# Patient Record
Sex: FEMALE | Race: WHITE | Employment: OTHER | URBAN - METROPOLITAN AREA
[De-identification: names, ages, dates, MRNs, and addresses within clinical notes are randomized per-mention and may not be internally consistent; named-entity substitution may affect disease eponyms.]

---

## 2017-06-06 ENCOUNTER — HOSPITAL ENCOUNTER (INPATIENT)
Age: 42
LOS: 14 days | Discharge: HOME HEALTH | DRG: 908 | End: 2017-06-21
Attending: INTERNAL MEDICINE | Admitting: SURGERY
Payer: MEDICARE

## 2017-06-06 ENCOUNTER — APPOINTMENT (OUTPATIENT)
Dept: GENERAL RADIOLOGY | Age: 42
DRG: 908 | End: 2017-06-06
Attending: INTERNAL MEDICINE
Payer: MEDICARE

## 2017-06-06 ENCOUNTER — ANESTHESIA EVENT (OUTPATIENT)
Dept: ENDOSCOPY | Age: 42
DRG: 908 | End: 2017-06-06
Payer: MEDICARE

## 2017-06-06 ENCOUNTER — ANESTHESIA (OUTPATIENT)
Dept: ENDOSCOPY | Age: 42
DRG: 908 | End: 2017-06-06
Payer: MEDICARE

## 2017-06-06 ENCOUNTER — APPOINTMENT (OUTPATIENT)
Dept: GENERAL RADIOLOGY | Age: 42
DRG: 908 | End: 2017-06-06
Attending: SURGERY
Payer: MEDICARE

## 2017-06-06 PROBLEM — K63.1 PERFORATED BOWEL (HCC): Status: ACTIVE | Noted: 2017-06-06

## 2017-06-06 LAB — HCG UR QL: NEGATIVE

## 2017-06-06 PROCEDURE — 76040000009: Performed by: INTERNAL MEDICINE

## 2017-06-06 PROCEDURE — 77030035048 HC TRCR ENDOSC OPTCL COVD -B: Performed by: SURGERY

## 2017-06-06 PROCEDURE — 77030010541: Performed by: SURGERY

## 2017-06-06 PROCEDURE — 74011000258 HC RX REV CODE- 258

## 2017-06-06 PROCEDURE — 74011250636 HC RX REV CODE- 250/636

## 2017-06-06 PROCEDURE — 77030020263 HC SOL INJ SOD CL0.9% LFCR 1000ML: Performed by: SURGERY

## 2017-06-06 PROCEDURE — 77030020747 HC TU INSUF ENDOSC TELE -A: Performed by: SURGERY

## 2017-06-06 PROCEDURE — 77030013079 HC BLNKT BAIR HGGR 3M -A: Performed by: ANESTHESIOLOGY

## 2017-06-06 PROCEDURE — 77030011640 HC PAD GRND REM COVD -A: Performed by: SURGERY

## 2017-06-06 PROCEDURE — 77030008756 HC TU IRR SUC STRY -B: Performed by: SURGERY

## 2017-06-06 PROCEDURE — 77030026438 HC STYL ET INTUB CARD -A: Performed by: ANESTHESIOLOGY

## 2017-06-06 PROCEDURE — 77030034849: Performed by: SURGERY

## 2017-06-06 PROCEDURE — 77030002933 HC SUT MCRYL J&J -A: Performed by: SURGERY

## 2017-06-06 PROCEDURE — 77030008684 HC TU ET CUF COVD -B: Performed by: ANESTHESIOLOGY

## 2017-06-06 PROCEDURE — 77030010936 HC CLP LIG BSC -C: Performed by: INTERNAL MEDICINE

## 2017-06-06 PROCEDURE — 77030011264 HC ELECTRD BLD EXT COVD -A: Performed by: SURGERY

## 2017-06-06 PROCEDURE — 77030002966 HC SUT PDS J&J -A: Performed by: SURGERY

## 2017-06-06 PROCEDURE — 74011000250 HC RX REV CODE- 250: Performed by: SURGERY

## 2017-06-06 PROCEDURE — 0DJ08ZZ INSPECTION OF UPPER INTESTINAL TRACT, VIA NATURAL OR ARTIFICIAL OPENING ENDOSCOPIC: ICD-10-PCS | Performed by: INTERNAL MEDICINE

## 2017-06-06 PROCEDURE — 77030035051: Performed by: SURGERY

## 2017-06-06 PROCEDURE — 74000 XR ABD PORT  1 V: CPT

## 2017-06-06 PROCEDURE — C1751 CATH, INF, PER/CENT/MIDLINE: HCPCS

## 2017-06-06 PROCEDURE — 80047 BASIC METABLC PNL IONIZED CA: CPT

## 2017-06-06 PROCEDURE — 77030012407 HC DRN WND BARD -B: Performed by: SURGERY

## 2017-06-06 PROCEDURE — 76060000045 HC ANESTHESIA 7 TO 7.5 HR: Performed by: SURGERY

## 2017-06-06 PROCEDURE — 81025 URINE PREGNANCY TEST: CPT

## 2017-06-06 PROCEDURE — 77030022711 HC TU ENTRPRO BLN DISP OLSI -C: Performed by: INTERNAL MEDICINE

## 2017-06-06 PROCEDURE — 77030018836 HC SOL IRR NACL ICUM -A: Performed by: SURGERY

## 2017-06-06 PROCEDURE — 77030013567 HC DRN WND RESERV BARD -A: Performed by: SURGERY

## 2017-06-06 PROCEDURE — 77030031139 HC SUT VCRL2 J&J -A: Performed by: SURGERY

## 2017-06-06 PROCEDURE — 74011000250 HC RX REV CODE- 250

## 2017-06-06 PROCEDURE — 77030002996 HC SUT SLK J&J -A: Performed by: SURGERY

## 2017-06-06 PROCEDURE — 77030002916 HC SUT ETHLN J&J -A: Performed by: SURGERY

## 2017-06-06 PROCEDURE — 76210000005 HC OR PH I REC 5 TO 5.5 HR: Performed by: SURGERY

## 2017-06-06 PROCEDURE — 77030032490 HC SLV COMPR SCD KNE COVD -B: Performed by: SURGERY

## 2017-06-06 PROCEDURE — 76010000142 HC OR TIME 7 TO 7.5 HR: Performed by: SURGERY

## 2017-06-06 PROCEDURE — 74011250636 HC RX REV CODE- 250/636: Performed by: INTERNAL MEDICINE

## 2017-06-06 PROCEDURE — 76060000034 HC ANESTHESIA 1.5 TO 2 HR: Performed by: INTERNAL MEDICINE

## 2017-06-06 PROCEDURE — 77030010507 HC ADH SKN DERMBND J&J -B: Performed by: SURGERY

## 2017-06-06 RX ORDER — HYDROMORPHONE HYDROCHLORIDE 2 MG/ML
INJECTION, SOLUTION INTRAMUSCULAR; INTRAVENOUS; SUBCUTANEOUS AS NEEDED
Status: DISCONTINUED | OUTPATIENT
Start: 2017-06-06 | End: 2017-06-07 | Stop reason: HOSPADM

## 2017-06-06 RX ORDER — BUPIVACAINE HYDROCHLORIDE 2.5 MG/ML
50 INJECTION, SOLUTION EPIDURAL; INFILTRATION; INTRACAUDAL ONCE
Status: COMPLETED | OUTPATIENT
Start: 2017-06-07 | End: 2017-06-06

## 2017-06-06 RX ORDER — ONDANSETRON 2 MG/ML
INJECTION INTRAMUSCULAR; INTRAVENOUS AS NEEDED
Status: DISCONTINUED | OUTPATIENT
Start: 2017-06-06 | End: 2017-06-07 | Stop reason: HOSPADM

## 2017-06-06 RX ORDER — LIDOCAINE HYDROCHLORIDE 20 MG/ML
INJECTION, SOLUTION EPIDURAL; INFILTRATION; INTRACAUDAL; PERINEURAL AS NEEDED
Status: DISCONTINUED | OUTPATIENT
Start: 2017-06-06 | End: 2017-06-07 | Stop reason: HOSPADM

## 2017-06-06 RX ORDER — MIDAZOLAM HYDROCHLORIDE 1 MG/ML
INJECTION, SOLUTION INTRAMUSCULAR; INTRAVENOUS AS NEEDED
Status: DISCONTINUED | OUTPATIENT
Start: 2017-06-06 | End: 2017-06-07 | Stop reason: HOSPADM

## 2017-06-06 RX ORDER — ONDANSETRON 8 MG/1
8 TABLET, ORALLY DISINTEGRATING ORAL
COMMUNITY
End: 2017-08-24

## 2017-06-06 RX ORDER — SUCCINYLCHOLINE CHLORIDE 20 MG/ML
INJECTION INTRAMUSCULAR; INTRAVENOUS AS NEEDED
Status: DISCONTINUED | OUTPATIENT
Start: 2017-06-06 | End: 2017-06-07 | Stop reason: HOSPADM

## 2017-06-06 RX ORDER — DIPHENHYDRAMINE HYDROCHLORIDE 50 MG/ML
50 INJECTION, SOLUTION INTRAMUSCULAR; INTRAVENOUS ONCE
Status: ACTIVE | OUTPATIENT
Start: 2017-06-06 | End: 2017-06-07

## 2017-06-06 RX ORDER — PREDNISONE 20 MG/1
30 TABLET ORAL DAILY
COMMUNITY
End: 2017-08-24

## 2017-06-06 RX ORDER — ROCURONIUM BROMIDE 10 MG/ML
INJECTION, SOLUTION INTRAVENOUS AS NEEDED
Status: DISCONTINUED | OUTPATIENT
Start: 2017-06-06 | End: 2017-06-07 | Stop reason: HOSPADM

## 2017-06-06 RX ORDER — DEXTROMETHORPHAN/PSEUDOEPHED 2.5-7.5/.8
1.2 DROPS ORAL
Status: DISCONTINUED | OUTPATIENT
Start: 2017-06-06 | End: 2017-06-07

## 2017-06-06 RX ORDER — SODIUM CHLORIDE 0.9 % (FLUSH) 0.9 %
5-10 SYRINGE (ML) INJECTION EVERY 8 HOURS
Status: ACTIVE | OUTPATIENT
Start: 2017-06-06 | End: 2017-06-06

## 2017-06-06 RX ORDER — DEXAMETHASONE SODIUM PHOSPHATE 4 MG/ML
INJECTION, SOLUTION INTRA-ARTICULAR; INTRALESIONAL; INTRAMUSCULAR; INTRAVENOUS; SOFT TISSUE AS NEEDED
Status: DISCONTINUED | OUTPATIENT
Start: 2017-06-06 | End: 2017-06-07 | Stop reason: HOSPADM

## 2017-06-06 RX ORDER — LABETALOL HYDROCHLORIDE 5 MG/ML
INJECTION, SOLUTION INTRAVENOUS AS NEEDED
Status: DISCONTINUED | OUTPATIENT
Start: 2017-06-06 | End: 2017-06-07 | Stop reason: HOSPADM

## 2017-06-06 RX ORDER — FENTANYL CITRATE 50 UG/ML
100 INJECTION, SOLUTION INTRAMUSCULAR; INTRAVENOUS
Status: ACTIVE | OUTPATIENT
Start: 2017-06-06 | End: 2017-06-06

## 2017-06-06 RX ORDER — MIDAZOLAM HYDROCHLORIDE 1 MG/ML
.25-1 INJECTION, SOLUTION INTRAMUSCULAR; INTRAVENOUS
Status: DISPENSED | OUTPATIENT
Start: 2017-06-06 | End: 2017-06-06

## 2017-06-06 RX ORDER — PROPOFOL 10 MG/ML
INJECTION, EMULSION INTRAVENOUS AS NEEDED
Status: DISCONTINUED | OUTPATIENT
Start: 2017-06-06 | End: 2017-06-07 | Stop reason: HOSPADM

## 2017-06-06 RX ORDER — SODIUM CHLORIDE 0.9 % (FLUSH) 0.9 %
5-10 SYRINGE (ML) INJECTION AS NEEDED
Status: ACTIVE | OUTPATIENT
Start: 2017-06-06 | End: 2017-06-06

## 2017-06-06 RX ORDER — SODIUM CHLORIDE 9 MG/ML
100 INJECTION, SOLUTION INTRAVENOUS CONTINUOUS
Status: DISPENSED | OUTPATIENT
Start: 2017-06-06 | End: 2017-06-06

## 2017-06-06 RX ORDER — FLUMAZENIL 0.1 MG/ML
0.2 INJECTION INTRAVENOUS
Status: ACTIVE | OUTPATIENT
Start: 2017-06-06 | End: 2017-06-06

## 2017-06-06 RX ORDER — EPINEPHRINE 0.1 MG/ML
1 INJECTION INTRACARDIAC; INTRAVENOUS
Status: ACTIVE | OUTPATIENT
Start: 2017-06-06 | End: 2017-06-06

## 2017-06-06 RX ORDER — ATROPINE SULFATE 0.1 MG/ML
0.5 INJECTION INTRAVENOUS
Status: ACTIVE | OUTPATIENT
Start: 2017-06-06 | End: 2017-06-06

## 2017-06-06 RX ORDER — NALOXONE HYDROCHLORIDE 0.4 MG/ML
0.4 INJECTION, SOLUTION INTRAMUSCULAR; INTRAVENOUS; SUBCUTANEOUS
Status: ACTIVE | OUTPATIENT
Start: 2017-06-06 | End: 2017-06-06

## 2017-06-06 RX ORDER — SODIUM CHLORIDE, SODIUM LACTATE, POTASSIUM CHLORIDE, CALCIUM CHLORIDE 600; 310; 30; 20 MG/100ML; MG/100ML; MG/100ML; MG/100ML
INJECTION, SOLUTION INTRAVENOUS
Status: DISCONTINUED | OUTPATIENT
Start: 2017-06-06 | End: 2017-06-07 | Stop reason: HOSPADM

## 2017-06-06 RX ADMIN — DEXAMETHASONE SODIUM PHOSPHATE 8 MG: 4 INJECTION, SOLUTION INTRA-ARTICULAR; INTRALESIONAL; INTRAMUSCULAR; INTRAVENOUS; SOFT TISSUE at 16:39

## 2017-06-06 RX ADMIN — HYDROMORPHONE HYDROCHLORIDE 0.5 MG: 2 INJECTION, SOLUTION INTRAMUSCULAR; INTRAVENOUS; SUBCUTANEOUS at 20:08

## 2017-06-06 RX ADMIN — ROCURONIUM BROMIDE 30 MG: 10 INJECTION, SOLUTION INTRAVENOUS at 20:38

## 2017-06-06 RX ADMIN — PROPOFOL 100 MG: 10 INJECTION, EMULSION INTRAVENOUS at 16:45

## 2017-06-06 RX ADMIN — FENTANYL CITRATE 50 MCG: 50 INJECTION, SOLUTION INTRAMUSCULAR; INTRAVENOUS at 19:45

## 2017-06-06 RX ADMIN — LABETALOL HYDROCHLORIDE 5 MG: 5 INJECTION, SOLUTION INTRAVENOUS at 21:46

## 2017-06-06 RX ADMIN — LABETALOL HYDROCHLORIDE 10 MG: 5 INJECTION, SOLUTION INTRAVENOUS at 21:57

## 2017-06-06 RX ADMIN — SODIUM CHLORIDE, SODIUM LACTATE, POTASSIUM CHLORIDE, CALCIUM CHLORIDE: 600; 310; 30; 20 INJECTION, SOLUTION INTRAVENOUS at 21:23

## 2017-06-06 RX ADMIN — PROPOFOL 100 MG: 10 INJECTION, EMULSION INTRAVENOUS at 17:00

## 2017-06-06 RX ADMIN — MIDAZOLAM HYDROCHLORIDE 2 MG: 1 INJECTION, SOLUTION INTRAMUSCULAR; INTRAVENOUS at 18:22

## 2017-06-06 RX ADMIN — SODIUM CHLORIDE, SODIUM LACTATE, POTASSIUM CHLORIDE, CALCIUM CHLORIDE: 600; 310; 30; 20 INJECTION, SOLUTION INTRAVENOUS at 16:00

## 2017-06-06 RX ADMIN — HYDROMORPHONE HYDROCHLORIDE 0.5 MG: 2 INJECTION, SOLUTION INTRAMUSCULAR; INTRAVENOUS; SUBCUTANEOUS at 19:31

## 2017-06-06 RX ADMIN — HYDROMORPHONE HYDROCHLORIDE 0.5 MG: 2 INJECTION, SOLUTION INTRAMUSCULAR; INTRAVENOUS; SUBCUTANEOUS at 20:50

## 2017-06-06 RX ADMIN — SODIUM CHLORIDE, SODIUM LACTATE, POTASSIUM CHLORIDE, CALCIUM CHLORIDE: 600; 310; 30; 20 INJECTION, SOLUTION INTRAVENOUS at 18:15

## 2017-06-06 RX ADMIN — SUCCINYLCHOLINE CHLORIDE 180 MG: 20 INJECTION INTRAMUSCULAR; INTRAVENOUS at 16:31

## 2017-06-06 RX ADMIN — SODIUM CHLORIDE 100 ML/HR: 900 INJECTION, SOLUTION INTRAVENOUS at 16:00

## 2017-06-06 RX ADMIN — ROCURONIUM BROMIDE 20 MG: 10 INJECTION, SOLUTION INTRAVENOUS at 18:58

## 2017-06-06 RX ADMIN — ONDANSETRON 4 MG: 2 INJECTION INTRAMUSCULAR; INTRAVENOUS at 16:31

## 2017-06-06 RX ADMIN — HYDROMORPHONE HYDROCHLORIDE 0.5 MG: 2 INJECTION, SOLUTION INTRAMUSCULAR; INTRAVENOUS; SUBCUTANEOUS at 20:43

## 2017-06-06 RX ADMIN — PROPOFOL 150 MG: 10 INJECTION, EMULSION INTRAVENOUS at 18:22

## 2017-06-06 RX ADMIN — LIDOCAINE HYDROCHLORIDE 80 MG: 20 INJECTION, SOLUTION EPIDURAL; INFILTRATION; INTRACAUDAL; PERINEURAL at 16:31

## 2017-06-06 RX ADMIN — ROCURONIUM BROMIDE 10 MG: 10 INJECTION, SOLUTION INTRAVENOUS at 16:31

## 2017-06-06 RX ADMIN — ROCURONIUM BROMIDE 40 MG: 10 INJECTION, SOLUTION INTRAVENOUS at 18:22

## 2017-06-06 RX ADMIN — FENTANYL CITRATE 50 MCG: 50 INJECTION, SOLUTION INTRAMUSCULAR; INTRAVENOUS at 21:35

## 2017-06-06 RX ADMIN — LABETALOL HYDROCHLORIDE 5 MG: 5 INJECTION, SOLUTION INTRAVENOUS at 21:20

## 2017-06-06 RX ADMIN — PROPOFOL 100 MG: 10 INJECTION, EMULSION INTRAVENOUS at 16:42

## 2017-06-06 RX ADMIN — ROCURONIUM BROMIDE 10 MG: 10 INJECTION, SOLUTION INTRAVENOUS at 19:31

## 2017-06-06 RX ADMIN — SODIUM CHLORIDE, SODIUM LACTATE, POTASSIUM CHLORIDE, CALCIUM CHLORIDE: 600; 310; 30; 20 INJECTION, SOLUTION INTRAVENOUS at 20:38

## 2017-06-06 RX ADMIN — LABETALOL HYDROCHLORIDE 5 MG: 5 INJECTION, SOLUTION INTRAVENOUS at 21:15

## 2017-06-06 RX ADMIN — ROCURONIUM BROMIDE 20 MG: 10 INJECTION, SOLUTION INTRAVENOUS at 20:08

## 2017-06-06 RX ADMIN — FENTANYL CITRATE 50 MCG: 50 INJECTION, SOLUTION INTRAMUSCULAR; INTRAVENOUS at 19:26

## 2017-06-06 RX ADMIN — PROPOFOL 200 MG: 10 INJECTION, EMULSION INTRAVENOUS at 16:31

## 2017-06-06 RX ADMIN — ROCURONIUM BROMIDE 20 MG: 10 INJECTION, SOLUTION INTRAVENOUS at 21:20

## 2017-06-06 RX ADMIN — ROCURONIUM BROMIDE 20 MG: 10 INJECTION, SOLUTION INTRAVENOUS at 19:03

## 2017-06-06 RX ADMIN — FENTANYL CITRATE 50 MCG: 50 INJECTION, SOLUTION INTRAMUSCULAR; INTRAVENOUS at 18:38

## 2017-06-06 RX ADMIN — PROPOFOL 100 MG: 10 INJECTION, EMULSION INTRAVENOUS at 17:34

## 2017-06-06 RX ADMIN — FENTANYL CITRATE 50 MCG: 50 INJECTION, SOLUTION INTRAMUSCULAR; INTRAVENOUS at 18:40

## 2017-06-06 NOTE — H&P
[address], [address] [place] [address]      History and Physical       NAME:  Pedro Pablo Macedo   :   1975   MRN:   [id number]             History of Present Illness:  Patient is a 46 y.o. who is seen for antegrade single balloon enteroscopy for retained video capsule endoscopy. PMH:  Past Medical History:   Diagnosis Date    Crohn's disease ([facility] 8/15/2011    DVT (deep venous thrombosis) ([facility] 8/15/2011    Incarcerated ventral hernia 8/15/2011    Right flank pain 2011    Seizures ([facility]     Stroke [facility])        PSH:  Past Surgical History:   Procedure Laterality Date    HX OTHER SURGICAL      multiple procedures related to her Crohn's disease    ID LAP, INCISIONAL HERNIA REPAIR,INCARCERATED  9-10-08    dr. Ken Vences       Allergies: Allergies   Allergen Reactions    Demerol [Meperidine] Unknown (comments)    Soma [Carisoprodol] Rash and Nausea Only    Sulfa (Sulfonamide Antibiotics) Unknown (comments)    Toradol [Ketorolac Tromethamine] Unknown (comments)       Home Medications:  Prior to Admission Medications   Prescriptions Last Dose Informant Patient Reported? Taking? ALPRAZOLAM (XANAX PO) 2017 at Unknown time  Yes Yes   Sig: Take  by mouth. INFLIXIMAB (REMICADE IV) 2017  Yes No   Sig: by IntraVENous route. Every 12 weeks    LANSOPRAZOLE (PREVACID PO)   Yes No   Sig: Take  by mouth. Oxycodone (ROXYCODONE) 20 mg tab Not Taking at Unknown time  Yes No   Sig: Take  by mouth every four (4) hours as needed. ZOLPIDEM TARTRATE (AMBIEN PO) Not Taking at Unknown time  Yes No   Sig: Take 20 mg by mouth. albuterol (PROVENTIL HFA, VENTOLIN HFA, PROAIR HFA) 90 mcg/actuation inhaler 2017 at Unknown time  Yes Yes   Sig: Take 2 Puffs by inhalation every four (4) hours as needed for Wheezing. buPROPion XL (WELLBUTRIN XL) 300 mg XL tablet Not Taking at Unknown time  Yes No   Sig: Take 300 mg by mouth every morning.    cholecalciferol, vitamin d3, (VITAMIN D) 1,000 unit tablet Not Taking at Unknown time  Yes No   Sig: Take  by mouth daily. cyanocobalamin (VITAMIN B-12) 1,000 mcg/mL injection 2017 at Unknown time  Yes Yes   Si,000 mcg by IntraMUSCular route every fourteen (14) days. fentanyl (DURAGESIC) 100 mcg/hr PATCH Not Taking at Unknown time  Yes No   Si Patch by TransDERmal route every seventy-two (72) hours. magnesium 250 mg tab Not Taking at Unknown time  Yes No   Sig: Take 500 mg by mouth daily. methocarbamol (ROBAXIN-750) 750 mg tablet Not Taking at Unknown time  Yes No   Sig: Take  by mouth four (4) times daily. methylnaltrexone (RELISTOR) 150 mg tab tablet 2017 at Unknown time  Yes Yes   Sig: Take 150 mg by mouth Daily (before breakfast). morphine CR (MS CONTIN) 60 mg CR tablet 2017 at Unknown time  Yes Yes   Sig: Take 60 mg by mouth three (3) times daily. morphine IR (MS IR) 30 mg tablet 2017 at Unknown time  Yes Yes   Sig: Take 30 mg by mouth every four (4) hours as needed for Pain. ondansetron (ZOFRAN ODT) 8 mg disintegrating tablet 2017 at Unknown time  Yes Yes   Sig: Take 8 mg by mouth every eight (8) hours as needed for Nausea. Indications: ACUTE GASTROENTERITIS-RELATED VOMITING IN PEDIATRICS   predniSONE (DELTASONE) 20 mg tablet 2017 at Unknown time  Yes Yes   Sig: Take 30 mg by mouth daily. promethazine (PHENERGAN) 25 mg suppository 2017 at Unknown time  Yes Yes   Sig: Insert 25 mg into rectum every six (6) hours as needed.       Facility-Administered Medications: None       Hospital Medications:  Current Facility-Administered Medications   Medication Dose Route Frequency    0.9% sodium chloride infusion  100 mL/hr IntraVENous CONTINUOUS    sodium chloride (NS) flush 5-10 mL  5-10 mL IntraVENous Q8H    sodium chloride (NS) flush 5-10 mL  5-10 mL IntraVENous PRN    midazolam (VERSED) injection 0.25-10 mg  0.25-10 mg IntraVENous Multiple    fentaNYL citrate (PF) injection 100 mcg 100 mcg IntraVENous Multiple    naloxone (NARCAN) injection 0.4 mg  0.4 mg IntraVENous Multiple    flumazenil (ROMAZICON) 0.1 mg/mL injection 0.2 mg  0.2 mg IntraVENous Multiple    simethicone (MYLICON) 86BV/9.0AU oral drops 80 mg  1.2 mL Oral Multiple    diphenhydrAMINE (BENADRYL) injection 50 mg  50 mg IntraVENous ONCE    atropine injection 0.5 mg  0.5 mg IntraVENous ONCE PRN    EPINEPHrine (ADRENALIN) 0.1 mg/mL syringe 1 mg  1 mg Endoscopically ONCE PRN       Social History:  Social History   Substance Use Topics    Smoking status: Former Smoker    Smokeless tobacco: Not on file    Alcohol use No       Family History:  Family History   Problem Relation Age of Onset    Hypertension Father     Stroke Father     Other Father      arthritis             Review of Systems:      Constitutional: negative fever, negative chills, negative weight loss  Eyes:   negative visual changes  ENT:   negative sore throat, tongue or lip swelling  Respiratory:  negative cough, negative dyspnea  Cards:  negative for chest pain, palpitations, lower extremity edema  GI:   See HPI  :  negative for frequency, dysuria  Integument:  negative for rash and pruritus  Heme:  negative for easy bruising and gum/nose bleeding  Musculoskel: negative for myalgias,  back pain and muscle weakness  Neuro: negative for headaches, dizziness, vertigo  Psych:  negative for feelings of anxiety, depression       Objective:   Patient Vitals for the past 8 hrs:   BP Temp Pulse Resp SpO2 Height Weight   06/06/17 1531 - - - 24 - - -   06/06/17 1522 118/68 99.2 °F (37.3 °C) (!) 117 - 96 % 5' 4\" (1.626 m) 147 kg (324 lb)             EXAM:     NEURO-a&o   HEENT-wnl   LUNGS-clear    COR-regular rate and rhythym     ABD-soft , no tenderness, no rebound, good bs     EXT-no edema     Data Review     No results for input(s): WBC, HGB, HCT, PLT, HGBEXT, HCTEXT, PLTEXT in the last 72 hours.   No results for input(s): NA, K, CL, CO2, BUN, CREA, GLU, PHOS, CA in the last 72 hours. No results for input(s): SGOT, GPT, AP, TBIL, TP, ALB, GLOB, GGT, AML, LPSE in the last 72 hours. No lab exists for component: AMYP, HLPSE  No results for input(s): INR, PTP, APTT in the last 72 hours. No lab exists for component: INREXT       Assessment:   · Retained video capsule endoscopy     Patient Active Problem List   Diagnosis Code    Crohn's disease ([facility] K50.90    DVT (deep venous thrombosis) ([facility] I82.409    Incarcerated ventral hernia K46.0    Right flank pain R10.9     Plan:   · Endoscopic procedure with GA     Signed By: Johnson Damian.  Maco Recio MD     6/6/2017  4:08 PM

## 2017-06-06 NOTE — ANESTHESIA PREPROCEDURE EVALUATION
Anesthetic History   No history of anesthetic complications            Review of Systems / Medical History  Patient summary reviewed, nursing notes reviewed and pertinent labs reviewed    Pulmonary  Within defined limits                 Neuro/Psych   Within defined limits           Cardiovascular  Within defined limits                     GI/Hepatic/Renal  Within defined limits              Endo/Other  Within defined limits           Other Findings              Physical Exam    Airway  Mallampati: II  TM Distance: > 6 cm  Neck ROM: normal range of motion   Mouth opening: Normal     Cardiovascular  Regular rate and rhythm,  S1 and S2 normal,  no murmur, click, rub, or gallop             Dental  No notable dental hx       Pulmonary  Breath sounds clear to auscultation               Abdominal  GI exam deferred       Other Findings            Anesthetic Plan    ASA: 3  Anesthesia type: general          Induction: Intravenous  Anesthetic plan and risks discussed with: Patient

## 2017-06-06 NOTE — IP AVS SNAPSHOT
Summary of Care Report The Summary of Care report has been created to help improve care coordination. Users with access to [employer] or [address] (Web-based application) may access additional patient information including the Discharge Summary. If you are not currently a [address] user and need more information, please call the number listed below in the [place] [address] section and ask to be connected with Medical Records. Facility Information Name Address Phone [address] [id number] [phone] Patient Information Patient Name Sex ALEJO Sherwood ([id number]) Female 1975 Discharge Information Admitting Provider Service Area Unit [facility] / [phone] Discharge Provider Discharge Date/Time Discharge Disposition Destination (none) 2017 10:00 (Pending) AHR (none) Patient Language Language ENGLISH [13] Hospital Problems as of 2017  Reviewed: 2017  9:50 PM by MACHO Andrade Noted - Resolved Last Modified POA Active Problems Perforated bowel ([facility] [address]  2017 - Present 2017 by Luana Rees MD Unknown Entered by Luana Rees MD  
  
Non-Hospital Problems as of 2017  Reviewed: 2017  9:50 PM by MACHO Andrade Noted - Resolved Last Modified Active Problems Crohn's disease ([address]  8/15/2011 - Present 8/15/2011 by Leatha Jeffrey LPN Entered by Leatha Jeffrey LPN  
  DVT (deep venous thrombosis) ([facility] [address]  8/15/2011 - Present 8/15/2011 by Leatha Jeffrey LPN Entered by Leatha Jeffrey LPN Overview Signed 8/15/2011  2:08 PM by Leatha Jeffrey LPN  
   67   Incarcerated ventral hernia  8/15/2011 - Present 8/15/2011 by Leatha Jeffrey LPN  
 Entered by Sandrita Lutz LPN Overview Signed 8/15/2011  2:30 PM by Sandrita Lutz LPN History of 
  
  
  Right flank pain  8/22/2011 - Present 8/22/2011 by Sonido Lo MD  
  Entered by Sonido Lo MD  
  
You are allergic to the following Allergen Reactions Demerol (Meperidine) Unknown (comments) Soma (Carisoprodol) Rash Nausea Only Sulfa (Sulfonamide Antibiotics) Unknown (comments) Toradol (Ketorolac Tromethamine) Unknown (comments) Current Discharge Medication List  
  
START taking these medications Dose & Instructions Dispensing Information Comments  
 clindamycin 300 mg capsule Commonly known as:  CLEOCIN Dose:  300 mg Take 1 Cap by mouth three (3) times daily for 7 days. Quantity:  21 Cap Refills:  0 HYDROmorphone 2 mg tablet Commonly known as:  DILAUDID Dose:  2 mg Take 1 Tab by mouth every four (4) hours as needed. Max Daily Amount: 12 mg. Quantity:  40 Tab Refills:  0 CONTINUE these medications which have NOT CHANGED Dose & Instructions Dispensing Information Comments * albuterol 90 mcg/actuation inhaler Commonly known as:  PROVENTIL HFA, VENTOLIN HFA, PROAIR HFA Dose:  2 Puff Take 2 Puffs by inhalation every four (4) hours as needed for Wheezing. Refills:  0  
   
 * albuterol sulfate SR 4 mg ER tablet Commonly known as:  PROVENTIL SR Dose:  4 mg Take 4 mg by mouth daily as needed for Other (Wheezing). Tries to alternate with albuterol MDI when she doesn't need rapid resolution of symptoms Refills:  0  
   
 BREO ELLIPTA 200-25 mcg/dose inhaler Generic drug:  fluticasone-vilanterol Dose:  1 Puff Take 1 Puff by inhalation daily. Refills:  0  
   
 CRYSELLE (28) 0.3-30 mg-mcg Tab Generic drug:  norgestrel-ethinyl estradiol Dose:  1 Tab Take 1 Tab by mouth daily. Refills:  0  
   
 cyanocobalamin (vitamin B-12) 5,000 mcg Tbdi  
 Dose:  5000 mcg Take 5,000 mcg by mouth Every Friday. Refills:  0  
   
 esomeprazole 20 mg capsule Commonly known as:  Larey Meo Dose:  20 mg Take 20 mg by mouth daily. Refills:  0  
   
 * MS CONTIN 60 mg CR tablet Generic drug:  morphine CR Dose:  60 mg Take 60 mg by mouth three (3) times daily. Refills:  0  
   
 * morphine IR 30 mg tablet Commonly known as:  MS IR Dose:  30 mg Take 30 mg by mouth every four (4) hours as needed for Pain. Refills:  0  
   
 predniSONE 20 mg tablet Commonly known as:  Edgar Potash Dose:  30 mg Take 30 mg by mouth daily. Takes 1.5 of the 20 mg tab Refills:  0  
   
 promethazine 25 mg tablet Commonly known as:  PHENERGAN Dose:  25 mg Take 25 mg by mouth every six (6) hours as needed for Nausea. Tries to alternate with ondansetron Refills:  0  
   
 RELISTOR 150 mg Tab tablet Generic drug:  methylnaltrexone Dose:  150 mg Take 150 mg by mouth Daily (before breakfast). Refills:  0 REMICADE IV  
 by IntraVENous route every six (6) weeks. Refills:  0  
   
 XANAX PO Dose:  1 mg Take 1 mg by mouth three (3) times daily as needed. Refills:  0 ZOFRAN ODT 8 mg disintegrating tablet Generic drug:  ondansetron Dose:  8 mg Take 8 mg by mouth every eight (8) hours as needed for Nausea. Tries to alternate with promethazine Refills:  0  
   
 * Notice: This list has 4 medication(s) that are the same as other medications prescribed for you. Read the directions carefully, and ask your doctor or other care provider to review them with you. Surgery Information ID Date/Time Status Primary Surgeon All Procedures Location [id number] 6/6/2017 3 [facility] James Tran MD SMALL BOWEL ENTEROSCOPY (FLUORO) RESOLUTION CLIP [facility] ENDOSCOPY    
 [id number] 6/6/2017 1900 Posted David Barrett MD Diagnostic Laparoscopy, converted to open Exploratory Laparotomy, Lysis of adhesions for 7 hours/ Intraoperative Upper Endoscopy by Dr. Carolyne Gillis. [facility] MAIN OR Follow-up Information Follow up With Details Comments Contact Info Caro [address] 227 Call in 1 day home health wound care. Please call if you have not heard from [facility] by 11AM the day after discharge. [phone] [phone] 
[phone] Heather Bull MD   [facility] [address] [facility] [address] 
[phone] Discharge Instructions 1. Do not drive while on pain medication. You should take a stool softener while on pain medication to prevent constipation. 2.  Do not lift anything greater than 15 pounds for 3 weeks. 3.  You may resume your home medications. 4.  You may shower but do not swim or soak in tub until your abdominal wound has healed. 5.  You will need to take Clindamycin antibiotic for 7 days. Make sure you eat a cup of yogurt or take a probiotic while on antibiotic to prevent diarrhea. 6.  You will need to call [phone] to schedule a follow-up with Dr. Wiggins Session within 2 weeks. Infection After Surgery: Care Instructions Your Care Instructions After surgery, an infection is always possible. It doesn't mean that the surgery didn't go well. Because an infection can be serious, your doctor has taken steps to manage it. Your doctor checked the infection and cleaned it if necessary. He or she may have made an opening in the area so that the pus can drain out. You may have gauze in the cut so that the area will stay open and keep draining. You may need antibiotics. You will need to follow up with your doctor to make sure the infection has gone away. Follow-up care is a key part of your treatment and safety. Be sure to make and go to all appointments, and call your doctor if you are having problems. It's also a good idea to know your test results and keep a list of the medicines you take. How can you care for yourself at home? · Make sure your surgeon knows that you saw a doctor about the infection. · If your doctor prescribed antibiotics, take them as directed. Do not stop taking them just because you feel better. You need to take the full course of antibiotics. · Ask your doctor if you can take an over-the-counter pain medicine, such as acetaminophen (Tylenol), ibuprofen (Advil, Motrin), or naproxen (Aleve). Be safe with medicines. Read and follow all instructions on the label. · Do not take two or more pain medicines at the same time unless the doctor told you to. Many pain medicines have acetaminophen, which is Tylenol. Too much acetaminophen (Tylenol) can be harmful. · Prop up the area on a pillow anytime you sit or lie down during the next 3 days. Try to keep it above the level of your heart. This will help reduce swelling. · Keep the skin clean and dry. · If you have a bandage, keep it clean and dry. · You may have a dressing over the cut (incision). A dressing helps the incision heal and protects it. Your doctor will tell you how to take care of this. You can expect drainage from the wound. · If your doctor told you how to care for your incision, follow your doctor's instructions. If you did not get instructions, follow this general advice: ¨ Wash around the incision with clean water 2 times a day. Don't use hydrogen peroxide or alcohol, which can slow healing. When should you call for help? Call your doctor now or seek immediate medical care if: 
· You have signs that your infection is getting worse, such as: 
¨ Increased pain, swelling, warmth, or redness in the area. ¨ Red streaks leading from the area. ¨ Pus draining from the wound. ¨ A new or higher fever. Watch closely for changes in your health, and be sure to contact your doctor if you have any problems. Where can you learn more? Go to http://cornell-terrell.info/.  
Enter R990 in the search box to learn more about \"Infection After Surgery: Care Instructions. \" Current as of: March 20, 2017 Content Version: 11.3 © 8744-1965 [employer]. Care instructions adapted under license by [employer] (which disclaims liability or warranty for this information). If you have questions about a medical condition or this instruction, always ask your healthcare professional. [place] [address] any warranty or liability for your use of this information. Chart Review Routing History No Routing History on File

## 2017-06-06 NOTE — PERIOP NOTES
TRANSFER - OUT REPORT:    Verbal report given to Bayron Feldman RN (name) on Pedro Pablo Macedo  being transferred to Operating room (unit) for ordered procedure       Report consisted of patients Situation, Background, Assessment and   Recommendations(SBAR). Information from the following report(s) SBAR, Kardex, Procedure Summary, Intake/Output, MAR and Recent Results was reviewed with the receiving nurse. Lines:   Peripheral IV 06/06/17 Right Hand (Active)   Site Assessment Clean, dry, & intact 6/6/2017  3:54 PM   Phlebitis Assessment 0 6/6/2017  3:54 PM   Infiltration Assessment 0 6/6/2017  3:54 PM   Dressing Status New 6/6/2017  3:54 PM   Hub Color/Line Status Yellow; Infusing 6/6/2017  3:54 PM        Opportunity for questions and clarification was provided.       Patient transported with:  Lluvia Mosquera and intubated

## 2017-06-06 NOTE — ANESTHESIA PROCEDURE NOTES
Central Line Placement    Start time: 6/6/2017 6:50 PM  End time: 6/6/2017 7:06 PM  Performed by: Young Miguel  Authorized by: Velma RONQUILLO     Indications: vascular access  Preanesthetic Checklist: patient identified, risks and benefits discussed, site marked, patient being monitored and timeout performed    Timeout Time: 18:50       Pre-procedure: All elements of maximal sterile barrier technique followed?  Yes    Maximal barrier precautions followed, 2% Chlorhexidine for cutaneous antisepsis and Hand hygiene performed prior to catheter insertion            Procedure:   Prep:  Chlorhexidine  Location:  Internal jugular  Orientation:  Right  Patient position:  Trendelenburg  Catheter type:  Quad lumen  Catheter size:  8.5 Fr  Catheter length:  16 cm  Number of attempts:  1  Successful placement: Yes      Assessment:   Post-procedure:  Catheter secured and sterile dressing applied  Assessment:  Blood return through all ports  Insertion:  Uncomplicated  Patient tolerance:  Patient tolerated the procedure well with no immediate complications

## 2017-06-06 NOTE — H&P
Chief Complaint:  Bowel perforation    HPI:  47 yo woman with hx Crohn's disease s/p colectomy and bowel resection with adhesiolysis who underwent single balloon enteroscopy today to remove camera capsule and had perforation. Pt is intubated and otherwise stable. Family reported colectomy was over 20 years ago and pt had lysis of adhesions and bowel resection by Dr. Darleen Suresh in 2017. Past Medical History:   Diagnosis Date    Cro[facility]'s disease ([facility] 8/15/2011    DVT (deep venous thrombosis) ([facility] 8/15/2011    Incarcerated ventral hernia 8/15/2011    Right flank pain 8/22/2011    Seizures ([facility]     Stroke [facility])        Past Surgical History:   Procedure Laterality Date    HX OTHER SURGICAL      multiple procedures related to her Crohn's disease    NE LAP, INCISIONAL HERNIA REPAIR,INCARCERATED  9-10-08    dr. Christian Díaz       No current facility-administered medications on file prior to encounter. Current Outpatient Prescriptions on File Prior to Encounter   Medication Sig Dispense Refill    morphine CR (MS CONTIN) 60 mg CR tablet Take 60 mg by mouth three (3) times daily.  morphine IR (MS IR) 30 mg tablet Take 30 mg by mouth every four (4) hours as needed for Pain.  albuterol (PROVENTIL HFA, VENTOLIN HFA, PROAIR HFA) 90 mcg/actuation inhaler Take 2 Puffs by inhalation every four (4) hours as needed for Wheezing.  ALPRAZOLAM (XANAX PO) Take  by mouth.  cyanocobalamin (VITAMIN B-12) 1,000 mcg/mL injection 1,000 mcg by IntraMUSCular route every fourteen (14) days.  promethazine (PHENERGAN) 25 mg suppository Insert 25 mg into rectum every six (6) hours as needed.  magnesium 250 mg tab Take 500 mg by mouth daily.  LANSOPRAZOLE (PREVACID PO) Take  by mouth.  buPROPion XL (WELLBUTRIN XL) 300 mg XL tablet Take 300 mg by mouth every morning.  methocarbamol (ROBAXIN-750) 750 mg tablet Take  by mouth four (4) times daily.       ZOLPIDEM TARTRATE (AMBIEN PO) Take 20 mg by mouth.      fentanyl (DURAGESIC) 100 mcg/hr PATCH 1 Patch by TransDERmal route every seventy-two (72) hours.  Oxycodone (ROXYCODONE) 20 mg tab Take  by mouth every four (4) hours as needed.  INFLIXIMAB (REMICADE IV) by IntraVENous route. Every 12 weeks       cholecalciferol, vitamin d3, (VITAMIN D) 1,000 unit tablet Take  by mouth daily. Allergies   Allergen Reactions    Demerol [Meperidine] Unknown (comments)    Soma [Carisoprodol] Rash and Nausea Only    Sulfa (Sulfonamide Antibiotics) Unknown (comments)    Toradol [Ketorolac Tromethamine] Unknown (comments)       Review of Systems - General ROS: negative  Psychological ROS: negative  Respiratory ROS: negative  Cardiovascular ROS: negative  Gastrointestinal ROS: Crohn's    Visit Vitals    /68    Pulse (!) 117    Temp 99.2 °F (37.3 °C)    Resp 24    Ht 5' 4\" (1.626 m)    Wt 324 lb (147 kg)    SpO2 96%    BMI 55.61 kg/m2         Physical Exam:    Gen:  NAD. Intubated and sedated  Pulm:  Unlabored  Abd:  S/ND/obese/Unable to assess tenderness    Recent Results (from the past 24 hour(s))   HCG URINE, QL. - POC    Collection Time: 06/06/17  3:39 PM   Result Value Ref Range    Pregnancy test,urine (POC) NEGATIVE  NEG         AP:  47 yo woman with bowel perforation    - Bowel perforation: To OR for diagnostic laparoscopy, possible exploratory with bowel resection, possible capsule pill removal  - Risks and benefits explained. Consent obtained from patient's mother. Proceed to OR.

## 2017-06-06 NOTE — PROCEDURES
[address] [place] [address], [address] [place] [address]       SINGLE BALLOON ENTEROSCOPY PROCEDURE NOTE      Kendy Whittington  1975  [id number]      Procedure: Single Balloon Enteroscopy    Indication: retained video capsule endoscopy    :  Yeni Caro. Kelele Elizabeth MD    Referring Provider: Toan Samaniego MD      Anesthesia/Sedation: General anesthesia . Procedure Details    After infomed consent was obtained for the procedure, with all risks and benefits of procedure explained the patient was taken to the endoscopy suite and placed in the left lateral decubitus position. Following sequential administration of sedation as per above, a standard EGD was performed. Findings are described below. Next, the enteroscope was inserted into the mouth and advanced under direct vision to proximal to mid-jejunum. A careful inspection was completed, findings and interventions are described below. Findings:    Esophagus:normal     Stomach: normal      Duodenum: normal    Jejunum: The scope was advanced into the proximal to mid-jejunum. There were deep excavating ulcers seen. Upon scope advancement and retraction to inspect the mucosa, there was a perforation visualized and this was concerning for a full thickness perforation endoscopically. Fluoroscopically, there was evidence of free air. Immediate attention was given to the mucosal site and a single Resolution 360 clip was applied in an effort to close the defect. This did not appear to be sufficient. Efforts were made to place an additional clip; however, due to extensive scope looping, two additional attempts at clip advancement through the scope were not successful. Next, the single balloon enteroscope was removed and a pediatric colonoscope was inserted and advanced maximally to a point that was proximal to the perforation. The procedure was aborted.         EBL: Mucosal bleeding was seen throughout the region of ulceration and where the perforation occurred, but this was minimal overall. Therapies: See above      Specimens: See above    Complications: None; patient tolerated the procedure well. Impression:   1. Small bowel perforation - likely full thickness and located in the proximal to mid-jejunum  2. Retained capsule endoscopy that was not reached endoscopically  3. Active Crohn's inflammation and ulceration    Recommendations:  1. Surgery consultation was requested urgently and plans were made to proceed with exploratory laparoscopy/laparotomy with the goal of repair and possible resection to include the retained capsule. 2. Appreciate Surgical team and Anesthesia team's care  3. Ancef 1 g administered  4. Patient was left intubated at the completion of the procedure for urgent transfer to [place] [id number].  Cam Woodson MD    6/6/2017 6:04 PM

## 2017-06-06 NOTE — ROUTINE PROCESS
TRANSFER - IN REPORT:    Verbal report received from MAURI KINSEY (name) on Kendy Whittington  being received from Endo (unit) for routine progression of care      Report consisted of patients Situation, Background, Assessment and   Recommendations(SBAR). Information from the following report(s) SBAR and Kardex was reviewed with the receiving nurse. Opportunity for questions and clarification was provided. Assessment completed upon patients arrival to unit and care assumed.

## 2017-06-06 NOTE — IP AVS SNAPSHOT
[address] 
[phone] Patient: Kendy Whittington MRN: [id number] [facility]:17/87/2869 You are allergic to the following Allergen Reactions Demerol (Meperidine) Unknown (comments) Soma (Carisoprodol) Rash Nausea Only Sulfa (Sulfonamide Antibiotics) Unknown (comments) Toradol (Ketorolac Tromethamine) Unknown (comments) Recent Documentation Height Weight BMI Smoking Status 1.626 m 156 kg 59.03 kg/m2 Former Smoker Emergency Contacts Name Discharge Info Relation Home Work Mobile R Caitlin Becerra 2  Parent [1] About your hospitalization You were admitted on:  June 6, 2017 You last received care in the:  [address] You were discharged on:  June 21, 2017 Unit phone number:  [phone] Why you were hospitalized Your primary diagnosis was:  Not on File Your diagnoses also included:  Perforated Bowel (Hcc) Providers Seen During Your Hospitalizations Provider Role Specialty Primary office phone Sena Ramirez MD Attending Provider General Surgery [phone] Cain Elizabeth MD Attending Provider Gastroenterology [phone] Dillon See MD Attending Provider General Surgery [phone] Your Primary Care Physician (PCP) Primary Care Physician Office Phone Office Fax 53 [address] Feng 41 Follow-up Information Follow up With Details Comments Contact Info Caro Givens 227 Call in 1 day home health wound care. Please call if you have not heard from [facility] by 11AM the day after discharge. [address] [phone] 
[phone] Toan Samaniego MD   \[facility]\"" [address] [facility] [address] [phone] Current Discharge Medication List  
  
START taking these medications Dose & Instructions Dispensing Information Comments Morning Noon Evening Bedtime  
 clindamycin 300 mg capsule Commonly known as:  CLEOCIN Your last dose was: Your next dose is:    
   
   
 Dose:  300 mg Take 1 Cap by mouth three (3) times daily for 7 days. Quantity:  21 Cap Refills:  0 HYDROmorphone 2 mg tablet Commonly known as:  DILAUDID Your last dose was: Your next dose is:    
   
   
 Dose:  2 mg Take 1 Tab by mouth every four (4) hours as needed. Max Daily Amount: 12 mg. Quantity:  40 Tab Refills:  0 CONTINUE these medications which have NOT CHANGED Dose & Instructions Dispensing Information Comments Morning Noon Evening Bedtime * albuterol 90 mcg/actuation inhaler Commonly known as:  PROVENTIL HFA, VENTOLIN HFA, PROAIR HFA Your last dose was: Your next dose is:    
   
   
 Dose:  2 Puff Take 2 Puffs by inhalation every four (4) hours as needed for Wheezing. Refills:  0  
     
   
   
   
  
 * albuterol sulfate SR 4 mg ER tablet Commonly known as:  PROVENTIL SR Your last dose was: Your next dose is:    
   
   
 Dose:  4 mg Take 4 mg by mouth daily as needed for Other (Wheezing). Tries to alternate with albuterol MDI when she doesn't need rapid resolution of symptoms Refills:  0  
     
   
   
   
  
 BREO ELLIPTA 200-25 mcg/dose inhaler Generic drug:  fluticasone-vilanterol Your last dose was: Your next dose is:    
   
   
 Dose:  1 Puff Take 1 Puff by inhalation daily. Refills:  0  
     
   
   
   
  
 CRYSELLE (28) 0.3-30 mg-mcg Tab Generic drug:  norgestrel-ethinyl estradiol Your last dose was: Your next dose is:    
   
   
 Dose:  1 Tab Take 1 Tab by mouth daily. Refills:  0  
     
   
   
   
  
 cyanocobalamin (vitamin B-12) 5,000 mcg Tbdi Your last dose was: Your next dose is: Dose:  5000 mcg Take 5,000 mcg by mouth Every Friday. Refills:  0  
     
   
   
   
  
 esomeprazole 20 mg capsule Commonly known as:  Dorina Livers Your last dose was: Your next dose is:    
   
   
 Dose:  20 mg Take 20 mg by mouth daily. Refills:  0  
     
   
   
   
  
 * MS CONTIN 60 mg CR tablet Generic drug:  morphine CR Your last dose was: Your next dose is:    
   
   
 Dose:  60 mg Take 60 mg by mouth three (3) times daily. Refills:  0  
     
   
   
   
  
 * morphine IR 30 mg tablet Commonly known as:  MS IR Your last dose was: Your next dose is:    
   
   
 Dose:  30 mg Take 30 mg by mouth every four (4) hours as needed for Pain. Refills:  0  
     
   
   
   
  
 predniSONE 20 mg tablet Commonly known as:  Sil Arguelles Your last dose was: Your next dose is:    
   
   
 Dose:  30 mg Take 30 mg by mouth daily. Takes 1.5 of the 20 mg tab Refills:  0  
     
   
   
   
  
 promethazine 25 mg tablet Commonly known as:  PHENERGAN Your last dose was: Your next dose is:    
   
   
 Dose:  25 mg Take 25 mg by mouth every six (6) hours as needed for Nausea. Tries to alternate with ondansetron Refills:  0  
     
   
   
   
  
 RELISTOR 150 mg Tab tablet Generic drug:  methylnaltrexone Your last dose was: Your next dose is:    
   
   
 Dose:  150 mg Take 150 mg by mouth Daily (before breakfast). Refills:  0 REMICADE IV Your last dose was: Your next dose is:    
   
   
 by IntraVENous route every six (6) weeks. Refills:  0  
     
   
   
   
  
 XANAX PO Your last dose was: Your next dose is:    
   
   
 Dose:  1 mg Take 1 mg by mouth three (3) times daily as needed. Refills:  0 ZOFRAN ODT 8 mg disintegrating tablet Generic drug:  ondansetron Your last dose was: Your next dose is:    
   
   
 Dose:  8 mg Take 8 mg by mouth every eight (8) hours as needed for Nausea. Tries to alternate with promethazine Refills:  0  
     
   
   
   
  
 * Notice: This list has 4 medication(s) that are the same as other medications prescribed for you. Read the directions carefully, and ask your doctor or other care provider to review them with you. Where to Get Your Medications Information on where to get these meds will be given to you by the nurse or doctor. ! Ask your nurse or doctor about these medications  
  clindamycin 300 mg capsule HYDROmorphone 2 mg tablet Discharge Instructions 1. Do not drive while on pain medication. You should take a stool softener while on pain medication to prevent constipation. 2.  Do not lift anything greater than 15 pounds for 3 weeks. 3.  You may resume your home medications. 4.  You may shower but do not swim or soak in tub until your abdominal wound has healed. 5.  You will need to take Clindamycin antibiotic for 7 days. Make sure you eat a cup of yogurt or take a probiotic while on antibiotic to prevent diarrhea. 6.  You will need to call [phone] to schedule a follow-up with Dr. Lyndon Painter within 2 weeks. Infection After Surgery: Care Instructions Your Care Instructions After surgery, an infection is always possible. It doesn't mean that the surgery didn't go well. Because an infection can be serious, your doctor has taken steps to manage it. Your doctor checked the infection and cleaned it if necessary. He or she may have made an opening in the area so that the pus can drain out. You may have gauze in the cut so that the area will stay open and keep draining. You may need antibiotics. You will need to follow up with your doctor to make sure the infection has gone away. Follow-up care is a key part of your treatment and safety.  Be sure to make and go to all appointments, and call your doctor if you are having problems. It's also a good idea to know your test results and keep a list of the medicines you take. How can you care for yourself at home? · Make sure your surgeon knows that you saw a doctor about the infection. · If your doctor prescribed antibiotics, take them as directed. Do not stop taking them just because you feel better. You need to take the full course of antibiotics. · Ask your doctor if you can take an over-the-counter pain medicine, such as acetaminophen (Tylenol), ibuprofen (Advil, Motrin), or naproxen (Aleve). Be safe with medicines. Read and follow all instructions on the label. · Do not take two or more pain medicines at the same time unless the doctor told you to. Many pain medicines have acetaminophen, which is Tylenol. Too much acetaminophen (Tylenol) can be harmful. · Prop up the area on a pillow anytime you sit or lie down during the next 3 days. Try to keep it above the level of your heart. This will help reduce swelling. · Keep the skin clean and dry. · If you have a bandage, keep it clean and dry. · You may have a dressing over the cut (incision). A dressing helps the incision heal and protects it. Your doctor will tell you how to take care of this. You can expect drainage from the wound. · If your doctor told you how to care for your incision, follow your doctor's instructions. If you did not get instructions, follow this general advice: ¨ Wash around the incision with clean water 2 times a day. Don't use hydrogen peroxide or alcohol, which can slow healing. When should you call for help? Call your doctor now or seek immediate medical care if: 
· You have signs that your infection is getting worse, such as: 
¨ Increased pain, swelling, warmth, or redness in the area. ¨ Red streaks leading from the area. ¨ Pus draining from the wound. ¨ A new or higher fever. Watch closely for changes in your health, and be sure to contact your doctor if you have any problems. Where can you learn more? Go to http://cornell-terrell.info/. Enter C340 in the search box to learn more about \"Infection After Surgery: Care Instructions. \" Current as of: March 20, 2017 Content Version: 11.3 © 8520-9802 [employer]. Care instructions adapted under license by [employer] (which disclaims liability or warranty for this information). If you have questions about a medical condition or this instruction, always ask your healthcare professional. [address] any warranty or liability for your use of this information. Discharge Orders None Introducing [facility]! [employer] introduces VEASYT patient portal. Now you can access parts of your medical record, email your doctor's office, and request medication refills online. 1. In your internet browser, go to [email or web address] 2. Click on the First Time User? Click Here link in the Sign In box. You will see the New Member Sign Up page. 3. Enter your VEASYT Access Code exactly as it appears below. You will not need to use this code after youve completed the sign-up process. If you do not sign up before the expiration date, you must request a new code. · VEASYT Access Code: [email or web address] Expires: 9/19/2017  8:55 AM 
 
4. Enter the last four digits of your Social Security Number (xxxx) and Date of Birth (mm/dd/yyyy) as indicated and click Submit. You will be taken to the next sign-up page. 5. Create a VEASYT ID. This will be your VEASYT login ID and cannot be changed, so think of one that is secure and easy to remember. 6. Create a VEASYT password. You can change your password at any time. 7. Enter your Password Reset Question and Answer. This can be used at a later time if you forget your password. 8. Enter your e-mail address. You will receive e-mail notification when new information is available in [address] 9. Click Sign Up. You can now view and download portions of your medical record. 10. Click the Download Summary menu link to download a portable copy of your medical information. If you have questions, please visit the Frequently Asked Questions section of the [employer] website. Remember, [employer] is NOT to be used for urgent needs. For medical emergencies, dial 911. Now available from your iPhone and Android! General Information Please provide this summary of care documentation to your next provider. Patient Signature:  ____________________________________________________________ Date:  ____________________________________________________________  
  
Rogelio Chelsea Provider Signature:  ____________________________________________________________ Date:  ____________________________________________________________

## 2017-06-06 NOTE — PERIOP NOTES
Patient transported to Operation Room with Azucena Fang CRNA, ACLS monitor, oxygen, intubated, with ambu bag. Patient's family notified.

## 2017-06-07 ENCOUNTER — APPOINTMENT (OUTPATIENT)
Dept: GENERAL RADIOLOGY | Age: 42
DRG: 908 | End: 2017-06-07
Attending: PHYSICIAN ASSISTANT
Payer: MEDICARE

## 2017-06-07 ENCOUNTER — APPOINTMENT (OUTPATIENT)
Dept: GENERAL RADIOLOGY | Age: 42
DRG: 908 | End: 2017-06-07
Attending: SURGERY
Payer: MEDICARE

## 2017-06-07 LAB
ABO + RH BLD: NORMAL
ALBUMIN SERPL BCP-MCNC: 2.7 G/DL (ref 3.5–5)
ALBUMIN/GLOB SERPL: 0.8 {RATIO} (ref 1.1–2.2)
ALP SERPL-CCNC: 56 U/L (ref 45–117)
ALT SERPL-CCNC: 33 U/L (ref 12–78)
ANION GAP BLD CALC-SCNC: 12 MMOL/L (ref 5–15)
ANION GAP BLD CALC-SCNC: 12 MMOL/L (ref 5–15)
AST SERPL W P-5'-P-CCNC: 27 U/L (ref 15–37)
BILIRUB SERPL-MCNC: 1.5 MG/DL (ref 0.2–1)
BLOOD GROUP ANTIBODIES SERPL: NORMAL
BUN BLD-MCNC: 18 MG/DL (ref 9–20)
BUN SERPL-MCNC: 17 MG/DL (ref 6–20)
BUN/CREAT SERPL: 18 (ref 12–20)
CA-I BLD-MCNC: 1.08 MMOL/L (ref 1.12–1.32)
CALCIUM SERPL-MCNC: 8.5 MG/DL (ref 8.5–10.1)
CHLORIDE BLD-SCNC: 102 MMOL/L (ref 98–107)
CHLORIDE SERPL-SCNC: 104 MMOL/L (ref 97–108)
CO2 BLD-SCNC: 29 MMOL/L (ref 21–32)
CO2 SERPL-SCNC: 24 MMOL/L (ref 21–32)
CREAT BLD-MCNC: 0.8 MG/DL (ref 0.6–1.3)
CREAT SERPL-MCNC: 0.96 MG/DL (ref 0.55–1.02)
ERYTHROCYTE [DISTWIDTH] IN BLOOD BY AUTOMATED COUNT: 14.7 % (ref 11.5–14.5)
GLOBULIN SER CALC-MCNC: 3.2 G/DL (ref 2–4)
GLUCOSE BLD-MCNC: 171 MG/DL (ref 65–100)
GLUCOSE SERPL-MCNC: 151 MG/DL (ref 65–100)
HCT VFR BLD AUTO: 44.7 % (ref 35–47)
HCT VFR BLD CALC: 43 % (ref 35–47)
HGB BLD-MCNC: 14.5 G/DL (ref 11.5–16)
HGB BLD-MCNC: 14.6 GM/DL (ref 11.5–16)
MCH RBC QN AUTO: 31.5 PG (ref 26–34)
MCHC RBC AUTO-ENTMCNC: 32.4 G/DL (ref 30–36.5)
MCV RBC AUTO: 97 FL (ref 80–99)
PLATELET # BLD AUTO: 164 K/UL (ref 150–400)
POTASSIUM BLD-SCNC: 4.9 MMOL/L (ref 3.5–5.1)
POTASSIUM SERPL-SCNC: 4 MMOL/L (ref 3.5–5.1)
PROT SERPL-MCNC: 5.9 G/DL (ref 6.4–8.2)
RBC # BLD AUTO: 4.61 M/UL (ref 3.8–5.2)
SERVICE CMNT-IMP: ABNORMAL
SODIUM BLD-SCNC: 137 MMOL/L (ref 136–145)
SODIUM SERPL-SCNC: 140 MMOL/L (ref 136–145)
SPECIMEN EXP DATE BLD: NORMAL
WBC # BLD AUTO: 23.6 K/UL (ref 3.6–11)

## 2017-06-07 PROCEDURE — 0DQA8ZZ REPAIR JEJUNUM, VIA NATURAL OR ARTIFICIAL OPENING ENDOSCOPIC: ICD-10-PCS | Performed by: SURGERY

## 2017-06-07 PROCEDURE — 74011250636 HC RX REV CODE- 250/636: Performed by: SURGERY

## 2017-06-07 PROCEDURE — 74011000250 HC RX REV CODE- 250: Performed by: PHYSICIAN ASSISTANT

## 2017-06-07 PROCEDURE — 02HV33Z INSERTION OF INFUSION DEVICE INTO SUPERIOR VENA CAVA, PERCUTANEOUS APPROACH: ICD-10-PCS | Performed by: SURGERY

## 2017-06-07 PROCEDURE — 87186 SC STD MICRODIL/AGAR DIL: CPT | Performed by: PHYSICIAN ASSISTANT

## 2017-06-07 PROCEDURE — 74011000258 HC RX REV CODE- 258: Performed by: SURGERY

## 2017-06-07 PROCEDURE — 65660000000 HC RM CCU STEPDOWN

## 2017-06-07 PROCEDURE — 0DN80ZZ RELEASE SMALL INTESTINE, OPEN APPROACH: ICD-10-PCS | Performed by: SURGERY

## 2017-06-07 PROCEDURE — 87070 CULTURE OTHR SPECIMN AEROBIC: CPT | Performed by: PHYSICIAN ASSISTANT

## 2017-06-07 PROCEDURE — 74011250636 HC RX REV CODE- 250/636

## 2017-06-07 PROCEDURE — 71010 XR CHEST PORT: CPT

## 2017-06-07 PROCEDURE — 74011000250 HC RX REV CODE- 250: Performed by: INTERNAL MEDICINE

## 2017-06-07 PROCEDURE — 94640 AIRWAY INHALATION TREATMENT: CPT

## 2017-06-07 PROCEDURE — 85027 COMPLETE CBC AUTOMATED: CPT | Performed by: SURGERY

## 2017-06-07 PROCEDURE — 74011000250 HC RX REV CODE- 250: Performed by: SURGERY

## 2017-06-07 PROCEDURE — 74011250636 HC RX REV CODE- 250/636: Performed by: ANESTHESIOLOGY

## 2017-06-07 PROCEDURE — C9113 INJ PANTOPRAZOLE SODIUM, VIA: HCPCS | Performed by: SURGERY

## 2017-06-07 PROCEDURE — 87147 CULTURE TYPE IMMUNOLOGIC: CPT | Performed by: PHYSICIAN ASSISTANT

## 2017-06-07 PROCEDURE — 36415 COLL VENOUS BLD VENIPUNCTURE: CPT | Performed by: SURGERY

## 2017-06-07 PROCEDURE — 86900 BLOOD TYPING SEROLOGIC ABO: CPT | Performed by: SURGERY

## 2017-06-07 PROCEDURE — 87077 CULTURE AEROBIC IDENTIFY: CPT | Performed by: PHYSICIAN ASSISTANT

## 2017-06-07 PROCEDURE — 74011250636 HC RX REV CODE- 250/636: Performed by: INTERNAL MEDICINE

## 2017-06-07 PROCEDURE — 80053 COMPREHEN METABOLIC PANEL: CPT | Performed by: SURGERY

## 2017-06-07 PROCEDURE — 74011250636 HC RX REV CODE- 250/636: Performed by: PHYSICIAN ASSISTANT

## 2017-06-07 PROCEDURE — 0D9670Z DRAINAGE OF STOMACH WITH DRAINAGE DEVICE, VIA NATURAL OR ARTIFICIAL OPENING: ICD-10-PCS | Performed by: SURGERY

## 2017-06-07 RX ORDER — MIDAZOLAM HYDROCHLORIDE 1 MG/ML
1 INJECTION, SOLUTION INTRAMUSCULAR; INTRAVENOUS
Status: DISCONTINUED | OUTPATIENT
Start: 2017-06-07 | End: 2017-06-07 | Stop reason: HOSPADM

## 2017-06-07 RX ORDER — HYDROMORPHONE HYDROCHLORIDE 1 MG/ML
0.2 INJECTION, SOLUTION INTRAMUSCULAR; INTRAVENOUS; SUBCUTANEOUS
Status: COMPLETED | OUTPATIENT
Start: 2017-06-07 | End: 2017-06-07

## 2017-06-07 RX ORDER — FENTANYL CITRATE 50 UG/ML
50 INJECTION, SOLUTION INTRAMUSCULAR; INTRAVENOUS AS NEEDED
Status: DISCONTINUED | OUTPATIENT
Start: 2017-06-07 | End: 2017-06-07 | Stop reason: HOSPADM

## 2017-06-07 RX ORDER — SODIUM CHLORIDE 0.9 % (FLUSH) 0.9 %
5-10 SYRINGE (ML) INJECTION AS NEEDED
Status: DISCONTINUED | OUTPATIENT
Start: 2017-06-07 | End: 2017-06-07 | Stop reason: HOSPADM

## 2017-06-07 RX ORDER — FENTANYL CITRATE 50 UG/ML
INJECTION, SOLUTION INTRAMUSCULAR; INTRAVENOUS
Status: COMPLETED
Start: 2017-06-07 | End: 2017-06-07

## 2017-06-07 RX ORDER — SODIUM CHLORIDE 0.9 % (FLUSH) 0.9 %
5-10 SYRINGE (ML) INJECTION EVERY 8 HOURS
Status: DISCONTINUED | OUTPATIENT
Start: 2017-06-07 | End: 2017-06-07 | Stop reason: HOSPADM

## 2017-06-07 RX ORDER — BUPROPION HYDROCHLORIDE 150 MG/1
150 TABLET, EXTENDED RELEASE ORAL 2 TIMES DAILY
Status: DISCONTINUED | OUTPATIENT
Start: 2017-06-07 | End: 2017-06-07

## 2017-06-07 RX ORDER — ONDANSETRON 2 MG/ML
INJECTION INTRAMUSCULAR; INTRAVENOUS
Status: COMPLETED
Start: 2017-06-07 | End: 2017-06-07

## 2017-06-07 RX ORDER — ALBUTEROL SULFATE 90 UG/1
2 AEROSOL, METERED RESPIRATORY (INHALATION)
Status: DISCONTINUED | OUTPATIENT
Start: 2017-06-07 | End: 2017-06-07 | Stop reason: CLARIF

## 2017-06-07 RX ORDER — FENTANYL CITRATE 50 UG/ML
25 INJECTION, SOLUTION INTRAMUSCULAR; INTRAVENOUS
Status: COMPLETED | OUTPATIENT
Start: 2017-06-07 | End: 2017-06-07

## 2017-06-07 RX ORDER — PROMETHAZINE HYDROCHLORIDE 25 MG/1
25 TABLET ORAL
COMMUNITY
End: 2017-08-24

## 2017-06-07 RX ORDER — OXYCODONE HYDROCHLORIDE 5 MG/1
5 TABLET ORAL AS NEEDED
Status: DISCONTINUED | OUTPATIENT
Start: 2017-06-07 | End: 2017-06-07 | Stop reason: HOSPADM

## 2017-06-07 RX ORDER — ACETAMINOPHEN 10 MG/ML
1000 INJECTION, SOLUTION INTRAVENOUS EVERY 8 HOURS
Status: COMPLETED | OUTPATIENT
Start: 2017-06-07 | End: 2017-06-08

## 2017-06-07 RX ORDER — HYDROMORPHONE HYDROCHLORIDE 1 MG/ML
INJECTION, SOLUTION INTRAMUSCULAR; INTRAVENOUS; SUBCUTANEOUS AS NEEDED
Status: DISCONTINUED | OUTPATIENT
Start: 2017-06-07 | End: 2017-06-07 | Stop reason: HOSPADM

## 2017-06-07 RX ORDER — SODIUM CHLORIDE 0.9 % (FLUSH) 0.9 %
SYRINGE (ML) INJECTION
Status: COMPLETED
Start: 2017-06-07 | End: 2017-06-07

## 2017-06-07 RX ORDER — SODIUM CHLORIDE, SODIUM LACTATE, POTASSIUM CHLORIDE, CALCIUM CHLORIDE 600; 310; 30; 20 MG/100ML; MG/100ML; MG/100ML; MG/100ML
125 INJECTION, SOLUTION INTRAVENOUS CONTINUOUS
Status: DISCONTINUED | OUTPATIENT
Start: 2017-06-07 | End: 2017-06-07 | Stop reason: HOSPADM

## 2017-06-07 RX ORDER — DIPHENHYDRAMINE HYDROCHLORIDE 50 MG/ML
12.5 INJECTION, SOLUTION INTRAMUSCULAR; INTRAVENOUS AS NEEDED
Status: DISCONTINUED | OUTPATIENT
Start: 2017-06-07 | End: 2017-06-07 | Stop reason: HOSPADM

## 2017-06-07 RX ORDER — ONDANSETRON 2 MG/ML
4 INJECTION INTRAMUSCULAR; INTRAVENOUS AS NEEDED
Status: DISCONTINUED | OUTPATIENT
Start: 2017-06-07 | End: 2017-06-07 | Stop reason: HOSPADM

## 2017-06-07 RX ORDER — HYDROMORPHONE HCL IN 0.9% NACL 15 MG/30ML
PATIENT CONTROLLED ANALGESIA VIAL INTRAVENOUS CONTINUOUS
Status: DISCONTINUED | OUTPATIENT
Start: 2017-06-07 | End: 2017-06-12

## 2017-06-07 RX ORDER — ACETYLCYSTEINE 200 MG/ML
200 SOLUTION ORAL; RESPIRATORY (INHALATION)
Status: COMPLETED | OUTPATIENT
Start: 2017-06-07 | End: 2017-06-07

## 2017-06-07 RX ORDER — FLUTICASONE FUROATE AND VILANTEROL 200; 25 UG/1; UG/1
1 POWDER RESPIRATORY (INHALATION) DAILY
Status: ON HOLD | COMMUNITY
End: 2017-10-17

## 2017-06-07 RX ORDER — BUPROPION HYDROCHLORIDE 300 MG/1
300 TABLET ORAL
Status: DISCONTINUED | OUTPATIENT
Start: 2017-06-07 | End: 2017-06-07 | Stop reason: CLARIF

## 2017-06-07 RX ORDER — SODIUM CHLORIDE 9 MG/ML
100 INJECTION, SOLUTION INTRAVENOUS CONTINUOUS
Status: DISCONTINUED | OUTPATIENT
Start: 2017-06-07 | End: 2017-06-10

## 2017-06-07 RX ORDER — ALBUTEROL SULFATE 0.83 MG/ML
2.5 SOLUTION RESPIRATORY (INHALATION)
Status: DISCONTINUED | OUTPATIENT
Start: 2017-06-07 | End: 2017-06-21 | Stop reason: HOSPADM

## 2017-06-07 RX ORDER — BUPROPION HYDROCHLORIDE 150 MG/1
150 TABLET, EXTENDED RELEASE ORAL 2 TIMES DAILY
Status: DISCONTINUED | OUTPATIENT
Start: 2017-06-07 | End: 2017-06-07 | Stop reason: SDUPTHER

## 2017-06-07 RX ORDER — HYDROGEN PEROXIDE 3 %
20 SOLUTION, NON-ORAL MISCELLANEOUS DAILY
COMMUNITY
End: 2017-08-24

## 2017-06-07 RX ORDER — IPRATROPIUM BROMIDE AND ALBUTEROL SULFATE 2.5; .5 MG/3ML; MG/3ML
3 SOLUTION RESPIRATORY (INHALATION)
Status: DISCONTINUED | OUTPATIENT
Start: 2017-06-07 | End: 2017-06-09

## 2017-06-07 RX ORDER — FENTANYL 100 UG/H
1 PATCH TRANSDERMAL
Status: DISCONTINUED | OUTPATIENT
Start: 2017-06-07 | End: 2017-06-07

## 2017-06-07 RX ORDER — MORPHINE SULFATE 10 MG/ML
2 INJECTION, SOLUTION INTRAMUSCULAR; INTRAVENOUS
Status: DISCONTINUED | OUTPATIENT
Start: 2017-06-07 | End: 2017-06-07 | Stop reason: HOSPADM

## 2017-06-07 RX ORDER — ZOLPIDEM TARTRATE 5 MG/1
20 TABLET ORAL
Status: DISCONTINUED | OUTPATIENT
Start: 2017-06-07 | End: 2017-06-07

## 2017-06-07 RX ORDER — LORAZEPAM 2 MG/ML
2 INJECTION INTRAMUSCULAR
Status: COMPLETED | OUTPATIENT
Start: 2017-06-07 | End: 2017-06-07

## 2017-06-07 RX ORDER — LIDOCAINE HYDROCHLORIDE 10 MG/ML
0.5 INJECTION, SOLUTION EPIDURAL; INFILTRATION; INTRACAUDAL; PERINEURAL AS NEEDED
Status: DISCONTINUED | OUTPATIENT
Start: 2017-06-07 | End: 2017-06-07 | Stop reason: HOSPADM

## 2017-06-07 RX ORDER — MAGNESIUM HYDROXIDE 400 MG/5ML
5000 SUSPENSION, ORAL (FINAL DOSE FORM) ORAL
COMMUNITY
End: 2017-08-24

## 2017-06-07 RX ORDER — METHOCARBAMOL 750 MG/1
750 TABLET, FILM COATED ORAL 4 TIMES DAILY
Status: DISCONTINUED | OUTPATIENT
Start: 2017-06-07 | End: 2017-06-07

## 2017-06-07 RX ORDER — MIDAZOLAM HYDROCHLORIDE 1 MG/ML
1 INJECTION, SOLUTION INTRAMUSCULAR; INTRAVENOUS AS NEEDED
Status: DISCONTINUED | OUTPATIENT
Start: 2017-06-07 | End: 2017-06-07 | Stop reason: HOSPADM

## 2017-06-07 RX ORDER — PANTOPRAZOLE SODIUM 40 MG/10ML
40 INJECTION, POWDER, LYOPHILIZED, FOR SOLUTION INTRAVENOUS EVERY 24 HOURS
Status: DISCONTINUED | OUTPATIENT
Start: 2017-06-07 | End: 2017-06-07 | Stop reason: SDUPTHER

## 2017-06-07 RX ORDER — ACETYLCYSTEINE 100 MG/ML
2 SOLUTION ORAL; RESPIRATORY (INHALATION)
Status: DISCONTINUED | OUTPATIENT
Start: 2017-06-07 | End: 2017-06-07

## 2017-06-07 RX ORDER — ONDANSETRON 2 MG/ML
4 INJECTION INTRAMUSCULAR; INTRAVENOUS
Status: DISCONTINUED | OUTPATIENT
Start: 2017-06-07 | End: 2017-06-21 | Stop reason: HOSPADM

## 2017-06-07 RX ORDER — HYDROMORPHONE HYDROCHLORIDE 1 MG/ML
1 INJECTION, SOLUTION INTRAMUSCULAR; INTRAVENOUS; SUBCUTANEOUS
Status: DISCONTINUED | OUTPATIENT
Start: 2017-06-07 | End: 2017-06-09

## 2017-06-07 RX ORDER — ALBUTEROL SULFATE 4 MG/1
4 TABLET, FILM COATED, EXTENDED RELEASE ORAL
Status: ON HOLD | COMMUNITY
End: 2017-10-17

## 2017-06-07 RX ORDER — DEXMEDETOMIDINE HYDROCHLORIDE 4 UG/ML
INJECTION, SOLUTION INTRAVENOUS AS NEEDED
Status: DISCONTINUED | OUTPATIENT
Start: 2017-06-07 | End: 2017-06-07 | Stop reason: HOSPADM

## 2017-06-07 RX ORDER — DEXTROSE, SODIUM CHLORIDE, SODIUM LACTATE, POTASSIUM CHLORIDE, AND CALCIUM CHLORIDE 5; .6; .31; .03; .02 G/100ML; G/100ML; G/100ML; G/100ML; G/100ML
125 INJECTION, SOLUTION INTRAVENOUS CONTINUOUS
Status: DISCONTINUED | OUTPATIENT
Start: 2017-06-07 | End: 2017-06-07 | Stop reason: HOSPADM

## 2017-06-07 RX ORDER — LORAZEPAM 2 MG/ML
INJECTION INTRAMUSCULAR
Status: COMPLETED
Start: 2017-06-07 | End: 2017-06-07

## 2017-06-07 RX ADMIN — ACETYLCYSTEINE 200 MG: 200 SOLUTION ORAL; RESPIRATORY (INHALATION) at 11:36

## 2017-06-07 RX ADMIN — ACETAMINOPHEN 1000 MG: 10 INJECTION, SOLUTION INTRAVENOUS at 11:15

## 2017-06-07 RX ADMIN — ONDANSETRON 4 MG: 2 INJECTION INTRAMUSCULAR; INTRAVENOUS at 08:09

## 2017-06-07 RX ADMIN — IPRATROPIUM BROMIDE AND ALBUTEROL SULFATE 3 ML: .5; 3 SOLUTION RESPIRATORY (INHALATION) at 11:35

## 2017-06-07 RX ADMIN — SODIUM CHLORIDE 75 ML/HR: 900 INJECTION, SOLUTION INTRAVENOUS at 16:22

## 2017-06-07 RX ADMIN — HYDROMORPHONE HYDROCHLORIDE 1 MG: 1 INJECTION, SOLUTION INTRAMUSCULAR; INTRAVENOUS; SUBCUTANEOUS at 01:43

## 2017-06-07 RX ADMIN — HYDROMORPHONE HYDROCHLORIDE 0.2 MG: 1 INJECTION, SOLUTION INTRAMUSCULAR; INTRAVENOUS; SUBCUTANEOUS at 02:15

## 2017-06-07 RX ADMIN — PIPERACILLIN SODIUM,TAZOBACTAM SODIUM 3.38 G: 3; .375 INJECTION, POWDER, FOR SOLUTION INTRAVENOUS at 14:24

## 2017-06-07 RX ADMIN — Medication: at 03:00

## 2017-06-07 RX ADMIN — HYDROMORPHONE HYDROCHLORIDE 0.2 MG: 1 INJECTION, SOLUTION INTRAMUSCULAR; INTRAVENOUS; SUBCUTANEOUS at 03:00

## 2017-06-07 RX ADMIN — HYDROMORPHONE HYDROCHLORIDE 1 MG: 1 INJECTION, SOLUTION INTRAMUSCULAR; INTRAVENOUS; SUBCUTANEOUS at 16:30

## 2017-06-07 RX ADMIN — SODIUM CHLORIDE 40 MG: 9 INJECTION INTRAMUSCULAR; INTRAVENOUS; SUBCUTANEOUS at 09:01

## 2017-06-07 RX ADMIN — PIPERACILLIN SODIUM,TAZOBACTAM SODIUM 3.38 G: 3; .375 INJECTION, POWDER, FOR SOLUTION INTRAVENOUS at 06:41

## 2017-06-07 RX ADMIN — SODIUM CHLORIDE 75 ML/HR: 900 INJECTION, SOLUTION INTRAVENOUS at 09:18

## 2017-06-07 RX ADMIN — ACETAMINOPHEN 1000 MG: 10 INJECTION, SOLUTION INTRAVENOUS at 18:17

## 2017-06-07 RX ADMIN — HYDROMORPHONE HYDROCHLORIDE 1 MG: 2 INJECTION, SOLUTION INTRAMUSCULAR; INTRAVENOUS; SUBCUTANEOUS at 01:55

## 2017-06-07 RX ADMIN — ONDANSETRON 4 MG: 2 INJECTION INTRAMUSCULAR; INTRAVENOUS at 22:37

## 2017-06-07 RX ADMIN — HYDROMORPHONE HYDROCHLORIDE 1 MG: 1 INJECTION, SOLUTION INTRAMUSCULAR; INTRAVENOUS; SUBCUTANEOUS at 18:18

## 2017-06-07 RX ADMIN — HYDROMORPHONE HYDROCHLORIDE 1 MG: 1 INJECTION, SOLUTION INTRAMUSCULAR; INTRAVENOUS; SUBCUTANEOUS at 22:37

## 2017-06-07 RX ADMIN — SODIUM CHLORIDE 500 ML: 900 INJECTION, SOLUTION INTRAVENOUS at 18:13

## 2017-06-07 RX ADMIN — HYDROMORPHONE HYDROCHLORIDE 1 MG: 1 INJECTION, SOLUTION INTRAMUSCULAR; INTRAVENOUS; SUBCUTANEOUS at 14:23

## 2017-06-07 RX ADMIN — Medication 5 ML: at 03:00

## 2017-06-07 RX ADMIN — CHLORPROMAZINE HYDROCHLORIDE 25 MG: 25 INJECTION INTRAMUSCULAR at 16:21

## 2017-06-07 RX ADMIN — SODIUM CHLORIDE 10 MG: 9 INJECTION INTRAMUSCULAR; INTRAVENOUS; SUBCUTANEOUS at 11:15

## 2017-06-07 RX ADMIN — SODIUM CHLORIDE, SODIUM LACTATE, POTASSIUM CHLORIDE, AND CALCIUM CHLORIDE 125 ML/HR: 600; 310; 30; 20 INJECTION, SOLUTION INTRAVENOUS at 06:32

## 2017-06-07 RX ADMIN — HYDROMORPHONE HYDROCHLORIDE 1 MG: 1 INJECTION, SOLUTION INTRAMUSCULAR; INTRAVENOUS; SUBCUTANEOUS at 09:03

## 2017-06-07 RX ADMIN — ONDANSETRON 4 MG: 2 INJECTION INTRAMUSCULAR; INTRAVENOUS at 03:28

## 2017-06-07 RX ADMIN — DEXMEDETOMIDINE HYDROCHLORIDE 35 MCG: 4 INJECTION, SOLUTION INTRAVENOUS at 00:42

## 2017-06-07 RX ADMIN — ACETYLCYSTEINE 200 MG: 200 SOLUTION ORAL; RESPIRATORY (INHALATION) at 19:11

## 2017-06-07 RX ADMIN — HYDROMORPHONE HYDROCHLORIDE 1 MG: 1 INJECTION, SOLUTION INTRAMUSCULAR; INTRAVENOUS; SUBCUTANEOUS at 20:26

## 2017-06-07 RX ADMIN — ACETYLCYSTEINE 200 MG: 200 SOLUTION ORAL; RESPIRATORY (INHALATION) at 15:11

## 2017-06-07 RX ADMIN — HYDROMORPHONE HYDROCHLORIDE 1 MG: 1 INJECTION, SOLUTION INTRAMUSCULAR; INTRAVENOUS; SUBCUTANEOUS at 03:40

## 2017-06-07 RX ADMIN — MIDAZOLAM HYDROCHLORIDE 1 MG: 1 INJECTION, SOLUTION INTRAMUSCULAR; INTRAVENOUS at 04:15

## 2017-06-07 RX ADMIN — HYDROMORPHONE HYDROCHLORIDE 1 MG: 2 INJECTION, SOLUTION INTRAMUSCULAR; INTRAVENOUS; SUBCUTANEOUS at 01:50

## 2017-06-07 RX ADMIN — PIPERACILLIN SODIUM,TAZOBACTAM SODIUM 3.38 G: 3; .375 INJECTION, POWDER, FOR SOLUTION INTRAVENOUS at 22:33

## 2017-06-07 RX ADMIN — LORAZEPAM 2 MG: 2 INJECTION INTRAMUSCULAR; INTRAVENOUS at 02:30

## 2017-06-07 RX ADMIN — HYDROMORPHONE HYDROCHLORIDE 0.2 MG: 1 INJECTION, SOLUTION INTRAMUSCULAR; INTRAVENOUS; SUBCUTANEOUS at 02:30

## 2017-06-07 RX ADMIN — HYDROMORPHONE HYDROCHLORIDE 1 MG: 1 INJECTION, SOLUTION INTRAMUSCULAR; INTRAVENOUS; SUBCUTANEOUS at 01:39

## 2017-06-07 RX ADMIN — HYDROMORPHONE HYDROCHLORIDE 0.2 MG: 1 INJECTION, SOLUTION INTRAMUSCULAR; INTRAVENOUS; SUBCUTANEOUS at 02:45

## 2017-06-07 RX ADMIN — HYDROMORPHONE HYDROCHLORIDE 1 MG: 1 INJECTION, SOLUTION INTRAMUSCULAR; INTRAVENOUS; SUBCUTANEOUS at 01:47

## 2017-06-07 RX ADMIN — FENTANYL CITRATE 25 MCG: 50 INJECTION, SOLUTION INTRAMUSCULAR; INTRAVENOUS at 02:10

## 2017-06-07 RX ADMIN — FENTANYL CITRATE 25 MCG: 50 INJECTION, SOLUTION INTRAMUSCULAR; INTRAVENOUS at 02:15

## 2017-06-07 RX ADMIN — IPRATROPIUM BROMIDE AND ALBUTEROL SULFATE 3 ML: .5; 3 SOLUTION RESPIRATORY (INHALATION) at 15:11

## 2017-06-07 RX ADMIN — FENTANYL CITRATE 25 MCG: 50 INJECTION, SOLUTION INTRAMUSCULAR; INTRAVENOUS at 02:20

## 2017-06-07 RX ADMIN — ACETAMINOPHEN 1000 MG: 10 INJECTION, SOLUTION INTRAVENOUS at 04:19

## 2017-06-07 RX ADMIN — LORAZEPAM 2 MG: 2 INJECTION INTRAMUSCULAR at 02:30

## 2017-06-07 RX ADMIN — ONDANSETRON 4 MG: 2 INJECTION INTRAMUSCULAR; INTRAVENOUS at 01:28

## 2017-06-07 RX ADMIN — IPRATROPIUM BROMIDE AND ALBUTEROL SULFATE 3 ML: .5; 3 SOLUTION RESPIRATORY (INHALATION) at 19:11

## 2017-06-07 RX ADMIN — HYDROMORPHONE HYDROCHLORIDE 0.2 MG: 1 INJECTION, SOLUTION INTRAMUSCULAR; INTRAVENOUS; SUBCUTANEOUS at 03:15

## 2017-06-07 RX ADMIN — FENTANYL CITRATE 25 MCG: 50 INJECTION, SOLUTION INTRAMUSCULAR; INTRAVENOUS at 02:05

## 2017-06-07 RX ADMIN — Medication: at 16:09

## 2017-06-07 RX ADMIN — MIDAZOLAM HYDROCHLORIDE 1 MG: 1 INJECTION, SOLUTION INTRAMUSCULAR; INTRAVENOUS at 04:00

## 2017-06-07 NOTE — PROCEDURES
[address] [facility] [address], [address] [facility]        ENTEROSCOPY PROCEDURE NOTE      Aziza Yip  1975  [id number]      Procedure: Enteroscopy    Indication: intraoperative enteroscopy to help identify site of suspected perforation    :  Yosef Bearden MD    Referring Provider: Cynthia Campos MD      Anesthesia/Sedation: General anesthesia . Procedure Details  Please see Dr. Maria Guadalupe Saleem notes and consent form for surgery. Intraoperative enteroscopy was requested to help identify site of suspected perforation. The pediatric colonoscope was inserted into the mouth and advanced under direct vision to the distal small bowel. Based on operative findings, the scope was maximally inserted to approximately 20 cm beyond the ligament of Treitz. The small intestine was significantly matted due to adhesions and much of the surgery was lysing the adhesions. Please see Dr. Maria Guadalupe Saleem notes for details. With intraoperative assistance, the site of suspected perforation was identified endoscopically. The previously placed Resolution clip was seen at this site. This did not appear to be full thickness. It is possible that this was a microperforation. Dr. Patricia Thompson oversewed this site and applied Tiseel. There was evidence of small bowel and jejunal ulceration. EBL: none      Therapies: See above      Specimens: See above    Complications: None; patient tolerated the procedure well. Impression:   1. Full thickness perforation was not appreciated intraoperatively. Site oversewn and Tiseel applied intraoperatively for presumptive microperforation. Recommendations:  1. Please see operative note for full details  2. Appreciate Surgery team's involvement and care  3. Will follow      Yosef Martinez.  Maty Bearden MD    6/7/2017 1:14 AM

## 2017-06-07 NOTE — PROGRESS NOTES
Shift Summary:  Patient alert and oriented x4. NSR on the monitor. Strong productive cough on RA. Midline abdominal incision with dry dressing c/d/i, CHRIS draining serosanguinous. Severe abdominal pain along incision reported despite Dilaudid PCA. Gage CHIANG notified that pain not controlled, basal PCA and PRN dilaudid adjusted and administered. Reports some relief. NGT to LWS, minimal output, End ileostomy with minimal output. Voiding lisa cloudy urine. Patients mom at bedside. 1745- Patients HR up to 110s, BP stable, UOP concentrated/decreased. CVP set up and currently 2-3. Dr. Netta Casillas updated on patients status, orders received.

## 2017-06-07 NOTE — PROGRESS NOTES
TRANSFER - OUT REPORT:    Verbal report given to Kaylen girard(name) on Jf Cuello  being transferred to ICU6(unit) for routine progression of care       Report consisted of patients Situation, Background, Assessment and   Recommendations(SBAR). Time Pre op antibiotic given:1945  Anesthesia Stop time: 0205  Reilly Present on Transfer to floor:y  Order for Reilly on Chart:y    Information from the following report(s) SBAR, Kardex, Procedure Summary, Intake/Output and MAR was reviewed with the receiving nurse. Opportunity for questions and clarification was provided. Is the patient on 02? YES       L/Min 2       Other     Is the patient on a monitor? YES    Is the nurse transporting with the patient? YES    Surgical Waiting Area notified of patient's transfer from PACU?  YES      The following personal items collected during your admission accompanied patient upon transfer:   Dental Appliance: Dental Appliances: None  Vision: Visual Aid: Glasses  Hearing Aid:    Jewelry:    Clothing:    Other Valuables:    Valuables sent to safe:

## 2017-06-07 NOTE — PERIOP NOTES
Family was called at [phone] and was given an update of the patients status as per surgeons request.

## 2017-06-07 NOTE — PROGRESS NOTES
[address] [facility] [address], 090 [facility]    GI PROGRESS NOTE    NAME: Mario Ledezma   :  1975   MRN:  [id number]       Subjective:   Complaining of nausea and abdominal pain    Review of Systems    Constitutional: negative fever, negative chills, negative weight loss  Eyes:   negative visual changes  ENT:   negative sore throat, tongue or lip swelling  Respiratory:  negative cough, negative dyspnea  Cards:  negative for chest pain, palpitations, lower extremity edema  GI:   See HPI  :  negative for frequency, dysuria  Integument:  negative for rash and pruritus  Heme:  negative for easy bruising and gum/nose bleeding  Musculoskel: negative for myalgias,  back pain and muscle weakness  Neuro: negative for headaches, dizziness, vertigo  Psych:  negative for feelings of anxiety, depression         Objective:     VITALS:   Last 24hrs VS reviewed since prior progress note. Most recent are:  Visit Vitals    BP (!) 144/91    Pulse 86    Temp 99.6 °F (37.6 °C)    Resp 18    Ht 5' 4\" (1.626 m)    Wt 150.5 kg (331 lb 12.8 oz)    SpO2 98%    BMI 56.95 kg/m2       Intake/Output Summary (Last 24 hours) at 17 1052  Last data filed at 17 0800   Gross per 24 hour   Intake             3450 ml   Output             2055 ml   Net             1395 ml     PHYSICAL EXAM:  General: WD, WN. Alert, cooperative, no acute distress    HEENT: NC, Atraumatic. Anicteric sclerae. Lungs:  Active wheezing bilaterally  Heart:  Regular  rhythm,  No murmur (), No Rubs, No Gallops  Abdomen: Soft, + bowel sounds, TTP   Neurologic:  Alert and oriented X 3. No acute neurological distress   Psych:   Good insight. Not anxious nor agitated.     Lab Data Reviewed:   Recent Labs      17   0234   WBC  23.6*   HGB  14.5   HCT  44.7   PLT  164     Recent Labs      17   0234   NA  140   K  4.0   CL  104   CO2  24   BUN  17   CREA  0.96   GLU  151*   CA  8.5     Recent Labs 06/07/17   0234   SGOT  27   AP  56   TP  5.9*   ALB  2.7*   GLOB  3.2       ________________________________________________________________________       Assessment:   · Crohn's disease with complex surgical history including multiple small bowel resections, colectomy with end ileostomy and rectal stump  · Retained capsule endoscopy (not identified intraoperatively and on abdominal plain film suggesting that this must have passed through ileostomy). · Partial thickness small bowel perforation vs microperforation - oversewn intraoperatively  · Extensive adhesion lysis intraoperatively  · Small bowel mucosal ulceration seen endoscopically consistent with active Crohn's disease. Plan:   · Continue anti-emetics. Okay to use compazine as well  · Continue antibiotics  · Continue supportive post-operative care  · Would defer steroid treatment at this time. Her outpatient dose was 20 mg daily as part of a long taper  · Will hold off on restarting Crohn's medications at this time. This will need to be done as an outpatient following recovery from surgery  · Appreciate Surgery and ICU team's care  · Please call with questions     Signed By: Evangelist Campos.  William Darilng MD     6/7/2017  10:52 AM

## 2017-06-07 NOTE — PERIOP NOTES
Multiple piercings noted to patient's bilateral nipples and earlobes. Unable to remove in OR. Secured with tape per hospital policy. Upper body paul hugger in place above nipple line for safety purposes. Wilbur Mcfadden RN

## 2017-06-07 NOTE — PERIOP NOTES
Tisseel 10ml delivered to sterile field and administered to patient by Dr. Apurva Johnson.     LOT  [id number]    EXP  JUL 31 2018

## 2017-06-07 NOTE — ROUTINE PROCESS
Patient: Nitin Patterson MRN: [id number]  SSN: [id number]   YOB: 1975  Age: 46 y.o. Sex: female     Patient is status post Procedure(s):  Diagnostic Laparoscopy, converted to open Exploratory Laparotomy, Lysis of adhesions for 7 hours/ Intraoperative Upper Endoscopy by Dr. Chayito García. Denny Slater) and Role:     * Yandy Ye MD - Primary     * Cain Christianson MD - Assisting    Local/Dose/Irrigation:              Adron Moynahan 06/06/17 Right Internal jugular (Active)        Peripheral IV 06/06/17 Right Hand (Active)   Site Assessment Clean, dry, & intact 6/6/2017  3:54 PM   Phlebitis Assessment 0 6/6/2017  3:54 PM   Infiltration Assessment 0 6/6/2017  3:54 PM   Dressing Status New 6/6/2017  3:54 PM   Hub Color/Line Status Yellow; Infusing 6/6/2017  3:54 PM          Nasogastric Tube 06/06/17 (Active)       Lily Amy #1 06/07/17 Anterior; Lower;Right Abdomen (Active)   Site Assessment Clean, dry, & intact 6/7/2017 12:33 AM   Dressing Status Clean, dry, & intact 6/7/2017 12:33 AM   Drainage Description Sanguinous 6/7/2017 12:33 AM   Status Patent; Charged 6/7/2017 12:33 AM      Airway - Endotracheal Tube 06/06/17 Oral (Active)                   Dressing/Packing:  Wound Abdomen Anterior-DRESSING TYPE: 4 x 4;Special tape (comment); Topical skin adhesive/glue (dermabond x 1 trocar site) (06/07/17 0100)  Splint/Cast:  ]    Other:  Large bandaid on drain. Colostomy bag.

## 2017-06-07 NOTE — PROGRESS NOTES
Pulmonary, Critical Care, and Sleep Medicine~Progress Note    Name: Carlos Mayo MRN: [id number]   : 1975 Hospital: [address]   Date: 2017 8:54 AM Admission: 2017     IMPRESSION:   · S/p 17: diagnostic lap converted to converted to exploratory lap for abdominal free air with retained foreign body  · hx of crohn's disease   · Acute bronchitis   · Likely has RIGOBERTO. Undx. · Hx of DVT  · Chronic pain       PLAN:   · On zosyn   · SCDs/ Prtonix   · Sputum cxs  · Chest film  · Pain control   · Duonebs, few doses of mucomyst  · Advance diet per surgery/GI  · Discussed with attending and RN  · Can move out to [facility]     Daily Progression:    + pain, + extensive congestion.      OBJECTIVE:     Vital Signs:       Visit Vitals    /88 (BP 1 Location: Left arm, BP Patient Position: At rest)    Pulse 74    Temp 99.6 °F (37.6 °C)    Resp 20    Ht 5' 4\" (1.626 m)    Wt 150.5 kg (331 lb 12.8 oz)    LMP 2017 (Approximate)    SpO2 95%    BMI 56.95 kg/m2      Temp (24hrs), Av °F (37.2 °C), Min:97.6 °F (36.4 °C), Max:99.6 °F (37.6 °C)     Intake/Output:     Last shift: 701 -  1900  In: -   Out: 350 [Urine:160; Drains:60]    Last 3 shifts: 1901 -  0700  In: 3250 [I.V.:3250]  Out: 1705 [Urine:1105; Drains:150]        Intake/Output Summary (Last 24 hours) at 17 0854  Last data filed at 17 0800   Gross per 24 hour   Intake             3250 ml   Output             2055 ml   Net             1195 ml       Physical Exam:                                        Exam Findings Other   General: No resp distress noted, appears stated age    HEENT:  No ulcers, JVD not elevated, no cervical LAD    Chest: No pectus deformity, normal chest rise b/l    HEART:  RRR, no murmurs/rubs/gallops    Lungs:  Coarse     ABD: Abdominal pains as expected     EXT: No cyanosis/clubbing/edema, normal peripheral pulses    Skin: No rashes or ulcers, no mottling    Neuro: A/O x 3        Medications:  Current Facility-Administered Medications   Medication Dose Route Frequency    HYDROmorphone (PF) 15 mg/30 ml (DILAUDID) PCA   IntraVENous CONTINUOUS    HYDROmorphone (PF) (DILAUDID) injection 1 mg  1 mg IntraVENous Q2H PRN    ondansetron (ZOFRAN) injection 4 mg  4 mg IntraVENous Q4H PRN    acetaminophen (OFIRMEV) infusion 1,000 mg  1,000 mg IntraVENous Q8H    piperacillin-tazobactam (ZOSYN) 3.375 g in 0.9% sodium chloride (MBP/ADV) 100 mL  3.375 g IntraVENous Q8H    albuterol (PROVENTIL VENTOLIN) nebulizer solution 2.5 mg  2.5 mg Nebulization Q4H PRN    pantoprazole (PROTONIX) 40 mg in sodium chloride 0.9 % 10 mL injection  40 mg IntraVENous Q24H    albuterol-ipratropium (DUO-NEB) 2.5 MG-0.5 MG/3 ML  3 mL Nebulization QID RT    acetylcysteine (MUCOMYST) 200 mg/mL (20 %) solution 200 mg  200 mg Nebulization TID RT       Labs:  ABG No results for input(s): PHI, PCO2I, PO2I, HCO3I, SO2I, FIO2I in the last 72 hours.      CBC Recent Labs      06/07/17   0234   WBC  23.6*   HGB  14.5   HCT  44.7   PLT  164   MCV  97.0   MCH  12.1        Metabolic  Panel Recent Labs      06/07/17   0234   NA  140   K  4.0   CL  104   CO2  24   GLU  151*   BUN  17   CREA  0.96   CA  8.5   ALB  2.7*   SGOT  27   ALT  33        Pertinent Labs                Hemant Katz [place]  6/7/2017

## 2017-06-07 NOTE — ANESTHESIA POSTPROCEDURE EVALUATION
Post-Anesthesia Evaluation and Assessment    Patient: Jennifer Burdick MRN: [id number]  SSN: [id number]    YOB: 1975  Age: 46 y.o. Sex: female       Cardiovascular Function/Vital Signs  Visit Vitals    BP (!) 161/93    Pulse 80    Temp 36.4 °C (97.6 °F)    Resp 18    Ht 5' 4\" (1.626 m)    Wt 147 kg (324 lb)    SpO2 95%    BMI 55.61 kg/m2       Patient is status post general anesthesia for Procedure(s):  SMALL BOWEL ENTEROSCOPY (FLUORO)  RESOLUTION CLIP. Nausea/Vomiting: None    Postoperative hydration reviewed and adequate. Pain:  Pain Scale 1: Numeric (0 - 10) (06/06/17 1522)  Pain Intensity 1: 3 (06/06/17 1522)   Managed    Neurological Status: At baseline    Mental Status and Level of Consciousness: Arousable    Pulmonary Status:   O2 Device: Nasal cannula (06/07/17 0201)   Adequate oxygenation and airway patent    Complications related to anesthesia: None    Post-anesthesia assessment completed.  No concerns    Signed By: Tyler Hart MD     June 7, 2017

## 2017-06-07 NOTE — PROGRESS NOTES
Admission Medication Reconciliation:    Information obtained from: Patient, her mother & Rx Query report    Significant PMH/Disease States:   Past Medical History:   Diagnosis Date    Crohn's disease ([facility] 8/15/2011    DVT (deep venous thrombosis) ([facility] 8/15/2011    Incarcerated ventral hernia 8/15/2011    Right flank pain 8/22/2011    Seizures ([facility]     Stroke [facility])        Chief Complaint for this Admission:  Bowel perforation    Allergies:  Demerol [meperidine]; Soma [carisoprodol]; Sulfa (sulfonamide antibiotics); and Toradol [ketorolac tromethamine]    Prior to Admission Medications:   Prior to Admission Medications   Prescriptions Last Dose Informant Patient Reported? Taking? ALPRAZOLAM (XANAX PO) 6/5/2017 at Unknown time  Yes Yes   Sig: Take 1 mg by mouth three (3) times daily as needed. INFLIXIMAB (REMICADE IV) 5/25/2017  Yes Yes   Sig: by IntraVENous route every six (6) weeks. albuterol (PROVENTIL HFA, VENTOLIN HFA, PROAIR HFA) 90 mcg/actuation inhaler 6/6/2017 at Unknown time  Yes Yes   Sig: Take 2 Puffs by inhalation every four (4) hours as needed for Wheezing. albuterol sulfate SR (PROVENTIL SR) 4 mg ER tablet   Yes Yes   Sig: Take 4 mg by mouth daily as needed for Other (Wheezing). Tries to alternate with albuterol MDI when she doesn't need rapid resolution of symptoms   cyanocobalamin, vitamin B-12, 5,000 mcg TbDL   Yes Yes   Sig: Take 5,000 mcg by mouth Every Friday. esomeprazole (NEXIUM) 20 mg capsule   Yes Yes   Sig: Take 20 mg by mouth daily. fluticasone-vilanterol (BREO ELLIPTA) 200-25 mcg/dose inhaler Not Taking at Unknown time  Yes No   Sig: Take 1 Puff by inhalation daily. methylnaltrexone (RELISTOR) 150 mg tab tablet 6/6/2017 at Unknown time  Yes Yes   Sig: Take 150 mg by mouth Daily (before breakfast). morphine CR (MS CONTIN) 60 mg CR tablet 6/6/2017 at Unknown time  Yes Yes   Sig: Take 60 mg by mouth three (3) times daily.    morphine IR (MS IR) 30 mg tablet 6/6/2017 at Unknown time  Yes Yes   Sig: Take 30 mg by mouth every four (4) hours as needed for Pain.   norgestrel-ethinyl estradiol (CRYSELLE, 28,) 0.3-30 mg-mcg tab   Yes Yes   Sig: Take 1 Tab by mouth daily. ondansetron (ZOFRAN ODT) 8 mg disintegrating tablet 6/6/2017 at Unknown time  Yes Yes   Sig: Take 8 mg by mouth every eight (8) hours as needed for Nausea. Tries to alternate with promethazine   predniSONE (DELTASONE) 20 mg tablet 6/6/2017 at Unknown time  Yes Yes   Sig: Take 30 mg by mouth daily. Takes 1.5 of the 20 mg tab   promethazine (PHENERGAN) 25 mg tablet   Yes Yes   Sig: Take 25 mg by mouth every six (6) hours as needed for Nausea. Tries to alternate with ondansetron      Facility-Administered Medications: None       Comments/Recommendations: The patient's PTA medication list was reviewed with the patient, her mother and compared to the Rx Query report. The following changes were made:    1. Bupropion, cholecalciferol, cyanocobalamin injection, fentanyl patch, lansoprazole, magnesium, methocarbamol and zolpidem were removed - these are not current medications. 2. Albuterol ER tabs, Cryselle tabs, esomeprazole, cyanocobalamin lozenge and Breo Ellipta were added. Of note, the patient was just [place] [facility] but has not started it yet. 3. Alprazolam dose of 1 mg PO TID as needed was added  4. Infliximab infusion was changed from Q 12 hr weeks to Q 6 weeks - the patient had her last dose on 5/25/17 at [facility] infusion center (unsure of dose)  5. Morphine IR tabs were changed from Q 4 hr to QID as needed for pain  6. Promethazine was changed from a suppository to a tablet    Thank you for allowing me to participate in the care of this patient. The above list now represents the patient's most up-to-date PTA medication list.  Please call the inpatient pharmacy at [phone] with any questions.

## 2017-06-07 NOTE — OP NOTES
[address]   [place], [address]   OP NOTE       Name:  Maxine Fisher   MR#:  [id number]   :  1975   Account #:  [de-identified]    Surgery Date:  2017   Date of Adm:  2017       PREOPERATIVE DIAGNOSIS: Abdominal free air with retained   foreign body. POSTOPERATIVE DIAGNOSIS: Sealed perforated jejunum. No   evidence of foreign body. PROCEDURES PERFORMED: Diagnostic laparoscopy converted to   exploratory laparotomy with lysis of adhesions for 7 hours. Upper   endoscopy performed by Dr. Rosaura Wilcox. PRIMARY SURGEON: Alysa Lau MD    The upper endoscopy portion was done by Dr. Anand Gal: General endotracheal.    ESTIMATED BLOOD LOSS: 500 mL. SPECIMENS REMOVED: None. FINDINGS: The patient had dense adhesions throughout the abdomen   and pelvis. It took us approximately 7 hours to mobilize the small bowel   from the ligament of Treitz to the end ileostomy. Dr. Rosaura Wilcox performed   the endoscopy which shows a perforation in the proximal jejunum,   approximately 20 cm from the ligament of Treitz. It does not appear to   be a full-thickness perforation. This was oversewn with 2-0 silk suture. We performed lysis of adhesions for the entirety of the small bowel and   did not find the retained capsule for the capsule study. COMPLICATIONS: None. DRAINS AND TUBES: A 19-Faroese Haider drain was placed in the   pelvis and externalized to the right lower quadrant. INDICATION FOR OPERATION: The patient is a 24-year-old woman   with a history of Crohn disease who has undergone a total colectomy   as well as a small bowel resection with end ileostomy and previous   lysis of adhesion. The patient had a capsule study performed at an   outside facility and was noted to develop retained capsule.  She   underwent a single balloon enteroscopy by Dr. Rosaura Wilcox and during the   procedure, there was concern she might have developed a small   bowel perforation. Given these findings, the patient was taken   emergently to the operating room for a laparoscopy, possible   exploratory laparotomy. DESCRIPTION OF PROCEDURE: After informed consent was   obtained from the patient's mother, the patient was taken to the   operating room and placed in a supine position on the operating room   table. She was already intubated from her endoscopy procedure. The   abdomen and pelvis were then prepped and draped in the usual sterile   surgical fashion. A Reilly catheter was inserted. The patient's abdomen   was prepped and draped, and a surgical time-out was performed. Preoperative antibiotic was given 30 minutes prior to skin incision. After the time-out was performed, a left upper quadrant incision was   made with an 11 blade scalpel. We then placed a 5 mm trocar with a 0   laparoscope into the peritoneal cavity under direct laparoscopic   visualization. Upon entering the peritoneal cavity, we could not achieve   insufflation due to the dense adhesion. At this point, the decision was   made to convert the operation to an open operation. A midline incision   was then made from the gastric area to the infraumbilical area. The   dissection was then taken down using a combination of blunt   dissection, sharp dissection and electrocautery. The peritoneal cavity   was then entered without any complication. Upon entering the   peritoneal cavity, we noted dense adhesions. With meticulous   dissection, we tried to free up the small bowel. We were unable to   locate the site of perforation, so Dr. Edel Whipple was requested to come into   the operating room to assist with identifying the location of the   perforation and then the scope was then placed. Dr. Edel Whipple performed   the upper endoscopy in the operating room.  At this point, we were not   able to advance the scope, so without being able to identify the   perforation site, we were committed to doing the lysis of adhesion to free up the entire length of the small bowel. This took approximately 7   hours to perform. There were no enterotomies noted or serosal tear   during the procedure; however, the patient did have some mesenteric   vessels that had to be ligated. There was no evidence of bowel   ischemia after the ligation of the mesenteric vessels. Once the small bowel was freed from the ligament of Treitz to the end   ileostomy, we then asked Dr. Kellee Elizabeth to perform the endoscope again. He was able to identify a clip in the proximal jejunum, approximately 20   cm from the ligament of Treitz. There appears to be a perforation at   this site that might have sealed off. A 3-0 silk suture was then used to   close this questionable perforation site. Tiseel was then applied over this. Next, we attempted to locate the camera pill that the patient had taken,   which supposedly was caught in an area of stricture. We ran the small   bowel again from the ligament of Treitz to the end ileostomy. There   were several areas of small bowel that were kinked, but there was no   evidence of stricture. The small bowel was run in its entirety and we   were unable to find the camera capsule that she swallowed. At this   point, the abdomen and pelvis was copiously irrigated. A 19-Slovak   Haider drain was placed in the pelvis and was externalized to the right   lower quadrant. The drain was secured with 2-0 nylon. The fascia was   then closed with #1 looped PDS in a running fashion. Skin staples   were then used to reapproximate the skin. The patient tolerated the procedure well. There were no complications   associated with the operation. Dr. Dillon See, the attending surgeon,   was present during the entirety of the case. All lap, needle, and   instrument counts were correct x2. The patient was successfully   extubated and transported to the PACU in stable condition.         Janae Rollins MD      SS / RADHA   D:  06/07/2017   03:09   T: 06/07/2017   03:45   Job #:  [id number]

## 2017-06-07 NOTE — PROGRESS NOTES
Progress Note    Patient: Aziza Yip MRN: [id number]  SSN: [id number]    YOB: 1975  Age: 46 y.o. Sex: female      Admit Date: 2017    1 Day Post-Op    Procedure:  Procedure(s):  Diagnostic Laparoscopy, converted to open Exploratory Laparotomy, Lysis of adhesions for 7 hours/ Intraoperative Upper Endoscopy by Dr. Malika Barreto. Subjective:     No acute surgical issues. Pt reported significant pain and nausea. Pain is incisional pain. Objective:     Visit Vitals    /89    Pulse 88    Temp 99.1 °F (37.3 °C)    Resp 16    Ht 5' 4\" (1.626 m)    Wt 331 lb 12.8 oz (150.5 kg)    SpO2 92%    BMI 56.95 kg/m2       Temp (24hrs), Av °F (37.2 °C), Min:97.6 °F (36.4 °C), Max:99.6 °F (37.6 °C)        Physical Exam:    Gen:  NAD  Pulm:  Unlabored  Abd:  S/ND/appropriate TTP  Wound:  C/D/I  Ostomy:  Patent and minimally productive    Assessment:     Hospital Problems  Date Reviewed: 2017          Codes Class Noted POA    [facility] ICD-10-CM: K63.1  ICD-9-CM: 569.83  2017 Unknown              Plan/Recommendations/Medical Decision Making:     - Perforated bowel:  Continue antibiotic  - Continue Ng tube to suction.   Clamp and DC NG tube tomorrow if doing well  - Continue PCA for breakthrough  - Local wound care   - May transfer to [facility] when bed is available    Signed By: Clara Smith MD     2017

## 2017-06-07 NOTE — ROUTINE PROCESS
0700: TRANSFER - IN REPORT:    Verbal report received from NURSE RN(name) on Romulo [place]  being received from PACU(unit) for routine post - op      Report consisted of patients Situation, Background, Assessment and   Recommendations(SBAR). Information from the following report(s) SBAR, Kardex, OR Summary, Intake/Output, MAR, Accordion, Recent Results and Cardiac Rhythm NSR was reviewed with the receiving nurse. Opportunity for questions and clarification was provided. Assessment completed upon patients arrival to unit and care assumed.

## 2017-06-07 NOTE — PROGRESS NOTES
1930: Bedside and Verbal shift change report given to Caleb Barnes RN (oncoming nurse) by Fabian Lugo RN (offgoing nurse). Report included the following information SBAR, Kardex, Intake/Output, MAR, Accordion, Recent Results and Cardiac Rhythm NSR-ST.     0730: Bedside and Verbal shift change report given to Fabian Lugo RN (oncoming nurse) by Avtar Santiago (offgoing nurse).  Report included the following information SBAR, Kardex, Intake/Output, MAR, Accordion, Recent Results and Cardiac Rhythm NSR-ST.

## 2017-06-08 LAB
ANION GAP BLD CALC-SCNC: 8 MMOL/L (ref 5–15)
BASOPHILS # BLD AUTO: 0 K/UL (ref 0–0.1)
BASOPHILS # BLD: 0 % (ref 0–1)
BUN SERPL-MCNC: 13 MG/DL (ref 6–20)
BUN/CREAT SERPL: 20 (ref 12–20)
CALCIUM SERPL-MCNC: 7.4 MG/DL (ref 8.5–10.1)
CHLORIDE SERPL-SCNC: 106 MMOL/L (ref 97–108)
CO2 SERPL-SCNC: 25 MMOL/L (ref 21–32)
CREAT SERPL-MCNC: 0.64 MG/DL (ref 0.55–1.02)
DIFFERENTIAL METHOD BLD: ABNORMAL
EOSINOPHIL # BLD: 0 K/UL (ref 0–0.4)
EOSINOPHIL NFR BLD: 0 % (ref 0–7)
ERYTHROCYTE [DISTWIDTH] IN BLOOD BY AUTOMATED COUNT: 14.8 % (ref 11.5–14.5)
GLUCOSE SERPL-MCNC: 77 MG/DL (ref 65–100)
HCT VFR BLD AUTO: 38.8 % (ref 35–47)
HGB BLD-MCNC: 12.3 G/DL (ref 11.5–16)
LYMPHOCYTES # BLD AUTO: 26 % (ref 12–49)
LYMPHOCYTES # BLD: 4.3 K/UL (ref 0.8–3.5)
MCH RBC QN AUTO: 30.9 PG (ref 26–34)
MCHC RBC AUTO-ENTMCNC: 31.7 G/DL (ref 30–36.5)
MCV RBC AUTO: 97.5 FL (ref 80–99)
MONOCYTES # BLD: 0.8 K/UL (ref 0–1)
MONOCYTES NFR BLD AUTO: 5 % (ref 5–13)
MYELOCYTES NFR BLD MANUAL: 1 %
NEUTS BAND NFR BLD MANUAL: 6 % (ref 0–6)
NEUTS SEG # BLD: 11.2 K/UL (ref 1.8–8)
NEUTS SEG NFR BLD AUTO: 62 % (ref 32–75)
PLATELET # BLD AUTO: 141 K/UL (ref 150–400)
PLATELET COMMENTS,PCOM: ABNORMAL
POTASSIUM SERPL-SCNC: 3.2 MMOL/L (ref 3.5–5.1)
RBC # BLD AUTO: 3.98 M/UL (ref 3.8–5.2)
RBC MORPH BLD: ABNORMAL
SODIUM SERPL-SCNC: 139 MMOL/L (ref 136–145)
WBC # BLD AUTO: 16.5 K/UL (ref 3.6–11)
WBC MORPH BLD: ABNORMAL

## 2017-06-08 PROCEDURE — C9113 INJ PANTOPRAZOLE SODIUM, VIA: HCPCS | Performed by: SURGERY

## 2017-06-08 PROCEDURE — 36415 COLL VENOUS BLD VENIPUNCTURE: CPT | Performed by: SURGERY

## 2017-06-08 PROCEDURE — 74011250636 HC RX REV CODE- 250/636: Performed by: NURSE PRACTITIONER

## 2017-06-08 PROCEDURE — 80048 BASIC METABOLIC PNL TOTAL CA: CPT | Performed by: SURGERY

## 2017-06-08 PROCEDURE — 74011250636 HC RX REV CODE- 250/636: Performed by: PHYSICIAN ASSISTANT

## 2017-06-08 PROCEDURE — 74011250636 HC RX REV CODE- 250/636: Performed by: SURGERY

## 2017-06-08 PROCEDURE — 74011000250 HC RX REV CODE- 250: Performed by: PHYSICIAN ASSISTANT

## 2017-06-08 PROCEDURE — 94640 AIRWAY INHALATION TREATMENT: CPT

## 2017-06-08 PROCEDURE — 85025 COMPLETE CBC W/AUTO DIFF WBC: CPT | Performed by: SURGERY

## 2017-06-08 PROCEDURE — 74011000250 HC RX REV CODE- 250: Performed by: SURGERY

## 2017-06-08 PROCEDURE — 74011000250 HC RX REV CODE- 250: Performed by: INTERNAL MEDICINE

## 2017-06-08 PROCEDURE — 74011250636 HC RX REV CODE- 250/636: Performed by: INTERNAL MEDICINE

## 2017-06-08 PROCEDURE — 65660000000 HC RM CCU STEPDOWN

## 2017-06-08 PROCEDURE — 74011000258 HC RX REV CODE- 258: Performed by: SURGERY

## 2017-06-08 RX ORDER — POTASSIUM CHLORIDE 7.45 MG/ML
10 INJECTION INTRAVENOUS
Status: COMPLETED | OUTPATIENT
Start: 2017-06-08 | End: 2017-06-08

## 2017-06-08 RX ORDER — ACETYLCYSTEINE 200 MG/ML
1 SOLUTION ORAL; RESPIRATORY (INHALATION)
Status: COMPLETED | OUTPATIENT
Start: 2017-06-08 | End: 2017-06-08

## 2017-06-08 RX ORDER — LORAZEPAM 2 MG/ML
1 INJECTION INTRAMUSCULAR
Status: DISCONTINUED | OUTPATIENT
Start: 2017-06-08 | End: 2017-06-21 | Stop reason: HOSPADM

## 2017-06-08 RX ORDER — ACETAMINOPHEN 10 MG/ML
1000 INJECTION, SOLUTION INTRAVENOUS ONCE
Status: COMPLETED | OUTPATIENT
Start: 2017-06-08 | End: 2017-06-08

## 2017-06-08 RX ADMIN — IPRATROPIUM BROMIDE AND ALBUTEROL SULFATE 3 ML: .5; 3 SOLUTION RESPIRATORY (INHALATION) at 08:54

## 2017-06-08 RX ADMIN — Medication: at 12:50

## 2017-06-08 RX ADMIN — SODIUM CHLORIDE 150 ML/HR: 900 INJECTION, SOLUTION INTRAVENOUS at 00:00

## 2017-06-08 RX ADMIN — SODIUM CHLORIDE 150 ML/HR: 900 INJECTION, SOLUTION INTRAVENOUS at 06:57

## 2017-06-08 RX ADMIN — HYDROMORPHONE HYDROCHLORIDE 1 MG: 1 INJECTION, SOLUTION INTRAMUSCULAR; INTRAVENOUS; SUBCUTANEOUS at 21:38

## 2017-06-08 RX ADMIN — HYDROMORPHONE HYDROCHLORIDE 1 MG: 1 INJECTION, SOLUTION INTRAMUSCULAR; INTRAVENOUS; SUBCUTANEOUS at 00:42

## 2017-06-08 RX ADMIN — HYDROMORPHONE HYDROCHLORIDE 1 MG: 1 INJECTION, SOLUTION INTRAMUSCULAR; INTRAVENOUS; SUBCUTANEOUS at 02:52

## 2017-06-08 RX ADMIN — ONDANSETRON 4 MG: 2 INJECTION INTRAMUSCULAR; INTRAVENOUS at 07:55

## 2017-06-08 RX ADMIN — IPRATROPIUM BROMIDE AND ALBUTEROL SULFATE 3 ML: .5; 3 SOLUTION RESPIRATORY (INHALATION) at 22:42

## 2017-06-08 RX ADMIN — Medication: at 22:58

## 2017-06-08 RX ADMIN — HYDROMORPHONE HYDROCHLORIDE 1 MG: 1 INJECTION, SOLUTION INTRAMUSCULAR; INTRAVENOUS; SUBCUTANEOUS at 13:08

## 2017-06-08 RX ADMIN — SODIUM CHLORIDE 40 MG: 9 INJECTION INTRAMUSCULAR; INTRAVENOUS; SUBCUTANEOUS at 08:05

## 2017-06-08 RX ADMIN — HYDROMORPHONE HYDROCHLORIDE 1 MG: 1 INJECTION, SOLUTION INTRAMUSCULAR; INTRAVENOUS; SUBCUTANEOUS at 04:50

## 2017-06-08 RX ADMIN — Medication: at 19:02

## 2017-06-08 RX ADMIN — POTASSIUM CHLORIDE 10 MEQ: 10 INJECTION, SOLUTION INTRAVENOUS at 08:08

## 2017-06-08 RX ADMIN — POTASSIUM CHLORIDE 10 MEQ: 10 INJECTION, SOLUTION INTRAVENOUS at 09:09

## 2017-06-08 RX ADMIN — ACETYLCYSTEINE 200 MG: 200 SOLUTION ORAL; RESPIRATORY (INHALATION) at 13:01

## 2017-06-08 RX ADMIN — ACETAMINOPHEN 1000 MG: 10 INJECTION, SOLUTION INTRAVENOUS at 02:00

## 2017-06-08 RX ADMIN — HYDROMORPHONE HYDROCHLORIDE 1 MG: 1 INJECTION, SOLUTION INTRAMUSCULAR; INTRAVENOUS; SUBCUTANEOUS at 19:04

## 2017-06-08 RX ADMIN — IPRATROPIUM BROMIDE AND ALBUTEROL SULFATE 3 ML: .5; 3 SOLUTION RESPIRATORY (INHALATION) at 16:08

## 2017-06-08 RX ADMIN — HYDROMORPHONE HYDROCHLORIDE 1 MG: 1 INJECTION, SOLUTION INTRAMUSCULAR; INTRAVENOUS; SUBCUTANEOUS at 10:55

## 2017-06-08 RX ADMIN — Medication: at 03:55

## 2017-06-08 RX ADMIN — ONDANSETRON 4 MG: 2 INJECTION INTRAMUSCULAR; INTRAVENOUS at 23:23

## 2017-06-08 RX ADMIN — HYDROMORPHONE HYDROCHLORIDE 1 MG: 1 INJECTION, SOLUTION INTRAMUSCULAR; INTRAVENOUS; SUBCUTANEOUS at 09:00

## 2017-06-08 RX ADMIN — PIPERACILLIN SODIUM,TAZOBACTAM SODIUM 3.38 G: 3; .375 INJECTION, POWDER, FOR SOLUTION INTRAVENOUS at 13:12

## 2017-06-08 RX ADMIN — ACETYLCYSTEINE 200 MG: 200 SOLUTION ORAL; RESPIRATORY (INHALATION) at 08:54

## 2017-06-08 RX ADMIN — HYDROMORPHONE HYDROCHLORIDE 1 MG: 1 INJECTION, SOLUTION INTRAMUSCULAR; INTRAVENOUS; SUBCUTANEOUS at 23:23

## 2017-06-08 RX ADMIN — PIPERACILLIN SODIUM,TAZOBACTAM SODIUM 3.38 G: 3; .375 INJECTION, POWDER, FOR SOLUTION INTRAVENOUS at 06:40

## 2017-06-08 RX ADMIN — HYDROMORPHONE HYDROCHLORIDE 1 MG: 1 INJECTION, SOLUTION INTRAMUSCULAR; INTRAVENOUS; SUBCUTANEOUS at 20:21

## 2017-06-08 RX ADMIN — SODIUM CHLORIDE 10 MG: 9 INJECTION INTRAMUSCULAR; INTRAVENOUS; SUBCUTANEOUS at 04:16

## 2017-06-08 RX ADMIN — HYDROMORPHONE HYDROCHLORIDE 1 MG: 1 INJECTION, SOLUTION INTRAMUSCULAR; INTRAVENOUS; SUBCUTANEOUS at 15:11

## 2017-06-08 RX ADMIN — ACETAMINOPHEN 1000 MG: 10 INJECTION, SOLUTION INTRAVENOUS at 13:54

## 2017-06-08 RX ADMIN — POTASSIUM CHLORIDE 10 MEQ: 10 INJECTION, SOLUTION INTRAVENOUS at 10:16

## 2017-06-08 RX ADMIN — SODIUM CHLORIDE 10 MG: 9 INJECTION INTRAMUSCULAR; INTRAVENOUS; SUBCUTANEOUS at 10:17

## 2017-06-08 RX ADMIN — LORAZEPAM 1 MG: 2 INJECTION INTRAMUSCULAR; INTRAVENOUS at 21:19

## 2017-06-08 RX ADMIN — HYDROMORPHONE HYDROCHLORIDE 1 MG: 1 INJECTION, SOLUTION INTRAMUSCULAR; INTRAVENOUS; SUBCUTANEOUS at 17:17

## 2017-06-08 RX ADMIN — SODIUM CHLORIDE 150 ML/HR: 900 INJECTION, SOLUTION INTRAVENOUS at 16:00

## 2017-06-08 RX ADMIN — IPRATROPIUM BROMIDE AND ALBUTEROL SULFATE 3 ML: .5; 3 SOLUTION RESPIRATORY (INHALATION) at 13:01

## 2017-06-08 RX ADMIN — ONDANSETRON 4 MG: 2 INJECTION INTRAMUSCULAR; INTRAVENOUS at 18:59

## 2017-06-08 RX ADMIN — HYDROMORPHONE HYDROCHLORIDE 1 MG: 1 INJECTION, SOLUTION INTRAMUSCULAR; INTRAVENOUS; SUBCUTANEOUS at 06:53

## 2017-06-08 NOTE — PROGRESS NOTES
General Surgery at [facility]    Pt reports pain control better. Has been OOB to chair. PE: /71  Pulse (!) 107  Temp 99.2 °F (37.3 °C)  Resp 20  Ht 5' 4\" (1.626 m)  Wt 340 lb 11.2 oz (154.5 kg)  LMP 05/21/2017 (Approximate)  SpO2 97%  BMI 58.48 kg/m2   Gen NAD  Abd Flat, incisional tenderness, dressing clean and dry, NG drainage is green        Hospital Problems as of 6/8/2017  Date Reviewed: 6/6/2017          Codes Class Noted - Resolved POA    [facility] ICD-10-CM: K63.1  ICD-9-CM: 569.83  6/6/2017 - Present Unknown               2 Days Post-Op s/p Procedure(s):  Diagnostic Laparoscopy, converted to open Exploratory Laparotomy, Lysis of adhesions for 7 hours/ Intraoperative Upper Endoscopy by Dr. Willy Pedro. Ileus. WBC improving. Unclear why still on Zosyn since there was no actual perforation found. Will dc Zosyn. Agree with tx to [facility].     Signed By: Jean Carlos Gloria MD     June 8, 2017

## 2017-06-08 NOTE — PROGRESS NOTES
Pulmonary, Critical Care, and Sleep Medicine~Progress Note    Name: Amelia Pedro MRN: [id number]   : 1975 Hospital: [address]   Date: 2017 8:54 AM Admission: 2017     IMPRESSION:   · S/p 17: diagnostic lap converted to converted to exploratory lap for abdominal free air with retained foreign body  · hx of crohn's disease   · Acute bronchitis, improving    · Likely has RIGOBERTO. Undx. · Hx of DVT  · Chronic pain       PLAN:   · On zosyn   · SCDs/ Protonix   · Sputum cxs pending  · Replete K  · Pain control   · Duonebs, few doses of mucomyst  · Advance diet per surgery/GI  · Discussed with RN  · Has orders to [facility]     Daily Progression:    :  Chest x-ray yesterday was fairly clear. Congestion better. Pain better controled. No dyspnea. :  + pain, + extensive congestion.      OBJECTIVE:     Vital Signs:       Visit Vitals    BP (!) 123/103    Pulse (!) 107    Temp 99.4 °F (37.4 °C)    Resp 20    Ht 5' 4\" (1.626 m)    Wt 154.5 kg (340 lb 11.2 oz)    LMP 2017 (Approximate)    SpO2 94%    BMI 58.48 kg/m2      Temp (24hrs), Av.6 °F (37.6 °C), Min:99.1 °F (37.3 °C), Max:100.3 °F (37.9 °C)     Intake/Output:     Last shift:      Last 3 shifts:  1901 -  0700  In: 6686.3 [I.V.:6686.3]  Out: 5043 [Urine:2315; Drains:420]          Intake/Output Summary (Last 24 hours) at 17 0757  Last data filed at 17 0700   Gross per 24 hour   Intake          3936.25 ml   Output             2035 ml   Net          1901.25 ml       Physical Exam:                                        Exam Findings Other   General: No resp distress noted, appears stated age    HEENT:  No ulcers, JVD not elevated, no cervical LAD    Chest: No pectus deformity, normal chest rise b/l    HEART:  RRR, no murmurs/rubs/gallops    Lungs:  Mostly clear     ABD: Abdominal pains as expected     EXT: No cyanosis/clubbing/edema, normal peripheral pulses    Skin: No rashes or ulcers, no mottling    Neuro: A/O x 3        Medications:  Current Facility-Administered Medications   Medication Dose Route Frequency    HYDROmorphone (PF) 15 mg/30 ml (DILAUDID) PCA   IntraVENous CONTINUOUS    HYDROmorphone (PF) (DILAUDID) injection 1 mg  1 mg IntraVENous Q2H PRN    ondansetron (ZOFRAN) injection 4 mg  4 mg IntraVENous Q4H PRN    piperacillin-tazobactam (ZOSYN) 3.375 g in 0.9% sodium chloride (MBP/ADV) 100 mL  3.375 g IntraVENous Q8H    albuterol (PROVENTIL VENTOLIN) nebulizer solution 2.5 mg  2.5 mg Nebulization Q4H PRN    pantoprazole (PROTONIX) 40 mg in sodium chloride 0.9 % 10 mL injection  40 mg IntraVENous Q24H    albuterol-ipratropium (DUO-NEB) 2.5 MG-0.5 MG/3 ML  3 mL Nebulization QID RT    0.9% sodium chloride infusion  150 mL/hr IntraVENous CONTINUOUS    prochlorperazine (COMPAZINE) with saline injection 10 mg  10 mg IntraVENous Q6H PRN    chlorproMAZINE (THORAZINE) 25 mg in 0.9% sodium chloride 50 mL ivpb  25 mg IntraVENous Q4H PRN       Labs:  ABG No results for input(s): PHI, PCO2I, PO2I, HCO3I, SO2I, FIO2I in the last 72 hours.      CBC Recent Labs      06/08/17   0449  06/07/17   0234   WBC  16.5*  23.6*   HGB  12.3  14.5   HCT  38.8  44.7   PLT  141*  164   MCV  97.5  97.0   MCH  30.9  87.2        Metabolic  Panel Recent Labs      06/08/17   0449  06/07/17   0234   NA  139  140   K  3.2*  4.0   CL  106  104   CO2  25  24   GLU  77  151*   BUN  13  17   CREA  0.64  0.96   CA  7.4*  8.5   ALB   --   2.7*   SGOT   --   27   ALT   --   33        Pertinent Labs                Hemant Gordon, [place]  6/8/2017

## 2017-06-08 NOTE — PROGRESS NOTES
Attended interdisciplinary rounds in ICU. : Rev. Magy Pelaez.  Adalgisa Rodriguez; [facility]; to contact [phone] Dino Jernigan call: [phone]

## 2017-06-08 NOTE — PROGRESS NOTES
[address], [address] [place] [address]    GI PROGRESS NOTE    NAME: Teresa Vale   :  1975   MRN:  [id number]       Subjective:     Complaining of nausea and abdominal pain. Addition of compazine helping. Objective:     VITALS:   Last 24hrs VS reviewed since prior progress note. Most recent are:  Visit Vitals    /62    Pulse (!) 110    Temp 99.1 °F (37.3 °C)    Resp 18    Ht 5' 4\" (1.626 m)    Wt 154.5 kg (340 lb 11.2 oz)    SpO2 100%    BMI 58.48 kg/m2       Intake/Output Summary (Last 24 hours) at 17 1010  Last data filed at 17 0808   Gross per 24 hour   Intake          3933.75 ml   Output             1720 ml   Net          2213.75 ml     PHYSICAL EXAM:  General: WD, WN. Alert, cooperative, no acute distress    HEENT: NC, NGT with bilious output to LWS  Lungs:  Scattered rhonchi throughout, productive cough. Diminished in bases but likely body habitus  Heart:  ST 110s, no murmur  Abdomen: Soft, + bowel sounds, TTP   Neurologic:  Alert and oriented X 3. No acute neurological distress   Psych:   Good insight. Not anxious or agitated. Lab Data Reviewed:   Recent Labs      179  17   0234   WBC  16.5*  23.6*   HGB  12.3  14.5   HCT  38.8  44.7   PLT  141*  164     Recent Labs      17   0449  17   0234   NA  139  140   K  3.2*  4.0   CL  106  104   CO2  25  24   BUN  13  17   CREA  0.64  0.96   GLU  77  151*   CA  7.4*  8.5     Recent Labs      17   0234   SGOT  27   AP  56   TP  5.9*   ALB  2.7*   GLOB  3.2       ________________________________________________________________________       Assessment:   · Crohn's disease with complex surgical history including multiple small bowel resections, colectomy with end ileostomy and rectal stump  · Retained capsule endoscopy (not identified intraoperatively and on abdominal plain film suggesting that this must have passed through ileostomy).   · Partial thickness small bowel perforation vs microperforation - oversewn intraoperatively  · Extensive adhesion lysis intraoperatively  · Small bowel mucosal ulceration seen endoscopically consistent with active Crohn's disease. Plan:   · Continue anti-emetics. · Continue supportive post-operative care  · Would defer steroid treatment at this time. Her outpatient dose was 20 mg daily as part of a long taper  · Will hold off on restarting Crohn's medications at this time. This will need to be done as an outpatient following recovery from surgery  · Appreciate Surgery and ICU team's care  · Please call with questions     Signed By: Brandon Bowling. Belinda Guido NP     6/8/2017  10:52 AM           GI Attending: Patient seen and examined. Agree with above. Appreciate Surgery and ICU team's care. Please call with questions. Cain Hurt MD

## 2017-06-08 NOTE — PROGRESS NOTES
Shift summary:  Patient alert and oriented x4. NSR on the monitor. Productive cough on RA. Needs encouragement with Incentive Spirometer. Midline abdominal incision with dry dressing c/d/i, CHRIS draining minimal serosanguinous. Post op abdominal pain reported despite Dilaudid PCA and Dilaudid PRN, Surgery NP aware orders received. NGT with 250 green output this shift. LLQ Ileostomy- minimal thick brown output, stoma red not passing flatus at this time. Patient OOB with 1-2 assist, tolerated chair for 2hrs today and BSC x3.

## 2017-06-08 NOTE — PROGRESS NOTES
Spiritual Care Assessment/Progress Notes    Kendy Whittington [id number]  [id number]    1975  46 y.o.  female    Patient Telephone Number: [phone] (home)   Roman Catholic Affiliation: Non Mandaen   Language: English   Extended Emergency Contact Information  Primary Emergency Contact: Nicolás Reyes  Address: [address], [address] [place] [address] Phone: [phone]  Relation: Parent   Patient Active Problem List    Diagnosis Date Noted    Perforated bowel ([facility] 06/06/2017    Right flank pain 08/22/2011    Crohn's disease ([facility] 08/15/2011    DVT (deep venous thrombosis) ([facility] 08/15/2011    Incarcerated ventral hernia 08/15/2011        Date: 6/8/2017       Level of Roman Catholic/Spiritual Activity:  [x]         Involved in jalen tradition/spiritual practice    []         Not involved in jalen tradition/spiritual practice  [x]         Spiritually oriented    []         Claims no spiritual orientation    []         seeking spiritual identity  []         Feels alienated from Orthodoxy practice/tradition  []         Feels angry about Orthodoxy practice/tradition  [x]         Spirituality/Orthodoxy tradition IS a resource for coping at this time.   []         Not able to assess due to medical condition    Services Provided Today:  []         crisis intervention    []         reading Scriptures  [x]         spiritual assessment    [x]         prayer  [x]         empathic listening/emotional support  []         rites and rituals (cite in comments)  []         life review     []         Orthodoxy support  []         theological development   []         advocacy  []         ethical dialog     []         blessing  []         bereavement support    []         support to family  []         anticipatory grief support   []         help with AMD  []         spiritual guidance    []         meditation      Spiritual Care Needs  [x]         Emotional Support  [] Spiritual/Adventism Care  []         Loss/Adjustment  []         Advocacy/Referral                /Ethics  []         No needs expressed at               this time  []         Other: (note in               comments)  [address]  []         Follow up visits with               pt/family  []         Provide materials  []         Schedule sacraments  []         Contact Community               Clergy  [x]         Follow up as needed  []         Other: (note in               comments)     Comments: Saw Ms Wade Gallo in [address] [place] [address] for initial spiritual assessment. Ms Wade Gallo was lying quietly in bed and looked somewhat pensive. Provided active listening as she processed her feelings about having had surgery and the recovery needs that lay ahead of her. She shared that she was trying to be very intentional about doing what was needed to help her body recover. When asked about her current needs/concerns she requested that  have prayer for her. Had prayer as requested and assured her of continued prayers on her behalf. Ms Wade Gallo stated that she was a member of [facility]; said that her jalen community was aware of her health issues and that her  had been visiting whenever he could. Informed her of 24-hour  availability for support as need. Plan: Chaplains will continue to be available for patient/family support as needed/able. : . Raffy Montano.  Georges Mendiola; [facility], to contact [phone] [address] call: [phone]

## 2017-06-08 NOTE — ROUTINE PROCESS
TRANSFER - OUT REPORT:    Verbal report given to MAURI Aleman (name) on Fernanda Burr  being transferred to [facility] (unit) for routine progression of care       Report consisted of patients Situation, Background, Assessment and   Recommendations(SBAR). Information from the following report(s) SBAR, Kardex, OR Summary, Intake/Output, MAR and Recent Results was reviewed with the receiving nurse. Lines:   Quad Lumen 06/06/17 Right Internal jugular (Active)   Central Line Being Utilized Yes 6/8/2017  4:00 PM   Criteria for Appropriate Use Limited/no vessel suitable for conventional peripheral access 6/8/2017  4:00 PM   Site Assessment Clean, dry, & intact 6/8/2017  4:00 PM   Infiltration Assessment 0 6/8/2017  4:00 PM   Affected Extremity/Extremities Color distal to insertion site pink (or appropriate for race) 6/8/2017  4:00 PM   Date of Last Dressing Change 06/06/17 6/8/2017  4:00 PM   Dressing Status Clean, dry, & intact 6/8/2017  4:00 PM   Dressing Type Disk with Chlorhexadine gluconate (CHG); Transparent 6/8/2017  4:00 PM   Action Taken Open ports on tubing capped 6/8/2017  4:00 PM   Proximal Hub Color/Line Status White; Infusing 6/8/2017  4:00 PM   Positive Blood Return (Medial Site) Yes 6/8/2017  8:00 AM   Medial 1 Hub Color/Line Status Felipe Arreguin; Infusing 6/8/2017  4:00 PM   Positive Blood Return (Lateral Site) Yes 6/8/2017  8:00 AM   Medial 2 Hub Color/Line Status Blue;Capped 6/8/2017  4:00 PM   Positive Blood Return (Site #3) Yes 6/8/2017  8:00 AM   Distal Hub Color/Line Status Brown;Capped 6/8/2017  4:00 PM   Positive Blood Return (Site #4) Yes 6/8/2017  8:00 AM   Alcohol Cap Used Yes 6/8/2017  4:00 PM       Peripheral IV 06/06/17 Right Hand (Active)   Site Assessment Clean, dry, & intact 6/8/2017  4:00 PM   Phlebitis Assessment 0 6/8/2017  4:00 PM   Infiltration Assessment 0 6/8/2017  4:00 PM   Dressing Status Clean, dry, & intact 6/8/2017  4:00 PM   Dressing Type Transparent;Tape 6/8/2017  4:00 PM   Hub Color/Line Status Pink;Capped 6/8/2017  4:00 PM   Action Taken Open ports on tubing capped 6/8/2017  4:00 PM   Alcohol Cap Used Yes 6/8/2017  4:00 PM        Opportunity for questions and clarification was provided.       Patient transported with:   Monitor  Registered Nurse  Tech

## 2017-06-09 LAB
ANION GAP BLD CALC-SCNC: 9 MMOL/L (ref 5–15)
BASOPHILS # BLD AUTO: 0 K/UL (ref 0–0.1)
BASOPHILS # BLD: 0 % (ref 0–1)
BUN SERPL-MCNC: 6 MG/DL (ref 6–20)
BUN/CREAT SERPL: 12 (ref 12–20)
CALCIUM SERPL-MCNC: 7.8 MG/DL (ref 8.5–10.1)
CHLORIDE SERPL-SCNC: 106 MMOL/L (ref 97–108)
CO2 SERPL-SCNC: 25 MMOL/L (ref 21–32)
CREAT SERPL-MCNC: 0.51 MG/DL (ref 0.55–1.02)
EOSINOPHIL # BLD: 0.1 K/UL (ref 0–0.4)
EOSINOPHIL NFR BLD: 1 % (ref 0–7)
ERYTHROCYTE [DISTWIDTH] IN BLOOD BY AUTOMATED COUNT: 14.4 % (ref 11.5–14.5)
GLUCOSE SERPL-MCNC: 66 MG/DL (ref 65–100)
HCT VFR BLD AUTO: 36.1 % (ref 35–47)
HGB BLD-MCNC: 11.4 G/DL (ref 11.5–16)
LYMPHOCYTES # BLD AUTO: 37 % (ref 12–49)
LYMPHOCYTES # BLD: 6 K/UL (ref 0.8–3.5)
MCH RBC QN AUTO: 30.4 PG (ref 26–34)
MCHC RBC AUTO-ENTMCNC: 31.6 G/DL (ref 30–36.5)
MCV RBC AUTO: 96.3 FL (ref 80–99)
MONOCYTES # BLD: 0.7 K/UL (ref 0–1)
MONOCYTES NFR BLD AUTO: 5 % (ref 5–13)
NEUTS SEG # BLD: 9.4 K/UL (ref 1.8–8)
NEUTS SEG NFR BLD AUTO: 57 % (ref 32–75)
PLATELET # BLD AUTO: 153 K/UL (ref 150–400)
POTASSIUM SERPL-SCNC: 3.2 MMOL/L (ref 3.5–5.1)
RBC # BLD AUTO: 3.75 M/UL (ref 3.8–5.2)
SODIUM SERPL-SCNC: 140 MMOL/L (ref 136–145)
WBC # BLD AUTO: 16.2 K/UL (ref 3.6–11)

## 2017-06-09 PROCEDURE — 36415 COLL VENOUS BLD VENIPUNCTURE: CPT | Performed by: PHYSICIAN ASSISTANT

## 2017-06-09 PROCEDURE — 74011250636 HC RX REV CODE- 250/636: Performed by: SURGERY

## 2017-06-09 PROCEDURE — C9113 INJ PANTOPRAZOLE SODIUM, VIA: HCPCS | Performed by: SURGERY

## 2017-06-09 PROCEDURE — 74011000250 HC RX REV CODE- 250: Performed by: PHYSICIAN ASSISTANT

## 2017-06-09 PROCEDURE — 65660000000 HC RM CCU STEPDOWN

## 2017-06-09 PROCEDURE — 94640 AIRWAY INHALATION TREATMENT: CPT

## 2017-06-09 PROCEDURE — 80048 BASIC METABOLIC PNL TOTAL CA: CPT | Performed by: PHYSICIAN ASSISTANT

## 2017-06-09 PROCEDURE — 85025 COMPLETE CBC W/AUTO DIFF WBC: CPT | Performed by: PHYSICIAN ASSISTANT

## 2017-06-09 PROCEDURE — 74011250636 HC RX REV CODE- 250/636: Performed by: NURSE PRACTITIONER

## 2017-06-09 PROCEDURE — 74011000250 HC RX REV CODE- 250: Performed by: SURGERY

## 2017-06-09 RX ORDER — PANTOPRAZOLE SODIUM 40 MG/1
40 TABLET, DELAYED RELEASE ORAL
Status: DISCONTINUED | OUTPATIENT
Start: 2017-06-10 | End: 2017-06-21 | Stop reason: HOSPADM

## 2017-06-09 RX ORDER — ENOXAPARIN SODIUM 100 MG/ML
40 INJECTION SUBCUTANEOUS DAILY
Status: DISCONTINUED | OUTPATIENT
Start: 2017-06-09 | End: 2017-06-21 | Stop reason: HOSPADM

## 2017-06-09 RX ORDER — IPRATROPIUM BROMIDE AND ALBUTEROL SULFATE 2.5; .5 MG/3ML; MG/3ML
3 SOLUTION RESPIRATORY (INHALATION)
Status: DISCONTINUED | OUTPATIENT
Start: 2017-06-09 | End: 2017-06-10

## 2017-06-09 RX ORDER — SODIUM CHLORIDE 0.9 % (FLUSH) 0.9 %
SYRINGE (ML) INJECTION
Status: COMPLETED
Start: 2017-06-09 | End: 2017-06-09

## 2017-06-09 RX ADMIN — IPRATROPIUM BROMIDE AND ALBUTEROL SULFATE 3 ML: .5; 3 SOLUTION RESPIRATORY (INHALATION) at 13:51

## 2017-06-09 RX ADMIN — SODIUM CHLORIDE 40 MG: 9 INJECTION INTRAMUSCULAR; INTRAVENOUS; SUBCUTANEOUS at 09:08

## 2017-06-09 RX ADMIN — HYDROMORPHONE HYDROCHLORIDE 1 MG: 1 INJECTION, SOLUTION INTRAMUSCULAR; INTRAVENOUS; SUBCUTANEOUS at 07:05

## 2017-06-09 RX ADMIN — Medication: at 18:22

## 2017-06-09 RX ADMIN — LORAZEPAM 1 MG: 2 INJECTION INTRAMUSCULAR; INTRAVENOUS at 12:24

## 2017-06-09 RX ADMIN — HYDROMORPHONE HYDROCHLORIDE 1 MG: 1 INJECTION, SOLUTION INTRAMUSCULAR; INTRAVENOUS; SUBCUTANEOUS at 09:08

## 2017-06-09 RX ADMIN — ONDANSETRON 4 MG: 2 INJECTION INTRAMUSCULAR; INTRAVENOUS at 05:09

## 2017-06-09 RX ADMIN — HYDROMORPHONE HYDROCHLORIDE 1 MG: 1 INJECTION, SOLUTION INTRAMUSCULAR; INTRAVENOUS; SUBCUTANEOUS at 03:09

## 2017-06-09 RX ADMIN — ONDANSETRON 4 MG: 2 INJECTION INTRAMUSCULAR; INTRAVENOUS at 10:07

## 2017-06-09 RX ADMIN — HYDROMORPHONE HYDROCHLORIDE 1 MG: 1 INJECTION, SOLUTION INTRAMUSCULAR; INTRAVENOUS; SUBCUTANEOUS at 05:09

## 2017-06-09 RX ADMIN — ONDANSETRON 4 MG: 2 INJECTION INTRAMUSCULAR; INTRAVENOUS at 14:06

## 2017-06-09 RX ADMIN — HYDROMORPHONE HYDROCHLORIDE 1 MG: 1 INJECTION, SOLUTION INTRAMUSCULAR; INTRAVENOUS; SUBCUTANEOUS at 00:47

## 2017-06-09 RX ADMIN — IPRATROPIUM BROMIDE AND ALBUTEROL SULFATE 3 ML: .5; 3 SOLUTION RESPIRATORY (INHALATION) at 08:42

## 2017-06-09 RX ADMIN — SODIUM CHLORIDE 150 ML/HR: 900 INJECTION, SOLUTION INTRAVENOUS at 09:08

## 2017-06-09 RX ADMIN — Medication: at 19:57

## 2017-06-09 RX ADMIN — IPRATROPIUM BROMIDE AND ALBUTEROL SULFATE 3 ML: .5; 3 SOLUTION RESPIRATORY (INHALATION) at 19:38

## 2017-06-09 RX ADMIN — Medication: at 04:26

## 2017-06-09 RX ADMIN — ENOXAPARIN SODIUM 40 MG: 40 INJECTION SUBCUTANEOUS at 10:14

## 2017-06-09 RX ADMIN — LORAZEPAM 1 MG: 2 INJECTION INTRAMUSCULAR; INTRAVENOUS at 18:23

## 2017-06-09 RX ADMIN — Medication: at 18:30

## 2017-06-09 RX ADMIN — Medication: at 11:38

## 2017-06-09 RX ADMIN — ONDANSETRON 4 MG: 2 INJECTION INTRAMUSCULAR; INTRAVENOUS at 20:47

## 2017-06-09 RX ADMIN — LORAZEPAM 1 MG: 2 INJECTION INTRAMUSCULAR; INTRAVENOUS at 07:19

## 2017-06-09 NOTE — PROGRESS NOTES
[address]  [address], [address]    GI PROGRESS NOTE    NAME: Cristina Gibbs   :  1975   MRN:  [id number]       Subjective:     Feels much better. NG tube out. Tolerated some clear liquids today. Objective:     VITALS:   Last 24hrs VS reviewed since prior progress note. Most recent are:  Visit Vitals    BP (!) 150/91 (BP 1 Location: Left arm, BP Patient Position: Post activity)    Pulse (!) 118    Temp 98.3 °F (36.8 °C)    Resp 20    Ht 5' 4\" (1.626 m)    Wt 151.2 kg (333 lb 5.4 oz)    SpO2 97%    BMI 57.22 kg/m2       Intake/Output Summary (Last 24 hours) at 17 1313  Last data filed at 17 0855   Gross per 24 hour   Intake             2325 ml   Output             2264 ml   Net               61 ml     PHYSICAL EXAM:  General: WD, WN. Alert, cooperative, no acute distress    Abdomen: Soft, + bowel sounds, TTP   Neurologic:  Alert and oriented X 3. No acute neurological distress   Psych:   Good insight. Not anxious or agitated. Lab Data Reviewed:   Recent Labs      17   0240  17   0449   WBC  16.2*  16.5*   HGB  11.4*  12.3   HCT  36.1  38.8   PLT  153  141*     Recent Labs      17   0240  17   0449   NA  140  139   K  3.2*  3.2*   CL  106  106   CO2  25  25   BUN  6  13   CREA  0.51*  0.64   GLU  66  77   CA  7.8*  7.4*     Recent Labs      17   0234   SGOT  27   AP  56   TP  5.9*   ALB  2.7*   GLOB  3.2       ________________________________________________________________________       Assessment:   · Crohn's disease with complex surgical history including multiple small bowel resections, colectomy with end ileostomy and rectal stump  · Retained capsule endoscopy (not identified intraoperatively and on abdominal plain film suggesting that this must have passed through ileostomy).   · Partial thickness small bowel perforation vs microperforation - oversewn intraoperatively  · Extensive adhesion lysis intraoperatively  · Small bowel mucosal ulceration seen endoscopically consistent with active Crohn's disease. · Tolerating clear liquids     Plan:   · Diet per Surgery team  · Continue anti-emetics. · Continue supportive post-operative care  · Would defer steroid treatment at this time. Her outpatient dose was 20 mg daily as part of a long taper  · Will hold off on restarting Crohn's medications at this time. This will need to be done as an outpatient following recovery from surgery  · Appreciate Surgery and ICU team's care  · Please call with questions. Weekend team to follow. Cain Kimbrough MD

## 2017-06-09 NOTE — PROGRESS NOTES
Bedside shift change report given to Andrew Chavez RN (oncoming nurse) by MAURI Aleman (offgoing nurse). Report included the following information SBAR, Kardex, Intake/Output, MAR, Accordion and Cardiac Rhythm Sinus Tach.

## 2017-06-09 NOTE — PROGRESS NOTES
Bedside and Verbal shift change report given to Maria Fernanda Byrd (oncoming nurse) by Mare Mauricio (offgoing nurse). Report included the following information Kardex and Cardiac Rhythm Sinus Tach.

## 2017-06-09 NOTE — PROGRESS NOTES
Ms. Narinder Jose is feeling a little better today. Tm 100.3 Tc 98.3 HR: 106 BP: 129/83 Resp Rate: 16 per minute 97% sat on room air. Intake/Output Summary (Last 24 hours) at 06/09/17 0923  Last data filed at 06/09/17 0855   Gross per 24 hour   Intake             3025 ml   Output             2464 ml   Net              561 ml   Exam: Cor: RRR. Lungs: Bilat BS. Coarse BS bilaterally. Abd: Soft, Tender. No rebound or guarding. Ileostomy working. Midline incision is clean. Serosanguinous drain output. Labs:   Recent Results (from the past 12 hour(s))   CBC WITH AUTOMATED DIFF    Collection Time: 06/09/17  2:40 AM   Result Value Ref Range    WBC 16.2 (H) 3.6 - 11.0 K/uL    RBC 3.75 (L) 3.80 - 5.20 M/uL    HGB 11.4 (L) 11.5 - 16.0 g/dL    HCT 36.1 35.0 - 47.0 %    MCV 96.3 80.0 - 99.0 FL    MCH 30.4 26.0 - 34.0 PG    MCHC 31.6 30.0 - 36.5 g/dL    RDW 14.4 11.5 - 14.5 %    PLATELET 770 705 - 311 K/uL    NEUTROPHILS 57 32 - 75 %    LYMPHOCYTES 37 12 - 49 %    MONOCYTES 5 5 - 13 %    EOSINOPHILS 1 0 - 7 %    BASOPHILS 0 0 - 1 %    ABS. NEUTROPHILS 9.4 (H) 1.8 - 8.0 K/UL    ABS. LYMPHOCYTES 6.0 (H) 0.8 - 3.5 K/UL    ABS. MONOCYTES 0.7 0.0 - 1.0 K/UL    ABS. EOSINOPHILS 0.1 0.0 - 0.4 K/UL    ABS. BASOPHILS 0.0 0.0 - 0.1 K/UL   METABOLIC PANEL, BASIC    Collection Time: 06/09/17  2:40 AM   Result Value Ref Range    Sodium 140 136 - 145 mmol/L    Potassium 3.2 (L) 3.5 - 5.1 mmol/L    Chloride 106 97 - 108 mmol/L    CO2 25 21 - 32 mmol/L    Anion gap 9 5 - 15 mmol/L    Glucose 66 65 - 100 mg/dL    BUN 6 6 - 20 MG/DL    Creatinine 0.51 (L) 0.55 - 1.02 MG/DL    BUN/Creatinine ratio 12 12 - 20      GFR est AA >60 >60 ml/min/1.73m2    GFR est non-AA >60 >60 ml/min/1.73m2    Calcium 7.8 (L) 8.5 - 10.1 MG/DL   Will remove NG tube. Start clear liquid diet. Decrease IVF to 100cc/hour. Dilaudid PCA for now. Anti-emetics as needed. Needs to be OOB.   DVT Prophylaxis - Lovenox 40mg SC daily. GI following.

## 2017-06-09 NOTE — PROGRESS NOTES
Pulmonary, Critical Care, and Sleep Medicine~Progress Note    Name: Genora Lesch MRN: [id number]   : 1975 [facility]   Date: 2017 8:54 AM Admission: 2017     IMPRESSION:   · S/p 17: diagnostic lap converted to converted to exploratory lap for abdominal free air with retained foreign body  · hx of crohn's disease   · Acute bronchitis, improving    · Likely has RIGOBERTO. Undx. · Hx of DVT  · Chronic pain       PLAN:   · SCDs/ Protonix   · Pain control   · Duonebs, few doses of mucomyst  · Advance diet per surgery/GI  · Would benefit from a sleep study once discharged   · We will be available again to see if needed      Daily Progression:    :   Sounds so much better today     :  Chest x-ray yesterday was fairly clear. Congestion better. Pain better controled. No dyspnea. :  + pain, + extensive congestion.      OBJECTIVE:     Vital Signs:       Visit Vitals    /83    Pulse (!) 106    Temp 98.3 °F (36.8 °C)    Resp 16    Ht 5' 4\" (1.626 m)    Wt 151.2 kg (333 lb 5.4 oz)    LMP 2017 (Approximate)    SpO2 97%    BMI 57.22 kg/m2      Temp (24hrs), Av.1 °F (37.3 °C), Min:98.3 °F (36.8 °C), Max:99.5 °F (37.5 °C)     Intake/Output:     Last shift:  07 -  1900  In: 10 [P.O.:10]  Out: 435 [Drains:10]    Last 3 shifts:  190 -  0700  In: 6115 [P.O.:80; I.V.:5975]  Out: 3463 [Urine:2355; Drains:144]          Intake/Output Summary (Last 24 hours) at 17 1012  Last data filed at 17 0855   Gross per 24 hour   Intake             2875 ml   Output             2464 ml   Net              411 ml       Physical Exam:                                        Exam Findings Other   General: No resp distress noted, appears stated age    HEENT:  No ulcers, JVD not elevated, no cervical LAD    Chest: No pectus deformity, normal chest rise b/l    HEART:  RRR, no murmurs/rubs/gallops    Lungs:  Mostly clear     ABD: Abdominal pains as expected     EXT: No cyanosis/clubbing/edema, normal peripheral pulses    Skin: No rashes or ulcers, no mottling    Neuro: A/O x 3        Medications:  Current Facility-Administered Medications   Medication Dose Route Frequency    [START ON 6/10/2017] pantoprazole (PROTONIX) tablet 40 mg  40 mg Oral ACB    enoxaparin (LOVENOX) injection 40 mg  40 mg SubCUTAneous DAILY    albuterol-ipratropium (DUO-NEB) 2.5 MG-0.5 MG/3 ML  3 mL Nebulization TID RT    LORazepam (ATIVAN) injection 1 mg  1 mg IntraVENous Q6H PRN    HYDROmorphone (PF) 15 mg/30 ml (DILAUDID) PCA   IntraVENous CONTINUOUS    ondansetron (ZOFRAN) injection 4 mg  4 mg IntraVENous Q4H PRN    albuterol (PROVENTIL VENTOLIN) nebulizer solution 2.5 mg  2.5 mg Nebulization Q4H PRN    0.9% sodium chloride infusion  100 mL/hr IntraVENous CONTINUOUS    prochlorperazine (COMPAZINE) with saline injection 10 mg  10 mg IntraVENous Q6H PRN    chlorproMAZINE (THORAZINE) 25 mg in 0.9% sodium chloride 50 mL ivpb  25 mg IntraVENous Q4H PRN       Labs:  ABG No results for input(s): PHI, PCO2I, PO2I, HCO3I, SO2I, FIO2I in the last 72 hours.      CBC Recent Labs      06/09/17   0240  06/08/17   0449  06/07/17   0234   WBC  16.2*  16.5*  23.6*   HGB  11.4*  12.3  14.5   HCT  36.1  38.8  44.7   PLT  153  141*  164   MCV  96.3  97.5  97.0   MCH  30.4  30.9  24.3        Metabolic  Panel Recent Labs      06/09/17   0240  06/08/17   0449  06/07/17   0234   NA  140  139  140   K  3.2*  3.2*  4.0   CL  106  106  104   CO2  25  25  24   GLU  66  77  151*   BUN  6  13  17   CREA  0.51*  0.64  0.96   CA  7.8*  7.4*  8.5   ALB   --    --   2.7*   SGOT   --    --   27   ALT   --    --   33        Pertinent Labs                АНДРЕЙ Miller  6/9/2017

## 2017-06-09 NOTE — PROGRESS NOTES
06/09/17 1111   Vitals   Temp 98.3 °F (36.8 °C)   Temp Source Oral   Pulse (Heart Rate) (!) 118   Heart Rate Source Monitor   Resp Rate 20   O2 Sat (%) 97 %   Level of Consciousness Alert   BP (!) 150/91   MAP (Calculated) 111   BP 1 Location Left arm   BP 1 Method Automatic   BP Patient Position Post activity   Cardiac Rhythm Sinus Tach   MEWS Score 3   Patient Observation   Observations pt up to the bathroom     Pt is walking getting out of bed, BP elevated due to pain and movement. Patient has been running tachycardic; will continue to monitor.

## 2017-06-09 NOTE — PROGRESS NOTES
Primary Nurse Obey Vera RN and MAURI Aleman performed a dual skin assessment on this patient Impairment noted- see wound doc flow sheet  Amador score is 16

## 2017-06-09 NOTE — PROGRESS NOTES
ADULT PROTOCOL: JET AEROSOL ASSESSMENT    Patient  Shirley Mcadams     46 y.o.   female     6/9/2017  9:47 AM    Breath Sounds Pre Procedure: Right Breath Sounds: Coarse                               Left Breath Sounds: Coarse    Breath Sounds Post Procedure: Right Breath Sounds: Clear, Diminished                                 Left Breath Sounds: Clear, Diminished    Breathing pattern: Pre procedure Breathing Pattern: Regular          Post procedure Breathing Pattern: Regular    Heart Rate: Pre procedure Pulse: 100           Post procedure Pulse: 104    Resp Rate: Pre procedure Respirations: 18           Post procedure Respirations: 19    Peak Flow: Pre bronchodilator             Post bronchodilator       FVC/FEV1:      Incentive Spirometry:  Actual Volume (ml): 1250 ml          Cough: Pre procedure Cough: Productive, Congested               Post procedure      Suctioned: NO    Sputum: Pre procedure Sputum amount: Scant  Sputum color/odor: Yellow  Sputum consistency: Thick  Sputum method obtained: Cough on request                 Post procedure      Oxygen: O2 Device: Room air   FiO2 (%) . 21     Changed: NO    SpO2: Pre procedure SpO2: 97 %   without oxygen              Post procedure SpO2: 93 %  without oxygen    Nebulizer Therapy: Current medications Aerosolized Medications: DuoNeb      Changed: NO    Smoking History:     Problem List:   Patient Active Problem List   Diagnosis Code    Crohn's disease ([facility] K50.90    DVT (deep venous thrombosis) ([facility] I82.409    Incarcerated ventral hernia K46.0    Right flank pain R10.9    Perforated bowel ([facility] K63.1       Respiratory Therapist: Naldo Thompson RT

## 2017-06-10 LAB
ANION GAP BLD CALC-SCNC: 11 MMOL/L (ref 5–15)
BACTERIA SPEC CULT: ABNORMAL
BUN SERPL-MCNC: 3 MG/DL (ref 6–20)
BUN/CREAT SERPL: 6 (ref 12–20)
CALCIUM SERPL-MCNC: 7.7 MG/DL (ref 8.5–10.1)
CHLORIDE SERPL-SCNC: 104 MMOL/L (ref 97–108)
CO2 SERPL-SCNC: 22 MMOL/L (ref 21–32)
CREAT SERPL-MCNC: 0.52 MG/DL (ref 0.55–1.02)
GLUCOSE SERPL-MCNC: 57 MG/DL (ref 65–100)
GRAM STN SPEC: ABNORMAL
POTASSIUM SERPL-SCNC: 2.9 MMOL/L (ref 3.5–5.1)
SERVICE CMNT-IMP: ABNORMAL
SODIUM SERPL-SCNC: 137 MMOL/L (ref 136–145)

## 2017-06-10 PROCEDURE — 36415 COLL VENOUS BLD VENIPUNCTURE: CPT | Performed by: SURGERY

## 2017-06-10 PROCEDURE — 74011250637 HC RX REV CODE- 250/637: Performed by: SURGERY

## 2017-06-10 PROCEDURE — 74011250636 HC RX REV CODE- 250/636: Performed by: NURSE PRACTITIONER

## 2017-06-10 PROCEDURE — 65660000000 HC RM CCU STEPDOWN

## 2017-06-10 PROCEDURE — 74011250636 HC RX REV CODE- 250/636: Performed by: SURGERY

## 2017-06-10 PROCEDURE — 80048 BASIC METABOLIC PNL TOTAL CA: CPT | Performed by: SURGERY

## 2017-06-10 PROCEDURE — 94640 AIRWAY INHALATION TREATMENT: CPT

## 2017-06-10 PROCEDURE — 74011000250 HC RX REV CODE- 250: Performed by: SURGERY

## 2017-06-10 PROCEDURE — 74011250636 HC RX REV CODE- 250/636: Performed by: INTERNAL MEDICINE

## 2017-06-10 PROCEDURE — 74011000250 HC RX REV CODE- 250: Performed by: INTERNAL MEDICINE

## 2017-06-10 RX ORDER — POTASSIUM CHLORIDE 7.45 MG/ML
10 INJECTION INTRAVENOUS
Status: COMPLETED | OUTPATIENT
Start: 2017-06-10 | End: 2017-06-14

## 2017-06-10 RX ORDER — IPRATROPIUM BROMIDE AND ALBUTEROL SULFATE 2.5; .5 MG/3ML; MG/3ML
3 SOLUTION RESPIRATORY (INHALATION)
Status: DISCONTINUED | OUTPATIENT
Start: 2017-06-10 | End: 2017-06-11 | Stop reason: ALTCHOICE

## 2017-06-10 RX ORDER — SODIUM CHLORIDE AND POTASSIUM CHLORIDE .9; .15 G/100ML; G/100ML
SOLUTION INTRAVENOUS CONTINUOUS
Status: DISCONTINUED | OUTPATIENT
Start: 2017-06-10 | End: 2017-06-12

## 2017-06-10 RX ADMIN — POTASSIUM CHLORIDE 10 MEQ: 10 INJECTION, SOLUTION INTRAVENOUS at 18:05

## 2017-06-10 RX ADMIN — ENOXAPARIN SODIUM 40 MG: 40 INJECTION SUBCUTANEOUS at 09:16

## 2017-06-10 RX ADMIN — Medication: at 19:06

## 2017-06-10 RX ADMIN — LORAZEPAM 1 MG: 2 INJECTION INTRAMUSCULAR; INTRAVENOUS at 21:57

## 2017-06-10 RX ADMIN — POTASSIUM CHLORIDE AND SODIUM CHLORIDE: 900; 150 INJECTION, SOLUTION INTRAVENOUS at 14:10

## 2017-06-10 RX ADMIN — POTASSIUM CHLORIDE 10 MEQ: 10 INJECTION, SOLUTION INTRAVENOUS at 17:00

## 2017-06-10 RX ADMIN — LORAZEPAM 1 MG: 2 INJECTION INTRAMUSCULAR; INTRAVENOUS at 09:16

## 2017-06-10 RX ADMIN — POTASSIUM CHLORIDE 10 MEQ: 10 INJECTION, SOLUTION INTRAVENOUS at 14:18

## 2017-06-10 RX ADMIN — SODIUM CHLORIDE 10 MG: 9 INJECTION INTRAMUSCULAR; INTRAVENOUS; SUBCUTANEOUS at 03:44

## 2017-06-10 RX ADMIN — Medication: at 03:57

## 2017-06-10 RX ADMIN — POTASSIUM CHLORIDE 10 MEQ: 10 INJECTION, SOLUTION INTRAVENOUS at 16:49

## 2017-06-10 RX ADMIN — SODIUM CHLORIDE 10 MG: 9 INJECTION INTRAMUSCULAR; INTRAVENOUS; SUBCUTANEOUS at 18:59

## 2017-06-10 RX ADMIN — IPRATROPIUM BROMIDE AND ALBUTEROL SULFATE 3 ML: .5; 3 SOLUTION RESPIRATORY (INHALATION) at 20:45

## 2017-06-10 RX ADMIN — LORAZEPAM 1 MG: 2 INJECTION INTRAMUSCULAR; INTRAVENOUS at 00:26

## 2017-06-10 RX ADMIN — ONDANSETRON 4 MG: 2 INJECTION INTRAMUSCULAR; INTRAVENOUS at 21:57

## 2017-06-10 RX ADMIN — PANTOPRAZOLE SODIUM 40 MG: 40 TABLET, DELAYED RELEASE ORAL at 09:16

## 2017-06-10 RX ADMIN — ONDANSETRON 4 MG: 2 INJECTION INTRAMUSCULAR; INTRAVENOUS at 15:25

## 2017-06-10 RX ADMIN — ONDANSETRON 4 MG: 2 INJECTION INTRAMUSCULAR; INTRAVENOUS at 00:26

## 2017-06-10 RX ADMIN — IPRATROPIUM BROMIDE AND ALBUTEROL SULFATE 3 ML: .5; 3 SOLUTION RESPIRATORY (INHALATION) at 07:57

## 2017-06-10 RX ADMIN — SODIUM CHLORIDE 10 MG: 9 INJECTION INTRAMUSCULAR; INTRAVENOUS; SUBCUTANEOUS at 12:32

## 2017-06-10 RX ADMIN — SODIUM CHLORIDE 100 ML/HR: 900 INJECTION, SOLUTION INTRAVENOUS at 03:36

## 2017-06-10 RX ADMIN — POTASSIUM CHLORIDE 10 MEQ: 10 INJECTION, SOLUTION INTRAVENOUS at 15:26

## 2017-06-10 NOTE — PROGRESS NOTES
Bedside shift change report given to April (oncoming nurse) by Bobby Lynch (offgoing nurse). Report included the following information SBAR, Intake/Output, MAR, Recent Results and Cardiac Rhythm Sinus Tach.

## 2017-06-10 NOTE — PROGRESS NOTES
General Surgery Daily Progress Note    Admit Date: 2017  Post-Operative Day: 4 Days Post-Op from Procedure(s):  Diagnostic Laparoscopy, converted to open Exploratory Laparotomy, Lysis of adhesions for 7 hours/ Intraoperative Upper Endoscopy by Dr. Sarah Reyes. Subjective:     Last 24 hrs: Pt is having a lot of pain that the PcA isn't controlling. She is eager to get OOB and ambulate and sit in the chair. No n/v, tolerating clears; ileostomy starting to function       Objective:     Blood pressure 149/72, pulse (!) 113, temperature 98.7 °F (37.1 °C), resp. rate 20, height 5' 4\" (1.626 m), weight 332 lb 7.3 oz (150.8 kg), last menstrual period 2017, SpO2 97 %. Temp (24hrs), Av.6 °F (37 °C), Min:98.3 °F (36.8 °C), Max:98.7 °F (37.1 °C)      _____________________  Physical Exam:     Alert and Oriented, x3, in no acute distress. Cardiovascular: RRR, no peripheral edema  Lungs:CTAB anteriorally  Abdomen: obese, soft, +BS, CHRIS w/ ss drainage; drsg dry; incision w/ staples; ileostomy w/ some pasty stool      Assessment:   Active Problems:    Perforated bowel ([address] (2017)            Plan:     Cont clears  OOB/ IS  Replete K  Add K to IVF  Am labs  Gi/dvt proph    Data Review:    Recent Labs      17   0240  179   WBC  16.2*  16.5*   HGB  11.4*  12.3   HCT  36.1  38.8   PLT  153  141*     Recent Labs      06/10/17   0400  17   0240  17   0449   NA  137  140  139   K  2.9*  3.2*  3.2*   CL  104  106  106   CO2  22  25  25   GLU  57*  66  77   BUN  3*  6  13   CREA  0.52*  0.51*  0.64   CA  7.7*  7.8*  7.4*     No results for input(s): AML, LPSE in the last 72 hours.         ______________________  Medications:    Current Facility-Administered Medications   Medication Dose Route Frequency    albuterol-ipratropium (DUO-NEB) 2.5 MG-0.5 MG/3 ML  3 mL Nebulization BID RT    potassium chloride 10 mEq in 100 ml IVPB  10 mEq IntraVENous Q1H    0.9% sodium chloride with KCl 20 mEq/L infusion   IntraVENous CONTINUOUS    pantoprazole (PROTONIX) tablet 40 mg  40 mg Oral ACB    enoxaparin (LOVENOX) injection 40 mg  40 mg SubCUTAneous DAILY    LORazepam (ATIVAN) injection 1 mg  1 mg IntraVENous Q6H PRN    HYDROmorphone (PF) 15 mg/30 ml (DILAUDID) PCA   IntraVENous CONTINUOUS    ondansetron (ZOFRAN) injection 4 mg  4 mg IntraVENous Q4H PRN    albuterol (PROVENTIL VENTOLIN) nebulizer solution 2.5 mg  2.5 mg Nebulization Q4H PRN    prochlorperazine (COMPAZINE) with saline injection 10 mg  10 mg IntraVENous Q6H PRN    chlorproMAZINE (THORAZINE) 25 mg in 0.9% sodium chloride 50 mL ivpb  25 mg IntraVENous Q4H PRN       Marivel Marquez NP  6/10/2017      Pt seen and examined  Complains of nausea with drinking   No air in the appliance. Gen- alert in Mild distress  Lungs - CTA  H- RRR   Abd- soft and tender there is erythema along the abdominal wall. S/p Exlap KATHRINE  Encourage OOB and ambulate   Monitor nausea and limit PO intake  Monitor Abdominal incision - may need abx.

## 2017-06-10 NOTE — PROGRESS NOTES
[address]  [address] [place] [address], [address]    GI PROGRESS NOTE    NAME: Amelia Pedro   :  1975   MRN:  [id number]       Subjective:     FEELS BETTER, no BM, tolerating clear  Review of Systems    Constitutional: negative fever, negative chills, negative weight loss  Eyes:   negative visual changes  ENT:   negative sore throat, tongue or lip swelling  Respiratory:  negative cough, negative dyspnea  Cards:  negative for chest pain, palpitations, lower extremity edema  GI:   See HPI  :  negative for frequency, dysuria  Integument:  negative for rash and pruritus  Heme:  negative for easy bruising and gum/nose bleeding  Musculoskel: negative for myalgias,  back pain and muscle weakness  Neuro: negative for headaches, dizziness, vertigo  Psych:  negative for feelings of anxiety, depression         Objective:     VITALS:   Last 24hrs VS reviewed since prior progress note. Most recent are:  Visit Vitals    /58 (BP 1 Location: Left arm, BP Patient Position: At rest)    Pulse (!) 101    Temp 98.6 °F (37 °C)    Resp 20    Ht 5' 4\" (1.626 m)    Wt 150.8 kg (332 lb 7.3 oz)    SpO2 95%    BMI 57.07 kg/m2       Intake/Output Summary (Last 24 hours) at 06/10/17 1202  Last data filed at 06/10/17 0400   Gross per 24 hour   Intake          1741.66 ml   Output             1710 ml   Net            31.66 ml     PHYSICAL EXAM:  General: WD, WN. Alert, cooperative, no acute distress    HEENT: NC, Atraumatic. PERRLA, EOMI. Anicteric sclerae. Lungs:  CTA Bilaterally. No Wheezing/Rhonchi/Rales. Heart:  Regular  rhythm,  No murmur (), No Rubs, No Gallops  Abdomen: Soft, Non distended, Non tender.  +Bowel sounds, no HSM  Extremities: No c/c/e  Neurologic:  CN 2-12 gi, Alert and oriented X 3. No acute neurological distress   Psych:   Good insight. Not anxious nor agitated.     Lab Data Reviewed:   Recent Labs      17   0240  17   0449   WBC  16.2*  16.5*   HGB 11.4*  12.3   HCT  36.1  38.8   PLT  153  141*     Recent Labs      06/10/17   0400  06/09/17   0240   NA  137  140   K  2.9*  3.2*   CL  104  106   CO2  22  25   BUN  3*  6   CREA  0.52*  0.51*   GLU  57*  66   CA  7.7*  7.8*     No results for input(s): SGOT, GPT, AP, TBIL, TP, ALB, GLOB, GGT, AML, LPSE in the last 72 hours.     No lab exists for component: AMYP, HLPSE    ________________________________________________________________________       Assessment:   · S/p surgery for small bowel perforation  · ileus     Patient Active Problem List   Diagnosis Code    Crohn's disease ([facility] K50.90    DVT (deep venous thrombosis) ([facility]) I82.409    Incarcerated ventral hernia K46.0    Right flank pain R10.9    Perforated bowel ([facility] K63.1     Plan:   · Post op ileus  · May use relistor if agreeable with surgery  · Will follow     Signed By: Hector Estes MD     6/10/2017  12:02 PM

## 2017-06-10 NOTE — PROGRESS NOTES
06/09/17 2047   Vitals   Temp 98.7 °F (37.1 °C)   Temp Source Oral   Pulse (Heart Rate) (!) 117   Heart Rate Source Monitor   Resp Rate 18   O2 Sat (%) 96 %   Level of Consciousness Alert   /82   MAP (Calculated) 101   BP 1 Location Left arm   BP 1 Method Automatic   BP Patient Position At rest   MEWS Score 3   Pain 1   Pain Scale 1 Numeric (0 - 10)   Pain Intensity 1 7   Patient Stated Pain Goal 2   Pain Onset 1 post op   Pain Location 1 Abdomen   Pain Orientation 1 Anterior   Pain Description 1 Sore   Pain Intervention(s) 1 Encouraged PCA   MD aware, will continue to monitor

## 2017-06-10 NOTE — PROGRESS NOTES
Problem: Falls - Risk of  Goal: *Absence of falls  Outcome: Progressing Towards Goal  Pt alert and oriented. Walks with standby assist.  Precilla Michelle accessible. Call perez and belongings within reach. Goal: *Knowledge of fall prevention  Outcome: Progressing Towards Goal  Pt calls out appropriately for assistance. Problem: Pressure Injury - Risk of  Goal: *Prevention of pressure ulcer  Outcome: Progressing Towards Goal  Skin on pressure points cdi with no apparent breakdown. Pt ambulatory. Problem: Surgical Pathway Post-Op Day 2 through Discharge  Goal: *No signs and symptoms of infection or wound complications  Outcome: Progressing Towards Goal  Afebrile with no erythema or swelling. Goal: *Optimal pain control at patients stated goal  Outcome: Progressing Towards Goal  Pt states pain manageable at this time. Goal: *Adequate urinary output (equal to or greater than 30 milliliters/hour)  Outcome: Progressing Towards Goal  Pt producing adequate urinary output. Goal: *Hemodynamically stable  Outcome: Progressing Towards Goal  bp and hr stable. Problem: Infection - Risk of, Central Venous Catheter-Associated Bloodstream Infection  Goal: *Absence of infection signs and symptoms  Outcome: Progressing Towards Goal  Afebrile with no erythema or swelling. Chg dressing changed 6.9.17.

## 2017-06-10 NOTE — PROGRESS NOTES
06/10/17 1158   Vitals   Temp 98.7 °F (37.1 °C)   Temp Source Oral   Pulse (Heart Rate) (!) 113   Heart Rate Source Monitor   Resp Rate 20   O2 Sat (%) 97 %   Level of Consciousness Alert   /72   MAP (Calculated) 98   BP 1 Location Left arm   BP 1 Method Automatic   BP Patient Position At rest   MEWS Score 3   MD aware, patient asymptomatic. Will continue to monitor.

## 2017-06-10 NOTE — PROGRESS NOTES
06/09/17 2334   Vitals   Temp 98.7 °F (37.1 °C)   Temp Source Oral   Pulse (Heart Rate) (!) 116   Heart Rate Source Monitor   Resp Rate 20   O2 Sat (%) 98 %   Level of Consciousness Alert   /66   MAP (Calculated) 90   BP 1 Location Left arm   BP 1 Method Automatic   BP Patient Position At rest   MEWS Score 3   Pain 1   Pain Scale 1 Numeric (0 - 10)   Pain Intensity 1 7   Patient Stated Pain Goal 2   Pain Onset 1 post op   Pain Location 1 Abdomen   Pain Orientation 1 Anterior   Pain Description 1 Sore   Pain Intervention(s) 1 Encouraged PCA   MD aware, will continue to monitor

## 2017-06-10 NOTE — ROUTINE PROCESS
Bedside shift change report given to MAURI Duran (oncoming nurse) by Sherren Le, RN (offgoing nurse). Report included the following information SBAR, OR Summary, Procedure Summary, Recent Results, Med Rec Status and Cardiac Rhythm sinus tachycardia.

## 2017-06-11 LAB
ALBUMIN SERPL BCP-MCNC: 1.9 G/DL (ref 3.5–5)
ALBUMIN/GLOB SERPL: 0.6 {RATIO} (ref 1.1–2.2)
ALP SERPL-CCNC: 44 U/L (ref 45–117)
ALT SERPL-CCNC: 23 U/L (ref 12–78)
ANION GAP BLD CALC-SCNC: 8 MMOL/L (ref 5–15)
AST SERPL W P-5'-P-CCNC: 9 U/L (ref 15–37)
BASOPHILS # BLD AUTO: 0 K/UL (ref 0–0.1)
BASOPHILS # BLD: 0 % (ref 0–1)
BILIRUB SERPL-MCNC: 0.7 MG/DL (ref 0.2–1)
BUN SERPL-MCNC: 2 MG/DL (ref 6–20)
BUN/CREAT SERPL: 4 (ref 12–20)
CALCIUM SERPL-MCNC: 7.8 MG/DL (ref 8.5–10.1)
CHLORIDE SERPL-SCNC: 106 MMOL/L (ref 97–108)
CO2 SERPL-SCNC: 25 MMOL/L (ref 21–32)
CREAT SERPL-MCNC: 0.51 MG/DL (ref 0.55–1.02)
EOSINOPHIL # BLD: 0.1 K/UL (ref 0–0.4)
EOSINOPHIL NFR BLD: 1 % (ref 0–7)
ERYTHROCYTE [DISTWIDTH] IN BLOOD BY AUTOMATED COUNT: 14.6 % (ref 11.5–14.5)
GLOBULIN SER CALC-MCNC: 3.4 G/DL (ref 2–4)
GLUCOSE SERPL-MCNC: 82 MG/DL (ref 65–100)
HCT VFR BLD AUTO: 32.4 % (ref 35–47)
HGB BLD-MCNC: 10.5 G/DL (ref 11.5–16)
LYMPHOCYTES # BLD AUTO: 46 % (ref 12–49)
LYMPHOCYTES # BLD: 5 K/UL (ref 0.8–3.5)
MAGNESIUM SERPL-MCNC: 1.5 MG/DL (ref 1.6–2.4)
MCH RBC QN AUTO: 30.2 PG (ref 26–34)
MCHC RBC AUTO-ENTMCNC: 32.4 G/DL (ref 30–36.5)
MCV RBC AUTO: 93.1 FL (ref 80–99)
MONOCYTES # BLD: 0.7 K/UL (ref 0–1)
MONOCYTES NFR BLD AUTO: 7 % (ref 5–13)
NEUTS SEG # BLD: 5 K/UL (ref 1.8–8)
NEUTS SEG NFR BLD AUTO: 46 % (ref 32–75)
PHOSPHATE SERPL-MCNC: 2.4 MG/DL (ref 2.6–4.7)
PLATELET # BLD AUTO: 171 K/UL (ref 150–400)
POTASSIUM SERPL-SCNC: 3.3 MMOL/L (ref 3.5–5.1)
PROT SERPL-MCNC: 5.3 G/DL (ref 6.4–8.2)
RBC # BLD AUTO: 3.48 M/UL (ref 3.8–5.2)
SODIUM SERPL-SCNC: 139 MMOL/L (ref 136–145)
WBC # BLD AUTO: 10.9 K/UL (ref 3.6–11)

## 2017-06-11 PROCEDURE — 74011250637 HC RX REV CODE- 250/637: Performed by: SURGERY

## 2017-06-11 PROCEDURE — 74011250636 HC RX REV CODE- 250/636: Performed by: SURGERY

## 2017-06-11 PROCEDURE — 36415 COLL VENOUS BLD VENIPUNCTURE: CPT | Performed by: SURGERY

## 2017-06-11 PROCEDURE — 74011000250 HC RX REV CODE- 250: Performed by: INTERNAL MEDICINE

## 2017-06-11 PROCEDURE — 84100 ASSAY OF PHOSPHORUS: CPT | Performed by: SURGERY

## 2017-06-11 PROCEDURE — 65660000000 HC RM CCU STEPDOWN

## 2017-06-11 PROCEDURE — 74011250636 HC RX REV CODE- 250/636: Performed by: NURSE PRACTITIONER

## 2017-06-11 PROCEDURE — 83735 ASSAY OF MAGNESIUM: CPT | Performed by: SURGERY

## 2017-06-11 PROCEDURE — 80053 COMPREHEN METABOLIC PANEL: CPT | Performed by: SURGERY

## 2017-06-11 PROCEDURE — 74011250636 HC RX REV CODE- 250/636: Performed by: INTERNAL MEDICINE

## 2017-06-11 PROCEDURE — 85025 COMPLETE CBC W/AUTO DIFF WBC: CPT | Performed by: SURGERY

## 2017-06-11 RX ADMIN — ONDANSETRON 4 MG: 2 INJECTION INTRAMUSCULAR; INTRAVENOUS at 22:56

## 2017-06-11 RX ADMIN — LORAZEPAM 1 MG: 2 INJECTION INTRAMUSCULAR; INTRAVENOUS at 07:30

## 2017-06-11 RX ADMIN — ONDANSETRON 4 MG: 2 INJECTION INTRAMUSCULAR; INTRAVENOUS at 07:30

## 2017-06-11 RX ADMIN — Medication: at 03:21

## 2017-06-11 RX ADMIN — ENOXAPARIN SODIUM 40 MG: 40 INJECTION SUBCUTANEOUS at 08:49

## 2017-06-11 RX ADMIN — POTASSIUM CHLORIDE AND SODIUM CHLORIDE: 900; 150 INJECTION, SOLUTION INTRAVENOUS at 13:57

## 2017-06-11 RX ADMIN — SODIUM CHLORIDE 10 MG: 9 INJECTION INTRAMUSCULAR; INTRAVENOUS; SUBCUTANEOUS at 03:41

## 2017-06-11 RX ADMIN — PANTOPRAZOLE SODIUM 40 MG: 40 TABLET, DELAYED RELEASE ORAL at 07:15

## 2017-06-11 RX ADMIN — SODIUM CHLORIDE 10 MG: 9 INJECTION INTRAMUSCULAR; INTRAVENOUS; SUBCUTANEOUS at 13:57

## 2017-06-11 RX ADMIN — Medication: at 15:24

## 2017-06-11 RX ADMIN — Medication: at 20:16

## 2017-06-11 RX ADMIN — POTASSIUM CHLORIDE AND SODIUM CHLORIDE: 900; 150 INJECTION, SOLUTION INTRAVENOUS at 03:24

## 2017-06-11 RX ADMIN — LORAZEPAM 1 MG: 2 INJECTION INTRAMUSCULAR; INTRAVENOUS at 16:03

## 2017-06-11 RX ADMIN — ONDANSETRON 4 MG: 2 INJECTION INTRAMUSCULAR; INTRAVENOUS at 19:02

## 2017-06-11 RX ADMIN — LORAZEPAM 1 MG: 2 INJECTION INTRAMUSCULAR; INTRAVENOUS at 22:56

## 2017-06-11 NOTE — PROGRESS NOTES
Bedside shift change report given to Birgit Warner RN (oncoming nurse) by April, RN (offgoing nurse). Report included the following information SBAR, Kardex, Intake/Output, MAR, Accordion and Cardiac Rhythm Sinus Tach.

## 2017-06-11 NOTE — PROGRESS NOTES
Bedside shift change report given to April (oncoming nurse) by Fredi Flynn (offgoing nurse). Report included the following information SBAR, Intake/Output, MAR, Recent Results and Cardiac Rhythm NSR- Sinus Tach.

## 2017-06-11 NOTE — PROGRESS NOTES
Progress Note    Patient: Sierra Montiel MRN: [id number]  SSN: [id number]    YOB: 1975  Age: 46 y.o. Sex: female      Admit Date: 2017    5 Days Post-Op    Procedure:  Procedure(s):  Diagnostic Laparoscopy, converted to open Exploratory Laparotomy, Lysis of adhesions for 7 hours/ Intraoperative Upper Endoscopy by Dr. Danae Munguia. Subjective:     Patient feeling a little better today. Had increased stool output. Less nausea. Has been up and ambulating     Objective:     Visit Vitals    /64 (BP 1 Location: Left arm, BP Patient Position: At rest)    Pulse 95    Temp 98.5 °F (36.9 °C)    Resp 20    Ht 5' 4\" (1.626 m)    Wt 333 lb 8.9 oz (151.3 kg)    SpO2 96%    BMI 57.25 kg/m2       Temp (24hrs), Av.7 °F (37.1 °C), Min:98.3 °F (36.8 °C), Max:99.3 °F (37.4 °C)      Physical Exam:    GENERAL: alert, cooperative, no distress, appears stated age, LUNG: clear to auscultation bilaterally, HEART: regular rate and rhythm, ABDOMEN: soft,  With some incisional tenderness. There is some erythema but this is unchanged. There is no drainage. Data Review: images and reports reviewed    Lab Review:   .lab   Recent Results (from the past 24 hour(s))   METABOLIC PANEL, COMPREHENSIVE    Collection Time: 17  3:30 AM   Result Value Ref Range    Sodium 139 136 - 145 mmol/L    Potassium 3.3 (L) 3.5 - 5.1 mmol/L    Chloride 106 97 - 108 mmol/L    CO2 25 21 - 32 mmol/L    Anion gap 8 5 - 15 mmol/L    Glucose 82 65 - 100 mg/dL    BUN 2 (L) 6 - 20 MG/DL    Creatinine 0.51 (L) 0.55 - 1.02 MG/DL    BUN/Creatinine ratio 4 (L) 12 - 20      GFR est AA >60 >60 ml/min/1.73m2    GFR est non-AA >60 >60 ml/min/1.73m2    Calcium 7.8 (L) 8.5 - 10.1 MG/DL    Bilirubin, total 0.7 0.2 - 1.0 MG/DL    ALT (SGPT) 23 12 - 78 U/L    AST (SGOT) 9 (L) 15 - 37 U/L    Alk.  phosphatase 44 (L) 45 - 117 U/L    Protein, total 5.3 (L) 6.4 - 8.2 g/dL    Albumin 1.9 (L) 3.5 - 5.0 g/dL    Globulin 3.4 2.0 - 4.0 g/dL A-G Ratio 0.6 (L) 1.1 - 2.2     MAGNESIUM    Collection Time: 06/11/17  3:30 AM   Result Value Ref Range    Magnesium 1.5 (L) 1.6 - 2.4 mg/dL   PHOSPHORUS    Collection Time: 06/11/17  3:30 AM   Result Value Ref Range    Phosphorus 2.4 (L) 2.6 - 4.7 MG/DL   CBC WITH AUTOMATED DIFF    Collection Time: 06/11/17  3:30 AM   Result Value Ref Range    WBC 10.9 3.6 - 11.0 K/uL    RBC 3.48 (L) 3.80 - 5.20 M/uL    HGB 10.5 (L) 11.5 - 16.0 g/dL    HCT 32.4 (L) 35.0 - 47.0 %    MCV 93.1 80.0 - 99.0 FL    MCH 30.2 26.0 - 34.0 PG    MCHC 32.4 30.0 - 36.5 g/dL    RDW 14.6 (H) 11.5 - 14.5 %    PLATELET 751 930 - 410 K/uL    NEUTROPHILS 46 32 - 75 %    LYMPHOCYTES 46 12 - 49 %    MONOCYTES 7 5 - 13 %    EOSINOPHILS 1 0 - 7 %    BASOPHILS 0 0 - 1 %    ABS. NEUTROPHILS 5.0 1.8 - 8.0 K/UL    ABS. LYMPHOCYTES 5.0 (H) 0.8 - 3.5 K/UL    ABS. MONOCYTES 0.7 0.0 - 1.0 K/UL    ABS. EOSINOPHILS 0.1 0.0 - 0.4 K/UL    ABS. BASOPHILS 0.0 0.0 - 0.1 K/UL         Assessment:     Hospital Problems  Date Reviewed: 6/6/2017          Codes Class Noted POA    Perforated bowel [facility]) ICD-10-CM: K63.1  ICD-9-CM: 569.83  6/6/2017 Unknown              Plan/Recommendations/Medical Decision Making:     will continue clears liquids for now   Await increased bowel function and resolution of nausea to advance diet. Continue ambulation  Continue to monitor the wound. Will hold on ABx.  As there redness is unchanged and the wbc is improving will hold on ABX>   Signed By: Diony Cardenas MD     June 11, 2017

## 2017-06-11 NOTE — PROGRESS NOTES
- Patient scheduled DuoNeb d/c. Patient pulmonary status has been stable since admission with no recorded adventitious breath sounds requiring medication. PRN medication left on MAR. RN made aware of medication change. 06/11/17 1423   Assessment   Breath Sounds Bilateral Upper;Middle; Lower;Clear   Level of Activity/Mobility 1   O2 Device Room air   Position Ambulating   Resp Rate 18   Pulse (Heart Rate) (!) 130   Chest X-Ray Clear     ADULT PROTOCOL: JET AEROSOL ASSESSMENT    Patient  Amelia Pedro     46 y.o.   female     6/11/2017  2:33 PM    Breath Sounds Pre Procedure: Right Breath Sounds: Diminished                               Left Breath Sounds: Diminished    Breath Sounds Post Procedure: Right Breath Sounds: Clear, Diminished                                 Left Breath Sounds: Clear, Diminished    Breathing pattern: Pre procedure Breathing Pattern: Regular          Post procedure Breathing Pattern: Regular    Heart Rate: Pre procedure Pulse: 100           Post procedure Pulse: 121    Resp Rate: Pre procedure Respirations: 16           Post procedure Respirations: 18    Peak Flow: Pre bronchodilator             Post bronchodilator         Incentive Spirometry:  Actual Volume (ml): 1100 ml          Cough: Pre procedure Cough: Productive, Congested               Post procedure Cough: Non-productive    Suctioned: No    Sputum: Pre procedure Sputum amount: Scant  Sputum color/odor: Yellow  Sputum consistency:  Thick  Sputum method obtained: Cough on request                 Post procedure      Oxygen: O2 Device: Room air        Changed: No    SpO2: Pre procedure SpO2: 97 %  without oxygen              Post procedure SpO2: 97 % without oxygen    Nebulizer Therapy: Current medications Aerosolized Medications: DuoNeb      Changed: Yes    Smoking History: No    Problem List:   Patient Active Problem List   Diagnosis Code    Crohn's disease ([facility] K50.90    DVT (deep venous thrombosis) ([facility]) I82.409    Incarcerated ventral hernia K46.0    Right flank pain R10.9    Perforated bowel ([address] K63.1     - Will continue to monitor and assess respiratory/pulmonary status as scheduled and needed    Respiratory Therapist: Sarah Roberson RRT-NPS

## 2017-06-11 NOTE — PROGRESS NOTES
[address] [facility] [address], [address] [facility]    GI PROGRESS NOTE    NAME: Carlos Mayo   :  1975   MRN:  [id number]       Subjective:     Had BM, still in pain  Review of Systems    Constitutional: negative fever, negative chills, negative weight loss  Eyes:   negative visual changes  ENT:   negative sore throat, tongue or lip swelling  Respiratory:  negative cough, negative dyspnea  Cards:  negative for chest pain, palpitations, lower extremity edema  GI:   See HPI  :  negative for frequency, dysuria  Integument:  negative for rash and pruritus  Heme:  negative for easy bruising and gum/nose bleeding  Musculoskel: negative for myalgias,  back pain and muscle weakness  Neuro: negative for headaches, dizziness, vertigo  Psych:  negative for feelings of anxiety, depression         Objective:     VITALS:   Last 24hrs VS reviewed since prior progress note. Most recent are:  Visit Vitals    /64 (BP 1 Location: Left arm, BP Patient Position: At rest)    Pulse 95    Temp 98.5 °F (36.9 °C)    Resp 20    Ht 5' 4\" (1.626 m)    Wt 151.3 kg (333 lb 8.9 oz)    SpO2 96%    BMI 57.25 kg/m2       Intake/Output Summary (Last 24 hours) at 17 1236  Last data filed at 17 1214   Gross per 24 hour   Intake          2571.66 ml   Output             2260 ml   Net           311.66 ml     PHYSICAL EXAM:  General: WD, WN. Alert, cooperative, no acute distress    HEENT: NC, Atraumatic. PERRLA, EOMI. Anicteric sclerae. Lungs:  CTA Bilaterally. No Wheezing/Rhonchi/Rales. Heart:  Regular  rhythm,  No murmur (), No Rubs, No Gallops  Abdomen: Soft, Non distended, Non tender.  +Bowel sounds, no HSM  Extremities: No c/c/e  Neurologic:  CN 2-12 gi, Alert and oriented X 3. No acute neurological distress   Psych:   Good insight. Not anxious nor agitated.     Lab Data Reviewed:   Recent Labs      17   0330  17   0240   WBC  10.9  16.2*   HGB  10.5*  11.4*   HCT 32.4*  36.1   PLT  171  153     Recent Labs      06/11/17   0330  06/10/17   0400   NA  139  137   K  3.3*  2.9*   CL  106  104   CO2  25  22   BUN  2*  3*   CREA  0.51*  0.52*   GLU  82  57*   PHOS  2.4*   --    CA  7.8*  7.7*     Recent Labs      06/11/17   0330   SGOT  9*   AP  44*   TP  5.3*   ALB  1.9*   GLOB  3.4       ________________________________________________________________________       Assessment:   · S/p surgery for small bowel perforation  · ileus     Patient Active Problem List   Diagnosis Code    [facility]'s disease ([facility] K50.90    DVT (deep venous thrombosis) ([facility]) I82.409    Incarcerated ventral hernia K46.0    Right flank pain R10.9    Perforated bowel ([facility] K63.1     Plan:     · Post op care per  surgery  · Dr. Edel Whipple Will follow     Signed By: David Isaac MD     6/11/2017

## 2017-06-11 NOTE — PROGRESS NOTES
Problem: Falls - Risk of  Goal: *Absence of falls  Outcome: Progressing Towards Goal  Pt alert and oriented. Walks with one assist. BSC accessible. Call bell and belongings within reach  Goal: *Knowledge of fall prevention  Outcome: Progressing Towards Goal  Pt calls out appropriately for assistance. Problem: Pressure Injury - Risk of  Goal: *Prevention of pressure ulcer  Outcome: Progressing Towards Goal  Skin on pressure points cdi with no apparent breakdown    Problem: Infection - Risk of, Central Venous Catheter-Associated Bloodstream Infection  Goal: *Absence of infection signs and symptoms  Outcome: Progressing Towards Goal  Afebrile with no erythema or swelling. Dressing within date.

## 2017-06-12 LAB
ALBUMIN SERPL BCP-MCNC: 2 G/DL (ref 3.5–5)
ALBUMIN/GLOB SERPL: 0.6 {RATIO} (ref 1.1–2.2)
ALP SERPL-CCNC: 48 U/L (ref 45–117)
ALT SERPL-CCNC: 23 U/L (ref 12–78)
ANION GAP BLD CALC-SCNC: 8 MMOL/L (ref 5–15)
AST SERPL W P-5'-P-CCNC: 13 U/L (ref 15–37)
BASOPHILS # BLD AUTO: 0.1 K/UL (ref 0–0.1)
BASOPHILS # BLD: 1 % (ref 0–1)
BILIRUB SERPL-MCNC: 0.6 MG/DL (ref 0.2–1)
BUN SERPL-MCNC: 1 MG/DL (ref 6–20)
BUN/CREAT SERPL: 2 (ref 12–20)
CALCIUM SERPL-MCNC: 8.2 MG/DL (ref 8.5–10.1)
CHLORIDE SERPL-SCNC: 106 MMOL/L (ref 97–108)
CO2 SERPL-SCNC: 26 MMOL/L (ref 21–32)
CREAT SERPL-MCNC: 0.52 MG/DL (ref 0.55–1.02)
DIFFERENTIAL METHOD BLD: ABNORMAL
EOSINOPHIL # BLD: 0 K/UL (ref 0–0.4)
EOSINOPHIL NFR BLD: 0 % (ref 0–7)
ERYTHROCYTE [DISTWIDTH] IN BLOOD BY AUTOMATED COUNT: 14.7 % (ref 11.5–14.5)
GLOBULIN SER CALC-MCNC: 3.6 G/DL (ref 2–4)
GLUCOSE SERPL-MCNC: 73 MG/DL (ref 65–100)
HCT VFR BLD AUTO: 34.5 % (ref 35–47)
HGB BLD-MCNC: 11.3 G/DL (ref 11.5–16)
LYMPHOCYTES # BLD AUTO: 52 % (ref 12–49)
LYMPHOCYTES # BLD: 7.1 K/UL (ref 0.8–3.5)
MAGNESIUM SERPL-MCNC: 1.6 MG/DL (ref 1.6–2.4)
MCH RBC QN AUTO: 30.5 PG (ref 26–34)
MCHC RBC AUTO-ENTMCNC: 32.8 G/DL (ref 30–36.5)
MCV RBC AUTO: 93.2 FL (ref 80–99)
MONOCYTES # BLD: 0.7 K/UL (ref 0–1)
MONOCYTES NFR BLD AUTO: 5 % (ref 5–13)
NEUTS SEG # BLD: 5.8 K/UL (ref 1.8–8)
NEUTS SEG NFR BLD AUTO: 42 % (ref 32–75)
PHOSPHATE SERPL-MCNC: 2.8 MG/DL (ref 2.6–4.7)
PLATELET # BLD AUTO: 206 K/UL (ref 150–400)
POTASSIUM SERPL-SCNC: 3.4 MMOL/L (ref 3.5–5.1)
PROT SERPL-MCNC: 5.6 G/DL (ref 6.4–8.2)
RBC # BLD AUTO: 3.7 M/UL (ref 3.8–5.2)
RBC MORPH BLD: ABNORMAL
RBC MORPH BLD: ABNORMAL
SODIUM SERPL-SCNC: 140 MMOL/L (ref 136–145)
WBC # BLD AUTO: 13.7 K/UL (ref 3.6–11)
WBC MORPH BLD: ABNORMAL

## 2017-06-12 PROCEDURE — 80053 COMPREHEN METABOLIC PANEL: CPT | Performed by: SURGERY

## 2017-06-12 PROCEDURE — 74011250637 HC RX REV CODE- 250/637: Performed by: SURGERY

## 2017-06-12 PROCEDURE — 74011250636 HC RX REV CODE- 250/636: Performed by: SURGERY

## 2017-06-12 PROCEDURE — 36415 COLL VENOUS BLD VENIPUNCTURE: CPT | Performed by: SURGERY

## 2017-06-12 PROCEDURE — 83735 ASSAY OF MAGNESIUM: CPT | Performed by: SURGERY

## 2017-06-12 PROCEDURE — 85025 COMPLETE CBC W/AUTO DIFF WBC: CPT | Performed by: SURGERY

## 2017-06-12 PROCEDURE — 74011250636 HC RX REV CODE- 250/636: Performed by: NURSE PRACTITIONER

## 2017-06-12 PROCEDURE — 65210000002 HC RM PRIVATE GYN

## 2017-06-12 PROCEDURE — 84100 ASSAY OF PHOSPHORUS: CPT | Performed by: SURGERY

## 2017-06-12 RX ORDER — SODIUM CHLORIDE 0.9 % (FLUSH) 0.9 %
10 SYRINGE (ML) INJECTION AS NEEDED
Status: DISCONTINUED | OUTPATIENT
Start: 2017-06-12 | End: 2017-06-21 | Stop reason: HOSPADM

## 2017-06-12 RX ORDER — MORPHINE SULFATE 60 MG/1
60 TABLET, FILM COATED, EXTENDED RELEASE ORAL 3 TIMES DAILY
Status: DISCONTINUED | OUTPATIENT
Start: 2017-06-12 | End: 2017-06-21 | Stop reason: HOSPADM

## 2017-06-12 RX ORDER — SODIUM CHLORIDE 0.9 % (FLUSH) 0.9 %
10 SYRINGE (ML) INJECTION EVERY 8 HOURS
Status: DISCONTINUED | OUTPATIENT
Start: 2017-06-12 | End: 2017-06-21 | Stop reason: HOSPADM

## 2017-06-12 RX ORDER — MORPHINE SULFATE 15 MG/1
30 TABLET ORAL
Status: DISCONTINUED | OUTPATIENT
Start: 2017-06-12 | End: 2017-06-20 | Stop reason: SDUPTHER

## 2017-06-12 RX ORDER — SODIUM CHLORIDE 0.9 % (FLUSH) 0.9 %
10 SYRINGE (ML) INJECTION EVERY 24 HOURS
Status: DISCONTINUED | OUTPATIENT
Start: 2017-06-12 | End: 2017-06-21 | Stop reason: HOSPADM

## 2017-06-12 RX ORDER — BACITRACIN 500 UNIT/G
1 PACKET (EA) TOPICAL AS NEEDED
Status: DISCONTINUED | OUTPATIENT
Start: 2017-06-12 | End: 2017-06-21 | Stop reason: HOSPADM

## 2017-06-12 RX ORDER — SODIUM CHLORIDE 0.9 % (FLUSH) 0.9 %
20 SYRINGE (ML) INJECTION AS NEEDED
Status: DISCONTINUED | OUTPATIENT
Start: 2017-06-12 | End: 2017-06-21 | Stop reason: HOSPADM

## 2017-06-12 RX ORDER — POTASSIUM CHLORIDE 750 MG/1
40 TABLET, FILM COATED, EXTENDED RELEASE ORAL 2 TIMES DAILY
Status: DISCONTINUED | OUTPATIENT
Start: 2017-06-12 | End: 2017-06-17

## 2017-06-12 RX ADMIN — Medication 10 ML: at 19:48

## 2017-06-12 RX ADMIN — Medication: at 07:46

## 2017-06-12 RX ADMIN — Medication 10 ML: at 21:15

## 2017-06-12 RX ADMIN — Medication: at 00:29

## 2017-06-12 RX ADMIN — ONDANSETRON 4 MG: 2 INJECTION INTRAMUSCULAR; INTRAVENOUS at 19:48

## 2017-06-12 RX ADMIN — POTASSIUM CHLORIDE 40 MEQ: 750 TABLET, FILM COATED, EXTENDED RELEASE ORAL at 16:55

## 2017-06-12 RX ADMIN — MORPHINE SULFATE 60 MG: 60 TABLET, FILM COATED, EXTENDED RELEASE ORAL at 09:15

## 2017-06-12 RX ADMIN — MORPHINE SULFATE 30 MG: 15 TABLET ORAL at 23:34

## 2017-06-12 RX ADMIN — MORPHINE SULFATE 30 MG: 15 TABLET ORAL at 19:48

## 2017-06-12 RX ADMIN — MORPHINE SULFATE 60 MG: 60 TABLET, FILM COATED, EXTENDED RELEASE ORAL at 21:15

## 2017-06-12 RX ADMIN — MORPHINE SULFATE 60 MG: 60 TABLET, FILM COATED, EXTENDED RELEASE ORAL at 16:55

## 2017-06-12 RX ADMIN — PANTOPRAZOLE SODIUM 40 MG: 40 TABLET, DELAYED RELEASE ORAL at 06:31

## 2017-06-12 RX ADMIN — POTASSIUM CHLORIDE 40 MEQ: 750 TABLET, FILM COATED, EXTENDED RELEASE ORAL at 09:15

## 2017-06-12 RX ADMIN — ENOXAPARIN SODIUM 40 MG: 40 INJECTION SUBCUTANEOUS at 09:16

## 2017-06-12 RX ADMIN — Medication: at 04:27

## 2017-06-12 RX ADMIN — ONDANSETRON 4 MG: 2 INJECTION INTRAMUSCULAR; INTRAVENOUS at 04:19

## 2017-06-12 RX ADMIN — POTASSIUM CHLORIDE AND SODIUM CHLORIDE: 900; 150 INJECTION, SOLUTION INTRAVENOUS at 00:21

## 2017-06-12 RX ADMIN — MORPHINE SULFATE 30 MG: 15 TABLET ORAL at 12:26

## 2017-06-12 RX ADMIN — Medication 10 ML: at 19:49

## 2017-06-12 NOTE — PROGRESS NOTES
[address]  [address], [address]       GI PROGRESS NOTE  [facility]  [phone]    NAME: Mario Ledezma   :  1975   MRN:  [id number]       Subjective: Incisional pain, ileostomy is functioning. Tolerating diet. Objective:     VITALS:   Last 24hrs VS reviewed since prior progress note. Most recent are:  Visit Vitals    /81 (BP 1 Location: Left arm)    Pulse (!) 108    Temp 98.1 °F (36.7 °C)    Resp 20    Ht 5' 4\" (1.626 m)    Wt 156 kg (343 lb 14.7 oz)    SpO2 95%    BMI 59.03 kg/m2       PHYSICAL EXAM:  General: Cooperative, no acute distress    Neurologic:  Alert and oriented X 3. HEENT: PERRLA, EOMI. Lungs:  Scattered rhonchi  Heart:  s1 s2  Abdomen: Soft, Non distended, tender, incision CDI, CHRIS to right, ileostomy on left  Extremities: No edema  Psych:   Good insight    Lab Data Reviewed:     Recent Results (from the past 24 hour(s))   METABOLIC PANEL, COMPREHENSIVE    Collection Time: 17  4:10 AM   Result Value Ref Range    Sodium 140 136 - 145 mmol/L    Potassium 3.4 (L) 3.5 - 5.1 mmol/L    Chloride 106 97 - 108 mmol/L    CO2 26 21 - 32 mmol/L    Anion gap 8 5 - 15 mmol/L    Glucose 73 65 - 100 mg/dL    BUN 1 (L) 6 - 20 MG/DL    Creatinine 0.52 (L) 0.55 - 1.02 MG/DL    BUN/Creatinine ratio 2 (L) 12 - 20      GFR est AA >60 >60 ml/min/1.73m2    GFR est non-AA >60 >60 ml/min/1.73m2    Calcium 8.2 (L) 8.5 - 10.1 MG/DL    Bilirubin, total 0.6 0.2 - 1.0 MG/DL    ALT (SGPT) 23 12 - 78 U/L    AST (SGOT) 13 (L) 15 - 37 U/L    Alk.  phosphatase 48 45 - 117 U/L    Protein, total 5.6 (L) 6.4 - 8.2 g/dL    Albumin 2.0 (L) 3.5 - 5.0 g/dL    Globulin 3.6 2.0 - 4.0 g/dL    A-G Ratio 0.6 (L) 1.1 - 2.2     MAGNESIUM    Collection Time: 17  4:10 AM   Result Value Ref Range    Magnesium 1.6 1.6 - 2.4 mg/dL   PHOSPHORUS    Collection Time: 17  4:10 AM   Result Value Ref Range    Phosphorus 2.8 2.6 - 4.7 MG/DL   CBC WITH AUTOMATED DIFF    Collection Time: 06/12/17  4:10 AM   Result Value Ref Range    WBC 13.7 (H) 3.6 - 11.0 K/uL    RBC 3.70 (L) 3.80 - 5.20 M/uL    HGB 11.3 (L) 11.5 - 16.0 g/dL    HCT 34.5 (L) 35.0 - 47.0 %    MCV 93.2 80.0 - 99.0 FL    MCH 30.5 26.0 - 34.0 PG    MCHC 32.8 30.0 - 36.5 g/dL    RDW 14.7 (H) 11.5 - 14.5 %    PLATELET 509 027 - 748 K/uL    NEUTROPHILS 42 32 - 75 %    LYMPHOCYTES 52 (H) 12 - 49 %    MONOCYTES 5 5 - 13 %    EOSINOPHILS 0 0 - 7 %    BASOPHILS 1 0 - 1 %    ABS. NEUTROPHILS 5.8 1.8 - 8.0 K/UL    ABS. LYMPHOCYTES 7.1 (H) 0.8 - 3.5 K/UL    ABS. MONOCYTES 0.7 0.0 - 1.0 K/UL    ABS. EOSINOPHILS 0.0 0.0 - 0.4 K/UL    ABS. BASOPHILS 0.1 0.0 - 0.1 K/UL    DF MANUAL      RBC COMMENTS ANISOCYTOSIS  1+        RBC COMMENTS POLYCHROMASIA  1+        WBC COMMENTS TOXIC GRANULATION           ________________________________________________________________________       Assessment:   · S/p surgery for small bowel perforation  · Crohn's disease     Patient Active Problem List   Diagnosis Code    Crohn's disease ([facility] K50.90    DVT (deep venous thrombosis) ([facility] I82.409    Incarcerated ventral hernia K46.0    Right flank pain R10.9    Perforated bowel ([facility] K63.1     Plan:   · Plan per surgery  · Slowly improving     Signed By: Jimmy Cardona NP     6/12/2017  12:18 PM       GI Attending: Patient seen and examined. Appreciate Surgery team's care. No additional plans at this time. Consider restarting patient's Relistor tomorrow. Crohn's medications to be held at this time and restarted at outpatient follow up appointment    Brandie Garsia.  Kathrine Lee MD

## 2017-06-12 NOTE — PROGRESS NOTES
Ms. Chaz Israel feels better today. Tm 98.8 HR: 92 BP: 135/62 Resp Rate: 20 per minute 97% sat on room air. Intake/Output Summary (Last 24 hours) at 06/12/17 0847  Last data filed at 06/11/17 1914   Gross per 24 hour   Intake              885 ml   Output              440 ml   Net              445 ml   Exam: Cor: RRR. Lungs: Bilat BS, Clear to auscultation. Abd: Soft, Non distended. Non tender. No rebound or guarding. Ileostomy working. Incision clean. Serous drainage on dressing. Serosanguinous drain output. Labs:   Recent Results (from the past 12 hour(s))   METABOLIC PANEL, COMPREHENSIVE    Collection Time: 06/12/17  4:10 AM   Result Value Ref Range    Sodium 140 136 - 145 mmol/L    Potassium 3.4 (L) 3.5 - 5.1 mmol/L    Chloride 106 97 - 108 mmol/L    CO2 26 21 - 32 mmol/L    Anion gap 8 5 - 15 mmol/L    Glucose 73 65 - 100 mg/dL    BUN 1 (L) 6 - 20 MG/DL    Creatinine 0.52 (L) 0.55 - 1.02 MG/DL    BUN/Creatinine ratio 2 (L) 12 - 20      GFR est AA >60 >60 ml/min/1.73m2    GFR est non-AA >60 >60 ml/min/1.73m2    Calcium 8.2 (L) 8.5 - 10.1 MG/DL    Bilirubin, total 0.6 0.2 - 1.0 MG/DL    ALT (SGPT) 23 12 - 78 U/L    AST (SGOT) 13 (L) 15 - 37 U/L    Alk.  phosphatase 48 45 - 117 U/L    Protein, total 5.6 (L) 6.4 - 8.2 g/dL    Albumin 2.0 (L) 3.5 - 5.0 g/dL    Globulin 3.6 2.0 - 4.0 g/dL    A-G Ratio 0.6 (L) 1.1 - 2.2     MAGNESIUM    Collection Time: 06/12/17  4:10 AM   Result Value Ref Range    Magnesium 1.6 1.6 - 2.4 mg/dL   PHOSPHORUS    Collection Time: 06/12/17  4:10 AM   Result Value Ref Range    Phosphorus 2.8 2.6 - 4.7 MG/DL   CBC WITH AUTOMATED DIFF    Collection Time: 06/12/17  4:10 AM   Result Value Ref Range    WBC 13.7 (H) 3.6 - 11.0 K/uL    RBC 3.70 (L) 3.80 - 5.20 M/uL    HGB 11.3 (L) 11.5 - 16.0 g/dL    HCT 34.5 (L) 35.0 - 47.0 %    MCV 93.2 80.0 - 99.0 FL    MCH 30.5 26.0 - 34.0 PG    MCHC 32.8 30.0 - 36.5 g/dL    RDW 14.7 (H) 11.5 - 14.5 %    PLATELET 279 597 - 110 K/uL    NEUTROPHILS 42 32 - 75 %    LYMPHOCYTES 52 (H) 12 - 49 %    MONOCYTES 5 5 - 13 %    EOSINOPHILS 0 0 - 7 %    BASOPHILS 1 0 - 1 %    ABS. NEUTROPHILS 5.8 1.8 - 8.0 K/UL    ABS. LYMPHOCYTES 7.1 (H) 0.8 - 3.5 K/UL    ABS. MONOCYTES 0.7 0.0 - 1.0 K/UL    ABS. EOSINOPHILS 0.0 0.0 - 0.4 K/UL    ABS. BASOPHILS 0.1 0.0 - 0.1 K/UL    DF MANUAL      RBC COMMENTS ANISOCYTOSIS  1+        RBC COMMENTS POLYCHROMASIA  1+        WBC COMMENTS TOXIC GRANULATION     Will try GI lite diet. Saline lock IVF. Stop PCA and restart home pain meds. OOB, Ambulate. GI following. Lovenox and Protonix as ordered. Replace K 3.4. Labs in AM.  Will transfer to [facility].

## 2017-06-12 NOTE — PROGRESS NOTES
CM reviewed chart and noted transfer to [employer]. CM will complete assessment tomorrow.   MOUNIKA Ospina, ACM

## 2017-06-12 NOTE — PROGRESS NOTES
TRANSFER - OUT REPORT:    Verbal report given to April RN(name) on Aziza Yip  being transferred to (unit) for routine progression of care       Report consisted of patients Situation, Background, Assessment and   Recommendations(SBAR). Information from the following report(s) SBAR, Procedure Summary, Intake/Output, MAR, Recent Results and Cardiac Rhythm NSR/Sinus Tach was reviewed with the receiving nurse. Lines:   Quad Lumen 06/06/17 Right Internal jugular (Active)   Central Line Being Utilized Yes 6/12/2017  8:59 AM   Criteria for Appropriate Use Limited/no vessel suitable for conventional peripheral access 6/12/2017  8:59 AM   Site Assessment Clean, dry, & intact 6/12/2017  8:59 AM   Infiltration Assessment 0 6/12/2017  8:59 AM   Affected Extremity/Extremities Color distal to insertion site pink (or appropriate for race) 6/12/2017  8:59 AM   Date of Last Dressing Change 06/09/17 6/12/2017  8:59 AM   Dressing Status Clean;Dry;Loose 6/12/2017  8:59 AM   Dressing Type Disk with Chlorhexadine gluconate (CHG); Transparent 6/12/2017  8:59 AM   Action Taken Open ports on tubing capped 6/12/2017  8:59 AM   Proximal Hub Color/Line Status White; Infusing 6/12/2017  8:59 AM   Positive Blood Return (Medial Site) Yes 6/12/2017  8:59 AM   Medial 1 Hub Color/Line Status Araceli Birch; Infusing 6/12/2017  8:59 AM   Positive Blood Return (Lateral Site) Yes 6/12/2017  8:59 AM   Medial 2 Hub Color/Line Status Blue;Capped 6/12/2017  8:59 AM   Positive Blood Return (Site #3) Yes 6/12/2017  8:59 AM   Distal Hub Color/Line Status Brown;Capped 6/12/2017  8:59 AM   Positive Blood Return (Site #4) Yes 6/12/2017  8:59 AM   Alcohol Cap Used Yes 6/12/2017  8:59 AM       Peripheral IV 06/06/17 Right Hand (Active)   Site Assessment Clean, dry, & intact 6/12/2017  8:59 AM   Phlebitis Assessment 0 6/12/2017  8:59 AM   Infiltration Assessment 0 6/12/2017  8:59 AM   Dressing Status Clean, dry, & intact 6/12/2017  8:59 AM   Dressing Type Tape;Transparent 6/12/2017  8:59 AM   Hub Color/Line Status Yellow;Capped 6/12/2017  8:59 AM   Action Taken Open ports on tubing capped 6/11/2017  3:45 PM   Alcohol Cap Used Yes 6/12/2017  8:59 AM        Opportunity for questions and clarification was provided.       Patient transported with:   [employer]

## 2017-06-12 NOTE — PROGRESS NOTES
Bedside shift change report given to Ghazal Sher RN (oncoming nurse) by April, RN (offgoing nurse). Report included the following information SBAR, MAR, Accordion and Cardiac Rhythm NSR/ST.

## 2017-06-13 LAB
ANION GAP BLD CALC-SCNC: 10 MMOL/L (ref 5–15)
BUN SERPL-MCNC: 1 MG/DL (ref 6–20)
BUN/CREAT SERPL: 1 (ref 12–20)
CALCIUM SERPL-MCNC: 8.6 MG/DL (ref 8.5–10.1)
CHLORIDE SERPL-SCNC: 105 MMOL/L (ref 97–108)
CO2 SERPL-SCNC: 25 MMOL/L (ref 21–32)
CREAT SERPL-MCNC: 0.69 MG/DL (ref 0.55–1.02)
GLUCOSE SERPL-MCNC: 85 MG/DL (ref 65–100)
MAGNESIUM SERPL-MCNC: 1.5 MG/DL (ref 1.6–2.4)
PHOSPHATE SERPL-MCNC: 3.8 MG/DL (ref 2.6–4.7)
POTASSIUM SERPL-SCNC: 4 MMOL/L (ref 3.5–5.1)
SODIUM SERPL-SCNC: 140 MMOL/L (ref 136–145)

## 2017-06-13 PROCEDURE — 74011250637 HC RX REV CODE- 250/637: Performed by: SURGERY

## 2017-06-13 PROCEDURE — 74011250636 HC RX REV CODE- 250/636: Performed by: INTERNAL MEDICINE

## 2017-06-13 PROCEDURE — 74011250637 HC RX REV CODE- 250/637: Performed by: NURSE PRACTITIONER

## 2017-06-13 PROCEDURE — 36415 COLL VENOUS BLD VENIPUNCTURE: CPT | Performed by: SURGERY

## 2017-06-13 PROCEDURE — 74011250636 HC RX REV CODE- 250/636: Performed by: SURGERY

## 2017-06-13 PROCEDURE — 84100 ASSAY OF PHOSPHORUS: CPT | Performed by: SURGERY

## 2017-06-13 PROCEDURE — 65210000002 HC RM PRIVATE GYN

## 2017-06-13 PROCEDURE — 83735 ASSAY OF MAGNESIUM: CPT | Performed by: SURGERY

## 2017-06-13 PROCEDURE — 80048 BASIC METABOLIC PNL TOTAL CA: CPT | Performed by: SURGERY

## 2017-06-13 PROCEDURE — 74011000250 HC RX REV CODE- 250: Performed by: INTERNAL MEDICINE

## 2017-06-13 RX ORDER — ALPRAZOLAM 1 MG/1
1 TABLET ORAL
Status: DISCONTINUED | OUTPATIENT
Start: 2017-06-13 | End: 2017-06-21 | Stop reason: HOSPADM

## 2017-06-13 RX ORDER — MAGNESIUM SULFATE 1 G/100ML
1 INJECTION INTRAVENOUS
Status: COMPLETED | OUTPATIENT
Start: 2017-06-13 | End: 2017-06-14

## 2017-06-13 RX ADMIN — MORPHINE SULFATE 60 MG: 60 TABLET, FILM COATED, EXTENDED RELEASE ORAL at 21:57

## 2017-06-13 RX ADMIN — SODIUM CHLORIDE 10 MG: 9 INJECTION INTRAMUSCULAR; INTRAVENOUS; SUBCUTANEOUS at 18:19

## 2017-06-13 RX ADMIN — ONDANSETRON 4 MG: 2 INJECTION INTRAMUSCULAR; INTRAVENOUS at 12:30

## 2017-06-13 RX ADMIN — Medication 10 ML: at 07:05

## 2017-06-13 RX ADMIN — MAGNESIUM SULFATE HEPTAHYDRATE 1 G: 1 INJECTION, SOLUTION INTRAVENOUS at 14:16

## 2017-06-13 RX ADMIN — MORPHINE SULFATE 30 MG: 15 TABLET ORAL at 21:57

## 2017-06-13 RX ADMIN — ONDANSETRON 4 MG: 2 INJECTION INTRAMUSCULAR; INTRAVENOUS at 21:57

## 2017-06-13 RX ADMIN — ALPRAZOLAM 1 MG: 1 TABLET ORAL at 14:15

## 2017-06-13 RX ADMIN — MORPHINE SULFATE 30 MG: 15 TABLET ORAL at 10:59

## 2017-06-13 RX ADMIN — MORPHINE SULFATE 30 MG: 15 TABLET ORAL at 04:16

## 2017-06-13 RX ADMIN — POTASSIUM CHLORIDE 40 MEQ: 750 TABLET, FILM COATED, EXTENDED RELEASE ORAL at 09:32

## 2017-06-13 RX ADMIN — PANTOPRAZOLE SODIUM 40 MG: 40 TABLET, DELAYED RELEASE ORAL at 07:05

## 2017-06-13 RX ADMIN — MORPHINE SULFATE 60 MG: 60 TABLET, FILM COATED, EXTENDED RELEASE ORAL at 09:35

## 2017-06-13 RX ADMIN — ENOXAPARIN SODIUM 40 MG: 40 INJECTION SUBCUTANEOUS at 09:35

## 2017-06-13 RX ADMIN — ONDANSETRON 4 MG: 2 INJECTION INTRAMUSCULAR; INTRAVENOUS at 07:05

## 2017-06-13 RX ADMIN — MAGNESIUM SULFATE HEPTAHYDRATE 1 G: 1 INJECTION, SOLUTION INTRAVENOUS at 12:57

## 2017-06-13 RX ADMIN — MORPHINE SULFATE 60 MG: 60 TABLET, FILM COATED, EXTENDED RELEASE ORAL at 16:27

## 2017-06-13 RX ADMIN — ONDANSETRON 4 MG: 2 INJECTION INTRAMUSCULAR; INTRAVENOUS at 16:51

## 2017-06-13 RX ADMIN — Medication 10 ML: at 12:00

## 2017-06-13 NOTE — PROGRESS NOTES
[address]  [place], [address]       GI PROGRESS NOTE  Lesa Gaxiola [facility]  [phone]    NAME: Carlos Mayo   :  1975   MRN:  [id number]       Subjective:     Feeling very emotional, takes xanax at home. Feels better every day. Still with incisional pain and pain to the CHRIS site. Objective:     VITALS:   Last 24hrs VS reviewed since prior progress note. Most recent are:  Visit Vitals    BP (!) 149/102 (BP 1 Location: Left arm, BP Patient Position: At rest;Sitting)    Pulse (!) 107    Temp 98.6 °F (37 °C)    Resp 20    Ht 5' 4\" (1.626 m)    Wt 156 kg (343 lb 14.7 oz)    SpO2 95%    BMI 59.03 kg/m2       PHYSICAL EXAM:  General: Cooperative, no acute distress    Neurologic:  Alert and oriented X 3. HEENT: PERRLA, EOMI.    Lungs:  + cough  Heart:  s1 s2  Abdomen: Soft, Non distended, incision, BS +  Extremities: No edema  Psych:   Good insight. tearful    Lab Data Reviewed:     Recent Results (from the past 24 hour(s))   METABOLIC PANEL, BASIC    Collection Time: 17  4:20 AM   Result Value Ref Range    Sodium 140 136 - 145 mmol/L    Potassium 4.0 3.5 - 5.1 mmol/L    Chloride 105 97 - 108 mmol/L    CO2 25 21 - 32 mmol/L    Anion gap 10 5 - 15 mmol/L    Glucose 85 65 - 100 mg/dL    BUN 1 (L) 6 - 20 MG/DL    Creatinine 0.69 0.55 - 1.02 MG/DL    BUN/Creatinine ratio 1 (L) 12 - 20      GFR est AA >60 >60 ml/min/1.73m2    GFR est non-AA >60 >60 ml/min/1.73m2    Calcium 8.6 8.5 - 10.1 MG/DL   MAGNESIUM    Collection Time: 17  4:20 AM   Result Value Ref Range    Magnesium 1.5 (L) 1.6 - 2.4 mg/dL   PHOSPHORUS    Collection Time: 17  4:20 AM   Result Value Ref Range    Phosphorus 3.8 2.6 - 4.7 MG/DL         ________________________________________________________________________       Assessment:   · S/p surgery for small bowel perforation  · Crohn's disease     Patient Active Problem List   Diagnosis Code    Crohn's disease ([facility] K50.90  DVT (deep venous thrombosis) ([facility]) I82.409    Incarcerated ventral hernia K46.0    Right flank pain R10.9    Perforated bowel ([address] K63.1     Plan:   · Hopefully home in am  · Will add xanax prn     Signed By: Patricia Yao NP     6/13/2017  10:31 AM         GI Attending: Agree with above. Will follow. Appreciate Surgery team's care    Cain Cantor MD

## 2017-06-13 NOTE — PROGRESS NOTES
No complaints this AM. Tolerating diet. Pain well controlled. Tm 98.8 HR: 107 BP: 149/102 Resp Rate: 20 per minute 95% sat on room air. Intake/Output Summary (Last 24 hours) at 06/13/17 1020  Last data filed at 06/12/17 1733   Gross per 24 hour   Intake                0 ml   Output              150 ml   Net             -150 ml   Exam: Cor: RRR. Lungs: Bilat BS, Clear to auscultation. Abd: Soft, Non distended. Non tender. No associated rebound or guarding. Incision is clean. No further drainage. Serosanguinous drain output. Labs:   Recent Results (from the past 12 hour(s))   METABOLIC PANEL, BASIC    Collection Time: 06/13/17  4:20 AM   Result Value Ref Range    Sodium 140 136 - 145 mmol/L    Potassium 4.0 3.5 - 5.1 mmol/L    Chloride 105 97 - 108 mmol/L    CO2 25 21 - 32 mmol/L    Anion gap 10 5 - 15 mmol/L    Glucose 85 65 - 100 mg/dL    BUN 1 (L) 6 - 20 MG/DL    Creatinine 0.69 0.55 - 1.02 MG/DL    BUN/Creatinine ratio 1 (L) 12 - 20      GFR est AA >60 >60 ml/min/1.73m2    GFR est non-AA >60 >60 ml/min/1.73m2    Calcium 8.6 8.5 - 10.1 MG/DL   MAGNESIUM    Collection Time: 06/13/17  4:20 AM   Result Value Ref Range    Magnesium 1.5 (L) 1.6 - 2.4 mg/dL   PHOSPHORUS    Collection Time: 06/13/17  4:20 AM   Result Value Ref Range    Phosphorus 3.8 2.6 - 4.7 MG/DL   Diet as tolerated. Replace Mg 1.5. Labs in AM.  Continue pain meds as ordered. DVT Prophylaxis - Lovenox. Remove drain. GI following. OOB, Ambulate. Hopefully, home soon.

## 2017-06-13 NOTE — PROGRESS NOTES
Care Management Interventions  Mode of Transport at Discharge: Other (see comment) (private vehicle)  Discharge Durable Medical Equipment: No  Physical Therapy Consult: No  Occupational Therapy Consult: No  Speech Therapy Consult: No  Current Support Network:  (independent with ADLs)  Confirm Follow Up Transport: Family  Freedom of Choice Offered: Yes  Discharge Location  Discharge Placement: Home with home health    CM reviewed chart. Pt is independent with ADLs and IADLs. Pt lives in a private residence and does not use any assistive devices. Pt has supportive family. Pt's PCP is Dr. Devang Baeza and she gets her prescriptions filled at her local [address] Plan is to return home at time of discharge. Pt will likely need home health for new ostomy at time of discharge. CM will continue to follow and will await home health orders.   MOUNIKA Simmons, ACM

## 2017-06-14 LAB
ANION GAP BLD CALC-SCNC: 12 MMOL/L (ref 5–15)
BUN SERPL-MCNC: 3 MG/DL (ref 6–20)
BUN/CREAT SERPL: 5 (ref 12–20)
CALCIUM SERPL-MCNC: 8.6 MG/DL (ref 8.5–10.1)
CHLORIDE SERPL-SCNC: 100 MMOL/L (ref 97–108)
CO2 SERPL-SCNC: 28 MMOL/L (ref 21–32)
CREAT SERPL-MCNC: 0.57 MG/DL (ref 0.55–1.02)
GLUCOSE SERPL-MCNC: 96 MG/DL (ref 65–100)
MAGNESIUM SERPL-MCNC: 1.5 MG/DL (ref 1.6–2.4)
PHOSPHATE SERPL-MCNC: 4.5 MG/DL (ref 2.6–4.7)
POTASSIUM SERPL-SCNC: 3.4 MMOL/L (ref 3.5–5.1)
SODIUM SERPL-SCNC: 140 MMOL/L (ref 136–145)

## 2017-06-14 PROCEDURE — 74011250636 HC RX REV CODE- 250/636: Performed by: INTERNAL MEDICINE

## 2017-06-14 PROCEDURE — 74011250636 HC RX REV CODE- 250/636: Performed by: SURGERY

## 2017-06-14 PROCEDURE — 65210000002 HC RM PRIVATE GYN

## 2017-06-14 PROCEDURE — 80048 BASIC METABOLIC PNL TOTAL CA: CPT | Performed by: SURGERY

## 2017-06-14 PROCEDURE — 74011250637 HC RX REV CODE- 250/637: Performed by: SURGERY

## 2017-06-14 PROCEDURE — 83735 ASSAY OF MAGNESIUM: CPT | Performed by: SURGERY

## 2017-06-14 PROCEDURE — 74011000250 HC RX REV CODE- 250: Performed by: INTERNAL MEDICINE

## 2017-06-14 PROCEDURE — 84100 ASSAY OF PHOSPHORUS: CPT | Performed by: SURGERY

## 2017-06-14 PROCEDURE — 36415 COLL VENOUS BLD VENIPUNCTURE: CPT | Performed by: SURGERY

## 2017-06-14 RX ADMIN — MORPHINE SULFATE 30 MG: 15 TABLET ORAL at 20:26

## 2017-06-14 RX ADMIN — MORPHINE SULFATE 60 MG: 60 TABLET, FILM COATED, EXTENDED RELEASE ORAL at 22:57

## 2017-06-14 RX ADMIN — LORAZEPAM 1 MG: 2 INJECTION INTRAMUSCULAR; INTRAVENOUS at 16:19

## 2017-06-14 RX ADMIN — SODIUM CHLORIDE 5 MG: 9 INJECTION INTRAMUSCULAR; INTRAVENOUS; SUBCUTANEOUS at 14:04

## 2017-06-14 RX ADMIN — MORPHINE SULFATE 30 MG: 15 TABLET ORAL at 07:01

## 2017-06-14 RX ADMIN — PANTOPRAZOLE SODIUM 40 MG: 40 TABLET, DELAYED RELEASE ORAL at 07:01

## 2017-06-14 RX ADMIN — MORPHINE SULFATE 60 MG: 60 TABLET, FILM COATED, EXTENDED RELEASE ORAL at 11:00

## 2017-06-14 RX ADMIN — ONDANSETRON 4 MG: 2 INJECTION INTRAMUSCULAR; INTRAVENOUS at 11:00

## 2017-06-14 RX ADMIN — Medication 10 ML: at 22:00

## 2017-06-14 RX ADMIN — ONDANSETRON 4 MG: 2 INJECTION INTRAMUSCULAR; INTRAVENOUS at 04:09

## 2017-06-14 RX ADMIN — Medication 10 ML: at 02:29

## 2017-06-14 RX ADMIN — Medication 10 ML: at 00:14

## 2017-06-14 RX ADMIN — SODIUM CHLORIDE 10 MG: 9 INJECTION INTRAMUSCULAR; INTRAVENOUS; SUBCUTANEOUS at 07:01

## 2017-06-14 RX ADMIN — POTASSIUM CHLORIDE 40 MEQ: 750 TABLET, FILM COATED, EXTENDED RELEASE ORAL at 18:00

## 2017-06-14 RX ADMIN — POTASSIUM CHLORIDE 40 MEQ: 750 TABLET, FILM COATED, EXTENDED RELEASE ORAL at 10:59

## 2017-06-14 RX ADMIN — Medication 10 ML: at 07:01

## 2017-06-14 RX ADMIN — SODIUM CHLORIDE 10 MG: 9 INJECTION INTRAMUSCULAR; INTRAVENOUS; SUBCUTANEOUS at 02:29

## 2017-06-14 RX ADMIN — MORPHINE SULFATE 60 MG: 60 TABLET, FILM COATED, EXTENDED RELEASE ORAL at 16:17

## 2017-06-14 RX ADMIN — ENOXAPARIN SODIUM 40 MG: 40 INJECTION SUBCUTANEOUS at 10:59

## 2017-06-14 RX ADMIN — SODIUM CHLORIDE 10 MG: 9 INJECTION INTRAMUSCULAR; INTRAVENOUS; SUBCUTANEOUS at 21:04

## 2017-06-14 RX ADMIN — Medication 10 ML: at 14:09

## 2017-06-14 RX ADMIN — LORAZEPAM 1 MG: 2 INJECTION INTRAMUSCULAR; INTRAVENOUS at 00:13

## 2017-06-14 RX ADMIN — Medication 10 ML: at 12:00

## 2017-06-14 NOTE — PROGRESS NOTES
As per NP note. Ms. Miguel Dykes reports nausea this PM.   Tm 100.2 Tc 98.7 HR: 108 BP: 138/82 Resp Rate: 18 per minute 91% sat on room air. Exam: Cor - RRR. Lungs - Bilateral breath sounds. Clear to auscultation. Abdomen - Soft. Tender. No rebound or guarding. Incision is clean. There is output in ileostomy bag. Clear liquid diet as tolerated. Needs to be OOB. Pain medication as ordered. Compazine and Zofran for nausea. GI following. Will hold off on discharge for now.

## 2017-06-14 NOTE — PROGRESS NOTES
General Surgery Daily Progress Note    Admit Date: 2017  Post-Operative Day: 8 Days Post-Op from Procedure(s):  Diagnostic Laparoscopy, converted to open Exploratory Laparotomy, Lysis of adhesions for 7 hours/ Intraoperative Upper Endoscopy by Dr. Heena Hernandez. Subjective:     Last 24 hrs: Pt c/o nausea. Says she hasn't had ileostomy output in 2 days until about 5hrs ago when is started putting out. She gets early satiety as well  Objective:     Blood pressure 138/82, pulse (!) 108, temperature 98.7 °F (37.1 °C), resp. rate 18, height 5' 4\" (1.626 m), weight 343 lb 14.7 oz (156 kg), last menstrual period 2017, SpO2 91 %. Temp (24hrs), Av.2 °F (37.3 °C), Min:98.6 °F (37 °C), Max:100.2 °F (37.9 °C)      _____________________  Physical Exam:     Alert and Oriented, x3, in no acute distress. Cardiovascular: RRR, no peripheral edema  Lungs:CTAB   Abdomen: soft, nl BS, ileostomy w/ soft stool      Assessment:   Active Problems:    Perforated bowel ([address] (2017)            Plan:     Phenergan added by Dr Yuong Miguel  Will monitor and hold on d/c   Cont diet    Data Review:    Recent Labs      17   0410   WBC  13.7*   HGB  11.3*   HCT  34.5*   PLT  206     Recent Labs      17   0416  17   0420  17   0410   NA  140  140  140   K  3.4*  4.0  3.4*   CL  100  105  106   CO2  28  25  26   GLU  96  85  73   BUN  3*  1*  1*   CREA  0.57  0.69  0.52*   CA  8.6  8.6  8.2*   MG  1.5*  1.5*  1.6   PHOS  4.5  3.8  2.8   ALB   --    --   2.0*   SGOT   --    --   13*   ALT   --    --   23     No results for input(s): AML, LPSE in the last 72 hours.         ______________________  Medications:    Current Facility-Administered Medications   Medication Dose Route Frequency    ALPRAZolam (XANAX) tablet 1 mg  1 mg Oral Q8H PRN    potassium chloride SR (KLOR-CON 10) tablet 40 mEq  40 mEq Oral BID    morphine CR (MS CONTIN) tablet 60 mg  60 mg Oral TID    morphine IR (MS IR) tablet 30 mg  30 mg Oral Q4H PRN    sodium chloride (NS) flush 20 mL  20 mL InterCATHeter PRN    sodium chloride (NS) flush 10 mL  10 mL InterCATHeter Q24H    sodium chloride (NS) flush 10 mL  10 mL InterCATHeter PRN    sodium chloride (NS) flush 10 mL  10 mL InterCATHeter Q8H    alteplase (CATHFLO) 1 mg in sterile water (preservative free) 1 mL injection  1 mg InterCATHeter PRN    bacitracin 500 unit/gram packet 1 Packet  1 Packet Topical PRN    pantoprazole (PROTONIX) tablet 40 mg  40 mg Oral ACB    enoxaparin (LOVENOX) injection 40 mg  40 mg SubCUTAneous DAILY    LORazepam (ATIVAN) injection 1 mg  1 mg IntraVENous Q6H PRN    ondansetron (ZOFRAN) injection 4 mg  4 mg IntraVENous Q4H PRN    albuterol (PROVENTIL VENTOLIN) nebulizer solution 2.5 mg  2.5 mg Nebulization Q4H PRN    prochlorperazine (COMPAZINE) with saline injection 10 mg  10 mg IntraVENous Q6H PRN    chlorproMAZINE (THORAZINE) 25 mg in 0.9% sodium chloride 50 mL ivpb  25 mg IntraVENous Q4H PRN       Pastor Davenport NP  2017

## 2017-06-14 NOTE — PROGRESS NOTES
Bedside and Verbal shift change report given to arun rn (oncoming nurse) by Faviola Lee RN (offgoing nurse). Report included the following information SBAR, Intake/Output, MAR, Accordion and Recent Results.

## 2017-06-14 NOTE — PROGRESS NOTES
NUTRITION COMPLETE ASSESSMENT    RECOMMENDATIONS:   1. Diet per surgery (continue ONS as tolerated to optimize intake for post-op healing)  2. Consider resumption of colace (part of home regimen along with relistor)   3. Weekly weight  4. Consider daily MVI     Interventions/Plan:   Food/Nutrient Delivery: Ensure Enlive TID    Assessment:   Reason for Assessment:   [x]LOS     Diet:  Clear Liquids  Nutritionally Significant Medications: [x] Reviewed & Includes: ativan q 6 hours prn; MS contin 3x/day; morphine q 4 hours prn; zofran q4 hours prn; potassium chloride 40 mEq BID; compazine q6 hours prn  Meal Intake: Patient Vitals for the past 100 hrs:   % Diet Eaten   06/13/17 0935 100 %   06/10/17 1200 10 %     Subjective:  Pt tearful about having to wear a diaper for leakage when she walks around the unit. She states she was eating throughout the day at home, including Ensure supplements (2-3 per day). Objective:  Chart reviewed, discussed with RN. Pt admitted with perforated bowel. PMHx: Crohn's, colectomy with ileostomy, others noted. Pt POD 8 from exploratory lap, extensive KATHRINE. NGT discontinued 6/9. She has had a post-op ileus and diet progression has been slow. Per RN who had her yesterday, she ate throughout the day until the evening when she started vomiting. Diet downgraded to clear liquids. Will send Ensure Enlive TID (pt agreeable and states she was told she could try and eat whatever she wanted). This will provide 1050 kcal, 60 g protein. Pt usually drinks 1-3 Ensure at home and eats soft solids at home. She also takes colace and relistor bowel regimen. Ileostomy output placed in toilet for RN eval.    Estimated Nutrition Needs:   Kcals/day: 2220 Kcals/day (2889-2213 kcal/day [facility]. Jeor x 1-1.2))  Protein: 82 g ( g/day (1.5-2 g/kg IBW))  Fluid: 2300 ml (1 mL/kcal)     Based On:  Kristen Rutledge  Weight Used: Actual wt (156 kg)    Pt expected to meet estimated nutrient needs:  [x]   Yes    Nutrition Diagnosis:   1. Inadequate protein-energy intake related to altered GI function as evidenced by liquid diet x 6 of 8 hospital days    Goals:     Pt to consume at least 3 supplements per day over the next 5-7 days     Monitoring & Evaluation:    - Total energy intake, Liquid meal replacement    Previous Nutrition Goals Met:  N/A  Previous Recommendations:      N/A    Education & Discharge Needs:   [] None Identified   [x] Identified and addressed    [x] Participated in care plan, discharge planning, and/or interdisciplinary rounds        Cultural, Orthodox and ethnic food preferences identified:  NONE      Skin Integrity: []Intact  [x]Other: ileostomy  Edema: []None [x]Other: LLE, RLE 1+  Last BM: 6/13/17  Food Allergies: [x]None []Other    Anthropometrics:    Weight Loss Metrics 6/12/2017 6/6/2017 8/22/2011   Today's Wt 343 lb 14.7 oz - 372 lb   BMI - 59.03 kg/m2 63.82 kg/m2      Last 3 Recorded Weights in this Encounter    06/10/17 0051 06/11/17 0045 06/12/17 0626   Weight: 150.8 kg (332 lb 7.3 oz) 151.3 kg (333 lb 8.9 oz) 156 kg (343 lb 14.7 oz)      Weight Source: Standing scale (comment)  Height: 5' 4\" (162.6 cm),    Body mass index is 59.03 kg/(m^2).   IBW : 54.4 kg (120 lb),    Usual Body Weight: 168.7 kg (372 lb) (2011),      Labs:  Lab Results   Component Value Date/Time    Sodium 140 06/14/2017 04:16 AM    Potassium 3.4 06/14/2017 04:16 AM    Chloride 100 06/14/2017 04:16 AM    CO2 28 06/14/2017 04:16 AM    Glucose 96 06/14/2017 04:16 AM    BUN 3 06/14/2017 04:16 AM    Creatinine 0.57 06/14/2017 04:16 AM    Calcium 8.6 06/14/2017 04:16 AM    Magnesium 1.5 06/14/2017 04:16 AM    Phosphorus 4.5 06/14/2017 04:16 AM    Albumin 2.0 06/12/2017 04:10 AM     No results found for: HBA1C, HGBE8, OIT0XFDS, LOW3DRAP  Lab Results   Component Value Date/Time    Glucose 96 06/14/2017 04:16 AM    Glucose (POC) 171 06/06/2017 10:44 PM      Lab Results   Component Value Date/Time    ALT (SGPT) 23 06/12/2017 04:10 AM    AST (SGOT) 13 06/12/2017 04:10 AM    Alk.  phosphatase 48 06/12/2017 04:10 AM    Bilirubin, direct 0.1 07/07/2009 06:38 PM    Bilirubin, total 0.6 06/12/2017 04:10 AM      Katty Valenzuela, [address] [facility]

## 2017-06-14 NOTE — PROGRESS NOTES
[address]  [address], [address]       GI PROGRESS NOTE  Nora Shelter ACNP  [phone]    NAME: Cristina Gibbs   :  1975   MRN:  [id number]       Subjective:     Complaining of nausea    Objective:     VITALS:   Last 24hrs VS reviewed since prior progress note. Most recent are:  Visit Vitals    /82 (BP 1 Location: Left arm, BP Patient Position: At rest)    Pulse (!) 108    Temp 98.7 °F (37.1 °C)    Resp 18    Ht 5' 4\" (1.626 m)    Wt 156 kg (343 lb 14.7 oz)    SpO2 91%    BMI 59.03 kg/m2       PHYSICAL EXAM:  General: Cooperative, no acute distress    Neurologic:  Alert and oriented X 3. HEENT: PERRLA, EOMI.    Lungs:  + cough  Heart:  s1 s2  Abdomen: Soft, Non distended, incision, BS +  Extremities: No edema  Psych:   Good insight. tearful    Lab Data Reviewed:     Recent Results (from the past 24 hour(s))   METABOLIC PANEL, BASIC    Collection Time: 17  4:16 AM   Result Value Ref Range    Sodium 140 136 - 145 mmol/L    Potassium 3.4 (L) 3.5 - 5.1 mmol/L    Chloride 100 97 - 108 mmol/L    CO2 28 21 - 32 mmol/L    Anion gap 12 5 - 15 mmol/L    Glucose 96 65 - 100 mg/dL    BUN 3 (L) 6 - 20 MG/DL    Creatinine 0.57 0.55 - 1.02 MG/DL    BUN/Creatinine ratio 5 (L) 12 - 20      GFR est AA >60 >60 ml/min/1.73m2    GFR est non-AA >60 >60 ml/min/1.73m2    Calcium 8.6 8.5 - 10.1 MG/DL   MAGNESIUM    Collection Time: 17  4:16 AM   Result Value Ref Range    Magnesium 1.5 (L) 1.6 - 2.4 mg/dL   PHOSPHORUS    Collection Time: 17  4:16 AM   Result Value Ref Range    Phosphorus 4.5 2.6 - 4.7 MG/DL         ________________________________________________________________________       Assessment:   · S/p surgery for small bowel perforation  · Crohn's disease  · Nausea      Patient Active Problem List   Diagnosis Code    Crohn's disease ([facility] K50.90    DVT (deep venous thrombosis) ([facility]) I82.409    Incarcerated ventral hernia K46.0    Right flank pain R10.9    Perforated bowel ([facility] K63.1     Plan:   · Add phenergan PRN today  · Would consider deferring discharge if nausea does not improve  · Appreciate Surgery team's care       Cain Hurt MD

## 2017-06-15 ENCOUNTER — APPOINTMENT (OUTPATIENT)
Dept: GENERAL RADIOLOGY | Age: 42
DRG: 908 | End: 2017-06-15
Payer: MEDICARE

## 2017-06-15 PROCEDURE — 74011250636 HC RX REV CODE- 250/636: Performed by: SURGERY

## 2017-06-15 PROCEDURE — 74011250636 HC RX REV CODE- 250/636: Performed by: INTERNAL MEDICINE

## 2017-06-15 PROCEDURE — 74011250637 HC RX REV CODE- 250/637: Performed by: SURGERY

## 2017-06-15 PROCEDURE — 74011250637 HC RX REV CODE- 250/637: Performed by: NURSE PRACTITIONER

## 2017-06-15 PROCEDURE — 74011000250 HC RX REV CODE- 250: Performed by: INTERNAL MEDICINE

## 2017-06-15 PROCEDURE — 74011250636 HC RX REV CODE- 250/636: Performed by: NURSE PRACTITIONER

## 2017-06-15 PROCEDURE — 65210000002 HC RM PRIVATE GYN

## 2017-06-15 PROCEDURE — 74000 XR ABD (KUB): CPT

## 2017-06-15 RX ORDER — DEXTROMETHORPHAN/PSEUDOEPHED 2.5-7.5/.8
20 DROPS ORAL
Status: DISCONTINUED | OUTPATIENT
Start: 2017-06-15 | End: 2017-06-21 | Stop reason: HOSPADM

## 2017-06-15 RX ORDER — SODIUM CHLORIDE, SODIUM LACTATE, POTASSIUM CHLORIDE, CALCIUM CHLORIDE 600; 310; 30; 20 MG/100ML; MG/100ML; MG/100ML; MG/100ML
75 INJECTION, SOLUTION INTRAVENOUS CONTINUOUS
Status: DISCONTINUED | OUTPATIENT
Start: 2017-06-15 | End: 2017-06-21 | Stop reason: HOSPADM

## 2017-06-15 RX ORDER — METOCLOPRAMIDE HYDROCHLORIDE 5 MG/ML
5 INJECTION INTRAMUSCULAR; INTRAVENOUS EVERY 6 HOURS
Status: DISCONTINUED | OUTPATIENT
Start: 2017-06-15 | End: 2017-06-21 | Stop reason: HOSPADM

## 2017-06-15 RX ORDER — POTASSIUM CHLORIDE 7.45 MG/ML
10 INJECTION INTRAVENOUS
Status: DISPENSED | OUTPATIENT
Start: 2017-06-15 | End: 2017-06-15

## 2017-06-15 RX ADMIN — SODIUM CHLORIDE 10 MG: 9 INJECTION INTRAMUSCULAR; INTRAVENOUS; SUBCUTANEOUS at 19:15

## 2017-06-15 RX ADMIN — ONDANSETRON 4 MG: 2 INJECTION INTRAMUSCULAR; INTRAVENOUS at 21:01

## 2017-06-15 RX ADMIN — MORPHINE SULFATE 30 MG: 15 TABLET ORAL at 01:13

## 2017-06-15 RX ADMIN — POTASSIUM CHLORIDE 40 MEQ: 750 TABLET, FILM COATED, EXTENDED RELEASE ORAL at 10:17

## 2017-06-15 RX ADMIN — MORPHINE SULFATE 60 MG: 60 TABLET, FILM COATED, EXTENDED RELEASE ORAL at 10:17

## 2017-06-15 RX ADMIN — ALPRAZOLAM 1 MG: 1 TABLET ORAL at 17:47

## 2017-06-15 RX ADMIN — SODIUM CHLORIDE 10 MG: 9 INJECTION INTRAMUSCULAR; INTRAVENOUS; SUBCUTANEOUS at 06:30

## 2017-06-15 RX ADMIN — METOCLOPRAMIDE 5 MG: 5 INJECTION, SOLUTION INTRAMUSCULAR; INTRAVENOUS at 17:33

## 2017-06-15 RX ADMIN — SODIUM CHLORIDE, SODIUM LACTATE, POTASSIUM CHLORIDE, AND CALCIUM CHLORIDE 125 ML/HR: 600; 310; 30; 20 INJECTION, SOLUTION INTRAVENOUS at 17:48

## 2017-06-15 RX ADMIN — Medication 10 ML: at 22:00

## 2017-06-15 RX ADMIN — Medication 10 ML: at 10:13

## 2017-06-15 RX ADMIN — ENOXAPARIN SODIUM 40 MG: 40 INJECTION SUBCUTANEOUS at 10:18

## 2017-06-15 RX ADMIN — ONDANSETRON 4 MG: 2 INJECTION INTRAMUSCULAR; INTRAVENOUS at 01:17

## 2017-06-15 RX ADMIN — ONDANSETRON 4 MG: 2 INJECTION INTRAMUSCULAR; INTRAVENOUS at 10:29

## 2017-06-15 RX ADMIN — POTASSIUM CHLORIDE 10 MEQ: 10 INJECTION, SOLUTION INTRAVENOUS at 19:15

## 2017-06-15 RX ADMIN — MORPHINE SULFATE 60 MG: 60 TABLET, FILM COATED, EXTENDED RELEASE ORAL at 15:58

## 2017-06-15 RX ADMIN — MORPHINE SULFATE 30 MG: 15 TABLET ORAL at 21:01

## 2017-06-15 RX ADMIN — SODIUM CHLORIDE 10 MG: 9 INJECTION INTRAMUSCULAR; INTRAVENOUS; SUBCUTANEOUS at 13:02

## 2017-06-15 RX ADMIN — Medication 10 ML: at 10:18

## 2017-06-15 RX ADMIN — SODIUM CHLORIDE, SODIUM LACTATE, POTASSIUM CHLORIDE, AND CALCIUM CHLORIDE 500 ML: 600; 310; 30; 20 INJECTION, SOLUTION INTRAVENOUS at 15:58

## 2017-06-15 RX ADMIN — POTASSIUM CHLORIDE 10 MEQ: 10 INJECTION, SOLUTION INTRAVENOUS at 17:32

## 2017-06-15 RX ADMIN — Medication 10 ML: at 13:03

## 2017-06-15 RX ADMIN — Medication 10 ML: at 06:00

## 2017-06-15 RX ADMIN — ONDANSETRON 4 MG: 2 INJECTION INTRAMUSCULAR; INTRAVENOUS at 15:16

## 2017-06-15 RX ADMIN — SODIUM CHLORIDE, SODIUM LACTATE, POTASSIUM CHLORIDE, AND CALCIUM CHLORIDE 125 ML/HR: 600; 310; 30; 20 INJECTION, SOLUTION INTRAVENOUS at 21:00

## 2017-06-15 RX ADMIN — POTASSIUM CHLORIDE 10 MEQ: 10 INJECTION, SOLUTION INTRAVENOUS at 18:00

## 2017-06-15 NOTE — PROGRESS NOTES
Bedside and Verbal shift change report given to MAURI Mares (oncoming nurse) by Rafael Francisco (offgoing nurse). Report included the following information SBAR, Kardex, Intake/Output, MAR, Accordion and Recent Results.

## 2017-06-15 NOTE — PROGRESS NOTES
[address]  [address], [address]       GI PROGRESS NOTE  Vanessa Gonzalez [facility]  [phone]    NAME: Marquis Dumont   :  1975   MRN:  [id number]       Subjective:     Nausea, vomited x 2 overnight    Objective:     VITALS:   Last 24hrs VS reviewed since prior progress note. Most recent are:  Visit Vitals    BP (!) 135/91 (BP 1 Location: Left arm, BP Patient Position: At rest)    Pulse (!) 105    Temp 99.3 °F (37.4 °C)    Resp 17    Ht 5' 4\" (1.626 m)    Wt 156 kg (343 lb 14.7 oz)    SpO2 93%    BMI 59.03 kg/m2       PHYSICAL EXAM:  General: Cooperative  Neurologic:  Alert and oriented X 3. HEENT: PERRLA, EOMI. Lungs:  Cough remains but less  Heart:  s1 s2, tachy  Abdomen: Soft, Non distended, tender  Psych:   Good insight. Lab Data Reviewed:     No results found for this or any previous visit (from the past 24 hour(s)). ________________________________________________________________________       Assessment:   · S/p small bowel perforation and exploratory laparotomy. Nausea and vomiting started yesterday. She is feeling poorly. Pain is controlled but severe at times. Patient Active Problem List   Diagnosis Code    Crohn's disease ([facility] K50.90    DVT (deep venous thrombosis) ([facility] I82.409    Incarcerated ventral hernia K46.0    Right flank pain R10.9    Perforated bowel ([facility] K63.1     Plan:   · Discussed with Dr Abdullahi Asher, labs in am, reglan, hydration  · Will follow up tomorrow     Signed By: Steve Akhtar NP     6/15/2017  2:56 PM         GI Attending: Patient seen and examined. She is complaining of nausea despite anti-emetics. KUB is unremarkable. Symptoms are concerning for ileus. Ileostomy with 700 cc output in last 24 hours. That is increased from days prior. Agree with medical management. Will continue to hold off on restarting Crohn's disease management at this time. I will be out of the hospital tomorrow.  Dr. Collette Grams to cover in my absence. Appreciate Surgery team's care. Cain Vigil MD

## 2017-06-15 NOTE — PROGRESS NOTES
[address] [facility] Surgery    POD #9    Subjective     Not taking PO much today, a lot of nausea, no vomiting, ostomy with a little out    Objective     Patient Vitals for the past 24 hrs:   Temp Pulse Resp BP SpO2   06/15/17 1405 99.3 °F (37.4 °C) (!) 105 17 (!) 135/91 93 %   06/15/17 0829 98.4 °F (36.9 °C) (!) 106 17 134/90 92 %   06/15/17 0124 99 °F (37.2 °C) 100 18 144/79 92 %   06/14/17 2031 99.7 °F (37.6 °C) (!) 101 18 (!) 141/97 93 %   06/14/17 1530 99.4 °F (37.4 °C) 100 17 (!) 150/96 93 %         Date 06/14/17 0700 - 06/15/17 0659 06/15/17 0700 - 06/16/17 0659   Shift 7414-7317 0517-6488 24 Hour Total 6915-7675 3549-2251 24 Hour Total   I  N  T  A  K  E   P.O. 240  240         P. O. 240  240       Shift Total  (mL/kg) 240  (1.5)  240  (1.5)      O  U  T  P  U  T   Urine  (mL/kg/hr) 500  (0.3) 800  (0.4) 1300  (0.3)         Urine Voided        Stool 150 550 700         Output (ml) (Ileostomy 06/07/00 Lower  Abdomen) 150 550 700       Shift Total  (mL/kg) 650  (4.2) 1350  (8.7) 2000  (12.8)      NET -410 1350 -1760      Weight (kg) 156 156 156 156 156 156       PE  Pulm - CTAB  CV - RRR  Abd - soft, ND, BS Present, incision with mild erythema, no drainage    Labs  ,  Lab Results   Component Value Date/Time    Sodium 140 06/14/2017 04:16 AM    Potassium 3.4 06/14/2017 04:16 AM    Chloride 100 06/14/2017 04:16 AM    CO2 28 06/14/2017 04:16 AM    Anion gap 12 06/14/2017 04:16 AM    Glucose 96 06/14/2017 04:16 AM    BUN 3 06/14/2017 04:16 AM    Creatinine 0.57 06/14/2017 04:16 AM    BUN/Creatinine ratio 5 06/14/2017 04:16 AM    GFR est AA >60 06/14/2017 04:16 AM    GFR est non-AA >60 06/14/2017 04:16 AM    Calcium 8.6 06/14/2017 04:16 AM         Assessment     Octavia Gao is a 46 y. o.yr old female s/p Diagnostic Laparoscopy, converted to open Exploratory Laparotomy, Lysis of adhesions for 7 hours/ Intraoperative Upper Endoscopy by  Rene Em     Having a lot of nausea and not taking much PO today  Restart IVF today  Cont on liquids for now, will not advance  Give KCl today, K low yesterday  Start simethicone  Check am labs tomorrow  Likely has some ileus  Follow up on Caro Paulson MD

## 2017-06-16 LAB
ANION GAP BLD CALC-SCNC: 9 MMOL/L (ref 5–15)
BUN SERPL-MCNC: 3 MG/DL (ref 6–20)
BUN/CREAT SERPL: 6 (ref 12–20)
CALCIUM SERPL-MCNC: 8.3 MG/DL (ref 8.5–10.1)
CHLORIDE SERPL-SCNC: 101 MMOL/L (ref 97–108)
CO2 SERPL-SCNC: 28 MMOL/L (ref 21–32)
CREAT SERPL-MCNC: 0.54 MG/DL (ref 0.55–1.02)
ERYTHROCYTE [DISTWIDTH] IN BLOOD BY AUTOMATED COUNT: 14.4 % (ref 11.5–14.5)
GLUCOSE SERPL-MCNC: 72 MG/DL (ref 65–100)
HCT VFR BLD AUTO: 30.1 % (ref 35–47)
HGB BLD-MCNC: 9.7 G/DL (ref 11.5–16)
MAGNESIUM SERPL-MCNC: 1.4 MG/DL (ref 1.6–2.4)
MCH RBC QN AUTO: 30.1 PG (ref 26–34)
MCHC RBC AUTO-ENTMCNC: 32.2 G/DL (ref 30–36.5)
MCV RBC AUTO: 93.5 FL (ref 80–99)
PHOSPHATE SERPL-MCNC: 3 MG/DL (ref 2.6–4.7)
PLATELET # BLD AUTO: 261 K/UL (ref 150–400)
POTASSIUM SERPL-SCNC: 4.4 MMOL/L (ref 3.5–5.1)
RBC # BLD AUTO: 3.22 M/UL (ref 3.8–5.2)
SODIUM SERPL-SCNC: 138 MMOL/L (ref 136–145)
WBC # BLD AUTO: 14.6 K/UL (ref 3.6–11)

## 2017-06-16 PROCEDURE — 74011250636 HC RX REV CODE- 250/636: Performed by: SURGERY

## 2017-06-16 PROCEDURE — 74011250637 HC RX REV CODE- 250/637: Performed by: SURGERY

## 2017-06-16 PROCEDURE — 65210000002 HC RM PRIVATE GYN

## 2017-06-16 PROCEDURE — 36415 COLL VENOUS BLD VENIPUNCTURE: CPT | Performed by: SURGERY

## 2017-06-16 PROCEDURE — 80048 BASIC METABOLIC PNL TOTAL CA: CPT | Performed by: SURGERY

## 2017-06-16 PROCEDURE — 74011250637 HC RX REV CODE- 250/637: Performed by: PHYSICIAN ASSISTANT

## 2017-06-16 PROCEDURE — 83735 ASSAY OF MAGNESIUM: CPT | Performed by: SURGERY

## 2017-06-16 PROCEDURE — 85027 COMPLETE CBC AUTOMATED: CPT | Performed by: SURGERY

## 2017-06-16 PROCEDURE — 74011000250 HC RX REV CODE- 250: Performed by: INTERNAL MEDICINE

## 2017-06-16 PROCEDURE — 74011250636 HC RX REV CODE- 250/636: Performed by: NURSE PRACTITIONER

## 2017-06-16 PROCEDURE — 74011250636 HC RX REV CODE- 250/636: Performed by: INTERNAL MEDICINE

## 2017-06-16 PROCEDURE — 84100 ASSAY OF PHOSPHORUS: CPT | Performed by: SURGERY

## 2017-06-16 RX ORDER — MAGNESIUM SULFATE 1 G/100ML
1 INJECTION INTRAVENOUS ONCE
Status: COMPLETED | OUTPATIENT
Start: 2017-06-16 | End: 2017-06-20

## 2017-06-16 RX ORDER — ONDANSETRON 4 MG/1
4 TABLET, ORALLY DISINTEGRATING ORAL
Status: DISCONTINUED | OUTPATIENT
Start: 2017-06-16 | End: 2017-06-21 | Stop reason: HOSPADM

## 2017-06-16 RX ADMIN — MORPHINE SULFATE 60 MG: 60 TABLET, FILM COATED, EXTENDED RELEASE ORAL at 22:34

## 2017-06-16 RX ADMIN — METOCLOPRAMIDE 5 MG: 5 INJECTION, SOLUTION INTRAMUSCULAR; INTRAVENOUS at 00:30

## 2017-06-16 RX ADMIN — PANTOPRAZOLE SODIUM 40 MG: 40 TABLET, DELAYED RELEASE ORAL at 08:14

## 2017-06-16 RX ADMIN — METOCLOPRAMIDE 5 MG: 5 INJECTION, SOLUTION INTRAMUSCULAR; INTRAVENOUS at 06:05

## 2017-06-16 RX ADMIN — MORPHINE SULFATE 30 MG: 15 TABLET ORAL at 06:05

## 2017-06-16 RX ADMIN — SODIUM CHLORIDE 10 MG: 9 INJECTION INTRAMUSCULAR; INTRAVENOUS; SUBCUTANEOUS at 12:43

## 2017-06-16 RX ADMIN — ONDANSETRON 4 MG: 2 INJECTION INTRAMUSCULAR; INTRAVENOUS at 09:44

## 2017-06-16 RX ADMIN — MAGNESIUM SULFATE HEPTAHYDRATE 1 G: 1 INJECTION, SOLUTION INTRAVENOUS at 10:36

## 2017-06-16 RX ADMIN — SODIUM CHLORIDE, SODIUM LACTATE, POTASSIUM CHLORIDE, AND CALCIUM CHLORIDE 125 ML/HR: 600; 310; 30; 20 INJECTION, SOLUTION INTRAVENOUS at 08:15

## 2017-06-16 RX ADMIN — Medication 10 ML: at 22:34

## 2017-06-16 RX ADMIN — MORPHINE SULFATE 30 MG: 15 TABLET ORAL at 22:34

## 2017-06-16 RX ADMIN — MORPHINE SULFATE 30 MG: 15 TABLET ORAL at 18:33

## 2017-06-16 RX ADMIN — MORPHINE SULFATE 60 MG: 60 TABLET, FILM COATED, EXTENDED RELEASE ORAL at 10:36

## 2017-06-16 RX ADMIN — ONDANSETRON 4 MG: 2 INJECTION INTRAMUSCULAR; INTRAVENOUS at 06:05

## 2017-06-16 RX ADMIN — ONDANSETRON 4 MG: 4 TABLET, ORALLY DISINTEGRATING ORAL at 22:34

## 2017-06-16 RX ADMIN — MORPHINE SULFATE 30 MG: 15 TABLET ORAL at 14:03

## 2017-06-16 RX ADMIN — SODIUM CHLORIDE 10 MG: 9 INJECTION INTRAMUSCULAR; INTRAVENOUS; SUBCUTANEOUS at 19:11

## 2017-06-16 RX ADMIN — ONDANSETRON 4 MG: 4 TABLET, ORALLY DISINTEGRATING ORAL at 14:16

## 2017-06-16 RX ADMIN — METOCLOPRAMIDE 5 MG: 5 INJECTION, SOLUTION INTRAMUSCULAR; INTRAVENOUS at 12:30

## 2017-06-16 RX ADMIN — METOCLOPRAMIDE 5 MG: 5 INJECTION, SOLUTION INTRAMUSCULAR; INTRAVENOUS at 17:02

## 2017-06-16 RX ADMIN — MORPHINE SULFATE 60 MG: 60 TABLET, FILM COATED, EXTENDED RELEASE ORAL at 17:01

## 2017-06-16 RX ADMIN — POTASSIUM CHLORIDE 40 MEQ: 750 TABLET, FILM COATED, EXTENDED RELEASE ORAL at 10:37

## 2017-06-16 RX ADMIN — SODIUM CHLORIDE 10 MG: 9 INJECTION INTRAMUSCULAR; INTRAVENOUS; SUBCUTANEOUS at 00:31

## 2017-06-16 RX ADMIN — ENOXAPARIN SODIUM 40 MG: 40 INJECTION SUBCUTANEOUS at 09:49

## 2017-06-16 NOTE — PROGRESS NOTES
Bedside and Verbal shift change report given to [address] [facility] (oncoming nurse) by Mariama Haywood RN (offgoing nurse). Report included the following information SBAR, Intake/Output, MAR, Accordion and Recent Results.

## 2017-06-16 NOTE — PROGRESS NOTES
[facility] General Surgery    POD #10    Subjective     Feeling better today, much less nausea, no vomiting, ostomy more productive    Objective     Patient Vitals for the past 24 hrs:   Temp Pulse Resp BP SpO2   06/16/17 0827 98.3 °F (36.8 °C) 95 18 118/79 95 %   06/16/17 0048 99 °F (37.2 °C) 99 18 (!) 131/95 95 %   06/15/17 1845 98.5 °F (36.9 °C) 99 20 142/77 96 %   06/15/17 1405 99.3 °F (37.4 °C) (!) 105 17 (!) 135/91 93 %         Date 06/15/17 0700 - 06/16/17 0659 06/16/17 0700 - 06/17/17 0659   Shift 8435-1923 0039-2562 24 Hour Total 2290-2434 4280-6577 24 Hour Total   I  N  T  A  K  E   P. O. 50  50         P. O. 50  50       I.V.  (mL/kg/hr)  893.8  (0.5) 893.8  (0.2)         Volume (lactated Ringers infusion)  893.8 893.8       Shift Total  (mL/kg) 50  (0.3) 893.8  (5.7) 943.8  (6)      O  U  T  P  U  T   Urine  (mL/kg/hr) 450  (0.2) 500  (0.3) 950  (0.3) 500  500      Urine Voided 450 500 950 500  500    Stool  700 700         Stool  250 250         Output (ml) (Ileostomy 06/07/00 Lower  Abdomen)  450 450       Shift Total  (mL/kg) 450  (2.9) 1200  (7.7) 1650  (10.6) 500  (3.2)  500  (3.2)   NET -400 -306.3 -706.3 -500  -500   Weight (kg) 156 156 156 156 156 156       PE  Pulm - CTAB  CV - RRR  Abd - soft, ND, BS present, incision c/d/i, mild erythema, no drainage    Labs  Recent Results (from the past 12 hour(s))   METABOLIC PANEL, BASIC    Collection Time: 06/16/17 12:31 AM   Result Value Ref Range    Sodium 138 136 - 145 mmol/L    Potassium 4.4 3.5 - 5.1 mmol/L    Chloride 101 97 - 108 mmol/L    CO2 28 21 - 32 mmol/L    Anion gap 9 5 - 15 mmol/L    Glucose 72 65 - 100 mg/dL    BUN 3 (L) 6 - 20 MG/DL    Creatinine 0.54 (L) 0.55 - 1.02 MG/DL    BUN/Creatinine ratio 6 (L) 12 - 20      GFR est AA >60 >60 ml/min/1.73m2    GFR est non-AA >60 >60 ml/min/1.73m2    Calcium 8.3 (L) 8.5 - 10.1 MG/DL   MAGNESIUM    Collection Time: 06/16/17 12:31 AM   Result Value Ref Range    Magnesium 1.4 (L) 1.6 - 2.4 mg/dL   PHOSPHORUS    Collection Time: 06/16/17 12:31 AM   Result Value Ref Range    Phosphorus 3.0 2.6 - 4.7 MG/DL   CBC W/O DIFF    Collection Time: 06/16/17 12:31 AM   Result Value Ref Range    WBC 14.6 (H) 3.6 - 11.0 K/uL    RBC 3.22 (L) 3.80 - 5.20 M/uL    HGB 9.7 (L) 11.5 - 16.0 g/dL    HCT 30.1 (L) 35.0 - 47.0 %    MCV 93.5 80.0 - 99.0 FL    MCH 30.1 26.0 - 34.0 PG    MCHC 32.2 30.0 - 36.5 g/dL    RDW 14.4 11.5 - 14.5 %    PLATELET 903 534 - 470 K/uL         Assessment     Haylee Hunter is a 46 y. o.yr old female s/p Diagnostic Laparoscopy, converted to open Exploratory Laparotomy, Lysis of adhesions for 7 hours/ Intraoperative Upper Endoscopy by Dr. Bynum Plenty     Increase diet to full liquids today  Cont IVF today  Replace Mg today  Continue reglan  KUB yesterday nonspecific, likely ileus  WBC up a little but no fever, watching the wound  Am labs tomorrow      Jalen Resendez MD

## 2017-06-16 NOTE — PROGRESS NOTES
After test completion patient arrived to the unit via bed transported by 2 staff, patient stated that she vomited @ test site which included her po scheduled pain meds and wanted to take more pain meds.

## 2017-06-16 NOTE — PROGRESS NOTES
Bedside and Verbal shift change report given to [address] (oncoming nurse) by Renny Redding (offgoing nurse). Report included the following information SBAR, Kardex, Intake/Output, MAR, Accordion and Recent Results.

## 2017-06-16 NOTE — CDMP QUERY
Dr. Jj Roads :  Please clarify if this patient is being treated/managed for:    =>protein calorie imbalance in setting of Crohn Dx, noted by dietitian: Inadequate protein-energy intake related to altered GI function as evidenced by liquid diet x 6 of 8 hospital days, Albumin 1.9-2; Total Protein 5.3-5.6  =>Other Explanation of clinical findings  =>Unable to Determine (no explanation of clinical findings)    The medical record reflects the following clinical findings, treatment, and risk factors:    Risk Factors: crohn dx. sp colectomy and bowel resection w adhesiolysis   Clinical Indicators: albumin 1.9--2.0; t protein 5.3-5.6, noted by dietitian: Inadequate protein-energy intake related to altered GI function as evidenced by liquid diet x 6 of 8 hospital days,   Treatment: registered dietitian Ensure TID    Please clarify and document your clinical opinion in the progress notes and discharge summary including the definitive and/or presumptive diagnosis, (suspected or probable), related to the above clinical findings. Please include clinical findings supporting your diagnosis. Thank Pari Mendoza RN [address] [place] [address]; [id number]

## 2017-06-16 NOTE — PROGRESS NOTES
[address]  [address], [address]       GI PROGRESS NOTE  [facility]  [phone]    NAME: Chang Zuleta   :  1975   MRN:  [id number]       Subjective:     Much better today. Objective:     VITALS:   Last 24hrs VS reviewed since prior progress note. Most recent are:  Visit Vitals    /79 (BP 1 Location: Left arm, BP Patient Position: At rest)    Pulse 95    Temp 98.3 °F (36.8 °C)    Resp 18    Ht 5' 4\" (1.626 m)    Wt 156 kg (343 lb 14.7 oz)    SpO2 95%    BMI 59.03 kg/m2       PHYSICAL EXAM:  General: Cooperative, no acute distress    Neurologic:  Alert and oriented X 3. HEENT: PERRLA, EOMI. Lungs:  CTA Bilaterally.  No Wheezing  Heart:  s1 s2, Regular  rhythm,  No murmur   Abdomen: Soft, Non distended, tender, more active BS  Psych:   Good insight.     Lab Data Reviewed:     Recent Results (from the past 24 hour(s))   METABOLIC PANEL, BASIC    Collection Time: 17 12:31 AM   Result Value Ref Range    Sodium 138 136 - 145 mmol/L    Potassium 4.4 3.5 - 5.1 mmol/L    Chloride 101 97 - 108 mmol/L    CO2 28 21 - 32 mmol/L    Anion gap 9 5 - 15 mmol/L    Glucose 72 65 - 100 mg/dL    BUN 3 (L) 6 - 20 MG/DL    Creatinine 0.54 (L) 0.55 - 1.02 MG/DL    BUN/Creatinine ratio 6 (L) 12 - 20      GFR est AA >60 >60 ml/min/1.73m2    GFR est non-AA >60 >60 ml/min/1.73m2    Calcium 8.3 (L) 8.5 - 10.1 MG/DL   MAGNESIUM    Collection Time: 17 12:31 AM   Result Value Ref Range    Magnesium 1.4 (L) 1.6 - 2.4 mg/dL   PHOSPHORUS    Collection Time: 17 12:31 AM   Result Value Ref Range    Phosphorus 3.0 2.6 - 4.7 MG/DL   CBC W/O DIFF    Collection Time: 17 12:31 AM   Result Value Ref Range    WBC 14.6 (H) 3.6 - 11.0 K/uL    RBC 3.22 (L) 3.80 - 5.20 M/uL    HGB 9.7 (L) 11.5 - 16.0 g/dL    HCT 30.1 (L) 35.0 - 47.0 %    MCV 93.5 80.0 - 99.0 FL    MCH 30.1 26.0 - 34.0 PG    MCHC 32.2 30.0 - 36.5 g/dL    RDW 14.4 11.5 - 14.5 %    PLATELET 261 150 - 400 K/uL         ________________________________________________________________________       Assessment:   · Post-op Ileus - doing much better today with the addition of reglan  · S/p ex lap for small bowel perforation  · Crohn's Disease     Patient Active Problem List   Diagnosis Code    Crohn's disease ([address] K50.90    DVT (deep venous thrombosis) ([address] I82.409    Incarcerated ventral hernia K46.0    Right flank pain R10.9    Perforated bowel ([address] K63.1     Plan:   · Continue reglan  · Agree with full liquid diet   · Hopefully home over the weekend     Signed By: Oni Ortiz NP     6/16/2017  10:30 AM       GI attending note:    I personally examined the patient. Agree with the note of the NP.     Dr. Omar Hickey

## 2017-06-17 LAB
ANION GAP BLD CALC-SCNC: 10 MMOL/L (ref 5–15)
BUN SERPL-MCNC: 1 MG/DL (ref 6–20)
BUN/CREAT SERPL: 2 (ref 12–20)
CALCIUM SERPL-MCNC: 8.5 MG/DL (ref 8.5–10.1)
CHLORIDE SERPL-SCNC: 101 MMOL/L (ref 97–108)
CO2 SERPL-SCNC: 29 MMOL/L (ref 21–32)
CREAT SERPL-MCNC: 0.62 MG/DL (ref 0.55–1.02)
ERYTHROCYTE [DISTWIDTH] IN BLOOD BY AUTOMATED COUNT: 14.6 % (ref 11.5–14.5)
GLUCOSE SERPL-MCNC: 74 MG/DL (ref 65–100)
HCT VFR BLD AUTO: 34 % (ref 35–47)
HGB BLD-MCNC: 10.7 G/DL (ref 11.5–16)
MAGNESIUM SERPL-MCNC: 1.5 MG/DL (ref 1.6–2.4)
MCH RBC QN AUTO: 29.6 PG (ref 26–34)
MCHC RBC AUTO-ENTMCNC: 31.5 G/DL (ref 30–36.5)
MCV RBC AUTO: 94.2 FL (ref 80–99)
PHOSPHATE SERPL-MCNC: 4.5 MG/DL (ref 2.6–4.7)
PLATELET # BLD AUTO: 285 K/UL (ref 150–400)
POTASSIUM SERPL-SCNC: 4 MMOL/L (ref 3.5–5.1)
RBC # BLD AUTO: 3.61 M/UL (ref 3.8–5.2)
SODIUM SERPL-SCNC: 140 MMOL/L (ref 136–145)
WBC # BLD AUTO: 12.5 K/UL (ref 3.6–11)

## 2017-06-17 PROCEDURE — 74011250636 HC RX REV CODE- 250/636: Performed by: SURGERY

## 2017-06-17 PROCEDURE — 74011000250 HC RX REV CODE- 250: Performed by: SURGERY

## 2017-06-17 PROCEDURE — 74011250636 HC RX REV CODE- 250/636: Performed by: INTERNAL MEDICINE

## 2017-06-17 PROCEDURE — 94640 AIRWAY INHALATION TREATMENT: CPT

## 2017-06-17 PROCEDURE — 83735 ASSAY OF MAGNESIUM: CPT | Performed by: SURGERY

## 2017-06-17 PROCEDURE — 85027 COMPLETE CBC AUTOMATED: CPT | Performed by: SURGERY

## 2017-06-17 PROCEDURE — 74011000250 HC RX REV CODE- 250: Performed by: INTERNAL MEDICINE

## 2017-06-17 PROCEDURE — 74011250637 HC RX REV CODE- 250/637: Performed by: SURGERY

## 2017-06-17 PROCEDURE — 84100 ASSAY OF PHOSPHORUS: CPT | Performed by: SURGERY

## 2017-06-17 PROCEDURE — 74011250637 HC RX REV CODE- 250/637: Performed by: PHYSICIAN ASSISTANT

## 2017-06-17 PROCEDURE — 65210000002 HC RM PRIVATE GYN

## 2017-06-17 PROCEDURE — 80048 BASIC METABOLIC PNL TOTAL CA: CPT | Performed by: SURGERY

## 2017-06-17 PROCEDURE — 74011250636 HC RX REV CODE- 250/636: Performed by: NURSE PRACTITIONER

## 2017-06-17 PROCEDURE — 36415 COLL VENOUS BLD VENIPUNCTURE: CPT | Performed by: SURGERY

## 2017-06-17 RX ORDER — MAGNESIUM SULFATE HEPTAHYDRATE 40 MG/ML
2 INJECTION, SOLUTION INTRAVENOUS ONCE
Status: COMPLETED | OUTPATIENT
Start: 2017-06-17 | End: 2017-06-20

## 2017-06-17 RX ORDER — ALPRAZOLAM 1 MG/1
1 TABLET ORAL
Status: DISCONTINUED | OUTPATIENT
Start: 2017-06-17 | End: 2017-06-17 | Stop reason: SDUPTHER

## 2017-06-17 RX ORDER — ALBUTEROL SULFATE 4 MG/1
4 TABLET, FILM COATED, EXTENDED RELEASE ORAL
Status: DISCONTINUED | OUTPATIENT
Start: 2017-06-17 | End: 2017-06-21 | Stop reason: HOSPADM

## 2017-06-17 RX ORDER — FLUTICASONE FUROATE AND VILANTEROL 200; 25 UG/1; UG/1
1 POWDER RESPIRATORY (INHALATION) DAILY
Status: DISCONTINUED | OUTPATIENT
Start: 2017-06-17 | End: 2017-06-17 | Stop reason: CLARIF

## 2017-06-17 RX ORDER — ARFORMOTEROL TARTRATE 15 UG/2ML
15 SOLUTION RESPIRATORY (INHALATION)
Status: DISCONTINUED | OUTPATIENT
Start: 2017-06-17 | End: 2017-06-21 | Stop reason: HOSPADM

## 2017-06-17 RX ORDER — BUDESONIDE 0.5 MG/2ML
500 INHALANT ORAL
Status: DISCONTINUED | OUTPATIENT
Start: 2017-06-17 | End: 2017-06-21 | Stop reason: HOSPADM

## 2017-06-17 RX ORDER — VANCOMYCIN HYDROCHLORIDE
1250 EVERY 12 HOURS
Status: DISCONTINUED | OUTPATIENT
Start: 2017-06-17 | End: 2017-06-17 | Stop reason: DRUGHIGH

## 2017-06-17 RX ORDER — VANCOMYCIN 2 GRAM/500 ML IN 0.9 % SODIUM CHLORIDE INTRAVENOUS
2000 ONCE
Status: COMPLETED | OUTPATIENT
Start: 2017-06-17 | End: 2017-06-20

## 2017-06-17 RX ORDER — VANCOMYCIN 2 GRAM/500 ML IN 0.9 % SODIUM CHLORIDE INTRAVENOUS
2000 EVERY 12 HOURS
Status: DISCONTINUED | OUTPATIENT
Start: 2017-06-18 | End: 2017-06-21 | Stop reason: HOSPADM

## 2017-06-17 RX ADMIN — Medication 10 ML: at 06:10

## 2017-06-17 RX ADMIN — BUDESONIDE 500 MCG: 0.5 INHALANT RESPIRATORY (INHALATION) at 20:17

## 2017-06-17 RX ADMIN — SIMETHICONE 20 MG: 20 SUSPENSION/ DROPS ORAL at 21:27

## 2017-06-17 RX ADMIN — ENOXAPARIN SODIUM 40 MG: 40 INJECTION SUBCUTANEOUS at 09:34

## 2017-06-17 RX ADMIN — SODIUM CHLORIDE, SODIUM LACTATE, POTASSIUM CHLORIDE, AND CALCIUM CHLORIDE 75 ML/HR: 600; 310; 30; 20 INJECTION, SOLUTION INTRAVENOUS at 17:49

## 2017-06-17 RX ADMIN — ARFORMOTEROL TARTRATE 15 MCG: 15 SOLUTION RESPIRATORY (INHALATION) at 08:41

## 2017-06-17 RX ADMIN — MORPHINE SULFATE 60 MG: 60 TABLET, FILM COATED, EXTENDED RELEASE ORAL at 09:33

## 2017-06-17 RX ADMIN — MORPHINE SULFATE 30 MG: 15 TABLET ORAL at 02:14

## 2017-06-17 RX ADMIN — SODIUM CHLORIDE 10 MG: 9 INJECTION INTRAMUSCULAR; INTRAVENOUS; SUBCUTANEOUS at 15:24

## 2017-06-17 RX ADMIN — SIMETHICONE 20 MG: 20 SUSPENSION/ DROPS ORAL at 15:32

## 2017-06-17 RX ADMIN — BUDESONIDE 500 MCG: 0.5 INHALANT RESPIRATORY (INHALATION) at 08:41

## 2017-06-17 RX ADMIN — ONDANSETRON 4 MG: 4 TABLET, ORALLY DISINTEGRATING ORAL at 07:48

## 2017-06-17 RX ADMIN — METOCLOPRAMIDE 5 MG: 5 INJECTION, SOLUTION INTRAMUSCULAR; INTRAVENOUS at 06:09

## 2017-06-17 RX ADMIN — SODIUM CHLORIDE 10 MG: 9 INJECTION INTRAMUSCULAR; INTRAVENOUS; SUBCUTANEOUS at 21:28

## 2017-06-17 RX ADMIN — SODIUM CHLORIDE 10 MG: 9 INJECTION INTRAMUSCULAR; INTRAVENOUS; SUBCUTANEOUS at 02:14

## 2017-06-17 RX ADMIN — ONDANSETRON 4 MG: 4 TABLET, ORALLY DISINTEGRATING ORAL at 19:24

## 2017-06-17 RX ADMIN — MORPHINE SULFATE 30 MG: 15 TABLET ORAL at 11:35

## 2017-06-17 RX ADMIN — METOCLOPRAMIDE 5 MG: 5 INJECTION, SOLUTION INTRAMUSCULAR; INTRAVENOUS at 23:52

## 2017-06-17 RX ADMIN — ONDANSETRON 4 MG: 4 TABLET, ORALLY DISINTEGRATING ORAL at 13:32

## 2017-06-17 RX ADMIN — MORPHINE SULFATE 60 MG: 60 TABLET, FILM COATED, EXTENDED RELEASE ORAL at 15:24

## 2017-06-17 RX ADMIN — Medication 10 ML: at 21:29

## 2017-06-17 RX ADMIN — VANCOMYCIN HYDROCHLORIDE 2000 MG: 10 INJECTION, POWDER, LYOPHILIZED, FOR SOLUTION INTRAVENOUS at 12:32

## 2017-06-17 RX ADMIN — MORPHINE SULFATE 30 MG: 15 TABLET ORAL at 17:24

## 2017-06-17 RX ADMIN — METOCLOPRAMIDE 5 MG: 5 INJECTION, SOLUTION INTRAMUSCULAR; INTRAVENOUS at 01:06

## 2017-06-17 RX ADMIN — METOCLOPRAMIDE 5 MG: 5 INJECTION, SOLUTION INTRAMUSCULAR; INTRAVENOUS at 11:35

## 2017-06-17 RX ADMIN — MORPHINE SULFATE 60 MG: 60 TABLET, FILM COATED, EXTENDED RELEASE ORAL at 21:26

## 2017-06-17 RX ADMIN — SODIUM CHLORIDE 10 MG: 9 INJECTION INTRAMUSCULAR; INTRAVENOUS; SUBCUTANEOUS at 09:34

## 2017-06-17 RX ADMIN — POTASSIUM CHLORIDE 40 MEQ: 750 TABLET, FILM COATED, EXTENDED RELEASE ORAL at 09:32

## 2017-06-17 RX ADMIN — Medication 10 ML: at 13:32

## 2017-06-17 RX ADMIN — ARFORMOTEROL TARTRATE 15 MCG: 15 SOLUTION RESPIRATORY (INHALATION) at 20:17

## 2017-06-17 RX ADMIN — METOCLOPRAMIDE 5 MG: 5 INJECTION, SOLUTION INTRAMUSCULAR; INTRAVENOUS at 17:25

## 2017-06-17 RX ADMIN — MAGNESIUM SULFATE HEPTAHYDRATE 2 G: 40 INJECTION, SOLUTION INTRAVENOUS at 11:35

## 2017-06-17 RX ADMIN — MORPHINE SULFATE 30 MG: 15 TABLET ORAL at 06:13

## 2017-06-17 RX ADMIN — Medication 10 ML: at 11:35

## 2017-06-17 RX ADMIN — VANCOMYCIN HYDROCHLORIDE 2000 MG: 10 INJECTION, POWDER, LYOPHILIZED, FOR SOLUTION INTRAVENOUS at 23:52

## 2017-06-17 RX ADMIN — PANTOPRAZOLE SODIUM 40 MG: 40 TABLET, DELAYED RELEASE ORAL at 07:48

## 2017-06-17 NOTE — PROGRESS NOTES
Progress Note    Patient: Fernanda Burr MRN: [id number]  SSN: [id number]    YOB: 1975  Age: 46 y.o. Sex: female      Admit Date: 2017    11 Days Post-Op    Procedure:  Procedure(s):  Diagnostic Laparoscopy, converted to open Exploratory Laparotomy, Lysis of adhesions for 7 hours/ Intraoperative Upper Endoscopy by Dr. Julissa Cgale. Subjective:     No acute surgical issues. Pt reported some upper abdominal pain. Tolerating full liquids but does report some nausea. No vomiting.   Ostomy is productive    Objective:     Visit Vitals    /74 (BP 1 Location: Left arm, BP Patient Position: At rest)    Pulse 98    Temp 98.4 °F (36.9 °C)    Resp 18    Ht 5' 4\" (1.626 m)    Wt 343 lb 14.7 oz (156 kg)    SpO2 95%    BMI 59.03 kg/m2       Temp (24hrs), Av.6 °F (37 °C), Min:98.3 °F (36.8 °C), Max:98.9 °F (37.2 °C)        Physical Exam:    Gen:  NAD  Pulm:  Unlabored  Abd:  S/ND/appropriate TTP  Wound:  Clean and intact with erythema to upper abdominal incision    Recent Results (from the past 24 hour(s))   METABOLIC PANEL, BASIC    Collection Time: 17  6:05 AM   Result Value Ref Range    Sodium 140 136 - 145 mmol/L    Potassium 4.0 3.5 - 5.1 mmol/L    Chloride 101 97 - 108 mmol/L    CO2 29 21 - 32 mmol/L    Anion gap 10 5 - 15 mmol/L    Glucose 74 65 - 100 mg/dL    BUN 1 (L) 6 - 20 MG/DL    Creatinine 0.62 0.55 - 1.02 MG/DL    BUN/Creatinine ratio 2 (L) 12 - 20      GFR est AA >60 >60 ml/min/1.73m2    GFR est non-AA >60 >60 ml/min/1.73m2    Calcium 8.5 8.5 - 10.1 MG/DL   CBC W/O DIFF    Collection Time: 17  6:05 AM   Result Value Ref Range    WBC 12.5 (H) 3.6 - 11.0 K/uL    RBC 3.61 (L) 3.80 - 5.20 M/uL    HGB 10.7 (L) 11.5 - 16.0 g/dL    HCT 34.0 (L) 35.0 - 47.0 %    MCV 94.2 80.0 - 99.0 FL    MCH 29.6 26.0 - 34.0 PG    MCHC 31.5 30.0 - 36.5 g/dL    RDW 14.6 (H) 11.5 - 14.5 %    PLATELET 579 045 - 120 K/uL   MAGNESIUM    Collection Time: 17  6:05 AM   Result Value Ref Range    Magnesium 1.5 (L) 1.6 - 2.4 mg/dL   PHOSPHORUS    Collection Time: 06/17/17  6:05 AM   Result Value Ref Range    Phosphorus 4.5 2.6 - 4.7 MG/DL             Assessment:     Hospital Problems  Date Reviewed: 6/6/2017          Codes Class Noted POA    Perforated bowel ([facility] ICD-10-CM: K63.1  ICD-9-CM: 569.83  6/6/2017 Unknown              Plan/Recommendations/Medical Decision Making:     - Continue full liquids  - Cellulitis:  Will start Vancomycin given significant erythema and leukocytosis  - Out of bed as tolerated  - Pain control    Signed By: Sebas Hernandez MD     June 17, 2017

## 2017-06-17 NOTE — PROGRESS NOTES
[address]  [address] [facility], [address]       GI PROGRESS NOTE      NAME: Fish Ramos   :  1975   MRN:  [id number]       Subjective:     Much better today. Tolerating liquids    Objective:     VITALS:   Last 24hrs VS reviewed since prior progress note. Most recent are:  Visit Vitals    /82 (BP 1 Location: Left arm, BP Patient Position: At rest)    Pulse 96    Temp 98.6 °F (37 °C)    Resp 18    Ht 5' 4\" (1.626 m)    Wt 156 kg (343 lb 14.7 oz)    SpO2 92%    BMI 59.03 kg/m2       PHYSICAL EXAM:  General: Cooperative, no acute distress    Neurologic:  Alert and oriented X 3. HEENT: PERRLA, EOMI. Lungs:  CTA Bilaterally.  No Wheezing  Heart:  s1 s2, Regular  rhythm,  No murmur   Abdomen: Soft, Non distended, tender, more active BS  Psych:   Good insight.     Lab Data Reviewed:     Recent Results (from the past 24 hour(s))   METABOLIC PANEL, BASIC    Collection Time: 17  6:05 AM   Result Value Ref Range    Sodium 140 136 - 145 mmol/L    Potassium 4.0 3.5 - 5.1 mmol/L    Chloride 101 97 - 108 mmol/L    CO2 29 21 - 32 mmol/L    Anion gap 10 5 - 15 mmol/L    Glucose 74 65 - 100 mg/dL    BUN 1 (L) 6 - 20 MG/DL    Creatinine 0.62 0.55 - 1.02 MG/DL    BUN/Creatinine ratio 2 (L) 12 - 20      GFR est AA >60 >60 ml/min/1.73m2    GFR est non-AA >60 >60 ml/min/1.73m2    Calcium 8.5 8.5 - 10.1 MG/DL   CBC W/O DIFF    Collection Time: 17  6:05 AM   Result Value Ref Range    WBC 12.5 (H) 3.6 - 11.0 K/uL    RBC 3.61 (L) 3.80 - 5.20 M/uL    HGB 10.7 (L) 11.5 - 16.0 g/dL    HCT 34.0 (L) 35.0 - 47.0 %    MCV 94.2 80.0 - 99.0 FL    MCH 29.6 26.0 - 34.0 PG    MCHC 31.5 30.0 - 36.5 g/dL    RDW 14.6 (H) 11.5 - 14.5 %    PLATELET 593 161 - 657 K/uL   MAGNESIUM    Collection Time: 17  6:05 AM   Result Value Ref Range    Magnesium 1.5 (L) 1.6 - 2.4 mg/dL   PHOSPHORUS    Collection Time: 17  6:05 AM   Result Value Ref Range    Phosphorus 4.5 2.6 - 4.7 MG/DL ________________________________________________________________________       Assessment:   · Post-op Ileus - doing much better with the addition of reglan  · S/p ex lap for small bowel perforation  · Crohn's Disease     Patient Active Problem List   Diagnosis Code    Crohn's disease ([facility] K50.90    DVT (deep venous thrombosis) ([facility] I82.409    Incarcerated ventral hernia K46.0    Right flank pain R10.9    Perforated bowel ([facility] K63.1     Plan:   · Continue short run reglan  · Based on how she does, advance to soft mechanical diet per pt preference  · Consider discontinuing oral potassium.  Pt states this causes her GI upset  · Hopefully home over the weekend     Signed By: Courtney Pryor MD     6/17/2017  10:30 AM

## 2017-06-17 NOTE — PROGRESS NOTES
Pharmacist Note - Vancomycin Dosing    Consult provided for this 46 y.o. female for indication of SSTI 2/2 significant erythema and leukocytosis; s/p Dx laparoscopy, converted to open ExLap  Antibiotic regimen(s): Vancomycin     Recent Labs      17   0605  17   0031   WBC  12.5*  14.6*   CREA  0.62  0.54*   BUN  1*  3*     Frequency of BMP: Tomorrow AM  Height: 162.6 cm  Weight: 156 kg  Est CrCl: >100 ml/min; UO: 0.3 ml/kg/hr (one occurrence)  Temp (24hrs), Av.6 °F (37 °C), Min:98.3 °F (36.8 °C), Max:98.9 °F (37.2 °C)    Cultures:   Resp - Few MRSA, Few Yeast    Goal trough = 10 - 15 mcg/mL    Therapy will be initiated with a loading dose of 2000 mg IV x 1 to be followed by a maintenance dose of 2000 mg IV every 12 hours. Pharmacy to follow patient daily and order levels / make dose adjustments as appropriate.

## 2017-06-18 LAB
ANION GAP BLD CALC-SCNC: 10 MMOL/L (ref 5–15)
BUN SERPL-MCNC: 2 MG/DL (ref 6–20)
BUN/CREAT SERPL: 3 (ref 12–20)
CALCIUM SERPL-MCNC: 8.6 MG/DL (ref 8.5–10.1)
CHLORIDE SERPL-SCNC: 99 MMOL/L (ref 97–108)
CO2 SERPL-SCNC: 26 MMOL/L (ref 21–32)
CREAT SERPL-MCNC: 0.72 MG/DL (ref 0.55–1.02)
GLUCOSE SERPL-MCNC: 87 MG/DL (ref 65–100)
POTASSIUM SERPL-SCNC: 4 MMOL/L (ref 3.5–5.1)
SODIUM SERPL-SCNC: 135 MMOL/L (ref 136–145)

## 2017-06-18 PROCEDURE — 74011250636 HC RX REV CODE- 250/636: Performed by: SURGERY

## 2017-06-18 PROCEDURE — 94640 AIRWAY INHALATION TREATMENT: CPT

## 2017-06-18 PROCEDURE — 65210000002 HC RM PRIVATE GYN

## 2017-06-18 PROCEDURE — 74011000250 HC RX REV CODE- 250: Performed by: SURGERY

## 2017-06-18 PROCEDURE — 80048 BASIC METABOLIC PNL TOTAL CA: CPT | Performed by: SURGERY

## 2017-06-18 PROCEDURE — 74011250636 HC RX REV CODE- 250/636: Performed by: INTERNAL MEDICINE

## 2017-06-18 PROCEDURE — 87205 SMEAR GRAM STAIN: CPT | Performed by: SURGERY

## 2017-06-18 PROCEDURE — 74011250637 HC RX REV CODE- 250/637: Performed by: SURGERY

## 2017-06-18 PROCEDURE — 74011250637 HC RX REV CODE- 250/637: Performed by: PHYSICIAN ASSISTANT

## 2017-06-18 PROCEDURE — 87077 CULTURE AEROBIC IDENTIFY: CPT | Performed by: SURGERY

## 2017-06-18 PROCEDURE — 74011250636 HC RX REV CODE- 250/636: Performed by: NURSE PRACTITIONER

## 2017-06-18 PROCEDURE — 36415 COLL VENOUS BLD VENIPUNCTURE: CPT | Performed by: SURGERY

## 2017-06-18 PROCEDURE — 74011000250 HC RX REV CODE- 250: Performed by: INTERNAL MEDICINE

## 2017-06-18 PROCEDURE — 87186 SC STD MICRODIL/AGAR DIL: CPT | Performed by: SURGERY

## 2017-06-18 PROCEDURE — 87147 CULTURE TYPE IMMUNOLOGIC: CPT | Performed by: SURGERY

## 2017-06-18 RX ORDER — HYDROMORPHONE HYDROCHLORIDE 1 MG/ML
1 INJECTION, SOLUTION INTRAMUSCULAR; INTRAVENOUS; SUBCUTANEOUS
Status: COMPLETED | OUTPATIENT
Start: 2017-06-18 | End: 2017-06-18

## 2017-06-18 RX ORDER — HYDROMORPHONE HYDROCHLORIDE 1 MG/ML
1 INJECTION, SOLUTION INTRAMUSCULAR; INTRAVENOUS; SUBCUTANEOUS
Status: DISCONTINUED | OUTPATIENT
Start: 2017-06-18 | End: 2017-06-21 | Stop reason: HOSPADM

## 2017-06-18 RX ADMIN — SODIUM CHLORIDE 10 MG: 9 INJECTION INTRAMUSCULAR; INTRAVENOUS; SUBCUTANEOUS at 16:32

## 2017-06-18 RX ADMIN — MORPHINE SULFATE 30 MG: 15 TABLET ORAL at 07:16

## 2017-06-18 RX ADMIN — METOCLOPRAMIDE 5 MG: 5 INJECTION, SOLUTION INTRAMUSCULAR; INTRAVENOUS at 23:25

## 2017-06-18 RX ADMIN — METOCLOPRAMIDE 5 MG: 5 INJECTION, SOLUTION INTRAMUSCULAR; INTRAVENOUS at 12:34

## 2017-06-18 RX ADMIN — ONDANSETRON 4 MG: 4 TABLET, ORALLY DISINTEGRATING ORAL at 02:08

## 2017-06-18 RX ADMIN — MORPHINE SULFATE 30 MG: 15 TABLET ORAL at 02:08

## 2017-06-18 RX ADMIN — BUDESONIDE 500 MCG: 0.5 INHALANT RESPIRATORY (INHALATION) at 20:03

## 2017-06-18 RX ADMIN — SODIUM CHLORIDE 10 MG: 9 INJECTION INTRAMUSCULAR; INTRAVENOUS; SUBCUTANEOUS at 04:29

## 2017-06-18 RX ADMIN — ENOXAPARIN SODIUM 40 MG: 40 INJECTION SUBCUTANEOUS at 09:03

## 2017-06-18 RX ADMIN — ARFORMOTEROL TARTRATE 15 MCG: 15 SOLUTION RESPIRATORY (INHALATION) at 20:03

## 2017-06-18 RX ADMIN — ONDANSETRON 4 MG: 2 INJECTION INTRAMUSCULAR; INTRAVENOUS at 07:14

## 2017-06-18 RX ADMIN — MORPHINE SULFATE 60 MG: 60 TABLET, FILM COATED, EXTENDED RELEASE ORAL at 16:31

## 2017-06-18 RX ADMIN — Medication 10 ML: at 21:02

## 2017-06-18 RX ADMIN — BUDESONIDE 500 MCG: 0.5 INHALANT RESPIRATORY (INHALATION) at 09:10

## 2017-06-18 RX ADMIN — ARFORMOTEROL TARTRATE 15 MCG: 15 SOLUTION RESPIRATORY (INHALATION) at 09:10

## 2017-06-18 RX ADMIN — METOCLOPRAMIDE 5 MG: 5 INJECTION, SOLUTION INTRAMUSCULAR; INTRAVENOUS at 18:12

## 2017-06-18 RX ADMIN — Medication 10 ML: at 16:32

## 2017-06-18 RX ADMIN — Medication 10 ML: at 12:34

## 2017-06-18 RX ADMIN — SODIUM CHLORIDE, SODIUM LACTATE, POTASSIUM CHLORIDE, AND CALCIUM CHLORIDE 75 ML/HR: 600; 310; 30; 20 INJECTION, SOLUTION INTRAVENOUS at 02:08

## 2017-06-18 RX ADMIN — PANTOPRAZOLE SODIUM 40 MG: 40 TABLET, DELAYED RELEASE ORAL at 07:16

## 2017-06-18 RX ADMIN — ONDANSETRON 4 MG: 2 INJECTION INTRAMUSCULAR; INTRAVENOUS at 21:02

## 2017-06-18 RX ADMIN — METOCLOPRAMIDE 5 MG: 5 INJECTION, SOLUTION INTRAMUSCULAR; INTRAVENOUS at 07:15

## 2017-06-18 RX ADMIN — MORPHINE SULFATE 30 MG: 15 TABLET ORAL at 14:23

## 2017-06-18 RX ADMIN — SIMETHICONE 20 MG: 20 SUSPENSION/ DROPS ORAL at 04:29

## 2017-06-18 RX ADMIN — SIMETHICONE 20 MG: 20 SUSPENSION/ DROPS ORAL at 10:29

## 2017-06-18 RX ADMIN — SODIUM CHLORIDE 10 MG: 9 INJECTION INTRAMUSCULAR; INTRAVENOUS; SUBCUTANEOUS at 10:29

## 2017-06-18 RX ADMIN — Medication 10 ML: at 07:16

## 2017-06-18 RX ADMIN — MORPHINE SULFATE 30 MG: 15 TABLET ORAL at 18:27

## 2017-06-18 RX ADMIN — ONDANSETRON 4 MG: 4 TABLET, ORALLY DISINTEGRATING ORAL at 14:23

## 2017-06-18 RX ADMIN — SIMETHICONE 20 MG: 20 SUSPENSION/ DROPS ORAL at 16:32

## 2017-06-18 RX ADMIN — MORPHINE SULFATE 60 MG: 60 TABLET, FILM COATED, EXTENDED RELEASE ORAL at 09:03

## 2017-06-18 RX ADMIN — MORPHINE SULFATE 60 MG: 60 TABLET, FILM COATED, EXTENDED RELEASE ORAL at 21:02

## 2017-06-18 RX ADMIN — VANCOMYCIN HYDROCHLORIDE 2000 MG: 10 INJECTION, POWDER, LYOPHILIZED, FOR SOLUTION INTRAVENOUS at 12:42

## 2017-06-18 RX ADMIN — HYDROMORPHONE HYDROCHLORIDE 1 MG: 1 INJECTION, SOLUTION INTRAMUSCULAR; INTRAVENOUS; SUBCUTANEOUS at 17:55

## 2017-06-18 NOTE — PROGRESS NOTES
[address]  [address] [facility], [address]       GI PROGRESS NOTE      NAME: Cat Beckwith   :  1975   MRN:  [id number]       Subjective:     Much better today. Tolerating liquids. No vomiting yesterday. Started on Vanco for cellulitis. Objective:     VITALS:   Last 24hrs VS reviewed since prior progress note. Most recent are:  Visit Vitals    /69 (BP 1 Location: Left arm, BP Patient Position: At rest)    Pulse (!) 110    Temp 98.7 °F (37.1 °C)    Resp 18    Ht 5' 4\" (1.626 m)    Wt 156 kg (343 lb 14.7 oz)    SpO2 95%    BMI 59.03 kg/m2       PHYSICAL EXAM:  General: Cooperative, no acute distress    Neurologic:  Alert and oriented X 3. HEENT: PERRLA, EOMI. Lungs:  CTA Bilaterally. No Wheezing  Heart:  s1 s2, Regular  rhythm,  No murmur   Abdomen: Soft, Non distended, tender, more active BS, erythema near staple line.   Psych:   Good insight.     Lab Data Reviewed:     Recent Results (from the past 24 hour(s))   METABOLIC PANEL, BASIC    Collection Time: 17  4:21 AM   Result Value Ref Range    Sodium 135 (L) 136 - 145 mmol/L    Potassium 4.0 3.5 - 5.1 mmol/L    Chloride 99 97 - 108 mmol/L    CO2 26 21 - 32 mmol/L    Anion gap 10 5 - 15 mmol/L    Glucose 87 65 - 100 mg/dL    BUN 2 (L) 6 - 20 MG/DL    Creatinine 0.72 0.55 - 1.02 MG/DL    BUN/Creatinine ratio 3 (L) 12 - 20      GFR est AA >60 >60 ml/min/1.73m2    GFR est non-AA >60 >60 ml/min/1.73m2    Calcium 8.6 8.5 - 10.1 MG/DL         ________________________________________________________________________       Assessment:   · Post-op Ileus - doing much better with the addition of reglan  · S/p ex lap for small bowel perforation  · Crohn's Disease  · Cellulitis on Abx     Patient Active Problem List   Diagnosis Code    Crohn's disease ([facility] K50.90    DVT (deep venous thrombosis) ([facility]) I82.409    Incarcerated ventral hernia K46.0    Right flank pain R10.9    Perforated bowel ([facility] K63.1 Plan:   · Continue short run reglan  · Advance to soft mechanical diet  · Dr. Alvarez Patches to follow tomorrow       Signed By: Harrison Rodriguez MD     6/18/2017  10:30 AM

## 2017-06-18 NOTE — PROGRESS NOTES
Progress Note    Patient: Cat Beckwith MRN: [id number]  SSN: [id number]    YOB: 1975  Age: 46 y.o. Sex: female      Admit Date: 2017    12 Days Post-Op    Procedure:  Procedure(s):  Diagnostic Laparoscopy, converted to open Exploratory Laparotomy, Lysis of adhesions for 7 hours/ Intraoperative Upper Endoscopy by Dr. Joseph Rosa. Subjective:     No acute surgical issues. Pt with worsening cellulitis to midline abdominal wound. Wound was opened at bedside with purulent drainage. Objective:     Visit Vitals    BP (!) 128/91 (BP 1 Location: Left arm, BP Patient Position: Head of bed elevated (Comment degrees))    Pulse (!) 105    Temp 98.7 °F (37.1 °C)    Resp 18    Ht 5' 4\" (1.626 m)    Wt 343 lb 14.7 oz (156 kg)    SpO2 95%    BMI 59.03 kg/m2       Temp (24hrs), Av.8 °F (37.1 °C), Min:98.5 °F (36.9 °C), Max:99.1 °F (37.3 °C)        Physical Exam:    Gen:  NAD  Pulm:  Unlabored  Abd:  S/ND/appropriate TTP  Wound:  Clean and intact with erythema to upper abdominal incision. 50 ml purulent fluid was expressed. Recent Results (from the past 24 hour(s))   METABOLIC PANEL, BASIC    Collection Time: 17  4:21 AM   Result Value Ref Range    Sodium 135 (L) 136 - 145 mmol/L    Potassium 4.0 3.5 - 5.1 mmol/L    Chloride 99 97 - 108 mmol/L    CO2 26 21 - 32 mmol/L    Anion gap 10 5 - 15 mmol/L    Glucose 87 65 - 100 mg/dL    BUN 2 (L) 6 - 20 MG/DL    Creatinine 0.72 0.55 - 1.02 MG/DL    BUN/Creatinine ratio 3 (L) 12 - 20      GFR est AA >60 >60 ml/min/1.73m2    GFR est non-AA >60 >60 ml/min/1.73m2    Calcium 8.6 8.5 - 10.1 MG/DL             Assessment:     Hospital Problems  Date Reviewed: 2017          Codes Class Noted POA    Perforated bowel ([facility] ICD-10-CM: K63.1  ICD-9-CM: 569.83  2017 Unknown              Plan/Recommendations/Medical Decision Making:     - Continue mechanical soft diet  - Cellulitis and abdominal wall abscess:  Continue Vancomycin.   Follow-up culture results  - Out of bed as tolerated  - Pain control    Signed By: Daija Vasquez MD     June 18, 2017

## 2017-06-18 NOTE — PROGRESS NOTES
Bedside and Verbal shift change report given to Nikhil Bocanegra (oncoming nurse) by Abundio Banks (offgoing nurse). Report included the following information SBAR, Kardex, Procedure Summary, Intake/Output and MAR.

## 2017-06-18 NOTE — PROGRESS NOTES
Bedside shift change report given to Mini DOTSON (oncoming nurse) by Paz Herring (offgoing nurse). Report included the following information SBAR.

## 2017-06-18 NOTE — PROGRESS NOTES
Bedside shift change report given to Earl Wilcox RN (oncoming nurse) by Carmen Saez RN (offgoing nurse). Report given with SBAR, Kardex, Intake/Output and MAR.

## 2017-06-19 LAB
DATE LAST DOSE: ABNORMAL
ERYTHROCYTE [DISTWIDTH] IN BLOOD BY AUTOMATED COUNT: 14.6 % (ref 11.5–14.5)
HCT VFR BLD AUTO: 35.9 % (ref 35–47)
HGB BLD-MCNC: 11.6 G/DL (ref 11.5–16)
MCH RBC QN AUTO: 30.3 PG (ref 26–34)
MCHC RBC AUTO-ENTMCNC: 32.3 G/DL (ref 30–36.5)
MCV RBC AUTO: 93.7 FL (ref 80–99)
PLATELET # BLD AUTO: 353 K/UL (ref 150–400)
RBC # BLD AUTO: 3.83 M/UL (ref 3.8–5.2)
REPORTED DOSE,DOSE: ABNORMAL UNITS
REPORTED DOSE/TIME,TMG: ABNORMAL
VANCOMYCIN TROUGH SERPL-MCNC: 17 UG/ML (ref 5–10)
WBC # BLD AUTO: 19.4 K/UL (ref 3.6–11)

## 2017-06-19 PROCEDURE — 74011250636 HC RX REV CODE- 250/636: Performed by: SURGERY

## 2017-06-19 PROCEDURE — 74011250637 HC RX REV CODE- 250/637: Performed by: PHYSICIAN ASSISTANT

## 2017-06-19 PROCEDURE — 74011250637 HC RX REV CODE- 250/637: Performed by: SURGERY

## 2017-06-19 PROCEDURE — 74011000250 HC RX REV CODE- 250: Performed by: SURGERY

## 2017-06-19 PROCEDURE — 94640 AIRWAY INHALATION TREATMENT: CPT

## 2017-06-19 PROCEDURE — 65210000002 HC RM PRIVATE GYN

## 2017-06-19 PROCEDURE — 74011250636 HC RX REV CODE- 250/636: Performed by: NURSE PRACTITIONER

## 2017-06-19 PROCEDURE — 80202 ASSAY OF VANCOMYCIN: CPT | Performed by: SURGERY

## 2017-06-19 PROCEDURE — 36415 COLL VENOUS BLD VENIPUNCTURE: CPT | Performed by: SURGERY

## 2017-06-19 PROCEDURE — 85027 COMPLETE CBC AUTOMATED: CPT | Performed by: SURGERY

## 2017-06-19 PROCEDURE — 74011250636 HC RX REV CODE- 250/636: Performed by: INTERNAL MEDICINE

## 2017-06-19 PROCEDURE — 74011000250 HC RX REV CODE- 250: Performed by: INTERNAL MEDICINE

## 2017-06-19 RX ORDER — DOCUSATE SODIUM 100 MG/1
100 CAPSULE, LIQUID FILLED ORAL 2 TIMES DAILY
Status: DISCONTINUED | OUTPATIENT
Start: 2017-06-19 | End: 2017-06-21 | Stop reason: HOSPADM

## 2017-06-19 RX ADMIN — Medication 10 ML: at 12:02

## 2017-06-19 RX ADMIN — METOCLOPRAMIDE 5 MG: 5 INJECTION, SOLUTION INTRAMUSCULAR; INTRAVENOUS at 12:00

## 2017-06-19 RX ADMIN — ONDANSETRON 4 MG: 4 TABLET, ORALLY DISINTEGRATING ORAL at 04:23

## 2017-06-19 RX ADMIN — Medication 20 ML: at 12:02

## 2017-06-19 RX ADMIN — MORPHINE SULFATE 60 MG: 60 TABLET, FILM COATED, EXTENDED RELEASE ORAL at 16:07

## 2017-06-19 RX ADMIN — VANCOMYCIN HYDROCHLORIDE 2000 MG: 10 INJECTION, POWDER, LYOPHILIZED, FOR SOLUTION INTRAVENOUS at 00:49

## 2017-06-19 RX ADMIN — METOCLOPRAMIDE 5 MG: 5 INJECTION, SOLUTION INTRAMUSCULAR; INTRAVENOUS at 18:48

## 2017-06-19 RX ADMIN — MORPHINE SULFATE 60 MG: 60 TABLET, FILM COATED, EXTENDED RELEASE ORAL at 22:09

## 2017-06-19 RX ADMIN — HYDROMORPHONE HYDROCHLORIDE 1 MG: 1 INJECTION, SOLUTION INTRAMUSCULAR; INTRAVENOUS; SUBCUTANEOUS at 08:41

## 2017-06-19 RX ADMIN — PANTOPRAZOLE SODIUM 40 MG: 40 TABLET, DELAYED RELEASE ORAL at 07:39

## 2017-06-19 RX ADMIN — DOCUSATE SODIUM 100 MG: 100 CAPSULE, LIQUID FILLED ORAL at 18:46

## 2017-06-19 RX ADMIN — VANCOMYCIN HYDROCHLORIDE 2000 MG: 10 INJECTION, POWDER, LYOPHILIZED, FOR SOLUTION INTRAVENOUS at 12:01

## 2017-06-19 RX ADMIN — ENOXAPARIN SODIUM 40 MG: 40 INJECTION SUBCUTANEOUS at 08:53

## 2017-06-19 RX ADMIN — SODIUM CHLORIDE 10 MG: 9 INJECTION INTRAMUSCULAR; INTRAVENOUS; SUBCUTANEOUS at 20:38

## 2017-06-19 RX ADMIN — MORPHINE SULFATE 30 MG: 15 TABLET ORAL at 22:09

## 2017-06-19 RX ADMIN — Medication 10 ML: at 07:39

## 2017-06-19 RX ADMIN — MORPHINE SULFATE 30 MG: 15 TABLET ORAL at 00:48

## 2017-06-19 RX ADMIN — HYDROMORPHONE HYDROCHLORIDE 1 MG: 1 INJECTION, SOLUTION INTRAMUSCULAR; INTRAVENOUS; SUBCUTANEOUS at 23:44

## 2017-06-19 RX ADMIN — SODIUM CHLORIDE 10 MG: 9 INJECTION INTRAMUSCULAR; INTRAVENOUS; SUBCUTANEOUS at 12:00

## 2017-06-19 RX ADMIN — Medication 10 ML: at 22:00

## 2017-06-19 RX ADMIN — Medication 10 ML: at 12:01

## 2017-06-19 RX ADMIN — SODIUM CHLORIDE, SODIUM LACTATE, POTASSIUM CHLORIDE, AND CALCIUM CHLORIDE 75 ML/HR: 600; 310; 30; 20 INJECTION, SOLUTION INTRAVENOUS at 04:23

## 2017-06-19 RX ADMIN — HYDROMORPHONE HYDROCHLORIDE 1 MG: 1 INJECTION, SOLUTION INTRAMUSCULAR; INTRAVENOUS; SUBCUTANEOUS at 16:06

## 2017-06-19 RX ADMIN — ONDANSETRON 4 MG: 2 INJECTION INTRAMUSCULAR; INTRAVENOUS at 16:06

## 2017-06-19 RX ADMIN — METOCLOPRAMIDE 5 MG: 5 INJECTION, SOLUTION INTRAMUSCULAR; INTRAVENOUS at 23:44

## 2017-06-19 RX ADMIN — MORPHINE SULFATE 60 MG: 60 TABLET, FILM COATED, EXTENDED RELEASE ORAL at 08:53

## 2017-06-19 RX ADMIN — ONDANSETRON 4 MG: 2 INJECTION INTRAMUSCULAR; INTRAVENOUS at 09:46

## 2017-06-19 RX ADMIN — SODIUM CHLORIDE 10 MG: 9 INJECTION INTRAMUSCULAR; INTRAVENOUS; SUBCUTANEOUS at 00:49

## 2017-06-19 RX ADMIN — MORPHINE SULFATE 30 MG: 15 TABLET ORAL at 05:52

## 2017-06-19 RX ADMIN — SIMETHICONE 20 MG: 20 SUSPENSION/ DROPS ORAL at 22:09

## 2017-06-19 RX ADMIN — DOCUSATE SODIUM 100 MG: 100 CAPSULE, LIQUID FILLED ORAL at 09:46

## 2017-06-19 RX ADMIN — METOCLOPRAMIDE 5 MG: 5 INJECTION, SOLUTION INTRAMUSCULAR; INTRAVENOUS at 07:39

## 2017-06-19 RX ADMIN — HYDROMORPHONE HYDROCHLORIDE 1 MG: 1 INJECTION, SOLUTION INTRAMUSCULAR; INTRAVENOUS; SUBCUTANEOUS at 12:00

## 2017-06-19 RX ADMIN — SIMETHICONE 20 MG: 20 SUSPENSION/ DROPS ORAL at 09:46

## 2017-06-19 RX ADMIN — ARFORMOTEROL TARTRATE 15 MCG: 15 SOLUTION RESPIRATORY (INHALATION) at 20:17

## 2017-06-19 RX ADMIN — MORPHINE SULFATE 30 MG: 15 TABLET ORAL at 18:47

## 2017-06-19 RX ADMIN — ARFORMOTEROL TARTRATE 15 MCG: 15 SOLUTION RESPIRATORY (INHALATION) at 08:11

## 2017-06-19 RX ADMIN — BUDESONIDE 500 MCG: 0.5 INHALANT RESPIRATORY (INHALATION) at 20:16

## 2017-06-19 RX ADMIN — BUDESONIDE 500 MCG: 0.5 INHALANT RESPIRATORY (INHALATION) at 08:11

## 2017-06-19 RX ADMIN — SODIUM CHLORIDE, SODIUM LACTATE, POTASSIUM CHLORIDE, AND CALCIUM CHLORIDE 75 ML/HR: 600; 310; 30; 20 INJECTION, SOLUTION INTRAVENOUS at 20:44

## 2017-06-19 NOTE — PROGRESS NOTES
Day # 3 of Vancomycin  Indication:  Abdominal wound infection s/p intraabdominal surgery, also s/p exp lap today   Current regimen:  2000 mg Q12H  Abx regimen: Vancomycin monotherapy    Recent Labs      17   0433  17   0421  17   0605   WBC  19.4*   --   12.5*   CREA   --   0.72  0.62   BUN   --   2*  1*     Est CrCl: > 100 ml/min    Temp (24hrs), Av.8 °F (37.7 °C), Min:99.4 °F (37.4 °C), Max:100.1 °F (37.8 °C)    Cultures:    Respiratory - MRSA (genesis=2), final (sensitive to Vancomycin)   Wound (surgical), MRSA by antigen detection, confirmation and sensitivity to follow-preliminary    Goal trough = 15 - 20 mcg/mL   @ 1159 = 17    Plan: Continue current regimen

## 2017-06-19 NOTE — PROGRESS NOTES
[address]  [address], [address]       GI PROGRESS NOTE  Zari Rangel [facility]  [phone]    NAME: Maria Dolores Casas   :  1975   MRN:  [id number]       Subjective:     Doing ok overall. Has been walking around, ostomy working. Incision now being packed. Objective:     VITALS:   Last 24hrs VS reviewed since prior progress note. Most recent are:  Visit Vitals    /69 (BP 1 Location: Left arm, BP Patient Position: At rest)    Pulse 93    Temp 99.8 °F (37.7 °C)    Resp 18    Ht 5' 4\" (1.626 m)    Wt 156 kg (343 lb 14.7 oz)    SpO2 96%    BMI 59.03 kg/m2       PHYSICAL EXAM:  General: Cooperative, no acute distress    Neurologic:  Alert and oriented X 3. HEENT: PERRLA, EOMI. Lungs:  No Wheezing  Heart:  s1 s2, Regular  rhythm,  No murmur   Abdomen: Soft, Non distended, dressing  Extremities: No edema  Psych:   Good insight. Not anxious nor agitated.     Lab Data Reviewed:     Recent Results (from the past 24 hour(s))   CULTURE, WOUND W GRAM STAIN    Collection Time: 17  6:15 PM   Result Value Ref Range    Special Requests: NO SPECIAL REQUESTS      GRAM STAIN OCCASIONAL  WBCS SEEN        GRAM STAIN OCCASIONAL  GRAM POSITIVE COCCI  IN PAIRS        Culture result: PENDING    CBC W/O DIFF    Collection Time: 17  4:33 AM   Result Value Ref Range    WBC 19.4 (H) 3.6 - 11.0 K/uL    RBC 3.83 3.80 - 5.20 M/uL    HGB 11.6 11.5 - 16.0 g/dL    HCT 35.9 35.0 - 47.0 %    MCV 93.7 80.0 - 99.0 FL    MCH 30.3 26.0 - 34.0 PG    MCHC 32.3 30.0 - 36.5 g/dL    RDW 14.6 (H) 11.5 - 14.5 %    PLATELET 343 078 - 651 K/uL         ________________________________________________________________________       Assessment:   · Wound infection - on vanc, wound being packed  · Crohn's disease - s/p open lap for small bowel perforation     Patient Active Problem List   Diagnosis Code    [facility]'s disease ([facility] K50.90    DVT (deep venous thrombosis) ([facility] I82.409    Incarcerated ventral hernia K46.0    Right flank pain R10.9    Perforated bowel ([address] K63.1     Plan:   · Following     Signed By: Javier Yang NP     6/19/2017  10:28 AM           GI Attending: Patient seen and examined. Agree with supportive measures and antibiotics per surgery team. Diet advancement per surgery team. Will continue to follow. Appreciate surgery team's care. Cain Guzman MD

## 2017-06-19 NOTE — PROGRESS NOTES
Progress Note    Patient: Sierra Montiel MRN: [id number]  SSN: [id number]    YOB: 1975  Age: 46 y.o. Sex: female      Admit Date: 2017    13 Days Post-Op    Procedure:  Procedure(s):  Diagnostic Laparoscopy, converted to open Exploratory Laparotomy, Lysis of adhesions for 7 hours/ Intraoperative Upper Endoscopy by Dr. Danae Munguia. Subjective:     No acute surgical issues. WBC trending up a bit. Wound cultures growing GPC. Still with purulent drainage from midline incision      Objective:     Visit Vitals    /69 (BP 1 Location: Left arm, BP Patient Position: At rest)    Pulse 93    Temp 99.8 °F (37.7 °C)    Resp 18    Ht 5' 4\" (1.626 m)    Wt 343 lb 14.7 oz (156 kg)    SpO2 96%    BMI 59.03 kg/m2       Temp (24hrs), Av.3 °F (37.4 °C), Min:98.7 °F (37.1 °C), Max:100.1 °F (37.8 °C)        Physical Exam:    Gen:  NAD  Pulm:  Unlabored  Abd:  S/ND/appropriate TTP  Wound:  Clean and intact with erythema to upper abdominal incision. 50 ml purulent fluid was expressed.     Recent Results (from the past 24 hour(s))   CULTURE, WOUND W GRAM STAIN    Collection Time: 17  6:15 PM   Result Value Ref Range    Special Requests: NO SPECIAL REQUESTS      GRAM STAIN OCCASIONAL  WBCS SEEN        GRAM STAIN OCCASIONAL  GRAM POSITIVE COCCI  IN PAIRS        Culture result: PENDING    CBC W/O DIFF    Collection Time: 17  4:33 AM   Result Value Ref Range    WBC 19.4 (H) 3.6 - 11.0 K/uL    RBC 3.83 3.80 - 5.20 M/uL    HGB 11.6 11.5 - 16.0 g/dL    HCT 35.9 35.0 - 47.0 %    MCV 93.7 80.0 - 99.0 FL    MCH 30.3 26.0 - 34.0 PG    MCHC 32.3 30.0 - 36.5 g/dL    RDW 14.6 (H) 11.5 - 14.5 %    PLATELET 968 834 - 239 K/uL             Assessment:     Hospital Problems  Date Reviewed: 2017          Codes Class Noted POA    Perforated bowel ([address] ICD-10-CM: K63.1  ICD-9-CM: 884.60  2017 Unknown              Plan/Recommendations/Medical Decision Making:     - Continue mechanical soft diet  - Cellulitis and abdominal wall abscess:  Continue Vancomycin. Follow-up culture results  - Out of bed as tolerated  - Pain control    ADDENDUM FOR CODING QUERY:    History of Crohn with inadequate protein-energy intake related to altered GI function from recent laparotomy with Albumin 1.9-2; Total Protein 5.3-5.6.   Will monitor nutritional status and advance to mechanical soft diet    Signed By: Clara Smith MD     June 19, 2017

## 2017-06-20 ENCOUNTER — HOME HEALTH ADMISSION (OUTPATIENT)
Dept: HOME HEALTH SERVICES | Facility: HOME HEALTH | Age: 42
End: 2017-06-20
Payer: MEDICARE

## 2017-06-20 LAB
ANION GAP BLD CALC-SCNC: 7 MMOL/L (ref 5–15)
BACTERIA SPEC CULT: ABNORMAL
BUN SERPL-MCNC: 3 MG/DL (ref 6–20)
BUN/CREAT SERPL: 4 (ref 12–20)
CALCIUM SERPL-MCNC: 8.2 MG/DL (ref 8.5–10.1)
CHLORIDE SERPL-SCNC: 102 MMOL/L (ref 97–108)
CO2 SERPL-SCNC: 31 MMOL/L (ref 21–32)
CREAT SERPL-MCNC: 0.75 MG/DL (ref 0.55–1.02)
ERYTHROCYTE [DISTWIDTH] IN BLOOD BY AUTOMATED COUNT: 14.6 % (ref 11.5–14.5)
GLUCOSE SERPL-MCNC: 76 MG/DL (ref 65–100)
GRAM STN SPEC: ABNORMAL
GRAM STN SPEC: ABNORMAL
HCT VFR BLD AUTO: 32.9 % (ref 35–47)
HGB BLD-MCNC: 10.5 G/DL (ref 11.5–16)
MCH RBC QN AUTO: 29.6 PG (ref 26–34)
MCHC RBC AUTO-ENTMCNC: 31.9 G/DL (ref 30–36.5)
MCV RBC AUTO: 92.7 FL (ref 80–99)
PLATELET # BLD AUTO: 278 K/UL (ref 150–400)
POTASSIUM SERPL-SCNC: 3.3 MMOL/L (ref 3.5–5.1)
RBC # BLD AUTO: 3.55 M/UL (ref 3.8–5.2)
SERVICE CMNT-IMP: ABNORMAL
SODIUM SERPL-SCNC: 140 MMOL/L (ref 136–145)
WBC # BLD AUTO: 11.4 K/UL (ref 3.6–11)

## 2017-06-20 PROCEDURE — 77030012935 HC DRSG AQUACEL BMS -B

## 2017-06-20 PROCEDURE — 36415 COLL VENOUS BLD VENIPUNCTURE: CPT | Performed by: SURGERY

## 2017-06-20 PROCEDURE — 94640 AIRWAY INHALATION TREATMENT: CPT

## 2017-06-20 PROCEDURE — 65210000002 HC RM PRIVATE GYN

## 2017-06-20 PROCEDURE — 74011000250 HC RX REV CODE- 250: Performed by: INTERNAL MEDICINE

## 2017-06-20 PROCEDURE — 85027 COMPLETE CBC AUTOMATED: CPT | Performed by: SURGERY

## 2017-06-20 PROCEDURE — 74011250637 HC RX REV CODE- 250/637: Performed by: SURGERY

## 2017-06-20 PROCEDURE — 74011250636 HC RX REV CODE- 250/636: Performed by: NURSE PRACTITIONER

## 2017-06-20 PROCEDURE — 74011250636 HC RX REV CODE- 250/636: Performed by: INTERNAL MEDICINE

## 2017-06-20 PROCEDURE — 74011000250 HC RX REV CODE- 250: Performed by: SURGERY

## 2017-06-20 PROCEDURE — 80048 BASIC METABOLIC PNL TOTAL CA: CPT | Performed by: SURGERY

## 2017-06-20 PROCEDURE — 74011250636 HC RX REV CODE- 250/636: Performed by: SURGERY

## 2017-06-20 PROCEDURE — 74011250637 HC RX REV CODE- 250/637: Performed by: PHYSICIAN ASSISTANT

## 2017-06-20 PROCEDURE — 74011250637 HC RX REV CODE- 250/637: Performed by: NURSE PRACTITIONER

## 2017-06-20 RX ORDER — POTASSIUM CHLORIDE 750 MG/1
20 TABLET, FILM COATED, EXTENDED RELEASE ORAL
Status: COMPLETED | OUTPATIENT
Start: 2017-06-20 | End: 2017-06-20

## 2017-06-20 RX ORDER — HYDROMORPHONE HYDROCHLORIDE 2 MG/1
2 TABLET ORAL
Status: DISCONTINUED | OUTPATIENT
Start: 2017-06-20 | End: 2017-06-21 | Stop reason: HOSPADM

## 2017-06-20 RX ADMIN — VANCOMYCIN HYDROCHLORIDE 2000 MG: 10 INJECTION, POWDER, LYOPHILIZED, FOR SOLUTION INTRAVENOUS at 11:08

## 2017-06-20 RX ADMIN — ONDANSETRON 4 MG: 2 INJECTION INTRAMUSCULAR; INTRAVENOUS at 18:20

## 2017-06-20 RX ADMIN — HYDROMORPHONE HYDROCHLORIDE 2 MG: 2 TABLET ORAL at 18:26

## 2017-06-20 RX ADMIN — ALTEPLASE 1 MG: 2.2 INJECTION, POWDER, LYOPHILIZED, FOR SOLUTION INTRAVENOUS at 06:04

## 2017-06-20 RX ADMIN — SODIUM CHLORIDE 10 MG: 9 INJECTION INTRAMUSCULAR; INTRAVENOUS; SUBCUTANEOUS at 22:39

## 2017-06-20 RX ADMIN — ONDANSETRON 4 MG: 4 TABLET, ORALLY DISINTEGRATING ORAL at 00:36

## 2017-06-20 RX ADMIN — DOCUSATE SODIUM 100 MG: 100 CAPSULE, LIQUID FILLED ORAL at 08:57

## 2017-06-20 RX ADMIN — MORPHINE SULFATE 60 MG: 60 TABLET, FILM COATED, EXTENDED RELEASE ORAL at 21:41

## 2017-06-20 RX ADMIN — MORPHINE SULFATE 60 MG: 60 TABLET, FILM COATED, EXTENDED RELEASE ORAL at 08:57

## 2017-06-20 RX ADMIN — MORPHINE SULFATE 30 MG: 15 TABLET ORAL at 04:50

## 2017-06-20 RX ADMIN — MORPHINE SULFATE 60 MG: 60 TABLET, FILM COATED, EXTENDED RELEASE ORAL at 15:21

## 2017-06-20 RX ADMIN — SODIUM CHLORIDE 10 MG: 9 INJECTION INTRAMUSCULAR; INTRAVENOUS; SUBCUTANEOUS at 08:57

## 2017-06-20 RX ADMIN — BUDESONIDE 500 MCG: 0.5 INHALANT RESPIRATORY (INHALATION) at 08:39

## 2017-06-20 RX ADMIN — PANTOPRAZOLE SODIUM 40 MG: 40 TABLET, DELAYED RELEASE ORAL at 07:10

## 2017-06-20 RX ADMIN — POTASSIUM CHLORIDE 20 MEQ: 750 TABLET, FILM COATED, EXTENDED RELEASE ORAL at 11:08

## 2017-06-20 RX ADMIN — Medication 20 ML: at 04:56

## 2017-06-20 RX ADMIN — ARFORMOTEROL TARTRATE 15 MCG: 15 SOLUTION RESPIRATORY (INHALATION) at 08:39

## 2017-06-20 RX ADMIN — HYDROMORPHONE HYDROCHLORIDE 1 MG: 1 INJECTION, SOLUTION INTRAMUSCULAR; INTRAVENOUS; SUBCUTANEOUS at 10:26

## 2017-06-20 RX ADMIN — ALTEPLASE 1 MG: 2.2 INJECTION, POWDER, LYOPHILIZED, FOR SOLUTION INTRAVENOUS at 08:29

## 2017-06-20 RX ADMIN — Medication 10 ML: at 22:00

## 2017-06-20 RX ADMIN — Medication 10 ML: at 11:09

## 2017-06-20 RX ADMIN — METOCLOPRAMIDE 5 MG: 5 INJECTION, SOLUTION INTRAMUSCULAR; INTRAVENOUS at 06:00

## 2017-06-20 RX ADMIN — SIMETHICONE 20 MG: 20 SUSPENSION/ DROPS ORAL at 11:11

## 2017-06-20 RX ADMIN — VANCOMYCIN HYDROCHLORIDE 2000 MG: 10 INJECTION, POWDER, LYOPHILIZED, FOR SOLUTION INTRAVENOUS at 04:48

## 2017-06-20 RX ADMIN — METOCLOPRAMIDE 5 MG: 5 INJECTION, SOLUTION INTRAMUSCULAR; INTRAVENOUS at 11:08

## 2017-06-20 RX ADMIN — DOCUSATE SODIUM 100 MG: 100 CAPSULE, LIQUID FILLED ORAL at 18:26

## 2017-06-20 RX ADMIN — Medication 10 ML: at 13:56

## 2017-06-20 RX ADMIN — METOCLOPRAMIDE 5 MG: 5 INJECTION, SOLUTION INTRAMUSCULAR; INTRAVENOUS at 23:54

## 2017-06-20 RX ADMIN — ONDANSETRON 4 MG: 4 TABLET, ORALLY DISINTEGRATING ORAL at 06:01

## 2017-06-20 RX ADMIN — HYDROMORPHONE HYDROCHLORIDE 2 MG: 2 TABLET ORAL at 14:03

## 2017-06-20 RX ADMIN — VANCOMYCIN HYDROCHLORIDE 2000 MG: 10 INJECTION, POWDER, LYOPHILIZED, FOR SOLUTION INTRAVENOUS at 23:54

## 2017-06-20 RX ADMIN — ALPRAZOLAM 1 MG: 1 TABLET ORAL at 20:27

## 2017-06-20 RX ADMIN — ONDANSETRON 4 MG: 2 INJECTION INTRAMUSCULAR; INTRAVENOUS at 13:56

## 2017-06-20 RX ADMIN — SODIUM CHLORIDE 10 MG: 9 INJECTION INTRAMUSCULAR; INTRAVENOUS; SUBCUTANEOUS at 15:27

## 2017-06-20 RX ADMIN — ENOXAPARIN SODIUM 40 MG: 40 INJECTION SUBCUTANEOUS at 08:57

## 2017-06-20 RX ADMIN — BUDESONIDE 500 MCG: 0.5 INHALANT RESPIRATORY (INHALATION) at 21:09

## 2017-06-20 RX ADMIN — ONDANSETRON 4 MG: 4 TABLET, ORALLY DISINTEGRATING ORAL at 21:41

## 2017-06-20 RX ADMIN — ARFORMOTEROL TARTRATE 15 MCG: 15 SOLUTION RESPIRATORY (INHALATION) at 21:09

## 2017-06-20 RX ADMIN — METOCLOPRAMIDE 5 MG: 5 INJECTION, SOLUTION INTRAMUSCULAR; INTRAVENOUS at 18:23

## 2017-06-20 NOTE — PROGRESS NOTES
Progress Note    Patient: Aziza Yip MRN: [id number]  SSN: [id number]    YOB: 1975  Age: 46 y.o. Sex: female      Admit Date: 2017    14 Days Post-Op    Procedure:  Procedure(s):  Diagnostic Laparoscopy, converted to open Exploratory Laparotomy, Lysis of adhesions for 7 hours/ Intraoperative Upper Endoscopy by Dr. Malika Barreto. Subjective:     No acute surgical issues. WBC trending down. Wound with less erythema. Still with some seropurulent drainage. Wound cultures growing MRSA. Objective:     Visit Vitals    /79 (BP 1 Location: Left arm, BP Patient Position: At rest)    Pulse 87    Temp 97.9 °F (36.6 °C)    Resp 18    Ht 5' 4\" (1.626 m)    Wt 343 lb 14.7 oz (156 kg)    SpO2 96%    BMI 59.03 kg/m2       Temp (24hrs), Av.5 °F (36.9 °C), Min:97.9 °F (36.6 °C), Max:99.1 °F (37.3 °C)        Physical Exam:    Gen:  NAD  Pulm:  Unlabored  Abd:  S/ND/appropriate TTP  Wound:  Clean and intact with erythema to upper abdominal incision.      Recent Results (from the past 24 hour(s))   CBC W/O DIFF    Collection Time: 17  7:21 AM   Result Value Ref Range    WBC 11.4 (H) 3.6 - 11.0 K/uL    RBC 3.55 (L) 3.80 - 5.20 M/uL    HGB 10.5 (L) 11.5 - 16.0 g/dL    HCT 32.9 (L) 35.0 - 47.0 %    MCV 92.7 80.0 - 99.0 FL    MCH 29.6 26.0 - 34.0 PG    MCHC 31.9 30.0 - 36.5 g/dL    RDW 14.6 (H) 11.5 - 14.5 %    PLATELET 164 476 - 108 K/uL   METABOLIC PANEL, BASIC    Collection Time: 17  7:21 AM   Result Value Ref Range    Sodium 140 136 - 145 mmol/L    Potassium 3.3 (L) 3.5 - 5.1 mmol/L    Chloride 102 97 - 108 mmol/L    CO2 31 21 - 32 mmol/L    Anion gap 7 5 - 15 mmol/L    Glucose 76 65 - 100 mg/dL    BUN 3 (L) 6 - 20 MG/DL    Creatinine 0.75 0.55 - 1.02 MG/DL    BUN/Creatinine ratio 4 (L) 12 - 20      GFR est AA >60 >60 ml/min/1.73m2    GFR est non-AA >60 >60 ml/min/1.73m2    Calcium 8.2 (L) 8.5 - 10.1 MG/DL             Assessment:     Hospital Problems  Date Reviewed: 6/6/2017          Codes Class Noted POA    Perforated bowel ([facility] [address] ICD-10-CM: K63.1  ICD-9-CM: 569.83  6/6/2017 Unknown              Plan/Recommendations/Medical Decision Making:     - Continue mechanical soft diet  - Cellulitis and abdominal wall abscess:  Continue Vancomycin. Follow-up culture results  - Out of bed as tolerated  - Pain control  - Possible DC home tomorrow with oral antibiotic and local wound care    ADDENDUM FOR CODING QUERY:    History of Crohn with inadequate protein-energy intake related to altered GI function from recent laparotomy with Albumin 1.9-2; Total Protein 5.3-5.6.   Will monitor nutritional status and advance to mechanical soft diet    Signed By: Dori Banks MD     June 20, 2017

## 2017-06-20 NOTE — WOUND CARE
WOCN Note:     New consult placed by RN for abdominal wound. Chart shows:  Admitted for retained foreign body with abdominal free air, lysis of adhesions 6/7/17 ; history of Crohn's, colectomy with ostomy, DVT  WBC = 11.4  On Vencomycin    Assessment:   Patient is A&O x 4, continent and mobile - ambulated from chair to bed for wound care. Patient reports no pain and tolerated dressing change well. 1. Abdominal surgical incision dehiscence = 3 x 1 x 7 cm in upper portion of incision with staples intact distally and well-approximated with slight erythema at staple insertions. Base slopes upward. Visible base is 100% moist pink, small serosanguinous exudate. Periwound with slight flare of blanching erythema. Packed with strip of Aquacel AG, dry topper and secured. Recommendations:    Every other day: please change Aquacel AG packing using one continuous strip, dry cover and secure. Discussed above plan with patient.     Transition of Care: Plan to follow weekly and as needed while admitted to hospital.    SONY Aguila, RN, [facility] [place]  Certified Wound, Ostomy, Continence Nurse  office [phone]  pager [phone] or call  to page

## 2017-06-20 NOTE — PROGRESS NOTES
Consult received for home health wound care. Met with pt at the bedside this PM to introduce role and obtain choice for home health company. With no preference, CM [facility] pt for home wound care services post discharge. Contact information to be added to AVS.  Anticipating discharge home tomorrow. Pt to return home with her mother, home wound care, and oral antibiotics per surgery. No barriers to discharge identified. CM will assist with scheduling follow up appointments in AM. Pt is aware to follow up with Dr. Amaya Solares, Dr. Luis Nicolas, and her pain specialist Dr. Tony Washburn. Will continue to follow.     ROBERT Villar

## 2017-06-20 NOTE — PROGRESS NOTES
Bedside shift change report given to Flakita Camilo (oncoming nurse) by Jax Kearns (offgoing nurse). Report included the following information SBAR and Kardex.

## 2017-06-21 VITALS
BODY MASS INDEX: 50.02 KG/M2 | WEIGHT: 293 LBS | DIASTOLIC BLOOD PRESSURE: 78 MMHG | HEIGHT: 64 IN | OXYGEN SATURATION: 97 % | HEART RATE: 94 BPM | TEMPERATURE: 97.7 F | SYSTOLIC BLOOD PRESSURE: 130 MMHG | RESPIRATION RATE: 17 BRPM

## 2017-06-21 LAB
ALBUMIN SERPL BCP-MCNC: 2 G/DL (ref 3.5–5)
ALBUMIN/GLOB SERPL: 0.5 {RATIO} (ref 1.1–2.2)
ALP SERPL-CCNC: 51 U/L (ref 45–117)
ALT SERPL-CCNC: 19 U/L (ref 12–78)
ANION GAP BLD CALC-SCNC: 9 MMOL/L (ref 5–15)
AST SERPL W P-5'-P-CCNC: 9 U/L (ref 15–37)
BILIRUB SERPL-MCNC: 0.4 MG/DL (ref 0.2–1)
BUN SERPL-MCNC: 3 MG/DL (ref 6–20)
BUN/CREAT SERPL: 4 (ref 12–20)
CALCIUM SERPL-MCNC: 8.8 MG/DL (ref 8.5–10.1)
CHLORIDE SERPL-SCNC: 104 MMOL/L (ref 97–108)
CO2 SERPL-SCNC: 29 MMOL/L (ref 21–32)
CREAT SERPL-MCNC: 0.82 MG/DL (ref 0.55–1.02)
ERYTHROCYTE [DISTWIDTH] IN BLOOD BY AUTOMATED COUNT: 14.7 % (ref 11.5–14.5)
GLOBULIN SER CALC-MCNC: 3.8 G/DL (ref 2–4)
GLUCOSE SERPL-MCNC: 72 MG/DL (ref 65–100)
HCT VFR BLD AUTO: 32.8 % (ref 35–47)
HGB BLD-MCNC: 10.1 G/DL (ref 11.5–16)
MCH RBC QN AUTO: 28.5 PG (ref 26–34)
MCHC RBC AUTO-ENTMCNC: 30.8 G/DL (ref 30–36.5)
MCV RBC AUTO: 92.7 FL (ref 80–99)
PLATELET # BLD AUTO: 311 K/UL (ref 150–400)
POTASSIUM SERPL-SCNC: 3.4 MMOL/L (ref 3.5–5.1)
PROT SERPL-MCNC: 5.8 G/DL (ref 6.4–8.2)
RBC # BLD AUTO: 3.54 M/UL (ref 3.8–5.2)
SODIUM SERPL-SCNC: 142 MMOL/L (ref 136–145)
WBC # BLD AUTO: 11.1 K/UL (ref 3.6–11)

## 2017-06-21 PROCEDURE — 85027 COMPLETE CBC AUTOMATED: CPT | Performed by: SURGERY

## 2017-06-21 PROCEDURE — 36415 COLL VENOUS BLD VENIPUNCTURE: CPT | Performed by: SURGERY

## 2017-06-21 PROCEDURE — 80053 COMPREHEN METABOLIC PANEL: CPT | Performed by: SURGERY

## 2017-06-21 PROCEDURE — 74011250636 HC RX REV CODE- 250/636: Performed by: SURGERY

## 2017-06-21 PROCEDURE — 74011250636 HC RX REV CODE- 250/636: Performed by: NURSE PRACTITIONER

## 2017-06-21 PROCEDURE — 74011250637 HC RX REV CODE- 250/637: Performed by: PHYSICIAN ASSISTANT

## 2017-06-21 PROCEDURE — 94640 AIRWAY INHALATION TREATMENT: CPT

## 2017-06-21 PROCEDURE — 74011000250 HC RX REV CODE- 250: Performed by: SURGERY

## 2017-06-21 PROCEDURE — 74011250637 HC RX REV CODE- 250/637: Performed by: SURGERY

## 2017-06-21 RX ORDER — CLINDAMYCIN HYDROCHLORIDE 300 MG/1
300 CAPSULE ORAL 3 TIMES DAILY
Qty: 21 CAP | Refills: 0 | Status: SHIPPED | OUTPATIENT
Start: 2017-06-21 | End: 2017-08-24

## 2017-06-21 RX ORDER — HYDROMORPHONE HYDROCHLORIDE 2 MG/1
2 TABLET ORAL
Qty: 40 TAB | Refills: 0 | Status: SHIPPED | OUTPATIENT
Start: 2017-06-21 | End: 2017-08-24

## 2017-06-21 RX ADMIN — PANTOPRAZOLE SODIUM 40 MG: 40 TABLET, DELAYED RELEASE ORAL at 07:30

## 2017-06-21 RX ADMIN — HYDROMORPHONE HYDROCHLORIDE 2 MG: 2 TABLET ORAL at 05:00

## 2017-06-21 RX ADMIN — SODIUM CHLORIDE, SODIUM LACTATE, POTASSIUM CHLORIDE, AND CALCIUM CHLORIDE 75 ML/HR: 600; 310; 30; 20 INJECTION, SOLUTION INTRAVENOUS at 04:59

## 2017-06-21 RX ADMIN — ONDANSETRON 4 MG: 4 TABLET, ORALLY DISINTEGRATING ORAL at 05:22

## 2017-06-21 RX ADMIN — ENOXAPARIN SODIUM 40 MG: 40 INJECTION SUBCUTANEOUS at 08:43

## 2017-06-21 RX ADMIN — DOCUSATE SODIUM 100 MG: 100 CAPSULE, LIQUID FILLED ORAL at 08:43

## 2017-06-21 RX ADMIN — HYDROMORPHONE HYDROCHLORIDE 2 MG: 2 TABLET ORAL at 08:42

## 2017-06-21 RX ADMIN — Medication 10 ML: at 05:00

## 2017-06-21 RX ADMIN — ARFORMOTEROL TARTRATE 15 MCG: 15 SOLUTION RESPIRATORY (INHALATION) at 09:00

## 2017-06-21 RX ADMIN — BUDESONIDE 500 MCG: 0.5 INHALANT RESPIRATORY (INHALATION) at 09:00

## 2017-06-21 RX ADMIN — METOCLOPRAMIDE 5 MG: 5 INJECTION, SOLUTION INTRAMUSCULAR; INTRAVENOUS at 05:00

## 2017-06-21 RX ADMIN — ONDANSETRON 4 MG: 4 TABLET, ORALLY DISINTEGRATING ORAL at 09:58

## 2017-06-21 NOTE — PROGRESS NOTES
Progress Note    Patient: Teresa Vale MRN: [id number]  SSN: [id number]    YOB: 1975  Age: 46 y.o. Sex: female      Admit Date: 2017    15 Days Post-Op    Procedure:  Procedure(s):  Diagnostic Laparoscopy, converted to open Exploratory Laparotomy, Lysis of adhesions for 7 hours/ Intraoperative Upper Endoscopy by Dr. Lidya Denny. Subjective:     No acute surgical issues. WBC trending down. Pt feeling well. There was seropurulent fluid in the wound with skin sloughing at upper midline site. Wound cultures growing MRSA. Objective:     Visit Vitals    /78 (BP 1 Location: Left arm, BP Patient Position: At rest)    Pulse 94    Temp 97.7 °F (36.5 °C)    Resp 17    Ht 5' 4\" (1.626 m)    Wt 343 lb 14.7 oz (156 kg)    SpO2 97%    BMI 59.03 kg/m2       Temp (24hrs), Av.2 °F (36.8 °C), Min:97.7 °F (36.5 °C), Max:98.6 °F (37 °C)        Physical Exam:    Gen:  NAD  Pulm:  Unlabored  Abd:  S/ND/appropriate TTP  Wound:  Clean and intact with erythema to upper abdominal incision. Recent Results (from the past 24 hour(s))   METABOLIC PANEL, COMPREHENSIVE    Collection Time: 17  4:59 AM   Result Value Ref Range    Sodium 142 136 - 145 mmol/L    Potassium 3.4 (L) 3.5 - 5.1 mmol/L    Chloride 104 97 - 108 mmol/L    CO2 29 21 - 32 mmol/L    Anion gap 9 5 - 15 mmol/L    Glucose 72 65 - 100 mg/dL    BUN 3 (L) 6 - 20 MG/DL    Creatinine 0.82 0.55 - 1.02 MG/DL    BUN/Creatinine ratio 4 (L) 12 - 20      GFR est AA >60 >60 ml/min/1.73m2    GFR est non-AA >60 >60 ml/min/1.73m2    Calcium 8.8 8.5 - 10.1 MG/DL    Bilirubin, total 0.4 0.2 - 1.0 MG/DL    ALT (SGPT) 19 12 - 78 U/L    AST (SGOT) 9 (L) 15 - 37 U/L    Alk.  phosphatase 51 45 - 117 U/L    Protein, total 5.8 (L) 6.4 - 8.2 g/dL    Albumin 2.0 (L) 3.5 - 5.0 g/dL    Globulin 3.8 2.0 - 4.0 g/dL    A-G Ratio 0.5 (L) 1.1 - 2.2     CBC W/O DIFF    Collection Time: 17  4:59 AM   Result Value Ref Range    WBC 11.1 (H) 3.6 - 11.0 K/uL    RBC 3.54 (L) 3.80 - 5.20 M/uL    HGB 10.1 (L) 11.5 - 16.0 g/dL    HCT 32.8 (L) 35.0 - 47.0 %    MCV 92.7 80.0 - 99.0 FL    MCH 28.5 26.0 - 34.0 PG    MCHC 30.8 30.0 - 36.5 g/dL    RDW 14.7 (H) 11.5 - 14.5 %    PLATELET 620 113 - 188 K/uL             Assessment:     Hospital Problems  Date Reviewed: 6/6/2017          Codes Class Noted POA    Perforated bowel ([facility] [address] ICD-10-CM: K63.1  ICD-9-CM: 569.83  6/6/2017 Unknown              Plan/Recommendations/Medical Decision Making:     - Continue mechanical soft diet  - Cellulitis and abdominal wall abscess:  Continue Vancomycin inpatient and clinda at home. - Out of bed as tolerated  - Pain control  - Plan DC home with local wound care and clindamycin antibiotic today    ADDENDUM FOR CODING QUERY:    History of Crohn with inadequate protein-energy intake related to altered GI function from recent laparotomy with Albumin 1.9-2; Total Protein 5.3-5.6.   Will monitor nutritional status and advance to mechanical soft diet    Signed By: Mario Sierra MD     June 21, 2017

## 2017-06-21 NOTE — PROGRESS NOTES
Bedside and Verbal shift change report given to Perfecto Jain (oncoming nurse) by Renny Redding (offgoing nurse). Report included the following information SBAR, Kardex, Intake/Output, MAR, Accordion and Recent Results.

## 2017-06-21 NOTE — PROGRESS NOTES
I have reviewed discharge instructions with the patient. The patient verbalized understanding. Also gave the patient 2 prescriptions.

## 2017-06-21 NOTE — PROGRESS NOTES
Bedside shift change report given to MAURI Bailey (oncoming nurse) by Gigi Contreras (offgoing nurse). Report included the following information SBAR, Kardex, Procedure Summary, Intake/Output, MAR and Accordion.

## 2017-06-21 NOTE — DISCHARGE INSTRUCTIONS
1.  Do not drive while on pain medication. You should take a stool softener while on pain medication to prevent constipation. 2.  Do not lift anything greater than 15 pounds for 3 weeks. 3.  You may resume your home medications. 4.  You may shower but do not swim or soak in tub until your abdominal wound has healed. 5.  You will need to take Clindamycin antibiotic for 7 days. Make sure you eat a cup of yogurt or take a probiotic while on antibiotic to prevent diarrhea. 6.  You will need to call [phone] to schedule a follow-up with Dr. Unruly Mcdowell within 2 weeks. Infection After Surgery: Care Instructions  Your Care Instructions  After surgery, an infection is always possible. It doesn't mean that the surgery didn't go well. Because an infection can be serious, your doctor has taken steps to manage it. Your doctor checked the infection and cleaned it if necessary. He or she may have made an opening in the area so that the pus can drain out. You may have gauze in the cut so that the area will stay open and keep draining. You may need antibiotics. You will need to follow up with your doctor to make sure the infection has gone away. Follow-up care is a key part of your treatment and safety. Be sure to make and go to all appointments, and call your doctor if you are having problems. It's also a good idea to know your test results and keep a list of the medicines you take. How can you care for yourself at home? · Make sure your surgeon knows that you saw a doctor about the infection. · If your doctor prescribed antibiotics, take them as directed. Do not stop taking them just because you feel better. You need to take the full course of antibiotics. · Ask your doctor if you can take an over-the-counter pain medicine, such as acetaminophen (Tylenol), ibuprofen (Advil, Motrin), or naproxen (Aleve). Be safe with medicines. Read and follow all instructions on the label.   · Do not take two or more pain medicines at the same time unless the doctor told you to. Many pain medicines have acetaminophen, which is Tylenol. Too much acetaminophen (Tylenol) can be harmful. · Prop up the area on a pillow anytime you sit or lie down during the next 3 days. Try to keep it above the level of your heart. This will help reduce swelling. · Keep the skin clean and dry. · If you have a bandage, keep it clean and dry. · You may have a dressing over the cut (incision). A dressing helps the incision heal and protects it. Your doctor will tell you how to take care of this. You can expect drainage from the wound. · If your doctor told you how to care for your incision, follow your doctor's instructions. If you did not get instructions, follow this general advice:  ¨ Wash around the incision with clean water 2 times a day. Don't use hydrogen peroxide or alcohol, which can slow healing. When should you call for help? Call your doctor now or seek immediate medical care if:  · You have signs that your infection is getting worse, such as:  ¨ Increased pain, swelling, warmth, or redness in the area. ¨ Red streaks leading from the area. ¨ Pus draining from the wound. ¨ A new or higher fever. Watch closely for changes in your health, and be sure to contact your doctor if you have any problems. Where can you learn more? Go to http://cornell-terrell.info/. Enter C340 in the search box to learn more about \"Infection After Surgery: Care Instructions. \"  Current as of: March 20, 2017  Content Version: 11.3  © 7564-0128 [employer]. Care instructions adapted under license by [employer] (which disclaims liability or warranty for this information). If you have questions about a medical condition or this instruction, always ask your healthcare professional. [address] any warranty or liability for your use of this information.

## 2017-06-21 NOTE — PROGRESS NOTES
[address]  [address], [address]       GI PROGRESS NOTE  [facility]  [phone]    NAME: Nikhil Rudolph   :  1975   MRN:  [id number]       Subjective:     Doing well, walking around the room and sitting in the chair. Desperately wants to go home, tearful about this. Pain is controlled and eating better though appetite is still poor. Objective:     VITALS:   Last 24hrs VS reviewed since prior progress note. Most recent are:  Visit Vitals    /78 (BP 1 Location: Left arm, BP Patient Position: At rest)    Pulse 94    Temp 97.7 °F (36.5 °C)    Resp 17    Ht 5' 4\" (1.626 m)    Wt 156 kg (343 lb 14.7 oz)    SpO2 97%    BMI 59.03 kg/m2       PHYSICAL EXAM:  General: Cooperative, no acute distress    Neurologic:  Alert and oriented X 3. HEENT: PERRLA, EOMI. Lungs:  No Wheezing  Heart:  s1 s2, Regular  rhythm,  No murmur   Abdomen: Soft, Non distended, dressing to midline  Psych:   Good insight. tearful    Lab Data Reviewed:     Recent Results (from the past 24 hour(s))   METABOLIC PANEL, COMPREHENSIVE    Collection Time: 17  4:59 AM   Result Value Ref Range    Sodium 142 136 - 145 mmol/L    Potassium 3.4 (L) 3.5 - 5.1 mmol/L    Chloride 104 97 - 108 mmol/L    CO2 29 21 - 32 mmol/L    Anion gap 9 5 - 15 mmol/L    Glucose 72 65 - 100 mg/dL    BUN 3 (L) 6 - 20 MG/DL    Creatinine 0.82 0.55 - 1.02 MG/DL    BUN/Creatinine ratio 4 (L) 12 - 20      GFR est AA >60 >60 ml/min/1.73m2    GFR est non-AA >60 >60 ml/min/1.73m2    Calcium 8.8 8.5 - 10.1 MG/DL    Bilirubin, total 0.4 0.2 - 1.0 MG/DL    ALT (SGPT) 19 12 - 78 U/L    AST (SGOT) 9 (L) 15 - 37 U/L    Alk.  phosphatase 51 45 - 117 U/L    Protein, total 5.8 (L) 6.4 - 8.2 g/dL    Albumin 2.0 (L) 3.5 - 5.0 g/dL    Globulin 3.8 2.0 - 4.0 g/dL    A-G Ratio 0.5 (L) 1.1 - 2.2     CBC W/O DIFF    Collection Time: 17  4:59 AM   Result Value Ref Range    WBC 11.1 (H) 3.6 - 11.0 K/uL    RBC 3.54 (L) 3.80 - 5.20 M/uL    HGB 10.1 (L) 11.5 - 16.0 g/dL    HCT 32.8 (L) 35.0 - 47.0 %    MCV 92.7 80.0 - 99.0 FL    MCH 28.5 26.0 - 34.0 PG    MCHC 30.8 30.0 - 36.5 g/dL    RDW 14.7 (H) 11.5 - 14.5 %    PLATELET 847 947 - 835 K/uL         ________________________________________________________________________       Assessment:   · Wound infection - wound care and abx per surgery  · Crohn's disease s/p small bowel perforation with open laparotomy     Patient Active Problem List   Diagnosis Code    Crohn's disease ([facility] [address] K50.90    DVT (deep venous thrombosis) ([facility] [address] I82.409    Incarcerated ventral hernia K46.0    Right flank pain R10.9    Perforated bowel ([facility] [address] K63.1     Plan:   · Hopefully home soon with home health  · We will resume Crohn's medications as an out patient     Signed By: Viktoriya Madrigal NP     6/21/2017  8:57 AM           GI Attending: Agree with above. Appreciate Surgery team's care. Will follow along. Outpatient follow up with Dr. Rufina Huerta to discuss Crohn's management. Cain Vigil MD

## 2017-06-22 ENCOUNTER — HOME CARE VISIT (OUTPATIENT)
Dept: SCHEDULING | Facility: HOME HEALTH | Age: 42
End: 2017-06-22
Payer: MEDICARE

## 2017-06-22 VITALS
HEIGHT: 64 IN | DIASTOLIC BLOOD PRESSURE: 75 MMHG | TEMPERATURE: 98.2 F | WEIGHT: 293 LBS | OXYGEN SATURATION: 97 % | SYSTOLIC BLOOD PRESSURE: 128 MMHG | RESPIRATION RATE: 18 BRPM | BODY MASS INDEX: 50.02 KG/M2 | HEART RATE: 95 BPM

## 2017-06-22 PROCEDURE — 3331090001 HH PPS REVENUE CREDIT

## 2017-06-22 PROCEDURE — 3331090002 HH PPS REVENUE DEBIT

## 2017-06-22 PROCEDURE — 400013 HH SOC

## 2017-06-22 PROCEDURE — G0299 HHS/HOSPICE OF RN EA 15 MIN: HCPCS

## 2017-06-23 ENCOUNTER — HOME CARE VISIT (OUTPATIENT)
Dept: SCHEDULING | Facility: HOME HEALTH | Age: 42
End: 2017-06-23
Payer: MEDICARE

## 2017-06-23 PROCEDURE — 3331090001 HH PPS REVENUE CREDIT

## 2017-06-23 PROCEDURE — A4649 SURGICAL SUPPLIES: HCPCS

## 2017-06-23 PROCEDURE — A5120 SKIN BARRIER, WIPE OR SWAB: HCPCS

## 2017-06-23 PROCEDURE — A4452 WATERPROOF TAPE: HCPCS

## 2017-06-23 PROCEDURE — G0300 HHS/HOSPICE OF LPN EA 15 MIN: HCPCS

## 2017-06-23 PROCEDURE — A4216 STERILE WATER/SALINE, 10 ML: HCPCS

## 2017-06-23 PROCEDURE — 3331090002 HH PPS REVENUE DEBIT

## 2017-06-23 PROCEDURE — A6252 ABSORPT DRG >16 <=48 W/O BDR: HCPCS

## 2017-06-24 ENCOUNTER — HOME CARE VISIT (OUTPATIENT)
Dept: SCHEDULING | Facility: HOME HEALTH | Age: 42
End: 2017-06-24
Payer: MEDICARE

## 2017-06-24 VITALS
SYSTOLIC BLOOD PRESSURE: 108 MMHG | OXYGEN SATURATION: 97 % | DIASTOLIC BLOOD PRESSURE: 70 MMHG | RESPIRATION RATE: 20 BRPM | TEMPERATURE: 97.2 F | HEART RATE: 119 BPM

## 2017-06-24 VITALS
SYSTOLIC BLOOD PRESSURE: 118 MMHG | DIASTOLIC BLOOD PRESSURE: 70 MMHG | HEART RATE: 116 BPM | OXYGEN SATURATION: 97 % | RESPIRATION RATE: 20 BRPM | TEMPERATURE: 97.7 F

## 2017-06-24 PROCEDURE — 3331090002 HH PPS REVENUE DEBIT

## 2017-06-24 PROCEDURE — G0300 HHS/HOSPICE OF LPN EA 15 MIN: HCPCS

## 2017-06-24 PROCEDURE — 3331090001 HH PPS REVENUE CREDIT

## 2017-06-25 ENCOUNTER — HOME CARE VISIT (OUTPATIENT)
Dept: HOME HEALTH SERVICES | Facility: HOME HEALTH | Age: 42
End: 2017-06-25
Payer: MEDICARE

## 2017-06-25 ENCOUNTER — APPOINTMENT (OUTPATIENT)
Dept: CT IMAGING | Age: 42
End: 2017-06-25
Attending: PHYSICIAN ASSISTANT
Payer: MEDICARE

## 2017-06-25 ENCOUNTER — HOSPITAL ENCOUNTER (INPATIENT)
Age: 42
LOS: 60 days | Discharge: NURSING, REHAB OR LONG-TERM CARE | DRG: 335 | End: 2017-08-24
Attending: SURGERY | Admitting: SURGERY
Payer: MEDICARE

## 2017-06-25 ENCOUNTER — HOSPITAL ENCOUNTER (EMERGENCY)
Age: 42
Discharge: OTHER INSTITUTION | End: 2017-06-25
Attending: EMERGENCY MEDICINE
Payer: MEDICARE

## 2017-06-25 ENCOUNTER — HOME CARE VISIT (OUTPATIENT)
Dept: SCHEDULING | Facility: HOME HEALTH | Age: 42
End: 2017-06-25
Payer: MEDICARE

## 2017-06-25 VITALS
SYSTOLIC BLOOD PRESSURE: 98 MMHG | OXYGEN SATURATION: 95 % | HEART RATE: 97 BPM | DIASTOLIC BLOOD PRESSURE: 72 MMHG | RESPIRATION RATE: 20 BRPM | TEMPERATURE: 97.3 F

## 2017-06-25 VITALS
WEIGHT: 293 LBS | OXYGEN SATURATION: 94 % | HEART RATE: 95 BPM | BODY MASS INDEX: 58.88 KG/M2 | SYSTOLIC BLOOD PRESSURE: 154 MMHG | DIASTOLIC BLOOD PRESSURE: 89 MMHG | RESPIRATION RATE: 20 BRPM | TEMPERATURE: 99.3 F

## 2017-06-25 DIAGNOSIS — R11.2 NON-INTRACTABLE VOMITING WITH NAUSEA, UNSPECIFIED VOMITING TYPE: ICD-10-CM

## 2017-06-25 DIAGNOSIS — K50.018 CROHN'S DISEASE OF SMALL INTESTINE WITH OTHER COMPLICATION (HCC): ICD-10-CM

## 2017-06-25 DIAGNOSIS — K59.03 CONSTIPATION DUE TO OPIOID THERAPY: ICD-10-CM

## 2017-06-25 DIAGNOSIS — R53.81 DEBILITY: ICD-10-CM

## 2017-06-25 DIAGNOSIS — K63.2 ENTEROCUTANEOUS FISTULA: ICD-10-CM

## 2017-06-25 DIAGNOSIS — F41.9 ANXIETY: ICD-10-CM

## 2017-06-25 DIAGNOSIS — R10.84 GENERALIZED ABDOMINAL PAIN: ICD-10-CM

## 2017-06-25 DIAGNOSIS — T40.2X5A CONSTIPATION DUE TO OPIOID THERAPY: ICD-10-CM

## 2017-06-25 DIAGNOSIS — F41.8 ANXIETY ABOUT HEALTH: ICD-10-CM

## 2017-06-25 DIAGNOSIS — R11.0 NAUSEA: ICD-10-CM

## 2017-06-25 DIAGNOSIS — R10.32 ABDOMINAL PAIN, LLQ (LEFT LOWER QUADRANT): ICD-10-CM

## 2017-06-25 DIAGNOSIS — R06.02 SHORTNESS OF BREATH: ICD-10-CM

## 2017-06-25 DIAGNOSIS — D72.825 BANDEMIA: Primary | ICD-10-CM

## 2017-06-25 PROBLEM — R10.9 ABDOMINAL PAIN: Status: ACTIVE | Noted: 2017-06-25

## 2017-06-25 LAB
ALBUMIN SERPL BCP-MCNC: 2.5 G/DL (ref 3.5–5)
ALBUMIN/GLOB SERPL: 0.5 {RATIO} (ref 1.1–2.2)
ALP SERPL-CCNC: 70 U/L (ref 45–117)
ALT SERPL-CCNC: 20 U/L (ref 12–78)
ANION GAP BLD CALC-SCNC: 8 MMOL/L (ref 5–15)
AST SERPL W P-5'-P-CCNC: 17 U/L (ref 15–37)
BASOPHILS # BLD AUTO: 0.2 K/UL
BASOPHILS # BLD: 1 %
BILIRUB SERPL-MCNC: 0.4 MG/DL (ref 0.2–1)
BUN SERPL-MCNC: 8 MG/DL (ref 6–20)
BUN/CREAT SERPL: 7 (ref 12–20)
CALCIUM SERPL-MCNC: 9.1 MG/DL (ref 8.5–10.1)
CHLORIDE SERPL-SCNC: 102 MMOL/L (ref 97–108)
CO2 SERPL-SCNC: 28 MMOL/L (ref 21–32)
CREAT SERPL-MCNC: 1.22 MG/DL (ref 0.55–1.02)
DIFFERENTIAL METHOD BLD: ABNORMAL
EOSINOPHIL # BLD: 0 K/UL
EOSINOPHIL NFR BLD: 0 %
ERYTHROCYTE [DISTWIDTH] IN BLOOD BY AUTOMATED COUNT: 14.9 % (ref 11.5–14.5)
GLOBULIN SER CALC-MCNC: 5.4 G/DL (ref 2–4)
GLUCOSE SERPL-MCNC: 114 MG/DL (ref 65–100)
HCT VFR BLD AUTO: 38.1 % (ref 35–47)
HGB BLD-MCNC: 12.9 G/DL (ref 11.5–16)
LIPASE SERPL-CCNC: 67 U/L (ref 73–393)
LYMPHOCYTES # BLD AUTO: 25 %
LYMPHOCYTES # BLD: 4.7 K/UL
MCH RBC QN AUTO: 30.5 PG (ref 26–34)
MCHC RBC AUTO-ENTMCNC: 33.9 G/DL (ref 30–36.5)
MCV RBC AUTO: 90.1 FL (ref 80–99)
METAMYELOCYTES NFR BLD MANUAL: 2 %
MONOCYTES # BLD: 0.9 K/UL
MONOCYTES NFR BLD AUTO: 5 %
MYELOCYTES NFR BLD MANUAL: 1 %
NEUTS BAND NFR BLD MANUAL: 6 %
NEUTS SEG # BLD: 12.4 K/UL
NEUTS SEG NFR BLD AUTO: 60 %
NRBC # BLD: 0 K/UL (ref 0–0.01)
NRBC BLD-RTO: 0 PER 100 WBC
PLATELET # BLD AUTO: 312 K/UL (ref 150–400)
POTASSIUM SERPL-SCNC: 4.7 MMOL/L (ref 3.5–5.1)
PROT SERPL-MCNC: 7.9 G/DL (ref 6.4–8.2)
RBC # BLD AUTO: 4.23 M/UL (ref 3.8–5.2)
RBC MORPH BLD: ABNORMAL
SODIUM SERPL-SCNC: 138 MMOL/L (ref 136–145)
WBC # BLD AUTO: 18.8 K/UL (ref 3.6–11)
WBC MORPH BLD: ABNORMAL

## 2017-06-25 PROCEDURE — 65270000029 HC RM PRIVATE

## 2017-06-25 PROCEDURE — 96375 TX/PRO/DX INJ NEW DRUG ADDON: CPT

## 2017-06-25 PROCEDURE — 96374 THER/PROPH/DIAG INJ IV PUSH: CPT

## 2017-06-25 PROCEDURE — G0300 HHS/HOSPICE OF LPN EA 15 MIN: HCPCS

## 2017-06-25 PROCEDURE — 96361 HYDRATE IV INFUSION ADD-ON: CPT

## 2017-06-25 PROCEDURE — 74011636320 HC RX REV CODE- 636/320: Performed by: RADIOLOGY

## 2017-06-25 PROCEDURE — 85025 COMPLETE CBC W/AUTO DIFF WBC: CPT | Performed by: EMERGENCY MEDICINE

## 2017-06-25 PROCEDURE — 74011250637 HC RX REV CODE- 250/637: Performed by: SURGERY

## 2017-06-25 PROCEDURE — 74011000258 HC RX REV CODE- 258: Performed by: SURGERY

## 2017-06-25 PROCEDURE — 80053 COMPREHEN METABOLIC PANEL: CPT | Performed by: EMERGENCY MEDICINE

## 2017-06-25 PROCEDURE — 96376 TX/PRO/DX INJ SAME DRUG ADON: CPT

## 2017-06-25 PROCEDURE — 74011250636 HC RX REV CODE- 250/636: Performed by: PHYSICIAN ASSISTANT

## 2017-06-25 PROCEDURE — 83690 ASSAY OF LIPASE: CPT | Performed by: PHYSICIAN ASSISTANT

## 2017-06-25 PROCEDURE — 36415 COLL VENOUS BLD VENIPUNCTURE: CPT | Performed by: EMERGENCY MEDICINE

## 2017-06-25 PROCEDURE — 74177 CT ABD & PELVIS W/CONTRAST: CPT

## 2017-06-25 PROCEDURE — 3331090003 HH PPS REVENUE ADJ

## 2017-06-25 PROCEDURE — 74011250636 HC RX REV CODE- 250/636: Performed by: EMERGENCY MEDICINE

## 2017-06-25 PROCEDURE — 99284 EMERGENCY DEPT VISIT MOD MDM: CPT

## 2017-06-25 PROCEDURE — 3331090001 HH PPS REVENUE CREDIT

## 2017-06-25 PROCEDURE — 74011000250 HC RX REV CODE- 250: Performed by: SURGERY

## 2017-06-25 PROCEDURE — 74011250636 HC RX REV CODE- 250/636: Performed by: SURGERY

## 2017-06-25 PROCEDURE — 3331090002 HH PPS REVENUE DEBIT

## 2017-06-25 PROCEDURE — 96372 THER/PROPH/DIAG INJ SC/IM: CPT

## 2017-06-25 RX ORDER — DIPHENHYDRAMINE HYDROCHLORIDE 50 MG/ML
12.5 INJECTION, SOLUTION INTRAMUSCULAR; INTRAVENOUS
Status: DISCONTINUED | OUTPATIENT
Start: 2017-06-25 | End: 2017-08-24 | Stop reason: HOSPADM

## 2017-06-25 RX ORDER — ONDANSETRON 2 MG/ML
4 INJECTION INTRAMUSCULAR; INTRAVENOUS
Status: COMPLETED | OUTPATIENT
Start: 2017-06-25 | End: 2017-06-25

## 2017-06-25 RX ORDER — MORPHINE SULFATE 60 MG/1
60 TABLET, FILM COATED, EXTENDED RELEASE ORAL 3 TIMES DAILY
Status: DISCONTINUED | OUTPATIENT
Start: 2017-06-25 | End: 2017-06-29

## 2017-06-25 RX ORDER — HYDROMORPHONE HYDROCHLORIDE 1 MG/ML
1 INJECTION, SOLUTION INTRAMUSCULAR; INTRAVENOUS; SUBCUTANEOUS
Status: DISCONTINUED | OUTPATIENT
Start: 2017-06-25 | End: 2017-06-25

## 2017-06-25 RX ORDER — PROCHLORPERAZINE EDISYLATE 5 MG/ML
10 INJECTION INTRAMUSCULAR; INTRAVENOUS
Status: COMPLETED | OUTPATIENT
Start: 2017-06-25 | End: 2017-06-25

## 2017-06-25 RX ORDER — HYDROMORPHONE HYDROCHLORIDE 1 MG/ML
1 INJECTION, SOLUTION INTRAMUSCULAR; INTRAVENOUS; SUBCUTANEOUS
Status: COMPLETED | OUTPATIENT
Start: 2017-06-25 | End: 2017-06-25

## 2017-06-25 RX ORDER — MORPHINE SULFATE 4 MG/ML
4 INJECTION, SOLUTION INTRAMUSCULAR; INTRAVENOUS
Status: COMPLETED | OUTPATIENT
Start: 2017-06-25 | End: 2017-06-25

## 2017-06-25 RX ORDER — ALBUTEROL SULFATE 0.83 MG/ML
2.5 SOLUTION RESPIRATORY (INHALATION)
Status: DISCONTINUED | OUTPATIENT
Start: 2017-06-25 | End: 2017-06-30

## 2017-06-25 RX ORDER — MORPHINE SULFATE 30 MG/1
30 TABLET ORAL
Status: DISCONTINUED | OUTPATIENT
Start: 2017-06-25 | End: 2017-06-27

## 2017-06-25 RX ORDER — ONDANSETRON 2 MG/ML
4 INJECTION INTRAMUSCULAR; INTRAVENOUS
Status: DISCONTINUED | OUTPATIENT
Start: 2017-06-25 | End: 2017-06-26

## 2017-06-25 RX ORDER — HYDROMORPHONE HYDROCHLORIDE 1 MG/ML
2 INJECTION, SOLUTION INTRAMUSCULAR; INTRAVENOUS; SUBCUTANEOUS
Status: COMPLETED | OUTPATIENT
Start: 2017-06-25 | End: 2017-06-25

## 2017-06-25 RX ORDER — SODIUM CHLORIDE, SODIUM LACTATE, POTASSIUM CHLORIDE, CALCIUM CHLORIDE 600; 310; 30; 20 MG/100ML; MG/100ML; MG/100ML; MG/100ML
150 INJECTION, SOLUTION INTRAVENOUS CONTINUOUS
Status: DISCONTINUED | OUTPATIENT
Start: 2017-06-25 | End: 2017-06-26

## 2017-06-25 RX ORDER — ALPRAZOLAM 0.5 MG/1
1 TABLET ORAL
Status: DISCONTINUED | OUTPATIENT
Start: 2017-06-25 | End: 2017-06-29

## 2017-06-25 RX ORDER — ALBUTEROL SULFATE 4 MG/1
4 TABLET, FILM COATED, EXTENDED RELEASE ORAL
Status: DISCONTINUED | OUTPATIENT
Start: 2017-06-25 | End: 2017-06-29

## 2017-06-25 RX ORDER — VANCOMYCIN 2 GRAM/500 ML IN 0.9 % SODIUM CHLORIDE INTRAVENOUS
2000 EVERY 12 HOURS
Status: DISCONTINUED | OUTPATIENT
Start: 2017-06-26 | End: 2017-06-28

## 2017-06-25 RX ORDER — ACETAMINOPHEN 325 MG/1
650 TABLET ORAL
Status: DISCONTINUED | OUTPATIENT
Start: 2017-06-25 | End: 2017-06-29

## 2017-06-25 RX ORDER — HYDROMORPHONE HYDROCHLORIDE 1 MG/ML
1-2 INJECTION, SOLUTION INTRAMUSCULAR; INTRAVENOUS; SUBCUTANEOUS
Status: DISCONTINUED | OUTPATIENT
Start: 2017-06-25 | End: 2017-06-27

## 2017-06-25 RX ORDER — SODIUM CHLORIDE 0.9 % (FLUSH) 0.9 %
5-10 SYRINGE (ML) INJECTION AS NEEDED
Status: DISCONTINUED | OUTPATIENT
Start: 2017-06-25 | End: 2017-06-25 | Stop reason: HOSPADM

## 2017-06-25 RX ORDER — HYDROMORPHONE HYDROCHLORIDE 1 MG/ML
1 INJECTION, SOLUTION INTRAMUSCULAR; INTRAVENOUS; SUBCUTANEOUS ONCE
Status: COMPLETED | OUTPATIENT
Start: 2017-06-25 | End: 2017-06-25

## 2017-06-25 RX ORDER — VANCOMYCIN 2 GRAM/500 ML IN 0.9 % SODIUM CHLORIDE INTRAVENOUS
2000 ONCE
Status: COMPLETED | OUTPATIENT
Start: 2017-06-25 | End: 2017-06-29

## 2017-06-25 RX ORDER — MORPHINE SULFATE 4 MG/ML
4 INJECTION, SOLUTION INTRAMUSCULAR; INTRAVENOUS
Status: DISCONTINUED | OUTPATIENT
Start: 2017-06-25 | End: 2017-06-25 | Stop reason: SDUPTHER

## 2017-06-25 RX ORDER — SODIUM CHLORIDE 0.9 % (FLUSH) 0.9 %
5-10 SYRINGE (ML) INJECTION AS NEEDED
Status: DISCONTINUED | OUTPATIENT
Start: 2017-06-25 | End: 2017-07-07 | Stop reason: SDUPTHER

## 2017-06-25 RX ORDER — SODIUM CHLORIDE 0.9 % (FLUSH) 0.9 %
5-10 SYRINGE (ML) INJECTION EVERY 8 HOURS
Status: DISCONTINUED | OUTPATIENT
Start: 2017-06-25 | End: 2017-07-07 | Stop reason: SDUPTHER

## 2017-06-25 RX ORDER — NALOXONE HYDROCHLORIDE 0.4 MG/ML
0.4 INJECTION, SOLUTION INTRAMUSCULAR; INTRAVENOUS; SUBCUTANEOUS AS NEEDED
Status: DISCONTINUED | OUTPATIENT
Start: 2017-06-25 | End: 2017-08-24 | Stop reason: HOSPADM

## 2017-06-25 RX ORDER — ENOXAPARIN SODIUM 100 MG/ML
40 INJECTION SUBCUTANEOUS EVERY 24 HOURS
Status: DISCONTINUED | OUTPATIENT
Start: 2017-06-25 | End: 2017-07-19

## 2017-06-25 RX ADMIN — ONDANSETRON 4 MG: 2 INJECTION INTRAMUSCULAR; INTRAVENOUS at 17:11

## 2017-06-25 RX ADMIN — ONDANSETRON 4 MG: 2 INJECTION INTRAMUSCULAR; INTRAVENOUS at 18:04

## 2017-06-25 RX ADMIN — Medication 10 ML: at 23:00

## 2017-06-25 RX ADMIN — ALPRAZOLAM 1 MG: 1 TABLET ORAL at 23:22

## 2017-06-25 RX ADMIN — PROCHLORPERAZINE EDISYLATE 5 MG: 5 INJECTION INTRAMUSCULAR; INTRAVENOUS at 22:51

## 2017-06-25 RX ADMIN — MORPHINE SULFATE 60 MG: 30 TABLET, EXTENDED RELEASE ORAL at 22:38

## 2017-06-25 RX ADMIN — HYDROMORPHONE HYDROCHLORIDE 2 MG: 1 INJECTION, SOLUTION INTRAMUSCULAR; INTRAVENOUS; SUBCUTANEOUS at 18:56

## 2017-06-25 RX ADMIN — VANCOMYCIN HYDROCHLORIDE 2000 MG: 10 INJECTION, POWDER, LYOPHILIZED, FOR SOLUTION INTRAVENOUS at 23:35

## 2017-06-25 RX ADMIN — IOPAMIDOL 100 ML: 755 INJECTION, SOLUTION INTRAVENOUS at 16:17

## 2017-06-25 RX ADMIN — PROCHLORPERAZINE EDISYLATE 10 MG: 5 INJECTION INTRAMUSCULAR; INTRAVENOUS at 18:55

## 2017-06-25 RX ADMIN — ONDANSETRON 4 MG: 2 INJECTION INTRAMUSCULAR; INTRAVENOUS at 20:42

## 2017-06-25 RX ADMIN — HYDROMORPHONE HYDROCHLORIDE 2 MG: 1 INJECTION, SOLUTION INTRAMUSCULAR; INTRAVENOUS; SUBCUTANEOUS at 23:35

## 2017-06-25 RX ADMIN — ENOXAPARIN SODIUM 40 MG: 40 INJECTION SUBCUTANEOUS at 22:38

## 2017-06-25 RX ADMIN — ONDANSETRON 4 MG: 2 INJECTION INTRAMUSCULAR; INTRAVENOUS at 23:36

## 2017-06-25 RX ADMIN — Medication 4 MG: at 18:05

## 2017-06-25 RX ADMIN — SODIUM CHLORIDE 1000 ML: 900 INJECTION, SOLUTION INTRAVENOUS at 17:11

## 2017-06-25 RX ADMIN — HYDROMORPHONE HYDROCHLORIDE 1 MG: 1 INJECTION, SOLUTION INTRAMUSCULAR; INTRAVENOUS; SUBCUTANEOUS at 16:38

## 2017-06-25 RX ADMIN — MORPHINE SULFATE 30 MG: 30 TABLET ORAL at 22:38

## 2017-06-25 RX ADMIN — PROCHLORPERAZINE EDISYLATE 10 MG: 5 INJECTION INTRAMUSCULAR; INTRAVENOUS at 16:37

## 2017-06-25 RX ADMIN — SODIUM CHLORIDE, SODIUM LACTATE, POTASSIUM CHLORIDE, AND CALCIUM CHLORIDE 150 ML/HR: 600; 310; 30; 20 INJECTION, SOLUTION INTRAVENOUS at 22:48

## 2017-06-25 RX ADMIN — HYDROMORPHONE HYDROCHLORIDE 1 MG: 1 INJECTION, SOLUTION INTRAMUSCULAR; INTRAVENOUS; SUBCUTANEOUS at 17:27

## 2017-06-25 RX ADMIN — PIPERACILLIN SODIUM,TAZOBACTAM SODIUM 3.38 G: 3; .375 INJECTION, POWDER, FOR SOLUTION INTRAVENOUS at 22:50

## 2017-06-25 NOTE — IP AVS SNAPSHOT
[address] [facility] 
[phone] Patient: Florin Gonzales MRN: [id number]:[id number] Current Discharge Medication List  
  
START taking these medications Dose & Instructions Dispensing Information Comments Morning Noon Evening Bedtime  
 aspirin 81 mg chewable tablet Your last dose was: Your next dose is:    
   
   
 Dose:  81 mg Take 1 Tab by mouth daily. Quantity:  30 Tab Refills:  0  
     
   
   
   
  
 clopidogrel 75 mg Tab Commonly known as:  PLAVIX Your last dose was: Your next dose is:    
   
   
 Dose:  75 mg Take 1 Tab by mouth daily. Quantity:  30 Tab Refills:  0 HYDROmorphone (PF) 1 mg/mL injection Commonly known as:  DILAUDID Your last dose was: Your next dose is:    
   
   
 Dose:  1 mg  
1 mL by IntraVENous route every one (1) hour as needed (Breakthrough pain). Max Daily Amount: 24 mg. Quantity:  20 mL Refills:  0  
     
   
   
   
  
 ondansetron 4 mg/2 mL Soln Commonly known as:  Mini Dieter Your last dose was: Your next dose is:    
   
   
 Dose:  8 mg  
4 mL by IntraVENous route every six (6) hours as needed. Quantity:  50 mL Refills:  0  
     
   
   
   
  
 pantoprazole 40 mg tablet Commonly known as:  PROTONIX Your last dose was: Your next dose is:    
   
   
 Dose:  40 mg Take 1 Tab by mouth Daily (before breakfast). Quantity:  30 Tab Refills:  0 CONTINUE these medications which have CHANGED Dose & Instructions Dispensing Information Comments Morning Noon Evening Bedtime  
 morphine CR 15 mg CR tablet Commonly known as:  MS CONTIN What changed:   
- medication strength 
- how much to take - when to take this - Another medication with the same name was removed.  Continue taking this medication, and follow the directions you see here. Your last dose was: Your next dose is:    
   
   
 Dose:  45 mg Take 3 Tabs by mouth every eight (8) hours. Max Daily Amount: 135 mg. Quantity:  90 Tab Refills:  0 CONTINUE these medications which have NOT CHANGED Dose & Instructions Dispensing Information Comments Morning Noon Evening Bedtime * albuterol 90 mcg/actuation inhaler Commonly known as:  PROVENTIL HFA, VENTOLIN HFA, PROAIR HFA Your last dose was: Your next dose is:    
   
   
 Dose:  2 Puff Take 2 Puffs by inhalation every four (4) hours as needed for Wheezing. Refills:  0  
     
   
   
   
  
 * albuterol sulfate SR 4 mg ER tablet Commonly known as:  PROVENTIL SR Your last dose was: Your next dose is:    
   
   
 Dose:  4 mg Take 4 mg by mouth daily as needed for Other (Wheezing). Tries to alternate with albuterol MDI when she doesn't need rapid resolution of symptoms Refills:  0  
     
   
   
   
  
 BREO ELLIPTA 200-25 mcg/dose inhaler Generic drug:  fluticasone-vilanterol Your last dose was: Your next dose is:    
   
   
 Dose:  1 Puff Take 1 Puff by inhalation daily. Refills:  0  
     
   
   
   
  
 XANAX PO Your last dose was: Your next dose is:    
   
   
 Dose:  1 mg Take 1 mg by mouth three (3) times daily as needed. Refills:  0  
     
   
   
   
  
 * Notice: This list has 2 medication(s) that are the same as other medications prescribed for you. Read the directions carefully, and ask your doctor or other care provider to review them with you. STOP taking these medications   
 clindamycin 300 mg capsule Commonly known as:  CLEOCIN  
   
  
 CRYSELLE (28) 0.3-30 mg-mcg Tab Generic drug:  norgestrel-ethinyl estradiol  
   
  
 cyanocobalamin (vitamin B-12) 5,000 mcg Tbdi  
   
  
 docusate sodium 100 mg capsule Commonly known as:  COLACE  
   
  
 esomeprazole 20 mg capsule Commonly known as:  NEXIUM  
   
  
 HYDROmorphone 2 mg tablet Commonly known as:  DILAUDID  
   
  
 predniSONE 20 mg tablet Commonly known as:  DELTASONE  
   
  
 PROBIOTIC 20 billion cell Cap Generic drug:  lactobacillus comb no. 10  
   
  
 promethazine 25 mg tablet Commonly known as:  PHENERGAN  
   
  
 RELISTOR 150 mg Tab tablet Generic drug:  methylnaltrexone REMICADE IV  
   
  
 sucralfate 100 mg/mL suspension Commonly known as:  CARAFATE  
   
  
 ZOFRAN ODT 8 mg disintegrating tablet Generic drug:  ondansetron Where to Get Your Medications Information on where to get these meds will be given to you by the nurse or doctor. ! Ask your nurse or doctor about these medications  
  aspirin 81 mg chewable tablet  
 clopidogrel 75 mg Tab HYDROmorphone (PF) 1 mg/mL injection  
 morphine CR 15 mg CR tablet  
 ondansetron 4 mg/2 mL Soln  
 pantoprazole 40 mg tablet

## 2017-06-25 NOTE — ED NOTES
Spoke with АНДРЕЙ Dwyer, informing her that pt is requesting more pain medication. She stated that she is not able to order more at this time due to the high amount that the pt has already received.

## 2017-06-25 NOTE — ED NOTES
Introduced self to pt as primary RN. Questions answered. Plan of care discussed. Pt lying on stretcher in no acute distress, A&Ox4. Resp even and nonlabored. Skin warm and dry. Abd obese with muliptle wounds noted; ABD to upper abd dry and intact. Dressing covering right nipple dry and intact. Pt c/o lower abd pain 9/10. Asking when she can have more pain medication. RN told her that I would check with provider.

## 2017-06-25 NOTE — ED NOTES
Call from [address] [place] [address] Ave. Pt to go to room 502 at [facility]. Number to call report [phone].   EMS arrival 9-930pm

## 2017-06-25 NOTE — ED PROVIDER NOTES
HPI Comments: Pedro Pablo Macedo is a 46 y.o. female  who presents by private vehicle to ER with c/o Patient presents with:  Abdominal Pain  Vomiting  Patient reports lower abdominal pain that started today. Patient discharged from [facility] on Friday after abdominal surgery for crohns flare. Patient reports vomiting with nausea. Denies fever or chills. Patient with ostomy in place. Patient with wound infection that is being treated by home health, currently on clindamycin at home. She specifically denies any fevers, chills, nausea, vomiting, chest pain, shortness of breath, headache, rash, diarrhea, abdominal pain, urinary/bowel changes, sweating or weight loss. PCP: Jamil Anguaino MD   PMHx significant for: Past Medical History:  8/15/2011: Crohn's disease ([facility] [address]  8/15/2011: DVT (deep venous thrombosis) ([facility] [address]  8/15/2011: Incarcerated ventral hernia  8/22/2011: Right flank pain  No date: Seizures ([facility] [address]  No date: Stroke [facility])   PSHx significant for: Past Surgical History:  No date: HX OTHER SURGICAL      Comment: multiple procedures related to her Crohn's                disease  9-10-08: [place] LAP, INCISIONAL HERNIA REPAIR,INCARCERATED      Comment: dr. Eren Madden Hx: Tobacco use: Smoking status: Former Smoker                                                              Packs/day: 0.00      Years: 0.00         Smokeless status: Not on file                     ; EtOH use: The patient states she drinks 0 per week.; Illicit Drug use: Allergies:   -- Demerol (Meperidine) -- Unknown (comments)   -- Soma (Carisoprodol) -- Rash and Nausea Only   -- Sulfa (Sulfonamide Antibiotics) -- Unknown (comments)   -- Toradol (Ketorolac Tromethamine) -- Unknown (comments)    There are no other complaints, changes or physical findings at this time. Patient is a 46 y.o. female presenting with abdominal pain and vomiting. The history is provided by the patient. Abdominal Pain    This is a new problem.  The current episode started 12 to 24 hours ago. The problem occurs constantly. The problem has been gradually worsening. The pain is located in the LLQ. The quality of the pain is aching. The pain is at a severity of 9/10. The pain is severe. Associated symptoms include nausea and vomiting. Pertinent negatives include no anorexia, no diarrhea, no hematochezia, no constipation, no frequency and no headaches. Nothing worsens the pain. The pain is relieved by nothing. Past workup includes CT scan, surgery. Her past medical history is significant for Crohn's disease. Her past medical history does not include PUD, gallstones, ulcerative colitis, irritable bowel syndrome, cancer, UTI, pancreatitis, ectopic pregnancy, ovarian cysts, diverticulitis, atrial fibrillation, DM, kidney stones or small bowel obstruction. The patient's surgical history includes colectomy. The patient's surgical history non-contributory. Vomiting    Associated symptoms include abdominal pain. Pertinent negatives include no diarrhea, no headaches and no headaches. Her pertinent negatives include no irritable bowel syndrome and no DM. Past Medical History:   Diagnosis Date    Crohn's disease ([facility] [address] 8/15/2011    DVT (deep venous thrombosis) ([facility] [address] 8/15/2011    Incarcerated ventral hernia 8/15/2011    Right flank pain 8/22/2011    Seizures ([facility] [address]     Stroke [facility])        Past Surgical History:   Procedure Laterality Date    HX OTHER SURGICAL      multiple procedures related to her Crohn's disease    [place] LAP, INCISIONAL HERNIA REPAIR,INCARCERATED  9-10-08    dr. Joceline Flores         Family History:   Problem Relation Age of Onset    Hypertension Father     Stroke Father     Other Father      arthritis       Social History     Social History    Marital status: SINGLE     Spouse name: N/A    Number of children: N/A    Years of education: N/A     Occupational History    Not on file.      Social History Main Topics    Smoking status: Former Smoker    Smokeless tobacco: Not on file    Alcohol use No    Drug use: Not on file    Sexual activity: Not on file     Other Topics Concern    Not on file     Social History Narrative         ALLERGIES: Demerol [meperidine]; Soma [carisoprodol]; Sulfa (sulfonamide antibiotics); and Toradol [ketorolac tromethamine]    Review of Systems   Constitutional: Negative. HENT: Negative. Eyes: Negative. Respiratory: Negative. Cardiovascular: Negative. Gastrointestinal: Positive for abdominal pain, nausea and vomiting. Negative for anorexia, constipation, diarrhea and hematochezia. Endocrine: Negative. Genitourinary: Negative. Negative for frequency. Musculoskeletal: Negative. Skin: Negative. Allergic/Immunologic: Negative. Neurological: Negative. Negative for headaches. Hematological: Negative. Psychiatric/Behavioral: Negative. All other systems reviewed and are negative. Vitals:    06/25/17 1548   BP: 96/76   Pulse: 95   Resp: 15   Temp: 98.8 °F (37.1 °C)   SpO2: 97%   Weight: 155.6 kg (343 lb)            Physical Exam   Constitutional: She is oriented to person, place, and time. She appears well-developed and well-nourished. HENT:   Head: Normocephalic and atraumatic. Right Ear: External ear normal.   Left Ear: External ear normal.   Mouth/Throat: Oropharynx is clear and moist. No oropharyngeal exudate. Eyes: Conjunctivae and EOM are normal. Pupils are equal, round, and reactive to light. Right eye exhibits no discharge. Left eye exhibits no discharge. No scleral icterus. Neck: Normal range of motion. No tracheal deviation present. No thyromegaly present. Cardiovascular: Normal rate, regular rhythm and normal heart sounds. No murmur heard. Pulmonary/Chest: Effort normal and breath sounds normal. No respiratory distress. She has no wheezes. She has no rales. She exhibits no tenderness. Abdominal: Soft. Bowel sounds are normal. She exhibits no distension.  There is tenderness in the left lower quadrant. There is no rebound and no guarding. Musculoskeletal: Normal range of motion. She exhibits no edema or tenderness. Lymphadenopathy:     She has no cervical adenopathy. Neurological: She is alert and oriented to person, place, and time. No cranial nerve deficit. Coordination normal.   Skin: Skin is warm. No erythema. Psychiatric: She has a normal mood and affect. Her behavior is normal. Judgment and thought content normal.   Nursing note and vitals reviewed. MDM  Number of Diagnoses or Management Options  Abdominal pain, LLQ (left lower quadrant):   Bandemia:   Diagnosis management comments: Assesment/Plan- 45 year old female with abdominal pain. Differential includes post-op infection, obstruction, colitis, perforation. Labs and imaging reviewed, patient with leukocytosis and bandemia. Ct scan showing mesenteric edema. Discussed with Dr. Mio Gross at [facility]. Will accept for transfer. Amount and/or Complexity of Data Reviewed  Clinical lab tests: ordered and reviewed  Tests in the radiology section of CPT®: ordered and reviewed  Tests in the medicine section of CPT®: ordered and reviewed  Discuss the patient with other providers: yes (Attending- Dr. Thea Dubin)      ED Course       Procedures         CONSULT NOTE:   6:47 PM  Abhinav Bell PA-C spoke with Dr. Mio Gross,   Specialty: Surgery  Discussed pt's hx, disposition, and available diagnostic and imaging results. Reviewed care plans. Consultant agrees with plans as outlined. Will accept patient for transfer to [facility].

## 2017-06-25 NOTE — IP AVS SNAPSHOT
[address] [facility] [address] [place] [address] 
[phone] Patient: Linda Otoole MRN: [id number] You are allergic to the following Allergen Reactions Demerol (Meperidine) Unknown (comments) Soma (Carisoprodol) Rash Nausea Only Sulfa (Sulfonamide Antibiotics) Unknown (comments) Toradol (Ketorolac Tromethamine) Unknown (comments) Recent Documentation Height Weight Breastfeeding? BMI Smoking Status 1.626 m 153.8 kg No 58.2 kg/m2 Former Smoker Emergency Contacts Name Discharge Info Relation Home Work Mobile R Caitlin Becerra 2  Parent [1]  [phone] About your hospitalization You were admitted on:  June 25, 2017 You last received care in the:  [address] You were discharged on:  August 22, 2017 Unit phone number:  [phone] Why you were hospitalized Your primary diagnosis was:  Not on File Your diagnoses also included:  Abdominal Pain, Small Bowel Ischemia (Hcc), Superior Mesenteric Artery Thrombosis (Hcc), Enterocutaneous Fistula Providers Seen During Your Hospitalizations Provider Role Specialty Primary office phone Marcella Mcadams MD Attending Provider General Surgery [phone] Your Primary Care Physician (PCP) Primary Care Physician Office Phone Office Fax 53 [address] Feng 08 Follow-up Information Follow up With Details Comments Contact Info Marcella Mcadams MD In 10 days For wound re-check [address] [facility] [address] [place] [address] 
[phone] [address] [place] [address] 
[phone] Rama Vang MD   [address] [facility] [address] [phone] Current Discharge Medication List  
  
START taking these medications Dose & Instructions Dispensing Information Comments Morning Noon Evening Bedtime  
 aspirin 81 mg chewable tablet Your last dose was: Your next dose is:    
   
   
 Dose:  81 mg Take 1 Tab by mouth daily. Quantity:  30 Tab Refills:  0  
     
   
   
   
  
 clopidogrel 75 mg Tab Commonly known as:  PLAVIX Your last dose was: Your next dose is:    
   
   
 Dose:  75 mg Take 1 Tab by mouth daily. Quantity:  30 Tab Refills:  0 HYDROmorphone (PF) 1 mg/mL injection Commonly known as:  DILAUDID Your last dose was: Your next dose is:    
   
   
 Dose:  1 mg  
1 mL by IntraVENous route every one (1) hour as needed (Breakthrough pain). Max Daily Amount: 24 mg. Quantity:  20 mL Refills:  0  
     
   
   
   
  
 ondansetron 4 mg/2 mL Soln Commonly known as:  Fernandez [place] Your last dose was: Your next dose is:    
   
   
 Dose:  8 mg  
4 mL by IntraVENous route every six (6) hours as needed. Quantity:  50 mL Refills:  0  
     
   
   
   
  
 pantoprazole 40 mg tablet Commonly known as:  PROTONIX Your last dose was: Your next dose is:    
   
   
 Dose:  40 mg Take 1 Tab by mouth Daily (before breakfast). Quantity:  30 Tab Refills:  0 CONTINUE these medications which have CHANGED Dose & Instructions Dispensing Information Comments Morning Noon Evening Bedtime  
 morphine CR 15 mg CR tablet Commonly known as:  MS CONTIN What changed:   
- medication strength 
- how much to take - when to take this - Another medication with the same name was removed. Continue taking this medication, and follow the directions you see here. Your last dose was: Your next dose is:    
   
   
 Dose:  45 mg Take 3 Tabs by mouth every eight (8) hours. Max Daily Amount: 135 mg. Quantity:  90 Tab Refills:  0 CONTINUE these medications which have NOT CHANGED Dose & Instructions Dispensing Information Comments Morning Noon Evening Bedtime * albuterol 90 mcg/actuation inhaler Commonly known as:  PROVENTIL HFA, VENTOLIN HFA, PROAIR HFA Your last dose was: Your next dose is:    
   
   
 Dose:  2 Puff Take 2 Puffs by inhalation every four (4) hours as needed for Wheezing. Refills:  0  
     
   
   
   
  
 * albuterol sulfate SR 4 mg ER tablet Commonly known as:  PROVENTIL SR Your last dose was: Your next dose is:    
   
   
 Dose:  4 mg Take 4 mg by mouth daily as needed for Other (Wheezing). Tries to alternate with albuterol MDI when she doesn't need rapid resolution of symptoms Refills:  0  
     
   
   
   
  
 BREO ELLIPTA 200-25 mcg/dose inhaler Generic drug:  fluticasone-vilanterol Your last dose was: Your next dose is:    
   
   
 Dose:  1 Puff Take 1 Puff by inhalation daily. Refills:  0  
     
   
   
   
  
 XANAX PO Your last dose was: Your next dose is:    
   
   
 Dose:  1 mg Take 1 mg by mouth three (3) times daily as needed. Refills:  0  
     
   
   
   
  
 * Notice: This list has 2 medication(s) that are the same as other medications prescribed for you. Read the directions carefully, and ask your doctor or other care provider to review them with you. STOP taking these medications   
 clindamycin 300 mg capsule Commonly known as:  CLEOCIN  
   
  
 CRYSELLE (28) 0.3-30 mg-mcg Tab Generic drug:  norgestrel-ethinyl estradiol  
   
  
 cyanocobalamin (vitamin B-12) 5,000 mcg Tbdi  
   
  
 docusate sodium 100 mg capsule Commonly known as:  COLACE  
   
  
 esomeprazole 20 mg capsule Commonly known as:  NEXIUM  
   
  
 HYDROmorphone 2 mg tablet Commonly known as:  DILAUDID  
   
  
 predniSONE 20 mg tablet Commonly known as:  DELTASONE  
   
  
 PROBIOTIC 20 billion cell Cap Generic drug:  lactobacillus comb no. 10  
   
  
 promethazine 25 mg tablet Commonly known as:  PHENERGAN  
   
  
 RELISTOR 150 mg Tab tablet Generic drug:  methylnaltrexone REMICADE IV  
   
  
 sucralfate 100 mg/mL suspension Commonly known as:  CARAFATE  
   
  
 ZOFRAN ODT 8 mg disintegrating tablet Generic drug:  ondansetron Where to Get Your Medications Information on where to get these meds will be given to you by the nurse or doctor. ! Ask your nurse or doctor about these medications  
  aspirin 81 mg chewable tablet  
 clopidogrel 75 mg Tab HYDROmorphone (PF) 1 mg/mL injection  
 morphine CR 15 mg CR tablet  
 ondansetron 4 mg/2 mL Soln  
 pantoprazole 40 mg tablet Discharge Instructions Aspirin (By mouth) Aspirin (AS-pir-in) Treats pain, fever, and inflammation. May lower risk of heart attack and stroke. Brand Name(s): Ascriptin Regular Strength, Aspergum, Aspir Low, Aspirin Adult Low Dose, Vani Aspirin Children's, Vani Aspirin Regimen, Vani Extra Strength, Vani Genuine Aspirin, Bufferin, Bufferin Low Dose, Durlaza, Ecotrin, Ecpirin, Enteric Aspirin, Genuine Aspirin There may be other brand names for this medicine. When This Medicine Should Not Be Used: This medicine is not right for everyone. Do not use it if you had an allergic reaction to aspirin or other NSAIDs, or if you have a history of asthma with nasal polyps and rhinitis. How to Use This Medicine:  
Delayed Release Capsule, Long Acting Capsule, Gum, Tablet, Chewable Tablet, Fizzy Tablet, Coated Tablet, Long Acting Tablet, 24 Hour Capsule · Your doctor will tell you how much medicine to use. Do not use more than directed. · It is best to take this medicine with food or milk. · Capsule, tablet, or coated tablet: Swallow whole. Do not crush, break, or chew it. · Chewable tablet: You may chew it completely or swallow it whole. · Gum: Chew completely to make sure you get as much medicine as possible. Drink a full glass (8 ounces) of water after chewing the gum. · Swallow the extended-release capsule whole. Do not crush, break, or chew it. Take the capsule with a full glass of water at the same time each day. · Follow the instructions on the medicine label if you are using this medicine without a prescription. · Missed dose: If you miss a dose of Durlaza, skip the missed dose and go back to your regular dosing schedule. Do not take extra medicine to make up for a missed dose. · Store the medicine in a closed container at room temperature, away from heat, moisture, and direct light. Drugs and Foods to Avoid: Ask your doctor or pharmacist before using any other medicine, including over-the-counter medicines, vitamins, and herbal products. · Some foods and medicines can affect how aspirin works. Tell your doctor if you are using any of the following: ¨ Dipyridamole, methotrexate, probenecid, sulfinpyrazone, ticlopidine ¨ Blood thinner (including clopidogrel, prasugrel, ticagrelor, warfarin) ¨ Blood pressure medicine ¨ Medicine to treat seizures (including phenytoin, valproic acid) ¨ NSAID pain or arthritis medicine (including celecoxib, diclofenac, ibuprofen, naproxen) ¨ Steroid medicine (including dexamethasone, hydrocortisone, methylprednisolone, prednisolone, prednisone) · Do not take Durlaza 2 hours before or 1 hour after you drink alcohol or take medicines that contain alcohol. Warnings While Using This Medicine: · Tell your doctor if you are pregnant or breastfeeding. Do not use this medicine during the later part of a pregnancy unless your doctor tells you to. · Tell your doctor if you have kidney disease, liver disease, high blood pressure, heart disease, or a history of stomach bleeding or ulcers. · This medicine may increase your risk for bleeding, including stomach ulcers. · Do not give aspirin to a child or teenager who has chickenpox or flu symptoms, unless the doctor says it is okay. Aspirin can cause a life-threatening reaction called Reye syndrome. · Tell any doctor or dentist who treats you that you are using this medicine. This medicine may affect certain medical test results. · Keep all medicine out of the reach of children. Never share your medicine with anyone. Possible Side Effects While Using This Medicine:  
Call your doctor right away if you notice any of these side effects: · Allergic reaction: Itching or hives, swelling in your face or hands, swelling or tingling in your mouth or throat, chest tightness, trouble breathing · Bloody or black stools, bloody vomit or vomit that looks like coffee grounds · Chest tightness, wheezing · Ringing in the ears · Severe stomach pain · Unusual bleeding, bruising, or weakness If you notice other side effects that you think are caused by this medicine, tell your doctor. Call your doctor for medical advice about side effects. You may report side effects to FDA at 0-119-FDA-0674 © 2017 [address] Information is for End User's use only and may not be sold, redistributed or otherwise used for commercial purposes. The above information is an  only. It is not intended as medical advice for individual conditions or treatments. Talk to your doctor, nurse or pharmacist before following any medical regimen to see if it is safe and effective for you. Clopidogrel (By mouth) Clopidogrel ([id number]-SBV-nb-grel) Helps prevent stroke, heart attack, and other heart problems. This medicine is a platelet inhibitor. Brand Name(s): Plavix There may be other brand names for this medicine. When This Medicine Should Not Be Used: This medicine is not right for everyone. Do not use it if you had an allergic reaction to clopidogrel. How to Use This Medicine:  
Tablet · Your doctor will tell you how much medicine to use. Do not use more than directed. · This medicine should come with a Medication Guide. Ask your pharmacist for a copy if you do not have one. · Missed dose: Take a dose as soon as you remember. If it is almost time for your next dose, wait until then and take a regular dose. Do not take extra medicine to make up for a missed dose. · Store the medicine in a closed container at room temperature, away from heat, moisture, and direct light. Drugs and Foods to Avoid: Ask your doctor or pharmacist before using any other medicine, including over-the-counter medicines, vitamins, and herbal products. · Some medicines can affect how clopidogrel works. Tell your doctor if you are using any of the following: ¨ Esomeprazole, omeprazole, repaglinide, or warfarin ¨ Medicine to treat depression ¨ NSAID pain or arthritis medicine (including celecoxib, diclofenac, ibuprofen, or naproxen) Warnings While Using This Medicine: · Tell your doctor if you are pregnant or breastfeeding, or if you have bleeding problems, stomach ulcer or bleeding, a recent stroke, or have a history of bleeding problems. · This medicine can cause you to bleed and bruise more easily. Take precautions to avoid injury. Brush and floss your teeth gently, do not play rough sports, and be careful with sharp objects. Severe bleeding can be life-threatening. · This medicine may cause a rare but serious blood clotting condition called thrombotic thrombocytopenic purpura. · Make sure any doctor or dentist who treats you knows that you are using this medicine. Tell your doctor if you plan to have surgery or a dental procedure. · Do not stop using this medicine suddenly. Your doctor will need to slowly decrease your dose before you stop it completely. · Your doctor will check your progress and the effects of this medicine at regular visits. Keep all appointments. · Keep all medicine out of the reach of children. Never share your medicine with anyone. Possible Side Effects While Using This Medicine:  
Call your doctor right away if you notice any of these side effects: · Allergic reaction: Itching or hives, swelling in your face or hands, swelling or tingling in your mouth or throat, chest tightness, trouble breathing · Bloody or black, tarry stools · Nosebleeds · Pinpoint red or purple spots on your skin or in your mouth · Problems with vision, speech, or walking · Red or dark brown urine · Seizures · Severe stomach pain · Trouble breathing, tiredness, fast heartbeat, yellow skin or eyes · Unusual bleeding, bruising, or weakness · Vomiting of blood or vomit that looks like coffee grounds If you notice other side effects that you think are caused by this medicine, tell your doctor. Call your doctor for medical advice about side effects. You may report side effects to FDA at 1-592-FDA-2572 © 2017 [facility] Information is for End User's use only and may not be sold, redistributed or otherwise used for commercial purposes. The above information is an  only. It is not intended as medical advice for individual conditions or treatments. Talk to your doctor, nurse or pharmacist before following any medical regimen to see if it is safe and effective for you. Abdominal Pain: Care Instructions Your Care Instructions Abdominal pain has many possible causes. Some aren't serious and get better on their own in a few days. Others need more testing and treatment. If your pain continues or gets worse, you need to be rechecked and may need more tests to find out what is wrong. You may need surgery to correct the problem. Don't ignore new symptoms, such as fever, nausea and vomiting, urination problems, pain that gets worse, and dizziness. These may be signs of a more serious problem. Your doctor may have recommended a follow-up visit in the next 8 to 12 hours. If you are not getting better, you may need more tests or treatment. The doctor has checked you carefully, but problems can develop later. If you notice any problems or new symptoms, get medical treatment right away. Follow-up care is a key part of your treatment and safety. Be sure to make and go to all appointments, and call your doctor if you are having problems. It's also a good idea to know your test results and keep a list of the medicines you take. How can you care for yourself at home? · Rest until you feel better. · To prevent dehydration, drink plenty of fluids, enough so that your urine is light yellow or clear like water. Choose water and other caffeine-free clear liquids until you feel better. If you have kidney, heart, or liver disease and have to limit fluids, talk with your doctor before you increase the amount of fluids you drink. · If your stomach is upset, eat mild foods, such as rice, dry toast or crackers, bananas, and applesauce. Try eating several small meals instead of two or three large ones. · Wait until 48 hours after all symptoms have gone away before you have spicy foods, alcohol, and drinks that contain caffeine. · Do not eat foods that are high in fat. · Avoid anti-inflammatory medicines such as aspirin, ibuprofen (Advil, Motrin), and naproxen (Aleve). These can cause stomach upset. Talk to your doctor if you take daily aspirin for another health problem. When should you call for help? Call 911 anytime you think you may need emergency care. For example, call if: 
· You passed out (lost consciousness). · You pass maroon or very bloody stools. · You vomit blood or what looks like coffee grounds. · You have new, severe belly pain. Call your doctor now or seek immediate medical care if: 
· Your pain gets worse, especially if it becomes focused in one area of your belly. · You have a new or higher fever. · Your stools are black and look like tar, or they have streaks of blood. · You have unexpected vaginal bleeding. · You have symptoms of a urinary tract infection. These may include: 
¨ Pain when you urinate. ¨ Urinating more often than usual. 
¨ Blood in your urine. · You are dizzy or lightheaded, or you feel like you may faint. Watch closely for changes in your health, and be sure to contact your doctor if: 
· You are not getting better after 1 day (24 hours). Where can you learn more? Go to http://cornell-terrell.info/. Enter T526 in the search box to learn more about \"Abdominal Pain: Care Instructions. \" Current as of: March 20, 2017 Content Version: 11.3 © 3126-7849 [employer]. Care instructions adapted under license by [employer] (which disclaims liability or warranty for this information). If you have questions about a medical condition or this instruction, always ask your healthcare professional. [place] [address] any warranty or liability for your use of this information. Physician Discharge Instructions Patient ID: Nitin Astorga [id number] 
46 y.o. 
1975 Admit Date: 6/25/2017 Discharge Date: 8/21/2017 Admission Diagnoses: N/V intractable post-opt pain Abdominal pain 
poor iv access DEAD BOWEL SMALL BOWEL OBSTRUCTION 
central line placement Abdominal pain Discharge Diagnoses: Active Problems: 
  Abdominal pain (6/25/2017) Small bowel ischemia ([address] (6/28/2017) Overview: CT abd/pelvis 6/28/17 1. The stomach and proximal small bowel loops are distended.  There is a  
    loop of 
    small bowel in the midabdomen which is mildly dilated and does not  
    demonstrate 
    enhancement in the wall and there is suspicion of pneumatosis and this  
    leads to 
    a loop of small bowel which demonstrates thickening of the wall and  
    findings are 
 suspicious for ischemia. 2. There is extensive mesenteric edema and a small amount of ascites in  
    the 
    pelvis. Superior mesenteric artery thrombosis ([address] (6/28/2017) Overview: CT abd/pelvis 6/28/17: 
    3. There is nonocclusive thrombus in the SMA. 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  
Wound care instructions per wound care nursing. TPN for diet. May have sips of clear liquids Red rubber catheter - maintain to gravity drainage Morphine PCA:  Loading dose - 0mg; Pt bolus dose - 5mg; Lockout interval - 10min.; Basal rate - 1mg/hr; 4 hour dose limit - 120mg Signed: Kristofer Chicas NP 
8/21/2017 
1:40 PM 
 
Discharge Orders None [employer] To Get Approved For Announcement We are excited to announce that we are making your provider's discharge notes available to you in [employer] To Get Approved For. You will see these notes when they are completed and signed by the physician that discharged you from your recent hospital stay. If you have any questions or concerns about any information you see in [employer] Get Approved For, please call the Health Information Department where you were seen or reach out to your Primary Care Provider for more information about your plan of care. Introducing [facility]! Yadi Trujillo introduces [employer] To Get Approved For patient portal. Now you can access parts of your medical record, email your doctor's office, and request medication refills online. 1. In your internet browser, go to [email or web address]. Locate Special [email or web address] 2. Click on the First Time User? Click Here link in the Sign In box. You will see the New Member Sign Up page. 3. Enter your [employer] To Get Approved For Access Code exactly as it appears below. You will not need to use this code after youve completed the sign-up process. If you do not sign up before the expiration date, you must request a new code. · [employer] Access Code: [email or web address] Expires: 9/19/2017  8:55 AM 
 
4. Enter the last four digits of your Social Security Number (xxxx) and Date of Birth (mm/dd/yyyy) as indicated and click Submit. You will be taken to the next sign-up page. 5. Create a [employer] ID. This will be your [employer] login ID and cannot be changed, so think of one that is secure and easy to remember. 6. Create a [employer] password. You can change your password at any time. 7. Enter your Password Reset Question and Answer. This can be used at a later time if you forget your password. 8. Enter your e-mail address. You will receive e-mail notification when new information is available in [address] 9. Click Sign Up. You can now view and download portions of your medical record. 10. Click the Download Summary menu link to download a portable copy of your medical information. If you have questions, please visit the Frequently Asked Questions section of the [employer] website. Remember, [employer] is NOT to be used for urgent needs. For medical emergencies, dial 911. Now available from your iPhone and Android! General Information Please provide this summary of care documentation to your next provider. Patient Signature:  ____________________________________________________________ Date:  ____________________________________________________________  
  
Lisa Varela Provider Signature:  ____________________________________________________________ Date:  ____________________________________________________________

## 2017-06-25 NOTE — ED NOTES
RN gave pt her IV pain medication. After administration, pt says, \"That helped. I feel like I need one more then I could get comfortable. When am I getting my next pain medicine? \"

## 2017-06-25 NOTE — ED NOTES
Pt got up to [facility] to urinate. Safely back in bed now. Mother at bedside.

## 2017-06-25 NOTE — ED TRIAGE NOTES
45 y/o female presents to ED complaining of 10/l0 severity lower abdominal pain. Pt had abdominal surgery 2 days ago at [facility]. [address] and they came here.   Pt actively vomiting upon arrival.

## 2017-06-26 ENCOUNTER — HOME CARE VISIT (OUTPATIENT)
Dept: HOME HEALTH SERVICES | Facility: HOME HEALTH | Age: 42
End: 2017-06-26
Payer: MEDICARE

## 2017-06-26 LAB
ANION GAP BLD CALC-SCNC: 12 MMOL/L (ref 5–15)
APPEARANCE UR: CLEAR
BACTERIA URNS QL MICRO: NEGATIVE /HPF
BASOPHILS # BLD AUTO: 0 K/UL (ref 0–0.1)
BASOPHILS # BLD: 0 % (ref 0–1)
BILIRUB UR QL: NEGATIVE
BUN SERPL-MCNC: 9 MG/DL (ref 6–20)
BUN/CREAT SERPL: 10 (ref 12–20)
CALCIUM SERPL-MCNC: 8 MG/DL (ref 8.5–10.1)
CHLORIDE SERPL-SCNC: 100 MMOL/L (ref 97–108)
CO2 SERPL-SCNC: 22 MMOL/L (ref 21–32)
COLOR UR: NORMAL
CREAT SERPL-MCNC: 0.87 MG/DL (ref 0.55–1.02)
DIFFERENTIAL METHOD BLD: ABNORMAL
EOSINOPHIL # BLD: 0 K/UL (ref 0–0.4)
EOSINOPHIL NFR BLD: 0 % (ref 0–7)
EPITH CASTS URNS QL MICRO: NORMAL /LPF
ERYTHROCYTE [DISTWIDTH] IN BLOOD BY AUTOMATED COUNT: 14.9 % (ref 11.5–14.5)
GLUCOSE SERPL-MCNC: 125 MG/DL (ref 65–100)
GLUCOSE UR STRIP.AUTO-MCNC: NEGATIVE MG/DL
HCT VFR BLD AUTO: 37.1 % (ref 35–47)
HGB BLD-MCNC: 12.2 G/DL (ref 11.5–16)
HGB UR QL STRIP: NEGATIVE
HYALINE CASTS URNS QL MICRO: NORMAL /LPF (ref 0–5)
KETONES UR QL STRIP.AUTO: NEGATIVE MG/DL
LEUKOCYTE ESTERASE UR QL STRIP.AUTO: NEGATIVE
LYMPHOCYTES # BLD AUTO: 13 % (ref 12–49)
LYMPHOCYTES # BLD: 3.6 K/UL (ref 0.8–3.5)
MAGNESIUM SERPL-MCNC: 1.3 MG/DL (ref 1.6–2.4)
MCH RBC QN AUTO: 29.9 PG (ref 26–34)
MCHC RBC AUTO-ENTMCNC: 32.9 G/DL (ref 30–36.5)
MCV RBC AUTO: 90.9 FL (ref 80–99)
METAMYELOCYTES NFR BLD MANUAL: 1 %
MONOCYTES # BLD: 1.4 K/UL (ref 0–1)
MONOCYTES NFR BLD AUTO: 5 % (ref 5–13)
NEUTS BAND NFR BLD MANUAL: 8 % (ref 0–6)
NEUTS SEG # BLD: 22.4 K/UL (ref 1.8–8)
NEUTS SEG NFR BLD AUTO: 73 % (ref 32–75)
NITRITE UR QL STRIP.AUTO: NEGATIVE
PH UR STRIP: 7 [PH] (ref 5–8)
PHOSPHATE SERPL-MCNC: 3.7 MG/DL (ref 2.6–4.7)
PLATELET # BLD AUTO: 264 K/UL (ref 150–400)
POTASSIUM SERPL-SCNC: 3.2 MMOL/L (ref 3.5–5.1)
PROT UR STRIP-MCNC: NEGATIVE MG/DL
RBC # BLD AUTO: 4.08 M/UL (ref 3.8–5.2)
RBC #/AREA URNS HPF: NORMAL /HPF (ref 0–5)
RBC MORPH BLD: ABNORMAL
RBC MORPH BLD: ABNORMAL
SODIUM SERPL-SCNC: 134 MMOL/L (ref 136–145)
SP GR UR REFRACTOMETRY: 1.01 (ref 1–1.03)
UA: UC IF INDICATED,UAUC: NORMAL
UROBILINOGEN UR QL STRIP.AUTO: 0.2 EU/DL (ref 0.2–1)
WBC # BLD AUTO: 27.6 K/UL (ref 3.6–11)
WBC URNS QL MICRO: NORMAL /HPF (ref 0–4)

## 2017-06-26 PROCEDURE — 65270000029 HC RM PRIVATE

## 2017-06-26 PROCEDURE — 84100 ASSAY OF PHOSPHORUS: CPT | Performed by: SURGERY

## 2017-06-26 PROCEDURE — 74011250636 HC RX REV CODE- 250/636: Performed by: NURSE PRACTITIONER

## 2017-06-26 PROCEDURE — 74011250636 HC RX REV CODE- 250/636: Performed by: SURGERY

## 2017-06-26 PROCEDURE — 74011250637 HC RX REV CODE- 250/637: Performed by: SURGERY

## 2017-06-26 PROCEDURE — 74011000258 HC RX REV CODE- 258: Performed by: SURGERY

## 2017-06-26 PROCEDURE — 76450000000

## 2017-06-26 PROCEDURE — 3331090001 HH PPS REVENUE CREDIT

## 2017-06-26 PROCEDURE — 74011000250 HC RX REV CODE- 250: Performed by: SURGERY

## 2017-06-26 PROCEDURE — 77030008027

## 2017-06-26 PROCEDURE — 36415 COLL VENOUS BLD VENIPUNCTURE: CPT | Performed by: SURGERY

## 2017-06-26 PROCEDURE — 3331090002 HH PPS REVENUE DEBIT

## 2017-06-26 PROCEDURE — 85025 COMPLETE CBC W/AUTO DIFF WBC: CPT | Performed by: SURGERY

## 2017-06-26 PROCEDURE — 80048 BASIC METABOLIC PNL TOTAL CA: CPT | Performed by: SURGERY

## 2017-06-26 PROCEDURE — 81001 URINALYSIS AUTO W/SCOPE: CPT | Performed by: NURSE PRACTITIONER

## 2017-06-26 PROCEDURE — C9113 INJ PANTOPRAZOLE SODIUM, VIA: HCPCS | Performed by: SURGERY

## 2017-06-26 PROCEDURE — 83735 ASSAY OF MAGNESIUM: CPT | Performed by: SURGERY

## 2017-06-26 RX ORDER — SODIUM CHLORIDE, SODIUM LACTATE, POTASSIUM CHLORIDE, CALCIUM CHLORIDE 600; 310; 30; 20 MG/100ML; MG/100ML; MG/100ML; MG/100ML
100 INJECTION, SOLUTION INTRAVENOUS CONTINUOUS
Status: DISCONTINUED | OUTPATIENT
Start: 2017-06-26 | End: 2017-06-29

## 2017-06-26 RX ORDER — METOCLOPRAMIDE HYDROCHLORIDE 5 MG/5ML
5 SOLUTION ORAL
Status: DISCONTINUED | OUTPATIENT
Start: 2017-06-26 | End: 2017-06-28

## 2017-06-26 RX ORDER — ONDANSETRON 4 MG/1
8 TABLET, ORALLY DISINTEGRATING ORAL
Status: DISCONTINUED | OUTPATIENT
Start: 2017-06-26 | End: 2017-06-28

## 2017-06-26 RX ORDER — PANTOPRAZOLE SODIUM 40 MG/1
40 TABLET, DELAYED RELEASE ORAL
Status: DISCONTINUED | OUTPATIENT
Start: 2017-06-27 | End: 2017-06-27

## 2017-06-26 RX ORDER — POTASSIUM CHLORIDE 7.45 MG/ML
10 INJECTION INTRAVENOUS
Status: DISCONTINUED | OUTPATIENT
Start: 2017-06-26 | End: 2017-06-26 | Stop reason: SDUPTHER

## 2017-06-26 RX ORDER — POTASSIUM CHLORIDE 7.45 MG/ML
10 INJECTION INTRAVENOUS
Status: COMPLETED | OUTPATIENT
Start: 2017-06-26 | End: 2017-07-01

## 2017-06-26 RX ORDER — MAGNESIUM SULFATE HEPTAHYDRATE 40 MG/ML
2 INJECTION, SOLUTION INTRAVENOUS ONCE
Status: COMPLETED | OUTPATIENT
Start: 2017-06-26 | End: 2017-06-29

## 2017-06-26 RX ADMIN — PIPERACILLIN SODIUM,TAZOBACTAM SODIUM 3.38 G: 3; .375 INJECTION, POWDER, FOR SOLUTION INTRAVENOUS at 05:41

## 2017-06-26 RX ADMIN — Medication 10 ML: at 17:17

## 2017-06-26 RX ADMIN — POTASSIUM CHLORIDE 10 MEQ: 10 INJECTION, SOLUTION INTRAVENOUS at 21:00

## 2017-06-26 RX ADMIN — ONDANSETRON 4 MG: 2 INJECTION INTRAMUSCULAR; INTRAVENOUS at 12:46

## 2017-06-26 RX ADMIN — Medication 10 ML: at 19:31

## 2017-06-26 RX ADMIN — PROCHLORPERAZINE EDISYLATE 5 MG: 5 INJECTION INTRAMUSCULAR; INTRAVENOUS at 04:46

## 2017-06-26 RX ADMIN — HYDROMORPHONE HYDROCHLORIDE 2 MG: 1 INJECTION, SOLUTION INTRAMUSCULAR; INTRAVENOUS; SUBCUTANEOUS at 05:41

## 2017-06-26 RX ADMIN — PROCHLORPERAZINE EDISYLATE 5 MG: 5 INJECTION INTRAMUSCULAR; INTRAVENOUS at 19:30

## 2017-06-26 RX ADMIN — ENOXAPARIN SODIUM 40 MG: 40 INJECTION SUBCUTANEOUS at 22:35

## 2017-06-26 RX ADMIN — HYDROMORPHONE HYDROCHLORIDE 2 MG: 1 INJECTION, SOLUTION INTRAMUSCULAR; INTRAVENOUS; SUBCUTANEOUS at 09:22

## 2017-06-26 RX ADMIN — Medication 10 ML: at 10:00

## 2017-06-26 RX ADMIN — ONDANSETRON 8 MG: 4 TABLET, ORALLY DISINTEGRATING ORAL at 21:32

## 2017-06-26 RX ADMIN — ALPRAZOLAM 1 MG: 1 TABLET ORAL at 15:58

## 2017-06-26 RX ADMIN — MORPHINE SULFATE 60 MG: 30 TABLET, EXTENDED RELEASE ORAL at 07:46

## 2017-06-26 RX ADMIN — METOCLOPRAMIDE HYDROCHLORIDE 5 MG: 5 SOLUTION ORAL at 22:33

## 2017-06-26 RX ADMIN — Medication 5 ML: at 06:00

## 2017-06-26 RX ADMIN — HYDROMORPHONE HYDROCHLORIDE 2 MG: 1 INJECTION, SOLUTION INTRAMUSCULAR; INTRAVENOUS; SUBCUTANEOUS at 17:15

## 2017-06-26 RX ADMIN — HYDROMORPHONE HYDROCHLORIDE 2 MG: 1 INJECTION, SOLUTION INTRAMUSCULAR; INTRAVENOUS; SUBCUTANEOUS at 12:46

## 2017-06-26 RX ADMIN — POTASSIUM CHLORIDE 10 MEQ: 10 INJECTION, SOLUTION INTRAVENOUS at 16:06

## 2017-06-26 RX ADMIN — MAGNESIUM SULFATE HEPTAHYDRATE 2 G: 40 INJECTION, SOLUTION INTRAVENOUS at 14:13

## 2017-06-26 RX ADMIN — Medication 10 ML: at 22:35

## 2017-06-26 RX ADMIN — PIPERACILLIN SODIUM,TAZOBACTAM SODIUM 3.38 G: 3; .375 INJECTION, POWDER, FOR SOLUTION INTRAVENOUS at 14:09

## 2017-06-26 RX ADMIN — MORPHINE SULFATE 60 MG: 30 TABLET, EXTENDED RELEASE ORAL at 21:25

## 2017-06-26 RX ADMIN — HYDROMORPHONE HYDROCHLORIDE 2 MG: 1 INJECTION, SOLUTION INTRAMUSCULAR; INTRAVENOUS; SUBCUTANEOUS at 22:34

## 2017-06-26 RX ADMIN — MORPHINE SULFATE 60 MG: 30 TABLET, EXTENDED RELEASE ORAL at 14:05

## 2017-06-26 RX ADMIN — ONDANSETRON 4 MG: 2 INJECTION INTRAMUSCULAR; INTRAVENOUS at 07:46

## 2017-06-26 RX ADMIN — PROCHLORPERAZINE EDISYLATE 5 MG: 5 INJECTION INTRAMUSCULAR; INTRAVENOUS at 11:15

## 2017-06-26 RX ADMIN — MORPHINE SULFATE 30 MG: 30 TABLET ORAL at 11:28

## 2017-06-26 RX ADMIN — SODIUM CHLORIDE 40 MG: 9 INJECTION, SOLUTION INTRAMUSCULAR; INTRAVENOUS; SUBCUTANEOUS at 09:34

## 2017-06-26 RX ADMIN — VANCOMYCIN HYDROCHLORIDE 2000 MG: 10 INJECTION, POWDER, LYOPHILIZED, FOR SOLUTION INTRAVENOUS at 11:44

## 2017-06-26 RX ADMIN — METOCLOPRAMIDE HYDROCHLORIDE 5 MG: 5 SOLUTION ORAL at 05:00

## 2017-06-26 RX ADMIN — MORPHINE SULFATE 30 MG: 30 TABLET ORAL at 04:46

## 2017-06-26 RX ADMIN — HYDROMORPHONE HYDROCHLORIDE 2 MG: 1 INJECTION, SOLUTION INTRAMUSCULAR; INTRAVENOUS; SUBCUTANEOUS at 02:28

## 2017-06-26 RX ADMIN — Medication 10 ML: at 12:47

## 2017-06-26 RX ADMIN — SODIUM CHLORIDE, SODIUM LACTATE, POTASSIUM CHLORIDE, AND CALCIUM CHLORIDE 100 ML/HR: 600; 310; 30; 20 INJECTION, SOLUTION INTRAVENOUS at 17:21

## 2017-06-26 RX ADMIN — POTASSIUM CHLORIDE 10 MEQ: 10 INJECTION, SOLUTION INTRAVENOUS at 19:30

## 2017-06-26 RX ADMIN — PIPERACILLIN SODIUM,TAZOBACTAM SODIUM 3.38 G: 3; .375 INJECTION, POWDER, FOR SOLUTION INTRAVENOUS at 22:33

## 2017-06-26 RX ADMIN — ONDANSETRON 4 MG: 2 INJECTION INTRAMUSCULAR; INTRAVENOUS at 03:50

## 2017-06-26 NOTE — PROGRESS NOTES
Dr. Milady Rendon saw pt and informed RN to give scheduled 60 mg of MS CONTIN and PRN 30 mg of MS CONTIN now. 18    Pt still complaining of 10/10 pain and says she is \"suffering. \" RN administered 2 mg IV Dilaudid PRN for breakthrough pain. 0015    Pt sleeping at this time.

## 2017-06-26 NOTE — HOME CARE
Patient open to [facility] for SN/Wound Care with certification period through 8/20/17. Home health liaison will follow for Updated/ Resumption of SN/wound care orders prior to discharge.  Lucia Pascual RN Liaison

## 2017-06-26 NOTE — ED NOTES
Report to Larkin Snellen, RN. Pt ready for transport via [facility] to [facility].

## 2017-06-26 NOTE — PROGRESS NOTES
General Surgery Daily Progress Note    Admit Date: 2017  Post-Operative Day: * No surgery found * from * No surgery found *     Subjective:     Last 24 hrs: Pt is still having a lot of pain and nausea. Current regimen for pain isn't working. Current wound care noted. Pt mom says she should be getting silver sulfadiazine (HH has been using it)  WBC up to 27K (bands = 8) today, tmax 99.3    Objective:     Blood pressure 139/87, pulse 89, temperature 98.7 °F (37.1 °C), resp. rate 20, last menstrual period 2017, SpO2 93 %, not currently breastfeeding. Temp (24hrs), Av.6 °F (37 °C), Min:97.3 °F (36.3 °C), Max:99.3 °F (37.4 °C)      _____________________  Physical Exam:     Alert and Oriented, x3, in no acute distress.   Cardiovascular: RRR, no peripheral edema  Lungs:CTAB   Abdomen: soft, proximal wound pink w/ some depth; minimal drainage      Assessment:   Active Problems:    Abdominal pain (2017)            Plan:     Wound consult - think wound care can be nu gauze at this point  Palliative consult for overwhelming sx  Obtain UA  D/c staples  Cont abx - zosyn and vanc  Gi/dvt proph  Replete Mag and Potassium  Am labs    Data Review:    Recent Labs      17   0400  17   1614   WBC  27.6*  18.8*   HGB  12.2  12.9   HCT  37.1  38.1   PLT  264  312     Recent Labs      17   0400  17   1614   NA  134*  138   K  3.2*  4.7   CL  100  102   CO2  22  28   GLU  125*  114*   BUN  9  8   CREA  0.87  1.22*   CA  8.0*  9.1   MG  1.3*   --    PHOS  3.7   --    ALB   --   2.5*   SGOT   --   17   ALT   --   20     Recent Labs      17   1614   LPSE  67*           ______________________  Medications:    Current Facility-Administered Medications   Medication Dose Route Frequency    sodium chloride (NS) flush 5-10 mL  5-10 mL IntraVENous Q8H    sodium chloride (NS) flush 5-10 mL  5-10 mL IntraVENous PRN    acetaminophen (TYLENOL) tablet 650 mg  650 mg Oral Q4H PRN    naloxone (NARCAN) injection 0.4 mg  0.4 mg IntraVENous PRN    diphenhydrAMINE (BENADRYL) injection 12.5 mg  12.5 mg IntraVENous Q4H PRN    enoxaparin (LOVENOX) injection 40 mg  40 mg SubCUTAneous Q24H    lactated Ringers infusion  150 mL/hr IntraVENous CONTINUOUS    piperacillin-tazobactam (ZOSYN) 3.375 g in 0.9% sodium chloride (MBP/ADV) 100 mL  3.375 g IntraVENous Q8H    ondansetron (ZOFRAN) injection 4 mg  4 mg IntraVENous Q4H PRN    pantoprazole (PROTONIX) 40 mg in sodium chloride 0.9 % 10 mL injection  40 mg IntraVENous DAILY    morphine CR (MS CONTIN) tablet 60 mg  60 mg Oral TID    morphine IR (MS IR) tablet 30 mg  30 mg Oral Q6H PRN    ALPRAZolam (XANAX) tablet 1 mg  1 mg Oral BID PRN    albuterol sulfate SR (PROVENTIL SR) tablet 4 mg  4 mg Oral BID PRN    albuterol (PROVENTIL VENTOLIN) nebulizer solution 2.5 mg  2.5 mg Nebulization Q4H PRN    methylnaltrexone (RELISTOR) injection 12 mg  12 mg SubCUTAneous DAILY PRN    prochlorperazine (COMPAZINE) with saline injection 5 mg  5 mg IntraVENous Q6H PRN    Vancomycin ---Pharmacy to Dose   Other Rx Dosing/Monitoring    HYDROmorphone (PF) (DILAUDID) injection 1-2 mg  1-2 mg IntraVENous Q3H PRN    vancomycin (VANCOCIN) 2000 mg in  ml infusion  2,000 mg IntraVENous Q12H       Cindy Martin NP  6/26/2017

## 2017-06-26 NOTE — ROUTINE PROCESS
Primary Nurse Anastasiia Sanchez and Marisol Walsh RN performed a dual skin assessment on this patient Impairment noted- see wound doc flow sheet (incisional wound with staples, packing, and dry dressing.     Amador score is 20

## 2017-06-26 NOTE — ROUTINE PROCESS
TRANSFER - OUT REPORT:    Verbal report given to Jim Rebolledo RN (name) on Shirley Mcadams  being transferred to [address] [place] [address] 502(unit) for routine progression of care       Report consisted of patients Situation, Background, Assessment and   Recommendations(SBAR). Information from the following report(s) SBAR, ED Summary, Intake/Output, MAR and Recent Results was reviewed with the receiving nurse. Lines:   Peripheral IV 06/25/17 Right Antecubital (Active)   Site Assessment Clean, dry, & intact 6/25/2017  5:08 PM   Phlebitis Assessment 0 6/25/2017  5:08 PM   Infiltration Assessment 0 6/25/2017  5:08 PM   Dressing Status Clean, dry, & intact 6/25/2017  5:08 PM   Dressing Type Transparent 6/25/2017  5:08 PM   Hub Color/Line Status Pink 6/25/2017  5:08 PM        Opportunity for questions and clarification was provided.       Patient transported with:  ALS transport

## 2017-06-26 NOTE — ROUTINE PROCESS
TRANSFER - IN REPORT:    Verbal report received from [facility] RN(name) on Nighat Adan  being received from [address] [place] [address] ED(unit) for routine progression of care      Report consisted of patients Situation, Background, Assessment and   Recommendations(SBAR). Information from the following report(s) SBAR, Kardex, Intake/Output, MAR and Recent Results was reviewed with the receiving nurse. Opportunity for questions and clarification was provided. Assessment completed upon patients arrival to unit and care assumed.

## 2017-06-26 NOTE — PROGRESS NOTES
Still c/o abdominal pain this PM.  Tm 99 HR: 89 BP: 139/87 Resp Rate: 20 per minute 93% sat on room air. Exam: Cor - RRR. Lungs - Bilat BS. Clear to auscultation. Abdomen - Soft. Non distended. Tender. No rebound or guarding. Incision clean. Open area is clean and granulating. Labs - Reviewed. CT Scan - Reviewed. Will try full liquid diet. Decrease IVF to 100cc/hour. Continue IV abx - Zosyn and Vancomycin. Will add Reglan. Zofran 8mg q 8hours. Pain medication as ordered. Palliative Care to see. Will ask GI to see. Needs to be OOB.

## 2017-06-26 NOTE — H&P
Surgery History and Physical    Subjective: Brook Porras is a 46 y.o. female who is being admitted for abdominal pain. The patient underwent a diagnostic laparotomy converted to exlap with Lysis of adhesions by Dr. Sandra Zuñiga on 6/7/2017. She was discharged from the hospital on 6/21. She states that at that time she was eating ok and having normal BM. She has been having her dressing changes at home. Her wound is positive for MRSA. She is a chronic pain patient. Her normal dosing is MR contin TID and MSIR for break through. She switched to Dilaudid 2 mg for breakthrough pain upon discharge. Today she began noticing significantly more abdominal pain. She continues to have BM, but with the pain is having nausea and vomiting. Patient Active Problem List    Diagnosis Date Noted    Abdominal pain 06/25/2017    Perforated bowel ([address] 06/06/2017    Right flank pain 08/22/2011    Crohn's disease ([address] 08/15/2011    DVT (deep venous thrombosis) ([address] 08/15/2011    Incarcerated ventral hernia 08/15/2011     Past Medical History:   Diagnosis Date    Crohn's disease ([address] 8/15/2011    DVT (deep venous thrombosis) ([address] 8/15/2011    Incarcerated ventral hernia 8/15/2011    Right flank pain 8/22/2011    Seizures ([address]     Stroke [facility])       Past Surgical History:   Procedure Laterality Date    HX OTHER SURGICAL      multiple procedures related to her Crohn's disease    MS LAP, INCISIONAL HERNIA REPAIR,INCARCERATED  9-10-08    dr. Esdras Zhang      Social History   Substance Use Topics    Smoking status: Former Smoker    Smokeless tobacco: Not on file    Alcohol use No      Family History   Problem Relation Age of Onset    Hypertension Father     Stroke Father     Other Father      arthritis      Prior to Admission medications    Medication Sig Start Date End Date Taking? Authorizing Provider   sucralfate (CARAFATE) 100 mg/mL suspension Take 1 g by mouth four (4) times daily.  6/23/17   Evleia Redd MD   docusate sodium (COLACE) 100 mg capsule Take 100 mg by mouth daily. 6/22/17   Nico Soria MD   lactobacillus comb no.10 (PROBIOTIC) 20 billion cell cap Take 1 Cap by mouth two (2) times a day. 6/22/17   Nico Soria MD   HYDROmorphone (DILAUDID) 2 mg tablet Take 1 Tab by mouth every four (4) hours as needed. Max Daily Amount: 12 mg. 6/21/17   Nico Soria MD   clindamycin (CLEOCIN) 300 mg capsule Take 1 Cap by mouth three (3) times daily for 7 days. 6/21/17 6/28/17  Nico Soria MD   cyanocobalamin, vitamin B-12, 5,000 mcg TbDL Take 5,000 mcg by mouth Every Friday. Historical Provider   promethazine (PHENERGAN) 25 mg tablet Take 25 mg by mouth every six (6) hours as needed for Nausea. Tries to alternate with ondansetron    Historical Provider   albuterol sulfate SR (PROVENTIL SR) 4 mg ER tablet Take 4 mg by mouth daily as needed for Other (Wheezing). Tries to alternate with albuterol MDI when she doesn't need rapid resolution of symptoms    Historical Provider   fluticasone-vilanterol (BREO ELLIPTA) 200-25 mcg/dose inhaler Take 1 Puff by inhalation daily. Historical Provider   norgestrel-ethinyl estradiol (CRYSELLE, 28,) 0.3-30 mg-mcg tab Take 1 Tab by mouth daily. Historical Provider   esomeprazole (NEXIUM) 20 mg capsule Take 20 mg by mouth daily. Historical Provider   ondansetron (ZOFRAN ODT) 8 mg disintegrating tablet Take 8 mg by mouth every eight (8) hours as needed for Nausea. Tries to alternate with promethazine    Historical Provider   methylnaltrexone (RELISTOR) 150 mg tab tablet Take 150 mg by mouth Daily (before breakfast). Historical Provider   predniSONE (DELTASONE) 20 mg tablet Take 30 mg by mouth daily. Takes 1.5 of the 20 mg tab    Historical Provider   morphine CR (MS CONTIN) 60 mg CR tablet Take 60 mg by mouth three (3) times daily. Historical Provider   morphine IR (MS IR) 30 mg tablet Take 30 mg by mouth every four (4) hours as needed for Pain.     Historical Provider albuterol (PROVENTIL HFA, VENTOLIN HFA, PROAIR HFA) 90 mcg/actuation inhaler Take 2 Puffs by inhalation every four (4) hours as needed for Wheezing. Historical Provider   ALPRAZOLAM (XANAX PO) Take 1 mg by mouth three (3) times daily as needed. Historical Provider   INFLIXIMAB (REMICADE IV) by IntraVENous route every six (6) weeks. Historical Provider     Allergies   Allergen Reactions    Demerol [Meperidine] Unknown (comments)    Soma [Carisoprodol] Rash and Nausea Only    Sulfa (Sulfonamide Antibiotics) Unknown (comments)    Toradol [Ketorolac Tromethamine] Unknown (comments)        Review of Systems:    Constitutional: negative  Eyes: negative  Ears, Nose, Mouth, Throat, and Face: negative  Respiratory: negative  Cardiovascular: negative  Gastrointestinal: positive for nausea, vomiting and abdominal pain  Genitourinary:negative  Integument/Breast: negative  Hematologic/Lymphatic: negative  Musculoskeletal:negative  Neurological: negative  Behavioral/Psychiatric: negative  Endocrine: negative  Allergic/Immunologic: negative     Objective:     Visit Vitals    BP (!) 166/99 (BP 1 Location: Left arm, BP Patient Position: At rest)    Pulse 87    Temp 98.3 °F (36.8 °C)    Resp 20    LMP 05/21/2017 (Approximate)    SpO2 93%       Physical Exam:    GENERAL: alert, cooperative, mild distress, appears stated age, EYE: negative, THROAT & NECK: normal, LUNG: clear to auscultation bilaterally, HEART: regular rate and rhythm, ABDOMEN: soft with \"diffuse tenderness\" even with light touch no rebound or guarding. Upper midline wound is clean , EXTREMITIES:  no edema, SKIN: Normal., NEUROLOGIC: negative, PSYCH: anxious    Imaging:  images and reports reviewed  CT scan-Mesenteric edema without evidence of small bowel obstruction. Status post  colectomy.  Left lower quadrant hernia with parastomal hernia again demonstrated   Lab Review:    Recent Results (from the past 24 hour(s))   METABOLIC PANEL, COMPREHENSIVE Collection Time: 06/25/17  4:14 PM   Result Value Ref Range    Sodium 138 136 - 145 mmol/L    Potassium 4.7 3.5 - 5.1 mmol/L    Chloride 102 97 - 108 mmol/L    CO2 28 21 - 32 mmol/L    Anion gap 8 5 - 15 mmol/L    Glucose 114 (H) 65 - 100 mg/dL    BUN 8 6 - 20 MG/DL    Creatinine 1.22 (H) 0.55 - 1.02 MG/DL    BUN/Creatinine ratio 7 (L) 12 - 20      GFR est AA 59 (L) >60 ml/min/1.73m2    GFR est non-AA 49 (L) >60 ml/min/1.73m2    Calcium 9.1 8.5 - 10.1 MG/DL    Bilirubin, total 0.4 0.2 - 1.0 MG/DL    ALT (SGPT) 20 12 - 78 U/L    AST (SGOT) 17 15 - 37 U/L    Alk. phosphatase 70 45 - 117 U/L    Protein, total 7.9 6.4 - 8.2 g/dL    Albumin 2.5 (L) 3.5 - 5.0 g/dL    Globulin 5.4 (H) 2.0 - 4.0 g/dL    A-G Ratio 0.5 (L) 1.1 - 2.2     CBC WITH AUTOMATED DIFF    Collection Time: 06/25/17  4:14 PM   Result Value Ref Range    WBC 18.8 (H) 3.6 - 11.0 K/uL    RBC 4.23 3.80 - 5.20 M/uL    HGB 12.9 11.5 - 16.0 g/dL    HCT 38.1 35.0 - 47.0 %    MCV 90.1 80.0 - 99.0 FL    MCH 30.5 26.0 - 34.0 PG    MCHC 33.9 30.0 - 36.5 g/dL    RDW 14.9 (H) 11.5 - 14.5 %    PLATELET 629 531 - 136 K/uL    NEUTROPHILS 60 %    BAND NEUTROPHILS 6 %    LYMPHOCYTES 25 %    MONOCYTES 5 %    EOSINOPHILS 0 %    BASOPHILS 1 %    METAMYELOCYTES 2 %    MYELOCYTES 1 %    ABS. NEUTROPHILS 12.4 K/UL    ABS. LYMPHOCYTES 4.7 K/UL    ABS. MONOCYTES 0.9 K/UL    ABS. EOSINOPHILS 0.0 K/UL    ABS. BASOPHILS 0.2 K/UL    DF MANUAL      RBC COMMENTS POLYCHROMASIA  1+        WBC COMMENTS TOXIC GRANULATION     LIPASE    Collection Time: 06/25/17  4:14 PM   Result Value Ref Range    Lipase 67 (L) 73 - 393 U/L   NUCLEATED RBC    Collection Time: 06/25/17  4:14 PM   Result Value Ref Range    NRBC 0.0 0  WBC    ABSOLUTE NRBC 0.00 0.00 - 0.01 K/uL       Assessment:     Abdominal pain, s/p exlap KATHRINE. I suspect that most of this pain is from under medicating with a change from dilaudid po at lower dose than her base line MS IR.  Added to this she also has some mesenteric edema that may be contributing. Plan:   1. Will place patient back on her baseline medications of MS Contin and MS IR  2. Dilaudid for Break though   3. May need Palliative consult if pain doesn't get better  4. IV abx for mesenteric edema , elevated WBC and wound infection  5. IVF for rise in Creatinine and wbc. 6. Dressing changes Daily   7. Albuterol for asthma  8. dvt prophylaxis   9. Zofran/ phenergan for nausea   10.  Clear liquid diet  Signed By: Reinaldo Torres MD     June 25, 2017

## 2017-06-26 NOTE — PROGRESS NOTES
Chart reviewed. Patient has a Readmission Risk Assessment Score of 12. Patient has been readmitted within 30 days. Patient had a diagnostic laparotomy converted to exlap with Lysis of adhesions by Dr. Patrica Feliciano on 6/7/2017. She was discharged home from the hospital on 6/21/17 with [facility] [place] [facility] for Skilled nursing/wound care. Patient was re-admitted on 6/25/17 with abdominal pain and vomiting. Her wound is positive for MRSA. Wound care and palliative have been consulted. Pt is currently on IV abx. CM attempted to meet with pt this morning x2 for assessment, however pt was with another staff member and then in the restroom. CM will revisit to complete assessment. 2:25 PM: CM met with pt and mother John Paul Mullen ([phone]) at bedside to introduce role of CM and discuss discharge needs. Pt lives with her mother in a private residence in [place]. Pt stated she is mostly independent with ADLs, her mother assists her with bathing. Pt currently receives social security disability, which she reports she has been getting since 2011. Confirmed insurance is Medicare and [employer]. PCP is Dr. Sheree Turcios. CM will schedule follow-up appointment with PCP once it gets closer to discharge. Pt gets medications at [facility] in [place]. Pt is currently open with [facility] for skilled nursing for wound care. Mother will transport pt home via car at discharge. CM will follow and be available if needs arise. Care Management Interventions  PCP Verified by CM: Yes (Dr. Sheree Turcios)  Mode of Transport at Discharge:  Other (see comment) (family)  Transition of Care Consult (CM Consult): Discharge Planning  MyChart Signup: No  Discharge Durable Medical Equipment: No  Physical Therapy Consult: No  Occupational Therapy Consult: No  Speech Therapy Consult: No  Current Support Network: Lives with Caregiver  Confirm Follow Up Transport: Family  Plan discussed with Pt/Family/Caregiver: Yes  Discharge Location  Discharge Placement: Home with home health    YOLANDA HuntW/CRM

## 2017-06-26 NOTE — PROGRESS NOTES
Bedside shift change report given to [address] (oncoming nurse) by Yusuf Sahu (offgoing nurse). Report included the following information SBAR, Kardex, Intake/Output and MAR.

## 2017-06-27 ENCOUNTER — APPOINTMENT (OUTPATIENT)
Dept: GENERAL RADIOLOGY | Age: 42
DRG: 335 | End: 2017-06-27
Attending: ANESTHESIOLOGY
Payer: MEDICARE

## 2017-06-27 LAB
ANION GAP BLD CALC-SCNC: 9 MMOL/L (ref 5–15)
BASOPHILS # BLD AUTO: 0 K/UL (ref 0–0.1)
BASOPHILS # BLD: 0 % (ref 0–1)
BUN SERPL-MCNC: 6 MG/DL (ref 6–20)
BUN/CREAT SERPL: 7 (ref 12–20)
C DIFF TOX GENS STL QL NAA+PROBE: NEGATIVE
CALCIUM SERPL-MCNC: 8.2 MG/DL (ref 8.5–10.1)
CHLORIDE SERPL-SCNC: 101 MMOL/L (ref 97–108)
CO2 SERPL-SCNC: 26 MMOL/L (ref 21–32)
CREAT SERPL-MCNC: 0.89 MG/DL (ref 0.55–1.02)
DIFFERENTIAL METHOD BLD: ABNORMAL
EOSINOPHIL # BLD: 0 K/UL (ref 0–0.4)
EOSINOPHIL NFR BLD: 0 % (ref 0–7)
ERYTHROCYTE [DISTWIDTH] IN BLOOD BY AUTOMATED COUNT: 15.3 % (ref 11.5–14.5)
GLUCOSE SERPL-MCNC: 104 MG/DL (ref 65–100)
HCT VFR BLD AUTO: 37.7 % (ref 35–47)
HGB BLD-MCNC: 12.3 G/DL (ref 11.5–16)
LYMPHOCYTES # BLD AUTO: 21 % (ref 12–49)
LYMPHOCYTES # BLD: 5.4 K/UL (ref 0.8–3.5)
MCH RBC QN AUTO: 29.6 PG (ref 26–34)
MCHC RBC AUTO-ENTMCNC: 32.6 G/DL (ref 30–36.5)
MCV RBC AUTO: 90.6 FL (ref 80–99)
MONOCYTES # BLD: 1.3 K/UL (ref 0–1)
MONOCYTES NFR BLD AUTO: 5 % (ref 5–13)
NEUTS BAND NFR BLD MANUAL: 5 % (ref 0–6)
NEUTS SEG # BLD: 18.9 K/UL (ref 1.8–8)
NEUTS SEG NFR BLD AUTO: 69 % (ref 32–75)
PLATELET # BLD AUTO: 237 K/UL (ref 150–400)
PLATELET COMMENTS,PCOM: ABNORMAL
POTASSIUM SERPL-SCNC: 3.1 MMOL/L (ref 3.5–5.1)
RBC # BLD AUTO: 4.16 M/UL (ref 3.8–5.2)
RBC MORPH BLD: ABNORMAL
RBC MORPH BLD: ABNORMAL
SODIUM SERPL-SCNC: 136 MMOL/L (ref 136–145)
WBC # BLD AUTO: 25.6 K/UL (ref 3.6–11)

## 2017-06-27 PROCEDURE — C1751 CATH, INF, PER/CENT/MIDLINE: HCPCS

## 2017-06-27 PROCEDURE — 76010000093 HC SPECIAL PROCEDURE

## 2017-06-27 PROCEDURE — 74011250636 HC RX REV CODE- 250/636: Performed by: INTERNAL MEDICINE

## 2017-06-27 PROCEDURE — 74011250636 HC RX REV CODE- 250/636: Performed by: SURGERY

## 2017-06-27 PROCEDURE — 36415 COLL VENOUS BLD VENIPUNCTURE: CPT | Performed by: NURSE PRACTITIONER

## 2017-06-27 PROCEDURE — 74011000250 HC RX REV CODE- 250: Performed by: INTERNAL MEDICINE

## 2017-06-27 PROCEDURE — 76937 US GUIDE VASCULAR ACCESS: CPT

## 2017-06-27 PROCEDURE — 3331090001 HH PPS REVENUE CREDIT

## 2017-06-27 PROCEDURE — 02HV33Z INSERTION OF INFUSION DEVICE INTO SUPERIOR VENA CAVA, PERCUTANEOUS APPROACH: ICD-10-PCS | Performed by: SURGERY

## 2017-06-27 PROCEDURE — 80048 BASIC METABOLIC PNL TOTAL CA: CPT | Performed by: NURSE PRACTITIONER

## 2017-06-27 PROCEDURE — 85025 COMPLETE CBC W/AUTO DIFF WBC: CPT | Performed by: NURSE PRACTITIONER

## 2017-06-27 PROCEDURE — 87493 C DIFF AMPLIFIED PROBE: CPT | Performed by: NURSE PRACTITIONER

## 2017-06-27 PROCEDURE — 77030018719 HC DRSG PTCH ANTIMIC J&J -A

## 2017-06-27 PROCEDURE — 77010033678 HC OXYGEN DAILY

## 2017-06-27 PROCEDURE — 87040 BLOOD CULTURE FOR BACTERIA: CPT | Performed by: NURSE PRACTITIONER

## 2017-06-27 PROCEDURE — 77030027138 HC INCENT SPIROMETER -A

## 2017-06-27 PROCEDURE — 71010 XR CHEST PORT: CPT

## 2017-06-27 PROCEDURE — 74011000258 HC RX REV CODE- 258: Performed by: SURGERY

## 2017-06-27 PROCEDURE — 77030020847 HC STATLOK BARD -A

## 2017-06-27 PROCEDURE — 74011250637 HC RX REV CODE- 250/637: Performed by: SURGERY

## 2017-06-27 PROCEDURE — 65270000029 HC RM PRIVATE

## 2017-06-27 PROCEDURE — 74011000250 HC RX REV CODE- 250: Performed by: SURGERY

## 2017-06-27 PROCEDURE — 77030020365 HC SOL INJ SOD CL 0.9% 50ML

## 2017-06-27 PROCEDURE — 3331090002 HH PPS REVENUE DEBIT

## 2017-06-27 PROCEDURE — 36569 INSJ PICC 5 YR+ W/O IMAGING: CPT | Performed by: NURSE PRACTITIONER

## 2017-06-27 RX ORDER — HYDROMORPHONE HYDROCHLORIDE 1 MG/ML
1.5 INJECTION, SOLUTION INTRAMUSCULAR; INTRAVENOUS; SUBCUTANEOUS
Status: DISCONTINUED | OUTPATIENT
Start: 2017-06-27 | End: 2017-06-30 | Stop reason: SDUPTHER

## 2017-06-27 RX ORDER — PANTOPRAZOLE SODIUM 40 MG/1
40 TABLET, DELAYED RELEASE ORAL
Status: DISCONTINUED | OUTPATIENT
Start: 2017-06-28 | End: 2017-06-29

## 2017-06-27 RX ORDER — HYDROMORPHONE HYDROCHLORIDE 1 MG/ML
2 INJECTION, SOLUTION INTRAMUSCULAR; INTRAVENOUS; SUBCUTANEOUS ONCE
Status: COMPLETED | OUTPATIENT
Start: 2017-06-27 | End: 2017-06-27

## 2017-06-27 RX ORDER — HYDROMORPHONE HCL IN 0.9% NACL 15 MG/30ML
PATIENT CONTROLLED ANALGESIA VIAL INTRAVENOUS CONTINUOUS
Status: DISCONTINUED | OUTPATIENT
Start: 2017-06-27 | End: 2017-06-29

## 2017-06-27 RX ORDER — HYDROMORPHONE HYDROCHLORIDE 2 MG/ML
2 INJECTION, SOLUTION INTRAMUSCULAR; INTRAVENOUS; SUBCUTANEOUS ONCE
Status: ACTIVE | OUTPATIENT
Start: 2017-06-27 | End: 2017-06-28

## 2017-06-27 RX ADMIN — Medication 10 ML: at 19:43

## 2017-06-27 RX ADMIN — HYDROMORPHONE HYDROCHLORIDE 2 MG: 1 INJECTION, SOLUTION INTRAMUSCULAR; INTRAVENOUS; SUBCUTANEOUS at 02:12

## 2017-06-27 RX ADMIN — VANCOMYCIN HYDROCHLORIDE 2000 MG: 10 INJECTION, POWDER, LYOPHILIZED, FOR SOLUTION INTRAVENOUS at 00:42

## 2017-06-27 RX ADMIN — PIPERACILLIN SODIUM,TAZOBACTAM SODIUM 3.38 G: 3; .375 INJECTION, POWDER, FOR SOLUTION INTRAVENOUS at 20:07

## 2017-06-27 RX ADMIN — METOCLOPRAMIDE HYDROCHLORIDE 5 MG: 5 SOLUTION ORAL at 12:40

## 2017-06-27 RX ADMIN — HYDROMORPHONE HYDROCHLORIDE 2 MG: 1 INJECTION, SOLUTION INTRAMUSCULAR; INTRAVENOUS; SUBCUTANEOUS at 10:21

## 2017-06-27 RX ADMIN — PROCHLORPERAZINE EDISYLATE 5 MG: 5 INJECTION INTRAMUSCULAR; INTRAVENOUS at 19:42

## 2017-06-27 RX ADMIN — VANCOMYCIN HYDROCHLORIDE 2000 MG: 10 INJECTION, POWDER, LYOPHILIZED, FOR SOLUTION INTRAVENOUS at 20:07

## 2017-06-27 RX ADMIN — ONDANSETRON 8 MG: 4 TABLET, ORALLY DISINTEGRATING ORAL at 05:45

## 2017-06-27 RX ADMIN — MORPHINE SULFATE 60 MG: 30 TABLET, EXTENDED RELEASE ORAL at 19:42

## 2017-06-27 RX ADMIN — ENOXAPARIN SODIUM 40 MG: 40 INJECTION SUBCUTANEOUS at 22:47

## 2017-06-27 RX ADMIN — METOCLOPRAMIDE HYDROCHLORIDE 5 MG: 5 SOLUTION ORAL at 19:43

## 2017-06-27 RX ADMIN — SODIUM CHLORIDE, SODIUM LACTATE, POTASSIUM CHLORIDE, AND CALCIUM CHLORIDE 100 ML/HR: 600; 310; 30; 20 INJECTION, SOLUTION INTRAVENOUS at 19:56

## 2017-06-27 RX ADMIN — Medication: at 11:46

## 2017-06-27 RX ADMIN — PROCHLORPERAZINE EDISYLATE 5 MG: 5 INJECTION INTRAMUSCULAR; INTRAVENOUS at 22:48

## 2017-06-27 RX ADMIN — PIPERACILLIN SODIUM,TAZOBACTAM SODIUM 3.38 G: 3; .375 INJECTION, POWDER, FOR SOLUTION INTRAVENOUS at 05:46

## 2017-06-27 RX ADMIN — HYDROMORPHONE HYDROCHLORIDE 2 MG: 1 INJECTION, SOLUTION INTRAMUSCULAR; INTRAVENOUS; SUBCUTANEOUS at 07:03

## 2017-06-27 RX ADMIN — PANTOPRAZOLE SODIUM 40 MG: 40 TABLET, DELAYED RELEASE ORAL at 07:03

## 2017-06-27 RX ADMIN — METOCLOPRAMIDE HYDROCHLORIDE 5 MG: 5 SOLUTION ORAL at 07:02

## 2017-06-27 RX ADMIN — VANCOMYCIN HYDROCHLORIDE 2000 MG: 10 INJECTION, POWDER, LYOPHILIZED, FOR SOLUTION INTRAVENOUS at 12:45

## 2017-06-27 RX ADMIN — Medication 10 ML: at 05:47

## 2017-06-27 RX ADMIN — ONDANSETRON 8 MG: 4 TABLET, ORALLY DISINTEGRATING ORAL at 16:52

## 2017-06-27 RX ADMIN — PROCHLORPERAZINE EDISYLATE 5 MG: 5 INJECTION INTRAMUSCULAR; INTRAVENOUS at 02:13

## 2017-06-27 RX ADMIN — MORPHINE SULFATE 60 MG: 30 TABLET, EXTENDED RELEASE ORAL at 14:24

## 2017-06-27 RX ADMIN — PROCHLORPERAZINE EDISYLATE 5 MG: 5 INJECTION INTRAMUSCULAR; INTRAVENOUS at 09:38

## 2017-06-27 RX ADMIN — ALPRAZOLAM 1 MG: 1 TABLET ORAL at 19:42

## 2017-06-27 RX ADMIN — MORPHINE SULFATE 60 MG: 30 TABLET, EXTENDED RELEASE ORAL at 09:29

## 2017-06-27 NOTE — PROGRESS NOTES
Bedside shift change report given to Yunior Lopez (oncoming nurse) by Michelle Ball (offgoing nurse). Report included the following information SBAR, Kardex, Intake/Output, MAR and Recent Results.

## 2017-06-27 NOTE — PROCEDURES
PICC Attempt Note. Order received. PICC procedure, risks, benefits and complications reviewed with the patient and her mother. Questions answered to satisfaction. Berny Suarez  signed informed consent for bedside placement of PICC line. Patient's WBC 25.6 recieved order form OZIEL Breaux NP for blood cultures prior to PICC placement. Alexandria Agosto RN to collect them. PRE-PROCEDURE VERIFICATION  Correct Procedure: yes  Correct Site:  yes  Temperature: Temp: 99.2 °F (37.3 °C), Temperature Source: Temp Source: Oral  Recent Labs      06/27/17   1445  06/27/17   0613   BUN  6   --    CREA  0.89   --    PLT   --   237   WBC   --   25.6*   Allergies: Demerol [meperidine]; Soma [carisoprodol]; Sulfa (sulfonamide antibiotics); and Toradol [ketorolac tromethamine]  PROCEDURE DETAIL  A triple lumen PICC line was attempted for vascular access. Poor vascular availability in the right upper arm noted while scanning for vein. One vein found left upper arm in which attempt was made. Was able to access the basilic vein under ultrasound guidance but unable to advance the PICC. Case aborted and MAURI Michael notified. Order was received from [address] [place] [address] for central line placement by anesthesia.   Was xylocaine 1% used intradermally:  yes  Central Line Bundle followed yes  Complication Related to Insertion: inability to advance catheter

## 2017-06-27 NOTE — PERIOP NOTES
Radiology called for portable chest xray for central line placement for room 502B. Patient being transported via transport team.  MAURI Baumann called and updated on patient.

## 2017-06-27 NOTE — PROGRESS NOTES
General Surgery Daily Progress Note    Admit Date: 2017  Post-Operative Day: * No surgery found * from * No surgery found *     Subjective:     Last 24 hrs: Pt cont to have abd pain. Gi seeing. Some nausea, no vomiting; voiding well. Ileostomy w/ output. Difficulty this am getting labs. CBC done, not CMP. Appreciate palliative input -added PCA  Wbc remains elevated - unclear why    Objective:     Blood pressure (!) 141/92, pulse (!) 107, temperature 98.4 °F (36.9 °C), resp. rate 16, weight 316 lb 1.6 oz (143.4 kg), last menstrual period 2017, SpO2 94 %, not currently breastfeeding. Temp (24hrs), Av.4 °F (36.9 °C), Min:98 °F (36.7 °C), Max:98.9 °F (37.2 °C)      _____________________  Physical Exam:     Alert and Oriented, x3, in no acute distress.   Cardiovascular: RRR, no peripheral edema  Abdomen: obese, ileostomy w/ soft stool; wound drsg chg'd this am      Assessment:   Active Problems:    Abdominal pain (2017)            Plan:     Cont LWC   Ck c diff per GI  Dilaudid pca per palliative  PICC line - now has 2 abx, IVF, PCA, am blood draws  Gi/dvt proph  Cont abx  Cont reglan  Am labs    Data Review:    Recent Labs      17   0613  17   0400  17   1614   WBC  25.6*  27.6*  18.8*   HGB  12.3  12.2  12.9   HCT  37.7  37.1  38.1   PLT  237  264  312     Recent Labs      17   0400  17   1614   NA  134*  138   K  3.2*  4.7   CL  100  102   CO2  22  28   GLU  125*  114*   BUN  9  8   CREA  0.87  1.22*   CA  8.0*  9.1   MG  1.3*   --    PHOS  3.7   --    ALB   --   2.5*   SGOT   --   17   ALT   --   20     Recent Labs      17   1614   LPSE  67*           ______________________  Medications:    Current Facility-Administered Medications   Medication Dose Route Frequency    HYDROmorphone (PF) 15 mg/30 ml (DILAUDID) PCA   IntraVENous CONTINUOUS    prochlorperazine (COMPAZINE) with saline injection 5 mg  5 mg IntraVENous Q6H    HYDROmorphone (PF) (DILAUDID) injection 1.5 mg  1.5 mg IntraVENous Q4H PRN    [START ON 6/28/2017] pantoprazole (PROTONIX) tablet 40 mg  40 mg Oral ACB    lactated Ringers infusion  100 mL/hr IntraVENous CONTINUOUS    metoclopramide (REGLAN) 5 mg/5 mL oral syrup 5 mg  5 mg Oral AC&HS    ondansetron (ZOFRAN ODT) tablet 8 mg  8 mg Oral Q8H PRN    sodium chloride (NS) flush 5-10 mL  5-10 mL IntraVENous Q8H    sodium chloride (NS) flush 5-10 mL  5-10 mL IntraVENous PRN    acetaminophen (TYLENOL) tablet 650 mg  650 mg Oral Q4H PRN    naloxone (NARCAN) injection 0.4 mg  0.4 mg IntraVENous PRN    diphenhydrAMINE (BENADRYL) injection 12.5 mg  12.5 mg IntraVENous Q4H PRN    enoxaparin (LOVENOX) injection 40 mg  40 mg SubCUTAneous Q24H    piperacillin-tazobactam (ZOSYN) 3.375 g in 0.9% sodium chloride (MBP/ADV) 100 mL  3.375 g IntraVENous Q8H    morphine CR (MS CONTIN) tablet 60 mg  60 mg Oral TID    ALPRAZolam (XANAX) tablet 1 mg  1 mg Oral BID PRN    albuterol sulfate SR (PROVENTIL SR) tablet 4 mg  4 mg Oral BID PRN    albuterol (PROVENTIL VENTOLIN) nebulizer solution 2.5 mg  2.5 mg Nebulization Q4H PRN    methylnaltrexone (RELISTOR) injection 12 mg  12 mg SubCUTAneous DAILY PRN    Vancomycin ---Pharmacy to Dose   Other Rx Dosing/Monitoring    vancomycin (VANCOCIN) 2000 mg in  ml infusion  2,000 mg IntraVENous Q12H       Nicolas Altman NP  6/27/2017      Pt seen and examined  Still having pain but it is improving  Gen- Alert in NAD  Lungs- CTA  H-RRR  Abd- s/much less tender  +BS    WBC 25k    A/p posoperative abdominal pain  Appreciate Gi and Palliative input  Continue abx   PICC line for access.   Continue dressing changes  Monitor wbc

## 2017-06-27 NOTE — WOUND CARE
WOCN Note:     New consult placed by NP for abdominal wound. Chart shows:  Admitted for nausea, vomiting, and abdominal pain; history of Crohn's, DVT, bowel perforation with surgery 6/7/17. WBC = 25.6  On vancomycin    Assessment:   Patient is A&O x 4, continent and mobile. Bed: [facility] (weighs 316 lbs and is 5'4\")  Patient reports no pain. Midline abdominal incision with proximal dehiscence = 3 x 1 x 6.2 cm visible base is moist pink. No odor or purulence. Track is shorter than on previous admission. Periwound has no erythema. Distal incision well-approximated and with steri-strips. Recommendations:  Abdomen: pack daily with saline-moist gauze with Carrasyn gel added, dry topper and secure. Bariatric bed ordered via [place]. Discussed with patient and RN and NP, No Camacho at bedside.      Will continue to follow weekly while admitted to hospital.    SONY Finney, RN, [facility] [place]  Certified Wound, Ostomy, Continence Nurse  office [phone]  pager [phone] or call  to page

## 2017-06-27 NOTE — PROCEDURES
Central Line Placement    Start time: 6/27/2017 6:21 PM  End time: 6/27/2017 6:31 PM  Performed by: Doretha Leblanc  Authorized by: Sunny RONQUILLO     Indications: vascular access  Preanesthetic Checklist: patient identified, risks and benefits discussed, anesthesia consent, site marked, patient being monitored and timeout performed    Timeout Time: 18:21       Pre-procedure: All elements of maximal sterile barrier technique followed?  Yes    Maximal barrier precautions followed, 2% Chlorhexidine for cutaneous antisepsis and Hand hygiene performed prior to catheter insertion            Procedure:   Prep:  Chlorhexidine  Location:  Internal jugular  Orientation:  Right  Patient position:  Trendelenburg  Catheter type:  Quad lumen  Catheter size:  8 Fr  Catheter length:  16 cm  Number of attempts:  1  Successful placement: Yes      Assessment:   Post-procedure:  Catheter secured, sterile dressing applied and sterile dressing with CHG applied  Assessment:  Blood return through all ports, placement verified by x-ray and guidewire removal verified  Insertion:  Uncomplicated  Patient tolerance:  Patient tolerated the procedure well with no immediate complications

## 2017-06-27 NOTE — CONSULTS
Palliative Medicine Consult  [place]: [phone] ([phone])    Patient Name: Kat Hampton  YOB: 1975    Date of Initial Consult: 6/27/17  Reason for Consult: overwhelming symptoms  Requesting Provider: Cate Milian NP  Primary Care Physician: Samuel Royal MD      SUMMARY:   Kat Hampton is a 46 y.o. with a past history of Crohn's disease,anxiety,  chronic pain, hx DVT, multiple (4) small bowel resections, s/p recent urgent dx lap, with lysis of adhesions, repair of suspected perf 6/7/17 , who was admitted on 6/25/2017 from home with a diagnosis of worsening abdominal pain, elevated WBC and MRSA wound infection. Current medical issues leading to Palliative Medicine involvement include: Crohn's flare (seen on enteroscopy), abdominal pain, MRSA wound infection, post op wound pain, nausea. .    Patient is followed chronically for pain management by Dr. Donna Driscoll at [facility]. Home regimen for chronic abdominal pain is MS Contin 60 Q8 and MS IR 30 Q6 PRN. She also takes Xanax 1mg TID prn anxiety   PALLIATIVE DIAGNOSES:   1. Acute on chronic abdominal pain. Incision (post op) , and also diffuse. 2. Nausea  3. Crohn's disease (currently off meds due to acute infection/surgery)  4. MRSA wound          PLAN:   1. Pain   1. Continue home regimen of MS Contin 60 H1wrawm. 2. D/c MS IR for now  3. Start dilaudid PCA 0.2mg IV Q6min . No basal rate. 4. Dilaudid 1.5mg IV Q4hr PRN pain not controlled by PCA. 5. OK to give Dilaudid 2mg now (30min early) due to uncontrolled pain, while waiting for PCA to be available. 2. Nausea  1. Continue scheduled reglan  2. Change compazine to scheduled Q6 for a few days  3. Continue PRN zofran  3. Encourage increase ambulation and activity. 4. Initial consult note routed to primary continuity provider  5.  Communicated plan of care with: Palliative IDT       GOALS OF CARE / TREATMENT PREFERENCES:   [====Goals of Care====]  GOALS OF CARE:  Patient / health care proxy stated goals: pain control  Recovery from acute issues      TREATMENT PREFERENCES:   Code Status: Full Code    Advance Care Planning:  Advance Care Planning 6/26/2017   Patient's Healthcare Decision Maker is: Legal Next of Camilo Jiménez   Primary Decision Maker Name Abimael Arguello   Primary Decision Maker Phone Number [phone]   Primary Decision Maker Relationship to Patient Parent   Confirm Advance Directive None   Patient Would Like to Complete Advance Directive No       Other:    The palliative care team has discussed with patient / health care proxy about goals of care / treatment preferences for patient.  [====Goals of Care====]         HISTORY:     History obtained from: chart, patient    CHIEF COMPLAINT: admitted with abdominal pain    HPI/SUBJECTIVE:    The patient is:   [] Verbal and participatory  [] Non-participatory due to:   46year old   Recent abdominal surgery 6/7/17   Went home 6/21  Returned 6/25 with acute increase in abdominal pain  Which feels like her crohn's flares. Wound is not really as painful as rest of abdomen. It was 10/10 at home, really unbearable. Also nauseated. Dilaudid helping here, but doesn't last long. Pain at best 5/10 right now 8/10  Also nauseated all the time  Compazine helps the best, zofran doesn't work much but a little    Feeling more thirsty than usual     Has chronic pain from crohns, well managed on current morphine doses at home. Allows her to function. She follows at [facility] with Dr. Desirae Vera.      Clinical Pain Assessment (nonverbal scale for severity on nonverbal patients):   [++++ Clinical Pain Assessment++++]  [++++Pain Severity++++]: Pain: 8  [++++Pain Character++++]: sharp and burning, stabbing  [++++Pain Duration++++]: several days  [++++Pain Effect++++]: hard to walk, feeling anxious  [++++Pain Factors++++]: better after pain medicaion  [++++Pain Frequency++++]: constant  [++++Pain Location++++]: across abdomen both sides in middle  [++++ Clinical Pain Assessment++++]     FUNCTIONAL ASSESSMENT:     Palliative Performance Scale (PPS):  PPS: 50       PSYCHOSOCIAL/SPIRITUAL SCREENING:     Advance Care Planning:  Advance Care Planning 6/26/2017   Patient's Healthcare Decision Maker is: Legal Next of Camilo Jiménez   Primary Decision Maker Name Josiane Lee   Primary Decision Maker Phone Number [phone]   Primary Decision Maker Relationship to Patient Parent   Confirm Advance Directive None   Patient Would Like to Complete Advance Directive No        Any spiritual / Advent concerns:  [] Yes /  [x] No    Caregiver Burnout:  [] Yes /  [x] No /  [] No Caregiver Present      Anticipatory grief assessment:   [x] Normal  / [] Maladaptive       ESAS Anxiety: Anxiety: 5    ESAS Depression: Depression: 2        REVIEW OF SYSTEMS:     Positive and pertinent negative findings in ROS are noted above in HPI. The following systems were [x] reviewed / [] unable to be reviewed as noted in HPI  Other findings are noted below. Systems: constitutional, ears/nose/mouth/throat, respiratory, gastrointestinal, genitourinary, musculoskeletal, integumentary, neurologic, psychiatric, endocrine. Positive findings noted below. Modified ESAS Completed by: provider   Fatigue: 5 Drowsiness: 0   Depression: 2 Pain: 8   Anxiety: 5 Nausea: 8   Anorexia: 5 Dyspnea: 0     Constipation: No              PHYSICAL EXAM:     From RN flowsheet:  Wt Readings from Last 3 Encounters:   06/27/17 316 lb 1.6 oz (143.4 kg)   06/25/17 343 lb (155.6 kg)   06/22/17 343 lb (155.6 kg)     Blood pressure (!) 141/92, pulse (!) 107, temperature 98.4 °F (36.9 °C), resp. rate 16, weight 316 lb 1.6 oz (143.4 kg), last menstrual period 05/21/2017, SpO2 94 %, not currently breastfeeding.     Pain Scale 1: Numeric (0 - 10)  Pain Intensity 1: 7  Pain Onset 1: Chronic  Pain Location 1: Abdomen  Pain Orientation 1: Mid  Pain Description 1:  (feels like a bad bloated feeling)  Pain Intervention(s) 1: Medication (see MAR)  Last bowel movement, if known:     Constitutional: WDWN female NAD  Eyes: pupils equal, anicteric  ENMT: no nasal discharge, moist mucous membranes  Cardiovascular: regular rhythm, distal pulses intact  Respiratory: breathing not labored, symmetric  Gastrointestinal: midline incision healing, no exudate +ostomy w stool  Musculoskeletal: no deformity, no tenderness to palpation  Skin: warm, dry  No edema  Neurologic: following commands, moving all extremities  Able to ambulate  Psychiatric: full affect, anxious/almost tearful at times  Other:       HISTORY:     Active Problems:    Abdominal pain (6/25/2017)      Past Medical History:   Diagnosis Date    Cro[facility]'s disease ([facility] 8/15/2011    [facility] 8/15/2011    Incarcerated ventral hernia 8/15/2011    Right flank pain 8/22/2011    Seizures ([facility]     Stroke [facility])       Past Surgical History:   Procedure Laterality Date    HX OTHER SURGICAL      multiple procedures related to her Crohn's disease    AK LAP, INCISIONAL HERNIA REPAIR,INCARCERATED  9-10-08    dr. Nupur Wetzel      Family History   Problem Relation Age of Onset    Hypertension Father     Stroke Father     Other Father      arthritis      History reviewed, no pertinent family history.   Social History   Substance Use Topics    Smoking status: Former Smoker    Smokeless tobacco: Not on file    Alcohol use No     Allergies   Allergen Reactions    Demerol [Meperidine] Unknown (comments)    Soma [Carisoprodol] Rash and Nausea Only    Sulfa (Sulfonamide Antibiotics) Unknown (comments)    Toradol [Ketorolac Tromethamine] Unknown (comments)      Current Facility-Administered Medications   Medication Dose Route Frequency    HYDROmorphone (PF) 15 mg/30 ml (DILAUDID) PCA   IntraVENous CONTINUOUS    prochlorperazine (COMPAZINE) with saline injection 5 mg  5 mg IntraVENous Q6H    HYDROmorphone (PF) (DILAUDID) injection 1.5 mg  1.5 mg IntraVENous Q4H PRN    pantoprazole (PROTONIX) tablet 40 mg  40 mg Oral ACB    lactated Ringers infusion  100 mL/hr IntraVENous CONTINUOUS    metoclopramide (REGLAN) 5 mg/5 mL oral syrup 5 mg  5 mg Oral AC&HS    ondansetron (ZOFRAN ODT) tablet 8 mg  8 mg Oral Q8H PRN    sodium chloride (NS) flush 5-10 mL  5-10 mL IntraVENous Q8H    sodium chloride (NS) flush 5-10 mL  5-10 mL IntraVENous PRN    acetaminophen (TYLENOL) tablet 650 mg  650 mg Oral Q4H PRN    naloxone (NARCAN) injection 0.4 mg  0.4 mg IntraVENous PRN    diphenhydrAMINE (BENADRYL) injection 12.5 mg  12.5 mg IntraVENous Q4H PRN    enoxaparin (LOVENOX) injection 40 mg  40 mg SubCUTAneous Q24H    piperacillin-tazobactam (ZOSYN) 3.375 g in 0.9% sodium chloride (MBP/ADV) 100 mL  3.375 g IntraVENous Q8H    morphine CR (MS CONTIN) tablet 60 mg  60 mg Oral TID    ALPRAZolam (XANAX) tablet 1 mg  1 mg Oral BID PRN    albuterol sulfate SR (PROVENTIL SR) tablet 4 mg  4 mg Oral BID PRN    albuterol (PROVENTIL VENTOLIN) nebulizer solution 2.5 mg  2.5 mg Nebulization Q4H PRN    methylnaltrexone (RELISTOR) injection 12 mg  12 mg SubCUTAneous DAILY PRN    Vancomycin ---Pharmacy to Dose   Other Rx Dosing/Monitoring    vancomycin (VANCOCIN) 2000 mg in  ml infusion  2,000 mg IntraVENous Q12H          LAB AND IMAGING FINDINGS:     Lab Results   Component Value Date/Time    WBC 25.6 06/27/2017 06:13 AM    HGB 12.3 06/27/2017 06:13 AM    PLATELET 105 94/16/2439 06:13 AM     Lab Results   Component Value Date/Time    Sodium 134 06/26/2017 04:00 AM    Potassium 3.2 06/26/2017 04:00 AM    Chloride 100 06/26/2017 04:00 AM    CO2 22 06/26/2017 04:00 AM    BUN 9 06/26/2017 04:00 AM    Creatinine 0.87 06/26/2017 04:00 AM    Calcium 8.0 06/26/2017 04:00 AM    Magnesium 1.3 06/26/2017 04:00 AM    Phosphorus 3.7 06/26/2017 04:00 AM      Lab Results   Component Value Date/Time    AST (SGOT) 17 06/25/2017 04:14 PM    Alk.  phosphatase 70 06/25/2017 04:14 PM    Protein, total 7.9 06/25/2017 04:14 PM Albumin 2.5 06/25/2017 04:14 PM    Globulin 5.4 06/25/2017 04:14 PM     No results found for: INR, PTMR, PTP, PT1, PT2, APTT   No results found for: IRON, FE, TIBC, IBCT, PSAT, FERR   No results found for: PH, PCO2, PO2  No components found for: GLPOC   No results found for: CPK, CKMB             Total time:   Counseling / coordination time, spent as noted above:   > 50% counseling / coordination?:     Prolonged service was provided for  []30 min   []75 min in face to face time in the presence of the patient, spent as noted above. Time Start:   Time End:   Note: this can only be billed with 57762 (initial) or 19780 (follow up). If multiple start / stop times, list each separately.

## 2017-06-27 NOTE — DISCHARGE SUMMARY
Physician Discharge Summary     Patient ID:  Aiden Del Valle  [id number]  82 y.o.  1975    Admit Date: 6/6/2017    Discharge Date: 6/21/2017    * Admission Diagnoses: RETAINED FOREIGN BODY;small bowel perforation;Perforated mary anne*    * Discharge Diagnoses:    Hospital Problems as of 6/21/2017  Date Reviewed: 6/6/2017          Codes Class Noted - Resolved POA    Perforated bowel ([address] ICD-10-CM: K63.1  ICD-9-CM: 569.83  6/6/2017 - Present Unknown               Admission Condition: Fair    * Discharge Condition: improved    * Procedures: Procedure(s):  Diagnostic Laparoscopy, converted to open Exploratory Laparotomy, Lysis of adhesions for 7 hours/ Intraoperative Upper Endoscopy by Dr. Jeny Earl. [facility] Course:   Ms. Codi Aquino is a 47 yo woman with Crohn's disease who had a retained capsule study who underwent an single balloon enteroscopy. She had an iatrogenic bowel perforation during the enteroscopy so General Surgery was consulted for an ex lap. She was taken emergently for an ex lap and lysis of adhesions. She tolerated the procedure well. Post-operatively she developed an MRSA wound infection and was treated with Vancomycin. Her infection improved so she was discharged home with oral antibiotic and pain control. She was setup for home health wound care. Consults: Gastroenterology    Significant Diagnostic Studies: endoscopy: Enteroscopy    * Disposition: Home    Discharge Medications:   Discharge Medication List as of 6/21/2017  9:49 AM      START taking these medications    Details   HYDROmorphone (DILAUDID) 2 mg tablet Take 1 Tab by mouth every four (4) hours as needed. Max Daily Amount: 12 mg., Print, Disp-40 Tab, R-0      clindamycin (CLEOCIN) 300 mg capsule Take 1 Cap by mouth three (3) times daily for 7 days. , Print, Disp-21 Cap, R-0         CONTINUE these medications which have NOT CHANGED    Details   cyanocobalamin, vitamin B-12, 5,000 mcg TbDL Take 5,000 mcg by mouth Every Friday. , Historical Med      promethazine (PHENERGAN) 25 mg tablet Take 25 mg by mouth every six (6) hours as needed for Nausea. Tries to alternate with ondansetron, Historical Med      albuterol sulfate SR (PROVENTIL SR) 4 mg ER tablet Take 4 mg by mouth daily as needed for Other (Wheezing). Tries to alternate with albuterol MDI when she doesn't need rapid resolution of symptoms, Historical Med      fluticasone-vilanterol (BREO ELLIPTA) 200-25 mcg/dose inhaler Take 1 Puff by inhalation daily. , Historical Med      norgestrel-ethinyl estradiol (CRYSELLE, 28,) 0.3-30 mg-mcg tab Take 1 Tab by mouth daily. , Historical Med      esomeprazole (NEXIUM) 20 mg capsule Take 20 mg by mouth daily. , Historical Med      ondansetron (ZOFRAN ODT) 8 mg disintegrating tablet Take 8 mg by mouth every eight (8) hours as needed for Nausea. Tries to alternate with promethazine, Historical Med      methylnaltrexone (RELISTOR) 150 mg tab tablet Take 150 mg by mouth Daily (before breakfast). , Historical Med      predniSONE (DELTASONE) 20 mg tablet Take 30 mg by mouth daily. Takes 1.5 of the 20 mg tab, Historical Med      morphine CR (MS CONTIN) 60 mg CR tablet Take 60 mg by mouth three (3) times daily. , Historical Med      morphine IR (MS IR) 30 mg tablet Take 30 mg by mouth every four (4) hours as needed for Pain., Historical Med      albuterol (PROVENTIL HFA, VENTOLIN HFA, PROAIR HFA) 90 mcg/actuation inhaler Take 2 Puffs by inhalation every four (4) hours as needed for Wheezing., Historical Med      ALPRAZOLAM (XANAX PO) Take 1 mg by mouth three (3) times daily as needed., Historical Med      INFLIXIMAB (REMICADE IV) by IntraVENous route every six (6) weeks. , Historical Med             * Follow-up Care/Patient Instructions:   Activity: No lifting, Driving, or Strenuous exercise for 2 weeks  Diet: Low fat, Low cholesterol  Wound Care: As directed    Follow-up Information     Follow up With Details Comments [address] Call in 1 day home health wound care. Please call if you have not heard from [facility] by 11AM the day after discharge.   [address], [address] [place] [address]  [phone]          Follow-up tests/labs None    Signed:  Mary Jane Munguia MD  6/27/2017  11:20 AM

## 2017-06-27 NOTE — PROGRESS NOTES
[address]  [place], [address]       GI PROGRESS NOTE  Juwan [facility]burn [facility]  [phone]    NAME: Haja Kohli   :  1975   MRN:  [id number]       Subjective:     Still has pain but it is controlled with pain medication    Objective:     VITALS:   Last 24hrs VS reviewed since prior progress note. Most recent are:  Visit Vitals    BP (!) 141/92    Pulse (!) 107    Temp 98.4 °F (36.9 °C)    Resp 16    Wt 143.4 kg (316 lb 1.6 oz)    SpO2 94%    Breastfeeding No    BMI 54.26 kg/m2       PHYSICAL EXAM:  General: Cooperative  Neurologic:  Alert and oriented X 3. HEENT: PERRLA, EOMI.    Lungs:  No Wheezing  Heart:  s1 s2   Abdomen: Soft, obese, Non distended, tender to incision.  +Bowel sounds  Extremities: No edema  Psych:   tearful    Lab Data Reviewed:     Recent Results (from the past 24 hour(s))   URINALYSIS W/ REFLEX CULTURE    Collection Time: 17  2:35 PM   Result Value Ref Range    Color YELLOW/STRAW      Appearance CLEAR CLEAR      Specific gravity 1.011 1.003 - 1.030      pH (UA) 7.0 5.0 - 8.0      Protein NEGATIVE  NEG mg/dL    Glucose NEGATIVE  NEG mg/dL    Ketone NEGATIVE  NEG mg/dL    Bilirubin NEGATIVE  NEG      Blood NEGATIVE  NEG      Urobilinogen 0.2 0.2 - 1.0 EU/dL    Nitrites NEGATIVE  NEG      Leukocyte Esterase NEGATIVE  NEG      WBC 0-4 0 - 4 /hpf    RBC 0-5 0 - 5 /hpf    Epithelial cells FEW FEW /lpf    Bacteria NEGATIVE  NEG /hpf    UA:UC IF INDICATED CULTURE NOT INDICATED BY UA RESULT CNI      Hyaline cast 0-2 0 - 5 /lpf   CBC WITH AUTOMATED DIFF    Collection Time: 17  6:13 AM   Result Value Ref Range    WBC 25.6 (H) 3.6 - 11.0 K/uL    RBC 4.16 3.80 - 5.20 M/uL    HGB 12.3 11.5 - 16.0 g/dL    HCT 37.7 35.0 - 47.0 %    MCV 90.6 80.0 - 99.0 FL    MCH 29.6 26.0 - 34.0 PG    MCHC 32.6 30.0 - 36.5 g/dL    RDW 15.3 (H) 11.5 - 14.5 %    PLATELET 499 908 - 875 K/uL    NEUTROPHILS 69 32 - 75 %    BAND NEUTROPHILS 5 0 - 6 % LYMPHOCYTES 21 12 - 49 %    MONOCYTES 5 5 - 13 %    EOSINOPHILS 0 0 - 7 %    BASOPHILS 0 0 - 1 %    ABS. NEUTROPHILS 18.9 (H) 1.8 - 8.0 K/UL    ABS. LYMPHOCYTES 5.4 (H) 0.8 - 3.5 K/UL    ABS. MONOCYTES 1.3 (H) 0.0 - 1.0 K/UL    ABS. EOSINOPHILS 0.0 0.0 - 0.4 K/UL    ABS. BASOPHILS 0.0 0.0 - 0.1 K/UL    DF MANUAL      PLATELET COMMENTS LARGE PLATELETS      RBC COMMENTS ANISOCYTOSIS  1+        RBC COMMENTS POLYCHROMASIA  PRESENT             ________________________________________________________________________       Assessment:   · Abdominal pain with leukocytosis - No abscess on CT scan and wound does not appear to be worse. She was sent home on clindamycin      Patient Active Problem List   Diagnosis Code    Crohn's disease ([address] K50.90    DVT (deep venous thrombosis) ([address] I82.409    Incarcerated ventral hernia K46.0    Right flank pain R10.9    Perforated bowel ([address] K63.1    Abdominal pain R10.9     Plan:   · Discussed with surgery. Will order c-diff. Begin entocort for IBD management. · Supportive care     Signed By: Renetta Alfonso NP     6/27/2017  10:49 AM       GI Attending: Lise Hutton to see patient and she was off the floor. Agree with above. Will await c diff results. Appreciate surgery team's care. Cain Dominguez MD

## 2017-06-27 NOTE — PROGRESS NOTES
MEWS 3: 98.0, 111, 16, 99% 2 L, 160/97. Called Wilbur Marcelino NP. Reported adm IV pain med, & placed pt on 2L O2 humidified oxygen. Urine output 1200 ml. Ileostomy output 100 ml. C/O chronic pain issues. Antibiotics Zosyn & Vanc. Tylenol available if needed for fever. No orders given.

## 2017-06-28 ENCOUNTER — APPOINTMENT (OUTPATIENT)
Dept: CT IMAGING | Age: 42
DRG: 335 | End: 2017-06-28
Attending: NURSE PRACTITIONER
Payer: MEDICARE

## 2017-06-28 ENCOUNTER — APPOINTMENT (OUTPATIENT)
Dept: GENERAL RADIOLOGY | Age: 42
DRG: 335 | End: 2017-06-28
Attending: SURGERY
Payer: MEDICARE

## 2017-06-28 ENCOUNTER — ANESTHESIA EVENT (OUTPATIENT)
Dept: SURGERY | Age: 42
DRG: 335 | End: 2017-06-28
Payer: MEDICARE

## 2017-06-28 ENCOUNTER — ANESTHESIA (OUTPATIENT)
Dept: SURGERY | Age: 42
DRG: 335 | End: 2017-06-28
Payer: MEDICARE

## 2017-06-28 LAB
ABO + RH BLD: NORMAL
ALBUMIN SERPL BCP-MCNC: 2 G/DL (ref 3.5–5)
ALBUMIN/GLOB SERPL: 0.4 {RATIO} (ref 1.1–2.2)
ALP SERPL-CCNC: 80 U/L (ref 45–117)
ALT SERPL-CCNC: 28 U/L (ref 12–78)
ANION GAP BLD CALC-SCNC: 10 MMOL/L (ref 5–15)
AST SERPL W P-5'-P-CCNC: 17 U/L (ref 15–37)
BASOPHILS # BLD AUTO: 0.2 K/UL (ref 0–0.1)
BASOPHILS # BLD: 1 % (ref 0–1)
BILIRUB SERPL-MCNC: 0.5 MG/DL (ref 0.2–1)
BLOOD GROUP ANTIBODIES SERPL: NORMAL
BUN SERPL-MCNC: 4 MG/DL (ref 6–20)
BUN/CREAT SERPL: 4 (ref 12–20)
CALCIUM SERPL-MCNC: 8.3 MG/DL (ref 8.5–10.1)
CHLORIDE SERPL-SCNC: 103 MMOL/L (ref 97–108)
CO2 SERPL-SCNC: 29 MMOL/L (ref 21–32)
CREAT SERPL-MCNC: 1.08 MG/DL (ref 0.55–1.02)
DIFFERENTIAL METHOD BLD: ABNORMAL
EOSINOPHIL # BLD: 0.2 K/UL (ref 0–0.4)
EOSINOPHIL NFR BLD: 1 % (ref 0–7)
ERYTHROCYTE [DISTWIDTH] IN BLOOD BY AUTOMATED COUNT: 14.8 % (ref 11.5–14.5)
GLOBULIN SER CALC-MCNC: 4.6 G/DL (ref 2–4)
GLUCOSE SERPL-MCNC: 101 MG/DL (ref 65–100)
HCT VFR BLD AUTO: 35.3 % (ref 35–47)
HGB BLD-MCNC: 11.3 G/DL (ref 11.5–16)
LYMPHOCYTES # BLD AUTO: 12 % (ref 12–49)
LYMPHOCYTES # BLD: 3 K/UL (ref 0.8–3.5)
MAGNESIUM SERPL-MCNC: 1.5 MG/DL (ref 1.6–2.4)
MCH RBC QN AUTO: 29.3 PG (ref 26–34)
MCHC RBC AUTO-ENTMCNC: 32 G/DL (ref 30–36.5)
MCV RBC AUTO: 91.5 FL (ref 80–99)
MONOCYTES # BLD: 2.5 K/UL (ref 0–1)
MONOCYTES NFR BLD AUTO: 10 % (ref 5–13)
NEUTS BAND NFR BLD MANUAL: 1 % (ref 0–6)
NEUTS SEG # BLD: 18.9 K/UL (ref 1.8–8)
NEUTS SEG NFR BLD AUTO: 75 % (ref 32–75)
PHOSPHATE SERPL-MCNC: 2.4 MG/DL (ref 2.6–4.7)
PLATELET # BLD AUTO: 251 K/UL (ref 150–400)
PLATELET COMMENTS,PCOM: ABNORMAL
POTASSIUM SERPL-SCNC: 3 MMOL/L (ref 3.5–5.1)
PROT SERPL-MCNC: 6.6 G/DL (ref 6.4–8.2)
RBC # BLD AUTO: 3.86 M/UL (ref 3.8–5.2)
RBC MORPH BLD: ABNORMAL
RBC MORPH BLD: ABNORMAL
SODIUM SERPL-SCNC: 142 MMOL/L (ref 136–145)
SPECIMEN EXP DATE BLD: NORMAL
VANCOMYCIN SERPL-MCNC: 22.9 UG/ML
WBC # BLD AUTO: 24.8 K/UL (ref 3.6–11)

## 2017-06-28 PROCEDURE — 76010000140 HC OR TIME 6 TO 6.5 HR: Performed by: SURGERY

## 2017-06-28 PROCEDURE — 77030003704 HC NDL VASC ACC ARMD -A: Performed by: SURGERY

## 2017-06-28 PROCEDURE — 74011250637 HC RX REV CODE- 250/637: Performed by: SURGERY

## 2017-06-28 PROCEDURE — 74011250636 HC RX REV CODE- 250/636: Performed by: INTERNAL MEDICINE

## 2017-06-28 PROCEDURE — 87077 CULTURE AEROBIC IDENTIFY: CPT | Performed by: NURSE PRACTITIONER

## 2017-06-28 PROCEDURE — 77030010516 HC APPL HEMA CLP TELE -B: Performed by: SURGERY

## 2017-06-28 PROCEDURE — 77030026438 HC STYL ET INTUB CARD -A: Performed by: ANESTHESIOLOGY

## 2017-06-28 PROCEDURE — 77030032490 HC SLV COMPR SCD KNE COVD -B: Performed by: SURGERY

## 2017-06-28 PROCEDURE — 0D9670Z DRAINAGE OF STOMACH WITH DRAINAGE DEVICE, VIA NATURAL OR ARTIFICIAL OPENING: ICD-10-PCS | Performed by: SURGERY

## 2017-06-28 PROCEDURE — 87186 SC STD MICRODIL/AGAR DIL: CPT | Performed by: NURSE PRACTITIONER

## 2017-06-28 PROCEDURE — C1876 STENT, NON-COA/NON-COV W/DEL: HCPCS | Performed by: SURGERY

## 2017-06-28 PROCEDURE — P9045 ALBUMIN (HUMAN), 5%, 250 ML: HCPCS

## 2017-06-28 PROCEDURE — 74011250636 HC RX REV CODE- 250/636: Performed by: NURSE PRACTITIONER

## 2017-06-28 PROCEDURE — 77030018836 HC SOL IRR NACL ICUM -A: Performed by: SURGERY

## 2017-06-28 PROCEDURE — 36415 COLL VENOUS BLD VENIPUNCTURE: CPT | Performed by: SURGERY

## 2017-06-28 PROCEDURE — 80053 COMPREHEN METABOLIC PANEL: CPT | Performed by: NURSE PRACTITIONER

## 2017-06-28 PROCEDURE — 87147 CULTURE TYPE IMMUNOLOGIC: CPT | Performed by: NURSE PRACTITIONER

## 2017-06-28 PROCEDURE — 77030002966 HC SUT PDS J&J -A: Performed by: SURGERY

## 2017-06-28 PROCEDURE — 86900 BLOOD TYPING SEROLOGIC ABO: CPT | Performed by: ANESTHESIOLOGY

## 2017-06-28 PROCEDURE — 77030010541: Performed by: SURGERY

## 2017-06-28 PROCEDURE — 80202 ASSAY OF VANCOMYCIN: CPT | Performed by: SURGERY

## 2017-06-28 PROCEDURE — 77030008027

## 2017-06-28 PROCEDURE — 76001 XR FLUOROSCOPY OVER 60 MINUTES: CPT

## 2017-06-28 PROCEDURE — 74011250636 HC RX REV CODE- 250/636: Performed by: SURGERY

## 2017-06-28 PROCEDURE — 65270000029 HC RM PRIVATE

## 2017-06-28 PROCEDURE — 84100 ASSAY OF PHOSPHORUS: CPT | Performed by: SURGERY

## 2017-06-28 PROCEDURE — C1769 GUIDE WIRE: HCPCS | Performed by: SURGERY

## 2017-06-28 PROCEDURE — 74177 CT ABD & PELVIS W/CONTRAST: CPT

## 2017-06-28 PROCEDURE — 77030034850: Performed by: SURGERY

## 2017-06-28 PROCEDURE — 77030013060 HC DEV INFL PRSS MRTM -B: Performed by: SURGERY

## 2017-06-28 PROCEDURE — 3331090002 HH PPS REVENUE DEBIT

## 2017-06-28 PROCEDURE — 77030011264 HC ELECTRD BLD EXT COVD -A: Performed by: SURGERY

## 2017-06-28 PROCEDURE — C1757 CATH, THROMBECTOMY/EMBOLECT: HCPCS | Performed by: SURGERY

## 2017-06-28 PROCEDURE — 77030018846 HC SOL IRR STRL H20 ICUM -A: Performed by: SURGERY

## 2017-06-28 PROCEDURE — 74011000258 HC RX REV CODE- 258: Performed by: SURGERY

## 2017-06-28 PROCEDURE — 77030008684 HC TU ET CUF COVD -B: Performed by: ANESTHESIOLOGY

## 2017-06-28 PROCEDURE — C1876 STENT, NON-COA/NON-COV W/DEL: HCPCS

## 2017-06-28 PROCEDURE — 77030016708 HC CATH ANGI DX SUPT2 CARD -B: Performed by: SURGERY

## 2017-06-28 PROCEDURE — 0DNW0ZZ RELEASE PERITONEUM, OPEN APPROACH: ICD-10-PCS | Performed by: SURGERY

## 2017-06-28 PROCEDURE — 74011000250 HC RX REV CODE- 250: Performed by: INTERNAL MEDICINE

## 2017-06-28 PROCEDURE — 77030010507 HC ADH SKN DERMBND J&J -B: Performed by: SURGERY

## 2017-06-28 PROCEDURE — 74011250636 HC RX REV CODE- 250/636

## 2017-06-28 PROCEDURE — 77030011640 HC PAD GRND REM COVD -A: Performed by: SURGERY

## 2017-06-28 PROCEDURE — 76060000043 HC ANESTHESIA 6 TO 6.5 HR: Performed by: SURGERY

## 2017-06-28 PROCEDURE — 77030002924 HC SUT GORTX WLGO -B: Performed by: SURGERY

## 2017-06-28 PROCEDURE — 77030012407 HC DRN WND BARD -B: Performed by: SURGERY

## 2017-06-28 PROCEDURE — 77030008771 HC TU NG SALEM SUMP -A: Performed by: ANESTHESIOLOGY

## 2017-06-28 PROCEDURE — 77010033678 HC OXYGEN DAILY

## 2017-06-28 PROCEDURE — 77030013567 HC DRN WND RESERV BARD -A: Performed by: SURGERY

## 2017-06-28 PROCEDURE — C1887 CATHETER, GUIDING: HCPCS | Performed by: SURGERY

## 2017-06-28 PROCEDURE — 77030010939 HC CLP LIG TELE -B: Performed by: SURGERY

## 2017-06-28 PROCEDURE — 80047 BASIC METABLC PNL IONIZED CA: CPT

## 2017-06-28 PROCEDURE — 74011000250 HC RX REV CODE- 250

## 2017-06-28 PROCEDURE — 77030020256 HC SOL INJ NACL 0.9%  500ML: Performed by: SURGERY

## 2017-06-28 PROCEDURE — 76210000016 HC OR PH I REC 1 TO 1.5 HR: Performed by: SURGERY

## 2017-06-28 PROCEDURE — 77030013533 HC SEAL FBRN TISSL RDYTUSE 10ML BAXT -E: Performed by: SURGERY

## 2017-06-28 PROCEDURE — 83735 ASSAY OF MAGNESIUM: CPT | Performed by: SURGERY

## 2017-06-28 PROCEDURE — 74011000258 HC RX REV CODE- 258

## 2017-06-28 PROCEDURE — C1760 CLOSURE DEV, VASC: HCPCS | Performed by: SURGERY

## 2017-06-28 PROCEDURE — 87205 SMEAR GRAM STAIN: CPT | Performed by: NURSE PRACTITIONER

## 2017-06-28 PROCEDURE — 74011250637 HC RX REV CODE- 250/637: Performed by: NURSE PRACTITIONER

## 2017-06-28 PROCEDURE — C1894 INTRO/SHEATH, NON-LASER: HCPCS | Performed by: SURGERY

## 2017-06-28 PROCEDURE — 77030031139 HC SUT VCRL2 J&J -A: Performed by: SURGERY

## 2017-06-28 PROCEDURE — 77030002933 HC SUT MCRYL J&J -A: Performed by: SURGERY

## 2017-06-28 PROCEDURE — 74011250636 HC RX REV CODE- 250/636: Performed by: PHYSICAL MEDICINE & REHABILITATION

## 2017-06-28 PROCEDURE — 77030002916 HC SUT ETHLN J&J -A: Performed by: SURGERY

## 2017-06-28 PROCEDURE — 3331090001 HH PPS REVENUE CREDIT

## 2017-06-28 PROCEDURE — C1892 INTRO/SHEATH,FIXED,PEEL-AWAY: HCPCS | Performed by: SURGERY

## 2017-06-28 PROCEDURE — 74011636320 HC RX REV CODE- 636/320: Performed by: SURGERY

## 2017-06-28 PROCEDURE — 85025 COMPLETE CBC W/AUTO DIFF WBC: CPT | Performed by: SURGERY

## 2017-06-28 DEVICE — SEALANT FIBRIN 10 CC FRZN PRE FILLED SYR TISSEEL: Type: IMPLANTABLE DEVICE | Site: ARM | Status: FUNCTIONAL

## 2017-06-28 DEVICE — IMPLANTABLE DEVICE: Type: IMPLANTABLE DEVICE | Site: ARM | Status: FUNCTIONAL

## 2017-06-28 RX ORDER — FENTANYL CITRATE 50 UG/ML
25 INJECTION, SOLUTION INTRAMUSCULAR; INTRAVENOUS
Status: CANCELLED | OUTPATIENT
Start: 2017-06-28

## 2017-06-28 RX ORDER — METOCLOPRAMIDE HYDROCHLORIDE 5 MG/5ML
10 SOLUTION ORAL
Status: DISCONTINUED | OUTPATIENT
Start: 2017-06-28 | End: 2017-06-29

## 2017-06-28 RX ORDER — FENTANYL CITRATE 50 UG/ML
INJECTION, SOLUTION INTRAMUSCULAR; INTRAVENOUS AS NEEDED
Status: DISCONTINUED | OUTPATIENT
Start: 2017-06-28 | End: 2017-06-29 | Stop reason: HOSPADM

## 2017-06-28 RX ORDER — MORPHINE SULFATE 10 MG/ML
INJECTION, SOLUTION INTRAMUSCULAR; INTRAVENOUS AS NEEDED
Status: DISCONTINUED | OUTPATIENT
Start: 2017-06-28 | End: 2017-06-29 | Stop reason: HOSPADM

## 2017-06-28 RX ORDER — POTASSIUM CHLORIDE 7.45 MG/ML
10 INJECTION INTRAVENOUS
Status: DISPENSED | OUTPATIENT
Start: 2017-06-28 | End: 2017-06-28

## 2017-06-28 RX ORDER — VANCOMYCIN 2 GRAM/500 ML IN 0.9 % SODIUM CHLORIDE INTRAVENOUS
2000 EVERY 24 HOURS
Status: DISCONTINUED | OUTPATIENT
Start: 2017-06-28 | End: 2017-06-29 | Stop reason: DRUGHIGH

## 2017-06-28 RX ORDER — ONDANSETRON 2 MG/ML
4 INJECTION INTRAMUSCULAR; INTRAVENOUS AS NEEDED
Status: CANCELLED | OUTPATIENT
Start: 2017-06-28

## 2017-06-28 RX ORDER — SODIUM CHLORIDE, SODIUM LACTATE, POTASSIUM CHLORIDE, CALCIUM CHLORIDE 600; 310; 30; 20 MG/100ML; MG/100ML; MG/100ML; MG/100ML
25 INJECTION, SOLUTION INTRAVENOUS CONTINUOUS
Status: DISCONTINUED | OUTPATIENT
Start: 2017-06-28 | End: 2017-06-29

## 2017-06-28 RX ORDER — ACETAMINOPHEN 325 MG/1
650 TABLET ORAL ONCE
Status: DISCONTINUED | OUTPATIENT
Start: 2017-06-28 | End: 2017-06-29 | Stop reason: HOSPADM

## 2017-06-28 RX ORDER — SODIUM CHLORIDE 0.9 % (FLUSH) 0.9 %
5-10 SYRINGE (ML) INJECTION AS NEEDED
Status: CANCELLED | OUTPATIENT
Start: 2017-06-28

## 2017-06-28 RX ORDER — MIDAZOLAM HYDROCHLORIDE 1 MG/ML
1 INJECTION, SOLUTION INTRAMUSCULAR; INTRAVENOUS AS NEEDED
Status: DISCONTINUED | OUTPATIENT
Start: 2017-06-28 | End: 2017-06-29 | Stop reason: HOSPADM

## 2017-06-28 RX ORDER — SODIUM CHLORIDE 0.9 % (FLUSH) 0.9 %
5-10 SYRINGE (ML) INJECTION EVERY 8 HOURS
Status: DISCONTINUED | OUTPATIENT
Start: 2017-06-28 | End: 2017-08-24 | Stop reason: HOSPADM

## 2017-06-28 RX ORDER — FENTANYL CITRATE 50 UG/ML
50 INJECTION, SOLUTION INTRAMUSCULAR; INTRAVENOUS AS NEEDED
Status: DISCONTINUED | OUTPATIENT
Start: 2017-06-28 | End: 2017-06-29 | Stop reason: HOSPADM

## 2017-06-28 RX ORDER — HYDROCORTISONE SODIUM SUCCINATE 100 MG/2ML
INJECTION, POWDER, FOR SOLUTION INTRAMUSCULAR; INTRAVENOUS AS NEEDED
Status: DISCONTINUED | OUTPATIENT
Start: 2017-06-28 | End: 2017-06-29 | Stop reason: HOSPADM

## 2017-06-28 RX ORDER — SUCCINYLCHOLINE CHLORIDE 20 MG/ML
INJECTION INTRAMUSCULAR; INTRAVENOUS AS NEEDED
Status: DISCONTINUED | OUTPATIENT
Start: 2017-06-28 | End: 2017-06-29 | Stop reason: HOSPADM

## 2017-06-28 RX ORDER — SODIUM CHLORIDE 9 MG/ML
INJECTION, SOLUTION INTRAVENOUS
Status: DISCONTINUED | OUTPATIENT
Start: 2017-06-28 | End: 2017-06-29 | Stop reason: HOSPADM

## 2017-06-28 RX ORDER — BUDESONIDE 3 MG/1
9 CAPSULE, COATED PELLETS ORAL
Status: DISCONTINUED | OUTPATIENT
Start: 2017-06-28 | End: 2017-06-29

## 2017-06-28 RX ORDER — VANCOMYCIN 2 GRAM/500 ML IN 0.9 % SODIUM CHLORIDE INTRAVENOUS
AS NEEDED
Status: DISCONTINUED | OUTPATIENT
Start: 2017-06-28 | End: 2017-06-29 | Stop reason: HOSPADM

## 2017-06-28 RX ORDER — HYDROMORPHONE HYDROCHLORIDE 1 MG/ML
INJECTION, SOLUTION INTRAMUSCULAR; INTRAVENOUS; SUBCUTANEOUS AS NEEDED
Status: DISCONTINUED | OUTPATIENT
Start: 2017-06-28 | End: 2017-06-29 | Stop reason: HOSPADM

## 2017-06-28 RX ORDER — HEPARIN SODIUM 1000 [USP'U]/ML
INJECTION, SOLUTION INTRAVENOUS; SUBCUTANEOUS AS NEEDED
Status: DISCONTINUED | OUTPATIENT
Start: 2017-06-28 | End: 2017-06-29 | Stop reason: HOSPADM

## 2017-06-28 RX ORDER — ALBUMIN HUMAN 50 G/1000ML
SOLUTION INTRAVENOUS AS NEEDED
Status: DISCONTINUED | OUTPATIENT
Start: 2017-06-28 | End: 2017-06-29 | Stop reason: HOSPADM

## 2017-06-28 RX ORDER — SODIUM CHLORIDE 0.9 % (FLUSH) 0.9 %
5-10 SYRINGE (ML) INJECTION AS NEEDED
Status: DISCONTINUED | OUTPATIENT
Start: 2017-06-28 | End: 2017-08-24 | Stop reason: HOSPADM

## 2017-06-28 RX ORDER — OXYCODONE HYDROCHLORIDE 5 MG/1
5 TABLET ORAL AS NEEDED
Status: CANCELLED | OUTPATIENT
Start: 2017-06-28

## 2017-06-28 RX ORDER — SODIUM CHLORIDE 9 MG/ML
25 INJECTION, SOLUTION INTRAVENOUS CONTINUOUS
Status: DISCONTINUED | OUTPATIENT
Start: 2017-06-28 | End: 2017-06-29 | Stop reason: HOSPADM

## 2017-06-28 RX ORDER — HYDROMORPHONE HYDROCHLORIDE 1 MG/ML
0.2 INJECTION, SOLUTION INTRAMUSCULAR; INTRAVENOUS; SUBCUTANEOUS
Status: CANCELLED | OUTPATIENT
Start: 2017-06-28

## 2017-06-28 RX ORDER — PROPOFOL 10 MG/ML
5-50 VIAL (ML) INTRAVENOUS
Status: DISCONTINUED | OUTPATIENT
Start: 2017-06-29 | End: 2017-07-01

## 2017-06-28 RX ORDER — DIPHENHYDRAMINE HYDROCHLORIDE 50 MG/ML
12.5 INJECTION, SOLUTION INTRAMUSCULAR; INTRAVENOUS AS NEEDED
Status: CANCELLED | OUTPATIENT
Start: 2017-06-28 | End: 2017-06-28

## 2017-06-28 RX ORDER — PROPOFOL 10 MG/ML
INJECTION, EMULSION INTRAVENOUS AS NEEDED
Status: DISCONTINUED | OUTPATIENT
Start: 2017-06-28 | End: 2017-06-29 | Stop reason: HOSPADM

## 2017-06-28 RX ORDER — LIDOCAINE HYDROCHLORIDE 10 MG/ML
0.1 INJECTION, SOLUTION EPIDURAL; INFILTRATION; INTRACAUDAL; PERINEURAL AS NEEDED
Status: DISCONTINUED | OUTPATIENT
Start: 2017-06-28 | End: 2017-06-29 | Stop reason: HOSPADM

## 2017-06-28 RX ORDER — MAGNESIUM SULFATE HEPTAHYDRATE 40 MG/ML
2 INJECTION, SOLUTION INTRAVENOUS ONCE
Status: COMPLETED | OUTPATIENT
Start: 2017-06-28 | End: 2017-06-29

## 2017-06-28 RX ORDER — MIDAZOLAM HYDROCHLORIDE 1 MG/ML
0.5 INJECTION, SOLUTION INTRAMUSCULAR; INTRAVENOUS
Status: CANCELLED | OUTPATIENT
Start: 2017-06-28

## 2017-06-28 RX ORDER — ONDANSETRON 2 MG/ML
8 INJECTION INTRAMUSCULAR; INTRAVENOUS
Status: DISCONTINUED | OUTPATIENT
Start: 2017-06-28 | End: 2017-08-24 | Stop reason: HOSPADM

## 2017-06-28 RX ORDER — ROCURONIUM BROMIDE 10 MG/ML
INJECTION, SOLUTION INTRAVENOUS AS NEEDED
Status: DISCONTINUED | OUTPATIENT
Start: 2017-06-28 | End: 2017-06-29 | Stop reason: HOSPADM

## 2017-06-28 RX ORDER — MIDAZOLAM HYDROCHLORIDE 1 MG/ML
INJECTION, SOLUTION INTRAMUSCULAR; INTRAVENOUS AS NEEDED
Status: DISCONTINUED | OUTPATIENT
Start: 2017-06-28 | End: 2017-06-29 | Stop reason: HOSPADM

## 2017-06-28 RX ORDER — DEXMEDETOMIDINE HYDROCHLORIDE 4 UG/ML
INJECTION, SOLUTION INTRAVENOUS AS NEEDED
Status: DISCONTINUED | OUTPATIENT
Start: 2017-06-28 | End: 2017-06-29 | Stop reason: HOSPADM

## 2017-06-28 RX ORDER — SODIUM CHLORIDE, SODIUM LACTATE, POTASSIUM CHLORIDE, CALCIUM CHLORIDE 600; 310; 30; 20 MG/100ML; MG/100ML; MG/100ML; MG/100ML
125 INJECTION, SOLUTION INTRAVENOUS CONTINUOUS
Status: CANCELLED | OUTPATIENT
Start: 2017-06-28

## 2017-06-28 RX ORDER — MORPHINE SULFATE 10 MG/ML
2 INJECTION, SOLUTION INTRAMUSCULAR; INTRAVENOUS
Status: CANCELLED | OUTPATIENT
Start: 2017-06-28

## 2017-06-28 RX ORDER — SODIUM CHLORIDE 0.9 % (FLUSH) 0.9 %
10 SYRINGE (ML) INJECTION
Status: COMPLETED | OUTPATIENT
Start: 2017-06-28 | End: 2017-06-28

## 2017-06-28 RX ORDER — SODIUM CHLORIDE, SODIUM LACTATE, POTASSIUM CHLORIDE, CALCIUM CHLORIDE 600; 310; 30; 20 MG/100ML; MG/100ML; MG/100ML; MG/100ML
INJECTION, SOLUTION INTRAVENOUS
Status: DISCONTINUED | OUTPATIENT
Start: 2017-06-28 | End: 2017-06-29 | Stop reason: HOSPADM

## 2017-06-28 RX ADMIN — HYDROCORTISONE SODIUM SUCCINATE 100 MG: 100 INJECTION, POWDER, FOR SOLUTION INTRAMUSCULAR; INTRAVENOUS at 20:06

## 2017-06-28 RX ADMIN — ONDANSETRON 8 MG: 2 INJECTION INTRAMUSCULAR; INTRAVENOUS at 11:08

## 2017-06-28 RX ADMIN — Medication 10 ML: at 14:52

## 2017-06-28 RX ADMIN — SODIUM CHLORIDE: 9 INJECTION, SOLUTION INTRAVENOUS at 18:44

## 2017-06-28 RX ADMIN — HYDROMORPHONE HYDROCHLORIDE 0.5 MG: 1 INJECTION, SOLUTION INTRAMUSCULAR; INTRAVENOUS; SUBCUTANEOUS at 21:01

## 2017-06-28 RX ADMIN — PROCHLORPERAZINE EDISYLATE 5 MG: 5 INJECTION INTRAMUSCULAR; INTRAVENOUS at 05:13

## 2017-06-28 RX ADMIN — POTASSIUM CHLORIDE 10 MEQ: 10 INJECTION, SOLUTION INTRAVENOUS at 13:46

## 2017-06-28 RX ADMIN — SODIUM CHLORIDE 100 ML: 900 INJECTION, SOLUTION INTRAVENOUS at 14:52

## 2017-06-28 RX ADMIN — PIPERACILLIN SODIUM,TAZOBACTAM SODIUM 3.38 G: 3; .375 INJECTION, POWDER, FOR SOLUTION INTRAVENOUS at 05:12

## 2017-06-28 RX ADMIN — ALBUMIN HUMAN 250 ML: 50 SOLUTION INTRAVENOUS at 20:40

## 2017-06-28 RX ADMIN — METOCLOPRAMIDE HYDROCHLORIDE 5 MG: 5 SOLUTION ORAL at 07:07

## 2017-06-28 RX ADMIN — HYDROMORPHONE HYDROCHLORIDE 1.5 MG: 1 INJECTION, SOLUTION INTRAMUSCULAR; INTRAVENOUS; SUBCUTANEOUS at 15:26

## 2017-06-28 RX ADMIN — METOCLOPRAMIDE HYDROCHLORIDE 10 MG: 5 SOLUTION ORAL at 16:40

## 2017-06-28 RX ADMIN — ROCURONIUM BROMIDE 50 MG: 10 INJECTION, SOLUTION INTRAVENOUS at 23:17

## 2017-06-28 RX ADMIN — Medication: at 17:31

## 2017-06-28 RX ADMIN — HYDROMORPHONE HYDROCHLORIDE 0.5 MG: 1 INJECTION, SOLUTION INTRAMUSCULAR; INTRAVENOUS; SUBCUTANEOUS at 20:30

## 2017-06-28 RX ADMIN — ALBUMIN HUMAN 250 ML: 50 SOLUTION INTRAVENOUS at 19:58

## 2017-06-28 RX ADMIN — ALPRAZOLAM 1 MG: 1 TABLET ORAL at 05:13

## 2017-06-28 RX ADMIN — Medication: at 09:13

## 2017-06-28 RX ADMIN — HYDROMORPHONE HYDROCHLORIDE 1.5 MG: 1 INJECTION, SOLUTION INTRAMUSCULAR; INTRAVENOUS; SUBCUTANEOUS at 09:46

## 2017-06-28 RX ADMIN — POTASSIUM CHLORIDE 10 MEQ: 10 INJECTION, SOLUTION INTRAVENOUS at 16:40

## 2017-06-28 RX ADMIN — DEXMEDETOMIDINE HYDROCHLORIDE 10 MCG: 4 INJECTION, SOLUTION INTRAVENOUS at 19:33

## 2017-06-28 RX ADMIN — FENTANYL CITRATE 150 MCG: 50 INJECTION, SOLUTION INTRAMUSCULAR; INTRAVENOUS at 18:50

## 2017-06-28 RX ADMIN — POTASSIUM CHLORIDE 10 MEQ: 10 INJECTION, SOLUTION INTRAVENOUS at 15:17

## 2017-06-28 RX ADMIN — ROCURONIUM BROMIDE 10 MG: 10 INJECTION, SOLUTION INTRAVENOUS at 20:03

## 2017-06-28 RX ADMIN — ONDANSETRON 8 MG: 2 INJECTION INTRAMUSCULAR; INTRAVENOUS at 16:38

## 2017-06-28 RX ADMIN — DEXMEDETOMIDINE HYDROCHLORIDE 10 MCG: 4 INJECTION, SOLUTION INTRAVENOUS at 20:02

## 2017-06-28 RX ADMIN — PROCHLORPERAZINE EDISYLATE 5 MG: 5 INJECTION INTRAMUSCULAR; INTRAVENOUS at 17:20

## 2017-06-28 RX ADMIN — MIDAZOLAM HYDROCHLORIDE 2 MG: 1 INJECTION, SOLUTION INTRAMUSCULAR; INTRAVENOUS at 18:25

## 2017-06-28 RX ADMIN — PIPERACILLIN SODIUM,TAZOBACTAM SODIUM 3.38 G: 3; .375 INJECTION, POWDER, FOR SOLUTION INTRAVENOUS at 13:46

## 2017-06-28 RX ADMIN — IOPAMIDOL 100 ML: 755 INJECTION, SOLUTION INTRAVENOUS at 12:00

## 2017-06-28 RX ADMIN — PANTOPRAZOLE SODIUM 40 MG: 40 TABLET, DELAYED RELEASE ORAL at 07:07

## 2017-06-28 RX ADMIN — ROCURONIUM BROMIDE 10 MG: 10 INJECTION, SOLUTION INTRAVENOUS at 19:27

## 2017-06-28 RX ADMIN — SODIUM CHLORIDE, SODIUM LACTATE, POTASSIUM CHLORIDE, CALCIUM CHLORIDE: 600; 310; 30; 20 INJECTION, SOLUTION INTRAVENOUS at 22:35

## 2017-06-28 RX ADMIN — ROCURONIUM BROMIDE 5 MG: 10 INJECTION, SOLUTION INTRAVENOUS at 18:50

## 2017-06-28 RX ADMIN — HYDROMORPHONE HYDROCHLORIDE 0.5 MG: 1 INJECTION, SOLUTION INTRAMUSCULAR; INTRAVENOUS; SUBCUTANEOUS at 20:50

## 2017-06-28 RX ADMIN — HEPARIN SODIUM 5000 UNITS: 1000 INJECTION, SOLUTION INTRAVENOUS; SUBCUTANEOUS at 22:41

## 2017-06-28 RX ADMIN — PROPOFOL 200 MG: 10 INJECTION, EMULSION INTRAVENOUS at 18:50

## 2017-06-28 RX ADMIN — Medication 10 ML: at 06:00

## 2017-06-28 RX ADMIN — MORPHINE SULFATE 60 MG: 30 TABLET, EXTENDED RELEASE ORAL at 07:07

## 2017-06-28 RX ADMIN — DEXMEDETOMIDINE HYDROCHLORIDE 10 MCG: 4 INJECTION, SOLUTION INTRAVENOUS at 19:45

## 2017-06-28 RX ADMIN — ROCURONIUM BROMIDE 25 MG: 10 INJECTION, SOLUTION INTRAVENOUS at 19:00

## 2017-06-28 RX ADMIN — METOCLOPRAMIDE HYDROCHLORIDE 10 MG: 5 SOLUTION ORAL at 11:08

## 2017-06-28 RX ADMIN — FENTANYL CITRATE 100 MCG: 50 INJECTION, SOLUTION INTRAMUSCULAR; INTRAVENOUS at 18:25

## 2017-06-28 RX ADMIN — ONDANSETRON 8 MG: 4 TABLET, ORALLY DISINTEGRATING ORAL at 03:06

## 2017-06-28 RX ADMIN — SODIUM CHLORIDE, SODIUM LACTATE, POTASSIUM CHLORIDE, CALCIUM CHLORIDE: 600; 310; 30; 20 INJECTION, SOLUTION INTRAVENOUS at 18:15

## 2017-06-28 RX ADMIN — BUDESONIDE 9 MG: 3 CAPSULE ORAL at 11:08

## 2017-06-28 RX ADMIN — SUCCINYLCHOLINE CHLORIDE 180 MG: 20 INJECTION INTRAMUSCULAR; INTRAVENOUS at 18:50

## 2017-06-28 RX ADMIN — HYDROMORPHONE HYDROCHLORIDE 0.5 MG: 1 INJECTION, SOLUTION INTRAMUSCULAR; INTRAVENOUS; SUBCUTANEOUS at 20:27

## 2017-06-28 RX ADMIN — HYDROMORPHONE HYDROCHLORIDE 1 MG: 1 INJECTION, SOLUTION INTRAMUSCULAR; INTRAVENOUS; SUBCUTANEOUS at 22:35

## 2017-06-28 RX ADMIN — IOHEXOL 50 ML: 240 INJECTION, SOLUTION INTRATHECAL; INTRAVASCULAR; INTRAVENOUS; ORAL at 14:52

## 2017-06-28 RX ADMIN — MORPHINE SULFATE 10 MG: 10 INJECTION, SOLUTION INTRAMUSCULAR; INTRAVENOUS at 23:56

## 2017-06-28 RX ADMIN — PROCHLORPERAZINE EDISYLATE 5 MG: 5 INJECTION INTRAMUSCULAR; INTRAVENOUS at 13:41

## 2017-06-28 RX ADMIN — MORPHINE SULFATE 60 MG: 30 TABLET, EXTENDED RELEASE ORAL at 13:44

## 2017-06-28 RX ADMIN — MAGNESIUM SULFATE HEPTAHYDRATE 2 G: 40 INJECTION, SOLUTION INTRAVENOUS at 17:34

## 2017-06-28 RX ADMIN — VANCOMYCIN 2 GRAM/500 ML IN 0.9 % SODIUM CHLORIDE INTRAVENOUS 2 G: at 19:15

## 2017-06-28 RX ADMIN — ALBUMIN HUMAN 250 ML: 50 SOLUTION INTRAVENOUS at 19:38

## 2017-06-28 RX ADMIN — ROCURONIUM BROMIDE 50 MG: 10 INJECTION, SOLUTION INTRAVENOUS at 21:32

## 2017-06-28 RX ADMIN — ALPRAZOLAM 1 MG: 1 TABLET ORAL at 17:32

## 2017-06-28 NOTE — ANESTHESIA PREPROCEDURE EVALUATION
Anesthetic History   No history of anesthetic complications            Review of Systems / Medical History  Patient summary reviewed, nursing notes reviewed and pertinent labs reviewed    Pulmonary  Within defined limits                 Neuro/Psych   Within defined limits           Cardiovascular  Within defined limits                     GI/Hepatic/Renal  Within defined limits              Endo/Other      Hypothyroidism  Morbid obesity     Other Findings   Comments: DVT           Physical Exam    Airway  Mallampati: II  TM Distance: > 6 cm  Neck ROM: normal range of motion   Mouth opening: Normal     Cardiovascular  Regular rate and rhythm,  S1 and S2 normal,  no murmur, click, rub, or gallop             Dental  No notable dental hx       Pulmonary  Breath sounds clear to auscultation               Abdominal  GI exam deferred       Other Findings            Anesthetic Plan    ASA: 3, emergent  Anesthesia type: general          Induction: Intravenous  Anesthetic plan and risks discussed with: Patient

## 2017-06-28 NOTE — CONSULTS
Infectious Disease Consult    Today's Date: 6/28/2017   Admit Date: 6/25/2017    Impression:   · Extensive GI history  · Crohn disease  · Colectomy  · Bowel resection/revisions  · Ex lap 6/7  · Acute abdominal pain DOA  · Leukocytosis     Plan:   · Add antifungal  · Follow up CT scan   · Work up fevers    Anti-infectives:     Inpat Anti-Infectives     Start     Ordered Stop    06/28/17 2000  vancomycin (VANCOCIN) 2000 mg in  ml infusion  2,000 mg,   IntraVENous,   EVERY 24 HOURS      06/28/17 1024 --    06/25/17 2300  piperacillin-tazobactam (ZOSYN) 3.375 g in 0.9% sodium chloride (MBP/ADV) 100 mL  3.375 g,   IntraVENous,   EVERY 8 HOURS      06/25/17 2224 --    06/25/17 2229  Vancomycin ---Pharmacy to Dose  Other,   RX DOSING/MONITORING      06/25/17 2230 --          Subjective:   Date of Consultation:  June 28, 2017  Referring Physician: Dr Katrina Juan    Patient is a 46 y.o. female admitted with abdominal pain and subjective fevers. She has an extensive GI/abdominal surgical history as outlined. She is admitted with acute abdominal pain and elevated WBC count. She is on broad antibiotic coverage and CT scan is pending.      Patient Active Problem List   Diagnosis Code    Crohn's disease ([facility] K50.90    DVT (deep venous thrombosis) ([facility]) I82.409    Incarcerated ventral hernia K46.0    Right flank pain R10.9    Perforated bowel ([address] K63.1    Abdominal pain R10.9     Past Medical History:   Diagnosis Date    Crohn's disease ([address] 8/15/2011    DVT (deep venous thrombosis) ([facility] 8/15/2011    Incarcerated ventral hernia 8/15/2011    Right flank pain 8/22/2011    Seizures ([facility]     Stroke [facility])       Family History   Problem Relation Age of Onset    Hypertension Father     Stroke Father     Other Father      arthritis      Social History   Substance Use Topics    Smoking status: Former Smoker    Smokeless tobacco: Not on file    Alcohol use No     Past Surgical History:   Procedure Laterality Date  HX OTHER SURGICAL      multiple procedures related to her Crohn's disease    [facility] LAP, INCISIONAL HERNIA REPAIR,INCARCERATED  9-10-08    dr. Nupur Wetzel      Prior to Admission medications    Medication Sig Start Date End Date Taking? Authorizing Provider   sucralfate (CARAFATE) 100 mg/mL suspension Take 1 g by mouth four (4) times daily. 6/23/17  Yes Yoselyn Mejia MD   docusate sodium (COLACE) 100 mg capsule Take 100 mg by mouth daily. 6/22/17  Yes Lachelle Kenyon MD   lactobacillus comb no.10 (PROBIOTIC) 20 billion cell cap Take 1 Cap by mouth two (2) times a day. 6/22/17  Yes Lachelle Kenyon MD   HYDROmorphone (DILAUDID) 2 mg tablet Take 1 Tab by mouth every four (4) hours as needed. Max Daily Amount: 12 mg. 6/21/17  Yes Lachelle Kenyon MD   clindamycin (CLEOCIN) 300 mg capsule Take 1 Cap by mouth three (3) times daily for 7 days. 6/21/17 6/28/17 Yes Theodora Urban MD   cyanocobalamin, vitamin B-12, 5,000 mcg TbDL Take 5,000 mcg by mouth Every Friday. Yes Historical Provider   promethazine (PHENERGAN) 25 mg tablet Take 25 mg by mouth every six (6) hours as needed for Nausea. Tries to alternate with ondansetron   Yes Historical Provider   albuterol sulfate SR (PROVENTIL SR) 4 mg ER tablet Take 4 mg by mouth daily as needed for Other (Wheezing). Tries to alternate with albuterol MDI when she doesn't need rapid resolution of symptoms   Yes Historical Provider   fluticasone-vilanterol (BREO ELLIPTA) 200-25 mcg/dose inhaler Take 1 Puff by inhalation daily. Yes Historical Provider   norgestrel-ethinyl estradiol (CRYSELLE, 28,) 0.3-30 mg-mcg tab Take 1 Tab by mouth daily. Yes Historical Provider   esomeprazole (NEXIUM) 20 mg capsule Take 20 mg by mouth daily. Yes Historical Provider   ondansetron (ZOFRAN ODT) 8 mg disintegrating tablet Take 8 mg by mouth every eight (8) hours as needed for Nausea.  Tries to alternate with promethazine   Yes Historical Provider   methylnaltrexone (RELISTOR) 150 mg tab tablet Take 150 mg by mouth Daily (before breakfast). Yes Historical Provider   predniSONE (DELTASONE) 20 mg tablet Take 30 mg by mouth daily. Takes 1.5 of the 20 mg tab   Yes Historical Provider   morphine CR (MS CONTIN) 60 mg CR tablet Take 60 mg by mouth three (3) times daily. Yes Historical Provider   morphine IR (MS IR) 30 mg tablet Take 30 mg by mouth every four (4) hours as needed for Pain. Yes Historical Provider   albuterol (PROVENTIL HFA, VENTOLIN HFA, PROAIR HFA) 90 mcg/actuation inhaler Take 2 Puffs by inhalation every four (4) hours as needed for Wheezing. Yes Historical Provider   ALPRAZOLAM (XANAX PO) Take 1 mg by mouth three (3) times daily as needed. Yes Historical Provider   INFLIXIMAB (REMICADE IV) by IntraVENous route every six (6) weeks. Yes Historical Provider       Allergies   Allergen Reactions    Demerol [Meperidine] Unknown (comments)    Soma [Carisoprodol] Rash and Nausea Only    Sulfa (Sulfonamide Antibiotics) Unknown (comments)    Toradol [Ketorolac Tromethamine] Unknown (comments)        Review of Systems:  Pertinent items are noted in the History of Present Illness.     Objective:     Visit Vitals    BP (!) 140/100 (BP 1 Location: Right arm)    Pulse (!) 115    Temp 99.3 °F (37.4 °C)    Resp 14    Wt 143.4 kg (316 lb 1.6 oz)    SpO2 95%    Breastfeeding No    BMI 54.26 kg/m2     Temp (24hrs), Av.3 °F (37.4 °C), Min:98.7 °F (37.1 °C), Max:99.9 °F (37.7 °C)       Lines:  Central Venous Catheter:            Physical Exam:  General:  alert, cooperative, no distress, appears stated age  Lungs:  clear to auscultation bilaterally  Heart:  regular rate and rhythm  Abdomen:  abnormal findings:  obese, tenderness moderate in the entire abdomen  Skin:  no rash or abnormalities    Data Review:     CBC:  Recent Labs      17   0519  17   0613  17   0400   WBC  24.8*  25.6*  27.6*   GRANS  75  69  73   MONOS  10  5  5   EOS  1  0  0   ANEU  18.9*  18.9*  22.4*   ABL  3.0  5.4*  3.6* HGB  11.3*  12.3  12.2   HCT  35.3  37.7  37.1   PLT  251  237  264       BMP:  Recent Labs      06/28/17   0519  06/27/17   1445  06/26/17   0400   CREA  1.08*  0.89  0.87   BUN  4*  6  9   NA  142  136  134*   K  3.0*  3.1*  3.2*   CL  103  101  100   CO2  29  26  22   AGAP  10  9  12   GLU  101*  104*  125*       LFTS:  Recent Labs      06/28/17 0519   TBILI  0.5   ALT  28   SGOT  17   AP  80   TP  6.6   ALB  2.0*       Microbiology:     All Micro Results     Procedure Component Value Units Date/Time    CULTURE, Ray Mercado STAIN [[id number]] Collected:  06/28/17 1217    Order Status:  Completed Specimen:  Incision Updated:  06/28/17 1330     Special Requests: NO SPECIAL REQUESTS        GRAM STAIN OCCASIONAL  WBCS SEEN         NO ORGANISMS SEEN        Culture result: PENDING    CULTURE, BLOOD, PAIRED [[id number]] Collected:  06/27/17 1435    Order Status:  Completed Specimen:  Blood Updated:  06/28/17 0522     Special Requests: NO SPECIAL REQUESTS        Culture result: NO GROWTH AFTER 13 HOURS       C. DIFFICILE (DNA) [[id number]] Collected:  06/27/17 1435    Order Status:  Completed Specimen:  Stool Updated:  06/27/17 1957     C. difficile (DNA) NEGATIVE          This specimen is negative for toxigenic C difficile by DNA amplification. Repeat testing is not recommended for confirmation, samples received within 7 days of this negative result will be rejected.              Imaging:   Pending    Signed By: Christen Ayers MD     June 28, 2017

## 2017-06-28 NOTE — PROGRESS NOTES
General Surgery Daily Progress Note    Admit Date: 2017  Post-Operative Day: 1 Day Post-Op from Procedure(s): INFUSION CATHETER INSERTION     Subjective:     Last 24 hrs: Pt cont to c/o deep, mid, abd pain that PCA isn't controlling. Also nauseated. Has central line now. Reports ileostomy is working. Objective:     Blood pressure (!) 144/100, pulse (!) 108, temperature 98.7 °F (37.1 °C), resp. rate 16, weight 316 lb 1.6 oz (143.4 kg), last menstrual period 2017, SpO2 96 %, not currently breastfeeding.   Temp (24hrs), Av.3 °F (37.4 °C), Min:98.7 °F (37.1 °C), Max:99.9 °F (37.7 °C)      _____________________  Physical Exam:     Alert and Oriented, x3, appears to be in pain  Cardiovascular: RRR, no peripheral edema  Abdomen: soft, +BS, TT deep palp around umbilicus; wound w/ lg amt purulent drainage - cultured      Assessment:   Active Problems:    Abdominal pain (2017)            Plan:     CT abd/pelvis today  Wound culture  Cont zosyn and vanco  ID consult  Gi/dvt proph  reglan  Gi following  Replete K  Daily labs    Data Review:    Recent Labs      17   0519  17   0613  17   0400   WBC  24.8*  25.6*  27.6*   HGB  11.3*  12.3  12.2   HCT  35.3  37.7  37.1   PLT  251  237  264     Recent Labs      17   0519  17   0400  17   1445  17   0400  17   1614   NA  142   --   136  134*  138   K  3.0*   --   3.1*  3.2*  4.7   CL  103   --   101  100  102   CO2  29   --     28   GLU  101*   --   104*  125*  114*   BUN  4*   --   6  9  8   CREA  1.08*   --   0.89  0.87  1.22*   CA  8.3*   --   8.2*  8.0*  9.1   MG   --   1.5*   --   1.3*   --    PHOS   --   2.4*   --   3.7   --    ALB  2.0*   --    --    --   2.5*   SGOT  17   --    --    --   17   ALT  28   --    --    --   20     Recent Labs      17   1614   LPSE  67*           ______________________  Medications:    Current Facility-Administered Medications   Medication Dose Route Frequency  metoclopramide (REGLAN) 5 mg/5 mL oral syrup 10 mg  10 mg Oral AC&HS    budesonide (ENTOCORT EC) capsule 9 mg  9 mg Oral 7am    OTHER(NON-FORMULARY) 150 mg  150 mg Oral DAILY    ondansetron (ZOFRAN) injection 8 mg  8 mg IntraVENous Q6H PRN    vancomycin (VANCOCIN) 2000 mg in  ml infusion  2,000 mg IntraVENous Q24H    potassium chloride 10 mEq in 100 ml IVPB  10 mEq IntraVENous Q1H    HYDROmorphone (PF) 15 mg/30 ml (DILAUDID) PCA   IntraVENous CONTINUOUS    prochlorperazine (COMPAZINE) with saline injection 5 mg  5 mg IntraVENous Q6H    HYDROmorphone (PF) (DILAUDID) injection 1.5 mg  1.5 mg IntraVENous Q4H PRN    pantoprazole (PROTONIX) tablet 40 mg  40 mg Oral ACB    lactated Ringers infusion  100 mL/hr IntraVENous CONTINUOUS    sodium chloride (NS) flush 5-10 mL  5-10 mL IntraVENous Q8H    sodium chloride (NS) flush 5-10 mL  5-10 mL IntraVENous PRN    acetaminophen (TYLENOL) tablet 650 mg  650 mg Oral Q4H PRN    naloxone (NARCAN) injection 0.4 mg  0.4 mg IntraVENous PRN    diphenhydrAMINE (BENADRYL) injection 12.5 mg  12.5 mg IntraVENous Q4H PRN    enoxaparin (LOVENOX) injection 40 mg  40 mg SubCUTAneous Q24H    piperacillin-tazobactam (ZOSYN) 3.375 g in 0.9% sodium chloride (MBP/ADV) 100 mL  3.375 g IntraVENous Q8H    morphine CR (MS CONTIN) tablet 60 mg  60 mg Oral TID    ALPRAZolam (XANAX) tablet 1 mg  1 mg Oral BID PRN    albuterol sulfate SR (PROVENTIL SR) tablet 4 mg  4 mg Oral BID PRN    albuterol (PROVENTIL VENTOLIN) nebulizer solution 2.5 mg  2.5 mg Nebulization Q4H PRN    methylnaltrexone (RELISTOR) injection 12 mg  12 mg SubCUTAneous DAILY PRN    Vancomycin ---Pharmacy to Dose   Other Rx Dosing/Monitoring       Antonella Finch NP  6/28/2017        Pt seen and examined  Continues to have nausea  NOted to have a large amount of drainage earlier today  Still having pain    Gen- Alert in NAD  Lungs- CTA  H-mildly tachy  Abd- S/mild tenderness- There is thin fluid coming from the wound. Non foul smelling    Recent Results (from the past 24 hour(s))   C. DIFFICILE (DNA)    Collection Time: 06/27/17  2:35 PM   Result Value Ref Range    C. difficile (DNA) NEGATIVE  NEG     CULTURE, BLOOD, PAIRED    Collection Time: 06/27/17  2:35 PM   Result Value Ref Range    Special Requests: NO SPECIAL REQUESTS      Culture result: NO GROWTH AFTER 13 HOURS     METABOLIC PANEL, BASIC    Collection Time: 06/27/17  2:45 PM   Result Value Ref Range    Sodium 136 136 - 145 mmol/L    Potassium 3.1 (L) 3.5 - 5.1 mmol/L    Chloride 101 97 - 108 mmol/L    CO2 26 21 - 32 mmol/L    Anion gap 9 5 - 15 mmol/L    Glucose 104 (H) 65 - 100 mg/dL    BUN 6 6 - 20 MG/DL    Creatinine 0.89 0.55 - 1.02 MG/DL    BUN/Creatinine ratio 7 (L) 12 - 20      GFR est AA >60 >60 ml/min/1.73m2    GFR est non-AA >60 >60 ml/min/1.73m2    Calcium 8.2 (L) 8.5 - 10.1 MG/DL   MAGNESIUM    Collection Time: 06/28/17  4:00 AM   Result Value Ref Range    Magnesium 1.5 (L) 1.6 - 2.4 mg/dL   PHOSPHORUS    Collection Time: 06/28/17  4:00 AM   Result Value Ref Range    Phosphorus 2.4 (L) 2.6 - 4.7 MG/DL   CBC WITH AUTOMATED DIFF    Collection Time: 06/28/17  5:19 AM   Result Value Ref Range    WBC 24.8 (H) 3.6 - 11.0 K/uL    RBC 3.86 3.80 - 5.20 M/uL    HGB 11.3 (L) 11.5 - 16.0 g/dL    HCT 35.3 35.0 - 47.0 %    MCV 91.5 80.0 - 99.0 FL    MCH 29.3 26.0 - 34.0 PG    MCHC 32.0 30.0 - 36.5 g/dL    RDW 14.8 (H) 11.5 - 14.5 %    PLATELET 351 425 - 437 K/uL    NEUTROPHILS 75 32 - 75 %    BAND NEUTROPHILS 1 0 - 6 %    LYMPHOCYTES 12 12 - 49 %    MONOCYTES 10 5 - 13 %    EOSINOPHILS 1 0 - 7 %    BASOPHILS 1 0 - 1 %    ABS. NEUTROPHILS 18.9 (H) 1.8 - 8.0 K/UL    ABS. LYMPHOCYTES 3.0 0.8 - 3.5 K/UL    ABS. MONOCYTES 2.5 (H) 0.0 - 1.0 K/UL    ABS. EOSINOPHILS 0.2 0.0 - 0.4 K/UL    ABS.  BASOPHILS 0.2 (H) 0.0 - 0.1 K/UL    DF MANUAL      PLATELET COMMENTS LARGE PLATELETS      RBC COMMENTS POLYCHROMASIA  1+        RBC COMMENTS ANISOCYTOSIS  1+       METABOLIC PANEL, COMPREHENSIVE    Collection Time: 06/28/17  5:19 AM   Result Value Ref Range    Sodium 142 136 - 145 mmol/L    Potassium 3.0 (L) 3.5 - 5.1 mmol/L    Chloride 103 97 - 108 mmol/L    CO2 29 21 - 32 mmol/L    Anion gap 10 5 - 15 mmol/L    Glucose 101 (H) 65 - 100 mg/dL    BUN 4 (L) 6 - 20 MG/DL    Creatinine 1.08 (H) 0.55 - 1.02 MG/DL    BUN/Creatinine ratio 4 (L) 12 - 20      GFR est AA >60 >60 ml/min/1.73m2    GFR est non-AA 56 (L) >60 ml/min/1.73m2    Calcium 8.3 (L) 8.5 - 10.1 MG/DL    Bilirubin, total 0.5 0.2 - 1.0 MG/DL    ALT (SGPT) 28 12 - 78 U/L    AST (SGOT) 17 15 - 37 U/L    Alk.  phosphatase 80 45 - 117 U/L    Protein, total 6.6 6.4 - 8.2 g/dL    Albumin 2.0 (L) 3.5 - 5.0 g/dL    Globulin 4.6 (H) 2.0 - 4.0 g/dL    A-G Ratio 0.4 (L) 1.1 - 2.2     VANCOMYCIN, RANDOM    Collection Time: 06/28/17  9:21 AM   Result Value Ref Range    Vancomycin, random 22.9 UG/ML   CULTURE, WOUND W GRAM STAIN    Collection Time: 06/28/17 12:17 PM   Result Value Ref Range    Special Requests: NO SPECIAL REQUESTS      GRAM STAIN OCCASIONAL  WBCS SEEN        GRAM STAIN NO ORGANISMS SEEN      Culture result: PENDING      Wbc 24 K    Plan-  ID consult  Repeat CT scan  Continue abx  Continue full liquids,  Pain control - palliative   Replete k and mg

## 2017-06-28 NOTE — PROGRESS NOTES
Pharmacist Note - Vancomycin Dosing  Therapy day 4  Indication: Post surgical site infection/SSTI.    - S/p Exlap w/lysis of adhesions. Now presenting with leukocytosis, S/Sx of infection, and mesenteric edema  Current regimen: 2000 mg Q 12hr    A Random Level resulted at 22.6 mcg/mL which was obtained 9.25 hrs post-dose, however dosing intervals have been off d/t access issues; CVC now in place. Now with SCr bump which predicts trough above goal range. Goal trough: 10 - 15 mcg/mL     Plan:  Dose held this AM while waiting for Random level to result. Change to 2000 mg Q 24hrs . Pharmacy will continue to monitor this patient daily for changes in clinical status and renal function.

## 2017-06-28 NOTE — PROGRESS NOTES
[address]  [address], [address]       GI PROGRESS NOTE  Crystal Moody [facility]  [phone]    NAME: Valdo Hein   :  1975   MRN:  [id number]       Subjective:     Pain is the main issue. Lot of umbilical abdominal pain. C-diff was negative. Nausea has worsened with increase of pain medications    Objective:     VITALS:   Last 24hrs VS reviewed since prior progress note. Most recent are:  Visit Vitals    BP (!) 144/100 (BP 1 Location: Right arm)    Pulse (!) 108    Temp 98.7 °F (37.1 °C)    Resp 16    Wt 143.4 kg (316 lb 1.6 oz)    SpO2 96%    Breastfeeding No    BMI 54.26 kg/m2       PHYSICAL EXAM:  General: Cooperative, obviously uncomfortable   Neurologic:  Alert and oriented X 3. HEENT: PERRLA, EOMI. Lungs:  CTA Bilaterally. No Wheezing  Heart:  s1 s2, tachy  Abdomen: Soft, Non distended, tender, incision healing,BS hypo  Extremities: No edema  Psych:   Depressed and tearful    Lab Data Reviewed:     Recent Results (from the past 24 hour(s))   C.  DIFFICILE (DNA)    Collection Time: 17  2:35 PM   Result Value Ref Range    C. difficile (DNA) NEGATIVE  NEG     CULTURE, BLOOD, PAIRED    Collection Time: 17  2:35 PM   Result Value Ref Range    Special Requests: NO SPECIAL REQUESTS      Culture result: NO GROWTH AFTER 13 HOURS     METABOLIC PANEL, BASIC    Collection Time: 17  2:45 PM   Result Value Ref Range    Sodium 136 136 - 145 mmol/L    Potassium 3.1 (L) 3.5 - 5.1 mmol/L    Chloride 101 97 - 108 mmol/L    CO2 26 21 - 32 mmol/L    Anion gap 9 5 - 15 mmol/L    Glucose 104 (H) 65 - 100 mg/dL    BUN 6 6 - 20 MG/DL    Creatinine 0.89 0.55 - 1.02 MG/DL    BUN/Creatinine ratio 7 (L) 12 - 20      GFR est AA >60 >60 ml/min/1.73m2    GFR est non-AA >60 >60 ml/min/1.73m2    Calcium 8.2 (L) 8.5 - 10.1 MG/DL   MAGNESIUM    Collection Time: 17  4:00 AM   Result Value Ref Range    Magnesium 1.5 (L) 1.6 - 2.4 mg/dL   PHOSPHORUS Collection Time: 06/28/17  4:00 AM   Result Value Ref Range    Phosphorus 2.4 (L) 2.6 - 4.7 MG/DL   CBC WITH AUTOMATED DIFF    Collection Time: 06/28/17  5:19 AM   Result Value Ref Range    WBC 24.8 (H) 3.6 - 11.0 K/uL    RBC 3.86 3.80 - 5.20 M/uL    HGB 11.3 (L) 11.5 - 16.0 g/dL    HCT 35.3 35.0 - 47.0 %    MCV 91.5 80.0 - 99.0 FL    MCH 29.3 26.0 - 34.0 PG    MCHC 32.0 30.0 - 36.5 g/dL    RDW 14.8 (H) 11.5 - 14.5 %    PLATELET 928 320 - 184 K/uL    NEUTROPHILS 75 32 - 75 %    BAND NEUTROPHILS 1 0 - 6 %    LYMPHOCYTES 12 12 - 49 %    MONOCYTES 10 5 - 13 %    EOSINOPHILS 1 0 - 7 %    BASOPHILS 1 0 - 1 %    ABS. NEUTROPHILS 18.9 (H) 1.8 - 8.0 K/UL    ABS. LYMPHOCYTES 3.0 0.8 - 3.5 K/UL    ABS. MONOCYTES 2.5 (H) 0.0 - 1.0 K/UL    ABS. EOSINOPHILS 0.2 0.0 - 0.4 K/UL    ABS. BASOPHILS 0.2 (H) 0.0 - 0.1 K/UL    DF MANUAL      PLATELET COMMENTS LARGE PLATELETS      RBC COMMENTS POLYCHROMASIA  1+        RBC COMMENTS ANISOCYTOSIS  1+       METABOLIC PANEL, COMPREHENSIVE    Collection Time: 06/28/17  5:19 AM   Result Value Ref Range    Sodium 142 136 - 145 mmol/L    Potassium 3.0 (L) 3.5 - 5.1 mmol/L    Chloride 103 97 - 108 mmol/L    CO2 29 21 - 32 mmol/L    Anion gap 10 5 - 15 mmol/L    Glucose 101 (H) 65 - 100 mg/dL    BUN 4 (L) 6 - 20 MG/DL    Creatinine 1.08 (H) 0.55 - 1.02 MG/DL    BUN/Creatinine ratio 4 (L) 12 - 20      GFR est AA >60 >60 ml/min/1.73m2    GFR est non-AA 56 (L) >60 ml/min/1.73m2    Calcium 8.3 (L) 8.5 - 10.1 MG/DL    Bilirubin, total 0.5 0.2 - 1.0 MG/DL    ALT (SGPT) 28 12 - 78 U/L    AST (SGOT) 17 15 - 37 U/L    Alk. phosphatase 80 45 - 117 U/L    Protein, total 6.6 6.4 - 8.2 g/dL    Albumin 2.0 (L) 3.5 - 5.0 g/dL    Globulin 4.6 (H) 2.0 - 4.0 g/dL    A-G Ratio 0.4 (L) 1.1 - 2.2           ________________________________________________________________________       Assessment:   · Crohn's Disease s/p ex lap with worsening abdominal pain and leukocytosis. Still no explanation for the WBC.   C-diff negative. Started entocort for IBD treatment. Hard to determine what is the source of her pain. Patient Active Problem List   Diagnosis Code    Crohn's disease ([address] K50.90    DVT (deep venous thrombosis) ([address] I82.409    Incarcerated ventral hernia K46.0    Right flank pain R10.9    Perforated bowel ([address] K63.1    Abdominal pain R10.9     Plan:   · Stool calprotectin to help determine if we are dealing with an acute flare  · Increased reglan  · Relistor PO- to take her home medication  · Palliative care for pain control     Signed By: Syed uG NP     6/28/2017  10:09 AM         GI Attending: Patient seen and examined. She is complaining of significant abdominal pain. C diff negative. Budesonide 9 mg daily for Crohn's flare. If this is not helping, then we can consider IV steroids with transition to PO prednisone. If WBC does not improve, would consider repeat imaging (CT enterography vs MR enterography). Will follow. Cain Michel MD

## 2017-06-28 NOTE — PROGRESS NOTES
Palliative Medicine Consult  [place]ath: [phone] ([phone])    Patient Name: Leona Pinto  YOB: 1975    Date of Initial Consult: 6/27/17  Reason for Consult: overwhelming symptoms  Requesting Provider: Veronica Ann NP  Primary Care Physician: Luis A Deluca MD      SUMMARY:   Leona Pinto is a 46 y.o. with a past history of Crohn's disease,anxiety,  chronic pain, hx DVT, multiple (4) small bowel resections, s/p recent urgent dx lap, with lysis of adhesions, repair of suspected perf 6/7/17 , who was admitted on 6/25/2017 from home with a diagnosis of worsening abdominal pain, elevated WBC and MRSA wound infection. Current medical issues leading to Palliative Medicine involvement include: Crohn's flare (seen on enteroscopy), abdominal pain, MRSA wound infection, post op wound pain, nausea. .    Patient is followed chronically for pain management by Dr. Santos Linder at [facility]. Home regimen for chronic abdominal pain is MS Contin 60 Q8 and MS IR 30 Q6 PRN. She also takes Xanax 1mg TID prn anxiety   PALLIATIVE DIAGNOSES:   1. Acute on chronic abdominal pain. Incision (post op) , and also diffuse. 2. Nausea  3. Crohn's disease- just restarted on home budesonide   4. Chronic constipation on home relistor   5. MRSA wound        PLAN:   1. Pain - General abdominal pain, Crohn's flare vs postop pain. 1. Continue home regimen of MS Contin 60mg every 8h.  2. Based on usage (see below) adjusting Dilaudid PCA. Adding basal of 0.2mg/h and 0.4mg every 6 min demand. 3. Dilaudid 1.5mg IV every 4h PRN pain not controlled by PCA. 4. Today restarted on Budesonide. 2. Constipation- home baseline constipation, now w/ incr opioids. 1. Appreciate GI adjusting medications incl incr Reglan and allowing home relistor. 3. Encourage increase ambulation and activity. Discussed w/ pt and mom at bedside. 4. Communicated plan of care with: Palliative IDT;  Evy Griggs w/ GI; Jennifer Nguyen RN       GOALS OF CARE / TREATMENT PREFERENCES:   [====Goals of Care====]  GOALS OF CARE:  Patient / health care proxy stated goals: pain control  Recovery from acute issues      TREATMENT PREFERENCES:   Code Status: Full Code    Advance Care Planning:  Advance Care Planning 6/26/2017   Patient's Healthcare Decision Maker is: Legal Next of Camilo Jiménez   Primary Decision Maker Name Josiane Lee   Primary Decision Maker Phone Number [phone]   Primary Decision Maker Relationship to Patient Parent   Confirm Advance Directive None   Patient Would Like to Complete Advance Directive No       Other:    The palliative care team has discussed with patient / health care proxy about goals of care / treatment preferences for patient.  [====Goals of Care====]         HISTORY:         HPI/SUBJECTIVE:    The patient is:   [x] Verbal and participatory  [] Non-participatory due to:     Pt w/ generalized abdominal pain. Initially improved w/ PCA but overnight did not get much sleep, incr pain when she woke up. No confusion, dizziness, sedation - confirmed by mother at bedside. Gets some relief w/ pushing button. No output in ostomy. Pain up to 10/10 but now 8. Current meds for pain:  Dilaudid PCA 0.2 mg every 6 min demand- ~8mg in past 8h (75 attempts!)~24mg in past 24h   Dilaudid 1.5mg IV every 4h prn - 1 dose in past 24h  Home Relistor   Xanax 1mg bid prn- 1 dose in past 24h   Reglan incr to 10mg qid today  ----    Has chronic pain from crohns, well managed on current morphine doses at home. Allows her to function. She follows at [facility] with Dr. Brandon Flores.      Clinical Pain Assessment (nonverbal scale for severity on nonverbal patients):   [++++ Clinical Pain Assessment++++]  [++++Pain Severity++++]: Pain: 8  [++++Pain Character++++]: sharp and burning, stabbing  [++++Pain Duration++++]: several days  [++++Pain Effect++++]: hard to walk, feeling anxious  [++++Pain Factors++++]: better after pain medicaion  [++++Pain Frequency++++]: constant  [++++Pain Location++++]: across abdomen both sides in middle  [++++ Clinical Pain Assessment++++]     FUNCTIONAL ASSESSMENT:     Palliative Performance Scale (PPS):  PPS: 50       PSYCHOSOCIAL/SPIRITUAL SCREENING:     Advance Care Planning:  Advance Care Planning 6/26/2017   Patient's Healthcare Decision Maker is: Legal Next of Camilo Jiménez   Primary Decision Maker Name Haley Rodriguez   Primary Decision Maker Phone Number [phone]   Primary Decision Maker Relationship to Patient Parent   Confirm Advance Directive None   Patient Would Like to Complete Advance Directive No        Any spiritual / Presybeterian concerns:  [] Yes /  [x] No    Caregiver Burnout:  [] Yes /  [x] No /  [] No Caregiver Present      Anticipatory grief assessment:   [x] Normal  / [] Maladaptive       ESAS Anxiety: Anxiety: 3    ESAS Depression: Depression: 0        REVIEW OF SYSTEMS:     Positive and pertinent negative findings in ROS are noted above in HPI. The following systems were [x] reviewed / [] unable to be reviewed as noted in HPI  Other findings are noted below. Systems: constitutional, ears/nose/mouth/throat, respiratory, gastrointestinal, genitourinary, musculoskeletal, integumentary, neurologic, psychiatric, endocrine. Positive findings noted below. Modified ESAS Completed by: provider   Fatigue: 3 Drowsiness: 0   Depression: 0 Pain: 8   Anxiety: 3 Nausea: 7   Anorexia: 5 Dyspnea: 0     Constipation: No              PHYSICAL EXAM:     From RN flowsheet:  Wt Readings from Last 3 Encounters:   06/27/17 316 lb 1.6 oz (143.4 kg)   06/25/17 343 lb (155.6 kg)   06/22/17 343 lb (155.6 kg)     Blood pressure (!) 144/100, pulse (!) 108, temperature 98.7 °F (37.1 °C), resp. rate 16, weight 316 lb 1.6 oz (143.4 kg), last menstrual period 05/21/2017, SpO2 96 %, not currently breastfeeding.     Pain Scale 1: Numeric (0 - 10)  Pain Intensity 1: 8  Pain Onset 1: Chronic  Pain Location 1: Abdomen  Pain Orientation 1: Mid  Pain Description 1: Aching  Pain Intervention(s) 1: Encouraged PCA  Last bowel movement, if known:     Constitutional: awake, alert, no confusion or somnolence  Eyes: pupils equal, anicteric  ENMT: no nasal discharge, moist mucous membranes  Cardiovascular: regular rhythm, distal pulses intact  Respiratory: breathing not labored, symmetric  Gastrointestinal: midline incision healing, no exudate +ostomy w/out stool, hypoactive bowel sounds   Musculoskeletal: no deformity, no tenderness to palpation  Skin: warm, dry  No edema  Neurologic: following commands, moving all extremities  Able to ambulate  Psychiatric: full affect, anxious       HISTORY:     Active Problems:    Abdominal pain (6/25/2017)      Past Medical History:   Diagnosis Date    Crohn's disease ([facility] [address] 8/15/2011    DVT (deep venous thrombosis) ([facility] [address] [facility] 8/15/2011    Incarcerated ventral hernia 8/15/2011    Right flank pain 8/22/2011    Seizures ([facility] [address] [facility]     Stroke [facility])       Past Surgical History:   Procedure Laterality Date    HX OTHER SURGICAL      multiple procedures related to her Crohn's disease    [place] LAP, INCISIONAL HERNIA REPAIR,INCARCERATED  9-10-08    dr. Kati Ro      Family History   Problem Relation Age of Onset    Hypertension Father     Stroke Father     Other Father      arthritis      History reviewed, no pertinent family history.   Social History   Substance Use Topics    Smoking status: Former Smoker    Smokeless tobacco: Not on file    Alcohol use No     Allergies   Allergen Reactions    Demerol [Meperidine] Unknown (comments)    Soma [Carisoprodol] Rash and Nausea Only    Sulfa (Sulfonamide Antibiotics) Unknown (comments)    Toradol [Ketorolac Tromethamine] Unknown (comments)      Current Facility-Administered Medications   Medication Dose Route Frequency    Vancomycin, Random - Please obtain now   Other NOW    metoclopramide (REGLAN) 5 mg/5 mL oral syrup 10 mg  10 mg Oral AC&HS    budesonide (ENTOCORT EC) capsule 9 mg  9 mg Oral 7am    OTHER(NON-FORMULARY) 150 mg  150 mg Oral DAILY    ondansetron (ZOFRAN) injection 8 mg  8 mg IntraVENous Q6H PRN    HYDROmorphone (PF) 15 mg/30 ml (DILAUDID) PCA   IntraVENous CONTINUOUS    prochlorperazine (COMPAZINE) with saline injection 5 mg  5 mg IntraVENous Q6H    HYDROmorphone (PF) (DILAUDID) injection 1.5 mg  1.5 mg IntraVENous Q4H PRN    pantoprazole (PROTONIX) tablet 40 mg  40 mg Oral ACB    lactated Ringers infusion  100 mL/hr IntraVENous CONTINUOUS    sodium chloride (NS) flush 5-10 mL  5-10 mL IntraVENous Q8H    sodium chloride (NS) flush 5-10 mL  5-10 mL IntraVENous PRN    acetaminophen (TYLENOL) tablet 650 mg  650 mg Oral Q4H PRN    naloxone (NARCAN) injection 0.4 mg  0.4 mg IntraVENous PRN    diphenhydrAMINE (BENADRYL) injection 12.5 mg  12.5 mg IntraVENous Q4H PRN    enoxaparin (LOVENOX) injection 40 mg  40 mg SubCUTAneous Q24H    piperacillin-tazobactam (ZOSYN) 3.375 g in 0.9% sodium chloride (MBP/ADV) 100 mL  3.375 g IntraVENous Q8H    morphine CR (MS CONTIN) tablet 60 mg  60 mg Oral TID    ALPRAZolam (XANAX) tablet 1 mg  1 mg Oral BID PRN    albuterol sulfate SR (PROVENTIL SR) tablet 4 mg  4 mg Oral BID PRN    albuterol (PROVENTIL VENTOLIN) nebulizer solution 2.5 mg  2.5 mg Nebulization Q4H PRN    methylnaltrexone (RELISTOR) injection 12 mg  12 mg SubCUTAneous DAILY PRN    Vancomycin ---Pharmacy to Dose   Other Rx Dosing/Monitoring    vancomycin (VANCOCIN) 2000 mg in  ml infusion  2,000 mg IntraVENous Q12H          LAB AND IMAGING FINDINGS:     Lab Results   Component Value Date/Time    WBC 24.8 06/28/2017 05:19 AM    HGB 11.3 06/28/2017 05:19 AM    PLATELET 458 36/25/2523 05:19 AM     Lab Results   Component Value Date/Time    Sodium 142 06/28/2017 05:19 AM    Potassium 3.0 06/28/2017 05:19 AM    Chloride 103 06/28/2017 05:19 AM    CO2 29 06/28/2017 05:19 AM    BUN 4 06/28/2017 05:19 AM    Creatinine 1.08 06/28/2017 05:19 AM    Calcium 8.3 06/28/2017 05:19 AM Magnesium 1.5 06/28/2017 04:00 AM    Phosphorus 2.4 06/28/2017 04:00 AM      Lab Results   Component Value Date/Time    AST (SGOT) 17 06/28/2017 05:19 AM    Alk. phosphatase 80 06/28/2017 05:19 AM    Protein, total 6.6 06/28/2017 05:19 AM    Albumin 2.0 06/28/2017 05:19 AM    Globulin 4.6 06/28/2017 05:19 AM     No results found for: INR, PTMR, PTP, PT1, PT2, APTT   No results found for: IRON, FE, TIBC, IBCT, PSAT, FERR   No results found for: PH, PCO2, PO2  No components found for: GLPOC   No results found for: CPK, CKMB             Total time:   Counseling / coordination time, spent as noted above:   > 50% counseling / coordination?:     Prolonged service was provided for  []30 min   []75 min in face to face time in the presence of the patient, spent as noted above. Time Start:   Time End:   Note: this can only be billed with 19606 (initial) or 37200 (follow up). If multiple start / stop times, list each separately.

## 2017-06-28 NOTE — PROGRESS NOTES
Day #4 of Vancomycin  Indication:  Post surgical site infection/SSTI.    - S/p Exlap w/lysis of adhesions. Now presenting with leukocytosis, S/Sx of infection, and mesenteric edema    Current regimen:  2000 mg IV Q 12hrs  Abx regimen:  Vancomycin and Zosyn  ID Following ?: NO  Concomitant nephrotoxic drugs (requires more frequent monitoring): None  Frequency of BMP?: Daily  Recent Labs      17   0519  17   1445  17   0613  17   0400   WBC  24.8*   --   25.6*  27.6*   CREA  1.08*  0.89   --   0.87   BUN  4*  6   --   9   Est CrCl: 52.5 ml/min (IBW); UO: ~0.9 ml/kg/hr  Temp (24hrs), Av.2 °F (37.3 °C), Min:98.4 °F (36.9 °C), Max:99.9 °F (37.7 °C)    Cultures:   wound with gram stain - Light MRSA (Vanc JOY = 2) - Final    Goal trough = 10 - 15 mcg/mL    Recent trough history (date/time/level/dose/action taken):  None thus far     Plan: Patient has had significant access issues; CVC now in place. SCr bump; vanc trough now predicted outside of range; will draw a random level now to assess for potential accumulation.

## 2017-06-29 ENCOUNTER — APPOINTMENT (OUTPATIENT)
Dept: GENERAL RADIOLOGY | Age: 42
DRG: 335 | End: 2017-06-29
Attending: SURGERY
Payer: MEDICARE

## 2017-06-29 ENCOUNTER — APPOINTMENT (OUTPATIENT)
Dept: GENERAL RADIOLOGY | Age: 42
DRG: 335 | End: 2017-06-29
Attending: INTERNAL MEDICINE
Payer: MEDICARE

## 2017-06-29 LAB
ANION GAP BLD CALC-SCNC: 19 MMOL/L (ref 5–15)
ANION GAP BLD CALC-SCNC: 9 MMOL/L (ref 5–15)
ARTERIAL PATENCY WRIST A: ABNORMAL
BASE EXCESS BLD CALC-SCNC: 1 MMOL/L
BASOPHILS # BLD AUTO: 0 K/UL (ref 0–0.1)
BASOPHILS # BLD: 0 % (ref 0–1)
BDY SITE: ABNORMAL
BUN BLD-MCNC: 4 MG/DL (ref 9–20)
BUN SERPL-MCNC: 5 MG/DL (ref 6–20)
BUN/CREAT SERPL: 3 (ref 12–20)
CA-I BLD-MCNC: 1.08 MMOL/L (ref 1.12–1.32)
CALCIUM SERPL-MCNC: 7.9 MG/DL (ref 8.5–10.1)
CHLORIDE BLD-SCNC: 98 MMOL/L (ref 98–107)
CHLORIDE SERPL-SCNC: 104 MMOL/L (ref 97–108)
CO2 BLD-SCNC: 26 MMOL/L (ref 21–32)
CO2 SERPL-SCNC: 28 MMOL/L (ref 21–32)
CREAT BLD-MCNC: 1.5 MG/DL (ref 0.6–1.3)
CREAT SERPL-MCNC: 1.57 MG/DL (ref 0.55–1.02)
DATE LAST DOSE: ABNORMAL
EOSINOPHIL # BLD: 0 K/UL (ref 0–0.4)
EOSINOPHIL NFR BLD: 0 % (ref 0–7)
ERYTHROCYTE [DISTWIDTH] IN BLOOD BY AUTOMATED COUNT: 14.9 % (ref 11.5–14.5)
GAS FLOW.O2 O2 DELIVERY SYS: ABNORMAL L/MIN
GAS FLOW.O2 SETTING OXYMISER: 10 BPM
GLUCOSE BLD-MCNC: 108 MG/DL (ref 65–100)
GLUCOSE SERPL-MCNC: 134 MG/DL (ref 65–100)
HCO3 BLD-SCNC: 25.8 MMOL/L (ref 22–26)
HCT VFR BLD AUTO: 30.6 % (ref 35–47)
HCT VFR BLD CALC: 31 % (ref 35–47)
HGB BLD-MCNC: 10.5 GM/DL (ref 11.5–16)
HGB BLD-MCNC: 9.6 G/DL (ref 11.5–16)
LYMPHOCYTES # BLD AUTO: 10 % (ref 12–49)
LYMPHOCYTES # BLD: 2.8 K/UL (ref 0.8–3.5)
MAGNESIUM SERPL-MCNC: 1.6 MG/DL (ref 1.6–2.4)
MCH RBC QN AUTO: 28.8 PG (ref 26–34)
MCHC RBC AUTO-ENTMCNC: 31.4 G/DL (ref 30–36.5)
MCV RBC AUTO: 91.9 FL (ref 80–99)
METAMYELOCYTES NFR BLD MANUAL: 1 %
MONOCYTES # BLD: 0.6 K/UL (ref 0–1)
MONOCYTES NFR BLD AUTO: 2 % (ref 5–13)
MYELOCYTES NFR BLD MANUAL: 1 %
NEUTS BAND NFR BLD MANUAL: 3 % (ref 0–6)
NEUTS SEG # BLD: 24.4 K/UL (ref 1.8–8)
NEUTS SEG NFR BLD AUTO: 83 % (ref 32–75)
O2/TOTAL GAS SETTING VFR VENT: 0.4 %
PCO2 BLD: 43.7 MMHG (ref 35–45)
PEEP RESPIRATORY: 5 CMH2O
PH BLD: 7.38 [PH] (ref 7.35–7.45)
PHOSPHATE SERPL-MCNC: 4.6 MG/DL (ref 2.6–4.7)
PLATELET # BLD AUTO: 233 K/UL (ref 150–400)
PO2 BLD: 100 MMHG (ref 80–100)
POTASSIUM BLD-SCNC: 3.3 MMOL/L (ref 3.5–5.1)
POTASSIUM SERPL-SCNC: 3.2 MMOL/L (ref 3.5–5.1)
PRESSURE SUPPORT SETTING VENT: 10 CMH2O
RBC # BLD AUTO: 3.33 M/UL (ref 3.8–5.2)
RBC MORPH BLD: ABNORMAL
REPORTED DOSE,DOSE: ABNORMAL UNITS
REPORTED DOSE/TIME,TMG: 1915
SAO2 % BLD: 98 % (ref 92–97)
SERVICE CMNT-IMP: ABNORMAL
SODIUM BLD-SCNC: 139 MMOL/L (ref 136–145)
SODIUM SERPL-SCNC: 141 MMOL/L (ref 136–145)
SPECIMEN TYPE: ABNORMAL
VANCOMYCIN TROUGH SERPL-MCNC: 21 UG/ML (ref 5–10)
VENTILATION MODE VENT: ABNORMAL
VOLUME CONTROL IVLC: YES
VT SETTING VENT: 550 ML
WBC # BLD AUTO: 28.4 K/UL (ref 3.6–11)

## 2017-06-29 PROCEDURE — 74000 XR ABD PORT  1 V: CPT

## 2017-06-29 PROCEDURE — 74011250637 HC RX REV CODE- 250/637: Performed by: SURGERY

## 2017-06-29 PROCEDURE — 36600 WITHDRAWAL OF ARTERIAL BLOOD: CPT

## 2017-06-29 PROCEDURE — 65610000006 HC RM INTENSIVE CARE

## 2017-06-29 PROCEDURE — 74000 XR ABD (KUB): CPT

## 2017-06-29 PROCEDURE — 36415 COLL VENOUS BLD VENIPUNCTURE: CPT | Performed by: SURGERY

## 2017-06-29 PROCEDURE — 74011000258 HC RX REV CODE- 258: Performed by: SURGERY

## 2017-06-29 PROCEDURE — 80048 BASIC METABOLIC PNL TOTAL CA: CPT | Performed by: NURSE PRACTITIONER

## 2017-06-29 PROCEDURE — 74011000250 HC RX REV CODE- 250: Performed by: SURGERY

## 2017-06-29 PROCEDURE — 04753DZ DILATION OF SUPERIOR MESENTERIC ARTERY WITH INTRALUMINAL DEVICE, PERCUTANEOUS APPROACH: ICD-10-PCS | Performed by: SURGERY

## 2017-06-29 PROCEDURE — C9113 INJ PANTOPRAZOLE SODIUM, VIA: HCPCS | Performed by: SURGERY

## 2017-06-29 PROCEDURE — B4101ZZ FLUOROSCOPY OF ABDOMINAL AORTA USING LOW OSMOLAR CONTRAST: ICD-10-PCS | Performed by: SURGERY

## 2017-06-29 PROCEDURE — 74011250636 HC RX REV CODE- 250/636

## 2017-06-29 PROCEDURE — 3331090002 HH PPS REVENUE DEBIT

## 2017-06-29 PROCEDURE — 74011250636 HC RX REV CODE- 250/636: Performed by: NURSE PRACTITIONER

## 2017-06-29 PROCEDURE — 74011250636 HC RX REV CODE- 250/636: Performed by: INTERNAL MEDICINE

## 2017-06-29 PROCEDURE — 94002 VENT MGMT INPAT INIT DAY: CPT

## 2017-06-29 PROCEDURE — 74011000258 HC RX REV CODE- 258: Performed by: INTERNAL MEDICINE

## 2017-06-29 PROCEDURE — 3331090001 HH PPS REVENUE CREDIT

## 2017-06-29 PROCEDURE — 74011250636 HC RX REV CODE- 250/636: Performed by: SURGERY

## 2017-06-29 PROCEDURE — 71010 XR CHEST PORT: CPT

## 2017-06-29 PROCEDURE — 85025 COMPLETE CBC W/AUTO DIFF WBC: CPT | Performed by: NURSE PRACTITIONER

## 2017-06-29 PROCEDURE — 94003 VENT MGMT INPAT SUBQ DAY: CPT

## 2017-06-29 PROCEDURE — B4141ZZ FLUOROSCOPY OF SUPERIOR MESENTERIC ARTERY USING LOW OSMOLAR CONTRAST: ICD-10-PCS | Performed by: SURGERY

## 2017-06-29 PROCEDURE — 83735 ASSAY OF MAGNESIUM: CPT | Performed by: NURSE PRACTITIONER

## 2017-06-29 PROCEDURE — 36591 DRAW BLOOD OFF VENOUS DEVICE: CPT

## 2017-06-29 PROCEDURE — 74011250637 HC RX REV CODE- 250/637: Performed by: INTERNAL MEDICINE

## 2017-06-29 PROCEDURE — 84100 ASSAY OF PHOSPHORUS: CPT | Performed by: NURSE PRACTITIONER

## 2017-06-29 PROCEDURE — 80202 ASSAY OF VANCOMYCIN: CPT | Performed by: SURGERY

## 2017-06-29 PROCEDURE — 82803 BLOOD GASES ANY COMBINATION: CPT

## 2017-06-29 RX ORDER — HYDROMORPHONE HYDROCHLORIDE 1 MG/ML
2 INJECTION, SOLUTION INTRAMUSCULAR; INTRAVENOUS; SUBCUTANEOUS ONCE
Status: COMPLETED | OUTPATIENT
Start: 2017-06-29 | End: 2017-06-29

## 2017-06-29 RX ORDER — SODIUM CHLORIDE 9 MG/ML
75 INJECTION, SOLUTION INTRAVENOUS CONTINUOUS
Status: DISCONTINUED | OUTPATIENT
Start: 2017-06-29 | End: 2017-07-01

## 2017-06-29 RX ORDER — POTASSIUM CHLORIDE 7.45 MG/ML
10 INJECTION INTRAVENOUS
Status: COMPLETED | OUTPATIENT
Start: 2017-06-29 | End: 2017-06-29

## 2017-06-29 RX ORDER — PROPOFOL 10 MG/ML
INJECTION, EMULSION INTRAVENOUS
Status: DISCONTINUED | OUTPATIENT
Start: 2017-06-29 | End: 2017-06-29 | Stop reason: HOSPADM

## 2017-06-29 RX ORDER — VANCOMYCIN/0.9 % SOD CHLORIDE 1.5G/250ML
1500 PLASTIC BAG, INJECTION (ML) INTRAVENOUS EVERY 24 HOURS
Status: DISCONTINUED | OUTPATIENT
Start: 2017-06-30 | End: 2017-06-30

## 2017-06-29 RX ORDER — IPRATROPIUM BROMIDE AND ALBUTEROL SULFATE 2.5; .5 MG/3ML; MG/3ML
3 SOLUTION RESPIRATORY (INHALATION)
Status: DISCONTINUED | OUTPATIENT
Start: 2017-06-29 | End: 2017-07-18 | Stop reason: ALTCHOICE

## 2017-06-29 RX ORDER — CLOPIDOGREL BISULFATE 75 MG/1
75 TABLET ORAL DAILY
Status: DISCONTINUED | OUTPATIENT
Start: 2017-06-29 | End: 2017-07-05

## 2017-06-29 RX ORDER — CHLORHEXIDINE GLUCONATE 1.2 MG/ML
15 RINSE ORAL 2 TIMES DAILY
Status: DISCONTINUED | OUTPATIENT
Start: 2017-06-29 | End: 2017-06-30

## 2017-06-29 RX ORDER — HYDROMORPHONE HYDROCHLORIDE 1 MG/ML
INJECTION, SOLUTION INTRAMUSCULAR; INTRAVENOUS; SUBCUTANEOUS
Status: DISPENSED
Start: 2017-06-29 | End: 2017-06-30

## 2017-06-29 RX ADMIN — Medication 50 MCG/HR: at 01:04

## 2017-06-29 RX ADMIN — Medication 10 ML: at 15:45

## 2017-06-29 RX ADMIN — PROPOFOL 75 MCG/KG/MIN: 10 INJECTION, EMULSION INTRAVENOUS at 01:00

## 2017-06-29 RX ADMIN — SODIUM CHLORIDE 125 ML/HR: 900 INJECTION, SOLUTION INTRAVENOUS at 01:19

## 2017-06-29 RX ADMIN — PROPOFOL 50 MCG/KG/MIN: 10 INJECTION, EMULSION INTRAVENOUS at 16:13

## 2017-06-29 RX ADMIN — PIPERACILLIN SODIUM,TAZOBACTAM SODIUM 3.38 G: 3; .375 INJECTION, POWDER, FOR SOLUTION INTRAVENOUS at 05:57

## 2017-06-29 RX ADMIN — CHLORHEXIDINE GLUCONATE 15 ML: 1.2 RINSE ORAL at 09:34

## 2017-06-29 RX ADMIN — PROPOFOL 30 MCG/KG/MIN: 10 INJECTION, EMULSION INTRAVENOUS at 06:44

## 2017-06-29 RX ADMIN — SODIUM CHLORIDE 200 MG: 900 INJECTION, SOLUTION INTRAVENOUS at 18:48

## 2017-06-29 RX ADMIN — POTASSIUM CHLORIDE 10 MEQ: 10 INJECTION, SOLUTION INTRAVENOUS at 13:06

## 2017-06-29 RX ADMIN — PROPOFOL 50 MCG/KG/MIN: 10 INJECTION, EMULSION INTRAVENOUS at 18:22

## 2017-06-29 RX ADMIN — Medication 10 ML: at 21:15

## 2017-06-29 RX ADMIN — Medication 10 ML: at 06:45

## 2017-06-29 RX ADMIN — Medication 200 MCG/HR: at 19:13

## 2017-06-29 RX ADMIN — POTASSIUM CHLORIDE 10 MEQ: 10 INJECTION, SOLUTION INTRAVENOUS at 11:49

## 2017-06-29 RX ADMIN — PROPOFOL 50 MCG/KG/MIN: 10 INJECTION, EMULSION INTRAVENOUS at 04:21

## 2017-06-29 RX ADMIN — SODIUM CHLORIDE 40 MG: 9 INJECTION INTRAMUSCULAR; INTRAVENOUS; SUBCUTANEOUS at 08:20

## 2017-06-29 RX ADMIN — CLOPIDOGREL BISULFATE 75 MG: 75 TABLET ORAL at 11:49

## 2017-06-29 RX ADMIN — ENOXAPARIN SODIUM 40 MG: 40 INJECTION SUBCUTANEOUS at 23:16

## 2017-06-29 RX ADMIN — PROPOFOL 50 MCG/KG/MIN: 10 INJECTION, EMULSION INTRAVENOUS at 09:23

## 2017-06-29 RX ADMIN — CALCIUM GLUCONATE: 94 INJECTION, SOLUTION INTRAVENOUS at 18:34

## 2017-06-29 RX ADMIN — POTASSIUM CHLORIDE 10 MEQ: 10 INJECTION, SOLUTION INTRAVENOUS at 10:47

## 2017-06-29 RX ADMIN — DIPHENHYDRAMINE HYDROCHLORIDE 12.5 MG: 50 INJECTION, SOLUTION INTRAMUSCULAR; INTRAVENOUS at 15:19

## 2017-06-29 RX ADMIN — Medication 200 MCG/HR: at 23:50

## 2017-06-29 RX ADMIN — PROPOFOL 75 MCG/KG/MIN: 10 INJECTION, EMULSION INTRAVENOUS at 01:02

## 2017-06-29 RX ADMIN — PROPOFOL 50 MCG/KG/MIN: 10 INJECTION, EMULSION INTRAVENOUS at 23:16

## 2017-06-29 RX ADMIN — PROPOFOL 50 MCG/KG/MIN: 10 INJECTION, EMULSION INTRAVENOUS at 13:59

## 2017-06-29 RX ADMIN — ONDANSETRON 8 MG: 2 INJECTION INTRAMUSCULAR; INTRAVENOUS at 15:44

## 2017-06-29 RX ADMIN — SODIUM CHLORIDE 125 ML/HR: 900 INJECTION, SOLUTION INTRAVENOUS at 09:24

## 2017-06-29 RX ADMIN — PIPERACILLIN SODIUM,TAZOBACTAM SODIUM 3.38 G: 3; .375 INJECTION, POWDER, FOR SOLUTION INTRAVENOUS at 14:01

## 2017-06-29 RX ADMIN — Medication 200 MCG/HR: at 15:27

## 2017-06-29 RX ADMIN — HYDROMORPHONE HYDROCHLORIDE 1.5 MG: 1 INJECTION, SOLUTION INTRAMUSCULAR; INTRAVENOUS; SUBCUTANEOUS at 13:59

## 2017-06-29 RX ADMIN — PIPERACILLIN SODIUM,TAZOBACTAM SODIUM 3.38 G: 3; .375 INJECTION, POWDER, FOR SOLUTION INTRAVENOUS at 21:17

## 2017-06-29 RX ADMIN — PROPOFOL 50 MCG/KG/MIN: 10 INJECTION, EMULSION INTRAVENOUS at 11:49

## 2017-06-29 RX ADMIN — CHLORHEXIDINE GLUCONATE 15 ML: 1.2 RINSE ORAL at 20:13

## 2017-06-29 RX ADMIN — Medication 200 MCG/HR: at 10:47

## 2017-06-29 RX ADMIN — PROPOFOL 50 MCG/KG/MIN: 10 INJECTION, EMULSION INTRAVENOUS at 21:14

## 2017-06-29 RX ADMIN — HYDROMORPHONE HYDROCHLORIDE 2 MG: 1 INJECTION, SOLUTION INTRAMUSCULAR; INTRAVENOUS; SUBCUTANEOUS at 16:00

## 2017-06-29 RX ADMIN — POTASSIUM CHLORIDE 10 MEQ: 10 INJECTION, SOLUTION INTRAVENOUS at 09:34

## 2017-06-29 RX ADMIN — SODIUM CHLORIDE 125 ML/HR: 900 INJECTION, SOLUTION INTRAVENOUS at 17:29

## 2017-06-29 RX ADMIN — Medication 200 MCG/HR: at 07:00

## 2017-06-29 NOTE — PROGRESS NOTES
Spiritual Care Assessment/Progress Notes    Kat Hampton [id number]  [id number]    1975  46 y.o.  female    Patient Telephone Number: [phone] (home)   Pentecostalism Affiliation: Non Scientology   Language: English   Extended Emergency Contact Information  Primary Emergency Contact: Cain Robles  Address: [address] [place] [address] Phone: [phone]  Relation: Parent   Patient Active Problem List    Diagnosis Date Noted    Abdominal pain 06/25/2017    Perforated bowel ([facility] [address] 06/06/2017    Right flank pain 08/22/2011    Crohn's disease ([facility] [address] 08/15/2011    DVT (deep venous thrombosis) ([facility] 08/15/2011    Incarcerated ventral hernia 08/15/2011        Date: 6/29/2017       Level of Pentecostalism/Spiritual Activity:  []         Involved in jalen tradition/spiritual practice    []         Not involved in jalen tradition/spiritual practice  [x]         Spiritually oriented    []         Claims no spiritual orientation    []         seeking spiritual identity  []         Feels alienated from Church practice/tradition  []         Feels angry about Church practice/tradition  [x]         Spirituality/Church tradition IS a resource for coping at this time.   []         Not able to assess due to medical condition    Services Provided Today:  []         crisis intervention    []         reading Scriptures  [x]         spiritual assessment    []         prayer  [x]         empathic listening/emotional support  []         rites and rituals (cite in comments)  []         life review     []         Church support  []         theological development   []         advocacy  []         ethical dialog     []         blessing  []         bereavement support    [x]         support to family  []         anticipatory grief support   []         help with AMD  []         spiritual guidance    []         meditation      Spiritual Care Needs  [x]         Emotional Support  [x]         Spiritual/Yarsanism Care  []         Loss/Adjustment  []         Advocacy/Referral                /Ethics  []         No needs expressed at               this time  []         Other: (note in               comments)  Spiritual Care Plan  []         Follow up visits with               pt/family  []         Provide materials  []         Schedule sacraments  []         Contact Community               Clergy  [x]         Follow up as needed  []         Other: (note in               comments)     Comments: Saw Ms Leydi Boyd for initial spiritual assessment. Ms Leydi Boyd was intubated and sedated; patient's nurse was providing care for her at time of visit. Provided compassionate presence to patient's father who was present. Father became emotional as he shared that his father  approximately a year ago and he was now the sole caregiver for his 80year-old mother in addition to trying to be present for his daughter. Father shared that it was God that was helping him to cope with it all. Ms Kwong's father shared that patient & her mother had been attending [facility] but thought it was undergoing a lot of changes right now. Conversation was interrupted by x-ray techs who arrived to take an x-ray. Assured patient's father of prayers on their behalf and of 24-hour  availability for support. Plan: Chaplains will continue to be available for patient/family support as needed/able. : Rev. Dougie Walters.  Soledad [facility]; [facility], to contact [phone] call: [phone]

## 2017-06-29 NOTE — PERIOP NOTES
Abg's completed, results ( 7.38ph, co2 43.7, 02 100, be 1, 25.8 hco3, sa02 98%) vent settings fi02 40%, tv 550, rate 10, peep 5, pressure support 10) called to pulmonologist, Dr Rosangela Bhatia, no changes needed to vent settings per Dr. Rosangela Bhatia, he was made aware of new consult for vent management    Xray completed for ng tube placement

## 2017-06-29 NOTE — PROGRESS NOTES
Palliative Medicine Consult  [place]: [phone] ([phone])    Patient Name: David Otoole  YOB: 1975    Date of Initial Consult: 6/27/17  Reason for Consult: overwhelming symptoms  Requesting Provider: Genevieve Dobson NP  Primary Care Physician: Anita Leung MD      SUMMARY:   David Otoole is a 46 y.o. with a past history of Crohn's disease,anxiety,  chronic pain, hx DVT, multiple (4) small bowel resections, s/p recent urgent dx lap, with lysis of adhesions, repair of suspected perf 6/7/17 , who was admitted on 6/25/2017 from home with a diagnosis of worsening abdominal pain, elevated WBC and MRSA wound infection. Initially consulted for pain management thought to be possible Crohn's flare. Initial CT on 6/25 w/out acute findings but repeat on 6/28 showing ischemic bowl. S/p ex lap but ischemic bowl unresectable, s/p SMA stent. Pt in ICU, intubated. Prognosis unclear. PALLIATIVE DIAGNOSES:   1. Ischemic bowl- unresectable  2. Abd pain, acute on chronic   3. Resp distress- intubated   4. Nausea  5. Crohn's disease- just restarted on home budesonide   6. Chronic constipation on home relistor   7. MRSA wound        PLAN:   1. Pain management now w/ Fentanyl ggt and also sedated on vent. 2. While my team initially consulted to help w/ PCA management, glad that we were involved now as pt's prognosis unclear. Surgery uncertain that pt can survive.    3. Will cont to follow for support, will touch base w/ surgery as more is known about pt's condition to see how my team can best assist.  4. Communicated plan of care with: Palliative IDT       GOALS OF CARE / TREATMENT PREFERENCES:   [====Goals of Care====]  GOALS OF CARE:  Patient / health care proxy stated goals: pain control  Recovery from acute issues      TREATMENT PREFERENCES:   Code Status: Full Code    Advance Care Planning:  Advance Care Planning 6/26/2017   Patient's Healthcare Decision Maker is: Legal Next of Kin   Primary Decision Maker Name Kylie Castillo   Primary Decision Maker Phone Number [phone]   Primary Decision Maker Relationship to Patient Parent   Confirm Advance Directive None   Patient Would Like to Complete Advance Directive No       Other:    The palliative care team has discussed with patient / health care proxy about goals of care / treatment preferences for patient.  [====Goals of Care====]         HISTORY:         HPI/SUBJECTIVE:    The patient is:   [] Verbal and participatory  [x] Non-participatory due to: Medical condition     Pt w/ emergent surgery yest, currently intubated and sedated. -----  Has chronic pain from crohns, well managed on current morphine doses at home. Allows her to function. She follows at [facility] with Dr. Fish Aviles.      Clinical Pain Assessment (nonverbal scale for severity on nonverbal patients):   [++++ Clinical Pain Assessment++++]  [++++Pain Severity++++]: Pain: 0  [++++Pain Character++++]: sharp and burning, stabbing  [++++Pain Duration++++]: several days  [++++Pain Effect++++]: hard to walk, feeling anxious  [++++Pain Factors++++]: better after pain medicaion  [++++Pain Frequency++++]: constant  [++++Pain Location++++]: across abdomen both sides in middle  [++++ Clinical Pain Assessment++++]     FUNCTIONAL ASSESSMENT:     Palliative Performance Scale (PPS):  PPS: 30       PSYCHOSOCIAL/SPIRITUAL SCREENING:     Advance Care Planning:  Advance Care Planning 6/26/2017   Patient's Healthcare Decision Maker is: Legal Next of Camilo 71   Primary Decision Maker Name Kylie Castillo   Primary Decision Maker Phone Number [phone]   Primary Decision Maker Relationship to Patient Parent   Confirm Advance Directive None   Patient Would Like to Complete Advance Directive No        Any spiritual / Pentecostal concerns:  [] Yes /  [x] No    Caregiver Burnout:  [] Yes /  [x] No /  [] No Caregiver Present      Anticipatory grief assessment:   [x] Normal  / [] Maladaptive       ESAS Anxiety: Anxiety: 3    ESAS Depression: Depression: 0        REVIEW OF SYSTEMS:     Positive and pertinent negative findings in ROS are noted above in HPI. The following systems were [x] reviewed / [] unable to be reviewed as noted in HPI  Other findings are noted below. Systems: constitutional, ears/nose/mouth/throat, respiratory, gastrointestinal, genitourinary, musculoskeletal, integumentary, neurologic, psychiatric, endocrine. Positive findings noted below. Modified ESAS Completed by: provider   Fatigue: 10 Drowsiness: 0   Depression: 0 Pain: 0   Anxiety: 3 Nausea: 0   Anorexia: 0 Dyspnea: 0     Constipation: No              PHYSICAL EXAM:     From RN flowsheet:  Wt Readings from Last 3 Encounters:   06/27/17 316 lb 1.6 oz (143.4 kg)   06/25/17 343 lb (155.6 kg)   06/22/17 343 lb (155.6 kg)     Blood pressure 115/62, pulse (!) 107, temperature 98.3 °F (36.8 °C), resp. rate 19, weight 316 lb 1.6 oz (143.4 kg), last menstrual period 05/21/2017, SpO2 100 %, not currently breastfeeding.     Pain Scale 1: Adult Nonverbal Pain Scale  Pain Intensity 1: 0  Pain Onset 1: Chronic  Pain Location 1: Abdomen  Pain Orientation 1: Mid  Pain Description 1: Aching  Pain Intervention(s) 1: Medication (see MAR)  Last bowel movement, if known:     Constitutional:sedated, on vent   Eyes: pupils equal, anicteric  ENMT: no nasal discharge, moist mucous membranes  Respiratory: breathing not labored, intubated   Gastrointestinal: midline incision w/ dressing in place,  +ostomy w/out stool, hypoactive bowel sounds   Musculoskeletal: no deformity, no tenderness to palpation  Skin: warm, dry  No edema  Neurologic: sedated      HISTORY:     Active Problems:    Abdominal pain (6/25/2017)      Past Medical History:   Diagnosis Date    Crohn's disease ([facility] 8/15/2011    DVT (deep venous thrombosis) ([facility] 8/15/2011    Incarcerated ventral hernia 8/15/2011    Right flank pain 8/22/2011    Seizures ([facility]     Stroke [facility])       Past Surgical History: Procedure Laterality Date    HX OTHER SURGICAL      multiple procedures related to her Crohn's disease    TX LAP, INCISIONAL HERNIA REPAIR,INCARCERATED  9-10-08    dr. Carlos Coon History   Problem Relation Age of Onset    Hypertension Father     Stroke Father     Other Father      arthritis      History reviewed, no pertinent family history.   Social History   Substance Use Topics    Smoking status: Former Smoker    Smokeless tobacco: Not on file    Alcohol use No     Allergies   Allergen Reactions    Demerol [Meperidine] Unknown (comments)    Soma [Carisoprodol] Rash and Nausea Only    Sulfa (Sulfonamide Antibiotics) Unknown (comments)    Toradol [Ketorolac Tromethamine] Unknown (comments)      Current Facility-Administered Medications   Medication Dose Route Frequency    pantoprazole (PROTONIX) 40 mg in sodium chloride 0.9 % 10 mL injection  40 mg IntraVENous DAILY    0.9% sodium chloride infusion  125 mL/hr IntraVENous CONTINUOUS    albuterol-ipratropium (DUO-NEB) 2.5 MG-0.5 MG/3 ML  3 mL Nebulization Q6H PRN    chlorhexidine (PERIDEX) 0.12 % mouthwash 15 mL  15 mL Oral BID    potassium chloride 10 mEq in 100 ml IVPB  10 mEq IntraVENous Q1H    clopidogrel (PLAVIX) tablet 75 mg  75 mg Per NG tube DAILY    prochlorperazine (COMPAZINE) with saline injection 5 mg  5 mg IntraVENous Q6H PRN    ondansetron (ZOFRAN) injection 8 mg  8 mg IntraVENous Q6H PRN    vancomycin (VANCOCIN) 2000 mg in  ml infusion  2,000 mg IntraVENous Q24H    sodium chloride (NS) flush 5-10 mL  5-10 mL IntraVENous Q8H    sodium chloride (NS) flush 5-10 mL  5-10 mL IntraVENous PRN    propofol (DIPRIVAN) infusion  5-50 mcg/kg/min IntraVENous TITRATE    fentaNYL (PF) 900 mcg/30 ml infusion soln  0-200 mcg/hr IntraVENous TITRATE    HYDROmorphone (PF) (DILAUDID) injection 1.5 mg  1.5 mg IntraVENous Q4H PRN    sodium chloride (NS) flush 5-10 mL  5-10 mL IntraVENous Q8H    sodium chloride (NS) flush 5-10 mL 5-10 mL IntraVENous PRN    naloxone (NARCAN) injection 0.4 mg  0.4 mg IntraVENous PRN    diphenhydrAMINE (BENADRYL) injection 12.5 mg  12.5 mg IntraVENous Q4H PRN    enoxaparin (LOVENOX) injection 40 mg  40 mg SubCUTAneous Q24H    piperacillin-tazobactam (ZOSYN) 3.375 g in 0.9% sodium chloride (MBP/ADV) 100 mL  3.375 g IntraVENous Q8H    albuterol (PROVENTIL VENTOLIN) nebulizer solution 2.5 mg  2.5 mg Nebulization Q4H PRN    Vancomycin ---Pharmacy to Dose   Other Rx Dosing/Monitoring          LAB AND IMAGING FINDINGS:     Lab Results   Component Value Date/Time    WBC 28.4 06/29/2017 03:34 AM    HGB 9.6 06/29/2017 03:34 AM    PLATELET 786 26/41/4718 03:34 AM     Lab Results   Component Value Date/Time    Sodium 141 06/29/2017 03:34 AM    Potassium 3.2 06/29/2017 03:34 AM    Chloride 104 06/29/2017 03:34 AM    CO2 28 06/29/2017 03:34 AM    BUN 5 06/29/2017 03:34 AM    Creatinine 1.57 06/29/2017 03:34 AM    Calcium 7.9 06/29/2017 03:34 AM    Magnesium 1.6 06/29/2017 03:34 AM    Phosphorus 4.6 06/29/2017 03:34 AM      Lab Results   Component Value Date/Time    AST (SGOT) 17 06/28/2017 05:19 AM    Alk. phosphatase 80 06/28/2017 05:19 AM    Protein, total 6.6 06/28/2017 05:19 AM    Albumin 2.0 06/28/2017 05:19 AM    Globulin 4.6 06/28/2017 05:19 AM     No results found for: INR, PTMR, PTP, PT1, PT2, APTT   No results found for: IRON, FE, TIBC, IBCT, PSAT, FERR   No results found for: PH, PCO2, PO2  No components found for: GLPOC   No results found for: CPK, CKMB             Total time: 15 min   Counseling / coordination time, spent as noted above: 10 min   > 50% counseling / coordination?: yes    Prolonged service was provided for  []30 min   []75 min in face to face time in the presence of the patient, spent as noted above. Time Start:   Time End:   Note: this can only be billed with 31900 (initial) or 79683 (follow up). If multiple start / stop times, list each separately.

## 2017-06-29 NOTE — PROGRESS NOTES
0800 assumed care of pt  0830 Alexa Fall and Duncan Jaime in to see pt- potassium ordered  0930 Dr Shawnee Serrano in and orders given  Dr Carol Laura in and will start Plavix per NGT  1200 Plavix given down the NGT and clamped- PCXR done  1300 pt on her last 10 meq kcl  1330 Dietician in- family at bedside  1445 pt placed on bariatric bed- with help from 5 nurses to ensure line in place  1510 GI NP in and the ? NGT is curled in the mouth-2 Rn's - the NGT tube repositioned and KUB ordered to check placement  1600 extra dose of diluadid given per order- kub now shows the ngt in the stomach  1800 pt continues to rest -Dr Randell Van in to see pt- new orders- awaiting peridex mouthwash from pharmacy  1900 shift summary: pt in new bed-urine output  35 to 50 an hour ngt in place and draining greenish drainage - CHRIS still in place-pt has been resting since 1630 VSS inproved-

## 2017-06-29 NOTE — OP NOTES
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 POSTOPERATIVE DIAGNOSIS: Superior mesenteric artery   thrombosis. PROCEDURES PERFORMED    1. Left arm brachial artery access. 2. Cannulation of aorta. 3. Aortic angiogram.   4. Mesenteric arteriogram.   5. Selective mesenteric catheterization. 6. Angioplasty stenting of superior mesenteric artery. SURGEON: Jeromy Horne MD    ANESTHESIA: General.    ESTIMATED BLOOD LOSS: 200 mL. SPECIMENS REMOVED: None. COMPLICATIONS: None. INDICATION FOR PROCEDURE: The patient is a 68-year-old female   who has just undergone abdominal exploration for ischemic bowel   related to SMA thrombosis. A \"frozen\" abdomen was encountered   during the procedure, please refer to the general surgery dictation for   details. The inability to access the SMA intra-abdominally due to   extensive adhesions, thus it was felt to be unsafe to proceed this way. It was decided to perform endovascular stenting/embolectomy of this   SMA thrombosis. The emergent nature of this was discussed with the   family prior to commencing with this. DESCRIPTION OF PROCEDURE: The patient was placed supine on   the operating table, the left arm was prepped and draped in standard   surgical fashion. Using a micro-puncture system, the left brachial artery   was accessed retrograde, exchanged for a 6-German sheath. A   combination of Glidewire and Berenstein catheter were used to   advance into the thoracic abdominal aorta, and then into the   descending thoracic aorta. This was advanced to the level of the   diaphragm and exchanged for a pigtail sheath. The patient was   heparinized with 5000 units of heparin.  At this point, significant difficulty with   imaging was encountered due to x-ray constraints, as well as patient's   very large body habitus and positioning. After better imaging was   obtained, a combination of MP1 guiding cath and a Glidewire were   used to access both the celiac artery and superior mesenteric artery. A   mesenteric arteriogram was taken of both of these. What appeared to   be the stenotic area of the SMA was visualized with a little bit of   difficulty. The wire was easily advanced through this. This area was   then stented with a balloon-expandable stent. A 6 x 37 mm bare-metal   EV3 Visi-Pro stent was then placed. The rest of the SMA was   angioplastied gently with the same balloon. After this was removed, the   MP1 guide cath was then placed through this area again and   arteriograms were performed revealing patent SMA and excellent   distal filling. The wires, catheter and sheaths were removed. The left   brachial access was closed with Mynx device. A dry sterile dressing   was applied. There was a good radial Doppler signal following the   procedure.         Ari Urbina MD AM / Catherine Quesada   D:  06/29/2017   00:15   T:  06/29/2017   15:21   Job #:  [id number]

## 2017-06-29 NOTE — PROGRESS NOTES
ID Progress Note  2017    Subjective:     Events of last 24 hours noted--she is now on the vent in the ICU. Inoperable abdomen. Objective:     Antibiotics:  1. Vancomycin  2. Zosyn       Vitals:   Visit Vitals    /56 (BP 1 Location: Right arm, BP Patient Position: At rest)    Pulse (!) 122    Temp 98.7 °F (37.1 °C)    Resp 20    Ht 5' 4\" (1.626 m)    Wt 152.8 kg (336 lb 13.8 oz)    LMP 2017 (Approximate)    SpO2 100%    Breastfeeding No    BMI 57.82 kg/m2        Tmax:  Temp (24hrs), Av.2 °F (36.8 °C), Min:97.4 °F (36.3 °C), Max:98.8 °F (37.1 °C)      Exam:  Lungs:  clear to auscultation bilaterally  Heart:  regularly irregular rhythm  Abdomen:  abnormal findings:  tenderness moderate in the entire abdomen, hypoactive bowel sounds    Labs:      Recent Labs      17   0334  17   0519  17   1445  17   0613   WBC  28.4*  24.8*   --   25.6*   HGB  9.6*  11.3*   --   12.3   PLT  233  251   --   237   BUN  5*  4*  6   --    CREA  1.57*  1.08*  0.89   --    SGOT   --   17   --    --    AP   --   80   --    --    TBILI   --   0.5   --    --        Cultures:     Lab Results   Component Value Date/Time    Specimen Description: URINE 2009 07:45 PM     Lab Results   Component Value Date/Time    Culture result: NO GROWTH AFTER 21 HOURS 2017 12:17 PM    Culture result: NO GROWTH 2 DAYS 2017 02:35 PM    Culture result:  2017 06:15 PM     LIGHT  **METHICILLIN RESISTANT STAPHYLOCOCCUS AUREUS**         Radiology:     Line/Insert Date:           Assessment:     1. Ischemic gut with frozen abdomen  2. Leukocytosis  3. VDRF  4. Grim prognosis    Objective:     1. Continue IV therapy  2.  Follow up cultures and studies    Josef Mccoy MD

## 2017-06-29 NOTE — CONSULTS
PULMONARY/CRITICAL CARE/SLEEP MEDICINE    Initial Physician Consultation Note    Name: Rebecca Ortiz   : 1975   MRN: [id number]   Date: 2017      Subjective:   Consult Note: 2017     This patient has been seen and evaluated as a new patient to the ICU. Medical records and data reviewed. Patient is a 46 y.o. female who has been admitted to the ICU with post op pulmonary insufficiency after laparotomy and attempted resecition of ischemic bowel. Intraop findings showed frozen abd and SMA stent by subclavian approach was placed. It is unclear if endovascular treatment has resulted in reperfusion. She is hemodynamically stable and is chronically on large doses of opiates for pain management followed by Palliative Medicine. Assessment:   Postop pulmonary insufficiency  Ischemic bowel, unresectable, S/P SMA stent  DEAN- non-oliguric  Opiate dependence  Crohn's disease, H/O repeated abd surgeries  Other medical problems per chart      Recommendations:     VACV  Prognosis is unclear at this time and surgery recommends continuing mechanical ventilation  On abx  Vent bundle  TORIBIO and DVT prophylaxis  Post op care per surgery  Pain control per Palliative Medicine  For TPN  CXR for ETT position  Monitor renal function  AM labs including lactate  Critically ill, at risk for further decline. D/W Dr. Milady Rendon, Dr. Fredy Figueroa and RN  CCT 35'.  Mother at bedside had no questions of me      Active Problem List:     Problem List  Date Reviewed: 2017          Codes Class    Abdominal pain ICD-10-CM: R10.9  ICD-9-CM: 789.00         Perforated bowel ([facility] ICD-10-CM: K63.1  ICD-9-CM: 569.83         Right flank pain ICD-10-CM: R10.9  ICD-9-CM: 789.09         Crohn's disease ([facility] ICD-10-CM: K50.90  ICD-9-CM: 555.9         DVT (deep venous thrombosis) ([facility] ICD-10-CM: I82.409  ICD-9-CM: 453.40     Overview Signed 8/15/2011  2:08 PM by Hira So LPN     58             Incarcerated ventral hernia ICD-10-CM: K46.0  ICD-9-CM: 552.20     Overview Signed 8/15/2011  2:30 PM by Socorro Mantilla LPN     History of                   Past Medical History:      has a past medical history of Crohn's disease ([facility] (8/15/2011); [facility] (8/15/2011); Incarcerated ventral hernia (8/15/2011); Right flank pain (8/22/2011); Seizures ([facility] and Stroke ([facility] Past Surgical History:      has a past surgical history that includes other surgical and lap, incisional hernia repair,incarcerated (9-10-08). Home Medications:     Prior to Admission medications    Medication Sig Start Date End Date Taking? Authorizing Provider   sucralfate (CARAFATE) 100 mg/mL suspension Take 1 g by mouth four (4) times daily. 6/23/17  Yes Jalen Carrion MD   docusate sodium (COLACE) 100 mg capsule Take 100 mg by mouth daily. 6/22/17  Yes Minh Nathan MD   lactobacillus comb no.10 (PROBIOTIC) 20 billion cell cap Take 1 Cap by mouth two (2) times a day. 6/22/17  Yes Minh Nathan MD   HYDROmorphone (DILAUDID) 2 mg tablet Take 1 Tab by mouth every four (4) hours as needed. Max Daily Amount: 12 mg. 6/21/17  Yes Minh Nathan MD   clindamycin (CLEOCIN) 300 mg capsule Take 1 Cap by mouth three (3) times daily for 7 days. 6/21/17 6/28/17 Yes Theodora Urban MD   cyanocobalamin, vitamin B-12, 5,000 mcg TbDL Take 5,000 mcg by mouth Every Friday. Yes Historical Provider   promethazine (PHENERGAN) 25 mg tablet Take 25 mg by mouth every six (6) hours as needed for Nausea. Tries to alternate with ondansetron   Yes Historical Provider   albuterol sulfate SR (PROVENTIL SR) 4 mg ER tablet Take 4 mg by mouth daily as needed for Other (Wheezing). Tries to alternate with albuterol MDI when she doesn't need rapid resolution of symptoms   Yes Historical Provider   fluticasone-vilanterol (BREO ELLIPTA) 200-25 mcg/dose inhaler Take 1 Puff by inhalation daily.    Yes Historical Provider   norgestrel-ethinyl estradiol (CRYSELLE, Earl,) 0.3-30 mg-mcg tab Take 1 Tab by mouth daily. Yes Historical Provider   esomeprazole (NEXIUM) 20 mg capsule Take 20 mg by mouth daily. Yes Historical Provider   ondansetron (ZOFRAN ODT) 8 mg disintegrating tablet Take 8 mg by mouth every eight (8) hours as needed for Nausea. Tries to alternate with promethazine   Yes Historical Provider   methylnaltrexone (RELISTOR) 150 mg tab tablet Take 150 mg by mouth Daily (before breakfast). Yes Historical Provider   predniSONE (DELTASONE) 20 mg tablet Take 30 mg by mouth daily. Takes 1.5 of the 20 mg tab   Yes Historical Provider   morphine CR (MS CONTIN) 60 mg CR tablet Take 60 mg by mouth three (3) times daily. Yes Historical Provider   morphine IR (MS IR) 30 mg tablet Take 30 mg by mouth every four (4) hours as needed for Pain. Yes Historical Provider   albuterol (PROVENTIL HFA, VENTOLIN HFA, PROAIR HFA) 90 mcg/actuation inhaler Take 2 Puffs by inhalation every four (4) hours as needed for Wheezing. Yes Historical Provider   ALPRAZOLAM (XANAX PO) Take 1 mg by mouth three (3) times daily as needed. Yes Historical Provider   INFLIXIMAB (REMICADE IV) by IntraVENous route every six (6) weeks. Yes Historical Provider       Allergies/Social/Family History: Allergies   Allergen Reactions    Demerol [Meperidine] Unknown (comments)    Soma [Carisoprodol] Rash and Nausea Only    Sulfa (Sulfonamide Antibiotics) Unknown (comments)    Toradol [Ketorolac Tromethamine] Unknown (comments)      Social History   Substance Use Topics    Smoking status: Former Smoker    Smokeless tobacco: Not on file    Alcohol use No      Family History   Problem Relation Age of Onset    Hypertension Father     Stroke Father     Other Father      arthritis        Review of Systems:     Review of systems not obtained due to patient factors.     Objective:   Vital Signs:  Visit Vitals    /62    Pulse (!) 107    Temp 98.3 °F (36.8 °C)    Resp 19    Wt 143.4 kg (316 lb 1.6 oz)    LMP 05/21/2017 (Approximate)    SpO2 100%    Breastfeeding No    BMI 54.26 kg/m2    O2 Flow Rate (L/min): 1 l/min O2 Device: Endotracheal tube Temp (24hrs), Av.2 °F (36.8 °C), Min:97.4 °F (36.3 °C), Max:98.8 °F (37.1 °C)           Intake/Output:     Intake/Output Summary (Last 24 hours) at 17 1225  Last data filed at 17 0700   Gross per 24 hour   Intake          2859.07 ml   Output             3220 ml   Net          -360.93 ml       Physical Exam:   General:  Sedated, appears stated age. Head:  Normocephalic, without obvious abnormality, atraumatic. Eyes:  Conjunctivae/corneas clear. PERRL, EOMs intact. Neck: Supple, symmetrical, trachea midline, no adenopathy, thyroid: no enlargment/tenderness/nodules, no carotid bruit and no JVD. Lungs:   Clear to auscultation bilaterally. Chest wall:  No tenderness or deformity. Heart:  Regular rate and rhythm, S1, S2 normal, no murmur, click, rub or gallop. Abdomen:   Post op dressings   Extremities: Extremities normal, atraumatic, no cyanosis or edema. Pulses: 2+ and symmetric all extremities. Skin: Skin color, texture, turgor normal. No rashes or lesions   Neurologic: Sedated         LABS AND  DATA: Personally reviewed  Recent Labs      17   03317   WBC  28.4*  24.8*   HGB  9.6*  11.3*   HCT  30.6*  35.3   PLT  233  251     Recent Labs      17   0334  17   0519  17   0400   NA  141  142   --    K  3.2*  3.0*   --    CL  104  103   --    CO2     --    BUN  5*  4*   --    CREA  1.57*  1.08*   --    GLU  134*  101*   --    CA  7.9*  8.3*   --    MG  1.6   --   1.5*   PHOS  4.6   --   2.4*     Recent Labs      17   0519   SGOT  17   AP  80   TP  6.6   ALB  2.0*   GLOB  4.6*     No results for input(s): INR, PTP, APTT in the last 72 hours.     No lab exists for component: INREXT   Recent Labs      17   0138   PHI  7.380   PCO2I  43.7   PO2I  100   FIO2I  0.40     No results for input(s): CPK, CKMB, VARGHESE, BNPP in the last 72 hours. MEDS: Reviewed    Chest X-Ray: personally reviewed and report checked    EKG/ Tele      Thank you for allowing me to participate in this patient's care.     MD Erika Brooks MD, [facility]  [facility]

## 2017-06-29 NOTE — PROGRESS NOTES
Subjective  Pt still intubated   Not on pressors    Temp:  [97.4 °F (36.3 °C)-99.9 °F (37.7 °C)]   Pulse (Heart Rate):  []   BP: (104-185)/()   Resp Rate:  [10-22]   O2 Sat (%):  [91 %-100 %]   Weight:  [316 lb 1.6 oz (143.4 kg)]       06/27 1901 - 06/29 0700  In: 8525.7 [I.V.:8525.7]  Out: 7355 [Urine:2920; Drains:70]      Objective  Gen- Sedated  Lungs - Clear  H- Tachcardia   Abd- Soft   Ostomy productive   Drain serosanginous     Recent Results (from the past 24 hour(s))   CULTURE, WOUND W GRAM STAIN    Collection Time: 06/28/17 12:17 PM   Result Value Ref Range    Special Requests: NO SPECIAL REQUESTS      GRAM STAIN OCCASIONAL  WBCS SEEN        GRAM STAIN NO ORGANISMS SEEN      Culture result: PENDING    TYPE & SCREEN    Collection Time: 06/28/17  7:00 PM   Result Value Ref Range    Crossmatch Expiration 07/01/2017     ABO/Rh(D) B POSITIVE     Antibody screen NEG    POC G3 - PUL    Collection Time: 06/29/17  1:38 AM   Result Value Ref Range    FIO2 (POC) 0.40 %    pH (POC) 7.380 7.35 - 7.45      pCO2 (POC) 43.7 35.0 - 45.0 MMHG    pO2 (POC) 100 80 - 100 MMHG    HCO3 (POC) 25.8 22 - 26 MMOL/L    sO2 (POC) 98 (H) 92 - 97 %    Base excess (POC) 1 mmol/L    Site LEFT RADIAL      Device: VENT      Mode SIMV      Tidal volume 550 ml    Set Rate 10 bpm    PEEP/CPAP (POC) 5 cmH2O    Pressure support 10 cmH2O    Allens test (POC) N/A      Specimen type (POC) ARTERIAL      Volume control YES     METABOLIC PANEL, BASIC    Collection Time: 06/29/17  3:34 AM   Result Value Ref Range    Sodium 141 136 - 145 mmol/L    Potassium 3.2 (L) 3.5 - 5.1 mmol/L    Chloride 104 97 - 108 mmol/L    CO2 28 21 - 32 mmol/L    Anion gap 9 5 - 15 mmol/L    Glucose 134 (H) 65 - 100 mg/dL    BUN 5 (L) 6 - 20 MG/DL    Creatinine 1.57 (H) 0.55 - 1.02 MG/DL    BUN/Creatinine ratio 3 (L) 12 - 20      GFR est AA 44 (L) >60 ml/min/1.73m2    GFR est non-AA 37 (L) >60 ml/min/1.73m2    Calcium 7.9 (L) 8.5 - 10.1 MG/DL   CBC WITH AUTOMATED DIFF    Collection Time: 06/29/17  3:34 AM   Result Value Ref Range    WBC 28.4 (H) 3.6 - 11.0 K/uL    RBC 3.33 (L) 3.80 - 5.20 M/uL    HGB 9.6 (L) 11.5 - 16.0 g/dL    HCT 30.6 (L) 35.0 - 47.0 %    MCV 91.9 80.0 - 99.0 FL    MCH 28.8 26.0 - 34.0 PG    MCHC 31.4 30.0 - 36.5 g/dL    RDW 14.9 (H) 11.5 - 14.5 %    PLATELET 612 810 - 094 K/uL    NEUTROPHILS 83 (H) 32 - 75 %    BAND NEUTROPHILS 3 0 - 6 %    LYMPHOCYTES 10 (L) 12 - 49 %    MONOCYTES 2 (L) 5 - 13 %    EOSINOPHILS 0 0 - 7 %    BASOPHILS 0 0 - 1 %    METAMYELOCYTES 1 (H) 0 %    MYELOCYTES 1 (H) 0 %    ABS. NEUTROPHILS 24.4 (H) 1.8 - 8.0 K/UL    ABS. LYMPHOCYTES 2.8 0.8 - 3.5 K/UL    ABS. MONOCYTES 0.6 0.0 - 1.0 K/UL    ABS. EOSINOPHILS 0.0 0.0 - 0.4 K/UL    ABS. BASOPHILS 0.0 0.0 - 0.1 K/UL    RBC COMMENTS ANISOCYTOSIS  1+       MAGNESIUM    Collection Time: 06/29/17  3:34 AM   Result Value Ref Range    Magnesium 1.6 1.6 - 2.4 mg/dL   PHOSPHORUS    Collection Time: 06/29/17  3:34 AM   Result Value Ref Range    Phosphorus 4.6 2.6 - 4.7 MG/DL         Active Problems:    Abdominal pain (6/25/2017)          Assessment & Plan  S/p Attempted Exlap for dead bowel and SMA stent   As she was not able to be resected I would wait on extubating until it becomes more clear whether or not she has a survivable condition. Suspect elevated Cr.  Due to Contrast load- will follow  Cont abx   Keep NPO will start TPN  D/w PT.s mother     Hypokalemia and hypomagnesemia in the setting of crohn's disease treated with IV KCL and monitoring

## 2017-06-29 NOTE — ANESTHESIA POSTPROCEDURE EVALUATION
Post-Anesthesia Evaluation and Assessment    Patient: Amelia Pedro MRN: [id number]  SSN: [id number]    YOB: 1975  Age: 46 y.o. Sex: female       Cardiovascular Function/Vital Signs  Visit Vitals    /81    Pulse 91    Temp 37.1 °C (98.8 °F)    Resp 13    Wt 143.4 kg (316 lb 1.6 oz)    SpO2 100%    Breastfeeding No    BMI 54.26 kg/m2       Patient is status post general anesthesia for Procedure(s):  LAPAROTOMY EXPLORATORY BOWEL RESECTION, SMA STENT LEFT. Nausea/Vomiting: None    Postoperative hydration reviewed and adequate. Pain:  Pain Scale 1: FLACC (06/29/17 0055)  Pain Intensity 1: 0 (06/29/17 0055)   Managed    Neurological Status:   Neuro (WDL): Exceptions to WDL (06/29/17 0055)  Neuro  Neurologic State: Anesthetized (sedated/vented) (06/29/17 0055)   At baseline    Mental Status and Level of Consciousness: Arousable    Pulmonary Status:   O2 Device: Ventilator (06/29/17 0055)   Adequate oxygenation and airway patent    Complications related to anesthesia: None    Post-anesthesia assessment completed.  No concerns    Signed By: Enid Whipple MD     June 29, 2017

## 2017-06-29 NOTE — PROGRESS NOTES
Vascular Surgery  --assisted intra-operatively with abdominal exploration and discussed with general surgery team  --unable to treat sma thrombus from within the abdomen   --discussed with family re: endovascular attempt at revascularization via left brachial artery approach

## 2017-06-29 NOTE — OP NOTES
[facility]   [address] [facility], [address] [place] [address]   OP NOTE       Name:  Emely Stahl   MR#:  [id number]   :  1975   Account #:  [de-identified]    Surgery Date:  2017   Date of Adm:  2017       PREOPERATIVE DIAGNOSIS: Ischemic bowel. POSTOPERATIVE DIAGNOSIS: Ischemic bowel. PROCEDURES PERFORMED:     1. Exploratory laparotomy with extensive lysis of adhesions greater   than 2 hours, repair of enterotomy. 2. SMA stent on the left. Dr. Carol Laura will dictate the SMA stent on the left. SURGEON: Osman Cabral MD    ASSISTANT   1. Alessandra Vazquez MD   2. Dr. Gloria Cunningham. ESTIMATED BLOOD LOSS: 200    SPECIMENS REMOVED: none    ANESTHESIA:  General.     CLINICAL INDICATIONS: The patient is a 82-year-old female who   had undergone an exploratory approximately 3 weeks ago. This   required 7 hours of lysis of adhesions. The patient came back to the   hospital complaining of abdominal pain. The pain persisted and the   patient underwent a CT scan today that was concerning for dead   bowel. She, therefore, comes to the operating room today for   exploratory laparotomy. On the CT scan itself, it did show a clot in the   SMA and, therefore, consultation was obtained by Dr. Carol Laura. DESCRIPTION OF PROCEDURE: Informed consent was obtained. The   patient was brought to the operating room and placed on the operating   room table in the supine position. General endotracheal anesthesia was   then established. Her ostomy appliance was removed and this was   covered. She was then prepped and draped in the usual sterile fashion. A skin incision was then made through her previous incision. Once   through her previous incision, we began to dissect down onto the   fascia. We tried first from above and then we extended our incision and   started below.  During the course of this, we were bluntly dissecting in   the mid portion of her abdomen when we entered into her bowel. This   was done bluntly. The bowel in this section appeared to be somewhat   ischemic. However, we continued to try to get into the peritoneal cavity   itself. We slowly and methodically began to try to dissect the abdominal   wall off of the peritoneum. However, all we managed to do was to get   the posterior peritoneum that was closely associated with the bowel   down off of the subcutaneous tissues and the fascia. We were able to   slowly work superior and were able to identify the stomach, the   duodenum and gallbladder, however, trying to go inferior to this was a   wall of adhesion without plane. There was no way to get into the   abdomen itself. We tried for the next approximately 2 hours to enter the   peritoneal cavity in order to identify the area of bowel ischemia,   however, at no point, were we able to free the bowel up or enter the   peritoneal cavity enough in order to do this. After much consultation, the decision was made at that time, as there   appeared to be no safe way or any way at all to enter into the   peritoneal cavity, to abandon. The enterotomy was then closed using 3-  0 Vicryl suture. Over this, a flap of tissue was placed over it and this   was secured using 3-0 Vicryl suture. The fascia was then attempted to   be closed using interrupted #1 PDS. The abdomen itself was quite   tight. It could not be completely closed. Prior to closing, a drain was   placed over the area of the enterotomy. Of note, there were other   serosal tears during the procedure, which were closed, but no other   enterotomies. The drain was brought out into the right lower quadrant   and secured using 2-0 nylon suture. The subcutaneous tissues were   then closed to ensure that there was good seal on the drain, then the   skin was closed using staples and 2-0 nylon in a horizontal mattress   fashion. Dressings and a new ostomy appliance was then applied.      The patient then was turned over to the care of Dr. Claire George to   perform SMA angiogram and stent in hopes that the increased blood   flow will help this ischemic piece of bowel.          MD DIMAS Huynh / Felicita   D:  06/29/2017   00:10   T:  06/29/2017   15:18   Job #:  [id number]

## 2017-06-29 NOTE — PROGRESS NOTES
PCCM    Full consult note to follow  Post op pulmonary insufficiency  Ischemic bowel, unresectable, S/P SMA stent, prognosis unclear at present  SBT when cleared by surgery  Supportive care  TPN per surgery    Leona Kendall MD

## 2017-06-29 NOTE — PERIOP NOTES
TRANSFER - OUT REPORT:    Verbal report given to denae on Honorio Matamoros  being transferred to icu 19(unit) for routine post - op       Report consisted of patients Situation, Background, Assessment and   Recommendations(SBAR). Time Pre op antibiotic given:2 grams vanc 1915,3.375 zosyn 1915  Anesthesia Stop time: 0104  Reilly Present on Transfer to floor:y  Order for Reilly on Chart:y    Information from the following report(s) SBAR, OR Summary, Procedure Summary, Intake/Output, MAR, Recent Results and Cardiac Rhythm nsr was reviewed with the receiving nurse. Opportunity for questions and clarification was provided. Is the patient on 02? YES- vent        L/Min        Other   Is the patient on a monitor? YES    Is the nurse transporting with the patient? YES    Surgical Waiting Area notified of patient's transfer from PACU? YES      The following personal items collected during your admission accompanied patient upon transfer:   Dental Appliance: Dental Appliances: None  Vision: Visual Aid: Glasses, With patient  Hearing Aid:    Chin Noriega: Jewelry: Body Piercing, Earrings, Ring, With patient, Other (comment) (Bilat nipple rings, tongue ring, nose ring)  Clothing: Clothing: None  Other Valuables:  Other Valuables: Cell Phone  Valuables sent to safe: Personal Items Sent to Safe: None

## 2017-06-29 NOTE — PROGRESS NOTES
Vascular Surgery  --no new events post-op overnight  --left arm looks good; palpable radial pulse  --if there are no objections, I am ordering plavix to be given per NGT

## 2017-06-29 NOTE — BRIEF OP NOTE
BRIEF OPERATIVE NOTE    Date of Procedure: 6/28/2017   Preoperative Diagnosis: DEAD BOWEL  Postoperative Diagnosis: DEAD BOWEL    Procedure(s):  LAPAROTOMY EXPLORATORY BOWEL RESECTION, SMA STENT LEFT  Surgeon(s) and Role:     * Zenaida Jordan MD - Primary     * Elvia Frank MD - Assisting     * Emma Reyes MD - Assisting         Assistant Staff:       Surgical Staff:  Circ-1: Shalini Persaud RN  Circ-Relief: Meghan Feng RN  Scrub Tech-Relief: Gila Max  Scrub RN-1: Howard Jonas RN  Event Time In   Incision Start 1918   Incision Close 0000     Anesthesia: General   Estimated Blood Loss: 200  Specimens: * No specimens in log *   Findings: frozen abdomen   Complications: enterotomy   Implants:   Implant Name Type Inv.  Item Serial No.  Lot No. LRB No. Used Action   STENT BILI EXP BLLN [id number] -- VISI-PRO - SN/A   STENT BILI EXP BLLN [id number] -- VISI-PRO N/A [employer] COVIDIEN PV3 PV-NV [id number] Left 1 Implanted

## 2017-06-29 NOTE — PROGRESS NOTES
TRANSFER - IN REPORT:    Verbal report received from shakeel(name) on Wayne Points  being received from pacu(unit) for routine progression of care      Report consisted of patients Situation, Background, Assessment and   Recommendations(SBAR). Information from the following report(s) SBAR, Kardex and MAR was reviewed with the receiving nurse. Opportunity for questions and clarification was provided. Assessment completed upon patients arrival to unit and care assumed.

## 2017-06-29 NOTE — PROGRESS NOTES
[address]  [address], [address]       GI PROGRESS NOTE  Gaetano Bell, [facility]  [phone] office  [phone] NP in-hospital cell phone M-F until 4:30  After 5pm or on weekends, please call  for physician on call      NAME: Haja Kohli   :  1975   MRN:  [id number]       Subjective:     Overnight events noted; surgery unsure if survivable condition at this time    Objective:     VITALS:   Last 24hrs VS reviewed since prior progress note. Most recent are:  Visit Vitals    /62    Pulse (!) 107    Temp 98.3 °F (36.8 °C)    Resp 19    Wt 143.4 kg (316 lb 1.6 oz)    SpO2 100%    Breastfeeding No    BMI 54.26 kg/m2       PHYSICAL EXAM:  General: Intubated and sedated   Neurologic:  Sedated, 50 mcg/kg/min propofol and 200 mg/hr fentanyl  HEENT: EOMI. Lungs:  A/c 30% peep 5, RR 17, set rate 10.  Intubated, coarse rhonchi throughout  Heart:  ST, S1 S2, no murmur   Abdomen: Pt grimaces/moves with light palpation, incision covered, CHRIS with serosanguinous drainage, productive ostomy with green output  Extremities: No edema  Psych:   WES    Lab Data Reviewed:     Recent Results (from the past 24 hour(s))   CULTURE, WOUND W GRAM STAIN    Collection Time: 17 12:17 PM   Result Value Ref Range    Special Requests: NO SPECIAL REQUESTS      GRAM STAIN OCCASIONAL  WBCS SEEN        GRAM STAIN NO ORGANISMS SEEN      Culture result: NO GROWTH AFTER 21 HOURS     TYPE & SCREEN    Collection Time: 17  7:00 PM   Result Value Ref Range    Crossmatch Expiration 2017     ABO/Rh(D) B POSITIVE     Antibody screen NEG    POC CHEM8    Collection Time: 17  8:41 PM   Result Value Ref Range    Calcium, ionized (POC) 1.08 (L) 1.12 - 1.32 MMOL/L    Sodium (POC) 139 136 - 145 MMOL/L    Potassium (POC) 3.3 (L) 3.5 - 5.1 MMOL/L    Chloride (POC) 98 98 - 107 MMOL/L    CO2 (POC) 26 21 - 32 MMOL/L    Anion gap (POC) 19 (H) 5 - 15 mmol/L    Glucose (POC) 108 (H) 65 - 100 MG/DL    BUN (POC) 4 (L) 9 - 20 MG/DL    Creatinine (POC) 1.5 (H) 0.6 - 1.3 MG/DL    GFRAA, POC 47 (L) >60 ml/min/1.73m2    GFRNA, POC 39 (L) >60 ml/min/1.73m2    Hemoglobin (POC) 10.5 (L) 11.5 - 16.0 GM/DL    Hematocrit (POC) 31 (L) 35.0 - 47.0 %    Comment Notified RN or MD immediately by      POC G3 - PUL    Collection Time: 06/29/17  1:38 AM   Result Value Ref Range    FIO2 (POC) 0.40 %    pH (POC) 7.380 7.35 - 7.45      pCO2 (POC) 43.7 35.0 - 45.0 MMHG    pO2 (POC) 100 80 - 100 MMHG    HCO3 (POC) 25.8 22 - 26 MMOL/L    sO2 (POC) 98 (H) 92 - 97 %    Base excess (POC) 1 mmol/L    Site LEFT RADIAL      Device: VENT      Mode SIMV      Tidal volume 550 ml    Set Rate 10 bpm    PEEP/CPAP (POC) 5 cmH2O    Pressure support 10 cmH2O    Allens test (POC) N/A      Specimen type (POC) ARTERIAL      Volume control YES     METABOLIC PANEL, BASIC    Collection Time: 06/29/17  3:34 AM   Result Value Ref Range    Sodium 141 136 - 145 mmol/L    Potassium 3.2 (L) 3.5 - 5.1 mmol/L    Chloride 104 97 - 108 mmol/L    CO2 28 21 - 32 mmol/L    Anion gap 9 5 - 15 mmol/L    Glucose 134 (H) 65 - 100 mg/dL    BUN 5 (L) 6 - 20 MG/DL    Creatinine 1.57 (H) 0.55 - 1.02 MG/DL    BUN/Creatinine ratio 3 (L) 12 - 20      GFR est AA 44 (L) >60 ml/min/1.73m2    GFR est non-AA 37 (L) >60 ml/min/1.73m2    Calcium 7.9 (L) 8.5 - 10.1 MG/DL   CBC WITH AUTOMATED DIFF    Collection Time: 06/29/17  3:34 AM   Result Value Ref Range    WBC 28.4 (H) 3.6 - 11.0 K/uL    RBC 3.33 (L) 3.80 - 5.20 M/uL    HGB 9.6 (L) 11.5 - 16.0 g/dL    HCT 30.6 (L) 35.0 - 47.0 %    MCV 91.9 80.0 - 99.0 FL    MCH 28.8 26.0 - 34.0 PG    MCHC 31.4 30.0 - 36.5 g/dL    RDW 14.9 (H) 11.5 - 14.5 %    PLATELET 936 338 - 631 K/uL    NEUTROPHILS 83 (H) 32 - 75 %    BAND NEUTROPHILS 3 0 - 6 %    LYMPHOCYTES 10 (L) 12 - 49 %    MONOCYTES 2 (L) 5 - 13 %    EOSINOPHILS 0 0 - 7 %    BASOPHILS 0 0 - 1 %    METAMYELOCYTES 1 (H) 0 %    MYELOCYTES 1 (H) 0 %    ABS. NEUTROPHILS 24.4 (H) 1.8 - 8.0 K/UL    ABS. LYMPHOCYTES 2.8 0.8 - 3.5 K/UL    ABS. MONOCYTES 0.6 0.0 - 1.0 K/UL    ABS. EOSINOPHILS 0.0 0.0 - 0.4 K/UL    ABS. BASOPHILS 0.0 0.0 - 0.1 K/UL    RBC COMMENTS ANISOCYTOSIS  1+       MAGNESIUM    Collection Time: 06/29/17  3:34 AM   Result Value Ref Range    Magnesium 1.6 1.6 - 2.4 mg/dL   PHOSPHORUS    Collection Time: 06/29/17  3:34 AM   Result Value Ref Range    Phosphorus 4.6 2.6 - 4.7 MG/DL         ________________________________________________________________________       Assessment:   · Ischemic bowel: unresectable, SMA stented  · Crohn's disease     Patient Active Problem List   Diagnosis Code    Crohn's disease ([facility] [address] K50.90    DVT (deep venous thrombosis) ([facility] [address] I82.409    Incarcerated ventral hernia K46.0    Right flank pain R10.9    Perforated bowel ([address] K63.1    Abdominal pain R10.9     Plan:   · Plan per surgery  · Following     Signed By: Nahid Tripp. Jayson Glover NP     6/29/2017  10:37 AM         GI Attending: Patient seen and examined. Discussed guarded condition with family. Will follow. Cain Michel MD

## 2017-06-29 NOTE — ROUTINE PROCESS
Received verbal bedside report from denae girard using sbar - alarms  And vent checked  1030 Interdisciplinary team rounds were held 6/29/2017 with the following team members:Care Management, Nursing, Pharmacy, Physician and Respiratory Therapy and the patient. Plan of care discussed. See clinical pathway and/or care plan for interventions and desired outcomes. Bedside and Verbal shift change report given to denae girard (oncoming nurse) by Moses Nielson (offgoing nurse).  Report included the following information Kardex, MAR and Cardiac Rhythm st.

## 2017-06-29 NOTE — PROGRESS NOTES
NUTRITION COMPLETE ASSESSMENT    RECOMMENDATIONS:   Add 100 mg Thiamine to TPN  Start TPN @ 42 ml/hr and advance gradually to goal 83 ml/hr  Suggested TPN solution: 5% AA D10    *Once pt requiring less Diprivan-increase dextrose concentration in TPN     Interventions/Plan:   Follow    Assessment:   Reason for Assessment:   [x] Provider Consult: TPN recommendations    Diet: NPO  Nutritionally Significant Medications: [x] Reviewed & Includes: Diprivan @ 43 ml/hr, Fentanyl, KCL, NS @ 125 ml/hr,  Meal Intake: Patient Vitals for the past 100 hrs:   % Diet Eaten   06/27/17 1600 0 %   06/27/17 1400 0 %   06/27/17 1000 0 %   06/26/17 1721 10 %   06/26/17 1405 0 %       Subjective:  Pt's father in the room-discussed plan for TPN; stated pt was diagnosed with Crohn's when she was 7years old. Objective:  Ms Clarisa Linares was admitted with Abdominal pain. Noted: 6/28: OR- Attempted ex lap/bowel resection for ischemic bowel; SMA stent (left) placed d/t frozen abdomen; uncertain if survivable condition; post-op pulmonary insufficiency-remains intubated. PMHx: Crohn's-s/p colectomy with end ileostomy, multiple bowel resections, others noted. Potassium replaced today; other lytes WNL. Monitor lytes daily with start of TPN. May be at refeeding risk-add 100 mg Thiamine to TPN. Creatinine elevated and trending up-? DEAN. Diprivan @ current rate will provide 103 gm fat and 1135 calories per day. May wish to consider alternative sedative to reduce total fat provided by Diprivan (will settle into fat stores-pt morbidly obese). Suggested TPN with current TPN rate: 5% AA D10 @ 83 ml/hr to provide 2000 ml, 100 gm protein and 1080 calories per day (2215 total calories including Diprivan). Start @ 42 ml/hr and gradually advance to goal of 83 ml/hr. Increase dextrose concentration prn once pt requiring less Diprivan.     Estimated Nutrition Needs:   Kcals/day: 9849 Kcals/day  Protein: 110 g (2.0g/kg IBW)  Fluid:  (1  ml/kcal)  Based On: [facility] ([id number])  Weight Used: Actual wt (143.4 kg)    Pt expected to meet estimated nutrient needs:  [x]   Yes via TPN    []  No  [] Unable to predict at this time      Nutrition Diagnosis:   1. Inadequate oral food/beverage intake related to unresectable ischemic bowel  as evidenced by NPO with plan for parenteral nutrition support. Goals:     TPN to meet at least 90% estimated needs in next 1-2 days. Monitoring & Evaluation:    - Enteral/parenteral nutrition intake   - Electrolyte and renal profile, Weight/weight change, GI profile     Previous Nutrition Goals Met:  N/A  Previous Recommendations:      N/A    Education & Discharge Needs:   [x] None Identified   [] Identified and addressed    [x] Participated in care plan, discharge planning, and/or interdisciplinary rounds        Cultural, Mandaen and ethnic food preferences identified:   None    Skin Integrity: []Intact  [x] midline abdominal incision with proximal dehiscence  Edema: []None [x] 1+, Trace BLE's  Last BM: 6/27  Food Allergies: [x]None []Other    Anthropometrics:    Weight Loss Metrics 6/29/2017 6/25/2017 6/25/2017 6/25/2017 6/22/2017 6/12/2017 6/6/2017   Today's Wt 316 lb 2.2 oz - 343 lb - 343 lb 343 lb 14.7 oz -   BMI - 54.27 kg/m2 - 58.88 kg/m2 58.88 kg/m2 - 59.03 kg/m2      Last 3 Recorded Weights in this Encounter    06/27/17 0809 06/27/17 1757 06/29/17 1414   Weight: 143.4 kg (316 lb 1.6 oz) 143.4 kg (316 lb 1.6 oz) 143.4 kg (316 lb 2.2 oz)      Weight Source: Standing scale (comment) (weight obtained 6/27)  Height: 5' 4\" (162.6 cm),    Body mass index is 54.27 kg/(m^2).   IBW : 54.4 kg (120 lb), % IBW (Calculated): 263.45 %   ,      Labs:  Lab Results   Component Value Date/Time    Sodium 141 06/29/2017 03:34 AM    Potassium 3.2 06/29/2017 03:34 AM    Chloride 104 06/29/2017 03:34 AM    CO2 28 06/29/2017 03:34 AM    Glucose 134 06/29/2017 03:34 AM    BUN 5 06/29/2017 03:34 AM    Creatinine 1.57 06/29/2017 03:34 AM    Calcium 7.9 06/29/2017 03:34 AM    Magnesium 1.6 06/29/2017 03:34 AM    Phosphorus 4.6 06/29/2017 03:34 AM    Albumin 2.0 06/28/2017 05:19 AM     No results found for: HBA1C, HGBE8, LEP6KCQV, MZA8SSHE  Lab Results   Component Value Date/Time    Glucose (POC) 108 06/28/2017 08:41 PM      Lab Results   Component Value Date/Time    ALT (SGPT) 28 06/28/2017 05:19 AM    AST (SGOT) 17 06/28/2017 05:19 AM    Alk.  phosphatase 80 06/28/2017 05:19 AM    Bilirubin, total 0.5 06/28/2017 05:19 AM        Danyel Coppola RD CNSC

## 2017-06-29 NOTE — CDMP QUERY
There is noted documentation of \"replete mag and potassium\" on the medical record. Could this be further specified as:     =>Hypokalemia and hypomagnesemia in the setting of crohn's disease treated with IV KCL and monitoring  =>Other Explanation of clinical findings  =>Unable to Determine (no explanation of clinical findings)    The medical record reflects the following clinical findings, treatment, and risk factors:    Risk Factors:   Clinical Indicators: K 3.0 Mag 1.3  6/26 SURG PN:   Active Problems:    Abdominal pain (6/25/2017)   \"Replete Mag and Potassium\"    Treatment: IV KCL; monitoring     Please clarify and document your clinical opinion in the progress notes and discharge summary including the definitive and/or presumptive diagnosis, (suspected or probable), related to the above clinical findings. Please include clinical findings supporting your diagnosis.     Thank Blake Duenas [facility]  [phone]

## 2017-06-29 NOTE — BRIEF OP NOTE
BRIEF OPERATIVE NOTE    Date of Procedure: 6/28/2017   Preoperative Diagnosis: DEAD BOWEL  Postoperative Diagnosis: DEAD BOWEL    Procedure(s):  LAPAROTOMY EXPLORATORY BOWEL RESECTION, SMA STENT LEFT  Surgeon(s) and Role:     * Jacob Travis MD - Primary     * Patric Farmer MD - Assisting     * Alie Veras MD - Assisting         Assistant Staff:       Surgical Staff:  Circ-1: Cameron Patel RN  Circ-Relief: Ze Murray RN  Scrub Tech-Relief: Ru Bach  Scrub RN-1: Star Schultz RN  Event Time In   Incision Start 1918   Incision Close 0000     Anesthesia: General   Estimated Blood Loss: 200xx  Specimens: * No specimens in log *   Findings: proximal sma stented with 6 x 37mm balloon expandable stent  Complications: ---  Implants:   Implant Name Type Inv.  Item Serial No.  Lot No. LRB No. Used Action   STENT BILI EXP BLLN [id number] -- VISI-PRO - SN/A   STENT BILI EXP BLLN [id number] -- [employer]IS[employer]PRO N/A [employer] COVIDIEN PV3 PV-NV [id number] Left 1 Implanted

## 2017-06-30 ENCOUNTER — APPOINTMENT (OUTPATIENT)
Dept: GENERAL RADIOLOGY | Age: 42
DRG: 335 | End: 2017-06-30
Attending: SURGERY
Payer: MEDICARE

## 2017-06-30 ENCOUNTER — APPOINTMENT (OUTPATIENT)
Dept: GENERAL RADIOLOGY | Age: 42
DRG: 335 | End: 2017-06-30
Attending: INTERNAL MEDICINE
Payer: MEDICARE

## 2017-06-30 LAB
ANION GAP BLD CALC-SCNC: 8 MMOL/L (ref 5–15)
BASOPHILS # BLD AUTO: 0 K/UL (ref 0–0.1)
BASOPHILS # BLD: 0 % (ref 0–1)
BUN SERPL-MCNC: 10 MG/DL (ref 6–20)
BUN/CREAT SERPL: 6 (ref 12–20)
CALCIUM SERPL-MCNC: 7.5 MG/DL (ref 8.5–10.1)
CHLORIDE SERPL-SCNC: 110 MMOL/L (ref 97–108)
CO2 SERPL-SCNC: 26 MMOL/L (ref 21–32)
CREAT SERPL-MCNC: 1.77 MG/DL (ref 0.55–1.02)
DIFFERENTIAL METHOD BLD: ABNORMAL
EOSINOPHIL # BLD: 0.5 K/UL (ref 0–0.4)
EOSINOPHIL NFR BLD: 2 % (ref 0–7)
ERYTHROCYTE [DISTWIDTH] IN BLOOD BY AUTOMATED COUNT: 15.2 % (ref 11.5–14.5)
GLUCOSE BLD STRIP.AUTO-MCNC: 128 MG/DL (ref 65–100)
GLUCOSE BLD STRIP.AUTO-MCNC: 144 MG/DL (ref 65–100)
GLUCOSE BLD STRIP.AUTO-MCNC: 150 MG/DL (ref 65–100)
GLUCOSE SERPL-MCNC: 139 MG/DL (ref 65–100)
HCT VFR BLD AUTO: 26.8 % (ref 35–47)
HGB BLD-MCNC: 8.4 G/DL (ref 11.5–16)
LACTATE SERPL-SCNC: 1.5 MMOL/L (ref 0.4–2)
LYMPHOCYTES # BLD AUTO: 10 % (ref 12–49)
LYMPHOCYTES # BLD: 2.7 K/UL (ref 0.8–3.5)
MAGNESIUM SERPL-MCNC: 1.7 MG/DL (ref 1.6–2.4)
MCH RBC QN AUTO: 29.1 PG (ref 26–34)
MCHC RBC AUTO-ENTMCNC: 31.3 G/DL (ref 30–36.5)
MCV RBC AUTO: 92.7 FL (ref 80–99)
MONOCYTES # BLD: 2.2 K/UL (ref 0–1)
MONOCYTES NFR BLD AUTO: 8 % (ref 5–13)
NEUTS BAND NFR BLD MANUAL: 3 % (ref 0–6)
NEUTS SEG # BLD: 21.8 K/UL (ref 1.8–8)
NEUTS SEG NFR BLD AUTO: 77 % (ref 32–75)
PHOSPHATE SERPL-MCNC: 2.6 MG/DL (ref 2.6–4.7)
PLATELET # BLD AUTO: 227 K/UL (ref 150–400)
PLATELET COMMENTS,PCOM: ABNORMAL
POTASSIUM SERPL-SCNC: 3 MMOL/L (ref 3.5–5.1)
RBC # BLD AUTO: 2.89 M/UL (ref 3.8–5.2)
RBC MORPH BLD: ABNORMAL
SERVICE CMNT-IMP: ABNORMAL
SODIUM SERPL-SCNC: 144 MMOL/L (ref 136–145)
WBC # BLD AUTO: 27.2 K/UL (ref 3.6–11)

## 2017-06-30 PROCEDURE — 3331090002 HH PPS REVENUE DEBIT

## 2017-06-30 PROCEDURE — 74011250636 HC RX REV CODE- 250/636: Performed by: SURGERY

## 2017-06-30 PROCEDURE — 74011250637 HC RX REV CODE- 250/637: Performed by: SURGERY

## 2017-06-30 PROCEDURE — 94003 VENT MGMT INPAT SUBQ DAY: CPT

## 2017-06-30 PROCEDURE — C9113 INJ PANTOPRAZOLE SODIUM, VIA: HCPCS | Performed by: SURGERY

## 2017-06-30 PROCEDURE — 82962 GLUCOSE BLOOD TEST: CPT

## 2017-06-30 PROCEDURE — 74011000250 HC RX REV CODE- 250: Performed by: NURSE PRACTITIONER

## 2017-06-30 PROCEDURE — 84100 ASSAY OF PHOSPHORUS: CPT | Performed by: NURSE PRACTITIONER

## 2017-06-30 PROCEDURE — 74011250636 HC RX REV CODE- 250/636: Performed by: INTERNAL MEDICINE

## 2017-06-30 PROCEDURE — 3331090001 HH PPS REVENUE CREDIT

## 2017-06-30 PROCEDURE — 74011250636 HC RX REV CODE- 250/636: Performed by: NURSE PRACTITIONER

## 2017-06-30 PROCEDURE — 65610000006 HC RM INTENSIVE CARE

## 2017-06-30 PROCEDURE — 80048 BASIC METABOLIC PNL TOTAL CA: CPT | Performed by: SURGERY

## 2017-06-30 PROCEDURE — 74000 XR ABD PORT  1 V: CPT

## 2017-06-30 PROCEDURE — 85025 COMPLETE CBC W/AUTO DIFF WBC: CPT | Performed by: NURSE PRACTITIONER

## 2017-06-30 PROCEDURE — 74011000258 HC RX REV CODE- 258: Performed by: INTERNAL MEDICINE

## 2017-06-30 PROCEDURE — 83735 ASSAY OF MAGNESIUM: CPT | Performed by: NURSE PRACTITIONER

## 2017-06-30 PROCEDURE — 36591 DRAW BLOOD OFF VENOUS DEVICE: CPT

## 2017-06-30 PROCEDURE — 74011250637 HC RX REV CODE- 250/637: Performed by: INTERNAL MEDICINE

## 2017-06-30 PROCEDURE — 74011000258 HC RX REV CODE- 258: Performed by: SURGERY

## 2017-06-30 PROCEDURE — 3E0436Z INTRODUCTION OF NUTRITIONAL SUBSTANCE INTO CENTRAL VEIN, PERCUTANEOUS APPROACH: ICD-10-PCS | Performed by: SURGERY

## 2017-06-30 PROCEDURE — 71010 XR CHEST PORT: CPT

## 2017-06-30 PROCEDURE — 74011000258 HC RX REV CODE- 258: Performed by: NURSE PRACTITIONER

## 2017-06-30 PROCEDURE — 83605 ASSAY OF LACTIC ACID: CPT | Performed by: INTERNAL MEDICINE

## 2017-06-30 PROCEDURE — 36415 COLL VENOUS BLD VENIPUNCTURE: CPT | Performed by: NURSE PRACTITIONER

## 2017-06-30 PROCEDURE — 74011000250 HC RX REV CODE- 250: Performed by: SURGERY

## 2017-06-30 PROCEDURE — 74011636637 HC RX REV CODE- 636/637: Performed by: SURGERY

## 2017-06-30 RX ORDER — MAGNESIUM SULFATE 100 %
4 CRYSTALS MISCELLANEOUS AS NEEDED
Status: DISCONTINUED | OUTPATIENT
Start: 2017-06-30 | End: 2017-08-24 | Stop reason: HOSPADM

## 2017-06-30 RX ORDER — DEXTROSE 50 % IN WATER (D50W) INTRAVENOUS SYRINGE
12.5-25 AS NEEDED
Status: DISCONTINUED | OUTPATIENT
Start: 2017-06-30 | End: 2017-06-30 | Stop reason: RX

## 2017-06-30 RX ORDER — POTASSIUM CHLORIDE 7.45 MG/ML
10 INJECTION INTRAVENOUS
Status: COMPLETED | OUTPATIENT
Start: 2017-06-30 | End: 2017-07-01

## 2017-06-30 RX ORDER — HYDROMORPHONE HYDROCHLORIDE 2 MG/ML
2 INJECTION, SOLUTION INTRAMUSCULAR; INTRAVENOUS; SUBCUTANEOUS
Status: DISCONTINUED | OUTPATIENT
Start: 2017-06-30 | End: 2017-07-01

## 2017-06-30 RX ORDER — LORAZEPAM 2 MG/ML
1 INJECTION INTRAMUSCULAR EVERY 12 HOURS
Status: DISCONTINUED | OUTPATIENT
Start: 2017-06-30 | End: 2017-07-03

## 2017-06-30 RX ORDER — VANCOMYCIN 1.75 GRAM/500 ML IN 0.9 % SODIUM CHLORIDE INTRAVENOUS
1750
Status: DISCONTINUED | OUTPATIENT
Start: 2017-07-02 | End: 2017-07-02 | Stop reason: DRUGHIGH

## 2017-06-30 RX ADMIN — PIPERACILLIN SODIUM,TAZOBACTAM SODIUM 3.38 G: 3; .375 INJECTION, POWDER, FOR SOLUTION INTRAVENOUS at 14:44

## 2017-06-30 RX ADMIN — CLOPIDOGREL BISULFATE 75 MG: 75 TABLET ORAL at 08:38

## 2017-06-30 RX ADMIN — PROPOFOL 50 MCG/KG/MIN: 10 INJECTION, EMULSION INTRAVENOUS at 03:55

## 2017-06-30 RX ADMIN — POTASSIUM CHLORIDE 10 MEQ: 10 INJECTION, SOLUTION INTRAVENOUS at 10:11

## 2017-06-30 RX ADMIN — VANCOMYCIN HYDROCHLORIDE 1500 MG: 10 INJECTION, POWDER, LYOPHILIZED, FOR SOLUTION INTRAVENOUS at 01:35

## 2017-06-30 RX ADMIN — POTASSIUM CHLORIDE 10 MEQ: 10 INJECTION, SOLUTION INTRAVENOUS at 08:34

## 2017-06-30 RX ADMIN — HYDROMORPHONE HYDROCHLORIDE 2 MG: 2 INJECTION, SOLUTION INTRAMUSCULAR; INTRAVENOUS; SUBCUTANEOUS at 23:27

## 2017-06-30 RX ADMIN — Medication 10 ML: at 15:10

## 2017-06-30 RX ADMIN — Medication 200 MCG/HR: at 09:08

## 2017-06-30 RX ADMIN — SODIUM CHLORIDE 100 MG: 900 INJECTION, SOLUTION INTRAVENOUS at 18:40

## 2017-06-30 RX ADMIN — HUMAN INSULIN 2 UNITS: 100 INJECTION, SOLUTION SUBCUTANEOUS at 19:04

## 2017-06-30 RX ADMIN — POTASSIUM CHLORIDE 10 MEQ: 10 INJECTION, SOLUTION INTRAVENOUS at 12:48

## 2017-06-30 RX ADMIN — HYDROMORPHONE HYDROCHLORIDE 2 MG: 2 INJECTION, SOLUTION INTRAMUSCULAR; INTRAVENOUS; SUBCUTANEOUS at 19:05

## 2017-06-30 RX ADMIN — Medication 200 MCG/HR: at 18:33

## 2017-06-30 RX ADMIN — CALCIUM GLUCONATE: 94 INJECTION, SOLUTION INTRAVENOUS at 18:38

## 2017-06-30 RX ADMIN — PROPOFOL 50 MCG/KG/MIN: 10 INJECTION, EMULSION INTRAVENOUS at 01:35

## 2017-06-30 RX ADMIN — CHLORHEXIDINE GLUCONATE 15 ML: 1.2 RINSE ORAL at 08:37

## 2017-06-30 RX ADMIN — HYDROMORPHONE HYDROCHLORIDE 2 MG: 2 INJECTION, SOLUTION INTRAMUSCULAR; INTRAVENOUS; SUBCUTANEOUS at 15:08

## 2017-06-30 RX ADMIN — DIPHENHYDRAMINE HYDROCHLORIDE 12.5 MG: 50 INJECTION, SOLUTION INTRAMUSCULAR; INTRAVENOUS at 18:30

## 2017-06-30 RX ADMIN — HYDROMORPHONE HYDROCHLORIDE 2 MG: 2 INJECTION, SOLUTION INTRAMUSCULAR; INTRAVENOUS; SUBCUTANEOUS at 12:48

## 2017-06-30 RX ADMIN — LORAZEPAM 1 MG: 2 INJECTION INTRAMUSCULAR; INTRAVENOUS at 12:48

## 2017-06-30 RX ADMIN — Medication 200 MCG/HR: at 04:26

## 2017-06-30 RX ADMIN — Medication 200 MCG/HR: at 13:49

## 2017-06-30 RX ADMIN — ENOXAPARIN SODIUM 40 MG: 40 INJECTION SUBCUTANEOUS at 21:34

## 2017-06-30 RX ADMIN — HYDROMORPHONE HYDROCHLORIDE 2 MG: 2 INJECTION, SOLUTION INTRAMUSCULAR; INTRAVENOUS; SUBCUTANEOUS at 17:09

## 2017-06-30 RX ADMIN — HYDROMORPHONE HYDROCHLORIDE 2 MG: 2 INJECTION, SOLUTION INTRAMUSCULAR; INTRAVENOUS; SUBCUTANEOUS at 21:32

## 2017-06-30 RX ADMIN — Medication 10 ML: at 21:33

## 2017-06-30 RX ADMIN — PIPERACILLIN SODIUM,TAZOBACTAM SODIUM 3.38 G: 3; .375 INJECTION, POWDER, FOR SOLUTION INTRAVENOUS at 21:32

## 2017-06-30 RX ADMIN — SODIUM CHLORIDE 75 ML/HR: 900 INJECTION, SOLUTION INTRAVENOUS at 21:58

## 2017-06-30 RX ADMIN — Medication 10 ML: at 06:06

## 2017-06-30 RX ADMIN — SODIUM CHLORIDE 125 ML/HR: 900 INJECTION, SOLUTION INTRAVENOUS at 00:27

## 2017-06-30 RX ADMIN — PIPERACILLIN SODIUM,TAZOBACTAM SODIUM 3.38 G: 3; .375 INJECTION, POWDER, FOR SOLUTION INTRAVENOUS at 06:06

## 2017-06-30 RX ADMIN — PROPOFOL 50 MCG/KG/MIN: 10 INJECTION, EMULSION INTRAVENOUS at 08:31

## 2017-06-30 RX ADMIN — SODIUM CHLORIDE 125 ML/HR: 900 INJECTION, SOLUTION INTRAVENOUS at 08:31

## 2017-06-30 RX ADMIN — HUMAN INSULIN 2 UNITS: 100 INJECTION, SOLUTION SUBCUTANEOUS at 22:00

## 2017-06-30 RX ADMIN — POTASSIUM CHLORIDE 10 MEQ: 10 INJECTION, SOLUTION INTRAVENOUS at 11:44

## 2017-06-30 RX ADMIN — PROPOFOL 50 MCG/KG/MIN: 10 INJECTION, EMULSION INTRAVENOUS at 06:06

## 2017-06-30 RX ADMIN — Medication 200 MCG/HR: at 23:20

## 2017-06-30 RX ADMIN — Medication 10 ML: at 06:07

## 2017-06-30 RX ADMIN — SODIUM CHLORIDE 40 MG: 9 INJECTION INTRAMUSCULAR; INTRAVENOUS; SUBCUTANEOUS at 08:38

## 2017-06-30 NOTE — PROGRESS NOTES
PULMONARY/CRITICAL CARE/SLEEP MEDICINE    Initial Physician Consultation Note    Name: Niitn Astorga   : 1975   MRN: [id number]   Date: 2017      Subjective:       Seen and examined. No pressors. Lactate 1.5. CXR clear. Creat 1.77, non-oliguric    Consult Note:      This patient has been seen and evaluated as a new patient to the ICU. Medical records and data reviewed. Patient is a 46 y.o. female who has been admitted to the ICU with post op pulmonary insufficiency after laparotomy and attempted resecition of ischemic bowel. Intraop findings showed frozen abd and SMA stent by subclavian approach was placed. It is unclear if endovascular treatment has resulted in reperfusion. She is hemodynamically stable and is chronically on large doses of opiates for pain management followed by Palliative Medicine. Assessment:   Postop pulmonary insufficiency  Ischemic bowel, unresectable, S/P SMA stent  DEAN- non-oliguric  Opiate dependence  Crohn's disease, H/O repeated abd surgeries  Hypokalemia  Other medical problems per chart      Recommendations:     VACV-- SBT when cleared by surgery  Prognosis is unclear at this time and surgery recommends continuing mechanical ventilation  On abx  Replace electrolytes  Vent bundle  TORIBIO and DVT prophylaxis  Post op care per surgery  Pain control per Palliative Medicine  For TPN  CXR for ETT position  Monitor renal function  AM labs including lactate  Critically ill, at risk for further decline.    Mother at bedside had no questions of me      Active Problem List:     Problem List  Date Reviewed: 2017          Codes Class    Abdominal pain ICD-10-CM: R10.9  ICD-9-CM: 789.00         Perforated bowel ([facility] ICD-10-CM: K63.1  ICD-9-CM: 569.83         Right flank pain ICD-10-CM: R10.9  ICD-9-CM: 789.09         Crohn's disease ([facility] ICD-10-CM: K50.90  ICD-9-CM: 555.9         DVT (deep venous thrombosis) ([facility] ICD-10-CM: I82.409  ICD-9-CM: 453.40 Overview Signed 8/15/2011  2:08 PM by David Ortega LPN     76             Incarcerated ventral hernia ICD-10-CM: K46.0  ICD-9-CM: 552.20     Overview Signed 8/15/2011  2:30 PM by David Ortega LPN     History of                   Past Medical History:      has a past medical history of Crohn's disease ([facility] (8/15/2011); DVT (deep venous thrombosis) ([facility] (8/15/2011); Incarcerated ventral hernia (8/15/2011); Right flank pain (8/22/2011); Seizures ([facility] and Stroke ([facility] Past Surgical History:      has a past surgical history that includes other surgical and lap, incisional hernia repair,incarcerated (9-10-08). Home Medications:     Prior to Admission medications    Medication Sig Start Date End Date Taking? Authorizing Provider   sucralfate (CARAFATE) 100 mg/mL suspension Take 1 g by mouth four (4) times daily. 6/23/17  Yes Quentin Vazquez MD   docusate sodium (COLACE) 100 mg capsule Take 100 mg by mouth daily. 6/22/17  Yes Alex Perez MD   lactobacillus comb no.10 (PROBIOTIC) 20 billion cell cap Take 1 Cap by mouth two (2) times a day. 6/22/17  Yes Alex Perez MD   HYDROmorphone (DILAUDID) 2 mg tablet Take 1 Tab by mouth every four (4) hours as needed. Max Daily Amount: 12 mg. 6/21/17  Yes Alex Perez MD   cyanocobalamin, vitamin B-12, 5,000 mcg TbDL Take 5,000 mcg by mouth Every Friday. Yes Historical Provider   promethazine (PHENERGAN) 25 mg tablet Take 25 mg by mouth every six (6) hours as needed for Nausea. Tries to alternate with ondansetron   Yes Historical Provider   albuterol sulfate SR (PROVENTIL SR) 4 mg ER tablet Take 4 mg by mouth daily as needed for Other (Wheezing). Tries to alternate with albuterol MDI when she doesn't need rapid resolution of symptoms   Yes Historical Provider   fluticasone-vilanterol (BREO ELLIPTA) 200-25 mcg/dose inhaler Take 1 Puff by inhalation daily. Yes Historical Provider   norgestrel-ethinyl estradiol (CRYSELLE, 28,) 0.3-30 mg-mcg tab Take 1 Tab by mouth daily. Yes Historical Provider   esomeprazole (NEXIUM) 20 mg capsule Take 20 mg by mouth daily. Yes Historical Provider   ondansetron (ZOFRAN ODT) 8 mg disintegrating tablet Take 8 mg by mouth every eight (8) hours as needed for Nausea. Tries to alternate with promethazine   Yes Historical Provider   methylnaltrexone (RELISTOR) 150 mg tab tablet Take 150 mg by mouth Daily (before breakfast). Yes Historical Provider   predniSONE (DELTASONE) 20 mg tablet Take 30 mg by mouth daily. Takes 1.5 of the 20 mg tab   Yes Historical Provider   morphine CR (MS CONTIN) 60 mg CR tablet Take 60 mg by mouth three (3) times daily. Yes Historical Provider   morphine IR (MS IR) 30 mg tablet Take 30 mg by mouth every four (4) hours as needed for Pain. Yes Historical Provider   albuterol (PROVENTIL HFA, VENTOLIN HFA, PROAIR HFA) 90 mcg/actuation inhaler Take 2 Puffs by inhalation every four (4) hours as needed for Wheezing. Yes Historical Provider   ALPRAZOLAM (XANAX PO) Take 1 mg by mouth three (3) times daily as needed. Yes Historical Provider   INFLIXIMAB (REMICADE IV) by IntraVENous route every six (6) weeks. Yes Historical Provider       Allergies/Social/Family History: Allergies   Allergen Reactions    Demerol [Meperidine] Unknown (comments)    Soma [Carisoprodol] Rash and Nausea Only    Sulfa (Sulfonamide Antibiotics) Unknown (comments)    Toradol [Ketorolac Tromethamine] Unknown (comments)      Social History   Substance Use Topics    Smoking status: Former Smoker    Smokeless tobacco: Not on file    Alcohol use No      Family History   Problem Relation Age of Onset    Hypertension Father     Stroke Father     Other Father      arthritis        Review of Systems:     Review of systems not obtained due to patient factors.     Objective:   Vital Signs:  Visit Vitals    /54    Pulse 95    Temp 99.2 °F (37.3 °C)    Resp 20    Ht 5' 4\" (1.626 m)    Wt 155.2 kg (342 lb 2.5 oz)    LMP 05/21/2017 (Approximate)    SpO2 100%    Breastfeeding No    BMI 58.73 kg/m2    O2 Flow Rate (L/min): 1 l/min O2 Device: Endotracheal tube Temp (24hrs), Av.8 °F (37.1 °C), Min:98.3 °F (36.8 °C), Max:99.2 °F (37.3 °C)           Intake/Output:     Intake/Output Summary (Last 24 hours) at 17 0739  Last data filed at 17 0700   Gross per 24 hour   Intake           6037.1 ml   Output             2090 ml   Net           3947.1 ml       Physical Exam:   General:  Sedated, appears stated age. Head:  Normocephalic, without obvious abnormality, atraumatic. Eyes:  Conjunctivae/corneas clear. PERRL, EOMs intact. Neck: Supple, symmetrical, trachea midline, no adenopathy, thyroid: no enlargment/tenderness/nodules, no carotid bruit and no JVD. Lungs:   Clear to auscultation bilaterally. Chest wall:  No tenderness or deformity. Heart:  Regular rate and rhythm, S1, S2 normal, no murmur, click, rub or gallop. Abdomen:   Post op dressings   Extremities: Extremities normal, atraumatic, no cyanosis or edema. Pulses: 2+ and symmetric all extremities. Skin: Skin color, texture, turgor normal. No rashes or lesions   Neurologic: Sedated         LABS AND  DATA: Personally reviewed  Recent Labs      17   0433  17   0334   WBC  27.2*  28.4*   HGB  8.4*  9.6*   HCT  26.8*  30.6*   PLT  227  233     Recent Labs      17   0433  17   0334   17   0400   NA  144  141   < >   --    K  3.0*  3.2*   < >   --    CL  110*  104   < >   --    CO2  26  28   < >   --    BUN  10  5*   < >   --    CREA  1.77*  1.57*   < >   --    GLU  139*  134*   < >   --    CA  7.5*  7.9*   < >   --    MG   --   1.6   --   1.5*   PHOS   --   4.6   --   2.4*    < > = values in this interval not displayed. Recent Labs      17   0519   SGOT  17   AP  80   TP  6.6   ALB  2.0*   GLOB  4.6*     No results for input(s): INR, PTP, APTT in the last 72 hours.     No lab exists for component: INREXT, INREXT   Recent Labs 06/29/17   0138   PHI  7.380   PCO2I  43.7   PO2I  100   FIO2I  0.40     No results for input(s): CPK, CKMB, TROIQ, BNPP in the last 72 hours. MEDS: Reviewed    Chest X-Ray: personally reviewed and report checked    EKG/ Tele      Thank you for allowing me to participate in this patient's care.     MD Bibi Guillory MD, [facility]  [facility]

## 2017-06-30 NOTE — PROGRESS NOTES
ID Progress Note  2017    Subjective:     Events of last 24 hours noted--she is now on the vent in the ICU. Inoperable abdomen. Objective:     Antibiotics:  1. Vancomycin  2. Zosyn       Vitals:   Visit Vitals    /59 (BP 1 Location: Right arm, BP Patient Position: At rest)    Pulse (!) 104    Temp 97.9 °F (36.6 °C)    Resp 20    Ht 5' 4\" (1.626 m)    Wt 155.2 kg (342 lb 2.5 oz)    LMP 2017 (Approximate)    SpO2 93%    Breastfeeding No    BMI 58.73 kg/m2        Tmax:  Temp (24hrs), Av.9 °F (37.2 °C), Min:97.9 °F (36.6 °C), Max:99.2 °F (37.3 °C)      Exam:  Lungs:  clear to auscultation bilaterally  Heart:  regularly irregular rhythm  Abdomen:  abnormal findings:  tenderness moderate in the entire abdomen, hypoactive bowel sounds    Labs:      Recent Labs      17   0433  17   0334  17   0519   WBC  27.2*  28.4*  24.8*   HGB  8.4*  9.6*  11.3*   PLT  227  233  251   BUN  10  5*  4*   CREA  1.77*  1.57*  1.08*   SGOT   --    --   17   AP   --    --   80   TBILI   --    --   0.5       Cultures:     Lab Results   Component Value Date/Time    Specimen Description: URINE 2009 07:45 PM     Lab Results   Component Value Date/Time    Culture result:  2017 12:17 PM     GRAM POSITIVE COCCI  IN CLUSTERS  SEEN ON GRAM STAIN OF THE THIO BROTH      Culture result: NO GROWTH 2 DAYS ON SOLID MEDIA 2017 12:17 PM    Culture result: NO GROWTH 3 DAYS 2017 02:35 PM       Radiology:     Line/Insert Date:           Assessment:     1. Ischemic gut with frozen abdomen  2. Leukocytosis--slight improvement  3. Off the vent--looks surprisingly good    Objective:     1. Continue IV therapy  2. Follow up cultures and studies  3.  Group will see over the weekend if asked    Taylor Schmitt MD

## 2017-06-30 NOTE — PROGRESS NOTES
Follow up visit with pt in 7119. Mother accompanied pt and reported that pt had just recently been extubated. Pt expressed desire to be able to go home and drink something. Provided empathic listening while leading patient and mother in processing. Pt indicated that prayers were what had gotten patient through this point and requested prayer from  which was provided at bedside. Mother shared that jalen tradition could be considered [facility] but not as strict.  advised of availability for support and encouraged patient and mother to reach out as needed. JUAN FRANCISCO De Anda. Khanh Del Rosario

## 2017-06-30 NOTE — ROUTINE PROCESS
0745 received verbal bedside report from  Damien [facility] Tiffany 1428- alarms checked  1100 Interdisciplinary team rounds were held 6/30/2017 with the following team members:Care Management, Nursing, Pharmacy, Physician and Respiratory Therapy and the patient and mother. Plan of care discussed. See clinical pathway and/or care plan for interventions and desired outcomes. Bedside and Verbal shift change report given to demian girard (oncoming nurse) by Herman Browne (offgoing nurse). Report included the following information SBAR, Kardex, Intake/Output, Recent Results and Cardiac Rhythm sr.

## 2017-06-30 NOTE — PROGRESS NOTES
Vascular Surgery  --findings noted  --have ordered plavix; left arm looks good; radial pulse is palpable  --hopefully she will improve  --will check on as needed; d/w mom and patient

## 2017-06-30 NOTE — PROGRESS NOTES
Day #6 of Vancomycin  Indication:  Post surgical site infection/SSTI   - S/p xlap w/lysis of adhesions. Now presenting with leukocytosis, S/Sx of infection, and mesenteric edema  - Ischemic bowel s/p attempt at resection on , ended up with SMA stent due to significant adhesions  Current regimen:  1.5 gm IV Q 24 hr  Abx regimen:  Eraxis + Zosyn + Vancomycin  ID Following ?: NO  Concomitant nephrotoxic drugs (requires more frequent monitoring): IV contrast agents  Frequency of BMP?: Daily  Recent Labs      17   0433  17   0334  17   0519   WBC  27.2*  28.4*  24.8*   CREA  1.77*  1.57*  1.08*   BUN  10  5*  4*     Est CrCl: ~60-80 ml/min; UO: ~0.3 ml/kg/hr  Temp (24hrs), Av °F (37.2 °C), Min:98.7 °F (37.1 °C), Max:99.2 °F (37.3 °C)    Cultures:   Abscess cx: light MRSA (Vanc JOY = 2), final   Blood: NGTD   Incisional wound: NGTD    Goal trough = 10 - 15 mcg/mL    Recent trough history (date/time/level/dose/action taken):   @ 0921 = 22.9 mcg/ml (drawn ~13.5 post-dose?) - dose adjusted from 2 gm IV Q 12 hr to Q 24 hr   @ 2019 = 21 mcg/ml (drawn ~48 hrs post-dose) - dose adjusted from 1.5 gm IV Q 24 hr to 1.75 gm IV Q 48 hr    Plan:  The level last night was a 48-hr level, not a 24-hr level. Therefore I will further adjust the dose to 1.75 gm IV Q 48 hr. The patient received a dose of 1.5 gm this am at 0135. Depending on renal function, we may recheck a level prior to the next dose I am scheduling for  morning at 0200. Pharmacy will continue to monitor patient daily and will make dosage adjustments based upon changing renal function.

## 2017-06-30 NOTE — ROUTINE PROCESS
0800 assumed care of pt  0840 Dr Giovanny Smith in  [address] Dr Jhoana Londono in  1030 diprivan decreased to 25 mcg  1100 diprivan off  1130 pt extubated and placed on nasal cannula - ngt curled up in throat post extubation - replaced and kub ordered  1300 Dr Sade Anderson in and saw the repeat KUB and said the ngt is in good position-and reconnected to suction- Dr Ryan Tony returned my call regarding PCA med and at present we will leave the fentanyl as is and used the dilaudid q 2 prn as needed  1600 pt vss still draining from ngt  1700 woodruff removed and purewick placed - pericare for scant amount of blood from vagina area  1900 shift summary: fentanyl remains with dilaudid q2 prn-has not voided yet since woodruff removed at 1700-vss- TPN infusing- required coverage once today for elevated blood glucose- ngt drainage still green- CHRIS drainage brownish green-ileos stoma pink in color

## 2017-06-30 NOTE — PROGRESS NOTES
[address]  174 [facility], [address]       GI PROGRESS NOTE  [facility]  [phone]    NAME: Edel Desouza   :  1975   MRN:  [id number]       Subjective:     Has pain, extubated this am.  NGT putting out green liquid, CHRIS with brown output. Ostomy still drainaing    Objective:     VITALS:   Last 24hrs VS reviewed since prior progress note. Most recent are:  Visit Vitals    /58    Pulse (!) 105    Temp 99.2 °F (37.3 °C)    Resp 22    Ht 5' 4\" (1.626 m)    Wt 155.2 kg (342 lb 2.5 oz)    SpO2 97%    Breastfeeding No    BMI 58.73 kg/m2       PHYSICAL EXAM:  General: Cooperative  Neurologic:  Alert and oriented X 3. HEENT: PERRLA, EOMI. Lungs:  Coarse post extubation  Heart:  s1 s2  Abdomen: Tender, distended, BS hypo  Extremities: edema  Psych:   Anxious     Lab Data Reviewed:     Recent Results (from the past 24 hour(s))   VANCOMYCIN, TROUGH    Collection Time: 17  8:19 PM   Result Value Ref Range    Vancomycin,trough 21.0 (HH) 5.0 - 10.0 ug/mL    Reported dose date: 2017      Reported dose time:       Reported dose: 2 GM UNITS   CBC WITH AUTOMATED DIFF    Collection Time: 17  4:33 AM   Result Value Ref Range    WBC 27.2 (H) 3.6 - 11.0 K/uL    RBC 2.89 (L) 3.80 - 5.20 M/uL    HGB 8.4 (L) 11.5 - 16.0 g/dL    HCT 26.8 (L) 35.0 - 47.0 %    MCV 92.7 80.0 - 99.0 FL    MCH 29.1 26.0 - 34.0 PG    MCHC 31.3 30.0 - 36.5 g/dL    RDW 15.2 (H) 11.5 - 14.5 %    PLATELET 753 647 - 718 K/uL    NEUTROPHILS 77 (H) 32 - 75 %    BAND NEUTROPHILS 3 0 - 6 %    LYMPHOCYTES 10 (L) 12 - 49 %    MONOCYTES 8 5 - 13 %    EOSINOPHILS 2 0 - 7 %    BASOPHILS 0 0 - 1 %    ABS. NEUTROPHILS 21.8 (H) 1.8 - 8.0 K/UL    ABS. LYMPHOCYTES 2.7 0.8 - 3.5 K/UL    ABS. MONOCYTES 2.2 (H) 0.0 - 1.0 K/UL    ABS. EOSINOPHILS 0.5 (H) 0.0 - 0.4 K/UL    ABS.  BASOPHILS 0.0 0.0 - 0.1 K/UL    DF MANUAL      PLATELET COMMENTS LARGE PLATELETS      RBC COMMENTS ANISOCYTOSIS  1+        RBC COMMENTS POLYCHROMASIA  1+        RBC COMMENTS OVALOCYTES  PRESENT        RBC COMMENTS TEARDROP CELLS  PRESENT       METABOLIC PANEL, BASIC    Collection Time: 06/30/17  4:33 AM   Result Value Ref Range    Sodium 144 136 - 145 mmol/L    Potassium 3.0 (L) 3.5 - 5.1 mmol/L    Chloride 110 (H) 97 - 108 mmol/L    CO2 26 21 - 32 mmol/L    Anion gap 8 5 - 15 mmol/L    Glucose 139 (H) 65 - 100 mg/dL    BUN 10 6 - 20 MG/DL    Creatinine 1.77 (H) 0.55 - 1.02 MG/DL    BUN/Creatinine ratio 6 (L) 12 - 20      GFR est AA 39 (L) >60 ml/min/1.73m2    GFR est non-AA 32 (L) >60 ml/min/1.73m2    Calcium 7.5 (L) 8.5 - 10.1 MG/DL   LACTIC ACID, PLASMA    Collection Time: 06/30/17  4:33 AM   Result Value Ref Range    Lactic acid 1.5 0.4 - 2.0 MMOL/L   MAGNESIUM    Collection Time: 06/30/17  4:33 AM   Result Value Ref Range    Magnesium 1.7 1.6 - 2.4 mg/dL   PHOSPHORUS    Collection Time: 06/30/17  4:33 AM   Result Value Ref Range    Phosphorus 2.6 2.6 - 4.7 MG/DL         ________________________________________________________________________       Assessment:   · Ischemic bowel s/p SMA stenting  · Crohn's Disease s/p ex lap     Patient Active Problem List   Diagnosis Code    Crohn's disease ([facility] K50.90    DVT (deep venous thrombosis) ([facility]) I82.409    Incarcerated ventral hernia K46.0    Right flank pain R10.9    Perforated bowel ([facility] K63.1    Abdominal pain R10.9     Plan:   · Supportive care  · Following     Signed By: Jeromy Rivera NP     6/30/2017  11:53 AM         GI Attending: Agree with above. Continue supportive measures per Surgery and ICU team. Will request colleagues on call to follow this weekend. Cain Gibbons MD

## 2017-06-30 NOTE — PROGRESS NOTES
Palliative Medicine Consult  [place]: [phone] ([phone])    Patient Name: Sweta Carpio  YOB: 1975    Date of Initial Consult: 6/27/17  Reason for Consult: overwhelming symptoms  Requesting Provider: Marybel Santana NP  Primary Care Physician: Julita Conrad MD      SUMMARY:   Sweta Carpio is a 46 y.o. with a past history of Crohn's disease,anxiety,  chronic pain, hx DVT, multiple (4) small bowel resections, s/p recent urgent dx lap, with lysis of adhesions, repair of suspected perf 6/7/17 , who was admitted on 6/25/2017 from home with a diagnosis of worsening abdominal pain, elevated WBC and MRSA wound infection. Initially consulted for pain management thought to be possible Crohn's flare. Initial CT on 6/25 w/out acute findings but repeat on 6/28 showing ischemic bowl. S/p ex lap but ischemic bowl unresectable, s/p SMA stent. Pt in ICU, intubated. Prognosis unclear. Interval History:  Extubated 6/30   PALLIATIVE DIAGNOSES:   1. Ischemic bowl- unresectable  2. Abd pain, acute on chronic   3. Shortness of breath   4. Nausea  5. Crohn's disease- just restarted on home budesonide   6. Chronic constipation on home relistor   7. MRSA wound      PLAN:   1. Agree with current pain management regimen per primary team  2. Pt responding well to the addition of the dilaudid to fentanyl gtt  3. Discussed with Lorrie Hoover RN    4. While my team initially consulted to help w/ PCA management, glad that we were involved now as pt's prognosis unclear. Surgery uncertain that pt can survive.    5. Will cont to follow for support, will touch base w/ surgery as more is known about pt's condition to see how my team can best assist.  6. Communicated plan of care with: Palliative IDT     GOALS OF CARE / TREATMENT PREFERENCES:   [====Goals of Care====]  GOALS OF CARE:  Patient / health care proxy stated goals: pain control  Recovery from acute issues      TREATMENT PREFERENCES:   Code Status: Full Code    Advance Care Planning:  Advance Care Planning 6/26/2017   Patient's Healthcare Decision Maker is: Legal Next of Camilo Jiménez   Primary Decision Maker Name Romi Loo   Primary Decision Maker Phone Number [phone]   Primary Decision Maker Relationship to Patient Parent   Confirm Advance Directive None   Patient Would Like to Complete Advance Directive No       Other:    The palliative care team has discussed with patient / health care proxy about goals of care / treatment preferences for patient.  [====Goals of Care====]         HISTORY:         HPI/SUBJECTIVE:    The patient is:   [] Verbal and participatory  [x] Non-participatory due to: Medical condition     6/30/17: pt somnolent. Pain control improved with addition of dilaudid 2mg iv q 2prn       Pt w/ emergent surgery yest, currently intubated and sedated. -----  Has chronic pain from crohns, well managed on current morphine doses at home. Allows her to function. She follows at [facility] with Dr. Peggy Velarde.      Clinical Pain Assessment (nonverbal scale for severity on nonverbal patients):   [++++ Clinical Pain Assessment++++]  [++++Pain Severity++++]: Pain: 2  [++++Pain Character++++]: sharp and burning, stabbing  [++++Pain Duration++++]: several days  [++++Pain Effect++++]: hard to walk, feeling anxious  [++++Pain Factors++++]: better after pain medicaion  [++++Pain Frequency++++]: constant  [++++Pain Location++++]: across abdomen both sides in middle  [++++ Clinical Pain Assessment++++]     FUNCTIONAL ASSESSMENT:     Palliative Performance Scale (PPS):  PPS: 30       PSYCHOSOCIAL/SPIRITUAL SCREENING:     Advance Care Planning:  Advance Care Planning 6/26/2017   Patient's Healthcare Decision Maker is: Legal Next of Camilo Jiménez   Primary Decision Maker Name Romi Loo   Primary Decision Maker Phone Number [phone]   Primary Decision Maker Relationship to Patient Parent   Confirm Advance Directive None   Patient Would Like to Complete Advance Directive No        Any spiritual / Pentecostalism concerns:  [] Yes /  [x] No    Caregiver Burnout:  [] Yes /  [x] No /  [] No Caregiver Present      Anticipatory grief assessment:   [x] Normal  / [] Maladaptive       ESAS Anxiety: Anxiety: 0    ESAS Depression: Depression: 0        REVIEW OF SYSTEMS:     Positive and pertinent negative findings in ROS are noted above in HPI. The following systems were [x] reviewed / [] unable to be reviewed as noted in HPI  Other findings are noted below. Systems: constitutional, ears/nose/mouth/throat, respiratory, gastrointestinal, genitourinary, musculoskeletal, integumentary, neurologic, psychiatric, endocrine. Positive findings noted below. Modified ESAS Completed by: provider   Fatigue: 10 Drowsiness: 4   Depression: 0 Pain: 2   Anxiety: 0 Nausea: 0   Anorexia: 0 Dyspnea: 0     Constipation: No              PHYSICAL EXAM:     From RN flowsheet:  Wt Readings from Last 3 Encounters:   06/30/17 342 lb 2.5 oz (155.2 kg)   06/25/17 343 lb (155.6 kg)   06/22/17 343 lb (155.6 kg)     Blood pressure 126/58, pulse 100, temperature 99.2 °F (37.3 °C), resp. rate 17, height 5' 4\" (1.626 m), weight 342 lb 2.5 oz (155.2 kg), last menstrual period 05/21/2017, SpO2 96 %, not currently breastfeeding.     Pain Scale 1: Adult Nonverbal Pain Scale  Pain Intensity 1: 0  Pain Onset 1: Chronic  Pain Location 1: Abdomen  Pain Orientation 1: Mid  Pain Description 1: Aching  Pain Intervention(s) 1: Medication (see MAR)  Last bowel movement, if known:     Constitutional: ill appearing, somnolent   Eyes: pupils equal, anicteric  ENMT: no nasal discharge, moist mucous membranes  Respiratory: breathing not labored,    Gastrointestinal: midline incision w/ dressing in place,  +ostomy w/out stool, hypoactive bowel sounds   Musculoskeletal: no deformity, no tenderness to palpation  Skin: warm, dry  No edema  Neurologic: non focal     HISTORY:     Active Problems:    Abdominal pain (6/25/2017)      Past Medical History: Diagnosis Date    Crohn's disease ([facility] 8/15/2011    DVT (deep venous thrombosis) ([facility] 8/15/2011    Incarcerated ventral hernia 8/15/2011    Right flank pain 8/22/2011    Seizures ([facility]     Stroke [facility])       Past Surgical History:   Procedure Laterality Date    HX OTHER SURGICAL      multiple procedures related to her Crohn's disease    [place] LAP, INCISIONAL HERNIA REPAIR,INCARCERATED  9-10-08    dr. Chilo Dobbins      Family History   Problem Relation Age of Onset    Hypertension Father     Stroke Father     Other Father      arthritis      History reviewed, no pertinent family history.   Social History   Substance Use Topics    Smoking status: Former Smoker    Smokeless tobacco: Not on file    Alcohol use No     Allergies   Allergen Reactions    Demerol [Meperidine] Unknown (comments)    Soma [Carisoprodol] Rash and Nausea Only    Sulfa (Sulfonamide Antibiotics) Unknown (comments)    Toradol [Ketorolac Tromethamine] Unknown (comments)      Current Facility-Administered Medications   Medication Dose Route Frequency    TPN ADULT - CENTRAL   IntraVENous CONTINUOUS    insulin regular (NOVOLIN R, HUMULIN R) injection   SubCUTAneous AC&HS    glucose chewable tablet 16 g  4 Tab Oral PRN    glucagon (GLUCAGEN) injection 1 mg  1 mg IntraMUSCular PRN    dextrose 10 % infusion 125-250 mL  125-250 mL IntraVENous PRN    LORazepam (ATIVAN) injection 1 mg  1 mg IntraVENous Q12H    HYDROmorphone (PF) (DILAUDID) injection 2 mg  2 mg IntraVENous Q2H PRN    [START ON 7/2/2017] vancomycin (VANCOCIN) 1750 mg in  ml infusion  1,750 mg IntraVENous Q48H    pantoprazole (PROTONIX) 40 mg in sodium chloride 0.9 % 10 mL injection  40 mg IntraVENous DAILY    0.9% sodium chloride infusion  75 mL/hr IntraVENous CONTINUOUS    albuterol-ipratropium (DUO-NEB) 2.5 MG-0.5 MG/3 ML  3 mL Nebulization Q6H PRN    chlorhexidine (PERIDEX) 0.12 % mouthwash 15 mL  15 mL Oral BID    clopidogrel (PLAVIX) tablet 75 mg  75 mg Per NG tube DAILY    prochlorperazine (COMPAZINE) with saline injection 5 mg  5 mg IntraVENous Q6H PRN    TPN ADULT - CENTRAL   IntraVENous CONTINUOUS    anidulafungin (ERAXIS) 100 mg in 0.9% sodium chloride 130 mL IVPB  100 mg IntraVENous Q24H    ondansetron (ZOFRAN) injection 8 mg  8 mg IntraVENous Q6H PRN    sodium chloride (NS) flush 5-10 mL  5-10 mL IntraVENous Q8H    sodium chloride (NS) flush 5-10 mL  5-10 mL IntraVENous PRN    propofol (DIPRIVAN) infusion  5-50 mcg/kg/min IntraVENous TITRATE    fentaNYL (PF) 900 mcg/30 ml infusion soln  0-200 mcg/hr IntraVENous TITRATE    sodium chloride (NS) flush 5-10 mL  5-10 mL IntraVENous Q8H    sodium chloride (NS) flush 5-10 mL  5-10 mL IntraVENous PRN    naloxone (NARCAN) injection 0.4 mg  0.4 mg IntraVENous PRN    diphenhydrAMINE (BENADRYL) injection 12.5 mg  12.5 mg IntraVENous Q4H PRN    enoxaparin (LOVENOX) injection 40 mg  40 mg SubCUTAneous Q24H    piperacillin-tazobactam (ZOSYN) 3.375 g in 0.9% sodium chloride (MBP/ADV) 100 mL  3.375 g IntraVENous Q8H    Vancomycin ---Pharmacy to Dose   Other Rx Dosing/Monitoring          LAB AND IMAGING FINDINGS:     Lab Results   Component Value Date/Time    WBC 27.2 06/30/2017 04:33 AM    HGB 8.4 06/30/2017 04:33 AM    PLATELET 317 95/45/1104 04:33 AM     Lab Results   Component Value Date/Time    Sodium 144 06/30/2017 04:33 AM    Potassium 3.0 06/30/2017 04:33 AM    Chloride 110 06/30/2017 04:33 AM    CO2 26 06/30/2017 04:33 AM    BUN 10 06/30/2017 04:33 AM    Creatinine 1.77 06/30/2017 04:33 AM    Calcium 7.5 06/30/2017 04:33 AM    Magnesium 1.7 06/30/2017 04:33 AM    Phosphorus 2.6 06/30/2017 04:33 AM      Lab Results   Component Value Date/Time    AST (SGOT) 17 06/28/2017 05:19 AM    Alk.  phosphatase 80 06/28/2017 05:19 AM    Protein, total 6.6 06/28/2017 05:19 AM    Albumin 2.0 06/28/2017 05:19 AM    Globulin 4.6 06/28/2017 05:19 AM     No results found for: INR, PTMR, PTP, PT1, PT2, APTT   No results found for: IRON, FE, TIBC, IBCT, PSAT, FERR   No results found for: PH, PCO2, PO2  No components found for: GLPOC   No results found for: CPK, CKMB             Total time: 25 min   Counseling / coordination time, spent as noted above: 15 min   > 50% counseling / coordination?: yes    Prolonged service was provided for  []30 min   []75 min in face to face time in the presence of the patient, spent as noted above. Time Start:   Time End:   Note: this can only be billed with 24321 (initial) or 06807 (follow up). If multiple start / stop times, list each separately.

## 2017-06-30 NOTE — PROGRESS NOTES
[address] [facility] Surgery    POD #2    Subjective     Intubated, sedated, no pressors, NPO, TPN, NGT to suction    Objective     Patient Vitals for the past 24 hrs:   Temp Pulse Resp BP SpO2   06/30/17 0826 - 85 20 - 100 %   06/30/17 0700 - 95 20 116/54 100 %   06/30/17 0600 - 91 18 106/46 99 %   06/30/17 0545 - 93 19 - 100 %   06/30/17 0500 - 93 20 116/51 100 %   06/30/17 0400 99.2 °F (37.3 °C) 93 21 115/51 100 %   06/30/17 0300 - 91 18 104/57 100 %   06/30/17 0200 - 96 14 114/54 100 %   06/30/17 0100 - 91 19 107/55 100 %   06/30/17 0045 - 98 18 - 100 %   06/30/17 0000 99.1 °F (37.3 °C) 88 18 108/46 99 %   06/29/17 2300 - 93 18 100/53 99 %   06/29/17 2200 - 100 17 109/50 100 %   06/29/17 2100 - (!) 101 19 116/59 100 %   06/29/17 2038 - (!) 104 26 - (!) 6 %   06/29/17 2000 99 °F (37.2 °C) 96 15 100/54 98 %   06/29/17 1900 - (!) 101 17 109/52 99 %   06/29/17 1800 - (!) 105 17 106/47 98 %   06/29/17 1700 - (!) 112 16 96/51 98 %   06/29/17 1600 98.7 °F (37.1 °C) (!) 124 19 132/50 97 %   06/29/17 1500 - (!) 123 22 122/72 -   06/29/17 1400 - (!) 116 16 110/54 -   06/29/17 1300 - (!) 118 19 113/46 100 %   06/29/17 1200 98.7 °F (37.1 °C) (!) 122 20 114/56 100 %   06/29/17 1130 - (!) 121 19 - 93 %   06/29/17 1100 - (!) 126 23 128/66 -   06/29/17 1030 - (!) 118 18 - 100 %   06/29/17 1000 - (!) 114 18 114/58 100 %         Date 06/29/17 0700 - 06/30/17 0659 06/30/17 0700 - 07/01/17 0659   Shift 0342-9546 4909-9482 24 Hour Total 6650-5439 6035-1690 24 Hour Total   I  N  T  A  K  E   I.V.  (mL/kg/hr) 2372.2  (1.3) 3261  (1.8) 5633.2  (1.5) 541.7  541. 7      Fentanyl Volume 80.4 93.8 174.2 6.7  6.7      Diprivan Volume 491.8 602 1093.8 43  43      Volume (0.9% sodium chloride infusion) 1500 1625 3125 250  250      Volume (potassium chloride 10 mEq in 100 ml IVPB) 300  300         Volume (anidulafungin (ERAXIS) 200 mg in 0.9% sodium chloride 260 mL IVPB)  260 260         Volume (piperacillin-tazobactam (ZOSYN) 3.375 g in 0.9% sodium chloride (MBP/ADV) 100 mL)  200 200 200  200      Volume (TPN ADULT - CENTRAL)  480.2 480.2 42  42    Shift Total  (mL/kg) 2372.2  (15.5) 3261  (21) 5633.2  (36.3) 541.7  (3.5)  541.7  (3.5)   O  U  T  P  U  T   Urine  (mL/kg/hr) 345  (0.2) 820  (0.4) 1165  (0.3) 100  100      Urine Output (mL) (Urinary Catheter 06/28/17 2- way; [place])  100  100    Emesis/NG output 200 600 800         Output (ml) (Nasogastric Tube 06/28/17) 200 600 800       Drains 25 60 85         Output (ml) (Jasbir-Ayala Drain 06/28/17 Abdomen) 25 60 85       Stool  0 0         Output (ml) (Ileostomy 06/07/00 Lower  Abdomen)  0 0       Shift Total  (mL/kg) 570  (3.7) 1480  (9.5) 2050  (13.2) 100  (0.6)  100  (0.6)   NET 1802.2 1781 3583.2 441.7  441. 7   Weight (kg) 152.8 155. 2 155. 2 155. 2 155. 2 155.2       PE  Pulm - CTAB  CV - RRR  Abd - soft, ND, BS present, incision c/d/i, CHRIS dark serous with particulate entericontents    Labs  Recent Results (from the past 12 hour(s))   CBC WITH AUTOMATED DIFF    Collection Time: 06/30/17  4:33 AM   Result Value Ref Range    WBC 27.2 (H) 3.6 - 11.0 K/uL    RBC 2.89 (L) 3.80 - 5.20 M/uL    HGB 8.4 (L) 11.5 - 16.0 g/dL    HCT 26.8 (L) 35.0 - 47.0 %    MCV 92.7 80.0 - 99.0 FL    MCH 29.1 26.0 - 34.0 PG    MCHC 31.3 30.0 - 36.5 g/dL    RDW 15.2 (H) 11.5 - 14.5 %    PLATELET 610 976 - 072 K/uL    NEUTROPHILS 77 (H) 32 - 75 %    BAND NEUTROPHILS 3 0 - 6 %    LYMPHOCYTES 10 (L) 12 - 49 %    MONOCYTES 8 5 - 13 %    EOSINOPHILS 2 0 - 7 %    BASOPHILS 0 0 - 1 %    ABS. NEUTROPHILS 21.8 (H) 1.8 - 8.0 K/UL    ABS. LYMPHOCYTES 2.7 0.8 - 3.5 K/UL    ABS. MONOCYTES 2.2 (H) 0.0 - 1.0 K/UL    ABS. EOSINOPHILS 0.5 (H) 0.0 - 0.4 K/UL    ABS.  BASOPHILS 0.0 0.0 - 0.1 K/UL    DF MANUAL      PLATELET COMMENTS LARGE PLATELETS      RBC COMMENTS ANISOCYTOSIS  1+        RBC COMMENTS POLYCHROMASIA  1+        RBC COMMENTS OVALOCYTES  PRESENT        RBC COMMENTS TEARDROP CELLS  PRESENT       METABOLIC PANEL, BASIC    Collection Time: 06/30/17  4:33 AM   Result Value Ref Range    Sodium 144 136 - 145 mmol/L    Potassium 3.0 (L) 3.5 - 5.1 mmol/L    Chloride 110 (H) 97 - 108 mmol/L    CO2 26 21 - 32 mmol/L    Anion gap 8 5 - 15 mmol/L    Glucose 139 (H) 65 - 100 mg/dL    BUN 10 6 - 20 MG/DL    Creatinine 1.77 (H) 0.55 - 1.02 MG/DL    BUN/Creatinine ratio 6 (L) 12 - 20      GFR est AA 39 (L) >60 ml/min/1.73m2    GFR est non-AA 32 (L) >60 ml/min/1.73m2    Calcium 7.5 (L) 8.5 - 10.1 MG/DL   LACTIC ACID, PLASMA    Collection Time: 06/30/17  4:33 AM   Result Value Ref Range    Lactic acid 1.5 0.4 - 2.0 MMOL/L         Assessment     Patricia You is a 46 y. o.yr old female s/p exploratory laparotomy lysis of adhesion and SMA stenting    Plan     OK for extubation today, doing better from a pulm point of view  CHRIS seems to show signs of a leak, which was somewhat expected with the enterotomy and was closed but was not sure how well the ischemic bowel would heal, CHRIS is draining the area well, no signs of wound infection currently  TPN renewed and increased  Keeping NGT in to suction, NPO  Started sliding scale  KCl replaced  [place] is unfortunately grim with dead bowel currently, but will continue full care and she is doing a little better than expected at this point and will give her every chance to improve      Grace Marie MD

## 2017-07-01 ENCOUNTER — APPOINTMENT (OUTPATIENT)
Dept: GENERAL RADIOLOGY | Age: 42
DRG: 335 | End: 2017-07-01
Attending: INTERNAL MEDICINE
Payer: MEDICARE

## 2017-07-01 LAB
ALBUMIN SERPL BCP-MCNC: 1.6 G/DL (ref 3.5–5)
ALBUMIN/GLOB SERPL: 0.3 {RATIO} (ref 1.1–2.2)
ALP SERPL-CCNC: 88 U/L (ref 45–117)
ALT SERPL-CCNC: 14 U/L (ref 12–78)
ANION GAP BLD CALC-SCNC: 5 MMOL/L (ref 5–15)
AST SERPL W P-5'-P-CCNC: 16 U/L (ref 15–37)
BASOPHILS # BLD AUTO: 0.3 K/UL (ref 0–0.1)
BASOPHILS # BLD: 1 % (ref 0–1)
BILIRUB SERPL-MCNC: 0.4 MG/DL (ref 0.2–1)
BUN SERPL-MCNC: 12 MG/DL (ref 6–20)
BUN/CREAT SERPL: 8 (ref 12–20)
CALCIUM SERPL-MCNC: 8.4 MG/DL (ref 8.5–10.1)
CHLORIDE SERPL-SCNC: 117 MMOL/L (ref 97–108)
CO2 SERPL-SCNC: 28 MMOL/L (ref 21–32)
CREAT SERPL-MCNC: 1.51 MG/DL (ref 0.55–1.02)
DIFFERENTIAL METHOD BLD: ABNORMAL
EOSINOPHIL # BLD: 0.3 K/UL (ref 0–0.4)
EOSINOPHIL NFR BLD: 1 % (ref 0–7)
ERYTHROCYTE [DISTWIDTH] IN BLOOD BY AUTOMATED COUNT: 15 % (ref 11.5–14.5)
GLOBULIN SER CALC-MCNC: 4.6 G/DL (ref 2–4)
GLUCOSE BLD STRIP.AUTO-MCNC: 132 MG/DL (ref 65–100)
GLUCOSE BLD STRIP.AUTO-MCNC: 157 MG/DL (ref 65–100)
GLUCOSE BLD STRIP.AUTO-MCNC: 159 MG/DL (ref 65–100)
GLUCOSE SERPL-MCNC: 128 MG/DL (ref 65–100)
HCT VFR BLD AUTO: 26.7 % (ref 35–47)
HGB BLD-MCNC: 8.4 G/DL (ref 11.5–16)
LACTATE SERPL-SCNC: 1.2 MMOL/L (ref 0.4–2)
LYMPHOCYTES # BLD AUTO: 20 % (ref 12–49)
LYMPHOCYTES # BLD: 5.2 K/UL (ref 0.8–3.5)
MAGNESIUM SERPL-MCNC: 1.7 MG/DL (ref 1.6–2.4)
MCH RBC QN AUTO: 28.9 PG (ref 26–34)
MCHC RBC AUTO-ENTMCNC: 31.5 G/DL (ref 30–36.5)
MCV RBC AUTO: 91.8 FL (ref 80–99)
MONOCYTES # BLD: 2.1 K/UL (ref 0–1)
MONOCYTES NFR BLD AUTO: 8 % (ref 5–13)
MYELOCYTES NFR BLD MANUAL: 2 %
NEUTS BAND NFR BLD MANUAL: 3 % (ref 0–6)
NEUTS SEG # BLD: 17.7 K/UL (ref 1.8–8)
NEUTS SEG NFR BLD AUTO: 65 % (ref 32–75)
PHOSPHATE SERPL-MCNC: 2.3 MG/DL (ref 2.6–4.7)
PLATELET # BLD AUTO: 283 K/UL (ref 150–400)
POTASSIUM SERPL-SCNC: 3.1 MMOL/L (ref 3.5–5.1)
PROT SERPL-MCNC: 6.2 G/DL (ref 6.4–8.2)
RBC # BLD AUTO: 2.91 M/UL (ref 3.8–5.2)
RBC MORPH BLD: ABNORMAL
SERVICE CMNT-IMP: ABNORMAL
SODIUM SERPL-SCNC: 150 MMOL/L (ref 136–145)
WBC # BLD AUTO: 26 K/UL (ref 3.6–11)

## 2017-07-01 PROCEDURE — 80053 COMPREHEN METABOLIC PANEL: CPT | Performed by: INTERNAL MEDICINE

## 2017-07-01 PROCEDURE — 3331090002 HH PPS REVENUE DEBIT

## 2017-07-01 PROCEDURE — 74011000250 HC RX REV CODE- 250: Performed by: SURGERY

## 2017-07-01 PROCEDURE — 77030027138 HC INCENT SPIROMETER -A

## 2017-07-01 PROCEDURE — 74011250636 HC RX REV CODE- 250/636: Performed by: SURGERY

## 2017-07-01 PROCEDURE — 74011636637 HC RX REV CODE- 636/637: Performed by: SURGERY

## 2017-07-01 PROCEDURE — 74011000258 HC RX REV CODE- 258: Performed by: SURGERY

## 2017-07-01 PROCEDURE — 36415 COLL VENOUS BLD VENIPUNCTURE: CPT | Performed by: INTERNAL MEDICINE

## 2017-07-01 PROCEDURE — 74011250636 HC RX REV CODE- 250/636: Performed by: INTERNAL MEDICINE

## 2017-07-01 PROCEDURE — 74011250636 HC RX REV CODE- 250/636: Performed by: NURSE PRACTITIONER

## 2017-07-01 PROCEDURE — 74011000258 HC RX REV CODE- 258: Performed by: INTERNAL MEDICINE

## 2017-07-01 PROCEDURE — 82962 GLUCOSE BLOOD TEST: CPT

## 2017-07-01 PROCEDURE — 3331090001 HH PPS REVENUE CREDIT

## 2017-07-01 PROCEDURE — 85025 COMPLETE CBC W/AUTO DIFF WBC: CPT | Performed by: INTERNAL MEDICINE

## 2017-07-01 PROCEDURE — 83735 ASSAY OF MAGNESIUM: CPT | Performed by: INTERNAL MEDICINE

## 2017-07-01 PROCEDURE — 84100 ASSAY OF PHOSPHORUS: CPT | Performed by: INTERNAL MEDICINE

## 2017-07-01 PROCEDURE — 65610000006 HC RM INTENSIVE CARE

## 2017-07-01 PROCEDURE — 83605 ASSAY OF LACTIC ACID: CPT | Performed by: SURGERY

## 2017-07-01 PROCEDURE — C9113 INJ PANTOPRAZOLE SODIUM, VIA: HCPCS | Performed by: SURGERY

## 2017-07-01 PROCEDURE — 71010 XR CHEST PORT: CPT

## 2017-07-01 PROCEDURE — 74011250637 HC RX REV CODE- 250/637: Performed by: SURGERY

## 2017-07-01 RX ORDER — MORPHINE SULFATE 5 MG/ML
INJECTION, SOLUTION INTRAVENOUS CONTINUOUS
Status: DISCONTINUED | OUTPATIENT
Start: 2017-07-01 | End: 2017-08-24 | Stop reason: HOSPADM

## 2017-07-01 RX ORDER — POTASSIUM CHLORIDE 7.45 MG/ML
10 INJECTION INTRAVENOUS
Status: COMPLETED | OUTPATIENT
Start: 2017-07-01 | End: 2017-07-01

## 2017-07-01 RX ADMIN — HYDROMORPHONE HYDROCHLORIDE 2 MG: 2 INJECTION, SOLUTION INTRAMUSCULAR; INTRAVENOUS; SUBCUTANEOUS at 02:24

## 2017-07-01 RX ADMIN — CALCIUM GLUCONATE: 94 INJECTION, SOLUTION INTRAVENOUS at 18:44

## 2017-07-01 RX ADMIN — POTASSIUM CHLORIDE 10 MEQ: 10 INJECTION, SOLUTION INTRAVENOUS at 10:21

## 2017-07-01 RX ADMIN — LORAZEPAM 1 MG: 2 INJECTION INTRAMUSCULAR; INTRAVENOUS at 13:32

## 2017-07-01 RX ADMIN — Medication 10 ML: at 22:17

## 2017-07-01 RX ADMIN — HYDROMORPHONE HYDROCHLORIDE 2 MG: 2 INJECTION, SOLUTION INTRAMUSCULAR; INTRAVENOUS; SUBCUTANEOUS at 10:24

## 2017-07-01 RX ADMIN — CLOPIDOGREL BISULFATE 75 MG: 75 TABLET ORAL at 08:17

## 2017-07-01 RX ADMIN — HYDROMORPHONE HYDROCHLORIDE 2 MG: 2 INJECTION, SOLUTION INTRAMUSCULAR; INTRAVENOUS; SUBCUTANEOUS at 06:28

## 2017-07-01 RX ADMIN — Medication 10 ML: at 08:18

## 2017-07-01 RX ADMIN — SODIUM CHLORIDE 100 MG: 900 INJECTION, SOLUTION INTRAVENOUS at 17:44

## 2017-07-01 RX ADMIN — DIPHENHYDRAMINE HYDROCHLORIDE 12.5 MG: 50 INJECTION, SOLUTION INTRAMUSCULAR; INTRAVENOUS at 08:12

## 2017-07-01 RX ADMIN — POTASSIUM CHLORIDE 10 MEQ: 10 INJECTION, SOLUTION INTRAVENOUS at 11:26

## 2017-07-01 RX ADMIN — PIPERACILLIN SODIUM,TAZOBACTAM SODIUM 3.38 G: 3; .375 INJECTION, POWDER, FOR SOLUTION INTRAVENOUS at 22:15

## 2017-07-01 RX ADMIN — HUMAN INSULIN 2 UNITS: 100 INJECTION, SOLUTION SUBCUTANEOUS at 14:06

## 2017-07-01 RX ADMIN — Medication: at 11:28

## 2017-07-01 RX ADMIN — Medication 10 ML: at 14:08

## 2017-07-01 RX ADMIN — HYDROMORPHONE HYDROCHLORIDE 2 MG: 2 INJECTION, SOLUTION INTRAMUSCULAR; INTRAVENOUS; SUBCUTANEOUS at 08:12

## 2017-07-01 RX ADMIN — Medication 200 MCG/HR: at 04:10

## 2017-07-01 RX ADMIN — ENOXAPARIN SODIUM 40 MG: 40 INJECTION SUBCUTANEOUS at 22:16

## 2017-07-01 RX ADMIN — LORAZEPAM 1 MG: 2 INJECTION INTRAMUSCULAR; INTRAVENOUS at 00:18

## 2017-07-01 RX ADMIN — PIPERACILLIN SODIUM,TAZOBACTAM SODIUM 3.38 G: 3; .375 INJECTION, POWDER, FOR SOLUTION INTRAVENOUS at 08:12

## 2017-07-01 RX ADMIN — HYDROMORPHONE HYDROCHLORIDE 2 MG: 2 INJECTION, SOLUTION INTRAMUSCULAR; INTRAVENOUS; SUBCUTANEOUS at 04:13

## 2017-07-01 RX ADMIN — HUMAN INSULIN 2 UNITS: 100 INJECTION, SOLUTION SUBCUTANEOUS at 09:28

## 2017-07-01 RX ADMIN — Medication: at 17:41

## 2017-07-01 RX ADMIN — POTASSIUM CHLORIDE 10 MEQ: 10 INJECTION, SOLUTION INTRAVENOUS at 13:32

## 2017-07-01 RX ADMIN — SODIUM CHLORIDE 40 MG: 9 INJECTION INTRAMUSCULAR; INTRAVENOUS; SUBCUTANEOUS at 08:12

## 2017-07-01 RX ADMIN — PIPERACILLIN SODIUM,TAZOBACTAM SODIUM 3.38 G: 3; .375 INJECTION, POWDER, FOR SOLUTION INTRAVENOUS at 14:07

## 2017-07-01 RX ADMIN — POTASSIUM CHLORIDE 10 MEQ: 10 INJECTION, SOLUTION INTRAVENOUS at 14:07

## 2017-07-01 RX ADMIN — Medication 200 MCG/HR: at 08:51

## 2017-07-01 NOTE — ROUTINE PROCESS
1930 Report received from Evan Aguero RN using SBAR format  [id number] given to Elbert Ugalde RN using [employer]

## 2017-07-01 NOTE — PROGRESS NOTES
Provided follow-up support for this pt in [facility] [place] [facility]. Present on this visit were pt's parents. Pt was sitting up in bed. [address] introduced self and greeted those present. Pt indicated no needs at this time and shared that her  had just left from visiting. [address] affirmed ongoing availability of support.     Teena Buitrago, Staff   Please call [phone] to page  if needed

## 2017-07-01 NOTE — PROGRESS NOTES
6903 shift summary--Pt on continuous Fentanyl 200 mcg/ hr and prn Dilaudid 2 mg IV Q 2 hrs-- receiving only 30 min - 1 hr relief from meds-- ? Needs PCA-- Large green- brown output via CHRIS--surgeon aware and apparently expects per my report from previous RN--Good output per janice--VSS--Transfer to stepdown? ?

## 2017-07-01 NOTE — PROGRESS NOTES
PULMONARY/CRITICAL CARE/SLEEP MEDICINE    Initial Physician Consultation Note    Name: Era Zavala   : 1975   MRN: [id number]   Date: 2017      Subjective:         Awake, glad to be allowed ice chips. Lactate still normal      Seen and examined. No pressors. Lactate 1.5. CXR clear. Creat 1.77, non-oliguric    Consult Note:      This patient has been seen and evaluated as a new patient to the ICU. Medical records and data reviewed. Patient is a 46 y.o. female who has been admitted to the ICU with post op pulmonary insufficiency after laparotomy and attempted resecition of ischemic bowel. Intraop findings showed frozen abd and SMA stent by subclavian approach was placed. It is unclear if endovascular treatment has resulted in reperfusion. She is hemodynamically stable and is chronically on large doses of opiates for pain management followed by Palliative Medicine. Assessment:   Postop pulmonary insufficiency  Ischemic bowel, unresectable, S/P SMA stent  DEAN- non-oliguric  Opiate dependence  Crohn's disease, H/O repeated abd surgeries  Hypokalemia  Other medical problems per chart      Recommendations:     Extubated  On abx  Replace electrolytes  TORIBIO and DVT prophylaxis  Post op care per surgery  Pain control per Palliative Medicine  For TPN  Critically ill, at risk for further decline.    Mother at bedside had no questions of me  Transfer plans per surgery    Active Problem List:     Problem List  Date Reviewed: 2017          Codes Class    Abdominal pain ICD-10-CM: R10.9  ICD-9-CM: 789.00         Perforated bowel ([facility] ICD-10-CM: K63.1  ICD-9-CM: 569.83         Right flank pain ICD-10-CM: R10.9  ICD-9-CM: 789.09         Crohn's disease ([facility] [address] ICD-10-CM: K50.90  ICD-9-CM: 555.9         DVT (deep venous thrombosis) ([facility] [address] ICD-10-CM: I82.409  ICD-9-CM: 453.40     Overview Signed 8/15/2011  2:08 PM by Rangel Slaughter LPN     07             Incarcerated ventral hernia ICD-10-CM: K46.0  ICD-9-CM: 552.20     Overview Signed 8/15/2011  2:30 PM by Mervin Hsu LPN     History of                   Past Medical History:      has a past medical history of Crohn's disease ([facility] [address] (8/15/2011); [facility] (8/15/2011); Incarcerated ventral hernia (8/15/2011); Right flank pain (8/22/2011); Seizures ([facility] [address] and Stroke ([facility] Past Surgical History:      has a past surgical history that includes other surgical and lap, incisional hernia repair,incarcerated (9-10-08). Home Medications:     Prior to Admission medications    Medication Sig Start Date End Date Taking? Authorizing Provider   sucralfate (CARAFATE) 100 mg/mL suspension Take 1 g by mouth four (4) times daily. 6/23/17  Yes Parish Ambriz MD   docusate sodium (COLACE) 100 mg capsule Take 100 mg by mouth daily. 6/22/17  Yes Carley Howard MD   lactobacillus comb no.10 (PROBIOTIC) 20 billion cell cap Take 1 Cap by mouth two (2) times a day. 6/22/17  Yes Carley Howard MD   HYDROmorphone (DILAUDID) 2 mg tablet Take 1 Tab by mouth every four (4) hours as needed. Max Daily Amount: 12 mg. 6/21/17  Yes Carley Howard MD   cyanocobalamin, vitamin B-12, 5,000 mcg TbDL Take 5,000 mcg by mouth Every Friday. Yes Historical Provider   promethazine (PHENERGAN) 25 mg tablet Take 25 mg by mouth every six (6) hours as needed for Nausea. Tries to alternate with ondansetron   Yes Historical Provider   albuterol sulfate SR (PROVENTIL SR) 4 mg ER tablet Take 4 mg by mouth daily as needed for Other (Wheezing). Tries to alternate with albuterol MDI when she doesn't need rapid resolution of symptoms   Yes Historical Provider   fluticasone-vilanterol (BREO ELLIPTA) 200-25 mcg/dose inhaler Take 1 Puff by inhalation daily. Yes Historical Provider   norgestrel-ethinyl estradiol (CRYSELLE, Earl,) 0.3-30 mg-mcg tab Take 1 Tab by mouth daily. Yes Historical Provider   esomeprazole (NEXIUM) 20 mg capsule Take 20 mg by mouth daily.    Yes Historical Provider   ondansetron (ZOFRAN ODT) 8 mg disintegrating tablet Take 8 mg by mouth every eight (8) hours as needed for Nausea. Tries to alternate with promethazine   Yes Historical Provider   methylnaltrexone (RELISTOR) 150 mg tab tablet Take 150 mg by mouth Daily (before breakfast). Yes Historical Provider   predniSONE (DELTASONE) 20 mg tablet Take 30 mg by mouth daily. Takes 1.5 of the 20 mg tab   Yes Historical Provider   morphine CR (MS CONTIN) 60 mg CR tablet Take 60 mg by mouth three (3) times daily. Yes Historical Provider   morphine IR (MS IR) 30 mg tablet Take 30 mg by mouth every four (4) hours as needed for Pain. Yes Historical Provider   albuterol (PROVENTIL HFA, VENTOLIN HFA, PROAIR HFA) 90 mcg/actuation inhaler Take 2 Puffs by inhalation every four (4) hours as needed for Wheezing. Yes Historical Provider   ALPRAZOLAM (XANAX PO) Take 1 mg by mouth three (3) times daily as needed. Yes Historical Provider   INFLIXIMAB (REMICADE IV) by IntraVENous route every six (6) weeks. Yes Historical Provider       Allergies/Social/Family History: Allergies   Allergen Reactions    Demerol [Meperidine] Unknown (comments)    Soma [Carisoprodol] Rash and Nausea Only    Sulfa (Sulfonamide Antibiotics) Unknown (comments)    Toradol [Ketorolac Tromethamine] Unknown (comments)      Social History   Substance Use Topics    Smoking status: Former Smoker    Smokeless tobacco: Not on file    Alcohol use No      Family History   Problem Relation Age of Onset    Hypertension Father     Stroke Father     Other Father      arthritis        Review of Systems:     Review of systems not obtained due to patient factors.     Objective:   Vital Signs:  Visit Vitals    /87 (BP 1 Location: Right arm, BP Patient Position: At rest)    Pulse 95    Temp 99.2 °F (37.3 °C)    Resp 16    Ht 5' 4\" (1.626 m)    Wt 154 kg (339 lb 8.1 oz)    LMP 05/21/2017 (Approximate)    SpO2 96%    Breastfeeding No    BMI 58.28 kg/m2    O2 Flow Rate (L/min): 2 l/min O2 Device: Room air Temp (24hrs), Av °F (37.2 °C), Min:98.2 °F (36.8 °C), Max:99.3 °F (37.4 °C)           Intake/Output:     Intake/Output Summary (Last 24 hours) at 17 1651  Last data filed at 17 1200   Gross per 24 hour   Intake          3038.34 ml   Output             2405 ml   Net           633.34 ml       Physical Exam:   General:  Awake, appears stated age. Head:  Normocephalic, without obvious abnormality, atraumatic. Eyes:  Conjunctivae/corneas clear. PERRL, EOMs intact. Neck: Supple, symmetrical, trachea midline, no adenopathy, thyroid: no enlargment/tenderness/nodules, no carotid bruit and no JVD. Lungs:   Clear to auscultation bilaterally. Chest wall:  No tenderness or deformity. Heart:  Regular rate and rhythm, S1, S2 normal, no murmur, click, rub or gallop. Abdomen:   Post op dressings   Extremities: Extremities normal, atraumatic, no cyanosis or edema. Pulses: 2+ and symmetric all extremities. Skin: Skin color, texture, turgor normal. No rashes or lesions   Neurologic: Awake         LABS AND  DATA: Personally reviewed  Recent Labs      17   0433   WBC  26.0*  27.2*   HGB  8.4*  8.4*   HCT  26.7*  26.8*   PLT  283  227     Recent Labs      17   0433   NA  150*  144   K  3.1*  3.0*   CL  117*  110*   CO2  28  26   BUN  12  10   CREA  1.51*  1.77*   GLU  128*  139*   CA  8.4*  7.5*   MG  1.7  1.7   PHOS  2.3*  2.6     Recent Labs      17   SGOT  16   AP  88   TP  6.2*   ALB  1.6*   GLOB  4.6*     No results for input(s): INR, PTP, APTT in the last 72 hours. No lab exists for component: INREXT, INREXT   Recent Labs      17   0138   PHI  7.380   PCO2I  43.7   PO2I  100   FIO2I  0.40     No results for input(s): CPK, CKMB, TROIQ, BNPP in the last 72 hours.     MEDS: Reviewed    Chest X-Ray: personally reviewed and report checked    EKG/ Tele      Thank you for allowing me to participate in this patient's care.     MD Jun Sesay MD, [facility]  [facility]

## 2017-07-01 NOTE — PROGRESS NOTES
Progress Note    Patient: Mar Bumpers MRN: [id number]  SSN: [id number]    YOB: 1975  Age: 46 y.o. Sex: female      Admit Date: 2017    3 Days Post-Op    Procedure:  Procedure(s):  LAPAROTOMY EXPLORATORY BOWEL RESECTION, SMA STENT LEFT    Subjective:     No acute surgical issues. Pt is awake and alert. WBC trending down a little bit but is still elevated. CHRIS drain with semibilious output.   Ostomy is minimally productive    Objective:     Visit Vitals    /73    Pulse 92    Temp 98.2 °F (36.8 °C)    Resp 16    Ht 5' 4\" (1.626 m)    Wt 339 lb 8.1 oz (154 kg)    SpO2 100%    Breastfeeding No    BMI 58.28 kg/m2       Temp (24hrs), Av.7 °F (37.1 °C), Min:97.9 °F (36.6 °C), Max:99.3 °F (37.4 °C)        Physical Exam:    Gen:  NAD  Pulm:  Unlabored  Abd:  S/ND/appropriate TTP  Wound:  Clear with minimal erythema and minimal bilious drainage from midline  CHRIS drain with bilious drainage    Recent Results (from the past 24 hour(s))   GLUCOSE, POC    Collection Time: 17 12:57 PM   Result Value Ref Range    Glucose (POC) 128 (H) 65 - 100 mg/dL    Performed by Kyp , POC    Collection Time: 17  6:51 PM   Result Value Ref Range    Glucose (POC) 144 (H) 65 - 100 mg/dL    Performed by Kyp , POC    Collection Time: 17 10:16 PM   Result Value Ref Range    Glucose (POC) 150 (H) 65 - 100 mg/dL    Performed by [facility]    CBC WITH AUTOMATED DIFF    Collection Time: 17  4:23 AM   Result Value Ref Range    WBC 26.0 (H) 3.6 - 11.0 K/uL    RBC 2.91 (L) 3.80 - 5.20 M/uL    HGB 8.4 (L) 11.5 - 16.0 g/dL    HCT 26.7 (L) 35.0 - 47.0 %    MCV 91.8 80.0 - 99.0 FL    MCH 28.9 26.0 - 34.0 PG    MCHC 31.5 30.0 - 36.5 g/dL    RDW 15.0 (H) 11.5 - 14.5 %    PLATELET 425 116 - 636 K/uL    NEUTROPHILS 65 32 - 75 %    BAND NEUTROPHILS 3 0 - 6 %    LYMPHOCYTES 20 12 - 49 %    MONOCYTES 8 5 - 13 %    EOSINOPHILS 1 0 - 7 %    BASOPHILS 1 0 - 1 %    MYELOCYTES 2 (H) 0 %    ABS. NEUTROPHILS 17.7 (H) 1.8 - 8.0 K/UL    ABS. LYMPHOCYTES 5.2 (H) 0.8 - 3.5 K/UL    ABS. MONOCYTES 2.1 (H) 0.0 - 1.0 K/UL    ABS. EOSINOPHILS 0.3 0.0 - 0.4 K/UL    ABS. BASOPHILS 0.3 (H) 0.0 - 0.1 K/UL    DF MANUAL      RBC COMMENTS ANISOCYTOSIS  1+       METABOLIC PANEL, COMPREHENSIVE    Collection Time: 07/01/17  4:23 AM   Result Value Ref Range    Sodium 150 (H) 136 - 145 mmol/L    Potassium 3.1 (L) 3.5 - 5.1 mmol/L    Chloride 117 (H) 97 - 108 mmol/L    CO2 28 21 - 32 mmol/L    Anion gap 5 5 - 15 mmol/L    Glucose 128 (H) 65 - 100 mg/dL    BUN 12 6 - 20 MG/DL    Creatinine 1.51 (H) 0.55 - 1.02 MG/DL    BUN/Creatinine ratio 8 (L) 12 - 20      GFR est AA 46 (L) >60 ml/min/1.73m2    GFR est non-AA 38 (L) >60 ml/min/1.73m2    Calcium 8.4 (L) 8.5 - 10.1 MG/DL    Bilirubin, total 0.4 0.2 - 1.0 MG/DL    ALT (SGPT) 14 12 - 78 U/L    AST (SGOT) 16 15 - 37 U/L    Alk.  phosphatase 88 45 - 117 U/L    Protein, total 6.2 (L) 6.4 - 8.2 g/dL    Albumin 1.6 (L) 3.5 - 5.0 g/dL    Globulin 4.6 (H) 2.0 - 4.0 g/dL    A-G Ratio 0.3 (L) 1.1 - 2.2     MAGNESIUM    Collection Time: 07/01/17  4:23 AM   Result Value Ref Range    Magnesium 1.7 1.6 - 2.4 mg/dL   PHOSPHORUS    Collection Time: 07/01/17  4:23 AM   Result Value Ref Range    Phosphorus 2.3 (L) 2.6 - 4.7 MG/DL   LACTIC ACID    Collection Time: 07/01/17  4:23 AM   Result Value Ref Range    Lactic acid 1.2 0.4 - 2.0 MMOL/L   GLUCOSE, POC    Collection Time: 07/01/17  8:11 AM   Result Value Ref Range    Glucose (POC) 157 (H) 65 - 100 mg/dL    Performed by Sumanth العراقي          Assessment:     Hospital Problems  Date Reviewed: 6/28/2017          Codes Class Noted POA    Abdominal pain ICD-10-CM: R10.9  ICD-9-CM: 789.00  6/25/2017 Unknown              Plan/Recommendations/Medical Decision Making:     - NPO with ice chips  - Continue NG tube decompression  - Monitor CHRIS output  - Renew TPN  - Labs in am  - Hypokalemia:  Replete potassium  - Pain control    Signed By: Alex Perez MD     July 1, 2017

## 2017-07-02 LAB
ALBUMIN SERPL BCP-MCNC: 1.7 G/DL (ref 3.5–5)
ALBUMIN/GLOB SERPL: 0.3 {RATIO} (ref 1.1–2.2)
ALP SERPL-CCNC: 88 U/L (ref 45–117)
ALT SERPL-CCNC: 15 U/L (ref 12–78)
ANION GAP BLD CALC-SCNC: 8 MMOL/L (ref 5–15)
AST SERPL W P-5'-P-CCNC: 23 U/L (ref 15–37)
BACTERIA SPEC CULT: ABNORMAL
BACTERIA SPEC CULT: ABNORMAL
BACTERIA SPEC CULT: NORMAL
BASOPHILS # BLD AUTO: 0 K/UL (ref 0–0.1)
BASOPHILS # BLD: 0 % (ref 0–1)
BILIRUB SERPL-MCNC: 0.3 MG/DL (ref 0.2–1)
BUN SERPL-MCNC: 16 MG/DL (ref 6–20)
BUN/CREAT SERPL: 12 (ref 12–20)
CALCIUM SERPL-MCNC: 8.4 MG/DL (ref 8.5–10.1)
CHLORIDE SERPL-SCNC: 115 MMOL/L (ref 97–108)
CO2 SERPL-SCNC: 26 MMOL/L (ref 21–32)
CREAT SERPL-MCNC: 1.34 MG/DL (ref 0.55–1.02)
DATE LAST DOSE: NORMAL
DIFFERENTIAL METHOD BLD: ABNORMAL
EOSINOPHIL # BLD: 0 K/UL (ref 0–0.4)
EOSINOPHIL NFR BLD: 0 % (ref 0–7)
ERYTHROCYTE [DISTWIDTH] IN BLOOD BY AUTOMATED COUNT: 15.1 % (ref 11.5–14.5)
GLOBULIN SER CALC-MCNC: 5.2 G/DL (ref 2–4)
GLUCOSE BLD STRIP.AUTO-MCNC: 132 MG/DL (ref 65–100)
GLUCOSE BLD STRIP.AUTO-MCNC: 143 MG/DL (ref 65–100)
GLUCOSE BLD STRIP.AUTO-MCNC: 164 MG/DL (ref 65–100)
GLUCOSE BLD STRIP.AUTO-MCNC: 168 MG/DL (ref 65–100)
GLUCOSE SERPL-MCNC: 130 MG/DL (ref 65–100)
GRAM STN SPEC: ABNORMAL
GRAM STN SPEC: ABNORMAL
HCT VFR BLD AUTO: 28.1 % (ref 35–47)
HGB BLD-MCNC: 8.7 G/DL (ref 11.5–16)
LYMPHOCYTES # BLD AUTO: 27 % (ref 12–49)
LYMPHOCYTES # BLD: 5.8 K/UL (ref 0.8–3.5)
MAGNESIUM SERPL-MCNC: 1.8 MG/DL (ref 1.6–2.4)
MCH RBC QN AUTO: 28.2 PG (ref 26–34)
MCHC RBC AUTO-ENTMCNC: 31 G/DL (ref 30–36.5)
MCV RBC AUTO: 91.2 FL (ref 80–99)
METAMYELOCYTES NFR BLD MANUAL: 1 %
MONOCYTES # BLD: 2.2 K/UL (ref 0–1)
MONOCYTES NFR BLD AUTO: 10 % (ref 5–13)
MYELOCYTES NFR BLD MANUAL: 3 %
NEUTS SEG # BLD: 12.7 K/UL (ref 1.8–8)
NEUTS SEG NFR BLD AUTO: 59 % (ref 32–75)
NRBC # BLD: 0.03 K/UL (ref 0–0.01)
NRBC BLD-RTO: 0.1 PER 100 WBC
PHOSPHATE SERPL-MCNC: 3.6 MG/DL (ref 2.6–4.7)
PLATELET # BLD AUTO: 321 K/UL (ref 150–400)
POTASSIUM SERPL-SCNC: 3.2 MMOL/L (ref 3.5–5.1)
PROT SERPL-MCNC: 6.9 G/DL (ref 6.4–8.2)
RBC # BLD AUTO: 3.08 M/UL (ref 3.8–5.2)
RBC MORPH BLD: ABNORMAL
RBC MORPH BLD: ABNORMAL
REPORTED DOSE,DOSE: NORMAL UNITS
REPORTED DOSE/TIME,TMG: NORMAL
SERVICE CMNT-IMP: ABNORMAL
SERVICE CMNT-IMP: NORMAL
SODIUM SERPL-SCNC: 149 MMOL/L (ref 136–145)
VANCOMYCIN TROUGH SERPL-MCNC: 7.8 UG/ML (ref 5–10)
WBC # BLD AUTO: 21.6 K/UL (ref 3.6–11)
WBC MORPH BLD: ABNORMAL
WBC NRBC COR # BLD: ABNORMAL 10*3/UL

## 2017-07-02 PROCEDURE — 3331090001 HH PPS REVENUE CREDIT

## 2017-07-02 PROCEDURE — 74011250636 HC RX REV CODE- 250/636: Performed by: SURGERY

## 2017-07-02 PROCEDURE — C9113 INJ PANTOPRAZOLE SODIUM, VIA: HCPCS | Performed by: SURGERY

## 2017-07-02 PROCEDURE — 74011000258 HC RX REV CODE- 258: Performed by: SURGERY

## 2017-07-02 PROCEDURE — 3331090002 HH PPS REVENUE DEBIT

## 2017-07-02 PROCEDURE — 84100 ASSAY OF PHOSPHORUS: CPT | Performed by: INTERNAL MEDICINE

## 2017-07-02 PROCEDURE — 80053 COMPREHEN METABOLIC PANEL: CPT | Performed by: INTERNAL MEDICINE

## 2017-07-02 PROCEDURE — 36415 COLL VENOUS BLD VENIPUNCTURE: CPT | Performed by: SURGERY

## 2017-07-02 PROCEDURE — 65660000000 HC RM CCU STEPDOWN

## 2017-07-02 PROCEDURE — 74011250636 HC RX REV CODE- 250/636: Performed by: INTERNAL MEDICINE

## 2017-07-02 PROCEDURE — 74011000250 HC RX REV CODE- 250: Performed by: SURGERY

## 2017-07-02 PROCEDURE — 74011250637 HC RX REV CODE- 250/637: Performed by: SURGERY

## 2017-07-02 PROCEDURE — 85025 COMPLETE CBC W/AUTO DIFF WBC: CPT | Performed by: INTERNAL MEDICINE

## 2017-07-02 PROCEDURE — 82962 GLUCOSE BLOOD TEST: CPT

## 2017-07-02 PROCEDURE — 83735 ASSAY OF MAGNESIUM: CPT | Performed by: INTERNAL MEDICINE

## 2017-07-02 PROCEDURE — 80202 ASSAY OF VANCOMYCIN: CPT | Performed by: SURGERY

## 2017-07-02 PROCEDURE — 74011000258 HC RX REV CODE- 258: Performed by: INTERNAL MEDICINE

## 2017-07-02 PROCEDURE — 74011636637 HC RX REV CODE- 636/637: Performed by: SURGERY

## 2017-07-02 RX ORDER — VANCOMYCIN 1.75 GRAM/500 ML IN 0.9 % SODIUM CHLORIDE INTRAVENOUS
1750 EVERY 24 HOURS
Status: DISCONTINUED | OUTPATIENT
Start: 2017-07-03 | End: 2017-07-09

## 2017-07-02 RX ORDER — VANCOMYCIN 1.75 GRAM/500 ML IN 0.9 % SODIUM CHLORIDE INTRAVENOUS
1750
Status: DISCONTINUED | OUTPATIENT
Start: 2017-07-03 | End: 2017-07-02

## 2017-07-02 RX ORDER — HYDROMORPHONE HYDROCHLORIDE 1 MG/ML
1 INJECTION, SOLUTION INTRAMUSCULAR; INTRAVENOUS; SUBCUTANEOUS
Status: DISCONTINUED | OUTPATIENT
Start: 2017-07-02 | End: 2017-07-05

## 2017-07-02 RX ORDER — POTASSIUM CHLORIDE 7.45 MG/ML
10 INJECTION INTRAVENOUS
Status: COMPLETED | OUTPATIENT
Start: 2017-07-02 | End: 2017-07-02

## 2017-07-02 RX ORDER — OCTREOTIDE ACETATE 50 UG/ML
50 INJECTION, SOLUTION INTRAVENOUS; SUBCUTANEOUS 3 TIMES DAILY
Status: DISCONTINUED | OUTPATIENT
Start: 2017-07-02 | End: 2017-07-15

## 2017-07-02 RX ADMIN — Medication 10 ML: at 06:18

## 2017-07-02 RX ADMIN — Medication 10 ML: at 21:56

## 2017-07-02 RX ADMIN — HUMAN INSULIN 2 UNITS: 100 INJECTION, SOLUTION SUBCUTANEOUS at 12:35

## 2017-07-02 RX ADMIN — PIPERACILLIN SODIUM,TAZOBACTAM SODIUM 3.38 G: 3; .375 INJECTION, POWDER, FOR SOLUTION INTRAVENOUS at 13:46

## 2017-07-02 RX ADMIN — PIPERACILLIN SODIUM,TAZOBACTAM SODIUM 3.38 G: 3; .375 INJECTION, POWDER, FOR SOLUTION INTRAVENOUS at 06:17

## 2017-07-02 RX ADMIN — Medication 10 ML: at 13:46

## 2017-07-02 RX ADMIN — CLOPIDOGREL BISULFATE 75 MG: 75 TABLET ORAL at 08:11

## 2017-07-02 RX ADMIN — LORAZEPAM 1 MG: 2 INJECTION INTRAMUSCULAR; INTRAVENOUS at 00:56

## 2017-07-02 RX ADMIN — CALCIUM GLUCONATE: 94 INJECTION, SOLUTION INTRAVENOUS at 18:32

## 2017-07-02 RX ADMIN — POTASSIUM CHLORIDE 10 MEQ: 10 INJECTION, SOLUTION INTRAVENOUS at 13:09

## 2017-07-02 RX ADMIN — SODIUM CHLORIDE 100 MG: 900 INJECTION, SOLUTION INTRAVENOUS at 19:38

## 2017-07-02 RX ADMIN — DIPHENHYDRAMINE HYDROCHLORIDE 12.5 MG: 50 INJECTION, SOLUTION INTRAMUSCULAR; INTRAVENOUS at 23:21

## 2017-07-02 RX ADMIN — PIPERACILLIN SODIUM,TAZOBACTAM SODIUM 3.38 G: 3; .375 INJECTION, POWDER, FOR SOLUTION INTRAVENOUS at 21:55

## 2017-07-02 RX ADMIN — POTASSIUM CHLORIDE 10 MEQ: 10 INJECTION, SOLUTION INTRAVENOUS at 12:09

## 2017-07-02 RX ADMIN — Medication: at 10:52

## 2017-07-02 RX ADMIN — Medication: at 03:53

## 2017-07-02 RX ADMIN — POTASSIUM CHLORIDE 10 MEQ: 10 INJECTION, SOLUTION INTRAVENOUS at 10:52

## 2017-07-02 RX ADMIN — OCTREOTIDE ACETATE 50 MCG: 50 INJECTION, SOLUTION INTRAVENOUS; SUBCUTANEOUS at 21:56

## 2017-07-02 RX ADMIN — HUMAN INSULIN 2 UNITS: 100 INJECTION, SOLUTION SUBCUTANEOUS at 18:22

## 2017-07-02 RX ADMIN — HUMAN INSULIN 2 UNITS: 100 INJECTION, SOLUTION SUBCUTANEOUS at 00:56

## 2017-07-02 RX ADMIN — DIPHENHYDRAMINE HYDROCHLORIDE 12.5 MG: 50 INJECTION, SOLUTION INTRAMUSCULAR; INTRAVENOUS at 07:25

## 2017-07-02 RX ADMIN — LORAZEPAM 1 MG: 2 INJECTION INTRAMUSCULAR; INTRAVENOUS at 12:10

## 2017-07-02 RX ADMIN — OCTREOTIDE ACETATE 50 MCG: 50 INJECTION, SOLUTION INTRAVENOUS; SUBCUTANEOUS at 15:18

## 2017-07-02 RX ADMIN — SODIUM CHLORIDE 40 MG: 9 INJECTION INTRAMUSCULAR; INTRAVENOUS; SUBCUTANEOUS at 08:09

## 2017-07-02 RX ADMIN — POTASSIUM CHLORIDE 10 MEQ: 10 INJECTION, SOLUTION INTRAVENOUS at 09:43

## 2017-07-02 RX ADMIN — Medication: at 18:24

## 2017-07-02 RX ADMIN — VANCOMYCIN HYDROCHLORIDE 1750 MG: 10 INJECTION, POWDER, LYOPHILIZED, FOR SOLUTION INTRAVENOUS at 02:25

## 2017-07-02 RX ADMIN — ENOXAPARIN SODIUM 40 MG: 40 INJECTION SUBCUTANEOUS at 23:22

## 2017-07-02 NOTE — PROGRESS NOTES
Palliative Medicine    Called by on call nurse. Pain not controlled with Fentanyl 200mcg/hr and Dilaudid 2mg IV every 2 hours prn. Patient on morphine prior to admit with MS Contin 60mg every 8 hours and MSIR 30mg breakthrough. She may respond better to morphine for pain control. Fentanyl 200mcg/hr = Morphine 20mg/hr (10:1)  Dilaudid 2mg IV = Morphine 10mg IV (1:5)    Very conservative conversion, would start with PCA of Morphine 6mg/hr and 6mg PCA dose every 10 min. Will continue to adjust based on needs. Monitor for sedation    Surgery attending to bowels.

## 2017-07-02 NOTE — PROGRESS NOTES
TRANSFER - OUT REPORT:    Verbal report given on Yessenia Antonio  being transferred to [facility] (unit) for routine progression of care       Report consisted of patients Situation, Background, Assessment and   Recommendations(SBAR). Information from the following report(s) SBAR, Kardex, OR Summary, Intake/Output, MAR and Recent Results was reviewed with the receiving nurse. Lines:   Quad Lumen 06/27/17 Right Internal jugular (Active)   Central Line Being Utilized Yes 7/2/2017 12:00 PM   Criteria for Appropriate Use Total parenteral nutrition 7/2/2017 12:00 PM   Site Assessment Clean, dry, & intact 7/2/2017 12:00 PM   Infiltration Assessment 0 7/2/2017 12:00 PM   Affected Extremity/Extremities Color distal to insertion site pink (or appropriate for race) 7/2/2017 12:00 PM   Date of Last Dressing Change 06/28/17 7/2/2017 12:00 PM   Dressing Status Intact 7/2/2017 12:00 PM   Dressing Type Disk with Chlorhexadine gluconate (CHG); Transparent 7/2/2017 12:00 PM   Action Taken Open ports on tubing capped 7/2/2017 12:00 PM   Proximal Hub Color/Line Status White;Capped;Flushed 7/2/2017 12:00 PM   Positive Blood Return (Medial Site) Yes 7/2/2017  4:00 AM   Medial 1 Hub Color/Line Status Melene Bullion; Infusing;Flushed 7/2/2017 12:00 PM   Positive Blood Return (Lateral Site) Yes 7/2/2017  4:00 AM   Medial 2 Hub Color/Line Status Blue; Infusing;Flushed 7/2/2017 12:00 PM   Positive Blood Return (Site #3) Yes 7/2/2017  4:00 AM   Distal Hub Color/Line Status Brown; Infusing;Flushed 7/2/2017 12:00 PM   Positive Blood Return (Site #4) Yes 7/2/2017  4:00 AM   Alcohol Cap Used Yes 7/2/2017 12:00 PM        Opportunity for questions and clarification was provided.       Patient transported with:   Monitor  Patient-specific medications from Pharmacy  Registered Nurse  Tech

## 2017-07-02 NOTE — PROGRESS NOTES
PULMONARY/CRITICAL CARE/SLEEP MEDICINE    Initial Physician Consultation Note    Name: Nighat Adan   : 1975   MRN: [id number]   Date: 2017      Subjective:       Up in the chair. Pain is an issue      Awake, glad to be allowed ice chips. Lactate still normal      Seen and examined. No pressors. Lactate 1.5. CXR clear. Creat 1.77, non-oliguric    Consult Note:      This patient has been seen and evaluated as a new patient to the ICU. Medical records and data reviewed. Patient is a 46 y.o. female who has been admitted to the ICU with post op pulmonary insufficiency after laparotomy and attempted resecition of ischemic bowel. Intraop findings showed frozen abd and SMA stent by subclavian approach was placed. It is unclear if endovascular treatment has resulted in reperfusion. She is hemodynamically stable and is chronically on large doses of opiates for pain management followed by Palliative Medicine.        Assessment:   Postop pulmonary insufficiency  Ischemic bowel, unresectable, S/P SMA stent  DEAN- non-oliguric  Opiate dependence  Crohn's disease, H/O repeated abd surgeries  Hypokalemia  Other medical problems per chart      Recommendations:     Extubated  On abx  Replace electrolytes  TORIBIO and DVT prophylaxis  Post op care per surgery  Pain control per Palliative Medicine  Surgery managing TPN  Mother at bedside had no questions of me  Transfer plans per surgery  We will be available to help again as needed    Active Problem List:     Problem List  Date Reviewed: 2017          Codes Class    Abdominal pain ICD-10-CM: R10.9  ICD-9-CM: 789.00         Perforated bowel ([facility] ICD-10-CM: K63.1  ICD-9-CM: 569.83         Right flank pain ICD-10-CM: R10.9  ICD-9-CM: 789.09         Crohn's disease ([facility] ICD-10-CM: K50.90  ICD-9-CM: 555.9         DVT (deep venous thrombosis) ([facility] ICD-10-CM: I82.409  ICD-9-CM: 453.40     Overview Signed 8/15/2011  2:08 PM by Isabella Moses LPN 03             Incarcerated ventral hernia ICD-10-CM: K46.0  ICD-9-CM: 552.20     Overview Signed 8/15/2011  2:30 PM by Scarlett Gutierrez LPN     History of                   Past Medical History:      has a past medical history of Crohn's disease ([facility] (8/15/2011); DVT (deep venous thrombosis) ([facility] [address] (8/15/2011); Incarcerated ventral hernia (8/15/2011); Right flank pain (8/22/2011); Seizures ([facility] and [facility] Past Surgical History:      has a past surgical history that includes other surgical and lap, incisional hernia repair,incarcerated (9-10-08). Home Medications:     Prior to Admission medications    Medication Sig Start Date End Date Taking? Authorizing Provider   sucralfate (CARAFATE) 100 mg/mL suspension Take 1 g by mouth four (4) times daily. 6/23/17  Yes Jim Grey MD   docusate sodium (COLACE) 100 mg capsule Take 100 mg by mouth daily. 6/22/17  Yes Henna Santamaria MD   lactobacillus comb no.10 (PROBIOTIC) 20 billion cell cap Take 1 Cap by mouth two (2) times a day. 6/22/17  Yes Henna Santamaria MD   HYDROmorphone (DILAUDID) 2 mg tablet Take 1 Tab by mouth every four (4) hours as needed. Max Daily Amount: 12 mg. 6/21/17  Yes Henna Santamaria MD   cyanocobalamin, vitamin B-12, 5,000 mcg TbDL Take 5,000 mcg by mouth Every Friday. Yes Historical Provider   promethazine (PHENERGAN) 25 mg tablet Take 25 mg by mouth every six (6) hours as needed for Nausea. Tries to alternate with ondansetron   Yes Historical Provider   albuterol sulfate SR (PROVENTIL SR) 4 mg ER tablet Take 4 mg by mouth daily as needed for Other (Wheezing). Tries to alternate with albuterol MDI when she doesn't need rapid resolution of symptoms   Yes Historical Provider   fluticasone-vilanterol (BREO ELLIPTA) 200-25 mcg/dose inhaler Take 1 Puff by inhalation daily. Yes Historical Provider   norgestrel-ethinyl estradiol (CRYSELLE, 28,) 0.3-30 mg-mcg tab Take 1 Tab by mouth daily.    Yes Historical Provider   esomeprazole (NEXIUM) 20 mg capsule Take 20 mg by mouth daily. Yes Historical Provider   ondansetron (ZOFRAN ODT) 8 mg disintegrating tablet Take 8 mg by mouth every eight (8) hours as needed for Nausea. Tries to alternate with promethazine   Yes Historical Provider   methylnaltrexone (RELISTOR) 150 mg tab tablet Take 150 mg by mouth Daily (before breakfast). Yes Historical Provider   predniSONE (DELTASONE) 20 mg tablet Take 30 mg by mouth daily. Takes 1.5 of the 20 mg tab   Yes Historical Provider   morphine CR (MS CONTIN) 60 mg CR tablet Take 60 mg by mouth three (3) times daily. Yes Historical Provider   morphine IR (MS IR) 30 mg tablet Take 30 mg by mouth every four (4) hours as needed for Pain. Yes Historical Provider   albuterol (PROVENTIL HFA, VENTOLIN HFA, PROAIR HFA) 90 mcg/actuation inhaler Take 2 Puffs by inhalation every four (4) hours as needed for Wheezing. Yes Historical Provider   ALPRAZOLAM (XANAX PO) Take 1 mg by mouth three (3) times daily as needed. Yes Historical Provider   INFLIXIMAB (REMICADE IV) by IntraVENous route every six (6) weeks. Yes Historical Provider       Allergies/Social/Family History: Allergies   Allergen Reactions    Demerol [Meperidine] Unknown (comments)    Soma [Carisoprodol] Rash and Nausea Only    Sulfa (Sulfonamide Antibiotics) Unknown (comments)    Toradol [Ketorolac Tromethamine] Unknown (comments)      Social History   Substance Use Topics    Smoking status: Former Smoker    Smokeless tobacco: Not on file    Alcohol use No      Family History   Problem Relation Age of Onset    Hypertension Father     Stroke Father     Other Father      arthritis        Review of Systems:     Review of systems not obtained due to patient factors.     Objective:   Vital Signs:  Visit Vitals    /86 (BP 1 Location: Left arm, BP Patient Position: At rest)    Pulse (!) 116    Temp 98.7 °F (37.1 °C)    Resp 13    Ht 5' 4\" (1.626 m)    Wt 153.1 kg (337 lb 8.4 oz)    LMP 05/21/2017 (Approximate)    SpO2 95%    Breastfeeding No    BMI 57.94 kg/m2    O2 Flow Rate (L/min): 2 l/min O2 Device: Room air Temp (24hrs), Av.7 °F (37.1 °C), Min:98.2 °F (36.8 °C), Max:99.2 °F (37.3 °C)           Intake/Output:     Intake/Output Summary (Last 24 hours) at 17 1404  Last data filed at 17 1227   Gross per 24 hour   Intake          3135.13 ml   Output             4630 ml   Net         -1494.87 ml       Physical Exam:   General:  Awake, appears stated age. Head:  Normocephalic, without obvious abnormality, atraumatic. Eyes:  Conjunctivae/corneas clear. PERRL, EOMs intact. Neck: Supple, symmetrical, trachea midline, no adenopathy, thyroid: no enlargment/tenderness/nodules, no carotid bruit and no JVD. Lungs:   Clear to auscultation bilaterally. Chest wall:  No tenderness or deformity. Heart:  Regular rate and rhythm, S1, S2 normal, no murmur, click, rub or gallop. Abdomen:   Post op dressings   Extremities: Extremities normal, atraumatic, no cyanosis or edema. Pulses: 2+ and symmetric all extremities. Skin: Skin color, texture, turgor normal. No rashes or lesions   Neurologic: Awake         LABS AND  DATA: Personally reviewed  Recent Labs      17   WBC  21.6*  26.0*   HGB  8.7*  8.4*   HCT  28.1*  26.7*   PLT  321  283     Recent Labs      17   NA  149*  150*   K  3.2*  3.1*   CL  115*  117*   CO2  26  28   BUN  16  12   CREA  1.34*  1.51*   GLU  130*  128*   CA  8.4*  8.4*   MG  1.8  1.7   PHOS  3.6  2.3*     Recent Labs      17   SGOT  23  16   AP  88  88   TP  6.9  6.2*   ALB  1.7*  1.6*   GLOB  5.2*  4.6*     No results for input(s): INR, PTP, APTT in the last 72 hours. No lab exists for component: INREXT, INREXT   No results for input(s): PHI, PCO2I, PO2I, FIO2I in the last 72 hours. No results for input(s): CPK, CKMB, TROIQ, BNPP in the last 72 hours.     MEDS: Reviewed    Chest X-Ray: personally reviewed and report checked    EKG/ Tele      Thank you for allowing me to participate in this patient's care.     MD Suresh Allan MD, [facility]  [facility]

## 2017-07-02 NOTE — PROGRESS NOTES
GI PROGRESS NOTE    NAME:             Edel Desouza   :              1975   MRN:              [id number]   Admit Date:     2017  Todays Date:  2017      Subjective:          Abdominal pain: meds switched to MS more lethargic  Nausea:no NPO   Vomiting:no   Diarrhea: ostomy drainage    Review of Systems - Respiratory ROS: no cough, shortness of breath, or wheezing  Cardiovascular ROS: no chest pain or dyspnea on exertion  Medications-reviewed     Current Facility-Administered Medications   Medication Dose Route Frequency    TPN ADULT - CENTRAL   IntraVENous CONTINUOUS    potassium chloride 10 mEq in 100 ml IVPB  10 mEq IntraVENous Q1H    [START ON 7/3/2017] vancomycin (VANCOCIN) 1750 mg in  ml infusion  1,750 mg IntraVENous Q24H    TPN ADULT - CENTRAL   IntraVENous CONTINUOUS    insulin regular (NOVOLIN R, HUMULIN R) injection   SubCUTAneous Q6H    morphine (PF)  mg/30 ml   IntraVENous CONTINUOUS    glucose chewable tablet 16 g  4 Tab Oral PRN    glucagon (GLUCAGEN) injection 1 mg  1 mg IntraMUSCular PRN    dextrose 10 % infusion 125-250 mL  125-250 mL IntraVENous PRN    LORazepam (ATIVAN) injection 1 mg  1 mg IntraVENous Q12H    pantoprazole (PROTONIX) 40 mg in sodium chloride 0.9 % 10 mL injection  40 mg IntraVENous DAILY    albuterol-ipratropium (DUO-NEB) 2.5 MG-0.5 MG/3 ML  3 mL Nebulization Q6H PRN    clopidogrel (PLAVIX) tablet 75 mg  75 mg Per NG tube DAILY    prochlorperazine (COMPAZINE) with saline injection 5 mg  5 mg IntraVENous Q6H PRN    anidulafungin (ERAXIS) 100 mg in 0.9% sodium chloride 130 mL IVPB  100 mg IntraVENous Q24H    ondansetron (ZOFRAN) injection 8 mg  8 mg IntraVENous Q6H PRN    sodium chloride (NS) flush 5-10 mL  5-10 mL IntraVENous Q8H    sodium chloride (NS) flush 5-10 mL  5-10 mL IntraVENous PRN    sodium chloride (NS) flush 5-10 mL  5-10 mL IntraVENous Q8H    sodium chloride (NS) flush 5-10 mL  5-10 mL IntraVENous PRN  naloxone (NARCAN) injection 0.4 mg  0.4 mg IntraVENous PRN    diphenhydrAMINE (BENADRYL) injection 12.5 mg  12.5 mg IntraVENous Q4H PRN    enoxaparin (LOVENOX) injection 40 mg  40 mg SubCUTAneous Q24H    piperacillin-tazobactam (ZOSYN) 3.375 g in 0.9% sodium chloride (MBP/ADV) 100 mL  3.375 g IntraVENous Q8H    Vancomycin ---Pharmacy to Dose   Other Rx Dosing/Monitoring        Objective:   Patient Vitals for the past 8 hrs:   BP Temp Pulse Resp SpO2 Weight   07/02/17 0800 (!) 144/96 99.1 °F (37.3 °C) (!) 104 13 97 % 153.1 kg (337 lb 8.4 oz)   07/02/17 0700 147/80 - 100 11 92 % -   07/02/17 0600 (!) 156/98 - (!) 107 11 93 % -   07/02/17 0500 (!) 147/92 - (!) 106 11 94 % -   07/02/17 0406 - - (!) 104 - - -   07/02/17 0400 151/88 98.4 °F (36.9 °C) (!) 118 14 98 % -   07/02/17 0300 142/82 - (!) 103 11 95 % -   07/02/17 0200 (!) 140/96 - (!) 108 12 95 % -     07/02 0701 - 07/02 1900  In: 83 [I.V.:83]  Out: 470 [Urine:250; Drains:70]  06/30 1901 - 07/02 0700  In: 4801.8 [I.V.:4801.8]  Out: 6400 [Urine:3350; Drains:1150]    EXAM:    Visit Vitals    BP (!) 144/96 (BP 1 Location: Right arm, BP Patient Position: At rest)    Pulse (!) 104    Temp 99.1 °F (37.3 °C)    Resp 13    Ht 5' 4\" (1.626 m)    Wt 153.1 kg (337 lb 8.4 oz)    LMP 05/21/2017 (Approximate)    SpO2 97%    Breastfeeding No    BMI 57.94 kg/m2     General:                    Cooperative  Neurologic:                Alert and oriented X 3. HEENT:                     PERRLA, EOMI.    Lungs:                                 Coarse post extubation  Heart:                                  s1 s2  Abdomen:                  Tender, distended, BS hypo  Extremities:               edema  Psych:                       Anxious        Data Review     Recent Labs      07/02/17 0033  07/01/17 0423   WBC  21.6*  26.0*   HGB  8.7*  8.4*   HCT  28.1*  26.7*   PLT  321  283     Recent Labs      07/02/17 0033  07/01/17 0423   NA  149*  150*   K  3.2*  3.1* CL  115*  117*   CO2  26  28   BUN  16  12   CREA  1.34*  1.51*   GLU  130*  128*   PHOS  3.6  2.3*   CA  8.4*  8.4*     Recent Labs      07/02/17   0033  07/01/17   0423   SGOT  23  16   AP  88  88   TP  6.9  6.2*   ALB  1.7*  1.6*   GLOB  5.2*  4.6*                              Assessment:   · Ischemic bowel s/p SMA stenting  · Crohn's Disease s/p ex lap         Active Problems:    Abdominal pain (6/25/2017)        Plan:   · Supportive care  · Following  · Would adding antidepressant help with pain control         Ryley Garcia MD

## 2017-07-02 NOTE — PROGRESS NOTES
Pharmacist Note - Vancomycin Dosing  Therapy day 8  Indication: Post surgical site infection/SSTI   - S/p xlap w/lysis of adhesions. Now presenting with leukocytosis, S/Sx of infection, and mesenteric edema  - Ischemic bowel s/p attempt at resection on 6/28, ended up with SMA stent due to significant adhesions  Current regimen: 1750 mg IV q48h    A Trough Level resulted at 7.8 mcg/mL which was obtained 47 hrs post-dose. The extrapolated \"true\" trough is approximately 7.6 mcg/mL based on the patient's known kinetics. Goal trough: 10 - 15 mcg/mL     Plan: Change to Vancomycin 1750 mg IV q36h . Pharmacy will continue to monitor this patient daily for changes in clinical status and renal function.

## 2017-07-02 NOTE — PROGRESS NOTES
Progress Note    Patient: Linda Otoole MRN: [id number]  SSN: [id number]    YOB: 1975  Age: 46 y.o. Sex: female      Admit Date: 2017    4 Days Post-Op    Procedure:  Procedure(s):  LAPAROTOMY EXPLORATORY BOWEL RESECTION, SMA STENT LEFT    Subjective:     No acute surgical issues. Pt is awake but somnolent. WBC trending down. CHRIS drain with bilious output and midline incision also has bilious output.   Ostomy is minimally productive    Objective:     Visit Vitals    /86 (BP 1 Location: Left arm, BP Patient Position: At rest)    Pulse (!) 116    Temp 98.7 °F (37.1 °C)    Resp 13    Ht 5' 4\" (1.626 m)    Wt 337 lb 8.4 oz (153.1 kg)    SpO2 95%    Breastfeeding No    BMI 57.94 kg/m2       Temp (24hrs), Av.7 °F (37.1 °C), Min:98.2 °F (36.8 °C), Max:99.2 °F (37.3 °C)        Physical Exam:    Gen:  NAD  Pulm:  Unlabored  Abd:  S/ND/appropriate TTP  Wound: Minimal erythema and bilious drainage from midline  CHRIS drain with bilious drainage    Recent Results (from the past 24 hour(s))   GLUCOSE, POC    Collection Time: 17  6:53 PM   Result Value Ref Range    Glucose (POC) 132 (H) 65 - 100 mg/dL    Performed by Rama Doyle, TROUGH    Collection Time: 17 12:33 AM   Result Value Ref Range    Vancomycin,trough 7.8 5.0 - 10.0 ug/mL    Reported dose date: NOT PROVIDED      Reported dose time: NOT PROVIDED      Reported dose: NOT PROVIDED UNITS   CBC WITH AUTOMATED DIFF    Collection Time: 17 12:33 AM   Result Value Ref Range    WBC 21.6 (H) 3.6 - 11.0 K/uL    RBC 3.08 (L) 3.80 - 5.20 M/uL    HGB 8.7 (L) 11.5 - 16.0 g/dL    HCT 28.1 (L) 35.0 - 47.0 %    MCV 91.2 80.0 - 99.0 FL    MCH 28.2 26.0 - 34.0 PG    MCHC 31.0 30.0 - 36.5 g/dL    RDW 15.1 (H) 11.5 - 14.5 %    PLATELET 682 882 - 815 K/uL    NEUTROPHILS 59 32 - 75 %    LYMPHOCYTES 27 12 - 49 %    MONOCYTES 10 5 - 13 %    EOSINOPHILS 0 0 - 7 %    BASOPHILS 0 0 - 1 %    METAMYELOCYTES 1 (H) 0 % MYELOCYTES 3 (H) 0 %    ABS. NEUTROPHILS 12.7 (H) 1.8 - 8.0 K/UL    ABS. LYMPHOCYTES 5.8 (H) 0.8 - 3.5 K/UL    ABS. MONOCYTES 2.2 (H) 0.0 - 1.0 K/UL    ABS. EOSINOPHILS 0.0 0.0 - 0.4 K/UL    ABS. BASOPHILS 0.0 0.0 - 0.1 K/UL    DF MANUAL      RBC COMMENTS ANISOCYTOSIS  1+        RBC COMMENTS POLYCHROMASIA  PRESENT        WBC COMMENTS REACTIVE LYMPHS     METABOLIC PANEL, COMPREHENSIVE    Collection Time: 07/02/17 12:33 AM   Result Value Ref Range    Sodium 149 (H) 136 - 145 mmol/L    Potassium 3.2 (L) 3.5 - 5.1 mmol/L    Chloride 115 (H) 97 - 108 mmol/L    CO2 26 21 - 32 mmol/L    Anion gap 8 5 - 15 mmol/L    Glucose 130 (H) 65 - 100 mg/dL    BUN 16 6 - 20 MG/DL    Creatinine 1.34 (H) 0.55 - 1.02 MG/DL    BUN/Creatinine ratio 12 12 - 20      GFR est AA 53 (L) >60 ml/min/1.73m2    GFR est non-AA 44 (L) >60 ml/min/1.73m2    Calcium 8.4 (L) 8.5 - 10.1 MG/DL    Bilirubin, total 0.3 0.2 - 1.0 MG/DL    ALT (SGPT) 15 12 - 78 U/L    AST (SGOT) 23 15 - 37 U/L    Alk.  phosphatase 88 45 - 117 U/L    Protein, total 6.9 6.4 - 8.2 g/dL    Albumin 1.7 (L) 3.5 - 5.0 g/dL    Globulin 5.2 (H) 2.0 - 4.0 g/dL    A-G Ratio 0.3 (L) 1.1 - 2.2     MAGNESIUM    Collection Time: 07/02/17 12:33 AM   Result Value Ref Range    Magnesium 1.8 1.6 - 2.4 mg/dL   PHOSPHORUS    Collection Time: 07/02/17 12:33 AM   Result Value Ref Range    Phosphorus 3.6 2.6 - 4.7 MG/DL   NUCLEATED RBC    Collection Time: 07/02/17 12:33 AM   Result Value Ref Range    NRBC 0.1 (H) 0  WBC    ABSOLUTE NRBC 0.03 (H) 0.00 - 0.01 K/uL    WBC CORRECTED FOR NR ADJUSTED FOR NUCLEATED RBC'S     GLUCOSE, POC    Collection Time: 07/02/17 12:38 AM   Result Value Ref Range    Glucose (POC) 168 (H) 65 - 100 mg/dL    Performed by Kaskado    GLUCOSE, POC    Collection Time: 07/02/17  6:16 AM   Result Value Ref Range    Glucose (POC) 132 (H) 65 - 100 mg/dL    Performed by Kaskado    GLUCOSE, POC    Collection Time: 07/02/17 12:12 PM   Result Value Ref Range Glucose (POC) 143 (H) 65 - 100 mg/dL    Performed by Mony Camejo          Assessment:     Hospital Problems  Date Reviewed: 6/28/2017          Codes Class Noted POA    Abdominal pain ICD-10-CM: R10.9  ICD-9-CM: 789.00  6/25/2017 Unknown              Plan/Recommendations/Medical Decision Making:     - NPO with ice chips  - Continue NG tube decompression  - Monitor CHRIS output  - Renew TPN  - Labs in am  - Hypokalemia:  Replete potassium  - Pain control  - EC fistula:  Start octreotide  - May transfer to [facility] if bed is needed in ICU but given persistent tachycardia and anxiety, pt may benefit from 1 more day in ICU    Signed By: Joaquim Young MD     July 2, 2017

## 2017-07-02 NOTE — PROGRESS NOTES
1930: Bedside and Verbal shift change report given to Kristina Chaney, RN (oncoming nurse) by Yandy Camejo RN (offgoing nurse). Report included the following information SBAR, Kardex, OR Summary, Intake/Output, MAR, Accordion, Recent Results and Cardiac Rhythm NSR-ST.     1945: Assessment completed. Pt requesting more pain medicine for pain 8/10- states back is very sore from the bed. Encouraged repositioning and turning. Pt declined despite education regarding prevention of skin breakdown. Pt drifting off to sleep while conversing with mother and this RN. Will monitor.

## 2017-07-03 PROBLEM — K55.9 SMALL BOWEL ISCHEMIA (HCC): Status: ACTIVE | Noted: 2017-06-28

## 2017-07-03 PROBLEM — K63.2 ENTEROCUTANEOUS FISTULA: Status: ACTIVE | Noted: 2017-06-30

## 2017-07-03 PROBLEM — K55.069 SUPERIOR MESENTERIC ARTERY THROMBOSIS (HCC): Status: ACTIVE | Noted: 2017-06-28

## 2017-07-03 LAB
ANION GAP BLD CALC-SCNC: 5 MMOL/L (ref 5–15)
BUN SERPL-MCNC: 21 MG/DL (ref 6–20)
BUN/CREAT SERPL: 15 (ref 12–20)
CALCIUM SERPL-MCNC: 8.9 MG/DL (ref 8.5–10.1)
CHLORIDE SERPL-SCNC: 115 MMOL/L (ref 97–108)
CO2 SERPL-SCNC: 27 MMOL/L (ref 21–32)
CREAT SERPL-MCNC: 1.42 MG/DL (ref 0.55–1.02)
ERYTHROCYTE [DISTWIDTH] IN BLOOD BY AUTOMATED COUNT: 15.5 % (ref 11.5–14.5)
GLUCOSE BLD STRIP.AUTO-MCNC: 115 MG/DL (ref 65–100)
GLUCOSE BLD STRIP.AUTO-MCNC: 143 MG/DL (ref 65–100)
GLUCOSE BLD STRIP.AUTO-MCNC: 161 MG/DL (ref 65–100)
GLUCOSE BLD STRIP.AUTO-MCNC: 180 MG/DL (ref 65–100)
GLUCOSE BLD STRIP.AUTO-MCNC: 190 MG/DL (ref 65–100)
GLUCOSE SERPL-MCNC: 133 MG/DL (ref 65–100)
HCT VFR BLD AUTO: 27.1 % (ref 35–47)
HGB BLD-MCNC: 8.4 G/DL (ref 11.5–16)
MAGNESIUM SERPL-MCNC: 1.8 MG/DL (ref 1.6–2.4)
MCH RBC QN AUTO: 28.4 PG (ref 26–34)
MCHC RBC AUTO-ENTMCNC: 31 G/DL (ref 30–36.5)
MCV RBC AUTO: 91.6 FL (ref 80–99)
PHOSPHATE SERPL-MCNC: 4.5 MG/DL (ref 2.6–4.7)
PLATELET # BLD AUTO: 405 K/UL (ref 150–400)
POTASSIUM SERPL-SCNC: 3.6 MMOL/L (ref 3.5–5.1)
RBC # BLD AUTO: 2.96 M/UL (ref 3.8–5.2)
SERVICE CMNT-IMP: ABNORMAL
SODIUM SERPL-SCNC: 147 MMOL/L (ref 136–145)
WBC # BLD AUTO: 17.4 K/UL (ref 3.6–11)

## 2017-07-03 PROCEDURE — 84100 ASSAY OF PHOSPHORUS: CPT | Performed by: SURGERY

## 2017-07-03 PROCEDURE — 36415 COLL VENOUS BLD VENIPUNCTURE: CPT | Performed by: SURGERY

## 2017-07-03 PROCEDURE — C9113 INJ PANTOPRAZOLE SODIUM, VIA: HCPCS | Performed by: SURGERY

## 2017-07-03 PROCEDURE — 74011250636 HC RX REV CODE- 250/636: Performed by: INTERNAL MEDICINE

## 2017-07-03 PROCEDURE — 74011250636 HC RX REV CODE- 250/636: Performed by: PHYSICAL MEDICINE & REHABILITATION

## 2017-07-03 PROCEDURE — 74011636637 HC RX REV CODE- 636/637: Performed by: SURGERY

## 2017-07-03 PROCEDURE — 83735 ASSAY OF MAGNESIUM: CPT | Performed by: SURGERY

## 2017-07-03 PROCEDURE — 74011250636 HC RX REV CODE- 250/636: Performed by: SURGERY

## 2017-07-03 PROCEDURE — 74011000258 HC RX REV CODE- 258: Performed by: SURGERY

## 2017-07-03 PROCEDURE — 74011000258 HC RX REV CODE- 258: Performed by: INTERNAL MEDICINE

## 2017-07-03 PROCEDURE — 74011636637 HC RX REV CODE- 636/637: Performed by: PHYSICIAN ASSISTANT

## 2017-07-03 PROCEDURE — 74011000250 HC RX REV CODE- 250: Performed by: PHYSICIAN ASSISTANT

## 2017-07-03 PROCEDURE — 82962 GLUCOSE BLOOD TEST: CPT

## 2017-07-03 PROCEDURE — 74011000258 HC RX REV CODE- 258: Performed by: PHYSICIAN ASSISTANT

## 2017-07-03 PROCEDURE — 74011250637 HC RX REV CODE- 250/637: Performed by: SURGERY

## 2017-07-03 PROCEDURE — 85027 COMPLETE CBC AUTOMATED: CPT | Performed by: SURGERY

## 2017-07-03 PROCEDURE — 3331090002 HH PPS REVENUE DEBIT

## 2017-07-03 PROCEDURE — 3331090001 HH PPS REVENUE CREDIT

## 2017-07-03 PROCEDURE — 65660000000 HC RM CCU STEPDOWN

## 2017-07-03 PROCEDURE — 74011250636 HC RX REV CODE- 250/636: Performed by: PHYSICIAN ASSISTANT

## 2017-07-03 PROCEDURE — 80048 BASIC METABOLIC PNL TOTAL CA: CPT | Performed by: SURGERY

## 2017-07-03 PROCEDURE — 74011000250 HC RX REV CODE- 250: Performed by: SURGERY

## 2017-07-03 RX ORDER — LORAZEPAM 1 MG/1
2 TABLET ORAL EVERY 8 HOURS
Status: DISCONTINUED | OUTPATIENT
Start: 2017-07-03 | End: 2017-07-05

## 2017-07-03 RX ADMIN — Medication 10 ML: at 06:15

## 2017-07-03 RX ADMIN — HYDROMORPHONE HYDROCHLORIDE 1 MG: 1 INJECTION, SOLUTION INTRAMUSCULAR; INTRAVENOUS; SUBCUTANEOUS at 18:45

## 2017-07-03 RX ADMIN — DIPHENHYDRAMINE HYDROCHLORIDE 12.5 MG: 50 INJECTION, SOLUTION INTRAMUSCULAR; INTRAVENOUS at 11:36

## 2017-07-03 RX ADMIN — Medication 10 ML: at 21:36

## 2017-07-03 RX ADMIN — HYDROMORPHONE HYDROCHLORIDE 1 MG: 1 INJECTION, SOLUTION INTRAMUSCULAR; INTRAVENOUS; SUBCUTANEOUS at 06:15

## 2017-07-03 RX ADMIN — Medication: at 21:17

## 2017-07-03 RX ADMIN — LORAZEPAM 2 MG: 1 TABLET ORAL at 21:18

## 2017-07-03 RX ADMIN — Medication: at 03:43

## 2017-07-03 RX ADMIN — SODIUM CHLORIDE 40 MG: 9 INJECTION INTRAMUSCULAR; INTRAVENOUS; SUBCUTANEOUS at 10:40

## 2017-07-03 RX ADMIN — PIPERACILLIN SODIUM,TAZOBACTAM SODIUM 3.38 G: 3; .375 INJECTION, POWDER, FOR SOLUTION INTRAVENOUS at 14:01

## 2017-07-03 RX ADMIN — CLOPIDOGREL BISULFATE 75 MG: 75 TABLET ORAL at 10:36

## 2017-07-03 RX ADMIN — OCTREOTIDE ACETATE 50 MCG: 50 INJECTION, SOLUTION INTRAVENOUS; SUBCUTANEOUS at 10:37

## 2017-07-03 RX ADMIN — HUMAN INSULIN 2 UNITS: 100 INJECTION, SOLUTION SUBCUTANEOUS at 00:57

## 2017-07-03 RX ADMIN — VANCOMYCIN HYDROCHLORIDE 1750 MG: 10 INJECTION, POWDER, LYOPHILIZED, FOR SOLUTION INTRAVENOUS at 03:30

## 2017-07-03 RX ADMIN — SODIUM CHLORIDE 100 MG: 900 INJECTION, SOLUTION INTRAVENOUS at 18:38

## 2017-07-03 RX ADMIN — PIPERACILLIN SODIUM,TAZOBACTAM SODIUM 3.38 G: 3; .375 INJECTION, POWDER, FOR SOLUTION INTRAVENOUS at 21:31

## 2017-07-03 RX ADMIN — Medication: at 11:28

## 2017-07-03 RX ADMIN — HYDROMORPHONE HYDROCHLORIDE 1 MG: 1 INJECTION, SOLUTION INTRAMUSCULAR; INTRAVENOUS; SUBCUTANEOUS at 01:19

## 2017-07-03 RX ADMIN — OCTREOTIDE ACETATE 50 MCG: 50 INJECTION, SOLUTION INTRAVENOUS; SUBCUTANEOUS at 18:44

## 2017-07-03 RX ADMIN — LORAZEPAM 1 MG: 2 INJECTION INTRAMUSCULAR; INTRAVENOUS at 00:57

## 2017-07-03 RX ADMIN — CALCIUM GLUCONATE: 94 INJECTION, SOLUTION INTRAVENOUS at 18:57

## 2017-07-03 RX ADMIN — LORAZEPAM 1 MG: 2 INJECTION INTRAMUSCULAR; INTRAVENOUS at 11:38

## 2017-07-03 RX ADMIN — PIPERACILLIN SODIUM,TAZOBACTAM SODIUM 3.38 G: 3; .375 INJECTION, POWDER, FOR SOLUTION INTRAVENOUS at 06:15

## 2017-07-03 RX ADMIN — Medication 10 ML: at 11:40

## 2017-07-03 RX ADMIN — ENOXAPARIN SODIUM 40 MG: 40 INJECTION SUBCUTANEOUS at 23:16

## 2017-07-03 RX ADMIN — HYDROMORPHONE HYDROCHLORIDE 1 MG: 1 INJECTION, SOLUTION INTRAMUSCULAR; INTRAVENOUS; SUBCUTANEOUS at 10:43

## 2017-07-03 RX ADMIN — OCTREOTIDE ACETATE 50 MCG: 50 INJECTION, SOLUTION INTRAVENOUS; SUBCUTANEOUS at 21:31

## 2017-07-03 RX ADMIN — HUMAN INSULIN 2 UNITS: 100 INJECTION, SOLUTION SUBCUTANEOUS at 12:00

## 2017-07-03 RX ADMIN — Medication 10 ML: at 10:47

## 2017-07-03 RX ADMIN — Medication 10 ML: at 18:49

## 2017-07-03 RX ADMIN — Medication: at 20:26

## 2017-07-03 NOTE — PROGRESS NOTES
Bedside shift change report given to Inocente Bryan RN (oncoming nurse) by MAURI Aleman (offgoing nurse).  Report included the following information SBAR, MAR, Accordion, Recent Results and Cardiac Rhythm ST.

## 2017-07-03 NOTE — PROGRESS NOTES
[facility] General Surgery    POD #5    Subjective     No acute events, NPO, NGT, having pain, but also issues with over sedation previously, awake and alert today    Objective     Patient Vitals for the past 24 hrs:   Temp Pulse Resp BP SpO2   07/03/17 0855 98.4 °F (36.9 °C) (!) 102 12 (!) 152/92 100 %   07/03/17 0341 - 99 - - -   07/03/17 0312 98.4 °F (36.9 °C) (!) 109 16 149/85 95 %   07/02/17 2315 98.8 °F (37.1 °C) (!) 110 16 156/72 97 %   07/02/17 1916 98.6 °F (37 °C) (!) 107 16 151/87 97 %   07/02/17 1857 98.2 °F (36.8 °C) (!) 105 14 138/85 97 %   07/02/17 1612 98.7 °F (37.1 °C) (!) 108 14 (!) 144/93 97 %   07/02/17 1500 - (!) 116 13 140/86 97 %   07/02/17 1400 - (!) 106 14 132/80 96 %   07/02/17 1300 - (!) 107 12 122/82 95 %   07/02/17 1200 98.7 °F (37.1 °C) (!) 116 13 123/86 95 %   07/02/17 1100 - (!) 102 12 127/88 97 %   07/02/17 1000 - (!) 110 13 135/88 98 %         Date 07/02/17 0700 - 07/03/17 0659 07/03/17 0700 - 07/04/17 0659   Shift 7822-4971 0602-5855 24 Hour Total 0791-0452 2900-0250 24 Hour Total   I  N  T  A  K  E   P.O. 240  240         P. O. 240  240       I.V.  (mL/kg/hr) 994  (0.5) 1589.5  (0.9) 2583.5  (0.7)         Volume (anidulafungin (ERAXIS) 100 mg in 0.9% sodium chloride 130 mL IVPB) 130 130 260         Volume (potassium chloride 10 mEq in 100 ml IVPB) 200  200         Volume (vancomycin (VANCOCIN) 1750 mg in  ml infusion)  500 500         Volume (piperacillin-tazobactam (ZOSYN) 3.375 g in 0.9% sodium chloride (MBP/ADV) 100 mL)  200 200         Volume (TPN ADULT - CENTRAL) 664  664         Volume (TPN ADULT - CENTRAL)  759.5 759.5       Shift Total  (mL/kg) 1234  (8.1) 1589.5  (10.2) 2823.5  (18.1)      O  U  T  P  U  T   Urine  (mL/kg/hr) 1050  (0.6) 450  (0.2) 1500  (0.4)         Urine Voided          Urine Occurrence(s)  3 x 3 x         Urine Output (mL) (External Female Catheter 06/30/17) 450  450       Emesis/NG output          Output (ml) (Nasogastric Tube 06/28/17)        Drains 460 260 720 200  200      Output (ml) (Drain Midline abdominal- external ostomy collection bag 07/01/17 Anterior; Upper Abdomen) 390 250 640 200  200      Output (ml) (Jasbir-Ayala Drain 06/28/17 Abdomen) 70 10 80       Stool 0 10 10         Output (ml) (Ileostomy 06/07/00 Lower  Abdomen) 0 10 10       Shift Total  (mL/kg) 1910  (12.5) 1520  (9.8) 3430  (22) 200  (1.3)  200  (1.3)   NET -676 69.5 -606.5 -200  -200   Weight (kg) 153.1 155.8 155.8 155.8 155.8 155.8       PE  Pulm - CTAB  CV - RRR  Abd - soft, ND, BS present, incision with wound manager present and enteric contents, ileostomy viable and enteric contents, CHRIS minimal out  Ext - left antecubital fossa with hematoma present, no pulse in the hematoma, pulse distal radius present    Labs  Recent Results (from the past 12 hour(s))   GLUCOSE, POC    Collection Time: 07/03/17 12:34 AM   Result Value Ref Range    Glucose (POC) 180 (H) 65 - 100 mg/dL    Performed by Carl Mello (MAAME)    MAGNESIUM    Collection Time: 07/03/17  4:00 AM   Result Value Ref Range    Magnesium 1.8 1.6 - 2.4 mg/dL   PHOSPHORUS    Collection Time: 07/03/17  4:00 AM   Result Value Ref Range    Phosphorus 4.5 2.6 - 4.7 MG/DL   METABOLIC PANEL, BASIC    Collection Time: 07/03/17  4:00 AM   Result Value Ref Range    Sodium 147 (H) 136 - 145 mmol/L    Potassium 3.6 3.5 - 5.1 mmol/L    Chloride 115 (H) 97 - 108 mmol/L    CO2 27 21 - 32 mmol/L    Anion gap 5 5 - 15 mmol/L    Glucose 133 (H) 65 - 100 mg/dL    BUN 21 (H) 6 - 20 MG/DL    Creatinine 1.42 (H) 0.55 - 1.02 MG/DL    BUN/Creatinine ratio 15 12 - 20      GFR est AA 50 (L) >60 ml/min/1.73m2    GFR est non-AA 41 (L) >60 ml/min/1.73m2    Calcium 8.9 8.5 - 10.1 MG/DL   GLUCOSE, POC    Collection Time: 07/03/17  6:11 AM   Result Value Ref Range    Glucose (POC) 115 (H) 65 - 100 mg/dL    Performed by Carl Mello (MAAME)          Assessment Brook Porras is a 46 y. o.yr old female s/p exploratory laparotomy, lysis of adhesions for ischemic bowel and SMA stenting    Plan     Keeping her NPO, NGT for now, ice chips are ok  Renewed TPN, added a little insulin, continue octreotide  Consulted palliative care for pain management, requiring a lot of pain meds and difficult to control and not over sedate  OOB more  Wound manager and CHRIS emptying the fistula well, continue local wound care  Cont abx  CBC pending today  Left arm with hematoma from the access site from the SMA stent placement and angiogram, do warm packs TID to the arm.     Alexis Edmonds MD

## 2017-07-03 NOTE — PROGRESS NOTES
NUTRITION       Recommendations:  1. Add lipids, increase TPN go goal and adjust to cyclic regimen (may need to increase volume further pending drain output)  -- GOAL TPN:   5%AA D20 @ 85 mL/hr x 1 hour   @ 170 mL/hr x 12 hrs   @ 85 mL/hr x last hour  + 20% lipids, 500 mL 3x/week  + Check BG 2 hours into infusion and 1 hour after infusion is completed    2. Daily weights    Chart reviewed, discussed with RN, MD and team during interdisciplinary rounds. Pt remains on TPN as sole source of nutrition. She is NPO with ice chips. Per RN, pt eating a lot of ice chips. Will need to document ice as intake volume to account in NGT output. Surgical intervention is not indicated at this time secondary to frozen abdomen. Pt will remain on TPN, so would increase to goal concentrations and recommend cyclic regimen as above to help prevent cholestasis. Goal to provide 2374 kcal, 111 g protein in 2210 mL-- meeting 95% and 100% of estimated kcal and protein needs, respectively. May need to to increase volume further to make up for drain output. Current TPN (as above) is providing 1755 kcal, 100 g protein in 1992 mL-- meeting 70% and 91% of estimated kcal and protein needs, respectively. NGT Output:   6/30-7/1: 850 mL  7/1-7/2: 1550 mL  7/2-7/3: 1200 mL so far    Estimated needs recalculated since pt has been extubated. Estimated Nutrition Needs:   Kcals/day: 3474 Kcals/day (1189-4955 kcal/day [facility]. Jeor x 1.2-1.3))  Protein: 110 g (2.0g/kg IBW)  Fluid:  (1  ml/kcal)     Based On: Kristen Ozuna  Weight Used: Actual wt (143.4 kg (wt on admit))    RD to follow.     [address]

## 2017-07-03 NOTE — PROGRESS NOTES
ID Progress Note  7/3/2017    Subjective:     Out of the ICU    Objective:     Antibiotics:  1. Vancomycin  2. Zosyn       Vitals:   Visit Vitals    BP (!) 153/98 (BP 1 Location: Left arm, BP Patient Position: At rest)    Pulse (!) 113    Temp 97.5 °F (36.4 °C)    Resp 16    Ht 5' 4\" (1.626 m)    Wt 155.8 kg (343 lb 7.6 oz)    LMP 2017 (Approximate)    SpO2 98%    Breastfeeding No    BMI 58.96 kg/m2        Tmax:  Temp (24hrs), Av.4 °F (36.9 °C), Min:97.5 °F (36.4 °C), Max:98.8 °F (37.1 °C)      Exam:  Lungs:  clear to auscultation bilaterally  Heart:  regularly irregular rhythm  Abdomen:  abnormal findings:  tenderness moderate in the entire abdomen, hypoactive bowel sounds    Labs:      Recent Labs      17   1122  17   0400  17   0033  17   0423   WBC  17.4*   --   21.6*  26.0*   HGB  8.4*   --   8.7*  8.4*   PLT  405*   --   321  283   BUN   --   21*  16  12   CREA   --   1.42*  1.34*  1.51*   SGOT   --    --   23  16   AP   --    --   88  88   TBILI   --    --   0.3  0.4       Cultures:     Lab Results   Component Value Date/Time    Specimen Description: URINE 2009 07:45 PM     Lab Results   Component Value Date/Time    Culture result: NO GROWTH ON SOLID MEDIA AT 4 DAYS 2017 12:17 PM    Culture result:  2017 12:17 PM     **METHICILLIN RESISTANT STAPHYLOCOCCUS AUREUS**  ISOLATED FROM [facility]IO [facility] ONLY      Culture result: NO GROWTH 5 DAYS 2017 02:35 PM       Radiology:     Line/Insert Date:           Assessment:     1. Ischemic gut with frozen abdomen  2. Leukocytosis--slight improvement  3. Cultures negative to date    Objective:     1. Continue IV therapy  2.  Follow up cultures and studies    Layton Corley MD

## 2017-07-03 NOTE — PROGRESS NOTES
Patient was discussed during IDR this am. CM noted patient was transferred from ICU following a [address], SMA STENT LEFT on 6/29/17. Patient remains on TPN. NPO and NGT in place. Wound manager and J-P draining well. Palliative Care involved for pain management. Patient has an ileostomy, draining enteric contents. Disposition plan will be determined when patient is medically stable. CM will continue to follow for discharge needs.  Lieutenant Melinda MSA, RN, CRM

## 2017-07-03 NOTE — PROGRESS NOTES
Palliative Medicine Consult  [place]: [phone] ([phone])    Patient Name: Linda Otoole  YOB: 1975    Date of Initial Consult: 6/27/17  Reason for Consult: overwhelming symptoms  Requesting Provider: Greta Medina NP  Primary Care Physician: Rama Vang MD      SUMMARY:   Linda Otoole is a 46 y.o. with a past history of Crohn's disease,anxiety,  chronic pain, hx DVT, multiple (4) small bowel resections, s/p recent urgent dx lap, with lysis of adhesions, repair of suspected perf 6/7/17 , who was admitted on 6/25/2017 from home with a diagnosis of worsening abdominal pain, elevated WBC and MRSA wound infection. Initially consulted for pain management thought to be possible Crohn's flare. Initial CT on 6/25 w/out acute findings but repeat on 6/28 showing ischemic bowl. S/p ex lap but ischemic bowl unresectable, s/p SMA stent. Extubated on 6/30. Patient is followed chronically for pain management by Dr. Angeles Marcos at [address] [place] [address]. Home regimen for chronic abdominal pain is MS Contin 60 Q8 and MS IR 30 Q6 PRN. She also takes Xanax 1mg TID prn anxiety.  reviewed, opioids last prescribed on 5/19/17. PALLIATIVE DIAGNOSES:     1. Abd pain, acute on chronic   2. Anxiety, acute on chronic   3. Nausea  4. Crohn's disease  5. Chronic constipation on home relistor   6. MRSA wound        PLAN:   1. Pt currently npo, pain not managed very well, has some anxiety. No sedation or confusion per mother, but some noted over the weekend. 2. Pt does best w/ Morphine she thinks, as this is her home regimen. Aware that we need to be cautious, due to her acute illness. 3. PCA reviewed, Cont Morphine PCA 4mg/h basal, increase to 5mg every 10 min demand. Per RN staff, PCA cannot go higher than this. Pt feels very comfortable w/ this plan. 4. No increase in basal, as was oversedated before. 5. Addendum 250pm: Spoke w/ RN Zenaida Iniguez, pt still very anxious, tearful.  Pt certainly has reason for severe pain, but also think that has psycho emotional component. If pain is not improved w/  visit to support and w/ incr in Morphine- then tmrw recommend opioid rotating to Dilaudid and I will request my colleague on call do this. Consider Dilaudid 0.4mg/h (be cautious w/ basal) and 1mg every 10 min demand. 6. Can cont back up IV Dilaudid for pain not controlled by PCA. 7. Monitor for sedation, does have narcan order. 8. Support emotionally as well. 9. When can tolerate po will help adjust back to po medications incl home MSContin. May also benefit from low dose gabapentin for some neuropathic incisional pain. 10. Bowel regimen per surgery team.   11. Communicated plan of care with: Palliative IDT; left message for Dr Idania Browne who manages pt's chronic pain per pt request      GOALS OF CARE / TREATMENT PREFERENCES:   [====Goals of Care====]  GOALS OF CARE:  Patient / health care proxy stated goals: pain control  Recovery from acute issues      TREATMENT PREFERENCES:   Code Status: Full Code    Advance Care Planning:  Advance Care Planning 6/26/2017   Patient's Healthcare Decision Maker is: Legal Next of Camilo 71   Primary Decision Maker Name Hakeem Parham   Primary Decision Maker Phone Number [phone]   Primary Decision Maker Relationship to Patient Parent   Confirm Advance Directive None   Patient Would Like to Complete Advance Directive No       Other:    The palliative care team has discussed with patient / health care proxy about goals of care / treatment preferences for patient.  [====Goals of Care====]         HISTORY:         HPI/SUBJECTIVE:    The patient is:   [] Verbal and participatory  [x] Non-participatory due to: Medical condition     Pt doing better since last week, but pain significant generalized in abdomen and has some n/t in incision site. No nausea, but still w/ NGT.         Clinical Pain Assessment (nonverbal scale for severity on nonverbal patients):   [++++ Clinical Pain Assessment++++]  [++++Pain Severity++++]: Pain: 8  [++++Pain Character++++]: sharp and burning, stabbing  [++++Pain Duration++++]: several days  [++++Pain Effect++++]: hard to walk, feeling anxious  [++++Pain Factors++++]: better after pain medicaion  [++++Pain Frequency++++]: constant  [++++Pain Location++++]: across abdomen both sides in middle at incision site  [++++ Clinical Pain Assessment++++]     FUNCTIONAL ASSESSMENT:     Palliative Performance Scale (PPS):  PPS: 50       PSYCHOSOCIAL/SPIRITUAL SCREENING:     Advance Care Planning:  Advance Care Planning 6/26/2017   Patient's Healthcare Decision Maker is: Legal Next of Camilo 71   Primary Decision Maker Name Abimael Arguello   Primary Decision Maker Phone Number [phone]   Primary Decision Maker Relationship to Patient Parent   Confirm Advance Directive None   Patient Would Like to Complete Advance Directive No        Any spiritual / Hinduism concerns:  [] Yes /  [x] No    Caregiver Burnout:  [] Yes /  [x] No /  [] No Caregiver Present      Anticipatory grief assessment:   [x] Normal  / [] Maladaptive       ESAS Anxiety: Anxiety: 0    ESAS Depression: Depression: 0        REVIEW OF SYSTEMS:     Positive and pertinent negative findings in ROS are noted above in HPI. The following systems were [x] reviewed / [] unable to be reviewed as noted in HPI  Other findings are noted below. Systems: constitutional, ears/nose/mouth/throat, respiratory, gastrointestinal, genitourinary, musculoskeletal, integumentary, neurologic, psychiatric, endocrine. Positive findings noted below.   Modified ESAS Completed by: provider   Fatigue: 6 Drowsiness: 0   Depression: 0 Pain: 8   Anxiety: 0 Nausea: 0   Anorexia: 0 Dyspnea: 0     Constipation: No              PHYSICAL EXAM:     From RN flowsheet:  Wt Readings from Last 3 Encounters:   07/03/17 343 lb 7.6 oz (155.8 kg)   06/25/17 343 lb (155.6 kg)   06/22/17 343 lb (155.6 kg)     Blood pressure 152/89, pulse (!) 105, temperature 98.2 °F (36.8 °C), resp. rate 16, height 5' 4\" (1.626 m), weight 343 lb 7.6 oz (155.8 kg), last menstrual period 05/21/2017, SpO2 96 %, not currently breastfeeding. Pain Scale 1: Numeric (0 - 10)  Pain Intensity 1: 8  Pain Onset 1: Acute; incisional  Pain Location 1: Abdomen  Pain Orientation 1: Anterior  Pain Description 1: Burning, Sharp, Shooting  Pain Intervention(s) 1: Medication (see MAR), Family Support, Encouraged PCA, Relaxation technique, Repositioned, Therapeutic presence, Emotional support  Last bowel movement, if known: stool in ostomy     Constitutional:awake, alert, oriented, tearful and axious   Eyes: pupils equal, anicteric  ENMT: no nasal discharge, moist mucous membranes  Respiratory: breathing not labored  Gastrointestinal: midline incision w/ dressing in place,  +ostomy w/out stool, hypoactive bowel sounds   Musculoskeletal: no deformity, no tenderness to palpation  Skin: warm, dry  No edema  Neurologic: moving all extremities  Psych: anxious      HISTORY:     Active Problems:    Abdominal pain (6/25/2017)      Past Medical History:   Diagnosis Date    Crohn's disease ([facility] 8/15/2011    DVT (deep venous thrombosis) ([facility] 8/15/2011    Incarcerated ventral hernia 8/15/2011    Right flank pain 8/22/2011    Seizures ([facility]     Stroke [facility])       Past Surgical History:   Procedure Laterality Date    HX OTHER SURGICAL      multiple procedures related to her Crohn's disease    TX LAP, INCISIONAL HERNIA REPAIR,INCARCERATED  9-10-08    dr. Ulices Dutta      Family History   Problem Relation Age of Onset    Hypertension Father     Stroke Father     Other Father      arthritis      History reviewed, no pertinent family history.   Social History   Substance Use Topics    Smoking status: Former Smoker    Smokeless tobacco: Not on file    Alcohol use No     Allergies   Allergen Reactions    Demerol [Meperidine] Unknown (comments)    Soma [Carisoprodol] Rash and Nausea Only    Sulfa (Sulfonamide Antibiotics) Unknown (comments)    Toradol [Ketorolac Tromethamine] Unknown (comments)      Current Facility-Administered Medications   Medication Dose Route Frequency    TPN ADULT - CENTRAL   IntraVENous CONTINUOUS    TPN ADULT - CENTRAL   IntraVENous CONTINUOUS    vancomycin (VANCOCIN) 1750 mg in  ml infusion  1,750 mg IntraVENous Q24H    octreotide (SANDOSTATIN) injection 50 mcg  50 mcg IntraVENous TID    HYDROmorphone (PF) (DILAUDID) injection 1 mg  1 mg IntraVENous Q3H PRN    insulin regular (NOVOLIN R, HUMULIN R) injection   SubCUTAneous Q6H    morphine (PF)  mg/30 ml   IntraVENous CONTINUOUS    glucose chewable tablet 16 g  4 Tab Oral PRN    glucagon (GLUCAGEN) injection 1 mg  1 mg IntraMUSCular PRN    dextrose 10 % infusion 125-250 mL  125-250 mL IntraVENous PRN    LORazepam (ATIVAN) injection 1 mg  1 mg IntraVENous Q12H    pantoprazole (PROTONIX) 40 mg in sodium chloride 0.9 % 10 mL injection  40 mg IntraVENous DAILY    albuterol-ipratropium (DUO-NEB) 2.5 MG-0.5 MG/3 ML  3 mL Nebulization Q6H PRN    clopidogrel (PLAVIX) tablet 75 mg  75 mg Per NG tube DAILY    prochlorperazine (COMPAZINE) with saline injection 5 mg  5 mg IntraVENous Q6H PRN    anidulafungin (ERAXIS) 100 mg in 0.9% sodium chloride 130 mL IVPB  100 mg IntraVENous Q24H    ondansetron (ZOFRAN) injection 8 mg  8 mg IntraVENous Q6H PRN    sodium chloride (NS) flush 5-10 mL  5-10 mL IntraVENous Q8H    sodium chloride (NS) flush 5-10 mL  5-10 mL IntraVENous PRN    sodium chloride (NS) flush 5-10 mL  5-10 mL IntraVENous Q8H    sodium chloride (NS) flush 5-10 mL  5-10 mL IntraVENous PRN    naloxone (NARCAN) injection 0.4 mg  0.4 mg IntraVENous PRN    diphenhydrAMINE (BENADRYL) injection 12.5 mg  12.5 mg IntraVENous Q4H PRN    enoxaparin (LOVENOX) injection 40 mg  40 mg SubCUTAneous Q24H    piperacillin-tazobactam (ZOSYN) 3.375 g in 0.9% sodium chloride (MBP/ADV) 100 mL  3.375 g IntraVENous Q8H    Vancomycin ---Pharmacy to Dose   Other Rx Dosing/Monitoring          LAB AND IMAGING FINDINGS:     Lab Results   Component Value Date/Time    WBC 17.4 07/03/2017 11:22 AM    HGB 8.4 07/03/2017 11:22 AM    PLATELET 629 39/56/7536 11:22 AM     Lab Results   Component Value Date/Time    Sodium 147 07/03/2017 04:00 AM    Potassium 3.6 07/03/2017 04:00 AM    Chloride 115 07/03/2017 04:00 AM    CO2 27 07/03/2017 04:00 AM    BUN 21 07/03/2017 04:00 AM    Creatinine 1.42 07/03/2017 04:00 AM    Calcium 8.9 07/03/2017 04:00 AM    Magnesium 1.8 07/03/2017 04:00 AM    Phosphorus 4.5 07/03/2017 04:00 AM      Lab Results   Component Value Date/Time    AST (SGOT) 23 07/02/2017 12:33 AM    Alk. phosphatase 88 07/02/2017 12:33 AM    Protein, total 6.9 07/02/2017 12:33 AM    Albumin 1.7 07/02/2017 12:33 AM    Globulin 5.2 07/02/2017 12:33 AM     No results found for: INR, PTMR, PTP, PT1, PT2, APTT   No results found for: IRON, FE, TIBC, IBCT, PSAT, FERR   No results found for: PH, PCO2, PO2  No components found for: GLPOC   No results found for: CPK, CKMB             Total time:   Counseling / coordination time, spent as noted above:    > 50% counseling / coordination?:     Prolonged service was provided for  []30 min   []75 min in face to face time in the presence of the patient, spent as noted above. Time Start:   Time End:   Note: this can only be billed with 01954 (initial) or 55668 (follow up). If multiple start / stop times, list each separately.

## 2017-07-03 NOTE — PROGRESS NOTES
Bedside and Verbal shift change report given to andrea (oncoming nurse) by Mee Mcallister (offgoing nurse). Report included the following information SBAR, Kardex, OR Summary, Procedure Summary, Intake/Output, MAR, Recent Results and Cardiac Rhythm sinus tach.

## 2017-07-03 NOTE — PROGRESS NOTES
GI PROGRESS NOTE    NAME:             Christina Cassidy   :              1975   MRN:              [id number]   Admit Date:     2017  Todays Date:  7/3/2017      Subjective:          Nausea:no NPO   Vomiting:no   Diarrhea: ostomy drainage      Current Facility-Administered Medications   Medication Dose Route Frequency    TPN ADULT - CENTRAL   IntraVENous CONTINUOUS    vancomycin (VANCOCIN) 1750 mg in  ml infusion  1,750 mg IntraVENous Q24H    octreotide (SANDOSTATIN) injection 50 mcg  50 mcg IntraVENous TID    HYDROmorphone (PF) (DILAUDID) injection 1 mg  1 mg IntraVENous Q3H PRN    insulin regular (NOVOLIN R, HUMULIN R) injection   SubCUTAneous Q6H    morphine (PF)  mg/30 ml   IntraVENous CONTINUOUS    glucose chewable tablet 16 g  4 Tab Oral PRN    glucagon (GLUCAGEN) injection 1 mg  1 mg IntraMUSCular PRN    dextrose 10 % infusion 125-250 mL  125-250 mL IntraVENous PRN    LORazepam (ATIVAN) injection 1 mg  1 mg IntraVENous Q12H    pantoprazole (PROTONIX) 40 mg in sodium chloride 0.9 % 10 mL injection  40 mg IntraVENous DAILY    albuterol-ipratropium (DUO-NEB) 2.5 MG-0.5 MG/3 ML  3 mL Nebulization Q6H PRN    clopidogrel (PLAVIX) tablet 75 mg  75 mg Per NG tube DAILY    prochlorperazine (COMPAZINE) with saline injection 5 mg  5 mg IntraVENous Q6H PRN    anidulafungin (ERAXIS) 100 mg in 0.9% sodium chloride 130 mL IVPB  100 mg IntraVENous Q24H    ondansetron (ZOFRAN) injection 8 mg  8 mg IntraVENous Q6H PRN    sodium chloride (NS) flush 5-10 mL  5-10 mL IntraVENous Q8H    sodium chloride (NS) flush 5-10 mL  5-10 mL IntraVENous PRN    sodium chloride (NS) flush 5-10 mL  5-10 mL IntraVENous Q8H    sodium chloride (NS) flush 5-10 mL  5-10 mL IntraVENous PRN    naloxone (NARCAN) injection 0.4 mg  0.4 mg IntraVENous PRN    diphenhydrAMINE (BENADRYL) injection 12.5 mg  12.5 mg IntraVENous Q4H PRN    enoxaparin (LOVENOX) injection 40 mg  40 mg SubCUTAneous Q24H  piperacillin-tazobactam (ZOSYN) 3.375 g in 0.9% sodium chloride (MBP/ADV) 100 mL  3.375 g IntraVENous Q8H    Vancomycin ---Pharmacy to Dose   Other Rx Dosing/Monitoring        Objective:     Patient Vitals for the past 8 hrs:   BP Temp Pulse Resp SpO2 Weight   07/03/17 0855 (!) 152/92 98.4 °F (36.9 °C) (!) 102 12 100 % -   07/03/17 0341 - - 99 - - 155.8 kg (343 lb 7.6 oz)   07/03/17 0312 149/85 98.4 °F (36.9 °C) (!) 109 16 95 % -     07/03 0701 - 07/03 1900  In: -   Out: 200 [Drains:200]  07/01 1901 - 07/03 0700  In: 4392.6 [P.O.:240; I.V.:4152.6]  Out: 5890 [Urine:2700; Drains:1130]    EXAM:    Visit Vitals    BP (!) 152/92 (BP 1 Location: Left arm)    Pulse (!) 102    Temp 98.4 °F (36.9 °C)    Resp 12    Ht 5' 4\" (1.626 m)    Wt 155.8 kg (343 lb 7.6 oz)    LMP 05/21/2017 (Approximate)    SpO2 100%    Breastfeeding No    BMI 58.96 kg/m2     General:                    Cooperative  Neurologic:                Alert and oriented X 3. HEENT:                     PERRLA, EOMI.    Lungs:                                 Coarse post extubation  Heart:                                  s1 s2  Abdomen:                  Tender, distended, BS hypo, ostomies with drainage  Extremities:               edema  Psych:                       Anxious        Data Review     Recent Labs      07/02/17 0033 07/01/17 0423   WBC  21.6*  26.0*   HGB  8.7*  8.4*   HCT  28.1*  26.7*   PLT  321  283     Recent Labs      07/03/17   0400  07/02/17 0033   NA  147*  149*   K  3.6  3.2*   CL  115*  115*   CO2  27  26   BUN  21*  16   CREA  1.42*  1.34*   GLU  133*  130*   PHOS  4.5  3.6   CA  8.9  8.4*     Recent Labs      07/02/17 0033 07/01/17 0423   SGOT  23  16   AP  88  88   TP  6.9  6.2*   ALB  1.7*  1.6*   GLOB  5.2*  4.6*                              Assessment:   · Ischemic bowel s/p SMA stenting  · Crohn's Disease s/p ex lap         Active Problems:    Abdominal pain (6/25/2017)        Plan:   · Supportive care  · Theresa Walters MD

## 2017-07-03 NOTE — PROGRESS NOTES
General Surgery at [facility]    Pt complained of poor pain control. I had given suggestions to our PA for changing PCA, not knowing that Dr. Patricia Lucas had ordered  [address] consult. Pt also complains of pain and feeling tired and wants to use a bedpan instead of getting up to commode. RN reports her NG output is high due to large amount of ice chips ingested. Patient Vitals for the past 12 hrs:   Temp Pulse Resp BP SpO2   17 1536 97.5 °F (36.4 °C) (!) 113 16 (!) 153/98 98 %   17 1202 98.2 °F (36.8 °C) (!) 105 16 152/89 96 %   17 0855 98.4 °F (36.9 °C) (!) 102 12 (!) 152/92 100 %     Temp (24hrs), Av.3 °F (36.8 °C), Min:97.5 °F (36.4 °C), Max:98.8 °F (37.1 °C)       Date 17 0700 - 17 0659   Shift 0760-2089 8138-7975 2390-2075 24 Hour Total   I  N  T  A  K  E   Shift Total  (mL/kg)       O  U  T  P  U  T   Drains 200   200    Shift Total  (mL/kg) 200  (1.3)   200  (1.3)   Weight (kg) 155.8 155.8 155.8 155.8     Gen Mild distress  Abd Obese, soft, tenderness at pannus and upper abdomen   [place] drainage is bilious    Assessment:   Hospital Problems as of 7/3/2017  Date Reviewed: 2017          Codes Class Noted - Resolved POA    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 The stomach and proximal small bowel loops are distended. There is a loop of  small bowel in the midabdomen which is mildly dilated and does not demonstrate  enhancement in the wall and there is suspicion of pneumatosis and this leads to  a loop of small bowel which demonstrates thickening of the wall and findings are  suspicious for ischemia. 2. There is extensive mesenteric edema and a small amount of ascites in the  pelvis.              Superior mesenteric artery thrombosis ([facility] [address] ICD-10-CM: R99.713  ICD-9-CM: 557.0  6/28/2017 - Present Clinically Undetermined    Overview Signed 7/3/2017  5:09 PM by David Abad MD     CT abd/pelvis 6/28/17:  3. There is nonocclusive thrombus in the SMA. Abdominal pain ICD-10-CM: R10.9  ICD-9-CM: 789.00  6/25/2017 - Present Yes              5 Days Post-Op  Procedure(s):  LAPAROTOMY EXPLORATORY BOWEL RESECTION, SMA STENT LEFT     Unclear why she needs NG when majority of output is ice chips. Seems to be a source of significant discomfort. Also seems to have a high anxiety level that RN reports is inadequately treated with IV lorazepam.  Palliative Care is managing PCA.     Rec/Plan:  Dc NG  Sips clears  Lorazepam 2 mg po q 8 h      Signed By: David Abad MD     July 3, 2017

## 2017-07-03 NOTE — PROGRESS NOTES
Follow-up visit with patient in 433 per request of [address] [place]. Consulted with RN Zenaida Iniguez. Provided empathetic listening and supportive presence to patient as she shared that she was having a rough day due to pain and feelings of embarrassment related to a loss of independence and control. Explored events that contributed to these feelings and helped to normalize her experience. Patient's mother and friend were also present and provided background information which included the patient's recent multiple hospitalizations. Patient was emotional and anxious during our visit and appeared to take direction and behavioral cues from her mother. Explored with family how the multiple hospitalizations have impacted the family as a whole. Visit concluded when patient's physician entered. Advised patient and family of  availability for continued support as needed and desired. Chaplain Nirmal Smith M.Div.    Paging Service [phone])

## 2017-07-04 LAB
ANION GAP BLD CALC-SCNC: 10 MMOL/L (ref 5–15)
BUN SERPL-MCNC: 24 MG/DL (ref 6–20)
BUN/CREAT SERPL: 15 (ref 12–20)
CALCIUM SERPL-MCNC: 9.3 MG/DL (ref 8.5–10.1)
CHLORIDE SERPL-SCNC: 111 MMOL/L (ref 97–108)
CO2 SERPL-SCNC: 24 MMOL/L (ref 21–32)
CREAT SERPL-MCNC: 1.57 MG/DL (ref 0.55–1.02)
GLUCOSE BLD STRIP.AUTO-MCNC: 135 MG/DL (ref 65–100)
GLUCOSE BLD STRIP.AUTO-MCNC: 138 MG/DL (ref 65–100)
GLUCOSE BLD STRIP.AUTO-MCNC: 156 MG/DL (ref 65–100)
GLUCOSE SERPL-MCNC: 68 MG/DL (ref 65–100)
MAGNESIUM SERPL-MCNC: 1.8 MG/DL (ref 1.6–2.4)
PHOSPHATE SERPL-MCNC: 4.5 MG/DL (ref 2.6–4.7)
POTASSIUM SERPL-SCNC: 3.9 MMOL/L (ref 3.5–5.1)
SERVICE CMNT-IMP: ABNORMAL
SODIUM SERPL-SCNC: 145 MMOL/L (ref 136–145)

## 2017-07-04 PROCEDURE — 82962 GLUCOSE BLOOD TEST: CPT

## 2017-07-04 PROCEDURE — 80048 BASIC METABOLIC PNL TOTAL CA: CPT | Performed by: SURGERY

## 2017-07-04 PROCEDURE — 74011250636 HC RX REV CODE- 250/636: Performed by: PHYSICAL MEDICINE & REHABILITATION

## 2017-07-04 PROCEDURE — C9113 INJ PANTOPRAZOLE SODIUM, VIA: HCPCS | Performed by: SURGERY

## 2017-07-04 PROCEDURE — 83735 ASSAY OF MAGNESIUM: CPT | Performed by: SURGERY

## 2017-07-04 PROCEDURE — 74011250636 HC RX REV CODE- 250/636: Performed by: INTERNAL MEDICINE

## 2017-07-04 PROCEDURE — 74011000250 HC RX REV CODE- 250: Performed by: SURGERY

## 2017-07-04 PROCEDURE — 74011250637 HC RX REV CODE- 250/637: Performed by: SURGERY

## 2017-07-04 PROCEDURE — 74011000258 HC RX REV CODE- 258: Performed by: INTERNAL MEDICINE

## 2017-07-04 PROCEDURE — 36592 COLLECT BLOOD FROM PICC: CPT

## 2017-07-04 PROCEDURE — 74011250636 HC RX REV CODE- 250/636: Performed by: SURGERY

## 2017-07-04 PROCEDURE — 74011000258 HC RX REV CODE- 258: Performed by: SURGERY

## 2017-07-04 PROCEDURE — 3331090002 HH PPS REVENUE DEBIT

## 2017-07-04 PROCEDURE — 36415 COLL VENOUS BLD VENIPUNCTURE: CPT | Performed by: SURGERY

## 2017-07-04 PROCEDURE — 74011636637 HC RX REV CODE- 636/637: Performed by: SURGERY

## 2017-07-04 PROCEDURE — 3331090001 HH PPS REVENUE CREDIT

## 2017-07-04 PROCEDURE — 65660000000 HC RM CCU STEPDOWN

## 2017-07-04 PROCEDURE — 77030018836 HC SOL IRR NACL ICUM -A

## 2017-07-04 PROCEDURE — 84100 ASSAY OF PHOSPHORUS: CPT | Performed by: SURGERY

## 2017-07-04 RX ADMIN — SODIUM CHLORIDE 100 MG: 900 INJECTION, SOLUTION INTRAVENOUS at 18:30

## 2017-07-04 RX ADMIN — PIPERACILLIN SODIUM,TAZOBACTAM SODIUM 3.38 G: 3; .375 INJECTION, POWDER, FOR SOLUTION INTRAVENOUS at 14:29

## 2017-07-04 RX ADMIN — SODIUM CHLORIDE 40 MG: 9 INJECTION INTRAMUSCULAR; INTRAVENOUS; SUBCUTANEOUS at 10:07

## 2017-07-04 RX ADMIN — HYDROMORPHONE HYDROCHLORIDE 1 MG: 1 INJECTION, SOLUTION INTRAMUSCULAR; INTRAVENOUS; SUBCUTANEOUS at 10:58

## 2017-07-04 RX ADMIN — Medication: at 08:08

## 2017-07-04 RX ADMIN — HYDROMORPHONE HYDROCHLORIDE 1 MG: 1 INJECTION, SOLUTION INTRAMUSCULAR; INTRAVENOUS; SUBCUTANEOUS at 22:28

## 2017-07-04 RX ADMIN — VANCOMYCIN HYDROCHLORIDE 1750 MG: 10 INJECTION, POWDER, LYOPHILIZED, FOR SOLUTION INTRAVENOUS at 03:23

## 2017-07-04 RX ADMIN — PIPERACILLIN SODIUM,TAZOBACTAM SODIUM 3.38 G: 3; .375 INJECTION, POWDER, FOR SOLUTION INTRAVENOUS at 05:51

## 2017-07-04 RX ADMIN — PIPERACILLIN SODIUM,TAZOBACTAM SODIUM 3.38 G: 3; .375 INJECTION, POWDER, FOR SOLUTION INTRAVENOUS at 22:28

## 2017-07-04 RX ADMIN — OCTREOTIDE ACETATE 50 MCG: 50 INJECTION, SOLUTION INTRAVENOUS; SUBCUTANEOUS at 22:28

## 2017-07-04 RX ADMIN — Medication 10 ML: at 14:29

## 2017-07-04 RX ADMIN — LORAZEPAM 2 MG: 1 TABLET ORAL at 05:19

## 2017-07-04 RX ADMIN — CLOPIDOGREL BISULFATE 75 MG: 75 TABLET ORAL at 10:07

## 2017-07-04 RX ADMIN — HYDROMORPHONE HYDROCHLORIDE 1 MG: 1 INJECTION, SOLUTION INTRAMUSCULAR; INTRAVENOUS; SUBCUTANEOUS at 04:06

## 2017-07-04 RX ADMIN — HUMAN INSULIN 2 UNITS: 100 INJECTION, SOLUTION SUBCUTANEOUS at 18:00

## 2017-07-04 RX ADMIN — Medication: at 19:53

## 2017-07-04 RX ADMIN — LORAZEPAM 2 MG: 1 TABLET ORAL at 22:28

## 2017-07-04 RX ADMIN — OCTREOTIDE ACETATE 50 MCG: 50 INJECTION, SOLUTION INTRAVENOUS; SUBCUTANEOUS at 12:28

## 2017-07-04 RX ADMIN — Medication 10 ML: at 06:00

## 2017-07-04 RX ADMIN — LORAZEPAM 2 MG: 1 TABLET ORAL at 14:28

## 2017-07-04 RX ADMIN — Medication 10 ML: at 18:20

## 2017-07-04 RX ADMIN — CALCIUM GLUCONATE: 94 INJECTION, SOLUTION INTRAVENOUS at 18:17

## 2017-07-04 NOTE — PROGRESS NOTES
Progress Note    Patient: Honorio Matamoros MRN: [id number]  SSN: [id number]    YOB: 1975  Age: 46 y.o. Sex: female      Admit Date: 2017    6 Days Post-Op    Procedure:  Procedure(s):  LAPAROTOMY EXPLORATORY BOWEL RESECTION, SMA STENT LEFT    Subjective:     No acute surgical issues. Pt with copious leakage from midline EC fistula and very minimal output from ileostomy. Concern this may be a proximal fistula and given Crohn's disease may be unlikely to heal.  Wound care provided. Objective:     Visit Vitals    /90 (BP 1 Location: Right arm, BP Patient Position: At rest)    Pulse (!) 110    Temp 98.4 °F (36.9 °C)    Resp 16    Ht 5' 4\" (1.626 m)    Wt 317 lb 14.5 oz (144.2 kg)    SpO2 98%    Breastfeeding No    BMI 54.57 kg/m2       Temp (24hrs), Av.2 °F (36.8 °C), Min:97.5 °F (36.4 °C), Max:99.1 °F (37.3 °C)        Physical Exam:    Gen:  NAD  Pulm:  Unlabored  Abd:  S/ND/appropriate TTP  Wound:  Bilious drainage. Wound appliance placed.       Recent Results (from the past 12 hour(s))   GLUCOSE, POC    Collection Time: 17 11:20 PM   Result Value Ref Range    Glucose (POC) 190 (H) 65 - 100 mg/dL    Performed by Julius Do    MAGNESIUM    Collection Time: 17  4:30 AM   Result Value Ref Range    Magnesium 1.8 1.6 - 2.4 mg/dL   PHOSPHORUS    Collection Time: 17  4:30 AM   Result Value Ref Range    Phosphorus 4.5 2.6 - 4.7 MG/DL   METABOLIC PANEL, BASIC    Collection Time: 17  4:30 AM   Result Value Ref Range    Sodium 145 136 - 145 mmol/L    Potassium 3.9 3.5 - 5.1 mmol/L    Chloride 111 (H) 97 - 108 mmol/L    CO2 24 21 - 32 mmol/L    Anion gap 10 5 - 15 mmol/L    Glucose 68 65 - 100 mg/dL    BUN 24 (H) 6 - 20 MG/DL    Creatinine 1.57 (H) 0.55 - 1.02 MG/DL    BUN/Creatinine ratio 15 12 - 20      GFR est AA 44 (L) >60 ml/min/1.73m2    GFR est non-AA 37 (L) >60 ml/min/1.73m2    Calcium 9.3 8.5 - 10.1 MG/DL   GLUCOSE, POC    Collection Time: 17 6:42 AM   Result Value Ref Range    Glucose (POC) 138 (H) 65 - 100 mg/dL    Performed by Kimmy Salazar            Assessment:     Hospital Problems  Date Reviewed: 6/28/2017          Codes Class Noted POA    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 The stomach and proximal small bowel loops are distended. There is a loop of  small bowel in the midabdomen which is mildly dilated and does not demonstrate  enhancement in the wall and there is suspicion of pneumatosis and this leads to  a loop of small bowel which demonstrates thickening of the wall and findings are  suspicious for ischemia. 2. There is extensive mesenteric edema and a small amount of ascites in the  pelvis. Superior mesenteric artery thrombosis [facility]) ICD-10-CM: K55.069  ICD-9-CM: 557.0  6/28/2017 Clinically Undetermined    Overview Signed 7/3/2017  5:09 PM by Sarai Clinton MD     CT abd/pelvis 6/28/17:  3. There is nonocclusive thrombus in the SMA.                Abdominal pain ICD-10-CM: R10.9  ICD-9-CM: 789.00  6/25/2017 Yes              Plan/Recommendations/Medical Decision Making:     - Renew TPN  - Continue local wound care  - Labs in am  - Pain control  - Continue antibiotic  - Upper GI with small bowel follow-thru in am to evaluate for location of fistula and distal obstruction    Signed By: Yousif Wood MD     July 4, 2017

## 2017-07-04 NOTE — PROGRESS NOTES
ID Progress Note  2017    Subjective:     Trouble managing ostomy leakage    Objective:     Antibiotics:  1. Vancomycin  2. Zosyn       Vitals:   Visit Vitals    /90 (BP 1 Location: Right arm, BP Patient Position: At rest)    Pulse (!) 110    Temp 98.4 °F (36.9 °C)    Resp 16    Ht 5' 4\" (1.626 m)    Wt 144.2 kg (317 lb 14.5 oz)    LMP 2017 (Approximate)    SpO2 98%    Breastfeeding No    BMI 54.57 kg/m2        Tmax:  Temp (24hrs), Av.2 °F (36.8 °C), Min:97.5 °F (36.4 °C), Max:99.1 °F (37.3 °C)      Exam:  Lungs:  clear to auscultation bilaterally  Heart:  regularly irregular rhythm  Abdomen:  abnormal findings:  tenderness moderate in the entire abdomen, hypoactive bowel sounds    Labs:      Recent Labs      17   0430  17   1122  17   0400  17   0033   WBC   --   17.4*   --   21.6*   HGB   --   8.4*   --   8.7*   PLT   --   405*   --   321   BUN  24*   --   21*  16   CREA  1.57*   --   1.42*  1.34*   SGOT   --    --    --   23   AP   --    --    --   88   TBILI   --    --    --   0.3       Cultures:     Lab Results   Component Value Date/Time    Specimen Description: URINE 2009 07:45 PM     Lab Results   Component Value Date/Time    Culture result: NO GROWTH ON SOLID MEDIA AT 4 DAYS 2017 12:17 PM    Culture result:  2017 12:17 PM     **METHICILLIN RESISTANT STAPHYLOCOCCUS AUREUS**  ISOLATED FROM THIO BROTH ONLY      Culture result: NO GROWTH 5 DAYS 2017 02:35 PM       Radiology:     Line/Insert Date:           Assessment:     1. Ischemic gut with frozen abdomen  2. Leukocytosis--slight improvement  3. Cultures negative to date    Objective:     1. Continue IV therapy  2.  Follow up cultures and studies    Corwin Vasquez MD

## 2017-07-04 NOTE — PROGRESS NOTES
Attempted to visit pt to offer ongoing  support. Pt's case discussed with Palliative team prior to visit. When I arrived to pt's room,  Nursing staff was assisting pt. I introduced myself to pt and told her I would visit at another time. Pt responded that she is doing [place]. \"  Attempted to return a few minutes later and a visitor had just arrived to pt's room; did not interrupt visit. Will attempt to follow-up as able. Per previous  chart notes, pt's  has visited during this hospitalization.     Bhumika Garduno, Palliative

## 2017-07-04 NOTE — PROGRESS NOTES
07/03/17 2025   Vitals   Temp 98.3 °F (36.8 °C)   Temp Source Oral   Pulse (Heart Rate) (!) 115   Heart Rate Source Monitor   Resp Rate 16   O2 Sat (%) 97 %   Level of Consciousness Alert   /81   MAP (Calculated) 106   BP 1 Location Right arm   BP 1 Method Automatic   BP Patient Position At rest   MEWS Score 3   Oxygen Therapy   Pulse via Oximetry 100 beats per minute   O2 Device Room air   Pt HR ST - baseline for patient

## 2017-07-04 NOTE — PROGRESS NOTES
Palliative Medicine Consult  [facility]: [phone] ([phone])    Patient Name: Haja Kohli  YOB: 1975    Date of Initial Consult: 6/27/17  Reason for Consult: overwhelming symptoms  Requesting Provider: Cami Lazo NP  Primary Care Physician: Jackie Asencio MD      SUMMARY:   Haja Kohli is a 46 y.o. with a past history of Crohn's disease,anxiety,  chronic pain, hx DVT, multiple (4) small bowel resections, s/p recent urgent dx lap, with lysis of adhesions, repair of suspected perf 6/7/17 , who was admitted on 6/25/2017 from home with a diagnosis of worsening abdominal pain, elevated WBC and MRSA wound infection. Initially consulted for pain management thought to be possible Crohn's flare. Initial CT on 6/25 w/out acute findings but repeat on 6/28 showing ischemic bowl. S/p ex lap but ischemic bowl unresectable, s/p SMA stent. Extubated on 6/30. Patient is followed chronically for pain management by Dr. Concha Moritz at [address] [place] [address]. Home regimen for chronic abdominal pain is MS Contin 60 Q8 and MS IR 30 Q6 PRN. She also takes Xanax 1mg TID prn anxiety.  reviewed, opioids last prescribed on 5/19/17. PALLIATIVE DIAGNOSES:     1. Abd pain, acute on chronic   2. Anxiety, acute on chronic   3. Nausea  4. Crohn's disease  5. Chronic constipation on home relistor   6. MRSA wound        PLAN:   1. Pt currently npo, pain is better controlled than yesterday but she is very tearful. She says she is not crying because of pain but by the way everyone is treating her. She wants to rest, no strength to get out of bed to urinate every hour, had a few accidents in bed and is embarrassed. Provided supportive listening and validated her emotions. Asked her to vocalize her concerns and ask RN for some time if she does not want to move right away. Mom at bedside and is supportive. 2. PCA reviewed, Cont Morphine PCA 4mg/h basal, increase to 5mg every 10 min demand.  Per RN staff, PCA cannot go higher than this. Received 10 boluses with 10 demands between 8-11 am.  3. No increase in basal, as was oversedated before. 4. .   5. Received prn Dilaudid 1 mg x 4 yesterday and one dose so far today, Ativan 2 mg last night and one dose this morning. 6. Monitor for sedation, does have narcan order. 7. Support emotionally as well. 8. When can tolerate po will help adjust back to po medications incl home MSContin. May also benefit from low dose gabapentin for some neuropathic incisional pain. 9. Bowel regimen per surgery team.   10. Communicated plan of care with: Palliative IDT; left message for Dr Ousmane Back who manages pt's chronic pain per pt request      GOALS OF CARE / TREATMENT PREFERENCES:   [====Goals of Care====]  GOALS OF CARE:  Patient / health care proxy stated goals: pain control  Recovery from acute issues      TREATMENT PREFERENCES:   Code Status: Full Code    Advance Care Planning:  Advance Care Planning 6/26/2017   Patient's Healthcare Decision Maker is: Legal Next Cirilo Jiménez   Primary Decision Maker Name Juarez Mari   Primary Decision Maker Phone Number [phone]   Primary Decision Maker Relationship to Patient Parent   Confirm Advance Directive None   Patient Would Like to Complete Advance Directive No       Other:    The palliative care team has discussed with patient / health care proxy about goals of care / treatment preferences for patient.  [====Goals of Care====]         HISTORY:         HPI/SUBJECTIVE:    The patient is:   [] Verbal and participatory  [x] Non-participatory due to: Medical condition     Pt doing better since last week, but pain significant generalized in abdomen and has some n/t in incision site. No nausea, but still w/ NGT.         Clinical Pain Assessment (nonverbal scale for severity on nonverbal patients):   [++++ Clinical Pain Assessment++++]  [++++Pain Severity++++]: Pain: 8  [++++Pain Character++++]: sharp and burning, stabbing  [++++Pain Duration++++]: several days  [++++Pain Effect++++]: hard to walk, feeling anxious  [++++Pain Factors++++]: better after pain medicaion  [++++Pain Frequency++++]: constant  [++++Pain Location++++]: across abdomen both sides in middle at incision site  [++++ Clinical Pain Assessment++++]     FUNCTIONAL ASSESSMENT:     Palliative Performance Scale (PPS):  PPS: 50       PSYCHOSOCIAL/SPIRITUAL SCREENING:     Advance Care Planning:  Advance Care Planning 6/26/2017   Patient's Healthcare Decision Maker is: Legal Next of Camilo 71   Primary Decision Maker Name Romi Loo   Primary Decision Maker Phone Number [phone]   Primary Decision Maker Relationship to Patient Parent   Confirm Advance Directive None   Patient Would Like to Complete Advance Directive No        Any spiritual / Taoist concerns:  [] Yes /  [x] No    Caregiver Burnout:  [] Yes /  [x] No /  [] No Caregiver Present      Anticipatory grief assessment:   [x] Normal  / [] Maladaptive       ESAS Anxiety: Anxiety: 0    ESAS Depression: Depression: 0        REVIEW OF SYSTEMS:     Positive and pertinent negative findings in ROS are noted above in HPI. The following systems were [x] reviewed / [] unable to be reviewed as noted in HPI  Other findings are noted below. Systems: constitutional, ears/nose/mouth/throat, respiratory, gastrointestinal, genitourinary, musculoskeletal, integumentary, neurologic, psychiatric, endocrine. Positive findings noted below. Modified ESAS Completed by: provider   Fatigue: 6 Drowsiness: 0   Depression: 0 Pain: 8   Anxiety: 0 Nausea: 0   Anorexia: 0 Dyspnea: 0     Constipation: No              PHYSICAL EXAM:     From RN flowsheet:  Wt Readings from Last 3 Encounters:   07/04/17 317 lb 14.5 oz (144.2 kg)   06/25/17 343 lb (155.6 kg)   06/22/17 343 lb (155.6 kg)     Blood pressure 145/90, pulse (!) 110, temperature 98.4 °F (36.9 °C), resp.  rate 16, height 5' 4\" (1.626 m), weight 317 lb 14.5 oz (144.2 kg), last menstrual period 05/21/2017, SpO2 98 %, not currently breastfeeding. Pain Scale 1: Numeric (0 - 10)  Pain Intensity 1: 4  Pain Onset 1: chronic  Pain Location 1: Abdomen  Pain Orientation 1: Medial  Pain Description 1: Sharp, Shooting  Pain Intervention(s) 1: Medication (see MAR)  Last bowel movement, if known: stool in ostomy     Constitutional:awake, alert, oriented, tearful and axious   Eyes: pupils equal, anicteric  ENMT: no nasal discharge, moist mucous membranes  Respiratory: breathing not labored  Gastrointestinal: midline incision w/ dressing in place,  +ostomy w/out stool, hypoactive bowel sounds   Musculoskeletal: no deformity, no tenderness to palpation  Skin: warm, dry  No edema  Neurologic: moving all extremities  Psych: anxious      HISTORY:     Active Problems:    Abdominal pain (6/25/2017)      Small bowel ischemia (HCC) (6/28/2017)      Overview: CT abd/pelvis 6/28/17      1. The stomach and proximal small bowel loops are distended. There is a       loop of      small bowel in the midabdomen which is mildly dilated and does not       demonstrate      enhancement in the wall and there is suspicion of pneumatosis and this       leads to      a loop of small bowel which demonstrates thickening of the wall and       findings are      suspicious for ischemia. 2. There is extensive mesenteric edema and a small amount of ascites in       the      pelvis. 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 There is nonocclusive thrombus in the SMA.             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 Nurys Coronado      Family History   Problem Relation Age of Onset    Hypertension Father     Stroke Father     Other Father      arthritis      History reviewed, no pertinent family history. Social History   Substance Use Topics    Smoking status: Former Smoker    Smokeless tobacco: Not on file    Alcohol use No     Allergies   Allergen Reactions    Demerol [Meperidine] Unknown (comments)    Soma [Carisoprodol] Rash and Nausea Only    Sulfa (Sulfonamide Antibiotics) Unknown (comments)    Toradol [Ketorolac Tromethamine] Unknown (comments)      Current Facility-Administered Medications   Medication Dose Route Frequency    [START ON 7/5/2017] Vancomycin trough - due 7/5 prior to 0300 dose. Thanks!    Other ONCE    TPN ADULT - CENTRAL   IntraVENous CONTINUOUS    TPN ADULT - CENTRAL   IntraVENous CONTINUOUS    LORazepam (ATIVAN) tablet 2 mg  2 mg Oral Q8H    vancomycin (VANCOCIN) 1750 mg in  ml infusion  1,750 mg IntraVENous Q24H    octreotide (SANDOSTATIN) injection 50 mcg  50 mcg IntraVENous TID    HYDROmorphone (PF) (DILAUDID) injection 1 mg  1 mg IntraVENous Q3H PRN    insulin regular (NOVOLIN R, HUMULIN R) injection   SubCUTAneous Q6H    morphine (PF)  mg/30 ml   IntraVENous CONTINUOUS    glucose chewable tablet 16 g  4 Tab Oral PRN    glucagon (GLUCAGEN) injection 1 mg  1 mg IntraMUSCular PRN    dextrose 10 % infusion 125-250 mL  125-250 mL IntraVENous PRN    pantoprazole (PROTONIX) 40 mg in sodium chloride 0.9 % 10 mL injection  40 mg IntraVENous DAILY    albuterol-ipratropium (DUO-NEB) 2.5 MG-0.5 MG/3 ML  3 mL Nebulization Q6H PRN    clopidogrel (PLAVIX) tablet 75 mg  75 mg Per NG tube DAILY    prochlorperazine (COMPAZINE) with saline injection 5 mg  5 mg IntraVENous Q6H PRN    anidulafungin (ERAXIS) 100 mg in 0.9% sodium chloride 130 mL IVPB  100 mg IntraVENous Q24H    ondansetron (ZOFRAN) injection 8 mg  8 mg IntraVENous Q6H PRN    sodium chloride (NS) flush 5-10 mL  5-10 mL IntraVENous Q8H    sodium chloride (NS) flush 5-10 mL  5-10 mL IntraVENous PRN    sodium chloride (NS) flush 5-10 mL  5-10 mL IntraVENous Q8H    sodium chloride (NS) flush 5-10 mL  5-10 mL IntraVENous PRN    naloxone (NARCAN) injection 0.4 mg  0.4 mg IntraVENous PRN    diphenhydrAMINE (BENADRYL) injection 12.5 mg  12.5 mg IntraVENous Q4H PRN    enoxaparin (LOVENOX) injection 40 mg  40 mg SubCUTAneous Q24H    piperacillin-tazobactam (ZOSYN) 3.375 g in 0.9% sodium chloride (MBP/ADV) 100 mL  3.375 g IntraVENous Q8H    Vancomycin ---Pharmacy to Dose   Other Rx Dosing/Monitoring          LAB AND IMAGING FINDINGS:     Lab Results   Component Value Date/Time    WBC 17.4 07/03/2017 11:22 AM    HGB 8.4 07/03/2017 11:22 AM    PLATELET 644 12/79/3285 11:22 AM     Lab Results   Component Value Date/Time    Sodium 145 07/04/2017 04:30 AM    Potassium 3.9 07/04/2017 04:30 AM    Chloride 111 07/04/2017 04:30 AM    CO2 24 07/04/2017 04:30 AM    BUN 24 07/04/2017 04:30 AM    Creatinine 1.57 07/04/2017 04:30 AM    Calcium 9.3 07/04/2017 04:30 AM    Magnesium 1.8 07/04/2017 04:30 AM    Phosphorus 4.5 07/04/2017 04:30 AM      Lab Results   Component Value Date/Time    AST (SGOT) 23 07/02/2017 12:33 AM    Alk. phosphatase 88 07/02/2017 12:33 AM    Protein, total 6.9 07/02/2017 12:33 AM    Albumin 1.7 07/02/2017 12:33 AM    Globulin 5.2 07/02/2017 12:33 AM     No results found for: INR, PTMR, PTP, PT1, PT2, APTT   No results found for: IRON, FE, TIBC, IBCT, PSAT, FERR   No results found for: PH, PCO2, PO2  No components found for: GLPOC   No results found for: CPK, CKMB             Total time:   Counseling / coordination time, spent as noted above:    > 50% counseling / coordination?:     Prolonged service was provided for  []30 min   []75 min in face to face time in the presence of the patient, spent as noted above.   Time Start:   Time End:   Note: this can only be billed with 86426 (initial) or 16861 (follow up). If multiple start / stop times, list each separately.

## 2017-07-04 NOTE — PROGRESS NOTES
Bedside and Verbal shift change report given to Shane Collier (oncoming nurse) by Kirsty/OB's  (offgoing nurse). Report included the following information SBAR, OR Summary, Procedure Summary, Intake/Output, MAR, Accordion, Recent Results, Med Rec Status and Cardiac Rhythm Sinus Tach/ Normal Sinus.

## 2017-07-04 NOTE — PROGRESS NOTES
Problem: Falls - Risk of  Goal: *Absence of falls  Patient up with one person assist; demonstrates a steady gait

## 2017-07-04 NOTE — PROGRESS NOTES
Primary Nurse Love Sorto and Joss [facility] performed a dual skin assessment on this patient No impairment noted  Amador score is 19

## 2017-07-04 NOTE — PROGRESS NOTES
Problem: Pressure Injury - Risk of  Goal: *Prevention of pressure ulcer  Outcome: Progressing Towards Goal  No signs of skin breakdown from pressure

## 2017-07-04 NOTE — PROGRESS NOTES
Bedside shift change report given to Eunice Weldon RN by Darrin Graham RN.  Report included the following information SBAR, Kardex, OR Summary, Intake/Output, MAR and Cardiac Rhythm ST.

## 2017-07-05 ENCOUNTER — APPOINTMENT (OUTPATIENT)
Dept: GENERAL RADIOLOGY | Age: 42
DRG: 335 | End: 2017-07-05
Attending: SURGERY
Payer: MEDICARE

## 2017-07-05 ENCOUNTER — APPOINTMENT (OUTPATIENT)
Dept: CT IMAGING | Age: 42
DRG: 335 | End: 2017-07-05
Attending: SURGERY
Payer: MEDICARE

## 2017-07-05 LAB
ALBUMIN SERPL BCP-MCNC: 1.6 G/DL (ref 3.5–5)
ALBUMIN/GLOB SERPL: 0.3 {RATIO} (ref 1.1–2.2)
ALP SERPL-CCNC: 99 U/L (ref 45–117)
ALT SERPL-CCNC: 14 U/L (ref 12–78)
ANION GAP BLD CALC-SCNC: 8 MMOL/L (ref 5–15)
AST SERPL W P-5'-P-CCNC: 12 U/L (ref 15–37)
BILIRUB SERPL-MCNC: 0.7 MG/DL (ref 0.2–1)
BUN SERPL-MCNC: 27 MG/DL (ref 6–20)
BUN/CREAT SERPL: 17 (ref 12–20)
CALCIUM SERPL-MCNC: 9.2 MG/DL (ref 8.5–10.1)
CHLORIDE SERPL-SCNC: 107 MMOL/L (ref 97–108)
CO2 SERPL-SCNC: 25 MMOL/L (ref 21–32)
CREAT SERPL-MCNC: 1.55 MG/DL (ref 0.55–1.02)
DATE LAST DOSE: ABNORMAL
ERYTHROCYTE [DISTWIDTH] IN BLOOD BY AUTOMATED COUNT: 15.7 % (ref 11.5–14.5)
GLOBULIN SER CALC-MCNC: 5.2 G/DL (ref 2–4)
GLUCOSE BLD STRIP.AUTO-MCNC: 111 MG/DL (ref 65–100)
GLUCOSE BLD STRIP.AUTO-MCNC: 150 MG/DL (ref 65–100)
GLUCOSE BLD STRIP.AUTO-MCNC: 151 MG/DL (ref 65–100)
GLUCOSE BLD STRIP.AUTO-MCNC: 153 MG/DL (ref 65–100)
GLUCOSE BLD STRIP.AUTO-MCNC: 163 MG/DL (ref 65–100)
GLUCOSE SERPL-MCNC: 142 MG/DL (ref 65–100)
HCT VFR BLD AUTO: 24.5 % (ref 35–47)
HGB BLD-MCNC: 7.6 G/DL (ref 11.5–16)
MAGNESIUM SERPL-MCNC: 1.7 MG/DL (ref 1.6–2.4)
MCH RBC QN AUTO: 27.7 PG (ref 26–34)
MCHC RBC AUTO-ENTMCNC: 31 G/DL (ref 30–36.5)
MCV RBC AUTO: 89.4 FL (ref 80–99)
PHOSPHATE SERPL-MCNC: 4.9 MG/DL (ref 2.6–4.7)
PLATELET # BLD AUTO: 392 K/UL (ref 150–400)
POTASSIUM SERPL-SCNC: 3.6 MMOL/L (ref 3.5–5.1)
PROT SERPL-MCNC: 6.8 G/DL (ref 6.4–8.2)
RBC # BLD AUTO: 2.74 M/UL (ref 3.8–5.2)
REPORTED DOSE,DOSE: ABNORMAL UNITS
REPORTED DOSE/TIME,TMG: 323
SERVICE CMNT-IMP: ABNORMAL
SODIUM SERPL-SCNC: 140 MMOL/L (ref 136–145)
VANCOMYCIN TROUGH SERPL-MCNC: 13.8 UG/ML (ref 5–10)
WBC # BLD AUTO: 19.3 K/UL (ref 3.6–11)

## 2017-07-05 PROCEDURE — 74011250637 HC RX REV CODE- 250/637: Performed by: SURGERY

## 2017-07-05 PROCEDURE — 36415 COLL VENOUS BLD VENIPUNCTURE: CPT | Performed by: SURGERY

## 2017-07-05 PROCEDURE — 74011250636 HC RX REV CODE- 250/636: Performed by: NURSE PRACTITIONER

## 2017-07-05 PROCEDURE — 74245 XR UPPER GI/SMALL BOWEL: CPT

## 2017-07-05 PROCEDURE — 74011250636 HC RX REV CODE- 250/636: Performed by: SURGERY

## 2017-07-05 PROCEDURE — C9113 INJ PANTOPRAZOLE SODIUM, VIA: HCPCS | Performed by: SURGERY

## 2017-07-05 PROCEDURE — 65660000000 HC RM CCU STEPDOWN

## 2017-07-05 PROCEDURE — 74011000258 HC RX REV CODE- 258: Performed by: NURSE PRACTITIONER

## 2017-07-05 PROCEDURE — 85027 COMPLETE CBC AUTOMATED: CPT | Performed by: SURGERY

## 2017-07-05 PROCEDURE — 74176 CT ABD & PELVIS W/O CONTRAST: CPT

## 2017-07-05 PROCEDURE — 74011250636 HC RX REV CODE- 250/636: Performed by: INTERNAL MEDICINE

## 2017-07-05 PROCEDURE — 71010 XR CHEST PORT: CPT

## 2017-07-05 PROCEDURE — 74011000250 HC RX REV CODE- 250: Performed by: SURGERY

## 2017-07-05 PROCEDURE — 74011000250 HC RX REV CODE- 250: Performed by: NURSE PRACTITIONER

## 2017-07-05 PROCEDURE — 74011000258 HC RX REV CODE- 258: Performed by: SURGERY

## 2017-07-05 PROCEDURE — 3331090002 HH PPS REVENUE DEBIT

## 2017-07-05 PROCEDURE — 80202 ASSAY OF VANCOMYCIN: CPT | Performed by: SURGERY

## 2017-07-05 PROCEDURE — 3331090001 HH PPS REVENUE CREDIT

## 2017-07-05 PROCEDURE — 83735 ASSAY OF MAGNESIUM: CPT | Performed by: SURGERY

## 2017-07-05 PROCEDURE — 80053 COMPREHEN METABOLIC PANEL: CPT | Performed by: SURGERY

## 2017-07-05 PROCEDURE — 74011636637 HC RX REV CODE- 636/637: Performed by: SURGERY

## 2017-07-05 PROCEDURE — 74011250636 HC RX REV CODE- 250/636: Performed by: PHYSICAL MEDICINE & REHABILITATION

## 2017-07-05 PROCEDURE — 74011636637 HC RX REV CODE- 636/637: Performed by: NURSE PRACTITIONER

## 2017-07-05 PROCEDURE — 74011000258 HC RX REV CODE- 258: Performed by: INTERNAL MEDICINE

## 2017-07-05 PROCEDURE — 77030018717 HC DRSG GRNUFM KCON -B

## 2017-07-05 PROCEDURE — 84100 ASSAY OF PHOSPHORUS: CPT | Performed by: SURGERY

## 2017-07-05 RX ORDER — HYDROMORPHONE HYDROCHLORIDE 1 MG/ML
1 INJECTION, SOLUTION INTRAMUSCULAR; INTRAVENOUS; SUBCUTANEOUS
Status: DISCONTINUED | OUTPATIENT
Start: 2017-07-05 | End: 2017-08-24 | Stop reason: HOSPADM

## 2017-07-05 RX ORDER — GUAIFENESIN 100 MG/5ML
81 LIQUID (ML) ORAL DAILY
Status: DISCONTINUED | OUTPATIENT
Start: 2017-07-05 | End: 2017-08-24 | Stop reason: HOSPADM

## 2017-07-05 RX ORDER — LORAZEPAM 2 MG/ML
1 INJECTION INTRAMUSCULAR EVERY 8 HOURS
Status: DISCONTINUED | OUTPATIENT
Start: 2017-07-05 | End: 2017-07-20

## 2017-07-05 RX ADMIN — Medication 10 ML: at 13:36

## 2017-07-05 RX ADMIN — Medication 1 MG: at 14:06

## 2017-07-05 RX ADMIN — PIPERACILLIN SODIUM,TAZOBACTAM SODIUM 3.38 G: 3; .375 INJECTION, POWDER, FOR SOLUTION INTRAVENOUS at 13:47

## 2017-07-05 RX ADMIN — ASPIRIN 81 MG CHEWABLE TABLET 81 MG: 81 TABLET CHEWABLE at 21:01

## 2017-07-05 RX ADMIN — HYDROMORPHONE HYDROCHLORIDE 1 MG: 1 INJECTION, SOLUTION INTRAMUSCULAR; INTRAVENOUS; SUBCUTANEOUS at 16:23

## 2017-07-05 RX ADMIN — OCTREOTIDE ACETATE 50 MCG: 50 INJECTION, SOLUTION INTRAVENOUS; SUBCUTANEOUS at 16:08

## 2017-07-05 RX ADMIN — ONDANSETRON 8 MG: 2 INJECTION INTRAMUSCULAR; INTRAVENOUS at 03:33

## 2017-07-05 RX ADMIN — HUMAN INSULIN 2 UNITS: 100 INJECTION, SOLUTION SUBCUTANEOUS at 00:05

## 2017-07-05 RX ADMIN — Medication: at 21:19

## 2017-07-05 RX ADMIN — VANCOMYCIN HYDROCHLORIDE 1750 MG: 10 INJECTION, POWDER, LYOPHILIZED, FOR SOLUTION INTRAVENOUS at 03:33

## 2017-07-05 RX ADMIN — CALCIUM GLUCONATE: 94 INJECTION, SOLUTION INTRAVENOUS at 18:48

## 2017-07-05 RX ADMIN — Medication: at 14:15

## 2017-07-05 RX ADMIN — ENOXAPARIN SODIUM 40 MG: 40 INJECTION SUBCUTANEOUS at 00:05

## 2017-07-05 RX ADMIN — HYDROMORPHONE HYDROCHLORIDE 1 MG: 1 INJECTION, SOLUTION INTRAMUSCULAR; INTRAVENOUS; SUBCUTANEOUS at 03:33

## 2017-07-05 RX ADMIN — Medication: at 05:31

## 2017-07-05 RX ADMIN — LORAZEPAM 2 MG: 1 TABLET ORAL at 05:31

## 2017-07-05 RX ADMIN — SODIUM CHLORIDE 40 MG: 9 INJECTION INTRAMUSCULAR; INTRAVENOUS; SUBCUTANEOUS at 11:48

## 2017-07-05 RX ADMIN — HUMAN INSULIN 2 UNITS: 100 INJECTION, SOLUTION SUBCUTANEOUS at 18:00

## 2017-07-05 RX ADMIN — Medication 1 MG: at 21:02

## 2017-07-05 RX ADMIN — Medication: at 20:02

## 2017-07-05 RX ADMIN — HUMAN INSULIN 2 UNITS: 100 INJECTION, SOLUTION SUBCUTANEOUS at 13:47

## 2017-07-05 RX ADMIN — PIPERACILLIN SODIUM,TAZOBACTAM SODIUM 3.38 G: 3; .375 INJECTION, POWDER, FOR SOLUTION INTRAVENOUS at 05:31

## 2017-07-05 RX ADMIN — HYDROMORPHONE HYDROCHLORIDE 1 MG: 1 INJECTION, SOLUTION INTRAMUSCULAR; INTRAVENOUS; SUBCUTANEOUS at 21:21

## 2017-07-05 RX ADMIN — SODIUM CHLORIDE 100 MG: 900 INJECTION, SOLUTION INTRAVENOUS at 18:47

## 2017-07-05 RX ADMIN — PIPERACILLIN SODIUM,TAZOBACTAM SODIUM 3.38 G: 3; .375 INJECTION, POWDER, FOR SOLUTION INTRAVENOUS at 21:02

## 2017-07-05 RX ADMIN — OCTREOTIDE ACETATE 50 MCG: 50 INJECTION, SOLUTION INTRAVENOUS; SUBCUTANEOUS at 21:01

## 2017-07-05 NOTE — PROGRESS NOTES
Music Therapy Assessment    Haja Kohli [id number]  [id number]    1975  46 y.o.  female    Patient Telephone Number: [phone] (home)   Mandaeism Affiliation: Non Restoration   Language: English   Extended Emergency Contact Information  Primary Emergency Contact: Miesha Welsh  Address: [address] [place] [address] [address] [place] [address], [address] [place] [address] Phone: [phone]  Relation: Parent   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    Abdominal pain 06/25/2017    Perforated bowel ([address] 06/06/2017    Right flank pain 08/22/2011    Crohn's disease ([address] 08/15/2011    DVT (deep venous thrombosis) [facility] 08/15/2011    Incarcerated ventral hernia 08/15/2011        Date: 7/5/2017       Mental Status:   [x] Alert [  ] Kavita Ramming [  ]  Confused  [  ] Minimally responsive    Communication Status: [  ] Impaired Speech [  ] Nonverbal     Physical Status:   [  ] Oxygen in use  [  ] Hard of Hearing [  ] Vision Impaired  [  ] Ambulatory  [  ] Ambulatory with assistance [  ] Non-ambulatory -N/A    Music Preferences, Background: Classical, [address], Pop, Jazz, especially [address] [facility] and [place]; The patient sang in the school choir, and took guitar and cello lessons. Clinical Problem addressed: Decrease feelings of stress, support healthy coping, increase relaxation. Goal(s) met in session:  Physical/Pain management (Scale of 1-10):    Pre-session rating: Pt reported pain in her stomach. When asked how she'd rate it, she said she's been given pain medicine that she thinks will help. Post-session rating: The pt said the pain had lessened but not ceased.    [x] Increased relaxation   [  ] Regulated breathing patterns  [  ] Decreased muscle tension   [  ] Minimized physical distress     Emotional/Psychological:  [x] Increased self-expression   [  ] Decreased aggressive behavior   [  ] Decreased sadness   [  ] Discussed healthy coping skills     [x] Improved mood    [  ] Decreased withdrawn behavior     Social:  [  ] Decreased feelings of isolation/loneliness [x] Positive social interaction   [x] Provided support and/or comfort for family/friends    Spiritual:  [  ] Spiritual support    [  ] Expressed peace  [  ] Expressed jalen    [  ] Discussed beliefs    Techniques Utilized (Check all that apply):   [  ] Procedural support MT [x] Music for relaxation [x] Patient preferred music  [  ] Suzie analysis  [x] Song choice  [  ] Music for validation  [  ] Entrainment  [  ] Progressive muscle relax. [  ] Guided visualization  [  ] Rebeka Rosenberg  [  ] Patient instrument playing [  ] William Parkinson writing  [x] Sing along   [  ] Autumn Schmitz  [  ] Sensory stimulation  [x] Active Listening  [  ] Music for spiritual support [  ] Making of CDs as gifts    Session Observations:  Referral from [facility], [address] [place] [address]. The patient (pt) was observed to be lying in bed with pain in her stomach. Her mother Sara Willoughby was sitting across the room. The pt was tearful as she shared with this music therapist (MT) that she thought her previous surgery would be her last, but now she's learned she needs another. The MT provided active listening and validation as the pt vented her frustrations. The pt said her goal was to not feel so fearful and to relax. She shared her music preferences and music background. The MT sang the [address] [employer] a capella (vocals only without instrumental accompaniment). The pt and her mother increased emotional expression in response to the music, as evidenced by (AEB) being tearful. They expressed enjoyment in the music. The pt chose to hear two Denis Mola songs next. The MT softly sang [employer] and Someone Like You a capella and at a moderately slow tempo.  The pt increased self-expression in response to the second song, AEB singing along and weeping. She said that song made her think of a friend who's passed away. The MT expressed sympathy for this and encouraged the pt that it's healthy to express emotions rather than keeping them in. The MT concluded the session softly singing the [place] a capella. The pt increased relaxation in response tot he music, AEB closing her eyes. Her mother was tearful in response to the music. The pt and her mother expressed gratitude for the session. Will follow as able.     Rylan Jimenes MT-BC (Music Therapist-Board Certified)  Spiritual Care Department  Referral-based service

## 2017-07-05 NOTE — PROGRESS NOTES
General Surgery Daily Progress Note    Admit Date: 2017  Post-Operative Day: 7 Days Post-Op from Procedure(s):  LAPAROTOMY EXPLORATORY BOWEL RESECTION, SMA STENT LEFT     Subjective:     Last 24 hrs: Pt is crying, having pain; whole incision is oozing gastric contents. Objective:     Blood pressure 140/75, pulse 99, temperature 98.5 °F (36.9 °C), resp. rate 18, height 5' 4\" (1.626 m), weight 323 lb 13.7 oz (146.9 kg), last menstrual period 2017, SpO2 100 %, not currently breastfeeding. Temp (24hrs), Av.6 °F (37 °C), Min:97.8 °F (36.6 °C), Max:99.2 °F (37.3 °C)      _____________________  Physical Exam:     Alert and upset  Cardiovascular: RRR, no peripheral edema  Abdomen: wound bag to sxn drainage green contents; distal incision draining lg amts of the same contents; ostomy w/ sm amt stool      Assessment:   Active Problems:    Abdominal pain (2017)      Small bowel ischemia ([address] (2017)      Overview: CT abd/pelvis 17      1. The stomach and proximal small bowel loops are distended. There is a       loop of      small bowel in the midabdomen which is mildly dilated and does not       demonstrate      enhancement in the wall and there is suspicion of pneumatosis and this       leads to      a loop of small bowel which demonstrates thickening of the wall and       findings are      suspicious for ischemia. 2. There is extensive mesenteric edema and a small amount of ascites in       the      pelvis. 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 There is nonocclusive thrombus in the SMA.             Enterocutaneous fistula (2017)            Plan:     Renew TPN  Wound care to put an incision vac on to keep contents controlled  To surgery Friday for repair of fistula  Gi/dvt proph  Dr Valentin Box will check w/ vascular about stopping plavix  abx  Am labs    Data Review:    Recent Labs      17   0308  17   1122 WBC  19.3*  17.4*   HGB  7.6*  8.4*   HCT  24.5*  27.1*   PLT  392  405*     Recent Labs      07/05/17   0308  07/04/17   0430  07/03/17   0400   NA  140  145  147*   K  3.6  3.9  3.6   CL  107  111*  115*   CO2  25  24  27   GLU  142*  68  133*   BUN  27*  24*  21*   CREA  1.55*  1.57*  1.42*   CA  9.2  9.3  8.9   MG  1.7  1.8  1.8   PHOS  4.9*  4.5  4.5   ALB  1.6*   --    --    SGOT  12*   --    --    ALT  14   --    --      No results for input(s): AML, LPSE in the last 72 hours.         ______________________  Medications:    Current Facility-Administered Medications   Medication Dose Route Frequency    TPN ADULT - CENTRAL   IntraVENous CONTINUOUS    TPN ADULT - CENTRAL   IntraVENous CONTINUOUS    LORazepam (ATIVAN) tablet 2 mg  2 mg Oral Q8H    vancomycin (VANCOCIN) 1750 mg in  ml infusion  1,750 mg IntraVENous Q24H    octreotide (SANDOSTATIN) injection 50 mcg  50 mcg IntraVENous TID    HYDROmorphone (PF) (DILAUDID) injection 1 mg  1 mg IntraVENous Q3H PRN    insulin regular (NOVOLIN R, HUMULIN R) injection   SubCUTAneous Q6H    morphine (PF)  mg/30 ml   IntraVENous CONTINUOUS    glucose chewable tablet 16 g  4 Tab Oral PRN    glucagon (GLUCAGEN) injection 1 mg  1 mg IntraMUSCular PRN    dextrose 10 % infusion 125-250 mL  125-250 mL IntraVENous PRN    pantoprazole (PROTONIX) 40 mg in sodium chloride 0.9 % 10 mL injection  40 mg IntraVENous DAILY    albuterol-ipratropium (DUO-NEB) 2.5 MG-0.5 MG/3 ML  3 mL Nebulization Q6H PRN    clopidogrel (PLAVIX) tablet 75 mg  75 mg Per NG tube DAILY    prochlorperazine (COMPAZINE) with saline injection 5 mg  5 mg IntraVENous Q6H PRN    anidulafungin (ERAXIS) 100 mg in 0.9% sodium chloride 130 mL IVPB  100 mg IntraVENous Q24H    ondansetron (ZOFRAN) injection 8 mg  8 mg IntraVENous Q6H PRN    sodium chloride (NS) flush 5-10 mL  5-10 mL IntraVENous Q8H    sodium chloride (NS) flush 5-10 mL  5-10 mL IntraVENous PRN    sodium chloride (NS) flush 5-10 mL  5-10 mL IntraVENous Q8H    sodium chloride (NS) flush 5-10 mL  5-10 mL IntraVENous PRN    naloxone (NARCAN) injection 0.4 mg  0.4 mg IntraVENous PRN    diphenhydrAMINE (BENADRYL) injection 12.5 mg  12.5 mg IntraVENous Q4H PRN    enoxaparin (LOVENOX) injection 40 mg  40 mg SubCUTAneous Q24H    piperacillin-tazobactam (ZOSYN) 3.375 g in 0.9% sodium chloride (MBP/ADV) 100 mL  3.375 g IntraVENous Q8H    Vancomycin ---Pharmacy to Dose   Other Rx Dosing/Monitoring       Latoya Brooks NP  7/5/2017            ADDENDUM:  Nico Soria MD  Pt seen and examined. Upper GI showed proximal EC fistula. Pt with almost 2 liters of output from fistula despite being kept NPO and placed on octreotide. Given history of Crohn's disease with proximal high output fistula, it is unlikely fistula will close on its own. Will plan OR for fistula takedown on Friday. Stop Plavix and start low dose aspirin. Continue antibiotic and local wound care.

## 2017-07-05 NOTE — PROGRESS NOTES
Palliative Medicine Consult  Joe: [phone]    Patient Name: Susie Manning  YOB: 1975    Date of Initial Consult: 6/27/17  Reason for Consult: overwhelming symptoms  Requesting Provider: Katelyn Caban NP  Primary Care Physician: Henry Izaguirre MD      SUMMARY:   Susie Manning is a 46 y.o. with a past history of Crohn's disease,anxiety,  chronic pain, hx DVT, multiple (4) small bowel resections, s/p recent urgent dx lap, with lysis of adhesions, repair of suspected perf 6/7/17 , who was admitted on 6/25/2017 from home with a diagnosis of worsening abdominal pain, elevated WBC and MRSA wound infection. Initially consulted for pain management thought to be possible Crohn's flare. Initial CT on 6/25 w/out acute findings but repeat on 6/28 showing ischemic bowl. S/p ex lap but ischemic bowl unresectable, s/p SMA stent. Extubated on 6/30. Patient is followed chronically for pain management by Dr. Mar Pacheco at [address] [place] [address]. Home regimen for chronic abdominal pain is MS Contin 60 Q8 and MS IR 30 Q6 PRN. She also takes Xanax 1mg TID prn anxiety.  reviewed, opioids last prescribed on 5/19/17. PALLIATIVE DIAGNOSES:     1. Abd pain, acute on chronic   2. Anxiety, acute on chronic   3. Nausea  4. Crohn's disease  5. Chronic constipation on home relistor   6. MRSA wound        PLAN:   1. Pt remains NPO, on TPN. Found to have new proximal fistula which needs surgical intervention. Surgery planned for Friday. 2. PCA reviewed, Morphine PCA 4mg/h basal, increased to 5mg every 10 min demand on 7/3. She received 18/18 demands from 5 am to 2pm. She reports increased with frequent dressing changes due to copious drainage from the fistula, being moved for CT scan, etc yesterday and today. When not moved her pain is better tolerated and controlled. We discussed strategies on pain control.  Agreed that increase in current pca is not indicated as her baseline pain is well controlled and she needs medicine only during dressing change, etc. She does benefit from Dilaudid 1 mg IV for that. Now instructed to administer prior to dressing charge or moving patient. Increase frequency to q1h prn.    3. Monitor for sedation, does have narcan order. 4. Emotional support provided. Reassured that we will follow closely to help with pain control after surgery. 5. When can tolerate po will help adjust back to po medications incl home MSContin. May also benefit from low dose gabapentin for some neuropathic incisional pain. 6. Bowel regimen per surgery team.   7. Communicated plan of care with: Palliative IDT; Dr. Ailyn Dias / TREATMENT PREFERENCES:   [====Goals of Care====]  GOALS OF CARE:  Patient / health care proxy stated goals: pain control  Recovery from acute issues      TREATMENT PREFERENCES:   Code Status: Full Code    Advance Care Planning:  Advance Care Planning 6/26/2017   Patient's Healthcare Decision Maker is: Legal Next of Camilo Jiménez   Primary Decision Maker Name Alex Goyal   Primary Decision Maker Phone Number [phone]   Primary Decision Maker Relationship to Patient Parent   Confirm Advance Directive None   Patient Would Like to Complete Advance Directive No       Other:    The palliative care team has discussed with patient / health care proxy about goals of care / treatment preferences for patient.  [====Goals of Care====]         HISTORY:         HPI/SUBJECTIVE:    The patient is:   [x] Verbal and participatory  [] Non-participatory due to: Medical condition     Very emotional, crying- afraid of pain, states \" the Universe is against me\". Mom at bedside, supportive,  Med rec reviewed, received 3 doses of IV dilaudid 1 mg. Slept okay for a few hours but woke up to complete soakage of dressing and pain.         Clinical Pain Assessment (nonverbal scale for severity on nonverbal patients):   [++++ Clinical Pain Assessment++++]  [++++Pain Severity++++]: Pain: 6  [++++Pain Character++++]: sharp and burning, stabbing  [++++Pain Duration++++]: several days  [++++Pain Effect++++]: hard to walk, feeling anxious  [++++Pain Factors++++]: better after pain medicaion  [++++Pain Frequency++++]: constant  [++++Pain Location++++]: across abdomen both sides in middle at incision site  [++++ Clinical Pain Assessment++++]     FUNCTIONAL ASSESSMENT:     Palliative Performance Scale (PPS):  PPS: 50       PSYCHOSOCIAL/SPIRITUAL SCREENING:     Advance Care Planning:  Advance Care Planning 6/26/2017   Patient's Healthcare Decision Maker is: Legal Next of Camilo Jiménez   Primary Decision Maker Name Radha Brennan   Primary Decision Maker Phone Number [phone]   Primary Decision Maker Relationship to Patient Parent   Confirm Advance Directive None   Patient Would Like to Complete Advance Directive No        Any spiritual / Yarsanism concerns:  [] Yes /  [x] No    Caregiver Burnout:  [] Yes /  [x] No /  [] No Caregiver Present      Anticipatory grief assessment:   [x] Normal  / [] Maladaptive       ESAS Anxiety: Anxiety: 0    ESAS Depression: Depression: 0        REVIEW OF SYSTEMS:     Positive and pertinent negative findings in ROS are noted above in HPI. The following systems were [x] reviewed / [] unable to be reviewed as noted in HPI  Other findings are noted below. Systems: constitutional, ears/nose/mouth/throat, respiratory, gastrointestinal, genitourinary, musculoskeletal, integumentary, neurologic, psychiatric, endocrine. Positive findings noted below. Modified ESAS Completed by: provider   Fatigue: 6 Drowsiness: 0   Depression: 0 Pain: 6   Anxiety: 0 Nausea: 0   Anorexia: 0 Dyspnea: 0     Constipation: No              PHYSICAL EXAM:     From RN flowsheet:  Wt Readings from Last 3 Encounters:   07/05/17 323 lb 13.7 oz (146.9 kg)   06/25/17 343 lb (155.6 kg)   06/22/17 343 lb (155.6 kg)     Blood pressure 140/75, pulse 99, temperature 98.5 °F (36.9 °C), resp.  rate 18, height 5' 4\" (1.626 m), weight 323 lb 13.7 oz (146.9 kg), last menstrual period 05/21/2017, SpO2 100 %, not currently breastfeeding. Pain Scale 1: Numeric (0 - 10)  Pain Intensity 1: 8  Pain Onset 1: chronic  Pain Location 1: Abdomen  Pain Orientation 1: Medial  Pain Description 1: Sharp  Pain Intervention(s) 1: Encouraged PCA, Medication (see MAR)  Last bowel movement, if known: stool in ostomy     Constitutional:awake, alert, oriented, tearful and axious   Eyes: pupils equal, anicteric  ENMT: no nasal discharge, moist mucous membranes  Respiratory: breathing not labored  Gastrointestinal: midline incision w/ dressing in place,  +ostomy w/out stool, hypoactive bowel sounds   Musculoskeletal: no deformity, no tenderness to palpation  Skin: warm, dry  No edema  Neurologic: moving all extremities  Psych: anxious      HISTORY:     Active Problems:    Abdominal pain (6/25/2017)      Small bowel ischemia ([facility]) (6/28/2017)      Overview: CT abd/pelvis 6/28/17      1. The stomach and proximal small bowel loops are distended. There is a       loop of      small bowel in the midabdomen which is mildly dilated and does not       demonstrate      enhancement in the wall and there is suspicion of pneumatosis and this       leads to      a loop of small bowel which demonstrates thickening of the wall and       findings are      suspicious for ischemia. 2. There is extensive mesenteric edema and a small amount of ascites in       the      pelvis. 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 There is nonocclusive thrombus in the SMA.             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)      Past Medical History:   Diagnosis Date    Crohn's disease ([address] 8/15/2011    DVT (deep venous thrombosis) ([address] 8/15/2011    Incarcerated ventral hernia 8/15/2011    Right flank pain 8/22/2011    Seizures ([address]     Stroke [facility])       Past Surgical History:   Procedure Laterality Date    HX OTHER SURGICAL      multiple procedures related to her Crohn's disease    MS LAP, INCISIONAL HERNIA REPAIR,INCARCERATED  9-10-08    dr. Dino Jade History   Problem Relation Age of Onset    Hypertension Father     Stroke Father     Other Father      arthritis      History reviewed, no pertinent family history.   Social History   Substance Use Topics    Smoking status: Former Smoker    Smokeless tobacco: Not on file    Alcohol use No     Allergies   Allergen Reactions    Demerol [Meperidine] Unknown (comments)    Soma [Carisoprodol] Rash and Nausea Only    Sulfa (Sulfonamide Antibiotics) Unknown (comments)    Toradol [Ketorolac Tromethamine] Unknown (comments)      Current Facility-Administered Medications   Medication Dose Route Frequency    TPN ADULT - CENTRAL   IntraVENous CONTINUOUS    LORazepam (ATIVAN) injection 1 mg  1 mg IntraVENous Q8H    HYDROmorphone (PF) (DILAUDID) injection 1 mg  1 mg IntraVENous Q1H PRN    TPN ADULT - CENTRAL   IntraVENous CONTINUOUS    vancomycin (VANCOCIN) 1750 mg in  ml infusion  1,750 mg IntraVENous Q24H    octreotide (SANDOSTATIN) injection 50 mcg  50 mcg IntraVENous TID    insulin regular (NOVOLIN R, HUMULIN R) injection   SubCUTAneous Q6H    morphine (PF)  mg/30 ml   IntraVENous CONTINUOUS    glucose chewable tablet 16 g  4 Tab Oral PRN    glucagon (GLUCAGEN) injection 1 mg  1 mg IntraMUSCular PRN    dextrose 10 % infusion 125-250 mL  125-250 mL IntraVENous PRN    pantoprazole (PROTONIX) 40 mg in sodium chloride 0.9 % 10 mL injection  40 mg IntraVENous DAILY    albuterol-ipratropium (DUO-NEB) 2.5 MG-0.5 MG/3 ML  3 mL Nebulization Q6H PRN    clopidogrel (PLAVIX) tablet 75 mg  75 mg Per NG tube DAILY    prochlorperazine (COMPAZINE) with saline injection 5 mg  5 mg IntraVENous Q6H PRN    anidulafungin (ERAXIS) 100 mg in 0.9% sodium chloride 130 mL IVPB  100 mg IntraVENous Q24H    ondansetron (ZOFRAN) injection 8 mg  8 mg IntraVENous Q6H PRN    sodium chloride (NS) flush 5-10 mL  5-10 mL IntraVENous Q8H    sodium chloride (NS) flush 5-10 mL  5-10 mL IntraVENous PRN    sodium chloride (NS) flush 5-10 mL  5-10 mL IntraVENous Q8H    sodium chloride (NS) flush 5-10 mL  5-10 mL IntraVENous PRN    naloxone (NARCAN) injection 0.4 mg  0.4 mg IntraVENous PRN    diphenhydrAMINE (BENADRYL) injection 12.5 mg  12.5 mg IntraVENous Q4H PRN    enoxaparin (LOVENOX) injection 40 mg  40 mg SubCUTAneous Q24H    piperacillin-tazobactam (ZOSYN) 3.375 g in 0.9% sodium chloride (MBP/ADV) 100 mL  3.375 g IntraVENous Q8H    Vancomycin ---Pharmacy to Dose   Other Rx Dosing/Monitoring          LAB AND IMAGING FINDINGS:     Lab Results   Component Value Date/Time    WBC 19.3 07/05/2017 03:08 AM    HGB 7.6 07/05/2017 03:08 AM    PLATELET 145 14/76/7281 03:08 AM     Lab Results   Component Value Date/Time    Sodium 140 07/05/2017 03:08 AM    Potassium 3.6 07/05/2017 03:08 AM    Chloride 107 07/05/2017 03:08 AM    CO2 25 07/05/2017 03:08 AM    BUN 27 07/05/2017 03:08 AM    Creatinine 1.55 07/05/2017 03:08 AM    Calcium 9.2 07/05/2017 03:08 AM    Magnesium 1.7 07/05/2017 03:08 AM    Phosphorus 4.9 07/05/2017 03:08 AM      Lab Results   Component Value Date/Time    AST (SGOT) 12 07/05/2017 03:08 AM    Alk. phosphatase 99 07/05/2017 03:08 AM    Protein, total 6.8 07/05/2017 03:08 AM    Albumin 1.6 07/05/2017 03:08 AM    Globulin 5.2 07/05/2017 03:08 AM     No results found for: INR, PTMR, PTP, PT1, PT2, APTT   No results found for: IRON, FE, TIBC, IBCT, PSAT, FERR   No results found for: PH, PCO2, PO2  No components found for: GLPOC   No results found for: CPK, CKMB             Total time:   Counseling / coordination time, spent as noted above:    > 50% counseling / coordination?:     Prolonged service was provided for  []30 min   []75 min in face to face time in the presence of the patient, spent as noted above.   Time Start:   Time End:   Note: this can only be billed with 76448 (initial) or 67759 (follow up). If multiple start / stop times, list each separately.

## 2017-07-05 NOTE — PROGRESS NOTES
Pharmacist Note - Vancomycin Dosing  Therapy day 11  Indication: surgical site infection / ischemic bowel S/P SMA stenting   Current regimen: 1750 mg IV q24h    A Trough Level resulted at 13.8 mcg/mL which was obtained 23.75 hrs post-dose. The extrapolated \"true\" trough is approximately 13.66 mcg/mL based on the patient's known kinetics. Goal trough: 10 - 15 mcg/mL     Plan: Continue current regimen. Pharmacy will continue to monitor this patient daily for changes in clinical status and renal function.

## 2017-07-05 NOTE — ROUTINE PROCESS
Patient returned to floor and patient is covered in bile from her midline leaking and wound manager leaking. Nurse stayed in room with PCT to clean up patient and repair wound manager and midline continued to leak even after redressing. RN paged wound care to assist in situation. Wound care arrived around 1140 and attempts to resolve the issue. Currently there is a vacuum dressing that is holding a seal to the midline incision and a new wounds manager that is intact.  Will continue to monitor

## 2017-07-05 NOTE — PROGRESS NOTES
Bedside and Verbal shift change report given to Sybil Ro (oncoming nurse) by Nico Ponce (offgoing nurse). Report included the following information SBAR, Kardex and Cardiac Rhythm Sinus tach.

## 2017-07-05 NOTE — PROGRESS NOTES
GI PROGRESS NOTE    NAME:             Diamond Conrad   :              1975   MRN:              [id number]   Admit Date:     2017  Todays Date:  2017      Subjective:      Wound vac/drain      Current Facility-Administered Medications   Medication Dose Route Frequency    TPN ADULT - CENTRAL   IntraVENous CONTINUOUS    LORazepam (ATIVAN) injection 1 mg  1 mg IntraVENous Q8H    HYDROmorphone (PF) (DILAUDID) injection 1 mg  1 mg IntraVENous Q1H PRN    TPN ADULT - CENTRAL   IntraVENous CONTINUOUS    vancomycin (VANCOCIN) 1750 mg in  ml infusion  1,750 mg IntraVENous Q24H    octreotide (SANDOSTATIN) injection 50 mcg  50 mcg IntraVENous TID    insulin regular (NOVOLIN R, HUMULIN R) injection   SubCUTAneous Q6H    morphine (PF)  mg/30 ml   IntraVENous CONTINUOUS    glucose chewable tablet 16 g  4 Tab Oral PRN    glucagon (GLUCAGEN) injection 1 mg  1 mg IntraMUSCular PRN    dextrose 10 % infusion 125-250 mL  125-250 mL IntraVENous PRN    pantoprazole (PROTONIX) 40 mg in sodium chloride 0.9 % 10 mL injection  40 mg IntraVENous DAILY    albuterol-ipratropium (DUO-NEB) 2.5 MG-0.5 MG/3 ML  3 mL Nebulization Q6H PRN    clopidogrel (PLAVIX) tablet 75 mg  75 mg Per NG tube DAILY    prochlorperazine (COMPAZINE) with saline injection 5 mg  5 mg IntraVENous Q6H PRN    anidulafungin (ERAXIS) 100 mg in 0.9% sodium chloride 130 mL IVPB  100 mg IntraVENous Q24H    ondansetron (ZOFRAN) injection 8 mg  8 mg IntraVENous Q6H PRN    sodium chloride (NS) flush 5-10 mL  5-10 mL IntraVENous Q8H    sodium chloride (NS) flush 5-10 mL  5-10 mL IntraVENous PRN    sodium chloride (NS) flush 5-10 mL  5-10 mL IntraVENous Q8H    sodium chloride (NS) flush 5-10 mL  5-10 mL IntraVENous PRN    naloxone (NARCAN) injection 0.4 mg  0.4 mg IntraVENous PRN    diphenhydrAMINE (BENADRYL) injection 12.5 mg  12.5 mg IntraVENous Q4H PRN    enoxaparin (LOVENOX) injection 40 mg  40 mg SubCUTAneous Q24H    piperacillin-tazobactam (ZOSYN) 3.375 g in 0.9% sodium chloride (MBP/ADV) 100 mL  3.375 g IntraVENous Q8H    Vancomycin ---Pharmacy to Dose   Other Rx Dosing/Monitoring        Objective:     Patient Vitals for the past 8 hrs:   BP Temp Pulse Resp SpO2   07/05/17 1609 144/74 98.5 °F (36.9 °C) (!) 114 20 100 %   07/05/17 1118 140/75 98.5 °F (36.9 °C) 99 18 100 %     07/05 0701 - 07/05 1900  In: -   Out: 310 [Drains:310]  07/03 1901 - 07/05 0700  In: 3497.2 [P.O.:110; I.V.:3387.2]  Out: 5 [Urine:2100; Drains:1760]    EXAM:    Visit Vitals    /74 (BP 1 Location: Right arm, BP Patient Position: At rest)    Pulse (!) 114    Temp 98.5 °F (36.9 °C)    Resp 20    Ht 5' 4\" (1.626 m)    Wt 146.9 kg (323 lb 13.7 oz)    LMP 05/21/2017 (Approximate)    SpO2 100%    Breastfeeding No    BMI 55.59 kg/m2     General:                    Cooperative  HEENT:                     PERRLA, EOMI. Lungs:                                 CTA B  Heart:                                  s1 s2  Abdomen:                  Tender, distended, BS hypo, ostomies with drainage       Data Review     Recent Labs      07/05/17   0308  07/03/17   1122   WBC  19.3*  17.4*   HGB  7.6*  8.4*   HCT  24.5*  27.1*   PLT  392  405*     Recent Labs      07/05/17   0308  07/04/17   0430   NA  140  145   K  3.6  3.9   CL  107  111*   CO2  25  24   BUN  27*  24*   CREA  1.55*  1.57*   GLU  142*  68   PHOS  4.9*  4.5   CA  9.2  9.3     Recent Labs      07/05/17   0308   SGOT  12*   AP  99   TP  6.8   ALB  1.6*   GLOB  5.2*                              Assessment:   · Ischemic bowel s/p SMA stenting  · Crohn's Disease s/p ex lap  · EC fistula         Active Problems:    Abdominal pain (6/25/2017)      Small bowel ischemia ([address] (6/28/2017)      Overview: CT abd/pelvis 6/28/17      1. The stomach and proximal small bowel loops are distended.  There is a       loop of      small bowel in the midabdomen which is mildly dilated and does not demonstrate      enhancement in the wall and there is suspicion of pneumatosis and this       leads to      a loop of small bowel which demonstrates thickening of the wall and       findings are      suspicious for ischemia. 2. There is extensive mesenteric edema and a small amount of ascites in       the      pelvis. 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 There is nonocclusive thrombus in the SMA.             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)        Plan:   · Surgery management  · Randa Wakefield MD

## 2017-07-05 NOTE — PROGRESS NOTES
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 Vancomycin  2. Zosyn       Vitals:   Visit Vitals    /74 (BP 1 Location: Right arm, BP Patient Position: At rest)    Pulse (!) 114    Temp 98.5 °F (36.9 °C)    Resp 20    Ht 5' 4\" (1.626 m)    Wt 146.9 kg (323 lb 13.7 oz)    LMP 2017 (Approximate)    SpO2 100%    Breastfeeding No    BMI 55.59 kg/m2        Tmax:  Temp (24hrs), Av.5 °F (36.9 °C), Min:97.8 °F (36.6 °C), Max:99 °F (37.2 °C)      Exam:  Lungs:  clear to auscultation bilaterally  Heart:  regularly irregular rhythm  Abdomen:  abnormal findings:  tenderness moderate in the entire abdomen, hypoactive bowel sounds    Labs:      Recent Labs      17   0308  17   0430  17   1122  17   0400   WBC  19.3*   --   17.4*   --    HGB  7.6*   --   8.4*   --    PLT  392   --   405*   --    BUN  27*  24*   --   21*   CREA  1.55*  1.57*   --   1.42*   SGOT  12*   --    --    --    AP  99   --    --    --    TBILI  0.7   --    --    --        Cultures:     Lab Results   Component Value Date/Time    Specimen Description: URINE 2009 07:45 PM     Lab Results   Component Value Date/Time    Culture result: NO GROWTH ON SOLID MEDIA AT 4 DAYS 2017 12:17 PM    Culture result:  2017 12:17 PM     **METHICILLIN RESISTANT STAPHYLOCOCCUS AUREUS**  ISOLATED FROM THIO BROTH ONLY      Culture result: NO GROWTH 5 DAYS 2017 02:35 PM       Radiology:     Line/Insert Date:           Assessment:     1. Ischemic gut with frozen abdomen  2. Leukocytosis--slight improvement  3. Cultures negative to date    Objective:     1. Continue IV therapy  2.  Follow up cultures and studies    Leslie Moseley MD

## 2017-07-05 NOTE — PROGRESS NOTES
07/05/17 1609   Vitals   Temp 98.5 °F (36.9 °C)   Temp Source Oral   Pulse (Heart Rate) (!) 114   Heart Rate Source Monitor   Resp Rate 20   O2 Sat (%) 100 %   Level of Consciousness Alert   /74   MAP (Calculated) 97   BP 1 Location Right arm   BP 1 Method Automatic   BP Patient Position At rest   MEWS Score 3     Pt is in pain and just returned to bed from the commode. Heart rate slowly coming down and will continue to monitor. At 1655 .

## 2017-07-05 NOTE — WOUND CARE
WOCN Note:     Follow-up visit for fistulized midline incision. Seen with Dr. Ida Silver today. Chart shows:  Admitted for nausea, vomiting, and abdominal pain; history of Crohn's, DVT, bowel perforation with surgery 6/7/17. WBC = 19.3  On vancomycin    Assessment:   Patient is A&O x 4, continent and mobile - uses bedside commode independently. Bed: total care bariatric with air mattress   Diet: TPN  Patient reports generalized pain in abdomen. Buttocks and sacrum intact and without erythema. Midline incision with dehisence proximally:  1. POA, 4.5 x 2 x 3.5 cm  Undermining @ 12 o'clock = 4.5, 3 oclock = 5 cm and 6 o'clock = 6cm  Visible base has 80% yellow fibrinous tissue and 20% moist red. Large amount of green clear exudate. Periwound with slight flare of inflammation. Small Hawk's wound manager applied using paste and connect to wall suction via red rubber catheter previously set up by Dr. Ida Silver. Rest of incision with staples but with skip areas of leaking large amount of clear serous exudate. Incisional VAC applied @ 125 mmHg continuous suction using black foam and skin protected by drape. Recommendations:    Maintain VAC @ 125 mmHg continuous suction. Change cannister as needed. Maintain fistula pouch and change as needed with leaks. Discussed above plan with patient & RNAkua. Transition of Care: Plan to follow as needed while admitted to hospital.  Dr. Ida Silver plans to take her to surgery on Friday with possible VAC placement after fistula closure.      SONY Baptiste, RN, [facility]  Certified Wound, Ostomy, Continence Nurse  office [phone]  pager [phone] or call  to page

## 2017-07-05 NOTE — PROGRESS NOTES
Bedside and Verbal shift change report given to Ana Sparks (oncoming nurse) by Inocente Menjivar (offgoing nurse). Report included the following information SBAR, ED Summary, Procedure Summary, Intake/Output, MAR, Accordion, Recent Results, Med Rec Status and Cardiac Rhythm Normal Sinus.

## 2017-07-06 LAB
ANION GAP BLD CALC-SCNC: 7 MMOL/L (ref 5–15)
BUN SERPL-MCNC: 28 MG/DL (ref 6–20)
BUN/CREAT SERPL: 17 (ref 12–20)
CALCIUM SERPL-MCNC: 9 MG/DL (ref 8.5–10.1)
CHLORIDE SERPL-SCNC: 109 MMOL/L (ref 97–108)
CO2 SERPL-SCNC: 26 MMOL/L (ref 21–32)
CREAT SERPL-MCNC: 1.66 MG/DL (ref 0.55–1.02)
ERYTHROCYTE [DISTWIDTH] IN BLOOD BY AUTOMATED COUNT: 15.8 % (ref 11.5–14.5)
GLUCOSE BLD STRIP.AUTO-MCNC: 104 MG/DL (ref 65–100)
GLUCOSE BLD STRIP.AUTO-MCNC: 143 MG/DL (ref 65–100)
GLUCOSE BLD STRIP.AUTO-MCNC: 155 MG/DL (ref 65–100)
GLUCOSE BLD STRIP.AUTO-MCNC: 195 MG/DL (ref 65–100)
GLUCOSE SERPL-MCNC: 115 MG/DL (ref 65–100)
HCT VFR BLD AUTO: 23.4 % (ref 35–47)
HGB BLD-MCNC: 7.4 G/DL (ref 11.5–16)
MAGNESIUM SERPL-MCNC: 2.1 MG/DL (ref 1.6–2.4)
MCH RBC QN AUTO: 28.4 PG (ref 26–34)
MCHC RBC AUTO-ENTMCNC: 31.6 G/DL (ref 30–36.5)
MCV RBC AUTO: 89.7 FL (ref 80–99)
PHOSPHATE SERPL-MCNC: 4.9 MG/DL (ref 2.6–4.7)
PLATELET # BLD AUTO: 393 K/UL (ref 150–400)
POTASSIUM SERPL-SCNC: 3.8 MMOL/L (ref 3.5–5.1)
RBC # BLD AUTO: 2.61 M/UL (ref 3.8–5.2)
SERVICE CMNT-IMP: ABNORMAL
SODIUM SERPL-SCNC: 142 MMOL/L (ref 136–145)
WBC # BLD AUTO: 16.3 K/UL (ref 3.6–11)

## 2017-07-06 PROCEDURE — 74011000258 HC RX REV CODE- 258: Performed by: NURSE PRACTITIONER

## 2017-07-06 PROCEDURE — 82962 GLUCOSE BLOOD TEST: CPT

## 2017-07-06 PROCEDURE — 77030019952 HC CANSTR VAC ASST KCON -B

## 2017-07-06 PROCEDURE — 74011636637 HC RX REV CODE- 636/637: Performed by: NURSE PRACTITIONER

## 2017-07-06 PROCEDURE — 77030018717 HC DRSG GRNUFM KCON -B

## 2017-07-06 PROCEDURE — 74011000258 HC RX REV CODE- 258: Performed by: SURGERY

## 2017-07-06 PROCEDURE — 74011000250 HC RX REV CODE- 250: Performed by: NURSE PRACTITIONER

## 2017-07-06 PROCEDURE — 74011000258 HC RX REV CODE- 258: Performed by: INTERNAL MEDICINE

## 2017-07-06 PROCEDURE — 74011250636 HC RX REV CODE- 250/636: Performed by: NURSE PRACTITIONER

## 2017-07-06 PROCEDURE — 85027 COMPLETE CBC AUTOMATED: CPT | Performed by: SURGERY

## 2017-07-06 PROCEDURE — 36415 COLL VENOUS BLD VENIPUNCTURE: CPT | Performed by: SURGERY

## 2017-07-06 PROCEDURE — 74011250636 HC RX REV CODE- 250/636: Performed by: SURGERY

## 2017-07-06 PROCEDURE — 80048 BASIC METABOLIC PNL TOTAL CA: CPT | Performed by: SURGERY

## 2017-07-06 PROCEDURE — 74011000250 HC RX REV CODE- 250: Performed by: SURGERY

## 2017-07-06 PROCEDURE — 74011250636 HC RX REV CODE- 250/636: Performed by: PHYSICAL MEDICINE & REHABILITATION

## 2017-07-06 PROCEDURE — 74011250636 HC RX REV CODE- 250/636: Performed by: INTERNAL MEDICINE

## 2017-07-06 PROCEDURE — C9113 INJ PANTOPRAZOLE SODIUM, VIA: HCPCS | Performed by: SURGERY

## 2017-07-06 PROCEDURE — 84100 ASSAY OF PHOSPHORUS: CPT | Performed by: SURGERY

## 2017-07-06 PROCEDURE — 74011636637 HC RX REV CODE- 636/637: Performed by: SURGERY

## 2017-07-06 PROCEDURE — 3331090002 HH PPS REVENUE DEBIT

## 2017-07-06 PROCEDURE — 83735 ASSAY OF MAGNESIUM: CPT | Performed by: SURGERY

## 2017-07-06 PROCEDURE — 3331090001 HH PPS REVENUE CREDIT

## 2017-07-06 PROCEDURE — 65660000000 HC RM CCU STEPDOWN

## 2017-07-06 RX ADMIN — Medication: at 07:51

## 2017-07-06 RX ADMIN — HYDROMORPHONE HYDROCHLORIDE 1 MG: 1 INJECTION, SOLUTION INTRAMUSCULAR; INTRAVENOUS; SUBCUTANEOUS at 13:29

## 2017-07-06 RX ADMIN — VANCOMYCIN HYDROCHLORIDE 1750 MG: 10 INJECTION, POWDER, LYOPHILIZED, FOR SOLUTION INTRAVENOUS at 03:45

## 2017-07-06 RX ADMIN — SODIUM CHLORIDE 40 MG: 9 INJECTION INTRAMUSCULAR; INTRAVENOUS; SUBCUTANEOUS at 09:25

## 2017-07-06 RX ADMIN — PIPERACILLIN SODIUM,TAZOBACTAM SODIUM 3.38 G: 3; .375 INJECTION, POWDER, FOR SOLUTION INTRAVENOUS at 06:58

## 2017-07-06 RX ADMIN — OCTREOTIDE ACETATE 50 MCG: 50 INJECTION, SOLUTION INTRAVENOUS; SUBCUTANEOUS at 21:02

## 2017-07-06 RX ADMIN — PIPERACILLIN SODIUM,TAZOBACTAM SODIUM 3.38 G: 3; .375 INJECTION, POWDER, FOR SOLUTION INTRAVENOUS at 21:02

## 2017-07-06 RX ADMIN — Medication: at 19:48

## 2017-07-06 RX ADMIN — HYDROMORPHONE HYDROCHLORIDE 1 MG: 1 INJECTION, SOLUTION INTRAMUSCULAR; INTRAVENOUS; SUBCUTANEOUS at 09:41

## 2017-07-06 RX ADMIN — HYDROMORPHONE HYDROCHLORIDE 1 MG: 1 INJECTION, SOLUTION INTRAMUSCULAR; INTRAVENOUS; SUBCUTANEOUS at 18:25

## 2017-07-06 RX ADMIN — HUMAN INSULIN 2 UNITS: 100 INJECTION, SOLUTION SUBCUTANEOUS at 06:58

## 2017-07-06 RX ADMIN — Medication 1 MG: at 06:57

## 2017-07-06 RX ADMIN — HYDROMORPHONE HYDROCHLORIDE 1 MG: 1 INJECTION, SOLUTION INTRAMUSCULAR; INTRAVENOUS; SUBCUTANEOUS at 04:19

## 2017-07-06 RX ADMIN — Medication 1 MG: at 21:02

## 2017-07-06 RX ADMIN — OCTREOTIDE ACETATE 50 MCG: 50 INJECTION, SOLUTION INTRAVENOUS; SUBCUTANEOUS at 16:20

## 2017-07-06 RX ADMIN — PIPERACILLIN SODIUM,TAZOBACTAM SODIUM 3.38 G: 3; .375 INJECTION, POWDER, FOR SOLUTION INTRAVENOUS at 13:32

## 2017-07-06 RX ADMIN — ENOXAPARIN SODIUM 40 MG: 40 INJECTION SUBCUTANEOUS at 23:10

## 2017-07-06 RX ADMIN — HUMAN INSULIN 2 UNITS: 100 INJECTION, SOLUTION SUBCUTANEOUS at 00:07

## 2017-07-06 RX ADMIN — CALCIUM GLUCONATE: 94 INJECTION, SOLUTION INTRAVENOUS at 18:23

## 2017-07-06 RX ADMIN — OCTREOTIDE ACETATE 50 MCG: 50 INJECTION, SOLUTION INTRAVENOUS; SUBCUTANEOUS at 09:25

## 2017-07-06 RX ADMIN — Medication: at 11:16

## 2017-07-06 RX ADMIN — Medication 1 MG: at 13:29

## 2017-07-06 RX ADMIN — HYDROMORPHONE HYDROCHLORIDE 1 MG: 1 INJECTION, SOLUTION INTRAMUSCULAR; INTRAVENOUS; SUBCUTANEOUS at 20:59

## 2017-07-06 RX ADMIN — SODIUM CHLORIDE 100 MG: 900 INJECTION, SOLUTION INTRAVENOUS at 18:25

## 2017-07-06 RX ADMIN — Medication 10 ML: at 16:21

## 2017-07-06 RX ADMIN — HYDROMORPHONE HYDROCHLORIDE 1 MG: 1 INJECTION, SOLUTION INTRAMUSCULAR; INTRAVENOUS; SUBCUTANEOUS at 11:42

## 2017-07-06 RX ADMIN — HUMAN INSULIN 2 UNITS: 100 INJECTION, SOLUTION SUBCUTANEOUS at 12:58

## 2017-07-06 RX ADMIN — ENOXAPARIN SODIUM 40 MG: 40 INJECTION SUBCUTANEOUS at 00:06

## 2017-07-06 RX ADMIN — HYDROMORPHONE HYDROCHLORIDE 1 MG: 1 INJECTION, SOLUTION INTRAMUSCULAR; INTRAVENOUS; SUBCUTANEOUS at 16:20

## 2017-07-06 NOTE — PROGRESS NOTES
Problem: Patient Education: Go to Patient Education Activity  Goal: Patient/Family Education  Outcome: Not Progressing Towards Goal  OFF PATHWAY-RETURNING TO SURGERY TOMORROW-Friday. PICC LINE FOR TPN, PCA, ILEOSTOMY, CHRIS WOUND VAC TO MIDLINE OPEN WOUND-PALLIATIVE, PULMONARY AND iNFECTIOUS DISEASE ON BOARD WITH SURGERY. EMOTIONALLY LABILE-MUSIC THERAPY ON BOARD TOO.

## 2017-07-06 NOTE — PROGRESS NOTES
GI PROGRESS NOTE    NAME:             Casimiro Bowman   :              1975   MRN:              [id number]   Admit Date:     2017  Todays Date:  2017      Subjective:     Plan for OR tomorrow      Current Facility-Administered Medications   Medication Dose Route Frequency    TPN ADULT - CENTRAL   IntraVENous CONTINUOUS    LORazepam (ATIVAN) injection 1 mg  1 mg IntraVENous Q8H    HYDROmorphone (PF) (DILAUDID) injection 1 mg  1 mg IntraVENous Q1H PRN    aspirin chewable tablet 81 mg  81 mg Oral DAILY    vancomycin (VANCOCIN) 1750 mg in  ml infusion  1,750 mg IntraVENous Q24H    octreotide (SANDOSTATIN) injection 50 mcg  50 mcg IntraVENous TID    insulin regular (NOVOLIN R, HUMULIN R) injection   SubCUTAneous Q6H    morphine (PF)  mg/30 ml   IntraVENous CONTINUOUS    glucose chewable tablet 16 g  4 Tab Oral PRN    glucagon (GLUCAGEN) injection 1 mg  1 mg IntraMUSCular PRN    dextrose 10 % infusion 125-250 mL  125-250 mL IntraVENous PRN    pantoprazole (PROTONIX) 40 mg in sodium chloride 0.9 % 10 mL injection  40 mg IntraVENous DAILY    albuterol-ipratropium (DUO-NEB) 2.5 MG-0.5 MG/3 ML  3 mL Nebulization Q6H PRN    prochlorperazine (COMPAZINE) with saline injection 5 mg  5 mg IntraVENous Q6H PRN    anidulafungin (ERAXIS) 100 mg in 0.9% sodium chloride 130 mL IVPB  100 mg IntraVENous Q24H    ondansetron (ZOFRAN) injection 8 mg  8 mg IntraVENous Q6H PRN    sodium chloride (NS) flush 5-10 mL  5-10 mL IntraVENous Q8H    sodium chloride (NS) flush 5-10 mL  5-10 mL IntraVENous PRN    sodium chloride (NS) flush 5-10 mL  5-10 mL IntraVENous Q8H    sodium chloride (NS) flush 5-10 mL  5-10 mL IntraVENous PRN    naloxone (NARCAN) injection 0.4 mg  0.4 mg IntraVENous PRN    diphenhydrAMINE (BENADRYL) injection 12.5 mg  12.5 mg IntraVENous Q4H PRN    enoxaparin (LOVENOX) injection 40 mg  40 mg SubCUTAneous Q24H    piperacillin-tazobactam (ZOSYN) 3.375 g in 0.9% sodium chloride (MBP/ADV) 100 mL  3.375 g IntraVENous Q8H    Vancomycin ---Pharmacy to Dose   Other Rx Dosing/Monitoring        Objective:     Patient Vitals for the past 8 hrs:   BP Temp Pulse Resp SpO2   07/06/17 0848 109/52 98.4 °F (36.9 °C) 91 17 99 %   07/06/17 0343 (!) 126/98 97.7 °F (36.5 °C) 95 17 97 %        07/04 1901 - 07/06 0700  In: 8851.7 [P.O.:30; I.V.:8821.7]  Out: 0486 [Urine:1950; Drains:2455]    EXAM:    Visit Vitals    /52 (BP 1 Location: Right arm, BP Patient Position: At rest)    Pulse 91    Temp 98.4 °F (36.9 °C)    Resp 17    Ht 5' 4\" (1.626 m)    Wt 142.4 kg (313 lb 15 oz)    LMP 05/21/2017 (Approximate)    SpO2 99%    Breastfeeding No    BMI 53.89 kg/m2     General:                    Cooperative  HEENT:                     PERRLA, EOMI. Lungs:                                 CTA B  Heart:                                  s1 s2  Abdomen:                  Tender, distended, BS hypo, ostomies with drainage       Data Review     Recent Labs      07/06/17   0420  07/05/17   0308   WBC  16.3*  19.3*   HGB  7.4*  7.6*   HCT  23.4*  24.5*   PLT  393  392     Recent Labs      07/06/17   0420  07/05/17   0308   NA  142  140   K  3.8  3.6   CL  109*  107   CO2  26  25   BUN  28*  27*   CREA  1.66*  1.55*   GLU  115*  142*   PHOS  4.9*  4.9*   CA  9.0  9.2     Recent Labs      07/05/17   0308   SGOT  12*   AP  99   TP  6.8   ALB  1.6*   GLOB  5.2*                              Assessment:   · Ischemic bowel s/p SMA stenting  · Crohn's Disease s/p ex lap  · EC fistula         Active Problems:    Abdominal pain (6/25/2017)      Small bowel ischemia ([address] (6/28/2017)      Overview: CT abd/pelvis 6/28/17      1. The stomach and proximal small bowel loops are distended.  There is a       loop of      small bowel in the midabdomen which is mildly dilated and does not       demonstrate      enhancement in the wall and there is suspicion of pneumatosis and this       leads to      a loop of small bowel which demonstrates thickening of the wall and       findings are      suspicious for ischemia. 2. There is extensive mesenteric edema and a small amount of ascites in       the      pelvis. 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 There is nonocclusive thrombus in the SMA.             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

## 2017-07-06 NOTE — PROGRESS NOTES
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 Vancomycin  2. Zosyn       Vitals:   Visit Vitals    /85 (BP 1 Location: Right arm, BP Patient Position: At rest)    Pulse (!) 108    Temp 97.9 °F (36.6 °C)    Resp 18    Ht 5' 4\" (1.626 m)    Wt 142.4 kg (313 lb 15 oz)    LMP 2017 (Approximate)    SpO2 100%    Breastfeeding No    BMI 53.89 kg/m2        Tmax:  Temp (24hrs), Av.2 °F (36.8 °C), Min:97.7 °F (36.5 °C), Max:98.4 °F (36.9 °C)      Exam:  Lungs:  clear to auscultation bilaterally  Heart:  regularly irregular rhythm  Abdomen:  abnormal findings:  tenderness moderate in the entire abdomen, hypoactive bowel sounds    Labs:      Recent Labs      17   0420  17   0308  17   0430   WBC  16.3*  19.3*   --    HGB  7.4*  7.6*   --    PLT  393  392   --    BUN  28*  27*  24*   CREA  1.66*  1.55*  1.57*   SGOT   --   12*   --    AP   --   99   --    TBILI   --   0.7   --        Cultures:     Lab Results   Component Value Date/Time    Specimen Description: URINE 2009 07:45 PM     Lab Results   Component Value Date/Time    Culture result: NO GROWTH ON SOLID MEDIA AT 4 DAYS 2017 12:17 PM    Culture result:  2017 12:17 PM     **METHICILLIN RESISTANT STAPHYLOCOCCUS AUREUS**  ISOLATED FROM THIO BROTH ONLY      Culture result: NO GROWTH 5 DAYS 2017 02:35 PM       Radiology:     Line/Insert Date:           Assessment:     1. Ischemic gut with frozen abdomen  2. Leukocytosis--steady improvement  3. Cultures negative to date    Objective:     1. Continue IV therapy  2. Follow up cultures and studies  3.  Parisa Vidal MD

## 2017-07-06 NOTE — PROGRESS NOTES
Daily Progress Note  [facility] General Surgery at [address] Date: 2017  Post-Operative Day: 8 from Procedure(s):  LAPAROTOMY EXPLORATORY BOWEL RESECTION, SMA STENT LEFT     Subjective:     Last 24 hrs: Just woke up, feeling a bit groggy and behind on pca. Otherwise doing OK. Continues on TPN, eraxis, zosyn, and vanc. WBC trending down. Objective:     Blood pressure 128/72, pulse 92, temperature 98.3 °F (36.8 °C), resp. rate 18, height 5' 4\" (1.626 m), weight 142.4 kg (313 lb 15 oz), last menstrual period 2017, SpO2 100 %, not currently breastfeeding. Temp (24hrs), Av.3 °F (36.8 °C), Min:97.7 °F (36.5 °C), Max:98.5 °F (36.9 °C)      _____________________  Physical Exam:     Alert and Oriented, lying in bed, appears uncomfortable, calm & cooperative. Cardiovascular: RRR  Lungs:CTAB   Abdomen: Midline wound with enteric drainage, CHRIS with enteric drainage, wound vac over midline, functioning ostomy. Assessment:   Active Problems:    Abdominal pain (2017)      Small bowel ischemia ([address] (2017)      Overview: CT abd/pelvis 17      1. The stomach and proximal small bowel loops are distended. There is a       loop of      small bowel in the midabdomen which is mildly dilated and does not       demonstrate      enhancement in the wall and there is suspicion of pneumatosis and this       leads to      a loop of small bowel which demonstrates thickening of the wall and       findings are      suspicious for ischemia. 2. There is extensive mesenteric edema and a small amount of ascites in       the      pelvis. 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 There is nonocclusive thrombus in the SMA.             Enterocutaneous fistula (2017)            Plan:     Renew TPN, adjust to cycled regimen  Continue abx  Plan for OR with Dr. Meredith Catching in am  Further plan per Dr. Anais Odonnell, ACNP - [address] [facility] Surgery at [facility] [address],   [address] [facility], [address], [address] [facility]  [phone]    Data Review:    Recent Labs      07/06/17 0420  07/05/17   0308   WBC  16.3*  19.3*   HGB  7.4*  7.6*   HCT  23.4*  24.5*   PLT  393  392     Recent Labs      07/06/17   0420  07/05/17   0308  07/04/17   0430   NA  142  140  145   K  3.8  3.6  3.9   CL  109*  107  111*   CO2  26  25  24   GLU  115*  142*  68   BUN  28*  27*  24*   CREA  1.66*  1.55*  1.57*   CA  9.0  9.2  9.3   MG  2.1  1.7  1.8   PHOS  4.9*  4.9*  4.5   ALB   --   1.6*   --    TBILI   --   0.7   --    SGOT   --   12*   --    ALT   --   14   --      No results for input(s): AML, LPSE in the last 72 hours.         ______________________  Medications:    Current Facility-Administered Medications   Medication Dose Route Frequency    TPN ADULT - CENTRAL   IntraVENous TITRATE    [START ON 7/7/2017] fat emulsion 20% (LIPOSYN, INTRALIPID) infusion 500 mL  500 mL IntraVENous Q MON, WED & FRI    TPN ADULT - CENTRAL   IntraVENous CONTINUOUS    LORazepam (ATIVAN) injection 1 mg  1 mg IntraVENous Q8H    HYDROmorphone (PF) (DILAUDID) injection 1 mg  1 mg IntraVENous Q1H PRN    aspirin chewable tablet 81 mg  81 mg Oral DAILY    vancomycin (VANCOCIN) 1750 mg in  ml infusion  1,750 mg IntraVENous Q24H    octreotide (SANDOSTATIN) injection 50 mcg  50 mcg IntraVENous TID    insulin regular (NOVOLIN R, HUMULIN R) injection   SubCUTAneous Q6H    morphine (PF)  mg/30 ml   IntraVENous CONTINUOUS    glucose chewable tablet 16 g  4 Tab Oral PRN    glucagon (GLUCAGEN) injection 1 mg  1 mg IntraMUSCular PRN    dextrose 10 % infusion 125-250 mL  125-250 mL IntraVENous PRN    pantoprazole (PROTONIX) 40 mg in sodium chloride 0.9 % 10 mL injection  40 mg IntraVENous DAILY    albuterol-ipratropium (DUO-NEB) 2.5 MG-0.5 MG/3 ML  3 mL Nebulization Q6H PRN    prochlorperazine (COMPAZINE) with saline injection 5 mg  5 mg IntraVENous Q6H PRN    anidulafungin (ERAXIS) 100 mg in 0.9% sodium chloride 130 mL IVPB  100 mg IntraVENous Q24H    ondansetron (ZOFRAN) injection 8 mg  8 mg IntraVENous Q6H PRN    sodium chloride (NS) flush 5-10 mL  5-10 mL IntraVENous Q8H    sodium chloride (NS) flush 5-10 mL  5-10 mL IntraVENous PRN    sodium chloride (NS) flush 5-10 mL  5-10 mL IntraVENous Q8H    sodium chloride (NS) flush 5-10 mL  5-10 mL IntraVENous PRN    naloxone (NARCAN) injection 0.4 mg  0.4 mg IntraVENous PRN    diphenhydrAMINE (BENADRYL) injection 12.5 mg  12.5 mg IntraVENous Q4H PRN    enoxaparin (LOVENOX) injection 40 mg  40 mg SubCUTAneous Q24H    piperacillin-tazobactam (ZOSYN) 3.375 g in 0.9% sodium chloride (MBP/ADV) 100 mL  3.375 g IntraVENous Q8H    Vancomycin ---Pharmacy to Dose   Other Rx Dosing/Monitoring

## 2017-07-06 NOTE — PROGRESS NOTES
1700- Pt called out because her drain was \"leaking\". Red catheter that was inserted into bag to drain fluids was laying on the floor. Wound care team was not in the office. Replaced catheter tip per Nida Mcgarry, the charge nurse.

## 2017-07-06 NOTE — ROUTINE PROCESS
Bedside shift change report given to Pito Chavarria RN (oncoming nurse) by Chris Wang RN (offgoing nurse). Report included the following information SBAR, ED Summary, Procedure Summary, Intake/Output, Recent Results, Med Rec Status and Cardiac Rhythm sinus tachycardia.

## 2017-07-06 NOTE — PROGRESS NOTES
Palliative Medicine Consult  [place]: [phone] ([phone])    Patient Name: Domingo Ayala  YOB: 1975    Date of Initial Consult: 6/27/17  Reason for Consult: overwhelming symptoms  Requesting Provider: Adrianne Jones NP  Primary Care Physician: Elzbieta Nunes MD      SUMMARY:   Domingo Ayala is a 46 y.o. with a past history of Crohn's disease,anxiety,  chronic pain, hx DVT, multiple (4) small bowel resections, s/p recent urgent dx lap, with lysis of adhesions, repair of suspected perf 6/7/17 , who was admitted on 6/25/2017 from home with a diagnosis of worsening abdominal pain, elevated WBC and MRSA wound infection. Initially consulted for pain management thought to be possible Crohn's flare. Initial CT on 6/25 w/out acute findings but repeat on 6/28 showing ischemic bowl. S/p ex lap but ischemic bowl unresectable, s/p SMA stent. Extubated on 6/30. Patient is followed chronically for pain management by Dr. Patti Blackman at [address] [place] [address]. Home regimen for chronic abdominal pain is MS Contin 60 Q8 and MS IR 30 Q6 PRN. She also takes Xanax 1mg TID prn anxiety.  reviewed, opioids last prescribed on 5/19/17. PALLIATIVE DIAGNOSES:     1. Abd pain, acute on chronic   2. Anxiety, acute on chronic   3. Nausea  4. Crohn's disease  5. Chronic constipation on home relistor   6. MRSA wound        PLAN:   1. Pt remains NPO, on TPN. Found to have new proximal fistula which needs surgical intervention. Surgery planned for Friday. 2. PCA reviewed, Morphine PCA 4mg/h basal, increased to 5mg every 10 min demand on 7/3. She uses about 2 demands every hour. She reports increased pain with frequent dressing changes due to copious drainage from the fistula, being moved for CT scan, etc. Discussed with RN about pro actively administering IV dilaudid 1 mg prior to dressing changes.  She does have pain from the gastric juices irritating her skin, etc.  3. Continued support for her ongoing distress with coping. She continues to recollect being treated poorly by staff. She feels better now with current staff but is distressed over who she will get next. Requested that she or mom talk to the staff if they feel she is being mis treated, d/w RN manager and patient advocate. Also asked her to call us to be advocates especially for adequate pain management. 4. Monitor for sedation, does have narcan order. 5. Emotional support provided. Reassured that we will follow closely to help with pain control after surgery. 6. When can tolerate po will help adjust back to po medications incl home MSContin. May also benefit from low dose gabapentin for some neuropathic incisional pain. 7. Bowel regimen per surgery team.   8. Communicated plan of care with: Palliative IDT; Dr. Coley Rape / TREATMENT PREFERENCES:   [====Goals of Care====]  GOALS OF CARE:  Patient / health care proxy stated goals: pain control  Recovery from acute issues      TREATMENT PREFERENCES:   Code Status: Full Code    Advance Care Planning:  Advance Care Planning 6/26/2017   Patient's Healthcare Decision Maker is: Legal Next Cirilo Jiménez   Primary Decision Maker Name Quinn Dominguez   Primary Decision Maker Phone Number [phone]   Primary Decision Maker Relationship to Patient Parent   Confirm Advance Directive None   Patient Would Like to Complete Advance Directive No       Other:    The palliative care team has discussed with patient / health care proxy about goals of care / treatment preferences for patient.  [====Goals of Care====]         HISTORY:         HPI/SUBJECTIVE:    The patient is:   [x] Verbal and participatory  [] Non-participatory due to: Medical condition     Very emotional, crying- afraid of pain, states \" the Universe is against me\". Mom at bedside, supportive,  Med rec reviewed, received 3 doses of IV dilaudid 1 mg. Slept okay for a few hours but woke up to complete soakage of dressing and pain.         Clinical Pain Assessment (nonverbal scale for severity on nonverbal patients):   [++++ Clinical Pain Assessment++++]  [++++Pain Severity++++]: Pain: 6  [++++Pain Character++++]: sharp and burning, stabbing  [++++Pain Duration++++]: several days  [++++Pain Effect++++]: hard to walk, feeling anxious  [++++Pain Factors++++]: better after pain medicaion  [++++Pain Frequency++++]: constant  [++++Pain Location++++]: across abdomen both sides in middle at incision site  [++++ Clinical Pain Assessment++++]     FUNCTIONAL ASSESSMENT:     Palliative Performance Scale (PPS):  PPS: 50       PSYCHOSOCIAL/SPIRITUAL SCREENING:     Advance Care Planning:  Advance Care Planning 6/26/2017   Patient's Healthcare Decision Maker is: Legal Next of Camilo 71   Primary Decision Maker Name Gustavo Johnson   Primary Decision Maker Phone Number [phone]   Primary Decision Maker Relationship to Patient Parent   Confirm Advance Directive None   Patient Would Like to Complete Advance Directive No        Any spiritual / Jain concerns:  [] Yes /  [x] No    Caregiver Burnout:  [] Yes /  [x] No /  [] No Caregiver Present      Anticipatory grief assessment:   [x] Normal  / [] Maladaptive       ESAS Anxiety: Anxiety: 0    ESAS Depression: Depression: 0        REVIEW OF SYSTEMS:     Positive and pertinent negative findings in ROS are noted above in HPI. The following systems were [x] reviewed / [] unable to be reviewed as noted in HPI  Other findings are noted below. Systems: constitutional, ears/nose/mouth/throat, respiratory, gastrointestinal, genitourinary, musculoskeletal, integumentary, neurologic, psychiatric, endocrine. Positive findings noted below.   Modified ESAS Completed by: provider   Fatigue: 6 Drowsiness: 0   Depression: 0 Pain: 6   Anxiety: 0 Nausea: 0   Anorexia: 0 Dyspnea: 0     Constipation: No              PHYSICAL EXAM:     From RN flowsheet:  Wt Readings from Last 3 Encounters:   07/06/17 313 lb 15 oz (142.4 kg)   06/25/17 343 lb (155.6 kg)   06/22/17 343 lb (155.6 kg)     Blood pressure 109/52, pulse 91, temperature 98.4 °F (36.9 °C), resp. rate 17, height 5' 4\" (1.626 m), weight 313 lb 15 oz (142.4 kg), last menstrual period 05/21/2017, SpO2 99 %, not currently breastfeeding. Pain Scale 1: Numeric (0 - 10)  Pain Intensity 1: 7  Pain Onset 1: chr  Pain Location 1: Abdomen  Pain Orientation 1: Medial  Pain Description 1: Sharp, Aching  Pain Intervention(s) 1: Encouraged PCA  Last bowel movement, if known: stool in ostomy     Constitutional:awake, alert, oriented, tearful and axious   Eyes: pupils equal, anicteric  ENMT: no nasal discharge, moist mucous membranes  Respiratory: breathing not labored  Gastrointestinal: midline incision w/ dressing in place,  +ostomy w/out stool, hypoactive bowel sounds   Musculoskeletal: no deformity, no tenderness to palpation  Skin: warm, dry  No edema  Neurologic: moving all extremities  Psych: anxious      HISTORY:     Active Problems:    Abdominal pain (6/25/2017)      Small bowel ischemia (HCC) (6/28/2017)      Overview: CT abd/pelvis 6/28/17      1. The stomach and proximal small bowel loops are distended. There is a       loop of      small bowel in the midabdomen which is mildly dilated and does not       demonstrate      enhancement in the wall and there is suspicion of pneumatosis and this       leads to      a loop of small bowel which demonstrates thickening of the wall and       findings are      suspicious for ischemia. 2. There is extensive mesenteric edema and a small amount of ascites in       the      pelvis. 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 There is nonocclusive thrombus in the SMA.             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 Fischer Sites History   Problem Relation Age of Onset    Hypertension Father     Stroke Father     Other Father      arthritis      History reviewed, no pertinent family history.   Social History   Substance Use Topics    Smoking status: Former Smoker    Smokeless tobacco: Not on file    Alcohol use No     Allergies   Allergen Reactions    Demerol [Meperidine] Unknown (comments)    Soma [Carisoprodol] Rash and Nausea Only    Sulfa (Sulfonamide Antibiotics) Unknown (comments)    Toradol [Ketorolac Tromethamine] Unknown (comments)      Current Facility-Administered Medications   Medication Dose Route Frequency    TPN ADULT - CENTRAL   IntraVENous TITRATE    [START ON 7/7/2017] fat emulsion 20% (LIPOSYN, INTRALIPID) infusion 500 mL  500 mL IntraVENous Q MON, WED & FRI    TPN ADULT - CENTRAL   IntraVENous CONTINUOUS    LORazepam (ATIVAN) injection 1 mg  1 mg IntraVENous Q8H    HYDROmorphone (PF) (DILAUDID) injection 1 mg  1 mg IntraVENous Q1H PRN    aspirin chewable tablet 81 mg  81 mg Oral DAILY    vancomycin (VANCOCIN) 1750 mg in  ml infusion  1,750 mg IntraVENous Q24H    octreotide (SANDOSTATIN) injection 50 mcg  50 mcg IntraVENous TID    insulin regular (NOVOLIN R, HUMULIN R) injection   SubCUTAneous Q6H    morphine (PF)  mg/30 ml   IntraVENous CONTINUOUS    glucose chewable tablet 16 g  4 Tab Oral PRN    glucagon (GLUCAGEN) injection 1 mg  1 mg IntraMUSCular PRN    dextrose 10 % infusion 125-250 mL  125-250 mL IntraVENous PRN    pantoprazole (PROTONIX) 40 mg in sodium chloride 0.9 % 10 mL injection  40 mg IntraVENous DAILY    albuterol-ipratropium (DUO-NEB) 2.5 MG-0.5 MG/3 ML  3 mL Nebulization Q6H PRN    prochlorperazine (COMPAZINE) with saline injection 5 mg  5 mg IntraVENous Q6H PRN    anidulafungin (ERAXIS) 100 mg in 0.9% sodium chloride 130 mL IVPB  100 mg IntraVENous Q24H    ondansetron (ZOFRAN) injection 8 mg  8 mg IntraVENous Q6H PRN    sodium chloride (NS) flush 5-10 mL  5-10 mL IntraVENous Q8H    sodium chloride (NS) flush 5-10 mL  5-10 mL IntraVENous PRN    sodium chloride (NS) flush 5-10 mL  5-10 mL IntraVENous Q8H    sodium chloride (NS) flush 5-10 mL  5-10 mL IntraVENous PRN    naloxone (NARCAN) injection 0.4 mg  0.4 mg IntraVENous PRN    diphenhydrAMINE (BENADRYL) injection 12.5 mg  12.5 mg IntraVENous Q4H PRN    enoxaparin (LOVENOX) injection 40 mg  40 mg SubCUTAneous Q24H    piperacillin-tazobactam (ZOSYN) 3.375 g in 0.9% sodium chloride (MBP/ADV) 100 mL  3.375 g IntraVENous Q8H    Vancomycin ---Pharmacy to Dose   Other Rx Dosing/Monitoring          LAB AND IMAGING FINDINGS:     Lab Results   Component Value Date/Time    WBC 16.3 07/06/2017 04:20 AM    HGB 7.4 07/06/2017 04:20 AM    PLATELET 589 10/75/9264 04:20 AM     Lab Results   Component Value Date/Time    Sodium 142 07/06/2017 04:20 AM    Potassium 3.8 07/06/2017 04:20 AM    Chloride 109 07/06/2017 04:20 AM    CO2 26 07/06/2017 04:20 AM    BUN 28 07/06/2017 04:20 AM    Creatinine 1.66 07/06/2017 04:20 AM    Calcium 9.0 07/06/2017 04:20 AM    Magnesium 2.1 07/06/2017 04:20 AM    Phosphorus 4.9 07/06/2017 04:20 AM      Lab Results   Component Value Date/Time    AST (SGOT) 12 07/05/2017 03:08 AM    Alk.  phosphatase 99 07/05/2017 03:08 AM    Protein, total 6.8 07/05/2017 03:08 AM    Albumin 1.6 07/05/2017 03:08 AM    Globulin 5.2 07/05/2017 03:08 AM     No results found for: INR, PTMR, PTP, PT1, PT2, APTT   No results found for: IRON, FE, TIBC, IBCT, PSAT, FERR   No results found for: PH, PCO2, PO2  No components found for: GLPOC   No results found for: CPK, CKMB             Total time:   Counseling / coordination time, spent as noted above:    > 50% counseling / coordination?:     Prolonged service was provided for  []30 min   []75 min in face to face time in the presence of the patient, spent as noted above. Time Start:   Time End:   Note: this can only be billed with 76499 (initial) or 86420 (follow up). If multiple start / stop times, list each separately.

## 2017-07-06 NOTE — PROGRESS NOTES
NUTRITION COMPLETE ASSESSMENT    RECOMMENDATIONS:   1. Increase volume to ~2200 mL/day and add lipids to better meet estimated needs (currently, TPN meeting 70% of estimated kcal needs). Would also cycle over 14 hours to help prevent cholestasis. GOAL TPN:   5%AA D20 @ 85 mL/hr x 1 hour                        @ 170 mL/hr x 12 hrs                        @ 85 mL/hr x last hour  + 20% lipids, 500 mL 3x/week  + Check BG 2 hours into infusion and 1 hour after infusion is completed    2. Daily weights     Interventions/Plan:   Food/Nutrient Delivery:  TPN as sole source of nutrition     Assessment:   Reason for Assessment:   [x] Reassessment    Diet: NPO  Nutritionally Significant Medications: [x] Reviewed & Includes: novolin R, humulin R correction scale; sandostatin TID; zofran q 6 hours prn; protonix daily; zosyn q 8 hours; compazine q6 hours prn; vancomycin     TPN: 5%AA D20 @ 83 mL/hr + MVI, thiamine (100 mg), trace element    Subjective:  Pt with NGT removed, taking ice chips. Objective:  Chart reviewed, discussed with RN and team during interdisciplinary rounds this morning. Pt on TPN as sole source of nutrition secondary to [facility] fistula and plan is for surgical closure tomorrow. Palliative Care following for chronic pain and Crohn's. NGT removed (output on 7/3 was 1400 mL). TPN as above is providing 1755 kcal, 100 g protein in 1992 mL-- meeting 70% and 91% of estimated kcal and protein needs, respectively. Suspect she will require long term TPN, even after surgery, so would recommend cycling over 14 hours and adding lipids to better meet estimated needs. Above goal recommendations to provide a daily average of 2374 kcal, 111 g protein in 2210 mL-- meeting 94% and 100% of estimated kcal and protein needs, respectively. Estimated Nutrition Needs:   Kcals/day: 7848 Kcals/day (1952-1389 kcal/day [facility]. Jeor x 1.2-1.3))  Protein: 110 g (2.0g/kg IBW)  Fluid:  (1  ml/kcal)     Based On:  [address]  Weight Used: Actual wt (143.4 kg (wt on admit))    Pt expected to meet estimated nutrient needs:  [x]   Yes (with goal TPN)    Nutrition Diagnosis:   1. Inadequate oral food/beverage intake related to unresectable ischemic bowel  as evidenced by NPO with plan for parenteral nutrition support. Goals:     TPN to meet at least 90% estimated needs in next 1-2 days. Monitoring & Evaluation:    - Enteral/parenteral nutrition intake   - Electrolyte and renal profile, Weight/weight change, GI profile      Previous Nutrition Goals Met:  Progressing  Previous Recommendations:      Progressing    Education & Discharge Needs:   [x] None Identified   [] Identified and addressed    [x] Participated in care plan, discharge planning, and/or interdisciplinary rounds        Cultural, Scientology and ethnic food preferences identified:   None    Skin Integrity: []Intact  [x]Other: wound vac  Edema: []None [x]Other: 1+ pitting (LLE, RLE)  Last BM: 6/26/17  Food Allergies: [x]None []Other    Anthropometrics:    Weight Loss Metrics 7/6/2017 6/25/2017 6/25/2017 6/25/2017 6/22/2017 6/12/2017 6/6/2017   Today's Wt 313 lb 15 oz - 343 lb - 343 lb 343 lb 14.7 oz -   BMI - 53.89 kg/m2 - 58.88 kg/m2 58.88 kg/m2 - 59.03 kg/m2      Last 3 Recorded Weights in this Encounter    07/05/17 0411 07/05/17 2229 07/06/17 0009   Weight: 146.9 kg (323 lb 13.7 oz) 142.4 kg (313 lb 15 oz) 142.4 kg (313 lb 15 oz)      Weight Source: Bed  Height: 5' 4\" (162.6 cm),    Body mass index is 53.89 kg/(m^2).   IBW : 54.4 kg (120 lb), % IBW (Calculated): 263.45 %   ,      Labs:    Lab Results   Component Value Date/Time    Sodium 142 07/06/2017 04:20 AM    Potassium 3.8 07/06/2017 04:20 AM    Chloride 109 07/06/2017 04:20 AM    CO2 26 07/06/2017 04:20 AM    Glucose 115 07/06/2017 04:20 AM    BUN 28 07/06/2017 04:20 AM    Creatinine 1.66 07/06/2017 04:20 AM    Calcium 9.0 07/06/2017 04:20 AM    Magnesium 2.1 07/06/2017 04:20 AM    Phosphorus 4.9 07/06/2017 04:20 AM    Albumin 1.6 07/05/2017 03:08 AM     No results found for: HBA1C, HGBE8, WFG6IICL, DBV7QFLP  Lab Results   Component Value Date/Time    Glucose 115 07/06/2017 04:20 AM    Glucose (POC) 143 07/06/2017 12:02 PM      Lab Results   Component Value Date/Time    ALT (SGPT) 14 07/05/2017 03:08 AM    AST (SGOT) 12 07/05/2017 03:08 AM    Alk.  phosphatase 99 07/05/2017 03:08 AM    Bilirubin, direct 0.1 07/07/2009 06:38 PM    Bilirubin, total 0.7 07/05/2017 03:08 AM      [address]

## 2017-07-06 NOTE — WOUND CARE
Paged by RN for leaking VAC. Incisional VAC applied yesterday to lower portion of midline abdominal incision with staples that was leaking a large amount of green effluent. ~300cc in cannister - new cannister placed. Lower portion of sponge was clogged with mucus. Removed and reapplied incisional VAC using 1 black sponge. Fistula pouch to upper incision remains intact and to wall suction. For surgery for fistula repair tomorrow. Will continue to follow.   Manny Gloria, [facility]

## 2017-07-07 ENCOUNTER — ANESTHESIA EVENT (OUTPATIENT)
Dept: SURGERY | Age: 42
DRG: 335 | End: 2017-07-07
Payer: MEDICARE

## 2017-07-07 ENCOUNTER — APPOINTMENT (OUTPATIENT)
Dept: GENERAL RADIOLOGY | Age: 42
DRG: 335 | End: 2017-07-07
Attending: SURGERY
Payer: MEDICARE

## 2017-07-07 ENCOUNTER — ANESTHESIA (OUTPATIENT)
Dept: SURGERY | Age: 42
DRG: 335 | End: 2017-07-07
Payer: MEDICARE

## 2017-07-07 LAB
ANION GAP BLD CALC-SCNC: 8 MMOL/L (ref 5–15)
BUN SERPL-MCNC: 31 MG/DL (ref 6–20)
BUN/CREAT SERPL: 20 (ref 12–20)
CALCIUM SERPL-MCNC: 8.8 MG/DL (ref 8.5–10.1)
CHLORIDE SERPL-SCNC: 111 MMOL/L (ref 97–108)
CO2 SERPL-SCNC: 22 MMOL/L (ref 21–32)
CREAT SERPL-MCNC: 1.54 MG/DL (ref 0.55–1.02)
GLUCOSE BLD STRIP.AUTO-MCNC: 112 MG/DL (ref 65–100)
GLUCOSE BLD STRIP.AUTO-MCNC: 93 MG/DL (ref 65–100)
GLUCOSE SERPL-MCNC: 99 MG/DL (ref 65–100)
MAGNESIUM SERPL-MCNC: 2.2 MG/DL (ref 1.6–2.4)
PHOSPHATE SERPL-MCNC: 3.3 MG/DL (ref 2.6–4.7)
POTASSIUM SERPL-SCNC: 4.4 MMOL/L (ref 3.5–5.1)
SERVICE CMNT-IMP: ABNORMAL
SERVICE CMNT-IMP: NORMAL
SODIUM SERPL-SCNC: 141 MMOL/L (ref 136–145)

## 2017-07-07 PROCEDURE — 76210000009 HC REC RM PH I 6.5 TO 7 HR: Performed by: SURGERY

## 2017-07-07 PROCEDURE — 77030013533 HC SEAL FBRN TISSL RDYTUSE 10ML BAXT -E: Performed by: SURGERY

## 2017-07-07 PROCEDURE — 77030032435: Performed by: SURGERY

## 2017-07-07 PROCEDURE — 74011250636 HC RX REV CODE- 250/636: Performed by: ANESTHESIOLOGY

## 2017-07-07 PROCEDURE — P9016 RBC LEUKOCYTES REDUCED: HCPCS | Performed by: SURGERY

## 2017-07-07 PROCEDURE — 74011250636 HC RX REV CODE- 250/636

## 2017-07-07 PROCEDURE — 74011000258 HC RX REV CODE- 258: Performed by: SURGERY

## 2017-07-07 PROCEDURE — 74011250636 HC RX REV CODE- 250/636: Performed by: NURSE PRACTITIONER

## 2017-07-07 PROCEDURE — 74011000250 HC RX REV CODE- 250: Performed by: SURGERY

## 2017-07-07 PROCEDURE — 77030010541: Performed by: SURGERY

## 2017-07-07 PROCEDURE — 65610000006 HC RM INTENSIVE CARE

## 2017-07-07 PROCEDURE — C1729 CATH, DRAINAGE: HCPCS | Performed by: SURGERY

## 2017-07-07 PROCEDURE — 74011250636 HC RX REV CODE- 250/636: Performed by: PHYSICAL MEDICINE & REHABILITATION

## 2017-07-07 PROCEDURE — 3331090002 HH PPS REVENUE DEBIT

## 2017-07-07 PROCEDURE — 74011636637 HC RX REV CODE- 636/637: Performed by: SURGERY

## 2017-07-07 PROCEDURE — 36415 COLL VENOUS BLD VENIPUNCTURE: CPT | Performed by: SURGERY

## 2017-07-07 PROCEDURE — 74000 XR ABD (KUB): CPT

## 2017-07-07 PROCEDURE — 86900 BLOOD TYPING SEROLOGIC ABO: CPT | Performed by: SURGERY

## 2017-07-07 PROCEDURE — 84100 ASSAY OF PHOSPHORUS: CPT | Performed by: SURGERY

## 2017-07-07 PROCEDURE — 80048 BASIC METABOLIC PNL TOTAL CA: CPT | Performed by: SURGERY

## 2017-07-07 PROCEDURE — 74011250636 HC RX REV CODE- 250/636: Performed by: SURGERY

## 2017-07-07 PROCEDURE — 74011000250 HC RX REV CODE- 250

## 2017-07-07 PROCEDURE — 86923 COMPATIBILITY TEST ELECTRIC: CPT | Performed by: SURGERY

## 2017-07-07 PROCEDURE — 77030002916 HC SUT ETHLN J&J -A: Performed by: SURGERY

## 2017-07-07 PROCEDURE — 74011250636 HC RX REV CODE- 250/636: Performed by: INTERNAL MEDICINE

## 2017-07-07 PROCEDURE — 77030018836 HC SOL IRR NACL ICUM -A: Performed by: SURGERY

## 2017-07-07 PROCEDURE — 77030002986 HC SUT PROL J&J -A: Performed by: SURGERY

## 2017-07-07 PROCEDURE — 82962 GLUCOSE BLOOD TEST: CPT

## 2017-07-07 PROCEDURE — 76060000038 HC ANESTHESIA 3.5 TO 4 HR: Performed by: SURGERY

## 2017-07-07 PROCEDURE — 3331090001 HH PPS REVENUE CREDIT

## 2017-07-07 PROCEDURE — 77030011236: Performed by: SURGERY

## 2017-07-07 PROCEDURE — 77030034850: Performed by: SURGERY

## 2017-07-07 PROCEDURE — 74011000250 HC RX REV CODE- 250: Performed by: NURSE PRACTITIONER

## 2017-07-07 PROCEDURE — 30233R1 TRANSFUSION OF NONAUTOLOGOUS PLATELETS INTO PERIPHERAL VEIN, PERCUTANEOUS APPROACH: ICD-10-PCS | Performed by: SURGERY

## 2017-07-07 PROCEDURE — 83735 ASSAY OF MAGNESIUM: CPT | Performed by: SURGERY

## 2017-07-07 PROCEDURE — 76010000134 HC OR TIME 3.5 TO 4 HR: Performed by: SURGERY

## 2017-07-07 PROCEDURE — 30233N1 TRANSFUSION OF NONAUTOLOGOUS RED BLOOD CELLS INTO PERIPHERAL VEIN, PERCUTANEOUS APPROACH: ICD-10-PCS | Performed by: SURGERY

## 2017-07-07 PROCEDURE — 74011000258 HC RX REV CODE- 258: Performed by: INTERNAL MEDICINE

## 2017-07-07 PROCEDURE — 0DN80ZZ RELEASE SMALL INTESTINE, OPEN APPROACH: ICD-10-PCS | Performed by: SURGERY

## 2017-07-07 PROCEDURE — 77030018717 HC DRSG GRNUFM KCON -B: Performed by: SURGERY

## 2017-07-07 PROCEDURE — 77030008467 HC STPLR SKN COVD -B: Performed by: SURGERY

## 2017-07-07 PROCEDURE — 77030010545: Performed by: SURGERY

## 2017-07-07 PROCEDURE — 77030033269 HC SLV COMPR SCD KNE2 CARD -B: Performed by: SURGERY

## 2017-07-07 PROCEDURE — 77030002996 HC SUT SLK J&J -A: Performed by: SURGERY

## 2017-07-07 PROCEDURE — 77030011640 HC PAD GRND REM COVD -A: Performed by: SURGERY

## 2017-07-07 PROCEDURE — 0DHB0UZ INSERTION OF FEEDING DEVICE INTO ILEUM, OPEN APPROACH: ICD-10-PCS | Performed by: SURGERY

## 2017-07-07 PROCEDURE — 77030002966 HC SUT PDS J&J -A: Performed by: SURGERY

## 2017-07-07 PROCEDURE — 0D9A00Z DRAINAGE OF JEJUNUM WITH DRAINAGE DEVICE, OPEN APPROACH: ICD-10-PCS | Performed by: SURGERY

## 2017-07-07 PROCEDURE — P9035 PLATELET PHERES LEUKOREDUCED: HCPCS | Performed by: SURGERY

## 2017-07-07 PROCEDURE — 77030011264 HC ELECTRD BLD EXT COVD -A: Performed by: SURGERY

## 2017-07-07 PROCEDURE — 77030031139 HC SUT VCRL2 J&J -A: Performed by: SURGERY

## 2017-07-07 RX ORDER — ROPIVACAINE HYDROCHLORIDE 5 MG/ML
30 INJECTION, SOLUTION EPIDURAL; INFILTRATION; PERINEURAL ONCE
Status: DISCONTINUED | OUTPATIENT
Start: 2017-07-07 | End: 2017-07-07 | Stop reason: HOSPADM

## 2017-07-07 RX ORDER — HYDROMORPHONE HYDROCHLORIDE 1 MG/ML
INJECTION, SOLUTION INTRAMUSCULAR; INTRAVENOUS; SUBCUTANEOUS
Status: DISPENSED
Start: 2017-07-07 | End: 2017-07-08

## 2017-07-07 RX ORDER — SODIUM CHLORIDE, SODIUM LACTATE, POTASSIUM CHLORIDE, CALCIUM CHLORIDE 600; 310; 30; 20 MG/100ML; MG/100ML; MG/100ML; MG/100ML
75 INJECTION, SOLUTION INTRAVENOUS CONTINUOUS
Status: DISCONTINUED | OUTPATIENT
Start: 2017-07-07 | End: 2017-07-07 | Stop reason: HOSPADM

## 2017-07-07 RX ORDER — DIPHENHYDRAMINE HYDROCHLORIDE 50 MG/ML
12.5 INJECTION, SOLUTION INTRAMUSCULAR; INTRAVENOUS AS NEEDED
Status: DISCONTINUED | OUTPATIENT
Start: 2017-07-07 | End: 2017-07-07 | Stop reason: HOSPADM

## 2017-07-07 RX ORDER — GLYCOPYRROLATE 0.2 MG/ML
INJECTION INTRAMUSCULAR; INTRAVENOUS AS NEEDED
Status: DISCONTINUED | OUTPATIENT
Start: 2017-07-07 | End: 2017-07-07 | Stop reason: HOSPADM

## 2017-07-07 RX ORDER — FENTANYL CITRATE 50 UG/ML
25 INJECTION, SOLUTION INTRAMUSCULAR; INTRAVENOUS
Status: COMPLETED | OUTPATIENT
Start: 2017-07-07 | End: 2017-07-07

## 2017-07-07 RX ORDER — PROPOFOL 10 MG/ML
INJECTION, EMULSION INTRAVENOUS
Status: DISCONTINUED | OUTPATIENT
Start: 2017-07-07 | End: 2017-07-07 | Stop reason: HOSPADM

## 2017-07-07 RX ORDER — ROCURONIUM BROMIDE 10 MG/ML
INJECTION, SOLUTION INTRAVENOUS AS NEEDED
Status: DISCONTINUED | OUTPATIENT
Start: 2017-07-07 | End: 2017-07-07 | Stop reason: HOSPADM

## 2017-07-07 RX ORDER — ONDANSETRON 2 MG/ML
INJECTION INTRAMUSCULAR; INTRAVENOUS AS NEEDED
Status: DISCONTINUED | OUTPATIENT
Start: 2017-07-07 | End: 2017-07-07 | Stop reason: HOSPADM

## 2017-07-07 RX ORDER — SODIUM CHLORIDE 9 MG/ML
INJECTION, SOLUTION INTRAVENOUS
Status: DISCONTINUED | OUTPATIENT
Start: 2017-07-07 | End: 2017-07-07 | Stop reason: HOSPADM

## 2017-07-07 RX ORDER — SODIUM CHLORIDE 9 MG/ML
250 INJECTION, SOLUTION INTRAVENOUS AS NEEDED
Status: DISCONTINUED | OUTPATIENT
Start: 2017-07-07 | End: 2017-07-07 | Stop reason: HOSPADM

## 2017-07-07 RX ORDER — PROPOFOL 10 MG/ML
INJECTION, EMULSION INTRAVENOUS AS NEEDED
Status: DISCONTINUED | OUTPATIENT
Start: 2017-07-07 | End: 2017-07-07 | Stop reason: HOSPADM

## 2017-07-07 RX ORDER — HYDROMORPHONE HYDROCHLORIDE 2 MG/1
0.5 TABLET ORAL
Status: ACTIVE | OUTPATIENT
Start: 2017-07-07 | End: 2017-07-07

## 2017-07-07 RX ORDER — SUCCINYLCHOLINE CHLORIDE 20 MG/ML
INJECTION INTRAMUSCULAR; INTRAVENOUS AS NEEDED
Status: DISCONTINUED | OUTPATIENT
Start: 2017-07-07 | End: 2017-07-07 | Stop reason: HOSPADM

## 2017-07-07 RX ORDER — MIDAZOLAM HYDROCHLORIDE 1 MG/ML
0.5 INJECTION, SOLUTION INTRAMUSCULAR; INTRAVENOUS
Status: DISCONTINUED | OUTPATIENT
Start: 2017-07-07 | End: 2017-07-07 | Stop reason: HOSPADM

## 2017-07-07 RX ORDER — NEOSTIGMINE METHYLSULFATE 1 MG/ML
INJECTION INTRAVENOUS AS NEEDED
Status: DISCONTINUED | OUTPATIENT
Start: 2017-07-07 | End: 2017-07-07 | Stop reason: HOSPADM

## 2017-07-07 RX ORDER — FENTANYL CITRATE 50 UG/ML
INJECTION, SOLUTION INTRAMUSCULAR; INTRAVENOUS
Status: DISPENSED
Start: 2017-07-07 | End: 2017-07-08

## 2017-07-07 RX ORDER — HYDROMORPHONE HYDROCHLORIDE 1 MG/ML
0.2 INJECTION, SOLUTION INTRAMUSCULAR; INTRAVENOUS; SUBCUTANEOUS
Status: DISCONTINUED | OUTPATIENT
Start: 2017-07-07 | End: 2017-07-07 | Stop reason: HOSPADM

## 2017-07-07 RX ORDER — HYDROMORPHONE HYDROCHLORIDE 1 MG/ML
INJECTION, SOLUTION INTRAMUSCULAR; INTRAVENOUS; SUBCUTANEOUS AS NEEDED
Status: DISCONTINUED | OUTPATIENT
Start: 2017-07-07 | End: 2017-07-07 | Stop reason: HOSPADM

## 2017-07-07 RX ORDER — SODIUM CHLORIDE 9 MG/ML
250 INJECTION, SOLUTION INTRAVENOUS AS NEEDED
Status: DISCONTINUED | OUTPATIENT
Start: 2017-07-07 | End: 2017-08-24 | Stop reason: HOSPADM

## 2017-07-07 RX ORDER — SODIUM CHLORIDE 0.9 % (FLUSH) 0.9 %
5-10 SYRINGE (ML) INJECTION AS NEEDED
Status: DISCONTINUED | OUTPATIENT
Start: 2017-07-07 | End: 2017-07-07 | Stop reason: HOSPADM

## 2017-07-07 RX ORDER — LORAZEPAM 2 MG/ML
2 INJECTION INTRAMUSCULAR ONCE
Status: COMPLETED | OUTPATIENT
Start: 2017-07-07 | End: 2017-07-07

## 2017-07-07 RX ORDER — FENTANYL CITRATE 50 UG/ML
INJECTION, SOLUTION INTRAMUSCULAR; INTRAVENOUS AS NEEDED
Status: DISCONTINUED | OUTPATIENT
Start: 2017-07-07 | End: 2017-07-07 | Stop reason: HOSPADM

## 2017-07-07 RX ORDER — FENTANYL CITRATE 50 UG/ML
50 INJECTION, SOLUTION INTRAMUSCULAR; INTRAVENOUS
Status: DISCONTINUED | OUTPATIENT
Start: 2017-07-07 | End: 2017-07-07 | Stop reason: HOSPADM

## 2017-07-07 RX ORDER — MIDAZOLAM HYDROCHLORIDE 1 MG/ML
1 INJECTION, SOLUTION INTRAMUSCULAR; INTRAVENOUS AS NEEDED
Status: DISCONTINUED | OUTPATIENT
Start: 2017-07-07 | End: 2017-07-07 | Stop reason: HOSPADM

## 2017-07-07 RX ORDER — SODIUM CHLORIDE 9 MG/ML
25 INJECTION, SOLUTION INTRAVENOUS CONTINUOUS
Status: DISCONTINUED | OUTPATIENT
Start: 2017-07-07 | End: 2017-07-07 | Stop reason: HOSPADM

## 2017-07-07 RX ORDER — FENTANYL CITRATE 50 UG/ML
50 INJECTION, SOLUTION INTRAMUSCULAR; INTRAVENOUS AS NEEDED
Status: DISCONTINUED | OUTPATIENT
Start: 2017-07-07 | End: 2017-07-07 | Stop reason: HOSPADM

## 2017-07-07 RX ORDER — MORPHINE SULFATE 10 MG/ML
2 INJECTION, SOLUTION INTRAMUSCULAR; INTRAVENOUS
Status: DISCONTINUED | OUTPATIENT
Start: 2017-07-07 | End: 2017-07-07 | Stop reason: HOSPADM

## 2017-07-07 RX ORDER — SODIUM CHLORIDE, SODIUM LACTATE, POTASSIUM CHLORIDE, CALCIUM CHLORIDE 600; 310; 30; 20 MG/100ML; MG/100ML; MG/100ML; MG/100ML
125 INJECTION, SOLUTION INTRAVENOUS CONTINUOUS
Status: DISCONTINUED | OUTPATIENT
Start: 2017-07-07 | End: 2017-07-07 | Stop reason: HOSPADM

## 2017-07-07 RX ORDER — LORAZEPAM 2 MG/ML
INJECTION INTRAMUSCULAR
Status: DISPENSED
Start: 2017-07-07 | End: 2017-07-08

## 2017-07-07 RX ORDER — LIDOCAINE HYDROCHLORIDE 10 MG/ML
0.1 INJECTION, SOLUTION EPIDURAL; INFILTRATION; INTRACAUDAL; PERINEURAL AS NEEDED
Status: DISCONTINUED | OUTPATIENT
Start: 2017-07-07 | End: 2017-07-07 | Stop reason: HOSPADM

## 2017-07-07 RX ORDER — DEXAMETHASONE SODIUM PHOSPHATE 4 MG/ML
INJECTION, SOLUTION INTRA-ARTICULAR; INTRALESIONAL; INTRAMUSCULAR; INTRAVENOUS; SOFT TISSUE AS NEEDED
Status: DISCONTINUED | OUTPATIENT
Start: 2017-07-07 | End: 2017-07-07 | Stop reason: HOSPADM

## 2017-07-07 RX ORDER — ONDANSETRON 2 MG/ML
4 INJECTION INTRAMUSCULAR; INTRAVENOUS AS NEEDED
Status: DISCONTINUED | OUTPATIENT
Start: 2017-07-07 | End: 2017-07-07 | Stop reason: HOSPADM

## 2017-07-07 RX ORDER — PROPOFOL 10 MG/ML
5-50 VIAL (ML) INTRAVENOUS
Status: DISCONTINUED | OUTPATIENT
Start: 2017-07-07 | End: 2017-07-07 | Stop reason: HOSPADM

## 2017-07-07 RX ORDER — LIDOCAINE HYDROCHLORIDE 20 MG/ML
INJECTION, SOLUTION EPIDURAL; INFILTRATION; INTRACAUDAL; PERINEURAL AS NEEDED
Status: DISCONTINUED | OUTPATIENT
Start: 2017-07-07 | End: 2017-07-07 | Stop reason: HOSPADM

## 2017-07-07 RX ORDER — SODIUM CHLORIDE 0.9 % (FLUSH) 0.9 %
5-10 SYRINGE (ML) INJECTION EVERY 8 HOURS
Status: DISCONTINUED | OUTPATIENT
Start: 2017-07-07 | End: 2017-07-07 | Stop reason: HOSPADM

## 2017-07-07 RX ORDER — SODIUM CHLORIDE, SODIUM LACTATE, POTASSIUM CHLORIDE, CALCIUM CHLORIDE 600; 310; 30; 20 MG/100ML; MG/100ML; MG/100ML; MG/100ML
25 INJECTION, SOLUTION INTRAVENOUS CONTINUOUS
Status: DISCONTINUED | OUTPATIENT
Start: 2017-07-07 | End: 2017-08-24 | Stop reason: HOSPADM

## 2017-07-07 RX ADMIN — Medication 10 ML: at 21:41

## 2017-07-07 RX ADMIN — FENTANYL CITRATE 75 MCG: 50 INJECTION, SOLUTION INTRAMUSCULAR; INTRAVENOUS at 10:05

## 2017-07-07 RX ADMIN — OCTREOTIDE ACETATE 50 MCG: 50 INJECTION, SOLUTION INTRAVENOUS; SUBCUTANEOUS at 23:19

## 2017-07-07 RX ADMIN — HYDROMORPHONE HYDROCHLORIDE 0.3 MG: 1 INJECTION, SOLUTION INTRAMUSCULAR; INTRAVENOUS; SUBCUTANEOUS at 15:36

## 2017-07-07 RX ADMIN — HUMAN INSULIN 2 UNITS: 100 INJECTION, SOLUTION SUBCUTANEOUS at 00:07

## 2017-07-07 RX ADMIN — HYDROMORPHONE HYDROCHLORIDE 1 MG: 1 INJECTION, SOLUTION INTRAMUSCULAR; INTRAVENOUS; SUBCUTANEOUS at 00:14

## 2017-07-07 RX ADMIN — CALCIUM GLUCONATE: 94 INJECTION, SOLUTION INTRAVENOUS at 19:11

## 2017-07-07 RX ADMIN — FENTANYL CITRATE 25 MCG: 50 INJECTION, SOLUTION INTRAMUSCULAR; INTRAVENOUS at 14:40

## 2017-07-07 RX ADMIN — ROCURONIUM BROMIDE 10 MG: 10 INJECTION, SOLUTION INTRAVENOUS at 10:14

## 2017-07-07 RX ADMIN — Medication: at 20:35

## 2017-07-07 RX ADMIN — FENTANYL CITRATE 50 MCG: 50 INJECTION, SOLUTION INTRAMUSCULAR; INTRAVENOUS at 16:44

## 2017-07-07 RX ADMIN — HYDROMORPHONE HYDROCHLORIDE 1 MG: 1 INJECTION, SOLUTION INTRAMUSCULAR; INTRAVENOUS; SUBCUTANEOUS at 21:35

## 2017-07-07 RX ADMIN — FENTANYL CITRATE 50 MCG: 50 INJECTION, SOLUTION INTRAMUSCULAR; INTRAVENOUS at 17:12

## 2017-07-07 RX ADMIN — HYDROMORPHONE HYDROCHLORIDE 1 MG: 1 INJECTION, SOLUTION INTRAMUSCULAR; INTRAVENOUS; SUBCUTANEOUS at 07:58

## 2017-07-07 RX ADMIN — PIPERACILLIN SODIUM,TAZOBACTAM SODIUM 3.38 G: 3; .375 INJECTION, POWDER, FOR SOLUTION INTRAVENOUS at 05:14

## 2017-07-07 RX ADMIN — GLYCOPYRROLATE 0.4 MG: 0.2 INJECTION INTRAMUSCULAR; INTRAVENOUS at 12:30

## 2017-07-07 RX ADMIN — ROCURONIUM BROMIDE 20 MG: 10 INJECTION, SOLUTION INTRAVENOUS at 09:49

## 2017-07-07 RX ADMIN — MORPHINE SULFATE 5 MG: 10 INJECTION, SOLUTION INTRAVENOUS at 16:35

## 2017-07-07 RX ADMIN — FENTANYL CITRATE 75 MCG: 50 INJECTION, SOLUTION INTRAMUSCULAR; INTRAVENOUS at 11:41

## 2017-07-07 RX ADMIN — Medication: at 05:24

## 2017-07-07 RX ADMIN — ROCURONIUM BROMIDE 40 MG: 10 INJECTION, SOLUTION INTRAVENOUS at 09:19

## 2017-07-07 RX ADMIN — FENTANYL CITRATE 25 MCG: 50 INJECTION, SOLUTION INTRAMUSCULAR; INTRAVENOUS at 14:45

## 2017-07-07 RX ADMIN — HYDROMORPHONE HYDROCHLORIDE 0.2 MG: 1 INJECTION, SOLUTION INTRAMUSCULAR; INTRAVENOUS; SUBCUTANEOUS at 15:15

## 2017-07-07 RX ADMIN — PIPERACILLIN SODIUM,TAZOBACTAM SODIUM 3.38 G: 3; .375 INJECTION, POWDER, FOR SOLUTION INTRAVENOUS at 09:17

## 2017-07-07 RX ADMIN — FENTANYL CITRATE 50 MCG: 50 INJECTION, SOLUTION INTRAMUSCULAR; INTRAVENOUS at 09:13

## 2017-07-07 RX ADMIN — ROCURONIUM BROMIDE 10 MG: 10 INJECTION, SOLUTION INTRAVENOUS at 09:13

## 2017-07-07 RX ADMIN — PROPOFOL 50 MCG/KG/MIN: 10 INJECTION, EMULSION INTRAVENOUS at 12:00

## 2017-07-07 RX ADMIN — SODIUM CHLORIDE, SODIUM LACTATE, POTASSIUM CHLORIDE, AND CALCIUM CHLORIDE 100 ML/HR: 600; 310; 30; 20 INJECTION, SOLUTION INTRAVENOUS at 14:47

## 2017-07-07 RX ADMIN — VANCOMYCIN HYDROCHLORIDE 1750 MG: 10 INJECTION, POWDER, LYOPHILIZED, FOR SOLUTION INTRAVENOUS at 03:57

## 2017-07-07 RX ADMIN — SODIUM CHLORIDE: 9 INJECTION, SOLUTION INTRAVENOUS at 11:50

## 2017-07-07 RX ADMIN — MIDAZOLAM HYDROCHLORIDE 1 MG: 1 INJECTION, SOLUTION INTRAMUSCULAR; INTRAVENOUS at 15:34

## 2017-07-07 RX ADMIN — ROCURONIUM BROMIDE 10 MG: 10 INJECTION, SOLUTION INTRAVENOUS at 11:03

## 2017-07-07 RX ADMIN — LIDOCAINE HYDROCHLORIDE 60 MG: 20 INJECTION, SOLUTION EPIDURAL; INFILTRATION; INTRACAUDAL; PERINEURAL at 09:13

## 2017-07-07 RX ADMIN — HYDROMORPHONE HYDROCHLORIDE 1 MG: 1 INJECTION, SOLUTION INTRAMUSCULAR; INTRAVENOUS; SUBCUTANEOUS at 23:20

## 2017-07-07 RX ADMIN — SUCCINYLCHOLINE CHLORIDE 160 MG: 20 INJECTION INTRAMUSCULAR; INTRAVENOUS at 09:49

## 2017-07-07 RX ADMIN — LORAZEPAM 2 MG: 2 INJECTION INTRAMUSCULAR; INTRAVENOUS at 15:45

## 2017-07-07 RX ADMIN — DIPHENHYDRAMINE HYDROCHLORIDE 12.5 MG: 50 INJECTION, SOLUTION INTRAMUSCULAR; INTRAVENOUS at 00:14

## 2017-07-07 RX ADMIN — FENTANYL CITRATE 25 MCG: 50 INJECTION, SOLUTION INTRAMUSCULAR; INTRAVENOUS at 14:22

## 2017-07-07 RX ADMIN — HYDROMORPHONE HYDROCHLORIDE 1 MG: 1 INJECTION, SOLUTION INTRAMUSCULAR; INTRAVENOUS; SUBCUTANEOUS at 04:37

## 2017-07-07 RX ADMIN — I.V. FAT EMULSION 500 ML: 20 EMULSION INTRAVENOUS at 19:13

## 2017-07-07 RX ADMIN — ONDANSETRON 8 MG: 2 INJECTION INTRAMUSCULAR; INTRAVENOUS at 00:14

## 2017-07-07 RX ADMIN — HYDROMORPHONE HYDROCHLORIDE 0.2 MG: 1 INJECTION, SOLUTION INTRAMUSCULAR; INTRAVENOUS; SUBCUTANEOUS at 11:38

## 2017-07-07 RX ADMIN — ENOXAPARIN SODIUM 40 MG: 40 INJECTION SUBCUTANEOUS at 23:25

## 2017-07-07 RX ADMIN — FENTANYL CITRATE 25 MCG: 50 INJECTION, SOLUTION INTRAMUSCULAR; INTRAVENOUS at 14:30

## 2017-07-07 RX ADMIN — SODIUM CHLORIDE 100 MG: 900 INJECTION, SOLUTION INTRAVENOUS at 18:14

## 2017-07-07 RX ADMIN — FENTANYL CITRATE 50 MCG: 50 INJECTION, SOLUTION INTRAMUSCULAR; INTRAVENOUS at 09:08

## 2017-07-07 RX ADMIN — HYDROMORPHONE HYDROCHLORIDE 0.2 MG: 1 INJECTION, SOLUTION INTRAMUSCULAR; INTRAVENOUS; SUBCUTANEOUS at 10:58

## 2017-07-07 RX ADMIN — Medication 1 MG: at 21:36

## 2017-07-07 RX ADMIN — PIPERACILLIN SODIUM,TAZOBACTAM SODIUM 3.38 G: 3; .375 INJECTION, POWDER, FOR SOLUTION INTRAVENOUS at 21:39

## 2017-07-07 RX ADMIN — HYDROMORPHONE HYDROCHLORIDE 0.6 MG: 1 INJECTION, SOLUTION INTRAMUSCULAR; INTRAVENOUS; SUBCUTANEOUS at 12:00

## 2017-07-07 RX ADMIN — PROPOFOL 170 MG: 10 INJECTION, EMULSION INTRAVENOUS at 09:13

## 2017-07-07 RX ADMIN — NEOSTIGMINE METHYLSULFATE 3 MG: 1 INJECTION INTRAVENOUS at 12:30

## 2017-07-07 RX ADMIN — DEXAMETHASONE SODIUM PHOSPHATE 4 MG: 4 INJECTION, SOLUTION INTRA-ARTICULAR; INTRALESIONAL; INTRAMUSCULAR; INTRAVENOUS; SOFT TISSUE at 09:15

## 2017-07-07 RX ADMIN — MORPHINE SULFATE 5 MG: 10 INJECTION, SOLUTION INTRAVENOUS at 18:08

## 2017-07-07 RX ADMIN — ONDANSETRON 4 MG: 2 INJECTION INTRAMUSCULAR; INTRAVENOUS at 09:15

## 2017-07-07 RX ADMIN — SODIUM CHLORIDE: 9 INJECTION, SOLUTION INTRAVENOUS at 09:44

## 2017-07-07 RX ADMIN — Medication 1 MG: at 06:00

## 2017-07-07 RX ADMIN — HYDROMORPHONE HYDROCHLORIDE 0.5 MG: 1 INJECTION, SOLUTION INTRAMUSCULAR; INTRAVENOUS; SUBCUTANEOUS at 14:56

## 2017-07-07 NOTE — PROGRESS NOTES
Patient off floor all day. Please call on call palliative medicine at [phone] for uncontrolled pain.

## 2017-07-07 NOTE — PROGRESS NOTES
Bedside shift change report given to MAURI Espino (oncoming nurse) by Junior Rachael RN (offgoing nurse). Report included the following information SBAR, Kardex, Intake/Output, MAR, Recent Results and Cardiac Rhythm sinus tach.       Gladis Mijares RN  8:13 AM

## 2017-07-07 NOTE — ROUTINE PROCESS
Patient: Ean Chung MRN: [id number]  SSN: [id number]   YOB: 1975  Age: 46 y.o. Sex: female     Patient is status post Procedure(s):  EXPLORATORY LAPAROTOMY WITH LYSIS OF ADHESIONS FOR 2 HOURS, FISTULA EXCLUSION, FEEDING TUBE PLACEMENT, WOUND VAC PLACEMENT. Surgeon(s) and Role:     * Gavin Hernandez MD - Primary    Local/Dose/Irrigation:  No local    Saline irrigation to sterile field. Quad Lumen 06/27/17 Right Internal jugular (Active)   Central Line Being Utilized Yes 7/6/2017  9:20 PM   Criteria for Appropriate Use Total parenteral nutrition 7/6/2017  9:20 PM   Site Assessment Clean, dry, & intact 7/6/2017  9:20 PM   Infiltration Assessment 0 7/6/2017  9:20 PM   Affected Extremity/Extremities Color distal to insertion site pink (or appropriate for race) 7/6/2017  9:20 PM   Date of Last Dressing Change 07/04/17 7/6/2017  9:20 PM   Dressing Status Clean, dry, & intact 7/6/2017  9:20 PM   Dressing Type Disk with Chlorhexadine gluconate (CHG) 7/6/2017  9:20 PM   Action Taken Open ports on tubing capped 7/6/2017  9:20 PM   Proximal Hub Color/Line Status White; Infusing 7/6/2017  9:20 PM   Positive Blood Return (Medial Site) Yes 7/6/2017  9:20 PM   Medial 1 Hub Color/Line Status Gray;Capped 7/6/2017  9:20 PM   Positive Blood Return (Lateral Site) Yes 7/6/2017  9:20 PM   Medial 2 Hub Color/Line Status Blue; Infusing 7/6/2017  9:20 PM   Positive Blood Return (Site #3) Yes 7/6/2017  9:20 PM   Distal Hub Color/Line Status Brown;Capped 7/6/2017  9:20 PM   Positive Blood Return (Site #4) Yes 7/6/2017  9:20 PM   Alcohol Cap Used Yes 7/6/2017  3:59 PM              Vacuum Assisted Closure Anterior;Mid Abdominal (Active)   Vac Status Suction, continuous 7/6/2017  9:20 PM   Suction (mmHg) 75 7/6/2017  3:59 PM   Site Assessment Clean, dry, & intact 7/6/2017  9:20 PM   Dressing Status Clean, dry, & intact 7/6/2017  9:20 PM   Drainage Description Green 7/6/2017  9:20 PM   Drainage Chamber Level (ml) 20 ml 7/6/2017  9:20 PM   Cannister Changed No 7/6/2017  9:20 PM   Output (ml) 20 ml 7/6/2017  9:14 PM       [place] 07/07/17 Right Abdomen (Active)   Site Assessment Clean, dry, & intact 7/7/2017 12:11 PM   Dressing Status Clean, dry, & intact 7/7/2017 12:11 PM       Ileostomy 06/07/00 Lower  Abdomen (Active)   Drainage Color Green 7/6/2017  9:20 PM   Site Assessment Clean, dry, intact 7/6/2017  9:20 PM   Treatment Site care 7/6/2017  9:20 PM   Output (ml) 10 mL 7/6/2017  9:10 PM       Drain Midline abdominal- external ostomy collection bag 07/01/17 Anterior; Upper Abdomen (Active)   Site Assessment Clean, dry, & intact 7/6/2017  9:20 PM   Dressing Status Other (comment) 7/6/2017  5:00 PM   Status Other (comment) 7/6/2017  5:00 PM   Drainage Description Green 7/6/2017  9:20 PM   Output (ml) 450 ml 7/6/2017  9:10 PM       Mal[place]ot Drain (Abdominal Drainage) 07/07/17 (Active)       [place] Drain (Abdominal Drainage) 07/07/17 (Active)      Airway - Endotracheal Tube 07/07/17 (Active)                   Dressing/Packing:  Wound Abdomen-DRESSING TYPE: Staples; Vacuum dressing;4 x 4;Special tape (comment) (07/07/17 1226)  Wound Arm Left-DRESSING TYPE: Open to air (07/05/17 1609)  [REMOVED] Wound Abdomen Anterior-DRESSING TYPE: ABD pad;Staples;Special tape (comment) (07/04/17 0600)  Splint/Cast:  ]    Other:  Reilly in place

## 2017-07-07 NOTE — PROGRESS NOTES
Bedside shift change report given to Kendra Gillette RN (oncoming nurse) by MAURI Aleman (offgoing nurse).  Report included the following information SBAR, MAR, Accordion and Cardiac Rhythm ST.

## 2017-07-07 NOTE — PERIOP NOTES
12:02 PM  TISSEEL 10 mL WAS GIVEN TO THE STERILE FIELD TO BE USED BY MD DURING PROCEDURE  LOT: [id number]  EXP: 11/30/2018  S/N: [id number]

## 2017-07-07 NOTE — BRIEF OP NOTE
BRIEF OPERATIVE NOTE    Date of Procedure: 7/7/2017   Preoperative Diagnosis: EC FISTULA, ABDOMINAL WOUND INFECTION  Postoperative Diagnosis: EC FISTULA, ABDOMINAL WOUND INFECTION    Procedure(s):  EXPLORATORY LAPAROTOMY WITH LYSIS OF ADHESIONS FOR 2 HOURS, FISTULA EXCLUSION, FEEDING TUBE PLACEMENT, WOUND VAC PLACEMENT  Surgeon(s) and Role:     * Lawson Bernard MD - Primary         Assistant Staff:       Surgical Staff:  Circ-1: Cristian Edwards RN  Circ-Relief: Priyank Gibson RN  Scrub Tech-1: David Bass  Scrub RN-Relief: Samuel Kunz RN  Surg Asst-1: Corrine Driscoll  Surg Asst-Relief: Shawnee Villasenor  Event Time In   Incision Start 4638   Incision Close 3697     Anesthesia: General   Estimated Blood Loss: 100 ml  Specimens: * No specimens in log *   Findings: There was a rosebud fistula in the midline wound. We spent two hours performing adhesiolysis to free up the fistula for a resection; however, bowel was completely encased in phlegmon process. We then proceed to place two [address] malecot drains. One is proximal and for decompression of fistula. The other will serve a feeding tube. Arias Ton drain was placed in midline and externalized to RLQ.   Complications: None  Implants: * No implants in log *

## 2017-07-07 NOTE — PROGRESS NOTES
Pt. Follows commands. Able to lift head > 3 seconds. Extubate pt. In PACU 5A per Dr. Jason Sandoval.

## 2017-07-07 NOTE — WOUND CARE
WOCN Note:     Patient is S/P EXPLORATORY LAPAROTOMY WITH LYSIS OF ADHESIONS FOR 2 HOURS, FISTULA EXCLUSION, FEEDING TUBE PLACEMENT, WOUND VAC PLACEMENT on 7. 7.2017. Visit at the request of PACU RN's due to inability to obtain NPWT at 100 mmHg as ordered by Dr Valentin Box. Attempted to trouble shoot (changed the track pad, switched cannister, added drape, attempted another vac machine) yet still unable to obtain ordered negative pressure. Paged Dr Valentin Box. Consulted Dr Gaudencio Tracey, he came to the bedside and he instructed the wound care team to take down the vac dressing and reapply. Removed NPWT from midline Abdominal Open Wound and replaced using 5 black 3 white and 2 adaptic at 50 mmHg. Unable to obtain any pressures greater due to drain connected to wall suction at 40 mmHg. Recommendation:  Abdominal Open Wound:  Continue NPWT at 50 mmHg continuously. Monitor drsg/cannister: call MD for persistent bleeding, reinforce drsg w/ drape if  leaking, if VAC off >2 hrs. then remove all and redress with dressing as ordered by on call MD.  VAC machine to accompany pt at all times. When Wound VAC is  discontinued, place in clear plastic bag and put in dirty utility room. Wound care will continue to follow.     SONY Matute RN  Office [phone]  Pager [phone]

## 2017-07-07 NOTE — PROGRESS NOTES
Dr. Luz Elena Davila came to assess the pt. In PACU 5A. She will need KUB. Change wound VAC setting from 125mmHg to 100 mmHg.

## 2017-07-07 NOTE — PERIOP NOTES
10:01 AM  SPOKE TO PATIENT'S MOTHER (ELMA ALAS) TO UPDATE HER ON PATIENT'S SURGICAL STATUS    12:03 PM  SPOKE TO PATIENT'S MOTHER (ELMA EVETTE) TO UPDATE HER ON PATIENT'S SURGICAL STATUS

## 2017-07-07 NOTE — ANESTHESIA POSTPROCEDURE EVALUATION
Post-Anesthesia Evaluation and Assessment    Patient: Berny Suarez MRN: [id number]  SSN: [id number]    YOB: 1975  Age: 46 y.o. Sex: female       Cardiovascular Function/Vital Signs  Visit Vitals    BP (!) 89/45    Pulse 82    Temp 36.7 °C (98.1 °F)    Resp 16    Ht 5' 4\" (1.626 m)    Wt 141.6 kg (312 lb 2.7 oz)    SpO2 100%    Breastfeeding No    BMI 53.58 kg/m2       Patient is status post general anesthesia for Procedure(s):  EXPLORATORY LAPAROTOMY WITH LYSIS OF ADHESIONS FOR 2 HOURS, FISTULA EXCLUSION, FEEDING TUBE PLACEMENT, WOUND VAC PLACEMENT. Nausea/Vomiting: None    Postoperative hydration reviewed and adequate. Pain:  Pain Scale 1: Numeric (0 - 10) (07/07/17 1243)  Pain Intensity 1: 0 (07/07/17 1243)   Managed    Neurological Status:   Neuro (WDL): Within Defined Limits (07/07/17 1243)  Neuro  Neurologic State: Alert (07/06/17 2050)  Orientation Level: Oriented X4 (07/06/17 2050)  Cognition: Appropriate for age attention/concentration (07/06/17 2050)  Speech: Clear (07/06/17 2050)  Assessment L Pupil: Brisk;Round (07/06/17 2050)  Size L Pupil (mm): 2 (07/06/17 2050)  Assessment R Pupil: Brisk;Round (07/06/17 2050)  Size R Pupil (mm): 2 (07/06/17 2050)  LUE Motor Response: Purposeful (07/06/17 2050)  LLE Motor Response: Purposeful (07/06/17 2050)  RUE Motor Response: Purposeful (07/06/17 2050)  RLE Motor Response: Purposeful (07/06/17 2050)   At baseline    Mental Status and Level of Consciousness: Arousable    Pulmonary Status:   O2 Device: Nasal cannula (07/07/17 1420)   Adequate oxygenation and airway patent    Complications related to anesthesia: None    Post-anesthesia assessment completed.  No concerns    Signed By: Slim Israel MD     July 7, 2017

## 2017-07-08 LAB
ALBUMIN SERPL BCP-MCNC: 1.5 G/DL (ref 3.5–5)
ALBUMIN/GLOB SERPL: 0.3 {RATIO} (ref 1.1–2.2)
ALP SERPL-CCNC: 120 U/L (ref 45–117)
ALT SERPL-CCNC: 13 U/L (ref 12–78)
ANION GAP BLD CALC-SCNC: 8 MMOL/L (ref 5–15)
ANION GAP BLD CALC-SCNC: 9 MMOL/L (ref 5–15)
AST SERPL W P-5'-P-CCNC: 11 U/L (ref 15–37)
BILIRUB SERPL-MCNC: 0.6 MG/DL (ref 0.2–1)
BLD PROD TYP BPU: NORMAL
BPU ID: NORMAL
BUN SERPL-MCNC: 29 MG/DL (ref 6–20)
BUN SERPL-MCNC: 30 MG/DL (ref 6–20)
BUN/CREAT SERPL: 22 (ref 12–20)
BUN/CREAT SERPL: 22 (ref 12–20)
CALCIUM SERPL-MCNC: 9.1 MG/DL (ref 8.5–10.1)
CALCIUM SERPL-MCNC: 9.2 MG/DL (ref 8.5–10.1)
CHLORIDE SERPL-SCNC: 114 MMOL/L (ref 97–108)
CHLORIDE SERPL-SCNC: 115 MMOL/L (ref 97–108)
CO2 SERPL-SCNC: 23 MMOL/L (ref 21–32)
CO2 SERPL-SCNC: 23 MMOL/L (ref 21–32)
CREAT SERPL-MCNC: 1.33 MG/DL (ref 0.55–1.02)
CREAT SERPL-MCNC: 1.35 MG/DL (ref 0.55–1.02)
ERYTHROCYTE [DISTWIDTH] IN BLOOD BY AUTOMATED COUNT: 15.8 % (ref 11.5–14.5)
GLOBULIN SER CALC-MCNC: 5.5 G/DL (ref 2–4)
GLUCOSE BLD STRIP.AUTO-MCNC: 113 MG/DL (ref 65–100)
GLUCOSE BLD STRIP.AUTO-MCNC: 157 MG/DL (ref 65–100)
GLUCOSE BLD STRIP.AUTO-MCNC: 211 MG/DL (ref 65–100)
GLUCOSE BLD STRIP.AUTO-MCNC: 98 MG/DL (ref 65–100)
GLUCOSE SERPL-MCNC: 156 MG/DL (ref 65–100)
GLUCOSE SERPL-MCNC: 157 MG/DL (ref 65–100)
HCT VFR BLD AUTO: 30.5 % (ref 35–47)
HGB BLD-MCNC: 9.6 G/DL (ref 11.5–16)
MAGNESIUM SERPL-MCNC: 2.2 MG/DL (ref 1.6–2.4)
MCH RBC QN AUTO: 28.6 PG (ref 26–34)
MCHC RBC AUTO-ENTMCNC: 31.5 G/DL (ref 30–36.5)
MCV RBC AUTO: 90.8 FL (ref 80–99)
PHOSPHATE SERPL-MCNC: 2.1 MG/DL (ref 2.6–4.7)
PLATELET # BLD AUTO: 495 K/UL (ref 150–400)
POTASSIUM SERPL-SCNC: 4.6 MMOL/L (ref 3.5–5.1)
POTASSIUM SERPL-SCNC: 4.6 MMOL/L (ref 3.5–5.1)
PROT SERPL-MCNC: 7 G/DL (ref 6.4–8.2)
RBC # BLD AUTO: 3.36 M/UL (ref 3.8–5.2)
SERVICE CMNT-IMP: ABNORMAL
SERVICE CMNT-IMP: NORMAL
SODIUM SERPL-SCNC: 146 MMOL/L (ref 136–145)
SODIUM SERPL-SCNC: 146 MMOL/L (ref 136–145)
STATUS OF UNIT,%ST: NORMAL
UNIT DIVISION, %UDIV: 0
WBC # BLD AUTO: 22.5 K/UL (ref 3.6–11)

## 2017-07-08 PROCEDURE — 74011000250 HC RX REV CODE- 250: Performed by: INTERNAL MEDICINE

## 2017-07-08 PROCEDURE — 80053 COMPREHEN METABOLIC PANEL: CPT | Performed by: SURGERY

## 2017-07-08 PROCEDURE — 80048 BASIC METABOLIC PNL TOTAL CA: CPT | Performed by: SURGERY

## 2017-07-08 PROCEDURE — 74011250636 HC RX REV CODE- 250/636: Performed by: INTERNAL MEDICINE

## 2017-07-08 PROCEDURE — 74011000250 HC RX REV CODE- 250: Performed by: SURGERY

## 2017-07-08 PROCEDURE — 85027 COMPLETE CBC AUTOMATED: CPT | Performed by: SURGERY

## 2017-07-08 PROCEDURE — 36415 COLL VENOUS BLD VENIPUNCTURE: CPT | Performed by: SURGERY

## 2017-07-08 PROCEDURE — 74011636637 HC RX REV CODE- 636/637: Performed by: SURGERY

## 2017-07-08 PROCEDURE — C9113 INJ PANTOPRAZOLE SODIUM, VIA: HCPCS | Performed by: SURGERY

## 2017-07-08 PROCEDURE — 74011250636 HC RX REV CODE- 250/636: Performed by: SURGERY

## 2017-07-08 PROCEDURE — 83735 ASSAY OF MAGNESIUM: CPT | Performed by: SURGERY

## 2017-07-08 PROCEDURE — 74011250636 HC RX REV CODE- 250/636: Performed by: NURSE PRACTITIONER

## 2017-07-08 PROCEDURE — 3331090001 HH PPS REVENUE CREDIT

## 2017-07-08 PROCEDURE — 65660000000 HC RM CCU STEPDOWN

## 2017-07-08 PROCEDURE — 74011000258 HC RX REV CODE- 258: Performed by: SURGERY

## 2017-07-08 PROCEDURE — 74011250636 HC RX REV CODE- 250/636: Performed by: PHYSICAL MEDICINE & REHABILITATION

## 2017-07-08 PROCEDURE — 3331090002 HH PPS REVENUE DEBIT

## 2017-07-08 PROCEDURE — 84100 ASSAY OF PHOSPHORUS: CPT | Performed by: SURGERY

## 2017-07-08 PROCEDURE — 82962 GLUCOSE BLOOD TEST: CPT

## 2017-07-08 PROCEDURE — 74011000258 HC RX REV CODE- 258: Performed by: INTERNAL MEDICINE

## 2017-07-08 RX ORDER — SODIUM CHLORIDE 0.9 % (FLUSH) 0.9 %
10 SYRINGE (ML) INJECTION EVERY 24 HOURS
Status: DISCONTINUED | OUTPATIENT
Start: 2017-07-08 | End: 2017-08-24 | Stop reason: HOSPADM

## 2017-07-08 RX ORDER — BACITRACIN 500 UNIT/G
1 PACKET (EA) TOPICAL AS NEEDED
Status: DISCONTINUED | OUTPATIENT
Start: 2017-07-08 | End: 2017-08-24 | Stop reason: HOSPADM

## 2017-07-08 RX ORDER — SODIUM CHLORIDE 0.9 % (FLUSH) 0.9 %
10-40 SYRINGE (ML) INJECTION EVERY 8 HOURS
Status: DISCONTINUED | OUTPATIENT
Start: 2017-07-08 | End: 2017-08-24 | Stop reason: HOSPADM

## 2017-07-08 RX ORDER — SODIUM CHLORIDE 0.9 % (FLUSH) 0.9 %
10 SYRINGE (ML) INJECTION AS NEEDED
Status: DISCONTINUED | OUTPATIENT
Start: 2017-07-08 | End: 2017-08-24 | Stop reason: HOSPADM

## 2017-07-08 RX ADMIN — ALTEPLASE 1 MG: 2.2 INJECTION, POWDER, LYOPHILIZED, FOR SOLUTION INTRAVENOUS at 16:55

## 2017-07-08 RX ADMIN — ONDANSETRON 8 MG: 2 INJECTION INTRAMUSCULAR; INTRAVENOUS at 04:50

## 2017-07-08 RX ADMIN — Medication 1 MG: at 22:20

## 2017-07-08 RX ADMIN — Medication 10 ML: at 22:21

## 2017-07-08 RX ADMIN — OCTREOTIDE ACETATE 50 MCG: 50 INJECTION, SOLUTION INTRAVENOUS; SUBCUTANEOUS at 08:42

## 2017-07-08 RX ADMIN — Medication 1 MG: at 14:16

## 2017-07-08 RX ADMIN — SODIUM CHLORIDE 5 MG: 9 INJECTION INTRAMUSCULAR; INTRAVENOUS; SUBCUTANEOUS at 20:47

## 2017-07-08 RX ADMIN — OCTREOTIDE ACETATE 50 MCG: 50 INJECTION, SOLUTION INTRAVENOUS; SUBCUTANEOUS at 16:55

## 2017-07-08 RX ADMIN — ENOXAPARIN SODIUM 40 MG: 40 INJECTION SUBCUTANEOUS at 22:20

## 2017-07-08 RX ADMIN — HYDROMORPHONE HYDROCHLORIDE 1 MG: 1 INJECTION, SOLUTION INTRAMUSCULAR; INTRAVENOUS; SUBCUTANEOUS at 04:13

## 2017-07-08 RX ADMIN — SODIUM CHLORIDE 100 MG: 900 INJECTION, SOLUTION INTRAVENOUS at 19:00

## 2017-07-08 RX ADMIN — ONDANSETRON 8 MG: 2 INJECTION INTRAMUSCULAR; INTRAVENOUS at 12:00

## 2017-07-08 RX ADMIN — HYDROMORPHONE HYDROCHLORIDE 1 MG: 1 INJECTION, SOLUTION INTRAMUSCULAR; INTRAVENOUS; SUBCUTANEOUS at 12:00

## 2017-07-08 RX ADMIN — SODIUM CHLORIDE, SODIUM LACTATE, POTASSIUM CHLORIDE, AND CALCIUM CHLORIDE 100 ML/HR: 600; 310; 30; 20 INJECTION, SOLUTION INTRAVENOUS at 09:52

## 2017-07-08 RX ADMIN — PIPERACILLIN SODIUM,TAZOBACTAM SODIUM 3.38 G: 3; .375 INJECTION, POWDER, FOR SOLUTION INTRAVENOUS at 14:16

## 2017-07-08 RX ADMIN — VANCOMYCIN HYDROCHLORIDE 1750 MG: 10 INJECTION, POWDER, LYOPHILIZED, FOR SOLUTION INTRAVENOUS at 03:27

## 2017-07-08 RX ADMIN — HYDROMORPHONE HYDROCHLORIDE 1 MG: 1 INJECTION, SOLUTION INTRAMUSCULAR; INTRAVENOUS; SUBCUTANEOUS at 08:53

## 2017-07-08 RX ADMIN — PIPERACILLIN SODIUM,TAZOBACTAM SODIUM 3.38 G: 3; .375 INJECTION, POWDER, FOR SOLUTION INTRAVENOUS at 05:53

## 2017-07-08 RX ADMIN — CALCIUM GLUCONATE: 94 INJECTION, SOLUTION INTRAVENOUS at 19:28

## 2017-07-08 RX ADMIN — OCTREOTIDE ACETATE 50 MCG: 50 INJECTION, SOLUTION INTRAVENOUS; SUBCUTANEOUS at 22:21

## 2017-07-08 RX ADMIN — ALTEPLASE 1 MG: 2.2 INJECTION, POWDER, LYOPHILIZED, FOR SOLUTION INTRAVENOUS at 19:26

## 2017-07-08 RX ADMIN — Medication 10 ML: at 14:18

## 2017-07-08 RX ADMIN — Medication 10 ML: at 05:54

## 2017-07-08 RX ADMIN — SODIUM CHLORIDE, SODIUM LACTATE, POTASSIUM CHLORIDE, AND CALCIUM CHLORIDE 100 ML/HR: 600; 310; 30; 20 INJECTION, SOLUTION INTRAVENOUS at 15:42

## 2017-07-08 RX ADMIN — HYDROMORPHONE HYDROCHLORIDE 1 MG: 1 INJECTION, SOLUTION INTRAMUSCULAR; INTRAVENOUS; SUBCUTANEOUS at 16:54

## 2017-07-08 RX ADMIN — HUMAN INSULIN 2 UNITS: 100 INJECTION, SOLUTION SUBCUTANEOUS at 07:17

## 2017-07-08 RX ADMIN — HYDROMORPHONE HYDROCHLORIDE 1 MG: 1 INJECTION, SOLUTION INTRAMUSCULAR; INTRAVENOUS; SUBCUTANEOUS at 20:07

## 2017-07-08 RX ADMIN — Medication 1 MG: at 05:53

## 2017-07-08 RX ADMIN — PIPERACILLIN SODIUM,TAZOBACTAM SODIUM 3.38 G: 3; .375 INJECTION, POWDER, FOR SOLUTION INTRAVENOUS at 22:20

## 2017-07-08 RX ADMIN — HUMAN INSULIN 3 UNITS: 100 INJECTION, SOLUTION SUBCUTANEOUS at 01:08

## 2017-07-08 RX ADMIN — Medication: at 18:12

## 2017-07-08 RX ADMIN — Medication: at 04:39

## 2017-07-08 RX ADMIN — HYDROMORPHONE HYDROCHLORIDE 1 MG: 1 INJECTION, SOLUTION INTRAMUSCULAR; INTRAVENOUS; SUBCUTANEOUS at 01:09

## 2017-07-08 RX ADMIN — SODIUM CHLORIDE 40 MG: 9 INJECTION INTRAMUSCULAR; INTRAVENOUS; SUBCUTANEOUS at 08:42

## 2017-07-08 NOTE — PROGRESS NOTES
1945- Patient arrived on unit. Report given. 2000- Assessment completed, patient reports moderate pain at this time. 2030- Patient asleep, discussed condition with patients parents in waiting room. 2145- Patient awake, pain moderately controlled, asking for her parents. 2200- Called emergency contact number for patients parents, message left. 2230- Patient asleep at this time. [facility] emergency contact alternate number located in chart, no answer, left message. 0000- Patient remains asleep at this time, no signs of distress noted. No changes noted at this time.

## 2017-07-08 NOTE — PROGRESS NOTES
Pulmonary    Returned to ICU following surgery for fistual and infection.   Previously had exploration for ischemic bowel now has multiple drains and a wound vac in place    Not on pressors  On RA  Has PCA for pain control    Does not appear to require critical care coverage at this time  OK for PSBU from a critical care standpoint     Will see again if needed    Kvng Aguilera MD

## 2017-07-08 NOTE — PROGRESS NOTES
Subjective  Complains of abdominal pain  Not on any pressors      Temp:  [97.7 °F (36.5 °C)-99.8 °F (37.7 °C)]   Pulse (Heart Rate):  []   BP: ()/()   Resp Rate:  [7-28]   O2 Sat (%):  [87 %-100 %]   Weight:  [312 lb 2.7 oz (141.6 kg)-325 lb 2.9 oz (147.5 kg)]   07/08 0701 - 07/08 1900  In: 4321 [I.V.:2370]  Out: 6664 [Urine:1025; Drains:270]  07/06 1901 - 07/08 0700  In: 64225.5 [P.O.:400; I.V.:9071.5]  Out: 0244 [JDAUV:5979; Drains:900]      Objective  Gen- Alert in NAD  Lungs- CTA   H- RRR  Abd- tender  Left drain bilious  Right upper drain minimal   Babara Blood is mild serosanginous output  Vac in place.      Recent Results (from the past 24 hour(s))   GLUCOSE, POC    Collection Time: 07/08/17  1:02 AM   Result Value Ref Range    Glucose (POC) 211 (H) 65 - 100 mg/dL    Performed by SHERRILL PARISI    MAGNESIUM    Collection Time: 07/08/17  4:42 AM   Result Value Ref Range    Magnesium 2.2 1.6 - 2.4 mg/dL   PHOSPHORUS    Collection Time: 07/08/17  4:42 AM   Result Value Ref Range    Phosphorus 2.1 (L) 2.6 - 4.7 MG/DL   METABOLIC PANEL, BASIC    Collection Time: 07/08/17  4:42 AM   Result Value Ref Range    Sodium 146 (H) 136 - 145 mmol/L    Potassium 4.6 3.5 - 5.1 mmol/L    Chloride 115 (H) 97 - 108 mmol/L    CO2 23 21 - 32 mmol/L    Anion gap 8 5 - 15 mmol/L    Glucose 157 (H) 65 - 100 mg/dL    BUN 29 (H) 6 - 20 MG/DL    Creatinine 1.33 (H) 0.55 - 1.02 MG/DL    BUN/Creatinine ratio 22 (H) 12 - 20      GFR est AA 54 (L) >60 ml/min/1.73m2    GFR est non-AA 44 (L) >60 ml/min/1.73m2    Calcium 9.1 8.5 - 10.1 MG/DL   CBC W/O DIFF    Collection Time: 07/08/17  4:42 AM   Result Value Ref Range    WBC 22.5 (H) 3.6 - 11.0 K/uL    RBC 3.36 (L) 3.80 - 5.20 M/uL    HGB 9.6 (L) 11.5 - 16.0 g/dL    HCT 30.5 (L) 35.0 - 47.0 %    MCV 90.8 80.0 - 99.0 FL    MCH 28.6 26.0 - 34.0 PG    MCHC 31.5 30.0 - 36.5 g/dL    RDW 15.8 (H) 11.5 - 14.5 %    PLATELET 586 (H) 228 - 237 K/uL   METABOLIC PANEL, COMPREHENSIVE Collection Time: 07/08/17  4:42 AM   Result Value Ref Range    Sodium 146 (H) 136 - 145 mmol/L    Potassium 4.6 3.5 - 5.1 mmol/L    Chloride 114 (H) 97 - 108 mmol/L    CO2 23 21 - 32 mmol/L    Anion gap 9 5 - 15 mmol/L    Glucose 156 (H) 65 - 100 mg/dL    BUN 30 (H) 6 - 20 MG/DL    Creatinine 1.35 (H) 0.55 - 1.02 MG/DL    BUN/Creatinine ratio 22 (H) 12 - 20      GFR est AA 53 (L) >60 ml/min/1.73m2    GFR est non-AA 44 (L) >60 ml/min/1.73m2    Calcium 9.2 8.5 - 10.1 MG/DL    Bilirubin, total 0.6 0.2 - 1.0 MG/DL    ALT (SGPT) 13 12 - 78 U/L    AST (SGOT) 11 (L) 15 - 37 U/L    Alk. phosphatase 120 (H) 45 - 117 U/L    Protein, total 7.0 6.4 - 8.2 g/dL    Albumin 1.5 (L) 3.5 - 5.0 g/dL    Globulin 5.5 (H) 2.0 - 4.0 g/dL    A-G Ratio 0.3 (L) 1.1 - 2.2     GLUCOSE, POC    Collection Time: 07/08/17  6:16 AM   Result Value Ref Range    Glucose (POC) 157 (H) 65 - 100 mg/dL    Performed by SHERRILL PARISI    GLUCOSE, POC    Collection Time: 07/08/17 11:50 AM   Result Value Ref Range    Glucose (POC) 98 65 - 100 mg/dL    Performed by Matthew Unger) [facility]          Active Problems:    Abdominal pain (6/25/2017)      Small bowel ischemia ([address] (6/28/2017)      Overview: CT abd/pelvis 6/28/17      1. The stomach and proximal small bowel loops are distended. There is a       loop of      small bowel in the midabdomen which is mildly dilated and does not       demonstrate      enhancement in the wall and there is suspicion of pneumatosis and this       leads to      a loop of small bowel which demonstrates thickening of the wall and       findings are      suspicious for ischemia. 2. There is extensive mesenteric edema and a small amount of ascites in       the      pelvis. 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 There is nonocclusive thrombus in the SMA.             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    Seems to be stable- She is not on any pressors and is making urine  WBC elevated but is only PO day 1- will continue abx per ID and monitor  Continue TPN for now  OOB   PT/OT  DVT prophylaxis  Ok to go to [facility]

## 2017-07-08 NOTE — PROGRESS NOTES
ID Progress Note  2017    Subjective:     Status post OR, having trouble with wound vac seal    Objective:     Antibiotics:  1. Vancomycin  2. Zosyn   3. Eraxis      Vitals:   Visit Vitals    /83    Pulse 98    Temp 98.8 °F (37.1 °C)    Resp 15    Ht 5' 4\" (1.626 m)    Wt 147.5 kg (325 lb 2.9 oz)    LMP 2017 (Approximate)    SpO2 95%    Breastfeeding No    BMI 55.82 kg/m2        Tmax:  Temp (24hrs), Av.7 °F (37.1 °C), Min:98.1 °F (36.7 °C), Max:99.8 °F (37.7 °C)      Exam:  Lungs:  clear to auscultation bilaterally  Heart:  regularly irregular rhythm  Abdomen:  abnormal findings:  tenderness moderate in the entire abdomen, hypoactive bowel sounds    Labs:      Recent Labs      17   0442  17   0415  17   0420   WBC  22.5*   --   16.3*   HGB  9.6*   --   7.4*   PLT  495*   --   393   BUN  29*  30*  31*  28*   CREA  1.33*  1.35*  1.54*  1.66*   SGOT  11*   --    --    AP  120*   --    --    TBILI  0.6   --    --        Cultures:     Lab Results   Component Value Date/Time    Specimen Description: URINE 2009 07:45 PM     Lab Results   Component Value Date/Time    Culture result: NO GROWTH ON SOLID MEDIA AT 4 DAYS 2017 12:17 PM    Culture result:  2017 12:17 PM     **METHICILLIN RESISTANT STAPHYLOCOCCUS AUREUS**  ISOLATED FROM THIO BROTH ONLY      Culture result: NO GROWTH 5 DAYS 2017 02:35 PM       Radiology:     Line/Insert Date:           Assessment:     1. Ischemic gut with frozen abdomen  2. Leukocytosis--back up after surgery  3. Cultures negative to date    Objective:     1. Continue IV therapy  2. Follow up cultures and studies  3.  Work up fevers    Corwin Vasquez MD

## 2017-07-08 NOTE — PROGRESS NOTES
Dino Wilkerson M.D.  [phone]               GASTROENTEROLOGY PROGRESS NOTE        NAME: Yessenia Antonio   :  1975   MRN:  [id number]       Subjective: Went to the OR yesterday and had exploratory laparotomy, lysis of adhesions, fistula exclusion, feeding tube placement and wound vac. She is resting and did not want to answer any questions. Appears comfortable. Objective:   VITALS:   Last 24hrs VS reviewed. Most recent are:  Visit Vitals    /80    Pulse 98    Temp 98.3 °F (36.8 °C)    Resp 15    Ht 5' 4\" (1.626 m)    Wt 147.5 kg (325 lb 2.9 oz)    SpO2 98%    Breastfeeding No    BMI 55.82 kg/m2       Intake/Output Summary (Last 24 hours) at 17 0348  Last data filed at 17 0500   Gross per 24 hour   Intake          4831.83 ml   Output             5870 ml   Net         -1038.17 ml       PHYSICAL EXAM:  General: Alert, in no acute distress    HEENT: Anicteric conjunctiva. Lungs:            CTA Bilaterally  Heart:  Normal S1, S2    Abdomen: Soft, Non distended, Non tender. Normoactive bowel sounds, no HSM,   no rebound/guarding  MSK:   Normal muscle tone  Skin:   Warm to touch  Extremities: Negative bilateral pedal edema   Psych:   Good insight. Not anxious nor agitated. Lab Data Reviewed:   Recent Labs      17   0420   WBC  16.3*   HGB  7.4*   HCT  23.4*   PLT  393     Recent Labs      17   0415  17   0420   NA  141  142   K  4.4  3.8   CL  111*  109*   CO2  22  26   BUN  31*  28*   CREA  1.54*  1.66*   GLU  99  115*   CA  8.8  9.0   MG  2.2  2.1   PHOS  3.3  4.9*     No results for input(s): SGOT, GPT, AP, TBIL, TP, ALB, GLOB, GGT, AML, LPSE in the last 72 hours. No lab exists for component: AMYP, HLPSE  No results for input(s): INR, PTP, APTT in the last 72 hours. No lab exists for component: INREXT   No results for input(s): FE, TIBC, PSAT, FERR in the last 72 hours.   No results for input(s): CPK, CKMB in the last 72 hours. No lab exists for component: MELLY    See Electronic Medical Record for all procedure/radiology reports and details which were not copied into this note but were reviewed prior to the creation of the Plan.     Assessment:   · Ischemic bowel s/p SMA stenting  · Crohn's Disease s/p ex lap  · EC fistula     Patient Active Problem List   Diagnosis Code    Crohn's disease ([address] K50.90    DVT (deep venous thrombosis) ([address] I82.409    Incarcerated ventral hernia K46.0    Right flank pain R10.9    Perforated bowel ([address] K63.1    Abdominal pain R10.9    Small bowel ischemia ([address] K55.9    Superior mesenteric artery thrombosis ([address] K55.069    Enterocutaneous fistula K63.2       Plan:   · Surgery management  · Following  · Please call us during the weekend if additional questions arise       Signed by: Matilde Estrada MD         7/8/2017  6:32 AM

## 2017-07-08 NOTE — PERIOP NOTES
TRANSFER - OUT REPORT:    Verbal report given to ICU nurse (name) on Berny Suarez  being transferred to ICU 6 (unit) for routine post - op       Report consisted of patients Situation, Background, Assessment and   Recommendations(SBAR). Time Pre op antibiotic [id number]  Anesthesia Stop time: 2430  Reilly Present on Transfer to floor:Y  Order for Reilly on Chart:Y    Information from the following report(s) SBAR, OR Summary, Procedure Summary, Intake/Output and MAR was reviewed with the receiving nurse. Opportunity for questions and clarification was provided. Is the patient on 02? NO       L/Min RA       Other None    Is the patient on a monitor? YES    Is the nurse transporting with the patient? YES    Surgical Waiting Area notified of patient's transfer from PACU? YES      The following personal items collected during your admission accompanied patient upon transfer:   Dental Appliance: Dental Appliances: None  Vision: Visual Aid: Glasses  Hearing Aid:    Pattie Waters: Pattie Cesars: Body Piercing, Earrings, Ring, With patient, Other (comment) (Bilat nipple rings, tongue ring, nose ring)  Clothing: Clothing: None  Other Valuables:  Other Valuables: Cell Phone  Valuables sent to safe: Personal Items Sent to Safe: None

## 2017-07-09 LAB
ANION GAP BLD CALC-SCNC: 8 MMOL/L (ref 5–15)
ANION GAP BLD CALC-SCNC: 9 MMOL/L (ref 5–15)
BASOPHILS # BLD AUTO: 0 K/UL (ref 0–0.1)
BASOPHILS # BLD: 0 % (ref 0–1)
BUN SERPL-MCNC: 31 MG/DL (ref 6–20)
BUN SERPL-MCNC: 31 MG/DL (ref 6–20)
BUN/CREAT SERPL: 23 (ref 12–20)
BUN/CREAT SERPL: 23 (ref 12–20)
CALCIUM SERPL-MCNC: 8.6 MG/DL (ref 8.5–10.1)
CALCIUM SERPL-MCNC: 8.7 MG/DL (ref 8.5–10.1)
CHLORIDE SERPL-SCNC: 114 MMOL/L (ref 97–108)
CHLORIDE SERPL-SCNC: 114 MMOL/L (ref 97–108)
CO2 SERPL-SCNC: 24 MMOL/L (ref 21–32)
CO2 SERPL-SCNC: 25 MMOL/L (ref 21–32)
CREAT SERPL-MCNC: 1.33 MG/DL (ref 0.55–1.02)
CREAT SERPL-MCNC: 1.36 MG/DL (ref 0.55–1.02)
DATE LAST DOSE: ABNORMAL
DIFFERENTIAL METHOD BLD: ABNORMAL
EOSINOPHIL # BLD: 0.2 K/UL (ref 0–0.4)
EOSINOPHIL NFR BLD: 1 % (ref 0–7)
ERYTHROCYTE [DISTWIDTH] IN BLOOD BY AUTOMATED COUNT: 15.9 % (ref 11.5–14.5)
GLUCOSE BLD STRIP.AUTO-MCNC: 108 MG/DL (ref 65–100)
GLUCOSE BLD STRIP.AUTO-MCNC: 152 MG/DL (ref 65–100)
GLUCOSE BLD STRIP.AUTO-MCNC: 199 MG/DL (ref 65–100)
GLUCOSE BLD STRIP.AUTO-MCNC: 97 MG/DL (ref 65–100)
GLUCOSE SERPL-MCNC: 166 MG/DL (ref 65–100)
GLUCOSE SERPL-MCNC: 99 MG/DL (ref 65–100)
HCT VFR BLD AUTO: 28.5 % (ref 35–47)
HGB BLD-MCNC: 9.1 G/DL (ref 11.5–16)
LYMPHOCYTES # BLD AUTO: 19 % (ref 12–49)
LYMPHOCYTES # BLD: 3.6 K/UL (ref 0.8–3.5)
MAGNESIUM SERPL-MCNC: 2.2 MG/DL (ref 1.6–2.4)
MAGNESIUM SERPL-MCNC: 2.2 MG/DL (ref 1.6–2.4)
MCH RBC QN AUTO: 29.3 PG (ref 26–34)
MCHC RBC AUTO-ENTMCNC: 31.9 G/DL (ref 30–36.5)
MCV RBC AUTO: 91.6 FL (ref 80–99)
METAMYELOCYTES NFR BLD MANUAL: 2 %
MONOCYTES # BLD: 1.5 K/UL (ref 0–1)
MONOCYTES NFR BLD AUTO: 8 % (ref 5–13)
MYELOCYTES NFR BLD MANUAL: 1 %
NEUTS BAND NFR BLD MANUAL: 1 % (ref 0–6)
NEUTS SEG # BLD: 13.2 K/UL (ref 1.8–8)
NEUTS SEG NFR BLD AUTO: 68 % (ref 32–75)
NRBC # BLD: 0.02 K/UL (ref 0–0.01)
NRBC BLD-RTO: 0.1 PER 100 WBC
PHOSPHATE SERPL-MCNC: 4.8 MG/DL (ref 2.6–4.7)
PHOSPHATE SERPL-MCNC: 5.4 MG/DL (ref 2.6–4.7)
PLATELET # BLD AUTO: 511 K/UL (ref 150–400)
PLATELET COMMENTS,PCOM: ABNORMAL
POTASSIUM SERPL-SCNC: 4.6 MMOL/L (ref 3.5–5.1)
POTASSIUM SERPL-SCNC: 4.8 MMOL/L (ref 3.5–5.1)
RBC # BLD AUTO: 3.11 M/UL (ref 3.8–5.2)
RBC MORPH BLD: ABNORMAL
RBC MORPH BLD: ABNORMAL
REPORTED DOSE,DOSE: ABNORMAL UNITS
REPORTED DOSE/TIME,TMG: 327
SERVICE CMNT-IMP: ABNORMAL
SERVICE CMNT-IMP: NORMAL
SODIUM SERPL-SCNC: 147 MMOL/L (ref 136–145)
SODIUM SERPL-SCNC: 147 MMOL/L (ref 136–145)
VANCOMYCIN TROUGH SERPL-MCNC: 18 UG/ML (ref 5–10)
WBC # BLD AUTO: 19.2 K/UL (ref 3.6–11)
WBC NRBC COR # BLD: ABNORMAL 10*3/UL

## 2017-07-09 PROCEDURE — 80202 ASSAY OF VANCOMYCIN: CPT | Performed by: SURGERY

## 2017-07-09 PROCEDURE — 74011250636 HC RX REV CODE- 250/636: Performed by: INTERNAL MEDICINE

## 2017-07-09 PROCEDURE — 84100 ASSAY OF PHOSPHORUS: CPT | Performed by: SURGERY

## 2017-07-09 PROCEDURE — 74011250636 HC RX REV CODE- 250/636: Performed by: NURSE PRACTITIONER

## 2017-07-09 PROCEDURE — 74011000250 HC RX REV CODE- 250: Performed by: INTERNAL MEDICINE

## 2017-07-09 PROCEDURE — 3331090001 HH PPS REVENUE CREDIT

## 2017-07-09 PROCEDURE — 83735 ASSAY OF MAGNESIUM: CPT | Performed by: SURGERY

## 2017-07-09 PROCEDURE — 74011636637 HC RX REV CODE- 636/637: Performed by: SURGERY

## 2017-07-09 PROCEDURE — 65660000000 HC RM CCU STEPDOWN

## 2017-07-09 PROCEDURE — 80048 BASIC METABOLIC PNL TOTAL CA: CPT | Performed by: SURGERY

## 2017-07-09 PROCEDURE — 82962 GLUCOSE BLOOD TEST: CPT

## 2017-07-09 PROCEDURE — 74011000250 HC RX REV CODE- 250: Performed by: SURGERY

## 2017-07-09 PROCEDURE — 36415 COLL VENOUS BLD VENIPUNCTURE: CPT | Performed by: SURGERY

## 2017-07-09 PROCEDURE — 74011250636 HC RX REV CODE- 250/636: Performed by: ANESTHESIOLOGY

## 2017-07-09 PROCEDURE — C9113 INJ PANTOPRAZOLE SODIUM, VIA: HCPCS | Performed by: SURGERY

## 2017-07-09 PROCEDURE — 74011000258 HC RX REV CODE- 258: Performed by: INTERNAL MEDICINE

## 2017-07-09 PROCEDURE — 74011250636 HC RX REV CODE- 250/636: Performed by: PHYSICAL MEDICINE & REHABILITATION

## 2017-07-09 PROCEDURE — 36591 DRAW BLOOD OFF VENOUS DEVICE: CPT

## 2017-07-09 PROCEDURE — 74011000258 HC RX REV CODE- 258: Performed by: SURGERY

## 2017-07-09 PROCEDURE — 74011250636 HC RX REV CODE- 250/636: Performed by: SURGERY

## 2017-07-09 PROCEDURE — 74011000250 HC RX REV CODE- 250: Performed by: ANESTHESIOLOGY

## 2017-07-09 PROCEDURE — 3331090002 HH PPS REVENUE DEBIT

## 2017-07-09 PROCEDURE — 85025 COMPLETE CBC W/AUTO DIFF WBC: CPT | Performed by: SURGERY

## 2017-07-09 RX ORDER — VANCOMYCIN/0.9 % SOD CHLORIDE 1.5G/250ML
1500 PLASTIC BAG, INJECTION (ML) INTRAVENOUS EVERY 24 HOURS
Status: DISCONTINUED | OUTPATIENT
Start: 2017-07-10 | End: 2017-07-11

## 2017-07-09 RX ORDER — VANCOMYCIN HYDROCHLORIDE
1250 EVERY 24 HOURS
Status: DISCONTINUED | OUTPATIENT
Start: 2017-07-10 | End: 2017-07-09

## 2017-07-09 RX ADMIN — HYDROMORPHONE HYDROCHLORIDE 1 MG: 1 INJECTION, SOLUTION INTRAMUSCULAR; INTRAVENOUS; SUBCUTANEOUS at 16:58

## 2017-07-09 RX ADMIN — HYDROMORPHONE HYDROCHLORIDE 1 MG: 1 INJECTION, SOLUTION INTRAMUSCULAR; INTRAVENOUS; SUBCUTANEOUS at 19:21

## 2017-07-09 RX ADMIN — Medication: at 17:40

## 2017-07-09 RX ADMIN — Medication 10 ML: at 16:59

## 2017-07-09 RX ADMIN — Medication 10 ML: at 21:02

## 2017-07-09 RX ADMIN — VANCOMYCIN HYDROCHLORIDE 1750 MG: 10 INJECTION, POWDER, LYOPHILIZED, FOR SOLUTION INTRAVENOUS at 03:42

## 2017-07-09 RX ADMIN — PIPERACILLIN SODIUM,TAZOBACTAM SODIUM 3.38 G: 3; .375 INJECTION, POWDER, FOR SOLUTION INTRAVENOUS at 22:20

## 2017-07-09 RX ADMIN — SODIUM CHLORIDE 5 MG: 9 INJECTION INTRAMUSCULAR; INTRAVENOUS; SUBCUTANEOUS at 22:19

## 2017-07-09 RX ADMIN — SODIUM CHLORIDE, SODIUM LACTATE, POTASSIUM CHLORIDE, AND CALCIUM CHLORIDE 100 ML/HR: 600; 310; 30; 20 INJECTION, SOLUTION INTRAVENOUS at 12:05

## 2017-07-09 RX ADMIN — ALTEPLASE 1 MG: 2.2 INJECTION, POWDER, LYOPHILIZED, FOR SOLUTION INTRAVENOUS at 08:32

## 2017-07-09 RX ADMIN — ONDANSETRON 8 MG: 2 INJECTION INTRAMUSCULAR; INTRAVENOUS at 14:29

## 2017-07-09 RX ADMIN — SODIUM CHLORIDE 40 MG: 9 INJECTION INTRAMUSCULAR; INTRAVENOUS; SUBCUTANEOUS at 08:31

## 2017-07-09 RX ADMIN — HYDROMORPHONE HYDROCHLORIDE 1 MG: 1 INJECTION, SOLUTION INTRAMUSCULAR; INTRAVENOUS; SUBCUTANEOUS at 22:20

## 2017-07-09 RX ADMIN — Medication 10 ML: at 13:23

## 2017-07-09 RX ADMIN — PIPERACILLIN SODIUM,TAZOBACTAM SODIUM 3.38 G: 3; .375 INJECTION, POWDER, FOR SOLUTION INTRAVENOUS at 13:17

## 2017-07-09 RX ADMIN — HYDROMORPHONE HYDROCHLORIDE 1 MG: 1 INJECTION, SOLUTION INTRAMUSCULAR; INTRAVENOUS; SUBCUTANEOUS at 14:29

## 2017-07-09 RX ADMIN — SODIUM CHLORIDE 100 MG: 900 INJECTION, SOLUTION INTRAVENOUS at 17:03

## 2017-07-09 RX ADMIN — Medication 1 MG: at 22:19

## 2017-07-09 RX ADMIN — Medication 10 ML: at 06:39

## 2017-07-09 RX ADMIN — HYDROMORPHONE HYDROCHLORIDE 1 MG: 1 INJECTION, SOLUTION INTRAMUSCULAR; INTRAVENOUS; SUBCUTANEOUS at 00:27

## 2017-07-09 RX ADMIN — OCTREOTIDE ACETATE 50 MCG: 50 INJECTION, SOLUTION INTRAVENOUS; SUBCUTANEOUS at 16:06

## 2017-07-09 RX ADMIN — HYDROMORPHONE HYDROCHLORIDE 1 MG: 1 INJECTION, SOLUTION INTRAMUSCULAR; INTRAVENOUS; SUBCUTANEOUS at 10:01

## 2017-07-09 RX ADMIN — HUMAN INSULIN 2 UNITS: 100 INJECTION, SOLUTION SUBCUTANEOUS at 06:59

## 2017-07-09 RX ADMIN — Medication 1 MG: at 06:39

## 2017-07-09 RX ADMIN — Medication 1 MG: at 13:17

## 2017-07-09 RX ADMIN — ALTEPLASE 1 MG: 2.2 INJECTION, POWDER, LYOPHILIZED, FOR SOLUTION INTRAVENOUS at 03:46

## 2017-07-09 RX ADMIN — ALTEPLASE 1 MG: 2.2 INJECTION, POWDER, LYOPHILIZED, FOR SOLUTION INTRAVENOUS at 00:42

## 2017-07-09 RX ADMIN — SODIUM CHLORIDE 5 MG: 9 INJECTION INTRAMUSCULAR; INTRAVENOUS; SUBCUTANEOUS at 11:07

## 2017-07-09 RX ADMIN — CALCIUM GLUCONATE: 94 INJECTION, SOLUTION INTRAVENOUS at 17:42

## 2017-07-09 RX ADMIN — HUMAN INSULIN 2 UNITS: 100 INJECTION, SOLUTION SUBCUTANEOUS at 00:41

## 2017-07-09 RX ADMIN — ONDANSETRON 8 MG: 2 INJECTION INTRAMUSCULAR; INTRAVENOUS at 21:02

## 2017-07-09 RX ADMIN — ONDANSETRON 8 MG: 2 INJECTION INTRAMUSCULAR; INTRAVENOUS at 06:39

## 2017-07-09 RX ADMIN — PIPERACILLIN SODIUM,TAZOBACTAM SODIUM 3.38 G: 3; .375 INJECTION, POWDER, FOR SOLUTION INTRAVENOUS at 06:39

## 2017-07-09 RX ADMIN — ENOXAPARIN SODIUM 40 MG: 40 INJECTION SUBCUTANEOUS at 22:20

## 2017-07-09 RX ADMIN — Medication: at 07:27

## 2017-07-09 RX ADMIN — Medication 10 ML: at 21:03

## 2017-07-09 RX ADMIN — HYDROMORPHONE HYDROCHLORIDE 1 MG: 1 INJECTION, SOLUTION INTRAMUSCULAR; INTRAVENOUS; SUBCUTANEOUS at 06:39

## 2017-07-09 RX ADMIN — OCTREOTIDE ACETATE 50 MCG: 50 INJECTION, SOLUTION INTRAVENOUS; SUBCUTANEOUS at 22:21

## 2017-07-09 RX ADMIN — OCTREOTIDE ACETATE 50 MCG: 50 INJECTION, SOLUTION INTRAVENOUS; SUBCUTANEOUS at 08:31

## 2017-07-09 RX ADMIN — ONDANSETRON 8 MG: 2 INJECTION INTRAMUSCULAR; INTRAVENOUS at 00:38

## 2017-07-09 NOTE — PROGRESS NOTES
15:50 TRANSFER - IN REPORT:    Verbal report received from Dick Sheehan RN(name) on Domingo Ayala  being received from ICU for routine progression of care      Report consisted of patients Situation, Background, Assessment and   Recommendations(SBAR). Information from the following report(s) SBAR, Kardex, OR Summary and Recent Results was reviewed with the receiving nurse. Opportunity for questions and clarification was provided. Assessment completed upon patients arrival to unit and care assumed. 20:00 Primary Nurse Laural [place], RN and Regina Shah RN performed a dual skin assessment on this patient Impairment noted- see wound doc flow sheet  Amador score is 19  Bedside shift change report given to Regina Shah RN (oncoming nurse) by Amaury Potter RN (offgoing nurse). Report included the following information SBAR, Kardex and Recent Results.

## 2017-07-09 NOTE — PROGRESS NOTES
Day #15 of Vancomycin  Indication:  Surgical site infection / ischemic bowel S/P SMA stenting   Current regimen:  1.25 gm IV Q 24 hr  Abx regimen:  Anidulafungin + Zosyn + Vancomycin  ID Following ?: YES  Concomitant nephrotoxic drugs (requires more frequent monitoring): None  Frequency of BMP?: Daily    Recent Labs      17   0442  17   0415   WBC  22.5*   --    CREA  1.33*  1.35*  1.54*   BUN  29*  30*  31*     Est CrCl: ~80-85 ml/min; UO: ~1 ml/kg/hr  Temp (24hrs), Av.6 °F (37 °C), Min:98.1 °F (36.7 °C), Max:98.8 °F (37.1 °C)    Cultures:    Abscess cx: light MRSA (Vanc JOY = 2), final   Blood: NG, final   C.diff (DNA): negative   Incisional wound: MRSA (in thio broth only), final    Goal trough = 10 - 15 mcg/mL    Recent trough history (date/time/level/dose/action taken):   = 22.9 mcg/mL (13.5 hour level) on 2000 mg Q12H  [change to 2000 mg Q24H]   = 21 mcg/mL (48 hour level) on 2000 mg Q24H  [change to 1750 mg Q48H]   = 7.8 mcg/mL (47 hour level) on 1750 mg Q48H  [change to 1750 mg Q24H]   = 13.8 mcg/mL (24 hour level) on 1750 mg Q24H  [therapeutic]   = 18 mcg/ml (24 hr level) on 1750 mg Q24H - dose adjusted to 1250mg Q24H    Plan: Continue current regimen. Dose is now less than 10 mg/kg which is not ideal - will adjust to 1500 mg IV Q 24 hr with a target trough of ~15 mcg/ml. Pharmacy will continue to monitor patient daily and will make dosage adjustments based upon changing renal function.

## 2017-07-09 NOTE — PROGRESS NOTES
Visited with patient's mother in 26 upon realization that patient had been transferred to ICU since our last visit. Provided empathetic listening as mom shared her fears about the patient's prognosis and \"continous backwards moves. \" Mom is especially fearful about the source of infection and repeatedly stated her concerns for how much more the patient can take. Mom also voiced her feelings of guilt because the patient has repeatedly requested that she not leave the bedside but she has to return to work in the coming week. Offered assurances of continued support from spiritual care services for both patient and family and helped mom identify and vocalize ways she repeatedly demonstrates love, dedication and advocacy. Mom also expressed how much she and the patient appreciated music therapy and how this helped the patient to process emotions as well.  will pass this information on to [facility] [address] [place] [address] for follow-up as able. Chaplain Declan Ibrahim M.Div.    Paging Service [phone])

## 2017-07-09 NOTE — PROGRESS NOTES
0730: Bedside shift change report given to [address] [place] [address] (oncoming nurse) by Zoila Gaona RN (offgoing nurse). Report included the following information SBAR, Kardex, ED Summary, OR Summary, Procedure Summary, Intake/Output, MAR, Accordion, Recent Results, Med Rec Status and Cardiac Rhythm NSR.     1550: TRANSFER - OUT REPORT:    Verbal report given to Gina(name) on Climmie Bun  being transferred to [facility](unit) for routine progression of care       Report consisted of patients Situation, Background, Assessment and   Recommendations(SBAR). Information from the following report(s) SBAR, Kardex, ED Summary, OR Summary, Procedure Summary, Intake/Output, MAR, Accordion, Recent Results, Med Rec Status and Cardiac Rhythm NSR/Sinus tach was reviewed with the receiving nurse. Lines:   Quad Lumen 06/27/17 Right Internal jugular (Active)   Central Line Being Utilized Yes 7/9/2017 12:00 PM   Criteria for Appropriate Use Limited/no vessel suitable for conventional peripheral access 7/9/2017 12:00 PM   Site Assessment Clean, dry, & intact 7/9/2017 12:00 PM   Infiltration Assessment 0 7/9/2017 12:00 PM   Affected Extremity/Extremities Color distal to insertion site pink (or appropriate for race) 7/9/2017 12:00 PM   Date of Last Dressing Change 07/07/17 7/9/2017 12:00 PM   Dressing Status Clean, dry, & intact 7/9/2017 12:00 PM   Dressing Type Disk with Chlorhexadine gluconate (CHG); Transparent 7/9/2017 12:00 PM   Action Taken Open ports on tubing capped 7/9/2017 12:00 PM   Proximal Hub Color/Line Status White; Infusing 7/9/2017 12:00 PM   Positive Blood Return (Medial Site) Yes 7/9/2017 12:00 PM   Medial 1 Hub Color/Line Status Gray;Flushed;Capped 7/9/2017 12:00 PM   Positive Blood Return (Lateral Site) Yes 7/9/2017 12:00 PM   Medial 2 Hub Color/Line Status Blue;Capped;Flushed 7/9/2017 12:00 PM   Positive Blood Return (Site #3) Yes 7/9/2017 12:00 PM   Distal Hub Color/Line Status Brown; Infusing 7/9/2017 12:00 PM   Positive Blood Return (Site #4) Yes 7/9/2017 12:00 PM   Alcohol Cap Used Yes 7/9/2017 12:00 PM        Opportunity for questions and clarification was provided.       Patient transported with:   Registered Nurse  Tech

## 2017-07-09 NOTE — PROGRESS NOTES
0730: Bedside [facility] by Chanell Pelayo RN (offgoing nurse). Report included the following information SBAR, Kardex, ED Summary, OR Summary, Procedure Summary, Intake/Output, MAR, Accordion, Recent Results, Med Rec Status and Cardiac Rhythm Sinus tach/NSR.     1930: Bedside shift change report given to [address] (oncoming nurse) by Giuliano Randolph RN (offgoing nurse). Report included the following information SBAR, Kardex, ED Summary, OR Summary, Procedure Summary, Intake/Output, MAR, Accordion, Recent Results, Med Rec Status and Cardiac Rhythm NSR/sinus tach.

## 2017-07-09 NOTE — PROGRESS NOTES
Shift summary: Uneventful shift. PRN Dilaudid administered for breakthrough pain management. PRN Zofran and Compazine administered once each for nausea.

## 2017-07-09 NOTE — ROUTINE PROCESS
1930: Bedside and Verbal shift change report given to Alina Rod RN (oncoming nurse) by Hayden Hernandez RN (offgoing nurse). Report included the following information SBAR, Kardex, ED Summary, OR Summary, Procedure Summary, Intake/Output, MAR, Accordion, Recent Results, Med Rec Status, Cardiac Rhythm NSR-ST up to 120s with activity and Alarm Parameters . 0730: Bedside and Verbal shift change report given to Hayden Hernandez RN (oncoming nurse) by Alina Rod RN (offgoing nurse). Report included the following information SBAR, Kardex, Intake/Output, MAR, Accordion, Recent Results, Med Rec Status, Cardiac Rhythm NSR and Alarm Parameters .

## 2017-07-09 NOTE — PROGRESS NOTES
ID Progress Note  2017    Subjective:     Status post OR, having trouble with wound vac seal    Objective:     Antibiotics:  1. Vancomycin  2. Zosyn   3. Eraxis      Vitals:   Visit Vitals    BP 93/74    Pulse 85    Temp 99.2 °F (37.3 °C)    Resp 19    Ht 5' 4\" (1.626 m)    Wt 148 kg (326 lb 4.5 oz)    LMP 2017 (Approximate)    SpO2 98%    Breastfeeding No    BMI 56.01 kg/m2        Tmax:  Temp (24hrs), Av.7 °F (37.1 °C), Min:98.1 °F (36.7 °C), Max:99.2 °F (37.3 °C)      Exam:  Lungs:  clear to auscultation bilaterally  Heart:  regularly irregular rhythm  Abdomen:  abnormal findings:  tenderness moderate in the entire abdomen, hypoactive bowel sounds    Labs:      Recent Labs      17   0337  17   0442  17   0415   WBC   --   22.5*   --    HGB   --   9.6*   --    PLT   --   495*   --    BUN  31*  29*  30*  31*   CREA  1.36*  1.33*  1.35*  1.54*   SGOT   --   11*   --    AP   --   120*   --    TBILI   --   0.6   --        Cultures:     Lab Results   Component Value Date/Time    Specimen Description: URINE 2009 07:45 PM     Lab Results   Component Value Date/Time    Culture result: NO GROWTH ON SOLID MEDIA AT 4 DAYS 2017 12:17 PM    Culture result:  2017 12:17 PM     **METHICILLIN RESISTANT STAPHYLOCOCCUS AUREUS**  ISOLATED FROM THIO BROTH ONLY      Culture result: NO GROWTH 5 DAYS 2017 02:35 PM       Radiology:     Line/Insert Date:           Assessment:     1. Ischemic gut with frozen abdomen  2. Leukocytosis--back up after surgery  3. Cultures negative to date    Objective:     1. Continue IV therapy  2. Follow up cultures and studies  3. Work up fevers  4.  Labs pending, apparently--monitor wbc    Chris Peralta MD

## 2017-07-09 NOTE — PROGRESS NOTES
Subjective  No new complaints  Eating a lot of ice chips  NGT is clear. Temp:  [97.8 °F (36.6 °C)-99.8 °F (37.7 °C)]   Pulse (Heart Rate):  []   BP: ()/()   Resp Rate:  [7-28]   O2 Sat (%):  [87 %-100 %]   Weight:  [312 lb 2.7 oz (141.6 kg)-326 lb 4.5 oz (148 kg)]   07/09 0701 - 07/09 1900  In: 469.2 [I.V.:469.2]  Out: 7267 [Urine:830; Drains:300]  07/07 1901 - 07/09 0700  In: 8663.6 [I.V.:8663.6]  Out: 6641 [Urine:5700; Drains:1335]      Objective  Gen- alert in NAD  Lungs CTA   H- RRR  ABD- vac in place.  Ostomy now functional  Recent Results (from the past 24 hour(s))   GLUCOSE, POC    Collection Time: 07/08/17  4:53 PM   Result Value Ref Range    Glucose (POC) 113 (H) 65 - 100 mg/dL    Performed by Maggy Ross, POC    Collection Time: 07/09/17 12:30 AM   Result Value Ref Range    Glucose (POC) 199 (H) 65 - 100 mg/dL    Performed by Ripley Cushing    MAGNESIUM    Collection Time: 07/09/17  3:37 AM   Result Value Ref Range    Magnesium 2.2 1.6 - 2.4 mg/dL   PHOSPHORUS    Collection Time: 07/09/17  3:37 AM   Result Value Ref Range    Phosphorus 4.8 (H) 2.6 - 4.7 MG/DL   VANCOMYCIN, TROUGH    Collection Time: 07/09/17  3:37 AM   Result Value Ref Range    Vancomycin,trough 18.0 (H) 5.0 - 10.0 ug/mL    Reported dose date: 20170708      Reported dose time: 327      Reported dose: 1750 MG UNITS   METABOLIC PANEL, BASIC    Collection Time: 07/09/17  3:37 AM   Result Value Ref Range    Sodium 147 (H) 136 - 145 mmol/L    Potassium 4.8 3.5 - 5.1 mmol/L    Chloride 114 (H) 97 - 108 mmol/L    CO2 24 21 - 32 mmol/L    Anion gap 9 5 - 15 mmol/L    Glucose 166 (H) 65 - 100 mg/dL    BUN 31 (H) 6 - 20 MG/DL    Creatinine 1.36 (H) 0.55 - 1.02 MG/DL    BUN/Creatinine ratio 23 (H) 12 - 20      GFR est AA 52 (L) >60 ml/min/1.73m2    GFR est non-AA 43 (L) >60 ml/min/1.73m2    Calcium 8.6 8.5 - 10.1 MG/DL   GLUCOSE, POC    Collection Time: 07/09/17  6:46 AM   Result Value Ref Range    Glucose (POC) 152 (H) 65 - 100 mg/dL    Performed by Gina Grant    CBC WITH AUTOMATED DIFF    Collection Time: 07/09/17 11:23 AM   Result Value Ref Range    WBC 19.2 (H) 3.6 - 11.0 K/uL    RBC 3.11 (L) 3.80 - 5.20 M/uL    HGB 9.1 (L) 11.5 - 16.0 g/dL    HCT 28.5 (L) 35.0 - 47.0 %    MCV 91.6 80.0 - 99.0 FL    MCH 29.3 26.0 - 34.0 PG    MCHC 31.9 30.0 - 36.5 g/dL    RDW 15.9 (H) 11.5 - 14.5 %    PLATELET 296 (H) 463 - 400 K/uL    NEUTROPHILS 68 32 - 75 %    BAND NEUTROPHILS 1 0 - 6 %    LYMPHOCYTES 19 12 - 49 %    MONOCYTES 8 5 - 13 %    EOSINOPHILS 1 0 - 7 %    BASOPHILS 0 0 - 1 %    METAMYELOCYTES 2 (H) 0 %    MYELOCYTES 1 (H) 0 %    ABS. NEUTROPHILS 13.2 (H) 1.8 - 8.0 K/UL    ABS. LYMPHOCYTES 3.6 (H) 0.8 - 3.5 K/UL    ABS. MONOCYTES 1.5 (H) 0.0 - 1.0 K/UL    ABS. EOSINOPHILS 0.2 0.0 - 0.4 K/UL    ABS.  BASOPHILS 0.0 0.0 - 0.1 K/UL    DF MANUAL      PLATELET COMMENTS LARGE PLATELETS      RBC COMMENTS ANISOCYTOSIS  1+        RBC COMMENTS POLYCHROMASIA  PRESENT       METABOLIC PANEL, BASIC    Collection Time: 07/09/17 11:23 AM   Result Value Ref Range    Sodium 147 (H) 136 - 145 mmol/L    Potassium 4.6 3.5 - 5.1 mmol/L    Chloride 114 (H) 97 - 108 mmol/L    CO2 25 21 - 32 mmol/L    Anion gap 8 5 - 15 mmol/L    Glucose 99 65 - 100 mg/dL    BUN 31 (H) 6 - 20 MG/DL    Creatinine 1.33 (H) 0.55 - 1.02 MG/DL    BUN/Creatinine ratio 23 (H) 12 - 20      GFR est AA 54 (L) >60 ml/min/1.73m2    GFR est non-AA 44 (L) >60 ml/min/1.73m2    Calcium 8.7 8.5 - 10.1 MG/DL   MAGNESIUM    Collection Time: 07/09/17 11:23 AM   Result Value Ref Range    Magnesium 2.2 1.6 - 2.4 mg/dL   PHOSPHORUS    Collection Time: 07/09/17 11:23 AM   Result Value Ref Range    Phosphorus 5.4 (H) 2.6 - 4.7 MG/DL   NUCLEATED RBC    Collection Time: 07/09/17 11:23 AM   Result Value Ref Range    NRBC 0.1 (H) 0  WBC    ABSOLUTE NRBC 0.02 (H) 0.00 - 0.01 K/uL    WBC CORRECTED FOR NR ADJUSTED FOR NUCLEATED RBC'S     GLUCOSE, POC    Collection Time: 07/09/17 12:02 PM Result Value Ref Range    Glucose (POC) 108 (H) 65 - 100 mg/dL    Performed by Waqas Davis          Active Problems:    Abdominal pain (6/25/2017)      Small bowel ischemia ([address] (6/28/2017)      Overview: CT abd/pelvis 6/28/17      1. The stomach and proximal small bowel loops are distended. There is a       loop of      small bowel in the midabdomen which is mildly dilated and does not       demonstrate      enhancement in the wall and there is suspicion of pneumatosis and this       leads to      a loop of small bowel which demonstrates thickening of the wall and       findings are      suspicious for ischemia. 2. There is extensive mesenteric edema and a small amount of ascites in       the      pelvis. 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 There is nonocclusive thrombus in the SMA. 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 If this is the tube going distally then would start tube feeds  Continue TPN  OOB/Ambulate   Cont abx.

## 2017-07-10 ENCOUNTER — APPOINTMENT (OUTPATIENT)
Dept: GENERAL RADIOLOGY | Age: 42
DRG: 335 | End: 2017-07-10
Attending: SURGERY
Payer: MEDICARE

## 2017-07-10 LAB
ANION GAP BLD CALC-SCNC: 11 MMOL/L (ref 5–15)
BASOPHILS # BLD AUTO: 0 K/UL (ref 0–0.1)
BASOPHILS # BLD: 0 % (ref 0–1)
BUN SERPL-MCNC: 29 MG/DL (ref 6–20)
BUN/CREAT SERPL: 22 (ref 12–20)
CALCIUM SERPL-MCNC: 8.4 MG/DL (ref 8.5–10.1)
CHLORIDE SERPL-SCNC: 114 MMOL/L (ref 97–108)
CO2 SERPL-SCNC: 23 MMOL/L (ref 21–32)
CREAT SERPL-MCNC: 1.32 MG/DL (ref 0.55–1.02)
EOSINOPHIL # BLD: 0.4 K/UL (ref 0–0.4)
EOSINOPHIL NFR BLD: 2 % (ref 0–7)
ERYTHROCYTE [DISTWIDTH] IN BLOOD BY AUTOMATED COUNT: 16.1 % (ref 11.5–14.5)
GLUCOSE BLD STRIP.AUTO-MCNC: 100 MG/DL (ref 65–100)
GLUCOSE BLD STRIP.AUTO-MCNC: 100 MG/DL (ref 65–100)
GLUCOSE BLD STRIP.AUTO-MCNC: 167 MG/DL (ref 65–100)
GLUCOSE BLD STRIP.AUTO-MCNC: 174 MG/DL (ref 65–100)
GLUCOSE BLD STRIP.AUTO-MCNC: 96 MG/DL (ref 65–100)
GLUCOSE SERPL-MCNC: 125 MG/DL (ref 65–100)
HCT VFR BLD AUTO: 26 % (ref 35–47)
HGB BLD-MCNC: 8.1 G/DL (ref 11.5–16)
LYMPHOCYTES # BLD AUTO: 16 % (ref 12–49)
LYMPHOCYTES # BLD: 2.9 K/UL (ref 0.8–3.5)
MAGNESIUM SERPL-MCNC: 2 MG/DL (ref 1.6–2.4)
MCH RBC QN AUTO: 28.7 PG (ref 26–34)
MCHC RBC AUTO-ENTMCNC: 31.2 G/DL (ref 30–36.5)
MCV RBC AUTO: 92.2 FL (ref 80–99)
METAMYELOCYTES NFR BLD MANUAL: 1 %
MONOCYTES # BLD: 1.1 K/UL (ref 0–1)
MONOCYTES NFR BLD AUTO: 6 % (ref 5–13)
NEUTS SEG # BLD: 13.4 K/UL (ref 1.8–8)
NEUTS SEG NFR BLD AUTO: 75 % (ref 32–75)
PHOSPHATE SERPL-MCNC: 6.2 MG/DL (ref 2.6–4.7)
PLATELET # BLD AUTO: 455 K/UL (ref 150–400)
PLATELET COMMENTS,PCOM: ABNORMAL
POTASSIUM SERPL-SCNC: 4.1 MMOL/L (ref 3.5–5.1)
RBC # BLD AUTO: 2.82 M/UL (ref 3.8–5.2)
RBC MORPH BLD: ABNORMAL
RBC MORPH BLD: ABNORMAL
SERVICE CMNT-IMP: ABNORMAL
SERVICE CMNT-IMP: ABNORMAL
SERVICE CMNT-IMP: NORMAL
SODIUM SERPL-SCNC: 148 MMOL/L (ref 136–145)
WBC # BLD AUTO: 17.9 K/UL (ref 3.6–11)

## 2017-07-10 PROCEDURE — 74011250636 HC RX REV CODE- 250/636: Performed by: INTERNAL MEDICINE

## 2017-07-10 PROCEDURE — 65660000000 HC RM CCU STEPDOWN

## 2017-07-10 PROCEDURE — 74011000258 HC RX REV CODE- 258: Performed by: SURGERY

## 2017-07-10 PROCEDURE — 74011636637 HC RX REV CODE- 636/637: Performed by: SURGERY

## 2017-07-10 PROCEDURE — 36415 COLL VENOUS BLD VENIPUNCTURE: CPT | Performed by: SURGERY

## 2017-07-10 PROCEDURE — 74011000250 HC RX REV CODE- 250: Performed by: INTERNAL MEDICINE

## 2017-07-10 PROCEDURE — 74011250636 HC RX REV CODE- 250/636: Performed by: SURGERY

## 2017-07-10 PROCEDURE — C9113 INJ PANTOPRAZOLE SODIUM, VIA: HCPCS | Performed by: SURGERY

## 2017-07-10 PROCEDURE — 85025 COMPLETE CBC W/AUTO DIFF WBC: CPT | Performed by: SURGERY

## 2017-07-10 PROCEDURE — 3331090001 HH PPS REVENUE CREDIT

## 2017-07-10 PROCEDURE — 74011000250 HC RX REV CODE- 250: Performed by: SURGERY

## 2017-07-10 PROCEDURE — 74011250636 HC RX REV CODE- 250/636: Performed by: NURSE PRACTITIONER

## 2017-07-10 PROCEDURE — 74011000250 HC RX REV CODE- 250: Performed by: NURSE PRACTITIONER

## 2017-07-10 PROCEDURE — 74011250636 HC RX REV CODE- 250/636: Performed by: PHYSICAL MEDICINE & REHABILITATION

## 2017-07-10 PROCEDURE — 82962 GLUCOSE BLOOD TEST: CPT

## 2017-07-10 PROCEDURE — 83735 ASSAY OF MAGNESIUM: CPT | Performed by: SURGERY

## 2017-07-10 PROCEDURE — 84100 ASSAY OF PHOSPHORUS: CPT | Performed by: SURGERY

## 2017-07-10 PROCEDURE — 80048 BASIC METABOLIC PNL TOTAL CA: CPT | Performed by: SURGERY

## 2017-07-10 PROCEDURE — 76080 X-RAY EXAM OF FISTULA: CPT

## 2017-07-10 PROCEDURE — 3331090002 HH PPS REVENUE DEBIT

## 2017-07-10 PROCEDURE — 74011000258 HC RX REV CODE- 258: Performed by: INTERNAL MEDICINE

## 2017-07-10 RX ORDER — CLOPIDOGREL BISULFATE 75 MG/1
75 TABLET ORAL DAILY
Status: DISCONTINUED | OUTPATIENT
Start: 2017-07-11 | End: 2017-08-24 | Stop reason: HOSPADM

## 2017-07-10 RX ADMIN — SODIUM CHLORIDE 5 MG: 9 INJECTION INTRAMUSCULAR; INTRAVENOUS; SUBCUTANEOUS at 05:45

## 2017-07-10 RX ADMIN — CALCIUM GLUCONATE: 94 INJECTION, SOLUTION INTRAVENOUS at 19:21

## 2017-07-10 RX ADMIN — PIPERACILLIN SODIUM,TAZOBACTAM SODIUM 3.38 G: 3; .375 INJECTION, POWDER, FOR SOLUTION INTRAVENOUS at 05:38

## 2017-07-10 RX ADMIN — HYDROMORPHONE HYDROCHLORIDE 1 MG: 1 INJECTION, SOLUTION INTRAMUSCULAR; INTRAVENOUS; SUBCUTANEOUS at 12:33

## 2017-07-10 RX ADMIN — OCTREOTIDE ACETATE 50 MCG: 50 INJECTION, SOLUTION INTRAVENOUS; SUBCUTANEOUS at 10:03

## 2017-07-10 RX ADMIN — ONDANSETRON 8 MG: 2 INJECTION INTRAMUSCULAR; INTRAVENOUS at 03:25

## 2017-07-10 RX ADMIN — OCTREOTIDE ACETATE 50 MCG: 50 INJECTION, SOLUTION INTRAVENOUS; SUBCUTANEOUS at 17:31

## 2017-07-10 RX ADMIN — OCTREOTIDE ACETATE 50 MCG: 50 INJECTION, SOLUTION INTRAVENOUS; SUBCUTANEOUS at 21:25

## 2017-07-10 RX ADMIN — HYDROMORPHONE HYDROCHLORIDE 1 MG: 1 INJECTION, SOLUTION INTRAMUSCULAR; INTRAVENOUS; SUBCUTANEOUS at 09:47

## 2017-07-10 RX ADMIN — Medication 10 ML: at 05:46

## 2017-07-10 RX ADMIN — SODIUM CHLORIDE 100 MG: 900 INJECTION, SOLUTION INTRAVENOUS at 17:31

## 2017-07-10 RX ADMIN — HYDROMORPHONE HYDROCHLORIDE 1 MG: 1 INJECTION, SOLUTION INTRAMUSCULAR; INTRAVENOUS; SUBCUTANEOUS at 15:08

## 2017-07-10 RX ADMIN — VANCOMYCIN HYDROCHLORIDE 1500 MG: 10 INJECTION, POWDER, LYOPHILIZED, FOR SOLUTION INTRAVENOUS at 02:36

## 2017-07-10 RX ADMIN — HYDROMORPHONE HYDROCHLORIDE 1 MG: 1 INJECTION, SOLUTION INTRAMUSCULAR; INTRAVENOUS; SUBCUTANEOUS at 17:31

## 2017-07-10 RX ADMIN — PIPERACILLIN SODIUM,TAZOBACTAM SODIUM 3.38 G: 3; .375 INJECTION, POWDER, FOR SOLUTION INTRAVENOUS at 15:07

## 2017-07-10 RX ADMIN — Medication 10 ML: at 15:11

## 2017-07-10 RX ADMIN — Medication 20 ML: at 21:23

## 2017-07-10 RX ADMIN — Medication 1 MG: at 21:23

## 2017-07-10 RX ADMIN — Medication: at 09:49

## 2017-07-10 RX ADMIN — HUMAN INSULIN 2 UNITS: 100 INJECTION, SOLUTION SUBCUTANEOUS at 05:53

## 2017-07-10 RX ADMIN — SODIUM CHLORIDE, SODIUM LACTATE, POTASSIUM CHLORIDE, AND CALCIUM CHLORIDE 100 ML/HR: 600; 310; 30; 20 INJECTION, SOLUTION INTRAVENOUS at 11:32

## 2017-07-10 RX ADMIN — PIPERACILLIN SODIUM,TAZOBACTAM SODIUM 3.38 G: 3; .375 INJECTION, POWDER, FOR SOLUTION INTRAVENOUS at 21:16

## 2017-07-10 RX ADMIN — SODIUM CHLORIDE 5 MG: 9 INJECTION INTRAMUSCULAR; INTRAVENOUS; SUBCUTANEOUS at 12:35

## 2017-07-10 RX ADMIN — SODIUM CHLORIDE 40 MG: 9 INJECTION INTRAMUSCULAR; INTRAVENOUS; SUBCUTANEOUS at 09:47

## 2017-07-10 RX ADMIN — HYDROMORPHONE HYDROCHLORIDE 1 MG: 1 INJECTION, SOLUTION INTRAMUSCULAR; INTRAVENOUS; SUBCUTANEOUS at 02:48

## 2017-07-10 RX ADMIN — Medication: at 23:56

## 2017-07-10 RX ADMIN — Medication 1 MG: at 05:46

## 2017-07-10 RX ADMIN — HUMAN INSULIN 2 UNITS: 100 INJECTION, SOLUTION SUBCUTANEOUS at 01:30

## 2017-07-10 RX ADMIN — I.V. FAT EMULSION 500 ML: 20 EMULSION INTRAVENOUS at 19:12

## 2017-07-10 RX ADMIN — Medication 1 MG: at 15:08

## 2017-07-10 RX ADMIN — ONDANSETRON 8 MG: 2 INJECTION INTRAMUSCULAR; INTRAVENOUS at 17:30

## 2017-07-10 RX ADMIN — Medication 10 ML: at 15:10

## 2017-07-10 NOTE — PROGRESS NOTES
ID Progress Note  7/10/2017    Subjective:     Status post OR, having trouble with wound vac seal    Objective:     Antibiotics:  1. Vancomycin  2. Zosyn   3. Eraxis      Vitals:   Visit Vitals    /62 (BP 1 Location: Right arm, BP Patient Position: At rest)    Pulse (!) 103    Temp 98.2 °F (36.8 °C)    Resp 18    Ht 5' 4\" (1.626 m)    Wt 155.3 kg (342 lb 6 oz)    LMP 2017 (Approximate)    SpO2 98%    Breastfeeding No    BMI 58.77 kg/m2        Tmax:  Temp (24hrs), Av.4 °F (36.9 °C), Min:98.1 °F (36.7 °C), Max:98.6 °F (37 °C)      Exam:  Lungs:  clear to auscultation bilaterally  Heart:  regularly irregular rhythm  Abdomen:  abnormal findings:  tenderness moderate in the entire abdomen, hypoactive bowel sounds    Labs:      Recent Labs      07/10/17   0230  17   1123  17   0337  17   0442   WBC  17.9*  19.2*   --   22.5*   HGB  8.1*  9.1*   --   9.6*   PLT  455*  511*   --   495*   BUN  29*  31*  31*  29*  30*   CREA  1.32*  1.33*  1.36*  1.33*  1.35*   SGOT   --    --    --   11*   AP   --    --    --   120*   TBILI   --    --    --   0.6       Cultures:     Lab Results   Component Value Date/Time    Specimen Description: URINE 2009 07:45 PM     Lab Results   Component Value Date/Time    Culture result: NO GROWTH ON SOLID MEDIA AT 4 DAYS 2017 12:17 PM    Culture result:  2017 12:17 PM     **METHICILLIN RESISTANT STAPHYLOCOCCUS AUREUS**  ISOLATED FROM THIO BROTH ONLY      Culture result: NO GROWTH 5 DAYS 2017 02:35 PM       Radiology:     Line/Insert Date:           Assessment:     1. Ischemic gut with frozen abdomen  2. Leukocytosis--back up after surgery--trending back down  3. Cultures negative to date    Objective:     1. Continue IV therapy  2. Follow up cultures and studies  3.  Work up fevers    Chris Peralta MD

## 2017-07-10 NOTE — PROGRESS NOTES
Palliative Medicine Consult  [facility]: [phone] ([phone])    Patient Name: Yessenia Antonio  YOB: 1975    Date of Initial Consult: 6/27/17  Reason for Consult: overwhelming symptoms  Requesting Provider: Daniel Redding NP  Primary Care Physician: Nan Moritz, MD      SUMMARY:   Yessenia Antonio is a 46 y.o. with a past history of Crohn's disease,anxiety,  chronic pain, hx DVT, multiple (4) small bowel resections, s/p recent urgent dx lap, with lysis of adhesions, repair of suspected perf 6/7/17 , who was admitted on 6/25/2017 from home with a diagnosis of worsening abdominal pain, elevated WBC and MRSA wound infection. Initially consulted for pain management thought to be possible Crohn's flare. Initial CT on 6/25 w/out acute findings but repeat on 6/28 showing ischemic bowl. S/p ex lap but ischemic bowl unresectable, s/p SMA stent. Extubated on 6/30. Most recently s/p ex lap, KATHRINE, feeding tube, fistula exclusion and wound vac placement for EC fistula. Patient is followed chronically for pain management by Dr. Robin Colon at [address] [place] [address]. Home regimen for chronic abdominal pain is MS Contin 60 Q8 and MS IR 30 Q6 PRN. She also takes Xanax 1mg TID prn anxiety.  reviewed, opioids last prescribed on 5/19/17. PALLIATIVE DIAGNOSES:     1. Abd pain, acute on chronic   2. Anxiety, acute on chronic   3. Nausea  4. Crohn's disease  5. Chronic constipation (at home on Relistor)  6. MRSA wound        PLAN:   1. Recommend continuing Morphine PCA 4mg/h basal, 5mg every 10 min demand. Seems to control the pain to some degree and w/out confusion or drowsiness (this has been a problem before). 2. Cont prn Dilaudid 1mg IV every 1h prn pain that is not controlled by PCA- hortencia before any dressing changes. 3. Pt feels that Morphine works better than Dilaudid (at equivalent doses).    4. Pt remains NPO except ice chips on TPN- when can take po medications will help restart home MSContin back. If pt remains NPO and starts on TFs, will need to talk w/ surgery if pt can absorb MSContin- as this cannot be crushed into tube. May need to explore Fentanyl patch w/ outpatient physician who manages pain meds. 5. Bowel regimen per Surgery team- has chronic constipation now on PCA. 6. Psychosocial support- pt suffering from loss of function and identity. 7. Communicated plan of care with: Palliative IDT; have been in communication w/ Dr Declan Nunes. GOALS OF CARE / TREATMENT PREFERENCES:   [====Goals of Care====]  GOALS OF CARE:  Patient / health care proxy stated goals: pain control  Recovery from acute issues      TREATMENT PREFERENCES:   Code Status: Full Code    Advance Care Planning:  Advance Care Planning 6/26/2017   Patient's Healthcare Decision Maker is: Legal Next of Camilo 71   Primary Decision Maker Name Garrett Shoemaker   Primary Decision Maker Phone Number [phone]   Primary Decision Maker Relationship to Patient Parent   Confirm Advance Directive None   Patient Would Like to Complete Advance Directive No       Other:    The palliative care team has discussed with patient / health care proxy about goals of care / treatment preferences for patient.  [====Goals of Care====]         HISTORY:         HPI/SUBJECTIVE:    The patient is:   [x] Verbal and participatory  [] Non-participatory due to: Medical condition     Pt pressing Morphine PCA about 2-5x an hour she states, but PCA just cleared. No confusion or sedation noted. Has used 8 doses of Dilaudid 1mg IV prn over past 24h.      Clinical Pain Assessment (nonverbal scale for severity on nonverbal patients):   [++++ Clinical Pain Assessment++++]  [++++Pain Severity++++]: Pain: 6  [++++Pain Character++++]: sharp and burning, stabbing  [++++Pain Duration++++]: several days  [++++Pain Effect++++]: hard to walk, feeling anxious  [++++Pain Factors++++]: better after pain medicaion  [++++Pain Frequency++++]: constant  [++++Pain Location++++]: across abdomen both sides in middle at incision site  [++++ Clinical Pain Assessment++++]     FUNCTIONAL ASSESSMENT:     Palliative Performance Scale (PPS):  PPS: 50       PSYCHOSOCIAL/SPIRITUAL SCREENING:     Advance Care Planning:  Advance Care Planning 6/26/2017   Patient's Healthcare Decision Maker is: Legal Next of Camilo 71   Primary Decision Maker Name Tresa Fragoso   Primary Decision Maker Phone Number [phone]   Primary Decision Maker Relationship to Patient Parent   Confirm Advance Directive None   Patient Would Like to Complete Advance Directive No        Any spiritual / Evangelical concerns:  [] Yes /  [x] No    Caregiver Burnout:  [] Yes /  [x] No /  [] No Caregiver Present      Anticipatory grief assessment:   [x] Normal  / [] Maladaptive       ESAS Anxiety: Anxiety: 0    ESAS Depression: Depression: 0        REVIEW OF SYSTEMS:     Positive and pertinent negative findings in ROS are noted above in HPI. The following systems were [x] reviewed / [] unable to be reviewed as noted in HPI  Other findings are noted below. Systems: constitutional, ears/nose/mouth/throat, respiratory, gastrointestinal, genitourinary, musculoskeletal, integumentary, neurologic, psychiatric, endocrine. Positive findings noted below. Modified ESAS Completed by: provider   Fatigue: 6 Drowsiness: 0   Depression: 0 Pain: 6   Anxiety: 0 Nausea: 0   Anorexia: 0 Dyspnea: 0     Constipation: No              PHYSICAL EXAM:     From RN flowsheet:  Wt Readings from Last 3 Encounters:   07/10/17 342 lb 6 oz (155.3 kg)   06/25/17 343 lb (155.6 kg)   06/22/17 343 lb (155.6 kg)     Blood pressure 111/62, pulse (!) 103, temperature 98.2 °F (36.8 °C), resp. rate 18, height 5' 4\" (1.626 m), weight 342 lb 6 oz (155.3 kg), last menstrual period 05/21/2017, SpO2 98 %, not currently breastfeeding.     Pain Scale 1: Numeric (0 - 10)  Pain Intensity 1: 7  Pain Onset 1: post op  Pain Location 1: Abdomen  Pain Orientation 1: Anterior  Pain Description 1: Aching  Pain Intervention(s) 1: Medication (see MAR)  Last bowel movement, if known: stool in ostomy     Constitutional:awake, alert, oriented, tearful and axious   Eyes: pupils equal, anicteric  ENMT: no nasal discharge, moist mucous membranes  Cardiac: regular rhythm   Respiratory: breathing not labored  Gastrointestinal: midline incision w/ wound vac, b/l wounds. Hypoactive bowel sounds   Musculoskeletal: no deformity, no tenderness to palpation  Skin: warm, dry  No edema  Neurologic: moving all extremities  Psych: anxious      HISTORY:     Active Problems:    Abdominal pain (6/25/2017)      Small bowel ischemia ([address] (6/28/2017)      Overview: CT abd/pelvis 6/28/17      1. The stomach and proximal small bowel loops are distended. There is a       loop of      small bowel in the midabdomen which is mildly dilated and does not       demonstrate      enhancement in the wall and there is suspicion of pneumatosis and this       leads to      a loop of small bowel which demonstrates thickening of the wall and       findings are      suspicious for ischemia. 2. There is extensive mesenteric edema and a small amount of ascites in       the      pelvis. 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 There is nonocclusive thrombus in the SMA.             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 Ryan Cruz      Family History   Problem Relation Age of Onset    Hypertension Father     Stroke Father     Other Father      arthritis History reviewed, no pertinent family history.   Social History   Substance Use Topics    Smoking status: Former Smoker    Smokeless tobacco: Not on file    Alcohol use No     Allergies   Allergen Reactions    Demerol [Meperidine] Unknown (comments)    Soma [Carisoprodol] Rash and Nausea Only    Sulfa (Sulfonamide Antibiotics) Unknown (comments)    Toradol [Ketorolac Tromethamine] Unknown (comments)      Current Facility-Administered Medications   Medication Dose Route Frequency    iohexol (OMNIPAQUE) solution 40 mL  40 mL Intrathecal RAD ONCE    TPN ADULT - CENTRAL   IntraVENous TITRATE    vancomycin (VANCOCIN) 1500 mg in  ml infusion  1,500 mg IntraVENous Q24H    alteplase (CATHFLO) 1 mg in sterile water (preservative free) 1 mL injection  1 mg InterCATHeter PRN    sodium chloride (NS) flush 10 mL  10 mL InterCATHeter Q24H    sodium chloride (NS) flush 10 mL  10 mL InterCATHeter PRN    sodium chloride (NS) flush 10-40 mL  10-40 mL InterCATHeter Q8H    alteplase (CATHFLO) 1 mg in sterile water (preservative free) 1 mL injection  1 mg InterCATHeter PRN    bacitracin 500 unit/gram packet 1 Packet  1 Packet Topical PRN    0.9% sodium chloride infusion 250 mL  250 mL IntraVENous PRN    lactated Ringers infusion  100 mL/hr IntraVENous CONTINUOUS    fat emulsion 20% (LIPOSYN, INTRALIPID) infusion 500 mL  500 mL IntraVENous Q MON, WED & FRI    LORazepam (ATIVAN) injection 1 mg  1 mg IntraVENous Q8H    HYDROmorphone (PF) (DILAUDID) injection 1 mg  1 mg IntraVENous Q1H PRN    aspirin chewable tablet 81 mg  81 mg Oral DAILY    octreotide (SANDOSTATIN) injection 50 mcg  50 mcg IntraVENous TID    insulin regular (NOVOLIN R, HUMULIN R) injection   SubCUTAneous Q6H    morphine (PF)  mg/30 ml   IntraVENous CONTINUOUS    glucose chewable tablet 16 g  4 Tab Oral PRN    glucagon (GLUCAGEN) injection 1 mg  1 mg IntraMUSCular PRN    dextrose 10 % infusion 125-250 mL  125-250 mL IntraVENous PRN  pantoprazole (PROTONIX) 40 mg in sodium chloride 0.9 % 10 mL injection  40 mg IntraVENous DAILY    albuterol-ipratropium (DUO-NEB) 2.5 MG-0.5 MG/3 ML  3 mL Nebulization Q6H PRN    prochlorperazine (COMPAZINE) with saline injection 5 mg  5 mg IntraVENous Q6H PRN    anidulafungin (ERAXIS) 100 mg in 0.9% sodium chloride 130 mL IVPB  100 mg IntraVENous Q24H    ondansetron (ZOFRAN) injection 8 mg  8 mg IntraVENous Q6H PRN    sodium chloride (NS) flush 5-10 mL  5-10 mL IntraVENous Q8H    sodium chloride (NS) flush 5-10 mL  5-10 mL IntraVENous PRN    naloxone (NARCAN) injection 0.4 mg  0.4 mg IntraVENous PRN    diphenhydrAMINE (BENADRYL) injection 12.5 mg  12.5 mg IntraVENous Q4H PRN    enoxaparin (LOVENOX) injection 40 mg  40 mg SubCUTAneous Q24H    piperacillin-tazobactam (ZOSYN) 3.375 g in 0.9% sodium chloride (MBP/ADV) 100 mL  3.375 g IntraVENous Q8H    Vancomycin ---Pharmacy to Dose   Other Rx Dosing/Monitoring          LAB AND IMAGING FINDINGS:     Lab Results   Component Value Date/Time    WBC 17.9 07/10/2017 02:30 AM    HGB 8.1 07/10/2017 02:30 AM    PLATELET 513 92/46/8575 02:30 AM     Lab Results   Component Value Date/Time    Sodium 148 07/10/2017 02:30 AM    Potassium 4.1 07/10/2017 02:30 AM    Chloride 114 07/10/2017 02:30 AM    CO2 23 07/10/2017 02:30 AM    BUN 29 07/10/2017 02:30 AM    Creatinine 1.32 07/10/2017 02:30 AM    Calcium 8.4 07/10/2017 02:30 AM    Magnesium 2.0 07/10/2017 02:30 AM    Phosphorus 6.2 07/10/2017 02:30 AM      Lab Results   Component Value Date/Time    AST (SGOT) 11 07/08/2017 04:42 AM    Alk.  phosphatase 120 07/08/2017 04:42 AM    Protein, total 7.0 07/08/2017 04:42 AM    Albumin 1.5 07/08/2017 04:42 AM    Globulin 5.5 07/08/2017 04:42 AM     No results found for: INR, PTMR, PTP, PT1, PT2, APTT   No results found for: IRON, FE, TIBC, IBCT, PSAT, FERR   No results found for: PH, PCO2, PO2  No components found for: GLPOC   No results found for: CPK, CKMB Total time:   Counseling / coordination time, spent as noted above:    > 50% counseling / coordination?:     Prolonged service was provided for  []30 min   []75 min in face to face time in the presence of the patient, spent as noted above. Time Start:   Time End:   Note: this can only be billed with 58013 (initial) or 87199 (follow up). If multiple start / stop times, list each separately.

## 2017-07-10 NOTE — PROGRESS NOTES
NUTRITION       Recommendations:  1. Continue TPN at goal  2. If/when feedings started, would recommend initiating Vital 1.5 (peptide-based formula) @ 10 mL/hr and increase 10 mL/hr q 6 hours to goal rate of 70 mL/hr + 100 mL flush q 4 hours. Brief follow up. Chart reviewed. Noted pt s/p exploratory lap with lysis of adhesions, fistula exclusion and placement of proximal and distal tubes as well as wound vac on 7/7/17. Tube feedings have not been started and pt remains on TPN as sole source of nutrition support. Lipids added to TPN regimen on 7/7/17 as well. If feedings started, above goal would provide 2520 kcal, 113 g protein per day. TPN: 5%AA D20 @ 85 mL/hr x 1 hour    @ 170 mL/hr x 12 hours    @ 85 mL/hr x last hour    + MVI, thiamine (100 mg), trace elements (3x/week), 14 units insulin/L    + 20% lipids, 500 mL 3x/week  -- This provides a daily average of 2374 kcal, 111 g protein in 2210 mL-- meeting 94% and 100% of estimated kcal and protein needs, respectively. Last 3 Recorded Weights in this Encounter    07/08/17 0400 07/09/17 0500 07/10/17 0021   Weight: 147.5 kg (325 lb 2.9 oz) 148 kg (326 lb 4.5 oz) 155.3 kg (342 lb 6 oz)     Estimated Nutrition Needs:   Kcals/day: 1496 Kcals/day (1412-3872 kcal/day ([place] Jeor x 1.2-1.3))  Protein: 110 g (2.0g/kg IBW)  Fluid:  (1  ml/kcal)     Based On: [place] Jeor  Weight Used: Actual wt (143.4 kg (wt on admit))    RD to follow.     [address]

## 2017-07-10 NOTE — PROGRESS NOTES
[address]  [address], [address]       GI PROGRESS NOTE  [facility]  [phone]    NAME: David Otoole   :  1975   MRN:  [id number]       Subjective:     Doing ok    Objective:     VITALS:   Last 24hrs VS reviewed since prior progress note. Most recent are:  Visit Vitals    /62 (BP 1 Location: Right arm, BP Patient Position: At rest)    Pulse (!) 103    Temp 98.2 °F (36.8 °C)    Resp 18    Ht 5' 4\" (1.626 m)    Wt 155.3 kg (342 lb 6 oz)    SpO2 98%    Breastfeeding No    BMI 58.77 kg/m2       PHYSICAL EXAM:  General: Cooperative, no acute distress    Neurologic:  Alert and oriented X 3. HEENT: Dry red tongue  Lungs:  No Wheezing  Heart:  s1 s2  Abdomen: Soft, Non distended, multiple drains and vac  Extremities: edema  Psych:   Good insight. Not anxious nor agitated.     Lab Data Reviewed:     Recent Results (from the past 24 hour(s))   GLUCOSE, POC    Collection Time: 17 12:02 PM   Result Value Ref Range    Glucose (POC) 108 (H) 65 - 100 mg/dL    Performed by [address] [place] [address], POC    Collection Time: 17  5:38 PM   Result Value Ref Range    Glucose (POC) 97 65 - 100 mg/dL    Performed by Marita Fuentes    GLUCOSE, POC    Collection Time: 07/10/17 12:25 AM   Result Value Ref Range    Glucose (POC) 174 (H) 65 - 100 mg/dL    Performed by [address] [place] [address]    Collection Time: 07/10/17  2:30 AM   Result Value Ref Range    Magnesium 2.0 1.6 - 2.4 mg/dL   PHOSPHORUS    Collection Time: 07/10/17  2:30 AM   Result Value Ref Range    Phosphorus 6.2 (H) 2.6 - 4.7 MG/DL   METABOLIC PANEL, BASIC    Collection Time: 07/10/17  2:30 AM   Result Value Ref Range    Sodium 148 (H) 136 - 145 mmol/L    Potassium 4.1 3.5 - 5.1 mmol/L    Chloride 114 (H) 97 - 108 mmol/L    CO2 23 21 - 32 mmol/L    Anion gap 11 5 - 15 mmol/L    Glucose 125 (H) 65 - 100 mg/dL    BUN 29 (H) 6 - 20 MG/DL    Creatinine 1.32 (H) 0.55 - 1.02 MG/DL BUN/Creatinine ratio 22 (H) 12 - 20      GFR est AA 54 (L) >60 ml/min/1.73m2    GFR est non-AA 45 (L) >60 ml/min/1.73m2    Calcium 8.4 (L) 8.5 - 10.1 MG/DL   CBC WITH AUTOMATED DIFF    Collection Time: 07/10/17  2:30 AM   Result Value Ref Range    WBC 17.9 (H) 3.6 - 11.0 K/uL    RBC 2.82 (L) 3.80 - 5.20 M/uL    HGB 8.1 (L) 11.5 - 16.0 g/dL    HCT 26.0 (L) 35.0 - 47.0 %    MCV 92.2 80.0 - 99.0 FL    MCH 28.7 26.0 - 34.0 PG    MCHC 31.2 30.0 - 36.5 g/dL    RDW 16.1 (H) 11.5 - 14.5 %    PLATELET 295 (H) 023 - 400 K/uL    NEUTROPHILS 75 32 - 75 %    LYMPHOCYTES 16 12 - 49 %    MONOCYTES 6 5 - 13 %    EOSINOPHILS 2 0 - 7 %    BASOPHILS 0 0 - 1 %    METAMYELOCYTES 1 (H) 0 %    ABS. NEUTROPHILS 13.4 (H) 1.8 - 8.0 K/UL    ABS. LYMPHOCYTES 2.9 0.8 - 3.5 K/UL    ABS. MONOCYTES 1.1 (H) 0.0 - 1.0 K/UL    ABS. EOSINOPHILS 0.4 0.0 - 0.4 K/UL    ABS.  BASOPHILS 0.0 0.0 - 0.1 K/UL    PLATELET COMMENTS LARGE PLATELETS      RBC COMMENTS POLYCHROMASIA  1+        RBC COMMENTS ANISOCYTOSIS  1+       GLUCOSE, POC    Collection Time: 07/10/17  5:48 AM   Result Value Ref Range    Glucose (POC) 167 (H) 65 - 100 mg/dL    Performed by [address], POC    Collection Time: 07/10/17 11:09 AM   Result Value Ref Range    Glucose (POC) 100 65 - 100 mg/dL    Performed by Ever Franco          ________________________________________________________________________       Assessment:   · Ischemic Bowel s/p SMA stenting  · Crohn's disease s/p ex lap  · EC fistula     Patient Active Problem List   Diagnosis Code    Crohn's disease ([address] K50.90    DVT (deep venous thrombosis) ([address] I82.409    Incarcerated ventral hernia K46.0    Right flank pain R10.9    Perforated bowel ([address] K63.1    Abdominal pain R10.9    Small bowel ischemia ([address] K55.9    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 Continue supportive measures. Will follow. Cain Travis MD

## 2017-07-10 NOTE — PROGRESS NOTES
[facility] General Surgery    POD #3    Subjective     NPO, NGT out, TPN, NPO, WV in place, tube draining, woodruff, just came back from tube studies    Objective     Patient Vitals for the past 24 hrs:   Temp Pulse Resp BP SpO2   07/10/17 0725 98.6 °F (37 °C) 97 18 122/84 98 %   07/10/17 0328 98.1 °F (36.7 °C) 98 18 - 100 %   07/10/17 0021 98.4 °F (36.9 °C) 96 16 147/47 99 %   07/09/17 2000 98.6 °F (37 °C) (!) 104 16 147/86 100 %   07/09/17 1647 98.5 °F (36.9 °C) 97 14 120/68 99 %   07/09/17 1400 - (!) 103 16 (!) 129/96 98 %   07/09/17 1300 - 89 13 111/78 97 %   07/09/17 1200 98.8 °F (37.1 °C) 99 15 109/68 97 %   07/09/17 1100 - (!) 108 16 111/90 100 %         Date 07/09/17 0700 - 07/10/17 0659 07/10/17 0700 - 07/11/17 0659   Shift 1937-2062 5198-5298 24 Hour Total 6735-0125 9995-0474 24 Hour Total   I  N  T  A  K  E   I.V.  (mL/kg/hr) 1047.5  (0.6) 1421.9  (0.8) 2469.4  (0.7)         Volume (lactated Ringers infusion) 774.7 756. 7 1531.3         Volume (piperacillin-tazobactam (ZOSYN) 3.375 g in 0.9% sodium chloride (MBP/ADV) 100 mL) 100 100 200         Volume (TPN ADULT - CENTRAL) 172.8  172.8         Volume (TPN ADULT - CENTRAL) 0 565.3 565.3       Other 0 0 0         Intake (ml) (Vacuum Assisted Closure Anterior;Mid Abdominal) 0 0 0       Shift Total  (mL/kg) 1047.5  (7.1) 1421.9  (9.2) 2469.4  (15.9)      O  U  T  P  U  T   Urine  (mL/kg/hr) 1480  (0.8) 1600  (0.9) 3080  (0.8)         Urine Output (mL) (Urinary Catheter 07/07/17 2- way; Woodruff) 1480 1600 3080       Emesis/NG output 225  225         Output (ml) ([REMOVED] Nasogastric Tube 07/07/17) 225  225       Drains          Output (ml) (Vacuum Assisted Closure Anterior;Mid Abdominal) 0 0 0         Output (ml) (Malecot Drain (Abdominal Drainage) 07/07/17) 400 450 850         Output (ml) (Malecot Drain (Abdominal Drainage) 07/07/17) 0  0         Output (ml) Erla Paul Drain 07/07/17 Right Abdomen) 50 100 150       Stool 0  0         Output (ml) (Ileostomy 06/07/00 Lower  Abdomen) 0  0       Shift Total  (mL/kg) 2155  (14.6) 2150  (13.8) 4305  (27.7)      NET -1107.5 -728.1 -1835.6      Weight (kg) 148 155. 3 155. 3 155. 3 155. 3 155.3       PE  Pulm - CTAB  CV - RRR  Abd - soft, ND, BS present, WV in place, L drain with bilious contents, R drain with minimal output    Labs  Recent Results (from the past 12 hour(s))   GLUCOSE, POC    Collection Time: 07/10/17 12:25 AM   Result Value Ref Range    Glucose (POC) 174 (H) 65 - 100 mg/dL    Performed by [address] [place] [address] Teamly Harwood    Collection Time: 07/10/17  2:30 AM   Result Value Ref Range    Magnesium 2.0 1.6 - 2.4 mg/dL   PHOSPHORUS    Collection Time: 07/10/17  2:30 AM   Result Value Ref Range    Phosphorus 6.2 (H) 2.6 - 4.7 MG/DL   METABOLIC PANEL, BASIC    Collection Time: 07/10/17  2:30 AM   Result Value Ref Range    Sodium 148 (H) 136 - 145 mmol/L    Potassium 4.1 3.5 - 5.1 mmol/L    Chloride 114 (H) 97 - 108 mmol/L    CO2 23 21 - 32 mmol/L    Anion gap 11 5 - 15 mmol/L    Glucose 125 (H) 65 - 100 mg/dL    BUN 29 (H) 6 - 20 MG/DL    Creatinine 1.32 (H) 0.55 - 1.02 MG/DL    BUN/Creatinine ratio 22 (H) 12 - 20      GFR est AA 54 (L) >60 ml/min/1.73m2    GFR est non-AA 45 (L) >60 ml/min/1.73m2    Calcium 8.4 (L) 8.5 - 10.1 MG/DL   CBC WITH AUTOMATED DIFF    Collection Time: 07/10/17  2:30 AM   Result Value Ref Range    WBC 17.9 (H) 3.6 - 11.0 K/uL    RBC 2.82 (L) 3.80 - 5.20 M/uL    HGB 8.1 (L) 11.5 - 16.0 g/dL    HCT 26.0 (L) 35.0 - 47.0 %    MCV 92.2 80.0 - 99.0 FL    MCH 28.7 26.0 - 34.0 PG    MCHC 31.2 30.0 - 36.5 g/dL    RDW 16.1 (H) 11.5 - 14.5 %    PLATELET 121 (H) 260 - 400 K/uL    NEUTROPHILS 75 32 - 75 %    LYMPHOCYTES 16 12 - 49 %    MONOCYTES 6 5 - 13 %    EOSINOPHILS 2 0 - 7 %    BASOPHILS 0 0 - 1 %    METAMYELOCYTES 1 (H) 0 %    ABS. NEUTROPHILS 13.4 (H) 1.8 - 8.0 K/UL    ABS. LYMPHOCYTES 2.9 0.8 - 3.5 K/UL    ABS.  MONOCYTES 1.1 (H) 0.0 - 1.0 K/UL ABS. EOSINOPHILS 0.4 0.0 - 0.4 K/UL    ABS. BASOPHILS 0.0 0.0 - 0.1 K/UL    PLATELET COMMENTS LARGE PLATELETS      RBC COMMENTS POLYCHROMASIA  1+        RBC COMMENTS ANISOCYTOSIS  1+       GLUCOSE, POC    Collection Time: 07/10/17  5:48 AM   Result Value Ref Range    Glucose (POC) 167 (H) 65 - 100 mg/dL    Performed by [facility] Zaida Roberson is a 46 y. o.yr old female s/p EXPLORATORY LAPAROTOMY WITH LYSIS OF ADHESIONS FOR 2 HOURS, FISTULA EXCLUSION, FEEDING TUBE PLACEMENT, WOUND VAC PLACEMENT    Plan     TPN renewed, staying NPO for now  Tube study results pending, seems likely the R tube is distal and L is proximal based on the output on the L being much higher and the Xrays showing filling of what appears to be distal small bowel in the R tube, keeping both to drainage  WBC decreasing some  Cont on IV abx  OOB  Continue woodruff for now    Brandon Ravi MD

## 2017-07-10 NOTE — PROGRESS NOTES
Bedside shift change report given to April (oncoming nurse) by Derrick Meza [id number] (offgoing nurse).  Report included the following information SBAR, Intake/Output, Recent Results and Cardiac Rhythm NSR-ST.

## 2017-07-11 ENCOUNTER — APPOINTMENT (OUTPATIENT)
Dept: GENERAL RADIOLOGY | Age: 42
DRG: 335 | End: 2017-07-11
Attending: ANESTHESIOLOGY
Payer: MEDICARE

## 2017-07-11 LAB
ABO + RH BLD: NORMAL
ANION GAP BLD CALC-SCNC: 9 MMOL/L (ref 5–15)
BASOPHILS # BLD AUTO: 0 K/UL (ref 0–0.1)
BASOPHILS # BLD: 0 % (ref 0–1)
BLD PROD TYP BPU: NORMAL
BLOOD GROUP ANTIBODIES SERPL: NORMAL
BPU ID: NORMAL
BUN SERPL-MCNC: 23 MG/DL (ref 6–20)
BUN/CREAT SERPL: 16 (ref 12–20)
CALCIUM SERPL-MCNC: 8.5 MG/DL (ref 8.5–10.1)
CHLORIDE SERPL-SCNC: 108 MMOL/L (ref 97–108)
CO2 SERPL-SCNC: 24 MMOL/L (ref 21–32)
CREAT SERPL-MCNC: 1.41 MG/DL (ref 0.55–1.02)
CROSSMATCH RESULT,%XM: NORMAL
DIFFERENTIAL METHOD BLD: ABNORMAL
EOSINOPHIL # BLD: 0.9 K/UL (ref 0–0.4)
EOSINOPHIL NFR BLD: 5 % (ref 0–7)
ERYTHROCYTE [DISTWIDTH] IN BLOOD BY AUTOMATED COUNT: 16.1 % (ref 11.5–14.5)
GLUCOSE BLD STRIP.AUTO-MCNC: 104 MG/DL (ref 65–100)
GLUCOSE BLD STRIP.AUTO-MCNC: 114 MG/DL (ref 65–100)
GLUCOSE BLD STRIP.AUTO-MCNC: 125 MG/DL (ref 65–100)
GLUCOSE SERPL-MCNC: 95 MG/DL (ref 65–100)
HCT VFR BLD AUTO: 28.7 % (ref 35–47)
HGB BLD-MCNC: 8.9 G/DL (ref 11.5–16)
LYMPHOCYTES # BLD AUTO: 22 % (ref 12–49)
LYMPHOCYTES # BLD: 3.9 K/UL (ref 0.8–3.5)
MAGNESIUM SERPL-MCNC: 1.9 MG/DL (ref 1.6–2.4)
MCH RBC QN AUTO: 28.3 PG (ref 26–34)
MCHC RBC AUTO-ENTMCNC: 31 G/DL (ref 30–36.5)
MCV RBC AUTO: 91.4 FL (ref 80–99)
METAMYELOCYTES NFR BLD MANUAL: 1 %
MONOCYTES # BLD: 1.3 K/UL (ref 0–1)
MONOCYTES NFR BLD AUTO: 7 % (ref 5–13)
NEUTS BAND NFR BLD MANUAL: 1 % (ref 0–6)
NEUTS SEG # BLD: 11.6 K/UL (ref 1.8–8)
NEUTS SEG NFR BLD AUTO: 64 % (ref 32–75)
PHOSPHATE SERPL-MCNC: 4.7 MG/DL (ref 2.6–4.7)
PLATELET # BLD AUTO: 510 K/UL (ref 150–400)
PLATELET COMMENTS,PCOM: ABNORMAL
POTASSIUM SERPL-SCNC: 4.2 MMOL/L (ref 3.5–5.1)
RBC # BLD AUTO: 3.14 M/UL (ref 3.8–5.2)
RBC MORPH BLD: ABNORMAL
SERVICE CMNT-IMP: ABNORMAL
SODIUM SERPL-SCNC: 141 MMOL/L (ref 136–145)
SPECIMEN EXP DATE BLD: NORMAL
STATUS OF UNIT,%ST: NORMAL
UNIT DIVISION, %UDIV: 0
WBC # BLD AUTO: 17.9 K/UL (ref 3.6–11)

## 2017-07-11 PROCEDURE — 36415 COLL VENOUS BLD VENIPUNCTURE: CPT | Performed by: SURGERY

## 2017-07-11 PROCEDURE — 77030019952 HC CANSTR VAC ASST KCON -B

## 2017-07-11 PROCEDURE — 74011250636 HC RX REV CODE- 250/636: Performed by: SURGERY

## 2017-07-11 PROCEDURE — 02HV33Z INSERTION OF INFUSION DEVICE INTO SUPERIOR VENA CAVA, PERCUTANEOUS APPROACH: ICD-10-PCS | Performed by: ANESTHESIOLOGY

## 2017-07-11 PROCEDURE — 74011000250 HC RX REV CODE- 250: Performed by: INTERNAL MEDICINE

## 2017-07-11 PROCEDURE — 74011250636 HC RX REV CODE- 250/636: Performed by: NURSE PRACTITIONER

## 2017-07-11 PROCEDURE — 74011250636 HC RX REV CODE- 250/636: Performed by: PHYSICAL MEDICINE & REHABILITATION

## 2017-07-11 PROCEDURE — 74011636637 HC RX REV CODE- 636/637: Performed by: SURGERY

## 2017-07-11 PROCEDURE — 3331090001 HH PPS REVENUE CREDIT

## 2017-07-11 PROCEDURE — C9113 INJ PANTOPRAZOLE SODIUM, VIA: HCPCS | Performed by: SURGERY

## 2017-07-11 PROCEDURE — 82962 GLUCOSE BLOOD TEST: CPT

## 2017-07-11 PROCEDURE — 74011000258 HC RX REV CODE- 258: Performed by: SURGERY

## 2017-07-11 PROCEDURE — 71010 XR CHEST PORT: CPT

## 2017-07-11 PROCEDURE — 84100 ASSAY OF PHOSPHORUS: CPT | Performed by: SURGERY

## 2017-07-11 PROCEDURE — 83735 ASSAY OF MAGNESIUM: CPT | Performed by: SURGERY

## 2017-07-11 PROCEDURE — 74011250636 HC RX REV CODE- 250/636: Performed by: INTERNAL MEDICINE

## 2017-07-11 PROCEDURE — 74011000250 HC RX REV CODE- 250: Performed by: SURGERY

## 2017-07-11 PROCEDURE — 77030010540 HC BG OST DRN BMS -A

## 2017-07-11 PROCEDURE — 80048 BASIC METABOLIC PNL TOTAL CA: CPT | Performed by: SURGERY

## 2017-07-11 PROCEDURE — 74011000258 HC RX REV CODE- 258: Performed by: INTERNAL MEDICINE

## 2017-07-11 PROCEDURE — 85025 COMPLETE CBC W/AUTO DIFF WBC: CPT | Performed by: SURGERY

## 2017-07-11 PROCEDURE — 77030018717 HC DRSG GRNUFM KCON -B

## 2017-07-11 PROCEDURE — 3331090002 HH PPS REVENUE DEBIT

## 2017-07-11 PROCEDURE — 65660000000 HC RM CCU STEPDOWN

## 2017-07-11 PROCEDURE — 97606 NEG PRS WND THER DME>50 SQCM: CPT

## 2017-07-11 RX ADMIN — Medication 1 MG: at 13:37

## 2017-07-11 RX ADMIN — ENOXAPARIN SODIUM 40 MG: 40 INJECTION SUBCUTANEOUS at 22:42

## 2017-07-11 RX ADMIN — OCTREOTIDE ACETATE 50 MCG: 50 INJECTION, SOLUTION INTRAVENOUS; SUBCUTANEOUS at 15:45

## 2017-07-11 RX ADMIN — PIPERACILLIN SODIUM,TAZOBACTAM SODIUM 3.38 G: 3; .375 INJECTION, POWDER, FOR SOLUTION INTRAVENOUS at 13:38

## 2017-07-11 RX ADMIN — HYDROMORPHONE HYDROCHLORIDE 1 MG: 1 INJECTION, SOLUTION INTRAMUSCULAR; INTRAVENOUS; SUBCUTANEOUS at 20:53

## 2017-07-11 RX ADMIN — PIPERACILLIN SODIUM,TAZOBACTAM SODIUM 3.38 G: 3; .375 INJECTION, POWDER, FOR SOLUTION INTRAVENOUS at 21:01

## 2017-07-11 RX ADMIN — Medication 1 MG: at 07:04

## 2017-07-11 RX ADMIN — Medication 10 ML: at 13:38

## 2017-07-11 RX ADMIN — SODIUM CHLORIDE 100 MG: 900 INJECTION, SOLUTION INTRAVENOUS at 17:51

## 2017-07-11 RX ADMIN — SODIUM CHLORIDE 40 MG: 9 INJECTION INTRAMUSCULAR; INTRAVENOUS; SUBCUTANEOUS at 10:10

## 2017-07-11 RX ADMIN — SODIUM CHLORIDE, SODIUM LACTATE, POTASSIUM CHLORIDE, AND CALCIUM CHLORIDE 100 ML/HR: 600; 310; 30; 20 INJECTION, SOLUTION INTRAVENOUS at 15:28

## 2017-07-11 RX ADMIN — SODIUM CHLORIDE 5 MG: 9 INJECTION INTRAMUSCULAR; INTRAVENOUS; SUBCUTANEOUS at 07:07

## 2017-07-11 RX ADMIN — OCTREOTIDE ACETATE 50 MCG: 50 INJECTION, SOLUTION INTRAVENOUS; SUBCUTANEOUS at 10:10

## 2017-07-11 RX ADMIN — ONDANSETRON 8 MG: 2 INJECTION INTRAMUSCULAR; INTRAVENOUS at 18:58

## 2017-07-11 RX ADMIN — PIPERACILLIN SODIUM,TAZOBACTAM SODIUM 3.38 G: 3; .375 INJECTION, POWDER, FOR SOLUTION INTRAVENOUS at 07:04

## 2017-07-11 RX ADMIN — Medication 1 MG: at 21:00

## 2017-07-11 RX ADMIN — Medication: at 22:38

## 2017-07-11 RX ADMIN — OCTREOTIDE ACETATE 50 MCG: 50 INJECTION, SOLUTION INTRAVENOUS; SUBCUTANEOUS at 21:01

## 2017-07-11 RX ADMIN — Medication: at 13:01

## 2017-07-11 RX ADMIN — SODIUM CHLORIDE 5 MG: 9 INJECTION INTRAMUSCULAR; INTRAVENOUS; SUBCUTANEOUS at 18:00

## 2017-07-11 RX ADMIN — VANCOMYCIN HYDROCHLORIDE 1500 MG: 10 INJECTION, POWDER, LYOPHILIZED, FOR SOLUTION INTRAVENOUS at 03:40

## 2017-07-11 RX ADMIN — HYDROMORPHONE HYDROCHLORIDE 1 MG: 1 INJECTION, SOLUTION INTRAMUSCULAR; INTRAVENOUS; SUBCUTANEOUS at 04:11

## 2017-07-11 RX ADMIN — CALCIUM GLUCONATE: 94 INJECTION, SOLUTION INTRAVENOUS at 19:09

## 2017-07-11 NOTE — PROGRESS NOTES
Bedside shift change report given to Amador RN (oncoming nurse) by April, RN (offgoing nurse). Report included the following information SBAR, Intake/Output, MAR, Accordion and Cardiac Rhythm NSR/ST.

## 2017-07-11 NOTE — PROCEDURES
Central Line Placement    Start time: 7/11/2017 12:43 AM  End time: 7/11/2017 12:53 AM  Performed by: Ronnie Culp  Authorized by: Ronnie Culp     Indications: vascular access  Preanesthetic Checklist: patient identified, risks and benefits discussed, anesthesia consent, site marked, patient being monitored and timeout performed    Timeout Time: 00:43       Pre-procedure: All elements of maximal sterile barrier technique followed?  Yes    Maximal barrier precautions followed, 2% Chlorhexidine for cutaneous antisepsis, Hand hygiene performed prior to catheter insertion and Ultrasound guidance            Procedure:   Prep:  ChloraPrep  Location:  Internal jugular  Orientation:  Right  Patient position:  Trendelenburg    Catheter size:  8.5 Fr  Catheter length:  16 cm  Number of attempts:  1  Successful placement: Yes      Assessment:   Post-procedure:  Catheter secured, sterile dressing applied and sterile dressing with CHG applied  Assessment:  Placement verified by x-ray, free fluid flow, blood return through all ports and guidewire removal verified  Insertion:  Uncomplicated  Patient tolerance:  Patient tolerated the procedure well with no immediate complications

## 2017-07-11 NOTE — PROGRESS NOTES
Subjective  Pain under control   No Nausea      Temp:  [97.2 °F (36.2 °C)-99.2 °F (37.3 °C)]   Pulse (Heart Rate):  []   BP: ()/()   Resp Rate:  [12-25]   O2 Sat (%):  [94 %-100 %]   Weight:  [326 lb 4.5 oz (148 kg)-342 lb 6 oz (155.3 kg)]   07/11 0701 - 07/11 1900  In: 0   Out: 6603 [Urine:700; Drains:315]  07/09 1901 - 07/11 0700  In: 3911.8 [I.V.:3911.8]  Out: 3201 [Urine:4350; Drains:2185]      Objective  Gen- Alert in NAD  Lungs- CTA  h-RRR  Abd- S/vac in place  Serosanginous drainage in fred and vac dressing  Some bilious drainage on the Left tube.   Recent Results (from the past 24 hour(s))   GLUCOSE, POC    Collection Time: 07/10/17 11:09 AM   Result Value Ref Range    Glucose (POC) 100 65 - 100 mg/dL    Performed by [employer], POC    Collection Time: 07/10/17  4:13 PM   Result Value Ref Range    Glucose (POC) 100 65 - 100 mg/dL    Performed by Bettina Lopez    GLUCOSE, POC    Collection Time: 07/10/17 11:58 PM   Result Value Ref Range    Glucose (POC) 96 65 - 100 mg/dL    Performed by Desmond Alfaro    MAGNESIUM    Collection Time: 07/11/17  4:30 AM   Result Value Ref Range    Magnesium 1.9 1.6 - 2.4 mg/dL   PHOSPHORUS    Collection Time: 07/11/17  4:30 AM   Result Value Ref Range    Phosphorus 4.7 2.6 - 4.7 MG/DL   METABOLIC PANEL, BASIC    Collection Time: 07/11/17  4:30 AM   Result Value Ref Range    Sodium 141 136 - 145 mmol/L    Potassium 4.2 3.5 - 5.1 mmol/L    Chloride 108 97 - 108 mmol/L    CO2 24 21 - 32 mmol/L    Anion gap 9 5 - 15 mmol/L    Glucose 95 65 - 100 mg/dL    BUN 23 (H) 6 - 20 MG/DL    Creatinine 1.41 (H) 0.55 - 1.02 MG/DL    BUN/Creatinine ratio 16 12 - 20      GFR est AA 50 (L) >60 ml/min/1.73m2    GFR est non-AA 42 (L) >60 ml/min/1.73m2    Calcium 8.5 8.5 - 10.1 MG/DL   CBC WITH AUTOMATED DIFF    Collection Time: 07/11/17  4:30 AM   Result Value Ref Range    WBC 17.9 (H) 3.6 - 11.0 K/uL    RBC 3.14 (L) 3.80 - 5.20 M/uL    HGB 8.9 (L) 11.5 - 16.0 g/dL HCT 28.7 (L) 35.0 - 47.0 %    MCV 91.4 80.0 - 99.0 FL    MCH 28.3 26.0 - 34.0 PG    MCHC 31.0 30.0 - 36.5 g/dL    RDW 16.1 (H) 11.5 - 14.5 %    PLATELET 341 (H) 492 - 400 K/uL    NEUTROPHILS 64 32 - 75 %    BAND NEUTROPHILS 1 0 - 6 %    LYMPHOCYTES 22 12 - 49 %    MONOCYTES 7 5 - 13 %    EOSINOPHILS 5 0 - 7 %    BASOPHILS 0 0 - 1 %    METAMYELOCYTES 1 (H) 0 %    ABS. NEUTROPHILS 11.6 (H) 1.8 - 8.0 K/UL    ABS. LYMPHOCYTES 3.9 (H) 0.8 - 3.5 K/UL    ABS. MONOCYTES 1.3 (H) 0.0 - 1.0 K/UL    ABS. EOSINOPHILS 0.9 (H) 0.0 - 0.4 K/UL    ABS. BASOPHILS 0.0 0.0 - 0.1 K/UL    DF MANUAL      PLATELET COMMENTS LARGE PLATELETS      RBC COMMENTS ANISOCYTOSIS  1+       GLUCOSE, POC    Collection Time: 07/11/17  7:03 AM   Result Value Ref Range    Glucose (POC) 104 (H) 65 - 100 mg/dL    Performed by Robin Roberts      IR- The enteral tube demonstrates communication with ileum but also  extravasation of contrast into the peritoneum. Active Problems:    Abdominal pain (6/25/2017)      Small bowel ischemia ([address] (6/28/2017)      Overview: CT abd/pelvis 6/28/17      1. The stomach and proximal small bowel loops are distended. There is a       loop of      small bowel in the midabdomen which is mildly dilated and does not       demonstrate      enhancement in the wall and there is suspicion of pneumatosis and this       leads to      a loop of small bowel which demonstrates thickening of the wall and       findings are      suspicious for ischemia. 2. There is extensive mesenteric edema and a small amount of ascites in       the      pelvis. 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 There is nonocclusive thrombus in the SMA. 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)          Assessment & Plan  IR results noted- Will keep the tubes to drainage as we cannot use them to feed. Continue TPN  Ok for ice chips and Maicol ices  Vac dressing change today.   Pain control

## 2017-07-11 NOTE — PROGRESS NOTES
Bedside and Verbal shift change report given to tiana (oncoming nurse) by robert (offgoing nurse). Report included the following information SBAR, Kardex, Intake/Output, Recent Results and Cardiac Rhythm nsr/sinus tach.

## 2017-07-11 NOTE — PROGRESS NOTES
Problem: Falls - Risk of  Goal: *Absence of falls  Outcome: Progressing Towards Goal  Call bell within reach, gripper socks on, bedrails up x3  Goal: *Knowledge of fall prevention  Outcome: Progressing Towards Goal  Pt. Calls out appropiately    Problem: Pressure Injury - Risk of  Goal: *Prevention of pressure ulcer  Outcome: Progressing Towards Goal  Pt. Is on turn team q2hr    Problem: Surgical Pathway Post-Op Day 2 through Discharge  Goal: Activity/Safety  Outcome: Progressing Towards Goal  Pt. Is on bedrest  Goal: Nutrition/Diet  Outcome: Progressing Towards Goal  Pt. Is NPO  Goal: Respiratory  Outcome: Progressing Towards Goal  Lung sounds are clear and diminished at the bases  Goal: Psychosocial  Outcome: Progressing Towards Goal  Pt. Was less anxious and teary-eyed today  Goal: *Optimal pain control at patients stated goal  Outcome: Progressing Towards Goal  Pt. Still complains of pain unrelieved by PCA  Goal: *Hemodynamically stable  Outcome: Progressing Towards Goal  VS stable  Goal: *Tolerating diet  Outcome: Progressing Towards Goal  Pt.  NPO

## 2017-07-11 NOTE — PROGRESS NOTES
ID Progress Note  2017    Subjective:     Seems to be having an ok day    Objective:     Antibiotics:  1. Vancomycin  2. Zosyn   3. Eraxis      Vitals:   Visit Vitals    /63 (BP 1 Location: Right arm, BP Patient Position: At rest)    Pulse 90    Temp 98 °F (36.7 °C)    Resp 16    Ht 5' 4\" (1.626 m)    Wt 154.2 kg (339 lb 15.2 oz)    LMP 2017 (Approximate)    SpO2 100%    Breastfeeding No    BMI 58.35 kg/m2        Tmax:  Temp (24hrs), Av.5 °F (36.9 °C), Min:97.9 °F (36.6 °C), Max:98.9 °F (37.2 °C)      Exam:  Lungs:  clear to auscultation bilaterally  Heart:  regularly irregular rhythm  Abdomen:  abnormal findings:  tenderness moderate in the entire abdomen, hypoactive bowel sounds    Labs:      Recent Labs      17   0430  07/10/17   0230  17   1123  17   0337   WBC  17.9*  17.9*  19.2*   --    HGB  8.9*  8.1*  9.1*   --    PLT  510*  455*  511*   --    BUN  23*  29*  31*  31*   CREA  1.41*  1.32*  1.33*  1.36*       Cultures:     Lab Results   Component Value Date/Time    Specimen Description: URINE 2009 07:45 PM     Lab Results   Component Value Date/Time    Culture result: NO GROWTH ON SOLID MEDIA AT 4 DAYS 2017 12:17 PM    Culture result:  2017 12:17 PM     **METHICILLIN RESISTANT STAPHYLOCOCCUS AUREUS**  ISOLATED FROM THIO BROTH ONLY      Culture result: NO GROWTH 5 DAYS 2017 02:35 PM       Radiology:     Line/Insert Date:           Assessment:     1. Ischemic gut with frozen abdomen  2. Leukocytosis--back up after surgery--trending back down  3. Cultures negative to date    Objective:     1. Continue IV therapy  2. Follow up cultures and studies  3.  Work up fevers    Christen Ayers MD

## 2017-07-11 NOTE — OP NOTES
[address] [facility] [address], [address]   OP NOTE       Name:  Sury Villalta   MR#:  [id number]   :  1975   Account #:  [de-identified]    Surgery Date:  2017   Date of Adm:  2017       PREOPERATIVE DIAGNOSIS: Enterocutaneous fistula with   abdominal wound infection. POSTOPERATIVE DIAGNOSIS: Enterocutaneous fistula with frozen   abdomen and abdominal wound infection. PROCEDURES PERFORMED:   1. Exploratory laparotomy with lysis of adhesions for 2 hours. 2. Fistula exclusion with feeding tube placement. 3. Wound vacuum-assisted closure placement. ATTENDING SURGEON/PRIMARY SURGEON: Jo Ann Edwards MD    ANESTHESIA: General endotracheal.     ESTIMATED BLOOD LOSS: 100 mL. SPECIMENS REMOVED: None. FINDINGS: There was a rosebud fistula in the midline abdominal   wound. We spent 2 hours performing adhesiolysis to free up the fistula   for resection and reanastomosis; however, the small bowel was   completely encased in a phlegmon process and the abdomen was   frozen. We then proceeded to place two 32-Croatian Malecot drains. One   drain was placed proximally for decompression of the fistula. The 2nd   drain was placed to serve as a feeding tube. An Marie Porter drain was   placed in the midline and externalized to the right lower quadrant. A   wound VAC was placed over the midline wound. COMPLICATIONS: None. IMPLANTS: None. INDICATIONS FOR OPERATION: The patient is a 60-year-old woman   with a history of Crohn disease, who was developing abdominal pain,   nausea, vomiting. The patient underwent an EGD to retrieve a capsule   that was used to study her nausea and vomiting. She developed a   perforation during this time, so was taken emergently to the operating   room. She had adhesiolysis at the time and did well and was   discharged home.  The patient then presented back to [facility] with a superior mesenteric artery thrombosis and there was   question of dead bowel, so she was taken back to the operating room   for adhesiolysis and an SMA thrombectomy. Postoperatively, she   developed a fistula with high output. A small bowel followthrough study   as well as a CT shows a fistula to be proximal. The output was greater   than 1500 mL daily. Given the high output with a proximal fistula and a   history of Crohn disease, it was unlikely the fistula was going to close   on its own. In addition, the patient was losing almost 2 L of fluid from   her GI tract daily causing dehydration. At this point, the decision was   then made to take her back to operating room for a possible fistula   takedown and clean up the midline wound which was infected. DESCRIPTION OF PROCEDURE: After informed consent was   obtained from the patient, the patient was taken to the operating room,   placed in supine position on the operating table. She underwent   general anesthesia with endotracheal intubation without any   complication. A Reilly catheter was inserted. The patient was already   on antibiotic prior to arrival to the operating room. The midline was   then prepped and draped in the usual sterile surgical fashion   using Betadine. A surgical timeout was then performed. After the timeout was performed, we then took out the rest of the   staples and the wound VAC in the abdomen. The abdomen was noted   to have a lot of necrotic tissue and purulent fluid as well as a succus in   the midline. This was debrided and cleaned out. At this point, we   turned our attention to the fistula. We noted a large rosebud fistula in   the midline of the abdomen. The tissue around the fistula was very   edematous. With meticulous dissection, we attempted to free up the   fistula proximally and distally so we can resect and perform a small-  bowel anastomosis. However, there was no plane to get in. The   abdomen was frozen.  We then attempted to open up the lower midline, an area that had not been contaminated, to see if we could get in that   way. This area of the abdomen was also frozen. We then turned our attention to upper midline and tried to free up into   the peritoneal cavity. However, the small bowel was completely   adhesed in. We were unable to get into the peritoneal cavity. After   spending approximately 2 hours trying to perform adhesiolysis, the   decision was made not to proceed further because we did not want to   risk injuring more small bowel and creating enterotomy. At this point, a 2nd opinion was asked for in the operating room. Dr. Angel Ceja. Tisha Harper was asked to come to the operating room to see if he   had further thoughts on how we were going to address this fistula. After further discussion, we decided to place a 32 Malecot on the   proximal small bowel to use as a decompressive fistula exclusion. Distally, we placed a 32-Belizean Malecot drain for feeding purposes. The Malecot drain was then placed and was secured to the intestine   using 2-0 Prolene sutures. This was then tunneled to the skin on the   left side. The proximal Malecot was tunneled to the left upper abdomen   and secured with 2-0 nylon. On the right side, the Malecot drain was   tunneled to the right mid abdomen. This was supposed presumably the   distal small bowel. This was secured with 2-0 nylon to the right mid   abdomen. An 8-Belizean drain was then placed over the midline right by   the fistula and secured with a Vicryl sutures. It was secured to the skin   using 2-0 nylon. After this was completed, we then placed a white   sponge foam over the midline fistula. The wound VAC appliance foam   was then secured to the abdomen using Tegaderm. We then hooked   up the wound VAC suction which appeared to be working properly. After the wound VAC appliance had been placed, we then checked our   tubing.  The [place] drain as well as both Malecot drains were then   hooked to Reilly bags. The wound VAC appliance appeared to be   working appropriately at this point. The patient tolerated the procedure well. There were no complications   associated with all of this. Dr. Minh Nathan, the attending surgeon, was   present during the entirety of the case. All needle and instrument   counts were correct x2. The patient was successfully extubated and   was then transported to PACU in stable condition.         MD DIMAS Persaud / JUSTYN   D:  07/10/2017   12:06   T:  07/10/2017   20:07   Job #:  [id number]

## 2017-07-11 NOTE — PROGRESS NOTES
Patients right IJ started to leak around insertion site. Dr. Corey Nageotte called and gave orders to stop everything, attempt a peripheral and if not successful to call anesthesiology to place a new central line in. Consent was obtained and patient was taken down to PACU where the right IJ was removed and a new one was replaced in the right IJ. Awaiting xray results before starting patients TPN/lipids and fluids. Patient is back in room and resting in bed with no complaints at this time.

## 2017-07-11 NOTE — PROGRESS NOTES
Problem: Surgical Pathway Post-Op Day 2 through Discharge  Goal: *Tolerating diet  Outcome: Progressing Towards Goal  Patient is NPO  Goal: *Demonstrates progressive activity  Outcome: Not Progressing Towards Goal  Patient is bed bound at this time

## 2017-07-11 NOTE — WOUND CARE
WOCN Note:     Follow-up visit for fistulized midline incision. Seen with Dr. Fabienne James today.      Chart shows:  Admitted for nausea, vomiting, and abdominal pain; history of Crohn's, DVT, bowel perforation with surgery 6/7/17. Exploratory lap, KATHRINE, fistula exclusion and tube placement by Dr. Fabienne James 7/7/17. WBC = 17.9  On zosyn    Assessment:   Patient is A&O x 4 and mobile. Bed: total care bariatric with air mattress   Diet: TPN  Reilly catheter. Using PCA and tolerates VAC change fairly well. Mucus fistula LLQ: red & Moist and almost at skin level; some mucosal transplantation noted; output is light green mucus. Pouch changed using Activelife. 1. Midline abdominal incision with three drains visible in the base = 22.5 x 21.5 x 5.3 cm  At 2 o'clock there is undermining toward drain tubing ~ 3 cm. Base is ragged tissue 50% moist yellow 50% julia burgundy. 350cc serosanguinous exudate in cannister. New cannister placed. Periwound intact & without erythema. 25 mmHg continuous suction achieved using petroleum gauze as base layer, 1 white sponge, 1 spiral cut black sponge. LUQ drain with green effluent. RUQ drain with out ouput to bedside bag. RLQ to wall suction with serosanguinous output similar to VAC cannister. Heels offloaded on pillow. Recommendations:    Maintain VAC as ordered @ 25 mmHg continuous suction. Skin Care & Pressure Prevention:  Minimize layers of linen/pads under patient to optimize support surface. Turn/reposition approximately every 2 hours and offload heels. Discussed above plan with patient. Transition of Care: Plan to follow as needed while admitted to hospital with [facility] changes on Tuesdays and Fridays.      Josué Butler, YOLANDAN, RN, [facility]  Certified Wound, Ostomy, Continence Nurse  office [phone]  pager [phone] or call  to page

## 2017-07-11 NOTE — PROGRESS NOTES
[address]  [address], [address]       GI PROGRESS NOTE  [facility]  [phone]    NAME: David Otoole   :  1975   MRN:  [id number]       Subjective:     Doing about the same    Objective:     VITALS:   Last 24hrs VS reviewed since prior progress note. Most recent are:  Visit Vitals    /85 (BP 1 Location: Left arm, BP Patient Position: At rest)    Pulse (!) 103    Temp 97.9 °F (36.6 °C)    Resp 15    Ht 5' 4\" (1.626 m)    Wt 154.2 kg (339 lb 15.2 oz)    SpO2 100%    Breastfeeding No    BMI 58.35 kg/m2       PHYSICAL EXAM:  General: Cooperative  Neurologic:  Alert and oriented X 3. HEENT: PERRLA, EOMI. Lungs:  Diminished to bases  Heart:  s1 s2, tachy  Abdomen: Soft, Non distended, drains and vac  Extremities:  edema  Psych:   Good insight. Not anxious nor agitated.     Lab Data Reviewed:     Recent Results (from the past 24 hour(s))   GLUCOSE, POC    Collection Time: 07/10/17 11:09 AM   Result Value Ref Range    Glucose (POC) 100 65 - 100 mg/dL    Performed by Ever Franco    GLUCOSE, POC    Collection Time: 07/10/17  4:13 PM   Result Value Ref Range    Glucose (POC) 100 65 - 100 mg/dL    Performed by Angelo Cooper    GLUCOSE, POC    Collection Time: 07/10/17 11:58 PM   Result Value Ref Range    Glucose (POC) 96 65 - 100 mg/dL    Performed by Shahla Wolfe    MAGNESIUM    Collection Time: 17  4:30 AM   Result Value Ref Range    Magnesium 1.9 1.6 - 2.4 mg/dL   PHOSPHORUS    Collection Time: 17  4:30 AM   Result Value Ref Range    Phosphorus 4.7 2.6 - 4.7 MG/DL   METABOLIC PANEL, BASIC    Collection Time: 17  4:30 AM   Result Value Ref Range    Sodium 141 136 - 145 mmol/L    Potassium 4.2 3.5 - 5.1 mmol/L    Chloride 108 97 - 108 mmol/L    CO2 24 21 - 32 mmol/L    Anion gap 9 5 - 15 mmol/L    Glucose 95 65 - 100 mg/dL    BUN 23 (H) 6 - 20 MG/DL    Creatinine 1.41 (H) 0.55 - 1.02 MG/DL    BUN/Creatinine ratio 16 12 - 20 GFR est AA 50 (L) >60 ml/min/1.73m2    GFR est non-AA 42 (L) >60 ml/min/1.73m2    Calcium 8.5 8.5 - 10.1 MG/DL   CBC WITH AUTOMATED DIFF    Collection Time: 07/11/17  4:30 AM   Result Value Ref Range    WBC 17.9 (H) 3.6 - 11.0 K/uL    RBC 3.14 (L) 3.80 - 5.20 M/uL    HGB 8.9 (L) 11.5 - 16.0 g/dL    HCT 28.7 (L) 35.0 - 47.0 %    MCV 91.4 80.0 - 99.0 FL    MCH 28.3 26.0 - 34.0 PG    MCHC 31.0 30.0 - 36.5 g/dL    RDW 16.1 (H) 11.5 - 14.5 %    PLATELET 599 (H) 076 - 400 K/uL    NEUTROPHILS 64 32 - 75 %    BAND NEUTROPHILS 1 0 - 6 %    LYMPHOCYTES 22 12 - 49 %    MONOCYTES 7 5 - 13 %    EOSINOPHILS 5 0 - 7 %    BASOPHILS 0 0 - 1 %    METAMYELOCYTES 1 (H) 0 %    ABS. NEUTROPHILS 11.6 (H) 1.8 - 8.0 K/UL    ABS. LYMPHOCYTES 3.9 (H) 0.8 - 3.5 K/UL    ABS. MONOCYTES 1.3 (H) 0.0 - 1.0 K/UL    ABS. EOSINOPHILS 0.9 (H) 0.0 - 0.4 K/UL    ABS. BASOPHILS 0.0 0.0 - 0.1 K/UL    DF MANUAL      PLATELET COMMENTS LARGE PLATELETS      RBC COMMENTS ANISOCYTOSIS  1+       GLUCOSE, POC    Collection Time: 07/11/17  7:03 AM   Result Value Ref Range    Glucose (POC) 104 (H) 65 - 100 mg/dL    Performed by Constanza Comment          ________________________________________________________________________       Assessment:   · Ischemic Bowel s/p SMA stenting  · Crohn's disease s/p ex lap  · EC fistula     Patient Active Problem List   Diagnosis Code    Crohn's disease ([address] K50.90    DVT (deep venous thrombosis) (HCC) I82.409    Incarcerated ventral hernia K46.0    Right flank pain R10.9    Perforated bowel ([address] K63.1    Abdominal pain R10.9    Small bowel ischemia ([address] K55.9    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 Will continue to follow along peripherally with you. Please call with questions. Cain Pardo MD

## 2017-07-12 LAB
ANION GAP BLD CALC-SCNC: 8 MMOL/L (ref 5–15)
BASOPHILS # BLD AUTO: 0 K/UL (ref 0–0.1)
BASOPHILS # BLD: 0 % (ref 0–1)
BUN SERPL-MCNC: 21 MG/DL (ref 6–20)
BUN/CREAT SERPL: 16 (ref 12–20)
CALCIUM SERPL-MCNC: 8.5 MG/DL (ref 8.5–10.1)
CHLORIDE SERPL-SCNC: 108 MMOL/L (ref 97–108)
CO2 SERPL-SCNC: 24 MMOL/L (ref 21–32)
CREAT SERPL-MCNC: 1.32 MG/DL (ref 0.55–1.02)
DIFFERENTIAL METHOD BLD: ABNORMAL
EOSINOPHIL # BLD: 0.8 K/UL (ref 0–0.4)
EOSINOPHIL NFR BLD: 5 % (ref 0–7)
ERYTHROCYTE [DISTWIDTH] IN BLOOD BY AUTOMATED COUNT: 15.8 % (ref 11.5–14.5)
GLUCOSE BLD STRIP.AUTO-MCNC: 109 MG/DL (ref 65–100)
GLUCOSE BLD STRIP.AUTO-MCNC: 140 MG/DL (ref 65–100)
GLUCOSE BLD STRIP.AUTO-MCNC: 197 MG/DL (ref 65–100)
GLUCOSE BLD STRIP.AUTO-MCNC: 226 MG/DL (ref 65–100)
GLUCOSE BLD STRIP.AUTO-MCNC: 87 MG/DL (ref 65–100)
GLUCOSE SERPL-MCNC: 133 MG/DL (ref 65–100)
HCT VFR BLD AUTO: 28.1 % (ref 35–47)
HGB BLD-MCNC: 9 G/DL (ref 11.5–16)
LYMPHOCYTES # BLD AUTO: 34 % (ref 12–49)
LYMPHOCYTES # BLD: 5.6 K/UL (ref 0.8–3.5)
MAGNESIUM SERPL-MCNC: 1.8 MG/DL (ref 1.6–2.4)
MCH RBC QN AUTO: 29.2 PG (ref 26–34)
MCHC RBC AUTO-ENTMCNC: 32 G/DL (ref 30–36.5)
MCV RBC AUTO: 91.2 FL (ref 80–99)
METAMYELOCYTES NFR BLD MANUAL: 1 %
MONOCYTES # BLD: 1.2 K/UL (ref 0–1)
MONOCYTES NFR BLD AUTO: 7 % (ref 5–13)
NEUTS BAND NFR BLD MANUAL: 2 % (ref 0–6)
NEUTS SEG # BLD: 8.7 K/UL (ref 1.8–8)
NEUTS SEG NFR BLD AUTO: 51 % (ref 32–75)
PHOSPHATE SERPL-MCNC: 4.1 MG/DL (ref 2.6–4.7)
PLATELET # BLD AUTO: 498 K/UL (ref 150–400)
PLATELET COMMENTS,PCOM: ABNORMAL
POTASSIUM SERPL-SCNC: 4.1 MMOL/L (ref 3.5–5.1)
RBC # BLD AUTO: 3.08 M/UL (ref 3.8–5.2)
RBC MORPH BLD: ABNORMAL
RBC MORPH BLD: ABNORMAL
SERVICE CMNT-IMP: ABNORMAL
SERVICE CMNT-IMP: NORMAL
SODIUM SERPL-SCNC: 140 MMOL/L (ref 136–145)
VANCOMYCIN SERPL-MCNC: 16.5 UG/ML
WBC # BLD AUTO: 16.5 K/UL (ref 3.6–11)
WBC MORPH BLD: ABNORMAL

## 2017-07-12 PROCEDURE — 83735 ASSAY OF MAGNESIUM: CPT | Performed by: SURGERY

## 2017-07-12 PROCEDURE — 74011000258 HC RX REV CODE- 258: Performed by: NURSE PRACTITIONER

## 2017-07-12 PROCEDURE — 74011000250 HC RX REV CODE- 250: Performed by: INTERNAL MEDICINE

## 2017-07-12 PROCEDURE — 74011636637 HC RX REV CODE- 636/637: Performed by: NURSE PRACTITIONER

## 2017-07-12 PROCEDURE — 3331090001 HH PPS REVENUE CREDIT

## 2017-07-12 PROCEDURE — 36415 COLL VENOUS BLD VENIPUNCTURE: CPT | Performed by: SURGERY

## 2017-07-12 PROCEDURE — 74011000250 HC RX REV CODE- 250: Performed by: NURSE PRACTITIONER

## 2017-07-12 PROCEDURE — 74011250636 HC RX REV CODE- 250/636: Performed by: SURGERY

## 2017-07-12 PROCEDURE — 74011000258 HC RX REV CODE- 258: Performed by: SURGERY

## 2017-07-12 PROCEDURE — C9113 INJ PANTOPRAZOLE SODIUM, VIA: HCPCS | Performed by: SURGERY

## 2017-07-12 PROCEDURE — 84100 ASSAY OF PHOSPHORUS: CPT | Performed by: SURGERY

## 2017-07-12 PROCEDURE — 3331090002 HH PPS REVENUE DEBIT

## 2017-07-12 PROCEDURE — 74011250636 HC RX REV CODE- 250/636: Performed by: INTERNAL MEDICINE

## 2017-07-12 PROCEDURE — 74011000250 HC RX REV CODE- 250: Performed by: SURGERY

## 2017-07-12 PROCEDURE — 80048 BASIC METABOLIC PNL TOTAL CA: CPT | Performed by: SURGERY

## 2017-07-12 PROCEDURE — 97116 GAIT TRAINING THERAPY: CPT | Performed by: PHYSICAL THERAPIST

## 2017-07-12 PROCEDURE — 65660000000 HC RM CCU STEPDOWN

## 2017-07-12 PROCEDURE — 74011000258 HC RX REV CODE- 258: Performed by: INTERNAL MEDICINE

## 2017-07-12 PROCEDURE — 85025 COMPLETE CBC W/AUTO DIFF WBC: CPT | Performed by: SURGERY

## 2017-07-12 PROCEDURE — 74011250636 HC RX REV CODE- 250/636: Performed by: NURSE PRACTITIONER

## 2017-07-12 PROCEDURE — 74011250636 HC RX REV CODE- 250/636: Performed by: PHYSICAL MEDICINE & REHABILITATION

## 2017-07-12 PROCEDURE — 80202 ASSAY OF VANCOMYCIN: CPT | Performed by: SURGERY

## 2017-07-12 PROCEDURE — 97161 PT EVAL LOW COMPLEX 20 MIN: CPT | Performed by: PHYSICAL THERAPIST

## 2017-07-12 PROCEDURE — 74011636637 HC RX REV CODE- 636/637: Performed by: SURGERY

## 2017-07-12 RX ORDER — VANCOMYCIN HYDROCHLORIDE
1250 EVERY 24 HOURS
Status: DISCONTINUED | OUTPATIENT
Start: 2017-07-12 | End: 2017-07-25

## 2017-07-12 RX ADMIN — SODIUM CHLORIDE 5 MG: 9 INJECTION INTRAMUSCULAR; INTRAVENOUS; SUBCUTANEOUS at 21:20

## 2017-07-12 RX ADMIN — PIPERACILLIN SODIUM,TAZOBACTAM SODIUM 3.38 G: 3; .375 INJECTION, POWDER, FOR SOLUTION INTRAVENOUS at 21:25

## 2017-07-12 RX ADMIN — SODIUM CHLORIDE 100 MG: 900 INJECTION, SOLUTION INTRAVENOUS at 17:02

## 2017-07-12 RX ADMIN — Medication: at 23:58

## 2017-07-12 RX ADMIN — Medication 10 ML: at 13:12

## 2017-07-12 RX ADMIN — SODIUM CHLORIDE 40 MG: 9 INJECTION INTRAMUSCULAR; INTRAVENOUS; SUBCUTANEOUS at 08:26

## 2017-07-12 RX ADMIN — Medication: at 12:04

## 2017-07-12 RX ADMIN — HYDROMORPHONE HYDROCHLORIDE 1 MG: 1 INJECTION, SOLUTION INTRAMUSCULAR; INTRAVENOUS; SUBCUTANEOUS at 21:20

## 2017-07-12 RX ADMIN — Medication 1 MG: at 13:11

## 2017-07-12 RX ADMIN — Medication 1 MG: at 06:02

## 2017-07-12 RX ADMIN — VANCOMYCIN HYDROCHLORIDE 1250 MG: 10 INJECTION, POWDER, LYOPHILIZED, FOR SOLUTION INTRAVENOUS at 10:28

## 2017-07-12 RX ADMIN — CALCIUM GLUCONATE: 94 INJECTION, SOLUTION INTRAVENOUS at 19:08

## 2017-07-12 RX ADMIN — DIPHENHYDRAMINE HYDROCHLORIDE 12.5 MG: 50 INJECTION, SOLUTION INTRAMUSCULAR; INTRAVENOUS at 13:59

## 2017-07-12 RX ADMIN — Medication 1 MG: at 21:20

## 2017-07-12 RX ADMIN — PIPERACILLIN SODIUM,TAZOBACTAM SODIUM 3.38 G: 3; .375 INJECTION, POWDER, FOR SOLUTION INTRAVENOUS at 06:03

## 2017-07-12 RX ADMIN — Medication 10 ML: at 23:45

## 2017-07-12 RX ADMIN — OCTREOTIDE ACETATE 50 MCG: 50 INJECTION, SOLUTION INTRAVENOUS; SUBCUTANEOUS at 22:12

## 2017-07-12 RX ADMIN — Medication 10 ML: at 17:07

## 2017-07-12 RX ADMIN — PIPERACILLIN SODIUM,TAZOBACTAM SODIUM 3.38 G: 3; .375 INJECTION, POWDER, FOR SOLUTION INTRAVENOUS at 13:12

## 2017-07-12 RX ADMIN — I.V. FAT EMULSION 500 ML: 20 EMULSION INTRAVENOUS at 19:07

## 2017-07-12 RX ADMIN — SODIUM CHLORIDE, SODIUM LACTATE, POTASSIUM CHLORIDE, AND CALCIUM CHLORIDE 100 ML/HR: 600; 310; 30; 20 INJECTION, SOLUTION INTRAVENOUS at 10:36

## 2017-07-12 RX ADMIN — ENOXAPARIN SODIUM 40 MG: 40 INJECTION SUBCUTANEOUS at 21:29

## 2017-07-12 RX ADMIN — OCTREOTIDE ACETATE 50 MCG: 50 INJECTION, SOLUTION INTRAVENOUS; SUBCUTANEOUS at 08:26

## 2017-07-12 RX ADMIN — HUMAN INSULIN 3 UNITS: 100 INJECTION, SOLUTION SUBCUTANEOUS at 23:43

## 2017-07-12 RX ADMIN — OCTREOTIDE ACETATE 50 MCG: 50 INJECTION, SOLUTION INTRAVENOUS; SUBCUTANEOUS at 17:02

## 2017-07-12 NOTE — PROGRESS NOTES
Bedside shift change report given to [address] [place] [address],[address] (oncoming nurse) by Oswaldo Rosales (offgoing nurse). Report included the following information SBAR, Intake/Output, MAR, Recent Results and Cardiac Rhythm NSR.

## 2017-07-12 NOTE — PROGRESS NOTES
Pharmacist Note - Vancomycin Dosing  Therapy day 18  Indication:  Surgical site infection / ischemic bowel S/P SMA stenting   Current regimen:  1.5 gm IV Q 24 hr    A Random Level resulted at 16.5 mcg/mL which was obtained ~23 hrs post-dose. Goal trough: 10 - 15 mcg/mL     Plan: Continue to hold dose. Pharmacy will continue to monitor this patient daily for changes in clinical status and renal function.

## 2017-07-12 NOTE — PROGRESS NOTES
General Surgery Daily Progress Note    Admit Date: 2017  Post-Operative Day: * Surgery Date in Future * from Procedure(s) with comments:  SPECIAL PROCEDURE OUTSIDE OF OR - central line placement     Subjective:     Last 24 hrs: Pt is comfortable at present. No nausea or pain. Afraid to move d/t all of her drains  ON TPN and abx    Objective:     Blood pressure 131/59, pulse 93, temperature 98.5 °F (36.9 °C), resp. rate 18, height 5' 4\" (1.626 m), weight 339 lb 8.1 oz (154 kg), last menstrual period 2017, SpO2 100 %, not currently breastfeeding. Temp (24hrs), Av.4 °F (36.9 °C), Min:98 °F (36.7 °C), Max:98.8 °F (37.1 °C)      _____________________  Physical Exam:     Alert and Oriented, x3, in no acute distress. Cardiovascular: RRR, no peripheral edema  Lungs:CTAB   Abdomen: soft, midline wound vac present; R side fred drain to wall sxn, R & L side malecot drains to gravity. Ileostomy just changed so no stool present    Assessment:   Active Problems:    Abdominal pain (2017)      Small bowel ischemia ([address] (2017)      Overview: CT abd/pelvis 17      1. The stomach and proximal small bowel loops are distended. There is a       loop of      small bowel in the midabdomen which is mildly dilated and does not       demonstrate      enhancement in the wall and there is suspicion of pneumatosis and this       leads to      a loop of small bowel which demonstrates thickening of the wall and       findings are      suspicious for ischemia. 2. There is extensive mesenteric edema and a small amount of ascites in       the      pelvis. 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 There is nonocclusive thrombus in the SMA.             Enterocutaneous fistula (2017)            Plan:     Cont pca  Renew TPN - add zinc and tr elements per RD recs  Gi/dvt proph  plavix for SMA stent  Cont abx  Daily labs  Ice chips and Maicol ice    Data Review:    Recent Labs      07/12/17   0330  07/11/17   0430  07/10/17   0230   WBC  16.5*  17.9*  17.9*   HGB  9.0*  8.9*  8.1*   HCT  28.1*  28.7*  26.0*   PLT  498*  510*  455*     Recent Labs      07/12/17   0330  07/11/17   0430  07/10/17   0230   NA  140  141  148*   K  4.1  4.2  4.1   CL  108  108  114*   CO2  24  24  23   GLU  133*  95  125*   BUN  21*  23*  29*   CREA  1.32*  1.41*  1.32*   CA  8.5  8.5  8.4*   MG  1.8  1.9  2.0   PHOS  4.1  4.7  6.2*     No results for input(s): AML, LPSE in the last 72 hours.         ______________________  Medications:    Current Facility-Administered Medications   Medication Dose Route Frequency    vancomycin (VANCOCIN) 1250 mg in  ml infusion  1,250 mg IntraVENous Q24H    TPN ADULT - CENTRAL   IntraVENous TITRATE    clopidogrel (PLAVIX) tablet 75 mg  75 mg Oral DAILY    alteplase (CATHFLO) 1 mg in sterile water (preservative free) 1 mL injection  1 mg InterCATHeter PRN    sodium chloride (NS) flush 10 mL  10 mL InterCATHeter Q24H    sodium chloride (NS) flush 10 mL  10 mL InterCATHeter PRN    sodium chloride (NS) flush 10-40 mL  10-40 mL InterCATHeter Q8H    alteplase (CATHFLO) 1 mg in sterile water (preservative free) 1 mL injection  1 mg InterCATHeter PRN    bacitracin 500 unit/gram packet 1 Packet  1 Packet Topical PRN    0.9% sodium chloride infusion 250 mL  250 mL IntraVENous PRN    lactated Ringers infusion  50 mL/hr IntraVENous CONTINUOUS    fat emulsion 20% (LIPOSYN, INTRALIPID) infusion 500 mL  500 mL IntraVENous Q MON, WED & FRI    LORazepam (ATIVAN) injection 1 mg  1 mg IntraVENous Q8H    HYDROmorphone (PF) (DILAUDID) injection 1 mg  1 mg IntraVENous Q1H PRN    aspirin chewable tablet 81 mg  81 mg Oral DAILY    octreotide (SANDOSTATIN) injection 50 mcg  50 mcg IntraVENous TID    insulin regular (NOVOLIN R, HUMULIN R) injection   SubCUTAneous Q6H    morphine (PF)  mg/30 ml   IntraVENous CONTINUOUS    glucose chewable tablet 16 g  4 Tab Oral PRN    glucagon (GLUCAGEN) injection 1 mg  1 mg IntraMUSCular PRN    dextrose 10 % infusion 125-250 mL  125-250 mL IntraVENous PRN    pantoprazole (PROTONIX) 40 mg in sodium chloride 0.9 % 10 mL injection  40 mg IntraVENous DAILY    albuterol-ipratropium (DUO-NEB) 2.5 MG-0.5 MG/3 ML  3 mL Nebulization Q6H PRN    prochlorperazine (COMPAZINE) with saline injection 5 mg  5 mg IntraVENous Q6H PRN    anidulafungin (ERAXIS) 100 mg in 0.9% sodium chloride 130 mL IVPB  100 mg IntraVENous Q24H    ondansetron (ZOFRAN) injection 8 mg  8 mg IntraVENous Q6H PRN    sodium chloride (NS) flush 5-10 mL  5-10 mL IntraVENous Q8H    sodium chloride (NS) flush 5-10 mL  5-10 mL IntraVENous PRN    naloxone (NARCAN) injection 0.4 mg  0.4 mg IntraVENous PRN    diphenhydrAMINE (BENADRYL) injection 12.5 mg  12.5 mg IntraVENous Q4H PRN    enoxaparin (LOVENOX) injection 40 mg  40 mg SubCUTAneous Q24H    piperacillin-tazobactam (ZOSYN) 3.375 g in 0.9% sodium chloride (MBP/ADV) 100 mL  3.375 g IntraVENous Q8H    Vancomycin ---Pharmacy to Dose   Other Rx Dosing/Monitoring       Leonor Laird NP  7/12/2017    Pt seen and examined  Agree with above  She is tolerating ice chips and italian ices  Pain under control  Abd soft and appropriately tender  Vac and drains in place    WBC slowly improving    Plan  Monitor R malinkodt drain output  OOB with PT. Continue ABX and TPN.

## 2017-07-12 NOTE — PROGRESS NOTES
Bedside and Verbal shift change report given to tiana (oncoming nurse) by robert (offgoing nurse). Report included the following information SBAR, Kardex, Intake/Output, Recent Results and Cardiac Rhythm sinus tach/nsr.

## 2017-07-12 NOTE — PROGRESS NOTES
Problem: Mobility Impaired (Adult and Pediatric)  Goal: *Acute Goals and Plan of Care (Insert Text)  Physical Therapy Goals  7/12/2017  1. Patient will move from supine to sit and sit to supine , scoot up and down and roll side to side in bed with independence within 7 day(s). 2. Patient will transfer from bed to chair and chair to bed with supervision/set-up using the least restrictive device within 7 day(s). 3. Patient will perform sit to stand with supervision/set-up within 7 day(s). 4. Patient will ambulate with supervision/set-up for 50 feet with the least restrictive device within 7 day(s). PHYSICAL THERAPY EVALUATION  Patient: Julián Chen (46 y.o. female)  Date: 7/12/2017  Primary Diagnosis: N/V intractable post-opt pain  Abdominal pain  poor iv access  DEAD BOWEL  SMALL BOWEL OBSTRUCTION  central line placement  Procedure(s) (LRB):  SPECIAL PROCEDURE OUTSIDE OF OR (N/A) * Surgery Date in Future *   Precautions:          ASSESSMENT :  Based on the objective data described below, the patient presents with impaired mobility and gait/endurance due to extensive abdominal surgery and prolonged bed rest.  Patient has multiple lines, drains, and a wound vac. Requires assist of 2 for all mobility due to all her lines. She did very well today. Came to sitting EOB with supervision using the bed rail. She sat for 5 minutes while we straightened the lines and once up we deflated the mattress as she is quite high off the floor with it inflated. She then stood with a RW and side stepped 3 feet to the [facility] using a RW with CGA of 2. No balance loss   Cooperative and motivated. Rather emotional as this was her first time standing in awhile. Will follow for OOB/gait, mobility. Patient will benefit from skilled intervention to address the above impairments.   Patients rehabilitation potential is considered to be Good  Factors which may influence rehabilitation potential include:   [ ]         None noted  [ ]         Mental ability/status  [X]         Medical condition  [ ]         Home/family situation and support systems  [ ]         Safety awareness  [ ]         Pain tolerance/management  [ ]         Other:        PLAN :  Recommendations and Planned Interventions:  [X]           Bed Mobility Training             [ ]    Neuromuscular Re-Education  [X]           Transfer Training                   [ ]    Orthotic/Prosthetic Training  [X]           Gait Training                         [ ]    Modalities  [ ]           Therapeutic Exercises           [ ]    Edema Management/Control  [ ]           Therapeutic Activities            [ ]    Patient and Family Training/Education  [ ]           Other (comment):     Frequency/Duration: Patient will be followed by physical therapy  5 times a week to address goals. Discharge Recommendations: To Be Determined closer to discharge. Anticipate home. Further Equipment Recommendations for Discharge: TBD       SUBJECTIVE:   Patient stated I am nervous. I haven't stood in about 3 weeks.       OBJECTIVE DATA SUMMARY:   HISTORY:    Past Medical History:   Diagnosis Date    Crohn's disease ([facility] 8/15/2011    DVT (deep venous thrombosis) ([facility] 8/15/2011    Incarcerated ventral hernia 8/15/2011    Right flank pain 8/22/2011    Seizures ([facility]      Stroke [facility])       Past Surgical History:   Procedure Laterality Date    HX OTHER SURGICAL         multiple procedures related to her Crohn's disease    NH LAP, INCISIONAL HERNIA REPAIR,INCARCERATED   9-10-08     dr. Ulices Dutta     Prior Level of Function/Home Situation: Independent ambulation with no AD.    Personal factors and/or comorbidities impacting plan of care:      Home Situation  Home Environment: Private residence  # Steps to Enter: 7  One/Two Story Residence: Two story  # of Interior Steps: 13  Interior Rails: Right  Lift Chair Available: No  Living Alone: No  Support Systems: Parent  Patient Expects to be Discharged to[de-identified] Private residence  Current DME Used/Available at Home: None     EXAMINATION/PRESENTATION/DECISION MAKING:   Critical Behavior:  Neurologic State: Alert  Orientation Level: Appropriate for age, Oriented X4  Cognition: Appropriate decision making, Follows commands     Hearing: Auditory  Auditory Impairment: None  Skin:  Per nursing. Edema: per nursing.    Range Of Motion:  AROM: Generally decreased, functional           PROM: Generally decreased, functional           Strength:    Strength: Generally decreased, functional                    Tone & Sensation:   Tone: Normal              Sensation: Intact               Coordination:  Coordination: Within functional limits  Vision:      Functional Mobility:  Bed Mobility:  Rolling: Modified independent  Supine to Sit: Modified independent  Sit to Supine: Minimum assistance;Assist x2  Scooting: Supervision  Transfers:  Sit to Stand: Contact guard assistance  Stand to Sit: Contact guard assistance                       Balance:   Sitting: Intact  Standing: Impaired  Standing - Static: Constant support;Good  Standing - Dynamic : Fair  Ambulation/Gait Training:  Distance (ft): 3 Feet (ft)  Assistive Device: Gait belt;Walker, rolling  Ambulation - Level of Assistance: Assist x2;Contact guard assistance     Gait Description (WDL): Exceptions to WDL  Gait Abnormalities: Decreased step clearance        Base of Support: Widened     Speed/Liz: Pace decreased (<100 feet/min)  Step Length: Left shortened;Right shortened                                            Functional Measure:  Barthel Index:      Bathin  Bladder: 0  Bowels: 0  Groomin  Dressin  Feeding: 10  Mobility: 0  Stairs: 0  Toilet Use: 0  Transfer (Bed to Chair and Back): 0  Total: 20         Barthel and G-code impairment scale:  Percentage of impairment CH  0% CI  1-19% CJ  20-39% CK  40-59% CL  60-79% CM  80-99% CN  100%   Barthel Score 0-100 100 99-80 79-60 59-40 20-39 1-19    0   Barthel Score 0-20 20 17-19 13-16 9-12 5-8 1-4 0      The Barthel ADL Index: Guidelines  1. The index should be used as a record of what a patient does, not as a record of what a patient could do. 2. The main aim is to establish degree of independence from any help, physical or verbal, however minor and for whatever reason. 3. The need for supervision renders the patient not independent. 4. A patient's performance should be established using the best available evidence. Asking the patient, friends/relatives and nurses are the usual sources, but direct observation and common sense are also important. However direct testing is not needed. 5. Usually the patient's performance over the preceding 24-48 hours is important, but occasionally longer periods will be relevant. 6. Middle categories imply that the patient supplies over 50 per cent of the effort. 7. Use of aids to be independent is allowed. Angelica Tapia., Barthel, D.W. (8431). Functional evaluation: the Barthel Index. [facility] (14)2. LANI Ordonez, Kyree Colin, Adriano Edwards., Pat hospitals, [address] [place] [address] (1999). Measuring the change indisability after inpatient rehabilitation; comparison of the responsiveness of the Barthel Index and Functional Aiken Measure. Journal of Neurology, Neurosurgery, and Psychiatry, 66(4), 427-215. [place], [place], TAMARA Lozano, & Aureliano Price, MLorraineA. (2004.) Assessment of post-stroke quality of life in cost-effectiveness studies: The usefulness of the Barthel Index and the EuroQoL-5D. Quality of Life Research, 13, 106-72         G codes: In compliance with CMSs Claims Based Outcome Reporting, the following G-code set was chosen for this patient based on their primary functional limitation being treated: The outcome measure chosen to determine the severity of the functional limitation was the Barthel with a score of 20/100 which was correlated with the impairment scale.       · Mobility - Walking and Moving Around:               - CURRENT STATUS:    CL - 60%-79% impaired, limited or restricted               - GOAL STATUS:           CK - 40%-59% impaired, limited or restricted               - D/C STATUS:                       ---------------To be determined---------------      Pain:  Pain Scale 1: Numeric (0 - 10)  Pain Intensity 1: 6  Pain Location 1: Abdomen  Pain Orientation 1: Anterior  Pain Description 1: Aching  Pain Intervention(s) 1: Encouraged PCA  Activity Tolerance:   Good. Nervous about getting up. Please refer to the flowsheet for vital signs taken during this treatment. After treatment:   [ ]         Patient left in no apparent distress sitting up in chair  [X]         Patient left in no apparent distress in bed  [X]         Call bell left within reach  [X]         Nursing notified  [ ]         Caregiver present  [ ]         Bed alarm activated      COMMUNICATION/EDUCATION:   The patients plan of care was discussed with: Registered Nurse and Rehabilitation Attendant. [X]         Fall prevention education was provided and the patient/caregiver indicated understanding. [X]         Patient/family have participated as able in goal setting and plan of care. [X]         Patient/family agree to work toward stated goals and plan of care. [ ]         Patient understands intent and goals of therapy, but is neutral about his/her participation. [ ]         Patient is unable to participate in goal setting and plan of care.      Thank you for this referral.  Kimberly Kilpatrick, PT   Time Calculation: 28 mins

## 2017-07-12 NOTE — PROGRESS NOTES
ID Progress Note  2017    Subjective:     Seems to be having an ok day    Objective:     Antibiotics:  1. Vancomycin  2. Zosyn   3. Eraxis      Vitals:   Visit Vitals    BP 99/54 (BP 1 Location: Left arm, BP Patient Position: At rest)    Pulse 100    Temp 97.5 °F (36.4 °C)    Resp 19    Ht 5' 4\" (1.626 m)    Wt 154 kg (339 lb 8.1 oz)    LMP 2017 (Approximate)    SpO2 100%    Breastfeeding No    BMI 58.28 kg/m2        Tmax:  Temp (24hrs), Av.3 °F (36.8 °C), Min:97.5 °F (36.4 °C), Max:98.8 °F (37.1 °C)      Exam:  Lungs:  clear to auscultation bilaterally  Heart:  regularly irregular rhythm  Abdomen:  abnormal findings:  tenderness moderate in the entire abdomen, hypoactive bowel sounds    Labs:      Recent Labs      17   0330  17   0430  07/10/17   0230   WBC  16.5*  17.9*  17.9*   HGB  9.0*  8.9*  8.1*   PLT  498*  510*  455*   BUN  21*  23*  29*   CREA  1.32*  1.41*  1.32*       Cultures:     Lab Results   Component Value Date/Time    Specimen Description: URINE 2009 07:45 PM     Lab Results   Component Value Date/Time    Culture result: NO GROWTH ON SOLID MEDIA AT 4 DAYS 2017 12:17 PM    Culture result:  2017 12:17 PM     **METHICILLIN RESISTANT STAPHYLOCOCCUS AUREUS**  ISOLATED FROM THIO BROTH ONLY      Culture result: NO GROWTH 5 DAYS 2017 02:35 PM       Radiology:     Line/Insert Date:           Assessment:     1. Ischemic gut with frozen abdomen  2. Leukocytosis--back up after surgery--trending back down  3. Cultures negative to date    Objective:     1. Continue IV therapy  2. Follow up cultures and studies  3.  Work up fevers    Vic Raza MD

## 2017-07-12 NOTE — PROGRESS NOTES
Calling attending since already rounded to see if can discontinue woodruff catheter-day 4 of strict I's and O's. Maybe willing to trial a purewick?

## 2017-07-12 NOTE — PROGRESS NOTES
Patient was discussed during IDR this am, reviewed note by previous CM. Patient transferred from ICU. Patient remains on TPN/Lipids, NPO and PCA pump for pain control. Wound vac, fred drain and left tube in place. Reilly draining well. PEG Tube not used for feeding yet. Patient needs to ambulate with PT. Disposition plan will be determined when patient is medically stable. CM will continue to follow for any discharge needs. Antonia Slaughter MSA, RN,CRM.

## 2017-07-12 NOTE — PROGRESS NOTES
Palliative Medicine Consult  [facility]: [phone]    Patient Name: Leona Pinto  YOB: 1975    Date of Initial Consult: 6/27/17  Reason for Consult: overwhelming symptoms  Requesting Provider: eVronica Ann NP  Primary Care Physician: Luis A Deluca MD      SUMMARY:   Leona Pinto is a 46 y.o. with a past history of Crohn's disease,anxiety,  chronic pain, hx DVT, multiple (4) small bowel resections, s/p recent urgent dx lap, with lysis of adhesions, repair of suspected perf 6/7/17 , who was admitted on 6/25/2017 from home with a diagnosis of worsening abdominal pain, elevated WBC and MRSA wound infection. Initially consulted for pain management thought to be possible Crohn's flare. Initial CT on 6/25 w/out acute findings but repeat on 6/28 showing ischemic bowl. S/p ex lap but ischemic bowl unresectable, s/p SMA stent. Extubated on 6/30. Most recently s/p ex lap, KATHRINE, feeding tube, fistula exclusion and wound vac placement for EC fistula. Patient is followed chronically for pain management by Dr. Panchito Iqbal at [address] [place] [address]. Home regimen for chronic abdominal pain is MS Contin 60 Q8 and MS IR 30 Q6 PRN. She also takes Xanax 1mg TID prn anxiety.  reviewed, opioids last prescribed on 5/19/17. PALLIATIVE DIAGNOSES:     1. Abd pain, acute on chronic   2. Anxiety, acute on chronic   3. Nausea  4. Crohn's disease  5. Chronic constipation (at home on Relistor)  6. MRSA wound        PLAN:   1. Patient appears comfortable. Reports pain being well controlled. No new complaints. 2. Recommend continuing Morphine PCA 4mg/h basal, 5mg every 10 min demand. Seems to control the pain to some degree and w/out confusion or drowsiness (this has been a problem before). 3. Cont prn Dilaudid 1mg IV every 1h prn pain that is not controlled by PCA- hortencia before any dressing changes. 4. Pt feels that Morphine works better than Dilaudid (at equivalent doses).    5. Bowel regimen per Surgery team- has chronic constipation now on PCA. 6. Psychosocial support- pt suffering from loss of function and identity. 7. Communicated plan of care with: Palliative IDT; have been in communication w/ Dr Julián Eaton. GOALS OF CARE / TREATMENT PREFERENCES:   [====Goals of Care====]  GOALS OF CARE:  Patient / health care proxy stated goals: pain control  Recovery from acute issues      TREATMENT PREFERENCES:   Code Status: Full Code    Advance Care Planning:  Advance Care Planning 6/26/2017   Patient's Healthcare Decision Maker is: Legal Next of Camilo Jiménez   Primary Decision Maker Name Adina Garcia   Primary Decision Maker Phone Number [phone]   Primary Decision Maker Relationship to Patient Parent   Confirm Advance Directive None   Patient Would Like to Complete Advance Directive No       Other:    The palliative care team has discussed with patient / health care proxy about goals of care / treatment preferences for patient.  [====Goals of Care====]         HISTORY:         HPI/SUBJECTIVE:    The patient is:   [x] Verbal and participatory  [] Non-participatory due to: Medical condition     Pt pressing Morphine PCA about 2-5x an hour she states, but PCA just cleared. No confusion or sedation noted. Has used 8 doses of Dilaudid 1mg IV prn over past 24h.      Clinical Pain Assessment (nonverbal scale for severity on nonverbal patients):   [++++ Clinical Pain Assessment++++]  [++++Pain Severity++++]: Pain: 7  [++++Pain Character++++]: sharp and burning, stabbing  [++++Pain Duration++++]: several days  [++++Pain Effect++++]: hard to walk, feeling anxious  [++++Pain Factors++++]: better after pain medicaion  [++++Pain Frequency++++]: constant  [++++Pain Location++++]: across abdomen both sides in middle at incision site  [++++ Clinical Pain Assessment++++]     FUNCTIONAL ASSESSMENT:     Palliative Performance Scale (PPS):  PPS: 50       PSYCHOSOCIAL/SPIRITUAL SCREENING:     Advance Care Planning:  Advance Care Planning 6/26/2017   Patient's Healthcare Decision Maker is: Legal Next of Camilo Jiménez   Primary Decision Maker Name Michael Matthews   Primary Decision Maker Phone Number [phone]   Primary Decision Maker Relationship to Patient Parent   Confirm Advance Directive None   Patient Would Like to Complete Advance Directive No        Any spiritual / Mandaen concerns:  [] Yes /  [x] No    Caregiver Burnout:  [] Yes /  [x] No /  [] No Caregiver Present      Anticipatory grief assessment:   [x] Normal  / [] Maladaptive       ESAS Anxiety: Anxiety: 3    ESAS Depression: Depression: 2        REVIEW OF SYSTEMS:     Positive and pertinent negative findings in ROS are noted above in HPI. The following systems were [x] reviewed / [] unable to be reviewed as noted in HPI  Other findings are noted below. Systems: constitutional, ears/nose/mouth/throat, respiratory, gastrointestinal, genitourinary, musculoskeletal, integumentary, neurologic, psychiatric, endocrine. Positive findings noted below. Modified ESAS Completed by: provider   Fatigue: 4 Drowsiness: 0   Depression: 2 Pain: 7   Anxiety: 3 Nausea: 0   Anorexia: 0 Dyspnea: 0     Constipation: No              PHYSICAL EXAM:     From RN flowsheet:  Wt Readings from Last 3 Encounters:   07/12/17 339 lb 8.1 oz (154 kg)   06/25/17 343 lb (155.6 kg)   06/22/17 343 lb (155.6 kg)     Blood pressure 99/54, pulse 100, temperature 97.5 °F (36.4 °C), resp. rate 19, height 5' 4\" (1.626 m), weight 339 lb 8.1 oz (154 kg), last menstrual period 05/21/2017, SpO2 100 %, not currently breastfeeding.     Pain Scale 1: Numeric (0 - 10)  Pain Intensity 1: 6  Pain Onset 1: post op  Pain Location 1: Abdomen  Pain Orientation 1: Anterior  Pain Description 1: Aching  Pain Intervention(s) 1: Encouraged PCA  Last bowel movement, if known: stool in ostomy     Constitutional:awake, alert, oriented, tearful and axious   Eyes: pupils equal, anicteric  ENMT: no nasal discharge, moist mucous membranes  Cardiac: regular rhythm   Respiratory: breathing not labored  Gastrointestinal: midline incision w/ wound vac, b/l wounds. Hypoactive bowel sounds   Musculoskeletal: no deformity, no tenderness to palpation  Skin: warm, dry  No edema  Neurologic: moving all extremities  Psych: anxious      HISTORY:     Active Problems:    Abdominal pain (6/25/2017)      Small bowel ischemia ([address] (6/28/2017)      Overview: CT abd/pelvis 6/28/17      1. The stomach and proximal small bowel loops are distended. There is a       loop of      small bowel in the midabdomen which is mildly dilated and does not       demonstrate      enhancement in the wall and there is suspicion of pneumatosis and this       leads to      a loop of small bowel which demonstrates thickening of the wall and       findings are      suspicious for ischemia. 2. There is extensive mesenteric edema and a small amount of ascites in       the      pelvis. 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 There is nonocclusive thrombus in the SMA. 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)      Past Medical History:   Diagnosis Date    Crohn's disease ([address] 8/15/2011    DVT (deep venous thrombosis) ([address] 8/15/2011    Incarcerated ventral hernia 8/15/2011    Right flank pain 8/22/2011    Seizures ([address]     Stroke [facility])       Past Surgical History:   Procedure Laterality Date    HX OTHER SURGICAL      multiple procedures related to her Crohn's disease    NC LAP, INCISIONAL HERNIA REPAIR,INCARCERATED  9-10-08    dr. Domingo Mcdaniel      Family History   Problem Relation Age of Onset    Hypertension Father     Stroke Father     Other Father      arthritis      History reviewed, no pertinent family history.   Social History   Substance Use Topics    Smoking status: Former Smoker    Smokeless tobacco: Not on file    Alcohol use No     Allergies   Allergen Reactions    Demerol [Meperidine] Unknown (comments)    Soma [Carisoprodol] Rash and Nausea Only    Sulfa (Sulfonamide Antibiotics) Unknown (comments)    Toradol [Ketorolac Tromethamine] Unknown (comments)      Current Facility-Administered Medications   Medication Dose Route Frequency    vancomycin (VANCOCIN) 1250 mg in  ml infusion  1,250 mg IntraVENous Q24H    TPN ADULT - CENTRAL   IntraVENous TITRATE    clopidogrel (PLAVIX) tablet 75 mg  75 mg Oral DAILY    alteplase (CATHFLO) 1 mg in sterile water (preservative free) 1 mL injection  1 mg InterCATHeter PRN    sodium chloride (NS) flush 10 mL  10 mL InterCATHeter Q24H    sodium chloride (NS) flush 10 mL  10 mL InterCATHeter PRN    sodium chloride (NS) flush 10-40 mL  10-40 mL InterCATHeter Q8H    alteplase (CATHFLO) 1 mg in sterile water (preservative free) 1 mL injection  1 mg InterCATHeter PRN    bacitracin 500 unit/gram packet 1 Packet  1 Packet Topical PRN    0.9% sodium chloride infusion 250 mL  250 mL IntraVENous PRN    lactated Ringers infusion  50 mL/hr IntraVENous CONTINUOUS    fat emulsion 20% (LIPOSYN, INTRALIPID) infusion 500 mL  500 mL IntraVENous Q MON, WED & FRI    LORazepam (ATIVAN) injection 1 mg  1 mg IntraVENous Q8H    HYDROmorphone (PF) (DILAUDID) injection 1 mg  1 mg IntraVENous Q1H PRN    aspirin chewable tablet 81 mg  81 mg Oral DAILY    octreotide (SANDOSTATIN) injection 50 mcg  50 mcg IntraVENous TID    insulin regular (NOVOLIN R, HUMULIN R) injection   SubCUTAneous Q6H    morphine (PF)  mg/30 ml   IntraVENous CONTINUOUS    glucose chewable tablet 16 g  4 Tab Oral PRN    glucagon (GLUCAGEN) injection 1 mg  1 mg IntraMUSCular PRN    dextrose 10 % infusion 125-250 mL  125-250 mL IntraVENous PRN    pantoprazole (PROTONIX) 40 mg in sodium chloride 0.9 % 10 mL injection  40 mg IntraVENous DAILY    albuterol-ipratropium (DUO-NEB) 2.5 MG-0.5 MG/3 ML  3 mL Nebulization Q6H PRN    prochlorperazine (COMPAZINE) with saline injection 5 mg  5 mg IntraVENous Q6H PRN    anidulafungin (ERAXIS) 100 mg in 0.9% sodium chloride 130 mL IVPB  100 mg IntraVENous Q24H    ondansetron (ZOFRAN) injection 8 mg  8 mg IntraVENous Q6H PRN    sodium chloride (NS) flush 5-10 mL  5-10 mL IntraVENous Q8H    sodium chloride (NS) flush 5-10 mL  5-10 mL IntraVENous PRN    naloxone (NARCAN) injection 0.4 mg  0.4 mg IntraVENous PRN    diphenhydrAMINE (BENADRYL) injection 12.5 mg  12.5 mg IntraVENous Q4H PRN    enoxaparin (LOVENOX) injection 40 mg  40 mg SubCUTAneous Q24H    piperacillin-tazobactam (ZOSYN) 3.375 g in 0.9% sodium chloride (MBP/ADV) 100 mL  3.375 g IntraVENous Q8H    Vancomycin ---Pharmacy to Dose   Other Rx Dosing/Monitoring          LAB AND IMAGING FINDINGS:     Lab Results   Component Value Date/Time    WBC 16.5 07/12/2017 03:30 AM    HGB 9.0 07/12/2017 03:30 AM    PLATELET 950 10/79/0722 03:30 AM     Lab Results   Component Value Date/Time    Sodium 140 07/12/2017 03:30 AM    Potassium 4.1 07/12/2017 03:30 AM    Chloride 108 07/12/2017 03:30 AM    CO2 24 07/12/2017 03:30 AM    BUN 21 07/12/2017 03:30 AM    Creatinine 1.32 07/12/2017 03:30 AM    Calcium 8.5 07/12/2017 03:30 AM    Magnesium 1.8 07/12/2017 03:30 AM    Phosphorus 4.1 07/12/2017 03:30 AM      Lab Results   Component Value Date/Time    AST (SGOT) 11 07/08/2017 04:42 AM    Alk. phosphatase 120 07/08/2017 04:42 AM    Protein, total 7.0 07/08/2017 04:42 AM    Albumin 1.5 07/08/2017 04:42 AM    Globulin 5.5 07/08/2017 04:42 AM     No results found for: INR, PTMR, PTP, PT1, PT2, APTT   No results found for: IRON, FE, TIBC, IBCT, PSAT, FERR   No results found for: PH, PCO2, PO2  No components found for: GLPOC   No results found for: CPK, CKMB             Total time:   Counseling / coordination time, spent as noted above:    > 50% counseling / coordination?:     Prolonged service was provided for  []30 min   []75 min in face to face time in the presence of the patient, spent as noted above.   Time Start: Time End:   Note: this can only be billed with 96461 (initial) or 89858 (follow up). If multiple start / stop times, list each separately.

## 2017-07-12 NOTE — PROGRESS NOTES
NUTRITION COMPLETE ASSESSMENT    RECOMMENDATIONS:   1. Continue to monitor I/O's closely and adjust addition LR IVF as appropriate (current TPN + LR is providing 4610 mL/day)    2. Continue daily weights and obtain standing scale once she is able to get out of bed as current weight is up 10.6 kg since admission    3. Consider asking pharmacy if they would allow daily trace elements and daily zinc sulfate (10 mg/day x 10 days) secondary to multiple drains     Interventions/Plan:   Food/Nutrient Delivery: TPN as sole source of nutrition     Assessment:   Reason for Assessment:   [x]Reassessment     Diet: NPO  Nutritionally Significant Medications: [x] Reviewed & Includes: Lactated Ringers @ 100 mL/hr; insulin regular correction scale; ativan q8 hours; protonix daily; zofran q6 hours prn; zosyn q8 hours; vancomycin q 24 hours    TPN: 5%AA D20 @ 85 mL/hr x 1 hour    @ 170 mL/hr x 12 hours    @ 85 mL/hr x last hour   + 20% lipids, 500 mL 3x/week   + 14 units insulin/L, MVI, thiamine (100 mg)    Subjective:  Pt remains on TPN as sole source of nutrition. Admits she hasn't been up out of because of all of her drains. Objective:  Chart reviewed, discussed with RN and team during interdisciplinary rounds. Pt remains on cyclic TPN as sole source of nutrition. LR also running 24hours/day. Noted xray results yesterday and enteral tube cannot be used for feeding (\"The enteral tube demonstrates communication with ileum but also extravasation of contrast into the peritoneum\"). She has 3 drains and wound care is following closely. IJ line replaced overnight and TPN resumed. Current TPN (as above) is providing a daily average of 2374 kcal, 111 g protein in 2210 mL-- meeting 95% and 100% of estimated kcal and protein needs, respectively. LR is running at 100 mL/hr as well, so TPN + IVF is providing 4610 mL/day.     Output (7/9-12):  Drain Output: 1794-4512 mL/day  Ileostomy Output:  mL/day (50 mL so far today)  Urine Output: 3575 mL (7/11)-- can we decrease IVF? Estimated Nutrition Needs:   Kcals/day: 9403 Kcals/day (1529-1021 kcal/day [facility]. Jeor x 1.2-1.3))  Protein: 110 g (2.0g/kg IBW)  Fluid:  (1  ml/kcal)     Based On: Xiao Ba  Weight Used: Actual wt (143.4 kg (wt on admit))    Pt expected to meet estimated nutrient needs:  [x]   Yes (via TPN)    Nutrition Diagnosis:   1. Inadequate oral food/beverage intake related to unresectable ischemic bowel  as evidenced by NPO with plan for parenteral nutrition support. Goals:     TPN to meet at least 90% estimated needs in next 1-2 days. Monitoring & Evaluation:    - Enteral/parenteral nutrition intake   - Electrolyte and renal profile, Weight/weight change, GI profile     Previous Nutrition Goals Met:  Progressing  Previous Recommendations:      Progressing    Education & Discharge Needs:   [x] None Identified   [] Identified and addressed    [x] Participated in care plan, discharge planning, and/or interdisciplinary rounds        Cultural, Nondenominational and ethnic food preferences identified:   None    Skin Integrity: []Intact  [x]Other: fistula and drain  Edema: []None [x]Other: 1+ in LE  Last BM: 7/11/17 (ileostomy)  Food Allergies: [x]None []Other    Anthropometrics:    Weight Loss Metrics 7/12/2017 6/25/2017 6/25/2017 6/25/2017 6/22/2017 6/12/2017 6/6/2017   Today's Wt 339 lb 8.1 oz - 343 lb - 343 lb 343 lb 14.7 oz -   BMI - 58.28 kg/m2 - 58.88 kg/m2 58.88 kg/m2 - 59.03 kg/m2      Last 3 Recorded Weights in this Encounter    07/10/17 0021 07/11/17 0018 07/12/17 0010   Weight: 155.3 kg (342 lb 6 oz) 154.2 kg (339 lb 15.2 oz) 154 kg (339 lb 8.1 oz)      Weight Source: Bed  Height: 5' 4\" (162.6 cm),    Body mass index is 58.28 kg/(m^2).   IBW : 54.4 kg (120 lb), % IBW (Calculated): 263.45 %   ,      Labs:    Lab Results   Component Value Date/Time    Sodium 140 07/12/2017 03:30 AM    Potassium 4.1 07/12/2017 03:30 AM    Chloride 108 07/12/2017 03:30 AM    CO2 24 07/12/2017 03:30 AM    Glucose 133 07/12/2017 03:30 AM    BUN 21 07/12/2017 03:30 AM    Creatinine 1.32 07/12/2017 03:30 AM    Calcium 8.5 07/12/2017 03:30 AM    Magnesium 1.8 07/12/2017 03:30 AM    Phosphorus 4.1 07/12/2017 03:30 AM    Albumin 1.5 07/08/2017 04:42 AM     No results found for: HBA1C, HGBE8, ISL5FCQP, IAW5QNUS  Lab Results   Component Value Date/Time    Glucose 133 07/12/2017 03:30 AM    Glucose (POC) 140 07/12/2017 05:59 AM      Lab Results   Component Value Date/Time    ALT (SGPT) 13 07/08/2017 04:42 AM    AST (SGOT) 11 07/08/2017 04:42 AM    Alk.  phosphatase 120 07/08/2017 04:42 AM    Bilirubin, direct 0.1 07/07/2009 06:38 PM    Bilirubin, total 0.6 07/08/2017 04:42 AM      [address] [place] [address]

## 2017-07-12 NOTE — PROGRESS NOTES
Pharmacist Note - Vancomycin Dosing  Therapy day 18  Indication: Surgical site infection  Current regimen: 1500 mg IV Q 24 hours previously but stopped yesterday    A Trough Level resulted at 16.5 mcg/mL which was obtained 24 hrs post-dose. Goal trough: 10 - 15 mcg/mL     Plan: Change to 1250 mg IV Q 24 hours. Pharmacy will continue to monitor this patient daily for changes in clinical status and renal function.     Ondina Moses, PharmD, BCPS

## 2017-07-13 LAB
ANION GAP BLD CALC-SCNC: 9 MMOL/L (ref 5–15)
BASOPHILS # BLD AUTO: 0.2 K/UL (ref 0–0.1)
BASOPHILS # BLD: 1 % (ref 0–1)
BUN SERPL-MCNC: 26 MG/DL (ref 6–20)
BUN/CREAT SERPL: 20 (ref 12–20)
CALCIUM SERPL-MCNC: 8.9 MG/DL (ref 8.5–10.1)
CHLORIDE SERPL-SCNC: 107 MMOL/L (ref 97–108)
CO2 SERPL-SCNC: 24 MMOL/L (ref 21–32)
CREAT SERPL-MCNC: 1.31 MG/DL (ref 0.55–1.02)
DIFFERENTIAL METHOD BLD: ABNORMAL
EOSINOPHIL # BLD: 1.8 K/UL (ref 0–0.4)
EOSINOPHIL NFR BLD: 10 % (ref 0–7)
ERYTHROCYTE [DISTWIDTH] IN BLOOD BY AUTOMATED COUNT: 15.6 % (ref 11.5–14.5)
GLUCOSE BLD STRIP.AUTO-MCNC: 123 MG/DL (ref 65–100)
GLUCOSE BLD STRIP.AUTO-MCNC: 185 MG/DL (ref 65–100)
GLUCOSE BLD STRIP.AUTO-MCNC: 93 MG/DL (ref 65–100)
GLUCOSE SERPL-MCNC: 129 MG/DL (ref 65–100)
HCT VFR BLD AUTO: 29.1 % (ref 35–47)
HGB BLD-MCNC: 9.1 G/DL (ref 11.5–16)
LYMPHOCYTES # BLD AUTO: 32 % (ref 12–49)
LYMPHOCYTES # BLD: 5.6 K/UL (ref 0.8–3.5)
MAGNESIUM SERPL-MCNC: 1.8 MG/DL (ref 1.6–2.4)
MCH RBC QN AUTO: 28.3 PG (ref 26–34)
MCHC RBC AUTO-ENTMCNC: 31.3 G/DL (ref 30–36.5)
MCV RBC AUTO: 90.7 FL (ref 80–99)
METAMYELOCYTES NFR BLD MANUAL: 1 %
MONOCYTES # BLD: 0.7 K/UL (ref 0–1)
MONOCYTES NFR BLD AUTO: 4 % (ref 5–13)
MYELOCYTES NFR BLD MANUAL: 2 %
NEUTS BAND NFR BLD MANUAL: 2 % (ref 0–6)
NEUTS SEG # BLD: 8.8 K/UL (ref 1.8–8)
NEUTS SEG NFR BLD AUTO: 48 % (ref 32–75)
PHOSPHATE SERPL-MCNC: 4 MG/DL (ref 2.6–4.7)
PLATELET # BLD AUTO: 533 K/UL (ref 150–400)
POTASSIUM SERPL-SCNC: 4.3 MMOL/L (ref 3.5–5.1)
RBC # BLD AUTO: 3.21 M/UL (ref 3.8–5.2)
RBC MORPH BLD: ABNORMAL
SERVICE CMNT-IMP: ABNORMAL
SERVICE CMNT-IMP: ABNORMAL
SERVICE CMNT-IMP: NORMAL
SODIUM SERPL-SCNC: 140 MMOL/L (ref 136–145)
WBC # BLD AUTO: 17.5 K/UL (ref 3.6–11)

## 2017-07-13 PROCEDURE — 74011250636 HC RX REV CODE- 250/636: Performed by: SURGERY

## 2017-07-13 PROCEDURE — 74011250637 HC RX REV CODE- 250/637: Performed by: SURGERY

## 2017-07-13 PROCEDURE — 80048 BASIC METABOLIC PNL TOTAL CA: CPT | Performed by: SURGERY

## 2017-07-13 PROCEDURE — 83735 ASSAY OF MAGNESIUM: CPT | Performed by: SURGERY

## 2017-07-13 PROCEDURE — 74011250636 HC RX REV CODE- 250/636: Performed by: INTERNAL MEDICINE

## 2017-07-13 PROCEDURE — 74011636637 HC RX REV CODE- 636/637: Performed by: SURGERY

## 2017-07-13 PROCEDURE — 65660000000 HC RM CCU STEPDOWN

## 2017-07-13 PROCEDURE — 36415 COLL VENOUS BLD VENIPUNCTURE: CPT | Performed by: SURGERY

## 2017-07-13 PROCEDURE — 3331090002 HH PPS REVENUE DEBIT

## 2017-07-13 PROCEDURE — 76010000093 HC SPECIAL PROCEDURE

## 2017-07-13 PROCEDURE — 74011000258 HC RX REV CODE- 258: Performed by: SURGERY

## 2017-07-13 PROCEDURE — 84100 ASSAY OF PHOSPHORUS: CPT | Performed by: SURGERY

## 2017-07-13 PROCEDURE — 74011000258 HC RX REV CODE- 258: Performed by: INTERNAL MEDICINE

## 2017-07-13 PROCEDURE — C9113 INJ PANTOPRAZOLE SODIUM, VIA: HCPCS | Performed by: SURGERY

## 2017-07-13 PROCEDURE — 77030027138 HC INCENT SPIROMETER -A

## 2017-07-13 PROCEDURE — 74011250636 HC RX REV CODE- 250/636: Performed by: NURSE PRACTITIONER

## 2017-07-13 PROCEDURE — 74011000250 HC RX REV CODE- 250: Performed by: SURGERY

## 2017-07-13 PROCEDURE — 74011000250 HC RX REV CODE- 250: Performed by: INTERNAL MEDICINE

## 2017-07-13 PROCEDURE — 3331090001 HH PPS REVENUE CREDIT

## 2017-07-13 PROCEDURE — 85025 COMPLETE CBC W/AUTO DIFF WBC: CPT | Performed by: SURGERY

## 2017-07-13 PROCEDURE — 97116 GAIT TRAINING THERAPY: CPT

## 2017-07-13 PROCEDURE — 82962 GLUCOSE BLOOD TEST: CPT

## 2017-07-13 PROCEDURE — 74011250636 HC RX REV CODE- 250/636: Performed by: PHYSICAL MEDICINE & REHABILITATION

## 2017-07-13 RX ADMIN — Medication 1 MG: at 13:23

## 2017-07-13 RX ADMIN — HYDROMORPHONE HYDROCHLORIDE 1 MG: 1 INJECTION, SOLUTION INTRAMUSCULAR; INTRAVENOUS; SUBCUTANEOUS at 22:05

## 2017-07-13 RX ADMIN — Medication 10 ML: at 18:20

## 2017-07-13 RX ADMIN — HYDROMORPHONE HYDROCHLORIDE 1 MG: 1 INJECTION, SOLUTION INTRAMUSCULAR; INTRAVENOUS; SUBCUTANEOUS at 19:50

## 2017-07-13 RX ADMIN — Medication: at 20:35

## 2017-07-13 RX ADMIN — PIPERACILLIN SODIUM,TAZOBACTAM SODIUM 3.38 G: 3; .375 INJECTION, POWDER, FOR SOLUTION INTRAVENOUS at 21:11

## 2017-07-13 RX ADMIN — Medication: at 13:21

## 2017-07-13 RX ADMIN — OCTREOTIDE ACETATE 50 MCG: 50 INJECTION, SOLUTION INTRAVENOUS; SUBCUTANEOUS at 10:37

## 2017-07-13 RX ADMIN — Medication 10 ML: at 14:00

## 2017-07-13 RX ADMIN — SODIUM CHLORIDE 5 MG: 9 INJECTION INTRAMUSCULAR; INTRAVENOUS; SUBCUTANEOUS at 04:46

## 2017-07-13 RX ADMIN — SODIUM CHLORIDE 100 MG: 900 INJECTION, SOLUTION INTRAVENOUS at 18:17

## 2017-07-13 RX ADMIN — Medication 1 MG: at 21:10

## 2017-07-13 RX ADMIN — ONDANSETRON 8 MG: 2 INJECTION INTRAMUSCULAR; INTRAVENOUS at 11:02

## 2017-07-13 RX ADMIN — SODIUM CHLORIDE 5 MG: 9 INJECTION INTRAMUSCULAR; INTRAVENOUS; SUBCUTANEOUS at 23:42

## 2017-07-13 RX ADMIN — CLOPIDOGREL BISULFATE 75 MG: 75 TABLET ORAL at 10:35

## 2017-07-13 RX ADMIN — Medication 1 MG: at 07:21

## 2017-07-13 RX ADMIN — ASPIRIN 81 MG CHEWABLE TABLET 81 MG: 81 TABLET CHEWABLE at 10:35

## 2017-07-13 RX ADMIN — ONDANSETRON 8 MG: 2 INJECTION INTRAMUSCULAR; INTRAVENOUS at 20:06

## 2017-07-13 RX ADMIN — ENOXAPARIN SODIUM 40 MG: 40 INJECTION SUBCUTANEOUS at 23:43

## 2017-07-13 RX ADMIN — SODIUM CHLORIDE 40 MG: 9 INJECTION INTRAMUSCULAR; INTRAVENOUS; SUBCUTANEOUS at 10:36

## 2017-07-13 RX ADMIN — Medication 10 ML: at 07:20

## 2017-07-13 RX ADMIN — OCTREOTIDE ACETATE 50 MCG: 50 INJECTION, SOLUTION INTRAVENOUS; SUBCUTANEOUS at 18:19

## 2017-07-13 RX ADMIN — PIPERACILLIN SODIUM,TAZOBACTAM SODIUM 3.38 G: 3; .375 INJECTION, POWDER, FOR SOLUTION INTRAVENOUS at 07:22

## 2017-07-13 RX ADMIN — SODIUM CHLORIDE, SODIUM LACTATE, POTASSIUM CHLORIDE, AND CALCIUM CHLORIDE 50 ML/HR: 600; 310; 30; 20 INJECTION, SOLUTION INTRAVENOUS at 21:11

## 2017-07-13 RX ADMIN — HUMAN INSULIN 2 UNITS: 100 INJECTION, SOLUTION SUBCUTANEOUS at 07:18

## 2017-07-13 RX ADMIN — PIPERACILLIN SODIUM,TAZOBACTAM SODIUM 3.38 G: 3; .375 INJECTION, POWDER, FOR SOLUTION INTRAVENOUS at 14:26

## 2017-07-13 RX ADMIN — VANCOMYCIN HYDROCHLORIDE 1250 MG: 10 INJECTION, POWDER, LYOPHILIZED, FOR SOLUTION INTRAVENOUS at 13:26

## 2017-07-13 RX ADMIN — HYDROMORPHONE HYDROCHLORIDE 1 MG: 1 INJECTION, SOLUTION INTRAMUSCULAR; INTRAVENOUS; SUBCUTANEOUS at 23:59

## 2017-07-13 RX ADMIN — CALCIUM GLUCONATE: 94 INJECTION, SOLUTION INTRAVENOUS at 20:07

## 2017-07-13 RX ADMIN — OCTREOTIDE ACETATE 50 MCG: 50 INJECTION, SOLUTION INTRAVENOUS; SUBCUTANEOUS at 21:11

## 2017-07-13 RX ADMIN — HYDROMORPHONE HYDROCHLORIDE 1 MG: 1 INJECTION, SOLUTION INTRAMUSCULAR; INTRAVENOUS; SUBCUTANEOUS at 20:59

## 2017-07-13 NOTE — PROGRESS NOTES
Subjective  Feeling better today. Tolerating clears  Been OOB    Temp:  [97.2 °F (36.2 °C)-98.9 °F (37.2 °C)]   Pulse (Heart Rate):  []   BP: ()/(50-99)   Resp Rate:  [15-20]   O2 Sat (%):  [94 %-100 %]   Weight:  [339 lb 8.1 oz (154 kg)-339 lb 15.2 oz (154.2 kg)]   07/13 0701 - 07/13 1900  In: 0   Out: 8519 [Urine:850; Drains:885]  07/11 1901 - 07/13 0700  In: 4055.8 [I.V.:4055.8]  Out: 8205 [Urine:6275; Drains:1805]      Objective  Gen- Alert in NAD  Lungs-CTA  H-RRR  Abd- soft vac in place  [place]- Left bilious   Right some thick drainage.     Recent Results (from the past 24 hour(s))   GLUCOSE, POC    Collection Time: 07/12/17  5:58 PM   Result Value Ref Range    Glucose (POC) 87 65 - 100 mg/dL    Performed by Giovanni Marte    GLUCOSE, POC    Collection Time: 07/12/17 11:10 PM   Result Value Ref Range    Glucose (POC) 226 (H) 65 - 100 mg/dL    Performed by Chad MAN(CON)    MAGNESIUM    Collection Time: 07/13/17  4:45 AM   Result Value Ref Range    Magnesium 1.8 1.6 - 2.4 mg/dL   PHOSPHORUS    Collection Time: 07/13/17  4:45 AM   Result Value Ref Range    Phosphorus 4.0 2.6 - 4.7 MG/DL   METABOLIC PANEL, BASIC    Collection Time: 07/13/17  4:45 AM   Result Value Ref Range    Sodium 140 136 - 145 mmol/L    Potassium 4.3 3.5 - 5.1 mmol/L    Chloride 107 97 - 108 mmol/L    CO2 24 21 - 32 mmol/L    Anion gap 9 5 - 15 mmol/L    Glucose 129 (H) 65 - 100 mg/dL    BUN 26 (H) 6 - 20 MG/DL    Creatinine 1.31 (H) 0.55 - 1.02 MG/DL    BUN/Creatinine ratio 20 12 - 20      GFR est AA 55 (L) >60 ml/min/1.73m2    GFR est non-AA 45 (L) >60 ml/min/1.73m2    Calcium 8.9 8.5 - 10.1 MG/DL   CBC WITH AUTOMATED DIFF    Collection Time: 07/13/17  4:45 AM   Result Value Ref Range    WBC 17.5 (H) 3.6 - 11.0 K/uL    RBC 3.21 (L) 3.80 - 5.20 M/uL    HGB 9.1 (L) 11.5 - 16.0 g/dL    HCT 29.1 (L) 35.0 - 47.0 %    MCV 90.7 80.0 - 99.0 FL    MCH 28.3 26.0 - 34.0 PG    MCHC 31.3 30.0 - 36.5 g/dL    RDW 15.6 (H) 11.5 - 14.5 % PLATELET 823 (H) 394 - 400 K/uL    NEUTROPHILS 48 32 - 75 %    BAND NEUTROPHILS 2 0 - 6 %    LYMPHOCYTES 32 12 - 49 %    MONOCYTES 4 (L) 5 - 13 %    EOSINOPHILS 10 (H) 0 - 7 %    BASOPHILS 1 0 - 1 %    METAMYELOCYTES 1 (H) 0 %    MYELOCYTES 2 (H) 0 %    ABS. NEUTROPHILS 8.8 (H) 1.8 - 8.0 K/UL    ABS. LYMPHOCYTES 5.6 (H) 0.8 - 3.5 K/UL    ABS. MONOCYTES 0.7 0.0 - 1.0 K/UL    ABS. EOSINOPHILS 1.8 (H) 0.0 - 0.4 K/UL    ABS. BASOPHILS 0.2 (H) 0.0 - 0.1 K/UL    DF MANUAL      RBC COMMENTS ANISOCYTOSIS  1+       GLUCOSE, POC    Collection Time: 07/13/17  6:37 AM   Result Value Ref Range    Glucose (POC) 185 (H) 65 - 100 mg/dL    Performed by [facility], POC    Collection Time: 07/13/17 12:48 PM   Result Value Ref Range    Glucose (POC) 123 (H) 65 - 100 mg/dL    Performed by Jacki Fuentes            Active Problems:    Abdominal pain (6/25/2017)      Small bowel ischemia ([address] (6/28/2017)      Overview: CT abd/pelvis 6/28/17      1. The stomach and proximal small bowel loops are distended. There is a       loop of      small bowel in the midabdomen which is mildly dilated and does not       demonstrate      enhancement in the wall and there is suspicion of pneumatosis and this       leads to      a loop of small bowel which demonstrates thickening of the wall and       findings are      suspicious for ischemia. 2. There is extensive mesenteric edema and a small amount of ascites in       the      pelvis. 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 There is nonocclusive thrombus in the SMA. 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)          Assessment & Plan    PCA  TPN  ABX- monitor WBC seems to be going up but no left shift   OOB and ambulate. Ok to have some liquids for comfort. Vac dressing.

## 2017-07-13 NOTE — PROGRESS NOTES
ID Progress Note  2017    Subjective:     Seems to be having an ok day    Objective:     Antibiotics:  1. Vancomycin  2. Zosyn   3. Eraxis      Vitals:   Visit Vitals    /61 (BP 1 Location: Right arm, BP Patient Position: At rest)    Pulse 97    Temp 98.2 °F (36.8 °C)    Resp 20    Ht 5' 4\" (1.626 m)    Wt 154.2 kg (339 lb 15.2 oz)    LMP 2017 (Approximate)    SpO2 98%    Breastfeeding No    BMI 58.35 kg/m2        Tmax:  Temp (24hrs), Av.2 °F (36.8 °C), Min:97.7 °F (36.5 °C), Max:98.7 °F (37.1 °C)      Exam:  Lungs:  clear to auscultation bilaterally  Heart:  regularly irregular rhythm  Abdomen:  abnormal findings:  tenderness moderate in the entire abdomen, hypoactive bowel sounds    Labs:      Recent Labs      17   0445  17   0330  17   0430   WBC  17.5*  16.5*  17.9*   HGB  9.1*  9.0*  8.9*   PLT  533*  498*  510*   BUN  26*  21*  23*   CREA  1.31*  1.32*  1.41*       Cultures:     Lab Results   Component Value Date/Time    Specimen Description: URINE 2009 07:45 PM     Lab Results   Component Value Date/Time    Culture result: NO GROWTH ON SOLID MEDIA AT 4 DAYS 2017 12:17 PM    Culture result:  2017 12:17 PM     **METHICILLIN RESISTANT STAPHYLOCOCCUS AUREUS**  ISOLATED FROM THIO BROTH ONLY      Culture result: NO GROWTH 5 DAYS 2017 02:35 PM       Radiology:     Line/Insert Date:           Assessment:     1. Ischemic gut with frozen abdomen  2. Leukocytosis--back up after surgery--trending back down  3. Cultures negative to date    Objective:     1. Continue IV therapy  2. Follow up cultures and studies  3.  Work up fevers    Gladis Johns MD

## 2017-07-13 NOTE — PROGRESS NOTES
Problem: Surgical Pathway Post-Op Day 2 through Discharge  Goal: *No signs and symptoms of infection or wound complications  Outcome: Progressing Towards Goal  Wound and drain sites are clean dry and intact. Goal: *Optimal pain control at patients stated goal  Outcome: Progressing Towards Goal  Patient's pain is being controlled with the use of PCA. Goal: *Adequate urinary output (equal to or greater than 30 milliliters/hour)  Outcome: Progressing Towards Goal  Patient able to void spontaneously. Goal: *Hemodynamically stable  Outcome: Progressing Towards Goal  Patient with stable vitals throughout shift. Goal: *Demonstrates progressive activity  Outcome: Progressing Towards Goal  Patient up from bed to recliner twice today.

## 2017-07-13 NOTE — PROGRESS NOTES
Problem: Falls - Risk of  Goal: *Absence of falls  Outcome: Progressing Towards Goal  Gripper socks on, call bell within reach, bed rails up x3  Goal: *Knowledge of fall prevention  Outcome: Progressing Towards Goal  Pt. Calls out appropriately     Problem: Pressure Injury - Risk of  Goal: *Prevention of pressure ulcer  Outcome: Progressing Towards Goal  Pt. Is on turn team q 2hr    Problem: Surgical Pathway Post-Op Day 2 through Discharge  Goal: Activity/Safety  Outcome: Progressing Towards Goal  Pt. Worked with PT today and was able to sit on EOB and stand and walk 3 feet with walker   Goal: Nutrition/Diet  Outcome: Progressing Towards Goal  Pt. Is NPO  Goal: *Optimal pain control at patients stated goal  Outcome: Progressing Towards Goal  Pt. On PCA but still have complaints of pain   Goal: *Adequate urinary output (equal to or greater than 30 milliliters/hour)  Outcome: Progressing Towards Goal  Pt.  Reilly is DC and has voided using a sylvia wick

## 2017-07-13 NOTE — PROGRESS NOTES
Problem: Mobility Impaired (Adult and Pediatric)  Goal: *Acute Goals and Plan of Care (Insert Text)  Physical Therapy Goals  7/12/2017  1. Patient will move from supine to sit and sit to supine , scoot up and down and roll side to side in bed with independence within 7 day(s). 2. Patient will transfer from bed to chair and chair to bed with supervision/set-up using the least restrictive device within 7 day(s). 3. Patient will perform sit to stand with supervision/set-up within 7 day(s). 4. Patient will ambulate with supervision/set-up for 50 feet with the least restrictive device within 7 day(s). PHYSICAL THERAPY TREATMENT  Patient: Domingo Ayala (46 y.o. female)  Date: 7/13/2017  Diagnosis: N/V intractable post-opt pain  Abdominal pain  poor iv access  DEAD BOWEL  SMALL BOWEL OBSTRUCTION  central line placement <principal problem not specified>  Procedure(s) (LRB):  SPECIAL PROCEDURE OUTSIDE OF OR (N/A) Day of Surgery  Precautions:    Chart, physical therapy assessment, plan of care and goals were reviewed. ASSESSMENT:  Pt agreeable to OOB but displayed some nervousness with movement. Pt required min A to achieve EOB, CGA for sit to stand and ambulated a short distance with rolling walker to sit in the chair. Pt required one person to monitor lines and drains. At this time pt is tolerating OOB. Will continue to progress to assist with discharge planning. Progression toward goals:  [X]      Improving appropriately and progressing toward goals  [ ]      Improving slowly and progressing toward goals  [ ]      Not making progress toward goals and plan of care will be adjusted       PLAN:  Patient continues to benefit from skilled intervention to address the above impairments. Continue treatment per established plan of care. Discharge Recommendations: To Be Determined  Further Equipment Recommendations for Discharge:  TBD       SUBJECTIVE:   Patient stated Is it supposed to look like that.  Pt referring to the drain on suction       OBJECTIVE DATA SUMMARY:   Critical Behavior:  Neurologic State: Alert  Orientation Level: Oriented X4  Cognition: Appropriate decision making, Appropriate for age attention/concentration, Appropriate safety awareness, Follows commands     Functional Mobility Training:  Bed Mobility:     Supine to Sit: Minimum assistance                          Transfers:  Sit to Stand: Contact guard assistance  Stand to Sit: Contact guard assistance         pt required increase time for management of lines. 2 drains to collection bags, 1 drain to suction, and wound vac. Balance:  Sitting: Intact  Standing: Impaired  Standing - Static: Good  Standing - Dynamic : Fair  Ambulation/Gait Training:  Distance (ft): 6 Feet (ft)  Assistive Device: Gait belt;Walker, rolling (bariatric )  Ambulation - Level of Assistance: Contact guard assistance;Assist x2        Gait Abnormalities: Antalgic;Decreased step clearance        Base of Support: Widened     Speed/Liz: Slow  Step Length: Left shortened;Right shortened                    Stairs:              Neuro Re-Education:     Therapeutic Exercises:      Pain:  Pain Scale 1: Numeric (0 - 10)  Pain Intensity 1: 0              Activity Tolerance:   Limited   Please refer to the flowsheet for vital signs taken during this treatment.   After treatment:   [X] Patient left in no apparent distress sitting up in chair  [ ] Patient left in no apparent distress in bed  [X] Call bell left within reach  [X] Nursing notified  [ ] Caregiver present  [ ] Bed alarm activated      COMMUNICATION/COLLABORATION:   The patients plan of care was discussed with: Registered Nurse     Veneda Felty, PTA   Time Calculation: 25 mins

## 2017-07-14 ENCOUNTER — TELEPHONE (OUTPATIENT)
Dept: SURGERY | Age: 42
End: 2017-07-14

## 2017-07-14 LAB
ANION GAP BLD CALC-SCNC: 10 MMOL/L (ref 5–15)
BASOPHILS # BLD AUTO: 0 K/UL (ref 0–0.1)
BASOPHILS # BLD: 0 % (ref 0–1)
BUN SERPL-MCNC: 28 MG/DL (ref 6–20)
BUN/CREAT SERPL: 22 (ref 12–20)
CALCIUM SERPL-MCNC: 8.6 MG/DL (ref 8.5–10.1)
CHLORIDE SERPL-SCNC: 105 MMOL/L (ref 97–108)
CO2 SERPL-SCNC: 23 MMOL/L (ref 21–32)
CREAT SERPL-MCNC: 1.3 MG/DL (ref 0.55–1.02)
DIFFERENTIAL METHOD BLD: ABNORMAL
EOSINOPHIL # BLD: 0.9 K/UL (ref 0–0.4)
EOSINOPHIL NFR BLD: 7 % (ref 0–7)
ERYTHROCYTE [DISTWIDTH] IN BLOOD BY AUTOMATED COUNT: 15.7 % (ref 11.5–14.5)
GLUCOSE BLD STRIP.AUTO-MCNC: 113 MG/DL (ref 65–100)
GLUCOSE BLD STRIP.AUTO-MCNC: 154 MG/DL (ref 65–100)
GLUCOSE BLD STRIP.AUTO-MCNC: 174 MG/DL (ref 65–100)
GLUCOSE BLD STRIP.AUTO-MCNC: 196 MG/DL (ref 65–100)
GLUCOSE BLD STRIP.AUTO-MCNC: 93 MG/DL (ref 65–100)
GLUCOSE SERPL-MCNC: 130 MG/DL (ref 65–100)
HCT VFR BLD AUTO: 26.2 % (ref 35–47)
HGB BLD-MCNC: 8.3 G/DL (ref 11.5–16)
LYMPHOCYTES # BLD AUTO: 32 % (ref 12–49)
LYMPHOCYTES # BLD: 4.1 K/UL (ref 0.8–3.5)
MAGNESIUM SERPL-MCNC: 2 MG/DL (ref 1.6–2.4)
MCH RBC QN AUTO: 28.7 PG (ref 26–34)
MCHC RBC AUTO-ENTMCNC: 31.7 G/DL (ref 30–36.5)
MCV RBC AUTO: 90.7 FL (ref 80–99)
MONOCYTES # BLD: 0.4 K/UL (ref 0–1)
MONOCYTES NFR BLD AUTO: 3 % (ref 5–13)
NEUTS BAND NFR BLD MANUAL: 1 % (ref 0–6)
NEUTS SEG # BLD: 7.4 K/UL (ref 1.8–8)
NEUTS SEG NFR BLD AUTO: 57 % (ref 32–75)
PHOSPHATE SERPL-MCNC: 4.7 MG/DL (ref 2.6–4.7)
PLATELET # BLD AUTO: 466 K/UL (ref 150–400)
POTASSIUM SERPL-SCNC: 3.7 MMOL/L (ref 3.5–5.1)
RBC # BLD AUTO: 2.89 M/UL (ref 3.8–5.2)
RBC MORPH BLD: ABNORMAL
SERVICE CMNT-IMP: ABNORMAL
SERVICE CMNT-IMP: NORMAL
SODIUM SERPL-SCNC: 138 MMOL/L (ref 136–145)
WBC # BLD AUTO: 12.8 K/UL (ref 3.6–11)

## 2017-07-14 PROCEDURE — 74011636637 HC RX REV CODE- 636/637: Performed by: SURGERY

## 2017-07-14 PROCEDURE — 74011000250 HC RX REV CODE- 250: Performed by: SURGERY

## 2017-07-14 PROCEDURE — 74011250636 HC RX REV CODE- 250/636: Performed by: INTERNAL MEDICINE

## 2017-07-14 PROCEDURE — 74011250637 HC RX REV CODE- 250/637: Performed by: SURGERY

## 2017-07-14 PROCEDURE — 3331090002 HH PPS REVENUE DEBIT

## 2017-07-14 PROCEDURE — C9113 INJ PANTOPRAZOLE SODIUM, VIA: HCPCS | Performed by: SURGERY

## 2017-07-14 PROCEDURE — 74011250636 HC RX REV CODE- 250/636: Performed by: SURGERY

## 2017-07-14 PROCEDURE — 84100 ASSAY OF PHOSPHORUS: CPT | Performed by: SURGERY

## 2017-07-14 PROCEDURE — 82962 GLUCOSE BLOOD TEST: CPT

## 2017-07-14 PROCEDURE — 74011000258 HC RX REV CODE- 258: Performed by: INTERNAL MEDICINE

## 2017-07-14 PROCEDURE — 77030019952 HC CANSTR VAC ASST KCON -B

## 2017-07-14 PROCEDURE — 74011250636 HC RX REV CODE- 250/636: Performed by: NURSE PRACTITIONER

## 2017-07-14 PROCEDURE — 97606 NEG PRS WND THER DME>50 SQCM: CPT

## 2017-07-14 PROCEDURE — 77030018717 HC DRSG GRNUFM KCON -B

## 2017-07-14 PROCEDURE — 3331090001 HH PPS REVENUE CREDIT

## 2017-07-14 PROCEDURE — 74011000258 HC RX REV CODE- 258: Performed by: SURGERY

## 2017-07-14 PROCEDURE — 36415 COLL VENOUS BLD VENIPUNCTURE: CPT | Performed by: SURGERY

## 2017-07-14 PROCEDURE — 85025 COMPLETE CBC W/AUTO DIFF WBC: CPT | Performed by: SURGERY

## 2017-07-14 PROCEDURE — 80048 BASIC METABOLIC PNL TOTAL CA: CPT | Performed by: SURGERY

## 2017-07-14 PROCEDURE — 83735 ASSAY OF MAGNESIUM: CPT | Performed by: SURGERY

## 2017-07-14 PROCEDURE — 74011000250 HC RX REV CODE- 250: Performed by: NURSE PRACTITIONER

## 2017-07-14 PROCEDURE — 65660000000 HC RM CCU STEPDOWN

## 2017-07-14 PROCEDURE — 74011250636 HC RX REV CODE- 250/636: Performed by: PHYSICAL MEDICINE & REHABILITATION

## 2017-07-14 PROCEDURE — 74011000250 HC RX REV CODE- 250: Performed by: INTERNAL MEDICINE

## 2017-07-14 RX ADMIN — Medication 1 MG: at 14:23

## 2017-07-14 RX ADMIN — SODIUM CHLORIDE 100 MG: 900 INJECTION, SOLUTION INTRAVENOUS at 18:22

## 2017-07-14 RX ADMIN — I.V. FAT EMULSION 500 ML: 20 EMULSION INTRAVENOUS at 18:34

## 2017-07-14 RX ADMIN — SODIUM CHLORIDE 5 MG: 9 INJECTION INTRAMUSCULAR; INTRAVENOUS; SUBCUTANEOUS at 09:10

## 2017-07-14 RX ADMIN — PIPERACILLIN SODIUM,TAZOBACTAM SODIUM 3.38 G: 3; .375 INJECTION, POWDER, FOR SOLUTION INTRAVENOUS at 21:12

## 2017-07-14 RX ADMIN — HYDROMORPHONE HYDROCHLORIDE 1 MG: 1 INJECTION, SOLUTION INTRAMUSCULAR; INTRAVENOUS; SUBCUTANEOUS at 02:39

## 2017-07-14 RX ADMIN — HYDROMORPHONE HYDROCHLORIDE 1 MG: 1 INJECTION, SOLUTION INTRAMUSCULAR; INTRAVENOUS; SUBCUTANEOUS at 18:22

## 2017-07-14 RX ADMIN — Medication: at 16:18

## 2017-07-14 RX ADMIN — Medication 10 ML: at 21:12

## 2017-07-14 RX ADMIN — HUMAN INSULIN 2 UNITS: 100 INJECTION, SOLUTION SUBCUTANEOUS at 07:19

## 2017-07-14 RX ADMIN — Medication 1 MG: at 07:10

## 2017-07-14 RX ADMIN — HYDROMORPHONE HYDROCHLORIDE 1 MG: 1 INJECTION, SOLUTION INTRAMUSCULAR; INTRAVENOUS; SUBCUTANEOUS at 23:52

## 2017-07-14 RX ADMIN — OCTREOTIDE ACETATE 50 MCG: 50 INJECTION, SOLUTION INTRAVENOUS; SUBCUTANEOUS at 18:21

## 2017-07-14 RX ADMIN — Medication 10 ML: at 14:23

## 2017-07-14 RX ADMIN — HYDROMORPHONE HYDROCHLORIDE 1 MG: 1 INJECTION, SOLUTION INTRAMUSCULAR; INTRAVENOUS; SUBCUTANEOUS at 03:50

## 2017-07-14 RX ADMIN — HUMAN INSULIN 2 UNITS: 100 INJECTION, SOLUTION SUBCUTANEOUS at 00:12

## 2017-07-14 RX ADMIN — Medication 10 ML: at 14:22

## 2017-07-14 RX ADMIN — CLOPIDOGREL BISULFATE 75 MG: 75 TABLET ORAL at 09:11

## 2017-07-14 RX ADMIN — HYDROMORPHONE HYDROCHLORIDE 1 MG: 1 INJECTION, SOLUTION INTRAMUSCULAR; INTRAVENOUS; SUBCUTANEOUS at 09:11

## 2017-07-14 RX ADMIN — CALCIUM GLUCONATE: 94 INJECTION, SOLUTION INTRAVENOUS at 18:44

## 2017-07-14 RX ADMIN — HYDROMORPHONE HYDROCHLORIDE 1 MG: 1 INJECTION, SOLUTION INTRAMUSCULAR; INTRAVENOUS; SUBCUTANEOUS at 16:18

## 2017-07-14 RX ADMIN — ENOXAPARIN SODIUM 40 MG: 40 INJECTION SUBCUTANEOUS at 23:52

## 2017-07-14 RX ADMIN — Medication 1 MG: at 21:12

## 2017-07-14 RX ADMIN — HYDROMORPHONE HYDROCHLORIDE 1 MG: 1 INJECTION, SOLUTION INTRAMUSCULAR; INTRAVENOUS; SUBCUTANEOUS at 06:03

## 2017-07-14 RX ADMIN — SODIUM CHLORIDE 5 MG: 9 INJECTION INTRAMUSCULAR; INTRAVENOUS; SUBCUTANEOUS at 23:52

## 2017-07-14 RX ADMIN — VANCOMYCIN HYDROCHLORIDE 1250 MG: 10 INJECTION, POWDER, LYOPHILIZED, FOR SOLUTION INTRAVENOUS at 13:07

## 2017-07-14 RX ADMIN — OCTREOTIDE ACETATE 50 MCG: 50 INJECTION, SOLUTION INTRAVENOUS; SUBCUTANEOUS at 09:12

## 2017-07-14 RX ADMIN — OCTREOTIDE ACETATE 50 MCG: 50 INJECTION, SOLUTION INTRAVENOUS; SUBCUTANEOUS at 23:52

## 2017-07-14 RX ADMIN — ASPIRIN 81 MG CHEWABLE TABLET 81 MG: 81 TABLET CHEWABLE at 09:11

## 2017-07-14 RX ADMIN — PIPERACILLIN SODIUM,TAZOBACTAM SODIUM 3.38 G: 3; .375 INJECTION, POWDER, FOR SOLUTION INTRAVENOUS at 14:22

## 2017-07-14 RX ADMIN — HYDROMORPHONE HYDROCHLORIDE 1 MG: 1 INJECTION, SOLUTION INTRAMUSCULAR; INTRAVENOUS; SUBCUTANEOUS at 01:03

## 2017-07-14 RX ADMIN — Medication: at 03:06

## 2017-07-14 RX ADMIN — PIPERACILLIN SODIUM,TAZOBACTAM SODIUM 3.38 G: 3; .375 INJECTION, POWDER, FOR SOLUTION INTRAVENOUS at 07:09

## 2017-07-14 RX ADMIN — SODIUM CHLORIDE 40 MG: 9 INJECTION INTRAMUSCULAR; INTRAVENOUS; SUBCUTANEOUS at 09:10

## 2017-07-14 NOTE — PROGRESS NOTES
Bedside and Verbal shift change report given to April (oncoming nurse) by Magen Cantor (offgoing nurse). Report included the following information SBAR, Kardex and Cardiac Rhythm Sinus tach.

## 2017-07-14 NOTE — PROGRESS NOTES
Daily Progress Note  [facility] General Surgery at [address] Date: 2017  Post-Operative Day: 14   17: Exploratory laparotomy with lysis of adhesions for 2 hours, Fistula exclusion with feeding tube placement, Wound vacuum-assisted closure placement - Dr. Susana Palacio  17: Exploratory laparotomy with extensive lysis of adhesions greater than 2 hours, repair of enterotomy. 2. SMA stent on the left. - Dr. Hiral Luke and Dr. Efren Faria    Subjective:     Last 24 hrs: No complaints. Currently having vac changed, would examined with Yung Ramos. She continues on TPN/LR,  Eraxis, zosyn, Vancomycin. Objective:     Blood pressure 108/50, pulse 88, temperature 97.8 °F (36.6 °C), resp. rate 20, height 5' 4\" (1.626 m), weight 149.8 kg (330 lb 4 oz), last menstrual period 2017, SpO2 99 %, not currently breastfeeding. Temp (24hrs), Av.1 °F (36.7 °C), Min:97.8 °F (36.6 °C), Max:98.4 °F (36.9 °C)      _____________________  Physical Exam:     Alert and Oriented, lying in bed, in no acute distress. Cardiovascular: RRR  Lungs:CTAB   Abdomen: midline wound with enteric drainage and visible bowel with peristalsis in central opening in fascia in base of wound. Sides of wound are pink and healing nicely. Assessment:   Active Problems:    Abdominal pain (2017)      Small bowel ischemia ([address] (2017)      Overview: CT abd/pelvis 17      1. The stomach and proximal small bowel loops are distended. There is a       loop of      small bowel in the midabdomen which is mildly dilated and does not       demonstrate      enhancement in the wall and there is suspicion of pneumatosis and this       leads to      a loop of small bowel which demonstrates thickening of the wall and       findings are      suspicious for ischemia. 2. There is extensive mesenteric edema and a small amount of ascites in       the      pelvis.       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 There is nonocclusive thrombus in the SMA. 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 Rogelio Osullivan, [address] [facility] [address],  [place], [address] [place], [place]  [phone]    Data Review:    Recent Labs      07/14/17 0315 07/13/17 0445 07/12/17   0330   WBC  12.8*  17.5*  16.5*   HGB  8.3*  9.1*  9.0*   HCT  26.2*  29.1*  28.1*   PLT  466*  533*  498*     Recent Labs      07/14/17 0315 07/13/17 0445 07/12/17   0330   NA  138  140  140   K  3.7  4.3  4.1   CL  105  107  108   CO2  23  24  24   GLU  130*  129*  133*   BUN  28*  26*  21*   CREA  1.30*  1.31*  1.32*   CA  8.6  8.9  8.5   MG  2.0  1.8  1.8   PHOS  4.7  4.0  4.1     No results for input(s): AML, LPSE in the last 72 hours.         ______________________  Medications:    Current Facility-Administered Medications   Medication Dose Route Frequency    TPN ADULT - CENTRAL   IntraVENous TITRATE    vancomycin (VANCOCIN) 1250 mg in  ml infusion  1,250 mg IntraVENous Q24H    clopidogrel (PLAVIX) tablet 75 mg  75 mg Oral DAILY    alteplase (CATHFLO) 1 mg in sterile water (preservative free) 1 mL injection  1 mg InterCATHeter PRN    sodium chloride (NS) flush 10 mL  10 mL InterCATHeter Q24H    sodium chloride (NS) flush 10 mL  10 mL InterCATHeter PRN    sodium chloride (NS) flush 10-40 mL  10-40 mL InterCATHeter Q8H    alteplase (CATHFLO) 1 mg in sterile water (preservative free) 1 mL injection  1 mg InterCATHeter PRN    bacitracin 500 unit/gram packet 1 Packet  1 Packet Topical PRN    0.9% sodium chloride infusion 250 mL  250 mL IntraVENous PRN    lactated Ringers infusion  50 mL/hr IntraVENous CONTINUOUS    fat emulsion 20% (LIPOSYN, INTRALIPID) infusion 500 mL  500 mL IntraVENous Q MON, WED & FRI    LORazepam (ATIVAN) injection 1 mg  1 mg IntraVENous Q8H    HYDROmorphone (PF) (DILAUDID) injection 1 mg  1 mg IntraVENous Q1H PRN    aspirin chewable tablet 81 mg  81 mg Oral DAILY    octreotide (SANDOSTATIN) injection 50 mcg  50 mcg IntraVENous TID    insulin regular (NOVOLIN R, HUMULIN R) injection   SubCUTAneous Q6H    morphine (PF)  mg/30 ml   IntraVENous CONTINUOUS    glucose chewable tablet 16 g  4 Tab Oral PRN    glucagon (GLUCAGEN) injection 1 mg  1 mg IntraMUSCular PRN    dextrose 10 % infusion 125-250 mL  125-250 mL IntraVENous PRN    pantoprazole (PROTONIX) 40 mg in sodium chloride 0.9 % 10 mL injection  40 mg IntraVENous DAILY    albuterol-ipratropium (DUO-NEB) 2.5 MG-0.5 MG/3 ML  3 mL Nebulization Q6H PRN    prochlorperazine (COMPAZINE) with saline injection 5 mg  5 mg IntraVENous Q6H PRN    anidulafungin (ERAXIS) 100 mg in 0.9% sodium chloride 130 mL IVPB  100 mg IntraVENous Q24H    ondansetron (ZOFRAN) injection 8 mg  8 mg IntraVENous Q6H PRN    sodium chloride (NS) flush 5-10 mL  5-10 mL IntraVENous Q8H    sodium chloride (NS) flush 5-10 mL  5-10 mL IntraVENous PRN    naloxone (NARCAN) injection 0.4 mg  0.4 mg IntraVENous PRN    diphenhydrAMINE (BENADRYL) injection 12.5 mg  12.5 mg IntraVENous Q4H PRN    enoxaparin (LOVENOX) injection 40 mg  40 mg SubCUTAneous Q24H    piperacillin-tazobactam (ZOSYN) 3.375 g in 0.9% sodium chloride (MBP/ADV) 100 mL  3.375 g IntraVENous Q8H    Vancomycin ---Pharmacy to Dose   Other Rx Dosing/Monitoring         I have independently examined the patient and have reviewed the chart. I agree with the above plan. The wound was examined at the bed side with wound care nursing. There is some bowel in the wound and but no evisceration. Her abdomen is frozen and the fistulas are controlled with the drains. She will heal this wound wound secondary intention with [address] and local wound care and drainage. TPN is renewed. Cont pain medication.       Shailesh England MD

## 2017-07-14 NOTE — PROGRESS NOTES
ID Progress Note  2017    Subjective:     Seems to be having an ok day    Objective:     Antibiotics:  1. Vancomycin  2. Zosyn   3. Eraxis      Vitals:   Visit Vitals    /63 (BP 1 Location: Right arm, BP Patient Position: At rest)    Pulse 98    Temp 97.8 °F (36.6 °C)    Resp 20    Ht 5' 4\" (1.626 m)    Wt 149.8 kg (330 lb 4 oz)    LMP 2017 (Approximate)    SpO2 100%    Breastfeeding No    BMI 56.69 kg/m2        Tmax:  Temp (24hrs), Av °F (36.7 °C), Min:97.8 °F (36.6 °C), Max:98.4 °F (36.9 °C)      Exam:  Lungs:  clear to auscultation bilaterally  Heart:  regularly irregular rhythm  Abdomen:  abnormal findings:  tenderness moderate in the entire abdomen, hypoactive bowel sounds    Labs:      Recent Labs      17   0315  17   0445  17   0330   WBC  12.8*  17.5*  16.5*   HGB  8.3*  9.1*  9.0*   PLT  466*  533*  498*   BUN  28*  26*  21*   CREA  1.30*  1.31*  1.32*       Cultures:     Lab Results   Component Value Date/Time    Specimen Description: URINE 2009 07:45 PM     Lab Results   Component Value Date/Time    Culture result: NO GROWTH ON SOLID MEDIA AT 4 DAYS 2017 12:17 PM    Culture result:  2017 12:17 PM     **METHICILLIN RESISTANT STAPHYLOCOCCUS AUREUS**  ISOLATED FROM THIO BROTH ONLY      Culture result: NO GROWTH 5 DAYS 2017 02:35 PM       Radiology:     Line/Insert Date:           Assessment:     1. Ischemic gut with frozen abdomen  2. Leukocytosis--back up after surgery--trending back down  3. Cultures negative to date    Objective:     1. Continue IV therapy  2. Follow up cultures and studies  3. Work up fevers  4.  Group will see over the weekend if asked    Taylor Schmitt MD

## 2017-07-14 NOTE — WOUND CARE
WOCN Note:     Follow-up visit for VAC change. Seen with Dr. Diony Mullen and Kali Galvez NP. Chart shows:  Admitted for nausea, vomiting, and abdominal pain; history of Crohn's, DVT, bowel perforation with surgery 6/7/17. Exploratory lap, KATHRINE, fistula exclusion and tube placement by Dr. Luz Elena Davila 7/7/17. WBC = 12.8  On zosyn & Vancomycin     Assessment:   Patient is A&O x 4 and mobile. Bed: total care bariatric with air mattress   Diet: TPN  Pure Mary Jane Mitts in use & draining clear yellow. Using PCA and tolerates VAC change fairly well.      Mucus fistula LLQ: red & Moist and almost at skin level; some mucosal transplantation noted; output is light green mucus. Pouch changed using Activelife pouch.      1. Midline abdominal incision with three drains visible in the base = 21.5 x 12.5 x 5.5 cm  At 2 o'clock there is undermining toward drain tubing ~ 3 cm. Base is 10% moist red mucosal tissue with peristalsis, 20% scattered moist yellow 50% julia burgundy, 20% granular walls up to margin. No exudate in cannister. RUQ drain to bedside bag clogged with yellow mucus. LUQ drain to bedside bag with green effluent. RLQ drain with end freely floating in wound to wall suction and with ~200cc of green liquid. Periwound intact & without erythema. Dressed using 2 pieces of petroleum gauze as base layer over most of wound bed, 1 white sponge (RLQ drain rests on top of white sponge), 1 black sponge. Suction is achieved via wall suction but track pad placed and connected to I VAC for backup in case wall suction fails/clogs.        Recommendations:    Please maintain VAC as ordered @ 25 mmHg continuous suction. Skin Care & Pressure Prevention:  Minimize layers of linen/pads under patient to optimize support surface. Turn/reposition approximately every 2 hours and offload heels. Discussed above plan with patient & RN, April.     Transition of Care: Plan to follow as needed while admitted to hospital with [facility] changes Tuesdays and Fridays.      YOLANDA WattsN, RN, [facility]  Certified Wound, Ostomy, Continence Nurse  office [phone]  pager [phone] or call  to page

## 2017-07-14 NOTE — PROGRESS NOTES
Bedside shift change report given to Brittaney Silva RN (oncoming nurse) by Andrea Pal RN (offgoing nurse). Report included the following information SBAR, Kardex and Recent Results.

## 2017-07-15 LAB
ANION GAP BLD CALC-SCNC: 8 MMOL/L (ref 5–15)
BASOPHILS # BLD AUTO: 0 K/UL (ref 0–0.1)
BASOPHILS # BLD: 0 % (ref 0–1)
BUN SERPL-MCNC: 25 MG/DL (ref 6–20)
BUN/CREAT SERPL: 21 (ref 12–20)
CALCIUM SERPL-MCNC: 8.1 MG/DL (ref 8.5–10.1)
CHLORIDE SERPL-SCNC: 108 MMOL/L (ref 97–108)
CO2 SERPL-SCNC: 24 MMOL/L (ref 21–32)
CREAT SERPL-MCNC: 1.18 MG/DL (ref 0.55–1.02)
DATE LAST DOSE: ABNORMAL
EOSINOPHIL # BLD: 0.8 K/UL (ref 0–0.4)
EOSINOPHIL NFR BLD: 5 % (ref 0–7)
ERYTHROCYTE [DISTWIDTH] IN BLOOD BY AUTOMATED COUNT: 15.6 % (ref 11.5–14.5)
GLUCOSE BLD STRIP.AUTO-MCNC: 158 MG/DL (ref 65–100)
GLUCOSE BLD STRIP.AUTO-MCNC: 180 MG/DL (ref 65–100)
GLUCOSE BLD STRIP.AUTO-MCNC: 92 MG/DL (ref 65–100)
GLUCOSE BLD STRIP.AUTO-MCNC: 95 MG/DL (ref 65–100)
GLUCOSE SERPL-MCNC: 139 MG/DL (ref 65–100)
HCT VFR BLD AUTO: 27.1 % (ref 35–47)
HGB BLD-MCNC: 8.5 G/DL (ref 11.5–16)
LYMPHOCYTES # BLD AUTO: 34 % (ref 12–49)
LYMPHOCYTES # BLD: 4.9 K/UL (ref 0.8–3.5)
MAGNESIUM SERPL-MCNC: 1.8 MG/DL (ref 1.6–2.4)
MCH RBC QN AUTO: 28.4 PG (ref 26–34)
MCHC RBC AUTO-ENTMCNC: 31.4 G/DL (ref 30–36.5)
MCV RBC AUTO: 90.6 FL (ref 80–99)
MONOCYTES # BLD: 1.3 K/UL (ref 0–1)
MONOCYTES NFR BLD AUTO: 9 % (ref 5–13)
NEUTS SEG # BLD: 7.5 K/UL (ref 1.8–8)
NEUTS SEG NFR BLD AUTO: 52 % (ref 32–75)
PHOSPHATE SERPL-MCNC: 3.6 MG/DL (ref 2.6–4.7)
PLATELET # BLD AUTO: 463 K/UL (ref 150–400)
POTASSIUM SERPL-SCNC: 3.9 MMOL/L (ref 3.5–5.1)
RBC # BLD AUTO: 2.99 M/UL (ref 3.8–5.2)
REPORTED DOSE,DOSE: ABNORMAL UNITS
REPORTED DOSE/TIME,TMG: ABNORMAL
SERVICE CMNT-IMP: ABNORMAL
SERVICE CMNT-IMP: ABNORMAL
SERVICE CMNT-IMP: NORMAL
SERVICE CMNT-IMP: NORMAL
SODIUM SERPL-SCNC: 140 MMOL/L (ref 136–145)
VANCOMYCIN TROUGH SERPL-MCNC: 12.1 UG/ML (ref 5–10)
WBC # BLD AUTO: 14.5 K/UL (ref 3.6–11)

## 2017-07-15 PROCEDURE — 74011636637 HC RX REV CODE- 636/637: Performed by: SURGERY

## 2017-07-15 PROCEDURE — 80202 ASSAY OF VANCOMYCIN: CPT | Performed by: SURGERY

## 2017-07-15 PROCEDURE — 65660000000 HC RM CCU STEPDOWN

## 2017-07-15 PROCEDURE — 3331090002 HH PPS REVENUE DEBIT

## 2017-07-15 PROCEDURE — 74011250636 HC RX REV CODE- 250/636: Performed by: PHYSICAL MEDICINE & REHABILITATION

## 2017-07-15 PROCEDURE — 74011250636 HC RX REV CODE- 250/636: Performed by: INTERNAL MEDICINE

## 2017-07-15 PROCEDURE — 74011000250 HC RX REV CODE- 250: Performed by: SURGERY

## 2017-07-15 PROCEDURE — 80048 BASIC METABOLIC PNL TOTAL CA: CPT | Performed by: SURGERY

## 2017-07-15 PROCEDURE — 74011250636 HC RX REV CODE- 250/636: Performed by: SURGERY

## 2017-07-15 PROCEDURE — 85025 COMPLETE CBC W/AUTO DIFF WBC: CPT | Performed by: SURGERY

## 2017-07-15 PROCEDURE — C9113 INJ PANTOPRAZOLE SODIUM, VIA: HCPCS | Performed by: SURGERY

## 2017-07-15 PROCEDURE — 74011000258 HC RX REV CODE- 258: Performed by: SURGERY

## 2017-07-15 PROCEDURE — 74011000258 HC RX REV CODE- 258: Performed by: NURSE PRACTITIONER

## 2017-07-15 PROCEDURE — 36415 COLL VENOUS BLD VENIPUNCTURE: CPT | Performed by: SURGERY

## 2017-07-15 PROCEDURE — 74011636637 HC RX REV CODE- 636/637: Performed by: NURSE PRACTITIONER

## 2017-07-15 PROCEDURE — 74011000250 HC RX REV CODE- 250: Performed by: NURSE PRACTITIONER

## 2017-07-15 PROCEDURE — 74011250636 HC RX REV CODE- 250/636: Performed by: NURSE PRACTITIONER

## 2017-07-15 PROCEDURE — 74011000258 HC RX REV CODE- 258: Performed by: INTERNAL MEDICINE

## 2017-07-15 PROCEDURE — 82962 GLUCOSE BLOOD TEST: CPT

## 2017-07-15 PROCEDURE — 74011250637 HC RX REV CODE- 250/637: Performed by: SURGERY

## 2017-07-15 PROCEDURE — 3331090001 HH PPS REVENUE CREDIT

## 2017-07-15 PROCEDURE — 83735 ASSAY OF MAGNESIUM: CPT | Performed by: SURGERY

## 2017-07-15 PROCEDURE — 84100 ASSAY OF PHOSPHORUS: CPT | Performed by: SURGERY

## 2017-07-15 RX ORDER — OCTREOTIDE ACETATE 100 UG/ML
75 INJECTION, SOLUTION INTRAVENOUS; SUBCUTANEOUS 3 TIMES DAILY
Status: DISCONTINUED | OUTPATIENT
Start: 2017-07-15 | End: 2017-07-17

## 2017-07-15 RX ADMIN — HUMAN INSULIN 2 UNITS: 100 INJECTION, SOLUTION SUBCUTANEOUS at 07:54

## 2017-07-15 RX ADMIN — Medication 10 ML: at 17:04

## 2017-07-15 RX ADMIN — Medication 10 ML: at 06:51

## 2017-07-15 RX ADMIN — PIPERACILLIN SODIUM,TAZOBACTAM SODIUM 3.38 G: 3; .375 INJECTION, POWDER, FOR SOLUTION INTRAVENOUS at 11:34

## 2017-07-15 RX ADMIN — Medication 10 ML: at 21:27

## 2017-07-15 RX ADMIN — HUMAN INSULIN 2 UNITS: 100 INJECTION, SOLUTION SUBCUTANEOUS at 23:47

## 2017-07-15 RX ADMIN — VANCOMYCIN HYDROCHLORIDE 1250 MG: 10 INJECTION, POWDER, LYOPHILIZED, FOR SOLUTION INTRAVENOUS at 14:25

## 2017-07-15 RX ADMIN — Medication: at 03:55

## 2017-07-15 RX ADMIN — Medication 1 MG: at 14:00

## 2017-07-15 RX ADMIN — PIPERACILLIN SODIUM,TAZOBACTAM SODIUM 3.38 G: 3; .375 INJECTION, POWDER, FOR SOLUTION INTRAVENOUS at 06:28

## 2017-07-15 RX ADMIN — HYDROMORPHONE HYDROCHLORIDE 1 MG: 1 INJECTION, SOLUTION INTRAMUSCULAR; INTRAVENOUS; SUBCUTANEOUS at 03:55

## 2017-07-15 RX ADMIN — CALCIUM GLUCONATE: 94 INJECTION, SOLUTION INTRAVENOUS at 17:40

## 2017-07-15 RX ADMIN — Medication 10 ML: at 06:52

## 2017-07-15 RX ADMIN — HUMAN INSULIN 2 UNITS: 100 INJECTION, SOLUTION SUBCUTANEOUS at 00:18

## 2017-07-15 RX ADMIN — Medication 10 ML: at 11:42

## 2017-07-15 RX ADMIN — PIPERACILLIN SODIUM,TAZOBACTAM SODIUM 3.38 G: 3; .375 INJECTION, POWDER, FOR SOLUTION INTRAVENOUS at 21:27

## 2017-07-15 RX ADMIN — Medication 1 MG: at 21:26

## 2017-07-15 RX ADMIN — Medication: at 14:16

## 2017-07-15 RX ADMIN — SODIUM CHLORIDE 100 MG: 900 INJECTION, SOLUTION INTRAVENOUS at 17:36

## 2017-07-15 RX ADMIN — ASPIRIN 81 MG CHEWABLE TABLET 81 MG: 81 TABLET CHEWABLE at 09:27

## 2017-07-15 RX ADMIN — OCTREOTIDE ACETATE 75 MCG: 100 INJECTION, SOLUTION INTRAVENOUS; SUBCUTANEOUS at 17:01

## 2017-07-15 RX ADMIN — Medication 1 MG: at 06:52

## 2017-07-15 RX ADMIN — SODIUM CHLORIDE, SODIUM LACTATE, POTASSIUM CHLORIDE, AND CALCIUM CHLORIDE 50 ML/HR: 600; 310; 30; 20 INJECTION, SOLUTION INTRAVENOUS at 09:26

## 2017-07-15 RX ADMIN — ENOXAPARIN SODIUM 40 MG: 40 INJECTION SUBCUTANEOUS at 23:47

## 2017-07-15 RX ADMIN — Medication 10 ML: at 17:43

## 2017-07-15 RX ADMIN — Medication: at 22:07

## 2017-07-15 RX ADMIN — ONDANSETRON 8 MG: 2 INJECTION INTRAMUSCULAR; INTRAVENOUS at 17:38

## 2017-07-15 RX ADMIN — CLOPIDOGREL BISULFATE 75 MG: 75 TABLET ORAL at 09:27

## 2017-07-15 RX ADMIN — OCTREOTIDE ACETATE 50 MCG: 50 INJECTION, SOLUTION INTRAVENOUS; SUBCUTANEOUS at 09:27

## 2017-07-15 RX ADMIN — Medication 20 ML: at 14:24

## 2017-07-15 RX ADMIN — SODIUM CHLORIDE 40 MG: 9 INJECTION INTRAMUSCULAR; INTRAVENOUS; SUBCUTANEOUS at 09:26

## 2017-07-15 NOTE — PROGRESS NOTES
General Surgery Daily Progress Note    Admit Date: 2017  Post-Operative Day: 2 Days Post-Op from Procedure(s) with comments:  SPECIAL PROCEDURE OUTSIDE OF OR - central line placement     Subjective:     Last 24 hrs: Pt is awake and using IS. No c/o pain - good control w/ PCA. Wound vac changed yesterday. WC note read. On Eraxis, vanc, zosyn per ID    Objective:     Blood pressure 135/67, pulse 97, temperature 98.3 °F (36.8 °C), resp. rate 17, height 5' 4\" (1.626 m), weight 334 lb 7 oz (151.7 kg), last menstrual period 2017, SpO2 99 %, not currently breastfeeding. Temp (24hrs), Av.4 °F (36.9 °C), Min:97.8 °F (36.6 °C), Max:98.6 °F (37 °C)      _____________________  Physical Exam:     Alert and Oriented, x3, in no acute distress. Cardiovascular: RRR, no peripheral edema  Abdomen: soft, wound vac intact; abd drains intact; fred to wall sxn - all drains w/ gastric-appearing contents      Assessment:   Active Problems:    Abdominal pain (2017)      Small bowel ischemia ([address] (2017)      Overview: CT abd/pelvis 17      1. The stomach and proximal small bowel loops are distended. There is a       loop of      small bowel in the midabdomen which is mildly dilated and does not       demonstrate      enhancement in the wall and there is suspicion of pneumatosis and this       leads to      a loop of small bowel which demonstrates thickening of the wall and       findings are      suspicious for ischemia. 2. There is extensive mesenteric edema and a small amount of ascites in       the      pelvis. 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 There is nonocclusive thrombus in the SMA.             Enterocutaneous fistula (2017)            Plan:     Renew TPN  Gi/dvt proph  Asa and plavix  Cont drain mgmt  Cont to encourage IS  Increase octreotide to 75mcg  Daily labs    Data Review:    Recent Labs      07/15/17 0350  07/14/17   0315  07/13/17   0445   WBC  14.5*  12.8*  17.5*   HGB  8.5*  8.3*  9.1*   HCT  27.1*  26.2*  29.1*   PLT  463*  466*  533*     Recent Labs      07/15/17   0350  07/14/17   0315  07/13/17   0445   NA  140  138  140   K  3.9  3.7  4.3   CL  108  105  107   CO2  24  23  24   GLU  139*  130*  129*   BUN  25*  28*  26*   CREA  1.18*  1.30*  1.31*   CA  8.1*  8.6  8.9   MG  1.8  2.0  1.8   PHOS  3.6  4.7  4.0     No results for input(s): AML, LPSE in the last 72 hours.         ______________________  Medications:    Current Facility-Administered Medications   Medication Dose Route Frequency    Vancomycin level due @1300 today, 7/15/17   Other ONCE    vancomycin (VANCOCIN) 1250 mg in  ml infusion  1,250 mg IntraVENous Q24H    clopidogrel (PLAVIX) tablet 75 mg  75 mg Oral DAILY    alteplase (CATHFLO) 1 mg in sterile water (preservative free) 1 mL injection  1 mg InterCATHeter PRN    sodium chloride (NS) flush 10 mL  10 mL InterCATHeter Q24H    sodium chloride (NS) flush 10 mL  10 mL InterCATHeter PRN    sodium chloride (NS) flush 10-40 mL  10-40 mL InterCATHeter Q8H    alteplase (CATHFLO) 1 mg in sterile water (preservative free) 1 mL injection  1 mg InterCATHeter PRN    bacitracin 500 unit/gram packet 1 Packet  1 Packet Topical PRN    0.9% sodium chloride infusion 250 mL  250 mL IntraVENous PRN    lactated Ringers infusion  50 mL/hr IntraVENous CONTINUOUS    fat emulsion 20% (LIPOSYN, INTRALIPID) infusion 500 mL  500 mL IntraVENous Q MON, WED & FRI    LORazepam (ATIVAN) injection 1 mg  1 mg IntraVENous Q8H    HYDROmorphone (PF) (DILAUDID) injection 1 mg  1 mg IntraVENous Q1H PRN    aspirin chewable tablet 81 mg  81 mg Oral DAILY    octreotide (SANDOSTATIN) injection 50 mcg  50 mcg IntraVENous TID    insulin regular (NOVOLIN R, HUMULIN R) injection   SubCUTAneous Q6H    morphine (PF)  mg/30 ml   IntraVENous CONTINUOUS    glucose chewable tablet 16 g  4 Tab Oral PRN    glucagon (GLUCAGEN) injection 1 mg  1 mg IntraMUSCular PRN    dextrose 10 % infusion 125-250 mL  125-250 mL IntraVENous PRN    pantoprazole (PROTONIX) 40 mg in sodium chloride 0.9 % 10 mL injection  40 mg IntraVENous DAILY    albuterol-ipratropium (DUO-NEB) 2.5 MG-0.5 MG/3 ML  3 mL Nebulization Q6H PRN    prochlorperazine (COMPAZINE) with saline injection 5 mg  5 mg IntraVENous Q6H PRN    anidulafungin (ERAXIS) 100 mg in 0.9% sodium chloride 130 mL IVPB  100 mg IntraVENous Q24H    ondansetron (ZOFRAN) injection 8 mg  8 mg IntraVENous Q6H PRN    sodium chloride (NS) flush 5-10 mL  5-10 mL IntraVENous Q8H    sodium chloride (NS) flush 5-10 mL  5-10 mL IntraVENous PRN    naloxone (NARCAN) injection 0.4 mg  0.4 mg IntraVENous PRN    diphenhydrAMINE (BENADRYL) injection 12.5 mg  12.5 mg IntraVENous Q4H PRN    enoxaparin (LOVENOX) injection 40 mg  40 mg SubCUTAneous Q24H    piperacillin-tazobactam (ZOSYN) 3.375 g in 0.9% sodium chloride (MBP/ADV) 100 mL  3.375 g IntraVENous Q8H    Vancomycin ---Pharmacy to Dose   Other Rx Dosing/Monitoring       Nery Acosta NP  7/15/2017

## 2017-07-15 NOTE — PROGRESS NOTES
Problem: Falls - Risk of  Goal: *Absence of falls  Outcome: Progressing Towards Goal  Patient has multiple drains and wound vac; IV and PCA pump; needs assistance getting up with walker; and assistance with lines and drains    Problem: Pressure Injury - Risk of  Goal: *Prevention of pressure ulcer  Outcome: Progressing Towards Goal  Specialty bed and on mobility team, followed by wound care;   Patient turn Q 2

## 2017-07-15 NOTE — PROGRESS NOTES
Bedside shift change report given to Moe Murillo RN (oncoming nurse) by April RN (offgoing nurse). Report included the following information SBAR, Intake/Output, MAR, Accordion and Cardiac Rhythm NSR/ST.

## 2017-07-15 NOTE — PROGRESS NOTES
Bedside and Verbal shift change report given to [facility]' (oncoming nurse) by [place]es (offgoing nurse). Report included the following information SBAR, OR Summary, Procedure Summary, Intake/Output, MAR, Accordion, Recent Results and Med Rec Status.

## 2017-07-15 NOTE — PROGRESS NOTES
Pharmacist Note - Vancomycin Dosing  Therapy day 21  Indication: Surgical site infection / ischemic bowel S/P SMA stenting   Current regimen: 1250 mg IV Q24h    A Trough Level resulted at 12.1 mcg/mL which was obtained ~25.5 hrs post-dose. The extrapolated \"true\" trough is slightly higher but still within the goal range of 10-15mcg/mL. Plan: Continue current regimen. Pharmacy will continue to monitor this patient daily for changes in clinical status and renal function.

## 2017-07-16 LAB
ANION GAP BLD CALC-SCNC: 6 MMOL/L (ref 5–15)
BASOPHILS # BLD AUTO: 0 K/UL (ref 0–0.1)
BASOPHILS # BLD: 0 % (ref 0–1)
BUN SERPL-MCNC: 26 MG/DL (ref 6–20)
BUN/CREAT SERPL: 24 (ref 12–20)
CALCIUM SERPL-MCNC: 8.7 MG/DL (ref 8.5–10.1)
CHLORIDE SERPL-SCNC: 110 MMOL/L (ref 97–108)
CO2 SERPL-SCNC: 26 MMOL/L (ref 21–32)
CREAT SERPL-MCNC: 1.07 MG/DL (ref 0.55–1.02)
EOSINOPHIL # BLD: 0.7 K/UL (ref 0–0.4)
EOSINOPHIL NFR BLD: 6 % (ref 0–7)
ERYTHROCYTE [DISTWIDTH] IN BLOOD BY AUTOMATED COUNT: 15.7 % (ref 11.5–14.5)
GLUCOSE BLD STRIP.AUTO-MCNC: 106 MG/DL (ref 65–100)
GLUCOSE BLD STRIP.AUTO-MCNC: 130 MG/DL (ref 65–100)
GLUCOSE BLD STRIP.AUTO-MCNC: 148 MG/DL (ref 65–100)
GLUCOSE BLD STRIP.AUTO-MCNC: 150 MG/DL (ref 65–100)
GLUCOSE SERPL-MCNC: 125 MG/DL (ref 65–100)
HCT VFR BLD AUTO: 26.2 % (ref 35–47)
HGB BLD-MCNC: 8.3 G/DL (ref 11.5–16)
LYMPHOCYTES # BLD AUTO: 33 % (ref 12–49)
LYMPHOCYTES # BLD: 4.2 K/UL (ref 0.8–3.5)
MAGNESIUM SERPL-MCNC: 1.9 MG/DL (ref 1.6–2.4)
MCH RBC QN AUTO: 28.6 PG (ref 26–34)
MCHC RBC AUTO-ENTMCNC: 31.7 G/DL (ref 30–36.5)
MCV RBC AUTO: 90.3 FL (ref 80–99)
MONOCYTES # BLD: 1.2 K/UL (ref 0–1)
MONOCYTES NFR BLD AUTO: 10 % (ref 5–13)
NEUTS SEG # BLD: 6.4 K/UL (ref 1.8–8)
NEUTS SEG NFR BLD AUTO: 51 % (ref 32–75)
PHOSPHATE SERPL-MCNC: 4.1 MG/DL (ref 2.6–4.7)
PLATELET # BLD AUTO: 416 K/UL (ref 150–400)
POTASSIUM SERPL-SCNC: 4 MMOL/L (ref 3.5–5.1)
RBC # BLD AUTO: 2.9 M/UL (ref 3.8–5.2)
SERVICE CMNT-IMP: ABNORMAL
SODIUM SERPL-SCNC: 142 MMOL/L (ref 136–145)
WBC # BLD AUTO: 12.6 K/UL (ref 3.6–11)

## 2017-07-16 PROCEDURE — 74011250637 HC RX REV CODE- 250/637: Performed by: SURGERY

## 2017-07-16 PROCEDURE — 77030019952 HC CANSTR VAC ASST KCON -B

## 2017-07-16 PROCEDURE — 83735 ASSAY OF MAGNESIUM: CPT | Performed by: SURGERY

## 2017-07-16 PROCEDURE — 74011000258 HC RX REV CODE- 258: Performed by: SURGERY

## 2017-07-16 PROCEDURE — 74011000250 HC RX REV CODE- 250: Performed by: SURGERY

## 2017-07-16 PROCEDURE — 74011250636 HC RX REV CODE- 250/636: Performed by: NURSE PRACTITIONER

## 2017-07-16 PROCEDURE — 74011250636 HC RX REV CODE- 250/636: Performed by: INTERNAL MEDICINE

## 2017-07-16 PROCEDURE — 74011000250 HC RX REV CODE- 250: Performed by: INTERNAL MEDICINE

## 2017-07-16 PROCEDURE — 74011636637 HC RX REV CODE- 636/637: Performed by: SURGERY

## 2017-07-16 PROCEDURE — 65660000000 HC RM CCU STEPDOWN

## 2017-07-16 PROCEDURE — 74011250636 HC RX REV CODE- 250/636: Performed by: SURGERY

## 2017-07-16 PROCEDURE — 74011000258 HC RX REV CODE- 258: Performed by: INTERNAL MEDICINE

## 2017-07-16 PROCEDURE — 74011250636 HC RX REV CODE- 250/636: Performed by: PHYSICAL MEDICINE & REHABILITATION

## 2017-07-16 PROCEDURE — 85025 COMPLETE CBC W/AUTO DIFF WBC: CPT | Performed by: SURGERY

## 2017-07-16 PROCEDURE — 36415 COLL VENOUS BLD VENIPUNCTURE: CPT | Performed by: SURGERY

## 2017-07-16 PROCEDURE — 3331090002 HH PPS REVENUE DEBIT

## 2017-07-16 PROCEDURE — 80048 BASIC METABOLIC PNL TOTAL CA: CPT | Performed by: SURGERY

## 2017-07-16 PROCEDURE — 82962 GLUCOSE BLOOD TEST: CPT

## 2017-07-16 PROCEDURE — 84100 ASSAY OF PHOSPHORUS: CPT | Performed by: SURGERY

## 2017-07-16 PROCEDURE — C9113 INJ PANTOPRAZOLE SODIUM, VIA: HCPCS | Performed by: SURGERY

## 2017-07-16 PROCEDURE — 3331090001 HH PPS REVENUE CREDIT

## 2017-07-16 RX ADMIN — Medication 1 MG: at 13:57

## 2017-07-16 RX ADMIN — PIPERACILLIN SODIUM,TAZOBACTAM SODIUM 3.38 G: 3; .375 INJECTION, POWDER, FOR SOLUTION INTRAVENOUS at 06:16

## 2017-07-16 RX ADMIN — Medication 1 MG: at 22:26

## 2017-07-16 RX ADMIN — ENOXAPARIN SODIUM 40 MG: 40 INJECTION SUBCUTANEOUS at 22:05

## 2017-07-16 RX ADMIN — ONDANSETRON 8 MG: 2 INJECTION INTRAMUSCULAR; INTRAVENOUS at 10:09

## 2017-07-16 RX ADMIN — HUMAN INSULIN 2 UNITS: 100 INJECTION, SOLUTION SUBCUTANEOUS at 06:16

## 2017-07-16 RX ADMIN — SODIUM CHLORIDE 5 MG: 9 INJECTION INTRAMUSCULAR; INTRAVENOUS; SUBCUTANEOUS at 03:16

## 2017-07-16 RX ADMIN — PIPERACILLIN SODIUM,TAZOBACTAM SODIUM 3.38 G: 3; .375 INJECTION, POWDER, FOR SOLUTION INTRAVENOUS at 13:56

## 2017-07-16 RX ADMIN — CALCIUM GLUCONATE: 94 INJECTION, SOLUTION INTRAVENOUS at 19:01

## 2017-07-16 RX ADMIN — SODIUM CHLORIDE 40 MG: 9 INJECTION INTRAMUSCULAR; INTRAVENOUS; SUBCUTANEOUS at 09:53

## 2017-07-16 RX ADMIN — ASPIRIN 81 MG CHEWABLE TABLET 81 MG: 81 TABLET CHEWABLE at 09:52

## 2017-07-16 RX ADMIN — Medication 10 ML: at 06:17

## 2017-07-16 RX ADMIN — Medication 10 ML: at 14:00

## 2017-07-16 RX ADMIN — CLOPIDOGREL BISULFATE 75 MG: 75 TABLET ORAL at 09:52

## 2017-07-16 RX ADMIN — SODIUM CHLORIDE 100 MG: 900 INJECTION, SOLUTION INTRAVENOUS at 19:04

## 2017-07-16 RX ADMIN — HYDROMORPHONE HYDROCHLORIDE 1 MG: 1 INJECTION, SOLUTION INTRAMUSCULAR; INTRAVENOUS; SUBCUTANEOUS at 18:53

## 2017-07-16 RX ADMIN — Medication 10 ML: at 17:00

## 2017-07-16 RX ADMIN — PIPERACILLIN SODIUM,TAZOBACTAM SODIUM 3.38 G: 3; .375 INJECTION, POWDER, FOR SOLUTION INTRAVENOUS at 22:06

## 2017-07-16 RX ADMIN — Medication: at 18:57

## 2017-07-16 RX ADMIN — Medication 10 ML: at 22:09

## 2017-07-16 RX ADMIN — OCTREOTIDE ACETATE 75 MCG: 100 INJECTION, SOLUTION INTRAVENOUS; SUBCUTANEOUS at 09:57

## 2017-07-16 RX ADMIN — VANCOMYCIN HYDROCHLORIDE 1250 MG: 10 INJECTION, POWDER, LYOPHILIZED, FOR SOLUTION INTRAVENOUS at 13:11

## 2017-07-16 RX ADMIN — Medication 1 MG: at 06:16

## 2017-07-16 RX ADMIN — Medication: at 11:04

## 2017-07-16 NOTE — PROGRESS NOTES
Bedside and Verbal shift change report given to haja (oncoming nurse) by Ofelia Figueroa (offgoing nurse). Report included the following information SBAR, Kardex, ED Summary, OR Summary, Procedure Summary, Intake/Output, MAR, Recent Results and Cardiac Rhythm nsr, sinus tach.

## 2017-07-17 LAB
ANION GAP BLD CALC-SCNC: 9 MMOL/L (ref 5–15)
BASOPHILS # BLD AUTO: 0 K/UL (ref 0–0.1)
BASOPHILS # BLD: 0 % (ref 0–1)
BUN SERPL-MCNC: 27 MG/DL (ref 6–20)
BUN/CREAT SERPL: 23 (ref 12–20)
CALCIUM SERPL-MCNC: 8.6 MG/DL (ref 8.5–10.1)
CHLORIDE SERPL-SCNC: 108 MMOL/L (ref 97–108)
CO2 SERPL-SCNC: 23 MMOL/L (ref 21–32)
CREAT SERPL-MCNC: 1.15 MG/DL (ref 0.55–1.02)
EOSINOPHIL # BLD: 0.7 K/UL (ref 0–0.4)
EOSINOPHIL NFR BLD: 5 % (ref 0–7)
ERYTHROCYTE [DISTWIDTH] IN BLOOD BY AUTOMATED COUNT: 15.8 % (ref 11.5–14.5)
GLUCOSE BLD STRIP.AUTO-MCNC: 107 MG/DL (ref 65–100)
GLUCOSE BLD STRIP.AUTO-MCNC: 116 MG/DL (ref 65–100)
GLUCOSE BLD STRIP.AUTO-MCNC: 173 MG/DL (ref 65–100)
GLUCOSE BLD STRIP.AUTO-MCNC: 180 MG/DL (ref 65–100)
GLUCOSE SERPL-MCNC: 119 MG/DL (ref 65–100)
HCT VFR BLD AUTO: 27.1 % (ref 35–47)
HGB BLD-MCNC: 8.5 G/DL (ref 11.5–16)
LYMPHOCYTES # BLD AUTO: 35 % (ref 12–49)
LYMPHOCYTES # BLD: 4.8 K/UL (ref 0.8–3.5)
MAGNESIUM SERPL-MCNC: 1.9 MG/DL (ref 1.6–2.4)
MCH RBC QN AUTO: 28.7 PG (ref 26–34)
MCHC RBC AUTO-ENTMCNC: 31.4 G/DL (ref 30–36.5)
MCV RBC AUTO: 91.6 FL (ref 80–99)
MONOCYTES # BLD: 1.5 K/UL (ref 0–1)
MONOCYTES NFR BLD AUTO: 11 % (ref 5–13)
NEUTS SEG # BLD: 6.7 K/UL (ref 1.8–8)
NEUTS SEG NFR BLD AUTO: 49 % (ref 32–75)
PHOSPHATE SERPL-MCNC: 4.1 MG/DL (ref 2.6–4.7)
PLATELET # BLD AUTO: 432 K/UL (ref 150–400)
POTASSIUM SERPL-SCNC: 4 MMOL/L (ref 3.5–5.1)
RBC # BLD AUTO: 2.96 M/UL (ref 3.8–5.2)
SERVICE CMNT-IMP: ABNORMAL
SODIUM SERPL-SCNC: 140 MMOL/L (ref 136–145)
WBC # BLD AUTO: 13.8 K/UL (ref 3.6–11)

## 2017-07-17 PROCEDURE — 74011250636 HC RX REV CODE- 250/636: Performed by: INTERNAL MEDICINE

## 2017-07-17 PROCEDURE — 3331090001 HH PPS REVENUE CREDIT

## 2017-07-17 PROCEDURE — 74011250636 HC RX REV CODE- 250/636: Performed by: PHYSICAL MEDICINE & REHABILITATION

## 2017-07-17 PROCEDURE — 74011250636 HC RX REV CODE- 250/636: Performed by: NURSE PRACTITIONER

## 2017-07-17 PROCEDURE — 74011000250 HC RX REV CODE- 250: Performed by: NURSE PRACTITIONER

## 2017-07-17 PROCEDURE — 74011000258 HC RX REV CODE- 258: Performed by: INTERNAL MEDICINE

## 2017-07-17 PROCEDURE — 74011000250 HC RX REV CODE- 250: Performed by: SURGERY

## 2017-07-17 PROCEDURE — 84100 ASSAY OF PHOSPHORUS: CPT | Performed by: SURGERY

## 2017-07-17 PROCEDURE — 74011000250 HC RX REV CODE- 250: Performed by: ANESTHESIOLOGY

## 2017-07-17 PROCEDURE — 97116 GAIT TRAINING THERAPY: CPT

## 2017-07-17 PROCEDURE — 74011636637 HC RX REV CODE- 636/637: Performed by: NURSE PRACTITIONER

## 2017-07-17 PROCEDURE — 74011250637 HC RX REV CODE- 250/637: Performed by: SURGERY

## 2017-07-17 PROCEDURE — C9113 INJ PANTOPRAZOLE SODIUM, VIA: HCPCS | Performed by: SURGERY

## 2017-07-17 PROCEDURE — 74011250636 HC RX REV CODE- 250/636: Performed by: SURGERY

## 2017-07-17 PROCEDURE — 74011000258 HC RX REV CODE- 258: Performed by: NURSE PRACTITIONER

## 2017-07-17 PROCEDURE — 36415 COLL VENOUS BLD VENIPUNCTURE: CPT | Performed by: SURGERY

## 2017-07-17 PROCEDURE — 74011250636 HC RX REV CODE- 250/636: Performed by: ANESTHESIOLOGY

## 2017-07-17 PROCEDURE — 74011000250 HC RX REV CODE- 250: Performed by: INTERNAL MEDICINE

## 2017-07-17 PROCEDURE — 83735 ASSAY OF MAGNESIUM: CPT | Performed by: SURGERY

## 2017-07-17 PROCEDURE — 82962 GLUCOSE BLOOD TEST: CPT

## 2017-07-17 PROCEDURE — 74011636637 HC RX REV CODE- 636/637: Performed by: SURGERY

## 2017-07-17 PROCEDURE — 85025 COMPLETE CBC W/AUTO DIFF WBC: CPT | Performed by: SURGERY

## 2017-07-17 PROCEDURE — 80048 BASIC METABOLIC PNL TOTAL CA: CPT | Performed by: SURGERY

## 2017-07-17 PROCEDURE — 65660000000 HC RM CCU STEPDOWN

## 2017-07-17 PROCEDURE — 3331090002 HH PPS REVENUE DEBIT

## 2017-07-17 PROCEDURE — 74011000258 HC RX REV CODE- 258: Performed by: SURGERY

## 2017-07-17 RX ORDER — OCTREOTIDE ACETATE 50 UG/ML
50 INJECTION, SOLUTION INTRAVENOUS; SUBCUTANEOUS 3 TIMES DAILY
Status: DISCONTINUED | OUTPATIENT
Start: 2017-07-17 | End: 2017-08-03

## 2017-07-17 RX ADMIN — ENOXAPARIN SODIUM 40 MG: 40 INJECTION SUBCUTANEOUS at 23:54

## 2017-07-17 RX ADMIN — HUMAN INSULIN 2 UNITS: 100 INJECTION, SOLUTION SUBCUTANEOUS at 05:40

## 2017-07-17 RX ADMIN — PIPERACILLIN SODIUM,TAZOBACTAM SODIUM 3.38 G: 3; .375 INJECTION, POWDER, FOR SOLUTION INTRAVENOUS at 13:47

## 2017-07-17 RX ADMIN — HYDROMORPHONE HYDROCHLORIDE 1 MG: 1 INJECTION, SOLUTION INTRAMUSCULAR; INTRAVENOUS; SUBCUTANEOUS at 12:08

## 2017-07-17 RX ADMIN — Medication: at 13:57

## 2017-07-17 RX ADMIN — OCTREOTIDE ACETATE 50 MCG: 50 INJECTION, SOLUTION INTRAVENOUS; SUBCUTANEOUS at 15:59

## 2017-07-17 RX ADMIN — PIPERACILLIN SODIUM,TAZOBACTAM SODIUM 3.38 G: 3; .375 INJECTION, POWDER, FOR SOLUTION INTRAVENOUS at 05:26

## 2017-07-17 RX ADMIN — PIPERACILLIN SODIUM,TAZOBACTAM SODIUM 3.38 G: 3; .375 INJECTION, POWDER, FOR SOLUTION INTRAVENOUS at 21:26

## 2017-07-17 RX ADMIN — SODIUM CHLORIDE 40 MG: 9 INJECTION INTRAMUSCULAR; INTRAVENOUS; SUBCUTANEOUS at 09:30

## 2017-07-17 RX ADMIN — SODIUM CHLORIDE 100 MG: 900 INJECTION, SOLUTION INTRAVENOUS at 18:08

## 2017-07-17 RX ADMIN — Medication 10 ML: at 05:27

## 2017-07-17 RX ADMIN — SODIUM CHLORIDE 5 MG: 9 INJECTION INTRAMUSCULAR; INTRAVENOUS; SUBCUTANEOUS at 09:40

## 2017-07-17 RX ADMIN — Medication: at 04:29

## 2017-07-17 RX ADMIN — Medication 1 MG: at 21:26

## 2017-07-17 RX ADMIN — VANCOMYCIN HYDROCHLORIDE 1250 MG: 10 INJECTION, POWDER, LYOPHILIZED, FOR SOLUTION INTRAVENOUS at 12:13

## 2017-07-17 RX ADMIN — ASPIRIN 81 MG CHEWABLE TABLET 81 MG: 81 TABLET CHEWABLE at 09:30

## 2017-07-17 RX ADMIN — CALCIUM GLUCONATE: 94 INJECTION, SOLUTION INTRAVENOUS at 18:29

## 2017-07-17 RX ADMIN — OCTREOTIDE ACETATE 75 MCG: 100 INJECTION, SOLUTION INTRAVENOUS; SUBCUTANEOUS at 09:00

## 2017-07-17 RX ADMIN — Medication 1 MG: at 05:27

## 2017-07-17 RX ADMIN — I.V. FAT EMULSION 500 ML: 20 EMULSION INTRAVENOUS at 18:28

## 2017-07-17 RX ADMIN — Medication 1 MG: at 13:47

## 2017-07-17 RX ADMIN — CLOPIDOGREL BISULFATE 75 MG: 75 TABLET ORAL at 09:30

## 2017-07-17 RX ADMIN — SODIUM CHLORIDE, SODIUM LACTATE, POTASSIUM CHLORIDE, AND CALCIUM CHLORIDE 50 ML/HR: 600; 310; 30; 20 INJECTION, SOLUTION INTRAVENOUS at 15:59

## 2017-07-17 RX ADMIN — Medication: at 23:43

## 2017-07-17 RX ADMIN — HYDROMORPHONE HYDROCHLORIDE 1 MG: 1 INJECTION, SOLUTION INTRAMUSCULAR; INTRAVENOUS; SUBCUTANEOUS at 02:41

## 2017-07-17 RX ADMIN — ALTEPLASE 1 MG: 2.2 INJECTION, POWDER, LYOPHILIZED, FOR SOLUTION INTRAVENOUS at 19:39

## 2017-07-17 RX ADMIN — DIPHENHYDRAMINE HYDROCHLORIDE 12.5 MG: 50 INJECTION, SOLUTION INTRAMUSCULAR; INTRAVENOUS at 09:40

## 2017-07-17 NOTE — PROGRESS NOTES
ID Progress Note  2017    Subjective:     Seems to be having an ok day    Objective:     Antibiotics:  1. Vancomycin  2. Zosyn   3. Eraxis      Vitals:   Visit Vitals    /52 (BP 1 Location: Left arm, BP Patient Position: At rest)    Pulse 98    Temp 98.4 °F (36.9 °C)    Resp 18    Ht 5' 4\" (1.626 m)    Wt 149.7 kg (330 lb 0.5 oz)    LMP 2017 (Approximate)    SpO2 99%    Breastfeeding No    BMI 56.65 kg/m2        Tmax:  Temp (24hrs), Av.4 °F (36.9 °C), Min:98.1 °F (36.7 °C), Max:99.1 °F (37.3 °C)      Exam:  Lungs:  clear to auscultation bilaterally  Heart:  regularly irregular rhythm  Abdomen:  abnormal findings:  tenderness moderate in the entire abdomen, hypoactive bowel sounds    Labs:      Recent Labs      17   0312  17   0339  07/15/17   0350   WBC  13.8*  12.6*  14.5*   HGB  8.5*  8.3*  8.5*   PLT  432*  416*  463*   BUN  27*  26*  25*   CREA  1.15*  1.07*  1.18*       Cultures:     Lab Results   Component Value Date/Time    Specimen Description: URINE 2009 07:45 PM     Lab Results   Component Value Date/Time    Culture result: NO GROWTH ON SOLID MEDIA AT 4 DAYS 2017 12:17 PM    Culture result:  2017 12:17 PM     **METHICILLIN RESISTANT STAPHYLOCOCCUS AUREUS**  ISOLATED FROM THIO BROTH ONLY      Culture result: NO GROWTH 5 DAYS 2017 02:35 PM       Radiology:     Line/Insert Date:           Assessment:     1. Ischemic gut with frozen abdomen  2. Leukocytosis--back up after surgery--trending back down overall  3. Cultures negative to date    Objective:     1. Continue IV therapy  2. Follow up cultures and studies  3.  Work up fevers    Raven Kelsey MD

## 2017-07-17 NOTE — PROGRESS NOTES
Palliative Medicine Consult  [place]: [phone] ([phone])    Patient Name: Linda Otoole  YOB: 1975    Date of Initial Consult: 6/27/17  Reason for Consult: overwhelming symptoms  Requesting Provider: Greta Medina NP  Primary Care Physician: Rama Vang MD      SUMMARY:   Linda Otoole is a 46 y.o. with a past history of Crohn's disease, anxiety, chronic pain, hx DVT,multiple (4) small bowel resections, s/p recent urgent dx lap, with lysis of adhesions, repair of suspected perf 6/7/17 , who was admitted on 6/25/2017 from home with a diagnosis of worsening abdominal pain, elevated WBC and MRSA wound infection. Initially consulted for pain management thought to be possible Crohn's flare. Initial CT on 6/25 w/out acute findings but repeat on 6/28 showing ischemic bowl. S/p ex lap but ischemic bowl unresectable, s/p SMA stent. Extubated on 6/30. Most recently s/p ex lap, KATHRINE, feeding tube, fistula exclusion and wound vac placement for EC fistula. Patient is followed chronically for pain management by Dr. Angeles Marcos at [address] [place] [address]. Home regimen for chronic abdominal pain is MS Contin 60 Q8 and MS IR 30 Q6 PRN. She also takes Xanax 1mg TID prn anxiety.  reviewed, opioids last prescribed on 5/19/17. PALLIATIVE DIAGNOSES:     1. Abdominal pain, acute on chronic   2. Anxiety, acute on chronic   3. Nausea  4. Crohn's disease  5. Chronic constipation (at home on [employer])  6. MRSA wound        PLAN:   1. Patient appears comfortable, reports pain at 6 or 7 / 10, not too far from her usual 5/10; pt used ~2 pca doses / hr for last few hours; she denies being too sedated or confused with meds; had 2 doses IV dilaudid today thus far   2. Continue Morphine PCA 4mg/h basal, 5mg every 10 min demand. Seems to control the pain to some degree and w/out significant confusion or drowsiness (this has been a problem before).    3. Cont prn Dilaudid 1mg IV every 1h prn pain that is not controlled by PCA- hortencia before any dressing changes  4. Pt feels that Morphine works better than Dilaudid (at equivalent doses). 5. PRN antiemetics  6. Bowel regimen per Surgery team - has chronic constipation, now on PCA. 7. Psychosocial support- pt suffering from loss of function and identity. Feels people are judging her for her pain medication requirement. Discussed with team to address concerns about opioids, palliative care  Bhumika visits pt.  8. On Ativan 1 mg TID for anxiety  9. Communicated plan of care with: Palliative IDT; have been in communication w/ Dr Beau Lion. GOALS OF CARE / TREATMENT PREFERENCES:   [====Goals of Care====]  GOALS OF CARE:  Patient / health care proxy stated goals: pain control  Recovery from acute issues      TREATMENT PREFERENCES:   Code Status: Full Code    Advance Care Planning:  Advance Care Planning 6/26/2017   Patient's Healthcare Decision Maker is: Legal Next of Camilo Jiménez   Primary Decision Maker Name Sid Biggs   Primary Decision Maker Phone Number [phone]   Primary Decision Maker Relationship to Patient Parent   Confirm Advance Directive None   Patient Would Like to Complete Advance Directive No       Other:    The palliative care team has discussed with patient / health care proxy about goals of care / treatment preferences for patient.  [====Goals of Care====]         HISTORY:     Reviewed vitals, I/O's, labs, imaging, MAR, notes.   Tmax 99, hr ~100, on room air 90's, \"alert\"  No PO documented 7/16 on I/o, on abx / TPN, no BM, 1657 from drains  Cr 1.15  LFT on 7/8: ast / alt / t shirin ok, ap 120, albumin 1.5    MAR  Dilaudid 1mg IV q1h PRN, 2 doses thus far 7/17 last at 1200  Ativan 1mg IV q8h scheduled  Morphine pca, 4mg/hr basal, 5mg pca dose q10min, 4 hour lockout of 120mg  Octreotide starting 7/17  zofran IV PRN, dose 7/16 x 1  Compazine IV 5mg PRN, dose 7/16 x x, dose 7/17 x 1 0900      HPI/SUBJECTIVE:    The patient is:   [x] Verbal and participatory  [] Non-participatory due to: Medical condition     Pt reports abd pain 6 or 7 / 10, baseline is 5/10. No nausea at the moment, had some earlier, got zofran, improved. Denies sob. Working with PT, that's going ok per pt report. Clinical Pain Assessment (nonverbal scale for severity on nonverbal patients):   [++++ Clinical Pain Assessment++++]  [++++Pain Severity++++]: Pain: 6  [++++Pain Character++++]: sharp and burning, stabbing  [++++Pain Duration++++]: several days  [++++Pain Effect++++]:  feeling anxious  [++++Pain Factors++++]: better after pain medicaion  [++++Pain Frequency++++]: constant  [++++Pain Location++++]: across abdomen both sides in middle at incision site  [++++ Clinical Pain Assessment++++]     FUNCTIONAL ASSESSMENT:     Palliative Performance Scale (PPS):  PPS: 50       PSYCHOSOCIAL/SPIRITUAL SCREENING:     Advance Care Planning:  Advance Care Planning 6/26/2017   Patient's Healthcare Decision Maker is: Legal Next of Camilo 71   Primary Decision Maker Name Tresa Fragoso   Primary Decision Maker Phone Number [phone]   Primary Decision Maker Relationship to Patient Parent   Confirm Advance Directive None   Patient Would Like to Complete Advance Directive No        Any spiritual / Samaritan concerns:  [] Yes /  [x] No    Caregiver Burnout:  [] Yes /  [x] No /  [] No Caregiver Present      Anticipatory grief assessment:   [x] Normal  / [] Maladaptive       ESAS Anxiety: Anxiety: 3    ESAS Depression: Depression: 2        REVIEW OF SYSTEMS:     Positive and pertinent negative findings in ROS are noted above in HPI. The following systems were [x] reviewed / [] unable to be reviewed as noted in HPI  Other findings are noted below. Systems: constitutional, ears/nose/mouth/throat, respiratory, gastrointestinal, genitourinary, musculoskeletal, integumentary, neurologic, psychiatric, endocrine. Positive findings noted below.   Modified ESAS Completed by: provider   Fatigue: 4 Drowsiness: 0 Depression: 2 Pain: 6   Anxiety: 3 Nausea: 1   Anorexia: 0 Dyspnea: 0     Constipation: No              PHYSICAL EXAM:     From RN flowsheet:  Wt Readings from Last 3 Encounters:   07/17/17 330 lb 0.5 oz (149.7 kg)   06/25/17 343 lb (155.6 kg)   06/22/17 343 lb (155.6 kg)     Blood pressure 124/79, pulse (!) 101, temperature 98.2 °F (36.8 °C), resp. rate 18, height 5' 4\" (1.626 m), weight 330 lb 0.5 oz (149.7 kg), last menstrual period 05/21/2017, SpO2 95 %, not currently breastfeeding. Pain Scale 1: Numeric (0 - 10)  Pain Intensity 1: 5  Pain Onset 1: post op  Pain Location 1: Abdomen  Pain Orientation 1: Anterior  Pain Description 1: Aching  Pain Intervention(s) 1: Encouraged PCA  Last bowel movement, if known: stool in ostomy     Constitutional:awake, alert, oriented, tearful and axious   Eyes: pupils equal, anicteric  ENMT: no nasal discharge, moist mucous membranes  Cardiac: regular rhythm   Respiratory: breathing not labored  Gastrointestinal: midline incision w/ wound vac, b/l wounds, hypoactive bowel sounds   Musculoskeletal: no deformity, no tenderness to palpation  Skin: warm, dry, no edema  Neurologic: moving all extremities  Psych: anxious      HISTORY:     Active Problems:    Abdominal pain (6/25/2017)      Small bowel ischemia (HCC) (6/28/2017)      Overview: CT abd/pelvis 6/28/17      1. The stomach and proximal small bowel loops are distended. There is a       loop of      small bowel in the midabdomen which is mildly dilated and does not       demonstrate      enhancement in the wall and there is suspicion of pneumatosis and this       leads to      a loop of small bowel which demonstrates thickening of the wall and       findings are      suspicious for ischemia. 2. There is extensive mesenteric edema and a small amount of ascites in       the      pelvis. 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  There is nonocclusive thrombus in the SMA. 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)      Past Medical History:   Diagnosis Date    [facility]'s disease ([facility] 8/15/2011    DVT (deep venous thrombosis) ([facility] 8/15/2011    Incarcerated ventral hernia 8/15/2011    Right flank pain 8/22/2011    Seizures ([facility]     Stroke [facility])       Past Surgical History:   Procedure Laterality Date    HX OTHER SURGICAL      multiple procedures related to her Crohn's disease    MA LAP, INCISIONAL HERNIA REPAIR,INCARCERATED  9-10-08    dr. Orly Benites      Family History   Problem Relation Age of Onset    Hypertension Father     Stroke Father     Other Father      arthritis      History reviewed, no pertinent family history.   Social History   Substance Use Topics    Smoking status: Former Smoker    Smokeless tobacco: Not on file    Alcohol use No     Allergies   Allergen Reactions    Demerol [Meperidine] Unknown (comments)    Soma [Carisoprodol] Rash and Nausea Only    Sulfa (Sulfonamide Antibiotics) Unknown (comments)    Toradol [Ketorolac Tromethamine] Unknown (comments)      Current Facility-Administered Medications   Medication Dose Route Frequency    octreotide (SANDOSTATIN) injection 50 mcg  50 mcg IntraVENous TID    TPN ADULT - CENTRAL   IntraVENous TITRATE    vancomycin (VANCOCIN) 1250 mg in  ml infusion  1,250 mg IntraVENous Q24H    clopidogrel (PLAVIX) tablet 75 mg  75 mg Oral DAILY    sodium chloride (NS) flush 10 mL  10 mL InterCATHeter Q24H    sodium chloride (NS) flush 10 mL  10 mL InterCATHeter PRN    sodium chloride (NS) flush 10-40 mL  10-40 mL InterCATHeter Q8H    alteplase (CATHFLO) 1 mg in sterile water (preservative free) 1 mL injection  1 mg InterCATHeter PRN    bacitracin 500 unit/gram packet 1 Packet  1 Packet Topical PRN    0.9% sodium chloride infusion 250 mL  250 mL IntraVENous PRN    lactated Ringers infusion  50 mL/hr IntraVENous CONTINUOUS    fat emulsion 20% (LIPOSYN, INTRALIPID) infusion 500 mL  500 mL IntraVENous Q MON, WED & FRI    LORazepam (ATIVAN) injection 1 mg  1 mg IntraVENous Q8H    HYDROmorphone (PF) (DILAUDID) injection 1 mg  1 mg IntraVENous Q1H PRN    aspirin chewable tablet 81 mg  81 mg Oral DAILY    insulin regular (NOVOLIN R, HUMULIN R) injection   SubCUTAneous Q6H    morphine (PF)  mg/30 ml   IntraVENous CONTINUOUS    glucose chewable tablet 16 g  4 Tab Oral PRN    glucagon (GLUCAGEN) injection 1 mg  1 mg IntraMUSCular PRN    dextrose 10 % infusion 125-250 mL  125-250 mL IntraVENous PRN    pantoprazole (PROTONIX) 40 mg in sodium chloride 0.9 % 10 mL injection  40 mg IntraVENous DAILY    albuterol-ipratropium (DUO-NEB) 2.5 MG-0.5 MG/3 ML  3 mL Nebulization Q6H PRN    prochlorperazine (COMPAZINE) with saline injection 5 mg  5 mg IntraVENous Q6H PRN    anidulafungin (ERAXIS) 100 mg in 0.9% sodium chloride 130 mL IVPB  100 mg IntraVENous Q24H    ondansetron (ZOFRAN) injection 8 mg  8 mg IntraVENous Q6H PRN    sodium chloride (NS) flush 5-10 mL  5-10 mL IntraVENous Q8H    sodium chloride (NS) flush 5-10 mL  5-10 mL IntraVENous PRN    naloxone (NARCAN) injection 0.4 mg  0.4 mg IntraVENous PRN    diphenhydrAMINE (BENADRYL) injection 12.5 mg  12.5 mg IntraVENous Q4H PRN    enoxaparin (LOVENOX) injection 40 mg  40 mg SubCUTAneous Q24H    piperacillin-tazobactam (ZOSYN) 3.375 g in 0.9% sodium chloride (MBP/ADV) 100 mL  3.375 g IntraVENous Q8H    Vancomycin ---Pharmacy to Dose   Other Rx Dosing/Monitoring          LAB AND IMAGING FINDINGS:     Lab Results   Component Value Date/Time    WBC 13.8 07/17/2017 03:12 AM    HGB 8.5 07/17/2017 03:12 AM    PLATELET 305 12/10/3128 03:12 AM     Lab Results   Component Value Date/Time    Sodium 140 07/17/2017 03:12 AM    Potassium 4.0 07/17/2017 03:12 AM    Chloride 108 07/17/2017 03:12 AM    CO2 23 07/17/2017 03:12 AM    BUN 27 07/17/2017 03:12 AM    Creatinine 1.15 07/17/2017 03:12 AM    Calcium 8.6 07/17/2017 03:12 AM    Magnesium 1.9 07/17/2017 03:12 AM    Phosphorus 4.1 07/17/2017 03:12 AM      Lab Results   Component Value Date/Time    AST (SGOT) 11 07/08/2017 04:42 AM    Alk. phosphatase 120 07/08/2017 04:42 AM    Protein, total 7.0 07/08/2017 04:42 AM    Albumin 1.5 07/08/2017 04:42 AM    Globulin 5.5 07/08/2017 04:42 AM     No results found for: INR, PTMR, PTP, PT1, PT2, APTT   No results found for: IRON, FE, TIBC, IBCT, PSAT, FERR   No results found for: PH, PCO2, PO2  No components found for: GLPOC   No results found for: CPK, CKMB             Total time:    Counseling / coordination time, spent as noted above:    > 50% counseling / coordination?:     Prolonged service was provided for  []30 min   []75 min in face to face time in the presence of the patient, spent as noted above. Time Start:   Time End:   Note: this can only be billed with 52928 (initial) or 06738 (follow up). If multiple start / stop times, list each separately.

## 2017-07-17 NOTE — PROGRESS NOTES
Bedside shift change report given to Elzbieta Lozada RN (oncoming nurse) by Nelsy Loja RN (offgoing nurse). Report included the following information SBAR, Kardex and Recent Results.

## 2017-07-17 NOTE — PROGRESS NOTES
Visited Ms. Kwong with Palliative Dr Heidy Fallon. Present as pt spoke with Dr Heidy Fallon about her symptoms and then remained present for a brief period of time following Dr. Valerie Masterson visit. Pt shared that she has had visits from her . She shared of the recent experience of having her hair cut and the  Significance of that experience. She became tearful at times - she shared that she doesn't like to cry in front of others. Offered normalization of emotions and explored things that bring her pat. She shared her hopes that she might soon be able to try liquids (something other than ice chips). Assurance of prayers offered. Pt shared that she is receptive to continued  visits. Will continue to follow for support as able/needed.     Bhumika Rangel, Palliative

## 2017-07-17 NOTE — PROGRESS NOTES
Bedside shift change report given to [address] (oncoming nurse) by Artis Arroyo (offgoing nurse). Report included the following information SBAR, Intake/Output, MAR, Recent Results and Cardiac Rhythm NSR.

## 2017-07-17 NOTE — PROGRESS NOTES
General Surgery Daily Progress Note    Admit Date: 2017  Post-Operative Day: 4 Days Post-Op from Procedure(s) with comments:  SPECIAL PROCEDURE OUTSIDE OF OR - central line placement     Subjective:     Last 24 hrs: Pt is sleepy - no c/o pain. Wants to know when she can have liquids. Asking when drains can be removed. Objective:     Blood pressure 124/79, pulse (!) 101, temperature 98.2 °F (36.8 °C), resp. rate 18, height 5' 4\" (1.626 m), weight 330 lb 0.5 oz (149.7 kg), last menstrual period 2017, SpO2 95 %, not currently breastfeeding. Temp (24hrs), Av.4 °F (36.9 °C), Min:98.1 °F (36.7 °C), Max:99.1 °F (37.3 °C)      _____________________  Physical Exam:     Alert and Oriented, x3, in no acute distress. Cardiovascular: RRR, no peripheral edema  Lungs:CTAB   Abdomen: soft, 2 malecot drains and fred drain intact - all w/ bilious contents. Wound vac intact      Assessment:   Active Problems:    Abdominal pain (2017)      Small bowel ischemia ([address] (2017)      Overview: CT abd/pelvis 17      1. The stomach and proximal small bowel loops are distended. There is a       loop of      small bowel in the midabdomen which is mildly dilated and does not       demonstrate      enhancement in the wall and there is suspicion of pneumatosis and this       leads to      a loop of small bowel which demonstrates thickening of the wall and       findings are      suspicious for ischemia. 2. There is extensive mesenteric edema and a small amount of ascites in       the      pelvis. 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 There is nonocclusive thrombus in the SMA.             Enterocutaneous fistula (2017)            Plan:     Renew TPN  Possible clear liquids  OOB w/ PT as doing  Am labs  Gi/dvt proph  abx    Data Review:    Recent Labs      17   0312  17   0339  07/15/17   0350   WBC  13.8*  12.6*  14.5* HGB  8.5*  8.3*  8.5*   HCT  27.1*  26.2*  27.1*   PLT  432*  416*  463*     Recent Labs      07/17/17   0312  07/16/17   0339  07/15/17   0350   NA  140  142  140   K  4.0  4.0  3.9   CL  108  110*  108   CO2  23  26  24   GLU  119*  125*  139*   BUN  27*  26*  25*   CREA  1.15*  1.07*  1.18*   CA  8.6  8.7  8.1*   MG  1.9  1.9  1.8   PHOS  4.1  4.1  3.6     No results for input(s): AML, LPSE in the last 72 hours.         ______________________  Medications:    Current Facility-Administered Medications   Medication Dose Route Frequency    octreotide (SANDOSTATIN) injection 50 mcg  50 mcg IntraVENous TID    vancomycin (VANCOCIN) 1250 mg in  ml infusion  1,250 mg IntraVENous Q24H    clopidogrel (PLAVIX) tablet 75 mg  75 mg Oral DAILY    sodium chloride (NS) flush 10 mL  10 mL InterCATHeter Q24H    sodium chloride (NS) flush 10 mL  10 mL InterCATHeter PRN    sodium chloride (NS) flush 10-40 mL  10-40 mL InterCATHeter Q8H    alteplase (CATHFLO) 1 mg in sterile water (preservative free) 1 mL injection  1 mg InterCATHeter PRN    bacitracin 500 unit/gram packet 1 Packet  1 Packet Topical PRN    0.9% sodium chloride infusion 250 mL  250 mL IntraVENous PRN    lactated Ringers infusion  50 mL/hr IntraVENous CONTINUOUS    fat emulsion 20% (LIPOSYN, INTRALIPID) infusion 500 mL  500 mL IntraVENous Q MON, WED & FRI    LORazepam (ATIVAN) injection 1 mg  1 mg IntraVENous Q8H    HYDROmorphone (PF) (DILAUDID) injection 1 mg  1 mg IntraVENous Q1H PRN    aspirin chewable tablet 81 mg  81 mg Oral DAILY    insulin regular (NOVOLIN R, HUMULIN R) injection   SubCUTAneous Q6H    morphine (PF)  mg/30 ml   IntraVENous CONTINUOUS    glucose chewable tablet 16 g  4 Tab Oral PRN    glucagon (GLUCAGEN) injection 1 mg  1 mg IntraMUSCular PRN    dextrose 10 % infusion 125-250 mL  125-250 mL IntraVENous PRN    pantoprazole (PROTONIX) 40 mg in sodium chloride 0.9 % 10 mL injection  40 mg IntraVENous DAILY    albuterol-ipratropium (DUO-NEB) 2.5 MG-0.5 MG/3 ML  3 mL Nebulization Q6H PRN    prochlorperazine (COMPAZINE) with saline injection 5 mg  5 mg IntraVENous Q6H PRN    anidulafungin (ERAXIS) 100 mg in 0.9% sodium chloride 130 mL IVPB  100 mg IntraVENous Q24H    ondansetron (ZOFRAN) injection 8 mg  8 mg IntraVENous Q6H PRN    sodium chloride (NS) flush 5-10 mL  5-10 mL IntraVENous Q8H    sodium chloride (NS) flush 5-10 mL  5-10 mL IntraVENous PRN    naloxone (NARCAN) injection 0.4 mg  0.4 mg IntraVENous PRN    diphenhydrAMINE (BENADRYL) injection 12.5 mg  12.5 mg IntraVENous Q4H PRN    enoxaparin (LOVENOX) injection 40 mg  40 mg SubCUTAneous Q24H    piperacillin-tazobactam (ZOSYN) 3.375 g in 0.9% sodium chloride (MBP/ADV) 100 mL  3.375 g IntraVENous Q8H    Vancomycin ---Pharmacy to Dose   Other Rx Dosing/Monitoring       Latoya Brooks NP  7/17/2017      I have independently examined the patient and have reviewed the chart. I agree with the above plan. Will add sips of clears today.       Bernie Danielle MD

## 2017-07-17 NOTE — PROGRESS NOTES
Problem: Falls - Risk of  Goal: *Absence of falls  Outcome: Progressing Towards Goal  Pt. hasnt had any falls since hospitalization. Gripper socks on, bed rails up x3, call bell within reach  Goal: *Knowledge of fall prevention  Outcome: Progressing Towards Goal  Pt. Calls out appropriately     Problem: Pressure Injury - Risk of  Goal: *Prevention of pressure ulcer  Outcome: Progressing Towards Goal  Pt. Is on turn team q 2hr    Problem: Surgical Pathway Post-Op Day 2 through Discharge  Goal: Activity/Safety  Outcome: Progressing Towards Goal  PT worked with pt. Pt walked to chair and sat up for 2 hr. Tolerated well  Goal: Nutrition/Diet  Outcome: Progressing Towards Goal  Pt. Is NPO with ice chips  Goal: Respiratory  Outcome: Progressing Towards Goal  Pt. Is on RA. Pt needs encouragement using IS  Goal: *Optimal pain control at patients stated goal  Outcome: Progressing Towards Goal  Pt. Is on Palliative care. Pt. Has a morphine PCA and IC 1 mg Dilaudid q 1 for breakthrough pain  Goal: *Adequate urinary output (equal to or greater than 30 milliliters/hour)  Outcome: Progressing Towards Goal  Pt.  Voiding adequate amount  Goal: *Hemodynamically stable  Outcome: Progressing Towards Goal  VS stable

## 2017-07-17 NOTE — PROGRESS NOTES
Problem: Surgical Pathway Post-Op Day 2 through Discharge  Goal: *Hemodynamically stable  Outcome: Progressing Towards Goal  Patient with stable vitals throughout shift.

## 2017-07-18 LAB
ANION GAP BLD CALC-SCNC: 8 MMOL/L (ref 5–15)
BASOPHILS # BLD AUTO: 0 K/UL (ref 0–0.1)
BASOPHILS # BLD: 0 % (ref 0–1)
BUN SERPL-MCNC: 25 MG/DL (ref 6–20)
BUN/CREAT SERPL: 24 (ref 12–20)
CALCIUM SERPL-MCNC: 8.6 MG/DL (ref 8.5–10.1)
CHLORIDE SERPL-SCNC: 109 MMOL/L (ref 97–108)
CO2 SERPL-SCNC: 23 MMOL/L (ref 21–32)
CREAT SERPL-MCNC: 1.05 MG/DL (ref 0.55–1.02)
EOSINOPHIL # BLD: 0.6 K/UL (ref 0–0.4)
EOSINOPHIL NFR BLD: 6 % (ref 0–7)
ERYTHROCYTE [DISTWIDTH] IN BLOOD BY AUTOMATED COUNT: 16 % (ref 11.5–14.5)
GLUCOSE BLD STRIP.AUTO-MCNC: 114 MG/DL (ref 65–100)
GLUCOSE BLD STRIP.AUTO-MCNC: 119 MG/DL (ref 65–100)
GLUCOSE BLD STRIP.AUTO-MCNC: 163 MG/DL (ref 65–100)
GLUCOSE BLD STRIP.AUTO-MCNC: 175 MG/DL (ref 65–100)
GLUCOSE SERPL-MCNC: 128 MG/DL (ref 65–100)
HCT VFR BLD AUTO: 26.5 % (ref 35–47)
HGB BLD-MCNC: 8.2 G/DL (ref 11.5–16)
LYMPHOCYTES # BLD AUTO: 37 % (ref 12–49)
LYMPHOCYTES # BLD: 3.8 K/UL (ref 0.8–3.5)
MAGNESIUM SERPL-MCNC: 1.9 MG/DL (ref 1.6–2.4)
MCH RBC QN AUTO: 28 PG (ref 26–34)
MCHC RBC AUTO-ENTMCNC: 30.9 G/DL (ref 30–36.5)
MCV RBC AUTO: 90.4 FL (ref 80–99)
MONOCYTES # BLD: 1.1 K/UL (ref 0–1)
MONOCYTES NFR BLD AUTO: 11 % (ref 5–13)
NEUTS SEG # BLD: 4.7 K/UL (ref 1.8–8)
NEUTS SEG NFR BLD AUTO: 46 % (ref 32–75)
PHOSPHATE SERPL-MCNC: 4.1 MG/DL (ref 2.6–4.7)
PLATELET # BLD AUTO: 377 K/UL (ref 150–400)
POTASSIUM SERPL-SCNC: 3.9 MMOL/L (ref 3.5–5.1)
RBC # BLD AUTO: 2.93 M/UL (ref 3.8–5.2)
SERVICE CMNT-IMP: ABNORMAL
SODIUM SERPL-SCNC: 140 MMOL/L (ref 136–145)
WBC # BLD AUTO: 10.2 K/UL (ref 3.6–11)

## 2017-07-18 PROCEDURE — 3331090002 HH PPS REVENUE DEBIT

## 2017-07-18 PROCEDURE — 74011000250 HC RX REV CODE- 250: Performed by: ANESTHESIOLOGY

## 2017-07-18 PROCEDURE — 74011250636 HC RX REV CODE- 250/636: Performed by: NURSE PRACTITIONER

## 2017-07-18 PROCEDURE — 82962 GLUCOSE BLOOD TEST: CPT

## 2017-07-18 PROCEDURE — 74011636637 HC RX REV CODE- 636/637: Performed by: PHYSICIAN ASSISTANT

## 2017-07-18 PROCEDURE — 85025 COMPLETE CBC W/AUTO DIFF WBC: CPT | Performed by: SURGERY

## 2017-07-18 PROCEDURE — 80048 BASIC METABOLIC PNL TOTAL CA: CPT | Performed by: SURGERY

## 2017-07-18 PROCEDURE — 74011000250 HC RX REV CODE- 250: Performed by: SURGERY

## 2017-07-18 PROCEDURE — 65660000000 HC RM CCU STEPDOWN

## 2017-07-18 PROCEDURE — 83735 ASSAY OF MAGNESIUM: CPT | Performed by: SURGERY

## 2017-07-18 PROCEDURE — 36415 COLL VENOUS BLD VENIPUNCTURE: CPT | Performed by: SURGERY

## 2017-07-18 PROCEDURE — 97116 GAIT TRAINING THERAPY: CPT

## 2017-07-18 PROCEDURE — 74011000258 HC RX REV CODE- 258: Performed by: INTERNAL MEDICINE

## 2017-07-18 PROCEDURE — 74011250636 HC RX REV CODE- 250/636: Performed by: ANESTHESIOLOGY

## 2017-07-18 PROCEDURE — 77030029684 HC NEB SM VOL KT MONA -A

## 2017-07-18 PROCEDURE — 74011000250 HC RX REV CODE- 250: Performed by: PHYSICIAN ASSISTANT

## 2017-07-18 PROCEDURE — 74011250636 HC RX REV CODE- 250/636: Performed by: INTERNAL MEDICINE

## 2017-07-18 PROCEDURE — 97606 NEG PRS WND THER DME>50 SQCM: CPT

## 2017-07-18 PROCEDURE — 74011000258 HC RX REV CODE- 258: Performed by: PHYSICIAN ASSISTANT

## 2017-07-18 PROCEDURE — 74011250636 HC RX REV CODE- 250/636: Performed by: SURGERY

## 2017-07-18 PROCEDURE — 74011250636 HC RX REV CODE- 250/636: Performed by: PHYSICIAN ASSISTANT

## 2017-07-18 PROCEDURE — 74011250636 HC RX REV CODE- 250/636: Performed by: PHYSICAL MEDICINE & REHABILITATION

## 2017-07-18 PROCEDURE — C9113 INJ PANTOPRAZOLE SODIUM, VIA: HCPCS | Performed by: SURGERY

## 2017-07-18 PROCEDURE — 77030018717 HC DRSG GRNUFM KCON -B

## 2017-07-18 PROCEDURE — 3331090001 HH PPS REVENUE CREDIT

## 2017-07-18 PROCEDURE — 84100 ASSAY OF PHOSPHORUS: CPT | Performed by: SURGERY

## 2017-07-18 PROCEDURE — 74011250637 HC RX REV CODE- 250/637: Performed by: SURGERY

## 2017-07-18 PROCEDURE — 74011000258 HC RX REV CODE- 258: Performed by: SURGERY

## 2017-07-18 PROCEDURE — 74011636637 HC RX REV CODE- 636/637: Performed by: SURGERY

## 2017-07-18 PROCEDURE — 94640 AIRWAY INHALATION TREATMENT: CPT

## 2017-07-18 RX ORDER — ALBUTEROL SULFATE 0.83 MG/ML
2.5 SOLUTION RESPIRATORY (INHALATION)
Status: DISCONTINUED | OUTPATIENT
Start: 2017-07-18 | End: 2017-08-05

## 2017-07-18 RX ADMIN — HUMAN INSULIN 2 UNITS: 100 INJECTION, SOLUTION SUBCUTANEOUS at 06:20

## 2017-07-18 RX ADMIN — SODIUM CHLORIDE 100 MG: 900 INJECTION, SOLUTION INTRAVENOUS at 17:57

## 2017-07-18 RX ADMIN — PIPERACILLIN SODIUM,TAZOBACTAM SODIUM 3.38 G: 3; .375 INJECTION, POWDER, FOR SOLUTION INTRAVENOUS at 21:03

## 2017-07-18 RX ADMIN — Medication: at 12:05

## 2017-07-18 RX ADMIN — ASPIRIN 81 MG CHEWABLE TABLET 81 MG: 81 TABLET CHEWABLE at 09:27

## 2017-07-18 RX ADMIN — CLOPIDOGREL BISULFATE 75 MG: 75 TABLET ORAL at 09:27

## 2017-07-18 RX ADMIN — Medication 10 ML: at 18:05

## 2017-07-18 RX ADMIN — CALCIUM GLUCONATE: 94 INJECTION, SOLUTION INTRAVENOUS at 18:01

## 2017-07-18 RX ADMIN — OCTREOTIDE ACETATE 50 MCG: 50 INJECTION, SOLUTION INTRAVENOUS; SUBCUTANEOUS at 17:53

## 2017-07-18 RX ADMIN — Medication 10 ML: at 14:00

## 2017-07-18 RX ADMIN — ENOXAPARIN SODIUM 40 MG: 40 INJECTION SUBCUTANEOUS at 23:53

## 2017-07-18 RX ADMIN — OCTREOTIDE ACETATE 50 MCG: 50 INJECTION, SOLUTION INTRAVENOUS; SUBCUTANEOUS at 21:03

## 2017-07-18 RX ADMIN — Medication 10 ML: at 06:22

## 2017-07-18 RX ADMIN — VANCOMYCIN HYDROCHLORIDE 1250 MG: 10 INJECTION, POWDER, LYOPHILIZED, FOR SOLUTION INTRAVENOUS at 12:34

## 2017-07-18 RX ADMIN — ALTEPLASE 1 MG: 2.2 INJECTION, POWDER, LYOPHILIZED, FOR SOLUTION INTRAVENOUS at 12:04

## 2017-07-18 RX ADMIN — Medication 1 MG: at 21:02

## 2017-07-18 RX ADMIN — DIPHENHYDRAMINE HYDROCHLORIDE 12.5 MG: 50 INJECTION, SOLUTION INTRAMUSCULAR; INTRAVENOUS at 16:41

## 2017-07-18 RX ADMIN — Medication: at 20:47

## 2017-07-18 RX ADMIN — Medication 1 MG: at 06:18

## 2017-07-18 RX ADMIN — HUMAN INSULIN 2 UNITS: 100 INJECTION, SOLUTION SUBCUTANEOUS at 23:52

## 2017-07-18 RX ADMIN — ALTEPLASE 1 MG: 2.2 INJECTION, POWDER, LYOPHILIZED, FOR SOLUTION INTRAVENOUS at 06:18

## 2017-07-18 RX ADMIN — PIPERACILLIN SODIUM,TAZOBACTAM SODIUM 3.38 G: 3; .375 INJECTION, POWDER, FOR SOLUTION INTRAVENOUS at 06:19

## 2017-07-18 RX ADMIN — ALBUTEROL SULFATE 2.5 MG: 2.5 SOLUTION RESPIRATORY (INHALATION) at 18:00

## 2017-07-18 RX ADMIN — OCTREOTIDE ACETATE 50 MCG: 50 INJECTION, SOLUTION INTRAVENOUS; SUBCUTANEOUS at 09:31

## 2017-07-18 RX ADMIN — HUMAN INSULIN 2 UNITS: 100 INJECTION, SOLUTION SUBCUTANEOUS at 00:01

## 2017-07-18 RX ADMIN — HYDROMORPHONE HYDROCHLORIDE 1 MG: 1 INJECTION, SOLUTION INTRAMUSCULAR; INTRAVENOUS; SUBCUTANEOUS at 09:06

## 2017-07-18 RX ADMIN — ONDANSETRON 8 MG: 2 INJECTION INTRAMUSCULAR; INTRAVENOUS at 21:03

## 2017-07-18 RX ADMIN — SODIUM CHLORIDE 40 MG: 9 INJECTION INTRAMUSCULAR; INTRAVENOUS; SUBCUTANEOUS at 09:29

## 2017-07-18 RX ADMIN — PIPERACILLIN SODIUM,TAZOBACTAM SODIUM 3.38 G: 3; .375 INJECTION, POWDER, FOR SOLUTION INTRAVENOUS at 14:25

## 2017-07-18 RX ADMIN — SODIUM CHLORIDE, SODIUM LACTATE, POTASSIUM CHLORIDE, AND CALCIUM CHLORIDE 50 ML/HR: 600; 310; 30; 20 INJECTION, SOLUTION INTRAVENOUS at 18:00

## 2017-07-18 RX ADMIN — Medication 10 ML: at 06:21

## 2017-07-18 RX ADMIN — Medication 1 MG: at 14:19

## 2017-07-18 NOTE — PROGRESS NOTES
Problem: Mobility Impaired (Adult and Pediatric)  Goal: *Acute Goals and Plan of Care (Insert Text)  Physical Therapy Goals  7/12/2017  1. Patient will move from supine to sit and sit to supine , scoot up and down and roll side to side in bed with independence within 7 day(s). 2. Patient will transfer from bed to chair and chair to bed with supervision/set-up using the least restrictive device within 7 day(s). 3. Patient will perform sit to stand with supervision/set-up within 7 day(s). 4. Patient will ambulate with supervision/set-up for 50 feet with the least restrictive device within 7 day(s). PHYSICAL THERAPY TREATMENT  Patient: Valarie Hinkle (46 y.o. female)  Date: 7/18/2017  Diagnosis: N/V intractable post-opt pain  Abdominal pain  poor iv access  DEAD BOWEL  SMALL BOWEL OBSTRUCTION  central line placement  Abdominal pain <principal problem not specified>  Procedure(s) (LRB):  SPECIAL PROCEDURE OUTSIDE OF OR (N/A) 5 Days Post-Op  Precautions:    Chart, physical therapy assessment, plan of care and goals were reviewed. ASSESSMENT:  Chart reviewed and clearance received from nursing for treatment. Pt received asleep supine in bed, but easily woken. Pt agreeable to OOB mobility to the chair, but stated she was not able to ambulate for a longer distance due to increased pain after having her wound vac changed. Pt able to move from supine<>sit with Flavio for management of lines. Pt completed sit<>stand CGA and ambulated 6ft CGA with the RW. Pt left seated in chair, all needs within reach, and nursing notified. Plan next treatment session to ambulate for greater distance as lines and leads allow.    Progression toward goals:  [ ]      Improving appropriately and progressing toward goals  [X]      Improving slowly and progressing toward goals  [ ]      Not making progress toward goals and plan of care will be adjusted       PLAN:  Patient continues to benefit from skilled intervention to address the above impairments. Continue treatment per established plan of care. Discharge Recommendations:  Rehab vs. Home Health PT  Further Equipment Recommendations for Discharge: To be determined depending on pt's progress       SUBJECTIVE:   Patient stated Eileen Floor had a bad night and I am in pain and can't walk much today.       OBJECTIVE DATA SUMMARY:   Critical Behavior:  Neurologic State: Alert, Appropriate for age  Orientation Level: Oriented X4  Cognition: Appropriate decision making, Appropriate safety awareness, Appropriate for age attention/concentration, Follows commands     Functional Mobility Training:  Bed Mobility:     Supine to Sit: Minimum assistance;Assist x1     Scooting: Supervision                    Transfers:  Sit to Stand: Contact guard assistance; Adaptive equipment  Stand to Sit: Contact guard assistance; Adaptive equipment                             Balance:  Sitting: Intact  Standing: Impaired  Standing - Static: Good  Standing - Dynamic : Fair  Ambulation/Gait Training:  Distance (ft): 6 Feet (ft)  Assistive Device: Walker, rolling  Ambulation - Level of Assistance: Contact guard assistance; Adaptive equipment        Gait Abnormalities: Antalgic;Decreased step clearance        Base of Support: Widened     Speed/Liz: Slow  Step Length: Left shortened;Right shortened                       Pain:  Pain Scale 1: Numeric (0 - 10)  Pain Intensity 1: 2  Pain Location 1: Abdomen  Pain Orientation 1: Anterior  Pain Description 1: Aching  Pain Intervention(s) 1: Medication (see MAR)  Activity Tolerance:   Limited due to increased pain with the wound vac  Please refer to the flowsheet for vital signs taken during this treatment.   After treatment:   [X] Patient left in no apparent distress sitting up in chair  [ ] Patient left in no apparent distress in bed  [X] Call bell left within reach  [X] Nursing notified  [ ] Caregiver present  [ ] Bed alarm activated      COMMUNICATION/COLLABORATION:   The patients plan of care was discussed with: Registered Nurse     Camila Israel, PTA   Time Calculation: 19 mins

## 2017-07-18 NOTE — PROGRESS NOTES
Problem: Surgical Pathway Post-Op Day 2 through Discharge  Goal: *No signs and symptoms of infection or wound complications  Outcome: Progressing Towards Goal  Patients wounds are clean dry and intact. All dressings were changed today by wound care. Goal: *Optimal pain control at patients stated goal  Outcome: Progressing Towards Goal  Patient's pain is being controlled by the use of PCA and break through pain medication. Goal: *Adequate urinary output (equal to or greater than 30 milliliters/hour)  Outcome: Progressing Towards Goal  Patient able to void spontaneously. Goal: *Hemodynamically stable  Outcome: Progressing Towards Goal  Patient with stable vitals throughout shift. Goal: *Tolerating diet  Outcome: Progressing Towards Goal  Patient having sips of clears with no nausea or vomiting.

## 2017-07-18 NOTE — PROGRESS NOTES
ID Progress Note  2017    Subjective:     Seems to be having an ok day    Objective:     Antibiotics:  1. Vancomycin  2. Zosyn   3. Eraxis      Vitals:   Visit Vitals    /57 (BP 1 Location: Right arm, BP Patient Position: At rest)    Pulse 96    Temp 98.7 °F (37.1 °C)    Resp 18    Ht 5' 4\" (1.626 m)    Wt 151.6 kg (334 lb 3.5 oz)    LMP 2017 (Approximate)    SpO2 100%    Breastfeeding No    BMI 57.37 kg/m2        Tmax:  Temp (24hrs), Av.4 °F (36.9 °C), Min:97.9 °F (36.6 °C), Max:98.7 °F (37.1 °C)      Exam:  Lungs:  clear to auscultation bilaterally  Heart:  regularly irregular rhythm  Abdomen:  abnormal findings:  tenderness moderate in the entire abdomen, hypoactive bowel sounds    Labs:      Recent Labs      17   0400  17   0312  17   0339   WBC  10.2  13.8*  12.6*   HGB  8.2*  8.5*  8.3*   PLT  377  432*  416*   BUN  25*  27*  26*   CREA  1.05*  1.15*  1.07*       Cultures:     Lab Results   Component Value Date/Time    Specimen Description: URINE 2009 07:45 PM     Lab Results   Component Value Date/Time    Culture result: NO GROWTH ON SOLID MEDIA AT 4 DAYS 2017 12:17 PM    Culture result:  2017 12:17 PM     **METHICILLIN RESISTANT STAPHYLOCOCCUS AUREUS**  ISOLATED FROM THIO BROTH ONLY      Culture result: NO GROWTH 5 DAYS 2017 02:35 PM       Radiology:     Line/Insert Date:           Assessment:     1. Ischemic gut with frozen abdomen  2. Leukocytosis--back up after surgery--trending back down overall--down to normal  3. Cultures negative to date    Objective:     1. Continue IV therapy  2. Follow up cultures and studies  3.  Work up fevers    Elmer Antonio MD

## 2017-07-18 NOTE — PROGRESS NOTES
Problem: Falls - Risk of  Goal: *Absence of falls  Outcome: Progressing Towards Goal  Pt alert and oriented. Call bell and belongings within reach. Family member at bedside    Problem: Pressure Injury - Risk of  Goal: *Prevention of pressure ulcer  Outcome: Progressing Towards Goal  Skin on pressure points cdi with no apparent breakdown at this time. Pt turns self in bed with assistance. purewick external catheter in place.     Problem: Surgical Pathway Post-Op Day 2 through Discharge  Goal: *Tolerating diet  Outcome: Progressing Towards Goal  Tolerating ice chips and sips of clear liquids  Goal: *Demonstrates progressive activity  Outcome: Progressing Towards Goal  Pt more alert and mobile than previously    Problem: Infection - Risk of, Central Venous Catheter-Associated Bloodstream Infection  Goal: *Absence of infection signs and symptoms  Outcome: Progressing Towards Goal  Afebrile, chg dressing within date, curos caps in use

## 2017-07-18 NOTE — PROGRESS NOTES
Bedside shift change report given to Gina (oncoming nurse) by Dustin Mckenna (offgoing nurse).  Report included the following information SBAR, Intake/Output, MAR, Recent Results and Cardiac Rhythm NSR - ST.

## 2017-07-18 NOTE — WOUND CARE
WOCN Note:     Follow-up visit for VAC change and ostomy care. Seen with [facility]. Chart shows:  Admitted for nausea, vomiting, and abdominal pain; history of Crohn's, DVT, bowel perforation with surgery 6/7/17. Exploratory lap, KATHRINE, fistula exclusion and tube placement by Dr. Fabienne James 7/7/17. WBC = 10.2  On zosyn & Vancomycin    Assessment:   Patient is A&O x 4 and mobile - turned fully and independently for assessment. Bed: total care bariatric with air mattress   Diet: TPN with some clears  Pure Wick in use & draining clear yellow. Using PCA and tolerates VAC change fairly well. Buttocks, heels, and sacrum intact and without erythema.       Mucus fistula LLQ: red & Moist and almost at skin level; some mucosal transplantation noted; output is light green mucus. Pouch changed using Activelife pouch.       1. Midline abdominal incision with three drains visible in the base = 21 x 12 x 4.6 cm    Base is 20% moist red mucosal tissue with peristalsis that now reaches the left side drain insertion, 30% scattered moist yellow 30% moist pink with some mucus, 20% granular walls up to margin. 250cc dark burgundy exudate in cannister. RUQ drain to bedside bag clogged with yellow mucus. LUQ drain to bedside bag with green effluent. RLQ drain with end freely floating in wound to wall suction and with ~50cc of green liquid. Periwound intact & without erythema. Dressed using 2 pieces of petroleum gauze as base layer over most of wound bed, 1 white sponge (RLQ drain rests on top of white sponge), 1 black sponge. Suction is achieved with KCI VAC @ 25 mmHg. Wall suction remains but no longer source of primary output. All drain dressings changed and bolstered with gauze to prevent tension. Recommendations:    Please maintain VAC as ordered @ 25 mmHg continuous suction.      Skin Care & Pressure Prevention:  Minimize layers of linen/pads under patient to optimize support surface.    Turn/reposition approximately every 2 hours and offload heels. Discussed above plan with patient & RN. Transition of Care: Plan to follow as needed while admitted to hospital with [facility] changes on Tuesdays and Fridays.      YOLANDA StarkeyN, RN, [facility]  Certified Wound, Ostomy, Continence Nurse  office [phone]  pager [phone] or call  to page

## 2017-07-18 NOTE — PROGRESS NOTES
General Surgery Daily Progress Note    Admit Date: 2017  Post-Operative Day: 11 days post-op (1017)  1. Exploratory laparotomy with lysis of adhesions for 2 hours. 2. Fistula exclusion with feeding tube placement. 3. Wound vacuum-assisted closure placement. For Enterocutaneous fistula with frozen abdomen and abdominal wound infection. 5 Days Post-Op from Procedure(s) with comments:  SPECIAL PROCEDURE OUTSIDE OF OR - central line placement     Subjective:     Last 24 hrs: Patient stable this morning. C/o 2 areas of abdominal pain overnight located near mid-right and inferior-left wound vac site. No NV. Denies fevers or chills. Wound vac change due today & I was present at bedside for entire procedure. Patient tolerated procedure well and reported 8/10 pain for which she used her PCA during wound care & dressing change. Received additional IV analgesia after wound care. Has been happily sipping on apple juice and ice and toelrating well. Denies dysuria. No CP or SOB. Requesting bedside commode. Objective:     Blood pressure 116/55, pulse 87, temperature 98.5 °F (36.9 °C), resp. rate 18, height 5' 4\" (1.626 m), weight 334 lb 3.5 oz (151.6 kg), last menstrual period 2017, SpO2 100 %, not currently breastfeeding. Temp (24hrs), Av.4 °F (36.9 °C), Min:98.1 °F (36.7 °C), Max:98.5 °F (36.9 °C)      _____________________  Physical Exam:     Alert and Oriented, cooperative, in no acute distress. Cardiovascular: RRR, 1+ peripheral edema  Lungs:CTAB shallow BS  Abdomen: soft, faint BS. TTP along wound vac at mid-right & lower-left. No significant surrounding erythema. 2 malecot drains and fred drain intact with bilious contents noted. Ostomy site pink & patent. Feet: skin W/D/I.  No areas of concern noted  Sacrum: skin W/D/I, lotion applied        Assessment:   Active Problems:    Abdominal pain (2017)      Small bowel ischemia ([address] (2017)      Overview: CT abd/pelvis 17 1. The stomach and proximal small bowel loops are distended. There is a       loop of      small bowel in the midabdomen which is mildly dilated and does not       demonstrate      enhancement in the wall and there is suspicion of pneumatosis and this       leads to      a loop of small bowel which demonstrates thickening of the wall and       findings are      suspicious for ischemia. 2. There is extensive mesenteric edema and a small amount of ascites in       the      pelvis. 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 There is nonocclusive thrombus in the SMA. 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 Continue wound care & antibiotics. Continue Lovenox & SCD's  Encourage IS. Bedside commode ok. OOB to chair with assistance as toelrated. Further treatment per Dr. Will Haywood. Data Review:    Recent Labs      07/18/17   0400  07/17/17   0312  07/16/17   0339   WBC  10.2  13.8*  12.6*   HGB  8.2*  8.5*  8.3*   HCT  26.5*  27.1*  26.2*   PLT  377  432*  416*     Recent Labs      07/18/17   0400  07/17/17   0312  07/16/17   0339   NA  140  140  142   K  3.9  4.0  4.0   CL  109*  108  110*   CO2  23  23  26   GLU  128*  119*  125*   BUN  25*  27*  26*   CREA  1.05*  1.15*  1.07*   CA  8.6  8.6  8.7   MG  1.9  1.9  1.9   PHOS  4.1  4.1  4.1     No results for input(s): AML, LPSE in the last 72 hours.         ______________________  Medications:    Current Facility-Administered Medications   Medication Dose Route Frequency    octreotide (SANDOSTATIN) injection 50 mcg  50 mcg IntraVENous TID    vancomycin (VANCOCIN) 1250 mg in  ml infusion  1,250 mg IntraVENous Q24H    clopidogrel (PLAVIX) tablet 75 mg  75 mg Oral DAILY    sodium chloride (NS) flush 10 mL  10 mL InterCATHeter Q24H    sodium chloride (NS) flush 10 mL  10 mL InterCATHeter PRN    sodium chloride (NS) flush 10-40 mL  10-40 mL InterCATHeter Q8H    alteplase (CATHFLO) 1 mg in sterile water (preservative free) 1 mL injection  1 mg InterCATHeter PRN    bacitracin 500 unit/gram packet 1 Packet  1 Packet Topical PRN    0.9% sodium chloride infusion 250 mL  250 mL IntraVENous PRN    lactated Ringers infusion  50 mL/hr IntraVENous CONTINUOUS    fat emulsion 20% (LIPOSYN, INTRALIPID) infusion 500 mL  500 mL IntraVENous Q MON, WED & FRI    LORazepam (ATIVAN) injection 1 mg  1 mg IntraVENous Q8H    HYDROmorphone (PF) (DILAUDID) injection 1 mg  1 mg IntraVENous Q1H PRN    aspirin chewable tablet 81 mg  81 mg Oral DAILY    insulin regular (NOVOLIN R, HUMULIN R) injection   SubCUTAneous Q6H    morphine (PF)  mg/30 ml   IntraVENous CONTINUOUS    glucose chewable tablet 16 g  4 Tab Oral PRN    glucagon (GLUCAGEN) injection 1 mg  1 mg IntraMUSCular PRN    dextrose 10 % infusion 125-250 mL  125-250 mL IntraVENous PRN    pantoprazole (PROTONIX) 40 mg in sodium chloride 0.9 % 10 mL injection  40 mg IntraVENous DAILY    albuterol-ipratropium (DUO-NEB) 2.5 MG-0.5 MG/3 ML  3 mL Nebulization Q6H PRN    prochlorperazine (COMPAZINE) with saline injection 5 mg  5 mg IntraVENous Q6H PRN    anidulafungin (ERAXIS) 100 mg in 0.9% sodium chloride 130 mL IVPB  100 mg IntraVENous Q24H    ondansetron (ZOFRAN) injection 8 mg  8 mg IntraVENous Q6H PRN    sodium chloride (NS) flush 5-10 mL  5-10 mL IntraVENous Q8H    sodium chloride (NS) flush 5-10 mL  5-10 mL IntraVENous PRN    naloxone (NARCAN) injection 0.4 mg  0.4 mg IntraVENous PRN    diphenhydrAMINE (BENADRYL) injection 12.5 mg  12.5 mg IntraVENous Q4H PRN    enoxaparin (LOVENOX) injection 40 mg  40 mg SubCUTAneous Q24H    piperacillin-tazobactam (ZOSYN) 3.375 g in 0.9% sodium chloride (MBP/ADV) 100 mL  3.375 g IntraVENous Q8H    Vancomycin ---Pharmacy to Dose   Other Rx Dosing/Monitoring       Griselda Naval, PA-C  7/18/2017

## 2017-07-19 LAB
ANION GAP BLD CALC-SCNC: 8 MMOL/L (ref 5–15)
BASOPHILS # BLD AUTO: 0 K/UL (ref 0–0.1)
BASOPHILS # BLD: 0 % (ref 0–1)
BUN SERPL-MCNC: 24 MG/DL (ref 6–20)
BUN/CREAT SERPL: 24 (ref 12–20)
CALCIUM SERPL-MCNC: 8.6 MG/DL (ref 8.5–10.1)
CHLORIDE SERPL-SCNC: 106 MMOL/L (ref 97–108)
CO2 SERPL-SCNC: 24 MMOL/L (ref 21–32)
CREAT SERPL-MCNC: 1.02 MG/DL (ref 0.55–1.02)
DIFFERENTIAL METHOD BLD: ABNORMAL
EOSINOPHIL # BLD: 0.5 K/UL (ref 0–0.4)
EOSINOPHIL NFR BLD: 5 % (ref 0–7)
ERYTHROCYTE [DISTWIDTH] IN BLOOD BY AUTOMATED COUNT: 16.2 % (ref 11.5–14.5)
GLUCOSE BLD STRIP.AUTO-MCNC: 105 MG/DL (ref 65–100)
GLUCOSE BLD STRIP.AUTO-MCNC: 132 MG/DL (ref 65–100)
GLUCOSE BLD STRIP.AUTO-MCNC: 147 MG/DL (ref 65–100)
GLUCOSE SERPL-MCNC: 116 MG/DL (ref 65–100)
HCT VFR BLD AUTO: 24.2 % (ref 35–47)
HGB BLD-MCNC: 7.6 G/DL (ref 11.5–16)
LYMPHOCYTES # BLD AUTO: 40 % (ref 12–49)
LYMPHOCYTES # BLD: 3.8 K/UL (ref 0.8–3.5)
MAGNESIUM SERPL-MCNC: 1.9 MG/DL (ref 1.6–2.4)
MCH RBC QN AUTO: 28.6 PG (ref 26–34)
MCHC RBC AUTO-ENTMCNC: 31.4 G/DL (ref 30–36.5)
MCV RBC AUTO: 91 FL (ref 80–99)
MONOCYTES # BLD: 1 K/UL (ref 0–1)
MONOCYTES NFR BLD AUTO: 11 % (ref 5–13)
NEUTS SEG # BLD: 4.2 K/UL (ref 1.8–8)
NEUTS SEG NFR BLD AUTO: 44 % (ref 32–75)
PHOSPHATE SERPL-MCNC: 4.3 MG/DL (ref 2.6–4.7)
PLATELET # BLD AUTO: 353 K/UL (ref 150–400)
POTASSIUM SERPL-SCNC: 3.7 MMOL/L (ref 3.5–5.1)
RBC # BLD AUTO: 2.66 M/UL (ref 3.8–5.2)
RBC MORPH BLD: ABNORMAL
SERVICE CMNT-IMP: ABNORMAL
SODIUM SERPL-SCNC: 138 MMOL/L (ref 136–145)
WBC # BLD AUTO: 9.5 K/UL (ref 3.6–11)

## 2017-07-19 PROCEDURE — 74011250636 HC RX REV CODE- 250/636: Performed by: NURSE PRACTITIONER

## 2017-07-19 PROCEDURE — 85025 COMPLETE CBC W/AUTO DIFF WBC: CPT | Performed by: SURGERY

## 2017-07-19 PROCEDURE — 74011250636 HC RX REV CODE- 250/636: Performed by: INTERNAL MEDICINE

## 2017-07-19 PROCEDURE — 74011250636 HC RX REV CODE- 250/636: Performed by: SURGERY

## 2017-07-19 PROCEDURE — 97116 GAIT TRAINING THERAPY: CPT

## 2017-07-19 PROCEDURE — 74011000250 HC RX REV CODE- 250: Performed by: NURSE PRACTITIONER

## 2017-07-19 PROCEDURE — 74011250636 HC RX REV CODE- 250/636: Performed by: PHYSICIAN ASSISTANT

## 2017-07-19 PROCEDURE — 74011000258 HC RX REV CODE- 258: Performed by: PHYSICIAN ASSISTANT

## 2017-07-19 PROCEDURE — 74011000250 HC RX REV CODE- 250: Performed by: PHYSICIAN ASSISTANT

## 2017-07-19 PROCEDURE — 84100 ASSAY OF PHOSPHORUS: CPT | Performed by: SURGERY

## 2017-07-19 PROCEDURE — 82962 GLUCOSE BLOOD TEST: CPT

## 2017-07-19 PROCEDURE — 3331090001 HH PPS REVENUE CREDIT

## 2017-07-19 PROCEDURE — 3331090002 HH PPS REVENUE DEBIT

## 2017-07-19 PROCEDURE — 74011000258 HC RX REV CODE- 258: Performed by: INTERNAL MEDICINE

## 2017-07-19 PROCEDURE — 83735 ASSAY OF MAGNESIUM: CPT | Performed by: SURGERY

## 2017-07-19 PROCEDURE — 74011250637 HC RX REV CODE- 250/637: Performed by: SURGERY

## 2017-07-19 PROCEDURE — 74011636637 HC RX REV CODE- 636/637: Performed by: PHYSICIAN ASSISTANT

## 2017-07-19 PROCEDURE — 36415 COLL VENOUS BLD VENIPUNCTURE: CPT | Performed by: SURGERY

## 2017-07-19 PROCEDURE — 74011250636 HC RX REV CODE- 250/636: Performed by: PHYSICAL MEDICINE & REHABILITATION

## 2017-07-19 PROCEDURE — 74011636637 HC RX REV CODE- 636/637: Performed by: SURGERY

## 2017-07-19 PROCEDURE — 65660000000 HC RM CCU STEPDOWN

## 2017-07-19 PROCEDURE — 74011000250 HC RX REV CODE- 250: Performed by: SURGERY

## 2017-07-19 PROCEDURE — 80048 BASIC METABOLIC PNL TOTAL CA: CPT | Performed by: SURGERY

## 2017-07-19 PROCEDURE — 74011000258 HC RX REV CODE- 258: Performed by: SURGERY

## 2017-07-19 PROCEDURE — 74011000250 HC RX REV CODE- 250: Performed by: INTERNAL MEDICINE

## 2017-07-19 PROCEDURE — C9113 INJ PANTOPRAZOLE SODIUM, VIA: HCPCS | Performed by: SURGERY

## 2017-07-19 RX ADMIN — HUMAN INSULIN 2 UNITS: 100 INJECTION, SOLUTION SUBCUTANEOUS at 06:23

## 2017-07-19 RX ADMIN — SODIUM CHLORIDE 100 MG: 900 INJECTION, SOLUTION INTRAVENOUS at 18:31

## 2017-07-19 RX ADMIN — Medication: at 18:55

## 2017-07-19 RX ADMIN — Medication 10 ML: at 13:39

## 2017-07-19 RX ADMIN — Medication 1 MG: at 13:36

## 2017-07-19 RX ADMIN — ONDANSETRON 8 MG: 2 INJECTION INTRAMUSCULAR; INTRAVENOUS at 11:02

## 2017-07-19 RX ADMIN — Medication 1 MG: at 22:32

## 2017-07-19 RX ADMIN — PIPERACILLIN SODIUM,TAZOBACTAM SODIUM 3.38 G: 3; .375 INJECTION, POWDER, FOR SOLUTION INTRAVENOUS at 06:22

## 2017-07-19 RX ADMIN — OCTREOTIDE ACETATE 50 MCG: 50 INJECTION, SOLUTION INTRAVENOUS; SUBCUTANEOUS at 22:49

## 2017-07-19 RX ADMIN — CALCIUM GLUCONATE: 94 INJECTION, SOLUTION INTRAVENOUS at 18:28

## 2017-07-19 RX ADMIN — Medication 1 MG: at 06:23

## 2017-07-19 RX ADMIN — I.V. FAT EMULSION 500 ML: 20 EMULSION INTRAVENOUS at 18:37

## 2017-07-19 RX ADMIN — VANCOMYCIN HYDROCHLORIDE 1250 MG: 10 INJECTION, POWDER, LYOPHILIZED, FOR SOLUTION INTRAVENOUS at 13:36

## 2017-07-19 RX ADMIN — Medication 10 ML: at 17:00

## 2017-07-19 RX ADMIN — Medication 10 ML: at 22:32

## 2017-07-19 RX ADMIN — OCTREOTIDE ACETATE 50 MCG: 50 INJECTION, SOLUTION INTRAVENOUS; SUBCUTANEOUS at 10:47

## 2017-07-19 RX ADMIN — Medication 10 ML: at 22:33

## 2017-07-19 RX ADMIN — PIPERACILLIN SODIUM,TAZOBACTAM SODIUM 3.38 G: 3; .375 INJECTION, POWDER, FOR SOLUTION INTRAVENOUS at 22:32

## 2017-07-19 RX ADMIN — SODIUM CHLORIDE 5 MG: 9 INJECTION INTRAMUSCULAR; INTRAVENOUS; SUBCUTANEOUS at 02:00

## 2017-07-19 RX ADMIN — CLOPIDOGREL BISULFATE 75 MG: 75 TABLET ORAL at 10:43

## 2017-07-19 RX ADMIN — Medication: at 11:03

## 2017-07-19 RX ADMIN — PIPERACILLIN SODIUM,TAZOBACTAM SODIUM 3.38 G: 3; .375 INJECTION, POWDER, FOR SOLUTION INTRAVENOUS at 14:12

## 2017-07-19 RX ADMIN — Medication 10 ML: at 13:38

## 2017-07-19 RX ADMIN — SODIUM CHLORIDE 40 MG: 9 INJECTION INTRAMUSCULAR; INTRAVENOUS; SUBCUTANEOUS at 10:44

## 2017-07-19 RX ADMIN — ONDANSETRON 8 MG: 2 INJECTION INTRAMUSCULAR; INTRAVENOUS at 17:34

## 2017-07-19 RX ADMIN — OCTREOTIDE ACETATE 50 MCG: 50 INJECTION, SOLUTION INTRAVENOUS; SUBCUTANEOUS at 17:35

## 2017-07-19 RX ADMIN — ASPIRIN 81 MG CHEWABLE TABLET 81 MG: 81 TABLET CHEWABLE at 10:43

## 2017-07-19 NOTE — PROGRESS NOTES
Bedside and Verbal shift change report given to haja (oncoming nurse) by Shayne Reveles (offgoing nurse). Report included the following information SBAR, Kardex, Intake/Output, Recent Results and Cardiac Rhythm sinus tach/nsr.

## 2017-07-19 NOTE — PROGRESS NOTES
NUTRITION COMPLETE ASSESSMENT    RECOMMENDATIONS:   1. Consider increasing TPN to 2400 mL/day (85 mL/hr x 1 hour; 172 mL/hr x 13 hours; 85 mL/hr x last hour) secondary to increased drain output and to meet 100% of estimated kcal needs (currently meeting 95%)  -- Ask Pharmacy if trace elements can be altered to daily despite shortage secondary to large volume drain output and plan for pt to be on chronic TPN    2. If increase in TPN volume not desired, may need to increase maintenance IVF if drain output remains elevated    2. Please document all liquid po intake into I/O's so that this may be accounted for in drain output    3. Continue daily weights (standing scale preferred as bedscale is currently running ~10 kg heavier)     Interventions/Plan:   Food/Nutrient Delivery: TPN as sole source of nutrition    Assessment:   Reason for Assessment:   [x]Reassessment     Diet: NPO with sips of clears    Nutritionally Significant Medications: [x] Reviewed & Includes: insulin correction scale; sandostatin 3x/day; zofran q 6 hours prn; protonix daily; vancomycin    TPN: 5%AA D20 @ 85 mL/hr x 1 hour; 170 mL/hr x 12 hours; 85 mL/hr x 1 hour   + MVI, thiamine (100 mg), 14 units insulin/L, zinc sulfate (10 mg 3x/week)   + 20% lipids, 500 mL 3x/week    Subjective:  Pt in good spirits this morning. Working with PT and was able to get on the standing scale. Objective:  Chart reviewed, discussed with RN, PA and team during interdisciplinary rounds. Pt remains on TPN as sole source of nutrition. She is taking variable amounts of clear liquids and surgery is ok with juices (they are coming out of the drains). Drain + wound vac output has been ~2000 mL per day. She has already put out 1900 mL today. May need to resume maintenance IVF if this worsens. TPN (as above) is providing a daily average of 2374 kcal, 111 g protein and 2210 mL-- meeting 95% and 100% of estimated kcal and protein needs, respectively.   With increased drain output, may need to increase to better meet calorie needs. Would increase TPN to 2400 mL/hr (see above cycle) to meet 100% of estimated kcal needs (2541 kcal, 120 g protein in 2400 mL). Current weight is closest to original weight when needs were calculated. Did not re-estimate nutrition needs. Estimated Nutrition Needs:   Kcals/day: 9476 Kcals/day (0008-7087 kcal/day [facility]. Jeor x 1.2-1.3))  Protein: 110 g (2.0g/kg IBW)  Fluid:  (1  ml/kcal)     Based On: Vanna Garcia  Weight Used: Actual wt (143.4 kg (wt on admit))    Pt expected to meet estimated nutrient needs:  [x]   Yes  (via TPN)    Nutrition Diagnosis:   1.  Inadequate oral food/beverage intake related to unresectable ischemic bowel  as evidenced by NPO with TPN as sole source of nutrition    Goals:     TPN to meet at least 90% of estimated needs over the next 5-7 days     Monitoring & Evaluation:    - Enteral/parenteral nutrition intake   - Electrolyte and renal profile, Weight/weight change, GI profile     Previous Nutrition Goals Met:  Yes  Previous Recommendations:      Yes    Education & Discharge Needs:   [x] None Identified   [] Identified and addressed    [x] Participated in care plan, discharge planning, and/or interdisciplinary rounds        Cultural, Sabianism and ethnic food preferences identified:   None    Skin Integrity: []Intact  [x]Other: see wound and drain documentation  Edema: []None [x]Other: 1+  Last BM: 7/17/17  Food Allergies: [x]None []Other    Anthropometrics:    Weight Loss Metrics 7/19/2017 6/25/2017 6/25/2017 6/25/2017 6/22/2017 6/12/2017 6/6/2017   Today's Wt 314 lb 9.5 oz - 343 lb - 343 lb 343 lb 14.7 oz -   BMI - 54 kg/m2 - 58.88 kg/m2 58.88 kg/m2 - 59.03 kg/m2      Last 3 Recorded Weights in this Encounter    07/18/17 0414 07/19/17 0001 07/19/17 1510   Weight: 151.6 kg (334 lb 3.5 oz) 152 kg (335 lb 1.6 oz) 142.7 kg (314 lb 9.5 oz)      Weight Source: Standing scale (comment) (standing scale during PT session)  Height: 5' 4\" (162.6 cm),    Body mass index is 54 kg/(m^2). IBW : 54.4 kg (120 lb), % IBW (Calculated): 263.45 %   ,      Labs:    Lab Results   Component Value Date/Time    Sodium 138 07/19/2017 02:10 AM    Potassium 3.7 07/19/2017 02:10 AM    Chloride 106 07/19/2017 02:10 AM    CO2 24 07/19/2017 02:10 AM    Glucose 116 07/19/2017 02:10 AM    BUN 24 07/19/2017 02:10 AM    Creatinine 1.02 07/19/2017 02:10 AM    Calcium 8.6 07/19/2017 02:10 AM    Magnesium 1.9 07/19/2017 02:10 AM    Phosphorus 4.3 07/19/2017 02:10 AM    Albumin 1.5 07/08/2017 04:42 AM     No results found for: HBA1C, HGBE8, VOB7GHKN, EJZ1SNTD  Lab Results   Component Value Date/Time    Glucose 116 07/19/2017 02:10 AM    Glucose (POC) 132 07/19/2017 12:18 PM      Lab Results   Component Value Date/Time    ALT (SGPT) 13 07/08/2017 04:42 AM    AST (SGOT) 11 07/08/2017 04:42 AM    Alk.  phosphatase 120 07/08/2017 04:42 AM    Bilirubin, direct 0.1 07/07/2009 06:38 PM    Bilirubin, total 0.6 07/08/2017 04:42 AM      [address]

## 2017-07-19 NOTE — PROGRESS NOTES
Bedside shift change report given to [facility] (oncoming nurse) by Nelsy Loja RN (offgoing nurse). Report included the following information SBAR, Kardex and Recent Results.

## 2017-07-19 NOTE — PROGRESS NOTES
ID Progress Note  2017    Subjective:     Seems to be having an ok day--walked some today    Objective:     Antibiotics:  1. Vancomycin  2. Zosyn   3. Eraxis      Vitals:   Visit Vitals    /62 (BP 1 Location: Left arm, BP Patient Position: At rest)    Pulse 92    Temp 97.8 °F (36.6 °C)    Resp 18    Ht 5' 4\" (1.626 m)    Wt 152 kg (335 lb 1.6 oz)    LMP 2017 (Approximate)    SpO2 99%    Breastfeeding No    BMI 57.52 kg/m2        Tmax:  Temp (24hrs), Av.2 °F (36.8 °C), Min:97.8 °F (36.6 °C), Max:98.7 °F (37.1 °C)      Exam:  Lungs:  clear to auscultation bilaterally  Heart:  regularly irregular rhythm  Abdomen:  abnormal findings:  tenderness moderate in the entire abdomen, hypoactive bowel sounds    Labs:      Recent Labs      17   0210  17   0400  17   0312   WBC  9.5  10.2  13.8*   HGB  7.6*  8.2*  8.5*   PLT  353  377  432*   BUN  24*  25*  27*   CREA  1.02  1.05*  1.15*       Cultures:     Lab Results   Component Value Date/Time    Specimen Description: URINE 2009 07:45 PM     Lab Results   Component Value Date/Time    Culture result: NO GROWTH ON SOLID MEDIA AT 4 DAYS 2017 12:17 PM    Culture result:  2017 12:17 PM     **METHICILLIN RESISTANT STAPHYLOCOCCUS AUREUS**  ISOLATED FROM THIO BROTH ONLY      Culture result: NO GROWTH 5 DAYS 2017 02:35 PM       Radiology:     Line/Insert Date:           Assessment:     1. Ischemic gut with frozen abdomen  2. Leukocytosis--back up after surgery--trending back down overall--down to normal  3. Cultures negative to date    Objective:     1. Continue IV therapy  2. Follow up cultures and studies  3.  Work up fevers    Niki Crawford MD

## 2017-07-19 NOTE — PROGRESS NOTES
Surgery Progress Note    Admit Date: 6/25/2017      Subjective:      Pt complains of \"my stomach is sore\", no acute issues overnight, she is OOB to chair \"it good for me\". Pts pain present - adequately treated and pt is using PCA . No SOB. No CP. Pt is working with PT   Patient 's current diet is sips for comfort. .. Pt reports  no nausea and no vomiting. Pt reports no fever or chills    Bowel Movements: ileostomy has not been productive,          Objective:     Patient Vitals for the past 8 hrs:   BP Temp Pulse Resp SpO2   07/19/17 0722 106/43 98.2 °F (36.8 °C) 93 17 100 %   07/19/17 0430 124/61 98.2 °F (36.8 °C) 98 16 99 %     07/19 0701 - 07/19 1900  In: -   Out: 1300 [Urine:450; Drains:850]  07/17 1901 - 07/19 0700  In: 7776.4 [P.O.:500; I.V.:7276.4]  Out: 7000 [Urine:3200; Drains:3545]ip  Physical Exam:    General: alert, cooperative, no distress, OOB to chair   Cardiac: normal S1 and S2  Lungs: clear anterior   Abdomen: soft, nondistended, tenderness mild - in the lower abdomen, left sided drain with bilious output, ileostomy LLQ pink- no output in appliance  VAC in place with good seal, some erythema noted wound edges.     Right lower drain is draining to wall suction and some bloody output noted this AM in tubing and cannister  R upper drain some brownish serous output   Wounds:VAC as above   Neuro: alert, oriented x 3, no defects noted in general exam.  Extremities: edema mild      CBC: Lab Results   Component Value Date/Time    WBC 9.5 07/19/2017 02:10 AM    RBC 2.66 07/19/2017 02:10 AM    HGB 7.6 07/19/2017 02:10 AM    HCT 24.2 07/19/2017 02:10 AM    PLATELET 764 54/60/3080 02:10 AM     BMP: Lab Results   Component Value Date/Time    Glucose 116 07/19/2017 02:10 AM    Sodium 138 07/19/2017 02:10 AM    Potassium 3.7 07/19/2017 02:10 AM    Chloride 106 07/19/2017 02:10 AM    CO2 24 07/19/2017 02:10 AM    BUN 24 07/19/2017 02:10 AM    Creatinine 1.02 07/19/2017 02:10 AM    Calcium 8.6 07/19/2017 02:10 AM CMP:  Lab Results   Component Value Date/Time    Glucose 116 07/19/2017 02:10 AM    Sodium 138 07/19/2017 02:10 AM    Potassium 3.7 07/19/2017 02:10 AM    Chloride 106 07/19/2017 02:10 AM    CO2 24 07/19/2017 02:10 AM    BUN 24 07/19/2017 02:10 AM    Creatinine 1.02 07/19/2017 02:10 AM    Calcium 8.6 07/19/2017 02:10 AM    Anion gap 8 07/19/2017 02:10 AM    BUN/Creatinine ratio 24 07/19/2017 02:10 AM    Alk.  phosphatase 120 07/08/2017 04:42 AM    Protein, total 7.0 07/08/2017 04:42 AM    Albumin 1.5 07/08/2017 04:42 AM    Globulin 5.5 07/08/2017 04:42 AM    A-G Ratio 0.3 07/08/2017 04:42 AM       Radiology review: na        Assessment:   Pt is POD #21 s/p EXP lap with extensive KATHRINE / SMA stent placement for thrombus/ ischemic bowel    POD 12 s/p Exp lap/ KATHRINE/ drainage/ exclusion of fistula/ VAC placement    EC Fistula  Hx crohns DZ/ colectomy / ileostomy  Malnutrition  Acute on chronic anemia     Plan:   Diet: TPN renewed/ sips for comfort  Activity: out of bed and ambulate  Pain management--continue PCA   GI and DVT prophylaxis  Drains on exam pt has bloody output via right lower drain with blood in wall canister noted ? new--hold her lovenox, continue ASA and plavix with SMA stent in place, drop in hgb noted, no acute indication to transfuse at present but monitoring--hemodymics stable   I have asked Luz to change wall canister so it is more obvious if there is any  Ongoing bloody output via that drain today, the other drains are bilious or brownish serous,  VAC in place with good seal on Tuesday/ Friday Schedule  High output via drains and VAC but we seem to be keeping up well as her fluid statis is ok, ileostomy not productive on exam   Meds: Hold lovenox, continue sandostatin   Labs: hgb 7.6 today,   Antibiotics: vancomycin, Zosyn and eraxis   Further plan per Dr. Rafita Nunez --pt DW him via phone  Noe Stewart PA-C

## 2017-07-19 NOTE — PROGRESS NOTES
Problem: Mobility Impaired (Adult and Pediatric)  Goal: *Acute Goals and Plan of Care (Insert Text)  Physical Therapy Goals  7/12/2017  1. Patient will move from supine to sit and sit to supine , scoot up and down and roll side to side in bed with independence within 7 day(s). 2. Patient will transfer from bed to chair and chair to bed with supervision/set-up using the least restrictive device within 7 day(s). 3. Patient will perform sit to stand with supervision/set-up within 7 day(s). 4. Patient will ambulate with supervision/set-up for 50 feet with the least restrictive device within 7 day(s). PHYSICAL THERAPY TREATMENT  Patient: Florin Gonzales (46 y.o. female)  Date: 7/19/2017  Diagnosis: N/V intractable post-opt pain  Abdominal pain  poor iv access  DEAD BOWEL  SMALL BOWEL OBSTRUCTION  central line placement  Abdominal pain <principal problem not specified>  Procedure(s) (LRB):  SPECIAL PROCEDURE OUTSIDE OF OR (N/A) 6 Days Post-Op  Precautions:    Chart, physical therapy assessment, plan of care and goals were reviewed. ASSESSMENT:  Chart reviewed and clearance received from nursing for treatment. Pt received seated in chair and agreeable to ambulate with PT. Pt performed sit<>stand with CGA and able to ambulate 120ft CGA with the RW. Pt able to increase ambulation distance significantly, furthest ambulation distance previously was 18ft. Pt exhibited no SOB and did not require any standing rest breaks. Pt returned to room and was left seated in the chair with all needs in reach and nursing notified. Pt would benefit from either rehab or HHPT to improve pt's overall strength and endurance.    Progression toward goals:  [X]      Improving appropriately and progressing toward goals  [ ]      Improving slowly and progressing toward goals  [ ]      Not making progress toward goals and plan of care will be adjusted       PLAN:  Patient continues to benefit from skilled intervention to address the above impairments. Continue treatment per established plan of care. Discharge Recommendations:  Rehab vs. HHPT  Further Equipment Recommendations for Discharge: To be determined       SUBJECTIVE:   Patient stated I'm not in as much pain today.       OBJECTIVE DATA SUMMARY:   Critical Behavior:  Neurologic State: Alert  Orientation Level: Oriented X4  Cognition: Follows commands     Functional Mobility Training:  Bed Mobility:                                Transfers:  Sit to Stand: Contact guard assistance; Adaptive equipment  Stand to Sit: Contact guard assistance; Adaptive equipment                             Balance:  Sitting: Intact  Standing: Impaired  Standing - Static: Good  Standing - Dynamic : Fair  Ambulation/Gait Training:  Distance (ft): 120 Feet (ft)  Assistive Device: Walker, rolling  Ambulation - Level of Assistance: Contact guard assistance; Adaptive equipment;Assist x1        Gait Abnormalities: Antalgic;Decreased step clearance        Base of Support: Widened     Speed/Liz: Slow  Step Length: Left shortened;Right shortened                                            Pain:  Pain Scale 1: Numeric (0 - 10)  Pain Intensity 1: 7  Pain Location 1: Abdomen  Pain Orientation 1: Anterior  Pain Description 1: Aching  Pain Intervention(s) 1: Encouraged PCA  Activity Tolerance:   Fair, able to increase ambulation distance  Please refer to the flowsheet for vital signs taken during this treatment.   After treatment:   [X] Patient left in no apparent distress sitting up in chair  [ ] Patient left in no apparent distress in bed  [X] Call bell left within reach  [X] Nursing notified  [ ] Caregiver present  [ ] Bed alarm activated      COMMUNICATION/COLLABORATION:   The patients plan of care was discussed with: Registered Nurse      Odilia Matthew PTA   Time Calculation: 21 mins

## 2017-07-20 LAB
ANION GAP BLD CALC-SCNC: 7 MMOL/L (ref 5–15)
BASOPHILS # BLD AUTO: 0 K/UL (ref 0–0.1)
BASOPHILS # BLD: 0 % (ref 0–1)
BUN SERPL-MCNC: 24 MG/DL (ref 6–20)
BUN/CREAT SERPL: 23 (ref 12–20)
CALCIUM SERPL-MCNC: 8.3 MG/DL (ref 8.5–10.1)
CHLORIDE SERPL-SCNC: 107 MMOL/L (ref 97–108)
CO2 SERPL-SCNC: 24 MMOL/L (ref 21–32)
CREAT SERPL-MCNC: 1.03 MG/DL (ref 0.55–1.02)
DATE LAST DOSE: ABNORMAL
EOSINOPHIL # BLD: 0.7 K/UL (ref 0–0.4)
EOSINOPHIL NFR BLD: 8 % (ref 0–7)
ERYTHROCYTE [DISTWIDTH] IN BLOOD BY AUTOMATED COUNT: 16.2 % (ref 11.5–14.5)
GLUCOSE BLD STRIP.AUTO-MCNC: 123 MG/DL (ref 65–100)
GLUCOSE BLD STRIP.AUTO-MCNC: 136 MG/DL (ref 65–100)
GLUCOSE BLD STRIP.AUTO-MCNC: 144 MG/DL (ref 65–100)
GLUCOSE BLD STRIP.AUTO-MCNC: 154 MG/DL (ref 65–100)
GLUCOSE BLD STRIP.AUTO-MCNC: 97 MG/DL (ref 65–100)
GLUCOSE SERPL-MCNC: 158 MG/DL (ref 65–100)
HCT VFR BLD AUTO: 24.2 % (ref 35–47)
HGB BLD-MCNC: 7.5 G/DL (ref 11.5–16)
LYMPHOCYTES # BLD AUTO: 36 % (ref 12–49)
LYMPHOCYTES # BLD: 3.1 K/UL (ref 0.8–3.5)
MAGNESIUM SERPL-MCNC: 2 MG/DL (ref 1.6–2.4)
MCH RBC QN AUTO: 28.2 PG (ref 26–34)
MCHC RBC AUTO-ENTMCNC: 31 G/DL (ref 30–36.5)
MCV RBC AUTO: 91 FL (ref 80–99)
MONOCYTES # BLD: 0.9 K/UL (ref 0–1)
MONOCYTES NFR BLD AUTO: 10 % (ref 5–13)
NEUTS SEG # BLD: 3.8 K/UL (ref 1.8–8)
NEUTS SEG NFR BLD AUTO: 46 % (ref 32–75)
PHOSPHATE SERPL-MCNC: 4.5 MG/DL (ref 2.6–4.7)
PLATELET # BLD AUTO: 338 K/UL (ref 150–400)
POTASSIUM SERPL-SCNC: 3.8 MMOL/L (ref 3.5–5.1)
RBC # BLD AUTO: 2.66 M/UL (ref 3.8–5.2)
REPORTED DOSE,DOSE: ABNORMAL UNITS
REPORTED DOSE/TIME,TMG: ABNORMAL
SERVICE CMNT-IMP: ABNORMAL
SERVICE CMNT-IMP: NORMAL
SODIUM SERPL-SCNC: 138 MMOL/L (ref 136–145)
VANCOMYCIN TROUGH SERPL-MCNC: 12 UG/ML (ref 5–10)
WBC # BLD AUTO: 8.5 K/UL (ref 3.6–11)

## 2017-07-20 PROCEDURE — 83735 ASSAY OF MAGNESIUM: CPT | Performed by: SURGERY

## 2017-07-20 PROCEDURE — 3331090001 HH PPS REVENUE CREDIT

## 2017-07-20 PROCEDURE — 74011000250 HC RX REV CODE- 250: Performed by: ANESTHESIOLOGY

## 2017-07-20 PROCEDURE — 74011250636 HC RX REV CODE- 250/636: Performed by: PHYSICIAN ASSISTANT

## 2017-07-20 PROCEDURE — 74011250636 HC RX REV CODE- 250/636: Performed by: NURSE PRACTITIONER

## 2017-07-20 PROCEDURE — C9113 INJ PANTOPRAZOLE SODIUM, VIA: HCPCS | Performed by: SURGERY

## 2017-07-20 PROCEDURE — 74011250636 HC RX REV CODE- 250/636: Performed by: EMERGENCY MEDICINE

## 2017-07-20 PROCEDURE — 80048 BASIC METABOLIC PNL TOTAL CA: CPT | Performed by: SURGERY

## 2017-07-20 PROCEDURE — 74011000258 HC RX REV CODE- 258: Performed by: PHYSICIAN ASSISTANT

## 2017-07-20 PROCEDURE — 74011000250 HC RX REV CODE- 250: Performed by: PHYSICIAN ASSISTANT

## 2017-07-20 PROCEDURE — 74011250636 HC RX REV CODE- 250/636: Performed by: ANESTHESIOLOGY

## 2017-07-20 PROCEDURE — 74011250637 HC RX REV CODE- 250/637: Performed by: SURGERY

## 2017-07-20 PROCEDURE — 74011636637 HC RX REV CODE- 636/637: Performed by: PHYSICIAN ASSISTANT

## 2017-07-20 PROCEDURE — 65660000000 HC RM CCU STEPDOWN

## 2017-07-20 PROCEDURE — 3331090002 HH PPS REVENUE DEBIT

## 2017-07-20 PROCEDURE — 97116 GAIT TRAINING THERAPY: CPT

## 2017-07-20 PROCEDURE — 74011000250 HC RX REV CODE- 250: Performed by: SURGERY

## 2017-07-20 PROCEDURE — 80202 ASSAY OF VANCOMYCIN: CPT | Performed by: SURGERY

## 2017-07-20 PROCEDURE — 84100 ASSAY OF PHOSPHORUS: CPT | Performed by: SURGERY

## 2017-07-20 PROCEDURE — 77030010545

## 2017-07-20 PROCEDURE — 82962 GLUCOSE BLOOD TEST: CPT

## 2017-07-20 PROCEDURE — 36415 COLL VENOUS BLD VENIPUNCTURE: CPT | Performed by: SURGERY

## 2017-07-20 PROCEDURE — 97110 THERAPEUTIC EXERCISES: CPT

## 2017-07-20 PROCEDURE — 74011250636 HC RX REV CODE- 250/636: Performed by: SURGERY

## 2017-07-20 PROCEDURE — 74011636637 HC RX REV CODE- 636/637: Performed by: SURGERY

## 2017-07-20 PROCEDURE — 74011000258 HC RX REV CODE- 258: Performed by: SURGERY

## 2017-07-20 PROCEDURE — 74011000258 HC RX REV CODE- 258: Performed by: INTERNAL MEDICINE

## 2017-07-20 PROCEDURE — 74011250636 HC RX REV CODE- 250/636: Performed by: PHYSICAL MEDICINE & REHABILITATION

## 2017-07-20 PROCEDURE — 74011250636 HC RX REV CODE- 250/636: Performed by: INTERNAL MEDICINE

## 2017-07-20 PROCEDURE — 85025 COMPLETE CBC W/AUTO DIFF WBC: CPT | Performed by: SURGERY

## 2017-07-20 RX ORDER — LORAZEPAM 2 MG/ML
0.75 INJECTION INTRAMUSCULAR EVERY 8 HOURS
Status: DISCONTINUED | OUTPATIENT
Start: 2017-07-20 | End: 2017-08-24 | Stop reason: HOSPADM

## 2017-07-20 RX ADMIN — SODIUM CHLORIDE 40 MG: 9 INJECTION INTRAMUSCULAR; INTRAVENOUS; SUBCUTANEOUS at 09:54

## 2017-07-20 RX ADMIN — Medication: at 08:14

## 2017-07-20 RX ADMIN — PIPERACILLIN SODIUM,TAZOBACTAM SODIUM 3.38 G: 3; .375 INJECTION, POWDER, FOR SOLUTION INTRAVENOUS at 05:36

## 2017-07-20 RX ADMIN — ALTEPLASE 1 MG: 2.2 INJECTION, POWDER, LYOPHILIZED, FOR SOLUTION INTRAVENOUS at 13:25

## 2017-07-20 RX ADMIN — Medication 10 ML: at 22:32

## 2017-07-20 RX ADMIN — Medication 10 ML: at 05:37

## 2017-07-20 RX ADMIN — VANCOMYCIN HYDROCHLORIDE 1250 MG: 10 INJECTION, POWDER, LYOPHILIZED, FOR SOLUTION INTRAVENOUS at 13:24

## 2017-07-20 RX ADMIN — Medication: at 09:44

## 2017-07-20 RX ADMIN — Medication 10 ML: at 14:00

## 2017-07-20 RX ADMIN — Medication 0.76 MG: at 22:31

## 2017-07-20 RX ADMIN — SODIUM CHLORIDE 100 MG: 900 INJECTION, SOLUTION INTRAVENOUS at 17:38

## 2017-07-20 RX ADMIN — ONDANSETRON 8 MG: 2 INJECTION INTRAMUSCULAR; INTRAVENOUS at 09:54

## 2017-07-20 RX ADMIN — PIPERACILLIN SODIUM,TAZOBACTAM SODIUM 3.38 G: 3; .375 INJECTION, POWDER, FOR SOLUTION INTRAVENOUS at 13:19

## 2017-07-20 RX ADMIN — Medication 1 MG: at 13:21

## 2017-07-20 RX ADMIN — OCTREOTIDE ACETATE 50 MCG: 50 INJECTION, SOLUTION INTRAVENOUS; SUBCUTANEOUS at 17:24

## 2017-07-20 RX ADMIN — HUMAN INSULIN 2 UNITS: 100 INJECTION, SOLUTION SUBCUTANEOUS at 01:14

## 2017-07-20 RX ADMIN — ONDANSETRON 8 MG: 2 INJECTION INTRAMUSCULAR; INTRAVENOUS at 17:24

## 2017-07-20 RX ADMIN — OCTREOTIDE ACETATE 50 MCG: 50 INJECTION, SOLUTION INTRAVENOUS; SUBCUTANEOUS at 09:54

## 2017-07-20 RX ADMIN — Medication: at 15:52

## 2017-07-20 RX ADMIN — HUMAN INSULIN 2 UNITS: 100 INJECTION, SOLUTION SUBCUTANEOUS at 23:42

## 2017-07-20 RX ADMIN — ASPIRIN 81 MG CHEWABLE TABLET 81 MG: 81 TABLET CHEWABLE at 09:54

## 2017-07-20 RX ADMIN — SODIUM CHLORIDE, SODIUM LACTATE, POTASSIUM CHLORIDE, AND CALCIUM CHLORIDE 75 ML/HR: 600; 310; 30; 20 INJECTION, SOLUTION INTRAVENOUS at 09:51

## 2017-07-20 RX ADMIN — CLOPIDOGREL BISULFATE 75 MG: 75 TABLET ORAL at 09:54

## 2017-07-20 RX ADMIN — ONDANSETRON 8 MG: 2 INJECTION INTRAMUSCULAR; INTRAVENOUS at 23:43

## 2017-07-20 RX ADMIN — Medication 1 MG: at 05:36

## 2017-07-20 RX ADMIN — PIPERACILLIN SODIUM,TAZOBACTAM SODIUM 3.38 G: 3; .375 INJECTION, POWDER, FOR SOLUTION INTRAVENOUS at 22:31

## 2017-07-20 RX ADMIN — Medication 10 ML: at 16:28

## 2017-07-20 RX ADMIN — OCTREOTIDE ACETATE 50 MCG: 50 INJECTION, SOLUTION INTRAVENOUS; SUBCUTANEOUS at 22:31

## 2017-07-20 RX ADMIN — CALCIUM GLUCONATE: 94 INJECTION, SOLUTION INTRAVENOUS at 19:23

## 2017-07-20 NOTE — PROGRESS NOTES
Problem: Pressure Injury - Risk of  Goal: *Prevention of pressure ulcer  Outcome: Progressing Towards Goal  Patient frequently refuses to be repositioned by turn team

## 2017-07-20 NOTE — PROGRESS NOTES
Problem: Surgical Pathway Post-Op Day 2 through Discharge  Goal: *Hemodynamically stable  Outcome: Progressing Towards Goal  Patient with stable vitals throughout shift. Goal: *Demonstrates progressive activity  Outcome: Progressing Towards Goal  Patient in recliner most of the day today.  Patient walked the halls with PT.

## 2017-07-20 NOTE — PROGRESS NOTES
Problem: Falls - Risk of  Goal: *Absence of falls  Outcome: Progressing Towards Goal  Patient will not fall during hospitalization. Patient encouraged to call for needed assistance. Patient ambulated with x 1 assist. Bed in lowest locked position. Family at bedside. Call bell within reach. Will continue to monitor. Problem: Pressure Injury - Risk of  Goal: *Prevention of pressure ulcer  Outcome: Progressing Towards Goal  Patient encouraged to change position frequently to alleviate pressure. Patient refusing to change position this evening. Patient ambulatory with assist. Skin remains warm, dry and intact. Skin assessed throughout shift for changes. Patient on bariatric bed that redistributes weight.

## 2017-07-20 NOTE — PROGRESS NOTES
Bedside shift change report given to Annette Landa RN (oncoming nurse) by Jesse Lee RN (offgoing nurse). Report included the following information SBAR, Kardex and Recent Results.

## 2017-07-20 NOTE — PROGRESS NOTES
General Surgery Daily Progress Note    Admit Date: 2017  Post-Operative Day: 13 Days Post-Op from  PROCEDURES PERFORMED:   1. Exploratory laparotomy with lysis of adhesions for 2 hours. 2. Fistula exclusion with feeding tube placement. 3. Wound vacuum-assisted closure placement  For Enterocutaneous fistula with frozen abdomen and abdominal wound infection by Dr. Luz Elena Davila on 2017. Subjective:     Last 24 hrs: Patient doing well this morning although still sleepy. States she rested pretty well overnight. As she was periodically awakened by nursing throughout night, she wound press her PCA and this managed her pain & allowed her to sleep more overnight. No c/o pain at rest this morning. With , right mid-abdominal pain. No NVD. Denies fevers or chills. No CP or SOB. Enjoying sips of iced apple juice for pleasure. Denies dysuria. Had PT yesterday & spent most of day up in chair which tired her. Hgb & Hct 7.5 & 24.2 this morning. Due for wound vac change tomorrow/Friday. Objective:     Blood pressure 112/58, pulse 99, temperature 98.5 °F (36.9 °C), resp. rate 16, height 5' 4\" (1.626 m), weight 326 lb 4.5 oz (148 kg), last menstrual period 2017, SpO2 100 %, not currently breastfeeding. Temp (24hrs), Av.3 °F (36.8 °C), Min:97.8 °F (36.6 °C), Max:98.5 °F (36.9 °C)      _____________________  Physical Exam:     Alert and Oriented, talkative & cooperative, in no acute distress. Cardiovascular: RRR  Lungs:CTAB, no resp distress, no wheezes  Abdomen: soft, round, wound vac & drains in place. SS drainage without ana blood noted in right drain tubing. Left side drain with continued bilious output, LLQ ileostomy pink with minimal mucous noted in bag. Assessment:   Active Problems:    Abdominal pain (2017)      Small bowel ischemia ([address] (2017)      Overview: CT abd/pelvis 17      1. The stomach and proximal small bowel loops are distended.  There is a       loop of      small bowel in the midabdomen which is mildly dilated and does not       demonstrate      enhancement in the wall and there is suspicion of pneumatosis and this       leads to      a loop of small bowel which demonstrates thickening of the wall and       findings are      suspicious for ischemia. 2. There is extensive mesenteric edema and a small amount of ascites in       the      pelvis. 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 There is nonocclusive thrombus in the SMA. 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)            Plan:     Continue TPN & sips for pleasure. Continue antibiotics. Encourage OOB to chair. Encourage IS. Continue to hold Lovenox. Per Dietician, consider additional TPN to meet caloric & protein needs. Increase IVF to 75 ml/hr during day when off TPN. Further treatment per Dr. Bravo Maddox. Data Review:    Recent Labs      07/20/17   0450  07/19/17   0210  07/18/17   0400   WBC  8.5  9.5  10.2   HGB  7.5*  7.6*  8.2*   HCT  24.2*  24.2*  26.5*   PLT  338  353  377     Recent Labs      07/20/17   0450  07/19/17   0210  07/18/17   0400   NA  138  138  140   K  3.8  3.7  3.9   CL  107  106  109*   CO2  24  24  23   GLU  158*  116*  128*   BUN  24*  24*  25*   CREA  1.03*  1.02  1.05*   CA  8.3*  8.6  8.6   MG  2.0  1.9  1.9   PHOS  4.5  4.3  4.1     No results for input(s): AML, LPSE in the last 72 hours.         ______________________  Medications:    Current Facility-Administered Medications   Medication Dose Route Frequency    vancomycin trough 7/20/17 at 1300, draw prior to dose   Other ONCE    TPN ADULT - CENTRAL   IntraVENous TITRATE    albuterol (PROVENTIL VENTOLIN) nebulizer solution 2.5 mg  2.5 mg Inhalation Q4H PRN    octreotide (SANDOSTATIN) injection 50 mcg  50 mcg IntraVENous TID    vancomycin (VANCOCIN) 1250 mg in  ml infusion  1,250 mg IntraVENous Q24H    clopidogrel (PLAVIX) tablet 75 mg  75 mg Oral DAILY    sodium chloride (NS) flush 10 mL  10 mL InterCATHeter Q24H    sodium chloride (NS) flush 10 mL  10 mL InterCATHeter PRN    sodium chloride (NS) flush 10-40 mL  10-40 mL InterCATHeter Q8H    alteplase (CATHFLO) 1 mg in sterile water (preservative free) 1 mL injection  1 mg InterCATHeter PRN    bacitracin 500 unit/gram packet 1 Packet  1 Packet Topical PRN    0.9% sodium chloride infusion 250 mL  250 mL IntraVENous PRN    lactated Ringers infusion  50 mL/hr IntraVENous CONTINUOUS    fat emulsion 20% (LIPOSYN, INTRALIPID) infusion 500 mL  500 mL IntraVENous Q MON, WED & FRI    LORazepam (ATIVAN) injection 1 mg  1 mg IntraVENous Q8H    HYDROmorphone (PF) (DILAUDID) injection 1 mg  1 mg IntraVENous Q1H PRN    aspirin chewable tablet 81 mg  81 mg Oral DAILY    insulin regular (NOVOLIN R, HUMULIN R) injection   SubCUTAneous Q6H    morphine (PF)  mg/30 ml   IntraVENous CONTINUOUS    glucose chewable tablet 16 g  4 Tab Oral PRN    glucagon (GLUCAGEN) injection 1 mg  1 mg IntraMUSCular PRN    dextrose 10 % infusion 125-250 mL  125-250 mL IntraVENous PRN    pantoprazole (PROTONIX) 40 mg in sodium chloride 0.9 % 10 mL injection  40 mg IntraVENous DAILY    prochlorperazine (COMPAZINE) with saline injection 5 mg  5 mg IntraVENous Q6H PRN    anidulafungin (ERAXIS) 100 mg in 0.9% sodium chloride 130 mL IVPB  100 mg IntraVENous Q24H    ondansetron (ZOFRAN) injection 8 mg  8 mg IntraVENous Q6H PRN    sodium chloride (NS) flush 5-10 mL  5-10 mL IntraVENous Q8H    sodium chloride (NS) flush 5-10 mL  5-10 mL IntraVENous PRN    naloxone (NARCAN) injection 0.4 mg  0.4 mg IntraVENous PRN    diphenhydrAMINE (BENADRYL) injection 12.5 mg  12.5 mg IntraVENous Q4H PRN    piperacillin-tazobactam (ZOSYN) 3.375 g in 0.9% sodium chloride (MBP/ADV) 100 mL  3.375 g IntraVENous Q8H    Vancomycin ---Pharmacy to Dose   Other Rx Dosing/Monitoring       Mario Robledo PA-C  7/20/2017

## 2017-07-20 NOTE — PROGRESS NOTES
Pharmacist Note - Vancomycin Dosing  Therapy day 26  Indication: Surgical site infection / ischemic bowel S/P SMA stenting   Current regimen: 1250 mg IV Q24h    A Trough Level resulted at 12 mcg/mL which was obtained ~24 hrs post-dose. Goal trough: 10 - 15 mcg/mL       Plan: Continue current regimen. Pharmacy will continue to monitor this patient daily for changes in clinical status and renal function.

## 2017-07-20 NOTE — PROGRESS NOTES
ID Progress Note  2017    Subjective:     Seems to be having an ok day--walked some today    Objective:     Antibiotics:  1. Vancomycin  2. Zosyn   3. Eraxis      Vitals:   Visit Vitals    BP (!) 160/100 (BP 1 Location: Right arm, BP Patient Position: At rest)    Pulse (!) 112    Temp 98.3 °F (36.8 °C)    Resp 18    Ht 5' 4\" (1.626 m)    Wt 148 kg (326 lb 4.5 oz)    LMP 2017 (Approximate)    SpO2 98%    Breastfeeding No    BMI 56.01 kg/m2        Tmax:  Temp (24hrs), Av.4 °F (36.9 °C), Min:98.2 °F (36.8 °C), Max:98.7 °F (37.1 °C)      Exam:  Lungs:  clear to auscultation bilaterally  Heart:  regularly irregular rhythm  Abdomen:  abnormal findings:  tenderness moderate in the entire abdomen, hypoactive bowel sounds    Labs:      Recent Labs      17   0450  17   0210  17   0400   WBC  8.5  9.5  10.2   HGB  7.5*  7.6*  8.2*   PLT  338  353  377   BUN  24*  24*  25*   CREA  1.03*  1.02  1.05*       Cultures:     Lab Results   Component Value Date/Time    Specimen Description: URINE 2009 07:45 PM     Lab Results   Component Value Date/Time    Culture result: NO GROWTH ON SOLID MEDIA AT 4 DAYS 2017 12:17 PM    Culture result:  2017 12:17 PM     **METHICILLIN RESISTANT STAPHYLOCOCCUS AUREUS**  ISOLATED FROM [facility]IO BROTH ONLY      Culture result: NO GROWTH 5 DAYS 2017 02:35 PM       Radiology:     Line/Insert Date:           Assessment:     1. Ischemic gut with frozen abdomen  2. Leukocytosis--back up after surgery--trending back down overall--down to normal  3. Cultures negative to date    Objective:     1. Continue IV therapy  2. Follow up cultures and studies  3.  Work up fevers    Velvet Darden MD

## 2017-07-20 NOTE — PROGRESS NOTES
Bedside and Verbal shift change report given to [employer] (oncoming nurse) by VELMA Alcantar RN (offgoing nurse). Report given with SBAR, Kardex, Intake/Output, MAR and Recent Results.

## 2017-07-20 NOTE — PROGRESS NOTES
Music Therapy Assessment    Nitin Astorga [id number]  [id number]    1975  46 y.o.  female    Patient Telephone Number: [phone] (home)   Sabianist Affiliation: Non Scientology   Language: English   Extended Emergency Contact Information  Primary Emergency Contact: Misael Hayes  Address: [address] [place] [address] Phone: [phone]  Relation: Parent   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    Small bowel ischemia ([address] 06/28/2017    Superior mesenteric artery thrombosis [facility] 06/28/2017    Abdominal pain 06/25/2017    Perforated bowel ([facility] 06/06/2017    Right flank pain 08/22/2011    Crohn's disease ([facility] 08/15/2011    DVT (deep venous thrombosis) ([facility] 08/15/2011    Incarcerated ventral hernia 08/15/2011        Date: 7/20/2017       Mental Status:   [x] Alert [  ] Katharine Mary [  ]  Confused  [  ] Minimally responsive    Communication Status: [  ] Impaired Speech [  ] Nonverbal     Physical Status:   [  ] Oxygen in use  [  ] Hard of Hearing [  ] Vision Impaired  [  ] Ambulatory  [  ] Ambulatory with assistance [  ] Non-ambulatory -N/A    Music Preferences, Background: N/A: Please see Session Observations below. Clinical Problem addressed: N/A: Please see Session Observations below.     Goal(s) met in session: N/A: \"                                       \"  Physical/Pain management (Scale of 1-10):    Pre-session rating ___________    Post-session rating __________   [  ] Increased relaxation   [  ] Regulated breathing patterns  [  ] Decreased muscle tension   [  ] Minimized physical distress     Emotional/Psychological:  [  ] Increased self-expression   [  ] Decreased aggressive behavior   [  ] Decreased sadness   [  ] Discussed healthy coping skills     [  ] Improved mood    [  ] Decreased withdrawn behavior     Social:  [  ] Decreased feelings of isolation/loneliness [  ] Positive social interaction   [  ] Provided support and/or comfort for family/friends    Spiritual:  [  ] Spiritual support    [  ] Expressed peace  [  ] Expressed jalen    [  ] Discussed beliefs    Techniques Utilized (Check all that apply): N/A: Please see Session Observations below. [  ] Procedural support MT [  ] Music for relaxation [  ] Patient preferred music  [  ] Suzie analysis  [  ] Song choice  [  ] Music for validation  [  ] Entrainment  [  ] Progressive muscle relax. [  ] Guided visualization  [  ] Roel Homans  [  ] Patient instrument playing [  ] Ramond Donate writing  [  ] Yoav Schmitz along   [  ] Navdeep Rising  [  ] Sensory stimulation  [  ] Active Listening  [  ] Music for spiritual support [  ] Making of CDs as gifts    Session Observations:  F/u visit; This music therapist (MT) attempted to offer music therapy to the patient (pt) twice this afternoon. On the first occasion, the pt was speaking with one of her doctors, and on the second, the pt was with an Occupational Therapist. The MT didn't want to interrupt either of these visits. The MT was able to say hello to the pt from the doorway and let her know that this MT will continue to follow up as able. The pt expressed gratitude.     DEEDEE HarrellBC (Music Therapist-Board Certified)  Spiritual Care Department  Referral-based service

## 2017-07-20 NOTE — PROGRESS NOTES
Problem: Mobility Impaired (Adult and Pediatric)  Goal: *Acute Goals and Plan of Care (Insert Text)    Physical Therapy Goals  Revised 7/20/2017  1. Patient will move from supine to sit and sit to supine , scoot up and down and roll side to side in bed with independence within 7 day(s). 2. Patient will transfer from bed to chair and chair to bed with supervision/set-up using the least restrictive device within 7 day(s). 3. Patient will perform sit to stand with supervision/set-up within 7 day(s). 4. Patient will ambulate with supervision/set-up for 200 feet with the least restrictive device within 7 day(s). 5. Patient will ascend/descend 7 stairs with one handrail(s) with supervision/set-up within 7 day(s). Physical Therapy Goals  7/12/2017  1. Patient will move from supine to sit and sit to supine , scoot up and down and roll side to side in bed with independence within 7 day(s). 2. Patient will transfer from bed to chair and chair to bed with supervision/set-up using the least restrictive device within 7 day(s). 3. Patient will perform sit to stand with supervision/set-up within 7 day(s). 4. Patient will ambulate with supervision/set-up for 50 feet with the least restrictive device within 7 day(s). PHYSICAL THERAPY TREATMENT: WEEKLY REASSESSMENT  Patient: Kayleigh Downing (46 y.o. female)  Date: 7/20/2017  Diagnosis: N/V intractable post-opt pain  Abdominal pain  poor iv access  DEAD BOWEL  SMALL BOWEL OBSTRUCTION  central line placement  Abdominal pain <principal problem not specified>  Procedure(s) (LRB):  SPECIAL PROCEDURE OUTSIDE OF OR (N/A) 7 Days Post-Op  Precautions:  fall, contact 1      ASSESSMENT:  Pt received up to the chair having just finished meeting with palliative care MD. She was clearly emotionally fragile, see subjective. Initially she attempted to defer PT stating that she would amb tomorrow with with encouragement she agreed to amb today.  Noted ongoing, slightly intermittent dark output in tube to wall suction. Discussed with pt's nurse. Opted to keep pt on suction for this session. She warmed up with ex, see below in body of note. She requested to amb without the walker this session. She stood with stand by assist and amb a total of 60 feet, forward and back distance limited by length of suction line. A second person stood by for equipment management. Gait was stable without walker support. .   Patient's progression toward goals since last assessment: pt is making gains, goals were revised. Yesterday she met distance for amb goal. Disposition depending on progress. PLAN:  Goals have been updated based on progression since last assessment. Patient continues to benefit from skilled intervention to address the above impairments. Continue to follow the patient 5 times a week to address goals. Planned Interventions:  [X]              Bed Mobility Training             [ ]       Neuromuscular Re-Education  [X]              Transfer Training                   [ ]       Orthotic/Prosthetic Training  [X]              Gait Training                         [ ]       Modalities  [X]              Therapeutic Exercises           [ ]       Edema Management/Control  [X]              Therapeutic Activities            [X]       Patient and Family Training/Education  [ ]              Other (comment):  Discharge Recommendations: Rehab, Home Health and To Be Determined  Further Equipment Recommendations for Discharge: to be determined       SUBJECTIVE:   Patient stated I want my mom, when I asked pt what I could to to help her      OBJECTIVE DATA SUMMARY:   Chart checked, pt cleared by nursing.   Critical Behavior:  Neurologic State: alert  Orientation Level: Oriented X4  Cognition: Follows commands, Decreased attention/concentration        Strength:                decreased functional           Functional Mobility Training:  Bed Mobility:         not assessed           Transfers:  Sit to Stand: Stand-by asssistance  Stand to Sit: Stand-by asssistance                             Balance:  Sitting: Intact  Standing: Impaired  Standing - Static: Good  Standing - Dynamic : Good  Ambulation/Gait Training:  Distance (ft): 60 Feet (ft)  Assistive Device: Gait belt  Ambulation - Level of Assistance: Contact guard assistance        Gait Abnormalities: Antalgic;Decreased step clearance        Base of Support: Widened     Speed/Liz: Slow  Step Length: Left shortened;Right shortened                               Stairs:           Neuro Re-Education:     Therapeutic Exercises: Ankle pumps and LAQs 10 reps bilaterally.  Pt attempted sitting marching two reps bilaterally but reported too painful  Pain:  Pain Scale 1: Numeric (0 - 10)  Pain Intensity 1: 2              Activity Tolerance:   Pt in NAD  After treatment:   [X]  Patient left in no apparent distress sitting up in chair  [ ]  Patient left in no apparent distress in bed  [X]  Call bell left within reach  [X]  Nursing notified  [ ]  Caregiver present  [ ]  Bed alarm activated      COMMUNICATION/COLLABORATION:   The patients plan of care was discussed with: Registered Nurse     Humphrey Mcqueen   Time Calculation: 29 mins

## 2017-07-20 NOTE — PROGRESS NOTES
Patient discussed during IDR this am. She remains on TPN with sips of apple juice, PCA pump for pain management, wound vac and IV Abx. Patient also has drains in place and ileostomy with mucous drainage. Patient ambulating with PT and Palliative Care involved for pain management. CM noted PT recommending Rehab vs HHPT. CM will continue to follow for appropriate disposition when medically stable. Hossein Arreaga MA, RN, CRM.

## 2017-07-20 NOTE — PROGRESS NOTES
Patient refusing to be turned with the assist team. Patient states \"I'm good in this position\". Patient educated on the benefits of turning frequently. Patient verbalizes understanding of pressure alleviation measures. Will continue to monitor. Call bell within reach. Family at bedside.

## 2017-07-20 NOTE — PROGRESS NOTES
Patient continues to refuse to change laying position. Will continue to monitor. Call bell within reach.

## 2017-07-20 NOTE — PROGRESS NOTES
NUTRITION       Brief Note: Chart reviewed, discussed with RN, NP and team during interdisciplinary rounds. Pt remains on TPN. Plan is to resume LR @ 75 ml/hr x 10 hours while pt is not on TPN secondary to high drain output. PO intake is increased, but draining immediately. May need to consider increasing TPN to meet 100% of estimated needs (currently meeting 95%). See further recs from 7/19 prn. PO Intake vs Drain and Ostomy Output (7/19/17):  PO Intake: 1250 mL  Output (drains): 4885 mL  Output (ostomy): 250 mL  Net: 3885 mL out    TPN: 5%AA D20 @ 85 mL/hr x 1 hour    @ 170 mL/hr x 12 hours    @ 85 mL/hr x 1 hour    + MVI, thiamine (100 mg), 14 units insulin/L, zinc sulfate (10 mg 3x/week)    + 20% lipids, 500 mL 3x/week  -- providing a daily average of 2374 kcal, 111 g protein and 2210 mL    Future TPN + IVF: 2960 mL per day    Estimated Nutrition Needs:   Kcals/day: 1693 Kcals/day (7881-6777 kcal/day ([place] [address] Jeor x 1.2-1.3))  Protein: 110 g (2.0g/kg IBW)  Fluid:  (1  ml/kcal)     Based On: [place] [address] Jeor  Weight Used: Actual wt (143.4 kg (wt on admit))     RD to follow.     [address]

## 2017-07-20 NOTE — PROGRESS NOTES
Music Therapy Assessment    Johana Hobbs [id number]  [id number]    1975  46 y.o.  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    Abdominal pain 06/25/2017    Perforated bowel ([address] 06/06/2017    Right flank pain 08/22/2011    Crohn's disease ([address] 08/15/2011    DVT (deep venous thrombosis) ([facility] 08/15/2011    Incarcerated ventral hernia 08/15/2011        Date: 7/20/2017       Mental Status:   [  ] Alert [  ] Marvine Daljit [  ]  Confused  [  ] Minimally responsive    Communication Status: [  ] Impaired Speech [  ] Nonverbal     Physical Status:   [  ] Oxygen in use  [  ] Hard of Hearing [  ] Vision Impaired  [  ] Ambulatory  [  ] Ambulatory with assistance [  ] Non-ambulatory     Music Preferences, Background: Classical, Rock, Pop, Jazz, especially Jo[address] and [place]; The patient sang in the school choir, and took guitar and cello lessons.     Clinical Problem addressed: Support healthy coping. Goal(s) met in session:   Physical/Pain management (Scale of 1-10):    Pre-session rating: N/A: Please see Session Observations below. Post-session rating: N/A: Please see Session Observations below.   [  ] Increased relaxation   [  ] Regulated breathing patterns  [  ] Decreased muscle tension   [  ] Minimized physical distress     Emotional/Psychological:  [  ] Increased self-expression   [  ] Decreased aggressive behavior   [  ] Decreased sadness   [  ] Discussed healthy coping skills     [  ] Improved mood    [  ] Decreased withdrawn behavior     Social:  [  ] Decreased feelings of isolation/loneliness [x] Positive social interaction   [  ] Provided support and/or comfort for family/friends    Spiritual:  [  ] Spiritual support    [  ] Expressed peace  [  ] Expressed jalen    [  ] Discussed beliefs    Techniques Utilized (Check all that apply):   [  ] Procedural support MT [  ] Music for relaxation [  ] Patient preferred music  [x] Suzie analysis-discussed [  ] Song choice  [  ] Music for validation  [  ] Entrainment  [  ] Progressive muscle relax. [  ] Guided visualization  [  ] Glory Stair  [  ] Patient instrument playing [  ] Alex Lomeli writing  [x] Sing along-discussed [  ] Tresa Quinteros  [  ] Sensory stimulation  [x] Active Listening  [  ] Music for spiritual support [  ] Making of CDs as gifts    Session Observations:  F/u visit; The patient (pt) was observed to be comfortably lying in bed. Two nurses were in her room working with some of the pt's care equipment. This music therapist (MT) asked the pt about how she's been doing since the previous music therapy session. The pt shared an update about what she understands about her care plan, saying some things are still up in the air. She said she's trying to take it one day at a time. The MT provided active listening and words of validation and support. Then the MT showed the pt suzie sheets to an [employer], selected because Ashia Muniz is one of the pt's favorite singers, and for its lyrics. The MT suggested the pt could sing with the MT (for an emotional outlet) and could then engage in suzie analysis. The pt expressed interest in this and said she'd prefer to do this when there aren't as many people in the room. The MT expressed understanding and offered to follow up later in the day for a session, as able. The pt expressed gratitude for this. Will follow as able.     DEEDEE MayberryBC (Music Therapist-Board Certified)  Spiritual Care Department  Referral-based service

## 2017-07-20 NOTE — PROGRESS NOTES
Chart checked, and attempted to work with pt, due for 7 day note. Pt's nurse reported that she needed to work with pt at this time. PT will continue to follow and see as pt is available.  Thank you, Yunier Schmitt PT

## 2017-07-20 NOTE — PROGRESS NOTES
Palliative Medicine Consult  [place]: [phone] ([phone])    Patient Name: Mar Bumpers  YOB: 1975    Date of Initial Consult: 6/27/17  Reason for Consult: overwhelming symptoms  Requesting Provider: Meena Burns NP  Primary Care Physician: Alis Ramirez MD      SUMMARY:   Mar Bumpers is a 46 y.o. with a past history of Crohn's disease, anxiety, chronic pain, hx DVT,multiple (4) small bowel resections, s/p recent urgent dx lap, with lysis of adhesions, repair of suspected perf 6/7/17 , who was admitted on 6/25/2017 from home with a diagnosis of worsening abdominal pain, elevated WBC and MRSA wound infection. Initially consulted for pain management thought to be possible Crohn's flare. Initial CT on 6/25 w/out acute findings but repeat on 6/28 showing ischemic bowl. S/p ex lap but ischemic bowl unresectable, s/p SMA stent. Extubated on 6/30. Most recently s/p ex lap, KATHRINE, feeding tube, fistula exclusion and wound vac placement for EC fistula. Patient is followed chronically for pain management by Dr. Beau Lion at [address] [place] [address]. Home regimen for chronic abdominal pain is MS Contin 60 Q8 and MS IR 30 Q6 PRN. She also takes Xanax 1mg TID prn anxiety.  reviewed, opioids last prescribed on 5/19/17. PALLIATIVE DIAGNOSES:     1. Abdominal pain, acute on chronic   2. Anxiety, chronic   3. Nausea  4. Crohn's disease  5. Chronic constipation (at home on Relistor)  6. MRSA wound        PLAN:   1. Patient appears comfortable, reports pain at 6 or 7 / 10, not too far from her usual 5/10; pt used ~3-4 pca doses / hr for last few hours; no IV dilaudid in last 48h; she feels tired, attributes this to trouble sleeping in hospital; she appears fatigued, but converses easily and not falling asleep during interview  2. Continue Morphine PCA 4mg/h basal, 5mg every 10 min demand.  Seems to control the pain to some degree and w/out significant confusion or drowsiness thus far (this has been a problem before). Pt feels that Morphine works better than Dilaudid (at equivalent doses). 3. Cont prn Dilaudid 1mg IV every 1h prn pain that is not controlled by PCA- hortencia before any dressing changes  4. On scheduled ativan for anxiety. After discussing with pt, primary service, will back off on scheduled ativan dose, reassess tiredness  5. PRN antiemetics  6. Bowel regimen per Surgery team - has chronic constipation, now on PCA. 7. Psychosocial support- discussed with pt about her concerns today -- pt suffering from loss of function and identity. Again mentioned her feeling that people are judging her for her pain medication requirement. Both palliative care  Bhumika, music therapist Rowan, involved  8. Communicated plan of care with: Palliative IDT; have been in communication w/ Dr Emil Camacho. GOALS OF CARE / TREATMENT PREFERENCES:   [====Goals of Care====]  GOALS OF CARE:  Patient / health care proxy stated goals: pain control  Recovery from acute issues      TREATMENT PREFERENCES:   Code Status: Full Code    Advance Care Planning:  Advance Care Planning 6/26/2017   Patient's Healthcare Decision Maker is: Legal Next of Camilo 71   Primary Decision Maker Name Quinn Dominguez   Primary Decision Maker Phone Number [phone]   Primary Decision Maker Relationship to Patient Parent   Confirm Advance Directive None   Patient Would Like to Complete Advance Directive No       Other:    The palliative care team has discussed with patient / health care proxy about goals of care / treatment preferences for patient.  [====Goals of Care====]         HISTORY:     Reviewed vitals, I/O's, labs, imaging, MAR, notes.   1250 PO documented 7/19 on I/o, on abx / TPN, no BM, 4885 from drains  hgb 7.5, down from 3 d ago, wbc / plt wnl  Na wnl, cr 1.03  LFT on 7/8: ast / alt / t shirin ok, ap 120, albumin 1.5    MAR  Albuterol prn, dose 7/18  Benadryl 12.5mg IV q4h PRN, dose 7/18  Dilaudid 1mg IV q1h PRN, 1 dose 7/18, none since  Ativan 1mg IV q8h scheduled  Morphine pca, 4mg/hr basal, 5mg pca dose q10min, 4 hour lockout of 120mg  Octreotide starting 7/17  zofran IV PRN, 1 dose 7/18, 2 dose 7/19, 1 dose 7/20  Compazine IV 5mg PRN, 1 dose 7/19  Anidulafungin, zosyn, vanc  Asa, plavix  TPN    HPI/SUBJECTIVE:    The patient is:   [x] Verbal and participatory  [] Non-participatory due to: Medical condition     Pt reports abd pain 6 or 7 / 10, baseline is 5/10. No nausea sob. Ambulated with PT about 30 feet, she says mobility is not a problem. Feels tired / sleepy, see above, easily conversant. Clinical Pain Assessment (nonverbal scale for severity on nonverbal patients):   [++++ Clinical Pain Assessment++++]  [++++Pain Severity++++]: Pain: 6  [++++Pain Character++++]: sharp and burning, stabbing  [++++Pain Duration++++]: several days  [++++Pain Effect++++]:  feeling anxious  [++++Pain Factors++++]: better after pain medicaion  [++++Pain Frequency++++]: constant  [++++Pain Location++++]: across abdomen both sides in middle at incision site  [++++ Clinical Pain Assessment++++]     FUNCTIONAL ASSESSMENT:     Palliative Performance Scale (PPS):  PPS: 50       PSYCHOSOCIAL/SPIRITUAL SCREENING:     Advance Care Planning:  Advance Care Planning 6/26/2017   Patient's Healthcare Decision Maker is: Legal Next herberth Page 71   Primary Decision Maker Name Tee Christian   Primary Decision Maker Phone Number [phone]   Primary Decision Maker Relationship to Patient Parent   Confirm Advance Directive None   Patient Would Like to Complete Advance Directive No        Any spiritual / Lutheran concerns:  [] Yes /  [x] No    Caregiver Burnout:  [] Yes /  [x] No /  [] No Caregiver Present      Anticipatory grief assessment:   [x] Normal  / [] Maladaptive       ESAS Anxiety: Anxiety: 3    ESAS Depression: Depression: 2        REVIEW OF SYSTEMS:     Positive and pertinent negative findings in ROS are noted above in HPI.   The following systems were [x] reviewed / [] unable to be reviewed as noted in HPI  Other findings are noted below. Systems: constitutional, ears/nose/mouth/throat, respiratory, gastrointestinal, genitourinary, musculoskeletal, integumentary, neurologic, psychiatric, endocrine. Positive findings noted below. Modified ESAS Completed by: provider   Fatigue: 4 Drowsiness: 0   Depression: 2 Pain: 6   Anxiety: 3 Nausea: 0   Anorexia: 0 Dyspnea: 0     Constipation: No              PHYSICAL EXAM:     From RN flowsheet:  Wt Readings from Last 3 Encounters:   07/20/17 326 lb 4.5 oz (148 kg)   06/25/17 343 lb (155.6 kg)   06/22/17 343 lb (155.6 kg)     Blood pressure 107/54, pulse 98, temperature 98.2 °F (36.8 °C), resp. rate 18, height 5' 4\" (1.626 m), weight 326 lb 4.5 oz (148 kg), last menstrual period 05/21/2017, SpO2 99 %, not currently breastfeeding. Pain Scale 1: Numeric (0 - 10)  Pain Intensity 1: 2  Pain Onset 1: chronic  Pain Location 1: Abdomen  Pain Orientation 1: Anterior  Pain Description 1: Aching  Pain Intervention(s) 1: Encouraged PCA  Last bowel movement, if known: stool in ostomy     Constitutional:awake, alert, oriented, tearful at times   Eyes: pupils equal, anicteric  ENMT: no nasal discharge, moist mucous membranes  Cardiac: regular rhythm   Respiratory: breathing not labored  Gastrointestinal: midline incision w/ wound vac, b/l wounds, hypoactive bowel sounds   Musculoskeletal: no deformity, no tenderness to palpation  Skin: warm, dry, no edema  Neurologic: moving all extremities  Psych: no agitation, restricted affect, no hallucinations     HISTORY:     Active Problems:    Abdominal pain (6/25/2017)      Small bowel ischemia (HCC) (6/28/2017)      Overview: CT abd/pelvis 6/28/17      1. The stomach and proximal small bowel loops are distended.  There is a       loop of      small bowel in the midabdomen which is mildly dilated and does not       demonstrate      enhancement in the wall and there is suspicion of pneumatosis and this       leads to      a loop of small bowel which demonstrates thickening of the wall and       findings are      suspicious for ischemia. 2. There is extensive mesenteric edema and a small amount of ascites in       the      pelvis. Superior mesenteric artery thrombosis ([facility] [address] (6/28/2017)      Overview: CT abd/pelvis 6/28/17:      3. There is nonocclusive thrombus in the SMA. 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)      Past Medical History:   Diagnosis Date    Crohn's disease ([facility] [address] 8/15/2011    DVT (deep venous thrombosis) ([facility] [address] 8/15/2011    Incarcerated ventral hernia 8/15/2011    Right flank pain 8/22/2011    Seizures ([facility] [address]     Stroke [facility])       Past Surgical History:   Procedure Laterality Date    HX OTHER SURGICAL      multiple procedures related to her Crohn's disease    CT LAP, INCISIONAL HERNIA REPAIR,INCARCERATED  9-10-08    dr. Chilo Dobbins      Family History   Problem Relation Age of Onset    Hypertension Father     Stroke Father     Other Father      arthritis      History reviewed, no pertinent family history.   Social History   Substance Use Topics    Smoking status: Former Smoker    Smokeless tobacco: Not on file    Alcohol use No     Allergies   Allergen Reactions    Demerol [Meperidine] Unknown (comments)    Soma [Carisoprodol] Rash and Nausea Only    Sulfa (Sulfonamide Antibiotics) Unknown (comments)    Toradol [Ketorolac Tromethamine] Unknown (comments)      Current Facility-Administered Medications   Medication Dose Route Frequency    TPN ADULT - CENTRAL   IntraVENous TITRATE    albuterol (PROVENTIL VENTOLIN) nebulizer solution 2.5 mg  2.5 mg Inhalation Q4H PRN    octreotide (SANDOSTATIN) injection 50 mcg  50 mcg IntraVENous TID    vancomycin (VANCOCIN) 1250 mg in  ml infusion  1,250 mg IntraVENous Q24H    clopidogrel (PLAVIX) tablet 75 mg  75 mg Oral DAILY    sodium chloride (NS) flush 10 mL  10 mL InterCATHeter Q24H    sodium chloride (NS) flush 10 mL  10 mL InterCATHeter PRN    sodium chloride (NS) flush 10-40 mL  10-40 mL InterCATHeter Q8H    alteplase (CATHFLO) 1 mg in sterile water (preservative free) 1 mL injection  1 mg InterCATHeter PRN    bacitracin 500 unit/gram packet 1 Packet  1 Packet Topical PRN    0.9% sodium chloride infusion 250 mL  250 mL IntraVENous PRN    lactated Ringers infusion  75 mL/hr IntraVENous CONTINUOUS    fat emulsion 20% (LIPOSYN, INTRALIPID) infusion 500 mL  500 mL IntraVENous Q MON, WED & FRI    LORazepam (ATIVAN) injection 1 mg  1 mg IntraVENous Q8H    HYDROmorphone (PF) (DILAUDID) injection 1 mg  1 mg IntraVENous Q1H PRN    aspirin chewable tablet 81 mg  81 mg Oral DAILY    insulin regular (NOVOLIN R, HUMULIN R) injection   SubCUTAneous Q6H    morphine (PF)  mg/30 ml   IntraVENous CONTINUOUS    glucose chewable tablet 16 g  4 Tab Oral PRN    glucagon (GLUCAGEN) injection 1 mg  1 mg IntraMUSCular PRN    dextrose 10 % infusion 125-250 mL  125-250 mL IntraVENous PRN    pantoprazole (PROTONIX) 40 mg in sodium chloride 0.9 % 10 mL injection  40 mg IntraVENous DAILY    prochlorperazine (COMPAZINE) with saline injection 5 mg  5 mg IntraVENous Q6H PRN    anidulafungin (ERAXIS) 100 mg in 0.9% sodium chloride 130 mL IVPB  100 mg IntraVENous Q24H    ondansetron (ZOFRAN) injection 8 mg  8 mg IntraVENous Q6H PRN    sodium chloride (NS) flush 5-10 mL  5-10 mL IntraVENous Q8H    sodium chloride (NS) flush 5-10 mL  5-10 mL IntraVENous PRN    naloxone (NARCAN) injection 0.4 mg  0.4 mg IntraVENous PRN    diphenhydrAMINE (BENADRYL) injection 12.5 mg  12.5 mg IntraVENous Q4H PRN    piperacillin-tazobactam (ZOSYN) 3.375 g in 0.9% sodium chloride (MBP/ADV) 100 mL  3.375 g IntraVENous Q8H    Vancomycin ---Pharmacy to Dose   Other Rx Dosing/Monitoring          LAB AND IMAGING FINDINGS:     Lab Results   Component Value Date/Time    WBC 8.5 07/20/2017 04:50 AM    HGB 7.5 07/20/2017 04:50 AM    PLATELET 771 83/88/1917 04:50 AM     Lab Results   Component Value Date/Time    Sodium 138 07/20/2017 04:50 AM    Potassium 3.8 07/20/2017 04:50 AM    Chloride 107 07/20/2017 04:50 AM    CO2 24 07/20/2017 04:50 AM    BUN 24 07/20/2017 04:50 AM    Creatinine 1.03 07/20/2017 04:50 AM    Calcium 8.3 07/20/2017 04:50 AM    Magnesium 2.0 07/20/2017 04:50 AM    Phosphorus 4.5 07/20/2017 04:50 AM      Lab Results   Component Value Date/Time    AST (SGOT) 11 07/08/2017 04:42 AM    Alk. phosphatase 120 07/08/2017 04:42 AM    Protein, total 7.0 07/08/2017 04:42 AM    Albumin 1.5 07/08/2017 04:42 AM    Globulin 5.5 07/08/2017 04:42 AM     No results found for: INR, PTMR, PTP, PT1, PT2, APTT   No results found for: IRON, FE, TIBC, IBCT, PSAT, FERR   No results found for: PH, PCO2, PO2  No components found for: GLPOC   No results found for: CPK, CKMB             Total time:  25min  Counseling / coordination time, spent as noted above:  15min  > 50% counseling / coordination?: y    Prolonged service was provided for  []30 min   []75 min in face to face time in the presence of the patient, spent as noted above. Time Start:   Time End:   Note: this can only be billed with 17941 (initial) or 28596 (follow up). If multiple start / stop times, list each separately.

## 2017-07-21 LAB
ANION GAP BLD CALC-SCNC: 8 MMOL/L (ref 5–15)
BASOPHILS # BLD AUTO: 0 K/UL (ref 0–0.1)
BASOPHILS # BLD: 0 % (ref 0–1)
BUN SERPL-MCNC: 24 MG/DL (ref 6–20)
BUN/CREAT SERPL: 23 (ref 12–20)
CALCIUM SERPL-MCNC: 8.6 MG/DL (ref 8.5–10.1)
CHLORIDE SERPL-SCNC: 107 MMOL/L (ref 97–108)
CO2 SERPL-SCNC: 24 MMOL/L (ref 21–32)
CREAT SERPL-MCNC: 1.04 MG/DL (ref 0.55–1.02)
EOSINOPHIL # BLD: 0.6 K/UL (ref 0–0.4)
EOSINOPHIL NFR BLD: 6 % (ref 0–7)
ERYTHROCYTE [DISTWIDTH] IN BLOOD BY AUTOMATED COUNT: 16.2 % (ref 11.5–14.5)
GLUCOSE BLD STRIP.AUTO-MCNC: 131 MG/DL (ref 65–100)
GLUCOSE BLD STRIP.AUTO-MCNC: 163 MG/DL (ref 65–100)
GLUCOSE BLD STRIP.AUTO-MCNC: 247 MG/DL (ref 65–100)
GLUCOSE BLD STRIP.AUTO-MCNC: 93 MG/DL (ref 65–100)
GLUCOSE SERPL-MCNC: 119 MG/DL (ref 65–100)
HCT VFR BLD AUTO: 23.8 % (ref 35–47)
HGB BLD-MCNC: 7.6 G/DL (ref 11.5–16)
LYMPHOCYTES # BLD AUTO: 39 % (ref 12–49)
LYMPHOCYTES # BLD: 3.6 K/UL (ref 0.8–3.5)
MAGNESIUM SERPL-MCNC: 1.7 MG/DL (ref 1.6–2.4)
MCH RBC QN AUTO: 28.9 PG (ref 26–34)
MCHC RBC AUTO-ENTMCNC: 31.9 G/DL (ref 30–36.5)
MCV RBC AUTO: 90.5 FL (ref 80–99)
MONOCYTES # BLD: 1.2 K/UL (ref 0–1)
MONOCYTES NFR BLD AUTO: 13 % (ref 5–13)
NEUTS SEG # BLD: 3.9 K/UL (ref 1.8–8)
NEUTS SEG NFR BLD AUTO: 42 % (ref 32–75)
PHOSPHATE SERPL-MCNC: 4.4 MG/DL (ref 2.6–4.7)
PLATELET # BLD AUTO: 342 K/UL (ref 150–400)
POTASSIUM SERPL-SCNC: 4 MMOL/L (ref 3.5–5.1)
RBC # BLD AUTO: 2.63 M/UL (ref 3.8–5.2)
SERVICE CMNT-IMP: ABNORMAL
SERVICE CMNT-IMP: NORMAL
SODIUM SERPL-SCNC: 139 MMOL/L (ref 136–145)
WBC # BLD AUTO: 9.2 K/UL (ref 3.6–11)

## 2017-07-21 PROCEDURE — 74011250636 HC RX REV CODE- 250/636: Performed by: PHYSICIAN ASSISTANT

## 2017-07-21 PROCEDURE — 3331090002 HH PPS REVENUE DEBIT

## 2017-07-21 PROCEDURE — 74011250636 HC RX REV CODE- 250/636: Performed by: PHYSICAL MEDICINE & REHABILITATION

## 2017-07-21 PROCEDURE — 74011636637 HC RX REV CODE- 636/637: Performed by: PHYSICIAN ASSISTANT

## 2017-07-21 PROCEDURE — 97110 THERAPEUTIC EXERCISES: CPT | Performed by: PHYSICAL THERAPIST

## 2017-07-21 PROCEDURE — 84100 ASSAY OF PHOSPHORUS: CPT | Performed by: SURGERY

## 2017-07-21 PROCEDURE — 97606 NEG PRS WND THER DME>50 SQCM: CPT

## 2017-07-21 PROCEDURE — 74011250636 HC RX REV CODE- 250/636: Performed by: NURSE PRACTITIONER

## 2017-07-21 PROCEDURE — 3331090001 HH PPS REVENUE CREDIT

## 2017-07-21 PROCEDURE — 74011250637 HC RX REV CODE- 250/637: Performed by: SURGERY

## 2017-07-21 PROCEDURE — 85025 COMPLETE CBC W/AUTO DIFF WBC: CPT | Performed by: SURGERY

## 2017-07-21 PROCEDURE — 36415 COLL VENOUS BLD VENIPUNCTURE: CPT | Performed by: SURGERY

## 2017-07-21 PROCEDURE — C9113 INJ PANTOPRAZOLE SODIUM, VIA: HCPCS | Performed by: SURGERY

## 2017-07-21 PROCEDURE — 65660000000 HC RM CCU STEPDOWN

## 2017-07-21 PROCEDURE — 74011000250 HC RX REV CODE- 250: Performed by: NURSE PRACTITIONER

## 2017-07-21 PROCEDURE — 74011636637 HC RX REV CODE- 636/637: Performed by: SURGERY

## 2017-07-21 PROCEDURE — 77030018717 HC DRSG GRNUFM KCON -B

## 2017-07-21 PROCEDURE — 74011000258 HC RX REV CODE- 258: Performed by: INTERNAL MEDICINE

## 2017-07-21 PROCEDURE — 74011250636 HC RX REV CODE- 250/636: Performed by: INTERNAL MEDICINE

## 2017-07-21 PROCEDURE — 74011000250 HC RX REV CODE- 250: Performed by: SURGERY

## 2017-07-21 PROCEDURE — 74011250636 HC RX REV CODE- 250/636: Performed by: SURGERY

## 2017-07-21 PROCEDURE — 74011000250 HC RX REV CODE- 250: Performed by: PHYSICIAN ASSISTANT

## 2017-07-21 PROCEDURE — 74011000258 HC RX REV CODE- 258: Performed by: PHYSICIAN ASSISTANT

## 2017-07-21 PROCEDURE — 80048 BASIC METABOLIC PNL TOTAL CA: CPT | Performed by: SURGERY

## 2017-07-21 PROCEDURE — 83735 ASSAY OF MAGNESIUM: CPT | Performed by: SURGERY

## 2017-07-21 PROCEDURE — 82962 GLUCOSE BLOOD TEST: CPT

## 2017-07-21 PROCEDURE — 74011250636 HC RX REV CODE- 250/636: Performed by: EMERGENCY MEDICINE

## 2017-07-21 PROCEDURE — 74011000258 HC RX REV CODE- 258: Performed by: SURGERY

## 2017-07-21 RX ORDER — ACETAMINOPHEN 325 MG/1
650 TABLET ORAL
Status: DISCONTINUED | OUTPATIENT
Start: 2017-07-21 | End: 2017-08-24 | Stop reason: HOSPADM

## 2017-07-21 RX ADMIN — OCTREOTIDE ACETATE 50 MCG: 50 INJECTION, SOLUTION INTRAVENOUS; SUBCUTANEOUS at 10:27

## 2017-07-21 RX ADMIN — Medication 10 ML: at 10:28

## 2017-07-21 RX ADMIN — VANCOMYCIN HYDROCHLORIDE 1250 MG: 10 INJECTION, POWDER, LYOPHILIZED, FOR SOLUTION INTRAVENOUS at 13:26

## 2017-07-21 RX ADMIN — HYDROMORPHONE HYDROCHLORIDE 1 MG: 1 INJECTION, SOLUTION INTRAMUSCULAR; INTRAVENOUS; SUBCUTANEOUS at 16:00

## 2017-07-21 RX ADMIN — HYDROMORPHONE HYDROCHLORIDE 1 MG: 1 INJECTION, SOLUTION INTRAMUSCULAR; INTRAVENOUS; SUBCUTANEOUS at 11:04

## 2017-07-21 RX ADMIN — Medication 10 ML: at 13:32

## 2017-07-21 RX ADMIN — Medication 10 ML: at 22:22

## 2017-07-21 RX ADMIN — ONDANSETRON 8 MG: 2 INJECTION INTRAMUSCULAR; INTRAVENOUS at 22:42

## 2017-07-21 RX ADMIN — OCTREOTIDE ACETATE 50 MCG: 50 INJECTION, SOLUTION INTRAVENOUS; SUBCUTANEOUS at 15:49

## 2017-07-21 RX ADMIN — ONDANSETRON 8 MG: 2 INJECTION INTRAMUSCULAR; INTRAVENOUS at 15:49

## 2017-07-21 RX ADMIN — Medication 0.76 MG: at 22:21

## 2017-07-21 RX ADMIN — SODIUM CHLORIDE, SODIUM LACTATE, POTASSIUM CHLORIDE, AND CALCIUM CHLORIDE 75 ML/HR: 600; 310; 30; 20 INJECTION, SOLUTION INTRAVENOUS at 00:42

## 2017-07-21 RX ADMIN — PIPERACILLIN SODIUM,TAZOBACTAM SODIUM 3.38 G: 3; .375 INJECTION, POWDER, FOR SOLUTION INTRAVENOUS at 22:22

## 2017-07-21 RX ADMIN — Medication: at 18:14

## 2017-07-21 RX ADMIN — SODIUM CHLORIDE 100 MG: 900 INJECTION, SOLUTION INTRAVENOUS at 17:49

## 2017-07-21 RX ADMIN — HYDROMORPHONE HYDROCHLORIDE 1 MG: 1 INJECTION, SOLUTION INTRAMUSCULAR; INTRAVENOUS; SUBCUTANEOUS at 20:54

## 2017-07-21 RX ADMIN — CLOPIDOGREL BISULFATE 75 MG: 75 TABLET ORAL at 10:27

## 2017-07-21 RX ADMIN — ASPIRIN 81 MG CHEWABLE TABLET 81 MG: 81 TABLET CHEWABLE at 10:27

## 2017-07-21 RX ADMIN — I.V. FAT EMULSION 500 ML: 20 EMULSION INTRAVENOUS at 18:20

## 2017-07-21 RX ADMIN — PIPERACILLIN SODIUM,TAZOBACTAM SODIUM 3.38 G: 3; .375 INJECTION, POWDER, FOR SOLUTION INTRAVENOUS at 13:26

## 2017-07-21 RX ADMIN — OCTREOTIDE ACETATE 50 MCG: 50 INJECTION, SOLUTION INTRAVENOUS; SUBCUTANEOUS at 22:22

## 2017-07-21 RX ADMIN — CALCIUM GLUCONATE: 94 INJECTION, SOLUTION INTRAVENOUS at 18:21

## 2017-07-21 RX ADMIN — SODIUM CHLORIDE 40 MG: 9 INJECTION INTRAMUSCULAR; INTRAVENOUS; SUBCUTANEOUS at 10:28

## 2017-07-21 RX ADMIN — Medication 0.76 MG: at 13:27

## 2017-07-21 RX ADMIN — HYDROMORPHONE HYDROCHLORIDE 1 MG: 1 INJECTION, SOLUTION INTRAMUSCULAR; INTRAVENOUS; SUBCUTANEOUS at 17:49

## 2017-07-21 RX ADMIN — PIPERACILLIN SODIUM,TAZOBACTAM SODIUM 3.38 G: 3; .375 INJECTION, POWDER, FOR SOLUTION INTRAVENOUS at 06:26

## 2017-07-21 RX ADMIN — HUMAN INSULIN 3 UNITS: 100 INJECTION, SOLUTION SUBCUTANEOUS at 06:25

## 2017-07-21 RX ADMIN — Medication 10 ML: at 06:00

## 2017-07-21 RX ADMIN — Medication: at 01:19

## 2017-07-21 RX ADMIN — ONDANSETRON 8 MG: 2 INJECTION INTRAMUSCULAR; INTRAVENOUS at 10:28

## 2017-07-21 RX ADMIN — Medication 10 ML: at 17:49

## 2017-07-21 RX ADMIN — ACETAMINOPHEN 650 MG: 325 TABLET, FILM COATED ORAL at 20:46

## 2017-07-21 RX ADMIN — Medication 10 ML: at 22:23

## 2017-07-21 RX ADMIN — Medication: at 10:44

## 2017-07-21 RX ADMIN — Medication 10 ML: at 15:49

## 2017-07-21 RX ADMIN — Medication 0.76 MG: at 06:26

## 2017-07-21 NOTE — PROGRESS NOTES
Bedside shift change report given to Nadine Mac (oncoming nurse) by Sal Cordero (offgoing nurse).  Report included the following information SBAR, Kardex, OR Summary, Procedure Summary, Intake/Output, MAR, Recent Results, Med Rec Status and Cardiac Rhythm SR-ST.

## 2017-07-21 NOTE — PROGRESS NOTES
Bedside shift change report given to MAURI Harkins by Christen Medel RN. Report included the following information SBAR, Kardex, OR Summary, Intake/Output, MAR and Cardiac Rhythm NSR.

## 2017-07-21 NOTE — PROGRESS NOTES
[facility] General Surgery    POD #14    Subjective     No acute events, tolerating sips well, TPN, pain controlled    Objective     Patient Vitals for the past 24 hrs:   Temp Pulse Resp BP SpO2   07/21/17 0835 98.3 °F (36.8 °C) 100 18 103/67 98 %   07/21/17 0400 98.8 °F (37.1 °C) (!) 108 17 108/50 -   07/20/17 2321 98.6 °F (37 °C) 98 16 110/43 97 %   07/20/17 1911 98.4 °F (36.9 °C) 98 18 123/56 99 %   07/20/17 1606 98.3 °F (36.8 °C) (!) 112 18 (!) 160/100 98 %   07/20/17 1228 98.2 °F (36.8 °C) 98 18 107/54 99 %         Date 07/20/17 0700 - 07/21/17 0659 07/21/17 0700 - 07/22/17 0659   Shift 7669-0090 4488-4678 24 Hour Total 8222-3321 9544-4472 24 Hour Total   I  N  T  A  K  E   I.V.  (mL/kg/hr) 1303.9  (0.7) 1067.1  (0.6) 2371  (0.7) 3251.3  3251.3      Volume (lactated Ringers infusion) 700.4 550 1250.4 637.5  637.5      Volume (vancomycin (VANCOCIN) 1250 mg in  ml infusion) 250  250         Volume (piperacillin-tazobactam (ZOSYN) 3.375 g in 0.9% sodium chloride (MBP/ADV) 100 mL) 200  200 200  200      Volume (fat emulsion 20% (LIPOSYN, INTRALIPID) infusion 500 mL) 153.5  153.5 968.8  968.8      Volume (TPN ADULT - CENTRAL)  517.1 517.1 1445  1445    Shift Total  (mL/kg) 1303.9  (8.8) 1067.1  (7.3) 2371  (16.1) 3251.3  (22.1)  3251.3  (22.1)   O  U  T  P  U  T   Urine  (mL/kg/hr) 1800  (1) 1050  (0.6) 2850  (0.8)         Urine Voided 950  950         Urine Output (mL) (External Female Catheter 07/12/17) 850 1050 1900       Drains 2320 1400 3720 200  200      Output (ml) (Vacuum Assisted Closure Anterior;Mid Abdominal)  0 0 0  0      Output (ml) ([place] Drain (Abdominal Drainage) 07/07/17) 7576 534 0385 200  200      Output (ml) ([place] Drain (Abdominal Drainage) 07/07/17) 70 0 70         Output (ml) Orly Berny Drain 07/07/17 Right Abdomen)  0  0    Stool 0 0 0 0  0      Output (ml) (Ileostomy 06/07/00 Lower  Abdomen) 0 0 0 0  0    Shift Total  (mL/kg) 4120  (27.8) 2450  (16.7) 6570  (44.7) 200  (1.4)  200  (1.4)   NET -2816.1 -1382.9 -4199 3051. 3  3051. 3   Weight (kg) 148 147 147 147 147 147       PE  Pulm - CTAB  CV - RRR  Abd - soft, ND, BS present, WV intact and drains in place    Labs  Recent Results (from the past 12 hour(s))   GLUCOSE, POC    Collection Time: 07/20/17 11:24 PM   Result Value Ref Range    Glucose (POC) 144 (H) 65 - 100 mg/dL    Performed by YI DAIGLE    CBC WITH AUTOMATED DIFF    Collection Time: 07/21/17  4:09 AM   Result Value Ref Range    WBC 9.2 3.6 - 11.0 K/uL    RBC 2.63 (L) 3.80 - 5.20 M/uL    HGB 7.6 (L) 11.5 - 16.0 g/dL    HCT 23.8 (L) 35.0 - 47.0 %    MCV 90.5 80.0 - 99.0 FL    MCH 28.9 26.0 - 34.0 PG    MCHC 31.9 30.0 - 36.5 g/dL    RDW 16.2 (H) 11.5 - 14.5 %    PLATELET 012 270 - 448 K/uL    NEUTROPHILS 42 32 - 75 %    LYMPHOCYTES 39 12 - 49 %    MONOCYTES 13 5 - 13 %    EOSINOPHILS 6 0 - 7 %    BASOPHILS 0 0 - 1 %    ABS. NEUTROPHILS 3.9 1.8 - 8.0 K/UL    ABS. LYMPHOCYTES 3.6 (H) 0.8 - 3.5 K/UL    ABS. MONOCYTES 1.2 (H) 0.0 - 1.0 K/UL    ABS. EOSINOPHILS 0.6 (H) 0.0 - 0.4 K/UL    ABS.  BASOPHILS 0.0 0.0 - 0.1 K/UL   METABOLIC PANEL, BASIC    Collection Time: 07/21/17  4:09 AM   Result Value Ref Range    Sodium 139 136 - 145 mmol/L    Potassium 4.0 3.5 - 5.1 mmol/L    Chloride 107 97 - 108 mmol/L    CO2 24 21 - 32 mmol/L    Anion gap 8 5 - 15 mmol/L    Glucose 119 (H) 65 - 100 mg/dL    BUN 24 (H) 6 - 20 MG/DL    Creatinine 1.04 (H) 0.55 - 1.02 MG/DL    BUN/Creatinine ratio 23 (H) 12 - 20      GFR est AA >60 >60 ml/min/1.73m2    GFR est non-AA 59 (L) >60 ml/min/1.73m2    Calcium 8.6 8.5 - 10.1 MG/DL   MAGNESIUM    Collection Time: 07/21/17  4:09 AM   Result Value Ref Range    Magnesium 1.7 1.6 - 2.4 mg/dL   PHOSPHORUS    Collection Time: 07/21/17  4:09 AM   Result Value Ref Range    Phosphorus 4.4 2.6 - 4.7 MG/DL   GLUCOSE, POC    Collection Time: 07/21/17  6:02 AM   Result Value Ref Range    Glucose (POC) 247 (H) 65 - 100 mg/dL    Performed by Swati Mccoy is a 46 y. o.yr old female with EC fistula, frozen abdomen    Plan     Continue TPN & sips for pleasure. Continue antibiotics. Encourage OOB to chair. Encourage IS. Continue to hold Lovenox. Per Dietician, consider additional TPN to meet caloric & protein needs.       Noris Antoine MD

## 2017-07-21 NOTE — PROGRESS NOTES
Problem: Mobility Impaired (Adult and Pediatric)  Goal: *Acute Goals and Plan of Care (Insert Text)    Physical Therapy Goals  Revised 7/20/2017  1. Patient will move from supine to sit and sit to supine , scoot up and down and roll side to side in bed with independence within 7 day(s). 2. Patient will transfer from bed to chair and chair to bed with supervision/set-up using the least restrictive device within 7 day(s). 3. Patient will perform sit to stand with supervision/set-up within 7 day(s). 4. Patient will ambulate with supervision/set-up for 200 feet with the least restrictive device within 7 day(s). 5. Patient will ascend/descend 7 stairs with one handrail(s) with supervision/set-up within 7 day(s). Physical Therapy Goals  7/12/2017  1. Patient will move from supine to sit and sit to supine , scoot up and down and roll side to side in bed with independence within 7 day(s). 2. Patient will transfer from bed to chair and chair to bed with supervision/set-up using the least restrictive device within 7 day(s). 3. Patient will perform sit to stand with supervision/set-up within 7 day(s). 4. Patient will ambulate with supervision/set-up for 50 feet with the least restrictive device within 7 day(s). PHYSICAL THERAPY TREATMENT  Patient: Era Zavala (46 y.o. female)  Date: 7/21/2017  Diagnosis: N/V intractable post-opt pain  Abdominal pain  poor iv access  DEAD BOWEL  SMALL BOWEL OBSTRUCTION  central line placement  Abdominal pain <principal problem not specified>  Procedure(s) (LRB):  SPECIAL PROCEDURE OUTSIDE OF OR (N/A) 8 Days Post-Op  Precautions:        ASSESSMENT:  Patient making good progress toward goals. Reports having a \"bad day today\" and is agreeable to exercises only. Instructed in seated exercises with good return demo. Recommend HH PT at discharge. Will continue to follow.   Progression toward goals:  [ ]    Improving appropriately and progressing toward goals  [X] Improving slowly and progressing toward goals  [ ]    Not making progress toward goals and plan of care will be adjusted       PLAN:  Patient continues to benefit from skilled intervention to address the above impairments. Continue treatment per established plan of care. Discharge Recommendations:  Home Health  Further Equipment Recommendations for Discharge:  TBD       SUBJECTIVE:   Patient stated I'm just having a really bad day today.       OBJECTIVE DATA SUMMARY:   Critical Behavior:  Neurologic State: Alert, Appropriate for age, Eyes open to stimulus, Eyes open to pain, Eyes open spontaneously, Eyes open to voice  Orientation Level: Oriented X4  Cognition: Appropriate decision making, Appropriate for age attention/concentration, Appropriate safety awareness, Follows commands     Functional Mobility Training:  Bed Mobility:      not tested, up in chair at PT arrival              Transfers:  Sit to Stand: Stand-by asssistance  Stand to Sit: Stand-by asssistance                             Balance:  Sitting: Intact  Standing: Intact  Ambulation/Gait Training:           Pain:  Pain Scale 1: Numeric (0 - 10)  Pain Intensity 1: 8  Pain Location 1: Abdomen  Pain Orientation 1: Anterior  Pain Description 1: Cramping; Shooting  Pain Intervention(s) 1: Medication (see MAR); Encouraged PCA  Activity Tolerance:   VSS  Please refer to the flowsheet for vital signs taken during this treatment.   After treatment:   [X]    Patient left in no apparent distress sitting up in chair  [ ]    Patient left in no apparent distress in bed  [X]    Call bell left within reach  [X]    Nursing notified  [ ]    Caregiver present  [ ]    Bed alarm activated      COMMUNICATION/COLLABORATION:   The patients plan of care was discussed with: Physical Therapist and Registered Nurse     Stone Crawford PT, DPT   Time Calculation: 15 mins

## 2017-07-21 NOTE — PROGRESS NOTES
Palliative Medicine Consult  [place]: [phone])    Patient Name: Kat Hampton  YOB: 1975    Date of Initial Consult: 6/27/17  Reason for Consult: overwhelming symptoms  Requesting Provider: Cate Milian NP  Primary Care Physician: Samuel Royal MD      SUMMARY:   Kat Hampton is a 46 y.o. with a past history of Crohn's disease, anxiety, chronic pain, hx DVT,multiple (4) small bowel resections, s/p recent urgent dx lap, with lysis of adhesions, repair of suspected perf 6/7/17 , who was admitted on 6/25/2017 from home with a diagnosis of worsening abdominal pain, elevated WBC and MRSA wound infection. Initially consulted for pain management thought to be possible Crohn's flare. Initial CT on 6/25 w/out acute findings but repeat on 6/28 showing ischemic bowl. S/p ex lap but ischemic bowl unresectable, s/p SMA stent. Extubated on 6/30. Most recently s/p ex lap, KATHRINE, feeding tube, fistula exclusion and wound vac placement for EC fistula. Patient is followed chronically for pain management by Dr. Epps Staff at [address] [place] [address]. Home regimen for chronic abdominal pain is MS Contin 60 Q8 and MS IR 30 Q6 PRN. She also takes Xanax 1mg TID prn anxiety.  reviewed, opioids last prescribed on 5/19/17. PALLIATIVE DIAGNOSES:     1. Abdominal pain, generalized  2. Anxiety  3. Nausea  4. Debility   5. Crohn's disease  6. Chronic constipation (at home on Relistor)  7. MRSA wound        PLAN:   1. Patient appears comfortable, reports pain at 7 / 10, not too far from her usual 5/10; pt used ~3 pca doses / hr for last few hours; she required IV PRN dilaudid earlier, had pain associated with wound care; she is more alert today, denies issues with anxiety, seems to be doing ok on lower dose ativan  2. Continue Morphine PCA 4mg/h basal, 5mg every 10 min demand. Seems to control the pain and w/out significant confusion or drowsiness thus far (this has been a problem before). Pt feels that Morphine works better than Dilaudid (at equivalent doses). 3. Cont prn Dilaudid 1mg IV every 1h prn pain that is not controlled by PCA- hortencia before any dressing changes  4. On scheduled ativan for anxiety. Seems to be doing ok on lower 0.76mg TID IV dose as of 7/20. Continue assessment  5. PRN antiemetics  6. Bowel regimen per Surgery team - has chronic constipation, now on PCA. 7. Psychosocial support- discussed with pt about her concerns today -- pt suffering from loss of function and identity. Again mentioned her feeling that people are judging her for her pain medication requirement. Both palliative care  Bhumika, music therapist Rowan, involved  8. Communicated plan of care with: Palliative IDT; have been in communication w/ Dr Emil Camacho. GOALS OF CARE / TREATMENT PREFERENCES:   [====Goals of Care====]  GOALS OF CARE:  Patient / health care proxy stated goals: pain control  Recovery from acute issues      TREATMENT PREFERENCES:   Code Status: Full Code    Advance Care Planning:  Advance Care Planning 6/26/2017   Patient's Healthcare Decision Maker is: Legal Next of Camilo 71   Primary Decision Maker Name Quinn Dominguez   Primary Decision Maker Phone Number [phone]   Primary Decision Maker Relationship to Patient Parent   Confirm Advance Directive None   Patient Would Like to Complete Advance Directive No       Other:    The palliative care team has discussed with patient / health care proxy about goals of care / treatment preferences for patient.  [====Goals of Care====]         HISTORY:     Reviewed vitals, I/O's, labs, imaging, MAR, notes.   Cr 1.04    MAR  Albuterol prn, dose 7/18  Dilaudid 1mg IV q1h PRN, 1 dose 7/18, 2 doses 7/21  Ativan 0.76mg IV q8h scheduled  Morphine pca, 4mg/hr basal, 5mg pca dose q10min, 4 hour lockout of 120mg  Octreotide starting 7/17  zofran IV PRN, 1 dose 7/18, 2 dose 7/19, 3 dose 7/20, 2 dose 7/21  Compazine IV 5mg PRN, 1 dose 7/19  Anidulafungin, dmitriy cortes  TPN    HPI/SUBJECTIVE:    The patient is:   [x] Verbal and participatory  [] Non-participatory due to: Medical condition     Pt reports abd pain 7 / 10, baseline is 5/10. No nausea sob. Pain worse with wound care. Feels more alert today, denies anxiety. Clinical Pain Assessment (nonverbal scale for severity on nonverbal patients):   [++++ Clinical Pain Assessment++++]  [++++Pain Severity++++]: Pain: 7  [++++Pain Character++++]: sharp and burning, stabbing  [++++Pain Duration++++]: several days  [++++Pain Effect++++]:  feeling anxious  [++++Pain Factors++++]: better after pain medicaion  [++++Pain Frequency++++]: constant  [++++Pain Location++++]: across abdomen both sides in middle at incision site  [++++ Clinical Pain Assessment++++]     FUNCTIONAL ASSESSMENT:     Palliative Performance Scale (PPS):  PPS: 40       PSYCHOSOCIAL/SPIRITUAL SCREENING:     Advance Care Planning:  Advance Care Planning 6/26/2017   Patient's Healthcare Decision Maker is: Legal Next of Camilo 71   Primary Decision Maker Name Adina Garcia   Primary Decision Maker Phone Number [phone]   Primary Decision Maker Relationship to Patient Parent   Confirm Advance Directive None   Patient Would Like to Complete Advance Directive No        Any spiritual / Mormonism concerns:  [] Yes /  [x] No    Caregiver Burnout:  [] Yes /  [x] No /  [] No Caregiver Present      Anticipatory grief assessment:   [x] Normal  / [] Maladaptive       ESAS Anxiety: Anxiety: 3    ESAS Depression: Depression: 2        REVIEW OF SYSTEMS:     Positive and pertinent negative findings in ROS are noted above in HPI. The following systems were [x] reviewed / [] unable to be reviewed as noted in HPI  Other findings are noted below. Systems: constitutional, ears/nose/mouth/throat, respiratory, gastrointestinal, genitourinary, musculoskeletal, integumentary, neurologic, psychiatric, endocrine. Positive findings noted below.   Modified ESAS Completed by: provider Fatigue: 4 Drowsiness: 0   Depression: 2 Pain: 7   Anxiety: 3 Nausea: 0   Anorexia: 0 Dyspnea: 0     Constipation: No              PHYSICAL EXAM:     From RN flowsheet:  Wt Readings from Last 3 Encounters:   07/21/17 324 lb 1.2 oz (147 kg)   06/25/17 343 lb (155.6 kg)   06/22/17 343 lb (155.6 kg)     Blood pressure 107/56, pulse 99, temperature 98.1 °F (36.7 °C), resp. rate 18, height 5' 4\" (1.626 m), weight 324 lb 1.2 oz (147 kg), last menstrual period 05/21/2017, SpO2 100 %, not currently breastfeeding. Pain Scale 1: Numeric (0 - 10)  Pain Intensity 1: 8  Pain Onset 1: chronic  Pain Location 1: Abdomen  Pain Orientation 1: Anterior  Pain Description 1: Stabbing, Other (comment)  Pain Intervention(s) 1: Medication (see MAR), Encouraged PCA, Rest  Last bowel movement, if known: stool in ostomy     Constitutional:awake, alert, oriented, nad  Eyes: pupils equal, anicteric  ENMT: no nasal discharge, moist mucous membranes  Cardiac: regular rhythm   Respiratory: breathing not labored  Gastrointestinal: midline incision w/ wound vac, b/l wounds, hypoactive bowel sounds   Musculoskeletal: no deformity, no tenderness to palpation  Skin: warm, dry, no edema  Neurologic: moving all extremities  Psych: no agitation, restricted affect, no hallucinations     HISTORY:     Active Problems:    Abdominal pain (6/25/2017)      Small bowel ischemia (HCC) (6/28/2017)      Overview: CT abd/pelvis 6/28/17      1. The stomach and proximal small bowel loops are distended. There is a       loop of      small bowel in the midabdomen which is mildly dilated and does not       demonstrate      enhancement in the wall and there is suspicion of pneumatosis and this       leads to      a loop of small bowel which demonstrates thickening of the wall and       findings are      suspicious for ischemia. 2. There is extensive mesenteric edema and a small amount of ascites in       the      pelvis.       Superior mesenteric artery thrombosis ([facility] (6/28/2017)      Overview: CT abd/pelvis 6/28/17:      3. There is nonocclusive thrombus in the SMA. 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)      Past Medical History:   Diagnosis Date    [facility] 8/15/2011    DVT (deep venous thrombosis) ([facility] 8/15/2011    Incarcerated ventral hernia 8/15/2011    Right flank pain 8/22/2011    Seizures ([facility]     Stroke [facility])       Past Surgical History:   Procedure Laterality Date    HX OTHER SURGICAL      multiple procedures related to her Crohn's disease    WA LAP, INCISIONAL HERNIA REPAIR,INCARCERATED  9-10-08    dr. Darnell Fink      Family History   Problem Relation Age of Onset    Hypertension Father     Stroke Father     Other Father      arthritis      History reviewed, no pertinent family history.   Social History   Substance Use Topics    Smoking status: Former Smoker    Smokeless tobacco: Not on file    Alcohol use No     Allergies   Allergen Reactions    Demerol [Meperidine] Unknown (comments)    Soma [Carisoprodol] Rash and Nausea Only    Sulfa (Sulfonamide Antibiotics) Unknown (comments)    Toradol [Ketorolac Tromethamine] Unknown (comments)      Current Facility-Administered Medications   Medication Dose Route Frequency    TPN ADULT - CENTRAL   IntraVENous TITRATE    LORazepam (ATIVAN) injection 0.76 mg  0.76 mg IntraVENous Q8H    albuterol (PROVENTIL VENTOLIN) nebulizer solution 2.5 mg  2.5 mg Inhalation Q4H PRN    octreotide (SANDOSTATIN) injection 50 mcg  50 mcg IntraVENous TID    vancomycin (VANCOCIN) 1250 mg in  ml infusion  1,250 mg IntraVENous Q24H    clopidogrel (PLAVIX) tablet 75 mg  75 mg Oral DAILY    sodium chloride (NS) flush 10 mL  10 mL InterCATHeter Q24H    sodium chloride (NS) flush 10 mL  10 mL InterCATHeter PRN    sodium chloride (NS) flush 10-40 mL  10-40 mL InterCATHeter Q8H    alteplase (CATHFLO) 1 mg in sterile water (preservative free) 1 mL injection  1 mg InterCATHeter PRN    bacitracin 500 unit/gram packet 1 Packet  1 Packet Topical PRN    0.9% sodium chloride infusion 250 mL  250 mL IntraVENous PRN    lactated Ringers infusion  75 mL/hr IntraVENous CONTINUOUS    fat emulsion 20% (LIPOSYN, INTRALIPID) infusion 500 mL  500 mL IntraVENous Q MON, WED & FRI    HYDROmorphone (PF) (DILAUDID) injection 1 mg  1 mg IntraVENous Q1H PRN    aspirin chewable tablet 81 mg  81 mg Oral DAILY    insulin regular (NOVOLIN R, HUMULIN R) injection   SubCUTAneous Q6H    morphine (PF)  mg/30 ml   IntraVENous CONTINUOUS    glucose chewable tablet 16 g  4 Tab Oral PRN    glucagon (GLUCAGEN) injection 1 mg  1 mg IntraMUSCular PRN    dextrose 10 % infusion 125-250 mL  125-250 mL IntraVENous PRN    pantoprazole (PROTONIX) 40 mg in sodium chloride 0.9 % 10 mL injection  40 mg IntraVENous DAILY    prochlorperazine (COMPAZINE) with saline injection 5 mg  5 mg IntraVENous Q6H PRN    anidulafungin (ERAXIS) 100 mg in 0.9% sodium chloride 130 mL IVPB  100 mg IntraVENous Q24H    ondansetron (ZOFRAN) injection 8 mg  8 mg IntraVENous Q6H PRN    sodium chloride (NS) flush 5-10 mL  5-10 mL IntraVENous Q8H    sodium chloride (NS) flush 5-10 mL  5-10 mL IntraVENous PRN    naloxone (NARCAN) injection 0.4 mg  0.4 mg IntraVENous PRN    diphenhydrAMINE (BENADRYL) injection 12.5 mg  12.5 mg IntraVENous Q4H PRN    piperacillin-tazobactam (ZOSYN) 3.375 g in 0.9% sodium chloride (MBP/ADV) 100 mL  3.375 g IntraVENous Q8H    Vancomycin ---Pharmacy to Dose   Other Rx Dosing/Monitoring          LAB AND IMAGING FINDINGS:     Lab Results   Component Value Date/Time    WBC 9.2 07/21/2017 04:09 AM    HGB 7.6 07/21/2017 04:09 AM    PLATELET 230 03/77/0703 04:09 AM     Lab Results   Component Value Date/Time    Sodium 139 07/21/2017 04:09 AM    Potassium 4.0 07/21/2017 04:09 AM    Chloride 107 07/21/2017 04:09 AM    CO2 24 07/21/2017 04:09 AM    BUN 24 07/21/2017 04:09 AM    Creatinine 1.04 07/21/2017 04:09 AM    Calcium 8.6 07/21/2017 04:09 AM    Magnesium 1.7 07/21/2017 04:09 AM    Phosphorus 4.4 07/21/2017 04:09 AM      Lab Results   Component Value Date/Time    AST (SGOT) 11 07/08/2017 04:42 AM    Alk. phosphatase 120 07/08/2017 04:42 AM    Protein, total 7.0 07/08/2017 04:42 AM    Albumin 1.5 07/08/2017 04:42 AM    Globulin 5.5 07/08/2017 04:42 AM     No results found for: INR, PTMR, PTP, PT1, PT2, APTT   No results found for: IRON, FE, TIBC, IBCT, PSAT, FERR   No results found for: PH, PCO2, PO2  No components found for: GLPOC   No results found for: CPK, CKMB             Total time:    Counseling / coordination time, spent as noted above:    > 50% counseling / coordination?:     Prolonged service was provided for  []30 min   []75 min in face to face time in the presence of the patient, spent as noted above. Time Start:   Time End:   Note: this can only be billed with 53431 (initial) or 82820 (follow up). If multiple start / stop times, list each separately.

## 2017-07-21 NOTE — PROGRESS NOTES
ID Progress Note  2017    Subjective:     Seems to be having an ok day--walked some today    Objective:     Antibiotics:  1. Vancomycin  2. Zosyn   3. Eraxis      Vitals:   Visit Vitals    /56 (BP 1 Location: Right arm, BP Patient Position: Sitting)    Pulse 99    Temp 98.1 °F (36.7 °C)    Resp 18    Ht 5' 4\" (1.626 m)    Wt 147 kg (324 lb 1.2 oz)    LMP 2017 (Approximate)    SpO2 100%    Breastfeeding No    BMI 55.63 kg/m2        Tmax:  Temp (24hrs), Av.4 °F (36.9 °C), Min:98.1 °F (36.7 °C), Max:98.8 °F (37.1 °C)      Exam:  Lungs:  clear to auscultation bilaterally  Heart:  regularly irregular rhythm  Abdomen:  abnormal findings:  tenderness moderate in the entire abdomen, hypoactive bowel sounds    Labs:      Recent Labs      17   0409  17   0450  17   0210   WBC  9.2  8.5  9.5   HGB  7.6*  7.5*  7.6*   PLT  342  338  353   BUN  24*  24*  24*   CREA  1.04*  1.03*  1.02       Cultures:     Lab Results   Component Value Date/Time    Specimen Description: URINE 2009 07:45 PM     Lab Results   Component Value Date/Time    Culture result: NO GROWTH ON SOLID MEDIA AT 4 DAYS 2017 12:17 PM    Culture result:  2017 12:17 PM     **METHICILLIN RESISTANT STAPHYLOCOCCUS AUREUS**  ISOLATED FROM THIO BROTH ONLY      Culture result: NO GROWTH 5 DAYS 2017 02:35 PM       Radiology:     Line/Insert Date:           Assessment:     1. Ischemic gut with frozen abdomen  2. Leukocytosis--back up after surgery--trending back down overall--down to normal  3. Cultures negative to date    Objective:     1. Continue IV therapy  2. Follow up cultures and studies  3. Work up fevers  4.  Group will see over the weekend if asked    Jud Xie MD

## 2017-07-21 NOTE — WOUND CARE
WOCN Note:     Follow-up visit for VAC change and ostomy care.      Chart shows:  Admitted for nausea, vomiting, and abdominal pain; history of Crohn's, DVT, bowel perforation with surgery 6/7/17. Exploratory lap, KATHRINE, fistula exclusion and tube placement by Dr. Yfn Julien 7/7/17. On zosyn & Vancomycin     Assessment:   Patient is A&O x 4 and mobile - ambulated back to bed from chair independently. Bed: total care bariatric with air mattress   Diet: TPN with some clears  Pure Wick in use & draining clear yellow. Tolerates VAC change fairly well.       Mucus fistula LLQ: red & Moist and almost at skin level; some mucosal transplantation noted; output is light green mucus.       1. Midline abdominal incision with three drains visible in the base = 20 x 11 x 4.6 cm  Ana. Base is 20% moist red mucosal tissue with peristalsis that now reaches the left side drain insertion, 20% scattered moist yellow 40% moist pink with some mucus, 20% granular walls up to margin. No exudate in cannister but ~10cc lisa in wall suction today. RUQ drain to bedside bag clogged with yellow mucus. LUQ drain to bedside bag with green effluent. RLQ drain with end freely floating in wound to wall suction. It is sandwiched between the white and black sponge in wound. Periwound intact & without erythema. Dressed using 2 pieces of petroleum gauze as base layer over most of wound bed, 1 white sponge (RLQ drain rests on top of white sponge), 1 black sponge. Suction is achieved with KCI VAC @ 25 mmHg. Recommendations:    Maintain VAC as ordered. Discussed above plan with patient & RN. Transition of Care: Plan to follow as needed while admitted to hospital with [facility] changes on Tuesdays and Fridays.      SONY Finney, RN, [facility]  Certified Wound, Ostomy, Continence Nurse  office [phone]  pager [phone] or call  to page

## 2017-07-21 NOTE — PROGRESS NOTES
7:45 PM  Bedside and Verbal shift change report given to Suzanne Fuentes RN (oncoming nurse) by Luis Eduardo Nguyen RN and Dinh Jo RN (offgoing nurse). Report included the following information SBAR, OR Summary, Intake/Output, MAR, Med Rec Status and Cardiac Rhythm NSR.

## 2017-07-21 NOTE — PROGRESS NOTES
Follow up visit with Catherine Ames. Pt's nurse was present at bedside offering care. Alexherminio Hui shared that she was not having a good morning and was not up for a visit at this time. Assured her that chaplains would continue to check in on her and that she could always decline the visit. Explored if there was anything (beyond a visit) that I could be to be of support at this time. Pt indicated no - offered words of comfort and assurance. Pt's nurse remained present with pt.     Bhumika Renee, Palliative

## 2017-07-22 LAB
ANION GAP BLD CALC-SCNC: 8 MMOL/L (ref 5–15)
BASOPHILS # BLD AUTO: 0 K/UL (ref 0–0.1)
BASOPHILS # BLD: 0 % (ref 0–1)
BUN SERPL-MCNC: 25 MG/DL (ref 6–20)
BUN/CREAT SERPL: 23 (ref 12–20)
CALCIUM SERPL-MCNC: 8.6 MG/DL (ref 8.5–10.1)
CHLORIDE SERPL-SCNC: 106 MMOL/L (ref 97–108)
CO2 SERPL-SCNC: 23 MMOL/L (ref 21–32)
CREAT SERPL-MCNC: 1.07 MG/DL (ref 0.55–1.02)
EOSINOPHIL # BLD: 0.7 K/UL (ref 0–0.4)
EOSINOPHIL NFR BLD: 7 % (ref 0–7)
ERYTHROCYTE [DISTWIDTH] IN BLOOD BY AUTOMATED COUNT: 16.3 % (ref 11.5–14.5)
GLUCOSE BLD STRIP.AUTO-MCNC: 109 MG/DL (ref 65–100)
GLUCOSE BLD STRIP.AUTO-MCNC: 131 MG/DL (ref 65–100)
GLUCOSE BLD STRIP.AUTO-MCNC: 154 MG/DL (ref 65–100)
GLUCOSE BLD STRIP.AUTO-MCNC: 98 MG/DL (ref 65–100)
GLUCOSE SERPL-MCNC: 119 MG/DL (ref 65–100)
HCT VFR BLD AUTO: 25.7 % (ref 35–47)
HGB BLD-MCNC: 8.1 G/DL (ref 11.5–16)
LYMPHOCYTES # BLD AUTO: 44 % (ref 12–49)
LYMPHOCYTES # BLD: 4.7 K/UL (ref 0.8–3.5)
MAGNESIUM SERPL-MCNC: 2 MG/DL (ref 1.6–2.4)
MCH RBC QN AUTO: 28.6 PG (ref 26–34)
MCHC RBC AUTO-ENTMCNC: 31.5 G/DL (ref 30–36.5)
MCV RBC AUTO: 90.8 FL (ref 80–99)
MONOCYTES # BLD: 1 K/UL (ref 0–1)
MONOCYTES NFR BLD AUTO: 10 % (ref 5–13)
NEUTS SEG # BLD: 4.1 K/UL (ref 1.8–8)
NEUTS SEG NFR BLD AUTO: 39 % (ref 32–75)
PHOSPHATE SERPL-MCNC: 4.4 MG/DL (ref 2.6–4.7)
PLATELET # BLD AUTO: 376 K/UL (ref 150–400)
POTASSIUM SERPL-SCNC: 3.9 MMOL/L (ref 3.5–5.1)
RBC # BLD AUTO: 2.83 M/UL (ref 3.8–5.2)
SERVICE CMNT-IMP: ABNORMAL
SERVICE CMNT-IMP: NORMAL
SODIUM SERPL-SCNC: 137 MMOL/L (ref 136–145)
WBC # BLD AUTO: 10.6 K/UL (ref 3.6–11)

## 2017-07-22 PROCEDURE — 3331090002 HH PPS REVENUE DEBIT

## 2017-07-22 PROCEDURE — 84100 ASSAY OF PHOSPHORUS: CPT | Performed by: SURGERY

## 2017-07-22 PROCEDURE — 65660000000 HC RM CCU STEPDOWN

## 2017-07-22 PROCEDURE — 74011250637 HC RX REV CODE- 250/637: Performed by: SURGERY

## 2017-07-22 PROCEDURE — 74011000258 HC RX REV CODE- 258: Performed by: INTERNAL MEDICINE

## 2017-07-22 PROCEDURE — 74011250636 HC RX REV CODE- 250/636: Performed by: INTERNAL MEDICINE

## 2017-07-22 PROCEDURE — 74011000258 HC RX REV CODE- 258: Performed by: NURSE PRACTITIONER

## 2017-07-22 PROCEDURE — 74011000250 HC RX REV CODE- 250: Performed by: NURSE PRACTITIONER

## 2017-07-22 PROCEDURE — 74011250636 HC RX REV CODE- 250/636: Performed by: SURGERY

## 2017-07-22 PROCEDURE — 85025 COMPLETE CBC W/AUTO DIFF WBC: CPT | Performed by: SURGERY

## 2017-07-22 PROCEDURE — 74011636637 HC RX REV CODE- 636/637: Performed by: NURSE PRACTITIONER

## 2017-07-22 PROCEDURE — 74011000250 HC RX REV CODE- 250: Performed by: SURGERY

## 2017-07-22 PROCEDURE — 74011636637 HC RX REV CODE- 636/637: Performed by: SURGERY

## 2017-07-22 PROCEDURE — 74011000258 HC RX REV CODE- 258: Performed by: SURGERY

## 2017-07-22 PROCEDURE — 74011250636 HC RX REV CODE- 250/636: Performed by: NURSE PRACTITIONER

## 2017-07-22 PROCEDURE — 74011250636 HC RX REV CODE- 250/636: Performed by: EMERGENCY MEDICINE

## 2017-07-22 PROCEDURE — 74011250636 HC RX REV CODE- 250/636: Performed by: PHYSICAL MEDICINE & REHABILITATION

## 2017-07-22 PROCEDURE — 82962 GLUCOSE BLOOD TEST: CPT

## 2017-07-22 PROCEDURE — 36415 COLL VENOUS BLD VENIPUNCTURE: CPT | Performed by: SURGERY

## 2017-07-22 PROCEDURE — 3331090001 HH PPS REVENUE CREDIT

## 2017-07-22 PROCEDURE — 80048 BASIC METABOLIC PNL TOTAL CA: CPT | Performed by: SURGERY

## 2017-07-22 PROCEDURE — C9113 INJ PANTOPRAZOLE SODIUM, VIA: HCPCS | Performed by: SURGERY

## 2017-07-22 PROCEDURE — 83735 ASSAY OF MAGNESIUM: CPT | Performed by: SURGERY

## 2017-07-22 RX ADMIN — ONDANSETRON 8 MG: 2 INJECTION INTRAMUSCULAR; INTRAVENOUS at 16:39

## 2017-07-22 RX ADMIN — ASPIRIN 81 MG CHEWABLE TABLET 81 MG: 81 TABLET CHEWABLE at 08:25

## 2017-07-22 RX ADMIN — OCTREOTIDE ACETATE 50 MCG: 50 INJECTION, SOLUTION INTRAVENOUS; SUBCUTANEOUS at 16:34

## 2017-07-22 RX ADMIN — PIPERACILLIN SODIUM,TAZOBACTAM SODIUM 3.38 G: 3; .375 INJECTION, POWDER, FOR SOLUTION INTRAVENOUS at 21:57

## 2017-07-22 RX ADMIN — PIPERACILLIN SODIUM,TAZOBACTAM SODIUM 3.38 G: 3; .375 INJECTION, POWDER, FOR SOLUTION INTRAVENOUS at 07:33

## 2017-07-22 RX ADMIN — SODIUM CHLORIDE 40 MG: 9 INJECTION INTRAMUSCULAR; INTRAVENOUS; SUBCUTANEOUS at 08:25

## 2017-07-22 RX ADMIN — Medication 10 ML: at 07:34

## 2017-07-22 RX ADMIN — OCTREOTIDE ACETATE 50 MCG: 50 INJECTION, SOLUTION INTRAVENOUS; SUBCUTANEOUS at 08:26

## 2017-07-22 RX ADMIN — VANCOMYCIN HYDROCHLORIDE 1250 MG: 10 INJECTION, POWDER, LYOPHILIZED, FOR SOLUTION INTRAVENOUS at 12:30

## 2017-07-22 RX ADMIN — Medication: at 07:27

## 2017-07-22 RX ADMIN — CLOPIDOGREL BISULFATE 75 MG: 75 TABLET ORAL at 08:25

## 2017-07-22 RX ADMIN — Medication 0.76 MG: at 14:37

## 2017-07-22 RX ADMIN — HYDROMORPHONE HYDROCHLORIDE 1 MG: 1 INJECTION, SOLUTION INTRAMUSCULAR; INTRAVENOUS; SUBCUTANEOUS at 19:35

## 2017-07-22 RX ADMIN — SODIUM CHLORIDE 100 MG: 900 INJECTION, SOLUTION INTRAVENOUS at 19:03

## 2017-07-22 RX ADMIN — HUMAN INSULIN 2 UNITS: 100 INJECTION, SOLUTION SUBCUTANEOUS at 23:27

## 2017-07-22 RX ADMIN — Medication 0.76 MG: at 07:33

## 2017-07-22 RX ADMIN — Medication: at 23:14

## 2017-07-22 RX ADMIN — CALCIUM GLUCONATE: 94 INJECTION, SOLUTION INTRAVENOUS at 19:03

## 2017-07-22 RX ADMIN — Medication 0.76 MG: at 21:57

## 2017-07-22 RX ADMIN — PIPERACILLIN SODIUM,TAZOBACTAM SODIUM 3.38 G: 3; .375 INJECTION, POWDER, FOR SOLUTION INTRAVENOUS at 14:37

## 2017-07-22 RX ADMIN — OCTREOTIDE ACETATE 50 MCG: 50 INJECTION, SOLUTION INTRAVENOUS; SUBCUTANEOUS at 21:57

## 2017-07-22 RX ADMIN — Medication: at 16:36

## 2017-07-22 RX ADMIN — HYDROMORPHONE HYDROCHLORIDE 1 MG: 1 INJECTION, SOLUTION INTRAMUSCULAR; INTRAVENOUS; SUBCUTANEOUS at 08:25

## 2017-07-22 NOTE — PROGRESS NOTES
Bedside shift change report given to Landon Bailey (oncoming nurse) by Christina Urbina (offgoing nurse). Report included the following information SBAR, Kardex, Intake/Output, Recent Results . Annamary Ripa

## 2017-07-22 NOTE — PROGRESS NOTES
Bedside and Verbal shift change report given to Carson Mcnair (oncoming nurse) by Rama Plascencia (offgoing nurse). Report included the following information SBAR, Kardex, ED Summary, OR Summary, Procedure Summary, Intake/Output, MAR, Accordion, Recent Results, Med Rec Status, Cardiac Rhythm NSR and Alarm Parameters . 0330 report received     HS: no events; vss, tolerating PCA and ativan, CBG W/in limits, TPN Lipids, urinates using periwick overnight    Bedside and Verbal shift change report given to April (oncoming nurse) by Carson Mcnair (offgoing nurse). Report included the following information SBAR, Kardex, ED Summary, OR Summary, Procedure Summary, Intake/Output, MAR, Accordion, Recent Results, Med Rec Status, Cardiac Rhythm NSR and Alarm Parameters .

## 2017-07-22 NOTE — PROGRESS NOTES
Progress Note    Patient: Valdo Hein MRN: [id number]  SSN: [id number]    YOB: 1975  Age: 46 y.o. Sex: female      Admit Date: 2017    9 Days Post-Op  PREOPERATIVE DIAGNOSIS: Enterocutaneous fistula with   abdominal wound infection.      POSTOPERATIVE DIAGNOSIS: Enterocutaneous fistula with frozen   abdomen and abdominal wound infection.      PROCEDURES PERFORMED:   1. Exploratory laparotomy with lysis of adhesions for 2 hours. 2. Fistula exclusion with feeding tube placement. 3. Wound vacuum-assisted closure placement.         Subjective:     Patient has complaints of pain. Mostly at right upper drain and feels like suture is pulling. Tolerating vac and \"that is fine\". Wants to advance to full liquid diet and \"really hungry\". No nausea or vomiting. Multiple drains unchanged and no events overnight. No fevers  Pain control with PCA  Slept well. Says she has been getting up to BR and walking some   Nothing out of ileostomy     Objective:     Visit Vitals    /44 (BP 1 Location: Right arm, BP Patient Position: At rest)    Pulse 92    Temp 98 °F (36.7 °C)    Resp 18    Ht 5' 4\" (1.626 m)    Wt 324 lb 1.2 oz (147 kg)    SpO2 94%    Breastfeeding No    BMI 55.63 kg/m2       Temp (24hrs), Av.3 °F (36.8 °C), Min:98 °F (36.7 °C), Max:98.6 °F (37 °C)      Physical Exam:    Easily awakened and in bed, mom at bedside  Room dark   Chest  Clear   COR  Regular   ABD Soft, obese; midline vac in place with good seal; right and left sided drains intact and dry; suture is pulling a bit on the RUQ drain;  few BS, ileostomy bag with scant drainage, no gas in bag   EXT Moving all extremities in the bed    Data Review: reviewed  Consultants documentation, Nursing documentation and I & O    Date 17 - 17 - 17 0659   Shift 2487-9147 2926-4102 24 Hour Total 7755-0294 5335-5816 24 Hour Total   I  N  T  A  K  E   P.O. 850  850         P. O. 850  850       I.V.  (mL/kg/hr) 4182.6  (2.4) 606.8  (0.3) 4789.4  (1.4)         Volume (lactated Ringers infusion) 1218.8  1218.8         Volume (vancomycin (VANCOCIN) 1250 mg in  ml infusion) 250  250         Volume (piperacillin-tazobactam (ZOSYN) 3.375 g in 0.9% sodium chloride (MBP/ADV) 100 mL) 300 100 400         Volume (fat emulsion 20% (LIPOSYN, INTRALIPID) infusion 500 mL) 968.8 166.8 1135.6         Volume (TPN ADULT - CENTRAL) 1445  1445         Volume (TPN ADULT - CENTRAL)  340 340       Other 0  0         Intake (ml) (Vacuum Assisted Closure Anterior;Mid Abdominal) 0  0       Shift Total  (mL/kg) 5032.6  (34.2) 606.8  (4.1) 5639.4  (38.4)      O  U  T  P  U  T   Urine  (mL/kg/hr) 1300  (0.7) 1200  (0.7) 2500  (0.7)         Urine Voided 800  800         Urine Occurrence(s) 1 x  1 x         Urine Output (mL) (External Female Catheter 07/12/17) 500 1200 1700       Drains 3150 875 4025         Output (ml) (Vacuum Assisted Closure Anterior;Mid Abdominal) 0  0         Output (ml) ([place] Drain (Abdominal Drainage) 07/07/17) 2600 420 3020         Output (ml) ([place] Drain (Abdominal Drainage) 07/07/17) 50 55 105         Output (ml) Bethene Neth Drain 07/07/17 Right Abdomen) 500 400 900       Stool 0 0 0         Output (ml) (Ileostomy 06/07/00 Lower  Abdomen) 0 0 0       Shift Total  (mL/kg) 4450  (30.3) 2075  (14.1) 6525  (44.4)      .6 -1468.2 -885. 6      Weight (kg) 147 147 147 147 147 147       Lab Review: All lab results for the last 24 hours reviewed. Assessment:     Hospital Problems  Date Reviewed: 6/28/2017          Codes Class Noted POA    Enterocutaneous fistula ICD-10-CM: K63.2  ICD-9-CM: 569.81  6/30/2017 No        Small bowel ischemia (HCC) ICD-10-CM: K55.9  ICD-9-CM: 557.9  6/28/2017 Clinically Undetermined    Overview Signed 7/3/2017  5:08 PM by Jose Luis Byrd MD     CT abd/pelvis 6/28/17  1. The stomach and proximal small bowel loops are distended.  There is a loop of  small bowel in the midabdomen which is mildly dilated and does not demonstrate  enhancement in the wall and there is suspicion of pneumatosis and this leads to  a loop of small bowel which demonstrates thickening of the wall and findings are  suspicious for ischemia. 2. There is extensive mesenteric edema and a small amount of ascites in the  pelvis. Superior mesenteric artery thrombosis [facility]) ICD-10-CM: K55.069  ICD-9-CM: 557.0  6/28/2017 Clinically Undetermined    Overview Signed 7/3/2017  5:09 PM by Madhu Royal MD     CT abd/pelvis 6/28/17:  3. There is nonocclusive thrombus in the SMA. Abdominal pain ICD-10-CM: R10.9  ICD-9-CM: 789.00  6/25/2017 Yes              Plan/Recommendations/Medical Decision Making:     Continue present treatment  IV antibiotics   GI and DVT prophylaxis   Continue with sips clears for now   Renewed TPN   OOB as much as possible   Jerome drains to minimize pulling   Pain management  PCA  Continue vac   Dr. Luisana Garza covering this weekend and will follow      Signed By: Reyna Banda NP     July 22, 2017       Pt seen and examined  Agree with above  Wants full liquid diet  No nausea or vomiting. Gen- Alert in nad  Abd- soft- vac in place   Left drain- bilious   Right malinkodt- minimal   Jada Medina - serosanginous    Overall doing well-   Continue abx and tpn  Explained to her the rationale for sips of clears vs fulls.    OOB and ambulate./

## 2017-07-23 LAB
ANION GAP BLD CALC-SCNC: 7 MMOL/L (ref 5–15)
BASOPHILS # BLD AUTO: 0 K/UL (ref 0–0.1)
BASOPHILS # BLD: 0 % (ref 0–1)
BUN SERPL-MCNC: 24 MG/DL (ref 6–20)
BUN/CREAT SERPL: 24 (ref 12–20)
CALCIUM SERPL-MCNC: 8.1 MG/DL (ref 8.5–10.1)
CHLORIDE SERPL-SCNC: 107 MMOL/L (ref 97–108)
CO2 SERPL-SCNC: 25 MMOL/L (ref 21–32)
CREAT SERPL-MCNC: 1 MG/DL (ref 0.55–1.02)
EOSINOPHIL # BLD: 0.4 K/UL (ref 0–0.4)
EOSINOPHIL NFR BLD: 5 % (ref 0–7)
ERYTHROCYTE [DISTWIDTH] IN BLOOD BY AUTOMATED COUNT: 16.8 % (ref 11.5–14.5)
GLUCOSE BLD STRIP.AUTO-MCNC: 124 MG/DL (ref 65–100)
GLUCOSE BLD STRIP.AUTO-MCNC: 160 MG/DL (ref 65–100)
GLUCOSE BLD STRIP.AUTO-MCNC: 99 MG/DL (ref 65–100)
GLUCOSE SERPL-MCNC: 112 MG/DL (ref 65–100)
HCT VFR BLD AUTO: 24.1 % (ref 35–47)
HGB BLD-MCNC: 7.7 G/DL (ref 11.5–16)
LYMPHOCYTES # BLD AUTO: 39 % (ref 12–49)
LYMPHOCYTES # BLD: 3.8 K/UL (ref 0.8–3.5)
MAGNESIUM SERPL-MCNC: 1.7 MG/DL (ref 1.6–2.4)
MCH RBC QN AUTO: 28.9 PG (ref 26–34)
MCHC RBC AUTO-ENTMCNC: 32 G/DL (ref 30–36.5)
MCV RBC AUTO: 90.6 FL (ref 80–99)
MONOCYTES # BLD: 1.1 K/UL (ref 0–1)
MONOCYTES NFR BLD AUTO: 11 % (ref 5–13)
NEUTS SEG # BLD: 4.3 K/UL (ref 1.8–8)
NEUTS SEG NFR BLD AUTO: 45 % (ref 32–75)
PHOSPHATE SERPL-MCNC: 3.9 MG/DL (ref 2.6–4.7)
PLATELET # BLD AUTO: 352 K/UL (ref 150–400)
POTASSIUM SERPL-SCNC: 3.9 MMOL/L (ref 3.5–5.1)
RBC # BLD AUTO: 2.66 M/UL (ref 3.8–5.2)
SERVICE CMNT-IMP: ABNORMAL
SERVICE CMNT-IMP: ABNORMAL
SERVICE CMNT-IMP: NORMAL
SODIUM SERPL-SCNC: 139 MMOL/L (ref 136–145)
WBC # BLD AUTO: 9.6 K/UL (ref 3.6–11)

## 2017-07-23 PROCEDURE — 74011250637 HC RX REV CODE- 250/637: Performed by: SURGERY

## 2017-07-23 PROCEDURE — 74011000250 HC RX REV CODE- 250: Performed by: SURGERY

## 2017-07-23 PROCEDURE — 74011250636 HC RX REV CODE- 250/636: Performed by: PHYSICIAN ASSISTANT

## 2017-07-23 PROCEDURE — 74011250636 HC RX REV CODE- 250/636: Performed by: NURSE PRACTITIONER

## 2017-07-23 PROCEDURE — 74011250636 HC RX REV CODE- 250/636: Performed by: INTERNAL MEDICINE

## 2017-07-23 PROCEDURE — 74011636637 HC RX REV CODE- 636/637: Performed by: SURGERY

## 2017-07-23 PROCEDURE — 36415 COLL VENOUS BLD VENIPUNCTURE: CPT | Performed by: SURGERY

## 2017-07-23 PROCEDURE — 74011250636 HC RX REV CODE- 250/636: Performed by: PHYSICAL MEDICINE & REHABILITATION

## 2017-07-23 PROCEDURE — 3331090002 HH PPS REVENUE DEBIT

## 2017-07-23 PROCEDURE — 83735 ASSAY OF MAGNESIUM: CPT | Performed by: SURGERY

## 2017-07-23 PROCEDURE — 3331090001 HH PPS REVENUE CREDIT

## 2017-07-23 PROCEDURE — 85025 COMPLETE CBC W/AUTO DIFF WBC: CPT | Performed by: SURGERY

## 2017-07-23 PROCEDURE — 74011250636 HC RX REV CODE- 250/636: Performed by: EMERGENCY MEDICINE

## 2017-07-23 PROCEDURE — 74011000258 HC RX REV CODE- 258: Performed by: INTERNAL MEDICINE

## 2017-07-23 PROCEDURE — 65660000000 HC RM CCU STEPDOWN

## 2017-07-23 PROCEDURE — C9113 INJ PANTOPRAZOLE SODIUM, VIA: HCPCS | Performed by: SURGERY

## 2017-07-23 PROCEDURE — 80048 BASIC METABOLIC PNL TOTAL CA: CPT | Performed by: SURGERY

## 2017-07-23 PROCEDURE — 84100 ASSAY OF PHOSPHORUS: CPT | Performed by: SURGERY

## 2017-07-23 PROCEDURE — 74011250636 HC RX REV CODE- 250/636: Performed by: SURGERY

## 2017-07-23 PROCEDURE — 74011000258 HC RX REV CODE- 258: Performed by: SURGERY

## 2017-07-23 PROCEDURE — 82962 GLUCOSE BLOOD TEST: CPT

## 2017-07-23 RX ADMIN — SODIUM CHLORIDE 100 MG: 900 INJECTION, SOLUTION INTRAVENOUS at 18:48

## 2017-07-23 RX ADMIN — SODIUM CHLORIDE 40 MG: 9 INJECTION INTRAMUSCULAR; INTRAVENOUS; SUBCUTANEOUS at 08:58

## 2017-07-23 RX ADMIN — Medication 10 ML: at 14:25

## 2017-07-23 RX ADMIN — Medication 10 ML: at 05:37

## 2017-07-23 RX ADMIN — OCTREOTIDE ACETATE 50 MCG: 50 INJECTION, SOLUTION INTRAVENOUS; SUBCUTANEOUS at 22:48

## 2017-07-23 RX ADMIN — Medication: at 19:25

## 2017-07-23 RX ADMIN — SODIUM CHLORIDE, SODIUM LACTATE, POTASSIUM CHLORIDE, AND CALCIUM CHLORIDE 75 ML/HR: 600; 310; 30; 20 INJECTION, SOLUTION INTRAVENOUS at 09:15

## 2017-07-23 RX ADMIN — CLOPIDOGREL BISULFATE 75 MG: 75 TABLET ORAL at 08:58

## 2017-07-23 RX ADMIN — Medication: at 13:18

## 2017-07-23 RX ADMIN — HYDROMORPHONE HYDROCHLORIDE 1 MG: 1 INJECTION, SOLUTION INTRAMUSCULAR; INTRAVENOUS; SUBCUTANEOUS at 06:00

## 2017-07-23 RX ADMIN — ONDANSETRON 8 MG: 2 INJECTION INTRAMUSCULAR; INTRAVENOUS at 23:51

## 2017-07-23 RX ADMIN — VANCOMYCIN HYDROCHLORIDE 1250 MG: 10 INJECTION, POWDER, LYOPHILIZED, FOR SOLUTION INTRAVENOUS at 13:15

## 2017-07-23 RX ADMIN — CALCIUM GLUCONATE: 94 INJECTION, SOLUTION INTRAVENOUS at 18:53

## 2017-07-23 RX ADMIN — OCTREOTIDE ACETATE 50 MCG: 50 INJECTION, SOLUTION INTRAVENOUS; SUBCUTANEOUS at 08:58

## 2017-07-23 RX ADMIN — HUMAN INSULIN 2 UNITS: 100 INJECTION, SOLUTION SUBCUTANEOUS at 05:50

## 2017-07-23 RX ADMIN — ONDANSETRON 8 MG: 2 INJECTION INTRAMUSCULAR; INTRAVENOUS at 16:42

## 2017-07-23 RX ADMIN — HYDROMORPHONE HYDROCHLORIDE 1 MG: 1 INJECTION, SOLUTION INTRAMUSCULAR; INTRAVENOUS; SUBCUTANEOUS at 02:59

## 2017-07-23 RX ADMIN — Medication 0.76 MG: at 22:47

## 2017-07-23 RX ADMIN — Medication 0.76 MG: at 05:35

## 2017-07-23 RX ADMIN — OCTREOTIDE ACETATE 50 MCG: 50 INJECTION, SOLUTION INTRAVENOUS; SUBCUTANEOUS at 16:42

## 2017-07-23 RX ADMIN — ONDANSETRON 8 MG: 2 INJECTION INTRAMUSCULAR; INTRAVENOUS at 08:58

## 2017-07-23 RX ADMIN — Medication 0.76 MG: at 14:00

## 2017-07-23 RX ADMIN — ASPIRIN 81 MG CHEWABLE TABLET 81 MG: 81 TABLET CHEWABLE at 08:58

## 2017-07-23 RX ADMIN — PIPERACILLIN SODIUM,TAZOBACTAM SODIUM 3.38 G: 3; .375 INJECTION, POWDER, FOR SOLUTION INTRAVENOUS at 22:47

## 2017-07-23 RX ADMIN — Medication 10 ML: at 22:47

## 2017-07-23 RX ADMIN — PIPERACILLIN SODIUM,TAZOBACTAM SODIUM 3.38 G: 3; .375 INJECTION, POWDER, FOR SOLUTION INTRAVENOUS at 14:22

## 2017-07-23 RX ADMIN — Medication 10 ML: at 22:48

## 2017-07-23 RX ADMIN — PIPERACILLIN SODIUM,TAZOBACTAM SODIUM 3.38 G: 3; .375 INJECTION, POWDER, FOR SOLUTION INTRAVENOUS at 05:32

## 2017-07-23 NOTE — PROGRESS NOTES
Problem: Falls - Risk of  Goal: *Absence of falls  Outcome: Progressing Towards Goal  Pt has had no falls    Problem: Pressure Injury - Risk of  Goal: *Prevention of pressure ulcer  Outcome: Progressing Towards Goal  No signs of skin breakdown

## 2017-07-23 NOTE — PROGRESS NOTES
Subjective  Complains of pain     Temp:  [97.9 °F (36.6 °C)-99 °F (37.2 °C)]   Pulse (Heart Rate):  []   BP: ()/(40-89)   Resp Rate:  [14-20]   O2 Sat (%):  [94 %-100 %]   Weight:  [324 lb 1.2 oz (147 kg)-330 lb 11 oz (150 kg)]   07/23 0701 - 07/23 1900  In: 193.8 [I.V.:193.8]  Out: 5963 [Urine:1400; Drains:850]  07/21 1901 - 07/23 0700  In: 2256.8 [P.O.:120; I.V.:2136.8]  Out: 6330 [Urine:2950; Drains:3380]      Objective  Gen-Alert in NAD  Lungs- CTA  H-RRR  Abd- soft - there  Appears to be some enteric content under the vac wound  Other drains are unchanged,  There is decrease in the Left drain output. Active Problems:    Abdominal pain (6/25/2017)      Small bowel ischemia ([address] (6/28/2017)      Overview: CT abd/pelvis 6/28/17      1. The stomach and proximal small bowel loops are distended. There is a       loop of      small bowel in the midabdomen which is mildly dilated and does not       demonstrate      enhancement in the wall and there is suspicion of pneumatosis and this       leads to      a loop of small bowel which demonstrates thickening of the wall and       findings are      suspicious for ischemia. 2. There is extensive mesenteric edema and a small amount of ascites in       the      pelvis. 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 There is nonocclusive thrombus in the SMA. 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 Will assess healing and need for any type of changes in the dressing.

## 2017-07-23 NOTE — PROGRESS NOTES
Bedside shift change report given to Kandis Lindsay RN (oncoming nurse) by MAURI Aleman (offgoing nurse). Report included the following information SBAR, Intake/Output, Accordion and Cardiac Rhythm NSR/ST.

## 2017-07-23 NOTE — PROGRESS NOTES
Bedside shift change report given to April, RN (oncoming nurse) by Ari Urbina RN (offgoing nurse). Report included the following information SBAR, Kardex, Intake/Output, MAR, Recent Results and Cardiac Rhythm NSR.        Leeroy Long RN  6:43 AM

## 2017-07-24 LAB
ANION GAP BLD CALC-SCNC: 6 MMOL/L (ref 5–15)
BASOPHILS # BLD AUTO: 0 K/UL (ref 0–0.1)
BASOPHILS # BLD: 0 % (ref 0–1)
BUN SERPL-MCNC: 21 MG/DL (ref 6–20)
BUN/CREAT SERPL: 22 (ref 12–20)
CALCIUM SERPL-MCNC: 8.1 MG/DL (ref 8.5–10.1)
CHLORIDE SERPL-SCNC: 107 MMOL/L (ref 97–108)
CO2 SERPL-SCNC: 26 MMOL/L (ref 21–32)
CREAT SERPL-MCNC: 0.96 MG/DL (ref 0.55–1.02)
EOSINOPHIL # BLD: 0.4 K/UL (ref 0–0.4)
EOSINOPHIL NFR BLD: 4 % (ref 0–7)
ERYTHROCYTE [DISTWIDTH] IN BLOOD BY AUTOMATED COUNT: 16.5 % (ref 11.5–14.5)
GLUCOSE BLD STRIP.AUTO-MCNC: 103 MG/DL (ref 65–100)
GLUCOSE BLD STRIP.AUTO-MCNC: 112 MG/DL (ref 65–100)
GLUCOSE BLD STRIP.AUTO-MCNC: 113 MG/DL (ref 65–100)
GLUCOSE BLD STRIP.AUTO-MCNC: 134 MG/DL (ref 65–100)
GLUCOSE BLD STRIP.AUTO-MCNC: 165 MG/DL (ref 65–100)
GLUCOSE SERPL-MCNC: 127 MG/DL (ref 65–100)
HCT VFR BLD AUTO: 22.9 % (ref 35–47)
HGB BLD-MCNC: 7.2 G/DL (ref 11.5–16)
LYMPHOCYTES # BLD AUTO: 39 % (ref 12–49)
LYMPHOCYTES # BLD: 3.4 K/UL (ref 0.8–3.5)
MAGNESIUM SERPL-MCNC: 1.6 MG/DL (ref 1.6–2.4)
MCH RBC QN AUTO: 28.3 PG (ref 26–34)
MCHC RBC AUTO-ENTMCNC: 31.4 G/DL (ref 30–36.5)
MCV RBC AUTO: 90.2 FL (ref 80–99)
MONOCYTES # BLD: 1.2 K/UL (ref 0–1)
MONOCYTES NFR BLD AUTO: 13 % (ref 5–13)
NEUTS SEG # BLD: 3.7 K/UL (ref 1.8–8)
NEUTS SEG NFR BLD AUTO: 44 % (ref 32–75)
PHOSPHATE SERPL-MCNC: 3.6 MG/DL (ref 2.6–4.7)
PLATELET # BLD AUTO: 320 K/UL (ref 150–400)
POTASSIUM SERPL-SCNC: 3.9 MMOL/L (ref 3.5–5.1)
RBC # BLD AUTO: 2.54 M/UL (ref 3.8–5.2)
SERVICE CMNT-IMP: ABNORMAL
SODIUM SERPL-SCNC: 139 MMOL/L (ref 136–145)
WBC # BLD AUTO: 8.7 K/UL (ref 3.6–11)

## 2017-07-24 PROCEDURE — 85025 COMPLETE CBC W/AUTO DIFF WBC: CPT | Performed by: SURGERY

## 2017-07-24 PROCEDURE — 74011250637 HC RX REV CODE- 250/637: Performed by: SURGERY

## 2017-07-24 PROCEDURE — 77030018717 HC DRSG GRNUFM KCON -B

## 2017-07-24 PROCEDURE — 74011636637 HC RX REV CODE- 636/637: Performed by: SURGERY

## 2017-07-24 PROCEDURE — 74011250636 HC RX REV CODE- 250/636: Performed by: INTERNAL MEDICINE

## 2017-07-24 PROCEDURE — 74011250636 HC RX REV CODE- 250/636: Performed by: NURSE PRACTITIONER

## 2017-07-24 PROCEDURE — 82962 GLUCOSE BLOOD TEST: CPT

## 2017-07-24 PROCEDURE — 74011636637 HC RX REV CODE- 636/637: Performed by: NURSE PRACTITIONER

## 2017-07-24 PROCEDURE — 65660000000 HC RM CCU STEPDOWN

## 2017-07-24 PROCEDURE — 74011250636 HC RX REV CODE- 250/636: Performed by: PHYSICIAN ASSISTANT

## 2017-07-24 PROCEDURE — 74011250636 HC RX REV CODE- 250/636: Performed by: PHYSICAL MEDICINE & REHABILITATION

## 2017-07-24 PROCEDURE — 97606 NEG PRS WND THER DME>50 SQCM: CPT

## 2017-07-24 PROCEDURE — 74011000258 HC RX REV CODE- 258: Performed by: SURGERY

## 2017-07-24 PROCEDURE — 74011000258 HC RX REV CODE- 258: Performed by: NURSE PRACTITIONER

## 2017-07-24 PROCEDURE — 97116 GAIT TRAINING THERAPY: CPT

## 2017-07-24 PROCEDURE — 74011000250 HC RX REV CODE- 250: Performed by: SURGERY

## 2017-07-24 PROCEDURE — 36415 COLL VENOUS BLD VENIPUNCTURE: CPT | Performed by: SURGERY

## 2017-07-24 PROCEDURE — 84100 ASSAY OF PHOSPHORUS: CPT | Performed by: SURGERY

## 2017-07-24 PROCEDURE — 83735 ASSAY OF MAGNESIUM: CPT | Performed by: SURGERY

## 2017-07-24 PROCEDURE — 3331090002 HH PPS REVENUE DEBIT

## 2017-07-24 PROCEDURE — 3331090001 HH PPS REVENUE CREDIT

## 2017-07-24 PROCEDURE — 74011250636 HC RX REV CODE- 250/636: Performed by: SURGERY

## 2017-07-24 PROCEDURE — 74011000250 HC RX REV CODE- 250: Performed by: NURSE PRACTITIONER

## 2017-07-24 PROCEDURE — 74011250636 HC RX REV CODE- 250/636: Performed by: EMERGENCY MEDICINE

## 2017-07-24 PROCEDURE — 80048 BASIC METABOLIC PNL TOTAL CA: CPT | Performed by: SURGERY

## 2017-07-24 PROCEDURE — C9113 INJ PANTOPRAZOLE SODIUM, VIA: HCPCS | Performed by: SURGERY

## 2017-07-24 PROCEDURE — 74011000258 HC RX REV CODE- 258: Performed by: INTERNAL MEDICINE

## 2017-07-24 RX ADMIN — HYDROMORPHONE HYDROCHLORIDE 1 MG: 1 INJECTION, SOLUTION INTRAMUSCULAR; INTRAVENOUS; SUBCUTANEOUS at 06:11

## 2017-07-24 RX ADMIN — HYDROMORPHONE HYDROCHLORIDE 1 MG: 1 INJECTION, SOLUTION INTRAMUSCULAR; INTRAVENOUS; SUBCUTANEOUS at 17:14

## 2017-07-24 RX ADMIN — VANCOMYCIN HYDROCHLORIDE 1250 MG: 10 INJECTION, POWDER, LYOPHILIZED, FOR SOLUTION INTRAVENOUS at 13:36

## 2017-07-24 RX ADMIN — HYDROMORPHONE HYDROCHLORIDE 1 MG: 1 INJECTION, SOLUTION INTRAMUSCULAR; INTRAVENOUS; SUBCUTANEOUS at 10:05

## 2017-07-24 RX ADMIN — Medication 10 ML: at 13:40

## 2017-07-24 RX ADMIN — OCTREOTIDE ACETATE 50 MCG: 50 INJECTION, SOLUTION INTRAVENOUS; SUBCUTANEOUS at 16:07

## 2017-07-24 RX ADMIN — Medication 0.76 MG: at 06:11

## 2017-07-24 RX ADMIN — SODIUM CHLORIDE, SODIUM LACTATE, POTASSIUM CHLORIDE, AND CALCIUM CHLORIDE 75 ML/HR: 600; 310; 30; 20 INJECTION, SOLUTION INTRAVENOUS at 10:22

## 2017-07-24 RX ADMIN — Medication 10 ML: at 16:07

## 2017-07-24 RX ADMIN — HUMAN INSULIN 2 UNITS: 100 INJECTION, SOLUTION SUBCUTANEOUS at 06:10

## 2017-07-24 RX ADMIN — Medication 10 ML: at 06:11

## 2017-07-24 RX ADMIN — Medication 10 ML: at 22:51

## 2017-07-24 RX ADMIN — Medication 0.76 MG: at 22:50

## 2017-07-24 RX ADMIN — OCTREOTIDE ACETATE 50 MCG: 50 INJECTION, SOLUTION INTRAVENOUS; SUBCUTANEOUS at 08:53

## 2017-07-24 RX ADMIN — PIPERACILLIN SODIUM,TAZOBACTAM SODIUM 3.38 G: 3; .375 INJECTION, POWDER, FOR SOLUTION INTRAVENOUS at 13:33

## 2017-07-24 RX ADMIN — ONDANSETRON 8 MG: 2 INJECTION INTRAMUSCULAR; INTRAVENOUS at 16:07

## 2017-07-24 RX ADMIN — PIPERACILLIN SODIUM,TAZOBACTAM SODIUM 3.38 G: 3; .375 INJECTION, POWDER, FOR SOLUTION INTRAVENOUS at 06:10

## 2017-07-24 RX ADMIN — Medication: at 08:20

## 2017-07-24 RX ADMIN — I.V. FAT EMULSION 500 ML: 20 EMULSION INTRAVENOUS at 19:18

## 2017-07-24 RX ADMIN — Medication: at 20:02

## 2017-07-24 RX ADMIN — SODIUM CHLORIDE 100 MG: 900 INJECTION, SOLUTION INTRAVENOUS at 17:14

## 2017-07-24 RX ADMIN — ASPIRIN 81 MG CHEWABLE TABLET 81 MG: 81 TABLET CHEWABLE at 08:52

## 2017-07-24 RX ADMIN — Medication 0.76 MG: at 13:32

## 2017-07-24 RX ADMIN — SODIUM CHLORIDE 40 MG: 9 INJECTION INTRAMUSCULAR; INTRAVENOUS; SUBCUTANEOUS at 08:52

## 2017-07-24 RX ADMIN — PIPERACILLIN SODIUM,TAZOBACTAM SODIUM 3.38 G: 3; .375 INJECTION, POWDER, FOR SOLUTION INTRAVENOUS at 22:50

## 2017-07-24 RX ADMIN — Medication 10 ML: at 22:50

## 2017-07-24 RX ADMIN — Medication 10 ML: at 15:59

## 2017-07-24 RX ADMIN — CALCIUM GLUCONATE: 94 INJECTION, SOLUTION INTRAVENOUS at 19:19

## 2017-07-24 RX ADMIN — CLOPIDOGREL BISULFATE 75 MG: 75 TABLET ORAL at 08:52

## 2017-07-24 RX ADMIN — ONDANSETRON 8 MG: 2 INJECTION INTRAMUSCULAR; INTRAVENOUS at 10:04

## 2017-07-24 RX ADMIN — Medication: at 16:35

## 2017-07-24 NOTE — PROGRESS NOTES
General Surgery Daily Progress Note    Admit Date: 2017  Post-Operative Day: 11 Days Post-Op from Procedure(s) with comments:  SPECIAL PROCEDURE OUTSIDE OF OR - central line placement     Subjective:     Last 24 hrs: Pt c/o some nausea while vac being changed. Over weekend noted to have enteric contents around wound vac. Taking sips of clears. Objective:     Blood pressure 132/49, pulse 93, temperature 98.1 °F (36.7 °C), resp. rate 18, height 5' 4\" (1.626 m), weight 335 lb 5.1 oz (152.1 kg), SpO2 100 %, not currently breastfeeding. Temp (24hrs), Av.9 °F (37.2 °C), Min:98.1 °F (36.7 °C), Max:99.9 °F (37.7 °C)      _____________________  Physical Exam:     Alert and Oriented, x3, in no acute distress. Cardiovascular: RRR, no peripheral edema  Abdomen: R drain w/ mucousy material L drain w/ bilious material; wound w/ pink tissue and some enteric contents - drains in bowel; other drain lying in wound bed. See wound care note for full details. Assessment:   Active Problems:    Abdominal pain (2017)      Small bowel ischemia ([address] (2017)      Overview: CT abd/pelvis 17      1. The stomach and proximal small bowel loops are distended. There is a       loop of      small bowel in the midabdomen which is mildly dilated and does not       demonstrate      enhancement in the wall and there is suspicion of pneumatosis and this       leads to      a loop of small bowel which demonstrates thickening of the wall and       findings are      suspicious for ischemia. 2. There is extensive mesenteric edema and a small amount of ascites in       the      pelvis. 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 There is nonocclusive thrombus in the SMA.             Enterocutaneous fistula (2017)            Plan:     Renew TPN  Wound vac chg Mon and Th - watch for leakage of contents  Cont sips of clears  Pain mgmt  Cont gi proph  Cont plavix  Cont octreotide  Cont abx  Am labs    Data Review:    Recent Labs      07/24/17   0309  07/23/17   0240  07/22/17   0220   WBC  8.7  9.6  10.6   HGB  7.2*  7.7*  8.1*   HCT  22.9*  24.1*  25.7*   PLT  320  352  376     Recent Labs      07/24/17   0309  07/23/17   0240  07/22/17   0220   NA  139  139  137   K  3.9  3.9  3.9   CL  107  107  106   CO2  26  25  23   GLU  127*  112*  119*   BUN  21*  24*  25*   CREA  0.96  1.00  1.07*   CA  8.1*  8.1*  8.6   MG  1.6  1.7  2.0   PHOS  3.6  3.9  4.4     No results for input(s): AML, LPSE in the last 72 hours. ______________________  Medications:    Current Facility-Administered Medications   Medication Dose Route Frequency    [START ON 7/25/2017] vancomycin trough 7/25 @ 13:00 - Thanks!    Other ONCE    TPN ADULT - CENTRAL   IntraVENous TITRATE    acetaminophen (TYLENOL) tablet 650 mg  650 mg Oral Q4H PRN    LORazepam (ATIVAN) injection 0.76 mg  0.76 mg IntraVENous Q8H    albuterol (PROVENTIL VENTOLIN) nebulizer solution 2.5 mg  2.5 mg Inhalation Q4H PRN    octreotide (SANDOSTATIN) injection 50 mcg  50 mcg IntraVENous TID    vancomycin (VANCOCIN) 1250 mg in  ml infusion  1,250 mg IntraVENous Q24H    clopidogrel (PLAVIX) tablet 75 mg  75 mg Oral DAILY    sodium chloride (NS) flush 10 mL  10 mL InterCATHeter Q24H    sodium chloride (NS) flush 10 mL  10 mL InterCATHeter PRN    sodium chloride (NS) flush 10-40 mL  10-40 mL InterCATHeter Q8H    alteplase (CATHFLO) 1 mg in sterile water (preservative free) 1 mL injection  1 mg InterCATHeter PRN    bacitracin 500 unit/gram packet 1 Packet  1 Packet Topical PRN    0.9% sodium chloride infusion 250 mL  250 mL IntraVENous PRN    lactated Ringers infusion  75 mL/hr IntraVENous CONTINUOUS    fat emulsion 20% (LIPOSYN, INTRALIPID) infusion 500 mL  500 mL IntraVENous Q MON, WED & FRI    HYDROmorphone (PF) (DILAUDID) injection 1 mg  1 mg IntraVENous Q1H PRN    aspirin chewable tablet 81 mg  81 mg Oral DAILY    insulin regular (NOVOLIN R, HUMULIN R) injection   SubCUTAneous Q6H    morphine (PF)  mg/30 ml   IntraVENous CONTINUOUS    glucose chewable tablet 16 g  4 Tab Oral PRN    glucagon (GLUCAGEN) injection 1 mg  1 mg IntraMUSCular PRN    dextrose 10 % infusion 125-250 mL  125-250 mL IntraVENous PRN    pantoprazole (PROTONIX) 40 mg in sodium chloride 0.9 % 10 mL injection  40 mg IntraVENous DAILY    prochlorperazine (COMPAZINE) with saline injection 5 mg  5 mg IntraVENous Q6H PRN    anidulafungin (ERAXIS) 100 mg in 0.9% sodium chloride 130 mL IVPB  100 mg IntraVENous Q24H    ondansetron (ZOFRAN) injection 8 mg  8 mg IntraVENous Q6H PRN    sodium chloride (NS) flush 5-10 mL  5-10 mL IntraVENous Q8H    sodium chloride (NS) flush 5-10 mL  5-10 mL IntraVENous PRN    naloxone (NARCAN) injection 0.4 mg  0.4 mg IntraVENous PRN    diphenhydrAMINE (BENADRYL) injection 12.5 mg  12.5 mg IntraVENous Q4H PRN    piperacillin-tazobactam (ZOSYN) 3.375 g in 0.9% sodium chloride (MBP/ADV) 100 mL  3.375 g IntraVENous Q8H    Vancomycin ---Pharmacy to Dose   Other Rx Dosing/Monitoring       Gianni Rosen NP  7/24/2017    I have independently examined the patient and have reviewed the chart. I agree with the above plan. [place] changed today, [place] area seems to be getting a little smaller. Cont TPN.     Kashmir Donaldson MD

## 2017-07-24 NOTE — WOUND CARE
WOCN Note:     Follow-up visit for leaking VAC seal with full dressing change. Seen with Haley Perez NP, today. Chart shows:  Admitted for nausea, vomiting, and abdominal pain; history of Crohn's, DVT, bowel perforation with surgery 6/7/17. Exploratory lap, KATHRINE, fistula exclusion and tube placement by Dr. Waldemar Braga 7/7/17. On zosyn & Vancomycin      Assessment:   Patient is A&O x 4 and mobile. Bed: total care bariatric with air mattress   Diet: TPN with some clears  Pure Wick in use & draining clear yellow. Tolerates VAC change fairly well but was pre-medicated by RN with IV pain med.       Mucus fistula LLQ: red & Moist and almost at skin level; some mucosal transplantation noted; output is light green mucus. Activelife flat pouch applied.       1. Midline abdominal incision with three drains visible in the base = 20 x 12 x 4.6 cm  Ana. Base is 30% moist red mucosal tissue with peristalsis that reaches the left side drain insertion, 10% scattered moist yellow 40% moist pink with some mucus, 20% granular walls up to margin. ~150cc lisa exudate in cannister and ~ 150cc in wall suction. RUQ drain to bedside bag clogged with yellow mucus. LUQ drain to bedside bag with green effluent. RLQ drain with end freely floating in wound to wall suction. It is sandwiched between the white and black sponge in wound. Periwound intact & without erythema. Dressed using 2 pieces of petroleum gauze as base layer over most of wound bed, 1 white sponge (RLQ drain rests on top of white sponge), 1 black sponge. Suction is achieved with wall suction prior to track pad placement but track pad was placed in case the other drain clogs off. [employer] VAC set to 25mmHg continuous suction. Recommendations:    Continue VAC therapy as ordered @ 25 mmHg continuous suction.      Discussed above plan with patient & RN.      Transition of Care: Plan to follow as needed while admitted to hospital with VAC changes on Tuesdays and Fridays.      YOLANDA BaptisteN, RN, [facility]  Certified Wound, Ostomy, Continence Nurse  office [phone]  pager [phone] or call  to page

## 2017-07-24 NOTE — PROGRESS NOTES
Problem: Mobility Impaired (Adult and Pediatric)  Goal: *Acute Goals and Plan of Care (Insert Text)    Physical Therapy Goals  Revised 7/20/2017  1. Patient will move from supine to sit and sit to supine , scoot up and down and roll side to side in bed with independence within 7 day(s). 2. Patient will transfer from bed to chair and chair to bed with supervision/set-up using the least restrictive device within 7 day(s). 3. Patient will perform sit to stand with supervision/set-up within 7 day(s). 4. Patient will ambulate with supervision/set-up for 200 feet with the least restrictive device within 7 day(s). 5. Patient will ascend/descend 7 stairs with one handrail(s) with supervision/set-up within 7 day(s). Physical Therapy Goals  7/12/2017  1. Patient will move from supine to sit and sit to supine , scoot up and down and roll side to side in bed with independence within 7 day(s). 2. Patient will transfer from bed to chair and chair to bed with supervision/set-up using the least restrictive device within 7 day(s). 3. Patient will perform sit to stand with supervision/set-up within 7 day(s). 4. Patient will ambulate with supervision/set-up for 50 feet with the least restrictive device within 7 day(s). PHYSICAL THERAPY TREATMENT  Patient: Sweta Carpio (46 y.o. female)  Date: 7/24/2017  Diagnosis: N/V intractable post-opt pain  Abdominal pain  poor iv access  DEAD BOWEL  SMALL BOWEL OBSTRUCTION  central line placement  Abdominal pain <principal problem not specified>  Procedure(s) (LRB):  SPECIAL PROCEDURE OUTSIDE OF OR (N/A) 11 Days Post-Op  Precautions:        ASSESSMENT:  Pt emotional today but willing to participate in therapy. Wound Vac changed this morning and pt reports increased discomfort since then. Sitting up in chair. Pt quickly rises and remains standing while PT gathered lines and leads. Pt stable without outside assist. Pt attached to suction and draining throughout ambulation.  Kept gait distance to length of tubing. Ambulated forwards, backwards, and lateral side stepping in room. Pt holding on IV pole for stability and confidence as she was worried something would come out if she wasn't holding it. Returned to sitting in chair and educated on LE exercises to do during the day. Goal to ambulate to hallway if suction can be paused tomorrow. Will continue to follow and assess for needs. Pt will be in hospital at least until end of week. Progression toward goals:  [X]    Improving appropriately and progressing toward goals  [ ]    Improving slowly and progressing toward goals  [ ]    Not making progress toward goals and plan of care will be adjusted       PLAN:  Patient continues to benefit from skilled intervention to address the above impairments. Continue treatment per established plan of care. Discharge Recommendations:  Rehab vs HHPT  Further Equipment Recommendations for Discharge:  None       SUBJECTIVE:   Patient stated This thing is moving. It hasn't moved all day. That's good.  regarding fluid in suction tube      OBJECTIVE DATA SUMMARY:   Critical Behavior:  Neurologic State: Alert  Orientation Level: Oriented X4  Cognition: Follows commands     Functional Mobility Training:  Bed Mobility:                    Transfers:  Sit to Stand: Stand-by asssistance  Stand to Sit: Stand-by asssistance                             Balance:  Sitting: Intact  Standing: Intact  Standing - Static: Good  Standing - Dynamic : Good  Ambulation/Gait Training:  Distance (ft): 100 Feet (ft) (10x10 feet)  Assistive Device: Gait belt  Ambulation - Level of Assistance: Contact guard assistance        Gait Abnormalities: Antalgic;Decreased step clearance        Base of Support: Widened     Speed/Liz: Pace decreased (<100 feet/min)  Step Length: Right shortened;Left shortened                             Activity Tolerance:   Good  Please refer to the flowsheet for vital signs taken during this treatment.   After treatment:   [X]    Patient left in no apparent distress sitting up in chair  [ ]    Patient left in no apparent distress in bed  [X]    Call bell left within reach  [X]    Nursing notified  [ ]    Caregiver present  [ ]    Bed alarm activated      COMMUNICATION/COLLABORATION:   The patients plan of care was discussed with: Registered Nurse         Anila Fernandez, PT   Time Calculation: 17 mins

## 2017-07-24 NOTE — PROGRESS NOTES
Bedside shift change report given to April Maria RN (oncoming nurse) by MAURI Aleman (offgoing nurse). Report included the following information SBAR, MAR, Accordion and Cardiac Rhythm NSR/ST.

## 2017-07-24 NOTE — PROGRESS NOTES
ID Progress Note  2017    Subjective:     Seems to be having an ok day--walked some today and now up in chair    Objective:     Antibiotics:  1. Vancomycin  2. Zosyn   3. Eraxis      Vitals:   Visit Vitals    /74 (BP 1 Location: Right arm, BP Patient Position: At rest)    Pulse 96    Temp 97.9 °F (36.6 °C)    Resp 20    Ht 5' 4\" (1.626 m)    Wt 152.1 kg (335 lb 5.1 oz)    SpO2 99%    Breastfeeding No    BMI 57.56 kg/m2        Tmax:  Temp (24hrs), Av.7 °F (37.1 °C), Min:97.9 °F (36.6 °C), Max:99.9 °F (37.7 °C)      Exam:  Lungs:  clear to auscultation bilaterally  Heart:  regularly irregular rhythm  Abdomen:  abnormal findings:  tenderness moderate in the entire abdomen, hypoactive bowel sounds    Labs:      Recent Labs      17   0309  17   0240  17   0220   WBC  8.7  9.6  10.6   HGB  7.2*  7.7*  8.1*   PLT  320  352  376   BUN  21*  24*  25*   CREA  0.96  1.00  1.07*       Cultures:     Lab Results   Component Value Date/Time    Specimen Description: URINE 2009 07:45 PM     Lab Results   Component Value Date/Time    Culture result: NO GROWTH ON SOLID MEDIA AT 4 DAYS 2017 12:17 PM    Culture result:  2017 12:17 PM     **METHICILLIN RESISTANT STAPHYLOCOCCUS AUREUS**  ISOLATED FROM THIO BROTH ONLY      Culture result: NO GROWTH 5 DAYS 2017 02:35 PM       Radiology:     Line/Insert Date:           Assessment:     1. Ischemic gut with frozen abdomen  2. Leukocytosis--back up after surgery--trending back down overall--down to normal  3. Cultures negative to date    Objective:     1. Continue IV therapy--will start to remove antibiotics this week if no objections from other attendings  2. Follow up cultures and studies  3.  Work up fevers    Bo Chilel MD

## 2017-07-24 NOTE — PROGRESS NOTES
Bedside shift change report given to [facility]' RN (oncoming nurse) by Pura Villanueva (offgoing nurse). Report included the following information SBAR, Intake/Output, MAR, Recent Results and Cardiac Rhythm NSR.

## 2017-07-24 NOTE — PROGRESS NOTES
Problem: Falls - Risk of  Goal: *Absence of falls  Outcome: Progressing Towards Goal  Pt. bedrails up x3, call bell within reach, gripper socks on  Goal: *Knowledge of fall prevention  Outcome: Progressing Towards Goal  Pt. Calls out appropriately     Problem: Pressure Injury - Risk of  Goal: *Prevention of pressure ulcer  Outcome: Progressing Towards Goal  Pt. Is on turn team q2    Problem: Surgical Pathway Post-Op Day 2 through Discharge  Goal: Activity/Safety  Outcome: Progressing Towards Goal  Pt. Is up to chair and has ambulated in room with PT tolerated well  Goal: Nutrition/Diet  Outcome: Progressing Towards Goal  Pt. Is NPO  Goal: Respiratory  Outcome: Progressing Towards Goal  Pt. Is on RA using IS  Goal: *Optimal pain control at patients stated goal  Outcome: Progressing Towards Goal  Pt. Has morphine PCA and IV dilaudid for break through meds  Goal: *Hemodynamically stable  Outcome: Progressing Towards Goal  VS stable   Goal: *Tolerating diet  Outcome: Progressing Towards Goal  Pt. Is NPO     Problem: Pain  Goal: *Control of Pain  Outcome: Progressing Towards Goal  Pt. Has Palliative care assigned to them.  Pt. Has a Morphine PCA and has IV dilaudid q 1 as needed

## 2017-07-24 NOTE — PROGRESS NOTES
Bedside and Verbal shift change report given to tiana (oncoming nurse) by Kamilla Bates (offgoing nurse). Report included the following information SBAR, Kardex, Procedure Summary, Intake/Output, MAR, Recent Results and Cardiac Rhythm sinus tach.

## 2017-07-24 NOTE — PROGRESS NOTES
Palliative Medicine Consult  [place]: [phone] ([phone])    Patient Name: Casimiro Bowman  YOB: 1975    Date of Initial Consult: 6/27/17  Reason for Consult: overwhelming symptoms  Requesting Provider: Christina Altman NP  Primary Care Physician: Shikha Rainey MD      SUMMARY:   Casimiro Bowman is a 46 y.o. with a past history of Crohn's disease, anxiety, chronic pain, hx DVT,multiple (4) small bowel resections, s/p recent urgent dx lap, with lysis of adhesions, repair of suspected perf 6/7/17 , who was admitted on 6/25/2017 from home with a diagnosis of worsening abdominal pain, elevated WBC and MRSA wound infection. Initially consulted for pain management thought to be possible Crohn's flare. Initial CT on 6/25 w/out acute findings but repeat on 6/28 showing ischemic bowl. S/p ex lap but ischemic bowl unresectable, s/p SMA stent. Extubated on 6/30. Most recently s/p ex lap, KATHRINE, feeding tube, fistula exclusion and wound vac placement for EC fistula. Remains on TPN, NPO w/ sips. Patient is followed chronically for pain management by Dr. Declan Nunes at [address] [place] [address]. Home regimen for chronic abdominal pain is MS Contin 60 Q8 and MS IR 30 Q6 PRN. She also takes Xanax 1mg TID prn anxiety.  reviewed, opioids last prescribed on 5/19/17. PALLIATIVE DIAGNOSES:     1. Abdominal pain, generalized  2. Anxiety  3. Nausea  4. Debility   5. Crohn's disease  6. Chronic constipation (at home on Relistor)  7. MRSA wound        PLAN:   1. Patient appears comfortable, PCA allows her to get OOB some and work w/ therapies. No adverse effects. 2. Continue Morphine PCA 4mg/h basal, 5mg every 10 min demand. Pt feels that Morphine works better than Dilaudid (at equivalent doses). 3. Cont prn Dilaudid 1mg IV every 1h prn pain that is not controlled by PCA- hortencia before any dressing changes  4.  Will cont to follow for PCA management, although if stable may not see daily- please reach out for any concerns. Palliative  following for support. 5. Discussed that recommend changing to po regimen slowly- this will take place per surgery, or when can take bites of food. 6. If pt leaves hospital on TPN, will discuss transition to scheduled liquid meds versus trying the MSContin (concern that may cause nausea). Will speak to surgery team when this time comes. 7. Bowel regimen per surgical team.  8. Communicated plan of care with: Palliative IDT     GOALS OF CARE / TREATMENT PREFERENCES:   [====Goals of Care====]  GOALS OF CARE:  Patient / health care proxy stated goals: pain control  Recovery from acute issues      TREATMENT PREFERENCES:   Code Status: Full Code    Advance Care Planning:  Advance Care Planning 6/26/2017   Patient's Healthcare Decision Maker is: Legal Next of Camilo Jiménez   Primary Decision Maker Name Tee Christian   Primary Decision Maker Phone Number [phone]   Primary Decision Maker Relationship to Patient Parent   Confirm Advance Directive None   Patient Would Like to Complete Advance Directive No       Other:    The palliative care team has discussed with patient / health care proxy about goals of care / treatment preferences for patient.  [====Goals of Care====]         HISTORY:     Reviewed vitals, I/O's, labs, imaging, MAR, notes. HPI/SUBJECTIVE:    The patient is:   [x] Verbal and participatory  [] Non-participatory due to: Medical condition     Pt reports abd pain 7 / 10, had wound care change wound vac today. Had some leakage over weekend. Tearful, trying to remain patient with her recovery.      Clinical Pain Assessment (nonverbal scale for severity on nonverbal patients):   [++++ Clinical Pain Assessment++++]  [++++Pain Severity++++]: Pain: 7  [++++Pain Character++++]: sharp and burning, stabbing  [++++Pain Duration++++]: several days  [++++Pain Effect++++]:  feeling anxious  [++++Pain Factors++++]: better after pain medicaion  [++++Pain Frequency++++]: constant  [++++Pain Location++++]: across abdomen both sides in middle at incision site  [++++ Clinical Pain Assessment++++]     FUNCTIONAL ASSESSMENT:     Palliative Performance Scale (PPS):  PPS: 40       PSYCHOSOCIAL/SPIRITUAL SCREENING:     Advance Care Planning:  Advance Care Planning 6/26/2017   Patient's Healthcare Decision Maker is: Legal Next of Camilo Jiménez   Primary Decision Maker Name Radha Brennan   Primary Decision Maker Phone Number [phone]   Primary Decision Maker Relationship to Patient Parent   Confirm Advance Directive None   Patient Would Like to Complete Advance Directive No        Any spiritual / Congregational concerns:  [] Yes /  [x] No    Caregiver Burnout:  [] Yes /  [x] No /  [] No Caregiver Present      Anticipatory grief assessment:   [x] Normal  / [] Maladaptive       ESAS Anxiety: Anxiety: 3    ESAS Depression: Depression: 2        REVIEW OF SYSTEMS:     Positive and pertinent negative findings in ROS are noted above in HPI. The following systems were [x] reviewed / [] unable to be reviewed as noted in HPI  Other findings are noted below. Systems: constitutional, ears/nose/mouth/throat, respiratory, gastrointestinal, genitourinary, musculoskeletal, integumentary, neurologic, psychiatric, endocrine. Positive findings noted below. Modified ESAS Completed by: provider   Fatigue: 4 Drowsiness: 0   Depression: 2 Pain: 7   Anxiety: 3 Nausea: 0   Anorexia: 0 Dyspnea: 0     Constipation: No              PHYSICAL EXAM:     From RN flowsheet:  Wt Readings from Last 3 Encounters:   07/24/17 335 lb 5.1 oz (152.1 kg)   06/25/17 343 lb (155.6 kg)   06/22/17 343 lb (155.6 kg)     Blood pressure 126/58, pulse 97, temperature 98.1 °F (36.7 °C), resp. rate 18, height 5' 4\" (1.626 m), weight 335 lb 5.1 oz (152.1 kg), SpO2 97 %, not currently breastfeeding.     Pain Scale 1: Numeric (0 - 10)  Pain Intensity 1: 6  Pain Onset 1: chronic  Pain Location 1: Abdomen  Pain Orientation 1: Anterior  Pain Description 1: Aching  Pain Intervention(s) 1: Encouraged PCA  Last bowel movement, if known: stool in ostomy     Constitutional:awake, alert, oriented, nad  Eyes: pupils equal, anicteric  ENMT: no nasal discharge, moist mucous membranes  Cardiac: regular rhythm   Respiratory: breathing not labored  Gastrointestinal: midline incision w/ wound vac no leakage,  b/l wounds, hypoactive bowel sounds   Musculoskeletal: no deformity, no tenderness to palpation  Skin: warm, dry, no edema  Neurologic: moving all extremities  Psych: no agitation, restricted affect, no hallucinations, tearful a bit     HISTORY:     Active Problems:    Abdominal pain (6/25/2017)      Small bowel ischemia ([address] (6/28/2017)      Overview: CT abd/pelvis 6/28/17      1. The stomach and proximal small bowel loops are distended. There is a       loop of      small bowel in the midabdomen which is mildly dilated and does not       demonstrate      enhancement in the wall and there is suspicion of pneumatosis and this       leads to      a loop of small bowel which demonstrates thickening of the wall and       findings are      suspicious for ischemia. 2. There is extensive mesenteric edema and a small amount of ascites in       the      pelvis. 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 There is nonocclusive thrombus in the SMA.             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)      Past Medical History:   Diagnosis Date    Crohn's disease ([address] 8/15/2011    DVT (deep venous thrombosis) ([address] 8/15/2011    Incarcerated ventral hernia 8/15/2011    Right flank pain 8/22/2011    Seizures ([address]     Stroke [facility])       Past Surgical History:   Procedure Laterality Date    HX OTHER SURGICAL      multiple procedures related to her Crohn's disease    RI LAP, INCISIONAL HERNIA REPAIR,INCARCERATED  9-10-08    dr. Allen Zhou      Family History   Problem Relation Age of Onset    Hypertension Father     Stroke Father    Jovilali Igor Other Father      arthritis      History reviewed, no pertinent family history. Social History   Substance Use Topics    Smoking status: Former Smoker    Smokeless tobacco: Not on file    Alcohol use No     Allergies   Allergen Reactions    Demerol [Meperidine] Unknown (comments)    Soma [Carisoprodol] Rash and Nausea Only    Sulfa (Sulfonamide Antibiotics) Unknown (comments)    Toradol [Ketorolac Tromethamine] Unknown (comments)      Current Facility-Administered Medications   Medication Dose Route Frequency    [START ON 7/25/2017] vancomycin trough 7/25 @ 13:00 - Thanks!    Other ONCE    TPN ADULT - CENTRAL   IntraVENous TITRATE    acetaminophen (TYLENOL) tablet 650 mg  650 mg Oral Q4H PRN    LORazepam (ATIVAN) injection 0.76 mg  0.76 mg IntraVENous Q8H    albuterol (PROVENTIL VENTOLIN) nebulizer solution 2.5 mg  2.5 mg Inhalation Q4H PRN    octreotide (SANDOSTATIN) injection 50 mcg  50 mcg IntraVENous TID    vancomycin (VANCOCIN) 1250 mg in  ml infusion  1,250 mg IntraVENous Q24H    clopidogrel (PLAVIX) tablet 75 mg  75 mg Oral DAILY    sodium chloride (NS) flush 10 mL  10 mL InterCATHeter Q24H    sodium chloride (NS) flush 10 mL  10 mL InterCATHeter PRN    sodium chloride (NS) flush 10-40 mL  10-40 mL InterCATHeter Q8H    alteplase (CATHFLO) 1 mg in sterile water (preservative free) 1 mL injection  1 mg InterCATHeter PRN    bacitracin 500 unit/gram packet 1 Packet  1 Packet Topical PRN    0.9% sodium chloride infusion 250 mL  250 mL IntraVENous PRN    lactated Ringers infusion  75 mL/hr IntraVENous CONTINUOUS    fat emulsion 20% (LIPOSYN, INTRALIPID) infusion 500 mL  500 mL IntraVENous Q MON, WED & FRI    HYDROmorphone (PF) (DILAUDID) injection 1 mg  1 mg IntraVENous Q1H PRN    aspirin chewable tablet 81 mg  81 mg Oral DAILY    insulin regular (NOVOLIN R, HUMULIN R) injection   SubCUTAneous Q6H    morphine (PF)  mg/30 ml   IntraVENous CONTINUOUS    glucose chewable tablet 16 g  4 Tab Oral PRN    glucagon (GLUCAGEN) injection 1 mg  1 mg IntraMUSCular PRN    dextrose 10 % infusion 125-250 mL  125-250 mL IntraVENous PRN    pantoprazole (PROTONIX) 40 mg in sodium chloride 0.9 % 10 mL injection  40 mg IntraVENous DAILY    prochlorperazine (COMPAZINE) with saline injection 5 mg  5 mg IntraVENous Q6H PRN    anidulafungin (ERAXIS) 100 mg in 0.9% sodium chloride 130 mL IVPB  100 mg IntraVENous Q24H    ondansetron (ZOFRAN) injection 8 mg  8 mg IntraVENous Q6H PRN    sodium chloride (NS) flush 5-10 mL  5-10 mL IntraVENous Q8H    sodium chloride (NS) flush 5-10 mL  5-10 mL IntraVENous PRN    naloxone (NARCAN) injection 0.4 mg  0.4 mg IntraVENous PRN    diphenhydrAMINE (BENADRYL) injection 12.5 mg  12.5 mg IntraVENous Q4H PRN    piperacillin-tazobactam (ZOSYN) 3.375 g in 0.9% sodium chloride (MBP/ADV) 100 mL  3.375 g IntraVENous Q8H    Vancomycin ---Pharmacy to Dose   Other Rx Dosing/Monitoring          LAB AND IMAGING FINDINGS:     Lab Results   Component Value Date/Time    WBC 8.7 07/24/2017 03:09 AM    HGB 7.2 07/24/2017 03:09 AM    PLATELET 567 98/72/5552 03:09 AM     Lab Results   Component Value Date/Time    Sodium 139 07/24/2017 03:09 AM    Potassium 3.9 07/24/2017 03:09 AM    Chloride 107 07/24/2017 03:09 AM    CO2 26 07/24/2017 03:09 AM    BUN 21 07/24/2017 03:09 AM    Creatinine 0.96 07/24/2017 03:09 AM    Calcium 8.1 07/24/2017 03:09 AM    Magnesium 1.6 07/24/2017 03:09 AM    Phosphorus 3.6 07/24/2017 03:09 AM      Lab Results   Component Value Date/Time    AST (SGOT) 11 07/08/2017 04:42 AM    Alk.  phosphatase 120 07/08/2017 04:42 AM    Protein, total 7.0 07/08/2017 04:42 AM    Albumin 1.5 07/08/2017 04:42 AM    Globulin 5.5 07/08/2017 04:42 AM     No results found for: INR, PTMR, PTP, PT1, PT2, APTT   No results found for: IRON, FE, TIBC, IBCT, PSAT, FERR   No results found for: PH, PCO2, PO2  No components found for: GLPOC   No results found for: CPK, CKMB Total time:    Counseling / coordination time, spent as noted above:    > 50% counseling / coordination?:     Prolonged service was provided for  []30 min   []75 min in face to face time in the presence of the patient, spent as noted above. Time Start:   Time End:   Note: this can only be billed with 13826 (initial) or 60545 (follow up). If multiple start / stop times, list each separately.

## 2017-07-25 LAB
ANION GAP BLD CALC-SCNC: 7 MMOL/L (ref 5–15)
BASOPHILS # BLD AUTO: 0.1 K/UL (ref 0–0.1)
BASOPHILS # BLD: 1 % (ref 0–1)
BUN SERPL-MCNC: 20 MG/DL (ref 6–20)
BUN/CREAT SERPL: 20 (ref 12–20)
CALCIUM SERPL-MCNC: 8 MG/DL (ref 8.5–10.1)
CHLORIDE SERPL-SCNC: 108 MMOL/L (ref 97–108)
CO2 SERPL-SCNC: 24 MMOL/L (ref 21–32)
CREAT SERPL-MCNC: 1.02 MG/DL (ref 0.55–1.02)
DATE LAST DOSE: ABNORMAL
DIFFERENTIAL METHOD BLD: ABNORMAL
EOSINOPHIL # BLD: 0.8 K/UL (ref 0–0.4)
EOSINOPHIL NFR BLD: 9 % (ref 0–7)
ERYTHROCYTE [DISTWIDTH] IN BLOOD BY AUTOMATED COUNT: 16.6 % (ref 11.5–14.5)
GLUCOSE BLD STRIP.AUTO-MCNC: 121 MG/DL (ref 65–100)
GLUCOSE BLD STRIP.AUTO-MCNC: 129 MG/DL (ref 65–100)
GLUCOSE BLD STRIP.AUTO-MCNC: 140 MG/DL (ref 65–100)
GLUCOSE BLD STRIP.AUTO-MCNC: 94 MG/DL (ref 65–100)
GLUCOSE SERPL-MCNC: 116 MG/DL (ref 65–100)
HCT VFR BLD AUTO: 22.5 % (ref 35–47)
HGB BLD-MCNC: 7.2 G/DL (ref 11.5–16)
LYMPHOCYTES # BLD AUTO: 44 % (ref 12–49)
LYMPHOCYTES # BLD: 3.9 K/UL (ref 0.8–3.5)
MAGNESIUM SERPL-MCNC: 1.7 MG/DL (ref 1.6–2.4)
MCH RBC QN AUTO: 29.3 PG (ref 26–34)
MCHC RBC AUTO-ENTMCNC: 32 G/DL (ref 30–36.5)
MCV RBC AUTO: 91.5 FL (ref 80–99)
MONOCYTES # BLD: 0.5 K/UL (ref 0–1)
MONOCYTES NFR BLD AUTO: 6 % (ref 5–13)
MYELOCYTES NFR BLD MANUAL: 1 %
NEUTS BAND NFR BLD MANUAL: 1 % (ref 0–6)
NEUTS SEG # BLD: 3.5 K/UL (ref 1.8–8)
NEUTS SEG NFR BLD AUTO: 38 % (ref 32–75)
NRBC # BLD: 0 K/UL (ref 0–0.01)
NRBC BLD-RTO: 0 PER 100 WBC
PHOSPHATE SERPL-MCNC: 3.6 MG/DL (ref 2.6–4.7)
PLATELET # BLD AUTO: 314 K/UL (ref 150–400)
POTASSIUM SERPL-SCNC: 3.6 MMOL/L (ref 3.5–5.1)
RBC # BLD AUTO: 2.46 M/UL (ref 3.8–5.2)
RBC MORPH BLD: ABNORMAL
REPORTED DOSE,DOSE: ABNORMAL UNITS
REPORTED DOSE/TIME,TMG: ABNORMAL
SERVICE CMNT-IMP: ABNORMAL
SERVICE CMNT-IMP: NORMAL
SODIUM SERPL-SCNC: 139 MMOL/L (ref 136–145)
VANCOMYCIN TROUGH SERPL-MCNC: 10.2 UG/ML (ref 5–10)
WBC # BLD AUTO: 8.9 K/UL (ref 3.6–11)
WBC MORPH BLD: ABNORMAL

## 2017-07-25 PROCEDURE — 74011250636 HC RX REV CODE- 250/636: Performed by: PHYSICAL MEDICINE & REHABILITATION

## 2017-07-25 PROCEDURE — 3331090002 HH PPS REVENUE DEBIT

## 2017-07-25 PROCEDURE — 65660000000 HC RM CCU STEPDOWN

## 2017-07-25 PROCEDURE — 83735 ASSAY OF MAGNESIUM: CPT | Performed by: SURGERY

## 2017-07-25 PROCEDURE — 97606 NEG PRS WND THER DME>50 SQCM: CPT

## 2017-07-25 PROCEDURE — 74011250636 HC RX REV CODE- 250/636: Performed by: INTERNAL MEDICINE

## 2017-07-25 PROCEDURE — 74011000258 HC RX REV CODE- 258: Performed by: INTERNAL MEDICINE

## 2017-07-25 PROCEDURE — 80048 BASIC METABOLIC PNL TOTAL CA: CPT | Performed by: SURGERY

## 2017-07-25 PROCEDURE — 74011636637 HC RX REV CODE- 636/637: Performed by: SURGERY

## 2017-07-25 PROCEDURE — 85025 COMPLETE CBC W/AUTO DIFF WBC: CPT | Performed by: SURGERY

## 2017-07-25 PROCEDURE — C9113 INJ PANTOPRAZOLE SODIUM, VIA: HCPCS | Performed by: SURGERY

## 2017-07-25 PROCEDURE — 74011250636 HC RX REV CODE- 250/636: Performed by: NURSE PRACTITIONER

## 2017-07-25 PROCEDURE — 74011000250 HC RX REV CODE- 250: Performed by: SURGERY

## 2017-07-25 PROCEDURE — 82962 GLUCOSE BLOOD TEST: CPT

## 2017-07-25 PROCEDURE — 74011250636 HC RX REV CODE- 250/636: Performed by: SURGERY

## 2017-07-25 PROCEDURE — 77030018717 HC DRSG GRNUFM KCON -B

## 2017-07-25 PROCEDURE — 80202 ASSAY OF VANCOMYCIN: CPT | Performed by: INTERNAL MEDICINE

## 2017-07-25 PROCEDURE — 74011000258 HC RX REV CODE- 258: Performed by: SURGERY

## 2017-07-25 PROCEDURE — 74011250636 HC RX REV CODE- 250/636: Performed by: EMERGENCY MEDICINE

## 2017-07-25 PROCEDURE — 36415 COLL VENOUS BLD VENIPUNCTURE: CPT | Performed by: SURGERY

## 2017-07-25 PROCEDURE — 3331090001 HH PPS REVENUE CREDIT

## 2017-07-25 PROCEDURE — 97530 THERAPEUTIC ACTIVITIES: CPT

## 2017-07-25 PROCEDURE — 84100 ASSAY OF PHOSPHORUS: CPT | Performed by: SURGERY

## 2017-07-25 PROCEDURE — 74011250637 HC RX REV CODE- 250/637: Performed by: SURGERY

## 2017-07-25 PROCEDURE — 97116 GAIT TRAINING THERAPY: CPT

## 2017-07-25 RX ORDER — SILVER NITRATE 38.21; 12.74 MG/1; MG/1
3 STICK TOPICAL ONCE
Status: COMPLETED | OUTPATIENT
Start: 2017-07-25 | End: 2017-07-25

## 2017-07-25 RX ORDER — VANCOMYCIN/0.9 % SOD CHLORIDE 1.5G/250ML
1500 PLASTIC BAG, INJECTION (ML) INTRAVENOUS EVERY 24 HOURS
Status: DISCONTINUED | OUTPATIENT
Start: 2017-07-26 | End: 2017-07-31

## 2017-07-25 RX ADMIN — Medication 10 ML: at 06:58

## 2017-07-25 RX ADMIN — Medication: at 04:27

## 2017-07-25 RX ADMIN — HYDROMORPHONE HYDROCHLORIDE 1 MG: 1 INJECTION, SOLUTION INTRAMUSCULAR; INTRAVENOUS; SUBCUTANEOUS at 11:42

## 2017-07-25 RX ADMIN — PIPERACILLIN SODIUM,TAZOBACTAM SODIUM 3.38 G: 3; .375 INJECTION, POWDER, FOR SOLUTION INTRAVENOUS at 22:13

## 2017-07-25 RX ADMIN — Medication 10 ML: at 11:43

## 2017-07-25 RX ADMIN — SODIUM CHLORIDE 40 MG: 9 INJECTION INTRAMUSCULAR; INTRAVENOUS; SUBCUTANEOUS at 09:39

## 2017-07-25 RX ADMIN — SILVER NITRATE APPLICATORS 3 APPLICATOR: 25; 75 STICK TOPICAL at 12:09

## 2017-07-25 RX ADMIN — ONDANSETRON 8 MG: 2 INJECTION INTRAMUSCULAR; INTRAVENOUS at 17:24

## 2017-07-25 RX ADMIN — Medication: at 14:40

## 2017-07-25 RX ADMIN — Medication 10 ML: at 22:14

## 2017-07-25 RX ADMIN — ONDANSETRON 8 MG: 2 INJECTION INTRAMUSCULAR; INTRAVENOUS at 10:47

## 2017-07-25 RX ADMIN — Medication 0.76 MG: at 22:16

## 2017-07-25 RX ADMIN — ONDANSETRON 8 MG: 2 INJECTION INTRAMUSCULAR; INTRAVENOUS at 00:13

## 2017-07-25 RX ADMIN — Medication 5 ML: at 14:00

## 2017-07-25 RX ADMIN — PIPERACILLIN SODIUM,TAZOBACTAM SODIUM 3.38 G: 3; .375 INJECTION, POWDER, FOR SOLUTION INTRAVENOUS at 14:47

## 2017-07-25 RX ADMIN — Medication 0.76 MG: at 06:57

## 2017-07-25 RX ADMIN — OCTREOTIDE ACETATE 50 MCG: 50 INJECTION, SOLUTION INTRAVENOUS; SUBCUTANEOUS at 17:24

## 2017-07-25 RX ADMIN — PIPERACILLIN SODIUM,TAZOBACTAM SODIUM 3.38 G: 3; .375 INJECTION, POWDER, FOR SOLUTION INTRAVENOUS at 06:30

## 2017-07-25 RX ADMIN — Medication 0.76 MG: at 14:45

## 2017-07-25 RX ADMIN — HYDROMORPHONE HYDROCHLORIDE 1 MG: 1 INJECTION, SOLUTION INTRAMUSCULAR; INTRAVENOUS; SUBCUTANEOUS at 09:37

## 2017-07-25 RX ADMIN — OCTREOTIDE ACETATE 50 MCG: 50 INJECTION, SOLUTION INTRAVENOUS; SUBCUTANEOUS at 00:13

## 2017-07-25 RX ADMIN — OCTREOTIDE ACETATE 50 MCG: 50 INJECTION, SOLUTION INTRAVENOUS; SUBCUTANEOUS at 10:32

## 2017-07-25 RX ADMIN — HYDROMORPHONE HYDROCHLORIDE 1 MG: 1 INJECTION, SOLUTION INTRAMUSCULAR; INTRAVENOUS; SUBCUTANEOUS at 20:11

## 2017-07-25 RX ADMIN — OCTREOTIDE ACETATE 50 MCG: 50 INJECTION, SOLUTION INTRAVENOUS; SUBCUTANEOUS at 22:16

## 2017-07-25 RX ADMIN — VANCOMYCIN HYDROCHLORIDE 1250 MG: 10 INJECTION, POWDER, LYOPHILIZED, FOR SOLUTION INTRAVENOUS at 14:18

## 2017-07-25 RX ADMIN — CLOPIDOGREL BISULFATE 75 MG: 75 TABLET ORAL at 09:40

## 2017-07-25 RX ADMIN — ASPIRIN 81 MG CHEWABLE TABLET 81 MG: 81 TABLET CHEWABLE at 09:40

## 2017-07-25 RX ADMIN — Medication 40 ML: at 14:00

## 2017-07-25 RX ADMIN — CALCIUM GLUCONATE: 94 INJECTION, SOLUTION INTRAVENOUS at 18:27

## 2017-07-25 RX ADMIN — SODIUM CHLORIDE 100 MG: 900 INJECTION, SOLUTION INTRAVENOUS at 17:24

## 2017-07-25 NOTE — PROGRESS NOTES
Bedside and Verbal shift change report given to serena (oncoming nurse) by Leonardo Francois (offgoing nurse). Report included the following information SBAR, Kardex, OR Summary, Procedure Summary, Intake/Output, MAR, Recent Results and Cardiac Rhythm nsr.

## 2017-07-25 NOTE — PROGRESS NOTES
ID Progress Note  2017    Subjective:     Seems to be having an ok day--walked some today and now up in chair    Objective:     Antibiotics:  1. Vancomycin  2. Zosyn   3. Eraxis      Vitals:   Visit Vitals    /47 (BP 1 Location: Right arm, BP Patient Position: At rest)    Pulse (!) 109    Temp 98.3 °F (36.8 °C)    Resp 18    Ht 5' 4\" (1.626 m)    Wt 153.3 kg (337 lb 15.4 oz)    SpO2 100%    Breastfeeding No    BMI 58.01 kg/m2        Tmax:  Temp (24hrs), Av.7 °F (37.1 °C), Min:98.3 °F (36.8 °C), Max:99.4 °F (37.4 °C)      Exam:  Lungs:  clear to auscultation bilaterally  Heart:  regularly irregular rhythm  Abdomen:  abnormal findings:  tenderness moderate in the entire abdomen, hypoactive bowel sounds    Labs:      Recent Labs      17   0019  17   0309  17   0240   WBC  8.9  8.7  9.6   HGB  7.2*  7.2*  7.7*   PLT  314  320  352   BUN  20  21*  24*   CREA  1.02  0.96  1.00       Cultures:     Lab Results   Component Value Date/Time    Specimen Description: URINE 2009 07:45 PM     Lab Results   Component Value Date/Time    Culture result: NO GROWTH ON SOLID MEDIA AT 4 DAYS 2017 12:17 PM    Culture result:  2017 12:17 PM     **METHICILLIN RESISTANT STAPHYLOCOCCUS AUREUS**  ISOLATED FROM THIO BROTH ONLY      Culture result: NO GROWTH 5 DAYS 2017 02:35 PM       Radiology:     Line/Insert Date:           Assessment:     1. Ischemic gut with frozen abdomen  2. Leukocytosis--back up after surgery--trending back down overall--down to normal  3. Cultures negative to date    Objective:     1. Continue IV therapy--will start to remove antibiotics this week if no objections from other attendings  2. Follow up cultures and studies  3.  Work up fevers    Elmer Antonio MD

## 2017-07-25 NOTE — PROGRESS NOTES
Patient seen by Dr. Caleb Hernández today for wound vac change due to bleeding and clot in [place] drain, blood clot was evacuated and surgicel applied. Bleeding was controlled by Silver Nitrate. However, the wound is healing well. CM discussed patient with Amparo Worrell, WOIRAM, patient will need the wound vac for an extended period. TPN and IV abx renewed. Remains on PCA for pain management and followed by Palliative Care. Patient up in chair. CM will continue to follow for transition of care.   Summer Davis MSA, RN, CRM

## 2017-07-25 NOTE — PROGRESS NOTES
Subjective  Pt without new complaints. Temp:  [97.9 °F (36.6 °C)-99.9 °F (37.7 °C)]   Pulse (Heart Rate):  []   BP: (108-138)/(40-89)   Resp Rate:  [14-20]   O2 Sat (%):  [97 %-100 %]   Weight:  [330 lb 11 oz (150 kg)-337 lb 15.4 oz (153.3 kg)]   07/25 0701 - 07/25 1900  In: -   Out: 425 [Drains:425]  07/23 1901 - 07/25 0700  In: 8869.2 [I.V.:8869.2]  Out: 66948 [Urine:4850; Drains:5470]      Objective  Gen- Alert in  NAD  Abdomen- There is bleeding and clot coming into the fred drain and around the vac dressing there is clot  Wound care called  The vac was removed and the clot was evacuated. There was some bleeding on the left side next to the malinkodt drain. Pressure was healed and surgicel applied. There was some bleeding. That was controlled with silver nitrate. More pressure was held and the bleeding eventually stopped. Otherwise the wound is healing well. Right Mailinkodt seems to be clogged and non functioning.       Recent Results (from the past 24 hour(s))   GLUCOSE, POC    Collection Time: 07/24/17  6:47 PM   Result Value Ref Range    Glucose (POC) 113 (H) 65 - 100 mg/dL    Performed by [address], POC    Collection Time: 07/24/17 11:15 PM   Result Value Ref Range    Glucose (POC) 112 (H) 65 - 100 mg/dL    Performed by [facility]    METABOLIC PANEL, BASIC    Collection Time: 07/25/17 12:19 AM   Result Value Ref Range    Sodium 139 136 - 145 mmol/L    Potassium 3.6 3.5 - 5.1 mmol/L    Chloride 108 97 - 108 mmol/L    CO2 24 21 - 32 mmol/L    Anion gap 7 5 - 15 mmol/L    Glucose 116 (H) 65 - 100 mg/dL    BUN 20 6 - 20 MG/DL    Creatinine 1.02 0.55 - 1.02 MG/DL    BUN/Creatinine ratio 20 12 - 20      GFR est AA >60 >60 ml/min/1.73m2    GFR est non-AA >60 >60 ml/min/1.73m2    Calcium 8.0 (L) 8.5 - 10.1 MG/DL   MAGNESIUM    Collection Time: 07/25/17 12:19 AM   Result Value Ref Range    Magnesium 1.7 1.6 - 2.4 mg/dL   PHOSPHORUS    Collection Time: 07/25/17 12:19 AM   Result Value Ref Range    Phosphorus 3.6 2.6 - 4.7 MG/DL   CBC WITH AUTOMATED DIFF    Collection Time: 07/25/17 12:19 AM   Result Value Ref Range    WBC 8.9 3.6 - 11.0 K/uL    RBC 2.46 (L) 3.80 - 5.20 M/uL    HGB 7.2 (L) 11.5 - 16.0 g/dL    HCT 22.5 (L) 35.0 - 47.0 %    MCV 91.5 80.0 - 99.0 FL    MCH 29.3 26.0 - 34.0 PG    MCHC 32.0 30.0 - 36.5 g/dL    RDW 16.6 (H) 11.5 - 14.5 %    PLATELET 492 918 - 132 K/uL    NEUTROPHILS 38 32 - 75 %    BAND NEUTROPHILS 1 0 - 6 %    LYMPHOCYTES 44 12 - 49 %    MONOCYTES 6 5 - 13 %    EOSINOPHILS 9 (H) 0 - 7 %    BASOPHILS 1 0 - 1 %    MYELOCYTES 1 (H) 0 %    ABS. NEUTROPHILS 3.5 1.8 - 8.0 K/UL    ABS. LYMPHOCYTES 3.9 (H) 0.8 - 3.5 K/UL    ABS. MONOCYTES 0.5 0.0 - 1.0 K/UL    ABS. EOSINOPHILS 0.8 (H) 0.0 - 0.4 K/UL    ABS. BASOPHILS 0.1 0.0 - 0.1 K/UL    DF MANUAL      RBC COMMENTS ANISOCYTOSIS  1+        RBC COMMENTS MICROCYTOSIS  1+        RBC COMMENTS POLYCHROMASIA  PRESENT        WBC COMMENTS TOXIC GRANULATION     NUCLEATED RBC    Collection Time: 07/25/17 12:19 AM   Result Value Ref Range    NRBC 0.0 0  WBC    ABSOLUTE NRBC 0.00 0.00 - 0.01 K/uL   GLUCOSE, POC    Collection Time: 07/25/17  6:56 AM   Result Value Ref Range    Glucose (POC) 129 (H) 65 - 100 mg/dL    Performed by [facility], POC    Collection Time: 07/25/17 11:46 AM   Result Value Ref Range    Glucose (POC) 121 (H) 65 - 100 mg/dL    Performed by Nelly Phoenix          Active Problems:    Abdominal pain (6/25/2017)      Small bowel ischemia ([address] (6/28/2017)      Overview: CT abd/pelvis 6/28/17      1. The stomach and proximal small bowel loops are distended. There is a       loop of      small bowel in the midabdomen which is mildly dilated and does not       demonstrate      enhancement in the wall and there is suspicion of pneumatosis and this       leads to      a loop of small bowel which demonstrates thickening of the wall and       findings are      suspicious for ischemia.       2. There is extensive mesenteric edema and a small amount of ascites in       the      pelvis. 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 There is nonocclusive thrombus in the SMA. 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)          Assessment & Plan  Wound bleeding - Bleeding from the granulation tissue likely worse due to the Plavix she is on for the SMA thrombosis. Replace vac to 25mmhg - Clamp fred  Would not use Right  Mailinkodt for now- leave to straight drain. Continue TPN. Abx per ID.

## 2017-07-25 NOTE — PROGRESS NOTES
Problem: Mobility Impaired (Adult and Pediatric)  Goal: *Acute Goals and Plan of Care (Insert Text)    Physical Therapy Goals  Revised 7/20/2017  1. Patient will move from supine to sit and sit to supine , scoot up and down and roll side to side in bed with independence within 7 day(s). 2. Patient will transfer from bed to chair and chair to bed with supervision/set-up using the least restrictive device within 7 day(s). 3. Patient will perform sit to stand with supervision/set-up within 7 day(s). 4. Patient will ambulate with supervision/set-up for 200 feet with the least restrictive device within 7 day(s). 5. Patient will ascend/descend 7 stairs with one handrail(s) with supervision/set-up within 7 day(s). Physical Therapy Goals  7/12/2017  1. Patient will move from supine to sit and sit to supine , scoot up and down and roll side to side in bed with independence within 7 day(s). 2. Patient will transfer from bed to chair and chair to bed with supervision/set-up using the least restrictive device within 7 day(s). 3. Patient will perform sit to stand with supervision/set-up within 7 day(s). 4. Patient will ambulate with supervision/set-up for 50 feet with the least restrictive device within 7 day(s). PHYSICAL THERAPY TREATMENT  Patient: Ean Chung (46 y.o. female)  Date: 7/25/2017  Diagnosis: N/V intractable post-opt pain  Abdominal pain  poor iv access  DEAD BOWEL  SMALL BOWEL OBSTRUCTION  central line placement  Abdominal pain <principal problem not specified>  Procedure(s) (LRB):  SPECIAL PROCEDURE OUTSIDE OF OR (N/A) 12 Days Post-Op  Precautions:    Chart, physical therapy assessment, plan of care and goals were reviewed. ASSESSMENT:  Pt received supine in bed, agreeable to PT. RN reported suction paused to allow pt w/ more mobility for PT. Pt requesting to use BSC prior to activity (SBA for bed>BSC).  Pt SBA for overall mobility, Independent w/self hygiene (instructed pt to use RW for additional support in standing to address self-hygiene). Pt amb approx 40 FT total (10FT x4,SBA) just outside room door w/ therapist managing IV pole and lines. Pt reporting increased fatigue w/ short gait and ready to return to chair at bedside. Instructed pt to use pillow as splint. Pt reported she may need to use RW for further mobility in hallway. Will continue to follow for progression of gait and to determine if pt in need of A.D w/ gait. Progression toward goals:  [X]      Improving appropriately and progressing toward goals  [ ]      Improving slowly and progressing toward goals  [ ]      Not making progress toward goals and plan of care will be adjusted       PLAN:  Patient continues to benefit from skilled intervention to address the above impairments. Continue treatment per established plan of care.   Discharge Recommendations:  Home Health  Further Equipment Recommendations for Discharge:  none       SUBJECTIVE:   Patient stated I would like to go to the bathroom first.      OBJECTIVE DATA SUMMARY:   Critical Behavior:  Neurologic State: Alert  Orientation Level: Oriented X4  Cognition: Appropriate decision making     Functional Mobility Training:  Bed Mobility:     Supine to Sit: Supervision  Sit to Supine:  (pt returned to chair)  Scooting: Supervision                    Transfers:  Sit to Stand: Stand-by asssistance  Stand to Sit: Stand-by asssistance        Bed to Chair: Stand-by asssistance (bed>BSC)                    Balance:  Sitting: Intact  Standing: Intact  Standing - Static: Good  Standing - Dynamic : Good  Ambulation/Gait Training:  Distance (ft): 40 Feet (ft)  Assistive Device: Gait belt  Ambulation - Level of Assistance: Stand-by asssistance        Gait Abnormalities: Antalgic;Decreased step clearance        Base of Support: Widened     Speed/Liz: Slow  Step Length: Left shortened;Right shortened                                   Pain:  Pain Scale 1: Numeric (0 - 10)  Pain Intensity 1: 6  Pain Location 1: Abdomen  Pain Orientation 1: Anterior  Pain Description 1: Aching  Pain Intervention(s) 1: Encouraged PCA  Activity Tolerance:   Fair  Please refer to the flowsheet for vital signs taken during this treatment.   After treatment:   [X] Patient left in no apparent distress sitting up in chair  [ ] Patient left in no apparent distress in bed  [X] Call bell left within reach  [X] Nursing notified  [ ] Caregiver present  [ ] Bed alarm activated      COMMUNICATION/COLLABORATION:   The patients plan of care was discussed with: Registered Nurse Fatmata KNIGHT Means   Time Calculation: 28 mins

## 2017-07-25 NOTE — PROGRESS NOTES
Pharmacist Note - Vancomycin Dosing  Therapy day 31  Indication: intraabdominal infection  Current regimen: 1250 mg IV Q24H    A Trough Level resulted at 10.2 mcg/mL which was obtained 23 hrs post-dose. The extrapolated \"true\" trough likely a little lower than 10 mcg/mL. Goal trough: 10 - 15 mcg/mL     Plan: Change to 1500 mg IV Q24H . Pharmacy will continue to monitor this patient daily for changes in clinical status and renal function.

## 2017-07-25 NOTE — PROGRESS NOTES
Bedside and Verbal shift change report given to [address]  (oncoming nurse) by [address] (offgoing nurse). Report included the following information SBAR, Kardex and ED Summary.

## 2017-07-25 NOTE — WOUND CARE
WOCN Note:     VAC with clot formation today - seen with Dr. Luly Sharma shows:  Admitted for nausea, vomiting, and abdominal pain; history of Crohn's, DVT, bowel perforation with surgery 6/7/17. Exploratory lap, KATHRINE, fistula exclusion and tube placement by Dr. Ml Ruelas 7/7/17. Assessment:   Patient is A&O x 4 and mobile. Bed: total care bariatric with air mattress   Diet: TPN with some clears  Pure Wick in use & draining clear yellow. Tolerates VAC change fairly well but was pre-medicated by RN with IV pain med.        Mucus fistula LLQ: red & Moist and almost at skin level; some mucosal transplantation noted; output is light green mucus. Activelife flat pouch intact.      1. Midline abdominal incision with three drains visible in the base.  Ana. Base (after clearing away large flat clot is 30% moist red mucosal tissue with peristalsis that reaches the left side drain insertion, 10% scattered moist yellow 40% moist pink with some mucus, 20% granular walls up to margin. ~100cc serosanguinous exudate in wall suction cannister  Dressed using 2 pieces of petroleum gauze as base layer over most of wound bed, 1 white sponge (RLQ drain rests on top of white sponge), 1 black sponge. KCI VAC set to 25mmHg continuous suction. RUQ drain to bedside bag clogged with yellow mucus. LUQ drain to bedside bag with green effluent. RLQ drain with end freely floating in wound to wall suction.  It is sandwiched between the white and black sponge in wound.        Recommendations:    Maintain VAC as ordered @ 25 mmHg continuous suction. Keep RLQ drain clamped and off of wall suction. Discussed above plan with patient, RN, and Dr. Tacho Saenz. Transition of Care: Plan to follow as needed while admitted to hospital with [facility] changes on Tuesdays and Fridays.      SONY Carroll, RN, [facility] [place]  Certified Wound, Ostomy, Continence Nurse  office [phone]  pager [phone] or call  to page      [phone]: returned to make sure no bleeding noted around VAC drape or in cannister. Clear exudate noted in tubing and none in cannister. VAC with good seal and no bleeding noted.

## 2017-07-26 LAB
ANION GAP BLD CALC-SCNC: 6 MMOL/L (ref 5–15)
BASOPHILS # BLD AUTO: 0 K/UL (ref 0–0.1)
BASOPHILS # BLD: 0 % (ref 0–1)
BUN SERPL-MCNC: 23 MG/DL (ref 6–20)
BUN/CREAT SERPL: 25 (ref 12–20)
CALCIUM SERPL-MCNC: 8.6 MG/DL (ref 8.5–10.1)
CHLORIDE SERPL-SCNC: 107 MMOL/L (ref 97–108)
CO2 SERPL-SCNC: 25 MMOL/L (ref 21–32)
CREAT SERPL-MCNC: 0.91 MG/DL (ref 0.55–1.02)
EOSINOPHIL # BLD: 0.6 K/UL (ref 0–0.4)
EOSINOPHIL NFR BLD: 7 % (ref 0–7)
ERYTHROCYTE [DISTWIDTH] IN BLOOD BY AUTOMATED COUNT: 16.9 % (ref 11.5–14.5)
GLUCOSE BLD STRIP.AUTO-MCNC: 108 MG/DL (ref 65–100)
GLUCOSE BLD STRIP.AUTO-MCNC: 109 MG/DL (ref 65–100)
GLUCOSE BLD STRIP.AUTO-MCNC: 97 MG/DL (ref 65–100)
GLUCOSE SERPL-MCNC: 73 MG/DL (ref 65–100)
HCT VFR BLD AUTO: 24.3 % (ref 35–47)
HGB BLD-MCNC: 7.5 G/DL (ref 11.5–16)
LYMPHOCYTES # BLD AUTO: 46 % (ref 12–49)
LYMPHOCYTES # BLD: 4.2 K/UL (ref 0.8–3.5)
MAGNESIUM SERPL-MCNC: 2 MG/DL (ref 1.6–2.4)
MCH RBC QN AUTO: 28 PG (ref 26–34)
MCHC RBC AUTO-ENTMCNC: 30.9 G/DL (ref 30–36.5)
MCV RBC AUTO: 90.7 FL (ref 80–99)
MONOCYTES # BLD: 1.1 K/UL (ref 0–1)
MONOCYTES NFR BLD AUTO: 12 % (ref 5–13)
NEUTS SEG # BLD: 3.2 K/UL (ref 1.8–8)
NEUTS SEG NFR BLD AUTO: 35 % (ref 32–75)
PHOSPHATE SERPL-MCNC: 4.3 MG/DL (ref 2.6–4.7)
PLATELET # BLD AUTO: 383 K/UL (ref 150–400)
POTASSIUM SERPL-SCNC: 4.1 MMOL/L (ref 3.5–5.1)
RBC # BLD AUTO: 2.68 M/UL (ref 3.8–5.2)
SERVICE CMNT-IMP: ABNORMAL
SERVICE CMNT-IMP: ABNORMAL
SERVICE CMNT-IMP: NORMAL
SODIUM SERPL-SCNC: 138 MMOL/L (ref 136–145)
WBC # BLD AUTO: 9 K/UL (ref 3.6–11)

## 2017-07-26 PROCEDURE — 3331090002 HH PPS REVENUE DEBIT

## 2017-07-26 PROCEDURE — 97116 GAIT TRAINING THERAPY: CPT

## 2017-07-26 PROCEDURE — 74011250636 HC RX REV CODE- 250/636: Performed by: NURSE PRACTITIONER

## 2017-07-26 PROCEDURE — 84100 ASSAY OF PHOSPHORUS: CPT | Performed by: SURGERY

## 2017-07-26 PROCEDURE — 36415 COLL VENOUS BLD VENIPUNCTURE: CPT | Performed by: SURGERY

## 2017-07-26 PROCEDURE — 74011000250 HC RX REV CODE- 250: Performed by: SURGERY

## 2017-07-26 PROCEDURE — 74011250637 HC RX REV CODE- 250/637: Performed by: SURGERY

## 2017-07-26 PROCEDURE — 65660000000 HC RM CCU STEPDOWN

## 2017-07-26 PROCEDURE — 83735 ASSAY OF MAGNESIUM: CPT | Performed by: SURGERY

## 2017-07-26 PROCEDURE — 74011250636 HC RX REV CODE- 250/636: Performed by: EMERGENCY MEDICINE

## 2017-07-26 PROCEDURE — 74011250636 HC RX REV CODE- 250/636: Performed by: PHYSICAL MEDICINE & REHABILITATION

## 2017-07-26 PROCEDURE — 74011250636 HC RX REV CODE- 250/636: Performed by: INTERNAL MEDICINE

## 2017-07-26 PROCEDURE — 74011000258 HC RX REV CODE- 258: Performed by: SURGERY

## 2017-07-26 PROCEDURE — 82962 GLUCOSE BLOOD TEST: CPT

## 2017-07-26 PROCEDURE — 74011636637 HC RX REV CODE- 636/637: Performed by: SURGERY

## 2017-07-26 PROCEDURE — 74011250636 HC RX REV CODE- 250/636: Performed by: SURGERY

## 2017-07-26 PROCEDURE — 85025 COMPLETE CBC W/AUTO DIFF WBC: CPT | Performed by: SURGERY

## 2017-07-26 PROCEDURE — 97110 THERAPEUTIC EXERCISES: CPT

## 2017-07-26 PROCEDURE — 3331090001 HH PPS REVENUE CREDIT

## 2017-07-26 PROCEDURE — 80048 BASIC METABOLIC PNL TOTAL CA: CPT | Performed by: SURGERY

## 2017-07-26 PROCEDURE — C9113 INJ PANTOPRAZOLE SODIUM, VIA: HCPCS | Performed by: SURGERY

## 2017-07-26 PROCEDURE — 74011000258 HC RX REV CODE- 258: Performed by: INTERNAL MEDICINE

## 2017-07-26 PROCEDURE — 74011000250 HC RX REV CODE- 250: Performed by: NURSE PRACTITIONER

## 2017-07-26 RX ADMIN — ASPIRIN 81 MG CHEWABLE TABLET 81 MG: 81 TABLET CHEWABLE at 09:36

## 2017-07-26 RX ADMIN — CALCIUM GLUCONATE: 94 INJECTION, SOLUTION INTRAVENOUS at 18:21

## 2017-07-26 RX ADMIN — Medication 10 ML: at 21:53

## 2017-07-26 RX ADMIN — Medication 10 ML: at 16:38

## 2017-07-26 RX ADMIN — Medication 0.76 MG: at 21:53

## 2017-07-26 RX ADMIN — Medication 0.76 MG: at 05:30

## 2017-07-26 RX ADMIN — Medication: at 11:18

## 2017-07-26 RX ADMIN — VANCOMYCIN HYDROCHLORIDE 1500 MG: 10 INJECTION, POWDER, LYOPHILIZED, FOR SOLUTION INTRAVENOUS at 13:28

## 2017-07-26 RX ADMIN — OCTREOTIDE ACETATE 50 MCG: 50 INJECTION, SOLUTION INTRAVENOUS; SUBCUTANEOUS at 09:36

## 2017-07-26 RX ADMIN — ONDANSETRON 8 MG: 2 INJECTION INTRAMUSCULAR; INTRAVENOUS at 16:37

## 2017-07-26 RX ADMIN — ONDANSETRON 8 MG: 2 INJECTION INTRAMUSCULAR; INTRAVENOUS at 09:47

## 2017-07-26 RX ADMIN — PIPERACILLIN SODIUM,TAZOBACTAM SODIUM 3.38 G: 3; .375 INJECTION, POWDER, FOR SOLUTION INTRAVENOUS at 13:28

## 2017-07-26 RX ADMIN — OCTREOTIDE ACETATE 50 MCG: 50 INJECTION, SOLUTION INTRAVENOUS; SUBCUTANEOUS at 21:53

## 2017-07-26 RX ADMIN — CLOPIDOGREL BISULFATE 75 MG: 75 TABLET ORAL at 09:36

## 2017-07-26 RX ADMIN — Medication: at 00:18

## 2017-07-26 RX ADMIN — SODIUM CHLORIDE 100 MG: 900 INJECTION, SOLUTION INTRAVENOUS at 17:27

## 2017-07-26 RX ADMIN — Medication: at 19:05

## 2017-07-26 RX ADMIN — Medication 10 ML: at 05:32

## 2017-07-26 RX ADMIN — Medication 0.76 MG: at 13:27

## 2017-07-26 RX ADMIN — OCTREOTIDE ACETATE 50 MCG: 50 INJECTION, SOLUTION INTRAVENOUS; SUBCUTANEOUS at 16:37

## 2017-07-26 RX ADMIN — I.V. FAT EMULSION 500 ML: 20 EMULSION INTRAVENOUS at 18:20

## 2017-07-26 RX ADMIN — HYDROMORPHONE HYDROCHLORIDE 1 MG: 1 INJECTION, SOLUTION INTRAMUSCULAR; INTRAVENOUS; SUBCUTANEOUS at 19:48

## 2017-07-26 RX ADMIN — PIPERACILLIN SODIUM,TAZOBACTAM SODIUM 3.38 G: 3; .375 INJECTION, POWDER, FOR SOLUTION INTRAVENOUS at 05:31

## 2017-07-26 RX ADMIN — Medication 10 ML: at 05:31

## 2017-07-26 RX ADMIN — HUMAN INSULIN 2 UNITS: 100 INJECTION, SOLUTION SUBCUTANEOUS at 00:12

## 2017-07-26 RX ADMIN — HYDROMORPHONE HYDROCHLORIDE 1 MG: 1 INJECTION, SOLUTION INTRAMUSCULAR; INTRAVENOUS; SUBCUTANEOUS at 05:27

## 2017-07-26 RX ADMIN — SODIUM CHLORIDE 40 MG: 9 INJECTION INTRAMUSCULAR; INTRAVENOUS; SUBCUTANEOUS at 09:36

## 2017-07-26 RX ADMIN — PIPERACILLIN SODIUM,TAZOBACTAM SODIUM 3.38 G: 3; .375 INJECTION, POWDER, FOR SOLUTION INTRAVENOUS at 21:52

## 2017-07-26 NOTE — PROGRESS NOTES
Subjective  No bleeding seen this morning     Temp:  [97.9 °F (36.6 °C)-99.9 °F (37.7 °C)]   Pulse (Heart Rate):  []   BP: (101-138)/(47-74)   Resp Rate:  [14-20]   O2 Sat (%):  [95 %-100 %]   Weight:  [335 lb 5.1 oz (152.1 kg)-338 lb 6.5 oz (153.5 kg)]      07/24 1901 - 07/26 0700  In: 9749.7 [P.O.:720;  I.V.:9029.7]  Out: 9745 [Urine:5500; Drains:4245]      Objective  Gen- Alert in NAD  Lungs- CTA  H-RRR  Abd- s/mild tenderness in right side of the wound/   No bleeding in the vac-   Bilious drainage from left    Recent Results (from the past 24 hour(s))   GLUCOSE, POC    Collection Time: 07/25/17 11:46 AM   Result Value Ref Range    Glucose (POC) 121 (H) 65 - 100 mg/dL    Performed by Rody Gibson, TROUGH    Collection Time: 07/25/17 12:45 PM   Result Value Ref Range    Vancomycin,trough 10.2 (H) 5.0 - 10.0 ug/mL    Reported dose date: NOT PROVIDED      Reported dose time: NOT PROVIDED      Reported dose: NOT PROVIDED UNITS   GLUCOSE, POC    Collection Time: 07/25/17  4:59 PM   Result Value Ref Range    Glucose (POC) 94 65 - 100 mg/dL    Performed by Nicole Garcia    GLUCOSE, POC    Collection Time: 07/25/17 11:15 PM   Result Value Ref Range    Glucose (POC) 140 (H) 65 - 100 mg/dL    Performed by [address] [place] [address], BASIC    Collection Time: 07/26/17  5:17 AM   Result Value Ref Range    Sodium 138 136 - 145 mmol/L    Potassium 4.1 3.5 - 5.1 mmol/L    Chloride 107 97 - 108 mmol/L    CO2 25 21 - 32 mmol/L    Anion gap 6 5 - 15 mmol/L    Glucose 73 65 - 100 mg/dL    BUN 23 (H) 6 - 20 MG/DL    Creatinine 0.91 0.55 - 1.02 MG/DL    BUN/Creatinine ratio 25 (H) 12 - 20      GFR est AA >60 >60 ml/min/1.73m2    GFR est non-AA >60 >60 ml/min/1.73m2    Calcium 8.6 8.5 - 10.1 MG/DL   MAGNESIUM    Collection Time: 07/26/17  5:17 AM   Result Value Ref Range    Magnesium 2.0 1.6 - 2.4 mg/dL   PHOSPHORUS    Collection Time: 07/26/17  5:17 AM   Result Value Ref Range    Phosphorus 4.3 2.6 - 4.7 MG/DL   CBC WITH AUTOMATED DIFF    Collection Time: 07/26/17  5:17 AM   Result Value Ref Range    WBC 9.0 3.6 - 11.0 K/uL    RBC 2.68 (L) 3.80 - 5.20 M/uL    HGB 7.5 (L) 11.5 - 16.0 g/dL    HCT 24.3 (L) 35.0 - 47.0 %    MCV 90.7 80.0 - 99.0 FL    MCH 28.0 26.0 - 34.0 PG    MCHC 30.9 30.0 - 36.5 g/dL    RDW 16.9 (H) 11.5 - 14.5 %    PLATELET 688 178 - 427 K/uL    NEUTROPHILS 35 32 - 75 %    LYMPHOCYTES 46 12 - 49 %    MONOCYTES 12 5 - 13 %    EOSINOPHILS 7 0 - 7 %    BASOPHILS 0 0 - 1 %    ABS. NEUTROPHILS 3.2 1.8 - 8.0 K/UL    ABS. LYMPHOCYTES 4.2 (H) 0.8 - 3.5 K/UL    ABS. MONOCYTES 1.1 (H) 0.0 - 1.0 K/UL    ABS. EOSINOPHILS 0.6 (H) 0.0 - 0.4 K/UL    ABS. BASOPHILS 0.0 0.0 - 0.1 K/UL   GLUCOSE, POC    Collection Time: 07/26/17  5:26 AM   Result Value Ref Range    Glucose (POC) 97 65 - 100 mg/dL    Performed by Amos Shaw          Active Problems:    Abdominal pain (6/25/2017)      Small bowel ischemia ([address] (6/28/2017)      Overview: CT abd/pelvis 6/28/17      1. The stomach and proximal small bowel loops are distended. There is a       loop of      small bowel in the midabdomen which is mildly dilated and does not       demonstrate      enhancement in the wall and there is suspicion of pneumatosis and this       leads to      a loop of small bowel which demonstrates thickening of the wall and       findings are      suspicious for ischemia. 2. There is extensive mesenteric edema and a small amount of ascites in       the      pelvis. 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 There is nonocclusive thrombus in the SMA. 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    abx removal per ID

## 2017-07-26 NOTE — PROGRESS NOTES
Bedside shift change report given to Josué Baron (oncoming nurse) by Robinson Blackman (offgoing nurse). Report included the following information SBAR, Kardex, Procedure Summary, Intake/Output, MAR and Recent Results.

## 2017-07-26 NOTE — PROGRESS NOTES
Problem: Falls - Risk of  Goal: *Absence of falls  Outcome: Progressing Towards Goal  Call bell is with in reach, side rails are up x 3, bed wheels are locked and bed is in its lowest position. Hourly rounds performed for patient safety. Goal: *Knowledge of fall prevention  Outcome: Progressing Towards Goal  Call bell is with in reach, side rails are up x 3, bed wheels are locked and bed is in its lowest position. Hourly rounds performed for patient safety. Problem: Pressure Injury - Risk of  Goal: *Prevention of pressure ulcer  Outcome: Progressing Towards Goal  Will continue to monitor skin integrity during shift and encourage patient to turn every two hours.

## 2017-07-26 NOTE — PROGRESS NOTES
NUTRITION COMPLETE ASSESSMENT    RECOMMENDATIONS:   1. Consider increasing TPN volume and LR volume while TPN is not running to be sure GI losses are covered (currently, both are providing 2810 mL/day and output was 4695 mL yesterday)     2. Increase GOAL TPN volume and duration to meet 100% of estimated needs and provide additional fluid  15 hours, 2400 mL:  5%AA D20 @ 85 mL/hr x 1 hour; 172 mL/hr x 13 hours; 85 mL/hr x last hour + same lipids    3. Would Pharmacy be ok with adding Trace Elements and Zinc Sulfate daily despite shortage secondary to pt with high GI losses and need for chronic TPN? 4. Continue daily weights and obtain via standing scale as able (current bed weight is up 10.8 kg from last standing weight on 7/19)     Interventions/Plan:   Food/Nutrient Delivery: TPN as sole source of nutrition    Assessment:   Reason for Assessment:   [x]Reassessment     Diet: NPO with sips of clears and ice chips  Nutritionally Significant Medications: [x] Reviewed & Includes: LR @ 25 mL/hr; vancomycin; sandostatin TID; protonis daily; zosyn q8 hours; compazine q 6 hours prn; zofran q 6 hours prn; insulin correction scale     TPN: 5%AA D20 @ 85 mL/hr x 1 hour; 170 mL/hr x 12 hour; 85 mL/hr x last hour + 20% lipids, 500 mL 3x/week + MVI, trace elements (3x/week), zinc sulfate (3x/week), 14 units insulin/L, thiamine (100 mg)    Meal Intake:   Patient Vitals for the past 100 hrs:   % Diet Eaten   07/25/17 1200 0 %   07/25/17 0800 0 %       Subjective:  Pt sleeping soundly upon visiting the room this morning despite calling her name and IV beeping. Allowed her to rest (she often sleeps in). Noted since pt with complaints of nausea. Objective:  Chart reviewed, discussed with RN. Pt remains on TPN as sole source of nutrition. She is NPO with sips of clears and ice chips. Output from ostomy has been minimal.  Drain output was 4695 mL yesterday and 1525 mL so far today (po intake yesterday documented at 720 mL). This is high and unsure if her TPN/LR infusions are making up for GI losses. TPN is providing a daily average of 2374 kcal, 111 g protein in 2210 mL-- meeting 95% and 100% of estimated kcal and protein needs, respectively. New goal recs (above) would provide a daily average of 2541 kcal, 120 g protein in 2400 mL-- meeting 100% of estimated needs. LR is providing 600 mL/day    Weight up 10.8 kg   Estimated Nutrition Needs:   Kcals/day: 2500 Kcals/day (3046-6115 kcal/day ([place] [address] Jeor x 1.2-1.3))  Protein: 110 g (110-136 g/day (2-2.5 g/kg IBW))  Fluid:  (1  ml/kcal)     Based On: [place] Jeor  Weight Used: Actual wt (142.7 kg standing scale on 7/19)    Pt expected to meet estimated nutrient needs:  [x]   Yes  (via TPN)    Nutrition Diagnosis:   1.  Inadequate oral food/beverage intake related to unresectable ischemic bowel  as evidenced by NPO with TPN as sole source of nutrition    Goals:     TPN to meet at least 90% of estimated needs over the next 5-7 days     Monitoring & Evaluation:    - Enteral/parenteral nutrition intake   - Electrolyte and renal profile, Weight/weight change, GI profile     Previous Nutrition Goals Met:  Yes  Previous Recommendations:      Yes    Education & Discharge Needs:   [x] None Identified   [] Identified and addressed    [x] Participated in care plan, discharge planning, and/or interdisciplinary rounds        Cultural, Baptist and ethnic food preferences identified:   None    Skin Integrity: []Intact  [x]Other: see wound and drain documentation  Edema: []None [x]Other: 2+ generalized  Last BM: ileostomy (output minimal-- 10 mL so far today)  Food Allergies: [x]None []Other    Anthropometrics:    Weight Loss Metrics 7/26/2017 6/25/2017 6/25/2017 6/25/2017 6/22/2017 6/12/2017 6/6/2017   Today's Wt 338 lb 6.5 oz - 343 lb - 343 lb 343 lb 14.7 oz -   BMI - 58.09 kg/m2 - 58.88 kg/m2 58.88 kg/m2 - 59.03 kg/m2      Last 3 Recorded Weights in this Encounter    07/24/17 9585 07/25/17 0400 07/26/17 0455   Weight: 152.1 kg (335 lb 5.1 oz) 153.3 kg (337 lb 15.4 oz) 153.5 kg (338 lb 6.5 oz)      Weight Source: Bed  Height: 5' 4\" (162.6 cm),    Body mass index is 58.09 kg/(m^2). IBW : 54.4 kg (120 lb), % IBW (Calculated): 263.45 %   ,      Labs:    Lab Results   Component Value Date/Time    Sodium 138 07/26/2017 05:17 AM    Potassium 4.1 07/26/2017 05:17 AM    Chloride 107 07/26/2017 05:17 AM    CO2 25 07/26/2017 05:17 AM    Glucose 73 07/26/2017 05:17 AM    BUN 23 07/26/2017 05:17 AM    Creatinine 0.91 07/26/2017 05:17 AM    Calcium 8.6 07/26/2017 05:17 AM    Magnesium 2.0 07/26/2017 05:17 AM    Phosphorus 4.3 07/26/2017 05:17 AM    Albumin 1.5 07/08/2017 04:42 AM     No results found for: HBA1C, HGBE8, RSP6QRJA, KHO1NXNF, RZT6BAKI  Lab Results   Component Value Date/Time    Glucose 73 07/26/2017 05:17 AM    Glucose (POC) 97 07/26/2017 05:26 AM      Lab Results   Component Value Date/Time    ALT (SGPT) 13 07/08/2017 04:42 AM    AST (SGOT) 11 07/08/2017 04:42 AM    Alk.  phosphatase 120 07/08/2017 04:42 AM    Bilirubin, direct 0.1 07/07/2009 06:38 PM    Bilirubin, total 0.6 07/08/2017 04:42 AM      [address]

## 2017-07-26 NOTE — PROGRESS NOTES
Attempted follow-up with Ms. Kwong. Staff was at bedside offering care to pt. Chaplains will continue to follow as able/needed. Pt has shared in the past that she receives visits from her . Music therapist has also offered support during this hospitalization. This  will consult with music therapist today regarding pt's case.     Bhumika Miller, Palliative

## 2017-07-26 NOTE — PROGRESS NOTES
Physical Therapy:  Chart reviewed. Spoke to [facility]. Patient currently very nauseous and to f/u this afternoon regarding PT treatment.       Alvaro Jose, PT

## 2017-07-26 NOTE — PROGRESS NOTES
Problem: Mobility Impaired (Adult and Pediatric)  Goal: *Acute Goals and Plan of Care (Insert Text)    Physical Therapy Goals  Revised 7/20/2017  1. Patient will move from supine to sit and sit to supine , scoot up and down and roll side to side in bed with independence within 7 day(s). 2. Patient will transfer from bed to chair and chair to bed with supervision/set-up using the least restrictive device within 7 day(s). 3. Patient will perform sit to stand with supervision/set-up within 7 day(s). 4. Patient will ambulate with supervision/set-up for 200 feet with the least restrictive device within 7 day(s). 5. Patient will ascend/descend 7 stairs with one handrail(s) with supervision/set-up within 7 day(s). Physical Therapy Goals  7/12/2017  1. Patient will move from supine to sit and sit to supine , scoot up and down and roll side to side in bed with independence within 7 day(s). 2. Patient will transfer from bed to chair and chair to bed with supervision/set-up using the least restrictive device within 7 day(s). 3. Patient will perform sit to stand with supervision/set-up within 7 day(s). 4. Patient will ambulate with supervision/set-up for 50 feet with the least restrictive device within 7 day(s). PHYSICAL THERAPY TREATMENT  Patient: Rachel Chung (46 y.o. female)  Date: 7/26/2017  Diagnosis: N/V intractable post-opt pain  Abdominal pain  poor iv access  DEAD BOWEL  SMALL BOWEL OBSTRUCTION  central line placement  Abdominal pain <principal problem not specified>  Procedure(s) (LRB):  SPECIAL PROCEDURE OUTSIDE OF OR (N/A) 13 Days Post-Op  Precautions:        ASSESSMENT:  Cleared by RN. Patient reports that her nausea has subsided from this morning. Patient very talkative and tearful at times regarding her situation. Patient increased amb distance to 60 ft with SBA in the room and assist x 1 to manage lines/leads/IV pole.   Patient completed lower extremity exercises without c/o of increased pain in abdomen. Recommend increasing ambulation distance outside of room next treatment session with possible use of RW if too fatigued. Patient continues to benefit from skilled PT/OT services. Progression toward goals:  [X]    Improving appropriately and progressing toward goals  [ ]    Improving slowly and progressing toward goals  [ ]    Not making progress toward goals and plan of care will be adjusted       PLAN:  Patient continues to benefit from skilled intervention to address the above impairments. Continue treatment per established plan of care. Discharge Recommendations:  Inpatient Rehab  Further Equipment Recommendations for Discharge:  TBD       SUBJECTIVE:   Patient stated I can try.       OBJECTIVE DATA SUMMARY:   Critical Behavior:  Neurologic State: Alert  Orientation Level: Oriented X4  Cognition: Follows commands     Functional Mobility Training:  Bed Mobility:   NT, patient sitting upright in bedside chair                 Transfers:  Sit to Stand: Stand-by asssistance  Stand to Sit: Stand-by asssistance                  Balance:  Sitting: Intact  Standing: Intact  Standing - Static: Good  Standing - Dynamic : Good  Ambulation/Gait Training:  Distance (ft): 60 Feet (ft) (completed in room)  Assistive Device: Gait belt (without AD)  Ambulation - Level of Assistance: Stand-by asssistance        Gait Abnormalities: Antalgic;Decreased step clearance        Base of Support: Widened     Speed/Liz: Slow  Step Length: Left shortened;Right shortened                 Therapeutic Exercises:   Seated ex's: ankle pumps x 20 reps, LAQ x 10 reps each, glut sets x 10 reps  Pain:  Pain Scale 1: Numeric (0 - 10)  Pain Intensity 1: 7  Pain Location 1: Abdomen  Pain Orientation 1: Anterior  Pain Description 1: Aching  Pain Intervention(s) 1: Encouraged PCA  Activity Tolerance:   VSS, c/o of pain with gait  Please refer to the flowsheet for vital signs taken during this treatment.   After treatment:   [X] Patient left in no apparent distress sitting up in chair  [ ]    Patient left in no apparent distress in bed  [X]    Call bell left within reach  [X]    Nursing notified  [ ]    Caregiver present  [ ]    Bed alarm activated      COMMUNICATION/COLLABORATION:   The patients plan of care was discussed with: Registered Nurse     Ernestina Dandy, PT   Time Calculation: 26 mins

## 2017-07-26 NOTE — PROGRESS NOTES
Faculty or Preceptor Review of Student Work    7/26/2017  - Shift times - [phone] to [phone]    The student documentation of patient care for Graham Buchanan has been reviewed and approved. All medications have been administered under the direct supervision of the faculty or preceptor.     Jamshid Altamirano, RN

## 2017-07-27 LAB
ANION GAP BLD CALC-SCNC: 7 MMOL/L (ref 5–15)
BASOPHILS # BLD AUTO: 0 K/UL (ref 0–0.1)
BASOPHILS # BLD: 0 % (ref 0–1)
BUN SERPL-MCNC: 22 MG/DL (ref 6–20)
BUN/CREAT SERPL: 25 (ref 12–20)
CALCIUM SERPL-MCNC: 8.1 MG/DL (ref 8.5–10.1)
CHLORIDE SERPL-SCNC: 109 MMOL/L (ref 97–108)
CO2 SERPL-SCNC: 24 MMOL/L (ref 21–32)
CREAT SERPL-MCNC: 0.89 MG/DL (ref 0.55–1.02)
DIFFERENTIAL METHOD BLD: ABNORMAL
EOSINOPHIL # BLD: 0.4 K/UL (ref 0–0.4)
EOSINOPHIL NFR BLD: 5 % (ref 0–7)
ERYTHROCYTE [DISTWIDTH] IN BLOOD BY AUTOMATED COUNT: 17 % (ref 11.5–14.5)
GLUCOSE BLD STRIP.AUTO-MCNC: 105 MG/DL (ref 65–100)
GLUCOSE BLD STRIP.AUTO-MCNC: 126 MG/DL (ref 65–100)
GLUCOSE BLD STRIP.AUTO-MCNC: 132 MG/DL (ref 65–100)
GLUCOSE BLD STRIP.AUTO-MCNC: 135 MG/DL (ref 65–100)
GLUCOSE BLD STRIP.AUTO-MCNC: 98 MG/DL (ref 65–100)
GLUCOSE SERPL-MCNC: 130 MG/DL (ref 65–100)
HCT VFR BLD AUTO: 20.3 % (ref 35–47)
HGB BLD-MCNC: 6.4 G/DL (ref 11.5–16)
LYMPHOCYTES # BLD AUTO: 35 % (ref 12–49)
LYMPHOCYTES # BLD: 3 K/UL (ref 0.8–3.5)
MAGNESIUM SERPL-MCNC: 1.8 MG/DL (ref 1.6–2.4)
MCH RBC QN AUTO: 28.6 PG (ref 26–34)
MCHC RBC AUTO-ENTMCNC: 31.5 G/DL (ref 30–36.5)
MCV RBC AUTO: 90.6 FL (ref 80–99)
MONOCYTES # BLD: 0.7 K/UL (ref 0–1)
MONOCYTES NFR BLD AUTO: 8 % (ref 5–13)
MYELOCYTES NFR BLD MANUAL: 2 %
NEUTS SEG # BLD: 4.4 K/UL (ref 1.8–8)
NEUTS SEG NFR BLD AUTO: 50 % (ref 32–75)
NRBC # BLD: 0.04 K/UL (ref 0–0.01)
NRBC BLD-RTO: 0.4 PER 100 WBC
PHOSPHATE SERPL-MCNC: 4.1 MG/DL (ref 2.6–4.7)
PLATELET # BLD AUTO: 327 K/UL (ref 150–400)
PLATELET COMMENTS,PCOM: ABNORMAL
POTASSIUM SERPL-SCNC: 3.9 MMOL/L (ref 3.5–5.1)
RBC # BLD AUTO: 2.24 M/UL (ref 3.8–5.2)
RBC MORPH BLD: ABNORMAL
SERVICE CMNT-IMP: ABNORMAL
SERVICE CMNT-IMP: NORMAL
SODIUM SERPL-SCNC: 140 MMOL/L (ref 136–145)
WBC # BLD AUTO: 8.7 K/UL (ref 3.6–11)
WBC MORPH BLD: ABNORMAL
WBC NRBC COR # BLD: ABNORMAL 10*3/UL

## 2017-07-27 PROCEDURE — 3331090001 HH PPS REVENUE CREDIT

## 2017-07-27 PROCEDURE — 74011000258 HC RX REV CODE- 258: Performed by: INTERNAL MEDICINE

## 2017-07-27 PROCEDURE — 74011000250 HC RX REV CODE- 250: Performed by: ANESTHESIOLOGY

## 2017-07-27 PROCEDURE — 74011000250 HC RX REV CODE- 250: Performed by: INTERNAL MEDICINE

## 2017-07-27 PROCEDURE — 74011250636 HC RX REV CODE- 250/636: Performed by: NURSE PRACTITIONER

## 2017-07-27 PROCEDURE — 97606 NEG PRS WND THER DME>50 SQCM: CPT

## 2017-07-27 PROCEDURE — 65660000000 HC RM CCU STEPDOWN

## 2017-07-27 PROCEDURE — 74011250636 HC RX REV CODE- 250/636: Performed by: INTERNAL MEDICINE

## 2017-07-27 PROCEDURE — 74011250636 HC RX REV CODE- 250/636: Performed by: SURGERY

## 2017-07-27 PROCEDURE — 74011636637 HC RX REV CODE- 636/637: Performed by: NURSE PRACTITIONER

## 2017-07-27 PROCEDURE — 84100 ASSAY OF PHOSPHORUS: CPT | Performed by: SURGERY

## 2017-07-27 PROCEDURE — 3331090002 HH PPS REVENUE DEBIT

## 2017-07-27 PROCEDURE — C9113 INJ PANTOPRAZOLE SODIUM, VIA: HCPCS | Performed by: SURGERY

## 2017-07-27 PROCEDURE — 85025 COMPLETE CBC W/AUTO DIFF WBC: CPT | Performed by: SURGERY

## 2017-07-27 PROCEDURE — 80048 BASIC METABOLIC PNL TOTAL CA: CPT | Performed by: SURGERY

## 2017-07-27 PROCEDURE — 36415 COLL VENOUS BLD VENIPUNCTURE: CPT | Performed by: SURGERY

## 2017-07-27 PROCEDURE — 83735 ASSAY OF MAGNESIUM: CPT | Performed by: SURGERY

## 2017-07-27 PROCEDURE — 74011000258 HC RX REV CODE- 258: Performed by: NURSE PRACTITIONER

## 2017-07-27 PROCEDURE — 74011250637 HC RX REV CODE- 250/637: Performed by: SURGERY

## 2017-07-27 PROCEDURE — 74011250636 HC RX REV CODE- 250/636: Performed by: ANESTHESIOLOGY

## 2017-07-27 PROCEDURE — 82962 GLUCOSE BLOOD TEST: CPT

## 2017-07-27 PROCEDURE — 74011000258 HC RX REV CODE- 258: Performed by: SURGERY

## 2017-07-27 PROCEDURE — 74011250636 HC RX REV CODE- 250/636: Performed by: PHYSICAL MEDICINE & REHABILITATION

## 2017-07-27 PROCEDURE — 74011250636 HC RX REV CODE- 250/636: Performed by: EMERGENCY MEDICINE

## 2017-07-27 PROCEDURE — 74011000250 HC RX REV CODE- 250: Performed by: NURSE PRACTITIONER

## 2017-07-27 PROCEDURE — 74011000250 HC RX REV CODE- 250: Performed by: SURGERY

## 2017-07-27 PROCEDURE — 77030019952 HC CANSTR VAC ASST KCON -B

## 2017-07-27 RX ADMIN — SODIUM CHLORIDE 100 MG: 900 INJECTION, SOLUTION INTRAVENOUS at 17:56

## 2017-07-27 RX ADMIN — Medication 0.76 MG: at 06:21

## 2017-07-27 RX ADMIN — ASPIRIN 81 MG CHEWABLE TABLET 81 MG: 81 TABLET CHEWABLE at 08:46

## 2017-07-27 RX ADMIN — PIPERACILLIN SODIUM,TAZOBACTAM SODIUM 3.38 G: 3; .375 INJECTION, POWDER, FOR SOLUTION INTRAVENOUS at 06:21

## 2017-07-27 RX ADMIN — HYDROMORPHONE HYDROCHLORIDE 1 MG: 1 INJECTION, SOLUTION INTRAMUSCULAR; INTRAVENOUS; SUBCUTANEOUS at 15:40

## 2017-07-27 RX ADMIN — VANCOMYCIN HYDROCHLORIDE 1500 MG: 10 INJECTION, POWDER, LYOPHILIZED, FOR SOLUTION INTRAVENOUS at 13:58

## 2017-07-27 RX ADMIN — Medication: at 23:51

## 2017-07-27 RX ADMIN — Medication 0.76 MG: at 22:42

## 2017-07-27 RX ADMIN — CLOPIDOGREL BISULFATE 75 MG: 75 TABLET ORAL at 08:46

## 2017-07-27 RX ADMIN — HYDROMORPHONE HYDROCHLORIDE 1 MG: 1 INJECTION, SOLUTION INTRAMUSCULAR; INTRAVENOUS; SUBCUTANEOUS at 22:58

## 2017-07-27 RX ADMIN — SODIUM CHLORIDE 5 MG: 9 INJECTION INTRAMUSCULAR; INTRAVENOUS; SUBCUTANEOUS at 08:31

## 2017-07-27 RX ADMIN — PIPERACILLIN SODIUM,TAZOBACTAM SODIUM 3.38 G: 3; .375 INJECTION, POWDER, FOR SOLUTION INTRAVENOUS at 13:57

## 2017-07-27 RX ADMIN — Medication 0.76 MG: at 13:57

## 2017-07-27 RX ADMIN — OCTREOTIDE ACETATE 50 MCG: 50 INJECTION, SOLUTION INTRAVENOUS; SUBCUTANEOUS at 10:11

## 2017-07-27 RX ADMIN — PIPERACILLIN SODIUM,TAZOBACTAM SODIUM 3.38 G: 3; .375 INJECTION, POWDER, FOR SOLUTION INTRAVENOUS at 22:42

## 2017-07-27 RX ADMIN — POTASSIUM CHLORIDE: 2 INJECTION, SOLUTION, CONCENTRATE INTRAVENOUS at 18:22

## 2017-07-27 RX ADMIN — HYDROMORPHONE HYDROCHLORIDE 1 MG: 1 INJECTION, SOLUTION INTRAMUSCULAR; INTRAVENOUS; SUBCUTANEOUS at 08:32

## 2017-07-27 RX ADMIN — OCTREOTIDE ACETATE 50 MCG: 50 INJECTION, SOLUTION INTRAVENOUS; SUBCUTANEOUS at 17:55

## 2017-07-27 RX ADMIN — ONDANSETRON 8 MG: 2 INJECTION INTRAMUSCULAR; INTRAVENOUS at 22:42

## 2017-07-27 RX ADMIN — Medication 10 ML: at 06:21

## 2017-07-27 RX ADMIN — Medication 10 ML: at 22:43

## 2017-07-27 RX ADMIN — OCTREOTIDE ACETATE 50 MCG: 50 INJECTION, SOLUTION INTRAVENOUS; SUBCUTANEOUS at 22:42

## 2017-07-27 RX ADMIN — SODIUM CHLORIDE 40 MG: 9 INJECTION INTRAMUSCULAR; INTRAVENOUS; SUBCUTANEOUS at 08:42

## 2017-07-27 RX ADMIN — ONDANSETRON 8 MG: 2 INJECTION INTRAMUSCULAR; INTRAVENOUS at 18:13

## 2017-07-27 RX ADMIN — ALTEPLASE 1 MG: 2.2 INJECTION, POWDER, LYOPHILIZED, FOR SOLUTION INTRAVENOUS at 18:37

## 2017-07-27 RX ADMIN — Medication: at 08:20

## 2017-07-27 RX ADMIN — Medication 10 ML: at 22:42

## 2017-07-27 RX ADMIN — Medication 10 ML: at 18:01

## 2017-07-27 RX ADMIN — Medication 10 ML: at 13:58

## 2017-07-27 RX ADMIN — Medication: at 16:38

## 2017-07-27 NOTE — PROGRESS NOTES
Subjective  No New complaints. Temp:  [97.9 °F (36.6 °C)-99.4 °F (37.4 °C)]   Pulse (Heart Rate):  []   BP: (101-139)/(27-69)   Resp Rate:  [14-20]   O2 Sat (%):  [95 %-100 %]   Weight:  [337 lb 15.4 oz (153.3 kg)-338 lb 6.5 oz (153.5 kg)]   07/27 0701 - 07/27 1900  In: 164.6 [I.V.:164.6]  Out: 2217 [Urine:800; Drains:1525]  07/25 1901 - 07/27 0700  In: 8487.7 [I.V.:8487.7]  Out: 9435 [Urine:5450; Drains:3975]      Objective  Gen-Alert in NAD  Lungs- CTA   H-RRR  Abd- there appears to be enteric drainage  From around the vac . The vac was removed -there was some drainage from around the malinkodt tube  There was a small amount of drainage from a possible new area slightly more inferior. Active Problems:    Abdominal pain (6/25/2017)      Small bowel ischemia ([address] (6/28/2017)      Overview: CT abd/pelvis 6/28/17      1. The stomach and proximal small bowel loops are distended. There is a       loop of      small bowel in the midabdomen which is mildly dilated and does not       demonstrate      enhancement in the wall and there is suspicion of pneumatosis and this       leads to      a loop of small bowel which demonstrates thickening of the wall and       findings are      suspicious for ischemia. 2. There is extensive mesenteric edema and a small amount of ascites in       the      pelvis. 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 There is nonocclusive thrombus in the SMA.             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

## 2017-07-27 NOTE — PROGRESS NOTES
ID Progress Note  2017    Subjective:     Seems to be having an ok day--walked some today and now up in chair    Objective:     Antibiotics:  1. Vancomycin  2. Zosyn   3. Eraxis      Vitals:   Visit Vitals    /61    Pulse (!) 105    Temp 97.9 °F (36.6 °C)    Resp 18    Ht 5' 4\" (1.626 m)    Wt 153.3 kg (337 lb 15.4 oz)    SpO2 99%    Breastfeeding No    BMI 58.01 kg/m2        Tmax:  Temp (24hrs), Av.3 °F (36.8 °C), Min:97.9 °F (36.6 °C), Max:98.4 °F (36.9 °C)      Exam:  Lungs:  clear to auscultation bilaterally  Heart:  regularly irregular rhythm  Abdomen:  abnormal findings:  tenderness moderate in the entire abdomen, hypoactive bowel sounds    Labs:      Recent Labs      17   0324  17   0517  17   0019   WBC  8.7  9.0  8.9   HGB  6.4*  7.5*  7.2*   PLT  327  383  314   BUN  22*  23*  20   CREA  0.89  0.91  1.02       Cultures:     Lab Results   Component Value Date/Time    Specimen Description: URINE 2009 07:45 PM     Lab Results   Component Value Date/Time    Culture result: NO GROWTH ON SOLID MEDIA AT 4 DAYS 2017 12:17 PM    Culture result:  2017 12:17 PM     **METHICILLIN RESISTANT STAPHYLOCOCCUS AUREUS**  ISOLATED FROM THIO BROTH ONLY      Culture result: NO GROWTH 5 DAYS 2017 02:35 PM       Radiology:     Line/Insert Date:           Assessment:     1. Ischemic gut with frozen abdomen  2. Leukocytosis--back up after surgery--trending back down overall--down to normal  3. Cultures negative to date    Objective:     1. Continue IV therapy--will start to remove antibiotics this week if no objections from other attendings  2. Follow up cultures and studies  3.  Work up fevers    Reinaldo Abraham MD

## 2017-07-27 NOTE — WOUND CARE
WOCN Note:     Follow-up visit for [facility] change with Dr. Annetta Fernandez and Pastor Daniel, NP, after enteric contents noted in cannister    Chart shows:  Admitted for nausea, vomiting, and abdominal pain; history of Crohn's, DVT, bowel perforation with surgery 6/7/17. Exploratory lap, KATHRINE, fistula exclusion and tube placement by Dr. Patrica Feliciano 7/7/17.     Assessment:   Patient is A&O x 4 and mobile. Bed: total care bariatric with air mattress   Diet: TPN with some clears  Pure Wick in use & draining clear yellow. Tolerates VAC change fairly well but was pre-medicated by RN with IV pain med.        Mucus fistula LLQ: red & Moist and almost at skin level; some mucosal transplantation noted; output is light green mucus. Activelife flat pouch intact.      1. Midline abdominal incision with three drains visible in the base = 17 x 11 x 4 cm (smaller).  Ana. Base is 30% moist red mucosal tissue with peristalsis that reaches the left side drain insertion (lower portion of this with small os noted and scant mucus output, 10% scattered moist yellow, 40% moist pink with some mucus, 20% granular walls up to margin. ~500cc green liquid in cannister. Flushed proximal LUQ drain with saline and saline escaped around tube insertion site in wound bed. Suspect majority of enteric contents escaping around drain. Dressed using 2 pieces of petroleum gauze as base layer over most of wound bed, 1 white sponge (RLQ drain rests on top of white sponge), 1 black sponge. KCI VAC set to 50mmHg continuous suction in the hopes of managing effluent better. Recommendations:    Maintain VAC as ordered @ 50mmHg suction. Rhae Sinning drain is clamped. Discussed above plan with patient & RN. Transition of Care: Plan to follow as needed while admitted to hospital with [facility] changes on Mondays and Thursdays.      SONY Power, RN, [facility]  Certified Wound, Ostomy, Continence Nurse  office [phone]  pager [phone] or call  to page

## 2017-07-27 NOTE — PROGRESS NOTES
Problem: Falls - Risk of  Goal: *Absence of falls  Outcome: Progressing Towards Goal  Call bell is with in reach, side rails are up x 3, bed wheels are locked and bed is in its lowest position. Hourly rounds performed for patient safety. Problem: Pressure Injury - Risk of  Goal: *Prevention of pressure ulcer  Outcome: Progressing Towards Goal  Will continue to monitor skin integrity during shift and encourage patient to turn every two hours.

## 2017-07-27 NOTE — PROGRESS NOTES
Physical Therapy  Attempted to see Ms Armida Torres for gait training/therapy this morning. Pt resting in bed and difficult to arouse with voice. Once awake, pt reports \"getting sick\" this morning and wishing to hold off until tomorrow. Pt agreeable to therapy checking back this PM as time allows-will do so as able and appropriate.   Thank you,  Rosio Hernandez, PT, DPT

## 2017-07-27 NOTE — PROGRESS NOTES
Music Therapy Assessment    Haja Kohli [id number]  [id number]    1975  46 y.o.  female    Patient Telephone Number: [phone] (home)   Christian Affiliation: Non Catholic   Language: English   Extended Emergency Contact Information  Primary Emergency Contact: Miesha Welsh  Address: [address] [place] [address] Phone: [phone]  Relation: Parent   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    Small bowel ischemia ([address] 06/28/2017    Superior mesenteric artery thrombosis ([facility] 06/28/2017    Abdominal pain 06/25/2017    Perforated bowel ([facility] 06/06/2017    Right flank pain 08/22/2011    Crohn's disease ([facility] 08/15/2011    DVT (deep venous thrombosis) ([facility] 08/15/2011    Incarcerated ventral hernia 08/15/2011        Date: 7/27/2017       Mental Status:   [x] Alert [  ] Kavita Ramming [  ]  Confused  [  ] Minimally responsive    Communication Status: [  ] Impaired Speech [  ] Nonverbal     Physical Status:   [  ] Oxygen in use  [  ] Hard of Hearing [x] Vision Impaired-Pt wears glasses. [  ] Ambulatory  [  ] Ambulatory with assistance [  ] Non-ambulatory -N/A    Music Preferences, Background: Classical, Rock, [address] [facility], [facility] and [place]; The patient sang in the school choir, and took guitar and cello lessons. Clinical Problem addressed: Support healthy coping, encourage self-expression, decrease feelings of stress, improve mood, positive social interaction.     Goal(s) met in session:  Physical/Pain management (Scale of 1-10):    Pre-session rating ___________    Post-session rating __________   [  ] Increased relaxation   [  ] Regulated breathing patterns  [  ] Decreased muscle tension   [  ] Minimized physical distress     Emotional/Psychological:  [  ] Increased self-expression   [  ] Decreased aggressive behavior   [  ] Decreased sadness   [  ] Discussed healthy coping skills     [  ] Improved mood    [  ] Decreased withdrawn behavior     Social:  [  ] Decreased feelings of isolation/loneliness [x] Positive social interaction   [  ] Provided support and/or comfort for family/friends    Spiritual:  [  ] Spiritual support    [  ] Expressed peace  [  ] Expressed jalen    [  ] Discussed beliefs    Techniques Utilized (Check all that apply): N/A: Please see Session Observations below. [  ] Procedural support MT [  ] Music for relaxation [  ] Patient preferred music  [  ] Suzie analysis  [  ] Song choice  [  ] Music for validation  [  ] Entrainment  [  ] Progressive muscle relax. [  ] Guided visualization  [  ] Bria Lopes  [  ] Patient instrument playing [  ] Mapori writing  [  ] Lorenza Campbell along   [  ] Theador Fey  [  ] Sensory stimulation  [  ] Active Listening  [  ] Music for spiritual support [  ] Making of CDs as gifts    Session Observations:  F/u visit; The patient (pt) was observed to have a sad affect while lying in bed. Her affect brightened upon this music therapist (MT) greeting her, as evidenced by (AEB) smiling with brightened eyes. She said she still had the suzie sheet the MT left with her last week. The MT offered a music therapy session. The pt was receptive to this, but said that she first needed to be changed. The MT offered to call her nurse for this, and the pt said that staff was already aware of this need and she expected this to be taken care of shortly. The MT offered to follow up later in the day as able and the pt expressed gratitude. Will follow as able.     Dmitri Salas, MT-BC (Music Therapist-Board Certified)  Spiritual Care Department  Referral-based service

## 2017-07-27 NOTE — PROGRESS NOTES
Palliative Medicine Consult  [place]ath: [phone] ([phone])    Patient Name: Leona Pinto  YOB: 1975    Date of Initial Consult: 6/27/17  Reason for Consult: overwhelming symptoms  Requesting Provider: Veronica Ann NP  Primary Care Physician: Luis A Deluca MD      SUMMARY:   Leona Pinto is a 46 y.o. with a past history of Crohn's disease, anxiety, chronic pain, hx DVT,multiple (4) small bowel resections, s/p recent urgent dx lap, with lysis of adhesions, repair of suspected perf 6/7/17 , who was admitted on 6/25/2017 from home with a diagnosis of worsening abdominal pain, elevated WBC and MRSA wound infection. Initially consulted for pain management thought to be possible Crohn's flare. Initial CT on 6/25 w/out acute findings but repeat on 6/28 showing ischemic bowl. S/p ex lap but ischemic bowl unresectable, s/p SMA stent. Extubated on 6/30. Most recently s/p ex lap, KATHRINE, feeding tube, fistula exclusion and wound vac placement for EC fistula. Remains on TPN, NPO w/ sips. Patient is followed chronically for pain management by Dr. Panchito Iqbal at [address] [place] [address]. Home regimen for chronic abdominal pain is MS Contin 60 Q8 and MS IR 30 Q6 PRN. She also takes Xanax 1mg TID prn anxiety.  reviewed, opioids last prescribed on 5/19/17. PALLIATIVE DIAGNOSES:     1. Abdominal pain, generalized  2. Anxiety  3. Nausea / vomiting  4. Debility   5. Crohn's disease  6. Chronic constipation (at home on Rel[address])  7. MRSA wound        PLAN:   1. Patient appears comfortable, at this time pain tolerable, she denies excessive sedation from basal and appears awake / altert; continue Morphine PCA 4mg/h basal, 5mg every 10 min demand. Pt feels that Morphine works better than Dilaudid (at equivalent doses). 2. Cont prn Dilaudid 1mg IV every 1h prn pain that is not controlled by PCA- hortencia before any dressing changes  3.  Pt had episode of nausea / emesis earlier; got zofran, now she denies nausea; continue close assessment and PRN antiemetics  4. Continue low dose scheduled ativan, pt tolerating without excessive sedation; would need slow wean if intending to stop  5. Will cont to follow for PCA management, although if stable may not see daily- please reach out for any concerns. Palliative  following for support. 6. Discussed with pt that as time nears for discharge, will need to transition off pca  7. If pt leaves hospital on TPN, will discuss transition to scheduled liquid meds versus trying the MSContin (concern that may cause nausea). Will speak to surgery team when this time comes. 8. Bowel regimen per surgical team.  9. Communicated plan of care with: Palliative IDT     GOALS OF CARE / TREATMENT PREFERENCES:   [====Goals of Care====]  GOALS OF CARE:  Patient / health care proxy stated goals: pain control  Recovery from acute issues      TREATMENT PREFERENCES:   Code Status: Full Code    Advance Care Planning:  Advance Care Planning 6/26/2017   Patient's Healthcare Decision Maker is: Legal Next of Camilo Jiménez   Primary Decision Maker Name [facility] [place] [facility] [place]   Primary Decision Maker Phone Number [phone]   Primary Decision Maker Relationship to Patient Parent   Confirm Advance Directive None   Patient Would Like to Complete Advance Directive No       Other:    The palliative care team has discussed with patient / health care proxy about goals of care / treatment preferences for patient.  [====Goals of Care====]         HISTORY:     Reviewed vitals, I/O's, labs, imaging, MAR, notes.   0PO, TPN, zosyn, vanc, eraxis  3111 UOP, 2875 drain out, 10 stool  hgb 6.4 lower; na and cr wnl  MAR  Tylenol, albuterol, no recent dose  Dilaudid 1mg IV q1h PRN, 2 doses 7/26, 1 dose 7/27 0800  Ativan 0.76mg IV q8h  Morphine 4mg / hr, 5mg pca dose q10min  zofran IV PRN, 2 doses 7/26    HPI/SUBJECTIVE:    The patient is:   [x] Verbal and participatory  [] Non-participatory due to: Medical condition     Pt reports abd pain 6 / 10, had an episode of vomiting earlier today, pain went up with that, had a dose of PRN iv dilaudid. Now pain tolerable again, no nausea, sob. Clinical Pain Assessment (nonverbal scale for severity on nonverbal patients):   [++++ Clinical Pain Assessment++++]  [++++Pain Severity++++]: Pain: 6  [++++Pain Character++++]: sharp and burning, stabbing  [++++Pain Duration++++]: several days  [++++Pain Effect++++]:  feeling anxious  [++++Pain Factors++++]: better after pain medicaion  [++++Pain Frequency++++]: constant  [++++Pain Location++++]: across abdomen both sides in middle at incision site  [++++ Clinical Pain Assessment++++]     FUNCTIONAL ASSESSMENT:     Palliative Performance Scale (PPS):  PPS: 40       PSYCHOSOCIAL/SPIRITUAL SCREENING:     Advance Care Planning:  Advance Care Planning 6/26/2017   Patient's Healthcare Decision Maker is: Legal Next herberth [address]   Primary Decision Maker Name Italia Bailey   Primary Decision Maker Phone Number [phone]   Primary Decision Maker Relationship to Patient Parent   Confirm Advance Directive None   Patient Would Like to Complete Advance Directive No        Any spiritual / Taoist concerns:  [] Yes /  [x] No    Caregiver Burnout:  [] Yes /  [x] No /  [] No Caregiver Present      Anticipatory grief assessment:   [x] Normal  / [] Maladaptive       ESAS Anxiety: Anxiety: 3    ESAS Depression: Depression: 2        REVIEW OF SYSTEMS:     Positive and pertinent negative findings in ROS are noted above in HPI. The following systems were [x] reviewed / [] unable to be reviewed as noted in HPI  Other findings are noted below. Systems: constitutional, ears/nose/mouth/throat, respiratory, gastrointestinal, genitourinary, musculoskeletal, integumentary, neurologic, psychiatric, endocrine. Positive findings noted below.   Modified ESAS Completed by: provider   Fatigue: 4 Drowsiness: 0   Depression: 2 Pain: 6   Anxiety: 3 Nausea: 0   Anorexia: 0 Dyspnea: 0 Constipation: No              PHYSICAL EXAM:     From RN flowsheet:  Wt Readings from Last 3 Encounters:   07/27/17 337 lb 15.4 oz (153.3 kg)   06/25/17 343 lb (155.6 kg)   06/22/17 343 lb (155.6 kg)     Blood pressure 111/44, pulse (!) 102, temperature 98.4 °F (36.9 °C), resp. rate 18, height 5' 4\" (1.626 m), weight 337 lb 15.4 oz (153.3 kg), SpO2 99 %, not currently breastfeeding. Pain Scale 1: Visual  Pain Intensity 1: 0  Pain Onset 1: chronic  Pain Location 1: Abdomen  Pain Orientation 1: Anterior  Pain Description 1: Aching  Pain Intervention(s) 1: Medication (see MAR)  Last bowel movement, if known: stool in ostomy     Constitutional:awake, alert, oriented, nad  Eyes: pupils equal, anicteric  ENMT: no nasal discharge, moist mucous membranes  Cardiac: regular rhythm   Respiratory: breathing not labored  Gastrointestinal: non-distended,  b/l wounds, hypoactive bowel sounds   Musculoskeletal: no deformity, no tenderness to palpation  Skin: warm, dry, no edema  Neurologic: moving all extremities  Psych: no agitation, restricted affect, no hallucinations     HISTORY:     Active Problems:    Abdominal pain (6/25/2017)      Small bowel ischemia (HCC) (6/28/2017)      Overview: CT abd/pelvis 6/28/17      1. The stomach and proximal small bowel loops are distended. There is a       loop of      small bowel in the midabdomen which is mildly dilated and does not       demonstrate      enhancement in the wall and there is suspicion of pneumatosis and this       leads to      a loop of small bowel which demonstrates thickening of the wall and       findings are      suspicious for ischemia. 2. There is extensive mesenteric edema and a small amount of ascites in       the      pelvis. 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 There is nonocclusive thrombus in the SMA.             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)      Past Medical History:   Diagnosis Date    Cro[facility]'s disease ([facility] 8/15/2011    DVT (deep venous thrombosis) ([facility] 8/15/2011    Incarcerated ventral hernia 8/15/2011    Right flank pain 8/22/2011    Seizures ([facility]     Stroke [facility])       Past Surgical History:   Procedure Laterality Date    HX OTHER SURGICAL      multiple procedures related to her Crohn's disease    NH LAP, INCISIONAL HERNIA REPAIR,INCARCERATED  9-10-08    dr. Sunday Santiago      Family History   Problem Relation Age of Onset    Hypertension Father     Stroke Father     Other Father      arthritis      History reviewed, no pertinent family history.   Social History   Substance Use Topics    Smoking status: Former Smoker    Smokeless tobacco: Not on file    Alcohol use No     Allergies   Allergen Reactions    Demerol [Meperidine] Unknown (comments)    Soma [Carisoprodol] Rash and Nausea Only    Sulfa (Sulfonamide Antibiotics) Unknown (comments)    Toradol [Ketorolac Tromethamine] Unknown (comments)      Current Facility-Administered Medications   Medication Dose Route Frequency    vancomycin (VANCOCIN) 1500 mg in  ml infusion  1,500 mg IntraVENous Q24H    acetaminophen (TYLENOL) tablet 650 mg  650 mg Oral Q4H PRN    LORazepam (ATIVAN) injection 0.76 mg  0.76 mg IntraVENous Q8H    albuterol (PROVENTIL VENTOLIN) nebulizer solution 2.5 mg  2.5 mg Inhalation Q4H PRN    octreotide (SANDOSTATIN) injection 50 mcg  50 mcg IntraVENous TID    clopidogrel (PLAVIX) tablet 75 mg  75 mg Oral DAILY    sodium chloride (NS) flush 10 mL  10 mL InterCATHeter Q24H    sodium chloride (NS) flush 10 mL  10 mL InterCATHeter PRN    sodium chloride (NS) flush 10-40 mL  10-40 mL InterCATHeter Q8H    alteplase (CATHFLO) 1 mg in sterile water (preservative free) 1 mL injection  1 mg InterCATHeter PRN    bacitracin 500 unit/gram packet 1 Packet  1 Packet Topical PRN    0.9% sodium chloride infusion 250 mL  250 mL IntraVENous PRN    lactated Ringers infusion  25 mL/hr IntraVENous CONTINUOUS  fat emulsion 20% (LIPOSYN, INTRALIPID) infusion 500 mL  500 mL IntraVENous Q MON, WED & FRI    HYDROmorphone (PF) (DILAUDID) injection 1 mg  1 mg IntraVENous Q1H PRN    aspirin chewable tablet 81 mg  81 mg Oral DAILY    insulin regular (NOVOLIN R, HUMULIN R) injection   SubCUTAneous Q6H    morphine (PF)  mg/30 ml   IntraVENous CONTINUOUS    glucose chewable tablet 16 g  4 Tab Oral PRN    glucagon (GLUCAGEN) injection 1 mg  1 mg IntraMUSCular PRN    dextrose 10 % infusion 125-250 mL  125-250 mL IntraVENous PRN    pantoprazole (PROTONIX) 40 mg in sodium chloride 0.9 % 10 mL injection  40 mg IntraVENous DAILY    prochlorperazine (COMPAZINE) with saline injection 5 mg  5 mg IntraVENous Q6H PRN    anidulafungin (ERAXIS) 100 mg in 0.9% sodium chloride 130 mL IVPB  100 mg IntraVENous Q24H    ondansetron (ZOFRAN) injection 8 mg  8 mg IntraVENous Q6H PRN    sodium chloride (NS) flush 5-10 mL  5-10 mL IntraVENous Q8H    sodium chloride (NS) flush 5-10 mL  5-10 mL IntraVENous PRN    naloxone (NARCAN) injection 0.4 mg  0.4 mg IntraVENous PRN    diphenhydrAMINE (BENADRYL) injection 12.5 mg  12.5 mg IntraVENous Q4H PRN    piperacillin-tazobactam (ZOSYN) 3.375 g in 0.9% sodium chloride (MBP/ADV) 100 mL  3.375 g IntraVENous Q8H    Vancomycin ---Pharmacy to Dose   Other Rx Dosing/Monitoring          LAB AND IMAGING FINDINGS:     Lab Results   Component Value Date/Time    WBC 8.7 07/27/2017 03:24 AM    HGB 6.4 07/27/2017 03:24 AM    PLATELET 012 34/37/5876 03:24 AM     Lab Results   Component Value Date/Time    Sodium 140 07/27/2017 03:24 AM    Potassium 3.9 07/27/2017 03:24 AM    Chloride 109 07/27/2017 03:24 AM    CO2 24 07/27/2017 03:24 AM    BUN 22 07/27/2017 03:24 AM    Creatinine 0.89 07/27/2017 03:24 AM    Calcium 8.1 07/27/2017 03:24 AM    Magnesium 1.8 07/27/2017 03:24 AM    Phosphorus 4.1 07/27/2017 03:24 AM      Lab Results   Component Value Date/Time    AST (SGOT) 11 07/08/2017 04:42 AM    Alk. phosphatase 120 07/08/2017 04:42 AM    Protein, total 7.0 07/08/2017 04:42 AM    Albumin 1.5 07/08/2017 04:42 AM    Globulin 5.5 07/08/2017 04:42 AM     No results found for: INR, PTMR, PTP, PT1, PT2, APTT   No results found for: IRON, FE, TIBC, IBCT, PSAT, FERR   No results found for: PH, PCO2, PO2  No components found for: GLPOC   No results found for: CPK, CKMB             Total time:    Counseling / coordination time, spent as noted above:    > 50% counseling / coordination?:     Prolonged service was provided for  []30 min   []75 min in face to face time in the presence of the patient, spent as noted above. Time Start:   Time End:   Note: this can only be billed with 41928 (initial) or 49655 (follow up). If multiple start / stop times, list each separately.

## 2017-07-27 NOTE — PROGRESS NOTES
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    Hospital Course:  PICU Course    CNS: Aries was heavily sedated post-op. After extubation, he was continued on precedes, fentanyl, and versed which were weaned as tolerated. He required some morphine boluses and was started on methdone and weaned off fentanyl. Precedex was weaned which Aries tolerated well and was stopped on 11/14 prior to transfer to the floor.     CVS: On admission, Aries was seen by cardiology who noted an ASD and PDA. They describe the PDA as "trivial in nature and hemodynamically insignificant" and expect it to resolve. Th ASD, however, was noted to have significant shunting and they recommended that every attempt be made to avoid air in IVs and other embolic risks.  Plan is to follow up outpatient with cardiology at 6mos.    Resp: Aries has a history of apnea on a previous sleep study. On admission, he was on 2L O2. Post-op, he returned to the ICU intubated and sedated. Given his critical airway, he was heavily sedated on arrival to the PICU until he was ultimately extubated on 11/9 to HFNC with subsequent weaning of respiratory support which he tolerated well.    HEENT: On admission, Aries was noted to have severe micrignathia. He underwent jaw distraction on 11/2. The distractors were turned per Dr. Mcpherson's instructions daily while Aries was in the PICU.     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 He will require " Problem: Falls - Risk of  Goal: *Absence of falls  Outcome: Progressing Towards Goal  Gripper socks on, call bell within reach, bedrails up x3  Goal: *Knowledge of fall prevention  Outcome: Progressing Towards Goal  Pt. Calls out appropriately     Problem: Surgical Pathway Post-Op Day 2 through Discharge  Goal: Activity/Safety  Outcome: Progressing Towards Goal  Pt. Ambulated in room with PT. tolerating well   Goal: Nutrition/Diet  Outcome: Progressing Towards Goal  Pt is NPO with sips of clears and ice chips  Goal: *Adequate urinary output (equal to or greater than 30 milliliters/hour)  Outcome: Progressing Towards Goal  Pt. Voiding adequately   Goal: *Hemodynamically stable  Outcome: Progressing Towards Goal  VS stable     Problem: Pain  Goal: *Control of Pain  Outcome: Progressing Towards Goal  Pt.  Pain is being controlled by Morphine PCA and IV dilaudid q1 for breakthrough pain bone conduction hearing aids after discharge.     FEN/GI: On admission, Aries was on EBM fortified to 22kcal 52mL q3h., on return to the PICU post-op, he was fed TP which he tolerated well.     Heme: Aries was on ferrous sulfate on admission which was continued throughout. He was transfused pRBCs on 11/7 for Hb 7.7    ID: Aries complete 5d vancomycin post-op per protocol. He remained otherwise stable and afebrile with no signs of infection.     MSK: On admission, Aries was noted to have R. club foot as well as a pedunculated R. thumb. Per plastics, this thumb must be removed surgically to avoid a neuroma. The plan is for this thumb to be removed when Aries is next sedated for a procedure, likely the removal of his distractors. The club foot will be managed outpatient.    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 [employer] was consulted and recommended whole exome sequencing which will be performed outpatient.       Scheduled Meds:   albuterol sulfate  2.5 mg Nebulization Q6H    famotidine  0.5 mg/kg (Dosing Weight) Oral BID    methadone  0.1 mg Oral Q8H    [START ON 2017] mupirocin   Topical (Top) Daily     Continuous Infusions:   heparin in 0.45% NaCl 1 Units/hr (11/20/17 0700)    heparin in 0.45% NaCl 1 Units/hr (11/20/17 0700)     PRN Meds:acetaminophen    Interval History: Feeds started at 1/3 volume and now on goal feeds with q3 hr boluses and tolerating them well. Extubated around 11pm yesterday and quickly moved to . Tolerating it well!      Review of Systems   Constitutional: Negative for fever.   Respiratory: Negative for cough.    Gastrointestinal: Negative for vomiting.   Genitourinary: Negative for decreased urine volume.   Skin: Negative for rash.     Objective:     Vital Signs Range (Last 24H):  Temp:  [97.1 °F (36.2 °C)-99.7 °F (37.6 °C)]   Pulse:  [130-177]   Resp:  [27-65]   BP: ()/(34-97)   SpO2:  [93  %-100 %]     I & O (Last 24H):    Intake/Output Summary (Last 24 hours) at 11/20/17 1608  Last data filed at 11/20/17 1542   Gross per 24 hour   Intake           344.57 ml   Output              404 ml   Net           -59.43 ml          Physical Exam:  Physical Exam   Constitutional: No distress.   Eyes open, in the bed   HENT:   Head: Anterior fontanelle is flat.   Eyes: EOM are normal.   Small pupils   Neck: Normal range of motion.   Cardiovascular: Normal rate, regular rhythm, S1 normal and S2 normal.    No murmur heard.  Pulmonary/Chest: Effort normal and breath sounds normal. No nasal flaring. No respiratory distress. He has no wheezes. He has no rhonchi.   POst extubation- shallow breaths b/l. Has NC in place.   Abdominal: Soft. Bowel sounds are normal. He exhibits no distension. There is no hepatosplenomegaly. There is no tenderness.   G-tube in place - C/D/I. Mild erythema around.    Neurological: He is alert. He displays normal reflexes. He exhibits abnormal muscle tone.   Skin: Skin is warm and moist. Capillary refill takes less than 2 seconds. No rash noted.       Lines/Drains/Airways     Central Venous Catheter Line                 Percutaneous Central Line Insertion/Assessment - double lumen  11/02/17 1957 left femoral vein 17 days          Drain                 Gastrostomy/Enterostomy 11/17/17 1446 Gastrostomy tube w/o balloon LUQ feeding 3 days                Laboratory (Last 24H):     Recent Results (from the past 24 hour(s))   CBC auto differential    Collection Time: 11/20/17  3:23 AM   Result Value Ref Range    WBC 13.47 5.00 - 20.00 K/uL    RBC 2.40 (L) 2.70 - 4.90 M/uL    Hemoglobin 7.8 (L) 9.0 - 14.0 g/dL    Hematocrit 22.4 (L) 28.0 - 42.0 %    MCV 93 74 - 115 fL    MCH 32.5 25.0 - 35.0 pg    MCHC 34.8 29.0 - 37.0 g/dL    RDW 15.9 (H) 11.5 - 14.5 %    Platelets 464 (H) 150 - 350 K/uL    MPV 9.7 9.2 - 12.9 fL    Immature Granulocytes 2.4 (H) 0.0 - 0.5 %    Gran # 5.5 1.0 - 9.0 K/uL    Immature  Grans (Abs) 0.33 (H) 0.00 - 0.04 K/uL    Lymph # 5.5 2.5 - 16.5 K/uL    Mono # 1.6 (H) 0.2 - 1.2 K/uL    Eos # 0.4 0.0 - 0.7 K/uL    Baso # 0.05 0.01 - 0.07 K/uL    nRBC 0 0 /100 WBC    Gran% 40.9 20.0 - 45.0 %    Lymph% 41.1 (L) 50.0 - 83.0 %    Mono% 11.9 3.8 - 15.5 %    Eosinophil% 3.3 0.0 - 4.0 %    Basophil% 0.4 0.0 - 0.6 %    Differential Method Automated    Basic metabolic panel    Collection Time: 11/20/17  3:23 AM   Result Value Ref Range    Sodium 141 136 - 145 mmol/L    Potassium 5.0 3.5 - 5.1 mmol/L    Chloride 110 95 - 110 mmol/L    CO2 24 23 - 29 mmol/L    Glucose 77 70 - 110 mg/dL    BUN, Bld 5 5 - 18 mg/dL    Creatinine 0.4 (L) 0.5 - 1.4 mg/dL    Calcium 9.2 8.7 - 10.5 mg/dL    Anion Gap 7 (L) 8 - 16 mmol/L    eGFR if  SEE COMMENT >60 mL/min/1.73 m^2    eGFR if non  SEE COMMENT >60 mL/min/1.73 m^2   Magnesium    Collection Time: 11/20/17  3:23 AM   Result Value Ref Range    Magnesium 1.7 1.6 - 2.6 mg/dL   Phosphorus    Collection Time: 11/20/17  3:23 AM   Result Value Ref Range    Phosphorus 4.8 4.5 - 6.7 mg/dL   ]    Chest X-Ray: .    Narrative     Chest single view compared to November 18.  Radiograph is lordotic in projection.  Unfortunately difficult to visualize the tracheal air shadow to assess for positioning of the ET tube.  Lungs are hypoaerated.  No confluent consolidation seen.   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 Extubated , re-intubated for PEG tube surgery and re-extubated .       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 Will require a methadone taper. 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 Distraction adjustment course completed. Removal scheduled outpatient on  @ 7am.  -Monitor dressings behind b/l ears cover activation arms, and if soaked will call Dr. Mcpherson  -Clean bl/ neck incisions and the exit site of the activation arm 1x per shift  -Bactroban on incisions and activation site    #Congenital small ear canal  -ENT consulted, appreciate recs.   -Need to get Hearing screen before d/c  -F/u if passed  hearing screen  -Per ENT:  Will likely need bone conduction hearing aid until canals enlarge.      -Will need auditory brainstem response test evaluation so Bone Anchored Hearing Aid implants can be fitted: will defer for now. Will be done at [place].   - Off oxygen  -F/U ENT appointment made for Dec 1st with Dr. [place] @ 1pm.     #Nutrition  -Breast milk fortified to 22kcal/oz @ 19mL/hr via G tube.   -Vitamin D daily  -Speech following  -following electrolytes with daily CMP, Mg, Phos   -Feeds adjusted to be q3 boluses from 9am to 6pm and then continuous feeds from 8pm to 7am. Will monitor to see how he tolerates.     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     #Plastic:  NG tube, L Fem central line   #Dispo:  Tolerating feeds, after placement of G-tube. Per recs from Plastics            Follow-up Information     Schedule an appointment as soon as possible for a visit with David Pack MD.    Specialties:  Pediatrics, Pediatric Gastroenterology  Contact information:  [address] [address]  [phone]                   Anticipated Disposition: Home or Self Care    Constance Alexis MD  Pediatric Hospital Medicine   [facility]

## 2017-07-27 NOTE — PROGRESS NOTES
Bedside shift change report given to Lisy Aguilar (oncoming nurse) by Muna Clayton (offgoing nurse). Report included the following information SBAR, Kardex, OR Summary, Procedure Summary, Intake/Output, MAR and Recent Results.    }

## 2017-07-27 NOTE — PROGRESS NOTES
Music Therapy Assessment    Mar Bumpers [id number]  [id number]    1975  46 y.o.  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    Abdominal pain 06/25/2017    Perforated bowel ([address] 06/06/2017    Right flank pain 08/22/2011    Crohn's disease ([facility] 08/15/2011    DVT (deep venous thrombosis) ([facility] 08/15/2011    Incarcerated ventral hernia 08/15/2011        Date: 7/27/2017       Mental Status:   [x] Alert [  ] Aldona Bump [  ]  Confused  [  ] Minimally responsive    Communication Status: [  ] Impaired Speech [  ] Nonverbal     Physical Status:   [  ] Oxygen in use  [  ] Hard of Hearing [  ] Vision Impaired  [  ] Ambulatory  [  ] Ambulatory with assistance [  ] Non-ambulatory -N/A: Please see Session Observations below. Music Preferences, Background: , [address], [address], [address] [facility], [facility] [address] [facility] and [place]; The patient sang in the school choir, and took guitar and cello lessons.     Clinical Problem addressed: Support healthy coping, encourage self-expression, decrease feelings of stress, improve mood, positive social interaction. Goal(s) met in session:  Physical/Pain management (Scale of 1-10):    Pre-session rating: Pt reported pain, but didn't rate when asked. Post-session rating: Pt reported pain around a wound.    [x] Increased relaxation   [  ] Regulated breathing patterns  [  ] Decreased muscle tension   [  ] Minimized physical distress     Emotional/Psychological:  [x] Increased self-expression   [  ] Decreased aggressive behavior   [  ] Decreased sadness   [x] Discussed healthy coping skills     [  ] Improved mood    [  ] Decreased withdrawn behavior     Social:  [x] Decreased feelings of isolation/loneliness [x] Positive social interaction   [  ] Provided support and/or comfort for family/friends    Spiritual:  [x] Spiritual support    [  ] Expressed peace  [  ] Expressed jalen    [  ] Discussed beliefs    Techniques Utilized (Check all that apply):   [  ] Procedural support MT [x] Music for relaxation [x] Patient preferred music  [  ] Szuie analysis  [  ] Song choice  [  ] Music for validation  [  ] Entrainment  [  ] Progressive muscle relax. [  ] Guided visualization  [  ] Rebeka Rosenberg  [  ] Patient instrument playing [  ] William Parkinson writing  [x] Sing along   [  ] Autumn Schmitz  [  ] Sensory stimulation  [x] Active Listening  [  ] Music for spiritual support [  ] Making of CDs as gifts    Session Observations:  F/u visit; The patient (pt) was observed to have a sad affect sitting in a chair. She shared about frustrations she's experienced with her health and expressed she felt like she needed to get more good news. This music therapist (MT) provided active listening and a supportive presence. The MT suggested singing as an outlet, and the pt was receptive. The MT gave the pt a suzie sheet to a song the pt said she liked, Rolling in the Deep by Toshia. The MT led the singing a capella (vocals only without instrumental accompaniment). The pt listened and then sang along for half the song. At the end of the song, the pt wept. She said she feels like she's in a constant state of fear about her health and what ifs. The MT provided active listening and then offered words of validation and encouragement. The pt said she doesn't think she's strong, but when asked to define what being strong meant to her, the pt said she could see that she's been brave at least in some moments.  The MT asked the pt questions about what's helped her cope in the past when she's felt fear or needed comfort. The pt said she likes to journal, but doesn't think it \"counts\" because she doesn't do it as regularly as she feels she should. The MT invited the pt to allow for flexibility in journaling and shared that some people only journal in times of crisis. Then the MT offered music for relaxation. The pt closed her eyes and increased relaxation in response to being led through deep breathing, mindfulness, and the MT singing softly and slowly a song with comforting lyrics. The pt's breathing slowed and her facial expression relaxed. She expressed gratitude for the session. Will follow as able.     Joaquim Gaines MT-BC (Music Therapist-Board Certified)  Spiritual Care Department  Referral-based service

## 2017-07-27 NOTE — PROGRESS NOTES
Bedside shift change report given to [facility] (oncoming nurse) by Vicente Dominguez (offgoing nurse). Report included the following information SBAR, MAR, Recent Results and Cardiac Rhythm NSR.

## 2017-07-28 LAB
ANION GAP BLD CALC-SCNC: 7 MMOL/L (ref 5–15)
BASOPHILS # BLD AUTO: 0 K/UL (ref 0–0.1)
BASOPHILS # BLD: 0 % (ref 0–1)
BUN SERPL-MCNC: 21 MG/DL (ref 6–20)
BUN/CREAT SERPL: 24 (ref 12–20)
CALCIUM SERPL-MCNC: 8.3 MG/DL (ref 8.5–10.1)
CHLORIDE SERPL-SCNC: 107 MMOL/L (ref 97–108)
CO2 SERPL-SCNC: 25 MMOL/L (ref 21–32)
CREAT SERPL-MCNC: 0.87 MG/DL (ref 0.55–1.02)
DIFFERENTIAL METHOD BLD: ABNORMAL
EOSINOPHIL # BLD: 0.2 K/UL (ref 0–0.4)
EOSINOPHIL NFR BLD: 2 % (ref 0–7)
ERYTHROCYTE [DISTWIDTH] IN BLOOD BY AUTOMATED COUNT: 17.1 % (ref 11.5–14.5)
GLUCOSE BLD STRIP.AUTO-MCNC: 109 MG/DL (ref 65–100)
GLUCOSE BLD STRIP.AUTO-MCNC: 133 MG/DL (ref 65–100)
GLUCOSE BLD STRIP.AUTO-MCNC: 145 MG/DL (ref 65–100)
GLUCOSE SERPL-MCNC: 114 MG/DL (ref 65–100)
HCT VFR BLD AUTO: 15.1 % (ref 35–47)
HCT VFR BLD AUTO: 22.3 % (ref 35–47)
HGB BLD-MCNC: 4.6 G/DL (ref 11.5–16)
HGB BLD-MCNC: 6.9 G/DL (ref 11.5–16)
LYMPHOCYTES # BLD AUTO: 31 % (ref 12–49)
LYMPHOCYTES # BLD: 3.5 K/UL (ref 0.8–3.5)
MAGNESIUM SERPL-MCNC: 2 MG/DL (ref 1.6–2.4)
MCH RBC QN AUTO: 27.7 PG (ref 26–34)
MCHC RBC AUTO-ENTMCNC: 30.5 G/DL (ref 30–36.5)
MCV RBC AUTO: 91 FL (ref 80–99)
MONOCYTES # BLD: 0.9 K/UL (ref 0–1)
MONOCYTES NFR BLD AUTO: 8 % (ref 5–13)
MYELOCYTES NFR BLD MANUAL: 1 %
NEUTS SEG # BLD: 6.6 K/UL (ref 1.8–8)
NEUTS SEG NFR BLD AUTO: 58 % (ref 32–75)
NRBC # BLD: 0.04 K/UL (ref 0–0.01)
NRBC BLD-RTO: 0.4 PER 100 WBC
PHOSPHATE SERPL-MCNC: 4.1 MG/DL (ref 2.6–4.7)
PLATELET # BLD AUTO: 407 K/UL (ref 150–400)
PLATELET COMMENTS,PCOM: ABNORMAL
POTASSIUM SERPL-SCNC: 3.8 MMOL/L (ref 3.5–5.1)
RBC # BLD AUTO: 1.66 M/UL (ref 3.8–5.2)
RBC MORPH BLD: ABNORMAL
SERVICE CMNT-IMP: ABNORMAL
SODIUM SERPL-SCNC: 139 MMOL/L (ref 136–145)
WBC # BLD AUTO: 11.3 K/UL (ref 3.6–11)
WBC MORPH BLD: ABNORMAL
WBC NRBC COR # BLD: ABNORMAL 10*3/UL

## 2017-07-28 PROCEDURE — P9016 RBC LEUKOCYTES REDUCED: HCPCS | Performed by: SURGERY

## 2017-07-28 PROCEDURE — 74011000258 HC RX REV CODE- 258: Performed by: INTERNAL MEDICINE

## 2017-07-28 PROCEDURE — 85025 COMPLETE CBC W/AUTO DIFF WBC: CPT | Performed by: SURGERY

## 2017-07-28 PROCEDURE — 74011000258 HC RX REV CODE- 258: Performed by: PHYSICIAN ASSISTANT

## 2017-07-28 PROCEDURE — 74011000250 HC RX REV CODE- 250: Performed by: NURSE PRACTITIONER

## 2017-07-28 PROCEDURE — 86902 BLOOD TYPE ANTIGEN DONOR EA: CPT | Performed by: SURGERY

## 2017-07-28 PROCEDURE — 74011636637 HC RX REV CODE- 636/637: Performed by: PHYSICIAN ASSISTANT

## 2017-07-28 PROCEDURE — 74011250637 HC RX REV CODE- 250/637: Performed by: SURGERY

## 2017-07-28 PROCEDURE — 86900 BLOOD TYPING SEROLOGIC ABO: CPT | Performed by: SURGERY

## 2017-07-28 PROCEDURE — 97116 GAIT TRAINING THERAPY: CPT | Performed by: PHYSICAL THERAPIST

## 2017-07-28 PROCEDURE — 77030019952 HC CANSTR VAC ASST KCON -B

## 2017-07-28 PROCEDURE — 3331090002 HH PPS REVENUE DEBIT

## 2017-07-28 PROCEDURE — 74011250636 HC RX REV CODE- 250/636: Performed by: EMERGENCY MEDICINE

## 2017-07-28 PROCEDURE — 74011250636 HC RX REV CODE- 250/636: Performed by: PHYSICAL MEDICINE & REHABILITATION

## 2017-07-28 PROCEDURE — 74011250636 HC RX REV CODE- 250/636: Performed by: PHYSICIAN ASSISTANT

## 2017-07-28 PROCEDURE — C9113 INJ PANTOPRAZOLE SODIUM, VIA: HCPCS | Performed by: SURGERY

## 2017-07-28 PROCEDURE — 86880 COOMBS TEST DIRECT: CPT | Performed by: SURGERY

## 2017-07-28 PROCEDURE — 85018 HEMOGLOBIN: CPT | Performed by: SURGERY

## 2017-07-28 PROCEDURE — 80048 BASIC METABOLIC PNL TOTAL CA: CPT | Performed by: SURGERY

## 2017-07-28 PROCEDURE — 74011636637 HC RX REV CODE- 636/637: Performed by: SURGERY

## 2017-07-28 PROCEDURE — 86870 RBC ANTIBODY IDENTIFICATION: CPT | Performed by: SURGERY

## 2017-07-28 PROCEDURE — 74011000250 HC RX REV CODE- 250: Performed by: SURGERY

## 2017-07-28 PROCEDURE — 83735 ASSAY OF MAGNESIUM: CPT | Performed by: SURGERY

## 2017-07-28 PROCEDURE — 74011000250 HC RX REV CODE- 250: Performed by: PHYSICIAN ASSISTANT

## 2017-07-28 PROCEDURE — 3331090001 HH PPS REVENUE CREDIT

## 2017-07-28 PROCEDURE — 65660000000 HC RM CCU STEPDOWN

## 2017-07-28 PROCEDURE — 74011000258 HC RX REV CODE- 258: Performed by: SURGERY

## 2017-07-28 PROCEDURE — 86922 COMPATIBILITY TEST ANTIGLOB: CPT | Performed by: SURGERY

## 2017-07-28 PROCEDURE — 74011250636 HC RX REV CODE- 250/636: Performed by: INTERNAL MEDICINE

## 2017-07-28 PROCEDURE — 82962 GLUCOSE BLOOD TEST: CPT

## 2017-07-28 PROCEDURE — 86920 COMPATIBILITY TEST SPIN: CPT | Performed by: SURGERY

## 2017-07-28 PROCEDURE — 86921 COMPATIBILITY TEST INCUBATE: CPT | Performed by: SURGERY

## 2017-07-28 PROCEDURE — 74011250636 HC RX REV CODE- 250/636: Performed by: NURSE PRACTITIONER

## 2017-07-28 PROCEDURE — 74011250636 HC RX REV CODE- 250/636: Performed by: SURGERY

## 2017-07-28 PROCEDURE — 36430 TRANSFUSION BLD/BLD COMPNT: CPT

## 2017-07-28 PROCEDURE — 84100 ASSAY OF PHOSPHORUS: CPT | Performed by: SURGERY

## 2017-07-28 PROCEDURE — 86860 RBC ANTIBODY ELUTION: CPT | Performed by: SURGERY

## 2017-07-28 PROCEDURE — 36415 COLL VENOUS BLD VENIPUNCTURE: CPT | Performed by: SURGERY

## 2017-07-28 RX ORDER — SODIUM CHLORIDE 9 MG/ML
250 INJECTION, SOLUTION INTRAVENOUS AS NEEDED
Status: DISCONTINUED | OUTPATIENT
Start: 2017-07-28 | End: 2017-08-24 | Stop reason: HOSPADM

## 2017-07-28 RX ADMIN — PIPERACILLIN SODIUM,TAZOBACTAM SODIUM 3.38 G: 3; .375 INJECTION, POWDER, FOR SOLUTION INTRAVENOUS at 06:40

## 2017-07-28 RX ADMIN — VANCOMYCIN HYDROCHLORIDE 1500 MG: 10 INJECTION, POWDER, LYOPHILIZED, FOR SOLUTION INTRAVENOUS at 14:58

## 2017-07-28 RX ADMIN — ONDANSETRON 8 MG: 2 INJECTION INTRAMUSCULAR; INTRAVENOUS at 09:09

## 2017-07-28 RX ADMIN — OCTREOTIDE ACETATE 50 MCG: 50 INJECTION, SOLUTION INTRAVENOUS; SUBCUTANEOUS at 09:09

## 2017-07-28 RX ADMIN — POTASSIUM CHLORIDE: 2 INJECTION, SOLUTION, CONCENTRATE INTRAVENOUS at 18:03

## 2017-07-28 RX ADMIN — OCTREOTIDE ACETATE 50 MCG: 50 INJECTION, SOLUTION INTRAVENOUS; SUBCUTANEOUS at 22:20

## 2017-07-28 RX ADMIN — Medication 10 ML: at 06:42

## 2017-07-28 RX ADMIN — PIPERACILLIN SODIUM,TAZOBACTAM SODIUM 3.38 G: 3; .375 INJECTION, POWDER, FOR SOLUTION INTRAVENOUS at 12:36

## 2017-07-28 RX ADMIN — OCTREOTIDE ACETATE 50 MCG: 50 INJECTION, SOLUTION INTRAVENOUS; SUBCUTANEOUS at 15:10

## 2017-07-28 RX ADMIN — ONDANSETRON 8 MG: 2 INJECTION INTRAMUSCULAR; INTRAVENOUS at 14:57

## 2017-07-28 RX ADMIN — Medication 0.76 MG: at 14:00

## 2017-07-28 RX ADMIN — ASPIRIN 81 MG CHEWABLE TABLET 81 MG: 81 TABLET CHEWABLE at 08:21

## 2017-07-28 RX ADMIN — Medication 0.76 MG: at 21:11

## 2017-07-28 RX ADMIN — Medication 10 ML: at 14:57

## 2017-07-28 RX ADMIN — Medication 10 ML: at 22:06

## 2017-07-28 RX ADMIN — Medication: at 12:23

## 2017-07-28 RX ADMIN — SODIUM CHLORIDE 100 MG: 900 INJECTION, SOLUTION INTRAVENOUS at 18:06

## 2017-07-28 RX ADMIN — Medication 20 ML: at 09:13

## 2017-07-28 RX ADMIN — Medication 10 ML: at 22:00

## 2017-07-28 RX ADMIN — HYDROMORPHONE HYDROCHLORIDE 1 MG: 1 INJECTION, SOLUTION INTRAMUSCULAR; INTRAVENOUS; SUBCUTANEOUS at 21:11

## 2017-07-28 RX ADMIN — PIPERACILLIN SODIUM,TAZOBACTAM SODIUM 3.38 G: 3; .375 INJECTION, POWDER, FOR SOLUTION INTRAVENOUS at 22:05

## 2017-07-28 RX ADMIN — Medication 0.76 MG: at 06:40

## 2017-07-28 RX ADMIN — Medication: at 18:05

## 2017-07-28 RX ADMIN — Medication 10 ML: at 16:17

## 2017-07-28 RX ADMIN — I.V. FAT EMULSION 500 ML: 20 EMULSION INTRAVENOUS at 18:24

## 2017-07-28 RX ADMIN — CLOPIDOGREL BISULFATE 75 MG: 75 TABLET ORAL at 08:21

## 2017-07-28 RX ADMIN — HUMAN INSULIN 2 UNITS: 100 INJECTION, SOLUTION SUBCUTANEOUS at 23:33

## 2017-07-28 RX ADMIN — SODIUM CHLORIDE 40 MG: 9 INJECTION INTRAMUSCULAR; INTRAVENOUS; SUBCUTANEOUS at 08:21

## 2017-07-28 RX ADMIN — Medication: at 07:53

## 2017-07-28 NOTE — WOUND CARE
Seen late yesterday afternoon with a VAC change to abdomen with Dr. Milady Rendon. Enteric contents noted in cannister - some leaking around proximal drain insertion site in wound bed. VAC dressing replaced. VAC check today with good seal and managing green effluent. Cannister changed. Will continue to follow with VAC change on Monday.   AMPARO Gamble

## 2017-07-28 NOTE — PROGRESS NOTES
Deferred visit: Patient is currently with wound care and is then to have a transfusion. Will continue to follow.

## 2017-07-28 NOTE — PROGRESS NOTES
Problem: Mobility Impaired (Adult and Pediatric)  Goal: *Acute Goals and Plan of Care (Insert Text)  Physical Therapy Goals  Revised 7/28/2017  1. Patient will transfer from bed to chair and chair to bed with modified independence using the least restrictive device within 7 day(s). 2. Patient will perform sit to stand with modified independence within 7 day(s). 3. Patient will ambulate with modified independence for 250 feet with the least restrictive device within 7 day(s). Physical Therapy Goals  Revised 7/20/2017  1. Patient will move from supine to sit and sit to supine , scoot up and down and roll side to side in bed with independence within 7 day(s). 2. Patient will transfer from bed to chair and chair to bed with supervision/set-up using the least restrictive device within 7 day(s). 3. Patient will perform sit to stand with supervision/set-up within 7 day(s). 4. Patient will ambulate with supervision/set-up for 200 feet with the least restrictive device within 7 day(s). 5. Patient will ascend/descend 7 stairs with one handrail(s) with supervision/set-up within 7 day(s). Physical Therapy Goals  7/12/2017  1. Patient will move from supine to sit and sit to supine , scoot up and down and roll side to side in bed with independence within 7 day(s). 2. Patient will transfer from bed to chair and chair to bed with supervision/set-up using the least restrictive device within 7 day(s). 3. Patient will perform sit to stand with supervision/set-up within 7 day(s). 4. Patient will ambulate with supervision/set-up for 50 feet with the least restrictive device within 7 day(s). PHYSICAL THERAPY TREATMENT: WEEKLY REASSESSMENT  Patient:  Johana Hobbs (46 y.o. female)  Date: 7/28/2017  Diagnosis: N/V intractable post-opt pain  Abdominal pain  poor iv access  DEAD BOWEL  SMALL BOWEL OBSTRUCTION  central line placement  Abdominal pain <principal problem not specified>  Procedure(s) (LRB):  SPECIAL PROCEDURE OUTSIDE OF OR (N/A) 15 Days Post-Op  Precautions:        ASSESSMENT:  Patient continues to make progress with gait and mobility. She is independent with bed mobility but needs help with her drains and IV's. Sit to stand independently. Today she ambulated for 5 minutes in the room for 6 laps or 144 feet. Minimal fatigue with this exertion. Uses no AD with good balance. Requires assist of 2 due to all her drains and lines. Cooperative and motivated to progress. Patient's progression toward goals since last assessment: Continues to slowly increase her distance ambulated. PLAN:  Goals have been updated based on progression since last assessment. Patient continues to benefit from skilled intervention to address the above impairments. Continue to follow the patient 5 times a week to address goals. Planned Interventions:  [ ]              Bed Mobility Training             [ ]       Neuromuscular Re-Education  [ ]              Transfer Training                   [ ]       Orthotic/Prosthetic Training  [X]              Gait Training                         [ ]       Modalities  [X]              Therapeutic Exercises           [ ]       Edema Management/Control  [ ]              Therapeutic Activities            [X]       Patient and Family Training/Education  [ ]              Other (comment):  Discharge Recommendations: To Be Determined  Further Equipment Recommendations for Discharge: none       SUBJECTIVE:   Patient stated Elex Memos can go now. I am getting blood in a little while. Kiet Smith      OBJECTIVE DATA SUMMARY:   Critical Behavior:  Neurologic State: Alert  Orientation Level: Oriented X4  Cognition: Appropriate decision making, Appropriate for age attention/concentration, Appropriate safety awareness        Strength:          WDL                 Functional Mobility Training:  Bed Mobility:     Supine to Sit: Supervision  Sit to Supine: Supervision  Scooting: Independent Transfers:  Sit to Stand: Stand-by asssistance  Stand to Sit: Stand-by asssistance                             Balance:  Sitting: Intact  Standing: Intact  Standing - Static: Good  Standing - Dynamic : Good  Ambulation/Gait Training:  Distance (ft): 144 Feet (ft)  Assistive Device: Other (comment) (none)  Ambulation - Level of Assistance: Supervision        Gait Abnormalities: Decreased step clearance        Base of Support: Widened     Speed/Liz: Slow  Step Length: Left shortened;Right shortened                             Wide based gait. Pain:  Pain Scale 1: Numeric (0 - 10) (PCA)  Pain Intensity 1: 0  Pain Location 1: Abdomen  Pain Orientation 1: Anterior  Pain Description 1: Aching  Pain Intervention(s) 1: Encouraged PCA  Activity Tolerance:   Good. Please refer to the flowsheet for vital signs taken during this treatment. After treatment:   [ ]  Patient left in no apparent distress sitting up in chair  [X]  Patient left in no apparent distress in bed  [X]  Call bell left within reach  [X]  Nursing notified  [X]  Caregiver present-mother present.    [ ]  Bed alarm activated      COMMUNICATION/COLLABORATION:   The patients plan of care was discussed with: Registered Nurse and Rehabilitation Attendant     Meño Kearney PT   Time Calculation: 20 mins

## 2017-07-28 NOTE — PROGRESS NOTES
10:59 recalled blood bank; blood will be ready in an [place] Christian, [facility]

## 2017-07-28 NOTE — PROGRESS NOTES
Daily Progress Note  [facility] General Surgery at [address] Date: 2017  Post-Op:   17: Ex laparotomy with Soledad, fistula exclusion with feeding tube placement, frozen abdomen, wound vac assisted closure placement. - Rajni  17: Ex laparotomy with extensive Soledad, repair of enterotomy, SMA stent on the left. - Soumya/Dung/Denny  Subjective:     Last 24 hrs: Major complaint is pain around right and left malecot drain sites. Otherwise doing well, ambulating, taking small sips of water. She is awaiting transfusion, Hgb 6.9. Continues on TPN, lipid, octreotide, Eraxis, zosyn, and Vancomycin. WBC 11.3, T max 99. Objective:     Blood pressure 116/53, pulse 94, temperature 98.1 °F (36.7 °C), resp. rate 16, height 5' 4\" (1.626 m), weight 152 kg (335 lb 1.6 oz), SpO2 100 %, not currently breastfeeding. Temp (24hrs), Av.4 °F (36.9 °C), Min:97.9 °F (36.6 °C), Max:99 °F (37.2 °C)      _____________________  Physical Exam:     Alert and Oriented, lying in bed, in no acute distress. Cardiovascular: RRR  Lungs:CTAB   Abdomen: soft, tender around drain sites. Drainage for yesterday:  Wound vac = 700 mL   malecot = 1425 mL  Malecot = 50 mL  Ileostomy= 0  Rabia = 0    Assessment:   Active Problems:    Abdominal pain (2017)      Small bowel ischemia ([address] (2017)      Overview: CT abd/pelvis 17      1. The stomach and proximal small bowel loops are distended. There is a       loop of      small bowel in the midabdomen which is mildly dilated and does not       demonstrate      enhancement in the wall and there is suspicion of pneumatosis and this       leads to      a loop of small bowel which demonstrates thickening of the wall and       findings are      suspicious for ischemia. 2. There is extensive mesenteric edema and a small amount of ascites in       the      pelvis.       Superior mesenteric artery thrombosis ([address] (2017)      Overview: CT abd/pelvis 17: 3. There is nonocclusive thrombus in the SMA. 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)            Plan:     Continue cycled TPN/lipid/octreotide  Abx per ID - Agree with attempting to wean at this point  OOB  Continue wound vac  Plavix for anticoagulation        Mary Beth Reddy, [address]  Surgery at [facility],  MOB Winnie, [address] [place], [place]  [phone]    Data Review:    Recent Labs      07/28/17   2611  07/28/17   0332  07/27/17   0324  07/26/17   0517   WBC   --   11.3*  8.7  9.0   HGB  6.9*  4.6*  6.4*  7.5*   HCT  22.3*  15.1*  20.3*  24.3*   PLT   --   407*  327  383     Recent Labs      07/28/17   0332  07/27/17 0324 07/26/17   0517   NA  139  140  138   K  3.8  3.9  4.1   CL  107  109*  107   CO2  25  24  25   GLU  114*  130*  73   BUN  21*  22*  23*   CREA  0.87  0.89  0.91   CA  8.3*  8.1*  8.6   MG  2.0  1.8  2.0   PHOS  4.1  4.1  4.3     No results for input(s): AML, LPSE in the last 72 hours. ______________________  Medications:    Current Facility-Administered Medications   Medication Dose Route Frequency    0.9% sodium chloride infusion 250 mL  250 mL IntraVENous PRN    [START ON 7/29/2017] vancomycin trough 7/29 @ 13:00 - Thanks!    Other ONCE    TPN ADULT - CENTRAL   IntraVENous TITRATE    vancomycin (VANCOCIN) 1500 mg in  ml infusion  1,500 mg IntraVENous Q24H    acetaminophen (TYLENOL) tablet 650 mg  650 mg Oral Q4H PRN    LORazepam (ATIVAN) injection 0.76 mg  0.76 mg IntraVENous Q8H    albuterol (PROVENTIL VENTOLIN) nebulizer solution 2.5 mg  2.5 mg Inhalation Q4H PRN    octreotide (SANDOSTATIN) injection 50 mcg  50 mcg IntraVENous TID    clopidogrel (PLAVIX) tablet 75 mg  75 mg Oral DAILY    sodium chloride (NS) flush 10 mL  10 mL InterCATHeter Q24H    sodium chloride (NS) flush 10 mL  10 mL InterCATHeter PRN    sodium chloride (NS) flush 10-40 mL  10-40 mL InterCATHeter Q8H    alteplase (CATHFLO) 1 mg in sterile water (preservative free) 1 mL injection  1 mg InterCATHeter PRN    bacitracin 500 unit/gram packet 1 Packet  1 Packet Topical PRN    0.9% sodium chloride infusion 250 mL  250 mL IntraVENous PRN    lactated Ringers infusion  25 mL/hr IntraVENous CONTINUOUS    fat emulsion 20% (LIPOSYN, INTRALIPID) infusion 500 mL  500 mL IntraVENous Q MON, WED & FRI    HYDROmorphone (PF) (DILAUDID) injection 1 mg  1 mg IntraVENous Q1H PRN    aspirin chewable tablet 81 mg  81 mg Oral DAILY    insulin regular (NOVOLIN R, HUMULIN R) injection   SubCUTAneous Q6H    morphine (PF)  mg/30 ml   IntraVENous CONTINUOUS    glucose chewable tablet 16 g  4 Tab Oral PRN    glucagon (GLUCAGEN) injection 1 mg  1 mg IntraMUSCular PRN    dextrose 10 % infusion 125-250 mL  125-250 mL IntraVENous PRN    pantoprazole (PROTONIX) 40 mg in sodium chloride 0.9 % 10 mL injection  40 mg IntraVENous DAILY    prochlorperazine (COMPAZINE) with saline injection 5 mg  5 mg IntraVENous Q6H PRN    anidulafungin (ERAXIS) 100 mg in 0.9% sodium chloride 130 mL IVPB  100 mg IntraVENous Q24H    ondansetron (ZOFRAN) injection 8 mg  8 mg IntraVENous Q6H PRN    sodium chloride (NS) flush 5-10 mL  5-10 mL IntraVENous Q8H    sodium chloride (NS) flush 5-10 mL  5-10 mL IntraVENous PRN    naloxone (NARCAN) injection 0.4 mg  0.4 mg IntraVENous PRN    diphenhydrAMINE (BENADRYL) injection 12.5 mg  12.5 mg IntraVENous Q4H PRN    piperacillin-tazobactam (ZOSYN) 3.375 g in 0.9% sodium chloride (MBP/ADV) 100 mL  3.375 g IntraVENous Q8H    Vancomycin ---Pharmacy to Dose   Other Rx Dosing/Monitoring

## 2017-07-28 NOTE — PROGRESS NOTES
Bedside shift change report given to Triston Martinez RN  (oncoming nurse) by Seth Delgado RN  (offgoing nurse). Report included the following information SBAR and MAR.   Cardiac Rhythm: Sinus

## 2017-07-28 NOTE — PROGRESS NOTES
Problem: Falls - Risk of  Goal: *Absence of falls  Outcome: Progressing Towards Goal  Patient is working with Daily

## 2017-07-28 NOTE — PROGRESS NOTES
Bedside and Verbal shift change report given to Maria Dolores Mello (oncoming nurse) by Edith Gamboa (offgoing nurse). Report included the following information SBAR, Kardex, OR Summary, Procedure Summary, Intake/Output, MAR, Recent Results and Cardiac Rhythm nsr/sinus tach.

## 2017-07-29 LAB
ABO + RH BLD: NORMAL
ANION GAP BLD CALC-SCNC: 6 MMOL/L (ref 5–15)
ANTI-COMPLEMENT (C3B,C3D): NORMAL
ANTIGENS PRESENT RBC DONR: NORMAL
BASOPHILS # BLD AUTO: 0.1 K/UL (ref 0–0.1)
BASOPHILS # BLD: 1 % (ref 0–1)
BLD GP AB SCN SERPL QL ELUTION: NORMAL
BLD PROD TYP BPU: NORMAL
BLOOD GROUP ANTIBODIES SERPL: NORMAL
BLOOD GROUP ANTIBODIES SERPL: NORMAL
BPU ID: NORMAL
BUN SERPL-MCNC: 22 MG/DL (ref 6–20)
BUN/CREAT SERPL: 24 (ref 12–20)
CALCIUM SERPL-MCNC: 8.2 MG/DL (ref 8.5–10.1)
CHLORIDE SERPL-SCNC: 108 MMOL/L (ref 97–108)
CO2 SERPL-SCNC: 24 MMOL/L (ref 21–32)
CREAT SERPL-MCNC: 0.91 MG/DL (ref 0.55–1.02)
CROSSMATCH RESULT,%XM: NORMAL
DAT IGG-SP REAG RBC QL: NORMAL
DAT POLY-SP REAG RBC QL: NORMAL
DATE LAST DOSE: ABNORMAL
DIFFERENTIAL METHOD BLD: ABNORMAL
EOSINOPHIL # BLD: 0.6 K/UL (ref 0–0.4)
EOSINOPHIL NFR BLD: 6 % (ref 0–7)
ERYTHROCYTE [DISTWIDTH] IN BLOOD BY AUTOMATED COUNT: 16.9 % (ref 11.5–14.5)
GLUCOSE BLD STRIP.AUTO-MCNC: 111 MG/DL (ref 65–100)
GLUCOSE BLD STRIP.AUTO-MCNC: 141 MG/DL (ref 65–100)
GLUCOSE BLD STRIP.AUTO-MCNC: 96 MG/DL (ref 65–100)
GLUCOSE SERPL-MCNC: 121 MG/DL (ref 65–100)
HCT VFR BLD AUTO: 23.5 % (ref 35–47)
HGB BLD-MCNC: 7.5 G/DL (ref 11.5–16)
LYMPHOCYTES # BLD AUTO: 24 % (ref 12–49)
LYMPHOCYTES # BLD: 2.6 K/UL (ref 0.8–3.5)
MAGNESIUM SERPL-MCNC: 2 MG/DL (ref 1.6–2.4)
MCH RBC QN AUTO: 29.3 PG (ref 26–34)
MCHC RBC AUTO-ENTMCNC: 31.9 G/DL (ref 30–36.5)
MCV RBC AUTO: 91.8 FL (ref 80–99)
MONOCYTES # BLD: 0.9 K/UL (ref 0–1)
MONOCYTES NFR BLD AUTO: 8 % (ref 5–13)
MYELOCYTES NFR BLD MANUAL: 1 %
NEUTS BAND NFR BLD MANUAL: 1 % (ref 0–6)
NEUTS SEG # BLD: 6.5 K/UL (ref 1.8–8)
NEUTS SEG NFR BLD AUTO: 59 % (ref 32–75)
PHOSPHATE SERPL-MCNC: 3.8 MG/DL (ref 2.6–4.7)
PLATELET # BLD AUTO: 323 K/UL (ref 150–400)
POTASSIUM SERPL-SCNC: 4 MMOL/L (ref 3.5–5.1)
RBC # BLD AUTO: 2.56 M/UL (ref 3.8–5.2)
RBC MORPH BLD: ABNORMAL
REPORTED DOSE,DOSE: ABNORMAL UNITS
REPORTED DOSE/TIME,TMG: ABNORMAL
SERVICE CMNT-IMP: ABNORMAL
SERVICE CMNT-IMP: ABNORMAL
SERVICE CMNT-IMP: NORMAL
SODIUM SERPL-SCNC: 138 MMOL/L (ref 136–145)
SPECIMEN EXP DATE BLD: NORMAL
STATUS OF UNIT,%ST: NORMAL
UNIT DIVISION, %UDIV: 0
VANCOMYCIN TROUGH SERPL-MCNC: 12.3 UG/ML (ref 5–10)
WBC # BLD AUTO: 10.8 K/UL (ref 3.6–11)
WBC MORPH BLD: ABNORMAL

## 2017-07-29 PROCEDURE — 74011000258 HC RX REV CODE- 258: Performed by: SURGERY

## 2017-07-29 PROCEDURE — 74011250636 HC RX REV CODE- 250/636: Performed by: INTERNAL MEDICINE

## 2017-07-29 PROCEDURE — 80048 BASIC METABOLIC PNL TOTAL CA: CPT | Performed by: SURGERY

## 2017-07-29 PROCEDURE — 74011250637 HC RX REV CODE- 250/637: Performed by: SURGERY

## 2017-07-29 PROCEDURE — 80202 ASSAY OF VANCOMYCIN: CPT | Performed by: INTERNAL MEDICINE

## 2017-07-29 PROCEDURE — 84100 ASSAY OF PHOSPHORUS: CPT | Performed by: SURGERY

## 2017-07-29 PROCEDURE — 3331090001 HH PPS REVENUE CREDIT

## 2017-07-29 PROCEDURE — 74011250636 HC RX REV CODE- 250/636: Performed by: SURGERY

## 2017-07-29 PROCEDURE — C9113 INJ PANTOPRAZOLE SODIUM, VIA: HCPCS | Performed by: SURGERY

## 2017-07-29 PROCEDURE — 36415 COLL VENOUS BLD VENIPUNCTURE: CPT | Performed by: SURGERY

## 2017-07-29 PROCEDURE — 83735 ASSAY OF MAGNESIUM: CPT | Performed by: SURGERY

## 2017-07-29 PROCEDURE — 82962 GLUCOSE BLOOD TEST: CPT

## 2017-07-29 PROCEDURE — 74011000250 HC RX REV CODE- 250: Performed by: SURGERY

## 2017-07-29 PROCEDURE — 74011000250 HC RX REV CODE- 250: Performed by: PHYSICIAN ASSISTANT

## 2017-07-29 PROCEDURE — 74011000250 HC RX REV CODE- 250: Performed by: INTERNAL MEDICINE

## 2017-07-29 PROCEDURE — 3331090002 HH PPS REVENUE DEBIT

## 2017-07-29 PROCEDURE — 74011636637 HC RX REV CODE- 636/637: Performed by: PHYSICIAN ASSISTANT

## 2017-07-29 PROCEDURE — 74011250637 HC RX REV CODE- 250/637: Performed by: PHYSICIAN ASSISTANT

## 2017-07-29 PROCEDURE — 74011250636 HC RX REV CODE- 250/636: Performed by: NURSE PRACTITIONER

## 2017-07-29 PROCEDURE — 74011250636 HC RX REV CODE- 250/636: Performed by: PHYSICIAN ASSISTANT

## 2017-07-29 PROCEDURE — 85025 COMPLETE CBC W/AUTO DIFF WBC: CPT | Performed by: SURGERY

## 2017-07-29 PROCEDURE — 74011250636 HC RX REV CODE- 250/636: Performed by: PHYSICAL MEDICINE & REHABILITATION

## 2017-07-29 PROCEDURE — 74011636637 HC RX REV CODE- 636/637: Performed by: SURGERY

## 2017-07-29 PROCEDURE — 36592 COLLECT BLOOD FROM PICC: CPT

## 2017-07-29 PROCEDURE — 74011250636 HC RX REV CODE- 250/636: Performed by: EMERGENCY MEDICINE

## 2017-07-29 PROCEDURE — 74011000258 HC RX REV CODE- 258: Performed by: PHYSICIAN ASSISTANT

## 2017-07-29 PROCEDURE — 74011000258 HC RX REV CODE- 258: Performed by: INTERNAL MEDICINE

## 2017-07-29 PROCEDURE — 65660000000 HC RM CCU STEPDOWN

## 2017-07-29 RX ORDER — NYSTATIN 100000 [USP'U]/ML
500000 SUSPENSION ORAL 4 TIMES DAILY
Status: DISCONTINUED | OUTPATIENT
Start: 2017-07-29 | End: 2017-08-14

## 2017-07-29 RX ADMIN — HUMAN INSULIN 2 UNITS: 100 INJECTION, SOLUTION SUBCUTANEOUS at 06:31

## 2017-07-29 RX ADMIN — NYSTATIN 500000 UNITS: 100000 SUSPENSION ORAL at 21:56

## 2017-07-29 RX ADMIN — Medication 10 ML: at 13:00

## 2017-07-29 RX ADMIN — Medication: at 03:16

## 2017-07-29 RX ADMIN — SODIUM CHLORIDE, SODIUM LACTATE, POTASSIUM CHLORIDE, AND CALCIUM CHLORIDE 25 ML/HR: 600; 310; 30; 20 INJECTION, SOLUTION INTRAVENOUS at 03:20

## 2017-07-29 RX ADMIN — Medication: at 12:28

## 2017-07-29 RX ADMIN — OCTREOTIDE ACETATE 50 MCG: 50 INJECTION, SOLUTION INTRAVENOUS; SUBCUTANEOUS at 15:48

## 2017-07-29 RX ADMIN — Medication: at 18:40

## 2017-07-29 RX ADMIN — HYDROMORPHONE HYDROCHLORIDE 1 MG: 1 INJECTION, SOLUTION INTRAMUSCULAR; INTRAVENOUS; SUBCUTANEOUS at 15:47

## 2017-07-29 RX ADMIN — HUMAN INSULIN 2 UNITS: 100 INJECTION, SOLUTION SUBCUTANEOUS at 23:46

## 2017-07-29 RX ADMIN — Medication: at 20:31

## 2017-07-29 RX ADMIN — ONDANSETRON 8 MG: 2 INJECTION INTRAMUSCULAR; INTRAVENOUS at 09:29

## 2017-07-29 RX ADMIN — Medication 0.76 MG: at 13:59

## 2017-07-29 RX ADMIN — POTASSIUM CHLORIDE: 2 INJECTION, SOLUTION, CONCENTRATE INTRAVENOUS at 18:29

## 2017-07-29 RX ADMIN — HYDROMORPHONE HYDROCHLORIDE 1 MG: 1 INJECTION, SOLUTION INTRAMUSCULAR; INTRAVENOUS; SUBCUTANEOUS at 09:28

## 2017-07-29 RX ADMIN — Medication 10 ML: at 18:19

## 2017-07-29 RX ADMIN — SODIUM CHLORIDE 5 MG: 9 INJECTION INTRAMUSCULAR; INTRAVENOUS; SUBCUTANEOUS at 05:41

## 2017-07-29 RX ADMIN — VANCOMYCIN HYDROCHLORIDE 1500 MG: 10 INJECTION, POWDER, LYOPHILIZED, FOR SOLUTION INTRAVENOUS at 12:31

## 2017-07-29 RX ADMIN — HYDROMORPHONE HYDROCHLORIDE 1 MG: 1 INJECTION, SOLUTION INTRAMUSCULAR; INTRAVENOUS; SUBCUTANEOUS at 20:35

## 2017-07-29 RX ADMIN — ONDANSETRON 8 MG: 2 INJECTION INTRAMUSCULAR; INTRAVENOUS at 15:48

## 2017-07-29 RX ADMIN — CLOPIDOGREL BISULFATE 75 MG: 75 TABLET ORAL at 09:31

## 2017-07-29 RX ADMIN — SODIUM CHLORIDE 40 MG: 9 INJECTION INTRAMUSCULAR; INTRAVENOUS; SUBCUTANEOUS at 09:26

## 2017-07-29 RX ADMIN — Medication 10 ML: at 21:55

## 2017-07-29 RX ADMIN — Medication 10 ML: at 05:52

## 2017-07-29 RX ADMIN — Medication 10 ML: at 21:56

## 2017-07-29 RX ADMIN — NYSTATIN 500000 UNITS: 100000 SUSPENSION ORAL at 18:21

## 2017-07-29 RX ADMIN — ONDANSETRON 8 MG: 2 INJECTION INTRAMUSCULAR; INTRAVENOUS at 21:56

## 2017-07-29 RX ADMIN — Medication 0.76 MG: at 21:56

## 2017-07-29 RX ADMIN — PIPERACILLIN SODIUM,TAZOBACTAM SODIUM 3.38 G: 3; .375 INJECTION, POWDER, FOR SOLUTION INTRAVENOUS at 05:47

## 2017-07-29 RX ADMIN — HYDROMORPHONE HYDROCHLORIDE 1 MG: 1 INJECTION, SOLUTION INTRAMUSCULAR; INTRAVENOUS; SUBCUTANEOUS at 18:30

## 2017-07-29 RX ADMIN — PIPERACILLIN SODIUM,TAZOBACTAM SODIUM 3.38 G: 3; .375 INJECTION, POWDER, FOR SOLUTION INTRAVENOUS at 21:56

## 2017-07-29 RX ADMIN — Medication 0.76 MG: at 05:46

## 2017-07-29 RX ADMIN — PIPERACILLIN SODIUM,TAZOBACTAM SODIUM 3.38 G: 3; .375 INJECTION, POWDER, FOR SOLUTION INTRAVENOUS at 13:50

## 2017-07-29 RX ADMIN — SODIUM CHLORIDE 100 MG: 900 INJECTION, SOLUTION INTRAVENOUS at 18:19

## 2017-07-29 RX ADMIN — ASPIRIN 81 MG CHEWABLE TABLET 81 MG: 81 TABLET CHEWABLE at 09:31

## 2017-07-29 RX ADMIN — OCTREOTIDE ACETATE 50 MCG: 50 INJECTION, SOLUTION INTRAVENOUS; SUBCUTANEOUS at 21:56

## 2017-07-29 RX ADMIN — HYDROMORPHONE HYDROCHLORIDE 1 MG: 1 INJECTION, SOLUTION INTRAMUSCULAR; INTRAVENOUS; SUBCUTANEOUS at 12:56

## 2017-07-29 RX ADMIN — OCTREOTIDE ACETATE 50 MCG: 50 INJECTION, SOLUTION INTRAVENOUS; SUBCUTANEOUS at 09:28

## 2017-07-29 RX ADMIN — NYSTATIN 500000 UNITS: 100000 SUSPENSION ORAL at 15:47

## 2017-07-29 NOTE — PROGRESS NOTES
General Surgery Daily Progress Note    Admit Date: 2017  Post-Operative Day: 16 Days Post-Op from Procedure(s) with comments:  SPECIAL PROCEDURE OUTSIDE OF OR - central line placement     Subjective:     Last 24 hrs: Patient up in chair visiting with her mother. Reports sharp, stabbing pains beneath wound vac overnight. No NV. Enjoying sips for pleasure. Feels like right drain is pulling now. Reports vomiting with appliance bag changes because of the smell. No fevers or chills. No CP or SOB. Reports thick, curd-like material in her mouth making her gag. Anemia improved. Objective:     Blood pressure 122/68, pulse 82, temperature 98.7 °F (37.1 °C), resp. rate 18, height 5' 4\" (1.626 m), weight 335 lb 1.6 oz (152 kg), SpO2 100 %, not currently breastfeeding. Temp (24hrs), Av.6 °F (37 °C), Min:98.3 °F (36.8 °C), Max:99 °F (37.2 °C)      _____________________  Physical Exam:     Alert and Oriented, cooperative, in no acute distress, tearful today. Cardiovascular: RRR  Lungs:CTAB   Abdomen: soft, round with central wound vac in place. Drained 800ml from wound vac yesterday. Tenderness surrounding bilat (R>L) Malecot Drains with 700ml total output yesterday   Mouth: tongue with thick, white curd-like coating, piercing    Assessment:   Active Problems:    Abdominal pain (2017)      Small bowel ischemia ([address] (2017)      Overview: CT abd/pelvis 17      1. The stomach and proximal small bowel loops are distended. There is a       loop of      small bowel in the midabdomen which is mildly dilated and does not       demonstrate      enhancement in the wall and there is suspicion of pneumatosis and this       leads to      a loop of small bowel which demonstrates thickening of the wall and       findings are      suspicious for ischemia. 2. There is extensive mesenteric edema and a small amount of ascites in       the      pelvis.       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 There is nonocclusive thrombus in the SMA. 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 Continue wound care & dressing changes. Antibiotics  TPN. Pain management & antiemetics as needed  OOB to chair often as tolerated. Further treatment per Dr. Kulwinder Dey. Data Review:    Recent Labs      07/29/17   0507  07/28/17   0450  07/28/17   0332  07/27/17   0324   WBC  10.8   --   11.3*  8.7   HGB  7.5*  6.9*  4.6*  6.4*   HCT  23.5*  22.3*  15.1*  20.3*   PLT  323   --   407*  327     Recent Labs      07/29/17   0507  07/28/17   0332  07/27/17   0324   NA  138  139  140   K  4.0  3.8  3.9   CL  108  107  109*   CO2  24  25  24   GLU  121*  114*  130*   BUN  22*  21*  22*   CREA  0.91  0.87  0.89   CA  8.2*  8.3*  8.1*   MG  2.0  2.0  1.8   PHOS  3.8  4.1  4.1     No results for input(s): AML, LPSE in the last 72 hours.         ______________________  Medications:    Current Facility-Administered Medications   Medication Dose Route Frequency    TPN ADULT - CENTRAL   IntraVENous TITRATE    0.9% sodium chloride infusion 250 mL  250 mL IntraVENous PRN    vancomycin (VANCOCIN) 1500 mg in  ml infusion  1,500 mg IntraVENous Q24H    acetaminophen (TYLENOL) tablet 650 mg  650 mg Oral Q4H PRN    LORazepam (ATIVAN) injection 0.76 mg  0.76 mg IntraVENous Q8H    albuterol (PROVENTIL VENTOLIN) nebulizer solution 2.5 mg  2.5 mg Inhalation Q4H PRN    octreotide (SANDOSTATIN) injection 50 mcg  50 mcg IntraVENous TID    clopidogrel (PLAVIX) tablet 75 mg  75 mg Oral DAILY    sodium chloride (NS) flush 10 mL  10 mL InterCATHeter Q24H    sodium chloride (NS) flush 10 mL  10 mL InterCATHeter PRN    sodium chloride (NS) flush 10-40 mL  10-40 mL InterCATHeter Q8H    alteplase (CATHFLO) 1 mg in sterile water (preservative free) 1 mL injection  1 mg InterCATHeter PRN    bacitracin 500 unit/gram packet 1 Packet  1 Packet Topical PRN    0.9% sodium chloride infusion 250 mL  250 mL IntraVENous PRN    lactated Ringers infusion  25 mL/hr IntraVENous CONTINUOUS    fat emulsion 20% (LIPOSYN, INTRALIPID) infusion 500 mL  500 mL IntraVENous Q MON, WED & FRI    HYDROmorphone (PF) (DILAUDID) injection 1 mg  1 mg IntraVENous Q1H PRN    aspirin chewable tablet 81 mg  81 mg Oral DAILY    insulin regular (NOVOLIN R, HUMULIN R) injection   SubCUTAneous Q6H    morphine (PF)  mg/30 ml   IntraVENous CONTINUOUS    glucose chewable tablet 16 g  4 Tab Oral PRN    glucagon (GLUCAGEN) injection 1 mg  1 mg IntraMUSCular PRN    dextrose 10 % infusion 125-250 mL  125-250 mL IntraVENous PRN    pantoprazole (PROTONIX) 40 mg in sodium chloride 0.9 % 10 mL injection  40 mg IntraVENous DAILY    prochlorperazine (COMPAZINE) with saline injection 5 mg  5 mg IntraVENous Q6H PRN    anidulafungin (ERAXIS) 100 mg in 0.9% sodium chloride 130 mL IVPB  100 mg IntraVENous Q24H    ondansetron (ZOFRAN) injection 8 mg  8 mg IntraVENous Q6H PRN    sodium chloride (NS) flush 5-10 mL  5-10 mL IntraVENous Q8H    sodium chloride (NS) flush 5-10 mL  5-10 mL IntraVENous PRN    naloxone (NARCAN) injection 0.4 mg  0.4 mg IntraVENous PRN    diphenhydrAMINE (BENADRYL) injection 12.5 mg  12.5 mg IntraVENous Q4H PRN    piperacillin-tazobactam (ZOSYN) 3.375 g in 0.9% sodium chloride (MBP/ADV) 100 mL  3.375 g IntraVENous Q8H    Vancomycin ---Pharmacy to Dose   Other Rx Dosing/Monitoring       Poppy Hilton PA-C  7/29/2017

## 2017-07-29 NOTE — PROGRESS NOTES
Bedside and Verbal shift change report given to Eunice Weldon (oncoming nurse) by Jimmy Henao (offgoing nurse). Report included the following information SBAR, OR Summary, Procedure Summary, Intake/Output, MAR, Accordion, Recent Results, Med Rec Status and Cardiac Rhythm Normal Sinus.

## 2017-07-29 NOTE — PROGRESS NOTES
Pharmacist Note - Vancomycin Dosing  Therapy day 35  Indication: Enterocutaneous fistula with frozen abdomen/ischemic bowel and abdominal wound infection  Current regimen: 1500 mg IV every 24 hours    A Trough Level resulted at 12.3 mcg/mL which was obtained 21.5 hrs post-dose. The extrapolated \"true\" trough is approximately 11.2 mcg/mL based on the patient's known kinetics. Goal trough: 10 - 15 mcg/mL       Plan: Continue current regimen. Pharmacy will continue to monitor this patient daily for changes in clinical status and renal function.       Laura Alvarez, PharmD, [address], BCPS

## 2017-07-29 NOTE — ROUTINE PROCESS
Bedside shift change report given to Mattie Keith RN and Yash Duran RN (oncoming nurse) by Kymberly Gillis RN (offgoing nurse). Report included the following information SBAR, Procedure Summary, Intake/Output, MAR and Recent Results.

## 2017-07-30 LAB
ANION GAP BLD CALC-SCNC: 7 MMOL/L (ref 5–15)
BASOPHILS # BLD AUTO: 0 K/UL (ref 0–0.1)
BASOPHILS # BLD: 0 % (ref 0–1)
BUN SERPL-MCNC: 21 MG/DL (ref 6–20)
BUN/CREAT SERPL: 23 (ref 12–20)
CALCIUM SERPL-MCNC: 8.7 MG/DL (ref 8.5–10.1)
CHLORIDE SERPL-SCNC: 110 MMOL/L (ref 97–108)
CO2 SERPL-SCNC: 24 MMOL/L (ref 21–32)
CREAT SERPL-MCNC: 0.92 MG/DL (ref 0.55–1.02)
DIFFERENTIAL METHOD BLD: ABNORMAL
EOSINOPHIL # BLD: 0.3 K/UL (ref 0–0.4)
EOSINOPHIL NFR BLD: 3 % (ref 0–7)
ERYTHROCYTE [DISTWIDTH] IN BLOOD BY AUTOMATED COUNT: 16.9 % (ref 11.5–14.5)
GLUCOSE BLD STRIP.AUTO-MCNC: 101 MG/DL (ref 65–100)
GLUCOSE BLD STRIP.AUTO-MCNC: 124 MG/DL (ref 65–100)
GLUCOSE BLD STRIP.AUTO-MCNC: 141 MG/DL (ref 65–100)
GLUCOSE BLD STRIP.AUTO-MCNC: 141 MG/DL (ref 65–100)
GLUCOSE BLD STRIP.AUTO-MCNC: 85 MG/DL (ref 65–100)
GLUCOSE SERPL-MCNC: 90 MG/DL (ref 65–100)
HCT VFR BLD AUTO: 26.7 % (ref 35–47)
HGB BLD-MCNC: 8.3 G/DL (ref 11.5–16)
LYMPHOCYTES # BLD AUTO: 36 % (ref 12–49)
LYMPHOCYTES # BLD: 3.5 K/UL (ref 0.8–3.5)
MAGNESIUM SERPL-MCNC: 1.8 MG/DL (ref 1.6–2.4)
MCH RBC QN AUTO: 28.3 PG (ref 26–34)
MCHC RBC AUTO-ENTMCNC: 31.1 G/DL (ref 30–36.5)
MCV RBC AUTO: 91.1 FL (ref 80–99)
MONOCYTES # BLD: 0.6 K/UL (ref 0–1)
MONOCYTES NFR BLD AUTO: 6 % (ref 5–13)
NEUTS BAND NFR BLD MANUAL: 2 % (ref 0–6)
NEUTS SEG # BLD: 5.3 K/UL (ref 1.8–8)
NEUTS SEG NFR BLD AUTO: 53 % (ref 32–75)
PHOSPHATE SERPL-MCNC: 4.3 MG/DL (ref 2.6–4.7)
PLATELET # BLD AUTO: 353 K/UL (ref 150–400)
PLATELET COMMENTS,PCOM: ABNORMAL
POTASSIUM SERPL-SCNC: 4.1 MMOL/L (ref 3.5–5.1)
RBC # BLD AUTO: 2.93 M/UL (ref 3.8–5.2)
RBC MORPH BLD: ABNORMAL
SERVICE CMNT-IMP: ABNORMAL
SERVICE CMNT-IMP: NORMAL
SODIUM SERPL-SCNC: 141 MMOL/L (ref 136–145)
WBC # BLD AUTO: 9.7 K/UL (ref 3.6–11)
WBC MORPH BLD: ABNORMAL

## 2017-07-30 PROCEDURE — 74011000250 HC RX REV CODE- 250: Performed by: SURGERY

## 2017-07-30 PROCEDURE — 80048 BASIC METABOLIC PNL TOTAL CA: CPT | Performed by: SURGERY

## 2017-07-30 PROCEDURE — 74011000258 HC RX REV CODE- 258: Performed by: INTERNAL MEDICINE

## 2017-07-30 PROCEDURE — 85025 COMPLETE CBC W/AUTO DIFF WBC: CPT | Performed by: SURGERY

## 2017-07-30 PROCEDURE — 3331090002 HH PPS REVENUE DEBIT

## 2017-07-30 PROCEDURE — 74011250636 HC RX REV CODE- 250/636: Performed by: SURGERY

## 2017-07-30 PROCEDURE — 74011250636 HC RX REV CODE- 250/636: Performed by: NURSE PRACTITIONER

## 2017-07-30 PROCEDURE — 74011000258 HC RX REV CODE- 258: Performed by: SURGERY

## 2017-07-30 PROCEDURE — 65660000000 HC RM CCU STEPDOWN

## 2017-07-30 PROCEDURE — 74011250637 HC RX REV CODE- 250/637: Performed by: SURGERY

## 2017-07-30 PROCEDURE — 74011250636 HC RX REV CODE- 250/636: Performed by: PHYSICAL MEDICINE & REHABILITATION

## 2017-07-30 PROCEDURE — 83735 ASSAY OF MAGNESIUM: CPT | Performed by: SURGERY

## 2017-07-30 PROCEDURE — 74011250636 HC RX REV CODE- 250/636: Performed by: INTERNAL MEDICINE

## 2017-07-30 PROCEDURE — 36415 COLL VENOUS BLD VENIPUNCTURE: CPT | Performed by: SURGERY

## 2017-07-30 PROCEDURE — 74011250637 HC RX REV CODE- 250/637: Performed by: PHYSICIAN ASSISTANT

## 2017-07-30 PROCEDURE — 74011636637 HC RX REV CODE- 636/637: Performed by: SURGERY

## 2017-07-30 PROCEDURE — 84100 ASSAY OF PHOSPHORUS: CPT | Performed by: SURGERY

## 2017-07-30 PROCEDURE — 74011250636 HC RX REV CODE- 250/636: Performed by: EMERGENCY MEDICINE

## 2017-07-30 PROCEDURE — 3331090001 HH PPS REVENUE CREDIT

## 2017-07-30 PROCEDURE — C9113 INJ PANTOPRAZOLE SODIUM, VIA: HCPCS | Performed by: SURGERY

## 2017-07-30 RX ADMIN — Medication 10 ML: at 14:18

## 2017-07-30 RX ADMIN — Medication: at 12:21

## 2017-07-30 RX ADMIN — HYDROMORPHONE HYDROCHLORIDE 1 MG: 1 INJECTION, SOLUTION INTRAMUSCULAR; INTRAVENOUS; SUBCUTANEOUS at 20:40

## 2017-07-30 RX ADMIN — NYSTATIN 500000 UNITS: 100000 SUSPENSION ORAL at 12:25

## 2017-07-30 RX ADMIN — ONDANSETRON 8 MG: 2 INJECTION INTRAMUSCULAR; INTRAVENOUS at 09:24

## 2017-07-30 RX ADMIN — HYDROMORPHONE HYDROCHLORIDE 1 MG: 1 INJECTION, SOLUTION INTRAMUSCULAR; INTRAVENOUS; SUBCUTANEOUS at 14:16

## 2017-07-30 RX ADMIN — ASPIRIN 81 MG CHEWABLE TABLET 81 MG: 81 TABLET CHEWABLE at 09:24

## 2017-07-30 RX ADMIN — Medication: at 06:05

## 2017-07-30 RX ADMIN — Medication 0.76 MG: at 22:16

## 2017-07-30 RX ADMIN — SODIUM CHLORIDE 100 MG: 900 INJECTION, SOLUTION INTRAVENOUS at 17:40

## 2017-07-30 RX ADMIN — Medication 10 ML: at 17:39

## 2017-07-30 RX ADMIN — VANCOMYCIN HYDROCHLORIDE 1500 MG: 10 INJECTION, POWDER, LYOPHILIZED, FOR SOLUTION INTRAVENOUS at 12:31

## 2017-07-30 RX ADMIN — POTASSIUM CHLORIDE: 2 INJECTION, SOLUTION, CONCENTRATE INTRAVENOUS at 18:02

## 2017-07-30 RX ADMIN — ONDANSETRON 8 MG: 2 INJECTION INTRAMUSCULAR; INTRAVENOUS at 17:36

## 2017-07-30 RX ADMIN — PIPERACILLIN SODIUM,TAZOBACTAM SODIUM 3.38 G: 3; .375 INJECTION, POWDER, FOR SOLUTION INTRAVENOUS at 06:05

## 2017-07-30 RX ADMIN — HYDROMORPHONE HYDROCHLORIDE 1 MG: 1 INJECTION, SOLUTION INTRAMUSCULAR; INTRAVENOUS; SUBCUTANEOUS at 06:22

## 2017-07-30 RX ADMIN — HYDROMORPHONE HYDROCHLORIDE 1 MG: 1 INJECTION, SOLUTION INTRAMUSCULAR; INTRAVENOUS; SUBCUTANEOUS at 12:19

## 2017-07-30 RX ADMIN — OCTREOTIDE ACETATE 50 MCG: 50 INJECTION, SOLUTION INTRAVENOUS; SUBCUTANEOUS at 22:16

## 2017-07-30 RX ADMIN — PIPERACILLIN SODIUM,TAZOBACTAM SODIUM 3.38 G: 3; .375 INJECTION, POWDER, FOR SOLUTION INTRAVENOUS at 22:15

## 2017-07-30 RX ADMIN — Medication 10 ML: at 06:06

## 2017-07-30 RX ADMIN — SODIUM CHLORIDE 40 MG: 9 INJECTION INTRAMUSCULAR; INTRAVENOUS; SUBCUTANEOUS at 09:24

## 2017-07-30 RX ADMIN — CLOPIDOGREL BISULFATE 75 MG: 75 TABLET ORAL at 09:24

## 2017-07-30 RX ADMIN — NYSTATIN 500000 UNITS: 100000 SUSPENSION ORAL at 18:02

## 2017-07-30 RX ADMIN — Medication 10 ML: at 12:20

## 2017-07-30 RX ADMIN — NYSTATIN 500000 UNITS: 100000 SUSPENSION ORAL at 09:24

## 2017-07-30 RX ADMIN — Medication 10 ML: at 22:19

## 2017-07-30 RX ADMIN — NYSTATIN 500000 UNITS: 100000 SUSPENSION ORAL at 22:28

## 2017-07-30 RX ADMIN — HYDROMORPHONE HYDROCHLORIDE 1 MG: 1 INJECTION, SOLUTION INTRAMUSCULAR; INTRAVENOUS; SUBCUTANEOUS at 17:33

## 2017-07-30 RX ADMIN — HYDROMORPHONE HYDROCHLORIDE 1 MG: 1 INJECTION, SOLUTION INTRAMUSCULAR; INTRAVENOUS; SUBCUTANEOUS at 02:44

## 2017-07-30 RX ADMIN — Medication 0.76 MG: at 14:14

## 2017-07-30 RX ADMIN — Medication: at 18:04

## 2017-07-30 RX ADMIN — Medication 0.76 MG: at 06:07

## 2017-07-30 RX ADMIN — SODIUM CHLORIDE, SODIUM LACTATE, POTASSIUM CHLORIDE, AND CALCIUM CHLORIDE 25 ML/HR: 600; 310; 30; 20 INJECTION, SOLUTION INTRAVENOUS at 09:00

## 2017-07-30 RX ADMIN — Medication: at 20:01

## 2017-07-30 RX ADMIN — OCTREOTIDE ACETATE 50 MCG: 50 INJECTION, SOLUTION INTRAVENOUS; SUBCUTANEOUS at 09:23

## 2017-07-30 RX ADMIN — OCTREOTIDE ACETATE 50 MCG: 50 INJECTION, SOLUTION INTRAVENOUS; SUBCUTANEOUS at 17:37

## 2017-07-30 RX ADMIN — PIPERACILLIN SODIUM,TAZOBACTAM SODIUM 3.38 G: 3; .375 INJECTION, POWDER, FOR SOLUTION INTRAVENOUS at 14:16

## 2017-07-30 RX ADMIN — HYDROMORPHONE HYDROCHLORIDE 1 MG: 1 INJECTION, SOLUTION INTRAMUSCULAR; INTRAVENOUS; SUBCUTANEOUS at 10:57

## 2017-07-30 RX ADMIN — HYDROMORPHONE HYDROCHLORIDE 1 MG: 1 INJECTION, SOLUTION INTRAMUSCULAR; INTRAVENOUS; SUBCUTANEOUS at 09:23

## 2017-07-30 NOTE — PROGRESS NOTES
Bedside and Verbal shift change report given to Joyce Butler RN (oncoming nurse) by Brea Allen RN (offgoing nurse). Report included the following information SBAR, Kardex, Procedure Summary, Intake/Output, Recent Results and Cardiac Rhythm NSR.

## 2017-07-30 NOTE — PROGRESS NOTES
Progress Note    Patient: Jane Chung MRN: [id number]  SSN: [id number]    YOB: 1975  Age: 46 y.o. Sex: female      Admit Date: 2017    17 Days Post-Op    Procedure:  Procedure(s):  SPECIAL PROCEDURE OUTSIDE OF OR    Subjective:     No acute surgical issues. Pt continues to have leakage with wound vac. Tolerating sips. Pain is under control. Objective:     Visit Vitals    /61 (BP 1 Location: Right arm, BP Patient Position: At rest)    Pulse 96    Temp 98.2 °F (36.8 °C)    Resp 18    Ht 5' 4\" (1.626 m)    Wt 336 lb 13.8 oz (152.8 kg)    SpO2 97%    Breastfeeding No    BMI 57.82 kg/m2       Temp (24hrs), Av.5 °F (36.9 °C), Min:98.2 °F (36.8 °C), Max:98.9 °F (37.2 °C)        Physical Exam:    Gen:  NAD  Pulm:  Unlabored  Abd:  S/ND/appropriate TTP  Wound vac:   With semibilious drainage    Recent Results (from the past 24 hour(s))   GLUCOSE, POC    Collection Time: 17  5:45 PM   Result Value Ref Range    Glucose (POC) 96 65 - 100 mg/dL    Performed by MANUEL URBAN(CON)    GLUCOSE, POC    Collection Time: 17 11:43 PM   Result Value Ref Range    Glucose (POC) 141 (H) 65 - 100 mg/dL    Performed by [facility] OFELIA    METABOLIC PANEL, BASIC    Collection Time: 17  2:52 AM   Result Value Ref Range    Sodium 141 136 - 145 mmol/L    Potassium 4.1 3.5 - 5.1 mmol/L    Chloride 110 (H) 97 - 108 mmol/L    CO2 24 21 - 32 mmol/L    Anion gap 7 5 - 15 mmol/L    Glucose 90 65 - 100 mg/dL    BUN 21 (H) 6 - 20 MG/DL    Creatinine 0.92 0.55 - 1.02 MG/DL    BUN/Creatinine ratio 23 (H) 12 - 20      GFR est AA >60 >60 ml/min/1.73m2    GFR est non-AA >60 >60 ml/min/1.73m2    Calcium 8.7 8.5 - 10.1 MG/DL   MAGNESIUM    Collection Time: 17  2:52 AM   Result Value Ref Range    Magnesium 1.8 1.6 - 2.4 mg/dL   PHOSPHORUS    Collection Time: 17  2:52 AM   Result Value Ref Range    Phosphorus 4.3 2.6 - 4.7 MG/DL   CBC WITH AUTOMATED DIFF    Collection Time: 07/30/17  2:52 AM   Result Value Ref Range    WBC 9.7 3.6 - 11.0 K/uL    RBC 2.93 (L) 3.80 - 5.20 M/uL    HGB 8.3 (L) 11.5 - 16.0 g/dL    HCT 26.7 (L) 35.0 - 47.0 %    MCV 91.1 80.0 - 99.0 FL    MCH 28.3 26.0 - 34.0 PG    MCHC 31.1 30.0 - 36.5 g/dL    RDW 16.9 (H) 11.5 - 14.5 %    PLATELET 867 753 - 169 K/uL    NEUTROPHILS 53 32 - 75 %    BAND NEUTROPHILS 2 0 - 6 %    LYMPHOCYTES 36 12 - 49 %    MONOCYTES 6 5 - 13 %    EOSINOPHILS 3 0 - 7 %    BASOPHILS 0 0 - 1 %    ABS. NEUTROPHILS 5.3 1.8 - 8.0 K/UL    ABS. LYMPHOCYTES 3.5 0.8 - 3.5 K/UL    ABS. MONOCYTES 0.6 0.0 - 1.0 K/UL    ABS. EOSINOPHILS 0.3 0.0 - 0.4 K/UL    ABS. BASOPHILS 0.0 0.0 - 0.1 K/UL    DF MANUAL      PLATELET COMMENTS LARGE PLATELETS      RBC COMMENTS ANISOCYTOSIS  1+        RBC COMMENTS POLYCHROMASIA  1+        RBC COMMENTS MICROCYTOSIS  1+        WBC COMMENTS REACTIVE LYMPHS     GLUCOSE, POC    Collection Time: 07/30/17  6:10 AM   Result Value Ref Range    Glucose (POC) 124 (H) 65 - 100 mg/dL    Performed by Shahrzad Fishman    GLUCOSE, POC    Collection Time: 07/30/17 11:33 AM   Result Value Ref Range    Glucose (POC) 101 (H) 65 - 100 mg/dL    Performed by REYES ALEXIS            Assessment:     Hospital Problems  Date Reviewed: 6/28/2017          Codes Class Noted POA    Enterocutaneous fistula ICD-10-CM: K63.2  ICD-9-CM: 569.81  6/30/2017 No        Small bowel ischemia ([facility] [address] [facility] ICD-10-CM: K55.9  ICD-9-CM: 557.9  6/28/2017 Clinically Undetermined    Overview Signed 7/3/2017  5:08 PM by Sid Pisano MD     CT abd/pelvis 6/28/17  1. The stomach and proximal small bowel loops are distended. There is a loop of  small bowel in the midabdomen which is mildly dilated and does not demonstrate  enhancement in the wall and there is suspicion of pneumatosis and this leads to  a loop of small bowel which demonstrates thickening of the wall and findings are  suspicious for ischemia.   2. There is extensive mesenteric edema and a small amount of ascites in the  pelvis. Superior mesenteric artery thrombosis [facility]) ICD-10-CM: K55.069  ICD-9-CM: 557.0  6/28/2017 Clinically Undetermined    Overview Signed 7/3/2017  5:09 PM by Sid Pisano MD     CT abd/pelvis 6/28/17:  3. There is nonocclusive thrombus in the SMA.                Abdominal pain ICD-10-CM: R10.9  ICD-9-CM: 789.00  6/25/2017 Yes              Plan/Recommendations/Medical Decision Making:     - Abdominal wound:  Set fred drain to wall suction  - Continue TPN  - Continue antibiotic  - Pain control    Signed By: Anu Moise MD     July 30, 2017

## 2017-07-30 NOTE — PROGRESS NOTES
Problem: Falls - Risk of  Goal: *Absence of falls  Outcome: Progressing Towards Goal  Patient is without falls at this time. Bed is locked and in lowest position. Call bell and bedside table are within reach. Goal: *Knowledge of fall prevention  Outcome: Progressing Towards Goal  Patient is encouraged to call when assistance is needed. Problem: Surgical Pathway Post-Op Day 2 through Discharge  Goal: *Optimal pain control at patients stated goal  Outcome: Progressing Towards Goal  Pain is being controlled with the use of PRN medications and a PCA pump at this time. Goal: *Tolerating diet  Outcome: Progressing Towards Goal  Patient is NPO at this time.   Goal: *Demonstrates progressive activity  Outcome: Progressing Towards Goal  Patient is up with assistance

## 2017-07-30 NOTE — PROGRESS NOTES
8:35 PM    Bedside and Verbal shift change report given to Shruthi Logan RN (oncoming nurse) by Diamond Cool RN (offgoing nurse). Report included the following information SBAR, Intake/Output, MAR, Recent Results, Med Rec Status and Cardiac Rhythm NSR.

## 2017-07-31 LAB
ANION GAP BLD CALC-SCNC: 7 MMOL/L (ref 5–15)
BASOPHILS # BLD AUTO: 0 K/UL (ref 0–0.1)
BASOPHILS # BLD: 0 % (ref 0–1)
BUN SERPL-MCNC: 22 MG/DL (ref 6–20)
BUN/CREAT SERPL: 27 (ref 12–20)
CALCIUM SERPL-MCNC: 8.6 MG/DL (ref 8.5–10.1)
CHLORIDE SERPL-SCNC: 109 MMOL/L (ref 97–108)
CO2 SERPL-SCNC: 24 MMOL/L (ref 21–32)
CREAT SERPL-MCNC: 0.83 MG/DL (ref 0.55–1.02)
EOSINOPHIL # BLD: 0.4 K/UL (ref 0–0.4)
EOSINOPHIL NFR BLD: 5 % (ref 0–7)
ERYTHROCYTE [DISTWIDTH] IN BLOOD BY AUTOMATED COUNT: 17.5 % (ref 11.5–14.5)
GLUCOSE BLD STRIP.AUTO-MCNC: 100 MG/DL (ref 65–100)
GLUCOSE BLD STRIP.AUTO-MCNC: 135 MG/DL (ref 65–100)
GLUCOSE BLD STRIP.AUTO-MCNC: 139 MG/DL (ref 65–100)
GLUCOSE BLD STRIP.AUTO-MCNC: 94 MG/DL (ref 65–100)
GLUCOSE SERPL-MCNC: 99 MG/DL (ref 65–100)
HCT VFR BLD AUTO: 27.4 % (ref 35–47)
HGB BLD-MCNC: 8.3 G/DL (ref 11.5–16)
LYMPHOCYTES # BLD AUTO: 38 % (ref 12–49)
LYMPHOCYTES # BLD: 3.5 K/UL (ref 0.8–3.5)
MAGNESIUM SERPL-MCNC: 2 MG/DL (ref 1.6–2.4)
MCH RBC QN AUTO: 27.9 PG (ref 26–34)
MCHC RBC AUTO-ENTMCNC: 30.3 G/DL (ref 30–36.5)
MCV RBC AUTO: 91.9 FL (ref 80–99)
MONOCYTES # BLD: 0.9 K/UL (ref 0–1)
MONOCYTES NFR BLD AUTO: 10 % (ref 5–13)
NEUTS SEG # BLD: 4.4 K/UL (ref 1.8–8)
NEUTS SEG NFR BLD AUTO: 47 % (ref 32–75)
PHOSPHATE SERPL-MCNC: 4 MG/DL (ref 2.6–4.7)
PLATELET # BLD AUTO: 377 K/UL (ref 150–400)
POTASSIUM SERPL-SCNC: 4.1 MMOL/L (ref 3.5–5.1)
RBC # BLD AUTO: 2.98 M/UL (ref 3.8–5.2)
SERVICE CMNT-IMP: ABNORMAL
SERVICE CMNT-IMP: ABNORMAL
SERVICE CMNT-IMP: NORMAL
SERVICE CMNT-IMP: NORMAL
SODIUM SERPL-SCNC: 140 MMOL/L (ref 136–145)
WBC # BLD AUTO: 9.3 K/UL (ref 3.6–11)

## 2017-07-31 PROCEDURE — 74011000250 HC RX REV CODE- 250: Performed by: SURGERY

## 2017-07-31 PROCEDURE — 3331090001 HH PPS REVENUE CREDIT

## 2017-07-31 PROCEDURE — 74011000258 HC RX REV CODE- 258: Performed by: INTERNAL MEDICINE

## 2017-07-31 PROCEDURE — 74011250636 HC RX REV CODE- 250/636: Performed by: SURGERY

## 2017-07-31 PROCEDURE — 74011000258 HC RX REV CODE- 258: Performed by: SURGERY

## 2017-07-31 PROCEDURE — 74011250637 HC RX REV CODE- 250/637: Performed by: SURGERY

## 2017-07-31 PROCEDURE — 74011000250 HC RX REV CODE- 250: Performed by: NURSE PRACTITIONER

## 2017-07-31 PROCEDURE — 74011250636 HC RX REV CODE- 250/636: Performed by: EMERGENCY MEDICINE

## 2017-07-31 PROCEDURE — 74011250637 HC RX REV CODE- 250/637: Performed by: PHYSICIAN ASSISTANT

## 2017-07-31 PROCEDURE — 3331090002 HH PPS REVENUE DEBIT

## 2017-07-31 PROCEDURE — 85025 COMPLETE CBC W/AUTO DIFF WBC: CPT | Performed by: SURGERY

## 2017-07-31 PROCEDURE — 74011250636 HC RX REV CODE- 250/636: Performed by: INTERNAL MEDICINE

## 2017-07-31 PROCEDURE — 74011250636 HC RX REV CODE- 250/636

## 2017-07-31 PROCEDURE — 65660000000 HC RM CCU STEPDOWN

## 2017-07-31 PROCEDURE — 74011250636 HC RX REV CODE- 250/636: Performed by: NURSE PRACTITIONER

## 2017-07-31 PROCEDURE — C9113 INJ PANTOPRAZOLE SODIUM, VIA: HCPCS | Performed by: SURGERY

## 2017-07-31 PROCEDURE — 36415 COLL VENOUS BLD VENIPUNCTURE: CPT | Performed by: SURGERY

## 2017-07-31 PROCEDURE — 97110 THERAPEUTIC EXERCISES: CPT

## 2017-07-31 PROCEDURE — 80048 BASIC METABOLIC PNL TOTAL CA: CPT | Performed by: SURGERY

## 2017-07-31 PROCEDURE — 74011250636 HC RX REV CODE- 250/636: Performed by: PHYSICAL MEDICINE & REHABILITATION

## 2017-07-31 PROCEDURE — 74011636637 HC RX REV CODE- 636/637: Performed by: SURGERY

## 2017-07-31 PROCEDURE — 83735 ASSAY OF MAGNESIUM: CPT | Performed by: SURGERY

## 2017-07-31 PROCEDURE — 84100 ASSAY OF PHOSPHORUS: CPT | Performed by: SURGERY

## 2017-07-31 PROCEDURE — 82962 GLUCOSE BLOOD TEST: CPT

## 2017-07-31 RX ORDER — ONDANSETRON 2 MG/ML
INJECTION INTRAMUSCULAR; INTRAVENOUS
Status: COMPLETED
Start: 2017-07-31 | End: 2017-07-31

## 2017-07-31 RX ADMIN — Medication 0.76 MG: at 06:24

## 2017-07-31 RX ADMIN — OCTREOTIDE ACETATE 50 MCG: 50 INJECTION, SOLUTION INTRAVENOUS; SUBCUTANEOUS at 17:18

## 2017-07-31 RX ADMIN — ONDANSETRON 8 MG: 2 INJECTION INTRAMUSCULAR; INTRAVENOUS at 09:37

## 2017-07-31 RX ADMIN — HYDROMORPHONE HYDROCHLORIDE 1 MG: 1 INJECTION, SOLUTION INTRAMUSCULAR; INTRAVENOUS; SUBCUTANEOUS at 15:36

## 2017-07-31 RX ADMIN — Medication: at 15:38

## 2017-07-31 RX ADMIN — SODIUM CHLORIDE 40 MG: 9 INJECTION INTRAMUSCULAR; INTRAVENOUS; SUBCUTANEOUS at 09:17

## 2017-07-31 RX ADMIN — PIPERACILLIN SODIUM,TAZOBACTAM SODIUM 3.38 G: 3; .375 INJECTION, POWDER, FOR SOLUTION INTRAVENOUS at 13:44

## 2017-07-31 RX ADMIN — CLOPIDOGREL BISULFATE 75 MG: 75 TABLET ORAL at 09:18

## 2017-07-31 RX ADMIN — NYSTATIN 500000 UNITS: 100000 SUSPENSION ORAL at 22:08

## 2017-07-31 RX ADMIN — Medication 0.76 MG: at 13:33

## 2017-07-31 RX ADMIN — PIPERACILLIN SODIUM,TAZOBACTAM SODIUM 3.38 G: 3; .375 INJECTION, POWDER, FOR SOLUTION INTRAVENOUS at 22:08

## 2017-07-31 RX ADMIN — Medication 10 ML: at 17:18

## 2017-07-31 RX ADMIN — Medication: at 22:04

## 2017-07-31 RX ADMIN — OCTREOTIDE ACETATE 50 MCG: 50 INJECTION, SOLUTION INTRAVENOUS; SUBCUTANEOUS at 22:28

## 2017-07-31 RX ADMIN — Medication 10 ML: at 13:32

## 2017-07-31 RX ADMIN — PIPERACILLIN SODIUM,TAZOBACTAM SODIUM 3.38 G: 3; .375 INJECTION, POWDER, FOR SOLUTION INTRAVENOUS at 06:24

## 2017-07-31 RX ADMIN — NYSTATIN 500000 UNITS: 100000 SUSPENSION ORAL at 18:32

## 2017-07-31 RX ADMIN — OCTREOTIDE ACETATE 50 MCG: 50 INJECTION, SOLUTION INTRAVENOUS; SUBCUTANEOUS at 10:22

## 2017-07-31 RX ADMIN — Medication: at 04:46

## 2017-07-31 RX ADMIN — Medication 10 ML: at 18:32

## 2017-07-31 RX ADMIN — Medication 0.76 MG: at 22:08

## 2017-07-31 RX ADMIN — Medication 10 ML: at 13:31

## 2017-07-31 RX ADMIN — SODIUM CHLORIDE, SODIUM LACTATE, POTASSIUM CHLORIDE, AND CALCIUM CHLORIDE 25 ML/HR: 600; 310; 30; 20 INJECTION, SOLUTION INTRAVENOUS at 10:07

## 2017-07-31 RX ADMIN — SODIUM CHLORIDE 100 MG: 900 INJECTION, SOLUTION INTRAVENOUS at 18:32

## 2017-07-31 RX ADMIN — NYSTATIN 500000 UNITS: 100000 SUSPENSION ORAL at 09:18

## 2017-07-31 RX ADMIN — NYSTATIN 500000 UNITS: 100000 SUSPENSION ORAL at 13:31

## 2017-07-31 RX ADMIN — HYDROMORPHONE HYDROCHLORIDE 1 MG: 1 INJECTION, SOLUTION INTRAMUSCULAR; INTRAVENOUS; SUBCUTANEOUS at 13:40

## 2017-07-31 RX ADMIN — I.V. FAT EMULSION 500 ML: 20 EMULSION INTRAVENOUS at 19:31

## 2017-07-31 RX ADMIN — HYDROMORPHONE HYDROCHLORIDE 1 MG: 1 INJECTION, SOLUTION INTRAMUSCULAR; INTRAVENOUS; SUBCUTANEOUS at 18:32

## 2017-07-31 RX ADMIN — ONDANSETRON 8 MG: 2 INJECTION INTRAMUSCULAR; INTRAVENOUS at 22:36

## 2017-07-31 RX ADMIN — HYDROMORPHONE HYDROCHLORIDE 1 MG: 1 INJECTION, SOLUTION INTRAMUSCULAR; INTRAVENOUS; SUBCUTANEOUS at 10:29

## 2017-07-31 RX ADMIN — ASPIRIN 81 MG CHEWABLE TABLET 81 MG: 81 TABLET CHEWABLE at 09:18

## 2017-07-31 RX ADMIN — VANCOMYCIN HYDROCHLORIDE 1500 MG: 10 INJECTION, POWDER, LYOPHILIZED, FOR SOLUTION INTRAVENOUS at 13:42

## 2017-07-31 RX ADMIN — POTASSIUM CHLORIDE: 2 INJECTION, SOLUTION, CONCENTRATE INTRAVENOUS at 19:14

## 2017-07-31 RX ADMIN — Medication: at 20:07

## 2017-07-31 RX ADMIN — HUMAN INSULIN 2 UNITS: 100 INJECTION, SOLUTION SUBCUTANEOUS at 00:42

## 2017-07-31 NOTE — PROGRESS NOTES
8:25 PM  Bedside and Verbal shift change report given to Amador RN (oncoming nurse) by Kris Goodwin RN (offgoing nurse). Report included the following information SBAR, Intake/Output, MAR, Recent Results, Med Rec Status and Cardiac Rhythm NSR.

## 2017-07-31 NOTE — PROGRESS NOTES
Attempted follow-up visit with Ms. Kwong. Pt appeared to be resting comfortably; did not disturb. Will attempt to follow-up as able; please page [phone] for  support as needed.     Bhumika Johnson, Palliative

## 2017-07-31 NOTE — PROGRESS NOTES
NUTRITION       Recommendations:  1. Continue TPN at current goal as sole source of nutrition     2. Continue daily weight (obtain standing weight as able secondary to 10.2 kg difference since last standing weight obtained on 7/19)    3. Continue to monitor fluid status and increase additional LR infusion prn    Brief follow up. Chart reviewed, discussed with team during interdisciplinary rounds. Pt remains on TPN as sole source of nutrition support. She is meeting 100% of estimated needs since volume increased 7/27/17. Wound vac changed today. Drain output over the past 3 days:  2053 mL- 7/31  1950 mL- 7/30  1500 mL- 7/29    TPN: 5%AA D20 @ 85 mL/hr x 1 hour    @ 173 ml/hr x 13 hours    @ 85 mL/hr x last hour   + 20% lipids, 500 mL 3x/week   + MVI, thiamine (100 mg), 14 units insulin/L  -- This is providing a daily average of 2558 kcal, 121 g protein in 2419 mL -- meeting 100% of estimated needs. LR: @ 25 mL/hr (600 mL/day)    Weight is up 10.3 kg x 12 days-- would obtain another standing scale weight as able for accuracy. Last 3 Recorded Weights in this Encounter    07/29/17 0315 07/30/17 0301 07/31/17 0533   Weight: 152 kg (335 lb 1.6 oz) 152.8 kg (336 lb 13.8 oz) 153 kg (337 lb 4.9 oz)     Estimated Nutrition Needs:   Kcals/day: 2500 Kcals/day (2692-4886 kcal/day ([place] [address] Jeor x 1.2-1.3))  Protein: 110 g (110-136 g/day (2-2.5 g/kg IBW))  Fluid:  (1  ml/kcal)     Based On: [place] [address] Jeor  Weight Used: Actual wt (142.7 kg standing scale on 7/19)    RD to follow.     [address]

## 2017-07-31 NOTE — PROGRESS NOTES
Bedside and Verbal shift change report given to PG&E Gelesis (oncoming nurse) by Coreen Zuñiga (offgoing nurse). Report included the following information SBAR, Kardex, Intake/Output, Recent Results and Cardiac Rhythm nsr/sinus tach.

## 2017-07-31 NOTE — PROGRESS NOTES
ID Progress Note  2017    Subjective:     Problems with the wound vac over the weekend    Objective:     Antibiotics:  1. Zosyn   2. Eraxis      Vitals:   Visit Vitals    /61 (BP 1 Location: Right arm, BP Patient Position: At rest)    Pulse 92    Temp 98 °F (36.7 °C)    Resp 18    Ht 5' 4\" (1.626 m)    Wt 153 kg (337 lb 4.9 oz)    SpO2 98%    Breastfeeding No    BMI 57.9 kg/m2        Tmax:  Temp (24hrs), Av.3 °F (36.8 °C), Min:98 °F (36.7 °C), Max:98.7 °F (37.1 °C)      Exam:  Lungs:  clear to auscultation bilaterally  Heart:  regularly irregular rhythm  Abdomen:  abnormal findings:  tenderness moderate in the entire abdomen, hypoactive bowel sounds    Labs:      Recent Labs      17   0439  17   0252  17   0507   WBC  9.3  9.7  10.8   HGB  8.3*  8.3*  7.5*   PLT  377  353  323   BUN  22*  21*  22*   CREA  0.83  0.92  0.91       Cultures:     Lab Results   Component Value Date/Time    Specimen Description: URINE 2009 07:45 PM     Lab Results   Component Value Date/Time    Culture result: NO GROWTH ON SOLID MEDIA AT 4 DAYS 2017 12:17 PM    Culture result:  2017 12:17 PM     **METHICILLIN RESISTANT STAPHYLOCOCCUS AUREUS**  ISOLATED FROM THIO BROTH ONLY      Culture result: NO GROWTH 5 DAYS 2017 02:35 PM       Radiology:     Line/Insert Date:           Assessment:     1. Ischemic gut with frozen abdomen  2. Leukocytosis--back up after surgery--trending back down overall--down to normal  3. Cultures negative to date    Objective:     1. Continue IV therapy--will start to remove antibiotics this week if no objections from other attendings  2. Follow up cultures and studies  3.  Work up fevers    Reinaldo Abraham MD

## 2017-07-31 NOTE — PROGRESS NOTES
Problem: Mobility Impaired (Adult and Pediatric)  Goal: *Acute Goals and Plan of Care (Insert Text)  Physical Therapy Goals  Revised 7/28/2017  1. Patient will transfer from bed to chair and chair to bed with modified independence using the least restrictive device within 7 day(s). 2. Patient will perform sit to stand with modified independence within 7 day(s). 3. Patient will ambulate with modified independence for 250 feet with the least restrictive device within 7 day(s). 4. Patient will progress to ambulating in the hallway within 7 days. 5. Patient will ascend and descend 4 steps within 7 days. Physical Therapy Goals  Revised 7/20/2017  1. Patient will move from supine to sit and sit to supine , scoot up and down and roll side to side in bed with independence within 7 day(s). 2. Patient will transfer from bed to chair and chair to bed with supervision/set-up using the least restrictive device within 7 day(s). 3. Patient will perform sit to stand with supervision/set-up within 7 day(s). 4. Patient will ambulate with supervision/set-up for 200 feet with the least restrictive device within 7 day(s). 5. Patient will ascend/descend 7 stairs with one handrail(s) with supervision/set-up within 7 day(s). Physical Therapy Goals  7/12/2017  1. Patient will move from supine to sit and sit to supine , scoot up and down and roll side to side in bed with independence within 7 day(s). 2. Patient will transfer from bed to chair and chair to bed with supervision/set-up using the least restrictive device within 7 day(s). 3. Patient will perform sit to stand with supervision/set-up within 7 day(s). 4. Patient will ambulate with supervision/set-up for 50 feet with the least restrictive device within 7 day(s). PHYSICAL THERAPY TREATMENT  Patient:  Edel Desouza (29 y.o. female)  Date: 7/31/2017  Diagnosis: N/V intractable post-opt pain  Abdominal pain  poor iv access  DEAD BOWEL  SMALL BOWEL OBSTRUCTION  central line placement  Abdominal pain <principal problem not specified>  Procedure(s) (LRB):  SPECIAL PROCEDURE OUTSIDE OF OR (N/A) 18 Days Post-Op  Precautions:    Chart, physical therapy assessment, plan of care and goals were reviewed. ASSESSMENT:  Pt received supine in bed, family member present. Pt deferring OOB activity as she reported increased pain today in abdomen at wound vac and drain sites. Pt reported wound vac changed yesterday but still having pain as a result. Pt requesting to do exercises in bed (PCA button pressed by pt at this point). Pt only able to tolerate heel slides and SLR (B) w/use of pillow as splint. Pt w/ much pain during exercises and further exercises deferred as pt getting emotional and began crying at this point stating \" I'm sorry, I feel like I'm letting you down. I would work my butt off for you right now if I could\". Therapist provided emotional support and reassurance as well as cues for deep breathing to relax as pt HR elevated to 110-teens (HR decreased to 93); pt pressed PCA button for pain med. Encouraged pt to continue using pillow as splint, log roll technique when getting OOB. Will continue to follow pt for OOB bed activity as able and tolerated by pt. Progression toward goals:  [ ]      Improving appropriately and progressing toward goals  [X]      Improving slowly and progressing toward goals  [ ]      Not making progress toward goals and plan of care will be adjusted       PLAN:  Patient continues to benefit from skilled intervention to address the above impairments. Continue treatment per established plan of care. Discharge Recommendations:  Home health vs None vs TBD pending pt progress as pt deferred OOB activity 7/31/17  Further Equipment Recommendations for Discharge:  None       SUBJECTIVE:   Patient stated \" I don't know. I feel like if I try I might be doing too much.       OBJECTIVE DATA SUMMARY:   Critical Behavior:  Neurologic State: Alert, Appropriate for age  Orientation Level: Oriented X4  Cognition: Appropriate decision making, Appropriate for age attention/concentration, Appropriate safety awareness                                                                          Ambulation/Gait Training:      Pt deferred OOB activity d/t abdominal pain (wound and drain sites)                                                                            Therapeutic Exercises:   Supine BLE (x10); Heel slides, SLR  Pain:  Pain Scale 1: Numeric (0 - 10)  Pain Intensity 1: 8  Pain Location 1: Abdomen  Pain Orientation 1: Anterior  Pain Description 1: Aching  Pain Intervention(s) 1: Medication (see MAR)  Activity Tolerance:   Limited d/t abdominal pain (wound and drain sites)  Please refer to the flowsheet for vital signs taken during this treatment.   After treatment:   [ ] Patient left in no apparent distress sitting up in chair  [X] Patient left in no apparent distress in bed  [X] Call bell left within reach  [X] Nursing notified  [X] Caregiver present  [ ] Bed alarm activated      COMMUNICATION/COLLABORATION:   The patients plan of care was discussed with: Registered Nurse Janice Mac PTA   Time Calculation: 15 mins

## 2017-08-01 LAB
ANION GAP BLD CALC-SCNC: 7 MMOL/L (ref 5–15)
BASOPHILS # BLD AUTO: 0 K/UL (ref 0–0.1)
BASOPHILS # BLD: 0 % (ref 0–1)
BUN SERPL-MCNC: 21 MG/DL (ref 6–20)
BUN/CREAT SERPL: 25 (ref 12–20)
CALCIUM SERPL-MCNC: 8.7 MG/DL (ref 8.5–10.1)
CHLORIDE SERPL-SCNC: 111 MMOL/L (ref 97–108)
CO2 SERPL-SCNC: 23 MMOL/L (ref 21–32)
CREAT SERPL-MCNC: 0.85 MG/DL (ref 0.55–1.02)
EOSINOPHIL # BLD: 0.5 K/UL (ref 0–0.4)
EOSINOPHIL NFR BLD: 5 % (ref 0–7)
ERYTHROCYTE [DISTWIDTH] IN BLOOD BY AUTOMATED COUNT: 17.3 % (ref 11.5–14.5)
GLUCOSE BLD STRIP.AUTO-MCNC: 162 MG/DL (ref 65–100)
GLUCOSE BLD STRIP.AUTO-MCNC: 91 MG/DL (ref 65–100)
GLUCOSE BLD STRIP.AUTO-MCNC: 92 MG/DL (ref 65–100)
GLUCOSE BLD STRIP.AUTO-MCNC: 98 MG/DL (ref 65–100)
GLUCOSE SERPL-MCNC: 97 MG/DL (ref 65–100)
HCT VFR BLD AUTO: 27.8 % (ref 35–47)
HGB BLD-MCNC: 8.6 G/DL (ref 11.5–16)
LYMPHOCYTES # BLD AUTO: 31 % (ref 12–49)
LYMPHOCYTES # BLD: 3.3 K/UL (ref 0.8–3.5)
MAGNESIUM SERPL-MCNC: 1.8 MG/DL (ref 1.6–2.4)
MCH RBC QN AUTO: 28.5 PG (ref 26–34)
MCHC RBC AUTO-ENTMCNC: 30.9 G/DL (ref 30–36.5)
MCV RBC AUTO: 92.1 FL (ref 80–99)
MONOCYTES # BLD: 1 K/UL (ref 0–1)
MONOCYTES NFR BLD AUTO: 9 % (ref 5–13)
NEUTS SEG # BLD: 5.7 K/UL (ref 1.8–8)
NEUTS SEG NFR BLD AUTO: 55 % (ref 32–75)
PHOSPHATE SERPL-MCNC: 3.9 MG/DL (ref 2.6–4.7)
PLATELET # BLD AUTO: 385 K/UL (ref 150–400)
POTASSIUM SERPL-SCNC: 4.1 MMOL/L (ref 3.5–5.1)
RBC # BLD AUTO: 3.02 M/UL (ref 3.8–5.2)
SERVICE CMNT-IMP: ABNORMAL
SERVICE CMNT-IMP: NORMAL
SODIUM SERPL-SCNC: 141 MMOL/L (ref 136–145)
WBC # BLD AUTO: 10.5 K/UL (ref 3.6–11)

## 2017-08-01 PROCEDURE — 3331090002 HH PPS REVENUE DEBIT

## 2017-08-01 PROCEDURE — 74011250636 HC RX REV CODE- 250/636: Performed by: EMERGENCY MEDICINE

## 2017-08-01 PROCEDURE — C9113 INJ PANTOPRAZOLE SODIUM, VIA: HCPCS | Performed by: SURGERY

## 2017-08-01 PROCEDURE — 74011250636 HC RX REV CODE- 250/636: Performed by: INTERNAL MEDICINE

## 2017-08-01 PROCEDURE — 85025 COMPLETE CBC W/AUTO DIFF WBC: CPT | Performed by: SURGERY

## 2017-08-01 PROCEDURE — 74011000250 HC RX REV CODE- 250: Performed by: INTERNAL MEDICINE

## 2017-08-01 PROCEDURE — 82962 GLUCOSE BLOOD TEST: CPT

## 2017-08-01 PROCEDURE — 74011250636 HC RX REV CODE- 250/636: Performed by: PHYSICAL MEDICINE & REHABILITATION

## 2017-08-01 PROCEDURE — 97606 NEG PRS WND THER DME>50 SQCM: CPT

## 2017-08-01 PROCEDURE — 74011000250 HC RX REV CODE- 250: Performed by: NURSE PRACTITIONER

## 2017-08-01 PROCEDURE — 74011250636 HC RX REV CODE- 250/636: Performed by: SURGERY

## 2017-08-01 PROCEDURE — 74011250636 HC RX REV CODE- 250/636: Performed by: NURSE PRACTITIONER

## 2017-08-01 PROCEDURE — 74011250637 HC RX REV CODE- 250/637: Performed by: PHYSICIAN ASSISTANT

## 2017-08-01 PROCEDURE — 77030019952 HC CANSTR VAC ASST KCON -B

## 2017-08-01 PROCEDURE — 97530 THERAPEUTIC ACTIVITIES: CPT | Performed by: PHYSICAL THERAPIST

## 2017-08-01 PROCEDURE — 77030018836 HC SOL IRR NACL ICUM -A

## 2017-08-01 PROCEDURE — 74011000258 HC RX REV CODE- 258: Performed by: SURGERY

## 2017-08-01 PROCEDURE — 74011000258 HC RX REV CODE- 258: Performed by: NURSE PRACTITIONER

## 2017-08-01 PROCEDURE — 74011000250 HC RX REV CODE- 250: Performed by: SURGERY

## 2017-08-01 PROCEDURE — 83735 ASSAY OF MAGNESIUM: CPT | Performed by: SURGERY

## 2017-08-01 PROCEDURE — 74011636637 HC RX REV CODE- 636/637: Performed by: SURGERY

## 2017-08-01 PROCEDURE — 97116 GAIT TRAINING THERAPY: CPT | Performed by: PHYSICAL THERAPIST

## 2017-08-01 PROCEDURE — 65660000000 HC RM CCU STEPDOWN

## 2017-08-01 PROCEDURE — 74011250637 HC RX REV CODE- 250/637: Performed by: SURGERY

## 2017-08-01 PROCEDURE — 74011000258 HC RX REV CODE- 258: Performed by: INTERNAL MEDICINE

## 2017-08-01 PROCEDURE — 36415 COLL VENOUS BLD VENIPUNCTURE: CPT | Performed by: SURGERY

## 2017-08-01 PROCEDURE — 80048 BASIC METABOLIC PNL TOTAL CA: CPT | Performed by: SURGERY

## 2017-08-01 PROCEDURE — 3331090001 HH PPS REVENUE CREDIT

## 2017-08-01 PROCEDURE — 84100 ASSAY OF PHOSPHORUS: CPT | Performed by: SURGERY

## 2017-08-01 PROCEDURE — 74011636637 HC RX REV CODE- 636/637: Performed by: NURSE PRACTITIONER

## 2017-08-01 RX ORDER — SILVER NITRATE 38.21; 12.74 MG/1; MG/1
3 STICK TOPICAL ONCE
Status: COMPLETED | OUTPATIENT
Start: 2017-08-01 | End: 2017-08-01

## 2017-08-01 RX ADMIN — HUMAN INSULIN 2 UNITS: 100 INJECTION, SOLUTION SUBCUTANEOUS at 23:45

## 2017-08-01 RX ADMIN — Medication: at 10:00

## 2017-08-01 RX ADMIN — SODIUM CHLORIDE 40 MG: 9 INJECTION INTRAMUSCULAR; INTRAVENOUS; SUBCUTANEOUS at 10:03

## 2017-08-01 RX ADMIN — Medication: at 17:59

## 2017-08-01 RX ADMIN — ONDANSETRON 8 MG: 2 INJECTION INTRAMUSCULAR; INTRAVENOUS at 10:11

## 2017-08-01 RX ADMIN — ASPIRIN 81 MG CHEWABLE TABLET 81 MG: 81 TABLET CHEWABLE at 10:02

## 2017-08-01 RX ADMIN — NYSTATIN 500000 UNITS: 100000 SUSPENSION ORAL at 14:22

## 2017-08-01 RX ADMIN — HYDROMORPHONE HYDROCHLORIDE 1 MG: 1 INJECTION, SOLUTION INTRAMUSCULAR; INTRAVENOUS; SUBCUTANEOUS at 23:52

## 2017-08-01 RX ADMIN — Medication 10 ML: at 18:02

## 2017-08-01 RX ADMIN — SILVER NITRATE APPLICATORS 3 APPLICATOR: 25; 75 STICK TOPICAL at 16:35

## 2017-08-01 RX ADMIN — HYDROMORPHONE HYDROCHLORIDE 1 MG: 1 INJECTION, SOLUTION INTRAMUSCULAR; INTRAVENOUS; SUBCUTANEOUS at 21:21

## 2017-08-01 RX ADMIN — POTASSIUM CHLORIDE: 2 INJECTION, SOLUTION, CONCENTRATE INTRAVENOUS at 19:09

## 2017-08-01 RX ADMIN — Medication 10 ML: at 06:22

## 2017-08-01 RX ADMIN — OCTREOTIDE ACETATE 50 MCG: 50 INJECTION, SOLUTION INTRAVENOUS; SUBCUTANEOUS at 10:03

## 2017-08-01 RX ADMIN — Medication 10 ML: at 21:21

## 2017-08-01 RX ADMIN — PIPERACILLIN SODIUM,TAZOBACTAM SODIUM 3.38 G: 3; .375 INJECTION, POWDER, FOR SOLUTION INTRAVENOUS at 06:19

## 2017-08-01 RX ADMIN — NYSTATIN 500000 UNITS: 100000 SUSPENSION ORAL at 10:03

## 2017-08-01 RX ADMIN — Medication 0.76 MG: at 14:23

## 2017-08-01 RX ADMIN — ONDANSETRON 8 MG: 2 INJECTION INTRAMUSCULAR; INTRAVENOUS at 21:21

## 2017-08-01 RX ADMIN — SODIUM CHLORIDE 100 MG: 900 INJECTION, SOLUTION INTRAVENOUS at 17:54

## 2017-08-01 RX ADMIN — PIPERACILLIN SODIUM,TAZOBACTAM SODIUM 3.38 G: 3; .375 INJECTION, POWDER, FOR SOLUTION INTRAVENOUS at 21:21

## 2017-08-01 RX ADMIN — CLOPIDOGREL BISULFATE 75 MG: 75 TABLET ORAL at 10:02

## 2017-08-01 RX ADMIN — Medication 0.76 MG: at 21:21

## 2017-08-01 RX ADMIN — Medication 10 ML: at 14:29

## 2017-08-01 RX ADMIN — SODIUM CHLORIDE 5 MG: 9 INJECTION INTRAMUSCULAR; INTRAVENOUS; SUBCUTANEOUS at 10:30

## 2017-08-01 RX ADMIN — HYDROMORPHONE HYDROCHLORIDE 1 MG: 1 INJECTION, SOLUTION INTRAMUSCULAR; INTRAVENOUS; SUBCUTANEOUS at 20:15

## 2017-08-01 RX ADMIN — HYDROMORPHONE HYDROCHLORIDE 1 MG: 1 INJECTION, SOLUTION INTRAMUSCULAR; INTRAVENOUS; SUBCUTANEOUS at 06:05

## 2017-08-01 RX ADMIN — PIPERACILLIN SODIUM,TAZOBACTAM SODIUM 3.38 G: 3; .375 INJECTION, POWDER, FOR SOLUTION INTRAVENOUS at 14:27

## 2017-08-01 RX ADMIN — NYSTATIN 500000 UNITS: 100000 SUSPENSION ORAL at 17:54

## 2017-08-01 RX ADMIN — HYDROMORPHONE HYDROCHLORIDE 1 MG: 1 INJECTION, SOLUTION INTRAMUSCULAR; INTRAVENOUS; SUBCUTANEOUS at 10:30

## 2017-08-01 RX ADMIN — OCTREOTIDE ACETATE 50 MCG: 50 INJECTION, SOLUTION INTRAVENOUS; SUBCUTANEOUS at 21:21

## 2017-08-01 RX ADMIN — Medication 0.76 MG: at 06:00

## 2017-08-01 RX ADMIN — HYDROMORPHONE HYDROCHLORIDE 1 MG: 1 INJECTION, SOLUTION INTRAMUSCULAR; INTRAVENOUS; SUBCUTANEOUS at 14:23

## 2017-08-01 NOTE — WOUND CARE
LATE ENTRY:  Seen yesterday ~1500. Discussed VAC change with Dr. Valentin Box done by him 7/30/17. Will plan on changing tomorrow. VAC with good seal and no enteric contents noted around the VAC drape.    Rylee Cazares, BRYANN

## 2017-08-01 NOTE — PROGRESS NOTES
Problem: Mobility Impaired (Adult and Pediatric)  Goal: *Acute Goals and Plan of Care (Insert Text)  Physical Therapy Goals  Revised 7/28/2017  1. Patient will transfer from bed to chair and chair to bed with modified independence using the least restrictive device within 7 day(s). 2. Patient will perform sit to stand with modified independence within 7 day(s). 3. Patient will ambulate with modified independence for 250 feet with the least restrictive device within 7 day(s). 4. Patient will progress to ambulating in the hallway within 7 days. 5. Patient will ascend and descend 4 steps within 7 days. Physical Therapy Goals  Revised 7/20/2017  1. Patient will move from supine to sit and sit to supine , scoot up and down and roll side to side in bed with independence within 7 day(s). 2. Patient will transfer from bed to chair and chair to bed with supervision/set-up using the least restrictive device within 7 day(s). 3. Patient will perform sit to stand with supervision/set-up within 7 day(s). 4. Patient will ambulate with supervision/set-up for 200 feet with the least restrictive device within 7 day(s). 5. Patient will ascend/descend 7 stairs with one handrail(s) with supervision/set-up within 7 day(s). Physical Therapy Goals  7/12/2017  1. Patient will move from supine to sit and sit to supine , scoot up and down and roll side to side in bed with independence within 7 day(s). 2. Patient will transfer from bed to chair and chair to bed with supervision/set-up using the least restrictive device within 7 day(s). 3. Patient will perform sit to stand with supervision/set-up within 7 day(s). 4. Patient will ambulate with supervision/set-up for 50 feet with the least restrictive device within 7 day(s). PHYSICAL THERAPY TREATMENT  Patient:  Julián Chen (76 y.o. female)  Date: 8/1/2017  Diagnosis: N/V intractable post-opt pain  Abdominal pain  poor iv access  DEAD BOWEL  SMALL BOWEL OBSTRUCTION  central line placement  Abdominal pain <principal problem not specified>  Procedure(s) (LRB):  SPECIAL PROCEDURE OUTSIDE OF OR (N/A) 19 Days Post-Op  Precautions:    Chart, physical therapy assessment, plan of care and goals were reviewed. ASSESSMENT:  Patient continues to slowly increase her distance ambulating. Not yet ready to go into the hallway. Today she ambulated to and used the [facility], washed her hands in the bathroom sink, and ambulated 6 laps back and forth in the room with CGA. Ambulated 150 feet. Requires assist of 2 for all her lines however she has a few less today as some were removed. She is encouraged to have her mother bring in some shampoo as she would be able to stand over the bathroom sink to get her hair washed. She is going to try to do this as her mother comes in regularly to help her with her bath. She continues to be motivated and likes to be up out of the bed in the chair. She was up on her feet OOB for 10 minutes. Progression toward goals:  [X]      Improving appropriately and progressing toward goals  [ ]      Improving slowly and progressing toward goals  [ ]      Not making progress toward goals and plan of care will be adjusted       PLAN:  Patient continues to benefit from skilled intervention to address the above impairments. Continue treatment per established plan of care. Discharge Recommendations:  Home Health  Further Equipment Recommendations for Discharge:  none       SUBJECTIVE:   Patient stated I can get up.       OBJECTIVE DATA SUMMARY:   Critical Behavior:  Neurologic State: Alert, Appropriate for age  Orientation Level: Oriented X4  Cognition: Appropriate decision making, Appropriate for age attention/concentration, Appropriate safety awareness, Follows commands     Functional Mobility Training:  Bed Mobility:  Rolling: Modified independent  Supine to Sit: Supervision  Sit to Supine: Supervision  Scooting: Independent Transfers:  Sit to Stand: Supervision  Stand to Sit: Supervision                             Balance:  Sitting: Intact  Standing: Intact  Standing - Static: Good  Standing - Dynamic : Good  Ambulation/Gait Training:  Distance (ft): 150 Feet (ft)  Assistive Device: Other (comment) (Holds the IV pole)  Ambulation - Level of Assistance: Contact guard assistance        Gait Abnormalities: Decreased step clearance;Trunk sway increased        Base of Support: Widened     Speed/Liz: Slow  Step Length: Left shortened;Right shortened                                         Pain:  Pain Scale 1: Numeric (0 - 10)  Pain Intensity 1: 8  Pain Location 1: Abdomen  Pain Orientation 1: Anterior  Pain Description 1: Aching  Pain Intervention(s) 1: Medication (see MAR)  Activity Tolerance:   Good. Please refer to the flowsheet for vital signs taken during this treatment. After treatment:   [ ] Patient left in no apparent distress sitting up in chair  [X] Patient left in no apparent distress in bed  [X] Call bell left within reach  [X] Nursing notified  [ ] Caregiver present  [ ] Bed alarm activated      COMMUNICATION/COLLABORATION:   The patients plan of care was discussed with: Registered Nurse, rehab attendant.       Florentino Locke, PT   Time Calculation: 25 mins

## 2017-08-01 NOTE — PROGRESS NOTES
Bedside and Verbal shift change report given to haja (oncoming nurse) by Juliane Bunn (offgoing nurse). Report included the following information SBAR, Kardex, Intake/Output, Recent Results and Cardiac Rhythm nsr.

## 2017-08-01 NOTE — PROGRESS NOTES
Patient discussed during IDR this am. Patient remains on TPN as main source of nutrition, NPO except for ice chips. Wound vac changed yesterday. Patient remains on PCA pump for pain management, Zosyn and Eraxis. Vancomycin IV d/c'd. Patient up with PT and ambulating. CM noted PT recommending home health when medically stable. CM will continue to follow for discharge needs.  Moshe Larson MSA, RN,, CRM

## 2017-08-01 NOTE — PROGRESS NOTES
General Surgery Daily Progress Note    Admit Date: 2017  Post-Operative Day: 19 Days Post-Op from Procedure(s) with comments:  SPECIAL PROCEDURE OUTSIDE OF OR - central line placement     Subjective:     Last 24 hrs: Pt c/o pain around L side malecot drain. Talked to pt about need for removing central line (has been in 21 days) and trying to get a PICC line in. Merlin Siddiqi is capped      Objective:     Blood pressure 123/47, pulse (!) 102, temperature 98.3 °F (36.8 °C), resp. rate 16, height 5' 4\" (1.626 m), weight 339 lb 1.1 oz (153.8 kg), SpO2 100 %, not currently breastfeeding. Temp (24hrs), Av.3 °F (36.8 °C), Min:98 °F (36.7 °C), Max:98.7 °F (37.1 °C)      _____________________  Physical Exam:     Alert and Oriented, x3, in no acute distress. Cardiovascular: RRR, no peripheral edema  Lungs:CTAB anteriorally  Abdomen: soft, L malecot w/ erythema at insertion site and is tender, still stitched appropriately in place; wound vac w/ constant drainage; R malecot intact and fred drain capped off      Assessment:   Active Problems:    Abdominal pain (2017)      Small bowel ischemia ([address] (2017)      Overview: CT abd/pelvis 17      1. The stomach and proximal small bowel loops are distended. There is a       loop of      small bowel in the midabdomen which is mildly dilated and does not       demonstrate      enhancement in the wall and there is suspicion of pneumatosis and this       leads to      a loop of small bowel which demonstrates thickening of the wall and       findings are      suspicious for ischemia. 2. There is extensive mesenteric edema and a small amount of ascites in       the      pelvis. 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 There is nonocclusive thrombus in the SMA.             Enterocutaneous fistula (2017)            Plan:     Vac chg today  PICC line today, then remove central line  Renew TPN  Gi/dvt proph - on plavix  Cont abx    Data Review:    Recent Labs      08/01/17   0632  07/31/17   0439  07/30/17   0252   WBC  10.5  9.3  9.7   HGB  8.6*  8.3*  8.3*   HCT  27.8*  27.4*  26.7*   PLT  385  377  353     Recent Labs      08/01/17   0632  07/31/17   0439  07/30/17   0252   NA  141  140  141   K  4.1  4.1  4.1   CL  111*  109*  110*   CO2  23  24  24   GLU  97  99  90   BUN  21*  22*  21*   CREA  0.85  0.83  0.92   CA  8.7  8.6  8.7   MG  1.8  2.0  1.8   PHOS  3.9  4.0  4.3     No results for input(s): AML, LPSE in the last 72 hours.         ______________________  Medications:    Current Facility-Administered Medications   Medication Dose Route Frequency    nystatin (MYCOSTATIN) 100,000 unit/mL oral suspension 500,000 Units  500,000 Units Oral QID    0.9% sodium chloride infusion 250 mL  250 mL IntraVENous PRN    acetaminophen (TYLENOL) tablet 650 mg  650 mg Oral Q4H PRN    LORazepam (ATIVAN) injection 0.76 mg  0.76 mg IntraVENous Q8H    albuterol (PROVENTIL VENTOLIN) nebulizer solution 2.5 mg  2.5 mg Inhalation Q4H PRN    octreotide (SANDOSTATIN) injection 50 mcg  50 mcg IntraVENous TID    clopidogrel (PLAVIX) tablet 75 mg  75 mg Oral DAILY    sodium chloride (NS) flush 10 mL  10 mL InterCATHeter Q24H    sodium chloride (NS) flush 10 mL  10 mL InterCATHeter PRN    sodium chloride (NS) flush 10-40 mL  10-40 mL InterCATHeter Q8H    alteplase (CATHFLO) 1 mg in sterile water (preservative free) 1 mL injection  1 mg InterCATHeter PRN    bacitracin 500 unit/gram packet 1 Packet  1 Packet Topical PRN    0.9% sodium chloride infusion 250 mL  250 mL IntraVENous PRN    lactated Ringers infusion  25 mL/hr IntraVENous CONTINUOUS    fat emulsion 20% (LIPOSYN, INTRALIPID) infusion 500 mL  500 mL IntraVENous Q MON, WED & FRI    HYDROmorphone (PF) (DILAUDID) injection 1 mg  1 mg IntraVENous Q1H PRN    aspirin chewable tablet 81 mg  81 mg Oral DAILY    insulin regular (NOVOLIN R, HUMULIN R) injection   SubCUTAneous Q6H    morphine (PF)  mg/30 ml   IntraVENous CONTINUOUS    glucose chewable tablet 16 g  4 Tab Oral PRN    glucagon (GLUCAGEN) injection 1 mg  1 mg IntraMUSCular PRN    dextrose 10 % infusion 125-250 mL  125-250 mL IntraVENous PRN    pantoprazole (PROTONIX) 40 mg in sodium chloride 0.9 % 10 mL injection  40 mg IntraVENous DAILY    prochlorperazine (COMPAZINE) with saline injection 5 mg  5 mg IntraVENous Q6H PRN    anidulafungin (ERAXIS) 100 mg in 0.9% sodium chloride 130 mL IVPB  100 mg IntraVENous Q24H    ondansetron (ZOFRAN) injection 8 mg  8 mg IntraVENous Q6H PRN    sodium chloride (NS) flush 5-10 mL  5-10 mL IntraVENous Q8H    sodium chloride (NS) flush 5-10 mL  5-10 mL IntraVENous PRN    naloxone (NARCAN) injection 0.4 mg  0.4 mg IntraVENous PRN    diphenhydrAMINE (BENADRYL) injection 12.5 mg  12.5 mg IntraVENous Q4H PRN    piperacillin-tazobactam (ZOSYN) 3.375 g in 0.9% sodium chloride (MBP/ADV) 100 mL  3.375 g IntraVENous Q8H       Kishore Pike NP  8/1/2017      ADDENDUM:  Samir Nieves MD  Pt seen and examined. Malecot drain on left side migrated out. Both malecots were removed. Red rubber drain was placed into left stoma opening. Continue TPN for now. NPO with sips. Continue antibiotic. Continue wound vac therapy. Appreciate wound care RN Jania's assistance with management of wound.

## 2017-08-01 NOTE — PROGRESS NOTES
Problem: Surgical Wound Care  Goal: *Improvement of existing wound and maintenance of skin integrity  Outcome: Progressing Towards Goal  All dressings changed today by wound care    Problem: Pain  Goal: *Control of Pain  Outcome: Progressing Towards Goal  Patient's pain is being controlled with the use of PCA and breakthrough medication.

## 2017-08-01 NOTE — WOUND CARE
WOCN Note:     Follow-up visit for VAC dressing change. Chart shows:  Admitted for nausea, vomiting, and abdominal pain; history of Crohn's, DVT, bowel perforation with surgery 6/7/17. Exploratory lap, KATHRINE, fistula exclusion and tube placement by Dr. Cher Tavera 7/7/17. Assessment:   Patient is A&O x4 and mobile around room. Bed: total care bariatric sport  Patient using PureWick. Diet: NPO with sips & TPN  Pre-medicated for pain by RN and using PCA throughout visit. Seen at bedside first with Mika Hearn, NP, and noticed the Malecot drains had migrated out of the lumen of the intestines. She called Dr. Cher Tavera who came to bedside and removed both drains and placed a red rubber catheter into proximal lumen using the previous track through the skin. Red rubber secured with horizontal drain secure. Small amount of bleeding noted on right side of wound where distal catheter removed. Surgicel placed. Mucus fistula LLQ: red & moist and almost at skin level; some mucosal transplantation noted; output is light green mucus. Activelife flat pouch placed.       1. Midline abdominal incision with three drains visible in the base = 17 x 11 x 4 cm.  Ana. Base is 50% moist red mucosal tissue with peristalsis, 10% scattered moist yellow, 40% moist pink with some mucus. Dressed using 2 pieces of petroleum gauze as base layer over most of wound bed, 1 white sponge (Rabia drain rests on top of white sponge), 1 black sponge. Recommendations:    Maintain VAC as ordered. Discussed above plan with patient & RN. Transition of Care: Plan to follow as needed while admitted to hospital with next [facility] change Friday.     YOLANDA FreemanN, RN, [facility]  Certified Wound, Ostomy, Continence Nurse  office [phone]  pager [phone] or call  to page    [id number]: follow-up for [facility] after change today with small bleed on right margin after drain removal.  Now with 250cc of red serosanguinous exudate in cannister and clot noted to be lifting the drape on right margin. VAC turned off and dressing left in place - Dr. Jeff Greco reached by phone. He requested me to removed VAC and look, apply surgicel as needed and decide between [facility] or moist pakcing based on amount of bleeding noted. He will see later today. AMPARO Rosen    1600: VAC dressing removed; large, palm-sized clot had lifted the petroleum. Trickle of blood noted from mucus membrane on right side. 3 more surgicels added with packing after protective petroleum layer applied to bowel. RN, Tiffanie Maldonado, at bedside and aware of current issues. She will monitor vitals and inform Dr. Jeff Greco. Also made Denisse Ortiz, MAURI, aware of status and need for close monitoring. Unable to reach Dr. Jeff Greco at this time - message left.   AMPARO Rosen

## 2017-08-01 NOTE — PROGRESS NOTES
ID Progress Note  2017    Subjective:     Problems with the wound vac over the weekend    Objective:     Antibiotics:  1. Zosyn   2. Eraxis      Vitals:   Visit Vitals    /80    Pulse (!) 106    Temp 98.3 °F (36.8 °C)    Resp 14    Ht 5' 4\" (1.626 m)    Wt 153.8 kg (339 lb 1.1 oz)    SpO2 98%    Breastfeeding No    BMI 58.2 kg/m2        Tmax:  Temp (24hrs), Av.4 °F (36.9 °C), Min:98.3 °F (36.8 °C), Max:98.7 °F (37.1 °C)      Exam:  Lungs:  clear to auscultation bilaterally  Heart:  regularly irregular rhythm  Abdomen:  abnormal findings:  tenderness moderate in the entire abdomen, hypoactive bowel sounds    Labs:      Recent Labs      17   0632  17   0439  17   0252   WBC  10.5  9.3  9.7   HGB  8.6*  8.3*  8.3*   PLT  385  377  353   BUN  21*  22*  21*   CREA  0.85  0.83  0.92       Cultures:     Lab Results   Component Value Date/Time    Specimen Description: URINE 2009 07:45 PM     Lab Results   Component Value Date/Time    Culture result: NO GROWTH ON SOLID MEDIA AT 4 DAYS 2017 12:17 PM    Culture result:  2017 12:17 PM     **METHICILLIN RESISTANT STAPHYLOCOCCUS AUREUS**  ISOLATED FROM THIO BROTH ONLY      Culture result: NO GROWTH 5 DAYS 2017 02:35 PM       Radiology:     Line/Insert Date:           Assessment:     1. Ischemic gut with frozen abdomen  2. Leukocytosis--back up after surgery--trending back down overall--down to normal  3. Cultures negative to date    Objective:     1. Vancomycin stopped yesterday  2. Follow up cultures and studies  3.  Work up fevers    Stalin Diehl MD

## 2017-08-02 ENCOUNTER — APPOINTMENT (OUTPATIENT)
Dept: INTERVENTIONAL RADIOLOGY/VASCULAR | Age: 42
DRG: 335 | End: 2017-08-02
Attending: NURSE PRACTITIONER
Payer: MEDICARE

## 2017-08-02 LAB
ANION GAP BLD CALC-SCNC: 6 MMOL/L (ref 5–15)
BASOPHILS # BLD AUTO: 0 K/UL (ref 0–0.1)
BASOPHILS # BLD: 0 % (ref 0–1)
BUN SERPL-MCNC: 24 MG/DL (ref 6–20)
BUN/CREAT SERPL: 30 (ref 12–20)
CALCIUM SERPL-MCNC: 8.7 MG/DL (ref 8.5–10.1)
CHLORIDE SERPL-SCNC: 112 MMOL/L (ref 97–108)
CO2 SERPL-SCNC: 25 MMOL/L (ref 21–32)
CREAT SERPL-MCNC: 0.79 MG/DL (ref 0.55–1.02)
EOSINOPHIL # BLD: 0.4 K/UL (ref 0–0.4)
EOSINOPHIL NFR BLD: 4 % (ref 0–7)
ERYTHROCYTE [DISTWIDTH] IN BLOOD BY AUTOMATED COUNT: 17.1 % (ref 11.5–14.5)
GLUCOSE BLD STRIP.AUTO-MCNC: 100 MG/DL (ref 65–100)
GLUCOSE BLD STRIP.AUTO-MCNC: 108 MG/DL (ref 65–100)
GLUCOSE BLD STRIP.AUTO-MCNC: 125 MG/DL (ref 65–100)
GLUCOSE BLD STRIP.AUTO-MCNC: 125 MG/DL (ref 65–100)
GLUCOSE SERPL-MCNC: 103 MG/DL (ref 65–100)
HCT VFR BLD AUTO: 24.6 % (ref 35–47)
HGB BLD-MCNC: 7.6 G/DL (ref 11.5–16)
LYMPHOCYTES # BLD AUTO: 41 % (ref 12–49)
LYMPHOCYTES # BLD: 3.9 K/UL (ref 0.8–3.5)
MAGNESIUM SERPL-MCNC: 1.9 MG/DL (ref 1.6–2.4)
MCH RBC QN AUTO: 28.5 PG (ref 26–34)
MCHC RBC AUTO-ENTMCNC: 30.9 G/DL (ref 30–36.5)
MCV RBC AUTO: 92.1 FL (ref 80–99)
MONOCYTES # BLD: 0.9 K/UL (ref 0–1)
MONOCYTES NFR BLD AUTO: 9 % (ref 5–13)
NEUTS SEG # BLD: 4.4 K/UL (ref 1.8–8)
NEUTS SEG NFR BLD AUTO: 46 % (ref 32–75)
PHOSPHATE SERPL-MCNC: 4.4 MG/DL (ref 2.6–4.7)
PLATELET # BLD AUTO: 336 K/UL (ref 150–400)
POTASSIUM SERPL-SCNC: 4.1 MMOL/L (ref 3.5–5.1)
RBC # BLD AUTO: 2.67 M/UL (ref 3.8–5.2)
SERVICE CMNT-IMP: ABNORMAL
SERVICE CMNT-IMP: NORMAL
SODIUM SERPL-SCNC: 143 MMOL/L (ref 136–145)
WBC # BLD AUTO: 9.6 K/UL (ref 3.6–11)

## 2017-08-02 PROCEDURE — 84100 ASSAY OF PHOSPHORUS: CPT | Performed by: SURGERY

## 2017-08-02 PROCEDURE — 77030018719 HC DRSG PTCH ANTIMIC J&J -A

## 2017-08-02 PROCEDURE — 74011250637 HC RX REV CODE- 250/637: Performed by: PHYSICIAN ASSISTANT

## 2017-08-02 PROCEDURE — 74011250636 HC RX REV CODE- 250/636: Performed by: NURSE PRACTITIONER

## 2017-08-02 PROCEDURE — 85025 COMPLETE CBC W/AUTO DIFF WBC: CPT | Performed by: SURGERY

## 2017-08-02 PROCEDURE — 74011250636 HC RX REV CODE- 250/636: Performed by: SURGERY

## 2017-08-02 PROCEDURE — 65660000000 HC RM CCU STEPDOWN

## 2017-08-02 PROCEDURE — 74011000258 HC RX REV CODE- 258: Performed by: NURSE PRACTITIONER

## 2017-08-02 PROCEDURE — 77030002996 HC SUT SLK J&J -A

## 2017-08-02 PROCEDURE — 36571 INSERT PICVAD CATH: CPT

## 2017-08-02 PROCEDURE — 06HY33Z INSERTION OF INFUSION DEVICE INTO LOWER VEIN, PERCUTANEOUS APPROACH: ICD-10-PCS | Performed by: RADIOLOGY

## 2017-08-02 PROCEDURE — 74011000258 HC RX REV CODE- 258: Performed by: INTERNAL MEDICINE

## 2017-08-02 PROCEDURE — 74011250636 HC RX REV CODE- 250/636: Performed by: INTERNAL MEDICINE

## 2017-08-02 PROCEDURE — 82962 GLUCOSE BLOOD TEST: CPT

## 2017-08-02 PROCEDURE — C1751 CATH, INF, PER/CENT/MIDLINE: HCPCS

## 2017-08-02 PROCEDURE — C9113 INJ PANTOPRAZOLE SODIUM, VIA: HCPCS | Performed by: SURGERY

## 2017-08-02 PROCEDURE — 74011250636 HC RX REV CODE- 250/636: Performed by: EMERGENCY MEDICINE

## 2017-08-02 PROCEDURE — 74011636637 HC RX REV CODE- 636/637: Performed by: NURSE PRACTITIONER

## 2017-08-02 PROCEDURE — 83735 ASSAY OF MAGNESIUM: CPT | Performed by: SURGERY

## 2017-08-02 PROCEDURE — 3331090002 HH PPS REVENUE DEBIT

## 2017-08-02 PROCEDURE — 36415 COLL VENOUS BLD VENIPUNCTURE: CPT | Performed by: SURGERY

## 2017-08-02 PROCEDURE — 3331090001 HH PPS REVENUE CREDIT

## 2017-08-02 PROCEDURE — 74011000258 HC RX REV CODE- 258: Performed by: SURGERY

## 2017-08-02 PROCEDURE — 97116 GAIT TRAINING THERAPY: CPT

## 2017-08-02 PROCEDURE — 36569 INSJ PICC 5 YR+ W/O IMAGING: CPT

## 2017-08-02 PROCEDURE — 74011250637 HC RX REV CODE- 250/637: Performed by: SURGERY

## 2017-08-02 PROCEDURE — 97606 NEG PRS WND THER DME>50 SQCM: CPT

## 2017-08-02 PROCEDURE — 77030018836 HC SOL IRR NACL ICUM -A

## 2017-08-02 PROCEDURE — 80048 BASIC METABOLIC PNL TOTAL CA: CPT | Performed by: SURGERY

## 2017-08-02 PROCEDURE — 74011000250 HC RX REV CODE- 250: Performed by: RADIOLOGY

## 2017-08-02 PROCEDURE — 74011000250 HC RX REV CODE- 250: Performed by: NURSE PRACTITIONER

## 2017-08-02 PROCEDURE — 77030018717 HC DRSG GRNUFM KCON -B

## 2017-08-02 PROCEDURE — 74011000250 HC RX REV CODE- 250: Performed by: SURGERY

## 2017-08-02 PROCEDURE — 97110 THERAPEUTIC EXERCISES: CPT

## 2017-08-02 PROCEDURE — 74011250636 HC RX REV CODE- 250/636: Performed by: PHYSICAL MEDICINE & REHABILITATION

## 2017-08-02 RX ORDER — LIDOCAINE HYDROCHLORIDE 20 MG/ML
20 INJECTION, SOLUTION INFILTRATION; PERINEURAL
Status: COMPLETED | OUTPATIENT
Start: 2017-08-02 | End: 2017-08-02

## 2017-08-02 RX ORDER — SODIUM CHLORIDE 0.9 % (FLUSH) 0.9 %
20 SYRINGE (ML) INJECTION AS NEEDED
Status: CANCELLED | OUTPATIENT
Start: 2017-08-02

## 2017-08-02 RX ADMIN — Medication 10 ML: at 21:03

## 2017-08-02 RX ADMIN — NYSTATIN 500000 UNITS: 100000 SUSPENSION ORAL at 13:29

## 2017-08-02 RX ADMIN — Medication 10 ML: at 16:48

## 2017-08-02 RX ADMIN — HYDROMORPHONE HYDROCHLORIDE 1 MG: 1 INJECTION, SOLUTION INTRAMUSCULAR; INTRAVENOUS; SUBCUTANEOUS at 09:50

## 2017-08-02 RX ADMIN — OCTREOTIDE ACETATE 50 MCG: 50 INJECTION, SOLUTION INTRAVENOUS; SUBCUTANEOUS at 16:47

## 2017-08-02 RX ADMIN — ASPIRIN 81 MG CHEWABLE TABLET 81 MG: 81 TABLET CHEWABLE at 09:54

## 2017-08-02 RX ADMIN — PIPERACILLIN SODIUM,TAZOBACTAM SODIUM 3.38 G: 3; .375 INJECTION, POWDER, FOR SOLUTION INTRAVENOUS at 05:13

## 2017-08-02 RX ADMIN — Medication 0.76 MG: at 21:02

## 2017-08-02 RX ADMIN — CLOPIDOGREL BISULFATE 75 MG: 75 TABLET ORAL at 09:54

## 2017-08-02 RX ADMIN — ONDANSETRON 8 MG: 2 INJECTION INTRAMUSCULAR; INTRAVENOUS at 21:02

## 2017-08-02 RX ADMIN — Medication 0.76 MG: at 13:28

## 2017-08-02 RX ADMIN — PIPERACILLIN SODIUM,TAZOBACTAM SODIUM 3.38 G: 3; .375 INJECTION, POWDER, FOR SOLUTION INTRAVENOUS at 21:15

## 2017-08-02 RX ADMIN — OCTREOTIDE ACETATE 50 MCG: 50 INJECTION, SOLUTION INTRAVENOUS; SUBCUTANEOUS at 21:02

## 2017-08-02 RX ADMIN — SODIUM CHLORIDE 100 MG: 900 INJECTION, SOLUTION INTRAVENOUS at 17:26

## 2017-08-02 RX ADMIN — OCTREOTIDE ACETATE 50 MCG: 50 INJECTION, SOLUTION INTRAVENOUS; SUBCUTANEOUS at 09:54

## 2017-08-02 RX ADMIN — HYDROMORPHONE HYDROCHLORIDE 1 MG: 1 INJECTION, SOLUTION INTRAMUSCULAR; INTRAVENOUS; SUBCUTANEOUS at 17:26

## 2017-08-02 RX ADMIN — Medication 10 ML: at 05:06

## 2017-08-02 RX ADMIN — HYDROMORPHONE HYDROCHLORIDE 1 MG: 1 INJECTION, SOLUTION INTRAMUSCULAR; INTRAVENOUS; SUBCUTANEOUS at 21:02

## 2017-08-02 RX ADMIN — Medication: at 05:13

## 2017-08-02 RX ADMIN — ONDANSETRON 8 MG: 2 INJECTION INTRAMUSCULAR; INTRAVENOUS at 09:54

## 2017-08-02 RX ADMIN — SODIUM CHLORIDE, SODIUM LACTATE, POTASSIUM CHLORIDE, AND CALCIUM CHLORIDE 25 ML/HR: 600; 310; 30; 20 INJECTION, SOLUTION INTRAVENOUS at 13:41

## 2017-08-02 RX ADMIN — HYDROMORPHONE HYDROCHLORIDE 1 MG: 1 INJECTION, SOLUTION INTRAMUSCULAR; INTRAVENOUS; SUBCUTANEOUS at 07:06

## 2017-08-02 RX ADMIN — SODIUM CHLORIDE 40 MG: 9 INJECTION INTRAMUSCULAR; INTRAVENOUS; SUBCUTANEOUS at 09:54

## 2017-08-02 RX ADMIN — LIDOCAINE HYDROCHLORIDE 15 ML: 20 INJECTION, SOLUTION INFILTRATION; PERINEURAL at 15:25

## 2017-08-02 RX ADMIN — PIPERACILLIN SODIUM,TAZOBACTAM SODIUM 3.38 G: 3; .375 INJECTION, POWDER, FOR SOLUTION INTRAVENOUS at 13:28

## 2017-08-02 RX ADMIN — I.V. FAT EMULSION 500 ML: 20 EMULSION INTRAVENOUS at 18:25

## 2017-08-02 RX ADMIN — HYDROMORPHONE HYDROCHLORIDE 1 MG: 1 INJECTION, SOLUTION INTRAMUSCULAR; INTRAVENOUS; SUBCUTANEOUS at 23:35

## 2017-08-02 RX ADMIN — POTASSIUM CHLORIDE: 2 INJECTION, SOLUTION, CONCENTRATE INTRAVENOUS at 18:30

## 2017-08-02 RX ADMIN — HYDROMORPHONE HYDROCHLORIDE 1 MG: 1 INJECTION, SOLUTION INTRAMUSCULAR; INTRAVENOUS; SUBCUTANEOUS at 18:21

## 2017-08-02 RX ADMIN — Medication 0.76 MG: at 05:08

## 2017-08-02 RX ADMIN — Medication: at 13:44

## 2017-08-02 RX ADMIN — ONDANSETRON 8 MG: 2 INJECTION INTRAMUSCULAR; INTRAVENOUS at 16:48

## 2017-08-02 RX ADMIN — Medication: at 20:34

## 2017-08-02 RX ADMIN — NYSTATIN 500000 UNITS: 100000 SUSPENSION ORAL at 17:26

## 2017-08-02 RX ADMIN — HYDROMORPHONE HYDROCHLORIDE 1 MG: 1 INJECTION, SOLUTION INTRAMUSCULAR; INTRAVENOUS; SUBCUTANEOUS at 04:55

## 2017-08-02 RX ADMIN — NYSTATIN 500000 UNITS: 100000 SUSPENSION ORAL at 09:54

## 2017-08-02 NOTE — PROGRESS NOTES
Bedside shift change report given to Oliver Corado RN (oncoming nurse) by Socorro Denson RN (offgoing nurse). Report included the following information SBAR, Kardex and Recent Results.

## 2017-08-02 NOTE — PROGRESS NOTES
Spoke with Dr. Patricia Disla about possibly transferring patient off telemetry today due to high census, but wants patient to stay on [facility] currently for the rest of this week, then may consider next week as patient remains stable.

## 2017-08-02 NOTE — PROGRESS NOTES
Problem: Falls - Risk of  Goal: *Absence of falls  Outcome: Progressing Towards Goal  Patient is without falls at this time. Bed is locked and in lowest position. Call bell and bedside table are within reach. Goal: *Knowledge of fall prevention  Outcome: Progressing Towards Goal  Patient is encouraged to call when assistance is needed. Problem: Pressure Injury - Risk of  Goal: *Prevention of pressure ulcer  Outcome: Progressing Towards Goal  Patient is without skin breakdown at this time.         Problem: Surgical Pathway Post-Op Day 2 through Discharge  Goal: *Optimal pain control at patients stated goal  Outcome: Progressing Towards Goal  Patient's pain is being controlled with the use of PRN pain medications  Goal: *Tolerating diet  Outcome: Progressing Towards Goal  Patient is NPO with sips of clears  Goal: *Demonstrates progressive activity  Outcome: Progressing Towards Goal  Patient is up with assistance

## 2017-08-02 NOTE — PROGRESS NOTES
PICC line placement completed by Dr. Jania Tejada. Right arm PICC line with DDI. Hematoma noted right upper arm above PICC line site. ICE applied and arm elevated.

## 2017-08-02 NOTE — WOUND CARE
WOCN Note:     Follow-up visit for NPWT to abdomen. Chart shows:  Admitted for nausea, vomiting, and abdominal pain; history of Crohn's, DVT, bowel perforation with surgery 6/7/17. Exploratory lap, KATHRINE, fistula exclusion and tube placement by Dr. Gina Lake 7/7/17.     Assessment:   Patient is A&O x4 and mobile around room. Bed: total care bariatric sport  Patient using PureWick. Diet: NPO with sips & TPN  Pre-medicated for pain by RN and using PCA throughout visit.     Mucus fistula LLQ: red & moist and almost at skin level; some mucosal transplantation noted; output is light green mucus. Activelife flat pouch placed.       1. Midline abdominal incision with three drains visible in the base = 17 x 11 x 4 cm.  Ana. Base is 50% moist red mucosal tissue with peristalsis, 10% scattered moist yellow, 40% moist pink with some mucus. Dressed using 1 piece of petroleum gauze as base layer over most of wound bed, 1 white sponge (Rabia drain rests on top of white sponge), 1 black sponge. Rabia drain to wall suction - no track pad placed.   ~800cc green effluent in wall suction cannister - none noted in bedside bag attached to red rubber catheter.      Seen at bedside Dr. Gina Lake. He sutured a small bleed last night which is stable today. No bleeding noted.        Recommendations:    Maintain VAC dressing seal with suction provided by wall suction. Discussed above plan with patient & RN, Nichole Diaz. Transition of Care: Plan to follow as needed while admitted to hospital with next [facility] change on Friday. YOLANDA ElderN, RN, [employer]  Certified Wound, Ostomy, Continence Nurse  office [phone]  pager [phone] or call  to page    Follow-up visit with [facility] seal leaking on left margin in crease and wall suction not pulling drainage. Placed track pad and connected to KCI pump and seal was regained.   Reinforced drape on left margin with stoma paste. Left wall suction pulling as well. This system will hopefully serve as a backup if the Rhododendron drain or the VAC sponge gets clogged, then the other will take over. Maintain VAC as ordered as 125 mmHg continuous suction. Will continue to follow while admitted with next VAc change on Friday.    AMPARO Marino

## 2017-08-02 NOTE — PROGRESS NOTES
TRANSFER - IN REPORT:    Verbal report received from Pat(name) on Domingo Hazard  being received from [facility](unit) for routine progression of care      Report consisted of patients Situation, Background, Assessment and   Recommendations(SBAR). Information from the following report(s) Procedure Summary was reviewed with the receiving nurse. Opportunity for questions and clarification was provided. Assessment completed upon patients arrival to unit and care assumed.

## 2017-08-02 NOTE — PROGRESS NOTES
Problem: Falls - Risk of  Goal: *Absence of falls  Outcome: Progressing Towards Goal  Gripper socks on, bedrails up x3, call bell within reach  Goal: *Knowledge of fall prevention  Outcome: Progressing Towards Goal  Pt. Calls out appropriately     Problem: Surgical Pathway Post-Op Day 2 through Discharge  Goal: Activity/Safety  Outcome: Progressing Towards Goal  Pt. Ambulates to to bathroom and in room tolerating well  Goal: Nutrition/Diet  Outcome: Progressing Towards Goal  Pt is NPO with ice chips and sips of clears  Goal: *Adequate urinary output (equal to or greater than 30 milliliters/hour)  Outcome: Progressing Towards Goal  Pt.  Voiding adequately   Goal: *Hemodynamically stable  Outcome: Progressing Towards Goal  VS stable

## 2017-08-02 NOTE — PROGRESS NOTES
Report called to Baltazar Bowman RN regarding PICC line placement. VS, mental status, post orders and PICC line site and hematoma above PICC insertion site reviewed with report. Transport requested.

## 2017-08-02 NOTE — PROGRESS NOTES
NUTRITION COMPLETE ASSESSMENT    RECOMMENDATIONS:   1. Continue TPN at goal    2. Check triglycerides secondary to long term TPN (no level to evaluate this admission)    3. Continue daily weights (standing scale whenever possible. PT did get her to stand today during evaluation, but weight not yet recorded-- current bed weight is still up 11.5 kg per standing weight on 7/19). 4. Continue to adjust additional IVF prn per drain output    Interventions/Plan:   Food/Nutrient Delivery: TPN as sole source of nutrition    Assessment:   Reason for Assessment:   [x] Reassessment     Diet: NPO   Nutritionally Significant Medications: [x] Reviewed & Includes: Novolin R, Humulin R correction scale; LR at 25 mL/hr; sandostatin 3x/day; zofran q 6 hours prn; protonix daily; zosyn q8 hours; compazine q6 hours prn      TPN: 5%AA D20 @ 85 mL/hr x 1 hour; 172 mL/hr x 13 hours; 85 mL/hr x last hour + MVI, trace elements (3x/week), 14 units insulin/L, thiamine (100 mg) + 20% 500 mL 3x/week    Subjective:  Pt off the floor in radiology for PICC line placement. Objective:  Chart reviewed, discussed with RN and team during interdisciplinary rounds. Pt remains on TPN as sole source of nutrition. She is NPO with ice chips. Wound vac changed today by WOCN. Current TPN is providing a daily average of 2546 kcal, 120 g protein in 2406 mL-- meeting 100% of estimated needs. LR IVF is providing an additional 600 mL/day. Drain output:  8/1: 1050 mL  7/31: 1050 mL  7/30: 2050 mL    Estimated Nutrition Needs:   Kcals/day: 2500 Kcals/day (6518-2327 kcal/day ([address] Jeor x 1.2-1.3))  Protein: 110 g (110-136 g/day (2-2.5 g/kg IBW))  Fluid:  (1  ml/kcal)     Based On: [address] Jeor  Weight Used: Actual wt (142.7 kg standing scale on 7/19)    Pt expected to meet estimated nutrient needs:  [x]   Yes     Nutrition Diagnosis:   1.  Inadequate oral food/beverage intake related to unresectable ischemic bowel  as evidenced by NPO with TPN as sole source of nutrition    Goals:     TPN to meet at least 90% of estimated needs over the next 5-7 days     Monitoring & Evaluation:    - Enteral/parenteral nutrition intake   - Electrolyte and renal profile, Weight/weight change, GI profile     Previous Nutrition Goals Met:  Yes  Previous Recommendations:      Yes    Education & Discharge Needs:   [x] None Identified   [] Identified and addressed    [x] Participated in care plan, discharge planning, and/or interdisciplinary rounds        Cultural, Lutheran and ethnic food preferences identified:   None    Skin Integrity: []Intact  [x]Other: see wound orders  Edema: []None [x]Other: 1+  Last BM: 7/22/17-- output  Food Allergies: [x]None []Other    Anthropometrics:    Weight Loss Metrics 8/2/2017 6/25/2017 6/25/2017 6/25/2017 6/22/2017 6/12/2017 6/6/2017   Today's Wt 339 lb 15.2 oz - 343 lb - 343 lb 343 lb 14.7 oz -   BMI - 58.35 kg/m2 - 58.88 kg/m2 58.88 kg/m2 - 59.03 kg/m2      Last 3 Recorded Weights in this Encounter    07/31/17 0533 08/01/17 0314 08/02/17 0513   Weight: 153 kg (337 lb 4.9 oz) 153.8 kg (339 lb 1.1 oz) 154.2 kg (339 lb 15.2 oz)      Weight Source: Bed  Height: 5' 4\" (162.6 cm),    Body mass index is 58.35 kg/(m^2).   IBW : 54.4 kg (120 lb), % IBW (Calculated): 263.45 %   ,      Labs:    Lab Results   Component Value Date/Time    Sodium 143 08/02/2017 05:04 AM    Potassium 4.1 08/02/2017 05:04 AM    Chloride 112 08/02/2017 05:04 AM    CO2 25 08/02/2017 05:04 AM    Glucose 103 08/02/2017 05:04 AM    BUN 24 08/02/2017 05:04 AM    Creatinine 0.79 08/02/2017 05:04 AM    Calcium 8.7 08/02/2017 05:04 AM    Magnesium 1.9 08/02/2017 05:04 AM    Phosphorus 4.4 08/02/2017 05:04 AM    Albumin 1.5 07/08/2017 04:42 AM     No results found for: HBA1C, HGBE8, NER9YFUF, HMW8PUTF  Lab Results   Component Value Date/Time    Glucose 103 08/02/2017 05:04 AM    Glucose (POC) 125 08/02/2017 12:35 PM      Lab Results   Component Value Date/Time    ALT (SGPT) 13 07/08/2017 04:42 AM    AST (SGOT) 11 07/08/2017 04:42 AM    Alk.  phosphatase 120 07/08/2017 04:42 AM    Bilirubin, direct 0.1 07/07/2009 06:38 PM    Bilirubin, total 0.6 07/08/2017 04:42 AM      [address] [place] [address]

## 2017-08-02 NOTE — PROGRESS NOTES
Came into pt. Room and wound vac was leaking around the bottom left. MAURI Herring Getting cut wet piece off and reinforce wound vac dressing with a Tegaderm.

## 2017-08-02 NOTE — PROGRESS NOTES
Palliative Medicine Consult  [facility]: [phone]    Patient Name: José Miguel Herr  YOB: 1975    Date of Initial Consult: 6/27/17  Reason for Consult: overwhelming symptoms  Requesting Provider: Win Pearce NP  Primary Care Physician: Jesus Gao MD      SUMMARY:   José Miguel Herr is a 46 y.o. with a past history of Crohn's disease, anxiety, chronic pain, hx DVT,multiple (4) small bowel resections, s/p recent urgent dx lap, with lysis of adhesions, repair of suspected perf 6/7/17 , who was admitted on 6/25/2017 from home with a diagnosis of worsening abdominal pain, elevated WBC and MRSA wound infection. Initially consulted for pain management thought to be possible Crohn's flare. Initial CT on 6/25 w/out acute findings but repeat on 6/28 showing ischemic bowl. S/p ex lap but ischemic bowl unresectable, s/p SMA stent. Extubated on 6/30. Most recently s/p ex lap, KATHRINE, feeding tube, fistula exclusion and wound vac placement for EC fistula. Remains on TPN, NPO w/ sips. Patient is followed chronically for pain management by Dr. Anabella Zaragoza at [address] [place] [address]. Home regimen for chronic abdominal pain is MS Contin 60 Q8 and MS IR 30 Q6 PRN. She also takes Xanax 1mg TID prn anxiety.  reviewed, opioids last prescribed on 5/19/17. PALLIATIVE DIAGNOSES:     1. Abdominal pain, generalized  2. Anxiety  3. Nausea / vomiting  4. Debility   5. Crohn's disease  6. Chronic constipation (at home on [address])  7. MRSA wound        PLAN:   1. Pt getting wound care, having some increased discomfort from that and in recent days from more wound care; she denies excessive sedation from PCA basal and appears awake / altert; using 1-2 doses / hr for last few hours; has had multiple PRN doses dilaudid in recent days with increased wound care  2. Continue Morphine PCA 4mg/h basal, 5mg every 10 min demand. Pt has indicated that Morphine works better than Dilaudid (at equivalent doses). 3. Cont prn Dilaudid 1mg IV every 1h prn pain that is not controlled by PCA- hortencia before / during wound care  4. For nausea, continue PRN zofran, compazine, close assessment  5. Continue low dose scheduled ativan, pt tolerating without excessive sedation; would need slow wean if intending to stop  6. Will cont to follow for PCA management, although if stable may not see daily- please reach out for any concerns. Palliative  following for support. 7. Discussed with pt that as time nears for discharge, will need to transition off pca  8. If pt leaves hospital on TPN, will discuss transition to scheduled liquid meds versus trying the MSContin (concern that may cause nausea). Will speak to surgery team when this time comes. 9. Bowel regimen per surgical team.  10. Communicated plan of care with: Palliative IDT     GOALS OF CARE / TREATMENT PREFERENCES:   [====Goals of Care====]  GOALS OF CARE:  Patient / health care proxy stated goals: pain control  Recovery from acute issues      TREATMENT PREFERENCES:   Code Status: Full Code    Advance Care Planning:  Advance Care Planning 6/26/2017   Patient's Healthcare Decision Maker is: Legal Next of Camilo 71   Primary Decision Maker Name Gustavo Johnson   Primary Decision Maker Phone Number [phone]   Primary Decision Maker Relationship to Patient Parent   Confirm Advance Directive None   Patient Would Like to Complete Advance Directive No       Other:    The palliative care team has discussed with patient / health care proxy about goals of care / treatment preferences for patient.  [====Goals of Care====]         HISTORY:     Reviewed vitals, I/O's, labs, imaging, MAR, notes.   Tylenol, no recent dose  4-6 doses PRN dilaudid over last couple of days  Ativan 0.76mg IV TID scheduled  Morphine pca 4mg/hr, 5mg q10min  2 doses zofran, 1 dose compazine yesterday  TPN, 0 PO    HPI/SUBJECTIVE:    The patient is:   [x] Verbal and participatory  [] Non-participatory due to: Medical condition     Pt reports abd pain 6 / 10, had an episode of vomiting earlier today, pain went up with that, had a dose of PRN iv dilaudid. Now pain tolerable again, no nausea, sob. Clinical Pain Assessment (nonverbal scale for severity on nonverbal patients):   [++++ Clinical Pain Assessment++++]  [++++Pain Severity++++]: Pain: 7  [++++Pain Character++++]: sharp and burning, stabbing  [++++Pain Duration++++]: several days  [++++Pain Effect++++]:  feeling anxious  [++++Pain Factors++++]: better after pain medicaion  [++++Pain Frequency++++]: constant  [++++Pain Location++++]: across abdomen both sides in middle at incision site  [++++ Clinical Pain Assessment++++]     FUNCTIONAL ASSESSMENT:     Palliative Performance Scale (PPS):  PPS: 40       PSYCHOSOCIAL/SPIRITUAL SCREENING:     Advance Care Planning:  Advance Care Planning 6/26/2017   Patient's Healthcare Decision Maker is: Legal Next of Camilo 71   Primary Decision Maker Name Eduardo Marcano   Primary Decision Maker Phone Number [phone]   Primary Decision Maker Relationship to Patient Parent   Confirm Advance Directive None   Patient Would Like to Complete Advance Directive No        Any spiritual / Mandaeism concerns:  [] Yes /  [x] No    Caregiver Burnout:  [] Yes /  [x] No /  [] No Caregiver Present      Anticipatory grief assessment:   [x] Normal  / [] Maladaptive       ESAS Anxiety: Anxiety: 3    ESAS Depression: Depression: 2        REVIEW OF SYSTEMS:     Positive and pertinent negative findings in ROS are noted above in HPI. The following systems were [x] reviewed / [] unable to be reviewed as noted in HPI  Other findings are noted below. Systems: constitutional, ears/nose/mouth/throat, respiratory, gastrointestinal, genitourinary, musculoskeletal, integumentary, neurologic, psychiatric, endocrine. Positive findings noted below.   Modified ESAS Completed by: provider   Fatigue: 4 Drowsiness: 0   Depression: 2 Pain: 7   Anxiety: 3 Nausea: 5 Anorexia: 0 Dyspnea: 0     Constipation: No              PHYSICAL EXAM:     From RN flowsheet:  Wt Readings from Last 3 Encounters:   08/02/17 339 lb 15.2 oz (154.2 kg)   06/25/17 343 lb (155.6 kg)   06/22/17 343 lb (155.6 kg)     Blood pressure 111/50, pulse 99, temperature 98.3 °F (36.8 °C), resp. rate 18, height 5' 4\" (1.626 m), weight 339 lb 15.2 oz (154.2 kg), SpO2 99 %, not currently breastfeeding. Pain Scale 1: Numeric (0 - 10)  Pain Intensity 1: 7  Pain Onset 1: chronic  Pain Location 1: Abdomen  Pain Orientation 1: Anterior  Pain Description 1: Aching  Pain Intervention(s) 1: Medication (see MAR)  Last bowel movement, if known: stool in ostomy     Constitutional:awake, alert, nad  Eyes: pupils equal, anicteric  ENMT: no nasal discharge, dry mucous membranes  Cardiac: regular rhythm   Respiratory: breathing not labored  Gastrointestinal: non-distended, midline open abd wound, hypoactive bowel sounds   Musculoskeletal: no deformity, no tenderness to palpation  Skin: warm, dry, no edema  Neurologic: moving all extremities, follows commands  Psych: no agitation, restricted affect, no hallucinations     HISTORY:     Active Problems:    Abdominal pain (6/25/2017)      Small bowel ischemia (HCC) (6/28/2017)      Overview: CT abd/pelvis 6/28/17      1. The stomach and proximal small bowel loops are distended. There is a       loop of      small bowel in the midabdomen which is mildly dilated and does not       demonstrate      enhancement in the wall and there is suspicion of pneumatosis and this       leads to      a loop of small bowel which demonstrates thickening of the wall and       findings are      suspicious for ischemia. 2. There is extensive mesenteric edema and a small amount of ascites in       the      pelvis. 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 There is nonocclusive thrombus in the SMA.             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)      Past Medical History:   Diagnosis Date    Crohn's disease ([facility] 8/15/2011    DVT (deep venous thrombosis) ([facility] 8/15/2011    Incarcerated ventral hernia 8/15/2011    Right flank pain 8/22/2011    [facility]     Stroke [facility])       Past Surgical History:   Procedure Laterality Date    HX OTHER SURGICAL      multiple procedures related to her Crohn's disease    CT LAP, INCISIONAL HERNIA REPAIR,INCARCERATED  9-10-08    dr. Ulices Dutta      Family History   Problem Relation Age of Onset    Hypertension Father     Stroke Father     Other Father      arthritis      History reviewed, no pertinent family history.   Social History   Substance Use Topics    Smoking status: Former Smoker    Smokeless tobacco: Not on file    Alcohol use No     Allergies   Allergen Reactions    Demerol [Meperidine] Unknown (comments)    Soma [Carisoprodol] Rash and Nausea Only    Sulfa (Sulfonamide Antibiotics) Unknown (comments)    Toradol [Ketorolac Tromethamine] Unknown (comments)      Current Facility-Administered Medications   Medication Dose Route Frequency    TPN ADULT - CENTRAL   IntraVENous TITRATE    nystatin (MYCOSTATIN) 100,000 unit/mL oral suspension 500,000 Units  500,000 Units Oral QID    0.9% sodium chloride infusion 250 mL  250 mL IntraVENous PRN    acetaminophen (TYLENOL) tablet 650 mg  650 mg Oral Q4H PRN    LORazepam (ATIVAN) injection 0.76 mg  0.76 mg IntraVENous Q8H    albuterol (PROVENTIL VENTOLIN) nebulizer solution 2.5 mg  2.5 mg Inhalation Q4H PRN    octreotide (SANDOSTATIN) injection 50 mcg  50 mcg IntraVENous TID    clopidogrel (PLAVIX) tablet 75 mg  75 mg Oral DAILY    sodium chloride (NS) flush 10 mL  10 mL InterCATHeter Q24H    sodium chloride (NS) flush 10 mL  10 mL InterCATHeter PRN    sodium chloride (NS) flush 10-40 mL  10-40 mL InterCATHeter Q8H    alteplase (CATHFLO) 1 mg in sterile water (preservative free) 1 mL injection  1 mg InterCATHeter PRN    bacitracin 500 unit/gram packet 1 Packet  1 Packet Topical PRN    0.9% sodium chloride infusion 250 mL  250 mL IntraVENous PRN    lactated Ringers infusion  25 mL/hr IntraVENous CONTINUOUS    fat emulsion 20% (LIPOSYN, INTRALIPID) infusion 500 mL  500 mL IntraVENous Q MON, WED & FRI    HYDROmorphone (PF) (DILAUDID) injection 1 mg  1 mg IntraVENous Q1H PRN    aspirin chewable tablet 81 mg  81 mg Oral DAILY    insulin regular (NOVOLIN R, HUMULIN R) injection   SubCUTAneous Q6H    morphine (PF)  mg/30 ml   IntraVENous CONTINUOUS    glucose chewable tablet 16 g  4 Tab Oral PRN    glucagon (GLUCAGEN) injection 1 mg  1 mg IntraMUSCular PRN    dextrose 10 % infusion 125-250 mL  125-250 mL IntraVENous PRN    pantoprazole (PROTONIX) 40 mg in sodium chloride 0.9 % 10 mL injection  40 mg IntraVENous DAILY    prochlorperazine (COMPAZINE) with saline injection 5 mg  5 mg IntraVENous Q6H PRN    anidulafungin (ERAXIS) 100 mg in 0.9% sodium chloride 130 mL IVPB  100 mg IntraVENous Q24H    ondansetron (ZOFRAN) injection 8 mg  8 mg IntraVENous Q6H PRN    sodium chloride (NS) flush 5-10 mL  5-10 mL IntraVENous Q8H    sodium chloride (NS) flush 5-10 mL  5-10 mL IntraVENous PRN    naloxone (NARCAN) injection 0.4 mg  0.4 mg IntraVENous PRN    diphenhydrAMINE (BENADRYL) injection 12.5 mg  12.5 mg IntraVENous Q4H PRN    piperacillin-tazobactam (ZOSYN) 3.375 g in 0.9% sodium chloride (MBP/ADV) 100 mL  3.375 g IntraVENous Q8H          LAB AND IMAGING FINDINGS:     Lab Results   Component Value Date/Time    WBC 9.6 08/02/2017 05:04 AM    HGB 7.6 08/02/2017 05:04 AM    PLATELET 448 70/96/0619 05:04 AM     Lab Results   Component Value Date/Time    Sodium 143 08/02/2017 05:04 AM    Potassium 4.1 08/02/2017 05:04 AM    Chloride 112 08/02/2017 05:04 AM    CO2 25 08/02/2017 05:04 AM    BUN 24 08/02/2017 05:04 AM    Creatinine 0.79 08/02/2017 05:04 AM    Calcium 8.7 08/02/2017 05:04 AM    Magnesium 1.9 08/02/2017 05:04 AM    Phosphorus 4.4 08/02/2017 05:04 AM      Lab Results   Component Value Date/Time    AST (SGOT) 11 07/08/2017 04:42 AM    Alk. phosphatase 120 07/08/2017 04:42 AM    Protein, total 7.0 07/08/2017 04:42 AM    Albumin 1.5 07/08/2017 04:42 AM    Globulin 5.5 07/08/2017 04:42 AM     No results found for: INR, PTMR, PTP, PT1, PT2, APTT   No results found for: IRON, FE, TIBC, IBCT, PSAT, FERR   No results found for: PH, PCO2, PO2  No components found for: GLPOC   No results found for: CPK, CKMB             Total time:    Counseling / coordination time, spent as noted above:    > 50% counseling / coordination?:     Prolonged service was provided for  []30 min   []75 min in face to face time in the presence of the patient, spent as noted above. Time Start:   Time End:   Note: this can only be billed with 96397 (initial) or 79438 (follow up). If multiple start / stop times, list each separately.

## 2017-08-02 NOTE — PROGRESS NOTES
Rcvd in angio prior to PICC line placement. Dr. Paco Garcia at bedside and expalined procedure to patient. Verbalizes understanding of same. Assessment as noted. PCA in use for pain control (abdominal ). MD aware patient on Plavix.

## 2017-08-02 NOTE — PROGRESS NOTES
Problem: Mobility Impaired (Adult and Pediatric)  Goal: *Acute Goals and Plan of Care (Insert Text)  Physical Therapy Goals  Revised 7/28/2017  1. Patient will transfer from bed to chair and chair to bed with modified independence using the least restrictive device within 7 day(s). 2. Patient will perform sit to stand with modified independence within 7 day(s). 3. Patient will ambulate with modified independence for 250 feet with the least restrictive device within 7 day(s). 4. Patient will progress to ambulating in the hallway within 7 days. 5. Patient will ascend and descend 4 steps within 7 days. Physical Therapy Goals  Revised 7/20/2017  1. Patient will move from supine to sit and sit to supine , scoot up and down and roll side to side in bed with independence within 7 day(s). 2. Patient will transfer from bed to chair and chair to bed with supervision/set-up using the least restrictive device within 7 day(s). 3. Patient will perform sit to stand with supervision/set-up within 7 day(s). 4. Patient will ambulate with supervision/set-up for 200 feet with the least restrictive device within 7 day(s). 5. Patient will ascend/descend 7 stairs with one handrail(s) with supervision/set-up within 7 day(s). Physical Therapy Goals  7/12/2017  1. Patient will move from supine to sit and sit to supine , scoot up and down and roll side to side in bed with independence within 7 day(s). 2. Patient will transfer from bed to chair and chair to bed with supervision/set-up using the least restrictive device within 7 day(s). 3. Patient will perform sit to stand with supervision/set-up within 7 day(s). 4. Patient will ambulate with supervision/set-up for 50 feet with the least restrictive device within 7 day(s). PHYSICAL THERAPY TREATMENT  Patient:  José Miguel Herr (69 y.o. female)  Date: 8/2/2017  Diagnosis: N/V intractable post-opt pain  Abdominal pain  poor iv access  DEAD BOWEL  SMALL BOWEL OBSTRUCTION  central line placement  Abdominal pain <principal problem not specified>  Procedure(s) (LRB):  SPECIAL PROCEDURE OUTSIDE OF OR (N/A) 20 Days Post-Op  Precautions:        ASSESSMENT:  Pt cleared by nsg. Pt needing additional time but basically mod I with bed mobility. Pt able to stand with CGA. Pt has multiple lines/leads/drains and one drain was on suction (needed to remain on suction) so unable to walk as far today. Pt able to complete approx 3 laps to door and back. Performed standing balance exercises including marching in place. Once seated in recliner, instructed in and performed ankle pumps. Instructed in quad sets/short arc quad sets and pt performed well. Pt mildly SOB with standing marching in place, but cued for deep/pursed lip breathig. Using PCA @ times for pain control. Pt needing encouragement but doing well. Limiting factors are pain/endurance and drains. May progress well though and require only HHPT. Will follow  Progression toward goals:  [X]    Improving appropriately and progressing toward goals  [ ]    Improving slowly and progressing toward goals  [ ]    Not making progress toward goals and plan of care will be adjusted       PLAN:  Patient continues to benefit from skilled intervention to address the above impairments. Continue treatment per established plan of care. Discharge Recommendations:  Home Health  Further Equipment Recommendations for Discharge:  tbd but likely none       SUBJECTIVE:   Patient stated That exercise makes my thighs hurt.   Referring to standing hip flexion. Likely due to weakness/tender skin around drains.     OBJECTIVE DATA SUMMARY:   Critical Behavior:  Neurologic State: Alert  Orientation Level: Oriented X4  Cognition: Appropriate decision making, Appropriate for age attention/concentration, Appropriate safety awareness, Follows commands     Functional Mobility Training:  Bed Mobility:  Rolling: Modified independent  Supine to Sit: Modified independent      Transfers:  Sit to Stand: Contact guard assistance  Stand to Sit: Contact guard assistance         Balance:  Sitting: Intact  Standing: Impaired  Standing - Static: Good  Standing - Dynamic : Good  Ambulation/Gait Training:  Distance (ft): 25 Feet (ft)  Assistive Device:  (CGA/IV pole)  Ambulation - Level of Assistance: Stand-by asssistance;Contact guard assistance (Assist with lines/leads/IV pole/drains)        Gait Abnormalities: Antalgic;Decreased step clearance (lateral trunk sway)        Base of Support: Widened     Speed/Liz: Other (comment)  Step Length: Right shortened;Left shortened            Therapeutic Exercises: Ankle pumps, quad sets and standing hip/knee flexion  Pain:  Pain Scale 1: Numeric (0 - 10)  Pain Intensity 1: 7  Pain Location 1: Abdomen  Pain Orientation 1: Anterior  Pain Description 1: Aching  Pain Intervention(s) 1: Medication (see MAR)  Activity Tolerance:   good  Please refer to the flowsheet for vital signs taken during this treatment.   After treatment:   [X]    Patient left in no apparent distress sitting up in chair  [ ]    Patient left in no apparent distress in bed  [X]    Call bell left within reach  [X]    Nursing notified  [X]    Caregiver present  [ ]    Bed alarm activated      COMMUNICATION/COLLABORATION:   The patients plan of care was discussed with: Registered Nurse     Emery Copeland, PT   Time Calculation: 38 mins

## 2017-08-02 NOTE — PROGRESS NOTES
Bedside and Verbal shift change report given to Shanda Ha RN (oncoming nurse) by Shruthi Logan RN (offgoing nurse). Report included the following information SBAR, Kardex, Procedure Summary, Intake/Output, MAR, Recent Results and Cardiac Rhythm NSR.

## 2017-08-02 NOTE — PROGRESS NOTES
Received a PICC order and PICC team was unable to place PICC in either arm. Roscoe Cruz NP advised and order placed for patient to have IR place central line. [facility] advised that anesthesia has placed several IJ's for patient and they were proximal and that we were unsuccessful in placing PICC in either arm. Patient needs TPN. Patients nurse Jono Abdi RN advised of plan

## 2017-08-03 LAB
ANION GAP BLD CALC-SCNC: 6 MMOL/L (ref 5–15)
BASOPHILS # BLD AUTO: 0 K/UL (ref 0–0.1)
BASOPHILS # BLD: 0 % (ref 0–1)
BUN SERPL-MCNC: 23 MG/DL (ref 6–20)
BUN/CREAT SERPL: 27 (ref 12–20)
CALCIUM SERPL-MCNC: 8.4 MG/DL (ref 8.5–10.1)
CHLORIDE SERPL-SCNC: 106 MMOL/L (ref 97–108)
CO2 SERPL-SCNC: 25 MMOL/L (ref 21–32)
CREAT SERPL-MCNC: 0.86 MG/DL (ref 0.55–1.02)
EOSINOPHIL # BLD: 0.5 K/UL (ref 0–0.4)
EOSINOPHIL NFR BLD: 5 % (ref 0–7)
ERYTHROCYTE [DISTWIDTH] IN BLOOD BY AUTOMATED COUNT: 17 % (ref 11.5–14.5)
GLUCOSE BLD STRIP.AUTO-MCNC: 106 MG/DL (ref 65–100)
GLUCOSE BLD STRIP.AUTO-MCNC: 109 MG/DL (ref 65–100)
GLUCOSE BLD STRIP.AUTO-MCNC: 110 MG/DL (ref 65–100)
GLUCOSE SERPL-MCNC: 113 MG/DL (ref 65–100)
HCT VFR BLD AUTO: 22.8 % (ref 35–47)
HGB BLD-MCNC: 7.1 G/DL (ref 11.5–16)
LYMPHOCYTES # BLD AUTO: 31 % (ref 12–49)
LYMPHOCYTES # BLD: 3.2 K/UL (ref 0.8–3.5)
MAGNESIUM SERPL-MCNC: 1.9 MG/DL (ref 1.6–2.4)
MCH RBC QN AUTO: 28.5 PG (ref 26–34)
MCHC RBC AUTO-ENTMCNC: 31.1 G/DL (ref 30–36.5)
MCV RBC AUTO: 91.6 FL (ref 80–99)
MONOCYTES # BLD: 0.9 K/UL (ref 0–1)
MONOCYTES NFR BLD AUTO: 9 % (ref 5–13)
NEUTS SEG # BLD: 5.6 K/UL (ref 1.8–8)
NEUTS SEG NFR BLD AUTO: 55 % (ref 32–75)
PHOSPHATE SERPL-MCNC: 4.2 MG/DL (ref 2.6–4.7)
PLATELET # BLD AUTO: 313 K/UL (ref 150–400)
POTASSIUM SERPL-SCNC: 4 MMOL/L (ref 3.5–5.1)
RBC # BLD AUTO: 2.49 M/UL (ref 3.8–5.2)
SERVICE CMNT-IMP: ABNORMAL
SODIUM SERPL-SCNC: 137 MMOL/L (ref 136–145)
WBC # BLD AUTO: 10.2 K/UL (ref 3.6–11)

## 2017-08-03 PROCEDURE — 36415 COLL VENOUS BLD VENIPUNCTURE: CPT | Performed by: SURGERY

## 2017-08-03 PROCEDURE — 74011000258 HC RX REV CODE- 258: Performed by: SURGERY

## 2017-08-03 PROCEDURE — 65660000000 HC RM CCU STEPDOWN

## 2017-08-03 PROCEDURE — 77030018717 HC DRSG GRNUFM KCON -B

## 2017-08-03 PROCEDURE — 3331090002 HH PPS REVENUE DEBIT

## 2017-08-03 PROCEDURE — 74011250636 HC RX REV CODE- 250/636: Performed by: INTERNAL MEDICINE

## 2017-08-03 PROCEDURE — 74011250637 HC RX REV CODE- 250/637: Performed by: SURGERY

## 2017-08-03 PROCEDURE — 77030019952 HC CANSTR VAC ASST KCON -B

## 2017-08-03 PROCEDURE — 3331090001 HH PPS REVENUE CREDIT

## 2017-08-03 PROCEDURE — 74011250636 HC RX REV CODE- 250/636: Performed by: NURSE PRACTITIONER

## 2017-08-03 PROCEDURE — 80048 BASIC METABOLIC PNL TOTAL CA: CPT | Performed by: SURGERY

## 2017-08-03 PROCEDURE — 74011250636 HC RX REV CODE- 250/636: Performed by: SURGERY

## 2017-08-03 PROCEDURE — 74011000258 HC RX REV CODE- 258: Performed by: NURSE PRACTITIONER

## 2017-08-03 PROCEDURE — 77030018836 HC SOL IRR NACL ICUM -A

## 2017-08-03 PROCEDURE — 74011636637 HC RX REV CODE- 636/637: Performed by: NURSE PRACTITIONER

## 2017-08-03 PROCEDURE — 82962 GLUCOSE BLOOD TEST: CPT

## 2017-08-03 PROCEDURE — 83735 ASSAY OF MAGNESIUM: CPT | Performed by: SURGERY

## 2017-08-03 PROCEDURE — C9113 INJ PANTOPRAZOLE SODIUM, VIA: HCPCS | Performed by: SURGERY

## 2017-08-03 PROCEDURE — 74011000258 HC RX REV CODE- 258: Performed by: INTERNAL MEDICINE

## 2017-08-03 PROCEDURE — 74011250637 HC RX REV CODE- 250/637: Performed by: PHYSICIAN ASSISTANT

## 2017-08-03 PROCEDURE — 97606 NEG PRS WND THER DME>50 SQCM: CPT

## 2017-08-03 PROCEDURE — 74011000250 HC RX REV CODE- 250: Performed by: NURSE PRACTITIONER

## 2017-08-03 PROCEDURE — 84100 ASSAY OF PHOSPHORUS: CPT | Performed by: SURGERY

## 2017-08-03 PROCEDURE — 74011250636 HC RX REV CODE- 250/636: Performed by: EMERGENCY MEDICINE

## 2017-08-03 PROCEDURE — 74011000250 HC RX REV CODE- 250: Performed by: SURGERY

## 2017-08-03 PROCEDURE — 74011250636 HC RX REV CODE- 250/636: Performed by: PHYSICAL MEDICINE & REHABILITATION

## 2017-08-03 PROCEDURE — 85025 COMPLETE CBC W/AUTO DIFF WBC: CPT | Performed by: SURGERY

## 2017-08-03 RX ADMIN — Medication: at 17:57

## 2017-08-03 RX ADMIN — Medication 10 ML: at 06:11

## 2017-08-03 RX ADMIN — Medication 10 ML: at 22:21

## 2017-08-03 RX ADMIN — OCTREOTIDE ACETATE 50 MCG: 50 INJECTION, SOLUTION INTRAVENOUS; SUBCUTANEOUS at 09:36

## 2017-08-03 RX ADMIN — Medication 10 ML: at 13:46

## 2017-08-03 RX ADMIN — NYSTATIN 500000 UNITS: 100000 SUSPENSION ORAL at 17:57

## 2017-08-03 RX ADMIN — NYSTATIN 500000 UNITS: 100000 SUSPENSION ORAL at 22:21

## 2017-08-03 RX ADMIN — PIPERACILLIN SODIUM,TAZOBACTAM SODIUM 3.38 G: 3; .375 INJECTION, POWDER, FOR SOLUTION INTRAVENOUS at 22:20

## 2017-08-03 RX ADMIN — HYDROMORPHONE HYDROCHLORIDE 1 MG: 1 INJECTION, SOLUTION INTRAMUSCULAR; INTRAVENOUS; SUBCUTANEOUS at 14:40

## 2017-08-03 RX ADMIN — Medication: at 09:33

## 2017-08-03 RX ADMIN — Medication 10 ML: at 09:36

## 2017-08-03 RX ADMIN — NYSTATIN 500000 UNITS: 100000 SUSPENSION ORAL at 09:23

## 2017-08-03 RX ADMIN — PIPERACILLIN SODIUM,TAZOBACTAM SODIUM 3.38 G: 3; .375 INJECTION, POWDER, FOR SOLUTION INTRAVENOUS at 13:41

## 2017-08-03 RX ADMIN — SODIUM CHLORIDE 40 MG: 9 INJECTION INTRAMUSCULAR; INTRAVENOUS; SUBCUTANEOUS at 09:21

## 2017-08-03 RX ADMIN — CLOPIDOGREL BISULFATE 75 MG: 75 TABLET ORAL at 09:24

## 2017-08-03 RX ADMIN — Medication 0.76 MG: at 13:40

## 2017-08-03 RX ADMIN — NYSTATIN 500000 UNITS: 100000 SUSPENSION ORAL at 13:40

## 2017-08-03 RX ADMIN — Medication 0.76 MG: at 06:11

## 2017-08-03 RX ADMIN — ONDANSETRON 8 MG: 2 INJECTION INTRAMUSCULAR; INTRAVENOUS at 09:36

## 2017-08-03 RX ADMIN — POTASSIUM CHLORIDE: 2 INJECTION, SOLUTION, CONCENTRATE INTRAVENOUS at 18:02

## 2017-08-03 RX ADMIN — ASPIRIN 81 MG CHEWABLE TABLET 81 MG: 81 TABLET CHEWABLE at 09:24

## 2017-08-03 RX ADMIN — OCTREOTIDE ACETATE 50 MCG: 50 INJECTION, SOLUTION INTRAVENOUS; SUBCUTANEOUS at 15:24

## 2017-08-03 RX ADMIN — HYDROMORPHONE HYDROCHLORIDE 1 MG: 1 INJECTION, SOLUTION INTRAMUSCULAR; INTRAVENOUS; SUBCUTANEOUS at 02:37

## 2017-08-03 RX ADMIN — SODIUM CHLORIDE 100 MG: 900 INJECTION, SOLUTION INTRAVENOUS at 18:07

## 2017-08-03 RX ADMIN — Medication 0.76 MG: at 22:21

## 2017-08-03 RX ADMIN — HYDROMORPHONE HYDROCHLORIDE 1 MG: 1 INJECTION, SOLUTION INTRAMUSCULAR; INTRAVENOUS; SUBCUTANEOUS at 01:30

## 2017-08-03 RX ADMIN — ONDANSETRON 8 MG: 2 INJECTION INTRAMUSCULAR; INTRAVENOUS at 15:24

## 2017-08-03 RX ADMIN — HYDROMORPHONE HYDROCHLORIDE 1 MG: 1 INJECTION, SOLUTION INTRAMUSCULAR; INTRAVENOUS; SUBCUTANEOUS at 22:20

## 2017-08-03 RX ADMIN — PIPERACILLIN SODIUM,TAZOBACTAM SODIUM 3.38 G: 3; .375 INJECTION, POWDER, FOR SOLUTION INTRAVENOUS at 06:11

## 2017-08-03 RX ADMIN — Medication 20 ML: at 18:07

## 2017-08-03 RX ADMIN — Medication 20 ML: at 09:21

## 2017-08-03 NOTE — PROGRESS NOTES
Daily Progress Note  [facility] General Surgery at [address] Date: 2017  Post-Op:   17: Ex laparotomy with Soledad, fistula exclusion with feeding tube placement, frozen abdomen, wound vac assisted closure placement. - Rajni  17: Ex laparotomy with extensive Soledad, repair of enterotomy, SMA stent on the left. - Soumya/Dung/Denny  Subjective:     Last 24 hrs: Doing OK today, some issues with wounds leaking overnight. Objective:     Blood pressure 110/59, pulse 99, temperature 97.9 °F (36.6 °C), resp. rate 16, height 5' 4\" (1.626 m), weight 156.5 kg (345 lb 0.3 oz), SpO2 99 %, not currently breastfeeding. Temp (24hrs), Av.3 °F (36.8 °C), Min:97.9 °F (36.6 °C), Max:98.7 °F (37.1 °C)      _____________________  Physical Exam:     Alert and Oriented, lying in bed, in no acute distress. Cardiovascular: RRR,   Lungs:CTAB   Abdomen: soft, NT. Wound vac in place, bilious output. Rabia drain on suction. Red rubber in LUQ with scant enteric drainage on dressing. Ostomy with scant output. Assessment:   Active Problems:    Abdominal pain (2017)      Small bowel ischemia ([address] (2017)      Overview: CT abd/pelvis 17      1. The stomach and proximal small bowel loops are distended. There is a       loop of      small bowel in the midabdomen which is mildly dilated and does not       demonstrate      enhancement in the wall and there is suspicion of pneumatosis and this       leads to      a loop of small bowel which demonstrates thickening of the wall and       findings are      suspicious for ischemia. 2. There is extensive mesenteric edema and a small amount of ascites in       the      pelvis. 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 There is nonocclusive thrombus in the SMA.             Enterocutaneous fistula (2017)            Plan:     Continue wound care  Renew TPN  NPO with sips  Abx per ID  OOB as she is doing  Plavix for anticoagulation      Telly Jimenez, ACNP - [address] [facility] at [facility],  CARLO Zhou, [address] [place], [place]  [phone]    Data Review:    Recent Labs      08/03/17   9132  08/02/17   0504  08/01/17   0632   WBC  10.2  9.6  10.5   HGB  7.1*  7.6*  8.6*   HCT  22.8*  24.6*  27.8*   PLT  313  336  385     Recent Labs      08/03/17   0247  08/02/17   0504  08/01/17   0632   NA  137  143  141   K  4.0  4.1  4.1   CL  106  112*  111*   CO2  25  25  23   GLU  113*  103*  97   BUN  23*  24*  21*   CREA  0.86  0.79  0.85   CA  8.4*  8.7  8.7   MG  1.9  1.9  1.8   PHOS  4.2  4.4  3.9     No results for input(s): AML, LPSE in the last 72 hours.         ______________________  Medications:    Current Facility-Administered Medications   Medication Dose Route Frequency    TPN ADULT - CENTRAL   IntraVENous TITRATE    nystatin (MYCOSTATIN) 100,000 unit/mL oral suspension 500,000 Units  500,000 Units Oral QID    0.9% sodium chloride infusion 250 mL  250 mL IntraVENous PRN    acetaminophen (TYLENOL) tablet 650 mg  650 mg Oral Q4H PRN    LORazepam (ATIVAN) injection 0.76 mg  0.76 mg IntraVENous Q8H    albuterol (PROVENTIL VENTOLIN) nebulizer solution 2.5 mg  2.5 mg Inhalation Q4H PRN    octreotide (SANDOSTATIN) injection 50 mcg  50 mcg IntraVENous TID    clopidogrel (PLAVIX) tablet 75 mg  75 mg Oral DAILY    sodium chloride (NS) flush 10 mL  10 mL InterCATHeter Q24H    sodium chloride (NS) flush 10 mL  10 mL InterCATHeter PRN    sodium chloride (NS) flush 10-40 mL  10-40 mL InterCATHeter Q8H    alteplase (CATHFLO) 1 mg in sterile water (preservative free) 1 mL injection  1 mg InterCATHeter PRN    bacitracin 500 unit/gram packet 1 Packet  1 Packet Topical PRN    0.9% sodium chloride infusion 250 mL  250 mL IntraVENous PRN    lactated Ringers infusion  25 mL/hr IntraVENous CONTINUOUS    fat emulsion 20% (LIPOSYN, INTRALIPID) infusion 500 mL  500 mL IntraVENous Q MON, WED & FRI    HYDROmorphone (PF) (DILAUDID) injection 1 mg  1 mg IntraVENous Q1H PRN    aspirin chewable tablet 81 mg  81 mg Oral DAILY    insulin regular (NOVOLIN R, HUMULIN R) injection   SubCUTAneous Q6H    morphine (PF)  mg/30 ml   IntraVENous CONTINUOUS    glucose chewable tablet 16 g  4 Tab Oral PRN    glucagon (GLUCAGEN) injection 1 mg  1 mg IntraMUSCular PRN    dextrose 10 % infusion 125-250 mL  125-250 mL IntraVENous PRN    pantoprazole (PROTONIX) 40 mg in sodium chloride 0.9 % 10 mL injection  40 mg IntraVENous DAILY    prochlorperazine (COMPAZINE) with saline injection 5 mg  5 mg IntraVENous Q6H PRN    anidulafungin (ERAXIS) 100 mg in 0.9% sodium chloride 130 mL IVPB  100 mg IntraVENous Q24H    ondansetron (ZOFRAN) injection 8 mg  8 mg IntraVENous Q6H PRN    sodium chloride (NS) flush 5-10 mL  5-10 mL IntraVENous Q8H    sodium chloride (NS) flush 5-10 mL  5-10 mL IntraVENous PRN    naloxone (NARCAN) injection 0.4 mg  0.4 mg IntraVENous PRN    diphenhydrAMINE (BENADRYL) injection 12.5 mg  12.5 mg IntraVENous Q4H PRN    piperacillin-tazobactam (ZOSYN) 3.375 g in 0.9% sodium chloride (MBP/ADV) 100 mL  3.375 g IntraVENous Q8H         ADDENDUM:  Samir Nieves MD  Pt seen and examined. No acute surgical issues. Wound vac was changed today. Will stop octreotide due to nausea. NPO with sips of apple juice and gingerale.   Continue antibiotic and monitor hemoglobin level

## 2017-08-03 NOTE — PROGRESS NOTES
Problem: Falls - Risk of  Goal: *Absence of falls  Outcome: Progressing Towards Goal  Patient is without falls at this time. Bed is locked and in lowest position. Call bell and bedside table are within reach. Goal: *Knowledge of fall prevention  Outcome: Progressing Towards Goal  Patient is encouraged to call when assistance is needed. Problem: Pressure Injury - Risk of  Goal: *Prevention of pressure ulcer  Outcome: Progressing Towards Goal  Patient is without skin breakdown at this time. Problem: Surgical Pathway Post-Op Day 2 through Discharge  Goal: *Optimal pain control at patients stated goal  Outcome: Progressing Towards Goal  Pain is being controlled with the use of PRN pain medications and a PCA pump at this time.   Goal: *Tolerating diet  Outcome: Progressing Towards Goal  Patient is NPO with sips of clears  Goal: *Demonstrates progressive activity  Outcome: Progressing Towards Goal  Patient is up with assistance

## 2017-08-03 NOTE — PROGRESS NOTES
Bedside shift change report given to [address] (oncoming nurse) by Mikala Bustamante (offgoing nurse). Report included the following information SBAR, Intake/Output, MAR, Recent Results and Cardiac Rhythm NSR.

## 2017-08-03 NOTE — WOUND CARE
Seen today by request of RN, Peterson Wilkins. VAC with fluctuating seal and some drape has been added by RN to troubleshoot. Green enteric contents noted on skin. VAC dressing removed, skin cleaned with Marathon applied. Appears the leaks are beginning at natural body crease on left side; also noted Adalberto Reek drain is too long with wound franco in and the excess length is over the wound margin on left side. Placed contact layer of petroleum gauze over bowel, trimmed Rabia drain to rest within wound bed and over the gauze, 1 piece of white foam placed. ~450cc green effluent in cannister. 125 mmHg continuous suction achieved. New cannister placed.    Will continue to follow while admitted to hospital.   Ellen Watkins, [facility]

## 2017-08-03 NOTE — PROGRESS NOTES
ID Progress Note  8/3/2017    Subjective:     Problems with the wound vac over the weekend    Objective:     Antibiotics:  1. Zosyn   2. Eraxis      Vitals:   Visit Vitals    /59 (BP 1 Location: Right arm, BP Patient Position: At rest)    Pulse 99    Temp 97.9 °F (36.6 °C)    Resp 16    Ht 5' 4\" (1.626 m)    Wt 156.5 kg (345 lb 0.3 oz)    SpO2 99%    Breastfeeding No    BMI 59.22 kg/m2        Tmax:  Temp (24hrs), Av.3 °F (36.8 °C), Min:97.9 °F (36.6 °C), Max:98.7 °F (37.1 °C)      Exam:  Lungs:  clear to auscultation bilaterally  Heart:  regularly irregular rhythm  Abdomen:  abnormal findings:  tenderness moderate in the entire abdomen, hypoactive bowel sounds    Labs:      Recent Labs      17   0247  17   0504  17   0632   WBC  10.2  9.6  10.5   HGB  7.1*  7.6*  8.6*   PLT  313  336  385   BUN  23*  24*  21*   CREA  0.86  0.79  0.85       Cultures:     Lab Results   Component Value Date/Time    Specimen Description: URINE 2009 07:45 PM     Lab Results   Component Value Date/Time    Culture result: NO GROWTH ON SOLID MEDIA AT 4 DAYS 2017 12:17 PM    Culture result:  2017 12:17 PM     **METHICILLIN RESISTANT STAPHYLOCOCCUS AUREUS**  ISOLATED FROM THIO BROTH ONLY      Culture result: NO GROWTH 5 DAYS 2017 02:35 PM       Radiology:     Line/Insert Date:           Assessment:     1. Ischemic gut with frozen abdomen  2. Leukocytosis--back up after surgery--trending back down overall--down to normal  3. Cultures negative to date    Objective:     1. Vancomycin stopped yesterday  2. Follow up cultures and studies  3. Work up fevers  4.  Stop Robson Monae MD

## 2017-08-03 NOTE — PROGRESS NOTES
Music Therapy Assessment    Court [place] [id number]  [id number]    1975  46 y.o.  female    Patient Telephone Number: [phone] (home)   Holiness Affiliation: Non Methodist   Language: English   Extended Emergency Contact Information  Primary Emergency Contact: Xi Chairez  Address: [place] [facility] [address] [place] [address] Phone: [phone]  Relation: Parent   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    Abdominal pain 06/25/2017    Perforated bowel ([facility] 06/06/2017    Right flank pain 08/22/2011    Crohn's disease ([facility] 08/15/2011    DVT (deep venous thrombosis) ([facility] 08/15/2011    Incarcerated ventral hernia 08/15/2011        Date: 8/3/2017       Mental Status:   [x] Alert [  ] Joellyn Merles [  ]  Confused  [  ] Minimally responsive    Communication Status: [  ] Impaired Speech [  ] Nonverbal     Physical Status:   [  ] Oxygen in use  [  ] Hard of Hearing [  ] Vision Impaired  [  ] Ambulatory  [  ] Ambulatory with assistance [  ] Non-ambulatory -N/A    Music Preferences, Background: Classical, [address], Pop, Jazz, especially Joanie [address] [facility] and [place]; The patient sang in the school choir, and took guitar and cello lessons. Today, the pt's nurse said she'd observed the pt to enjoy songs from movies, e.g. The Bare Necessities from [employer]. Clinical Problem addressed: Support healthy coping. Goal(s) met in session:  Physical/Pain management (Scale of 1-10):    Pre-session rating: N/A: Please see Session Observations below. Post-session rating: N/A: Please see Session Observations below.   [  ] Increased relaxation   [  ] Regulated breathing patterns  [  ] Decreased muscle tension   [  ] Minimized physical distress     Emotional/Psychological:  [  ] Increased self-expression   [  ] Decreased aggressive behavior   [  ] Decreased sadness   [  ] Discussed healthy coping skills     [  ] Improved mood    [  ] Decreased withdrawn behavior     Social:  [  ] Decreased feelings of isolation/loneliness [x] Positive social interaction   [  ] Provided support and/or comfort for family/friends    Spiritual:  [  ] Spiritual support    [  ] Expressed peace  [  ] Expressed jalen    [  ] Discussed beliefs    Techniques Utilized (Check all that apply):   [  ] Procedural support MT [  ] Music for relaxation [  ] Patient preferred music  [  ] Suzie analysis  [  ] Song choice  [  ] Music for validation  [  ] Entrainment  [  ] Progressive muscle relax. [  ] Guided visualization  [  ] Maninder Dunlap  [  ] Patient instrument playing [  ] [employer] [place] writing  [  ] Demetria Sacks along   [  ] Jalen Gomez  [  ] Sensory stimulation  [x] Active Listening  [  ] Music for spiritual support [  ] Making of CDs as gifts    Session Observations:  F/u visit; The patient (pt) was observed to be lying in bed with a low energy level compared to her energy during other music therapy visits. Her facial expression appeared tired and she spoke softer and slower than during other visits. The pt reported feeling tired because she had a busy day. The MT offered music therapy for relaxation. The pt expressed gratitude and declined this, saying she wanted to rest for now. The MT expressed understanding and shared that suzie sheets were brought for two songs that could be used for the next session. These songs were upbeat in nature because that's what the pt had requested in the previous music therapy session. The pt expressed enjoying both of the songs and said she'd enjoy singing them in the next session. She expressed gratitude for today's visit. Will follow as able.     DEEDEE WatkinsBC (Music Therapist-Board Certified)  Spiritual Care Department  Referral-based service

## 2017-08-03 NOTE — PROGRESS NOTES
Bedside and Verbal shift change report given to Yojana Snow RN (oncoming nurse) by Lina Aggarwal RN (offgoing nurse). Report included the following information SBAR, Kardex, Intake/Output, MAR, Recent Results and Cardiac Rhythm Sinus Tach.

## 2017-08-04 LAB
ANION GAP BLD CALC-SCNC: 6 MMOL/L (ref 5–15)
BASOPHILS # BLD AUTO: 0 K/UL (ref 0–0.1)
BASOPHILS # BLD: 0 % (ref 0–1)
BUN SERPL-MCNC: 23 MG/DL (ref 6–20)
BUN/CREAT SERPL: 29 (ref 12–20)
CALCIUM SERPL-MCNC: 8.3 MG/DL (ref 8.5–10.1)
CHLORIDE SERPL-SCNC: 108 MMOL/L (ref 97–108)
CO2 SERPL-SCNC: 25 MMOL/L (ref 21–32)
CREAT SERPL-MCNC: 0.78 MG/DL (ref 0.55–1.02)
EOSINOPHIL # BLD: 0.4 K/UL (ref 0–0.4)
EOSINOPHIL NFR BLD: 4 % (ref 0–7)
ERYTHROCYTE [DISTWIDTH] IN BLOOD BY AUTOMATED COUNT: 17.1 % (ref 11.5–14.5)
GLUCOSE BLD STRIP.AUTO-MCNC: 103 MG/DL (ref 65–100)
GLUCOSE BLD STRIP.AUTO-MCNC: 110 MG/DL (ref 65–100)
GLUCOSE BLD STRIP.AUTO-MCNC: 119 MG/DL (ref 65–100)
GLUCOSE BLD STRIP.AUTO-MCNC: 127 MG/DL (ref 65–100)
GLUCOSE SERPL-MCNC: 98 MG/DL (ref 65–100)
HCT VFR BLD AUTO: 24.5 % (ref 35–47)
HGB BLD-MCNC: 7.6 G/DL (ref 11.5–16)
LYMPHOCYTES # BLD AUTO: 33 % (ref 12–49)
LYMPHOCYTES # BLD: 3.3 K/UL (ref 0.8–3.5)
MAGNESIUM SERPL-MCNC: 1.9 MG/DL (ref 1.6–2.4)
MCH RBC QN AUTO: 28.7 PG (ref 26–34)
MCHC RBC AUTO-ENTMCNC: 31 G/DL (ref 30–36.5)
MCV RBC AUTO: 92.5 FL (ref 80–99)
MONOCYTES # BLD: 0.6 K/UL (ref 0–1)
MONOCYTES NFR BLD AUTO: 6 % (ref 5–13)
NEUTS SEG # BLD: 5.6 K/UL (ref 1.8–8)
NEUTS SEG NFR BLD AUTO: 57 % (ref 32–75)
PHOSPHATE SERPL-MCNC: 3.8 MG/DL (ref 2.6–4.7)
PLATELET # BLD AUTO: 362 K/UL (ref 150–400)
POTASSIUM SERPL-SCNC: 4.2 MMOL/L (ref 3.5–5.1)
RBC # BLD AUTO: 2.65 M/UL (ref 3.8–5.2)
SERVICE CMNT-IMP: ABNORMAL
SODIUM SERPL-SCNC: 139 MMOL/L (ref 136–145)
WBC # BLD AUTO: 9.9 K/UL (ref 3.6–11)

## 2017-08-04 PROCEDURE — 74011250636 HC RX REV CODE- 250/636: Performed by: SURGERY

## 2017-08-04 PROCEDURE — 74011000258 HC RX REV CODE- 258: Performed by: PHYSICIAN ASSISTANT

## 2017-08-04 PROCEDURE — C9113 INJ PANTOPRAZOLE SODIUM, VIA: HCPCS | Performed by: SURGERY

## 2017-08-04 PROCEDURE — 82962 GLUCOSE BLOOD TEST: CPT

## 2017-08-04 PROCEDURE — 74011250636 HC RX REV CODE- 250/636: Performed by: PHYSICIAN ASSISTANT

## 2017-08-04 PROCEDURE — 80048 BASIC METABOLIC PNL TOTAL CA: CPT | Performed by: SURGERY

## 2017-08-04 PROCEDURE — 74011000250 HC RX REV CODE- 250: Performed by: NURSE PRACTITIONER

## 2017-08-04 PROCEDURE — 74011250637 HC RX REV CODE- 250/637: Performed by: SURGERY

## 2017-08-04 PROCEDURE — 97116 GAIT TRAINING THERAPY: CPT

## 2017-08-04 PROCEDURE — 65660000000 HC RM CCU STEPDOWN

## 2017-08-04 PROCEDURE — 85025 COMPLETE CBC W/AUTO DIFF WBC: CPT | Performed by: SURGERY

## 2017-08-04 PROCEDURE — 84100 ASSAY OF PHOSPHORUS: CPT | Performed by: SURGERY

## 2017-08-04 PROCEDURE — 83735 ASSAY OF MAGNESIUM: CPT | Performed by: SURGERY

## 2017-08-04 PROCEDURE — 3331090002 HH PPS REVENUE DEBIT

## 2017-08-04 PROCEDURE — 74011250636 HC RX REV CODE- 250/636: Performed by: EMERGENCY MEDICINE

## 2017-08-04 PROCEDURE — 74011000250 HC RX REV CODE- 250: Performed by: SURGERY

## 2017-08-04 PROCEDURE — 74011000258 HC RX REV CODE- 258: Performed by: SURGERY

## 2017-08-04 PROCEDURE — 36415 COLL VENOUS BLD VENIPUNCTURE: CPT | Performed by: SURGERY

## 2017-08-04 PROCEDURE — 02HV33Z INSERTION OF INFUSION DEVICE INTO SUPERIOR VENA CAVA, PERCUTANEOUS APPROACH: ICD-10-PCS | Performed by: SURGERY

## 2017-08-04 PROCEDURE — 97530 THERAPEUTIC ACTIVITIES: CPT

## 2017-08-04 PROCEDURE — 74011250637 HC RX REV CODE- 250/637: Performed by: PHYSICIAN ASSISTANT

## 2017-08-04 PROCEDURE — 74011250636 HC RX REV CODE- 250/636: Performed by: INTERNAL MEDICINE

## 2017-08-04 PROCEDURE — 3331090001 HH PPS REVENUE CREDIT

## 2017-08-04 PROCEDURE — 74011000250 HC RX REV CODE- 250: Performed by: PHYSICIAN ASSISTANT

## 2017-08-04 PROCEDURE — 77030019952 HC CANSTR VAC ASST KCON -B

## 2017-08-04 PROCEDURE — 77030018717 HC DRSG GRNUFM KCON -B

## 2017-08-04 PROCEDURE — 74011250636 HC RX REV CODE- 250/636: Performed by: PHYSICAL MEDICINE & REHABILITATION

## 2017-08-04 PROCEDURE — 74011636637 HC RX REV CODE- 636/637: Performed by: PHYSICIAN ASSISTANT

## 2017-08-04 RX ADMIN — I.V. FAT EMULSION 500 ML: 20 EMULSION INTRAVENOUS at 19:07

## 2017-08-04 RX ADMIN — HYDROMORPHONE HYDROCHLORIDE 1 MG: 1 INJECTION, SOLUTION INTRAMUSCULAR; INTRAVENOUS; SUBCUTANEOUS at 17:46

## 2017-08-04 RX ADMIN — Medication: at 22:10

## 2017-08-04 RX ADMIN — POTASSIUM CHLORIDE: 2 INJECTION, SOLUTION, CONCENTRATE INTRAVENOUS at 19:07

## 2017-08-04 RX ADMIN — Medication 10 ML: at 17:53

## 2017-08-04 RX ADMIN — PIPERACILLIN SODIUM,TAZOBACTAM SODIUM 3.38 G: 3; .375 INJECTION, POWDER, FOR SOLUTION INTRAVENOUS at 06:30

## 2017-08-04 RX ADMIN — Medication 10 ML: at 22:13

## 2017-08-04 RX ADMIN — HYDROMORPHONE HYDROCHLORIDE 1 MG: 1 INJECTION, SOLUTION INTRAMUSCULAR; INTRAVENOUS; SUBCUTANEOUS at 02:42

## 2017-08-04 RX ADMIN — NYSTATIN 500000 UNITS: 100000 SUSPENSION ORAL at 22:12

## 2017-08-04 RX ADMIN — Medication 0.76 MG: at 06:30

## 2017-08-04 RX ADMIN — PIPERACILLIN SODIUM,TAZOBACTAM SODIUM 3.38 G: 3; .375 INJECTION, POWDER, FOR SOLUTION INTRAVENOUS at 14:57

## 2017-08-04 RX ADMIN — PIPERACILLIN SODIUM,TAZOBACTAM SODIUM 3.38 G: 3; .375 INJECTION, POWDER, FOR SOLUTION INTRAVENOUS at 22:12

## 2017-08-04 RX ADMIN — HYDROMORPHONE HYDROCHLORIDE 1 MG: 1 INJECTION, SOLUTION INTRAMUSCULAR; INTRAVENOUS; SUBCUTANEOUS at 15:27

## 2017-08-04 RX ADMIN — SODIUM CHLORIDE 40 MG: 9 INJECTION INTRAMUSCULAR; INTRAVENOUS; SUBCUTANEOUS at 09:40

## 2017-08-04 RX ADMIN — Medication: at 02:35

## 2017-08-04 RX ADMIN — ASPIRIN 81 MG CHEWABLE TABLET 81 MG: 81 TABLET CHEWABLE at 09:40

## 2017-08-04 RX ADMIN — Medication 10 ML: at 06:30

## 2017-08-04 RX ADMIN — Medication 0.76 MG: at 22:13

## 2017-08-04 RX ADMIN — CLOPIDOGREL BISULFATE 75 MG: 75 TABLET ORAL at 09:40

## 2017-08-04 RX ADMIN — Medication: at 14:17

## 2017-08-04 RX ADMIN — Medication 0.76 MG: at 14:57

## 2017-08-04 RX ADMIN — HYDROMORPHONE HYDROCHLORIDE 1 MG: 1 INJECTION, SOLUTION INTRAMUSCULAR; INTRAVENOUS; SUBCUTANEOUS at 22:47

## 2017-08-04 RX ADMIN — NYSTATIN 500000 UNITS: 100000 SUSPENSION ORAL at 09:40

## 2017-08-04 RX ADMIN — NYSTATIN 500000 UNITS: 100000 SUSPENSION ORAL at 17:51

## 2017-08-04 NOTE — PROGRESS NOTES
NUTRITION       Recommendations:  1. Continue TPN and lipids as ordered  2. Check baseline Triglycerides  3. Continue daily weights (standing scale preferred secondary to 10+kg difference from bedscale)  4. Continue to document all liquid/ice chip intake in I/O's    Brief follow up. Chart reviewed, discussed with RN and team during interdisciplinary rounds. Pt remains on TPN as sole source of nutrition and is meeting 100% of estimated needs. BG well controlled for optimal wound healing. TPN: 5%AA D20 @ 85 mL/hr x 1 hour    @ 172 mL/hr x 13 hours    @ 85 mL/hr x last hour   + 20% lipids, 500 mL 3x/week   + MVI, thiamine (100 mg), trace elements (3x/week), Zinc Sulfate (10 mg 3x/week), 14 units insulin/L  -- This is providing a daily average of 2546 kcal, 120 g protein in 2406 mL-- meeting 100% of estimated needs    LR: 25 mL/hr-- 600 mL/day. Drain Output vs PO intake:  8/3: 1575 mL, 60 mL  8/2: 2100 mL, 0 mL  8/1: 1050 mL, 0 mL    Last 3 Recorded Weights in this Encounter    08/02/17 1655 08/03/17 0300 08/03/17 0924   Weight: 148.8 kg (328 lb 0.7 oz) Standing 156.5 kg (345 lb 0.3 oz)  Bed 156.5 kg (345 lb 0.3 oz)  Bed     Estimated Nutrition Needs:   Kcals/day: 2500 Kcals/day (7994-6850 kcal/day ([place] Jeor x 1.2-1.3))  Protein: 110 g (110-136 g/day (2-2.5 g/kg IBW))  Fluid:  (1  ml/kcal)     Based On:  [place] Jeor  Weight Used: Actual wt (142.7 kg standing scale on 7/19)    Cely Cea, [address]

## 2017-08-04 NOTE — PROGRESS NOTES
Bedside and Verbal shift change report given to [facility]' (oncoming nurse) by Jeremy Francis (offgoing nurse). Report included the following information SBAR, OR Summary, Procedure Summary, Intake/Output, MAR, Accordion, Recent Results, Med Rec Status and Cardiac Rhythm Normal Sinus.

## 2017-08-04 NOTE — PROGRESS NOTES
Bedside and Verbal shift change report given to fernanda (oncoming nurse) by Valeria Scott (offgoing nurse). Report included the following information SBAR, Kardex, OR Summary, Procedure Summary, Intake/Output, MAR, Recent Results and Cardiac Rhythm nsr.

## 2017-08-04 NOTE — PROGRESS NOTES
Attempting to see pt however pt is having nsg care of drains. Will attempt to follow up later this date.

## 2017-08-04 NOTE — PROGRESS NOTES
Problem: Mobility Impaired (Adult and Pediatric)  Goal: *Acute Goals and Plan of Care (Insert Text)  Physical Therapy Goals,   updated 8/4/2017    1. Pt will ambulate in hallways for 250 feet with mod Ind within 7 days  2. Patient will perform bed mobility with mod independence within 7 days. 3. Patient will ascend and descend 4 steps within 7 days. 4. Patient will perform sit to stand independently within 7 days. Physical Therapy Goals  Revised 7/28/2017  1. Patient will transfer from bed to chair and chair to bed with modified independence using the least restrictive device within 7 day(s). 2. Patient will perform sit to stand with modified independence within 7 day(s). 3. Patient will ambulate with modified independence for 250 feet with the least restrictive device within 7 day(s). 4. Patient will progress to ambulating in the hallway within 7 days. 5. Patient will ascend and descend 4 steps within 7 days. Physical Therapy Goals  Revised 7/20/2017  1. Patient will move from supine to sit and sit to supine , scoot up and down and roll side to side in bed with independence within 7 day(s). 2. Patient will transfer from bed to chair and chair to bed with supervision/set-up using the least restrictive device within 7 day(s). 3. Patient will perform sit to stand with supervision/set-up within 7 day(s). 4. Patient will ambulate with supervision/set-up for 200 feet with the least restrictive device within 7 day(s). 5. Patient will ascend/descend 7 stairs with one handrail(s) with supervision/set-up within 7 day(s). Physical Therapy Goals  7/12/2017  1. Patient will move from supine to sit and sit to supine , scoot up and down and roll side to side in bed with independence within 7 day(s). 2. Patient will transfer from bed to chair and chair to bed with supervision/set-up using the least restrictive device within 7 day(s).   3. Patient will perform sit to stand with supervision/set-up within 7 day(s). 4. Patient will ambulate with supervision/set-up for 50 feet with the least restrictive device within 7 day(s). PHYSICAL THERAPY TREATMENT: WEEKLY REASSESSMENT  Patient: Jane Chung (46 y.o. female)  Date: 8/4/2017  Diagnosis: N/V intractable post-opt pain  Abdominal pain  poor iv access  DEAD BOWEL  SMALL BOWEL OBSTRUCTION  central line placement  Abdominal pain <principal problem not specified>  Procedure(s) (LRB):  SPECIAL PROCEDURE OUTSIDE OF OR (N/A) 22 Days Post-Op  Precautions:  multiple drains. , wound vac         ASSESSMENT:  Pt has been making steady progress, still remains limited by multiple drains and lines, wound vac. She was given the Quintella Quita to begin walking in hallway by wound care nurse. Pt can be briefly disconnected from wall suction to ambulate in halls but must be placed back on after tx.  (wound vac comes with IVpole). Pt able to cme to sit with min assist due to drains and weak abdominals. Pt up to [facility] and then onto stand up scale prior to leaving room. Pt gaits 80 feet in hallway with CGA and assist with IV pole/drains. Pt returned to chair at bedside. Patient's progression toward goals since last assessment: pt imprvong with mobility and can now leave room by being taken off wall suction for ambulation (*must be placed back on wall suction immediately after walk)       PLAN:  Goals have been updated based on progression since last assessment. Patient continues to benefit from skilled intervention to address the above impairments. Continue to follow the patient 3 times a week to address goals.   Planned Interventions:  [X]              Bed Mobility Training             [ ]       Neuromuscular Re-Education  [X]              Transfer Training                   [ ]       Orthotic/Prosthetic Training  [X]              Gait Training                         [ ]       Modalities  [X]              Therapeutic Exercises           [ ]       Edema Management/Control  [ ]              Therapeutic Activities            [ ]       Patient and Family Training/Education  [ ]              Other (comment):  Discharge Recommendations: To Be Determined, doing well and may go home with HHPT  Further Equipment Recommendations for Discharge: tbd       SUBJECTIVE:   Patient stated Lata Pantoja got the OK to walk out in the halls.       OBJECTIVE DATA SUMMARY:   Critical Behavior:  Neurologic State: Alert  Orientation Level: Oriented X4  Cognition: Appropriate decision making        Strength:    decreased but improving. Functional Mobility Training:  Bed Mobility:     Supine to Sit: Assist x1;Minimum assistance      Transfers:  Sit to Stand: Assist x1;Contact guard assistance            Balance:  Sitting: Intact  Standing: Intact; With support  Standing - Static: Good  Standing - Dynamic : Good  Ambulation/Gait Training:  Distance (ft): 80 Feet (ft)  Assistive Device:  (CGA and assist with IV pole/lines/drains)  Ambulation - Level of Assistance: Contact guard assistance        Gait Abnormalities: Decreased step clearance; Antalgic (lateral sway)        Base of Support: Widened     Speed/Liz: Pace decreased (<100 feet/min)  Step Length: Right shortened;Left shortened           Pain:      using PCA               Activity Tolerance:   good  Please refer to the flowsheet for vital signs taken during this treatment.   After treatment:   [X]  Patient left in no apparent distress sitting up in chair  [ ]  Patient left in no apparent distress in bed  [X]  Call bell left within reach  [X]  Nursing notified  [ ]  Caregiver present  [ ]  Bed alarm activated      COMMUNICATION/COLLABORATION:   The patients plan of care was discussed with: Registered Nurse     Darrell Rincon, PT   Time Calculation: 36 mins

## 2017-08-04 NOTE — PROGRESS NOTES
Chart reviewed, spoke to Dr Jamaica Robertson , pain is controlled, currently on Pca.   will sign off . Please do not hesitate to reconsult if palliative care assistance is needed for transition to oral pain medication.     Thank you    Sharif Weber MD    Palliative Care team    [phone]([phone])

## 2017-08-04 NOTE — WOUND CARE
VAC seal becoming overwhelmed with wall cannister full of green effluent. VAC cannister with ~250cc green effluent. Chose to change dressing due to effluent on her skin under the drape even though seal was reclaimed after wall cannister changed. Discussed need to manage cannister levels with RN, Tonia Bobby, in order to keep a seal.   Petroleum gauze contact layer placed over bowel mucosa, then Tonia Bares drain resting on petroleum gauze, then 1 piece of black sponge. 125 mmHg continuous suction achieved. Rabia drain to wall suction. Will continue to follow.   Padmaja Pritchard, CWOCN

## 2017-08-04 NOTE — PROGRESS NOTES
Progress Note    Patient: Jae  MRN: [id number]  SSN: [id number]    YOB: 1975  Age: 46 y.o. Sex: female      Admit Date: 2017    22 Days Post-Op    Procedure:  Procedure(s):  SPECIAL PROCEDURE OUTSIDE OF OR    Subjective:     No acute surgical issues. Pt reported pain is under control. Nausea has improved since octreotide was removed. Objective:     Visit Vitals    BP 97/52 (BP Patient Position: At rest)    Pulse 100    Temp 98.4 °F (36.9 °C)    Resp 17    Ht 5' 4\" (1.626 m)    Wt 345 lb 0.3 oz (156.5 kg)    SpO2 100%    Breastfeeding No    BMI 59.22 kg/m2       Temp (24hrs), Av.5 °F (36.9 °C), Min:97.9 °F (36.6 °C), Max:99.4 °F (37.4 °C)        Physical Exam:    Gen:  NAD  Pulm:  Unlabored  Abd:  S/ND/appropriate TTP  Wound:   Wound vac intact with bilious drainage from [place] drain and wound vac.      Recent Results (from the past 24 hour(s))   GLUCOSE, POC    Collection Time: 17 12:09 PM   Result Value Ref Range    Glucose (POC) 110 (H) 65 - 100 mg/dL    Performed by Claudean Hull    GLUCOSE, POC    Collection Time: 17  6:05 PM   Result Value Ref Range    Glucose (POC) 109 (H) 65 - 100 mg/dL    Performed by Claudean Hull    GLUCOSE, POC    Collection Time: 17 11:10 PM   Result Value Ref Range    Glucose (POC) 106 (H) 65 - 100 mg/dL    Performed by REYES ALEXIS    METABOLIC PANEL, BASIC    Collection Time: 17  3:41 AM   Result Value Ref Range    Sodium 139 136 - 145 mmol/L    Potassium 4.2 3.5 - 5.1 mmol/L    Chloride 108 97 - 108 mmol/L    CO2 25 21 - 32 mmol/L    Anion gap 6 5 - 15 mmol/L    Glucose 98 65 - 100 mg/dL    BUN 23 (H) 6 - 20 MG/DL    Creatinine 0.78 0.55 - 1.02 MG/DL    BUN/Creatinine ratio 29 (H) 12 - 20      GFR est AA >60 >60 ml/min/1.73m2    GFR est non-AA >60 >60 ml/min/1.73m2    Calcium 8.3 (L) 8.5 - 10.1 MG/DL   MAGNESIUM    Collection Time: 17  3:41 AM   Result Value Ref Range    Magnesium 1.9 1.6 - 2.4 mg/dL   PHOSPHORUS    Collection Time: 08/04/17  3:41 AM   Result Value Ref Range    Phosphorus 3.8 2.6 - 4.7 MG/DL   CBC WITH AUTOMATED DIFF    Collection Time: 08/04/17  3:41 AM   Result Value Ref Range    WBC 9.9 3.6 - 11.0 K/uL    RBC 2.65 (L) 3.80 - 5.20 M/uL    HGB 7.6 (L) 11.5 - 16.0 g/dL    HCT 24.5 (L) 35.0 - 47.0 %    MCV 92.5 80.0 - 99.0 FL    MCH 28.7 26.0 - 34.0 PG    MCHC 31.0 30.0 - 36.5 g/dL    RDW 17.1 (H) 11.5 - 14.5 %    PLATELET 335 156 - 711 K/uL    NEUTROPHILS 57 32 - 75 %    LYMPHOCYTES 33 12 - 49 %    MONOCYTES 6 5 - 13 %    EOSINOPHILS 4 0 - 7 %    BASOPHILS 0 0 - 1 %    ABS. NEUTROPHILS 5.6 1.8 - 8.0 K/UL    ABS. LYMPHOCYTES 3.3 0.8 - 3.5 K/UL    ABS. MONOCYTES 0.6 0.0 - 1.0 K/UL    ABS. EOSINOPHILS 0.4 0.0 - 0.4 K/UL    ABS. 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 The stomach and proximal small bowel loops are distended. There is a loop of  small bowel in the midabdomen which is mildly dilated and does not demonstrate  enhancement in the wall and there is suspicion of pneumatosis and this leads to  a loop of small bowel which demonstrates thickening of the wall and findings are  suspicious for ischemia. 2. There is extensive mesenteric edema and a small amount of ascites in the  pelvis.              Superior mesenteric artery thrombosis [facility]) ICD-10-CM: K55.069  ICD-9-CM: 557.0  6/28/2017 Clinically Undetermined    Overview Signed 7/3/2017  5:09 PM by Medina Landaverde MD     CT abd/pelvis 6/28/17:  3. There is nonocclusive thrombus in the SMA.                Abdominal pain ICD-10-CM: R10.9  ICD-9-CM: 789.00  6/25/2017 Yes              Plan/Recommendations/Medical Decision Making:     - Renew TPN  - Continue antibiotic per ID  - NPO with sips  - Continue wound care  - Out of bed as tolerated    Signed By: Nico Soria MD     August 4, 2017

## 2017-08-04 NOTE — PROGRESS NOTES
ID Progress Note  2017    Subjective:     Problems with the wound vac over the weekend    Objective:     Antibiotics:  1. Zosyn   2. Eraxis      Vitals:   Visit Vitals    /55 (BP 1 Location: Left arm, BP Patient Position: At rest)    Pulse 97    Temp 98.2 °F (36.8 °C)    Resp 17    Ht 5' 4\" (1.626 m)    Wt 156.5 kg (345 lb 0.3 oz)    SpO2 100%    Breastfeeding No    BMI 59.22 kg/m2        Tmax:  Temp (24hrs), Av.4 °F (36.9 °C), Min:98.2 °F (36.8 °C), Max:98.7 °F (37.1 °C)      Exam:  Lungs:  clear to auscultation bilaterally  Heart:  regularly irregular rhythm  Abdomen:  abnormal findings:  tenderness moderate in the entire abdomen, hypoactive bowel sounds    Labs:      Recent Labs      17   0341  17   0247  17   0504   WBC  9.9  10.2  9.6   HGB  7.6*  7.1*  7.6*   PLT  362  313  336   BUN  23*  23*  24*   CREA  0.78  0.86  0.79       Cultures:     Lab Results   Component Value Date/Time    Specimen Description: URINE 2009 07:45 PM     Lab Results   Component Value Date/Time    Culture result: NO GROWTH ON SOLID MEDIA AT 4 DAYS 2017 12:17 PM    Culture result:  2017 12:17 PM     **METHICILLIN RESISTANT STAPHYLOCOCCUS AUREUS**  ISOLATED FROM THIO BROTH ONLY      Culture result: NO GROWTH 5 DAYS 2017 02:35 PM       Radiology:     Line/Insert Date:           Assessment:     1. Ischemic gut with frozen abdomen  2. Leukocytosis--back up after surgery--trending back down overall--down to normal  3. Cultures negative to date    Objective:     1. Vancomycin stopped yesterday  2. Follow up cultures and studies  3. Work up fevers  4.  Stop Chinyere Hoffman MD

## 2017-08-04 NOTE — ROUTINE PROCESS
1600 Bedside report received from Gracie Morton [facility]. Orders reviewed and acknowledged at this time. Patient walked the hallway with physical therapy. OOB to chair now. 1630 Assessment done; fred, vaccuum and malecot drains in place with dressings CDI. Fred drain emptied with 425 output. 1900 Lipids and TPN started. 1945 Bedside report given to MAURI Hodgson. PCA rate check completed and signed off at this time. Patient continues on TPN. No other needs at this time.

## 2017-08-05 LAB
GLUCOSE BLD STRIP.AUTO-MCNC: 126 MG/DL (ref 65–100)
GLUCOSE BLD STRIP.AUTO-MCNC: 94 MG/DL (ref 65–100)
GLUCOSE BLD STRIP.AUTO-MCNC: 95 MG/DL (ref 65–100)
GLUCOSE BLD STRIP.AUTO-MCNC: 98 MG/DL (ref 65–100)
SERVICE CMNT-IMP: ABNORMAL
SERVICE CMNT-IMP: NORMAL

## 2017-08-05 PROCEDURE — 74011250636 HC RX REV CODE- 250/636: Performed by: INTERNAL MEDICINE

## 2017-08-05 PROCEDURE — 74011250636 HC RX REV CODE- 250/636: Performed by: SURGERY

## 2017-08-05 PROCEDURE — 3331090002 HH PPS REVENUE DEBIT

## 2017-08-05 PROCEDURE — 74011250636 HC RX REV CODE- 250/636: Performed by: PHYSICAL MEDICINE & REHABILITATION

## 2017-08-05 PROCEDURE — 74011000258 HC RX REV CODE- 258: Performed by: SURGERY

## 2017-08-05 PROCEDURE — 74011000250 HC RX REV CODE- 250: Performed by: SURGERY

## 2017-08-05 PROCEDURE — 74011250636 HC RX REV CODE- 250/636: Performed by: EMERGENCY MEDICINE

## 2017-08-05 PROCEDURE — 3331090001 HH PPS REVENUE CREDIT

## 2017-08-05 PROCEDURE — 74011250636 HC RX REV CODE- 250/636: Performed by: NURSE PRACTITIONER

## 2017-08-05 PROCEDURE — 74011000250 HC RX REV CODE- 250: Performed by: NURSE PRACTITIONER

## 2017-08-05 PROCEDURE — 65660000000 HC RM CCU STEPDOWN

## 2017-08-05 PROCEDURE — 82962 GLUCOSE BLOOD TEST: CPT

## 2017-08-05 PROCEDURE — C9113 INJ PANTOPRAZOLE SODIUM, VIA: HCPCS | Performed by: SURGERY

## 2017-08-05 PROCEDURE — 74011250637 HC RX REV CODE- 250/637: Performed by: SURGERY

## 2017-08-05 PROCEDURE — 74011636637 HC RX REV CODE- 636/637: Performed by: NURSE PRACTITIONER

## 2017-08-05 PROCEDURE — 74011000258 HC RX REV CODE- 258: Performed by: NURSE PRACTITIONER

## 2017-08-05 PROCEDURE — 74011250637 HC RX REV CODE- 250/637: Performed by: PHYSICIAN ASSISTANT

## 2017-08-05 RX ADMIN — Medication: at 09:25

## 2017-08-05 RX ADMIN — Medication 0.76 MG: at 22:11

## 2017-08-05 RX ADMIN — Medication 10 ML: at 09:27

## 2017-08-05 RX ADMIN — HYDROMORPHONE HYDROCHLORIDE 1 MG: 1 INJECTION, SOLUTION INTRAMUSCULAR; INTRAVENOUS; SUBCUTANEOUS at 20:26

## 2017-08-05 RX ADMIN — Medication 10 ML: at 13:31

## 2017-08-05 RX ADMIN — POTASSIUM CHLORIDE: 2 INJECTION, SOLUTION, CONCENTRATE INTRAVENOUS at 17:47

## 2017-08-05 RX ADMIN — PIPERACILLIN SODIUM,TAZOBACTAM SODIUM 3.38 G: 3; .375 INJECTION, POWDER, FOR SOLUTION INTRAVENOUS at 06:24

## 2017-08-05 RX ADMIN — NYSTATIN 500000 UNITS: 100000 SUSPENSION ORAL at 13:29

## 2017-08-05 RX ADMIN — ASPIRIN 81 MG CHEWABLE TABLET 81 MG: 81 TABLET CHEWABLE at 08:52

## 2017-08-05 RX ADMIN — PIPERACILLIN SODIUM,TAZOBACTAM SODIUM 3.38 G: 3; .375 INJECTION, POWDER, FOR SOLUTION INTRAVENOUS at 13:28

## 2017-08-05 RX ADMIN — SODIUM CHLORIDE 40 MG: 9 INJECTION INTRAMUSCULAR; INTRAVENOUS; SUBCUTANEOUS at 08:52

## 2017-08-05 RX ADMIN — CLOPIDOGREL BISULFATE 75 MG: 75 TABLET ORAL at 08:52

## 2017-08-05 RX ADMIN — Medication 10 ML: at 22:12

## 2017-08-05 RX ADMIN — Medication 10 ML: at 17:52

## 2017-08-05 RX ADMIN — Medication: at 19:27

## 2017-08-05 RX ADMIN — NYSTATIN 500000 UNITS: 100000 SUSPENSION ORAL at 09:04

## 2017-08-05 RX ADMIN — NYSTATIN 500000 UNITS: 100000 SUSPENSION ORAL at 17:52

## 2017-08-05 RX ADMIN — NYSTATIN 500000 UNITS: 100000 SUSPENSION ORAL at 22:12

## 2017-08-05 RX ADMIN — Medication 10 ML: at 14:00

## 2017-08-05 RX ADMIN — Medication 10 ML: at 22:11

## 2017-08-05 RX ADMIN — Medication 0.76 MG: at 13:30

## 2017-08-05 RX ADMIN — HYDROMORPHONE HYDROCHLORIDE 1 MG: 1 INJECTION, SOLUTION INTRAMUSCULAR; INTRAVENOUS; SUBCUTANEOUS at 15:59

## 2017-08-05 RX ADMIN — PIPERACILLIN SODIUM,TAZOBACTAM SODIUM 3.38 G: 3; .375 INJECTION, POWDER, FOR SOLUTION INTRAVENOUS at 22:11

## 2017-08-05 RX ADMIN — Medication 10 ML: at 06:25

## 2017-08-05 RX ADMIN — SODIUM CHLORIDE, SODIUM LACTATE, POTASSIUM CHLORIDE, AND CALCIUM CHLORIDE 25 ML/HR: 600; 310; 30; 20 INJECTION, SOLUTION INTRAVENOUS at 19:25

## 2017-08-05 RX ADMIN — Medication 0.76 MG: at 06:24

## 2017-08-05 NOTE — PROGRESS NOTES
Bedside and Verbal shift change report given to Yvonne Maynard (oncoming nurse) by Monse Lazcano (offgoing nurse). Report included the following information SBAR, OR Summary, Procedure Summary, Intake/Output, MAR, Accordion, Recent Results, Med Rec Status and Cardiac Rhythm Normal Sinus.

## 2017-08-05 NOTE — PROGRESS NOTES
AFiO2 (%) ADULT PROTOCOL: JET AEROSOL ASSESSMENT    Patient  Patricia You     46 y.o.   female     8/5/2017  2:17 AM    Breath Sounds Pre Procedure: Right Breath Sounds: Diminished                               Left Breath Sounds: Diminished    Breath Sounds Post Procedure:                                        Breathing pattern: Pre procedure Breathing Pattern: Regular          Post procedure      Heart Rate: Pre procedure             Post procedure      Resp Rate: Pre procedure Respirations: 14           Post procedure      Peak Flow: Pre bronchodilator             Post bronchodilator       FVC/FEV1:      Incentive Spirometry:  Actual Volume (ml): 1500 ml          Cough: Pre procedure Cough: Non-productive               Post procedure      Suctioned: NO    Sputum: Pre procedure                   Post procedure      Oxygen: O2 Device: Room air21%FiO2 (%) 21%     Changed: NO    SpO2: Pre procedure SpO2: 100 %   without oxygen              Post procedure    without oxygen    Nebulizer Therapy: Current medications        Changed: YES    Smoking History: former smoker    Problem List:   Patient Active Problem List   Diagnosis Code    Crohn's disease ([facility] [address] K50.90    DVT (deep venous thrombosis) ([facility]) I82.409    Incarcerated ventral hernia K46.0    Right flank pain R10.9    Perforated bowel ([address] K63.1    Abdominal pain R10.9    Small bowel ischemia ([address] K55.9    Superior mesenteric artery thrombosis ([facility] [address] K55.069    Enterocutaneous fistula K63.2       Respiratory Therapist: Jenelle Raza, RT

## 2017-08-05 NOTE — PROGRESS NOTES
Problem: Surgical Pathway Post-Op Day 2 through Discharge  Goal: Nutrition/Diet  Outcome: Progressing Towards Goal  Nocturnal TPN with sips of Clear liquids - fairly tolerated, pt stated that apple juice causes nausea  Goal: Respiratory  Outcome: Progressing Towards Goal  Patient gets dyspneic upon exertion; lower lung fields slightly diminished but clear; no cough noted   Goal: Psychosocial  Outcome: Progressing Towards Goal  Patient appears anxious about future treatments and outcomes   Goal: *Optimal pain control at patients stated goal  Outcome: Progressing Towards Goal  PCA   Goal: *Tolerating diet  Outcome: Progressing Towards Goal  Nocturnal TPN, sips of clears, too much liquid and certain liquids (apple juice) make patient nauseous   Goal: *Lungs clear or at baseline  Outcome: Progressing Towards Goal  Breath sounds clear, slightly diminished     Comments:   Plan of care for today is to have patient up and walking by 15:30, pain control, and reducing patient anxiety

## 2017-08-05 NOTE — PROGRESS NOTES
Bedside and Verbal shift change report given to see (oncoming nurse) by Argenis Tello (offgoing nurse). Report included the following information SBAR, Kardex, OR Summary, Procedure Summary, Intake/Output, MAR, Recent Results and Cardiac Rhythm nsr, sinus tach.

## 2017-08-05 NOTE — PROGRESS NOTES
Subjective  Pt has been up and ambulating in the dick some. Pain relatively under control     Temp:  [97.9 °F (36.6 °C)-99.4 °F (37.4 °C)]   Pulse (Heart Rate):  []   BP: ()/(51-93)   Resp Rate:  [10-20]   O2 Sat (%):  [96 %-100 %]   Weight:  [328 lb 0.7 oz (148.8 kg)-348 lb 1.7 oz (157.9 kg)]   08/05 0701 - 08/05 1900  In: 582.8 [I.V.:582.8]  Out: 2750 [Urine:1900; Drains:850]  08/03 1901 - 08/05 0700  In: 2756.4 [I.V.:2756.4]  Out: 0339 [Urine:3300; Drains:3065]      Objective  Gen- Alert inNAD  Lungs- CTA  H- RRR  Abd- soft with positive bowel sounds  Wound appears to be getting smaller    Recent Results (from the past 24 hour(s))   GLUCOSE, POC    Collection Time: 08/04/17  5:39 PM   Result Value Ref Range    Glucose (POC) 103 (H) 65 - 100 mg/dL    Performed by Wse Fuentes    GLUCOSE, POC    Collection Time: 08/04/17 11:14 PM   Result Value Ref Range    Glucose (POC) 110 (H) 65 - 100 mg/dL    Performed by Anish MAN(CON)    GLUCOSE, POC    Collection Time: 08/05/17  5:34 AM   Result Value Ref Range    Glucose (POC) 126 (H) 65 - 100 mg/dL    Performed by Anish MAN(CON)    GLUCOSE, POC    Collection Time: 08/05/17 11:38 AM   Result Value Ref Range    Glucose (POC) 95 65 - 100 mg/dL    Performed by REYES ALEXIS          Active Problems:    Abdominal pain (6/25/2017)      Small bowel ischemia ([address] (6/28/2017)      Overview: CT abd/pelvis 6/28/17      1. The stomach and proximal small bowel loops are distended. There is a       loop of      small bowel in the midabdomen which is mildly dilated and does not       demonstrate      enhancement in the wall and there is suspicion of pneumatosis and this       leads to      a loop of small bowel which demonstrates thickening of the wall and       findings are      suspicious for ischemia. 2. There is extensive mesenteric edema and a small amount of ascites in       the      pelvis.       Superior mesenteric artery thrombosis ([address] (6/28/2017) Overview: CT abd/pelvis 6/28/17:      3. There is nonocclusive thrombus in the SMA.             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)          Assessment & Plan  Continue TPN  ABX per ID  Vac dressing   Continue npo with sips  Ambulate

## 2017-08-06 LAB
ANION GAP BLD CALC-SCNC: 6 MMOL/L (ref 5–15)
BASOPHILS # BLD AUTO: 0 K/UL (ref 0–0.1)
BASOPHILS # BLD: 0 % (ref 0–1)
BUN SERPL-MCNC: 19 MG/DL (ref 6–20)
BUN/CREAT SERPL: 28 (ref 12–20)
CALCIUM SERPL-MCNC: 8.6 MG/DL (ref 8.5–10.1)
CHLORIDE SERPL-SCNC: 109 MMOL/L (ref 97–108)
CO2 SERPL-SCNC: 28 MMOL/L (ref 21–32)
CREAT SERPL-MCNC: 0.69 MG/DL (ref 0.55–1.02)
EOSINOPHIL # BLD: 0.5 K/UL (ref 0–0.4)
EOSINOPHIL NFR BLD: 5 % (ref 0–7)
ERYTHROCYTE [DISTWIDTH] IN BLOOD BY AUTOMATED COUNT: 17.1 % (ref 11.5–14.5)
GLUCOSE BLD STRIP.AUTO-MCNC: 113 MG/DL (ref 65–100)
GLUCOSE BLD STRIP.AUTO-MCNC: 134 MG/DL (ref 65–100)
GLUCOSE BLD STRIP.AUTO-MCNC: 86 MG/DL (ref 65–100)
GLUCOSE BLD STRIP.AUTO-MCNC: 91 MG/DL (ref 65–100)
GLUCOSE SERPL-MCNC: 103 MG/DL (ref 65–100)
HCT VFR BLD AUTO: 23.7 % (ref 35–47)
HGB BLD-MCNC: 7.5 G/DL (ref 11.5–16)
LYMPHOCYTES # BLD AUTO: 28 % (ref 12–49)
LYMPHOCYTES # BLD: 2.7 K/UL (ref 0.8–3.5)
MAGNESIUM SERPL-MCNC: 1.8 MG/DL (ref 1.6–2.4)
MCH RBC QN AUTO: 29.1 PG (ref 26–34)
MCHC RBC AUTO-ENTMCNC: 31.6 G/DL (ref 30–36.5)
MCV RBC AUTO: 91.9 FL (ref 80–99)
MONOCYTES # BLD: 0.6 K/UL (ref 0–1)
MONOCYTES NFR BLD AUTO: 7 % (ref 5–13)
NEUTS SEG # BLD: 5.7 K/UL (ref 1.8–8)
NEUTS SEG NFR BLD AUTO: 60 % (ref 32–75)
PHOSPHATE SERPL-MCNC: 4.1 MG/DL (ref 2.6–4.7)
PLATELET # BLD AUTO: 358 K/UL (ref 150–400)
POTASSIUM SERPL-SCNC: 4.1 MMOL/L (ref 3.5–5.1)
RBC # BLD AUTO: 2.58 M/UL (ref 3.8–5.2)
SERVICE CMNT-IMP: ABNORMAL
SERVICE CMNT-IMP: ABNORMAL
SERVICE CMNT-IMP: NORMAL
SERVICE CMNT-IMP: NORMAL
SODIUM SERPL-SCNC: 143 MMOL/L (ref 136–145)
WBC # BLD AUTO: 9.5 K/UL (ref 3.6–11)

## 2017-08-06 PROCEDURE — 65660000000 HC RM CCU STEPDOWN

## 2017-08-06 PROCEDURE — 36415 COLL VENOUS BLD VENIPUNCTURE: CPT | Performed by: SURGERY

## 2017-08-06 PROCEDURE — 74011250637 HC RX REV CODE- 250/637: Performed by: SURGERY

## 2017-08-06 PROCEDURE — 84100 ASSAY OF PHOSPHORUS: CPT | Performed by: SURGERY

## 2017-08-06 PROCEDURE — 3331090002 HH PPS REVENUE DEBIT

## 2017-08-06 PROCEDURE — 74011636637 HC RX REV CODE- 636/637: Performed by: SURGERY

## 2017-08-06 PROCEDURE — 74011250636 HC RX REV CODE- 250/636: Performed by: INTERNAL MEDICINE

## 2017-08-06 PROCEDURE — 74011250636 HC RX REV CODE- 250/636: Performed by: PHYSICAL MEDICINE & REHABILITATION

## 2017-08-06 PROCEDURE — 74011250636 HC RX REV CODE- 250/636: Performed by: SURGERY

## 2017-08-06 PROCEDURE — 82962 GLUCOSE BLOOD TEST: CPT

## 2017-08-06 PROCEDURE — 83735 ASSAY OF MAGNESIUM: CPT | Performed by: SURGERY

## 2017-08-06 PROCEDURE — 77030019952 HC CANSTR VAC ASST KCON -B

## 2017-08-06 PROCEDURE — 85025 COMPLETE CBC W/AUTO DIFF WBC: CPT | Performed by: SURGERY

## 2017-08-06 PROCEDURE — 74011000258 HC RX REV CODE- 258: Performed by: SURGERY

## 2017-08-06 PROCEDURE — C9113 INJ PANTOPRAZOLE SODIUM, VIA: HCPCS | Performed by: SURGERY

## 2017-08-06 PROCEDURE — 80048 BASIC METABOLIC PNL TOTAL CA: CPT | Performed by: SURGERY

## 2017-08-06 PROCEDURE — 74011250636 HC RX REV CODE- 250/636: Performed by: NURSE PRACTITIONER

## 2017-08-06 PROCEDURE — 3331090001 HH PPS REVENUE CREDIT

## 2017-08-06 PROCEDURE — 74011250637 HC RX REV CODE- 250/637: Performed by: PHYSICIAN ASSISTANT

## 2017-08-06 PROCEDURE — 74011250636 HC RX REV CODE- 250/636: Performed by: EMERGENCY MEDICINE

## 2017-08-06 PROCEDURE — 74011000250 HC RX REV CODE- 250: Performed by: SURGERY

## 2017-08-06 RX ADMIN — Medication 10 ML: at 21:38

## 2017-08-06 RX ADMIN — Medication 10 ML: at 14:08

## 2017-08-06 RX ADMIN — NYSTATIN 500000 UNITS: 100000 SUSPENSION ORAL at 21:37

## 2017-08-06 RX ADMIN — Medication 10 ML: at 18:43

## 2017-08-06 RX ADMIN — Medication 10 ML: at 18:49

## 2017-08-06 RX ADMIN — PIPERACILLIN SODIUM,TAZOBACTAM SODIUM 3.38 G: 3; .375 INJECTION, POWDER, FOR SOLUTION INTRAVENOUS at 06:27

## 2017-08-06 RX ADMIN — HYDROMORPHONE HYDROCHLORIDE 1 MG: 1 INJECTION, SOLUTION INTRAMUSCULAR; INTRAVENOUS; SUBCUTANEOUS at 10:08

## 2017-08-06 RX ADMIN — POTASSIUM CHLORIDE: 2 INJECTION, SOLUTION, CONCENTRATE INTRAVENOUS at 18:41

## 2017-08-06 RX ADMIN — NYSTATIN 500000 UNITS: 100000 SUSPENSION ORAL at 08:56

## 2017-08-06 RX ADMIN — Medication 0.76 MG: at 06:28

## 2017-08-06 RX ADMIN — PIPERACILLIN SODIUM,TAZOBACTAM SODIUM 3.38 G: 3; .375 INJECTION, POWDER, FOR SOLUTION INTRAVENOUS at 21:37

## 2017-08-06 RX ADMIN — NYSTATIN 500000 UNITS: 100000 SUSPENSION ORAL at 12:12

## 2017-08-06 RX ADMIN — SODIUM CHLORIDE 40 MG: 9 INJECTION INTRAMUSCULAR; INTRAVENOUS; SUBCUTANEOUS at 08:56

## 2017-08-06 RX ADMIN — Medication: at 12:09

## 2017-08-06 RX ADMIN — Medication: at 02:40

## 2017-08-06 RX ADMIN — PIPERACILLIN SODIUM,TAZOBACTAM SODIUM 3.38 G: 3; .375 INJECTION, POWDER, FOR SOLUTION INTRAVENOUS at 14:08

## 2017-08-06 RX ADMIN — ACETAMINOPHEN 650 MG: 325 TABLET, FILM COATED ORAL at 20:10

## 2017-08-06 RX ADMIN — NYSTATIN 500000 UNITS: 100000 SUSPENSION ORAL at 17:54

## 2017-08-06 RX ADMIN — CLOPIDOGREL BISULFATE 75 MG: 75 TABLET ORAL at 08:56

## 2017-08-06 RX ADMIN — Medication: at 20:10

## 2017-08-06 RX ADMIN — ONDANSETRON 8 MG: 2 INJECTION INTRAMUSCULAR; INTRAVENOUS at 02:38

## 2017-08-06 RX ADMIN — ASPIRIN 81 MG CHEWABLE TABLET 81 MG: 81 TABLET CHEWABLE at 08:56

## 2017-08-06 RX ADMIN — HYDROMORPHONE HYDROCHLORIDE 1 MG: 1 INJECTION, SOLUTION INTRAMUSCULAR; INTRAVENOUS; SUBCUTANEOUS at 02:39

## 2017-08-06 RX ADMIN — Medication 0.76 MG: at 14:07

## 2017-08-06 RX ADMIN — Medication 10 ML: at 06:28

## 2017-08-06 RX ADMIN — Medication 0.76 MG: at 21:37

## 2017-08-06 NOTE — PROGRESS NOTES
Bedside shift change report given to Donny  (oncoming nurse) by Marycruz De Leon (offgoing nurse). Report included the following information SBAR, Kardex, OR Summary, Procedure Summary, Intake/Output and Recent Results.

## 2017-08-06 NOTE — PROGRESS NOTES
ID Progress Note  2017    Subjective:     Problems with the wound vac over the weekend    Objective:     Antibiotics:  1. Zosyn     Vitals:   Visit Vitals    /75 (BP 1 Location: Left arm, BP Patient Position: At rest)    Pulse (!) 107    Temp 98 °F (36.7 °C)    Resp 18    Ht 5' 4\" (1.626 m)    Wt 157.7 kg (347 lb 10.7 oz)    SpO2 99%    Breastfeeding No    BMI 59.68 kg/m2        Tmax:  Temp (24hrs), Av.4 °F (36.9 °C), Min:98 °F (36.7 °C), Max:98.8 °F (37.1 °C)      Exam:  Lungs:  clear to auscultation bilaterally  Heart:  regularly irregular rhythm  Abdomen:  abnormal findings:  tenderness moderate in the entire abdomen, hypoactive bowel sounds    Labs:      Recent Labs      17   0243  17   0341   WBC  9.5  9.9   HGB  7.5*  7.6*   PLT  358  362   BUN  19  23*   CREA  0.69  0.78       Cultures:     Lab Results   Component Value Date/Time    Specimen Description: URINE 2009 07:45 PM     Lab Results   Component Value Date/Time    Culture result: NO GROWTH ON SOLID MEDIA AT 4 DAYS 2017 12:17 PM    Culture result:  2017 12:17 PM     **METHICILLIN RESISTANT STAPHYLOCOCCUS AUREUS**  ISOLATED FROM THIO BROTH ONLY      Culture result: NO GROWTH 5 DAYS 2017 02:35 PM       Radiology:     Line/Insert Date:           Assessment:     1. Ischemic gut with frozen abdomen  2. Leukocytosis--back up after surgery--trending back down overall--down to normal  3. Cultures negative to date    Objective:     1. Vancomycin and Eraxis stopped earlier  2. Follow up cultures and studies  3.  Work up fevers      Josef Mccoy MD

## 2017-08-06 NOTE — PROGRESS NOTES
Problem: Falls - Risk of  Goal: *Absence of falls  Outcome: Progressing Towards Goal  Patient is up walking the hallways with encouragement    Problem: Pressure Injury - Risk of  Goal: *Prevention of pressure ulcer  Outcome: Progressing Towards Goal  Patient on specialty bed; increasing activity; monitoring closely; Mother turns up the suction on the pure wick.   Mother was educated showing her the pure wick package; monitoring the suction

## 2017-08-06 NOTE — PROGRESS NOTES
Subjective  No new complaints  Has been up and ambulating   Pain under control     Temp:  [98 °F (36.7 °C)-99.4 °F (37.4 °C)]   Pulse (Heart Rate):  []   BP: ()/(51-88)   Resp Rate:  [15-20]   O2 Sat (%):  [96 %-100 %]   Weight:  [330 lb 0.5 oz (149.7 kg)-348 lb 1.7 oz (157.9 kg)]   08/06 0701 - 08/06 1900  In: 928.9 [P.O.:240; I.V.:688.9]  Out: 2350 [Urine:900; Drains:1450]  08/04 1901 - 08/06 0700  In: 92643.9 [I.V.:20389.9]  Out: Ganesh Countess [Urine:5900; Drains:5165]      Objective  Gen- Alert in NAD  Lungs- CTA  H- RRR  Abd- soft / vac in place and holding,    Active Problems:    Abdominal pain (6/25/2017)      Small bowel ischemia ([address] (6/28/2017)      Overview: CT abd/pelvis 6/28/17      1. The stomach and proximal small bowel loops are distended. There is a       loop of      small bowel in the midabdomen which is mildly dilated and does not       demonstrate      enhancement in the wall and there is suspicion of pneumatosis and this       leads to      a loop of small bowel which demonstrates thickening of the wall and       findings are      suspicious for ischemia. 2. There is extensive mesenteric edema and a small amount of ascites in       the      pelvis. 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 There is nonocclusive thrombus in the SMA. 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)          Assessment & Plan  Continue TPN  Vac change tomorrow.    ambulate

## 2017-08-06 NOTE — PROGRESS NOTES
Case  Manager spoke with patient's aunt and with patient who is slowly making progress. She has been out of bed and ambulating short distances with assistance. She was concerned about her wound vac malfunctioning yesterday. She did have a chance to speak with surgeon and nursing has spent time with her giving her emotional support. Patient is wondering if she would be able to just go home versus a rehab. She does have a good support system and we discussed briefly the potential for her to go home with home health. She has not made a decision about home health at this time.   Care management will continue to monitor for transitions of care needs,

## 2017-08-07 LAB
ANION GAP BLD CALC-SCNC: 6 MMOL/L (ref 5–15)
BASOPHILS # BLD AUTO: 0 K/UL (ref 0–0.1)
BASOPHILS # BLD: 0 % (ref 0–1)
BUN SERPL-MCNC: 20 MG/DL (ref 6–20)
BUN/CREAT SERPL: 29 (ref 12–20)
CALCIUM SERPL-MCNC: 8.6 MG/DL (ref 8.5–10.1)
CHLORIDE SERPL-SCNC: 106 MMOL/L (ref 97–108)
CO2 SERPL-SCNC: 27 MMOL/L (ref 21–32)
CREAT SERPL-MCNC: 0.7 MG/DL (ref 0.55–1.02)
EOSINOPHIL # BLD: 0.4 K/UL (ref 0–0.4)
EOSINOPHIL NFR BLD: 5 % (ref 0–7)
ERYTHROCYTE [DISTWIDTH] IN BLOOD BY AUTOMATED COUNT: 17.1 % (ref 11.5–14.5)
GLUCOSE BLD STRIP.AUTO-MCNC: 111 MG/DL (ref 65–100)
GLUCOSE BLD STRIP.AUTO-MCNC: 122 MG/DL (ref 65–100)
GLUCOSE BLD STRIP.AUTO-MCNC: 87 MG/DL (ref 65–100)
GLUCOSE BLD STRIP.AUTO-MCNC: 97 MG/DL (ref 65–100)
GLUCOSE SERPL-MCNC: 104 MG/DL (ref 65–100)
HCT VFR BLD AUTO: 23.3 % (ref 35–47)
HGB BLD-MCNC: 7.3 G/DL (ref 11.5–16)
LYMPHOCYTES # BLD AUTO: 36 % (ref 12–49)
LYMPHOCYTES # BLD: 2.6 K/UL (ref 0.8–3.5)
MAGNESIUM SERPL-MCNC: 2 MG/DL (ref 1.6–2.4)
MCH RBC QN AUTO: 29 PG (ref 26–34)
MCHC RBC AUTO-ENTMCNC: 31.3 G/DL (ref 30–36.5)
MCV RBC AUTO: 92.5 FL (ref 80–99)
MONOCYTES # BLD: 0.6 K/UL (ref 0–1)
MONOCYTES NFR BLD AUTO: 9 % (ref 5–13)
NEUTS SEG # BLD: 3.7 K/UL (ref 1.8–8)
NEUTS SEG NFR BLD AUTO: 50 % (ref 32–75)
PHOSPHATE SERPL-MCNC: 4.1 MG/DL (ref 2.6–4.7)
PLATELET # BLD AUTO: 360 K/UL (ref 150–400)
POTASSIUM SERPL-SCNC: 4 MMOL/L (ref 3.5–5.1)
RBC # BLD AUTO: 2.52 M/UL (ref 3.8–5.2)
SERVICE CMNT-IMP: ABNORMAL
SERVICE CMNT-IMP: ABNORMAL
SERVICE CMNT-IMP: NORMAL
SERVICE CMNT-IMP: NORMAL
SODIUM SERPL-SCNC: 139 MMOL/L (ref 136–145)
WBC # BLD AUTO: 7.3 K/UL (ref 3.6–11)

## 2017-08-07 PROCEDURE — 82962 GLUCOSE BLOOD TEST: CPT

## 2017-08-07 PROCEDURE — 74011250636 HC RX REV CODE- 250/636: Performed by: SURGERY

## 2017-08-07 PROCEDURE — 77030019952 HC CANSTR VAC ASST KCON -B

## 2017-08-07 PROCEDURE — 74011250636 HC RX REV CODE- 250/636: Performed by: NURSE PRACTITIONER

## 2017-08-07 PROCEDURE — 74011000250 HC RX REV CODE- 250: Performed by: SURGERY

## 2017-08-07 PROCEDURE — C9113 INJ PANTOPRAZOLE SODIUM, VIA: HCPCS | Performed by: SURGERY

## 2017-08-07 PROCEDURE — 97606 NEG PRS WND THER DME>50 SQCM: CPT

## 2017-08-07 PROCEDURE — 80048 BASIC METABOLIC PNL TOTAL CA: CPT | Performed by: SURGERY

## 2017-08-07 PROCEDURE — 74011000250 HC RX REV CODE- 250: Performed by: NURSE PRACTITIONER

## 2017-08-07 PROCEDURE — 84100 ASSAY OF PHOSPHORUS: CPT | Performed by: SURGERY

## 2017-08-07 PROCEDURE — 3331090002 HH PPS REVENUE DEBIT

## 2017-08-07 PROCEDURE — 74011250637 HC RX REV CODE- 250/637: Performed by: PHYSICIAN ASSISTANT

## 2017-08-07 PROCEDURE — 36415 COLL VENOUS BLD VENIPUNCTURE: CPT | Performed by: SURGERY

## 2017-08-07 PROCEDURE — 74011250636 HC RX REV CODE- 250/636: Performed by: EMERGENCY MEDICINE

## 2017-08-07 PROCEDURE — 85025 COMPLETE CBC W/AUTO DIFF WBC: CPT | Performed by: SURGERY

## 2017-08-07 PROCEDURE — 3331090001 HH PPS REVENUE CREDIT

## 2017-08-07 PROCEDURE — 74011000258 HC RX REV CODE- 258: Performed by: SURGERY

## 2017-08-07 PROCEDURE — 74011250636 HC RX REV CODE- 250/636: Performed by: PHYSICAL MEDICINE & REHABILITATION

## 2017-08-07 PROCEDURE — 74011636637 HC RX REV CODE- 636/637: Performed by: SURGERY

## 2017-08-07 PROCEDURE — 74011250637 HC RX REV CODE- 250/637: Performed by: SURGERY

## 2017-08-07 PROCEDURE — 97116 GAIT TRAINING THERAPY: CPT

## 2017-08-07 PROCEDURE — 77030018717 HC DRSG GRNUFM KCON -B

## 2017-08-07 PROCEDURE — 83735 ASSAY OF MAGNESIUM: CPT | Performed by: SURGERY

## 2017-08-07 PROCEDURE — 74011250636 HC RX REV CODE- 250/636: Performed by: INTERNAL MEDICINE

## 2017-08-07 PROCEDURE — 65660000000 HC RM CCU STEPDOWN

## 2017-08-07 RX ADMIN — Medication: at 15:34

## 2017-08-07 RX ADMIN — NYSTATIN 500000 UNITS: 100000 SUSPENSION ORAL at 08:45

## 2017-08-07 RX ADMIN — NYSTATIN 500000 UNITS: 100000 SUSPENSION ORAL at 18:56

## 2017-08-07 RX ADMIN — Medication 10 ML: at 19:07

## 2017-08-07 RX ADMIN — ASPIRIN 81 MG CHEWABLE TABLET 81 MG: 81 TABLET CHEWABLE at 08:44

## 2017-08-07 RX ADMIN — PIPERACILLIN SODIUM,TAZOBACTAM SODIUM 3.38 G: 3; .375 INJECTION, POWDER, FOR SOLUTION INTRAVENOUS at 05:23

## 2017-08-07 RX ADMIN — HYDROMORPHONE HYDROCHLORIDE 1 MG: 1 INJECTION, SOLUTION INTRAMUSCULAR; INTRAVENOUS; SUBCUTANEOUS at 09:28

## 2017-08-07 RX ADMIN — ONDANSETRON 8 MG: 2 INJECTION INTRAMUSCULAR; INTRAVENOUS at 09:28

## 2017-08-07 RX ADMIN — SODIUM CHLORIDE, SODIUM LACTATE, POTASSIUM CHLORIDE, AND CALCIUM CHLORIDE 25 ML/HR: 600; 310; 30; 20 INJECTION, SOLUTION INTRAVENOUS at 08:10

## 2017-08-07 RX ADMIN — HYDROMORPHONE HYDROCHLORIDE 1 MG: 1 INJECTION, SOLUTION INTRAMUSCULAR; INTRAVENOUS; SUBCUTANEOUS at 00:04

## 2017-08-07 RX ADMIN — Medication 10 ML: at 05:23

## 2017-08-07 RX ADMIN — HYDROMORPHONE HYDROCHLORIDE 1 MG: 1 INJECTION, SOLUTION INTRAMUSCULAR; INTRAVENOUS; SUBCUTANEOUS at 18:55

## 2017-08-07 RX ADMIN — HYDROMORPHONE HYDROCHLORIDE 1 MG: 1 INJECTION, SOLUTION INTRAMUSCULAR; INTRAVENOUS; SUBCUTANEOUS at 06:05

## 2017-08-07 RX ADMIN — ONDANSETRON 8 MG: 2 INJECTION INTRAMUSCULAR; INTRAVENOUS at 21:45

## 2017-08-07 RX ADMIN — Medication 0.76 MG: at 05:23

## 2017-08-07 RX ADMIN — Medication: at 23:39

## 2017-08-07 RX ADMIN — Medication: at 06:04

## 2017-08-07 RX ADMIN — Medication 10 ML: at 05:24

## 2017-08-07 RX ADMIN — Medication 10 ML: at 14:18

## 2017-08-07 RX ADMIN — Medication 10 ML: at 21:32

## 2017-08-07 RX ADMIN — CLOPIDOGREL BISULFATE 75 MG: 75 TABLET ORAL at 08:44

## 2017-08-07 RX ADMIN — NYSTATIN 500000 UNITS: 100000 SUSPENSION ORAL at 21:32

## 2017-08-07 RX ADMIN — I.V. FAT EMULSION 500 ML: 20 EMULSION INTRAVENOUS at 18:57

## 2017-08-07 RX ADMIN — POTASSIUM CHLORIDE: 2 INJECTION, SOLUTION, CONCENTRATE INTRAVENOUS at 18:57

## 2017-08-07 RX ADMIN — Medication 0.76 MG: at 21:32

## 2017-08-07 RX ADMIN — Medication 0.76 MG: at 14:18

## 2017-08-07 RX ADMIN — HYDROMORPHONE HYDROCHLORIDE 1 MG: 1 INJECTION, SOLUTION INTRAMUSCULAR; INTRAVENOUS; SUBCUTANEOUS at 14:20

## 2017-08-07 RX ADMIN — NYSTATIN 500000 UNITS: 100000 SUSPENSION ORAL at 14:18

## 2017-08-07 RX ADMIN — SODIUM CHLORIDE 40 MG: 9 INJECTION INTRAMUSCULAR; INTRAVENOUS; SUBCUTANEOUS at 08:45

## 2017-08-07 RX ADMIN — HYDROMORPHONE HYDROCHLORIDE 1 MG: 1 INJECTION, SOLUTION INTRAMUSCULAR; INTRAVENOUS; SUBCUTANEOUS at 15:14

## 2017-08-07 NOTE — WOUND CARE
WOCN Note:     Follow-up visit for NPWT to abdomen.      Chart shows:  Admitted for nausea, vomiting, and abdominal pain; history of Crohn's, DVT, bowel perforation with surgery 6/7/17. Exploratory lap, KATHRINE, fistula exclusion and tube placement by Dr. Davalos Comes 7/7/17.      Assessment:   Patient is A&O x4 and mobile around room.    Bed: total care bariatric sport  Patient using PureWick.    Diet: NPO with sips & TPN  Pre-medicated for pain by RN and using PCA throughout visit.      Mucus fistula LLQ: red & moist and almost at skin level; some mucosal transplantation noted; output is light green mucus. Activelife flat pouch placed. LUQ red rubber catheter to bedside bag. Little output in bag. The insertion of this catheter is seen on the left margin of wound bed into proximal lumen of small intestine. Effluent leaks around cath insertion. Insertion site of Malecot drain (now removed) in RUQ = 1 x 1 x 0.8 cm. Red moist wound bed. Packed with moist gauze. Rabia drain in RLQ with it's end resting in wound bed. Trimmed today to keep within wound margins. Seen with Dr. Brenda Terrazas today.       1. Midline abdominal dehisced incision = 14 x 7.5 x 3 cm (remarkably smaller).   Ana. Base is 60% moist red mucosal tissue with peristalsis, 5% scattered moist yellow, 35% moist pink with some mucus. Dressed using 1 piece of petroleum gauze folded over to create several layer to protect bowel and 1 black sponge. Sharran Espino drain rests between petroleum gauze and black sponge. Rabia drain to wall suction. VAC @ 125 mmHg continuous suction. Recommendations:    Maintain VAC as ordered. Discussed above plan with patient & RN. Transition of Care: Plan to follow as needed while admitted to hospital with [facility] changes on Mondays and Thursdays.      SONY Lemons, RN, [facility]  Certified Wound, Ostomy, Continence Nurse  office [phone]  pager [phone] or call  to page

## 2017-08-07 NOTE — PROGRESS NOTES
[facility]    Subjective     Doing well, getting [address] changed, on TPN, sips    Objective     Patient Vitals for the past 24 hrs:   Temp Pulse Resp BP SpO2   08/07/17 0747 98.4 °F (36.9 °C) 95 18 104/59 100 %   08/07/17 0413 98.6 °F (37 °C) (!) 103 16 117/53 99 %   08/06/17 2251 98.4 °F (36.9 °C) 99 18 117/65 99 %   08/06/17 1905 98.5 °F (36.9 °C) 99 18 120/64 100 %   08/06/17 1552 98.4 °F (36.9 °C) (!) 103 20 130/73 100 %   08/06/17 1153 98 °F (36.7 °C) (!) 107 18 111/75 99 %         Date 08/06/17 0700 - 08/07/17 0659 08/07/17 0700 - 08/08/17 0659   Shift 8851-5732 5793-2746 24 Hour Total 8734-0231 1604-3711 24 Hour Total   I  N  T  A  K  E   P.O. 240 240 480         P. O. 240 240 480       I.V.  (mL/kg/hr) 688.9  (0.4) 2347.1  (1.2) 3036  (0.8)         Volume (lactated Ringers infusion) 220.8 408.3 629.2         Volume (piperacillin-tazobactam (ZOSYN) 3.375 g in 0.9% sodium chloride (MBP/ADV) 100 mL) 100 300 400         Volume (fat emulsion 20% (LIPOSYN, INTRALIPID) infusion 500 mL) 368.1 729.2 1097.3         Volume (TPN ADULT - CENTRAL)  909.5 909.5       Shift Total  (mL/kg) 928.9  (5.9) 2587.1  (16.4) 3516  (22.3)      O  U  T  P  U  T   Urine  (mL/kg/hr) 1200  (0.6) 1250  (0.7) 2450  (0.6) 800  800      Urine Output (mL) (External Female Catheter 07/12/17) 1200 1250 2450 800  800    Drains 3050 1500 4550 300  300      Output (ml) (Malecot Drain (Abdominal Drainage) 07/07/17)  300  300      Output (ml) Bethene Neth Drain 07/07/17 Right Abdomen) 6322 368 8232         Output (ml) (Vacuum Assisted Closure Mid Abdominal) 450 300 750       Stool 0 10 10         Output (ml) (Ileostomy 06/07/00 Lower  Abdomen) 0 10 10       Shift Total  (mL/kg) 4250  (27) 2760  (17.5) 7010  (44.5) 1100  (7)  1100  (7)   NET -3321.1 -172.9 -3494 -1100  -1100   Weight (kg) 157.7 157.4 157.4 157.4 157.4 157.4       PE  Pulm - CTAB  CV - RRR  Abd - soft, ND, BS present, wound much smaller, good granulation tissue present, in superior and inferior portion of the wounds and with bowel in the middle, Red catheter in R L side proximal bowel, minimal output from bowel on R side and inferior    Labs  Recent Results (from the past 12 hour(s))   GLUCOSE, POC    Collection Time: 08/06/17 11:42 PM   Result Value Ref Range    Glucose (POC) 113 (H) 65 - 100 mg/dL    Performed by ALBA SALGUERO(CON)    GLUCOSE, POC    Collection Time: 08/07/17  5:22 AM   Result Value Ref Range    Glucose (POC) 122 (H) 65 - 100 mg/dL    Performed by Jae Fernandes    CBC WITH AUTOMATED DIFF    Collection Time: 08/07/17  5:31 AM   Result Value Ref Range    WBC 7.3 3.6 - 11.0 K/uL    RBC 2.52 (L) 3.80 - 5.20 M/uL    HGB 7.3 (L) 11.5 - 16.0 g/dL    HCT 23.3 (L) 35.0 - 47.0 %    MCV 92.5 80.0 - 99.0 FL    MCH 29.0 26.0 - 34.0 PG    MCHC 31.3 30.0 - 36.5 g/dL    RDW 17.1 (H) 11.5 - 14.5 %    PLATELET 299 451 - 042 K/uL    NEUTROPHILS 50 32 - 75 %    LYMPHOCYTES 36 12 - 49 %    MONOCYTES 9 5 - 13 %    EOSINOPHILS 5 0 - 7 %    BASOPHILS 0 0 - 1 %    ABS. NEUTROPHILS 3.7 1.8 - 8.0 K/UL    ABS. LYMPHOCYTES 2.6 0.8 - 3.5 K/UL    ABS. MONOCYTES 0.6 0.0 - 1.0 K/UL    ABS. EOSINOPHILS 0.4 0.0 - 0.4 K/UL    ABS.  BASOPHILS 0.0 0.0 - 0.1 K/UL   METABOLIC PANEL, BASIC    Collection Time: 08/07/17  5:31 AM   Result Value Ref Range    Sodium 139 136 - 145 mmol/L    Potassium 4.0 3.5 - 5.1 mmol/L    Chloride 106 97 - 108 mmol/L    CO2 27 21 - 32 mmol/L    Anion gap 6 5 - 15 mmol/L    Glucose 104 (H) 65 - 100 mg/dL    BUN 20 6 - 20 MG/DL    Creatinine 0.70 0.55 - 1.02 MG/DL    BUN/Creatinine ratio 29 (H) 12 - 20      GFR est AA >60 >60 ml/min/1.73m2    GFR est non-AA >60 >60 ml/min/1.73m2    Calcium 8.6 8.5 - 10.1 MG/DL   MAGNESIUM    Collection Time: 08/07/17  5:31 AM   Result Value Ref Range    Magnesium 2.0 1.6 - 2.4 mg/dL   PHOSPHORUS    Collection Time: 08/07/17  5:31 AM   Result Value Ref Range    Phosphorus 4.1 2.6 - 4.7 MG/DL         Plan     WV change seen at the bedside, the wound is getting smaller, but still needs the wound vac and more time, but over all improved  Cont TPN, renewed  OOB more today  NPO with maricruz Mcqueen MD

## 2017-08-07 NOTE — PROGRESS NOTES
Bedside and Verbal shift change report given to Rafal (oncoming nurse) by Graham Myers (offgoing nurse). Report included the following information SBAR, OR Summary, Procedure Summary, Intake/Output, MAR, Recent Results, Med Rec Status and Cardiac Rhythm Normal Sinus /Sinus Tach.

## 2017-08-07 NOTE — PROGRESS NOTES
Problem: Falls - Risk of  Goal: *Absence of falls  Outcome: Progressing Towards Goal  Patient will not fall during hospitalization. Patient encouraged to call for needed assistance. Vitals signs remain stable and telemetry intact. Bed in lowest locked position. Call bell within reach. Will continue to monitor.

## 2017-08-07 NOTE — PROGRESS NOTES
is familiar with case and I spke with Matthew Lowery wound care nurse and she was able to obtain a good seal on patient's wound. She remains on nightly TPN and per conversation very weak. She has been taking ice chips and is severely de-conditioned. Patient is not quite ready for discharge and may not be able to manage at home depending on the progress she makes this week. Patient has been open to [facility] and hopefully she may not need TPN at home. Her mom is Rafael Rivas and her ph is [phone]. Will follow for discharge needs which will include a wound vac. Most likely.

## 2017-08-07 NOTE — PROGRESS NOTES
physical Therapy TREATMENT  Patient: Wayne Dalton (46 y.o. female)  Date: 8/7/2017  Diagnosis: N/V intractable post-opt pain  Abdominal pain  poor iv access  DEAD BOWEL  SMALL BOWEL OBSTRUCTION  central line placement  Abdominal pain <principal problem not specified>  Procedure(s) (LRB):  SPECIAL PROCEDURE OUTSIDE OF OR (N/A) 25 Days Post-Op  Precautions:    Chart, physical therapy assessment, plan of care and goals were reviewed. ASSESSMENT:  Chart reviewed and clearance received from nursing for treatment; therapy given permission to remove suction for ambulation. Pt received supine in bed and agreeable to OOB mobility with therapy. Pt able to move from supine<>sit with MinAx1 for trunk clearance due to decreased abdominal strength. Pt completed sit<>stand CGAx1 and ambulated 200ft with overall CGAx1 plus additional assist for line management (verbal cues provided for activity pacing). This treatment session, pt able to ambulate without supportive surface but continues to demonstrate an antalgic gait pattern with decreased step clearance (pt holding stomach for comfort). Pt returned to room after complaint of \"wooziness\" and seated in chair. Pt positioned to comfort with all needs in reach and drain placed back on suction. Nursing notified of pt's status. Progression toward goals:  [x]      Improving appropriately and progressing toward goals  []      Improving slowly and progressing toward goals  []      Not making progress toward goals and plan of care will be adjusted     PLAN:  Patient continues to benefit from skilled intervention to address the above impairments. Continue treatment per established plan of care. Discharge Recommendations:  Possible discharge with HHPT pending pt's progress  Further Equipment Recommendations for Discharge:  None     SUBJECTIVE:   Patient stated I am limited by time for walking.  (Pt referring to amount of time off of suction)    OBJECTIVE DATA SUMMARY: Critical Behavior:  Neurologic State: Alert  Orientation Level: Oriented to time  Cognition: Appropriate decision making, Appropriate for age attention/concentration, Appropriate safety awareness, Follows commands     Functional Mobility Training:  Bed Mobility:     Supine to Sit: Minimum assistance;Assist x1               Transfers:  Sit to Stand: Contact guard assistance;Assist x1  Stand to Sit: Contact guard assistance;Assist x1                             Balance:  Sitting: Intact  Standing: Impaired  Standing - Static: Good  Standing - Dynamic : Good  Ambulation/Gait Training:  Distance (ft): 200 Feet (ft)  Assistive Device: Gait belt  Ambulation - Level of Assistance: Contact guard assistance;Assist x1        Gait Abnormalities: Antalgic;Decreased step clearance        Base of Support: Widened     Speed/Liz: Pace decreased (<100 feet/min)  Step Length: Left shortened;Right shortened                 Pain:  Pain Scale 1: Numeric (0 - 10)  Pain Intensity 1: 5  Pain Location 1: Abdomen  Pain Orientation 1: Anterior; Lower;Mid  Pain Description 1: Aching; Sharp  Pain Intervention(s) 1: Medication (see MAR); Encouraged PCA  Activity Tolerance:   Good  Please refer to the flowsheet for vital signs taken during this treatment.   After treatment:   [x] Patient left in no apparent distress sitting up in chair  [] Patient left in no apparent distress in bed  [x] Call bell left within reach  [x] Nursing notified  [] Caregiver present  [] Bed alarm activated    COMMUNICATION/COLLABORATION:   The patients plan of care was discussed with: Registered Nurse    Basil Acosta   Time Calculation: 21 mins

## 2017-08-07 NOTE — PROGRESS NOTES
ID Progress Note  2017    Subjective:     Problems with the wound vac over the weekend    Objective:     Antibiotics:  1. Zosyn     Vitals:   Visit Vitals    /66 (BP 1 Location: Left arm, BP Patient Position: At rest)    Pulse 97    Temp 98.3 °F (36.8 °C)    Resp 18    Ht 5' 4\" (1.626 m)    Wt 157.4 kg (347 lb 0.1 oz)    SpO2 98%    Breastfeeding No    BMI 59.56 kg/m2        Tmax:  Temp (24hrs), Av.4 °F (36.9 °C), Min:98.3 °F (36.8 °C), Max:98.6 °F (37 °C)      Exam:  Lungs:  clear to auscultation bilaterally  Heart:  regularly irregular rhythm  Abdomen:  abnormal findings:  tenderness moderate in the entire abdomen, hypoactive bowel sounds    Labs:      Recent Labs      17   0531  17   0243   WBC  7.3  9.5   HGB  7.3*  7.5*   PLT  360  358   BUN  20  19   CREA  0.70  0.69       Cultures:     Lab Results   Component Value Date/Time    Specimen Description: URINE 2009 07:45 PM     Lab Results   Component Value Date/Time    Culture result: NO GROWTH ON SOLID MEDIA AT 4 DAYS 2017 12:17 PM    Culture result:  2017 12:17 PM     **METHICILLIN RESISTANT STAPHYLOCOCCUS AUREUS**  ISOLATED FROM THIO BROTH ONLY      Culture result: NO GROWTH 5 DAYS 2017 02:35 PM       Radiology:     Line/Insert Date:           Assessment:     1. Ischemic gut with frozen abdomen  2. Leukocytosis--back up after surgery--trending back down overall--down to normal  3. Cultures negative to date    Objective:     1.  Stop antibiotics altogether and monitor      Raven Kelsey MD

## 2017-08-07 NOTE — PROGRESS NOTES
Interdisciplinary team rounds were held 8/7/2017 with the following team members:Care Management, Nursing, Nutrition, Occupational Therapy, Patient Relations, Clinical Coordinator and CCL and the patient. Plan of care discussed. See clinical pathway and/or care plan for interventions and desired outcomes.

## 2017-08-07 NOTE — PROGRESS NOTES
Bedside and Verbal shift change report given to Isreal Garcia RN (oncoming nurse) by Inez Smith (offgoing nurse). Report included the following information SBAR, Kardex and Recent Results.

## 2017-08-08 LAB
ANION GAP BLD CALC-SCNC: 8 MMOL/L (ref 5–15)
BASOPHILS # BLD AUTO: 0 K/UL (ref 0–0.1)
BASOPHILS # BLD: 0 % (ref 0–1)
BUN SERPL-MCNC: 18 MG/DL (ref 6–20)
BUN/CREAT SERPL: 27 (ref 12–20)
CALCIUM SERPL-MCNC: 8.8 MG/DL (ref 8.5–10.1)
CHLORIDE SERPL-SCNC: 107 MMOL/L (ref 97–108)
CO2 SERPL-SCNC: 25 MMOL/L (ref 21–32)
CREAT SERPL-MCNC: 0.66 MG/DL (ref 0.55–1.02)
EOSINOPHIL # BLD: 0.4 K/UL (ref 0–0.4)
EOSINOPHIL NFR BLD: 5 % (ref 0–7)
ERYTHROCYTE [DISTWIDTH] IN BLOOD BY AUTOMATED COUNT: 17.4 % (ref 11.5–14.5)
GLUCOSE BLD STRIP.AUTO-MCNC: 122 MG/DL (ref 65–100)
GLUCOSE BLD STRIP.AUTO-MCNC: 123 MG/DL (ref 65–100)
GLUCOSE BLD STRIP.AUTO-MCNC: 90 MG/DL (ref 65–100)
GLUCOSE BLD STRIP.AUTO-MCNC: 91 MG/DL (ref 65–100)
GLUCOSE SERPL-MCNC: 108 MG/DL (ref 65–100)
HCT VFR BLD AUTO: 23.2 % (ref 35–47)
HGB BLD-MCNC: 7.3 G/DL (ref 11.5–16)
LYMPHOCYTES # BLD AUTO: 37 % (ref 12–49)
LYMPHOCYTES # BLD: 3.2 K/UL (ref 0.8–3.5)
MAGNESIUM SERPL-MCNC: 1.9 MG/DL (ref 1.6–2.4)
MCH RBC QN AUTO: 29.1 PG (ref 26–34)
MCHC RBC AUTO-ENTMCNC: 31.5 G/DL (ref 30–36.5)
MCV RBC AUTO: 92.4 FL (ref 80–99)
MONOCYTES # BLD: 0.7 K/UL (ref 0–1)
MONOCYTES NFR BLD AUTO: 8 % (ref 5–13)
NEUTS SEG # BLD: 4.4 K/UL (ref 1.8–8)
NEUTS SEG NFR BLD AUTO: 50 % (ref 32–75)
PHOSPHATE SERPL-MCNC: 3.6 MG/DL (ref 2.6–4.7)
PLATELET # BLD AUTO: 398 K/UL (ref 150–400)
POTASSIUM SERPL-SCNC: 4.2 MMOL/L (ref 3.5–5.1)
RBC # BLD AUTO: 2.51 M/UL (ref 3.8–5.2)
SERVICE CMNT-IMP: ABNORMAL
SERVICE CMNT-IMP: ABNORMAL
SERVICE CMNT-IMP: NORMAL
SERVICE CMNT-IMP: NORMAL
SODIUM SERPL-SCNC: 140 MMOL/L (ref 136–145)
WBC # BLD AUTO: 8.7 K/UL (ref 3.6–11)

## 2017-08-08 PROCEDURE — 74011250637 HC RX REV CODE- 250/637: Performed by: PHYSICIAN ASSISTANT

## 2017-08-08 PROCEDURE — 74011250636 HC RX REV CODE- 250/636: Performed by: PHYSICAL MEDICINE & REHABILITATION

## 2017-08-08 PROCEDURE — 80048 BASIC METABOLIC PNL TOTAL CA: CPT | Performed by: SURGERY

## 2017-08-08 PROCEDURE — 74011250636 HC RX REV CODE- 250/636: Performed by: EMERGENCY MEDICINE

## 2017-08-08 PROCEDURE — 74011250636 HC RX REV CODE- 250/636: Performed by: INTERNAL MEDICINE

## 2017-08-08 PROCEDURE — 3331090001 HH PPS REVENUE CREDIT

## 2017-08-08 PROCEDURE — C9113 INJ PANTOPRAZOLE SODIUM, VIA: HCPCS | Performed by: SURGERY

## 2017-08-08 PROCEDURE — 74011000250 HC RX REV CODE- 250: Performed by: SURGERY

## 2017-08-08 PROCEDURE — 74011636637 HC RX REV CODE- 636/637: Performed by: SURGERY

## 2017-08-08 PROCEDURE — 36415 COLL VENOUS BLD VENIPUNCTURE: CPT | Performed by: SURGERY

## 2017-08-08 PROCEDURE — 3331090002 HH PPS REVENUE DEBIT

## 2017-08-08 PROCEDURE — 85025 COMPLETE CBC W/AUTO DIFF WBC: CPT | Performed by: SURGERY

## 2017-08-08 PROCEDURE — 74011250636 HC RX REV CODE- 250/636: Performed by: SURGERY

## 2017-08-08 PROCEDURE — 65660000000 HC RM CCU STEPDOWN

## 2017-08-08 PROCEDURE — 74011250637 HC RX REV CODE- 250/637: Performed by: SURGERY

## 2017-08-08 PROCEDURE — 82962 GLUCOSE BLOOD TEST: CPT

## 2017-08-08 PROCEDURE — 74011250636 HC RX REV CODE- 250/636: Performed by: NURSE PRACTITIONER

## 2017-08-08 PROCEDURE — 83735 ASSAY OF MAGNESIUM: CPT | Performed by: SURGERY

## 2017-08-08 PROCEDURE — 84100 ASSAY OF PHOSPHORUS: CPT | Performed by: SURGERY

## 2017-08-08 PROCEDURE — 74011000250 HC RX REV CODE- 250: Performed by: INTERNAL MEDICINE

## 2017-08-08 PROCEDURE — 74011000258 HC RX REV CODE- 258: Performed by: SURGERY

## 2017-08-08 RX ORDER — PANTOPRAZOLE SODIUM 40 MG/1
40 TABLET, DELAYED RELEASE ORAL
Status: DISCONTINUED | OUTPATIENT
Start: 2017-08-09 | End: 2017-08-24 | Stop reason: HOSPADM

## 2017-08-08 RX ADMIN — HYDROMORPHONE HYDROCHLORIDE 1 MG: 1 INJECTION, SOLUTION INTRAMUSCULAR; INTRAVENOUS; SUBCUTANEOUS at 22:09

## 2017-08-08 RX ADMIN — Medication: at 18:42

## 2017-08-08 RX ADMIN — Medication 10 ML: at 21:05

## 2017-08-08 RX ADMIN — Medication 10 ML: at 13:23

## 2017-08-08 RX ADMIN — Medication 0.76 MG: at 21:05

## 2017-08-08 RX ADMIN — NYSTATIN 500000 UNITS: 100000 SUSPENSION ORAL at 09:26

## 2017-08-08 RX ADMIN — ASPIRIN 81 MG CHEWABLE TABLET 81 MG: 81 TABLET CHEWABLE at 09:26

## 2017-08-08 RX ADMIN — CLOPIDOGREL BISULFATE 75 MG: 75 TABLET ORAL at 09:26

## 2017-08-08 RX ADMIN — HYDROMORPHONE HYDROCHLORIDE 1 MG: 1 INJECTION, SOLUTION INTRAMUSCULAR; INTRAVENOUS; SUBCUTANEOUS at 16:31

## 2017-08-08 RX ADMIN — Medication: at 19:53

## 2017-08-08 RX ADMIN — HYDROMORPHONE HYDROCHLORIDE 1 MG: 1 INJECTION, SOLUTION INTRAMUSCULAR; INTRAVENOUS; SUBCUTANEOUS at 13:19

## 2017-08-08 RX ADMIN — NYSTATIN 500000 UNITS: 100000 SUSPENSION ORAL at 13:19

## 2017-08-08 RX ADMIN — SODIUM CHLORIDE 5 MG: 9 INJECTION INTRAMUSCULAR; INTRAVENOUS; SUBCUTANEOUS at 09:36

## 2017-08-08 RX ADMIN — Medication 10 ML: at 05:08

## 2017-08-08 RX ADMIN — ONDANSETRON 8 MG: 2 INJECTION INTRAMUSCULAR; INTRAVENOUS at 21:05

## 2017-08-08 RX ADMIN — NYSTATIN 500000 UNITS: 100000 SUSPENSION ORAL at 18:36

## 2017-08-08 RX ADMIN — Medication 10 ML: at 18:36

## 2017-08-08 RX ADMIN — SODIUM CHLORIDE 40 MG: 9 INJECTION INTRAMUSCULAR; INTRAVENOUS; SUBCUTANEOUS at 09:26

## 2017-08-08 RX ADMIN — Medication 0.76 MG: at 13:19

## 2017-08-08 RX ADMIN — ACETAMINOPHEN 650 MG: 325 TABLET, FILM COATED ORAL at 09:36

## 2017-08-08 RX ADMIN — Medication: at 10:12

## 2017-08-08 RX ADMIN — Medication 0.76 MG: at 05:07

## 2017-08-08 RX ADMIN — POTASSIUM CHLORIDE: 2 INJECTION, SOLUTION, CONCENTRATE INTRAVENOUS at 18:43

## 2017-08-08 RX ADMIN — ONDANSETRON 8 MG: 2 INJECTION INTRAMUSCULAR; INTRAVENOUS at 04:33

## 2017-08-08 RX ADMIN — ONDANSETRON 8 MG: 2 INJECTION INTRAMUSCULAR; INTRAVENOUS at 10:36

## 2017-08-08 NOTE — PROGRESS NOTES
Subjective  No new complaints     Temp:  [98 °F (36.7 °C)-98.8 °F (37.1 °C)]   Pulse (Heart Rate):  []   BP: (104-144)/(50-88)   Resp Rate:  [15-20]   O2 Sat (%):  [97 %-100 %]   Weight:  [347 lb 0.1 oz (157.4 kg)-347 lb 10.7 oz (157.7 kg)]      08/06 1901 - 08/08 0700  In: 5042.3 [P.O.:480; I.V.:4562.3]  Out: 8185 [Urine:4400; Drains:3775]      Objective  Gen- Alert in NAD  Lungs- CTA  H- RRR   Abd- S/ mild tenderness. + BS  Vac in place  Red rubber - still with output      Active Problems:    Abdominal pain (6/25/2017)      Small bowel ischemia ([address] (6/28/2017)      Overview: CT abd/pelvis 6/28/17      1. The stomach and proximal small bowel loops are distended. There is a       loop of      small bowel in the midabdomen which is mildly dilated and does not       demonstrate      enhancement in the wall and there is suspicion of pneumatosis and this       leads to      a loop of small bowel which demonstrates thickening of the wall and       findings are      suspicious for ischemia. 2. There is extensive mesenteric edema and a small amount of ascites in       the      pelvis. 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 There is nonocclusive thrombus in the SMA.             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

## 2017-08-08 NOTE — PROGRESS NOTES
Bedside and Verbal shift change report given to MAURI Aleman (oncoming nurse) by Ara Keating RN (offgoing nurse). Report included the following information SBAR, Kardex, Intake/Output and Cardiac Rhythm , sinus tach.

## 2017-08-08 NOTE — PROGRESS NOTES
Problem: Pressure Injury - Risk of  Goal: *Prevention of pressure ulcer  Outcome: Progressing Towards Goal  Patient will not acquire any hospital related skin breakdown. Patient encouraged to change positions frequently to alleviate pressure to sacrum. Patient turns with assist.   Patient ambulatory during dayshift. No signs of skin breakdown present. Patient skin will be monitored throughout shift for changes. Patient skin remains clean, dry, and intact with the exception to drains and incision. Call bell within reach. Will continue to monitor.

## 2017-08-08 NOTE — PROGRESS NOTES
Clinical Pharmacy Note: IV to PO Automatic Conversion  Please note: Nick Rubi medication(s) (pantoprazole) has/have been changed from IV to PO based on the following critiera:    - Patient is taking scheduled oral medications  - Patient is tolerating tube feeds at goal rate or a full liquid, soft or regular diet (no; but per IV pantoprazole shortage management plan patient's tolerated scheduled PO medications will be converted to PO pantoprazole). This IV to PO conversion is based on the [employer] approved automatic conversion policy for eligible patients. Please call with questions.

## 2017-08-08 NOTE — PROGRESS NOTES
Bedside shift change report given to Deborah Lyn RN (oncoming nurse) by Darrin Graham RN (offgoing nurse). Report included the following information SBAR, Kardex, Procedure Summary, MAR and Cardiac Rhythm NSR.

## 2017-08-08 NOTE — PROGRESS NOTES
ID Progress Note  2017    Subjective:     Problems with the wound vac over the weekend    Objective:     Antibiotics:  1. Zosyn     Vitals:   Visit Vitals    BP 91/61 (BP 1 Location: Left arm, BP Patient Position: At rest)    Pulse (!) 102    Temp 98.4 °F (36.9 °C)    Resp 20    Ht 5' 4\" (1.626 m)    Wt 157.6 kg (347 lb 7.1 oz)    SpO2 99%    Breastfeeding No    BMI 59.64 kg/m2        Tmax:  Temp (24hrs), Av.5 °F (36.9 °C), Min:98.3 °F (36.8 °C), Max:98.7 °F (37.1 °C)      Exam:  Lungs:  clear to auscultation bilaterally  Heart:  regularly irregular rhythm  Abdomen:  abnormal findings:  tenderness moderate in the entire abdomen, hypoactive bowel sounds    Labs:      Recent Labs      17   0439  17   0531  17   0243   WBC  8.7  7.3  9.5   HGB  7.3*  7.3*  7.5*   PLT  398  360  358   BUN  18  20  19   CREA  0.66  0.70  0.69       Cultures:     Lab Results   Component Value Date/Time    Specimen Description: URINE 2009 07:45 PM     Lab Results   Component Value Date/Time    Culture result: NO GROWTH ON SOLID MEDIA AT 4 DAYS 2017 12:17 PM    Culture result:  2017 12:17 PM     **METHICILLIN RESISTANT STAPHYLOCOCCUS AUREUS**  ISOLATED FROM THIO BROTH ONLY      Culture result: NO GROWTH 5 DAYS 2017 02:35 PM       Radiology:     Line/Insert Date:           Assessment:     1. Ischemic gut with frozen abdomen  2. Leukocytosis--back up after surgery--trending back down overall--down to normal  3. Cultures negative to date    Objective:     1.  Monitor off antibiotics      Taylor Schmitt MD

## 2017-08-08 NOTE — PROGRESS NOTES
Physical Therapy  8.8.17    Chart reviewed. Spoke with RN, who stated patient just received Ativan and not appropriate for therapy at this time due to drowsiness. F/u later as able/appropriate. If PT is not able to f/u until tomorrow, recommend patient mobilize OOB with RN when appropriate to chair 1-2x this evening in order to build endurance for OOB and prevent further disease. Per previous therapy notes, patient able to mobilize to chair with assist x1. Thank you.     Kojo Gordon, PT, DPT

## 2017-08-09 LAB
ANION GAP BLD CALC-SCNC: 8 MMOL/L (ref 5–15)
BASOPHILS # BLD AUTO: 0 K/UL (ref 0–0.1)
BASOPHILS # BLD: 0 % (ref 0–1)
BUN SERPL-MCNC: 19 MG/DL (ref 6–20)
BUN/CREAT SERPL: 30 (ref 12–20)
CALCIUM SERPL-MCNC: 8.9 MG/DL (ref 8.5–10.1)
CHLORIDE SERPL-SCNC: 107 MMOL/L (ref 97–108)
CO2 SERPL-SCNC: 25 MMOL/L (ref 21–32)
CREAT SERPL-MCNC: 0.63 MG/DL (ref 0.55–1.02)
EOSINOPHIL # BLD: 0.3 K/UL (ref 0–0.4)
EOSINOPHIL NFR BLD: 4 % (ref 0–7)
ERYTHROCYTE [DISTWIDTH] IN BLOOD BY AUTOMATED COUNT: 17.2 % (ref 11.5–14.5)
GLUCOSE BLD STRIP.AUTO-MCNC: 121 MG/DL (ref 65–100)
GLUCOSE BLD STRIP.AUTO-MCNC: 124 MG/DL (ref 65–100)
GLUCOSE BLD STRIP.AUTO-MCNC: 93 MG/DL (ref 65–100)
GLUCOSE SERPL-MCNC: 94 MG/DL (ref 65–100)
HCT VFR BLD AUTO: 23.8 % (ref 35–47)
HGB BLD-MCNC: 7.4 G/DL (ref 11.5–16)
LYMPHOCYTES # BLD AUTO: 42 % (ref 12–49)
LYMPHOCYTES # BLD: 3.6 K/UL (ref 0.8–3.5)
MAGNESIUM SERPL-MCNC: 1.9 MG/DL (ref 1.6–2.4)
MCH RBC QN AUTO: 28.7 PG (ref 26–34)
MCHC RBC AUTO-ENTMCNC: 31.1 G/DL (ref 30–36.5)
MCV RBC AUTO: 92.2 FL (ref 80–99)
MONOCYTES # BLD: 0.7 K/UL (ref 0–1)
MONOCYTES NFR BLD AUTO: 8 % (ref 5–13)
NEUTS SEG # BLD: 4 K/UL (ref 1.8–8)
NEUTS SEG NFR BLD AUTO: 46 % (ref 32–75)
PHOSPHATE SERPL-MCNC: 4 MG/DL (ref 2.6–4.7)
PLATELET # BLD AUTO: 416 K/UL (ref 150–400)
POTASSIUM SERPL-SCNC: 4.3 MMOL/L (ref 3.5–5.1)
RBC # BLD AUTO: 2.58 M/UL (ref 3.8–5.2)
SERVICE CMNT-IMP: ABNORMAL
SERVICE CMNT-IMP: ABNORMAL
SERVICE CMNT-IMP: NORMAL
SODIUM SERPL-SCNC: 140 MMOL/L (ref 136–145)
WBC # BLD AUTO: 8.6 K/UL (ref 3.6–11)

## 2017-08-09 PROCEDURE — 85025 COMPLETE CBC W/AUTO DIFF WBC: CPT | Performed by: SURGERY

## 2017-08-09 PROCEDURE — 74011000258 HC RX REV CODE- 258: Performed by: NURSE PRACTITIONER

## 2017-08-09 PROCEDURE — 74011636637 HC RX REV CODE- 636/637: Performed by: NURSE PRACTITIONER

## 2017-08-09 PROCEDURE — 74011000250 HC RX REV CODE- 250: Performed by: NURSE PRACTITIONER

## 2017-08-09 PROCEDURE — 74011250636 HC RX REV CODE- 250/636: Performed by: INTERNAL MEDICINE

## 2017-08-09 PROCEDURE — 82962 GLUCOSE BLOOD TEST: CPT

## 2017-08-09 PROCEDURE — 97116 GAIT TRAINING THERAPY: CPT

## 2017-08-09 PROCEDURE — 74011000250 HC RX REV CODE- 250: Performed by: INTERNAL MEDICINE

## 2017-08-09 PROCEDURE — 74011250637 HC RX REV CODE- 250/637: Performed by: SURGERY

## 2017-08-09 PROCEDURE — 83735 ASSAY OF MAGNESIUM: CPT | Performed by: SURGERY

## 2017-08-09 PROCEDURE — 80048 BASIC METABOLIC PNL TOTAL CA: CPT | Performed by: SURGERY

## 2017-08-09 PROCEDURE — 74011250636 HC RX REV CODE- 250/636: Performed by: SURGERY

## 2017-08-09 PROCEDURE — 65660000000 HC RM CCU STEPDOWN

## 2017-08-09 PROCEDURE — 3331090001 HH PPS REVENUE CREDIT

## 2017-08-09 PROCEDURE — 74011250636 HC RX REV CODE- 250/636: Performed by: NURSE PRACTITIONER

## 2017-08-09 PROCEDURE — 3331090002 HH PPS REVENUE DEBIT

## 2017-08-09 PROCEDURE — 74011250636 HC RX REV CODE- 250/636: Performed by: EMERGENCY MEDICINE

## 2017-08-09 PROCEDURE — 36415 COLL VENOUS BLD VENIPUNCTURE: CPT | Performed by: SURGERY

## 2017-08-09 PROCEDURE — 84100 ASSAY OF PHOSPHORUS: CPT | Performed by: SURGERY

## 2017-08-09 PROCEDURE — 74011250637 HC RX REV CODE- 250/637: Performed by: PHYSICIAN ASSISTANT

## 2017-08-09 PROCEDURE — 74011250636 HC RX REV CODE- 250/636: Performed by: PHYSICAL MEDICINE & REHABILITATION

## 2017-08-09 RX ORDER — INSULIN LISPRO 100 [IU]/ML
INJECTION, SOLUTION INTRAVENOUS; SUBCUTANEOUS 2 TIMES DAILY
Status: DISCONTINUED | OUTPATIENT
Start: 2017-08-09 | End: 2017-08-24 | Stop reason: HOSPADM

## 2017-08-09 RX ORDER — DEXTROSE 50 % IN WATER (D50W) INTRAVENOUS SYRINGE
12.5-25 AS NEEDED
Status: DISCONTINUED | OUTPATIENT
Start: 2017-08-09 | End: 2017-08-09 | Stop reason: SDUPTHER

## 2017-08-09 RX ORDER — MAGNESIUM SULFATE 100 %
4 CRYSTALS MISCELLANEOUS AS NEEDED
Status: DISCONTINUED | OUTPATIENT
Start: 2017-08-09 | End: 2017-08-09 | Stop reason: SDUPTHER

## 2017-08-09 RX ADMIN — Medication: at 19:06

## 2017-08-09 RX ADMIN — NYSTATIN 500000 UNITS: 100000 SUSPENSION ORAL at 12:49

## 2017-08-09 RX ADMIN — HYDROMORPHONE HYDROCHLORIDE 1 MG: 1 INJECTION, SOLUTION INTRAMUSCULAR; INTRAVENOUS; SUBCUTANEOUS at 10:11

## 2017-08-09 RX ADMIN — NYSTATIN 500000 UNITS: 100000 SUSPENSION ORAL at 09:12

## 2017-08-09 RX ADMIN — Medication 10 ML: at 15:20

## 2017-08-09 RX ADMIN — Medication 10 ML: at 21:26

## 2017-08-09 RX ADMIN — PANTOPRAZOLE SODIUM 40 MG: 40 TABLET, DELAYED RELEASE ORAL at 09:12

## 2017-08-09 RX ADMIN — POTASSIUM CHLORIDE: 2 INJECTION, SOLUTION, CONCENTRATE INTRAVENOUS at 19:02

## 2017-08-09 RX ADMIN — Medication 0.76 MG: at 05:34

## 2017-08-09 RX ADMIN — Medication 0.76 MG: at 21:25

## 2017-08-09 RX ADMIN — Medication: at 03:12

## 2017-08-09 RX ADMIN — NYSTATIN 500000 UNITS: 100000 SUSPENSION ORAL at 19:01

## 2017-08-09 RX ADMIN — HYDROMORPHONE HYDROCHLORIDE 1 MG: 1 INJECTION, SOLUTION INTRAMUSCULAR; INTRAVENOUS; SUBCUTANEOUS at 21:22

## 2017-08-09 RX ADMIN — Medication 10 ML: at 05:34

## 2017-08-09 RX ADMIN — CLOPIDOGREL BISULFATE 75 MG: 75 TABLET ORAL at 09:12

## 2017-08-09 RX ADMIN — Medication 0.76 MG: at 14:00

## 2017-08-09 RX ADMIN — I.V. FAT EMULSION 500 ML: 20 EMULSION INTRAVENOUS at 19:00

## 2017-08-09 RX ADMIN — HYDROMORPHONE HYDROCHLORIDE 1 MG: 1 INJECTION, SOLUTION INTRAMUSCULAR; INTRAVENOUS; SUBCUTANEOUS at 03:08

## 2017-08-09 RX ADMIN — SODIUM CHLORIDE, SODIUM LACTATE, POTASSIUM CHLORIDE, AND CALCIUM CHLORIDE 25 ML/HR: 600; 310; 30; 20 INJECTION, SOLUTION INTRAVENOUS at 12:07

## 2017-08-09 RX ADMIN — Medication: at 19:53

## 2017-08-09 RX ADMIN — Medication 10 ML: at 15:19

## 2017-08-09 RX ADMIN — SODIUM CHLORIDE 5 MG: 9 INJECTION INTRAMUSCULAR; INTRAVENOUS; SUBCUTANEOUS at 12:02

## 2017-08-09 RX ADMIN — ONDANSETRON 8 MG: 2 INJECTION INTRAMUSCULAR; INTRAVENOUS at 09:23

## 2017-08-09 RX ADMIN — Medication 10 ML: at 19:01

## 2017-08-09 RX ADMIN — Medication: at 12:49

## 2017-08-09 RX ADMIN — ASPIRIN 81 MG CHEWABLE TABLET 81 MG: 81 TABLET CHEWABLE at 09:12

## 2017-08-09 NOTE — PROGRESS NOTES
Subjective  Complains of nausea and leakage. Temp:  [98.2 °F (36.8 °C)-98.7 °F (37.1 °C)]   Pulse (Heart Rate):  []   BP: ()/(50-86)   Resp Rate:  [16-20]   O2 Sat (%):  [97 %-100 %]   Weight:  [331 lb 9.2 oz (150.4 kg)-348 lb 12.3 oz (158.2 kg)]      08/07 1901 - 08/09 0700  In: 3520.4 [P.O.:330; I.V.:3190.4]  Out: 1993 [Urine:2850; Drains:1935]      Objective  Gen- Alert in NAD  Lungs- CTA  H- RRR/ Tachycardic  Abd- soft/ mild leakage from ostomy bag  Vac in place. Recent Results (from the past 24 hour(s))   GLUCOSE, POC    Collection Time: 08/08/17  4:57 PM   Result Value Ref Range    Glucose (POC) 91 65 - 100 mg/dL    Performed by Shannon Ann    GLUCOSE, POC    Collection Time: 08/08/17 11:00 PM   Result Value Ref Range    Glucose (POC) 123 (H) 65 - 100 mg/dL    Performed by Iris Bahena    CBC WITH AUTOMATED DIFF    Collection Time: 08/09/17  3:16 AM   Result Value Ref Range    WBC 8.6 3.6 - 11.0 K/uL    RBC 2.58 (L) 3.80 - 5.20 M/uL    HGB 7.4 (L) 11.5 - 16.0 g/dL    HCT 23.8 (L) 35.0 - 47.0 %    MCV 92.2 80.0 - 99.0 FL    MCH 28.7 26.0 - 34.0 PG    MCHC 31.1 30.0 - 36.5 g/dL    RDW 17.2 (H) 11.5 - 14.5 %    PLATELET 458 (H) 710 - 400 K/uL    NEUTROPHILS 46 32 - 75 %    LYMPHOCYTES 42 12 - 49 %    MONOCYTES 8 5 - 13 %    EOSINOPHILS 4 0 - 7 %    BASOPHILS 0 0 - 1 %    ABS. NEUTROPHILS 4.0 1.8 - 8.0 K/UL    ABS. LYMPHOCYTES 3.6 (H) 0.8 - 3.5 K/UL    ABS. MONOCYTES 0.7 0.0 - 1.0 K/UL    ABS. EOSINOPHILS 0.3 0.0 - 0.4 K/UL    ABS.  BASOPHILS 0.0 0.0 - 0.1 K/UL   METABOLIC PANEL, BASIC    Collection Time: 08/09/17  3:16 AM   Result Value Ref Range    Sodium 140 136 - 145 mmol/L    Potassium 4.3 3.5 - 5.1 mmol/L    Chloride 107 97 - 108 mmol/L    CO2 25 21 - 32 mmol/L    Anion gap 8 5 - 15 mmol/L    Glucose 94 65 - 100 mg/dL    BUN 19 6 - 20 MG/DL    Creatinine 0.63 0.55 - 1.02 MG/DL    BUN/Creatinine ratio 30 (H) 12 - 20      GFR est AA >60 >60 ml/min/1.73m2    GFR est non-AA >60 >60 ml/min/1.73m2    Calcium 8.9 8.5 - 10.1 MG/DL   MAGNESIUM    Collection Time: 08/09/17  3:16 AM   Result Value Ref Range    Magnesium 1.9 1.6 - 2.4 mg/dL   PHOSPHORUS    Collection Time: 08/09/17  3:16 AM   Result Value Ref Range    Phosphorus 4.0 2.6 - 4.7 MG/DL   GLUCOSE, POC    Collection Time: 08/09/17  5:30 AM   Result Value Ref Range    Glucose (POC) 121 (H) 65 - 100 mg/dL    Performed by Janette 7, POC    Collection Time: 08/09/17 11:43 AM   Result Value Ref Range    Glucose (POC) 93 65 - 100 mg/dL    Performed by ALBA SALGUERO(CON)        Active Problems:    Abdominal pain (6/25/2017)      Small bowel ischemia ([address] (6/28/2017)      Overview: CT abd/pelvis 6/28/17      1. The stomach and proximal small bowel loops are distended. There is a       loop of      small bowel in the midabdomen which is mildly dilated and does not       demonstrate      enhancement in the wall and there is suspicion of pneumatosis and this       leads to      a loop of small bowel which demonstrates thickening of the wall and       findings are      suspicious for ischemia. 2. There is extensive mesenteric edema and a small amount of ascites in       the      pelvis. 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 There is nonocclusive thrombus in the SMA. 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

## 2017-08-09 NOTE — PROGRESS NOTES
ID Progress Note  2017    Subjective:     Problems with the wound vac over the weekend    Objective:     Antibiotics:  1. Zosyn     Vitals:   Visit Vitals    /83 (BP 1 Location: Left arm, BP Patient Position: Sitting)    Pulse (!) 110    Temp 98.3 °F (36.8 °C)    Resp 18    Ht 5' 4\" (1.626 m)    Wt 150.4 kg (331 lb 9.2 oz)    SpO2 99%    Breastfeeding No    BMI 56.91 kg/m2        Tmax:  Temp (24hrs), Av.3 °F (36.8 °C), Min:98.2 °F (36.8 °C), Max:98.5 °F (36.9 °C)      Exam:  Lungs:  clear to auscultation bilaterally  Heart:  regularly irregular rhythm  Abdomen:  abnormal findings:  tenderness moderate in the entire abdomen, hypoactive bowel sounds    Labs:      Recent Labs      17   0316  17   0439  17   0531   WBC  8.6  8.7  7.3   HGB  7.4*  7.3*  7.3*   PLT  416*  398  360   BUN  19  18  20   CREA  0.63  0.66  0.70       Cultures:     Lab Results   Component Value Date/Time    Specimen Description: URINE 2009 07:45 PM     Lab Results   Component Value Date/Time    Culture result: NO GROWTH ON SOLID MEDIA AT 4 DAYS 2017 12:17 PM    Culture result:  2017 12:17 PM     **METHICILLIN RESISTANT STAPHYLOCOCCUS AUREUS**  ISOLATED FROM THIO BROTH ONLY      Culture result: NO GROWTH 5 DAYS 2017 02:35 PM       Radiology:     Line/Insert Date:           Assessment:     1. Ischemic gut with frozen abdomen  2. Leukocytosis--back up after surgery--trending back down overall--down to normal  3. Cultures negative to date    Objective:     1.  Monitor off antibiotics      Dayana Valles MD

## 2017-08-09 NOTE — PROGRESS NOTES
Patient discussed during IDR this am. Patient remains with wound vac, TPN, PCA, PICC Line and Rabia drain. Per PT, she remains with decreased endurance and activity tolerance. After 50 ft, patient's  bpm and RR increased. Patient is medically complexed and may need an LTAC. CM will discuss discharge planning with Dr. Gabino Dewey tomorrow. Lainey Rahman MSA, RN, CRM.

## 2017-08-09 NOTE — PROGRESS NOTES
NUTRITION COMPLETE ASSESSMENT    RECOMMENDATIONS:   1. Continue TPN at goal to meet 100% of estimated needs for wound healing  -- Check BG 2 hours into infusion and 1 hour after infusion is completed (ie; 2100, 1100)  -- Adjust correction scale order accordingly to cover prn (she has not required correction scale since 2 units given x1 on 8/1)    2. If trace elements and zinc sulfate are indicated to be given daily, would request permission from pharmacy secondary to nationwide shortage. 3. Continue daily weight (standing only-- 10 kg difference in bedscale is ongoing)    4. Adjust IVF prn to maintain fluid balance     Interventions/Plan:   Food/Nutrient Delivery:  TPN as sole source of nutrition    Assessment:   Reason for Assessment:   [x]Reassessment     Diet: NPO with ice chips (surgeon has also allowed applejuice and gingerale for pleasure sips)    Nutritionally Significant Medications: [x] Reviewed & Includes: protonix; plavix  TPN:   5%AA D20 @ 85 ml/hr x 1 hour    @ 172 mL/hr x 13 hours    @ 85 mL/hr x last hour   + MVI, thiamine (100 mg), trace elements (3x/week), zinc sulfate 10 mg (3x/week)   + 20% lipids, 500 mL 3x/week     Subjective:  Pt overall in good spirits, but was tearful off and on during visit. She really likes the staff on this unit (\"they care about me and hold my hand when I need them to\"). She doesn't want to be moved out of her room. Pt also complaining about the tips of her fingers being really sore from POC BG checks q 6 hours-- order in to check twice/day (2 hours into infusion and one hour after). Objective:  Chart reviewed, discussed with RN and team during interdisciplinary rounds. Pt remains on TPN as sole source of nutrition. 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 Wound VAC in place (350 mL out yesterday).   She may benefit from daily trace elements and zinc sulfate daily, but would need permission from pharmacy. Drain Output Totals:  2275 mL (8/7/17)  4550 mL (8/6/17)  3600 mL (8/5/17)    TPN as above to provide a daily average of 2546 kcal, 120 g protein in 2406 mL-- meeting 100% of estimated needs. LR provides an additional 60 mL/hr. May need to increase to maintain fluid balance. Pt with significant weight change from 3 weeks ago. PT obtained standing scale weight today during treatment session and RD re-calculated estimated needs. Estimated Nutrition Needs:   Kcals/day: 6032 Kcals/day (7066-0402 kcal/day [facility]. Jeor x 1.2-1.3))  Protein: 110 g (110-136 g/day (2-2.5 g/kg IBW))  Fluid:  (1  ml/kcal)     Based On: [place] Jeor  Weight Used: Actual wt (150.4 kg (standing scale weight 8/9/17))    Pt expected to meet estimated nutrient needs:  [x]   Yes (via standing scale)    Nutrition Diagnosis:   1.  Inadequate oral food/beverage intake related to unresectable ischemic bowel  as evidenced by NPO with TPN as sole source of nutrition    Goals:     TPN to meet at least 90% of estimated needs over the next 5-7 days     Monitoring & Evaluation:    - Enteral/parenteral nutrition intake   - Electrolyte and renal profile, Weight/weight change, GI profile     Previous Nutrition Goals Met:  Yes  Previous Recommendations:      Yes    Education & Discharge Needs:   [] None Identified   [x] Identified and addressed    [x] Participated in care plan, discharge planning, and/or interdisciplinary rounds        Cultural, Religion and ethnic food preferences identified:   None    Skin Integrity: []Intact  [x]Other  Edema: []None [x]Other: 1+  Last BM: ostomy  Food Allergies: [x]None []Other    Anthropometrics:    Weight Loss Metrics 8/9/2017 6/25/2017 6/25/2017 6/25/2017 6/22/2017 6/12/2017 6/6/2017   Today's Wt 331 lb 9.2 oz - 343 lb - 343 lb 343 lb 14.7 oz -   BMI - 56.91 kg/m2 - 58.88 kg/m2 58.88 kg/m2 - 59.03 kg/m2      Last 3 Recorded Weights in this Encounter    08/08/17 0324 08/09/17 0531 08/09/17 1459 Weight: 157.6 kg (347 lb 7.1 oz) 158.2 kg (348 lb 12.3 oz) 150.4 kg (331 lb 9.2 oz)      Weight Source: Standing scale (comment) (obtained by PT ~1500)  Height: 5' 4\" (162.6 cm),    Body mass index is 56.91 kg/(m^2). IBW : 54.4 kg (120 lb), % IBW (Calculated): 263.45 %   ,    Labs:    Lab Results   Component Value Date/Time    Sodium 140 08/09/2017 03:16 AM    Potassium 4.3 08/09/2017 03:16 AM    Chloride 107 08/09/2017 03:16 AM    CO2 25 08/09/2017 03:16 AM    Glucose 94 08/09/2017 03:16 AM    BUN 19 08/09/2017 03:16 AM    Creatinine 0.63 08/09/2017 03:16 AM    Calcium 8.9 08/09/2017 03:16 AM    Magnesium 1.9 08/09/2017 03:16 AM    Phosphorus 4.0 08/09/2017 03:16 AM    Albumin 1.5 07/08/2017 04:42 AM     No results found for: HBA1C, HGBE8, MLH9CXSR, TWN2JZPQ, GOY4NSTR  Lab Results   Component Value Date/Time    Glucose 94 08/09/2017 03:16 AM    Glucose (POC) 93 08/09/2017 11:43 AM      Lab Results   Component Value Date/Time    ALT (SGPT) 13 07/08/2017 04:42 AM    AST (SGOT) 11 07/08/2017 04:42 AM    Alk.  phosphatase 120 07/08/2017 04:42 AM    Bilirubin, direct 0.1 07/07/2009 06:38 PM    Bilirubin, total 0.6 07/08/2017 04:42 AM     [address]
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
## 2017-08-09 NOTE — PROGRESS NOTES
Bedside shift change report given to MAURI Soto (oncoming nurse) by MAURI Aleman (offgoing nurse). Report included the following information SBAR, Intake/Output, MAR, Accordion and Cardiac Rhythm NSR/ST.

## 2017-08-09 NOTE — PROGRESS NOTES
Bedside shift change report given to April (oncoming nurse) by Truman Dalton (offgoing nurse). Report included the following information SBAR, Kardex, Procedure Summary, Intake/Output, MAR and Recent Results.

## 2017-08-10 LAB
ANION GAP BLD CALC-SCNC: 8 MMOL/L (ref 5–15)
BASOPHILS # BLD AUTO: 0 K/UL (ref 0–0.1)
BASOPHILS # BLD: 0 % (ref 0–1)
BUN SERPL-MCNC: 20 MG/DL (ref 6–20)
BUN/CREAT SERPL: 31 (ref 12–20)
CALCIUM SERPL-MCNC: 9 MG/DL (ref 8.5–10.1)
CHLORIDE SERPL-SCNC: 106 MMOL/L (ref 97–108)
CO2 SERPL-SCNC: 25 MMOL/L (ref 21–32)
CREAT SERPL-MCNC: 0.65 MG/DL (ref 0.55–1.02)
EOSINOPHIL # BLD: 0.3 K/UL (ref 0–0.4)
EOSINOPHIL NFR BLD: 4 % (ref 0–7)
ERYTHROCYTE [DISTWIDTH] IN BLOOD BY AUTOMATED COUNT: 17.4 % (ref 11.5–14.5)
GLUCOSE BLD STRIP.AUTO-MCNC: 113 MG/DL (ref 65–100)
GLUCOSE BLD STRIP.AUTO-MCNC: 99 MG/DL (ref 65–100)
GLUCOSE SERPL-MCNC: 92 MG/DL (ref 65–100)
HCT VFR BLD AUTO: 24.8 % (ref 35–47)
HGB BLD-MCNC: 7.7 G/DL (ref 11.5–16)
LYMPHOCYTES # BLD AUTO: 32 % (ref 12–49)
LYMPHOCYTES # BLD: 2.8 K/UL (ref 0.8–3.5)
MAGNESIUM SERPL-MCNC: 1.8 MG/DL (ref 1.6–2.4)
MCH RBC QN AUTO: 28.7 PG (ref 26–34)
MCHC RBC AUTO-ENTMCNC: 31 G/DL (ref 30–36.5)
MCV RBC AUTO: 92.5 FL (ref 80–99)
MONOCYTES # BLD: 0.8 K/UL (ref 0–1)
MONOCYTES NFR BLD AUTO: 9 % (ref 5–13)
NEUTS SEG # BLD: 4.6 K/UL (ref 1.8–8)
NEUTS SEG NFR BLD AUTO: 55 % (ref 32–75)
PHOSPHATE SERPL-MCNC: 4 MG/DL (ref 2.6–4.7)
PLATELET # BLD AUTO: 411 K/UL (ref 150–400)
POTASSIUM SERPL-SCNC: 4.4 MMOL/L (ref 3.5–5.1)
RBC # BLD AUTO: 2.68 M/UL (ref 3.8–5.2)
SERVICE CMNT-IMP: ABNORMAL
SERVICE CMNT-IMP: NORMAL
SODIUM SERPL-SCNC: 139 MMOL/L (ref 136–145)
WBC # BLD AUTO: 8.5 K/UL (ref 3.6–11)

## 2017-08-10 PROCEDURE — 74011636637 HC RX REV CODE- 636/637: Performed by: SURGERY

## 2017-08-10 PROCEDURE — 74011000250 HC RX REV CODE- 250: Performed by: SURGERY

## 2017-08-10 PROCEDURE — 74011250636 HC RX REV CODE- 250/636: Performed by: SURGERY

## 2017-08-10 PROCEDURE — 65660000000 HC RM CCU STEPDOWN

## 2017-08-10 PROCEDURE — 74011250636 HC RX REV CODE- 250/636: Performed by: NURSE PRACTITIONER

## 2017-08-10 PROCEDURE — 36415 COLL VENOUS BLD VENIPUNCTURE: CPT | Performed by: SURGERY

## 2017-08-10 PROCEDURE — 74011250636 HC RX REV CODE- 250/636: Performed by: EMERGENCY MEDICINE

## 2017-08-10 PROCEDURE — 77030018717 HC DRSG GRNUFM KCON -B

## 2017-08-10 PROCEDURE — 80048 BASIC METABOLIC PNL TOTAL CA: CPT | Performed by: SURGERY

## 2017-08-10 PROCEDURE — 74011000250 HC RX REV CODE- 250: Performed by: INTERNAL MEDICINE

## 2017-08-10 PROCEDURE — 74011250636 HC RX REV CODE- 250/636: Performed by: INTERNAL MEDICINE

## 2017-08-10 PROCEDURE — 77030019952 HC CANSTR VAC ASST KCON -B

## 2017-08-10 PROCEDURE — 84100 ASSAY OF PHOSPHORUS: CPT | Performed by: SURGERY

## 2017-08-10 PROCEDURE — 3331090001 HH PPS REVENUE CREDIT

## 2017-08-10 PROCEDURE — 97116 GAIT TRAINING THERAPY: CPT

## 2017-08-10 PROCEDURE — 3C1ZX8Z IRRIGATION OF INDWELLING DEVICE USING IRRIGATING SUBSTANCE, EXTERNAL APPROACH: ICD-10-PCS | Performed by: RADIOLOGY

## 2017-08-10 PROCEDURE — 82962 GLUCOSE BLOOD TEST: CPT

## 2017-08-10 PROCEDURE — 74011250637 HC RX REV CODE- 250/637: Performed by: SURGERY

## 2017-08-10 PROCEDURE — 85025 COMPLETE CBC W/AUTO DIFF WBC: CPT | Performed by: SURGERY

## 2017-08-10 PROCEDURE — 74011250636 HC RX REV CODE- 250/636: Performed by: PHYSICAL MEDICINE & REHABILITATION

## 2017-08-10 PROCEDURE — 97606 NEG PRS WND THER DME>50 SQCM: CPT

## 2017-08-10 PROCEDURE — 77030018836 HC SOL IRR NACL ICUM -A

## 2017-08-10 PROCEDURE — 3331090002 HH PPS REVENUE DEBIT

## 2017-08-10 PROCEDURE — 74011000258 HC RX REV CODE- 258: Performed by: SURGERY

## 2017-08-10 PROCEDURE — 74011250637 HC RX REV CODE- 250/637: Performed by: PHYSICIAN ASSISTANT

## 2017-08-10 PROCEDURE — 83735 ASSAY OF MAGNESIUM: CPT | Performed by: SURGERY

## 2017-08-10 RX ADMIN — Medication 10 ML: at 05:53

## 2017-08-10 RX ADMIN — Medication 10 ML: at 14:51

## 2017-08-10 RX ADMIN — HYDROMORPHONE HYDROCHLORIDE 1 MG: 1 INJECTION, SOLUTION INTRAMUSCULAR; INTRAVENOUS; SUBCUTANEOUS at 10:47

## 2017-08-10 RX ADMIN — CLOPIDOGREL BISULFATE 75 MG: 75 TABLET ORAL at 08:53

## 2017-08-10 RX ADMIN — POTASSIUM CHLORIDE: 2 INJECTION, SOLUTION, CONCENTRATE INTRAVENOUS at 18:50

## 2017-08-10 RX ADMIN — Medication 0.76 MG: at 22:45

## 2017-08-10 RX ADMIN — PANTOPRAZOLE SODIUM 40 MG: 40 TABLET, DELAYED RELEASE ORAL at 07:22

## 2017-08-10 RX ADMIN — Medication 0.76 MG: at 14:44

## 2017-08-10 RX ADMIN — Medication: at 03:56

## 2017-08-10 RX ADMIN — NYSTATIN 500000 UNITS: 100000 SUSPENSION ORAL at 18:50

## 2017-08-10 RX ADMIN — ASPIRIN 81 MG CHEWABLE TABLET 81 MG: 81 TABLET CHEWABLE at 08:54

## 2017-08-10 RX ADMIN — Medication: at 16:55

## 2017-08-10 RX ADMIN — HYDROMORPHONE HYDROCHLORIDE 1 MG: 1 INJECTION, SOLUTION INTRAMUSCULAR; INTRAVENOUS; SUBCUTANEOUS at 08:53

## 2017-08-10 RX ADMIN — Medication: at 10:36

## 2017-08-10 RX ADMIN — NYSTATIN 500000 UNITS: 100000 SUSPENSION ORAL at 14:51

## 2017-08-10 RX ADMIN — HYDROMORPHONE HYDROCHLORIDE 1 MG: 1 INJECTION, SOLUTION INTRAMUSCULAR; INTRAVENOUS; SUBCUTANEOUS at 18:10

## 2017-08-10 RX ADMIN — SODIUM CHLORIDE 5 MG: 9 INJECTION INTRAMUSCULAR; INTRAVENOUS; SUBCUTANEOUS at 10:46

## 2017-08-10 RX ADMIN — Medication 10 ML: at 22:46

## 2017-08-10 RX ADMIN — ONDANSETRON 8 MG: 2 INJECTION INTRAMUSCULAR; INTRAVENOUS at 09:03

## 2017-08-10 RX ADMIN — ONDANSETRON 8 MG: 2 INJECTION INTRAMUSCULAR; INTRAVENOUS at 00:39

## 2017-08-10 RX ADMIN — ONDANSETRON 8 MG: 2 INJECTION INTRAMUSCULAR; INTRAVENOUS at 14:50

## 2017-08-10 RX ADMIN — NYSTATIN 500000 UNITS: 100000 SUSPENSION ORAL at 08:53

## 2017-08-10 RX ADMIN — HYDROMORPHONE HYDROCHLORIDE 1 MG: 1 INJECTION, SOLUTION INTRAMUSCULAR; INTRAVENOUS; SUBCUTANEOUS at 20:16

## 2017-08-10 RX ADMIN — Medication 0.76 MG: at 05:52

## 2017-08-10 RX ADMIN — HYDROMORPHONE HYDROCHLORIDE 1 MG: 1 INJECTION, SOLUTION INTRAMUSCULAR; INTRAVENOUS; SUBCUTANEOUS at 14:46

## 2017-08-10 RX ADMIN — HYDROMORPHONE HYDROCHLORIDE 1 MG: 1 INJECTION, SOLUTION INTRAMUSCULAR; INTRAVENOUS; SUBCUTANEOUS at 12:26

## 2017-08-10 NOTE — PROGRESS NOTES
Problem: Mobility Impaired (Adult and Pediatric)  Goal: *Acute Goals and Plan of Care (Insert Text)  Physical Therapy Goals,   updated 8/4/2017    1. Pt will ambulate in hallways for 250 feet with mod Ind within 7 days  2. Patient will perform bed mobility with mod independence within 7 days. 3. Patient will ascend and descend 4 steps within 7 days. 4. Patient will perform sit to stand independently within 7 days. Physical Therapy Goals  Revised 7/28/2017  1. Patient will transfer from bed to chair and chair to bed with modified independence using the least restrictive device within 7 day(s). 2. Patient will perform sit to stand with modified independence within 7 day(s). 3. Patient will ambulate with modified independence for 250 feet with the least restrictive device within 7 day(s). 4. Patient will progress to ambulating in the hallway within 7 days. 5. Patient will ascend and descend 4 steps within 7 days. Physical Therapy Goals  Revised 7/20/2017  1. Patient will move from supine to sit and sit to supine , scoot up and down and roll side to side in bed with independence within 7 day(s). 2. Patient will transfer from bed to chair and chair to bed with supervision/set-up using the least restrictive device within 7 day(s). 3. Patient will perform sit to stand with supervision/set-up within 7 day(s). 4. Patient will ambulate with supervision/set-up for 200 feet with the least restrictive device within 7 day(s). 5. Patient will ascend/descend 7 stairs with one handrail(s) with supervision/set-up within 7 day(s). Physical Therapy Goals  7/12/2017  1. Patient will move from supine to sit and sit to supine , scoot up and down and roll side to side in bed with independence within 7 day(s). 2. Patient will transfer from bed to chair and chair to bed with supervision/set-up using the least restrictive device within 7 day(s).   3. Patient will perform sit to stand with supervision/set-up within 7 day(s). 4. Patient will ambulate with supervision/set-up for 50 feet with the least restrictive device within 7 day(s). PHYSICAL THERAPY TREATMENT  Patient: Johana Hobbs (46 y.o. female)  Date: 8/10/2017  Diagnosis: N/V intractable post-opt pain  Abdominal pain  poor iv access  DEAD BOWEL  SMALL BOWEL OBSTRUCTION  central line placement  Abdominal pain <principal problem not specified>  Procedure(s) (LRB):  SPECIAL PROCEDURE OUTSIDE OF OR (N/A) 28 Days Post-Op  Precautions: Fall, Contact (wound vac + 1-2 additional drains)      ASSESSMENT:  Patient continues to progress. Patient requires assistance with line management to prepare for ambulation but once lines are set up she is able to ambulate around unit with supervision. No LOB noted and moderate gait speed noted. Able to navigate obstacles with IV pole without assistance. She does require min A for bed mobility, for trunk management only. Patient remained up in chair at end of session with all lines reconnected and needs met. Patient for weekly re-eval next session and recommend decreasing patient frequency to 3 x week or less. Patient is cleared to ambulate hallways with nursing assistance at least 3 x daily. Progression toward goals:  [X]    Improving appropriately and progressing toward goals  [ ]    Improving slowly and progressing toward goals  [ ]    Not making progress toward goals and plan of care will be adjusted       PLAN:  Patient continues to benefit from skilled intervention to address the above impairments. Continue treatment per established plan of care. Discharge Recommendations:  Home Health  Further Equipment Recommendations for Discharge:  TBD       SUBJECTIVE:   Patient stated Orlin Craig was waiting for my nurse to walk and get out of bed.       OBJECTIVE DATA SUMMARY:   Critical Behavior:  Neurologic State: Alert  Orientation Level: Oriented X4  Cognition: Appropriate decision making, Appropriate for age attention/concentration, Appropriate safety awareness, Follows commands     Functional Mobility Training:  Bed Mobility:     Supine to Sit: Minimum assistance              Transfers:  Sit to Stand: Supervision  Stand to Sit: Supervision  Stand Pivot Transfers: Supervision                          Balance:  Sitting: Intact  Standing: Intact; With support  Ambulation/Gait Training:  Distance (ft): 200 Feet (ft)  Assistive Device:  (IV pole)  Ambulation - Level of Assistance: Supervision        Gait Abnormalities: Decreased step clearance              Speed/Liz: Pace decreased (<100 feet/min)  Step Length: Right shortened;Left shortened                               Stairs:                       Neuro Re-Education:     Therapeutic Exercises:      Pain:  Pain Scale 1: Numeric (0 - 10)  Pain Intensity 1: 9  Pain Location 1: Abdomen  Pain Orientation 1: Anterior  Pain Description 1: Aching;Constant  Pain Intervention(s) 1: Medication (see MAR); Encouraged PCA  Activity Tolerance:   Good  Please refer to the flowsheet for vital signs taken during this treatment.   After treatment:   [X]    Patient left in no apparent distress sitting up in chair  [ ]    Patient left in no apparent distress in bed  [X]    Call bell left within reach  [X]    Nursing notified  [ ]    Caregiver present  [ ]    Bed alarm activated      COMMUNICATION/COLLABORATION:   The patients plan of care was discussed with: Registered Nurse     Randal Smith, PT   Time Calculation: 21 mins

## 2017-08-10 NOTE — PROGRESS NOTES
ID Progress Note  8/10/2017    Subjective:     Problems with the wound vac over the weekend    Objective:     Antibiotics:  1. Zosyn     Vitals:   Visit Vitals    /71 (BP 1 Location: Left arm, BP Patient Position: At rest)    Pulse (!) 105    Temp 98.6 °F (37 °C)    Resp 17    Ht 5' 4\" (1.626 m)    Wt 157.2 kg (346 lb 9 oz)    SpO2 98%    Breastfeeding No    BMI 59.49 kg/m2        Tmax:  Temp (24hrs), Av.6 °F (37 °C), Min:98.3 °F (36.8 °C), Max:98.7 °F (37.1 °C)      Exam:  Lungs:  clear to auscultation bilaterally  Heart:  regularly irregular rhythm  Abdomen:  abnormal findings:  tenderness moderate in the entire abdomen, hypoactive bowel sounds    Labs:      Recent Labs      08/10/17   0402  17   0316  17   0439   WBC  8.5  8.6  8.7   HGB  7.7*  7.4*  7.3*   PLT  411*  416*  398   BUN  20  19  18   CREA  0.65  0.63  0.66       Cultures:     Lab Results   Component Value Date/Time    Specimen Description: URINE 2009 07:45 PM     Lab Results   Component Value Date/Time    Culture result: NO GROWTH ON SOLID MEDIA AT 4 DAYS 2017 12:17 PM    Culture result:  2017 12:17 PM     **METHICILLIN RESISTANT STAPHYLOCOCCUS AUREUS**  ISOLATED FROM [facility]IO [facility]OTH ONLY      Culture result: NO GROWTH 5 DAYS 2017 02:35 PM       Radiology:     Line/Insert Date:           Assessment:     1. Ischemic gut with frozen abdomen  2. Leukocytosis--back up after surgery--trending back down overall--down to normal  3. Cultures negative to date    Objective:     1.  Monitor off antibiotics      Christen Ayers MD

## 2017-08-10 NOTE — PROGRESS NOTES
Bedside shift change report given to Rashaad Olmedo RN (oncoming nurse) by AprilMAURI (offgoing nurse). Report included the following information SBAR, Intake/Output, MAR, Accordion and Cardiac Rhythm NSR/ST.

## 2017-08-10 NOTE — PROGRESS NOTES
Subjective  No new complaints     Temp:  [98.2 °F (36.8 °C)-98.7 °F (37.1 °C)]   Pulse (Heart Rate):  []   BP: ()/(50-86)   Resp Rate:  [16-20]   O2 Sat (%):  [97 %-100 %]   Weight:  [330 lb 12.5 oz (150 kg)-348 lb 12.3 oz (158.2 kg)]   08/10 0701 - 08/10 1900  In: -   Out: 2871 [Urine:700; Drains:375]  08/08 1901 - 08/10 0700  In: 2558.2 [P.O.:300; I.V.:2258.2]  Out: 200 [Urine:3800; Drains:2650]      Objective  Gen- Alert in NAD   Abd- wound vac changed the wound is much smaller. The upper and lower portion is granulating. There is drainage From the right side. No drainage from the new appearing fistula    Recent Results (from the past 24 hour(s))   GLUCOSE, POC    Collection Time: 08/09/17  9:37 PM   Result Value Ref Range    Glucose (POC) 124 (H) 65 - 100 mg/dL    Performed by [address], BASIC    Collection Time: 08/10/17  4:02 AM   Result Value Ref Range    Sodium 139 136 - 145 mmol/L    Potassium 4.4 3.5 - 5.1 mmol/L    Chloride 106 97 - 108 mmol/L    CO2 25 21 - 32 mmol/L    Anion gap 8 5 - 15 mmol/L    Glucose 92 65 - 100 mg/dL    BUN 20 6 - 20 MG/DL    Creatinine 0.65 0.55 - 1.02 MG/DL    BUN/Creatinine ratio 31 (H) 12 - 20      GFR est AA >60 >60 ml/min/1.73m2    GFR est non-AA >60 >60 ml/min/1.73m2    Calcium 9.0 8.5 - 10.1 MG/DL   CBC WITH AUTOMATED DIFF    Collection Time: 08/10/17  4:02 AM   Result Value Ref Range    WBC 8.5 3.6 - 11.0 K/uL    RBC 2.68 (L) 3.80 - 5.20 M/uL    HGB 7.7 (L) 11.5 - 16.0 g/dL    HCT 24.8 (L) 35.0 - 47.0 %    MCV 92.5 80.0 - 99.0 FL    MCH 28.7 26.0 - 34.0 PG    MCHC 31.0 30.0 - 36.5 g/dL    RDW 17.4 (H) 11.5 - 14.5 %    PLATELET 946 (H) 425 - 400 K/uL    NEUTROPHILS 55 32 - 75 %    LYMPHOCYTES 32 12 - 49 %    MONOCYTES 9 5 - 13 %    EOSINOPHILS 4 0 - 7 %    BASOPHILS 0 0 - 1 %    ABS. NEUTROPHILS 4.6 1.8 - 8.0 K/UL    ABS. LYMPHOCYTES 2.8 0.8 - 3.5 K/UL    ABS. MONOCYTES 0.8 0.0 - 1.0 K/UL    ABS.  EOSINOPHILS 0.3 0.0 - 0.4 K/UL    ABS. BASOPHILS 0.0 0.0 - 0.1 K/UL   MAGNESIUM    Collection Time: 08/10/17  4:02 AM   Result Value Ref Range    Magnesium 1.8 1.6 - 2.4 mg/dL   PHOSPHORUS    Collection Time: 08/10/17  4:02 AM   Result Value Ref Range    Phosphorus 4.0 2.6 - 4.7 MG/DL          Active Problems:    Abdominal pain (6/25/2017)      Small bowel ischemia ([address] (6/28/2017)      Overview: CT abd/pelvis 6/28/17      1. The stomach and proximal small bowel loops are distended. There is a       loop of      small bowel in the midabdomen which is mildly dilated and does not       demonstrate      enhancement in the wall and there is suspicion of pneumatosis and this       leads to      a loop of small bowel which demonstrates thickening of the wall and       findings are      suspicious for ischemia. 2. There is extensive mesenteric edema and a small amount of ascites in       the      pelvis. 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 There is nonocclusive thrombus in the SMA.             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

## 2017-08-10 NOTE — WOUND CARE
WOCN Note:     Follow-up visit for NPWT to abdomen.       Chart shows:  Admitted for nausea, vomiting, and abdominal pain; history of Crohn's, DVT, bowel perforation with surgery 6/7/17. Exploratory lap, KATHRINE, fistula exclusion and tube placement by Dr. Ida Silver 7/7/17.      Assessment:   Patient is A&O x4 and mobile around room.    Bed: total care bariatric sport  Patient using PureWick.    Diet: NPO with sips & TPN  Pre-medicated for pain by RN and using PCA throughout visit.      Mucus fistula LLQ: red & moist and almost at skin level; some mucosal transplantation noted; output is light green mucus. Activelife flat pouch with good seal and not changed today.      LUQ red rubber catheter to bedside bag. Little output in bag. The insertion of this catheter is seen on the left margin of wound bed into proximal lumen of small intestine. Effluent leaks around cath insertion.      Insertion site of Malecot drain (now removed) in RUQ.      Rabia drain in RLQ with it's end resting in wound bed. Trimmed today to keep within wound margins.     Seen with Dr. Gerry Burnett today.       1. Midline abdominal dehisced incision = 14 x 7.5 x 3 cm (remarkably smaller).   Ana. Base is 60% moist red mucosal tissue with peristalsis, 5% scattered moist yellow, 35% moist pink with some mucus. Dressed using 1 piece of petroleum gauze folded over to create several layer to protect bowel and 1 black sponge. Cleotis Churches drain rests between petroleum gauze and black sponge. Rabia drain to wall suction. VAC @ 125 mmHg continuous suction. Recommendations:    Maintain VAC @ 125 mmHg continuous suction and Rabia drain to wall suction. Please keep both cannisters below the 400cc level reshma. Discussed above plan with patient & RN, David Garcia    Transition of Care: Plan to follow as needed while admitted to hospital with [facility] change on MOnday.     YOLANDA BaptisteN, RN, [facility]  Certified Wound, Ostomy, Continence Nurse  office [phone]  pager [phone] or call  to page

## 2017-08-10 NOTE — PROGRESS NOTES
Bedside and Verbal shift change report given to [employer] (oncoming nurse) by Tollie Cabot RN (offgoing nurse).  Report included the following information SBAR, Intake/Output, MAR, Recent Results and Cardiac Rhythm ST.

## 2017-08-10 NOTE — PROGRESS NOTES
CM discussed discharge planning with Dr. Leon Jauregui he said patient will need to be discharged with TPN and a wound vac. CM asked Dr. Alicia Sanchez if an LTAC would be appropriate. Dr. Leon Jauregui said he did not want patient to go to [facility] based on his past experiences. Dr. Leon Jauregui also said patient was not medically stable for discharge. CM will continue to follow as needed. Moshe Larson MSA, RN, CRM. 11:50 am . Dr. Leon Jauregui discussed discharge planning with patient, he said patient was open to [address] [place] [address] but would like her mother to tour the facility. CM provided patient with name, telephone number and address for [address] [place] [address]. CM will continue to follow for transition of care. Moshe Larson MSA, RN, CRM.

## 2017-08-11 LAB
ANION GAP BLD CALC-SCNC: 9 MMOL/L (ref 5–15)
BASOPHILS # BLD AUTO: 0 K/UL (ref 0–0.1)
BASOPHILS # BLD: 0 % (ref 0–1)
BUN SERPL-MCNC: 21 MG/DL (ref 6–20)
BUN/CREAT SERPL: 31 (ref 12–20)
CALCIUM SERPL-MCNC: 9 MG/DL (ref 8.5–10.1)
CHLORIDE SERPL-SCNC: 105 MMOL/L (ref 97–108)
CO2 SERPL-SCNC: 25 MMOL/L (ref 21–32)
CREAT SERPL-MCNC: 0.68 MG/DL (ref 0.55–1.02)
DIFFERENTIAL METHOD BLD: ABNORMAL
EOSINOPHIL # BLD: 0.2 K/UL (ref 0–0.4)
EOSINOPHIL NFR BLD: 3 % (ref 0–7)
ERYTHROCYTE [DISTWIDTH] IN BLOOD BY AUTOMATED COUNT: 17.2 % (ref 11.5–14.5)
GLUCOSE BLD STRIP.AUTO-MCNC: 124 MG/DL (ref 65–100)
GLUCOSE BLD STRIP.AUTO-MCNC: 98 MG/DL (ref 65–100)
GLUCOSE SERPL-MCNC: 118 MG/DL (ref 65–100)
HCT VFR BLD AUTO: 22.9 % (ref 35–47)
HGB BLD-MCNC: 7 G/DL (ref 11.5–16)
LYMPHOCYTES # BLD AUTO: 31 % (ref 12–49)
LYMPHOCYTES # BLD: 2.2 K/UL (ref 0.8–3.5)
MAGNESIUM SERPL-MCNC: 1.8 MG/DL (ref 1.6–2.4)
MCH RBC QN AUTO: 28.3 PG (ref 26–34)
MCHC RBC AUTO-ENTMCNC: 30.6 G/DL (ref 30–36.5)
MCV RBC AUTO: 92.7 FL (ref 80–99)
MONOCYTES # BLD: 0.7 K/UL (ref 0–1)
MONOCYTES NFR BLD AUTO: 10 % (ref 5–13)
NEUTS SEG # BLD: 3.9 K/UL (ref 1.8–8)
NEUTS SEG NFR BLD AUTO: 56 % (ref 32–75)
PHOSPHATE SERPL-MCNC: 4.1 MG/DL (ref 2.6–4.7)
PLATELET # BLD AUTO: 382 K/UL (ref 150–400)
PLATELET COMMENTS,PCOM: ABNORMAL
POTASSIUM SERPL-SCNC: 4.3 MMOL/L (ref 3.5–5.1)
RBC # BLD AUTO: 2.47 M/UL (ref 3.8–5.2)
RBC MORPH BLD: ABNORMAL
RBC MORPH BLD: ABNORMAL
SERVICE CMNT-IMP: ABNORMAL
SERVICE CMNT-IMP: NORMAL
SODIUM SERPL-SCNC: 139 MMOL/L (ref 136–145)
WBC # BLD AUTO: 7 K/UL (ref 3.6–11)

## 2017-08-11 PROCEDURE — 74011250636 HC RX REV CODE- 250/636: Performed by: INTERNAL MEDICINE

## 2017-08-11 PROCEDURE — 74011000250 HC RX REV CODE- 250: Performed by: NURSE PRACTITIONER

## 2017-08-11 PROCEDURE — 74011250636 HC RX REV CODE- 250/636: Performed by: PHYSICAL MEDICINE & REHABILITATION

## 2017-08-11 PROCEDURE — 74011250636 HC RX REV CODE- 250/636: Performed by: SURGERY

## 2017-08-11 PROCEDURE — 74011000250 HC RX REV CODE- 250: Performed by: SURGERY

## 2017-08-11 PROCEDURE — 74011000258 HC RX REV CODE- 258: Performed by: SURGERY

## 2017-08-11 PROCEDURE — 82962 GLUCOSE BLOOD TEST: CPT

## 2017-08-11 PROCEDURE — 80048 BASIC METABOLIC PNL TOTAL CA: CPT | Performed by: SURGERY

## 2017-08-11 PROCEDURE — 74011000250 HC RX REV CODE- 250: Performed by: INTERNAL MEDICINE

## 2017-08-11 PROCEDURE — 85025 COMPLETE CBC W/AUTO DIFF WBC: CPT | Performed by: SURGERY

## 2017-08-11 PROCEDURE — 74011250636 HC RX REV CODE- 250/636: Performed by: EMERGENCY MEDICINE

## 2017-08-11 PROCEDURE — 74011250637 HC RX REV CODE- 250/637: Performed by: PHYSICIAN ASSISTANT

## 2017-08-11 PROCEDURE — 74011636637 HC RX REV CODE- 636/637: Performed by: SURGERY

## 2017-08-11 PROCEDURE — 74011250636 HC RX REV CODE- 250/636: Performed by: NURSE PRACTITIONER

## 2017-08-11 PROCEDURE — 83735 ASSAY OF MAGNESIUM: CPT | Performed by: SURGERY

## 2017-08-11 PROCEDURE — 84100 ASSAY OF PHOSPHORUS: CPT | Performed by: SURGERY

## 2017-08-11 PROCEDURE — 74011250637 HC RX REV CODE- 250/637: Performed by: SURGERY

## 2017-08-11 PROCEDURE — 65660000000 HC RM CCU STEPDOWN

## 2017-08-11 PROCEDURE — 3331090002 HH PPS REVENUE DEBIT

## 2017-08-11 PROCEDURE — 36415 COLL VENOUS BLD VENIPUNCTURE: CPT | Performed by: SURGERY

## 2017-08-11 PROCEDURE — 3331090001 HH PPS REVENUE CREDIT

## 2017-08-11 RX ADMIN — POTASSIUM CHLORIDE: 2 INJECTION, SOLUTION, CONCENTRATE INTRAVENOUS at 18:04

## 2017-08-11 RX ADMIN — Medication 10 ML: at 22:36

## 2017-08-11 RX ADMIN — ASPIRIN 81 MG CHEWABLE TABLET 81 MG: 81 TABLET CHEWABLE at 09:05

## 2017-08-11 RX ADMIN — Medication: at 00:52

## 2017-08-11 RX ADMIN — Medication 0.76 MG: at 21:37

## 2017-08-11 RX ADMIN — Medication 10 ML: at 13:43

## 2017-08-11 RX ADMIN — SODIUM CHLORIDE 5 MG: 9 INJECTION INTRAMUSCULAR; INTRAVENOUS; SUBCUTANEOUS at 18:29

## 2017-08-11 RX ADMIN — Medication 10 ML: at 05:31

## 2017-08-11 RX ADMIN — HYDROMORPHONE HYDROCHLORIDE 1 MG: 1 INJECTION, SOLUTION INTRAMUSCULAR; INTRAVENOUS; SUBCUTANEOUS at 01:05

## 2017-08-11 RX ADMIN — CLOPIDOGREL BISULFATE 75 MG: 75 TABLET ORAL at 09:05

## 2017-08-11 RX ADMIN — Medication 0.76 MG: at 05:31

## 2017-08-11 RX ADMIN — HYDROMORPHONE HYDROCHLORIDE 1 MG: 1 INJECTION, SOLUTION INTRAMUSCULAR; INTRAVENOUS; SUBCUTANEOUS at 09:10

## 2017-08-11 RX ADMIN — HYDROMORPHONE HYDROCHLORIDE 1 MG: 1 INJECTION, SOLUTION INTRAMUSCULAR; INTRAVENOUS; SUBCUTANEOUS at 23:14

## 2017-08-11 RX ADMIN — Medication: at 17:55

## 2017-08-11 RX ADMIN — HYDROMORPHONE HYDROCHLORIDE 1 MG: 1 INJECTION, SOLUTION INTRAMUSCULAR; INTRAVENOUS; SUBCUTANEOUS at 15:01

## 2017-08-11 RX ADMIN — ONDANSETRON 8 MG: 2 INJECTION INTRAMUSCULAR; INTRAVENOUS at 05:31

## 2017-08-11 RX ADMIN — Medication 10 ML: at 23:15

## 2017-08-11 RX ADMIN — NYSTATIN 500000 UNITS: 100000 SUSPENSION ORAL at 22:33

## 2017-08-11 RX ADMIN — HYDROMORPHONE HYDROCHLORIDE 1 MG: 1 INJECTION, SOLUTION INTRAMUSCULAR; INTRAVENOUS; SUBCUTANEOUS at 18:28

## 2017-08-11 RX ADMIN — Medication 10 ML: at 05:32

## 2017-08-11 RX ADMIN — NYSTATIN 500000 UNITS: 100000 SUSPENSION ORAL at 18:30

## 2017-08-11 RX ADMIN — I.V. FAT EMULSION 500 ML: 20 EMULSION INTRAVENOUS at 18:21

## 2017-08-11 RX ADMIN — HYDROMORPHONE HYDROCHLORIDE 1 MG: 1 INJECTION, SOLUTION INTRAMUSCULAR; INTRAVENOUS; SUBCUTANEOUS at 21:37

## 2017-08-11 RX ADMIN — PANTOPRAZOLE SODIUM 40 MG: 40 TABLET, DELAYED RELEASE ORAL at 07:53

## 2017-08-11 RX ADMIN — SODIUM CHLORIDE 5 MG: 9 INJECTION INTRAMUSCULAR; INTRAVENOUS; SUBCUTANEOUS at 09:09

## 2017-08-11 RX ADMIN — ONDANSETRON 8 MG: 2 INJECTION INTRAMUSCULAR; INTRAVENOUS at 22:33

## 2017-08-11 RX ADMIN — Medication 10 ML: at 09:12

## 2017-08-11 RX ADMIN — Medication: at 09:49

## 2017-08-11 RX ADMIN — Medication 0.76 MG: at 13:43

## 2017-08-11 RX ADMIN — NYSTATIN 500000 UNITS: 100000 SUSPENSION ORAL at 09:07

## 2017-08-11 NOTE — PROGRESS NOTES
NUTRITION       Recommendations:  1. Continue TPN at cyclic goal  2. Continue to adjust LR IVF prn while TPN is not running to maintain fluid balance    Brief follow up. Chart reviewed, discussed with RN and team during interdisciplinary rounds. Pt remains on goal TPN, which is meeting 98% and 100% of estimated kcal and protein needs, respectively. TPN: 5%AA D20 @ 85 mL/hr x 1 hour    @ 172 mL/hr x 13 hours    @ 85 mL/hr x 1 hour   + MVI, thiamine (100 mg), 14 units insulin   + 20% lipids, 500 mL 3x/week    -- This provides a daily average of 2546 kcal, 120 g protein in 2406 mL. -- IVF (LR) is providing an additional 600 mL. Drain Output:  8/10: 2100 mL  8/9: 2640 mL  8/8:  560 mL    Estimated Nutrition Needs:   Kcals/day: 0985 Kcals/day (4417-4852 kcal/day ([place] [address] Jeor x 1.2-1.3))  Protein: 110 g (110-136 g/day (2-2.5 g/kg IBW))  Fluid:  (1  ml/kcal)     Based On: [place] Jeor  Weight Used: Actual wt (150.4 kg (standing scale weight 8/9/17))     RD to follow.     [address]

## 2017-08-11 NOTE — PROGRESS NOTES
ID Progress Note  2017    Subjective:     Problems with the wound vac over the weekend    Objective:     Antibiotics:  1. Zosyn     Vitals:   Visit Vitals    /74 (BP 1 Location: Left arm, BP Patient Position: At rest)    Pulse (!) 105    Temp 98.5 °F (36.9 °C)    Resp 20    Ht 5' 4\" (1.626 m)    Wt 157.7 kg (347 lb 10.7 oz)    SpO2 96%    Breastfeeding No    BMI 59.68 kg/m2        Tmax:  Temp (24hrs), Av.7 °F (37.1 °C), Min:98.5 °F (36.9 °C), Max:98.8 °F (37.1 °C)      Exam:  Lungs:  clear to auscultation bilaterally  Heart:  regularly irregular rhythm  Abdomen:  abnormal findings:  tenderness moderate in the entire abdomen, hypoactive bowel sounds    Labs:      Recent Labs      17   0709  17   0534  08/10/17   0402  17   0316   WBC  7.0   --   8.5  8.6   HGB  7.0*   --   7.7*  7.4*   PLT  382   --   411*  416*   BUN   --   21*  20  19   CREA   --   0.68  0.65  0.63       Cultures:     Lab Results   Component Value Date/Time    Specimen Description: URINE 2009 07:45 PM     Lab Results   Component Value Date/Time    Culture result: NO GROWTH ON SOLID MEDIA AT 4 DAYS 2017 12:17 PM    Culture result:  2017 12:17 PM     **METHICILLIN RESISTANT STAPHYLOCOCCUS AUREUS**  ISOLATED FROM THIO BROTH ONLY      Culture result: NO GROWTH 5 DAYS 2017 02:35 PM       Radiology:     Line/Insert Date:           Assessment:     1. Ischemic gut with frozen abdomen  2. Leukocytosis--back up after surgery--trending back down overall--down to normal  3. Cultures negative to date    Objective:     1. Monitor off antibiotics  2.  Group will see over the weekend if asked      Reinaldo Abraham MD

## 2017-08-11 NOTE — PROGRESS NOTES
CM met with patient and mother to discuss discharge planning. Mother said she did not see how she could care for her daughter at home. CM discussed three options available - [facility] in [facility] in [facility] a SNF that has a WOCN and accepts patients with TPN. . Patient expressed that she did not want to go to [place] but mother wanted to research all three facilities before she determines her preference. She promised her daughter that she would not recommend a facility that is not \"good\" for her. Patient also wanted to know if her mother could stay overnight in any of these facilities, CM told mother to ask them when she tours [address] [place] [address] and [facility] or calls [facility] [employer]. Mother said she would start touring the facilities in [place] today. Names, addresses and telephone numbers of the three facilities given to mother. CM to follow up with patient's mother on Monday regarding her preference.  Rachael Rivera MSA, RN, CRM

## 2017-08-11 NOTE — PROGRESS NOTES
Bedside and Verbal shift change report given to [employer] (oncoming nurse) by Prasanna Merida RN (offgoing nurse). Report included the following information SBAR, Intake/Output, MAR, Recent Results and Cardiac Rhythm NSR/ST.

## 2017-08-11 NOTE — PROGRESS NOTES
[id number] [facility] Surgery        Subjective     Doing well, [address] changed yesterday    Objective     Patient Vitals for the past 24 hrs:   Temp Pulse Resp BP SpO2   08/11/17 1109 98.8 °F (37.1 °C) (!) 106 20 121/68 100 %   08/11/17 0830 98.7 °F (37.1 °C) (!) 102 20 129/72 100 %   08/11/17 0450 98.7 °F (37.1 °C) (!) 109 18 114/57 99 %   08/10/17 2310 98.7 °F (37.1 °C) (!) 106 16 130/73 100 %   08/10/17 2020 - (!) 105 - - -   08/10/17 1639 98.8 °F (37.1 °C) (!) 120 18 121/70 99 %         Date 08/10/17 0700 - 08/11/17 0659 08/11/17 0700 - 08/12/17 0659   Shift 7473-8701 3583-4788 24 Hour Total 9540-2749 3527-0107 24 Hour Total   I  N  T  A  K  E   P.O.  240 240         P. O.  240 240       I.V.  (mL/kg/hr) 560.5  (0.3) 850  (0.4) 1410.5  (0.4)         Volume (lactated Ringers infusion)  0 0         Volume (fat emulsion 20% (LIPOSYN, INTRALIPID) infusion 500 mL) 560.5  560.5         Volume (TPN ADULT - CENTRAL)  850 850       Shift Total  (mL/kg) 560.5  (3.6) 1090  (6.9) 1650.5  (10.5)      O  U  T  P  U  T   Urine  (mL/kg/hr) 1000  (0.5) 1200  (0.6) 2200  (0.6) 400  400      Urine Output (mL) (External Female Catheter 07/12/17) 1000 1200 2200 400  400    Drains 375 1725 2100 575  575      Output (ml) (Malecot Drain (Abdominal Drainage) 07/07/17)  400  400      Output (ml) Nicklas Gemma Drain 07/07/17 Right Abdomen) 100 600 700 100  100      Output (ml) (Vacuum Assisted Closure Mid Abdominal) 100 300 400 75  75    Stool 0 0 0 0  0      Output (ml) (Ileostomy 06/07/00 Lower  Abdomen) 0 0 0 0  0    Shift Total  (mL/kg) 1375  (8.7) 2925  (18.5) 4300  (27.3) 975  (6.2)  975  (6.2)   NET -814.5 -1835 -2649.5 -975  -975   Weight (kg) 157.2 157.7 157.7 157.7 157.7 157.7       PE  Pulm - CTAB  CV - RRR  Abd - soft, ND, BS Present, WV intact on suction    Labs  Recent Results (from the past 12 hour(s))   METABOLIC PANEL, BASIC    Collection Time: 08/11/17  5:34 AM   Result Value Ref Range    Sodium 139 136 - 145 mmol/L    Potassium 4.3 3.5 - 5.1 mmol/L    Chloride 105 97 - 108 mmol/L    CO2 25 21 - 32 mmol/L    Anion gap 9 5 - 15 mmol/L    Glucose 118 (H) 65 - 100 mg/dL    BUN 21 (H) 6 - 20 MG/DL    Creatinine 0.68 0.55 - 1.02 MG/DL    BUN/Creatinine ratio 31 (H) 12 - 20      GFR est AA >60 >60 ml/min/1.73m2    GFR est non-AA >60 >60 ml/min/1.73m2    Calcium 9.0 8.5 - 10.1 MG/DL   MAGNESIUM    Collection Time: 08/11/17  5:34 AM   Result Value Ref Range    Magnesium 1.8 1.6 - 2.4 mg/dL   PHOSPHORUS    Collection Time: 08/11/17  5:34 AM   Result Value Ref Range    Phosphorus 4.1 2.6 - 4.7 MG/DL   CBC WITH AUTOMATED DIFF    Collection Time: 08/11/17  7:09 AM   Result Value Ref Range    WBC 7.0 3.6 - 11.0 K/uL    RBC 2.47 (L) 3.80 - 5.20 M/uL    HGB 7.0 (L) 11.5 - 16.0 g/dL    HCT 22.9 (L) 35.0 - 47.0 %    MCV 92.7 80.0 - 99.0 FL    MCH 28.3 26.0 - 34.0 PG    MCHC 30.6 30.0 - 36.5 g/dL    RDW 17.2 (H) 11.5 - 14.5 %    PLATELET 595 139 - 187 K/uL    NEUTROPHILS 56 32 - 75 %    LYMPHOCYTES 31 12 - 49 %    MONOCYTES 10 5 - 13 %    EOSINOPHILS 3 0 - 7 %    BASOPHILS 0 0 - 1 %    ABS. NEUTROPHILS 3.9 1.8 - 8.0 K/UL    ABS. LYMPHOCYTES 2.2 0.8 - 3.5 K/UL    ABS. MONOCYTES 0.7 0.0 - 1.0 K/UL    ABS. EOSINOPHILS 0.2 0.0 - 0.4 K/UL    ABS.  BASOPHILS 0.0 0.0 - 0.1 K/UL    DF SMEAR SCANNED      PLATELET COMMENTS LARGE PLATELETS      RBC COMMENTS ANISOCYTOSIS  1+        RBC COMMENTS POLYCHROMASIA  1+       GLUCOSE, POC    Collection Time: 08/11/17 12:02 PM   Result Value Ref Range    Glucose (POC) 98 65 - 100 mg/dL    Performed by [id number] on sips  TPN [facility] working well on suction  OOB  To SNF in the coming weeks    Mabelene Schaumann, MD

## 2017-08-11 NOTE — PROGRESS NOTES
Bedside shift change report given to [facility] MAURI CACERES by Christen Medel RN.  Report included the following information SBAR, Procedure Summary, MAR and Cardiac Rhythm ST.

## 2017-08-12 LAB
ANION GAP BLD CALC-SCNC: 8 MMOL/L (ref 5–15)
BASOPHILS # BLD AUTO: 0 K/UL (ref 0–0.1)
BASOPHILS # BLD: 0 % (ref 0–1)
BUN SERPL-MCNC: 20 MG/DL (ref 6–20)
BUN/CREAT SERPL: 30 (ref 12–20)
CALCIUM SERPL-MCNC: 9.1 MG/DL (ref 8.5–10.1)
CHLORIDE SERPL-SCNC: 106 MMOL/L (ref 97–108)
CO2 SERPL-SCNC: 25 MMOL/L (ref 21–32)
CREAT SERPL-MCNC: 0.67 MG/DL (ref 0.55–1.02)
EOSINOPHIL # BLD: 0.3 K/UL (ref 0–0.4)
EOSINOPHIL NFR BLD: 4 % (ref 0–7)
ERYTHROCYTE [DISTWIDTH] IN BLOOD BY AUTOMATED COUNT: 17.3 % (ref 11.5–14.5)
GLUCOSE BLD STRIP.AUTO-MCNC: 103 MG/DL (ref 65–100)
GLUCOSE BLD STRIP.AUTO-MCNC: 118 MG/DL (ref 65–100)
GLUCOSE BLD STRIP.AUTO-MCNC: 95 MG/DL (ref 65–100)
GLUCOSE SERPL-MCNC: 89 MG/DL (ref 65–100)
HCT VFR BLD AUTO: 25.4 % (ref 35–47)
HGB BLD-MCNC: 7.7 G/DL (ref 11.5–16)
LYMPHOCYTES # BLD AUTO: 36 % (ref 12–49)
LYMPHOCYTES # BLD: 3.3 K/UL (ref 0.8–3.5)
MAGNESIUM SERPL-MCNC: 1.9 MG/DL (ref 1.6–2.4)
MCH RBC QN AUTO: 28.1 PG (ref 26–34)
MCHC RBC AUTO-ENTMCNC: 30.3 G/DL (ref 30–36.5)
MCV RBC AUTO: 92.7 FL (ref 80–99)
MONOCYTES # BLD: 0.9 K/UL (ref 0–1)
MONOCYTES NFR BLD AUTO: 10 % (ref 5–13)
NEUTS SEG # BLD: 4.7 K/UL (ref 1.8–8)
NEUTS SEG NFR BLD AUTO: 50 % (ref 32–75)
PHOSPHATE SERPL-MCNC: 4.2 MG/DL (ref 2.6–4.7)
PLATELET # BLD AUTO: 416 K/UL (ref 150–400)
POTASSIUM SERPL-SCNC: 4.3 MMOL/L (ref 3.5–5.1)
RBC # BLD AUTO: 2.74 M/UL (ref 3.8–5.2)
SERVICE CMNT-IMP: ABNORMAL
SERVICE CMNT-IMP: ABNORMAL
SERVICE CMNT-IMP: NORMAL
SODIUM SERPL-SCNC: 139 MMOL/L (ref 136–145)
WBC # BLD AUTO: 9.2 K/UL (ref 3.6–11)

## 2017-08-12 PROCEDURE — 82962 GLUCOSE BLOOD TEST: CPT

## 2017-08-12 PROCEDURE — 74011250637 HC RX REV CODE- 250/637: Performed by: PHYSICIAN ASSISTANT

## 2017-08-12 PROCEDURE — 74011250636 HC RX REV CODE- 250/636: Performed by: NURSE PRACTITIONER

## 2017-08-12 PROCEDURE — 74011250636 HC RX REV CODE- 250/636: Performed by: INTERNAL MEDICINE

## 2017-08-12 PROCEDURE — 36592 COLLECT BLOOD FROM PICC: CPT

## 2017-08-12 PROCEDURE — 74011250637 HC RX REV CODE- 250/637: Performed by: SURGERY

## 2017-08-12 PROCEDURE — 74011000250 HC RX REV CODE- 250: Performed by: NURSE PRACTITIONER

## 2017-08-12 PROCEDURE — 65660000000 HC RM CCU STEPDOWN

## 2017-08-12 PROCEDURE — 74011250636 HC RX REV CODE- 250/636: Performed by: EMERGENCY MEDICINE

## 2017-08-12 PROCEDURE — 85025 COMPLETE CBC W/AUTO DIFF WBC: CPT | Performed by: SURGERY

## 2017-08-12 PROCEDURE — 74011250636 HC RX REV CODE- 250/636: Performed by: PHYSICAL MEDICINE & REHABILITATION

## 2017-08-12 PROCEDURE — 77030019952 HC CANSTR VAC ASST KCON -B

## 2017-08-12 PROCEDURE — 74011636637 HC RX REV CODE- 636/637: Performed by: NURSE PRACTITIONER

## 2017-08-12 PROCEDURE — 3331090002 HH PPS REVENUE DEBIT

## 2017-08-12 PROCEDURE — 36415 COLL VENOUS BLD VENIPUNCTURE: CPT | Performed by: SURGERY

## 2017-08-12 PROCEDURE — 83735 ASSAY OF MAGNESIUM: CPT | Performed by: SURGERY

## 2017-08-12 PROCEDURE — 84100 ASSAY OF PHOSPHORUS: CPT | Performed by: SURGERY

## 2017-08-12 PROCEDURE — 74011000258 HC RX REV CODE- 258: Performed by: NURSE PRACTITIONER

## 2017-08-12 PROCEDURE — 74011250636 HC RX REV CODE- 250/636: Performed by: SURGERY

## 2017-08-12 PROCEDURE — 80048 BASIC METABOLIC PNL TOTAL CA: CPT | Performed by: SURGERY

## 2017-08-12 PROCEDURE — 3331090001 HH PPS REVENUE CREDIT

## 2017-08-12 RX ADMIN — HYDROMORPHONE HYDROCHLORIDE 1 MG: 1 INJECTION, SOLUTION INTRAMUSCULAR; INTRAVENOUS; SUBCUTANEOUS at 11:44

## 2017-08-12 RX ADMIN — SODIUM CHLORIDE, SODIUM LACTATE, POTASSIUM CHLORIDE, AND CALCIUM CHLORIDE 25 ML/HR: 600; 310; 30; 20 INJECTION, SOLUTION INTRAVENOUS at 20:28

## 2017-08-12 RX ADMIN — Medication: at 20:07

## 2017-08-12 RX ADMIN — ONDANSETRON 8 MG: 2 INJECTION INTRAMUSCULAR; INTRAVENOUS at 13:36

## 2017-08-12 RX ADMIN — Medication 10 ML: at 06:02

## 2017-08-12 RX ADMIN — HYDROMORPHONE HYDROCHLORIDE 1 MG: 1 INJECTION, SOLUTION INTRAMUSCULAR; INTRAVENOUS; SUBCUTANEOUS at 21:02

## 2017-08-12 RX ADMIN — ASPIRIN 81 MG CHEWABLE TABLET 81 MG: 81 TABLET CHEWABLE at 08:35

## 2017-08-12 RX ADMIN — ONDANSETRON 8 MG: 2 INJECTION INTRAMUSCULAR; INTRAVENOUS at 09:56

## 2017-08-12 RX ADMIN — HYDROMORPHONE HYDROCHLORIDE 1 MG: 1 INJECTION, SOLUTION INTRAMUSCULAR; INTRAVENOUS; SUBCUTANEOUS at 16:58

## 2017-08-12 RX ADMIN — Medication 10 ML: at 13:35

## 2017-08-12 RX ADMIN — HYDROMORPHONE HYDROCHLORIDE 1 MG: 1 INJECTION, SOLUTION INTRAMUSCULAR; INTRAVENOUS; SUBCUTANEOUS at 06:01

## 2017-08-12 RX ADMIN — Medication 0.76 MG: at 06:01

## 2017-08-12 RX ADMIN — Medication 0.76 MG: at 13:35

## 2017-08-12 RX ADMIN — Medication 10 ML: at 02:29

## 2017-08-12 RX ADMIN — Medication: at 02:25

## 2017-08-12 RX ADMIN — NYSTATIN 500000 UNITS: 100000 SUSPENSION ORAL at 09:56

## 2017-08-12 RX ADMIN — POTASSIUM CHLORIDE: 2 INJECTION, SOLUTION, CONCENTRATE INTRAVENOUS at 19:30

## 2017-08-12 RX ADMIN — Medication: at 11:37

## 2017-08-12 RX ADMIN — NYSTATIN 500000 UNITS: 100000 SUSPENSION ORAL at 13:36

## 2017-08-12 RX ADMIN — PANTOPRAZOLE SODIUM 40 MG: 40 TABLET, DELAYED RELEASE ORAL at 08:23

## 2017-08-12 RX ADMIN — HYDROMORPHONE HYDROCHLORIDE 1 MG: 1 INJECTION, SOLUTION INTRAMUSCULAR; INTRAVENOUS; SUBCUTANEOUS at 02:27

## 2017-08-12 RX ADMIN — Medication 10 ML: at 21:02

## 2017-08-12 RX ADMIN — Medication 0.76 MG: at 21:02

## 2017-08-12 RX ADMIN — Medication 10 ML: at 17:00

## 2017-08-12 RX ADMIN — HYDROMORPHONE HYDROCHLORIDE 1 MG: 1 INJECTION, SOLUTION INTRAMUSCULAR; INTRAVENOUS; SUBCUTANEOUS at 19:26

## 2017-08-12 RX ADMIN — ONDANSETRON 8 MG: 2 INJECTION INTRAMUSCULAR; INTRAVENOUS at 22:11

## 2017-08-12 RX ADMIN — CLOPIDOGREL BISULFATE 75 MG: 75 TABLET ORAL at 08:34

## 2017-08-12 RX ADMIN — Medication 10 ML: at 13:36

## 2017-08-12 NOTE — PROGRESS NOTES
General Surgery Daily Progress Note    Admit Date: 2017  Post-Operative Day: 30 Days Post-Op from Procedure(s) with comments:  SPECIAL PROCEDURE OUTSIDE OF OR - central line placement     Subjective:     Last 24 hrs: Pt is doing well. Only complaint is not getting long periods of sleep. Objective:     Blood pressure 103/66, pulse (!) 109, temperature 98.4 °F (36.9 °C), resp. rate 18, height 5' 4\" (1.626 m), weight 341 lb 0.8 oz (154.7 kg), SpO2 95 %, not currently breastfeeding. Temp (24hrs), Av.5 °F (36.9 °C), Min:98.4 °F (36.9 °C), Max:98.8 °F (37.1 °C)      _____________________  Physical Exam:     sleepy, in no acute distress. Cardiovascular: RRR, no peripheral edema  Lungs:CTAB   Abdomen: WV intact, red rubber to drainage - brown-green drainage in bag. Ostomy w/ soft stool. Assessment:   Active Problems:    Abdominal pain (2017)      Small bowel ischemia ([address] (2017)      Overview: CT abd/pelvis 17      1. The stomach and proximal small bowel loops are distended. There is a       loop of      small bowel in the midabdomen which is mildly dilated and does not       demonstrate      enhancement in the wall and there is suspicion of pneumatosis and this       leads to      a loop of small bowel which demonstrates thickening of the wall and       findings are      suspicious for ischemia. 2. There is extensive mesenteric edema and a small amount of ascites in       the      pelvis. 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 There is nonocclusive thrombus in the SMA.             Enterocutaneous fistula (2017)            Plan:     Renew tpn  Am labs  OOB  Asa/plavix  PPI    Data Review:    Recent Labs      17   0426  17   0709  08/10/17   0402   WBC  9.2  7.0  8.5   HGB  7.7*  7.0*  7.7*   HCT  25.4*  22.9*  24.8*   PLT  416*  382  411*     Recent Labs      17   0426  17   0534 08/10/17   0402   NA  139  139  139   K  4.3  4.3  4.4   CL  106  105  106   CO2  25  25  25   GLU  89  118*  92   BUN  20  21*  20   CREA  0.67  0.68  0.65   CA  9.1  9.0  9.0   MG  1.9  1.8  1.8   PHOS  4.2  4.1  4.0     No results for input(s): AML, LPSE in the last 72 hours.         ______________________  Medications:    Current Facility-Administered Medications   Medication Dose Route Frequency    TPN ADULT - CENTRAL   IntraVENous TITRATE    TPN ADULT - CENTRAL   IntraVENous TITRATE    insulin lispro (HUMALOG) injection   SubCUTAneous BID    pantoprazole (PROTONIX) tablet 40 mg  40 mg Oral ACB    nystatin (MYCOSTATIN) 100,000 unit/mL oral suspension 500,000 Units  500,000 Units Oral QID    0.9% sodium chloride infusion 250 mL  250 mL IntraVENous PRN    acetaminophen (TYLENOL) tablet 650 mg  650 mg Oral Q4H PRN    LORazepam (ATIVAN) injection 0.76 mg  0.76 mg IntraVENous Q8H    clopidogrel (PLAVIX) tablet 75 mg  75 mg Oral DAILY    sodium chloride (NS) flush 10 mL  10 mL InterCATHeter Q24H    sodium chloride (NS) flush 10 mL  10 mL InterCATHeter PRN    sodium chloride (NS) flush 10-40 mL  10-40 mL InterCATHeter Q8H    alteplase (CATHFLO) 1 mg in sterile water (preservative free) 1 mL injection  1 mg InterCATHeter PRN    bacitracin 500 unit/gram packet 1 Packet  1 Packet Topical PRN    0.9% sodium chloride infusion 250 mL  250 mL IntraVENous PRN    lactated Ringers infusion  25 mL/hr IntraVENous CONTINUOUS    fat emulsion 20% (LIPOSYN, INTRALIPID) infusion 500 mL  500 mL IntraVENous Q MON, WED & FRI    HYDROmorphone (PF) (DILAUDID) injection 1 mg  1 mg IntraVENous Q1H PRN    aspirin chewable tablet 81 mg  81 mg Oral DAILY    morphine (PF)  mg/30 ml   IntraVENous CONTINUOUS    glucose chewable tablet 16 g  4 Tab Oral PRN    glucagon (GLUCAGEN) injection 1 mg  1 mg IntraMUSCular PRN    dextrose 10 % infusion 125-250 mL  125-250 mL IntraVENous PRN    prochlorperazine (COMPAZINE) with saline injection 5 mg  5 mg IntraVENous Q6H PRN    ondansetron (ZOFRAN) injection 8 mg  8 mg IntraVENous Q6H PRN    sodium chloride (NS) flush 5-10 mL  5-10 mL IntraVENous Q8H    sodium chloride (NS) flush 5-10 mL  5-10 mL IntraVENous PRN    naloxone (NARCAN) injection 0.4 mg  0.4 mg IntraVENous PRN    diphenhydrAMINE (BENADRYL) injection 12.5 mg  12.5 mg IntraVENous Q4H PRN       Gianni Rosen NP  8/12/2017

## 2017-08-12 NOTE — PROGRESS NOTES
Progress Note    Patient: Linda Otoole MRN: [id number]  SSN: [id number]    YOB: 1975  Age: 46 y.o. Sex: female      Admit Date: 2017    30 Days Post-Op    Procedure:  Procedure(s):  SPECIAL PROCEDURE OUTSIDE OF OR    Subjective:     No acute surgical issues. Pt with minimal leakage from left side of abdominal wound. Pain is under control.     Objective:     Visit Vitals    /75 (BP 1 Location: Left arm, BP Patient Position: At rest)    Pulse (!) 105    Temp 99.2 °F (37.3 °C)    Resp 18    Ht 5' 4\" (1.626 m)    Wt 341 lb 0.8 oz (154.7 kg)    SpO2 99%    Breastfeeding No    BMI 58.54 kg/m2       Temp (24hrs), Av.6 °F (37 °C), Min:98.4 °F (36.9 °C), Max:99.2 °F (37.3 °C)        Physical Exam:    Gen:  NAD  Pulm:  Unlabored  Abd:  S/ND/appropriate TTP  Wound:  Semi bilious drainage from fred drain and wound vac    Recent Results (from the past 24 hour(s))   GLUCOSE, POC    Collection Time: 17 10:35 PM   Result Value Ref Range    Glucose (POC) 124 (H) 65 - 100 mg/dL    Performed by Anabel Hale    METABOLIC PANEL, BASIC    Collection Time: 17  4:26 AM   Result Value Ref Range    Sodium 139 136 - 145 mmol/L    Potassium 4.3 3.5 - 5.1 mmol/L    Chloride 106 97 - 108 mmol/L    CO2 25 21 - 32 mmol/L    Anion gap 8 5 - 15 mmol/L    Glucose 89 65 - 100 mg/dL    BUN 20 6 - 20 MG/DL    Creatinine 0.67 0.55 - 1.02 MG/DL    BUN/Creatinine ratio 30 (H) 12 - 20      GFR est AA >60 >60 ml/min/1.73m2    GFR est non-AA >60 >60 ml/min/1.73m2    Calcium 9.1 8.5 - 10.1 MG/DL   CBC WITH AUTOMATED DIFF    Collection Time: 17  4:26 AM   Result Value Ref Range    WBC 9.2 3.6 - 11.0 K/uL    RBC 2.74 (L) 3.80 - 5.20 M/uL    HGB 7.7 (L) 11.5 - 16.0 g/dL    HCT 25.4 (L) 35.0 - 47.0 %    MCV 92.7 80.0 - 99.0 FL    MCH 28.1 26.0 - 34.0 PG    MCHC 30.3 30.0 - 36.5 g/dL    RDW 17.3 (H) 11.5 - 14.5 %    PLATELET 646 (H) 852 - 400 K/uL    NEUTROPHILS 50 32 - 75 %    LYMPHOCYTES 36 12 - 49 %    MONOCYTES 10 5 - 13 %    EOSINOPHILS 4 0 - 7 %    BASOPHILS 0 0 - 1 %    ABS. NEUTROPHILS 4.7 1.8 - 8.0 K/UL    ABS. LYMPHOCYTES 3.3 0.8 - 3.5 K/UL    ABS. MONOCYTES 0.9 0.0 - 1.0 K/UL    ABS. EOSINOPHILS 0.3 0.0 - 0.4 K/UL    ABS. BASOPHILS 0.0 0.0 - 0.1 K/UL   MAGNESIUM    Collection Time: 08/12/17  4:26 AM   Result Value Ref Range    Magnesium 1.9 1.6 - 2.4 mg/dL   PHOSPHORUS    Collection Time: 08/12/17  4:26 AM   Result Value Ref Range    Phosphorus 4.2 2.6 - 4.7 MG/DL   GLUCOSE, POC    Collection Time: 08/12/17  6:18 AM   Result Value Ref Range    Glucose (POC) 118 (H) 65 - 100 mg/dL    Performed by [address] [place] [address], POC    Collection Time: 08/12/17 10:32 AM   Result Value Ref Range    Glucose (POC) 95 65 - 100 mg/dL    Performed by Moncho Fuentes            Assessment:     Hospital Problems  Date Reviewed: 6/28/2017          Codes Class Noted POA    Enterocutaneous fistula ICD-10-CM: K63.2  ICD-9-CM: 569.81  6/30/2017 No        Small bowel ischemia ([facility] ICD-10-CM: K55.9  ICD-9-CM: 557.9  6/28/2017 Clinically Undetermined    Overview Signed 7/3/2017  5:08 PM by Maksim Machado MD     CT abd/pelvis 6/28/17  1. The stomach and proximal small bowel loops are distended. There is a loop of  small bowel in the midabdomen which is mildly dilated and does not demonstrate  enhancement in the wall and there is suspicion of pneumatosis and this leads to  a loop of small bowel which demonstrates thickening of the wall and findings are  suspicious for ischemia. 2. There is extensive mesenteric edema and a small amount of ascites in the  pelvis. Superior mesenteric artery thrombosis [facility]) ICD-10-CM: K55.069  ICD-9-CM: 557.0  6/28/2017 Clinically Undetermined    Overview Signed 7/3/2017  5:09 PM by Maksim Machado MD     CT abd/pelvis 6/28/17:  3. There is nonocclusive thrombus in the SMA.                Abdominal pain ICD-10-CM: R10.9  ICD-9-CM: 789.00  6/25/2017 Yes Plan/Recommendations/Medical Decision Making:     - Continue TPN  - Pain control  - Continue wound care  - Out of bed as tolerated  - Hopefully DC to rehab within next 2 weeks    Signed By: Roe Reyes MD     August 12, 2017

## 2017-08-12 NOTE — PROGRESS NOTES
Problem: Pressure Injury - Risk of  Goal: *Prevention of pressure ulcer  Outcome: Progressing Towards Goal  Patient repositioned from bed to chair today to prevent skin breakdown.

## 2017-08-12 NOTE — ROUTINE PROCESS
Bedside shift change report given to MAURI Fuentes and Roman Mims RN (oncoming nurse) by Chanda Cordova RN (offgoing nurse). Report included the following information SBAR, Procedure Summary, Intake/Output, MAR, Recent Results and Med Rec Status.

## 2017-08-12 NOTE — PROGRESS NOTES
Bedside shift change report given to Ludwig Holland RN by Cecile Kirkpatrick RN.  Report included the following information SBAR, Procedure Summary, Intake/Output, MAR and Cardiac Rhythm ST.

## 2017-08-13 LAB
ANION GAP BLD CALC-SCNC: 8 MMOL/L (ref 5–15)
BASOPHILS # BLD AUTO: 0 K/UL (ref 0–0.1)
BASOPHILS # BLD: 0 % (ref 0–1)
BUN SERPL-MCNC: 16 MG/DL (ref 6–20)
BUN/CREAT SERPL: 20 (ref 12–20)
CALCIUM SERPL-MCNC: 8.8 MG/DL (ref 8.5–10.1)
CHLORIDE SERPL-SCNC: 105 MMOL/L (ref 97–108)
CO2 SERPL-SCNC: 26 MMOL/L (ref 21–32)
CREAT SERPL-MCNC: 0.79 MG/DL (ref 0.55–1.02)
EOSINOPHIL # BLD: 0.3 K/UL (ref 0–0.4)
EOSINOPHIL NFR BLD: 4 % (ref 0–7)
ERYTHROCYTE [DISTWIDTH] IN BLOOD BY AUTOMATED COUNT: 16.8 % (ref 11.5–14.5)
GLUCOSE BLD STRIP.AUTO-MCNC: 121 MG/DL (ref 65–100)
GLUCOSE BLD STRIP.AUTO-MCNC: 122 MG/DL (ref 65–100)
GLUCOSE SERPL-MCNC: 87 MG/DL (ref 65–100)
HCT VFR BLD AUTO: 24.9 % (ref 35–47)
HGB BLD-MCNC: 7.9 G/DL (ref 11.5–16)
LYMPHOCYTES # BLD AUTO: 38 % (ref 12–49)
LYMPHOCYTES # BLD: 3.1 K/UL (ref 0.8–3.5)
MAGNESIUM SERPL-MCNC: 1.9 MG/DL (ref 1.6–2.4)
MCH RBC QN AUTO: 28.9 PG (ref 26–34)
MCHC RBC AUTO-ENTMCNC: 31.7 G/DL (ref 30–36.5)
MCV RBC AUTO: 91.2 FL (ref 80–99)
MONOCYTES # BLD: 0.7 K/UL (ref 0–1)
MONOCYTES NFR BLD AUTO: 9 % (ref 5–13)
NEUTS SEG # BLD: 4 K/UL (ref 1.8–8)
NEUTS SEG NFR BLD AUTO: 49 % (ref 32–75)
PHOSPHATE SERPL-MCNC: 4.4 MG/DL (ref 2.6–4.7)
PLATELET # BLD AUTO: 434 K/UL (ref 150–400)
POTASSIUM SERPL-SCNC: 3.9 MMOL/L (ref 3.5–5.1)
RBC # BLD AUTO: 2.73 M/UL (ref 3.8–5.2)
SERVICE CMNT-IMP: ABNORMAL
SERVICE CMNT-IMP: ABNORMAL
SODIUM SERPL-SCNC: 139 MMOL/L (ref 136–145)
WBC # BLD AUTO: 8.1 K/UL (ref 3.6–11)

## 2017-08-13 PROCEDURE — 85025 COMPLETE CBC W/AUTO DIFF WBC: CPT | Performed by: SURGERY

## 2017-08-13 PROCEDURE — 74011250636 HC RX REV CODE- 250/636: Performed by: INTERNAL MEDICINE

## 2017-08-13 PROCEDURE — 83735 ASSAY OF MAGNESIUM: CPT | Performed by: SURGERY

## 2017-08-13 PROCEDURE — 74011250636 HC RX REV CODE- 250/636: Performed by: SURGERY

## 2017-08-13 PROCEDURE — 84100 ASSAY OF PHOSPHORUS: CPT | Performed by: SURGERY

## 2017-08-13 PROCEDURE — 74011250637 HC RX REV CODE- 250/637: Performed by: SURGERY

## 2017-08-13 PROCEDURE — 80048 BASIC METABOLIC PNL TOTAL CA: CPT | Performed by: SURGERY

## 2017-08-13 PROCEDURE — 74011250637 HC RX REV CODE- 250/637: Performed by: PHYSICIAN ASSISTANT

## 2017-08-13 PROCEDURE — 36415 COLL VENOUS BLD VENIPUNCTURE: CPT | Performed by: SURGERY

## 2017-08-13 PROCEDURE — 82962 GLUCOSE BLOOD TEST: CPT

## 2017-08-13 PROCEDURE — 77030019952 HC CANSTR VAC ASST KCON -B

## 2017-08-13 PROCEDURE — 3331090001 HH PPS REVENUE CREDIT

## 2017-08-13 PROCEDURE — 3331090002 HH PPS REVENUE DEBIT

## 2017-08-13 PROCEDURE — 77030020847 HC STATLOK BARD -A

## 2017-08-13 PROCEDURE — 74011000250 HC RX REV CODE- 250: Performed by: INTERNAL MEDICINE

## 2017-08-13 PROCEDURE — 74011636637 HC RX REV CODE- 636/637: Performed by: SURGERY

## 2017-08-13 PROCEDURE — 65660000000 HC RM CCU STEPDOWN

## 2017-08-13 PROCEDURE — 36592 COLLECT BLOOD FROM PICC: CPT

## 2017-08-13 PROCEDURE — 74011000258 HC RX REV CODE- 258: Performed by: SURGERY

## 2017-08-13 PROCEDURE — 74011250636 HC RX REV CODE- 250/636: Performed by: PHYSICAL MEDICINE & REHABILITATION

## 2017-08-13 PROCEDURE — 74011250636 HC RX REV CODE- 250/636: Performed by: NURSE PRACTITIONER

## 2017-08-13 PROCEDURE — 74011000250 HC RX REV CODE- 250: Performed by: SURGERY

## 2017-08-13 PROCEDURE — 74011250636 HC RX REV CODE- 250/636: Performed by: EMERGENCY MEDICINE

## 2017-08-13 RX ADMIN — ASPIRIN 81 MG CHEWABLE TABLET 81 MG: 81 TABLET CHEWABLE at 08:35

## 2017-08-13 RX ADMIN — Medication 0.76 MG: at 14:00

## 2017-08-13 RX ADMIN — HYDROMORPHONE HYDROCHLORIDE 1 MG: 1 INJECTION, SOLUTION INTRAMUSCULAR; INTRAVENOUS; SUBCUTANEOUS at 21:21

## 2017-08-13 RX ADMIN — Medication 10 ML: at 21:27

## 2017-08-13 RX ADMIN — HYDROMORPHONE HYDROCHLORIDE 1 MG: 1 INJECTION, SOLUTION INTRAMUSCULAR; INTRAVENOUS; SUBCUTANEOUS at 06:38

## 2017-08-13 RX ADMIN — SODIUM CHLORIDE, SODIUM LACTATE, POTASSIUM CHLORIDE, AND CALCIUM CHLORIDE 25 ML/HR: 600; 310; 30; 20 INJECTION, SOLUTION INTRAVENOUS at 10:37

## 2017-08-13 RX ADMIN — Medication 10 ML: at 06:38

## 2017-08-13 RX ADMIN — HYDROMORPHONE HYDROCHLORIDE 1 MG: 1 INJECTION, SOLUTION INTRAMUSCULAR; INTRAVENOUS; SUBCUTANEOUS at 16:14

## 2017-08-13 RX ADMIN — HYDROMORPHONE HYDROCHLORIDE 1 MG: 1 INJECTION, SOLUTION INTRAMUSCULAR; INTRAVENOUS; SUBCUTANEOUS at 12:44

## 2017-08-13 RX ADMIN — ONDANSETRON 8 MG: 2 INJECTION INTRAMUSCULAR; INTRAVENOUS at 13:24

## 2017-08-13 RX ADMIN — Medication 10 ML: at 17:49

## 2017-08-13 RX ADMIN — HYDROMORPHONE HYDROCHLORIDE 1 MG: 1 INJECTION, SOLUTION INTRAMUSCULAR; INTRAVENOUS; SUBCUTANEOUS at 03:28

## 2017-08-13 RX ADMIN — Medication: at 21:20

## 2017-08-13 RX ADMIN — Medication 10 ML: at 06:16

## 2017-08-13 RX ADMIN — Medication 0.76 MG: at 21:22

## 2017-08-13 RX ADMIN — HYDROMORPHONE HYDROCHLORIDE 1 MG: 1 INJECTION, SOLUTION INTRAMUSCULAR; INTRAVENOUS; SUBCUTANEOUS at 17:49

## 2017-08-13 RX ADMIN — CLOPIDOGREL BISULFATE 75 MG: 75 TABLET ORAL at 08:35

## 2017-08-13 RX ADMIN — ONDANSETRON 8 MG: 2 INJECTION INTRAMUSCULAR; INTRAVENOUS at 21:33

## 2017-08-13 RX ADMIN — NYSTATIN 500000 UNITS: 100000 SUSPENSION ORAL at 21:26

## 2017-08-13 RX ADMIN — Medication 10 ML: at 14:00

## 2017-08-13 RX ADMIN — PANTOPRAZOLE SODIUM 40 MG: 40 TABLET, DELAYED RELEASE ORAL at 07:26

## 2017-08-13 RX ADMIN — HYDROMORPHONE HYDROCHLORIDE 1 MG: 1 INJECTION, SOLUTION INTRAMUSCULAR; INTRAVENOUS; SUBCUTANEOUS at 00:10

## 2017-08-13 RX ADMIN — SODIUM CHLORIDE 5 MG: 9 INJECTION INTRAMUSCULAR; INTRAVENOUS; SUBCUTANEOUS at 00:03

## 2017-08-13 RX ADMIN — Medication 0.76 MG: at 06:14

## 2017-08-13 RX ADMIN — Medication: at 14:05

## 2017-08-13 RX ADMIN — Medication: at 06:12

## 2017-08-13 RX ADMIN — HYDROMORPHONE HYDROCHLORIDE 1 MG: 1 INJECTION, SOLUTION INTRAMUSCULAR; INTRAVENOUS; SUBCUTANEOUS at 08:35

## 2017-08-13 RX ADMIN — POTASSIUM CHLORIDE: 2 INJECTION, SOLUTION, CONCENTRATE INTRAVENOUS at 18:03

## 2017-08-13 NOTE — PROGRESS NOTES
Bedside shift change report given to Michael Santos RN (oncoming nurse) by Gael Bautista RN (offgoing nurse). Report included the following information SBAR, Kardex and Recent Results.

## 2017-08-13 NOTE — PROGRESS NOTES
08/13/17 0346   Vital Signs   Temp 98.9 °F (37.2 °C)   Temp Source Oral   Pulse (Heart Rate) (!) 114   Heart Rate Source Monitor   Resp Rate 20   O2 Sat (%) 100 %   Level of Consciousness Alert   /71   MAP (Calculated) 83   BP 1 Method Manual   BP 1 Location Left arm   BP Patient Position At rest   MEWS Score 3   Alarms Set and Audible Cardiac alarms   Box Number 435   Electrodes Replaced No   Pain 1   Pain Scale 1 Numeric (0 - 10)   Pain Intensity 1 9   Patient Stated Pain Goal 3   Pain Onset 1 continuous   Pain Location 1 Abdomen   Pain Orientation 1 Anterior   Pain Description 1 Aching; Sharp   Pain Intervention(s) 1 Medication (see MAR)   POSS Scale   Opioid Sedation Scale 1   Oxygen Therapy   O2 Device Room air   Patient Observation   Special Appliances/Equipment Bed (comment)   Patient Turned Turns self;Supine   Observations pt resting in bed   Activity Family/Visitors present   MEWS 3 d/t sinus tachycardia which is baseline for patient.

## 2017-08-13 NOTE — PROGRESS NOTES
Progress Note    Patient: Edel Desouza MRN: [id number]  SSN: [id number]    YOB: 1975  Age: 46 y.o. Sex: female      Admit Date: 2017    31 Days Post-Op    Procedure:  Procedure(s):  SPECIAL PROCEDURE OUTSIDE OF OR    Subjective:     No acute surgical issues. Pt doing okay. Pain is under control. Abdominal wound with minimal leakage from left side. Objective:     Visit Vitals    /61 (BP 1 Location: Left arm, BP Patient Position: At rest)    Pulse (!) 109    Temp 98.4 °F (36.9 °C)    Resp 20    Ht 5' 4\" (1.626 m)    Wt 346 lb 2 oz (157 kg)    SpO2 99%    Breastfeeding No    BMI 59.41 kg/m2       Temp (24hrs), Av.7 °F (37.1 °C), Min:98.4 °F (36.9 °C), Max:99.2 °F (37.3 °C)        Physical Exam:    Gen:  NAD  Pulm:  Unlabored  Abd:  S/ND/appropriate TTP  Wound:  Semi bilious drainage from fred drain and wound vac    Recent Results (from the past 24 hour(s))   GLUCOSE, POC    Collection Time: 17 10:32 AM   Result Value Ref Range    Glucose (POC) 95 65 - 100 mg/dL    Performed by Candy Brunner Rashidah    GLUCOSE, POC    Collection Time: 17  8:54 PM   Result Value Ref Range    Glucose (POC) 103 (H) 65 - 100 mg/dL    Performed by Steve ARGUELLO    CBC WITH AUTOMATED DIFF    Collection Time: 17  4:11 AM   Result Value Ref Range    WBC 8.1 3.6 - 11.0 K/uL    RBC 2.73 (L) 3.80 - 5.20 M/uL    HGB 7.9 (L) 11.5 - 16.0 g/dL    HCT 24.9 (L) 35.0 - 47.0 %    MCV 91.2 80.0 - 99.0 FL    MCH 28.9 26.0 - 34.0 PG    MCHC 31.7 30.0 - 36.5 g/dL    RDW 16.8 (H) 11.5 - 14.5 %    PLATELET 559 (H) 768 - 400 K/uL    NEUTROPHILS 49 32 - 75 %    LYMPHOCYTES 38 12 - 49 %    MONOCYTES 9 5 - 13 %    EOSINOPHILS 4 0 - 7 %    BASOPHILS 0 0 - 1 %    ABS. NEUTROPHILS 4.0 1.8 - 8.0 K/UL    ABS. LYMPHOCYTES 3.1 0.8 - 3.5 K/UL    ABS. MONOCYTES 0.7 0.0 - 1.0 K/UL    ABS. EOSINOPHILS 0.3 0.0 - 0.4 K/UL    ABS.  BASOPHILS 0.0 0.0 - 0.1 K/UL   METABOLIC PANEL, BASIC    Collection Time: 08/13/17  4:11 AM   Result Value Ref Range    Sodium 139 136 - 145 mmol/L    Potassium 3.9 3.5 - 5.1 mmol/L    Chloride 105 97 - 108 mmol/L    CO2 26 21 - 32 mmol/L    Anion gap 8 5 - 15 mmol/L    Glucose 87 65 - 100 mg/dL    BUN 16 6 - 20 MG/DL    Creatinine 0.79 0.55 - 1.02 MG/DL    BUN/Creatinine ratio 20 12 - 20      GFR est AA >60 >60 ml/min/1.73m2    GFR est non-AA >60 >60 ml/min/1.73m2    Calcium 8.8 8.5 - 10.1 MG/DL   MAGNESIUM    Collection Time: 08/13/17  4:11 AM   Result Value Ref Range    Magnesium 1.9 1.6 - 2.4 mg/dL   PHOSPHORUS    Collection Time: 08/13/17  4:11 AM   Result Value Ref Range    Phosphorus 4.4 2.6 - 4.7 MG/DL           Assessment:     Hospital Problems  Date Reviewed: 6/28/2017          Codes Class Noted POA    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 The stomach and proximal small bowel loops are distended. There is a loop of  small bowel in the midabdomen which is mildly dilated and does not demonstrate  enhancement in the wall and there is suspicion of pneumatosis and this leads to  a loop of small bowel which demonstrates thickening of the wall and findings are  suspicious for ischemia. 2. There is extensive mesenteric edema and a small amount of ascites in the  pelvis. Superior mesenteric artery thrombosis [facility]) ICD-10-CM: K55.069  ICD-9-CM: 557.0  6/28/2017 Clinically Undetermined    Overview Signed 7/3/2017  5:09 PM by Sarai Clinton MD     CT abd/pelvis 6/28/17:  3. There is nonocclusive thrombus in the SMA.                Abdominal pain ICD-10-CM: R10.9  ICD-9-CM: 789.00  6/25/2017 Yes              Plan/Recommendations/Medical Decision Making:     - Continue TPN  - Pain control  - Continue wound care  - Out of bed as tolerated  - Hopefully DC to rehab within next 2 weeks    Signed By: Corie Singh MD     August 13, 2017

## 2017-08-13 NOTE — ROUTINE PROCESS
Bedside shift change report given to [address] [place] [address] Floor oncoming nurse by Kaylen DOTSON (offgoing nurse).  Report included the following information SBAR, Intake/Output, MAR, Recent Results and Cardiac Rhythm ST.

## 2017-08-14 LAB
ANION GAP BLD CALC-SCNC: 7 MMOL/L (ref 5–15)
BASOPHILS # BLD AUTO: 0 K/UL (ref 0–0.1)
BASOPHILS # BLD: 0 % (ref 0–1)
BUN SERPL-MCNC: 15 MG/DL (ref 6–20)
BUN/CREAT SERPL: 23 (ref 12–20)
CALCIUM SERPL-MCNC: 8.2 MG/DL (ref 8.5–10.1)
CHLORIDE SERPL-SCNC: 107 MMOL/L (ref 97–108)
CO2 SERPL-SCNC: 26 MMOL/L (ref 21–32)
CREAT SERPL-MCNC: 0.64 MG/DL (ref 0.55–1.02)
EOSINOPHIL # BLD: 0.2 K/UL (ref 0–0.4)
EOSINOPHIL NFR BLD: 2 % (ref 0–7)
ERYTHROCYTE [DISTWIDTH] IN BLOOD BY AUTOMATED COUNT: 16.4 % (ref 11.5–14.5)
GLUCOSE BLD STRIP.AUTO-MCNC: 101 MG/DL (ref 65–100)
GLUCOSE BLD STRIP.AUTO-MCNC: 135 MG/DL (ref 65–100)
GLUCOSE SERPL-MCNC: 102 MG/DL (ref 65–100)
HCT VFR BLD AUTO: 22.5 % (ref 35–47)
HGB BLD-MCNC: 7 G/DL (ref 11.5–16)
LYMPHOCYTES # BLD AUTO: 31 % (ref 12–49)
LYMPHOCYTES # BLD: 2.6 K/UL (ref 0.8–3.5)
MAGNESIUM SERPL-MCNC: 1.6 MG/DL (ref 1.6–2.4)
MCH RBC QN AUTO: 28.6 PG (ref 26–34)
MCHC RBC AUTO-ENTMCNC: 31.1 G/DL (ref 30–36.5)
MCV RBC AUTO: 91.8 FL (ref 80–99)
MONOCYTES # BLD: 0.8 K/UL (ref 0–1)
MONOCYTES NFR BLD AUTO: 10 % (ref 5–13)
NEUTS SEG # BLD: 4.7 K/UL (ref 1.8–8)
NEUTS SEG NFR BLD AUTO: 57 % (ref 32–75)
PHOSPHATE SERPL-MCNC: 4 MG/DL (ref 2.6–4.7)
PLATELET # BLD AUTO: 394 K/UL (ref 150–400)
POTASSIUM SERPL-SCNC: 4.1 MMOL/L (ref 3.5–5.1)
RBC # BLD AUTO: 2.45 M/UL (ref 3.8–5.2)
SERVICE CMNT-IMP: ABNORMAL
SERVICE CMNT-IMP: ABNORMAL
SODIUM SERPL-SCNC: 140 MMOL/L (ref 136–145)
WBC # BLD AUTO: 8.3 K/UL (ref 3.6–11)

## 2017-08-14 PROCEDURE — 97606 NEG PRS WND THER DME>50 SQCM: CPT

## 2017-08-14 PROCEDURE — 74011000250 HC RX REV CODE- 250: Performed by: SURGERY

## 2017-08-14 PROCEDURE — 97116 GAIT TRAINING THERAPY: CPT

## 2017-08-14 PROCEDURE — 74011250636 HC RX REV CODE- 250/636: Performed by: INTERNAL MEDICINE

## 2017-08-14 PROCEDURE — 74011250636 HC RX REV CODE- 250/636: Performed by: EMERGENCY MEDICINE

## 2017-08-14 PROCEDURE — 77030010520

## 2017-08-14 PROCEDURE — 3331090002 HH PPS REVENUE DEBIT

## 2017-08-14 PROCEDURE — 74011250636 HC RX REV CODE- 250/636: Performed by: SURGERY

## 2017-08-14 PROCEDURE — 83735 ASSAY OF MAGNESIUM: CPT | Performed by: SURGERY

## 2017-08-14 PROCEDURE — 77030019952 HC CANSTR VAC ASST KCON -B

## 2017-08-14 PROCEDURE — 85025 COMPLETE CBC W/AUTO DIFF WBC: CPT | Performed by: SURGERY

## 2017-08-14 PROCEDURE — 74011250636 HC RX REV CODE- 250/636: Performed by: PHYSICAL MEDICINE & REHABILITATION

## 2017-08-14 PROCEDURE — 74011250637 HC RX REV CODE- 250/637: Performed by: SURGERY

## 2017-08-14 PROCEDURE — 74011000258 HC RX REV CODE- 258: Performed by: SURGERY

## 2017-08-14 PROCEDURE — 3331090001 HH PPS REVENUE CREDIT

## 2017-08-14 PROCEDURE — 80048 BASIC METABOLIC PNL TOTAL CA: CPT | Performed by: SURGERY

## 2017-08-14 PROCEDURE — 77030010541

## 2017-08-14 PROCEDURE — 77030018717 HC DRSG GRNUFM KCON -B

## 2017-08-14 PROCEDURE — 74011000250 HC RX REV CODE- 250: Performed by: NURSE PRACTITIONER

## 2017-08-14 PROCEDURE — 84100 ASSAY OF PHOSPHORUS: CPT | Performed by: SURGERY

## 2017-08-14 PROCEDURE — 77030011641 HC PASTE OST ADH BMS -A

## 2017-08-14 PROCEDURE — 74011250636 HC RX REV CODE- 250/636: Performed by: NURSE PRACTITIONER

## 2017-08-14 PROCEDURE — 74011636637 HC RX REV CODE- 636/637: Performed by: SURGERY

## 2017-08-14 PROCEDURE — 77030010522

## 2017-08-14 PROCEDURE — 82962 GLUCOSE BLOOD TEST: CPT

## 2017-08-14 PROCEDURE — 36415 COLL VENOUS BLD VENIPUNCTURE: CPT | Performed by: SURGERY

## 2017-08-14 PROCEDURE — 65660000000 HC RM CCU STEPDOWN

## 2017-08-14 RX ORDER — MAGNESIUM SULFATE HEPTAHYDRATE 40 MG/ML
2 INJECTION, SOLUTION INTRAVENOUS ONCE
Status: COMPLETED | OUTPATIENT
Start: 2017-08-14 | End: 2017-08-14

## 2017-08-14 RX ADMIN — Medication 10 ML: at 14:02

## 2017-08-14 RX ADMIN — ONDANSETRON 8 MG: 2 INJECTION INTRAMUSCULAR; INTRAVENOUS at 03:45

## 2017-08-14 RX ADMIN — Medication 10 ML: at 21:58

## 2017-08-14 RX ADMIN — Medication 0.76 MG: at 13:31

## 2017-08-14 RX ADMIN — HYDROMORPHONE HYDROCHLORIDE 1 MG: 1 INJECTION, SOLUTION INTRAMUSCULAR; INTRAVENOUS; SUBCUTANEOUS at 00:56

## 2017-08-14 RX ADMIN — Medication: at 16:02

## 2017-08-14 RX ADMIN — Medication 10 ML: at 05:53

## 2017-08-14 RX ADMIN — Medication 0.76 MG: at 05:53

## 2017-08-14 RX ADMIN — ASPIRIN 81 MG CHEWABLE TABLET 81 MG: 81 TABLET CHEWABLE at 09:21

## 2017-08-14 RX ADMIN — ONDANSETRON 8 MG: 2 INJECTION INTRAMUSCULAR; INTRAVENOUS at 23:38

## 2017-08-14 RX ADMIN — HYDROMORPHONE HYDROCHLORIDE 1 MG: 1 INJECTION, SOLUTION INTRAMUSCULAR; INTRAVENOUS; SUBCUTANEOUS at 23:38

## 2017-08-14 RX ADMIN — POTASSIUM CHLORIDE: 2 INJECTION, SOLUTION, CONCENTRATE INTRAVENOUS at 18:05

## 2017-08-14 RX ADMIN — PANTOPRAZOLE SODIUM 40 MG: 40 TABLET, DELAYED RELEASE ORAL at 09:21

## 2017-08-14 RX ADMIN — HYDROMORPHONE HYDROCHLORIDE 1 MG: 1 INJECTION, SOLUTION INTRAMUSCULAR; INTRAVENOUS; SUBCUTANEOUS at 09:44

## 2017-08-14 RX ADMIN — HYDROMORPHONE HYDROCHLORIDE 1 MG: 1 INJECTION, SOLUTION INTRAMUSCULAR; INTRAVENOUS; SUBCUTANEOUS at 18:00

## 2017-08-14 RX ADMIN — ONDANSETRON 8 MG: 2 INJECTION INTRAMUSCULAR; INTRAVENOUS at 09:17

## 2017-08-14 RX ADMIN — HYDROMORPHONE HYDROCHLORIDE 1 MG: 1 INJECTION, SOLUTION INTRAMUSCULAR; INTRAVENOUS; SUBCUTANEOUS at 20:24

## 2017-08-14 RX ADMIN — I.V. FAT EMULSION 500 ML: 20 EMULSION INTRAVENOUS at 18:07

## 2017-08-14 RX ADMIN — Medication 10 ML: at 14:01

## 2017-08-14 RX ADMIN — MAGNESIUM SULFATE HEPTAHYDRATE 2 G: 40 INJECTION, SOLUTION INTRAVENOUS at 09:34

## 2017-08-14 RX ADMIN — ACETAMINOPHEN 650 MG: 325 TABLET, FILM COATED ORAL at 16:14

## 2017-08-14 RX ADMIN — Medication 10 ML: at 18:09

## 2017-08-14 RX ADMIN — Medication: at 23:37

## 2017-08-14 RX ADMIN — Medication 0.76 MG: at 21:56

## 2017-08-14 RX ADMIN — CLOPIDOGREL BISULFATE 75 MG: 75 TABLET ORAL at 09:21

## 2017-08-14 RX ADMIN — Medication: at 05:57

## 2017-08-14 NOTE — WOUND CARE
WOCN Note:     Follow-up visit for NPWT to abdomen.       Chart shows:  Admitted for nausea, vomiting, and abdominal pain; history of Crohn's, DVT, bowel perforation with surgery 6/7/17. Exploratory lap, KATHRINE, fistula exclusion and tube placement by Dr. Salena Espinosa 7/7/17.      Assessment:   Patient is A&O x4 and mobile around room.    Bed: total care bariatric sport  Patient using PureWick.    Diet: NPO with sips & TPN  Pre-medicated for pain by RN and using PCA throughout visit.      Mucus fistula LLQ: red & moist and almost at skin level; some mucosal transplantation noted; output is light green mucus. Activelife flat pouch changed today.      LUQ red rubber catheter to bedside bag. Little bilious output in bag.  The insertion of this catheter is seen on the left margin of wound bed into proximal lumen of small intestine. Effluent leaks around cath insertion.       Insertion site of [place] drain (now removed) in RUQ = 0.8 x 0.8 x 0.7 cm.  100% moist red. Moist plug of packing placed.        Rabia drain in RLQ with it's end resting in wound bed.  Trimmed today to keep within wound margins.      1. Midline abdominal dehisced incision = 11 x 7 x 3 cm (remarkably smaller).   Ana. Base is 70% moist red mucosal tissue with peristalsis & 30% moist pink with some mucus. Dressed using 1 piece of petroleum gauze folded over to create several layer to protect bowel and 1 black sponge. Rosan Channing drain rests between petroleum gauze and black sponge. Rabia drain to wall suction. VAC @ 125 mmHg continuous suction.        Recommendations:    Maintain VAC @ 125 mmHg continuous suction and Rabia drain to wall suction. Please keep both cannisters below the 400cc level reshma. END GOAL: a pouchable wound/fistula; we are at this point if she were discharged today, a wound manager could be placed to manage the effluent.   However, while she is here, we will continue NPWT to reduce the wound as much as possible until discharge. Will need to remove the Stormy Lyme drain prior to discharge. Discussed above plan with patient & RN. Seen today with Kelly Ellis, [address] [place] [address]  Transition of Care: Plan to follow as needed while admitted to hospital with [facility] change on THursday.      Rylee Cazares, YOLANDAN, RN, [facility] [place]  Certified Wound, Ostomy, Continence Nurse  office [phone]  pager [phone] or call  to page

## 2017-08-14 NOTE — PROGRESS NOTES
ID Progress Note  2017    Subjective:     Problems with the wound vac over the weekend    Objective:     Antibiotics:  1. None     Vitals:   Visit Vitals    /66 (BP 1 Location: Left arm, BP Patient Position: Sitting)  Comment (BP 1 Location): forearm    Pulse 97    Temp 98.7 °F (37.1 °C)    Resp 16    Ht 5' 4\" (1.626 m)    Wt 157.1 kg (346 lb 5.5 oz)    SpO2 100%    Breastfeeding No    BMI 59.45 kg/m2        Tmax:  Temp (24hrs), Av.9 °F (37.2 °C), Min:98.4 °F (36.9 °C), Max:99.7 °F (37.6 °C)      Exam:  Lungs:  clear to auscultation bilaterally  Heart:  regularly irregular rhythm  Abdomen:  abnormal findings:  tenderness moderate in the entire abdomen, hypoactive bowel sounds    Labs:      Recent Labs      17   0308  17   0411  17   0426   WBC  8.3  8.1  9.2   HGB  7.0*  7.9*  7.7*   PLT  394  434*  416*   BUN  15  16  20   CREA  0.64  0.79  0.67       Cultures:     Lab Results   Component Value Date/Time    Specimen Description: URINE 2009 07:45 PM     Lab Results   Component Value Date/Time    Culture result: NO GROWTH ON SOLID MEDIA AT 4 DAYS 2017 12:17 PM    Culture result:  2017 12:17 PM     **METHICILLIN RESISTANT STAPHYLOCOCCUS AUREUS**  ISOLATED FROM THIO BROTH ONLY      Culture result: NO GROWTH 5 DAYS 2017 02:35 PM       Radiology:     Line/Insert Date:           Assessment:     1. Ischemic gut with frozen abdomen  2. Leukocytosis--back up after surgery--trending back down overall--down to normal  3. Cultures negative to date    Objective:     1. Monitor off antibiotics  2.  Stop nystatin      Regina Powell MD

## 2017-08-14 NOTE — PROGRESS NOTES
Pt felt her stomach is \"upset\" because the approximate 1-2 minutes it takes to change her PCA tubing and PCA syringe. Pt agreed to have Tylenol to help ease the upset stomach.

## 2017-08-14 NOTE — PROGRESS NOTES
Progress Note    Patient: Haja Kohli MRN: [id number]  SSN: [id number]    YOB: 1975  Age: 46 y.o. Sex: female      Admit Date: 2017    32 Days Post-Op      Subjective:     No acute surgical issues. Minimal leakage from wound vac over the weekend. Pain is under control.      Objective:     Visit Vitals    /59 (BP 1 Location: Left arm, BP Patient Position: At rest)    Pulse (!) 110    Temp 99 °F (37.2 °C)    Resp 18    Ht 5' 4\" (1.626 m)    Wt 346 lb 5.5 oz (157.1 kg)    SpO2 99%    Breastfeeding No    BMI 59.45 kg/m2       Temp (24hrs), Av.9 °F (37.2 °C), Min:98.3 °F (36.8 °C), Max:99.7 °F (37.6 °C)        Physical Exam:    Gen:  NAD  Pulm:  Unlabored  Abd:  S/ND/appropriate TTP  Wound:  Semi bilious drainage from fred drain and wound vac    Recent Results (from the past 24 hour(s))   GLUCOSE, POC    Collection Time: 17 10:36 AM   Result Value Ref Range    Glucose (POC) 122 (H) 65 - 100 mg/dL    Performed by [address] [place] [address] By[address] [facility], POC    Collection Time: 17  9:28 PM   Result Value Ref Range    Glucose (POC) 121 (H) 65 - 100 mg/dL    Performed by Jose Aranda    METABOLIC PANEL, BASIC    Collection Time: 17  3:08 AM   Result Value Ref Range    Sodium 140 136 - 145 mmol/L    Potassium 4.1 3.5 - 5.1 mmol/L    Chloride 107 97 - 108 mmol/L    CO2 26 21 - 32 mmol/L    Anion gap 7 5 - 15 mmol/L    Glucose 102 (H) 65 - 100 mg/dL    BUN 15 6 - 20 MG/DL    Creatinine 0.64 0.55 - 1.02 MG/DL    BUN/Creatinine ratio 23 (H) 12 - 20      GFR est AA >60 >60 ml/min/1.73m2    GFR est non-AA >60 >60 ml/min/1.73m2    Calcium 8.2 (L) 8.5 - 10.1 MG/DL   CBC WITH AUTOMATED DIFF    Collection Time: 17  3:08 AM   Result Value Ref Range    WBC 8.3 3.6 - 11.0 K/uL    RBC 2.45 (L) 3.80 - 5.20 M/uL    HGB 7.0 (L) 11.5 - 16.0 g/dL    HCT 22.5 (L) 35.0 - 47.0 %    MCV 91.8 80.0 - 99.0 FL    MCH 28.6 26.0 - 34.0 PG    MCHC 31.1 30.0 - 36.5 g/dL    RDW 16.4 (H) 11.5 - 14.5 %    PLATELET 907 431 - 233 K/uL    NEUTROPHILS 57 32 - 75 %    LYMPHOCYTES 31 12 - 49 %    MONOCYTES 10 5 - 13 %    EOSINOPHILS 2 0 - 7 %    BASOPHILS 0 0 - 1 %    ABS. NEUTROPHILS 4.7 1.8 - 8.0 K/UL    ABS. LYMPHOCYTES 2.6 0.8 - 3.5 K/UL    ABS. MONOCYTES 0.8 0.0 - 1.0 K/UL    ABS. EOSINOPHILS 0.2 0.0 - 0.4 K/UL    ABS. 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 The stomach and proximal small bowel loops are distended. There is a loop of  small bowel in the midabdomen which is mildly dilated and does not demonstrate  enhancement in the wall and there is suspicion of pneumatosis and this leads to  a loop of small bowel which demonstrates thickening of the wall and findings are  suspicious for ischemia. 2. There is extensive mesenteric edema and a small amount of ascites in the  pelvis. Superior mesenteric artery thrombosis [facility]) ICD-10-CM: K55.069  ICD-9-CM: 557.0  6/28/2017 Clinically Undetermined    Overview Signed 7/3/2017  5:09 PM by Noa Castillo MD     CT abd/pelvis 6/28/17:  3. There is nonocclusive thrombus in the SMA. Abdominal pain ICD-10-CM: R10.9  ICD-9-CM: 789.00  6/25/2017 Yes              Plan/Recommendations/Medical Decision Making:     - Continue TPN  - Pain control  - Continue wound care.   Wound vac change today  - Out of bed as tolerated  - Hopefully DC to rehab within next 2 weeks    Signed By: Lachelle Kenyon MD     August 14, 2017

## 2017-08-14 NOTE — PROGRESS NOTES
Problem: Mobility Impaired (Adult and Pediatric)  Goal: *Acute Goals and Plan of Care (Insert Text)    Physical Therapy Goals,   updated 8/14/2017    1. Pt will ambulate in hallways for 350 feet with mod Ind within 7 days  2. Patient will perform bed mobility with mod independence within 7 days. 3. Patient will ascend and descend 4 steps within 7 days. 4. Patient will perform sit to stand independently within 7 days. Physical Therapy Goals,   updated 8/4/2017    1. Pt will ambulate in hallways for 250 feet with mod Ind within 7 days  2. Patient will perform bed mobility with mod independence within 7 days. 3. Patient will ascend and descend 4 steps within 7 days. 4. Patient will perform sit to stand independently within 7 days. Physical Therapy Goals  Revised 7/28/2017  1. Patient will transfer from bed to chair and chair to bed with modified independence using the least restrictive device within 7 day(s). 2. Patient will perform sit to stand with modified independence within 7 day(s). 3. Patient will ambulate with modified independence for 250 feet with the least restrictive device within 7 day(s). 4. Patient will progress to ambulating in the hallway within 7 days. 5. Patient will ascend and descend 4 steps within 7 days. Physical Therapy Goals  Revised 7/20/2017  1. Patient will move from supine to sit and sit to supine , scoot up and down and roll side to side in bed with independence within 7 day(s). 2. Patient will transfer from bed to chair and chair to bed with supervision/set-up using the least restrictive device within 7 day(s). 3. Patient will perform sit to stand with supervision/set-up within 7 day(s). 4. Patient will ambulate with supervision/set-up for 200 feet with the least restrictive device within 7 day(s). 5. Patient will ascend/descend 7 stairs with one handrail(s) with supervision/set-up within 7 day(s). Physical Therapy Goals  7/12/2017  1.  Patient will move from supine to sit and sit to supine , scoot up and down and roll side to side in bed with independence within 7 day(s). 2. Patient will transfer from bed to chair and chair to bed with supervision/set-up using the least restrictive device within 7 day(s). 3. Patient will perform sit to stand with supervision/set-up within 7 day(s). 4. Patient will ambulate with supervision/set-up for 50 feet with the least restrictive device within 7 day(s). PHYSICAL THERAPY TREATMENT: WEEKLY REASSESSMENT  Patient: Valarie Hinkle (46 y.o. female)  Date: 8/14/2017  Diagnosis: N/V intractable post-opt pain  Abdominal pain  poor iv access  DEAD BOWEL  SMALL BOWEL OBSTRUCTION  central line placement  Abdominal pain <principal problem not specified>  Procedure(s) (LRB):  SPECIAL PROCEDURE OUTSIDE OF OR (N/A) 32 Days Post-Op  Precautions: Fall, Contact (wound vac + 1-2 additional drains)      ASSESSMENT:  Pt supine in bed, wounds just drained and RN cleared for mobility. Pt anxious today regarding being unhooked from suction. Agreeable to ambulating in the hallway today. Increased gait distance to 400 ft with supervision. Occasional use of IV pole for increased confidence. Pt ambulated towards cardiac unit before becoming worried about the drains and requesting to return closer to the unit. Walked around elevators twice with increased speed and no use of IV pole for stability. Returned to room and stood for 5 minutes rearranging lines and furniture. Pt making good progress and expected to discharge in another 1-2 weeks. Plan on rehab at discharge for wound management. Patient's progression toward goals since last assessment: Increased activity tolerance, gait distance improved, increased daily activtiy to the chair and walking with staff. PLAN:  Goals have been updated based on progression since last assessment. Patient continues to benefit from skilled intervention to address the above impairments.   Continue to follow the patient 5 times a week to address goals. Planned Interventions:  [X]              Bed Mobility Training             [X]       Neuromuscular Re-Education  [X]              Transfer Training                   [ ]       Orthotic/Prosthetic Training  [X]              Gait Training                         [ ]       Modalities  [X]              Therapeutic Exercises           [ ]       Edema Management/Control  [X]              Therapeutic Activities            [X]       Patient and Family Training/Education  [ ]              Other (comment):  Discharge Recommendations: Rehab  Further Equipment Recommendations for Discharge: TBD       SUBJECTIVE:   Patient stated Heidi Titus feels different like it's not working right. (new drain)      OBJECTIVE DATA SUMMARY:   Critical Behavior:  Neurologic State: Alert, Appropriate for age  Orientation Level: Oriented X4  Cognition: Appropriate decision making, Appropriate for age attention/concentration, Appropriate safety awareness, Follows commands, Recognition of people/places        Strength:                           Functional Mobility Training:  Bed Mobility:     Supine to Sit: Modified independent  Sit to Supine: Modified independent           Transfers:  Sit to Stand: Supervision  Stand to Sit: Supervision                             Balance:  Sitting: Intact  Standing: Intact; With support  Standing - Static: Good  Standing - Dynamic : Good  Ambulation/Gait Training:  Distance (ft): 400 Feet (ft)  Assistive Device: Gait belt; Other (comment) (IV pole initially)  Ambulation - Level of Assistance: Supervision        Gait Abnormalities: Decreased step clearance;Circumduction        Base of Support: Widened     Speed/Liz: Pace decreased (<100 feet/min); Shuffled; Slow  Step Length: Right shortened;Left shortened           Activity Tolerance:   Good  Please refer to the flowsheet for vital signs taken during this treatment.   After treatment:   [X]  Patient left in no apparent distress sitting up in chair  [ ]  Patient left in no apparent distress in bed  [X]  Call bell left within reach  [X]  Nursing notified  [ ]  Caregiver present  [ ]  Bed alarm activated      COMMUNICATION/COLLABORATION:   The patients plan of care was discussed with: Registered Nurse     Gwenetta Cabot, PT   Time Calculation: 23 mins

## 2017-08-14 NOTE — PROGRESS NOTES
Bedside shift change report given to Jenni Beaver RN (CCU Nurse) (oncoming nurse) by Louie Diaz (offgoing nurse). Report included the following information SBAR, Kardex, Procedure Summary, Intake/Output, MAR and Recent Results.

## 2017-08-15 LAB
GLUCOSE BLD STRIP.AUTO-MCNC: 103 MG/DL (ref 65–100)
GLUCOSE BLD STRIP.AUTO-MCNC: 118 MG/DL (ref 65–100)
SERVICE CMNT-IMP: ABNORMAL
SERVICE CMNT-IMP: ABNORMAL

## 2017-08-15 PROCEDURE — 3331090002 HH PPS REVENUE DEBIT

## 2017-08-15 PROCEDURE — 97116 GAIT TRAINING THERAPY: CPT

## 2017-08-15 PROCEDURE — 74011250636 HC RX REV CODE- 250/636: Performed by: PHYSICAL MEDICINE & REHABILITATION

## 2017-08-15 PROCEDURE — 3331090001 HH PPS REVENUE CREDIT

## 2017-08-15 PROCEDURE — 65270000029 HC RM PRIVATE

## 2017-08-15 PROCEDURE — 74011250636 HC RX REV CODE- 250/636: Performed by: NURSE PRACTITIONER

## 2017-08-15 PROCEDURE — 74011250637 HC RX REV CODE- 250/637: Performed by: SURGERY

## 2017-08-15 PROCEDURE — 74011250636 HC RX REV CODE- 250/636: Performed by: INTERNAL MEDICINE

## 2017-08-15 PROCEDURE — 74011250636 HC RX REV CODE- 250/636: Performed by: EMERGENCY MEDICINE

## 2017-08-15 PROCEDURE — 74011000258 HC RX REV CODE- 258: Performed by: NURSE PRACTITIONER

## 2017-08-15 PROCEDURE — 82962 GLUCOSE BLOOD TEST: CPT

## 2017-08-15 PROCEDURE — 74011000250 HC RX REV CODE- 250: Performed by: NURSE PRACTITIONER

## 2017-08-15 PROCEDURE — 74011636637 HC RX REV CODE- 636/637: Performed by: NURSE PRACTITIONER

## 2017-08-15 RX ADMIN — PANTOPRAZOLE SODIUM 40 MG: 40 TABLET, DELAYED RELEASE ORAL at 07:27

## 2017-08-15 RX ADMIN — Medication 0.76 MG: at 13:00

## 2017-08-15 RX ADMIN — ONDANSETRON 8 MG: 2 INJECTION INTRAMUSCULAR; INTRAVENOUS at 23:18

## 2017-08-15 RX ADMIN — HYDROMORPHONE HYDROCHLORIDE 1 MG: 1 INJECTION, SOLUTION INTRAMUSCULAR; INTRAVENOUS; SUBCUTANEOUS at 23:18

## 2017-08-15 RX ADMIN — Medication 10 ML: at 18:19

## 2017-08-15 RX ADMIN — Medication 10 ML: at 22:06

## 2017-08-15 RX ADMIN — Medication 10 ML: at 13:01

## 2017-08-15 RX ADMIN — HYDROMORPHONE HYDROCHLORIDE 1 MG: 1 INJECTION, SOLUTION INTRAMUSCULAR; INTRAVENOUS; SUBCUTANEOUS at 09:28

## 2017-08-15 RX ADMIN — Medication 0.76 MG: at 06:09

## 2017-08-15 RX ADMIN — HYDROMORPHONE HYDROCHLORIDE 1 MG: 1 INJECTION, SOLUTION INTRAMUSCULAR; INTRAVENOUS; SUBCUTANEOUS at 06:10

## 2017-08-15 RX ADMIN — Medication 10 ML: at 06:11

## 2017-08-15 RX ADMIN — HYDROMORPHONE HYDROCHLORIDE 1 MG: 1 INJECTION, SOLUTION INTRAMUSCULAR; INTRAVENOUS; SUBCUTANEOUS at 18:30

## 2017-08-15 RX ADMIN — POTASSIUM CHLORIDE: 2 INJECTION, SOLUTION, CONCENTRATE INTRAVENOUS at 18:18

## 2017-08-15 RX ADMIN — CLOPIDOGREL BISULFATE 75 MG: 75 TABLET ORAL at 09:28

## 2017-08-15 RX ADMIN — HYDROMORPHONE HYDROCHLORIDE 1 MG: 1 INJECTION, SOLUTION INTRAMUSCULAR; INTRAVENOUS; SUBCUTANEOUS at 14:37

## 2017-08-15 RX ADMIN — Medication 0.76 MG: at 22:06

## 2017-08-15 RX ADMIN — Medication: at 16:48

## 2017-08-15 RX ADMIN — Medication 10 ML: at 16:28

## 2017-08-15 RX ADMIN — HYDROMORPHONE HYDROCHLORIDE 1 MG: 1 INJECTION, SOLUTION INTRAMUSCULAR; INTRAVENOUS; SUBCUTANEOUS at 20:39

## 2017-08-15 RX ADMIN — POTASSIUM CHLORIDE: 2 INJECTION, SOLUTION, CONCENTRATE INTRAVENOUS at 19:58

## 2017-08-15 RX ADMIN — ASPIRIN 81 MG CHEWABLE TABLET 81 MG: 81 TABLET CHEWABLE at 09:28

## 2017-08-15 RX ADMIN — Medication 10 ML: at 14:39

## 2017-08-15 RX ADMIN — Medication: at 09:26

## 2017-08-15 RX ADMIN — Medication 10 ML: at 09:28

## 2017-08-15 NOTE — PROGRESS NOTES
Progress Note    Patient: Graham Buchanan MRN: [id number]  SSN: [id number]    YOB: 1975  Age: 46 y.o. Sex: female      Admit Date: 2017    33 Days Post-Op    Procedure:  Procedure(s):  SPECIAL PROCEDURE OUTSIDE OF OR    Subjective:     Patient with complaint of nausea and pain at wound vac site. Wound vac changed yesterday. No vomiting, no chest pain, no shortness of breath. Patient up out of bed with assistance. Reilly cath in use. Objective:     Visit Vitals    /56 (BP 1 Location: Left arm)    Pulse 100    Temp 98.1 °F (36.7 °C)    Resp 18    Ht 5' 4\" (1.626 m)    Wt 346 lb 12.5 oz (157.3 kg)    SpO2 100%    Breastfeeding No    BMI 59.53 kg/m2       Temp (24hrs), Av.5 °F (36.9 °C), Min:98.1 °F (36.7 °C), Max:98.7 °F (37.1 °C)      Physical Exam:    LUNG: clear to auscultation bilaterally, HEART: regular rate and rhythm, S1, S2 normal,ABDOMEN: soft, non-tender. Bowel sounds present. Ostomy with no output. Rabia drain with bilious output. Midline wound vac, no leakage. Presence of sero-sang drainage from wound vac. 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 The stomach and proximal small bowel loops are distended.  There is a loop of  small bowel in the midabdomen which is mildly dilated and does not demonstrate  enhancement in the wall and there is suspicion of pneumatosis and this leads to  a loop of small bowel which demonstrates thickening of the wall and findings are  suspicious for ischemia. 2. There is extensive mesenteric edema and a small amount of ascites in the  pelvis. Superior mesenteric artery thrombosis [facility]) ICD-10-CM: K55.069  ICD-9-CM: 557.0  6/28/2017 Clinically Undetermined    Overview Signed 7/3/2017  5:09 PM by Germán Coulter MD     CT abd/pelvis 6/28/17:  3. There is nonocclusive thrombus in the SMA. Abdominal pain ICD-10-CM: R10.9  ICD-9-CM: 789.00  6/25/2017 Yes              Plan/Recommendations/Medical Decision Making:     Continue present treatment   Anti-emetics prn  Renew TPN. Monitor output of drains  Continue current pain management. Encourage ambulation. Up out of bed. Signed By: Demetrius Sarabia NP     August 15, 2017       ADDENDUM:  Minh Nathan MD  Pt seen and examined. No acute surgical issues. Continue wound vac therapy. Will convert to pouching system next week. Possible discharge to rehab next week.

## 2017-08-15 NOTE — PROGRESS NOTES
Faculty or Preceptor Review of Student Work    8/15/2017  - Shift times - [phone] to [phone]    The student documentation of patient care for Brook Porras has been reviewed and approved. All medications have been administered under the direct supervision of the faculty or preceptor.     Matilde Gonzalez RN

## 2017-08-15 NOTE — PROGRESS NOTES
Problem: Mobility Impaired (Adult and Pediatric)  Goal: *Acute Goals and Plan of Care (Insert Text)    Physical Therapy Goals,   updated 8/14/2017    1. Pt will ambulate in hallways for 350 feet with mod Ind within 7 days  2. Patient will perform bed mobility with mod independence within 7 days. 3. Patient will ascend and descend 4 steps within 7 days. 4. Patient will perform sit to stand independently within 7 days. Physical Therapy Goals,   updated 8/4/2017    1. Pt will ambulate in hallways for 250 feet with mod Ind within 7 days  2. Patient will perform bed mobility with mod independence within 7 days. 3. Patient will ascend and descend 4 steps within 7 days. 4. Patient will perform sit to stand independently within 7 days. Physical Therapy Goals  Revised 7/28/2017  1. Patient will transfer from bed to chair and chair to bed with modified independence using the least restrictive device within 7 day(s). 2. Patient will perform sit to stand with modified independence within 7 day(s). 3. Patient will ambulate with modified independence for 250 feet with the least restrictive device within 7 day(s). 4. Patient will progress to ambulating in the hallway within 7 days. 5. Patient will ascend and descend 4 steps within 7 days. Physical Therapy Goals  Revised 7/20/2017  1. Patient will move from supine to sit and sit to supine , scoot up and down and roll side to side in bed with independence within 7 day(s). 2. Patient will transfer from bed to chair and chair to bed with supervision/set-up using the least restrictive device within 7 day(s). 3. Patient will perform sit to stand with supervision/set-up within 7 day(s). 4. Patient will ambulate with supervision/set-up for 200 feet with the least restrictive device within 7 day(s). 5. Patient will ascend/descend 7 stairs with one handrail(s) with supervision/set-up within 7 day(s). Physical Therapy Goals  7/12/2017  1.  Patient will move from supine to sit and sit to supine , scoot up and down and roll side to side in bed with independence within 7 day(s). 2. Patient will transfer from bed to chair and chair to bed with supervision/set-up using the least restrictive device within 7 day(s). 3. Patient will perform sit to stand with supervision/set-up within 7 day(s). 4. Patient will ambulate with supervision/set-up for 50 feet with the least restrictive device within 7 day(s). PHYSICAL THERAPY TREATMENT  Patient: Haja Kohli (46 y.o. female)  Date: 8/15/2017  Diagnosis: N/V intractable post-opt pain  Abdominal pain  poor iv access  DEAD BOWEL  SMALL BOWEL OBSTRUCTION  central line placement  Abdominal pain <principal problem not specified>  Procedure(s) (LRB):  SPECIAL PROCEDURE OUTSIDE OF OR (N/A) 33 Days Post-Op  Precautions: Fall, Contact (wound vac + 1-2 additional drains)      ASSESSMENT:  Pt received up to the chair agreeable to PT focused on the wound vac and its output. She declined to use the gait belt. She came to stand independently, directing management of her equipment/bags. She amb in the hallway 400 feet with me managing the iv pole. Gait was stable. As she returned to her room her nurse arrived and took over to assist with mobility as needed. Overall, pt did well despite reported pain at 8-9/10 using PCA. Progression toward goals:  [ ]    Improving appropriately and progressing toward goals  [X]    Improving slowly and progressing toward goals  [ ]    Not making progress toward goals and plan of care will be adjusted       PLAN:  Patient continues to benefit from skilled intervention to address the above impairments. Continue treatment per established plan of care. Discharge Recommendations:  Rehab  Further Equipment Recommendations for Discharge:  None likely       SUBJECTIVE:   Patient stated I guess I can walk, I am sore.       OBJECTIVE DATA SUMMARY:   Chart checked, pt cleared by nursing  Critical Behavior:  Neurologic State: Alert  Orientation Level: Oriented X4  Cognition: Appropriate decision making, Appropriate for age attention/concentration, Appropriate safety awareness, Follows commands, Recognition of people/places     Functional Mobility Training:  Bed Mobility:         not assessed           Transfers:  Sit to Stand: Independent  Stand to Sit: Independent                             Balance:  Sitting: Intact  Standing: Intact; Without support  Ambulation/Gait Training:  Distance (ft): 400 Feet (ft)  Assistive Device:  (pt declined gait belt)  Ambulation - Level of Assistance: Supervision        Gait Abnormalities: Decreased step clearance        Base of Support: Widened     Speed/Liz: Slow  Step Length: Left shortened;Right shortened                               Stairs:                       Neuro Re-Education:     Therapeutic Exercises:      Pain:  Pain Scale 1: Numeric (0 - 10)  Pain Intensity 1: 8-9, using PCA  Pain Location 1: Abdomen  Pain Orientation 1: Mid  Pain Description 1: Stabbing  Pain Intervention(s) 1: Medication (see MAR)  Activity Tolerance:   Pt in NAD  After treatment:   [X]    Patient left in no apparent distress amb into the room with nursing  [ ]    Patient left in no apparent distress in bed  [ ]    Call bell left within reach  [ ]    Nursing notified  [ ]    Caregiver present  [ ]    Bed alarm activated      COMMUNICATION/COLLABORATION:   The patients plan of care was discussed with: Registered Nurse     Josiah Romero   Time Calculation: 22 mins

## 2017-08-15 NOTE — PROGRESS NOTES
ID Progress Note  8/15/2017    Subjective:     Problems with the wound vac    Objective:     Antibiotics:  1. None     Vitals:   Visit Vitals    /80 (BP 1 Location: Left arm)    Pulse 100    Temp 98.4 °F (36.9 °C)    Resp 16    Ht 5' 4\" (1.626 m)    Wt 157.3 kg (346 lb 12.5 oz)    SpO2 98%    Breastfeeding No    BMI 59.53 kg/m2        Tmax:  Temp (24hrs), Av.4 °F (36.9 °C), Min:98.1 °F (36.7 °C), Max:98.6 °F (37 °C)      Exam:  Lungs:  clear to auscultation bilaterally  Heart:  regularly irregular rhythm  Abdomen:  abnormal findings:  tenderness moderate in the entire abdomen, hypoactive bowel sounds    Labs:      Recent Labs      17   0308  17   0411   WBC  8.3  8.1   HGB  7.0*  7.9*   PLT  394  434*   BUN  15  16   CREA  0.64  0.79       Cultures:     Lab Results   Component Value Date/Time    Specimen Description: URINE 2009 07:45 PM     Lab Results   Component Value Date/Time    Culture result: NO GROWTH ON SOLID MEDIA AT 4 DAYS 2017 12:17 PM    Culture result:  2017 12:17 PM     **METHICILLIN RESISTANT STAPHYLOCOCCUS AUREUS**  ISOLATED FROM THIO BROTH ONLY      Culture result: NO GROWTH 5 DAYS 2017 02:35 PM       Radiology:     Line/Insert Date:           Assessment:     1. Ischemic gut with frozen abdomen  2. Leukocytosis--back up after surgery--trending back down overall--down to normal  3. Cultures negative to date    Objective:     1.  Monitor off antibiotics      Chris Kraft MD

## 2017-08-15 NOTE — PROGRESS NOTES
Bedside shift change report given to Zhane Garcia (oncoming nurse) by Debborah Nyhan (offgoing nurse). Report included the following information SBAR, Kardex, OR Summary, Procedure Summary, Intake/Output, MAR and Recent Results.

## 2017-08-15 NOTE — PROGRESS NOTES
8/15/17 1035: KAITLYNN s/w pt's mother Barak Hebert. Discussed patient's care needs and discharge options. Patient requires wound care and TPN for undetermined length of time, TPN need and young age likely excludes her from consideration for most area SNF's. CM explained that patient does not meet therapy needs for acute rehab (mobilizing and ambulating quite well in PT sessions, 400 ft with supervision, too high-functioning for ARF). Barak Hebert primarily concerned about [facility]'s reputation under old management (when operating as [place]). CM explained that Barak Hebert must make final decision on her own, however per this writer's evaluation LTACH is the most appropriate LOC for patient. Explained that [facility] only [facility] that accepts Estérishi Lauder ([address] is [employer] only). Barak Hebert prefers for patient to remain in [facility] as Barak Hebert would be staying with patient nightly/on weekends. Barak Hebert consented to [facility] sending referral to [facility] for review. Referral sent via [facility]. CM also updated Chayo's contact information in [address] [place] [address] (mobile [phone]). Discharge pending medical clearance and placement. CM to follow. TALITA Jaquez      ------------------------  8/15/17 3943  CM following for discharge planning. Noted previous discussions in re: SNF v. LTACH for wound care and nutrition needs (TPN). CM visited patient room, patient states that her mother Barak Hebert was researching facilities however uncertain if any decisions have been made. CM called Barak Hebert [phone], no answer. Patient provided Chayo's work phone [phone], CM called this number. No answer, voicemail left requesting Barak Hebert call this writer or [facility] RN station to provide facility preference. CM explained importance of identifying facilities as soon as possible to determine which (if any) can accept patient. CM to follow.  TALITA Jaquez

## 2017-08-15 NOTE — PROGRESS NOTES
Problem: Surgical Pathway Post-Op Day 2 through Discharge  Goal: Off Pathway (Use only if patient is Off Pathway)  Outcome: Progressing Towards Goal  Variance: Off Pathway (add note)  Pt. Condition has remained the same without any changes. Wound vac to abdomen is clean dry & intact and working appropriately. Patient malacott drain is the gravity and fred drain is to suction. Pt is off pathway surgically but progressing. Goal: *Optimal pain control at patients stated goal  Outcome: Progressing Towards Goal  Variance: Patient Condition  Pt. On PCA and needs occasional breakthrough Dilaudid   Goal: *Tolerating diet  Outcome: Progressing Towards Goal  Variance: Other (add note)  Pt. Is on TPN. Problem: Infection - Risk of, Central Venous Catheter-Associated Bloodstream Infection  Goal: *Absence of infection signs and symptoms  Outcome: Progressing Towards Goal  Pt. Is afebrile and is not exhibiting signs/symptoms of infection. Pt bathed daily with chlorahexadine wipes to keep area clean and free of infection     Problem: Surgical Wound Care  Goal: *Non-infected Wound: Absence of infection signs and symptoms  Infection control procedures (eg: clean dressings, clean gloves, hand washing, precautions to isolate wound from contamination, sterile instruments used for wound debridement) should be implemented. Outcome: Progressing Towards Goal  Pt. Wound is clean dry and intact   Goal: *Improvement of existing wound and maintenance of skin integrity  Outcome: Progressing Towards Goal  Pt. Wound vac is healing slowly    Problem: Pain  Goal: *Control of Pain  Outcome: Progressing Towards Goal  Pt.  Is on PCA pump and uses it appropriately

## 2017-08-15 NOTE — ROUTINE PROCESS
Bedside and Verbal shift change report given to Yvonne Maynard (oncoming nurse) by Raoul Ingram (offgoing nurse). Report included the following information SBAR, Kardex, Intake/Output, MAR and Cardiac Rhythm NSR.

## 2017-08-16 LAB
ANION GAP BLD CALC-SCNC: 6 MMOL/L (ref 5–15)
BASOPHILS # BLD: 0 K/UL (ref 0–0.1)
BASOPHILS NFR BLD: 0 % (ref 0–1)
BUN SERPL-MCNC: 22 MG/DL (ref 6–20)
BUN/CREAT SERPL: 35 (ref 12–20)
CALCIUM SERPL-MCNC: 9 MG/DL (ref 8.5–10.1)
CHLORIDE SERPL-SCNC: 106 MMOL/L (ref 97–108)
CO2 SERPL-SCNC: 26 MMOL/L (ref 21–32)
CREAT SERPL-MCNC: 0.62 MG/DL (ref 0.55–1.02)
DIFFERENTIAL METHOD BLD: ABNORMAL
EOSINOPHIL # BLD: 0.2 K/UL (ref 0–0.4)
EOSINOPHIL NFR BLD: 3 % (ref 0–7)
ERYTHROCYTE [DISTWIDTH] IN BLOOD BY AUTOMATED COUNT: 16.1 % (ref 11.5–14.5)
GLUCOSE BLD STRIP.AUTO-MCNC: 122 MG/DL (ref 65–100)
GLUCOSE BLD STRIP.AUTO-MCNC: 88 MG/DL (ref 65–100)
GLUCOSE SERPL-MCNC: 98 MG/DL (ref 65–100)
HCT VFR BLD AUTO: 26.5 % (ref 35–47)
HGB BLD-MCNC: 8.1 G/DL (ref 11.5–16)
LYMPHOCYTES # BLD: 3.3 K/UL (ref 0.8–3.5)
LYMPHOCYTES NFR BLD: 40 % (ref 12–49)
MAGNESIUM SERPL-MCNC: 1.9 MG/DL (ref 1.6–2.4)
MCH RBC QN AUTO: 28.2 PG (ref 26–34)
MCHC RBC AUTO-ENTMCNC: 30.6 G/DL (ref 30–36.5)
MCV RBC AUTO: 92.3 FL (ref 80–99)
MONOCYTES # BLD: 0.7 K/UL (ref 0–1)
MONOCYTES NFR BLD: 8 % (ref 5–13)
NEUTS SEG # BLD: 4 K/UL (ref 1.8–8)
NEUTS SEG NFR BLD: 49 % (ref 32–75)
PHOSPHATE SERPL-MCNC: 4.5 MG/DL (ref 2.6–4.7)
PLATELET # BLD AUTO: 440 K/UL (ref 150–400)
PLATELET COMMENTS,PCOM: ABNORMAL
POTASSIUM SERPL-SCNC: 4.5 MMOL/L (ref 3.5–5.1)
RBC # BLD AUTO: 2.87 M/UL (ref 3.8–5.2)
RBC MORPH BLD: ABNORMAL
SERVICE CMNT-IMP: ABNORMAL
SERVICE CMNT-IMP: NORMAL
SODIUM SERPL-SCNC: 138 MMOL/L (ref 136–145)
WBC # BLD AUTO: 8.2 K/UL (ref 3.6–11)

## 2017-08-16 PROCEDURE — 83735 ASSAY OF MAGNESIUM: CPT | Performed by: SURGERY

## 2017-08-16 PROCEDURE — 74011250636 HC RX REV CODE- 250/636: Performed by: PHYSICAL MEDICINE & REHABILITATION

## 2017-08-16 PROCEDURE — 74011250636 HC RX REV CODE- 250/636: Performed by: EMERGENCY MEDICINE

## 2017-08-16 PROCEDURE — 65270000029 HC RM PRIVATE

## 2017-08-16 PROCEDURE — 74011000250 HC RX REV CODE- 250: Performed by: NURSE PRACTITIONER

## 2017-08-16 PROCEDURE — 85025 COMPLETE CBC W/AUTO DIFF WBC: CPT | Performed by: SURGERY

## 2017-08-16 PROCEDURE — 74011250636 HC RX REV CODE- 250/636: Performed by: INTERNAL MEDICINE

## 2017-08-16 PROCEDURE — 82962 GLUCOSE BLOOD TEST: CPT

## 2017-08-16 PROCEDURE — 74011000250 HC RX REV CODE- 250: Performed by: SURGERY

## 2017-08-16 PROCEDURE — 74011250637 HC RX REV CODE- 250/637: Performed by: SURGERY

## 2017-08-16 PROCEDURE — 3331090002 HH PPS REVENUE DEBIT

## 2017-08-16 PROCEDURE — 3331090001 HH PPS REVENUE CREDIT

## 2017-08-16 PROCEDURE — 84100 ASSAY OF PHOSPHORUS: CPT | Performed by: SURGERY

## 2017-08-16 PROCEDURE — 74011000258 HC RX REV CODE- 258: Performed by: SURGERY

## 2017-08-16 PROCEDURE — 74011636637 HC RX REV CODE- 636/637: Performed by: SURGERY

## 2017-08-16 PROCEDURE — 74011250636 HC RX REV CODE- 250/636: Performed by: SURGERY

## 2017-08-16 PROCEDURE — 80048 BASIC METABOLIC PNL TOTAL CA: CPT | Performed by: SURGERY

## 2017-08-16 PROCEDURE — 36415 COLL VENOUS BLD VENIPUNCTURE: CPT | Performed by: SURGERY

## 2017-08-16 RX ADMIN — Medication 0.76 MG: at 14:23

## 2017-08-16 RX ADMIN — CLOPIDOGREL BISULFATE 75 MG: 75 TABLET ORAL at 09:29

## 2017-08-16 RX ADMIN — Medication 10 ML: at 14:00

## 2017-08-16 RX ADMIN — Medication 0.76 MG: at 22:40

## 2017-08-16 RX ADMIN — Medication 10 ML: at 22:41

## 2017-08-16 RX ADMIN — POTASSIUM CHLORIDE: 2 INJECTION, SOLUTION, CONCENTRATE INTRAVENOUS at 18:13

## 2017-08-16 RX ADMIN — HYDROMORPHONE HYDROCHLORIDE 1 MG: 1 INJECTION, SOLUTION INTRAMUSCULAR; INTRAVENOUS; SUBCUTANEOUS at 19:20

## 2017-08-16 RX ADMIN — Medication: at 03:00

## 2017-08-16 RX ADMIN — ASPIRIN 81 MG CHEWABLE TABLET 81 MG: 81 TABLET CHEWABLE at 09:29

## 2017-08-16 RX ADMIN — Medication 10 ML: at 06:02

## 2017-08-16 RX ADMIN — HYDROMORPHONE HYDROCHLORIDE 1 MG: 1 INJECTION, SOLUTION INTRAMUSCULAR; INTRAVENOUS; SUBCUTANEOUS at 09:31

## 2017-08-16 RX ADMIN — HYDROMORPHONE HYDROCHLORIDE 1 MG: 1 INJECTION, SOLUTION INTRAMUSCULAR; INTRAVENOUS; SUBCUTANEOUS at 07:00

## 2017-08-16 RX ADMIN — Medication: at 13:14

## 2017-08-16 RX ADMIN — PANTOPRAZOLE SODIUM 40 MG: 40 TABLET, DELAYED RELEASE ORAL at 06:53

## 2017-08-16 RX ADMIN — HYDROMORPHONE HYDROCHLORIDE 1 MG: 1 INJECTION, SOLUTION INTRAMUSCULAR; INTRAVENOUS; SUBCUTANEOUS at 13:14

## 2017-08-16 RX ADMIN — Medication 10 ML: at 16:08

## 2017-08-16 RX ADMIN — Medication 0.76 MG: at 06:01

## 2017-08-16 RX ADMIN — Medication: at 20:29

## 2017-08-16 RX ADMIN — I.V. FAT EMULSION 500 ML: 20 EMULSION INTRAVENOUS at 19:11

## 2017-08-16 NOTE — ROUTINE PROCESS
Bedside shift change report given to Gordy Carver RN (oncoming nurse) by Jemma Steiner RN (offgoing nurse). Report included the following information SBAR, ED Summary, Procedure Summary, Intake/Output, Recent Results, Med Rec Status and Cardiac Rhythm NSR/ sinus tachycardia.

## 2017-08-16 NOTE — CDMP QUERY
Patient is noted to have a BMI of 58.88. Please clarify if this patient is:     =>Morbidly obese (BMI ³ 40)  =>Obese (BMI 30 - 39.9)  =>Overweight (BMI 25 - 29.9)  =>Other explanation of clinical findings  =>Unable to determine (no explanation for clinical findings)    Presentation: 5'4\" , 343 lbs = BMI 58.88    REFERENCE:  The [address] [place] [address] has issued a statement indicating that, \"Individuals who are overweight, obese, or morbidly obese are at an increased risk for certain medical conditions when compared to persons of normal weight. Therefore, these conditions are always clinically significant and reportable when documented by the provider. Please clarify and document your clinical opinion in the progress notes and discharge summary, including the definitive and or presumptive diagnosis, (suspected or probable), related to the above clinical findings. Please include clinical findings supporting your diagnosis.      Thank Jono Renner [facility]  [phone]

## 2017-08-16 NOTE — PROGRESS NOTES
Problem: Falls - Risk of  Goal: *Absence of falls  Outcome: Progressing Towards Goal  Call bell is with in reach, side rails are up x 3, bed wheels are locked and bed is in its lowest position. Hourly rounds performed for patient safety. Goal: *Knowledge of fall prevention  Outcome: Progressing Towards Goal  Call bell is with in reach, side rails are up x 3, bed wheels are locked and bed is in its lowest position. Hourly rounds performed for patient safety. Problem: Patient Education: Go to Patient Education Activity  Goal: Patient/Family Education  Outcome: Progressing Towards Goal  Call bell is with in reach, side rails are up x 3, bed wheels are locked and bed is in its lowest position. Hourly rounds performed for patient safety. Problem: Pressure Injury - Risk of  Goal: *Prevention of pressure ulcer  Outcome: Progressing Towards Goal  Will continue to monitor skin integrity during shift and encourage patient to turn every two hours.

## 2017-08-16 NOTE — PROGRESS NOTES
Physical Therapy  8.16.17    Chart reviewed, attempted to see patient for PT session. Despite encouragement, patient declined OOB mobility at this time, citing a restless night due to pain. Reported she is currently in pain and has plan to transfer OOB to chair with nursing student within the next hour. Asked PT to return after lunch for mobility. Will f/u later as able/appropriate for PT session. Encouraged LE exercises while patient is seated in chair. Thank you.     Anthony Corona, PT, DPT

## 2017-08-16 NOTE — PROGRESS NOTES
Music Therapy Assessment    Johana Hobbs [id number]  [id number]    1975  46 y.o.  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 08/15/2011    Incarcerated ventral hernia 08/15/2011        Date: 8/16/2017       Mental Status:   [x] Alert [  ] Marvine Daljit [  ]  Confused  [  ] Minimally responsive    Communication Status: [  ] Impaired Speech [  ] Nonverbal     Physical Status:   [  ] Oxygen in use  [  ] Hard of Hearing [  ] Vision Impaired  [  ] Ambulatory  [  ] Ambulatory with assistance [  ] Non-ambulatory -N/A    Music Preferences, Background: Classical, [address], Pop, Jazz, especially Jo[address] [facility] and [place]; The patient sang in the school choir, and took guitar and cello lessons. Today, the pt's nurse said she'd observed the pt to enjoy songs from movies, e.g. The Bare Necessities from [employer]. Clinical Problem to be addressed: Support healthy coping. Goal(s) met in session:  Physical/Pain management (Scale of 1-10):    Pre-session rating: N/A: Please see Session Observations below. Post-session rating: N/A: Please see Session Observations below.   [  ] Increased relaxation   [  ] Regulated breathing patterns  [  ] Decreased muscle tension   [  ] Minimized physical distress     Emotional/Psychological:  [  ] Increased self-expression   [  ] Decreased aggressive behavior   [  ] Decreased sadness   [  ] Discussed healthy coping skills     [  ] Improved mood    [  ] Decreased withdrawn behavior     Social:  [  ] Decreased feelings of isolation/loneliness [x] Positive social interaction   [  ] Provided support and/or comfort for family/friends    Spiritual:  [  ] Spiritual support    [  ] Expressed peace  [  ] Expressed jalen    [  ] Discussed beliefs    Techniques Utilized (Check all that apply): N/A: Please see Session Observations below. [  ] Procedural support MT [  ] Music for relaxation [  ] Patient preferred music  [  ] Suzie analysis  [  ] Song choice  [  ] Music for validation  [  ] Entrainment  [  ] Progressive muscle relax. [  ] Guided visualization  [  ] Lexy Ee  [  ] Patient instrument playing [  ] Breann Lockhart writing  [  ] Collette Quaker along   [  ] [place] S[place]  [  ] Sensory stimulation  [  ] Active Listening  [  ] Music for spiritual support [  ] Making of CDs as gifts    Session Observations:  F/u visit; The lights were out and the patient's room was dark. This music therapist (MT) knocked but didn't hear a response. The patient (pt) opened her eyes when the MT entered and spoke her name. She declined having a music therapy session in the moment, saying she wanted to nap. She requested a follow up visit for later in the day. Will follow as able.     DEEDEE EmmanuelBC (Music Therapist-Board Certified)  Spiritual Care Department  Referral-based service

## 2017-08-16 NOTE — PROGRESS NOTES
Bedside shift change report given to Dallin Romero (oncoming nurse) by Js Tolentino (offgoing nurse). Report included the following information SBAR, Kardex, ED Summary, OR Summary, Procedure Summary, Intake/Output, MAR, Recent Results and Cardiac Rhythm NSR.

## 2017-08-16 NOTE — PROGRESS NOTES
Progress Note    Patient: Christina Cassidy MRN: [id number]  SSN: [id number]    YOB: 1975  Age: 46 y.o. Sex: female      Admit Date: 2017    34 Days Post-Op      Subjective:     No acute surgical issues. Pt doing okay. Pain still present and requiring IV pain medication.   Wound vac appears to have good seal.     Objective:     Visit Vitals    /60 (BP 1 Location: Left arm, BP Patient Position: At rest)    Pulse 98    Temp 98.5 °F (36.9 °C)    Resp 18    Ht 5' 4\" (1.626 m)    Wt 346 lb 9 oz (157.2 kg)    SpO2 99%    Breastfeeding No    BMI 59.49 kg/m2       Temp (24hrs), Av.5 °F (36.9 °C), Min:98.3 °F (36.8 °C), Max:99 °F (37.2 °C)        Physical Exam:    Gen:  NAD  Pulm:  Unlabored  Abd:  S/ND/appropriate TTP  Wound:  Semi bilious drainage from fred drain and wound vac    Recent Results (from the past 24 hour(s))   GLUCOSE, POC    Collection Time: 08/15/17 11:14 AM   Result Value Ref Range    Glucose (POC) 103 (H) 65 - 100 mg/dL    Performed by DSTLD    GLUCOSE, POC    Collection Time: 08/15/17  9:23 PM   Result Value Ref Range    Glucose (POC) 118 (H) 65 - 100 mg/dL    Performed by Melani Cap    METABOLIC PANEL, BASIC    Collection Time: 17  3:05 AM   Result Value Ref Range    Sodium 138 136 - 145 mmol/L    Potassium 4.5 3.5 - 5.1 mmol/L    Chloride 106 97 - 108 mmol/L    CO2 26 21 - 32 mmol/L    Anion gap 6 5 - 15 mmol/L    Glucose 98 65 - 100 mg/dL    BUN 22 (H) 6 - 20 MG/DL    Creatinine 0.62 0.55 - 1.02 MG/DL    BUN/Creatinine ratio 35 (H) 12 - 20      GFR est AA >60 >60 ml/min/1.73m2    GFR est non-AA >60 >60 ml/min/1.73m2    Calcium 9.0 8.5 - 10.1 MG/DL   CBC WITH AUTOMATED DIFF    Collection Time: 08/16/17  3:05 AM   Result Value Ref Range    WBC 8.2 3.6 - 11.0 K/uL    RBC 2.87 (L) 3.80 - 5.20 M/uL    HGB 8.1 (L) 11.5 - 16.0 g/dL    HCT 26.5 (L) 35.0 - 47.0 %    MCV 92.3 80.0 - 99.0 FL    MCH 28.2 26.0 - 34.0 PG    MCHC 30.6 30.0 - 36.5 g/dL    RDW 16.1 (H) 11.5 - 14.5 %    PLATELET 977 (H) 115 - 400 K/uL    NEUTROPHILS 49 32 - 75 %    LYMPHOCYTES 40 12 - 49 %    MONOCYTES 8 5 - 13 %    EOSINOPHILS 3 0 - 7 %    BASOPHILS 0 0 - 1 %    ABS. NEUTROPHILS 4.0 1.8 - 8.0 K/UL    ABS. LYMPHOCYTES 3.3 0.8 - 3.5 K/UL    ABS. MONOCYTES 0.7 0.0 - 1.0 K/UL    ABS. EOSINOPHILS 0.2 0.0 - 0.4 K/UL    ABS. BASOPHILS 0.0 0.0 - 0.1 K/UL    DF MANUAL      PLATELET COMMENTS LARGE PLATELETS      RBC COMMENTS ANISOCYTOSIS  1+        RBC COMMENTS POLYCHROMASIA  1+        RBC COMMENTS OVALOCYTES  PRESENT       MAGNESIUM    Collection Time: 08/16/17  3:05 AM   Result Value Ref Range    Magnesium 1.9 1.6 - 2.4 mg/dL   PHOSPHORUS    Collection Time: 08/16/17  3:05 AM   Result Value Ref Range    Phosphorus 4.5 2.6 - 4.7 MG/DL           Assessment:     Hospital Problems  Date Reviewed: 6/28/2017          Codes Class Noted POA    Enterocutaneous fistula ICD-10-CM: K63.2  ICD-9-CM: 569.81  6/30/2017 No        Small bowel ischemia (HCC) ICD-10-CM: K55.9  ICD-9-CM: 557.9  6/28/2017 Clinically Undetermined    Overview Signed 7/3/2017  5:08 PM by Lisa Matos MD     CT abd/pelvis 6/28/17  1. The stomach and proximal small bowel loops are distended. There is a loop of  small bowel in the midabdomen which is mildly dilated and does not demonstrate  enhancement in the wall and there is suspicion of pneumatosis and this leads to  a loop of small bowel which demonstrates thickening of the wall and findings are  suspicious for ischemia. 2. There is extensive mesenteric edema and a small amount of ascites in the  pelvis. Superior mesenteric artery thrombosis [facility]) ICD-10-CM: K55.069  ICD-9-CM: 557.0  6/28/2017 Clinically Undetermined    Overview Signed 7/3/2017  5:09 PM by Lisa Matos MD     CT abd/pelvis 6/28/17:  3. There is nonocclusive thrombus in the SMA.                Abdominal pain ICD-10-CM: R10.9  ICD-9-CM: 789.00  6/25/2017 Yes Plan/Recommendations/Medical Decision Making:     - Continue TPN  - Pain control  - Continue wound care.     - Out of bed as tolerated  - Hopefully DC to rehab within next week - Possibly [facility]    Signed By: Lesly Catherine MD     August 16, 2017

## 2017-08-16 NOTE — PROGRESS NOTES
Faculty or Preceptor Review of Student Work    8/16/2017  - Shift times - [phone] to [phone]    The student documentation of patient care for Graham Buchanan has been reviewed and approved. All medications have been administered under the direct supervision of the faculty or preceptor.     Jamshid Altamirano, RN

## 2017-08-16 NOTE — PROGRESS NOTES
ID Progress Note  2017    Subjective:     Problems with the wound vac    Objective:     Antibiotics:  1. None     Vitals:   Visit Vitals    BP (!) 85/53 (BP 1 Location: Left arm, BP Patient Position: At rest)    Pulse 100    Temp 98.4 °F (36.9 °C)    Resp 17    Ht 5' 4\" (1.626 m)    Wt 157.2 kg (346 lb 9 oz)    SpO2 100%    Breastfeeding No    BMI 59.49 kg/m2        Tmax:  Temp (24hrs), Av.6 °F (37 °C), Min:98.3 °F (36.8 °C), Max:99 °F (37.2 °C)      Exam:  Lungs:  clear to auscultation bilaterally  Heart:  regularly irregular rhythm  Abdomen:  abnormal findings:  tenderness moderate in the entire abdomen, hypoactive bowel sounds    Labs:      Recent Labs      17   0305  17   0308   WBC  8.2  8.3   HGB  8.1*  7.0*   PLT  440*  394   BUN  22*  15   CREA  0.62  0.64       Cultures:     Lab Results   Component Value Date/Time    Specimen Description: URINE 2009 07:45 PM     Lab Results   Component Value Date/Time    Culture result: NO GROWTH ON SOLID MEDIA AT 4 DAYS 2017 12:17 PM    Culture result:  2017 12:17 PM     **METHICILLIN RESISTANT STAPHYLOCOCCUS AUREUS**  ISOLATED FROM THIO BROTH ONLY      Culture result: NO GROWTH 5 DAYS 2017 02:35 PM       Radiology:     Line/Insert Date:           Assessment:     1. Ischemic gut with frozen abdomen  2. Leukocytosis--back up after surgery--trending back down overall--down to normal  3. Cultures negative to date    Objective:     1.  Monitor off antibiotics      Elmer Antonio MD

## 2017-08-16 NOTE — ROUTINE PROCESS
Bedside shift change report given to Yadira Hackett RN (oncoming nurse) by Acosta Blum RN (offgoing nurse). Report included the following information SBAR, ED Summary, OR Summary, Procedure Summary, Intake/Output, Recent Results, Med Rec Status and Cardiac Rhythm NSR/ sinus tachycardia .

## 2017-08-16 NOTE — PROGRESS NOTES
NUTRITION COMPLETE ASSESSMENT    RECOMMENDATIONS:   Standing scale weight at least 3 x/week     Interventions/Plan:   Food/Nutrient Delivery: Parenteral nutrition support      Assessment:   Reason for Assessment:    [x]Reassessment     TPN: 5%AA D20 @ 85 ml/hr x 1 hour                                              @ 172 mL/hr x 13 hours                                              @ 85 mL/hr x last hour                        + MVI, thiamine (100 mg), trace elements (3x/week), zinc sulfate 10 mg (3x/week)                        + 20% lipids, 500 mL 3x/week      Diet: NPO  Nutritionally Significant Medications: [x] Reviewed & Includes: magnesium sulfate, LR @ 25 ml/hr, correction scale insulin    Subjective:  Pt sleepy; room is dark. Agreed that she is doing better-moving in the right direction. Objective:  Chart reviewed for follow-up; discussed with RN. Noted possible transfer to [facility] soon; remains on TPN as sole source of nutrition d/t EC fistula. Patient happy with reduction in BG checks. Good BG control noted. Weights being taken with bedscale which are inaccurate (typically 10 kg difference). Discussed with RN-night shift normally takes weights @ 3 am. Will be difficult to obtain standing weight at this time; suggest weighing later in the morning. TPN meets pt's estimated needs by providin kcal, 120 gm protein and 2406 ml. LR provides an additional 600 ml fluid per day. Estimated Nutrition Needs:   Kcals/day: 6398 Kcals/day (1572-0717 kcal/day [facility]. Jeor x 1.2-1.3))  Protein: 110 g (110-136 g/day (2-2.5 g/kg IBW))  Fluid:  (1  ml/kcal)  Based On: [place]  Jeor  Weight Used: Actual wt (150.4 kg (standing scale weight 17))    Pt expected to meet estimated nutrient needs:  [x]   Yes via TPN    []  No  [] Unable to predict at this time      Nutrition Diagnosis:   1.  Inadequate oral food/beverage intake related to unresectable ischemic bowel  as evidenced by NPO with TPN as sole source of nutrition    Goals:     TPN to meet at least 90% of estimated needs over the next 5-7 days     Monitoring & Evaluation:    - Enteral/parenteral nutrition intake   - Electrolyte and renal profile, Weight/weight change, GI profile    Previous Nutrition Goals Met:  Yes  Previous Recommendations:      Yes    Education & Discharge Needs:   [x] None Identified   [] Identified and addressed    [x] Participated in care plan, discharge planning, and/or interdisciplinary rounds        Cultural, Spiritism and ethnic food preferences identified:   None    Skin Integrity: []Intact  [x] surgical incision  Edema: []None [x] 2+ generalized, 1+ NP BUE's  Last BM: 8/14 via ostomy  Food Allergies: [x]None []Other    Anthropometrics:    Weight Loss Metrics 8/16/2017 6/25/2017 6/25/2017 6/25/2017 6/22/2017 6/12/2017 6/6/2017   Today's Wt 346 lb 9 oz - 343 lb - 343 lb 343 lb 14.7 oz -   BMI - 59.49 kg/m2 - 58.88 kg/m2 58.88 kg/m2 - 59.03 kg/m2      Last 3 Recorded Weights in this Encounter    08/15/17 0000 08/16/17 0300 08/16/17 1002   Weight: 157.3 kg (346 lb 12.5 oz) 157.2 kg (346 lb 9 oz) 157.2 kg (346 lb 9 oz)      Weight Source: Bed  Height: 5' 4\" (162.6 cm),    Body mass index is 59.49 kg/(m^2).   IBW : 54.4 kg (120 lb), % IBW (Calculated): 288.8 %   ,      Labs:    Lab Results   Component Value Date/Time    Sodium 138 08/16/2017 03:05 AM    Potassium 4.5 08/16/2017 03:05 AM    Chloride 106 08/16/2017 03:05 AM    CO2 26 08/16/2017 03:05 AM    Glucose 98 08/16/2017 03:05 AM    BUN 22 08/16/2017 03:05 AM    Creatinine 0.62 08/16/2017 03:05 AM    Calcium 9.0 08/16/2017 03:05 AM    Magnesium 1.9 08/16/2017 03:05 AM    Phosphorus 4.5 08/16/2017 03:05 AM    Albumin 1.5 07/08/2017 04:42 AM     No results found for: HBA1C, HGBE8, AZI7JBCG, OKU4QAJP  Lab Results   Component Value Date/Time    Glucose (POC) 118 08/15/2017 09:23 PM      Lab Results   Component Value Date/Time    ALT (SGPT) 13 07/08/2017 04:42 AM    AST (SGOT) 11 07/08/2017 04:42 AM    Alk.  phosphatase 120 07/08/2017 04:42 AM    Bilirubin, direct 0.1 07/07/2009 06:38 PM    Bilirubin, total 0.6 07/08/2017 04:42 AM        Regis Sabillon, RD CNSC

## 2017-08-16 NOTE — PROGRESS NOTES
Problem: Falls - Risk of  Goal: *Absence of falls  Outcome: Progressing Towards Goal  Pt has had no falls during admission  Goal: *Knowledge of fall prevention  Outcome: Progressing Towards Goal  Pt uses the call bell appropriately     Problem: Surgical Pathway Post-Op Day 2 through Discharge  Goal: Off Pathway (Use only if patient is Off Pathway)  Outcome: Progressing Towards Goal  Patient is slowly responding to treatment and wound vac is working well.  Patient off pathway but progressing well   Goal: *Optimal pain control at patients stated goal  Outcome: Progressing Towards Goal  Pt is on a PCA and getting breakthrough pain medication   Goal: *Adequate urinary output (equal to or greater than 30 milliliters/hour)  Outcome: Progressing Towards Goal  Pt is voiding well on her own      Goal: *Hemodynamically stable  Outcome: Progressing Towards Goal  Hgb has increased   Goal: *Tolerating diet  Outcome: Progressing Towards Goal  Pt is on TPN and tolerating sips of clears

## 2017-08-16 NOTE — ROUTINE PROCESS
Attempt to call report [phone] but no answer. Called bed board and instructed to wait until after change of shift to give report since 3N was getting a couple of patients right now.

## 2017-08-17 LAB
GLUCOSE BLD STRIP.AUTO-MCNC: 144 MG/DL (ref 65–100)
GLUCOSE BLD STRIP.AUTO-MCNC: 99 MG/DL (ref 65–100)
SERVICE CMNT-IMP: ABNORMAL
SERVICE CMNT-IMP: NORMAL

## 2017-08-17 PROCEDURE — 74011250636 HC RX REV CODE- 250/636: Performed by: SURGERY

## 2017-08-17 PROCEDURE — 3331090002 HH PPS REVENUE DEBIT

## 2017-08-17 PROCEDURE — 74011250636 HC RX REV CODE- 250/636: Performed by: INTERNAL MEDICINE

## 2017-08-17 PROCEDURE — 74011000258 HC RX REV CODE- 258: Performed by: SURGERY

## 2017-08-17 PROCEDURE — 74011250637 HC RX REV CODE- 250/637: Performed by: SURGERY

## 2017-08-17 PROCEDURE — 74011250636 HC RX REV CODE- 250/636: Performed by: EMERGENCY MEDICINE

## 2017-08-17 PROCEDURE — 65210000002 HC RM PRIVATE GYN

## 2017-08-17 PROCEDURE — 97530 THERAPEUTIC ACTIVITIES: CPT

## 2017-08-17 PROCEDURE — 74011000250 HC RX REV CODE- 250: Performed by: INTERNAL MEDICINE

## 2017-08-17 PROCEDURE — 74011250636 HC RX REV CODE- 250/636: Performed by: PHYSICAL MEDICINE & REHABILITATION

## 2017-08-17 PROCEDURE — 3331090001 HH PPS REVENUE CREDIT

## 2017-08-17 PROCEDURE — 82962 GLUCOSE BLOOD TEST: CPT

## 2017-08-17 PROCEDURE — 74011250636 HC RX REV CODE- 250/636: Performed by: NURSE PRACTITIONER

## 2017-08-17 PROCEDURE — 74011636637 HC RX REV CODE- 636/637: Performed by: SURGERY

## 2017-08-17 PROCEDURE — 74011000250 HC RX REV CODE- 250: Performed by: SURGERY

## 2017-08-17 RX ORDER — MORPHINE SULFATE 15 MG/1
30 TABLET, FILM COATED, EXTENDED RELEASE ORAL EVERY 12 HOURS
Status: DISCONTINUED | OUTPATIENT
Start: 2017-08-17 | End: 2017-08-21

## 2017-08-17 RX ADMIN — Medication: at 15:18

## 2017-08-17 RX ADMIN — Medication 0.76 MG: at 21:03

## 2017-08-17 RX ADMIN — Medication 10 ML: at 06:38

## 2017-08-17 RX ADMIN — SODIUM CHLORIDE, SODIUM LACTATE, POTASSIUM CHLORIDE, AND CALCIUM CHLORIDE 25 ML/HR: 600; 310; 30; 20 INJECTION, SOLUTION INTRAVENOUS at 10:25

## 2017-08-17 RX ADMIN — HYDROMORPHONE HYDROCHLORIDE 1 MG: 1 INJECTION, SOLUTION INTRAMUSCULAR; INTRAVENOUS; SUBCUTANEOUS at 19:50

## 2017-08-17 RX ADMIN — Medication 0.76 MG: at 06:37

## 2017-08-17 RX ADMIN — PANTOPRAZOLE SODIUM 40 MG: 40 TABLET, DELAYED RELEASE ORAL at 06:38

## 2017-08-17 RX ADMIN — HYDROMORPHONE HYDROCHLORIDE 1 MG: 1 INJECTION, SOLUTION INTRAMUSCULAR; INTRAVENOUS; SUBCUTANEOUS at 16:53

## 2017-08-17 RX ADMIN — Medication 10 ML: at 21:10

## 2017-08-17 RX ADMIN — POTASSIUM CHLORIDE: 2 INJECTION, SOLUTION, CONCENTRATE INTRAVENOUS at 18:51

## 2017-08-17 RX ADMIN — SODIUM CHLORIDE 5 MG: 9 INJECTION INTRAMUSCULAR; INTRAVENOUS; SUBCUTANEOUS at 06:49

## 2017-08-17 RX ADMIN — ONDANSETRON 8 MG: 2 INJECTION INTRAMUSCULAR; INTRAVENOUS at 02:07

## 2017-08-17 RX ADMIN — MORPHINE SULFATE 30 MG: 15 TABLET, FILM COATED, EXTENDED RELEASE ORAL at 12:01

## 2017-08-17 RX ADMIN — HYDROMORPHONE HYDROCHLORIDE 1 MG: 1 INJECTION, SOLUTION INTRAMUSCULAR; INTRAVENOUS; SUBCUTANEOUS at 02:11

## 2017-08-17 RX ADMIN — Medication 0.76 MG: at 14:41

## 2017-08-17 RX ADMIN — HYDROMORPHONE HYDROCHLORIDE 1 MG: 1 INJECTION, SOLUTION INTRAMUSCULAR; INTRAVENOUS; SUBCUTANEOUS at 10:36

## 2017-08-17 RX ADMIN — CLOPIDOGREL BISULFATE 75 MG: 75 TABLET ORAL at 10:23

## 2017-08-17 RX ADMIN — Medication: at 06:06

## 2017-08-17 RX ADMIN — MORPHINE SULFATE 30 MG: 15 TABLET, FILM COATED, EXTENDED RELEASE ORAL at 21:02

## 2017-08-17 RX ADMIN — ASPIRIN 81 MG CHEWABLE TABLET 81 MG: 81 TABLET CHEWABLE at 10:23

## 2017-08-17 RX ADMIN — HYDROMORPHONE HYDROCHLORIDE 1 MG: 1 INJECTION, SOLUTION INTRAMUSCULAR; INTRAVENOUS; SUBCUTANEOUS at 06:49

## 2017-08-17 NOTE — PROGRESS NOTES
CM following for discharge planning, note transfer from [facility] to . Sent [employer] updates to [facility], requested status of referral. Per attending/general surgery note, expect discharge next week. CM s/w Marichuy Bowers [phone], Kamran Bowers confirms that patient accepted to [facility] pending confirmation of Medicare days. Kamran Bowers to complete review today in anticipation of transfer beginning of next week. CM will continue to follow.  ROBERT Morales/TYLER

## 2017-08-17 NOTE — PROGRESS NOTES
TRANSFER - IN REPORT:    Verbal report received from Derrick eMza [id number] (name) on Johana Hobbs  being received from [facility] (unit) for routine progression of care      Report consisted of patients Situation, Background, Assessment and   Recommendations(SBAR). Information from the following report(s) SBAR, Kardex, Procedure Summary, Intake/Output, Recent Results and Cardiac Rhythm Sinsus Tach was reviewed with the receiving nurse. Opportunity for questions and clarification was provided. Assessment completed upon patients arrival to unit and care assumed.

## 2017-08-17 NOTE — PROGRESS NOTES
Bedside shift change report given to Kaley Mccormick RN (oncoming nurse) by Armon Carrel, RN (offgoing nurse). Report included the following information SBAR, Kardex, Procedure Summary, Intake/Output and Recent Results.

## 2017-08-17 NOTE — WOUND CARE
WOCN Note:     Follow-up visit for NPWT dressing change. Chart shows:  Admitted for nausea, vomiting, and abdominal pain; history of Crohn's, DVT, bowel perforation with surgery 6/7/17. Exploratory lap, KATHRINE, fistula exclusion and tube placement by Dr. Yfn Julien 7/7/17.      Assessment:   Patient is A&O x4 and mobile around room.    Bed: total care bariatric sport  Patient using PureWick.    Diet: NPO with sips & TPN  Pre-medicated for pain by RN.     Seen today with Dr. Yfn Julien. Mucus fistula LLQ: red & moist and almost at skin level; some mucosal transplantation noted; output is light green mucus. Good seal with Activelife pouch - no change today.      LUQ red rubber catheter to bedside bag. Bilious output in bedside bag.  The insertion of this catheter is seen on the left margin of wound bed into proximal lumen of small intestine. Effluent leaks around cath insertion. Secured with tape.     Insertion site of [place] drain (now removed) in RUQ = 0.8 x 0.8 x 0.4 cm.  100% moist red. Moist plug of packing placed.        Rabia drain in RLQ with it's end resting in wound bed.      1. Midline abdominal dehisced incision = 11 x 6 x 3 cm.  Ana. Base is 70% moist red mucosal tissue with peristalsis & 30% moist pink with some mucus. Pouched with large Hawk's wound manager with paste and Hawk's rings in dc on left and right.   Red rubber catheter secured with tape - unable to use cath secure today as it overlapped the pouch. Rabia drain connected to wall suction.       Recommendations:    Maintain pouch or change as needed with leaking. May walk around off of wall suction and then reconnect. Discussed above plan with patient & RN, [facility].     Transition of Care: Plan to follow as needed while admitted to hospital.    YOLANDA FinneyN, RN, [facility]  Certified Wound, Ostomy, Continence Nurse  office [phone]  pager [phone] or call  to page

## 2017-08-17 NOTE — PROGRESS NOTES
22:45  TRANSFER - OUT REPORT:    Verbal report given to Torres (name) on Nitin Astorga  being transferred to  (unit) for routine progression of care       Report consisted of patients Situation, Background, Assessment and   Recommendations(SBAR). Information from the following report(s) SBAR, Procedure Summary, Intake/Output, MAR, Recent Results and Cardiac Rhythm NSR was reviewed with the receiving nurse. Lines:   PICC Double Lumen 08/02/17 Right (Active)   Central Line Being Utilized Yes 8/16/2017  8:29 PM   Criteria for Appropriate Use Total parenteral nutrition 8/16/2017  8:29 PM   Site Assessment Clean, dry, & intact 8/16/2017  8:29 PM   Phlebitis Assessment 0 8/16/2017  8:29 PM   Infiltration Assessment 0 8/16/2017  8:29 PM   Date of Last Dressing Change 08/13/17 8/16/2017  8:29 PM   Dressing Status Clean, dry, & intact 8/16/2017  8:29 PM   Action Taken Open ports on tubing capped 8/16/2017  8:29 PM   External Catheter Length (cm) 0 centimeters 8/2/2017  4:03 PM   Dressing Type Disk with Chlorhexadine gluconate (CHG); Transparent 8/16/2017  8:29 PM   Hub Color/Line Status Red;Capped 8/16/2017  8:29 PM   Positive Blood Return (Site #1) Yes 8/16/2017  8:29 PM   Hub Color/Line Status Purple; Infusing 8/16/2017  8:29 PM   Positive Blood Return (Site #2) Yes 8/16/2017  8:29 PM   Alcohol Cap Used Yes 8/16/2017  8:29 PM        Opportunity for questions and clarification was provided.       Patient transported with:   Tech              23:15  Pt leaving floor with transport services

## 2017-08-17 NOTE — PROGRESS NOTES
Progress Note    Patient: Yessenia Antonio MRN: [id number]  SSN: [id number]    YOB: 1975  Age: 46 y.o. Sex: female      Admit Date: 2017    35 Days Post-Op      Subjective:     No acute surgical issues. Pt reported more pain today. Wound vac appears to have good seal.  Pt is a bit tachycardic most likely from pain     Objective:     Visit Vitals    BP 99/63 (BP 1 Location: Left arm, BP Patient Position: At rest)    Pulse (!) 106    Temp 98.5 °F (36.9 °C)    Resp 18    Ht 5' 4\" (1.626 m)    Wt 337 lb 15.4 oz (153.3 kg)    SpO2 98%    Breastfeeding No    BMI 58.01 kg/m2       Temp (24hrs), Av.6 °F (37 °C), Min:98.4 °F (36.9 °C), Max:98.8 °F (37.1 °C)        Physical Exam:    Gen:  NAD  Pulm:  Unlabored  Abd:  S/ND/appropriate TTP  Wound:  Semi bilious drainage from fred drain and wound vac    Recent Results (from the past 24 hour(s))   GLUCOSE, POC    Collection Time: 17 12:16 PM   Result Value Ref Range    Glucose (POC) 88 65 - 100 mg/dL    Performed by Radha 214, POC    Collection Time: 17  9:20 PM   Result Value Ref Range    Glucose (POC) 122 (H) 65 - 100 mg/dL    Performed by Carlyle Rehman            Assessment:     Hospital Problems  Date Reviewed: 2017          Codes Class Noted POA    Enterocutaneous fistula ICD-10-CM: K63.2  ICD-9-CM: 569.81  2017 No        Small bowel ischemia (HCC) ICD-10-CM: K55.9  ICD-9-CM: 557.9  2017 Clinically Undetermined    Overview Signed 7/3/2017  5:08 PM by Arron Baird MD     CT abd/pelvis 17  1. The stomach and proximal small bowel loops are distended. There is a loop of  small bowel in the midabdomen which is mildly dilated and does not demonstrate  enhancement in the wall and there is suspicion of pneumatosis and this leads to  a loop of small bowel which demonstrates thickening of the wall and findings are  suspicious for ischemia.   2. There is extensive mesenteric edema and a small amount of ascites in the  pelvis. Superior mesenteric artery thrombosis [facility]) ICD-10-CM: K55.069  ICD-9-CM: 557.0  6/28/2017 Clinically Undetermined    Overview Signed 7/3/2017  5:09 PM by Johanna Bird MD     CT abd/pelvis 6/28/17:  3. There is nonocclusive thrombus in the SMA. Abdominal pain ICD-10-CM: R10.9  ICD-9-CM: 789.00  6/25/2017 Yes              Plan/Recommendations/Medical Decision Making:     - Continue TPN  - Pain control  - Continue wound care.     - Out of bed as tolerated  - Advance MS-Contin  - Hopefully DC to rehab within next week - Possibly [facility]    Signed By: Harry Schmitt MD     August 17, 2017

## 2017-08-17 NOTE — PROGRESS NOTES
Music Therapy Assessment    Patricia You [id number]  [id number]    1975  46 y.o.  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 08/15/2011    DVT (deep venous thrombosis) ([facility] 08/15/2011    Incarcerated ventral hernia 08/15/2011        Date: 2017       Mental Status:   [x] Alert [  ] Durel Coronel [  ]  Confused  [  ] Minimally responsive    Communication Status: [  ] Impaired Speech [  ] Nonverbal     Physical Status:   [  ] Oxygen in use  [  ] Hard of Hearing [  ] Vision Impaired  [  ] Ambulatory  [  ] Ambulatory with assistance [  ] Non-ambulatory -N/A    Music Preferences, Background: Classical, [address], Pop, Jazz, especially [address] [facility] and [place]; The patient sang in the school choir, and took guitar and cello lessons. Today, the pt's nurse said she'd observed the pt to enjoy songs from movies, e.g. The Bare Necessities from [employer]. Clinical Problem addressed: Decrease feelings of stress, improve mood, positive social interaction, support healthy coping.     Goal(s) met in session:  Physical/Pain management (Scale of 1-10):    Pre-session ratin    Post-session ratin   [  ] Increased relaxation   [  ] Regulated breathing patterns  [  ] Decreased muscle tension   [  ] Minimized physical distress     Emotional/Psychological:  [x] Increased self-expression   [  ] Decreased aggressive behavior   [  ] Decreased sadness   [  ] Discussed healthy coping skills     [  ] Improved mood    [  ] Decreased withdrawn behavior     Social:  [  ] Decreased feelings of isolation/loneliness [x] Positive social interaction   [  ] Provided support and/or comfort for family/friends    Spiritual:  [  ] Spiritual support    [  ] Expressed peace  [  ] Expressed jalen    [  ] Discussed beliefs    Techniques Utilized (Check all that apply):   [  ] Procedural support MT [  ] Music for relaxation [x] Patient preferred music  [  ] Suzie analysis  [x] Song choice  [  ] Music for validation  [  ] Entrainment  [  ] Progressive muscle relax. [  ] Guided visualization  [  ] [place] Moment  [  ] Patient instrument playing [  ] George Salter writing  [  ] Yvette Homans along   [  ] Fam Shape  [  ] Sensory stimulation  [x] Active Listening  [  ] Music for spiritual support [  ] Making of CDs as gifts    Session Observations:  F/u visit; The patient (pt) was observed to have a sad affect sitting in a chair. She requested to hear songs from the musical The Sound of Music. This music therapist (MT) provided the pt with a suzie sheet (a piece of paper with song lyrics) to the song My Favorite Things so the pt could sing along. The MT sang this song a capella (vocals only without instrumental accompaniment) since the MT can't bring accompanying instruments like guitar into contact precaution rooms. The pt's affect brightened in response to the music, as evidenced by (AEB) her eyes brightening, and expressing enjoyment in the song. She increased positive social interaction in response to follow up questions the MT asked about some of the pt's favorite things AEB sharing some of these. The MT provided active listening and words of support. The pt increased emotional expression as she shared, AEB becoming tearful when she spoke about her pet, a dog, because she misses him.  The MT expressed sympathy for this, and then redirected the pt, asking what song she'd like next. The pt requested Do-Re-Mi and the MT sang this. The pt expressed enjoyment in the music, and then said she'd like to nap now. She said she didn't sleep well last night and if feeling tired this afternoon. The pt denied any further needs. She expressed gratitude for the session. Will follow as able.     DEEDEE LiBC (Music Therapist-Board Certified)  Spiritual Care Department  Referral-based service

## 2017-08-17 NOTE — PROGRESS NOTES
Problem: Mobility Impaired (Adult and Pediatric)  Goal: *Acute Goals and Plan of Care (Insert Text)    Physical Therapy Goals,   updated 8/14/2017    1. Pt will ambulate in hallways for 350 feet with mod Ind within 7 days  2. Patient will perform bed mobility with mod independence within 7 days. 3. Patient will ascend and descend 4 steps within 7 days. 4. Patient will perform sit to stand independently within 7 days. Physical Therapy Goals,   updated 8/4/2017    1. Pt will ambulate in hallways for 250 feet with mod Ind within 7 days  2. Patient will perform bed mobility with mod independence within 7 days. 3. Patient will ascend and descend 4 steps within 7 days. 4. Patient will perform sit to stand independently within 7 days. Physical Therapy Goals  Revised 7/28/2017  1. Patient will transfer from bed to chair and chair to bed with modified independence using the least restrictive device within 7 day(s). 2. Patient will perform sit to stand with modified independence within 7 day(s). 3. Patient will ambulate with modified independence for 250 feet with the least restrictive device within 7 day(s). 4. Patient will progress to ambulating in the hallway within 7 days. 5. Patient will ascend and descend 4 steps within 7 days. Physical Therapy Goals  Revised 7/20/2017  1. Patient will move from supine to sit and sit to supine , scoot up and down and roll side to side in bed with independence within 7 day(s). 2. Patient will transfer from bed to chair and chair to bed with supervision/set-up using the least restrictive device within 7 day(s). 3. Patient will perform sit to stand with supervision/set-up within 7 day(s). 4. Patient will ambulate with supervision/set-up for 200 feet with the least restrictive device within 7 day(s). 5. Patient will ascend/descend 7 stairs with one handrail(s) with supervision/set-up within 7 day(s). Physical Therapy Goals  7/12/2017  1.  Patient will move from supine to sit and sit to supine , scoot up and down and roll side to side in bed with independence within 7 day(s). 2. Patient will transfer from bed to chair and chair to bed with supervision/set-up using the least restrictive device within 7 day(s). 3. Patient will perform sit to stand with supervision/set-up within 7 day(s). 4. Patient will ambulate with supervision/set-up for 50 feet with the least restrictive device within 7 day(s). PHYSICAL THERAPY TREATMENT  Patient: Casimiro Bowman (46 y.o. female)  Date: 8/17/2017  Diagnosis: N/V intractable post-opt pain  Abdominal pain  poor iv access  DEAD BOWEL  SMALL BOWEL OBSTRUCTION  central line placement  Abdominal pain <principal problem not specified>  Procedure(s) (LRB):  SPECIAL PROCEDURE OUTSIDE OF OR (N/A) 35 Days Post-Op  Precautions: Fall, Contact (drain + wall suction)      ASSESSMENT:  Patient received supine in bed, asleep but agreeable to transfer to bariatric (stretcher) chair brought to 3rd floor from [facility]. Continues to mobilize well, but remains anxious about how PT was setting up room, size of chair, and management of lines/leads with mobility. Hesitant to allow PT to disconnect suction because \"that is the only one (she) has\" referring to her drain, therefore transferred to stretcher chair at bedside for session. Balance intact for all mobility. Recommend considering decreasing patient to 3x/week for therapy at next re-assessment, as patient moves at supervision level and could be mobilizing daily with nursing. Able to scoot self back into stretcher chair at end of session. Recommend d/c to LTAC as patient remains at very high risk for deterioration due to medical complexity and low motivation.    Progression toward goals:  [ ]    Improving appropriately and progressing toward goals  [X]    Improving slowly and progressing toward goals  [ ]    Not making progress toward goals and plan of care will be adjusted PLAN:  Patient continues to benefit from skilled intervention to address the above impairments. Continue treatment per established plan of care. Discharge Recommendations:  LTAC  Further Equipment Recommendations for Discharge:  TBD       SUBJECTIVE:   Patient stated [facility] are you unplugging everything? Ruth Alexander That chair looks like a [employer] chair. ..      OBJECTIVE DATA SUMMARY:   Critical Behavior:  Neurologic State: Alert  Orientation Level: Oriented X4  Cognition: Follows commands, Appropriate decision making, Appropriate for age attention/concentration  Functional Mobility Training:  Bed Mobility:  Supine to Sit: Supervision  Scooting: Supervision  Transfers:  Sit to Stand: Stand-by asssistance  Stand to Sit: Stand-by asssistance  Bed to Chair: Stand-by asssistance  Balance:  Sitting: Intact  Standing: Intact  Pain:  Pain Scale 1: Numeric (0 - 10)  Pain Intensity 1: 9  Pain Location 1: Abdomen  Pain Orientation 1: Anterior  Pain Description 1: Aching  Pain Intervention(s) 1: Medication (see MAR)  Activity Tolerance:   Good  Please refer to the flowsheet for vital signs taken during this treatment.   After treatment:   [X]    Patient left in no apparent distress sitting up in chair  [ ]    Patient left in no apparent distress in bed  [X]    Call bell left within reach  [X]    Nursing notified  [ ]    Caregiver present  [ ]    Bed alarm activated      COMMUNICATION/COLLABORATION:   The patients plan of care was discussed with: Lauryn Milian Post, PT, DPT   Time Calculation: 25 mins

## 2017-08-17 NOTE — PROGRESS NOTES
ID Progress Note  2017    Subjective:     Problems with the wound vac    Objective:     Antibiotics:  1. None     Vitals:   Visit Vitals    /66 (BP 1 Location: Left arm, BP Patient Position: At rest;Sitting)    Pulse (!) 106    Temp 98.3 °F (36.8 °C)    Resp 18    Ht 5' 4\" (1.626 m)    Wt 153.3 kg (337 lb 15.4 oz)    SpO2 95%    Breastfeeding No    BMI 58.01 kg/m2        Tmax:  Temp (24hrs), Av.5 °F (36.9 °C), Min:98.3 °F (36.8 °C), Max:98.6 °F (37 °C)      Exam:  Lungs:  clear to auscultation bilaterally  Heart:  regularly irregular rhythm  Abdomen:  abnormal findings:  tenderness moderate in the entire abdomen, hypoactive bowel sounds    Labs:      Recent Labs      17   0305   WBC  8.2   HGB  8.1*   PLT  440*   BUN  22*   CREA  0.62       Cultures:     Lab Results   Component Value Date/Time    Specimen Description: URINE 2009 07:45 PM     Lab Results   Component Value Date/Time    Culture result: NO GROWTH ON SOLID MEDIA AT 4 DAYS 2017 12:17 PM    Culture result:  2017 12:17 PM     **METHICILLIN RESISTANT STAPHYLOCOCCUS AUREUS**  ISOLATED FROM THIO BROTH ONLY      Culture result: NO GROWTH 5 DAYS 2017 02:35 PM       Radiology:     Line/Insert Date:           Assessment:     1. Ischemic gut with frozen abdomen  2. Leukocytosis--back up after surgery--trending back down overall--down to normal  3. Cultures negative to date    Objective:     1.  Monitor off antibiotics      Dane Davis MD

## 2017-08-18 LAB
ANION GAP SERPL CALC-SCNC: 5 MMOL/L (ref 5–15)
BASOPHILS # BLD: 0 K/UL (ref 0–0.1)
BASOPHILS NFR BLD: 0 % (ref 0–1)
BUN SERPL-MCNC: 20 MG/DL (ref 6–20)
BUN/CREAT SERPL: 33 (ref 12–20)
CALCIUM SERPL-MCNC: 8.9 MG/DL (ref 8.5–10.1)
CHLORIDE SERPL-SCNC: 107 MMOL/L (ref 97–108)
CO2 SERPL-SCNC: 26 MMOL/L (ref 21–32)
CREAT SERPL-MCNC: 0.6 MG/DL (ref 0.55–1.02)
DIFFERENTIAL METHOD BLD: ABNORMAL
EOSINOPHIL # BLD: 0.4 K/UL (ref 0–0.4)
EOSINOPHIL NFR BLD: 5 % (ref 0–7)
ERYTHROCYTE [DISTWIDTH] IN BLOOD BY AUTOMATED COUNT: 15.6 % (ref 11.5–14.5)
GLUCOSE BLD STRIP.AUTO-MCNC: 135 MG/DL (ref 65–100)
GLUCOSE BLD STRIP.AUTO-MCNC: 94 MG/DL (ref 65–100)
GLUCOSE SERPL-MCNC: 109 MG/DL (ref 65–100)
HCT VFR BLD AUTO: 24.5 % (ref 35–47)
HGB BLD-MCNC: 7.7 G/DL (ref 11.5–16)
LYMPHOCYTES # BLD: 2.7 K/UL (ref 0.8–3.5)
LYMPHOCYTES NFR BLD: 37 % (ref 12–49)
MAGNESIUM SERPL-MCNC: 1.9 MG/DL (ref 1.6–2.4)
MCH RBC QN AUTO: 28.1 PG (ref 26–34)
MCHC RBC AUTO-ENTMCNC: 31.4 G/DL (ref 30–36.5)
MCV RBC AUTO: 89.4 FL (ref 80–99)
MONOCYTES # BLD: 0.6 K/UL (ref 0–1)
MONOCYTES NFR BLD: 9 % (ref 5–13)
NEUTS SEG # BLD: 3.5 K/UL (ref 1.8–8)
NEUTS SEG NFR BLD: 49 % (ref 32–75)
PHOSPHATE SERPL-MCNC: 4.2 MG/DL (ref 2.6–4.7)
PLATELET # BLD AUTO: 426 K/UL (ref 150–400)
POTASSIUM SERPL-SCNC: 4.4 MMOL/L (ref 3.5–5.1)
RBC # BLD AUTO: 2.74 M/UL (ref 3.8–5.2)
RBC MORPH BLD: ABNORMAL
RBC MORPH BLD: ABNORMAL
SERVICE CMNT-IMP: ABNORMAL
SERVICE CMNT-IMP: NORMAL
SODIUM SERPL-SCNC: 138 MMOL/L (ref 136–145)
WBC # BLD AUTO: 7.2 K/UL (ref 3.6–11)

## 2017-08-18 PROCEDURE — 74011250637 HC RX REV CODE- 250/637: Performed by: SURGERY

## 2017-08-18 PROCEDURE — 82962 GLUCOSE BLOOD TEST: CPT

## 2017-08-18 PROCEDURE — 36415 COLL VENOUS BLD VENIPUNCTURE: CPT | Performed by: SURGERY

## 2017-08-18 PROCEDURE — 85025 COMPLETE CBC W/AUTO DIFF WBC: CPT | Performed by: SURGERY

## 2017-08-18 PROCEDURE — 83735 ASSAY OF MAGNESIUM: CPT | Performed by: SURGERY

## 2017-08-18 PROCEDURE — 74011000258 HC RX REV CODE- 258: Performed by: SURGERY

## 2017-08-18 PROCEDURE — 84100 ASSAY OF PHOSPHORUS: CPT | Performed by: SURGERY

## 2017-08-18 PROCEDURE — 74011000250 HC RX REV CODE- 250: Performed by: SURGERY

## 2017-08-18 PROCEDURE — 3331090002 HH PPS REVENUE DEBIT

## 2017-08-18 PROCEDURE — 74011250636 HC RX REV CODE- 250/636: Performed by: NURSE PRACTITIONER

## 2017-08-18 PROCEDURE — 65210000002 HC RM PRIVATE GYN

## 2017-08-18 PROCEDURE — 74011250636 HC RX REV CODE- 250/636: Performed by: EMERGENCY MEDICINE

## 2017-08-18 PROCEDURE — 80048 BASIC METABOLIC PNL TOTAL CA: CPT | Performed by: SURGERY

## 2017-08-18 PROCEDURE — 74011250636 HC RX REV CODE- 250/636: Performed by: PHYSICAL MEDICINE & REHABILITATION

## 2017-08-18 PROCEDURE — 74011000250 HC RX REV CODE- 250: Performed by: NURSE PRACTITIONER

## 2017-08-18 PROCEDURE — 74011250636 HC RX REV CODE- 250/636: Performed by: INTERNAL MEDICINE

## 2017-08-18 PROCEDURE — 3331090001 HH PPS REVENUE CREDIT

## 2017-08-18 PROCEDURE — 74011250636 HC RX REV CODE- 250/636: Performed by: SURGERY

## 2017-08-18 PROCEDURE — 74011636637 HC RX REV CODE- 636/637: Performed by: SURGERY

## 2017-08-18 RX ADMIN — CLOPIDOGREL BISULFATE 75 MG: 75 TABLET ORAL at 10:28

## 2017-08-18 RX ADMIN — Medication 0.76 MG: at 21:18

## 2017-08-18 RX ADMIN — MORPHINE SULFATE 30 MG: 15 TABLET, FILM COATED, EXTENDED RELEASE ORAL at 21:18

## 2017-08-18 RX ADMIN — Medication 10 ML: at 21:32

## 2017-08-18 RX ADMIN — I.V. FAT EMULSION 500 ML: 20 EMULSION INTRAVENOUS at 19:33

## 2017-08-18 RX ADMIN — Medication: at 10:31

## 2017-08-18 RX ADMIN — Medication 10 ML: at 21:18

## 2017-08-18 RX ADMIN — Medication 0.76 MG: at 06:49

## 2017-08-18 RX ADMIN — Medication 0.76 MG: at 14:50

## 2017-08-18 RX ADMIN — Medication: at 02:00

## 2017-08-18 RX ADMIN — HYDROMORPHONE HYDROCHLORIDE 1 MG: 1 INJECTION, SOLUTION INTRAMUSCULAR; INTRAVENOUS; SUBCUTANEOUS at 18:30

## 2017-08-18 RX ADMIN — MORPHINE SULFATE 30 MG: 15 TABLET, FILM COATED, EXTENDED RELEASE ORAL at 10:28

## 2017-08-18 RX ADMIN — ASPIRIN 81 MG CHEWABLE TABLET 81 MG: 81 TABLET CHEWABLE at 10:28

## 2017-08-18 RX ADMIN — POTASSIUM CHLORIDE: 2 INJECTION, SOLUTION, CONCENTRATE INTRAVENOUS at 19:33

## 2017-08-18 RX ADMIN — SODIUM CHLORIDE, SODIUM LACTATE, POTASSIUM CHLORIDE, AND CALCIUM CHLORIDE 25 ML/HR: 600; 310; 30; 20 INJECTION, SOLUTION INTRAVENOUS at 22:25

## 2017-08-18 RX ADMIN — Medication 10 ML: at 06:00

## 2017-08-18 RX ADMIN — PANTOPRAZOLE SODIUM 40 MG: 40 TABLET, DELAYED RELEASE ORAL at 06:49

## 2017-08-18 RX ADMIN — Medication: at 21:20

## 2017-08-18 RX ADMIN — ONDANSETRON 8 MG: 2 INJECTION INTRAMUSCULAR; INTRAVENOUS at 02:07

## 2017-08-18 NOTE — WOUND CARE
Checked on fistula pouch seal.  No leaks noted/good seal.  Rabia drain to wall suction appears to have clogged and pouch is now draining to bedside bag. Change pouch as needed with leaking. Supplies in room. Will continue to follow.   BRYAN FinneyN

## 2017-08-18 NOTE — PROGRESS NOTES
Progress Note    Patient: Era Zavala MRN: [id number]  SSN: [id number]    YOB: 1975  Age: 46 y.o. Sex: female      Admit Date: 2017    35 Days Post-Op      Subjective:     No acute surgical issues. Pt reported more pain today.   Wound vac appears to have good seal.  Pt is a bit tachycardic most likely from pain     Objective:     Visit Vitals    /64 (BP 1 Location: Left arm, BP Patient Position: At rest)    Pulse 100    Temp 97.8 °F (36.6 °C)    Resp 18    Ht 5' 4\" (1.626 m)    Wt 337 lb 8.4 oz (153.1 kg)    SpO2 98%    Breastfeeding No    BMI 57.94 kg/m2       Temp (24hrs), Av.1 °F (36.7 °C), Min:97.3 °F (36.3 °C), Max:98.5 °F (36.9 °C)        Physical Exam:    Gen:  NAD  Pulm:  Unlabored  Abd:  S/ND/appropriate TTP  Wound:  Semi bilious drainage from fred drain and wound appliance pouch    Recent Results (from the past 24 hour(s))   GLUCOSE, POC    Collection Time: 17 11:32 AM   Result Value Ref Range    Glucose (POC) 99 65 - 100 mg/dL    Performed by Paige Macias    GLUCOSE, POC    Collection Time: 17  9:23 PM   Result Value Ref Range    Glucose (POC) 144 (H) 65 - 100 mg/dL    Performed by Shawnee Mayes (PCT)    METABOLIC PANEL, BASIC    Collection Time: 17  5:52 AM   Result Value Ref Range    Sodium 138 136 - 145 mmol/L    Potassium 4.4 3.5 - 5.1 mmol/L    Chloride 107 97 - 108 mmol/L    CO2 26 21 - 32 mmol/L    Anion gap 5 5 - 15 mmol/L    Glucose 109 (H) 65 - 100 mg/dL    BUN 20 6 - 20 MG/DL    Creatinine 0.60 0.55 - 1.02 MG/DL    BUN/Creatinine ratio 33 (H) 12 - 20      GFR est AA >60 >60 ml/min/1.73m2    GFR est non-AA >60 >60 ml/min/1.73m2    Calcium 8.9 8.5 - 10.1 MG/DL   CBC WITH AUTOMATED DIFF    Collection Time: 17  5:52 AM   Result Value Ref Range    WBC 7.2 3.6 - 11.0 K/uL    RBC 2.74 (L) 3.80 - 5.20 M/uL    HGB 7.7 (L) 11.5 - 16.0 g/dL    HCT 24.5 (L) 35.0 - 47.0 %    MCV 89.4 80.0 - 99.0 FL    MCH 28.1 26.0 - 34.0 PG MCHC 31.4 30.0 - 36.5 g/dL    RDW 15.6 (H) 11.5 - 14.5 %    PLATELET 074 (H) 116 - 400 K/uL    NEUTROPHILS 49 32 - 75 %    LYMPHOCYTES 37 12 - 49 %    MONOCYTES 9 5 - 13 %    EOSINOPHILS 5 0 - 7 %    BASOPHILS 0 0 - 1 %    ABS. NEUTROPHILS 3.5 1.8 - 8.0 K/UL    ABS. LYMPHOCYTES 2.7 0.8 - 3.5 K/UL    ABS. MONOCYTES 0.6 0.0 - 1.0 K/UL    ABS. EOSINOPHILS 0.4 0.0 - 0.4 K/UL    ABS. 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 The stomach and proximal small bowel loops are distended. There is a loop of  small bowel in the midabdomen which is mildly dilated and does not demonstrate  enhancement in the wall and there is suspicion of pneumatosis and this leads to  a loop of small bowel which demonstrates thickening of the wall and findings are  suspicious for ischemia. 2. There is extensive mesenteric edema and a small amount of ascites in the  pelvis. Superior mesenteric artery thrombosis [facility]) ICD-10-CM: K55.069  ICD-9-CM: 557.0  6/28/2017 Clinically Undetermined    Overview Signed 7/3/2017  5:09 PM by Jose Luis Byrd MD     CT abd/pelvis 6/28/17:  3. There is nonocclusive thrombus in the SMA.                Abdominal pain ICD-10-CM: R10.9  ICD-9-CM: 789.00  6/25/2017 Yes              Plan/Recommendations/Medical Decision Making:     - Continue TPN  - Pain control with PCA and MS-Contin  - Continue wound care.     - Out of bed as tolerated  - Hopefully DC to rehab within next week - Possibly [facility]    Signed By: Gelacio Randolph MD     August 18, 2017

## 2017-08-18 NOTE — PROGRESS NOTES
ID Progress Note  2017    Subjective:     Problems with the wound vac    Objective:     Antibiotics:  1. None     Vitals:   Visit Vitals    /66 (BP 1 Location: Left arm, BP Patient Position: At rest)    Pulse 90    Temp 98.3 °F (36.8 °C)    Resp 18    Ht 5' 4\" (1.626 m)    Wt 153.1 kg (337 lb 8.4 oz)    SpO2 98%    Breastfeeding No    BMI 57.94 kg/m2        Tmax:  Temp (24hrs), Av °F (36.7 °C), Min:97.3 °F (36.3 °C), Max:98.4 °F (36.9 °C)      Exam:  Lungs:  clear to auscultation bilaterally  Heart:  regularly irregular rhythm  Abdomen:  abnormal findings:  tenderness moderate in the entire abdomen, hypoactive bowel sounds    Labs:      Recent Labs      17   0552  17   0305   WBC  7.2  8.2   HGB  7.7*  8.1*   PLT  426*  440*   BUN  20  22*   CREA  0.60  0.62       Cultures:     Lab Results   Component Value Date/Time    Specimen Description: URINE 2009 07:45 PM     Lab Results   Component Value Date/Time    Culture result: NO GROWTH ON SOLID MEDIA AT 4 DAYS 2017 12:17 PM    Culture result:  2017 12:17 PM     **METHICILLIN RESISTANT STAPHYLOCOCCUS AUREUS**  ISOLATED FROM THIO BROTH ONLY      Culture result: NO GROWTH 5 DAYS 2017 02:35 PM       Radiology:     Line/Insert Date:           Assessment:     1. Ischemic gut with frozen abdomen  2. Leukocytosis--back up after surgery--trending back down overall--down to normal  3. Cultures negative to date    Objective:     1. Monitor off antibiotics  2.  Group will see over the weekend if asked      Denise Perez MD

## 2017-08-18 NOTE — PROGRESS NOTES
Physical Therapy  8.18.17    Chart reviewed. In to see patient for PT session. Patient deferred OOB at this time, stating she was waiting on her mother to arrive to assist her with bathing, then she planned to get OOB with mother to walk and sit in chair. Patient is SBA and clear to ambulate with family. Will f/u Monday for reassessment. Encourage OOB to chair over the weekend and ambulating with SBA x1. Patient likely appropriate for decrease in frequency for therapy at re-assessment, as her skilled needs are becoming less (now requires more self motivation than direction, cueing, or physical assist). Thank you. **Note patient may be disconnected from suction for up to 30 minutes for ambulation. Layton Garcia, PT, DPT

## 2017-08-18 NOTE — PROGRESS NOTES
1429: received information of pt not stable for discharge today. Informed Dari Middleton who indicated no bed availability until at least Monday. Will continue to follow. William Kunz, [address] [place] [address]: Spoke with [facility] representative Alejandro Mcdonald @ [phone]) this am. Pt has been accepted for transfer to [facility] when medically stable. Await discharge order.   William Kunz, ZACH

## 2017-08-18 NOTE — PROGRESS NOTES
CM spoke with NP Isabel Arredondo this PM regarding disposition plan as pt has been accepted to [address] [place] [address]. Per Isabel Arredondo, will defer discharge at this time and reassess readiness on Monday. Physician plans to re-evaluate medications as pt is still on PCA. CM will continue to follow. Informed RN of the above.     Sangeetha John, MSW

## 2017-08-19 LAB
GLUCOSE BLD STRIP.AUTO-MCNC: 122 MG/DL (ref 65–100)
GLUCOSE BLD STRIP.AUTO-MCNC: 99 MG/DL (ref 65–100)
SERVICE CMNT-IMP: ABNORMAL
SERVICE CMNT-IMP: NORMAL

## 2017-08-19 PROCEDURE — 74011636637 HC RX REV CODE- 636/637: Performed by: SURGERY

## 2017-08-19 PROCEDURE — 82962 GLUCOSE BLOOD TEST: CPT

## 2017-08-19 PROCEDURE — 74011250636 HC RX REV CODE- 250/636: Performed by: SURGERY

## 2017-08-19 PROCEDURE — 74011000258 HC RX REV CODE- 258: Performed by: SURGERY

## 2017-08-19 PROCEDURE — 65210000002 HC RM PRIVATE GYN

## 2017-08-19 PROCEDURE — 74011250636 HC RX REV CODE- 250/636: Performed by: NURSE PRACTITIONER

## 2017-08-19 PROCEDURE — 3331090001 HH PPS REVENUE CREDIT

## 2017-08-19 PROCEDURE — 74011000250 HC RX REV CODE- 250: Performed by: SURGERY

## 2017-08-19 PROCEDURE — 74011250636 HC RX REV CODE- 250/636: Performed by: EMERGENCY MEDICINE

## 2017-08-19 PROCEDURE — 74011250637 HC RX REV CODE- 250/637: Performed by: SURGERY

## 2017-08-19 PROCEDURE — 74011250636 HC RX REV CODE- 250/636: Performed by: PHYSICAL MEDICINE & REHABILITATION

## 2017-08-19 PROCEDURE — 74011250636 HC RX REV CODE- 250/636: Performed by: INTERNAL MEDICINE

## 2017-08-19 PROCEDURE — 3331090002 HH PPS REVENUE DEBIT

## 2017-08-19 RX ADMIN — Medication 10 ML: at 07:14

## 2017-08-19 RX ADMIN — ASPIRIN 81 MG CHEWABLE TABLET 81 MG: 81 TABLET CHEWABLE at 09:15

## 2017-08-19 RX ADMIN — CLOPIDOGREL BISULFATE 75 MG: 75 TABLET ORAL at 09:16

## 2017-08-19 RX ADMIN — POTASSIUM CHLORIDE: 2 INJECTION, SOLUTION, CONCENTRATE INTRAVENOUS at 19:06

## 2017-08-19 RX ADMIN — HYDROMORPHONE HYDROCHLORIDE 1 MG: 1 INJECTION, SOLUTION INTRAMUSCULAR; INTRAVENOUS; SUBCUTANEOUS at 17:36

## 2017-08-19 RX ADMIN — HYDROMORPHONE HYDROCHLORIDE 1 MG: 1 INJECTION, SOLUTION INTRAMUSCULAR; INTRAVENOUS; SUBCUTANEOUS at 19:22

## 2017-08-19 RX ADMIN — Medication 0.76 MG: at 07:24

## 2017-08-19 RX ADMIN — ONDANSETRON 8 MG: 2 INJECTION INTRAMUSCULAR; INTRAVENOUS at 15:20

## 2017-08-19 RX ADMIN — Medication 10 ML: at 15:22

## 2017-08-19 RX ADMIN — Medication 10 ML: at 15:11

## 2017-08-19 RX ADMIN — PANTOPRAZOLE SODIUM 40 MG: 40 TABLET, DELAYED RELEASE ORAL at 06:54

## 2017-08-19 RX ADMIN — Medication 10 ML: at 15:10

## 2017-08-19 RX ADMIN — MORPHINE SULFATE 30 MG: 15 TABLET, FILM COATED, EXTENDED RELEASE ORAL at 09:15

## 2017-08-19 RX ADMIN — Medication 0.76 MG: at 15:04

## 2017-08-19 RX ADMIN — MORPHINE SULFATE 30 MG: 15 TABLET, FILM COATED, EXTENDED RELEASE ORAL at 22:34

## 2017-08-19 RX ADMIN — ONDANSETRON 8 MG: 2 INJECTION INTRAMUSCULAR; INTRAVENOUS at 07:14

## 2017-08-19 RX ADMIN — HYDROMORPHONE HYDROCHLORIDE 1 MG: 1 INJECTION, SOLUTION INTRAMUSCULAR; INTRAVENOUS; SUBCUTANEOUS at 06:54

## 2017-08-19 RX ADMIN — Medication: at 16:44

## 2017-08-19 RX ADMIN — Medication: at 07:25

## 2017-08-19 NOTE — PROGRESS NOTES
Bedside and Verbal shift change report given to Pato Nair (oncoming nurse) by Thais Zhou RN (offgoing nurse). Report included the following information SBAR, Kardex, OR Summary and Procedure Summary.

## 2017-08-19 NOTE — PROGRESS NOTES
Bedside shift change report given to MAURI Costa (oncoming nurse) by Erich Lucas RN (offgoing nurse). Report included the following information SBAR, Kardex, ED Summary, [facility] and Recent Results.

## 2017-08-19 NOTE — PROGRESS NOTES
Subjective  Complaining of pain with the eakins pouch  Some nausea or vomiting    Temp:  [97.3 °F (36.3 °C)-98.8 °F (37.1 °C)]   Pulse (Heart Rate):  []   BP: ()/(63-86)   Resp Rate:  [18]   O2 Sat (%):  [95 %-100 %]   Weight:  [337 lb 8.4 oz (153.1 kg)-339 lb 8.1 oz (154 kg)]   08/19 0701 - 08/19 1900  In: 430 [I.V.:430]  Out: 525 [Drains:525]  08/17 1901 - 08/19 0700  In: 624.8 [I.V.:624.8]  Out: 3916 [Urine:3050; Drains:3995]      Objective  Gen- Alert in NAD  Lungs- CTA  H-rrr  Abd- pain with drainage from the wound. Mild leakage from around the pouch . Active Problems:    Abdominal pain (6/25/2017)      Small bowel ischemia ([address] (6/28/2017)      Overview: CT abd/pelvis 6/28/17      1. The stomach and proximal small bowel loops are distended. There is a       loop of      small bowel in the midabdomen which is mildly dilated and does not       demonstrate      enhancement in the wall and there is suspicion of pneumatosis and this       leads to      a loop of small bowel which demonstrates thickening of the wall and       findings are      suspicious for ischemia. 2. There is extensive mesenteric edema and a small amount of ascites in       the      pelvis. 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 There is nonocclusive thrombus in the SMA. 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)          Assessment & Plan  Continue TPN  May  need to have vac replaced on Monday if possible as she does not seem to be tolerating having it off. Try to ambulate if possible.

## 2017-08-19 NOTE — PROGRESS NOTES
Bedside shift change report given to [facility] CHARBEL (oncoming nurse) by Alice Moise (offgoing nurse). Report included the following information SBAR.

## 2017-08-20 LAB
ANION GAP SERPL CALC-SCNC: 8 MMOL/L (ref 5–15)
BASOPHILS # BLD: 0 K/UL (ref 0–0.1)
BASOPHILS NFR BLD: 0 % (ref 0–1)
BUN SERPL-MCNC: 22 MG/DL (ref 6–20)
BUN/CREAT SERPL: 33 (ref 12–20)
CALCIUM SERPL-MCNC: 8.8 MG/DL (ref 8.5–10.1)
CHLORIDE SERPL-SCNC: 105 MMOL/L (ref 97–108)
CO2 SERPL-SCNC: 25 MMOL/L (ref 21–32)
CREAT SERPL-MCNC: 0.67 MG/DL (ref 0.55–1.02)
EOSINOPHIL # BLD: 0.4 K/UL (ref 0–0.4)
EOSINOPHIL NFR BLD: 4 % (ref 0–7)
ERYTHROCYTE [DISTWIDTH] IN BLOOD BY AUTOMATED COUNT: 15.4 % (ref 11.5–14.5)
GLUCOSE BLD STRIP.AUTO-MCNC: 101 MG/DL (ref 65–100)
GLUCOSE BLD STRIP.AUTO-MCNC: 126 MG/DL (ref 65–100)
GLUCOSE SERPL-MCNC: 90 MG/DL (ref 65–100)
HCT VFR BLD AUTO: 27.4 % (ref 35–47)
HGB BLD-MCNC: 8.5 G/DL (ref 11.5–16)
LYMPHOCYTES # BLD: 2.9 K/UL (ref 0.8–3.5)
LYMPHOCYTES NFR BLD: 33 % (ref 12–49)
MAGNESIUM SERPL-MCNC: 1.9 MG/DL (ref 1.6–2.4)
MCH RBC QN AUTO: 28 PG (ref 26–34)
MCHC RBC AUTO-ENTMCNC: 31 G/DL (ref 30–36.5)
MCV RBC AUTO: 90.1 FL (ref 80–99)
MONOCYTES # BLD: 0.9 K/UL (ref 0–1)
MONOCYTES NFR BLD: 10 % (ref 5–13)
NEUTS SEG # BLD: 4.6 K/UL (ref 1.8–8)
NEUTS SEG NFR BLD: 53 % (ref 32–75)
PHOSPHATE SERPL-MCNC: 4.6 MG/DL (ref 2.6–4.7)
PLATELET # BLD AUTO: 500 K/UL (ref 150–400)
POTASSIUM SERPL-SCNC: 4.4 MMOL/L (ref 3.5–5.1)
RBC # BLD AUTO: 3.04 M/UL (ref 3.8–5.2)
SERVICE CMNT-IMP: ABNORMAL
SERVICE CMNT-IMP: ABNORMAL
SODIUM SERPL-SCNC: 138 MMOL/L (ref 136–145)
WBC # BLD AUTO: 8.9 K/UL (ref 3.6–11)

## 2017-08-20 PROCEDURE — 74011250636 HC RX REV CODE- 250/636: Performed by: INTERNAL MEDICINE

## 2017-08-20 PROCEDURE — 74011250636 HC RX REV CODE- 250/636: Performed by: EMERGENCY MEDICINE

## 2017-08-20 PROCEDURE — 80048 BASIC METABOLIC PNL TOTAL CA: CPT | Performed by: SURGERY

## 2017-08-20 PROCEDURE — 83735 ASSAY OF MAGNESIUM: CPT | Performed by: SURGERY

## 2017-08-20 PROCEDURE — 3331090001 HH PPS REVENUE CREDIT

## 2017-08-20 PROCEDURE — 85025 COMPLETE CBC W/AUTO DIFF WBC: CPT | Performed by: SURGERY

## 2017-08-20 PROCEDURE — 74011250637 HC RX REV CODE- 250/637: Performed by: SURGERY

## 2017-08-20 PROCEDURE — 36415 COLL VENOUS BLD VENIPUNCTURE: CPT | Performed by: SURGERY

## 2017-08-20 PROCEDURE — 74011000258 HC RX REV CODE- 258: Performed by: SURGERY

## 2017-08-20 PROCEDURE — 74011000250 HC RX REV CODE- 250: Performed by: SURGERY

## 2017-08-20 PROCEDURE — 65210000002 HC RM PRIVATE GYN

## 2017-08-20 PROCEDURE — 84100 ASSAY OF PHOSPHORUS: CPT | Performed by: SURGERY

## 2017-08-20 PROCEDURE — 82962 GLUCOSE BLOOD TEST: CPT

## 2017-08-20 PROCEDURE — 3331090002 HH PPS REVENUE DEBIT

## 2017-08-20 PROCEDURE — 77030020847 HC STATLOK BARD -A

## 2017-08-20 PROCEDURE — 74011250636 HC RX REV CODE- 250/636: Performed by: SURGERY

## 2017-08-20 PROCEDURE — 74011250636 HC RX REV CODE- 250/636: Performed by: PHYSICAL MEDICINE & REHABILITATION

## 2017-08-20 PROCEDURE — 3331090003 HH PPS REVENUE ADJ

## 2017-08-20 PROCEDURE — 74011250636 HC RX REV CODE- 250/636: Performed by: NURSE PRACTITIONER

## 2017-08-20 PROCEDURE — 74011636637 HC RX REV CODE- 636/637: Performed by: SURGERY

## 2017-08-20 RX ADMIN — ONDANSETRON 8 MG: 2 INJECTION INTRAMUSCULAR; INTRAVENOUS at 04:10

## 2017-08-20 RX ADMIN — Medication 10 ML: at 14:31

## 2017-08-20 RX ADMIN — HYDROMORPHONE HYDROCHLORIDE 1 MG: 1 INJECTION, SOLUTION INTRAMUSCULAR; INTRAVENOUS; SUBCUTANEOUS at 04:12

## 2017-08-20 RX ADMIN — HYDROMORPHONE HYDROCHLORIDE 1 MG: 1 INJECTION, SOLUTION INTRAMUSCULAR; INTRAVENOUS; SUBCUTANEOUS at 07:26

## 2017-08-20 RX ADMIN — Medication 0.76 MG: at 14:28

## 2017-08-20 RX ADMIN — HYDROMORPHONE HYDROCHLORIDE 1 MG: 1 INJECTION, SOLUTION INTRAMUSCULAR; INTRAVENOUS; SUBCUTANEOUS at 20:37

## 2017-08-20 RX ADMIN — Medication: at 00:54

## 2017-08-20 RX ADMIN — HYDROMORPHONE HYDROCHLORIDE 1 MG: 1 INJECTION, SOLUTION INTRAMUSCULAR; INTRAVENOUS; SUBCUTANEOUS at 14:39

## 2017-08-20 RX ADMIN — SODIUM CHLORIDE, SODIUM LACTATE, POTASSIUM CHLORIDE, AND CALCIUM CHLORIDE 25 ML/HR: 600; 310; 30; 20 INJECTION, SOLUTION INTRAVENOUS at 06:56

## 2017-08-20 RX ADMIN — Medication: at 23:43

## 2017-08-20 RX ADMIN — HYDROMORPHONE HYDROCHLORIDE 1 MG: 1 INJECTION, SOLUTION INTRAMUSCULAR; INTRAVENOUS; SUBCUTANEOUS at 02:00

## 2017-08-20 RX ADMIN — ASPIRIN 81 MG CHEWABLE TABLET 81 MG: 81 TABLET CHEWABLE at 10:27

## 2017-08-20 RX ADMIN — Medication 10 ML: at 14:00

## 2017-08-20 RX ADMIN — MORPHINE SULFATE 30 MG: 15 TABLET, FILM COATED, EXTENDED RELEASE ORAL at 10:27

## 2017-08-20 RX ADMIN — CLOPIDOGREL BISULFATE 75 MG: 75 TABLET ORAL at 10:27

## 2017-08-20 RX ADMIN — MORPHINE SULFATE 30 MG: 15 TABLET, FILM COATED, EXTENDED RELEASE ORAL at 21:00

## 2017-08-20 RX ADMIN — Medication 10 ML: at 21:55

## 2017-08-20 RX ADMIN — Medication: at 10:31

## 2017-08-20 RX ADMIN — POTASSIUM CHLORIDE: 2 INJECTION, SOLUTION, CONCENTRATE INTRAVENOUS at 18:38

## 2017-08-20 RX ADMIN — Medication 0.76 MG: at 21:54

## 2017-08-20 RX ADMIN — Medication: at 17:45

## 2017-08-20 RX ADMIN — Medication 10 ML: at 06:00

## 2017-08-20 RX ADMIN — Medication 0.76 MG: at 06:51

## 2017-08-20 RX ADMIN — PANTOPRAZOLE SODIUM 40 MG: 40 TABLET, DELAYED RELEASE ORAL at 06:51

## 2017-08-20 NOTE — PROGRESS NOTES
Bedside shift change report given to Bo Guzman and Alberta Erickson RN (oncoming nurse) by Keturah Hammond RN (offgoing nurse). Report included the following information SBAR, Kardex, Intake/Output, MAR and Recent Results.

## 2017-08-20 NOTE — PROGRESS NOTES
Bedside shift change report given to Cecilia Varela RN (oncoming nurse) by Rosaline Sloan RN (offgoing nurse). Report included the following information SBAR, OR Summary, Procedure Summary, Intake/Output, MAR and Accordion.

## 2017-08-20 NOTE — DISCHARGE INSTRUCTIONS
Aspirin (By mouth)   Aspirin (AS-pir-in)  Treats pain, fever, and inflammation. May lower risk of heart attack and stroke. Brand Name(s): Ascriptin Regular Strength, Aspergum, Aspir Low, Aspirin Adult Low Dose, Vani Aspirin Children's, Vani Aspirin Regimen, Vani Extra Strength, Vani Genuine Aspirin, Bufferin, Bufferin Low Dose, Durlaza, Ecotrin, Ecpirin, Enteric Aspirin, Genuine Aspirin   There may be other brand names for this medicine. When This Medicine Should Not Be Used: This medicine is not right for everyone. Do not use it if you had an allergic reaction to aspirin or other NSAIDs, or if you have a history of asthma with nasal polyps and rhinitis. How to Use This Medicine:   Delayed Release Capsule, Long Acting Capsule, Gum, Tablet, Chewable Tablet, Fizzy Tablet, Coated Tablet, Long Acting Tablet, 24 Hour Capsule  · Your doctor will tell you how much medicine to use. Do not use more than directed. · It is best to take this medicine with food or milk. · Capsule, tablet, or coated tablet: Swallow whole. Do not crush, break, or chew it. · Chewable tablet: You may chew it completely or swallow it whole. · Gum: Chew completely to make sure you get as much medicine as possible. Drink a full glass (8 ounces) of water after chewing the gum. · Swallow the extended-release capsule whole. Do not crush, break, or chew it. Take the capsule with a full glass of water at the same time each day. · Follow the instructions on the medicine label if you are using this medicine without a prescription. · Missed dose: If you miss a dose of Durlaza, skip the missed dose and go back to your regular dosing schedule. Do not take extra medicine to make up for a missed dose. · Store the medicine in a closed container at room temperature, away from heat, moisture, and direct light.   Drugs and Foods to Avoid:   Ask your doctor or pharmacist before using any other medicine, including over-the-counter medicines, vitamins, and herbal products. · Some foods and medicines can affect how aspirin works. Tell your doctor if you are using any of the following:  ¨ Dipyridamole, methotrexate, probenecid, sulfinpyrazone, ticlopidine  ¨ Blood thinner (including clopidogrel, prasugrel, ticagrelor, warfarin)  ¨ Blood pressure medicine  ¨ Medicine to treat seizures (including phenytoin, valproic acid)  ¨ NSAID pain or arthritis medicine (including celecoxib, diclofenac, ibuprofen, naproxen)  ¨ Steroid medicine (including dexamethasone, hydrocortisone, methylprednisolone, prednisolone, prednisone)  · Do not take Durlaza 2 hours before or 1 hour after you drink alcohol or take medicines that contain alcohol. Warnings While Using This Medicine:   · Tell your doctor if you are pregnant or breastfeeding. Do not use this medicine during the later part of a pregnancy unless your doctor tells you to. · Tell your doctor if you have kidney disease, liver disease, high blood pressure, heart disease, or a history of stomach bleeding or ulcers. · This medicine may increase your risk for bleeding, including stomach ulcers. · Do not give aspirin to a child or teenager who has chickenpox or flu symptoms, unless the doctor says it is okay. Aspirin can cause a life-threatening reaction called Reye syndrome. · Tell any doctor or dentist who treats you that you are using this medicine. This medicine may affect certain medical test results. · Keep all medicine out of the reach of children. Never share your medicine with anyone.   Possible Side Effects While Using This Medicine:   Call your doctor right away if you notice any of these side effects:  · Allergic reaction: Itching or hives, swelling in your face or hands, swelling or tingling in your mouth or throat, chest tightness, trouble breathing  · Bloody or black stools, bloody vomit or vomit that looks like coffee grounds  · Chest tightness, wheezing  · Ringing in the ears  · Severe stomach pain  · Unusual bleeding, bruising, or weakness  If you notice other side effects that you think are caused by this medicine, tell your doctor. Call your doctor for medical advice about side effects. You may report side effects to FDA at 3-208-KAK-0225  © 2017 [facility] Information is for End User's use only and may not be sold, redistributed or otherwise used for commercial purposes. The above information is an  only. It is not intended as medical advice for individual conditions or treatments. Talk to your doctor, nurse or pharmacist before following any medical regimen to see if it is safe and effective for you. Clopidogrel (By mouth)   Clopidogrel (kico-NBI-wj-grel)  Helps prevent stroke, heart attack, and other heart problems. This medicine is a platelet inhibitor. Brand Name(s): Plavix   There may be other brand names for this medicine. When This Medicine Should Not Be Used: This medicine is not right for everyone. Do not use it if you had an allergic reaction to clopidogrel. How to Use This Medicine:   Tablet  · Your doctor will tell you how much medicine to use. Do not use more than directed. · This medicine should come with a Medication Guide. Ask your pharmacist for a copy if you do not have one. · Missed dose: Take a dose as soon as you remember. If it is almost time for your next dose, wait until then and take a regular dose. Do not take extra medicine to make up for a missed dose. · Store the medicine in a closed container at room temperature, away from heat, moisture, and direct light. Drugs and Foods to Avoid:   Ask your doctor or pharmacist before using any other medicine, including over-the-counter medicines, vitamins, and herbal products. · Some medicines can affect how clopidogrel works.  Tell your doctor if you are using any of the following:   ¨ Esomeprazole, omeprazole, repaglinide, or warfarin  ¨ Medicine to treat depression  ¨ NSAID pain or arthritis medicine (including celecoxib, diclofenac, ibuprofen, or naproxen)  Warnings While Using This Medicine:   · Tell your doctor if you are pregnant or breastfeeding, or if you have bleeding problems, stomach ulcer or bleeding, a recent stroke, or have a history of bleeding problems. · This medicine can cause you to bleed and bruise more easily. Take precautions to avoid injury. Brush and floss your teeth gently, do not play rough sports, and be careful with sharp objects. Severe bleeding can be life-threatening. · This medicine may cause a rare but serious blood clotting condition called thrombotic thrombocytopenic purpura. · Make sure any doctor or dentist who treats you knows that you are using this medicine. Tell your doctor if you plan to have surgery or a dental procedure. · Do not stop using this medicine suddenly. Your doctor will need to slowly decrease your dose before you stop it completely. · Your doctor will check your progress and the effects of this medicine at regular visits. Keep all appointments. · Keep all medicine out of the reach of children. Never share your medicine with anyone. Possible Side Effects While Using This Medicine:   Call your doctor right away if you notice any of these side effects:  · Allergic reaction: Itching or hives, swelling in your face or hands, swelling or tingling in your mouth or throat, chest tightness, trouble breathing  · Bloody or black, tarry stools  · Nosebleeds  · Pinpoint red or purple spots on your skin or in your mouth  · Problems with vision, speech, or walking  · Red or dark brown urine  · Seizures  · Severe stomach pain  · Trouble breathing, tiredness, fast heartbeat, yellow skin or eyes  · Unusual bleeding, bruising, or weakness  · Vomiting of blood or vomit that looks like coffee grounds  If you notice other side effects that you think are caused by this medicine, tell your doctor. Call your doctor for medical advice about side effects.  You may report side effects to FDA at 8-891-FDA-7858  © 2017 [address] Information is for End User's use only and may not be sold, redistributed or otherwise used for commercial purposes. The above information is an  only. It is not intended as medical advice for individual conditions or treatments. Talk to your doctor, nurse or pharmacist before following any medical regimen to see if it is safe and effective for you. Abdominal Pain: Care Instructions  Your Care Instructions    Abdominal pain has many possible causes. Some aren't serious and get better on their own in a few days. Others need more testing and treatment. If your pain continues or gets worse, you need to be rechecked and may need more tests to find out what is wrong. You may need surgery to correct the problem. Don't ignore new symptoms, such as fever, nausea and vomiting, urination problems, pain that gets worse, and dizziness. These may be signs of a more serious problem. Your doctor may have recommended a follow-up visit in the next 8 to 12 hours. If you are not getting better, you may need more tests or treatment. The doctor has checked you carefully, but problems can develop later. If you notice any problems or new symptoms, get medical treatment right away. Follow-up care is a key part of your treatment and safety. Be sure to make and go to all appointments, and call your doctor if you are having problems. It's also a good idea to know your test results and keep a list of the medicines you take. How can you care for yourself at home? · Rest until you feel better. · To prevent dehydration, drink plenty of fluids, enough so that your urine is light yellow or clear like water. Choose water and other caffeine-free clear liquids until you feel better. If you have kidney, heart, or liver disease and have to limit fluids, talk with your doctor before you increase the amount of fluids you drink.   · If your stomach is upset, eat mild foods, such as rice, dry toast or crackers, bananas, and applesauce. Try eating several small meals instead of two or three large ones. · Wait until 48 hours after all symptoms have gone away before you have spicy foods, alcohol, and drinks that contain caffeine. · Do not eat foods that are high in fat. · Avoid anti-inflammatory medicines such as aspirin, ibuprofen (Advil, Motrin), and naproxen (Aleve). These can cause stomach upset. Talk to your doctor if you take daily aspirin for another health problem. When should you call for help? Call 911 anytime you think you may need emergency care. For example, call if:  · You passed out (lost consciousness). · You pass maroon or very bloody stools. · You vomit blood or what looks like coffee grounds. · You have new, severe belly pain. Call your doctor now or seek immediate medical care if:  · Your pain gets worse, especially if it becomes focused in one area of your belly. · You have a new or higher fever. · Your stools are black and look like tar, or they have streaks of blood. · You have unexpected vaginal bleeding. · You have symptoms of a urinary tract infection. These may include:  ¨ Pain when you urinate. ¨ Urinating more often than usual.  ¨ Blood in your urine. · You are dizzy or lightheaded, or you feel like you may faint. Watch closely for changes in your health, and be sure to contact your doctor if:  · You are not getting better after 1 day (24 hours). Where can you learn more? Go to http://cornell-terrell.info/. Enter C480 in the search box to learn more about \"Abdominal Pain: Care Instructions. \"  Current as of: March 20, 2017  Content Version: 11.3  © 9609-1319 [employer]. Care instructions adapted under license by [employer] (which disclaims liability or warranty for this information).  If you have questions about a medical condition or this instruction, always ask your healthcare professional. [address] any warranty or liability for your use of this information. Physician Discharge Instructions     Patient ID:  David Otoole  [id number]  85 y.o.  1975    Admit Date: 6/25/2017    Discharge Date: 8/21/2017    Admission Diagnoses: N/V intractable post-opt pain  Abdominal pain  poor iv access  DEAD BOWEL  SMALL BOWEL OBSTRUCTION  central line placement  Abdominal pain    Discharge Diagnoses: Active Problems:    Abdominal pain (6/25/2017)      Small bowel ischemia ([address] (6/28/2017)      Overview: CT abd/pelvis 6/28/17      1. The stomach and proximal small bowel loops are distended. There is a       loop of      small bowel in the midabdomen which is mildly dilated and does not       demonstrate      enhancement in the wall and there is suspicion of pneumatosis and this       leads to      a loop of small bowel which demonstrates thickening of the wall and       findings are      suspicious for ischemia. 2. There is extensive mesenteric edema and a small amount of ascites in       the      pelvis. 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 There is nonocclusive thrombus in the SMA. 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)         Last Procedure: Procedure(s) with comments:  SPECIAL PROCEDURE OUTSIDE OF OR - central line placement      Patient Instructions:   Wound care instructions per wound care nursing. TPN for diet. May have sips of clear liquids    Red rubber catheter - maintain to gravity drainage    1. Continue Morphine PCA  1. Decreased basal to zero. 2. Continue 5mg IV -- new interval Q15 min PCA      Signed:   Albino Szymanski NP  8/21/2017  1:40 PM

## 2017-08-20 NOTE — PROGRESS NOTES
Subjective  Complains of pain and leaking with eakens pouch    Temp:  [97.3 °F (36.3 °C)-98.8 °F (37.1 °C)]   Pulse (Heart Rate):  []   BP: ()/()   Resp Rate:  [18-20]   O2 Sat (%):  [95 %-100 %]   Weight:  [337 lb 8.4 oz (153.1 kg)-339 lb 8.1 oz (154 kg)]   08/20 0701 - 08/20 1900  In: -   Out: 2000 [Urine:700; Drains:1300]  08/18 1901 - 08/20 0700  In: 1714.2 [I.V.:1714.2]  Out: 7170 [Urine:3550; Drains:3620]      Objective  Gen- Alert in NAD  Lungs- CTA  H-RRR  Abd- fistula draining with some redness around the wound    Recent Results (from the past 24 hour(s))   GLUCOSE, POC    Collection Time: 08/19/17  9:18 PM   Result Value Ref Range    Glucose (POC) 122 (H) 65 - 100 mg/dL    Performed by [facility]    METABOLIC PANEL, BASIC    Collection Time: 08/20/17  4:47 AM   Result Value Ref Range    Sodium 138 136 - 145 mmol/L    Potassium 4.4 3.5 - 5.1 mmol/L    Chloride 105 97 - 108 mmol/L    CO2 25 21 - 32 mmol/L    Anion gap 8 5 - 15 mmol/L    Glucose 90 65 - 100 mg/dL    BUN 22 (H) 6 - 20 MG/DL    Creatinine 0.67 0.55 - 1.02 MG/DL    BUN/Creatinine ratio 33 (H) 12 - 20      GFR est AA >60 >60 ml/min/1.73m2    GFR est non-AA >60 >60 ml/min/1.73m2    Calcium 8.8 8.5 - 10.1 MG/DL   CBC WITH AUTOMATED DIFF    Collection Time: 08/20/17  4:47 AM   Result Value Ref Range    WBC 8.9 3.6 - 11.0 K/uL    RBC 3.04 (L) 3.80 - 5.20 M/uL    HGB 8.5 (L) 11.5 - 16.0 g/dL    HCT 27.4 (L) 35.0 - 47.0 %    MCV 90.1 80.0 - 99.0 FL    MCH 28.0 26.0 - 34.0 PG    MCHC 31.0 30.0 - 36.5 g/dL    RDW 15.4 (H) 11.5 - 14.5 %    PLATELET 431 (H) 496 - 400 K/uL    NEUTROPHILS 53 32 - 75 %    LYMPHOCYTES 33 12 - 49 %    MONOCYTES 10 5 - 13 %    EOSINOPHILS 4 0 - 7 %    BASOPHILS 0 0 - 1 %    ABS. NEUTROPHILS 4.6 1.8 - 8.0 K/UL    ABS. LYMPHOCYTES 2.9 0.8 - 3.5 K/UL    ABS. MONOCYTES 0.9 0.0 - 1.0 K/UL    ABS. EOSINOPHILS 0.4 0.0 - 0.4 K/UL    ABS.  BASOPHILS 0.0 0.0 - 0.1 K/UL   MAGNESIUM    Collection Time: 08/20/17  4:47 AM   Result Value Ref Range    Magnesium 1.9 1.6 - 2.4 mg/dL   PHOSPHORUS    Collection Time: 08/20/17  4:47 AM   Result Value Ref Range    Phosphorus 4.6 2.6 - 4.7 MG/DL   GLUCOSE, POC    Collection Time: 08/20/17 11:42 AM   Result Value Ref Range    Glucose (POC) 101 (H) 65 - 100 mg/dL    Performed by Tracey Hayes          Active Problems:    Abdominal pain (6/25/2017)      Small bowel ischemia ([address] (6/28/2017)      Overview: CT abd/pelvis 6/28/17      1. The stomach and proximal small bowel loops are distended. There is a       loop of      small bowel in the midabdomen which is mildly dilated and does not       demonstrate      enhancement in the wall and there is suspicion of pneumatosis and this       leads to      a loop of small bowel which demonstrates thickening of the wall and       findings are      suspicious for ischemia. 2. There is extensive mesenteric edema and a small amount of ascites in       the      pelvis. 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 There is nonocclusive thrombus in the SMA. 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 Will need to have a stable dressing prior to transfer to rehab.    Will have palliative reeval for possible transition  Off PCA for transfer to rehab

## 2017-08-21 LAB
GLUCOSE BLD STRIP.AUTO-MCNC: 88 MG/DL (ref 65–100)
GLUCOSE BLD STRIP.AUTO-MCNC: 98 MG/DL (ref 65–100)
SERVICE CMNT-IMP: NORMAL
SERVICE CMNT-IMP: NORMAL

## 2017-08-21 PROCEDURE — 74011250637 HC RX REV CODE- 250/637: Performed by: SURGERY

## 2017-08-21 PROCEDURE — 74011636637 HC RX REV CODE- 636/637: Performed by: SURGERY

## 2017-08-21 PROCEDURE — 77030010540 HC BG OST DRN BMS -A

## 2017-08-21 PROCEDURE — 65210000002 HC RM PRIVATE GYN

## 2017-08-21 PROCEDURE — 74011250636 HC RX REV CODE- 250/636: Performed by: PHYSICAL MEDICINE & REHABILITATION

## 2017-08-21 PROCEDURE — 74011250636 HC RX REV CODE- 250/636: Performed by: SURGERY

## 2017-08-21 PROCEDURE — 74011000250 HC RX REV CODE- 250: Performed by: INTERNAL MEDICINE

## 2017-08-21 PROCEDURE — 77030011641 HC PASTE OST ADH BMS -A

## 2017-08-21 PROCEDURE — 77030010520

## 2017-08-21 PROCEDURE — 76450000000

## 2017-08-21 PROCEDURE — 74011250636 HC RX REV CODE- 250/636: Performed by: INTERNAL MEDICINE

## 2017-08-21 PROCEDURE — 74011250636 HC RX REV CODE- 250/636: Performed by: NURSE PRACTITIONER

## 2017-08-21 PROCEDURE — 74011250637 HC RX REV CODE- 250/637: Performed by: INTERNAL MEDICINE

## 2017-08-21 PROCEDURE — 74011000258 HC RX REV CODE- 258: Performed by: SURGERY

## 2017-08-21 PROCEDURE — 74011250636 HC RX REV CODE- 250/636: Performed by: EMERGENCY MEDICINE

## 2017-08-21 PROCEDURE — 74011000250 HC RX REV CODE- 250: Performed by: SURGERY

## 2017-08-21 PROCEDURE — 74011000250 HC RX REV CODE- 250: Performed by: NURSE PRACTITIONER

## 2017-08-21 PROCEDURE — 82962 GLUCOSE BLOOD TEST: CPT

## 2017-08-21 RX ORDER — HYDROMORPHONE HYDROCHLORIDE 1 MG/ML
1 INJECTION, SOLUTION INTRAMUSCULAR; INTRAVENOUS; SUBCUTANEOUS
Qty: 20 ML | Refills: 0 | Status: SHIPPED | OUTPATIENT
Start: 2017-08-21 | End: 2017-08-23

## 2017-08-21 RX ORDER — GUAIFENESIN 100 MG/5ML
81 LIQUID (ML) ORAL DAILY
Qty: 30 TAB | Refills: 0 | Status: SHIPPED
Start: 2017-08-21 | End: 2018-02-27

## 2017-08-21 RX ORDER — MORPHINE SULFATE 15 MG/1
45 TABLET, FILM COATED, EXTENDED RELEASE ORAL EVERY 8 HOURS
Status: DISCONTINUED | OUTPATIENT
Start: 2017-08-21 | End: 2017-08-22

## 2017-08-21 RX ORDER — PANTOPRAZOLE SODIUM 40 MG/1
40 TABLET, DELAYED RELEASE ORAL
Qty: 30 TAB | Refills: 0 | Status: ON HOLD
Start: 2017-08-21 | End: 2017-10-27

## 2017-08-21 RX ORDER — ONDANSETRON 2 MG/ML
8 INJECTION INTRAMUSCULAR; INTRAVENOUS
Qty: 50 ML | Refills: 0 | Status: ON HOLD
Start: 2017-08-21 | End: 2017-10-17

## 2017-08-21 RX ORDER — MORPHINE SULFATE 15 MG/1
45 TABLET, FILM COATED, EXTENDED RELEASE ORAL EVERY 8 HOURS
Qty: 90 TAB | Refills: 0 | Status: SHIPPED | OUTPATIENT
Start: 2017-08-21 | End: 2017-08-23

## 2017-08-21 RX ORDER — CLOPIDOGREL BISULFATE 75 MG/1
75 TABLET ORAL DAILY
Qty: 30 TAB | Refills: 0 | Status: SHIPPED
Start: 2017-08-21 | End: 2017-10-27

## 2017-08-21 RX ADMIN — Medication: at 07:41

## 2017-08-21 RX ADMIN — SODIUM CHLORIDE 5 MG: 9 INJECTION INTRAMUSCULAR; INTRAVENOUS; SUBCUTANEOUS at 17:29

## 2017-08-21 RX ADMIN — PANTOPRAZOLE SODIUM 40 MG: 40 TABLET, DELAYED RELEASE ORAL at 07:30

## 2017-08-21 RX ADMIN — Medication 20 ML: at 05:45

## 2017-08-21 RX ADMIN — Medication 0.76 MG: at 14:21

## 2017-08-21 RX ADMIN — THIAMINE HYDROCHLORIDE: 100 INJECTION, SOLUTION INTRAMUSCULAR; INTRAVENOUS at 22:31

## 2017-08-21 RX ADMIN — ONDANSETRON 8 MG: 2 INJECTION INTRAMUSCULAR; INTRAVENOUS at 22:53

## 2017-08-21 RX ADMIN — SODIUM CHLORIDE 5 MG: 9 INJECTION INTRAMUSCULAR; INTRAVENOUS; SUBCUTANEOUS at 05:58

## 2017-08-21 RX ADMIN — HYDROMORPHONE HYDROCHLORIDE 1 MG: 1 INJECTION, SOLUTION INTRAMUSCULAR; INTRAVENOUS; SUBCUTANEOUS at 22:47

## 2017-08-21 RX ADMIN — Medication 0.76 MG: at 22:48

## 2017-08-21 RX ADMIN — HYDROMORPHONE HYDROCHLORIDE 1 MG: 1 INJECTION, SOLUTION INTRAMUSCULAR; INTRAVENOUS; SUBCUTANEOUS at 09:47

## 2017-08-21 RX ADMIN — Medication 0.76 MG: at 05:44

## 2017-08-21 RX ADMIN — I.V. FAT EMULSION 500 ML: 20 EMULSION INTRAVENOUS at 22:49

## 2017-08-21 RX ADMIN — MORPHINE SULFATE 45 MG: 15 TABLET, FILM COATED, EXTENDED RELEASE ORAL at 22:47

## 2017-08-21 RX ADMIN — Medication: at 17:12

## 2017-08-21 RX ADMIN — HYDROMORPHONE HYDROCHLORIDE 1 MG: 1 INJECTION, SOLUTION INTRAMUSCULAR; INTRAVENOUS; SUBCUTANEOUS at 17:26

## 2017-08-21 RX ADMIN — CLOPIDOGREL BISULFATE 75 MG: 75 TABLET ORAL at 09:10

## 2017-08-21 RX ADMIN — ONDANSETRON 8 MG: 2 INJECTION INTRAMUSCULAR; INTRAVENOUS at 02:11

## 2017-08-21 RX ADMIN — MORPHINE SULFATE 30 MG: 15 TABLET, FILM COATED, EXTENDED RELEASE ORAL at 09:10

## 2017-08-21 RX ADMIN — Medication 10 ML: at 22:48

## 2017-08-21 RX ADMIN — MORPHINE SULFATE 45 MG: 15 TABLET, FILM COATED, EXTENDED RELEASE ORAL at 14:21

## 2017-08-21 RX ADMIN — ASPIRIN 81 MG CHEWABLE TABLET 81 MG: 81 TABLET CHEWABLE at 09:10

## 2017-08-21 NOTE — PROGRESS NOTES
Bedside and Verbal shift change report given to Audrey Kitchen RN (oncoming nurse) by Vish Swain RN (offgoing nurse). Report included the following information SBAR, Kardex, OR Summary, Procedure Summary, Intake/Output, MAR and Accordion.

## 2017-08-21 NOTE — PROGRESS NOTES
Supportive visit with Ms. Somers Parents after pt's case discussed with Palliative IDT. Pt shared that she expects to go to [facility] today. Offered listening presence, exploring pt's concerns as she shared her sense of uncertainty related to moving to a new facility. Normalized her feelings and offered emotional support. Pt then indicated that she needed assistance from her nurse - message relayed to nurse. Offered assurance of prayers to Amos Luque as visit ended.     Bhumika Landa, Palliative

## 2017-08-21 NOTE — DISCHARGE SUMMARY
Physician Discharge Summary     Patient ID:  Florin Gonzales  [id number]  82 y.o.  1975    Admit Date: 6/25/2017    Discharge Date: 8/21/2017    Admission Diagnoses: N/V intractable post-opt pain  Abdominal pain  poor iv access  DEAD BOWEL  SMALL BOWEL OBSTRUCTION  central line placement  Abdominal pain    Discharge Diagnoses: Active Problems:    Abdominal pain (6/25/2017)      Small bowel ischemia ([address] (6/28/2017)      Overview: CT abd/pelvis 6/28/17      1. The stomach and proximal small bowel loops are distended. There is a       loop of      small bowel in the midabdomen which is mildly dilated and does not       demonstrate      enhancement in the wall and there is suspicion of pneumatosis and this       leads to      a loop of small bowel which demonstrates thickening of the wall and       findings are      suspicious for ischemia. 2. There is extensive mesenteric edema and a small amount of ascites in       the      pelvis. 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 There is nonocclusive thrombus in the SMA. 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 - Rajni  6/28/17: Ex laparotomy with extensive Soledad, repair of enterotomy, SMA stent on the left. - Soumya/Dung/Denny    Pt was maintained on plavix and ASA after the 6/28 surgery. She had a fistula which required daily wound care, TPN, and NPO. She was maintained on pain mgmt per Palliative medicine. She was basically bed-bound throughout her hospitalization hortencai after the second surgery which required multiple drains.   These were all eventually removed as was the wound vac. She maintained one red rubber drain. She had a wound manager on her mid abd fistula wound which improved over time. She was maintained on abx per ID. She had PT/OT and was able to get OOB when she didn't have so many drains. She was discharged on PCA of morphine, TPN, and po pain meds as well. She is going to [facility]. Consults: Cardiology, GI, ID, Vascular surgery, Pulmonary, Palliative Care    Disposition: long term care facility [facility]    Patient Instructions:   Current Discharge Medication List      START taking these medications    Details   aspirin 81 mg chewable tablet Take 1 Tab by mouth daily. Qty: 30 Tab, Refills: 0      clopidogrel (PLAVIX) 75 mg tab Take 1 Tab by mouth daily. Qty: 30 Tab, Refills: 0      pantoprazole (PROTONIX) 40 mg tablet Take 1 Tab by mouth Daily (before breakfast). Qty: 30 Tab, Refills: 0      HYDROmorphone, PF, (DILAUDID) 1 mg/mL injection 1 mL by IntraVENous route every one (1) hour as needed (Breakthrough pain). Max Daily Amount: 24 mg. Qty: 20 mL, Refills: 0      ondansetron (ZOFRAN) 4 mg/2 mL soln 4 mL by IntraVENous route every six (6) hours as needed. Qty: 50 mL, Refills: 0         CONTINUE these medications which have CHANGED    Details   morphine CR (MS CONTIN) 15 mg CR tablet Take 3 Tabs by mouth every eight (8) hours. Max Daily Amount: 135 mg.  Qty: 90 Tab, Refills: 0         CONTINUE these medications which have NOT CHANGED    Details   albuterol sulfate SR (PROVENTIL SR) 4 mg ER tablet Take 4 mg by mouth daily as needed for Other (Wheezing). Tries to alternate with albuterol MDI when she doesn't need rapid resolution of symptoms      fluticasone-vilanterol (BREO ELLIPTA) 200-25 mcg/dose inhaler Take 1 Puff by inhalation daily. albuterol (PROVENTIL HFA, VENTOLIN HFA, PROAIR HFA) 90 mcg/actuation inhaler Take 2 Puffs by inhalation every four (4) hours as needed for Wheezing.       ALPRAZOLAM (XANAX PO) Take 1 mg by mouth three (3) times daily as needed. STOP taking these medications       sucralfate (CARAFATE) 100 mg/mL suspension Comments:   Reason for Stopping:         docusate sodium (COLACE) 100 mg capsule Comments:   Reason for Stopping:         lactobacillus comb no.10 (PROBIOTIC) 20 billion cell cap Comments:   Reason for Stopping:         HYDROmorphone (DILAUDID) 2 mg tablet Comments:   Reason for Stopping:         clindamycin (CLEOCIN) 300 mg capsule Comments:   Reason for Stopping:         cyanocobalamin, vitamin B-12, 5,000 mcg TbDL Comments:   Reason for Stopping:         promethazine (PHENERGAN) 25 mg tablet Comments:   Reason for Stopping:         norgestrel-ethinyl estradiol (CRYSELLE, 28,) 0.3-30 mg-mcg tab Comments:   Reason for Stopping:         esomeprazole (NEXIUM) 20 mg capsule Comments:   Reason for Stopping:         ondansetron (ZOFRAN ODT) 8 mg disintegrating tablet Comments:   Reason for Stopping:         methylnaltrexone (RELISTOR) 150 mg tab tablet Comments:   Reason for Stopping:         predniSONE (DELTASONE) 20 mg tablet Comments:   Reason for Stopping:         morphine IR (MS IR) 30 mg tablet Comments:   Reason for Stopping:         INFLIXIMAB (REMICADE IV) Comments:   Reason for Stopping:             Wound care per [facility] instructions. TPN for nutrition and may have sips of clears  PCA per order      Follow-up with Dr. Shola Callahan when able. Signed: Vinny Ridley NP  8/21/2017  3:28 PM    ADDENDUM For CODING QUERY:  Rufina Metcalf MD  During the second operation for bowel ischemia, several enterotomies were made by the operative surgeons. These enterotomy were repaired however she developed fistula from the enterotomies.

## 2017-08-21 NOTE — WOUND CARE
WOCN Note:     Follow-up visit for fistula pouch change. Chart shows:  Admitted for nausea, vomiting, and abdominal pain; history of Crohn's, DVT, bowel perforation with surgery 6/7/17. Exploratory lap, KATHRINE, fistula exclusion and tube placement by Dr. Luz Elena Davila 7/7/17.      Assessment:   Patient is A&O x4 and mobile around room.    Bed: total care bariatric sport  Patient using PureWick.    Diet: NPO with sips & TPN  Pre-medicated for pain by RN and uses PCA during wound care. Mucus fistula LLQ: red & moist and almost at skin level; some mucosal transplantation noted; output is light green mucus. New Activelife, 1-piece, flat pouch placed today.       LUQ red rubber catheter to bedside bag. Bilious output in bedside bag.  The insertion of this catheter is seen on the left margin of wound bed into proximal lumen of small intestine. Effluent leaks around cath insertion. red rubber cath secured with tape to skin. Insertion site of [place] drain placed in OR & now removed; RUQ = 0.5 x 0.5 x 0.3 cm.  100% moist red.  Moist plug of packing placed.      Rabia drain with suture detached today and drain falling out. Seen with Mayo Chaney, NP, and site covered with gauze after it slid out.       Midline abdominal dehisced incision = 11 x 6 x 1 cm.  Ana inward with puckered margins and deep creases @ 3 & 9 o'clock. Base is 70% moist red mucosal tissue with peristalsis & 30% moist pink with some mucus. Skin protected with [place] all around wound. Pouched using a large Hawk's wound manager with paste and plugs of Hawk's rings in dc on left and right and connected to bedside bag. Recommendations: The last fistula pouch lasted 5 days with a small leak on day three that was patched. Would recommend changing pouch every three days to get ahead of leaks or as often as needed to keep effluent off of her skin.    Change mucus fistula pouch in LLQ as needed ~weekly. Encourage ambulation and continence. Discussed above plan with patient, and RN, Lori Mcnamara, and NP, Sherwin Hampton. Transition of Care: discussion at bedside today for transfer to [facility].      YOLANDA FinneyN, RN, [facility]  Certified Wound, Ostomy, Continence Nurse  office [phone]  pager [phone] or call  to page

## 2017-08-21 NOTE — CONSULTS
Palliative Medicine Consult  Joe: [phone] ([phone])    Patient Name: Kat Hampton  YOB: 1975    Date of Initial Consult: 6/27/17  Reason for Consult: overwhelming symptoms  Requesting Provider: Cate Milian NP  Primary Care Physician: Samuel Royal MD      SUMMARY:   Kat Hampton is a 46 y.o. with a past history of Crohn's disease,anxiety,  chronic pain, hx DVT, multiple (4) small bowel resections, s/p recent urgent dx lap, with lysis of adhesions, repair of suspected perf 6/7/17 , who was admitted on 6/25/2017 from home with a diagnosis of worsening abdominal pain, elevated WBC and MRSA wound infection. Current medical issues leading to Palliative Medicine involvement include: Crohn's flare (seen on enteroscopy), abdominal pain, MRSA wound infection, post op wound pain, nausea. .    Patient is followed chronically for pain management by Dr. Donna Driscoll at [facility]. Home regimen for chronic abdominal pain is MS Contin 60 Q8 and MS IR 30 Q6 PRN. She also takes Xanax 1mg TID prn anxiety    6/7/17 exp lap  6/28/17 exp lap  7/7/17 exp lap, lysis of adhesions, fistula excl     PALLIATIVE DIAGNOSES:   1. Acute on chronic abdominal pain. Multiple abdominal surgeries  2. Nausea  3. Crohn's disease (currently off meds due to acute infection/surgery)  4. MRSA wound   5. Anxiety about health  6. Homesickness  7. Acute grief, change in health. PLAN:   1. Pain   1. Continue Dilaudid 1mg IV Q3hr PRN pain  2. Increase MS Contin 45mg PO Q8 hrs. (was 30mg Q12). Previous home regimen was 60mg Q8.  3. Continue Morphine PCA  1. Decrease basal from 4mg/hr to 1mg /hr  2. Continue 5mg IV Q10min PCA dose  4. Further titration of pain medications at [address] [place] [address]. 2.  supportive visit, discussed transition to [address] [place] [address] as positive step (she is well enough to go there). Good short term goal would be getting to gym at [address] [place] [address] so her dog can visit.   3. Discussed with surgery team, bedside nurse, wound care.        GOALS OF CARE / TREATMENT PREFERENCES:   [====Goals of Care====]  GOALS OF CARE:  Patient / health care proxy stated goals: pain control  Recovery from acute issues      TREATMENT PREFERENCES:   Code Status: Full Code    Advance Care Planning:  Advance Care Planning 6/26/2017   Patient's Healthcare Decision Maker is: Legal Next of Camilo Jiménez   Primary Decision Maker Name Tato Dave   Primary Decision Maker Phone Number [phone]   Primary Decision Maker Relationship to Patient Parent   Confirm Advance Directive None   Patient Would Like to Complete Advance Directive No       Other:    The palliative care team has discussed with patient / health care proxy about goals of care / treatment preferences for patient.  [====Goals of Care====]         HISTORY:     History obtained from: chart, patient    CHIEF COMPLAINT: admitted with abdominal pain    HPI/SUBJECTIVE:    The patient is:   [] Verbal and participatory  [] Non-participatory due to:   46year old   Recent abdominal surgery 6/7/17   Went home 6/21  Returned 6/25 with acute increase in abdominal pain  Which feels like her crohn's flares. Wound is not really as painful as rest of abdomen. It was 10/10 at home, really unbearable. Also nauseated. Dilaudid helping here, but doesn't last long. Pain at best 5/10 right now 8/10  Also nauseated all the time  Compazine helps the best, zofran doesn't work much but a little    Feeling more thirsty than usual     Has chronic pain from crohns, well managed on current morphine doses at home. Allows her to function. She follows at [facility] with Dr. Didi Quiñones.      Clinical Pain Assessment (nonverbal scale for severity on nonverbal patients):   [++++ Clinical Pain Assessment++++]  [++++Pain Severity++++]: Pain: 7  [++++Pain Character++++]: sharp and burning, stabbing  [++++Pain Duration++++]: several days  [++++Pain Effect++++]: hard to walk, feeling anxious  [++++Pain Factors++++]: better after pain medicaion  [++++Pain Frequency++++]: constant  [++++Pain Location++++]: across abdomen both sides in middle  [++++ Clinical Pain Assessment++++]     FUNCTIONAL ASSESSMENT:     Palliative Performance Scale (PPS):  PPS: 40       PSYCHOSOCIAL/SPIRITUAL SCREENING:     Advance Care Planning:  Advance Care Planning 6/26/2017   Patient's Healthcare Decision Maker is: Legal Next of Camilo Jiménez   Primary Decision Maker Name Fritz Decker   Primary Decision Maker Phone Number [phone]   Primary Decision Maker Relationship to Patient Parent   Confirm Advance Directive None   Patient Would Like to Complete Advance Directive No        Any spiritual / Confucianism concerns:  [] Yes /  [x] No    Caregiver Burnout:  [] Yes /  [x] No /  [] No Caregiver Present      Anticipatory grief assessment:   [x] Normal  / [] Maladaptive       ESAS Anxiety: Anxiety: 3    ESAS Depression: Depression: 2        REVIEW OF SYSTEMS:     Positive and pertinent negative findings in ROS are noted above in HPI. The following systems were [x] reviewed / [] unable to be reviewed as noted in HPI  Other findings are noted below. Systems: constitutional, ears/nose/mouth/throat, respiratory, gastrointestinal, genitourinary, musculoskeletal, integumentary, neurologic, psychiatric, endocrine. Positive findings noted below. Modified ESAS Completed by: provider   Fatigue: 4 Drowsiness: 0   Depression: 2 Pain: 7   Anxiety: 3 Nausea: 5   Anorexia: 0 Dyspnea: 0     Constipation: No              PHYSICAL EXAM:     From RN flowsheet:  Wt Readings from Last 3 Encounters:   08/20/17 338 lb 6.5 oz (153.5 kg)   06/25/17 343 lb (155.6 kg)   06/22/17 343 lb (155.6 kg)     Blood pressure 124/72, pulse (!) 109, temperature 98.3 °F (36.8 °C), resp. rate 18, height 5' 4\" (1.626 m), weight 338 lb 6.5 oz (153.5 kg), SpO2 98 %, not currently breastfeeding.     Pain Scale 1: Visual  Pain Intensity 1: 0  Pain Onset 1:  (Chronic)  Pain Location 1: Abdomen  Pain Orientation 1: Anterior, Mid  Pain Description 1: Aching, Constant  Pain Intervention(s) 1: Medication (see MAR)  Last bowel movement, if known:     Constitutional: WDWN female NAD  Eyes: pupils equal, anicteric  ENMT: no nasal discharge, moist mucous membranes  Cardiovascular: regular rhythm, distal pulses intact  Respiratory: breathing not labored, symmetric  Gastrointestinal: midline incision healing, no exudate +ostomy w stool  Musculoskeletal: no deformity, no tenderness to palpation  Skin: warm, dry  No edema  Neurologic: following commands, moving all extremities  Able to ambulate  Psychiatric: full affect, anxious/almost tearful at times  Other:       HISTORY:     Active Problems:    Abdominal pain (6/25/2017)      Small bowel ischemia ([address] (6/28/2017)      Overview: CT abd/pelvis 6/28/17      1. The stomach and proximal small bowel loops are distended. There is a       loop of      small bowel in the midabdomen which is mildly dilated and does not       demonstrate      enhancement in the wall and there is suspicion of pneumatosis and this       leads to      a loop of small bowel which demonstrates thickening of the wall and       findings are      suspicious for ischemia. 2. There is extensive mesenteric edema and a small amount of ascites in       the      pelvis. 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 There is nonocclusive thrombus in the SMA.             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)       Past Surgical History:   Procedure Laterality Date    HX OTHER SURGICAL      multiple procedures related to her Crohn's disease    SC LAP, INCISIONAL HERNIA REPAIR,INCARCERATED  9-10-08    dr. Ulices Dutta      Family History Problem Relation Age of Onset    Hypertension Father     Stroke Father     Other Father      arthritis      History reviewed, no pertinent family history.   Social History   Substance Use Topics    Smoking status: Former Smoker    Smokeless tobacco: Not on file    Alcohol use No     Allergies   Allergen Reactions    Demerol [Meperidine] Unknown (comments)    Soma [Carisoprodol] Rash and Nausea Only    Sulfa (Sulfonamide Antibiotics) Unknown (comments)    Toradol [Ketorolac Tromethamine] Unknown (comments)      Current Facility-Administered Medications   Medication Dose Route Frequency    morphine CR (MS CONTIN) tablet 45 mg  45 mg Oral Q8H    insulin lispro (HUMALOG) injection   SubCUTAneous BID    pantoprazole (PROTONIX) tablet 40 mg  40 mg Oral ACB    0.9% sodium chloride infusion 250 mL  250 mL IntraVENous PRN    acetaminophen (TYLENOL) tablet 650 mg  650 mg Oral Q4H PRN    LORazepam (ATIVAN) injection 0.76 mg  0.76 mg IntraVENous Q8H    clopidogrel (PLAVIX) tablet 75 mg  75 mg Oral DAILY    sodium chloride (NS) flush 10 mL  10 mL InterCATHeter Q24H    sodium chloride (NS) flush 10 mL  10 mL InterCATHeter PRN    sodium chloride (NS) flush 10-40 mL  10-40 mL InterCATHeter Q8H    alteplase (CATHFLO) 1 mg in sterile water (preservative free) 1 mL injection  1 mg InterCATHeter PRN    bacitracin 500 unit/gram packet 1 Packet  1 Packet Topical PRN    0.9% sodium chloride infusion 250 mL  250 mL IntraVENous PRN    lactated Ringers infusion  25 mL/hr IntraVENous CONTINUOUS    fat emulsion 20% (LIPOSYN, INTRALIPID) infusion 500 mL  500 mL IntraVENous Q MON, WED & FRI    HYDROmorphone (PF) (DILAUDID) injection 1 mg  1 mg IntraVENous Q1H PRN    aspirin chewable tablet 81 mg  81 mg Oral DAILY    morphine (PF)  mg/30 ml   IntraVENous CONTINUOUS    glucose chewable tablet 16 g  4 Tab Oral PRN    glucagon (GLUCAGEN) injection 1 mg  1 mg IntraMUSCular PRN    dextrose 10 % infusion 125-250 mL  125-250 mL IntraVENous PRN    prochlorperazine (COMPAZINE) with saline injection 5 mg  5 mg IntraVENous Q6H PRN    ondansetron (ZOFRAN) injection 8 mg  8 mg IntraVENous Q6H PRN    sodium chloride (NS) flush 5-10 mL  5-10 mL IntraVENous Q8H    sodium chloride (NS) flush 5-10 mL  5-10 mL IntraVENous PRN    naloxone (NARCAN) injection 0.4 mg  0.4 mg IntraVENous PRN    diphenhydrAMINE (BENADRYL) injection 12.5 mg  12.5 mg IntraVENous Q4H PRN          LAB AND IMAGING FINDINGS:     Lab Results   Component Value Date/Time    WBC 8.9 08/20/2017 04:47 AM    HGB 8.5 08/20/2017 04:47 AM    PLATELET 244 12/78/7320 04:47 AM     Lab Results   Component Value Date/Time    Sodium 138 08/20/2017 04:47 AM    Potassium 4.4 08/20/2017 04:47 AM    Chloride 105 08/20/2017 04:47 AM    CO2 25 08/20/2017 04:47 AM    BUN 22 08/20/2017 04:47 AM    Creatinine 0.67 08/20/2017 04:47 AM    Calcium 8.8 08/20/2017 04:47 AM    Magnesium 1.9 08/20/2017 04:47 AM    Phosphorus 4.6 08/20/2017 04:47 AM      Lab Results   Component Value Date/Time    AST (SGOT) 11 07/08/2017 04:42 AM    Alk. phosphatase 120 07/08/2017 04:42 AM    Protein, total 7.0 07/08/2017 04:42 AM    Albumin 1.5 07/08/2017 04:42 AM    Globulin 5.5 07/08/2017 04:42 AM     No results found for: INR, PTMR, PTP, PT1, PT2, APTT   No results found for: IRON, FE, TIBC, IBCT, PSAT, FERR   No results found for: PH, PCO2, PO2  No components found for: GLPOC   No results found for: CPK, CKMB             Total time: 35  Counseling / coordination time, spent as noted above: 25  > 50% counseling / coordination?:   yes  Prolonged service was provided for  []30 min   []75 min in face to face time in the presence of the patient, spent as noted above. Time Start:   Time End:   Note: this can only be billed with 05662 (initial) or 79593 (follow up). If multiple start / stop times, list each separately.

## 2017-08-21 NOTE — PROGRESS NOTES
ID Progress Note  2017    Subjective:     Problems with the wound vac    Objective:     Antibiotics:  1. None     Vitals:   Visit Vitals    /72 (BP 1 Location: Left arm, BP Patient Position: At rest)    Pulse (!) 109    Temp 98.3 °F (36.8 °C)    Resp 18    Ht 5' 4\" (1.626 m)    Wt 153.5 kg (338 lb 6.5 oz)    SpO2 98%    Breastfeeding No    BMI 58.09 kg/m2        Tmax:  Temp (24hrs), Av.1 °F (36.7 °C), Min:98 °F (36.7 °C), Max:98.3 °F (36.8 °C)      Exam:  Lungs:  clear to auscultation bilaterally  Heart:  regularly irregular rhythm  Abdomen:  abnormal findings:  tenderness moderate in the entire abdomen, hypoactive bowel sounds    Labs:      Recent Labs      17   0447   WBC  8.9   HGB  8.5*   PLT  500*   BUN  22*   CREA  0.67       Cultures:     Lab Results   Component Value Date/Time    Specimen Description: URINE 2009 07:45 PM     Lab Results   Component Value Date/Time    Culture result: NO GROWTH ON SOLID MEDIA AT 4 DAYS 2017 12:17 PM    Culture result:  2017 12:17 PM     **METHICILLIN RESISTANT STAPHYLOCOCCUS AUREUS**  ISOLATED FROM THIO BROTH ONLY      Culture result: NO GROWTH 5 DAYS 2017 02:35 PM       Radiology:     Line/Insert Date:           Assessment:     1. Ischemic gut with frozen abdomen  2. Leukocytosis--back up after surgery--trending back down overall--down to normal  3. Cultures negative to date    Objective:     1. Monitor off antibiotics  2.  Rehab soon      Layton Corley MD

## 2017-08-21 NOTE — PROGRESS NOTES
Bedside and Verbal shift change report given to Hamilton Mariano RN (oncoming nurse) by Hardik Israel RN (offgoing nurse). Report included the following information SBAR, Kardex, OR Summary, Procedure Summary, Intake/Output, MAR and Accordion.

## 2017-08-21 NOTE — PROGRESS NOTES
Physical Therapy  8.21.17    Chart reviewed. Note patient with plan for likely d/c to [facility] today. In to see patient for PT session, as d/c tentatively planned for later this PM. Patient requested to skip therapy today due to attempting to process d/c. Encouraged therapy but she requested to rest. F/u tomorrow pending d/c. Thank you.     sOman Terrazas, PT, DPT

## 2017-08-21 NOTE — PROGRESS NOTES
General Surgery Daily Progress Note    Admit Date: 2017  Post-Operative Day: 39 Days Post-Op from Procedure(s) with comments:  SPECIAL PROCEDURE OUTSIDE OF OR - central line placement     Subjective:     Last 24 hrs: Pt is doing ok. Wound care done. Using PCA a lot. Objective:     Blood pressure 124/72, pulse (!) 109, temperature 98.3 °F (36.8 °C), resp. rate 18, height 5' 4\" (1.626 m), weight 338 lb 6.5 oz (153.5 kg), SpO2 98 %, not currently breastfeeding. Temp (24hrs), Av.1 °F (36.7 °C), Min:98 °F (36.7 °C), Max:98.3 °F (36.8 °C)      _____________________  Physical Exam:     Alert and Oriented, x3, in no acute distress. Cardiovascular: tachy, no peripheral edema  Abdomen: wound w/ pink tissue; bowel lumen present; stool in wound but cleaned out for viewing of wound. Fred drain w/ no suture in skin      Assessment:   Active Problems:    Abdominal pain (2017)      Small bowel ischemia ([address] (2017)      Overview: CT abd/pelvis 17      1. The stomach and proximal small bowel loops are distended. There is a       loop of      small bowel in the midabdomen which is mildly dilated and does not       demonstrate      enhancement in the wall and there is suspicion of pneumatosis and this       leads to      a loop of small bowel which demonstrates thickening of the wall and       findings are      suspicious for ischemia. 2. There is extensive mesenteric edema and a small amount of ascites in       the      pelvis. 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 There is nonocclusive thrombus in the SMA.             Enterocutaneous fistula (2017)            Plan:     D/c fred drain - done  May d/c to [facility] today - on PCA  Palliative has made recs for pain mgmt  Wound care to provide wound care instructions    Data Review:    Recent Labs      17   0447   WBC  8.9   HGB  8.5*   HCT  27.4*   PLT  500*     Recent Labs      08/20/17   0447   NA  138   K  4.4   CL  105   CO2  25   GLU  90   BUN  22*   CREA  0.67   CA  8.8   MG  1.9   PHOS  4.6     No results for input(s): AML, LPSE in the last 72 hours.         ______________________  Medications:    Current Facility-Administered Medications   Medication Dose Route Frequency    morphine CR (MS CONTIN) tablet 45 mg  45 mg Oral Q8H    insulin lispro (HUMALOG) injection   SubCUTAneous BID    pantoprazole (PROTONIX) tablet 40 mg  40 mg Oral ACB    0.9% sodium chloride infusion 250 mL  250 mL IntraVENous PRN    acetaminophen (TYLENOL) tablet 650 mg  650 mg Oral Q4H PRN    LORazepam (ATIVAN) injection 0.76 mg  0.76 mg IntraVENous Q8H    clopidogrel (PLAVIX) tablet 75 mg  75 mg Oral DAILY    sodium chloride (NS) flush 10 mL  10 mL InterCATHeter Q24H    sodium chloride (NS) flush 10 mL  10 mL InterCATHeter PRN    sodium chloride (NS) flush 10-40 mL  10-40 mL InterCATHeter Q8H    alteplase (CATHFLO) 1 mg in sterile water (preservative free) 1 mL injection  1 mg InterCATHeter PRN    bacitracin 500 unit/gram packet 1 Packet  1 Packet Topical PRN    0.9% sodium chloride infusion 250 mL  250 mL IntraVENous PRN    lactated Ringers infusion  25 mL/hr IntraVENous CONTINUOUS    fat emulsion 20% (LIPOSYN, INTRALIPID) infusion 500 mL  500 mL IntraVENous Q MON, WED & FRI    HYDROmorphone (PF) (DILAUDID) injection 1 mg  1 mg IntraVENous Q1H PRN    aspirin chewable tablet 81 mg  81 mg Oral DAILY    morphine (PF)  mg/30 ml   IntraVENous CONTINUOUS    glucose chewable tablet 16 g  4 Tab Oral PRN    glucagon (GLUCAGEN) injection 1 mg  1 mg IntraMUSCular PRN    dextrose 10 % infusion 125-250 mL  125-250 mL IntraVENous PRN    prochlorperazine (COMPAZINE) with saline injection 5 mg  5 mg IntraVENous Q6H PRN    ondansetron (ZOFRAN) injection 8 mg  8 mg IntraVENous Q6H PRN    sodium chloride (NS) flush 5-10 mL  5-10 mL IntraVENous Q8H    sodium chloride (NS) flush 5-10 mL 5-10 mL IntraVENous PRN    naloxone (NARCAN) injection 0.4 mg  0.4 mg IntraVENous PRN    diphenhydrAMINE (BENADRYL) injection 12.5 mg  12.5 mg IntraVENous Q4H PRN       Gianni Rosen NP  8/21/2017                    Agree with NP assessment and plan. Discharge delayed given [address] [facility] morphine PCA limitations; hopefully discharge can be completed tomorrow.

## 2017-08-21 NOTE — PROGRESS NOTES
Care Management Interventions  PCP Verified by CM: Yes (Dr. Pollo Flores)  Mode of Transport at Discharge: Other (see comment) (family)  Transition of Care Consult (CM Consult): Discharge Planning  MyChart Signup: No  Discharge Durable Medical Equipment: No  Physical Therapy Consult: Yes  Occupational Therapy Consult: Yes  Speech Therapy Consult: No  Current Support Network: Lives with Caregiver  Confirm Follow Up Transport: Family  Plan discussed with Pt/Family/Caregiver: Yes  Freedom of Choice Offered: Yes  Discharge Location  Discharge Placement: [address] [facility] ([place])    CM reviewed chart and received discharge orders. [facility] does have a bed available today, and can accept pt today. CM faxed AVS, prescriptions, wound care note, and Palliative consult to [facility]. Envelope containing MARs, kardex, AVS, prescriptions, wound care note, palliative consult, and emtalas will be sent with pt. Nurse will call report to [phone]. Pt and pt's mother are in agreement with discharge plan. Transport is scheduled for 3pm with [facility]. MOUNIKA Kahn, ACM    Discharge canceled for today, [address] [facility] does not have enough Morphine for pt's PCA at this time, and has had to order more. Plan for discharge tomorrow. [facility] is calling pt and family to explain, and CM canceled transport.     MOUNIKA Kahn, ACJUAN FRANCISCO

## 2017-08-22 LAB
ANION GAP SERPL CALC-SCNC: 6 MMOL/L (ref 5–15)
BASOPHILS # BLD: 0 K/UL (ref 0–0.1)
BASOPHILS NFR BLD: 0 % (ref 0–1)
BUN SERPL-MCNC: 16 MG/DL (ref 6–20)
BUN/CREAT SERPL: 24 (ref 12–20)
CALCIUM SERPL-MCNC: 8.7 MG/DL (ref 8.5–10.1)
CHLORIDE SERPL-SCNC: 104 MMOL/L (ref 97–108)
CO2 SERPL-SCNC: 27 MMOL/L (ref 21–32)
CREAT SERPL-MCNC: 0.68 MG/DL (ref 0.55–1.02)
EOSINOPHIL # BLD: 0.3 K/UL (ref 0–0.4)
EOSINOPHIL NFR BLD: 3 % (ref 0–7)
ERYTHROCYTE [DISTWIDTH] IN BLOOD BY AUTOMATED COUNT: 15.2 % (ref 11.5–14.5)
GLUCOSE BLD STRIP.AUTO-MCNC: 116 MG/DL (ref 65–100)
GLUCOSE BLD STRIP.AUTO-MCNC: 135 MG/DL (ref 65–100)
GLUCOSE SERPL-MCNC: 108 MG/DL (ref 65–100)
HCT VFR BLD AUTO: 24.1 % (ref 35–47)
HGB BLD-MCNC: 7.6 G/DL (ref 11.5–16)
LYMPHOCYTES # BLD: 2.5 K/UL (ref 0.8–3.5)
LYMPHOCYTES NFR BLD: 31 % (ref 12–49)
MAGNESIUM SERPL-MCNC: 1.7 MG/DL (ref 1.6–2.4)
MCH RBC QN AUTO: 27.8 PG (ref 26–34)
MCHC RBC AUTO-ENTMCNC: 31.5 G/DL (ref 30–36.5)
MCV RBC AUTO: 88.3 FL (ref 80–99)
MONOCYTES # BLD: 0.6 K/UL (ref 0–1)
MONOCYTES NFR BLD: 8 % (ref 5–13)
NEUTS SEG # BLD: 4.7 K/UL (ref 1.8–8)
NEUTS SEG NFR BLD: 58 % (ref 32–75)
PHOSPHATE SERPL-MCNC: 5 MG/DL (ref 2.6–4.7)
PLATELET # BLD AUTO: 448 K/UL (ref 150–400)
POTASSIUM SERPL-SCNC: 3.6 MMOL/L (ref 3.5–5.1)
RBC # BLD AUTO: 2.73 M/UL (ref 3.8–5.2)
SERVICE CMNT-IMP: ABNORMAL
SERVICE CMNT-IMP: ABNORMAL
SODIUM SERPL-SCNC: 137 MMOL/L (ref 136–145)
WBC # BLD AUTO: 8.1 K/UL (ref 3.6–11)

## 2017-08-22 PROCEDURE — 74011250636 HC RX REV CODE- 250/636: Performed by: INTERNAL MEDICINE

## 2017-08-22 PROCEDURE — 82962 GLUCOSE BLOOD TEST: CPT

## 2017-08-22 PROCEDURE — 65210000002 HC RM PRIVATE GYN

## 2017-08-22 PROCEDURE — 74011000258 HC RX REV CODE- 258: Performed by: SURGERY

## 2017-08-22 PROCEDURE — 80048 BASIC METABOLIC PNL TOTAL CA: CPT | Performed by: SURGERY

## 2017-08-22 PROCEDURE — 74011250637 HC RX REV CODE- 250/637: Performed by: SURGERY

## 2017-08-22 PROCEDURE — 74011250636 HC RX REV CODE- 250/636: Performed by: EMERGENCY MEDICINE

## 2017-08-22 PROCEDURE — 85025 COMPLETE CBC W/AUTO DIFF WBC: CPT | Performed by: SURGERY

## 2017-08-22 PROCEDURE — 84100 ASSAY OF PHOSPHORUS: CPT | Performed by: SURGERY

## 2017-08-22 PROCEDURE — 36415 COLL VENOUS BLD VENIPUNCTURE: CPT | Performed by: SURGERY

## 2017-08-22 PROCEDURE — 74011000250 HC RX REV CODE- 250: Performed by: SURGERY

## 2017-08-22 PROCEDURE — 74011000250 HC RX REV CODE- 250: Performed by: INTERNAL MEDICINE

## 2017-08-22 PROCEDURE — 83735 ASSAY OF MAGNESIUM: CPT | Performed by: SURGERY

## 2017-08-22 PROCEDURE — 74011250636 HC RX REV CODE- 250/636: Performed by: SURGERY

## 2017-08-22 PROCEDURE — 74011250637 HC RX REV CODE- 250/637: Performed by: INTERNAL MEDICINE

## 2017-08-22 PROCEDURE — 74011636637 HC RX REV CODE- 636/637: Performed by: SURGERY

## 2017-08-22 RX ORDER — DEXTROSE 50 % IN WATER (D50W) INTRAVENOUS SYRINGE
12.5-25 AS NEEDED
Status: DISCONTINUED | OUTPATIENT
Start: 2017-08-22 | End: 2017-08-24 | Stop reason: HOSPADM

## 2017-08-22 RX ORDER — MORPHINE SULFATE 60 MG/1
60 TABLET, FILM COATED, EXTENDED RELEASE ORAL EVERY 8 HOURS
Status: DISCONTINUED | OUTPATIENT
Start: 2017-08-22 | End: 2017-08-24 | Stop reason: HOSPADM

## 2017-08-22 RX ADMIN — Medication 10 ML: at 07:11

## 2017-08-22 RX ADMIN — Medication: at 02:39

## 2017-08-22 RX ADMIN — Medication 10 ML: at 22:26

## 2017-08-22 RX ADMIN — HYDROMORPHONE HYDROCHLORIDE 1 MG: 1 INJECTION, SOLUTION INTRAMUSCULAR; INTRAVENOUS; SUBCUTANEOUS at 18:17

## 2017-08-22 RX ADMIN — THIAMINE HYDROCHLORIDE: 100 INJECTION, SOLUTION INTRAMUSCULAR; INTRAVENOUS at 18:23

## 2017-08-22 RX ADMIN — ASPIRIN 81 MG CHEWABLE TABLET 81 MG: 81 TABLET CHEWABLE at 10:19

## 2017-08-22 RX ADMIN — HYDROMORPHONE HYDROCHLORIDE 1 MG: 1 INJECTION, SOLUTION INTRAMUSCULAR; INTRAVENOUS; SUBCUTANEOUS at 02:51

## 2017-08-22 RX ADMIN — Medication 0.76 MG: at 07:11

## 2017-08-22 RX ADMIN — Medication 10 ML: at 07:12

## 2017-08-22 RX ADMIN — MORPHINE SULFATE 60 MG: 60 TABLET, FILM COATED, EXTENDED RELEASE ORAL at 14:09

## 2017-08-22 RX ADMIN — PANTOPRAZOLE SODIUM 40 MG: 40 TABLET, DELAYED RELEASE ORAL at 07:11

## 2017-08-22 RX ADMIN — Medication 10 ML: at 14:13

## 2017-08-22 RX ADMIN — Medication 10 ML: at 14:12

## 2017-08-22 RX ADMIN — SODIUM CHLORIDE, SODIUM LACTATE, POTASSIUM CHLORIDE, AND CALCIUM CHLORIDE 25 ML/HR: 600; 310; 30; 20 INJECTION, SOLUTION INTRAVENOUS at 07:04

## 2017-08-22 RX ADMIN — HYDROMORPHONE HYDROCHLORIDE 1 MG: 1 INJECTION, SOLUTION INTRAMUSCULAR; INTRAVENOUS; SUBCUTANEOUS at 16:17

## 2017-08-22 RX ADMIN — CLOPIDOGREL BISULFATE 75 MG: 75 TABLET ORAL at 10:19

## 2017-08-22 RX ADMIN — Medication: at 16:15

## 2017-08-22 RX ADMIN — Medication 0.76 MG: at 22:16

## 2017-08-22 RX ADMIN — HYDROMORPHONE HYDROCHLORIDE 1 MG: 1 INJECTION, SOLUTION INTRAMUSCULAR; INTRAVENOUS; SUBCUTANEOUS at 23:21

## 2017-08-22 RX ADMIN — MORPHINE SULFATE 45 MG: 15 TABLET, FILM COATED, EXTENDED RELEASE ORAL at 07:11

## 2017-08-22 RX ADMIN — HYDROMORPHONE HYDROCHLORIDE 1 MG: 1 INJECTION, SOLUTION INTRAMUSCULAR; INTRAVENOUS; SUBCUTANEOUS at 21:07

## 2017-08-22 RX ADMIN — MORPHINE SULFATE 60 MG: 60 TABLET, FILM COATED, EXTENDED RELEASE ORAL at 22:15

## 2017-08-22 RX ADMIN — Medication 10 ML: at 18:31

## 2017-08-22 RX ADMIN — SODIUM CHLORIDE 5 MG: 9 INJECTION INTRAMUSCULAR; INTRAVENOUS; SUBCUTANEOUS at 10:19

## 2017-08-22 RX ADMIN — Medication 0.76 MG: at 15:41

## 2017-08-22 NOTE — PROGRESS NOTES
CM reviewed chart and spoke with [facility], who confirmed that they will accept pt and they can accommodate PCA pumps, however they can not accommodate the amount of morphine pt is currently getting through her PCA Pump (pt needs to be getting less than 300mg of morphine through PCA pump per day). Palliative Care is working on pt's pain control regimen. Attending informed. CM will continue to follow.   Moisés Delcid, BSW, ACM

## 2017-08-22 NOTE — PROGRESS NOTES
Bedside shift change report given to Nicci Isaac (oncoming nurse) by Nina Barroso (offgoing nurse). Report included the following information SBAR.

## 2017-08-22 NOTE — PROGRESS NOTES
General Surgery Daily Progress Note    Admit Date: 2017  Post-Operative Day: 40 Days Post-Op from Procedure(s) with comments:  SPECIAL PROCEDURE OUTSIDE OF OR - central line placement     Subjective:     Last 24 hrs: Patient awaiting discharge to [facility] later this morning. Discharge delayed yesterday because of insufficient supply of Morphine at [facility]. Tearful and nervous about leaving. Reassured her and provided comfort. Objective:     Blood pressure 128/79, pulse (!) 107, temperature 98.4 °F (36.9 °C), resp. rate 16, height 5' 4\" (1.626 m), weight 339 lb 1.1 oz (153.8 kg), SpO2 96 %, not currently breastfeeding. Temp (24hrs), Av.6 °F (37 °C), Min:98.3 °F (36.8 °C), Max:99.1 °F (37.3 °C)      _____________________  Physical Exam:     Alert and Oriented, tearful, in no acute distress. Cardiovascular: RRR  Lungs: no resp distress  Abdomen: large midabdominal would with appliance and bag with light tan, soft & watery contents. Red rubber drain on left side without surrounding erythema. Assessment:   Active Problems:    Abdominal pain (2017)      Small bowel ischemia ([address] (2017)      Overview: CT abd/pelvis 17      1. The stomach and proximal small bowel loops are distended. There is a       loop of      small bowel in the midabdomen which is mildly dilated and does not       demonstrate      enhancement in the wall and there is suspicion of pneumatosis and this       leads to      a loop of small bowel which demonstrates thickening of the wall and       findings are      suspicious for ischemia. 2. There is extensive mesenteric edema and a small amount of ascites in       the      pelvis. 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 There is nonocclusive thrombus in the SMA. Enterocutaneous fistula (2017)        Plan:     Discharge to [facility] with PCA, TPN.   Continue wound care as prescribed. Data Review:    Recent Labs      08/22/17   0504  08/20/17   0447   WBC  8.1  8.9   HGB  7.6*  8.5*   HCT  24.1*  27.4*   PLT  448*  500*     Recent Labs      08/22/17   0504  08/20/17 0447   NA  137  138   K  3.6  4.4   CL  104  105   CO2  27  25   GLU  108*  90   BUN  16  22*   CREA  0.68  0.67   CA  8.7  8.8   MG  1.7  1.9   PHOS  5.0*  4.6     No results for input(s): AML, LPSE in the last 72 hours.         ______________________  Medications:    Current Facility-Administered Medications   Medication Dose Route Frequency    dextrose (D50W) injection syrg 12.5-25 g  12.5-25 g IntraVENous PRN    morphine CR (MS CONTIN) tablet 45 mg  45 mg Oral Q8H    TPN ADULT - CENTRAL AA 5% D20% W/ ELECTROLYTES AND CA   IntraVENous CONTINUOUS    insulin lispro (HUMALOG) injection   SubCUTAneous BID    pantoprazole (PROTONIX) tablet 40 mg  40 mg Oral ACB    0.9% sodium chloride infusion 250 mL  250 mL IntraVENous PRN    acetaminophen (TYLENOL) tablet 650 mg  650 mg Oral Q4H PRN    LORazepam (ATIVAN) injection 0.76 mg  0.76 mg IntraVENous Q8H    clopidogrel (PLAVIX) tablet 75 mg  75 mg Oral DAILY    sodium chloride (NS) flush 10 mL  10 mL InterCATHeter Q24H    sodium chloride (NS) flush 10 mL  10 mL InterCATHeter PRN    sodium chloride (NS) flush 10-40 mL  10-40 mL InterCATHeter Q8H    alteplase (CATHFLO) 1 mg in sterile water (preservative free) 1 mL injection  1 mg InterCATHeter PRN    bacitracin 500 unit/gram packet 1 Packet  1 Packet Topical PRN    0.9% sodium chloride infusion 250 mL  250 mL IntraVENous PRN    lactated Ringers infusion  25 mL/hr IntraVENous CONTINUOUS    fat emulsion 20% (LIPOSYN, INTRALIPID) infusion 500 mL  500 mL IntraVENous Q MON, WED & FRI    HYDROmorphone (PF) (DILAUDID) injection 1 mg  1 mg IntraVENous Q1H PRN    aspirin chewable tablet 81 mg  81 mg Oral DAILY    morphine (PF)  mg/30 ml   IntraVENous CONTINUOUS    glucose chewable tablet 16 g  4 Tab Oral PRN    glucagon (GLUCAGEN) injection 1 mg  1 mg IntraMUSCular PRN    prochlorperazine (COMPAZINE) with saline injection 5 mg  5 mg IntraVENous Q6H PRN    ondansetron (ZOFRAN) injection 8 mg  8 mg IntraVENous Q6H PRN    sodium chloride (NS) flush 5-10 mL  5-10 mL IntraVENous Q8H    sodium chloride (NS) flush 5-10 mL  5-10 mL IntraVENous PRN    naloxone (NARCAN) injection 0.4 mg  0.4 mg IntraVENous PRN    diphenhydrAMINE (BENADRYL) injection 12.5 mg  12.5 mg IntraVENous Q4H PRN       Stuart Rangel PA-C  8/22/2017    Pt seen and examined  Unclear if she will be leaving due to pain medicine requirement  Eakens pouch in place and seems to be working  Continue dressing changes   Continue TPN  Await  Rehab acceptance.

## 2017-08-22 NOTE — PROGRESS NOTES
Spoke with nsg. Pt was due to be discharged today, but dispo facility having issues with medication for her PCA so discharge deferred. Attempted to see pt this pm, however pt is declining due to fatigue, pain and being upset over discharge cancellation. Will follow up tomorrow,.

## 2017-08-22 NOTE — CONSULTS
Palliative Medicine Consult  Joe: [phone])    Patient Name: Patricia You  YOB: 1975    Date of Initial Consult: 6/27/17  Reason for Consult: overwhelming symptoms  Requesting Provider: Silvia Perez NP  Primary Care Physician: Javier Huber MD      SUMMARY:   Patricia You is a 46 y.o. with a past history of Crohn's disease,anxiety,  chronic pain, hx DVT, multiple (4) small bowel resections, s/p recent urgent dx lap, with lysis of adhesions, repair of suspected perf 6/7/17 , who was admitted on 6/25/2017 from home with a diagnosis of worsening abdominal pain, elevated WBC and MRSA wound infection. Current medical issues leading to Palliative Medicine involvement include: Crohn's flare (seen on enteroscopy), abdominal pain, MRSA wound infection, post op wound pain, nausea. .    Patient is followed chronically for pain management by Dr. Macario Jenkins at [facility]. Home regimen for chronic abdominal pain is MS Contin 60 Q8 and MS IR 30 Q6 PRN. She also takes Xanax 1mg TID prn anxiety    6/7/17 exp lap  6/28/17 exp lap  7/7/17 exp lap, lysis of adhesions, fistula excl     PALLIATIVE DIAGNOSES:   1. Acute on chronic abdominal pain. Multiple abdominal surgeries  2. Nausea  3. Crohn's disease (currently off meds due to acute infection/surgery)  4. MRSA wound   5. Anxiety about health  6. Homesickness  7. Acute grief, change in health. PLAN:   1. Pain - working on transition to pain regimen that can be managed at [facility].  (Morphine IV use needs to be less than 300mg/24hrs)   Patient used 150mg in past 12hrs. Previous 150mg bag was used in 8 hours before that. 1. Continue Dilaudid 1mg IV Q3hr PRN pain. Patient may need this more often with transition to lower PCA  2. Increase MS Contin to 60mg PO Q8 hrs. (Previous home regimen was 60mg Q8). 3. Continue Morphine PCA  1. Decrease basal from 1mg/hr to zero. 2. Continue 5mg IV -- new interval Q15 min PCA  4.  Patient to be seen again tomorrow by our team, will again assess IV morphine use by PCA and update [facility]. 2. Discussed with bedside nurse, case management, Zunilda Roe and admissions director. Nanette Reece [phone])     GOALS OF CARE / TREATMENT PREFERENCES:   [====Goals of Care====]  GOALS OF CARE:  Patient / health care proxy stated goals: pain control  Recovery from acute issues      TREATMENT PREFERENCES:   Code Status: Full Code    Advance Care Planning:  Advance Care Planning 6/26/2017   Patient's Healthcare Decision Maker is: Legal Next of Camilo 71   Primary Decision Maker Name Alex Goyal   Primary Decision Maker Phone Number [phone]   Primary Decision Maker Relationship to Patient Parent   Confirm Advance Directive None   Patient Would Like to Complete Advance Directive No       Other:    The palliative care team has discussed with patient / health care proxy about goals of care / treatment preferences for patient.  [====Goals of Care====]         HISTORY:     History obtained from: chart, patient    CHIEF COMPLAINT: admitted with abdominal pain    HPI/SUBJECTIVE:    The patient is:   [] Verbal and participatory  [] Non-participatory due to:   46year old   Recent abdominal surgery 6/7/17   Went home 6/21  Returned 6/25 with acute increase in abdominal pain  Which feels like her crohn's flares. Wound is not really as painful as rest of abdomen. It was 10/10 at home, really unbearable. Also nauseated. Dilaudid helping here, but doesn't last long. Pain at best 5/10 right now 8/10  Also nauseated all the time  Compazine helps the best, zofran doesn't work much but a little    Feeling more thirsty than usual     Has chronic pain from crohns, well managed on current morphine doses at home. Allows her to function. She follows at [facility] with Dr. Paige Nunez.      Clinical Pain Assessment (nonverbal scale for severity on nonverbal patients):   [++++ Clinical Pain Assessment++++]  [++++Pain Severity++++]: Pain: 7  [++++Pain Character++++]: sharp and burning, stabbing  [++++Pain Duration++++]: several days  [++++Pain Effect++++]: hard to walk, feeling anxious  [++++Pain Factors++++]: better after pain medicaion  [++++Pain Frequency++++]: constant  [++++Pain Location++++]: across abdomen both sides in middle  [++++ Clinical Pain Assessment++++]     FUNCTIONAL ASSESSMENT:     Palliative Performance Scale (PPS):  PPS: 40       PSYCHOSOCIAL/SPIRITUAL SCREENING:     Advance Care Planning:  Advance Care Planning 6/26/2017   Patient's Healthcare Decision Maker is: Legal Next of Camilo 71   Primary Decision Maker Name Abimael Arguello   Primary Decision Maker Phone Number [phone]   Primary Decision Maker Relationship to Patient Parent   Confirm Advance Directive None   Patient Would Like to Complete Advance Directive No        Any spiritual / Jewish concerns:  [] Yes /  [x] No    Caregiver Burnout:  [] Yes /  [x] No /  [] No Caregiver Present      Anticipatory grief assessment:   [x] Normal  / [] Maladaptive       ESAS Anxiety: Anxiety: 3    ESAS Depression: Depression: 2        REVIEW OF SYSTEMS:     Positive and pertinent negative findings in ROS are noted above in HPI. The following systems were [x] reviewed / [] unable to be reviewed as noted in HPI  Other findings are noted below. Systems: constitutional, ears/nose/mouth/throat, respiratory, gastrointestinal, genitourinary, musculoskeletal, integumentary, neurologic, psychiatric, endocrine. Positive findings noted below. Modified ESAS Completed by: provider   Fatigue: 4 Drowsiness: 0   Depression: 2 Pain: 7   Anxiety: 3 Nausea: 5   Anorexia: 0 Dyspnea: 0     Constipation: No              PHYSICAL EXAM:     From RN flowsheet:  Wt Readings from Last 3 Encounters:   08/22/17 339 lb 1.1 oz (153.8 kg)   06/25/17 343 lb (155.6 kg)   06/22/17 343 lb (155.6 kg)     Blood pressure 115/76, pulse 100, temperature 98.2 °F (36.8 °C), resp.  rate 18, height 5' 4\" (1.626 m), weight 339 lb 1.1 oz (153.8 kg), SpO2 96 %, not currently breastfeeding. Pain Scale 1: Numeric (0 - 10)  Pain Intensity 1: 0  Pain Onset 1:  (Chronic)  Pain Location 1: Abdomen  Pain Orientation 1: Mid  Pain Description 1: Constant  Pain Intervention(s) 1: Medication (see MAR)  Last bowel movement, if known:     Constitutional: WDWN female NAD  Eyes: pupils equal, anicteric  ENMT: no nasal discharge, moist mucous membranes  Cardiovascular: regular rhythm, distal pulses intact  Respiratory: breathing not labored, symmetric  Gastrointestinal: midline incision healing, no exudate +ostomy w stool  Musculoskeletal: no deformity, no tenderness to palpation  Skin: warm, dry  No edema  Neurologic: following commands, moving all extremities  Able to ambulate  Psychiatric: full affect, anxious/almost tearful at times  Other:       HISTORY:     Active Problems:    Abdominal pain (6/25/2017)      Small bowel ischemia (HCC) (6/28/2017)      Overview: CT abd/pelvis 6/28/17      1. The stomach and proximal small bowel loops are distended. There is a       loop of      small bowel in the midabdomen which is mildly dilated and does not       demonstrate      enhancement in the wall and there is suspicion of pneumatosis and this       leads to      a loop of small bowel which demonstrates thickening of the wall and       findings are      suspicious for ischemia. 2. There is extensive mesenteric edema and a small amount of ascites in       the      pelvis. 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 There is nonocclusive thrombus in the SMA.             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)       Past Surgical History:   Procedure Laterality Date  HX OTHER SURGICAL      multiple procedures related to her Crohn's disease    [place] LAP, INCISIONAL HERNIA REPAIR,INCARCERATED  9-10-08    dr. Dino Jade History   Problem Relation Age of Onset    Hypertension Father     Stroke Father     Other Father      arthritis      History reviewed, no pertinent family history.   Social History   Substance Use Topics    Smoking status: Former Smoker    Smokeless tobacco: Not on file    Alcohol use No     Allergies   Allergen Reactions    Demerol [Meperidine] Unknown (comments)    Soma [Carisoprodol] Rash and Nausea Only    Sulfa (Sulfonamide Antibiotics) Unknown (comments)    Toradol [Ketorolac Tromethamine] Unknown (comments)      Current Facility-Administered Medications   Medication Dose Route Frequency    dextrose (D50W) injection syrg 12.5-25 g  12.5-25 g IntraVENous PRN    TPN ADULT - CENTRAL AA 5% D20% W/ ELECTROLYTES AND CA   IntraVENous CONTINUOUS    morphine CR (MS CONTIN) tablet 60 mg  60 mg Oral Q8H    TPN ADULT - CENTRAL AA 5% D20% W/ ELECTROLYTES AND CA   IntraVENous CONTINUOUS    insulin lispro (HUMALOG) injection   SubCUTAneous BID    pantoprazole (PROTONIX) tablet 40 mg  40 mg Oral ACB    0.9% sodium chloride infusion 250 mL  250 mL IntraVENous PRN    acetaminophen (TYLENOL) tablet 650 mg  650 mg Oral Q4H PRN    LORazepam (ATIVAN) injection 0.76 mg  0.76 mg IntraVENous Q8H    clopidogrel (PLAVIX) tablet 75 mg  75 mg Oral DAILY    sodium chloride (NS) flush 10 mL  10 mL InterCATHeter Q24H    sodium chloride (NS) flush 10 mL  10 mL InterCATHeter PRN    sodium chloride (NS) flush 10-40 mL  10-40 mL InterCATHeter Q8H    alteplase (CATHFLO) 1 mg in sterile water (preservative free) 1 mL injection  1 mg InterCATHeter PRN    bacitracin 500 unit/gram packet 1 Packet  1 Packet Topical PRN    0.9% sodium chloride infusion 250 mL  250 mL IntraVENous PRN    lactated Ringers infusion  25 mL/hr IntraVENous CONTINUOUS    fat emulsion 20% (LIPOSYN, INTRALIPID) infusion 500 mL  500 mL IntraVENous Q MON, WED & FRI    HYDROmorphone (PF) (DILAUDID) injection 1 mg  1 mg IntraVENous Q1H PRN    aspirin chewable tablet 81 mg  81 mg Oral DAILY    morphine (PF)  mg/30 ml   IntraVENous CONTINUOUS    glucose chewable tablet 16 g  4 Tab Oral PRN    glucagon (GLUCAGEN) injection 1 mg  1 mg IntraMUSCular PRN    prochlorperazine (COMPAZINE) with saline injection 5 mg  5 mg IntraVENous Q6H PRN    ondansetron (ZOFRAN) injection 8 mg  8 mg IntraVENous Q6H PRN    sodium chloride (NS) flush 5-10 mL  5-10 mL IntraVENous Q8H    sodium chloride (NS) flush 5-10 mL  5-10 mL IntraVENous PRN    naloxone (NARCAN) injection 0.4 mg  0.4 mg IntraVENous PRN    diphenhydrAMINE (BENADRYL) injection 12.5 mg  12.5 mg IntraVENous Q4H PRN          LAB AND IMAGING FINDINGS:     Lab Results   Component Value Date/Time    WBC 8.1 08/22/2017 05:04 AM    HGB 7.6 08/22/2017 05:04 AM    PLATELET 393 60/04/8050 05:04 AM     Lab Results   Component Value Date/Time    Sodium 137 08/22/2017 05:04 AM    Potassium 3.6 08/22/2017 05:04 AM    Chloride 104 08/22/2017 05:04 AM    CO2 27 08/22/2017 05:04 AM    BUN 16 08/22/2017 05:04 AM    Creatinine 0.68 08/22/2017 05:04 AM    Calcium 8.7 08/22/2017 05:04 AM    Magnesium 1.7 08/22/2017 05:04 AM    Phosphorus 5.0 08/22/2017 05:04 AM      Lab Results   Component Value Date/Time    AST (SGOT) 11 07/08/2017 04:42 AM    Alk.  phosphatase 120 07/08/2017 04:42 AM    Protein, total 7.0 07/08/2017 04:42 AM    Albumin 1.5 07/08/2017 04:42 AM    Globulin 5.5 07/08/2017 04:42 AM     No results found for: INR, PTMR, PTP, PT1, PT2, APTT   No results found for: IRON, FE, TIBC, IBCT, PSAT, FERR   No results found for: PH, PCO2, PO2  No components found for: GLPOC   No results found for: CPK, CKMB             Total time: 35  Counseling / coordination time, spent as noted above: 25  > 50% counseling / coordination?:   yes  Prolonged service was provided for  []30 min   []75 min in face to face time in the presence of the patient, spent as noted above. Time Start:   Time End:   Note: this can only be billed with 44396 (initial) or 07950 (follow up). If multiple start / stop times, list each separately.

## 2017-08-22 NOTE — PROGRESS NOTES
Bedside shift change report given to Avi Strong RN (oncoming nurse) by Santana Clifford RN (offgoing nurse). Report included the following information SBAR, Kardex and MAR.

## 2017-08-23 LAB
GLUCOSE BLD STRIP.AUTO-MCNC: 105 MG/DL (ref 65–100)
GLUCOSE BLD STRIP.AUTO-MCNC: 112 MG/DL (ref 65–100)
SERVICE CMNT-IMP: ABNORMAL
SERVICE CMNT-IMP: ABNORMAL

## 2017-08-23 PROCEDURE — 82962 GLUCOSE BLOOD TEST: CPT

## 2017-08-23 PROCEDURE — 74011000250 HC RX REV CODE- 250: Performed by: NURSE PRACTITIONER

## 2017-08-23 PROCEDURE — 74011000250 HC RX REV CODE- 250: Performed by: SURGERY

## 2017-08-23 PROCEDURE — 74011250636 HC RX REV CODE- 250/636: Performed by: INTERNAL MEDICINE

## 2017-08-23 PROCEDURE — 74011250637 HC RX REV CODE- 250/637: Performed by: SURGERY

## 2017-08-23 PROCEDURE — 74011250636 HC RX REV CODE- 250/636: Performed by: EMERGENCY MEDICINE

## 2017-08-23 PROCEDURE — 74011250636 HC RX REV CODE- 250/636: Performed by: SURGERY

## 2017-08-23 PROCEDURE — 65210000002 HC RM PRIVATE GYN

## 2017-08-23 PROCEDURE — 74011250636 HC RX REV CODE- 250/636: Performed by: PHYSICAL MEDICINE & REHABILITATION

## 2017-08-23 PROCEDURE — 74011000258 HC RX REV CODE- 258: Performed by: SURGERY

## 2017-08-23 PROCEDURE — 74011250637 HC RX REV CODE- 250/637: Performed by: INTERNAL MEDICINE

## 2017-08-23 PROCEDURE — 74011636637 HC RX REV CODE- 636/637: Performed by: SURGERY

## 2017-08-23 PROCEDURE — 74011250636 HC RX REV CODE- 250/636: Performed by: NURSE PRACTITIONER

## 2017-08-23 RX ORDER — MORPHINE SULFATE 60 MG/1
60 TABLET, FILM COATED, EXTENDED RELEASE ORAL 3 TIMES DAILY
Qty: 90 TAB | Refills: 0 | Status: SHIPPED | OUTPATIENT
Start: 2017-08-23 | End: 2017-10-27

## 2017-08-23 RX ORDER — HYDROMORPHONE HYDROCHLORIDE 1 MG/ML
1 INJECTION, SOLUTION INTRAMUSCULAR; INTRAVENOUS; SUBCUTANEOUS
Qty: 20 ML | Refills: 0 | Status: ON HOLD | OUTPATIENT
Start: 2017-08-23 | End: 2017-10-17

## 2017-08-23 RX ADMIN — I.V. FAT EMULSION 500 ML: 20 EMULSION INTRAVENOUS at 21:00

## 2017-08-23 RX ADMIN — ASPIRIN 81 MG CHEWABLE TABLET 81 MG: 81 TABLET CHEWABLE at 09:12

## 2017-08-23 RX ADMIN — HYDROMORPHONE HYDROCHLORIDE 1 MG: 1 INJECTION, SOLUTION INTRAMUSCULAR; INTRAVENOUS; SUBCUTANEOUS at 09:16

## 2017-08-23 RX ADMIN — ONDANSETRON 8 MG: 2 INJECTION INTRAMUSCULAR; INTRAVENOUS at 06:56

## 2017-08-23 RX ADMIN — CLOPIDOGREL BISULFATE 75 MG: 75 TABLET ORAL at 09:12

## 2017-08-23 RX ADMIN — HYDROMORPHONE HYDROCHLORIDE 1 MG: 1 INJECTION, SOLUTION INTRAMUSCULAR; INTRAVENOUS; SUBCUTANEOUS at 18:33

## 2017-08-23 RX ADMIN — Medication 10 ML: at 06:16

## 2017-08-23 RX ADMIN — HYDROMORPHONE HYDROCHLORIDE 1 MG: 1 INJECTION, SOLUTION INTRAMUSCULAR; INTRAVENOUS; SUBCUTANEOUS at 01:07

## 2017-08-23 RX ADMIN — Medication 10 ML: at 14:46

## 2017-08-23 RX ADMIN — Medication 0.76 MG: at 06:14

## 2017-08-23 RX ADMIN — Medication: at 01:03

## 2017-08-23 RX ADMIN — ONDANSETRON 8 MG: 2 INJECTION INTRAMUSCULAR; INTRAVENOUS at 23:30

## 2017-08-23 RX ADMIN — Medication 0.76 MG: at 14:43

## 2017-08-23 RX ADMIN — HYDROMORPHONE HYDROCHLORIDE 1 MG: 1 INJECTION, SOLUTION INTRAMUSCULAR; INTRAVENOUS; SUBCUTANEOUS at 07:00

## 2017-08-23 RX ADMIN — HYDROMORPHONE HYDROCHLORIDE 1 MG: 1 INJECTION, SOLUTION INTRAMUSCULAR; INTRAVENOUS; SUBCUTANEOUS at 21:28

## 2017-08-23 RX ADMIN — HYDROMORPHONE HYDROCHLORIDE 1 MG: 1 INJECTION, SOLUTION INTRAMUSCULAR; INTRAVENOUS; SUBCUTANEOUS at 03:20

## 2017-08-23 RX ADMIN — Medication 10 ML: at 14:45

## 2017-08-23 RX ADMIN — Medication 10 ML: at 23:31

## 2017-08-23 RX ADMIN — HYDROMORPHONE HYDROCHLORIDE 1 MG: 1 INJECTION, SOLUTION INTRAMUSCULAR; INTRAVENOUS; SUBCUTANEOUS at 23:30

## 2017-08-23 RX ADMIN — MORPHINE SULFATE 60 MG: 60 TABLET, FILM COATED, EXTENDED RELEASE ORAL at 23:29

## 2017-08-23 RX ADMIN — THIAMINE HYDROCHLORIDE: 100 INJECTION, SOLUTION INTRAMUSCULAR; INTRAVENOUS at 18:59

## 2017-08-23 RX ADMIN — MORPHINE SULFATE 60 MG: 60 TABLET, FILM COATED, EXTENDED RELEASE ORAL at 06:14

## 2017-08-23 RX ADMIN — SODIUM CHLORIDE, SODIUM LACTATE, POTASSIUM CHLORIDE, AND CALCIUM CHLORIDE 25 ML/HR: 600; 310; 30; 20 INJECTION, SOLUTION INTRAVENOUS at 21:00

## 2017-08-23 RX ADMIN — MORPHINE SULFATE 60 MG: 60 TABLET, FILM COATED, EXTENDED RELEASE ORAL at 14:43

## 2017-08-23 RX ADMIN — PANTOPRAZOLE SODIUM 40 MG: 40 TABLET, DELAYED RELEASE ORAL at 07:03

## 2017-08-23 RX ADMIN — Medication 0.76 MG: at 23:30

## 2017-08-23 RX ADMIN — Medication 10 ML: at 21:30

## 2017-08-23 RX ADMIN — Medication: at 15:25

## 2017-08-23 RX ADMIN — HYDROMORPHONE HYDROCHLORIDE 1 MG: 1 INJECTION, SOLUTION INTRAMUSCULAR; INTRAVENOUS; SUBCUTANEOUS at 05:24

## 2017-08-23 NOTE — PROGRESS NOTES
PT Note:    Chart reviewed and attempted to see patient for PT weekly reassessment. Patient politely declining participation at this time as she has a visitor. Patient states she plans to ambulate with family assistance later this PM.  Per chart, patient is SBA and clear to ambulate with family. Will f/u tomorrow for reassessment. Encourage OOB to chair over the and ambulation with SBA x1. Patient likely appropriate for decrease in frequency for therapy at re-assessment next session.

## 2017-08-23 NOTE — PROGRESS NOTES
General Surgery Daily Progress Note    Admit Date: 2017  Post-Operative Day: 41 Days Post-Op from Procedure(s) with comments:  SPECIAL PROCEDURE OUTSIDE OF OR - central line placement     Subjective:     Last 24 hrs: Pt said she had a rough night w/o the PCA basal dose of morphine. Palliative adjusted the demand dose to allow basal.     Goal is to get to <300mg in 24hrs. Objective:     Blood pressure 121/83, pulse 100, temperature 97.9 °F (36.6 °C), resp. rate 18, height 5' 4\" (1.626 m), weight 339 lb 1.1 oz (153.8 kg), SpO2 96 %, not currently breastfeeding. Temp (24hrs), Av.1 °F (36.7 °C), Min:97.5 °F (36.4 °C), Max:98.9 °F (37.2 °C)      _____________________  Physical Exam:     Alert and Oriented, x3, in no acute distress. Cardiovascular: RRR, no peripheral edema  Abdomen: wound mgr w/ yellow-brown contents in bag; red rubber intact    Assessment:   Active Problems:    Abdominal pain (2017)      Small bowel ischemia ([address] (2017)      Overview: CT abd/pelvis 17      1. The stomach and proximal small bowel loops are distended. There is a       loop of      small bowel in the midabdomen which is mildly dilated and does not       demonstrate      enhancement in the wall and there is suspicion of pneumatosis and this       leads to      a loop of small bowel which demonstrates thickening of the wall and       findings are      suspicious for ischemia. 2. There is extensive mesenteric edema and a small amount of ascites in       the      pelvis. 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 There is nonocclusive thrombus in the SMA.             Enterocutaneous fistula (2017)            Plan:     Cont morphine adjustment/wean (PCA)  Cont po morphine as ordered  Renew TPN  Data Review:    Recent Labs      17   0504   WBC  8.1   HGB  7.6*   HCT  24.1*   PLT  448*     Recent Labs      17   0504   NA  137   K 3.6   CL  104   CO2  27   GLU  108*   BUN  16   CREA  0.68   CA  8.7   MG  1.7   PHOS  5.0*     No results for input(s): AML, LPSE in the last 72 hours.         ______________________  Medications:    Current Facility-Administered Medications   Medication Dose Route Frequency    TPN ADULT - CENTRAL AA 5% D20% W/ ELECTROLYTES AND CA   IntraVENous CONTINUOUS    dextrose (D50W) injection syrg 12.5-25 g  12.5-25 g IntraVENous PRN    TPN ADULT - CENTRAL AA 5% D20% W/ ELECTROLYTES AND CA   IntraVENous CONTINUOUS    morphine CR (MS CONTIN) tablet 60 mg  60 mg Oral Q8H    insulin lispro (HUMALOG) injection   SubCUTAneous BID    pantoprazole (PROTONIX) tablet 40 mg  40 mg Oral ACB    0.9% sodium chloride infusion 250 mL  250 mL IntraVENous PRN    acetaminophen (TYLENOL) tablet 650 mg  650 mg Oral Q4H PRN    LORazepam (ATIVAN) injection 0.76 mg  0.76 mg IntraVENous Q8H    clopidogrel (PLAVIX) tablet 75 mg  75 mg Oral DAILY    sodium chloride (NS) flush 10 mL  10 mL InterCATHeter Q24H    sodium chloride (NS) flush 10 mL  10 mL InterCATHeter PRN    sodium chloride (NS) flush 10-40 mL  10-40 mL InterCATHeter Q8H    alteplase (CATHFLO) 1 mg in sterile water (preservative free) 1 mL injection  1 mg InterCATHeter PRN    bacitracin 500 unit/gram packet 1 Packet  1 Packet Topical PRN    0.9% sodium chloride infusion 250 mL  250 mL IntraVENous PRN    lactated Ringers infusion  25 mL/hr IntraVENous CONTINUOUS    fat emulsion 20% (LIPOSYN, INTRALIPID) infusion 500 mL  500 mL IntraVENous Q MON, WED & FRI    HYDROmorphone (PF) (DILAUDID) injection 1 mg  1 mg IntraVENous Q1H PRN    aspirin chewable tablet 81 mg  81 mg Oral DAILY    morphine (PF)  mg/30 ml   IntraVENous CONTINUOUS    glucose chewable tablet 16 g  4 Tab Oral PRN    glucagon (GLUCAGEN) injection 1 mg  1 mg IntraMUSCular PRN    prochlorperazine (COMPAZINE) with saline injection 5 mg  5 mg IntraVENous Q6H PRN    ondansetron (ZOFRAN) injection 8 mg 8 mg IntraVENous Q6H PRN    sodium chloride (NS) flush 5-10 mL  5-10 mL IntraVENous Q8H    sodium chloride (NS) flush 5-10 mL  5-10 mL IntraVENous PRN    naloxone (NARCAN) injection 0.4 mg  0.4 mg IntraVENous PRN    diphenhydrAMINE (BENADRYL) injection 12.5 mg  12.5 mg IntraVENous Q4H PRN       Latoya Brooks NP  8/23/2017    Pt seen and examined  No new complaints  Pain being managed by palliative though its seems  As though they have gotten her below the threshold that JAIRO rao  Eakens pouch functioning  Continue TPN  Await transfer

## 2017-08-23 NOTE — PROGRESS NOTES
NUTRITION COMPLETE ASSESSMENT    RECOMMENDATIONS:   1. Resume goal TPN volume and AA/Dextrose concentrations to prevent dehydration and meet 100% of estimated needs (most recent TPN meets 84% and 91% of estimated kcal and protein needs, respectively). GOAL TPN (macronutrients):  5%AA D20 @ 85 mL/hr x 1 hour    @ 172 mL/hr x 13 hours    @ 85 mL/hr x last hour   + 20% lipids, 500 mL 3x/week  -- Check BG two hours into infusion and one hour after infusion is completed    2. Continue daily weights (obtain updated standing scale weight as able)    3. Consider requesting trace elements and zinc sulfate daily in TPN (automatically changed to 3x/week) secondary to drain outputs of >3Liters per day. Interventions/Plan:   Food/Nutrient Delivery: TPN as sole source of nutrition    Assessment:   Reason for Assessment:   [x]Reassessment     Diet: NPO with ice chips  Nutritionally Significant Medications: [x] Reviewed & Includes: humalog correction scale; LR @ 25 mL/hr   TPN: 5%AA D20 @ 83 mL/hr + MVI, thiamine (100 mg), zinc sulfate 10 mg + 20% lipids, 500 mL 3x/week    Subjective:  Pt tearful during visit secondary to pain and fear that she urinated over the purwick pad. She is eager to get to [address] [place] [address], but discharge has been delayed. Objective:  Chart reviewed, discussed with RN. Pt remains on TPN as sole source of nutrition. Currently on continuous TPN (unclear why changed from cyclic regimen). Would recommend resumption of goal TPN to prevent nutrition decline in the setting of increased energy expenditure and large wound with wound vac. Current TPN (as above) is providing a daily average of 2184 kcal, 100 g protein in 1992 mL-- meeting 84% and 91% of estimated kcal and protein needs, respectively. Previous goal TPN was providing a daily average of 2546 kcal, 120 g protein in 2406 mL-- meeting 100% of estimated needs.     BG WDL (no correction scale needed x 10+ days)    Drain output:   8/22: 3400 mL  8/21:1800 mL   8/20: 3275 mL    Estimated Nutrition Needs:   Kcals/day: 3812 Kcals/day (7691-5023 kcal/day [facility]  [place] x 1.2-1.3))  Protein: 110 g (110-136 g/day (2-2.5 g/kg IBW))  Fluid:  (1  ml/kcal)     Based On: [place] [address] Jeor  Weight Used: Actual wt (150.4 kg (standing scale weight 8/9/17))    Pt expected to meet estimated nutrient needs:  []   Yes     [x]  No     Nutrition Diagnosis:   1. Inadequate oral food/beverage intake related to unresectable ischemic bowel  as evidenced by NPO with TPN as sole source of nutrition    Goals:     TPN to meet at least 90% of estimated needs over the next 5-7 days     Monitoring & Evaluation:    - Enteral/parenteral nutrition intake   - Electrolyte and renal profile, Weight/weight change, GI profile      Previous Nutrition Goals Met:  Yes  Previous Recommendations:      N/A    Education & Discharge Needs:   [x] None Identified   [] Identified and addressed    [x] Participated in care plan, discharge planning, and/or interdisciplinary rounds        Cultural, Roman Catholic and ethnic food preferences identified:   None    Skin Integrity: []Intact  [x]Other: see detailed wound documentation/wound vac  Edema: []None [x]Other: 1+ non-pitting (LUE, LLE, RUE, RLE)  Last BM: 8/23/17  Food Allergies: [x]None []Other    Anthropometrics:    Weight Loss Metrics 8/22/2017 6/25/2017 6/25/2017 6/25/2017 6/22/2017 6/12/2017 6/6/2017   Today's Wt 339 lb 1.1 oz - 343 lb - 343 lb 343 lb 14.7 oz -   BMI - 58.2 kg/m2 - 58.88 kg/m2 58.88 kg/m2 - 59.03 kg/m2      Last 3 Recorded Weights in this Encounter    08/20/17 0717 08/21/17 1247 08/22/17 0845   Weight: 153.5 kg (338 lb 6.5 oz) 152.7 kg (336 lb 10.3 oz) 153.8 kg (339 lb 1.1 oz)      Weight Source: Bed  Height: 5' 4\" (162.6 cm),    Body mass index is 58.2 kg/(m^2).   IBW : 54.4 kg (120 lb), % IBW (Calculated): 288.8 %   ,      Labs:    Lab Results   Component Value Date/Time    Sodium 137 08/22/2017 05:04 AM    Potassium 3.6 08/22/2017 05:04 AM    Chloride 104 08/22/2017 05:04 AM    CO2 27 08/22/2017 05:04 AM    Glucose 108 08/22/2017 05:04 AM    BUN 16 08/22/2017 05:04 AM    Creatinine 0.68 08/22/2017 05:04 AM    Calcium 8.7 08/22/2017 05:04 AM    Magnesium 1.7 08/22/2017 05:04 AM    Phosphorus 5.0 08/22/2017 05:04 AM    Albumin 1.5 07/08/2017 04:42 AM     No results found for: HBA1C, HGBE8, NJE8EKOW, RQQ9XALO  Lab Results   Component Value Date/Time    Glucose 108 08/22/2017 05:04 AM    Glucose (POC) 112 08/23/2017 11:28 AM      Lab Results   Component Value Date/Time    ALT (SGPT) 13 07/08/2017 04:42 AM    AST (SGOT) 11 07/08/2017 04:42 AM    Alk.  phosphatase 120 07/08/2017 04:42 AM    Bilirubin, direct 0.1 07/07/2009 06:38 PM    Bilirubin, total 0.6 07/08/2017 04:42 AM      [address]

## 2017-08-23 NOTE — PROGRESS NOTES
CM reviewed chart and spoke with [facility] Representative. CM faxed MARS and Palliative Care note to [facility] for their staff and pharmacy to review to see if they can accommodate pt's current PCA Pump. CM will continue to follow.   MOUNIKA Soto, ACM

## 2017-08-23 NOTE — PROGRESS NOTES
Palliative Medicine Consult  [facility]: [phone] ([phone])    Patient Name: Diamond Conrad  YOB: 1975    Date of Initial Consult: 6/27/17  Reason for Consult: overwhelming symptoms  Requesting Provider: Emily Martinez NP  Primary Care Physician: No primary care provider on file. SUMMARY:   Diamond Conrad is a 46 y.o. with a past history of Crohn's disease,anxiety,  chronic pain, hx DVT, multiple (4) small bowel resections, s/p recent urgent dx lap, with lysis of adhesions, repair of suspected perf 6/7/17 , who was admitted on 6/25/2017 from home with a diagnosis of worsening abdominal pain, elevated WBC and MRSA wound infection. Current medical issues leading to Palliative Medicine involvement include: Crohn's flare (seen on enteroscopy), abdominal pain, MRSA wound infection, post op wound pain, nausea. .    Patient is followed chronically for pain management by Dr. Ray Ocasio at [facility]. Home regimen for chronic abdominal pain is MS Contin 60 Q8 and MS IR 30 Q6 PRN. She also takes Xanax 1mg TID prn anxiety    6/7/17 exp lap  6/28/17 exp lap  7/7/17 exp lap, lysis of adhesions, fistula excl     PALLIATIVE DIAGNOSES:   1. Acute on chronic abdominal pain. Multiple abdominal surgeries  2. Nausea  3. Crohn's disease (currently off meds due to acute infection/surgery)  4. MRSA wound   5. Anxiety about health  6. Homesickness  7. Acute grief, change in health. PLAN:   1. Pain - working on transition to pain regimen that can be managed at [facility].  (Morphine IV use needs to be less than 300mg/24hrs). Can also have IV Dilaudid use. 2. Uncertain how much MSContin pt now absorbing given mult abd surgeries, but it was working prior to this admission. Has much higher tolerance now. 3. Based on flowsheet, documentation was done last night at 630am, but not since then and earlier this morning. Upon PCA interrogation, pt used 25mg in past 4h of Morphine PCA.  If this is extrapolated, then this is equivalent to 150mg IV Morphine via PCA in a 24h time period. 4. Pt is also using Dilaudid IV push and her MSContin 80mg tid (incr back to home dose yest) but based on previous conversations- this should be allow pt to be discharged to [address] [place] [address] (needed <300mg of morphine in 24h period). 5. Recommendations:  1. Morphine PCA 1mg/h basal, 4mg IV every 15 min demand (changed from yest to allow small basal, but decreased demand dose). 2. MSContin 60mg tid   3. Dilaudid 1mg IV every 3-4h prn   4. If pt has difficulty weaning from PCA at [address] [place] [address], consideration of Methadone or Fentanyl patch should be done. 5. . Discussed with bedside nurse Sierra Rosario Care management      GOALS OF CARE / TREATMENT PREFERENCES:   [====Goals of Care====]  GOALS OF CARE:  Patient / health care proxy stated goals: pain control  Recovery from acute issues      TREATMENT PREFERENCES:   Code Status: Full Code    Advance Care Planning:  Advance Care Planning 6/26/2017   Patient's Healthcare Decision Maker is: Legal Next of Camilo Jiménez   Primary Decision Maker Name Tresa Fragoso   Primary Decision Maker Phone Number [phone]   Primary Decision Maker Relationship to Patient Parent   Confirm Advance Directive None   Patient Would Like to Complete Advance Directive No       Other:    The palliative care team has discussed with patient / health care proxy about goals of care / treatment preferences for patient.  [====Goals of Care====]         HISTORY:         HPI/SUBJECTIVE:    The patient is:   [x] Verbal and participatory  [] Non-participatory due to:       Pt w/ pain a bit worse today, noticed the basal being stopped yest made it harder to sleep. Got up and walked a bit yesterday. Nervous about going to [facility].      Clinical Pain Assessment (nonverbal scale for severity on nonverbal patients):   [++++ Clinical Pain Assessment++++]  [++++Pain Severity++++]: Pain: 6  [++++Pain Character++++]: sharp and burning, stabbing  [++++Pain Duration++++]: several days  [++++Pain Effect++++]: hard to walk, feeling anxious  [++++Pain Factors++++]: better after pain medicaion  [++++Pain Frequency++++]: constant  [++++Pain Location++++]: across abdomen both sides in middle  [++++ Clinical Pain Assessment++++]     FUNCTIONAL ASSESSMENT:     Palliative Performance Scale (PPS):  PPS: 40       PSYCHOSOCIAL/SPIRITUAL SCREENING:     Advance Care Planning:  Advance Care Planning 6/26/2017   Patient's Healthcare Decision Maker is: Legal Next of Camilo Jiménez   Primary Decision Maker Name Kiya Shannon   Primary Decision Maker Phone Number [phone]   Primary Decision Maker Relationship to Patient Parent   Confirm Advance Directive None   Patient Would Like to Complete Advance Directive No        Any spiritual / Methodist concerns:  [] Yes /  [x] No    Caregiver Burnout:  [] Yes /  [x] No /  [] No Caregiver Present      Anticipatory grief assessment:   [x] Normal  / [] Maladaptive       ESAS Anxiety: Anxiety: 3    ESAS Depression: Depression: 2        REVIEW OF SYSTEMS:     Positive and pertinent negative findings in ROS are noted above in HPI. The following systems were [x] reviewed / [] unable to be reviewed as noted in HPI  Other findings are noted below. Systems: constitutional, ears/nose/mouth/throat, respiratory, gastrointestinal, genitourinary, musculoskeletal, integumentary, neurologic, psychiatric, endocrine. Positive findings noted below. Modified ESAS Completed by: provider   Fatigue: 4 Drowsiness: 0   Depression: 2 Pain: 6   Anxiety: 3 Nausea: 3   Anorexia: 10 Dyspnea: 0     Constipation: No              PHYSICAL EXAM:     From RN flowsheet:  Wt Readings from Last 3 Encounters:   08/22/17 339 lb 1.1 oz (153.8 kg)   06/25/17 343 lb (155.6 kg)   06/22/17 343 lb (155.6 kg)     Blood pressure 121/83, pulse 100, temperature 97.9 °F (36.6 °C), resp. rate 18, height 5' 4\" (1.626 m), weight 339 lb 1.1 oz (153.8 kg), SpO2 96 %, not currently breastfeeding.     Pain Scale 1: Numeric (0 - 10)  Pain Intensity 1: 5  Pain Onset 1:  (Chronic)  Pain Location 1: Abdomen  Pain Orientation 1: Anterior  Pain Description 1: Constant  Pain Intervention(s) 1: Medication (see MAR)  Last bowel movement, if known: ostomy     Constitutional: NAD, a bit tearful   Eyes: pupils equal, anicteric  ENMT: no nasal discharge, moist mucous membranes  Cardiovascular: regular rhythm, distal pulses intact  Respiratory: breathing not labored, symmetric  Gastrointestinal: midline incision healing, no exudate +ostomy w stool, hypoactive bowel sounds   Musculoskeletal: no deformity, no tenderness to palpation  Skin: warm, dry  No edema  Neurologic: following commands, moving all extremities  Able to ambulate  Psychiatric: full affect, anxious       HISTORY:     Active Problems:    Abdominal pain (6/25/2017)      Small bowel ischemia ([address] (6/28/2017)      Overview: CT abd/pelvis 6/28/17      1. The stomach and proximal small bowel loops are distended. There is a       loop of      small bowel in the midabdomen which is mildly dilated and does not       demonstrate      enhancement in the wall and there is suspicion of pneumatosis and this       leads to      a loop of small bowel which demonstrates thickening of the wall and       findings are      suspicious for ischemia. 2. There is extensive mesenteric edema and a small amount of ascites in       the      pelvis. 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 There is nonocclusive thrombus in the SMA.             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 Myriam Baker History   Problem Relation Age of Onset    Hypertension Father     Stroke Father     Other Father      arthritis      History reviewed, no pertinent family history.   Social History   Substance Use Topics    Smoking status: Former Smoker    Smokeless tobacco: Not on file    Alcohol use No     Allergies   Allergen Reactions    Demerol [Meperidine] Unknown (comments)    Soma [Carisoprodol] Rash and Nausea Only    Sulfa (Sulfonamide Antibiotics) Unknown (comments)    Toradol [Ketorolac Tromethamine] Unknown (comments)      Current Facility-Administered Medications   Medication Dose Route Frequency    dextrose (D50W) injection syrg 12.5-25 g  12.5-25 g IntraVENous PRN    TPN ADULT - CENTRAL AA 5% D20% W/ ELECTROLYTES AND CA   IntraVENous CONTINUOUS    morphine CR (MS CONTIN) tablet 60 mg  60 mg Oral Q8H    insulin lispro (HUMALOG) injection   SubCUTAneous BID    pantoprazole (PROTONIX) tablet 40 mg  40 mg Oral ACB    0.9% sodium chloride infusion 250 mL  250 mL IntraVENous PRN    acetaminophen (TYLENOL) tablet 650 mg  650 mg Oral Q4H PRN    LORazepam (ATIVAN) injection 0.76 mg  0.76 mg IntraVENous Q8H    clopidogrel (PLAVIX) tablet 75 mg  75 mg Oral DAILY    sodium chloride (NS) flush 10 mL  10 mL InterCATHeter Q24H    sodium chloride (NS) flush 10 mL  10 mL InterCATHeter PRN    sodium chloride (NS) flush 10-40 mL  10-40 mL InterCATHeter Q8H    alteplase (CATHFLO) 1 mg in sterile water (preservative free) 1 mL injection  1 mg InterCATHeter PRN    bacitracin 500 unit/gram packet 1 Packet  1 Packet Topical PRN    0.9% sodium chloride infusion 250 mL  250 mL IntraVENous PRN    lactated Ringers infusion  25 mL/hr IntraVENous CONTINUOUS    fat emulsion 20% (LIPOSYN, INTRALIPID) infusion 500 mL  500 mL IntraVENous Q MON, WED & FRI    HYDROmorphone (PF) (DILAUDID) injection 1 mg  1 mg IntraVENous Q1H PRN    aspirin chewable tablet 81 mg  81 mg Oral DAILY    morphine (PF)  mg/30 ml   IntraVENous CONTINUOUS    glucose chewable tablet 16 g  4 Tab Oral PRN    glucagon (GLUCAGEN) injection 1 mg  1 mg IntraMUSCular PRN    prochlorperazine (COMPAZINE) with saline injection 5 mg  5 mg IntraVENous Q6H PRN    ondansetron (ZOFRAN) injection 8 mg  8 mg IntraVENous Q6H PRN    sodium chloride (NS) flush 5-10 mL  5-10 mL IntraVENous Q8H    sodium chloride (NS) flush 5-10 mL  5-10 mL IntraVENous PRN    naloxone (NARCAN) injection 0.4 mg  0.4 mg IntraVENous PRN    diphenhydrAMINE (BENADRYL) injection 12.5 mg  12.5 mg IntraVENous Q4H PRN          LAB AND IMAGING FINDINGS:     Lab Results   Component Value Date/Time    WBC 8.1 08/22/2017 05:04 AM    HGB 7.6 08/22/2017 05:04 AM    PLATELET 683 21/65/1224 05:04 AM     Lab Results   Component Value Date/Time    Sodium 137 08/22/2017 05:04 AM    Potassium 3.6 08/22/2017 05:04 AM    Chloride 104 08/22/2017 05:04 AM    CO2 27 08/22/2017 05:04 AM    BUN 16 08/22/2017 05:04 AM    Creatinine 0.68 08/22/2017 05:04 AM    Calcium 8.7 08/22/2017 05:04 AM    Magnesium 1.7 08/22/2017 05:04 AM    Phosphorus 5.0 08/22/2017 05:04 AM      Lab Results   Component Value Date/Time    AST (SGOT) 11 07/08/2017 04:42 AM    Alk. phosphatase 120 07/08/2017 04:42 AM    Protein, total 7.0 07/08/2017 04:42 AM    Albumin 1.5 07/08/2017 04:42 AM    Globulin 5.5 07/08/2017 04:42 AM     No results found for: INR, PTMR, PTP, PT1, PT2, APTT   No results found for: IRON, FE, TIBC, IBCT, PSAT, FERR   No results found for: PH, PCO2, PO2  No components found for: GLPOC   No results found for: CPK, CKMB             Total time: 35  Counseling / coordination time, spent as noted above: 25  > 50% counseling / coordination?:   yes  Prolonged service was provided for  []30 min   []75 min in face to face time in the presence of the patient, spent as noted above.   Time Start:   Time End:   Note: this can only be billed with 78829 (initial) or 21015 (follow up). If multiple start / stop times, list each separately.

## 2017-08-24 VITALS
WEIGHT: 293 LBS | TEMPERATURE: 98 F | HEIGHT: 64 IN | HEART RATE: 94 BPM | SYSTOLIC BLOOD PRESSURE: 112 MMHG | BODY MASS INDEX: 50.02 KG/M2 | OXYGEN SATURATION: 100 % | RESPIRATION RATE: 18 BRPM | DIASTOLIC BLOOD PRESSURE: 69 MMHG

## 2017-08-24 LAB
ANION GAP SERPL CALC-SCNC: 7 MMOL/L (ref 5–15)
BASOPHILS # BLD: 0 K/UL (ref 0–0.1)
BASOPHILS NFR BLD: 0 % (ref 0–1)
BUN SERPL-MCNC: 16 MG/DL (ref 6–20)
BUN/CREAT SERPL: 21 (ref 12–20)
CALCIUM SERPL-MCNC: 9 MG/DL (ref 8.5–10.1)
CHLORIDE SERPL-SCNC: 101 MMOL/L (ref 97–108)
CO2 SERPL-SCNC: 29 MMOL/L (ref 21–32)
CREAT SERPL-MCNC: 0.75 MG/DL (ref 0.55–1.02)
EOSINOPHIL # BLD: 0.4 K/UL (ref 0–0.4)
EOSINOPHIL NFR BLD: 3 % (ref 0–7)
ERYTHROCYTE [DISTWIDTH] IN BLOOD BY AUTOMATED COUNT: 14.6 % (ref 11.5–14.5)
GLUCOSE BLD STRIP.AUTO-MCNC: 117 MG/DL (ref 65–100)
GLUCOSE SERPL-MCNC: 90 MG/DL (ref 65–100)
HCT VFR BLD AUTO: 25.6 % (ref 35–47)
HGB BLD-MCNC: 8.1 G/DL (ref 11.5–16)
LYMPHOCYTES # BLD: 3 K/UL (ref 0.8–3.5)
LYMPHOCYTES NFR BLD: 28 % (ref 12–49)
MAGNESIUM SERPL-MCNC: 1.5 MG/DL (ref 1.6–2.4)
MCH RBC QN AUTO: 27.6 PG (ref 26–34)
MCHC RBC AUTO-ENTMCNC: 31.6 G/DL (ref 30–36.5)
MCV RBC AUTO: 87.4 FL (ref 80–99)
MONOCYTES # BLD: 0.7 K/UL (ref 0–1)
MONOCYTES NFR BLD: 7 % (ref 5–13)
NEUTS SEG # BLD: 6.7 K/UL (ref 1.8–8)
NEUTS SEG NFR BLD: 62 % (ref 32–75)
PHOSPHATE SERPL-MCNC: 4.7 MG/DL (ref 2.6–4.7)
PLATELET # BLD AUTO: 430 K/UL (ref 150–400)
POTASSIUM SERPL-SCNC: 3.6 MMOL/L (ref 3.5–5.1)
RBC # BLD AUTO: 2.93 M/UL (ref 3.8–5.2)
SERVICE CMNT-IMP: ABNORMAL
SODIUM SERPL-SCNC: 137 MMOL/L (ref 136–145)
WBC # BLD AUTO: 10.9 K/UL (ref 3.6–11)

## 2017-08-24 PROCEDURE — 74011250637 HC RX REV CODE- 250/637: Performed by: INTERNAL MEDICINE

## 2017-08-24 PROCEDURE — 84100 ASSAY OF PHOSPHORUS: CPT | Performed by: SURGERY

## 2017-08-24 PROCEDURE — 80048 BASIC METABOLIC PNL TOTAL CA: CPT | Performed by: SURGERY

## 2017-08-24 PROCEDURE — 82962 GLUCOSE BLOOD TEST: CPT

## 2017-08-24 PROCEDURE — 85025 COMPLETE CBC W/AUTO DIFF WBC: CPT | Performed by: SURGERY

## 2017-08-24 PROCEDURE — 74011250636 HC RX REV CODE- 250/636: Performed by: INTERNAL MEDICINE

## 2017-08-24 PROCEDURE — 74011250637 HC RX REV CODE- 250/637: Performed by: SURGERY

## 2017-08-24 PROCEDURE — 74011250636 HC RX REV CODE- 250/636: Performed by: PHYSICAL MEDICINE & REHABILITATION

## 2017-08-24 PROCEDURE — 74011250636 HC RX REV CODE- 250/636: Performed by: EMERGENCY MEDICINE

## 2017-08-24 PROCEDURE — 36415 COLL VENOUS BLD VENIPUNCTURE: CPT | Performed by: SURGERY

## 2017-08-24 PROCEDURE — 83735 ASSAY OF MAGNESIUM: CPT | Performed by: SURGERY

## 2017-08-24 RX ADMIN — HYDROMORPHONE HYDROCHLORIDE 1 MG: 1 INJECTION, SOLUTION INTRAMUSCULAR; INTRAVENOUS; SUBCUTANEOUS at 02:43

## 2017-08-24 RX ADMIN — ASPIRIN 81 MG CHEWABLE TABLET 81 MG: 81 TABLET CHEWABLE at 08:21

## 2017-08-24 RX ADMIN — Medication 10 ML: at 06:00

## 2017-08-24 RX ADMIN — Medication 10 ML: at 14:13

## 2017-08-24 RX ADMIN — Medication: at 02:40

## 2017-08-24 RX ADMIN — MORPHINE SULFATE 60 MG: 60 TABLET, FILM COATED, EXTENDED RELEASE ORAL at 14:08

## 2017-08-24 RX ADMIN — CLOPIDOGREL BISULFATE 75 MG: 75 TABLET ORAL at 08:21

## 2017-08-24 RX ADMIN — HYDROMORPHONE HYDROCHLORIDE 1 MG: 1 INJECTION, SOLUTION INTRAMUSCULAR; INTRAVENOUS; SUBCUTANEOUS at 10:10

## 2017-08-24 RX ADMIN — Medication 0.76 MG: at 05:52

## 2017-08-24 RX ADMIN — MORPHINE SULFATE 60 MG: 60 TABLET, FILM COATED, EXTENDED RELEASE ORAL at 05:52

## 2017-08-24 RX ADMIN — Medication 0.76 MG: at 14:09

## 2017-08-24 RX ADMIN — HYDROMORPHONE HYDROCHLORIDE 1 MG: 1 INJECTION, SOLUTION INTRAMUSCULAR; INTRAVENOUS; SUBCUTANEOUS at 00:46

## 2017-08-24 RX ADMIN — Medication 40 ML: at 06:00

## 2017-08-24 RX ADMIN — HYDROMORPHONE HYDROCHLORIDE 1 MG: 1 INJECTION, SOLUTION INTRAMUSCULAR; INTRAVENOUS; SUBCUTANEOUS at 05:51

## 2017-08-24 RX ADMIN — HYDROMORPHONE HYDROCHLORIDE 1 MG: 1 INJECTION, SOLUTION INTRAMUSCULAR; INTRAVENOUS; SUBCUTANEOUS at 08:21

## 2017-08-24 RX ADMIN — PANTOPRAZOLE SODIUM 40 MG: 40 TABLET, DELAYED RELEASE ORAL at 08:21

## 2017-08-24 NOTE — PROGRESS NOTES
Coordinated with АНДРЕЙ Tucker, re discharge documentation. She observed that the discharge documentation done 3 days ago remains valid. Now attempting to reach Care Management to arrange the patient's transport. Just left a voice mail for Lelia Castro, Care Management.

## 2017-08-24 NOTE — PROGRESS NOTES
Palliative Medicine:    Pleased that [facility] can accept pt on current morphine PCA. Recommend continuing to wean, consider Fentanyl patch if MSContin does not seem to work given absorption issues. If pt readmitted, consider reconsulting our team to help w/ pain issues and support.

## 2017-08-24 NOTE — PROGRESS NOTES
Ms. Hadley Millburn to be discharged to [facility] via ambulance this afternoon. Initial discharge delayed by 2 day because [facility] could not adequately meet her Morphine needs. Palliative Care consulted. Greatly appreciate their help with decreasing analgesic needs. Patient apprehensive of transfer and requesting pain medication prior to discharge. Updated AVS completed. Paperwork completed & signed.

## 2017-08-24 NOTE — PROGRESS NOTES
Patient having dressing changed. Patient will need weekly re-evaluation. Will follow up as appropriate and as time allows. Thanks!     Rebecca Tavares PT, DPT

## 2017-08-24 NOTE — PROGRESS NOTES
I have reviewed discharge instructions with the patient. The patient verbalized understanding. Gave discharge instructions package to transport crew.

## 2017-08-24 NOTE — WOUND CARE
WOCN Note:     Follow-up visit for fistula pouch change. Chart shows:  Admitted for nausea, vomiting, and abdominal pain; history of Crohn's, DVT, bowel perforation with surgery 6/7/17. Exploratory lap, KATHRINE, fistula exclusion and tube placement by Dr. Harish Crouch 7/7/17.      Assessment:   Patient is A&O x4 and mobile around room and in hallway. Bed: total care bariatric sport  Patient using PureWick.    Diet: NPO with sips & TPN  Pre-medicated for pain by RN and uses PCA during wound care.      Mucus fistula LLQ: red & moist and almost at skin level; some mucosal transplantation; output is light green mucus. Activelife, 1-piece, flat pouch with good seal.       LUQ red rubber catheter to bedside bag. Bilious output in bedside bag.  The insertion of this catheter is NO LONGER seen on the left margin of wound bed into proximal lumen of small intestine. Effluent leaks around cath insertion in wound bed and at skin level.  Red rubber cath secured with tape to skin.      Insertion site of [place]ot drain placed in OR & now removed; RUQ = 0.5 x 0.5 x 0.1 cm.  100% moist red.  Moist plug of packing placed.       Rabia drain site in RLQ covered with gauze.       Midline abdominal dehisced incision = 11 x 6 x ~0.5 cm.  Ana inward with puckered margins and deep creases @ 3 & 9 o'clock. Margins are flat into wound bed with new epithelial tissue noted. Base is 75% moist red mucosal tissue with peristalsis & 25% moist pink with some mucus. Skin protected with [place] all around wound. Pouched using a large Hawk's wound manager with paste and plugs of Hawk's rings in dc on left and right and connected to bedside bag. The last pouch held 3 days with problems of red rubber leaking at skin insertion.       Recommendations:    Change mucus fistula pouch in LLQ as needed ~every 3 days. Secure red rubber cath well.    Encourage ambulation and continence. Discussed above plan with patient & RN, .     Transition of Care: Plan to follow as needed while admitted to hospital.    SONY Tomlinson, RN, [facility]  Certified Wound, Ostomy, Continence Nurse  office [phone]  pager [phone] or call  to page

## 2017-08-24 NOTE — PROGRESS NOTES
Bedside shift change report given to Vince Cooper (oncoming nurse) by Colton Durham RN (offgoing nurse). Report included the following information SBAR and Kardex.

## 2017-08-24 NOTE — PROGRESS NOTES
Morphine syringe for PCA was empty. I showed it to MAURI SHELLEY, who advised disposing of it. I put it in the red bin in the [facility] room.

## 2017-08-24 NOTE — PROGRESS NOTES
Care Management Interventions  PCP Verified by CM: Yes (Dr. Garrison Adair)  Mode of Transport at Discharge: BLS [facility])  Hospital Transport Time of Discharge: 1430  Transition of Care Consult (CM Consult): Discharge Planning  MyChart Signup: No  Discharge Durable Medical Equipment: No  Physical Therapy Consult: Yes  Occupational Therapy Consult: Yes  Speech Therapy Consult: No  Current Support Network: Lives with Caregiver  Confirm Follow Up Transport: Family  Plan discussed with Pt/Family/Caregiver: Yes  Freedom of Choice Offered: Yes  Discharge Location  Discharge Placement: [facility] ([facility] (Michael French)    CM received call from [facility] stating that they can accept pt's Morphine PCA today and have a bed available for pt today. Pt is going into Room 302 at [facility]. Accepting MD is Dr. Danyell Purcell.  Call report to [phone]. CM faxed AVS, discharge summary, and prescriptions to [facility]. Envelope containing MARs, Kardex, AVS, discharge summary, prescriptions, wound care notes, palliative care notes, and emtalas will be sent with pt. Pt is in agreement with discharge plan.   Transport set up with [facility] for 2:30pm.  MOUNIKA Ac, ACJUAN FRANCISCO

## 2017-08-24 NOTE — PROGRESS NOTES
Music Therapy Assessment    Christina Cassidy [id number]  [id number]    1975  46 y.o.  female    Patient Telephone Number: [phone] (home)   Amish Affiliation: Non Jewish   Language: English   Extended Emergency Contact Information  Primary Emergency Contact: Coni Valle  Address: [address] [place] [address] Phone: [phone]  Work Phone: [phone]  Mobile Phone: [phone]  Relation: Parent   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    Small bowel ischemia ([address] 06/28/2017    Superior mesenteric artery thrombosis ([facility] 06/28/2017    Abdominal pain 06/25/2017    Perforated bowel ([address] 06/06/2017    Right flank pain 08/22/2011    Crohn's disease ([facility] 08/15/2011    DVT (deep venous thrombosis) ([facility] 08/15/2011    Incarcerated ventral hernia 08/15/2011        Date: 8/24/2017       Mental Status:   [  ] Alert [  ] Nehemias Prieto [  ]  Confused  [  ] Minimally responsive -N/A: Please see Session Observations below. Communication Status: [  ] Impaired Speech [  ] Nonverbal     Physical Status:   [  ] Oxygen in use  [  ] Hard of Hearing [  ] Vision Impaired  [  ] Ambulatory  [  ] Ambulatory with assistance [  ] Non-ambulatory     Music Preferences, Background: N/A: Please see Session Observations below. Clinical Problem addressed: N/A: Please see Session Observations below.     Goal(s) met in session: N/A: \"                                       \"  Physical/Pain management (Scale of 1-10):    Pre-session rating ___________    Post-session rating __________   [  ] Increased relaxation   [  ] Regulated breathing patterns  [  ] Decreased muscle tension   [  ] Minimized physical distress     Emotional/Psychological:  [  ] Increased self-expression   [  ] Decreased aggressive behavior   [  ] Decreased sadness   [  ] Discussed healthy coping skills     [  ] Improved mood    [  ] Decreased withdrawn behavior     Social:  [  ] Decreased feelings of isolation/loneliness [  ] Positive social interaction   [  ] Provided support and/or comfort for family/friends    Spiritual:  [  ] Spiritual support    [  ] Expressed peace  [  ] Expressed jalen    [  ] Discussed beliefs    Techniques Utilized (Check all that apply): N/A: Please see Session Observations below. [  ] Procedural support MT [  ] Music for relaxation [  ] Patient preferred music  [  ] Suzie analysis  [  ] Song choice  [  ] Music for validation  [  ] Entrainment  [  ] Progressive muscle relax. [  ] Guided visualization  [  ] Nila Jacques  [  ] Patient instrument playing [  ] Sal Myers writing  [  ] Pink Due along   [  ] Jessica Huertas  [  ] Sensory stimulation  [  ] Active Listening  [  ] Music for spiritual support [  ] Making of CDs as gifts    Session Observations:  F/u visit; This music therapist (MT) attempted to offer music therapy to the patient (pt), but learned from the pt's nurse Lucy Diehl that the pt had recently been discharged.     DEEDEE AparicioBC (Music Therapist-Board Certified)  Spiritual Care Department  Referral-based service

## 2017-08-25 ENCOUNTER — OFFICE VISIT (OUTPATIENT)
Dept: SURGERY | Age: 42
End: 2017-08-25

## 2017-08-25 ENCOUNTER — TELEPHONE (OUTPATIENT)
Dept: SURGERY | Age: 42
End: 2017-08-25

## 2017-08-25 VITALS
RESPIRATION RATE: 18 BRPM | DIASTOLIC BLOOD PRESSURE: 73 MMHG | TEMPERATURE: 98.4 F | SYSTOLIC BLOOD PRESSURE: 115 MMHG | HEART RATE: 102 BPM | OXYGEN SATURATION: 100 %

## 2017-08-25 DIAGNOSIS — Z51.89 ENCOUNTER FOR WOUND CARE: ICD-10-CM

## 2017-08-25 DIAGNOSIS — Z09 SURGICAL FOLLOW-UP CARE: Primary | ICD-10-CM

## 2017-08-25 DIAGNOSIS — K63.2 ENTEROCUTANEOUS FISTULA: ICD-10-CM

## 2017-08-25 DIAGNOSIS — R10.84 GENERALIZED ABDOMINAL PAIN: ICD-10-CM

## 2017-08-25 NOTE — PROGRESS NOTES
Subjective: Erma Hsieh is a 46year old female that is status post Diagnostic laparoscopy converted to   exploratory laparotomy with lysis of adhesions on June 7, 2017 with Dr. Patrica Feliciano. Status post Exploratory laparotomy with extensive lysis of adhesions greater than 2 hours, repair of enterotomy with Dr. Annetta Fernandez on June 28, 2017. Status post  exploratory laparotomy with lysis of adhesions for 2 hours with   fistula exclusion with feeding tube placement on July 7, 2017. Patient long hospitalization and was discharged to [address] [place] [address] on yesterday. Patient comes in office today due to left upper quadrant drain out of wound. Stated was found to be out on yesterday evening and hasn't been draining. Patient comes in on stretcher from facility. Denies any fever, no chills, no shortness of breath, and no chest pain. Complaint of abdominal pain. Patient with mother today. Mother's stated that she notice that catheter isn't in right place. Patient with midline fistula, left upper abdomen drain, and left lower abdomen ostomy. Objective:     Blood pressure 115/73, pulse (!) 102, temperature 98.4 °F (36.9 °C), temperature source Oral, resp. rate 18, SpO2 100 %. Wound:  Location: Midline abdominal fistula with ostomy to drainage bag that is functioning and intact( this is to suction at facility)    Left lower quadrant with mucus fistula with wound manager. Presence of very little light yellow to clear drainage. Left upper quadrant with red rubber cath through external skin that feeds into fistula. The insertion of this catheter is suppose to be on the left margin of wound bed into proximal lumen of small intestine. This was had fallen out. With help of wound care nurse Red rubber fed back through  Skin insertion site back in proximal lumen. Once performed bilious output emptied promptly into drainage bag.     Wound care nurse re-dressed midline fistula with new fistula ostomy bag.  Patient tolerated well.         Assessment:     Wound evaluation, drain re-insertion, wound care to fistula. Plan:     1. Wound care discussed with patient and patient's mother. 2. Nutrition with TPN. 3. Patient back to [facility]. Continue with monitoring of ostomy drains. Will have phone follow up next week concerning wound care and nutrition. Patient verbalized understanding and questions were answered to the best of my knowledge and ability.  Advised if any questions or concerns to call the office

## 2017-08-25 NOTE — MR AVS SNAPSHOT
Visit Information Date & Time Provider Department Dept. Phone Encounter #  
 8/25/2017  2:00 PM Romel Serrano NP [place] [address] [phone] [id number] Upcoming Health Maintenance Date Due DTaP/Tdap/Td series (1 - Tdap) 12/15/1998 PAP AKA CERVICAL CYTOLOGY 12/15/1998 INFLUENZA AGE 9 TO ADULT 8/1/2017 Allergies as of 8/25/2017  Review Complete On: 8/25/2017 By: Romel Serrano NP Severity Noted Reaction Type Reactions Demerol [Meperidine]  08/15/2011    Unknown (comments) Soma [Carisoprodol]  05/04/2015    Rash, Nausea Only Sulfa (Sulfonamide Antibiotics)  08/15/2011    Unknown (comments) Toradol [Ketorolac Tromethamine]  08/15/2011    Unknown (comments) Current Immunizations  Reviewed on 5/4/2015 No immunizations on file. Not reviewed this visit You Were Diagnosed With   
  
 Codes Comments Surgical follow-up care    -  Primary ICD-10-CM: Q77 ICD-9-CM: V67.00 Enterocutaneous fistula     ICD-10-CM: M52.0 ICD-9-CM: 569.81 Encounter for wound care     ICD-10-CM: Z51.89 ICD-9-CM: V58.89 Generalized abdominal pain     ICD-10-CM: R10.84 ICD-9-CM: 789.07 Vitals BP Pulse Temp Resp SpO2 Smoking Status 115/73 (BP 1 Location: Other(comment), BP Patient Position: Supine) (!) 102 98.4 °F (36.9 °C) (Oral) 18 100% Former Smoker Preferred Pharmacy Pharmacy Name Phone [phone] [address] [phone] Your Updated Medication List  
  
   
This list is accurate as of: 8/25/17  4:24 PM.  Always use your most recent med list.  
  
  
  
  
 * albuterol 90 mcg/actuation inhaler Commonly known as:  PROVENTIL HFA, VENTOLIN HFA, PROAIR HFA Take 2 Puffs by inhalation every four (4) hours as needed for Wheezing. * albuterol sulfate SR 4 mg ER tablet Commonly known as:  PROVENTIL SR Take 4 mg by mouth daily as needed for Other (Wheezing).  Tries to alternate with albuterol MDI when she doesn't need rapid resolution of symptoms  
  
 aspirin 81 mg chewable tablet Take 1 Tab by mouth daily. BREO ELLIPTA 200-25 mcg/dose inhaler Generic drug:  fluticasone-vilanterol Take 1 Puff by inhalation daily. clopidogrel 75 mg Tab Commonly known as:  PLAVIX Take 1 Tab by mouth daily. HYDROmorphone (PF) 1 mg/mL injection Commonly known as:  DILAUDID  
1 mL by IntraVENous route every three (3) hours as needed (Breakthrough pain). Max Daily Amount: 8 mg. morphine CR 60 mg CR tablet Commonly known as:  MS CONTIN Take 1 Tab by mouth three (3) times daily. Max Daily Amount: 180 mg.  
  
 ondansetron 4 mg/2 mL Soln Commonly known as:  ZOFRAN  
4 mL by IntraVENous route every six (6) hours as needed. pantoprazole 40 mg tablet Commonly known as:  PROTONIX Take 1 Tab by mouth Daily (before breakfast). XANAX PO Take 1 mg by mouth three (3) times daily as needed. * Notice: This list has 2 medication(s) that are the same as other medications prescribed for you. Read the directions carefully, and ask your doctor or other care provider to review them with you. Introducing [facility]! Salvatore Stewart introduces [employer] patient portal. Now you can access parts of your medical record, email your doctor's office, and request medication refills online. 1. In your internet browser, go to [email or web address] 2. Click on the First Time User? Click Here link in the Sign In box. You will see the New Member Sign Up page. 3. Enter your [employer] Access Code exactly as it appears below. You will not need to use this code after youve completed the sign-up process. If you do not sign up before the expiration date, you must request a new code. · [employer] Access Code: [email or web address] Expires: 9/19/2017  8:55 AM 
 
4.  Enter the last four digits of your Social Security Number (xxxx) and Date of Birth (mm/dd/yyyy) as indicated and click Submit. You will be taken to the next sign-up page. 5. Create a [employer] ID. This will be your [employer] login ID and cannot be changed, so think of one that is secure and easy to remember. 6. Create a [employer] password. You can change your password at any time. 7. Enter your Password Reset Question and Answer. This can be used at a later time if you forget your password. 8. Enter your e-mail address. You will receive e-mail notification when new information is available in [address] 9. Click Sign Up. You can now view and download portions of your medical record. 10. Click the Download Summary menu link to download a portable copy of your medical information. If you have questions, please visit the Frequently Asked Questions section of the [employer] website. Remember, [employer] is NOT to be used for urgent needs. For medical emergencies, dial 911. Now available from your iPhone and Android! Please provide this summary of care documentation to your next provider. If you have any questions after today's visit, please call [phone].

## 2017-08-25 NOTE — TELEPHONE ENCOUNTER
I received a call from Jose Maria Lewis, RN, nurse at [facility] stating patient had just gotten to them yesterday and she is evaluating her tube for the first time. She states the patient has a red rubber catheter tube that goes thru the skin on patients side and into the wound. She states the patients mother told her yesterday at discharge from [facility] that the tube came out and the nurse pushed it back in. She states you can see the tip in the wound and the mother states that was not where it was supposed to be. She states she called Tyler Gillis, wound care nurse here at [facility] and she agreed, she did not think tube was in the correct position and suggested she call us. I spoke with Mary Jalloh NP who spoke with Dr. Kiya Fajardo who called and spoke with nurse Jose Maria Lewis directly. Decision was made to have patient come to the office as opposed to the ER for eval. Appointment was made for today with Mary Jalloh NP. Of note, discharge summary instructions say patient to follow up with Dr. Gerry Burnett when able.

## 2017-08-25 NOTE — PROGRESS NOTES
1. Have you been to the ER, urgent care clinic since your last visit? Hospitalized since your last visit? No    2. Have you seen or consulted any other health care providers outside of the [address] [place] [address] since your last visit? Include any pap smears or colon screening. No     Was just discharged yesterday from [address] to [facility], long term care facility.

## 2017-09-05 ENCOUNTER — TELEPHONE (OUTPATIENT)
Dept: SURGERY | Age: 42
End: 2017-09-05

## 2017-09-05 NOTE — TELEPHONE ENCOUNTER
The holes in the red tube is leaking. She wants to know if it can be switched out.  The patient is in the hospital ma

## 2017-09-06 ENCOUNTER — TELEPHONE (OUTPATIENT)
Dept: SURGERY | Age: 42
End: 2017-09-06

## 2017-09-12 ENCOUNTER — OFFICE VISIT (OUTPATIENT)
Dept: SURGERY | Age: 42
End: 2017-09-12

## 2017-09-12 VITALS
HEART RATE: 105 BPM | BODY MASS INDEX: 50.02 KG/M2 | HEIGHT: 64 IN | SYSTOLIC BLOOD PRESSURE: 132 MMHG | WEIGHT: 293 LBS | OXYGEN SATURATION: 98 % | DIASTOLIC BLOOD PRESSURE: 80 MMHG | TEMPERATURE: 98.1 F

## 2017-09-12 DIAGNOSIS — K63.2 ENTEROCUTANEOUS FISTULA: Primary | ICD-10-CM

## 2017-09-12 RX ORDER — CLONAZEPAM 1 MG/1
TABLET ORAL 2 TIMES DAILY
Status: ON HOLD | COMMUNITY
End: 2017-10-17

## 2017-09-12 NOTE — PROGRESS NOTES
1. Have you been to the ER, urgent care clinic since your last visit? Hospitalized since your last visit? No    2. Have you seen or consulted any other health care providers outside of the [address] [place] [address] since your last visit? Include any pap smears or colon screening.  No

## 2017-09-12 NOTE — PATIENT INSTRUCTIONS
Reason for Visit:   Follow-up EC fistula and wound care    Brief History:  45 yo woman with hx Crohn's disease s/p total colectomy with end ileostomy complicated by stricture and EC fistula presented for clinic for follow-up. Pt has been receiving wound care, TPN and PT at [address] [place] [address]. Wound appears to be healing well despite significant out from fistula. No fever or chills. Pain has been at issue for patient especially with wound care. Visit Vitals    /80 (BP 1 Location: Left arm, BP Patient Position: Sitting)    Pulse (!) 105    Temp 98.1 °F (36.7 °C) (Oral)    Ht 5' 4\" (1.626 m)    Wt 338 lb (153.3 kg)    SpO2 98%    BMI 58.02 kg/m2         Physical Exam:    Gen:  NAD  Pulm:  Unlabored  Abd:  S/obese/non distended/appropriate TTP  Abdominal wound:  Large midline wound with pink granulating base and exposed bowel. Enteric effluent drainage from wound left of midline. Moderate induration with minimal erythema at wound edge. No fluctuance     AP:  45 yo woman with EC fistula presented for follow-up wound check    - Abdominal wound:  Wound redressed with wound appliance in clinic. 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    - Continue TPN  - Follow-up in General Surgery clinic monthly  - Plan fistula takedown in January 2018.

## 2017-09-12 NOTE — PROGRESS NOTES
Reason for Visit:   Follow-up EC fistula and wound care    Brief History:  47 yo woman with hx Crohn's disease s/p total colectomy with end ileostomy complicated by stricture and EC fistula presented for clinic for follow-up. Pt has been receiving wound care, TPN and PT at [facility]. Wound appears to be healing well despite significant out from fistula. No fever or chills. Pain has been at issue for patient especially with wound care. Visit Vitals    /80 (BP 1 Location: Left arm, BP Patient Position: Sitting)    Pulse (!) 105    Temp 98.1 °F (36.7 °C) (Oral)    Ht 5' 4\" (1.626 m)    Wt 338 lb (153.3 kg)    SpO2 98%    BMI 58.02 kg/m2         Physical Exam:    Gen:  NAD  Pulm:  Unlabored  Abd:  S/obese/non distended/appropriate TTP  Abdominal wound:  Large midline wound with pink granulating base and exposed bowel. Enteric effluent drainage from wound left of midline. Moderate induration with minimal erythema at wound edge. No fluctuance     AP:  47 yo woman with EC fistula presented for follow-up wound check    - Abdominal wound:  Wound redressed with wound appliance in clinic. 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    - Continue TPN  - Follow-up in General Surgery clinic monthly  - Plan fistula takedown in January 2018.

## 2017-09-21 ENCOUNTER — TELEPHONE (OUTPATIENT)
Dept: SURGERY | Age: 42
End: 2017-09-21

## 2017-09-21 NOTE — TELEPHONE ENCOUNTER
I returned mothers phone call after having spoken with Russell Patel NP. Per Russell Patel, I told patients mother if staff physician there has evaluated patient and feels she needs to come to [facility], then that is what she should do. She states she thought Dr. Seema Doll" just wanted to talk with Dr. Tosin Bejarano. I told her Dr. Tosin Bejarano is seven days on and seven days off and is due to  at 5pm tomorrow night, but if he would like, he could talk with the on call physican Dr. Reji Pfeiffer. She states she will see if that is what he would like to do and let us know.

## 2017-10-03 ENCOUNTER — OFFICE VISIT (OUTPATIENT)
Dept: SURGERY | Age: 42
End: 2017-10-03

## 2017-10-03 VITALS
OXYGEN SATURATION: 98 % | TEMPERATURE: 98.2 F | RESPIRATION RATE: 16 BRPM | SYSTOLIC BLOOD PRESSURE: 107 MMHG | HEIGHT: 64 IN | HEART RATE: 93 BPM | DIASTOLIC BLOOD PRESSURE: 60 MMHG | WEIGHT: 293 LBS | BODY MASS INDEX: 50.02 KG/M2

## 2017-10-03 DIAGNOSIS — K63.2 ENTEROCUTANEOUS FISTULA: Primary | ICD-10-CM

## 2017-10-03 NOTE — PATIENT INSTRUCTIONS
Reason for Visit:  Follow-up abdominal wound and EC fistula    Brief History:  45 yo woman with hx Crohn's disease s/p total colectomy with end ileostomy, small bowel perforation s/p ex lap, ischemic bowel s/p ex lap, lysis of adhesions complicated by EC fistula presented to clinic for evaluation of abdominal wound. Pt reported she is getting some but is limited by food choice. Pt still have significant abdominal pain and is hoping to see her regular pain specialist doctor. No nausea or vomiting. No fever or chills. Pt has been ambulation more and is mobile. Wound care is being performed at [facility] with varying success with leakage but skin and wound edge appeared healthy. Visit Vitals    /60 (BP 1 Location: Left arm, BP Patient Position: Sitting)    Pulse 93    Temp 98.2 °F (36.8 °C) (Oral)    Resp 16    Ht 5' 4\" (1.626 m)    Wt 338 lb (153.3 kg)    SpO2 98%    BMI 58.02 kg/m2         Physical Exam:    Gen:  NAD  Pulm:  Unlabored  Abd:  S/ND/appropriate TTP  Wound:  C/D/I  Abdominal wound:  Wound with granulation tissue on inferior edge. EC fistula with semibilious output. Wound edge is clean and intact without excoriation or erythema. AP:  45 yo woman with Crohn's presented for follow-up visit to evaluate fistula    - EC fistula/abdominal wound:  Wound appears to be franco and healing well. Continue excellent wound care  - Diet:  As fistula is not going to close on its own, advance to regular diet as tolerated.   Monitor fistula for increased output if diet is advanced  - Follow-up in Clinic in December for wound check  - Will see patient in January to discuss fistula takedown operation    Tia Ballard MD

## 2017-10-03 NOTE — PROGRESS NOTES
Reason for Visit:  Follow-up abdominal wound and EC fistula    Brief History:  45 yo woman with hx Crohn's disease s/p total colectomy with end ileostomy, small bowel perforation s/p ex lap, ischemic bowel s/p ex lap, lysis of adhesions complicated by EC fistula presented to clinic for evaluation of abdominal wound. Pt reported she is getting some but is limited by food choice. Pt still have significant abdominal pain and is hoping to see her regular pain specialist doctor. No nausea or vomiting. No fever or chills. Pt has been ambulation more and is mobile. Wound care is being performed at [address] [place] [address] with varying success with leakage but skin and wound edge appeared healthy. Visit Vitals    /60 (BP 1 Location: Left arm, BP Patient Position: Sitting)    Pulse 93    Temp 98.2 °F (36.8 °C) (Oral)    Resp 16    Ht 5' 4\" (1.626 m)    Wt 338 lb (153.3 kg)    SpO2 98%    BMI 58.02 kg/m2         Physical Exam:    Gen:  NAD  Pulm:  Unlabored  Abd:  S/ND/appropriate TTP  Wound:  C/D/I  Abdominal wound:  Wound with granulation tissue on inferior edge. EC fistula with semibilious output. Wound edge is clean and intact without excoriation or erythema. AP:  45 yo woman with Crohn's presented for follow-up visit to evaluate fistula    - EC fistula/abdominal wound:  Wound appears to be franco and healing well. Continue excellent wound care  - Diet:  As fistula is not going to close on its own, advance to regular diet as tolerated.   Monitor fistula for increased output if diet is advanced  - Follow-up in Clinic in December for wound check  - Will see patient in January to discuss fistula takedown operation    Nikolay Fernandes MD

## 2017-10-15 ENCOUNTER — APPOINTMENT (OUTPATIENT)
Dept: CT IMAGING | Age: 42
DRG: 386 | End: 2017-10-15
Attending: EMERGENCY MEDICINE
Payer: MEDICARE

## 2017-10-15 ENCOUNTER — APPOINTMENT (OUTPATIENT)
Dept: GENERAL RADIOLOGY | Age: 42
DRG: 386 | End: 2017-10-15
Attending: EMERGENCY MEDICINE
Payer: MEDICARE

## 2017-10-15 ENCOUNTER — HOSPITAL ENCOUNTER (INPATIENT)
Age: 42
LOS: 11 days | Discharge: HOME HEALTH | DRG: 386 | End: 2017-10-27
Attending: EMERGENCY MEDICINE | Admitting: FAMILY MEDICINE
Payer: MEDICARE

## 2017-10-15 DIAGNOSIS — R10.84 ABDOMINAL PAIN, GENERALIZED: Primary | ICD-10-CM

## 2017-10-15 DIAGNOSIS — K50.018 CROHN'S DISEASE OF SMALL INTESTINE WITH OTHER COMPLICATION (HCC): ICD-10-CM

## 2017-10-15 DIAGNOSIS — R53.81 DEBILITY: ICD-10-CM

## 2017-10-15 DIAGNOSIS — D72.829 LEUKOCYTOSIS, UNSPECIFIED TYPE: ICD-10-CM

## 2017-10-15 DIAGNOSIS — K63.2 ENTEROCUTANEOUS FISTULA: ICD-10-CM

## 2017-10-15 DIAGNOSIS — R11.0 NAUSEA: ICD-10-CM

## 2017-10-15 DIAGNOSIS — N28.9 RENAL INSUFFICIENCY: ICD-10-CM

## 2017-10-15 DIAGNOSIS — F41.9 ANXIETY: ICD-10-CM

## 2017-10-15 DIAGNOSIS — E86.0 DEHYDRATION: ICD-10-CM

## 2017-10-15 DIAGNOSIS — E87.6 HYPOKALEMIA: ICD-10-CM

## 2017-10-15 DIAGNOSIS — E87.1 HYPONATREMIA: ICD-10-CM

## 2017-10-15 LAB
ALBUMIN SERPL-MCNC: 3.1 G/DL (ref 3.5–5)
ALBUMIN/GLOB SERPL: 0.5 {RATIO} (ref 1.1–2.2)
ALP SERPL-CCNC: 342 U/L (ref 45–117)
ALT SERPL-CCNC: 66 U/L (ref 12–78)
ANION GAP SERPL CALC-SCNC: 10 MMOL/L (ref 5–15)
APPEARANCE UR: ABNORMAL
AST SERPL-CCNC: 47 U/L (ref 15–37)
BACTERIA URNS QL MICRO: NEGATIVE /HPF
BASOPHILS # BLD: 0 K/UL (ref 0–0.1)
BASOPHILS NFR BLD: 0 % (ref 0–1)
BILIRUB SERPL-MCNC: 0.7 MG/DL (ref 0.2–1)
BILIRUB UR QL: NEGATIVE
BUN SERPL-MCNC: 94 MG/DL (ref 6–20)
BUN/CREAT SERPL: 42 (ref 12–20)
CALCIUM SERPL-MCNC: 10.6 MG/DL (ref 8.5–10.1)
CHLORIDE SERPL-SCNC: 93 MMOL/L (ref 97–108)
CO2 SERPL-SCNC: 23 MMOL/L (ref 21–32)
COLOR UR: ABNORMAL
CREAT SERPL-MCNC: 2.26 MG/DL (ref 0.55–1.02)
DIFFERENTIAL METHOD BLD: ABNORMAL
EOSINOPHIL # BLD: 0.7 K/UL (ref 0–0.4)
EOSINOPHIL NFR BLD: 3 % (ref 0–7)
EPITH CASTS URNS QL MICRO: ABNORMAL /LPF
ERYTHROCYTE [DISTWIDTH] IN BLOOD BY AUTOMATED COUNT: 15.8 % (ref 11.5–14.5)
GLOBULIN SER CALC-MCNC: 6 G/DL (ref 2–4)
GLUCOSE SERPL-MCNC: 114 MG/DL (ref 65–100)
GLUCOSE UR STRIP.AUTO-MCNC: NEGATIVE MG/DL
HCT VFR BLD AUTO: 35.3 % (ref 35–47)
HGB BLD-MCNC: 11.9 G/DL (ref 11.5–16)
HGB UR QL STRIP: ABNORMAL
KETONES UR QL STRIP.AUTO: NEGATIVE MG/DL
LACTATE SERPL-SCNC: 1 MMOL/L (ref 0.4–2)
LEUKOCYTE ESTERASE UR QL STRIP.AUTO: ABNORMAL
LYMPHOCYTES # BLD: 4.7 K/UL (ref 0.8–3.5)
LYMPHOCYTES NFR BLD: 20 % (ref 12–49)
MCH RBC QN AUTO: 28 PG (ref 26–34)
MCHC RBC AUTO-ENTMCNC: 33.7 G/DL (ref 30–36.5)
MCV RBC AUTO: 83.1 FL (ref 80–99)
METAMYELOCYTES NFR BLD MANUAL: 3 %
MONOCYTES # BLD: 1.6 K/UL (ref 0–1)
MONOCYTES NFR BLD: 7 % (ref 5–13)
MYELOCYTES NFR BLD MANUAL: 1 %
NEUTS BAND NFR BLD MANUAL: 2 % (ref 0–6)
NEUTS SEG # BLD: 15.5 K/UL (ref 1.8–8)
NEUTS SEG NFR BLD: 64 % (ref 32–75)
NITRITE UR QL STRIP.AUTO: NEGATIVE
PH UR STRIP: 5.5 [PH] (ref 5–8)
PLATELET # BLD AUTO: 387 K/UL (ref 150–400)
PLATELET COMMENTS,PCOM: ABNORMAL
POTASSIUM SERPL-SCNC: 3.1 MMOL/L (ref 3.5–5.1)
PROT SERPL-MCNC: 9.1 G/DL (ref 6.4–8.2)
PROT UR STRIP-MCNC: ABNORMAL MG/DL
RBC # BLD AUTO: 4.25 M/UL (ref 3.8–5.2)
RBC #/AREA URNS HPF: ABNORMAL /HPF (ref 0–5)
RBC MORPH BLD: ABNORMAL
SODIUM SERPL-SCNC: 126 MMOL/L (ref 136–145)
SP GR UR REFRACTOMETRY: 1.02 (ref 1–1.03)
UROBILINOGEN UR QL STRIP.AUTO: 0.2 EU/DL (ref 0.2–1)
WBC # BLD AUTO: 23.5 K/UL (ref 3.6–11)
WBC MORPH BLD: ABNORMAL
WBC URNS QL MICRO: ABNORMAL /HPF (ref 0–4)

## 2017-10-15 PROCEDURE — 96375 TX/PRO/DX INJ NEW DRUG ADDON: CPT

## 2017-10-15 PROCEDURE — 81001 URINALYSIS AUTO W/SCOPE: CPT | Performed by: EMERGENCY MEDICINE

## 2017-10-15 PROCEDURE — 96376 TX/PRO/DX INJ SAME DRUG ADON: CPT

## 2017-10-15 PROCEDURE — 74011250636 HC RX REV CODE- 250/636: Performed by: EMERGENCY MEDICINE

## 2017-10-15 PROCEDURE — 87040 BLOOD CULTURE FOR BACTERIA: CPT | Performed by: EMERGENCY MEDICINE

## 2017-10-15 PROCEDURE — 83605 ASSAY OF LACTIC ACID: CPT | Performed by: EMERGENCY MEDICINE

## 2017-10-15 PROCEDURE — 80053 COMPREHEN METABOLIC PANEL: CPT | Performed by: EMERGENCY MEDICINE

## 2017-10-15 PROCEDURE — 36415 COLL VENOUS BLD VENIPUNCTURE: CPT | Performed by: EMERGENCY MEDICINE

## 2017-10-15 PROCEDURE — 93005 ELECTROCARDIOGRAM TRACING: CPT

## 2017-10-15 PROCEDURE — 99285 EMERGENCY DEPT VISIT HI MDM: CPT

## 2017-10-15 PROCEDURE — 71010 XR CHEST PORT: CPT

## 2017-10-15 PROCEDURE — 96365 THER/PROPH/DIAG IV INF INIT: CPT

## 2017-10-15 PROCEDURE — 84703 CHORIONIC GONADOTROPIN ASSAY: CPT | Performed by: FAMILY MEDICINE

## 2017-10-15 PROCEDURE — 74176 CT ABD & PELVIS W/O CONTRAST: CPT

## 2017-10-15 PROCEDURE — 85025 COMPLETE CBC W/AUTO DIFF WBC: CPT | Performed by: EMERGENCY MEDICINE

## 2017-10-15 RX ORDER — HYDROMORPHONE HYDROCHLORIDE 1 MG/ML
2 INJECTION, SOLUTION INTRAMUSCULAR; INTRAVENOUS; SUBCUTANEOUS ONCE
Status: COMPLETED | OUTPATIENT
Start: 2017-10-15 | End: 2017-10-15

## 2017-10-15 RX ORDER — ONDANSETRON 2 MG/ML
8 INJECTION INTRAMUSCULAR; INTRAVENOUS
Status: COMPLETED | OUTPATIENT
Start: 2017-10-15 | End: 2017-10-15

## 2017-10-15 RX ORDER — VANCOMYCIN 2 GRAM/500 ML IN 0.9 % SODIUM CHLORIDE INTRAVENOUS
2000 ONCE
Status: COMPLETED | OUTPATIENT
Start: 2017-10-15 | End: 2017-10-17

## 2017-10-15 RX ORDER — SODIUM CHLORIDE 0.9 % (FLUSH) 0.9 %
5-10 SYRINGE (ML) INJECTION AS NEEDED
Status: DISCONTINUED | OUTPATIENT
Start: 2017-10-15 | End: 2017-10-27 | Stop reason: HOSPADM

## 2017-10-15 RX ORDER — LEVOFLOXACIN 5 MG/ML
750 INJECTION, SOLUTION INTRAVENOUS
Status: COMPLETED | OUTPATIENT
Start: 2017-10-15 | End: 2017-10-17

## 2017-10-15 RX ADMIN — HYDROMORPHONE HYDROCHLORIDE 2 MG: 1 INJECTION, SOLUTION INTRAMUSCULAR; INTRAVENOUS; SUBCUTANEOUS at 21:58

## 2017-10-15 RX ADMIN — HYDROMORPHONE HYDROCHLORIDE 2 MG: 1 INJECTION, SOLUTION INTRAMUSCULAR; INTRAVENOUS; SUBCUTANEOUS at 23:11

## 2017-10-15 RX ADMIN — ONDANSETRON 8 MG: 2 INJECTION INTRAMUSCULAR; INTRAVENOUS at 22:01

## 2017-10-15 RX ADMIN — LEVOFLOXACIN 750 MG: 5 INJECTION, SOLUTION INTRAVENOUS at 23:17

## 2017-10-15 NOTE — IP AVS SNAPSHOT
0377 [facility] Ramy Nixon 13 
[phone] Patient: 71[address] MRN: [id number] TPB:[id number] You are allergic to the following Allergen Reactions Demerol (Meperidine) Unknown (comments) Soma (Carisoprodol) Rash Nausea Only Sulfa (Sulfonamide Antibiotics) Unknown (comments) Toradol (Ketorolac Tromethamine) Unknown (comments) Recent Documentation Height Weight BMI OB Status Smoking Status 1.626 m 145.5 kg 55.06 kg/m2 Unknown Former Smoker Emergency Contacts  (Rel.) Home Phone Work Phone Mobile Phone R Caitlin Becerra 2 (Parent) [phone] About your hospitalization You were admitted on:  October 16, 2017 You last received care in the:  [facility] You were discharged on:  October 27, 2017 Why you were hospitalized Your primary diagnosis was:  Sirs (Systemic Inflammatory Response Syndrome) (Hcc) Your diagnoses also included:  Abdominal Pain, Acute Hyponatremia, Wound, Open, Anterior Abdominal Wall, Garrett (Acute Kidney Injury) (Hcc), Morbid Obesity (Hcc) Providers Seen During Your Hospitalization Provider Specialty Primary office phone Daryle Lamer, MD Emergency Medicine [phone] Taran Hutchins MD Family Practice [phone] Beronica Torres MD Internal Medicine [phone] Yamilka Herr MD Hospitalist [phone] Kyra Juarez MD Internal Medicine [phone] Your Primary Care Physician (PCP) Primary Care Physician Office Phone Office Fax Elvis Spencer [phone] Follow-up Information Follow up With Details Comments Contact Info Gaston Salas MD In 1 week GI; hospital follow up [address] [address] 
[phone] Cain Temple MD In 1 week GI; hospital follow up [address] [place] [address] [facility] [address] Gastrointestinal 300 [facility] [address] [phone] Edin Lu MD  as scheduled [address] 
[address] 
[phone] My Medications STOP taking these medications Clinimix E 5/D20 Sulfite Free 5 % Solp tpn infusion Generic drug:  amino acid 5% dextrose 20% w/ electrolytes DILAUDID 4 mg tablet Generic drug:  HYDROmorphone  
   
  
 heparin (porcine) 5,000 unit/mL Syrg  
   
  
 morphine (PF) in 0.9 % NaCl 1 mg/mL Soln  
   
  
 nystatin powder Commonly known as:  MYCOSTATIN  
   
  
 REMERON 15 mg tablet Generic drug:  mirtazapine ZOSYN 3.375 gram injection Generic drug:  piperacillin-tazobactam  
   
  
  
TAKE these medications as instructed Instructions Each Dose to Equal  
 Morning Noon Evening Bedtime  
 acetaminophen 325 mg tablet Commonly known as:  TYLENOL Your last dose was: Your next dose is: Take 650 mg by mouth every six (6) hours as needed for Pain. 650 mg  
    
   
   
   
  
 albuterol 2.5 mg /3 mL (0.083 %) nebulizer solution Commonly known as:  PROVENTIL VENTOLIN Your last dose was: Your next dose is:    
   
   
 2.5 mg by Nebulization route every four (4) hours as needed for Wheezing. 2.5 mg  
    
   
   
   
  
 aspirin 81 mg chewable tablet Your last dose was: Your next dose is: Take 1 Tab by mouth daily. 81 mg  
    
   
   
   
  
 BREO ELLIPTA 100-25 mcg/dose inhaler Generic drug:  fluticasone-vilanterol Your last dose was: Your next dose is: Take 1 Puff by inhalation daily. 1 Puff  
    
   
   
   
  
 clonazePAM 0.5 mg tablet Commonly known as:  Teresita Pelt Your last dose was: Your next dose is: Take 1 Tab by mouth two (2) times a day. Max Daily Amount: 1 mg. Indications: anxiety  0.5 mg  
    
   
   
   
  
 clopidogrel 75 mg Tab Commonly known as:  PLAVIX Your last dose was: Your next dose is: Take 1 Tab by mouth daily. 75 mg  
    
   
   
   
  
 cyanocobalamin 100 mcg tablet Commonly known as:  VITAMIN B12 Your last dose was: Your next dose is: Take 100 mcg by mouth daily. 100 mcg  
    
   
   
   
  
 fat emulsion 20% 20 % infusion Commonly known as:  LIPOSYN, INTRAlipid Your last dose was: Your next dose is:    
   
   
 500 mL by IntraVENous route every Monday, Wednesday, Friday. Indications: TPN  
 500 mL  
    
   
   
   
  
 insulin lispro 100 unit/mL injection Commonly known as:  HUMALOG Your last dose was: Your next dose is: As instructed on discharge instructions * morphine  mg CR tablet Commonly known as:  MS CONTIN Your last dose was: Your next dose is: Take 1 Tab by mouth every eight (8) hours. Max Daily Amount: 300 mg.  
 100 mg  
    
   
   
   
  
 * morphine IR 30 mg tablet Commonly known as:  MS IR Your last dose was: Your next dose is: Take 1 Tab by mouth every three (3) hours. Max Daily Amount: 240 mg.  
 30 mg  
    
   
   
   
  
 ondansetron 4 mg disintegrating tablet Commonly known as:  ZOFRAN ODT Your last dose was: Your next dose is: Take 1 Tab by mouth every eight (8) hours as needed for Nausea. 4 mg  
    
   
   
   
  
 pantoprazole 40 mg tablet Commonly known as:  PROTONIX Your last dose was: Your next dose is: Take 1 Tab by mouth Daily (before breakfast). 40 mg  
    
   
   
   
  
 predniSONE 20 mg tablet Commonly known as:  Zeke Green Your last dose was: Your next dose is: Take 1 Tab by mouth two (2) times a day. Indications: Crohn's Disease  20 mg  
    
   
   
   
  
 * Notice: This list has 2 medication(s) that are the same as other medications prescribed for you. Read the directions carefully, and ask your doctor or other care provider to review them with you. Where to Get Your Medications Information on where to get these meds will be given to you by the nurse or doctor. ! Ask your nurse or doctor about these medications  
  clonazePAM 0.5 mg tablet  
 fat emulsion 20% 20 % infusion  
 insulin lispro 100 unit/mL injection  
 morphine  mg CR tablet  
 morphine IR 30 mg tablet  
 ondansetron 4 mg disintegrating tablet  
 pantoprazole 40 mg tablet  
 predniSONE 20 mg tablet Discharge Instructions None Discharge Instructions Attachments/References PANTOPRAZOLE (BY MOUTH) (ENGLISH) Discharge Orders None [employer] Announcement We are excited to announce that we are making your provider's discharge notes available to you in [employer]. You will see these notes when they are completed and signed by the physician that discharged you from your recent hospital stay. If you have any questions or concerns about any information you see in [employer], please call the Health Information Department where you were seen or reach out to your Primary Care Provider for more information about your plan of care. Introducing [facility] SERVICES! Renetta Larson introduces [employer] patient portal. Now you can access parts of your medical record, email your doctor's office, and request medication refills online. 1. In your internet browser, go to [email or web address] 2. Click on the First Time User? Click Here link in the Sign In box. You will see the New Member Sign Up page. 3. Enter your [employer] Access Code exactly as it appears below. You will not need to use this code after youve completed the sign-up process. If you do not sign up before the expiration date, you must request a new code. · [employer] Access Code: [email or web address] Expires: 1/25/2018 11:46 AM 
 
4. Enter the last four digits of your Social Security Number (xxxx) and Date of Birth (mm/dd/yyyy) as indicated and click Submit. You will be taken to the next sign-up page. 5. Create a [employer] ID. This will be your [employer] login ID and cannot be changed, so think of one that is secure and easy to remember. 6. Create a [employer] password. You can change your password at any time. 7. Enter your Password Reset Question and Answer. This can be used at a later time if you forget your password. 8. Enter your e-mail address. You will receive e-mail notification when new information is available in [address] 9. Click Sign Up. You can now view and download portions of your medical record. 10. Click the Download Summary menu link to download a portable copy of your medical information. If you have questions, please visit the Frequently Asked Questions section of the [employer] website. Remember, [employer] is NOT to be used for urgent needs. For medical emergencies, dial 911. Now available from your iPhone and Android! General Information Please provide this summary of care documentation to your next provider. Patient Signature:  ____________________________________________________________ Date:  ____________________________________________________________  
  
Pam Artist Provider Signature:  ____________________________________________________________ Date:  ____________________________________________________________

## 2017-10-16 PROBLEM — S31.109A WOUND, OPEN, ANTERIOR ABDOMINAL WALL: Status: ACTIVE | Noted: 2017-10-16

## 2017-10-16 PROBLEM — D72.829 LEUKOCYTOSIS: Status: ACTIVE | Noted: 2017-10-16

## 2017-10-16 PROBLEM — R65.10 SIRS (SYSTEMIC INFLAMMATORY RESPONSE SYNDROME) (HCC): Status: ACTIVE | Noted: 2017-10-16

## 2017-10-16 PROBLEM — E87.1 ACUTE HYPONATREMIA: Status: ACTIVE | Noted: 2017-10-16

## 2017-10-16 PROBLEM — N17.9 AKI (ACUTE KIDNEY INJURY) (HCC): Status: ACTIVE | Noted: 2017-10-16

## 2017-10-16 LAB
ALBUMIN SERPL-MCNC: 2.7 G/DL (ref 3.5–5)
ALBUMIN/GLOB SERPL: 0.5 {RATIO} (ref 1.1–2.2)
ALP SERPL-CCNC: 297 U/L (ref 45–117)
ALT SERPL-CCNC: 56 U/L (ref 12–78)
ANION GAP SERPL CALC-SCNC: 10 MMOL/L (ref 5–15)
ANION GAP SERPL CALC-SCNC: 11 MMOL/L (ref 5–15)
AST SERPL-CCNC: 46 U/L (ref 15–37)
ATRIAL RATE: 113 BPM
BASOPHILS # BLD: 0 K/UL (ref 0–0.1)
BASOPHILS NFR BLD: 0 % (ref 0–1)
BILIRUB SERPL-MCNC: 0.8 MG/DL (ref 0.2–1)
BUN SERPL-MCNC: 64 MG/DL (ref 6–20)
BUN SERPL-MCNC: 79 MG/DL (ref 6–20)
BUN/CREAT SERPL: 42 (ref 12–20)
BUN/CREAT SERPL: 43 (ref 12–20)
CALCIUM SERPL-MCNC: 10 MG/DL (ref 8.5–10.1)
CALCIUM SERPL-MCNC: 10.5 MG/DL (ref 8.5–10.1)
CALCULATED P AXIS, ECG09: 80 DEGREES
CALCULATED R AXIS, ECG10: 69 DEGREES
CALCULATED T AXIS, ECG11: 42 DEGREES
CHLORIDE SERPL-SCNC: 104 MMOL/L (ref 97–108)
CHLORIDE SERPL-SCNC: 98 MMOL/L (ref 97–108)
CO2 SERPL-SCNC: 21 MMOL/L (ref 21–32)
CO2 SERPL-SCNC: 21 MMOL/L (ref 21–32)
CREAT SERPL-MCNC: 1.49 MG/DL (ref 0.55–1.02)
CREAT SERPL-MCNC: 1.88 MG/DL (ref 0.55–1.02)
CRP SERPL HS-MCNC: >9.5 MG/L
DIAGNOSIS, 93000: NORMAL
DIFFERENTIAL METHOD BLD: ABNORMAL
EOSINOPHIL # BLD: 0.4 K/UL (ref 0–0.4)
EOSINOPHIL NFR BLD: 2 % (ref 0–7)
ERYTHROCYTE [DISTWIDTH] IN BLOOD BY AUTOMATED COUNT: 15.7 % (ref 11.5–14.5)
GLOBULIN SER CALC-MCNC: 5.4 G/DL (ref 2–4)
GLUCOSE SERPL-MCNC: 106 MG/DL (ref 65–100)
GLUCOSE SERPL-MCNC: 124 MG/DL (ref 65–100)
HCG SERPL QL: NEGATIVE
HCT VFR BLD AUTO: 32.2 % (ref 35–47)
HGB BLD-MCNC: 10.9 G/DL (ref 11.5–16)
LYMPHOCYTES # BLD: 2.7 K/UL (ref 0.8–3.5)
LYMPHOCYTES NFR BLD: 14 % (ref 12–49)
MAGNESIUM SERPL-MCNC: 1.6 MG/DL (ref 1.6–2.4)
MCH RBC QN AUTO: 28.2 PG (ref 26–34)
MCHC RBC AUTO-ENTMCNC: 33.9 G/DL (ref 30–36.5)
MCV RBC AUTO: 83.2 FL (ref 80–99)
METAMYELOCYTES NFR BLD MANUAL: 5 %
MONOCYTES # BLD: 1.2 K/UL (ref 0–1)
MONOCYTES NFR BLD: 6 % (ref 5–13)
NEUTS BAND NFR BLD MANUAL: 2 % (ref 0–6)
NEUTS SEG # BLD: 14 K/UL (ref 1.8–8)
NEUTS SEG NFR BLD: 71 % (ref 32–75)
P-R INTERVAL, ECG05: 132 MS
PLATELET # BLD AUTO: 344 K/UL (ref 150–400)
POTASSIUM SERPL-SCNC: 2.7 MMOL/L (ref 3.5–5.1)
POTASSIUM SERPL-SCNC: 3.3 MMOL/L (ref 3.5–5.1)
PROT SERPL-MCNC: 8.1 G/DL (ref 6.4–8.2)
Q-T INTERVAL, ECG07: 378 MS
QRS DURATION, ECG06: 74 MS
QTC CALCULATION (BEZET), ECG08: 518 MS
RBC # BLD AUTO: 3.87 M/UL (ref 3.8–5.2)
RBC MORPH BLD: ABNORMAL
RBC MORPH BLD: ABNORMAL
SODIUM SERPL-SCNC: 130 MMOL/L (ref 136–145)
SODIUM SERPL-SCNC: 135 MMOL/L (ref 136–145)
VENTRICULAR RATE, ECG03: 113 BPM
WBC # BLD AUTO: 19.2 K/UL (ref 3.6–11)
WBC MORPH BLD: ABNORMAL

## 2017-10-16 PROCEDURE — 77030010540 HC BG OST DRN BMS -A

## 2017-10-16 PROCEDURE — 74011000250 HC RX REV CODE- 250: Performed by: INTERNAL MEDICINE

## 2017-10-16 PROCEDURE — 85025 COMPLETE CBC W/AUTO DIFF WBC: CPT | Performed by: FAMILY MEDICINE

## 2017-10-16 PROCEDURE — 74011000258 HC RX REV CODE- 258: Performed by: EMERGENCY MEDICINE

## 2017-10-16 PROCEDURE — 74011000258 HC RX REV CODE- 258: Performed by: NURSE PRACTITIONER

## 2017-10-16 PROCEDURE — 74011000250 HC RX REV CODE- 250: Performed by: FAMILY MEDICINE

## 2017-10-16 PROCEDURE — 74011250636 HC RX REV CODE- 250/636: Performed by: FAMILY MEDICINE

## 2017-10-16 PROCEDURE — 74011000250 HC RX REV CODE- 250: Performed by: NURSE PRACTITIONER

## 2017-10-16 PROCEDURE — 74011250636 HC RX REV CODE- 250/636: Performed by: HOSPITALIST

## 2017-10-16 PROCEDURE — 74011250636 HC RX REV CODE- 250/636

## 2017-10-16 PROCEDURE — A6154 WOUND POUCH EACH: HCPCS

## 2017-10-16 PROCEDURE — 83735 ASSAY OF MAGNESIUM: CPT | Performed by: FAMILY MEDICINE

## 2017-10-16 PROCEDURE — 77030032490 HC SLV COMPR SCD KNE COVD -B

## 2017-10-16 PROCEDURE — 77030005537 HC CATH URETH BARD -A

## 2017-10-16 PROCEDURE — 74011250636 HC RX REV CODE- 250/636: Performed by: NURSE PRACTITIONER

## 2017-10-16 PROCEDURE — 65660000000 HC RM CCU STEPDOWN

## 2017-10-16 PROCEDURE — 74011250636 HC RX REV CODE- 250/636: Performed by: INTERNAL MEDICINE

## 2017-10-16 PROCEDURE — 93970 EXTREMITY STUDY: CPT

## 2017-10-16 PROCEDURE — 80053 COMPREHEN METABOLIC PANEL: CPT | Performed by: FAMILY MEDICINE

## 2017-10-16 PROCEDURE — 77030011641 HC PASTE OST ADH BMS -A

## 2017-10-16 PROCEDURE — 77030018846 HC SOL IRR STRL H20 ICUM -A

## 2017-10-16 PROCEDURE — 74011250636 HC RX REV CODE- 250/636: Performed by: EMERGENCY MEDICINE

## 2017-10-16 PROCEDURE — 74011000258 HC RX REV CODE- 258: Performed by: FAMILY MEDICINE

## 2017-10-16 PROCEDURE — 36593 DECLOT VASCULAR DEVICE: CPT | Performed by: NURSE PRACTITIONER

## 2017-10-16 PROCEDURE — 36592 COLLECT BLOOD FROM PICC: CPT | Performed by: NURSE PRACTITIONER

## 2017-10-16 PROCEDURE — 83993 ASSAY FOR CALPROTECTIN FECAL: CPT | Performed by: NURSE PRACTITIONER

## 2017-10-16 PROCEDURE — 76450000000

## 2017-10-16 PROCEDURE — 36415 COLL VENOUS BLD VENIPUNCTURE: CPT | Performed by: FAMILY MEDICINE

## 2017-10-16 PROCEDURE — 80048 BASIC METABOLIC PNL TOTAL CA: CPT | Performed by: FAMILY MEDICINE

## 2017-10-16 PROCEDURE — 86141 C-REACTIVE PROTEIN HS: CPT | Performed by: NURSE PRACTITIONER

## 2017-10-16 RX ORDER — LEVOFLOXACIN 5 MG/ML
750 INJECTION, SOLUTION INTRAVENOUS
Status: DISCONTINUED | OUTPATIENT
Start: 2017-10-17 | End: 2017-10-16

## 2017-10-16 RX ORDER — SODIUM CHLORIDE 0.9 % (FLUSH) 0.9 %
5-10 SYRINGE (ML) INJECTION EVERY 8 HOURS
Status: DISCONTINUED | OUTPATIENT
Start: 2017-10-16 | End: 2017-10-27 | Stop reason: HOSPADM

## 2017-10-16 RX ORDER — SODIUM CHLORIDE 0.9 % (FLUSH) 0.9 %
5-10 SYRINGE (ML) INJECTION AS NEEDED
Status: DISCONTINUED | OUTPATIENT
Start: 2017-10-16 | End: 2017-10-27 | Stop reason: HOSPADM

## 2017-10-16 RX ORDER — HYDROMORPHONE HYDROCHLORIDE 1 MG/ML
1 INJECTION, SOLUTION INTRAMUSCULAR; INTRAVENOUS; SUBCUTANEOUS
Status: DISCONTINUED | OUTPATIENT
Start: 2017-10-16 | End: 2017-10-16

## 2017-10-16 RX ORDER — HYDROMORPHONE HYDROCHLORIDE 1 MG/ML
1 INJECTION, SOLUTION INTRAMUSCULAR; INTRAVENOUS; SUBCUTANEOUS ONCE
Status: COMPLETED | OUTPATIENT
Start: 2017-10-16 | End: 2017-10-16

## 2017-10-16 RX ORDER — ONDANSETRON 2 MG/ML
4 INJECTION INTRAMUSCULAR; INTRAVENOUS
Status: DISCONTINUED | OUTPATIENT
Start: 2017-10-16 | End: 2017-10-16

## 2017-10-16 RX ORDER — SODIUM CHLORIDE 9 MG/ML
125 INJECTION, SOLUTION INTRAVENOUS CONTINUOUS
Status: DISCONTINUED | OUTPATIENT
Start: 2017-10-16 | End: 2017-10-16

## 2017-10-16 RX ORDER — HYDROMORPHONE HCL IN 0.9% NACL 15 MG/30ML
PATIENT CONTROLLED ANALGESIA VIAL INTRAVENOUS
Status: DISCONTINUED | OUTPATIENT
Start: 2017-10-16 | End: 2017-10-25

## 2017-10-16 RX ORDER — HYDROMORPHONE HYDROCHLORIDE 1 MG/ML
1 INJECTION, SOLUTION INTRAMUSCULAR; INTRAVENOUS; SUBCUTANEOUS ONCE
Status: DISCONTINUED | OUTPATIENT
Start: 2017-10-16 | End: 2017-10-16

## 2017-10-16 RX ORDER — POTASSIUM CHLORIDE 29.8 MG/ML
20 INJECTION INTRAVENOUS
Status: COMPLETED | OUTPATIENT
Start: 2017-10-16 | End: 2017-10-16

## 2017-10-16 RX ORDER — VANCOMYCIN 1.75 GRAM/500 ML IN 0.9 % SODIUM CHLORIDE INTRAVENOUS
1750 EVERY 24 HOURS
Status: DISCONTINUED | OUTPATIENT
Start: 2017-10-17 | End: 2017-10-17

## 2017-10-16 RX ORDER — ONDANSETRON 2 MG/ML
4 INJECTION INTRAMUSCULAR; INTRAVENOUS EVERY 6 HOURS
Status: DISCONTINUED | OUTPATIENT
Start: 2017-10-16 | End: 2017-10-22

## 2017-10-16 RX ORDER — HYDROMORPHONE HYDROCHLORIDE 1 MG/ML
INJECTION, SOLUTION INTRAMUSCULAR; INTRAVENOUS; SUBCUTANEOUS
Status: COMPLETED
Start: 2017-10-16 | End: 2017-10-16

## 2017-10-16 RX ORDER — POTASSIUM CHLORIDE AND SODIUM CHLORIDE 900; 300 MG/100ML; MG/100ML
INJECTION, SOLUTION INTRAVENOUS CONTINUOUS
Status: DISCONTINUED | OUTPATIENT
Start: 2017-10-16 | End: 2017-10-17

## 2017-10-16 RX ORDER — HYDROMORPHONE HYDROCHLORIDE 1 MG/ML
1 INJECTION, SOLUTION INTRAMUSCULAR; INTRAVENOUS; SUBCUTANEOUS
Status: DISCONTINUED | OUTPATIENT
Start: 2017-10-16 | End: 2017-10-20

## 2017-10-16 RX ORDER — LEVOFLOXACIN 5 MG/ML
750 INJECTION, SOLUTION INTRAVENOUS EVERY 24 HOURS
Status: DISCONTINUED | OUTPATIENT
Start: 2017-10-16 | End: 2017-10-17

## 2017-10-16 RX ADMIN — HYDROMORPHONE HYDROCHLORIDE 1 MG: 1 INJECTION, SOLUTION INTRAMUSCULAR; INTRAVENOUS; SUBCUTANEOUS at 11:38

## 2017-10-16 RX ADMIN — Medication 10 ML: at 05:16

## 2017-10-16 RX ADMIN — HYDROMORPHONE HYDROCHLORIDE 1 MG: 1 INJECTION, SOLUTION INTRAMUSCULAR; INTRAVENOUS; SUBCUTANEOUS at 01:46

## 2017-10-16 RX ADMIN — SODIUM CHLORIDE AND POTASSIUM CHLORIDE: 9; 2.98 INJECTION, SOLUTION INTRAVENOUS at 18:48

## 2017-10-16 RX ADMIN — Medication 10 ML: at 23:09

## 2017-10-16 RX ADMIN — SODIUM CHLORIDE 4140 ML: 900 INJECTION, SOLUTION INTRAVENOUS at 07:35

## 2017-10-16 RX ADMIN — Medication 10 ML: at 13:23

## 2017-10-16 RX ADMIN — VANCOMYCIN HYDROCHLORIDE 2000 MG: 10 INJECTION, POWDER, LYOPHILIZED, FOR SOLUTION INTRAVENOUS at 02:10

## 2017-10-16 RX ADMIN — PROMETHAZINE HYDROCHLORIDE 12.5 MG: 25 INJECTION INTRAMUSCULAR; INTRAVENOUS at 07:15

## 2017-10-16 RX ADMIN — Medication: at 22:55

## 2017-10-16 RX ADMIN — HYDROMORPHONE HYDROCHLORIDE 1 MG: 1 INJECTION, SOLUTION INTRAMUSCULAR; INTRAVENOUS; SUBCUTANEOUS at 17:05

## 2017-10-16 RX ADMIN — ONDANSETRON 4 MG: 2 INJECTION INTRAMUSCULAR; INTRAVENOUS at 17:04

## 2017-10-16 RX ADMIN — HYDROMORPHONE HYDROCHLORIDE 1 MG: 1 INJECTION, SOLUTION INTRAMUSCULAR; INTRAVENOUS; SUBCUTANEOUS at 12:19

## 2017-10-16 RX ADMIN — POTASSIUM CHLORIDE 20 MEQ: 400 INJECTION, SOLUTION INTRAVENOUS at 08:50

## 2017-10-16 RX ADMIN — HYDROMORPHONE HYDROCHLORIDE 1 MG: 1 INJECTION, SOLUTION INTRAMUSCULAR; INTRAVENOUS; SUBCUTANEOUS at 23:24

## 2017-10-16 RX ADMIN — PIPERACILLIN SODIUM,TAZOBACTAM SODIUM 3.38 G: 3; .375 INJECTION, POWDER, FOR SOLUTION INTRAVENOUS at 14:06

## 2017-10-16 RX ADMIN — LEVOFLOXACIN 750 MG: 5 INJECTION, SOLUTION INTRAVENOUS at 13:23

## 2017-10-16 RX ADMIN — FAMOTIDINE 20 MG: 10 INJECTION, SOLUTION INTRAVENOUS at 14:06

## 2017-10-16 RX ADMIN — ONDANSETRON 4 MG: 2 INJECTION INTRAMUSCULAR; INTRAVENOUS at 08:49

## 2017-10-16 RX ADMIN — POTASSIUM CHLORIDE 20 MEQ: 400 INJECTION, SOLUTION INTRAVENOUS at 07:13

## 2017-10-16 RX ADMIN — FAMOTIDINE 20 MG: 10 INJECTION, SOLUTION INTRAVENOUS at 23:08

## 2017-10-16 RX ADMIN — PIPERACILLIN SODIUM,TAZOBACTAM SODIUM 3.38 G: 3; .375 INJECTION, POWDER, FOR SOLUTION INTRAVENOUS at 23:08

## 2017-10-16 RX ADMIN — SODIUM CHLORIDE 5 MG: 9 INJECTION INTRAMUSCULAR; INTRAVENOUS; SUBCUTANEOUS at 23:07

## 2017-10-16 RX ADMIN — SODIUM CHLORIDE 125 ML/HR: 900 INJECTION, SOLUTION INTRAVENOUS at 02:49

## 2017-10-16 RX ADMIN — ONDANSETRON 4 MG: 2 INJECTION INTRAMUSCULAR; INTRAVENOUS at 23:06

## 2017-10-16 RX ADMIN — ONDANSETRON 4 MG: 2 INJECTION INTRAMUSCULAR; INTRAVENOUS at 03:09

## 2017-10-16 RX ADMIN — Medication 10 ML: at 17:05

## 2017-10-16 RX ADMIN — SODIUM CHLORIDE 5 MG: 9 INJECTION INTRAMUSCULAR; INTRAVENOUS; SUBCUTANEOUS at 12:35

## 2017-10-16 RX ADMIN — PIPERACILLIN SODIUM,TAZOBACTAM SODIUM 3.38 G: 3; .375 INJECTION, POWDER, FOR SOLUTION INTRAVENOUS at 08:49

## 2017-10-16 RX ADMIN — ALTEPLASE 2 MG: 2.2 INJECTION, POWDER, LYOPHILIZED, FOR SOLUTION INTRAVENOUS at 03:19

## 2017-10-16 RX ADMIN — Medication: at 03:28

## 2017-10-16 RX ADMIN — PIPERACILLIN SODIUM,TAZOBACTAM SODIUM 3.38 G: 3; .375 INJECTION, POWDER, FOR SOLUTION INTRAVENOUS at 01:01

## 2017-10-16 NOTE — ED NOTES
TRANSFER - OUT REPORT:    Verbal report given to MAURI Huizar(name) on Nina Smith  being transferred to [facility](unit) for routine progression of care       Report consisted of patients Situation, Background, Assessment and   Recommendations(SBAR). Information from the following report(s) SBAR, ED Summary, Procedure Summary, Intake/Output and Recent Results was reviewed with the receiving nurse. Lines:   PICC Double Lumen 08/02/17 Right (Active)        Opportunity for questions and clarification was provided.       Patient transported with:   Registered Nurse

## 2017-10-16 NOTE — CONSULTS
Surgery Consult    Subjective: Diogo Grant is a 46 y.o. female who is being seen for evaluation of abdominal pain. She states that the pain is in her upper abdomen. It is on either side of her chronic wound. She says that it feels like her crohn's exacerbation. She is not currently on any medication for crohn's. She is currently in [facility]. Earlier this year she was operated on for a perforation from endoscopy. 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 She went back to the OR for attempted closure but this was unsuccessful. She was in the hospital for another month after this getting her wound under control. During this time she was on TPN and remains on it . She is currently on a regular diet. She has not been on any crohn's medication.            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    Abdominal pain 06/25/2017    Perforated bowel ([address] 06/06/2017    Right flank pain 08/22/2011    Crohn's disease ([address] 08/15/2011    DVT (deep venous thrombosis) ([address] 08/15/2011    Incarcerated ventral hernia 08/15/2011     Past Medical History:   Diagnosis Date    Crohn's disease ([address] 8/15/2011    DVT (deep venous thrombosis) ([address] 8/15/2011    Incarcerated ventral hernia 8/15/2011    Right flank pain 8/22/2011    Seizures ([address]     Stroke [facility])       Past Surgical History:   Procedure Laterality Date    HX OTHER SURGICAL      multiple procedures related to her Crohn's disease    TX LAP, INCISIONAL HERNIA REPAIR,INCARCERATED  9-10-08    dr. Lia Richter      Social History   Substance Use Topics    Smoking status: Former Smoker    Smokeless tobacco: Not on file    Alcohol use No      Family History   Problem Relation Age of Onset    Hypertension Father     Stroke Father     Other Father      arthritis      Current Facility-Administered Medications   Medication Dose Route Frequency    sodium chloride (NS) flush 5-10 mL  5-10 mL IntraVENous PRN    piperacillin-tazobactam (ZOSYN) 3.375 g in 0.9% sodium chloride (MBP/ADV) 100 mL  3.375 g IntraVENous NOW    levoFLOXacin (LEVAQUIN) 750 mg in D5W IVPB  750 mg IntraVENous NOW    vancomycin (VANCOCIN) 2000 mg in  ml infusion  2,000 mg IntraVENous ONCE     Current Outpatient Prescriptions   Medication Sig    clonazePAM (KLONOPIN) 1 mg tablet Take  by mouth two (2) times a day.  morphine CR (MS CONTIN) 60 mg CR tablet Take 1 Tab by mouth three (3) times daily. Max Daily Amount: 180 mg.    HYDROmorphone, PF, (DILAUDID) 1 mg/mL injection 1 mL by IntraVENous route every three (3) hours as needed (Breakthrough pain). Max Daily Amount: 8 mg.  aspirin 81 mg chewable tablet Take 1 Tab by mouth daily.  clopidogrel (PLAVIX) 75 mg tab Take 1 Tab by mouth daily.  pantoprazole (PROTONIX) 40 mg tablet Take 1 Tab by mouth Daily (before breakfast).  ondansetron (ZOFRAN) 4 mg/2 mL soln 4 mL by IntraVENous route every six (6) hours as needed.  albuterol sulfate SR (PROVENTIL SR) 4 mg ER tablet Take 4 mg by mouth daily as needed for Other (Wheezing). Tries to alternate with albuterol MDI when she doesn't need rapid resolution of symptoms    fluticasone-vilanterol (BREO ELLIPTA) 200-25 mcg/dose inhaler Take 1 Puff by inhalation daily.  albuterol (PROVENTIL HFA, VENTOLIN HFA, PROAIR HFA) 90 mcg/actuation inhaler Take 2 Puffs by inhalation every four (4) hours as needed for Wheezing.  ALPRAZOLAM (XANAX PO) Take 1 mg by mouth three (3) times daily as needed.       Allergies   Allergen Reactions    Demerol [Meperidine] Unknown (comments)    Soma [Carisoprodol] Rash and Nausea Only    Sulfa (Sulfonamide Antibiotics) Unknown (comments)    Toradol [Ketorolac Tromethamine] Unknown (comments)       Review of Systems:    Constitutional: positive for chills  Eyes: negative  Ears, Nose, Mouth, Throat, and Face: negative  Respiratory: negative  Cardiovascular: negative  Gastrointestinal: positive for abdominal pain  Genitourinary:positive for vaginal sore   Integument/Breast: negative  Hematologic/Lymphatic: negative  Musculoskeletal:negative  Neurological: negative  Behavioral/Psychiatric: negative    Objective:        Visit Vitals    /43 (BP 1 Location: Left arm, BP Patient Position: At rest)    Pulse (!) 128    Temp 98.1 °F (36.7 °C)    Resp 20    Ht 5' 4\" (1.626 m)    Wt 304 lb 5 oz (138 kg)    SpO2 100%    BMI 52.24 kg/m2       Physical Exam:  GENERAL: alert, cooperative, no distress, appears stated age, EYE: negative, THROAT & NECK: normal, LUNG: clear to auscultation bilaterally, HEART:tachycardic  rate and rhythm, ABDOMEN: soft  There is tenderness along both sides of her abdominal wound. The wound has an appliance on it and it appears to be well controlled. There is a LLQ ,ostomy. EXTREMITIES:  no edema, SKIN: multiple bruises. , NEUROLOGIC: negative, PSYCH: non focal    Imaging:  images and reports reviewed  CT- . No nephrolithiasis or hydronephrosis. 2. Large anterior abdominal wound, and a site of previous/known enterocutaneous  fistula though this is difficult to assess given lack of oral and IV contrast  material. Mild stranding around the anterior abdominal wound as well as in the  abdominal mesentery. Nonspecific wall thickening/inflammatory appearance of the  duodenum. Lab/Data Review: All lab results for the last 24 hours reviewed.   Recent Results (from the past 24 hour(s))   CBC WITH AUTOMATED DIFF    Collection Time: 10/15/17  9:39 PM   Result Value Ref Range    WBC 23.5 (H) 3.6 - 11.0 K/uL    RBC 4.25 3.80 - 5.20 M/uL    HGB 11.9 11.5 - 16.0 g/dL    HCT 35.3 35.0 - 47.0 %    MCV 83.1 80.0 - 99.0 FL    MCH 28.0 26.0 - 34.0 PG    MCHC 33.7 30.0 - 36.5 g/dL    RDW 15.8 (H) 11.5 - 14.5 %    PLATELET 142 919 - 019 K/uL    NEUTROPHILS 64 32 - 75 %    BAND NEUTROPHILS 2 0 - 6 %    LYMPHOCYTES 20 12 - 49 %    MONOCYTES 7 5 - 13 %    EOSINOPHILS 3 0 - 7 %    BASOPHILS 0 0 - 1 %    METAMYELOCYTES 3 (H) 0 %    MYELOCYTES 1 (H) 0 %    ABS. NEUTROPHILS 15.5 (H) 1.8 - 8.0 K/UL    ABS. LYMPHOCYTES 4.7 (H) 0.8 - 3.5 K/UL    ABS. MONOCYTES 1.6 (H) 0.0 - 1.0 K/UL    ABS. EOSINOPHILS 0.7 (H) 0.0 - 0.4 K/UL    ABS. BASOPHILS 0.0 0.0 - 0.1 K/UL    DF MANUAL      PLATELET COMMENTS LARGE PLATELETS      RBC COMMENTS ANISOCYTOSIS  1+        WBC COMMENTS REACTIVE LYMPHS     METABOLIC PANEL, COMPREHENSIVE    Collection Time: 10/15/17  9:39 PM   Result Value Ref Range    Sodium 126 (L) 136 - 145 mmol/L    Potassium 3.1 (L) 3.5 - 5.1 mmol/L    Chloride 93 (L) 97 - 108 mmol/L    CO2 23 21 - 32 mmol/L    Anion gap 10 5 - 15 mmol/L    Glucose 114 (H) 65 - 100 mg/dL    BUN 94 (H) 6 - 20 MG/DL    Creatinine 2.26 (H) 0.55 - 1.02 MG/DL    BUN/Creatinine ratio 42 (H) 12 - 20      GFR est AA 29 (L) >60 ml/min/1.73m2    GFR est non-AA 24 (L) >60 ml/min/1.73m2    Calcium 10.6 (H) 8.5 - 10.1 MG/DL    Bilirubin, total 0.7 0.2 - 1.0 MG/DL    ALT (SGPT) 66 12 - 78 U/L    AST (SGOT) 47 (H) 15 - 37 U/L    Alk.  phosphatase 342 (H) 45 - 117 U/L    Protein, total 9.1 (H) 6.4 - 8.2 g/dL    Albumin 3.1 (L) 3.5 - 5.0 g/dL    Globulin 6.0 (H) 2.0 - 4.0 g/dL    A-G Ratio 0.5 (L) 1.1 - 2.2     LACTIC ACID    Collection Time: 10/15/17  9:39 PM   Result Value Ref Range    Lactic acid 1.0 0.4 - 2.0 MMOL/L   EKG, 12 LEAD, INITIAL    Collection Time: 10/15/17  9:48 PM   Result Value Ref Range    Ventricular Rate 113 BPM    Atrial Rate 113 BPM    P-R Interval 132 ms    QRS Duration 74 ms    Q-T Interval 378 ms    QTC Calculation (Bezet) 518 ms    Calculated P Axis 80 degrees    Calculated R Axis 69 degrees    Calculated T Axis 42 degrees    Diagnosis Sinus tachycardia  Nonspecific T wave abnormality  No previous ECGs available     URINALYSIS W/ RFLX MICROSCOPIC    Collection Time: 10/15/17 11:02 PM   Result Value Ref Range    Color YELLOW/STRAW      Appearance CLOUDY (A) CLEAR      Specific gravity 1.019 1.003 - 1.030      pH (UA) 5.5 5.0 - 8.0      Protein TRACE (A) NEG mg/dL    Glucose NEGATIVE  NEG mg/dL    Ketone NEGATIVE  NEG mg/dL    Bilirubin NEGATIVE  NEG      Blood MODERATE (A) NEG      Urobilinogen 0.2 0.2 - 1.0 EU/dL    Nitrites NEGATIVE  NEG      Leukocyte Esterase MODERATE (A) NEG      WBC 5-10 0 - 4 /hpf    RBC 5-10 0 - 5 /hpf    Epithelial cells FEW FEW /lpf    Bacteria NEGATIVE  NEG /hpf         Assessment:   Abdominal pain- Suspect Crohn's exacerbation with mild renal failure    Plan:   NO Need for emergent operative intervention   Continue wound care  Would obtain GI consult in AM for possible Crohn's excerebration  ABX and ARF per medical service.      Signed By: Laina Wheeler MD     October 15, 2017

## 2017-10-16 NOTE — ROUTINE PROCESS
Bedside and Verbal shift change report given to Melchor Nazario (oncoming nurse) by Radha Gracia (offgoing nurse). Report included the following information SBAR, Kardex, Intake/Output, MAR, Recent Results, Cardiac Rhythm Sinus Tach and Alarm Parameters .

## 2017-10-16 NOTE — PROGRESS NOTES
TRANSFER - IN REPORT:    Verbal report received from Myranda Bansal RN(name) on [facility]  being received from ICU(unit) for routine progression of care      Report consisted of patients Situation, Background, Assessment and   Recommendations(SBAR). Information from the following report(s) SBAR, Kardex, ED Summary, Intake/Output, MAR and Recent Results was reviewed with the receiving nurse. Opportunity for questions and clarification was provided. Assessment completed upon patients arrival to unit and care assumed. Patient unable to fully turn over due to significant pain, will attempt to do a dual skin assessment once patient is more comfortable.

## 2017-10-16 NOTE — PROGRESS NOTES
TRANSFER - OUT REPORT:    Verbal report given on Isis Bridges  being transferred to [facility] (unit) for routine progression of care       Report consisted of patients Situation, Background, Assessment and   Recommendations(SBAR). Information from the following report(s) SBAR, Kardex, Intake/Output, MAR and Recent Results was reviewed with the receiving nurse. Lines:   PICC Double Lumen 08/02/17 Right (Active)   Central Line Being Utilized Yes 10/16/2017 12:00 PM   Criteria for Appropriate Use Limited/no vessel suitable for conventional peripheral access 10/16/2017 12:00 PM   Site Assessment Clean, dry, & intact 10/16/2017 12:00 PM   Phlebitis Assessment 0 10/16/2017 12:00 PM   Infiltration Assessment 0 10/16/2017 12:00 PM   Date of Last Dressing Change 10/09/17 10/16/2017 12:00 PM   Dressing Status Clean, dry, & intact 10/16/2017 12:00 PM   Action Taken Open ports on tubing capped 10/16/2017 12:00 PM   Dressing Type Disk with Chlorhexadine gluconate (CHG); Transparent 10/16/2017 12:00 PM   Hub Color/Line Status White; Infusing 10/16/2017 12:00 PM   Positive Blood Return (Site #1) Yes 10/16/2017  4:15 AM   Hub Color/Line Status Purple; Infusing 10/16/2017 12:00 PM   Positive Blood Return (Site #2) No 10/16/2017  4:15 AM   Alcohol Cap Used Yes 10/16/2017 12:00 PM        Opportunity for questions and clarification was provided.       Patient transported with:   Monitor  Registered Nurse  Tech

## 2017-10-16 NOTE — WOUND CARE
WOCN Note:   New consult placed by RN for abdominal wound/fistula management; Chart review revealed:   Admitted for abd pain; history of crohns, DVT, ventral hernia repair, perforated bowel, small bowel obstruction, enterocutaneous fistula; Surgery following [place]  Admitted from [address] [place] [address]:   Patient is alert, verbal with mother at bedside;   Bed: total care bariatric;  Patient has a woodruff catheter;   Diet: NPO  Patient managing pain with PCA; Bilateral heel, buttocks, and sacral skin intact and without erythema. Palpable DP pulses bilaterally. 1. POA midline abdominal wound with enterocutaneous fistula approximately 6cm x 6cm x 1cm with 3 active pseudostomas draining dark liquid drainage; Dr. Edel Swenson in to assess during eakins pouch change procedure and placement of red rubber catheter in wound for pulling drainage to suction canister and keeping drainage from pooling in wound base;   2. POA left abdominal ostomy appliance changed; stoma red, moist, with small amount of tan mucous; cut to fit one piece active life ostomy appliance to fit around stoma; Patient repositioned with assist from [address] [employer] [facility] for patient today; turned from supine to left on total care sport bed with pillows to offload bony prominences   Heels offloaded on pillow. Skin/wound treatment Recommendations:    Weekly and as needed remove eakins pouch; clean wound and periwound skin with normal saline, apply marathon to periwound skin; cut to fit eakins pouch to fit around wound; place stomahesive paste in crease at 3, 6, and 9 O'clock; apply paste around inside edge of open area on eakins pouch; apply around wound and secure with transparent dressing then seal with all care skin prep;     Skin Care & Pressure Injury Prevention Recommendations:  1. Minimize layers of linen/pads under patient to optimize support surface. 2.  Reposition patient approximately every 2 hours;  3.   Float heels with pillow under calves. 4.  Continue on total care sport bed;    5. Assess/protect skin in contact with medical devices. Keep skin moisturized;   6. Ensure that patient is repositioning in chair shifting weight approximately every 15 minutes and standing up every hour.      Discussed above assessment and recommendations with Dorian Ni (RN) and Dr. Arcelia Eric;     Transition of Care: Plan to follow weekly and as needed while admitted to hospital.        SONY Alberts, RN, [facility]  Certified Wound, Ostomy, Continence Nurse  office [phone]  pager [phone]   Yesica Rush, [address] [facility]

## 2017-10-16 NOTE — CONSULTS
Palliative Medicine Consult  Heath: [phone] ([phone])    Patient Name: Teresita Ospina  YOB: 1975    Date of Initial Consult: 10/16/17  Reason for Consult: pain management  Requesting Provider: Dr. Filipe Toney  Primary Care Physician: Eliezer Vasques MD      SUMMARY:   Teresita Ospina is a 46 y.o. with a past history of Crohn's disease, multiple surgeries s/p total colectomy with end ileostomy, 6/7/17 exp lap w SB perf, 6/28/17 exp lap, lysis of adhesions, repair of enterotomy, SMA stent on L, 7/16/17 exp lap, lysis of adhesions, fistula exclusion with feeding tube placement, frozen abdomen/ wound vac assisted closure placement, prolonged TPN, MRSA wound infection, awaiting repair of EC fistula planned for Jan 2018 if stable for surgery, who was admitted on 10/15/2017 from [facility] with a diagnosis of acute on chronic abdominal pain. Current medical issues leading to Palliative Medicine involvement include: pain management. Patient's mother, Yong Al is NOK   PALLIATIVE DIAGNOSES:   1. Acute on chronic abdominal pain ? Reason for increased pain. 2. Crohn's disease ? Flare  3. Chronic anxiety and depression, chronic benzo use  4. Debility, generalized muscle wasting  5. Nausea- multifactorial       PLAN:   1. Pain - unclear etiology to change in pain. Patient and mom report that pain meds had not recently been changed at [facility]  1. Will hold long acting meds for now since she has nausea/vomiting. Stop MS Contin 60 Q8  2. Dilaudid PCA 0.2mg/hr basal rate, with 0.4mg Q10min (4 hour lockout changed to 6mg)  3. Dilaudid 1mg IV Q3hr PRN pain not controlled by PCA  2. Nausea, may have some element of opioid withdrawal symptoms due to transition/ change in medications/ NPO status  1. Compazine 5mg IV Q8hrs scheduled  2. zofran 4mg IV Q6hrs scheduled  3. Initial consult note routed to primary continuity provider  4.  Communicated plan of care with: Palliative IDT       GOALS OF CARE / TREATMENT PREFERENCES:   [====Goals of Care====]  GOALS OF CARE:  Patient / health care proxy stated goals: (pain management)  Goals not reviewed today        TREATMENT PREFERENCES:   Code Status: Full Code    Advance Care Planning:  Advance Care Planning 10/16/2017   Patient's Healthcare Decision Maker is:  Next Cirilo Jiménez   Primary Decision Maker Name Coreen Perez   Primary Decision Maker Phone Number [phone]   Primary Decision Maker Relationship to Patient Parent   Confirm Advance Directive None   Patient Would Like to Complete Advance Directive -       Other:    The palliative care team has discussed with patient / health care proxy about goals of care / treatment preferences for patient.  [====Goals of Care====]         HISTORY:     History obtained from: chart, mom, patient    CHIEF COMPLAINT: admitted with uncontrolled abdominal pain    HPI/SUBJECTIVE:    The patient is:   [x] Verbal and participatory  [] Non-participatory due to:   46year old female who is known to our service from previous admission. She reports one day of acute change in abdominal pain. She had been doing \"ok\" and was planning on seeing her pain management doctor on Wednesday of this week for recommendations about weaning. She doesn't think Kurt Wadsworth had changed anything recently with her pain meds. She was on 60 Q8 of MS contin, morphine PCA (1mg for a while, most recently 0.5mg PCA dose) and PRN dilaudid 1mg IV . At one point they tried getting her off the PCA, but she didn't tolerate that well because it was too many pills for her to digest, so PCA had been restarted. Clinical Pain Assessment (nonverbal scale for severity on nonverbal patients):   [++++ Clinical Pain Assessment++++]  [++++Pain Severity++++]: Pain: 7  [++++Pain Character++++]: stabbing  [++++Pain Duration++++]:   Bad for the last 24-48 hours  [++++Pain Effect++++]: nauseated  [++++Pain Factors++++]:   [++++Pain Frequency++++]: constant  [++++Pain Location++++]: all over abdomen  [++++ Clinical Pain Assessment++++]  Duration: for how long has pt been experiencing pain (e.g., 2 days, 1 month, years)  Frequency: how often pain is an issue (e.g., several times per day, once every few days, constant)     FUNCTIONAL ASSESSMENT:     Palliative Performance Scale (PPS):  PPS: 20       PSYCHOSOCIAL/SPIRITUAL SCREENING:     Advance Care Planning:  Advance Care Planning 10/16/2017   Patient's Healthcare Decision Maker is: Legal Next of Camilo Jiménez   Primary Decision Maker Name Minna Bolden   Primary [address]  Phone Number [phone]   Primary Decision Maker Relationship to Patient Parent   Confirm Advance Directive None   Patient Would Like to Complete Advance Directive -        Any spiritual / [employer] concerns:  [] Yes /  [x] No    Caregiver Burnout:  [] Yes /  [x] No /  [] No Caregiver Present      Anticipatory grief assessment:   [x] Normal  / [] Maladaptive       ESAS Anxiety: Anxiety: 5    ESAS Depression: Depression: 5        REVIEW OF SYSTEMS:     Positive and pertinent negative findings in ROS are noted above in HPI. The following systems were [x] reviewed / [] unable to be reviewed as noted in HPI  Other findings are noted below. Systems: constitutional, ears/nose/mouth/throat, respiratory, gastrointestinal, genitourinary, musculoskeletal, integumentary, neurologic, psychiatric, endocrine. Positive findings noted below. Modified ESAS Completed by: provider   Fatigue: 10 Drowsiness: 2   Depression: 5 Pain: 7   Anxiety: 5 Nausea: 8   Anorexia: 8 Dyspnea: 0     Constipation: No              PHYSICAL EXAM:     From RN flowsheet:  Wt Readings from Last 3 Encounters:   10/16/17 305 lb 12.5 oz (138.7 kg)   10/03/17 338 lb (153.3 kg)   09/12/17 338 lb (153.3 kg)     Blood pressure (P) 129/64, pulse (!) (P) 110, temperature (P) 97.7 °F (36.5 °C), resp. rate (P) 18, height 5' 4\" (1.626 m), weight 305 lb 12.5 oz (138.7 kg), SpO2 (P) 99 %.     Pain Scale 1: Numeric (0 - 10)  Pain Intensity 1: 9  Pain Onset 1: acute  Pain Location 1: Abdomen  Pain Orientation 1: Upper, Anterior  Pain Description 1: Shooting, Stabbing, Aching  Pain Intervention(s) 1: Medication (see MAR)  Last bowel movement, if known:     Constitutional: pt is awake, alert, chronically ill appearing  Tearful anxious affect  No respiratory distress  Dressings not removed for exam           HISTORY:     Principal Problem:    SIRS (systemic inflammatory response syndrome) (HCC) (10/16/2017)    Active Problems:    Abdominal pain (6/25/2017)      Acute hyponatremia (10/16/2017)      Leukocytosis (10/16/2017)      Wound, open, anterior abdominal wall (10/16/2017)      DEAN (acute kidney injury) ([facility] (10/16/2017)      Past Medical History:   Diagnosis Date    Crohn's disease ([facility] [address] 8/15/2011    DVT (deep venous thrombosis) ([facility] [address] 8/15/2011    Incarcerated ventral hernia 8/15/2011    Right flank pain 8/22/2011    Seizures ([facility] [address]     Stroke [facility])       Past Surgical History:   Procedure Laterality Date    HX OTHER SURGICAL      multiple procedures related to her Crohn's disease    [place] LAP, INCISIONAL HERNIA REPAIR,INCARCERATED  9-10-08    dr. Dean Mcneil      Family History   Problem Relation Age of Onset    Hypertension Father     Stroke Father     Other Father      arthritis      History reviewed, no pertinent family history.   Social History   Substance Use Topics    Smoking status: Former Smoker    Smokeless tobacco: Not on file    Alcohol use No     Allergies   Allergen Reactions    Demerol [Meperidine] Unknown (comments)    Soma [Carisoprodol] Rash and Nausea Only    Sulfa (Sulfonamide Antibiotics) Unknown (comments)    Toradol [Ketorolac Tromethamine] Unknown (comments)      Current Facility-Administered Medications   Medication Dose Route Frequency    sodium chloride (NS) flush 5-10 mL  5-10 mL IntraVENous Q8H    sodium chloride (NS) flush 5-10 mL  5-10 mL IntraVENous PRN    HYDROmorphone (PF) 15 mg/30 ml (DILAUDID) PCA IntraVENous TITRATE    sodium chloride (NS) flush 5-10 mL  5-10 mL IntraVENous PRN    piperacillin-tazobactam (ZOSYN) 3.375 g in 0.9% sodium chloride (MBP/ADV) 100 mL  3.375 g IntraVENous Q8H    TPN ADULT - CENTRAL   IntraVENous CONTINUOUS    Vancomycin- pharmacy to dose   Other Rx Dosing/Monitoring    [START ON 10/17/2017] vancomycin (VANCOCIN) 1750 mg in  ml infusion  1,750 mg IntraVENous Q24H    prochlorperazine (COMPAZINE) with saline injection 5 mg  5 mg IntraVENous Q8H    levoFLOXacin (LEVAQUIN) 750 mg in D5W IVPB  750 mg IntraVENous Q24H    famotidine (PF) (PEPCID) 20 mg in sodium chloride 0.9 % 10 mL injection  20 mg IntraVENous Q12H    0.9% sodium chloride with KCl 40 mEq/L infusion   IntraVENous CONTINUOUS    ondansetron (ZOFRAN) injection 4 mg  4 mg IntraVENous Q6H    HYDROmorphone (PF) (DILAUDID) injection 1 mg  1 mg IntraVENous Q3H PRN    sodium chloride (NS) flush 5-10 mL  5-10 mL IntraVENous PRN          LAB AND IMAGING FINDINGS:     Lab Results   Component Value Date/Time    WBC 19.2 10/16/2017 05:18 AM    HGB 10.9 10/16/2017 05:18 AM    PLATELET 015 57/00/3534 05:18 AM     Lab Results   Component Value Date/Time    Sodium 135 10/16/2017 11:34 AM    Potassium 3.3 10/16/2017 11:34 AM    Chloride 104 10/16/2017 11:34 AM    CO2 21 10/16/2017 11:34 AM    BUN 64 10/16/2017 11:34 AM    Creatinine 1.49 10/16/2017 11:34 AM    Calcium 10.0 10/16/2017 11:34 AM    Magnesium 1.6 10/16/2017 05:18 AM    Phosphorus 4.7 08/24/2017 06:12 AM      Lab Results   Component Value Date/Time    AST (SGOT) 46 10/16/2017 05:18 AM    Alk.  phosphatase 297 10/16/2017 05:18 AM    Protein, total 8.1 10/16/2017 05:18 AM    Albumin 2.7 10/16/2017 05:18 AM    Globulin 5.4 10/16/2017 05:18 AM     No results found for: INR, PTMR, PTP, PT1, PT2, APTT   No results found for: IRON, FE, TIBC, IBCT, PSAT, FERR   No results found for: PH, PCO2, PO2  No components found for: GLPOC   No results found for: CPK, CKMB Total time:   Counseling / coordination time, spent as noted above:   > 50% counseling / coordination?:     Prolonged service was provided for  []30 min   []75 min in face to face time in the presence of the patient, spent as noted above. Time Start:   Time End:   Note: this can only be billed with 46874 (initial) or 67287 (follow up). If multiple start / stop times, list each separately.

## 2017-10-16 NOTE — ED PROVIDER NOTES
HPI Comments: 46 y.o. female with past medical history significant for Crohn's disease, DVT, incarcerated ventral hernia, stroke, and seizures who presents from [facility] with chief complaint of abdominal pain. Patient states that she started to have \"stabbing\" epigastric abdominal pain today. Patient states that she usually has similar pain due to her Crohn's disease, but reports that it is usually in her lower quadrants. She complains of nausea, chills, and cough with clear production. She also reports having a \"sore\" near her vagina, which she noticed today. Patient reportedly has had 3 fistula surgeries since June, per her mother. She reportedly has 2 current non-healing fistulas which will need surgery in the future. Patient states that she was recently diagnosed with a UTI. She denies hx of kidney stones. There are no other acute medical concerns at this time. Social hx: former smoker, no EtOH use  PCP: No primary care provider on file. Note written by Rekha Be, as dictated by Urbano Sanchez MD 9:47 PM      The history is provided by the patient. No  was used. Past Medical History:   Diagnosis Date    Crohn's disease ([address] 8/15/2011    DVT (deep venous thrombosis) ([facility] [address] 8/15/2011    Incarcerated ventral hernia 8/15/2011    Right flank pain 8/22/2011    Seizures ([facility] [address]     Stroke [facility])        Past Surgical History:   Procedure Laterality Date    HX OTHER SURGICAL      multiple procedures related to her Crohn's disease    [place] LAP, INCISIONAL HERNIA REPAIR,INCARCERATED  9-10-08    dr. Sara Baltazar         Family History:   Problem Relation Age of Onset    Hypertension Father     Stroke Father     Other Father      arthritis       Social History     Social History    Marital status: SINGLE     Spouse name: N/A    Number of children: N/A    Years of education: N/A     Occupational History    Not on file.      Social History Main Topics    Smoking status: Former Smoker    Smokeless tobacco: Not on file    Alcohol use No    Drug use: Not on file    Sexual activity: Not on file     Other Topics Concern    Not on file     Social History Narrative         ALLERGIES: Demerol [meperidine]; Soma [carisoprodol]; Sulfa (sulfonamide antibiotics); and Toradol [ketorolac tromethamine]    Review of Systems   Constitutional: Positive for chills. Negative for diaphoresis and fever. HENT: Negative for congestion, postnasal drip, rhinorrhea and sore throat. Eyes: Negative for photophobia, discharge, redness and visual disturbance. Respiratory: Positive for cough. Negative for chest tightness, shortness of breath and wheezing. Cardiovascular: Negative for chest pain, palpitations and leg swelling. Gastrointestinal: Positive for abdominal pain. Negative for abdominal distention, blood in stool, constipation, diarrhea, nausea and vomiting. Genitourinary: Negative for difficulty urinating, dysuria, frequency, hematuria and urgency. Musculoskeletal: Negative for arthralgias, back pain, joint swelling and myalgias. Skin: Negative for color change and rash. Neurological: Negative for dizziness, speech difficulty, weakness, light-headedness, numbness and headaches. Psychiatric/Behavioral: Negative for confusion. The patient is not nervous/anxious. All other systems reviewed and are negative. Vitals:    10/15/17 2118   BP: 122/43   Pulse: (!) 128   Resp: 20   Temp: 98.1 °F (36.7 °C)   SpO2: 100%   Weight: 138 kg (304 lb 5 oz)   Height: 5' 4\" (1.626 m)            Physical Exam   Constitutional: She is oriented to person, place, and time. She appears well-developed and well-nourished. No distress. Morbidly obese. Tearful. HENT:   Head: Normocephalic and atraumatic.    Right Ear: External ear normal.   Left Ear: External ear normal.   Nose: Nose normal.   Mouth/Throat: Oropharynx is clear and moist.   Eyes: Conjunctivae and EOM are normal. Pupils are equal, round, and reactive to light. No scleral icterus. Neck: Normal range of motion. Neck supple. No JVD present. No tracheal deviation present. No thyromegaly present. Cardiovascular: Normal rate, regular rhythm and normal heart sounds. Exam reveals no gallop and no friction rub. No murmur heard. Pulmonary/Chest: Effort normal and breath sounds normal. No respiratory distress. She has no wheezes. She has no rales. She exhibits no tenderness. Abdominal: Soft. Bowel sounds are normal. She exhibits no distension and no mass. There is no tenderness. There is no rebound and no guarding. Multiple well-healed scars. Colostomy bag in LLQ. Central area of fistula drainage. Musculoskeletal: Normal range of motion. She exhibits no edema or tenderness. Lymphadenopathy:     She has no cervical adenopathy. Neurological: She is alert and oriented to person, place, and time. She has normal strength. She displays no atrophy and no tremor. No cranial nerve deficit. She exhibits normal muscle tone. Coordination and gait normal.   Skin: Skin is warm and dry. No rash noted. She is not diaphoretic. No erythema. Multiple tattoos   Psychiatric: She has a normal mood and affect. Her behavior is normal. Judgment and thought content normal.   Nursing note and vitals reviewed. Note written by Rekha Salazar, as dictated by Kerwin Alvarez MD 9:47 PM    MDM  Number of Diagnoses or Management Options  Abdominal pain, generalized:   Crohn's disease of small intestine with other complication [facility]):   Dehydration:   Enterocutaneous fistula:   Hypokalemia:   Hyponatremia:   Leukocytosis, unspecified type:   Renal insufficiency:   Diagnosis management comments: CHILDREY  Impression: 30-year-old female who is currently a resident at [facility] is sent in for evaluation of acute onset of diffuse but mostly right upper quadrant abdominal discomfort.  The patient has a complex medical history as outlined in the history of present illness. Differential includes exacerbation of Crohn's, abscess formation, worsening fistula, ischemic colitis. Plan of care we baseline labs to include a sepsis workup, CT scan of the abdomen, IV fluids analgesic and antiemetic medication and we'll treat accordingly. Critical Care  Total time providing critical care: (Total critical care time spend exclusive of procedures: 60 minutes  )    ED Course       Procedures  ED EKG interpretation:  Na0mgbg: sinus tachycardia; and regular . Rate (approx.): 113; Axis: normal; ST/T wave: non-specific changes. Note written by Rekha Martinez, as dictated by Laura Grider MD 9:48 PM    CONSULT NOTE:  11:07 PM Laura Grider MD spoke with Dr. Raysa Tran, Consult for Surgery. Discussed available diagnostic tests and clinical findings. He is in agreement with care plans as outlined. Dr. Raysa Tran will see the patient and call the hospitalist.       11:35 PM  Patient is being admitted to the hospital.  The results of their tests and reasons for their admission have been discussed with them and/or available family. They convey agreement and understanding for the need to be admitted and for their admission diagnosis.   Pt to be admitted to Hospitalist, Dr Denis Forrester.

## 2017-10-16 NOTE — PROGRESS NOTES
General Surgery Daily Progress Note    Admit Date: 10/15/2017  Post-Operative Day: * No surgery found * from * No surgery found *     Subjective:     Last 24 hrs: Pt is having pain. Complains she isn't getting everything she was getting at [address] [facility]. She c/o upper abd pain. Fistula appliance just changed. On levaquin, vanc, and zosyn    Objective:     Blood pressure 120/63, pulse (!) 101, temperature 97.9 °F (36.6 °C), resp. rate 23, height 5' 4\" (1.626 m), weight 305 lb 12.5 oz (138.7 kg), SpO2 96 %. Temp (24hrs), Av.9 °F (36.6 °C), Min:97.6 °F (36.4 °C), Max:98.1 °F (36.7 °C)      _____________________  Physical Exam:     Alert and Oriented, x3, appears uncomfortable. Cardiovascular: RRR 90s, no peripheral edema  Lungs:CTAB anteriorally  Abdomen: soft, +BS, tender in upper quadrants; fistula bag w/ scant output at this time; LLQ ostomy w/ little output as well      Assessment:   Principal Problem:    SIRS (systemic inflammatory response syndrome) ([facility]) (10/16/2017)    Active Problems:    Abdominal pain (2017)      Acute hyponatremia (10/16/2017)      Leukocytosis (10/16/2017)      Wound, open, anterior abdominal wall (10/16/2017)      DEAN (acute kidney injury) ([facility] [address] (10/16/2017)            Plan:     Pain mgmt per Palliative - to see today  Gi to see  Will order TPN  Cont abx  Monitor labs    Data Review:    Recent Labs      10/16/17   0518  10/15/17   2139   WBC  19.2*  23.5*   HGB  10.9*  11.9   HCT  32.2*  35.3   PLT  344  387     Recent Labs      10/16/17   0518  10/15/17   2139   NA  130*  126*   K  2.7*  3.1*   CL  98  93*   CO2  21  23   GLU  106*  114*   BUN  79*  94*   CREA  1.88*  2.26*   CA  10.5*  10.6*   MG  1.6   --    ALB  2.7*  3.1*   SGOT  46*  47*   ALT  56  66     No results for input(s): AML, LPSE in the last 72 hours.         ______________________  Medications:    Current Facility-Administered Medications   Medication Dose Route Frequency    sodium chloride (NS) flush 5-10 mL  5-10 mL IntraVENous Q8H    sodium chloride (NS) flush 5-10 mL  5-10 mL IntraVENous PRN    ondansetron (ZOFRAN) injection 4 mg  4 mg IntraVENous Q6H PRN    0.9% sodium chloride infusion  125 mL/hr IntraVENous CONTINUOUS    HYDROmorphone (PF) 15 mg/30 ml (DILAUDID) PCA   IntraVENous TITRATE    sodium chloride (NS) flush 5-10 mL  5-10 mL IntraVENous PRN    piperacillin-tazobactam (ZOSYN) 3.375 g in 0.9% sodium chloride (MBP/ADV) 100 mL  3.375 g IntraVENous Q8H    [START ON 10/17/2017] levoFLOXacin (LEVAQUIN) 750 mg in D5W IVPB  750 mg IntraVENous Q48H    sodium chloride (NS) flush 5-10 mL  5-10 mL IntraVENous PRN       Maurizio Lomeli NP  10/16/2017    I have independently examined the patient and have reviewed the chart. I agree with the above plan. Patient with abrupt onset of upper abdominal pain yesterday. Still having pain in bilateral upper quadrants. Had nausea earlier but none currently. Abd soft, ND. Fistula with wound manager in midline. Surrounding skin appears healthy. Concern that this could be a Crohn's flare. No obvious findings of perforation seen on CT, although imaging limited by lack of contrast.  GI to give trial of steroids. WBC improving on Abx. Will continue to follow. Would try to avoid surgical intervention unless absolutely necessary. TPN, NPO except ice chips.      Irvin James MD  10/16/2017  12:26 PM

## 2017-10-16 NOTE — PROGRESS NOTES
Problem: Pressure Injury - Risk of  Goal: *Prevention of pressure ulcer  Outcome: Progressing Towards Goal  Patient encouraged to turn and reposition, Assist patient when turning. Pressure reduction mattress, Skin kept dry and free from moisture.

## 2017-10-16 NOTE — PROGRESS NOTES
Hospitalist Progress Note                               Ailyn Shelby MD                                     Answering service: [phone]                               OR [phone] from in house phone                               Cell: [phone]              Date of Service:  10/16/2017  NAME:  Rafita Keller  :  1975  MRN:  [id number]      Admission Summary:   Per H&P \"The patient is a 22-year-old white   female with past medical history of Crohn disease, DVT, incarcerated   ventral hernia, chronic abdominal pain, morbid obesity, seizures,   stroke and multiple repeat abdominal surgeries, presented to the   emergency department via EMS from  [facility] with chief   complaint of abdominal pain. The patient has chronic abdominal pain. She has been on Dilaudid at [facility]. She notably has been on a PCA   according to her mother's report. In addition, she takes additional   doses of Dilaudid intermittently. Tonight she complains of stabbing,   severe generalized abdominal pain mostly in epigastrium according to   initial reports, rated at 10/10,  aggravated with any motion and   movement, without specific alleviating factors. She complains   of nausea, vomiting, chills, cough productive of clear sputum and per   the ER soreness near her vagina. She has open abdominal wound,   enterocutaneous fistula, which drains into collection bag. The patient   notes there has been some leakage around the fistula. She also has a   ileostomy and her mother notes there has been some \"blood\" seen   within the ostomy bag. The patient's recent history has been   complicated over the last several months. She reportedly had   undergone capsule endoscopy. She was subsequently admitted on   2017 secondary to retained video capsule. She underwent single   balloon enteroscopy on 2017 with iatrogenic bowel perforation   during enteroscopy.  She reportedly notably underwent diagnostic   laparoscopy converted to open exploratory laparotomy with extensive   (7-hour) lysis of adhesions with intraoperative upper endoscopy   performed and physical findings of a sealed perforated jejunum. Postoperatively the patient was noted to have MRSA wound infection   treated with IV vancomycin. She was discharged home with oral   antibiotics. The patient returned to the emergency department on   06/25/2017 with abdominal pain. She went back to OR on 06/28/2017   with this diagnosis of ischemic bowel and underwent exploratory   laparotomy, extensive lysis of adhesions with repair of enterotomy and   SMA stent on the left. Per review of chart records patient developed a   fistula  which required daily wound care, started on TPN. She was   essentially bed bound during the hospitalization and has   required multiple drains, which were eventually removed and replaced   with a wound VAC. She was eventually discharged to [facility]   with morphine PCA pump, TPN as well as additional oral pain   medications with intractable pain. During hospitalization, the patient   was seen by general surgery, vascular surgery, infectious disease,   cardiology, pulmonary  and palliative care services. The patient has   continued to require long-term skilled nursing care. She was   reportedly  recently treated for a UTI. There are no complaints   of syncope, loss of consciousness, new onset numbness,   paresthesias, slurred speech, facial droop, visual disturbance,   headache, neck pain, back pain, chest pain although at present, she   complains of chronic chest wall pain with light palpation and   hypersensitivity of her skin. She has not complained dysuria or   hematuria, increased calf pain, swelling, edema, fever, chills or rash.    On arrival in emergency department tonight, initial recorded vital signs   were blood pressure 122/43, heart rate 128, respiratory rate of 20, O2   saturation is 100% on room air. Workup included CT abdomen and   pelvis without contrast which showed large anterior abdominal wall and   site of previous known enterocutaneous fistula with mild stranding   around anterior abdominal wound as well as abdominal mesentery,   nonspecific wall thickening and inflammatory appearance of the   duodenum with nonspecific wall thickening with inflammatory   appearance of the duodenum. Labs showed elevated WBC 23,500. The patient had elevated BUN 94, creatinine of 2.26 and GFR of 24. Per the ED the patient was given Dilaudid 2 mg IV x2 doses, Zofran 8   mg IV x1 dose, Zofran 3.375 grams IV, levofloxacin 750 mg IV,   vancomycin 2000 mg IV. Blood cultures have been sent. The patient is   now seen for admission to the hospitalist service for continued   evaluation and treatments. The patient has already been seen by   general surgeon in consultation\". Interval history / Subjective:    Patient seen in ICU bed 8. She was resting after receiving iv dilaudid for breakthrough pain. Wound care nurse at bedside. Patient's mother present in the room. Assessment & Plan:   Acute on chronic abdomina pain associated with nausea and vomiting,POA. She was on dilaudid PCA which has not veen changed recently. -CT abdomen and pelvis showed arge anterior abdominal wound, and a site of previous/known enterocutaneous  fistula though this is difficult to assess given lack of oral and IV contrastmaterial. Mild stranding around the anterior abdominal wound as well as in the abdominal mesentery. Nonspecific wall thickening/inflammatory appearance of the duodenum.  -Not clear what caused this acute worsening of the pain. Chron's flare possible.  -Pain improved after palliative care team increased PCA setting and added iv push dilaudid for breakthrough pain. empiric antibiotics as below. -GI and surgery input appreciated. 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 Wound care team following. Systemic inflammatory response syndrome,poa.   -No obvious infectious source. Supportive care with fluids. Empiric antibiotics with intra abdominal coverage. Cultures pending. Hyponatremia,appears prerenal.Improved with fluids   Hypokalemia. Replace. Monitor. Acute kidney injury/dehydration. improving with fluids     Mildly elevated liver function tests (AST). -Hypoperfusion as reflected other DEAN and hyponatremia in the setting of extensive intra abdominal complications  -Improving with hydration. Monitor. Chronic pain syndrome. Dilaudid PCA setting  adjusted per palliative care team.Dilaudid iv prn for breakthrough. Morbid obesity  Body mass index is 52.49 kg/(m^2). Diet:TPN  Code status: full  DVT prophylaxis: scd  Care Plan discussed with: patient's mother,RN and wound care nurse. Discharge planning/disposition:TBD. [facility] Problems  Date Reviewed: 10/16/2017          Codes Class Noted POA    Acute hyponatremia ICD-10-CM: E87.1  ICD-9-CM: 276.1  10/16/2017 Unknown        Leukocytosis ICD-10-CM: D72.829  ICD-9-CM: 288.60  10/16/2017 Unknown        Wound, open, anterior abdominal wall ICD-10-CM: S31.109A  ICD-9-CM: 879.2  10/16/2017 Unknown        * (Principal)SIRS (systemic inflammatory response syndrome) ([facility] ICD-10-CM: R65.10  ICD-9-CM: 995.90  10/16/2017 Unknown        DEAN (acute kidney injury) ([facility].) ICD-10-CM: N17.9  ICD-9-CM: 584.9  10/16/2017 Unknown        Abdominal pain ICD-10-CM: R10.9  ICD-9-CM: 789.00  6/25/2017 Unknown                Review of Systems:   A comprehensive review of systems was negative except for that written in the HPI. Physical Examination:      Last 24hrs VS reviewed since prior progress note.  Most recent are:  Visit Vitals    BP (P) 129/64 (BP 1 Location: Left arm, BP Patient Position: At rest)    Pulse (!) (P) 110    Temp (P) 97.7 °F (36.5 °C)    Resp (P) 18    Ht 5' 4\" (1.626 m)    Wt 138.7 kg (305 lb 12.5 oz)    SpO2 (P) 99%    BMI 52.49 kg/m2           Constitutional:  Sleepy after dialudid   Eyes: Non icteric,non pallor,no erythema,discharge,PERRLA   ENT:  Oral mucous moist, oropharynx benign. Neck supple,    Resp:  CTA bilaterally. No wheezing/rhonchi/rales. No accessory muscle use   CV:  Regular rhythm, normal rate, no murmurs, gallops, rubs    GI:  Upper abdominal raw wound,clean base,multiple fistulae. Diffusely tender   :  No CVA or suprapubic tenderness        Musculoskeletal:  No edema, warm, 2+ pulses throughout    Neurologic:  AAOx3, CN II-XII reviewed. Moves all extremities. Intake/Output Summary (Last 24 hours) at 10/16/17 1720  Last data filed at 10/16/17 1200   Gross per 24 hour   Intake              200 ml   Output             1550 ml   Net            -1350 ml          Data Review:    Review and/or order of clinical lab test  Review and/or order of tests in the radiology section of CPT  Review and/or order of tests in the medicine section of CPT      Labs:     Recent Labs      10/16/17   0518  10/15/17   2139   WBC  19.2*  23.5*   HGB  10.9*  11.9   HCT  32.2*  35.3   PLT  344  387     Recent Labs      10/16/17   1134  10/16/17   0518  10/15/17   2139   NA  135*  130*  126*   K  3.3*  2.7*  3.1*   CL  104  98  93*   CO2  21  21  23   BUN  64*  79*  94*   CREA  1.49*  1.88*  2.26*   GLU  124*  106*  114*   CA  10.0  10.5*  10.6*   MG   --   1.6   --      Recent Labs      10/16/17   0518  10/15/17   2139   SGOT  46*  47*   ALT  56  66   AP  297*  342*   TBILI  0.8  0.7   TP  8.1  9.1*   ALB  2.7*  3.1*   GLOB  5.4*  6.0*     No results for input(s): INR, PTP, APTT in the last 72 hours. No lab exists for component: INREXT   No results for input(s): FE, TIBC, PSAT, FERR in the last 72 hours. No results found for: FOL, RBCF   No results for input(s): PH, PCO2, PO2 in the last 72 hours.   No results for input(s): CPK, CKNDX, TROIQ in the last 72 hours.     No lab exists for component: CPKMB  No results found for: CHOL, CHOLX, CHLST, CHOLV, HDL, LDL, LDLC, DLDLP, TGLX, TRIGL, TRIGP, CHHD, CHHDX  Lab Results   Component Value Date/Time    Glucose (POC) 117 08/24/2017 12:11 PM    Glucose (POC) 105 08/23/2017 11:04 PM    Glucose (POC) 112 08/23/2017 11:28 AM    Glucose (POC) 116 08/22/2017 09:12 PM    Glucose (POC) 135 08/22/2017 11:37 AM     Lab Results   Component Value Date/Time    Color YELLOW/STRAW 10/15/2017 11:02 PM    Appearance CLOUDY 10/15/2017 11:02 PM    Specific gravity 1.019 10/15/2017 11:02 PM    Specific gravity 1.020 07/07/2009 07:20 PM    pH (UA) 5.5 10/15/2017 11:02 PM    Protein TRACE 10/15/2017 11:02 PM    Glucose NEGATIVE  10/15/2017 11:02 PM    Ketone NEGATIVE  10/15/2017 11:02 PM    Bilirubin NEGATIVE  10/15/2017 11:02 PM    Urobilinogen 0.2 10/15/2017 11:02 PM    Nitrites NEGATIVE  10/15/2017 11:02 PM    Leukocyte Esterase MODERATE 10/15/2017 11:02 PM    Epithelial cells FEW 10/15/2017 11:02 PM    Bacteria NEGATIVE  10/15/2017 11:02 PM    WBC 5-10 10/15/2017 11:02 PM    RBC 5-10 10/15/2017 11:02 PM         Medications Reviewed:     Current Facility-Administered Medications   Medication Dose Route Frequency    sodium chloride (NS) flush 5-10 mL  5-10 mL IntraVENous Q8H    sodium chloride (NS) flush 5-10 mL  5-10 mL IntraVENous PRN    HYDROmorphone (PF) 15 mg/30 ml (DILAUDID) PCA   IntraVENous TITRATE    sodium chloride (NS) flush 5-10 mL  5-10 mL IntraVENous PRN    piperacillin-tazobactam (ZOSYN) 3.375 g in 0.9% sodium chloride (MBP/ADV) 100 mL  3.375 g IntraVENous Q8H    TPN ADULT - CENTRAL   IntraVENous CONTINUOUS    Vancomycin- pharmacy to dose   Other Rx Dosing/Monitoring    [START ON 10/17/2017] vancomycin (VANCOCIN) 1750 mg in  ml infusion  1,750 mg IntraVENous Q24H    prochlorperazine (COMPAZINE) with saline injection 5 mg  5 mg IntraVENous Q8H    levoFLOXacin (LEVAQUIN) 750 mg in D5W IVPB  750 mg IntraVENous Q24H    famotidine (PF) (PEPCID) 20 mg in sodium chloride 0.9 % 10 mL injection  20 mg IntraVENous Q12H    0.9% sodium chloride with KCl 40 mEq/L infusion   IntraVENous CONTINUOUS    ondansetron (ZOFRAN) injection 4 mg  4 mg IntraVENous Q6H    HYDROmorphone (PF) (DILAUDID) injection 1 mg  1 mg IntraVENous Q3H PRN    sodium chloride (NS) flush 5-10 mL  5-10 mL IntraVENous PRN     ______________________________________________________________________  EXPECTED LENGTH OF STAY: 12d 12h  ACTUAL LENGTH OF STAY:          0                 Duane Pott, MD

## 2017-10-16 NOTE — ROUTINE PROCESS
TRANSFER - IN REPORT:    Verbal report received from [facility] (name) on Radha العراقي  being received from ED(unit) for routine progression of care      Report consisted of patients Situation, Background, Assessment and   Recommendations(SBAR). Information from the following report(s) SBAR, Kardex, Intake/Output, MAR, Recent Results, Cardiac Rhythm Sinus Tach and Alarm Parameters  was reviewed with the receiving nurse. Opportunity for questions and clarification was provided. Assessment completed upon patients arrival to unit and care assumed.

## 2017-10-16 NOTE — PROGRESS NOTES
0403: Received patient from the ED for routine progression of care. Upon assessment, patient tearful, complains of 10/10 pain in abdomen. Alert and oriented, follows commands. VSS. PCA dilaudid ordered with a basal rate, but no pca dose rate. Will page MD to clarify. Ostomy appliance around fistula leaking slightly, 4 x 4 gauze applied to site. 26: Patient's mother at bedside. 46: Dr. Dari Lanza paged in regards to clarification of PCA. Patient also complaining of nausea. 12: Spoke with Dr. Dari Lanza and he informed me that he would just like a PCA dose ordered and he would modify the order. Phenergan ordered for nausea. Dr. Dari Lanza stated I could order a one dose of 1 mg of IV dilaudid now. 0500: Went in to check on patient and patient is sleeping. SBP in the high 90's, will hold off giving IV push dilaudid. Cathflo was instilled in purple port on PICC line prior to arrival to ICU. Able to obtain blood return from port. Blood wasted and morning labs drawn. 0510: Dr. Dari Lanza repaged as order has been modified but still states basal rate only. 0: Spoke with Dr. Dari Lanza and he will modify the order correctly. 3392: Paged Hospitalist on call for critical potassium of 2.7.     8603: Spoke with Dr. Dari Lanza and new orders received for potassium 20 mEQ x 2 runs and repeat BMP at 1000.    0715: Patient nauseated and vomiting clear emesis. 12.5 mg of phenergan given IV per order. Patient rates pain 8/10. Encouraged patient to use PCA. 0730 Shift Summary: Patient continues to have intermittent pain and nausea. Phenergan given. Encouraging use of PCA. Patient tearful. Alert and oriented, follows commands. VSS.

## 2017-10-16 NOTE — CONSULTS
[address] [place] [address] NOTE  [facility]  [phone] NP in-hospital cell phone M-F until 4:30  After 5pm or on weekends, please call  for physician on call        NAME:  Rafita Keller   :   1975   MRN:   [id number]       Referring Physician: Keira Ford    Consult Date: 10/16/2017 11:28 AM    Chief Complaint: intractable abdominal pain     History of Present Illness:  Patient is a 46 y.o. who is seen in consultation at the request of Dr. Bella Wharton for intractable abdominal pain. Medical history as listed below includes complicated course in the past with Crohn's disease including colectomy with end ileostomy, four prior partial small bowel resections, and recently a small bowel perforation during antegrade single balloon enteroscopy in search of a retained video capsule endoscopy (17). She was admitted  through  for unresectable ischemic bowel with SMA stent. She underwent laparotomy on 17, 17, and 17, with lysis of adhesions, repair of enterotomy and SMA stent, fistula exclusion, and wound vac. She was discharged to [facility] and has been on TPN and PCA for abdominal pain. Prior to recent surgeries patient's Crohn's disease had been well controlled on Remicade \"for years\". She reports acute increase in abdominal pain starting yesterday evening with associated nausea and vomiting. She denies aggravating or alleviating factors. She denies hemetemesis. She reports bloody output from ostomy and rectum. She believes this is an exacerbation of her Crohn's disease.    Last colonoscopy was 17 at [facility] showed erythematous mucosa at 5 cm proximal to stoma biopsy distal ilium mild increase in chronic inflammation    I have reviewed the emergency room note, hospital admission note, notes by all other clinicians who have seen the patient during this hospitalization to date. I have reviewed the problem list and the reason for this hospitalization. I have reviewed the allergies and the medications the patient was taking at home prior to this hospitalization. PMH:  Past Medical History:   Diagnosis Date    Crohn's disease ([facility] 8/15/2011    DVT (deep venous thrombosis) ([facility] 8/15/2011    Incarcerated ventral hernia 8/15/2011    Right flank pain 2011    Seizures ([facility]     Stroke [facility]        PSH:  Past Surgical History:   Procedure Laterality Date    HX OTHER SURGICAL      multiple procedures related to her Crohn's disease    SC LAP, INCISIONAL HERNIA REPAIR,INCARCERATED  9-10-08    dr. Roland Lay       Allergies: Allergies   Allergen Reactions    Demerol [Meperidine] Unknown (comments)    Soma [Carisoprodol] Rash and Nausea Only    Sulfa (Sulfonamide Antibiotics) Unknown (comments)    Toradol [Ketorolac Tromethamine] Unknown (comments)       Home Medications:  Prior to Admission Medications   Prescriptions Last Dose Informant Patient Reported? Taking? ALPRAZOLAM (XANAX PO)   Yes No   Sig: Take 1 mg by mouth three (3) times daily as needed. HYDROmorphone, PF, (DILAUDID) 1 mg/mL injection   No No   Si mL by IntraVENous route every three (3) hours as needed (Breakthrough pain). Max Daily Amount: 8 mg. albuterol (PROVENTIL HFA, VENTOLIN HFA, PROAIR HFA) 90 mcg/actuation inhaler   Yes No   Sig: Take 2 Puffs by inhalation every four (4) hours as needed for Wheezing. albuterol sulfate SR (PROVENTIL SR) 4 mg ER tablet   Yes No   Sig: Take 4 mg by mouth daily as needed for Other (Wheezing). Tries to alternate with albuterol MDI when she doesn't need rapid resolution of symptoms   aspirin 81 mg chewable tablet   No No   Sig: Take 1 Tab by mouth daily. clonazePAM (KLONOPIN) 1 mg tablet   Yes No   Sig: Take  by mouth two (2) times a day. clopidogrel (PLAVIX) 75 mg tab   No No   Sig: Take 1 Tab by mouth daily.    fluticasone-vilanterol (BREO ELLIPTA) 200-25 mcg/dose inhaler   Yes No   Sig: Take 1 Puff by inhalation daily. morphine CR (MS CONTIN) 60 mg CR tablet   No No   Sig: Take 1 Tab by mouth three (3) times daily. Max Daily Amount: 180 mg.   ondansetron (ZOFRAN) 4 mg/2 mL soln   No No   Si mL by IntraVENous route every six (6) hours as needed. pantoprazole (PROTONIX) 40 mg tablet   No No   Sig: Take 1 Tab by mouth Daily (before breakfast).       Facility-Administered Medications: None       Hospital Medications:  Current Facility-Administered Medications   Medication Dose Route Frequency    sodium chloride (NS) flush 5-10 mL  5-10 mL IntraVENous Q8H    sodium chloride (NS) flush 5-10 mL  5-10 mL IntraVENous PRN    ondansetron (ZOFRAN) injection 4 mg  4 mg IntraVENous Q6H PRN    0.9% sodium chloride infusion  125 mL/hr IntraVENous CONTINUOUS    HYDROmorphone (PF) 15 mg/30 ml (DILAUDID) PCA   IntraVENous TITRATE    sodium chloride (NS) flush 5-10 mL  5-10 mL IntraVENous PRN    piperacillin-tazobactam (ZOSYN) 3.375 g in 0.9% sodium chloride (MBP/ADV) 100 mL  3.375 g IntraVENous Q8H    [START ON 10/17/2017] levoFLOXacin (LEVAQUIN) 750 mg in D5W IVPB  750 mg IntraVENous Q48H    TPN ADULT - CENTRAL   IntraVENous CONTINUOUS    Vancomycin- pharmacy to dose   Other Rx Dosing/Monitoring    HYDROmorphone (PF) (DILAUDID) injection 1 mg  1 mg IntraVENous Q4H PRN    HYDROmorphone (PF) (DILAUDID) 1 mg/mL injection        sodium chloride (NS) flush 5-10 mL  5-10 mL IntraVENous PRN       Social History:  Social History   Substance Use Topics    Smoking status: Former Smoker    Smokeless tobacco: Not on file    Alcohol use No       Family History:  Family History   Problem Relation Age of Onset    Hypertension Father     Stroke Father     Other Father      arthritis       Review of Systems:  Constitutional: negative fever, +chills, negative weight loss  Eyes:   negative visual changes  ENT:   negative sore throat, tongue or lip swelling  Respiratory: negative cough, negative dyspnea  Cards:  negative for chest pain, palpitations, lower extremity edema  GI:   See HPI  :  negative for frequency, dysuria  Integument:  negative for rash and pruritus  Heme:  +for easy bruising and gum/nose bleeding  Musculoskeletal:+for myalgias, back pain and muscle weakness  Neuro:    +for headaches, dizziness, vertigo  Psych: negative for feelings depression; +anxiety     Objective:   Patient Vitals for the past 8 hrs:   BP Temp Pulse Resp SpO2 Weight   10/16/17 1000 120/63 - (!) 101 23 96 % -   10/16/17 0900 119/60 - (!) 102 21 96 % -   10/16/17 0800 112/58 97.9 °F (36.6 °C) (!) 104 21 95 % -   10/16/17 0700 106/67 - (!) 103 16 98 % -   10/16/17 0630 105/64 - 99 17 97 % -   10/16/17 0600 98/60 - 98 16 95 % -   10/16/17 0515 102/55 - (!) 107 18 95 % -   10/16/17 0500 98/56 - (!) 108 20 96 % -   10/16/17 0445 102/51 - (!) 110 14 95 % -   10/16/17 0442 93/55 - (!) 109 15 95 % -   10/16/17 0439 (!) 81/51 - (!) 113 20 96 % -   10/16/17 0415 - - - - - 138.7 kg (305 lb 12.5 oz)   10/16/17 0403 116/66 97.6 °F (36.4 °C) (!) 112 17 97 % -     10/16 0701 - 10/16 1900  In: 200 [I.V.:200]  Out: 1190 [Urine:1190]       EXAM:     CONST:  Moaning/crying lady lying in bed, mild acute distress   NEURO:  alert and oriented x 3   HEENT: EOMI, no scleral icterus   LUNGS: clear to ausculation diminished likely secondary to habitus, (-) wheeze   CARD:  regular  rhythm, tachycardic S1 S2   ABD:  Soft, severe generalized tenderness, no rebound, bowel sounds (+) all 4 quadrants, no masses, LLQ ostomy without bloody output; midline fistula with bilious drainage   EXT:  no edema, warm   PSYCH: full, anxious     Data Review     Recent Labs      10/16/17   0518  10/15/17   2139   WBC  19.2*  23.5*   HGB  10.9*  11.9   HCT  32.2*  35.3   PLT  344  387     Recent Labs      10/16/17   0518  10/15/17   2139   NA  130*  126*   K  2.7*  3.1*   CL  98  93*   CO2  21  23   BUN  79*  94*   CREA  1.88*  2.26*   GLU 106*  114*   CA  10.5*  10.6*     Recent Labs      10/16/17   0518  10/15/17   2139   SGOT  46*  47*   AP  297*  342*   TP  8.1  9.1*   ALB  2.7*  3.1*   GLOB  5.4*  6.0*     No results for input(s): INR, PTP, APTT in the last 72 hours. No lab exists for component: INREXT  CT IMPRESSION:  1. No nephrolithiasis or hydronephrosis. 2. Large anterior abdominal wound, and a site of previous/known enterocutaneous fistula though this is difficult to assess given lack of oral and IV contrast material. Mild stranding around the anterior abdominal wound as well as in the abdominal mesentery. Nonspecific wall thickening/inflammatory appearance of the duodenum. Assessment:   · Crohn's Disease: multiple abdominal surgeries; EC fistula    · Abdominal Pain: concern for ischemic and/or diversion colitis  · Elevated AST 45 and alk phos 297  · Leukocytosis  · DEAN with hypokalemia      Patient Active Problem List   Diagnosis Code    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    Acute hyponatremia E87.1    Leukocytosis D72.829    Wound, open, anterior abdominal wall S31.109A    SIRS (systemic inflammatory response syndrome) ([facility]) R65.10    DEAN (acute kidney injury) ([facility] N17.9     Plan:     · Check CRP and fecal calprotectin  · Start PPI  · Agree with TPN  · Agree with palliative consult to manage pain  · Continue antibiotics   · Thank you for allowing me to participate in care of [address] [place] [address]     Signed By: Honey Myers NP     10/16/2017  11:28 AM       GI Attending: Patient seen and she declined physical exam by me due to pain. CRP elevated. Lactic acid normal. She reports minimal bleeding from ileostomy and per rectum.  Continue TPN and antibiotics. If no improvement, we will proceed with IV steroids tomorrow. Previously, she did not have much benefit with Entocort. She has complex post-operative anatomy. I would not advise endoscopic evaluation given her prior perforation during enteroscopy. Cain Lozada MD

## 2017-10-16 NOTE — PROGRESS NOTES
Problem: General Medical Care Plan  Goal: *Optimal pain control at patients stated goal  Outcome: Progressing Towards Goal  Patient on Dilaudid PCA, including PRN dilaudid Q3 hours for break through pain mgmt

## 2017-10-16 NOTE — PROGRESS NOTES
Bedside shift change report given to May Godwin (oncoming nurse) by Heriberto Baldwin RN (offgoing nurse). Report included the following information SBAR, Kardex, ED Summary, Procedure Summary, Intake/Output, MAR, Recent Results and Cardiac Rhythm Sinus Tachycardia       Hourly rounds completed throughout shift.

## 2017-10-16 NOTE — PROCEDURES
[address]  *** FINAL REPORT ***    Name: Susan Lew  MRN: [id number]    Inpatient  : 15 Dec 1975  HIS Order #: [id number]  [address] [facility] Visit #: [id number]  Date: 16 Oct 2017    TYPE OF TEST: Peripheral Venous Testing    REASON FOR TEST  Leg edema, R/O DVT    Right Leg:-  Deep venous thrombosis:           No  Superficial venous thrombosis:    No  Deep venous insufficiency:        Not examined  Superficial venous insufficiency: Not examined    Left Leg:-  Deep venous thrombosis:           No  Superficial venous thrombosis:    No  Deep venous insufficiency:        Not examined  Superficial venous insufficiency: Not examined      INTERPRETATION/FINDINGS  PROCEDURE:  Color duplex ultrasound imaging of lower extremity veins. FINDINGS:       Right: The common femoral, deep femoral, femoral, popliteal,  posterior tibial, peroneal, and great saphenous are patent and without   evidence of thrombus;  each is fully compressible and there is no  narrowing of the flow channel on color Doppler imaging. Phasic flow  is observed in the common femoral vein. Left:   The common femoral, deep femoral, femoral, popliteal,  posterior tibial, peroneal, and great saphenous are patent and without   evidence of thrombus;  each is fully compressible and there is no  narrowing of the flow channel on color Doppler imaging. Phasic flow  is observed in the common femoral vein. IMPRESSION:  No evidence of right or left lower extremity vein  thrombosis where visualized. Patient unable to tolerate compression  test due to pain. Due to limited visualization of the left calf veins,   isolated calf vein thrombosis cannot be completely excluded. ADDITIONAL COMMENTS    I have personally reviewed the data relevant to the interpretation of  this  study.     TECHNOLOGIST: Bob Park RVT  Signed: 10/16/2017 06:27 PM    PHYSICIAN: Jesus Lopez MD  Signed: 10/17/2017 11:56 AM

## 2017-10-16 NOTE — H&P
[facility], [address]   HISTORY AND PHYSICAL       Name:  Silvia Mckeon   MR#:  [id number]   :  1975   Account #:  [de-identified]        Date of Adm:  10/15/2017       CHIEF COMPLAINT: Abdominal pain. HISTORY OF PRESENT ILLNESS: The patient is a 24-year-old white   female with past medical history of Crohn disease, DVT, incarcerated   ventral hernia, chronic abdominal pain, morbid obesity, seizures,   stroke and multiple repeat abdominal surgeries, presented to the   emergency department via EMS from  [facility] with chief   complaint of abdominal pain. The patient has chronic abdominal pain. She has been on Dilaudid at [facility]. She notably has been on a PCA   according to her mother's report. In addition, she takes additional   doses of Dilaudid intermittently. Tonight she complains of stabbing,   severe generalized abdominal pain mostly in epigastrium according to   initial reports, rated at 10/10,  aggravated with any motion and   movement, palpation without specific alleviating factors. She complains   of nausea, vomiting, chills, cough productive of clear sputum and per   the ER soreness near her vagina. She has open abdominal wound,   enterocutaneous fistula, which drains into collection bag. The patient   notes there has been some leakage around the fistula. She also has a   ileostomy and her mother notes there has been some \"blood\" seen   within the ostomy bag. The patient's recent history has been   complicated over the last several months. She reportedly had   undergone capsule endoscopy. She was subsequently admitted on   2017 secondary to retained video capsule. She underwent single   balloon enteroscopy on 2017 with iatrogenic bowel perforation   during enteroscopy.  She reportedly notably underwent diagnostic   laparoscopy converted to open exploratory laparotomy with extensive   (7-hour) lysis of adhesions with intraoperative upper endoscopy   performed and physical findings of a sealed perforated jejunum. Postoperatively the patient was noted to have MRSA wound infection   treated with IV vancomycin. She was discharged home with oral   antibiotics. The patient returned to the emergency department on   06/25/2017 with abdominal pain. She went back to OR on 06/28/2017   with this diagnosis of ischemic bowel and underwent exploratory   laparotomy, extensive lysis of adhesions with repair of enterotomy and   SMA stent on the left. Per review of chart records patient developed a   fistula  which required daily wound care, started on TPN. She was   essentially bed bound during the hospitalization and has   required multiple drains, which were eventually removed and replaced   with a wound VAC. She was eventually discharged to [facility]   with morphine PCA pump, TPN as well as additional oral pain   medications with intractable pain. During hospitalization, the patient   was seen by general surgery, vascular surgery, infectious disease,   cardiology, pulmonary  and palliative care services. The patient has   continued to require long-term skilled nursing care. She was   reportedly  recently treated for a UTI. There are no complaints   of syncope, loss of consciousness, new onset numbness,   paresthesias, slurred speech, facial droop, visual disturbance,   headache, neck pain, back pain, chest pain although at present, she   complains of chronic chest wall pain with light palpation and   hypersensitivity of her skin. She has not complained dysuria or   hematuria, increased calf pain, swelling, edema, fever, chills or rash. On arrival in emergency department [facility], initial recorded vital signs   were blood pressure 122/43, heart rate 128, respiratory rate of 20, O2   saturation is 100% on room air.  Workup included CT abdomen and   pelvis without contrast which showed large anterior abdominal wall and   site of previous known enterocutaneous fistula with mild stranding   around anterior abdominal wound as well as abdominal mesentery,   nonspecific wall thickening and inflammatory appearance of the   duodenum with nonspecific wall thickening with inflammatory   appearance of the duodenum. Labs showed elevated WBC 23,500. The patient had elevated BUN 94, creatinine of 2.26 and GFR of 24. Per the ED the patient was given Dilaudid 2 mg IV x2 doses, Zofran 8   mg IV x1 dose, Zofran 3.375 grams IV, levofloxacin 750 mg IV,   vancomycin 2000 mg IV. Blood cultures have been sent. The patient is   now seen for admission to the hospitalist service for continued   evaluation and treatments. The patient has already been seen by   general surgeon in consultation with whom I discussed case   tonight. On arrival to the patient's bedside. the patient's mother more   pain medications despite having any additional Dilaudid 1 mg IV   ordered prior to arrival to the bedside. PAST MEDICAL HISTORY:   1. Crohn disease. 2. DVT. 3. Incarcerated ventral hernia. 4. Enterocutaneous fistula. 5. Large abdominal wound. 6. Chronic abdominal pain. 7. Seizures and stroke per chart records. 7. Morbid obesity. 8. general debility, bed bound. PAST SURGICAL HISTORY: Status post single balloon enteroscopy   06/06/2077, exploratory laparotomy, extensive lysis of adhesions,   intraoperative endoscopy on 06/06/2017, exploratory laparotomy,   extensive lysis of adhesions, repair of enterotomy and left SMA stent   placement on 06/28/2017, PICC line insertion 08/02/2017, incisional   hernia repair 09/10/2008, multiple procedures related to Crohn disease   unspecified in chart records. MEDICATIONS: As noted on chart records:   1. Albuterol HFA 90 mcg actuation 2 puffs q.4h. p.r.n.   2. Albuterol sulfate 4 mg HI tablet p.o. daily p.r.n.   3. Alprazolam 1 mg p.o. t.i.d. p.r.n.   4. Aspirin 81 mg p.o. daily.    5. Temazepam 1 mg p.o. b.i.d.   6. Plavix 75 mg p.o. daily. 7. BREO Ellipta 200/25 mcg dose inhaler 1 puff daily. 8. Hydromorphone 1 mg/mL injection IM 3 hours p.r.n.   9. Morphine CR 50 mg p.o. t.i.d.   10. Ondansetron 4 mg per 2 mL IV every 6 hours p.r.n. 11. Pantoprazole 40 mg p.o. daily. ALLERGIES:   1. DEMEROL. 2. SOMA. 3. SULFA. 4. TRAMADOL. SOCIAL HISTORY: Former smoker of cigarettes, reportedly quit in   2017. NO reports of alcohol, illicit drugs. FAMILY HISTORY: Hypertension father, stroke father and arthritis   father. REVIEW OF SYSTEMS   Ten systems reviewed, pertinent positives as per HPI, otherwise   negative. PHYSICAL EXAMINATION   VITAL SIGNS: Temperature 98.1 degrees Fahrenheit, blood pressure   118/49, heart rate 117, respiratory rate 18, O2 saturation 98% on room   air. Recorded weight 204 pounds (138 kg), reported height of 5 feet 4   inches tall.   general: Morbidly obese female, in no acute respiratory distress. PSYCHIATRIC: The patient is awake, alert and oriented x 3, anxious. NEUROLOGIC: GCS of 15. Moves extremities x4, generalized   weakness. Sensation is grossly intact without slurred speech, facial   droop. HEENT: Normocephalic, atraumatic. PERRLA. EOMs intact. Sclerae   anicteric, conjunctivae are clear. Nares are patent. Oropharynx   appears clear. Tongue is midline, nonedematous. NECK: Supple, without lymphadenopathy, JVD, carotid bruits,   thyromegaly. LYMPH: Negative for cervical or supraclavicular adenopathy. RESPIRATORY: Lungs clear to auscultation bilaterally. CARDIOVASCULAR: Heart regular rate and rhythm, normal S1, S2,   no murmurs, rubs or gallops. GI: Abdomen soft, generalized tenderness on light palpation of the   patient's skin, voluntary guarding and rebound, no rigidity. No   auscultated abdominal bruits. The patient has a large anterior open   abdominal wound draining greenish fluid, left lower quadrant ostomy in   place without any obvious blood per ostomy.  I could not visualize as   the patient would not turn for examination. MUSCULOSKELETAL: No acute palpable bony deformity. Limited   range of motion with generalized weakness. Negative for calf   tenderness. VASCULAR: 2+ radial, 1+ dorsalis pedis pulses, without cyanosis or   clubbing. Trace lower extremity nonpitting edema. SKIN: Warm and dry with multiple skin tattoos. LABS WERE REVIEWED AS FOLLOWS: Sodium 126, potassium 3.1,   chloride 93, CO2 of 23, BUN is 94, creatinine is 2.26, glucose of 114,   anion gap of 10, calcium is 10.6, GFR is 24,  total bilirubin 0.7, total   protein 9.1, albumin is 3.1, ALT 56, AST of 47, alkaline phosphatase   342. Lactic acid 1.0. Serum qualitative hCG negative. WBC of 23.5,   hemoglobin of 11.9, hematocrit 35.3, platelets 067, neutrophils 54%,   bands 2. Urinalysis leukocyte esterase moderate, nitrites negative,   urobilinogen 22, bilirubin negative, blood moderate, ketones negative,   glucose negative, protein trace, pH 5.5, specific gravity 1.019, WBC 5-  10, RBCs 5-10, bacteria negative. CT abdomen and pelvis without   contrast: Results I reviewed. Chest x-ray reported with no acute   process. Twelve lead EKG: Sinus tachycardia, nonspecific T-wave   abnormality wave abnormality at 113 beats a minute. IMPRESSION AND PLAN:   1. Systemic inflammatory response syndrome. Admit to telemetry. At   this time the patient will be on IV fluids for hydration. Blood   cultures already sent. Continue with Zosyn, levofloxacin, vancomycin,   and IV antibiotics. May adjust antibiotics accordingly. Consider for Infectious Disease   consultation. 2. Leukocytosis. Plan as noted above. Repeat CBC. 3. Intractable abdominal pain. The patient was seen by enteral   surgeon. Will consult with gastroenterologist today. Past history   of Crohn disease may be possible flare. Continue with pain   management.  The patient had been on PCA pump in the past and is   requiring increased doses of Dilaudid tonight. I discussed with the   patient and her mother the concerns for pain. Also, informed her of the   side effects for medications including narcotic which could respiratory   depression, over sedation, hypotension. As such, I have placed   safety precautions along with orders for pain medications tonight. Proceeded to place the patient on a Dilaudid PCA pump with noted   safety concerns. Monitor closely and place on continuous pulse   oximetry. 4. Hyponatremia. Check urine sodium osmolality, repeat sodium levels. Proceed towards slow correction of sodium and monitor closely. Place   on neurovascular checks. 5. Hypokalemia. Order repeating potassium levels and provide   potassium replacement accordingly. 6. Acute kidney injury/dehydration. Order IV fluids for hydration   resuscitation. Repeat renal panel in a.m. Strict I's and O's.   7. Elevated liver function tests (AST). Repeat comprehensive   metabolic panel. 8. Elevated alkaline phosphatase. Again repeat levels. 9. Large open anterior abdominal wounds. Consult with wound care   team.   10. Enterocutaneous fistula. Draining a significant amount of   greenish fluid. Continue with general surgery and GI consultations. 11. Chronic pain syndrome. The patient had requested additional pain   medicines. Monitor closely. 12. History of DVT. Order a venous duplex to rule out any deep venous   thrombosis. 13. Venous thromboembolism prophylaxis. Continue with SCDs to   bilateral lower extremities if venous duplex is negative, for DVT. 14. Morbid obesity. 15. Generalized weakness and debility. Place on fall precautions. 16. Nausea and vomiting. Order Zofran. 17. Tachycardia. Continue telemetry monitoring. YUDITH Shaw MD MP / JAMI   D:  10/16/2017   04:03   T:  10/16/2017   06:18   Job #:  [id number]

## 2017-10-16 NOTE — ROUTINE PROCESS
Primary Nurse Seb Merida RN and Elicia Mckeon RN performed a dual skin assessment on this patient No impairment noted  Amador score is 11

## 2017-10-16 NOTE — ED TRIAGE NOTES
Over the last 2 hours has had a change in her abdominal pain and sent here from [address] [place] [address] for evaluation.+ N/V. Rectum closed but has had seepage. Last received Dilaudid at 1700.

## 2017-10-17 ENCOUNTER — APPOINTMENT (OUTPATIENT)
Dept: GENERAL RADIOLOGY | Age: 42
DRG: 386 | End: 2017-10-17
Attending: ANESTHESIOLOGY
Payer: MEDICARE

## 2017-10-17 LAB
ALBUMIN SERPL-MCNC: 1.9 G/DL (ref 3.5–5)
ALBUMIN/GLOB SERPL: 0.5 {RATIO} (ref 1.1–2.2)
ALP SERPL-CCNC: 181 U/L (ref 45–117)
ALT SERPL-CCNC: 34 U/L (ref 12–78)
ANION GAP SERPL CALC-SCNC: 7 MMOL/L (ref 5–15)
AST SERPL-CCNC: 25 U/L (ref 15–37)
BACTERIA SPEC CULT: NORMAL
BACTERIA SPEC CULT: NORMAL
BASOPHILS # BLD: 0 K/UL (ref 0–0.1)
BASOPHILS NFR BLD: 0 % (ref 0–1)
BILIRUB SERPL-MCNC: 0.8 MG/DL (ref 0.2–1)
BUN SERPL-MCNC: 32 MG/DL (ref 6–20)
BUN/CREAT SERPL: 34 (ref 12–20)
CALCIUM SERPL-MCNC: 8.5 MG/DL (ref 8.5–10.1)
CHLORIDE SERPL-SCNC: 114 MMOL/L (ref 97–108)
CO2 SERPL-SCNC: 20 MMOL/L (ref 21–32)
CREAT SERPL-MCNC: 0.94 MG/DL (ref 0.55–1.02)
DIFFERENTIAL METHOD BLD: ABNORMAL
EOSINOPHIL # BLD: 0.2 K/UL (ref 0–0.4)
EOSINOPHIL NFR BLD: 1 % (ref 0–7)
ERYTHROCYTE [DISTWIDTH] IN BLOOD BY AUTOMATED COUNT: 16.3 % (ref 11.5–14.5)
GLOBULIN SER CALC-MCNC: 4.2 G/DL (ref 2–4)
GLUCOSE SERPL-MCNC: 131 MG/DL (ref 65–100)
HCT VFR BLD AUTO: 27.7 % (ref 35–47)
HGB BLD-MCNC: 9.1 G/DL (ref 11.5–16)
LYMPHOCYTES # BLD: 1.7 K/UL (ref 0.8–3.5)
LYMPHOCYTES NFR BLD: 10 % (ref 12–49)
MAGNESIUM SERPL-MCNC: 1.2 MG/DL (ref 1.6–2.4)
MCH RBC QN AUTO: 27.7 PG (ref 26–34)
MCHC RBC AUTO-ENTMCNC: 32.9 G/DL (ref 30–36.5)
MCV RBC AUTO: 84.5 FL (ref 80–99)
METAMYELOCYTES NFR BLD MANUAL: 5 %
MONOCYTES # BLD: 1.2 K/UL (ref 0–1)
MONOCYTES NFR BLD: 7 % (ref 5–13)
MYELOCYTES NFR BLD MANUAL: 2 %
NEUTS BAND NFR BLD MANUAL: 5 %
NEUTS SEG # BLD: 12.4 K/UL (ref 1.8–8)
NEUTS SEG NFR BLD: 70 % (ref 32–75)
PHOSPHATE SERPL-MCNC: 1.3 MG/DL (ref 2.6–4.7)
PLATELET # BLD AUTO: 283 K/UL (ref 150–400)
PLATELET COMMENTS,PCOM: ABNORMAL
POTASSIUM SERPL-SCNC: 2.7 MMOL/L (ref 3.5–5.1)
PREALB SERPL-MCNC: 27 MG/DL (ref 20–40)
PROT SERPL-MCNC: 6.1 G/DL (ref 6.4–8.2)
RBC # BLD AUTO: 3.28 M/UL (ref 3.8–5.2)
RBC MORPH BLD: ABNORMAL
SERVICE CMNT-IMP: NORMAL
SODIUM SERPL-SCNC: 141 MMOL/L (ref 136–145)
WBC # BLD AUTO: 16.5 K/UL (ref 3.6–11)

## 2017-10-17 PROCEDURE — 3E0436Z INTRODUCTION OF NUTRITIONAL SUBSTANCE INTO CENTRAL VEIN, PERCUTANEOUS APPROACH: ICD-10-PCS | Performed by: FAMILY MEDICINE

## 2017-10-17 PROCEDURE — 74011000250 HC RX REV CODE- 250: Performed by: NURSE PRACTITIONER

## 2017-10-17 PROCEDURE — 74011250637 HC RX REV CODE- 250/637: Performed by: INTERNAL MEDICINE

## 2017-10-17 PROCEDURE — 74011250636 HC RX REV CODE- 250/636: Performed by: HOSPITALIST

## 2017-10-17 PROCEDURE — 74011000258 HC RX REV CODE- 258: Performed by: HOSPITALIST

## 2017-10-17 PROCEDURE — 84134 ASSAY OF PREALBUMIN: CPT | Performed by: NURSE PRACTITIONER

## 2017-10-17 PROCEDURE — 71010 XR CHEST PORT: CPT

## 2017-10-17 PROCEDURE — 84100 ASSAY OF PHOSPHORUS: CPT | Performed by: NURSE PRACTITIONER

## 2017-10-17 PROCEDURE — 74011000250 HC RX REV CODE- 250: Performed by: HOSPITALIST

## 2017-10-17 PROCEDURE — 83735 ASSAY OF MAGNESIUM: CPT | Performed by: NURSE PRACTITIONER

## 2017-10-17 PROCEDURE — 76010000093 HC SPECIAL PROCEDURE

## 2017-10-17 PROCEDURE — 74011250637 HC RX REV CODE- 250/637: Performed by: HOSPITALIST

## 2017-10-17 PROCEDURE — 74011250636 HC RX REV CODE- 250/636: Performed by: FAMILY MEDICINE

## 2017-10-17 PROCEDURE — 74011000258 HC RX REV CODE- 258: Performed by: FAMILY MEDICINE

## 2017-10-17 PROCEDURE — 74011000258 HC RX REV CODE- 258: Performed by: NURSE PRACTITIONER

## 2017-10-17 PROCEDURE — 74011250636 HC RX REV CODE- 250/636: Performed by: PHYSICIAN ASSISTANT

## 2017-10-17 PROCEDURE — 74011250636 HC RX REV CODE- 250/636

## 2017-10-17 PROCEDURE — C1751 CATH, INF, PER/CENT/MIDLINE: HCPCS

## 2017-10-17 PROCEDURE — 02HV33Z INSERTION OF INFUSION DEVICE INTO SUPERIOR VENA CAVA, PERCUTANEOUS APPROACH: ICD-10-PCS | Performed by: ANESTHESIOLOGY

## 2017-10-17 PROCEDURE — 65660000000 HC RM CCU STEPDOWN

## 2017-10-17 PROCEDURE — 85025 COMPLETE CBC W/AUTO DIFF WBC: CPT | Performed by: HOSPITALIST

## 2017-10-17 PROCEDURE — 74011250636 HC RX REV CODE- 250/636: Performed by: NURSE PRACTITIONER

## 2017-10-17 PROCEDURE — 74011000250 HC RX REV CODE- 250: Performed by: INTERNAL MEDICINE

## 2017-10-17 PROCEDURE — 80053 COMPREHEN METABOLIC PANEL: CPT | Performed by: NURSE PRACTITIONER

## 2017-10-17 PROCEDURE — 36415 COLL VENOUS BLD VENIPUNCTURE: CPT | Performed by: NURSE PRACTITIONER

## 2017-10-17 PROCEDURE — 74011250636 HC RX REV CODE- 250/636: Performed by: INTERNAL MEDICINE

## 2017-10-17 RX ORDER — HEPARIN SODIUM 5000 [USP'U]/ML
5000 INJECTION, SOLUTION INTRAVENOUS; SUBCUTANEOUS EVERY 8 HOURS
COMMUNITY
End: 2017-10-27

## 2017-10-17 RX ORDER — MIDAZOLAM HYDROCHLORIDE 1 MG/ML
1-5 INJECTION, SOLUTION INTRAMUSCULAR; INTRAVENOUS
Status: CANCELLED | OUTPATIENT
Start: 2017-10-17

## 2017-10-17 RX ORDER — ACETAMINOPHEN 325 MG/1
650 TABLET ORAL
COMMUNITY
End: 2018-08-13 | Stop reason: ALTCHOICE

## 2017-10-17 RX ORDER — MAGNESIUM SULFATE HEPTAHYDRATE 40 MG/ML
2 INJECTION, SOLUTION INTRAVENOUS ONCE
Status: COMPLETED | OUTPATIENT
Start: 2017-10-17 | End: 2017-10-17

## 2017-10-17 RX ORDER — POTASSIUM CHLORIDE 7.45 MG/ML
10 INJECTION INTRAVENOUS
Status: DISCONTINUED | OUTPATIENT
Start: 2017-10-17 | End: 2017-10-17 | Stop reason: SDUPTHER

## 2017-10-17 RX ORDER — POTASSIUM CHLORIDE 14.9 MG/ML
10 INJECTION INTRAVENOUS
Status: COMPLETED | OUTPATIENT
Start: 2017-10-17 | End: 2017-10-17

## 2017-10-17 RX ORDER — ALBUTEROL SULFATE 0.83 MG/ML
2.5 SOLUTION RESPIRATORY (INHALATION)
COMMUNITY
End: 2018-02-27

## 2017-10-17 RX ORDER — VANCOMYCIN 1.75 GRAM/500 ML IN 0.9 % SODIUM CHLORIDE INTRAVENOUS
1750 EVERY 12 HOURS
Status: DISCONTINUED | OUTPATIENT
Start: 2017-10-17 | End: 2017-10-18 | Stop reason: ALTCHOICE

## 2017-10-17 RX ORDER — PIPERACILLIN SODIUM, TAZOBACTAM SODIUM 3; .375 G/15ML; G/15ML
3.38 INJECTION, POWDER, LYOPHILIZED, FOR SOLUTION INTRAVENOUS EVERY 6 HOURS
COMMUNITY
End: 2017-10-27

## 2017-10-17 RX ORDER — NYSTATIN 100000 [USP'U]/G
POWDER TOPICAL 2 TIMES DAILY
COMMUNITY
End: 2017-10-27

## 2017-10-17 RX ORDER — UREA 10 %
100 LOTION (ML) TOPICAL DAILY
COMMUNITY
End: 2018-05-23

## 2017-10-17 RX ORDER — CLONAZEPAM 0.5 MG/1
0.25 TABLET ORAL DAILY
Status: DISCONTINUED | OUTPATIENT
Start: 2017-10-17 | End: 2017-10-17 | Stop reason: SDUPTHER

## 2017-10-17 RX ORDER — CLONAZEPAM 0.5 MG/1
0.62 TABLET ORAL 2 TIMES DAILY
COMMUNITY
End: 2017-10-27

## 2017-10-17 RX ORDER — FENTANYL 75 UG/H
1 PATCH TRANSDERMAL
Status: DISCONTINUED | OUTPATIENT
Start: 2017-10-17 | End: 2017-10-18

## 2017-10-17 RX ORDER — CLONAZEPAM 0.5 MG/1
0.5 TABLET ORAL 2 TIMES DAILY
Status: DISCONTINUED | OUTPATIENT
Start: 2017-10-17 | End: 2017-10-27 | Stop reason: HOSPADM

## 2017-10-17 RX ORDER — MIRTAZAPINE 15 MG/1
15 TABLET, FILM COATED ORAL
COMMUNITY
End: 2017-10-27

## 2017-10-17 RX ORDER — HYDROMORPHONE HYDROCHLORIDE 4 MG/1
4-8 TABLET ORAL
COMMUNITY
End: 2017-10-27

## 2017-10-17 RX ORDER — CLONAZEPAM 1 MG/1
1 TABLET ORAL
Status: DISCONTINUED | OUTPATIENT
Start: 2017-10-17 | End: 2017-10-17 | Stop reason: SDUPTHER

## 2017-10-17 RX ORDER — FLUTICASONE FUROATE AND VILANTEROL 100; 25 UG/1; UG/1
1 POWDER RESPIRATORY (INHALATION) DAILY
COMMUNITY
End: 2018-02-27

## 2017-10-17 RX ORDER — MORPHINE SULFATE/0.9% NACL/PF 1 MG/ML
PLASTIC BAG, INJECTION (ML) INTRAVENOUS CONTINUOUS
COMMUNITY
End: 2017-10-27

## 2017-10-17 RX ORDER — SODIUM CHLORIDE 9 MG/ML
75 INJECTION, SOLUTION INTRAVENOUS CONTINUOUS
Status: DISPENSED | OUTPATIENT
Start: 2017-10-17 | End: 2017-10-17

## 2017-10-17 RX ORDER — MIDAZOLAM HYDROCHLORIDE 1 MG/ML
5 INJECTION, SOLUTION INTRAMUSCULAR; INTRAVENOUS ONCE
Status: COMPLETED | OUTPATIENT
Start: 2017-10-17 | End: 2017-10-17

## 2017-10-17 RX ADMIN — HYDROMORPHONE HYDROCHLORIDE 1 MG: 1 INJECTION, SOLUTION INTRAMUSCULAR; INTRAVENOUS; SUBCUTANEOUS at 13:37

## 2017-10-17 RX ADMIN — PIPERACILLIN SODIUM,TAZOBACTAM SODIUM 3.38 G: 3; .375 INJECTION, POWDER, FOR SOLUTION INTRAVENOUS at 17:06

## 2017-10-17 RX ADMIN — Medication: at 21:30

## 2017-10-17 RX ADMIN — VANCOMYCIN HYDROCHLORIDE 1750 MG: 10 INJECTION, POWDER, LYOPHILIZED, FOR SOLUTION INTRAVENOUS at 19:35

## 2017-10-17 RX ADMIN — SODIUM CHLORIDE 5 MG: 9 INJECTION INTRAMUSCULAR; INTRAVENOUS; SUBCUTANEOUS at 05:42

## 2017-10-17 RX ADMIN — Medication 10 ML: at 14:00

## 2017-10-17 RX ADMIN — CALCIUM GLUCONATE: 94 INJECTION, SOLUTION INTRAVENOUS at 02:20

## 2017-10-17 RX ADMIN — POTASSIUM CHLORIDE 10 MEQ: 14.9 INJECTION, SOLUTION INTRAVENOUS at 13:02

## 2017-10-17 RX ADMIN — POTASSIUM CHLORIDE 10 MEQ: 14.9 INJECTION, SOLUTION INTRAVENOUS at 12:00

## 2017-10-17 RX ADMIN — VANCOMYCIN HYDROCHLORIDE 1750 MG: 10 INJECTION, POWDER, LYOPHILIZED, FOR SOLUTION INTRAVENOUS at 03:54

## 2017-10-17 RX ADMIN — ONDANSETRON 4 MG: 2 INJECTION INTRAMUSCULAR; INTRAVENOUS at 05:41

## 2017-10-17 RX ADMIN — POTASSIUM CHLORIDE 10 MEQ: 14.9 INJECTION, SOLUTION INTRAVENOUS at 15:11

## 2017-10-17 RX ADMIN — MIDAZOLAM HYDROCHLORIDE 5 MG: 1 INJECTION, SOLUTION INTRAMUSCULAR; INTRAVENOUS at 16:15

## 2017-10-17 RX ADMIN — HYDROMORPHONE HYDROCHLORIDE 1 MG: 1 INJECTION, SOLUTION INTRAMUSCULAR; INTRAVENOUS; SUBCUTANEOUS at 09:34

## 2017-10-17 RX ADMIN — SODIUM CHLORIDE 75 ML/HR: 900 INJECTION, SOLUTION INTRAVENOUS at 10:40

## 2017-10-17 RX ADMIN — HYDROMORPHONE HYDROCHLORIDE 1 MG: 1 INJECTION, SOLUTION INTRAMUSCULAR; INTRAVENOUS; SUBCUTANEOUS at 18:00

## 2017-10-17 RX ADMIN — Medication 10 ML: at 21:35

## 2017-10-17 RX ADMIN — HYDROMORPHONE HYDROCHLORIDE 1 MG: 1 INJECTION, SOLUTION INTRAMUSCULAR; INTRAVENOUS; SUBCUTANEOUS at 05:41

## 2017-10-17 RX ADMIN — HYDROMORPHONE HYDROCHLORIDE 1 MG: 1 INJECTION, SOLUTION INTRAMUSCULAR; INTRAVENOUS; SUBCUTANEOUS at 02:12

## 2017-10-17 RX ADMIN — POTASSIUM CHLORIDE 10 MEQ: 14.9 INJECTION, SOLUTION INTRAVENOUS at 14:11

## 2017-10-17 RX ADMIN — SODIUM CHLORIDE AND POTASSIUM CHLORIDE: 9; 2.98 INJECTION, SOLUTION INTRAVENOUS at 02:01

## 2017-10-17 RX ADMIN — METHYLPREDNISOLONE SODIUM SUCCINATE 20 MG: 40 INJECTION, POWDER, FOR SOLUTION INTRAMUSCULAR; INTRAVENOUS at 18:04

## 2017-10-17 RX ADMIN — ONDANSETRON 4 MG: 2 INJECTION INTRAMUSCULAR; INTRAVENOUS at 19:40

## 2017-10-17 RX ADMIN — CLONAZEPAM 0.5 MG: 0.5 TABLET ORAL at 21:39

## 2017-10-17 RX ADMIN — ONDANSETRON 4 MG: 2 INJECTION INTRAMUSCULAR; INTRAVENOUS at 11:52

## 2017-10-17 RX ADMIN — SODIUM CHLORIDE 5 MG: 9 INJECTION INTRAMUSCULAR; INTRAVENOUS; SUBCUTANEOUS at 18:08

## 2017-10-17 RX ADMIN — HYDROMORPHONE HYDROCHLORIDE 1 MG: 1 INJECTION, SOLUTION INTRAMUSCULAR; INTRAVENOUS; SUBCUTANEOUS at 21:12

## 2017-10-17 RX ADMIN — FAMOTIDINE 20 MG: 10 INJECTION, SOLUTION INTRAVENOUS at 09:28

## 2017-10-17 RX ADMIN — MAGNESIUM SULFATE HEPTAHYDRATE 2 G: 40 INJECTION, SOLUTION INTRAVENOUS at 10:56

## 2017-10-17 RX ADMIN — CALCIUM GLUCONATE: 94 INJECTION, SOLUTION INTRAVENOUS at 21:21

## 2017-10-17 RX ADMIN — Medication 10 ML: at 05:42

## 2017-10-17 RX ADMIN — CLONAZEPAM 0.5 MG: 0.5 TABLET ORAL at 12:50

## 2017-10-17 RX ADMIN — PIPERACILLIN SODIUM,TAZOBACTAM SODIUM 3.38 G: 3; .375 INJECTION, POWDER, FOR SOLUTION INTRAVENOUS at 05:51

## 2017-10-17 RX ADMIN — PIPERACILLIN SODIUM,TAZOBACTAM SODIUM 3.38 G: 3; .375 INJECTION, POWDER, FOR SOLUTION INTRAVENOUS at 21:38

## 2017-10-17 RX ADMIN — POTASSIUM PHOSPHATE, MONOBASIC AND POTASSIUM PHOSPHATE, DIBASIC: 224; 236 INJECTION, SOLUTION INTRAVENOUS at 18:14

## 2017-10-17 RX ADMIN — FAMOTIDINE 20 MG: 10 INJECTION, SOLUTION INTRAVENOUS at 21:39

## 2017-10-17 RX ADMIN — Medication: at 10:44

## 2017-10-17 NOTE — PROCEDURES
Central Line Placement    Start time: 10/17/2017 3:55 PM  End time: 10/17/2017 4:08 PM  Performed by: NEDRA Perez  Authorized by: NEDRA Perez     Indications: vascular access  Preanesthetic Checklist: patient identified, risks and benefits discussed, anesthesia consent, site marked, patient being monitored and timeout performed    Timeout Time: 15:55       Pre-procedure: All elements of maximal sterile barrier technique followed?  Yes    Maximal barrier precautions followed, 2% Chlorhexidine for cutaneous antisepsis and Hand hygiene performed prior to catheter insertion            Procedure:   Prep:  Chlorhexidine  Location:  Internal jugular  Orientation:  Right  Patient position:  Flat  Catheter type:  Quad lumen  Catheter size:  8.5 Fr  Catheter length:  16 cm  Number of attempts:  1  Successful placement: Yes      Assessment:   Post-procedure:  Catheter secured and sterile dressing with CHG applied  Assessment:  Blood return through all ports, free fluid flow and guidewire removal verified  Insertion:  Uncomplicated  Patient tolerance:  Patient tolerated the procedure well with no immediate complications

## 2017-10-17 NOTE — ROUTINE PROCESS
Primary Nurse Sapphire Berkowitz and Janelle Arora, RN performed a dual skin assessment on this patient Impairment noted- see wound doc flow sheet. Bruising noted under pt's R armpit, under R arm, under L arm, and scattered on both sids of abd. Abrasion noted in crease of R arm.     Amador score is 14

## 2017-10-17 NOTE — PROGRESS NOTES
[address], [address]       GI PROGRESS NOTE  Will Rossy Ramírez  [phone] office  [phone] NP/PA in-hospital cell phone M-F until 4:30PM  After 5PM or on weekends, please call  for physician on call      NAME: Natasha Pelayo   :  1975   MRN:  [id number]       Subjective:   Patient reports some improvement in her abdominal pain. No nausea/vomiting. Objective:     VITALS:   Last 24hrs VS reviewed since prior progress note. Most recent are:  Visit Vitals    /69 (BP 1 Location: Left arm, BP Patient Position: At rest)    Pulse (!) 105    Temp 98.5 °F (36.9 °C)    Resp 18    Ht 5' 4\" (1.626 m)    Wt 141.6 kg (312 lb 2.7 oz)    SpO2 100%    BMI 53.58 kg/m2       PHYSICAL EXAM:  General: Cooperative, no acute distress    Neurologic:  Alert and oriented X 3. HEENT: EOMI, no scleral icterus   Lungs:  CTA bilaterally. No wheezing  Heart:  S1 S2, regular rhythm, no murmur   Abdomen: Obese, non-distended, patient refused palpation. Hypoactive bowel sounds. Fistula dressed with yellow drainage. LLQ ostomy with minimal output. Extremities: No edema  Psych:   Good insight. Not anxious or agitated. Lab Data Reviewed:     Recent Results (from the past 24 hour(s))   METABOLIC PANEL, COMPREHENSIVE    Collection Time: 10/17/17  6:04 AM   Result Value Ref Range    Sodium 141 136 - 145 mmol/L    Potassium 2.7 (LL) 3.5 - 5.1 mmol/L    Chloride 114 (H) 97 - 108 mmol/L    CO2 20 (L) 21 - 32 mmol/L    Anion gap 7 5 - 15 mmol/L    Glucose 131 (H) 65 - 100 mg/dL    BUN 32 (H) 6 - 20 MG/DL    Creatinine 0.94 0.55 - 1.02 MG/DL    BUN/Creatinine ratio 34 (H) 12 - 20      GFR est AA >60 >60 ml/min/1.73m2    GFR est non-AA >60 >60 ml/min/1.73m2    Calcium 8.5 8.5 - 10.1 MG/DL    Bilirubin, total 0.8 0.2 - 1.0 MG/DL    ALT (SGPT) 34 12 - 78 U/L    AST (SGOT) 25 15 - 37 U/L    Alk.  phosphatase 181 (H) 45 - 117 U/L    Protein, total 6.1 (L) 6.4 - 8.2 g/dL    Albumin 1.9 (L) 3.5 - 5.0 g/dL    Globulin 4.2 (H) 2.0 - 4.0 g/dL    A-G Ratio 0.5 (L) 1.1 - 2.2     MAGNESIUM    Collection Time: 10/17/17  6:04 AM   Result Value Ref Range    Magnesium 1.2 (L) 1.6 - 2.4 mg/dL   PHOSPHORUS    Collection Time: 10/17/17  6:04 AM   Result Value Ref Range    Phosphorus 1.3 (L) 2.6 - 4.7 MG/DL   PREALBUMIN    Collection Time: 10/17/17  6:04 AM   Result Value Ref Range    Prealbumin 27.0 20.0 - 40.0 mg/dL   CBC WITH AUTOMATED DIFF    Collection Time: 10/17/17  6:04 AM   Result Value Ref Range    WBC 16.5 (H) 3.6 - 11.0 K/uL    RBC 3.28 (L) 3.80 - 5.20 M/uL    HGB 9.1 (L) 11.5 - 16.0 g/dL    HCT 27.7 (L) 35.0 - 47.0 %    MCV 84.5 80.0 - 99.0 FL    MCH 27.7 26.0 - 34.0 PG    MCHC 32.9 30.0 - 36.5 g/dL    RDW 16.3 (H) 11.5 - 14.5 %    PLATELET 676 100 - 934 K/uL    NEUTROPHILS 70 32 - 75 %    BAND NEUTROPHILS 5 %    LYMPHOCYTES 10 (L) 12 - 49 %    MONOCYTES 7 5 - 13 %    EOSINOPHILS 1 0 - 7 %    BASOPHILS 0 0 - 1 %    METAMYELOCYTES 5 %    MYELOCYTES 2 %    ABS. NEUTROPHILS 12.4 (H) 1.8 - 8.0 K/UL    ABS. LYMPHOCYTES 1.7 0.8 - 3.5 K/UL    ABS. MONOCYTES 1.2 (H) 0.0 - 1.0 K/UL    ABS. EOSINOPHILS 0.2 0.0 - 0.4 K/UL    ABS. BASOPHILS 0.0 0.0 - 0.1 K/UL    DF MANUAL      PLATELET COMMENTS LARGE PLATELETS      RBC COMMENTS ANISOCYTOSIS  1+            Assessment:   · Crohn's Disease: multiple abdominal surgeries; EC fistula    · Abdominal Pain: AST, Alk phosphatase, and leukocytosis improving. CRP >9.5. Fecal calprotectin pending. Surgery evaluated with no further plans at this time.    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 IV steroids have been started. We will see how she responds to this and if tolerated, we can consider transition to PO steroids. Follow up surgery recommendations regarding EC fistula management. Clear liquid diet started. Continue TPN. Will follow. Cain Anglin MD

## 2017-10-17 NOTE — PROGRESS NOTES
Patient in from [facility], multiple issues. Spoke with Margoth Jacobs, [phone], and went over progress notes. Freida said it sounded like they could do for patient what we were doing here if we wanted to send patient back there and patient is agreeable.   Sent referral.

## 2017-10-17 NOTE — ROUTINE PROCESS
8: 62 AM  Spoke with Dr. Guerrero Mcdaniel. Informed him of critical potassium lab result and discussed concerns over pt access as pt is refusing peripheral IV access. He states he will review chart and consider options. 9:50 AM  Spoke with Dr. Guerrero Mcdaniel again Re pt's access and we consulted with PICC Team. They will try to gain additional access for pt.    10:45 AM  PICC team unable to gain additional access via peripheral IV. Dr. Guerrero Mcdaniel is aware and states he will put in order for central line. 2:45 PM  Paged anesthesia for consult for central line. Spoke with Dr. Brooklyn Worrell and provided info to OR staff, Chase Stoner. Faith to call RN when pt can be seen. 5:03 PM  Pt returned from OR central line placement. VS taken (see flowsheet). 5:30 PM  RN informed Dr. Guerrero Mcdaniel of central line placement for pt.    7:30 PM  Bedside and Verbal shift change report given to Rene Barry RN (oncoming nurse) by Carson Roland RN (offgoing nurse). Report included the following information SBAR, Kardex, Intake/Output, MAR, Accordion, Recent Results and Cardiac Rhythm NSR to [facility]

## 2017-10-17 NOTE — PROGRESS NOTES
Problem: Discharge Planning  Goal: *Discharge to safe environment  Outcome: Progressing Towards Goal  Discussed with pt importance of following up with a nutritionist to aide in weight loss and wound healing. Pt seemed uninterested.

## 2017-10-17 NOTE — PROGRESS NOTES
NUTRITION COMPLETE ASSESSMENT    RECOMMENDATIONS:   1. TPN Cycle  Day 1 (10/17), 13 hours:   5%AA D20 @ 85 mL/hr x 1 hour; 165 mL/hr x 11 hours; 85 mL/hr x last hour    Day 2 (10/18), 15 hours:  5%AA D20 @ 85 mL/hr x 1 hour    @ 172 mL/hr x 13 hours    @ 85 mL/hr x 1 hour   + 20% lipids, 500 mL 3x/week   + Add MVI, ascorbic acid 500 mg, zinc sulfate (10 mg/day x 10 days)    2. Daily weights (standing when able)     Interventions/Plan:   Food/Nutrient Delivery:  TPN    Assessment:   Reason for Assessment:   [x] Provider Consult  [x] BPA/MST Referral     Diet:  NPO  Nutritionally Significant Medications: [x] Reviewed & Includes:    TPN: 5%AA D20 @ 63 mL/hr + MVI, trace elements    Subjective:  Pt with nausea and abdominal pain. Objective:  Chart reviewed, discussed with RN and team during interdisciplinary rounds. Pt admitted from [facility] with SIRS. PMHx: Crohn's, multiple abdominal surgeries, open abdominal wound, EC fistula, ileostomy, long term TPN, morbid obesity, . Pt is familiar to this nutrition team from previous admissions. She remains on TPN at [facility] (5%AA D20, 2L/day+ 20% lipids, 250 mL MWF). Above TPN appears to have been providing 1774 kcal, 100 g protein in 2L-- meeting 91% of estimated kcal and protein needs, respectively. Agree with cycling TPN to prevent cholestasis and make better use of line. Above 13 hour cyclic TPN for today to provide 1746 kcal, 99 g protein in 1985 mL-- meeting 70% and 90% of estimated kcal and protein needs, respectively. Above goal cyclic regimen to provide a daily average of 2546 kcal, 120 g protein in 2406 mL-- meeting 100% of estimated needs. Estimated Nutrition Needs:   Kcals/day: 5524 Kcals/day (1142-1040 kcal/day (MSJ x 1.2-1.3))  Protein: 110 g (110-136 g/day (2-2.5 g/kg IBW))  Fluid: 2500 ml (1 mL/kcal (adjust per fistula output))     Based On:  [facility]  Weight Used:  (Actual wt of 141.6 kg (kcal needs); IBW of 55 kg (pro needs)  )    Pt expected to meet estimated nutrient needs:  [x]   Yes  (via TPN)    Nutrition Diagnosis:   1. Altered GI function related to Crohn's, multiple abdominal surgeries, frozen abdomen, fistula as evidenced by need for chronic TPN    Goals:     Pt to meet at least 90% of estimated needs via TPN over the next 5-7 days     Monitoring & Evaluation:    - Enteral/parenteral nutrition intake   - Weight/weight change     Previous Nutrition Goals Met:  N/A  Previous Recommendations:      N/A    Education & Discharge Needs:   [x] None Identified   [] Identified and addressed    [x] Participated in care plan, discharge planning, and/or interdisciplinary rounds        Cultural, Faith and ethnic food preferences identified:  NONE      Skin Integrity: []Intact  [x]Other: see wound documentation (fistula)  Edema: []None [x]Other  Last BM: PTA  Food Allergies: [x]None []Other    Anthropometrics:    Weight Loss Metrics 10/17/2017 10/3/2017 9/12/2017 8/23/2017 6/25/2017 6/25/2017 6/25/2017   Today's Wt 312 lb 2.7 oz 338 lb 338 lb 338 lb 13.6 oz - 343 lb -   BMI 53.58 kg/m2 58.02 kg/m2 58.02 kg/m2 - 58.16 kg/m2 - 58.88 kg/m2      Last 3 Recorded Weights in this Encounter    10/15/17 2118 10/16/17 0415 10/17/17 0540   Weight: 138 kg (304 lb 5 oz) 138.7 kg (305 lb 12.5 oz) 141.6 kg (312 lb 2.7 oz)      Weight Source: Bed  Height: 5' 4\" (162.6 cm),    Body mass index is 53.58 kg/(m^2).   IBW : 54.4 kg (120 lb),    Usual Body Weight: 150.4 kg (331 lb 9.2 oz) (8/9/17),      Labs:    Lab Results   Component Value Date/Time    Sodium 141 10/17/2017 06:04 AM    Potassium 2.7 10/17/2017 06:04 AM    Chloride 114 10/17/2017 06:04 AM    CO2 20 10/17/2017 06:04 AM    Glucose 131 10/17/2017 06:04 AM    BUN 32 10/17/2017 06:04 AM    Creatinine 0.94 10/17/2017 06:04 AM    Calcium 8.5 10/17/2017 06:04 AM    Magnesium 1.2 10/17/2017 06:04 AM    Phosphorus 1.3 10/17/2017 06:04 AM    Albumin 1.9 10/17/2017 06:04 AM     No results found for: HBA1C, HGBE8, [address], STF2PBIE  Lab Results   Component Value Date/Time    Glucose 131 10/17/2017 06:04 AM    Glucose (POC) 117 08/24/2017 12:11 PM      Lab Results   Component Value Date/Time    ALT (SGPT) 34 10/17/2017 06:04 AM    AST (SGOT) 25 10/17/2017 06:04 AM    Alk.  phosphatase 181 10/17/2017 06:04 AM    Bilirubin, direct 0.1 07/07/2009 06:38 PM    Bilirubin, total 0.8 10/17/2017 06:04 AM      [address]

## 2017-10-17 NOTE — CONSULTS
Palliative Medicine Consult  [facility]: [phone] ([phone])    Patient Name: Frank Almaraz  YOB: 1975    Date of Initial Consult: 10/16/17  Reason for Consult: pain management  Requesting Provider: Dr. Ivette Golden  Primary Care Physician: Eliezer Vasques MD      SUMMARY:   Frank Almaraz is a 46 y.o. with a past history of Crohn's disease, multiple surgeries s/p total colectomy with end ileostomy, 6/7/17 exp lap w SB perf, 6/28/17 exp lap, lysis of adhesions, repair of enterotomy, SMA stent on L, 7/16/17 exp lap, lysis of adhesions, fistula exclusion with feeding tube placement, frozen abdomen/ wound vac assisted closure placement, prolonged TPN, MRSA wound infection, awaiting repair of EC fistula planned for Jan 2018 if stable for surgery, who was admitted on 10/15/2017 from [facility] with a diagnosis of acute on chronic abdominal pain. Current medical issues leading to Palliative Medicine involvement include: pain management. Patient's mother, Toñito Melchor is NOK   PALLIATIVE DIAGNOSES:   1. Acute on chronic abdominal pain ? Reason for increased pain. 2. Crohn's disease ? Flare  3. Chronic anxiety and depression, chronic benzo use  4. Debility, generalized muscle wasting  5. Nausea- multifactorial       PLAN:   1. Pain - unclear etiology to change in pain. Patient and mom report that pain meds had not recently been changed at [facility]  1. Will start fentanyl patch 75mcg/hr for long acting (she likely absorbs MS Contin poorly)  2. Change - to - Dilaudid PCA NO basal rate, with 0.4mg Q8min   (she used 17 doses in past 12 hours,  0.4mg each)   3. Dilaudid 1mg IV Q3hr PRN pain not controlled by PCA  2. Nausea  1. Compazine 5mg IV Q8hrs scheduled  2. zofran 4mg IV Q6hrs scheduled  3. Anxiety - patient chronically taking klonopin twice daily. 1. Klonopin 0.25mg QAM, 1mg QHS  (I think her previous dose was 0.5 QAM and 1mg QHS)  4. Initial consult note routed to primary continuity provider  5.  Communicated plan of care with: Palliative IDT       GOALS OF CARE / TREATMENT PREFERENCES:   [====Goals of Care====]  GOALS OF CARE:  Patient / health care proxy stated goals: (pain management)  Goals not reviewed today        TREATMENT PREFERENCES:   Code Status: Full Code    Advance Care Planning:  Advance Care Planning 10/16/2017   Patient's Healthcare Decision Maker is: Legal Next of Camilo 71   Primary Decision Maker Name Izabella Coppola   Primary Decision Maker Phone Number [phone]   Primary Decision Maker Relationship to Patient Parent   Confirm Advance Directive None   Patient Would Like to Complete Advance Directive -       Other:    The palliative care team has discussed with patient / health care proxy about goals of care / treatment preferences for patient.  [====Goals of Care====]         HISTORY:     History obtained from: chart, mom, patient    CHIEF COMPLAINT: admitted with uncontrolled abdominal pain    HPI/SUBJECTIVE:    The patient is:   [x] Verbal and participatory  [] Non-participatory due to:   46year old female who is known to our service from previous admission. She reports one day of acute change in abdominal pain. She had been doing \"ok\" and was planning on seeing her pain management doctor on Wednesday of this week for recommendations about weaning. She doesn't think Jimena Gomez had changed anything recently with her pain meds. She was on 60 Q8 of MS contin, morphine PCA (1mg for a while, most recently 0.5mg PCA dose) and PRN dilaudid 1mg IV . At one point they tried getting her off the PCA, but she didn't tolerate that well because it was too many pills for her to digest, so PCA had been restarted. 10/17  The night was difficult, woke up with pain -- still needing IV dilaudid frequently. PCA use 17 doses in past 12 hours. (0.4mg each)  She used 1mg IV Dilaudid PRN about every 3-4 hours  Nausea is better but still a not good, wants some ginger ale.     Clinical Pain Assessment (nonverbal scale for severity on nonverbal patients):   [++++ Clinical Pain Assessment++++]  [++++Pain Severity++++]: Pain: 7  [++++Pain Character++++]: stabbing  [++++Pain Duration++++]: Bad for the last 24-48 hours  [++++Pain Effect++++]: nauseated  [++++Pain Factors++++]:   [++++Pain Frequency++++]: constant  [++++Pain Location++++]: all over abdomen  [++++ Clinical Pain Assessment++++]  Duration: for how long has pt been experiencing pain (e.g., 2 days, 1 month, years)  Frequency: how often pain is an issue (e.g., several times per day, once every few days, constant)     FUNCTIONAL ASSESSMENT:     Palliative Performance Scale (PPS):  PPS: 20       PSYCHOSOCIAL/SPIRITUAL SCREENING:     Advance Care Planning:  Advance Care Planning 10/16/2017   Patient's Healthcare Decision Maker is: Legal Next of Camilo 71   Primary Decision Maker Name Hill Camacho   [place] [address] [address] [facility] Phone Number [phone]   Primary Decision Maker Relationship to Patient Parent   Confirm Advance Directive None   Patient Would Like to Complete Advance Directive -        Any spiritual / Anabaptist concerns:  [] Yes /  [x] No    Caregiver Burnout:  [] Yes /  [x] No /  [] No Caregiver Present      Anticipatory grief assessment:   [x] Normal  / [] Maladaptive       ESAS Anxiety: Anxiety: 5    ESAS Depression: Depression: 5        REVIEW OF SYSTEMS:     Positive and pertinent negative findings in ROS are noted above in HPI. The following systems were [x] reviewed / [] unable to be reviewed as noted in HPI  Other findings are noted below. Systems: constitutional, ears/nose/mouth/throat, respiratory, gastrointestinal, genitourinary, musculoskeletal, integumentary, neurologic, psychiatric, endocrine. Positive findings noted below.   Modified ESAS Completed by: provider   Fatigue: 10 Drowsiness: 2   Depression: 5 Pain: 7   Anxiety: 5 Nausea: 8   Anorexia: 8 Dyspnea: 0     Constipation: No              PHYSICAL EXAM:     From RN flowsheet:  Wt Readings from Last 3 Encounters: 10/17/17 312 lb 2.7 oz (141.6 kg)   10/03/17 338 lb (153.3 kg)   09/12/17 338 lb (153.3 kg)     Blood pressure 116/67, pulse (!) 112, temperature 98.1 °F (36.7 °C), resp. rate 18, height 5' 4\" (1.626 m), weight 312 lb 2.7 oz (141.6 kg), SpO2 100 %. Pain Scale 1: Numeric (0 - 10)  Pain Intensity 1: 7  Pain Onset 1: acutte and chronic  Pain Location 1: Abdomen  Pain Orientation 1: Medial, Anterior  Pain Description 1: Aching  Pain Intervention(s) 1: Medication (see MAR)  Last bowel movement, if known:     Constitutional: pt is awake, alert, chronically ill appearing  Tearful anxious affect  No respiratory distress  Dressings not removed for exam           HISTORY:     Principal Problem:    SIRS (systemic inflammatory response syndrome) (HCC) (10/16/2017)    Active Problems:    Abdominal pain (6/25/2017)      Acute hyponatremia (10/16/2017)      Leukocytosis (10/16/2017)      Wound, open, anterior abdominal wall (10/16/2017)      DEAN (acute kidney injury) ([facility] [address] (10/16/2017)      Past Medical History:   Diagnosis Date    Crohn's disease ([facility] [address] 8/15/2011    DVT (deep venous thrombosis) ([facility] 8/15/2011    Incarcerated ventral hernia 8/15/2011    Right flank pain 8/22/2011    Seizures ([facility] [address] [facility]     Stroke [facility])       Past Surgical History:   Procedure Laterality Date    HX OTHER SURGICAL      multiple procedures related to her Crohn's disease    RI LAP, INCISIONAL HERNIA REPAIR,INCARCERATED  9-10-08    dr. Marshall Desai      Family History   Problem Relation Age of Onset    Hypertension Father     Stroke Father     Other Father      arthritis      History reviewed, no pertinent family history.   Social History   Substance Use Topics    Smoking status: Former Smoker    Smokeless tobacco: Not on file    Alcohol use No     Allergies   Allergen Reactions    Demerol [Meperidine] Unknown (comments)    Soma [Carisoprodol] Rash and Nausea Only    Sulfa (Sulfonamide Antibiotics) Unknown (comments)    Toradol [Ketorolac Tromethamine] Unknown (comments)      Current Facility-Administered Medications   Medication Dose Route Frequency    magnesium sulfate 2 g/50 ml IVPB (premix or compounded)  2 g IntraVENous ONCE    0.9% sodium chloride infusion  75 mL/hr IntraVENous CONTINUOUS    potassium chloride 10 mEq in 100 ml IVPB  10 mEq IntraVENous Q1H    clonazePAM (KlonoPIN) tablet 1 mg  1 mg Oral QHS    clonazePAM (KlonoPIN) tablet 0.25 mg  0.25 mg Oral DAILY    fentaNYL (DURAGESIC) 75 mcg/hr patch 1 Patch  1 Patch TransDERmal Q72H    sodium chloride (NS) flush 5-10 mL  5-10 mL IntraVENous Q8H    sodium chloride (NS) flush 5-10 mL  5-10 mL IntraVENous PRN    HYDROmorphone (PF) 15 mg/30 ml (DILAUDID) PCA   IntraVENous TITRATE    sodium chloride (NS) flush 5-10 mL  5-10 mL IntraVENous PRN    piperacillin-tazobactam (ZOSYN) 3.375 g in 0.9% sodium chloride (MBP/ADV) 100 mL  3.375 g IntraVENous Q8H    TPN ADULT - CENTRAL   IntraVENous CONTINUOUS    Vancomycin- pharmacy to dose   Other Rx Dosing/Monitoring    vancomycin (VANCOCIN) 1750 mg in  ml infusion  1,750 mg IntraVENous Q24H    famotidine (PF) (PEPCID) 20 mg in sodium chloride 0.9 % 10 mL injection  20 mg IntraVENous Q12H    ondansetron (ZOFRAN) injection 4 mg  4 mg IntraVENous Q6H    HYDROmorphone (PF) (DILAUDID) injection 1 mg  1 mg IntraVENous Q3H PRN    sodium chloride (NS) flush 5-10 mL  5-10 mL IntraVENous PRN          LAB AND IMAGING FINDINGS:     Lab Results   Component Value Date/Time    WBC 16.5 10/17/2017 06:04 AM    HGB 9.1 10/17/2017 06:04 AM    PLATELET 054 27/63/1146 06:04 AM     Lab Results   Component Value Date/Time    Sodium 141 10/17/2017 06:04 AM    Potassium 2.7 10/17/2017 06:04 AM    Chloride 114 10/17/2017 06:04 AM    CO2 20 10/17/2017 06:04 AM    BUN 32 10/17/2017 06:04 AM    Creatinine 0.94 10/17/2017 06:04 AM    Calcium 8.5 10/17/2017 06:04 AM    Magnesium 1.2 10/17/2017 06:04 AM    Phosphorus 1.3 10/17/2017 06:04 AM      Lab Results Component Value Date/Time    AST (SGOT) 25 10/17/2017 06:04 AM    Alk. phosphatase 181 10/17/2017 06:04 AM    Protein, total 6.1 10/17/2017 06:04 AM    Albumin 1.9 10/17/2017 06:04 AM    Globulin 4.2 10/17/2017 06:04 AM     No results found for: INR, PTMR, PTP, PT1, PT2, APTT   No results found for: IRON, FE, TIBC, IBCT, PSAT, FERR   No results found for: PH, PCO2, PO2  No components found for: GLPOC   No results found for: CPK, CKMB             Total time:   Counseling / coordination time, spent as noted above:   > 50% counseling / coordination?:     Prolonged service was provided for  []30 min   []75 min in face to face time in the presence of the patient, spent as noted above. Time Start:   Time End:   Note: this can only be billed with 75406 (initial) or 05382 (follow up). If multiple start / stop times, list each separately.

## 2017-10-17 NOTE — PROGRESS NOTES
Bedside, Verbal and Written shift change report given to Nadia (oncoming nurse) by Gurwinder Mancini (offgoing nurse). Report included the following information SBAR, Kardex, Procedure Summary, Intake/Output, MAR, Recent Results and Alarm Parameters    Gurwinder Mancini.

## 2017-10-17 NOTE — CDMP QUERY
Per addended PN (10/17): \"Morbid obesity\"-agreed    Patient is noted to have a BMI of 52.49 kg/m . Please clarify if this patient is:     =>Morbidly obese  =>Obese   =>Overweight  =>Other explanation of clinical findings  =>Unable to determine (no explanation for clinical findings)    Presentation: Weight: 138 kg (304 lb 5 oz) &   Height: 5' 4\" (1.626 m) = BMI: 52.49 kg/m     Please clarify and document your clinical opinion in the progress notes and discharge summary, including the definitive and or presumptive diagnosis, (suspected or probable), related to the above clinical findings. Please include clinical findings supporting your diagnosis.      Thank you,    Avi Robledo RN, BSN, [facility], [address] [place]  [phone]

## 2017-10-17 NOTE — PROGRESS NOTES
Subjective  Still having pain  \"Feels like my crohn's\"    Temp:  [97.5 °F (36.4 °C)-98.5 °F (36.9 °C)]   Pulse (Heart Rate):  []   BP: ()/(41-82)   Resp Rate:  [14-23]   O2 Sat (%):  [94 %-100 %]   Weight:  [304 lb 5 oz (138 kg)-312 lb 2.7 oz (141.6 kg)]   10/17 0701 - 10/17 1900  In: -   Out: 3400 [Urine:1100; Drains:2300]   10/15 1901 - 10/17 0700  In: 200 [I.V.:200]  Out: 1550 [Urine:1550]      Objective  Gen- alert in NAD  Lungs- CTA  H-RRR  Abd- S/with tenderness along the upper portions of the wound. Fistula unchanged and productive    Recent Results (from the past 24 hour(s))   METABOLIC PANEL, COMPREHENSIVE    Collection Time: 10/17/17  6:04 AM   Result Value Ref Range    Sodium 141 136 - 145 mmol/L    Potassium 2.7 (LL) 3.5 - 5.1 mmol/L    Chloride 114 (H) 97 - 108 mmol/L    CO2 20 (L) 21 - 32 mmol/L    Anion gap 7 5 - 15 mmol/L    Glucose 131 (H) 65 - 100 mg/dL    BUN 32 (H) 6 - 20 MG/DL    Creatinine 0.94 0.55 - 1.02 MG/DL    BUN/Creatinine ratio 34 (H) 12 - 20      GFR est AA >60 >60 ml/min/1.73m2    GFR est non-AA >60 >60 ml/min/1.73m2    Calcium 8.5 8.5 - 10.1 MG/DL    Bilirubin, total 0.8 0.2 - 1.0 MG/DL    ALT (SGPT) 34 12 - 78 U/L    AST (SGOT) 25 15 - 37 U/L    Alk.  phosphatase 181 (H) 45 - 117 U/L    Protein, total 6.1 (L) 6.4 - 8.2 g/dL    Albumin 1.9 (L) 3.5 - 5.0 g/dL    Globulin 4.2 (H) 2.0 - 4.0 g/dL    A-G Ratio 0.5 (L) 1.1 - 2.2     MAGNESIUM    Collection Time: 10/17/17  6:04 AM   Result Value Ref Range    Magnesium 1.2 (L) 1.6 - 2.4 mg/dL   PHOSPHORUS    Collection Time: 10/17/17  6:04 AM   Result Value Ref Range    Phosphorus 1.3 (L) 2.6 - 4.7 MG/DL   PREALBUMIN    Collection Time: 10/17/17  6:04 AM   Result Value Ref Range    Prealbumin 27.0 20.0 - 40.0 mg/dL   CBC WITH AUTOMATED DIFF    Collection Time: 10/17/17  6:04 AM   Result Value Ref Range    WBC 16.5 (H) 3.6 - 11.0 K/uL    RBC 3.28 (L) 3.80 - 5.20 M/uL    HGB 9.1 (L) 11.5 - 16.0 g/dL    HCT 27.7 (L) 35.0 - 47.0 % MCV 84.5 80.0 - 99.0 FL    MCH 27.7 26.0 - 34.0 PG    MCHC 32.9 30.0 - 36.5 g/dL    RDW 16.3 (H) 11.5 - 14.5 %    PLATELET 438 861 - 555 K/uL    NEUTROPHILS 70 32 - 75 %    BAND NEUTROPHILS 5 %    LYMPHOCYTES 10 (L) 12 - 49 %    MONOCYTES 7 5 - 13 %    EOSINOPHILS 1 0 - 7 %    BASOPHILS 0 0 - 1 %    METAMYELOCYTES 5 %    MYELOCYTES 2 %    ABS. NEUTROPHILS 12.4 (H) 1.8 - 8.0 K/UL    ABS. LYMPHOCYTES 1.7 0.8 - 3.5 K/UL    ABS. MONOCYTES 1.2 (H) 0.0 - 1.0 K/UL    ABS. EOSINOPHILS 0.2 0.0 - 0.4 K/UL    ABS. BASOPHILS 0.0 0.0 - 0.1 K/UL    DF MANUAL      PLATELET COMMENTS LARGE PLATELETS      RBC COMMENTS ANISOCYTOSIS  1+             Principal Problem:    SIRS (systemic inflammatory response syndrome) (HCC) (10/16/2017)    Active Problems:    Abdominal pain (6/25/2017)      Acute hyponatremia (10/16/2017)      Leukocytosis (10/16/2017)      Wound, open, anterior abdominal wall (10/16/2017)      DEAN (acute kidney injury) ([facility] [address] (10/16/2017)          Assessment & Plan  Labs improving  Treatment for crohns per GI. No surgical contraindication for po intake once cleared by GI  Continue TPN.

## 2017-10-17 NOTE — PROGRESS NOTES
Pharmacist Note - Vancomycin Dosing  Therapy day 2  Indication: SIRS secondary to possible intra-abdominal infection  Current regimen: 1750 mg IV Q 24 hours     Goal trough: 15 - 20 mcg/mL     Plan: Change to 1750 mg IV Q 12 hours per [facility] P&T Committee Protocol with respect to renal function. Pharmacy will continue to monitor patient daily and will make dosage adjustments based upon changing renal function.     Deb Vega, LisaD, BCPS

## 2017-10-17 NOTE — PROGRESS NOTES
Met with patient at bedside to introduce role and to assess for any needs/concerns regarding disposition. Patient was admitted from [facility] with worsening abdominal pain. She had been here at [address] [place] [address] prior to admission to [facility]. She has a long medical history that includes Crohn's disease,DVT, seizures and multiple abdominal surgeries. She lives at home with her mother Debbie Hearn [phone]. Her goal is to discharge to home following this admission. CM discussed with her the possibility of having to return to [facility] for a stay prior to discharging home. She states that would be ok. CM will await rehab recommendations and medical workup to help formulate disposition.     Care Management Interventions  Palliative Care Criteria Met (RRAT>21 & CHF Dx)?: Yes  Palliative Consult Recommended?: Yes  Mode of Transport at Discharge:  (TBD)  Transition of Care Consult (CM Consult): Discharge Planning  Current Support Network:  (Lives with mother)  Confirm Follow Up Transport:  (TBD)  Plan discussed with Pt/Family/Caregiver: Yes  Freedom of Choice Offered: Yes  Discharge Location  Discharge Placement:  (TBD [facility] vs home)     Monroe Contreras RN CRM

## 2017-10-17 NOTE — PROGRESS NOTES
Hospitalist Progress Note                                            Date of Service:  10/17/2017  NAME:  Shirley Crockett  :  1975  MRN:  [id number]      Admission Summary:   Per H&P \"The patient is a 81-year-old white   female with past medical history of Crohn disease, DVT, incarcerated   ventral hernia, chronic abdominal pain, morbid obesity, seizures,   stroke and multiple repeat abdominal surgeries, presented to the   emergency department via EMS from  [facility] with chief   complaint of abdominal pain. The patient has chronic abdominal pain. She has been on Dilaudid at [facility]. She notably has been on a PCA   according to her mother's report. In addition, she takes additional   doses of Dilaudid intermittently. Tonight she complains of stabbing,   severe generalized abdominal pain mostly in epigastrium according to   initial reports, rated at 10/10,  aggravated with any motion and   movement, without specific alleviating factors. She complains   of nausea, vomiting, chills, cough productive of clear sputum and per   the ER soreness near her vagina. She has open abdominal wound,   enterocutaneous fistula, which drains into collection bag. The patient   notes there has been some leakage around the fistula. She also has a   ileostomy and her mother notes there has been some \"blood\" seen   within the ostomy bag. The patient's recent history has been   complicated over the last several months. She reportedly had   undergone capsule endoscopy. She was subsequently admitted on   2017 secondary to retained video capsule. She underwent single   balloon enteroscopy on 2017 with iatrogenic bowel perforation   during enteroscopy.  She reportedly notably underwent diagnostic   laparoscopy converted to open exploratory laparotomy with extensive   (7-hour) lysis of adhesions with intraoperative upper endoscopy   performed and physical findings of a sealed perforated jejunum. Postoperatively the patient was noted to have MRSA wound infection   treated with IV vancomycin. She was discharged home with oral   antibiotics. The patient returned to the emergency department on   06/25/2017 with abdominal pain. She went back to OR on 06/28/2017   with this diagnosis of ischemic bowel and underwent exploratory   laparotomy, extensive lysis of adhesions with repair of enterotomy and   SMA stent on the left. Per review of chart records patient developed a   fistula  which required daily wound care, started on TPN. She was   essentially bed bound during the hospitalization and has   required multiple drains, which were eventually removed and replaced   with a wound VAC. She was eventually discharged to [facility]   with morphine PCA pump, TPN as well as additional oral pain   medications with intractable pain. During hospitalization, the patient   was seen by general surgery, vascular surgery, infectious disease,   cardiology, pulmonary  and palliative care services. The patient has   continued to require long-term skilled nursing care. She was   reportedly  recently treated for a UTI. There are no complaints   of syncope, loss of consciousness, new onset numbness,   paresthesias, slurred speech, facial droop, visual disturbance,   headache, neck pain, back pain, chest pain although at present, she   complains of chronic chest wall pain with light palpation and   hypersensitivity of her skin. She has not complained dysuria or   hematuria, increased calf pain, swelling, edema, fever, chills or rash. On arrival in emergency department tonight, initial recorded vital signs   were blood pressure 122/43, heart rate 128, respiratory rate of 20, O2   saturation is 100% on room air.  Workup included CT abdomen and   pelvis without contrast which showed large anterior abdominal wall and   site of previous known enterocutaneous fistula with mild stranding   around anterior abdominal wound as well as abdominal mesentery,   nonspecific wall thickening and inflammatory appearance of the   duodenum with nonspecific wall thickening with inflammatory   appearance of the duodenum. Labs showed elevated WBC 23,500. The patient had elevated BUN 94, creatinine of 2.26 and GFR of 24. Per the ED the patient was given Dilaudid 2 mg IV x2 doses, Zofran 8   mg IV x1 dose, Zofran 3.375 grams IV, levofloxacin 750 mg IV,   vancomycin 2000 mg IV. Blood cultures have been sent. The patient is   now seen for admission to the hospitalist service for continued   evaluation and treatments. The patient has already been seen by   general surgeon in consultation\". Interval history / Subjective:   Feels unwell today, states she is nauseous, abdominal pain is unchanged, no dyspnea, no emesis. No fever/chills. Having IV access issues with the multiple continuous infusions she is requiring. Drowsy during my interview     Assessment & Plan:   Acute on chronic abdominal pain: (POA) the etiology is unclear. This may be a Crohn's flare  -CT abdomen and pelvis showed arge anterior abdominal wound, and a site of previous/known enterocutaneous fistula though this is difficult to assess given lack of oral and IV contrastmaterial. Mild stranding around the anterior abdominal wound as well as in the abdominal mesentery. Nonspecific wall thickening/inflammatory appearance of the duodenum.  -Not clear what caused this acute worsening of the pain. Chron's flare possible.  -palliative care consulted, recs appreciated. 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 Wound care team following.     Systemic inflammatory response syndrome (POA)   -likely due to Crohn's Intra-abdominal infection possible  -stop levofloxacin  -continue vancomycin and Zosyn for now    Hyponatremia Improved with fluids     Hypokalemia, hypomagnesemia, hypophosphatemia: needs aggressive repletion; may be re-feeding. Access issues limiting repletion. Discussed with pharmacy - will adjust TPN content and timing. Will also look into changing double lumen PICC to triple port    DEAN pre-renal, improved with fluids    Mildly elevated liver function tests (AST). -Hypoperfusion as reflected other DEAN and hyponatremia in the setting of extensive intra abdominal complications  -Improving with hydration. Monitor. Chronic pain syndrome     Morbid obesity    Diet: TPN  Code status: full  DVT prophylaxis: scd  Care Plan discussed with: patient, RN    Hospital Problems  Date Reviewed: 10/16/2017          Codes Class Noted POA    Acute hyponatremia ICD-10-CM: E87.1  ICD-9-CM: 276.1  10/16/2017 Unknown        Leukocytosis ICD-10-CM: D72.829  ICD-9-CM: 288.60  10/16/2017 Unknown        Wound, open, anterior abdominal wall ICD-10-CM: S31.109A  ICD-9-CM: 879.2  10/16/2017 Unknown        * (Principal)SIRS (systemic inflammatory response syndrome) (HCC) ICD-10-CM: R65.10  ICD-9-CM: 995.90  10/16/2017 Unknown        DEAN (acute kidney injury) ([facility] [address] ICD-10-CM: N17.9  ICD-9-CM: 584.9  10/16/2017 Unknown        Abdominal pain ICD-10-CM: R10.9  ICD-9-CM: 789.00  6/25/2017 Unknown                Review of Systems:   Pertinent items are noted in HPI. Physical Examination:      Last 24hrs VS reviewed since prior progress note.  Most recent are:  Visit Vitals    /67    Pulse (!) 112    Temp 98.1 °F (36.7 °C)    Resp 18    Ht 5' 4\" (1.626 m)    Wt 141.6 kg (312 lb 2.7 oz)    SpO2 100%    BMI 53.58 kg/m2           Constitutional:  Drowsy, NAD   ENT:  anicteric sclerae, pale conjunctiva, MMM       Resp:  CTA b/l, normal work of breathing   CV:  RRR, no MRG, no JVD, no peripheral edema    GI:  wounds dressed, bag over fistula draining yellow fluid, abd is obese otherwise   :          Musculoskeletal:  No edema, warm,    Neurologic: drowsy and falls asleep during interview but easily awakened, CN grossly intact, normal speech, moves all extremities              Intake/Output Summary (Last 24 hours) at 10/17/17 1006  Last data filed at 10/17/17 0942   Gross per 24 hour   Intake                0 ml   Output             3090 ml   Net            -3090 ml          Data Review:    Review and/or order of clinical lab test  Review and/or order of tests in the radiology section of CPT  Review and/or order of tests in the medicine section of CPT      Labs:     Recent Labs      10/17/17   0604  10/16/17   0518   WBC  16.5*  19.2*   HGB  9.1*  10.9*   HCT  27.7*  32.2*   PLT  283  344     Recent Labs      10/17/17   0604  10/16/17   1134  10/16/17   0518   NA  141  135*  130*   K  2.7*  3.3*  2.7*   CL  114*  104  98   CO2  20*  21  21   BUN  32*  64*  79*   CREA  0.94  1.49*  1.88*   GLU  131*  124*  106*   CA  8.5  10.0  10.5*   MG  1.2*   --   1.6   PHOS  1.3*   --    --      Recent Labs      10/17/17   0604  10/16/17   0518  10/15/17   2139   SGOT  25  46*  47*   ALT  34  56  66   AP  181*  297*  342*   TBILI  0.8  0.8  0.7   TP  6.1*  8.1  9.1*   ALB  1.9*  2.7*  3.1*   GLOB  4.2*  5.4*  6.0*     No results for input(s): INR, PTP, APTT in the last 72 hours. No lab exists for component: INREXT, INREXT   No results for input(s): FE, TIBC, PSAT, FERR in the last 72 hours. No results found for: FOL, RBCF   No results for input(s): PH, PCO2, PO2 in the last 72 hours. No results for input(s): CPK, CKNDX, TROIQ in the last 72 hours.     No lab exists for component: CPKMB  No results found for: CHOL, CHOLX, CHLST, CHOLV, HDL, LDL, LDLC, DLDLP, TGLX, TRIGL, TRIGP, CHHD, CHHDX  Lab Results   Component Value Date/Time    Glucose (POC) 117 08/24/2017 12:11 PM    Glucose (POC) 105 08/23/2017 11:04 PM    Glucose (POC) 112 08/23/2017 11:28 AM    Glucose (POC) 116 08/22/2017 09:12 PM    Glucose (POC) 135 08/22/2017 11:37 AM     Lab Results   Component Value Date/Time    Color YELLOW/STRAW 10/15/2017 11:02 PM    Appearance CLOUDY 10/15/2017 11:02 PM    Specific gravity 1.019 10/15/2017 11:02 PM    Specific gravity 1.020 07/07/2009 07:20 PM    pH (UA) 5.5 10/15/2017 11:02 PM    Protein TRACE 10/15/2017 11:02 PM    Glucose NEGATIVE  10/15/2017 11:02 PM    Ketone NEGATIVE  10/15/2017 11:02 PM    Bilirubin NEGATIVE  10/15/2017 11:02 PM    Urobilinogen 0.2 10/15/2017 11:02 PM    Nitrites NEGATIVE  10/15/2017 11:02 PM    Leukocyte Esterase MODERATE 10/15/2017 11:02 PM    Epithelial cells FEW 10/15/2017 11:02 PM    Bacteria NEGATIVE  10/15/2017 11:02 PM    WBC 5-10 10/15/2017 11:02 PM    RBC 5-10 10/15/2017 11:02 PM         Medications Reviewed:     Current Facility-Administered Medications   Medication Dose Route Frequency    magnesium sulfate 2 g/50 ml IVPB (premix or compounded)  2 g IntraVENous ONCE    0.9% sodium chloride infusion  75 mL/hr IntraVENous CONTINUOUS    potassium chloride 10 mEq in 100 ml IVPB  10 mEq IntraVENous Q1H    clonazePAM (KlonoPIN) tablet 1 mg  1 mg Oral QHS    clonazePAM (KlonoPIN) tablet 0.25 mg  0.25 mg Oral DAILY    fentaNYL (DURAGESIC) 75 mcg/hr patch 1 Patch  1 Patch TransDERmal Q72H    potassium phosphate 30 mmol in 0.9% sodium chloride 250 mL infusion   IntraVENous ONCE    sodium chloride (NS) flush 5-10 mL  5-10 mL IntraVENous Q8H    sodium chloride (NS) flush 5-10 mL  5-10 mL IntraVENous PRN    HYDROmorphone (PF) 15 mg/30 ml (DILAUDID) PCA   IntraVENous TITRATE    sodium chloride (NS) flush 5-10 mL  5-10 mL IntraVENous PRN    piperacillin-tazobactam (ZOSYN) 3.375 g in 0.9% sodium chloride (MBP/ADV) 100 mL  3.375 g IntraVENous Q8H    TPN ADULT - CENTRAL   IntraVENous CONTINUOUS    Vancomycin- pharmacy to dose   Other Rx Dosing/Monitoring    vancomycin (VANCOCIN) 1750 mg in  ml infusion  1,750 mg IntraVENous Q24H    famotidine (PF) (PEPCID) 20 mg in sodium chloride 0.9 % 10 mL injection  20 mg IntraVENous Q12H    ondansetron (ZOFRAN) injection 4 mg  4 mg IntraVENous Q6H    HYDROmorphone (PF) (DILAUDID) injection 1 mg  1 mg IntraVENous Q3H PRN    sodium chloride (NS) flush 5-10 mL  5-10 mL IntraVENous PRN     ______________________________________________________________________  EXPECTED LENGTH OF STAY: 12d 12h  ACTUAL LENGTH OF STAY:          1                 Annia Medley MD

## 2017-10-17 NOTE — PROGRESS NOTES
Problem: Falls - Risk of  Goal: *Absence of Falls  Document Marlene Fall Risk and appropriate interventions in the flowsheet. Outcome: Progressing Towards Goal  Fall Risk Interventions:  Mobility Interventions: OT consult for ADLs, Strengthening exercises (ROM-active/passive)           Medication Interventions: Evaluate medications/consider consulting pharmacy, Patient to call before getting OOB, Teach patient to arise slowly     Elimination Interventions: Call light in reach                 Comments:   Pt will use call bell, rise slowly if she needs to sit up. Reorient while using PCA.

## 2017-10-18 LAB
ALBUMIN SERPL-MCNC: 2.4 G/DL (ref 3.5–5)
ALBUMIN/GLOB SERPL: 0.5 {RATIO} (ref 1.1–2.2)
ALP SERPL-CCNC: 199 U/L (ref 45–117)
ALT SERPL-CCNC: 37 U/L (ref 12–78)
ANION GAP SERPL CALC-SCNC: 9 MMOL/L (ref 5–15)
AST SERPL-CCNC: 23 U/L (ref 15–37)
BASOPHILS # BLD: 0 K/UL (ref 0–0.1)
BASOPHILS NFR BLD: 0 % (ref 0–1)
BILIRUB SERPL-MCNC: 0.3 MG/DL (ref 0.2–1)
BUN SERPL-MCNC: 32 MG/DL (ref 6–20)
BUN/CREAT SERPL: 32 (ref 12–20)
CALCIUM SERPL-MCNC: 10.4 MG/DL (ref 8.5–10.1)
CHLORIDE SERPL-SCNC: 110 MMOL/L (ref 97–108)
CO2 SERPL-SCNC: 17 MMOL/L (ref 21–32)
CREAT SERPL-MCNC: 1 MG/DL (ref 0.55–1.02)
DIFFERENTIAL METHOD BLD: ABNORMAL
EOSINOPHIL # BLD: 0 K/UL (ref 0–0.4)
EOSINOPHIL NFR BLD: 0 % (ref 0–7)
ERYTHROCYTE [DISTWIDTH] IN BLOOD BY AUTOMATED COUNT: 16.8 % (ref 11.5–14.5)
ERYTHROCYTE [DISTWIDTH] IN BLOOD BY AUTOMATED COUNT: NORMAL % (ref 11.5–14.5)
GLOBULIN SER CALC-MCNC: 4.9 G/DL (ref 2–4)
GLUCOSE BLD STRIP.AUTO-MCNC: 119 MG/DL (ref 65–100)
GLUCOSE BLD STRIP.AUTO-MCNC: 127 MG/DL (ref 65–100)
GLUCOSE BLD STRIP.AUTO-MCNC: 149 MG/DL (ref 65–100)
GLUCOSE BLD STRIP.AUTO-MCNC: 225 MG/DL (ref 65–100)
GLUCOSE SERPL-MCNC: 237 MG/DL (ref 65–100)
HCT VFR BLD AUTO: 30.3 % (ref 35–47)
HCT VFR BLD AUTO: NORMAL % (ref 35–47)
HGB BLD-MCNC: 9.8 G/DL (ref 11.5–16)
HGB BLD-MCNC: NORMAL G/DL (ref 11.5–16)
LYMPHOCYTES # BLD: 1 K/UL (ref 0.8–3.5)
LYMPHOCYTES NFR BLD: 5 % (ref 12–49)
MAGNESIUM SERPL-MCNC: 1.9 MG/DL (ref 1.6–2.4)
MCH RBC QN AUTO: 27.7 PG (ref 26–34)
MCH RBC QN AUTO: NORMAL PG (ref 26–34)
MCHC RBC AUTO-ENTMCNC: 32.3 G/DL (ref 30–36.5)
MCHC RBC AUTO-ENTMCNC: NORMAL G/DL (ref 30–36.5)
MCV RBC AUTO: 85.6 FL (ref 80–99)
MCV RBC AUTO: NORMAL FL (ref 80–99)
METAMYELOCYTES NFR BLD MANUAL: 1 %
MONOCYTES # BLD: 1 K/UL (ref 0–1)
MONOCYTES NFR BLD: 5 % (ref 5–13)
MYELOCYTES NFR BLD MANUAL: 2 %
NEUTS SEG # BLD: 18.2 K/UL (ref 1.8–8)
NEUTS SEG NFR BLD: 87 % (ref 32–75)
PHOSPHATE SERPL-MCNC: 3.8 MG/DL (ref 2.6–4.7)
PLATELET # BLD AUTO: 294 K/UL (ref 150–400)
PLATELET # BLD AUTO: NORMAL K/UL (ref 150–400)
POTASSIUM SERPL-SCNC: 4.7 MMOL/L (ref 3.5–5.1)
PROT SERPL-MCNC: 7.3 G/DL (ref 6.4–8.2)
RBC # BLD AUTO: 3.54 M/UL (ref 3.8–5.2)
RBC # BLD AUTO: NORMAL M/UL (ref 3.8–5.2)
RBC MORPH BLD: ABNORMAL
SERVICE CMNT-IMP: ABNORMAL
SODIUM SERPL-SCNC: 136 MMOL/L (ref 136–145)
WBC # BLD AUTO: 20.9 K/UL (ref 3.6–11)
WBC # BLD AUTO: NORMAL K/UL (ref 3.6–11)

## 2017-10-18 PROCEDURE — 74011000250 HC RX REV CODE- 250: Performed by: NURSE PRACTITIONER

## 2017-10-18 PROCEDURE — 74011000250 HC RX REV CODE- 250: Performed by: INTERNAL MEDICINE

## 2017-10-18 PROCEDURE — 74011250636 HC RX REV CODE- 250/636: Performed by: HOSPITALIST

## 2017-10-18 PROCEDURE — 74011250636 HC RX REV CODE- 250/636: Performed by: FAMILY MEDICINE

## 2017-10-18 PROCEDURE — 74011636637 HC RX REV CODE- 636/637: Performed by: HOSPITALIST

## 2017-10-18 PROCEDURE — 74011250637 HC RX REV CODE- 250/637: Performed by: HOSPITALIST

## 2017-10-18 PROCEDURE — 74011000250 HC RX REV CODE- 250: Performed by: HOSPITALIST

## 2017-10-18 PROCEDURE — 85025 COMPLETE CBC W/AUTO DIFF WBC: CPT | Performed by: HOSPITALIST

## 2017-10-18 PROCEDURE — 84100 ASSAY OF PHOSPHORUS: CPT | Performed by: HOSPITALIST

## 2017-10-18 PROCEDURE — 74011250636 HC RX REV CODE- 250/636: Performed by: INTERNAL MEDICINE

## 2017-10-18 PROCEDURE — 82962 GLUCOSE BLOOD TEST: CPT

## 2017-10-18 PROCEDURE — 83735 ASSAY OF MAGNESIUM: CPT | Performed by: HOSPITALIST

## 2017-10-18 PROCEDURE — 65270000029 HC RM PRIVATE

## 2017-10-18 PROCEDURE — 36415 COLL VENOUS BLD VENIPUNCTURE: CPT | Performed by: HOSPITALIST

## 2017-10-18 PROCEDURE — 74011250636 HC RX REV CODE- 250/636: Performed by: PHYSICIAN ASSISTANT

## 2017-10-18 PROCEDURE — 74011000258 HC RX REV CODE- 258: Performed by: FAMILY MEDICINE

## 2017-10-18 PROCEDURE — 74011250637 HC RX REV CODE- 250/637: Performed by: PHYSICAL MEDICINE & REHABILITATION

## 2017-10-18 PROCEDURE — 80053 COMPREHEN METABOLIC PANEL: CPT | Performed by: HOSPITALIST

## 2017-10-18 PROCEDURE — 74011000258 HC RX REV CODE- 258: Performed by: HOSPITALIST

## 2017-10-18 RX ORDER — MORPHINE SULFATE 20 MG/ML
20 SOLUTION ORAL
Status: DISCONTINUED | OUTPATIENT
Start: 2017-10-18 | End: 2017-10-19

## 2017-10-18 RX ORDER — MAGNESIUM SULFATE 100 %
4 CRYSTALS MISCELLANEOUS AS NEEDED
Status: DISCONTINUED | OUTPATIENT
Start: 2017-10-18 | End: 2017-10-27 | Stop reason: HOSPADM

## 2017-10-18 RX ORDER — INSULIN LISPRO 100 [IU]/ML
INJECTION, SOLUTION INTRAVENOUS; SUBCUTANEOUS EVERY 6 HOURS
Status: DISCONTINUED | OUTPATIENT
Start: 2017-10-18 | End: 2017-10-20

## 2017-10-18 RX ORDER — INSULIN LISPRO 100 [IU]/ML
INJECTION, SOLUTION INTRAVENOUS; SUBCUTANEOUS EVERY 6 HOURS
Status: DISCONTINUED | OUTPATIENT
Start: 2017-10-18 | End: 2017-10-18

## 2017-10-18 RX ORDER — SODIUM CHLORIDE 9 MG/ML
75 INJECTION, SOLUTION INTRAVENOUS CONTINUOUS
Status: DISCONTINUED | OUTPATIENT
Start: 2017-10-18 | End: 2017-10-21

## 2017-10-18 RX ORDER — DEXTROSE 50 % IN WATER (D50W) INTRAVENOUS SYRINGE
12.5-25 AS NEEDED
Status: DISCONTINUED | OUTPATIENT
Start: 2017-10-18 | End: 2017-10-27 | Stop reason: HOSPADM

## 2017-10-18 RX ORDER — FENTANYL 100 UG/H
1 PATCH TRANSDERMAL
Status: DISCONTINUED | OUTPATIENT
Start: 2017-10-18 | End: 2017-10-19

## 2017-10-18 RX ORDER — INSULIN LISPRO 100 [IU]/ML
INJECTION, SOLUTION INTRAVENOUS; SUBCUTANEOUS
Status: DISCONTINUED | OUTPATIENT
Start: 2017-10-18 | End: 2017-10-18

## 2017-10-18 RX ADMIN — MORPHINE SULFATE 20 MG: 20 SOLUTION ORAL at 11:47

## 2017-10-18 RX ADMIN — Medication 10 ML: at 12:39

## 2017-10-18 RX ADMIN — ONDANSETRON 4 MG: 2 INJECTION INTRAMUSCULAR; INTRAVENOUS at 00:17

## 2017-10-18 RX ADMIN — HYDROMORPHONE HYDROCHLORIDE 1 MG: 1 INJECTION, SOLUTION INTRAMUSCULAR; INTRAVENOUS; SUBCUTANEOUS at 03:20

## 2017-10-18 RX ADMIN — METHYLPREDNISOLONE SODIUM SUCCINATE 20 MG: 40 INJECTION, POWDER, FOR SOLUTION INTRAMUSCULAR; INTRAVENOUS at 18:29

## 2017-10-18 RX ADMIN — METHYLPREDNISOLONE SODIUM SUCCINATE 20 MG: 40 INJECTION, POWDER, FOR SOLUTION INTRAMUSCULAR; INTRAVENOUS at 11:36

## 2017-10-18 RX ADMIN — INSULIN LISPRO 2 UNITS: 100 INJECTION, SOLUTION INTRAVENOUS; SUBCUTANEOUS at 21:59

## 2017-10-18 RX ADMIN — METHYLPREDNISOLONE SODIUM SUCCINATE 20 MG: 40 INJECTION, POWDER, FOR SOLUTION INTRAMUSCULAR; INTRAVENOUS at 00:17

## 2017-10-18 RX ADMIN — CLONAZEPAM 0.5 MG: 0.5 TABLET ORAL at 09:26

## 2017-10-18 RX ADMIN — Medication 10 ML: at 22:00

## 2017-10-18 RX ADMIN — MORPHINE SULFATE 20 MG: 20 SOLUTION ORAL at 19:04

## 2017-10-18 RX ADMIN — MORPHINE SULFATE 20 MG: 20 SOLUTION ORAL at 16:01

## 2017-10-18 RX ADMIN — I.V. FAT EMULSION 500 ML: 20 EMULSION INTRAVENOUS at 18:37

## 2017-10-18 RX ADMIN — SODIUM CHLORIDE 5 MG: 9 INJECTION INTRAMUSCULAR; INTRAVENOUS; SUBCUTANEOUS at 18:29

## 2017-10-18 RX ADMIN — HYDROMORPHONE HYDROCHLORIDE 1 MG: 1 INJECTION, SOLUTION INTRAMUSCULAR; INTRAVENOUS; SUBCUTANEOUS at 09:27

## 2017-10-18 RX ADMIN — Medication 10 ML: at 14:35

## 2017-10-18 RX ADMIN — Medication: at 15:21

## 2017-10-18 RX ADMIN — HYDROMORPHONE HYDROCHLORIDE 1 MG: 1 INJECTION, SOLUTION INTRAMUSCULAR; INTRAVENOUS; SUBCUTANEOUS at 22:42

## 2017-10-18 RX ADMIN — HYDROMORPHONE HYDROCHLORIDE 1 MG: 1 INJECTION, SOLUTION INTRAMUSCULAR; INTRAVENOUS; SUBCUTANEOUS at 12:38

## 2017-10-18 RX ADMIN — Medication 10 ML: at 05:12

## 2017-10-18 RX ADMIN — FAMOTIDINE 20 MG: 10 INJECTION, SOLUTION INTRAVENOUS at 09:27

## 2017-10-18 RX ADMIN — VANCOMYCIN HYDROCHLORIDE 1750 MG: 10 INJECTION, POWDER, LYOPHILIZED, FOR SOLUTION INTRAVENOUS at 05:12

## 2017-10-18 RX ADMIN — PIPERACILLIN SODIUM,TAZOBACTAM SODIUM 3.38 G: 3; .375 INJECTION, POWDER, FOR SOLUTION INTRAVENOUS at 00:00

## 2017-10-18 RX ADMIN — CLONAZEPAM 0.5 MG: 0.5 TABLET ORAL at 21:39

## 2017-10-18 RX ADMIN — RETINOL, ERGOCALCIFEROL, .ALPHA.-TOCOPHEROL ACETATE, DL-, PHYTONADIONE, ASCORBIC ACID, NIACINAMIDE, RIBOFLAVIN 5-PHOSPHATE SODIUM, THIAMINE HYDROCHLORIDE, PYRIDOXINE HYDROCHLORIDE, DEXPANTHENOL, BIOTIN, FOLIC ACID, AND CYANOCOBALAMIN: KIT at 18:34

## 2017-10-18 RX ADMIN — HYDROMORPHONE HYDROCHLORIDE 1 MG: 1 INJECTION, SOLUTION INTRAMUSCULAR; INTRAVENOUS; SUBCUTANEOUS at 19:31

## 2017-10-18 RX ADMIN — ONDANSETRON 4 MG: 2 INJECTION INTRAMUSCULAR; INTRAVENOUS at 18:29

## 2017-10-18 RX ADMIN — HYDROMORPHONE HYDROCHLORIDE 1 MG: 1 INJECTION, SOLUTION INTRAMUSCULAR; INTRAVENOUS; SUBCUTANEOUS at 16:31

## 2017-10-18 RX ADMIN — PIPERACILLIN SODIUM,TAZOBACTAM SODIUM 3.38 G: 3; .375 INJECTION, POWDER, FOR SOLUTION INTRAVENOUS at 06:35

## 2017-10-18 RX ADMIN — HYDROMORPHONE HYDROCHLORIDE 1 MG: 1 INJECTION, SOLUTION INTRAMUSCULAR; INTRAVENOUS; SUBCUTANEOUS at 06:35

## 2017-10-18 RX ADMIN — ONDANSETRON 4 MG: 2 INJECTION INTRAMUSCULAR; INTRAVENOUS at 11:36

## 2017-10-18 RX ADMIN — METHYLPREDNISOLONE SODIUM SUCCINATE 20 MG: 40 INJECTION, POWDER, FOR SOLUTION INTRAMUSCULAR; INTRAVENOUS at 05:12

## 2017-10-18 RX ADMIN — SODIUM CHLORIDE 5 MG: 9 INJECTION INTRAMUSCULAR; INTRAVENOUS; SUBCUTANEOUS at 05:12

## 2017-10-18 RX ADMIN — SODIUM CHLORIDE 75 ML/HR: 900 INJECTION, SOLUTION INTRAVENOUS at 11:40

## 2017-10-18 RX ADMIN — ONDANSETRON 4 MG: 2 INJECTION INTRAMUSCULAR; INTRAVENOUS at 05:11

## 2017-10-18 RX ADMIN — MORPHINE SULFATE 20 MG: 20 SOLUTION ORAL at 21:59

## 2017-10-18 RX ADMIN — HYDROMORPHONE HYDROCHLORIDE 1 MG: 1 INJECTION, SOLUTION INTRAMUSCULAR; INTRAVENOUS; SUBCUTANEOUS at 00:17

## 2017-10-18 RX ADMIN — FAMOTIDINE 20 MG: 10 INJECTION, SOLUTION INTRAVENOUS at 21:32

## 2017-10-18 RX ADMIN — Medication 10 ML: at 11:39

## 2017-10-18 RX ADMIN — PIPERACILLIN SODIUM,TAZOBACTAM SODIUM 3.38 G: 3; .375 INJECTION, POWDER, FOR SOLUTION INTRAVENOUS at 21:24

## 2017-10-18 NOTE — PROGRESS NOTES
Autumn Lewis TRANSFER - IN REPORT:    Verbal report received from [address] Martínez Borrego RN(name) on [address]  being received from [facility]) for routine progression of care      Report consisted of patients Situation, Background, Assessment and   Recommendations(SBAR). Information from the following report(s) SBAR and Kardex was reviewed with the receiving nurse. Opportunity for questions and clarification was provided. Assessment completed upon patients arrival to unit and care assumed.

## 2017-10-18 NOTE — PROGRESS NOTES
1930 - Bedside and Verbal shift change report given to danny toure (oncoming nurse) by René chavira (offgoing nurse). Report included the following information SBAR, Kardex, Intake/Output, MAR, Recent Results and Cardiac Rhythm SR.   2330 - Bedside and Verbal shift change report given to violeta (oncoming nurse) by Jamarcus Bae (offgoing nurse).  Report included the following information SBAR, Kardex, Intake/Output, MAR, Recent Results and Cardiac Rhythm SR.

## 2017-10-18 NOTE — CONSULTS
Palliative Medicine Consult  [place]ath: [phone] ([phone])    Patient Name: Teresita Ospina  YOB: 1975    Date of Initial Consult: 10/16/17  Reason for Consult: pain management  Requesting Provider: Dr. Filipe Toney  Primary Care Physician: Eliezer Vasques MD      SUMMARY:   Teresita Ospina is a 46 y.o. with a past history of Crohn's disease, multiple surgeries s/p total colectomy with end ileostomy, 6/7/17 exp lap w SB perf, 6/28/17 exp lap, lysis of adhesions, repair of enterotomy, SMA stent on L, 7/16/17 exp lap, lysis of adhesions, fistula exclusion with feeding tube placement, frozen abdomen/ wound vac assisted closure placement, prolonged TPN, MRSA wound infection, awaiting repair of EC fistula planned for Jan 2018 if stable for surgery, who was admitted on 10/15/2017 from [facility] with a diagnosis of acute on chronic abdominal pain. CT abd/pelvis done. Thought to be from Crohn's flare and GI started on steroids. Has not been on her home Crohn's medication since earlier this year due to recurrent infections. Current medical issues leading to Palliative Medicine involvement include: pain management. Patient's mother, Yong Al is NOK   PALLIATIVE DIAGNOSES:   1. Acute on chronic abdominal pain ? Reason for increased pain. 2. Crohn's disease ? Flare  3. Chronic anxiety and depression, chronic benzo use  4. Debility, generalized muscle wasting  5. Nausea- multifactorial       PLAN:   1. Pain - unclear etiology to change in pain. Patient and mom report that pain meds had not recently been changed at [facility]. Thought to be Crohn's flare, GI started on steroids. Had tried Fentanyl patch [facility]mcg at [facility] w/out benefit, so stopped but we are concerned that she is absorbing the MSContin poorly. 1. Incr fentanyl patch to 100 mcg/hr for long acting (she likely absorbs MS Contin poorly)  2. Continue Dilaudid PCA NO basal rate, with 0.4mg Q8min   (she used 6.8mg in past 8h)  3.  Trial of Morphine concentrated liquid, will place as 20mg po every 3h prn severe pain- please try to use this before the IV Dilaudid (which I will continue) and do not give within 30 min of each other. 4. Dilaudid 1mg IV every 3hr PRN pain not controlled by PCA  5. Pt sees Dr Denise Echols ([facility]) for chronic pain management. She knows she cannot go home on the PCA so this trial of Fentanyl patch incr and liquid morphine is a step towards that. For pt's anxiety and opioid tolerance- I am still keeping the PCA for now. Will need to be weaned very slowly. 2. Nausea  1. Compazine 5mg IV Q8hrs scheduled  2. zofran 4mg IV Q6hrs scheduled  3. Anxiety - patient chronically taking klonopin twice daily. 4. Continue Klonipin now at 0.5mg bid. 5. Communicated plan of care with: Palliative IDT; Dr Don Barahona ; April Jackson RN       GOALS OF CARE / TREATMENT PREFERENCES:   [====Goals of Care====]  GOALS OF CARE:  Patient / health care proxy stated goals: (pain management)  Goals not reviewed today        TREATMENT PREFERENCES:   Code Status: Full Code    Advance Care Planning:  Advance Care Planning 10/16/2017   Patient's Healthcare Decision Maker is: Legal Next of Camilo Jiménez   Primary Decision Maker Name Izabella Coppola   Primary Decision Maker Phone Number [phone]   Primary Decision Maker Relationship to Patient Parent   Confirm Advance Directive None   Patient Would Like to Complete Advance Directive -       Other:    The palliative care team has discussed with patient / health care proxy about goals of care / treatment preferences for patient.  [====Goals of Care====]         HISTORY:   n    HPI/SUBJECTIVE:    The patient is:   [x] Verbal and participatory  [] Non-participatory due to:   46year old female who is known to our service from previous admission. She reports one day of acute change in abdominal pain.   She had been doing \"ok\" and was planning on seeing her pain management doctor on Wednesday of this week for recommendations about weaning. She doesn't think Brook Love had changed anything recently with her pain meds. She was on 60 Q8 of MS contin, morphine PCA (1mg for a while, most recently 0.5mg PCA dose) and PRN dilaudid 1mg IV . At one point they tried getting her off the PCA, but she didn't tolerate that well because it was too many pills for her to digest, so PCA had been restarted. 10/18 Pt does not feel Fentanyl patch working. Hopeful to go home soon and realizes that she cannot go home on PCA. I think might do well w/ liquid morphine compared to her previous po home regimen. 10/17  The night was difficult, woke up with pain -- still needing IV dilaudid frequently. PCA use 17 doses in past 12 hours. (0.4mg each)  She used 1mg IV Dilaudid PRN about every 3-4 hours  Nausea is better but still a not good, wants some ginger ale. Clinical Pain Assessment (nonverbal scale for severity on nonverbal patients):   [++++ Clinical Pain Assessment++++]  [++++Pain Severity++++]: Pain: 7  [++++Pain Character++++]: stabbing  [++++Pain Duration++++]:   Bad for the last 24-48 hours  [++++Pain Effect++++]: nauseated  [++++Pain Factors++++]:   [++++Pain Frequency++++]: constant  [++++Pain Location++++]: all over abdomen  [++++ Clinical Pain Assessment++++]  Duration: for how long has pt been experiencing pain (e.g., 2 days, 1 month, years)  Frequency: how often pain is an issue (e.g., several times per day, once every few days, constant)     FUNCTIONAL ASSESSMENT:     Palliative Performance Scale (PPS):  PPS: 20       PSYCHOSOCIAL/SPIRITUAL SCREENING:     Advance Care Planning:  Advance Care Planning 10/16/2017   Patient's Healthcare Decision Maker is: Legal Next of Kin   Primary Decision Maker Name Colleen Aquino   [place] [address] [address] [facility] Phone Number [phone]   Primary Decision Maker Relationship to Patient Parent   Confirm Advance Directive None   Patient Would Like to Complete Advance Directive -        Any spiritual / Yazidi concerns:  [] Yes /  [x] No    Caregiver Burnout:  [] Yes /  [x] No /  [] No Caregiver Present      Anticipatory grief assessment:   [x] Normal  / [] Maladaptive       ESAS Anxiety: Anxiety: 5    ESAS Depression: Depression: 5        REVIEW OF SYSTEMS:     Positive and pertinent negative findings in ROS are noted above in HPI. The following systems were [x] reviewed / [] unable to be reviewed as noted in HPI  Other findings are noted below. Systems: constitutional, ears/nose/mouth/throat, respiratory, gastrointestinal, genitourinary, musculoskeletal, integumentary, neurologic, psychiatric, endocrine. Positive findings noted below. Modified ESAS Completed by: provider   Fatigue: 10 Drowsiness: 2   Depression: 5 Pain: 7   Anxiety: 5 Nausea: 8   Anorexia: 8 Dyspnea: 0     Constipation: No              PHYSICAL EXAM:     From RN flowsheet:  Wt Readings from Last 3 Encounters:   10/18/17 317 lb 3.9 oz (143.9 kg)   10/03/17 338 lb (153.3 kg)   09/12/17 338 lb (153.3 kg)     Blood pressure 130/70, pulse 100, temperature 98.2 °F (36.8 °C), resp. rate 20, height 5' 4\" (1.626 m), weight 317 lb 3.9 oz (143.9 kg), SpO2 100 %. Pain Scale 1: Numeric (0 - 10)  Pain Intensity 1: 6  Pain Onset 1: chronic  Pain Location 1: Abdomen  Pain Orientation 1: Medial, Anterior  Pain Description 1: Aching  Pain Intervention(s) 1: Medication (see MAR)  Last bowel movement, if known: today     Constitutional: pt is awake, alert, chronically ill appearing, anxious but not tearful today  Eyes: Clear  ENT: moist mucous membranes, nares patent  Chest: Regular rhythm   Resp: CTA, unlabored  Abd: Mult wounds w/ leakage from EC fistula. +BS. TTP.    Msk: No abnormalities  Neuro: moving all extremities   Psych: anxious            HISTORY:     Principal Problem:    SIRS (systemic inflammatory response syndrome) (HCC) (10/16/2017)    Active Problems:    Abdominal pain (6/25/2017)      Acute hyponatremia (10/16/2017)      Leukocytosis (10/16/2017) Wound, open, anterior abdominal wall (10/16/2017)      DEAN [facility]acute kidney injury) ([facility] (10/16/2017)      Past Medical History:   Diagnosis Date    Crohn's disease ([facility] 8/15/2011    DVT (deep venous thrombosis) ([facility] 8/15/2011    Incarcerated ventral hernia 8/15/2011    Right flank pain 8/22/2011    Seizures ([facility]     Stroke [facility])       Past Surgical History:   Procedure Laterality Date    HX OTHER SURGICAL      multiple procedures related to her Crohn's disease    AR LAP, INCISIONAL HERNIA REPAIR,INCARCERATED  9-10-08    dr. Amarjit Dela Cruz      Family History   Problem Relation Age of Onset    Hypertension Father     Stroke Father     Other Father      arthritis      History reviewed, no pertinent family history.   Social History   Substance Use Topics    Smoking status: Former Smoker    Smokeless tobacco: Not on file    Alcohol use No     Allergies   Allergen Reactions    Demerol [Meperidine] Unknown (comments)    Soma [Carisoprodol] Rash and Nausea Only    Sulfa (Sulfonamide Antibiotics) Unknown (comments)    Toradol [Ketorolac Tromethamine] Unknown (comments)      Current Facility-Administered Medications   Medication Dose Route Frequency    fentaNYL (DURAGESIC) 75 mcg/hr patch 1 Patch  1 Patch TransDERmal Q72H    prochlorperazine (COMPAZINE) with saline injection 5 mg  5 mg IntraVENous Q12H    fat emulsion 20% (LIPOSYN, INTRAlipid) infusion 500 mL  500 mL IntraVENous Q MON, WED & FRI    clonazePAM (KlonoPIN) tablet 0.5 mg  0.5 mg Oral BID    vancomycin (VANCOCIN) 1750 mg in  ml infusion  1,750 mg IntraVENous Q12H    methylPREDNISolone (PF) (SOLU-MEDROL) injection 20 mg  20 mg IntraVENous Q6H    sodium chloride (NS) flush 5-10 mL  5-10 mL IntraVENous Q8H    sodium chloride (NS) flush 5-10 mL  5-10 mL IntraVENous PRN    HYDROmorphone (PF) 15 mg/30 ml (DILAUDID) PCA   IntraVENous TITRATE    sodium chloride (NS) flush 5-10 mL  5-10 mL IntraVENous PRN    piperacillin-tazobactam (ZOSYN) 3.375 g in 0.9% sodium chloride (MBP/ADV) 100 mL  3.375 g IntraVENous Q8H    Vancomycin- pharmacy to dose   Other Rx Dosing/Monitoring    famotidine (PF) (PEPCID) 20 mg in sodium chloride 0.9 % 10 mL injection  20 mg IntraVENous Q12H    ondansetron (ZOFRAN) injection 4 mg  4 mg IntraVENous Q6H    HYDROmorphone (PF) (DILAUDID) injection 1 mg  1 mg IntraVENous Q3H PRN    sodium chloride (NS) flush 5-10 mL  5-10 mL IntraVENous PRN          LAB AND IMAGING FINDINGS:     Lab Results   Component Value Date/Time    WBC 20.9 10/18/2017 05:21 AM    HGB 9.8 10/18/2017 05:21 AM    PLATELET 543 16/54/5346 05:21 AM     Lab Results   Component Value Date/Time    Sodium 136 10/18/2017 05:21 AM    Potassium 4.7 10/18/2017 05:21 AM    Chloride 110 10/18/2017 05:21 AM    CO2 17 10/18/2017 05:21 AM    BUN 32 10/18/2017 05:21 AM    Creatinine 1.00 10/18/2017 05:21 AM    Calcium 10.4 10/18/2017 05:21 AM    Magnesium 1.9 10/18/2017 05:21 AM    Phosphorus 3.8 10/18/2017 05:21 AM      Lab Results   Component Value Date/Time    AST (SGOT) 23 10/18/2017 05:21 AM    Alk. phosphatase 199 10/18/2017 05:21 AM    Protein, total 7.3 10/18/2017 05:21 AM    Albumin 2.4 10/18/2017 05:21 AM    Globulin 4.9 10/18/2017 05:21 AM     No results found for: INR, PTMR, PTP, PT1, PT2, APTT   No results found for: IRON, FE, TIBC, IBCT, PSAT, FERR   No results found for: PH, PCO2, PO2  No components found for: GLPOC   No results found for: CPK, CKMB             Total time:   Counseling / coordination time, spent as noted above:   > 50% counseling / coordination?:     Prolonged service was provided for  []30 min   []75 min in face to face time in the presence of the patient, spent as noted above. Time Start:   Time End:   Note: this can only be billed with 87186 (initial) or 32410 (follow up). If multiple start / stop times, list each separately.

## 2017-10-18 NOTE — WOUND CARE
WOCN Note:   reconsult placed by RN for abdominal wound/fistula management;      Chart review revealed:   Admitted for abd pain; history of crohns, DVT, ventral hernia repair, perforated bowel, small bowel obstruction, enterocutaneous fistula; Surgery following [facility]  Ad[facility] from [facility]     Assessment:   Patient is alert, verbal participating in fistula management during pouch change;    Bed: total care bariatric;  Patient has a woodruff catheter;   Diet: NPO  Patient managing pain with PCA;     1. POA midline abdominal wound with enterocutaneous fistula approximately 6cm x 4cm x 1cm with 3 active pseudostomas draining dark liquid drainage; patient refused red rubber catheter in wound base threaded through pouch in to prevent pooling;   2. POA left abdominal ostomy appliance changed; stoma red, moist, with small amount of tan mucous;      Patient positioned supine at completion of treatment and head of bed elevated for patient to eat lunch;       Skin/wound treatment Recommendations:    Weekly and as needed remove eakins pouch; clean wound and periwound skin with normal saline, apply marathon to periwound skin; cut to fit eakins pouch to fit around wound; place stomahesive paste in crease at 3, 6, and 9 O'clock; apply paste around inside edge of open area on eakins pouch; apply around wound and secure with transparent dressing then seal with all care skin prep;      Skin Care & Pressure Injury Prevention Recommendations:  1. Minimize layers of linen/pads under patient to optimize support surface. 2.  Reposition patient approximately every 2 hours;  3. Float heels with pillow under calves. 4.  Continue on total care sport bed;    5. Assess/protect skin in contact with medical devices. Keep skin moisturized;   6.   Ensure that patient is repositioning in chair shifting weight approximately every 15 minutes and standing up every hour.      Discussed above assessment and recommendations with Sirisha(RN);     Transition of Care: Plan to follow weekly and as needed while admitted to hospital.           YOLANDA LazaroN, RN, [facility]  Certified Wound, Ostomy, Continence Nurse  office [phone]  pager Dominick Bai [facility] [place]

## 2017-10-18 NOTE — PROGRESS NOTES
Spiritual Care Assessment/Progress Notes    Charbel Greene [id number]  [id number]    1975  46 y.o.  female    Patient Telephone Number: [phone] (home)   Zoroastrianism Affiliation: Non Mormonism   Language: English   Extended Emergency Contact Information  Primary Emergency Contact: Jacquie Carr  Address: [address]  [address] Phone: [phone]  Work Phone: [phone]  Mobile Phone: [phone]  Relation: Parent   Patient Active Problem List    Diagnosis Date Noted    Acute hyponatremia 10/16/2017    Leukocytosis 10/16/2017    Wound, open, anterior abdominal wall 10/16/2017    SIRS (systemic inflammatory response syndrome) ([address] 10/16/2017    DEAN (acute kidney injury) ([address] 10/16/2017    Enterocutaneous fistula 06/30/2017    Small bowel ischemia ([address] 06/28/2017    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    Abdominal pain 06/25/2017    Perforated bowel ([address] 06/06/2017    Right flank pain 08/22/2011    Crohn's disease ([address] 08/15/2011    DVT (deep venous thrombosis) ([facility] 08/15/2011    Incarcerated ventral hernia 08/15/2011        Date: 10/18/2017       Level of Zoroastrianism/Spiritual Activity:  []         Involved in jalen tradition/spiritual practice    []         Not involved in jalen tradition/spiritual practice  []         Spiritually oriented    []         Claims no spiritual orientation    []         seeking spiritual identity  []         Feels alienated from Confucianist practice/tradition  []         Feels angry about Confucianist practice/tradition  []         Spirituality/Confucianist tradition a resource for coping at this time.   [x]         Not able to assess     Services Provided Today:  []         crisis intervention    []         reading Scriptures  []         spiritual assessment    []         prayer  []         empathic listening/emotional support  []         rites and rituals (cite in comments)  [] life review     []         Presybeterian support  []         theological development   []         advocacy  []         ethical dialog     []         blessing  []         bereavement support    []         support to family  []         anticipatory grief support   []         help with AMD  []         spiritual guidance    []         meditation      Spiritual Care Needs  []         Emotional Support  []         Spiritual/Church Care  []         Loss/Adjustment  []         Advocacy/Referral                /Ethics  []         No needs expressed at               this time  []         Other: (note in               comments)  [address]  []         Follow up visits with               pt/family  []         Provide materials  []         Schedule sacraments  []         Contact Community               Clergy  [x]         Follow up as needed  []         Other: (note in               comments)     Comments: Several attempts made to visit pt. This afternoon, pt appeared to be resting; did not disturb. Pt is known to this  from previous hospitalization. Will attempt to follow-up as able; please page [phone] for  support as needed.     Bhumika Vivar, Palliative

## 2017-10-18 NOTE — PROGRESS NOTES
[address]  [address], [address]       GI PROGRESS NOTE  Will Sindy Menendez  [phone] office  [phone] NP/PA in-hospital cell phone M-F until 4:30PM  After 5PM or on weekends, please call  for physician on call      NAME: Frank Almaraz   :  1975   MRN:  [id number]       Subjective:   Patient has some complaints of abdominal pain which is improved with the pain medication. No nausea/vomiting. She tolerated clear liquids last evening and this morning. Objective:     VITALS:   Last 24hrs VS reviewed since prior progress note. Most recent are:  Visit Vitals    /75 (BP 1 Location: Left arm, BP Patient Position: At rest)    Pulse 98    Temp 98.1 °F (36.7 °C)    Resp 22    Ht 5' 4\" (1.626 m)    Wt 143.9 kg (317 lb 3.9 oz)    SpO2 99%    BMI 54.45 kg/m2       PHYSICAL EXAM:  General: Cooperative, no acute distress    Neurologic:  Alert and oriented X 3. HEENT: EOMI, no scleral icterus   Lungs:  CTA bilaterally  Heart:  S1 S2, regular rhythm, no murmur   Abdomen: Obese, non-distended, diffuse tenderness to palpation. Fistula with new dressing being applied by nurse at the bedside. LLQ ostomy with minimal output. Extremities: No edema  Psych:   Good insight. Not anxious or agitated.     Lab Data Reviewed:     Recent Results (from the past 24 hour(s))   CBC W/O DIFF    Collection Time: 10/18/17  3:07 AM   Result Value Ref Range    WBC PLEASE DISREGARD RESULTS 3.6 - 11.0 K/uL    RBC PLEASE DISREGARD RESULTS 3.80 - 5.20 M/uL    HGB PLEASE DISREGARD RESULTS 11.5 - 16.0 g/dL    HCT PLEASE DISREGARD RESULTS 35.0 - 47.0 %    MCV Cannot be calculated 80.0 - 99.0 FL    MCH Cannot be calculated 26.0 - 34.0 PG    MCHC Cannot be calculated 30.0 - 36.5 g/dL    RDW PLEASE DISREGARD RESULTS 11.5 - 14.5 %    PLATELET PLEASE DISREGARD RESULTS 150 - 400 K/uL   CBC WITH AUTOMATED DIFF    Collection Time: 10/18/17  5:21 AM   Result Value Ref Range    WBC 20.9 (H) 3.6 - 11.0 K/uL    RBC 3.54 (L) 3.80 - 5.20 M/uL    HGB 9.8 (L) 11.5 - 16.0 g/dL    HCT 30.3 (L) 35.0 - 47.0 %    MCV 85.6 80.0 - 99.0 FL    MCH 27.7 26.0 - 34.0 PG    MCHC 32.3 30.0 - 36.5 g/dL    RDW 16.8 (H) 11.5 - 14.5 %    PLATELET 473 906 - 151 K/uL    NEUTROPHILS 87 (H) 32 - 75 %    LYMPHOCYTES 5 (L) 12 - 49 %    MONOCYTES 5 5 - 13 %    EOSINOPHILS 0 0 - 7 %    BASOPHILS 0 0 - 1 %    METAMYELOCYTES 1 (H) 0 %    MYELOCYTES 2 (H) 0 %    ABS. NEUTROPHILS 18.2 (H) 1.8 - 8.0 K/UL    ABS. LYMPHOCYTES 1.0 0.8 - 3.5 K/UL    ABS. MONOCYTES 1.0 0.0 - 1.0 K/UL    ABS. EOSINOPHILS 0.0 0.0 - 0.4 K/UL    ABS. BASOPHILS 0.0 0.0 - 0.1 K/UL    DF MANUAL      RBC COMMENTS ANISOCYTOSIS  1+        RBC COMMENTS POLYCHROMASIA  1+        RBC COMMENTS OVALOCYTES  1+       MAGNESIUM    Collection Time: 10/18/17  5:21 AM   Result Value Ref Range    Magnesium 1.9 1.6 - 2.4 mg/dL   METABOLIC PANEL, COMPREHENSIVE    Collection Time: 10/18/17  5:21 AM   Result Value Ref Range    Sodium 136 136 - 145 mmol/L    Potassium 4.7 3.5 - 5.1 mmol/L    Chloride 110 (H) 97 - 108 mmol/L    CO2 17 (L) 21 - 32 mmol/L    Anion gap 9 5 - 15 mmol/L    Glucose 237 (H) 65 - 100 mg/dL    BUN 32 (H) 6 - 20 MG/DL    Creatinine 1.00 0.55 - 1.02 MG/DL    BUN/Creatinine ratio 32 (H) 12 - 20      GFR est AA >60 >60 ml/min/1.73m2    GFR est non-AA >60 >60 ml/min/1.73m2    Calcium 10.4 (H) 8.5 - 10.1 MG/DL    Bilirubin, total 0.3 0.2 - 1.0 MG/DL    ALT (SGPT) 37 12 - 78 U/L    AST (SGOT) 23 15 - 37 U/L    Alk.  phosphatase 199 (H) 45 - 117 U/L    Protein, total 7.3 6.4 - 8.2 g/dL    Albumin 2.4 (L) 3.5 - 5.0 g/dL    Globulin 4.9 (H) 2.0 - 4.0 g/dL    A-G Ratio 0.5 (L) 1.1 - 2.2     PHOSPHORUS    Collection Time: 10/18/17  5:21 AM   Result Value Ref Range    Phosphorus 3.8 2.6 - 4.7 MG/DL   GLUCOSE, POC    Collection Time: 10/18/17 12:09 PM   Result Value Ref Range    Glucose (POC) 119 (H) 65 - 100 mg/dL    Performed by Presley Bai         Assessment:   · Crohn's Disease: multiple abdominal surgeries; EC fistula    · Abdominal Pain: AST, Alk phosphatase, and leukocytosis improving. CRP >9.5. Fecal calprotectin pending. Surgery evaluated with no immediate surgical plans for fistula. Tentative plans to repair fistula in January. 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 Plan to transition to PO steroids. · Clear liquids. If continues to tolerate, will slowly advance diet. Signed By: АНДРЕЙ Arango     10/18/2017  2:54 PM       GI Attending: Agree with above. Will continue IV steroids for now and transition to PO steroids based on clinical course. Please call with questions. Cain Saenz MD

## 2017-10-18 NOTE — PROGRESS NOTES
NUTRITION       Recommendations:  1. Resume maintenance fluid until TPN volume increased to goal to prevent dehydration in setting of high fistula output    2. Consider adding insulin to TPN for optimal BG control (10 units/L to start vs 20 units/L to provide 1 unit per 10 g of CHO). Add correction scale to cover elevated BG.    -- Check BG 2 hours into infusion and 1 hour after infusion is completed    3. Continue daily weights (via standing weight as able and rezero bedscale-- current weight is up 13#)    4. If BG more stable with labs, consider increasing TPN volume to better meet estimated needs  -- Goal of 5%AA D20 @ 85 mL/hr x 1 hour     @ 172 mL/hr x 13 hours     @ 85 mL/hr x 1 hour    + 20% lipids, 500 mL 3x/week    + Add MVI, ascorbic acid 500 mg, zinc sulfate (10 mg/day x 10 days) for wound healing    Brief follow up. Chart reviewed, discussed with RN and Pharm D during interdisciplinary rounds. Also spoke with MD.  Pt on cyclic TPN. BG elevated with morning lab draw (237). No other POC BG checked. She also does not have correction scale ordered. TPN: 5%AA D20 @ 85 ml/hr x 1 hour; 165 mL/hr x 11 hours; 85 mL/hr x last hour  -- Lipids added today (20%, 500 mL 3x/week-- MWF)  -- Today, with lipids, TPN to provide 2747 kcal, 99 g protein and 1985 mL, 397 grams CHO-- meeting 100% and 90% of estimated kcal and protein needs, respectively. -- Above goal to provide a daily average of 2546 kcal, 120 g protein in 2406 mL, 481 grams CHO-- meeting 100% of estimated needs. **13# gain x 3 days-- weigh pt via standing scale or rezero bedscale as able.   Last 3 Recorded Weights in this Encounter    10/16/17 0415 10/17/17 0540 10/18/17 0108   Weight: 138.7 kg (305 lb 12.5 oz) 141.6 kg (312 lb 2.7 oz) 143.9 kg (317 lb 3.9 oz)     Estimated Nutrition Needs:   Kcals/day: 9211 Kcals/day (6567-2410 kcal/day (MSJ x 1.2-1.3))  Protein: 110 g (110-136 g/day (2-2.5 g/kg IBW))  Fluid: 2500 ml (1 mL/kcal (adjust per fistula output))     Based On: Kristen Ozuna  Weight Used:  (Actual wt of 141.6 kg (kcal needs); IBW of 55 kg (pro needs)  )     RD to follow.     [address]

## 2017-10-18 NOTE — PROGRESS NOTES
Primary Nurse Timothy Meyer and Donnie Soulier , RN performed a dual skin assessment on this patient Impairment noted- see wound doc flow sheet  Bruising on abdomen and back of arms from heparin.    Fistula and L illeostomy

## 2017-10-18 NOTE — PROGRESS NOTES
Problem: General Infection Care Plan (Adult and Pediatric)  Goal: *Absence of infection signs and symptoms  Outcome: Progressing Towards Goal  Pt has been afebrile with no other S/S of infection today. Goal: *Optimize nutritional status  Outcome: Not Progressing Towards Goal  Variance: Other (add note)  Comments: Pt is NPO and receiving TPN.

## 2017-10-18 NOTE — PROGRESS NOTES
Subjective  Feeling a little better    Temp:  [97.5 °F (36.4 °C)-99.3 °F (37.4 °C)]   Pulse (Heart Rate):  []   BP: ()/(41-82)   Resp Rate:  [14-23]   O2 Sat (%):  [94 %-100 %]   Weight:  [304 lb 5 oz (138 kg)-317 lb 3.9 oz (143.9 kg)]   10/18 0701 - 10/18 1900  In: 3292.9 [P.O.:1182; I.V.:2110.9]  Out: 1500 [Urine:400; Drains:1100]  10/16 1901 - 10/18 0700  In: 1253.4 [P.O.:820; I.V.:433.4]  Out: 6656 [Urine:2025; Drains:5600]      Objective  Gen- Al;ert in NAD  Abd- Soft still with mild tenderness above the wound. Fistula unchanged  Recent Results (from the past 24 hour(s))   CBC W/O DIFF    Collection Time: 10/18/17  3:07 AM   Result Value Ref Range    WBC PLEASE DISREGARD RESULTS 3.6 - 11.0 K/uL    RBC PLEASE DISREGARD RESULTS 3.80 - 5.20 M/uL    HGB PLEASE DISREGARD RESULTS 11.5 - 16.0 g/dL    HCT PLEASE DISREGARD RESULTS 35.0 - 47.0 %    MCV Cannot be calculated 80.0 - 99.0 FL    MCH Cannot be calculated 26.0 - 34.0 PG    MCHC Cannot be calculated 30.0 - 36.5 g/dL    RDW PLEASE DISREGARD RESULTS 11.5 - 14.5 %    PLATELET PLEASE DISREGARD RESULTS 150 - 400 K/uL   CBC WITH AUTOMATED DIFF    Collection Time: 10/18/17  5:21 AM   Result Value Ref Range    WBC 20.9 (H) 3.6 - 11.0 K/uL    RBC 3.54 (L) 3.80 - 5.20 M/uL    HGB 9.8 (L) 11.5 - 16.0 g/dL    HCT 30.3 (L) 35.0 - 47.0 %    MCV 85.6 80.0 - 99.0 FL    MCH 27.7 26.0 - 34.0 PG    MCHC 32.3 30.0 - 36.5 g/dL    RDW 16.8 (H) 11.5 - 14.5 %    PLATELET 088 191 - 037 K/uL    NEUTROPHILS 87 (H) 32 - 75 %    LYMPHOCYTES 5 (L) 12 - 49 %    MONOCYTES 5 5 - 13 %    EOSINOPHILS 0 0 - 7 %    BASOPHILS 0 0 - 1 %    METAMYELOCYTES 1 (H) 0 %    MYELOCYTES 2 (H) 0 %    ABS. NEUTROPHILS 18.2 (H) 1.8 - 8.0 K/UL    ABS. LYMPHOCYTES 1.0 0.8 - 3.5 K/UL    ABS. MONOCYTES 1.0 0.0 - 1.0 K/UL    ABS. EOSINOPHILS 0.0 0.0 - 0.4 K/UL    ABS.  BASOPHILS 0.0 0.0 - 0.1 K/UL    DF MANUAL      RBC COMMENTS ANISOCYTOSIS  1+        RBC COMMENTS POLYCHROMASIA  1+        RBC COMMENTS OVALOCYTES  1+       MAGNESIUM    Collection Time: 10/18/17  5:21 AM   Result Value Ref Range    Magnesium 1.9 1.6 - 2.4 mg/dL   METABOLIC PANEL, COMPREHENSIVE    Collection Time: 10/18/17  5:21 AM   Result Value Ref Range    Sodium 136 136 - 145 mmol/L    Potassium 4.7 3.5 - 5.1 mmol/L    Chloride 110 (H) 97 - 108 mmol/L    CO2 17 (L) 21 - 32 mmol/L    Anion gap 9 5 - 15 mmol/L    Glucose 237 (H) 65 - 100 mg/dL    BUN 32 (H) 6 - 20 MG/DL    Creatinine 1.00 0.55 - 1.02 MG/DL    BUN/Creatinine ratio 32 (H) 12 - 20      GFR est AA >60 >60 ml/min/1.73m2    GFR est non-AA >60 >60 ml/min/1.73m2    Calcium 10.4 (H) 8.5 - 10.1 MG/DL    Bilirubin, total 0.3 0.2 - 1.0 MG/DL    ALT (SGPT) 37 12 - 78 U/L    AST (SGOT) 23 15 - 37 U/L    Alk. phosphatase 199 (H) 45 - 117 U/L    Protein, total 7.3 6.4 - 8.2 g/dL    Albumin 2.4 (L) 3.5 - 5.0 g/dL    Globulin 4.9 (H) 2.0 - 4.0 g/dL    A-G Ratio 0.5 (L) 1.1 - 2.2     PHOSPHORUS    Collection Time: 10/18/17  5:21 AM   Result Value Ref Range    Phosphorus 3.8 2.6 - 4.7 MG/DL         Principal Problem:    SIRS (systemic inflammatory response syndrome) (HCC) (10/16/2017)    Active Problems:    Abdominal pain (6/25/2017)      Acute hyponatremia (10/16/2017)      Leukocytosis (10/16/2017)      Wound, open, anterior abdominal wall (10/16/2017)      DEAN (acute kidney injury) ([facility] (10/16/2017)          Assessment & Plan  Crohn's exacerbation- just started back on steroids  There are no immediate surgical plans for her fistula. When ok with GI it is alright to put her back on a regular diet like she has been on at [facility]. She will need to continue TPN during this process and will eventually be discharged back there with TPN. There are tentative plans to repair the fistula in January . For now just needs wound care.

## 2017-10-18 NOTE — ROUTINE PROCESS
TRANSFER - OUT REPORT:    Verbal report given to Christopher Jimenez RN(name) on Zita  being transferred to (unit) for routine progression of care       Report consisted of patients Situation, Background, Assessment and   Recommendations(SBAR). Information from the following report(s) SBAR, Kardex, Intake/Output, MAR, Recent Results and Cardiac Rhythm NSR/ST was reviewed with the receiving nurse. Lines:   PICC Double Lumen 08/02/17 Right (Active)   Central Line Being Utilized Yes 10/18/2017  8:04 AM   Criteria for Appropriate Use Long term IV/antibiotic administration 10/18/2017  8:04 AM   Site Assessment Clean, dry, & intact 10/18/2017  8:04 AM   Phlebitis Assessment 0 10/18/2017  8:04 AM   Infiltration Assessment 0 10/18/2017  8:04 AM   Date of Last Dressing Change 10/16/17 10/18/2017  8:04 AM   Dressing Status Clean, dry, & intact 10/18/2017 12:07 AM   Action Taken Open ports on tubing capped 10/18/2017 12:07 AM   External Catheter Length (cm) 1 centimeters 10/17/2017 11:17 AM   Dressing Type Disk with Chlorhexadine gluconate (CHG); Trach dressing; Tape 10/18/2017 12:07 AM   Hub Color/Line Status White; Infusing;Flushed 10/18/2017 12:07 AM   Positive Blood Return (Site #1) Yes 10/18/2017 12:07 AM   Hub Color/Line Status Purple;Flushed 10/18/2017 12:07 AM   Positive Blood Return (Site #2) Yes 10/18/2017 12:07 AM   Alcohol Cap Used Yes 10/18/2017 12:07 AM       Quad Lumen 10/17/17 Right Internal jugular (Active)   Central Line Being Utilized Yes 10/18/2017  8:04 AM   Criteria for Appropriate Use Limited/no vessel suitable for conventional peripheral access 10/18/2017  8:04 AM   Site Assessment Clean, dry, & intact 10/18/2017  8:04 AM   Infiltration Assessment 0 10/18/2017  8:04 AM   Affected Extremity/Extremities Color distal to insertion site pink (or appropriate for race); Pulses palpable 10/18/2017 12:07 AM   Date of Last Dressing Change 10/17/17 10/18/2017  8:04 AM   Dressing Status Clean, dry, & intact 10/18/2017  8:04 AM   Dressing Type Transparent 10/18/2017  8:04 AM   Action Taken Open ports on tubing capped 10/18/2017 12:07 AM   Proximal Hub Color/Line Status Infusing 10/18/2017 12:07 AM   Positive Blood Return (Medial Site) Yes 10/17/2017  8:00 PM   Medial 1 Hub Color/Line Status Flushed; Infusing 10/17/2017  8:00 PM   Positive Blood Return (Lateral Site) Yes 10/17/2017  8:00 PM   Medial 2 Hub Color/Line Status Infusing;Flushed 10/17/2017  8:00 PM   Positive Blood Return (Site #3) Yes 10/17/2017  8:00 PM   Distal Hub Color/Line Status Flushed;Capped 10/17/2017  8:00 PM   Positive Blood Return (Site #4) Yes 10/17/2017  8:00 PM   Alcohol Cap Used Yes 10/17/2017  8:00 PM        Opportunity for questions and clarification was provided.       Patient transported with:   [facility]

## 2017-10-18 NOTE — PROGRESS NOTES
Hospitalist Progress Note                                            Date of Service:  10/18/2017  NAME:  Stacy Roa  :  1975  MRN:  [id number]      Admission Summary:   Per H&P \"The patient is a 62-year-old white   female with past medical history of Crohn disease, DVT, incarcerated   ventral hernia, chronic abdominal pain, morbid obesity, seizures,   stroke and multiple repeat abdominal surgeries, presented to the   emergency department via EMS from  [facility] with chief   complaint of abdominal pain. The patient has chronic abdominal pain. She has been on Dilaudid at [facility]. She notably has been on a PCA   according to her mother's report. In addition, she takes additional   doses of Dilaudid intermittently. Tonight she complains of stabbing,   severe generalized abdominal pain mostly in epigastrium according to   initial reports, rated at 10/10,  aggravated with any motion and   movement, without specific alleviating factors. She complains   of nausea, vomiting, chills, cough productive of clear sputum and per   the ER soreness near her vagina. She has open abdominal wound,   enterocutaneous fistula, which drains into collection bag. The patient   notes there has been some leakage around the fistula. She also has a   ileostomy and her mother notes there has been some \"blood\" seen   within the ostomy bag. The patient's recent history has been   complicated over the last several months. She reportedly had   undergone capsule endoscopy. She was subsequently admitted on   2017 secondary to retained video capsule. She underwent single   balloon enteroscopy on 2017 with iatrogenic bowel perforation   during enteroscopy.  She reportedly notably underwent diagnostic   laparoscopy converted to open exploratory laparotomy with extensive   (7-hour) lysis of adhesions with intraoperative upper endoscopy   performed and physical findings of a sealed perforated jejunum. Postoperatively the patient was noted to have MRSA wound infection   treated with IV vancomycin. She was discharged home with oral   antibiotics. The patient returned to the emergency department on   06/25/2017 with abdominal pain. She went back to OR on 06/28/2017   with this diagnosis of ischemic bowel and underwent exploratory   laparotomy, extensive lysis of adhesions with repair of enterotomy and   SMA stent on the left. Per review of chart records patient developed a   fistula  which required daily wound care, started on TPN. She was   essentially bed bound during the hospitalization and has   required multiple drains, which were eventually removed and replaced   with a wound VAC. She was eventually discharged to [facility]   with morphine PCA pump, TPN as well as additional oral pain   medications with intractable pain. During hospitalization, the patient   was seen by general surgery, vascular surgery, infectious disease,   cardiology, pulmonary  and palliative care services. The patient has   continued to require long-term skilled nursing care. She was   reportedly  recently treated for a UTI. There are no complaints   of syncope, loss of consciousness, new onset numbness,   paresthesias, slurred speech, facial droop, visual disturbance,   headache, neck pain, back pain, chest pain although at present, she   complains of chronic chest wall pain with light palpation and   hypersensitivity of her skin. She has not complained dysuria or   hematuria, increased calf pain, swelling, edema, fever, chills or rash. On arrival in emergency department tonight, initial recorded vital signs   were blood pressure 122/43, heart rate 128, respiratory rate of 20, O2   saturation is 100% on room air.  Workup included CT abdomen and   pelvis without contrast which showed large anterior abdominal wall and   site of previous known enterocutaneous fistula with mild stranding   around anterior abdominal wound as well as abdominal mesentery,   nonspecific wall thickening and inflammatory appearance of the   duodenum with nonspecific wall thickening with inflammatory   appearance of the duodenum. Labs showed elevated WBC 23,500. The patient had elevated BUN 94, creatinine of 2.26 and GFR of 24. Per the ED the patient was given Dilaudid 2 mg IV x2 doses, Zofran 8   mg IV x1 dose, Zofran 3.375 grams IV, levofloxacin 750 mg IV,   vancomycin 2000 mg IV. Blood cultures have been sent. The patient is   now seen for admission to the hospitalist service for continued   evaluation and treatments. The patient has already been seen by   general surgeon in consultation\". Interval history / Subjective:   She states her abdominal pain is slightly improved, but now complains of a headache mainly behind her right eye. She denies vision changes, weakness or paresthesias. Intermittent nausea, no zain     Assessment & Plan:   Acute on chronic abdominal pain: (POA) the etiology is unclear. This may be a Crohn's flare  -CT abdomen and pelvis showed arge anterior abdominal wound, and a site of previous/known enterocutaneous fistula though this is difficult to assess given lack of oral and IV contrastmaterial. Mild stranding around the anterior abdominal wound as well as in the abdominal mesentery. Nonspecific wall thickening/inflammatory appearance of the duodenum.  -Not clear what caused this acute worsening of the pain. Chron's flare possible.  -palliative care consulted, recs appreciated. 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 Wound care team following.     Systemic inflammatory response syndrome (POA)   -likely due to Crohn's, Intra-abdominal infection possible  -stopped levofloxacin 10/17  -continue vancomycin and Zosyn for now    Hyponatremia Improved with fluids Hypokalemia, hypomagnesemia, hypophosphatemia: Resolved with adjusting TPN; monitor    Hypercalcemia: mild, likely due to TPN; will adjust TPN composition and monitor. Will also add insulin to TPN bag for hyperglycemia    DEAN pre-renal, resolved with IV fluids    Chronic pain syndrome: palliative care management appreciated - increased fentanyl patch today     Morbid obesity    Diet: TPN  Code status: full  DVT prophylaxis: [address] discussed with: patient, RN    Hospital Problems  Date Reviewed: 10/16/2017          Codes Class Noted POA    Acute hyponatremia ICD-10-CM: E87.1  ICD-9-CM: 276.1  10/16/2017 Unknown        Leukocytosis ICD-10-CM: D72.829  ICD-9-CM: 288.60  10/16/2017 Unknown        Wound, open, anterior abdominal wall ICD-10-CM: S31.109A  ICD-9-CM: 879.2  10/16/2017 Unknown        * (Principal)SIRS (systemic inflammatory response syndrome) ([facility]) ICD-10-CM: R65.10  ICD-9-CM: 995.90  10/16/2017 Unknown        DEAN (acute kidney injury) ([facility] ICD-10-CM: N17.9  ICD-9-CM: 584.9  10/16/2017 Unknown        Abdominal pain ICD-10-CM: R10.9  ICD-9-CM: 789.00  6/25/2017 Unknown                Review of Systems:   Pertinent items are noted in HPI. Physical Examination:      Last 24hrs VS reviewed since prior progress note.  Most recent are:  Visit Vitals    /70 (BP 1 Location: Left arm, BP Patient Position: At rest)    Pulse 100    Temp 98.2 °F (36.8 °C)    Resp 20    Ht 5' 4\" (1.626 m)    Wt 143.9 kg (317 lb 3.9 oz)    SpO2 100%    BMI 54.45 kg/m2           Constitutional:  Drowsy, NAD   ENT:  anicteric sclerae, pale conjunctiva, MMM       Resp:  CTA b/l, normal work of breathing   CV:  RRR, no MRG, no JVD, no peripheral edema    GI:  wounds dressed, bag over fistula draining yellow fluid, abd is obese otherwise   :          Musculoskeletal:  No edema, warm,    Neurologic:  EOMI, CN grossly intact, normal speech, moves all extremities              Intake/Output Summary (Last 24 hours) at 10/18/17 1108  Last data filed at 10/18/17 1013   Gross per 24 hour   Intake          3364.28 ml   Output             6275 ml   Net         -2910.72 ml          Data Review:    Review and/or order of clinical lab test  Review and/or order of tests in the radiology section of CPT  Review and/or order of tests in the medicine section of CPT      Labs:     Recent Labs      10/18/17   0521  10/18/17   0307   WBC  20.9*  PLEASE DISREGARD RESULTS   HGB  9.8*  PLEASE DISREGARD RESULTS   HCT  30.3*  PLEASE DISREGARD RESULTS   PLT  [facility]      10/18/17   0521  10/17/17   0604  10/16/17   1134  10/16/17   0518   NA  136  141  135*  130*   K  4.7  2.7*  3.3*  2.7*   CL  110*  114*  104  98   CO2  17*  20*  21  21   BUN  32*  32*  64*  79*   CREA  1.00  0.94  1.49*  1.88*   GLU  237*  131*  124*  106*   CA  10.4*  8.5  10.0  10.5*   MG  1.9  1.2*   --   1.6   PHOS  3.8  1.3*   --    --      Recent Labs      10/18/17   0521  10/17/17   0604  10/16/17   0518   SGOT  23  25  46*   ALT  37  34  56   AP  199*  181*  297*   TBILI  0.3  0.8  0.8   TP  7.3  6.1*  8.1   ALB  2.4*  1.9*  2.7*   GLOB  4.9*  4.2*  5.4*     No results for input(s): INR, PTP, APTT in the last 72 hours. No lab exists for component: INREXT, INREXT   No results for input(s): FE, TIBC, PSAT, FERR in the last 72 hours. No results found for: FOL, RBCF   No results for input(s): PH, PCO2, PO2 in the last 72 hours. No results for input(s): CPK, CKNDX, TROIQ in the last 72 hours.     No lab exists for component: CPKMB  No results found for: CHOL, CHOLX, CHLST, CHOLV, HDL, LDL, LDLC, DLDLP, TGLX, TRIGL, TRIGP, CHHD, CHHDX  Lab Results   Component Value Date/Time    Glucose (POC) 117 08/24/2017 12:11 PM    Glucose (POC) 105 08/23/2017 11:04 PM    Glucose (POC) 112 08/23/2017 11:28 AM    Glucose (POC) 116 08/22/2017 09:12 PM    Glucose (POC) 135 08/22/2017 11:37 AM     Lab Results   Component Value Date/Time    Color YELLOW/STRAW 10/15/2017 11:02 PM    Appearance CLOUDY 10/15/2017 11:02 PM    Specific gravity 1.019 10/15/2017 11:02 PM    Specific gravity 1.020 07/07/2009 07:20 PM    pH (UA) 5.5 10/15/2017 11:02 PM    Protein TRACE 10/15/2017 11:02 PM    Glucose NEGATIVE  10/15/2017 11:02 PM    Ketone NEGATIVE  10/15/2017 11:02 PM    Bilirubin NEGATIVE  10/15/2017 11:02 PM    Urobilinogen 0.2 10/15/2017 11:02 PM    Nitrites NEGATIVE  10/15/2017 11:02 PM    Leukocyte Esterase MODERATE 10/15/2017 11:02 PM    Epithelial cells FEW 10/15/2017 11:02 PM    Bacteria NEGATIVE  10/15/2017 11:02 PM    WBC 5-10 10/15/2017 11:02 PM    RBC 5-10 10/15/2017 11:02 PM         Medications Reviewed:     Current Facility-Administered Medications   Medication Dose Route Frequency    fentaNYL (DURAGESIC) 100 mcg/hr patch 1 Patch  1 Patch TransDERmal Q72H    morphine (ROXANOL) 100 mg/5 mL (20 mg/mL) concentrated solution 20 mg  20 mg Oral Q3H PRN    0.9% sodium chloride infusion  75 mL/hr IntraVENous CONTINUOUS    insulin lispro (HUMALOG) injection   SubCUTAneous AC&HS    glucose chewable tablet 16 g  4 Tab Oral PRN    dextrose (D50W) injection syrg 12.5-25 g  12.5-25 g IntraVENous PRN    glucagon (GLUCAGEN) injection 1 mg  1 mg IntraMUSCular PRN    prochlorperazine (COMPAZINE) with saline injection 5 mg  5 mg IntraVENous Q12H    fat emulsion 20% (LIPOSYN, INTRAlipid) infusion 500 mL  500 mL IntraVENous Q MON, WED & FRI    clonazePAM (KlonoPIN) tablet 0.5 mg  0.5 mg Oral BID    vancomycin (VANCOCIN) 1750 mg in  ml infusion  1,750 mg IntraVENous Q12H    methylPREDNISolone (PF) (SOLU-MEDROL) injection 20 mg  20 mg IntraVENous Q6H    sodium chloride (NS) flush 5-10 mL  5-10 mL IntraVENous Q8H    sodium chloride (NS) flush 5-10 mL  5-10 mL IntraVENous PRN    HYDROmorphone (PF) 15 mg/30 ml (DILAUDID) PCA   IntraVENous TITRATE    sodium chloride (NS) flush 5-10 mL  5-10 mL IntraVENous PRN    piperacillin-tazobactam (ZOSYN) 3.375 g in 0.9% sodium chloride (MBP/ADV) 100 mL  3.375 g IntraVENous Q8H    Vancomycin- pharmacy to dose   Other Rx Dosing/Monitoring    famotidine (PF) (PEPCID) 20 mg in sodium chloride 0.9 % 10 mL injection  20 mg IntraVENous Q12H    ondansetron (ZOFRAN) injection 4 mg  4 mg IntraVENous Q6H    HYDROmorphone (PF) (DILAUDID) injection 1 mg  1 mg IntraVENous Q3H PRN    sodium chloride (NS) flush 5-10 mL  5-10 mL IntraVENous PRN     ______________________________________________________________________  EXPECTED LENGTH OF STAY: 3d 7h  ACTUAL LENGTH OF STAY:          2                 Shira Lei MD

## 2017-10-19 LAB
ANION GAP SERPL CALC-SCNC: 11 MMOL/L (ref 5–15)
BUN SERPL-MCNC: 33 MG/DL (ref 6–20)
BUN/CREAT SERPL: 32 (ref 12–20)
CALCIUM SERPL-MCNC: 9.2 MG/DL (ref 8.5–10.1)
CHLORIDE SERPL-SCNC: 106 MMOL/L (ref 97–108)
CO2 SERPL-SCNC: 16 MMOL/L (ref 21–32)
CREAT SERPL-MCNC: 1.04 MG/DL (ref 0.55–1.02)
GLUCOSE BLD STRIP.AUTO-MCNC: 135 MG/DL (ref 65–100)
GLUCOSE BLD STRIP.AUTO-MCNC: 166 MG/DL (ref 65–100)
GLUCOSE BLD STRIP.AUTO-MCNC: 220 MG/DL (ref 65–100)
GLUCOSE BLD STRIP.AUTO-MCNC: 269 MG/DL (ref 65–100)
GLUCOSE SERPL-MCNC: 452 MG/DL (ref 65–100)
MAGNESIUM SERPL-MCNC: 1.8 MG/DL (ref 1.6–2.4)
PHOSPHATE SERPL-MCNC: 4.5 MG/DL (ref 2.6–4.7)
POTASSIUM SERPL-SCNC: 4.9 MMOL/L (ref 3.5–5.1)
SERVICE CMNT-IMP: ABNORMAL
SODIUM SERPL-SCNC: 133 MMOL/L (ref 136–145)

## 2017-10-19 PROCEDURE — 74011250636 HC RX REV CODE- 250/636: Performed by: PHYSICIAN ASSISTANT

## 2017-10-19 PROCEDURE — 74011000250 HC RX REV CODE- 250: Performed by: HOSPITALIST

## 2017-10-19 PROCEDURE — 80048 BASIC METABOLIC PNL TOTAL CA: CPT | Performed by: HOSPITALIST

## 2017-10-19 PROCEDURE — 65270000029 HC RM PRIVATE

## 2017-10-19 PROCEDURE — 74011000258 HC RX REV CODE- 258: Performed by: HOSPITALIST

## 2017-10-19 PROCEDURE — 74011000250 HC RX REV CODE- 250: Performed by: INTERNAL MEDICINE

## 2017-10-19 PROCEDURE — 74011000250 HC RX REV CODE- 250: Performed by: NURSE PRACTITIONER

## 2017-10-19 PROCEDURE — 74011250636 HC RX REV CODE- 250/636: Performed by: INTERNAL MEDICINE

## 2017-10-19 PROCEDURE — 74011250637 HC RX REV CODE- 250/637: Performed by: HOSPITALIST

## 2017-10-19 PROCEDURE — 36415 COLL VENOUS BLD VENIPUNCTURE: CPT | Performed by: HOSPITALIST

## 2017-10-19 PROCEDURE — 74011636637 HC RX REV CODE- 636/637: Performed by: HOSPITALIST

## 2017-10-19 PROCEDURE — 74011250636 HC RX REV CODE- 250/636: Performed by: FAMILY MEDICINE

## 2017-10-19 PROCEDURE — 82962 GLUCOSE BLOOD TEST: CPT

## 2017-10-19 PROCEDURE — 74011250636 HC RX REV CODE- 250/636: Performed by: HOSPITALIST

## 2017-10-19 PROCEDURE — 74011000258 HC RX REV CODE- 258: Performed by: FAMILY MEDICINE

## 2017-10-19 PROCEDURE — 84100 ASSAY OF PHOSPHORUS: CPT | Performed by: HOSPITALIST

## 2017-10-19 PROCEDURE — 74011250637 HC RX REV CODE- 250/637: Performed by: PHYSICAL MEDICINE & REHABILITATION

## 2017-10-19 PROCEDURE — 83735 ASSAY OF MAGNESIUM: CPT | Performed by: HOSPITALIST

## 2017-10-19 RX ORDER — MORPHINE SULFATE 60 MG/1
60 TABLET, FILM COATED, EXTENDED RELEASE ORAL 2 TIMES DAILY
Status: DISCONTINUED | OUTPATIENT
Start: 2017-10-19 | End: 2017-10-24

## 2017-10-19 RX ORDER — ENOXAPARIN SODIUM 100 MG/ML
40 INJECTION SUBCUTANEOUS EVERY 24 HOURS
Status: DISCONTINUED | OUTPATIENT
Start: 2017-10-19 | End: 2017-10-27 | Stop reason: HOSPADM

## 2017-10-19 RX ORDER — MORPHINE SULFATE 100 MG/1
100 TABLET, FILM COATED, EXTENDED RELEASE ORAL
Status: DISCONTINUED | OUTPATIENT
Start: 2017-10-19 | End: 2017-10-24

## 2017-10-19 RX ORDER — ACETAMINOPHEN 325 MG/1
650 TABLET ORAL
Status: DISCONTINUED | OUTPATIENT
Start: 2017-10-19 | End: 2017-10-27 | Stop reason: HOSPADM

## 2017-10-19 RX ORDER — MORPHINE SULFATE 15 MG/1
30 TABLET ORAL
Status: DISCONTINUED | OUTPATIENT
Start: 2017-10-19 | End: 2017-10-25

## 2017-10-19 RX ADMIN — Medication 10 ML: at 14:00

## 2017-10-19 RX ADMIN — Medication 10 ML: at 00:50

## 2017-10-19 RX ADMIN — HYDROMORPHONE HYDROCHLORIDE 1 MG: 1 INJECTION, SOLUTION INTRAMUSCULAR; INTRAVENOUS; SUBCUTANEOUS at 10:26

## 2017-10-19 RX ADMIN — Medication 10 ML: at 21:08

## 2017-10-19 RX ADMIN — SODIUM CHLORIDE 5 MG: 9 INJECTION INTRAMUSCULAR; INTRAVENOUS; SUBCUTANEOUS at 17:56

## 2017-10-19 RX ADMIN — MORPHINE SULFATE 20 MG: 20 SOLUTION ORAL at 09:49

## 2017-10-19 RX ADMIN — MORPHINE SULFATE 60 MG: 60 TABLET, FILM COATED, EXTENDED RELEASE ORAL at 12:31

## 2017-10-19 RX ADMIN — Medication: at 03:33

## 2017-10-19 RX ADMIN — ENOXAPARIN SODIUM 40 MG: 40 INJECTION SUBCUTANEOUS at 12:32

## 2017-10-19 RX ADMIN — HYDROMORPHONE HYDROCHLORIDE 1 MG: 1 INJECTION, SOLUTION INTRAMUSCULAR; INTRAVENOUS; SUBCUTANEOUS at 17:51

## 2017-10-19 RX ADMIN — MORPHINE SULFATE 30 MG: 15 TABLET ORAL at 17:08

## 2017-10-19 RX ADMIN — ONDANSETRON 4 MG: 2 INJECTION INTRAMUSCULAR; INTRAVENOUS at 17:56

## 2017-10-19 RX ADMIN — INSULIN LISPRO 2 UNITS: 100 INJECTION, SOLUTION INTRAVENOUS; SUBCUTANEOUS at 09:49

## 2017-10-19 RX ADMIN — HYDROMORPHONE HYDROCHLORIDE 1 MG: 1 INJECTION, SOLUTION INTRAMUSCULAR; INTRAVENOUS; SUBCUTANEOUS at 21:08

## 2017-10-19 RX ADMIN — SODIUM CHLORIDE 75 ML/HR: 900 INJECTION, SOLUTION INTRAVENOUS at 21:13

## 2017-10-19 RX ADMIN — METHYLPREDNISOLONE SODIUM SUCCINATE 20 MG: 40 INJECTION, POWDER, FOR SOLUTION INTRAMUSCULAR; INTRAVENOUS at 23:48

## 2017-10-19 RX ADMIN — CLONAZEPAM 0.5 MG: 0.5 TABLET ORAL at 21:08

## 2017-10-19 RX ADMIN — PIPERACILLIN SODIUM,TAZOBACTAM SODIUM 3.38 G: 3; .375 INJECTION, POWDER, FOR SOLUTION INTRAVENOUS at 22:22

## 2017-10-19 RX ADMIN — HYDROMORPHONE HYDROCHLORIDE 1 MG: 1 INJECTION, SOLUTION INTRAMUSCULAR; INTRAVENOUS; SUBCUTANEOUS at 03:11

## 2017-10-19 RX ADMIN — ONDANSETRON 4 MG: 2 INJECTION INTRAMUSCULAR; INTRAVENOUS at 00:48

## 2017-10-19 RX ADMIN — METHYLPREDNISOLONE SODIUM SUCCINATE 20 MG: 40 INJECTION, POWDER, FOR SOLUTION INTRAMUSCULAR; INTRAVENOUS at 00:45

## 2017-10-19 RX ADMIN — RETINOL, ERGOCALCIFEROL, .ALPHA.-TOCOPHEROL ACETATE, DL-, PHYTONADIONE, ASCORBIC ACID, NIACINAMIDE, RIBOFLAVIN 5-PHOSPHATE SODIUM, THIAMINE HYDROCHLORIDE, PYRIDOXINE HYDROCHLORIDE, DEXPANTHENOL, BIOTIN, FOLIC ACID, AND CYANOCOBALAMIN: KIT at 20:22

## 2017-10-19 RX ADMIN — INSULIN LISPRO 3 UNITS: 100 INJECTION, SOLUTION INTRAVENOUS; SUBCUTANEOUS at 22:22

## 2017-10-19 RX ADMIN — METHYLPREDNISOLONE SODIUM SUCCINATE 20 MG: 40 INJECTION, POWDER, FOR SOLUTION INTRAMUSCULAR; INTRAVENOUS at 12:31

## 2017-10-19 RX ADMIN — FAMOTIDINE 20 MG: 10 INJECTION, SOLUTION INTRAVENOUS at 21:08

## 2017-10-19 RX ADMIN — MORPHINE SULFATE 60 MG: 60 TABLET, FILM COATED, EXTENDED RELEASE ORAL at 17:55

## 2017-10-19 RX ADMIN — HYDROMORPHONE HYDROCHLORIDE 1 MG: 1 INJECTION, SOLUTION INTRAMUSCULAR; INTRAVENOUS; SUBCUTANEOUS at 07:25

## 2017-10-19 RX ADMIN — ACETAMINOPHEN 650 MG: 325 TABLET ORAL at 14:47

## 2017-10-19 RX ADMIN — HYDROMORPHONE HYDROCHLORIDE 1 MG: 1 INJECTION, SOLUTION INTRAMUSCULAR; INTRAVENOUS; SUBCUTANEOUS at 14:10

## 2017-10-19 RX ADMIN — FAMOTIDINE 20 MG: 10 INJECTION, SOLUTION INTRAVENOUS at 09:37

## 2017-10-19 RX ADMIN — MORPHINE SULFATE 30 MG: 15 TABLET ORAL at 20:27

## 2017-10-19 RX ADMIN — METHYLPREDNISOLONE SODIUM SUCCINATE 20 MG: 40 INJECTION, POWDER, FOR SOLUTION INTRAMUSCULAR; INTRAVENOUS at 05:20

## 2017-10-19 RX ADMIN — CLONAZEPAM 0.5 MG: 0.5 TABLET ORAL at 09:41

## 2017-10-19 RX ADMIN — MORPHINE SULFATE 20 MG: 20 SOLUTION ORAL at 06:48

## 2017-10-19 RX ADMIN — ONDANSETRON 4 MG: 2 INJECTION INTRAMUSCULAR; INTRAVENOUS at 23:48

## 2017-10-19 RX ADMIN — ONDANSETRON 4 MG: 2 INJECTION INTRAMUSCULAR; INTRAVENOUS at 05:20

## 2017-10-19 RX ADMIN — PIPERACILLIN SODIUM,TAZOBACTAM SODIUM 3.38 G: 3; .375 INJECTION, POWDER, FOR SOLUTION INTRAVENOUS at 14:10

## 2017-10-19 RX ADMIN — SODIUM CHLORIDE 75 ML/HR: 900 INJECTION, SOLUTION INTRAVENOUS at 07:38

## 2017-10-19 RX ADMIN — INSULIN LISPRO 5 UNITS: 100 INJECTION, SOLUTION INTRAVENOUS; SUBCUTANEOUS at 04:29

## 2017-10-19 RX ADMIN — ONDANSETRON 4 MG: 2 INJECTION INTRAMUSCULAR; INTRAVENOUS at 12:31

## 2017-10-19 RX ADMIN — METHYLPREDNISOLONE SODIUM SUCCINATE 20 MG: 40 INJECTION, POWDER, FOR SOLUTION INTRAMUSCULAR; INTRAVENOUS at 17:54

## 2017-10-19 RX ADMIN — PIPERACILLIN SODIUM,TAZOBACTAM SODIUM 3.38 G: 3; .375 INJECTION, POWDER, FOR SOLUTION INTRAVENOUS at 05:20

## 2017-10-19 RX ADMIN — Medication: at 20:16

## 2017-10-19 RX ADMIN — MORPHINE SULFATE 100 MG: 100 TABLET, EXTENDED RELEASE ORAL at 22:23

## 2017-10-19 RX ADMIN — Medication 10 ML: at 05:21

## 2017-10-19 RX ADMIN — SODIUM CHLORIDE 5 MG: 9 INJECTION INTRAMUSCULAR; INTRAVENOUS; SUBCUTANEOUS at 05:20

## 2017-10-19 NOTE — PROGRESS NOTES
Patient's abdomen around incision feels hot to touch, patient requests for Tylenol. Called Attending, Tylenol ordered.

## 2017-10-19 NOTE — PROGRESS NOTES
Music Therapy Assessment    Radha العراقي [id number]  [id number]    1975  46 y.o.  female    Patient Telephone Number: [phone] (home)   Gnosticist Affiliation: Non Evangelical   Language: English   Extended Emergency Contact Information  Primary Emergency Contact: Tian Reilly  Address: [address] Phone: [phone]  Work Phone: [phone]  Mobile Phone: [phone]  Relation: Parent   Patient Active Problem List    Diagnosis Date Noted    Acute hyponatremia 10/16/2017    Leukocytosis 10/16/2017    Wound, open, anterior abdominal wall 10/16/2017    SIRS (systemic inflammatory response syndrome) ([address] 10/16/2017    DEAN (acute kidney injury) ([address] 10/16/2017    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 Communication Status: [  ] Impaired Speech [  ] Nonverbal     Physical Status:   [  ] Oxygen in use  [  ] Hard of Hearing [  ] Vision Impaired  [  ] Ambulatory  [  ] Ambulatory with assistance [  ] Non-ambulatory     Music Preferences, Background:   Classical, Jacqueline Angelina, especially Joanie [address] [facility] and [place]; The patient sang in the school choir, and took guitar and cello lessons. Today, the pt's nurse said she'd observed the pt to enjoy songs from movies, e.g. [employer] from The [address] [place] [address].     Clinical Problem addressed: N/A: Please see Session Observations below.     Goal(s) met in session: N/A: Please see Session Observations below.  Physical/Pain management (Scale of 1-10):    Pre-session rating ___________    Post-session rating __________   [  ] Increased relaxation   [  ] Regulated breathing patterns  [  ] Decreased muscle tension   [  ] Minimized physical distress     Emotional/Psychological:  [  ] Increased self-expression   [  ] Decreased aggressive behavior   [  ] Decreased sadness   [  ] Discussed healthy coping skills     [  ] Improved mood    [  ] Decreased withdrawn behavior     Social:  [  ] Decreased feelings of isolation/loneliness [  ] Positive social interaction   [  ] Provided support and/or comfort for family/friends    Spiritual:  [  ] Spiritual support    [  ] Expressed peace  [  ] Expressed jalen    [  ] Discussed beliefs    Techniques Utilized (Check all that apply): N/A: Please see Session Observations below. [  ] Procedural support MT [  ] Music for relaxation [  ] Patient preferred music  [  ] Suzie analysis  [  ] Song choice  [  ] Music for validation  [  ] Entrainment  [  ] Progressive muscle relax. [  ] Guided visualization  [  ] Leonie Hearn  [  ] Patient instrument playing [  ] Eulogio Bravo writing  [  ] Kemper Schirmer along   [  ] Jeimy Flores  [  ] Sensory stimulation  [  ] Active Listening  [  ] Music for spiritual support [  ] Making of CDs as gifts    Session Observations: This patient (pt) is known to this music therapist (MT) from previous hospitalization. Chaplain Samantha shared with the MT that the pt said she didn't want music therapy at this time during this hospitalization. The pt said there weren't any problems with previous music therapy that caused her to make this choice today. Will follow if requested.     ALLISON Fajardo (Music Therapist-Board Certified)  Spiritual Care Department  Referral-based service

## 2017-10-19 NOTE — PROGRESS NOTES
Hospitalist Progress Note                                            Date of Service:  10/19/2017  NAME:  Darleen Ponce  :  1975  MRN:  [id number]      Admission Summary:   Per H&P \"The patient is a 82-year-old white   female with past medical history of Crohn disease, DVT, incarcerated   ventral hernia, chronic abdominal pain, morbid obesity, seizures,   stroke and multiple repeat abdominal surgeries, presented to the   emergency department via EMS from  [facility] with chief   complaint of abdominal pain. The patient has chronic abdominal pain. She has been on Dilaudid at [facility]. She notably has been on a PCA   according to her mother's report. In addition, she takes additional   doses of Dilaudid intermittently. Tonight she complains of stabbing,   severe generalized abdominal pain mostly in epigastrium according to   initial reports, rated at 10/10,  aggravated with any motion and   movement, without specific alleviating factors. She complains   of nausea, vomiting, chills, cough productive of clear sputum and per   the ER soreness near her vagina. She has open abdominal wound,   enterocutaneous fistula, which drains into collection bag. The patient   notes there has been some leakage around the fistula. She also has a   ileostomy and her mother notes there has been some \"blood\" seen   within the ostomy bag. The patient's recent history has been   complicated over the last several months. She reportedly had   undergone capsule endoscopy. She was subsequently admitted on   2017 secondary to retained video capsule. She underwent single   balloon enteroscopy on 2017 with iatrogenic bowel perforation   during enteroscopy.  She reportedly notably underwent diagnostic   laparoscopy converted to open exploratory laparotomy with extensive   (7-hour) lysis of adhesions with intraoperative upper endoscopy   performed and physical findings of a sealed perforated jejunum. Postoperatively the patient was noted to have MRSA wound infection   treated with IV vancomycin. She was discharged home with oral   antibiotics. The patient returned to the emergency department on   06/25/2017 with abdominal pain. She went back to OR on 06/28/2017   with this diagnosis of ischemic bowel and underwent exploratory   laparotomy, extensive lysis of adhesions with repair of enterotomy and   SMA stent on the left. Per review of chart records patient developed a   fistula  which required daily wound care, started on TPN. She was   essentially bed bound during the hospitalization and has   required multiple drains, which were eventually removed and replaced   with a wound VAC. She was eventually discharged to [facility]   with morphine PCA pump, TPN as well as additional oral pain   medications with intractable pain. During hospitalization, the patient   was seen by general surgery, vascular surgery, infectious disease,   cardiology, pulmonary  and palliative care services. The patient has   continued to require long-term skilled nursing care. She was   reportedly  recently treated for a UTI. There are no complaints   of syncope, loss of consciousness, new onset numbness,   paresthesias, slurred speech, facial droop, visual disturbance,   headache, neck pain, back pain, chest pain although at present, she   complains of chronic chest wall pain with light palpation and   hypersensitivity of her skin. She has not complained dysuria or   hematuria, increased calf pain, swelling, edema, fever, chills or rash. On arrival in emergency department tonight, initial recorded vital signs   were blood pressure 122/43, heart rate 128, respiratory rate of 20, O2   saturation is 100% on room air.  Workup included CT abdomen and   pelvis without contrast which showed large anterior abdominal wall and   site of previous known enterocutaneous fistula with mild stranding   around anterior abdominal wound as well as abdominal mesentery,   nonspecific wall thickening and inflammatory appearance of the   duodenum with nonspecific wall thickening with inflammatory   appearance of the duodenum. Labs showed elevated WBC 23,500. The patient had elevated BUN 94, creatinine of 2.26 and GFR of 24. Per the ED the patient was given Dilaudid 2 mg IV x2 doses, Zofran 8   mg IV x1 dose, Zofran 3.375 grams IV, levofloxacin 750 mg IV,   vancomycin 2000 mg IV. Blood cultures have been sent. The patient is   now seen for admission to the hospitalist service for continued   evaluation and treatments. The patient has already been seen by   general surgeon in consultation\". Interval history / Subjective:   Abdominal pain remains unchanged. Increased fentanyl patch dose and PO liquid morphine are not helping. Tolerating clears. No vomiting, some nausea. No dyspnea. Assessment & Plan:   Acute on chronic abdominal pain: (POA) the etiology is unclear. This may be a Crohn's flare  -CT abdomen and pelvis showed arge anterior abdominal wound, and a site of previous/known enterocutaneous fistula though this is difficult to assess given lack of oral and IV contrastmaterial. Mild stranding around the anterior abdominal wound as well as in the abdominal mesentery. Nonspecific wall thickening/inflammatory appearance of the duodenum.  -Not clear what caused this acute worsening of the pain. Chron's flare possible.  -palliative care consulted, recs appreciated. Currently on Dilaudid PCA (was on this at [facility]), fentanyl patch and PO liquid morphine PRN, IV Dilaudid PRN  -GI following  -gen surg consulted - no plans for surgery this admission, possible surgery in January  -IV steroids started on 10/17    Mid upper abdominal wound with multiple enterococcus fistula  -Local wound care. Wound care team following.     Systemic inflammatory response syndrome (POA)   -likely due to Crohn's, Intra-abdominal infection possible  -stopped levofloxacin 10/17, vancomycin stopped 10/18  -continue Zosyn    Hyponatremia Improved with fluids; monitor     Hypokalemia, hypomagnesemia, hypophosphatemia: Resolved with adjusting TPN; monitor    Hypercalcemia: mild, likely due to TPN; improved after adjusting TPN    DEAN pre-renal, resolved with IV fluids    Chronic pain syndrome: palliative care management appreciated    Morbid obesity    Diet: clears, TPN  Code status: full  DVT prophylaxis: sq Lovenox  Dispo: likely [facility] when abd pain improves    Hospital Problems  Date Reviewed: 10/16/2017          Codes Class Noted POA    Acute hyponatremia ICD-10-CM: E87.1  ICD-9-CM: 276.1  10/16/2017 Unknown        Leukocytosis ICD-10-CM: D72.829  ICD-9-CM: 288.60  10/16/2017 Unknown        Wound, open, anterior abdominal wall ICD-10-CM: S31.109A  ICD-9-CM: 879.2  10/16/2017 Unknown        * (Principal)SIRS (systemic inflammatory response syndrome) ([facility] ICD-10-CM: R65.10  ICD-9-CM: 995.90  10/16/2017 Unknown        DEAN (acute kidney injury) ([facility] ICD-10-CM: N17.9  ICD-9-CM: 584.9  10/16/2017 Unknown        Abdominal pain ICD-10-CM: R10.9  ICD-9-CM: 789.00  6/25/2017 Unknown                Review of Systems:   Pertinent items are noted in HPI. Physical Examination:      Last 24hrs VS reviewed since prior progress note.  Most recent are:  Visit Vitals    BP (!) 143/91 (BP 1 Location: Left arm, BP Patient Position: At rest)    Pulse 88    Temp 98.3 °F (36.8 °C)    Resp 18    Ht 5' 4\" (1.626 m)    Wt 141.1 kg (311 lb 1.1 oz)    SpO2 100%    BMI 53.39 kg/m2           Constitutional:  Drowsy, NAD   ENT:  anicteric sclerae, pale conjunctiva, MMM       Resp:  CTA b/l, normal work of breathing   CV:  RRR, no MRG, no JVD, no peripheral edema    GI:  wounds dressed, bag over fistula draining brown/green fluid, abd is obese otherwise   :          Musculoskeletal:  No edema, warm,    Neurologic:  EOMI, CN grossly intact, normal speech, moves all extremities              Intake/Output Summary (Last 24 hours) at 10/19/17 1101  Last data filed at 10/19/17 1034   Gross per 24 hour   Intake              942 ml   Output             5250 ml   Net            -4308 ml          Data Review:    Review and/or order of clinical lab test  Review and/or order of tests in the radiology section of CPT  Review and/or order of tests in the medicine section of CPT      Labs:     Recent Labs      10/18/17   0521  10/18/17   0307   WBC  20.9*  PLEASE DISREGARD RESULTS   HGB  9.8*  PLEASE DISREGARD RESULTS   HCT  30.3*  PLEASE DISREGARD RESULTS   PLT  294  PLEASE DISREGARD RESULTS     Recent Labs      10/19/17   0038  10/18/17   0521  10/17/17   0604   NA  133*  136  141   K  4.9  4.7  2.7*   CL  106  110*  114*   CO2  16*  17*  20*   BUN  33*  32*  32*   CREA  1.04*  1.00  0.94   GLU  452*  237*  131*   CA  9.2  10.4*  8.5   MG  1.8  1.9  1.2*   PHOS  4.5  3.8  1.3*     Recent Labs      10/18/17   0521  10/17/17   0604   SGOT  23  25   ALT  37  34   AP  199*  181*   TBILI  0.3  0.8   TP  7.3  6.1*   ALB  2.4*  1.9*   GLOB  4.9*  4.2*     No results for input(s): INR, PTP, APTT in the last 72 hours. No lab exists for component: INREXT, INREXT   No results for input(s): FE, TIBC, PSAT, FERR in the last 72 hours. No results found for: FOL, RBCF   No results for input(s): PH, PCO2, PO2 in the last 72 hours. No results for input(s): CPK, CKNDX, TROIQ in the last 72 hours.     No lab exists for component: CPKMB  No results found for: CHOL, CHOLX, CHLST, CHOLV, HDL, LDL, LDLC, DLDLP, TGLX, TRIGL, TRIGP, CHHD, CHHDX  Lab Results   Component Value Date/Time    Glucose (POC) 166 10/19/2017 09:35 AM    Glucose (POC) 269 10/19/2017 04:13 AM    Glucose (POC) 225 10/18/2017 09:24 PM    Glucose (POC) 149 10/18/2017 04:42 PM    Glucose (POC) 127 10/18/2017 03:00 PM     Lab Results   Component Value Date/Time    Color YELLOW/STRAW 10/15/2017 11:02 PM    Appearance CLOUDY 10/15/2017 11:02 PM Specific gravity 1.019 10/15/2017 11:02 PM    Specific gravity 1.020 07/07/2009 07:20 PM    pH (UA) 5.5 10/15/2017 11:02 PM    Protein TRACE 10/15/2017 11:02 PM    Glucose NEGATIVE  10/15/2017 11:02 PM    Ketone NEGATIVE  10/15/2017 11:02 PM    Bilirubin NEGATIVE  10/15/2017 11:02 PM    Urobilinogen 0.2 10/15/2017 11:02 PM    Nitrites NEGATIVE  10/15/2017 11:02 PM    Leukocyte Esterase MODERATE 10/15/2017 11:02 PM    Epithelial cells FEW 10/15/2017 11:02 PM    Bacteria NEGATIVE  10/15/2017 11:02 PM    WBC 5-10 10/15/2017 11:02 PM    RBC 5-10 10/15/2017 11:02 PM         Medications Reviewed:     Current Facility-Administered Medications   Medication Dose Route Frequency    fentaNYL (DURAGESIC) 100 mcg/hr patch 1 Patch  1 Patch TransDERmal Q72H    morphine (ROXANOL) 100 mg/5 mL (20 mg/mL) concentrated solution 20 mg  20 mg Oral Q3H PRN    0.9% sodium chloride infusion  75 mL/hr IntraVENous CONTINUOUS    glucose chewable tablet 16 g  4 Tab Oral PRN    dextrose (D50W) injection syrg 12.5-25 g  12.5-25 g IntraVENous PRN    glucagon (GLUCAGEN) injection 1 mg  1 mg IntraMUSCular PRN    insulin lispro (HUMALOG) injection   SubCUTAneous Q6H    prochlorperazine (COMPAZINE) with saline injection 5 mg  5 mg IntraVENous Q12H    fat emulsion 20% (LIPOSYN, INTRAlipid) infusion 500 mL  500 mL IntraVENous Q MON, WED & FRI    clonazePAM (KlonoPIN) tablet 0.5 mg  0.5 mg Oral BID    methylPREDNISolone (PF) (SOLU-MEDROL) injection 20 mg  20 mg IntraVENous Q6H    sodium chloride (NS) flush 5-10 mL  5-10 mL IntraVENous Q8H    sodium chloride (NS) flush 5-10 mL  5-10 mL IntraVENous PRN    HYDROmorphone (PF) 15 mg/30 ml (DILAUDID) PCA   IntraVENous TITRATE    sodium chloride (NS) flush 5-10 mL  5-10 mL IntraVENous PRN    piperacillin-tazobactam (ZOSYN) 3.375 g in 0.9% sodium chloride (MBP/ADV) 100 mL  3.375 g IntraVENous Q8H    famotidine (PF) (PEPCID) 20 mg in sodium chloride 0.9 % 10 mL injection  20 mg IntraVENous Q12H    ondansetron (ZOFRAN) injection 4 mg  4 mg IntraVENous Q6H    HYDROmorphone (PF) (DILAUDID) injection 1 mg  1 mg IntraVENous Q3H PRN    sodium chloride (NS) flush 5-10 mL  5-10 mL IntraVENous PRN     ______________________________________________________________________  EXPECTED LENGTH OF STAY: 3d 7h  ACTUAL LENGTH OF STAY:          3                 Joel Stuart MD

## 2017-10-19 NOTE — PROGRESS NOTES
Bedside and Verbal shift change report given to Yon Hawthorne (oncoming nurse) by Evan Buck (offgoing nurse). Report included the following information SBAR, Kardex and MAR.

## 2017-10-19 NOTE — PROGRESS NOTES
NUTRITION       Recommendations:  1. Add ascorbic acid 500 mg and zinc sulfate (10 mg/day x 10 days) to TPN for wound healing and GI losses    2. Consider increasing TPN to goal to meet 100% of estimated needs if pt considered stable  Goal: 5%AA D20 @ 85 mL/hr x 1 hour    @ 172 mL/hr x 13 hours    @ 85 mL/hr x 1 hour   + 20% lipids, 500 mL 3x/week   + MVI, ascorbic acid 500 mg, zinc sulfate (10 mg/day x 10 days)  -- This will provide a daily average of 2546 kcal, 120 g protein, 481 gm CHO and 2406 mL  -- Adjust additional IVF per additional fistula output    3. May need to increase insulin in TPN while on steroids (if above goal written and 1 unit per 10 gm CHO desired, would consider 20 units/L-- 48 units/cycle). Brief follow up. Chart reviewed. Pt remains on TPN as sole source of nutrition support. BG have been elevated (pt also on steroids). Current TPN:   5%AA D20 @ 85 mL/hr x 1 hour    @ 165 mL/hr x 11 hours    @ 85 mL/hr x last hour   + 20% lipids, 500 mL 3x/week (MWF)   + MVI, 10 units insulin/L  -- This provides a daily average of 2179 kcal, 99 g protein in 1985 mL, 397 grams CHO-- meeting 87% and 90% of estimated kcal and protein needs, respectively. -- Additional IV if providing 1800 mL/day    -- Above goal to provide a daily average of 2546 kcal, 120 g protein, 481 gm CHO, and 2406 mL-- meeting 100 % of estimated needs. Once TPN increased to goal, would increase insulin in TPN. Fistula Output:  4500 mL (10/18)-- po intake also documented as 1662 mL(?)  1550 mL recorded so far today    Estimated Nutrition Needs:   Kcals/day: 2491 Kcals/day (5849-3955 kcal/day (MSJ x 1.2-1.3))  Protein: 110 g (110-136 g/day (2-2.5 g/kg IBW))  Fluid: 2500 ml (1 mL/kcal (adjust per fistula output))     Based On: [facility]  Weight Used:  (Actual wt of 141.6 kg (kcal needs); IBW of 55 kg (pro needs)  )    RD to follow.     [address]

## 2017-10-19 NOTE — PROGRESS NOTES
[address]  [address] [facility], [address]       GI PROGRESS NOTE  Will Mayme Pair  [phone] office  [phone] NP/PA in-hospital cell phone M-F until 4:30PM  After 5PM or on weekends, please call  for physician on call      NAME: Macho Rasheed   :  1975   MRN:  [id number]       Subjective:   Patient is tearful on examination with increased complaints of abdominal pain this morning.  + Nausea. No vomiting. She has tolerated clear liquids. Objective:     VITALS:   Last 24hrs VS reviewed since prior progress note. Most recent are:  Visit Vitals    BP (!) 143/91 (BP 1 Location: Left arm, BP Patient Position: At rest)    Pulse 88    Temp 98.3 °F (36.8 °C)    Resp 18    Ht 5' 4\" (1.626 m)    Wt 141.1 kg (311 lb 1.1 oz)    SpO2 100%    BMI 53.39 kg/m2       PHYSICAL EXAM:  General: Cooperative, no acute distress    Neurologic:  Alert and oriented X 3. HEENT: EOMI, no scleral icterus   Lungs:  CTA bilaterally  Heart:  S1 S2, regular rhythm, no murmur   Abdomen: Obese, non-distended, diffuse tenderness to palpation. Active bowel sounds. Fistula with dressing in place. LLQ ostomy with minimal output. Extremities: No edema  Psych:   Good insight. Anxious.      Lab Data Reviewed:     Recent Results (from the past 24 hour(s))   GLUCOSE, POC    Collection Time: 10/18/17 12:09 PM   Result Value Ref Range    Glucose (POC) 119 (H) 65 - 100 mg/dL    Performed by Cy Bai    GLUCOSE, POC    Collection Time: 10/18/17  3:00 PM   Result Value Ref Range    Glucose (POC) 127 (H) 65 - 100 mg/dL    Performed by Cy Bai    GLUCOSE, POC    Collection Time: 10/18/17  4:42 PM   Result Value Ref Range    Glucose (POC) 149 (H) 65 - 100 mg/dL    Performed by [employer]    GLUCOSE, POC    Collection Time: 10/18/17  9:24 PM   Result Value Ref Range    Glucose (POC) 225 (H) 65 - 100 mg/dL    Performed by [address] [place] [address], BASIC    Collection Time: 10/19/17 12:38 AM   Result Value Ref Range    Sodium 133 (L) 136 - 145 mmol/L    Potassium 4.9 3.5 - 5.1 mmol/L    Chloride 106 97 - 108 mmol/L    CO2 16 (L) 21 - 32 mmol/L    Anion gap 11 5 - 15 mmol/L    Glucose 452 (H) 65 - 100 mg/dL    BUN 33 (H) 6 - 20 MG/DL    Creatinine 1.04 (H) 0.55 - 1.02 MG/DL    BUN/Creatinine ratio 32 (H) 12 - 20      GFR est AA >60 >60 ml/min/1.73m2    GFR est non-AA 59 (L) >60 ml/min/1.73m2    Calcium 9.2 8.5 - 10.1 MG/DL   MAGNESIUM    Collection Time: 10/19/17 12:38 AM   Result Value Ref Range    Magnesium 1.8 1.6 - 2.4 mg/dL   PHOSPHORUS    Collection Time: 10/19/17 12:38 AM   Result Value Ref Range    Phosphorus 4.5 2.6 - 4.7 MG/DL   GLUCOSE, POC    Collection Time: 10/19/17  4:13 AM   Result Value Ref Range    Glucose (POC) 269 (H) 65 - 100 mg/dL    Performed by Marlene Yanez         Assessment:   · Crohn's Disease: multiple abdominal surgeries; EC fistula    · Acute on chronic abdominal pain: Surgery evaluated with no immediate surgical plans for fistula. Tentative plans to repair fistula in January. Patient Active Problem List   Diagnosis Code    [facility]'s disease ([facility] K50.90    DVT (deep venous thrombosis) ([facility]) I82.409    Incarcerated ventral hernia K46.0    Right flank pain R10.9    Perforated bowel ([facility] K63.1    Abdominal pain R10.9    Small bowel ischemia ([facility]) K55.9    Superior mesenteric artery thrombosis ([facility]) K55.069    Enterocutaneous fistula K63.2    Acute hyponatremia E87.1    Leukocytosis D72.829    Wound, open, anterior abdominal wall S31.109A    SIRS (systemic inflammatory response syndrome) ([facility]) R65.10    DEAN (acute kidney injury) ([facility] N17.9     Plan:   · Continue TPN  · Pain management per palliative care  · Continue antibiotics and IV steroids. Will plan to transition to PO steroids. · Clear liquids. Once pain improves, will slowly advance diet.      Signed By: АНДРЕЙ Vanessa     10/19/2017  2:54 PM       GI Attending: Patient seen and examined. Palliative care assisting with her pain management and breakthrough pain at night. She is otherwise tolerating some liquids. I would advise continuing TPN, IV steroids and IV antibiotics. We can consider transition to PO steroids tomorrow. Will follow. Cain Melgar MD

## 2017-10-19 NOTE — CONSULTS
Palliative Medicine Consult  [place]: [phone] ([phone])    Patient Name: Miriam Guzman  YOB: 1975    Date of Initial Consult: 10/16/17  Reason for Consult: pain management  Requesting Provider: Dr. Elzbieta Connor  Primary Care Physician: Eliezer Vasques MD      SUMMARY:   Miriam Guzman is a 46 y.o. with a past history of Crohn's disease, multiple surgeries s/p total colectomy with end ileostomy, 6/7/17 exp lap w SB perf, 6/28/17 exp lap, lysis of adhesions, repair of enterotomy, SMA stent on L, 7/16/17 exp lap, lysis of adhesions, fistula exclusion with feeding tube placement, frozen abdomen/ wound vac assisted closure placement, prolonged TPN, MRSA wound infection, awaiting repair of EC fistula planned for Jan 2018 if stable for surgery (an none of her home Crohn's meds until after healed) who was admitted on 10/15/2017 from [facility] with a diagnosis of acute on chronic abdominal pain. CT abd/pelvis done. Thought to be from Crohn's flare and GI started on steroids. Has not been on her home Crohn's medication since earlier this year due to recurrent infections. Current medical issues leading to Palliative Medicine involvement include: pain management. Patient's mother, Peter Mortonbody is NOK   PALLIATIVE DIAGNOSES:   1. Acute on chronic abdominal pain ? Reason for increased pain. 2. Crohn's disease ? Flare  3. Chronic anxiety and depression, chronic benzo use  4. Debility, generalized muscle wasting  5. Nausea- multifactorial       PLAN:   1. Pain - unclear etiology to change in pain. Patient and mom report that pain meds had not recently been changed at [facility]. Thought to be Crohn's flare, GI started on steroids. Had tried Fentanyl patch 50mcg at [facility] w/out benefit, so stopped but we are concerned that she is absorbing the MSContin poorly. However did not feel the Fentanyl 100mcg patch and wonders if something similar to her home regimen would be better.    1. Change to MSContin 60mg bid and 100mg bedtime (pain worse at night). 2. Change to Morphine IR tablets 30mg every 3h prn. Pls try to use this before the IV Dilaudid (which I will continue) and do not give within 30 min of each other. 3. Continue Dilaudid PCA NO basal rate, with 0.4mg Q8min   (she used 6.8mg in past 8h)  4. Dilaudid 1mg IV every 3hr PRN pain not controlled by PCA  5. Pt sees Dr Zuleima White ([facility]) for chronic pain management. 6. Aware that we NEED to wean these IV meds so she can go home. Very tolerant now. Hopeful steroids will help. 7. TOMORROW I told pt that we are going to space out IV Dilaudid from every 3--> every 4h and perhaps the PCA to every 10 min demand. 2. Nausea  1. Compazine 5mg IV Q8hrs scheduled  2. zofran 4mg IV Q6hrs scheduled  3. Anxiety - patient chronically taking klonopin twice daily. 4. Continue Klonipin now at 0.5mg bid. 5. Communicated plan of care with: Palliative IDT; Dr Subramanian Stands ; Dr Deya Oconnor / TREATMENT PREFERENCES:   [====Goals of Care====]  GOALS OF CARE:  Patient / health care proxy stated goals: (pain management)  Goals not reviewed today        TREATMENT PREFERENCES:   Code Status: Full Code    Advance Care Planning:  Advance Care Planning 10/16/2017   Patient's Healthcare Decision Maker is: Legal Next of Camilo 71   Primary Decision Maker Name Sherman Weaver   Primary Decision Maker Phone Number [phone]   Primary Decision Maker Relationship to Patient Parent   Confirm Advance Directive None   Patient Would Like to Complete Advance Directive -       Other:    The palliative care team has discussed with patient / health care proxy about goals of care / treatment preferences for patient.  [====Goals of Care====]         HISTORY:     HPI/SUBJECTIVE:    The patient is:   [x] Verbal and participatory  [] Non-participatory due to:   46year old female who is known to our service from previous admission. She reports one day of acute change in abdominal pain.   She had been doing \"ok\" and was planning on seeing her pain management doctor on Wednesday of this week for recommendations about weaning. She doesn't think Shaun Said had changed anything recently with her pain meds. She was on 60 Q8 of MS contin, morphine PCA (1mg for a while, most recently 0.5mg PCA dose) and PRN dilaudid 1mg IV . At one point they tried getting her off the PCA, but she didn't tolerate that well because it was too many pills for her to digest, so PCA had been restarted. 10/19 Still does not feel Fentanyl patch at 100mcg working. Tried 6 doses of the Morphine liquid 20mg prn and did not help. Still using Dilaudid 1mg IV every 3h prn around the clock. No sedation or confusion. 10/18 Pt does not feel Fentanyl patch working. Hopeful to go home soon and realizes that she cannot go home on PCA. I think might do well w/ liquid morphine compared to her previous po home regimen. 10/17  The night was difficult, woke up with pain -- still needing IV dilaudid frequently. PCA use 17 doses in past 12 hours. (0.4mg each)  She used 1mg IV Dilaudid PRN about every 3-4 hours  Nausea is better but still a not good, wants some ginger ale. Clinical Pain Assessment (nonverbal scale for severity on nonverbal patients):   [++++ Clinical Pain Assessment++++]  [++++Pain Severity++++]: Pain: 6  [++++Pain Character++++]: stabbing  [++++Pain Duration++++]:   Bad for the last 24-48 hours  [++++Pain Effect++++]: nauseated  [++++Pain Factors++++]:   [++++Pain Frequency++++]: constant  [++++Pain Location++++]: all over abdomen  [++++ Clinical Pain Assessment++++]  Duration: for how long has pt been experiencing pain (e.g., 2 days, 1 month, years)  Frequency: how often pain is an issue (e.g., several times per day, once every few days, constant)     FUNCTIONAL ASSESSMENT:     Palliative Performance Scale (PPS):  PPS: 40       PSYCHOSOCIAL/SPIRITUAL SCREENING:     Advance Care Planning:  Advance Care Planning 10/16/2017 Patient's Healthcare Decision Maker is: Legal Next of Camilo 71   Primary Decision Maker Name Laura Rondon   Primary Decision Maker Phone Number [phone]   Primary Decision Maker Relationship to Patient Parent   Confirm Advance Directive None   Patient Would Like to Complete Advance Directive -        Any spiritual / Episcopalian concerns:  [] Yes /  [x] No    Caregiver Burnout:  [] Yes /  [x] No /  [] No Caregiver Present      Anticipatory grief assessment:   [x] Normal  / [] Maladaptive       ESAS Anxiety: Anxiety: 5    ESAS Depression: Depression: 5        REVIEW OF SYSTEMS:     Positive and pertinent negative findings in ROS are noted above in HPI. The following systems were [x] reviewed / [] unable to be reviewed as noted in HPI  Other findings are noted below. Systems: constitutional, ears/nose/mouth/throat, respiratory, gastrointestinal, genitourinary, musculoskeletal, integumentary, neurologic, psychiatric, endocrine. Positive findings noted below. Modified ESAS Completed by: provider   Fatigue: 6 Drowsiness: 0   Depression: 5 Pain: 6   Anxiety: 5 Nausea: 8   Anorexia: 8 Dyspnea: 0     Constipation: No              PHYSICAL EXAM:     From RN flowsheet:  Wt Readings from Last 3 Encounters:   10/19/17 311 lb 1.1 oz (141.1 kg)   10/03/17 338 lb (153.3 kg)   09/12/17 338 lb (153.3 kg)     Blood pressure (!) 143/91, pulse 88, temperature 98.3 °F (36.8 °C), resp. rate 18, height 5' 4\" (1.626 m), weight 311 lb 1.1 oz (141.1 kg), SpO2 100 %. Pain Scale 1: Numeric (0 - 10)  Pain Intensity 1: 8  Pain Onset 1: chronic  Pain Location 1: Abdomen  Pain Orientation 1: Upper  Pain Description 1: Aching  Pain Intervention(s) 1: Medication (see MAR)  Last bowel movement, if known: today     Constitutional: pt is awake, alert, chronically ill appearing, anxious   Eyes: Clear  ENT: moist mucous membranes, nares patent  Chest: Regular rhythm   Resp: CTA, unlabored  Abd: Mult wounds w/ leakage from EC fistula. +BS. TTP.    Msk: No abnormalities  Neuro: moving all extremities   Psych: anxious            HISTORY:     Principal Problem:    SIRS (systemic inflammatory response syndrome) (HCC) (10/16/2017)    Active Problems:    Abdominal pain (6/25/2017)      Acute hyponatremia (10/16/2017)      Leukocytosis (10/16/2017)      Wound, open, anterior abdominal wall (10/16/2017)      DEAN (acute kidney injury) ([facility] (10/16/2017)      Past Medical History:   Diagnosis Date    Crohn's disease ([facility] 8/15/2011    DVT (deep venous thrombosis) ([facility] 8/15/2011    Incarcerated ventral hernia 8/15/2011    Right flank pain 8/22/2011    Seizures ([facility]     Stroke [facility])       Past Surgical History:   Procedure Laterality Date    HX OTHER SURGICAL      multiple procedures related to her Crohn's disease    IA LAP, INCISIONAL HERNIA REPAIR,INCARCERATED  9-10-08    dr. Debbie Calhoun      Family History   Problem Relation Age of Onset    Hypertension Father     Stroke Father     Other Father      arthritis      History reviewed, no pertinent family history.   Social History   Substance Use Topics    Smoking status: Former Smoker    Smokeless tobacco: Not on file    Alcohol use No     Allergies   Allergen Reactions    Demerol [Meperidine] Unknown (comments)    Soma [Carisoprodol] Rash and Nausea Only    Sulfa (Sulfonamide Antibiotics) Unknown (comments)    Toradol [Ketorolac Tromethamine] Unknown (comments)      Current Facility-Administered Medications   Medication Dose Route Frequency    enoxaparin (LOVENOX) injection 40 mg  40 mg SubCUTAneous Q24H    morphine CR (MS CONTIN) tablet 60 mg  60 mg Oral BID    morphine CR (MS CONTIN) tablet 100 mg  100 mg Oral QHS    morphine IR (MS IR) tablet 30 mg  30 mg Oral Q3H PRN    0.9% sodium chloride infusion  75 mL/hr IntraVENous CONTINUOUS    glucose chewable tablet 16 g  4 Tab Oral PRN    dextrose (D50W) injection syrg 12.5-25 g  12.5-25 g IntraVENous PRN    glucagon (GLUCAGEN) injection 1 mg  1 mg IntraMUSCular PRN    insulin lispro (HUMALOG) injection   SubCUTAneous Q6H    prochlorperazine (COMPAZINE) with saline injection 5 mg  5 mg IntraVENous Q12H    fat emulsion 20% (LIPOSYN, INTRAlipid) infusion 500 mL  500 mL IntraVENous Q MON, WED & FRI    clonazePAM (KlonoPIN) tablet 0.5 mg  0.5 mg Oral BID    methylPREDNISolone (PF) (SOLU-MEDROL) injection 20 mg  20 mg IntraVENous Q6H    sodium chloride (NS) flush 5-10 mL  5-10 mL IntraVENous Q8H    sodium chloride (NS) flush 5-10 mL  5-10 mL IntraVENous PRN    HYDROmorphone (PF) 15 mg/30 ml (DILAUDID) PCA   IntraVENous TITRATE    sodium chloride (NS) flush 5-10 mL  5-10 mL IntraVENous PRN    piperacillin-tazobactam (ZOSYN) 3.375 g in 0.9% sodium chloride (MBP/ADV) 100 mL  3.375 g IntraVENous Q8H    famotidine (PF) (PEPCID) 20 mg in sodium chloride 0.9 % 10 mL injection  20 mg IntraVENous Q12H    ondansetron (ZOFRAN) injection 4 mg  4 mg IntraVENous Q6H    HYDROmorphone (PF) (DILAUDID) injection 1 mg  1 mg IntraVENous Q3H PRN    sodium chloride (NS) flush 5-10 mL  5-10 mL IntraVENous PRN          LAB AND IMAGING FINDINGS:     Lab Results   Component Value Date/Time    WBC 20.9 10/18/2017 05:21 AM    HGB 9.8 10/18/2017 05:21 AM    PLATELET 570 19/86/1578 05:21 AM     Lab Results   Component Value Date/Time    Sodium 133 10/19/2017 12:38 AM    Potassium 4.9 10/19/2017 12:38 AM    Chloride 106 10/19/2017 12:38 AM    CO2 16 10/19/2017 12:38 AM    BUN 33 10/19/2017 12:38 AM    Creatinine 1.04 10/19/2017 12:38 AM    Calcium 9.2 10/19/2017 12:38 AM    Magnesium 1.8 10/19/2017 12:38 AM    Phosphorus 4.5 10/19/2017 12:38 AM      Lab Results   Component Value Date/Time    AST (SGOT) 23 10/18/2017 05:21 AM    Alk.  phosphatase 199 10/18/2017 05:21 AM    Protein, total 7.3 10/18/2017 05:21 AM    Albumin 2.4 10/18/2017 05:21 AM    Globulin 4.9 10/18/2017 05:21 AM     No results found for: INR, PTMR, PTP, PT1, PT2, APTT   No results found for: IRON, FE, TIBC, IBCT, PSAT, FERR   No results found for: PH, PCO2, PO2  No components found for: GLPOC   No results found for: CPK, CKMB             Total time:   Counseling / coordination time, spent as noted above:   > 50% counseling / coordination?:     Prolonged service was provided for  []30 min   []75 min in face to face time in the presence of the patient, spent as noted above. Time Start:   Time End:   Note: this can only be billed with 43431 (initial) or 02695 (follow up). If multiple start / stop times, list each separately.

## 2017-10-19 NOTE — PROGRESS NOTES
Spiritual Care Assessment/Progress Notes    Garth Stern [id number]  [id number]    1975  46 y.o.  female    Patient Telephone Number: [phone] (home)   Baptist Affiliation: Non Scientology   Language: English   Extended Emergency Contact Information  Primary Emergency Contact: Sofiya Garcia  Address: [address] Phone: [phone]  Work Phone: [phone]  Mobile Phone: [phone]  Relation: Parent   Patient Active Problem List    Diagnosis Date Noted    Acute hyponatremia 10/16/2017    Leukocytosis 10/16/2017    Wound, open, anterior abdominal wall 10/16/2017    SIRS (systemic inflammatory response syndrome) ([address] 10/16/2017    DEAN (acute kidney injury) ([address] 10/16/2017    Enterocutaneous fistula 06/30/2017    Small bowel ischemia ([address] 06/28/2017    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    Abdominal pain 06/25/2017    Perforated bowel ([address] 06/06/2017    Right flank pain 08/22/2011    Crohn's disease ([address] 08/15/2011    DVT (deep venous thrombosis) ([address] 08/15/2011    Incarcerated ventral hernia 08/15/2011        Date: 10/19/2017       Level of Baptist/Spiritual Activity:  [x]         Involved in jalen tradition/spiritual practice    []         Not involved in jalen tradition/spiritual practice  []         Spiritually oriented    []         Claims no spiritual orientation    []         seeking spiritual identity  []         Feels alienated from Orthodoxy practice/tradition  []         Feels angry about Orthodoxy practice/tradition  [x]         Spirituality/Orthodoxy tradition is a resource for coping at this time.   []         Not able to assess due to medical condition    Services Provided Today:  []         crisis intervention    []         reading Scriptures  [x]         spiritual assessment    [x]         prayer  [x]         empathic listening/emotional support  []         rites and rituals (cite in comments)  []         life review     []         Anabaptism support  []         theological development   []         advocacy  []         ethical dialog     []         blessing  []         bereavement support    []         support to family  []         anticipatory grief support   []         help with AMD  []         spiritual guidance    []         meditation      Spiritual Care Needs  [x]         Emotional Support  [x]         Spiritual/Uatsdin Care  []         Loss/Adjustment  []         Advocacy/Referral                /Ethics  []         No needs expressed at               this time  []         Other: (note in               comments)  [address]  []         Follow up visits with               pt/family  []         Provide materials  []         Schedule sacraments  []         Contact Community               Clergy  [x]         Follow up as needed  []         Other: (note in               comments)     Comments: Supportive visit with Ms. Kwong. Pt is known to this  from previous hospitalization. Offered listening presence as pt shared some of her experiences as she karl with her medical condition. She was tearful at times as she engaged in conversation. Normalized her emotions and explored means of coping. Patient is appreciative of spiritual support. She has a  who visits her regularly. She was receptive to my offer of prayer. Following visit, pt case discussed with music therapist who has visited pt in the past.  Pt indicated that she did not desire music therapy at this time - assured her she could request it in the future if desired. Chaplains will continue to follow for support.     Bhumika Vivar, Palliative

## 2017-10-19 NOTE — DIABETES MGMT
DTC Progress Note    Recommendations/ Comments: Chart reviewed due to hyperglycemia likely due to steroids. Pt's blood sugars 119-269 mg/dl and pt has required 9 units of correction insulin over the last 24 hours. If appropriate, please consider changing correction scale to resistant scale while patient is on steroids. Chart reviewed on [address] Patient is a 46 y.o. female with no documented history of diabetes per H&P. A1c:   No results found for: HBA1C, HGBE8, XCJ0IAOD    Recent Glucose Results: Lab Results   Component Value Date/Time     (H) 10/19/2017 12:38 AM    GLUCPOC 166 (H) 10/19/2017 09:35 AM    GLUCPOC 269 (H) 10/19/2017 04:13 AM    GLUCPOC 225 (H) 10/18/2017 09:24 PM        Lab Results   Component Value Date/Time    Creatinine 1.04 10/19/2017 12:38 AM     Estimated Creatinine Clearance: 102.4 mL/min (based on Cr of 1.04). Active Orders   Diet    DIET CLEAR LIQUID        PO intake: Patient Vitals for the past 72 hrs:   % Diet Eaten   10/19/17 1034 20 %       Current hospital DM medication: correction scale Humalog, normal sensitivity. Will continue to follow as needed.     Thank you  Lenard Belcher RD, CDE

## 2017-10-20 LAB
ANION GAP SERPL CALC-SCNC: 7 MMOL/L (ref 5–15)
BACTERIA SPEC CULT: NORMAL
BUN SERPL-MCNC: 33 MG/DL (ref 6–20)
BUN/CREAT SERPL: 33 (ref 12–20)
CALCIUM SERPL-MCNC: 8.8 MG/DL (ref 8.5–10.1)
CALPROTECTIN STL-MCNT: 172 UG/G (ref 0–120)
CHLORIDE SERPL-SCNC: 109 MMOL/L (ref 97–108)
CO2 SERPL-SCNC: 20 MMOL/L (ref 21–32)
CREAT SERPL-MCNC: 1.01 MG/DL (ref 0.55–1.02)
ERYTHROCYTE [DISTWIDTH] IN BLOOD BY AUTOMATED COUNT: 17.2 % (ref 11.5–14.5)
GLUCOSE BLD STRIP.AUTO-MCNC: 143 MG/DL (ref 65–100)
GLUCOSE BLD STRIP.AUTO-MCNC: 156 MG/DL (ref 65–100)
GLUCOSE BLD STRIP.AUTO-MCNC: 249 MG/DL (ref 65–100)
GLUCOSE SERPL-MCNC: 262 MG/DL (ref 65–100)
HCT VFR BLD AUTO: 31.2 % (ref 35–47)
HGB BLD-MCNC: 9.9 G/DL (ref 11.5–16)
MAGNESIUM SERPL-MCNC: 1.8 MG/DL (ref 1.6–2.4)
MCH RBC QN AUTO: 27.5 PG (ref 26–34)
MCHC RBC AUTO-ENTMCNC: 31.7 G/DL (ref 30–36.5)
MCV RBC AUTO: 86.7 FL (ref 80–99)
PLATELET # BLD AUTO: 280 K/UL (ref 150–400)
POTASSIUM SERPL-SCNC: 4.2 MMOL/L (ref 3.5–5.1)
RBC # BLD AUTO: 3.6 M/UL (ref 3.8–5.2)
SERVICE CMNT-IMP: ABNORMAL
SERVICE CMNT-IMP: NORMAL
SODIUM SERPL-SCNC: 136 MMOL/L (ref 136–145)
WBC # BLD AUTO: 21.3 K/UL (ref 3.6–11)

## 2017-10-20 PROCEDURE — 74011250636 HC RX REV CODE- 250/636: Performed by: PHYSICIAN ASSISTANT

## 2017-10-20 PROCEDURE — 77030010520

## 2017-10-20 PROCEDURE — 74011250636 HC RX REV CODE- 250/636: Performed by: FAMILY MEDICINE

## 2017-10-20 PROCEDURE — 77030010540 HC BG OST DRN BMS -A

## 2017-10-20 PROCEDURE — 74011000250 HC RX REV CODE- 250: Performed by: HOSPITALIST

## 2017-10-20 PROCEDURE — 74011000258 HC RX REV CODE- 258: Performed by: HOSPITALIST

## 2017-10-20 PROCEDURE — 74011000250 HC RX REV CODE- 250: Performed by: NURSE PRACTITIONER

## 2017-10-20 PROCEDURE — 36415 COLL VENOUS BLD VENIPUNCTURE: CPT | Performed by: HOSPITALIST

## 2017-10-20 PROCEDURE — 80048 BASIC METABOLIC PNL TOTAL CA: CPT | Performed by: HOSPITALIST

## 2017-10-20 PROCEDURE — 77030010541

## 2017-10-20 PROCEDURE — 85027 COMPLETE CBC AUTOMATED: CPT | Performed by: HOSPITALIST

## 2017-10-20 PROCEDURE — 74011000250 HC RX REV CODE- 250: Performed by: INTERNAL MEDICINE

## 2017-10-20 PROCEDURE — 77030012918 HC BG WND FIST BMS -A

## 2017-10-20 PROCEDURE — 74011250637 HC RX REV CODE- 250/637: Performed by: INTERNAL MEDICINE

## 2017-10-20 PROCEDURE — 74011250636 HC RX REV CODE- 250/636: Performed by: HOSPITALIST

## 2017-10-20 PROCEDURE — 74011636637 HC RX REV CODE- 636/637: Performed by: HOSPITALIST

## 2017-10-20 PROCEDURE — 74011250636 HC RX REV CODE- 250/636: Performed by: INTERNAL MEDICINE

## 2017-10-20 PROCEDURE — 65270000029 HC RM PRIVATE

## 2017-10-20 PROCEDURE — 74011000258 HC RX REV CODE- 258: Performed by: FAMILY MEDICINE

## 2017-10-20 PROCEDURE — 83735 ASSAY OF MAGNESIUM: CPT | Performed by: HOSPITALIST

## 2017-10-20 PROCEDURE — 74011250637 HC RX REV CODE- 250/637: Performed by: PHYSICAL MEDICINE & REHABILITATION

## 2017-10-20 PROCEDURE — 82962 GLUCOSE BLOOD TEST: CPT

## 2017-10-20 PROCEDURE — 74011250637 HC RX REV CODE- 250/637: Performed by: HOSPITALIST

## 2017-10-20 RX ORDER — SODIUM CHLORIDE 0.9 % (FLUSH) 0.9 %
10 SYRINGE (ML) INJECTION AS NEEDED
Status: DISCONTINUED | OUTPATIENT
Start: 2017-10-20 | End: 2017-10-27 | Stop reason: HOSPADM

## 2017-10-20 RX ORDER — SODIUM CHLORIDE 0.9 % (FLUSH) 0.9 %
10 SYRINGE (ML) INJECTION EVERY 24 HOURS
Status: DISCONTINUED | OUTPATIENT
Start: 2017-10-20 | End: 2017-10-27 | Stop reason: HOSPADM

## 2017-10-20 RX ORDER — BACITRACIN 500 UNIT/G
1 PACKET (EA) TOPICAL AS NEEDED
Status: DISCONTINUED | OUTPATIENT
Start: 2017-10-20 | End: 2017-10-27 | Stop reason: HOSPADM

## 2017-10-20 RX ORDER — SODIUM CHLORIDE 0.9 % (FLUSH) 0.9 %
20 SYRINGE (ML) INJECTION AS NEEDED
Status: DISCONTINUED | OUTPATIENT
Start: 2017-10-20 | End: 2017-10-27 | Stop reason: HOSPADM

## 2017-10-20 RX ORDER — HYDROMORPHONE HYDROCHLORIDE 1 MG/ML
1 INJECTION, SOLUTION INTRAMUSCULAR; INTRAVENOUS; SUBCUTANEOUS
Status: DISCONTINUED | OUTPATIENT
Start: 2017-10-20 | End: 2017-10-24

## 2017-10-20 RX ORDER — PREDNISONE 10 MG/1
40 TABLET ORAL
Status: DISCONTINUED | OUTPATIENT
Start: 2017-10-20 | End: 2017-10-21

## 2017-10-20 RX ORDER — INSULIN LISPRO 100 [IU]/ML
INJECTION, SOLUTION INTRAVENOUS; SUBCUTANEOUS EVERY 6 HOURS
Status: DISCONTINUED | OUTPATIENT
Start: 2017-10-20 | End: 2017-10-27 | Stop reason: HOSPADM

## 2017-10-20 RX ORDER — SODIUM CHLORIDE 0.9 % (FLUSH) 0.9 %
10 SYRINGE (ML) INJECTION EVERY 8 HOURS
Status: DISCONTINUED | OUTPATIENT
Start: 2017-10-20 | End: 2017-10-27 | Stop reason: HOSPADM

## 2017-10-20 RX ADMIN — MORPHINE SULFATE 60 MG: 60 TABLET, FILM COATED, EXTENDED RELEASE ORAL at 09:14

## 2017-10-20 RX ADMIN — I.V. FAT EMULSION 500 ML: 20 EMULSION INTRAVENOUS at 19:01

## 2017-10-20 RX ADMIN — HYDROMORPHONE HYDROCHLORIDE 1 MG: 1 INJECTION, SOLUTION INTRAMUSCULAR; INTRAVENOUS; SUBCUTANEOUS at 04:12

## 2017-10-20 RX ADMIN — FAMOTIDINE 20 MG: 10 INJECTION, SOLUTION INTRAVENOUS at 09:16

## 2017-10-20 RX ADMIN — Medication 10 ML: at 21:37

## 2017-10-20 RX ADMIN — HYDROMORPHONE HYDROCHLORIDE 1 MG: 1 INJECTION, SOLUTION INTRAMUSCULAR; INTRAVENOUS; SUBCUTANEOUS at 00:48

## 2017-10-20 RX ADMIN — MORPHINE SULFATE 100 MG: 100 TABLET, EXTENDED RELEASE ORAL at 21:37

## 2017-10-20 RX ADMIN — MORPHINE SULFATE 30 MG: 15 TABLET ORAL at 10:12

## 2017-10-20 RX ADMIN — SODIUM CHLORIDE 75 ML/HR: 900 INJECTION, SOLUTION INTRAVENOUS at 11:57

## 2017-10-20 RX ADMIN — Medication 10 ML: at 14:07

## 2017-10-20 RX ADMIN — INSULIN LISPRO 3 UNITS: 100 INJECTION, SOLUTION INTRAVENOUS; SUBCUTANEOUS at 09:15

## 2017-10-20 RX ADMIN — ENOXAPARIN SODIUM 40 MG: 40 INJECTION SUBCUTANEOUS at 11:16

## 2017-10-20 RX ADMIN — PIPERACILLIN SODIUM,TAZOBACTAM SODIUM 3.38 G: 3; .375 INJECTION, POWDER, FOR SOLUTION INTRAVENOUS at 14:06

## 2017-10-20 RX ADMIN — FAMOTIDINE 20 MG: 10 INJECTION, SOLUTION INTRAVENOUS at 20:32

## 2017-10-20 RX ADMIN — ONDANSETRON 4 MG: 2 INJECTION INTRAMUSCULAR; INTRAVENOUS at 17:58

## 2017-10-20 RX ADMIN — PREDNISONE 40 MG: 10 TABLET ORAL at 11:54

## 2017-10-20 RX ADMIN — MORPHINE SULFATE 30 MG: 15 TABLET ORAL at 14:33

## 2017-10-20 RX ADMIN — INSULIN LISPRO 3 UNITS: 100 INJECTION, SOLUTION INTRAVENOUS; SUBCUTANEOUS at 03:33

## 2017-10-20 RX ADMIN — Medication 10 ML: at 14:00

## 2017-10-20 RX ADMIN — Medication 10 ML: at 06:16

## 2017-10-20 RX ADMIN — MORPHINE SULFATE 60 MG: 60 TABLET, FILM COATED, EXTENDED RELEASE ORAL at 17:58

## 2017-10-20 RX ADMIN — METHYLPREDNISOLONE SODIUM SUCCINATE 20 MG: 40 INJECTION, POWDER, FOR SOLUTION INTRAMUSCULAR; INTRAVENOUS at 06:20

## 2017-10-20 RX ADMIN — HYDROMORPHONE HYDROCHLORIDE 1 MG: 1 INJECTION, SOLUTION INTRAMUSCULAR; INTRAVENOUS; SUBCUTANEOUS at 07:25

## 2017-10-20 RX ADMIN — MORPHINE SULFATE 30 MG: 15 TABLET ORAL at 03:34

## 2017-10-20 RX ADMIN — ONDANSETRON 4 MG: 2 INJECTION INTRAMUSCULAR; INTRAVENOUS at 06:18

## 2017-10-20 RX ADMIN — CLONAZEPAM 0.5 MG: 0.5 TABLET ORAL at 20:32

## 2017-10-20 RX ADMIN — HYDROMORPHONE HYDROCHLORIDE 1 MG: 1 INJECTION, SOLUTION INTRAMUSCULAR; INTRAVENOUS; SUBCUTANEOUS at 20:32

## 2017-10-20 RX ADMIN — HYDROMORPHONE HYDROCHLORIDE 1 MG: 1 INJECTION, SOLUTION INTRAMUSCULAR; INTRAVENOUS; SUBCUTANEOUS at 11:51

## 2017-10-20 RX ADMIN — MORPHINE SULFATE 30 MG: 15 TABLET ORAL at 06:32

## 2017-10-20 RX ADMIN — HYDROMORPHONE HYDROCHLORIDE 1 MG: 1 INJECTION, SOLUTION INTRAMUSCULAR; INTRAVENOUS; SUBCUTANEOUS at 16:26

## 2017-10-20 RX ADMIN — PIPERACILLIN SODIUM,TAZOBACTAM SODIUM 3.38 G: 3; .375 INJECTION, POWDER, FOR SOLUTION INTRAVENOUS at 06:23

## 2017-10-20 RX ADMIN — Medication 10 ML: at 14:08

## 2017-10-20 RX ADMIN — MORPHINE SULFATE 30 MG: 15 TABLET ORAL at 18:22

## 2017-10-20 RX ADMIN — MORPHINE SULFATE 30 MG: 15 TABLET ORAL at 21:37

## 2017-10-20 RX ADMIN — Medication 10 ML: at 04:13

## 2017-10-20 RX ADMIN — MORPHINE SULFATE 30 MG: 15 TABLET ORAL at 00:11

## 2017-10-20 RX ADMIN — Medication 10 ML: at 12:00

## 2017-10-20 RX ADMIN — Medication: at 14:34

## 2017-10-20 RX ADMIN — ASCORBIC ACID, VITAMIN A PALMITATE, CHOLECALCIFEROL, THIAMINE HYDROCHLORIDE, RIBOFLAVIN-5 PHOSPHATE SODIUM, PYRIDOXINE HYDROCHLORIDE, NIACINAMIDE, DEXPANTHENOL, ALPHA-TOCOPHEROL ACETATE, VITAMIN K1, FOLIC ACID, BIOTIN, CYANOCOBALAMIN: 200; 3300; 200; 6; 3.6; 6; 40; 15; 10; 150; 600; 60; 5 INJECTION, SOLUTION INTRAVENOUS at 18:45

## 2017-10-20 RX ADMIN — ONDANSETRON 4 MG: 2 INJECTION INTRAMUSCULAR; INTRAVENOUS at 11:16

## 2017-10-20 RX ADMIN — CLONAZEPAM 0.5 MG: 0.5 TABLET ORAL at 09:14

## 2017-10-20 RX ADMIN — INSULIN LISPRO 2 UNITS: 100 INJECTION, SOLUTION INTRAVENOUS; SUBCUTANEOUS at 18:39

## 2017-10-20 RX ADMIN — SODIUM CHLORIDE 5 MG: 9 INJECTION INTRAMUSCULAR; INTRAVENOUS; SUBCUTANEOUS at 06:16

## 2017-10-20 NOTE — PROGRESS NOTES
Bedside and Verbal shift change report given to MAURI Cortez (oncoming nurse) by Andrew Lanza RN (offgoing nurse). Report included the following information SBAR, Kardex, Intake/Output, MAR and Accordion.

## 2017-10-20 NOTE — PROGRESS NOTES
Follow up visit with Katey Davis after pt's case discussed with Palliative IDT and Spiritual Care team.  Pt's mother was present at time of visit. Pt shared of the special relationship with her mother and engaged briefly in sharing some stories from her past. She smiled at times during conversation. She shared about her dog [place] and then became tearful as she reflected on how long it has been since she has seen him. Offered emotional and spiritual support. Visit ended as pt needed assistance from her nurse. Offered words of blessing and assurance. Chaplains will continue to follow as able/needed.     Bhumika Bradford, Palliative

## 2017-10-20 NOTE — PROGRESS NOTES
Hospitalist Progress Note                                            Date of Service:  10/20/2017  NAME:  Nina Smith  :  1975  MRN:  [id number]      Admission Summary:   Per H&P \"The patient is a 82-year-old white   female with past medical history of Crohn disease, DVT, incarcerated   ventral hernia, chronic abdominal pain, morbid obesity, seizures,   stroke and multiple repeat abdominal surgeries, presented to the   emergency department via EMS from  [facility] with chief   complaint of abdominal pain. The patient has chronic abdominal pain. She has been on Dilaudid at [facility]. She notably has been on a PCA   according to her mother's report. In addition, she takes additional   doses of Dilaudid intermittently. Tonight she complains of stabbing,   severe generalized abdominal pain mostly in epigastrium according to   initial reports, rated at 10/10,  aggravated with any motion and   movement, without specific alleviating factors. She complains   of nausea, vomiting, chills, cough productive of clear sputum and per   the ER soreness near her vagina. She has open abdominal wound,   enterocutaneous fistula, which drains into collection bag. The patient   notes there has been some leakage around the fistula. She also has a   ileostomy and her mother notes there has been some \"blood\" seen   within the ostomy bag. The patient's recent history has been   complicated over the last several months. She reportedly had   undergone capsule endoscopy. She was subsequently admitted on   2017 secondary to retained video capsule. She underwent single   balloon enteroscopy on 2017 with iatrogenic bowel perforation   during enteroscopy.  She reportedly notably underwent diagnostic   laparoscopy converted to open exploratory laparotomy with extensive   (7-hour) lysis of adhesions with intraoperative upper endoscopy   performed and physical findings of a sealed perforated jejunum. Postoperatively the patient was noted to have MRSA wound infection   treated with IV vancomycin. She was discharged home with oral   antibiotics. The patient returned to the emergency department on   06/25/2017 with abdominal pain. She went back to OR on 06/28/2017   with this diagnosis of ischemic bowel and underwent exploratory   laparotomy, extensive lysis of adhesions with repair of enterotomy and   SMA stent on the left. Per review of chart records patient developed a   fistula  which required daily wound care, started on TPN. She was   essentially bed bound during the hospitalization and has   required multiple drains, which were eventually removed and replaced   with a wound VAC. She was eventually discharged to [facility]   with morphine PCA pump, TPN as well as additional oral pain   medications with intractable pain. During hospitalization, the patient   was seen by general surgery, vascular surgery, infectious disease,   cardiology, pulmonary  and palliative care services. The patient has   continued to require long-term skilled nursing care. She was   reportedly  recently treated for a UTI. There are no complaints   of syncope, loss of consciousness, new onset numbness,   paresthesias, slurred speech, facial droop, visual disturbance,   headache, neck pain, back pain, chest pain although at present, she   complains of chronic chest wall pain with light palpation and   hypersensitivity of her skin. She has not complained dysuria or   hematuria, increased calf pain, swelling, edema, fever, chills or rash. On arrival in emergency department tonight, initial recorded vital signs   were blood pressure 122/43, heart rate 128, respiratory rate of 20, O2   saturation is 100% on room air.  Workup included CT abdomen and   pelvis without contrast which showed large anterior abdominal wall and   site of previous known enterocutaneous fistula with mild stranding   around anterior abdominal wound as well as abdominal mesentery,   nonspecific wall thickening and inflammatory appearance of the   duodenum with nonspecific wall thickening with inflammatory   appearance of the duodenum. Labs showed elevated WBC 23,500. The patient had elevated BUN 94, creatinine of 2.26 and GFR of 24. Per the ED the patient was given Dilaudid 2 mg IV x2 doses, Zofran 8   mg IV x1 dose, Zofran 3.375 grams IV, levofloxacin 750 mg IV,   vancomycin 2000 mg IV. Blood cultures have been sent. The patient is   now seen for admission to the hospitalist service for continued   evaluation and treatments. The patient has already been seen by   general surgeon in consultation\". Interval history / Subjective:   A bit better today; abd pain is improved. No nausea or emesis today. States she only feels nauseous when she gets IV Zosyn. Tolerating clears. States her urine was \"hot\" after it was out in the container, but no burning or discomfort with urination     Assessment & Plan:   Acute on chronic abdominal pain: (POA) the etiology is unclear. This may be a Crohn's flare vs worsening of chronic pain  -CT abdomen and pelvis showed arge anterior abdominal wound, and a site of previous/known enterocutaneous fistula though this is difficult to assess given lack of oral and IV contrastmaterial. Mild stranding around the anterior abdominal wound as well as in the abdominal mesentery. Nonspecific wall thickening/inflammatory appearance of the duodenum.  -Not clear what caused this acute worsening of the pain. Chron's flare possible.  -palliative care consulted, recs appreciated.  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 Wound care team following. Systemic inflammatory response syndrome (POA)   -likely due to Crohn's, Intra-abdominal infection possible  -stopped levofloxacin 10/17, vancomycin stopped 10/18, d/c Zosyn today (10/20)    Hyponatremia Improved with fluids; monitor     Hypokalemia, hypomagnesemia, hypophosphatemia: Resolved with adjusting TPN; monitor    Hypercalcemia: mild, likely due to TPN; improved after adjusting TPN    Hyperglycemia: due to TPN; increase insulin in TPN to 15 U/L    DEAN pre-renal, resolved with IV fluids    Chronic pain syndrome: palliative care management appreciated    Morbid obesity    Diet: clears, TPN  Code status: full  DVT prophylaxis: sq Lovenox  Dispo: likely [facility] when abd pain improves    Hospital Problems  Date Reviewed: 10/16/2017          Codes Class Noted POA    Acute hyponatremia ICD-10-CM: E87.1  ICD-9-CM: 276.1  10/16/2017 Unknown        Leukocytosis ICD-10-CM: D72.829  ICD-9-CM: 288.60  10/16/2017 Unknown        Wound, open, anterior abdominal wall ICD-10-CM: S31.109A  ICD-9-CM: 879.2  10/16/2017 Unknown        * (Principal)SIRS (systemic inflammatory response syndrome) ([facility] ICD-10-CM: R65.10  ICD-9-CM: 995.90  10/16/2017 Unknown        DEAN (acute kidney injury) ([facility] ICD-10-CM: N17.9  ICD-9-CM: 584.9  10/16/2017 Unknown        Abdominal pain ICD-10-CM: R10.9  ICD-9-CM: 789.00  6/25/2017 Unknown                Review of Systems:   Pertinent items are noted in HPI. Physical Examination:      Last 24hrs VS reviewed since prior progress note.  Most recent are:  Visit Vitals    /82 (BP 1 Location: Left arm, BP Patient Position: At rest)    Pulse 85    Temp 97.9 °F (36.6 °C)    Resp 18    Ht 5' 4\" (1.626 m)    Wt 141.4 kg (311 lb 11.7 oz)    SpO2 100%    BMI 53.51 kg/m2           Constitutional:  Drowsy, NAD   ENT:  anicteric sclerae, pale conjunctiva, MMM       Resp:  CTA b/l, normal work of breathing   CV:  RRR, no MRG, no JVD, no peripheral edema    GI:  wounds dressed, bag over fistula draining yellow fluid, abd is obese otherwise   :          Musculoskeletal:  No edema, warm,    Neurologic:  EOMI, CN grossly intact, normal speech, moves all extremities              Intake/Output Summary (Last 24 hours) at 10/20/17 1542  Last data filed at 10/20/17 1444   Gross per 24 hour   Intake              850 ml   Output             7025 ml   Net            -6175 ml          Data Review:    Review and/or order of clinical lab test  Review and/or order of tests in the radiology section of CPT  Review and/or order of tests in the medicine section of CPT      Labs:     Recent Labs      10/20/17   0337  10/18/17   0521   WBC  21.3*  20.9*   HGB  9.9*  9.8*   HCT  31.2*  30.3*   PLT  280  294     Recent Labs      10/20/17   0337  10/19/17   0038  10/18/17   0521   NA  136  133*  136   K  4.2  4.9  4.7   CL  109*  106  110*   CO2  20*  16*  17*   BUN  33*  33*  32*   CREA  1.01  1.04*  1.00   GLU  262*  452*  237*   CA  8.8  9.2  10.4*   MG  1.8  1.8  1.9   PHOS   --   4.5  3.8     Recent Labs      10/18/17   0521   SGOT  23   ALT  37   AP  199*   TBILI  0.3   TP  7.3   ALB  2.4*   GLOB  4.9*     No results for input(s): INR, PTP, APTT in the last 72 hours. No lab exists for component: INREXT, INREXT   No results for input(s): FE, TIBC, PSAT, FERR in the last 72 hours. No results found for: FOL, RBCF   No results for input(s): PH, PCO2, PO2 in the last 72 hours. No results for input(s): CPK, CKNDX, TROIQ in the last 72 hours.     No lab exists for component: CPKMB  No results found for: CHOL, CHOLX, CHLST, CHOLV, HDL, LDL, LDLC, DLDLP, TGLX, TRIGL, TRIGP, [facility], [facility]  Lab Results   Component Value Date/Time    Glucose (POC) 156 10/20/2017 12:04 PM    Glucose (POC) 249 10/20/2017 03:16 AM    Glucose (POC) 220 10/19/2017 10:10 PM    Glucose (POC) 135 10/19/2017 03:56 PM    Glucose (POC) 166 10/19/2017 09:35 AM     Lab Results   Component Value Date/Time    Color YELLOW/STRAW 10/15/2017 11:02 PM    Appearance CLOUDY 10/15/2017 11:02 PM    Specific gravity 1.019 10/15/2017 11:02 PM    Specific gravity 1.020 07/07/2009 07:20 PM    pH (UA) 5.5 10/15/2017 11:02 PM    Protein TRACE 10/15/2017 11:02 PM    Glucose NEGATIVE  10/15/2017 11:02 PM    Ketone NEGATIVE  10/15/2017 11:02 PM    Bilirubin NEGATIVE  10/15/2017 11:02 PM    Urobilinogen 0.2 10/15/2017 11:02 PM    Nitrites NEGATIVE  10/15/2017 11:02 PM    Leukocyte Esterase MODERATE 10/15/2017 11:02 PM    Epithelial cells FEW 10/15/2017 11:02 PM    Bacteria NEGATIVE  10/15/2017 11:02 PM    WBC 5-10 10/15/2017 11:02 PM    RBC 5-10 10/15/2017 11:02 PM         Medications Reviewed:     Current Facility-Administered Medications   Medication Dose Route Frequency    HYDROmorphone (PF) (DILAUDID) injection 1 mg  1 mg IntraVENous Q4H PRN    sodium chloride (NS) flush 20 mL  20 mL InterCATHeter PRN    sodium chloride (NS) flush 10 mL  10 mL InterCATHeter Q24H    sodium chloride (NS) flush 10 mL  10 mL InterCATHeter PRN    sodium chloride (NS) flush 10 mL  10 mL InterCATHeter Q8H    alteplase (CATHFLO) 1 mg in sterile water (preservative free) 1 mL injection  1 mg InterCATHeter PRN    bacitracin 500 unit/gram packet 1 Packet  1 Packet Topical PRN    predniSONE (DELTASONE) tablet 40 mg  40 mg Oral DAILY WITH BREAKFAST    TPN ADULT - CENTRAL   IntraVENous CONTINUOUS    enoxaparin (LOVENOX) injection 40 mg  40 mg SubCUTAneous Q24H    morphine CR (MS CONTIN) tablet 60 mg  60 mg Oral BID    morphine CR (MS CONTIN) tablet 100 mg  100 mg Oral QHS    morphine IR (MS IR) tablet 30 mg  30 mg Oral Q3H PRN    acetaminophen (TYLENOL) tablet 650 mg  650 mg Oral Q6H PRN    0.9% sodium chloride infusion  75 mL/hr IntraVENous CONTINUOUS    glucose chewable tablet 16 g  4 Tab Oral PRN    dextrose (D50W) injection syrg 12.5-25 g  12.5-25 g IntraVENous PRN    glucagon (GLUCAGEN) injection 1 mg  1 mg IntraMUSCular PRN    insulin lispro (HUMALOG) injection   SubCUTAneous Q6H    fat emulsion 20% (LIPOSYN, INTRAlipid) infusion 500 mL  500 mL IntraVENous Q MON, WED & FRI    clonazePAM (KlonoPIN) tablet 0.5 mg  0.5 mg Oral BID    sodium chloride (NS) flush 5-10 mL  5-10 mL IntraVENous Q8H    sodium chloride (NS) flush 5-10 mL  5-10 mL IntraVENous PRN    HYDROmorphone (PF) 15 mg/30 ml (DILAUDID) PCA   IntraVENous TITRATE    sodium chloride (NS) flush 5-10 mL  5-10 mL IntraVENous PRN    piperacillin-tazobactam (ZOSYN) 3.375 g in 0.9% sodium chloride (MBP/ADV) 100 mL  3.375 g IntraVENous Q8H    famotidine (PF) (PEPCID) 20 mg in sodium chloride 0.9 % 10 mL injection  20 mg IntraVENous Q12H    ondansetron (ZOFRAN) injection 4 mg  4 mg IntraVENous Q6H    sodium chloride (NS) flush 5-10 mL  5-10 mL IntraVENous PRN     ______________________________________________________________________  EXPECTED LENGTH OF STAY: 3d 14h  ACTUAL LENGTH OF STAY:          4                 Mitzie Boeck, MD

## 2017-10-20 NOTE — WOUND CARE
Wound Consult: Follow Up Visit. Chart reviewed. Consulted for abdominal fistula. Spoke with patients nurse,  Adri Hayes and in with patient, her mom and fellow wound care nurse Summer Castellanos to re-pouch fistula. Patient is resting on a total care bariatric plus bed. Pain medication given prior to removing appliance. Assessment:  1. Midline abdominal fistula - moist red wound bed with fistula from left side beneath skin with clear, watery enteric output. No surrounding redness. 2. LLQ colostomy - moist flush red stoma, some murky greenish/grey drainage. No redness surrounding. Treatment:  Large fistula appliance placed; used eakins seal, stoma paste to help caulk, even out folds surrounding site; stoma powder and Marathon to skin prior to placing. New colostomy pouch placed. Wound Recommendations:  Pouch fistula to contain effluent - place to low wall suction. Supplies at bedside for pouching; unit stocks additional Marathon; mom and patient are very versed in care and can help talk nursing through when placing appliance. Skin Care Recommendations:  1. Minimize friction/shear: minimize layers of linen/pads under patient. 2. Off load pressure/reposition:assist patient in routine repositioning. 3. Manage Moisture - keep skin folds dry; incontinence skin care as needed; using female urinal.  4. Continue to monitor nutrition, pain, and skin risk scale, and skin assessment. Plan: We will continue to reassess routinely and as needed.   Melanie Gurrola, [facility] Office [phone]  Pager [phone]

## 2017-10-20 NOTE — PROGRESS NOTES
Note entered on 10/20/17:    Spiritual Care Partner Volunteer visited patient in [facility] on 10/19/17. Documented by:  Rev. Luis Angel Hinson M.Div, [facility]

## 2017-10-20 NOTE — DIABETES MGMT
DTC Progress Note    Recommendations/ Comments: Chart reviewed due to hyperglycemia likely due to steroids. Noted steroids discontinued this morning. If pt BG remains >180mg/dL may consider changing correction scale to insulin resistant. Chart reviewed on Miriam Guzman. Patient is a 46 y.o. female with no documented history of diabetes per H&P. A1c:   No results found for: HBA1C, HGBE8, XWW5ZDNB, HJR9CPRW    Recent Glucose Results:   Lab Results   Component Value Date/Time     (H) 10/20/2017 03:37 AM    GLUCPOC 156 (H) 10/20/2017 12:04 PM    GLUCPOC 249 (H) 10/20/2017 03:16 AM    GLUCPOC 220 (H) 10/19/2017 10:10 PM        Lab Results   Component Value Date/Time    Creatinine 1.01 10/20/2017 03:37 AM     Estimated Creatinine Clearance: 105.5 mL/min (based on Cr of 1.01). Active Orders   Diet    DIET CLEAR LIQUID        PO intake:   Patient Vitals for the past 72 hrs:   % Diet Eaten   10/19/17 1034 20 %       Current hospital DM medication: correction scale Humalog, normal sensitivity. Will continue to follow as needed.     Thank you    Deandre Loyd, RD, [address]

## 2017-10-20 NOTE — PROGRESS NOTES
Bedside shift change report given to Tenzin Arguelles RN (oncoming nurse) by Atul Pablo RN (offgoing nurse). Report included the following information SBAR and Kardex.

## 2017-10-20 NOTE — PROGRESS NOTES
[address]  [address], [address]       GI PROGRESS NOTE  Will Nat Munford  [phone] office  [phone] NP/PA in-hospital cell phone M-F until 4:30PM  After 5PM or on weekends, please call  for physician on call      NAME: Miriam Guzman   :  1975   MRN:  [id number]       Subjective:   Patient appears more comfortable. Pain is better managed today. No nausea or vomiting. She has been tolerating clear liquids. Objective:     VITALS:   Last 24hrs VS reviewed since prior progress note. Most recent are:  Visit Vitals    /83 (BP 1 Location: Left arm)    Pulse 80    Temp 97.6 °F (36.4 °C)    Resp 16    Ht 5' 4\" (1.626 m)    Wt 141.4 kg (311 lb 11.7 oz)    SpO2 99%    BMI 53.51 kg/m2       PHYSICAL EXAM:  General: Cooperative, no acute distress    Neurologic:  Alert and oriented X 3. HEENT: EOMI, no scleral icterus   Lungs:  CTA bilaterally  Heart:  S1 S2, regular rhythm, no murmur   Abdomen: Obese, non-distended, mild diffuse tenderness to palpation. Active bowel sounds. Fistula with new dressing in place. LLQ ostomy with no output. Extremities: No edema  Psych:   Good insight. Not anxious.      Lab Data Reviewed:     Recent Results (from the past 24 hour(s))   GLUCOSE, POC    Collection Time: 10/19/17  3:56 PM   Result Value Ref Range    Glucose (POC) 135 (H) 65 - 100 mg/dL    Performed by Lisa Philippe    GLUCOSE, POC    Collection Time: 10/19/17 10:10 PM   Result Value Ref Range    Glucose (POC) 220 (H) 65 - 100 mg/dL    Performed by [address] [place] [address], POC    Collection Time: 10/20/17  3:16 AM   Result Value Ref Range    Glucose (POC) 249 (H) 65 - 100 mg/dL    Performed by Mita Costa    CBC W/O DIFF    Collection Time: 10/20/17  3:37 AM   Result Value Ref Range    WBC 21.3 (H) 3.6 - 11.0 K/uL    RBC 3.60 (L) 3.80 - 5.20 M/uL    HGB 9.9 (L) 11.5 - 16.0 g/dL    HCT 31.2 (L) 35.0 - 47.0 %    MCV 86.7 80.0 - 99.0 FL    MCH 27.5 26.0 - 34.0 PG    MCHC 31.7 30.0 - 36.5 g/dL    RDW 17.2 (H) 11.5 - 14.5 %    PLATELET 502 863 - 098 K/uL   METABOLIC PANEL, BASIC    Collection Time: 10/20/17  3:37 AM   Result Value Ref Range    Sodium 136 136 - 145 mmol/L    Potassium 4.2 3.5 - 5.1 mmol/L    Chloride 109 (H) 97 - 108 mmol/L    CO2 20 (L) 21 - 32 mmol/L    Anion gap 7 5 - 15 mmol/L    Glucose 262 (H) 65 - 100 mg/dL    BUN 33 (H) 6 - 20 MG/DL    Creatinine 1.01 0.55 - 1.02 MG/DL    BUN/Creatinine ratio 33 (H) 12 - 20      GFR est AA >60 >60 ml/min/1.73m2    GFR est non-AA >60 >60 ml/min/1.73m2    Calcium 8.8 8.5 - 10.1 MG/DL   MAGNESIUM    Collection Time: 10/20/17  3:37 AM   Result Value Ref Range    Magnesium 1.8 1.6 - 2.4 mg/dL   GLUCOSE, POC    Collection Time: 10/20/17 12:04 PM   Result Value Ref Range    Glucose (POC) 156 (H) 65 - 100 mg/dL    Performed by Jose Watson         Assessment:   · Crohn's Disease: multiple abdominal surgeries; EC fistula    · Acute on chronic abdominal pain: Surgery evaluated with no immediate surgical plans for fistula. Tentative plans to repair fistula in January. Patient Active Problem List   Diagnosis Code    Crohn's disease ([facility] K50.90    DVT (deep venous thrombosis) (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) R65.10    DEAN (acute kidney injury) ([facility] N17.9     Plan:   · Continue TPN  · Continue IV antibiotics   · Pain management per palliative care  · Transition to PO steroids today     Signed By: АНДРЕЙ Woodson     10/20/2017  2:54 PM         GI Attending: Agree with above. Patient seen and examined. Plan as above. Weekend team to follow. Cain Can MD

## 2017-10-20 NOTE — PROGRESS NOTES
Bedside shift change report given to MAURI Cortez  (oncoming nurse) by Wojciech Fischer RN  (offgoing nurse). Report included the following information SBAR and MAR.

## 2017-10-20 NOTE — CONSULTS
Palliative Medicine Consult  [place]: [phone] ([phone])    Patient Name: Rafita Keller  YOB: 1975    Date of Initial Consult: 10/16/17  Reason for Consult: pain management  Requesting Provider: Dr. Chapo Becerra  Primary Care Physician: Eliezer Vasques MD      SUMMARY:   Rafita Keller is a 46 y.o. with a past history of Crohn's disease, multiple surgeries s/p total colectomy with end ileostomy, 6/7/17 exp lap w SB perf, 6/28/17 exp lap, lysis of adhesions, repair of enterotomy, SMA stent on L, 7/16/17 exp lap, lysis of adhesions, fistula exclusion with feeding tube placement, frozen abdomen/ wound vac assisted closure placement, prolonged TPN, MRSA wound infection, awaiting repair of EC fistula planned for Jan 2018 if stable for surgery (an none of her home Crohn's meds until after healed) who was admitted on 10/15/2017 from [facility] with a diagnosis of acute on chronic abdominal pain. CT abd/pelvis done. Thought to be from Crohn's flare and GI started on steroids. Has not been on her home Crohn's medication since earlier this year due to recurrent infections. Current medical issues leading to Palliative Medicine involvement include: pain management. Patient's mother, Tootie King is NOK   PALLIATIVE DIAGNOSES:   1. Acute on chronic abdominal pain ? Reason for increased pain. 2. Crohn's disease ? Flare  3. Chronic anxiety and depression, chronic benzo use  4. Debility, generalized muscle wasting  5. Nausea- multifactorial       PLAN:   1. Pain - unclear etiology to change in pain. Patient and mom report that pain meds had not recently been changed at [facility]. Thought to be Crohn's flare, GI started on steroids. Had tried Fentanyl patch 50mcg at [facility] w/out benefit, so stopped but we are concerned that she is absorbing the MSContin poorly. However did not feel the Fentanyl 100mcg patch and wonders if something similar to her home regimen would be better.    1.  MSContin 60mg bid and 100mg bedtime (pain worse at night). 2. Change to Morphine IR tablets 30mg every 3h prn. Pls try to use this before the IV Dilaudid (which I will continue) and do not give within 30 min of each other. I encouraged patient to take this regularly today. 3. Continue Dilaudid PCA NO basal rate, with 0.4mg Q8min   (she used 6.8mg in past 8h)  4. Change - Dilaudid 1mg IV every 4hr PRN (instead of Q3)  pain not controlled by PCA. Patient is resistant to this change, but we discussed need to wean off IV meds and rely on oral meds. 5. Pt sees Dr Patrica Montes ([facility]) for chronic pain management. 6. Aware that we NEED to wean these IV meds so she can go home. Very tolerant now. Hopeful steroids will help. 7. Plan reviewed with patient, her mom, and bedside nurse. 2. Nausea  1. Compazine 5mg IV Q8hrs scheduled  2. zofran 4mg IV Q6hrs scheduled  3. Anxiety - patient chronically taking klonopin twice daily. 4. Continue Klonipin now at 0.5mg bid.   5. Communicated plan of care with: Palliative IDT; Dr Layne Cooks ; Dr Endy Lugo / TREATMENT PREFERENCES:   [====Goals of Care====]  GOALS OF CARE:  Patient / health care proxy stated goals: (pain management)  Goals not reviewed today        TREATMENT PREFERENCES:   Code Status: Full Code    Advance Care Planning:  Advance Care Planning 10/16/2017   Patient's Healthcare Decision Maker is: Legal Next of Camilo 71   Primary Decision Maker Name Minna Bolden   Primary Decision Maker Phone Number [phone]   Primary Decision Maker Relationship to Patient Parent   Confirm Advance Directive None   Patient Would Like to Complete Advance Directive -       Other:    The palliative care team has discussed with patient / health care proxy about goals of care / treatment preferences for patient.  [====Goals of Care====]         HISTORY:     HPI/SUBJECTIVE:    The patient is:   [x] Verbal and participatory  [] Non-participatory due to:   46year old female who is known to our service from previous admission. She reports one day of acute change in abdominal pain. She had been doing \"ok\" and was planning on seeing her pain management doctor on Wednesday of this week for recommendations about weaning. She doesn't think [facility] had changed anything recently with her pain meds. She was on 60 Q8 of MS contin, morphine PCA (1mg for a while, most recently 0.5mg PCA dose) and PRN dilaudid 1mg IV . At one point they tried getting her off the PCA, but she didn't tolerate that well because it was too many pills for her to digest, so PCA had been restarted. 10/19 Still does not feel Fentanyl patch at 100mcg working. Tried 6 doses of the Morphine liquid 20mg prn and did not help. Still using Dilaudid 1mg IV every 3h prn around the clock. No sedation or confusion. 10/18 Pt does not feel Fentanyl patch working. Hopeful to go home soon and realizes that she cannot go home on PCA. I think might do well w/ liquid morphine compared to her previous po home regimen. 10/17  The night was difficult, woke up with pain -- still needing IV dilaudid frequently. PCA use 17 doses in past 12 hours. (0.4mg each)  She used 1mg IV Dilaudid PRN about every 3-4 hours  Nausea is better but still a not good, wants some ginger ale. 10/20  Still having bad pain at night. Not using PCA that much \"forgets it's there\" prefers the IV push, can feel effect of that better. Tearful, discouraged, frustrated. Clinical Pain Assessment (nonverbal scale for severity on nonverbal patients):   [++++ Clinical Pain Assessment++++]  [++++Pain Severity++++]: Pain: 6  [++++Pain Character++++]: stabbing  [++++Pain Duration++++]:   Bad for the last 24-48 hours  [++++Pain Effect++++]: nauseated  [++++Pain Factors++++]:   [++++Pain Frequency++++]: constant  [++++Pain Location++++]: all over abdomen  [++++ Clinical Pain Assessment++++]  Duration: for how long has pt been experiencing pain (e.g., 2 days, 1 month, years)  Frequency: how often pain is an issue (e.g., several times per day, once every few days, constant)     FUNCTIONAL ASSESSMENT:     Palliative Performance Scale (PPS):  PPS: 40       PSYCHOSOCIAL/SPIRITUAL SCREENING:     Advance Care Planning:  Advance Care Planning 10/16/2017   Patient's Healthcare Decision Maker is: Legal Next of Camilo Jiménez   Primary Decision Maker Name Albania Lim   Primary Decision Maker Phone Number [phone]   Primary Decision Maker Relationship to Patient Parent   Confirm Advance Directive None   Patient Would Like to Complete Advance Directive -        Any spiritual / Orthodoxy concerns:  [] Yes /  [x] No    Caregiver Burnout:  [] Yes /  [x] No /  [] No Caregiver Present      Anticipatory grief assessment:   [x] Normal  / [] Maladaptive       ESAS Anxiety: Anxiety: 5    ESAS Depression: Depression: 5        REVIEW OF SYSTEMS:     Positive and pertinent negative findings in ROS are noted above in HPI. The following systems were [x] reviewed / [] unable to be reviewed as noted in HPI  Other findings are noted below. Systems: constitutional, ears/nose/mouth/throat, respiratory, gastrointestinal, genitourinary, musculoskeletal, integumentary, neurologic, psychiatric, endocrine. Positive findings noted below. Modified ESAS Completed by: provider   Fatigue: 6 Drowsiness: 0   Depression: 5 Pain: 6   Anxiety: 5 Nausea: 8   Anorexia: 8 Dyspnea: 0     Constipation: No              PHYSICAL EXAM:     From RN flowsheet:  Wt Readings from Last 3 Encounters:   10/20/17 311 lb 11.7 oz (141.4 kg)   10/03/17 338 lb (153.3 kg)   09/12/17 338 lb (153.3 kg)     Blood pressure 143/83, pulse 80, temperature 97.6 °F (36.4 °C), resp. rate 16, height 5' 4\" (1.626 m), weight 311 lb 11.7 oz (141.4 kg), SpO2 99 %.     Pain Scale 1: Numeric (0 - 10)  Pain Intensity 1: 9  Pain Onset 1: chronic  Pain Location 1: Abdomen  Pain Orientation 1: Mid, Left, Right  Pain Description 1: Sharp, Stabbing  Pain Intervention(s) 1: Medication (see MAR)  Last bowel movement, if known: today     Constitutional: pt is awake, alert, chronically ill appearing, anxious   Eyes: Clear  ENT: moist mucous membranes, nares patent  Chest: Regular rhythm   Resp: CTA, unlabored  Abd: Mult wounds w/ leakage from EC fistula. +BS. TTP. Msk: No abnormalities  Neuro: moving all extremities   Psych: anxious            HISTORY:     Principal Problem:    SIRS (systemic inflammatory response syndrome) (HCC) (10/16/2017)    Active Problems:    Abdominal pain (6/25/2017)      Acute hyponatremia (10/16/2017)      Leukocytosis (10/16/2017)      Wound, open, anterior abdominal wall (10/16/2017)      DEAN (acute kidney injury) ([facility] [address] (10/16/2017)      Past Medical History:   Diagnosis Date    Crohn's disease ([facility] [address] 8/15/2011    DVT (deep venous thrombosis) ([facility] [address] 8/15/2011    Incarcerated ventral hernia 8/15/2011    Right flank pain 8/22/2011    Seizures ([facility] [address]     Stroke [facility])       Past Surgical History:   Procedure Laterality Date    HX OTHER SURGICAL      multiple procedures related to her Crohn's disease    [place] LAP, INCISIONAL HERNIA REPAIR,INCARCERATED  9-10-08    dr. Jalen Luque      Family History   Problem Relation Age of Onset    Hypertension Father     Stroke Father     Other Father      arthritis      History reviewed, no pertinent family history.   Social History   Substance Use Topics    Smoking status: Former Smoker    Smokeless tobacco: Not on file    Alcohol use No     Allergies   Allergen Reactions    Demerol [Meperidine] Unknown (comments)    Soma [Carisoprodol] Rash and Nausea Only    Sulfa (Sulfonamide Antibiotics) Unknown (comments)    Toradol [Ketorolac Tromethamine] Unknown (comments)      Current Facility-Administered Medications   Medication Dose Route Frequency    HYDROmorphone (PF) (DILAUDID) injection 1 mg  1 mg IntraVENous Q4H PRN    enoxaparin (LOVENOX) injection 40 mg  40 mg SubCUTAneous Q24H    morphine CR (MS CONTIN) tablet 60 mg  60 mg Oral BID    morphine CR (MS CONTIN) tablet 100 mg  100 mg Oral QHS    morphine IR (MS IR) tablet 30 mg  30 mg Oral Q3H PRN    acetaminophen (TYLENOL) tablet 650 mg  650 mg Oral Q6H PRN    0.9% sodium chloride infusion  75 mL/hr IntraVENous CONTINUOUS    glucose chewable tablet 16 g  4 Tab Oral PRN    dextrose (D50W) injection syrg 12.5-25 g  12.5-25 g IntraVENous PRN    glucagon (GLUCAGEN) injection 1 mg  1 mg IntraMUSCular PRN    insulin lispro (HUMALOG) injection   SubCUTAneous Q6H    fat emulsion 20% (LIPOSYN, INTRAlipid) infusion 500 mL  500 mL IntraVENous Q MON, WED & FRI    clonazePAM (KlonoPIN) tablet 0.5 mg  0.5 mg Oral BID    methylPREDNISolone (PF) (SOLU-MEDROL) injection 20 mg  20 mg IntraVENous Q6H    sodium chloride (NS) flush 5-10 mL  5-10 mL IntraVENous Q8H    sodium chloride (NS) flush 5-10 mL  5-10 mL IntraVENous PRN    HYDROmorphone (PF) 15 mg/30 ml (DILAUDID) PCA   IntraVENous TITRATE    sodium chloride (NS) flush 5-10 mL  5-10 mL IntraVENous PRN    piperacillin-tazobactam (ZOSYN) 3.375 g in 0.9% sodium chloride (MBP/ADV) 100 mL  3.375 g IntraVENous Q8H    famotidine (PF) (PEPCID) 20 mg in sodium chloride 0.9 % 10 mL injection  20 mg IntraVENous Q12H    ondansetron (ZOFRAN) injection 4 mg  4 mg IntraVENous Q6H    sodium chloride (NS) flush 5-10 mL  5-10 mL IntraVENous PRN          LAB AND IMAGING FINDINGS:     Lab Results   Component Value Date/Time    WBC 21.3 10/20/2017 03:37 AM    HGB 9.9 10/20/2017 03:37 AM    PLATELET 518 76/62/0006 03:37 AM     Lab Results   Component Value Date/Time    Sodium 136 10/20/2017 03:37 AM    Potassium 4.2 10/20/2017 03:37 AM    Chloride 109 10/20/2017 03:37 AM    CO2 20 10/20/2017 03:37 AM    BUN 33 10/20/2017 03:37 AM    Creatinine 1.01 10/20/2017 03:37 AM    Calcium 8.8 10/20/2017 03:37 AM    Magnesium 1.8 10/20/2017 03:37 AM    Phosphorus 4.5 10/19/2017 12:38 AM      Lab Results   Component Value Date/Time    AST (SGOT) 23 10/18/2017 05:21 AM    Alk. phosphatase 199 10/18/2017 05:21 AM    Protein, total 7.3 10/18/2017 05:21 AM    Albumin 2.4 10/18/2017 05:21 AM    Globulin 4.9 10/18/2017 05:21 AM     No results found for: INR, PTMR, PTP, PT1, PT2, APTT   No results found for: IRON, FE, TIBC, IBCT, PSAT, FERR   No results found for: PH, PCO2, PO2  No components found for: GLPOC   No results found for: CPK, CKMB             Total time:   Counseling / coordination time, spent as noted above:   > 50% counseling / coordination?:     Prolonged service was provided for  []30 min   []75 min in face to face time in the presence of the patient, spent as noted above. Time Start:   Time End:   Note: this can only be billed with 85293 (initial) or 78838 (follow up). If multiple start / stop times, list each separately.

## 2017-10-21 LAB
ANION GAP SERPL CALC-SCNC: 7 MMOL/L (ref 5–15)
BUN SERPL-MCNC: 30 MG/DL (ref 6–20)
BUN/CREAT SERPL: 34 (ref 12–20)
CALCIUM SERPL-MCNC: 9.3 MG/DL (ref 8.5–10.1)
CHLORIDE SERPL-SCNC: 111 MMOL/L (ref 97–108)
CO2 SERPL-SCNC: 20 MMOL/L (ref 21–32)
CREAT SERPL-MCNC: 0.87 MG/DL (ref 0.55–1.02)
GLUCOSE BLD STRIP.AUTO-MCNC: 114 MG/DL (ref 65–100)
GLUCOSE BLD STRIP.AUTO-MCNC: 145 MG/DL (ref 65–100)
GLUCOSE BLD STRIP.AUTO-MCNC: 167 MG/DL (ref 65–100)
GLUCOSE BLD STRIP.AUTO-MCNC: 170 MG/DL (ref 65–100)
GLUCOSE BLD STRIP.AUTO-MCNC: 178 MG/DL (ref 65–100)
GLUCOSE BLD STRIP.AUTO-MCNC: 186 MG/DL (ref 65–100)
GLUCOSE BLD STRIP.AUTO-MCNC: 190 MG/DL (ref 65–100)
GLUCOSE SERPL-MCNC: 202 MG/DL (ref 65–100)
MAGNESIUM SERPL-MCNC: 1.7 MG/DL (ref 1.6–2.4)
POTASSIUM SERPL-SCNC: 3.6 MMOL/L (ref 3.5–5.1)
SERVICE CMNT-IMP: ABNORMAL
SODIUM SERPL-SCNC: 138 MMOL/L (ref 136–145)

## 2017-10-21 PROCEDURE — 74011250636 HC RX REV CODE- 250/636: Performed by: INTERNAL MEDICINE

## 2017-10-21 PROCEDURE — 74011250636 HC RX REV CODE- 250/636: Performed by: HOSPITALIST

## 2017-10-21 PROCEDURE — 74011000250 HC RX REV CODE- 250: Performed by: HOSPITALIST

## 2017-10-21 PROCEDURE — 74011250637 HC RX REV CODE- 250/637: Performed by: HOSPITALIST

## 2017-10-21 PROCEDURE — 74011636637 HC RX REV CODE- 636/637: Performed by: INTERNAL MEDICINE

## 2017-10-21 PROCEDURE — 36415 COLL VENOUS BLD VENIPUNCTURE: CPT | Performed by: HOSPITALIST

## 2017-10-21 PROCEDURE — 74011000258 HC RX REV CODE- 258: Performed by: HOSPITALIST

## 2017-10-21 PROCEDURE — 83735 ASSAY OF MAGNESIUM: CPT | Performed by: HOSPITALIST

## 2017-10-21 PROCEDURE — 74011000250 HC RX REV CODE- 250: Performed by: NURSE PRACTITIONER

## 2017-10-21 PROCEDURE — 74011250637 HC RX REV CODE- 250/637: Performed by: INTERNAL MEDICINE

## 2017-10-21 PROCEDURE — 74011636637 HC RX REV CODE- 636/637: Performed by: HOSPITALIST

## 2017-10-21 PROCEDURE — 80048 BASIC METABOLIC PNL TOTAL CA: CPT | Performed by: HOSPITALIST

## 2017-10-21 PROCEDURE — 74011250637 HC RX REV CODE- 250/637: Performed by: PHYSICAL MEDICINE & REHABILITATION

## 2017-10-21 PROCEDURE — 65270000029 HC RM PRIVATE

## 2017-10-21 RX ORDER — PREDNISONE 10 MG/1
20 TABLET ORAL 2 TIMES DAILY WITH MEALS
Status: DISCONTINUED | OUTPATIENT
Start: 2017-10-21 | End: 2017-10-25

## 2017-10-21 RX ADMIN — FAMOTIDINE 20 MG: 10 INJECTION, SOLUTION INTRAVENOUS at 09:25

## 2017-10-21 RX ADMIN — HYDROMORPHONE HYDROCHLORIDE 1 MG: 1 INJECTION, SOLUTION INTRAMUSCULAR; INTRAVENOUS; SUBCUTANEOUS at 08:33

## 2017-10-21 RX ADMIN — HYDROMORPHONE HYDROCHLORIDE 1 MG: 1 INJECTION, SOLUTION INTRAMUSCULAR; INTRAVENOUS; SUBCUTANEOUS at 00:28

## 2017-10-21 RX ADMIN — INSULIN LISPRO 2 UNITS: 100 INJECTION, SOLUTION INTRAVENOUS; SUBCUTANEOUS at 06:07

## 2017-10-21 RX ADMIN — ONDANSETRON 4 MG: 2 INJECTION INTRAMUSCULAR; INTRAVENOUS at 00:28

## 2017-10-21 RX ADMIN — HYDROMORPHONE HYDROCHLORIDE 1 MG: 1 INJECTION, SOLUTION INTRAMUSCULAR; INTRAVENOUS; SUBCUTANEOUS at 12:27

## 2017-10-21 RX ADMIN — MORPHINE SULFATE 60 MG: 60 TABLET, FILM COATED, EXTENDED RELEASE ORAL at 09:25

## 2017-10-21 RX ADMIN — MORPHINE SULFATE 30 MG: 15 TABLET ORAL at 11:32

## 2017-10-21 RX ADMIN — ONDANSETRON 4 MG: 2 INJECTION INTRAMUSCULAR; INTRAVENOUS at 18:20

## 2017-10-21 RX ADMIN — Medication 10 ML: at 12:28

## 2017-10-21 RX ADMIN — MORPHINE SULFATE 100 MG: 100 TABLET, EXTENDED RELEASE ORAL at 21:56

## 2017-10-21 RX ADMIN — Medication 10 ML: at 21:56

## 2017-10-21 RX ADMIN — Medication 10 ML: at 06:10

## 2017-10-21 RX ADMIN — ACETAMINOPHEN 650 MG: 325 TABLET ORAL at 20:15

## 2017-10-21 RX ADMIN — INSULIN LISPRO 2 UNITS: 100 INJECTION, SOLUTION INTRAVENOUS; SUBCUTANEOUS at 00:00

## 2017-10-21 RX ADMIN — FAMOTIDINE 20 MG: 10 INJECTION, SOLUTION INTRAVENOUS at 20:49

## 2017-10-21 RX ADMIN — Medication 10 ML: at 09:27

## 2017-10-21 RX ADMIN — SODIUM CHLORIDE 75 ML/HR: 900 INJECTION, SOLUTION INTRAVENOUS at 06:07

## 2017-10-21 RX ADMIN — HYDROMORPHONE HYDROCHLORIDE 1 MG: 1 INJECTION, SOLUTION INTRAMUSCULAR; INTRAVENOUS; SUBCUTANEOUS at 16:25

## 2017-10-21 RX ADMIN — Medication: at 04:58

## 2017-10-21 RX ADMIN — HYDROMORPHONE HYDROCHLORIDE 1 MG: 1 INJECTION, SOLUTION INTRAMUSCULAR; INTRAVENOUS; SUBCUTANEOUS at 20:49

## 2017-10-21 RX ADMIN — INSULIN LISPRO 2 UNITS: 100 INJECTION, SOLUTION INTRAVENOUS; SUBCUTANEOUS at 18:20

## 2017-10-21 RX ADMIN — ENOXAPARIN SODIUM 40 MG: 40 INJECTION SUBCUTANEOUS at 12:27

## 2017-10-21 RX ADMIN — HYDROMORPHONE HYDROCHLORIDE 1 MG: 1 INJECTION, SOLUTION INTRAMUSCULAR; INTRAVENOUS; SUBCUTANEOUS at 05:03

## 2017-10-21 RX ADMIN — Medication: at 18:20

## 2017-10-21 RX ADMIN — MORPHINE SULFATE 30 MG: 15 TABLET ORAL at 14:36

## 2017-10-21 RX ADMIN — PREDNISONE 40 MG: 10 TABLET ORAL at 07:53

## 2017-10-21 RX ADMIN — MORPHINE SULFATE 60 MG: 60 TABLET, FILM COATED, EXTENDED RELEASE ORAL at 18:20

## 2017-10-21 RX ADMIN — MORPHINE SULFATE 30 MG: 15 TABLET ORAL at 17:41

## 2017-10-21 RX ADMIN — CLONAZEPAM 0.5 MG: 0.5 TABLET ORAL at 20:49

## 2017-10-21 RX ADMIN — ONDANSETRON 4 MG: 2 INJECTION INTRAMUSCULAR; INTRAVENOUS at 12:27

## 2017-10-21 RX ADMIN — ONDANSETRON 4 MG: 2 INJECTION INTRAMUSCULAR; INTRAVENOUS at 06:07

## 2017-10-21 RX ADMIN — MORPHINE SULFATE 30 MG: 15 TABLET ORAL at 03:43

## 2017-10-21 RX ADMIN — Medication 10 ML: at 16:25

## 2017-10-21 RX ADMIN — MORPHINE SULFATE 30 MG: 15 TABLET ORAL at 07:53

## 2017-10-21 RX ADMIN — MORPHINE SULFATE 30 MG: 15 TABLET ORAL at 21:56

## 2017-10-21 RX ADMIN — MORPHINE SULFATE 30 MG: 15 TABLET ORAL at 00:57

## 2017-10-21 RX ADMIN — ASCORBIC ACID, VITAMIN A PALMITATE, CHOLECALCIFEROL, THIAMINE HYDROCHLORIDE, RIBOFLAVIN-5 PHOSPHATE SODIUM, PYRIDOXINE HYDROCHLORIDE, NIACINAMIDE, DEXPANTHENOL, ALPHA-TOCOPHEROL ACETATE, VITAMIN K1, FOLIC ACID, BIOTIN, CYANOCOBALAMIN: 200; 3300; 200; 6; 3.6; 6; 40; 15; 10; 150; 600; 60; 5 INJECTION, SOLUTION INTRAVENOUS at 18:49

## 2017-10-21 RX ADMIN — PREDNISONE 20 MG: 10 TABLET ORAL at 17:15

## 2017-10-21 NOTE — PROGRESS NOTES
Problem: Falls - Risk of  Goal: *Absence of Falls  Document Marlene Fall Risk and appropriate interventions in the flowsheet.    Outcome: Progressing Towards Goal  Fall Risk Interventions:  Mobility Interventions: Communicate number of staff needed for ambulation/transfer         Medication Interventions: Patient to call before getting OOB    Elimination Interventions: Call light in reach, Patient to call for help with toileting needs, Urinal in reach, Toileting schedule/hourly rounds

## 2017-10-21 NOTE — PROGRESS NOTES
Hospitalist Progress Note                                            Date of Service:  10/21/2017  NAME:  Danika Mahmodo  :  1975  MRN:  [id number]      Admission Summary:   Per H&P \"The patient is a 29-year-old white   female with past medical history of Crohn disease, DVT, incarcerated   ventral hernia, chronic abdominal pain, morbid obesity, seizures,   stroke and multiple repeat abdominal surgeries, presented to the   emergency department via EMS from  [facility] with chief   complaint of abdominal pain. The patient has chronic abdominal pain. She has been on Dilaudid at [facility]. She notably has been on a PCA   according to her mother's report. In addition, she takes additional   doses of Dilaudid intermittently. Tonight she complains of stabbing,   severe generalized abdominal pain mostly in epigastrium according to   initial reports, rated at 10/10,  aggravated with any motion and   movement, without specific alleviating factors. She complains   of nausea, vomiting, chills, cough productive of clear sputum and per   the ER soreness near her vagina. She has open abdominal wound,   enterocutaneous fistula, which drains into collection bag. The patient   notes there has been some leakage around the fistula. She also has a   ileostomy and her mother notes there has been some \"blood\" seen   within the ostomy bag. The patient's recent history has been   complicated over the last several months. She reportedly had   undergone capsule endoscopy. She was subsequently admitted on   2017 secondary to retained video capsule. She underwent single   balloon enteroscopy on 2017 with iatrogenic bowel perforation   during enteroscopy.  She reportedly notably underwent diagnostic   laparoscopy converted to open exploratory laparotomy with extensive   (7-hour) lysis of adhesions with intraoperative upper endoscopy   performed and physical findings of a sealed perforated jejunum. Postoperatively the patient was noted to have MRSA wound infection   treated with IV vancomycin. She was discharged home with oral   antibiotics. The patient returned to the emergency department on   06/25/2017 with abdominal pain. She went back to OR on 06/28/2017   with this diagnosis of ischemic bowel and underwent exploratory   laparotomy, extensive lysis of adhesions with repair of enterotomy and   SMA stent on the left. Per review of chart records patient developed a   fistula  which required daily wound care, started on TPN. She was   essentially bed bound during the hospitalization and has   required multiple drains, which were eventually removed and replaced   with a wound VAC. She was eventually discharged to [facility]   with morphine PCA pump, TPN as well as additional oral pain   medications with intractable pain. During hospitalization, the patient   was seen by general surgery, vascular surgery, infectious disease,   cardiology, pulmonary  and palliative care services. The patient has   continued to require long-term skilled nursing care. She was   reportedly  recently treated for a UTI. There are no complaints   of syncope, loss of consciousness, new onset numbness,   paresthesias, slurred speech, facial droop, visual disturbance,   headache, neck pain, back pain, chest pain although at present, she   complains of chronic chest wall pain with light palpation and   hypersensitivity of her skin. She has not complained dysuria or   hematuria, increased calf pain, swelling, edema, fever, chills or rash. On arrival in emergency department tonight, initial recorded vital signs   were blood pressure 122/43, heart rate 128, respiratory rate of 20, O2   saturation is 100% on room air.  Workup included CT abdomen and   pelvis without contrast which showed large anterior abdominal wall and   site of previous known enterocutaneous fistula with mild stranding   around anterior abdominal wound as well as abdominal mesentery,   nonspecific wall thickening and inflammatory appearance of the   duodenum with nonspecific wall thickening with inflammatory   appearance of the duodenum. Labs showed elevated WBC 23,500. The patient had elevated BUN 94, creatinine of 2.26 and GFR of 24. Per the ED the patient was given Dilaudid 2 mg IV x2 doses, Zofran 8   mg IV x1 dose, Zofran 3.375 grams IV, levofloxacin 750 mg IV,   vancomycin 2000 mg IV. Blood cultures have been sent. The patient is   now seen for admission to the hospitalist service for continued   evaluation and treatments. The patient has already been seen by   general surgeon in consultation\". Interval history / Subjective:   A bit better today; abd pain is improved. No nausea or emesis today. Tolerating clears. Doesn't want to reduce IV pain meds or PCA. Assessment & Plan:   Acute on chronic abdominal pain: (POA) the etiology is unclear. This may be a Crohn's flare vs worsening of chronic pain  -CT abdomen and pelvis showed arge anterior abdominal wound, and a site of previous/known enterocutaneous fistula though this is difficult to assess given lack of oral and IV contrastmaterial. Mild stranding around the anterior abdominal wound as well as in the abdominal mesentery. Nonspecific wall thickening/inflammatory appearance of the duodenum.  -Not clear what caused this acute worsening of the pain. Chron's flare possible.  -palliative care consulted, recs appreciated. Currently on Dilaudid PCA which is being weaned (was on this at [facility]), fentanyl patch changed to ER morphine, IR PO liquid morphine PRN, IV Dilaudid PRN. 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  Wound care team following. Systemic inflammatory response syndrome (POA)   -likely due to Crohn's, Intra-abdominal infection possible  -stopped levofloxacin 10/17, vancomycin stopped 10/18, Zosyn stopped (10/20)    Hyponatremia Improved with fluids; monitor     Hypokalemia, hypomagnesemia, hypophosphatemia: Resolved with adjusting TPN; monitor    Hypercalcemia: mild, likely due to TPN; improved after adjusting TPN    Hyperglycemia: due to TPN; increased insulin in TPN to 15 U/L with better glycemic control    DEAN pre-renal, resolved with IV fluids    Chronic pain syndrome: palliative care management appreciated    Morbid obesity    Diet: clears, TPN  Code status: full  DVT prophylaxis: sq Lovenox  Dispo: [facility] vs home, wants to go home    Hospital Problems  Date Reviewed: 10/16/2017          Codes Class Noted POA    Acute hyponatremia ICD-10-CM: E87.1  ICD-9-CM: 276.1  10/16/2017 Unknown        Leukocytosis ICD-10-CM: D72.829  ICD-9-CM: 288.60  10/16/2017 Unknown        Wound, open, anterior abdominal wall ICD-10-CM: S31.109A  ICD-9-CM: 879.2  10/16/2017 Unknown        * (Principal)SIRS (systemic inflammatory response syndrome) ([facility] ICD-10-CM: R65.10  ICD-9-CM: 995.90  10/16/2017 Unknown        DEAN (acute kidney injury) ([facility] ICD-10-CM: N17.9  ICD-9-CM: 584.9  10/16/2017 Unknown        Abdominal pain ICD-10-CM: R10.9  ICD-9-CM: 789.00  6/25/2017 Unknown                Review of Systems:   Pertinent items are noted in HPI. Physical Examination:      Last 24hrs VS reviewed since prior progress note.  Most recent are:  Visit Vitals    /73 (BP 1 Location: Left arm, BP Patient Position: At rest)    Pulse 100    Temp 97.7 °F (36.5 °C)    Resp 22    Ht 5' 4\" (1.626 m)    Wt 145.7 kg (321 lb 3.4 oz)    SpO2 100%    BMI 55.14 kg/m2           Constitutional:  Drowsy, NAD   ENT:  anicteric sclerae, pale conjunctiva, MMM       Resp:  CTA b/l, normal work of breathing   CV:  RRR, no MRG, no JVD, no peripheral edema    GI: soft, bag over fistula draining enteric contents, abd is obese otherwise   :          Musculoskeletal:  No edema, warm,    Neurologic:  EOMI, CN grossly intact, normal speech, moves all extremities              Intake/Output Summary (Last 24 hours) at 10/21/17 1600  Last data filed at 10/21/17 1238   Gross per 24 hour   Intake                0 ml   Output             3475 ml   Net            -3475 ml          Data Review:    Review and/or order of clinical lab test  Review and/or order of tests in the radiology section of CPT  Review and/or order of tests in the medicine section of CPT      Labs:     Recent Labs      10/20/17   0337   WBC  21.3*   HGB  9.9*   HCT  31.2*   PLT  280     Recent Labs      10/21/17   0411  10/20/17   0337  10/19/17   0038   NA  138  136  133*   K  3.6  4.2  4.9   CL  111*  109*  106   CO2  20*  20*  16*   BUN  30*  33*  33*   CREA  0.87  1.01  1.04*   GLU  202*  262*  452*   CA  9.3  8.8  9.2   MG  1.7  1.8  1.8   PHOS   --    --   4.5     No results for input(s): SGOT, GPT, ALT, AP, TBIL, TBILI, TP, ALB, GLOB, GGT, AML, LPSE in the last 72 hours. No lab exists for component: AMYP, HLPSE  No results for input(s): INR, PTP, APTT in the last 72 hours. No lab exists for component: INREXT, INREXT   No results for input(s): FE, TIBC, PSAT, FERR in the last 72 hours. No results found for: FOL, RBCF   No results for input(s): PH, PCO2, PO2 in the last 72 hours. No results for input(s): CPK, CKNDX, TROIQ in the last 72 hours.     No lab exists for component: CPKMB  No results found for: CHOL, CHOLX, CHLST, CHOLV, HDL, LDL, LDLC, DLDLP, TGLX, TRIGL, TRIGP, CHHD, CHHDX  Lab Results   Component Value Date/Time    Glucose (POC) 114 10/21/2017 11:51 AM    Glucose (POC) 170 10/21/2017 06:04 AM    Glucose (POC) 190 10/20/2017 11:56 PM    Glucose (POC) 143 10/20/2017 06:28 PM    Glucose (POC) 156 10/20/2017 12:04 PM     Lab Results   Component Value Date/Time    Color YELLOW/STRAW 10/15/2017 11:02 PM    Appearance CLOUDY 10/15/2017 11:02 PM    Specific gravity 1.019 10/15/2017 11:02 PM    Specific gravity 1.020 07/07/2009 07:20 PM    pH (UA) 5.5 10/15/2017 11:02 PM    Protein TRACE 10/15/2017 11:02 PM    Glucose NEGATIVE  10/15/2017 11:02 PM    Ketone NEGATIVE  10/15/2017 11:02 PM    Bilirubin NEGATIVE  10/15/2017 11:02 PM    Urobilinogen 0.2 10/15/2017 11:02 PM    Nitrites NEGATIVE  10/15/2017 11:02 PM    Leukocyte Esterase MODERATE 10/15/2017 11:02 PM    Epithelial cells FEW 10/15/2017 11:02 PM    Bacteria NEGATIVE  10/15/2017 11:02 PM    WBC 5-10 10/15/2017 11:02 PM    RBC 5-10 10/15/2017 11:02 PM         Medications Reviewed:     Current Facility-Administered Medications   Medication Dose Route Frequency    TPN ADULT - CENTRAL   IntraVENous CONTINUOUS    predniSONE (DELTASONE) tablet 20 mg  20 mg Oral BID WITH MEALS    HYDROmorphone (PF) (DILAUDID) injection 1 mg  1 mg IntraVENous Q4H PRN    sodium chloride (NS) flush 20 mL  20 mL InterCATHeter PRN    sodium chloride (NS) flush 10 mL  10 mL InterCATHeter Q24H    sodium chloride (NS) flush 10 mL  10 mL InterCATHeter PRN    sodium chloride (NS) flush 10 mL  10 mL InterCATHeter Q8H    alteplase (CATHFLO) 1 mg in sterile water (preservative free) 1 mL injection  1 mg InterCATHeter PRN    bacitracin 500 unit/gram packet 1 Packet  1 Packet Topical PRN    insulin lispro (HUMALOG) injection   SubCUTAneous Q6H    enoxaparin (LOVENOX) injection 40 mg  40 mg SubCUTAneous Q24H    morphine CR (MS CONTIN) tablet 60 mg  60 mg Oral BID    morphine CR (MS CONTIN) tablet 100 mg  100 mg Oral QHS    morphine IR (MS IR) tablet 30 mg  30 mg Oral Q3H PRN    acetaminophen (TYLENOL) tablet 650 mg  650 mg Oral Q6H PRN    0.9% sodium chloride infusion  75 mL/hr IntraVENous CONTINUOUS    glucose chewable tablet 16 g  4 Tab Oral PRN    dextrose (D50W) injection syrg 12.5-25 g  12.5-25 g IntraVENous PRN    glucagon (GLUCAGEN) injection 1 mg  1 mg IntraMUSCular PRN    fat emulsion 20% (LIPOSYN, INTRAlipid) infusion 500 mL  500 mL IntraVENous Q MON, WED & FRI    clonazePAM (KlonoPIN) tablet 0.5 mg  0.5 mg Oral BID    sodium chloride (NS) flush 5-10 mL  5-10 mL IntraVENous Q8H    sodium chloride (NS) flush 5-10 mL  5-10 mL IntraVENous PRN    HYDROmorphone (PF) 15 mg/30 ml (DILAUDID) PCA   IntraVENous TITRATE    sodium chloride (NS) flush 5-10 mL  5-10 mL IntraVENous PRN    famotidine (PF) (PEPCID) 20 mg in sodium chloride 0.9 % 10 mL injection  20 mg IntraVENous Q12H    ondansetron (ZOFRAN) injection 4 mg  4 mg IntraVENous Q6H    sodium chloride (NS) flush 5-10 mL  5-10 mL IntraVENous PRN     ______________________________________________________________________  EXPECTED LENGTH OF STAY: 3d 14h  ACTUAL LENGTH OF STAY:          5                 Randal Calderon MD

## 2017-10-21 NOTE — PROGRESS NOTES
[address], [address] [facility]    GI PROGRESS NOTE    NAME: Nina Smith   :  1975   MRN:  [id number]       Subjective:   C/o pain, requesting to split prednisone dose to bid    Review of Systems    Constitutional: negative fever, negative chills, negative weight loss  Eyes:   negative visual changes  ENT:   negative sore throat, tongue or lip swelling  Respiratory:  negative cough, negative dyspnea  Cards:  negative for chest pain, palpitations, lower extremity edema  GI:   See HPI  :  negative for frequency, dysuria  Integument:  negative for rash and pruritus  Heme:  negative for easy bruising and gum/nose bleeding  Musculoskel: negative for myalgias,  back pain and muscle weakness  Neuro: negative for headaches, dizziness, vertigo  Psych:  negative for feelings of anxiety, depression         Objective:     VITALS:   Last 24hrs VS reviewed since prior progress note. Most recent are:  Visit Vitals    /73 (BP 1 Location: Left arm, BP Patient Position: At rest)    Pulse 100    Temp 97.7 °F (36.5 °C)    Resp 22    Ht 5' 4\" (1.626 m)    Wt 145.7 kg (321 lb 3.4 oz)    SpO2 100%    BMI 55.14 kg/m2       Intake/Output Summary (Last 24 hours) at 10/21/17 1353  Last data filed at 10/21/17 1238   Gross per 24 hour   Intake                0 ml   Output             4750 ml   Net            -4750 ml     PHYSICAL EXAM:  General: WD, WN. Alert, cooperative, no acute distress    HEENT: NC, Atraumatic. PERRLA, EOMI. Anicteric sclerae. Lungs:  CTA Bilaterally. No Wheezing/Rhonchi/Rales. Heart:  Regular  rhythm,  No murmur (), No Rubs, No Gallops  Abdomen: Soft, Non distended, Non tender.  +Bowel sounds, no HSM, EC fistulae  Extremities: No c/c/e  Neurologic:  CN 2-12 gi, Alert and oriented X 3. No acute neurological distress   Psych:   Good insight. Not anxious nor agitated.     Lab Data Reviewed:   Recent Labs      10/20/17   0337   WBC  21.3*   HGB  9.9* HCT  31.2*   PLT  280     Recent Labs      10/21/17   0411  10/20/17   0337  10/19/17   0038   NA  138  136  133*   K  3.6  4.2  4.9   CL  111*  109*  106   CO2  20*  20*  16*   BUN  30*  33*  33*   CREA  0.87  1.01  1.04*   GLU  202*  262*  452*   PHOS   --    --   4.5   CA  9.3  8.8  9.2     No results for input(s): SGOT, GPT, AP, TBIL, TP, ALB, GLOB, GGT, AML, LPSE in the last 72 hours.     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

## 2017-10-21 NOTE — PROGRESS NOTES
Bedside and Verbal shift change report given to Sarah Auguste RN (oncoming nurse) by Silas Singh RN (offgoing nurse). Report included the following information SBAR, Kardex, Intake/Output, MAR and Recent Results.

## 2017-10-21 NOTE — PROGRESS NOTES
Bedside shift change report given to [address] [place] [address] Drive (oncoming nurse) by Meghan Brothers (offgoing nurse). Report included the following information SBAR, Kardex, ED Summary, Procedure Summary, Intake/Output, MAR and Recent Results.

## 2017-10-22 LAB
ANION GAP SERPL CALC-SCNC: 7 MMOL/L (ref 5–15)
BUN SERPL-MCNC: 31 MG/DL (ref 6–20)
BUN/CREAT SERPL: 39 (ref 12–20)
CALCIUM SERPL-MCNC: 9.4 MG/DL (ref 8.5–10.1)
CHLORIDE SERPL-SCNC: 109 MMOL/L (ref 97–108)
CO2 SERPL-SCNC: 20 MMOL/L (ref 21–32)
CREAT SERPL-MCNC: 0.79 MG/DL (ref 0.55–1.02)
GLUCOSE BLD STRIP.AUTO-MCNC: 112 MG/DL (ref 65–100)
GLUCOSE BLD STRIP.AUTO-MCNC: 142 MG/DL (ref 65–100)
GLUCOSE BLD STRIP.AUTO-MCNC: 152 MG/DL (ref 65–100)
GLUCOSE BLD STRIP.AUTO-MCNC: 161 MG/DL (ref 65–100)
GLUCOSE BLD STRIP.AUTO-MCNC: 190 MG/DL (ref 65–100)
GLUCOSE SERPL-MCNC: 177 MG/DL (ref 65–100)
MAGNESIUM SERPL-MCNC: 1.7 MG/DL (ref 1.6–2.4)
POTASSIUM SERPL-SCNC: 3.9 MMOL/L (ref 3.5–5.1)
SERVICE CMNT-IMP: ABNORMAL
SODIUM SERPL-SCNC: 136 MMOL/L (ref 136–145)

## 2017-10-22 PROCEDURE — 77030010520

## 2017-10-22 PROCEDURE — 74011250636 HC RX REV CODE- 250/636: Performed by: INTERNAL MEDICINE

## 2017-10-22 PROCEDURE — 74011250637 HC RX REV CODE- 250/637: Performed by: PHYSICAL MEDICINE & REHABILITATION

## 2017-10-22 PROCEDURE — 74011250636 HC RX REV CODE- 250/636: Performed by: HOSPITALIST

## 2017-10-22 PROCEDURE — 82962 GLUCOSE BLOOD TEST: CPT

## 2017-10-22 PROCEDURE — 74011250637 HC RX REV CODE- 250/637: Performed by: HOSPITALIST

## 2017-10-22 PROCEDURE — 74011000250 HC RX REV CODE- 250: Performed by: NURSE PRACTITIONER

## 2017-10-22 PROCEDURE — 74011000250 HC RX REV CODE- 250: Performed by: HOSPITALIST

## 2017-10-22 PROCEDURE — 74011000258 HC RX REV CODE- 258: Performed by: HOSPITALIST

## 2017-10-22 PROCEDURE — 36415 COLL VENOUS BLD VENIPUNCTURE: CPT | Performed by: HOSPITALIST

## 2017-10-22 PROCEDURE — 74011250637 HC RX REV CODE- 250/637: Performed by: INTERNAL MEDICINE

## 2017-10-22 PROCEDURE — 65270000029 HC RM PRIVATE

## 2017-10-22 PROCEDURE — 83735 ASSAY OF MAGNESIUM: CPT | Performed by: HOSPITALIST

## 2017-10-22 PROCEDURE — 74011636637 HC RX REV CODE- 636/637: Performed by: HOSPITALIST

## 2017-10-22 PROCEDURE — 77030011641 HC PASTE OST ADH BMS -A

## 2017-10-22 PROCEDURE — 80048 BASIC METABOLIC PNL TOTAL CA: CPT | Performed by: HOSPITALIST

## 2017-10-22 PROCEDURE — 74011636637 HC RX REV CODE- 636/637: Performed by: INTERNAL MEDICINE

## 2017-10-22 RX ORDER — ONDANSETRON 2 MG/ML
4 INJECTION INTRAMUSCULAR; INTRAVENOUS
Status: DISCONTINUED | OUTPATIENT
Start: 2017-10-22 | End: 2017-10-27 | Stop reason: HOSPADM

## 2017-10-22 RX ADMIN — HYDROMORPHONE HYDROCHLORIDE 1 MG: 1 INJECTION, SOLUTION INTRAMUSCULAR; INTRAVENOUS; SUBCUTANEOUS at 22:30

## 2017-10-22 RX ADMIN — Medication 10 ML: at 06:13

## 2017-10-22 RX ADMIN — ASCORBIC ACID, VITAMIN A PALMITATE, CHOLECALCIFEROL, THIAMINE HYDROCHLORIDE, RIBOFLAVIN-5 PHOSPHATE SODIUM, PYRIDOXINE HYDROCHLORIDE, NIACINAMIDE, DEXPANTHENOL, ALPHA-TOCOPHEROL ACETATE, VITAMIN K1, FOLIC ACID, BIOTIN, CYANOCOBALAMIN: 200; 3300; 200; 6; 3.6; 6; 40; 15; 10; 150; 600; 60; 5 INJECTION, SOLUTION INTRAVENOUS at 18:44

## 2017-10-22 RX ADMIN — Medication 10 ML: at 13:54

## 2017-10-22 RX ADMIN — HYDROMORPHONE HYDROCHLORIDE 1 MG: 1 INJECTION, SOLUTION INTRAMUSCULAR; INTRAVENOUS; SUBCUTANEOUS at 00:52

## 2017-10-22 RX ADMIN — ONDANSETRON 4 MG: 2 INJECTION INTRAMUSCULAR; INTRAVENOUS at 00:22

## 2017-10-22 RX ADMIN — CLONAZEPAM 0.5 MG: 0.5 TABLET ORAL at 23:34

## 2017-10-22 RX ADMIN — INSULIN LISPRO 2 UNITS: 100 INJECTION, SOLUTION INTRAVENOUS; SUBCUTANEOUS at 12:50

## 2017-10-22 RX ADMIN — MORPHINE SULFATE 30 MG: 15 TABLET ORAL at 01:56

## 2017-10-22 RX ADMIN — HYDROMORPHONE HYDROCHLORIDE 1 MG: 1 INJECTION, SOLUTION INTRAMUSCULAR; INTRAVENOUS; SUBCUTANEOUS at 18:02

## 2017-10-22 RX ADMIN — Medication 10 ML: at 22:34

## 2017-10-22 RX ADMIN — MORPHINE SULFATE 30 MG: 15 TABLET ORAL at 23:55

## 2017-10-22 RX ADMIN — Medication 10 ML: at 11:54

## 2017-10-22 RX ADMIN — ONDANSETRON 4 MG: 2 INJECTION INTRAMUSCULAR; INTRAVENOUS at 06:10

## 2017-10-22 RX ADMIN — MORPHINE SULFATE 60 MG: 60 TABLET, FILM COATED, EXTENDED RELEASE ORAL at 09:11

## 2017-10-22 RX ADMIN — ENOXAPARIN SODIUM 40 MG: 40 INJECTION SUBCUTANEOUS at 11:33

## 2017-10-22 RX ADMIN — ONDANSETRON 4 MG: 2 INJECTION INTRAMUSCULAR; INTRAVENOUS at 11:58

## 2017-10-22 RX ADMIN — INSULIN LISPRO 2 UNITS: 100 INJECTION, SOLUTION INTRAVENOUS; SUBCUTANEOUS at 06:10

## 2017-10-22 RX ADMIN — FAMOTIDINE 20 MG: 10 INJECTION, SOLUTION INTRAVENOUS at 22:34

## 2017-10-22 RX ADMIN — INSULIN LISPRO 2 UNITS: 100 INJECTION, SOLUTION INTRAVENOUS; SUBCUTANEOUS at 00:22

## 2017-10-22 RX ADMIN — Medication: at 08:39

## 2017-10-22 RX ADMIN — BACITRACIN 1 PACKET: 500 OINTMENT TOPICAL at 11:33

## 2017-10-22 RX ADMIN — MORPHINE SULFATE 100 MG: 100 TABLET, EXTENDED RELEASE ORAL at 22:33

## 2017-10-22 RX ADMIN — MORPHINE SULFATE 60 MG: 60 TABLET, FILM COATED, EXTENDED RELEASE ORAL at 18:44

## 2017-10-22 RX ADMIN — MORPHINE SULFATE 30 MG: 15 TABLET ORAL at 05:53

## 2017-10-22 RX ADMIN — HYDROMORPHONE HYDROCHLORIDE 1 MG: 1 INJECTION, SOLUTION INTRAMUSCULAR; INTRAVENOUS; SUBCUTANEOUS at 13:54

## 2017-10-22 RX ADMIN — MORPHINE SULFATE 30 MG: 15 TABLET ORAL at 11:32

## 2017-10-22 RX ADMIN — HYDROMORPHONE HYDROCHLORIDE 1 MG: 1 INJECTION, SOLUTION INTRAMUSCULAR; INTRAVENOUS; SUBCUTANEOUS at 04:52

## 2017-10-22 RX ADMIN — PREDNISONE 20 MG: 10 TABLET ORAL at 18:02

## 2017-10-22 RX ADMIN — HYDROMORPHONE HYDROCHLORIDE 1 MG: 1 INJECTION, SOLUTION INTRAMUSCULAR; INTRAVENOUS; SUBCUTANEOUS at 09:11

## 2017-10-22 RX ADMIN — FAMOTIDINE 20 MG: 10 INJECTION, SOLUTION INTRAVENOUS at 09:10

## 2017-10-22 RX ADMIN — MORPHINE SULFATE 30 MG: 15 TABLET ORAL at 19:54

## 2017-10-22 RX ADMIN — MORPHINE SULFATE 30 MG: 15 TABLET ORAL at 15:12

## 2017-10-22 RX ADMIN — PREDNISONE 20 MG: 10 TABLET ORAL at 07:03

## 2017-10-22 NOTE — PROGRESS NOTES
Hospitalist Progress Note                                            Date of Service:  10/22/2017  NAME:  Sincere Swanson  :  1975  MRN:  [id number]      Admission Summary:   Per H&P \"The patient is a 31-year-old white   female with past medical history of Crohn disease, DVT, incarcerated   ventral hernia, chronic abdominal pain, morbid obesity, seizures,   stroke and multiple repeat abdominal surgeries, presented to the   emergency department via EMS from  [facility] with chief   complaint of abdominal pain. The patient has chronic abdominal pain. She has been on Dilaudid at [facility]. She notably has been on a PCA   according to her mother's report. In addition, she takes additional   doses of Dilaudid intermittently. Tonight she complains of stabbing,   severe generalized abdominal pain mostly in epigastrium according to   initial reports, rated at 10/10,  aggravated with any motion and   movement, without specific alleviating factors. She complains   of nausea, vomiting, chills, cough productive of clear sputum and per   the ER soreness near her vagina. She has open abdominal wound,   enterocutaneous fistula, which drains into collection bag. The patient   notes there has been some leakage around the fistula. She also has a   ileostomy and her mother notes there has been some \"blood\" seen   within the ostomy bag. The patient's recent history has been   complicated over the last several months. She reportedly had   undergone capsule endoscopy. She was subsequently admitted on   2017 secondary to retained video capsule. She underwent single   balloon enteroscopy on 2017 with iatrogenic bowel perforation   during enteroscopy.  She reportedly notably underwent diagnostic   laparoscopy converted to open exploratory laparotomy with extensive   (7-hour) lysis of adhesions with intraoperative upper endoscopy   performed and physical findings of a sealed perforated jejunum. Postoperatively the patient was noted to have MRSA wound infection   treated with IV vancomycin. She was discharged home with oral   antibiotics. The patient returned to the emergency department on   06/25/2017 with abdominal pain. She went back to OR on 06/28/2017   with this diagnosis of ischemic bowel and underwent exploratory   laparotomy, extensive lysis of adhesions with repair of enterotomy and   SMA stent on the left. Per review of chart records patient developed a   fistula  which required daily wound care, started on TPN. She was   essentially bed bound during the hospitalization and has   required multiple drains, which were eventually removed and replaced   with a wound VAC. She was eventually discharged to [facility]   with morphine PCA pump, TPN as well as additional oral pain   medications with intractable pain. During hospitalization, the patient   was seen by general surgery, vascular surgery, infectious disease,   cardiology, pulmonary  and palliative care services. The patient has   continued to require long-term skilled nursing care. She was   reportedly  recently treated for a UTI. There are no complaints   of syncope, loss of consciousness, new onset numbness,   paresthesias, slurred speech, facial droop, visual disturbance,   headache, neck pain, back pain, chest pain although at present, she   complains of chronic chest wall pain with light palpation and   hypersensitivity of her skin. She has not complained dysuria or   hematuria, increased calf pain, swelling, edema, fever, chills or rash. On arrival in emergency department tonight, initial recorded vital signs   were blood pressure 122/43, heart rate 128, respiratory rate of 20, O2   saturation is 100% on room air.  Workup included CT abdomen and   pelvis without contrast which showed large anterior abdominal wall and   site of previous known enterocutaneous fistula with mild stranding   around anterior abdominal wound as well as abdominal mesentery,   nonspecific wall thickening and inflammatory appearance of the   duodenum with nonspecific wall thickening with inflammatory   appearance of the duodenum. Labs showed elevated WBC 23,500. The patient had elevated BUN 94, creatinine of 2.26 and GFR of 24. Per the ED the patient was given Dilaudid 2 mg IV x2 doses, Zofran 8   mg IV x1 dose, Zofran 3.375 grams IV, levofloxacin 750 mg IV,   vancomycin 2000 mg IV. Blood cultures have been sent. The patient is   now seen for admission to the hospitalist service for continued   evaluation and treatments. The patient has already been seen by   general surgeon in consultation\". Interval history / Subjective:   States she had a rough night around 3 AM her pain was uncontrolled, though tolerating full liquids and wants to advance to mechanical soft diet. No vomiting. Fistulae output is unchanged. Very resistant to weaning analgesics and became tearful when I told her the PCA will be reduced, but in the end she was agreeable. Assessment & Plan:   Acute on chronic abdominal pain: (POA) the etiology is unclear. This may be a Crohn's flare vs worsening of chronic pain  -CT abdomen and pelvis showed arge anterior abdominal wound, and a site of previous/known enterocutaneous fistula though this is difficult to assess given lack of oral and IV contrastmaterial. Mild stranding around the anterior abdominal wound as well as in the abdominal mesentery. Nonspecific wall thickening/inflammatory appearance of the duodenum. -palliative care consulted, recs appreciated. Currently on Dilaudid PCA which is being weaned (was on this at [facility]), fentanyl patch changed to ER morphine, IR PO liquid morphine PRN, IV Dilaudid PRN.  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 Wound care team following. Systemic inflammatory response syndrome (POA)   -likely due to Crohn's, Intra-abdominal infection possible  -stopped levofloxacin 10/17, vancomycin stopped 10/18, Zosyn stopped (10/20)    Hyponatremia Improved with fluids; monitor     Hypokalemia, hypomagnesemia, hypophosphatemia: Resolved with adjusting TPN; monitor    Hypercalcemia: mild, likely due to TPN; improved after adjusting TPN    Hyperglycemia: due to TPN; increased insulin in TPN to 15 U/L with better glycemic control    DEAN pre-renal, resolved with IV fluids    Chronic pain syndrome: palliative care management appreciated    Morbid obesity    Diet: clears, TPN  Code status: full  DVT prophylaxis: sq Lovenox  Dispo: [facility] vs home, wants to go home    Hospital Problems  Date Reviewed: 10/16/2017          Codes Class Noted POA    Acute hyponatremia ICD-10-CM: E87.1  ICD-9-CM: 276.1  10/16/2017 Unknown        Leukocytosis ICD-10-CM: D72.829  ICD-9-CM: 288.60  10/16/2017 Unknown        Wound, open, anterior abdominal wall ICD-10-CM: S31.109A  ICD-9-CM: 879.2  10/16/2017 Unknown        * (Principal)SIRS (systemic inflammatory response syndrome) ([facility] ICD-10-CM: R65.10  ICD-9-CM: 995.90  10/16/2017 Unknown        DEAN (acute kidney injury) ([facility] ICD-10-CM: N17.9  ICD-9-CM: 584.9  10/16/2017 Unknown        Abdominal pain ICD-10-CM: R10.9  ICD-9-CM: 789.00  6/25/2017 Unknown                Review of Systems:   Pertinent items are noted in HPI. Physical Examination:      Last 24hrs VS reviewed since prior progress note.  Most recent are:  Visit Vitals    /78 (BP 1 Location: Left arm, BP Patient Position: At rest)    Pulse (!) 106    Temp 98 °F (36.7 °C)    Resp 20    Ht 5' 4\" (1.626 m)    Wt 145 kg (319 lb 10.7 oz)    SpO2 100%    BMI 54.87 kg/m2       physical exam unchanged    Constitutional:  Drowsy, NAD   ENT:  anicteric sclerae, pale conjunctiva, MMM       Resp:  CTA b/l, normal work of breathing   CV:  RRR, no MRG, no peripheral edema    GI:  soft, bag over fistula draining enteric contents, abd is obese otherwise   :          Musculoskeletal:  No edema, warm,    Neurologic:  EOMI, CN grossly intact, normal speech, moves all extremities              Intake/Output Summary (Last 24 hours) at 10/22/17 1359  Last data filed at 10/22/17 1352   Gross per 24 hour   Intake          9930.75 ml   Output             7890 ml   Net          2040.75 ml          Data Review:    Review and/or order of clinical lab test  Review and/or order of tests in the radiology section of CPT  Review and/or order of tests in the medicine section of CPT      Labs:     Recent Labs      10/20/17   0337   WBC  21.3*   HGB  9.9*   HCT  31.2*   PLT  280     Recent Labs      10/22/17   0209  10/21/17   0411  10/20/17   0337   NA  136  138  136   K  3.9  3.6  4.2   CL  109*  111*  109*   CO2  20*  20*  20*   BUN  31*  30*  33*   CREA  0.79  0.87  1.01   GLU  177*  202*  262*   CA  9.4  9.3  8.8   MG  1.7  1.7  1.8     No results for input(s): SGOT, GPT, ALT, AP, TBIL, TBILI, TP, ALB, GLOB, GGT, AML, LPSE in the last 72 hours. No lab exists for component: AMYP, HLPSE  No results for input(s): INR, PTP, APTT in the last 72 hours. No lab exists for component: INREXT, INREXT   No results for input(s): FE, TIBC, PSAT, FERR in the last 72 hours. No results found for: FOL, RBCF   No results for input(s): PH, PCO2, PO2 in the last 72 hours. No results for input(s): CPK, CKNDX, TROIQ in the last 72 hours.     No lab exists for component: CPKMB  No results found for: CHOL, CHOLX, CHLST, CHOLV, HDL, LDL, LDLC, DLDLP, TGLX, TRIGL, TRIGP, CHHD, [facility]  Lab Results   Component Value Date/Time    Glucose (POC) 142 10/22/2017 11:56 AM    Glucose (POC) 161 10/22/2017 05:52 AM    Glucose (POC) 167 10/21/2017 11:36 PM    Glucose (POC) 178 10/21/2017 10:08 PM    Glucose (POC) 186 10/21/2017 05:57 PM     Lab Results   Component Value Date/Time    Color YELLOW/STRAW 10/15/2017 11:02 PM    Appearance CLOUDY 10/15/2017 11:02 PM    Specific gravity 1.019 10/15/2017 11:02 PM    Specific gravity 1.020 07/07/2009 07:20 PM    pH (UA) 5.5 10/15/2017 11:02 PM    Protein TRACE 10/15/2017 11:02 PM    Glucose NEGATIVE  10/15/2017 11:02 PM    Ketone NEGATIVE  10/15/2017 11:02 PM    Bilirubin NEGATIVE  10/15/2017 11:02 PM    Urobilinogen 0.2 10/15/2017 11:02 PM    Nitrites NEGATIVE  10/15/2017 11:02 PM    Leukocyte Esterase MODERATE 10/15/2017 11:02 PM    Epithelial cells FEW 10/15/2017 11:02 PM    Bacteria NEGATIVE  10/15/2017 11:02 PM    WBC 5-10 10/15/2017 11:02 PM    RBC 5-10 10/15/2017 11:02 PM         Medications Reviewed:     Current Facility-Administered Medications   Medication Dose Route Frequency    TPN ADULT - CENTRAL   IntraVENous CONTINUOUS    predniSONE (DELTASONE) tablet 20 mg  20 mg Oral BID WITH MEALS    HYDROmorphone (PF) (DILAUDID) injection 1 mg  1 mg IntraVENous Q4H PRN    sodium chloride (NS) flush 20 mL  20 mL InterCATHeter PRN    sodium chloride (NS) flush 10 mL  10 mL InterCATHeter Q24H    sodium chloride (NS) flush 10 mL  10 mL InterCATHeter PRN    sodium chloride (NS) flush 10 mL  10 mL InterCATHeter Q8H    alteplase (CATHFLO) 1 mg in sterile water (preservative free) 1 mL injection  1 mg InterCATHeter PRN    bacitracin 500 unit/gram packet 1 Packet  1 Packet Topical PRN    insulin lispro (HUMALOG) injection   SubCUTAneous Q6H    enoxaparin (LOVENOX) injection 40 mg  40 mg SubCUTAneous Q24H    morphine CR (MS CONTIN) tablet 60 mg  60 mg Oral BID    morphine CR (MS CONTIN) tablet 100 mg  100 mg Oral QHS    morphine IR (MS IR) tablet 30 mg  30 mg Oral Q3H PRN    acetaminophen (TYLENOL) tablet 650 mg  650 mg Oral Q6H PRN    glucose chewable tablet 16 g  4 Tab Oral PRN    dextrose (D50W) injection syrg 12.5-25 g  12.5-25 g IntraVENous PRN    glucagon (GLUCAGEN) injection 1 mg  1 mg IntraMUSCular PRN    fat emulsion 20% (LIPOSYN, INTRAlipid) infusion 500 mL  500 mL IntraVENous Q MON, WED & FRI    clonazePAM (KlonoPIN) tablet 0.5 mg  0.5 mg Oral BID    sodium chloride (NS) flush 5-10 mL  5-10 mL IntraVENous Q8H    sodium chloride (NS) flush 5-10 mL  5-10 mL IntraVENous PRN    HYDROmorphone (PF) 15 mg/30 ml (DILAUDID) PCA   IntraVENous TITRATE    sodium chloride (NS) flush 5-10 mL  5-10 mL IntraVENous PRN    famotidine (PF) (PEPCID) 20 mg in sodium chloride 0.9 % 10 mL injection  20 mg IntraVENous Q12H    ondansetron (ZOFRAN) injection 4 mg  4 mg IntraVENous Q6H    sodium chloride (NS) flush 5-10 mL  5-10 mL IntraVENous PRN     ______________________________________________________________________  EXPECTED LENGTH OF STAY: 3d 14h  ACTUAL LENGTH OF STAY:          6                 Mitzie Boeck, MD

## 2017-10-22 NOTE — PROGRESS NOTES
Bedside shift change report given to [facility] CHARBEL (oncoming nurse) by Brook Victor (offgoing nurse). Report included the following information SBAR.

## 2017-10-22 NOTE — PROGRESS NOTES
Bedside and Verbal shift change report given to Mariaa Becker (oncoming nurse) by [facility] Report included the following information SBAR, Kardex, MAR and Recent Results.

## 2017-10-22 NOTE — PROGRESS NOTES
Problem: Falls - Risk of  Goal: *Absence of Falls  Document Marlene Fall Risk and appropriate interventions in the flowsheet.    Outcome: Progressing Towards Goal  Fall Risk Interventions:  Mobility Interventions: Communicate number of staff needed for ambulation/transfer, Patient to call before getting OOB    Mentation Interventions: Adequate sleep, hydration, pain control, Evaluate medications/consider consulting pharmacy, Family/sitter at bedside, More frequent rounding    Medication Interventions: Patient to call before getting OOB    Elimination Interventions: Patient to call for help with toileting needs, Toileting schedule/hourly rounds

## 2017-10-22 NOTE — PROGRESS NOTES
Problem: Falls - Risk of  Goal: *Absence of Falls  Document Marlene Fall Risk and appropriate interventions in the flowsheet.    Outcome: Progressing Towards Goal  Fall Risk Interventions:  Mobility Interventions: Communicate number of staff needed for ambulation/transfer    Mentation Interventions: Adequate sleep, hydration, pain control    Medication Interventions: Patient to call before getting OOB    Elimination Interventions: Patient to call for help with toileting needs

## 2017-10-22 NOTE — PROGRESS NOTES
Bedside shift change report given to [address] [place] [address] Drive (oncoming nurse) by Jean Taylor (offgoing nurse). Report included the following information SBAR, Kardex, Intake/Output, MAR and Recent Results.

## 2017-10-23 LAB
ANION GAP SERPL CALC-SCNC: 6 MMOL/L (ref 5–15)
BUN SERPL-MCNC: 27 MG/DL (ref 6–20)
BUN/CREAT SERPL: 39 (ref 12–20)
CALCIUM SERPL-MCNC: 8.5 MG/DL (ref 8.5–10.1)
CHLORIDE SERPL-SCNC: 108 MMOL/L (ref 97–108)
CO2 SERPL-SCNC: 23 MMOL/L (ref 21–32)
CREAT SERPL-MCNC: 0.69 MG/DL (ref 0.55–1.02)
ERYTHROCYTE [DISTWIDTH] IN BLOOD BY AUTOMATED COUNT: 16.8 % (ref 11.5–14.5)
GLUCOSE BLD STRIP.AUTO-MCNC: 115 MG/DL (ref 65–100)
GLUCOSE BLD STRIP.AUTO-MCNC: 155 MG/DL (ref 65–100)
GLUCOSE BLD STRIP.AUTO-MCNC: 158 MG/DL (ref 65–100)
GLUCOSE BLD STRIP.AUTO-MCNC: 171 MG/DL (ref 65–100)
GLUCOSE BLD STRIP.AUTO-MCNC: 209 MG/DL (ref 65–100)
GLUCOSE SERPL-MCNC: 154 MG/DL (ref 65–100)
HCT VFR BLD AUTO: 28 % (ref 35–47)
HGB BLD-MCNC: 9.2 G/DL (ref 11.5–16)
MAGNESIUM SERPL-MCNC: 1.4 MG/DL (ref 1.6–2.4)
MCH RBC QN AUTO: 28.1 PG (ref 26–34)
MCHC RBC AUTO-ENTMCNC: 32.9 G/DL (ref 30–36.5)
MCV RBC AUTO: 85.6 FL (ref 80–99)
PHOSPHATE SERPL-MCNC: 2.3 MG/DL (ref 2.6–4.7)
PLATELET # BLD AUTO: 256 K/UL (ref 150–400)
POTASSIUM SERPL-SCNC: 3.5 MMOL/L (ref 3.5–5.1)
RBC # BLD AUTO: 3.27 M/UL (ref 3.8–5.2)
SERVICE CMNT-IMP: ABNORMAL
SODIUM SERPL-SCNC: 137 MMOL/L (ref 136–145)
WBC # BLD AUTO: 18.9 K/UL (ref 3.6–11)

## 2017-10-23 PROCEDURE — 74011250636 HC RX REV CODE- 250/636: Performed by: HOSPITALIST

## 2017-10-23 PROCEDURE — 80048 BASIC METABOLIC PNL TOTAL CA: CPT | Performed by: HOSPITALIST

## 2017-10-23 PROCEDURE — 74011250637 HC RX REV CODE- 250/637: Performed by: HOSPITALIST

## 2017-10-23 PROCEDURE — 74011636637 HC RX REV CODE- 636/637: Performed by: INTERNAL MEDICINE

## 2017-10-23 PROCEDURE — 36415 COLL VENOUS BLD VENIPUNCTURE: CPT | Performed by: HOSPITALIST

## 2017-10-23 PROCEDURE — 83735 ASSAY OF MAGNESIUM: CPT | Performed by: HOSPITALIST

## 2017-10-23 PROCEDURE — 74011250637 HC RX REV CODE- 250/637: Performed by: INTERNAL MEDICINE

## 2017-10-23 PROCEDURE — 65270000029 HC RM PRIVATE

## 2017-10-23 PROCEDURE — 74011000250 HC RX REV CODE- 250: Performed by: NURSE PRACTITIONER

## 2017-10-23 PROCEDURE — 82962 GLUCOSE BLOOD TEST: CPT

## 2017-10-23 PROCEDURE — 74011250636 HC RX REV CODE- 250/636: Performed by: INTERNAL MEDICINE

## 2017-10-23 PROCEDURE — 74011000258 HC RX REV CODE- 258: Performed by: HOSPITALIST

## 2017-10-23 PROCEDURE — 74011636637 HC RX REV CODE- 636/637: Performed by: HOSPITALIST

## 2017-10-23 PROCEDURE — 84100 ASSAY OF PHOSPHORUS: CPT | Performed by: HOSPITALIST

## 2017-10-23 PROCEDURE — 85027 COMPLETE CBC AUTOMATED: CPT | Performed by: HOSPITALIST

## 2017-10-23 PROCEDURE — 74011250637 HC RX REV CODE- 250/637: Performed by: PHYSICAL MEDICINE & REHABILITATION

## 2017-10-23 PROCEDURE — 74011000250 HC RX REV CODE- 250: Performed by: HOSPITALIST

## 2017-10-23 RX ORDER — MAGNESIUM SULFATE HEPTAHYDRATE 40 MG/ML
2 INJECTION, SOLUTION INTRAVENOUS ONCE
Status: COMPLETED | OUTPATIENT
Start: 2017-10-23 | End: 2017-10-23

## 2017-10-23 RX ORDER — SODIUM,POTASSIUM PHOSPHATES 280-250MG
1 POWDER IN PACKET (EA) ORAL 4 TIMES DAILY
Status: DISCONTINUED | OUTPATIENT
Start: 2017-10-23 | End: 2017-10-25

## 2017-10-23 RX ADMIN — FAMOTIDINE 20 MG: 10 INJECTION, SOLUTION INTRAVENOUS at 09:16

## 2017-10-23 RX ADMIN — MORPHINE SULFATE 30 MG: 15 TABLET ORAL at 07:04

## 2017-10-23 RX ADMIN — ASCORBIC ACID, VITAMIN A PALMITATE, CHOLECALCIFEROL, THIAMINE HYDROCHLORIDE, RIBOFLAVIN-5 PHOSPHATE SODIUM, PYRIDOXINE HYDROCHLORIDE, NIACINAMIDE, DEXPANTHENOL, ALPHA-TOCOPHEROL ACETATE, VITAMIN K1, FOLIC ACID, BIOTIN, CYANOCOBALAMIN: 200; 3300; 200; 6; 3.6; 6; 40; 15; 10; 150; 600; 60; 5 INJECTION, SOLUTION INTRAVENOUS at 18:22

## 2017-10-23 RX ADMIN — Medication 10 ML: at 21:58

## 2017-10-23 RX ADMIN — MORPHINE SULFATE 100 MG: 100 TABLET, EXTENDED RELEASE ORAL at 21:57

## 2017-10-23 RX ADMIN — CLONAZEPAM 0.5 MG: 0.5 TABLET ORAL at 09:12

## 2017-10-23 RX ADMIN — Medication 10 ML: at 11:39

## 2017-10-23 RX ADMIN — Medication: at 04:37

## 2017-10-23 RX ADMIN — ONDANSETRON 4 MG: 2 INJECTION INTRAMUSCULAR; INTRAVENOUS at 08:58

## 2017-10-23 RX ADMIN — POTASSIUM & SODIUM PHOSPHATES POWDER PACK 280-160-250 MG 1 PACKET: 280-160-250 PACK at 21:58

## 2017-10-23 RX ADMIN — MORPHINE SULFATE 30 MG: 15 TABLET ORAL at 21:57

## 2017-10-23 RX ADMIN — Medication 10 ML: at 18:36

## 2017-10-23 RX ADMIN — POTASSIUM & SODIUM PHOSPHATES POWDER PACK 280-160-250 MG 1 PACKET: 280-160-250 PACK at 18:19

## 2017-10-23 RX ADMIN — PREDNISONE 20 MG: 10 TABLET ORAL at 09:12

## 2017-10-23 RX ADMIN — HYDROMORPHONE HYDROCHLORIDE 1 MG: 1 INJECTION, SOLUTION INTRAMUSCULAR; INTRAVENOUS; SUBCUTANEOUS at 07:42

## 2017-10-23 RX ADMIN — FAMOTIDINE 20 MG: 10 INJECTION, SOLUTION INTRAVENOUS at 21:57

## 2017-10-23 RX ADMIN — Medication 10 ML: at 07:33

## 2017-10-23 RX ADMIN — INSULIN LISPRO 2 UNITS: 100 INJECTION, SOLUTION INTRAVENOUS; SUBCUTANEOUS at 12:22

## 2017-10-23 RX ADMIN — MORPHINE SULFATE 60 MG: 60 TABLET, FILM COATED, EXTENDED RELEASE ORAL at 18:10

## 2017-10-23 RX ADMIN — Medication 10 ML: at 15:06

## 2017-10-23 RX ADMIN — MORPHINE SULFATE 30 MG: 15 TABLET ORAL at 11:38

## 2017-10-23 RX ADMIN — ONDANSETRON 4 MG: 2 INJECTION INTRAMUSCULAR; INTRAVENOUS at 21:57

## 2017-10-23 RX ADMIN — HYDROMORPHONE HYDROCHLORIDE 1 MG: 1 INJECTION, SOLUTION INTRAMUSCULAR; INTRAVENOUS; SUBCUTANEOUS at 16:36

## 2017-10-23 RX ADMIN — MORPHINE SULFATE 30 MG: 15 TABLET ORAL at 14:58

## 2017-10-23 RX ADMIN — ENOXAPARIN SODIUM 40 MG: 40 INJECTION SUBCUTANEOUS at 11:39

## 2017-10-23 RX ADMIN — PREDNISONE 20 MG: 10 TABLET ORAL at 16:38

## 2017-10-23 RX ADMIN — MORPHINE SULFATE 30 MG: 15 TABLET ORAL at 04:09

## 2017-10-23 RX ADMIN — HYDROMORPHONE HYDROCHLORIDE 1 MG: 1 INJECTION, SOLUTION INTRAMUSCULAR; INTRAVENOUS; SUBCUTANEOUS at 03:24

## 2017-10-23 RX ADMIN — Medication 10 ML: at 05:55

## 2017-10-23 RX ADMIN — POTASSIUM & SODIUM PHOSPHATES POWDER PACK 280-160-250 MG 1 PACKET: 280-160-250 PACK at 12:24

## 2017-10-23 RX ADMIN — MORPHINE SULFATE 60 MG: 60 TABLET, FILM COATED, EXTENDED RELEASE ORAL at 09:11

## 2017-10-23 RX ADMIN — MORPHINE SULFATE 30 MG: 15 TABLET ORAL at 18:37

## 2017-10-23 RX ADMIN — CLONAZEPAM 0.5 MG: 0.5 TABLET ORAL at 21:57

## 2017-10-23 RX ADMIN — POTASSIUM & SODIUM PHOSPHATES POWDER PACK 280-160-250 MG 1 PACKET: 280-160-250 PACK at 09:21

## 2017-10-23 RX ADMIN — Medication 10 ML: at 15:05

## 2017-10-23 RX ADMIN — HYDROMORPHONE HYDROCHLORIDE 1 MG: 1 INJECTION, SOLUTION INTRAMUSCULAR; INTRAVENOUS; SUBCUTANEOUS at 12:18

## 2017-10-23 RX ADMIN — MAGNESIUM SULFATE HEPTAHYDRATE 2 G: 40 INJECTION, SOLUTION INTRAVENOUS at 15:08

## 2017-10-23 RX ADMIN — HYDROMORPHONE HYDROCHLORIDE 1 MG: 1 INJECTION, SOLUTION INTRAMUSCULAR; INTRAVENOUS; SUBCUTANEOUS at 20:47

## 2017-10-23 RX ADMIN — I.V. FAT EMULSION 500 ML: 20 EMULSION INTRAVENOUS at 18:50

## 2017-10-23 RX ADMIN — INSULIN LISPRO 2 UNITS: 100 INJECTION, SOLUTION INTRAVENOUS; SUBCUTANEOUS at 05:54

## 2017-10-23 NOTE — PROGRESS NOTES
NUTRITION       Recommendations:  1. Consider lifting bedrest orders so that the pt can get out of the bed (she is requesting to get up)  -- Note: she was receiving daily PT at [facility] prior to admission    2. Consider increasing TPN to meet 100% of estimated needs since absorption of her oral intake is minimal and current regimen is meeting 87% and 90% of estimated kcal and protein needs, respectively. Goal TPN:  5%AA D20 @ 85 mL/hr x 1 hour   @ 172 mL/hr x 13 hours   @ 85 mL/hr x 1 hour  + 20% lipids, 500 mL 3x/week  + MVI, ascorbic acid 500 mg, zinc sulfate (10 mg/day x 10 days)  -- Adjust insulin as needed (this will increase daily CHO to 481 gm/day vs current of 397 gm/day)     Chart reviewed, discussed with RN. Pt remains on TPN as primary source of nutrition. [facility] Soft diet ordered, but oral intake drains into fistula (output 1300 mL so far today-- 1900 mL yesterday). Pt making an effort to eat, but discussed that TPN is her primary nutrition. Spoke with pt at length about her current situation. She requires encouragement and a good deal of support after being in the hospital setting for 6-7 months. She was encouraged by her progress at [facility] and feels like she's going backwards. She has    Current TPN continues to provide a daily average of 2179 kcal, 99 g protein in 1985 mL-- meeting 87% and 90% of estimated kcal and protein needs, respectively. Would consider increasing TPN to goal to meet 100% of needs in patient with increased energy expenditure and possible Crohn's flare. Above goal recommendation to provide a daily average of 2546 kcal, 120 g protein, 481 gm CHO and 2406 mL. Estimated Nutrition Needs:   Kcals/day: 3148 Kcals/day (0912-7070 kcal/day (MSJ x 1.2-1.3))  Protein: 110 g (110-136 g/day (2-2.5 g/kg IBW))  Fluid: 2500 ml (1 mL/kcal (adjust per fistula output))     Based On:  [facility]  Weight Used:  (Actual wt of 141.6 kg (kcal needs); IBW of 55 kg (pro needs)  )    RD to follow.     [address]

## 2017-10-23 NOTE — PROGRESS NOTES
Bedside and Verbal shift change report given to Latonia (oncoming nurse) by Emmy Miller (offgoing nurse). Report included the following information SBAR, Kardex, Intake/Output, MAR and Recent Results.

## 2017-10-23 NOTE — PROGRESS NOTES
Hospitalist Progress Note                                            Date of Service:  10/23/2017  NAME:  Stacy Roa  :  1975  MRN:  [id number]      Admission Summary:   Per H&P \"The patient is a 72-year-old white   female with past medical history of Crohn disease, DVT, incarcerated   ventral hernia, chronic abdominal pain, morbid obesity, seizures,   stroke and multiple repeat abdominal surgeries, presented to the   emergency department via EMS from  [facility] with chief   complaint of abdominal pain. The patient has chronic abdominal pain. She has been on Dilaudid at [facility]. She notably has been on a PCA   according to her mother's report. In addition, she takes additional   doses of Dilaudid intermittently. Tonight she complains of stabbing,   severe generalized abdominal pain mostly in epigastrium according to   initial reports, rated at 10/10,  aggravated with any motion and   movement, without specific alleviating factors. She complains   of nausea, vomiting, chills, cough productive of clear sputum and per   the ER soreness near her vagina. She has open abdominal wound,   enterocutaneous fistula, which drains into collection bag. The patient   notes there has been some leakage around the fistula. She also has a   ileostomy and her mother notes there has been some \"blood\" seen   within the ostomy bag. The patient's recent history has been   complicated over the last several months. She reportedly had   undergone capsule endoscopy. She was subsequently admitted on   2017 secondary to retained video capsule. She underwent single   balloon enteroscopy on 2017 with iatrogenic bowel perforation   during enteroscopy.  She reportedly notably underwent diagnostic   laparoscopy converted to open exploratory laparotomy with extensive   (7-hour) lysis of adhesions with intraoperative upper endoscopy   performed and physical findings of a sealed perforated jejunum. Postoperatively the patient was noted to have MRSA wound infection   treated with IV vancomycin. She was discharged home with oral   antibiotics. The patient returned to the emergency department on   06/25/2017 with abdominal pain. She went back to OR on 06/28/2017   with this diagnosis of ischemic bowel and underwent exploratory   laparotomy, extensive lysis of adhesions with repair of enterotomy and   SMA stent on the left. Per review of chart records patient developed a   fistula  which required daily wound care, started on TPN. She was   essentially bed bound during the hospitalization and has   required multiple drains, which were eventually removed and replaced   with a wound VAC. She was eventually discharged to [facility]   with morphine PCA pump, TPN as well as additional oral pain   medications with intractable pain. During hospitalization, the patient   was seen by general surgery, vascular surgery, infectious disease,   cardiology, pulmonary  and palliative care services. The patient has   continued to require long-term skilled nursing care. She was   reportedly  recently treated for a UTI. There are no complaints   of syncope, loss of consciousness, new onset numbness,   paresthesias, slurred speech, facial droop, visual disturbance,   headache, neck pain, back pain, chest pain although at present, she   complains of chronic chest wall pain with light palpation and   hypersensitivity of her skin. She has not complained dysuria or   hematuria, increased calf pain, swelling, edema, fever, chills or rash. On arrival in emergency department tonight, initial recorded vital signs   were blood pressure 122/43, heart rate 128, respiratory rate of 20, O2   saturation is 100% on room air.  Workup included CT abdomen and   pelvis without contrast which showed large anterior abdominal wall and   site of previous known enterocutaneous fistula with mild stranding   around anterior abdominal wound as well as abdominal mesentery,   nonspecific wall thickening and inflammatory appearance of the   duodenum with nonspecific wall thickening with inflammatory   appearance of the duodenum. Labs showed elevated WBC 23,500. The patient had elevated BUN 94, creatinine of 2.26 and GFR of 24. Per the ED the patient was given Dilaudid 2 mg IV x2 doses, Zofran 8   mg IV x1 dose, Zofran 3.375 grams IV, levofloxacin 750 mg IV,   vancomycin 2000 mg IV. Blood cultures have been sent. The patient is   now seen for admission to the hospitalist service for continued   evaluation and treatments. The patient has already been seen by   general surgeon in consultation\". Interval history / Subjective:   States pain is unchanged. Tolerating mechanical soft diet. No emesis. States her pain is better after a prednisone dose but then comes back. No dyspnea. Assessment & Plan:   Acute on chronic abdominal pain: (POA) the etiology is unclear. This may be a Crohn's flare vs worsening of chronic pain  -CT abdomen and pelvis showed arge anterior abdominal wound, and a site of previous/known enterocutaneous fistula though this is difficult to assess given lack of oral and IV contrastmaterial. Mild stranding around the anterior abdominal wound as well as in the abdominal mesentery. Nonspecific wall thickening/inflammatory appearance of the duodenum. -palliative care consulted, recs appreciated. Currently on Dilaudid PCA which is being weaned (was on this at [facility]), fentanyl patch changed to ER morphine, IR PO liquid morphine PRN, IV Dilaudid PRN. Very resistant to weaning analgesics  -change Zofran to PRN per her request  -continue steroids, increase dose? 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 Wound care following.     Systemic inflammatory response syndrome (POA)   -likely due to Crohn's, Intra-abdominal infection possible  -stopped levofloxacin 10/17, vancomycin stopped 10/18, Zosyn stopped (10/20)    Hyponatremia Improved with fluids; monitor     Hypokalemia, hypomagnesemia, hypophosphatemia: Replete magnesium and phos today    Hypercalcemia: mild, likely due to TPN; improved after adjusting TPN    Hyperglycemia: due to TPN; increased insulin in TPN to 15 U/L with better glycemic control    DEAN pre-renal, resolved with IV fluids    Chronic pain syndrome: palliative care management appreciated    Morbid obesity    Diet: clears, TPN  Code status: full  DVT prophylaxis: sq Lovenox  Dispo: [facility] vs home, wants to go home    Hospital Problems  Date Reviewed: 10/16/2017          Codes Class Noted POA    Acute hyponatremia ICD-10-CM: E87.1  ICD-9-CM: 276.1  10/16/2017 Unknown        Leukocytosis ICD-10-CM: D72.829  ICD-9-CM: 288.60  10/16/2017 Unknown        Wound, open, anterior abdominal wall ICD-10-CM: S31.109A  ICD-9-CM: 879.2  10/16/2017 Unknown        * (Principal)SIRS (systemic inflammatory response syndrome) ([facility] ICD-10-CM: R65.10  ICD-9-CM: 995.90  10/16/2017 Unknown        DEAN (acute kidney injury) ([facility] ICD-10-CM: N17.9  ICD-9-CM: 584.9  10/16/2017 Unknown        Abdominal pain ICD-10-CM: R10.9  ICD-9-CM: 789.00  6/25/2017 Unknown                Review of Systems:   Pertinent items are noted in HPI. Physical Examination:      Last 24hrs VS reviewed since prior progress note.  Most recent are:  Visit Vitals    /71 (BP 1 Location: Left arm, BP Patient Position: At rest;Supine)    Pulse 98    Temp 98.2 °F (36.8 °C)    Resp 18    Ht 5' 4\" (1.626 m)    Wt 145 kg (319 lb 10.7 oz)    SpO2 99%    BMI 54.87 kg/m2       physical exam unchanged    Constitutional:  NAD   ENT:  anicteric sclerae, pale conjunctiva, MMM       Resp:  CTA b/l, normal work of breathing   CV:  RRR, no MRG, no peripheral edema    GI:  soft, bag over fistula draining enteric contents, abd is obese otherwise   :          Musculoskeletal:  No edema, warm,    Neurologic:  EOMI, CN grossly intact, normal speech, moves all extremities              Intake/Output Summary (Last 24 hours) at 10/23/17 1123  Last data filed at 10/23/17 0902   Gross per 24 hour   Intake             1742 ml   Output             5950 ml   Net            -4208 ml          Data Review:    Review and/or order of clinical lab test  Review and/or order of tests in the radiology section of CPT  Review and/or order of tests in the medicine section of CPT      Labs:     Recent Labs      10/23/17   0003   WBC  18.9*   HGB  9.2*   HCT  28.0*   PLT  256     Recent Labs      10/23/17   0003  10/22/17   0209  10/21/17   0411   NA  137  136  138   K  3.5  3.9  3.6   CL  108  109*  111*   CO2  23  20*  20*   BUN  27*  31*  30*   CREA  0.69  0.79  0.87   GLU  154*  177*  202*   CA  8.5  9.4  9.3   MG  1.4*  1.7  1.7   PHOS  2.3*   --    --      No results for input(s): SGOT, GPT, ALT, AP, TBIL, TBILI, TP, ALB, GLOB, GGT, AML, LPSE in the last 72 hours. No lab exists for component: AMYP, HLPSE  No results for input(s): INR, PTP, APTT in the last 72 hours. No lab exists for component: INREXT, INREXT   No results for input(s): FE, TIBC, PSAT, FERR in the last 72 hours. No results found for: FOL, RBCF   No results for input(s): PH, PCO2, PO2 in the last 72 hours. No results for input(s): CPK, CKNDX, TROIQ in the last 72 hours.     No lab exists for component: CPKMB  No results found for: CHOL, CHOLX, CHLST, CHOLV, HDL, LDL, LDLC, DLDLP, TGLX, TRIGL, TRIGP, CHHD, CHHDX  Lab Results   Component Value Date/Time    Glucose (POC) 171 10/23/2017 05:48 AM    Glucose (POC) 152 10/22/2017 11:48 PM    Glucose (POC) 190 10/22/2017 09:49 PM    Glucose (POC) 112 10/22/2017 06:14 PM    Glucose (POC) 142 10/22/2017 11:56 AM     Lab Results   Component Value Date/Time    Color YELLOW/STRAW 10/15/2017 11:02 PM Appearance CLOUDY 10/15/2017 11:02 PM    Specific gravity 1.019 10/15/2017 11:02 PM    Specific gravity 1.020 07/07/2009 07:20 PM    pH (UA) 5.5 10/15/2017 11:02 PM    Protein TRACE 10/15/2017 11:02 PM    Glucose NEGATIVE  10/15/2017 11:02 PM    Ketone NEGATIVE  10/15/2017 11:02 PM    Bilirubin NEGATIVE  10/15/2017 11:02 PM    Urobilinogen 0.2 10/15/2017 11:02 PM    Nitrites NEGATIVE  10/15/2017 11:02 PM    Leukocyte Esterase MODERATE 10/15/2017 11:02 PM    Epithelial cells FEW 10/15/2017 11:02 PM    Bacteria NEGATIVE  10/15/2017 11:02 PM    WBC 5-10 10/15/2017 11:02 PM    RBC 5-10 10/15/2017 11:02 PM         Medications Reviewed:     Current Facility-Administered Medications   Medication Dose Route Frequency    potassium, sodium phosphates (NEUTRA-PHOS) packet 1 Packet  1 Packet Oral QID    ondansetron (ZOFRAN) injection 4 mg  4 mg IntraVENous Q6H PRN    predniSONE (DELTASONE) tablet 20 mg  20 mg Oral BID WITH MEALS    HYDROmorphone (PF) (DILAUDID) injection 1 mg  1 mg IntraVENous Q4H PRN    sodium chloride (NS) flush 20 mL  20 mL InterCATHeter PRN    sodium chloride (NS) flush 10 mL  10 mL InterCATHeter Q24H    sodium chloride (NS) flush 10 mL  10 mL InterCATHeter PRN    sodium chloride (NS) flush 10 mL  10 mL InterCATHeter Q8H    alteplase (CATHFLO) 1 mg in sterile water (preservative free) 1 mL injection  1 mg InterCATHeter PRN    bacitracin 500 unit/gram packet 1 Packet  1 Packet Topical PRN    insulin lispro (HUMALOG) injection   SubCUTAneous Q6H    enoxaparin (LOVENOX) injection 40 mg  40 mg SubCUTAneous Q24H    morphine CR (MS CONTIN) tablet 60 mg  60 mg Oral BID    morphine CR (MS CONTIN) tablet 100 mg  100 mg Oral QHS    morphine IR (MS IR) tablet 30 mg  30 mg Oral Q3H PRN    acetaminophen (TYLENOL) tablet 650 mg  650 mg Oral Q6H PRN    glucose chewable tablet 16 g  4 Tab Oral PRN    dextrose (D50W) injection syrg 12.5-25 g  12.5-25 g IntraVENous PRN    glucagon (GLUCAGEN) injection 1 mg 1 mg IntraMUSCular PRN    fat emulsion 20% (LIPOSYN, INTRAlipid) infusion 500 mL  500 mL IntraVENous Q MON, WED & FRI    clonazePAM (KlonoPIN) tablet 0.5 mg  0.5 mg Oral BID    sodium chloride (NS) flush 5-10 mL  5-10 mL IntraVENous Q8H    sodium chloride (NS) flush 5-10 mL  5-10 mL IntraVENous PRN    HYDROmorphone (PF) 15 mg/30 ml (DILAUDID) PCA   IntraVENous TITRATE    sodium chloride (NS) flush 5-10 mL  5-10 mL IntraVENous PRN    famotidine (PF) (PEPCID) 20 mg in sodium chloride 0.9 % 10 mL injection  20 mg IntraVENous Q12H    sodium chloride (NS) flush 5-10 mL  5-10 mL IntraVENous PRN     ______________________________________________________________________  EXPECTED LENGTH OF STAY: 3d 14h  ACTUAL LENGTH OF STAY:          7                 Joel Barbosa MD

## 2017-10-23 NOTE — PROGRESS NOTES
[address]  [address], [address]       GI PROGRESS NOTE  Will Tammie Goodwin  [phone] office  [phone] NP/PA in-hospital cell phone M-F until 4:30PM  After 5PM or on weekends, please call  for physician on call      NAME: Dhara Simons   :  1975   MRN:  [id number]       Subjective:   Patient is requesting to have steroids increased. Pain medication has been decreased. She has some complaints of abdominal pain. No nausea or vomiting. She is tolerating a mechanical soft diet with no change in her symptoms. Objective:     VITALS:   Last 24hrs VS reviewed since prior progress note. Most recent are:  Visit Vitals    /71 (BP 1 Location: Left arm, BP Patient Position: At rest;Supine)    Pulse 98    Temp 98.2 °F (36.8 °C)    Resp 18    Ht 5' 4\" (1.626 m)    Wt 145 kg (319 lb 10.7 oz)    SpO2 99%    BMI 54.87 kg/m2       PHYSICAL EXAM:  General: Cooperative, no acute distress    Neurologic:  Alert and oriented X 3. HEENT: EOMI, no scleral icterus   Lungs:  CTA bilaterally. No wheezing  Heart:  S1 S2, regular rhythm, no murmur   Abdomen: Soft, obese, non-distended, diffuse tenderness to palpation. +Bowel sounds. Fistula with dressing in place and draining. LLQ ostomy with no output. Extremities: No edema  Psych:   Good insight. Anxious.      Lab Data Reviewed:     Recent Results (from the past 24 hour(s))   GLUCOSE, POC    Collection Time: 10/22/17 11:56 AM   Result Value Ref Range    Glucose (POC) 142 (H) 65 - 100 mg/dL    Performed by Zoondy, POC    Collection Time: 10/22/17  6:14 PM   Result Value Ref Range    Glucose (POC) 112 (H) 65 - 100 mg/dL    Performed by Zoondy, POC    Collection Time: 10/22/17  9:49 PM   Result Value Ref Range    Glucose (POC) 190 (H) 65 - 100 mg/dL    Performed by [address] [place] [address], POC    Collection Time: 10/22/17 11:48 PM   Result Value Ref Range    Glucose (POC) 152 (H) 65 - 100 mg/dL    Performed by Romy [facility], BASIC    Collection Time: 10/23/17 12:03 AM   Result Value Ref Range    Sodium 137 136 - 145 mmol/L    Potassium 3.5 3.5 - 5.1 mmol/L    Chloride 108 97 - 108 mmol/L    CO2 23 21 - 32 mmol/L    Anion gap 6 5 - 15 mmol/L    Glucose 154 (H) 65 - 100 mg/dL    BUN 27 (H) 6 - 20 MG/DL    Creatinine 0.69 0.55 - 1.02 MG/DL    BUN/Creatinine ratio 39 (H) 12 - 20      GFR est AA >60 >60 ml/min/1.73m2    GFR est non-AA >60 >60 ml/min/1.73m2    Calcium 8.5 8.5 - 10.1 MG/DL   MAGNESIUM    Collection Time: 10/23/17 12:03 AM   Result Value Ref Range    Magnesium 1.4 (L) 1.6 - 2.4 mg/dL   CBC W/O DIFF    Collection Time: 10/23/17 12:03 AM   Result Value Ref Range    WBC 18.9 (H) 3.6 - 11.0 K/uL    RBC 3.27 (L) 3.80 - 5.20 M/uL    HGB 9.2 (L) 11.5 - 16.0 g/dL    HCT 28.0 (L) 35.0 - 47.0 %    MCV 85.6 80.0 - 99.0 FL    MCH 28.1 26.0 - 34.0 PG    MCHC 32.9 30.0 - 36.5 g/dL    RDW 16.8 (H) 11.5 - 14.5 %    PLATELET 791 068 - 992 K/uL   PHOSPHORUS    Collection Time: 10/23/17 12:03 AM   Result Value Ref Range    Phosphorus 2.3 (L) 2.6 - 4.7 MG/DL   GLUCOSE, POC    Collection Time: 10/23/17  5:48 AM   Result Value Ref Range    Glucose (POC) 171 (H) 65 - 100 mg/dL    Performed by Juan Celis             Assessment:   · Crohn's Disease: multiple abdominal surgeries; EC fistula    · Acute on chronic abdominal pain: Surgery evaluated with no immediate surgical plans for fistula. Tentative plans to repair fistula in January.       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 She is requesting a higher dose of oral steroids, which I am apprehensive to order. I will discuss her case with her outpatient GI physician to help decide on plan for her. Cain Bae MD

## 2017-10-23 NOTE — CONSULTS
Palliative Medicine Consult  [place]: [phone] ([phone])    Patient Name: Nichelle Sims  YOB: 1975    Date of Initial Consult: 10/16/17  Reason for Consult: pain management  Requesting Provider: Dr. Ana Raza  Primary Care Physician: Eliezer Vasques MD      SUMMARY:   Nichelle Sims is a 46 y.o. with a past history of Crohn's disease, multiple surgeries s/p total colectomy with end ileostomy, 6/7/17 exp lap w SB perf, 6/28/17 exp lap, lysis of adhesions, repair of enterotomy, SMA stent on L, 7/16/17 exp lap, lysis of adhesions, fistula exclusion with feeding tube placement, frozen abdomen/ wound vac assisted closure placement, prolonged TPN, MRSA wound infection, awaiting repair of EC fistula planned for Jan 2018 if stable for surgery (an none of her home Crohn's meds until after healed) who was admitted on 10/15/2017 from [facility] with a diagnosis of acute on chronic abdominal pain. CT abd/pelvis done. Thought to be from Crohn's flare and GI started on steroids. Has not been on her home Crohn's medication since earlier this year due to recurrent infections. Current medical issues leading to Palliative Medicine involvement include: pain management. Patient's mother, Mary Antoine is NOK   PALLIATIVE DIAGNOSES:   1. Acute on chronic abdominal pain ? Reason for increased pain. 2. Crohn's disease ? Flare  3. Chronic anxiety and depression, chronic benzo use  4. Debility, generalized muscle wasting  5. Nausea- multifactorial       PLAN:   1. Pain -  Thought to be Crohn's flare, GI started on steroids. Tried Fentanyl patch to 100mcg here w/out any benefit. 2. No changes to pain regimen today, although pt aware will need to cont to wean IV medications slowly (either here or at [facility]). 3. Patient to discuss incr in steroids w/ GI today. 4. Cont regimen as below:   1. MSContin 60mg bid and 100mg bedtime (pain worse at night)  2. Morphine IR tablets 30mg every 3h prn.  Pls try to use this before the IV Dilaudid (which I will continue) and do not give within 30 min of each other. 3. Continue Dilaudid PCA NO basal rate, with 0.2 mg Q8min   (she used 5.4 mg in past 8h)  4. Dilaudid 1mg IV every 4hr PRN (instead of Q3)  pain not controlled by PCA. 5. Pt sees Dr Carol Min ([facility]) for chronic pain management. 5. Anxiety - patient chronically taking klonopin twice daily. 6. Continue Klonipin now at 0.5mg bid. 7. Communicated plan of care with: Palliative IDT       GOALS OF CARE / TREATMENT PREFERENCES:   [====Goals of Care====]  GOALS OF CARE:  Patient / health care proxy stated goals: (pain management)  Goals not reviewed today        TREATMENT PREFERENCES:   Code Status: Full Code    Advance Care Planning:  Advance Care Planning 10/16/2017   Patient's Healthcare Decision Maker is: Legal Next of Camilo 71   Primary Decision Maker Name Toñito Melchor   Primary Decision Maker Phone Number [phone]   Primary Decision Maker Relationship to Patient Parent   Confirm Advance Directive None   Patient Would Like to Complete Advance Directive -       Other:    The palliative care team has discussed with patient / health care proxy about goals of care / treatment preferences for patient.  [====Goals of Care====]         HISTORY:     HPI/SUBJECTIVE:    The patient is:   [x] Verbal and participatory  [] Non-participatory due to:   46year old female who is known to our service from previous admission. She reports one day of acute change in abdominal pain. She had been doing \"ok\" and was planning on seeing her pain management doctor on Wednesday of this week for recommendations about weaning. She doesn't think Veronicaherminio Aviles had changed anything recently with her pain meds. She was on 60 Q8 of MS contin, morphine PCA (1mg for a while, most recently 0.5mg PCA dose) and PRN dilaudid 1mg IV .   At one point they tried getting her off the PCA, but she didn't tolerate that well because it was too many pills for her to digest, so PCA had been restarted. 10/23: Pt states that since splitting dose of steroids to bid (Prednisone 20mg bid), her pain is a bit better but still requiring IV medications frequently. Meds for abd pain incl  Prednisone 20mg bid   Mscontin 60mg bid and 100mg bedtime  TPN and GI lite diet  Dilaudid PCA 0.2mg every 8 min demand- used 5.4mg in past 8h   Morphine 30mg every 3h prn - around the clock  Dilaudid 1mg IV very 4h prn - around the clock - spaced out more on 10/20    Clinical Pain Assessment (nonverbal scale for severity on nonverbal patients):   [++++ Clinical Pain Assessment++++]  [++++Pain Severity++++]: Pain: 7  [++++Pain Character++++]: stabbing  [++++Pain Duration++++]: Bad for the last 24-48 hours prior to admission   [++++Pain Effect++++]: nauseated  [++++Pain Factors++++]:   [++++Pain Frequency++++]: constant  [++++Pain Location++++]: all over abdomen  [++++ Clinical Pain Assessment++++]  Duration: for how long has pt been experiencing pain (e.g., 2 days, 1 month, years)  Frequency: how often pain is an issue (e.g., several times per day, once every few days, constant)     FUNCTIONAL ASSESSMENT:     Palliative Performance Scale (PPS):  PPS: 40       PSYCHOSOCIAL/SPIRITUAL SCREENING:     Advance Care Planning:  Advance Care Planning 10/16/2017   Patient's Healthcare Decision Maker is: Legal Next of Camilo [address]   Primary Decision Maker Name Robbin Walters   [place] [address] [address] [facility] Phone Number [phone]   Primary Decision Maker Relationship to Patient Parent   Confirm Advance Directive None   Patient Would Like to Complete Advance Directive -        Any spiritual / Orthodoxy concerns:  [] Yes /  [x] No    Caregiver Burnout:  [] Yes /  [x] No /  [] No Caregiver Present      Anticipatory grief assessment:   [x] Normal  / [] Maladaptive       ESAS Anxiety: Anxiety: 5    ESAS Depression: Depression: 5        REVIEW OF SYSTEMS:     Positive and pertinent negative findings in ROS are noted above in HPI.   The following systems were [x] reviewed / [] unable to be reviewed as noted in HPI  Other findings are noted below. Systems: constitutional, ears/nose/mouth/throat, respiratory, gastrointestinal, genitourinary, musculoskeletal, integumentary, neurologic, psychiatric, endocrine. Positive findings noted below. Modified ESAS Completed by: provider   Fatigue: 6 Drowsiness: 0   Depression: 5 Pain: 7   Anxiety: 5 Nausea: 3   Anorexia: 3 Dyspnea: 0     Constipation: No              PHYSICAL EXAM:     From RN flowsheet:  Wt Readings from Last 3 Encounters:   10/22/17 319 lb 10.7 oz (145 kg)   10/03/17 338 lb (153.3 kg)   09/12/17 338 lb (153.3 kg)     Blood pressure 127/71, pulse 98, temperature 98.2 °F (36.8 °C), resp. rate 18, height 5' 4\" (1.626 m), weight 319 lb 10.7 oz (145 kg), SpO2 99 %. Pain Scale 1: Numeric (0 - 10)  Pain Intensity 1: 7  Pain Onset 1: chronic  Pain Location 1: Abdomen  Pain Orientation 1: Mid  Pain Description 1: Stabbing  Pain Intervention(s) 1: Medication (see MAR)  Last bowel movement, if known: today     Constitutional: pt is awake, alert, chronically ill appearing, anxious   Eyes: Clear  ENT: moist mucous membranes, nares patent  Chest: Regular rhythm   Resp: CTA, unlabored  Abd: EC fistula, +BS. TTP.    Msk: No abnormalities  Neuro: moving all extremities   Psych: anxious, but engaging            HISTORY:     Principal Problem:    SIRS (systemic inflammatory response syndrome) (HCC) (10/16/2017)    Active Problems:    Abdominal pain (6/25/2017)      Acute hyponatremia (10/16/2017)      Leukocytosis (10/16/2017)      Wound, open, anterior abdominal wall (10/16/2017)      DEAN (acute kidney injury) ([address] (10/16/2017)      Past Medical History:   Diagnosis Date    Crohn's disease ([address] 8/15/2011    DVT (deep venous thrombosis) ([address] 8/15/2011    Incarcerated ventral hernia 8/15/2011    Right flank pain 8/22/2011    Seizures ([address]     Stroke [facility])       Past Surgical History:   Procedure Laterality Date    HX OTHER SURGICAL      multiple procedures related to her Crohn's disease    TN LAP, INCISIONAL HERNIA REPAIR,INCARCERATED  9-10-08    dr. Kiara Gamez      Family History   Problem Relation Age of Onset    Hypertension Father     Stroke Father     Other Father      arthritis      History reviewed, no pertinent family history.   Social History   Substance Use Topics    Smoking status: Former Smoker    Smokeless tobacco: Not on file    Alcohol use No     Allergies   Allergen Reactions    Demerol [Meperidine] Unknown (comments)    Soma [Carisoprodol] Rash and Nausea Only    Sulfa (Sulfonamide Antibiotics) Unknown (comments)    Toradol [Ketorolac Tromethamine] Unknown (comments)      Current Facility-Administered Medications   Medication Dose Route Frequency    potassium, sodium phosphates (NEUTRA-PHOS) packet 1 Packet  1 Packet Oral QID    ondansetron (ZOFRAN) injection 4 mg  4 mg IntraVENous Q6H PRN    predniSONE (DELTASONE) tablet 20 mg  20 mg Oral BID WITH MEALS    HYDROmorphone (PF) (DILAUDID) injection 1 mg  1 mg IntraVENous Q4H PRN    sodium chloride (NS) flush 20 mL  20 mL InterCATHeter PRN    sodium chloride (NS) flush 10 mL  10 mL InterCATHeter Q24H    sodium chloride (NS) flush 10 mL  10 mL InterCATHeter PRN    sodium chloride (NS) flush 10 mL  10 mL InterCATHeter Q8H    alteplase (CATHFLO) 1 mg in sterile water (preservative free) 1 mL injection  1 mg InterCATHeter PRN    bacitracin 500 unit/gram packet 1 Packet  1 Packet Topical PRN    insulin lispro (HUMALOG) injection   SubCUTAneous Q6H    enoxaparin (LOVENOX) injection 40 mg  40 mg SubCUTAneous Q24H    morphine CR (MS CONTIN) tablet 60 mg  60 mg Oral BID    morphine CR (MS CONTIN) tablet 100 mg  100 mg Oral QHS    morphine IR (MS IR) tablet 30 mg  30 mg Oral Q3H PRN    acetaminophen (TYLENOL) tablet 650 mg  650 mg Oral Q6H PRN    glucose chewable tablet 16 g  4 Tab Oral PRN    dextrose (D50W) injection syrg 12.5-25 g  12.5-25 g IntraVENous PRN    glucagon (GLUCAGEN) injection 1 mg  1 mg IntraMUSCular PRN    fat emulsion 20% (LIPOSYN, INTRAlipid) infusion 500 mL  500 mL IntraVENous Q MON, WED & FRI    clonazePAM (KlonoPIN) tablet 0.5 mg  0.5 mg Oral BID    sodium chloride (NS) flush 5-10 mL  5-10 mL IntraVENous Q8H    sodium chloride (NS) flush 5-10 mL  5-10 mL IntraVENous PRN    HYDROmorphone (PF) 15 mg/30 ml (DILAUDID) PCA   IntraVENous TITRATE    sodium chloride (NS) flush 5-10 mL  5-10 mL IntraVENous PRN    famotidine (PF) (PEPCID) 20 mg in sodium chloride 0.9 % 10 mL injection  20 mg IntraVENous Q12H    sodium chloride (NS) flush 5-10 mL  5-10 mL IntraVENous PRN          LAB AND IMAGING FINDINGS:     Lab Results   Component Value Date/Time    WBC 18.9 10/23/2017 12:03 AM    HGB 9.2 10/23/2017 12:03 AM    PLATELET 087 11/15/5521 12:03 AM     Lab Results   Component Value Date/Time    Sodium 137 10/23/2017 12:03 AM    Potassium 3.5 10/23/2017 12:03 AM    Chloride 108 10/23/2017 12:03 AM    CO2 23 10/23/2017 12:03 AM    BUN 27 10/23/2017 12:03 AM    Creatinine 0.69 10/23/2017 12:03 AM    Calcium 8.5 10/23/2017 12:03 AM    Magnesium 1.4 10/23/2017 12:03 AM    Phosphorus 2.3 10/23/2017 12:03 AM      Lab Results   Component Value Date/Time    AST (SGOT) 23 10/18/2017 05:21 AM    Alk. phosphatase 199 10/18/2017 05:21 AM    Protein, total 7.3 10/18/2017 05:21 AM    Albumin 2.4 10/18/2017 05:21 AM    Globulin 4.9 10/18/2017 05:21 AM     No results found for: INR, PTMR, PTP, PT1, PT2, APTT   No results found for: IRON, FE, TIBC, IBCT, PSAT, FERR   No results found for: PH, PCO2, PO2  No components found for: GLPOC   No results found for: CPK, CKMB             Total time:   Counseling / coordination time, spent as noted above:   > 50% counseling / coordination?:     Prolonged service was provided for  []30 min   []75 min in face to face time in the presence of the patient, spent as noted above.   Time Start:   Time End:   Note: this can only be billed with 77626 (initial) or 66933 (follow up). If multiple start / stop times, list each separately.

## 2017-10-24 LAB
ANION GAP SERPL CALC-SCNC: 8 MMOL/L (ref 5–15)
BUN SERPL-MCNC: 24 MG/DL (ref 6–20)
BUN/CREAT SERPL: 39 (ref 12–20)
CALCIUM SERPL-MCNC: 8.8 MG/DL (ref 8.5–10.1)
CHLORIDE SERPL-SCNC: 105 MMOL/L (ref 97–108)
CO2 SERPL-SCNC: 23 MMOL/L (ref 21–32)
CREAT SERPL-MCNC: 0.61 MG/DL (ref 0.55–1.02)
GLUCOSE BLD STRIP.AUTO-MCNC: 136 MG/DL (ref 65–100)
GLUCOSE BLD STRIP.AUTO-MCNC: 142 MG/DL (ref 65–100)
GLUCOSE BLD STRIP.AUTO-MCNC: 202 MG/DL (ref 65–100)
GLUCOSE SERPL-MCNC: 168 MG/DL (ref 65–100)
MAGNESIUM SERPL-MCNC: 1.8 MG/DL (ref 1.6–2.4)
POTASSIUM SERPL-SCNC: 3.8 MMOL/L (ref 3.5–5.1)
SERVICE CMNT-IMP: ABNORMAL
SODIUM SERPL-SCNC: 136 MMOL/L (ref 136–145)

## 2017-10-24 PROCEDURE — 74011636637 HC RX REV CODE- 636/637: Performed by: HOSPITALIST

## 2017-10-24 PROCEDURE — 83735 ASSAY OF MAGNESIUM: CPT | Performed by: INTERNAL MEDICINE

## 2017-10-24 PROCEDURE — 74011250636 HC RX REV CODE- 250/636: Performed by: INTERNAL MEDICINE

## 2017-10-24 PROCEDURE — 97535 SELF CARE MNGMENT TRAINING: CPT

## 2017-10-24 PROCEDURE — 74011000250 HC RX REV CODE- 250: Performed by: NURSE PRACTITIONER

## 2017-10-24 PROCEDURE — 97530 THERAPEUTIC ACTIVITIES: CPT

## 2017-10-24 PROCEDURE — 82962 GLUCOSE BLOOD TEST: CPT

## 2017-10-24 PROCEDURE — 74011636637 HC RX REV CODE- 636/637: Performed by: INTERNAL MEDICINE

## 2017-10-24 PROCEDURE — 97161 PT EVAL LOW COMPLEX 20 MIN: CPT

## 2017-10-24 PROCEDURE — 74011250636 HC RX REV CODE- 250/636: Performed by: HOSPITALIST

## 2017-10-24 PROCEDURE — 77030010545

## 2017-10-24 PROCEDURE — 74011250637 HC RX REV CODE- 250/637: Performed by: HOSPITALIST

## 2017-10-24 PROCEDURE — 74011250637 HC RX REV CODE- 250/637: Performed by: PHYSICAL MEDICINE & REHABILITATION

## 2017-10-24 PROCEDURE — 74011000258 HC RX REV CODE- 258: Performed by: INTERNAL MEDICINE

## 2017-10-24 PROCEDURE — 36415 COLL VENOUS BLD VENIPUNCTURE: CPT | Performed by: HOSPITALIST

## 2017-10-24 PROCEDURE — 74011250636 HC RX REV CODE- 250/636: Performed by: PHYSICAL MEDICINE & REHABILITATION

## 2017-10-24 PROCEDURE — 80048 BASIC METABOLIC PNL TOTAL CA: CPT | Performed by: HOSPITALIST

## 2017-10-24 PROCEDURE — 97165 OT EVAL LOW COMPLEX 30 MIN: CPT

## 2017-10-24 PROCEDURE — 65270000029 HC RM PRIVATE

## 2017-10-24 PROCEDURE — 74011000250 HC RX REV CODE- 250: Performed by: INTERNAL MEDICINE

## 2017-10-24 PROCEDURE — 74011250637 HC RX REV CODE- 250/637: Performed by: INTERNAL MEDICINE

## 2017-10-24 RX ORDER — MORPHINE SULFATE 100 MG/1
100 TABLET, FILM COATED, EXTENDED RELEASE ORAL EVERY 8 HOURS
Status: DISCONTINUED | OUTPATIENT
Start: 2017-10-24 | End: 2017-10-27 | Stop reason: HOSPADM

## 2017-10-24 RX ORDER — HYDROMORPHONE HYDROCHLORIDE 1 MG/ML
0.5 INJECTION, SOLUTION INTRAMUSCULAR; INTRAVENOUS; SUBCUTANEOUS
Status: DISCONTINUED | OUTPATIENT
Start: 2017-10-24 | End: 2017-10-27 | Stop reason: HOSPADM

## 2017-10-24 RX ADMIN — PREDNISONE 20 MG: 10 TABLET ORAL at 16:47

## 2017-10-24 RX ADMIN — Medication: at 01:03

## 2017-10-24 RX ADMIN — HYDROMORPHONE HYDROCHLORIDE 0.5 MG: 1 INJECTION, SOLUTION INTRAMUSCULAR; INTRAVENOUS; SUBCUTANEOUS at 13:16

## 2017-10-24 RX ADMIN — MORPHINE SULFATE 60 MG: 60 TABLET, FILM COATED, EXTENDED RELEASE ORAL at 09:08

## 2017-10-24 RX ADMIN — Medication 10 ML: at 16:48

## 2017-10-24 RX ADMIN — PREDNISONE 20 MG: 10 TABLET ORAL at 07:08

## 2017-10-24 RX ADMIN — MORPHINE SULFATE 30 MG: 15 TABLET ORAL at 23:41

## 2017-10-24 RX ADMIN — INSULIN LISPRO 3 UNITS: 100 INJECTION, SOLUTION INTRAVENOUS; SUBCUTANEOUS at 23:41

## 2017-10-24 RX ADMIN — MORPHINE SULFATE 30 MG: 15 TABLET ORAL at 15:21

## 2017-10-24 RX ADMIN — INSULIN LISPRO 3 UNITS: 100 INJECTION, SOLUTION INTRAVENOUS; SUBCUTANEOUS at 00:07

## 2017-10-24 RX ADMIN — CLONAZEPAM 0.5 MG: 0.5 TABLET ORAL at 22:05

## 2017-10-24 RX ADMIN — MORPHINE SULFATE 30 MG: 15 TABLET ORAL at 19:46

## 2017-10-24 RX ADMIN — MORPHINE SULFATE 100 MG: 100 TABLET, EXTENDED RELEASE ORAL at 22:05

## 2017-10-24 RX ADMIN — HYDROMORPHONE HYDROCHLORIDE 1 MG: 1 INJECTION, SOLUTION INTRAMUSCULAR; INTRAVENOUS; SUBCUTANEOUS at 05:06

## 2017-10-24 RX ADMIN — MORPHINE SULFATE 30 MG: 15 TABLET ORAL at 10:53

## 2017-10-24 RX ADMIN — ONDANSETRON 4 MG: 2 INJECTION INTRAMUSCULAR; INTRAVENOUS at 05:57

## 2017-10-24 RX ADMIN — Medication 10 ML: at 05:06

## 2017-10-24 RX ADMIN — MORPHINE SULFATE 30 MG: 15 TABLET ORAL at 02:28

## 2017-10-24 RX ADMIN — HYDROMORPHONE HYDROCHLORIDE 0.5 MG: 1 INJECTION, SOLUTION INTRAMUSCULAR; INTRAVENOUS; SUBCUTANEOUS at 21:37

## 2017-10-24 RX ADMIN — Medication 10 ML: at 21:38

## 2017-10-24 RX ADMIN — INSULIN LISPRO 2 UNITS: 100 INJECTION, SOLUTION INTRAVENOUS; SUBCUTANEOUS at 12:18

## 2017-10-24 RX ADMIN — POTASSIUM & SODIUM PHOSPHATES POWDER PACK 280-160-250 MG 1 PACKET: 280-160-250 PACK at 22:05

## 2017-10-24 RX ADMIN — POTASSIUM & SODIUM PHOSPHATES POWDER PACK 280-160-250 MG 1 PACKET: 280-160-250 PACK at 09:07

## 2017-10-24 RX ADMIN — POTASSIUM & SODIUM PHOSPHATES POWDER PACK 280-160-250 MG 1 PACKET: 280-160-250 PACK at 12:17

## 2017-10-24 RX ADMIN — Medication 10 ML: at 10:54

## 2017-10-24 RX ADMIN — CLONAZEPAM 0.5 MG: 0.5 TABLET ORAL at 09:08

## 2017-10-24 RX ADMIN — FAMOTIDINE 20 MG: 10 INJECTION, SOLUTION INTRAVENOUS at 21:38

## 2017-10-24 RX ADMIN — HYDROMORPHONE HYDROCHLORIDE 0.5 MG: 1 INJECTION, SOLUTION INTRAMUSCULAR; INTRAVENOUS; SUBCUTANEOUS at 17:26

## 2017-10-24 RX ADMIN — MORPHINE SULFATE 30 MG: 15 TABLET ORAL at 05:55

## 2017-10-24 RX ADMIN — FAMOTIDINE 20 MG: 10 INJECTION, SOLUTION INTRAVENOUS at 09:08

## 2017-10-24 RX ADMIN — ASCORBIC ACID, VITAMIN A PALMITATE, CHOLECALCIFEROL, THIAMINE HYDROCHLORIDE, RIBOFLAVIN-5 PHOSPHATE SODIUM, PYRIDOXINE HYDROCHLORIDE, NIACINAMIDE, DEXPANTHENOL, ALPHA-TOCOPHEROL ACETATE, VITAMIN K1, FOLIC ACID, BIOTIN, CYANOCOBALAMIN: 200; 3300; 200; 6; 3.6; 6; 40; 15; 10; 150; 600; 60; 5 INJECTION, SOLUTION INTRAVENOUS at 18:37

## 2017-10-24 RX ADMIN — POTASSIUM & SODIUM PHOSPHATES POWDER PACK 280-160-250 MG 1 PACKET: 280-160-250 PACK at 18:33

## 2017-10-24 RX ADMIN — INSULIN LISPRO 2 UNITS: 100 INJECTION, SOLUTION INTRAVENOUS; SUBCUTANEOUS at 07:08

## 2017-10-24 RX ADMIN — HYDROMORPHONE HYDROCHLORIDE 1 MG: 1 INJECTION, SOLUTION INTRAMUSCULAR; INTRAVENOUS; SUBCUTANEOUS at 00:52

## 2017-10-24 RX ADMIN — ENOXAPARIN SODIUM 40 MG: 40 INJECTION SUBCUTANEOUS at 16:48

## 2017-10-24 RX ADMIN — ONDANSETRON 4 MG: 2 INJECTION INTRAMUSCULAR; INTRAVENOUS at 18:33

## 2017-10-24 RX ADMIN — HYDROMORPHONE HYDROCHLORIDE 1 MG: 1 INJECTION, SOLUTION INTRAMUSCULAR; INTRAVENOUS; SUBCUTANEOUS at 09:03

## 2017-10-24 RX ADMIN — MORPHINE SULFATE 100 MG: 100 TABLET, EXTENDED RELEASE ORAL at 16:47

## 2017-10-24 NOTE — PROGRESS NOTES
Hospitalist Progress Note  Kennedy Durán MD  Answering service: [phone] from in house phone      Date of Service:  10/24/2017  NAME:  Graham Truong  :  1975  MRN:  [id number]      Admission Summary:   47 yo woman with Crohn disease, h/o DVT, incarcerated ventral hernia, chronic abdominal pain on Dilaudid PCA, morbid obesity, seizures, stroke and multiple repeat abdominal surgeries was BIBEMS to the ED from [place] [address] on 10/15/17 with abdominal pain, nausea/vomiting. She was admitted with SIRS and intractable abdominal pain. Interval history / Subjective:   Still c/o usual abdominal pain today but no n/v and able to hold down po intake; was about to work with PT/OT     Assessment & Plan:     Acute on chronic abdominal pain (POA)   - the etiology is unclear. This may be a Crohn's flare vs worsening of chronic pain  - CT abdomen/pelvis 10/15 large anterior abdominal wound, and a site of previous/known enterocutaneous fistula though this is difficult to assess given lack of oral and IV contrastmaterial. Mild stranding around the anterior abdominal wound as well as in the abdominal mesentery. Nonspecific wall thickening/inflammatory appearance of the duodenum.  - Palliative care following: currently on Dilaudid PCA which is being weaned (was on this at [facility]), fentanyl patch changed to ER morphine, IR PO liquid morphine PRN, IV Dilaudid PRN. 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  Wound care following.     Systemic inflammatory response syndrome (POA)   - likely due to Crohn's, intraabdominal infection possible  - BCx 10/15 negative  - s/p levofloxacin 10/15-10/17, vancomycin 10/15-10/18, Zosyn 10/15-10/20     Hyponatremia - resolved with IVF      Hypokalemia, hypomagnesemia, hypophosphatemia - replete prn     Hypercalcemia - mild, likely due to TPN; improved after adjusting TPN     Hyperglycemia - due to TPN; increased insulin in TPN to 15 units/L with better glycemic control     DEAN - pre-renal, resolved with IV fluids     Chronic pain syndrome - Palliative care following and managing pain; still on Dilaudid PCA     Morbid obesity, Body mass index is 54.27 kg/(m^2). Code status: Full  DVT prophylaxis: enoxaparin SC    Care Plan discussed with: Patient/Family, Nurse and   Disposition: Came from [facility] but wants to go home; cleared by PT/OT for home so need to set up [facility] for TPN, PCA, wound care     Hospital Problems  Date Reviewed: 10/16/2017          Codes Class Noted POA    Acute hyponatremia ICD-10-CM: E87.1  ICD-9-CM: 276.1  10/16/2017 Unknown        Leukocytosis ICD-10-CM: D72.829  ICD-9-CM: 288.60  10/16/2017 Unknown        Wound, open, anterior abdominal wall ICD-10-CM: S31.109A  ICD-9-CM: 879.2  10/16/2017 Unknown        * (Principal)SIRS (systemic inflammatory response syndrome) ([facility] ICD-10-CM: R65.10  ICD-9-CM: 995.90  10/16/2017 Unknown        DEAN (acute kidney injury) ([facility] ICD-10-CM: N17.9  ICD-9-CM: 584.9  10/16/2017 Unknown        Abdominal pain ICD-10-CM: R10.9  ICD-9-CM: 789.00  6/25/2017 Unknown            Review of Systems:   Pertinent items are noted in HPI. Vital Signs:    Last 24hrs VS reviewed since prior progress note.  Most recent are:  Visit Vitals    /77 (BP 1 Location: Left arm, BP Patient Position: At rest)    Pulse 81    Temp 98.1 °F (36.7 °C)    Resp 16    Ht 5' 4\" (1.626 m)    Wt 143.4 kg (316 lb 2.2 oz)    SpO2 98%    BMI 54.27 kg/m2       Intake/Output Summary (Last 24 hours) at 10/24/17 1534  Last data filed at 10/24/17 1324   Gross per 24 hour   Intake             1000 ml   Output             4170 ml   Net            -3170 ml      Physical Examination:     Constitutional:  awake, no acute distress, cooperative, obese   ENT:  oral mucosa moist, oropharynx benign  Neck supple, no masses   Resp:  CTA bilaterally, no wheezing/rhonchi/rales   CV:  regular rhythm, normal rate, no m/r/g appreciated    GI:  +BS, soft, non distended, non tender, obese, ostomies x2     Musculoskeletal:  moves all extremities    Neurologic:  AAOx3, NFD     Skin:  multiple ostomies, multiple tattoos  Eyes:  PERRL    Data Review:    Review and/or order of clinical lab test  Review and/or order of tests in the radiology section of CPT  Review and/or order of tests in the medicine section of CPT    Labs:     Recent Labs      10/23/17   0003   WBC  18.9*   HGB  9.2*   HCT  28.0*   PLT  256     Recent Labs      10/24/17   0549  10/23/17   0003  10/22/17   0209   NA  136  137  136   K  3.8  3.5  3.9   CL  105  108  109*   CO2  23  23  20*   BUN  24*  27*  31*   CREA  0.61  0.69  0.79   GLU  168*  154*  177*   CA  8.8  8.5  9.4   MG  1.8  1.4*  1.7   PHOS   --   2.3*   --      No results for input(s): SGOT, GPT, ALT, AP, TBIL, TBILI, TP, ALB, GLOB, GGT, AML, LPSE in the last 72 hours. No lab exists for component: AMYP, HLPSE  No results for input(s): INR, PTP, APTT in the last 72 hours. No lab exists for component: INREXT   No results for input(s): FE, TIBC, PSAT, FERR in the last 72 hours. No results found for: FOL, RBCF   No results for input(s): PH, PCO2, PO2 in the last 72 hours. No results for input(s): CPK, CKNDX, TROIQ in the last 72 hours.     No lab exists for component: CPKMB  No results found for: CHOL, CHOLX, CHLST, CHOLV, HDL, LDL, LDLC, DLDLP, TGLX, TRIGL, TRIGP, CHHD, CHHDX  Lab Results   Component Value Date/Time    Glucose (POC) 142 10/24/2017 11:42 AM    Glucose (POC) 209 10/23/2017 11:53 PM    Glucose (POC) 158 10/23/2017 10:29 PM Glucose (POC) 115 10/23/2017 06:15 PM    Glucose (POC) 155 10/23/2017 11:51 AM     Lab Results   Component Value Date/Time    Color YELLOW/STRAW 10/15/2017 11:02 PM    Appearance CLOUDY 10/15/2017 11:02 PM    Specific gravity 1.019 10/15/2017 11:02 PM    Specific gravity 1.020 07/07/2009 07:20 PM    pH (UA) 5.5 10/15/2017 11:02 PM    Protein TRACE 10/15/2017 11:02 PM    Glucose NEGATIVE  10/15/2017 11:02 PM    Ketone NEGATIVE  10/15/2017 11:02 PM    Bilirubin NEGATIVE  10/15/2017 11:02 PM    Urobilinogen 0.2 10/15/2017 11:02 PM    Nitrites NEGATIVE  10/15/2017 11:02 PM    Leukocyte Esterase MODERATE 10/15/2017 11:02 PM    Epithelial cells FEW 10/15/2017 11:02 PM    Bacteria NEGATIVE  10/15/2017 11:02 PM    WBC 5-10 10/15/2017 11:02 PM    RBC 5-10 10/15/2017 11:02 PM     Medications Reviewed:     Current Facility-Administered Medications   Medication Dose Route Frequency    TPN ADULT - CENTRAL   IntraVENous CONTINUOUS    morphine CR (MS CONTIN) tablet 100 mg  100 mg Oral Q8H    HYDROmorphone (PF) (DILAUDID) injection 0.5 mg  0.5 mg IntraVENous Q4H PRN    potassium, sodium phosphates (NEUTRA-PHOS) packet 1 Packet  1 Packet Oral QID    sodium chloride 0.9 % bolus infusion 250 mL  250 mL IntraVENous PRN    ondansetron (ZOFRAN) injection 4 mg  4 mg IntraVENous Q6H PRN    predniSONE (DELTASONE) tablet 20 mg  20 mg Oral BID WITH MEALS    sodium chloride (NS) flush 20 mL  20 mL InterCATHeter PRN    sodium chloride (NS) flush 10 mL  10 mL InterCATHeter Q24H    sodium chloride (NS) flush 10 mL  10 mL InterCATHeter PRN    sodium chloride (NS) flush 10 mL  10 mL InterCATHeter Q8H    alteplase (CATHFLO) 1 mg in sterile water (preservative free) 1 mL injection  1 mg InterCATHeter PRN    bacitracin 500 unit/gram packet 1 Packet  1 Packet Topical PRN    insulin lispro (HUMALOG) injection   SubCUTAneous Q6H    enoxaparin (LOVENOX) injection 40 mg  40 mg SubCUTAneous Q24H    morphine IR (MS IR) tablet 30 mg  30 mg Oral Q3H PRN    acetaminophen (TYLENOL) tablet 650 mg  650 mg Oral Q6H PRN    glucose chewable tablet 16 g  4 Tab Oral PRN    dextrose (D50W) injection syrg 12.5-25 g  12.5-25 g IntraVENous PRN    glucagon (GLUCAGEN) injection 1 mg  1 mg IntraMUSCular PRN    fat emulsion 20% (LIPOSYN, INTRAlipid) infusion 500 mL  500 mL IntraVENous Q MON, WED & FRI    clonazePAM (KlonoPIN) tablet 0.5 mg  0.5 mg Oral BID    sodium chloride (NS) flush 5-10 mL  5-10 mL IntraVENous Q8H    sodium chloride (NS) flush 5-10 mL  5-10 mL IntraVENous PRN    HYDROmorphone (PF) 15 mg/30 ml (DILAUDID) PCA   IntraVENous TITRATE    sodium chloride (NS) flush 5-10 mL  5-10 mL IntraVENous PRN    famotidine (PF) (PEPCID) 20 mg in sodium chloride 0.9 % 10 mL injection  20 mg IntraVENous Q12H    sodium chloride (NS) flush 5-10 mL  5-10 mL IntraVENous PRN     ______________________________________________________________________  EXPECTED LENGTH OF STAY: 3d 14h  ACTUAL LENGTH OF STAY:          8                 Miri Martel MD

## 2017-10-24 NOTE — PROGRESS NOTES
Range    Glucose (POC) 209 (H) 65 - 100 mg/dL    Performed by [facility]    METABOLIC PANEL, BASIC    Collection Time: 10/24/17  5:49 AM   Result Value Ref Range    Sodium 136 136 - 145 mmol/L    Potassium 3.8 3.5 - 5.1 mmol/L    Chloride 105 97 - 108 mmol/L    CO2 23 21 - 32 mmol/L    Anion gap 8 5 - 15 mmol/L    Glucose 168 (H) 65 - 100 mg/dL    BUN 24 (H) 6 - 20 MG/DL    Creatinine 0.61 0.55 - 1.02 MG/DL    BUN/Creatinine ratio 39 (H) 12 - 20      GFR est AA >60 >60 ml/min/1.73m2    GFR est non-AA >60 >60 ml/min/1.73m2    Calcium 8.8 8.5 - 10.1 MG/DL         Assessment:   · Crohn's Disease: multiple abdominal surgeries; EC fistula    · Acute on chronic abdominal pain: Surgery evaluated with no immediate surgical plans for fistula. Tentative plans to repair fistula in January. 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 Vicky Townsend. Signed By: Fariba Snyder [id number]     10/24/2017  8:35 AM       GI Attending: Patient seen and examined. She is tolerating PO. Please continue current regimen of prednisone 40 mg daily. Upon discharge, we will request that she see Dr. Vicky Townsend as soon as possible to discuss her outpatient Crohn's management regimen. We will sign off for now.  Please call with questions. Cain Garcia MD

## 2017-10-24 NOTE — WOUND CARE
Fistula pouch assessment: appliance in place without leaks managing output well; appliance to wall suction; canister full; switched out canister;  Provided patient with woodruff catheter drainage bag to hook up to when ambulating; discussed with charge nurse; primary nurse off the floor;   Will check on her tomorrow;     Pinky Kirkland RN

## 2017-10-24 NOTE — PROGRESS NOTES
Follow up visit with Jing Kebede. Pt shared that her father would be visiting soon and that she was looking forward to having some time with him. She provided an update on how she is feelings, sharing some of her hopes and concerns. Offered brief spoken prayer. Met pt's father when he arrived and offered emotional support. Kept visit brief to allow pt and father some time to spend together. Affirmed the ongoing availability of  support.     Bhumika Laura Head, Palliative

## 2017-10-24 NOTE — CONSULTS
Palliative Medicine Consult  [place]: [phone] ([phone])    Patient Name: Cat Hogue  YOB: 1975    Date of Initial Consult: 10/16/17  Reason for Consult: pain management  Requesting Provider: Dr. Tiffanie Bell  Primary Care Physician: Eliezer Vasques MD      SUMMARY:   Cat Hogue is a 46 y.o. with a past history of Crohn's disease (followed by Dr Allna Arredondo), multiple surgeries s/p total colectomy with end ileostomy, 6/7/17 exp lap w SB perf, 6/28/17 exp lap, lysis of adhesions, repair of enterotomy, SMA stent on L, 7/16/17 exp lap, lysis of adhesions, fistula exclusion with feeding tube placement, frozen abdomen/ wound vac assisted closure placement, prolonged TPN, MRSA wound infection, awaiting repair of EC fistula planned for Jan 2018 if stable for surgery (an none of her home Crohn's meds until after healed) who was admitted on 10/15/2017 from [facility] with a diagnosis of acute on chronic abdominal pain. CT abd/pelvis done. Thought to be from Crohn's flare and GI started on steroids. Has not been on her home Crohn's medication since earlier this year due to recurrent infections. Current medical issues leading to Palliative Medicine involvement include: pain management. Patient's mother, Aneesh Trotter is NOK   PALLIATIVE DIAGNOSES:   1. Acute on chronic abdominal pain  2. Crohn's disease ? Flare  3. Chronic anxiety and depression, chronic benzo use  4. Debility, generalized muscle wasting  5. Nausea- multifactorial       PLAN:   1. Pain -  Thought to be Crohn's flare, GI started on steroids- awaiting to see if primary GI feels safe to incr Prednisone further (now on 40mg daily). Tried Fentanyl patch to 100mcg here w/out any benefit. 2. Cont to encourage pt that she will improve- and I need to wean these pain medications hortencia because we are trying to avoid stay at [facility]. Pt is tearful but agreeable. 3. Awaiting to see if Dr Allan Arredondo feels is safe to incr steroids further.    4. Pain medication adjustments:   1. Incr MSContin to 100mg every 8h (from 60,60,100mg). See this as a temporary measure in order to wean off IV. 2. Morphine IR tablets 30mg every 3h prn. Pls try to use this before the IV Dilaudid (which I will continue) and do not give within 30 min of each other. 3. Continue Dilaudid PCA NO basal rate, with 0.2 mg every 8min,. 4. DECR Dilaudid IV dose today in half, so now at 0.5mg IV every 4h prn pain not controlled by po Morphine or PCA. 5. Pt sees Dr Lauryn Ha ([facility]) for chronic pain management. 5. Anxiety - patient chronically taking klonopin twice daily. 6. Continue Klonipin now at 0.5mg bid. 7. Communicated plan of care with: Palliative IDT; 6E staff        GOALS OF CARE / TREATMENT PREFERENCES:   [====Goals of Care====]  GOALS OF CARE:  Patient / health care proxy stated goals: (pain management)  Goals not reviewed today        TREATMENT PREFERENCES:   Code Status: Full Code    Advance Care Planning:  Advance Care Planning 10/16/2017   Patient's Healthcare Decision Maker is: Legal Next of Camilo 71   Primary Decision Maker Name Jennifer Pritchard   Primary Decision Maker Phone Number [phone]   Primary Decision Maker Relationship to Patient Parent   Confirm Advance Directive None   Patient Would Like to Complete Advance Directive -       Other:    The palliative care team has discussed with patient / health care proxy about goals of care / treatment preferences for patient.  [====Goals of Care====]         HISTORY:     HPI/SUBJECTIVE:    The patient is:   [x] Verbal and participatory  [] Non-participatory due to:   46year old female who is known to our service from previous admission. She reports one day of acute change in abdominal pain. She had been doing \"ok\" and was planning on seeing her pain management doctor on Wednesday of this week for recommendations about weaning. She doesn't think Matty Xiao had changed anything recently with her pain meds.   She was on 60 Q8 of MS contin, morphine PCA (1mg for a while, most recently 0.5mg PCA dose) and PRN dilaudid 1mg IV . At one point they tried getting her off the PCA, but she didn't tolerate that well because it was too many pills for her to digest, so PCA had been restarted. 10/24: Pt going to work w/ PT today,awaiting to see what her long time GI physician feels is safe regarding steroid increase. Agreeable to go down on IV Dilaudid dose but fearful. Meds for abd pain incl  Prednisone 20mg bid   Mscontin 60mg bid and 100mg bedtime  TPN and GI lite diet  Dilaudid PCA 0.2mg every 8 min demand  Morphine 30mg every 3h prn - around the clock  Dilaudid 1mg IV very 4h prn - around the clock - spaced out more on 10/20    Clinical Pain Assessment (nonverbal scale for severity on nonverbal patients):   [++++ Clinical Pain Assessment++++]  [++++Pain Severity++++]: Pain: 7  [++++Pain Character++++]: stabbing  [++++Pain Duration++++]:   Bad for the last 24-48 hours prior to admission   [++++Pain Effect++++]: nauseated  [++++Pain Factors++++]: worse w/ movement and wound care change   [++++Pain Frequency++++]: constant  [++++Pain Location++++]: all over abdomen  [++++ Clinical Pain Assessment++++]  Duration: for how long has pt been experiencing pain (e.g., 2 days, 1 month, years)  Frequency: how often pain is an issue (e.g., several times per day, once every few days, constant)     FUNCTIONAL ASSESSMENT:     Palliative Performance Scale (PPS):  PPS: 40       PSYCHOSOCIAL/SPIRITUAL SCREENING:     Advance Care Planning:  Advance Care Planning 10/16/2017   Patient's Healthcare Decision Maker is: Legal Next of Kin   Primary Decision Maker Name Coreen Perez   [facility]  Phone Number [phone]   Primary Decision Maker Relationship to Patient Parent   Confirm Advance Directive None   Patient Would Like to Complete Advance Directive -        Any spiritual / Yazidi concerns:  [] Yes /  [x] No    Caregiver Burnout:  [] Yes /  [x] No / [] No Caregiver Present      Anticipatory grief assessment:   [x] Normal  / [] Maladaptive       ESAS Anxiety: Anxiety: 5    ESAS Depression: Depression: 5        REVIEW OF SYSTEMS:     Positive and pertinent negative findings in ROS are noted above in HPI. The following systems were [x] reviewed / [] unable to be reviewed as noted in HPI  Other findings are noted below. Systems: constitutional, ears/nose/mouth/throat, respiratory, gastrointestinal, genitourinary, musculoskeletal, integumentary, neurologic, psychiatric, endocrine. Positive findings noted below. Modified ESAS Completed by: provider   Fatigue: 6 Drowsiness: 0   Depression: 5 Pain: 7   Anxiety: 5 Nausea: 3   Anorexia: 3 Dyspnea: 0     Constipation: No              PHYSICAL EXAM:     From RN flowsheet:  Wt Readings from Last 3 Encounters:   10/23/17 316 lb 2.2 oz (143.4 kg)   10/03/17 338 lb (153.3 kg)   09/12/17 338 lb (153.3 kg)     Blood pressure 115/70, pulse 96, temperature 98.1 °F (36.7 °C), resp. rate 16, height 5' 4\" (1.626 m), weight 316 lb 2.2 oz (143.4 kg), SpO2 100 %. Pain Scale 1: Numeric (0 - 10)  Pain Intensity 1: 8  Pain Onset 1: chronic  Pain Location 1: Abdomen  Pain Orientation 1: Mid  Pain Description 1: Stabbing  Pain Intervention(s) 1: Medication (see MAR)  Last bowel movement, if known: today     Constitutional: pt is awake, alert, chronically ill appearing, anxious   Eyes: Clear  ENT: moist mucous membranes, nares patent  Chest: Regular rhythm   Resp: CTA, unlabored  Abd: EC fistula, no leakage, +BS. TTP.    Msk: No abnormalities  Neuro: moving all extremities   Psych: anxious, but engaging            HISTORY:     Principal Problem:    SIRS (systemic inflammatory response syndrome) (HCC) (10/16/2017)    Active Problems:    Abdominal pain (6/25/2017)      Acute hyponatremia (10/16/2017)      Leukocytosis (10/16/2017)      Wound, open, anterior abdominal wall (10/16/2017)      DEAN (acute kidney injury) ([facility] [address] (10/16/2017)      Past Medical History:   Diagnosis Date    Crohn's disease ([facility] [address] 8/15/2011    DVT (deep venous thrombosis) ([facility] [address] 8/15/2011    Incarcerated ventral hernia 8/15/2011    Right flank pain 8/22/2011    Seizures ([facility] [address]     Stroke [facility])       Past Surgical History:   Procedure Laterality Date    HX OTHER SURGICAL      multiple procedures related to her Crohn's disease    [place] LAP, INCISIONAL HERNIA REPAIR,INCARCERATED  9-10-08    dr. Will Huertas      Family History   Problem Relation Age of Onset    Hypertension Father     Stroke Father     Other Father      arthritis      History reviewed, no pertinent family history.   Social History   Substance Use Topics    Smoking status: Former Smoker    Smokeless tobacco: Not on file    Alcohol use No     Allergies   Allergen Reactions    Demerol [Meperidine] Unknown (comments)    Soma [Carisoprodol] Rash and Nausea Only    Sulfa (Sulfonamide Antibiotics) Unknown (comments)    Toradol [Ketorolac Tromethamine] Unknown (comments)      Current Facility-Administered Medications   Medication Dose Route Frequency    TPN ADULT - CENTRAL   IntraVENous CONTINUOUS    morphine CR (MS CONTIN) tablet 100 mg  100 mg Oral Q8H    HYDROmorphone (PF) (DILAUDID) injection 0.5 mg  0.5 mg IntraVENous Q4H PRN    potassium, sodium phosphates (NEUTRA-PHOS) packet 1 Packet  1 Packet Oral QID    sodium chloride 0.9 % bolus infusion 250 mL  250 mL IntraVENous PRN    ondansetron (ZOFRAN) injection 4 mg  4 mg IntraVENous Q6H PRN    predniSONE (DELTASONE) tablet 20 mg  20 mg Oral BID WITH MEALS    sodium chloride (NS) flush 20 mL  20 mL InterCATHeter PRN    sodium chloride (NS) flush 10 mL  10 mL InterCATHeter Q24H    sodium chloride (NS) flush 10 mL  10 mL InterCATHeter PRN    sodium chloride (NS) flush 10 mL  10 mL InterCATHeter Q8H    alteplase (CATHFLO) 1 mg in sterile water (preservative free) 1 mL injection  1 mg InterCATHeter PRN    bacitracin 500 unit/gram packet 1 Packet  1 Packet Topical PRN    insulin lispro (HUMALOG) injection   SubCUTAneous Q6H    enoxaparin (LOVENOX) injection 40 mg  40 mg SubCUTAneous Q24H    morphine IR (MS IR) tablet 30 mg  30 mg Oral Q3H PRN    acetaminophen (TYLENOL) tablet 650 mg  650 mg Oral Q6H PRN    glucose chewable tablet 16 g  4 Tab Oral PRN    dextrose (D50W) injection syrg 12.5-25 g  12.5-25 g IntraVENous PRN    glucagon (GLUCAGEN) injection 1 mg  1 mg IntraMUSCular PRN    fat emulsion 20% (LIPOSYN, INTRAlipid) infusion 500 mL  500 mL IntraVENous Q MON, WED & FRI    clonazePAM (KlonoPIN) tablet 0.5 mg  0.5 mg Oral BID    sodium chloride (NS) flush 5-10 mL  5-10 mL IntraVENous Q8H    sodium chloride (NS) flush 5-10 mL  5-10 mL IntraVENous PRN    HYDROmorphone (PF) 15 mg/30 ml (DILAUDID) PCA   IntraVENous TITRATE    sodium chloride (NS) flush 5-10 mL  5-10 mL IntraVENous PRN    famotidine (PF) (PEPCID) 20 mg in sodium chloride 0.9 % 10 mL injection  20 mg IntraVENous Q12H    sodium chloride (NS) flush 5-10 mL  5-10 mL IntraVENous PRN          LAB AND IMAGING FINDINGS:     Lab Results   Component Value Date/Time    WBC 18.9 10/23/2017 12:03 AM    HGB 9.2 10/23/2017 12:03 AM    PLATELET 539 15/45/1496 12:03 AM     Lab Results   Component Value Date/Time    Sodium 136 10/24/2017 05:49 AM    Potassium 3.8 10/24/2017 05:49 AM    Chloride 105 10/24/2017 05:49 AM    CO2 23 10/24/2017 05:49 AM    BUN 24 10/24/2017 05:49 AM    Creatinine 0.61 10/24/2017 05:49 AM    Calcium 8.8 10/24/2017 05:49 AM    Magnesium 1.8 10/24/2017 05:49 AM    Phosphorus 2.3 10/23/2017 12:03 AM      Lab Results   Component Value Date/Time    AST (SGOT) 23 10/18/2017 05:21 AM    Alk.  phosphatase 199 10/18/2017 05:21 AM    Protein, total 7.3 10/18/2017 05:21 AM    Albumin 2.4 10/18/2017 05:21 AM    Globulin 4.9 10/18/2017 05:21 AM     No results found for: INR, PTMR, PTP, PT1, PT2, APTT   No results found for: IRON, FE, TIBC, IBCT, PSAT, FERR   No results found for: PH, PCO2, PO2  No components found for: GLPOC   No results found for: CPK, CKMB             Total time:   Counseling / coordination time, spent as noted above:   > 50% counseling / coordination?:     Prolonged service was provided for  []30 min   []75 min in face to face time in the presence of the patient, spent as noted above. Time Start:   Time End:   Note: this can only be billed with 72636 (initial) or 12597 (follow up). If multiple start / stop times, list each separately.

## 2017-10-24 NOTE — PROGRESS NOTES
1:44 PM  Patient reviewed in rounds. Disposition TBD pending PT/OT recommendations. Patient desires to transition home vs [facility]. GI following and Palliative Team following for pain management. Goal is to ween pain medications if patient desires to transition home. There are no current CM consults or needs at this time. CM will continue to follow and assist with disposition needs as they arise.     ROBERT Modi/TYLER

## 2017-10-24 NOTE — PROGRESS NOTES
Bedside shift change report given to Eric Patterson RN (oncoming nurse) by Bailee Payne RN (offgoing nurse). Report included the following information SBAR and Kardex.

## 2017-10-24 NOTE — PROGRESS NOTES
Problem: Mobility Impaired (Adult and Pediatric)  Goal: *Acute Goals and Plan of Care (Insert Text)  Physical Therapy Goals  Initiated 10/24/2017  1. Patient will move from supine to sit and sit to supine  in bed with modified independence within 7 day(s). 4.  Patient will ambulate with independence for 300 feet with the least restrictive device within 7 day(s). 5.  Patient will ascend/descend 2 stairs with 2 handrail(s) with supervision/set-up within 7 day(s). physical Therapy EVALUATION  Patient: Samm Schumacher (46 y.o. female)  Date: 10/24/2017  Primary Diagnosis: SIRS (systemic inflammatory response syndrome) (HCC)  Leukocytosis  Abdominal pain  DEAN (acute kidney injury) ([facility] [address]  Acute hyponatremia  Wound, open, anterior abdominal wall  Poor Venous Access  Procedure(s) (LRB):  INFUSION CATHETER INSERTION (N/A) 7 Days Post-Op   Precautions: contact       ASSESSMENT :  Based on the objective data described below, the patient presents with overall independent mobility after lengthy hospitalization. Today demonstrating good mobility out of bed walking  Within confines of room with suction to wall. Patient desires to return home at discharge vs [facility] and assessed today to see if she can manage mobility wise. Feel she is safe to go home but need to assess steps and extended walking once transition to bag for drain vs wall suction. Recommend up in room daily and nursing just for supervision to manage lines. .    Patient will benefit from skilled intervention to address the above impairments.   Patients rehabilitation potential is considered to be Excellent  Factors which may influence rehabilitation potential include:   []         None noted  []         Mental ability/status  [x]         Medical condition  []         Home/family situation and support systems  []         Safety awareness  []         Pain tolerance/management  []         Other:      PLAN :  Recommendations and Planned Interventions:  [x] Bed Mobility Training             []    Neuromuscular Re-Education  []           Transfer Training                   []    Orthotic/Prosthetic Training  [x]           Gait Training                         []    Modalities  [x]           Therapeutic Exercises           []    Edema Management/Control  [x]           Therapeutic Activities            [x]    Patient and Family Training/Education  []           Other (comment):    Frequency/Duration: Patient will be followed by physical therapy  1 time a week to address goals. Discharge Recommendations: None  Further Equipment Recommendations for Discharge: plans to get own equipment at discharge to include raised toilet seat, tub bench/chair, walker     SUBJECTIVE:   Patient stated I really want to go home.     OBJECTIVE DATA SUMMARY:   HISTORY:    Past Medical History:   Diagnosis Date    Crohn's disease ([facility] [address] 8/15/2011    DVT (deep venous thrombosis) ([facility] [address] 8/15/2011    Incarcerated ventral hernia 8/15/2011    Right flank pain 8/22/2011    Seizures ([facility] [address]     Stroke [facility])      Past Surgical History:   Procedure Laterality Date    HX OTHER SURGICAL      multiple procedures related to her Crohn's disease    [place] LAP, INCISIONAL HERNIA REPAIR,INCARCERATED  9-10-08    dr. Roland Lay     Prior Level of Function/[place] Situation[place]  [facility]  Personal factors and/or comorbidities impacting plan of care:     Home Situation  Home Environment: Private residence  [address] Name: Yevgeniy  # Steps to Enter: 3  Rails to Enter: Yes  Hand Rails : Bilateral  One/Two Story Residence: Two story, live on 1st floor  Living Alone: No  Support Systems: Family member(s), Friends \ neighbors  Patient Expects to be Discharged to[de-identified] Private residence  Current DME Used/Available at Home: None    EXAMINATION/PRESENTATION/DECISION MAKING:   Critical Behavior:  Neurologic State: Alert  Orientation Level: Oriented X4  Cognition: Follows commands     Hearing:   Auditory  Auditory Impairment: None  Skin:  See nursing notes    Range Of Motion:  AROM: Generally decreased, functional           PROM: Generally decreased, functional           Strength:    Strength: Within functional limits                    Tone & Sensation:   Tone: Normal              Sensation: Intact               Coordination:  Coordination: Within functional limits  Vision:      Functional Mobility:  Bed Mobility:     Supine to Sit: Modified independent     Scooting: Independent  Transfers:  Sit to Stand: Modified independent  Stand to Sit: Modified independent  Stand Pivot Transfers: Modified independent                    Balance:   Sitting: Intact  Standing: Intact  Ambulation/Gait Training:  Distance (ft): 30 Feet (ft)  Assistive Device: Gait belt  Ambulation - Level of Assistance: Modified independent     Gait Description (WDL): Exceptions to WDL           Base of Support: Widened        Step Length: Right shortened;Left shortened                       Therapeutic Exercises:   Patient doing home exercise program for LE's strengthening    Functional Measure:  Tinetti test:    Sitting Balance: 1  Arises: 1  Attempts to Rise: 2  Immediate Standing Balance: 2  Standing Balance: 1  Nudged: 2  Eyes Closed: 1  Turn 360 Degrees - Continuous/Discontinuous: 1  Turn 360 Degrees - Steady/Unsteady: 1  Sitting Down: 2  Balance Score: 14  Indication of Gait: 1  R Step Length/Height: 1  L Step Length/Height: 1  R Foot Clearance: 1  L Foot Clearance: 1  Step Symmetry: 1  Step Continuity: 1  Path: 2  Trunk: 2  Walking Time: 0  Gait Score: 11  Total Score: 25       Tinetti Test and G-code impairment scale:  Percentage of Impairment CH    0%   CI    1-19% CJ    20-39% CK    40-59% CL    60-79% CM    80-99% CN     100%   Tinetti  Score 0-28 28 23-27 17-22 12-16 6-11 1-5 0       Tinetti Tool Score Risk of Falls  <19 = High Fall Risk  19-24 = Moderate Fall Risk  25-28 = Low Fall Risk  Tinetti ME.  Performance-Oriented Assessment of Mobility Problems in Elderly Patients. [facility]; [id number]. (Scoring Description: PT Bulletin Feb. 10, 1993)    Older adults: Michael Huerta et al, 2009; n = 1000 [facility] elderly evaluated with ABC, FELIX, ADL, and IADL)  · Mean FELIX score for males aged 69-68 years = 26.21(3.40)  · Mean FELIX score for females age 69-68 years = 25.16(4.30)  · Mean FELIX score for males over 80 years = 23.29(6.02)  · Mean FELIX score for females over 80 years = 17.20(8.32)         G codes: In compliance with CMSs Claims Based Outcome Reporting, the following G-code set was chosen for this patient based on their primary functional limitation being treated: The outcome measure chosen to determine the severity of the functional limitation was the Tinetti with a score of 25/28 which was correlated with the impairment scale. ? Mobility - Walking and Moving Around:     - CURRENT STATUS: CI - 1%-19% impaired, limited or restricted    - GOAL STATUS: CH - 0% impaired, limited or restricted    - D/C STATUS:  ---------------To be determined---------------    Pain:  Pain Scale 1: Numeric (0 - 10)  Pain Intensity 1: 8      After treatment:   [x]         Patient left in no apparent distress sitting up in chair  []         Patient left in no apparent distress in bed  [x]         Call bell left within reach  [x]         Nursing notified  []         Caregiver present  []         Bed alarm activated    COMMUNICATION/EDUCATION:   The patients plan of care was discussed with: Occupational Therapist and Registered Nurse. [x]         Fall prevention education was provided and the patient/caregiver indicated understanding. [x]         Patient/family have participated as able in goal setting and plan of care. [x]         Patient/family agree to work toward stated goals and plan of care. []         Patient understands intent and goals of therapy, but is neutral about his/her participation.   []         Patient is unable to participate in goal setting and plan of care.     Thank you for this referral.  Enoc Steiner, PT   Time Calculation: 23 mins

## 2017-10-24 NOTE — PROGRESS NOTES
Problem: Self Care Deficits Care Plan (Adult)  Goal: *Acute Goals and Plan of Care (Insert Text)  Occupational Therapy Goals  Initiated 10/24/2017    1. Patient will perform toilet transfer using elevated toilet seat with overall independence within 7 days. 2.  Patient will perform sylvia area hygiene using adaptive equipment with overall modified independence within 7 days. 3.  Patient will ambulate around room to gather 5/5 ADL items with overall independence within 7 days. 4.  Patient will participate in upper body therapeutic exercises/activities to maximize activity tolerance and ability to participate in ADLs within 7 days. Occupational Therapy EVALUATION  Patient: Rafita Keller (46 y.o. female)  Date: 10/24/2017  Primary Diagnosis: SIRS (systemic inflammatory response syndrome) ([facility])  Leukocytosis  Abdominal pain  DEAN (acute kidney injury) ([facility]  Acute hyponatremia  Wound, open, anterior abdominal wall  Poor Venous Access  Procedure(s) (LRB):  INFUSION CATHETER INSERTION (N/A) 7 Days Post-Op   Precautions: Limited ambulation d/t continuous suction for LLQ ostomy       ASSESSMENT :  Based on the objective data described below, the patient presents with overall Mod I-Supervision for functional mobility, up to SBA for lower body ADLs, and independent for upper body ADLs. Patient received supine in bed, cleared for therapy by nursing. Patient able to come to EOB independently and demonstrate excellent functional reach to BLEs in tailor sit. Patient reporting she has been participating in bathing and dressing as able while at [address] [place] [address]. Patient reporting difficulty with sylvia area care, provided education on use of toilet tongs and overall energy conservation techniques for all ADLs to maximize her functional independence. Patient indicating good understanding.  Patient progressing well towards home, limited by multiple medications per MD, will follow up 1-2 more times closer to official discharge to ensure patient has no further questions. Anticipate no needs at discharge. Patient will benefit from skilled intervention to address the above impairments. Patients rehabilitation potential is considered to be Good  Factors which may influence rehabilitation potential include:   []             None noted  []             Mental ability/status  []             Medical condition  []             Home/family situation and support systems  []             Safety awareness  []             Pain tolerance/management  []             Other:      PLAN :  Recommendations and Planned Interventions:  [x]               Self Care Training                  [x]        Therapeutic Activities  [x]               Functional Mobility Training    []        Cognitive Retraining  [x]               Therapeutic Exercises           [x]        Endurance Activities  [x]               Balance Training                   []        Neuromuscular Re-Education  []               Visual/Perceptual Training     [x]   Home Safety Training  [x]               Patient Education                 [x]        Family Training/Education  []               Other (comment):    Frequency/Duration: Patient will be followed by occupational therapy 1 time a week to address goals. Discharge Recommendations: None  Further Equipment Recommendations for Discharge: Patient has plans with mother to obtain raised toilet seat and shower chair. Educated on toilet tongs. SUBJECTIVE:   Patient stated I just really want to go home.     OBJECTIVE DATA SUMMARY:   HISTORY:   Past Medical History:   Diagnosis Date    Crohn's disease ([facility] 8/15/2011    DVT (deep venous thrombosis) ([facility] 8/15/2011    Incarcerated ventral hernia 8/15/2011    Right flank pain 8/22/2011    Seizures ([facility]     Stroke [facility])      Past Surgical History:   Procedure Laterality Date    HX OTHER SURGICAL      multiple procedures related to her Crohn's disease    [place] LAP, INCISIONAL HERNIA REPAIR,INCARCERATED 9-10-08    dr. Roland Lay       Prior Level of Function/Home Situation: Per patient report, lives at home with mother and is independent at baseline. Patient with extensive medical events, was admitted from [facility]. Patient states she has not been home since April. Patient has supportive extended family and [employer] community. Patient has a dog, [place], that she loves. Expanded or extensive additional review of patient history:     Home Situation  Home Environment: Private residence  [address] Name: Minnie [place]  # Steps to Enter: 3  Rails to Enter: Yes  Hand Rails : Bilateral  One/Two Story Residence: Two story, live on 1st floor  Living Alone: No  Support Systems: Family member(s), Friends \ neighbors  Patient Expects to be Discharged to[de-identified] Private residence  Current DME Used/Available at Home: None  []  Right hand dominant   []  Left hand dominant    EXAMINATION OF PERFORMANCE DEFICITS:  Cognitive/Behavioral Status:  Neurologic State: Alert  Orientation Level: Oriented X4  Cognition: Appropriate decision making; Appropriate for age attention/concentration; Appropriate safety awareness; Follows commands  Perception: Appears intact  Perseveration: No perseveration noted  Safety/Judgement: Awareness of environment; Insight into deficits;Home safety;Good awareness of safety precautions    Skin: Appears intact    Edema: None noted in BUEs    Hearing: Auditory  Auditory Impairment: None    Vision/Perceptual:    Tracking: Able to track stimulus in all quadrants w/o difficulty    Acuity: Within Defined Limits         Range of Motion:  AROM: Within functional limits  PROM: Within functional limits    Strength:  Strength: Within functional limits    Coordination:  Coordination: Within functional limits  Fine Motor Skills-Upper: Left Intact; Right Intact    Gross Motor Skills-Upper: Left Intact; Right Intact    Tone & Sensation:  Tone: Normal  Sensation: Intact       Balance:  Sitting: Intact  Standing: Intact    Functional Mobility and Transfers for ADLs:  Bed Mobility:  Supine to Sit: Modified independent  Scooting: Independent    Transfers:  Sit to Stand: Modified independent  Stand to Sit: Modified independent  Toilet Transfer : Modified independent; Adaptive equipment (Will require elevated toilet; plans to purchase RTS)    ADL Assessment:  Feeding: Independent    Oral Facial Hygiene/Grooming: Independent    Bathing: Minimum assistance (inferred for sylvia area; educated on AE use)    Upper Body Dressing: Minimum assistance (Inferred for lines/leads)    Lower Body Dressing: Stand-by assistance (Inferred for line management only)    Toileting: Minimum assistance (Inferred per obs of coordination, strength - has been doing)         * Inferred per obs of BUE ROM, functional mobility, pain management, lines/leads, and activity tolerance       ADL Intervention and task modifications:]    Patient instructed and indicated understanding energy conservation techniques to increase independence and safety during all ADLs for end goal of returning back to a job. Provided instruction body is like a jar of marbles, marbles represent energy, at end of day need as many marbles as possible to obtain a good night sleep, REM sleep is when the body repairs itself. This ensures a full jar of marbles, full of energy when wake up to start a new day. Use energy conservation techniques during ADLs so can increase participation in life activities patient prefers, to ensure more frequent good days. If having a bad day, evaluate tasks completed day before and re-plan how to save energy to complete same tasks, for example if going grocery shopping do not complete full bathing/dressing/grooming. Educated on use of gathering all ADL items prior to initiating task as well as strategic placement of chair around home to allow for rest breaks. Instructed to complete bathing when feeling most energetic and giving herself enough time to complete. Visual handout provided.  Patient indicated understanding by stating tasks already completing to save energy ie sitting to don all clothing. Lower Body Dressing Assistance  Socks: Modified independent  Slip on Shoes with Back: Independent  Leg Crossed Method Used: Yes  Position Performed: Seated edge of bed    Toileting  Bladder Hygiene: Compensatory technique training  Bowel Hygiene: Compensatory technique training  Clothing Management: Independent  Adaptive Equipment: Toilet tongs    Cognitive Retraining  Safety/Judgement: Awareness of environment; Insight into deficits;Home safety;Good awareness of safety precautions    Functional Measure:  Barthel Index:    Bathin  Bladder: 10  Bowels: 10  Groomin  Dressing: 10  Feeding: 10  Mobility: 10  Stairs: 0  Toilet Use: 10  Transfer (Bed to Chair and Back): 10  Total: 75       Barthel and G-code impairment scale:  Percentage of impairment CH  0% CI  1-19% CJ  20-39% CK  40-59% CL  60-79% CM  80-99% CN  100%   Barthel Score 0-100 100 99-80 79-60 59-40 20-39 1-19   0   Barthel Score 0-20 20 17-19 13-16 9-12 5-8 1-4 0      The Barthel ADL Index: Guidelines  1. The index should be used as a record of what a patient does, not as a record of what a patient could do. 2. The main aim is to establish degree of independence from any help, physical or verbal, however minor and for whatever reason. 3. The need for supervision renders the patient not independent. 4. A patient's performance should be established using the best available evidence. Asking the patient, friends/relatives and nurses are the usual sources, but direct observation and common sense are also important. However direct testing is not needed. 5. Usually the patient's performance over the preceding 24-48 hours is important, but occasionally longer periods will be relevant. 6. Middle categories imply that the patient supplies over 50 per cent of the effort. 7. Use of aids to be independent is allowed.     Emma Cottrell., Barthel, BRITTANY (1965). Functional evaluation: the Barthel Index. [facility] (14)2. LANI Britt, Salma Figueroa., Romie Amaya., [place], [address] (1999). Measuring the change indisability after inpatient rehabilitation; comparison of the responsiveness of the Barthel Index and Functional [place] Measure. Journal of Neurology, Neurosurgery, and Psychiatry, 66(4), 472-710. NETTIE Rosas, TAMARA Lozano, & Lincoln Barker M.A. (2004.) Assessment of post-stroke quality of life in cost-effectiveness studies: The usefulness of the Barthel Index and the EuroQoL-5D. Quality of Life Research, 13, 291-09         G codes: In compliance with CMSs Claims Based Outcome Reporting, the following G-code set was chosen for this patient based on their primary functional limitation being treated: The outcome measure chosen to determine the severity of the functional limitation was the Barthel Index with a score of 75/100 which was correlated with the impairment scale. ? Self Care:     - CURRENT STATUS: CJ - 20%-39% impaired, limited or restricted    - GOAL STATUS: CI - 1%-19% impaired, limited or restricted    - D/C STATUS:  ---------------To be determined---------------     Occupational Therapy Evaluation Charge Determination   History Examination Decision-Making   LOW Complexity : Brief history review  LOW Complexity : 1-3 performance deficits relating to physical, cognitive , or psychosocial skils that result in activity limitations and / or participation restrictions  MEDIUM Complexity : Patient may present with comorbidities that affect occupational performnce.  Miniml to moderate modification of tasks or assistance (eg, physical or verbal ) with assesment(s) is necessary to enable patient to complete evaluation       Based on the above components, the patient evaluation is determined to be of the following complexity level: LOW   Pain:  Pain Scale 1: Numeric (0 - 10)  Pain Intensity 1: 8  Pain Location 1: Abdomen  Pain Orientation 1: Lateral  Pain Description 1: Aching;Constant  Pain Intervention(s) 1: Medication (see MAR)  Activity Tolerance:   Good. Please refer to the flowsheet for vital signs taken during this treatment. After treatment:   [x] Patient left in no apparent distress sitting up in chair  [] Patient left in no apparent distress in bed  [x] Call bell left within reach  [x] Nursing notified  [] Caregiver present  [] Bed alarm activated    COMMUNICATION/EDUCATION:   The patients plan of care was discussed with: Physical Therapist, Registered Nurse and Physician. [x] Home safety education was provided and the patient/caregiver indicated understanding. [x] Patient/family have participated as able in goal setting and plan of care. [] Patient/family agree to work toward stated goals and plan of care. [] Patient understands intent and goals of therapy, but is neutral about his/her participation. [] Patient is unable to participate in goal setting and plan of care. This patients plan of care is appropriate for delegation to \[facility]\"".     Thank you for this referral.  Mildred Viveros OT  Time Calculation: 42 mins

## 2017-10-24 NOTE — PROGRESS NOTES
Bedside shift change report given to [facility] [address] (oncoming nurse) by Elmira Espitia, student nurse and Gregory Dewitt (offgoing nurse). Report included the following information SBAR, Kardex, Intake/Output, MAR and Recent Results.

## 2017-10-25 LAB
ALBUMIN SERPL-MCNC: 2.2 G/DL (ref 3.5–5)
ALBUMIN/GLOB SERPL: 0.6 {RATIO} (ref 1.1–2.2)
ALP SERPL-CCNC: 297 U/L (ref 45–117)
ALT SERPL-CCNC: 73 U/L (ref 12–78)
ANION GAP SERPL CALC-SCNC: 7 MMOL/L (ref 5–15)
AST SERPL-CCNC: 39 U/L (ref 15–37)
BASOPHILS # BLD: 0 K/UL (ref 0–0.1)
BASOPHILS NFR BLD: 0 % (ref 0–1)
BILIRUB SERPL-MCNC: 0.5 MG/DL (ref 0.2–1)
BUN SERPL-MCNC: 24 MG/DL (ref 6–20)
BUN/CREAT SERPL: 41 (ref 12–20)
CALCIUM SERPL-MCNC: 8.6 MG/DL (ref 8.5–10.1)
CHLORIDE SERPL-SCNC: 105 MMOL/L (ref 97–108)
CO2 SERPL-SCNC: 24 MMOL/L (ref 21–32)
CREAT SERPL-MCNC: 0.59 MG/DL (ref 0.55–1.02)
EOSINOPHIL # BLD: 0.2 K/UL (ref 0–0.4)
EOSINOPHIL NFR BLD: 1 % (ref 0–7)
ERYTHROCYTE [DISTWIDTH] IN BLOOD BY AUTOMATED COUNT: 16.7 % (ref 11.5–14.5)
GLOBULIN SER CALC-MCNC: 4 G/DL (ref 2–4)
GLUCOSE BLD STRIP.AUTO-MCNC: 105 MG/DL (ref 65–100)
GLUCOSE BLD STRIP.AUTO-MCNC: 153 MG/DL (ref 65–100)
GLUCOSE BLD STRIP.AUTO-MCNC: 194 MG/DL (ref 65–100)
GLUCOSE BLD STRIP.AUTO-MCNC: 210 MG/DL (ref 65–100)
GLUCOSE SERPL-MCNC: 166 MG/DL (ref 65–100)
HCT VFR BLD AUTO: 29.2 % (ref 35–47)
HGB BLD-MCNC: 9.8 G/DL (ref 11.5–16)
LYMPHOCYTES # BLD: 3.3 K/UL (ref 0.8–3.5)
LYMPHOCYTES NFR BLD: 20 % (ref 12–49)
MAGNESIUM SERPL-MCNC: 1.7 MG/DL (ref 1.6–2.4)
MCH RBC QN AUTO: 28.5 PG (ref 26–34)
MCHC RBC AUTO-ENTMCNC: 33.6 G/DL (ref 30–36.5)
MCV RBC AUTO: 84.9 FL (ref 80–99)
MONOCYTES # BLD: 1 K/UL (ref 0–1)
MONOCYTES NFR BLD: 6 % (ref 5–13)
NEUTS SEG # BLD: 12.1 K/UL (ref 1.8–8)
NEUTS SEG NFR BLD: 73 % (ref 32–75)
PHOSPHATE SERPL-MCNC: 3.5 MG/DL (ref 2.6–4.7)
PLATELET # BLD AUTO: 233 K/UL (ref 150–400)
POTASSIUM SERPL-SCNC: 4.1 MMOL/L (ref 3.5–5.1)
PROT SERPL-MCNC: 6.2 G/DL (ref 6.4–8.2)
RBC # BLD AUTO: 3.44 M/UL (ref 3.8–5.2)
SERVICE CMNT-IMP: ABNORMAL
SODIUM SERPL-SCNC: 136 MMOL/L (ref 136–145)
WBC # BLD AUTO: 16.5 K/UL (ref 3.6–11)

## 2017-10-25 PROCEDURE — 74011250636 HC RX REV CODE- 250/636: Performed by: HOSPITALIST

## 2017-10-25 PROCEDURE — 74011250636 HC RX REV CODE- 250/636: Performed by: PHYSICAL MEDICINE & REHABILITATION

## 2017-10-25 PROCEDURE — 65270000029 HC RM PRIVATE

## 2017-10-25 PROCEDURE — 74011636637 HC RX REV CODE- 636/637: Performed by: HOSPITALIST

## 2017-10-25 PROCEDURE — 74011250637 HC RX REV CODE- 250/637: Performed by: INTERNAL MEDICINE

## 2017-10-25 PROCEDURE — 74011000250 HC RX REV CODE- 250: Performed by: NURSE PRACTITIONER

## 2017-10-25 PROCEDURE — 74011636637 HC RX REV CODE- 636/637: Performed by: INTERNAL MEDICINE

## 2017-10-25 PROCEDURE — 74011250636 HC RX REV CODE- 250/636: Performed by: INTERNAL MEDICINE

## 2017-10-25 PROCEDURE — 85025 COMPLETE CBC W/AUTO DIFF WBC: CPT | Performed by: INTERNAL MEDICINE

## 2017-10-25 PROCEDURE — 74011000258 HC RX REV CODE- 258: Performed by: INTERNAL MEDICINE

## 2017-10-25 PROCEDURE — 82962 GLUCOSE BLOOD TEST: CPT

## 2017-10-25 PROCEDURE — 74011000250 HC RX REV CODE- 250: Performed by: INTERNAL MEDICINE

## 2017-10-25 PROCEDURE — 74011250637 HC RX REV CODE- 250/637: Performed by: PHYSICAL MEDICINE & REHABILITATION

## 2017-10-25 PROCEDURE — 36415 COLL VENOUS BLD VENIPUNCTURE: CPT | Performed by: INTERNAL MEDICINE

## 2017-10-25 PROCEDURE — 74011250637 HC RX REV CODE- 250/637: Performed by: HOSPITALIST

## 2017-10-25 PROCEDURE — 83735 ASSAY OF MAGNESIUM: CPT | Performed by: INTERNAL MEDICINE

## 2017-10-25 PROCEDURE — 74011000250 HC RX REV CODE- 250: Performed by: HOSPITALIST

## 2017-10-25 PROCEDURE — 84100 ASSAY OF PHOSPHORUS: CPT | Performed by: INTERNAL MEDICINE

## 2017-10-25 PROCEDURE — 80053 COMPREHEN METABOLIC PANEL: CPT | Performed by: INTERNAL MEDICINE

## 2017-10-25 RX ORDER — PREDNISONE 10 MG/1
20 TABLET ORAL 2 TIMES DAILY
Status: DISCONTINUED | OUTPATIENT
Start: 2017-10-25 | End: 2017-10-27 | Stop reason: HOSPADM

## 2017-10-25 RX ORDER — MORPHINE SULFATE 15 MG/1
30 TABLET ORAL
Status: DISCONTINUED | OUTPATIENT
Start: 2017-10-25 | End: 2017-10-27 | Stop reason: HOSPADM

## 2017-10-25 RX ADMIN — CLONAZEPAM 0.5 MG: 0.5 TABLET ORAL at 10:24

## 2017-10-25 RX ADMIN — MORPHINE SULFATE 30 MG: 15 TABLET ORAL at 12:50

## 2017-10-25 RX ADMIN — PREDNISONE 20 MG: 10 TABLET ORAL at 07:11

## 2017-10-25 RX ADMIN — HYDROMORPHONE HYDROCHLORIDE 0.5 MG: 1 INJECTION, SOLUTION INTRAMUSCULAR; INTRAVENOUS; SUBCUTANEOUS at 22:15

## 2017-10-25 RX ADMIN — INSULIN LISPRO 2 UNITS: 100 INJECTION, SOLUTION INTRAVENOUS; SUBCUTANEOUS at 12:49

## 2017-10-25 RX ADMIN — HYDROMORPHONE HYDROCHLORIDE 0.5 MG: 1 INJECTION, SOLUTION INTRAMUSCULAR; INTRAVENOUS; SUBCUTANEOUS at 01:42

## 2017-10-25 RX ADMIN — ENOXAPARIN SODIUM 40 MG: 40 INJECTION SUBCUTANEOUS at 11:15

## 2017-10-25 RX ADMIN — MORPHINE SULFATE 100 MG: 100 TABLET, EXTENDED RELEASE ORAL at 14:05

## 2017-10-25 RX ADMIN — Medication: at 05:11

## 2017-10-25 RX ADMIN — I.V. FAT EMULSION 500 ML: 20 EMULSION INTRAVENOUS at 19:11

## 2017-10-25 RX ADMIN — MORPHINE SULFATE 100 MG: 100 TABLET, EXTENDED RELEASE ORAL at 06:31

## 2017-10-25 RX ADMIN — INSULIN LISPRO 3 UNITS: 100 INJECTION, SOLUTION INTRAVENOUS; SUBCUTANEOUS at 23:51

## 2017-10-25 RX ADMIN — Medication 10 ML: at 21:15

## 2017-10-25 RX ADMIN — MORPHINE SULFATE 30 MG: 15 TABLET ORAL at 21:14

## 2017-10-25 RX ADMIN — HYDROMORPHONE HYDROCHLORIDE 0.5 MG: 1 INJECTION, SOLUTION INTRAMUSCULAR; INTRAVENOUS; SUBCUTANEOUS at 17:50

## 2017-10-25 RX ADMIN — Medication 10 ML: at 06:31

## 2017-10-25 RX ADMIN — MORPHINE SULFATE 30 MG: 15 TABLET ORAL at 17:50

## 2017-10-25 RX ADMIN — POTASSIUM & SODIUM PHOSPHATES POWDER PACK 280-160-250 MG 1 PACKET: 280-160-250 PACK at 10:24

## 2017-10-25 RX ADMIN — ONDANSETRON 4 MG: 2 INJECTION INTRAMUSCULAR; INTRAVENOUS at 14:05

## 2017-10-25 RX ADMIN — MORPHINE SULFATE 30 MG: 15 TABLET ORAL at 15:32

## 2017-10-25 RX ADMIN — SODIUM CHLORIDE 250 ML: 900 INJECTION, SOLUTION INTRAVENOUS at 18:15

## 2017-10-25 RX ADMIN — ASCORBIC ACID, VITAMIN A PALMITATE, CHOLECALCIFEROL, THIAMINE HYDROCHLORIDE, RIBOFLAVIN-5 PHOSPHATE SODIUM, PYRIDOXINE HYDROCHLORIDE, NIACINAMIDE, DEXPANTHENOL, ALPHA-TOCOPHEROL ACETATE, VITAMIN K1, FOLIC ACID, BIOTIN, CYANOCOBALAMIN: 200; 3300; 200; 6; 3.6; 6; 40; 15; 10; 150; 600; 60; 5 INJECTION, SOLUTION INTRAVENOUS at 19:04

## 2017-10-25 RX ADMIN — MORPHINE SULFATE 30 MG: 15 TABLET ORAL at 03:27

## 2017-10-25 RX ADMIN — ONDANSETRON 4 MG: 2 INJECTION INTRAMUSCULAR; INTRAVENOUS at 03:27

## 2017-10-25 RX ADMIN — INSULIN LISPRO 2 UNITS: 100 INJECTION, SOLUTION INTRAVENOUS; SUBCUTANEOUS at 06:31

## 2017-10-25 RX ADMIN — FAMOTIDINE 20 MG: 10 INJECTION, SOLUTION INTRAVENOUS at 21:14

## 2017-10-25 RX ADMIN — FAMOTIDINE 20 MG: 10 INJECTION, SOLUTION INTRAVENOUS at 10:25

## 2017-10-25 RX ADMIN — HYDROMORPHONE HYDROCHLORIDE 0.5 MG: 1 INJECTION, SOLUTION INTRAMUSCULAR; INTRAVENOUS; SUBCUTANEOUS at 07:11

## 2017-10-25 RX ADMIN — MORPHINE SULFATE 100 MG: 100 TABLET, EXTENDED RELEASE ORAL at 21:14

## 2017-10-25 RX ADMIN — MORPHINE SULFATE 30 MG: 15 TABLET ORAL at 23:41

## 2017-10-25 RX ADMIN — HYDROMORPHONE HYDROCHLORIDE 0.5 MG: 1 INJECTION, SOLUTION INTRAMUSCULAR; INTRAVENOUS; SUBCUTANEOUS at 11:16

## 2017-10-25 RX ADMIN — CLONAZEPAM 0.5 MG: 0.5 TABLET ORAL at 21:13

## 2017-10-25 RX ADMIN — Medication 10 ML: at 14:05

## 2017-10-25 RX ADMIN — Medication 10 ML: at 10:26

## 2017-10-25 RX ADMIN — PREDNISONE 20 MG: 10 TABLET ORAL at 19:03

## 2017-10-25 RX ADMIN — MORPHINE SULFATE 30 MG: 15 TABLET ORAL at 08:56

## 2017-10-25 NOTE — PROGRESS NOTES
Bedside shift change report given to Eliseo Hendrickson (oncoming nurse) by Yung Ceballos (offgoing nurse). Report included the following information SBAR and Kardex.

## 2017-10-25 NOTE — WOUND CARE
WOCN Note:   reconsult placed by RN for abdominal wound/fistula management;       Chart review revealed:   Admitted for abd pain; history of crohns, DVT, ventral hernia repair, perforated bowel, small bowel obstruction, enterocutaneous fistula; Surgery following [facility]  Admitted from [address] [place] [address] Road:   Patient is alert, verbal participating in fistula management during pouch change;    Bed: total care bariatric;  Patient has a woodruff catheter;   Diet: NPO  Patient managing pain with PCA;      1. POA midline abdominal wound with enterocutaneous fistula with 3 active pseudostomas draining gold liquid drainage;   2. POA left abdominal ostomy appliance intact;       Patient positioned supine at completion of treatment;      Skin/wound treatment Recommendations:    Weekly and as needed remove eakins pouch; clean wound and periwound skin with normal saline, apply marathon to periwound skin; cut to fit eakins pouch to fit around wound; place stomahesive paste in crease at 3, 6, and 9 O'clock; apply paste around inside edge of open area on eakins pouch; apply around wound and secure with transparent dressing then seal with all care skin prep;       Skin Care & Pressure Injury Prevention Recommendations:  1.  Minimize layers of linen/pads under patient to optimize support surface.    2.  Reposition patient approximately every 2 hours; 3.  Float heels with pillow under calves.    4.  Continue on total care sport bed;    5.  Assess/protect skin in contact with medical devices.  Keep skin moisturized;    6.  Ensure that patient is repositioning in chair shifting weight approximately every 15 minutes and standing up every hour.       Discussed above assessment and recommendations with Lina(RN);      Transition of Care: Plan to follow weekly and as needed while admitted to hospital.              SONY Hu, RN, [facility] [place]  Certified Wound, Ostomy, Continence Nurse  office [phone]  pager [phone] [facility], RN]

## 2017-10-25 NOTE — PROGRESS NOTES
Bedside and Verbal shift change report given to Jean-Claude Otto [phone] (oncoming nurse) by Jaymie Jones (offgoing nurse). Report included the following information SBAR, Kardex, MAR and Recent Results.

## 2017-10-25 NOTE — PROGRESS NOTES
Medical record reviewed and problems noted. CM met with patient earlier today in rounds and this evening. CM discussed plans for discharge on Friday which includes home with TPN. [facility] offered. Patient had nor preference. Referral sent to [facility]. Will continue to follow.   Casie Lockhart, MOUNIKA, CRM

## 2017-10-25 NOTE — DIABETES MGMT
DTC Progress Note    Recommendations/ Comments: POC glucoses in and out of target with TPN being run overnight and this is reflected in higher BS during this period. TPN has insulin in it at 15 units/L and pt received 7 additional units of correction in past 24 hours. Will continue to follow. Chart reviewed on [address] Patient is a 46 y.o. female with no known history of diabetes    A1c:   No results found for: HBA1C, HGBE8, BWB7DGBO    Recent Glucose Results: Lab Results   Component Value Date/Time     (H) 10/25/2017 03:40 AM    GLUCPOC 153 (H) 10/25/2017 12:00 PM    GLUCPOC 194 (H) 10/25/2017 06:23 AM    GLUCPOC 202 (H) 10/24/2017 11:19 PM        Lab Results   Component Value Date/Time    Creatinine 0.59 10/25/2017 03:40 AM     Estimated Creatinine Clearance: 183.9 mL/min (based on Cr of 0.59). Active Orders   Diet    DIET MECHANICAL SOFT No Conc. Sweets        PO intake: Patient Vitals for the past 72 hrs:   % Diet Eaten   10/25/17 1002 15 %   10/24/17 1428 75 %   10/24/17 0900 50 %   10/22/17 1808 25 %   10/22/17 1631 20 %       Current hospital DM medication: lispro insulin correction scale and 15 units/L regular insulin in TPN     Will continue to follow as needed.     Thank you    Andres Hernandez RD, CDE

## 2017-10-25 NOTE — PROGRESS NOTES
Hospitalist Progress Note  Anita Peralta MD  Answering service: [phone] from in house phone      Date of Service:  10/25/2017  NAME:  Cat Hogue  :  1975  MRN:  [id number]      Admission Summary:   45 yo woman with Crohn disease, h/o DVT, incarcerated ventral hernia, chronic abdominal pain on Dilaudid PCA, morbid obesity, seizures, stroke and multiple repeat abdominal surgeries was BIBEMS to the ED from [place] [address] on 10/15/17 with abdominal pain, nausea/vomiting. She was admitted with SIRS and intractable abdominal pain. Interval history / Subjective:   Still c/o usual abdominal pain; seen with nurse and CM     Assessment & Plan:     Acute on chronic abdominal pain (POA)   - unclear etiology: may be a Crohn flare vs worsening of chronic pain  - CT abdomen/pelvis 10/15 large anterior abdominal wound, and a site of previous/known enterocutaneous fistula though this is difficult to assess given lack of oral and IV contrastmaterial. Mild stranding around the anterior abdominal wound as well as in the abdominal mesentery.  Nonspecific wall thickening/inflammatory appearance of the duodenum.  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 - local wound care     Systemic inflammatory response syndrome (POA)   - likely due to Crohn flare, intraabdominal infection possible  - BCx 10/15 negative  - s/p levofloxacin 10/15-10/17, vancomycin 10/15-10/18, Zosyn 10/15-10/20     Hyponatremia - resolved with IVF      Hypokalemia, hypomagnesemia, hypophosphatemia - replete prn     Hypercalcemia - mild, likely due to TPN; improved after adjusting TPN     Hyperglycemia - due to TPN; increased insulin in TPN to 15 units/L with better glycemic control     DEAN - pre-renal, resolved with IV fluids     Chronic pain syndrome - Palliative care following and managing pain; still on Dilaudid PCA     Morbid obesity, Body mass index is 55.06 kg/(m^2). Code status: Full  DVT prophylaxis: enoxaparin SC    Care Plan discussed with: Patient/Family, Nurse and   Disposition: Came from [facility] but wants to go home; cleared by PT/OT for home so need to set up [facility] for TPN, PCA, wound care     Hospital Problems  Date Reviewed: 10/16/2017          Codes Class Noted POA    Acute hyponatremia ICD-10-CM: E87.1  ICD-9-CM: 276.1  10/16/2017 Unknown        Leukocytosis ICD-10-CM: D72.829  ICD-9-CM: 288.60  10/16/2017 Unknown        Wound, open, anterior abdominal wall ICD-10-CM: S31.109A  ICD-9-CM: 879.2  10/16/2017 Unknown        * (Principal)SIRS (systemic inflammatory response syndrome) ([facility] ICD-10-CM: R65.10  ICD-9-CM: 995.90  10/16/2017 Unknown        DEAN (acute kidney injury) ([facility] ICD-10-CM: N17.9  ICD-9-CM: 584.9  10/16/2017 Unknown        Abdominal pain ICD-10-CM: R10.9  ICD-9-CM: 789.00  6/25/2017 Unknown            Review of Systems:   Pertinent items are noted in HPI. Vital Signs:    Last 24hrs VS reviewed since prior progress note.  Most recent are:  Visit Vitals    BP 97/57 (BP 1 Location: Left arm, BP Patient Position: At rest)    Pulse 77    Temp 98.2 °F (36.8 °C)    Resp 16    Ht 5' 4\" (1.626 m)    Wt 145.5 kg (320 lb 12.3 oz)    SpO2 100%    BMI 55.06 kg/m2       Intake/Output Summary (Last 24 hours) at 10/25/17 1343  Last data filed at 10/25/17 1340   Gross per 24 hour   Intake              582 ml   Output             2975 ml   Net            -2393 ml      Physical Examination:     Constitutional:  awake, no acute distress, cooperative, obese   ENT:  oral mucosa moist, oropharynx benign  Neck supple, no masses   Resp:  CTA bilaterally, no wheezing/rhonchi/rales   CV:  regular rhythm, normal rate, no m/r/g appreciated    GI:  +BS, soft, non distended, non tender, obese, ostomies x2     Musculoskeletal:  moves all extremities    Neurologic:  AAOx3, NFD     Skin:  multiple ostomies, multiple tattoos  Eyes:  PERRL    Data Review:    Review and/or order of clinical lab test  Review and/or order of tests in the radiology section of CPT  Review and/or order of tests in the medicine section of CPT    Labs:     Recent Labs      10/25/17   0340  10/23/17   0003   WBC  16.5*  18.9*   HGB  9.8*  9.2*   HCT  29.2*  28.0*   PLT  233  256     Recent Labs      10/25/17   0340  10/24/17   0549  10/23/17   0003   NA  136  136  137   K  4.1  3.8  3.5   CL  105  105  108   CO2  24  23  23   BUN  24*  24*  27*   CREA  0.59  0.61  0.69   GLU  166*  168*  154*   CA  8.6  8.8  8.5   MG  1.7  1.8  1.4*   PHOS  3.5   --   2.3*     Recent Labs      10/25/17   0340   SGOT  39*   ALT  73   AP  297*   TBILI  0.5   TP  6.2*   ALB  2.2*   GLOB  4.0     No results for input(s): INR, PTP, APTT in the last 72 hours. No lab exists for component: INREXT, INREXT   No results for input(s): FE, TIBC, PSAT, FERR in the last 72 hours. No results found for: FOL, RBCF   No results for input(s): PH, PCO2, PO2 in the last 72 hours. No results for input(s): CPK, CKNDX, TROIQ in the last 72 hours.     No lab exists for component: CPKMB  No results found for: CHOL, CHOLX, CHLST, CHOLV, HDL, LDL, LDLC, DLDLP, TGLX, TRIGL, TRIGP, CHHD, [facility]  Lab Results   Component Value Date/Time    Glucose (POC) 153 10/25/2017 12:00 PM    Glucose (POC) 194 10/25/2017 06:23 AM    Glucose (POC) 202 10/24/2017 11:19 PM    Glucose (POC) 136 10/24/2017 06:03 PM    Glucose (POC) 142 10/24/2017 11:42 AM     Lab Results   Component Value Date/Time    Color YELLOW/STRAW 10/15/2017 11:02 PM    Appearance CLOUDY 10/15/2017 11:02 PM    Specific gravity 1.019 10/15/2017 11:02 PM    Specific gravity 1.020 07/07/2009 07:20 PM    pH (UA) 5.5 10/15/2017 11:02 PM    Protein TRACE 10/15/2017 11:02 PM    Glucose NEGATIVE  10/15/2017 11:02 PM    Ketone NEGATIVE  10/15/2017 11:02 PM    Bilirubin NEGATIVE  10/15/2017 11:02 PM    Urobilinogen 0.2 10/15/2017 11:02 PM    Nitrites NEGATIVE  10/15/2017 11:02 PM    Leukocyte Esterase MODERATE 10/15/2017 11:02 PM    Epithelial cells FEW 10/15/2017 11:02 PM    Bacteria NEGATIVE  10/15/2017 11:02 PM    WBC 5-10 10/15/2017 11:02 PM    RBC 5-10 10/15/2017 11:02 PM     Medications Reviewed:     Current Facility-Administered Medications   Medication Dose Route Frequency    morphine IR (MS IR) tablet 30 mg  30 mg Oral Q3H    predniSONE (DELTASONE) tablet 20 mg  20 mg Oral BID    TPN ADULT - CENTRAL   IntraVENous CONTINUOUS    morphine CR (MS CONTIN) tablet 100 mg  100 mg Oral Q8H    HYDROmorphone (PF) (DILAUDID) injection 0.5 mg  0.5 mg IntraVENous Q4H PRN    sodium chloride 0.9 % bolus infusion 250 mL  250 mL IntraVENous PRN    ondansetron (ZOFRAN) injection 4 mg  4 mg IntraVENous Q6H PRN    sodium chloride (NS) flush 20 mL  20 mL InterCATHeter PRN    sodium chloride (NS) flush 10 mL  10 mL InterCATHeter Q24H    sodium chloride (NS) flush 10 mL  10 mL InterCATHeter PRN    sodium chloride (NS) flush 10 mL  10 mL InterCATHeter Q8H    alteplase (CATHFLO) 1 mg in sterile water (preservative free) 1 mL injection  1 mg InterCATHeter PRN    bacitracin 500 unit/gram packet 1 Packet  1 Packet Topical PRN    insulin lispro (HUMALOG) injection   SubCUTAneous Q6H    enoxaparin (LOVENOX) injection 40 mg  40 mg SubCUTAneous Q24H    acetaminophen (TYLENOL) tablet 650 mg  650 mg Oral Q6H PRN    glucose chewable tablet 16 g  4 Tab Oral PRN    dextrose (D50W) injection syrg 12.5-25 g  12.5-25 g IntraVENous PRN    glucagon (GLUCAGEN) injection 1 mg  1 mg IntraMUSCular PRN    fat emulsion 20% (LIPOSYN, INTRAlipid) infusion 500 mL  500 mL IntraVENous Q MON, WED & FRI    clonazePAM (KlonoPIN) tablet 0.5 mg  0.5 mg Oral BID    sodium chloride (NS) flush 5-10 mL  5-10 mL IntraVENous Q8H    sodium chloride (NS) flush 5-10 mL  5-10 mL IntraVENous PRN    sodium chloride (NS) flush 5-10 mL  5-10 mL IntraVENous PRN    famotidine (PF) (PEPCID) 20 mg in sodium chloride 0.9 % 10 mL injection  20 mg IntraVENous Q12H    sodium chloride (NS) flush 5-10 mL  5-10 mL IntraVENous PRN     ______________________________________________________________________  EXPECTED LENGTH OF STAY: 3d 14h  ACTUAL LENGTH OF STAY:          9                 Ovidio Garvin MD

## 2017-10-25 NOTE — PROGRESS NOTES
NUTRITION COMPLETE ASSESSMENT    RECOMMENDATIONS:   1. Consider discontinuing oral K-phos repletion 4x/day as this is likely not being absorbed adequately-- may need to adjust in TPN for repletion    2. Consider concentrating TPN (or increasing volume) to better meet estimated needs (current regimen is meeting 87% and 90% of estimated kcal and protein needs, respectively. Concentration Option (with current volume)-- meets 91% and 100% of kcal and protein needs. 437 gm CHO:  6%AA D22 @ 85 mL/hr x 1 hour    @ 165 mL/hr x 11 hours    @ 85 mL/hr x last hour   + 20% lipids, 500 mL 3x/week    Increased Volume Goal (with current macronutrient concentrations)-- meets 100% of needs: 481 gm CHO.  5%AA D20 @ 85 mL/hr x 1 hour    @ 172 mL/hr x 13 hours    @ 85 mL/hr x last hour   + 20% lipids, 500 mL 3x/week    2. Continue daily weights     **Nursing: Please document all po intake (liquid volumes as well as meal percentages to best assess fistula output)    Interventions/Plan:   Food/Nutrient Delivery:    TPN as primary source of nutrition (po is for pleasure)    Assessment:   Reason for Assessment:   [x]Reassessment     Diet:   Mech Soft    Nutritionally Significant Medications: [x] Reviewed & Includes: pepcid q12; humalog correction scale; prednisone   TPN:   5%AA D20 @ 85 mL/hr x 1 hour    @ 165 mL/hr x 11 hours    @ 85 mL/hr x last hour   + 20% lipids, 500 mL 3x/week   + MVI, trace elements 3x/week, 15 units insulin/L  Meal Intake: Patient Vitals for the past 100 hrs:   % Diet Eaten   10/25/17 1002 15 %   10/24/17 1428 75 %   10/24/17 0900 50 %   10/22/17 1808 25 %   10/22/17 1631 20 %     Subjective:  Pt seems in better spirits today. She is eager to go home, but wants to be sure everything is taken care of for discharge. Objective:  Chart reviewed, discussed with RN and MD.  Pt remains on TPN for primary source of nutrition. Pt is drinking and eating, but it is mostly draining out of her fistula.   Fistula output is high (see below). She is aware of the importance of keeping track of all liquids consumed to account for fistula drainage. Steroids increased by GI and pt feeling better. PCA to be discontinued today. Would suggest either concentrating or increasing TPN volume to better meet estimated needs. TPN Option 1 would provide a daily average of 2255 kcal, 119 g protein in 1985 mL-- meeting 91% and 100% of estimated kcal and protein needs, respectively. 437 gm CHO. TPN Option 2 would provide a daily average of 2546 kcal, 120 g protein, 481 gm CHO and 2406 mL. Discussed K-phos repletion with MD-- this is likely not being adequately absorbed orally. Plan is to discontinue and monitor lytes for need to possible increase in TPN. Fistula drainage:  10/25: 1450 mL so far today  10/24: 2650 mL  10/23: 2750 mL  10/22: 1900 mL    Estimated Nutrition Needs:   Kcals/day: 2491 Kcals/day (4719-6811 kcal/day (MSJ x 1.2-1.3))  Protein: 110 g (110-136 g/day (2-2.5 g/kg IBW))  Fluid: 2500 ml (1 mL/kcal (adjust per fistula output))     Based On: Edelmira Lombardi  Weight Used:  (Actual wt of 141.6 kg (kcal needs); IBW of 55 kg (pro needs)  )    Pt expected to meet estimated nutrient needs:  [x]   Yes (via TPN)    Nutrition Diagnosis:   1.  Altered GI function related to Crohn's, multiple abdominal surgeries, frozen abdomen, fistula as evidenced by need for chronic TPN    Goals:     Pt to meet at least 90% of estimated needs via TPN over the next 5-7 days     Monitoring & Evaluation:    - Enteral/parenteral nutrition intake   - Weight/weight change     Previous Nutrition Goals Met:  Progressing  Previous Recommendations:      N/A    Education & Discharge Needs:   [] None Identified   [x] Identified and addressed    [x] Participated in care plan, discharge planning, and/or interdisciplinary rounds        Cultural, Bahai and ethnic food preferences identified:  NONE      Skin Integrity: []Intact  [x]Other: fistula  Edema: []None [x]Other: trace  Last BM: 10-24-17 (ostomy)  Food Allergies: [x]None []Other    Anthropometrics:    Weight Loss Metrics 10/25/2017 10/3/2017 9/12/2017 8/23/2017 6/25/2017 6/25/2017 6/25/2017   Today's Wt 320 lb 12.3 oz 338 lb 338 lb 338 lb 13.6 oz - 343 lb -   BMI 55.06 kg/m2 58.02 kg/m2 58.02 kg/m2 - 58.16 kg/m2 - 58.88 kg/m2      Last 3 Recorded Weights in this Encounter    10/23/17 1644 10/24/17 1800 10/25/17 0454   Weight: 143.4 kg (316 lb 2.2 oz) 145 kg (319 lb 10.7 oz) 145.5 kg (320 lb 12.3 oz)      Weight Source: Bed  Height: 5' 4\" (162.6 cm),    Body mass index is 55.06 kg/(m^2). IBW : 54.4 kg (120 lb),    Usual Body Weight: 150.4 kg (331 lb 9.2 oz) (8/9/17),      Labs:  Lab Results   Component Value Date/Time    Sodium 136 10/25/2017 03:40 AM    Potassium 4.1 10/25/2017 03:40 AM    Chloride 105 10/25/2017 03:40 AM    CO2 24 10/25/2017 03:40 AM    Glucose 166 10/25/2017 03:40 AM    BUN 24 10/25/2017 03:40 AM    Creatinine 0.59 10/25/2017 03:40 AM    Calcium 8.6 10/25/2017 03:40 AM    Magnesium 1.7 10/25/2017 03:40 AM    Phosphorus 3.5 10/25/2017 03:40 AM    Albumin 2.2 10/25/2017 03:40 AM     No results found for: HBA1C, HGBE8, SMP5MWJA, FCR2TMFQ  Lab Results   Component Value Date/Time    Glucose 166 10/25/2017 03:40 AM    Glucose (POC) 153 10/25/2017 12:00 PM      Lab Results   Component Value Date/Time    ALT (SGPT) 73 10/25/2017 03:40 AM    AST (SGOT) 39 10/25/2017 03:40 AM    Alk.  phosphatase 297 10/25/2017 03:40 AM    Bilirubin, direct 0.1 07/07/2009 06:38 PM    Bilirubin, total 0.5 10/25/2017 03:40 AM      [address]

## 2017-10-25 NOTE — CONSULTS
Palliative Medicine Consult  [place]ath: [phone] ([phone])    Patient Name: Frank Almaraz  YOB: 1975    Date of Initial Consult: 10/16/17  Reason for Consult: pain management  Requesting Provider: Dr. Ivette Golden  Primary Care Physician: Eliezer Vasques MD      SUMMARY:   Frank Almaraz is a 46 y.o. with a past history of Crohn's disease (followed by Dr Calixto Arreaga), multiple surgeries s/p total colectomy with end ileostomy, 6/7/17 exp lap w SB perf, 6/28/17 exp lap, lysis of adhesions, repair of enterotomy, SMA stent on L, 7/16/17 exp lap, lysis of adhesions, fistula exclusion with feeding tube placement, frozen abdomen/ wound vac assisted closure placement, prolonged TPN, MRSA wound infection, awaiting repair of EC fistula planned for Jan 2018 if stable for surgery (an none of her home Crohn's meds until after healed) who was admitted on 10/15/2017 from [facility] with a diagnosis of acute on chronic abdominal pain. CT abd/pelvis done. Thought to be from Crohn's flare and GI started on steroids. Has not been on her home Crohn's medication since earlier this year due to recurrent infections. Current medical issues leading to Palliative Medicine involvement include: pain management. Patient's mother, Toñito Melchor is NOK   PALLIATIVE DIAGNOSES:   1. Acute on chronic abdominal pain  2. Crohn's disease ? Flare  3. Chronic anxiety and depression, chronic benzo use  4. Debility, generalized muscle wasting  5. Nausea- multifactorial       PLAN:   1. Pain -  Thought to be Crohn's flare, GI started on steroids- awaiting to see if primary GI feels safe to incr Prednisone further (now on 40mg daily). Tried Fentanyl patch to 100mcg here w/out any benefit. 2. Cont to encourage pt that she will improve- and I need to wean these pain medications hortencia because we are trying to avoid stay at 2 [facility]. Pt is tearful but agreeable. 3. Awaiting to see if Dr Calixto Arreaga feels is safe to incr steroids further.    4. Pain medication adjustments:   1. Continue  MSContin to 100mg every 8h (from 60,60,100mg). See this as a temporary measure in order to wean off IV. 2. Morphine IR tablets 30mg every 3h prn- change from PRN. -- will schedule while in hospital, pt may refuse. At home she will taper by decreasing from 8 to  7 to 6 per day over time. This will likely take 1-2 weeks. 3. Stop Dilaudid PCA today  4. Continue Dilaudid 0.5mg IV every 4h prn pain not controlled by po Morphine  (decreased from 1mg yesterday). 5. Pt sees Dr Kali Daly ([facility]) for chronic pain management. 5. Anxiety - patient chronically taking klonopin twice daily. 6. I will change the timing on the Prednisone, as patient thinks if she takes 2 hours closer to bedtime, that perhaps night pain will be less. 7. Continue Klonipin now at 0.5mg bid. 8. Communicated plan of care with: Palliative IDT; [address] staff , discussed with attending MD.  Expect discharge Friday morning. Thursday will be last day that IV dilaudid used. In morning on Thursday, anticipate changing IV dilaudid to Q6 PRN.         GOALS OF CARE / TREATMENT PREFERENCES:   [====Goals of Care====]  GOALS OF CARE:  Patient / health care proxy stated goals: (pain management)  Goals not reviewed today        TREATMENT PREFERENCES:   Code Status: Full Code    Advance Care Planning:  Advance Care Planning 10/16/2017   Patient's Healthcare Decision Maker is: Legal Next of Camilo Jiménez   Primary Decision Maker Name Nedra Ponce   Primary Decision Maker Phone Number [phone]   Primary Decision Maker Relationship to Patient Parent   Confirm Advance Directive None   Patient Would Like to Complete Advance Directive -       Other:    The palliative care team has discussed with patient / health care proxy about goals of care / treatment preferences for patient.  [====Goals of Care====]         HISTORY:     HPI/SUBJECTIVE:    The patient is:   [x] Verbal and participatory  [] Non-participatory due to:   46year old female who is known to our service from previous admission. She reports one day of acute change in abdominal pain. She had been doing \"ok\" and was planning on seeing her pain management doctor on Wednesday of this week for recommendations about weaning. She doesn't think Almussafes had changed anything recently with her pain meds. She was on 60 Q8 of MS contin, morphine PCA (1mg for a while, most recently 0.5mg PCA dose) and PRN dilaudid 1mg IV . At one point they tried getting her off the PCA, but she didn't tolerate that well because it was too many pills for her to digest, so PCA had been restarted. 10/24: Pt going to work w/ PT today,awaiting to see what her long time GI physician feels is safe regarding steroid increase. Agreeable to go down on IV Dilaudid dose but fearful. 10/25  Doing really well this morning  Able to change her own dressings. Looking forward to getting home. Night pain is still worse than daytime pain, she is not sure why. Has to wait for MSIR at times in hospital, at home she sets alarm and takes on regular basis. She tapers over time by using less pills each 24hrs (8 to 7 to 6)   Clinical Pain Assessment (nonverbal scale for severity on nonverbal patients):   [++++ Clinical Pain Assessment++++]  [++++Pain Severity++++]: Pain: 7  [++++Pain Character++++]: stabbing  [++++Pain Duration++++]:   Bad for the last 24-48 hours prior to admission   [++++Pain Effect++++]: nauseated  [++++Pain Factors++++]: worse w/ movement and wound care change   [++++Pain Frequency++++]: constant  [++++Pain Location++++]: all over abdomen  [++++ Clinical Pain Assessment++++]  Duration: for how long has pt been experiencing pain (e.g., 2 days, 1 month, years)  Frequency: how often pain is an issue (e.g., several times per day, once every few days, constant)     FUNCTIONAL ASSESSMENT:     Palliative Performance Scale (PPS):  PPS: 40       PSYCHOSOCIAL/SPIRITUAL SCREENING:     Advance Care Planning:  Advance Care Planning 10/16/2017   Patient's Healthcare Decision Maker is: Legal Next of Camilo Jiménez   Primary Decision Maker Name Robbin Walters   Primary Decision Maker Phone Number [phone]   Primary Decision Maker Relationship to Patient Parent   Confirm Advance Directive None   Patient Would Like to Complete Advance Directive -        Any spiritual / Buddhism concerns:  [] Yes /  [x] No    Caregiver Burnout:  [] Yes /  [x] No /  [] No Caregiver Present      Anticipatory grief assessment:   [x] Normal  / [] Maladaptive       ESAS Anxiety: Anxiety: 5    ESAS Depression: Depression: 5        REVIEW OF SYSTEMS:     Positive and pertinent negative findings in ROS are noted above in HPI. The following systems were [x] reviewed / [] unable to be reviewed as noted in HPI  Other findings are noted below. Systems: constitutional, ears/nose/mouth/throat, respiratory, gastrointestinal, genitourinary, musculoskeletal, integumentary, neurologic, psychiatric, endocrine. Positive findings noted below. Modified ESAS Completed by: provider   Fatigue: 6 Drowsiness: 0   Depression: 5 Pain: 7   Anxiety: 5 Nausea: 3   Anorexia: 3 Dyspnea: 0     Constipation: No              PHYSICAL EXAM:     From RN flowsheet:  Wt Readings from Last 3 Encounters:   10/25/17 320 lb 12.3 oz (145.5 kg)   10/03/17 338 lb (153.3 kg)   09/12/17 338 lb (153.3 kg)     Blood pressure 97/57, pulse 77, temperature 98.2 °F (36.8 °C), resp. rate 16, height 5' 4\" (1.626 m), weight 320 lb 12.3 oz (145.5 kg), SpO2 100 %.     Pain Scale 1: Numeric (0 - 10)  Pain Intensity 1: 7  Pain Onset 1: chronic  Pain Location 1: Abdomen  Pain Orientation 1: Mid, Upper, Left, Right  Pain Description 1: Stabbing  Pain Intervention(s) 1: Medication (see MAR)  Last bowel movement, if known: today     Constitutional: pt is awake, alert, chronically ill appearing, anxious   Eyes: Clear  ENT: moist mucous membranes, nares patent  Chest: Regular rhythm   Resp: CTA, unlabored  Abd: EC fistula, no leakage, +BS. TTP. Msk: No abnormalities  Neuro: moving all extremities   Psych: anxious, but engaging            HISTORY:     Principal Problem:    SIRS (systemic inflammatory response syndrome) (HCC) (10/16/2017)    Active Problems:    Abdominal pain (6/25/2017)      Acute hyponatremia (10/16/2017)      Leukocytosis (10/16/2017)      Wound, open, anterior abdominal wall (10/16/2017)      DEAN (acute kidney injury) ([facility] (10/16/2017)      Past Medical History:   Diagnosis Date    Crohn's disease ([facility] 8/15/2011    [facility] 8/15/2011    Incarcerated ventral hernia 8/15/2011    Right flank pain 8/22/2011    Seizures ([facility]     Stroke [facility])       Past Surgical History:   Procedure Laterality Date    HX OTHER SURGICAL      multiple procedures related to her Crohn's disease    OR LAP, INCISIONAL HERNIA REPAIR,INCARCERATED  9-10-08    dr. Melo Weaver      Family History   Problem Relation Age of Onset    Hypertension Father     Stroke Father     Other Father      arthritis      History reviewed, no pertinent family history.   Social History   Substance Use Topics    Smoking status: Former Smoker    Smokeless tobacco: Not on file    Alcohol use No     Allergies   Allergen Reactions    Demerol [Meperidine] Unknown (comments)    Soma [Carisoprodol] Rash and Nausea Only    Sulfa (Sulfonamide Antibiotics) Unknown (comments)    Toradol [Ketorolac Tromethamine] Unknown (comments)      Current Facility-Administered Medications   Medication Dose Route Frequency    morphine IR (MS IR) tablet 30 mg  30 mg Oral Q3H    morphine CR (MS CONTIN) tablet 100 mg  100 mg Oral Q8H    HYDROmorphone (PF) (DILAUDID) injection 0.5 mg  0.5 mg IntraVENous Q4H PRN    potassium, sodium phosphates (NEUTRA-PHOS) packet 1 Packet  1 Packet Oral QID    sodium chloride 0.9 % bolus infusion 250 mL  250 mL IntraVENous PRN    ondansetron (ZOFRAN) injection 4 mg  4 mg IntraVENous Q6H PRN    predniSONE (DELTASONE) tablet 20 mg  20 mg Oral BID WITH MEALS    sodium chloride (NS) flush 20 mL  20 mL InterCATHeter PRN    sodium chloride (NS) flush 10 mL  10 mL InterCATHeter Q24H    sodium chloride (NS) flush 10 mL  10 mL InterCATHeter PRN    sodium chloride (NS) flush 10 mL  10 mL InterCATHeter Q8H    alteplase (CATHFLO) 1 mg in sterile water (preservative free) 1 mL injection  1 mg InterCATHeter PRN    bacitracin 500 unit/gram packet 1 Packet  1 Packet Topical PRN    insulin lispro (HUMALOG) injection   SubCUTAneous Q6H    enoxaparin (LOVENOX) injection 40 mg  40 mg SubCUTAneous Q24H    acetaminophen (TYLENOL) tablet 650 mg  650 mg Oral Q6H PRN    glucose chewable tablet 16 g  4 Tab Oral PRN    dextrose (D50W) injection syrg 12.5-25 g  12.5-25 g IntraVENous PRN    glucagon (GLUCAGEN) injection 1 mg  1 mg IntraMUSCular PRN    fat emulsion 20% (LIPOSYN, INTRAlipid) infusion 500 mL  500 mL IntraVENous Q MON, WED & FRI    clonazePAM (KlonoPIN) tablet 0.5 mg  0.5 mg Oral BID    sodium chloride (NS) flush 5-10 mL  5-10 mL IntraVENous Q8H    sodium chloride (NS) flush 5-10 mL  5-10 mL IntraVENous PRN    sodium chloride (NS) flush 5-10 mL  5-10 mL IntraVENous PRN    famotidine (PF) (PEPCID) 20 mg in sodium chloride 0.9 % 10 mL injection  20 mg IntraVENous Q12H    sodium chloride (NS) flush 5-10 mL  5-10 mL IntraVENous PRN          LAB AND IMAGING FINDINGS:     Lab Results   Component Value Date/Time    WBC 16.5 10/25/2017 03:40 AM    HGB 9.8 10/25/2017 03:40 AM    PLATELET 570 53/24/3788 03:40 AM     Lab Results   Component Value Date/Time    Sodium 136 10/25/2017 03:40 AM    Potassium 4.1 10/25/2017 03:40 AM    Chloride 105 10/25/2017 03:40 AM    CO2 24 10/25/2017 03:40 AM    BUN 24 10/25/2017 03:40 AM    Creatinine 0.59 10/25/2017 03:40 AM    Calcium 8.6 10/25/2017 03:40 AM    Magnesium 1.7 10/25/2017 03:40 AM    Phosphorus 3.5 10/25/2017 03:40 AM      Lab Results   Component Value Date/Time    AST (SGOT) 39 10/25/2017 03:40 AM    Alk. phosphatase 297 10/25/2017 03:40 AM    Protein, total 6.2 10/25/2017 03:40 AM    Albumin 2.2 10/25/2017 03:40 AM    Globulin 4.0 10/25/2017 03:40 AM     No results found for: INR, PTMR, PTP, PT1, PT2, APTT   No results found for: IRON, FE, TIBC, IBCT, PSAT, FERR   No results found for: PH, PCO2, PO2  No components found for: GLPOC   No results found for: CPK, CKMB             Total time:   Counseling / coordination time, spent as noted above:   > 50% counseling / coordination?:     Prolonged service was provided for  []30 min   []75 min in face to face time in the presence of the patient, spent as noted above. Time Start:   Time End:   Note: this can only be billed with 70837 (initial) or 17382 (follow up). If multiple start / stop times, list each separately.

## 2017-10-26 ENCOUNTER — HOME HEALTH ADMISSION (OUTPATIENT)
Dept: HOME HEALTH SERVICES | Facility: HOME HEALTH | Age: 42
End: 2017-10-26
Payer: MEDICARE

## 2017-10-26 LAB
ALBUMIN SERPL-MCNC: 2 G/DL (ref 3.5–5)
ALBUMIN/GLOB SERPL: 0.5 {RATIO} (ref 1.1–2.2)
ALP SERPL-CCNC: 262 U/L (ref 45–117)
ALT SERPL-CCNC: 76 U/L (ref 12–78)
ANION GAP SERPL CALC-SCNC: 6 MMOL/L (ref 5–15)
AST SERPL-CCNC: 36 U/L (ref 15–37)
BASOPHILS # BLD: 0 K/UL (ref 0–0.1)
BASOPHILS NFR BLD: 0 % (ref 0–1)
BILIRUB SERPL-MCNC: 0.4 MG/DL (ref 0.2–1)
BUN SERPL-MCNC: 22 MG/DL (ref 6–20)
BUN/CREAT SERPL: 35 (ref 12–20)
CALCIUM SERPL-MCNC: 8.4 MG/DL (ref 8.5–10.1)
CHLORIDE SERPL-SCNC: 104 MMOL/L (ref 97–108)
CO2 SERPL-SCNC: 27 MMOL/L (ref 21–32)
CREAT SERPL-MCNC: 0.63 MG/DL (ref 0.55–1.02)
EOSINOPHIL # BLD: 0.1 K/UL (ref 0–0.4)
EOSINOPHIL NFR BLD: 1 % (ref 0–7)
ERYTHROCYTE [DISTWIDTH] IN BLOOD BY AUTOMATED COUNT: 17.1 % (ref 11.5–14.5)
GLOBULIN SER CALC-MCNC: 4 G/DL (ref 2–4)
GLUCOSE BLD STRIP.AUTO-MCNC: 106 MG/DL (ref 65–100)
GLUCOSE BLD STRIP.AUTO-MCNC: 114 MG/DL (ref 65–100)
GLUCOSE BLD STRIP.AUTO-MCNC: 187 MG/DL (ref 65–100)
GLUCOSE BLD STRIP.AUTO-MCNC: 204 MG/DL (ref 65–100)
GLUCOSE SERPL-MCNC: 188 MG/DL (ref 65–100)
HCG UR QL: NEGATIVE
HCT VFR BLD AUTO: 28.1 % (ref 35–47)
HGB BLD-MCNC: 9 G/DL (ref 11.5–16)
LYMPHOCYTES # BLD: 2.2 K/UL (ref 0.8–3.5)
LYMPHOCYTES NFR BLD: 15 % (ref 12–49)
MAGNESIUM SERPL-MCNC: 1.5 MG/DL (ref 1.6–2.4)
MCH RBC QN AUTO: 27.8 PG (ref 26–34)
MCHC RBC AUTO-ENTMCNC: 32 G/DL (ref 30–36.5)
MCV RBC AUTO: 86.7 FL (ref 80–99)
MONOCYTES # BLD: 1.1 K/UL (ref 0–1)
MONOCYTES NFR BLD: 8 % (ref 5–13)
NEUTS SEG # BLD: 11.3 K/UL (ref 1.8–8)
NEUTS SEG NFR BLD: 76 % (ref 32–75)
PHOSPHATE SERPL-MCNC: 2.9 MG/DL (ref 2.6–4.7)
PLATELET # BLD AUTO: 205 K/UL (ref 150–400)
POTASSIUM SERPL-SCNC: 4 MMOL/L (ref 3.5–5.1)
PROT SERPL-MCNC: 6 G/DL (ref 6.4–8.2)
RBC # BLD AUTO: 3.24 M/UL (ref 3.8–5.2)
SERVICE CMNT-IMP: ABNORMAL
SODIUM SERPL-SCNC: 137 MMOL/L (ref 136–145)
WBC # BLD AUTO: 14.8 K/UL (ref 3.6–11)

## 2017-10-26 PROCEDURE — 36415 COLL VENOUS BLD VENIPUNCTURE: CPT | Performed by: INTERNAL MEDICINE

## 2017-10-26 PROCEDURE — 85025 COMPLETE CBC W/AUTO DIFF WBC: CPT | Performed by: INTERNAL MEDICINE

## 2017-10-26 PROCEDURE — 81025 URINE PREGNANCY TEST: CPT | Performed by: INTERNAL MEDICINE

## 2017-10-26 PROCEDURE — 83735 ASSAY OF MAGNESIUM: CPT | Performed by: INTERNAL MEDICINE

## 2017-10-26 PROCEDURE — 74011250637 HC RX REV CODE- 250/637: Performed by: INTERNAL MEDICINE

## 2017-10-26 PROCEDURE — 74011250636 HC RX REV CODE- 250/636: Performed by: PHYSICAL MEDICINE & REHABILITATION

## 2017-10-26 PROCEDURE — 84100 ASSAY OF PHOSPHORUS: CPT | Performed by: INTERNAL MEDICINE

## 2017-10-26 PROCEDURE — 74011250636 HC RX REV CODE- 250/636: Performed by: INTERNAL MEDICINE

## 2017-10-26 PROCEDURE — 82962 GLUCOSE BLOOD TEST: CPT

## 2017-10-26 PROCEDURE — 74011000250 HC RX REV CODE- 250: Performed by: NURSE PRACTITIONER

## 2017-10-26 PROCEDURE — 65270000029 HC RM PRIVATE

## 2017-10-26 PROCEDURE — 97530 THERAPEUTIC ACTIVITIES: CPT

## 2017-10-26 PROCEDURE — 74011250637 HC RX REV CODE- 250/637: Performed by: HOSPITALIST

## 2017-10-26 PROCEDURE — 80053 COMPREHEN METABOLIC PANEL: CPT | Performed by: INTERNAL MEDICINE

## 2017-10-26 PROCEDURE — 74011250637 HC RX REV CODE- 250/637: Performed by: PHYSICAL MEDICINE & REHABILITATION

## 2017-10-26 PROCEDURE — 74011636637 HC RX REV CODE- 636/637: Performed by: INTERNAL MEDICINE

## 2017-10-26 PROCEDURE — 74011636637 HC RX REV CODE- 636/637: Performed by: HOSPITALIST

## 2017-10-26 PROCEDURE — 74011000258 HC RX REV CODE- 258: Performed by: INTERNAL MEDICINE

## 2017-10-26 PROCEDURE — 74011250636 HC RX REV CODE- 250/636: Performed by: HOSPITALIST

## 2017-10-26 PROCEDURE — 74011000250 HC RX REV CODE- 250: Performed by: INTERNAL MEDICINE

## 2017-10-26 RX ADMIN — MORPHINE SULFATE 30 MG: 15 TABLET ORAL at 15:26

## 2017-10-26 RX ADMIN — CLONAZEPAM 0.5 MG: 0.5 TABLET ORAL at 21:13

## 2017-10-26 RX ADMIN — HYDROMORPHONE HYDROCHLORIDE 0.5 MG: 1 INJECTION, SOLUTION INTRAMUSCULAR; INTRAVENOUS; SUBCUTANEOUS at 17:47

## 2017-10-26 RX ADMIN — PREDNISONE 20 MG: 10 TABLET ORAL at 19:00

## 2017-10-26 RX ADMIN — FAMOTIDINE 20 MG: 10 INJECTION, SOLUTION INTRAVENOUS at 09:34

## 2017-10-26 RX ADMIN — MORPHINE SULFATE 100 MG: 100 TABLET, EXTENDED RELEASE ORAL at 21:13

## 2017-10-26 RX ADMIN — MORPHINE SULFATE 100 MG: 100 TABLET, EXTENDED RELEASE ORAL at 06:00

## 2017-10-26 RX ADMIN — ASCORBIC ACID, VITAMIN A PALMITATE, CHOLECALCIFEROL, THIAMINE HYDROCHLORIDE, RIBOFLAVIN-5 PHOSPHATE SODIUM, PYRIDOXINE HYDROCHLORIDE, NIACINAMIDE, DEXPANTHENOL, ALPHA-TOCOPHEROL ACETATE, VITAMIN K1, FOLIC ACID, BIOTIN, CYANOCOBALAMIN: 200; 3300; 200; 6; 3.6; 6; 40; 15; 10; 150; 600; 60; 5 INJECTION, SOLUTION INTRAVENOUS at 18:41

## 2017-10-26 RX ADMIN — MORPHINE SULFATE 30 MG: 15 TABLET ORAL at 21:14

## 2017-10-26 RX ADMIN — MORPHINE SULFATE 100 MG: 100 TABLET, EXTENDED RELEASE ORAL at 14:24

## 2017-10-26 RX ADMIN — HYDROMORPHONE HYDROCHLORIDE 0.5 MG: 1 INJECTION, SOLUTION INTRAMUSCULAR; INTRAVENOUS; SUBCUTANEOUS at 12:04

## 2017-10-26 RX ADMIN — MORPHINE SULFATE 30 MG: 15 TABLET ORAL at 05:59

## 2017-10-26 RX ADMIN — MORPHINE SULFATE 30 MG: 15 TABLET ORAL at 12:03

## 2017-10-26 RX ADMIN — Medication 10 ML: at 21:14

## 2017-10-26 RX ADMIN — HYDROMORPHONE HYDROCHLORIDE 0.5 MG: 1 INJECTION, SOLUTION INTRAMUSCULAR; INTRAVENOUS; SUBCUTANEOUS at 23:57

## 2017-10-26 RX ADMIN — ENOXAPARIN SODIUM 40 MG: 40 INJECTION SUBCUTANEOUS at 12:07

## 2017-10-26 RX ADMIN — INSULIN LISPRO 2 UNITS: 100 INJECTION, SOLUTION INTRAVENOUS; SUBCUTANEOUS at 06:01

## 2017-10-26 RX ADMIN — ASCORBIC ACID, VITAMIN A PALMITATE, CHOLECALCIFEROL, THIAMINE HYDROCHLORIDE, RIBOFLAVIN-5 PHOSPHATE SODIUM, PYRIDOXINE HYDROCHLORIDE, NIACINAMIDE, DEXPANTHENOL, ALPHA-TOCOPHEROL ACETATE, VITAMIN K1, FOLIC ACID, BIOTIN, CYANOCOBALAMIN: 200; 3300; 200; 6; 3.6; 6; 40; 15; 10; 150; 600; 60; 5 INJECTION, SOLUTION INTRAVENOUS at 19:39

## 2017-10-26 RX ADMIN — SODIUM CHLORIDE 250 ML: 900 INJECTION, SOLUTION INTRAVENOUS at 20:11

## 2017-10-26 RX ADMIN — SODIUM CHLORIDE 250 ML: 900 INJECTION, SOLUTION INTRAVENOUS at 03:48

## 2017-10-26 RX ADMIN — MORPHINE SULFATE 30 MG: 15 TABLET ORAL at 23:57

## 2017-10-26 RX ADMIN — CLONAZEPAM 0.5 MG: 0.5 TABLET ORAL at 09:33

## 2017-10-26 RX ADMIN — MORPHINE SULFATE 30 MG: 15 TABLET ORAL at 09:33

## 2017-10-26 RX ADMIN — Medication 10 ML: at 12:07

## 2017-10-26 RX ADMIN — Medication 10 ML: at 14:25

## 2017-10-26 RX ADMIN — INSULIN LISPRO 3 UNITS: 100 INJECTION, SOLUTION INTRAVENOUS; SUBCUTANEOUS at 23:58

## 2017-10-26 RX ADMIN — ONDANSETRON 4 MG: 2 INJECTION INTRAMUSCULAR; INTRAVENOUS at 17:47

## 2017-10-26 RX ADMIN — Medication 10 ML: at 06:02

## 2017-10-26 RX ADMIN — FAMOTIDINE 20 MG: 10 INJECTION, SOLUTION INTRAVENOUS at 21:13

## 2017-10-26 RX ADMIN — MORPHINE SULFATE 30 MG: 15 TABLET ORAL at 17:47

## 2017-10-26 RX ADMIN — MORPHINE SULFATE 30 MG: 15 TABLET ORAL at 02:38

## 2017-10-26 RX ADMIN — HYDROMORPHONE HYDROCHLORIDE 0.5 MG: 1 INJECTION, SOLUTION INTRAMUSCULAR; INTRAVENOUS; SUBCUTANEOUS at 07:58

## 2017-10-26 RX ADMIN — Medication 10 ML: at 06:01

## 2017-10-26 RX ADMIN — HYDROMORPHONE HYDROCHLORIDE 0.5 MG: 1 INJECTION, SOLUTION INTRAMUSCULAR; INTRAVENOUS; SUBCUTANEOUS at 03:48

## 2017-10-26 RX ADMIN — Medication 10 ML: at 21:12

## 2017-10-26 RX ADMIN — PREDNISONE 20 MG: 10 TABLET ORAL at 06:00

## 2017-10-26 NOTE — PROGRESS NOTES
CM received response from [employer] and patient is approved for 100% coverage. CM awaiting specific recipe for TPN and determination if indicated for HCP to manage TPN to fax to HCP. Orders received for home health and referral sent to [facility]. Orders received for portable suction machine and referral sent to [facility]. CM met with patient today during rounds. She will have friends to assist her during the day while her mom is at work. CM spoke with Coby Paez RN [facility]) and she has placed a call to Dr. Quentin Ramsey to sign off on [facility] treatment plan. Referral for home health is pending at this time. CM continuing to follow.   YOLANDA GilmoreW, CRM

## 2017-10-26 NOTE — DIABETES MGMT
DTC Consult Note    Recommendations/ Comments: Patient reports that she was told she needs to monitor her blood sugar BID while on TPN. Provided her with a One Touch Verio Flex and instructed on use. Also, reviewed no concentrated sweet meal plan for when she is eating and drinking. Consult received for:  [x]             General diabetes education                [x]             Meter/monitoring    Chart reviewed and initial evaluation complete on Octavia Kwong. Patient is a 46 y.o. female with hyperglycemia secondary to TPN and steroids. Assessed and instructed patient on the following:   ·  blood sugar goals, SMBG skills and nutrition    Encouraged the following:   · dietary modifications: eliminate sweet drinks, regular blood sugar monitorin times daily    Provided patient with the following: [x]             Survival skills education materials               [x]             Outpatient DTC contact number               [x]             Glucometer               Patient was able to give return demonstration of    [x]       Glucometer    Discussed with patient and/or family need for follow up appointment for diabetes management after discharge. A1c:   No results found for: HBA1C, HGBE8, NUC6KRRF    Recent Glucose Results: Lab Results   Component Value Date/Time     (H) 10/26/2017 02:50 AM    GLUCPOC 114 (H) 10/26/2017 11:53 AM    GLUCPOC 187 (H) 10/26/2017 05:44 AM    GLUCPOC 210 (H) 10/25/2017 11:44 PM        Lab Results   Component Value Date/Time    Creatinine 0.63 10/26/2017 02:50 AM     Estimated Creatinine Clearance: 172 mL/min (based on Cr of 0.63). Active Orders   Diet    DIET MECHANICAL SOFT No Conc.  Sweets        PO intake: Patient Vitals for the past 72 hrs:   % Diet Eaten   10/26/17 1428 75 %   10/26/17 0926 25 %   10/25/17 1757 50 %   10/25/17 1002 15 %   10/24/17 1428 75 %   10/24/17 0900 50 %       Current hospital DM medication: Regular insulin in TPN - 15 units/L, 36 units delivered, Humalog for correction, normal sensitivity    Will continue to follow as needed. Thank you.   Fozia Mtz MS, RN, CDE

## 2017-10-26 NOTE — PROGRESS NOTES
Problem: Mobility Impaired (Adult and Pediatric)  Goal: *Acute Goals and Plan of Care (Insert Text)  Physical Therapy Goals  Initiated 10/24/2017  1. Patient will move from supine to sit and sit to supine  in bed with modified independence within 7 day(s). 4.  Patient will ambulate with independence for 300 feet with the least restrictive device within 7 day(s). 5.  Patient will ascend/descend 2 stairs with 2 handrail(s) with supervision/set-up within 7 day(s). physical Therapy TREATMENT/DISCHARGE  Patient: Isis Bridges (46 y.o. female)  Date: 10/26/2017  Diagnosis: SIRS (systemic inflammatory response syndrome) ([facility])  Leukocytosis  Abdominal pain  DEAN (acute kidney injury) ([facility]  Acute hyponatremia  Wound, open, anterior abdominal wall  Poor Venous Access SIRS (systemic inflammatory response syndrome) ([facility])  Procedure(s) (LRB):  INFUSION CATHETER INSERTION (N/A) 9 Days Post-Op  Precautions:      ASSESSMENT:  Patient received in bed and agreeable to therapy. Managed changing out drain to woodruff from suction independently. Moving well with bed mobility and transfers. Ambulated in dick with steady gait. Discussed stair management at home and patient feel completely comfortable being able to manage as family will be available to assist as well has a cane. Feel that patient is safe to return home at discharge. No further skilled PT needs and will sign off. Progression toward goals:  []      Improving appropriately and progressing toward goals  []      Improving slowly and progressing toward goals  []      Not making progress toward goals and plan of care will be adjusted     PLAN:  Patient will be discharged from physical therapy at this time.   Rationale for discharge:  [] Goals Achieved  [] [address]  [] Patient not participating in therapy  [] Other:  Discharge Recommendations:  None  Further Equipment Recommendations for Discharge:  none     SUBJECTIVE:   Patient stated I feel good.    OBJECTIVE DATA SUMMARY:   Critical Behavior:  Neurologic State: Alert  Orientation Level: Oriented X4  Cognition: Follows commands  Safety/Judgement: Awareness of environment, Insight into deficits, Home safety, Good awareness of safety precautions  Functional Mobility Training:  Bed Mobility:     Supine to Sit: Modified independent     Scooting: Independent        Transfers:  Sit to Stand: Independent  Stand to Sit: Independent                             Balance:  Sitting: Intact  Standing: Intact  Ambulation/Gait Training:  Distance (ft): 300 Feet (ft)     Ambulation - Level of Assistance: Modified independent                 Base of Support: Widened                           Pain:  Pain Scale 1: Numeric (0 - 10)  Pain Intensity 1: 8  Pain Location 1: Abdomen  Pain Orientation 1: Mid;Upper  Pain Description 1: Stabbing  Pain Intervention(s) 1: Medication (see MAR)  After treatment:   [x] Patient left in no apparent distress sitting up in chair  [] Patient left in no apparent distress in bed  [x] Call bell left within reach  [x] Nursing notified  [] Caregiver present  [] Bed alarm activated    COMMUNICATION/COLLABORATION:   The patients plan of care was discussed with: Registered Nurse and     Pauline Todd PT   Time Calculation: 10 mins

## 2017-10-26 NOTE — PROGRESS NOTES
Bedside shift change report given to David Sloan RN (oncoming nurse) by Markell Pablo RN (offgoing nurse). Report included the following information SBAR and Kardex.

## 2017-10-26 NOTE — CONSULTS
Palliative Medicine Consult  Heath: [phone] ([phone])    Patient Name: Graham Truong  YOB: 1975    Date of Initial Consult: 10/16/17  Reason for Consult: pain management  Requesting Provider: Dr. Cira Gregorio  Primary Care Physician: Eliezer Vasques MD      SUMMARY:   Graham Truong is a 46 y.o. with a past history of Crohn's disease (followed by Dr Kati Lopez), multiple surgeries s/p total colectomy with end ileostomy, 6/7/17 exp lap w SB perf, 6/28/17 exp lap, lysis of adhesions, repair of enterotomy, SMA stent on L, 7/16/17 exp lap, lysis of adhesions, fistula exclusion with feeding tube placement, frozen abdomen/ wound vac assisted closure placement, prolonged TPN, MRSA wound infection, awaiting repair of EC fistula planned for Jan 2018 if stable for surgery (an none of her home Crohn's meds until after healed) who was admitted on 10/15/2017 from [facility] with a diagnosis of acute on chronic abdominal pain. CT abd/pelvis done. Thought to be from Crohn's flare and GI started on steroids. Has not been on her home Crohn's medication since earlier this year due to recurrent infections. Current medical issues leading to Palliative Medicine involvement include: pain management. Patient's mother, Veronica Orta is NOK.  reviewed, no abberrancies- one prescriber for opioids and one pharmacy. PALLIATIVE DIAGNOSES:   1. Acute on chronic abdominal pain  2. Crohn's disease ? Flare  3. Chronic anxiety and depression, chronic benzo use  4. Debility, generalized muscle wasting  5. Anxiety- on klonipin at home   6. Nausea- multifactorial       PLAN:   1. Pain -  Thought to be Crohn's flare, GI started on steroids- awaiting to see if primary GI feels safe to incr Prednisone further (now on 40mg daily). Tried Fentanyl patch to 100mcg here w/out any benefit. 2. Cont to encourage pt that she will improve- and that we need to wean medications. Will avoid another stay at [facility] and likely discharge home tmrw! 3. Pain medication adjustments:   1. Continue  MSContin to 100mg every 8h. See this as a temporary measure in order to wean off IV. Will go back to home MScontin 60mg every 8 soon. 2. Morphine IR tablets 30mg every 3h scheduled here, but prn at home. At home she will taper by decreasing from 8 to  7 to 6 per day over time. This will likely take 1-2 weeks. 3. Continue Dilaudid 0.5mg IV every 4h prn pain not controlled by po Morphine. 4. Pt sees Dr Nancy Barajas ([facility]) for chronic pain management. As above,  w/out aberrancies. She will have follow up soon- hopefully w/in 2 weeks. 5. Will discuss w/ attending- need to ensure pt does not go into withdrawal and has enough pain medication to taper and not end up in ED w/ uncontrolled sx. No signs of somnolence, confusion. No signs of opioid misuse. 6. While our team typically only writes Rx upon discharge for clinic pts (and pt does not meet this criteria), as she has good follow up, known pain etiology, and has no red flags- I will consider writing this Rx if needed. I will write # pills based on her next f/u with Dr Anson Contreras. Communicated plan of care with: Palliative IDT; 6E staff    Addendum: Leeanne Gajustice w/ mom Alexi Orosco- pt has appt w/ Dr Anson Contreras for pain management on Nov 7. If discharged tmrw would send her w/ 15 days worth of Rx ~ MScontin 100mg tab tid, Disp 45 tablets. Morphine IR 30mg tabs every 3h prn, Disp 150 tabs.         GOALS OF CARE / TREATMENT PREFERENCES:   [====Goals of Care====]  GOALS OF CARE:  Patient / health care proxy stated goals: (pain management)  Goals not reviewed today        TREATMENT PREFERENCES:   Code Status: Full Code    Advance Care Planning:  Advance Care Planning 10/16/2017   Patient's Healthcare Decision Maker is: Legal Next of Camilo 71   Primary Decision Maker Name Mary Antoine   Primary Decision Maker Phone Number [phone]   Primary Decision Maker Relationship to Patient Parent   Confirm Advance Directive None Patient Would Like to Complete Advance Directive -       Other:    The palliative care team has discussed with patient / health care proxy about goals of care / treatment preferences for patient.  [====Goals of Care====]         HISTORY:     HPI/SUBJECTIVE:    The patient is:   [x] Verbal and participatory  [] Non-participatory due to:   46year old female who is known to our service from previous admission. She reports one day of acute change in abdominal pain. She had been doing \"ok\" and was planning on seeing her pain management doctor on Wednesday of this week for recommendations about weaning. She doesn't think Cindy Munguia had changed anything recently with her pain meds. She was on 60 Q8 of MS contin, morphine PCA (1mg for a while, most recently 0.5mg PCA dose) and PRN dilaudid 1mg IV . At one point they tried getting her off the PCA, but she didn't tolerate that well because it was too many pills for her to digest, so PCA had been restarted. 10/26-   Pt doing well, eager to get home. Feels the scheduled pain meds really helped- as she does not have to wait 30 min after ringing out. Clinical Pain Assessment (nonverbal scale for severity on nonverbal patients):   [++++ Clinical Pain Assessment++++]  [++++Pain Severity++++]: Pain: 7  [++++Pain Character++++]: stabbing  [++++Pain Duration++++]:   Bad for the last 24-48 hours prior to admission   [++++Pain Effect++++]: nauseated  [++++Pain Factors++++]: worse w/ movement and wound care change   [++++Pain Frequency++++]: constant  [++++Pain Location++++]: all over abdomen  [++++ Clinical Pain Assessment++++]  Duration: for how long has pt been experiencing pain (e.g., 2 days, 1 month, years)  Frequency: how often pain is an issue (e.g., several times per day, once every few days, constant)     FUNCTIONAL ASSESSMENT:     Palliative Performance Scale (PPS):  PPS: 50       PSYCHOSOCIAL/SPIRITUAL SCREENING:     Advance Care Planning:  Advance Care Planning 10/16/2017   Patient's Healthcare Decision Maker is: Legal Next of Camilo Jiménez   Primary Decision Maker Name Albania Lim   Primary Decision Maker Phone Number [phone]   Primary Decision Maker Relationship to Patient Parent   Confirm Advance Directive None   Patient Would Like to Complete Advance Directive -        Any spiritual / Restorationist concerns:  [] Yes /  [x] No    Caregiver Burnout:  [] Yes /  [x] No /  [] No Caregiver Present      Anticipatory grief assessment:   [x] Normal  / [] Maladaptive       ESAS Anxiety: Anxiety: 5    ESAS Depression: Depression: 5        REVIEW OF SYSTEMS:     Positive and pertinent negative findings in ROS are noted above in HPI. The following systems were [x] reviewed / [] unable to be reviewed as noted in HPI  Other findings are noted below. Systems: constitutional, ears/nose/mouth/throat, respiratory, gastrointestinal, genitourinary, musculoskeletal, integumentary, neurologic, psychiatric, endocrine. Positive findings noted below. Modified ESAS Completed by: provider   Fatigue: 6 Drowsiness: 0   Depression: 5 Pain: 7   Anxiety: 5 Nausea: 3   Anorexia: 3 Dyspnea: 0     Constipation: No              PHYSICAL EXAM:     From RN flowsheet:  Wt Readings from Last 3 Encounters:   10/26/17 320 lb 1.7 oz (145.2 kg)   10/03/17 338 lb (153.3 kg)   09/12/17 338 lb (153.3 kg)     Blood pressure 116/68, pulse 76, temperature 98.2 °F (36.8 °C), resp. rate 16, height 5' 4\" (1.626 m), weight 320 lb 1.7 oz (145.2 kg), SpO2 100 %.     Pain Scale 1: Numeric (0 - 10)  Pain Intensity 1: 8  Pain Onset 1: chronic  Pain Location 1: Abdomen  Pain Orientation 1: Mid, Upper  Pain Description 1: Stabbing  Pain Intervention(s) 1: Medication (see MAR)  Last bowel movement, if known: today     Constitutional: pt is awake, alert, chronically ill appearing, anxious but engages well  Eyes: Clear  ENT: moist mucous membranes, nares patent  Chest: Regular rhythm   Resp: CTA, unlabored  Abd: EC fistula, no leakage, +BS. TTP. Msk: No abnormalities  Neuro: moving all extremities   Psych: a bit anxious, appreciative          HISTORY:     Principal Problem:    SIRS (systemic inflammatory response syndrome) (HCC) (10/16/2017)    Active Problems:    Abdominal pain (6/25/2017)      Acute hyponatremia (10/16/2017)      Leukocytosis (10/16/2017)      Wound, open, anterior abdominal wall (10/16/2017)      DEAN (acute kidney injury) ([facility] (10/16/2017)      Past Medical History:   Diagnosis Date    Crohn's disease ([facility] 8/15/2011    DVT (deep venous thrombosis) ([facility] 8/15/2011    Incarcerated ventral hernia 8/15/2011    Right flank pain 8/22/2011    Seizures ([facility]     Stroke [facility])       Past Surgical History:   Procedure Laterality Date    HX OTHER SURGICAL      multiple procedures related to her Crohn's disease    OH LAP, INCISIONAL HERNIA REPAIR,INCARCERATED  9-10-08    dr. Marie Zaragoza      Family History   Problem Relation Age of Onset    Hypertension Father     Stroke Father     Other Father      arthritis      History reviewed, no pertinent family history.   Social History   Substance Use Topics    Smoking status: Former Smoker    Smokeless tobacco: Not on file    Alcohol use No     Allergies   Allergen Reactions    Demerol [Meperidine] Unknown (comments)    Soma [Carisoprodol] Rash and Nausea Only    Sulfa (Sulfonamide Antibiotics) Unknown (comments)    Toradol [Ketorolac Tromethamine] Unknown (comments)      Current Facility-Administered Medications   Medication Dose Route Frequency    TPN ADULT - CENTRAL   IntraVENous CONTINUOUS    morphine IR (MS IR) tablet 30 mg  30 mg Oral Q3H    predniSONE (DELTASONE) tablet 20 mg  20 mg Oral BID    morphine CR (MS CONTIN) tablet 100 mg  100 mg Oral Q8H    HYDROmorphone (PF) (DILAUDID) injection 0.5 mg  0.5 mg IntraVENous Q4H PRN    sodium chloride 0.9 % bolus infusion 250 mL  250 mL IntraVENous PRN    ondansetron (ZOFRAN) injection 4 mg  4 mg IntraVENous Q6H PRN    sodium chloride (NS) flush 20 mL  20 mL InterCATHeter PRN    sodium chloride (NS) flush 10 mL  10 mL InterCATHeter Q24H    sodium chloride (NS) flush 10 mL  10 mL InterCATHeter PRN    sodium chloride (NS) flush 10 mL  10 mL InterCATHeter Q8H    alteplase (CATHFLO) 1 mg in sterile water (preservative free) 1 mL injection  1 mg InterCATHeter PRN    bacitracin 500 unit/gram packet 1 Packet  1 Packet Topical PRN    insulin lispro (HUMALOG) injection   SubCUTAneous Q6H    enoxaparin (LOVENOX) injection 40 mg  40 mg SubCUTAneous Q24H    acetaminophen (TYLENOL) tablet 650 mg  650 mg Oral Q6H PRN    glucose chewable tablet 16 g  4 Tab Oral PRN    dextrose (D50W) injection syrg 12.5-25 g  12.5-25 g IntraVENous PRN    glucagon (GLUCAGEN) injection 1 mg  1 mg IntraMUSCular PRN    fat emulsion 20% (LIPOSYN, INTRAlipid) infusion 500 mL  500 mL IntraVENous Q MON, WED & FRI    clonazePAM (KlonoPIN) tablet 0.5 mg  0.5 mg Oral BID    sodium chloride (NS) flush 5-10 mL  5-10 mL IntraVENous Q8H    sodium chloride (NS) flush 5-10 mL  5-10 mL IntraVENous PRN    sodium chloride (NS) flush 5-10 mL  5-10 mL IntraVENous PRN    famotidine (PF) (PEPCID) 20 mg in sodium chloride 0.9 % 10 mL injection  20 mg IntraVENous Q12H    sodium chloride (NS) flush 5-10 mL  5-10 mL IntraVENous PRN          LAB AND IMAGING FINDINGS:     Lab Results   Component Value Date/Time    WBC 14.8 10/26/2017 02:50 AM    HGB 9.0 10/26/2017 02:50 AM    PLATELET 188 41/14/7421 02:50 AM     Lab Results   Component Value Date/Time    Sodium 137 10/26/2017 02:50 AM    Potassium 4.0 10/26/2017 02:50 AM    Chloride 104 10/26/2017 02:50 AM    CO2 27 10/26/2017 02:50 AM    BUN 22 10/26/2017 02:50 AM    Creatinine 0.63 10/26/2017 02:50 AM    Calcium 8.4 10/26/2017 02:50 AM    Magnesium 1.5 10/26/2017 02:50 AM    Phosphorus 2.9 10/26/2017 02:50 AM      Lab Results   Component Value Date/Time    AST (SGOT) 36 10/26/2017 02:50 AM    Alk.  phosphatase 262 10/26/2017 02:50 AM    Protein, total 6.0 10/26/2017 02:50 AM    Albumin 2.0 10/26/2017 02:50 AM    Globulin 4.0 10/26/2017 02:50 AM     No results found for: INR, PTMR, PTP, PT1, PT2, APTT   No results found for: IRON, FE, TIBC, IBCT, PSAT, FERR   No results found for: PH, PCO2, PO2  No components found for: GLPOC   No results found for: CPK, CKMB             Total time:   Counseling / coordination time, spent as noted above:   > 50% counseling / coordination?:     Prolonged service was provided for  []30 min   []75 min in face to face time in the presence of the patient, spent as noted above. Time Start:   Time End:   Note: this can only be billed with 03512 (initial) or 64844 (follow up). If multiple start / stop times, list each separately.

## 2017-10-26 NOTE — PROGRESS NOTES
Follow up visit with Marycruz Santiago. Offered emotional support as pt reflected on the plan for her to be discharged home tomorrow, sharing her hopes and expectations. She has good support from her mom and shared of friends who plan to visit her. Mostly, she is excited about being with her dog Gravity. Offered words of blessing and assurance of prayers. No additional needs expressed at this time.     Bhumika Smith Maria M, Palliative

## 2017-10-26 NOTE — PROGRESS NOTES
Hospitalist Progress Note  Lisy Gomez MD  Answering service: [phone] from in house phone      Date of Service:  10/26/2017  NAME:  River Orellana  :  1975  MRN:  [id number]      Admission Summary:   47 yo woman with Crohn disease, h/o DVT, incarcerated ventral hernia, chronic abdominal pain on Dilaudid PCA, morbid obesity, seizures, stroke and multiple repeat abdominal surgeries was BIBEMS to the ED from [place] [id number] on 10/15/17 with abdominal pain, nausea/vomiting. She was admitted with SIRS and intractable abdominal pain. Interval history / Subjective:   Still c/o usual abdominal pain; seen with nurse and CM     Assessment & Plan:     Acute on chronic abdominal pain (POA)   - unclear etiology: may be a Crohn flare vs worsening of chronic pain  - CT abdomen/pelvis 10/15 large anterior abdominal wound, and a site of previous/known enterocutaneous fistula though this is difficult to assess given lack of oral and IV contrastmaterial. Mild stranding around the anterior abdominal wound as well as in the abdominal mesentery.  Nonspecific wall thickening/inflammatory appearance of the duodenum.  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 - local wound care     Systemic inflammatory response syndrome (POA)   - likely due to Crohn flare, intraabdominal infection possible  - BCx 10/15 negative  - s/p levofloxacin 10/15-10/17, vancomycin 10/15-10/18, Zosyn 10/15-10/20     Hyponatremia - resolved with IVF   Hypokalemia, hypomagnesemia, hypophosphatemia - replete prn in TPN  Hypercalcemia - mild, likely due to TPN; improved after adjusting TPN     Hyperglycemia - due to TPN; increased insulin in TPN to 15 units/L with better glycemic control     DEAN - pre-renal, resolved with IV fluids     Chronic pain syndrome - Palliative care following and managing pain; still on Dilaudid PCA     Morbid obesity, Body mass index is 54.95 kg/(m^2). Nutrition - TPN per Nutrition recs; will need General Surgery/Dr Delvin Montilla to manage TPN outpatient    Code status: Full  DVT prophylaxis: enoxaparin SC    Care Plan discussed with: Patient/Family, Nurse and   Disposition: Came from [facility]; will d/c home tomorrow with [facility] for TPN, wound care     Hospital Problems  Date Reviewed: 10/16/2017          Codes Class Noted POA    Acute hyponatremia ICD-10-CM: E87.1  ICD-9-CM: 276.1  10/16/2017 Unknown        Leukocytosis ICD-10-CM: D72.829  ICD-9-CM: 288.60  10/16/2017 Unknown        Wound, open, anterior abdominal wall ICD-10-CM: S31.109A  ICD-9-CM: 879.2  10/16/2017 Unknown        * (Principal)SIRS (systemic inflammatory response syndrome) ([facility] ICD-10-CM: R65.10  ICD-9-CM: 995.90  10/16/2017 Unknown        DEAN (acute kidney injury) ([facility] ICD-10-CM: N17.9  ICD-9-CM: 584.9  10/16/2017 Unknown        Abdominal pain ICD-10-CM: R10.9  ICD-9-CM: 789.00  6/25/2017 Unknown            Review of Systems:   Pertinent items are noted in HPI. Vital Signs:    Last 24hrs VS reviewed since prior progress note.  Most recent are:  Visit Vitals    /59 (BP 1 Location: Left arm, BP Patient Position: At rest)    Pulse 71    Temp 97.7 °F (36.5 °C)    Resp 18    Ht 5' 4\" (1.626 m)    Wt 145.2 kg (320 lb 1.7 oz)    SpO2 98%    BMI 54.95 kg/m2       Intake/Output Summary (Last 24 hours) at 10/26/17 0929  Last data filed at 10/26/17 0801   Gross per 24 hour   Intake              934 ml   Output             6650 ml   Net            -5716 ml      Physical Examination:     Constitutional:  awake, no acute distress, cooperative, obese   ENT:  oral mucosa moist, oropharynx benign  Neck supple, no masses   Resp:  CTA bilaterally, no wheezing/rhonchi/rales   CV:  regular rhythm, normal rate, no m/r/g appreciated    GI:  +BS, soft, non distended, non tender, obese, ostomies x2     Musculoskeletal:  moves all extremities    Neurologic:  AAOx3, NFD     Skin:  multiple ostomies, multiple tattoos  Eyes:  PERRL    Data Review:    Review and/or order of clinical lab test  Review and/or order of tests in the radiology section of CPT  Review and/or order of tests in the medicine section of [facility]    Labs:     Recent Labs      10/26/17   0250  10/25/17   0340   WBC  14.8*  16.5*   HGB  9.0*  9.8*   HCT  28.1*  29.2*   PLT  205  233     Recent Labs      10/26/17   0250  10/25/17   0340  10/24/17   0549   NA  137  136  136   K  4.0  4.1  3.8   CL  104  105  105   CO2  27  24  23   BUN  22*  24*  24*   CREA  0.63  0.59  0.61   GLU  188*  166*  168*   CA  8.4*  8.6  8.8   MG  1.5*  1.7  1.8   PHOS  2.9  3.5   --      Recent Labs      10/26/17   0250  10/25/17   0340   SGOT  36  39*   ALT  76  73   AP  262*  297*   TBILI  0.4  0.5   TP  6.0*  6.2*   ALB  2.0*  2.2*   GLOB  4.0  4.0     No results for input(s): INR, PTP, APTT in the last 72 hours. No lab exists for component: INREXT, INREXT   No results for input(s): FE, TIBC, PSAT, FERR in the last 72 hours. No results found for: FOL, RBCF   No results for input(s): PH, PCO2, PO2 in the last 72 hours. No results for input(s): CPK, CKNDX, TROIQ in the last 72 hours.     No lab exists for component: CPKMB  No results found for: CHOL, CHOLX, CHLST, CHOLV, HDL, LDL, LDLC, DLDLP, TGLX, TRIGL, TRIGP, CHHD, [facility]  Lab Results   Component Value Date/Time    Glucose (POC) 187 10/26/2017 05:44 AM    Glucose (POC) 210 10/25/2017 11:44 PM    Glucose (POC) 105 10/25/2017 06:19 PM    Glucose (POC) 153 10/25/2017 12:00 PM    Glucose (POC) 194 10/25/2017 06:23 AM     Lab Results   Component Value Date/Time    Color YELLOW/STRAW 10/15/2017 11:02 PM    Appearance CLOUDY 10/15/2017 11:02 PM    Specific gravity 1.019 10/15/2017 11:02 PM    Specific gravity 1.020 07/07/2009 07:20 PM    pH (UA) 5.5 10/15/2017 11:02 PM    Protein TRACE 10/15/2017 11:02 PM    Glucose NEGATIVE  10/15/2017 11:02 PM    Ketone NEGATIVE  10/15/2017 11:02 PM    Bilirubin NEGATIVE  10/15/2017 11:02 PM    Urobilinogen 0.2 10/15/2017 11:02 PM    Nitrites NEGATIVE  10/15/2017 11:02 PM    Leukocyte Esterase MODERATE 10/15/2017 11:02 PM    Epithelial cells FEW 10/15/2017 11:02 PM    Bacteria NEGATIVE  10/15/2017 11:02 PM    WBC 5-10 10/15/2017 11:02 PM    RBC 5-10 10/15/2017 11:02 PM     Medications Reviewed:     Current Facility-Administered Medications   Medication Dose Route Frequency    TPN ADULT - CENTRAL   IntraVENous CONTINUOUS    morphine IR (MS IR) tablet 30 mg  30 mg Oral Q3H    predniSONE (DELTASONE) tablet 20 mg  20 mg Oral BID    morphine CR (MS CONTIN) tablet 100 mg  100 mg Oral Q8H    HYDROmorphone (PF) (DILAUDID) injection 0.5 mg  0.5 mg IntraVENous Q4H PRN    sodium chloride 0.9 % bolus infusion 250 mL  250 mL IntraVENous PRN    ondansetron (ZOFRAN) injection 4 mg  4 mg IntraVENous Q6H PRN    sodium chloride (NS) flush 20 mL  20 mL InterCATHeter PRN    sodium chloride (NS) flush 10 mL  10 mL InterCATHeter Q24H    sodium chloride (NS) flush 10 mL  10 mL InterCATHeter PRN    sodium chloride (NS) flush 10 mL  10 mL InterCATHeter Q8H    alteplase (CATHFLO) 1 mg in sterile water (preservative free) 1 mL injection  1 mg InterCATHeter PRN    bacitracin 500 unit/gram packet 1 Packet  1 Packet Topical PRN    insulin lispro (HUMALOG) injection   SubCUTAneous Q6H    enoxaparin (LOVENOX) injection 40 mg  40 mg SubCUTAneous Q24H    acetaminophen (TYLENOL) tablet 650 mg  650 mg Oral Q6H PRN    glucose chewable tablet 16 g  4 Tab Oral PRN    dextrose (D50W) injection syrg 12.5-25 g  12.5-25 g IntraVENous PRN    glucagon (GLUCAGEN) injection 1 mg  1 mg IntraMUSCular PRN    fat emulsion 20% (LIPOSYN, INTRAlipid) infusion 500 mL  500 mL IntraVENous Q MON, WED & FRI    clonazePAM (KlonoPIN) tablet 0.5 mg  0.5 mg Oral BID    sodium chloride (NS) flush 5-10 mL  5-10 mL IntraVENous Q8H    sodium chloride (NS) flush 5-10 mL  5-10 mL IntraVENous PRN    sodium chloride (NS) flush 5-10 mL  5-10 mL IntraVENous PRN    famotidine (PF) (PEPCID) 20 mg in sodium chloride 0.9 % 10 mL injection  20 mg IntraVENous Q12H    sodium chloride (NS) flush 5-10 mL  5-10 mL IntraVENous PRN     ______________________________________________________________________  EXPECTED LENGTH OF STAY: 3d 14h  ACTUAL LENGTH OF STAY:          10                 Desire Hunter MD

## 2017-10-27 ENCOUNTER — HOSPITAL ENCOUNTER (EMERGENCY)
Age: 42
Discharge: HOME | End: 2017-10-27
Attending: STUDENT IN AN ORGANIZED HEALTH CARE EDUCATION/TRAINING PROGRAM
Payer: MEDICARE

## 2017-10-27 ENCOUNTER — HOME CARE VISIT (OUTPATIENT)
Dept: SCHEDULING | Facility: HOME HEALTH | Age: 42
End: 2017-10-27
Payer: MEDICARE

## 2017-10-27 VITALS
OXYGEN SATURATION: 100 % | HEART RATE: 65 BPM | HEIGHT: 64 IN | TEMPERATURE: 98.3 F | RESPIRATION RATE: 16 BRPM | WEIGHT: 293 LBS | SYSTOLIC BLOOD PRESSURE: 109 MMHG | DIASTOLIC BLOOD PRESSURE: 59 MMHG | BODY MASS INDEX: 50.02 KG/M2

## 2017-10-27 VITALS
DIASTOLIC BLOOD PRESSURE: 81 MMHG | OXYGEN SATURATION: 95 % | SYSTOLIC BLOOD PRESSURE: 129 MMHG | TEMPERATURE: 97.9 F | HEART RATE: 84 BPM | RESPIRATION RATE: 16 BRPM

## 2017-10-27 DIAGNOSIS — T82.898A OCCLUDED PICC LINE, INITIAL ENCOUNTER (HCC): Primary | ICD-10-CM

## 2017-10-27 PROBLEM — E66.01 MORBID OBESITY (HCC): Chronic | Status: ACTIVE | Noted: 2017-10-27

## 2017-10-27 PROBLEM — E87.1 ACUTE HYPONATREMIA: Status: RESOLVED | Noted: 2017-10-16 | Resolved: 2017-10-27

## 2017-10-27 PROBLEM — N17.9 AKI (ACUTE KIDNEY INJURY) (HCC): Status: RESOLVED | Noted: 2017-10-16 | Resolved: 2017-10-27

## 2017-10-27 PROBLEM — R65.10 SIRS (SYSTEMIC INFLAMMATORY RESPONSE SYNDROME) (HCC): Status: RESOLVED | Noted: 2017-10-16 | Resolved: 2017-10-27

## 2017-10-27 LAB
ALBUMIN SERPL-MCNC: 2.3 G/DL (ref 3.5–5)
ALBUMIN/GLOB SERPL: 0.5 {RATIO} (ref 1.1–2.2)
ALP SERPL-CCNC: 263 U/L (ref 45–117)
ALT SERPL-CCNC: 89 U/L (ref 12–78)
ANION GAP SERPL CALC-SCNC: 8 MMOL/L (ref 5–15)
AST SERPL-CCNC: 35 U/L (ref 15–37)
BASOPHILS # BLD: 0 K/UL (ref 0–0.1)
BASOPHILS NFR BLD: 0 % (ref 0–1)
BILIRUB SERPL-MCNC: 0.5 MG/DL (ref 0.2–1)
BUN SERPL-MCNC: 24 MG/DL (ref 6–20)
BUN/CREAT SERPL: 37 (ref 12–20)
CALCIUM SERPL-MCNC: 8.6 MG/DL (ref 8.5–10.1)
CHLORIDE SERPL-SCNC: 103 MMOL/L (ref 97–108)
CO2 SERPL-SCNC: 26 MMOL/L (ref 21–32)
CREAT SERPL-MCNC: 0.65 MG/DL (ref 0.55–1.02)
EOSINOPHIL # BLD: 0.1 K/UL (ref 0–0.4)
EOSINOPHIL NFR BLD: 1 % (ref 0–7)
ERYTHROCYTE [DISTWIDTH] IN BLOOD BY AUTOMATED COUNT: 17.3 % (ref 11.5–14.5)
GLOBULIN SER CALC-MCNC: 4.6 G/DL (ref 2–4)
GLUCOSE BLD STRIP.AUTO-MCNC: 170 MG/DL (ref 65–100)
GLUCOSE SERPL-MCNC: 148 MG/DL (ref 65–100)
HCT VFR BLD AUTO: 32.3 % (ref 35–47)
HGB BLD-MCNC: 10.3 G/DL (ref 11.5–16)
LYMPHOCYTES # BLD: 2.1 K/UL (ref 0.8–3.5)
LYMPHOCYTES NFR BLD: 13 % (ref 12–49)
MAGNESIUM SERPL-MCNC: 1.9 MG/DL (ref 1.6–2.4)
MCH RBC QN AUTO: 27.5 PG (ref 26–34)
MCHC RBC AUTO-ENTMCNC: 31.9 G/DL (ref 30–36.5)
MCV RBC AUTO: 86.4 FL (ref 80–99)
MONOCYTES # BLD: 1 K/UL (ref 0–1)
MONOCYTES NFR BLD: 6 % (ref 5–13)
NEUTS SEG # BLD: 12.8 K/UL (ref 1.8–8)
NEUTS SEG NFR BLD: 80 % (ref 32–75)
PHOSPHATE SERPL-MCNC: 2.9 MG/DL (ref 2.6–4.7)
PLATELET # BLD AUTO: 207 K/UL (ref 150–400)
POTASSIUM SERPL-SCNC: 4.2 MMOL/L (ref 3.5–5.1)
PROT SERPL-MCNC: 6.9 G/DL (ref 6.4–8.2)
RBC # BLD AUTO: 3.74 M/UL (ref 3.8–5.2)
SERVICE CMNT-IMP: ABNORMAL
SODIUM SERPL-SCNC: 137 MMOL/L (ref 136–145)
WBC # BLD AUTO: 16 K/UL (ref 3.6–11)

## 2017-10-27 PROCEDURE — 74011000250 HC RX REV CODE- 250: Performed by: NURSE PRACTITIONER

## 2017-10-27 PROCEDURE — 74011250636 HC RX REV CODE- 250/636: Performed by: STUDENT IN AN ORGANIZED HEALTH CARE EDUCATION/TRAINING PROGRAM

## 2017-10-27 PROCEDURE — 77030016057 HC NDL HUBR APOL -B

## 2017-10-27 PROCEDURE — 85025 COMPLETE CBC W/AUTO DIFF WBC: CPT | Performed by: INTERNAL MEDICINE

## 2017-10-27 PROCEDURE — 99282 EMERGENCY DEPT VISIT SF MDM: CPT

## 2017-10-27 PROCEDURE — 74011250637 HC RX REV CODE- 250/637: Performed by: PHYSICAL MEDICINE & REHABILITATION

## 2017-10-27 PROCEDURE — 36415 COLL VENOUS BLD VENIPUNCTURE: CPT | Performed by: INTERNAL MEDICINE

## 2017-10-27 PROCEDURE — 74011250636 HC RX REV CODE- 250/636: Performed by: HOSPITALIST

## 2017-10-27 PROCEDURE — 74011636637 HC RX REV CODE- 636/637: Performed by: HOSPITALIST

## 2017-10-27 PROCEDURE — 74011250637 HC RX REV CODE- 250/637: Performed by: HOSPITALIST

## 2017-10-27 PROCEDURE — 83735 ASSAY OF MAGNESIUM: CPT | Performed by: INTERNAL MEDICINE

## 2017-10-27 PROCEDURE — 400013 HH SOC

## 2017-10-27 PROCEDURE — 3331090002 HH PPS REVENUE DEBIT

## 2017-10-27 PROCEDURE — 74011250636 HC RX REV CODE- 250/636: Performed by: PHYSICAL MEDICINE & REHABILITATION

## 2017-10-27 PROCEDURE — 74011250637 HC RX REV CODE- 250/637: Performed by: INTERNAL MEDICINE

## 2017-10-27 PROCEDURE — 96374 THER/PROPH/DIAG INJ IV PUSH: CPT

## 2017-10-27 PROCEDURE — G0299 HHS/HOSPICE OF RN EA 15 MIN: HCPCS

## 2017-10-27 PROCEDURE — 80053 COMPREHEN METABOLIC PANEL: CPT | Performed by: INTERNAL MEDICINE

## 2017-10-27 PROCEDURE — 96375 TX/PRO/DX INJ NEW DRUG ADDON: CPT

## 2017-10-27 PROCEDURE — 84100 ASSAY OF PHOSPHORUS: CPT | Performed by: INTERNAL MEDICINE

## 2017-10-27 PROCEDURE — 74011636637 HC RX REV CODE- 636/637: Performed by: INTERNAL MEDICINE

## 2017-10-27 PROCEDURE — 74011000250 HC RX REV CODE- 250: Performed by: STUDENT IN AN ORGANIZED HEALTH CARE EDUCATION/TRAINING PROGRAM

## 2017-10-27 PROCEDURE — 3331090001 HH PPS REVENUE CREDIT

## 2017-10-27 PROCEDURE — 82962 GLUCOSE BLOOD TEST: CPT

## 2017-10-27 RX ORDER — ONDANSETRON 4 MG/1
4 TABLET, ORALLY DISINTEGRATING ORAL
Qty: 20 TAB | Refills: 0 | Status: SHIPPED | OUTPATIENT
Start: 2017-10-27 | End: 2020-11-02

## 2017-10-27 RX ORDER — CLONAZEPAM 0.5 MG/1
0.5 TABLET ORAL 2 TIMES DAILY
Qty: 20 TAB | Refills: 0 | Status: ON HOLD | OUTPATIENT
Start: 2017-10-27 | End: 2018-04-27

## 2017-10-27 RX ORDER — CLOPIDOGREL BISULFATE 75 MG/1
75 TABLET ORAL DAILY
Qty: 30 TAB | Refills: 0 | Status: SHIPPED | OUTPATIENT
Start: 2017-10-27 | End: 2018-05-23

## 2017-10-27 RX ORDER — PREDNISONE 20 MG/1
20 TABLET ORAL 2 TIMES DAILY
Qty: 60 TAB | Refills: 0 | Status: SHIPPED | OUTPATIENT
Start: 2017-10-27 | End: 2018-05-23

## 2017-10-27 RX ORDER — MORPHINE SULFATE 30 MG/1
30 TABLET ORAL
Qty: 5 TAB | Refills: 0 | Status: SHIPPED
Start: 2017-10-27 | End: 2018-05-23

## 2017-10-27 RX ORDER — INSULIN LISPRO 100 [IU]/ML
INJECTION, SOLUTION INTRAVENOUS; SUBCUTANEOUS
Qty: 1 VIAL | Refills: 0 | Status: SHIPPED | OUTPATIENT
Start: 2017-10-27 | End: 2021-08-20 | Stop reason: ALTCHOICE

## 2017-10-27 RX ORDER — MORPHINE SULFATE 100 MG/1
100 TABLET, FILM COATED, EXTENDED RELEASE ORAL EVERY 8 HOURS
Qty: 5 TAB | Refills: 0 | Status: ON HOLD
Start: 2017-10-27 | End: 2018-04-27

## 2017-10-27 RX ORDER — PANTOPRAZOLE SODIUM 40 MG/1
40 TABLET, DELAYED RELEASE ORAL
Qty: 30 TAB | Refills: 0 | Status: SHIPPED | OUTPATIENT
Start: 2017-10-27 | End: 2019-11-25 | Stop reason: CLARIF

## 2017-10-27 RX ORDER — HEPARIN 100 UNIT/ML
300 SYRINGE INTRAVENOUS
Status: COMPLETED | OUTPATIENT
Start: 2017-10-27 | End: 2017-10-27

## 2017-10-27 RX ADMIN — Medication 10 ML: at 05:29

## 2017-10-27 RX ADMIN — HYDROMORPHONE HYDROCHLORIDE 0.5 MG: 1 INJECTION, SOLUTION INTRAMUSCULAR; INTRAVENOUS; SUBCUTANEOUS at 05:19

## 2017-10-27 RX ADMIN — MORPHINE SULFATE 30 MG: 15 TABLET ORAL at 12:13

## 2017-10-27 RX ADMIN — HYDROMORPHONE HYDROCHLORIDE 0.5 MG: 1 INJECTION, SOLUTION INTRAMUSCULAR; INTRAVENOUS; SUBCUTANEOUS at 13:33

## 2017-10-27 RX ADMIN — ENOXAPARIN SODIUM 40 MG: 40 INJECTION SUBCUTANEOUS at 12:13

## 2017-10-27 RX ADMIN — PREDNISONE 20 MG: 10 TABLET ORAL at 06:40

## 2017-10-27 RX ADMIN — MORPHINE SULFATE 30 MG: 15 TABLET ORAL at 06:41

## 2017-10-27 RX ADMIN — Medication 10 ML: at 12:14

## 2017-10-27 RX ADMIN — CLONAZEPAM 0.5 MG: 0.5 TABLET ORAL at 08:47

## 2017-10-27 RX ADMIN — MORPHINE SULFATE 30 MG: 15 TABLET ORAL at 03:13

## 2017-10-27 RX ADMIN — ALTEPLASE 2 MG: 2.2 INJECTION, POWDER, LYOPHILIZED, FOR SOLUTION INTRAVENOUS at 21:35

## 2017-10-27 RX ADMIN — FAMOTIDINE 20 MG: 10 INJECTION, SOLUTION INTRAVENOUS at 08:48

## 2017-10-27 RX ADMIN — MORPHINE SULFATE 100 MG: 100 TABLET, EXTENDED RELEASE ORAL at 06:41

## 2017-10-27 RX ADMIN — MORPHINE SULFATE 30 MG: 15 TABLET ORAL at 08:47

## 2017-10-27 RX ADMIN — Medication 10 ML: at 13:34

## 2017-10-27 RX ADMIN — Medication 300 UNITS: at 22:50

## 2017-10-27 RX ADMIN — Medication 10 ML: at 08:50

## 2017-10-27 RX ADMIN — MORPHINE SULFATE 100 MG: 100 TABLET, EXTENDED RELEASE ORAL at 13:34

## 2017-10-27 RX ADMIN — INSULIN LISPRO 2 UNITS: 100 INJECTION, SOLUTION INTRAVENOUS; SUBCUTANEOUS at 06:41

## 2017-10-27 NOTE — DISCHARGE SUMMARY
Discharge Summary       PATIENT ID: Frank Almaraz  MRN: [id number]   YOB: 1975    DATE OF ADMISSION: 10/15/2017  9:10 PM    DATE OF DISCHARGE: 10/27/17   PRIMARY CARE PROVIDER: No primary care provider on file. ATTENDING PHYSICIAN: Mayo Liang MD, M[facility]  DISCHARGING PROVIDER: Mayo Liang MD, S    To contact this individual call [phone] and ask the  to page. If unavailable ask to be transferred the Adult Hospitalist Department. CONSULTATIONS: IP CONSULT TO GENERAL SURGERY  IP CONSULT TO HOSPITALIST  IP CONSULT TO GASTROENTEROLOGY  IP CONSULT TO PALLIATIVE CARE - PROVIDER    PROCEDURES/SURGERIES: Procedure(s):  10/17 Right IJ CVC placement and removal  10/16 bilateral LE venous duplex  10/16 abdominal fistula drain placement  10/15 CT abd/pelvis wo contrast    [address] COURSE:   45 yo woman with Crohn disease, h/o DVT, incarcerated ventral hernia, chronic abdominal pain on Dilaudid PCA, morbid obesity, seizures, stroke and multiple repeat abdominal surgeries was BIBEMS to the ED from [place] [address] on 10/15/17 with abdominal pain, nausea/vomiting. She was admitted with SIRS and intractable abdominal pain. Acute on chronic abdominal pain (POA)   - unclear etiology: may be a Crohn flare vs worsening of chronic pain  - CT abdomen/pelvis 10/15 large anterior abdominal wound, and a site of previous/known enterocutaneous fistula though this is difficult to assess given lack of oral and IV contrastmaterial. Mild stranding around the anterior abdominal wound as well as in the abdominal mesentery.  Nonspecific wall thickening/inflammatory appearance of the duodenum.  - Palliative care following: weaned off PCA, weaning off IV prn opiate and scheduling morphine IR q3h  - changed Zofran to PRN per request  - continue steroids at current dose per GI: discharge with prednisone 20mg BID and f/u outpt with GI Dr Calixto Arreaga  - Gen Surg consulted: no plans for surgery this admission, possible surgery in January  - tolerating mechanical soft diet and continue TPN for now  - provided with home suction catheter      Mid upper abdominal wound with multiple enterococcus fistulae - local wound care      Systemic inflammatory response syndrome (POA)   - likely due to Crohn flare, intraabdominal infection possible  - BCx 10/15 negative  - s/p levofloxacin 10/15-10/17, vancomycin 10/15-10/18, Zosyn 10/15-10/20      Hyponatremia - resolved with IVF   Hypokalemia, hypomagnesemia, hypophosphatemia - replete prn in TPN  Hypercalcemia - mild, likely due to TPN; improved after adjusting TPN      Hyperglycemia - due to TPN; increased insulin in TPN to 15 units/L with better glycemic control      DEAN - pre-renal, resolved with IV fluids      Chronic pain syndrome   - Palliative care following and managing pain; off Dilaudid PCA and on po opiates  - discharged on the below per Palliative Medicine  ~~~~~~~~~~~~~~~~~~~~~~~  1. MScontin 100mg tab tid, Disp 45 tablets.  Morphine IR 30mg tabs every 3h prn, Disp 150 tabs  ~~~~~~~~~~~~~~~~~~~~~~~    Morbid obesity, Body mass index is 54.95 kg/(m^2).     Nutrition   - TPN per Nutrition recs; home infusion company to manage TPN formula  - discharged on the below TPN formula:  ~~~~~~~~~~~~~~~~~~~~~~~~~~~~~  Increased Volume Goal (with current macronutrient concentrations)-- meets 100% of needs: 481 gm CHO.  5%AA D20 @ 85 mL/hr x 1 hour                                              @ 172 mL/hr x 13 hours                                              @ 85 mL/hr x last hour                        + 20% lipids, 500 mL 3x/week  FOR CENTRAL LINE ADMINISTRATION ONLY.   ==========   Administer with a 0.22 micron in-line filter.   ==========     Trace elements changed from daily to 3 X weekly (MWF) due to a nationwide shortage     ==========     Titrate as follows (starting 7 PM daily):   ==========   85 mL/hr x 1 hour   172 mL/hr x 13 hours   85 mL/hr x 1 hour (last hour) ==========   OFF 9 hours   ==========   Check blood sugar 2 hours into infusion and 1 hour after completed     amino acid 10% 10 % Solp 5 % 120.3 g      dextrose (D70) Solp 20 % 481.201 g     sodium chloride 4 mEq/mL Solp 35 mEq/L 84.2 mEq     potassium phosphate 3 mmol/mL Soln 15 mmol/L 36.09 mmol     potassium acetate 2 mEq/mL Soln 15 mEq/L 36.1 mEq     magnesium sulfate 4 mEq/mL (50 %) Soln 8 mEq/L 19.26 mEq     mvi, adult no. 4 with vit K 3,300 unit- 150 mcg/10 mL Soln 10 mL 10 mL     insulin regular 100 unit/mL Soln 15 Units/L 36 Units     sterile water Solp 448.3594 mL 448.3594 mL     trace elements [id number] 2-502-063-20 -1000 mcg/mL Soln 1 mL 1 mL        DISCHARGE DIAGNOSES / PLAN:      Stable for discharge home with HH. Follow up with GI, General Surgery       PENDING TEST RESULTS:   At the time of discharge the following test results are still pending: none    FOLLOW UP APPOINTMENTS:    Follow-up Information     Follow up With Details Comments 325 Anh Melara MD In 1 week GI; hospital follow up [facility] [address]  [address] Maranda Garcia MD In 1 week GI; hospital follow up [facility] [address]      Aram Cristina MD  as scheduled [address] [place] [address]  [phone]             ADDITIONAL CARE RECOMMENDATIONS:   1. Take medications as prescribed. 2. Keep appointment(s) as recommended/scheduled. 3. Please call Dr Chris Coon to schedule appointment as soon as possible and please ask about need for blood thinner. 4. Please check fingerstick blood sugars 2 hours into infusion and 1 hour after completed. 5. Insulin correction scale protocol:  CORRECTIONAL SCALE only For Blood Sugar (mg/dl) of :              140-199=2 units             200-249=3 units   250-299=5 units   300-349=7 units   350 or greater = Call MD   Give in addition to basal medications.    Do Not Hold for NPO   BEDTIME CORRECTIONAL sliding scale when scheduled:   200-249=2 units   250-299=3 units    300-349=4 units   350 or greater = Call MD   Give in addition to basal medications. Do Not Hold for NPO   Fast Acting - Administer Immediately - or within 15 minutes of start of meal, if mealtime coverage. DIET: mechanical soft, no concentrated sweets; TPN from 7p to 10a    ACTIVITY: Activity as tolerated    WOUND CARE: as per wound care: Weekly and as needed remove eakins pouch; clean wound and periwound skin with normal saline, apply marathon to periwound skin; cut to fit eakins pouch to fit around wound; place stomahesive paste in crease at 3, 6, and 9 O'clock; apply paste around inside edge of open area on eakins pouch; apply around wound and secure with transparent dressing then seal with all care skin prep;    EQUIPMENT needed: as per home health, home infusion company, wound care      DISCHARGE MEDICATIONS:  Current Discharge Medication List      START taking these medications    Details   morphine IR (MS IR) 30 mg tablet Take 1 Tab by mouth every three (3) hours. Max Daily Amount: 240 mg.  Qty: 5 Tab, Refills: 0      predniSONE (DELTASONE) 20 mg tablet Take 1 Tab by mouth two (2) times a day. Indications: Crohn's Disease  Qty: 60 Tab, Refills: 0      ondansetron (ZOFRAN ODT) 4 mg disintegrating tablet Take 1 Tab by mouth every eight (8) hours as needed for Nausea. Qty: 20 Tab, Refills: 0         CONTINUE these medications which have CHANGED    Details   pantoprazole (PROTONIX) 40 mg tablet Take 1 Tab by mouth Daily (before breakfast). Qty: 30 Tab, Refills: 0      clonazePAM (KLONOPIN) 0.5 mg tablet Take 1 Tab by mouth two (2) times a day. Max Daily Amount: 1 mg. Indications: anxiety  Qty: 20 Tab, Refills: 0      fat emulsion 20% (LIPOSYN, INTRALIPID) 20 % infusion 500 mL by IntraVENous route every Monday, Wednesday, Friday.  Indications: TPN  Qty: 1000 mL, Refills: 0      morphine CR (MS CONTIN) 100 mg CR tablet Take 1 Tab by mouth every eight (8) hours. Max Daily Amount: 300 mg. Qty: 5 Tab, Refills: 0         CONTINUE these medications which have NOT CHANGED    Details   acetaminophen (TYLENOL) 325 mg tablet Take 650 mg by mouth every six (6) hours as needed for Pain. albuterol (PROVENTIL VENTOLIN) 2.5 mg /3 mL (0.083 %) nebulizer solution 2.5 mg by Nebulization route every four (4) hours as needed for Wheezing. cyanocobalamin (VITAMIN B12) 100 mcg tablet Take 100 mcg by mouth daily. fluticasone-vilanterol (BREO ELLIPTA) 100-25 mcg/dose inhaler Take 1 Puff by inhalation daily. aspirin 81 mg chewable tablet Take 1 Tab by mouth daily. Qty: 30 Tab, Refills: 0      clopidogrel (PLAVIX) 75 mg tab Take 1 Tab by mouth daily. Qty: 30 Tab, Refills: 0         STOP taking these medications       mirtazapine (REMERON) 15 mg tablet Comments:   Reason for Stopping:         HEPARIN SODIUM,PORCINE (HEPARIN, PORCINE,) 5,000 unit/mL syrg Comments:   Reason for Stopping:         MORPHINE SULFATE/0.9% NACL/PF (MORPHINE, PF, IN 0.9 % NACL) 1 mg/mL soln Comments:   Reason for Stopping:         amino acid 5% dextrose 20% w/ electrolytes (CLINIMIX E 5/D20 SULFITE FREE) 5 % solp tpn infusion Comments:   Reason for Stopping:         piperacillin-tazobactam (ZOSYN) 3.375 gram injection Comments:   Reason for Stopping:         HYDROmorphone (DILAUDID) 4 mg tablet Comments:   Reason for Stopping:         nystatin (MYCOSTATIN) powder Comments:   Reason for Stopping:             NOTIFY YOUR PHYSICIAN FOR ANY OF THE FOLLOWING:   Fever over 101 degrees for 24 hours. Chest pain, shortness of breath, fever, chills, nausea, vomiting, diarrhea, change in mentation, falling, weakness, bleeding. Severe pain or pain not relieved by medications. Or, any other signs or symptoms that you may have questions about.     DISPOSITION:    Home With:   OT  PT x HH  RN       Long term SNF/Inpatient Rehab Independent/assisted living    Hospice    Other:       PATIENT CONDITION AT DISCHARGE:     Functional status    Poor    x Deconditioned     Independent      Cognition    x Lucid     Forgetful     Dementia      Catheters/lines (plus indication)    Reilly    x RUE PICC     PEG    x Abdominal ostomies     Code status   x  Full code     DNR      PHYSICAL EXAMINATION AT DISCHARGE:  Visit Vitals    /59 (BP 1 Location: Left arm, BP Patient Position: At rest)    Pulse 65    Temp 98.3 °F (36.8 °C)    Resp 16    Ht 5' 4\" (1.626 m)    Wt 145.5 kg (320 lb 12.3 oz)    SpO2 100%    BMI 55.06 kg/m2     Constitutional:  awake, no acute distress, cooperative, obese   ENT:  oral mucosa moist, oropharynx benign  Neck supple, no masses   Resp:  CTA bilaterally, no wheezing/rhonchi/rales   CV:  regular rhythm, normal rate, no m/r/g appreciated    GI:  +BS, soft, non distended, non tender, obese, ostomies x2     Musculoskeletal:  moves all extremities    Neurologic:  AAOx3, NFD                                             Skin:  multiple ostomies, multiple tattoos  Eyes:  PERRL    Labs  Recent Results (from the past 24 hour(s))   HCG URINE, QL    Collection Time: 10/26/17  1:30 PM   Result Value Ref Range    HCG urine, Ql. NEGATIVE  NEG     GLUCOSE, POC    Collection Time: 10/26/17  5:33 PM   Result Value Ref Range    Glucose (POC) 106 (H) 65 - 100 mg/dL    Performed by Emery Mitchell(SHOLA)    GLUCOSE, POC    Collection Time: 10/26/17 11:40 PM   Result Value Ref Range    Glucose (POC) 204 (H) 65 - 100 mg/dL    Performed by Juan Carlos Edwards    GLUCOSE, POC    Collection Time: 10/27/17  5:27 AM   Result Value Ref Range    Glucose (POC) 170 (H) 65 - 100 mg/dL    Performed by Lilian Rodriguez      Recent Results (from the past 24 hour(s))   HCG URINE, QL    Collection Time: 10/26/17  1:30 PM   Result Value Ref Range    HCG urine, Ql. NEGATIVE  NEG     GLUCOSE, POC    Collection Time: 10/26/17  5:33 PM   Result Value Ref Range Glucose (POC) 106 (H) 65 - 100 mg/dL    Performed by Kristyn Mitchell(CON)    GLUCOSE, POC    Collection Time: 10/26/17 11:40 PM   Result Value Ref Range    Glucose (POC) 204 (H) 65 - 100 mg/dL    Performed by Annmarie Bejarano    GLUCOSE, POC    Collection Time: 10/27/17  5:27 AM   Result Value Ref Range    Glucose (POC) 170 (H) 65 - 100 mg/dL    Performed by [address] [place] [address], [facility]    Collection Time: 10/27/17 11:52 AM   Result Value Ref Range    Sodium 137 136 - 145 mmol/L    Potassium 4.2 3.5 - 5.1 mmol/L    Chloride 103 97 - 108 mmol/L    CO2 26 21 - 32 mmol/L    Anion gap 8 5 - 15 mmol/L    Glucose 148 (H) 65 - 100 mg/dL    BUN 24 (H) 6 - 20 MG/DL    Creatinine 0.65 0.55 - 1.02 MG/DL    BUN/Creatinine ratio 37 (H) 12 - 20      GFR est AA >60 >60 ml/min/1.73m2    GFR est non-AA >60 >60 ml/min/1.73m2    Calcium 8.6 8.5 - 10.1 MG/DL    Bilirubin, total 0.5 0.2 - 1.0 MG/DL    ALT (SGPT) 89 (H) 12 - 78 U/L    AST (SGOT) 35 15 - 37 U/L    Alk. phosphatase 263 (H) 45 - 117 U/L    Protein, total 6.9 6.4 - 8.2 g/dL    Albumin 2.3 (L) 3.5 - 5.0 g/dL    Globulin 4.6 (H) 2.0 - 4.0 g/dL    A-G Ratio 0.5 (L) 1.1 - 2.2     CBC WITH AUTOMATED DIFF    Collection Time: 10/27/17 11:52 AM   Result Value Ref Range    WBC 16.0 (H) 3.6 - 11.0 K/uL    RBC 3.74 (L) 3.80 - 5.20 M/uL    HGB 10.3 (L) 11.5 - 16.0 g/dL    HCT 32.3 (L) 35.0 - 47.0 %    MCV 86.4 80.0 - 99.0 FL    MCH 27.5 26.0 - 34.0 PG    MCHC 31.9 30.0 - 36.5 g/dL    RDW 17.3 (H) 11.5 - 14.5 %    PLATELET 529 962 - 828 K/uL    NEUTROPHILS 80 (H) 32 - 75 %    LYMPHOCYTES 13 12 - 49 %    MONOCYTES 6 5 - 13 %    EOSINOPHILS 1 0 - 7 %    BASOPHILS 0 0 - 1 %    ABS. NEUTROPHILS 12.8 (H) 1.8 - 8.0 K/UL    ABS. LYMPHOCYTES 2.1 0.8 - 3.5 K/UL    ABS. MONOCYTES 1.0 0.0 - 1.0 K/UL    ABS. EOSINOPHILS 0.1 0.0 - 0.4 K/UL    ABS.  BASOPHILS 0.0 0.0 - 0.1 K/UL   PHOSPHORUS    Collection Time: 10/27/17 11:52 AM   Result Value Ref Range    Phosphorus 2.9 2.6 - 4.7 MG/DL   MAGNESIUM    Collection Time: 10/27/17 11:52 AM   Result Value Ref Range    Magnesium 1.9 1.6 - 2.4 mg/dL       CHRONIC MEDICAL DIAGNOSES:  Problem List as of 10/27/2017  Date Reviewed: 10/27/2017          Codes Class Noted - Resolved    Morbid obesity ([facility] (Chronic) ICD-10-CM: E66.01  ICD-9-CM: 278.01  10/27/2017 - Present        Wound, open, anterior abdominal wall ICD-10-CM: S31.109A  ICD-9-CM: 879.2  10/16/2017 - Present        Enterocutaneous fistula ICD-10-CM: K63.2  ICD-9-CM: 569.81  6/30/2017 - Present        Small bowel ischemia ([facility] ICD-10-CM: K55.9  ICD-9-CM: 557.9  6/28/2017 - Present    Overview Signed 7/3/2017  5:08 PM by Cally Aleman MD     CT abd/pelvis 6/28/17  1. The stomach and proximal small bowel loops are distended. There is a loop of  small bowel in the midabdomen which is mildly dilated and does not demonstrate  enhancement in the wall and there is suspicion of pneumatosis and this leads to  a loop of small bowel which demonstrates thickening of the wall and findings are  suspicious for ischemia. 2. There is extensive mesenteric edema and a small amount of ascites in the  pelvis. Superior mesenteric artery thrombosis ([facility] ICD-10-CM: K55.069  ICD-9-CM: 557.0  6/28/2017 - Present    Overview Signed 7/3/2017  5:09 PM by Cally Aleman MD     CT abd/pelvis 6/28/17:  3. There is nonocclusive thrombus in the SMA.                Abdominal pain ICD-10-CM: R10.9  ICD-9-CM: 789.00  6/25/2017 - Present        Perforated bowel ([facility] ICD-10-CM: K63.1  ICD-9-CM: 569.83  6/6/2017 - Present        Right flank pain ICD-10-CM: R10.9  ICD-9-CM: 789.09  8/22/2011 - Present        Crohn's disease ([facility] ICD-10-CM: K50.90  ICD-9-CM: 555.9  8/15/2011 - Present        DVT (deep venous thrombosis) ([facility] ICD-10-CM: I82.409  ICD-9-CM: 453.40  8/15/2011 - Present    Overview Signed 8/15/2011  2:08 PM by Loida Temple LPN     20             Incarcerated ventral hernia ICD-10-CM: K46.0  ICD-9-CM: 552.20  8/15/2011 - Present    Overview Signed 8/15/2011  2:30 PM by Loida Temple LPN     History of             RESOLVED: Acute hyponatremia ICD-10-CM: E87.1  ICD-9-CM: 276.1  10/16/2017 - 10/27/2017        * (Principal)RESOLVED: SIRS (systemic inflammatory response syndrome) ([facility] ICD-10-CM: R65.10  ICD-9-CM: 995.90  10/16/2017 - 10/27/2017        RESOLVED: DEAN (acute kidney injury) ([facility] ICD-10-CM: N17.9  ICD-9-CM: 584.9  10/16/2017 - 10/27/2017              Greater than 30 minutes were spent with the patient on counseling and coordination of care.     Signed:   Autumn Fuentes MD  10/27/2017  11:49 AM

## 2017-10-27 NOTE — PROGRESS NOTES
I have reviewed discharge instructions with the patient and parent. The patient and parent verbalized understanding.  All RX were given for all controlled to patient by Dr. Unruly Petty

## 2017-10-27 NOTE — PROGRESS NOTES
Bedside and Verbal shift change report given to Randal (oncoming nurse) by Angel Zambrano (offgoing nurse). Report included the following information SBAR, Kardex and Intake/Output.

## 2017-10-27 NOTE — DISCHARGE INSTRUCTIONS
ADDITIONAL CARE RECOMMENDATIONS:   1. Take medications as prescribed. 2. Keep appointment(s) as recommended/scheduled. 3. Please call Dr Krystle Delgado to schedule appointment as soon as possible and please ask about need for blood thinner. 4. Please check fingerstick blood sugars 2 hours into infusion and 1 hour after completed. 5. Insulin correction scale protocol:  CORRECTIONAL SCALE only For Blood Sugar (mg/dl) of :              140-199=2 units             200-249=3 units   250-299=5 units   300-349=7 units   350 or greater = Call MD   Give in addition to basal medications. Do Not Hold for NPO   BEDTIME CORRECTIONAL sliding scale when scheduled:   200-249=2 units   250-299=3 units    300-349=4 units   350 or greater = Call MD   Give in addition to basal medications. Do Not Hold for NPO   Fast Acting - Administer Immediately - or within 15 minutes of start of meal, if mealtime coverage.      DIET: mechanical soft, no concentrated sweets; TPN from 7p to 10a    ACTIVITY: Activity as tolerated    WOUND CARE: as per wound care: Weekly and as needed remove eakins pouch; clean wound and periwound skin with normal saline, apply marathon to periwound skin; cut to fit eakins pouch to fit around wound; place stomahesive paste in crease at 3, 6, and 9 O'clock; apply paste around inside edge of open area on eakins pouch; apply around wound and secure with transparent dressing then seal with all care skin prep;    EQUIPMENT needed: as per home health, home infusion company, wound care

## 2017-10-27 NOTE — PROGRESS NOTES
Palliative Medicine Consult  Heath: [phone] ([phone])    Patient Name: Erasmo Lubin  YOB: 1975    Date of Initial Consult: 10/16/17  Reason for Consult: pain management  Requesting Provider: Dr. Jayson Ayala  Primary Care Physician: No primary care provider on file. SUMMARY:   Erasmo Lubin is a 46 y.o. with a past history of Crohn's disease (followed by Dr Stephan Frankel), multiple surgeries s/p total colectomy with end ileostomy, 6/7/17 exp lap w SB perf, 6/28/17 exp lap, lysis of adhesions, repair of enterotomy, SMA stent on L, 7/16/17 exp lap, lysis of adhesions, fistula exclusion with feeding tube placement, frozen abdomen/ wound vac assisted closure placement, prolonged TPN, MRSA wound infection, awaiting repair of EC fistula planned for Jan 2018 if stable for surgery (an none of her home Crohn's meds until after healed) who was admitted on 10/15/2017 from [facility] with a diagnosis of acute on chronic abdominal pain. CT abd/pelvis done. Thought to be from Crohn's flare and GI started on steroids. Has not been on her home Crohn's medication since earlier this year due to recurrent infections. Current medical issues leading to Palliative Medicine involvement include: pain management. Patient's mother, Patience Pickett is NOK.  reviewed, no abberrancies- one prescriber for opioids and one pharmacy. PALLIATIVE DIAGNOSES:   1. Acute on chronic abdominal pain  2. Crohn's disease ? Flare  3. Chronic anxiety and depression, chronic benzo use  4. Debility, generalized muscle wasting  5. Anxiety- on klonipin at home   6. Nausea- multifactorial       PLAN:   1. Pain -  Thought to be Crohn's flare, GI started on steroids- awaiting to see if primary GI feels safe to incr Prednisone further (now on 40mg daily). Tried Fentanyl patch to 100mcg here w/out any benefit. 2. Doing well physically and mentally today.  Knows how to do her wound care, discussed opioid safety with her and her mom, and she was able to space out her IV Dilaudid 0.5prn doses more overnight. 3. Encouraged her that she will cont to improve! 4. Pain medication:   1. Pt sees Dr Franky Solares ([facility]) for chronic pain management. Has an appt on 11/7.   2. While our team typically only writes Rx upon discharge for clinic pts (and pt does not meet this criteria), as she has good follow up, known pain etiology, and has no red flags- I wrote her MSContin and Morphine IR scripts -MScontin 100mg tab tid, Disp 45 tablets. Morphine IR 30mg tabs every 3h prn, Disp 150 tabs. 3. This will help her avoid an unnecessary ED visit likely. 4. Consider consulting us again when she is readmitted (planned for surgery in Jan). GOALS OF CARE / TREATMENT PREFERENCES:   [====Goals of Care====]  GOALS OF CARE:  Patient / health care proxy stated goals: (pain management)  Goals not reviewed today        TREATMENT PREFERENCES:   Code Status: Full Code    Advance Care Planning:  Advance Care Planning 10/16/2017   Patient's Healthcare Decision Maker is: Legal Next herberth Page [address]   Primary Decision Maker Name Nabila Patel   Primary Decision Maker Phone Number [phone]   Primary Decision Maker Relationship to Patient Parent   Confirm Advance Directive None   Patient Would Like to Complete Advance Directive -       Other:    The palliative care team has discussed with patient / health care proxy about goals of care / treatment preferences for patient.  [====Goals of Care====]         HISTORY:     HPI/SUBJECTIVE:    The patient is:   [x] Verbal and participatory  [] Non-participatory due to:   46year old female who is known to our service from previous admission. She reports one day of acute change in abdominal pain. She had been doing \"ok\" and was planning on seeing her pain management doctor on Wednesday of this week for recommendations about weaning. She doesn't think Artist Quique had changed anything recently with her pain meds.   She was on 60 Q8 of MS contin, morphine PCA (1mg for a while, most recently 0.5mg PCA dose) and PRN dilaudid 1mg IV . At one point they tried getting her off the PCA, but she didn't tolerate that well because it was too many pills for her to digest, so PCA had been restarted. 10/27- doing well ,was able to space out IV Dilaudid 0.5mg dose to 6 hours overnight. No nausea, tolerating diet. Clinical Pain Assessment (nonverbal scale for severity on nonverbal patients):   [++++ Clinical Pain Assessment++++]  [++++Pain Severity++++]: Pain: 7  [++++Pain Character++++]: stabbing  [++++Pain Duration++++]: Bad for the last 24-48 hours prior to admission   [++++Pain Effect++++]: nauseated  [++++Pain Factors++++]: worse w/ movement and wound care change   [++++Pain Frequency++++]: constant  [++++Pain Location++++]: all over abdomen  [++++ Clinical Pain Assessment++++]  Duration: for how long has pt been experiencing pain (e.g., 2 days, 1 month, years)  Frequency: how often pain is an issue (e.g., several times per day, once every few days, constant)     FUNCTIONAL ASSESSMENT:     Palliative Performance Scale (PPS):  PPS: 50       PSYCHOSOCIAL/SPIRITUAL SCREENING:     Advance Care Planning:  Advance Care Planning 10/16/2017   Patient's Healthcare Decision Maker is: Legal Next of Camilo 71   Primary Decision Maker Name Jayme Lund   Primary [address] [facility] Phone Number [phone]   Primary Decision Maker Relationship to Patient Parent   Confirm Advance Directive None   Patient Would Like to Complete Advance Directive -        Any spiritual / Confucianism concerns:  [] Yes /  [x] No    Caregiver Burnout:  [] Yes /  [x] No /  [] No Caregiver Present      Anticipatory grief assessment:   [x] Normal  / [] Maladaptive       ESAS Anxiety: Anxiety: 5    ESAS Depression: Depression: 5        REVIEW OF SYSTEMS:     Positive and pertinent negative findings in ROS are noted above in HPI.   The following systems were [x] reviewed / [] unable to be reviewed as noted in HPI  Other findings are noted below. Systems: constitutional, ears/nose/mouth/throat, respiratory, gastrointestinal, genitourinary, musculoskeletal, integumentary, neurologic, psychiatric, endocrine. Positive findings noted below. Modified ESAS Completed by: provider   Fatigue: 6 Drowsiness: 0   Depression: 5 Pain: 7   Anxiety: 5 Nausea: 3   Anorexia: 3 Dyspnea: 0     Constipation: No              PHYSICAL EXAM:     From RN flowsheet:  Wt Readings from Last 3 Encounters:   10/27/17 320 lb 12.3 oz (145.5 kg)   10/03/17 338 lb (153.3 kg)   09/12/17 338 lb (153.3 kg)     Blood pressure 109/59, pulse 65, temperature 98.3 °F (36.8 °C), resp. rate 16, height 5' 4\" (1.626 m), weight 320 lb 12.3 oz (145.5 kg), SpO2 100 %. Pain Scale 1: Numeric (0 - 10)  Pain Intensity 1: 7  Pain Onset 1: chronic  Pain Location 1: Abdomen  Pain Orientation 1: Mid  Pain Description 1: Stabbing  Pain Intervention(s) 1: Medication (see MAR)  Last bowel movement, if known: today     Constitutional: pt is awake, alert, chronically ill appearing,smiling today  Eyes: Clear  ENT: moist mucous membranes, nares patent  Chest: Regular rhythm   Resp: CTA, unlabored  Abd: EC fistula, no leakage, +BS. TTP.    Msk: No abnormalities  Neuro: moving all extremities   Psych: a bit anxious, appreciative          HISTORY:     Principal Problem:    SIRS (systemic inflammatory response syndrome) (HCC) (10/16/2017)    Active Problems:    Abdominal pain (6/25/2017)      Acute hyponatremia (10/16/2017)      Leukocytosis (10/16/2017)      Wound, open, anterior abdominal wall (10/16/2017)      DEAN (acute kidney injury) ([address] (10/16/2017)      Past Medical History:   Diagnosis Date    Crohn's disease ([address] 8/15/2011    DVT (deep venous thrombosis) ([address] 8/15/2011    Incarcerated ventral hernia 8/15/2011    Right flank pain 8/22/2011    Seizures ([address]     Stroke [facility])       Past Surgical History:   Procedure Laterality Date    HX OTHER SURGICAL multiple procedures related to her Crohn's disease    [place] LAP, INCISIONAL HERNIA REPAIR,INCARCERATED  9-10-08    dr. Mary Sanchez History   Problem Relation Age of Onset    Hypertension Father     Stroke Father     Other Father      arthritis      History reviewed, no pertinent family history.   Social History   Substance Use Topics    Smoking status: Former Smoker    Smokeless tobacco: Not on file    Alcohol use No     Allergies   Allergen Reactions    Demerol [Meperidine] Unknown (comments)    Soma [Carisoprodol] Rash and Nausea Only    Sulfa (Sulfonamide Antibiotics) Unknown (comments)    Toradol [Ketorolac Tromethamine] Unknown (comments)      Current Facility-Administered Medications   Medication Dose Route Frequency    TPN ADULT - CENTRAL   IntraVENous CONTINUOUS    morphine IR (MS IR) tablet 30 mg  30 mg Oral Q3H    predniSONE (DELTASONE) tablet 20 mg  20 mg Oral BID    morphine CR (MS CONTIN) tablet 100 mg  100 mg Oral Q8H    HYDROmorphone (PF) (DILAUDID) injection 0.5 mg  0.5 mg IntraVENous Q4H PRN    sodium chloride 0.9 % bolus infusion 250 mL  250 mL IntraVENous PRN    ondansetron (ZOFRAN) injection 4 mg  4 mg IntraVENous Q6H PRN    sodium chloride (NS) flush 20 mL  20 mL InterCATHeter PRN    sodium chloride (NS) flush 10 mL  10 mL InterCATHeter Q24H    sodium chloride (NS) flush 10 mL  10 mL InterCATHeter PRN    sodium chloride (NS) flush 10 mL  10 mL InterCATHeter Q8H    alteplase (CATHFLO) 1 mg in sterile water (preservative free) 1 mL injection  1 mg InterCATHeter PRN    bacitracin 500 unit/gram packet 1 Packet  1 Packet Topical PRN    insulin lispro (HUMALOG) injection   SubCUTAneous Q6H    enoxaparin (LOVENOX) injection 40 mg  40 mg SubCUTAneous Q24H    acetaminophen (TYLENOL) tablet 650 mg  650 mg Oral Q6H PRN    glucose chewable tablet 16 g  4 Tab Oral PRN    dextrose (D50W) injection syrg 12.5-25 g  12.5-25 g IntraVENous PRN    glucagon (GLUCAGEN) injection 1 mg 1 mg IntraMUSCular PRN    fat emulsion 20% (LIPOSYN, INTRAlipid) infusion 500 mL  500 mL IntraVENous Q MON, WED & FRI    clonazePAM (KlonoPIN) tablet 0.5 mg  0.5 mg Oral BID    sodium chloride (NS) flush 5-10 mL  5-10 mL IntraVENous Q8H    sodium chloride (NS) flush 5-10 mL  5-10 mL IntraVENous PRN    sodium chloride (NS) flush 5-10 mL  5-10 mL IntraVENous PRN    famotidine (PF) (PEPCID) 20 mg in sodium chloride 0.9 % 10 mL injection  20 mg IntraVENous Q12H    sodium chloride (NS) flush 5-10 mL  5-10 mL IntraVENous PRN          LAB AND IMAGING FINDINGS:     Lab Results   Component Value Date/Time    WBC 14.8 10/26/2017 02:50 AM    HGB 9.0 10/26/2017 02:50 AM    PLATELET 795 82/32/0163 02:50 AM     Lab Results   Component Value Date/Time    Sodium 137 10/26/2017 02:50 AM    Potassium 4.0 10/26/2017 02:50 AM    Chloride 104 10/26/2017 02:50 AM    CO2 27 10/26/2017 02:50 AM    BUN 22 10/26/2017 02:50 AM    Creatinine 0.63 10/26/2017 02:50 AM    Calcium 8.4 10/26/2017 02:50 AM    Magnesium 1.5 10/26/2017 02:50 AM    Phosphorus 2.9 10/26/2017 02:50 AM      Lab Results   Component Value Date/Time    AST (SGOT) 36 10/26/2017 02:50 AM    Alk. phosphatase 262 10/26/2017 02:50 AM    Protein, total 6.0 10/26/2017 02:50 AM    Albumin 2.0 10/26/2017 02:50 AM    Globulin 4.0 10/26/2017 02:50 AM     No results found for: INR, PTMR, PTP, PT1, PT2, APTT   No results found for: IRON, FE, TIBC, IBCT, PSAT, FERR   No results found for: PH, PCO2, PO2  No components found for: GLPOC   No results found for: CPK, CKMB             Total time:   Counseling / coordination time, spent as noted above:   > 50% counseling / coordination?:     Prolonged service was provided for  []30 min   []75 min in face to face time in the presence of the patient, spent as noted above. Time Start:   Time End:   Note: this can only be billed with 45026 (initial) or 73130 (follow up). If multiple start / stop times, list each separately.

## 2017-10-28 ENCOUNTER — HOME CARE VISIT (OUTPATIENT)
Dept: SCHEDULING | Facility: HOME HEALTH | Age: 42
End: 2017-10-28
Payer: MEDICARE

## 2017-10-28 VITALS
OXYGEN SATURATION: 99 % | HEIGHT: 64 IN | BODY MASS INDEX: 50.02 KG/M2 | SYSTOLIC BLOOD PRESSURE: 126 MMHG | RESPIRATION RATE: 24 BRPM | WEIGHT: 293 LBS | TEMPERATURE: 97.8 F | HEART RATE: 76 BPM | DIASTOLIC BLOOD PRESSURE: 76 MMHG

## 2017-10-28 VITALS
OXYGEN SATURATION: 98 % | DIASTOLIC BLOOD PRESSURE: 68 MMHG | TEMPERATURE: 48.7 F | HEART RATE: 96 BPM | SYSTOLIC BLOOD PRESSURE: 117 MMHG | RESPIRATION RATE: 18 BRPM

## 2017-10-28 PROCEDURE — G0299 HHS/HOSPICE OF RN EA 15 MIN: HCPCS

## 2017-10-28 PROCEDURE — 3331090002 HH PPS REVENUE DEBIT

## 2017-10-28 PROCEDURE — 3331090001 HH PPS REVENUE CREDIT

## 2017-10-28 NOTE — ED NOTES
Successfully flushed both PICC ports with alteplase. Will wait 1 hour per protocol and try to flush again with saline.

## 2017-10-28 NOTE — ED TRIAGE NOTES
Triage: Pt arrives ambulatory to ED from home. Pt was discharged this afternoon at 1300, states PICC line needs to be unclogged and that she did not receive her Plavix prescription.

## 2017-10-28 NOTE — ED PROVIDER NOTES
HPI Comments: 46 y.o. female with past medical history significant for Crohn's disease, DVT, incarcerated ventral hernia, stroke, and seizures who presents to the ED with chief complaint of vascular access problem. Patient reports having a PICC line placed in her right arm for home TPN during her most recent hospital admission; reports being discharged home \"earlier today. \" Patient reports her home health nurse found both the white and purple lines blocked while attempting to push TPN \"this afternoon. \" Patient reports a history of multiple surgeries since April 2017 related to \"first a PillCam that was stuck in (her) intestines, then a perforated bowel during one of (her) surgeries, and then recently a blood clot. \" Patient reports Jesu Davenport said they were going to give (her) Plavix but forgot to give (her) the prescription for it\" upon her discharge \"earlier today. \" Patient reports a \"blockage\" in her left arm; reports \"that's why they can't do a PICC line in (her) left arm. \" Patient reports a history of multiple central line placements while at [address] [place] [address] in the past. There are no other acute medical concerns at this time. PCP: Dutch Loaiza MD    Note written by Rekha Mitchell, as dictated by Milena Alberto MD 9:34 PM     The history is provided by the patient.         Past Medical History:   Diagnosis Date    Cro[facility]'s disease ([facility] 8/15/2011    DVT (deep venous thrombosis) ([facility] 8/15/2011    Incarcerated ventral hernia 8/15/2011    Right flank pain 8/22/2011    Seizures ([facility]     Stroke [facility])        Past Surgical History:   Procedure Laterality Date    HX OTHER SURGICAL      multiple procedures related to her Crohn's disease    [place] LAP, INCISIONAL HERNIA REPAIR,INCARCERATED  9-10-08    dr. Savana Castillo         Family History:   Problem Relation Age of Onset    Hypertension Father     Stroke Father     Other Father      arthritis       Social History     Social History    Marital status: SINGLE Spouse name: N/A    Number of children: N/A    Years of education: N/A     Occupational History    Not on file. Social History Main Topics    Smoking status: Former Smoker    Smokeless tobacco: Not on file    Alcohol use No    Drug use: Not on file    Sexual activity: Not on file     Other Topics Concern    Not on file     Social History Narrative         ALLERGIES: Demerol [meperidine]; Soma [carisoprodol]; Sulfa (sulfonamide antibiotics); and Toradol [ketorolac tromethamine]    Review of Systems   Constitutional: Negative for chills and fever. HENT: Negative for sore throat. Respiratory: Negative for cough and shortness of breath. Cardiovascular: Negative for chest pain. Gastrointestinal: Negative for abdominal pain and vomiting. Genitourinary: Negative for dysuria. Musculoskeletal: Negative for back pain. Skin: Negative for rash. Neurological: Negative for syncope and headaches. Psychiatric/Behavioral: Negative for confusion. All other systems reviewed and are negative. Vitals:    10/27/17 2059   BP: (!) 147/100   Pulse: 94   Resp: 16   Temp: 97.7 °F (36.5 °C)   SpO2: 100%            Physical Exam   Constitutional: She is oriented to person, place, and time. She appears well-developed. No distress. Morbidly obese; chronically ill-appearing. HENT:   Head: Normocephalic and atraumatic. Eyes: Conjunctivae and EOM are normal. Pupils are equal, round, and reactive to light. Neck: Normal range of motion. Neck supple. Cardiovascular: Normal rate, regular rhythm and normal heart sounds. No murmur heard. PICC line in the right upper extremity that is secured with attachment device and Tegaderm; no obvious dislodgement. Pulmonary/Chest: Effort normal and breath sounds normal. No respiratory distress. Abdominal: Soft. Bowel sounds are normal. She exhibits no distension. There is no tenderness. There is no rebound. Musculoskeletal: Normal range of motion.  She exhibits no edema. Neurological: She is alert and oriented to person, place, and time. No cranial nerve deficit. She exhibits normal muscle tone. Coordination normal.   Skin: Skin is warm and dry. No rash noted. Psychiatric: She has a normal mood and affect. Her behavior is normal.   Nursing note and vitals reviewed. Note written by Rekha Maria, as dictated by Chuy Hinkle MD 9:46 PM      [facility]  ED Course       Procedures    PICC line declogged with tPA. Easily flushed, per RN. Pt ready for dc home.

## 2017-10-28 NOTE — ED NOTES
I have reviewed discharge instructions with the patient. The patient verbalized understanding. Time allotted for questions. VSS. Pt wheeled out of unit with mother.

## 2017-10-29 ENCOUNTER — HOME CARE VISIT (OUTPATIENT)
Dept: SCHEDULING | Facility: HOME HEALTH | Age: 42
End: 2017-10-29
Payer: MEDICARE

## 2017-10-29 VITALS
HEART RATE: 117 BPM | TEMPERATURE: 99 F | DIASTOLIC BLOOD PRESSURE: 68 MMHG | RESPIRATION RATE: 18 BRPM | OXYGEN SATURATION: 96 % | SYSTOLIC BLOOD PRESSURE: 110 MMHG

## 2017-10-29 PROCEDURE — 3331090001 HH PPS REVENUE CREDIT

## 2017-10-29 PROCEDURE — 3331090002 HH PPS REVENUE DEBIT

## 2017-10-29 PROCEDURE — G0299 HHS/HOSPICE OF RN EA 15 MIN: HCPCS

## 2017-10-30 ENCOUNTER — HOME CARE VISIT (OUTPATIENT)
Dept: SCHEDULING | Facility: HOME HEALTH | Age: 42
End: 2017-10-30
Payer: MEDICARE

## 2017-10-30 VITALS
SYSTOLIC BLOOD PRESSURE: 106 MMHG | HEART RATE: 92 BPM | OXYGEN SATURATION: 98 % | DIASTOLIC BLOOD PRESSURE: 62 MMHG | RESPIRATION RATE: 20 BRPM | TEMPERATURE: 97 F

## 2017-10-30 PROCEDURE — A4333 URINARY CATH ANCHOR DEVICE: HCPCS

## 2017-10-30 PROCEDURE — G0299 HHS/HOSPICE OF RN EA 15 MIN: HCPCS

## 2017-10-30 PROCEDURE — 3331090001 HH PPS REVENUE CREDIT

## 2017-10-30 PROCEDURE — A6216 NON-STERILE GAUZE<=16 SQ IN: HCPCS

## 2017-10-30 PROCEDURE — G0300 HHS/HOSPICE OF LPN EA 15 MIN: HCPCS

## 2017-10-30 PROCEDURE — A5120 SKIN BARRIER, WIPE OR SWAB: HCPCS

## 2017-10-30 PROCEDURE — A4357 BEDSIDE DRAINAGE BAG: HCPCS

## 2017-10-30 PROCEDURE — A7000 DISPOSABLE CANISTER FOR PUMP: HCPCS

## 2017-10-30 PROCEDURE — A4456 ADHESIVE REMOVER, WIPES: HCPCS

## 2017-10-30 PROCEDURE — A4649 SURGICAL SUPPLIES: HCPCS

## 2017-10-30 PROCEDURE — 3331090002 HH PPS REVENUE DEBIT

## 2017-10-30 PROCEDURE — A4406 PECTIN BASED OSTOMY PASTE: HCPCS

## 2017-10-31 VITALS
SYSTOLIC BLOOD PRESSURE: 104 MMHG | TEMPERATURE: 98.1 F | DIASTOLIC BLOOD PRESSURE: 74 MMHG | HEART RATE: 92 BPM | OXYGEN SATURATION: 99 % | RESPIRATION RATE: 16 BRPM

## 2017-10-31 PROCEDURE — 3331090001 HH PPS REVENUE CREDIT

## 2017-10-31 PROCEDURE — 3331090002 HH PPS REVENUE DEBIT

## 2017-11-01 ENCOUNTER — HOME CARE VISIT (OUTPATIENT)
Dept: SCHEDULING | Facility: HOME HEALTH | Age: 42
End: 2017-11-01
Payer: MEDICARE

## 2017-11-01 VITALS
OXYGEN SATURATION: 98 % | TEMPERATURE: 97.1 F | SYSTOLIC BLOOD PRESSURE: 100 MMHG | RESPIRATION RATE: 18 BRPM | HEART RATE: 84 BPM | DIASTOLIC BLOOD PRESSURE: 64 MMHG

## 2017-11-01 PROCEDURE — G0300 HHS/HOSPICE OF LPN EA 15 MIN: HCPCS

## 2017-11-01 PROCEDURE — 3331090001 HH PPS REVENUE CREDIT

## 2017-11-01 PROCEDURE — 3331090002 HH PPS REVENUE DEBIT

## 2017-11-02 ENCOUNTER — HOME CARE VISIT (OUTPATIENT)
Dept: SCHEDULING | Facility: HOME HEALTH | Age: 42
End: 2017-11-02
Payer: MEDICARE

## 2017-11-02 PROCEDURE — 3331090002 HH PPS REVENUE DEBIT

## 2017-11-02 PROCEDURE — 3331090001 HH PPS REVENUE CREDIT

## 2017-11-02 PROCEDURE — G0299 HHS/HOSPICE OF RN EA 15 MIN: HCPCS

## 2017-11-03 PROCEDURE — 3331090002 HH PPS REVENUE DEBIT

## 2017-11-03 PROCEDURE — 3331090001 HH PPS REVENUE CREDIT

## 2017-11-04 VITALS
TEMPERATURE: 97.7 F | RESPIRATION RATE: 16 BRPM | SYSTOLIC BLOOD PRESSURE: 102 MMHG | OXYGEN SATURATION: 98 % | HEART RATE: 80 BPM | DIASTOLIC BLOOD PRESSURE: 70 MMHG

## 2017-11-04 PROCEDURE — 3331090002 HH PPS REVENUE DEBIT

## 2017-11-04 PROCEDURE — 3331090001 HH PPS REVENUE CREDIT

## 2017-11-05 PROCEDURE — 3331090001 HH PPS REVENUE CREDIT

## 2017-11-05 PROCEDURE — 3331090002 HH PPS REVENUE DEBIT

## 2017-11-06 ENCOUNTER — HOME CARE VISIT (OUTPATIENT)
Dept: SCHEDULING | Facility: HOME HEALTH | Age: 42
End: 2017-11-06
Payer: MEDICARE

## 2017-11-06 VITALS
RESPIRATION RATE: 16 BRPM | OXYGEN SATURATION: 96 % | TEMPERATURE: 97.6 F | HEART RATE: 82 BPM | DIASTOLIC BLOOD PRESSURE: 70 MMHG | SYSTOLIC BLOOD PRESSURE: 114 MMHG

## 2017-11-06 PROCEDURE — A4362 SOLID SKIN BARRIER: HCPCS

## 2017-11-06 PROCEDURE — 3331090002 HH PPS REVENUE DEBIT

## 2017-11-06 PROCEDURE — A7000 DISPOSABLE CANISTER FOR PUMP: HCPCS

## 2017-11-06 PROCEDURE — A4331 EXTENSION DRAINAGE TUBING: HCPCS

## 2017-11-06 PROCEDURE — G0299 HHS/HOSPICE OF RN EA 15 MIN: HCPCS

## 2017-11-06 PROCEDURE — A4628 OROPHARYNGEAL SUCTION CATH: HCPCS

## 2017-11-06 PROCEDURE — 3331090001 HH PPS REVENUE CREDIT

## 2017-11-07 PROCEDURE — 3331090001 HH PPS REVENUE CREDIT

## 2017-11-07 PROCEDURE — 3331090002 HH PPS REVENUE DEBIT

## 2017-11-08 ENCOUNTER — HOME CARE VISIT (OUTPATIENT)
Dept: SCHEDULING | Facility: HOME HEALTH | Age: 42
End: 2017-11-08
Payer: MEDICARE

## 2017-11-08 VITALS
HEART RATE: 103 BPM | DIASTOLIC BLOOD PRESSURE: 68 MMHG | OXYGEN SATURATION: 98 % | TEMPERATURE: 97.5 F | RESPIRATION RATE: 16 BRPM | SYSTOLIC BLOOD PRESSURE: 124 MMHG

## 2017-11-08 PROCEDURE — G0299 HHS/HOSPICE OF RN EA 15 MIN: HCPCS

## 2017-11-08 PROCEDURE — A4357 BEDSIDE DRAINAGE BAG: HCPCS

## 2017-11-08 PROCEDURE — 3331090002 HH PPS REVENUE DEBIT

## 2017-11-08 PROCEDURE — A7002 TUBING USED W SUCTION PUMP: HCPCS

## 2017-11-08 PROCEDURE — A4649 SURGICAL SUPPLIES: HCPCS

## 2017-11-08 PROCEDURE — 3331090001 HH PPS REVENUE CREDIT

## 2017-11-09 ENCOUNTER — HOME CARE VISIT (OUTPATIENT)
Dept: SCHEDULING | Facility: HOME HEALTH | Age: 42
End: 2017-11-09
Payer: MEDICARE

## 2017-11-09 VITALS
OXYGEN SATURATION: 98 % | SYSTOLIC BLOOD PRESSURE: 114 MMHG | DIASTOLIC BLOOD PRESSURE: 72 MMHG | RESPIRATION RATE: 16 BRPM | HEART RATE: 91 BPM | TEMPERATURE: 97.2 F

## 2017-11-09 PROCEDURE — G0299 HHS/HOSPICE OF RN EA 15 MIN: HCPCS

## 2017-11-09 PROCEDURE — 3331090001 HH PPS REVENUE CREDIT

## 2017-11-09 PROCEDURE — 3331090002 HH PPS REVENUE DEBIT

## 2017-11-10 ENCOUNTER — HOME CARE VISIT (OUTPATIENT)
Dept: HOME HEALTH SERVICES | Facility: HOME HEALTH | Age: 42
End: 2017-11-10
Payer: MEDICARE

## 2017-11-10 PROCEDURE — 3331090001 HH PPS REVENUE CREDIT

## 2017-11-10 PROCEDURE — 3331090002 HH PPS REVENUE DEBIT

## 2017-11-11 ENCOUNTER — HOME CARE VISIT (OUTPATIENT)
Dept: SCHEDULING | Facility: HOME HEALTH | Age: 42
End: 2017-11-11
Payer: MEDICARE

## 2017-11-11 PROCEDURE — 3331090001 HH PPS REVENUE CREDIT

## 2017-11-11 PROCEDURE — G0300 HHS/HOSPICE OF LPN EA 15 MIN: HCPCS

## 2017-11-11 PROCEDURE — 3331090002 HH PPS REVENUE DEBIT

## 2017-11-12 ENCOUNTER — HOME CARE VISIT (OUTPATIENT)
Dept: SCHEDULING | Facility: HOME HEALTH | Age: 42
End: 2017-11-12
Payer: MEDICARE

## 2017-11-12 PROCEDURE — 3331090002 HH PPS REVENUE DEBIT

## 2017-11-12 PROCEDURE — 3331090001 HH PPS REVENUE CREDIT

## 2017-11-12 PROCEDURE — G0300 HHS/HOSPICE OF LPN EA 15 MIN: HCPCS

## 2017-11-13 ENCOUNTER — HOME CARE VISIT (OUTPATIENT)
Dept: SCHEDULING | Facility: HOME HEALTH | Age: 42
End: 2017-11-13
Payer: MEDICARE

## 2017-11-13 VITALS
SYSTOLIC BLOOD PRESSURE: 108 MMHG | TEMPERATURE: 97.3 F | OXYGEN SATURATION: 90 % | DIASTOLIC BLOOD PRESSURE: 80 MMHG | HEART RATE: 109 BPM | RESPIRATION RATE: 16 BRPM

## 2017-11-13 PROCEDURE — G0299 HHS/HOSPICE OF RN EA 15 MIN: HCPCS

## 2017-11-13 PROCEDURE — 3331090002 HH PPS REVENUE DEBIT

## 2017-11-13 PROCEDURE — A4385 OST SKN BARRIER SLD EXT WEAR: HCPCS

## 2017-11-13 PROCEDURE — A6216 NON-STERILE GAUZE<=16 SQ IN: HCPCS

## 2017-11-13 PROCEDURE — A4406 PECTIN BASED OSTOMY PASTE: HCPCS

## 2017-11-13 PROCEDURE — 3331090001 HH PPS REVENUE CREDIT

## 2017-11-13 PROCEDURE — A4649 SURGICAL SUPPLIES: HCPCS

## 2017-11-14 PROCEDURE — 3331090002 HH PPS REVENUE DEBIT

## 2017-11-14 PROCEDURE — 3331090001 HH PPS REVENUE CREDIT

## 2017-11-15 ENCOUNTER — TELEPHONE (OUTPATIENT)
Dept: SURGERY | Age: 42
End: 2017-11-15

## 2017-11-15 ENCOUNTER — HOME CARE VISIT (OUTPATIENT)
Dept: SCHEDULING | Facility: HOME HEALTH | Age: 42
End: 2017-11-15
Payer: MEDICARE

## 2017-11-15 VITALS
RESPIRATION RATE: 18 BRPM | DIASTOLIC BLOOD PRESSURE: 58 MMHG | HEART RATE: 78 BPM | TEMPERATURE: 97.8 F | OXYGEN SATURATION: 95 % | SYSTOLIC BLOOD PRESSURE: 97 MMHG

## 2017-11-15 PROCEDURE — A4333 URINARY CATH ANCHOR DEVICE: HCPCS

## 2017-11-15 PROCEDURE — A4369 SKIN BARRIER LIQUID PER OZ: HCPCS

## 2017-11-15 PROCEDURE — A7000 DISPOSABLE CANISTER FOR PUMP: HCPCS

## 2017-11-15 PROCEDURE — G0299 HHS/HOSPICE OF RN EA 15 MIN: HCPCS

## 2017-11-15 PROCEDURE — 3331090002 HH PPS REVENUE DEBIT

## 2017-11-15 PROCEDURE — A4357 BEDSIDE DRAINAGE BAG: HCPCS

## 2017-11-15 PROCEDURE — 3331090001 HH PPS REVENUE CREDIT

## 2017-11-15 PROCEDURE — A4628 OROPHARYNGEAL SUCTION CATH: HCPCS

## 2017-11-15 PROCEDURE — A4406 PECTIN BASED OSTOMY PASTE: HCPCS

## 2017-11-15 NOTE — TELEPHONE ENCOUNTER
Patient's mother wants to know why her daughter needs to come in or is this something the home health nurse can assist with. She said she can not bring her today but is concern because of the cold and not wanting to bring her daughter out in the cold.

## 2017-11-15 NOTE — TELEPHONE ENCOUNTER
Spoke with patient's mother, who used pt identifiers. She already has an appointment for the patient to see Dr. Edgar Song tomorrow. Reports that pt got septic from her PICC line twice at [facility], last time was 6 weeks ago and she had a new PICC line placed. Had a fever when that occurred. Saw PCP Gunnar Aquino MD last week and said that the patient was \"fine\" at that visit. She will bring patient in tomorrow to f/u up on WBC of 26 from lab draw on 11/13/17.

## 2017-11-15 NOTE — TELEPHONE ENCOUNTER
REcieved phone call from home health nurse. WBC 26. Reports pt felt like she was coming down with a cold but otherwise ok. Spoke with pt, denies fever, no temp >100F. Pain controlled. Fistula output steady. Wound care nurse coming today for eakins pouch. No n/v. picc line dressing changed 2 days ago. Pt does not feel sick and would prefer not to come to be evaluated. Asks if I can go ahead and start abx. D/w pt that I am not sure why her wbc count is elevated or if it is even surgical in nature although it does not seem likely at this point, she needs to be evaluated. Pt may be seen by our group, PCP or another provider. Patient verbalized understanding and agreement. She will call mother and discuss transportation.

## 2017-11-16 ENCOUNTER — TELEPHONE (OUTPATIENT)
Dept: SURGERY | Age: 42
End: 2017-11-16

## 2017-11-16 PROCEDURE — 3331090002 HH PPS REVENUE DEBIT

## 2017-11-16 PROCEDURE — 3331090001 HH PPS REVENUE CREDIT

## 2017-11-16 NOTE — TELEPHONE ENCOUNTER
spoke with patient's mother regarding missed visit today with Dr Ivette Sever. Mother states ostomy bag burst this morning and she called office and cancelled appointment. Mother states home health gave patient 1 bag of IV fluids last night and patient is feeling much better. Patient was seen today by her PCP. Labs are to be drawn again tomorrow. Dr Ivette Sever is aware of labs. Labs scanned into [facility].

## 2017-11-17 ENCOUNTER — HOME CARE VISIT (OUTPATIENT)
Dept: SCHEDULING | Facility: HOME HEALTH | Age: 42
End: 2017-11-17
Payer: MEDICARE

## 2017-11-17 VITALS
OXYGEN SATURATION: 98 % | TEMPERATURE: 96.8 F | DIASTOLIC BLOOD PRESSURE: 66 MMHG | HEART RATE: 110 BPM | SYSTOLIC BLOOD PRESSURE: 100 MMHG | RESPIRATION RATE: 18 BRPM

## 2017-11-17 PROCEDURE — 3331090001 HH PPS REVENUE CREDIT

## 2017-11-17 PROCEDURE — G0300 HHS/HOSPICE OF LPN EA 15 MIN: HCPCS

## 2017-11-17 PROCEDURE — 3331090002 HH PPS REVENUE DEBIT

## 2017-11-18 PROCEDURE — 3331090001 HH PPS REVENUE CREDIT

## 2017-11-18 PROCEDURE — 3331090002 HH PPS REVENUE DEBIT

## 2017-11-19 PROCEDURE — 3331090002 HH PPS REVENUE DEBIT

## 2017-11-19 PROCEDURE — 3331090001 HH PPS REVENUE CREDIT

## 2017-11-20 ENCOUNTER — TELEPHONE (OUTPATIENT)
Dept: SURGERY | Age: 42
End: 2017-11-20

## 2017-11-20 ENCOUNTER — HOME CARE VISIT (OUTPATIENT)
Dept: SCHEDULING | Facility: HOME HEALTH | Age: 42
End: 2017-11-20
Payer: MEDICARE

## 2017-11-20 PROCEDURE — 3331090002 HH PPS REVENUE DEBIT

## 2017-11-20 PROCEDURE — 3331090001 HH PPS REVENUE CREDIT

## 2017-11-20 PROCEDURE — G0300 HHS/HOSPICE OF LPN EA 15 MIN: HCPCS

## 2017-11-20 NOTE — TELEPHONE ENCOUNTER
Spoke with Shaun Roth from [facility]. Reports that pt's most recent labs show that the patient is dehydrated and is requesting that prescribe IVF. Would need to call the home health/TPN company to do so. Pt did not show up for f/u visit last week for WBC count of 26.3K, as patient, her mother and I had discussed. Informed home health nurse that since patient had not been seen by our office since her discharge on 10/27/17 and we do not have her most recent labs here in our office, Dr. Israel Cooper did not want to order fluids as she needs to be evaluated first. She may come tomorrow to be seen and bring her labs, go to ED or UC to get hydration today, or f/u with PCP. Shaun Roth said that she would notify the patient.

## 2017-11-21 ENCOUNTER — HOME CARE VISIT (OUTPATIENT)
Dept: SCHEDULING | Facility: HOME HEALTH | Age: 42
End: 2017-11-21
Payer: MEDICARE

## 2017-11-21 ENCOUNTER — OFFICE VISIT (OUTPATIENT)
Dept: SURGERY | Age: 42
End: 2017-11-21

## 2017-11-21 VITALS
DIASTOLIC BLOOD PRESSURE: 70 MMHG | WEIGHT: 292.5 LBS | SYSTOLIC BLOOD PRESSURE: 110 MMHG | TEMPERATURE: 97.9 F | HEIGHT: 64 IN | BODY MASS INDEX: 49.94 KG/M2 | HEART RATE: 118 BPM

## 2017-11-21 DIAGNOSIS — K63.2 ENTEROCUTANEOUS FISTULA: Primary | ICD-10-CM

## 2017-11-21 PROCEDURE — G0300 HHS/HOSPICE OF LPN EA 15 MIN: HCPCS

## 2017-11-21 PROCEDURE — G0151 HHCP-SERV OF PT,EA 15 MIN: HCPCS

## 2017-11-21 PROCEDURE — 3331090002 HH PPS REVENUE DEBIT

## 2017-11-21 PROCEDURE — 3331090001 HH PPS REVENUE CREDIT

## 2017-11-21 RX ORDER — NICOTINE POLACRILEX 2 MG
5000 LOZENGE BUCCAL
Refills: 1 | COMMUNITY
Start: 2017-10-30 | End: 2022-03-31

## 2017-11-21 NOTE — PROGRESS NOTES
1. Have you been to the ER, urgent care clinic since your last visit? Hospitalized since your last visit? [facility]    2. Have you seen or consulted any other health care providers outside of the [address] [place] [address] since your last visit? Include any pap smears or colon screening. Dr. Hudson Hand PCP, and Dr. Noe Bowman      Patient states after drinking anything within 5 minutes it is coming out into her back.

## 2017-11-22 ENCOUNTER — HOME CARE VISIT (OUTPATIENT)
Dept: SCHEDULING | Facility: HOME HEALTH | Age: 42
End: 2017-11-22
Payer: MEDICARE

## 2017-11-22 VITALS
SYSTOLIC BLOOD PRESSURE: 101 MMHG | HEART RATE: 112 BPM | TEMPERATURE: 97.7 F | RESPIRATION RATE: 20 BRPM | OXYGEN SATURATION: 98 % | DIASTOLIC BLOOD PRESSURE: 81 MMHG

## 2017-11-22 VITALS
TEMPERATURE: 97.1 F | DIASTOLIC BLOOD PRESSURE: 82 MMHG | RESPIRATION RATE: 20 BRPM | HEART RATE: 99 BPM | SYSTOLIC BLOOD PRESSURE: 92 MMHG | OXYGEN SATURATION: 98 %

## 2017-11-22 PROCEDURE — A4406 PECTIN BASED OSTOMY PASTE: HCPCS

## 2017-11-22 PROCEDURE — A4333 URINARY CATH ANCHOR DEVICE: HCPCS

## 2017-11-22 PROCEDURE — G0152 HHCP-SERV OF OT,EA 15 MIN: HCPCS

## 2017-11-22 PROCEDURE — 3331090001 HH PPS REVENUE CREDIT

## 2017-11-22 PROCEDURE — 3331090002 HH PPS REVENUE DEBIT

## 2017-11-22 PROCEDURE — A4369 SKIN BARRIER LIQUID PER OZ: HCPCS

## 2017-11-22 PROCEDURE — G0299 HHS/HOSPICE OF RN EA 15 MIN: HCPCS

## 2017-11-22 PROCEDURE — A4385 OST SKN BARRIER SLD EXT WEAR: HCPCS

## 2017-11-22 PROCEDURE — A4456 ADHESIVE REMOVER, WIPES: HCPCS

## 2017-11-22 PROCEDURE — A9270 NON-COVERED ITEM OR SERVICE: HCPCS

## 2017-11-23 VITALS
WEIGHT: 293 LBS | RESPIRATION RATE: 20 BRPM | TEMPERATURE: 97.1 F | SYSTOLIC BLOOD PRESSURE: 92 MMHG | HEART RATE: 99 BPM | DIASTOLIC BLOOD PRESSURE: 82 MMHG | BODY MASS INDEX: 50.5 KG/M2 | OXYGEN SATURATION: 98 %

## 2017-11-23 PROCEDURE — 3331090001 HH PPS REVENUE CREDIT

## 2017-11-23 PROCEDURE — 3331090002 HH PPS REVENUE DEBIT

## 2017-11-24 ENCOUNTER — HOME CARE VISIT (OUTPATIENT)
Dept: SCHEDULING | Facility: HOME HEALTH | Age: 42
End: 2017-11-24
Payer: MEDICARE

## 2017-11-24 ENCOUNTER — TELEPHONE (OUTPATIENT)
Dept: SURGERY | Age: 42
End: 2017-11-24

## 2017-11-24 VITALS
SYSTOLIC BLOOD PRESSURE: 102 MMHG | HEART RATE: 109 BPM | TEMPERATURE: 97.3 F | DIASTOLIC BLOOD PRESSURE: 58 MMHG | OXYGEN SATURATION: 99 % | RESPIRATION RATE: 18 BRPM

## 2017-11-24 PROCEDURE — G0299 HHS/HOSPICE OF RN EA 15 MIN: HCPCS

## 2017-11-24 PROCEDURE — 3331090001 HH PPS REVENUE CREDIT

## 2017-11-24 PROCEDURE — 3331090002 HH PPS REVENUE DEBIT

## 2017-11-24 NOTE — TELEPHONE ENCOUNTER
Telephone call with home health nurse. Patient receiving 1 liter normal saline for dehydration for past 2 days. Asking if should continue through weekend since home health still unable to draw blood for labs. Stated still have had time getting blood from veins. Advised may continue IV hydration for the next 2-3 days and also try to redraw labs. Advised if any questions or concerns to call the office.

## 2017-11-25 ENCOUNTER — APPOINTMENT (OUTPATIENT)
Dept: GENERAL RADIOLOGY | Age: 42
End: 2017-11-25
Attending: EMERGENCY MEDICINE
Payer: MEDICARE

## 2017-11-25 ENCOUNTER — HOSPITAL ENCOUNTER (EMERGENCY)
Age: 42
Discharge: HOME | End: 2017-11-25
Attending: EMERGENCY MEDICINE
Payer: MEDICARE

## 2017-11-25 ENCOUNTER — HOME CARE VISIT (OUTPATIENT)
Dept: SCHEDULING | Facility: HOME HEALTH | Age: 42
End: 2017-11-25
Payer: MEDICARE

## 2017-11-25 VITALS
TEMPERATURE: 98 F | DIASTOLIC BLOOD PRESSURE: 60 MMHG | SYSTOLIC BLOOD PRESSURE: 112 MMHG | OXYGEN SATURATION: 98 % | HEART RATE: 112 BPM | WEIGHT: 293 LBS | RESPIRATION RATE: 18 BRPM | BODY MASS INDEX: 50.5 KG/M2

## 2017-11-25 VITALS
DIASTOLIC BLOOD PRESSURE: 71 MMHG | WEIGHT: 293 LBS | RESPIRATION RATE: 18 BRPM | HEART RATE: 101 BPM | TEMPERATURE: 98 F | HEIGHT: 64 IN | OXYGEN SATURATION: 100 % | BODY MASS INDEX: 50.02 KG/M2 | SYSTOLIC BLOOD PRESSURE: 103 MMHG

## 2017-11-25 DIAGNOSIS — T82.898A OCCLUDED PICC LINE, INITIAL ENCOUNTER (HCC): Primary | ICD-10-CM

## 2017-11-25 PROCEDURE — 3331090002 HH PPS REVENUE DEBIT

## 2017-11-25 PROCEDURE — 74011250636 HC RX REV CODE- 250/636: Performed by: EMERGENCY MEDICINE

## 2017-11-25 PROCEDURE — 3331090001 HH PPS REVENUE CREDIT

## 2017-11-25 PROCEDURE — 99282 EMERGENCY DEPT VISIT SF MDM: CPT

## 2017-11-25 PROCEDURE — 36593 DECLOT VASCULAR DEVICE: CPT

## 2017-11-25 PROCEDURE — 74011000250 HC RX REV CODE- 250: Performed by: EMERGENCY MEDICINE

## 2017-11-25 PROCEDURE — 71010 XR CHEST PORT: CPT

## 2017-11-25 RX ADMIN — ALTEPLASE 2 MG: 2.2 INJECTION, POWDER, LYOPHILIZED, FOR SOLUTION INTRAVENOUS at 19:33

## 2017-11-25 NOTE — ED NOTES
Pt presents with double lumen RIGHT basilic PICC that flushes with no blood return. Pt referred by home health for possible Activase administration.

## 2017-11-25 NOTE — ED PROVIDER NOTES
HPI Comments: 46 y.o. female with extensive past medical history, please see list, significant for Crohn's disease with multiple abdominal surgeries, DVT, incarcerated ventral hernia, right flank pain, stroke, and seizures who presents from home with chief complaint of PICC line issue. Patient notes she was unable to get blood return from her PICC line on her right arm this morning. Patient mentions hx of the present sx and it was resolved with Activase. Patient reports hx of chronic abdominal pain from Crohn's disease, which she has had multiple surgeries in the past for. Patient denies fever, chills, nausea, and vomiting. There are no other acute medical concerns at this time. Old Chart Review: Per note, patient was evaluated here on 10/27/17 for similar complaints of a vascular access problem. This was resolved after alteplase and TPA. Nuria was admitted here from 10/15/17 through the 27th for SIRS and intractable abdominal pain. Nuria had abdominal fistula drain placement and a CT scan that revealed large anterior abdominal wound, a sote of known enterocutaneous fistula. Mild stranding around anterior abdominal wound as well as in abdominal mesentery. Social hx: Tobacco Use: No (former smoker), Alcohol Use: No    PCP: Ladi Coy MD    Note written by Rekha Capps, as dictated by Eusebio Altman MD 6:40 PM      The history is provided by the patient and medical records.         Past Medical History:   Diagnosis Date    Cro[facility]'s disease ([facility] [address] 8/15/2011    DVT (deep venous thrombosis) ([facility] [address] 8/15/2011    Incarcerated ventral hernia 8/15/2011    Right flank pain 8/22/2011    Seizures ([facility] [address]     Stroke [facility])        Past Surgical History:   Procedure Laterality Date    HX OTHER SURGICAL      multiple procedures related to her Crohn's disease    [place] LAP, INCISIONAL HERNIA REPAIR,INCARCERATED  9-10-08    dr. Afshan Stein         Family History:   Problem Relation Age of Onset    Hypertension Father     Stroke Father     Other Father      arthritis       Social History     Social History    Marital status: SINGLE     Spouse name: N/A    Number of children: N/A    Years of education: N/A     Occupational History    Not on file. Social History Main Topics    Smoking status: Former Smoker    Smokeless tobacco: Never Used    Alcohol use No    Drug use: Not on file    Sexual activity: Not on file     Other Topics Concern    Not on file     Social History Narrative         ALLERGIES: Demerol [meperidine]; Soma [carisoprodol]; Sulfa (sulfonamide antibiotics); and Toradol [ketorolac tromethamine]    Review of Systems   Constitutional: Negative for chills, diaphoresis and fever. HENT: Negative for congestion, postnasal drip, rhinorrhea and sore throat. Eyes: Negative for photophobia, discharge, redness and visual disturbance. Respiratory: Negative for cough, chest tightness, shortness of breath and wheezing. Cardiovascular: Negative for chest pain, palpitations and leg swelling. Gastrointestinal: Positive for abdominal pain (chronic). Negative for abdominal distention, blood in stool, constipation, diarrhea, nausea and vomiting. Genitourinary: Negative for difficulty urinating, dysuria, frequency, hematuria and urgency. Musculoskeletal: Negative for arthralgias, back pain, joint swelling and myalgias. Skin: Negative for color change and rash. Neurological: Negative for dizziness, speech difficulty, weakness, light-headedness, numbness (feet tingling) and headaches. Psychiatric/Behavioral: Negative for confusion. The patient is not nervous/anxious. All other systems reviewed and are negative. Vitals:    11/25/17 1740   BP: 105/77   Pulse: (!) 112   Resp: 20   Temp: 98 °F (36.7 °C)   SpO2: 100%   Weight: 142 kg (313 lb 2 oz)   Height: 5' 4\" (1.626 m)            Physical Exam   Constitutional: She is oriented to person, place, and time.  She appears well-developed and well-nourished. No distress. HENT:   Head: Normocephalic and atraumatic. Right Ear: External ear normal.   Left Ear: External ear normal.   Nose: Nose normal.   Mouth/Throat: Oropharynx is clear and moist.   Eyes: Conjunctivae and EOM are normal. Pupils are equal, round, and reactive to light. No scleral icterus. Neck: Normal range of motion. Neck supple. No JVD present. No tracheal deviation present. No thyromegaly present. Cardiovascular: Normal rate, regular rhythm and normal heart sounds. Exam reveals no gallop and no friction rub. No murmur heard. Right arm PICC line in place   Regular rate and rhythm without abnormalities    Pulmonary/Chest: Effort normal and breath sounds normal. No respiratory distress. She has no wheezes. She has no rales. She exhibits no tenderness. Clear lungs    Abdominal: Soft. Bowel sounds are normal. She exhibits no distension and no mass. There is no tenderness. There is no rebound and no guarding. Normal active bowel sounds    Musculoskeletal: Normal range of motion. She exhibits no edema or tenderness. Lymphadenopathy:     She has no cervical adenopathy. Neurological: She is alert and oriented to person, place, and time. She has normal strength. She displays no atrophy and no tremor. No cranial nerve deficit. She exhibits normal muscle tone. Coordination and gait normal.   Skin: Skin is warm and dry. No rash noted. She is not diaphoretic. No erythema. Psychiatric: She has a normal mood and affect. Her behavior is normal. Judgment and thought content normal.   Nursing note and vitals reviewed. Note written by Rekha Flores, as dictated by Susan Johnson MD 6:02 PM    [facility]  Number of Diagnoses or Management Options  Diagnosis management comments: Zuleyma Houser  Impression: 51-year-old female presenting to the emergency department with a problematic PICC line in that fluids are able to infuse [place] no bloods or able to be drawn.     Plan of care we checked for placement we'll inject with activase hopefully relieve clog.     ED Course       Procedures

## 2017-11-25 NOTE — ED TRIAGE NOTES
Triage Note: Patient is coming in with needing to have PICC line de-clogged. Patient states home health is able to get fluids through but unable to get any blood return.

## 2017-11-26 ENCOUNTER — HOME CARE VISIT (OUTPATIENT)
Dept: SCHEDULING | Facility: HOME HEALTH | Age: 42
End: 2017-11-26
Payer: MEDICARE

## 2017-11-26 PROCEDURE — 3331090002 HH PPS REVENUE DEBIT

## 2017-11-26 PROCEDURE — 3331090001 HH PPS REVENUE CREDIT

## 2017-11-26 PROCEDURE — G0299 HHS/HOSPICE OF RN EA 15 MIN: HCPCS

## 2017-11-26 NOTE — ED NOTES
Pt given discharge instructions. Pt had no questions regarding instructions. Pt discharged via wheelchair with family providing transportation .

## 2017-11-26 NOTE — ED NOTES
Positive blood return in both PICC lines now after alteplase. Discarded correct amount of blood and then both lines gently irrigated with NS with no difficulty.  MD notified and aware

## 2017-11-27 ENCOUNTER — HOME CARE VISIT (OUTPATIENT)
Dept: SCHEDULING | Facility: HOME HEALTH | Age: 42
End: 2017-11-27
Payer: MEDICARE

## 2017-11-27 VITALS
DIASTOLIC BLOOD PRESSURE: 64 MMHG | OXYGEN SATURATION: 98 % | SYSTOLIC BLOOD PRESSURE: 110 MMHG | RESPIRATION RATE: 18 BRPM | TEMPERATURE: 97.5 F | HEART RATE: 98 BPM

## 2017-11-27 PROCEDURE — G0300 HHS/HOSPICE OF LPN EA 15 MIN: HCPCS

## 2017-11-27 PROCEDURE — G0151 HHCP-SERV OF PT,EA 15 MIN: HCPCS

## 2017-11-27 PROCEDURE — 3331090001 HH PPS REVENUE CREDIT

## 2017-11-27 PROCEDURE — 3331090002 HH PPS REVENUE DEBIT

## 2017-11-28 ENCOUNTER — HOME CARE VISIT (OUTPATIENT)
Dept: SCHEDULING | Facility: HOME HEALTH | Age: 42
End: 2017-11-28
Payer: MEDICARE

## 2017-11-28 VITALS — RESPIRATION RATE: 20 BRPM | TEMPERATURE: 96.7 F | OXYGEN SATURATION: 99 %

## 2017-11-28 PROCEDURE — G0152 HHCP-SERV OF OT,EA 15 MIN: HCPCS

## 2017-11-28 PROCEDURE — 3331090001 HH PPS REVENUE CREDIT

## 2017-11-28 PROCEDURE — 3331090002 HH PPS REVENUE DEBIT

## 2017-11-28 PROCEDURE — G0300 HHS/HOSPICE OF LPN EA 15 MIN: HCPCS

## 2017-11-29 ENCOUNTER — HOME CARE VISIT (OUTPATIENT)
Dept: SCHEDULING | Facility: HOME HEALTH | Age: 42
End: 2017-11-29
Payer: MEDICARE

## 2017-11-29 VITALS
HEART RATE: 90 BPM | RESPIRATION RATE: 18 BRPM | DIASTOLIC BLOOD PRESSURE: 60 MMHG | OXYGEN SATURATION: 98 % | SYSTOLIC BLOOD PRESSURE: 90 MMHG | TEMPERATURE: 97.3 F

## 2017-11-29 PROCEDURE — 3331090002 HH PPS REVENUE DEBIT

## 2017-11-29 PROCEDURE — A4357 BEDSIDE DRAINAGE BAG: HCPCS

## 2017-11-29 PROCEDURE — A7000 DISPOSABLE CANISTER FOR PUMP: HCPCS

## 2017-11-29 PROCEDURE — 3331090001 HH PPS REVENUE CREDIT

## 2017-11-29 PROCEDURE — A4628 OROPHARYNGEAL SUCTION CATH: HCPCS

## 2017-11-29 PROCEDURE — G0300 HHS/HOSPICE OF LPN EA 15 MIN: HCPCS

## 2017-11-30 VITALS
RESPIRATION RATE: 18 BRPM | HEART RATE: 98 BPM | TEMPERATURE: 97.3 F | OXYGEN SATURATION: 98 % | DIASTOLIC BLOOD PRESSURE: 64 MMHG | SYSTOLIC BLOOD PRESSURE: 110 MMHG

## 2017-11-30 PROCEDURE — 3331090001 HH PPS REVENUE CREDIT

## 2017-11-30 PROCEDURE — 3331090002 HH PPS REVENUE DEBIT

## 2017-12-01 ENCOUNTER — HOME CARE VISIT (OUTPATIENT)
Dept: HOME HEALTH SERVICES | Facility: HOME HEALTH | Age: 42
End: 2017-12-01
Payer: MEDICARE

## 2017-12-01 PROCEDURE — 3331090001 HH PPS REVENUE CREDIT

## 2017-12-01 PROCEDURE — 3331090002 HH PPS REVENUE DEBIT

## 2017-12-02 PROCEDURE — 3331090001 HH PPS REVENUE CREDIT

## 2017-12-02 PROCEDURE — 3331090002 HH PPS REVENUE DEBIT

## 2017-12-03 PROCEDURE — 3331090001 HH PPS REVENUE CREDIT

## 2017-12-03 PROCEDURE — 3331090002 HH PPS REVENUE DEBIT

## 2017-12-04 ENCOUNTER — HOME CARE VISIT (OUTPATIENT)
Dept: SCHEDULING | Facility: HOME HEALTH | Age: 42
End: 2017-12-04
Payer: MEDICARE

## 2017-12-04 PROCEDURE — 3331090002 HH PPS REVENUE DEBIT

## 2017-12-04 PROCEDURE — G0299 HHS/HOSPICE OF RN EA 15 MIN: HCPCS

## 2017-12-04 PROCEDURE — 3331090001 HH PPS REVENUE CREDIT

## 2017-12-05 ENCOUNTER — HOME CARE VISIT (OUTPATIENT)
Dept: SCHEDULING | Facility: HOME HEALTH | Age: 42
End: 2017-12-05
Payer: MEDICARE

## 2017-12-05 PROCEDURE — 3331090001 HH PPS REVENUE CREDIT

## 2017-12-05 PROCEDURE — G0151 HHCP-SERV OF PT,EA 15 MIN: HCPCS

## 2017-12-05 PROCEDURE — G0152 HHCP-SERV OF OT,EA 15 MIN: HCPCS

## 2017-12-05 PROCEDURE — G0299 HHS/HOSPICE OF RN EA 15 MIN: HCPCS

## 2017-12-05 PROCEDURE — 3331090002 HH PPS REVENUE DEBIT

## 2017-12-06 ENCOUNTER — HOME CARE VISIT (OUTPATIENT)
Dept: SCHEDULING | Facility: HOME HEALTH | Age: 42
End: 2017-12-06
Payer: MEDICARE

## 2017-12-06 ENCOUNTER — HOME CARE VISIT (OUTPATIENT)
Dept: HOME HEALTH SERVICES | Facility: HOME HEALTH | Age: 42
End: 2017-12-06
Payer: MEDICARE

## 2017-12-06 VITALS
DIASTOLIC BLOOD PRESSURE: 69 MMHG | OXYGEN SATURATION: 99 % | HEART RATE: 79 BPM | SYSTOLIC BLOOD PRESSURE: 100 MMHG | TEMPERATURE: 97.6 F | RESPIRATION RATE: 18 BRPM

## 2017-12-06 VITALS
DIASTOLIC BLOOD PRESSURE: 52 MMHG | HEART RATE: 90 BPM | RESPIRATION RATE: 18 BRPM | OXYGEN SATURATION: 99 % | TEMPERATURE: 98.3 F | SYSTOLIC BLOOD PRESSURE: 108 MMHG

## 2017-12-06 VITALS
HEART RATE: 85 BPM | SYSTOLIC BLOOD PRESSURE: 132 MMHG | DIASTOLIC BLOOD PRESSURE: 92 MMHG | RESPIRATION RATE: 16 BRPM | TEMPERATURE: 96.4 F | OXYGEN SATURATION: 99 %

## 2017-12-06 VITALS
DIASTOLIC BLOOD PRESSURE: 64 MMHG | RESPIRATION RATE: 18 BRPM | SYSTOLIC BLOOD PRESSURE: 118 MMHG | HEART RATE: 84 BPM | TEMPERATURE: 97.9 F | OXYGEN SATURATION: 98 %

## 2017-12-06 PROCEDURE — A4426 OST PCH DRAIN 2 PIECE SYSTEM: HCPCS

## 2017-12-06 PROCEDURE — A7000 DISPOSABLE CANISTER FOR PUMP: HCPCS

## 2017-12-06 PROCEDURE — A4406 PECTIN BASED OSTOMY PASTE: HCPCS

## 2017-12-06 PROCEDURE — A4371 SKIN BARRIER POWDER PER OZ: HCPCS

## 2017-12-06 PROCEDURE — A4373 SKIN BARRIER WITH FLANGE: HCPCS

## 2017-12-06 PROCEDURE — A4333 URINARY CATH ANCHOR DEVICE: HCPCS

## 2017-12-06 PROCEDURE — A6216 NON-STERILE GAUZE<=16 SQ IN: HCPCS

## 2017-12-06 PROCEDURE — A4385 OST SKN BARRIER SLD EXT WEAR: HCPCS

## 2017-12-06 PROCEDURE — G0299 HHS/HOSPICE OF RN EA 15 MIN: HCPCS

## 2017-12-06 PROCEDURE — 3331090002 HH PPS REVENUE DEBIT

## 2017-12-06 PROCEDURE — A4357 BEDSIDE DRAINAGE BAG: HCPCS

## 2017-12-06 PROCEDURE — 3331090001 HH PPS REVENUE CREDIT

## 2017-12-07 ENCOUNTER — HOME CARE VISIT (OUTPATIENT)
Dept: SCHEDULING | Facility: HOME HEALTH | Age: 42
End: 2017-12-07
Payer: MEDICARE

## 2017-12-07 ENCOUNTER — APPOINTMENT (OUTPATIENT)
Dept: GENERAL RADIOLOGY | Age: 42
End: 2017-12-07
Attending: FAMILY MEDICINE
Payer: MEDICARE

## 2017-12-07 ENCOUNTER — HOSPITAL ENCOUNTER (EMERGENCY)
Age: 42
Discharge: HOME | End: 2017-12-07
Attending: EMERGENCY MEDICINE
Payer: MEDICARE

## 2017-12-07 VITALS
BODY MASS INDEX: 53.74 KG/M2 | DIASTOLIC BLOOD PRESSURE: 69 MMHG | OXYGEN SATURATION: 100 % | RESPIRATION RATE: 18 BRPM | TEMPERATURE: 98.5 F | SYSTOLIC BLOOD PRESSURE: 101 MMHG | WEIGHT: 293 LBS | HEART RATE: 88 BPM

## 2017-12-07 DIAGNOSIS — I82.621 ACUTE DEEP VEIN THROMBOSIS (DVT) OF RIGHT UPPER EXTREMITY, UNSPECIFIED VEIN (HCC): Primary | ICD-10-CM

## 2017-12-07 LAB
ANION GAP SERPL CALC-SCNC: 8 MMOL/L (ref 5–15)
BASOPHILS # BLD: 0 K/UL (ref 0–0.1)
BASOPHILS NFR BLD: 0 % (ref 0–1)
BUN SERPL-MCNC: 32 MG/DL (ref 6–20)
BUN/CREAT SERPL: 48 (ref 12–20)
CALCIUM SERPL-MCNC: 8.7 MG/DL (ref 8.5–10.1)
CHLORIDE SERPL-SCNC: 104 MMOL/L (ref 97–108)
CO2 SERPL-SCNC: 24 MMOL/L (ref 21–32)
CREAT SERPL-MCNC: 0.67 MG/DL (ref 0.55–1.02)
EOSINOPHIL # BLD: 0 K/UL (ref 0–0.4)
EOSINOPHIL NFR BLD: 0 % (ref 0–7)
ERYTHROCYTE [DISTWIDTH] IN BLOOD BY AUTOMATED COUNT: 16.6 % (ref 11.5–14.5)
GLUCOSE SERPL-MCNC: 93 MG/DL (ref 65–100)
HCT VFR BLD AUTO: 37.7 % (ref 35–47)
HGB BLD-MCNC: 12.3 G/DL (ref 11.5–16)
INR PPP: 1 (ref 0.9–1.1)
LYMPHOCYTES # BLD: 4.2 K/UL (ref 0.8–3.5)
LYMPHOCYTES NFR BLD: 29 % (ref 12–49)
MCH RBC QN AUTO: 31 PG (ref 26–34)
MCHC RBC AUTO-ENTMCNC: 32.6 G/DL (ref 30–36.5)
MCV RBC AUTO: 95 FL (ref 80–99)
MONOCYTES # BLD: 1.2 K/UL (ref 0–1)
MONOCYTES NFR BLD: 8 % (ref 5–13)
NEUTS SEG # BLD: 9.1 K/UL (ref 1.8–8)
NEUTS SEG NFR BLD: 63 % (ref 32–75)
PLATELET # BLD AUTO: 149 K/UL (ref 150–400)
POTASSIUM SERPL-SCNC: 4.8 MMOL/L (ref 3.5–5.1)
PROTHROMBIN TIME: 10 SEC (ref 9–11.1)
RBC # BLD AUTO: 3.97 M/UL (ref 3.8–5.2)
SODIUM SERPL-SCNC: 136 MMOL/L (ref 136–145)
WBC # BLD AUTO: 14.6 K/UL (ref 3.6–11)

## 2017-12-07 PROCEDURE — 3331090002 HH PPS REVENUE DEBIT

## 2017-12-07 PROCEDURE — 71020 XR CHEST PA LAT: CPT

## 2017-12-07 PROCEDURE — 80048 BASIC METABOLIC PNL TOTAL CA: CPT | Performed by: EMERGENCY MEDICINE

## 2017-12-07 PROCEDURE — 74011250636 HC RX REV CODE- 250/636: Performed by: FAMILY MEDICINE

## 2017-12-07 PROCEDURE — 3331090001 HH PPS REVENUE CREDIT

## 2017-12-07 PROCEDURE — 99285 EMERGENCY DEPT VISIT HI MDM: CPT

## 2017-12-07 PROCEDURE — 85610 PROTHROMBIN TIME: CPT | Performed by: EMERGENCY MEDICINE

## 2017-12-07 PROCEDURE — 36415 COLL VENOUS BLD VENIPUNCTURE: CPT | Performed by: FAMILY MEDICINE

## 2017-12-07 PROCEDURE — G0300 HHS/HOSPICE OF LPN EA 15 MIN: HCPCS

## 2017-12-07 PROCEDURE — 36569 INSJ PICC 5 YR+ W/O IMAGING: CPT | Performed by: FAMILY MEDICINE

## 2017-12-07 PROCEDURE — 96360 HYDRATION IV INFUSION INIT: CPT

## 2017-12-07 PROCEDURE — 93971 EXTREMITY STUDY: CPT

## 2017-12-07 PROCEDURE — 85025 COMPLETE CBC W/AUTO DIFF WBC: CPT | Performed by: FAMILY MEDICINE

## 2017-12-07 PROCEDURE — 96372 THER/PROPH/DIAG INJ SC/IM: CPT

## 2017-12-07 PROCEDURE — 96361 HYDRATE IV INFUSION ADD-ON: CPT

## 2017-12-07 RX ORDER — ENOXAPARIN SODIUM 100 MG/ML
80 INJECTION SUBCUTANEOUS ONCE
Status: COMPLETED | OUTPATIENT
Start: 2017-12-07 | End: 2017-12-07

## 2017-12-07 RX ORDER — ENOXAPARIN SODIUM 100 MG/ML
60 INJECTION SUBCUTANEOUS ONCE
Status: COMPLETED | OUTPATIENT
Start: 2017-12-07 | End: 2017-12-07

## 2017-12-07 RX ORDER — ENOXAPARIN SODIUM 150 MG/ML
1 INJECTION SUBCUTANEOUS EVERY 12 HOURS
Qty: 14 ML | Refills: 0 | Status: SHIPPED | OUTPATIENT
Start: 2017-12-07 | End: 2017-12-14

## 2017-12-07 RX ORDER — ENOXAPARIN SODIUM 100 MG/ML
1 INJECTION SUBCUTANEOUS
Status: DISCONTINUED | OUTPATIENT
Start: 2017-12-07 | End: 2017-12-07 | Stop reason: CLARIF

## 2017-12-07 RX ADMIN — SODIUM CHLORIDE 1000 ML: 900 INJECTION, SOLUTION INTRAVENOUS at 08:27

## 2017-12-07 RX ADMIN — ENOXAPARIN SODIUM 80 MG: 80 INJECTION SUBCUTANEOUS at 12:48

## 2017-12-07 RX ADMIN — ENOXAPARIN SODIUM 60 MG: 80 INJECTION SUBCUTANEOUS at 12:49

## 2017-12-07 NOTE — ED PROVIDER NOTES
HPI Comments: Ms. Dustin Verma is a 46year old female with medical history of Crohn's disease (sees Dr Kati Lopez) with several complications. She is s/p total colectomy with end ileostomy after multiple abdominal surgery. She is currently on TPN. She has a history of MRSA infections. She is here today because her PICC line is not functioning as it normally does. She states that she has not been receiving feedings through her PICC line and she feels very dehydrated. She reports swelling and tenderness in her RUE at the site of her PICC. She denies fever or chills. The history is provided by the patient. Past Medical History:   Diagnosis Date    Crohn's disease ([facility] [address] 8/15/2011    DVT (deep venous thrombosis) ([facility] [address] 8/15/2011    Incarcerated ventral hernia 8/15/2011    Right flank pain 8/22/2011    Seizures ([facility] [address]     Stroke [facility])        Past Surgical History:   Procedure Laterality Date    HX OTHER SURGICAL      multiple procedures related to her Crohn's disease    [place] LAP, INCISIONAL HERNIA REPAIR,INCARCERATED  9-10-08    dr. Jean Sosa         Family History:   Problem Relation Age of Onset    Hypertension Father     Stroke Father     Other Father      arthritis       Social History     Social History    Marital status: SINGLE     Spouse name: N/A    Number of children: N/A    Years of education: N/A     Occupational History    Not on file. Social History Main Topics    Smoking status: Former Smoker    Smokeless tobacco: Never Used    Alcohol use No    Drug use: Not on file    Sexual activity: Not on file     Other Topics Concern    Not on file     Social History Narrative         ALLERGIES: Demerol [meperidine]; Soma [carisoprodol]; Sulfa (sulfonamide antibiotics); and Toradol [ketorolac tromethamine]    Review of Systems   Constitutional: Negative for chills and fever. HENT: Negative. Negative for trouble swallowing. Eyes: Negative.     Respiratory: Negative for cough, chest tightness, shortness of breath and wheezing. Cardiovascular: Negative for chest pain, palpitations and leg swelling. Gastrointestinal: Positive for abdominal pain. Negative for abdominal distention, blood in stool, nausea and vomiting. Genitourinary: Negative. Musculoskeletal: Negative for back pain. Skin: Negative for rash. Neurological: Negative. Psychiatric/Behavioral: Negative. All other systems reviewed and are negative. Vitals:    12/07/17 0808   BP: 107/78   Pulse: 88   Resp: 18   Temp: 98.5 °F (36.9 °C)            Physical Exam   Constitutional: She is oriented to person, place, and time. She appears well-developed. No distress. HENT:   Head: Normocephalic and atraumatic. Nose: Nose normal.   Mouth/Throat: Oropharynx is clear and moist.   Eyes: Conjunctivae are normal.   Neck: Neck supple. Cardiovascular: Normal rate, regular rhythm and normal heart sounds. No murmur heard. Pulmonary/Chest: Effort normal and breath sounds normal. No respiratory distress. She has no wheezes. Abdominal: Soft. She exhibits no distension. There is tenderness. There is no rebound and no guarding. Ileostomy with bag present. Neurological: She is alert and oriented to person, place, and time. Skin: Skin is warm. She is not diaphoretic. RUE near site of PICC line is slighty erythematous. Some soft tissue edema is present. No warmth. Tender to palpation. Nursing note and vitals reviewed. MDM  Number of Diagnoses or Management Options  Diagnosis management comments: 45 yo F with dysfunctional PICC line and RUE swelling  - PICC team to evaluate  - CBC, BMP, PT/INR  - bolus IVF  - CXR, RUE U/S    12:08 PM  Spoke with Vascular Surgery, Dr. Hyla Burkitt who recommends anticoagulation. Spoke with Dr. Sumanth Pina who recommends lovenox bridge to coumadin. Will discharge with PCP follow up.     ED Course       Procedures

## 2017-12-07 NOTE — PROCEDURES
[facility]  *** FINAL REPORT ***    Name: Mirian Lemus  MRN: [id number]    Outpatient  : 15 Dec 1975  HIS Order #: [id number]  [id number] [facility] Visit #: [id number]  Date: 07 Dec 2017    TYPE OF TEST: Peripheral Venous Testing    REASON FOR TEST  Pain in limb, Limb swelling    Right Arm:-  Deep venous thrombosis:           Yes  Proximal extent of thrombus:      Subclavian  Superficial venous thrombosis:    No      INTERPRETATION/FINDINGS  PROCEDURE: RIGHT UPPER EXTREMITY VENOUS DUPLEX: Evaluation of upper  extremity veins with ultrasound (B-mode imaging, pulsed Doppler, color   Doppler). Includes the internal jugular, subclavian, axillary,  brachial, radial, ulnar, basilic, and cephalic veins. FINDINGS: Technically difficult study secondary to patients body  habitus. In the right upper extrmity, the subclavian is seen with no  color flow with augment suggesting occlusive thrombus. Thrombus  appears to be acute. Unable to visualize the cephalic (upper arm)  vein. Gray scale and color flow duplex images of the veins visualized  of the right upper extremity and bilateral subclavian veins  demonstrate normal compressibility, absence of filing defects, reflux  or phlebitic changes of the internal jugular, bilateral subclavian,  axillary, upper arm and forearm veins of the right arm. CONCLUSION: Right upper extremity venous duplex positive for deep vein   thrombosis involving the subclavian vein. No evidence of thrombus in  contralateral left subclavian vein. ADDITIONAL COMMENTS    I have personally reviewed the data relevant to the interpretation of  this  study. TECHNOLOGIST: Justin Freed RDCS  Signed: 2017 11:13 AM    PHYSICIAN: Nestor Barahona.  Dinesh Jasso MD  Signed: 2017 11:58 AM

## 2017-12-07 NOTE — PROGRESS NOTES
VAT Note:  Chart reviewed for PICC placement evaluation. Areas reviewed include, but are not limited to, patient's current and past H&P, Lab and Procedure Results, all notes, media records and medications. Called by ED to assess existing right arm PICC for complaints of new swelling and tenderness in right arm. Over to assess patient with right basilic PICC line in place. Dressing C/D/I with date of dressing change 12/4/17. No redness at site, line 3 cm out. Dual lumen PICC with positive blood return and flushing easily. Posterior aspect of right arm with complaints of tenderness and swelling. Difficult to assess swelling related to body habitus. Recommend chest x-ray for assessment of line and tip placement.

## 2017-12-07 NOTE — DISCHARGE INSTRUCTIONS
Deep Vein Thrombosis: Care Instructions  Your Care Instructions    A deep vein thrombosis (DVT) is a blood clot in certain veins of the legs, pelvis, or arms. Blood clots in these veins need to be treated because they can get bigger, break loose, and travel through the bloodstream to the lungs. A blood clot in a lung can be life-threatening. The doctor may have given you a blood thinner (anticoagulant). A blood thinner can stop the blood clot from growing larger and prevent new clots from forming. You will need to take a blood thinner for 3 to 6 months or longer. The doctor has checked you carefully, but problems can develop later. If you notice any problems or new symptoms, get medical treatment right away. Follow-up care is a key part of your treatment and safety. Be sure to make and go to all appointments, and call your doctor if you are having problems. It's also a good idea to know your test results and keep a list of the medicines you take. How can you care for yourself at home? · Take your medicines exactly as prescribed. Call your doctor if you think you are having a problem with your medicine. · If you are taking a blood thinner, be sure you get instructions about how to take your medicine safely. Blood thinners can cause serious bleeding problems. · Wear compression stockings if your doctor recommends them. These stockings are tighter at the feet than on the legs. They may reduce pain and swelling in your legs. But there are different types of stockings, and they need to fit right. So your doctor will recommend what you need. · When you sit, use a pillow to raise the arm or leg that has the blood clot. Try to keep it above the level of your heart. When should you call for help? Call 911 anytime you think you may need emergency care. For example, call if:  ? · You passed out (lost consciousness). ? · You have symptoms of a blood clot in your lung (called a pulmonary embolism).  These include:  ¨ Sudden chest pain. ¨ Trouble breathing. ¨ Coughing up blood. ?Call your doctor now or seek immediate medical care if:  ? · You have new or worse trouble breathing. ? · You are dizzy or lightheaded, or you feel like you may faint. ? · You have symptoms of a blood clot in your arm or leg. These may include:  ¨ Pain in the arm, calf, back of the knee, thigh, or groin. ¨ Redness and swelling in the arm, leg, or groin. ? Watch closely for changes in your health, and be sure to contact your doctor if:  ? · You do not get better as expected. Where can you learn more? Go to http://cornell-terrell.info/. Enter Y221 in the search box to learn more about \"Deep Vein Thrombosis: Care Instructions. \"  Current as of: March 20, 2017  Content Version: 11.4  © 7245-1417 [employer]. Care instructions adapted under license by [employer] (which disclaims liability or warranty for this information).  If you have questions about a medical condition or this instruction, always ask your healthcare professional. [place] [address] any warranty or liability for your use of this information.

## 2017-12-07 NOTE — ED NOTES
Patient reports being dropped by EMS during transport to ambulance from house.  Turned patient, small abrasion noted to left buttock thin line about 4 inchis in length

## 2017-12-07 NOTE — ED TRIAGE NOTES
Pt arrives via EMS. Pt states she has a right upper arm PICC line and states when she was getting TPN last night around 1900 the PICC line infiltrated. Pt reports right arm swelling and chest swelling from the infiltration starting around 0230 this morning.

## 2017-12-08 PROCEDURE — 3331090001 HH PPS REVENUE CREDIT

## 2017-12-08 PROCEDURE — 3331090002 HH PPS REVENUE DEBIT

## 2017-12-09 PROCEDURE — 3331090002 HH PPS REVENUE DEBIT

## 2017-12-09 PROCEDURE — 3331090001 HH PPS REVENUE CREDIT

## 2017-12-10 PROCEDURE — 3331090001 HH PPS REVENUE CREDIT

## 2017-12-10 PROCEDURE — 3331090002 HH PPS REVENUE DEBIT

## 2017-12-11 ENCOUNTER — HOME CARE VISIT (OUTPATIENT)
Dept: SCHEDULING | Facility: HOME HEALTH | Age: 42
End: 2017-12-11
Payer: MEDICARE

## 2017-12-11 VITALS
OXYGEN SATURATION: 99 % | WEIGHT: 293 LBS | DIASTOLIC BLOOD PRESSURE: 56 MMHG | BODY MASS INDEX: 58.67 KG/M2 | RESPIRATION RATE: 18 BRPM | HEART RATE: 83 BPM | TEMPERATURE: 97.7 F | SYSTOLIC BLOOD PRESSURE: 96 MMHG

## 2017-12-11 PROCEDURE — 3331090001 HH PPS REVENUE CREDIT

## 2017-12-11 PROCEDURE — G0300 HHS/HOSPICE OF LPN EA 15 MIN: HCPCS

## 2017-12-11 PROCEDURE — 3331090002 HH PPS REVENUE DEBIT

## 2017-12-12 ENCOUNTER — HOME CARE VISIT (OUTPATIENT)
Dept: SCHEDULING | Facility: HOME HEALTH | Age: 42
End: 2017-12-12
Payer: MEDICARE

## 2017-12-12 PROCEDURE — 3331090001 HH PPS REVENUE CREDIT

## 2017-12-12 PROCEDURE — A4649 SURGICAL SUPPLIES: HCPCS

## 2017-12-12 PROCEDURE — 3331090002 HH PPS REVENUE DEBIT

## 2017-12-12 PROCEDURE — G0151 HHCP-SERV OF PT,EA 15 MIN: HCPCS

## 2017-12-12 PROCEDURE — E0325 URINAL MALE JUG-TYPE: HCPCS

## 2017-12-13 ENCOUNTER — HOME CARE VISIT (OUTPATIENT)
Dept: SCHEDULING | Facility: HOME HEALTH | Age: 42
End: 2017-12-13
Payer: MEDICARE

## 2017-12-13 VITALS
SYSTOLIC BLOOD PRESSURE: 122 MMHG | HEART RATE: 97 BPM | OXYGEN SATURATION: 96 % | TEMPERATURE: 97.9 F | DIASTOLIC BLOOD PRESSURE: 68 MMHG | RESPIRATION RATE: 18 BRPM

## 2017-12-13 PROCEDURE — 3331090001 HH PPS REVENUE CREDIT

## 2017-12-13 PROCEDURE — 3331090002 HH PPS REVENUE DEBIT

## 2017-12-13 PROCEDURE — G0299 HHS/HOSPICE OF RN EA 15 MIN: HCPCS

## 2017-12-13 PROCEDURE — G0152 HHCP-SERV OF OT,EA 15 MIN: HCPCS

## 2017-12-14 VITALS
TEMPERATURE: 97 F | DIASTOLIC BLOOD PRESSURE: 85 MMHG | BODY MASS INDEX: 59.05 KG/M2 | RESPIRATION RATE: 16 BRPM | HEART RATE: 78 BPM | SYSTOLIC BLOOD PRESSURE: 111 MMHG | OXYGEN SATURATION: 98 % | WEIGHT: 293 LBS

## 2017-12-14 VITALS
RESPIRATION RATE: 18 BRPM | TEMPERATURE: 98.9 F | DIASTOLIC BLOOD PRESSURE: 62 MMHG | OXYGEN SATURATION: 95 % | SYSTOLIC BLOOD PRESSURE: 110 MMHG | HEART RATE: 90 BPM

## 2017-12-14 PROCEDURE — 3331090002 HH PPS REVENUE DEBIT

## 2017-12-14 PROCEDURE — 3331090001 HH PPS REVENUE CREDIT

## 2017-12-15 ENCOUNTER — TELEPHONE (OUTPATIENT)
Dept: SURGERY | Age: 42
End: 2017-12-15

## 2017-12-15 ENCOUNTER — HOME CARE VISIT (OUTPATIENT)
Dept: SCHEDULING | Facility: HOME HEALTH | Age: 42
End: 2017-12-15
Payer: MEDICARE

## 2017-12-15 VITALS
RESPIRATION RATE: 17 BRPM | HEART RATE: 70 BPM | TEMPERATURE: 98.2 F | SYSTOLIC BLOOD PRESSURE: 112 MMHG | DIASTOLIC BLOOD PRESSURE: 82 MMHG | OXYGEN SATURATION: 98 %

## 2017-12-15 PROCEDURE — 3331090001 HH PPS REVENUE CREDIT

## 2017-12-15 PROCEDURE — G0157 HHC PT ASSISTANT EA 15: HCPCS

## 2017-12-15 PROCEDURE — A4406 PECTIN BASED OSTOMY PASTE: HCPCS

## 2017-12-15 PROCEDURE — 3331090002 HH PPS REVENUE DEBIT

## 2017-12-15 NOTE — TELEPHONE ENCOUNTER
Telephone call from nutritionist at home health that is following patient. Nutritionist stated patient with complaint of 30 pound weight gain since TPN was changed with less sodium. Stated patient with swelling in lower extremities which is affecting mobility. Stated patient in discussion with pain specialist for admission to rehab facility, possible next week. Also stated patient is also eating and drinking about mouth. Recommend switching TPN to every other day until next week when patient has follow up with specialist. Also requesting use of diuretic, recommend contact primary care provider for follow up for such medication. Patient has follow up in our office in early January 2018.

## 2017-12-16 PROCEDURE — 3331090002 HH PPS REVENUE DEBIT

## 2017-12-16 PROCEDURE — 3331090001 HH PPS REVENUE CREDIT

## 2017-12-17 PROCEDURE — 3331090002 HH PPS REVENUE DEBIT

## 2017-12-17 PROCEDURE — 3331090001 HH PPS REVENUE CREDIT

## 2017-12-18 ENCOUNTER — HOME CARE VISIT (OUTPATIENT)
Dept: SCHEDULING | Facility: HOME HEALTH | Age: 42
End: 2017-12-18
Payer: MEDICARE

## 2017-12-18 VITALS
OXYGEN SATURATION: 98 % | BODY MASS INDEX: 57.16 KG/M2 | DIASTOLIC BLOOD PRESSURE: 100 MMHG | RESPIRATION RATE: 20 BRPM | WEIGHT: 293 LBS | HEART RATE: 78 BPM | SYSTOLIC BLOOD PRESSURE: 140 MMHG | TEMPERATURE: 97.1 F

## 2017-12-18 VITALS
HEART RATE: 79 BPM | SYSTOLIC BLOOD PRESSURE: 114 MMHG | OXYGEN SATURATION: 98 % | BODY MASS INDEX: 57.16 KG/M2 | RESPIRATION RATE: 20 BRPM | TEMPERATURE: 97 F | WEIGHT: 293 LBS | DIASTOLIC BLOOD PRESSURE: 64 MMHG

## 2017-12-18 PROCEDURE — 3331090002 HH PPS REVENUE DEBIT

## 2017-12-18 PROCEDURE — G0152 HHCP-SERV OF OT,EA 15 MIN: HCPCS

## 2017-12-18 PROCEDURE — G0300 HHS/HOSPICE OF LPN EA 15 MIN: HCPCS

## 2017-12-18 PROCEDURE — 3331090001 HH PPS REVENUE CREDIT

## 2017-12-19 ENCOUNTER — HOME CARE VISIT (OUTPATIENT)
Dept: SCHEDULING | Facility: HOME HEALTH | Age: 42
End: 2017-12-19
Payer: MEDICARE

## 2017-12-19 VITALS
HEART RATE: 81 BPM | RESPIRATION RATE: 18 BRPM | OXYGEN SATURATION: 99 % | TEMPERATURE: 97.9 F | DIASTOLIC BLOOD PRESSURE: 92 MMHG | SYSTOLIC BLOOD PRESSURE: 121 MMHG

## 2017-12-19 PROCEDURE — 3331090001 HH PPS REVENUE CREDIT

## 2017-12-19 PROCEDURE — G0156 HHCP-SVS OF AIDE,EA 15 MIN: HCPCS

## 2017-12-19 PROCEDURE — 3331090002 HH PPS REVENUE DEBIT

## 2017-12-19 PROCEDURE — G0151 HHCP-SERV OF PT,EA 15 MIN: HCPCS

## 2017-12-20 ENCOUNTER — HOME CARE VISIT (OUTPATIENT)
Dept: SCHEDULING | Facility: HOME HEALTH | Age: 42
End: 2017-12-20
Payer: MEDICARE

## 2017-12-20 VITALS
DIASTOLIC BLOOD PRESSURE: 74 MMHG | SYSTOLIC BLOOD PRESSURE: 128 MMHG | TEMPERATURE: 98.3 F | OXYGEN SATURATION: 95 % | HEART RATE: 102 BPM | RESPIRATION RATE: 18 BRPM

## 2017-12-20 PROCEDURE — 3331090002 HH PPS REVENUE DEBIT

## 2017-12-20 PROCEDURE — A6216 NON-STERILE GAUZE<=16 SQ IN: HCPCS

## 2017-12-20 PROCEDURE — A4452 WATERPROOF TAPE: HCPCS

## 2017-12-20 PROCEDURE — A4406 PECTIN BASED OSTOMY PASTE: HCPCS

## 2017-12-20 PROCEDURE — A4333 URINARY CATH ANCHOR DEVICE: HCPCS

## 2017-12-20 PROCEDURE — G0299 HHS/HOSPICE OF RN EA 15 MIN: HCPCS

## 2017-12-20 PROCEDURE — A7000 DISPOSABLE CANISTER FOR PUMP: HCPCS

## 2017-12-20 PROCEDURE — A4357 BEDSIDE DRAINAGE BAG: HCPCS

## 2017-12-20 PROCEDURE — 3331090001 HH PPS REVENUE CREDIT

## 2017-12-20 PROCEDURE — A4385 OST SKN BARRIER SLD EXT WEAR: HCPCS

## 2017-12-21 PROCEDURE — 3331090001 HH PPS REVENUE CREDIT

## 2017-12-21 PROCEDURE — 3331090002 HH PPS REVENUE DEBIT

## 2017-12-22 ENCOUNTER — HOME CARE VISIT (OUTPATIENT)
Dept: SCHEDULING | Facility: HOME HEALTH | Age: 42
End: 2017-12-22
Payer: MEDICARE

## 2017-12-22 PROCEDURE — 3331090002 HH PPS REVENUE DEBIT

## 2017-12-22 PROCEDURE — 3331090001 HH PPS REVENUE CREDIT

## 2017-12-22 PROCEDURE — G0151 HHCP-SERV OF PT,EA 15 MIN: HCPCS

## 2017-12-22 PROCEDURE — G0156 HHCP-SVS OF AIDE,EA 15 MIN: HCPCS

## 2017-12-23 VITALS
SYSTOLIC BLOOD PRESSURE: 128 MMHG | OXYGEN SATURATION: 98 % | WEIGHT: 293 LBS | TEMPERATURE: 98.5 F | DIASTOLIC BLOOD PRESSURE: 70 MMHG | RESPIRATION RATE: 18 BRPM | HEART RATE: 78 BPM | BODY MASS INDEX: 55.1 KG/M2

## 2017-12-23 PROCEDURE — 3331090002 HH PPS REVENUE DEBIT

## 2017-12-23 PROCEDURE — 3331090001 HH PPS REVENUE CREDIT

## 2017-12-24 PROCEDURE — 3331090002 HH PPS REVENUE DEBIT

## 2017-12-24 PROCEDURE — 3331090001 HH PPS REVENUE CREDIT

## 2017-12-25 ENCOUNTER — HOME CARE VISIT (OUTPATIENT)
Dept: SCHEDULING | Facility: HOME HEALTH | Age: 42
End: 2017-12-25
Payer: MEDICARE

## 2017-12-25 VITALS
WEIGHT: 293 LBS | BODY MASS INDEX: 50.02 KG/M2 | RESPIRATION RATE: 20 BRPM | OXYGEN SATURATION: 97 % | DIASTOLIC BLOOD PRESSURE: 64 MMHG | HEIGHT: 64 IN | HEART RATE: 80 BPM | SYSTOLIC BLOOD PRESSURE: 128 MMHG | TEMPERATURE: 97.6 F

## 2017-12-25 PROCEDURE — 3331090003 HH PPS REVENUE ADJ

## 2017-12-25 PROCEDURE — 3331090001 HH PPS REVENUE CREDIT

## 2017-12-25 PROCEDURE — 3331090002 HH PPS REVENUE DEBIT

## 2017-12-26 PROCEDURE — 3331090002 HH PPS REVENUE DEBIT

## 2017-12-26 PROCEDURE — 3331090001 HH PPS REVENUE CREDIT

## 2017-12-27 ENCOUNTER — HOME CARE VISIT (OUTPATIENT)
Dept: SCHEDULING | Facility: HOME HEALTH | Age: 42
End: 2017-12-27
Payer: MEDICARE

## 2017-12-27 VITALS
TEMPERATURE: 97.6 F | SYSTOLIC BLOOD PRESSURE: 116 MMHG | RESPIRATION RATE: 18 BRPM | HEART RATE: 81 BPM | OXYGEN SATURATION: 97 % | DIASTOLIC BLOOD PRESSURE: 56 MMHG

## 2017-12-27 PROCEDURE — 3331090002 HH PPS REVENUE DEBIT

## 2017-12-27 PROCEDURE — 3331090001 HH PPS REVENUE CREDIT

## 2017-12-27 PROCEDURE — A9270 NON-COVERED ITEM OR SERVICE: HCPCS

## 2017-12-27 PROCEDURE — A4357 BEDSIDE DRAINAGE BAG: HCPCS

## 2017-12-27 PROCEDURE — A4406 PECTIN BASED OSTOMY PASTE: HCPCS

## 2017-12-27 PROCEDURE — A7000 DISPOSABLE CANISTER FOR PUMP: HCPCS

## 2017-12-27 PROCEDURE — G0299 HHS/HOSPICE OF RN EA 15 MIN: HCPCS

## 2017-12-27 PROCEDURE — A4628 OROPHARYNGEAL SUCTION CATH: HCPCS

## 2017-12-27 PROCEDURE — A6216 NON-STERILE GAUZE<=16 SQ IN: HCPCS

## 2017-12-27 PROCEDURE — 400014 HH F/U

## 2017-12-28 PROCEDURE — A4406 PECTIN BASED OSTOMY PASTE: HCPCS

## 2017-12-28 PROCEDURE — 3331090002 HH PPS REVENUE DEBIT

## 2017-12-28 PROCEDURE — 3331090001 HH PPS REVENUE CREDIT

## 2017-12-29 PROCEDURE — 3331090001 HH PPS REVENUE CREDIT

## 2017-12-29 PROCEDURE — 3331090002 HH PPS REVENUE DEBIT

## 2017-12-29 NOTE — PROGRESS NOTES
Reason for Visit:  Dehydration    Brief History:  39 yo woman with hx Crohn's disease s/p total colectomy with end ileostomy who subsequently developed bowel perforation with EC fistula presented with concerns for dehydration. Pt reported getting TPN at home. Sometimes pt has been dehydrated requiring homehealth bolus of saline. Pt denied any fever or chills. Pain is under control.     Visit Vitals    /70 (BP 1 Location: Left arm, BP Patient Position: Sitting)    Pulse (!) 118    Temp 97.9 °F (36.6 °C) (Oral)    Ht 5' 4\" (1.626 m)    Wt 292 lb 8 oz (132.7 kg)    BMI 50.21 kg/m2         Physical Exam:    Gen:  NAD  Pulm:  Unlabored  Abd:  S/ND/appropriate TTP  Abdominal wound:  3 EC fistula noted with pink granulating wound bed    AP: 39 yo woman with hx Crohn's disease presented for evaluation for dehydration    - Dehydration:  Will speak with home health to give 2 liters LR bolus  - continue local wound care  - Follow-up in January 2018 to discuss fistula takedown

## 2017-12-30 PROCEDURE — 3331090001 HH PPS REVENUE CREDIT

## 2017-12-30 PROCEDURE — 3331090002 HH PPS REVENUE DEBIT

## 2017-12-31 PROCEDURE — 3331090001 HH PPS REVENUE CREDIT

## 2017-12-31 PROCEDURE — 3331090002 HH PPS REVENUE DEBIT

## 2018-01-01 ENCOUNTER — HOME CARE VISIT (OUTPATIENT)
Dept: SCHEDULING | Facility: HOME HEALTH | Age: 43
End: 2018-01-01
Payer: MEDICARE

## 2018-01-01 VITALS
RESPIRATION RATE: 18 BRPM | DIASTOLIC BLOOD PRESSURE: 76 MMHG | TEMPERATURE: 97.6 F | BODY MASS INDEX: 52.87 KG/M2 | SYSTOLIC BLOOD PRESSURE: 122 MMHG | OXYGEN SATURATION: 98 % | WEIGHT: 293 LBS | HEART RATE: 70 BPM

## 2018-01-01 PROCEDURE — 3331090002 HH PPS REVENUE DEBIT

## 2018-01-01 PROCEDURE — 3331090001 HH PPS REVENUE CREDIT

## 2018-01-01 PROCEDURE — G0299 HHS/HOSPICE OF RN EA 15 MIN: HCPCS

## 2018-01-01 PROCEDURE — 400013 HH SOC

## 2018-01-01 PROCEDURE — 400014 HH F/U

## 2018-01-02 PROCEDURE — 3331090001 HH PPS REVENUE CREDIT

## 2018-01-02 PROCEDURE — 3331090002 HH PPS REVENUE DEBIT

## 2018-01-03 ENCOUNTER — HOME CARE VISIT (OUTPATIENT)
Dept: SCHEDULING | Facility: HOME HEALTH | Age: 43
End: 2018-01-03
Payer: MEDICARE

## 2018-01-03 PROCEDURE — A4369 SKIN BARRIER LIQUID PER OZ: HCPCS

## 2018-01-03 PROCEDURE — A4628 OROPHARYNGEAL SUCTION CATH: HCPCS

## 2018-01-03 PROCEDURE — 3331090002 HH PPS REVENUE DEBIT

## 2018-01-03 PROCEDURE — G0300 HHS/HOSPICE OF LPN EA 15 MIN: HCPCS

## 2018-01-03 PROCEDURE — 3331090001 HH PPS REVENUE CREDIT

## 2018-01-03 PROCEDURE — A4357 BEDSIDE DRAINAGE BAG: HCPCS

## 2018-01-04 PROCEDURE — 3331090002 HH PPS REVENUE DEBIT

## 2018-01-04 PROCEDURE — 3331090001 HH PPS REVENUE CREDIT

## 2018-01-05 PROCEDURE — 3331090001 HH PPS REVENUE CREDIT

## 2018-01-05 PROCEDURE — 3331090002 HH PPS REVENUE DEBIT

## 2018-01-06 PROCEDURE — 3331090001 HH PPS REVENUE CREDIT

## 2018-01-06 PROCEDURE — 3331090002 HH PPS REVENUE DEBIT

## 2018-01-07 PROCEDURE — 3331090002 HH PPS REVENUE DEBIT

## 2018-01-07 PROCEDURE — 3331090001 HH PPS REVENUE CREDIT

## 2018-01-08 ENCOUNTER — HOME CARE VISIT (OUTPATIENT)
Dept: SCHEDULING | Facility: HOME HEALTH | Age: 43
End: 2018-01-08
Payer: MEDICARE

## 2018-01-08 VITALS
WEIGHT: 293 LBS | DIASTOLIC BLOOD PRESSURE: 74 MMHG | RESPIRATION RATE: 18 BRPM | HEART RATE: 74 BPM | SYSTOLIC BLOOD PRESSURE: 111 MMHG | TEMPERATURE: 97.8 F | OXYGEN SATURATION: 98 % | BODY MASS INDEX: 51.63 KG/M2

## 2018-01-08 PROCEDURE — A4649 SURGICAL SUPPLIES: HCPCS

## 2018-01-08 PROCEDURE — G0300 HHS/HOSPICE OF LPN EA 15 MIN: HCPCS

## 2018-01-08 PROCEDURE — 3331090001 HH PPS REVENUE CREDIT

## 2018-01-08 PROCEDURE — 3331090002 HH PPS REVENUE DEBIT

## 2018-01-09 PROCEDURE — 3331090002 HH PPS REVENUE DEBIT

## 2018-01-09 PROCEDURE — 3331090001 HH PPS REVENUE CREDIT

## 2018-01-10 ENCOUNTER — HOME CARE VISIT (OUTPATIENT)
Dept: SCHEDULING | Facility: HOME HEALTH | Age: 43
End: 2018-01-10
Payer: MEDICARE

## 2018-01-10 VITALS
DIASTOLIC BLOOD PRESSURE: 56 MMHG | HEART RATE: 104 BPM | SYSTOLIC BLOOD PRESSURE: 116 MMHG | OXYGEN SATURATION: 98 % | RESPIRATION RATE: 18 BRPM | TEMPERATURE: 98.7 F

## 2018-01-10 PROCEDURE — A4628 OROPHARYNGEAL SUCTION CATH: HCPCS

## 2018-01-10 PROCEDURE — A7000 DISPOSABLE CANISTER FOR PUMP: HCPCS

## 2018-01-10 PROCEDURE — 3331090002 HH PPS REVENUE DEBIT

## 2018-01-10 PROCEDURE — A4357 BEDSIDE DRAINAGE BAG: HCPCS

## 2018-01-10 PROCEDURE — A9270 NON-COVERED ITEM OR SERVICE: HCPCS

## 2018-01-10 PROCEDURE — A4369 SKIN BARRIER LIQUID PER OZ: HCPCS

## 2018-01-10 PROCEDURE — A4333 URINARY CATH ANCHOR DEVICE: HCPCS

## 2018-01-10 PROCEDURE — G0299 HHS/HOSPICE OF RN EA 15 MIN: HCPCS

## 2018-01-10 PROCEDURE — 3331090001 HH PPS REVENUE CREDIT

## 2018-01-11 PROCEDURE — 3331090001 HH PPS REVENUE CREDIT

## 2018-01-11 PROCEDURE — 3331090002 HH PPS REVENUE DEBIT

## 2018-01-12 PROCEDURE — 3331090001 HH PPS REVENUE CREDIT

## 2018-01-12 PROCEDURE — 3331090002 HH PPS REVENUE DEBIT

## 2018-01-13 VITALS
SYSTOLIC BLOOD PRESSURE: 116 MMHG | RESPIRATION RATE: 18 BRPM | TEMPERATURE: 97.5 F | DIASTOLIC BLOOD PRESSURE: 72 MMHG | OXYGEN SATURATION: 98 % | BODY MASS INDEX: 55.03 KG/M2 | HEART RATE: 70 BPM | WEIGHT: 293 LBS

## 2018-01-13 PROCEDURE — 3331090001 HH PPS REVENUE CREDIT

## 2018-01-13 PROCEDURE — 3331090002 HH PPS REVENUE DEBIT

## 2018-01-14 PROCEDURE — 3331090002 HH PPS REVENUE DEBIT

## 2018-01-14 PROCEDURE — 3331090001 HH PPS REVENUE CREDIT

## 2018-01-15 ENCOUNTER — HOME CARE VISIT (OUTPATIENT)
Dept: SCHEDULING | Facility: HOME HEALTH | Age: 43
End: 2018-01-15
Payer: MEDICARE

## 2018-01-15 PROCEDURE — 3331090002 HH PPS REVENUE DEBIT

## 2018-01-15 PROCEDURE — 3331090001 HH PPS REVENUE CREDIT

## 2018-01-15 PROCEDURE — G0300 HHS/HOSPICE OF LPN EA 15 MIN: HCPCS

## 2018-01-16 PROCEDURE — 3331090002 HH PPS REVENUE DEBIT

## 2018-01-16 PROCEDURE — 3331090001 HH PPS REVENUE CREDIT

## 2018-01-17 ENCOUNTER — HOME CARE VISIT (OUTPATIENT)
Dept: SCHEDULING | Facility: HOME HEALTH | Age: 43
End: 2018-01-17
Payer: MEDICARE

## 2018-01-17 VITALS
DIASTOLIC BLOOD PRESSURE: 60 MMHG | RESPIRATION RATE: 18 BRPM | HEART RATE: 108 BPM | SYSTOLIC BLOOD PRESSURE: 118 MMHG | TEMPERATURE: 98.6 F | OXYGEN SATURATION: 97 %

## 2018-01-17 PROCEDURE — A4333 URINARY CATH ANCHOR DEVICE: HCPCS

## 2018-01-17 PROCEDURE — 3331090002 HH PPS REVENUE DEBIT

## 2018-01-17 PROCEDURE — A4406 PECTIN BASED OSTOMY PASTE: HCPCS

## 2018-01-17 PROCEDURE — A4357 BEDSIDE DRAINAGE BAG: HCPCS

## 2018-01-17 PROCEDURE — 3331090001 HH PPS REVENUE CREDIT

## 2018-01-17 PROCEDURE — G0299 HHS/HOSPICE OF RN EA 15 MIN: HCPCS

## 2018-01-17 PROCEDURE — A4385 OST SKN BARRIER SLD EXT WEAR: HCPCS

## 2018-01-18 VITALS
TEMPERATURE: 96.6 F | HEART RATE: 79 BPM | DIASTOLIC BLOOD PRESSURE: 86 MMHG | SYSTOLIC BLOOD PRESSURE: 111 MMHG | WEIGHT: 293 LBS | RESPIRATION RATE: 18 BRPM | BODY MASS INDEX: 51.25 KG/M2 | OXYGEN SATURATION: 98 %

## 2018-01-18 PROCEDURE — 3331090002 HH PPS REVENUE DEBIT

## 2018-01-18 PROCEDURE — 3331090001 HH PPS REVENUE CREDIT

## 2018-01-19 PROCEDURE — 3331090002 HH PPS REVENUE DEBIT

## 2018-01-19 PROCEDURE — 3331090001 HH PPS REVENUE CREDIT

## 2018-01-20 PROCEDURE — 3331090001 HH PPS REVENUE CREDIT

## 2018-01-20 PROCEDURE — 3331090002 HH PPS REVENUE DEBIT

## 2018-01-21 PROCEDURE — 3331090002 HH PPS REVENUE DEBIT

## 2018-01-21 PROCEDURE — 3331090001 HH PPS REVENUE CREDIT

## 2018-01-22 ENCOUNTER — HOME CARE VISIT (OUTPATIENT)
Dept: SCHEDULING | Facility: HOME HEALTH | Age: 43
End: 2018-01-22
Payer: MEDICARE

## 2018-01-22 VITALS
DIASTOLIC BLOOD PRESSURE: 64 MMHG | TEMPERATURE: 97.8 F | HEART RATE: 73 BPM | SYSTOLIC BLOOD PRESSURE: 108 MMHG | WEIGHT: 293 LBS | RESPIRATION RATE: 20 BRPM | BODY MASS INDEX: 50.88 KG/M2 | OXYGEN SATURATION: 98 %

## 2018-01-22 PROCEDURE — 3331090002 HH PPS REVENUE DEBIT

## 2018-01-22 PROCEDURE — 3331090001 HH PPS REVENUE CREDIT

## 2018-01-22 PROCEDURE — G0300 HHS/HOSPICE OF LPN EA 15 MIN: HCPCS

## 2018-01-23 ENCOUNTER — OFFICE VISIT (OUTPATIENT)
Dept: SURGERY | Age: 43
End: 2018-01-23

## 2018-01-23 VITALS
HEART RATE: 113 BPM | DIASTOLIC BLOOD PRESSURE: 70 MMHG | RESPIRATION RATE: 20 BRPM | TEMPERATURE: 98.4 F | SYSTOLIC BLOOD PRESSURE: 122 MMHG | OXYGEN SATURATION: 98 %

## 2018-01-23 DIAGNOSIS — K63.2 ENTEROCUTANEOUS FISTULA: Primary | ICD-10-CM

## 2018-01-23 PROCEDURE — 3331090002 HH PPS REVENUE DEBIT

## 2018-01-23 PROCEDURE — 3331090001 HH PPS REVENUE CREDIT

## 2018-01-23 NOTE — PROGRESS NOTES
Chief Complaint:  Enterocutaneous fistula    HPI:  37 yo woman with hx Crohn's disease s/p total abdominal colectomy with end ileostomy complicated by EC fistula after bowel perforation from enteroscopy. Pt has been getting TPN and is feeling better from dehydration standpoint. She is on chronic pain medication. No fever, chills, nausea or vomiting. Fistula has been control with dermasure appliance. Pt reported gaining approximately 20-30 pound from fluid retention recently but is now diuresing well. Past Medical History:   Diagnosis Date    Crohn's disease ([facility] [address] 8/15/2011    DVT (deep venous thrombosis) ([facility] [address] 8/15/2011    Incarcerated ventral hernia 8/15/2011    Right flank pain 8/22/2011    Seizures ([facility] [address]     Stroke [facility])        Past Surgical History:   Procedure Laterality Date    HX OTHER SURGICAL      multiple procedures related to her Crohn's disease    [place] LAP, INCISIONAL HERNIA REPAIR,INCARCERATED  9-10-08    dr. Guillermo Zuleta       Current Outpatient Prescriptions on File Prior to Visit   Medication Sig Dispense Refill    GUAIFENESIN/DEXTROMETHORPHAN (MUCINEX DM PO) Take 1 Tab by mouth three (3) times daily as needed (congestion). As needed      predniSONE (DELTASONE) 20 mg tablet Take 20 mg by mouth two (2) times a day. Patient is to alternate between 20mg 2 x daily and 20mg 3 x daily depending on Crohns symptoms of rectal discharge and abd pain      enoxaparin (LOVENOX) injection 150 mg by SubCUTAneous route daily.  VITAMIN B-12 5,000 mcg subl   1    sodium chloride (NORMAL SALINE FLUSH) 0.9 % 5-10 mL by IntraVENous route daily. [facility] protocol, before and after TPN      HEPARIN SOD,PORCINE/0.9 % NACL (HEPARIN FLUSH IV) 5 mL daily. To maintain patency      pantoprazole (PROTONIX) 40 mg tablet Take 1 Tab by mouth Daily (before breakfast). 30 Tab 0    clonazePAM (KLONOPIN) 0.5 mg tablet Take 1 Tab by mouth two (2) times a day. Max Daily Amount: 1 mg.  Indications: anxiety 20 Tab 0    fat emulsion 20% (LIPOSYN, INTRALIPID) 20 % infusion 500 mL by IntraVENous route every Monday, Wednesday, Friday. Indications: TPN 1000 mL 0    morphine CR (MS CONTIN) 100 mg CR tablet Take 1 Tab by mouth every eight (8) hours. Max Daily Amount: 300 mg. (Patient taking differently: Take 100 mg by mouth every eight (8) hours. Patient states she takes the 'ER' not the 'CR'-three times a day at 8 o'clock, 2 o'clock, and 10 o'clock) 5 Tab 0    morphine IR (MS IR) 30 mg tablet Take 1 Tab by mouth every three (3) hours. Max Daily Amount: 240 mg. 5 Tab 0    predniSONE (DELTASONE) 20 mg tablet Take 1 Tab by mouth two (2) times a day. Indications: Crohn's Disease (Patient taking differently: Take 1 Tab by mouth three (3) times daily. Indications: Crohn's Disease) 60 Tab 0    ondansetron (ZOFRAN ODT) 4 mg disintegrating tablet Take 1 Tab by mouth every eight (8) hours as needed for Nausea. 20 Tab 0    insulin lispro (HUMALOG) 100 unit/mL injection As instructed on discharge instructions (Patient taking differently: by SubCUTAneous route once. As instructed on discharge instructions, one hour into infusion and one hour after completion, sliding scale, 140-199, 2 units, 200-249, 3 units, 250-299 5 units, 300-349 7 units, 350 or greater, call MD.) 1 Vial 0    clopidogrel (PLAVIX) 75 mg tab Take 1 Tab by mouth daily. 30 Tab 0    cyanocobalamin (VITAMIN B12) 100 mcg tablet Take 100 mcg by mouth daily.  acetaminophen (TYLENOL) 325 mg tablet Take 650 mg by mouth every six (6) hours as needed for Pain.  albuterol (PROVENTIL VENTOLIN) 2.5 mg /3 mL (0.083 %) nebulizer solution 2.5 mg by Nebulization route every four (4) hours as needed for Wheezing.  fluticasone-vilanterol (BREO ELLIPTA) 100-25 mcg/dose inhaler Take 1 Puff by inhalation daily.  aspirin 81 mg chewable tablet Take 1 Tab by mouth daily. 30 Tab 0     No current facility-administered medications on file prior to visit.         Allergies   Allergen Reactions    Demerol [Meperidine] Unknown (comments)    Soma [Carisoprodol] Rash and Nausea Only    Sulfa (Sulfonamide Antibiotics) Unknown (comments)    Toradol [Ketorolac Tromethamine] Unknown (comments)       Review of Systems - General ROS: negative  Psychological ROS: negative  Respiratory ROS: negative  Cardiovascular ROS: negative  Gastrointestinal ROS: positive for - abdominal pain and EC fistula    Visit Vitals    /70 (BP 1 Location: Left arm, BP Patient Position: Sitting)    Pulse (!) 113    Temp 98.4 °F (36.9 °C) (Oral)    Resp 20    SpO2 98%         Physical Exam:    Gen:  NAD  Pulm:  Unlabored  Abd:  S/ND/appropriate TTP  Abdominal wound:  Granulation wound bed with midline abdomen with EC fistula and enteric output    No results found for this or any previous visit (from the past 24 hour(s)). AP: 37 yo woman with hx Crohn's complicated by EC fistula     - EC fistula: Will plan for fistula takedown. Tentative dates are February 8, 21 or 22.   -  Will need to obtain fistulogram to rule out bowel obstruction prior to operation  - Continue TPN for now  - Timing of operation to be determined by Fistulogram study and patient schedule

## 2018-01-23 NOTE — PROGRESS NOTES
1. Have you been to the ER, urgent care clinic since your last visit? Hospitalized since your last visit? No    2. Have you seen or consulted any other health care providers outside of the [facility] since your last visit? Include any pap smears or colon screening. No     Patient states she is her because she wants to discuss having surgery. Patient has her mother with her at todays appointment. Patient comes in wheelchair today.

## 2018-01-24 ENCOUNTER — HOME CARE VISIT (OUTPATIENT)
Dept: SCHEDULING | Facility: HOME HEALTH | Age: 43
End: 2018-01-24
Payer: MEDICARE

## 2018-01-24 VITALS
DIASTOLIC BLOOD PRESSURE: 68 MMHG | OXYGEN SATURATION: 98 % | TEMPERATURE: 97.7 F | SYSTOLIC BLOOD PRESSURE: 116 MMHG | HEART RATE: 94 BPM | RESPIRATION RATE: 18 BRPM

## 2018-01-24 PROCEDURE — 3331090001 HH PPS REVENUE CREDIT

## 2018-01-24 PROCEDURE — 3331090002 HH PPS REVENUE DEBIT

## 2018-01-24 PROCEDURE — A6216 NON-STERILE GAUZE<=16 SQ IN: HCPCS

## 2018-01-24 PROCEDURE — A4406 PECTIN BASED OSTOMY PASTE: HCPCS

## 2018-01-24 PROCEDURE — A4333 URINARY CATH ANCHOR DEVICE: HCPCS

## 2018-01-24 PROCEDURE — A4628 OROPHARYNGEAL SUCTION CATH: HCPCS

## 2018-01-24 PROCEDURE — A4357 BEDSIDE DRAINAGE BAG: HCPCS

## 2018-01-24 PROCEDURE — G0299 HHS/HOSPICE OF RN EA 15 MIN: HCPCS

## 2018-01-25 PROCEDURE — 3331090002 HH PPS REVENUE DEBIT

## 2018-01-25 PROCEDURE — 3331090001 HH PPS REVENUE CREDIT

## 2018-01-26 PROCEDURE — 3331090002 HH PPS REVENUE DEBIT

## 2018-01-26 PROCEDURE — 3331090001 HH PPS REVENUE CREDIT

## 2018-01-27 PROCEDURE — 3331090001 HH PPS REVENUE CREDIT

## 2018-01-27 PROCEDURE — 3331090002 HH PPS REVENUE DEBIT

## 2018-01-28 PROCEDURE — 3331090001 HH PPS REVENUE CREDIT

## 2018-01-28 PROCEDURE — 3331090002 HH PPS REVENUE DEBIT

## 2018-01-29 ENCOUNTER — HOME CARE VISIT (OUTPATIENT)
Dept: SCHEDULING | Facility: HOME HEALTH | Age: 43
End: 2018-01-29
Payer: MEDICARE

## 2018-01-29 VITALS
DIASTOLIC BLOOD PRESSURE: 78 MMHG | SYSTOLIC BLOOD PRESSURE: 112 MMHG | RESPIRATION RATE: 18 BRPM | TEMPERATURE: 97.1 F | HEART RATE: 60 BPM | WEIGHT: 293 LBS | OXYGEN SATURATION: 99 % | BODY MASS INDEX: 50.67 KG/M2

## 2018-01-29 PROCEDURE — G0300 HHS/HOSPICE OF LPN EA 15 MIN: HCPCS

## 2018-01-29 PROCEDURE — 3331090002 HH PPS REVENUE DEBIT

## 2018-01-29 PROCEDURE — 3331090001 HH PPS REVENUE CREDIT

## 2018-01-30 PROCEDURE — 3331090001 HH PPS REVENUE CREDIT

## 2018-01-30 PROCEDURE — 3331090002 HH PPS REVENUE DEBIT

## 2018-01-31 ENCOUNTER — HOME CARE VISIT (OUTPATIENT)
Dept: HOME HEALTH SERVICES | Facility: HOME HEALTH | Age: 43
End: 2018-01-31
Payer: MEDICARE

## 2018-01-31 ENCOUNTER — HOME CARE VISIT (OUTPATIENT)
Dept: SCHEDULING | Facility: HOME HEALTH | Age: 43
End: 2018-01-31
Payer: MEDICARE

## 2018-01-31 VITALS
DIASTOLIC BLOOD PRESSURE: 58 MMHG | OXYGEN SATURATION: 95 % | SYSTOLIC BLOOD PRESSURE: 104 MMHG | TEMPERATURE: 98.5 F | HEART RATE: 104 BPM | RESPIRATION RATE: 18 BRPM

## 2018-01-31 PROCEDURE — E0325 URINAL MALE JUG-TYPE: HCPCS

## 2018-01-31 PROCEDURE — 3331090002 HH PPS REVENUE DEBIT

## 2018-01-31 PROCEDURE — A4369 SKIN BARRIER LIQUID PER OZ: HCPCS

## 2018-01-31 PROCEDURE — A4333 URINARY CATH ANCHOR DEVICE: HCPCS

## 2018-01-31 PROCEDURE — A4406 PECTIN BASED OSTOMY PASTE: HCPCS

## 2018-01-31 PROCEDURE — 3331090001 HH PPS REVENUE CREDIT

## 2018-01-31 PROCEDURE — A4357 BEDSIDE DRAINAGE BAG: HCPCS

## 2018-01-31 PROCEDURE — A9270 NON-COVERED ITEM OR SERVICE: HCPCS

## 2018-01-31 PROCEDURE — G0299 HHS/HOSPICE OF RN EA 15 MIN: HCPCS

## 2018-02-01 PROCEDURE — 3331090002 HH PPS REVENUE DEBIT

## 2018-02-01 PROCEDURE — 3331090001 HH PPS REVENUE CREDIT

## 2018-02-02 PROCEDURE — 3331090001 HH PPS REVENUE CREDIT

## 2018-02-02 PROCEDURE — 3331090002 HH PPS REVENUE DEBIT

## 2018-02-03 PROCEDURE — 3331090001 HH PPS REVENUE CREDIT

## 2018-02-03 PROCEDURE — 3331090002 HH PPS REVENUE DEBIT

## 2018-02-04 PROCEDURE — 3331090001 HH PPS REVENUE CREDIT

## 2018-02-04 PROCEDURE — 3331090002 HH PPS REVENUE DEBIT

## 2018-02-05 ENCOUNTER — HOME CARE VISIT (OUTPATIENT)
Dept: SCHEDULING | Facility: HOME HEALTH | Age: 43
End: 2018-02-05
Payer: MEDICARE

## 2018-02-05 VITALS
TEMPERATURE: 97.1 F | HEART RATE: 86 BPM | OXYGEN SATURATION: 99 % | SYSTOLIC BLOOD PRESSURE: 110 MMHG | BODY MASS INDEX: 52.01 KG/M2 | DIASTOLIC BLOOD PRESSURE: 74 MMHG | WEIGHT: 293 LBS | RESPIRATION RATE: 18 BRPM

## 2018-02-05 PROCEDURE — 3331090002 HH PPS REVENUE DEBIT

## 2018-02-05 PROCEDURE — 3331090001 HH PPS REVENUE CREDIT

## 2018-02-05 PROCEDURE — G0300 HHS/HOSPICE OF LPN EA 15 MIN: HCPCS

## 2018-02-06 PROCEDURE — 3331090002 HH PPS REVENUE DEBIT

## 2018-02-06 PROCEDURE — 3331090001 HH PPS REVENUE CREDIT

## 2018-02-07 ENCOUNTER — HOME CARE VISIT (OUTPATIENT)
Dept: SCHEDULING | Facility: HOME HEALTH | Age: 43
End: 2018-02-07
Payer: MEDICARE

## 2018-02-07 VITALS
HEART RATE: 92 BPM | RESPIRATION RATE: 14 BRPM | TEMPERATURE: 97.3 F | DIASTOLIC BLOOD PRESSURE: 62 MMHG | SYSTOLIC BLOOD PRESSURE: 106 MMHG | OXYGEN SATURATION: 98 %

## 2018-02-07 PROCEDURE — A4385 OST SKN BARRIER SLD EXT WEAR: HCPCS

## 2018-02-07 PROCEDURE — A4452 WATERPROOF TAPE: HCPCS

## 2018-02-07 PROCEDURE — 3331090001 HH PPS REVENUE CREDIT

## 2018-02-07 PROCEDURE — A4406 PECTIN BASED OSTOMY PASTE: HCPCS

## 2018-02-07 PROCEDURE — 3331090002 HH PPS REVENUE DEBIT

## 2018-02-07 PROCEDURE — A4333 URINARY CATH ANCHOR DEVICE: HCPCS

## 2018-02-07 PROCEDURE — G0299 HHS/HOSPICE OF RN EA 15 MIN: HCPCS

## 2018-02-07 PROCEDURE — A6216 NON-STERILE GAUZE<=16 SQ IN: HCPCS

## 2018-02-07 PROCEDURE — A4357 BEDSIDE DRAINAGE BAG: HCPCS

## 2018-02-08 PROCEDURE — 3331090001 HH PPS REVENUE CREDIT

## 2018-02-08 PROCEDURE — 3331090002 HH PPS REVENUE DEBIT

## 2018-02-09 PROCEDURE — 3331090002 HH PPS REVENUE DEBIT

## 2018-02-09 PROCEDURE — 3331090001 HH PPS REVENUE CREDIT

## 2018-02-10 PROCEDURE — 3331090001 HH PPS REVENUE CREDIT

## 2018-02-10 PROCEDURE — 3331090002 HH PPS REVENUE DEBIT

## 2018-02-11 PROCEDURE — 3331090001 HH PPS REVENUE CREDIT

## 2018-02-11 PROCEDURE — 3331090002 HH PPS REVENUE DEBIT

## 2018-02-12 ENCOUNTER — HOME CARE VISIT (OUTPATIENT)
Dept: SCHEDULING | Facility: HOME HEALTH | Age: 43
End: 2018-02-12
Payer: MEDICARE

## 2018-02-12 PROCEDURE — 3331090001 HH PPS REVENUE CREDIT

## 2018-02-12 PROCEDURE — G0300 HHS/HOSPICE OF LPN EA 15 MIN: HCPCS

## 2018-02-12 PROCEDURE — 3331090002 HH PPS REVENUE DEBIT

## 2018-02-13 VITALS
OXYGEN SATURATION: 99 % | BODY MASS INDEX: 52.7 KG/M2 | WEIGHT: 293 LBS | SYSTOLIC BLOOD PRESSURE: 110 MMHG | TEMPERATURE: 97.4 F | HEART RATE: 104 BPM | RESPIRATION RATE: 18 BRPM | DIASTOLIC BLOOD PRESSURE: 70 MMHG

## 2018-02-13 PROCEDURE — 3331090001 HH PPS REVENUE CREDIT

## 2018-02-13 PROCEDURE — 3331090002 HH PPS REVENUE DEBIT

## 2018-02-14 ENCOUNTER — HOSPITAL ENCOUNTER (OUTPATIENT)
Dept: GENERAL RADIOLOGY | Age: 43
Discharge: HOME | End: 2018-02-14
Attending: SURGERY
Payer: MEDICARE

## 2018-02-14 DIAGNOSIS — K63.2 ENTEROCUTANEOUS FISTULA: ICD-10-CM

## 2018-02-14 PROCEDURE — 3331090002 HH PPS REVENUE DEBIT

## 2018-02-14 PROCEDURE — A4357 BEDSIDE DRAINAGE BAG: HCPCS

## 2018-02-14 PROCEDURE — 74011636320 HC RX REV CODE- 636/320: Performed by: RADIOLOGY

## 2018-02-14 PROCEDURE — A4385 OST SKN BARRIER SLD EXT WEAR: HCPCS

## 2018-02-14 PROCEDURE — 3331090001 HH PPS REVENUE CREDIT

## 2018-02-14 PROCEDURE — A4333 URINARY CATH ANCHOR DEVICE: HCPCS

## 2018-02-14 PROCEDURE — 76080 X-RAY EXAM OF FISTULA: CPT

## 2018-02-14 RX ADMIN — IOHEXOL 100 ML: 350 INJECTION, SOLUTION INTRAVENOUS at 12:21

## 2018-02-15 ENCOUNTER — HOME CARE VISIT (OUTPATIENT)
Dept: SCHEDULING | Facility: HOME HEALTH | Age: 43
End: 2018-02-15
Payer: MEDICARE

## 2018-02-15 ENCOUNTER — HOME CARE VISIT (OUTPATIENT)
Dept: HOME HEALTH SERVICES | Facility: HOME HEALTH | Age: 43
End: 2018-02-15
Payer: MEDICARE

## 2018-02-15 VITALS
RESPIRATION RATE: 18 BRPM | OXYGEN SATURATION: 97 % | HEART RATE: 98 BPM | SYSTOLIC BLOOD PRESSURE: 118 MMHG | DIASTOLIC BLOOD PRESSURE: 58 MMHG | TEMPERATURE: 98.4 F

## 2018-02-15 PROCEDURE — G0299 HHS/HOSPICE OF RN EA 15 MIN: HCPCS

## 2018-02-15 PROCEDURE — A4333 URINARY CATH ANCHOR DEVICE: HCPCS

## 2018-02-15 PROCEDURE — A4371 SKIN BARRIER POWDER PER OZ: HCPCS

## 2018-02-15 PROCEDURE — 3331090002 HH PPS REVENUE DEBIT

## 2018-02-15 PROCEDURE — 3331090001 HH PPS REVENUE CREDIT

## 2018-02-16 PROCEDURE — 3331090002 HH PPS REVENUE DEBIT

## 2018-02-16 PROCEDURE — 3331090001 HH PPS REVENUE CREDIT

## 2018-02-17 PROCEDURE — 3331090002 HH PPS REVENUE DEBIT

## 2018-02-17 PROCEDURE — 3331090001 HH PPS REVENUE CREDIT

## 2018-02-18 PROCEDURE — 3331090002 HH PPS REVENUE DEBIT

## 2018-02-18 PROCEDURE — 3331090001 HH PPS REVENUE CREDIT

## 2018-02-19 ENCOUNTER — TELEPHONE (OUTPATIENT)
Dept: SURGERY | Age: 43
End: 2018-02-19

## 2018-02-19 ENCOUNTER — HOME CARE VISIT (OUTPATIENT)
Dept: SCHEDULING | Facility: HOME HEALTH | Age: 43
End: 2018-02-19
Payer: MEDICARE

## 2018-02-19 PROCEDURE — 3331090002 HH PPS REVENUE DEBIT

## 2018-02-19 PROCEDURE — G0300 HHS/HOSPICE OF LPN EA 15 MIN: HCPCS

## 2018-02-19 PROCEDURE — 3331090001 HH PPS REVENUE CREDIT

## 2018-02-20 PROCEDURE — 3331090001 HH PPS REVENUE CREDIT

## 2018-02-20 PROCEDURE — 3331090002 HH PPS REVENUE DEBIT

## 2018-02-20 NOTE — TELEPHONE ENCOUNTER
I spoke with Ms. Palominophilippe and informed her that she will need to call office to schedule clinic appointment to see me to discuss operation. She plans to make appointment with me on Tuesday, February 27, 2018 to discuss operation.

## 2018-02-21 ENCOUNTER — HOME CARE VISIT (OUTPATIENT)
Dept: SCHEDULING | Facility: HOME HEALTH | Age: 43
End: 2018-02-21
Payer: MEDICARE

## 2018-02-21 VITALS
SYSTOLIC BLOOD PRESSURE: 116 MMHG | OXYGEN SATURATION: 98 % | DIASTOLIC BLOOD PRESSURE: 58 MMHG | TEMPERATURE: 97.9 F | HEART RATE: 102 BPM | RESPIRATION RATE: 18 BRPM

## 2018-02-21 PROCEDURE — 3331090001 HH PPS REVENUE CREDIT

## 2018-02-21 PROCEDURE — A6457 TUBULAR DRESSING: HCPCS

## 2018-02-21 PROCEDURE — A4333 URINARY CATH ANCHOR DEVICE: HCPCS

## 2018-02-21 PROCEDURE — A9270 NON-COVERED ITEM OR SERVICE: HCPCS

## 2018-02-21 PROCEDURE — A4406 PECTIN BASED OSTOMY PASTE: HCPCS

## 2018-02-21 PROCEDURE — G0299 HHS/HOSPICE OF RN EA 15 MIN: HCPCS

## 2018-02-21 PROCEDURE — 3331090002 HH PPS REVENUE DEBIT

## 2018-02-21 PROCEDURE — A4357 BEDSIDE DRAINAGE BAG: HCPCS

## 2018-02-21 PROCEDURE — A4385 OST SKN BARRIER SLD EXT WEAR: HCPCS

## 2018-02-21 PROCEDURE — A4628 OROPHARYNGEAL SUCTION CATH: HCPCS

## 2018-02-21 PROCEDURE — A7000 DISPOSABLE CANISTER FOR PUMP: HCPCS

## 2018-02-21 PROCEDURE — A6216 NON-STERILE GAUZE<=16 SQ IN: HCPCS

## 2018-02-22 PROCEDURE — 3331090002 HH PPS REVENUE DEBIT

## 2018-02-22 PROCEDURE — 3331090001 HH PPS REVENUE CREDIT

## 2018-02-23 VITALS
RESPIRATION RATE: 18 BRPM | DIASTOLIC BLOOD PRESSURE: 80 MMHG | HEART RATE: 92 BPM | SYSTOLIC BLOOD PRESSURE: 130 MMHG | OXYGEN SATURATION: 96 % | TEMPERATURE: 98.3 F

## 2018-02-23 PROCEDURE — 3331090002 HH PPS REVENUE DEBIT

## 2018-02-23 PROCEDURE — 3331090001 HH PPS REVENUE CREDIT

## 2018-02-23 PROCEDURE — 3331090003 HH PPS REVENUE ADJ

## 2018-02-24 PROCEDURE — 3331090001 HH PPS REVENUE CREDIT

## 2018-02-24 PROCEDURE — 3331090002 HH PPS REVENUE DEBIT

## 2018-02-25 PROCEDURE — 3331090001 HH PPS REVENUE CREDIT

## 2018-02-25 PROCEDURE — 3331090002 HH PPS REVENUE DEBIT

## 2018-02-26 ENCOUNTER — HOME CARE VISIT (OUTPATIENT)
Dept: SCHEDULING | Facility: HOME HEALTH | Age: 43
End: 2018-02-26
Payer: MEDICARE

## 2018-02-26 PROCEDURE — G0299 HHS/HOSPICE OF RN EA 15 MIN: HCPCS

## 2018-02-26 PROCEDURE — 3331090001 HH PPS REVENUE CREDIT

## 2018-02-26 PROCEDURE — 3331090002 HH PPS REVENUE DEBIT

## 2018-02-27 ENCOUNTER — HOSPITAL ENCOUNTER (OUTPATIENT)
Dept: PREADMISSION TESTING | Age: 43
Discharge: HOME | End: 2018-02-27

## 2018-02-27 ENCOUNTER — OFFICE VISIT (OUTPATIENT)
Dept: SURGERY | Age: 43
End: 2018-02-27

## 2018-02-27 VITALS
RESPIRATION RATE: 20 BRPM | HEART RATE: 103 BPM | HEIGHT: 64 IN | TEMPERATURE: 98.3 F | WEIGHT: 293 LBS | BODY MASS INDEX: 50.02 KG/M2 | SYSTOLIC BLOOD PRESSURE: 143 MMHG | DIASTOLIC BLOOD PRESSURE: 80 MMHG

## 2018-02-27 VITALS
SYSTOLIC BLOOD PRESSURE: 143 MMHG | OXYGEN SATURATION: 98 % | DIASTOLIC BLOOD PRESSURE: 80 MMHG | TEMPERATURE: 98.3 F | HEART RATE: 103 BPM

## 2018-02-27 DIAGNOSIS — K63.2 ENTEROCUTANEOUS FISTULA: Primary | ICD-10-CM

## 2018-02-27 PROCEDURE — 3331090001 HH PPS REVENUE CREDIT

## 2018-02-27 PROCEDURE — 3331090002 HH PPS REVENUE DEBIT

## 2018-02-27 RX ORDER — ASCORBIC ACID 250 MG
250 TABLET ORAL 2 TIMES DAILY
COMMUNITY
End: 2021-06-20

## 2018-02-27 RX ORDER — PROMETHAZINE HYDROCHLORIDE 25 MG/1
25 TABLET ORAL
COMMUNITY

## 2018-02-27 RX ORDER — ALBUTEROL SULFATE 90 UG/1
2 AEROSOL, METERED RESPIRATORY (INHALATION)
COMMUNITY
End: 2020-11-02

## 2018-02-27 NOTE — PROGRESS NOTES
1. Have you been to the ER, urgent care clinic since your last visit? Hospitalized since your last visit? No    2. Have you seen or consulted any other health care providers outside of the [address] [place] [address] since your last visit? Include any pap smears or colon screening. Yes, saw pain management doctor, Dr. Marc Trujillo on 2/7/18. Patient comes in today in wheelchair. I asked if she walks at home and she states she does, but states she is unable to walk to exam room and unable to stand on scale today, stating because her feet are swelling. Patient states she has left foot pain, level 5, from a fall she had at home on 2/16/18, while 'getting off the scale and her knee gave out'.

## 2018-02-28 ENCOUNTER — HOME CARE VISIT (OUTPATIENT)
Dept: SCHEDULING | Facility: HOME HEALTH | Age: 43
End: 2018-02-28
Payer: MEDICARE

## 2018-02-28 PROCEDURE — G0300 HHS/HOSPICE OF LPN EA 15 MIN: HCPCS

## 2018-02-28 PROCEDURE — 3331090002 HH PPS REVENUE DEBIT

## 2018-02-28 PROCEDURE — 3331090001 HH PPS REVENUE CREDIT

## 2018-03-01 VITALS
DIASTOLIC BLOOD PRESSURE: 70 MMHG | BODY MASS INDEX: 52.52 KG/M2 | RESPIRATION RATE: 18 BRPM | HEART RATE: 108 BPM | TEMPERATURE: 97.9 F | OXYGEN SATURATION: 99 % | SYSTOLIC BLOOD PRESSURE: 108 MMHG | WEIGHT: 293 LBS

## 2018-03-01 PROCEDURE — 3331090002 HH PPS REVENUE DEBIT

## 2018-03-01 PROCEDURE — 3331090001 HH PPS REVENUE CREDIT

## 2018-03-01 PROCEDURE — 3331090003 HH PPS REVENUE ADJ

## 2018-03-02 PROCEDURE — 3331090002 HH PPS REVENUE DEBIT

## 2018-03-02 PROCEDURE — 3331090001 HH PPS REVENUE CREDIT

## 2018-03-02 PROCEDURE — 400014 HH F/U

## 2018-03-03 PROCEDURE — 3331090001 HH PPS REVENUE CREDIT

## 2018-03-03 PROCEDURE — 3331090002 HH PPS REVENUE DEBIT

## 2018-03-04 PROCEDURE — 3331090001 HH PPS REVENUE CREDIT

## 2018-03-04 PROCEDURE — 3331090002 HH PPS REVENUE DEBIT

## 2018-03-05 ENCOUNTER — ANESTHESIA EVENT (OUTPATIENT)
Dept: SURGERY | Age: 43
DRG: 330 | End: 2018-03-05
Payer: MEDICARE

## 2018-03-05 ENCOUNTER — HOME CARE VISIT (OUTPATIENT)
Dept: SCHEDULING | Facility: HOME HEALTH | Age: 43
End: 2018-03-05
Payer: MEDICARE

## 2018-03-05 VITALS
DIASTOLIC BLOOD PRESSURE: 76 MMHG | RESPIRATION RATE: 18 BRPM | WEIGHT: 293 LBS | OXYGEN SATURATION: 98 % | HEART RATE: 88 BPM | TEMPERATURE: 97.8 F | SYSTOLIC BLOOD PRESSURE: 112 MMHG | BODY MASS INDEX: 52.22 KG/M2

## 2018-03-05 PROCEDURE — G0300 HHS/HOSPICE OF LPN EA 15 MIN: HCPCS

## 2018-03-05 PROCEDURE — 400014 HH F/U

## 2018-03-05 PROCEDURE — 3331090002 HH PPS REVENUE DEBIT

## 2018-03-05 PROCEDURE — 3331090001 HH PPS REVENUE CREDIT

## 2018-03-05 NOTE — PROGRESS NOTES
Chief Complaint:  Small bowel fistula    HPI:  37 yo woman with hx Crohn's disease who presented to clinical to discuss fistula takedown. Pt had total colectomy with end ileostomy a number of years back. Pt then developed a stricture and had capsule endoscopy performed at outside hospital which was then lodged in stricture site and had enteroscopy to remove capsule. She developed perforation during endoscopy so had to emergently go to OR for repair of perforation. Post-operatively she was slow to improved and required extended hospital stay. She was then discharged home and came back to ED with SMA thrombosis with concern for bowel ischemia. She underwent an exploratory operation and developed an EC fistula. She was placed and TPN and was discharged home with parenteral nutrition. She had a fistulogram study which did not show any evidence of bowel obstruction. Past Medical History:   Diagnosis Date    Adverse effect of anesthesia     WOKE DURING SURGERY    Arthritis     Blood clot in vein 2017    STOMACH    Crohn's disease ([address] 8/15/2011    DVT (deep venous thrombosis) ([address] 8/15/2011    LEFT LEG    Edema     GENERALIZED R/T TPN; WAIST DOWN    Incarcerated ventral hernia 8/15/2011    Lupus     Lyme disease     Psychiatric disorder     ANXIETY    Right flank pain 8/22/2011    Seizures ([address] 1990    LAST AT AGE 17    Stroke [facility]) 1990    age 17;  A WEEK POST OP, HAD SEIZURES AND STROKE, WAS IN COMA FOR A MONTH    Thyroid disease     HYPO-NO MEDS       Past Surgical History:   Procedure Laterality Date    HX APPENDECTOMY      HX GYN  2015    HIDRADENTIS LABIA    HX OTHER SURGICAL      multiple procedures related to her Crohn's disease    HX OTHER SURGICAL      ABDOMINAL SURGERY REMOVING COLON, 2/3 STOMACH, 1/3 SMALL INTESTINE    [place] LAP, INCISIONAL HERNIA REPAIR,INCARCERATED  9-10-08    dr. Chaparrita Jenkins       Current Outpatient Prescriptions on File Prior to Visit   Medication Sig Dispense Refill  enoxaparin (LOVENOX) injection 150 mg by SubCUTAneous route nightly.  VITAMIN B-12 5,000 mcg subl Take 5,000 mcg by mouth daily. 1    sodium chloride (NORMAL SALINE FLUSH) 0.9 % 5-10 mL by IntraVENous route daily. [facility] protocol, before and after TPN      HEPARIN SOD,PORCINE/0.9 % NACL (HEPARIN FLUSH IV) 5 mL daily. To maintain patency      pantoprazole (PROTONIX) 40 mg tablet Take 1 Tab by mouth Daily (before breakfast). 30 Tab 0    clonazePAM (KLONOPIN) 0.5 mg tablet Take 1 Tab by mouth two (2) times a day. Max Daily Amount: 1 mg. Indications: anxiety (Patient taking differently: Take 0.5 mg by mouth two (2) times daily as needed. Indications: anxiety) 20 Tab 0    fat emulsion 20% (LIPOSYN, INTRALIPID) 20 % infusion 500 mL by IntraVENous route every Monday, Wednesday, Friday. Indications: TPN (Patient taking differently: 500 mL by IntraVENous route every evening. CLEAR BAG, NO LIPIDS EVERY NIGHT EXCEPT ON Friday; Friday WITH LIPIDS  Indications: TPN) 1000 mL 0    morphine CR (MS CONTIN) 100 mg CR tablet Take 1 Tab by mouth every eight (8) hours. Max Daily Amount: 300 mg. (Patient taking differently: Take 100 mg by mouth every eight (8) hours. 8 o'clock, 2 o'clock, and 10 o'clock) 5 Tab 0    morphine IR (MS IR) 30 mg tablet Take 1 Tab by mouth every three (3) hours. Max Daily Amount: 240 mg. (Patient taking differently: Take 30 mg by mouth every four (4) hours as needed for Pain (11AM, 3 PM, 7PM, 11PM, 3 AM, 7 AM). ) 5 Tab 0    predniSONE (DELTASONE) 20 mg tablet Take 1 Tab by mouth two (2) times a day. Indications: Crohn's Disease (Patient taking differently: Take 10 mg by mouth four (4) times daily. 10 MG AT 7AM  10 MG AT NOON  10 MG AT 5PM  10 MG AT HS  Indications: Crohn's Disease) 60 Tab 0    ondansetron (ZOFRAN ODT) 4 mg disintegrating tablet Take 1 Tab by mouth every eight (8) hours as needed for Nausea.  (Patient taking differently: Take 8 mg by mouth every eight (8) hours as needed for Nausea.) 20 Tab 0    clopidogrel (PLAVIX) 75 mg tab Take 1 Tab by mouth daily. 30 Tab 0    cyanocobalamin (VITAMIN B12) 100 mcg tablet Take 100 mcg by mouth daily.  insulin lispro (HUMALOG) 100 unit/mL injection As instructed on discharge instructions (Patient taking differently: by SubCUTAneous route two (2) times a day. ~ 2 HRS AFTER START OF TPN CHECK BS: (THIS IS ALSO THE  SS SCHEDULE)  SS:  2 UNITS -249  3 UNITS -299  4 UNITS -349  IF GREATER THAN 350 CALL MD      ~1 HR AFTER TPN COMPLETION CHECK BS:  SS:  2 UNITS -199  3 UNITS -249  5 UNITS -299  7 UNITS -349  CALL MD FOR GREATER THAN 350) 1 Vial 0    acetaminophen (TYLENOL) 325 mg tablet Take 650 mg by mouth every six (6) hours as needed for Pain. No current facility-administered medications on file prior to visit. Allergies   Allergen Reactions    Sulfa (Sulfonamide Antibiotics) Anaphylaxis    Demerol [Meperidine] Rash    Soma [Carisoprodol] Rash and Nausea Only    Toradol [Ketorolac Tromethamine] Nausea and Vomiting       Review of Systems - General ROS: negative  Psychological ROS: negative  Respiratory ROS: negative  Cardiovascular ROS: negative  Gastrointestinal ROS: positive for - abdominal pain     Visit Vitals    /80 (BP Patient Position: Sitting)    Pulse (!) 103    Temp 98.3 °F (36.8 °C) (Oral)    SpO2 98%         Physical Exam:    Gen:  NAD  Pulm:  Unlabored  Abd:  S/ND/appropriate TTP  Wound: 5 x 8 cm opening in midline with fistula and enteric drainage    No results found for this or any previous visit (from the past 24 hour(s)).     AP: 39 yo woman with EC fistula presented for fistula takedown    - Fistula takedown:  Explained to patient that since she is on Plavix, will need to hold off on medication for 5 days prior to operation  - Pt will need to stop plavix on Saturday and Lovenox on Tuesday morning  - Risks and benefits explained to patient including a possible 6-10 hours of operation given severity of disease  - To OR on Wednesday, March 14, 2018 for fistula takedown

## 2018-03-06 ENCOUNTER — DOCUMENTATION ONLY (OUTPATIENT)
Dept: SURGERY | Age: 43
End: 2018-03-06

## 2018-03-06 PROCEDURE — 3331090001 HH PPS REVENUE CREDIT

## 2018-03-06 PROCEDURE — 3331090002 HH PPS REVENUE DEBIT

## 2018-03-06 NOTE — PROGRESS NOTES
Dr. Alberto Maya notified labs from [address] [place] [address] drawn on 3/5/18 were on his desk for review. He said he will review them in the morning prior to the patients surgery.

## 2018-03-07 ENCOUNTER — HOME CARE VISIT (OUTPATIENT)
Dept: HOME HEALTH SERVICES | Facility: HOME HEALTH | Age: 43
End: 2018-03-07
Payer: MEDICARE

## 2018-03-07 ENCOUNTER — ANESTHESIA (OUTPATIENT)
Dept: SURGERY | Age: 43
DRG: 330 | End: 2018-03-07
Payer: MEDICARE

## 2018-03-07 ENCOUNTER — APPOINTMENT (OUTPATIENT)
Dept: GENERAL RADIOLOGY | Age: 43
DRG: 330 | End: 2018-03-07
Attending: SURGERY
Payer: MEDICARE

## 2018-03-07 ENCOUNTER — HOSPITAL ENCOUNTER (INPATIENT)
Age: 43
LOS: 77 days | Discharge: HOME HEALTH | DRG: 330 | End: 2018-05-23
Attending: SURGERY | Admitting: SURGERY
Payer: MEDICARE

## 2018-03-07 DIAGNOSIS — R10.9 ACUTE POSTOPERATIVE PAIN OF ABDOMEN: ICD-10-CM

## 2018-03-07 DIAGNOSIS — R10.84 GENERALIZED ABDOMINAL PAIN: ICD-10-CM

## 2018-03-07 DIAGNOSIS — R11.0 NAUSEA: Primary | ICD-10-CM

## 2018-03-07 DIAGNOSIS — K63.2 SMALL BOWEL FISTULA: ICD-10-CM

## 2018-03-07 DIAGNOSIS — G89.29 CHRONIC LOW BACK PAIN, UNSPECIFIED BACK PAIN LATERALITY, WITH SCIATICA PRESENCE UNSPECIFIED: ICD-10-CM

## 2018-03-07 DIAGNOSIS — G89.4 CHRONIC PAIN SYNDROME: ICD-10-CM

## 2018-03-07 DIAGNOSIS — G89.18 ACUTE POSTOPERATIVE PAIN OF ABDOMEN: ICD-10-CM

## 2018-03-07 DIAGNOSIS — M54.50 CHRONIC BILATERAL LOW BACK PAIN WITHOUT SCIATICA: ICD-10-CM

## 2018-03-07 DIAGNOSIS — F41.9 ANXIETY: ICD-10-CM

## 2018-03-07 DIAGNOSIS — K55.069 SUPERIOR MESENTERIC ARTERY THROMBOSIS (HCC): ICD-10-CM

## 2018-03-07 DIAGNOSIS — G89.18 ACUTE POSTOPERATIVE PAIN: ICD-10-CM

## 2018-03-07 DIAGNOSIS — R10.0 ACUTE ABDOMEN: ICD-10-CM

## 2018-03-07 DIAGNOSIS — G89.18 POST-OPERATIVE PAIN: ICD-10-CM

## 2018-03-07 DIAGNOSIS — M54.5 CHRONIC LOW BACK PAIN, UNSPECIFIED BACK PAIN LATERALITY, WITH SCIATICA PRESENCE UNSPECIFIED: ICD-10-CM

## 2018-03-07 DIAGNOSIS — G89.18 ACUTE POST-OPERATIVE PAIN: ICD-10-CM

## 2018-03-07 DIAGNOSIS — R53.81 DEBILITY: ICD-10-CM

## 2018-03-07 DIAGNOSIS — G89.29 CHRONIC BILATERAL LOW BACK PAIN WITHOUT SCIATICA: ICD-10-CM

## 2018-03-07 LAB
GLUCOSE BLD STRIP.AUTO-MCNC: 96 MG/DL (ref 65–100)
HCG UR QL: NEGATIVE
SERVICE CMNT-IMP: NORMAL

## 2018-03-07 PROCEDURE — 77030022474 HC RELD STPLR ENDO GIA COVD -C: Performed by: SURGERY

## 2018-03-07 PROCEDURE — 82962 GLUCOSE BLOOD TEST: CPT

## 2018-03-07 PROCEDURE — 76010000139 HC OR TIME 5.5 TO 6 HR: Performed by: SURGERY

## 2018-03-07 PROCEDURE — 74011000250 HC RX REV CODE- 250: Performed by: SURGERY

## 2018-03-07 PROCEDURE — 77030032490 HC SLV COMPR SCD KNE COVD -B: Performed by: SURGERY

## 2018-03-07 PROCEDURE — 0DNW0ZZ RELEASE PERITONEUM, OPEN APPROACH: ICD-10-PCS | Performed by: SURGERY

## 2018-03-07 PROCEDURE — 77030018846 HC SOL IRR STRL H20 ICUM -A: Performed by: SURGERY

## 2018-03-07 PROCEDURE — 74011250636 HC RX REV CODE- 250/636: Performed by: ANESTHESIOLOGY

## 2018-03-07 PROCEDURE — 74018 RADEX ABDOMEN 1 VIEW: CPT

## 2018-03-07 PROCEDURE — 77030018836 HC SOL IRR NACL ICUM -A: Performed by: SURGERY

## 2018-03-07 PROCEDURE — P9045 ALBUMIN (HUMAN), 5%, 250 ML: HCPCS

## 2018-03-07 PROCEDURE — 74011000272 HC RX REV CODE- 272: Performed by: SURGERY

## 2018-03-07 PROCEDURE — 86900 BLOOD TYPING SEROLOGIC ABO: CPT | Performed by: SURGERY

## 2018-03-07 PROCEDURE — 76060000042 HC ANESTHESIA 5.5 TO 6 HR: Performed by: SURGERY

## 2018-03-07 PROCEDURE — C9113 INJ PANTOPRAZOLE SODIUM, VIA: HCPCS | Performed by: SURGERY

## 2018-03-07 PROCEDURE — 74011250636 HC RX REV CODE- 250/636

## 2018-03-07 PROCEDURE — 88305 TISSUE EXAM BY PATHOLOGIST: CPT | Performed by: SURGERY

## 2018-03-07 PROCEDURE — 3331090001 HH PPS REVENUE CREDIT

## 2018-03-07 PROCEDURE — 77030009965 HC RELD STPLR ENDOS COVD -D: Performed by: SURGERY

## 2018-03-07 PROCEDURE — 36415 COLL VENOUS BLD VENIPUNCTURE: CPT | Performed by: SURGERY

## 2018-03-07 PROCEDURE — 77030008771 HC TU NG SALEM SUMP -A: Performed by: ANESTHESIOLOGY

## 2018-03-07 PROCEDURE — 77030018315 HC SEAL FBRN TISSL10ML BAXT -F: Performed by: SURGERY

## 2018-03-07 PROCEDURE — 65660000000 HC RM CCU STEPDOWN

## 2018-03-07 PROCEDURE — 74011000258 HC RX REV CODE- 258: Performed by: SURGERY

## 2018-03-07 PROCEDURE — 77030010541: Performed by: SURGERY

## 2018-03-07 PROCEDURE — 86902 BLOOD TYPE ANTIGEN DONOR EA: CPT | Performed by: SURGERY

## 2018-03-07 PROCEDURE — 77030031139 HC SUT VCRL2 J&J -A: Performed by: SURGERY

## 2018-03-07 PROCEDURE — 77030011264 HC ELECTRD BLD EXT COVD -A: Performed by: SURGERY

## 2018-03-07 PROCEDURE — 77030002966 HC SUT PDS J&J -A: Performed by: SURGERY

## 2018-03-07 PROCEDURE — 77030002916 HC SUT ETHLN J&J -A: Performed by: SURGERY

## 2018-03-07 PROCEDURE — 74011250637 HC RX REV CODE- 250/637: Performed by: SURGERY

## 2018-03-07 PROCEDURE — 77030013079 HC BLNKT BAIR HGGR 3M -A: Performed by: ANESTHESIOLOGY

## 2018-03-07 PROCEDURE — 88304 TISSUE EXAM BY PATHOLOGIST: CPT | Performed by: SURGERY

## 2018-03-07 PROCEDURE — 86905 BLOOD TYPING RBC ANTIGENS: CPT | Performed by: SURGERY

## 2018-03-07 PROCEDURE — 77030022473 HC HNDL ENDO GIA UNIV USDA -C: Performed by: SURGERY

## 2018-03-07 PROCEDURE — 74011250636 HC RX REV CODE- 250/636: Performed by: SURGERY

## 2018-03-07 PROCEDURE — 76210000017 HC OR PH I REC 1.5 TO 2 HR: Performed by: SURGERY

## 2018-03-07 PROCEDURE — 3331090002 HH PPS REVENUE DEBIT

## 2018-03-07 PROCEDURE — 86920 COMPATIBILITY TEST SPIN: CPT | Performed by: SURGERY

## 2018-03-07 PROCEDURE — 86922 COMPATIBILITY TEST ANTIGLOB: CPT | Performed by: SURGERY

## 2018-03-07 PROCEDURE — 77030011278 HC ELECTRD LIG IMPT COVD -F: Performed by: SURGERY

## 2018-03-07 PROCEDURE — 77030018717 HC DRSG GRNUFM KCON -B: Performed by: SURGERY

## 2018-03-07 PROCEDURE — 81025 URINE PREGNANCY TEST: CPT

## 2018-03-07 PROCEDURE — 77030011640 HC PAD GRND REM COVD -A: Performed by: SURGERY

## 2018-03-07 PROCEDURE — 77030002996 HC SUT SLK J&J -A: Performed by: SURGERY

## 2018-03-07 PROCEDURE — 77030026438 HC STYL ET INTUB CARD -A: Performed by: ANESTHESIOLOGY

## 2018-03-07 PROCEDURE — 0DB80ZZ EXCISION OF SMALL INTESTINE, OPEN APPROACH: ICD-10-PCS | Performed by: SURGERY

## 2018-03-07 PROCEDURE — 86921 COMPATIBILITY TEST INCUBATE: CPT | Performed by: SURGERY

## 2018-03-07 PROCEDURE — 88311 DECALCIFY TISSUE: CPT | Performed by: SURGERY

## 2018-03-07 PROCEDURE — 74011000250 HC RX REV CODE- 250

## 2018-03-07 PROCEDURE — 77030008684 HC TU ET CUF COVD -B: Performed by: ANESTHESIOLOGY

## 2018-03-07 PROCEDURE — 3331090003 HH PPS REVENUE ADJ

## 2018-03-07 RX ORDER — ROPIVACAINE HYDROCHLORIDE 5 MG/ML
30 INJECTION, SOLUTION EPIDURAL; INFILTRATION; PERINEURAL AS NEEDED
Status: DISCONTINUED | OUTPATIENT
Start: 2018-03-07 | End: 2018-03-07 | Stop reason: HOSPADM

## 2018-03-07 RX ORDER — LIDOCAINE HYDROCHLORIDE 10 MG/ML
0.1 INJECTION, SOLUTION EPIDURAL; INFILTRATION; INTRACAUDAL; PERINEURAL AS NEEDED
Status: DISCONTINUED | OUTPATIENT
Start: 2018-03-07 | End: 2018-03-07 | Stop reason: HOSPADM

## 2018-03-07 RX ORDER — SODIUM CHLORIDE, SODIUM LACTATE, POTASSIUM CHLORIDE, CALCIUM CHLORIDE 600; 310; 30; 20 MG/100ML; MG/100ML; MG/100ML; MG/100ML
50 INJECTION, SOLUTION INTRAVENOUS CONTINUOUS
Status: DISCONTINUED | OUTPATIENT
Start: 2018-03-07 | End: 2018-03-16

## 2018-03-07 RX ORDER — SODIUM CHLORIDE, SODIUM LACTATE, POTASSIUM CHLORIDE, CALCIUM CHLORIDE 600; 310; 30; 20 MG/100ML; MG/100ML; MG/100ML; MG/100ML
INJECTION, SOLUTION INTRAVENOUS
Status: DISCONTINUED | OUTPATIENT
Start: 2018-03-07 | End: 2018-03-07 | Stop reason: HOSPADM

## 2018-03-07 RX ORDER — FENTANYL CITRATE 50 UG/ML
INJECTION, SOLUTION INTRAMUSCULAR; INTRAVENOUS AS NEEDED
Status: DISCONTINUED | OUTPATIENT
Start: 2018-03-07 | End: 2018-03-07 | Stop reason: HOSPADM

## 2018-03-07 RX ORDER — DEXAMETHASONE SODIUM PHOSPHATE 4 MG/ML
INJECTION, SOLUTION INTRA-ARTICULAR; INTRALESIONAL; INTRAMUSCULAR; INTRAVENOUS; SOFT TISSUE AS NEEDED
Status: DISCONTINUED | OUTPATIENT
Start: 2018-03-07 | End: 2018-03-07 | Stop reason: HOSPADM

## 2018-03-07 RX ORDER — SODIUM CHLORIDE 0.9 % (FLUSH) 0.9 %
5-10 SYRINGE (ML) INJECTION EVERY 8 HOURS
Status: DISCONTINUED | OUTPATIENT
Start: 2018-03-07 | End: 2018-03-07 | Stop reason: HOSPADM

## 2018-03-07 RX ORDER — HYDROMORPHONE HCL IN 0.9% NACL 15 MG/30ML
PATIENT CONTROLLED ANALGESIA VIAL INTRAVENOUS CONTINUOUS
Status: DISCONTINUED | OUTPATIENT
Start: 2018-03-07 | End: 2018-04-01

## 2018-03-07 RX ORDER — ENOXAPARIN SODIUM 150 MG/ML
150 INJECTION SUBCUTANEOUS EVERY 24 HOURS
Status: DISCONTINUED | OUTPATIENT
Start: 2018-03-08 | End: 2018-03-08

## 2018-03-07 RX ORDER — LORAZEPAM 2 MG/ML
1 INJECTION INTRAMUSCULAR
Status: DISCONTINUED | OUTPATIENT
Start: 2018-03-07 | End: 2018-05-23 | Stop reason: HOSPADM

## 2018-03-07 RX ORDER — ONDANSETRON 2 MG/ML
4 INJECTION INTRAMUSCULAR; INTRAVENOUS AS NEEDED
Status: DISCONTINUED | OUTPATIENT
Start: 2018-03-07 | End: 2018-03-07 | Stop reason: HOSPADM

## 2018-03-07 RX ORDER — SODIUM CHLORIDE 0.9 % (FLUSH) 0.9 %
5-10 SYRINGE (ML) INJECTION AS NEEDED
Status: DISCONTINUED | OUTPATIENT
Start: 2018-03-07 | End: 2018-03-07 | Stop reason: HOSPADM

## 2018-03-07 RX ORDER — ACETAMINOPHEN 10 MG/ML
1000 INJECTION, SOLUTION INTRAVENOUS EVERY 8 HOURS
Status: COMPLETED | OUTPATIENT
Start: 2018-03-07 | End: 2018-03-15

## 2018-03-07 RX ORDER — HYDROMORPHONE HYDROCHLORIDE 1 MG/ML
2 INJECTION, SOLUTION INTRAMUSCULAR; INTRAVENOUS; SUBCUTANEOUS
Status: DISCONTINUED | OUTPATIENT
Start: 2018-03-07 | End: 2018-03-12

## 2018-03-07 RX ORDER — LIDOCAINE HYDROCHLORIDE 20 MG/ML
INJECTION, SOLUTION EPIDURAL; INFILTRATION; INTRACAUDAL; PERINEURAL AS NEEDED
Status: DISCONTINUED | OUTPATIENT
Start: 2018-03-07 | End: 2018-03-07 | Stop reason: HOSPADM

## 2018-03-07 RX ORDER — GLYCOPYRROLATE 0.2 MG/ML
INJECTION INTRAMUSCULAR; INTRAVENOUS AS NEEDED
Status: DISCONTINUED | OUTPATIENT
Start: 2018-03-07 | End: 2018-03-07 | Stop reason: HOSPADM

## 2018-03-07 RX ORDER — DIPHENHYDRAMINE HYDROCHLORIDE 50 MG/ML
12.5 INJECTION, SOLUTION INTRAMUSCULAR; INTRAVENOUS AS NEEDED
Status: DISCONTINUED | OUTPATIENT
Start: 2018-03-07 | End: 2018-03-07 | Stop reason: HOSPADM

## 2018-03-07 RX ORDER — MIDAZOLAM HYDROCHLORIDE 1 MG/ML
0.5 INJECTION, SOLUTION INTRAMUSCULAR; INTRAVENOUS
Status: DISCONTINUED | OUTPATIENT
Start: 2018-03-07 | End: 2018-03-07 | Stop reason: HOSPADM

## 2018-03-07 RX ORDER — ALBUTEROL SULFATE 0.83 MG/ML
2.5 SOLUTION RESPIRATORY (INHALATION)
Status: DISCONTINUED | OUTPATIENT
Start: 2018-03-07 | End: 2018-05-23 | Stop reason: HOSPADM

## 2018-03-07 RX ORDER — ONDANSETRON 2 MG/ML
4 INJECTION INTRAMUSCULAR; INTRAVENOUS
Status: DISCONTINUED | OUTPATIENT
Start: 2018-03-07 | End: 2018-05-23 | Stop reason: HOSPADM

## 2018-03-07 RX ORDER — DEXMEDETOMIDINE HYDROCHLORIDE 4 UG/ML
INJECTION, SOLUTION INTRAVENOUS AS NEEDED
Status: DISCONTINUED | OUTPATIENT
Start: 2018-03-07 | End: 2018-03-07 | Stop reason: HOSPADM

## 2018-03-07 RX ORDER — SODIUM CHLORIDE, SODIUM LACTATE, POTASSIUM CHLORIDE, CALCIUM CHLORIDE 600; 310; 30; 20 MG/100ML; MG/100ML; MG/100ML; MG/100ML
100 INJECTION, SOLUTION INTRAVENOUS CONTINUOUS
Status: DISCONTINUED | OUTPATIENT
Start: 2018-03-07 | End: 2018-03-07 | Stop reason: HOSPADM

## 2018-03-07 RX ORDER — PANTOPRAZOLE SODIUM 40 MG/10ML
INJECTION, POWDER, LYOPHILIZED, FOR SOLUTION INTRAVENOUS
Status: DISPENSED
Start: 2018-03-07 | End: 2018-03-08

## 2018-03-07 RX ORDER — MIDAZOLAM HYDROCHLORIDE 1 MG/ML
INJECTION, SOLUTION INTRAMUSCULAR; INTRAVENOUS AS NEEDED
Status: DISCONTINUED | OUTPATIENT
Start: 2018-03-07 | End: 2018-03-07 | Stop reason: HOSPADM

## 2018-03-07 RX ORDER — PROPOFOL 10 MG/ML
INJECTION, EMULSION INTRAVENOUS AS NEEDED
Status: DISCONTINUED | OUTPATIENT
Start: 2018-03-07 | End: 2018-03-07 | Stop reason: HOSPADM

## 2018-03-07 RX ORDER — KETAMINE HYDROCHLORIDE 10 MG/ML
INJECTION, SOLUTION INTRAMUSCULAR; INTRAVENOUS AS NEEDED
Status: DISCONTINUED | OUTPATIENT
Start: 2018-03-07 | End: 2018-03-07 | Stop reason: HOSPADM

## 2018-03-07 RX ORDER — MIDAZOLAM HYDROCHLORIDE 1 MG/ML
INJECTION, SOLUTION INTRAMUSCULAR; INTRAVENOUS
Status: COMPLETED
Start: 2018-03-07 | End: 2018-03-07

## 2018-03-07 RX ORDER — MIDAZOLAM HYDROCHLORIDE 1 MG/ML
1 INJECTION, SOLUTION INTRAMUSCULAR; INTRAVENOUS AS NEEDED
Status: DISCONTINUED | OUTPATIENT
Start: 2018-03-07 | End: 2018-03-07 | Stop reason: HOSPADM

## 2018-03-07 RX ORDER — PHENYLEPHRINE HCL IN 0.9% NACL 0.4MG/10ML
SYRINGE (ML) INTRAVENOUS AS NEEDED
Status: DISCONTINUED | OUTPATIENT
Start: 2018-03-07 | End: 2018-03-07 | Stop reason: HOSPADM

## 2018-03-07 RX ORDER — ALBUMIN HUMAN 50 G/1000ML
SOLUTION INTRAVENOUS AS NEEDED
Status: DISCONTINUED | OUTPATIENT
Start: 2018-03-07 | End: 2018-03-07 | Stop reason: HOSPADM

## 2018-03-07 RX ORDER — SUCCINYLCHOLINE CHLORIDE 20 MG/ML
INJECTION INTRAMUSCULAR; INTRAVENOUS AS NEEDED
Status: DISCONTINUED | OUTPATIENT
Start: 2018-03-07 | End: 2018-03-07 | Stop reason: HOSPADM

## 2018-03-07 RX ORDER — HYDROMORPHONE HYDROCHLORIDE 2 MG/ML
INJECTION, SOLUTION INTRAMUSCULAR; INTRAVENOUS; SUBCUTANEOUS AS NEEDED
Status: DISCONTINUED | OUTPATIENT
Start: 2018-03-07 | End: 2018-03-07 | Stop reason: HOSPADM

## 2018-03-07 RX ORDER — ONDANSETRON 2 MG/ML
INJECTION INTRAMUSCULAR; INTRAVENOUS AS NEEDED
Status: DISCONTINUED | OUTPATIENT
Start: 2018-03-07 | End: 2018-03-07 | Stop reason: HOSPADM

## 2018-03-07 RX ORDER — ALBUTEROL SULFATE 90 UG/1
2 AEROSOL, METERED RESPIRATORY (INHALATION)
Status: DISCONTINUED | OUTPATIENT
Start: 2018-03-07 | End: 2018-03-07 | Stop reason: SDUPTHER

## 2018-03-07 RX ORDER — FENTANYL CITRATE 50 UG/ML
INJECTION, SOLUTION INTRAMUSCULAR; INTRAVENOUS
Status: COMPLETED
Start: 2018-03-07 | End: 2018-03-07

## 2018-03-07 RX ORDER — FENTANYL CITRATE 50 UG/ML
50 INJECTION, SOLUTION INTRAMUSCULAR; INTRAVENOUS AS NEEDED
Status: DISCONTINUED | OUTPATIENT
Start: 2018-03-07 | End: 2018-03-07 | Stop reason: HOSPADM

## 2018-03-07 RX ORDER — SODIUM CHLORIDE 9 MG/ML
25 INJECTION, SOLUTION INTRAVENOUS CONTINUOUS
Status: DISCONTINUED | OUTPATIENT
Start: 2018-03-07 | End: 2018-03-07 | Stop reason: HOSPADM

## 2018-03-07 RX ORDER — MORPHINE SULFATE 10 MG/ML
2 INJECTION, SOLUTION INTRAMUSCULAR; INTRAVENOUS
Status: DISCONTINUED | OUTPATIENT
Start: 2018-03-07 | End: 2018-03-07 | Stop reason: HOSPADM

## 2018-03-07 RX ORDER — FENTANYL CITRATE 50 UG/ML
25 INJECTION, SOLUTION INTRAMUSCULAR; INTRAVENOUS
Status: DISCONTINUED | OUTPATIENT
Start: 2018-03-07 | End: 2018-03-07 | Stop reason: HOSPADM

## 2018-03-07 RX ORDER — HYDROMORPHONE HYDROCHLORIDE 1 MG/ML
0.5 INJECTION, SOLUTION INTRAMUSCULAR; INTRAVENOUS; SUBCUTANEOUS
Status: DISCONTINUED | OUTPATIENT
Start: 2018-03-07 | End: 2018-03-07 | Stop reason: HOSPADM

## 2018-03-07 RX ORDER — NEOSTIGMINE METHYLSULFATE 1 MG/ML
INJECTION INTRAVENOUS AS NEEDED
Status: DISCONTINUED | OUTPATIENT
Start: 2018-03-07 | End: 2018-03-07 | Stop reason: HOSPADM

## 2018-03-07 RX ORDER — ROCURONIUM BROMIDE 10 MG/ML
INJECTION, SOLUTION INTRAVENOUS AS NEEDED
Status: DISCONTINUED | OUTPATIENT
Start: 2018-03-07 | End: 2018-03-07 | Stop reason: HOSPADM

## 2018-03-07 RX ADMIN — SODIUM CHLORIDE, SODIUM LACTATE, POTASSIUM CHLORIDE, AND CALCIUM CHLORIDE 100 ML/HR: 600; 310; 30; 20 INJECTION, SOLUTION INTRAVENOUS at 09:14

## 2018-03-07 RX ADMIN — ROCURONIUM BROMIDE 5 MG: 10 INJECTION, SOLUTION INTRAVENOUS at 10:24

## 2018-03-07 RX ADMIN — PROPOFOL 160 MG: 10 INJECTION, EMULSION INTRAVENOUS at 10:24

## 2018-03-07 RX ADMIN — ROCURONIUM BROMIDE 10 MG: 10 INJECTION, SOLUTION INTRAVENOUS at 11:13

## 2018-03-07 RX ADMIN — HYDROMORPHONE HYDROCHLORIDE 1 MG: 2 INJECTION, SOLUTION INTRAMUSCULAR; INTRAVENOUS; SUBCUTANEOUS at 16:10

## 2018-03-07 RX ADMIN — HYDROMORPHONE HYDROCHLORIDE 0.5 MG: 2 INJECTION, SOLUTION INTRAMUSCULAR; INTRAVENOUS; SUBCUTANEOUS at 15:58

## 2018-03-07 RX ADMIN — DEXAMETHASONE SODIUM PHOSPHATE 4 MG: 4 INJECTION, SOLUTION INTRA-ARTICULAR; INTRALESIONAL; INTRAMUSCULAR; INTRAVENOUS; SOFT TISSUE at 10:48

## 2018-03-07 RX ADMIN — NEOSTIGMINE METHYLSULFATE 3 MG: 1 INJECTION INTRAVENOUS at 15:36

## 2018-03-07 RX ADMIN — ROCURONIUM BROMIDE 35 MG: 10 INJECTION, SOLUTION INTRAVENOUS at 10:43

## 2018-03-07 RX ADMIN — GLYCOPYRROLATE 0.4 MG: 0.2 INJECTION INTRAMUSCULAR; INTRAVENOUS at 15:36

## 2018-03-07 RX ADMIN — DEXMEDETOMIDINE HYDROCHLORIDE 4 MCG: 4 INJECTION, SOLUTION INTRAVENOUS at 15:58

## 2018-03-07 RX ADMIN — SODIUM CHLORIDE, SODIUM LACTATE, POTASSIUM CHLORIDE, CALCIUM CHLORIDE: 600; 310; 30; 20 INJECTION, SOLUTION INTRAVENOUS at 10:15

## 2018-03-07 RX ADMIN — ONDANSETRON 4 MG: 2 INJECTION INTRAMUSCULAR; INTRAVENOUS at 15:40

## 2018-03-07 RX ADMIN — KETAMINE HYDROCHLORIDE 100 MG: 10 INJECTION, SOLUTION INTRAMUSCULAR; INTRAVENOUS at 10:25

## 2018-03-07 RX ADMIN — ACETAMINOPHEN 1000 MG: 10 INJECTION, SOLUTION INTRAVENOUS at 16:21

## 2018-03-07 RX ADMIN — SODIUM CHLORIDE, SODIUM LACTATE, POTASSIUM CHLORIDE, AND CALCIUM CHLORIDE 125 ML/HR: 600; 310; 30; 20 INJECTION, SOLUTION INTRAVENOUS at 17:45

## 2018-03-07 RX ADMIN — PANTOPRAZOLE SODIUM 40 MG: 40 INJECTION, POWDER, FOR SOLUTION INTRAVENOUS at 16:31

## 2018-03-07 RX ADMIN — SODIUM CHLORIDE, SODIUM LACTATE, POTASSIUM CHLORIDE, CALCIUM CHLORIDE: 600; 310; 30; 20 INJECTION, SOLUTION INTRAVENOUS at 12:52

## 2018-03-07 RX ADMIN — Medication: at 16:47

## 2018-03-07 RX ADMIN — FENTANYL CITRATE 25 MCG: 50 INJECTION, SOLUTION INTRAMUSCULAR; INTRAVENOUS at 16:15

## 2018-03-07 RX ADMIN — Medication 80 MCG: at 11:25

## 2018-03-07 RX ADMIN — ROCURONIUM BROMIDE 20 MG: 10 INJECTION, SOLUTION INTRAVENOUS at 14:43

## 2018-03-07 RX ADMIN — MIDAZOLAM HYDROCHLORIDE 0.5 MG: 1 INJECTION, SOLUTION INTRAMUSCULAR; INTRAVENOUS at 16:22

## 2018-03-07 RX ADMIN — MIDAZOLAM 0.5 MG: 1 INJECTION INTRAMUSCULAR; INTRAVENOUS at 16:22

## 2018-03-07 RX ADMIN — Medication 1 SPRAY: at 20:17

## 2018-03-07 RX ADMIN — ONDANSETRON HYDROCHLORIDE 4 MG: 2 INJECTION INTRAMUSCULAR; INTRAVENOUS at 20:17

## 2018-03-07 RX ADMIN — ROCURONIUM BROMIDE 20 MG: 10 INJECTION, SOLUTION INTRAVENOUS at 11:30

## 2018-03-07 RX ADMIN — FENTANYL CITRATE 50 MCG: 50 INJECTION, SOLUTION INTRAMUSCULAR; INTRAVENOUS at 15:27

## 2018-03-07 RX ADMIN — LIDOCAINE HYDROCHLORIDE 100 MG: 20 INJECTION, SOLUTION EPIDURAL; INFILTRATION; INTRACAUDAL; PERINEURAL at 10:24

## 2018-03-07 RX ADMIN — FENTANYL CITRATE 100 MCG: 50 INJECTION, SOLUTION INTRAMUSCULAR; INTRAVENOUS at 10:24

## 2018-03-07 RX ADMIN — SUCCINYLCHOLINE CHLORIDE 120 MG: 20 INJECTION INTRAMUSCULAR; INTRAVENOUS at 10:24

## 2018-03-07 RX ADMIN — GLYCOPYRROLATE 0.2 MG: 0.2 INJECTION INTRAMUSCULAR; INTRAVENOUS at 15:37

## 2018-03-07 RX ADMIN — NEOSTIGMINE METHYLSULFATE 2 MG: 1 INJECTION INTRAVENOUS at 15:37

## 2018-03-07 RX ADMIN — FENTANYL CITRATE 25 MCG: 50 INJECTION, SOLUTION INTRAMUSCULAR; INTRAVENOUS at 16:10

## 2018-03-07 RX ADMIN — ONDANSETRON HYDROCHLORIDE 4 MG: 2 INJECTION INTRAMUSCULAR; INTRAVENOUS at 23:11

## 2018-03-07 RX ADMIN — ALBUMIN HUMAN 250 ML: 50 SOLUTION INTRAVENOUS at 11:59

## 2018-03-07 RX ADMIN — MIDAZOLAM HYDROCHLORIDE 0.5 MG: 1 INJECTION, SOLUTION INTRAMUSCULAR; INTRAVENOUS at 16:15

## 2018-03-07 RX ADMIN — SODIUM CHLORIDE, SODIUM LACTATE, POTASSIUM CHLORIDE, CALCIUM CHLORIDE: 600; 310; 30; 20 INJECTION, SOLUTION INTRAVENOUS at 15:38

## 2018-03-07 RX ADMIN — HYDROMORPHONE HYDROCHLORIDE 0.5 MG: 2 INJECTION, SOLUTION INTRAMUSCULAR; INTRAVENOUS; SUBCUTANEOUS at 16:02

## 2018-03-07 RX ADMIN — ROCURONIUM BROMIDE 20 MG: 10 INJECTION, SOLUTION INTRAVENOUS at 12:10

## 2018-03-07 RX ADMIN — MIDAZOLAM HYDROCHLORIDE 2 MG: 1 INJECTION, SOLUTION INTRAMUSCULAR; INTRAVENOUS at 10:15

## 2018-03-07 RX ADMIN — HYDROMORPHONE HYDROCHLORIDE 1 MG: 2 INJECTION, SOLUTION INTRAMUSCULAR; INTRAVENOUS; SUBCUTANEOUS at 11:36

## 2018-03-07 RX ADMIN — HYDROMORPHONE HYDROCHLORIDE 1 MG: 2 INJECTION, SOLUTION INTRAMUSCULAR; INTRAVENOUS; SUBCUTANEOUS at 15:17

## 2018-03-07 RX ADMIN — PIPERACILLIN AND TAZOBACTAM 10.12 G: 3; .375 INJECTION, POWDER, FOR SOLUTION INTRAVENOUS at 17:13

## 2018-03-07 RX ADMIN — FENTANYL CITRATE 100 MCG: 50 INJECTION, SOLUTION INTRAMUSCULAR; INTRAVENOUS at 11:15

## 2018-03-07 RX ADMIN — DEXMEDETOMIDINE HYDROCHLORIDE 25 MCG: 4 INJECTION, SOLUTION INTRAVENOUS at 15:02

## 2018-03-07 RX ADMIN — PROMETHAZINE HYDROCHLORIDE 12.5 MG: 25 INJECTION INTRAMUSCULAR; INTRAVENOUS at 16:40

## 2018-03-07 RX ADMIN — ROCURONIUM BROMIDE 10 MG: 10 INJECTION, SOLUTION INTRAVENOUS at 12:49

## 2018-03-07 RX ADMIN — Medication 80 MCG: at 11:29

## 2018-03-07 NOTE — PERIOP NOTES
Called to the Surgical Waiting area and spoke with the patient's mother. Gave her an update that we were progressing and would call again in a couple of hours with another update per Dr Jacqueline Chen.

## 2018-03-07 NOTE — PERIOP NOTES
TRANSFER - OUT REPORT:    Verbal report given to MAURI Flores(name) on Maria De Jesus Hensley  being transferred to [facility](unit) for routine post - op       Report consisted of patients Situation, Background, Assessment and   Recommendations(SBAR). Time Pre op antibiotic given:1036  Anesthesia Stop time: 4874    Information from the following report(s) SBAR, OR Summary, Intake/Output, MAR, Recent Results and Cardiac Rhythm NSR. was reviewed with the receiving nurse. Opportunity for questions and clarification was provided. Is the patient on 02? YES       L/Min 2    Is the patient on a monitor? YES    Is the nurse transporting with the patient? YES    Surgical Waiting Area notified of patient's transfer from PACU? YES      The following personal items collected during your admission accompanied patient upon transfer:   Dental Appliance: Dental Appliances: None  Vision: Visual Aid: Glasses (sent to pacu)  Hearing Aid:    Jewelry: Jewelry: None  Clothing: Clothing:  (2 plastic bags of clothing placed on lower frame of pt's bed)  Other Valuables:  Other Valuables: None  Valuables sent to safe:

## 2018-03-07 NOTE — ANESTHESIA PREPROCEDURE EVALUATION
Anesthetic History   No history of anesthetic complications            Review of Systems / Medical History  Patient summary reviewed, nursing notes reviewed and pertinent labs reviewed    Pulmonary  Within defined limits                 Neuro/Psych     seizures  CVA  TIA and psychiatric history     Cardiovascular  Within defined limits                Exercise tolerance: >4 METS     GI/Hepatic/Renal  Within defined limits              Endo/Other      Hypothyroidism  Morbid obesity and arthritis     Other Findings            Physical Exam    Airway  Mallampati: III  TM Distance: 4 - 6 cm  Neck ROM: normal range of motion, short neck   Mouth opening: Normal     Cardiovascular  Regular rate and rhythm,  S1 and S2 normal,  no murmur, click, rub, or gallop             Dental  No notable dental hx       Pulmonary  Breath sounds clear to auscultation               Abdominal  GI exam deferred       Other Findings            Anesthetic Plan    ASA: 3  Anesthesia type: general          Induction: Intravenous  Anesthetic plan and risks discussed with: Patient      c mac

## 2018-03-07 NOTE — PERIOP NOTES
Called to the Surgical Waiting and spoke with the patient's mother to let them know that we had started the procedure at 1115 am.  Also that we would update them as needed.

## 2018-03-07 NOTE — PERIOP NOTES
Tisseel 10ml given to sterile field for use during surgical procedure.  Lot: [id number]  Exp: 9/30/2019

## 2018-03-07 NOTE — PERIOP NOTES
16 fr woodruff with 10 ml balloon inserted by YG Portillo RN prior to the start of the procedure per Dr. Avinash Jacobson  orders. Draining clear yellow urine.

## 2018-03-07 NOTE — BRIEF OP NOTE
BRIEF OPERATIVE NOTE    Date of Procedure: 3/7/2018   Preoperative Diagnosis: FISTULA  Postoperative Diagnosis: FISTULA    Procedure(s):  EXPLORATORY LAPAROTOMY, LYSIS OF ADHESION X 4 HOURS, SMALL BOWEL FISTULA TAKE DOWN X 2 AND WOUND VAC PLACEMENT  Surgeon(s) and Role:     * Margi Connors MD - Primary         Assistant Staff: None      Surgical Staff:  Circ-1: Victor Hugo Wheeler RN  Circ-Relief: Juve Hernandez RN; Tez Major RN  Scrub Tech-1: Johnson Barrios  Scrub RN-Relief: Juve Hernandez RN; Rasheed Ramos RN  Surg Asst-1: Diamond Ramirez RN  Surg Asst-Relief: Imani Perea  Event Time In   Incision Start 1115   Incision Close 1545     Anesthesia: General   Estimated Blood Loss: Minimal  Specimens:   ID Type Source Tests Collected by Time Destination   1 : Proximal Small Bowel Fistula Fresh Tissue  Margi Connors MD 3/7/2018 1335 Pathology   2 : Distal Small Bowel Fistula Fresh Tissue  Margi Connors MD 3/7/2018 1345 Pathology   3 : ABDOMINAL WALL IMPLANT Fresh Abdomen  Margi Connors MD 3/7/2018 1506 Pathology      Findings: There was dense adhesions throughout the abdomen and pelvis. There were two small bowel fistulae. The poximal fistula was approximately 40 cm from ligament of trietze. Unclear how far down stream second fistula is. Both fistula were taken down and repaired primarily in two layer handsewn fashion. Complications: None  Implants: * No implants in log *  Drain:  19 [address] Haider drain placed in pelvis and externalized to LLQ.

## 2018-03-07 NOTE — H&P
Chief Complaint:  Small bowel fistula     HPI:  37 yo woman with hx Crohn's disease who presented to clinical to discuss fistula takedown. Pt had total colectomy with end ileostomy a number of years back. Pt then developed a stricture and had capsule endoscopy performed at outside hospital which was then lodged in stricture site and had enteroscopy to remove capsule. She developed perforation during endoscopy so had to emergently go to OR for repair of perforation. Post-operatively she was slow to improved and required extended hospital stay. She was then discharged home and came back to ED with SMA thrombosis with concern for bowel ischemia. She underwent an exploratory operation and developed an EC fistula. She was placed and TPN and was discharged home with parenteral nutrition. She had a fistulogram study which did not show any evidence of bowel obstruction.          Past Medical History:   Diagnosis Date    Adverse effect of anesthesia       WOKE DURING SURGERY    Arthritis      Blood clot in vein 2017     STOMACH    Crohn's disease ([address] 8/15/2011    DVT (deep venous thrombosis) ([address] 8/15/2011     LEFT LEG    Edema       GENERALIZED R/T TPN; WAIST DOWN    Incarcerated ventral hernia 8/15/2011    Lupus      Lyme disease      Psychiatric disorder       ANXIETY    Right flank pain 8/22/2011    Seizures ([address] 1990     LAST AT AGE 15    Stroke ([address] 200     age 17;  A WEEK POST OP, HAD SEIZURES AND STROKE, WAS IN COMA FOR A MONTH    Thyroid disease       HYPO-NO MEDS               Past Surgical History:   Procedure Laterality Date    HX APPENDECTOMY        HX GYN   2015     HIDRADENTIS LABIA    HX OTHER SURGICAL         multiple procedures related to her Crohn's disease    HX OTHER SURGICAL         ABDOMINAL SURGERY REMOVING COLON, 2/3 STOMACH, 1/3 SMALL INTESTINE    [place] LAP, INCISIONAL HERNIA REPAIR,INCARCERATED   9-10-08     dr. Eleanor Hashimoto                Current Outpatient Prescriptions on File Prior to Visit   Medication Sig Dispense Refill    enoxaparin (LOVENOX) injection 150 mg by SubCUTAneous route nightly.        VITAMIN B-12 5,000 mcg subl Take 5,000 mcg by mouth daily.   1    sodium chloride (NORMAL SALINE FLUSH) 0.9 % 5-10 mL by IntraVENous route daily. [facility] protocol, before and after TPN        HEPARIN SOD,PORCINE/0.9 % NACL (HEPARIN FLUSH IV) 5 mL daily. To maintain patency        pantoprazole (PROTONIX) 40 mg tablet Take 1 Tab by mouth Daily (before breakfast). 30 Tab 0    clonazePAM (KLONOPIN) 0.5 mg tablet Take 1 Tab by mouth two (2) times a day. Max Daily Amount: 1 mg. Indications: anxiety (Patient taking differently: Take 0.5 mg by mouth two (2) times daily as needed. Indications: anxiety) 20 Tab 0    fat emulsion 20% (LIPOSYN, INTRALIPID) 20 % infusion 500 mL by IntraVENous route every Monday, Wednesday, Friday. Indications: TPN (Patient taking differently: 500 mL by IntraVENous route every evening. CLEAR BAG, NO LIPIDS EVERY NIGHT EXCEPT ON Friday; Friday WITH LIPIDS  Indications: TPN) 1000 mL 0    morphine CR (MS CONTIN) 100 mg CR tablet Take 1 Tab by mouth every eight (8) hours. Max Daily Amount: 300 mg. (Patient taking differently: Take 100 mg by mouth every eight (8) hours. 8 o'clock, 2 o'clock, and 10 o'clock) 5 Tab 0    morphine IR (MS IR) 30 mg tablet Take 1 Tab by mouth every three (3) hours. Max Daily Amount: 240 mg. (Patient taking differently: Take 30 mg by mouth every four (4) hours as needed for Pain (11AM, 3 PM, 7PM, 11PM, 3 AM, 7 AM). ) 5 Tab 0    predniSONE (DELTASONE) 20 mg tablet Take 1 Tab by mouth two (2) times a day. Indications: Crohn's Disease (Patient taking differently: Take 10 mg by mouth four (4) times daily. 10 MG AT 7AM  10 MG AT NOON  10 MG AT 5PM  10 MG AT HS  Indications: Crohn's Disease) 60 Tab 0    ondansetron (ZOFRAN ODT) 4 mg disintegrating tablet Take 1 Tab by mouth every eight (8) hours as needed for Nausea.  (Patient taking differently: Take 8 mg by mouth every eight (8) hours as needed for Nausea.) 20 Tab 0    clopidogrel (PLAVIX) 75 mg tab Take 1 Tab by mouth daily. 30 Tab 0    cyanocobalamin (VITAMIN B12) 100 mcg tablet Take 100 mcg by mouth daily.        insulin lispro (HUMALOG) 100 unit/mL injection As instructed on discharge instructions (Patient taking differently: by SubCUTAneous route two (2) times a day.  ~ 2 HRS AFTER START OF TPN CHECK BS: (THIS IS ALSO THE  SS SCHEDULE)  SS:  2 UNITS -249  3 UNITS -299  4 UNITS -349  IF GREATER THAN 350 CALL MD        ~1 HR AFTER TPN COMPLETION CHECK BS:  SS:  2 UNITS -199  3 UNITS -249  5 UNITS -299  7 UNITS -349  CALL MD FOR GREATER THAN 350) 1 Vial 0    acetaminophen (TYLENOL) 325 mg tablet Take 650 mg by mouth every six (6) hours as needed for Pain.          No current facility-administered medications on file prior to visit.               Allergies   Allergen Reactions    Sulfa (Sulfonamide Antibiotics) Anaphylaxis    Demerol [Meperidine] Rash    Soma [Carisoprodol] Rash and Nausea Only    Toradol [Ketorolac Tromethamine] Nausea and Vomiting         Review of Systems - General ROS: negative  Psychological ROS: negative  Respiratory ROS: negative  Cardiovascular ROS: negative  Gastrointestinal ROS: positive for - abdominal pain        Visit Vitals    /76 (BP 1 Location: Left arm, BP Patient Position: At rest)    Pulse (!) 116    Temp 98.6 °F (37 °C)    Resp 20    Ht 5' 4\" (1.626 m)    Wt 302 lb (137 kg)    SpO2 100%    BMI 51.84 kg/m2          Physical Exam:    Gen:  NAD  Pulm:  Unlabored  Abd:  S/ND/appropriate TTP  Wound: 5 x 8 cm opening in midline with fistula and enteric drainage      Recent Results    No results found for this or any previous visit (from the past 24 hour(s)).        AP: 37 yo woman with EC fistula presented for fistula takedown     - Fistula takedown:  Explained to patient that since she is on Plavix, will need to hold off on medication for 5 days prior to operation  - Pt will need to stop plavix on Saturday and Lovenox on Tuesday morning  - Risks and benefits explained to patient including a possible 6-10 hours of operation given severity of disease  - To OR on Wednesday, March 14, 2018 for fistula takedown

## 2018-03-07 NOTE — IP AVS SNAPSHOT
[facility] [address] [place] [address] 
[phone] Patient: Ruth Shaw MRN: [id number] ITH:[id number] About your hospitalization You were admitted on:  March 7, 2018 You last received care in the:  [place] [address] You were discharged on:  May 23, 2018 Why you were hospitalized Your primary diagnosis was:  Not on File Your diagnoses also included:  Small Bowel Fistula Follow-up Information Follow up With Details Comments Contact Info [address] In 1 day Skilled Nursing for Tube Feed Education and Wound Care. Please call P#[phone] if you do not hear from [facility] by 11AM the day after discharge. [address] 
1st Floor [facility] [address] 
[phone] [address] In 1 day Home TPN. [address] [facility] [address] [phone] Abdias Lopes MD  Patients are required to make their own appointments with this practice. [facility] [address] [facility] [id number] [phone] Discharge Orders None A check reshma indicates which time of day the medication should be taken. My Medications START taking these medications Instructions Each Dose to Equal  
 Morning Noon Evening Bedtime  
 aspirin 325 mg tablet Commonly known as:  ASPIRIN Your last dose was: Your next dose is: Take 1 Tab by mouth daily. 325 mg CHANGE how you take these medications Instructions Each Dose to Equal  
 Morning Noon Evening Bedtime  
 insulin lispro 100 unit/mL injection Commonly known as:  HUMALOG What changed:   
- how to take this - when to take this 
- additional instructions Your last dose was: Your next dose is: As instructed on discharge instructions * morphine IR 15 mg tablet Commonly known as:  MS IR  
 What changed:   
- medication strength 
- how much to take - when to take this 
- reasons to take this Your last dose was: Your next dose is: Take 3 Tabs by mouth every four (4) hours as needed for Pain. Max Daily Amount: 270 mg.  
 45 mg  
    
   
   
   
  
 * morphine  mg CR tablet Commonly known as:  MS CONTIN What changed:  additional instructions Your last dose was: Your next dose is: Take 1 Tab by mouth every eight (8) hours. Max Daily Amount: 300 mg.  
 100 mg  
    
   
   
   
  
 ondansetron 4 mg disintegrating tablet Commonly known as:  ZOFRAN ODT What changed:  how much to take Your last dose was: Your next dose is: Take 1 Tab by mouth every eight (8) hours as needed for Nausea. 4 mg VITAMIN B-12 5,000 mcg Subl Generic drug:  cyanocobalamin (vitamin B-12) What changed:  Another medication with the same name was removed. Continue taking this medication, and follow the directions you see here. Your last dose was: Your next dose is: Take 5,000 mcg by mouth daily. 5000 mcg * Notice: This list has 2 medication(s) that are the same as other medications prescribed for you. Read the directions carefully, and ask your doctor or other care provider to review them with you. CONTINUE taking these medications Instructions Each Dose to Equal  
 Morning Noon Evening Bedtime  
 acetaminophen 325 mg tablet Commonly known as:  TYLENOL Your last dose was: Your next dose is: Take 650 mg by mouth every six (6) hours as needed for Pain. 650 mg  
    
   
   
   
  
 albuterol 90 mcg/actuation inhaler Commonly known as:  PROVENTIL HFA, VENTOLIN HFA, PROAIR HFA Your last dose was: Your next dose is: Take 2 Puffs by inhalation every four (4) hours as needed. 2 Puff clonazePAM 0.5 mg tablet Commonly known as:  Nasim Corral Your last dose was: Your next dose is: Take 1 Tab by mouth two (2) times daily as needed. Max Daily Amount: 1 mg. Indications: anxiety 0.5 mg  
    
   
   
   
  
 pantoprazole 40 mg tablet Commonly known as:  PROTONIX Your last dose was: Your next dose is: Take 1 Tab by mouth Daily (before breakfast). 40 mg  
    
   
   
   
  
 promethazine 25 mg tablet Commonly known as:  PHENERGAN Your last dose was: Your next dose is: Take 25 mg by mouth every six (6) hours as needed for Nausea. 25 mg  
    
   
   
   
  
 VITAMIN C 250 mg tablet Generic drug:  ascorbic acid (vitamin C) Your last dose was: Your next dose is: Take 250 mg by mouth two (2) times a day. 250 mg  
    
   
   
   
  
  
STOP taking these medications   
 clopidogrel 75 mg Tab Commonly known as:  PLAVIX  
   
  
 enoxaparin injection Commonly known as:  LOVENOX  
   
  
 fat emulsion 20% 20 % infusion Commonly known as:  LIPOSYN, INTRAlipid HEPARIN FLUSH IV  
   
  
 NORMAL SALINE FLUSH Generic drug:  sodium chloride  
   
  
 predniSONE 20 mg tablet Commonly known as:  Nicole Smoker Where to Get Your Medications These medications were sent to [facility] [address]  [address] [place] [id number] Phone:  [phone]  
  aspirin 325 mg tablet Information on where to get these meds will be given to you by the nurse or doctor. ! Ask your nurse or doctor about these medications  
  clonazePAM 0.5 mg tablet  
 morphine  mg CR tablet  
 morphine IR 15 mg tablet Opioid Education  Prescription Opioids: What You Need to Know: 
 
 
 
F-face looks uneven A-arms unable to move or move unevenly S-speech slurred or non-existent T-time-call 911 as soon as signs and symptoms begin-DO NOT go Back to bed or wait to see if you get better-TIME IS BRAIN. Warning Signs of HEART ATTACK Call 911 if you have these symptoms: 
? Chest discomfort. Most heart attacks involve discomfort in the center of the chest that lasts more than a few minutes, or that goes away and comes back. It can feel like uncomfortable pressure, squeezing, fullness, or pain. ? Discomfort in other areas of the upper body. Symptoms can include pain or discomfort in one or both arms, the back, neck, jaw, or stomach. ? Shortness of breath with or without chest discomfort. ? Other signs may include breaking out in a cold sweat, nausea, or lightheadedness. Don't wait more than five minutes to call [address]! Fast action can save your life. Calling 911 is almost always the fastest way to get lifesaving treatment. Emergency Medical Services staff can begin treatment when they arrive  up to an hour sooner than if someone gets to the hospital by car. The discharge information has been reviewed with the {PATIENT PARENT GUARDIAN:57139}. The {PATIENT PARENT GUARDIAN:58377} verbalized understanding. Discharge medications reviewed with the {Dishcarge meds reviewed [facility]:[address]} and appropriate educational materials and side effects teaching were provided. ___________________________________________________________________________________________________________________________________1.   Do not drive while on pain medication. You should take a stool softener while on pain medication to prevent constipation. 2.  Do not lift anything greater than 10 pounds for 2 weeks. 3.  You may resume your home medications. 4.  You may shower but do not swim or soak in tub for 4 weeks. 5.  Please call [phone] to schedule a follow-up with Dr. Roland Smiley within 3 weeks. Abdominal Pain: Care Instructions Your Care Instructions Abdominal pain has many possible causes. Some aren't serious and get better on their own in a few days. Others need more testing and treatment. If your pain continues or gets worse, you need to be rechecked and may need more tests to find out what is wrong. You may need surgery to correct the problem. Don't ignore new symptoms, such as fever, nausea and vomiting, urination problems, pain that gets worse, and dizziness. These may be signs of a more serious problem. Your doctor may have recommended a follow-up visit in the next 8 to 12 hours. If you are not getting better, you may need more tests or treatment. The doctor has checked you carefully, but problems can develop later. If you notice any problems or new symptoms, get medical treatment right away. Follow-up care is a key part of your treatment and safety. Be sure to make and go to all appointments, and call your doctor if you are having problems. It's also a good idea to know your test results and keep a list of the medicines you take. How can you care for yourself at home? · Rest until you feel better. · To prevent dehydration, drink plenty of fluids, enough so that your urine is light yellow or clear like water. Choose water and other caffeine-free clear liquids until you feel better. If you have kidney, heart, or liver disease and have to limit fluids, talk with your doctor before you increase the amount of fluids you drink.  
· If your stomach is upset, eat mild foods, such as rice, dry toast or crackers, bananas, and applesauce. Try eating several small meals instead of two or three large ones. · Wait until 48 hours after all symptoms have gone away before you have spicy foods, alcohol, and drinks that contain caffeine. · Do not eat foods that are high in fat. · Avoid anti-inflammatory medicines such as aspirin, ibuprofen (Advil, Motrin), and naproxen (Aleve). These can cause stomach upset. Talk to your doctor if you take daily aspirin for another health problem. When should you call for help? Call 911 anytime you think you may need emergency care. For example, call if: 
? · You passed out (lost consciousness). ? · You pass maroon or very bloody stools. ? · You vomit blood or what looks like coffee grounds. ? · You have new, severe belly pain. ?Call your doctor now or seek immediate medical care if: 
? · Your pain gets worse, especially if it becomes focused in one area of your belly. ? · You have a new or higher fever. ? · Your stools are black and look like tar, or they have streaks of blood. ? · You have unexpected vaginal bleeding. ? · You have symptoms of a urinary tract infection. These may include: 
¨ Pain when you urinate. ¨ Urinating more often than usual. 
¨ Blood in your urine. ? · You are dizzy or lightheaded, or you feel like you may faint. ? Watch closely for changes in your health, and be sure to contact your doctor if: 
? · You are not getting better after 1 day (24 hours). Where can you learn more? Go to http://cornell-terrell.info/. Enter E105 in the search box to learn more about \"Abdominal Pain: Care Instructions. \" Current as of: March 20, 2017 Content Version: 11.4 © 2010-6404 Keenjar. Care instructions adapted under license by [employer] (which disclaims liability or warranty for this information).  If you have questions about a medical condition or this instruction, always ask your healthcare professional. [place] [address] any warranty or liability for your use of this information. Learning About Stoma Reversal Surgery What is a stoma reversal? 
A stoma reversal is surgery to attach your bowel together after a colostomy or ileostomy (also called ostomies). During ostomy surgery, the bowel was  and attached to an opening made in the skin of your belly. The opening is called a stoma. Stool passes through the stoma and out of your body. Ostomy surgery can be permanent or temporary. It depends on the reason for the surgery. A stoma reversal can be done if there is a large enough section of healthy bowel left to be rejoined. A temporary ostomy may be used for certain health problems. These include problems such as bowel cancer, ulcerative colitis, Crohn's disease, or bowel injuries. How is a stoma reversal done? A stoma reversal is done after the original surgery has healed. The doctor rejoins the ends of the bowel that were . The bowel is stitched or stapled back together. The part of the belly where the stoma was is then closed with stitches. How the stoma reversal is done depends on what type of ostomy surgery you had. One type involves making a large cut (incision). This way takes longer to heal. The other type uses smaller cuts. It doesn't take as long to heal. 
When is a stoma reversal done? The stoma is closed after you've healed from the original surgery. This most often takes at least 6 to 8 weeks. But in some cases it can take up to 12 months. Your bowel and anal muscles need to be working for the reversal to work well. What can you expect after a stoma reversal? 
It's common to have problems with how the bowel works after a stoma reversal. This is because part of the bowel has been removed. You may have symptoms such as loose stool, incontinence, sudden bowel urges, and pain. Other risks include infection in the belly and blockage or scar tissue in the bowel. You may have the same precautions you had after your ostomy. Your doctor will want you to avoid bending, heavy lifting, and other strenuous activities. Your doctor can tell you when it's okay to return to your activities and routines, such as driving. This may take up to several weeks or months. Caring for yourself at home Your doctor may recommend things you can do at home to help improve how your bowel works. You may be told to: · Change your diet. · Eat small, frequent meals. · Drink plenty of fluids. · Keep a food diary. You can use it to track what you eat and how it affects you. As your bowel heals, you may work with a dietitian to know what foods are best. Your doctor may recommend walking or doing pelvic floor exercises. They may help improve your bowel function. You also may take medicines for diarrhea or use creams to help with soreness. Coping with bowel problems Dealing with bowel problems can be hard. Many people feel embarrassed or frustrated at times. But your care team can help. You can talk with your doctor or other members of your care team about these issues. They can help you seek support and learn ways to cope. Follow-up care is a key part of your treatment and safety. Be sure to make and go to all appointments, and call your doctor if you are having problems. It's also a good idea to know your test results and keep a list of the medicines you take. Where can you learn more? Go to http://cornell-terrell.info/. Enter [id number] in the search box to learn more about \"Learning About Stoma Reversal Surgery. \" Current as of: May 12, 2017 Content Version: 11.4 © 5322-3609 Healthwise, [employer]oka. Care instructions adapted under license by [employer] (which disclaims liability or warranty for this information).  If you have questions about a medical condition or this instruction, always ask your healthcare professional. [place] [address] any warranty or liability for your use of this information. [employer] Activation Thank you for requesting access to [employer]. Please follow the instructions below to securely access and download your online medical record. [employer] allows you to send messages to your doctor, view your test results, renew your prescriptions, schedule appointments, and more. How Do I Sign Up? 1. In your internet browser, go to [email or web address]. 2. Click on the First Time User? Click Here link in the Sign In box. You will see the New Member Sign Up page. 3. Enter your [employer] Access Code exactly as it appears below. You will not need to use this code after youve completed the sign-up process. If you do not sign up before the expiration date, you must request a new code. [employer] Access Code: [email or web address] Expires: 2018  8:40 PM (This is the date your [employer] access code will ) 4. Enter the last four digits of your Social Security Number (xxxx) and Date of Birth (mm/dd/yyyy) as indicated and click Submit. You will be taken to the next sign-up page. 5. Create a [employer] ID. This will be your [employer] login ID and cannot be changed, so think of one that is secure and easy to remember. 6. Create a [employer] password. You can change your password at any time. 7. Enter your Password Reset Question and Answer. This can be used at a later time if you forget your password. 8. Enter your e-mail address. You will receive e-mail notification when new information is available in [address] 9. Click Sign Up. You can now view and download portions of your medical record. 10. Click the Download Summary menu link to download a portable copy of your medical information. Additional Information If you have questions, please visit the Frequently Asked Questions section of the BrightWhistle website at [email or web address] Remember, MyChart is NOT to be used for urgent needs. For medical emergencies, dial 911. Introducing [facility]! [employer] introduces BrightWhistle patient portal. Now you can access parts of your medical record, email your doctor's office, and request medication refills online. 1. In your internet browser, go to [email or web address] 2. Click on the First Time User? Click Here link in the Sign In box. You will see the New Member Sign Up page. 3. Enter your BrightWhistle Access Code exactly as it appears below. You will not need to use this code after youve completed the sign-up process. If you do not sign up before the expiration date, you must request a new code. · [employer] Access Code: [email or web address] Expires: 7/24/2018  8:40 PM 
 
4. Enter the last four digits of your Social Security Number (xxxx) and Date of Birth (mm/dd/yyyy) as indicated and click Submit. You will be taken to the next sign-up page. 5. Create a BrightWhistle ID. This will be your BrightWhistle login ID and cannot be changed, so think of one that is secure and easy to remember. 6. Create a BrightWhistle password. You can change your password at any time. 7. Enter your Password Reset Question and Answer. This can be used at a later time if you forget your password. 8. Enter your e-mail address. You will receive e-mail notification when new information is available in [address] 9. Click Sign Up. You can now view and download portions of your medical record. 10. Click the Download Summary menu link to download a portable copy of your medical information. If you have questions, please visit the Frequently Asked Questions section of the BrightWhistle website. Remember, [employer]istle is NOT to be used for urgent needs. For medical emergencies, dial 911. Now available from your iPhone and Android! Introducing Juwan Fair As a Darian Farley patient, I wanted to make you aware of our electronic visit tool called Juwan KrausMiaoyushang. Darian GranadosKonnects/7 allows you to connect within minutes with a medical provider 24 hours a day, seven days a week via a mobile device or tablet or logging into a secure website from your computer. You can access Juwan Krausfin from anywhere in the [place]. A virtual visit might be right for you when you have a simple condition and feel like you just dont want to get out of bed, or cant get away from work for an appointment, when your regular [facility] provider is not available (evenings, weekends or holidays), or when youre out of town and need minor care. Electronic visits cost only $49 and if the [facility] FuelMiner/7 provider determines a prescription is needed to treat your condition, one can be electronically transmitted to a nearby pharmacy*. Please take a moment to enroll today if you have not already done so. The enrollment process is free and takes just a few minutes. To enroll, please download the The Beauty [employer] Essence Fashions/Shareable Ink consuelo to your tablet or phone, or visit [email or web address] to enroll on your computer. And, as an [address] [place] [address] patient with a Providajob account, the results of your visits will be scanned into your electronic medical record and your primary care provider will be able to view the scanned results. We urge you to continue to see your regular [facility] Farley provider for your ongoing medical care. And while your primary care provider may not be the one available when you seek a Juwan Fair virtual visit, the peace of mind you get from getting a real diagnosis real time can be priceless. For more information on uJwan Herculescarolinafin, view our Frequently Asked Questions (FAQs) at [email or web address]. Sincerely, 
 
Audie Smith MD 
Chief Medical Officer Angela8 Kaylin Lee *:  certain medications cannot be prescribed via Juwan Fair Providers Seen During Your Hospitalization Provider Specialty Primary office phone Jo Ann Edwards MD General Surgery [phone] Your Primary Care Physician (PCP) Primary Care Physician Office Phone Office Fax [address] 20 You are allergic to the following Allergen Reactions Sulfa (Sulfonamide Antibiotics) Anaphylaxis Demerol (Meperidine) Rash Soma (Carisoprodol) Rash Nausea Only Toradol (Ketorolac Tromethamine) Nausea and Vomiting Recent Documentation Height Weight BMI OB Status Smoking Status 1.626 m 147.3 kg 55.75 kg/m2 Unknown Former Smoker Emergency Contacts Name Discharge Info Relation Home Work Mobile Elidia 21 CAREGIVER [3] Mother [14] [phone] R Caitlin Becerra 2  Parent [1] [phone] Patient Belongings The following personal items are in your possession at time of discharge: 
  Dental Appliances: None  Visual Aid: None      Home Medications: None   Jewelry: Body Piercing (given to transporter to take back to room)  Clothing:  (2 plastic bags of clothing placed on lower frame of pt's bed)    Other Valuables: Eyeglasses Please provide this summary of care documentation to your next provider. Signatures-by signing, you are acknowledging that this After Visit Summary has been reviewed with you and you have received a copy. Patient Signature:  ____________________________________________________________ Date:  ____________________________________________________________  
  
Penny Kumar Provider Signature:  ____________________________________________________________ Date:  ____________________________________________________________

## 2018-03-07 NOTE — PROGRESS NOTES
Pt stating that NG tube is bothering her throat. NG tube noted coiled in the back of her throat - pt stated that it was like that in PACU prior to KUB. PACU called, will come back to reassess NG placement.

## 2018-03-07 NOTE — PROGRESS NOTES
TRANSFER - IN REPORT:    Verbal report received from Ansley Martin, Count includes the [facility] (name) on Oneda Lay  being received from PACU (unit) for routine post - op      Report consisted of patients Situation, Background, Assessment and   Recommendations(SBAR). Information from the following report(s) SBAR, Kardex, OR Summary, Procedure Summary, Intake/Output, MAR and Cardiac Rhythm normal sinus rhythm. was reviewed with the receiving nurse. Opportunity for questions and clarification was provided. Assessment completed upon patients arrival to unit and care assumed.

## 2018-03-07 NOTE — IP AVS SNAPSHOT
[address] [facility] Ramy Nixon 13 
[phone] Patient: Zita MRN: [id number] MMI:[id number] A check reshma indicates which time of day the medication should be taken. My Medications START taking these medications Instructions Each Dose to Equal  
 Morning Noon Evening Bedtime  
 aspirin 325 mg tablet Commonly known as:  ASPIRIN Your last dose was: Your next dose is: Take 1 Tab by mouth daily. 325 mg CHANGE how you take these medications Instructions Each Dose to Equal  
 Morning Noon Evening Bedtime  
 insulin lispro 100 unit/mL injection Commonly known as:  HUMALOG What changed:   
- how to take this - when to take this 
- additional instructions Your last dose was: Your next dose is: As instructed on discharge instructions * morphine IR 15 mg tablet Commonly known as:  MS IR What changed:   
- medication strength 
- how much to take - when to take this 
- reasons to take this Your last dose was: Your next dose is: Take 3 Tabs by mouth every four (4) hours as needed for Pain. Max Daily Amount: 270 mg.  
 45 mg  
    
   
   
   
  
 * morphine  mg CR tablet Commonly known as:  MS CONTIN What changed:  additional instructions Your last dose was: Your next dose is: Take 1 Tab by mouth every eight (8) hours. Max Daily Amount: 300 mg.  
 100 mg  
    
   
   
   
  
 ondansetron 4 mg disintegrating tablet Commonly known as:  ZOFRAN ODT What changed:  how much to take Your last dose was: Your next dose is: Take 1 Tab by mouth every eight (8) hours as needed for Nausea. 4 mg VITAMIN B-12 5,000 mcg Subl Generic drug:  cyanocobalamin (vitamin B-12) What changed:  Another medication with the same name was removed. Continue taking this medication, and follow the directions you see here. Your last dose was: Your next dose is: Take 5,000 mcg by mouth daily. 5000 mcg * Notice: This list has 2 medication(s) that are the same as other medications prescribed for you. Read the directions carefully, and ask your doctor or other care provider to review them with you. CONTINUE taking these medications Instructions Each Dose to Equal  
 Morning Noon Evening Bedtime  
 acetaminophen 325 mg tablet Commonly known as:  TYLENOL Your last dose was: Your next dose is: Take 650 mg by mouth every six (6) hours as needed for Pain. 650 mg  
    
   
   
   
  
 albuterol 90 mcg/actuation inhaler Commonly known as:  PROVENTIL HFA, VENTOLIN HFA, PROAIR HFA Your last dose was: Your next dose is: Take 2 Puffs by inhalation every four (4) hours as needed. 2 Puff  
    
   
   
   
  
 clonazePAM 0.5 mg tablet Commonly known as:  Milo Manzanilla Your last dose was: Your next dose is: Take 1 Tab by mouth two (2) times daily as needed. Max Daily Amount: 1 mg. Indications: anxiety 0.5 mg  
    
   
   
   
  
 pantoprazole 40 mg tablet Commonly known as:  PROTONIX Your last dose was: Your next dose is: Take 1 Tab by mouth Daily (before breakfast). 40 mg  
    
   
   
   
  
 promethazine 25 mg tablet Commonly known as:  PHENERGAN Your last dose was: Your next dose is: Take 25 mg by mouth every six (6) hours as needed for Nausea. 25 mg  
    
   
   
   
  
 VITAMIN C 250 mg tablet Generic drug:  ascorbic acid (vitamin C) Your last dose was: Your next dose is: Take 250 mg by mouth two (2) times a day.   
 250 mg  
    
   
   
   
  
  
 STOP taking these medications   
 clopidogrel 75 mg Tab Commonly known as:  PLAVIX  
   
  
 enoxaparin injection Commonly known as:  LOVENOX  
   
  
 fat emulsion 20% 20 % infusion Commonly known as:  LIPOSYN, INTRAlipid HEPARIN FLUSH IV  
   
  
 NORMAL SALINE FLUSH Generic drug:  sodium chloride  
   
  
 predniSONE 20 mg tablet Commonly known as:  Lashanda Hamilton Where to Get Your Medications These medications were sent to [facility]  [address] [place] [id number] Phone:  [phone]  
  aspirin 325 mg tablet Information on where to get these meds will be given to you by the nurse or doctor. ! Ask your nurse or doctor about these medications  
  clonazePAM 0.5 mg tablet  
 morphine  mg CR tablet  
 morphine IR 15 mg tablet

## 2018-03-08 ENCOUNTER — APPOINTMENT (OUTPATIENT)
Dept: GENERAL RADIOLOGY | Age: 43
DRG: 330 | End: 2018-03-08
Attending: SURGERY
Payer: MEDICARE

## 2018-03-08 ENCOUNTER — HOME CARE VISIT (OUTPATIENT)
Dept: HOME HEALTH SERVICES | Facility: HOME HEALTH | Age: 43
End: 2018-03-08
Payer: MEDICARE

## 2018-03-08 LAB
ALBUMIN SERPL-MCNC: 2.3 G/DL (ref 3.5–5)
ALBUMIN/GLOB SERPL: 0.8 {RATIO} (ref 1.1–2.2)
ALP SERPL-CCNC: 80 U/L (ref 45–117)
ALT SERPL-CCNC: 179 U/L (ref 12–78)
ANION GAP SERPL CALC-SCNC: 9 MMOL/L (ref 5–15)
AST SERPL-CCNC: 33 U/L (ref 15–37)
BACTERIA SPEC CULT: ABNORMAL
BACTERIA SPEC CULT: ABNORMAL
BASOPHILS # BLD: 0 K/UL (ref 0–0.1)
BASOPHILS NFR BLD: 0 % (ref 0–1)
BILIRUB SERPL-MCNC: 2.6 MG/DL (ref 0.2–1)
BUN SERPL-MCNC: 15 MG/DL (ref 6–20)
BUN/CREAT SERPL: 17 (ref 12–20)
CALCIUM SERPL-MCNC: 8.1 MG/DL (ref 8.5–10.1)
CHLORIDE SERPL-SCNC: 109 MMOL/L (ref 97–108)
CO2 SERPL-SCNC: 25 MMOL/L (ref 21–32)
CREAT SERPL-MCNC: 0.87 MG/DL (ref 0.55–1.02)
DIFFERENTIAL METHOD BLD: ABNORMAL
EOSINOPHIL # BLD: 0 K/UL (ref 0–0.4)
EOSINOPHIL NFR BLD: 0 % (ref 0–7)
ERYTHROCYTE [DISTWIDTH] IN BLOOD BY AUTOMATED COUNT: 18.6 % (ref 11.5–14.5)
ERYTHROCYTE [DISTWIDTH] IN BLOOD BY AUTOMATED COUNT: 19 % (ref 11.5–14.5)
GLOBULIN SER CALC-MCNC: 2.8 G/DL (ref 2–4)
GLUCOSE SERPL-MCNC: 126 MG/DL (ref 65–100)
HCT VFR BLD AUTO: 27.2 % (ref 35–47)
HCT VFR BLD AUTO: 28 % (ref 35–47)
HGB BLD-MCNC: 9.2 G/DL (ref 11.5–16)
HGB BLD-MCNC: 9.3 G/DL (ref 11.5–16)
IMM GRANULOCYTES # BLD: 0 K/UL
IMM GRANULOCYTES NFR BLD AUTO: 0 %
LYMPHOCYTES # BLD: 2 K/UL (ref 0.8–3.5)
LYMPHOCYTES NFR BLD: 22 % (ref 12–49)
MAGNESIUM SERPL-MCNC: 1.1 MG/DL (ref 1.6–2.4)
MCH RBC QN AUTO: 34.7 PG (ref 26–34)
MCH RBC QN AUTO: 35.4 PG (ref 26–34)
MCHC RBC AUTO-ENTMCNC: 32.9 G/DL (ref 30–36.5)
MCHC RBC AUTO-ENTMCNC: 34.2 G/DL (ref 30–36.5)
MCV RBC AUTO: 103.4 FL (ref 80–99)
MCV RBC AUTO: 105.7 FL (ref 80–99)
METAMYELOCYTES NFR BLD MANUAL: 1 %
MONOCYTES # BLD: 0.5 K/UL (ref 0–1)
MONOCYTES NFR BLD: 5 % (ref 5–13)
MYELOCYTES NFR BLD MANUAL: 2 %
NEUTS BAND NFR BLD MANUAL: 7 % (ref 0–6)
NEUTS SEG # BLD: 6.3 K/UL (ref 1.8–8)
NEUTS SEG NFR BLD: 63 % (ref 32–75)
NRBC # BLD: 0.15 K/UL (ref 0–0.01)
NRBC # BLD: 0.15 K/UL (ref 0–0.01)
NRBC BLD-RTO: 1.7 PER 100 WBC
NRBC BLD-RTO: 1.7 PER 100 WBC
PLATELET # BLD AUTO: 83 K/UL (ref 150–400)
PLATELET # BLD AUTO: 86 K/UL (ref 150–400)
PMV BLD AUTO: 10.9 FL (ref 8.9–12.9)
PMV BLD AUTO: 11.1 FL (ref 8.9–12.9)
POTASSIUM SERPL-SCNC: 3.5 MMOL/L (ref 3.5–5.1)
PROT SERPL-MCNC: 5.1 G/DL (ref 6.4–8.2)
RBC # BLD AUTO: 2.63 M/UL (ref 3.8–5.2)
RBC # BLD AUTO: 2.65 M/UL (ref 3.8–5.2)
RBC MORPH BLD: ABNORMAL
SERVICE CMNT-IMP: ABNORMAL
SODIUM SERPL-SCNC: 143 MMOL/L (ref 136–145)
WBC # BLD AUTO: 9 K/UL (ref 3.6–11)
WBC # BLD AUTO: 9.1 K/UL (ref 3.6–11)

## 2018-03-08 PROCEDURE — 36415 COLL VENOUS BLD VENIPUNCTURE: CPT | Performed by: SURGERY

## 2018-03-08 PROCEDURE — 80053 COMPREHEN METABOLIC PANEL: CPT | Performed by: SURGERY

## 2018-03-08 PROCEDURE — 74011250636 HC RX REV CODE- 250/636: Performed by: SURGERY

## 2018-03-08 PROCEDURE — 74011000258 HC RX REV CODE- 258: Performed by: SURGERY

## 2018-03-08 PROCEDURE — 83735 ASSAY OF MAGNESIUM: CPT | Performed by: SURGERY

## 2018-03-08 PROCEDURE — 3E0436Z INTRODUCTION OF NUTRITIONAL SUBSTANCE INTO CENTRAL VEIN, PERCUTANEOUS APPROACH: ICD-10-PCS | Performed by: SURGERY

## 2018-03-08 PROCEDURE — 97162 PT EVAL MOD COMPLEX 30 MIN: CPT

## 2018-03-08 PROCEDURE — 73630 X-RAY EXAM OF FOOT: CPT

## 2018-03-08 PROCEDURE — C9113 INJ PANTOPRAZOLE SODIUM, VIA: HCPCS | Performed by: SURGERY

## 2018-03-08 PROCEDURE — 74011250636 HC RX REV CODE- 250/636: Performed by: NURSE PRACTITIONER

## 2018-03-08 PROCEDURE — 71045 X-RAY EXAM CHEST 1 VIEW: CPT

## 2018-03-08 PROCEDURE — 65660000000 HC RM CCU STEPDOWN

## 2018-03-08 PROCEDURE — 97530 THERAPEUTIC ACTIVITIES: CPT

## 2018-03-08 PROCEDURE — 74011250637 HC RX REV CODE- 250/637: Performed by: PHYSICIAN ASSISTANT

## 2018-03-08 PROCEDURE — 74011250637 HC RX REV CODE- 250/637: Performed by: NURSE PRACTITIONER

## 2018-03-08 PROCEDURE — 3331090002 HH PPS REVENUE DEBIT

## 2018-03-08 PROCEDURE — 74011000250 HC RX REV CODE- 250: Performed by: SURGERY

## 2018-03-08 PROCEDURE — 85027 COMPLETE CBC AUTOMATED: CPT | Performed by: SURGERY

## 2018-03-08 PROCEDURE — 3331090001 HH PPS REVENUE CREDIT

## 2018-03-08 PROCEDURE — 73620 X-RAY EXAM OF FOOT: CPT

## 2018-03-08 PROCEDURE — 85025 COMPLETE CBC W/AUTO DIFF WBC: CPT | Performed by: SURGERY

## 2018-03-08 RX ORDER — SODIUM CHLORIDE 0.9 % (FLUSH) 0.9 %
10 SYRINGE (ML) INJECTION EVERY 8 HOURS
Status: DISCONTINUED | OUTPATIENT
Start: 2018-03-08 | End: 2018-05-23 | Stop reason: HOSPADM

## 2018-03-08 RX ORDER — MAGNESIUM SULFATE HEPTAHYDRATE 40 MG/ML
2 INJECTION, SOLUTION INTRAVENOUS ONCE
Status: COMPLETED | OUTPATIENT
Start: 2018-03-08 | End: 2018-03-08

## 2018-03-08 RX ORDER — BACITRACIN 500 UNIT/G
1 PACKET (EA) TOPICAL AS NEEDED
Status: DISCONTINUED | OUTPATIENT
Start: 2018-03-08 | End: 2018-03-08 | Stop reason: SDUPTHER

## 2018-03-08 RX ORDER — SCOLOPAMINE TRANSDERMAL SYSTEM 1 MG/1
1 PATCH, EXTENDED RELEASE TRANSDERMAL
Status: DISCONTINUED | OUTPATIENT
Start: 2018-03-08 | End: 2018-05-23 | Stop reason: HOSPADM

## 2018-03-08 RX ORDER — SODIUM CHLORIDE 0.9 % (FLUSH) 0.9 %
10 SYRINGE (ML) INJECTION EVERY 24 HOURS
Status: DISCONTINUED | OUTPATIENT
Start: 2018-03-08 | End: 2018-05-23 | Stop reason: HOSPADM

## 2018-03-08 RX ORDER — CLOPIDOGREL BISULFATE 75 MG/1
75 TABLET ORAL DAILY
Status: DISCONTINUED | OUTPATIENT
Start: 2018-03-09 | End: 2018-03-11

## 2018-03-08 RX ORDER — ENOXAPARIN SODIUM 150 MG/ML
135 INJECTION SUBCUTANEOUS EVERY 12 HOURS
Status: DISCONTINUED | OUTPATIENT
Start: 2018-03-08 | End: 2018-03-15

## 2018-03-08 RX ORDER — SODIUM CHLORIDE 0.9 % (FLUSH) 0.9 %
20 SYRINGE (ML) INJECTION AS NEEDED
Status: DISCONTINUED | OUTPATIENT
Start: 2018-03-08 | End: 2018-04-02

## 2018-03-08 RX ORDER — SODIUM CHLORIDE 0.9 % (FLUSH) 0.9 %
10 SYRINGE (ML) INJECTION AS NEEDED
Status: DISCONTINUED | OUTPATIENT
Start: 2018-03-08 | End: 2018-05-23 | Stop reason: HOSPADM

## 2018-03-08 RX ORDER — BACITRACIN 500 UNIT/G
1 PACKET (EA) TOPICAL AS NEEDED
Status: DISCONTINUED | OUTPATIENT
Start: 2018-03-08 | End: 2018-05-23 | Stop reason: HOSPADM

## 2018-03-08 RX ORDER — DRONABINOL 2.5 MG/1
2.5 CAPSULE ORAL 2 TIMES DAILY
Status: DISCONTINUED | OUTPATIENT
Start: 2018-03-08 | End: 2018-05-23 | Stop reason: HOSPADM

## 2018-03-08 RX ORDER — MAGNESIUM SULFATE HEPTAHYDRATE 40 MG/ML
2 INJECTION, SOLUTION INTRAVENOUS ONCE
Status: COMPLETED | OUTPATIENT
Start: 2018-03-08 | End: 2018-03-15

## 2018-03-08 RX ORDER — SODIUM CHLORIDE 0.9 % (FLUSH) 0.9 %
20 SYRINGE (ML) INJECTION AS NEEDED
Status: DISCONTINUED | OUTPATIENT
Start: 2018-03-08 | End: 2018-05-23 | Stop reason: HOSPADM

## 2018-03-08 RX ORDER — SODIUM CHLORIDE 0.9 % (FLUSH) 0.9 %
10 SYRINGE (ML) INJECTION AS NEEDED
Status: DISCONTINUED | OUTPATIENT
Start: 2018-03-08 | End: 2018-04-02

## 2018-03-08 RX ADMIN — DRONABINOL 2.5 MG: 2.5 CAPSULE ORAL at 17:59

## 2018-03-08 RX ADMIN — Medication 10 ML: at 12:03

## 2018-03-08 RX ADMIN — SODIUM CHLORIDE, SODIUM LACTATE, POTASSIUM CHLORIDE, AND CALCIUM CHLORIDE 125 ML/HR: 600; 310; 30; 20 INJECTION, SOLUTION INTRAVENOUS at 09:08

## 2018-03-08 RX ADMIN — SODIUM CHLORIDE 40 MG: 9 INJECTION INTRAMUSCULAR; INTRAVENOUS; SUBCUTANEOUS at 17:58

## 2018-03-08 RX ADMIN — ENOXAPARIN SODIUM 135 MG: 150 INJECTION SUBCUTANEOUS at 20:55

## 2018-03-08 RX ADMIN — PIPERACILLIN SODIUM,TAZOBACTAM SODIUM 3.38 G: 3; .375 INJECTION, POWDER, FOR SOLUTION INTRAVENOUS at 12:03

## 2018-03-08 RX ADMIN — Medication: at 08:22

## 2018-03-08 RX ADMIN — CALCIUM GLUCONATE: 94 INJECTION, SOLUTION INTRAVENOUS at 18:45

## 2018-03-08 RX ADMIN — ACETAMINOPHEN 1000 MG: 10 INJECTION, SOLUTION INTRAVENOUS at 17:59

## 2018-03-08 RX ADMIN — SODIUM CHLORIDE, SODIUM LACTATE, POTASSIUM CHLORIDE, AND CALCIUM CHLORIDE 125 ML/HR: 600; 310; 30; 20 INJECTION, SOLUTION INTRAVENOUS at 05:03

## 2018-03-08 RX ADMIN — PROMETHAZINE HYDROCHLORIDE 12.5 MG: 25 INJECTION INTRAMUSCULAR; INTRAVENOUS at 00:28

## 2018-03-08 RX ADMIN — LORAZEPAM 1 MG: 2 INJECTION INTRAMUSCULAR; INTRAVENOUS at 03:04

## 2018-03-08 RX ADMIN — Medication 10 ML: at 21:01

## 2018-03-08 RX ADMIN — ACETAMINOPHEN 1000 MG: 10 INJECTION, SOLUTION INTRAVENOUS at 09:09

## 2018-03-08 RX ADMIN — Medication 10 ML: at 12:02

## 2018-03-08 RX ADMIN — SODIUM CHLORIDE 1000 ML: 900 INJECTION, SOLUTION INTRAVENOUS at 06:00

## 2018-03-08 RX ADMIN — PIPERACILLIN SODIUM,TAZOBACTAM SODIUM 3.38 G: 3; .375 INJECTION, POWDER, FOR SOLUTION INTRAVENOUS at 20:55

## 2018-03-08 RX ADMIN — SODIUM CHLORIDE, SODIUM LACTATE, POTASSIUM CHLORIDE, AND CALCIUM CHLORIDE 125 ML/HR: 600; 310; 30; 20 INJECTION, SOLUTION INTRAVENOUS at 18:03

## 2018-03-08 RX ADMIN — ENOXAPARIN SODIUM 135 MG: 150 INJECTION SUBCUTANEOUS at 10:33

## 2018-03-08 RX ADMIN — DRONABINOL 2.5 MG: 2.5 CAPSULE ORAL at 12:02

## 2018-03-08 RX ADMIN — MAGNESIUM SULFATE HEPTAHYDRATE 2 G: 40 INJECTION, SOLUTION INTRAVENOUS at 06:26

## 2018-03-08 RX ADMIN — ONDANSETRON HYDROCHLORIDE 4 MG: 2 INJECTION INTRAMUSCULAR; INTRAVENOUS at 17:59

## 2018-03-08 RX ADMIN — PROMETHAZINE HYDROCHLORIDE 12.5 MG: 25 INJECTION INTRAMUSCULAR; INTRAVENOUS at 21:55

## 2018-03-08 RX ADMIN — ONDANSETRON HYDROCHLORIDE 4 MG: 2 INJECTION INTRAMUSCULAR; INTRAVENOUS at 06:26

## 2018-03-08 RX ADMIN — ACETAMINOPHEN 1000 MG: 10 INJECTION, SOLUTION INTRAVENOUS at 02:15

## 2018-03-08 RX ADMIN — SODIUM CHLORIDE 1000 ML: 900 INJECTION, SOLUTION INTRAVENOUS at 03:25

## 2018-03-08 RX ADMIN — MAGNESIUM SULFATE HEPTAHYDRATE 2 G: 40 INJECTION, SOLUTION INTRAVENOUS at 08:23

## 2018-03-08 NOTE — PROGRESS NOTES
Progress Note    Patient: Estelita Bush MRN: [id number]  SSN: [id number]    YOB: 1975  Age: 38 y.o. Sex: female      Admit Date: 3/7/2018    1 Day Post-Op    Procedure:  Procedure(s):  EXPLORATORY LAPAROTOMY, LYSIS OF ADHESION X 5HOURS, SMALL BOWEL FISTULA TAKE DOWN X 2 AND WOUND VAC PLACEMENT    Subjective:     Patient complains of pain in her left foot. Objective:     Visit Vitals    /84 (BP 1 Location: Left arm, BP Patient Position: At rest)    Pulse (!) 105    Temp 99.3 °F (37.4 °C)    Resp 16    Ht 5' 4\" (1.626 m)    Wt 302 lb (137 kg)    SpO2 94%    BMI 51.84 kg/m2       Temp (24hrs), Av.5 °F (36.9 °C), Min:97.2 °F (36.2 °C), Max:99.5 °F (37.5 °C)      Physical Exam:    GENERAL: arousable , LUNG: clear to auscultation bilaterally, HEART: regular rate and rhythm, ABDOMEN: soft. Vac dressing in place. Ostomy pink and viable. Joshua serosanginous, EXTREMITIES:  , edema 1+    Data Review: images and reports reviewed  cxr- negative  Lab Review: All lab results for the last 24 hours reviewed.   Recent Results (from the past 24 hour(s))   CBC W/O DIFF    Collection Time: 18  3:11 AM   Result Value Ref Range    WBC 9.1 3.6 - 11.0 K/uL    RBC 2.63 (L) 3.80 - 5.20 M/uL    HGB 9.3 (L) 11.5 - 16.0 g/dL    HCT 27.2 (L) 35.0 - 47.0 %    .4 (H) 80.0 - 99.0 FL    MCH 35.4 (H) 26.0 - 34.0 PG    MCHC 34.2 30.0 - 36.5 g/dL    RDW 18.6 (H) 11.5 - 14.5 %    PLATELET 86 (L) 491 - 400 K/uL    MPV 10.9 8.9 - 12.9 FL    NRBC 1.7 (H) 0  WBC    ABSOLUTE NRBC 0.15 (H) 0.00 - 0.52 K/uL   METABOLIC PANEL, COMPREHENSIVE    Collection Time: 18  3:11 AM   Result Value Ref Range    Sodium 143 136 - 145 mmol/L    Potassium 3.5 3.5 - 5.1 mmol/L    Chloride 109 (H) 97 - 108 mmol/L    CO2 25 21 - 32 mmol/L    Anion gap 9 5 - 15 mmol/L    Glucose 126 (H) 65 - 100 mg/dL    BUN 15 6 - 20 MG/DL    Creatinine 0.87 0.55 - 1.02 MG/DL    BUN/Creatinine ratio 17 12 - 20      GFR est AA >60 >60 ml/min/1.73m2    GFR est non-AA >60 >60 ml/min/1.73m2    Calcium 8.1 (L) 8.5 - 10.1 MG/DL    Bilirubin, total 2.6 (H) 0.2 - 1.0 MG/DL    ALT (SGPT) 179 (H) 12 - 78 U/L    AST (SGOT) 33 15 - 37 U/L    Alk. 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    PT/ot  Continue vac dressing  TPN  Lovenox  Restart plavix tomorrow  Replete Hypomagnesemia IV    Signed By: Marleny Handy MD     March 8, 2018

## 2018-03-08 NOTE — PROGRESS NOTES
participated in [facility] rounds this am.  I will meet with patient and her mom Martin Crouch later this am.  PT is currently evaluating patient and her nurse Ike Pierson has given me an update. Patient most recently was followed by home health.

## 2018-03-08 NOTE — PROGRESS NOTES
0518: Fluid bolus finished. Patient's heart rate is still sustained in the 130's.     0523: Spoke with MD.  Another bolus of NS ordered and 4g of mag.

## 2018-03-08 NOTE — ANESTHESIA POSTPROCEDURE EVALUATION
Post-Anesthesia Evaluation and Assessment    Patient: Js Klein MRN: [id number]  SSN: [id number]    YOB: 1975  Age: 38 y.o. Sex: female       Cardiovascular Function/Vital Signs  Visit Vitals    /84 (BP 1 Location: Left arm, BP Patient Position: At rest)    Pulse (!) 105    Temp 37.4 °C (99.3 °F)    Resp 16    Ht 5' 4\" (1.626 m)    Wt 137 kg (302 lb)    SpO2 94%    BMI 51.84 kg/m2       Patient is status post general anesthesia for Procedure(s):  EXPLORATORY LAPAROTOMY, LYSIS OF ADHESION X 5HOURS, SMALL BOWEL FISTULA TAKE DOWN X 2 AND WOUND VAC PLACEMENT. Nausea/Vomiting: None    Postoperative hydration reviewed and adequate. Pain:  Pain Scale 1: Numeric (0 - 10) (03/08/18 0028)  Pain Intensity 1: 7 (03/08/18 0028)   Managed    Neurological Status:   Neuro (WDL): Within Defined Limits (03/07/18 1730)  Neuro  Neurologic State: Drowsy (03/08/18 0028)  Orientation Level: Oriented X4 (03/08/18 0028)  Cognition: Follows commands (03/08/18 0028)   At baseline    Mental Status and Level of Consciousness: Arousable    Pulmonary Status:   O2 Device: Room air (03/08/18 0028)   Adequate oxygenation and airway patent    Complications related to anesthesia: None    Post-anesthesia assessment completed.  No concerns    Signed By: Emily Valencia MD     March 8, 2018

## 2018-03-08 NOTE — PROGRESS NOTES
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 CrCl = >100 ml/min  Current Dose: 150 mg subcutaneously every 12 hours.   Plan: Change to 135 mg Q12H per P&T approved dose rounding protocol     (CLOSE I-vent after review and verification)

## 2018-03-08 NOTE — PROGRESS NOTES
Reason for Readmission:    RRAT Score and Risk Level:  Level of Readmission:   3 admissions in 6 months   Care Conference scheduled:  Resources/supports as identified by patient/family:  Challenges facing patient:  Finances  [employer] medicare   Transportation mom  Support system mom  Living arrangements lives with mom Romi Loo ph [phone]  Self-care/ADLs/Cognition   Connection to healthcare providers/adherence to healthcare plan    Health literacy                Prescription concerns   Current Advanced Care Plan:  Plan for utilizing home health:  [employer] while patient is hospitalized:patient requires exploratory lap with lysis of adhesions with small bowel fistula repair   Expected Date of Discharge: tentative discharge early to mid week   Transition of Care Plan:  Plan for communication with patient post discharge (who, when, how):Patient will need home health and [facility]   They closed her case in late Feb, of this year  Likelihood of additional readmission:   Patient may require further surgery in the next few months per surgeon

## 2018-03-08 NOTE — PROGRESS NOTES
03/08/18 0311   Vitals   Temp 99.5 °F (37.5 °C)   Temp Source Oral   Pulse (Heart Rate) (!) 136   Heart Rate Source Monitor   Resp Rate 18   O2 Sat (%) 95 %   Level of Consciousness Alert   BP 98/63   MAP (Calculated) 75   BP 1 Location Left arm   BP 1 Method Automatic   BP Patient Position At rest   Cardiac Rhythm NSR   MEWS Score 5   Patients HR is increased and has spiked a temp.   Paula CROWE.

## 2018-03-08 NOTE — PROGRESS NOTES
Care Management Interventions  PCP Verified by CM: Yes (Dr Eneida Temple)  Palliative Care Criteria Met (RRAT>21 & CHF Dx)?: No  Mode of Transport at Discharge:  (mom car)  Transition of Care Consult (CM Consult): Home Health ([address]  [address]: Yes  MyChart Signup: No  Discharge Durable Medical Equipment: No  Physical Therapy Consult: No  Occupational Therapy Consult: No  Speech Therapy Consult: No  Current Support Network: Relative's Home (MOM [address] [phone]  MOM IS STAYING WITH PATIENT  [facility] )  Confirm Follow Up Transport:  (TBD)  Plan discussed with Pt/Family/Caregiver:  (MOM ELMA AND HER FRIEND JUAN )  Freedom of Choice Offered: Yes  [address] Provided?: No  Discharge Location  Discharge Placement: Home with home health    participated in [facility] rounds and met with patient, her mom Rafael Rivas and the family friend Eva Agudelo who was in room. Patient has an NG tube and remains on PCA. Up until this admission patient had been open to [facility] 30 mom wishes to have them re-open case upon discharge from hospital.  Per mom she outlined patient's past medical problems and at one point her daughter was at [facility] for two months and she definitely wishtes to have her daughter go home. The pharmacy of choice for patient is [facility] in [place]   Presently patient does not have an AMD in place and mom is not ready to address this at this time. She does request a bariatic chair as she plans on staying with patient and I relayed this information to her nurse and charge nurse. Care management will follow for transitions of care needs.

## 2018-03-08 NOTE — PROGRESS NOTES
Bedside shift change report given to Thomas Pugh RN (oncoming nurse) by Mary Ann Mccann RN (offgoing nurse). Report included the following information SBAR, OR Summary, MAR, Med Rec Status and Cardiac Rhythm Sinus Tachycardia.

## 2018-03-08 NOTE — PROGRESS NOTES
NG tube coiled in patient's mouth, patient found attempting to pull the tube out through her mouth. Order from MD to reposition tube, tube repositioned and CXR ordered.     2300: Restarted NGT to low continuous suction

## 2018-03-08 NOTE — PROGRESS NOTES
Physical Therapy Note:   Consult received and chart reviewed. Patient received in bed c/o 7/10 pain. When introduced to the patient and her mother, the patient's mother stated, \"She just had surgery yesterday. \" Encouraged early mobility to aide recovery with continued deferral d/t pain. The patient and her mother report a fall last week with left lateral foot pain which impairs WB but has not been mentioned before. Toes 3-5 on left foot appear with a yellow/purple hematoma on each. Discussed with vargas CROWE and nsg. Will attempt again in pm and mobilize as patient allows.   Tarun Arechiga, PT, DPT

## 2018-03-08 NOTE — PROGRESS NOTES
Problem: Mobility Impaired (Adult and Pediatric)  Goal: *Acute Goals and Plan of Care (Insert Text)  Physical Therapy Goals  Initiated 3/8/2018  1. Patient will move from supine to sit and sit to supine  in bed with moderate assistance  within 7 day(s). 2.  Patient will transfer from bed to chair and chair to bed with moderate assistance  using the least restrictive device within 7 day(s). 3.  Patient will perform sit to stand with moderate assistance  within 7 day(s). 4.  PT will assess gait with the least restrictive device within 7 day(s). physical Therapy EVALUATION  Patient: Maria De Jesus Hensley (38 y.o. female)  Date: 3/8/2018  Primary Diagnosis: FISTULA  Small bowel fistula  Procedure(s) (LRB):  EXPLORATORY LAPAROTOMY, LYSIS OF ADHESION X 5HOURS, SMALL BOWEL FISTULA TAKE DOWN X 2 AND WOUND VAC PLACEMENT (N/A) 1 Day Post-Op   Precautions: Contact       ASSESSMENT :  Based on the objective data described below, the patient presents with high levels of pain, anxiety regarding activity impairing functional mobility post op. Attempted to see x2 today and deferred for the am d/t reported pain levels but agreed to return for the pm after adjustment of meds. Patient agreeable to sit EOB with encouragement via log roll technique. Mod x2 required to roll to side and attempt sidelying to sit. Patient screamed in pain as she attempted to sit and sobbed once seated EOB. Returned to supine at her request due to severity of pain. Once returned supine, a staff member accidentally provided traction on woodruff cath which remained in retainer attached to the patient's leg but resulted in c/o of additional pain. Will follow to progress mobility as patient and her pain allow. Discharge recommendations to be determined with improved pain. She typically lives with her mother who was present throughout the session. Patient will benefit from skilled intervention to address the above impairments.   Patients rehabilitation potential is considered to be Good  Factors which may influence rehabilitation potential include:   []         None noted  []         Mental ability/status  []         Medical condition  []         Home/family situation and support systems  []         Safety awareness  [x]         Pain tolerance/management  []         Other:      PLAN :  Recommendations and Planned Interventions:  [x]           Bed Mobility Training             [x]    Neuromuscular Re-Education  [x]           Transfer Training                   []    Orthotic/Prosthetic Training  [x]           Gait Training                         []    Modalities  [x]           Therapeutic Exercises           []    Edema Management/Control  [x]           Therapeutic Activities            []    Patient and Family Training/Education  []           Other (comment):    Frequency/Duration: Patient will be followed by physical therapy  5 times a week to address goals. Discharge Recommendations: Rehab and To Be Determined  Further Equipment Recommendations for Discharge: None so far     SUBJECTIVE:   Patient stated I just had surgery yesterday.     OBJECTIVE DATA SUMMARY:   HISTORY:    Past Medical History:   Diagnosis Date    Adverse effect of anesthesia     WOKE DURING SURGERY    Arthritis     Blood clot in vein 2017    STOMACH    Crohn's disease ([facility] [address] [address] 8/15/2011    DVT (deep venous thrombosis) ([facility] [address] 8/15/2011    LEFT LEG    Edema     GENERALIZED R/T TPN; WAIST DOWN    Incarcerated ventral hernia 8/15/2011    Lupus     Lyme disease     Psychiatric disorder     ANXIETY    Right flank pain 8/22/2011    Seizures ([facility] [address] 1990    LAST AT AGE 17    Stroke [facility]) 1990    age 17;  A WEEK POST OP, HAD SEIZURES AND STROKE, WAS IN COMA FOR A MONTH    Thyroid disease     HYPO-NO MEDS     Past Surgical History:   Procedure Laterality Date    HX APPENDECTOMY      HX GYN  2015    HIDRADENTIS LABIA    HX OTHER SURGICAL      multiple procedures related to her Crohn's disease    HX OTHER SURGICAL      ABDOMINAL SURGERY REMOVING COLON, 2/3 STOMACH, 1/3 SMALL INTESTINE    VT LAP, INCISIONAL HERNIA REPAIR,INCARCERATED  9-10-08    dr. Ryan Cruz     Prior Level of Function/Home Situation: ambulated with quad cane prior to illness  Personal factors and/or comorbidities impacting plan of care:     Home Situation  Home Environment: Private residence (lives with mom)  # Steps to Enter: 0  One/Two Story Residence: One story  Living Alone: No  Support Systems: Home care staff, Parent  Patient Expects to be Discharged to[de-identified] Private residence  Current DME Used/Available at Home: [address] quad    EXAMINATION/PRESENTATION/DECISION MAKING:   Critical Behavior:  Neurologic State: Drowsy  Orientation Level: Oriented X4  Cognition: Follows commands     Hearing: Auditory  Auditory Impairment: None  Skin: dressings, NG tube, and left CHRIS drains intact  Edema: pitting edema BLE  Range Of Motion:  AROM: Generally decreased, functional                       Strength:    Strength: Generally decreased, functional                    Tone & Sensation:                  Sensation: Intact               Coordination:  Coordination: Within functional limits  Vision:      Functional Mobility:  Bed Mobility:  Rolling:  Moderate assistance;Assist x2  Supine to Sit: Moderate assistance;Assist x2           Physical Therapy Evaluation Charge Determination   History Examination Presentation Decision-Making   MEDIUM  Complexity : 1-2 comorbidities / personal factors will impact the outcome/ POC  MEDIUM Complexity : 3 Standardized tests and measures addressing body structure, function, activity limitation and / or participation in recreation  MEDIUM Complexity : Evolving with changing characteristics  MEDIUM Complexity : FOTO score of 26-74      Based on the above components, the patient evaluation is determined to be of the following complexity level: MEDIUM    Pain:  Pain Scale 1: Visual (pt resting)  Pain Intensity 1: 0              Activity Tolerance:     Please refer to the flowsheet for vital signs taken during this treatment. After treatment:   []         Patient left in no apparent distress sitting up in chair  [x]         Patient left in no apparent distress in bed  [x]         Call bell left within reach  [x]         Nursing notified  []         Caregiver present  []         Bed alarm activated    COMMUNICATION/EDUCATION:   The patients plan of care was discussed with: Registered Nurse. [x]         Fall prevention education was provided and the patient/caregiver indicated understanding. [x]         Patient/family have participated as able in goal setting and plan of care. [x]         Patient/family agree to work toward stated goals and plan of care. []         Patient understands intent and goals of therapy, but is neutral about his/her participation. []         Patient is unable to participate in goal setting and plan of care.     Thank you for this referral.  Dat Syed, PT, DPT   Time Calculation: 23 mins

## 2018-03-08 NOTE — PROGRESS NOTES
Bedside shift change report given to Akshat Velazquez RN (oncoming nurse) by Isabel Arredondo RN (offgoing nurse). Report included the following information SBAR, Kardex, OR Summary, Procedure Summary, Intake/Output, MAR, Med Rec Status and Cardiac Rhythm normal sinus rhythm.

## 2018-03-09 ENCOUNTER — APPOINTMENT (OUTPATIENT)
Dept: GENERAL RADIOLOGY | Age: 43
DRG: 330 | End: 2018-03-09
Attending: PODIATRIST
Payer: MEDICARE

## 2018-03-09 LAB
ANION GAP SERPL CALC-SCNC: 6 MMOL/L (ref 5–15)
BASOPHILS # BLD: 0 K/UL (ref 0–0.1)
BASOPHILS NFR BLD: 0 % (ref 0–1)
BUN SERPL-MCNC: 14 MG/DL (ref 6–20)
BUN/CREAT SERPL: 18 (ref 12–20)
CALCIUM SERPL-MCNC: 7.6 MG/DL (ref 8.5–10.1)
CHLORIDE SERPL-SCNC: 110 MMOL/L (ref 97–108)
CO2 SERPL-SCNC: 26 MMOL/L (ref 21–32)
CREAT SERPL-MCNC: 0.76 MG/DL (ref 0.55–1.02)
DIFFERENTIAL METHOD BLD: ABNORMAL
EOSINOPHIL # BLD: 0.1 K/UL (ref 0–0.4)
EOSINOPHIL NFR BLD: 1 % (ref 0–7)
ERYTHROCYTE [DISTWIDTH] IN BLOOD BY AUTOMATED COUNT: 18.6 % (ref 11.5–14.5)
GLUCOSE BLD STRIP.AUTO-MCNC: 154 MG/DL (ref 65–100)
GLUCOSE SERPL-MCNC: 147 MG/DL (ref 65–100)
HCT VFR BLD AUTO: 26.2 % (ref 35–47)
HGB BLD-MCNC: 8.5 G/DL (ref 11.5–16)
IMM GRANULOCYTES # BLD: 0 K/UL
IMM GRANULOCYTES NFR BLD AUTO: 0 %
LYMPHOCYTES # BLD: 1.1 K/UL (ref 0.8–3.5)
LYMPHOCYTES NFR BLD: 16 % (ref 12–49)
MAGNESIUM SERPL-MCNC: 2 MG/DL (ref 1.6–2.4)
MCH RBC QN AUTO: 33.9 PG (ref 26–34)
MCHC RBC AUTO-ENTMCNC: 32.4 G/DL (ref 30–36.5)
MCV RBC AUTO: 104.4 FL (ref 80–99)
MONOCYTES # BLD: 0.3 K/UL (ref 0–1)
MONOCYTES NFR BLD: 4 % (ref 5–13)
MYELOCYTES NFR BLD MANUAL: 2 %
NEUTS BAND NFR BLD MANUAL: 10 % (ref 0–6)
NEUTS SEG # BLD: 5.2 K/UL (ref 1.8–8)
NEUTS SEG NFR BLD: 67 % (ref 32–75)
NRBC # BLD: 0.09 K/UL (ref 0–0.01)
NRBC BLD-RTO: 1.3 PER 100 WBC
PHOSPHATE SERPL-MCNC: 1.5 MG/DL (ref 2.6–4.7)
PLATELET # BLD AUTO: 79 K/UL (ref 150–400)
PMV BLD AUTO: 10.5 FL (ref 8.9–12.9)
POTASSIUM SERPL-SCNC: 2.7 MMOL/L (ref 3.5–5.1)
RBC # BLD AUTO: 2.51 M/UL (ref 3.8–5.2)
RBC MORPH BLD: ABNORMAL
SERVICE CMNT-IMP: ABNORMAL
SODIUM SERPL-SCNC: 142 MMOL/L (ref 136–145)
WBC # BLD AUTO: 6.7 K/UL (ref 3.6–11)

## 2018-03-09 PROCEDURE — 74011250636 HC RX REV CODE- 250/636: Performed by: SURGERY

## 2018-03-09 PROCEDURE — 77030010541

## 2018-03-09 PROCEDURE — 83735 ASSAY OF MAGNESIUM: CPT | Performed by: SURGERY

## 2018-03-09 PROCEDURE — 74011000258 HC RX REV CODE- 258: Performed by: SURGERY

## 2018-03-09 PROCEDURE — 74011250637 HC RX REV CODE- 250/637: Performed by: PHYSICIAN ASSISTANT

## 2018-03-09 PROCEDURE — 77030010520

## 2018-03-09 PROCEDURE — 77030019952 HC CANSTR VAC ASST KCON -B

## 2018-03-09 PROCEDURE — 36415 COLL VENOUS BLD VENIPUNCTURE: CPT | Performed by: SURGERY

## 2018-03-09 PROCEDURE — 65210000002 HC RM PRIVATE GYN

## 2018-03-09 PROCEDURE — 77030010522

## 2018-03-09 PROCEDURE — 84100 ASSAY OF PHOSPHORUS: CPT | Performed by: SURGERY

## 2018-03-09 PROCEDURE — 74011250636 HC RX REV CODE- 250/636: Performed by: NURSE PRACTITIONER

## 2018-03-09 PROCEDURE — 80048 BASIC METABOLIC PNL TOTAL CA: CPT | Performed by: SURGERY

## 2018-03-09 PROCEDURE — 73590 X-RAY EXAM OF LOWER LEG: CPT

## 2018-03-09 PROCEDURE — 3331090001 HH PPS REVENUE CREDIT

## 2018-03-09 PROCEDURE — 97606 NEG PRS WND THER DME>50 SQCM: CPT

## 2018-03-09 PROCEDURE — 77030011641 HC PASTE OST ADH BMS -A

## 2018-03-09 PROCEDURE — 74011000250 HC RX REV CODE- 250: Performed by: SURGERY

## 2018-03-09 PROCEDURE — 77030018717 HC DRSG GRNUFM KCON -B

## 2018-03-09 PROCEDURE — 85025 COMPLETE CBC W/AUTO DIFF WBC: CPT | Performed by: SURGERY

## 2018-03-09 PROCEDURE — 74011250637 HC RX REV CODE- 250/637: Performed by: SURGERY

## 2018-03-09 PROCEDURE — 93971 EXTREMITY STUDY: CPT

## 2018-03-09 PROCEDURE — 82962 GLUCOSE BLOOD TEST: CPT

## 2018-03-09 PROCEDURE — 74011636637 HC RX REV CODE- 636/637: Performed by: SURGERY

## 2018-03-09 PROCEDURE — 3331090002 HH PPS REVENUE DEBIT

## 2018-03-09 RX ORDER — POTASSIUM CHLORIDE 14.9 MG/ML
10 INJECTION INTRAVENOUS
Status: COMPLETED | OUTPATIENT
Start: 2018-03-09 | End: 2018-03-15

## 2018-03-09 RX ORDER — DEXTROSE 50 % IN WATER (D50W) INTRAVENOUS SYRINGE
12.5-25 AS NEEDED
Status: DISCONTINUED | OUTPATIENT
Start: 2018-03-09 | End: 2018-05-23 | Stop reason: HOSPADM

## 2018-03-09 RX ORDER — INSULIN LISPRO 100 [IU]/ML
INJECTION, SOLUTION INTRAVENOUS; SUBCUTANEOUS EVERY 6 HOURS
Status: DISCONTINUED | OUTPATIENT
Start: 2018-03-09 | End: 2018-04-06

## 2018-03-09 RX ORDER — SILVER NITRATE 38.21; 12.74 MG/1; MG/1
3 STICK TOPICAL
Status: COMPLETED | OUTPATIENT
Start: 2018-03-09 | End: 2018-03-09

## 2018-03-09 RX ORDER — POTASSIUM CHLORIDE 750 MG/1
40 TABLET, FILM COATED, EXTENDED RELEASE ORAL 2 TIMES DAILY
Status: DISCONTINUED | OUTPATIENT
Start: 2018-03-09 | End: 2018-03-10

## 2018-03-09 RX ORDER — MAGNESIUM SULFATE 100 %
4 CRYSTALS MISCELLANEOUS AS NEEDED
Status: DISCONTINUED | OUTPATIENT
Start: 2018-03-09 | End: 2018-05-23 | Stop reason: HOSPADM

## 2018-03-09 RX ADMIN — Medication 10 ML: at 02:25

## 2018-03-09 RX ADMIN — SODIUM CHLORIDE, SODIUM LACTATE, POTASSIUM CHLORIDE, AND CALCIUM CHLORIDE 125 ML/HR: 600; 310; 30; 20 INJECTION, SOLUTION INTRAVENOUS at 04:26

## 2018-03-09 RX ADMIN — ONDANSETRON HYDROCHLORIDE 4 MG: 2 INJECTION INTRAMUSCULAR; INTRAVENOUS at 14:17

## 2018-03-09 RX ADMIN — ONDANSETRON HYDROCHLORIDE 4 MG: 2 INJECTION INTRAMUSCULAR; INTRAVENOUS at 02:25

## 2018-03-09 RX ADMIN — FAMOTIDINE: 10 INJECTION, SOLUTION INTRAVENOUS at 19:51

## 2018-03-09 RX ADMIN — ONDANSETRON HYDROCHLORIDE 4 MG: 2 INJECTION INTRAMUSCULAR; INTRAVENOUS at 23:53

## 2018-03-09 RX ADMIN — INSULIN LISPRO 3 UNITS: 100 INJECTION, SOLUTION INTRAVENOUS; SUBCUTANEOUS at 19:43

## 2018-03-09 RX ADMIN — SILVER NITRATE APPLICATORS 3 APPLICATOR: 25; 75 STICK TOPICAL at 18:59

## 2018-03-09 RX ADMIN — POTASSIUM CHLORIDE 40 MEQ: 750 TABLET, EXTENDED RELEASE ORAL at 18:43

## 2018-03-09 RX ADMIN — Medication 10 ML: at 22:00

## 2018-03-09 RX ADMIN — ONDANSETRON HYDROCHLORIDE 4 MG: 2 INJECTION INTRAMUSCULAR; INTRAVENOUS at 19:46

## 2018-03-09 RX ADMIN — POTASSIUM CHLORIDE 10 MEQ: 200 INJECTION, SOLUTION INTRAVENOUS at 04:25

## 2018-03-09 RX ADMIN — POTASSIUM CHLORIDE 10 MEQ: 200 INJECTION, SOLUTION INTRAVENOUS at 09:37

## 2018-03-09 RX ADMIN — POTASSIUM CHLORIDE 10 MEQ: 200 INJECTION, SOLUTION INTRAVENOUS at 07:57

## 2018-03-09 RX ADMIN — DRONABINOL 2.5 MG: 2.5 CAPSULE ORAL at 18:43

## 2018-03-09 RX ADMIN — ENOXAPARIN SODIUM 135 MG: 150 INJECTION SUBCUTANEOUS at 21:22

## 2018-03-09 RX ADMIN — POTASSIUM CHLORIDE 40 MEQ: 750 TABLET, EXTENDED RELEASE ORAL at 09:37

## 2018-03-09 RX ADMIN — Medication 10 ML: at 09:38

## 2018-03-09 RX ADMIN — Medication: at 03:41

## 2018-03-09 RX ADMIN — DRONABINOL 2.5 MG: 2.5 CAPSULE ORAL at 09:37

## 2018-03-09 RX ADMIN — CLOPIDOGREL BISULFATE 75 MG: 75 TABLET ORAL at 09:37

## 2018-03-09 RX ADMIN — PROMETHAZINE HYDROCHLORIDE 12.5 MG: 25 INJECTION INTRAMUSCULAR; INTRAVENOUS at 04:25

## 2018-03-09 RX ADMIN — ENOXAPARIN SODIUM 135 MG: 150 INJECTION SUBCUTANEOUS at 09:37

## 2018-03-09 RX ADMIN — PIPERACILLIN SODIUM,TAZOBACTAM SODIUM 3.38 G: 3; .375 INJECTION, POWDER, FOR SOLUTION INTRAVENOUS at 03:46

## 2018-03-09 RX ADMIN — Medication: at 16:28

## 2018-03-09 RX ADMIN — POTASSIUM CHLORIDE 10 MEQ: 200 INJECTION, SOLUTION INTRAVENOUS at 06:50

## 2018-03-09 RX ADMIN — I.V. FAT EMULSION 50 ML/HR: 20 EMULSION INTRAVENOUS at 21:21

## 2018-03-09 NOTE — PROGRESS NOTES
Problem: Falls - Risk of  Goal: *Absence of Falls  Document Marlene Fall Risk and appropriate interventions in the flowsheet.    Outcome: Progressing Towards Goal  Fall Risk Interventions:  Mobility Interventions: PT Consult for mobility concerns         Medication Interventions: Patient to call before getting OOB, Teach patient to arise slowly    Elimination Interventions: Call light in reach    History of Falls Interventions: Consult care management for discharge planning

## 2018-03-09 NOTE — PROGRESS NOTES
Bedside shift change report given to [address] [place] [address] (oncoming nurse) by Iram Olmedo RN (offgoing nurse). Report included the following information SBAR.

## 2018-03-09 NOTE — PROGRESS NOTES
Bedside shift change report given to [address] (oncoming nurse) by Mary Jane Cat (offgoing nurse). Report included the following information SBAR, MAR, Recent Results and Cardiac Rhythm NSR.

## 2018-03-09 NOTE — PROGRESS NOTES
TRANSFER - IN REPORT:    Verbal report received from PRO Garcia RN(name) on [address]  being received from [facility](unit) for routine progression of care      Report consisted of patients Situation, Background, Assessment and   Recommendations(SBAR). Information from the following report(s) SBAR was reviewed with the receiving nurse. Opportunity for questions and clarification was provided. Assessment completed upon patients arrival to unit and care assumed.

## 2018-03-09 NOTE — PROGRESS NOTES
Bedside shift change report given to Arlin Spencer RN (oncoming nurse) by Alvarado Clark RN (offgoing nurse). Report included the following information SBAR, Kardex, OR Summary, Procedure Summary, Intake/Output, MAR, Recent Results and Cardiac Rhythm sinus rhythm/sinus tachycardia.

## 2018-03-09 NOTE — PROGRESS NOTES
Pt woodruff leaking in the bed, pt refuses to turn or move and refuses to have her bed changed. Pt aware of importance of having a dry bed but still refuses to have her bed changed. Pt refuses to be turned.

## 2018-03-09 NOTE — CONSULTS
[address]    Calixto Walton  MR#: [id number]  : 1975  ACCOUNT #: [de-identified]   DATE OF SERVICE: 2018    CONSULTING PHYSICIAN:  Christel Huang DPM    REFERRING PHYSICIAN:  Dr. Kaden Chinchilla. REASON FOR CONSULTATION:  Evaluation and treatment of left foot pain and swelling, status post fall. HISTORY OF PRESENT ILLNESS:  The patient is a pleasant 53-year-old woman with a history of Crohn disease, DVT, lupus, Lyme disease, anxiety, seizure disorder and stroke, who presents for a fistula takedown. The patient had a total colectomy and ileostomy a number of years back and had developed a stricture. She had an endoscopy performed. Ultimately, she is presenting for a fistula takedown, which is being performed by General Surgery. Unfortunately, she had a fall a few days ago, which resulted in pain, bruising and swelling of her left lower extremity, as well as a bruise to her head. She had an x-ray of the left foot, which shows no evidence of fracture or dislocation. PAST MEDICAL HISTORY:  As above. SURGICAL HISTORY:  Significant for appendectomy, GYN surgery, multiple surgeries related to Crohn, abdominal surgery, incisional hernia repair. MEDICATIONS:  Lovenox, vitamin B12, heparin flush, Protonix 40 mg, Klonopin 0.5 mg, MS Contin 100 mg, morphine IR 30 mg, prednisone 20 mg, Zofran 4 mg, Plavix 75 mg, Humalog insulin, Tylenol p.r.n. ALLERGIES:  SULFA, DEMEROL, SOME AND TORADOL. REVIEW OF SYSTEMS:  Positive for left lower extremity pain. Negative for fever, chills or sweats. Positive for abdominal pain. PHYSICAL EXAMINATION:  VITAL SIGNS:  Temperature 97.7, pulse 112, blood pressure 104/65, respiratory rate 20, pulse oximetry 93% on room air. GENERAL:  The patient is awake, but relates pain and is communicative. LOWER EXTREMITIES:  Reveals bilateral lower extremity lymphedema. The patient has palpable pedal pulses.   She has edema, as well as ecchymosis down the left lower extremity, consistent with a traumatic injury to the left lower leg. There are focal areas where there appears to be some hematoma formation in the tissues. There is also a dorsal left foot edema and ecchymosis, as well as pain with palpation. The patient relates that there is pain with just mild touching of the left lower extremity. There are no open lesions, no signs of infection. Once again, x-rays of the left foot show no evidence of fracture or dislocation. ASSESSMENT AND PLAN:  Left lower extremity traumatic injury, with left foot pain, as well as lower leg pain. I explained to the patient and her family the need to obtain a left leg x-ray, as it appears that the injury may have been more to the leg than it is to the actual foot, and I have also recommended a Doppler ultrasound in light of the patient's previous history of deep venous thrombosis and the pain and swelling she is having in her left leg. At this point, there does not appear to be any fracture of the foot, but we will follow up on the x-rays and the duplex scan to decide if any further treatment is necessary.       FRANKIE Soliman / YOHANA  D: 03/09/2018 11:25     T: 03/09/2018 11:52  JOB #: [id number]

## 2018-03-09 NOTE — PROGRESS NOTES
Occupational Therapy Screening:  Services are not indicated at this time. [facility] based on results obtained during the nursing admission assessment. The patients chart was reviewed and the patient is not appropriate for a skilled therapy evaluation at this time. Please consult occupational therapy if any therapy needs arise. Thank you.     Irineo Coleman

## 2018-03-09 NOTE — PROGRESS NOTES
CM attended [facility] rounds this am.and met with patient in her room to follow up on transition of care planning. Patient TPN is supplied by [facility]--[phone]  (fax [phone]) and she is open to [facility]. Patient had wound vac placed yesterday-- If patient is discharged home with wound vac, CM will assist in completing paperwork for [employer] to secure home wound vac. Patient lives with her mother, Hakeem Parham [phone]. Mother is staying with patient in the hospital. Mom confirmed [facility] for [place],  And   HCP for TPN. Mom said patient has RW and wheelchair at home. .  There is a ramp at  the home. The home is 2 level but patient remains on the first level. .  Patient has a friend who stays with her during the day when mom is working. Therapy evaluated patient yesterday--rehab recommended. Mom wants patient to return home with [facility] services. CM will follow and talk with mom again about rehab if patient does not progress with therapy      CM sent referral to HCP and will provide new TPN orders when patient is ready for discharge and TPN order secured. Marycruz Crandall is the person for patient at HCP ([facility]). Per Rocio Hernandez with [facility], patient is open to the agency and CM will call agency when patient is medically ready for discharge and resumption orders--specific orders of what services are needed.

## 2018-03-09 NOTE — PROCEDURES
[facility]  *** FINAL REPORT ***    Name: Orion Breen  MRN: [id number]    Inpatient  : 15 Dec 1975  HIS Order #: [id number]  [address] [facility] Visit #: [id number]  Date: 09 Mar 2018    TYPE OF TEST: Peripheral Venous Testing    REASON FOR TEST  Pain in limb, Limb swelling    Left Leg:-  Deep venous thrombosis:           No  Superficial venous thrombosis:    No  Deep venous insufficiency:        Not examined  Superficial venous insufficiency: Not examined      INTERPRETATION/FINDINGS  PROCEDURE:  Color duplex ultrasound imaging of lower extremity veins. FINDINGS:       Right: The common femoral vein is patent and without evidence of  thrombus. Phasic flow is observed. This extremity was not otherwise  evaluated. Left:   The common femoral, deep femoral, femoral, popliteal,  posterior tibial and great saphenous are patent and without evidence  of thrombus;  each is fully compressible and there is no narrowing of  the flow channel on color Doppler imaging. The left peroneal vein was   not visualized. Phasic flow is observed in the common femoral vein. IMPRESSION:  No evidence of left lower extremity vein thrombosis where   seen. Due to limited visualization of the calf veins, isolated calf  vein thrombosis cannot be completely excluded. ADDITIONAL COMMENTS    I have personally reviewed the data relevant to the interpretation of  this  study.     TECHNOLOGIST: Obey So RVT  Signed: 2018 04:05 PM    PHYSICIAN: Drew Godinez MD  Signed: 2018 11:38 AM

## 2018-03-09 NOTE — PROGRESS NOTES
NUTRITION COMPLETE ASSESSMENT    RECOMMENDATIONS:   1. Cyclic TPN Goal:  6%AA A12 @ 68 mL/hr x 1 hour    @ 128 mL/hr x 13 hours    @ 68 mL/hr x last hour   + 20% lipids, 250 mL 3x/week   + Check BG 2 hours into infusion and one hour after infusion is completed    *May need to adjust total volumes per output. 2. Will likely need to add insulin to the above TPN   -- Note: she was previously receiving 18 units for 250 gm. If this ratio is desired, would recommend 19 units total or 11 units/L for above goal cyclic regimen. 3. Daily weights (standing when able-- if not able to stand, would obtain via lift scale and then rezero the bed)    4. Continue to monitor lytes and replete prn     Interventions/Plan:   Food/Nutrient Delivery:  TPN as primary source of nutrition    Assessment:   Reason for Assessment:   [x] Provider Consult  [x] BPA/MST Referral     Diet:  ice chips  Nutritionally Significant Medications: [x] Reviewed & Includes: phenergan q6 hours prn; potassium chloride 40 mEq twice/day; zofran q 4 hours prn; LR @ 25 mL/hr; humalog correction scale     Future TPN: 5%AA D20 @ 83 mL/hr + MVI, thiamine (100 mg), famotidine 40 mg, trace elements 3x/week + 20% lipids, 500 mL 3x/week    Subjective:  Pt complaining of swelling in her legs. She states, \"the TPN has made me gain so much weight, I feel like I'm swelling up. \"  Mom at the bedside and states her legs and hands appear to be more swollen since admission. The pt does appear more edematous overall than last time this RD saw her on 10/25/17. Pt also asking about eating. **Spoke with Isis ADAM at [facility] ([facility]). TPN management of pt was challenging at times. Pt and RD have a good outpatient rapport. Pt was admitting to emotional eating, which would then increase her fistula output and, in turn, require higher TPN volumes.   There were also a few occasions where the pt was not administering full TPN secondary to swelling. RD reported that CHO to insulin ratio was working well for her (18 units/250 gm). Per RD, the pt was gaining weight rapidly (up to 342# in December, but is now 304-306#). Will monitor trends. Objective:  Chart reviewed, discussed with RN and team during interdisciplinary rounds. Pt admitted for fistula takedown. PMHx: Crohn's, multiple abdominal surgeries including a total colectomy with end ileostomy, EC fistula, chronic TPN, morbid obesity, others noted. Pt is very familiar to this nutrition team from previous admissions. Pt POD2 exploratory lap, lysis of adhesions x 5 hours, small bowel fistula take down x 2 and wound vac placement. Future TPN to provide a daily average of 2183 kcal, 100 g protein in 1992 mL (2492 mL on lipid days)-- meeting 91% of estimated kcal and protein needs, respectively. Above recommendations for post-op cyclic TPN (as above) would provide a daily average of 1564 kcal, 108 g protein and 1800 mL (2050 mL on lipid days)-- meeting 100% and 98% of estimated kcal and protein needs, respectively. Wound vac in place as well. Most recent TPN orders from PTA (based on dose weight of 138 kg): Average Daily Kcal: 1362 kcal  Dextrose: 250 gm  Amino Acids: 110 gm  Lipids (SMOF): 50 gm (once/week)  MVI: 10 mL  Insulin: 18 units  Zinc: 0mg    Estimated Nutrition Needs:   Kcals/day: 1974 Kcals/day (8385-0733 kcal/day (11-15 kcal/kg))  Protein: 110 g (0.8 g/kg)  Fluid: 2000 ml (or per output)     Based On: Kcal/kg - specify (Comment)  Weight Used: Actual wt (137 kg (need updated wt))    Pt expected to meet estimated nutrient needs:  [x]   Yes (on TPN)    Nutrition Diagnosis:   1.  Altered GI function related to chronic EC fistula with drain as evidenced by need for chronic TPN (PTA)    Goals:     Pt to meet at least 90% of estimated needs via TPN over the next 5-7 days     Monitoring & Evaluation:    - Enteral/parenteral nutrition intake   - Weight/weight change Previous Nutrition Goals Met:  N/A  Previous Recommendations:      N/A    Education & Discharge Needs:   [x] None Identified   [] Identified and addressed    [x] Participated in care plan, discharge planning, and/or interdisciplinary rounds        Cultural, Yazdanism and ethnic food preferences identified:  NONE      Skin Integrity: []Intact  [x]Other: wound vac; s/p fistula takedown (previously had chronic EC fistula)  Edema: []None [x]Other: RLE, LLE 2+ pitting  Last BM: 3/7/18  Food Allergies: [x]None []Other    Anthropometrics:    Weight Loss Metrics 3/7/2018 3/5/2018 2/28/2018 2/27/2018 2/27/2018 2/26/2018 2/12/2018   Today's Wt 302 lb 304 lb 3.2 oz 306 lb 306 lb 8 oz - 307 lb 307 lb   BMI 51.84 kg/m2 52.22 kg/m2 52.52 kg/m2 52.61 kg/m2 - 52.7 kg/m2 52.7 kg/m2      Last 3 Recorded Weights in this Encounter    03/07/18 0851 03/07/18 0858   Weight: 138.8 kg (306 lb) 137 kg (302 lb)      Weight Source:  (Pt states, \"302.4# the day I came in\")  Height: 5' 4\" (162.6 cm),    Body mass index is 51.84 kg/(m^2). IBW : 54.4 kg (120 lb),    Usual Body Weight: 135.2 kg (298 lb) (1/2018),      Labs:    Lab Results   Component Value Date/Time    Sodium 142 03/09/2018 02:24 AM    Potassium 2.7 (LL) 03/09/2018 02:24 AM    Chloride 110 (H) 03/09/2018 02:24 AM    CO2 26 03/09/2018 02:24 AM    Glucose 147 (H) 03/09/2018 02:24 AM    BUN 14 03/09/2018 02:24 AM    Creatinine 0.76 03/09/2018 02:24 AM    Calcium 7.6 (L) 03/09/2018 02:24 AM    Magnesium 2.0 03/09/2018 02:24 AM    Phosphorus 1.5 (L) 03/09/2018 02:24 AM    Albumin 2.3 (L) 03/08/2018 03:11 AM     No results found for: HBA1C, HGBE8, YFD5PDLL, WWR2HOWA  Lab Results   Component Value Date/Time    Glucose 147 (H) 03/09/2018 02:24 AM    Glucose (POC) 96 03/07/2018 09:13 AM      Lab Results   Component Value Date/Time    ALT (SGPT) 179 (H) 03/08/2018 03:11 AM    AST (SGOT) 33 03/08/2018 03:11 AM    Alk.  phosphatase 80 03/08/2018 03:11 AM    Bilirubin, direct 0.1 07/07/2009 06:38 PM    Bilirubin, total 2.6 (H) 03/08/2018 03:11 AM      [address]

## 2018-03-09 NOTE — PROGRESS NOTES
TRANSFER - OUT REPORT:    Verbal report given to MAURI Sanders (name) on Ruth Shaw  being transferred to  (unit) for ordered procedure       Report consisted of patients Situation, Background, Assessment and   Recommendations(SBAR). Information from the following report(s) SBAR, Kardex, OR Summary, Procedure Summary, Intake/Output, MAR, Recent Results and Cardiac Rhythm sinus rhythm/sinus tachycardia was reviewed with the receiving nurse. Lines:   PICC Double Lumen 08/02/17 Right (Active)   Central Line Being Utilized Yes 3/8/2018  8:30 PM   Criteria for Appropriate Use Total parenteral nutrition 3/8/2018  8:30 PM   Site Assessment Clean, dry, & intact 3/8/2018  8:30 PM   Phlebitis Assessment 0 3/8/2018  8:30 PM   Infiltration Assessment 0 3/8/2018  8:30 PM   Dressing Status Clean, dry, & intact 3/8/2018  8:30 PM   Action Taken Open ports on tubing capped 3/8/2018  8:30 PM   Dressing Type Disk with Chlorhexadine gluconate (CHG) 3/8/2018  8:30 PM   Hub Color/Line Status Infusing 3/8/2018  8:30 PM   Positive Blood Return (Site #1) Yes 3/8/2018  8:30 PM   Hub Color/Line Status Infusing 3/8/2018  8:30 PM   Positive Blood Return (Site #2) Yes 3/8/2018  8:30 PM   Alcohol Cap Used Yes 3/8/2018  8:30 PM       Peripheral IV 03/07/18 Left External jugular (Active)   Site Assessment Clean, dry, & intact 3/8/2018  8:30 PM   Phlebitis Assessment 0 3/8/2018  8:30 PM   Infiltration Assessment 0 3/8/2018  8:30 PM   Dressing Status Clean, dry, & intact 3/8/2018  8:30 PM   Dressing Type Transparent 3/8/2018  8:30 PM   Hub Color/Line Status Capped 3/8/2018  8:30 PM   Action Taken Open ports on tubing capped 3/8/2018  8:30 PM   Alcohol Cap Used Yes 3/8/2018  8:30 PM        Opportunity for questions and clarification was provided.       Patient transported with:   Patient-specific medications from Pharmacy  Registered Nurse  Tech

## 2018-03-09 NOTE — PROGRESS NOTES
Ms. Jomar Galloway reports pain this AM.  Tm 99.3 HR: 102 BP: 110/72 Resp Rate: 20 98% sat on room air. Intake/Output Summary (Last 24 hours) at 03/09/18 0747  Last data filed at 03/08/18 2030   Gross per 24 hour   Intake                0 ml   Output              645 ml   Net             -645 ml   Exam: Cor: RRR. Lungs: Bilateral breath sounds. Clear to auscultation. Abd: Soft. Tender. Ileostomy viable. No output. Serosanguinous drain output.            Wound vac in place and holding a seal.  Labs:   Recent Results (from the past 12 hour(s))   METABOLIC PANEL, BASIC    Collection Time: 03/09/18  2:24 AM   Result Value Ref Range    Sodium 142 136 - 145 mmol/L    Potassium 2.7 (LL) 3.5 - 5.1 mmol/L    Chloride 110 (H) 97 - 108 mmol/L    CO2 26 21 - 32 mmol/L    Anion gap 6 5 - 15 mmol/L    Glucose 147 (H) 65 - 100 mg/dL    BUN 14 6 - 20 MG/DL    Creatinine 0.76 0.55 - 1.02 MG/DL    BUN/Creatinine ratio 18 12 - 20      GFR est AA >60 >60 ml/min/1.73m2    GFR est non-AA >60 >60 ml/min/1.73m2    Calcium 7.6 (L) 8.5 - 10.1 MG/DL   MAGNESIUM    Collection Time: 03/09/18  2:24 AM   Result Value Ref Range    Magnesium 2.0 1.6 - 2.4 mg/dL   PHOSPHORUS    Collection Time: 03/09/18  2:24 AM   Result Value Ref Range    Phosphorus 1.5 (L) 2.6 - 4.7 MG/DL   CBC WITH AUTOMATED DIFF    Collection Time: 03/09/18  2:24 AM   Result Value Ref Range    WBC 6.7 3.6 - 11.0 K/uL    RBC 2.51 (L) 3.80 - 5.20 M/uL    HGB 8.5 (L) 11.5 - 16.0 g/dL    HCT 26.2 (L) 35.0 - 47.0 %    .4 (H) 80.0 - 99.0 FL    MCH 33.9 26.0 - 34.0 PG    MCHC 32.4 30.0 - 36.5 g/dL    RDW 18.6 (H) 11.5 - 14.5 %    PLATELET 79 (L) 403 - 400 K/uL    MPV 10.5 8.9 - 12.9 FL    NRBC 1.3 (H) 0  WBC    ABSOLUTE NRBC 0.09 (H) 0.00 - 0.01 K/uL    NEUTROPHILS 67 32 - 75 %    BAND NEUTROPHILS 10 (H) 0 - 6 %    LYMPHOCYTES 16 12 - 49 %    MONOCYTES 4 (L) 5 - 13 %    EOSINOPHILS 1 0 - 7 %    BASOPHILS 0 0 - 1 %    MYELOCYTES 2 (H) 0 %    IMMATURE GRANULOCYTES 0 %    ABS. NEUTROPHILS 5.2 1.8 - 8.0 K/UL    ABS. LYMPHOCYTES 1.1 0.8 - 3.5 K/UL    ABS. MONOCYTES 0.3 0.0 - 1.0 K/UL    ABS. EOSINOPHILS 0.1 0.0 - 0.4 K/UL    ABS. BASOPHILS 0.0 0.0 - 0.1 K/UL    ABS. IMM. GRANS. 0.0 K/UL    DF MANUAL      RBC COMMENTS ANISOCYTOSIS  1+        RBC COMMENTS MACROCYTOSIS  1+        RBC COMMENTS OVALOCYTES  PRESENT        RBC COMMENTS POLYCHROMASIA  1+        RBC COMMENTS TEARDROP CELLS  PRESENT       Will renew TPN and Lipids. Continue NG decompression for now. Replace K 2.7 and Phos 1.5. Decrease IVF. Dilaudid PCA as ordered. Continue Lovenox/Plavix for now. Remove woodruff. Needs to be OOB - Physical Therapy following. Continue wound vac therapy. Will ask Wound Care nurses to see. Labs in AM.  Transfer to floor.

## 2018-03-09 NOTE — WOUND CARE
WOCN Note:     New consult placed by Dr. Aysha Medina for [facility] dressing. Chart shows:  Admitted for fistula takdown 3/7/18 by Dr. Fabienne James with [facility] placement in [address] [place] [address]; history of multiple abdominal surgeries and Crohns disease. Admitted from home. Assessment:   Patient is A&O x 4, continent and mobile. Bed: total care  Using PCA for pain. 1. Midline abdominal surgical wound = 25 x 10 x 6 cm  100% freshly excised subcutaneous tissue with three small areas of capillary oozing that did not stop with pressure. 500cc of bright red serosanguinous exudate in cannister. Discussed with Dr. Fabienne James over the phone and will use silver nitrate to touch the oozing sites and apply surgicel as needed. These products stopped the bleeding and I waited a few minutes prior to applying the [facility] and did not notice any more bleeding. 50 mmHg continuous suction achieved using 2 Mepitel One in base, 1 white sponge and 1 black sponge. There are 3 pieces of surgicel in base. Drain in LLQ and colostomy with pouch intact and little output. Stoma is moist & pink. Linear splits in fold under pannus - stoma powder given to RN to apply    Wound Recommendations:    Maintain VAC as ordered @ 50 mmHg continuous suction. Stoma powder under pannus daily and as needed for moisture. Skin Care & Pressure Relief Recommendations:  Minimize layers of linen/pads under patient to optimize support surface. Turn/reposition approximately every 2 hours and offload heels. Promote continence    Discussed above plan with patient & RNBo. Transition of Care: Plan to follow as needed while admitted to hospital with next [facility] dressing change on Tuesday.      YOLANDA AmaroN, RN, [facility]  Certified Wound, Ostomy, Continence Nurse  office [phone]  pager [phone] or call  to page

## 2018-03-09 NOTE — PROGRESS NOTES
Patient and her mother refusing for patient to be transported to vascular services via a stretcher. Vascular contacted, and agreed to complete the ultrasound at the patient's bedside but it would be at a later time than originally scheduled. This messaged relayed to the patient, understanding verbalized.

## 2018-03-09 NOTE — PROGRESS NOTES
NUTRITION       Attempting to obtain home TPN orders from [facility]). Attempted to contact Pharmacist, Pharm Tech and left a message with RD. Full assessment to follow.     [address]

## 2018-03-10 LAB
ANION GAP SERPL CALC-SCNC: 8 MMOL/L (ref 5–15)
BUN SERPL-MCNC: 10 MG/DL (ref 6–20)
BUN/CREAT SERPL: 17 (ref 12–20)
CALCIUM SERPL-MCNC: 7.4 MG/DL (ref 8.5–10.1)
CHLORIDE SERPL-SCNC: 112 MMOL/L (ref 97–108)
CO2 SERPL-SCNC: 25 MMOL/L (ref 21–32)
CREAT SERPL-MCNC: 0.58 MG/DL (ref 0.55–1.02)
GLUCOSE BLD STRIP.AUTO-MCNC: 123 MG/DL (ref 65–100)
GLUCOSE BLD STRIP.AUTO-MCNC: 142 MG/DL (ref 65–100)
GLUCOSE BLD STRIP.AUTO-MCNC: 154 MG/DL (ref 65–100)
GLUCOSE BLD STRIP.AUTO-MCNC: 161 MG/DL (ref 65–100)
GLUCOSE SERPL-MCNC: 137 MG/DL (ref 65–100)
MAGNESIUM SERPL-MCNC: 1.5 MG/DL (ref 1.6–2.4)
PHOSPHATE SERPL-MCNC: 1.7 MG/DL (ref 2.6–4.7)
POTASSIUM SERPL-SCNC: 3.4 MMOL/L (ref 3.5–5.1)
SERVICE CMNT-IMP: ABNORMAL
SODIUM SERPL-SCNC: 145 MMOL/L (ref 136–145)

## 2018-03-10 PROCEDURE — 74011636637 HC RX REV CODE- 636/637: Performed by: SURGERY

## 2018-03-10 PROCEDURE — 83735 ASSAY OF MAGNESIUM: CPT | Performed by: SURGERY

## 2018-03-10 PROCEDURE — 65210000002 HC RM PRIVATE GYN

## 2018-03-10 PROCEDURE — P9045 ALBUMIN (HUMAN), 5%, 250 ML: HCPCS | Performed by: SURGERY

## 2018-03-10 PROCEDURE — 74011250636 HC RX REV CODE- 250/636: Performed by: NURSE PRACTITIONER

## 2018-03-10 PROCEDURE — 74011250637 HC RX REV CODE- 250/637: Performed by: PHYSICIAN ASSISTANT

## 2018-03-10 PROCEDURE — 3331090001 HH PPS REVENUE CREDIT

## 2018-03-10 PROCEDURE — 74011250636 HC RX REV CODE- 250/636: Performed by: SURGERY

## 2018-03-10 PROCEDURE — 36415 COLL VENOUS BLD VENIPUNCTURE: CPT | Performed by: SURGERY

## 2018-03-10 PROCEDURE — 3331090002 HH PPS REVENUE DEBIT

## 2018-03-10 PROCEDURE — 80048 BASIC METABOLIC PNL TOTAL CA: CPT | Performed by: SURGERY

## 2018-03-10 PROCEDURE — 74011000250 HC RX REV CODE- 250: Performed by: SURGERY

## 2018-03-10 PROCEDURE — 84100 ASSAY OF PHOSPHORUS: CPT | Performed by: SURGERY

## 2018-03-10 PROCEDURE — 82962 GLUCOSE BLOOD TEST: CPT

## 2018-03-10 PROCEDURE — 74011250637 HC RX REV CODE- 250/637: Performed by: SURGERY

## 2018-03-10 PROCEDURE — 74011000258 HC RX REV CODE- 258: Performed by: SURGERY

## 2018-03-10 RX ORDER — ACETAMINOPHEN 10 MG/ML
1000 INJECTION, SOLUTION INTRAVENOUS EVERY 8 HOURS
Status: COMPLETED | OUTPATIENT
Start: 2018-03-10 | End: 2018-03-15

## 2018-03-10 RX ORDER — MAGNESIUM SULFATE HEPTAHYDRATE 40 MG/ML
2 INJECTION, SOLUTION INTRAVENOUS ONCE
Status: COMPLETED | OUTPATIENT
Start: 2018-03-10 | End: 2018-04-11

## 2018-03-10 RX ORDER — ALBUMIN HUMAN 50 G/1000ML
25 SOLUTION INTRAVENOUS EVERY 6 HOURS
Status: COMPLETED | OUTPATIENT
Start: 2018-03-10 | End: 2018-03-16

## 2018-03-10 RX ORDER — FENTANYL 100 UG/H
1 PATCH TRANSDERMAL
Status: DISCONTINUED | OUTPATIENT
Start: 2018-03-10 | End: 2018-03-12

## 2018-03-10 RX ADMIN — ACETAMINOPHEN 1000 MG: 10 INJECTION, SOLUTION INTRAVENOUS at 19:44

## 2018-03-10 RX ADMIN — ENOXAPARIN SODIUM 135 MG: 150 INJECTION SUBCUTANEOUS at 10:41

## 2018-03-10 RX ADMIN — FAMOTIDINE: 10 INJECTION, SOLUTION INTRAVENOUS at 20:03

## 2018-03-10 RX ADMIN — DRONABINOL 2.5 MG: 2.5 CAPSULE ORAL at 10:41

## 2018-03-10 RX ADMIN — ONDANSETRON HYDROCHLORIDE 4 MG: 2 INJECTION INTRAMUSCULAR; INTRAVENOUS at 19:44

## 2018-03-10 RX ADMIN — Medication 10 ML: at 10:48

## 2018-03-10 RX ADMIN — INSULIN LISPRO 3 UNITS: 100 INJECTION, SOLUTION INTRAVENOUS; SUBCUTANEOUS at 01:06

## 2018-03-10 RX ADMIN — Medication 10 ML: at 13:48

## 2018-03-10 RX ADMIN — Medication 1 SPRAY: at 10:44

## 2018-03-10 RX ADMIN — INSULIN LISPRO 3 UNITS: 100 INJECTION, SOLUTION INTRAVENOUS; SUBCUTANEOUS at 06:01

## 2018-03-10 RX ADMIN — MAGNESIUM SULFATE HEPTAHYDRATE 2 G: 40 INJECTION, SOLUTION INTRAVENOUS at 20:00

## 2018-03-10 RX ADMIN — ACETAMINOPHEN 1000 MG: 10 INJECTION, SOLUTION INTRAVENOUS at 10:30

## 2018-03-10 RX ADMIN — Medication: at 21:28

## 2018-03-10 RX ADMIN — ALBUMIN (HUMAN) 25 G: 12.5 INJECTION, SOLUTION INTRAVENOUS at 19:44

## 2018-03-10 RX ADMIN — ENOXAPARIN SODIUM 135 MG: 150 INJECTION SUBCUTANEOUS at 21:08

## 2018-03-10 RX ADMIN — ONDANSETRON HYDROCHLORIDE 4 MG: 2 INJECTION INTRAMUSCULAR; INTRAVENOUS at 13:38

## 2018-03-10 RX ADMIN — INSULIN LISPRO 2 UNITS: 100 INJECTION, SOLUTION INTRAVENOUS; SUBCUTANEOUS at 12:00

## 2018-03-10 RX ADMIN — ONDANSETRON HYDROCHLORIDE 4 MG: 2 INJECTION INTRAMUSCULAR; INTRAVENOUS at 06:02

## 2018-03-10 RX ADMIN — Medication 10 ML: at 22:00

## 2018-03-10 RX ADMIN — DRONABINOL 2.5 MG: 2.5 CAPSULE ORAL at 19:44

## 2018-03-10 RX ADMIN — Medication: at 10:35

## 2018-03-10 RX ADMIN — SODIUM CHLORIDE 500 ML: 9 INJECTION, SOLUTION INTRAVENOUS at 13:00

## 2018-03-10 RX ADMIN — ALBUMIN (HUMAN) 25 G: 12.5 INJECTION, SOLUTION INTRAVENOUS at 13:50

## 2018-03-10 RX ADMIN — CLOPIDOGREL BISULFATE 75 MG: 75 TABLET ORAL at 10:41

## 2018-03-10 NOTE — PROGRESS NOTES
Progress Note    Patient: Deion Griggs MRN: [id number]  SSN: [id number]    YOB: 1975  Age: 38 y.o. Sex: female      Admit Date: 3/7/2018    3 Days Post-Op    Procedure:  Procedure(s):  EXPLORATORY LAPAROTOMY, LYSIS OF ADHESION X 5HOURS, SMALL BOWEL FISTULA TAKE DOWN X 2 AND WOUND VAC PLACEMENT    Subjective:     No acute surgical issues. Pt reported mild nausea but no vomiting. Pt stated pain is not well controlled. Objective:     Visit Vitals    /75 (BP 1 Location: Left arm, BP Patient Position: At rest)    Pulse (!) 116    Temp 99.6 °F (37.6 °C)    Resp 20    Ht 5' 4\" (1.626 m)    Wt 302 lb (137 kg)    SpO2 97%    BMI 51.84 kg/m2       Temp (24hrs), Av.2 °F (37.3 °C), Min:98.2 °F (36.8 °C), Max:100.1 °F (37.8 °C)        Physical Exam:    Gen:  NAD  Pulm:  Unlabored  Abd:  S/ND/appropriate TTP  Wound vac:  C/D/I    Recent Results (from the past 24 hour(s))   GLUCOSE, POC    Collection Time: 18  6:41 PM   Result Value Ref Range    Glucose (POC) 154 (H) 65 - 100 mg/dL    Performed by Tj Lisa    GLUCOSE, POC    Collection Time: 03/10/18 12:13 AM   Result Value Ref Range    Glucose (POC) 142 (H) 65 - 100 mg/dL    Performed by Xi Blackburn (PCT)    GLUCOSE, POC    Collection Time: 03/10/18  5:41 AM   Result Value Ref Range    Glucose (POC) 161 (H) 65 - 100 mg/dL    Performed by Xi Blackburn (PCT)        Assessment:     Hospital Problems  Date Reviewed: 10/27/2017          Codes Class Noted POA    Small bowel fistula ICD-10-CM: K63.2  ICD-9-CM: 569.81  3/7/2018 Unknown              Plan/Recommendations/Medical Decision Making:     - Renew TPN  - Continue antibiotic  - Pain control with fentanyl patch and PCA  - Labs in am  - Out of bed.   Encourage ambulation  - Consult Palliative to assist with pain management on Monday     Signed By: Gunnar De Jesus MD     March 10, 2018

## 2018-03-10 NOTE — OP NOTES
[facility]  ACUTE CARE OP NOTE    Asher Soliz  MR#: [id number]  : 1975  ACCOUNT #: [de-identified]   DATE OF SERVICE: 2018    PREOPERATIVE DIAGNOSIS:  Small bowel enteroatmospheric fistula. POSTOPERATIVE DIAGNOSIS:  Small bowel enteroatmospheric fistula. PROCEDURE PERFORMED  1. Exploratory laparotomy with lysis of adhesions for 4 hours, small bowel fistula takedown x2.  2.  Wound VAC placement. PRIMARY SURGEON:  Lesly Catherine MD.    ANESTHESIA:  General endotracheal.    ESTIMATED BLOOD LOSS:  200 mL. SPECIMEN:  1. Proximal small bowel. 2.  Distal small bowel fistula. 3.  Abdominal wall implant. FINDINGS:  There were dense adhesions throughout the abdomen and pelvis. There were 2 small bowel fistulae. The proximal small bowel fistula was approximately at 40 cm from the ligament of Treitz. Approximately 8 cm of the small bowel was resected. The second fistula was in the distal small bowel. However, I am unclear whether this is from the ileostomy. Approximately 8 cm of the small bowel was resected. COMPLICATIONS:  None. DRAINS AND TUBES:  A 19-Slovak Haider drain was placed in the pelvis and externalized to the left lower quadrant. INDICATION FOR OPERATION:  The patient is a 79-year-old woman with a history of Crohn disease who underwent a total abdominal colectomy with an end ileostomy. The patient developed a stricture in the small bowel and had a capsule endoscopy performed. The pill got stuck, so she had an enteroscopy and developed a small bowel perforation as a result. She underwent abdominal exploration and repair of small bowel perforation. Subsequently, the patient developed a superior mesenteric artery thrombosis and had to be taken back for exploration. In this operation, she had small bowel injury and developed fistula. She was then placed on TPN and presents today for fistula takedown.     PROCEDURE IN DETAIL:  After informed consent was obtained from the patient, the patient was taken to the operating room and placed in supine position on operating table. She underwent general anesthesia with endotracheal intubation without any complication. Reilly catheter was inserted. The patient's abdomen and pelvis was then prepped and draped in the usual sterile surgical fashion. A surgical timeout was then performed. Zosyn preoperative antibiotic was administered 30 minutes prior to skin incision. After the timeout was performed, an infraumbilical incision was made with an 11 blade scalpel. Dissection was taken down using a combination of blunt dissection and electrocautery. The peritoneal cavity was entered below the enteroatmospheric site using Metzenbaum scissors. There were dense adhesions throughout the abdomen and pelvis. With meticulous dissection, we were able to work our way up from below to the wound bed. We spent approximately 4 hours taking down the dense adhesions and the small bowel fistula. Once the adhesions were adequately free for us to do our small bowel anastomosis, we then used an Endo-ELIO purple load to divide the small bowel. On the proximal fistula, we removed approximately 12 cm of small bowel. This was approximately 40 cm from the ligament of Treitz. After the small bowel had been divided with an Endo-ELIO purple load, we then used a ligature to divide the mesentery. The patient did have some oozing and bleeding from the site. This was controlled with a figure-of-eight 3-0 silk sutures. The small bowel was then reanastomosed in a 2-layer handsewn side-to-side fashion, the inner layer consisted of 3-0 Vicryl in a running locking fashion and the outer layer was closed with 3-0 silk Lembert sutures. After the proximal small bowel fistula was resected and reanastomosed, we then focused attention on the distal small bowel fistula. Approximately 8 cm of small bowel fistula was resected on the distal side. We were unclear how far our distance from the ileostomy itself. The small bowel was resected with Endo-ELIO purple load, it was then again reanastomosed with a handsewn 2-layered fashion. The inner layer consisting of 3-0 Vicryl sutures in a running interlocking fashion. The outer layer consisted of a 3-0 silk suture in a Lembert fashion. After the anastomosis had been completed, we then tested to make sure it was patent. There was no evidence of leakage. Tisseel was then applied over each anastomotic site. We then copiously irrigated the abdomen and pelvis. A 19-Icelandic Haider drain was then placed in the pelvis and externalized in the left lower quadrant. This was secured with 2-0 nylon. The abdomen and pelvis were then copiously irrigated with bacitracin and saline. Once this was completed, we then proceeded to close the abdominal midline fascia with #1 looped PDS in a running fashion. Once the fascia was closed, the wound VAC appliance was brought into the operative field. It was cut to the size of the wound. It was then applied with adherent dressing. The wound VAC was then hooked up to 125 mmHg. The patient tolerated the procedure well. There were no complications associated with the operation. Dr. Margi Connors, the attending surgeon, was present during the entirety of the case. All lap, needle, and instrument counts were correct x2. The patient was successfully extubated and was then transported to PACU in stable condition.       MD DIMAS Fregoso / YOHANA  D: 03/09/2018 20:26     T: 03/09/2018 21:13  JOB #: [id number]

## 2018-03-10 NOTE — PROGRESS NOTES
Bedside and Verbal shift change report given to Sherlyn Wheat (oncoming nurse) by Vernon You (offgoing nurse). Report included the following information SBAR, Kardex, OR Summary, Procedure Summary, Intake/Output, MAR and Recent Results.

## 2018-03-11 LAB
ABO + RH BLD: NORMAL
ANION GAP SERPL CALC-SCNC: 7 MMOL/L (ref 5–15)
ANTIGENS PRESENT BLD: NORMAL
ANTIGENS PRESENT RBC DONR: NORMAL
ANTIGENS PRESENT RBC DONR: NORMAL
BASOPHILS # BLD: 0 K/UL (ref 0–0.1)
BASOPHILS NFR BLD: 0 % (ref 0–1)
BLD PROD TYP BPU: NORMAL
BLD PROD TYP BPU: NORMAL
BLOOD BANK CMNT PATIENT-IMP: NORMAL
BLOOD GROUP ANTIBODIES SERPL: NORMAL
BPU ID: NORMAL
BPU ID: NORMAL
BUN SERPL-MCNC: 8 MG/DL (ref 6–20)
BUN/CREAT SERPL: 14 (ref 12–20)
CALCIUM SERPL-MCNC: 7.4 MG/DL (ref 8.5–10.1)
CHLORIDE SERPL-SCNC: 113 MMOL/L (ref 97–108)
CO2 SERPL-SCNC: 24 MMOL/L (ref 21–32)
CREAT SERPL-MCNC: 0.57 MG/DL (ref 0.55–1.02)
CROSSMATCH RESULT,%XM: NORMAL
CROSSMATCH RESULT,%XM: NORMAL
DIFFERENTIAL METHOD BLD: ABNORMAL
EOSINOPHIL # BLD: 0.1 K/UL (ref 0–0.4)
EOSINOPHIL NFR BLD: 1 % (ref 0–7)
ERYTHROCYTE [DISTWIDTH] IN BLOOD BY AUTOMATED COUNT: 18.3 % (ref 11.5–14.5)
ERYTHROCYTE [DISTWIDTH] IN BLOOD BY AUTOMATED COUNT: 20.4 % (ref 11.5–14.5)
GLUCOSE BLD STRIP.AUTO-MCNC: 131 MG/DL (ref 65–100)
GLUCOSE BLD STRIP.AUTO-MCNC: 133 MG/DL (ref 65–100)
GLUCOSE BLD STRIP.AUTO-MCNC: 135 MG/DL (ref 65–100)
GLUCOSE BLD STRIP.AUTO-MCNC: 148 MG/DL (ref 65–100)
GLUCOSE SERPL-MCNC: 117 MG/DL (ref 65–100)
HCT VFR BLD AUTO: 15.7 % (ref 35–47)
HCT VFR BLD AUTO: 22.2 % (ref 35–47)
HGB BLD-MCNC: 5.2 G/DL (ref 11.5–16)
HGB BLD-MCNC: 7.5 G/DL (ref 11.5–16)
IMM GRANULOCYTES # BLD: 0 K/UL
IMM GRANULOCYTES NFR BLD AUTO: 0 %
LYMPHOCYTES # BLD: 1.3 K/UL (ref 0.8–3.5)
LYMPHOCYTES NFR BLD: 25 % (ref 12–49)
MAGNESIUM SERPL-MCNC: 2.1 MG/DL (ref 1.6–2.4)
MCH RBC QN AUTO: 32.8 PG (ref 26–34)
MCH RBC QN AUTO: 34.7 PG (ref 26–34)
MCHC RBC AUTO-ENTMCNC: 33.1 G/DL (ref 30–36.5)
MCHC RBC AUTO-ENTMCNC: 33.8 G/DL (ref 30–36.5)
MCV RBC AUTO: 104.7 FL (ref 80–99)
MCV RBC AUTO: 96.9 FL (ref 80–99)
METAMYELOCYTES NFR BLD MANUAL: 2 %
MONOCYTES # BLD: 0.3 K/UL (ref 0–1)
MONOCYTES NFR BLD: 5 % (ref 5–13)
MYELOCYTES NFR BLD MANUAL: 2 %
NEUTS BAND NFR BLD MANUAL: 1 % (ref 0–6)
NEUTS SEG # BLD: 3.3 K/UL (ref 1.8–8)
NEUTS SEG NFR BLD: 64 % (ref 32–75)
NRBC # BLD: 0.09 K/UL (ref 0–0.01)
NRBC # BLD: 0.16 K/UL (ref 0–0.01)
NRBC BLD-RTO: 1.8 PER 100 WBC
NRBC BLD-RTO: 2.1 PER 100 WBC
PHOSPHATE SERPL-MCNC: 2.2 MG/DL (ref 2.6–4.7)
PLATELET # BLD AUTO: 84 K/UL (ref 150–400)
PLATELET # BLD AUTO: 86 K/UL (ref 150–400)
PMV BLD AUTO: 10.7 FL (ref 8.9–12.9)
PMV BLD AUTO: 11 FL (ref 8.9–12.9)
POTASSIUM SERPL-SCNC: 3.4 MMOL/L (ref 3.5–5.1)
RBC # BLD AUTO: 1.5 M/UL (ref 3.8–5.2)
RBC # BLD AUTO: 2.29 M/UL (ref 3.8–5.2)
RBC MORPH BLD: ABNORMAL
SERVICE CMNT-IMP: ABNORMAL
SODIUM SERPL-SCNC: 144 MMOL/L (ref 136–145)
SPECIMEN EXP DATE BLD: NORMAL
STATUS OF UNIT,%ST: NORMAL
STATUS OF UNIT,%ST: NORMAL
UNIT DIVISION, %UDIV: 0
UNIT DIVISION, %UDIV: 0
WBC # BLD AUTO: 5.1 K/UL (ref 3.6–11)
WBC # BLD AUTO: 7.5 K/UL (ref 3.6–11)

## 2018-03-11 PROCEDURE — 74011250636 HC RX REV CODE- 250/636: Performed by: SURGERY

## 2018-03-11 PROCEDURE — 83735 ASSAY OF MAGNESIUM: CPT | Performed by: SURGERY

## 2018-03-11 PROCEDURE — 36430 TRANSFUSION BLD/BLD COMPNT: CPT

## 2018-03-11 PROCEDURE — P9016 RBC LEUKOCYTES REDUCED: HCPCS | Performed by: SURGERY

## 2018-03-11 PROCEDURE — 30233N1 TRANSFUSION OF NONAUTOLOGOUS RED BLOOD CELLS INTO PERIPHERAL VEIN, PERCUTANEOUS APPROACH: ICD-10-PCS | Performed by: SURGERY

## 2018-03-11 PROCEDURE — 86900 BLOOD TYPING SEROLOGIC ABO: CPT | Performed by: SURGERY

## 2018-03-11 PROCEDURE — 85025 COMPLETE CBC W/AUTO DIFF WBC: CPT | Performed by: SURGERY

## 2018-03-11 PROCEDURE — P9045 ALBUMIN (HUMAN), 5%, 250 ML: HCPCS | Performed by: SURGERY

## 2018-03-11 PROCEDURE — 36415 COLL VENOUS BLD VENIPUNCTURE: CPT | Performed by: SURGERY

## 2018-03-11 PROCEDURE — 74011636637 HC RX REV CODE- 636/637: Performed by: SURGERY

## 2018-03-11 PROCEDURE — 74011000258 HC RX REV CODE- 258: Performed by: SURGERY

## 2018-03-11 PROCEDURE — 84100 ASSAY OF PHOSPHORUS: CPT | Performed by: SURGERY

## 2018-03-11 PROCEDURE — 80048 BASIC METABOLIC PNL TOTAL CA: CPT | Performed by: SURGERY

## 2018-03-11 PROCEDURE — 3331090002 HH PPS REVENUE DEBIT

## 2018-03-11 PROCEDURE — 65660000000 HC RM CCU STEPDOWN

## 2018-03-11 PROCEDURE — 3331090001 HH PPS REVENUE CREDIT

## 2018-03-11 PROCEDURE — 86920 COMPATIBILITY TEST SPIN: CPT | Performed by: SURGERY

## 2018-03-11 PROCEDURE — 86921 COMPATIBILITY TEST INCUBATE: CPT | Performed by: SURGERY

## 2018-03-11 PROCEDURE — 36592 COLLECT BLOOD FROM PICC: CPT

## 2018-03-11 PROCEDURE — 82962 GLUCOSE BLOOD TEST: CPT

## 2018-03-11 PROCEDURE — 74011250637 HC RX REV CODE- 250/637: Performed by: PHYSICIAN ASSISTANT

## 2018-03-11 PROCEDURE — 74011000250 HC RX REV CODE- 250: Performed by: SURGERY

## 2018-03-11 PROCEDURE — 74011250636 HC RX REV CODE- 250/636: Performed by: NURSE PRACTITIONER

## 2018-03-11 PROCEDURE — 85027 COMPLETE CBC AUTOMATED: CPT | Performed by: SURGERY

## 2018-03-11 PROCEDURE — 86922 COMPATIBILITY TEST ANTIGLOB: CPT | Performed by: SURGERY

## 2018-03-11 PROCEDURE — 74011250637 HC RX REV CODE- 250/637: Performed by: NURSE PRACTITIONER

## 2018-03-11 RX ORDER — SODIUM CHLORIDE 9 MG/ML
250 INJECTION, SOLUTION INTRAVENOUS AS NEEDED
Status: DISCONTINUED | OUTPATIENT
Start: 2018-03-11 | End: 2018-04-02

## 2018-03-11 RX ADMIN — FAMOTIDINE: 10 INJECTION, SOLUTION INTRAVENOUS at 19:49

## 2018-03-11 RX ADMIN — LORAZEPAM 1 MG: 2 INJECTION INTRAMUSCULAR; INTRAVENOUS at 22:21

## 2018-03-11 RX ADMIN — PROMETHAZINE HYDROCHLORIDE 12.5 MG: 25 INJECTION INTRAMUSCULAR; INTRAVENOUS at 23:19

## 2018-03-11 RX ADMIN — HYDROMORPHONE HYDROCHLORIDE 2 MG: 1 INJECTION, SOLUTION INTRAMUSCULAR; INTRAVENOUS; SUBCUTANEOUS at 15:43

## 2018-03-11 RX ADMIN — Medication: at 20:25

## 2018-03-11 RX ADMIN — ONDANSETRON HYDROCHLORIDE 4 MG: 2 INJECTION INTRAMUSCULAR; INTRAVENOUS at 16:01

## 2018-03-11 RX ADMIN — DRONABINOL 2.5 MG: 2.5 CAPSULE ORAL at 18:44

## 2018-03-11 RX ADMIN — Medication 10 ML: at 15:44

## 2018-03-11 RX ADMIN — ACETAMINOPHEN 1000 MG: 10 INJECTION, SOLUTION INTRAVENOUS at 10:59

## 2018-03-11 RX ADMIN — HYDROMORPHONE HYDROCHLORIDE 2 MG: 1 INJECTION, SOLUTION INTRAMUSCULAR; INTRAVENOUS; SUBCUTANEOUS at 22:22

## 2018-03-11 RX ADMIN — ACETAMINOPHEN 1000 MG: 10 INJECTION, SOLUTION INTRAVENOUS at 03:57

## 2018-03-11 RX ADMIN — Medication 10 ML: at 12:04

## 2018-03-11 RX ADMIN — INSULIN LISPRO 3 UNITS: 100 INJECTION, SOLUTION INTRAVENOUS; SUBCUTANEOUS at 06:13

## 2018-03-11 RX ADMIN — ONDANSETRON HYDROCHLORIDE 4 MG: 2 INJECTION INTRAMUSCULAR; INTRAVENOUS at 04:32

## 2018-03-11 RX ADMIN — DRONABINOL 2.5 MG: 2.5 CAPSULE ORAL at 11:58

## 2018-03-11 RX ADMIN — Medication 10 ML: at 22:22

## 2018-03-11 RX ADMIN — HYDROMORPHONE HYDROCHLORIDE 2 MG: 1 INJECTION, SOLUTION INTRAMUSCULAR; INTRAVENOUS; SUBCUTANEOUS at 10:57

## 2018-03-11 RX ADMIN — ALBUMIN (HUMAN) 25 G: 12.5 INJECTION, SOLUTION INTRAVENOUS at 03:55

## 2018-03-11 RX ADMIN — Medication: at 11:59

## 2018-03-11 RX ADMIN — ONDANSETRON HYDROCHLORIDE 4 MG: 2 INJECTION INTRAMUSCULAR; INTRAVENOUS at 19:44

## 2018-03-11 NOTE — PROGRESS NOTES
TRANSFER - OUT REPORT:    Verbal report given to Romario RN(name) on Faye Calvillo  being transferred to [facility](unit) for change in patient condition(hgb 5.2 BP trending down)       Report consisted of patients Situation, Background, Assessment and   Recommendations(SBAR). Information from the following report(s) SBAR, Kardex, OR Summary, Procedure Summary, Intake/Output, MAR and Recent Results was reviewed with the receiving nurse. Opportunity for questions and clarification was provided.       Patient transported with:  Nurse Safia Santos RN, Kaylen RN [address] [place] [address]

## 2018-03-11 NOTE — PROGRESS NOTES
RN called early this am to report some bleeding from wound vac. Wound vac was then taken off suction and abdominal binder was placed. Hemoglobin came back 5.2 this am.  Pt seen and examined. Wound vac taken down. No evidence of active surgical bleeding from wound bed just some oozing from Plavix. Wound packed with saline moistened Kerlix. Will hold Plavix and transfuse 2 units PRBC. Pt is hemodynamically stable but somewhat tachycardic and SBP trending down. Will transfer to [facility] for closer monitoring.

## 2018-03-11 NOTE — PROGRESS NOTES
Critical labs Hbg 5.2 Hct 15.7 MD Urban notified. New orders to hold am dose of Plavix and 2 units PRBC.

## 2018-03-11 NOTE — PROGRESS NOTES
Progress Note    Patient: Lorna Hernandez MRN: [id number]  SSN: [id number]    YOB: 1975  Age: 38 y.o. Sex: female      Admit Date: 3/7/2018    4 Days Post-Op    Procedure:  Procedure(s):  EXPLORATORY LAPAROTOMY, LYSIS OF ADHESION X 5HOURS, SMALL BOWEL FISTULA TAKE DOWN X 2 AND WOUND VAC PLACEMENT    Subjective:     No acute surgical issues. Pt had drop in hemoglobin this am.  Pt noted to have blood in wound vac cannister so wound vac was taken down. There was some oozing from wound bed but no surgical bleeding.   2 units PRBC provided and pt was transferred to [facility]    Objective:     Visit Vitals    /58 (BP 1 Location: Left arm, BP Patient Position: At rest)    Pulse (!) 107    Temp 98.2 °F (36.8 °C)    Resp 20    Ht 5' 4\" (1.626 m)    Wt 328 lb 11.3 oz (149.1 kg)    SpO2 100%    BMI 56.42 kg/m2       Temp (24hrs), Av.5 °F (36.9 °C), Min:97.9 °F (36.6 °C), Max:99 °F (37.2 °C)        Physical Exam:    Gen:  NAD  Pulm:  Unlabored  Abd:  S/ND/appropriate TTP  Wound with serosanguinous drainage    Recent Results (from the past 24 hour(s))   METABOLIC PANEL, BASIC    Collection Time: 03/10/18 11:05 AM   Result Value Ref Range    Sodium 145 136 - 145 mmol/L    Potassium 3.4 (L) 3.5 - 5.1 mmol/L    Chloride 112 (H) 97 - 108 mmol/L    CO2 25 21 - 32 mmol/L    Anion gap 8 5 - 15 mmol/L    Glucose 137 (H) 65 - 100 mg/dL    BUN 10 6 - 20 MG/DL    Creatinine 0.58 0.55 - 1.02 MG/DL    BUN/Creatinine ratio 17 12 - 20      GFR est AA >60 >60 ml/min/1.73m2    GFR est non-AA >60 >60 ml/min/1.73m2    Calcium 7.4 (L) 8.5 - 10.1 MG/DL   MAGNESIUM    Collection Time: 03/10/18 11:05 AM   Result Value Ref Range    Magnesium 1.5 (L) 1.6 - 2.4 mg/dL   PHOSPHORUS    Collection Time: 03/10/18 11:05 AM   Result Value Ref Range    Phosphorus 1.7 (L) 2.6 - 4.7 MG/DL   GLUCOSE, POC    Collection Time: 03/10/18  1:36 PM   Result Value Ref Range    Glucose (POC) 154 (H) 65 - 100 mg/dL    Performed by [facility] MEET    GLUCOSE, POC    Collection Time: 03/10/18  4:20 PM   Result Value Ref Range    Glucose (POC) 123 (H) 65 - 100 mg/dL    Performed by EFREM DEL CID    GLUCOSE, POC    Collection Time: 03/11/18 12:09 AM   Result Value Ref Range    Glucose (POC) 133 (H) 65 - 100 mg/dL    Performed by Rhett Black (PCT)    METABOLIC PANEL, BASIC    Collection Time: 03/11/18  3:55 AM   Result Value Ref Range    Sodium 144 136 - 145 mmol/L    Potassium 3.4 (L) 3.5 - 5.1 mmol/L    Chloride 113 (H) 97 - 108 mmol/L    CO2 24 21 - 32 mmol/L    Anion gap 7 5 - 15 mmol/L    Glucose 117 (H) 65 - 100 mg/dL    BUN 8 6 - 20 MG/DL    Creatinine 0.57 0.55 - 1.02 MG/DL    BUN/Creatinine ratio 14 12 - 20      GFR est AA >60 >60 ml/min/1.73m2    GFR est non-AA >60 >60 ml/min/1.73m2    Calcium 7.4 (L) 8.5 - 10.1 MG/DL   MAGNESIUM    Collection Time: 03/11/18  3:55 AM   Result Value Ref Range    Magnesium 2.1 1.6 - 2.4 mg/dL   PHOSPHORUS    Collection Time: 03/11/18  3:55 AM   Result Value Ref Range    Phosphorus 2.2 (L) 2.6 - 4.7 MG/DL   CBC WITH AUTOMATED DIFF    Collection Time: 03/11/18  4:36 AM   Result Value Ref Range    WBC 5.1 3.6 - 11.0 K/uL    RBC 1.50 (L) 3.80 - 5.20 M/uL    HGB 5.2 (LL) 11.5 - 16.0 g/dL    HCT 15.7 (LL) 35.0 - 47.0 %    .7 (H) 80.0 - 99.0 FL    MCH 34.7 (H) 26.0 - 34.0 PG    MCHC 33.1 30.0 - 36.5 g/dL    RDW 18.3 (H) 11.5 - 14.5 %    PLATELET 84 (L) 492 - 400 K/uL    MPV 10.7 8.9 - 12.9 FL    NRBC 1.8 (H) 0  WBC    ABSOLUTE NRBC 0.09 (H) 0.00 - 0.01 K/uL    NEUTROPHILS 64 32 - 75 %    BAND NEUTROPHILS 1 0 - 6 %    LYMPHOCYTES 25 12 - 49 %    MONOCYTES 5 5 - 13 %    EOSINOPHILS 1 0 - 7 %    BASOPHILS 0 0 - 1 %    METAMYELOCYTES 2 (H) 0 %    MYELOCYTES 2 (H) 0 %    IMMATURE GRANULOCYTES 0 %    ABS. NEUTROPHILS 3.3 1.8 - 8.0 K/UL    ABS. LYMPHOCYTES 1.3 0.8 - 3.5 K/UL    ABS. MONOCYTES 0.3 0.0 - 1.0 K/UL    ABS. EOSINOPHILS 0.1 0.0 - 0.4 K/UL    ABS. BASOPHILS 0.0 0.0 - 0.1 K/UL    ABS. IMM.  GRANS. 0.0 K/UL DF MANUAL      RBC COMMENTS ANISOCYTOSIS  1+        RBC COMMENTS TEARDROP CELLS  PRESENT        RBC COMMENTS POLYCHROMASIA  1+       GLUCOSE, POC    Collection Time: 03/11/18  5:52 AM   Result Value Ref Range    Glucose (POC) 148 (H) 65 - 100 mg/dL    Performed by Pipe Aguilar (PCT)    TYPE + CROSSMATCH    Collection Time: 03/11/18  5:58 AM   Result Value Ref Range    Crossmatch Expiration 03/14/2018     ABO/Rh(D) B POSITIVE     Antibody screen NEG     Unit number [id number]     Blood component type RC LR     Unit division 00     Status of unit ISSUED     ANTIGEN/ANTIBODY INFO Jk(A) NEGATIVE,     Crossmatch result Compatible     Unit number [id number]     Blood component type RC LR,2     Unit division 00     Status of unit ALLOCATED     ANTIGEN/ANTIBODY INFO Jk(A) NEGATIVE,     Crossmatch result Compatible        Assessment:     Hospital Problems  Date Reviewed: 10/27/2017          Codes Class Noted POA    Small bowel fistula ICD-10-CM: K63.2  ICD-9-CM: 569.81  3/7/2018 Unknown              Plan/Recommendations/Medical Decision Making:     - Continue antibiotic therapy  - Acute anemia:  Most likely oozing from Plavix. Will hold plavix for now and transfuse 2 units PRBC today.   - Clamp NG tube and remove NG tube at noon if residual is less than 250 ml  - Repeat CBC after 2 units given  - Labs in am  - Palliative consult for pain control  - Upper GI with small bowel follow through if no output from ostomy by tuesday    Signed By: Arlet Shook MD     March 11, 2018

## 2018-03-11 NOTE — PROGRESS NOTES
Bedside and Verbal shift change report given to Noe Plasencia RN (oncoming nurse) by Aileen Castillo RN (offgoing nurse). Report included the following information SBAR, Kardex, Procedure Summary, Intake/Output, MAR and Med Rec Status.

## 2018-03-11 NOTE — PROGRESS NOTES
Abdominal wound noted with bleeding outside of dressing with the wound vac dressing, output 350 in 4 hrs. Dr. Luz Elena Davila informed at 01:30am.  Orders to put the wound vac on hold, place abd pads and abd binder until assessed in am.       The beginning of Daylight Saving Time occurred at 0200 hrs. Documentation of patient care and medications administered is done with respect to the time change.

## 2018-03-12 LAB
ABO + RH BLD: NORMAL
ANION GAP SERPL CALC-SCNC: 6 MMOL/L (ref 5–15)
ANTIGENS PRESENT RBC DONR: NORMAL
ANTIGENS PRESENT RBC DONR: NORMAL
BLD PROD TYP BPU: NORMAL
BLD PROD TYP BPU: NORMAL
BLOOD GROUP ANTIBODIES SERPL: NORMAL
BPU ID: NORMAL
BPU ID: NORMAL
BUN SERPL-MCNC: 7 MG/DL (ref 6–20)
BUN/CREAT SERPL: 11 (ref 12–20)
CALCIUM SERPL-MCNC: 7.8 MG/DL (ref 8.5–10.1)
CHLORIDE SERPL-SCNC: 112 MMOL/L (ref 97–108)
CO2 SERPL-SCNC: 24 MMOL/L (ref 21–32)
CREAT SERPL-MCNC: 0.61 MG/DL (ref 0.55–1.02)
CROSSMATCH RESULT,%XM: NORMAL
CROSSMATCH RESULT,%XM: NORMAL
ERYTHROCYTE [DISTWIDTH] IN BLOOD BY AUTOMATED COUNT: 20.7 % (ref 11.5–14.5)
GLUCOSE BLD STRIP.AUTO-MCNC: 140 MG/DL (ref 65–100)
GLUCOSE BLD STRIP.AUTO-MCNC: 170 MG/DL (ref 65–100)
GLUCOSE BLD STRIP.AUTO-MCNC: 174 MG/DL (ref 65–100)
GLUCOSE BLD STRIP.AUTO-MCNC: 181 MG/DL (ref 65–100)
GLUCOSE BLD STRIP.AUTO-MCNC: 183 MG/DL (ref 65–100)
GLUCOSE SERPL-MCNC: 141 MG/DL (ref 65–100)
HCT VFR BLD AUTO: 24.3 % (ref 35–47)
HGB BLD-MCNC: 7.9 G/DL (ref 11.5–16)
MAGNESIUM SERPL-MCNC: 1.8 MG/DL (ref 1.6–2.4)
MCH RBC QN AUTO: 31.7 PG (ref 26–34)
MCHC RBC AUTO-ENTMCNC: 32.5 G/DL (ref 30–36.5)
MCV RBC AUTO: 97.6 FL (ref 80–99)
NRBC # BLD: 0.22 K/UL (ref 0–0.01)
NRBC BLD-RTO: 2.7 PER 100 WBC
PHOSPHATE SERPL-MCNC: 2.4 MG/DL (ref 2.6–4.7)
PLATELET # BLD AUTO: 88 K/UL (ref 150–400)
PMV BLD AUTO: 10.9 FL (ref 8.9–12.9)
POTASSIUM SERPL-SCNC: 3.7 MMOL/L (ref 3.5–5.1)
RBC # BLD AUTO: 2.49 M/UL (ref 3.8–5.2)
SERVICE CMNT-IMP: ABNORMAL
SODIUM SERPL-SCNC: 142 MMOL/L (ref 136–145)
SPECIMEN EXP DATE BLD: NORMAL
STATUS OF UNIT,%ST: NORMAL
STATUS OF UNIT,%ST: NORMAL
UNIT DIVISION, %UDIV: 0
UNIT DIVISION, %UDIV: 0
WBC # BLD AUTO: 8.3 K/UL (ref 3.6–11)

## 2018-03-12 PROCEDURE — 3331090001 HH PPS REVENUE CREDIT

## 2018-03-12 PROCEDURE — 74011636637 HC RX REV CODE- 636/637: Performed by: SURGERY

## 2018-03-12 PROCEDURE — 76450000000

## 2018-03-12 PROCEDURE — 85027 COMPLETE CBC AUTOMATED: CPT | Performed by: SURGERY

## 2018-03-12 PROCEDURE — 74011250636 HC RX REV CODE- 250/636: Performed by: NURSE PRACTITIONER

## 2018-03-12 PROCEDURE — 97110 THERAPEUTIC EXERCISES: CPT

## 2018-03-12 PROCEDURE — 74011250636 HC RX REV CODE- 250/636: Performed by: SURGERY

## 2018-03-12 PROCEDURE — 84100 ASSAY OF PHOSPHORUS: CPT | Performed by: SURGERY

## 2018-03-12 PROCEDURE — 74011000250 HC RX REV CODE- 250: Performed by: SURGERY

## 2018-03-12 PROCEDURE — 0DHA3UZ INSERTION OF FEEDING DEVICE INTO JEJUNUM, PERCUTANEOUS APPROACH: ICD-10-PCS | Performed by: SURGERY

## 2018-03-12 PROCEDURE — 74011000258 HC RX REV CODE- 258: Performed by: SURGERY

## 2018-03-12 PROCEDURE — 74011250636 HC RX REV CODE- 250/636: Performed by: PHYSICAL MEDICINE & REHABILITATION

## 2018-03-12 PROCEDURE — 65660000000 HC RM CCU STEPDOWN

## 2018-03-12 PROCEDURE — 82962 GLUCOSE BLOOD TEST: CPT

## 2018-03-12 PROCEDURE — 74011250637 HC RX REV CODE- 250/637: Performed by: SURGERY

## 2018-03-12 PROCEDURE — 74011250637 HC RX REV CODE- 250/637: Performed by: PHYSICAL MEDICINE & REHABILITATION

## 2018-03-12 PROCEDURE — 74011250637 HC RX REV CODE- 250/637: Performed by: PHYSICIAN ASSISTANT

## 2018-03-12 PROCEDURE — 83735 ASSAY OF MAGNESIUM: CPT | Performed by: SURGERY

## 2018-03-12 PROCEDURE — 3331090002 HH PPS REVENUE DEBIT

## 2018-03-12 PROCEDURE — 36415 COLL VENOUS BLD VENIPUNCTURE: CPT | Performed by: SURGERY

## 2018-03-12 PROCEDURE — 80048 BASIC METABOLIC PNL TOTAL CA: CPT | Performed by: SURGERY

## 2018-03-12 RX ORDER — NALOXONE HYDROCHLORIDE 0.4 MG/ML
0.4 INJECTION, SOLUTION INTRAMUSCULAR; INTRAVENOUS; SUBCUTANEOUS
Status: DISCONTINUED | OUTPATIENT
Start: 2018-03-12 | End: 2018-05-23 | Stop reason: HOSPADM

## 2018-03-12 RX ORDER — MAGNESIUM SULFATE HEPTAHYDRATE 40 MG/ML
2 INJECTION, SOLUTION INTRAVENOUS ONCE
Status: COMPLETED | OUTPATIENT
Start: 2018-03-12 | End: 2018-03-15

## 2018-03-12 RX ORDER — NYSTATIN 100000 U/G
CREAM TOPICAL 2 TIMES DAILY
Status: DISCONTINUED | OUTPATIENT
Start: 2018-03-12 | End: 2018-05-23 | Stop reason: HOSPADM

## 2018-03-12 RX ORDER — MORPHINE SULFATE 100 MG/1
100 TABLET, FILM COATED, EXTENDED RELEASE ORAL 3 TIMES DAILY
Status: DISCONTINUED | OUTPATIENT
Start: 2018-03-12 | End: 2018-03-13

## 2018-03-12 RX ORDER — HYDROMORPHONE HYDROCHLORIDE 2 MG/ML
2 INJECTION, SOLUTION INTRAMUSCULAR; INTRAVENOUS; SUBCUTANEOUS
Status: DISCONTINUED | OUTPATIENT
Start: 2018-03-12 | End: 2018-03-16

## 2018-03-12 RX ADMIN — Medication 10 ML: at 11:49

## 2018-03-12 RX ADMIN — MORPHINE SULFATE 100 MG: 100 TABLET, EXTENDED RELEASE ORAL at 16:16

## 2018-03-12 RX ADMIN — MAGNESIUM SULFATE HEPTAHYDRATE 2 G: 40 INJECTION, SOLUTION INTRAVENOUS at 11:35

## 2018-03-12 RX ADMIN — Medication 10 ML: at 15:07

## 2018-03-12 RX ADMIN — PROMETHAZINE HYDROCHLORIDE 12.5 MG: 25 INJECTION INTRAMUSCULAR; INTRAVENOUS at 22:41

## 2018-03-12 RX ADMIN — FAMOTIDINE: 10 INJECTION, SOLUTION INTRAVENOUS at 18:21

## 2018-03-12 RX ADMIN — Medication 10 ML: at 14:00

## 2018-03-12 RX ADMIN — Medication 10 ML: at 09:56

## 2018-03-12 RX ADMIN — ONDANSETRON HYDROCHLORIDE 4 MG: 2 INJECTION INTRAMUSCULAR; INTRAVENOUS at 02:23

## 2018-03-12 RX ADMIN — INSULIN LISPRO 3 UNITS: 100 INJECTION, SOLUTION INTRAVENOUS; SUBCUTANEOUS at 12:01

## 2018-03-12 RX ADMIN — ENOXAPARIN SODIUM 135 MG: 150 INJECTION SUBCUTANEOUS at 09:12

## 2018-03-12 RX ADMIN — ONDANSETRON HYDROCHLORIDE 4 MG: 2 INJECTION INTRAMUSCULAR; INTRAVENOUS at 07:53

## 2018-03-12 RX ADMIN — INSULIN LISPRO 3 UNITS: 100 INJECTION, SOLUTION INTRAVENOUS; SUBCUTANEOUS at 23:12

## 2018-03-12 RX ADMIN — DRONABINOL 2.5 MG: 2.5 CAPSULE ORAL at 08:42

## 2018-03-12 RX ADMIN — PROCHLORPERAZINE EDISYLATE 5 MG: 5 INJECTION INTRAMUSCULAR; INTRAVENOUS at 19:16

## 2018-03-12 RX ADMIN — INSULIN LISPRO 3 UNITS: 100 INJECTION, SOLUTION INTRAVENOUS; SUBCUTANEOUS at 17:13

## 2018-03-12 RX ADMIN — MORPHINE SULFATE 100 MG: 100 TABLET, EXTENDED RELEASE ORAL at 21:57

## 2018-03-12 RX ADMIN — INSULIN LISPRO 3 UNITS: 100 INJECTION, SOLUTION INTRAVENOUS; SUBCUTANEOUS at 05:34

## 2018-03-12 RX ADMIN — Medication 10 ML: at 22:45

## 2018-03-12 RX ADMIN — DRONABINOL 2.5 MG: 2.5 CAPSULE ORAL at 17:13

## 2018-03-12 RX ADMIN — Medication 10 ML: at 05:35

## 2018-03-12 RX ADMIN — Medication: at 11:23

## 2018-03-12 RX ADMIN — INSULIN LISPRO 3 UNITS: 100 INJECTION, SOLUTION INTRAVENOUS; SUBCUTANEOUS at 00:13

## 2018-03-12 RX ADMIN — ONDANSETRON HYDROCHLORIDE 4 MG: 2 INJECTION INTRAMUSCULAR; INTRAVENOUS at 20:25

## 2018-03-12 RX ADMIN — Medication 20 ML: at 18:21

## 2018-03-12 RX ADMIN — SODIUM CHLORIDE 250 ML: 900 INJECTION, SOLUTION INTRAVENOUS at 04:40

## 2018-03-12 RX ADMIN — ONDANSETRON HYDROCHLORIDE 4 MG: 2 INJECTION INTRAMUSCULAR; INTRAVENOUS at 14:34

## 2018-03-12 RX ADMIN — LORAZEPAM 1 MG: 2 INJECTION INTRAMUSCULAR; INTRAVENOUS at 11:49

## 2018-03-12 RX ADMIN — NYSTATIN: 100000 CREAM TOPICAL at 17:12

## 2018-03-12 RX ADMIN — HYDROMORPHONE HYDROCHLORIDE 2 MG: 1 INJECTION, SOLUTION INTRAMUSCULAR; INTRAVENOUS; SUBCUTANEOUS at 08:42

## 2018-03-12 RX ADMIN — I.V. FAT EMULSION 50 ML/HR: 20 EMULSION INTRAVENOUS at 18:23

## 2018-03-12 RX ADMIN — HYDROMORPHONE HYDROCHLORIDE 2 MG: 1 INJECTION, SOLUTION INTRAMUSCULAR; INTRAVENOUS; SUBCUTANEOUS at 02:23

## 2018-03-12 RX ADMIN — HYDROMORPHONE HYDROCHLORIDE 2 MG: 2 INJECTION INTRAMUSCULAR; INTRAVENOUS; SUBCUTANEOUS at 18:18

## 2018-03-12 RX ADMIN — HYDROMORPHONE HYDROCHLORIDE 2 MG: 2 INJECTION INTRAMUSCULAR; INTRAVENOUS; SUBCUTANEOUS at 14:30

## 2018-03-12 RX ADMIN — HYDROMORPHONE HYDROCHLORIDE 2 MG: 1 INJECTION, SOLUTION INTRAMUSCULAR; INTRAVENOUS; SUBCUTANEOUS at 05:35

## 2018-03-12 RX ADMIN — HYDROMORPHONE HYDROCHLORIDE 2 MG: 2 INJECTION INTRAMUSCULAR; INTRAVENOUS; SUBCUTANEOUS at 23:01

## 2018-03-12 NOTE — PROGRESS NOTES
NUTRITION       Recommendations:  1. If unable to advance diet order, will need to consider concentrating TPN in acceptable volume or resuming volume of 1992 mL/day (83 mL/hr) to prevent nutrition decline and worsening edema (see recs below)  -- Would also resume cyclic regimen to prevent cholestasis    2. Continue daily weights (need to rezero bedscale secondary to 14# weight difference x 1 day-- if pumps, etc are left on the bed, please this write on the white board so weight trends can remain consistent)    Brief note. Chart reviewed, discussed with RN and team during interdisciplinary rounds. Events over the weekend noted (wound vac taken down secondary to bleeding, 2 units of blood administered, NGT removed, no ostomy function). Pt remains on TPN as sole source of nutrition. Spoke with NP and hopeful plan is for pt to be eating when she is ready to be discharged (without TPN). Noted TPN advanced to 83 mL/hr on 3/9, but decreased 3/10 to 42 mL/hr-- reason unknown. She continues to have 3+ RLE, LLE edema and generalized anasarca. Without adequate TPN, this may cause 3rd spacing. TPN: 5%AA D20 @ 42 mL/hr + MVI, famotidine 40 mg, thiamine (100 mg), trace elements (3x/week)   + 20% lipids, 500 mL 3x/week  -- This provides a daily average of 1314 kcal, 50 g protein in 1008 mL (1508 mL on lipid days)-- meeting 87% and 45% of estimated kcal and protein needs, respectively. If unable to increase volume to 2L/day, would recommend concentrating in smaller volume. Example: 6%AA D20 @ 70 mL/hr x 1st hour; 125 mL/hr x 11 hours; 70 mL/hr x last hour + 20% lipids, 500 mL 3x/week-- providing a daily average of 1820 kcal, 91 g protein in 1515 mL (2015 mL on lipid days).     Estimated Nutrition Needs:   Kcals/day: 1507 Kcals/day (4230-0392 kcal/day (11-15 kcal/kg))  Protein: 110 g (0.8 g/kg)  Fluid: 2000 ml (or per output)     Based On: Kcal/kg - specify (Comment)  Weight Used: Actual wt (137 kg (need updated wt))    RD to follow.     [address]

## 2018-03-12 NOTE — PROGRESS NOTES
Problem: Falls - Risk of  Goal: *Absence of Falls  Document Marlene Fall Risk and appropriate interventions in the flowsheet.    Outcome: Progressing Towards Goal  Fall Risk Interventions:  Mobility Interventions: Communicate number of staff needed for ambulation/transfer, Mechanical lift, PT Consult for mobility concerns         Medication Interventions: Patient to call before getting OOB    Elimination Interventions: Call light in reach, Toileting schedule/hourly rounds    History of Falls Interventions: Door open when patient unattended

## 2018-03-12 NOTE — PROGRESS NOTES
Spiritual Care Assessment/Progress Note  [facility]      NAME: Florentino Espitia      MRN: [id number]  AGE: 38 y.o. SEX: female  Yarsani Affiliation: Non Congregation   Language: English     3/12/2018     Total Time (in minutes): 17     Spiritual Assessment begun in [facility] 4 SURG/BARIATRICS through conversation with:         []Patient        [] Family    [] Friend(s)        Reason for Consult: Palliative Care, Initial/Spiritual Assessment     Spiritual beliefs: (Please include comment if needed)     [x] Involved in a jalen tradition/spiritual practice:     [] Supported by a jalen community:      [] Claims no spiritual orientation:      [] Seeking spiritual identity:           [] Adheres to an individual form of spirituality:      [] Not able to assess:                     Identified resources for coping:      [] Prayer                  [] Devotional reading               [] Music                  [] Guided Imagery     [x] Family/friends                 [] Pet visits     [] Other       Interventions offered during this visit: (See comments for more details)    Patient Interventions: Affirmation of emotions/emotional suffering, Catharsis/review of pertinent events in supportive environment, Initial/Spiritual assessment, patient floor     Family/Friend(s):  Affirmation of emotions/emotional suffering, Catharsis/review of pertinent events in supportive environment     Plan of Care:     [] Discuss Spiritual/Cultural needs    [] Support AMD and/or advance care planning process      [] Support grieving process   [] Coordinate Rites/Rituals    [] Coordination with community clergy   [] No spiritual needs identified at this time   [] Detailed Plan of Care below (See Comments)  [] Make referral to Music Therapy  [] Make referral to Pet Therapy     [] Make referral to Addiction services  [] Make referral to [facility]  [] Make referral to Spiritual Care Partner  [] No future visits requested             Comments: Visited Ms. Kwong with Palliative Dr Riley Both. Pt is known to the Palliative team from previous hospitalizations. Pt's mother was present at bedside. Most of our visit today was focused on pt's symptoms; pt provided an update on how things were going at home prior to her hospitalization and how she is feeling now. Pt appears to have good support from her mom. She has been receptive to  support during previous hospitalizations; will attempt to follow-up as able/needed.     Bhumika Friend, Palliative

## 2018-03-12 NOTE — CONSULTS
Palliative Medicine Consult  [place]: [phone] ([phone])    Patient Name: Billy Chandler  YOB: 1975    Date of Initial Consult: 3/12/18  Reason for Consult: Pain management, chronic and post op   Requesting Provider: Rajni   Primary Care Physician: Samuel Royal MD     SUMMARY:   Billy Chandler is a 38 y.o. with a past history of Crohn's disease (followed by Dr Virginia Puga), mult surgeries s/p total colectomy w/ end ileostomy, 6/2017 ex lap with small bowel perforation, 6/28/17 ex lap, LPA, repair or enterotomy, SMA stent on L, 7/16/2917 ex lap, KATHRINE, fistula exclusion w/ feeding tube placement and prolonged TPN who was admitted on 3/7/2018 from home for planned surgery, s/p ex lap w/ extensive KATHRINE and small bowel fistula take down, wound vac placement. Known to our team during past hospitalizations. Has had lengthy hospital stays w/ mult complications. Has required [facility] stays in past. Has chronic pain from Crohn's disease (and has not been able to tolerate home Crohn's meds recently due to acute issues- had been on steroids, stopped prior to surgery). Discussion of Remicaide in future. Current medical issues leading to Palliative Medicine involvement include: pain management. Patient's mother, Dorita Perdomo is NOK.  reviewed, no abberrancies- one prescriber for opioids and one pharmacy. Most recently filled 3/9/18: MSContin 100mg tid and Morphine IR 30mg- 8 tabs a day. PALLIATIVE DIAGNOSES:   1. Acute post operative pain in abdomen  2. Chronic abdominal pain from Cronh's disease and hx of surgeries  3. Nausea  4. Feeding difficulties due to medical condition- has been on TPN for several months  5. Edema   6. L ankle pain s/p fall, normal XRs  7. Grief/depressive sx   8. Constipation      PLAN:   1. Along w/  Debbie Jacobs, meet w/ pt and mom Roxanna Karen. They are well known to us from previous hospital stays.    2. At home, chronic abdominal pain well managed on regimen by Dr Barros Ards Tanvir (see above)- however now w/ acute post-op pain as well. 3. Fentanyl patch at 100mcg dose has not helped as much as MSContin in past. Now able to take sips and is on po Marinol. 4.  reviewed, appropriate. 5. Plan:  1. Stop Fentanyl patch 100mcg/h every 3 days. 2. Start home MSContin 100mg tid. 3. Incr Dilaudid PCA a bit- no basal, 0.4mg IV every 6 min demand. 4. Can keep prn Dilaudid 2mg IV every 3h for breakthrough dose. 5. Following to assist w/ transition to po medication/home regimen as heals. 6. Bowel regimen per surgery. 6. Support given. 7. Initial consult note routed to primary continuity provider  8. Communicated plan of care with: Palliative IDT       GOALS OF CARE / TREATMENT PREFERENCES:     GOALS OF CARE:  Patient/Health Care Proxy Stated Goals:  (Sx management )      TREATMENT PREFERENCES:   Code Status: Full Code    Advance Care Planning:  Advance Care Planning 3/8/2018   Patient's Healthcare Decision Maker is: Named in scanned ACP document   Primary Decision Maker Name Faina Do   Primary Decision Maker Phone Number [phone]   Primary Decision Maker Relationship to Patient Parent   Confirm Advance Directive Yes, on file   Patient Would Like to Complete Advance Directive No   Does the patient have other document types Power of        Medical Interventions: Full interventions           Other:    As far as possible, the palliative care team has discussed with patient / health care proxy about goals of care / treatment preferences for patient. HISTORY:     History obtained from: Pt, chart, family     CHIEF COMPLAINT: acute pain     HPI/SUBJECTIVE:    The patient is:   [x] Verbal and participatory  [] Non-participatory due to:     Pt a bit tearful, as never likes to be in the hospital. However recovering okay, and prior to this planned surgery was doing okay at home- able to do ADLs, working w/ therapies and home health.  Did have trouble w/ edema in LEs, attributes some to the TPN she has been on for months. For pain description, see below. At home her chronic pain was managed on home regimen.      Meds for pain and sx incl:  Fentanyl patch 100mcg/h every 3 days  Scopolamine patch   Dilaudid PCA, no basal, 0.3mg IV every 6 min demand- 3mg in past 4h   Marinol   Dilaudid 2mg IV every 3h prn breakthrough from PCA- 6 doses in past 24h  Ativan 1mg IV every 6h prn- 1 dose in past 24h    Clinical Pain Assessment (nonverbal scale for severity on nonverbal patients):   Clinical Pain Assessment  Severity: 8  Location: Abdomen   Character: Sharp and aching   Duration: chronic and since surgery   Effect: harder to move   Factors: worse w/ movement and wound care  Frequency: constant          Duration: for how long has pt been experiencing pain (e.g., 2 days, 1 month, years)  Frequency: how often pain is an issue (e.g., several times per day, once every few days, constant)     FUNCTIONAL ASSESSMENT:     Palliative Performance Scale (PPS):  PPS: 60       PSYCHOSOCIAL/SPIRITUAL SCREENING:     Palliative IDT has assessed this patient for cultural preferences / practices and a referral made as appropriate to needs (Cultural Services, Patient Advocacy, Ethics, etc.)    Advance Care Planning:  Advance Care Planning 3/8/2018   Patient's Healthcare Decision Maker is: Named in scanned ACP document   Primary Decision Maker Name César Don   Primary Decision Maker Phone Number [phone]   Primary Decision Maker Relationship to Patient Parent   Confirm Advance Directive Yes, on file   Patient Would Like to Complete Advance Directive No   Does the patient have other document types Power of        Any spiritual / Jehovah's witness concerns:  [] Yes /  [x] No    Caregiver Burnout:  [] Yes /  [x] No /  [] No Caregiver Present      Anticipatory grief assessment:   [x] Normal  / [] Maladaptive       ESAS Anxiety: Anxiety: 4    ESAS Depression: Depression: 2        REVIEW OF SYSTEMS: Positive and pertinent negative findings in ROS are noted above in HPI. The following systems were [x] reviewed / [] unable to be reviewed as noted in HPI  Other findings are noted below. Systems: constitutional, ears/nose/mouth/throat, respiratory, gastrointestinal, genitourinary, musculoskeletal, integumentary, neurologic, psychiatric, endocrine. Positive findings noted below. Modified ESAS Completed by: provider   Fatigue: 3 Drowsiness: 0   Depression: 2 Pain: 8   Anxiety: 4 Nausea: 4   Anorexia: 8 Dyspnea: 0     Constipation: Yes              PHYSICAL EXAM:     From RN flowsheet:  Wt Readings from Last 3 Encounters:   03/12/18 344 lb 9.3 oz (156.3 kg)   03/05/18 304 lb 3.2 oz (138 kg)   02/28/18 306 lb (138.8 kg)     Blood pressure 107/76, pulse (!) 131, temperature 98.4 °F (36.9 °C), resp. rate 21, height 5' 4\" (1.626 m), weight 344 lb 9.3 oz (156.3 kg), SpO2 98 %.     Pain Scale 1: Numeric (0 - 10)  Pain Intensity 1: 7  Pain Onset 1: post op  Pain Location 1: Abdomen  Pain Orientation 1: Medial, Anterior  Pain Description 1: Aching  Pain Intervention(s) 1: Medication (see MAR) (breakthrough dilaudid provideded)  Last bowel movement, if known: prior to surgery     Constitutional: awake, alert, oriented, no sedation or confusion   Eyes: pupils equal, anicteric  ENMT: no nasal discharge, moist mucous membranes  Cardiovascular: regular rhythm, tachycardic   Respiratory: breathing not labored, symmetric  Gastrointestinal: soft , TTP, hypoactive bowel sounds, ostomy site pink, midline incisions c/d/i  Musculoskeletal: no deformity, TTP over L ankle  Skin: warm, dry  Neurologic: following commands, moving all extremities  Psychiatric: full affect, no hallucinations         HISTORY:     Active Problems:    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) 1990    age 17; A WEEK POST OP, HAD SEIZURES AND STROKE, WAS IN COMA FOR A MONTH    Thyroid disease     HYPO-NO MEDS      Past Surgical History:   Procedure Laterality Date    HX APPENDECTOMY      HX GYN  2015    HIDRADENTIS LABIA    HX OTHER SURGICAL      multiple procedures related to her Crohn's disease    HX OTHER SURGICAL      ABDOMINAL SURGERY REMOVING COLON, 2/3 STOMACH, 1/3 SMALL INTESTINE    IN LAP, INCISIONAL HERNIA REPAIR,INCARCERATED  9-10-08    dr. Galen Bustillo      Family History   Problem Relation Age of Onset    Hypertension Father     Stroke Father     Other Father      arthritis    Arthritis-osteo Father     Hypertension Mother     Arthritis-osteo Mother     Anesth Problems Neg Hx       History reviewed, no pertinent family history.   Social History   Substance Use Topics    Smoking status: Former Smoker     Packs/day: 1.00     Years: 16.00     Quit date: 2/27/2017    Smokeless tobacco: Former User      Comment: OFF AND ON    Alcohol use No     Allergies   Allergen Reactions    Sulfa (Sulfonamide Antibiotics) Anaphylaxis    Demerol [Meperidine] Rash    Soma [Carisoprodol] Rash and Nausea Only    Toradol [Ketorolac Tromethamine] Nausea and Vomiting      Current Facility-Administered Medications   Medication Dose Route Frequency    magnesium sulfate 2 g/50 ml IVPB (premix or compounded)  2 g IntraVENous ONCE    TPN ADULT - CENTRAL   IntraVENous CONTINUOUS    nystatin (MYCOSTATIN) 100,000 unit/gram cream   Topical BID    prochlorperazine (COMPAZINE) with saline injection 5 mg  5 mg IntraVENous Q6H PRN    morphine CR (MS CONTIN) tablet 100 mg  100 mg Oral TID    naloxone (NARCAN) injection 0.4 mg  0.4 mg IntraVENous EVERY 2 MINUTES AS NEEDED    0.9% sodium chloride infusion 250 mL  250 mL IntraVENous PRN    TPN ADULT - CENTRAL   IntraVENous CONTINUOUS    fat emulsion 20% (LIPOSYN, INTRAlipid) infusion  50 mL/hr IntraVENous Q MON, WED & FRI    glucose chewable tablet 16 g  4 Tab Oral PRN    dextrose (D50W) injection syrg 12.5-25 g  12.5-25 g IntraVENous PRN    glucagon (GLUCAGEN) injection 1 mg  1 mg IntraMUSCular PRN    insulin lispro (HUMALOG) injection   SubCUTAneous Q6H    enoxaparin (LOVENOX) injection 135 mg  135 mg SubCUTAneous Q12H    scopolamine (TRANSDERM-SCOP) 1 mg over 3 days 1 Patch  1 Patch TransDERmal Q72H    sodium chloride (NS) flush 20 mL  20 mL InterCATHeter PRN    sodium chloride (NS) flush 10 mL  10 mL InterCATHeter Q24H    sodium chloride (NS) flush 10 mL  10 mL InterCATHeter PRN    sodium chloride (NS) flush 10 mL  10 mL InterCATHeter Q8H    alteplase (CATHFLO) 1 mg in sterile water (preservative free) 1 mL injection  1 mg InterCATHeter PRN    bacitracin 500 unit/gram packet 1 Packet  1 Packet Topical PRN    sodium chloride (NS) flush 20 mL  20 mL InterCATHeter PRN    sodium chloride (NS) flush 10 mL  10 mL InterCATHeter Q24H    sodium chloride (NS) flush 10 mL  10 mL InterCATHeter PRN    sodium chloride (NS) flush 10 mL  10 mL InterCATHeter Q8H    dronabinol (MARINOL) capsule 2.5 mg  2.5 mg Oral BID    lactated Ringers infusion  25 mL/hr IntraVENous CONTINUOUS    HYDROmorphone (PF) 15 mg/30 ml (DILAUDID) PCA   IntraVENous CONTINUOUS    HYDROmorphone (PF) (DILAUDID) injection 2 mg  2 mg IntraVENous Q3H PRN    phenol throat spray (CHLORASEPTIC) 1 Spray  1 Spray Oral PRN    ondansetron (ZOFRAN) injection 4 mg  4 mg IntraVENous Q4H PRN    promethazine (PHENERGAN) 12.5 mg in 0.9% sodium chloride 50 mL IVPB  12.5 mg IntraVENous Q6H PRN    LORazepam (ATIVAN) injection 1 mg  1 mg IntraVENous Q6H PRN    albuterol (PROVENTIL VENTOLIN) nebulizer solution 2.5 mg  2.5 mg Nebulization Q4H PRN          LAB AND IMAGING FINDINGS:     Lab Results   Component Value Date/Time    WBC 8.3 03/12/2018 02:21 AM    HGB 7.9 (L) 03/12/2018 02:21 AM    PLATELET 88 (L) 47/69/3105 02:21 AM     Lab Results   Component Value Date/Time    Sodium 142 03/12/2018 02:21 AM    Potassium 3.7 03/12/2018 02:21 AM    Chloride 112 (H) 03/12/2018 02:21 AM    CO2 24 03/12/2018 02:21 AM    BUN 7 03/12/2018 02:21 AM    Creatinine 0.61 03/12/2018 02:21 AM    Calcium 7.8 (L) 03/12/2018 02:21 AM    Magnesium 1.8 03/12/2018 02:21 AM    Phosphorus 2.4 (L) 03/12/2018 02:21 AM      Lab Results   Component Value Date/Time    AST (SGOT) 33 03/08/2018 03:11 AM    Alk. phosphatase 80 03/08/2018 03:11 AM    Protein, total 5.1 (L) 03/08/2018 03:11 AM    Albumin 2.3 (L) 03/08/2018 03:11 AM    Globulin 2.8 03/08/2018 03:11 AM     Lab Results   Component Value Date/Time    INR 1.0 12/07/2017 08:37 AM    Prothrombin time 10.0 12/07/2017 08:37 AM      No results found for: IRON, FE, TIBC, IBCT, PSAT, FERR   No results found for: PH, PCO2, PO2  No components found for: GLPOC   No results found for: CPK, CKMB             Total time:   Counseling / coordination time, spent as noted above:   > 50% counseling / coordination?:     Prolonged service was provided for  []30 min   []75 min in face to face time in the presence of the patient, spent as noted above. Time Start:   Time End:   Note: this can only be billed with 27920 (initial) or 95420 (follow up). If multiple start / stop times, list each separately.

## 2018-03-12 NOTE — PROGRESS NOTES
Bedside shift change report given to Hammad (oncoming nurse) by Geovanna Marcos (offgoing nurse). Report included the following information SBAR, Procedure Summary, Intake/Output, MAR, Recent Results and Cardiac Rhythm Sinus tachy.

## 2018-03-12 NOTE — CONSULTS
Ortho Consult    Patient is a Podiatry patient and has been seen by Dr Christel Huang on 3/9/18.  Please see his notes for plan of care

## 2018-03-12 NOTE — PROGRESS NOTES
Pt refuses to be turned and bed linen to be changed. Unable to assess the abdominal site where she complains it's hurting her. Does not want to be moved.

## 2018-03-12 NOTE — PROGRESS NOTES
03/12/18 0718   Vitals   Temp 98.4 °F (36.9 °C)   Temp Source Oral   Pulse (Heart Rate) (!) 129   Heart Rate Source Monitor   Resp Rate 21   O2 Sat (%) 98 %   Level of Consciousness Alert   /76   MAP (Calculated) 86   BP 1 Location Left arm   BP 1 Method Automatic   BP Patient Position At rest   Cardiac Rhythm Sinus Tach   MEWS Score 4   Mews of 4 for this morning VS.  Will continue to monitor patient. Provider aware of tachycardia. Palliative assisting with pain control issue. Pt encouraged to focus on deep breathing instead of moaning and groaning during any slight body movement or repositioning of sheet/blankets on top of her.

## 2018-03-12 NOTE — PROGRESS NOTES
Problem: Mobility Impaired (Adult and Pediatric)  Goal: *Acute Goals and Plan of Care (Insert Text)  Physical Therapy Goals  Initiated 3/8/2018  1. Patient will move from supine to sit and sit to supine  in bed with moderate assistance  within 7 day(s). 2.  Patient will transfer from bed to chair and chair to bed with moderate assistance  using the least restrictive device within 7 day(s). 3.  Patient will perform sit to stand with moderate assistance  within 7 day(s). 4.  PT will assess gait with the least restrictive device within 7 day(s). physical Therapy TREATMENT  Patient: Nikhil Wilkerson (38 y.o. female)  Date: 3/12/2018  Diagnosis: FISTULA  Small bowel fistula <principal problem not specified>  Procedure(s) (LRB):  EXPLORATORY LAPAROTOMY, LYSIS OF ADHESION X 5HOURS, SMALL BOWEL FISTULA TAKE DOWN X 2 AND WOUND VAC PLACEMENT (N/A) 5 Days Post-Op  Precautions:   fall and contact 1  Chart, physical therapy assessment, plan of care and goals were reviewed. ASSESSMENT:  Pt received after she had just gotten clean up by nursing, pt requesting to defer PT. With encouragement she was agreeable to bed exercises. She participated in the following ex bilaterally 10 reps: ankle pumps, quad sets, glut sets, SLR on left and one rep on the right given that it caused her back pain. Pt deferred all mobility. Progression toward goals:  []    Improving appropriately and progressing toward goals  []    Improving slowly and progressing toward goals  [x]    Not making progress toward goals and plan of care will be adjusted     PLAN:  Patient continues to benefit from skilled intervention to address the above impairments. Continue treatment per established plan of care. Discharge Recommendations:  Rehab, Home Health and To Be Determined  Further Equipment Recommendations for Discharge:   To be determined      SUBJECTIVE:   Patient stated Can I not do that right now, regarding PT    OBJECTIVE DATA SUMMARY:   Chart checked, pt cleared by nursing. Critical Behavior:  Neurologic State: Alert  Orientation Level: Oriented X4  Cognition: Appropriate decision making     Functional Mobility Training:  Bed Mobility:         not assessed           Transfers:               not assessed                     Balance:     Ambulation/Gait Training:                                                      Not assessed   Stairs:              Neuro Re-Education:    Therapeutic Exercises:   See assessment above  Pain:  Pain Scale 1: Numeric (0 - 10)  Pain Intensity 1: 8  Pain Location 1: Abdomen  Pain Orientation 1: Anterior  Pain Description 1: Aching  Pain Intervention(s) 1: Medication (see MAR)  Activity Tolerance:   -126 throughout session  Please refer to the flowsheet for vital signs taken during this treatment.   After treatment:   []    Patient left in no apparent distress sitting up in chair  [x]    Patient left in no apparent distress in bed  [x]    Call bell left within reach  [x]    Nursing notified  [x]    Caregiver present  []    Bed alarm activated    COMMUNICATION/COLLABORATION:   The patients plan of care was discussed with: Registered Nurse    Odette Samaniego   Time Calculation: 12 mins

## 2018-03-12 NOTE — ADT AUTH CERT NOTES
Karen Dominguez, PT, DPT Physical Therapist Signed  Progress Notes Date of Service: 03/08/18 5180         Problem: Mobility Impaired (Adult and Pediatric)  Goal: *Acute Goals and Plan of Care (Insert Text)  Physical Therapy Goals  Initiated 3/8/2018  1. Patient will move from supine to sit and sit to supine  in bed with moderate assistance  within 7 day(s). 2.  Patient will transfer from bed to chair and chair to bed with moderate assistance  using the least restrictive device within 7 day(s). 3.  Patient will perform sit to stand with moderate assistance  within 7 day(s). 4.  PT will assess gait with the least restrictive device within 7 day(s). PHYSICAL THERAPY EVALUATION  Patient: Florentino Espitia (38 y.o. female)  Date: 3/8/2018  Primary Diagnosis: FISTULA  Small bowel fistula  Procedure(s) (LRB):  EXPLORATORY LAPAROTOMY, LYSIS OF ADHESION X 5HOURS, SMALL BOWEL FISTULA TAKE DOWN X 2 AND WOUND VAC PLACEMENT (N/A) 1 Day Post-Op   Precautions: Contact        ASSESSMENT :  Based on the objective data described below, the patient presents with high levels of pain, anxiety regarding activity impairing functional mobility post op. Attempted to see x2 today and deferred for the am d/t reported pain levels but agreed to return for the pm after adjustment of meds. Patient agreeable to sit EOB with encouragement via log roll technique. Mod x2 required to roll to side and attempt sidelying to sit. Patient screamed in pain as she attempted to sit and sobbed once seated EOB. Returned to supine at her request due to severity of pain. Once returned supine, a staff member accidentally provided traction on woodruff cath which remained in retainer attached to the patient's leg but resulted in c/o of additional pain. Will follow to progress mobility as patient and her pain allow. Discharge recommendations to be determined with improved pain.  She typically lives with her mother who was present throughout the session.     Patient will benefit from skilled intervention to address the above impairments. Patients rehabilitation potential is considered to be Good  Factors which may influence rehabilitation potential include:   []         None noted  []         Mental ability/status  []         Medical condition  []         Home/family situation and support systems  []         Safety awareness  [x]         Pain tolerance/management  []         Other:       PLAN :  Recommendations and Planned Interventions:  [x]           Bed Mobility Training             [x]    Neuromuscular Re-Education  [x]           Transfer Training                   []    Orthotic/Prosthetic Training  [x]           Gait Training                         []    Modalities  [x]           Therapeutic Exercises           []    Edema Management/Control  [x]           Therapeutic Activities            []    Patient and Family Training/Education  []           Other (comment):     Frequency/Duration: Patient will be followed by physical therapy  5 times a week to address goals. Discharge Recommendations: Rehab and To Be Determined  Further Equipment Recommendations for Discharge: None so far      SUBJECTIVE:   Patient stated I just had surgery yesterday.      OBJECTIVE DATA SUMMARY:   HISTORY:         Past Medical History:   Diagnosis Date    Adverse effect of anesthesia       WOKE DURING SURGERY    Arthritis      Blood clot in vein 2017     STOMACH    Crohn's disease ([address] 8/15/2011    DVT (deep venous thrombosis) ([facility] 8/15/2011     LEFT LEG    Edema       GENERALIZED R/T TPN; WAIST DOWN    Incarcerated ventral hernia 8/15/2011    Lupus      Lyme disease      Psychiatric disorder       ANXIETY    Right flank pain 8/22/2011    Seizures ([address] 1990     LAST AT AGE 15    Stroke ([facility] 200     age 17;  A WEEK POST OP, HAD SEIZURES AND STROKE, WAS IN COMA FOR A MONTH    Thyroid disease       HYPO-NO MEDS            Past Surgical History:   Procedure Laterality Date    HX APPENDECTOMY        HX GYN   2015     HIDRADENTIS LABIA    HX OTHER SURGICAL         multiple procedures related to her Crohn's disease    HX OTHER SURGICAL         ABDOMINAL SURGERY REMOVING COLON, 2/3 STOMACH, 1/3 SMALL INTESTINE    [place] LAP, INCISIONAL HERNIA REPAIR,INCARCERATED   9-10-08     dr. Shabana Carpio      Prior Level of Function/Home Situation: ambulated with quad cane prior to illness  Personal factors and/or comorbidities impacting plan of care:      Home Situation  Home Environment: Private residence (lives with mom)  # Steps to Enter: 0  One/Two Story Residence: One story  Living Alone: No  Support Systems: Home care staff, Parent  Patient Expects to be Discharged to[de-identified] Private residence  Current DME Used/Available at Home: [address], quad     EXAMINATION/PRESENTATION/DECISION MAKING:   Critical Behavior:  Neurologic State: Drowsy  Orientation Level: Oriented X4  Cognition: Follows commands  Hearing: Auditory  Auditory Impairment: None  Skin: dressings, NG tube, and left CHRIS drains intact  Edema: pitting edema BLE  Range Of Motion:  AROM: Generally decreased, functional  Strength:    Strength: Generally decreased, functional  Tone & Sensation:   Sensation: Intact      Coordination:  Coordination: Within functional limits  Vision:   Functional Mobility:  Bed Mobility:  Rolling:  Moderate assistance;Assist x2  Supine to Sit: Moderate assistance;Assist x2           Physical Therapy Evaluation Charge Determination   History Examination Presentation Decision-Making   MEDIUM  Complexity : 1-2 comorbidities / personal factors will impact the outcome/ POC  MEDIUM Complexity : 3 Standardized tests and measures addressing body structure, function, activity limitation and / or participation in recreation  MEDIUM Complexity : Evolving with changing characteristics  MEDIUM Complexity : FOTO score of 26-74      Based on the above components, the patient evaluation is determined to be of the following complexity level: MEDIUM     Pain:  Pain Scale 1: Visual (pt resting)  Pain Intensity 1: 0  Activity Tolerance:      Please refer to the flowsheet for vital signs taken during this treatment. After treatment:   []         Patient left in no apparent distress sitting up in chair  [x]         Patient left in no apparent distress in bed  [x]         Call bell left within reach  [x]         Nursing notified  []         Caregiver present  []         Bed alarm activated     COMMUNICATION/EDUCATION:   The patients plan of care was discussed with: Registered Nurse. [x]         Fall prevention education was provided and the patient/caregiver indicated understanding. [x]         Patient/family have participated as able in goal setting and plan of care. [x]         Patient/family agree to work toward stated goals and plan of care. []         Patient understands intent and goals of therapy, but is neutral about his/her participation.   []         Patient is unable to participate in goal setting and plan of care.     Thank you for this referral.  Vincent Trevino, PT, DPT   Time Calculation: 23 mins

## 2018-03-12 NOTE — PROGRESS NOTES
Unable to do a full assessment on the pt, she refuses to be moved, turned or fee lifted. Only wants to stay in one position. Pt educated on importance of turning and skin care but refuses to oblige. Will continue to educate and monitor.

## 2018-03-12 NOTE — PROGRESS NOTES
Progress Note    Patient: Maria Luz Blevins MRN: [id number]  SSN: [id number]    YOB: 1975  Age: 38 y.o. Sex: female      Admit Date: 3/7/2018    5 Days Post-Op    Procedure:  Procedure(s):  EXPLORATORY LAPAROTOMY, LYSIS OF ADHESION X 5HOURS, SMALL BOWEL FISTULA TAKE DOWN X 2 AND WOUND VAC PLACEMENT    Subjective:     No acute surgical issues. Pt reported soreness to abdomen. Pt also reported some nausea but no vomiting. Ileostomy not yet productive. Hemoglobin is stable and no evidence of active bleeding.       Objective:     Visit Vitals    /76 (BP 1 Location: Left arm, BP Patient Position: At rest)    Pulse (!) 131    Temp 98.4 °F (36.9 °C)    Resp 21    Ht 5' 4\" (1.626 m)    Wt 344 lb 9.3 oz (156.3 kg)    SpO2 98%    BMI 59.15 kg/m2       Temp (24hrs), Av.8 °F (37.1 °C), Min:98.4 °F (36.9 °C), Max:99.4 °F (37.4 °C)        Physical Exam:    Gen:  NAD  Pulm:  Unlabored  Abd:  S/ND/appropriate TTP  Wound with serosanguinous drainage    Recent Results (from the past 24 hour(s))   GLUCOSE, POC    Collection Time: 18 11:50 AM   Result Value Ref Range    Glucose (POC) 135 (H) 65 - 100 mg/dL    Performed by Albino Arce    GLUCOSE, POC    Collection Time: 18  5:48 PM   Result Value Ref Range    Glucose (POC) 131 (H) 65 - 100 mg/dL    Performed by REYES ALEXIS    CBC W/O DIFF    Collection Time: 18  6:32 PM   Result Value Ref Range    WBC 7.5 3.6 - 11.0 K/uL    RBC 2.29 (L) 3.80 - 5.20 M/uL    HGB 7.5 (L) 11.5 - 16.0 g/dL    HCT 22.2 (L) 35.0 - 47.0 %    MCV 96.9 80.0 - 99.0 FL    MCH 32.8 26.0 - 34.0 PG    MCHC 33.8 30.0 - 36.5 g/dL    RDW 20.4 (H) 11.5 - 14.5 %    PLATELET 86 (L) 364 - 400 K/uL    MPV 11.0 8.9 - 12.9 FL    NRBC 2.1 (H) 0  WBC    ABSOLUTE NRBC 0.16 (H) 0.00 - 0.01 K/uL   GLUCOSE, POC    Collection Time: 18 12:03 AM   Result Value Ref Range    Glucose (POC) 140 (H) 65 - 100 mg/dL    Performed by Bree Scheuermann    METABOLIC PANEL, BASIC Collection Time: 03/12/18  2:21 AM   Result Value Ref Range    Sodium 142 136 - 145 mmol/L    Potassium 3.7 3.5 - 5.1 mmol/L    Chloride 112 (H) 97 - 108 mmol/L    CO2 24 21 - 32 mmol/L    Anion gap 6 5 - 15 mmol/L    Glucose 141 (H) 65 - 100 mg/dL    BUN 7 6 - 20 MG/DL    Creatinine 0.61 0.55 - 1.02 MG/DL    BUN/Creatinine ratio 11 (L) 12 - 20      GFR est AA >60 >60 ml/min/1.73m2    GFR est non-AA >60 >60 ml/min/1.73m2    Calcium 7.8 (L) 8.5 - 10.1 MG/DL   MAGNESIUM    Collection Time: 03/12/18  2:21 AM   Result Value Ref Range    Magnesium 1.8 1.6 - 2.4 mg/dL   PHOSPHORUS    Collection Time: 03/12/18  2:21 AM   Result Value Ref Range    Phosphorus 2.4 (L) 2.6 - 4.7 MG/DL   CBC W/O DIFF    Collection Time: 03/12/18  2:21 AM   Result Value Ref Range    WBC 8.3 3.6 - 11.0 K/uL    RBC 2.49 (L) 3.80 - 5.20 M/uL    HGB 7.9 (L) 11.5 - 16.0 g/dL    HCT 24.3 (L) 35.0 - 47.0 %    MCV 97.6 80.0 - 99.0 FL    MCH 31.7 26.0 - 34.0 PG    MCHC 32.5 30.0 - 36.5 g/dL    RDW 20.7 (H) 11.5 - 14.5 %    PLATELET 88 (L) 166 - 400 K/uL    MPV 10.9 8.9 - 12.9 FL    NRBC 2.7 (H) 0  WBC    ABSOLUTE NRBC 0.22 (H) 0.00 - 0.01 K/uL   GLUCOSE, POC    Collection Time: 03/12/18  5:30 AM   Result Value Ref Range    Glucose (POC) 170 (H) 65 - 100 mg/dL    Performed by [id number] [facility]:     Hospital Problems  Date Reviewed: 10/27/2017          Codes Class Noted POA    Small bowel fistula ICD-10-CM: K63.2  ICD-9-CM: 569.81  3/7/2018 Unknown              Plan/Recommendations/Medical Decision Making:     - Continue antibiotic therapy  - Acute anemia:  Most likely oozing from Plavix. Will hold plavix for now and check with vascular surgery about continuing plavix. Resume Lovenox.  - NPO with sips   - - Labs in am  - Palliative consult for pain control  - Upper GI with small bowel follow through tomorrow  - Local wound care with wet to dry dressing daily.  Wound care to assess patient tomorrow for wound vac replacement  - Continue antibiotic therapy    Signed By: Joaquim Young MD     March 12, 2018

## 2018-03-12 NOTE — PROGRESS NOTES
CM continues to follow for transition of care needs. . Attended [facility] rounds this am.    See CM note 3/9/18 explanation of services-- TPN is supplied by [id number] [phone] (fax [phone]) and patient is open to [address]-- resumption of order will be secured prior to discharge,  with specific orders of what services are needed. No new referral needed per Laure Salas liaison, CM should just call to inform the agency that patient is ready for discharge and resumption order for [facility]. Not determined of patient will need home wound vac from [facility]-- CM will assist with completing paperwork if needed. Patient lives at home with her mother, Amelia Moore [phone]. Yeimi Mahan UPDATE 9:50 am CM talked with Dr Tiffanie Castañeda regarding transition of care planning for patient. Length of stay for patient will be another 5-7 days. He hopes patient will NOT require TPN when discharged. He is not sure about the wound vac. He will inform CM if wound vac will be needed when discharged. Wound care is following. He will write resumption of HH order with specific home health services prior to discharge.

## 2018-03-13 ENCOUNTER — APPOINTMENT (OUTPATIENT)
Dept: GENERAL RADIOLOGY | Age: 43
DRG: 330 | End: 2018-03-13
Attending: SURGERY
Payer: MEDICARE

## 2018-03-13 LAB
ANION GAP SERPL CALC-SCNC: 8 MMOL/L (ref 5–15)
BUN SERPL-MCNC: 11 MG/DL (ref 6–20)
BUN/CREAT SERPL: 16 (ref 12–20)
CALCIUM SERPL-MCNC: 8.1 MG/DL (ref 8.5–10.1)
CHLORIDE SERPL-SCNC: 110 MMOL/L (ref 97–108)
CO2 SERPL-SCNC: 24 MMOL/L (ref 21–32)
CREAT SERPL-MCNC: 0.67 MG/DL (ref 0.55–1.02)
ERYTHROCYTE [DISTWIDTH] IN BLOOD BY AUTOMATED COUNT: 20.9 % (ref 11.5–14.5)
ERYTHROCYTE [DISTWIDTH] IN BLOOD BY AUTOMATED COUNT: 21.1 % (ref 11.5–14.5)
GLUCOSE BLD STRIP.AUTO-MCNC: 139 MG/DL (ref 65–100)
GLUCOSE BLD STRIP.AUTO-MCNC: 161 MG/DL (ref 65–100)
GLUCOSE BLD STRIP.AUTO-MCNC: 162 MG/DL (ref 65–100)
GLUCOSE BLD STRIP.AUTO-MCNC: 182 MG/DL (ref 65–100)
GLUCOSE BLD STRIP.AUTO-MCNC: 185 MG/DL (ref 65–100)
GLUCOSE SERPL-MCNC: 180 MG/DL (ref 65–100)
HCT VFR BLD AUTO: 22.8 % (ref 35–47)
HCT VFR BLD AUTO: 23.5 % (ref 35–47)
HGB BLD-MCNC: 7.4 G/DL (ref 11.5–16)
HGB BLD-MCNC: 7.8 G/DL (ref 11.5–16)
MAGNESIUM SERPL-MCNC: 2 MG/DL (ref 1.6–2.4)
MCH RBC QN AUTO: 32.2 PG (ref 26–34)
MCH RBC QN AUTO: 33.1 PG (ref 26–34)
MCHC RBC AUTO-ENTMCNC: 32.5 G/DL (ref 30–36.5)
MCHC RBC AUTO-ENTMCNC: 33.2 G/DL (ref 30–36.5)
MCV RBC AUTO: 99.1 FL (ref 80–99)
MCV RBC AUTO: 99.6 FL (ref 80–99)
NRBC # BLD: 0.29 K/UL (ref 0–0.01)
NRBC # BLD: 0.43 K/UL (ref 0–0.01)
NRBC BLD-RTO: 3.6 PER 100 WBC
NRBC BLD-RTO: 4.3 PER 100 WBC
PHOSPHATE SERPL-MCNC: 2.9 MG/DL (ref 2.6–4.7)
PLATELET # BLD AUTO: 102 K/UL (ref 150–400)
PLATELET # BLD AUTO: 117 K/UL (ref 150–400)
PMV BLD AUTO: 10.8 FL (ref 8.9–12.9)
PMV BLD AUTO: 11.4 FL (ref 8.9–12.9)
POTASSIUM SERPL-SCNC: 4.1 MMOL/L (ref 3.5–5.1)
RBC # BLD AUTO: 2.3 M/UL (ref 3.8–5.2)
RBC # BLD AUTO: 2.36 M/UL (ref 3.8–5.2)
SERVICE CMNT-IMP: ABNORMAL
SODIUM SERPL-SCNC: 142 MMOL/L (ref 136–145)
WBC # BLD AUTO: 10.1 K/UL (ref 3.6–11)
WBC # BLD AUTO: 8 K/UL (ref 3.6–11)

## 2018-03-13 PROCEDURE — 74011636637 HC RX REV CODE- 636/637: Performed by: SURGERY

## 2018-03-13 PROCEDURE — 84100 ASSAY OF PHOSPHORUS: CPT | Performed by: SURGERY

## 2018-03-13 PROCEDURE — 65660000000 HC RM CCU STEPDOWN

## 2018-03-13 PROCEDURE — 74011250636 HC RX REV CODE- 250/636: Performed by: PHYSICAL MEDICINE & REHABILITATION

## 2018-03-13 PROCEDURE — 83735 ASSAY OF MAGNESIUM: CPT | Performed by: SURGERY

## 2018-03-13 PROCEDURE — 3331090001 HH PPS REVENUE CREDIT

## 2018-03-13 PROCEDURE — 80048 BASIC METABOLIC PNL TOTAL CA: CPT | Performed by: SURGERY

## 2018-03-13 PROCEDURE — 36415 COLL VENOUS BLD VENIPUNCTURE: CPT | Performed by: SURGERY

## 2018-03-13 PROCEDURE — 74011000258 HC RX REV CODE- 258: Performed by: SURGERY

## 2018-03-13 PROCEDURE — 82962 GLUCOSE BLOOD TEST: CPT

## 2018-03-13 PROCEDURE — 85027 COMPLETE CBC AUTOMATED: CPT | Performed by: SURGERY

## 2018-03-13 PROCEDURE — 74011250636 HC RX REV CODE- 250/636: Performed by: SURGERY

## 2018-03-13 PROCEDURE — 3331090002 HH PPS REVENUE DEBIT

## 2018-03-13 PROCEDURE — 77030018836 HC SOL IRR NACL ICUM -A

## 2018-03-13 RX ADMIN — HYDROMORPHONE HYDROCHLORIDE 2 MG: 2 INJECTION INTRAMUSCULAR; INTRAVENOUS; SUBCUTANEOUS at 19:08

## 2018-03-13 RX ADMIN — Medication 10 ML: at 14:08

## 2018-03-13 RX ADMIN — Medication: at 08:39

## 2018-03-13 RX ADMIN — ONDANSETRON HYDROCHLORIDE 4 MG: 2 INJECTION INTRAMUSCULAR; INTRAVENOUS at 14:07

## 2018-03-13 RX ADMIN — HYDROMORPHONE HYDROCHLORIDE 2 MG: 2 INJECTION INTRAMUSCULAR; INTRAVENOUS; SUBCUTANEOUS at 14:07

## 2018-03-13 RX ADMIN — Medication: at 14:54

## 2018-03-13 RX ADMIN — HYDROMORPHONE HYDROCHLORIDE 2 MG: 2 INJECTION INTRAMUSCULAR; INTRAVENOUS; SUBCUTANEOUS at 09:54

## 2018-03-13 RX ADMIN — HYDROMORPHONE HYDROCHLORIDE 2 MG: 2 INJECTION INTRAMUSCULAR; INTRAVENOUS; SUBCUTANEOUS at 22:26

## 2018-03-13 RX ADMIN — LORAZEPAM 1 MG: 2 INJECTION INTRAMUSCULAR; INTRAVENOUS at 19:08

## 2018-03-13 RX ADMIN — HYDROMORPHONE HYDROCHLORIDE 2 MG: 2 INJECTION INTRAMUSCULAR; INTRAVENOUS; SUBCUTANEOUS at 02:12

## 2018-03-13 RX ADMIN — PROMETHAZINE HYDROCHLORIDE 12.5 MG: 25 INJECTION INTRAMUSCULAR; INTRAVENOUS at 23:35

## 2018-03-13 RX ADMIN — LORAZEPAM 1 MG: 2 INJECTION INTRAMUSCULAR; INTRAVENOUS at 09:54

## 2018-03-13 RX ADMIN — Medication 10 ML: at 06:34

## 2018-03-13 RX ADMIN — Medication 10 ML: at 21:05

## 2018-03-13 RX ADMIN — Medication 10 ML: at 21:04

## 2018-03-13 RX ADMIN — PROMETHAZINE HYDROCHLORIDE 12.5 MG: 25 INJECTION INTRAMUSCULAR; INTRAVENOUS at 06:55

## 2018-03-13 RX ADMIN — NYSTATIN: 100000 CREAM TOPICAL at 19:10

## 2018-03-13 RX ADMIN — ONDANSETRON HYDROCHLORIDE 4 MG: 2 INJECTION INTRAMUSCULAR; INTRAVENOUS at 21:03

## 2018-03-13 RX ADMIN — PROCHLORPERAZINE EDISYLATE 5 MG: 5 INJECTION INTRAMUSCULAR; INTRAVENOUS at 03:51

## 2018-03-13 RX ADMIN — PROCHLORPERAZINE EDISYLATE 5 MG: 5 INJECTION INTRAMUSCULAR; INTRAVENOUS at 11:28

## 2018-03-13 RX ADMIN — ONDANSETRON HYDROCHLORIDE 4 MG: 2 INJECTION INTRAMUSCULAR; INTRAVENOUS at 09:46

## 2018-03-13 RX ADMIN — PROCHLORPERAZINE EDISYLATE 5 MG: 5 INJECTION INTRAMUSCULAR; INTRAVENOUS at 18:35

## 2018-03-13 RX ADMIN — HYDROMORPHONE HYDROCHLORIDE 2 MG: 2 INJECTION INTRAMUSCULAR; INTRAVENOUS; SUBCUTANEOUS at 06:33

## 2018-03-13 RX ADMIN — Medication: at 04:42

## 2018-03-13 RX ADMIN — INSULIN LISPRO 3 UNITS: 100 INJECTION, SOLUTION INTRAVENOUS; SUBCUTANEOUS at 06:34

## 2018-03-13 NOTE — PROGRESS NOTES
Progress Note    Patient: Rd Lemus MRN: [id number]  SSN: [id number]    YOB: 1975  Age: 38 y.o. Sex: female      Admit Date: 3/7/2018    6 Days Post-Op    Procedure:  Procedure(s):  EXPLORATORY LAPAROTOMY, LYSIS OF ADHESION X 5HOURS, SMALL BOWEL FISTULA TAKE DOWN X 2 AND WOUND VAC PLACEMENT    Subjective:     Patient with nausea and vomiting this morning. Patient and patient's mother stated had projectile vomiting twice last night. Patient stated feeling really weak. No shortness of breath and chest pain. Voiding. Hemoglobin stable this AM.    Objective:     Visit Vitals    /60 (BP 1 Location: Left arm, BP Patient Position: At rest)    Pulse (!) 121    Temp 98.8 °F (37.1 °C)    Resp 18    Ht 5' 4\" (1.626 m)    Wt 320 lb 5.3 oz (145.3 kg)    SpO2 99%    BMI 54.98 kg/m2       Temp (24hrs), Av.7 °F (37.1 °C), Min:98.3 °F (36.8 °C), Max:98.9 °F (37.2 °C)      Physical Exam:    LUNG: clear to auscultation bilaterally, HEART: regular rhythm, sinus tachyABDOMEN: soft, non-distended, generalized tenderness. Bowel sounds hypoactive. Ostomy with pink stoma, yet no output. Presence of bile emesis. Large abdominal wound with dressing with sero sang output. Drain to [facility] with moderate sero-sang output EXTREMITIES:  extremities normal, atraumatic, no cyanosis or edema.          Lab Review:   BMP:   Lab Results   Component Value Date/Time     2018 03:34 AM    K 4.1 2018 03:34 AM     (H) 2018 03:34 AM    CO2 24 2018 03:34 AM    AGAP 8 2018 03:34 AM     (H) 2018 03:34 AM    BUN 11 2018 03:34 AM    CREA 0.67 2018 03:34 AM    GFRAA >60 2018 03:34 AM    GFRNA >60 2018 03:34 AM     CMP:   Lab Results   Component Value Date/Time     2018 03:34 AM    K 4.1 2018 03:34 AM     (H) 2018 03:34 AM    CO2 24 2018 03:34 AM    AGAP 8 2018 03:34 AM     (H) 2018 03:34 AM    BUN 11 03/13/2018 03:34 AM    CREA 0.67 03/13/2018 03:34 AM    GFRAA >60 03/13/2018 03:34 AM    GFRNA >60 03/13/2018 03:34 AM    CA 8.1 (L) 03/13/2018 03:34 AM    MG 2.0 03/13/2018 03:34 AM    PHOS 2.9 03/13/2018 03:34 AM     CBC:   Lab Results   Component Value Date/Time    WBC 8.0 03/13/2018 03:34 AM    HGB 7.4 (L) 03/13/2018 03:34 AM    HCT 22.8 (L) 03/13/2018 03:34 AM     (L) 03/13/2018 03:34 AM       Assessment:     Hospital Problems  Date Reviewed: 10/27/2017          Codes Class Noted POA    Small bowel fistula ICD-10-CM: K63.2  ICD-9-CM: 569.81  3/7/2018 Unknown              Plan/Recommendations/Medical Decision Making:     Continue present treatment  Patient NPO. Anti-emetics. Will cancel Upper GI for now  Continue pain medication regime, bolus dose of Dilaudid  Now dose of Ativan. Continue TPN. Continue current wound care orders. Wound care to see today for possible placement of wound vac. Labs in AM  Continue IV fluids. Further plans per Dr. Mary Lake By: Emelina Jackman NP     March 13, 2018         ADDENDUM:  Yaakov Lamas MD  Pt seen and examined. No acute surgical issues. Abdominal wound without active bleeding. Small hematoma removed from left side of abdomen. Pt was unable to tolerate upper GI study due to nausea and vomiting. Minimal output from ostomy. Hemoglobin otherwise stable. Continue Lovenox therapy and hold Plavix for now. Continue antibiotic therapy. AXR in am.  Wound care to see patient tomorrow for possible wound vac placement. ADDENDUM FOR CODING QUERY:    Acute anemia: This was secondary to bleeding from wound bed from wound vac therapy and patient being on Plavix and Lovenox. Plavix was held and wound vac was removed. Patient was transfused 2 units PRBC    Hypokalemia:  Secondary to chronic short gut syndrome.   This was repleted with TPN and potassium repletion

## 2018-03-13 NOTE — WOUND CARE
WOCN Note:     Follow-up visit for dressing change. Chart shows:  Admitted for fistula takdown 3/7/18 by Dr. Jeff Greco with [facility] placement in [address] [place] [address]; history of multiple abdominal surgeries and Crohns disease. Admitted from home.      Assessment:   Patient is A&O x 4 and mobile but debilitated s/p abdominal surgery. Bed: total care - Blake Glaser called and total care bariatric bed ordered & spoke with Mauro who reports he is able to deliver this bed. Pure Wick in use. Using PCA for pain. Patient and RN report projectile vomiting.      1. Midline abdominal surgical wound (see previous note for measurements). 80% freshly excised subcutaneous tissue with smooth tubular bleb ~ 6 cm in length @ 2 o'clock on wall of large open abdominal wound. The top of this tubular tissue is tan on the top and on the medial side is dark burgundy. There is gauze packing with clotted blood attached to this tissue and I did not disrupt this. Remnants of surgicel in base. Slight malodor noted today. Neo Allan is at bedside during dressing change and observed the wound bed. She and I discussed findings with Dr. Jeff Greco by phone. No active bleeding noted but the dressing I removed was completely saturated with serosanguinous exudate. Saline-moistened gauze x 2 kerlix placed in wound, dry ABD's and secured. Ostomy: stoma is moist & pink; no output. Wound Recommendations:    Maintain moist packing and reinforce as needed until seen by Dr. Jeff Greco. Skin Care & Pressure Relief Recommendations:  Minimize layers of linen/pads under patient to optimize support surface. Turn/reposition approximately every 2 hours and offload heels. Bariatric bed. Discussed above plan with patient, her mother & Neo Allan, NP.     Transition of Care: Plan to follow as needed while admitted to hospital.    Masoud Gil, SONY, RN, [facility]  Certified Wound, Ostomy, Continence Nurse  office [phone]  pager [phone] or call  to page

## 2018-03-13 NOTE — CDMP QUERY
Please clarify if this patient is (was) being treated/managed for:     => Hypokalemia in the post-op setting requiring replacement potassium  => Other explanation of clinical findings  => Clinically Undetermined (no explanation for clinical findings)    The medical record reflects the following clinical findings, treatment, and risk factors. Risk Factors:  post op  Clinical Indicators:  serum potassium 2.7  Treatment: supplemental potassium    Please clarify and document your clinical opinion in the progress notes and discharge summary including the definitive and/or presumptive diagnosis, (suspected or probable), related to the above clinical findings. Please include clinical findings supporting your diagnosis.     Thank you,  Carley Galindo, RN  [phone]

## 2018-03-13 NOTE — ROUTINE PROCESS
Bedside and Verbal shift change report given to [facility] 7715 (oncoming nurse) by Pam Howell (offgoing nurse). Report included the following information SBAR, Kardex, Intake/Output, MAR, Recent Results and Cardiac Rhythm -130s.

## 2018-03-13 NOTE — PROGRESS NOTES
Problem: Falls - Risk of  Goal: *Absence of Falls  Document Marlene Fall Risk and appropriate interventions in the flowsheet. Outcome: Progressing Towards Goal  Fall Risk Interventions:  Mobility Interventions: Communicate number of staff needed for ambulation/transfer, Mechanical lift, PT Consult for mobility concerns         Medication Interventions: Teach patient to arise slowly, Patient to call before getting OOB    Elimination Interventions: Call light in reach, Toileting schedule/hourly rounds    History of Falls Interventions: Door open when patient unattended        Problem: Risk for Spread of Infection  Goal: Prevent transmission of infectious organism to others  Prevent the transmission of infectious organisms to other patients, staff members, and visitors.    Outcome: Progressing Towards Goal  Contact isolation

## 2018-03-13 NOTE — PROGRESS NOTES
0930 - Pt's mother reported that pt had emesis x1. RN assessed pt and noted dark green emesis (~200ml). Pt reporting continued nausea, anxiety, and breakthrough pain. PRN zofran, dilaudid, and ativan given. 1030 - Wound care RN Yung Ramos at bedside to change abdominal dressing. Per her note, abnormal findings reported to Dr. Susana Palacio and Macie Mitchell NP.     1200 - Emesis x1. Watery consistency and dark green in color. Per NP, hold TPN until further notice and start pt on lactated ringers continuous @125ml/hr. 1400 - Dr. Susana Palacio at bedside. Large hematoma pulled from pt's abdomen. Wound redressed. Rechecked BG 2H after stopping TPN, and BG had dropped from 182 to 161. Notified NP who asked that we hold insulin while holding TPN to avoid hypoglycemic episodes. 1800 - Emesis x1. Watery consistency and dark green in color. 1930 - Bedside shift change report given to [facility] RN (oncoming nurse) by Italia Douglas RN (offgoing nurse). Report included the following information SBAR, Kardex, ED Summary, OR Summary, Procedure Summary, Intake/Output, MAR, Recent Results and Med Rec Status. Opportunity for questions provided  q1h rounds completed during shift.

## 2018-03-13 NOTE — PROGRESS NOTES
Palliative Medicine Consult  [place]: [phone] ([phone])    Patient Name: Genna Salas  YOB: 1975    Date of Initial Consult: 3/12/18  Reason for Consult: Pain management, chronic and post op   Requesting Provider: Rajni   Primary Care Physician: Javier Huber MD     SUMMARY:   Genna Salas is a 38 y.o. with a past history of Crohn's disease (followed by Dr Norm Quan), mult surgeries s/p total colectomy w/ end ileostomy, 6/2017 ex lap with small bowel perforation, 6/28/17 ex lap, LPA, repair or enterotomy, SMA stent on L, 7/16/2917 ex lap, KATHRINE, fistula exclusion w/ feeding tube placement and prolonged TPN who was admitted on 3/7/2018 from home for planned surgery, s/p ex lap w/ extensive KATHRINE and small bowel fistula take down, wound vac placement. Known to our team during past hospitalizations. Has had lengthy hospital stays w/ mult complications. Has required [facility] stays in past. Has chronic pain from Crohn's disease (and has not been able to tolerate home Crohn's meds recently due to acute issues- had been on steroids, stopped prior to surgery). Discussion of Remicaide in future. Current medical issues leading to Palliative Medicine involvement include: pain management. Patient's mother, Mazin Jackson is NOK.  reviewed, no abberrancies- one prescriber for opioids and one pharmacy. Most recently filled 3/9/18: MSContin 100mg tid and Morphine IR 30mg- 8 tabs a day. PALLIATIVE DIAGNOSES:   1. Acute post operative pain in abdomen  2. Chronic abdominal pain from Cronh's disease and hx of surgeries  3. Nausea  4. Feeding difficulties due to medical condition- has been on TPN for several months  5. Edema   6. L ankle pain s/p fall, normal XRs  7. Grief/depressive sx   8. Constipation      PLAN:   1. Pt had rough night w/ significant emesis last night. Started prior to [employer], so don't think it is just from addition of medication- but concerned that is not absorbing. 2. Discussed w/ pt and mom about starting low dose basal on PCA. Has not benefited from Fentanyl patch, even at higher doses than MSContin in past.   3. Stop MSContin for now. 4. Cont Dilaudid PCA but add basal - 0.3mg/h, 0.4 mg every 6 min demand. 5. Note that this basal rate is very conservative, if truly needed we can increase. (home dose of MSContin 300mg daily, even reduced by 25% for incomplete cross tolerance would come out to approx 0.6mg/h). 6. Can keep prn Dilaudid 2mg IV every 3h for breakthrough dose. 7. Following to assist w/ transition to po medication/home regimen as heals. 8. Bowel regimen per surgery. 9. Following for support. 10. Communicated plan of care with: Palliative IDT       GOALS OF CARE / TREATMENT PREFERENCES:     GOALS OF CARE:  Patient/Health Care Proxy Stated Goals:  (Sx management )      TREATMENT PREFERENCES:   Code Status: Full Code    Advance Care Planning:  Advance Care Planning 3/8/2018   Patient's Healthcare Decision Maker is: Named in scanned ACP document   Primary Decision Maker Name Ava Sharp   Primary Decision Maker Phone Number [phone]   Primary Decision Maker Relationship to Patient Parent   Confirm Advance Directive Yes, on file   Patient Would Like to Complete Advance Directive No   Does the patient have other document types Power of        Medical Interventions: Full interventions           Other:    As far as possible, the palliative care team has discussed with patient / health care proxy about goals of care / treatment preferences for patient. HISTORY:         HPI/SUBJECTIVE:    The patient is:   [x] Verbal and participatory  [] Non-participatory due to:     Pt had a rough night w/ mult episodes of emesis, is tearful this AM. Pain significant, worse today now that she vomitied.        Meds for pain and sx incl:  MsContin 100mg tid   Scopolamine patch   Dilaudid PCA, no basal, 0.3mg IV every 6 min demand  Marinol   Dilaudid 2mg IV every 3h prn breakthrough from PCA  Ativan 1mg IV every 6h prn    Clinical Pain Assessment (nonverbal scale for severity on nonverbal patients):   Clinical Pain Assessment  Severity: 8  Location: Abdomen   Character: Sharp and aching   Duration: chronic and since surgery   Effect: harder to move   Factors: worse w/ movement and wound care  Frequency: constant          Duration: for how long has pt been experiencing pain (e.g., 2 days, 1 month, years)  Frequency: how often pain is an issue (e.g., several times per day, once every few days, constant)     FUNCTIONAL ASSESSMENT:     Palliative Performance Scale (PPS):  PPS: 60       PSYCHOSOCIAL/SPIRITUAL SCREENING:     Palliative IDT has assessed this patient for cultural preferences / practices and a referral made as appropriate to needs (Cultural Services, Patient Advocacy, Ethics, etc.)    Advance Care Planning:  Advance Care Planning 3/8/2018   Patient's Healthcare Decision Maker is: Named in scanned ACP document   Primary Decision Maker Name Derrick Redding   Primary Decision Maker Phone Number [phone]   Primary Decision Maker Relationship to Patient Parent   Confirm Advance Directive Yes, on file   Patient Would Like to Complete Advance Directive No   Does the patient have other document types Power of        Any spiritual / Mu-ism concerns:  [] Yes /  [x] No    Caregiver Burnout:  [] Yes /  [x] No /  [] No Caregiver Present      Anticipatory grief assessment:   [x] Normal  / [] Maladaptive       ESAS Anxiety: Anxiety: 4    ESAS Depression: Depression: 2        REVIEW OF SYSTEMS:     Positive and pertinent negative findings in ROS are noted above in HPI. The following systems were [x] reviewed / [] unable to be reviewed as noted in HPI  Other findings are noted below. Systems: constitutional, ears/nose/mouth/throat, respiratory, gastrointestinal, genitourinary, musculoskeletal, integumentary, neurologic, psychiatric, endocrine.  Positive findings noted below. Modified ESAS Completed by: provider   Fatigue: 3 Drowsiness: 0   Depression: 2 Pain: 8   Anxiety: 4 Nausea: 4   Anorexia: 8 Dyspnea: 0     Constipation: Yes              PHYSICAL EXAM:     From RN flowsheet:  Wt Readings from Last 3 Encounters:   03/13/18 320 lb 5.3 oz (145.3 kg)   03/05/18 304 lb 3.2 oz (138 kg)   02/28/18 306 lb (138.8 kg)     Blood pressure 106/60, pulse (!) 121, temperature 98.8 °F (37.1 °C), resp. rate 18, height 5' 4\" (1.626 m), weight 320 lb 5.3 oz (145.3 kg), SpO2 99 %. Pain Scale 1: Numeric (0 - 10)  Pain Intensity 1: 7  Pain Onset 1: post op  Pain Location 1: Abdomen  Pain Orientation 1: Anterior  Pain Description 1: Aching  Pain Intervention(s) 1: Encouraged PCA  Last bowel movement, if known: prior to surgery     Constitutional: awake, alert, oriented, no sedation or confusion   Eyes: pupils equal, anicteric  ENMT: no nasal discharge, moist mucous membranes  Cardiovascular: regular rhythm, tachycardic   Respiratory: breathing not labored, symmetric  Gastrointestinal: soft , TTP, hypoactive bowel sounds, ostomy site pink, midline incisions c/d/i  Musculoskeletal: no deformity, TTP over L ankle  Skin: warm, dry, pale  Neurologic: following commands, moving all extremities  Psychiatric: full affect, no hallucinations         HISTORY:     Active Problems:    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)      Past Medical History:   Diagnosis Date    Adverse effect of anesthesia     WOKE DURING SURGERY    Arthritis     Blood clot in vein 2017    STOMACH    Crohn's disease ([address] 8/15/2011    DVT (deep venous thrombosis) ([address] 8/15/2011    LEFT LEG    Edema     GENERALIZED R/T TPN; WAIST DOWN    Incarcerated ventral hernia 8/15/2011    Lupus     Lyme disease     Psychiatric disorder     ANXIETY    Right flank pain 8/22/2011    Seizures ([address] 1990    LAST AT AGE 17    Stroke [facility]) 1990    age 17;  A WEEK POST OP, HAD SEIZURES AND STROKE, WAS IN COMA FOR A MONTH    Thyroid disease HYPO-NO MEDS      Past Surgical History:   Procedure Laterality Date    HX APPENDECTOMY      HX GYN  2015    HIDRADENTIS LABIA    HX OTHER SURGICAL      multiple procedures related to her Crohn's disease    HX OTHER SURGICAL      ABDOMINAL SURGERY REMOVING COLON, 2/3 STOMACH, 1/3 SMALL INTESTINE    OH LAP, INCISIONAL HERNIA REPAIR,INCARCERATED  9-10-08    dr. Shabana Carpio      Family History   Problem Relation Age of Onset    Hypertension Father     Stroke Father     Other Father      arthritis    Arthritis-osteo Father     Hypertension Mother     Arthritis-osteo Mother     Anesth Problems Neg Hx       History reviewed, no pertinent family history.   Social History   Substance Use Topics    Smoking status: Former Smoker     Packs/day: 1.00     Years: 16.00     Quit date: 2/27/2017    Smokeless tobacco: Former User      Comment: OFF AND ON    Alcohol use No     Allergies   Allergen Reactions    Sulfa (Sulfonamide Antibiotics) Anaphylaxis    Demerol [Meperidine] Rash    Soma [Carisoprodol] Rash and Nausea Only    Toradol [Ketorolac Tromethamine] Nausea and Vomiting      Current Facility-Administered Medications   Medication Dose Route Frequency    iohexol (OMNIPAQUE) 300 mg iodine/mL contrast injection 300 mL  300 mL Oral RAD ONCE    TPN ADULT - CENTRAL   IntraVENous CONTINUOUS    TPN ADULT - CENTRAL   IntraVENous CONTINUOUS    nystatin (MYCOSTATIN) 100,000 unit/gram cream   Topical BID    prochlorperazine (COMPAZINE) with saline injection 5 mg  5 mg IntraVENous Q6H PRN    naloxone (NARCAN) injection 0.4 mg  0.4 mg IntraVENous EVERY 2 MINUTES AS NEEDED    HYDROmorphone (DILAUDID) injection 2 mg  2 mg IntraVENous Q3H PRN    0.9% sodium chloride infusion 250 mL  250 mL IntraVENous PRN    fat emulsion 20% (LIPOSYN, INTRAlipid) infusion  50 mL/hr IntraVENous Q MON, WED & FRI    glucose chewable tablet 16 g  4 Tab Oral PRN    dextrose (D50W) injection syrg 12.5-25 g  12.5-25 g IntraVENous PRN    glucagon (GLUCAGEN) injection 1 mg  1 mg IntraMUSCular PRN    insulin lispro (HUMALOG) injection   SubCUTAneous Q6H    enoxaparin (LOVENOX) injection 135 mg  135 mg SubCUTAneous Q12H    scopolamine (TRANSDERM-SCOP) 1 mg over 3 days 1 Patch  1 Patch TransDERmal Q72H    sodium chloride (NS) flush 20 mL  20 mL InterCATHeter PRN    sodium chloride (NS) flush 10 mL  10 mL InterCATHeter Q24H    sodium chloride (NS) flush 10 mL  10 mL InterCATHeter PRN    sodium chloride (NS) flush 10 mL  10 mL InterCATHeter Q8H    alteplase (CATHFLO) 1 mg in sterile water (preservative free) 1 mL injection  1 mg InterCATHeter PRN    bacitracin 500 unit/gram packet 1 Packet  1 Packet Topical PRN    sodium chloride (NS) flush 20 mL  20 mL InterCATHeter PRN    sodium chloride (NS) flush 10 mL  10 mL InterCATHeter Q24H    sodium chloride (NS) flush 10 mL  10 mL InterCATHeter PRN    sodium chloride (NS) flush 10 mL  10 mL InterCATHeter Q8H    dronabinol (MARINOL) capsule 2.5 mg  2.5 mg Oral BID    lactated Ringers infusion  25 mL/hr IntraVENous CONTINUOUS    HYDROmorphone (PF) 15 mg/30 ml (DILAUDID) PCA   IntraVENous CONTINUOUS    phenol throat spray (CHLORASEPTIC) 1 Spray  1 Spray Oral PRN    ondansetron (ZOFRAN) injection 4 mg  4 mg IntraVENous Q4H PRN    promethazine (PHENERGAN) 12.5 mg in 0.9% sodium chloride 50 mL IVPB  12.5 mg IntraVENous Q6H PRN    LORazepam (ATIVAN) injection 1 mg  1 mg IntraVENous Q6H PRN    albuterol (PROVENTIL VENTOLIN) nebulizer solution 2.5 mg  2.5 mg Nebulization Q4H PRN          LAB AND IMAGING FINDINGS:     Lab Results   Component Value Date/Time    WBC 8.0 03/13/2018 03:34 AM    HGB 7.4 (L) 03/13/2018 03:34 AM    PLATELET 577 (L) 22/01/6432 03:34 AM     Lab Results   Component Value Date/Time    Sodium 142 03/13/2018 03:34 AM    Potassium 4.1 03/13/2018 03:34 AM    Chloride 110 (H) 03/13/2018 03:34 AM    CO2 24 03/13/2018 03:34 AM    BUN 11 03/13/2018 03:34 AM    Creatinine 0.67 03/13/2018 03:34 AM    Calcium 8.1 (L) 03/13/2018 03:34 AM    Magnesium 2.0 03/13/2018 03:34 AM    Phosphorus 2.9 03/13/2018 03:34 AM      Lab Results   Component Value Date/Time    AST (SGOT) 33 03/08/2018 03:11 AM    Alk. phosphatase 80 03/08/2018 03:11 AM    Protein, total 5.1 (L) 03/08/2018 03:11 AM    Albumin 2.3 (L) 03/08/2018 03:11 AM    Globulin 2.8 03/08/2018 03:11 AM     Lab Results   Component Value Date/Time    INR 1.0 12/07/2017 08:37 AM    Prothrombin time 10.0 12/07/2017 08:37 AM      No results found for: IRON, FE, TIBC, IBCT, PSAT, FERR   No results found for: PH, PCO2, PO2  No components found for: GLPOC   No results found for: CPK, CKMB             Total time:   Counseling / coordination time, spent as noted above:   > 50% counseling / coordination?:     Prolonged service was provided for  []30 min   []75 min in face to face time in the presence of the patient, spent as noted above. Time Start:   Time End:   Note: this can only be billed with 55172 (initial) or 73803 (follow up). If multiple start / stop times, list each separately.

## 2018-03-13 NOTE — CDMP QUERY
There is noted documentation of Acute Anemia on this record. Could this be further clarified as    =>Acute Blood Loss Anemia in the post op setting requiring transfusions. =>Other Explanation of clinical findings  =>Unable to Determine (no explanation of clinical findings)    The medical record reflects the following clinical findings, treatment, and risk factors:    Risk Factors: post op, Plavix  Clinical Indicators: Hbg 5.2  Treatment: 2u blood transfused      Please clarify and document your clinical opinion in the progress notes and discharge summary including the definitive and/or presumptive diagnosis, (suspected or probable), related to the above clinical findings.  Please include clinical findings supporting your diagnosis    Thank you,  Johanna Hernandez, RN  [phone]

## 2018-03-13 NOTE — PROGRESS NOTES
Telephone call from patient, concern with patient bleeding from rectum, stated bright red blood with stool. Came to see patient, presence of old blood on bottom padding. Patient moved and checked for bleeding. No presence of blood from rectum, no active bleeding. Sero-sang drainage seeping from bottom of abdominal wound down to groin area from previosuly. Abdomen wound dressing intact at this time. Presence of very small amount of stool in ostomy at this time. STAT CBC ordered. Maintenance fluids at 125 mL/hr. Lovenox held. Dr. Liz Vasquez notified.

## 2018-03-13 NOTE — PROGRESS NOTES
03/13/18 0300   Vitals   Temp 98.7 °F (37.1 °C)   Temp Source Oral   Pulse (Heart Rate) (!) 125   Heart Rate Source Monitor   Resp Rate 20   O2 Sat (%) 98 %   Level of Consciousness Alert   /70   MAP (Calculated) 86   MEWS Score 3   HR baseline for pt.  md aware.  Will continue to monitor

## 2018-03-13 NOTE — PROGRESS NOTES
Care Management: plan is to continue current treatment. NPO: anti emetics : pain management: TPN: wound care and ? Wound vac placement : AM labs and maintain IV fluids for now. [address] can be resumed at discharge. No referral needed just a call  Dick Bourbon: [phone] let them know patient discharged. [facility] for TPN if patient goes home on TPN. To follow transition of care. 1257: not appropriate for PT screening today. Palliative following for pain management.     Michael Robles RN  BSN CM

## 2018-03-13 NOTE — PROGRESS NOTES
03/12/18 2301   Vitals   Temp 98.9 °F (37.2 °C)   Temp Source Oral   Pulse (Heart Rate) (!) 125   Heart Rate Source Monitor   Resp Rate 20   O2 Sat (%) 97 %   Level of Consciousness Alert   /73   MAP (Calculated) 87   Cardiac Rhythm Sinus Tach   MEWS Score 3   Pain 1   Pain Scale 1 Numeric (0 - 10)   Pain Intensity 1 8   Patient Stated Pain Goal 5   Pain Intervention(s) 1 Medication (see MAR)   baseline for pt.   Will continue to monitor

## 2018-03-14 ENCOUNTER — APPOINTMENT (OUTPATIENT)
Dept: GENERAL RADIOLOGY | Age: 43
DRG: 330 | End: 2018-03-14
Attending: SURGERY
Payer: MEDICARE

## 2018-03-14 LAB
ANION GAP SERPL CALC-SCNC: 9 MMOL/L (ref 5–15)
BUN SERPL-MCNC: 19 MG/DL (ref 6–20)
BUN/CREAT SERPL: 20 (ref 12–20)
CALCIUM SERPL-MCNC: 9 MG/DL (ref 8.5–10.1)
CHLORIDE SERPL-SCNC: 108 MMOL/L (ref 97–108)
CO2 SERPL-SCNC: 22 MMOL/L (ref 21–32)
CREAT SERPL-MCNC: 0.93 MG/DL (ref 0.55–1.02)
ERYTHROCYTE [DISTWIDTH] IN BLOOD BY AUTOMATED COUNT: 21.1 % (ref 11.5–14.5)
GLUCOSE BLD STRIP.AUTO-MCNC: 151 MG/DL (ref 65–100)
GLUCOSE BLD STRIP.AUTO-MCNC: 155 MG/DL (ref 65–100)
GLUCOSE BLD STRIP.AUTO-MCNC: 181 MG/DL (ref 65–100)
GLUCOSE BLD STRIP.AUTO-MCNC: 219 MG/DL (ref 65–100)
GLUCOSE SERPL-MCNC: 175 MG/DL (ref 65–100)
HCT VFR BLD AUTO: 32 % (ref 35–47)
HGB BLD-MCNC: 10.2 G/DL (ref 11.5–16)
MAGNESIUM SERPL-MCNC: 1.9 MG/DL (ref 1.6–2.4)
MCH RBC QN AUTO: 31.8 PG (ref 26–34)
MCHC RBC AUTO-ENTMCNC: 31.9 G/DL (ref 30–36.5)
MCV RBC AUTO: 99.7 FL (ref 80–99)
NRBC # BLD: 0.73 K/UL (ref 0–0.01)
NRBC BLD-RTO: 7.9 PER 100 WBC
PHOSPHATE SERPL-MCNC: 3.8 MG/DL (ref 2.6–4.7)
PLATELET # BLD AUTO: 119 K/UL (ref 150–400)
PMV BLD AUTO: 10.7 FL (ref 8.9–12.9)
POTASSIUM SERPL-SCNC: 4.9 MMOL/L (ref 3.5–5.1)
RBC # BLD AUTO: 3.21 M/UL (ref 3.8–5.2)
SERVICE CMNT-IMP: ABNORMAL
SODIUM SERPL-SCNC: 139 MMOL/L (ref 136–145)
WBC # BLD AUTO: 9.3 K/UL (ref 3.6–11)

## 2018-03-14 PROCEDURE — 74011250636 HC RX REV CODE- 250/636: Performed by: NURSE PRACTITIONER

## 2018-03-14 PROCEDURE — 74011250637 HC RX REV CODE- 250/637: Performed by: NURSE PRACTITIONER

## 2018-03-14 PROCEDURE — 74011250636 HC RX REV CODE- 250/636: Performed by: PHYSICAL MEDICINE & REHABILITATION

## 2018-03-14 PROCEDURE — 74011000250 HC RX REV CODE- 250: Performed by: SURGERY

## 2018-03-14 PROCEDURE — 74011636637 HC RX REV CODE- 636/637: Performed by: SURGERY

## 2018-03-14 PROCEDURE — 74018 RADEX ABDOMEN 1 VIEW: CPT

## 2018-03-14 PROCEDURE — 84100 ASSAY OF PHOSPHORUS: CPT | Performed by: SURGERY

## 2018-03-14 PROCEDURE — 80048 BASIC METABOLIC PNL TOTAL CA: CPT | Performed by: SURGERY

## 2018-03-14 PROCEDURE — 3331090001 HH PPS REVENUE CREDIT

## 2018-03-14 PROCEDURE — 85027 COMPLETE CBC AUTOMATED: CPT | Performed by: SURGERY

## 2018-03-14 PROCEDURE — 94640 AIRWAY INHALATION TREATMENT: CPT

## 2018-03-14 PROCEDURE — 74011000258 HC RX REV CODE- 258: Performed by: SURGERY

## 2018-03-14 PROCEDURE — 74011250636 HC RX REV CODE- 250/636: Performed by: SURGERY

## 2018-03-14 PROCEDURE — 65660000000 HC RM CCU STEPDOWN

## 2018-03-14 PROCEDURE — P9045 ALBUMIN (HUMAN), 5%, 250 ML: HCPCS | Performed by: SURGERY

## 2018-03-14 PROCEDURE — 83735 ASSAY OF MAGNESIUM: CPT | Performed by: SURGERY

## 2018-03-14 PROCEDURE — 74011250637 HC RX REV CODE- 250/637: Performed by: PHYSICIAN ASSISTANT

## 2018-03-14 PROCEDURE — 36415 COLL VENOUS BLD VENIPUNCTURE: CPT | Performed by: SURGERY

## 2018-03-14 PROCEDURE — 82962 GLUCOSE BLOOD TEST: CPT

## 2018-03-14 PROCEDURE — 3331090002 HH PPS REVENUE DEBIT

## 2018-03-14 PROCEDURE — 77030008771 HC TU NG SALEM SUMP -A

## 2018-03-14 RX ORDER — ACETAMINOPHEN 10 MG/ML
1000 INJECTION, SOLUTION INTRAVENOUS EVERY 8 HOURS
Status: DISPENSED | OUTPATIENT
Start: 2018-03-14 | End: 2018-03-15

## 2018-03-14 RX ORDER — ALBUMIN HUMAN 50 G/1000ML
25 SOLUTION INTRAVENOUS EVERY 6 HOURS
Status: DISPENSED | OUTPATIENT
Start: 2018-03-14 | End: 2018-03-15

## 2018-03-14 RX ADMIN — DRONABINOL 2.5 MG: 2.5 CAPSULE ORAL at 08:40

## 2018-03-14 RX ADMIN — INSULIN LISPRO 4 UNITS: 100 INJECTION, SOLUTION INTRAVENOUS; SUBCUTANEOUS at 23:53

## 2018-03-14 RX ADMIN — ONDANSETRON HYDROCHLORIDE 4 MG: 2 INJECTION INTRAMUSCULAR; INTRAVENOUS at 03:04

## 2018-03-14 RX ADMIN — ACETAMINOPHEN 1000 MG: 10 INJECTION, SOLUTION INTRAVENOUS at 22:35

## 2018-03-14 RX ADMIN — Medication: at 10:33

## 2018-03-14 RX ADMIN — ALBUTEROL SULFATE 2.5 MG: 2.5 SOLUTION RESPIRATORY (INHALATION) at 11:51

## 2018-03-14 RX ADMIN — PROCHLORPERAZINE EDISYLATE 5 MG: 5 INJECTION INTRAMUSCULAR; INTRAVENOUS at 04:46

## 2018-03-14 RX ADMIN — LORAZEPAM 1 MG: 2 INJECTION INTRAMUSCULAR; INTRAVENOUS at 15:28

## 2018-03-14 RX ADMIN — FAMOTIDINE: 10 INJECTION, SOLUTION INTRAVENOUS at 20:11

## 2018-03-14 RX ADMIN — ENOXAPARIN SODIUM 135 MG: 150 INJECTION SUBCUTANEOUS at 22:35

## 2018-03-14 RX ADMIN — ALBUMIN (HUMAN) 25 G: 12.5 INJECTION, SOLUTION INTRAVENOUS at 15:22

## 2018-03-14 RX ADMIN — ACETAMINOPHEN 1000 MG: 10 INJECTION, SOLUTION INTRAVENOUS at 15:20

## 2018-03-14 RX ADMIN — Medication: at 20:00

## 2018-03-14 RX ADMIN — PROCHLORPERAZINE EDISYLATE 5 MG: 5 INJECTION INTRAMUSCULAR; INTRAVENOUS at 13:12

## 2018-03-14 RX ADMIN — Medication 10 ML: at 10:39

## 2018-03-14 RX ADMIN — HYDROMORPHONE HYDROCHLORIDE 2 MG: 2 INJECTION INTRAMUSCULAR; INTRAVENOUS; SUBCUTANEOUS at 06:29

## 2018-03-14 RX ADMIN — Medication: at 02:23

## 2018-03-14 RX ADMIN — HYDROMORPHONE HYDROCHLORIDE 2 MG: 2 INJECTION INTRAMUSCULAR; INTRAVENOUS; SUBCUTANEOUS at 01:15

## 2018-03-14 RX ADMIN — NYSTATIN: 100000 CREAM TOPICAL at 19:31

## 2018-03-14 RX ADMIN — LORAZEPAM 1 MG: 2 INJECTION INTRAMUSCULAR; INTRAVENOUS at 01:19

## 2018-03-14 RX ADMIN — I.V. FAT EMULSION 50 ML/HR: 20 EMULSION INTRAVENOUS at 20:14

## 2018-03-14 RX ADMIN — LORAZEPAM 1 MG: 2 INJECTION INTRAMUSCULAR; INTRAVENOUS at 10:38

## 2018-03-14 RX ADMIN — Medication 10 ML: at 06:00

## 2018-03-14 RX ADMIN — ONDANSETRON HYDROCHLORIDE 4 MG: 2 INJECTION INTRAMUSCULAR; INTRAVENOUS at 08:44

## 2018-03-14 RX ADMIN — NYSTATIN: 100000 CREAM TOPICAL at 08:45

## 2018-03-14 NOTE — PROGRESS NOTES
Deferred visit: Checked on patient x 2 today and she has been unavailable and unable to participate due to multiple other medical needs. She has had a lot of activity so nursing ask that we defer.

## 2018-03-14 NOTE — ROUTINE PROCESS
Bedside shift change report given to Naomie Cervantes (oncoming nurse) by Consuelo Brand (offgoing nurse). Report included the following information SBAR, ED Summary, Intake/Output, Med Rec Status and Cardiac Rhythm Sinus tach.

## 2018-03-14 NOTE — PROGRESS NOTES
NUTRITION COMPLETE ASSESSMENT    RECOMMENDATIONS:   1. Consider resumption of nocturnal/cyclic TPN, which would also allow LR to run during the day    Note: pt has only been meeting 87% and 45% of estimated kcal and protein needs, respectively, is inadequate for post-op healing. **See recs below for possible options (if pt unable to increase volume above ~3953-2568 mL/day vs if pt able to tolerate increased volume ~8623-1501 mL/day)    2. Continue daily weights and rezero bedscale     Interventions/Plan:   Food/Nutrient Delivery: TPN remains sole option and source for nutrition support    Assessment:   Reason for Assessment:   [x]Reassessment    Diet:  NPO  Nutritionally Significant Medications: [x] Reviewed & Includes: marinol 2.5 mg BID; humalog correction scale; zofran q 4 hours prn; compazine q 6 hours prn; scopolamine patch; phenergan q6 hours prn     Previous TPN orders (currently held): 5%AA D20 @ 42 mL/hr + MVI, thiamine (100 mg), famotidine 40 mg, trace elements 3x/week, 20% lipids, 500 mL 3x/week    Meal Intake:   Patient Vitals for the past 100 hrs:   % Diet Eaten   03/13/18 1600 0 %   03/13/18 1200 0 %   03/13/18 0800 0 %     Subjective:  Pt dry heaving during visit attempt. Mom asking if RN can pull meds for pain (Palliative Care adjusting meds today). Objective:  Chart reviewed, discussed with team during interdisciplinary rounds. Also spoke with day shift RN and float RN who had the patient during events yesterday as well as MD from Palliative Care. Pt's TPN was held yesterday. Per report, this was held secondary to possible sepsis or initial fear of necrotic bowel (surgeon evaluated and removed hematoma from pt's abdomen), and to resume LR @ 125 mL/hr. The pt has a double lumen PICC, so unable to run LR and TPN. Also noted she was unable to complete UGI series yesterday secondary to nausea/vomiting and inability to get on a stretcher bed. KUB showed \"no evidence for bowel obstruction. Increased large amount of stool versus packing material in the mid lower abdomen. \"    TPN has been providing a daily average of 1314 kcal, 50 g protein in 1008 mL (1508 mL on lipid days)-- meeting 87% and 45% of estimated kcal and protein needs, respectively. Suspect she is both volume and nutritionally depleted at this time. Once plan in place for resumption of TPN, would recommend increasing volume or concentrating in a smaller volume if necessary. If unable to increase volume above ~1546-1924 mL, would recommend 6%AA D20 @ 70 mL/hr x 1 hour; 125 mL/hr x 11 hours; 70 mL/hr x last hour + 20% lipids, 500 mL 3x/week. Check BG 2 hours into infusion and 1 hour after infusion completed. This would provide a daily average of 1823 kcal, 91 g protein in 1512 mL (2015 mL on lipid days). 303 gm CHO. This would also allow LR to run during the day while TPN is off. If pt able to tolerate increase volume, would increase TPN to goal of 68 mL/hr x 1 hour; 128 mL/hr x 13 hours; 68 ml/hr x last hour + 20% lipids, 250 mL 3x/week. Check BG 2 hours into infusion and 1 hour after infusion is completed. This would provide a daily average of 2183 kcal, 100 g protein in 1992 mL (2492 mL on lipid days)-- meeting 100% and 91% of estimated kcal and protein needs, respectively. Noted pt started on Marinol on 3/8-- unclear if this was for appetite as she is not eating vs started for post-op nausea. Current weights are very difficult to assess for trends and accuracy. Pt with 16.2 kg weight \"gain\" since admission. Did not recalculate estimated needs as it's very unclear these are accurate trends. The pt is unable to stand or get out of the bed, so the bed has not been rezeroed.      Estimated Nutrition Needs:   Kcals/day: 1507 Kcals/day (1706-4867 kcal/day (11-15 kcal/kg))  Protein: 110 g (0.8 g/kg)  Fluid: 2000 ml (or per output)     Based On: Kcal/kg - specify (Comment)  Weight Used: Actual wt (137 kg (need updated wt))    Pt expected to meet estimated nutrient needs:  []   Yes     [x]  No    Nutrition Diagnosis:   1. 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 Less than optimal parenteral nutrition related to low volume/concentration TPN as evidenced by TPN meeting 87% and 45% of estimated kcal and protein needs, respectively (and TPN currently held/off)    Goals:     Pt to meet at least 90% of estimated needs via TPN over the next 5-7 days     Monitoring & Evaluation:    - Enteral/parenteral nutrition intake   - Weight/weight change      Previous Nutrition Goals Met:  No  Previous Recommendations:      No    Education & Discharge Needs:   [x] None Identified   [] Identified and addressed    [x] Participated in care plan, discharge planning, and/or interdisciplinary rounds        Cultural, Tenriism and ethnic food preferences identified:  NONE      Skin Integrity: []Intact  []Other  Edema: []None [x]Other: generalized anasarca, 2+ LLE, RLE  Last BM: 3/7/18 (ostomy)  Food Allergies: [x]None []Other    Anthropometrics:    Weight Loss Metrics 3/14/2018 3/7/2018 3/5/2018 2/28/2018 2/27/2018 2/27/2018 2/26/2018   Today's Wt 337 lb 12.8 oz - 304 lb 3.2 oz 306 lb 306 lb 8 oz - 307 lb   BMI - 57.98 kg/m2 52.22 kg/m2 52.52 kg/m2 52.61 kg/m2 - 52.7 kg/m2      Last 3 Recorded Weights in this Encounter    03/13/18 0300 03/13/18 0841 03/14/18 0305   Weight: 144.2 kg (317 lb 14.5 oz) 145.3 kg (320 lb 5.3 oz) 153.2 kg (337 lb 12.8 oz)      Weight Source: Bed  Height: 5' 4\" (162.6 cm),    Body mass index is 57.98 kg/(m^2).   IBW : 54.4 kg (120 lb),    Usual Body Weight: 135.2 kg (298 lb) (1/2018),      Labs:    Lab Results   Component Value Date/Time    Sodium 139 03/14/2018 02:35 AM    Potassium 4.9 03/14/2018 02:35 AM    Chloride 108 03/14/2018 02:35 AM    CO2 22 03/14/2018 02:35 AM    Glucose 175 (H) 03/14/2018 02:35 AM    BUN 19 03/14/2018 02:35 AM    Creatinine 0.93 03/14/2018 02:35 AM    Calcium 9.0 03/14/2018 02:35 AM    Magnesium 1.9 03/14/2018 02:35 AM    Phosphorus 3.8 03/14/2018 02:35 AM    Albumin 2.3 (L) 03/08/2018 03:11 AM     No results found for: HBA1C, HGBE8, NKT1ZWST, SQQ7PFLA, RZN0MNHZ  Lab Results   Component Value Date/Time    Glucose 175 (H) 03/14/2018 02:35 AM    Glucose (POC) 181 (H) 03/14/2018 11:40 AM      Lab Results   Component Value Date/Time    ALT (SGPT) 179 (H) 03/08/2018 03:11 AM    AST (SGOT) 33 03/08/2018 03:11 AM    Alk.  phosphatase 80 03/08/2018 03:11 AM    Bilirubin, direct 0.1 07/07/2009 06:38 PM    Bilirubin, total 2.6 (H) 03/08/2018 03:11 AM      Abril Esters, [address]

## 2018-03-14 NOTE — PROGRESS NOTES
Palliative Medicine Consult  [place]ath: [phone] ([phone])    Patient Name: Shannan Andrea  YOB: 1975    Date of Initial Consult: 3/12/18  Reason for Consult: Pain management, chronic and post op   Requesting Provider: Rajni   Primary Care Physician: Gela Stephens MD     SUMMARY:   Shannan Andrea is a 38 y.o. with a past history of Crohn's disease (followed by Dr Alvino Diallo), mult surgeries s/p total colectomy w/ end ileostomy, 6/2017 ex lap with small bowel perforation, 6/28/17 ex lap, LPA, repair or enterotomy, SMA stent on L, 7/16/2917 ex lap, KATHRINE, fistula exclusion w/ feeding tube placement and prolonged TPN who was admitted on 3/7/2018 from home for planned surgery, s/p ex lap w/ extensive KATHRINE and small bowel fistula take down, wound vac placement. Known to our team during past hospitalizations. Has had lengthy hospital stays w/ mult complications. Has required [facility] stays in past. Has chronic pain from Crohn's disease (and has not been able to tolerate home Crohn's meds recently due to acute issues- had been on steroids, stopped prior to surgery). Discussion of Remicaide in future. Current medical issues leading to Palliative Medicine involvement include: pain management. Patient's mother, Eduardo Marcano is NOK.  reviewed, no abberrancies- one prescriber for opioids and one pharmacy. Most recently filled 3/9/18: MSContin 100mg tid and Morphine IR 30mg- 8 tabs a day. Has been tried on equivalent dose of Fentanyl patch w/out same benefit. PALLIATIVE DIAGNOSES:   1. Acute post operative pain in abdomen  2. Chronic abdominal pain from Cronh's disease and hx of surgeries  3. Nausea  4. Anxiety   5. Feeding difficulties due to medical condition- has been on TPN for several months  6. Edema   7. L ankle pain s/p fall, normal XRs  8. Grief/depressive sx   9. Constipation      PLAN:   1. Continues to have N/V, unable to do upper GI yet.    2. Was concerned about her absorbing MSContin w/ vomiting, so started basal on PCA. Note that this basal is still less than her MS Contin dose. 3. Abd pain from surgical incision and wound care as well as underlying chronic pain from Crohn's which is worse when off her steroids. 4. Adjust Dilaudid PCA today. Basal 0.6mg/h and 0.5mg IV every 6 min demand. 5. Encourage pt to work w/ therapies, allow bed mobility which is key to her recovery and her ability to get home. 6. Once tolerating po better, will transition to po meds. 7. Following to assist w/ transition to po medication/home regimen as heals. 8. Bowel regimen, anti-emetics per surgery. 9. Anxiety significant. Will cont to have pastoral care see. 10. Following for support. 11. Communicated plan of care with: Palliative IDT; Michelle Tirado RN; Odalis Farnsworth RD       GOALS OF CARE / TREATMENT PREFERENCES:     GOALS OF CARE:  Patient/Health Care Proxy Stated Goals:  (Sx management )      TREATMENT PREFERENCES:   Code Status: Full Code    Advance Care Planning:  Advance Care Planning 3/8/2018   Patient's Healthcare Decision Maker is: Named in scanned ACP document   Primary Decision Maker Name Brigette Hernández   Primary Decision Maker Phone Number [phone]   Primary Decision Maker Relationship to Patient Parent   Confirm Advance Directive Yes, on file   Patient Would Like to Complete Advance Directive No   Does the patient have other document types Power of        Medical Interventions: Full interventions           Other:    As far as possible, the palliative care team has discussed with patient / health care proxy about goals of care / treatment preferences for patient. HISTORY:         HPI/SUBJECTIVE:    The patient is:   [x] Verbal and participatory  [] Non-participatory due to:     Cont to have green/bilious vomit, dry heaving and retching before vomiting. Anxiety significant. Tearful.      Meds for pain and sx incl:  MsContin 100mg tid   Scopolamine patch   Dilaudid PCA 0.3mg/h, 0.4mg IV every 6 min demand  Marinol   Dilaudid 2mg IV every 3h prn breakthrough from PCA- almost around the clock  Ativan 1mg IV every 6h prn    Clinical Pain Assessment (nonverbal scale for severity on nonverbal patients):   Clinical Pain Assessment  Severity: 9  Location: Abdomen   Character: Sharp and aching   Duration: chronic and since surgery   Effect: harder to move   Factors: worse w/ movement and wound care  Frequency: constant          Duration: for how long has pt been experiencing pain (e.g., 2 days, 1 month, years)  Frequency: how often pain is an issue (e.g., several times per day, once every few days, constant)     FUNCTIONAL ASSESSMENT:     Palliative Performance Scale (PPS):  PPS: 60       PSYCHOSOCIAL/SPIRITUAL SCREENING:     Palliative IDT has assessed this patient for cultural preferences / practices and a referral made as appropriate to needs (Cultural Services, Patient Advocacy, Ethics, etc.)    Advance Care Planning:  Advance Care Planning 3/8/2018   Patient's Healthcare Decision Maker is: Named in scanned ACP document   Primary Decision Maker Name Mabel Wu   Primary Decision Maker Phone Number [phone]   Primary Decision Maker Relationship to Patient Parent   Confirm Advance Directive Yes, on file   Patient Would Like to Complete Advance Directive No   Does the patient have other document types Power of        Any spiritual / Tenriism concerns:  [] Yes /  [x] No    Caregiver Burnout:  [] Yes /  [x] No /  [] No Caregiver Present      Anticipatory grief assessment:   [x] Normal  / [] Maladaptive       ESAS Anxiety: Anxiety: 6    ESAS Depression: Depression: 2        REVIEW OF SYSTEMS:     Positive and pertinent negative findings in ROS are noted above in HPI. The following systems were [x] reviewed / [] unable to be reviewed as noted in HPI  Other findings are noted below.   Systems: constitutional, ears/nose/mouth/throat, respiratory, gastrointestinal, genitourinary, musculoskeletal, integumentary, neurologic, psychiatric, endocrine. Positive findings noted below. Modified ESAS Completed by: provider   Fatigue: 3 Drowsiness: 0   Depression: 2 Pain: 9   Anxiety: 6 Nausea: 4   Anorexia: 8 Dyspnea: 0     Constipation: Yes              PHYSICAL EXAM:     From RN flowsheet:  Wt Readings from Last 3 Encounters:   03/14/18 337 lb 12.8 oz (153.2 kg)   03/05/18 304 lb 3.2 oz (138 kg)   02/28/18 306 lb (138.8 kg)     Blood pressure (!) 126/95, pulse (!) 130, temperature 98.4 °F (36.9 °C), resp. rate 18, height 5' 4\" (1.626 m), weight 337 lb 12.8 oz (153.2 kg), SpO2 100 %.     Pain Scale 1: Numeric (0 - 10)  Pain Intensity 1: 9  Pain Onset 1: post-op  Pain Location 1: Abdomen  Pain Orientation 1: Mid  Pain Description 1: Constant, Throbbing  Pain Intervention(s) 1: Medication (see MAR)  Last bowel movement, if known: stool output in ostomy     Constitutional: awake, alert, oriented, no sedation or confusion, diaphoretic and tearful   Eyes: pupils equal, anicteric  ENMT: no nasal discharge, moist mucous membranes  Cardiovascular: regular rhythm, tachycardic at baseline  Respiratory: breathing not labored, symmetric  Gastrointestinal: soft , TTP, hypoactive bowel sounds, ostomy, midline incisions c/d/i  Musculoskeletal: no deformity, TTP over L ankle  Skin: warm, dry, pale  Neurologic: following commands, moving all extremities  Psychiatric: full affect, no hallucinations         HISTORY:     Active Problems:    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)      Past Medical History:   Diagnosis Date    Adverse effect of anesthesia     WOKE DURING SURGERY    Arthritis     Blood clot in vein 2017    STOMACH    Crohn's disease ([address] 8/15/2011    DVT (deep venous thrombosis) ([address] 8/15/2011    LEFT LEG    Edema     GENERALIZED R/T TPN; WAIST DOWN    Incarcerated ventral hernia 8/15/2011    Lupus     Lyme disease     Psychiatric disorder     ANXIETY    Right flank pain 8/22/2011    Seizures ([address] 1990    LAST AT AGE 17    Stroke [facility]) 1990    age 17; A WEEK POST OP, HAD SEIZURES AND STROKE, WAS IN COMA FOR A MONTH    Thyroid disease     HYPO-NO MEDS      Past Surgical History:   Procedure Laterality Date    HX APPENDECTOMY      HX GYN  2015    HIDRADENTIS LABIA    HX OTHER SURGICAL      multiple procedures related to her Crohn's disease    HX OTHER SURGICAL      ABDOMINAL SURGERY REMOVING COLON, 2/3 STOMACH, 1/3 SMALL INTESTINE    CT LAP, INCISIONAL HERNIA REPAIR,INCARCERATED  9-10-08    dr. Marian Trejo      Family History   Problem Relation Age of Onset    Hypertension Father     Stroke Father     Other Father      arthritis    Arthritis-osteo Father     Hypertension Mother     Arthritis-osteo Mother     Anesth Problems Neg Hx       History reviewed, no pertinent family history.   Social History   Substance Use Topics    Smoking status: Former Smoker     Packs/day: 1.00     Years: 16.00     Quit date: 2/27/2017    Smokeless tobacco: Former User      Comment: OFF AND ON    Alcohol use No     Allergies   Allergen Reactions    Sulfa (Sulfonamide Antibiotics) Anaphylaxis    Demerol [Meperidine] Rash    Soma [Carisoprodol] Rash and Nausea Only    Toradol [Ketorolac Tromethamine] Nausea and Vomiting      Current Facility-Administered Medications   Medication Dose Route Frequency    TPN ADULT - CENTRAL   IntraVENous CONTINUOUS    nystatin (MYCOSTATIN) 100,000 unit/gram cream   Topical BID    prochlorperazine (COMPAZINE) with saline injection 5 mg  5 mg IntraVENous Q6H PRN    naloxone (NARCAN) injection 0.4 mg  0.4 mg IntraVENous EVERY 2 MINUTES AS NEEDED    HYDROmorphone (DILAUDID) injection 2 mg  2 mg IntraVENous Q3H PRN    0.9% sodium chloride infusion 250 mL  250 mL IntraVENous PRN    fat emulsion 20% (LIPOSYN, INTRAlipid) infusion  50 mL/hr IntraVENous Q MON, WED & FRI    glucose chewable tablet 16 g  4 Tab Oral PRN    dextrose (D50W) injection syrg 12.5-25 g  12.5-25 g IntraVENous PRN  glucagon (GLUCAGEN) injection 1 mg  1 mg IntraMUSCular PRN    insulin lispro (HUMALOG) injection   SubCUTAneous Q6H    enoxaparin (LOVENOX) injection 135 mg  135 mg SubCUTAneous Q12H    scopolamine (TRANSDERM-SCOP) 1 mg over 3 days 1 Patch  1 Patch TransDERmal Q72H    sodium chloride (NS) flush 20 mL  20 mL InterCATHeter PRN    sodium chloride (NS) flush 10 mL  10 mL InterCATHeter Q24H    sodium chloride (NS) flush 10 mL  10 mL InterCATHeter PRN    sodium chloride (NS) flush 10 mL  10 mL InterCATHeter Q8H    alteplase (CATHFLO) 1 mg in sterile water (preservative free) 1 mL injection  1 mg InterCATHeter PRN    bacitracin 500 unit/gram packet 1 Packet  1 Packet Topical PRN    sodium chloride (NS) flush 20 mL  20 mL InterCATHeter PRN    sodium chloride (NS) flush 10 mL  10 mL InterCATHeter Q24H    sodium chloride (NS) flush 10 mL  10 mL InterCATHeter PRN    sodium chloride (NS) flush 10 mL  10 mL InterCATHeter Q8H    dronabinol (MARINOL) capsule 2.5 mg  2.5 mg Oral BID    lactated Ringers infusion  125 mL/hr IntraVENous CONTINUOUS    HYDROmorphone (PF) 15 mg/30 ml (DILAUDID) PCA   IntraVENous CONTINUOUS    phenol throat spray (CHLORASEPTIC) 1 Spray  1 Spray Oral PRN    ondansetron (ZOFRAN) injection 4 mg  4 mg IntraVENous Q4H PRN    promethazine (PHENERGAN) 12.5 mg in 0.9% sodium chloride 50 mL IVPB  12.5 mg IntraVENous Q6H PRN    LORazepam (ATIVAN) injection 1 mg  1 mg IntraVENous Q6H PRN    albuterol (PROVENTIL VENTOLIN) nebulizer solution 2.5 mg  2.5 mg Nebulization Q4H PRN          LAB AND IMAGING FINDINGS:     Lab Results   Component Value Date/Time    WBC 9.3 03/14/2018 02:35 AM    HGB 10.2 (L) 03/14/2018 02:35 AM    PLATELET 132 (L) 65/68/5539 02:35 AM     Lab Results   Component Value Date/Time    Sodium 139 03/14/2018 02:35 AM    Potassium 4.9 03/14/2018 02:35 AM    Chloride 108 03/14/2018 02:35 AM    CO2 22 03/14/2018 02:35 AM    BUN 19 03/14/2018 02:35 AM    Creatinine 0.93 03/14/2018 02:35 AM    Calcium 9.0 03/14/2018 02:35 AM    Magnesium 1.9 03/14/2018 02:35 AM    Phosphorus 3.8 03/14/2018 02:35 AM      Lab Results   Component Value Date/Time    AST (SGOT) 33 03/08/2018 03:11 AM    Alk. phosphatase 80 03/08/2018 03:11 AM    Protein, total 5.1 (L) 03/08/2018 03:11 AM    Albumin 2.3 (L) 03/08/2018 03:11 AM    Globulin 2.8 03/08/2018 03:11 AM     Lab Results   Component Value Date/Time    INR 1.0 12/07/2017 08:37 AM    Prothrombin time 10.0 12/07/2017 08:37 AM      No results found for: IRON, FE, TIBC, IBCT, PSAT, FERR   No results found for: PH, PCO2, PO2  No components found for: GLPOC   No results found for: CPK, CKMB             Total time:   Counseling / coordination time, spent as noted above:   > 50% counseling / coordination?:     Prolonged service was provided for  []30 min   []75 min in face to face time in the presence of the patient, spent as noted above. Time Start:   Time End:   Note: this can only be billed with 37548 (initial) or 65651 (follow up). If multiple start / stop times, list each separately.

## 2018-03-14 NOTE — DIABETES MGMT
DTC Progress Note    Recommendations/ Comments:  Hyperglycemia. Noted pt on TPN and will be increased to 63 ml/hr continuous (previously 42 ml/hr)  She will receive 300 grams of dextrose from new TPN bag     If appropriate, please consider:  - Adding insulin to TPN, consider starting with 10 units/L (total dose of 15 units)    Current hospital DM medication: Humalog for correction, insulin resistant scale     Chart reviewed on [address] [address] . Patient is a 38 y.o. female with no hx of diabetes on correction insulin at home for TPN    A1c:   No results found for: HBA1C, HGBE8, PHJ2GRWI    Recent Glucose Results: Lab Results   Component Value Date/Time     (H) 03/14/2018 02:35 AM    GLUCPOC 181 (H) 03/14/2018 11:40 AM    GLUCPOC 155 (H) 03/14/2018 06:07 AM    GLUCPOC 139 (H) 03/13/2018 07:18 PM        Lab Results   Component Value Date/Time    Creatinine 0.93 03/14/2018 02:35 AM     Estimated Creatinine Clearance: 119.5 mL/min (based on Cr of 0.93). Active Orders   Diet    DIET NPO With Ice Chips        PO intake: Patient Vitals for the past 72 hrs:   % Diet Eaten   03/13/18 1600 0 %   03/13/18 1200 0 %   03/13/18 0800 0 %       Will continue to follow as needed.     Thank you  Pooja Lee RD  [facility]  Pager: [phone]

## 2018-03-14 NOTE — PROGRESS NOTES
Progress Note    Patient: Kristy Weeks MRN: [id number]  SSN: [id number]    YOB: 1975  Age: 38 y.o. Sex: female      Admit Date: 3/7/2018    7 Days Post-Op    Procedure:  Procedure(s):  EXPLORATORY LAPAROTOMY, LYSIS OF ADHESION X 5HOURS, SMALL BOWEL FISTULA TAKE DOWN X 2 AND WOUND VAC PLACEMENT    Subjective:     No acute surgical issues. Pt with significant nausea and has had vomiting. KUB showed no evidence of obstruction. OStomy is minimally productive. Objective:     Visit Vitals    BP 96/66 (BP 1 Location: Left arm, BP Patient Position: At rest)    Pulse (!) 142    Temp 98.2 °F (36.8 °C)    Resp 20    Ht 5' 4\" (1.626 m)    Wt 337 lb 12.8 oz (153.2 kg)    SpO2 96%    BMI 57.98 kg/m2       Temp (24hrs), Av.5 °F (36.9 °C), Min:98.2 °F (36.8 °C), Max:98.9 °F (37.2 °C)        Physical Exam:    Gen:  NAD  Pulm:  Unlabored  Abd:  S/ND/appropriate TTP  Wound:  Large open midline abdominal wound with minimal fibrinous exudate at 3 and 11 oclock.  No active bleeding    Recent Results (from the past 24 hour(s))   GLUCOSE, POC    Collection Time: 18  2:22 PM   Result Value Ref Range    Glucose (POC) 161 (H) 65 - 100 mg/dL    Performed by Diaz Carreno    CBC W/O DIFF    Collection Time: 18  3:22 PM   Result Value Ref Range    WBC 10.1 3.6 - 11.0 K/uL    RBC 2.36 (L) 3.80 - 5.20 M/uL    HGB 7.8 (L) 11.5 - 16.0 g/dL    HCT 23.5 (L) 35.0 - 47.0 %    MCV 99.6 (H) 80.0 - 99.0 FL    MCH 33.1 26.0 - 34.0 PG    MCHC 33.2 30.0 - 36.5 g/dL    RDW 21.1 (H) 11.5 - 14.5 %    PLATELET 635 (L) 077 - 400 K/uL    MPV 10.8 8.9 - 12.9 FL    NRBC 4.3 (H) 0  WBC    ABSOLUTE NRBC 0.43 (H) 0.00 - 0.01 K/uL   GLUCOSE, POC    Collection Time: 18  7:18 PM   Result Value Ref Range    Glucose (POC) 139 (H) 65 - 100 mg/dL    Performed by Adonis Wall, BASIC    Collection Time: 18  2:35 AM   Result Value Ref Range    Sodium 139 136 - 145 mmol/L    Potassium 4.9 3.5 - 5.1 mmol/L    Chloride 108 97 - 108 mmol/L    CO2 22 21 - 32 mmol/L    Anion gap 9 5 - 15 mmol/L    Glucose 175 (H) 65 - 100 mg/dL    BUN 19 6 - 20 MG/DL    Creatinine 0.93 0.55 - 1.02 MG/DL    BUN/Creatinine ratio 20 12 - 20      GFR est AA >60 >60 ml/min/1.73m2    GFR est non-AA >60 >60 ml/min/1.73m2    Calcium 9.0 8.5 - 10.1 MG/DL   MAGNESIUM    Collection Time: 03/14/18  2:35 AM   Result Value Ref Range    Magnesium 1.9 1.6 - 2.4 mg/dL   PHOSPHORUS    Collection Time: 03/14/18  2:35 AM   Result Value Ref Range    Phosphorus 3.8 2.6 - 4.7 MG/DL   CBC W/O DIFF    Collection Time: 03/14/18  2:35 AM   Result Value Ref Range    WBC 9.3 3.6 - 11.0 K/uL    RBC 3.21 (L) 3.80 - 5.20 M/uL    HGB 10.2 (L) 11.5 - 16.0 g/dL    HCT 32.0 (L) 35.0 - 47.0 %    MCV 99.7 (H) 80.0 - 99.0 FL    MCH 31.8 26.0 - 34.0 PG    MCHC 31.9 30.0 - 36.5 g/dL    RDW 21.1 (H) 11.5 - 14.5 %    PLATELET 228 (L) 738 - 400 K/uL    MPV 10.7 8.9 - 12.9 FL    NRBC 7.9 (H) 0  WBC    ABSOLUTE NRBC 0.73 (H) 0.00 - 0.01 K/uL   GLUCOSE, POC    Collection Time: 03/14/18  6:07 AM   Result Value Ref Range    Glucose (POC) 155 (H) 65 - 100 mg/dL    Performed by [id number] [employer] [place], POC    Collection Time: 03/14/18 11:40 AM   Result Value Ref Range    Glucose (POC) 181 (H) 65 - 100 mg/dL    Performed by Karrie Fuller            Assessment:     Hospital Problems  Date Reviewed: 10/27/2017          Codes Class Noted POA    Small bowel fistula ICD-10-CM: K63.2  ICD-9-CM: 569.81  3/7/2018 Unknown              Plan/Recommendations/Medical Decision Making:     - Renew TPN  - Continue wound care.   Wound vac tomorrow  - Continue PCA pain medication  - Labs in am  - Continue lovenox    Signed By: Rosalinda Haley MD     March 14, 2018

## 2018-03-14 NOTE — PROGRESS NOTES
19:30  Pt reports continuing nausea and request that I keep a tight antiemetic schedule. Will alternate Ativan, Zofran,  Compazine, and Phenergan to keep nausea under control     02:00 Patient vomited roughly 3mL dark green emesis into cup - meds given. 04:00 patient vomited 110 mL of dark green emesis - compazine given. Patient wants to hold of on NGT placement as long as possible. 06:00 nausea under control with tight antiemetic schedule.  Patient resting quietly

## 2018-03-15 LAB
ANION GAP SERPL CALC-SCNC: 9 MMOL/L (ref 5–15)
BUN SERPL-MCNC: 24 MG/DL (ref 6–20)
BUN/CREAT SERPL: 22 (ref 12–20)
CALCIUM SERPL-MCNC: 8.1 MG/DL (ref 8.5–10.1)
CHLORIDE SERPL-SCNC: 107 MMOL/L (ref 97–108)
CO2 SERPL-SCNC: 24 MMOL/L (ref 21–32)
CREAT SERPL-MCNC: 1.11 MG/DL (ref 0.55–1.02)
ERYTHROCYTE [DISTWIDTH] IN BLOOD BY AUTOMATED COUNT: 20.9 % (ref 11.5–14.5)
GLUCOSE BLD STRIP.AUTO-MCNC: 136 MG/DL (ref 65–100)
GLUCOSE BLD STRIP.AUTO-MCNC: 176 MG/DL (ref 65–100)
GLUCOSE BLD STRIP.AUTO-MCNC: 205 MG/DL (ref 65–100)
GLUCOSE SERPL-MCNC: 212 MG/DL (ref 65–100)
HCT VFR BLD AUTO: 16.8 % (ref 35–47)
HCT VFR BLD AUTO: 16.9 % (ref 35–47)
HGB BLD-MCNC: 5.3 G/DL (ref 11.5–16)
HGB BLD-MCNC: 5.4 G/DL (ref 11.5–16)
MCH RBC QN AUTO: 31.9 PG (ref 26–34)
MCHC RBC AUTO-ENTMCNC: 31.4 G/DL (ref 30–36.5)
MCV RBC AUTO: 101.8 FL (ref 80–99)
NRBC # BLD: 0.41 K/UL (ref 0–0.01)
NRBC BLD-RTO: 4.2 PER 100 WBC
PLATELET # BLD AUTO: 106 K/UL (ref 150–400)
PMV BLD AUTO: 11.1 FL (ref 8.9–12.9)
POTASSIUM SERPL-SCNC: 4.2 MMOL/L (ref 3.5–5.1)
RBC # BLD AUTO: 1.66 M/UL (ref 3.8–5.2)
SERVICE CMNT-IMP: ABNORMAL
SODIUM SERPL-SCNC: 140 MMOL/L (ref 136–145)
WBC # BLD AUTO: 9.8 K/UL (ref 3.6–11)

## 2018-03-15 PROCEDURE — P9016 RBC LEUKOCYTES REDUCED: HCPCS | Performed by: SURGERY

## 2018-03-15 PROCEDURE — 74011250636 HC RX REV CODE- 250/636: Performed by: SURGERY

## 2018-03-15 PROCEDURE — 36415 COLL VENOUS BLD VENIPUNCTURE: CPT | Performed by: SURGERY

## 2018-03-15 PROCEDURE — 3331090002 HH PPS REVENUE DEBIT

## 2018-03-15 PROCEDURE — 74011636637 HC RX REV CODE- 636/637: Performed by: SURGERY

## 2018-03-15 PROCEDURE — 85018 HEMOGLOBIN: CPT | Performed by: PHYSICIAN ASSISTANT

## 2018-03-15 PROCEDURE — 77030019952 HC CANSTR VAC ASST KCON -B

## 2018-03-15 PROCEDURE — 82962 GLUCOSE BLOOD TEST: CPT

## 2018-03-15 PROCEDURE — 85027 COMPLETE CBC AUTOMATED: CPT | Performed by: SURGERY

## 2018-03-15 PROCEDURE — 36430 TRANSFUSION BLD/BLD COMPNT: CPT

## 2018-03-15 PROCEDURE — 86920 COMPATIBILITY TEST SPIN: CPT | Performed by: SURGERY

## 2018-03-15 PROCEDURE — 74011250636 HC RX REV CODE- 250/636: Performed by: PHYSICAL MEDICINE & REHABILITATION

## 2018-03-15 PROCEDURE — 80048 BASIC METABOLIC PNL TOTAL CA: CPT | Performed by: SURGERY

## 2018-03-15 PROCEDURE — 74011000250 HC RX REV CODE- 250: Performed by: SURGERY

## 2018-03-15 PROCEDURE — 86900 BLOOD TYPING SEROLOGIC ABO: CPT | Performed by: SURGERY

## 2018-03-15 PROCEDURE — 86902 BLOOD TYPE ANTIGEN DONOR EA: CPT | Performed by: SURGERY

## 2018-03-15 PROCEDURE — 65660000000 HC RM CCU STEPDOWN

## 2018-03-15 PROCEDURE — 77030018717 HC DRSG GRNUFM KCON -B

## 2018-03-15 PROCEDURE — 86921 COMPATIBILITY TEST INCUBATE: CPT | Performed by: SURGERY

## 2018-03-15 PROCEDURE — 3331090001 HH PPS REVENUE CREDIT

## 2018-03-15 PROCEDURE — 74011000258 HC RX REV CODE- 258: Performed by: SURGERY

## 2018-03-15 PROCEDURE — 86922 COMPATIBILITY TEST ANTIGLOB: CPT | Performed by: SURGERY

## 2018-03-15 PROCEDURE — 77030018836 HC SOL IRR NACL ICUM -A

## 2018-03-15 RX ORDER — SODIUM CHLORIDE 9 MG/ML
250 INJECTION, SOLUTION INTRAVENOUS AS NEEDED
Status: DISCONTINUED | OUTPATIENT
Start: 2018-03-15 | End: 2018-04-02

## 2018-03-15 RX ADMIN — FAMOTIDINE: 10 INJECTION, SOLUTION INTRAVENOUS at 20:18

## 2018-03-15 RX ADMIN — ACETAMINOPHEN 1000 MG: 10 INJECTION, SOLUTION INTRAVENOUS at 06:15

## 2018-03-15 RX ADMIN — Medication 10 ML: at 10:21

## 2018-03-15 RX ADMIN — NYSTATIN: 100000 CREAM TOPICAL at 10:20

## 2018-03-15 RX ADMIN — Medication 10 ML: at 16:31

## 2018-03-15 RX ADMIN — INSULIN LISPRO 4 UNITS: 100 INJECTION, SOLUTION INTRAVENOUS; SUBCUTANEOUS at 13:49

## 2018-03-15 RX ADMIN — Medication: at 12:38

## 2018-03-15 RX ADMIN — NYSTATIN: 100000 CREAM TOPICAL at 18:51

## 2018-03-15 RX ADMIN — INSULIN LISPRO 4 UNITS: 100 INJECTION, SOLUTION INTRAVENOUS; SUBCUTANEOUS at 18:50

## 2018-03-15 RX ADMIN — DAKIN'S SOLUTION 0.125% (QUARTER STRENGTH): 0.12 SOLUTION at 14:57

## 2018-03-15 RX ADMIN — Medication: at 20:14

## 2018-03-15 NOTE — PROGRESS NOTES
Patient discussed during IDR this am. Patient remains on TPN and isolation for MRSA, Wound Vac now placed, has a PICC Line and being followed by Palliative Care for pain management. Patient is opened to [facility] for home TPN and [facility] for SNV. Patient will need an order to resume home care. CM will continue to follow for any other discharge needs.  Lainey Rahman MSA, RN, CRM

## 2018-03-15 NOTE — PROGRESS NOTES
Dr. Fabienne James was paged about patient not having enough access for all of the medication ordered at this time, just not enough ports. He stated to hold albumin at this time until TPN is completed.

## 2018-03-15 NOTE — PROGRESS NOTES
Progress Note    Patient: Lorna Hernandez MRN: [id number]  SSN: [id number]    YOB: 1975  Age: 38 y.o. Sex: female      Admit Date: 3/7/2018    8 Days Post-Op    Procedure:  Procedure(s):  EXPLORATORY LAPAROTOMY, LYSIS OF ADHESION X 5HOURS, SMALL BOWEL FISTULA TAKE DOWN X 2 AND WOUND VAC PLACEMENT    Subjective:     No acute surgical issues. Pt noted to have acute anemia most likely from oozing intra-abdominally. Noted some enteric content in dressing concerning for recurrent fistula. Pt is hemodynamically stable. OStomy is minimally productive. Objective:     Visit Vitals    /46 (BP 1 Location: Left arm, BP Patient Position: Supine)    Pulse (!) 112    Temp 98.2 °F (36.8 °C)    Resp 16    Ht 5' 4\" (1.626 m)    Wt 336 lb 13.8 oz (152.8 kg)    SpO2 100%    BMI 57.82 kg/m2       Temp (24hrs), Av.2 °F (36.8 °C), Min:97.9 °F (36.6 °C), Max:98.3 °F (36.8 °C)        Physical Exam:    Gen:  NAD  Pulm:  Unlabored  Abd:  S/ND/appropriate TTP  Wound:  Large open midline abdominal wound with minimal fibrinous exudate at 3 and 11 oclock. Enteric content welling up from suture knot area.     Recent Results (from the past 24 hour(s))   GLUCOSE, POC    Collection Time: 18  5:56 PM   Result Value Ref Range    Glucose (POC) 151 (H) 65 - 100 mg/dL    Performed by Camryn Perez, POC    Collection Time: 18 11:04 PM   Result Value Ref Range    Glucose (POC) 219 (H) 65 - 100 mg/dL    Performed by Raimundo Garcia    CBC W/O DIFF    Collection Time: 03/15/18  6:08 AM   Result Value Ref Range    WBC 9.8 3.6 - 11.0 K/uL    RBC 1.66 (L) 3.80 - 5.20 M/uL    HGB 5.3 (LL) 11.5 - 16.0 g/dL    HCT 16.9 (LL) 35.0 - 47.0 %    .8 (H) 80.0 - 99.0 FL    MCH 31.9 26.0 - 34.0 PG    MCHC 31.4 30.0 - 36.5 g/dL    RDW 20.9 (H) 11.5 - 14.5 %    PLATELET 397 (L) 968 - 400 K/uL    MPV 11.1 8.9 - 12.9 FL    NRBC 4.2 (H) 0  WBC    ABSOLUTE NRBC 0.41 (H) 0.00 - 3.39 K/uL   METABOLIC PANEL, BASIC    Collection Time: 03/15/18  6:08 AM   Result Value Ref Range    Sodium 140 136 - 145 mmol/L    Potassium 4.2 3.5 - 5.1 mmol/L    Chloride 107 97 - 108 mmol/L    CO2 24 21 - 32 mmol/L    Anion gap 9 5 - 15 mmol/L    Glucose 212 (H) 65 - 100 mg/dL    BUN 24 (H) 6 - 20 MG/DL    Creatinine 1.11 (H) 0.55 - 1.02 MG/DL    BUN/Creatinine ratio 22 (H) 12 - 20      GFR est AA >60 >60 ml/min/1.73m2    GFR est non-AA 54 (L) >60 ml/min/1.73m2    Calcium 8.1 (L) 8.5 - 10.1 MG/DL   HGB & HCT    Collection Time: 03/15/18  8:01 AM   Result Value Ref Range    HGB 5.4 (LL) 11.5 - 16.0 g/dL    HCT 16.8 (LL) 35.0 - 47.0 %   TYPE + CROSSMATCH    Collection Time: 03/15/18 10:58 AM   Result Value Ref Range    Crossmatch Expiration 03/18/2018     ABO/Rh(D) B POSITIVE     Antibody screen NEG     Comment PREVIOUSLY IDENTIFIED ANTI JKA     Unit number [id number]     Blood component type RC LR     Unit division 00     Status of unit ISSUED     ANTIGEN/ANTIBODY INFO Jk(A) NEGATIVE,     Crossmatch result Compatible    GLUCOSE, POC    Collection Time: 03/15/18 11:04 AM   Result Value Ref Range    Glucose (POC) 205 (H) 65 - 100 mg/dL    Performed by Michelle Montiel            Assessment:     Hospital Problems  Date Reviewed: 10/27/2017          Codes Class Noted POA    Small bowel fistula ICD-10-CM: K63.2  ICD-9-CM: 569.81  3/7/2018 Unknown              Plan/Recommendations/Medical Decision Making:     - Renew TPN  - Continue wound care. Monitor fistula output  - Continue NG tube to suction  - Continue PCA pain medication  - Labs in am  - Acute anemia from Enoxaparin therapy:  Hold lovenox. Transfuse 2 units PRBC  - Labs in am    Signed By: Jamaica Robertson MD     March 15, 2018       ADDENDUM FOR CODING QUERY:  The patient was noted to have acute bleeding from wound bed secondary to blood thinner which was held.   This included 3/15 Hbg 5.4; 4/7 6.7  \"Acute anemia:  This was secondar y to bleeding from wound bed from wound vac therapy and patient being on Plavix and Lovenox.   Plavix and Lovenox were held and the patient was transfused

## 2018-03-15 NOTE — PROGRESS NOTES
Bedside shift change report given to nichole (oncoming nurse) by roebrt (offgoing nurse). Report included the following information SBAR, Kardex, Intake/Output, Recent Results and Cardiac Rhythm sinus tach.

## 2018-03-15 NOTE — WOUND CARE
WOCN Note:     Follow-up visit for abdominal wound    Chart shows:  Admitted for fistula takdown 3/7/18 by Dr. Jacqueline Chen with [facility] placement in [address] [place] [address]; history of multiple abdominal surgeries and Crohns disease. Admitted from home. Seen today with АНДРЕЙ Diane, and Dr. Jacqueline Chen.   Enmanuel Rangel  Assessment:   Patient is A&O x 4 and mobile but debilitated s/p abdominal surgery. Bed: total care bariatric   Pure Wick in use. Using PCA for pain. NG to wall suction. Receiving blood currently; Hgb = 5.4 from 10.2 yesterday.       1. Midline abdominal surgical wound = 23.5 x 14 x 6 cm. 95% pink moist wound base with 5% scattered remnants of surgicel/clots. No malodor noted today. No active bleeding noted but small accumulation of green noted in base of wound. Then a moderate amount of bilious green out with alternating dark blood. Amount and quality appreciated by Dr. Jacqueline Chen at bedside. Decision made with Dr. Jacqueline Chen to connect to wall suction to manage output. Mepitel One placed in lowest area of wound bed and red rubber catheter rested on top after extra holes cut into catheter. 2 rolls of moist gauze packing placed around and red rubber secured to abdomen with tape and connected to wall suction @ 40. ABD toppers and secured with tape. Linear sheet wrinkles noted on back. Heels, sacrum, and buttocks intact and without erythema. Heels offloaded with pillows. Ostomy: stoma is moist & pink; good seal; mushy brown output. Wound Recommendations:    Leave packing in place and change ABD toppers as needed with drainage. Skin Care & Pressure Relief Recommendations:  Minimize layers of linen/pads under patient to optimize support surface. Turn/reposition approximately every 2 hours and offload heels. Manage incontinence / promote continence; Aloe Vesta to buttocks and sacrum daily     Discussed above plan with patient & RN.     Transition of Care: Plan to follow as needed while admitted to hospital.    YOLANDA DuarteN, RN, [facility]  Certified Wound, Ostomy, Continence Nurse  office [phone]  pager [phone] or call  to page

## 2018-03-15 NOTE — ACP (ADVANCE CARE PLANNING)
ADVANCED CARE PLANNING    Pt w/ AMD on file from 2017. Healthcare agents completed, living will section left blank. Confirmed with patient.        GOALS OF CARE:  Patient/Health Care Proxy Stated Goals:  (Sx management )    TREATMENT PREFERENCES:   Code Status: Full Code    Advance Care Planning:  Advance Care Planning 3/15/2018   Patient's Healthcare Decision Maker is: Named in scanned ACP document   Primary Decision Maker Name Piedad Limb   Primary Decision Maker Phone Number [phone]   Primary Decision Maker Relationship to Patient Parent   Secondary Decision Maker Name Telly Nath Phone Number [phone]   Secondary Decision Maker Relationship to Patient Unknown   Confirm Advance Directive Yes, on file   Patient Would Like to Complete Advance Directive -   Does the patient have other document types -       Medical Interventions: Full interventions

## 2018-03-16 LAB
ABO + RH BLD: NORMAL
ANION GAP SERPL CALC-SCNC: 8 MMOL/L (ref 5–15)
ANTIGENS PRESENT RBC DONR: NORMAL
BASOPHILS # BLD: 0.1 K/UL (ref 0–0.1)
BASOPHILS NFR BLD: 1 % (ref 0–1)
BLD PROD TYP BPU: NORMAL
BLOOD BANK CMNT PATIENT-IMP: NORMAL
BLOOD GROUP ANTIBODIES SERPL: NORMAL
BPU ID: NORMAL
BUN SERPL-MCNC: 21 MG/DL (ref 6–20)
BUN/CREAT SERPL: 22 (ref 12–20)
CALCIUM SERPL-MCNC: 8.1 MG/DL (ref 8.5–10.1)
CHLORIDE SERPL-SCNC: 107 MMOL/L (ref 97–108)
CO2 SERPL-SCNC: 24 MMOL/L (ref 21–32)
CREAT SERPL-MCNC: 0.97 MG/DL (ref 0.55–1.02)
CROSSMATCH RESULT,%XM: NORMAL
DIFFERENTIAL METHOD BLD: ABNORMAL
EOSINOPHIL # BLD: 0 K/UL (ref 0–0.4)
EOSINOPHIL NFR BLD: 0 % (ref 0–7)
ERYTHROCYTE [DISTWIDTH] IN BLOOD BY AUTOMATED COUNT: 20.2 % (ref 11.5–14.5)
GLUCOSE BLD STRIP.AUTO-MCNC: 178 MG/DL (ref 65–100)
GLUCOSE BLD STRIP.AUTO-MCNC: 186 MG/DL (ref 65–100)
GLUCOSE BLD STRIP.AUTO-MCNC: 191 MG/DL (ref 65–100)
GLUCOSE BLD STRIP.AUTO-MCNC: 193 MG/DL (ref 65–100)
GLUCOSE SERPL-MCNC: 183 MG/DL (ref 65–100)
HCT VFR BLD AUTO: 26.9 % (ref 35–47)
HGB BLD-MCNC: 8.9 G/DL (ref 11.5–16)
IMM GRANULOCYTES # BLD: 0 K/UL
IMM GRANULOCYTES NFR BLD AUTO: 0 %
LYMPHOCYTES # BLD: 2.8 K/UL (ref 0.8–3.5)
LYMPHOCYTES NFR BLD: 28 % (ref 12–49)
MCH RBC QN AUTO: 30.8 PG (ref 26–34)
MCHC RBC AUTO-ENTMCNC: 33.1 G/DL (ref 30–36.5)
MCV RBC AUTO: 93.1 FL (ref 80–99)
METAMYELOCYTES NFR BLD MANUAL: 7 %
MONOCYTES # BLD: 0.4 K/UL (ref 0–1)
MONOCYTES NFR BLD: 4 % (ref 5–13)
MYELOCYTES NFR BLD MANUAL: 1 %
NEUTS BAND NFR BLD MANUAL: 3 % (ref 0–6)
NEUTS SEG # BLD: 5.8 K/UL (ref 1.8–8)
NEUTS SEG NFR BLD: 56 % (ref 32–75)
NRBC # BLD: 0.34 K/UL (ref 0–0.01)
NRBC BLD-RTO: 3.4 PER 100 WBC
PLATELET # BLD AUTO: 88 K/UL (ref 150–400)
PMV BLD AUTO: 10.6 FL (ref 8.9–12.9)
POTASSIUM SERPL-SCNC: 4.3 MMOL/L (ref 3.5–5.1)
RBC # BLD AUTO: 2.89 M/UL (ref 3.8–5.2)
RBC MORPH BLD: ABNORMAL
SERVICE CMNT-IMP: ABNORMAL
SODIUM SERPL-SCNC: 139 MMOL/L (ref 136–145)
SPECIMEN EXP DATE BLD: NORMAL
STATUS OF UNIT,%ST: NORMAL
UNIT DIVISION, %UDIV: 0
WBC # BLD AUTO: 9.9 K/UL (ref 3.6–11)
WBC MORPH BLD: ABNORMAL

## 2018-03-16 PROCEDURE — 85025 COMPLETE CBC W/AUTO DIFF WBC: CPT | Performed by: SURGERY

## 2018-03-16 PROCEDURE — 97530 THERAPEUTIC ACTIVITIES: CPT

## 2018-03-16 PROCEDURE — 77030018836 HC SOL IRR NACL ICUM -A

## 2018-03-16 PROCEDURE — 82962 GLUCOSE BLOOD TEST: CPT

## 2018-03-16 PROCEDURE — 74011000258 HC RX REV CODE- 258: Performed by: SURGERY

## 2018-03-16 PROCEDURE — 3331090002 HH PPS REVENUE DEBIT

## 2018-03-16 PROCEDURE — 3331090001 HH PPS REVENUE CREDIT

## 2018-03-16 PROCEDURE — 74011636637 HC RX REV CODE- 636/637: Performed by: SURGERY

## 2018-03-16 PROCEDURE — 74011250636 HC RX REV CODE- 250/636: Performed by: PHYSICAL MEDICINE & REHABILITATION

## 2018-03-16 PROCEDURE — 74011250637 HC RX REV CODE- 250/637: Performed by: SURGERY

## 2018-03-16 PROCEDURE — 97110 THERAPEUTIC EXERCISES: CPT

## 2018-03-16 PROCEDURE — 74011000250 HC RX REV CODE- 250: Performed by: SURGERY

## 2018-03-16 PROCEDURE — 80048 BASIC METABOLIC PNL TOTAL CA: CPT | Performed by: SURGERY

## 2018-03-16 PROCEDURE — 74011250636 HC RX REV CODE- 250/636: Performed by: SURGERY

## 2018-03-16 PROCEDURE — 65660000000 HC RM CCU STEPDOWN

## 2018-03-16 PROCEDURE — 74011250636 HC RX REV CODE- 250/636: Performed by: EMERGENCY MEDICINE

## 2018-03-16 PROCEDURE — 36415 COLL VENOUS BLD VENIPUNCTURE: CPT | Performed by: SURGERY

## 2018-03-16 RX ORDER — HYDROMORPHONE HYDROCHLORIDE 2 MG/ML
1.5 INJECTION, SOLUTION INTRAMUSCULAR; INTRAVENOUS; SUBCUTANEOUS
Status: DISCONTINUED | OUTPATIENT
Start: 2018-03-16 | End: 2018-03-22

## 2018-03-16 RX ORDER — ACETAMINOPHEN 10 MG/ML
1000 INJECTION, SOLUTION INTRAVENOUS
Status: DISPENSED | OUTPATIENT
Start: 2018-03-16 | End: 2018-03-17

## 2018-03-16 RX ORDER — OCTREOTIDE ACETATE 50 UG/ML
50 INJECTION, SOLUTION INTRAVENOUS; SUBCUTANEOUS 3 TIMES DAILY
Status: DISCONTINUED | OUTPATIENT
Start: 2018-03-16 | End: 2018-04-02

## 2018-03-16 RX ADMIN — Medication 10 ML: at 16:03

## 2018-03-16 RX ADMIN — PROCHLORPERAZINE EDISYLATE 5 MG: 5 INJECTION INTRAMUSCULAR; INTRAVENOUS at 17:50

## 2018-03-16 RX ADMIN — OCTREOTIDE ACETATE 50 MCG: 50 INJECTION, SOLUTION INTRAVENOUS; SUBCUTANEOUS at 12:44

## 2018-03-16 RX ADMIN — SODIUM CHLORIDE, SODIUM LACTATE, POTASSIUM CHLORIDE, AND CALCIUM CHLORIDE 50 ML/HR: 600; 310; 30; 20 INJECTION, SOLUTION INTRAVENOUS at 08:37

## 2018-03-16 RX ADMIN — LORAZEPAM 1 MG: 2 INJECTION INTRAMUSCULAR; INTRAVENOUS at 01:13

## 2018-03-16 RX ADMIN — OCTREOTIDE ACETATE 50 MCG: 50 INJECTION, SOLUTION INTRAVENOUS; SUBCUTANEOUS at 17:50

## 2018-03-16 RX ADMIN — HYDROMORPHONE HYDROCHLORIDE 1.5 MG: 2 INJECTION INTRAMUSCULAR; INTRAVENOUS; SUBCUTANEOUS at 16:50

## 2018-03-16 RX ADMIN — INSULIN LISPRO 3 UNITS: 100 INJECTION, SOLUTION INTRAVENOUS; SUBCUTANEOUS at 12:44

## 2018-03-16 RX ADMIN — FAMOTIDINE: 10 INJECTION, SOLUTION INTRAVENOUS at 19:19

## 2018-03-16 RX ADMIN — DAKIN'S SOLUTION 0.125% (QUARTER STRENGTH): 0.12 SOLUTION at 10:06

## 2018-03-16 RX ADMIN — OCTREOTIDE ACETATE 50 MCG: 50 INJECTION, SOLUTION INTRAVENOUS; SUBCUTANEOUS at 21:49

## 2018-03-16 RX ADMIN — Medication 10 ML: at 23:34

## 2018-03-16 RX ADMIN — Medication: at 19:15

## 2018-03-16 RX ADMIN — I.V. FAT EMULSION 250 ML: 20 EMULSION INTRAVENOUS at 19:27

## 2018-03-16 RX ADMIN — INSULIN LISPRO 3 UNITS: 100 INJECTION, SOLUTION INTRAVENOUS; SUBCUTANEOUS at 07:03

## 2018-03-16 RX ADMIN — PROCHLORPERAZINE EDISYLATE 5 MG: 5 INJECTION INTRAMUSCULAR; INTRAVENOUS at 00:33

## 2018-03-16 RX ADMIN — NYSTATIN: 100000 CREAM TOPICAL at 18:00

## 2018-03-16 RX ADMIN — Medication 10 ML: at 10:15

## 2018-03-16 RX ADMIN — Medication 10 ML: at 10:14

## 2018-03-16 RX ADMIN — PROCHLORPERAZINE EDISYLATE 5 MG: 5 INJECTION INTRAMUSCULAR; INTRAVENOUS at 23:34

## 2018-03-16 RX ADMIN — ONDANSETRON HYDROCHLORIDE 4 MG: 2 INJECTION INTRAMUSCULAR; INTRAVENOUS at 21:48

## 2018-03-16 RX ADMIN — Medication: at 08:33

## 2018-03-16 RX ADMIN — INSULIN LISPRO 3 UNITS: 100 INJECTION, SOLUTION INTRAVENOUS; SUBCUTANEOUS at 19:21

## 2018-03-16 RX ADMIN — NYSTATIN: 100000 CREAM TOPICAL at 10:14

## 2018-03-16 NOTE — PROGRESS NOTES
Bedside and Verbal shift change report given to gonzalez (oncoming nurse) by robert (offgoing nurse). Report included the following information SBAR, Kardex, Intake/Output, Recent Results and Cardiac Rhythm sinus tach.

## 2018-03-16 NOTE — PROGRESS NOTES
Progress Note    Patient: Kristy Weeks MRN: [id number]  SSN: [id number]    YOB: 1975  Age: 38 y.o. Sex: female      Admit Date: 3/7/2018    9 Days Post-Op    Procedure:  Procedure(s):  EXPLORATORY LAPAROTOMY, LYSIS OF ADHESION X 5HOURS, SMALL BOWEL FISTULA TAKE DOWN X 2 AND WOUND VAC PLACEMENT    Subjective:     No acute surgical issues. Pt noted to have enteric bilious drainage from midline wound. Ostomy is minimally productive. NG tube with light scan bilious drainage. Hemoglobin stable after transfusion. Objective:     Visit Vitals    /64 (BP 1 Location: Left arm, BP Patient Position: At rest)    Pulse (!) 128    Temp 99 °F (37.2 °C)    Resp 20    Ht 5' 4\" (1.626 m)    Wt 337 lb 11.9 oz (153.2 kg)    SpO2 98%    BMI 57.97 kg/m2       Temp (24hrs), Av.3 °F (36.8 °C), Min:97.7 °F (36.5 °C), Max:99 °F (37.2 °C)        Physical Exam:    Gen:  NAD  Pulm:  Unlabored  Abd:  S/ND/appropriate TTP  Wound:  Large open midline abdominal wound with minimal fibrinous exudate at 3 and 11 oclock. Enteric content welling up from suture knot area.     Recent Results (from the past 24 hour(s))   TYPE + CROSSMATCH    Collection Time: 03/15/18 10:58 AM   Result Value Ref Range    Crossmatch Expiration 2018     ABO/Rh(D) B POSITIVE     Antibody screen NEG     Comment PREVIOUSLY IDENTIFIED ANTI JKA     Unit number [id number]     Blood component type RC LR     Unit division 00     Status of unit TRANSFUSED     ANTIGEN/ANTIBODY INFO Jk(A) NEGATIVE,     Crossmatch result Compatible     Unit number [id number]     Blood component type RC LR,2     Unit division 00     Status of unit TRANSFUSED     ANTIGEN/ANTIBODY INFO Jk(A) NEGATIVE,     Crossmatch result Compatible     Unit number [id number]     Blood component type RC LR,1     Unit division 00     Status of unit TRANSFUSED     ANTIGEN/ANTIBODY INFO Jk(A) NEGATIVE,     Crossmatch result Compatible    GLUCOSE, POC    Collection Time: 03/15/18 11:04 AM   Result Value Ref Range    Glucose (POC) 205 (H) 65 - 100 mg/dL    Performed by Chacorta Valdez, POC    Collection Time: 03/15/18  5:32 PM   Result Value Ref Range    Glucose (POC) 176 (H) 65 - 100 mg/dL    Performed by [facility] [employer]    GLUCOSE, POC    Collection Time: 03/15/18 11:06 PM   Result Value Ref Range    Glucose (POC) 136 (H) 65 - 100 mg/dL    Performed by [address] [place] [address], BASIC    Collection Time: 03/16/18  4:27 AM   Result Value Ref Range    Sodium 139 136 - 145 mmol/L    Potassium 4.3 3.5 - 5.1 mmol/L    Chloride 107 97 - 108 mmol/L    CO2 24 21 - 32 mmol/L    Anion gap 8 5 - 15 mmol/L    Glucose 183 (H) 65 - 100 mg/dL    BUN 21 (H) 6 - 20 MG/DL    Creatinine 0.97 0.55 - 1.02 MG/DL    BUN/Creatinine ratio 22 (H) 12 - 20      GFR est AA >60 >60 ml/min/1.73m2    GFR est non-AA >60 >60 ml/min/1.73m2    Calcium 8.1 (L) 8.5 - 10.1 MG/DL   CBC WITH AUTOMATED DIFF    Collection Time: 03/16/18  4:29 AM   Result Value Ref Range    WBC 9.9 3.6 - 11.0 K/uL    RBC 2.89 (L) 3.80 - 5.20 M/uL    HGB 8.9 (L) 11.5 - 16.0 g/dL    HCT 26.9 (L) 35.0 - 47.0 %    MCV 93.1 80.0 - 99.0 FL    MCH 30.8 26.0 - 34.0 PG    MCHC 33.1 30.0 - 36.5 g/dL    RDW 20.2 (H) 11.5 - 14.5 %    PLATELET 88 (L) 073 - 400 K/uL    MPV 10.6 8.9 - 12.9 FL    NRBC 3.4 (H) 0  WBC    ABSOLUTE NRBC 0.34 (H) 0.00 - 0.01 K/uL    NEUTROPHILS 56 32 - 75 %    BAND NEUTROPHILS 3 0 - 6 %    LYMPHOCYTES 28 12 - 49 %    MONOCYTES 4 (L) 5 - 13 %    EOSINOPHILS 0 0 - 7 %    BASOPHILS 1 0 - 1 %    METAMYELOCYTES 7 (H) 0 %    MYELOCYTES 1 (H) 0 %    IMMATURE GRANULOCYTES 0 %    ABS. NEUTROPHILS 5.8 1.8 - 8.0 K/UL    ABS. LYMPHOCYTES 2.8 0.8 - 3.5 K/UL    ABS. MONOCYTES 0.4 0.0 - 1.0 K/UL    ABS. EOSINOPHILS 0.0 0.0 - 0.4 K/UL    ABS. BASOPHILS 0.1 0.0 - 0.1 K/UL    ABS. IMM.  GRANS. 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 Monitor fistula output  - Continue NG tube to suction  - Continue PCA pain medication  - Labs in am  - Acute anemia from Enoxaparin therapy:  Resume lovenox.    - Trial of Octreotide for EC fistula    Signed By: Brennen White MD     March 16, 2018

## 2018-03-16 NOTE — ROUTINE PROCESS
Bedside and Verbal shift change report given to Amador (oncoming nurse) by Román Bullock (offgoing nurse). Report included the following information SBAR, Procedure Summary, Accordion, Recent Results and Med Rec Status.

## 2018-03-16 NOTE — WOUND CARE
WOCN Note:     Follow-up visit for open surgical incision to abdomen. Seen with Dr. Hillary Serrano today. Chart shows:  Admitted for fistula takdown 3/7/18 by Dr. Hillary Serrano with [facility] placement in [address] [place] [address]; history of multiple abdominal surgeries and Crohns disease. Admitted from home. Mom at bedside today. Hgb: 8.9      Assessment:   Patient is A&O x 4 and mobile but debilitated s/p abdominal surgery. Encouraged her to walk. Bed: total care bariatric   Pure Wick in use. Using PCA for pain. NG to wall suction.       1. Midline abdominal surgical wound = 23.5 x 14 x 6 cm. 90% pink moist wound base with granulation buds noted and 10% scattered stained areas of green. Slight malodor noted today. Irrigated with Dakin's.  ~300 cc of dark green in wall cannister that is connected via red rubber cath resting in wound bed. The gauze packing is saturated with green drainage. Red rubber cath is protected by a \"sandwich\" of 2 pieces of Mepitel One and placed in bottom/lower end of wound bed. 2 rolls of moist gauze packing placed around and red rubber secured to abdomen with tape and connected to wall suction @ 50. ABD toppers and secured with tape. Colostomy: stoma is red & moist with mucosal transplantation noted but skin intact and without redness or irritaiton; Small amount of soft brown output; 2-piece flat pouch applied with skin prep.      Wound Recommendations:    Change abdominal dressing daily as described above. Skin Care & Pressure Relief Recommendations:  Minimize layers of linen/pads under patient to optimize support surface. Turn/reposition approximately every 2 hours and offload heels. Manage incontinence / promote continence; Aloe Vesta to buttocks and sacrum daily and as needed with incontinence care  Bariatric bed. Discussed above plan with patient, her mother, and RN.     Transition of Care: Plan to follow as needed while admitted to [facility].    YOLANDA PugaN, RN, [facility]  Certified Wound, Ostomy, Continence Nurse  office [phone]  pager [phone] or call  to page

## 2018-03-16 NOTE — PROGRESS NOTES
NUTRITION       Recommendations:  1. Increase AA concentration in TPN to meet 100% of estimated protein needs and consider increasing TPN volume, which would also allow for LR to be adjusted/discontinued and free up one lumen in PICC. -- Goal: 6%AA D20 @ 83 mL/hr + 20% lipids, 250 mL 3x/week  -- Insulin added per MD 10 units/L (20 units total). 2. If PT is able to mobilize patient today, please rezero the bedscale or obtain via lift scale (current weight is up 16.2 kg from admission and difficult to assess accuracy). 3. May need to adjust Marinol order if NGT to remain to suction and unable to be clamped    NGT output 1130 mL yesterday. No po documentation with ice chips, etc recorded overnight, and surgeon reporting that he resumed suction on NGT and 500 additional mL came out this morning. Current TPN: 5%AA D20 @ 63 mL/hr + MVI, thiamine (100 mg), famotidine 40 mg + 20% lipids, 500 mL 3x/week  -- This is meeting a daily average of 1759 kcal, 76 g protein in 1512 mL (2012 mL on lipid days)-- meeting 100% and 69% of lower end estimated kcal and protein needs, respectively. Spoke with Dr. Brent Serrano regarding increasing TPN volume and concentration. MD prefers to continue with 24 hour TPN infusion. Above goal with increased volume and AA concentration will provide a daily average of 2047 kcal, 120 g protein in 1992 mL (2492 mL on lipid days)-- meeting 100% of estimated needs. GIR 1.8 mg/kg/min based on current weight; however, may not be accurate if current weight is not accurate. Pt required 8 units of correction scale (insulin resistant) yesterday. Insulin added to TPN today. IVF (LR) providing an additional 1200 mL/day (2712 mL, 3212 mL on lipid days). MD to discontinue LR once TPN volume increased. Pt's weight has been stable over the past 3 days, but is up 16.2 kg since admission. Would be beneficial to rezero the bedscale or lift scale weight.     Last 3 Recorded Weights in this Encounter    03/14/18 0305 03/15/18 0429 03/16/18 0002   Weight: 153.2 kg (337 lb 12.8 oz) 152.8 kg (336 lb 13.8 oz) 153.2 kg (337 lb 11.9 oz)     Estimated Nutrition Needs:   Kcals/day: 1507 Kcals/day (9810-2560 kcal/day (11-15 kcal/kg))  Protein: 110 g (0.8 g/kg)  Fluid: 2000 ml (or per output)     Based On: Kcal/kg - specify (Comment)  Weight Used: Actual wt (137 kg (need updated wt))    RD to follow.     [address]

## 2018-03-16 NOTE — PROGRESS NOTES
Problem: Mobility Impaired (Adult and Pediatric)  Goal: *Acute Goals and Plan of Care (Insert Text)  Physical Therapy Goals  Revisited 3/16/2018, goals remain appropriate, carry over    Physical Therapy Goals  Initiated 3/8/2018  1. Patient will move from supine to sit and sit to supine  in bed with moderate assistance  within 7 day(s). 2.  Patient will transfer from bed to chair and chair to bed with moderate assistance  using the least restrictive device within 7 day(s). 3.  Patient will perform sit to stand with moderate assistance  within 7 day(s). 4.  PT will assess gait with the least restrictive device within 7 day(s). physical Therapy TREATMENT: WEEKLY REASSESSMENT  Patient: Maria Luz Blevins (38 y.o. female)  Date: 3/16/2018  Diagnosis: FISTULA  Small bowel fistula <principal problem not specified>  Procedure(s) (LRB):  EXPLORATORY LAPAROTOMY, LYSIS OF ADHESION X 5HOURS, SMALL BOWEL FISTULA TAKE DOWN X 2 AND WOUND VAC PLACEMENT (N/A) 9 Days Post-Op  Precautions:   fall, contact   Chart, physical therapy assessment, plan of care and goals were reviewed. ASSESSMENT:  Pt received in bed agreeable to Pt with coaxing, initially requesting to defer PT until Monday. Had her warm up with follow ex 10 reps each: heel slides (with assist), ankle pumps, quad sets and glut sets. Then worked on rolling in bed, see subjective. Gave pt a pillow to use to splint against her abdomen and she did not use it. She was total assist to roll both left and right and post rolling she rated her pain at surgical site at 9/10. After a few minutes she reported that her pain was improving and was willing to try bed to chair position. Incrementally  was able to get bed to modified chair position with the [facility] at 45%, pt clearly anxious as she approached this position. I asked her to set a goal for how long she stayed in this position and she stated 20 minutes.  I encouraged her to try for 30 minutes if she could make it to 20 minutes. Discussed with her nurse. For the weekend I recommend trying constantin up to a stretcher chair as tolerated. PT will return after the weekend. Patient's progression toward goals since last assessment: pt has not met goals. She has been limited by anxiety and pain and tends to refuse therapy. She appears to be self limiting. She reports working on ex on her own in bed. Gains are slow goals were carried over and might require downgrading if not making gains. At this point anticipate likely need for rehab. Per chart pt has been to [facility] in the past.     PLAN:  Goals have been updated based on progression since last assessment. Patient continues to benefit from skilled intervention to address the above impairments. Continue to follow the patient 5 times a week to address goals. Planned Interventions:  [x]              Bed Mobility Training             []       Neuromuscular Re-Education  [x]              Transfer Training                   []       Orthotic/Prosthetic Training  []              Gait Training                         []       Modalities  [x]              Therapeutic Exercises           []       Edema Management/Control  [x]              Therapeutic Activities            [x]       Patient and Family Training/Education  []              Other (comment):  Discharge Recommendations: Rehab and To Be Determined  Further Equipment Recommendations for Discharge: to be determined      SUBJECTIVE:   Patient stated Qian Ortiz I ask my mom a question, mom is it ok if I roll?     OBJECTIVE DATA SUMMARY:   Chart checked, pt cleared by nursing  Critical Behavior:  Neurologic State: Alert  Orientation Level: Oriented X4  Cognition: Follows commands       Strength:          decreased functional                Functional Mobility Training:  Bed Mobility:  Rolling:  Total assistance, nearly max                 Transfers:                     not assessed              Balance:   n/a  Ambulation/Gait Training: n/a  Stairs:           Neuro Re-Education:    Therapeutic Exercises:   Refer to assessment above  Pain:  Pain Scale 1: Visual  Pain Intensity 1: 5 at rest and 9 while rolling in bed  Pain Location 1: Abdomen  Pain Orientation 1: Anterior  Pain Description 1: Aching  Pain Intervention(s) 1: Encouraged PCA  Activity Tolerance:   HR ranged 124-143  Please refer to the flowsheet for vital signs taken during this treatment.   After treatment:   []  Patient left in no apparent distress sitting up in chair  [x]  Patient left in no apparent distress in bed to modified chair position  [x]  Call bell left within reach  [x]  Nursing notified  [x]  Caregiver present  []  Bed alarm activated    COMMUNICATION/COLLABORATION:   The patients plan of care was discussed with: Registered Nurse    Patrica Huggins   Time Calculation: 25 mins

## 2018-03-16 NOTE — PROGRESS NOTES
Palliative Medicine Consult  Heath: [phone] ([phone])    Patient Name: Mesha Javed  YOB: 1975    Date of Initial Consult: 3/12/18  Reason for Consult: Pain management, chronic and post op   Requesting Provider: Rajni   Primary Care Physician: Zafar Johnson MD     SUMMARY:   Mesha Javed is a 38 y.o. with a past history of Crohn's disease (followed by Dr Taylor Contreras), mult surgeries s/p total colectomy w/ end ileostomy, 6/2017 ex lap with small bowel perforation, 6/28/17 ex lap, LPA, repair or enterotomy, SMA stent on L, 7/16/2917 ex lap, KATHRINE, fistula exclusion w/ feeding tube placement and prolonged TPN who was admitted on 3/7/2018 from home for planned surgery, s/p ex lap w/ extensive KATHRINE and small bowel fistula take down, wound vac placement. Known to our team during past hospitalizations. Has had lengthy hospital stays w/ mult complications. Has required [facility] stays in past. Has chronic pain from Crohn's disease (and has not been able to tolerate home Crohn's meds recently due to acute issues- had been on steroids, stopped prior to surgery). Discussion of Remicaide in future. Current medical issues leading to Palliative Medicine involvement include: pain management. Patient's mother, Abimael Arguello is NOK.  reviewed, no abberrancies- one prescriber for opioids and one pharmacy. Most recently filled 3/9/18: MSContin 100mg tid and Morphine IR 30mg- 8 tabs a day. Has been tried on equivalent dose of Fentanyl patch w/out same benefit. PALLIATIVE DIAGNOSES:   1. Acute post operative pain in abdomen  2. Chronic abdominal pain from Cronh's disease and hx of surgeries  3. Nausea  4. Anxiety   5. Feeding difficulties due to medical condition- has been on TPN for several months  6. Edema   7. L ankle pain s/p fall, normal XRs  8. Grief/depressive sx   9. Constipation      PLAN:   1.  PCA use is mostly 1-2 doses / hr in last 24h, in last hr 3x / hr; last PRN dilaudid 3/14; think she may have worse pain now from sitting upright for about an hour today; mom and pt deny untoward effects of dilaudid such as confusion / sedation, continue close assessment  2. Dose 1.5mg IV dilaudid now, then 1.5mg IV dilaudid q3h PRN for pain not controlled with PCA  3. Maintain PCA present doses -- 0.6mg/hr, 0.5mg q6min  4. Discussed with pt, mom in room  5. Abd pain from surgical incision and wound care as well as underlying chronic pain from Crohn's which is worse when off her steroids. 6. Encourage pt to work w/ therapies, allow bed mobility which is key to her recovery and her ability to get home. 7. Following to assist w/ transition to po medication/home regimen as heals. 8. Bowel regimen, anti-emetics per surgery. 9. Anxiety significant. Will cont to have pastoral care see and we will cont to follow. 10. Communicated plan of care with: Palliative IDT; Bessie Etienne RN       GOALS OF CARE / TREATMENT PREFERENCES:     GOALS OF CARE:  Patient/Health Care Proxy Stated Goals:  (Sx management )      TREATMENT PREFERENCES:   Code Status: Full Code    Advance Care Planning:  Advance Care Planning 3/15/2018   Patient's Healthcare Decision Maker is: Named in scanned ACP document   Primary Decision Maker Name Chavo Cuevas   Primary Decision Maker Phone Number [phone]   Primary Decision Maker Relationship to Patient Parent   Secondary Decision Maker Name Jeff Quinones   Secondary Decision Maker Phone Number [phone]   Secondary Decision Maker Relationship to Patient Unknown   Confirm Advance Directive Yes, on file   Patient Would Like to Complete Advance Directive -   Does the patient have other document types -       Medical Interventions: Full interventions           Other:    As far as possible, the palliative care team has discussed with patient / health care proxy about goals of care / treatment preferences for patient.            HISTORY:     Reviewed vitals, I/O's, labs, imaging, MAR, notes. Tachycardic   TPN, txfused rbc, 1130 emesis/ng output, 240 drain, no stool recorded  hgb up after txfusion, na and cr wnl    MAR  Albuterol, last dose 3/14  Marinol, missing doses  Dilaudid 2mg IV q3h PRN, 2 doses 3/14, last time  Dilaudid pca, 0.6mg/hr basal, 0.5mg q6min pca dose  Ativan 1mg IV q6h PRN, 3 doses 3/14, 1 dose 3/16 at 0100  Octreotide  Zofran, last doses 3/14  Compazine, dose 3/16 1201am  Phenergan, no recent dose  Scopolamine patch        HPI/SUBJECTIVE:    The patient is:   [x] Verbal and participatory  [] Non-participatory due to:     Pain about 7/10, worse from sitting up earlier. Nausea ok for now. No sob.     Clinical Pain Assessment (nonverbal scale for severity on nonverbal patients):   Clinical Pain Assessment  Severity: 7  Location: Abdomen   Character: Sharp and aching   Duration: chronic and since surgery   Effect: harder to move   Factors: worse w/ movement and wound care  Frequency: constant          Duration: for how long has pt been experiencing pain (e.g., 2 days, 1 month, years)  Frequency: how often pain is an issue (e.g., several times per day, once every few days, constant)     FUNCTIONAL ASSESSMENT:     Palliative Performance Scale (PPS):  PPS: 60       PSYCHOSOCIAL/SPIRITUAL SCREENING:     Palliative IDT has assessed this patient for cultural preferences / practices and a referral made as appropriate to needs (Cultural Services, Patient Advocacy, Ethics, etc.)    Advance Care Planning:  Advance Care Planning 3/15/2018   Patient's Healthcare Decision Maker is: Named in scanned ACP document   Primary Decision Maker Name Nereida Jade   Primary Decision Maker Phone Number [phone]   Primary Decision Maker Relationship to Patient Parent   Secondary Decision Maker Name Hamilton Babin   Secondary Decision Maker Phone Number [phone]   Secondary Decision Maker Relationship to Patient Unknown   Confirm Advance Directive Yes, on file   Patient Would Like to Complete Advance Directive -   Does the patient have other document types -       Any spiritual / Adventism concerns:  [] Yes /  [x] No    Caregiver Burnout:  [] Yes /  [x] No /  [] No Caregiver Present      Anticipatory grief assessment:   [x] Normal  / [] Maladaptive       ESAS Anxiety: Anxiety: 3    ESAS Depression: Depression: 2        REVIEW OF SYSTEMS:     Positive and pertinent negative findings in ROS are noted above in HPI. The following systems were [x] reviewed / [] unable to be reviewed as noted in HPI  Other findings are noted below. Systems: constitutional, ears/nose/mouth/throat, respiratory, gastrointestinal, genitourinary, musculoskeletal, integumentary, neurologic, psychiatric, endocrine. Positive findings noted below. Modified ESAS Completed by: provider   Fatigue: 3 Drowsiness: 0   Depression: 2 Pain: 7   Anxiety: 3 Nausea: 1   Anorexia: 8 Dyspnea: 0     Constipation: Yes              PHYSICAL EXAM:     From RN flowsheet:  Wt Readings from Last 3 Encounters:   03/16/18 153.2 kg (337 lb 11.9 oz)   03/05/18 138 kg (304 lb 3.2 oz)   02/28/18 138.8 kg (306 lb)     Blood pressure 108/56, pulse (!) 123, temperature 99.3 °F (37.4 °C), resp. rate 20, height 5' 4\" (1.626 m), weight 153.2 kg (337 lb 11.9 oz), SpO2 92 %.     Pain Scale 1: Visual  Pain Intensity 1: 0  Pain Onset 1: chronic  Pain Location 1: Abdomen  Pain Orientation 1: Anterior  Pain Description 1: Aching  Pain Intervention(s) 1: Encouraged PCA  Last bowel movement, if known: stool output in ostomy     Constitutional: awake, alert, oriented, no sedation or confusion  Eyes: pupils equal, anicteric  ENMT: no nasal discharge, moist mucous membranes  Cardiovascular: regular rhythm, tachycardic at baseline  Respiratory: breathing not labored, symmetric  Gastrointestinal: soft , TTP, hypoactive bowel sounds, ostomy, midline incisions c/d/i  Musculoskeletal: no deformity, TTP over L ankle  Skin: warm, dry, pale  Neurologic: following commands, moving all extremities  Psychiatric: full affect, no hallucinations         HISTORY:     Active Problems:    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)      Past Medical History:   Diagnosis Date    Adverse effect of anesthesia     WOKE DURING SURGERY    Arthritis     Blood clot in vein 2017    STOMACH    Crohn's disease ([facility] [address] 8/15/2011    DVT (deep venous thrombosis) ([facility] [address] 8/15/2011    LEFT LEG    Edema     GENERALIZED R/T TPN; WAIST DOWN    Incarcerated ventral hernia 8/15/2011    Lupus     Lyme disease     Psychiatric disorder     ANXIETY    Right flank pain 8/22/2011    Seizures ([facility] [address] 1990    LAST AT AGE 17    Stroke [facility]) 1990    age 17; A WEEK POST OP, HAD SEIZURES AND STROKE, WAS IN COMA FOR A MONTH    Thyroid disease     HYPO-NO MEDS      Past Surgical History:   Procedure Laterality Date    HX APPENDECTOMY      HX GYN  2015    HIDRADENTIS LABIA    HX OTHER SURGICAL      multiple procedures related to her Crohn's disease    HX OTHER SURGICAL      ABDOMINAL SURGERY REMOVING COLON, 2/3 STOMACH, 1/3 SMALL INTESTINE    [facility] LAP, INCISIONAL HERNIA REPAIR,INCARCERATED  9-10-08    dr. Aki Sarabia      Family History   Problem Relation Age of Onset    Hypertension Father     Stroke Father     Other Father      arthritis    Arthritis-osteo Father     Hypertension Mother     Arthritis-osteo Mother     Anesth Problems Neg Hx       History reviewed, no pertinent family history.   Social History   Substance Use Topics    Smoking status: Former Smoker     Packs/day: 1.00     Years: 16.00     Quit date: 2/27/2017    Smokeless tobacco: Former User      Comment: OFF AND ON    Alcohol use No     Allergies   Allergen Reactions    Sulfa (Sulfonamide Antibiotics) Anaphylaxis    Demerol [Meperidine] Rash    Soma [Carisoprodol] Rash and Nausea Only    Toradol [Ketorolac Tromethamine] Nausea and Vomiting      Current Facility-Administered Medications   Medication Dose Route Frequency    octreotide (SANDOSTATIN) injection 50 mcg 50 mcg IntraVENous TID    TPN ADULT - CENTRAL   IntraVENous CONTINUOUS    fat emulsion 20% (LIPOSYN, INTRAlipid) infusion 250 mL  250 mL IntraVENous Q MON, WED & FRI    0.9% sodium chloride infusion 250 mL  250 mL IntraVENous PRN    0.9% sodium chloride infusion 250 mL  250 mL IntraVENous PRN    sodium hypochlorite (QUARTER STRENGTH DAKIN'S) 0.125% irrigation (bottle)   Topical DAILY    TPN ADULT - CENTRAL   IntraVENous CONTINUOUS    nystatin (MYCOSTATIN) 100,000 unit/gram cream   Topical BID    prochlorperazine (COMPAZINE) with saline injection 5 mg  5 mg IntraVENous Q6H PRN    naloxone (NARCAN) injection 0.4 mg  0.4 mg IntraVENous EVERY 2 MINUTES AS NEEDED    HYDROmorphone (DILAUDID) injection 2 mg  2 mg IntraVENous Q3H PRN    0.9% sodium chloride infusion 250 mL  250 mL IntraVENous PRN    glucose chewable tablet 16 g  4 Tab Oral PRN    dextrose (D50W) injection syrg 12.5-25 g  12.5-25 g IntraVENous PRN    glucagon (GLUCAGEN) injection 1 mg  1 mg IntraMUSCular PRN    insulin lispro (HUMALOG) injection   SubCUTAneous Q6H    scopolamine (TRANSDERM-SCOP) 1 mg over 3 days 1 Patch  1 Patch TransDERmal Q72H    sodium chloride (NS) flush 20 mL  20 mL InterCATHeter PRN    sodium chloride (NS) flush 10 mL  10 mL InterCATHeter Q24H    sodium chloride (NS) flush 10 mL  10 mL InterCATHeter PRN    sodium chloride (NS) flush 10 mL  10 mL InterCATHeter Q8H    alteplase (CATHFLO) 1 mg in sterile water (preservative free) 1 mL injection  1 mg InterCATHeter PRN    bacitracin 500 unit/gram packet 1 Packet  1 Packet Topical PRN    sodium chloride (NS) flush 20 mL  20 mL InterCATHeter PRN    sodium chloride (NS) flush 10 mL  10 mL InterCATHeter Q24H    sodium chloride (NS) flush 10 mL  10 mL InterCATHeter PRN    sodium chloride (NS) flush 10 mL  10 mL InterCATHeter Q8H    dronabinol (MARINOL) capsule 2.5 mg  2.5 mg Oral BID    HYDROmorphone (PF) 15 mg/30 ml (DILAUDID) PCA   IntraVENous CONTINUOUS    phenol throat spray (CHLORASEPTIC) 1 Spray  1 Spray Oral PRN    ondansetron (ZOFRAN) injection 4 mg  4 mg IntraVENous Q4H PRN    promethazine (PHENERGAN) 12.5 mg in 0.9% sodium chloride 50 mL IVPB  12.5 mg IntraVENous Q6H PRN    LORazepam (ATIVAN) injection 1 mg  1 mg IntraVENous Q6H PRN    albuterol (PROVENTIL VENTOLIN) nebulizer solution 2.5 mg  2.5 mg Nebulization Q4H PRN          LAB AND IMAGING FINDINGS:     Lab Results   Component Value Date/Time    WBC 9.9 03/16/2018 04:29 AM    HGB 8.9 (L) 03/16/2018 04:29 AM    PLATELET 88 (L) 33/85/2430 04:29 AM     Lab Results   Component Value Date/Time    Sodium 139 03/16/2018 04:27 AM    Potassium 4.3 03/16/2018 04:27 AM    Chloride 107 03/16/2018 04:27 AM    CO2 24 03/16/2018 04:27 AM    BUN 21 (H) 03/16/2018 04:27 AM    Creatinine 0.97 03/16/2018 04:27 AM    Calcium 8.1 (L) 03/16/2018 04:27 AM    Magnesium 1.9 03/14/2018 02:35 AM    Phosphorus 3.8 03/14/2018 02:35 AM      Lab Results   Component Value Date/Time    AST (SGOT) 33 03/08/2018 03:11 AM    Alk. phosphatase 80 03/08/2018 03:11 AM    Protein, total 5.1 (L) 03/08/2018 03:11 AM    Albumin 2.3 (L) 03/08/2018 03:11 AM    Globulin 2.8 03/08/2018 03:11 AM     Lab Results   Component Value Date/Time    INR 1.0 12/07/2017 08:37 AM    Prothrombin time 10.0 12/07/2017 08:37 AM      No results found for: IRON, FE, TIBC, IBCT, PSAT, FERR   No results found for: PH, PCO2, PO2  No components found for: GLPOC   No results found for: CPK, CKMB             Total time:   Counseling / coordination time, spent as noted above:   > 50% counseling / coordination?:     Prolonged service was provided for  []30 min   []75 min in face to face time in the presence of the patient, spent as noted above. Time Start:   Time End:   Note: this can only be billed with 14229 (initial) or 73042 (follow up). If multiple start / stop times, list each separately.

## 2018-03-16 NOTE — DIABETES MGMT
DTC Progress Note    Recommendations/ Comments: Chart reviewed due to hyperglycemia likely due to TPN. Noted insulin is being added to TPN. Pt will be receiving 10 units of Regular insulin per liter for a total of 20 units delivered. DTC to follow. Current hospital DM medication: correction scale Humalog, resistant scale and with new order of TPN will be receiving 10 units of Regular insulin per liter for a total of 20 units delivered. Chart reviewed on [address] [address] . Patient is a 38 y.o. female with known diabetes on Humalog correction scale at home. A1c:   No results found for: HBA1C, HGBE8, PIP9DQZW    Recent Glucose Results: Lab Results   Component Value Date/Time     (H) 03/16/2018 04:27 AM    GLUCPOC 191 (H) 03/16/2018 12:11 PM    GLUCPOC 186 (H) 03/16/2018 07:01 AM    GLUCPOC 136 (H) 03/15/2018 11:06 PM        Lab Results   Component Value Date/Time    Creatinine 0.97 03/16/2018 04:27 AM     Estimated Creatinine Clearance: 114.5 mL/min (based on Cr of 0.97). Active Orders   Diet    DIET NPO With Ice Chips        PO intake: Patient Vitals for the past 72 hrs:   % Diet Eaten   03/13/18 1600 0 %       Will continue to follow as needed.     Thank you  Zaid Aldana RD, CDE

## 2018-03-16 NOTE — PROGRESS NOTES
Attempted follow-up visit with Ms. Kwong. Pt was resting and did not respond when I knocked and gently spoke her name. Pt case discussed with Palliative IDT and chart reviewed prior to visit. Chaplains will continue to follow as able; please page [phone] for support as needed.     Bhumika Barillas, Palliative

## 2018-03-17 ENCOUNTER — APPOINTMENT (OUTPATIENT)
Dept: CT IMAGING | Age: 43
DRG: 330 | End: 2018-03-17
Attending: SURGERY
Payer: MEDICARE

## 2018-03-17 LAB
ALBUMIN SERPL-MCNC: 1.6 G/DL (ref 3.5–5)
ALBUMIN/GLOB SERPL: 0.5 {RATIO} (ref 1.1–2.2)
ALP SERPL-CCNC: 77 U/L (ref 45–117)
ALT SERPL-CCNC: 17 U/L (ref 12–78)
ANION GAP SERPL CALC-SCNC: 8 MMOL/L (ref 5–15)
AST SERPL-CCNC: 7 U/L (ref 15–37)
BILIRUB SERPL-MCNC: 0.7 MG/DL (ref 0.2–1)
BUN SERPL-MCNC: 18 MG/DL (ref 6–20)
BUN/CREAT SERPL: 23 (ref 12–20)
CALCIUM SERPL-MCNC: 8.1 MG/DL (ref 8.5–10.1)
CHLORIDE SERPL-SCNC: 106 MMOL/L (ref 97–108)
CO2 SERPL-SCNC: 24 MMOL/L (ref 21–32)
CREAT SERPL-MCNC: 0.77 MG/DL (ref 0.55–1.02)
ERYTHROCYTE [DISTWIDTH] IN BLOOD BY AUTOMATED COUNT: 19.7 % (ref 11.5–14.5)
GLOBULIN SER CALC-MCNC: 3.5 G/DL (ref 2–4)
GLUCOSE BLD STRIP.AUTO-MCNC: 188 MG/DL (ref 65–100)
GLUCOSE BLD STRIP.AUTO-MCNC: 190 MG/DL (ref 65–100)
GLUCOSE BLD STRIP.AUTO-MCNC: 215 MG/DL (ref 65–100)
GLUCOSE SERPL-MCNC: 206 MG/DL (ref 65–100)
HCT VFR BLD AUTO: 24.9 % (ref 35–47)
HGB BLD-MCNC: 8.2 G/DL (ref 11.5–16)
MCH RBC QN AUTO: 30.9 PG (ref 26–34)
MCHC RBC AUTO-ENTMCNC: 32.9 G/DL (ref 30–36.5)
MCV RBC AUTO: 94 FL (ref 80–99)
NRBC # BLD: 0.17 K/UL (ref 0–0.01)
NRBC BLD-RTO: 2.4 PER 100 WBC
PLATELET # BLD AUTO: 70 K/UL (ref 150–400)
PMV BLD AUTO: 10.9 FL (ref 8.9–12.9)
POTASSIUM SERPL-SCNC: 4.1 MMOL/L (ref 3.5–5.1)
PROT SERPL-MCNC: 5.1 G/DL (ref 6.4–8.2)
RBC # BLD AUTO: 2.65 M/UL (ref 3.8–5.2)
SERVICE CMNT-IMP: ABNORMAL
SODIUM SERPL-SCNC: 138 MMOL/L (ref 136–145)
WBC # BLD AUTO: 7.1 K/UL (ref 3.6–11)

## 2018-03-17 PROCEDURE — 74177 CT ABD & PELVIS W/CONTRAST: CPT

## 2018-03-17 PROCEDURE — 82962 GLUCOSE BLOOD TEST: CPT

## 2018-03-17 PROCEDURE — 74011250636 HC RX REV CODE- 250/636: Performed by: PHYSICAL MEDICINE & REHABILITATION

## 2018-03-17 PROCEDURE — 74011636320 HC RX REV CODE- 636/320: Performed by: SURGERY

## 2018-03-17 PROCEDURE — 80053 COMPREHEN METABOLIC PANEL: CPT | Performed by: SURGERY

## 2018-03-17 PROCEDURE — 74011250636 HC RX REV CODE- 250/636: Performed by: EMERGENCY MEDICINE

## 2018-03-17 PROCEDURE — 74011250636 HC RX REV CODE- 250/636: Performed by: SURGERY

## 2018-03-17 PROCEDURE — 74011000258 HC RX REV CODE- 258: Performed by: SURGERY

## 2018-03-17 PROCEDURE — 3331090002 HH PPS REVENUE DEBIT

## 2018-03-17 PROCEDURE — 3331090001 HH PPS REVENUE CREDIT

## 2018-03-17 PROCEDURE — 74011000250 HC RX REV CODE- 250: Performed by: NURSE PRACTITIONER

## 2018-03-17 PROCEDURE — 85027 COMPLETE CBC AUTOMATED: CPT | Performed by: SURGERY

## 2018-03-17 PROCEDURE — 74011250637 HC RX REV CODE- 250/637: Performed by: NURSE PRACTITIONER

## 2018-03-17 PROCEDURE — 74011250636 HC RX REV CODE- 250/636: Performed by: NURSE PRACTITIONER

## 2018-03-17 PROCEDURE — 36592 COLLECT BLOOD FROM PICC: CPT

## 2018-03-17 PROCEDURE — 71260 CT THORAX DX C+: CPT

## 2018-03-17 PROCEDURE — 74011250637 HC RX REV CODE- 250/637: Performed by: PHYSICIAN ASSISTANT

## 2018-03-17 PROCEDURE — 74011250637 HC RX REV CODE- 250/637: Performed by: SURGERY

## 2018-03-17 PROCEDURE — 74011000258 HC RX REV CODE- 258: Performed by: NURSE PRACTITIONER

## 2018-03-17 PROCEDURE — 74011636637 HC RX REV CODE- 636/637: Performed by: SURGERY

## 2018-03-17 PROCEDURE — 65660000000 HC RM CCU STEPDOWN

## 2018-03-17 PROCEDURE — 74011636637 HC RX REV CODE- 636/637: Performed by: NURSE PRACTITIONER

## 2018-03-17 PROCEDURE — 36415 COLL VENOUS BLD VENIPUNCTURE: CPT | Performed by: SURGERY

## 2018-03-17 RX ORDER — ENOXAPARIN SODIUM 100 MG/ML
40 INJECTION SUBCUTANEOUS EVERY 24 HOURS
Status: DISCONTINUED | OUTPATIENT
Start: 2018-03-17 | End: 2018-05-23 | Stop reason: HOSPADM

## 2018-03-17 RX ORDER — SODIUM CHLORIDE 0.9 % (FLUSH) 0.9 %
10 SYRINGE (ML) INJECTION
Status: DISPENSED | OUTPATIENT
Start: 2018-03-17 | End: 2018-03-18

## 2018-03-17 RX ADMIN — INSULIN LISPRO 4 UNITS: 100 INJECTION, SOLUTION INTRAVENOUS; SUBCUTANEOUS at 19:02

## 2018-03-17 RX ADMIN — Medication: at 03:52

## 2018-03-17 RX ADMIN — HYDROMORPHONE HYDROCHLORIDE 1.5 MG: 2 INJECTION INTRAMUSCULAR; INTRAVENOUS; SUBCUTANEOUS at 20:59

## 2018-03-17 RX ADMIN — OCTREOTIDE ACETATE 50 MCG: 50 INJECTION, SOLUTION INTRAVENOUS; SUBCUTANEOUS at 22:41

## 2018-03-17 RX ADMIN — Medication 10 ML: at 11:08

## 2018-03-17 RX ADMIN — INSULIN LISPRO 3 UNITS: 100 INJECTION, SOLUTION INTRAVENOUS; SUBCUTANEOUS at 12:25

## 2018-03-17 RX ADMIN — Medication 10 ML: at 22:00

## 2018-03-17 RX ADMIN — DAKIN'S SOLUTION 0.125% (QUARTER STRENGTH): 0.12 SOLUTION at 11:07

## 2018-03-17 RX ADMIN — Medication 10 ML: at 15:24

## 2018-03-17 RX ADMIN — HYDROMORPHONE HYDROCHLORIDE 1.5 MG: 2 INJECTION INTRAMUSCULAR; INTRAVENOUS; SUBCUTANEOUS at 08:13

## 2018-03-17 RX ADMIN — PROCHLORPERAZINE EDISYLATE 5 MG: 5 INJECTION INTRAMUSCULAR; INTRAVENOUS at 16:15

## 2018-03-17 RX ADMIN — Medication: at 22:39

## 2018-03-17 RX ADMIN — OCTREOTIDE ACETATE 50 MCG: 50 INJECTION, SOLUTION INTRAVENOUS; SUBCUTANEOUS at 15:23

## 2018-03-17 RX ADMIN — Medication 10 ML: at 05:45

## 2018-03-17 RX ADMIN — NYSTATIN: 100000 CREAM TOPICAL at 19:03

## 2018-03-17 RX ADMIN — HYDROMORPHONE HYDROCHLORIDE 1.5 MG: 2 INJECTION INTRAMUSCULAR; INTRAVENOUS; SUBCUTANEOUS at 18:13

## 2018-03-17 RX ADMIN — Medication: at 11:03

## 2018-03-17 RX ADMIN — IOHEXOL 50 ML: 240 INJECTION, SOLUTION INTRATHECAL; INTRAVASCULAR; INTRAVENOUS; ORAL at 19:00

## 2018-03-17 RX ADMIN — ENOXAPARIN SODIUM 40 MG: 100 INJECTION SUBCUTANEOUS at 11:07

## 2018-03-17 RX ADMIN — IOPAMIDOL 100 ML: 755 INJECTION, SOLUTION INTRAVENOUS at 19:00

## 2018-03-17 RX ADMIN — HYDROMORPHONE HYDROCHLORIDE 1.5 MG: 2 INJECTION INTRAMUSCULAR; INTRAVENOUS; SUBCUTANEOUS at 03:52

## 2018-03-17 RX ADMIN — NYSTATIN: 100000 CREAM TOPICAL at 11:08

## 2018-03-17 RX ADMIN — INSULIN LISPRO 3 UNITS: 100 INJECTION, SOLUTION INTRAVENOUS; SUBCUTANEOUS at 07:23

## 2018-03-17 RX ADMIN — Medication 10 ML: at 15:23

## 2018-03-17 RX ADMIN — PIPERACILLIN AND TAZOBACTAM 3.38 G: 3; .375 INJECTION, POWDER, FOR SOLUTION INTRAVENOUS at 20:59

## 2018-03-17 RX ADMIN — ACETAMINOPHEN 1000 MG: 10 INJECTION, SOLUTION INTRAVENOUS at 00:22

## 2018-03-17 RX ADMIN — FAMOTIDINE: 10 INJECTION, SOLUTION INTRAVENOUS at 19:53

## 2018-03-17 RX ADMIN — OCTREOTIDE ACETATE 50 MCG: 50 INJECTION, SOLUTION INTRAVENOUS; SUBCUTANEOUS at 11:07

## 2018-03-17 NOTE — PROGRESS NOTES
General Surgery Daily Progress Note    Admit Date: 3/7/2018  Post-Operative Day: 10 Days Post-Op from Procedure(s):  EXPLORATORY LAPAROTOMY, LYSIS OF ADHESION X 5HOURS, SMALL BOWEL FISTULA TAKE DOWN X 2 AND WOUND VAC PLACEMENT     Subjective:     Last 24 hrs: Pt had fever to 101.1 this am a/w h/a. Nl WBC. Pain is controlled w/ PCA. On TPN  abd wound cont to leak requiring lg, supportive drsg. Objective:     Blood pressure 129/66, pulse (!) 121, temperature 98.1 °F (36.7 °C), resp. rate 18, height 5' 4\" (1.626 m), weight 337 lb 15.4 oz (153.3 kg), SpO2 99 %. Temp (24hrs), Av.3 °F (37.4 °C), Min:98.1 °F (36.7 °C), Max:101.1 °F (38.4 °C)      _____________________  Physical Exam:     Alert and Oriented, x3, in no acute distress. Cardiovascular: tachycardic - 120s, no peripheral edema  Lungs:CTAB anteriorally  Abdomen: lg open wound w grn-brown drainage; red rubber in wound bed draining same drainage to sxn. 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 Start abx if continues, though nl WBC  Renew TPN  Labs ordered  Add lovenox per Dr Gadiel Jackson  IS  Pain mgmt per palliative  Cont NGT for nausea mgmt    Data Review:    Recent Labs      18   03118   0429  03/15/18   0801  03/15/18   0608   WBC  7.1  9.9   --   9.8   HGB  8.2*  8.9*  5.4*  5.3*   HCT  24.9*  26.9*  16.8*  16.9*   PLT  70*  88*   --   106*     Recent Labs      18   03118   0427  03/15/18   0608   NA  138  139  140   K  4.1  4.3  4.2   CL  106  107  107   CO2  24  24  24   GLU  206*  183*  212*   BUN  18  21*  24*   CREA  0.77  0.97  1.11*   CA  8.1*  8.1*  8.1*   ALB  1.6*   --    --    SGOT  7*   --    --    ALT  17   --    --      No results for input(s): AML, LPSE in the last 72 hours.         ______________________  Medications:    Current Facility-Administered Medications   Medication Dose Route Frequency    octreotide (SANDOSTATIN) injection 50 mcg  50 mcg IntraVENous TID    TPN ADULT - CENTRAL   IntraVENous CONTINUOUS    fat emulsion 20% (LIPOSYN, INTRAlipid) infusion 250 mL  250 mL IntraVENous Q MON, WED & FRI    HYDROmorphone (DILAUDID) injection 1.5 mg  1.5 mg IntraVENous Q3H PRN    acetaminophen (OFIRMEV) infusion 1,000 mg  1,000 mg IntraVENous Q6H PRN    0.9% sodium chloride infusion 250 mL  250 mL IntraVENous PRN    0.9% sodium chloride infusion 250 mL  250 mL IntraVENous PRN    sodium hypochlorite (QUARTER STRENGTH DAKIN'S) 0.125% irrigation (bottle)   Topical DAILY    nystatin (MYCOSTATIN) 100,000 unit/gram cream   Topical BID    prochlorperazine (COMPAZINE) with saline injection 5 mg  5 mg IntraVENous Q6H PRN    naloxone (NARCAN) injection 0.4 mg  0.4 mg IntraVENous EVERY 2 MINUTES AS NEEDED    0.9% sodium chloride infusion 250 mL  250 mL IntraVENous PRN    glucose chewable tablet 16 g  4 Tab Oral PRN    dextrose (D50W) injection syrg 12.5-25 g  12.5-25 g IntraVENous PRN    glucagon (GLUCAGEN) injection 1 mg  1 mg IntraMUSCular PRN    insulin lispro (HUMALOG) injection   SubCUTAneous Q6H    scopolamine (TRANSDERM-SCOP) 1 mg over 3 days 1 Patch  1 Patch TransDERmal Q72H    sodium chloride (NS) flush 20 mL  20 mL InterCATHeter PRN    sodium chloride (NS) flush 10 mL  10 mL InterCATHeter Q24H    sodium chloride (NS) flush 10 mL  10 mL InterCATHeter PRN    sodium chloride (NS) flush 10 mL  10 mL InterCATHeter Q8H    alteplase (CATHFLO) 1 mg in sterile water (preservative free) 1 mL injection  1 mg InterCATHeter PRN    bacitracin 500 unit/gram packet 1 Packet  1 Packet Topical PRN    sodium chloride (NS) flush 20 mL  20 mL InterCATHeter PRN    sodium chloride (NS) flush 10 mL  10 mL InterCATHeter Q24H    sodium chloride (NS) flush 10 mL  10 mL InterCATHeter PRN    sodium chloride (NS) flush 10 mL  10 mL InterCATHeter Q8H    dronabinol (MARINOL) capsule 2.5 mg  2.5 mg Oral BID    HYDROmorphone (PF) 15 mg/30 ml (DILAUDID) PCA   IntraVENous CONTINUOUS    phenol throat spray (CHLORASEPTIC) 1 Spray  1 Spray Oral PRN    ondansetron (ZOFRAN) injection 4 mg  4 mg IntraVENous Q4H PRN    promethazine (PHENERGAN) 12.5 mg in 0.9% sodium chloride 50 mL IVPB  12.5 mg IntraVENous Q6H PRN    LORazepam (ATIVAN) injection 1 mg  1 mg IntraVENous Q6H PRN    albuterol (PROVENTIL VENTOLIN) nebulizer solution 2.5 mg  2.5 mg Nebulization Q4H PRN       Claudette Bhatia NP  3/17/2018        Pt seen and examined   Agree with above  Had 1 episode of fever - WBC Normal   Gen- Alert in NAD  Lungs- CTA  H-tachy  Abd- soft with enteric drainage in the wound. Plan  Continue wound management  Will obtain CT scan to eval fever  Continue TPN  Lovenox.

## 2018-03-17 NOTE — PROGRESS NOTES
CHRIS drain with no drainage over night - stitches noted to no longer be in place. Pt complaining of tenderness at site. Lit Blue NP notified who ordered CHRIS to be pulled.

## 2018-03-17 NOTE — PROGRESS NOTES
Bedside shift change report given to Ashley Multani RN (oncoming nurse) by Alvarado Clark RN (offgoing nurse). Report included the following information SBAR, Kardex, Procedure Summary, Intake/Output, MAR, Recent Results and Cardiac Rhythm sinus tachycardia.

## 2018-03-18 LAB
ANION GAP SERPL CALC-SCNC: 9 MMOL/L (ref 5–15)
BASOPHILS # BLD: 0 K/UL (ref 0–0.1)
BASOPHILS NFR BLD: 0 % (ref 0–1)
BUN SERPL-MCNC: 17 MG/DL (ref 6–20)
BUN/CREAT SERPL: 25 (ref 12–20)
CALCIUM SERPL-MCNC: 8.2 MG/DL (ref 8.5–10.1)
CHLORIDE SERPL-SCNC: 103 MMOL/L (ref 97–108)
CO2 SERPL-SCNC: 24 MMOL/L (ref 21–32)
CREAT SERPL-MCNC: 0.68 MG/DL (ref 0.55–1.02)
DIFFERENTIAL METHOD BLD: ABNORMAL
EOSINOPHIL # BLD: 0.2 K/UL (ref 0–0.4)
EOSINOPHIL NFR BLD: 3 % (ref 0–7)
ERYTHROCYTE [DISTWIDTH] IN BLOOD BY AUTOMATED COUNT: 19 % (ref 11.5–14.5)
GLUCOSE BLD STRIP.AUTO-MCNC: 146 MG/DL (ref 65–100)
GLUCOSE BLD STRIP.AUTO-MCNC: 177 MG/DL (ref 65–100)
GLUCOSE BLD STRIP.AUTO-MCNC: 193 MG/DL (ref 65–100)
GLUCOSE BLD STRIP.AUTO-MCNC: 215 MG/DL (ref 65–100)
GLUCOSE SERPL-MCNC: 187 MG/DL (ref 65–100)
HCT VFR BLD AUTO: 30.4 % (ref 35–47)
HGB BLD-MCNC: 9.4 G/DL (ref 11.5–16)
IMM GRANULOCYTES # BLD: 0 K/UL
IMM GRANULOCYTES NFR BLD AUTO: 0 %
LYMPHOCYTES # BLD: 1.3 K/UL (ref 0.8–3.5)
LYMPHOCYTES NFR BLD: 22 % (ref 12–49)
MAGNESIUM SERPL-MCNC: 1.5 MG/DL (ref 1.6–2.4)
MCH RBC QN AUTO: 31.1 PG (ref 26–34)
MCHC RBC AUTO-ENTMCNC: 30.9 G/DL (ref 30–36.5)
MCV RBC AUTO: 100.7 FL (ref 80–99)
METAMYELOCYTES NFR BLD MANUAL: 3 %
MONOCYTES # BLD: 0.4 K/UL (ref 0–1)
MONOCYTES NFR BLD: 7 % (ref 5–13)
MYELOCYTES NFR BLD MANUAL: 4 %
NEUTS BAND NFR BLD MANUAL: 17 % (ref 0–6)
NEUTS SEG # BLD: 3.7 K/UL (ref 1.8–8)
NEUTS SEG NFR BLD: 44 % (ref 32–75)
NRBC # BLD: 0.06 K/UL (ref 0–0.01)
NRBC BLD-RTO: 1 PER 100 WBC
PHOSPHATE SERPL-MCNC: 3.8 MG/DL (ref 2.6–4.7)
PLATELET # BLD AUTO: 77 K/UL (ref 150–400)
PLATELET COMMENTS,PCOM: ABNORMAL
PMV BLD AUTO: 11.8 FL (ref 8.9–12.9)
POTASSIUM SERPL-SCNC: 3.8 MMOL/L (ref 3.5–5.1)
RBC # BLD AUTO: 3.02 M/UL (ref 3.8–5.2)
RBC MORPH BLD: ABNORMAL
SERVICE CMNT-IMP: ABNORMAL
SODIUM SERPL-SCNC: 136 MMOL/L (ref 136–145)
WBC # BLD AUTO: 6 K/UL (ref 3.6–11)
WBC MORPH BLD: ABNORMAL

## 2018-03-18 PROCEDURE — 85025 COMPLETE CBC W/AUTO DIFF WBC: CPT | Performed by: SURGERY

## 2018-03-18 PROCEDURE — 74011636637 HC RX REV CODE- 636/637: Performed by: SURGERY

## 2018-03-18 PROCEDURE — 74011250636 HC RX REV CODE- 250/636: Performed by: NURSE PRACTITIONER

## 2018-03-18 PROCEDURE — 74011000258 HC RX REV CODE- 258: Performed by: SURGERY

## 2018-03-18 PROCEDURE — 74011250636 HC RX REV CODE- 250/636: Performed by: PHYSICAL MEDICINE & REHABILITATION

## 2018-03-18 PROCEDURE — 3331090002 HH PPS REVENUE DEBIT

## 2018-03-18 PROCEDURE — 84100 ASSAY OF PHOSPHORUS: CPT | Performed by: NURSE PRACTITIONER

## 2018-03-18 PROCEDURE — 36415 COLL VENOUS BLD VENIPUNCTURE: CPT | Performed by: NURSE PRACTITIONER

## 2018-03-18 PROCEDURE — 83735 ASSAY OF MAGNESIUM: CPT | Performed by: NURSE PRACTITIONER

## 2018-03-18 PROCEDURE — 3331090001 HH PPS REVENUE CREDIT

## 2018-03-18 PROCEDURE — 74011250636 HC RX REV CODE- 250/636: Performed by: SURGERY

## 2018-03-18 PROCEDURE — 65660000000 HC RM CCU STEPDOWN

## 2018-03-18 PROCEDURE — 80048 BASIC METABOLIC PNL TOTAL CA: CPT | Performed by: NURSE PRACTITIONER

## 2018-03-18 PROCEDURE — 74011000250 HC RX REV CODE- 250: Performed by: SURGERY

## 2018-03-18 PROCEDURE — 74011250636 HC RX REV CODE- 250/636: Performed by: EMERGENCY MEDICINE

## 2018-03-18 RX ORDER — SODIUM CHLORIDE, SODIUM LACTATE, POTASSIUM CHLORIDE, CALCIUM CHLORIDE 600; 310; 30; 20 MG/100ML; MG/100ML; MG/100ML; MG/100ML
75 INJECTION, SOLUTION INTRAVENOUS CONTINUOUS
Status: DISCONTINUED | OUTPATIENT
Start: 2018-03-18 | End: 2018-03-19

## 2018-03-18 RX ORDER — DIPHENHYDRAMINE HYDROCHLORIDE 50 MG/ML
12.5-25 INJECTION, SOLUTION INTRAMUSCULAR; INTRAVENOUS
Status: DISCONTINUED | OUTPATIENT
Start: 2018-03-18 | End: 2018-03-19

## 2018-03-18 RX ORDER — MAGNESIUM SULFATE HEPTAHYDRATE 40 MG/ML
2 INJECTION, SOLUTION INTRAVENOUS ONCE
Status: COMPLETED | OUTPATIENT
Start: 2018-03-18 | End: 2018-04-11

## 2018-03-18 RX ORDER — ACETAMINOPHEN 10 MG/ML
1000 INJECTION, SOLUTION INTRAVENOUS
Status: DISCONTINUED | OUTPATIENT
Start: 2018-03-18 | End: 2018-03-19

## 2018-03-18 RX ADMIN — Medication: at 15:56

## 2018-03-18 RX ADMIN — INSULIN LISPRO 3 UNITS: 100 INJECTION, SOLUTION INTRAVENOUS; SUBCUTANEOUS at 19:02

## 2018-03-18 RX ADMIN — Medication 10 ML: at 09:47

## 2018-03-18 RX ADMIN — LORAZEPAM 1 MG: 2 INJECTION INTRAMUSCULAR; INTRAVENOUS at 00:10

## 2018-03-18 RX ADMIN — PIPERACILLIN AND TAZOBACTAM 3.38 G: 3; .375 INJECTION, POWDER, FOR SOLUTION INTRAVENOUS at 05:45

## 2018-03-18 RX ADMIN — HYDROMORPHONE HYDROCHLORIDE 1.5 MG: 2 INJECTION INTRAMUSCULAR; INTRAVENOUS; SUBCUTANEOUS at 21:25

## 2018-03-18 RX ADMIN — OCTREOTIDE ACETATE 50 MCG: 50 INJECTION, SOLUTION INTRAVENOUS; SUBCUTANEOUS at 21:25

## 2018-03-18 RX ADMIN — INSULIN LISPRO 3 UNITS: 100 INJECTION, SOLUTION INTRAVENOUS; SUBCUTANEOUS at 00:00

## 2018-03-18 RX ADMIN — MAGNESIUM SULFATE HEPTAHYDRATE 2 G: 40 INJECTION, SOLUTION INTRAVENOUS at 12:53

## 2018-03-18 RX ADMIN — HYDROMORPHONE HYDROCHLORIDE 1.5 MG: 2 INJECTION INTRAMUSCULAR; INTRAVENOUS; SUBCUTANEOUS at 10:04

## 2018-03-18 RX ADMIN — SODIUM CHLORIDE, SODIUM LACTATE, POTASSIUM CHLORIDE, AND CALCIUM CHLORIDE 75 ML/HR: 600; 310; 30; 20 INJECTION, SOLUTION INTRAVENOUS at 18:38

## 2018-03-18 RX ADMIN — ACETAMINOPHEN 1000 MG: 10 INJECTION, SOLUTION INTRAVENOUS at 20:41

## 2018-03-18 RX ADMIN — NYSTATIN: 100000 CREAM TOPICAL at 19:07

## 2018-03-18 RX ADMIN — DAKIN'S SOLUTION 0.125% (QUARTER STRENGTH): 0.12 SOLUTION at 09:00

## 2018-03-18 RX ADMIN — ONDANSETRON HYDROCHLORIDE 4 MG: 2 INJECTION INTRAMUSCULAR; INTRAVENOUS at 19:02

## 2018-03-18 RX ADMIN — Medication 10 ML: at 06:00

## 2018-03-18 RX ADMIN — Medication 10 ML: at 21:25

## 2018-03-18 RX ADMIN — DIPHENHYDRAMINE HYDROCHLORIDE 12.5 MG: 50 INJECTION, SOLUTION INTRAMUSCULAR; INTRAVENOUS at 16:39

## 2018-03-18 RX ADMIN — Medication: at 20:10

## 2018-03-18 RX ADMIN — OCTREOTIDE ACETATE 50 MCG: 50 INJECTION, SOLUTION INTRAVENOUS; SUBCUTANEOUS at 09:46

## 2018-03-18 RX ADMIN — HYDROMORPHONE HYDROCHLORIDE 1.5 MG: 2 INJECTION INTRAMUSCULAR; INTRAVENOUS; SUBCUTANEOUS at 05:38

## 2018-03-18 RX ADMIN — INSULIN LISPRO 3 UNITS: 100 INJECTION, SOLUTION INTRAVENOUS; SUBCUTANEOUS at 06:11

## 2018-03-18 RX ADMIN — FAMOTIDINE: 10 INJECTION, SOLUTION INTRAVENOUS at 19:03

## 2018-03-18 RX ADMIN — Medication: at 05:40

## 2018-03-18 RX ADMIN — INSULIN LISPRO 4 UNITS: 100 INJECTION, SOLUTION INTRAVENOUS; SUBCUTANEOUS at 12:00

## 2018-03-18 RX ADMIN — HYDROMORPHONE HYDROCHLORIDE 1.5 MG: 2 INJECTION INTRAMUSCULAR; INTRAVENOUS; SUBCUTANEOUS at 15:16

## 2018-03-18 RX ADMIN — PIPERACILLIN AND TAZOBACTAM 3.38 G: 3; .375 INJECTION, POWDER, FOR SOLUTION INTRAVENOUS at 14:25

## 2018-03-18 RX ADMIN — OCTREOTIDE ACETATE 50 MCG: 50 INJECTION, SOLUTION INTRAVENOUS; SUBCUTANEOUS at 16:50

## 2018-03-18 RX ADMIN — SODIUM CHLORIDE, SODIUM LACTATE, POTASSIUM CHLORIDE, AND CALCIUM CHLORIDE 1000 ML: 600; 310; 30; 20 INJECTION, SOLUTION INTRAVENOUS at 16:42

## 2018-03-18 RX ADMIN — ENOXAPARIN SODIUM 40 MG: 100 INJECTION SUBCUTANEOUS at 09:46

## 2018-03-18 RX ADMIN — NYSTATIN: 100000 CREAM TOPICAL at 09:00

## 2018-03-18 RX ADMIN — PIPERACILLIN AND TAZOBACTAM 3.38 G: 3; .375 INJECTION, POWDER, FOR SOLUTION INTRAVENOUS at 20:41

## 2018-03-18 NOTE — PROGRESS NOTES
Patient and mother expressed concern about temperature. Temp has been in the 99's. Will continue to monitor and encourage incentive spirometer. Will notify MD if temp spikes higher than what it has been running.

## 2018-03-18 NOTE — PROGRESS NOTES
Problem: Falls - Risk of  Goal: *Absence of Falls  Document Marlene Fall Risk and appropriate interventions in the flowsheet.    Outcome: Progressing Towards Goal  Fall Risk Interventions:  Mobility Interventions: Strengthening exercises (ROM-active/passive), PT Consult for assist device competence, PT Consult for mobility concerns    Medication Interventions: Evaluate medications/consider consulting pharmacy    Elimination Interventions: Bed/chair exit alarm, Call light in reach, Toilet paper/wipes in reach    History of Falls Interventions: Consult care management for discharge planning, Evaluate medications/consider consulting pharmacy

## 2018-03-18 NOTE — PROGRESS NOTES
Bedside shift change report given to Radha Camejo RN (oncoming nurse) by Crystal Kovacs RN (offgoing nurse). Report included the following information SBAR, Kardex, Procedure Summary, Intake/Output, MAR, Recent Results and Cardiac Rhythm sinus tachycardia.

## 2018-03-18 NOTE — PROGRESS NOTES
Progress Note    Patient: Pradeep Love MRN: [id number]  SSN: [id number]    YOB: 1975  Age: 38 y.o. Sex: female      Admit Date: 3/7/2018    11 Days Post-Op    Procedure:  Procedure(s):  EXPLORATORY LAPAROTOMY, LYSIS OF ADHESION X 5HOURS, SMALL BOWEL FISTULA TAKE DOWN X 2 AND WOUND VAC PLACEMENT    Subjective:     Patient noted increased output from the ostomy. No other real complaints. Objective:     Visit Vitals    BP 91/48 (BP 1 Location: Left arm, BP Patient Position: At rest)    Pulse (!) 128    Temp 98.3 °F (36.8 °C)    Resp 20    Ht 5' 4\" (1.626 m)    Wt 343 lb 4.1 oz (155.7 kg)    SpO2 100%    BMI 58.92 kg/m2       Temp (24hrs), Av.2 °F (37.3 °C), Min:98.3 °F (36.8 °C), Max:99.7 °F (37.6 °C)      Physical Exam:    Gen- Alert in NAD  Lungs- CTA  H-RRR  Abd- S/appropriately tender  Wound changed already by nursing with enteric content. Data Review: images and reports reviewed    Lab Review: All lab results for the last 24 hours reviewed.   Recent Results (from the past 24 hour(s))   GLUCOSE, POC    Collection Time: 18 12:11 PM   Result Value Ref Range    Glucose (POC) 190 (H) 65 - 100 mg/dL    Performed by Brooke Kirby    GLUCOSE, POC    Collection Time: 18  6:05 PM   Result Value Ref Range    Glucose (POC) 215 (H) 65 - 100 mg/dL    Performed by Brooke Kirby    GLUCOSE, POC    Collection Time: 18 11:52 PM   Result Value Ref Range    Glucose (POC) 177 (H) 65 - 100 mg/dL    Performed by MARS GILBERT    METABOLIC PANEL, BASIC    Collection Time: 18  2:24 AM   Result Value Ref Range    Sodium 136 136 - 145 mmol/L    Potassium 3.8 3.5 - 5.1 mmol/L    Chloride 103 97 - 108 mmol/L    CO2 24 21 - 32 mmol/L    Anion gap 9 5 - 15 mmol/L    Glucose 187 (H) 65 - 100 mg/dL    BUN 17 6 - 20 MG/DL    Creatinine 0.68 0.55 - 1.02 MG/DL    BUN/Creatinine ratio 25 (H) 12 - 20      GFR est AA >60 >60 ml/min/1.73m2    GFR est non-AA >60 >60 ml/min/1.73m2    Calcium 8.2 (L) 8.5 - 10.1 MG/DL   MAGNESIUM    Collection Time: 03/18/18  2:24 AM   Result Value Ref Range    Magnesium 1.5 (L) 1.6 - 2.4 mg/dL   PHOSPHORUS    Collection Time: 03/18/18  2:24 AM   Result Value Ref Range    Phosphorus 3.8 2.6 - 4.7 MG/DL   GLUCOSE, POC    Collection Time: 03/18/18  5:46 AM   Result Value Ref Range    Glucose (POC) 193 (H) 65 - 100 mg/dL    Performed by Kendy JOLLEY        Assessment:     Hospital Problems  Date Reviewed: 10/27/2017          Codes Class Noted POA    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     Signed By: Ca Davis MD     March 18, 2018

## 2018-03-18 NOTE — PROGRESS NOTES
03/18/18 0722   Vitals   Temp 98.3 °F (36.8 °C)   Temp Source Oral   Pulse (Heart Rate) (!) 128   Heart Rate Source Monitor   Resp Rate 20   O2 Sat (%) 100 %   Level of Consciousness Alert   BP 91/48   MAP (Calculated) (!) 62   BP 1 Location Left arm   BP 1 Method Automatic   BP Patient Position At rest   MEWS Score 4     Patient is baseline tachycardic. Will continue to monitor closely. Will recheck BP in 1 hour. Patient is asymptomatic.

## 2018-03-18 NOTE — PROGRESS NOTES
03/18/18 1157   Vitals   Temp 98.6 °F (37 °C)   Temp Source Oral   Pulse (Heart Rate) (!) 122   Heart Rate Source Monitor   Resp Rate 20   O2 Sat (%) 98 %   Level of Consciousness Alert   BP 99/51   MAP (Calculated) 67   BP 1 Location Left arm   BP 1 Method Automatic   BP Patient Position At rest   MEWS Score 4   Pt is baseline tachycardic. Will continue to monitor.

## 2018-03-18 NOTE — PROGRESS NOTES
Bedside shift change report given to April, MAURI (oncoming nurse) by Consuelo Acosta RN (offgoing nurse). Report included the following information SBAR, OR Summary, Intake/Output, MAR, Recent Results and Cardiac Rhythm Sinus Tachycardia 120s-130s. Jomar New

## 2018-03-18 NOTE — PROGRESS NOTES
03/18/18 0400   Vitals   Temp 99 °F (37.2 °C)   Temp Source Oral   Pulse (Heart Rate) (!) 121   Heart Rate Source Monitor   Resp Rate 18   O2 Sat (%) 97 %   Level of Consciousness Alert   /53   MAP (Calculated) 69   BP 1 Location Left arm   BP 1 Method Automatic   BP Patient Position At rest   MEWS Score 4     Patient is baseline tachycardic. No complaints from pt at this time. Temps have been in the 99's. Labs sent off. Last WBC was within normal limits.

## 2018-03-18 NOTE — PROGRESS NOTES
Problem: Pressure Injury - Risk of  Goal: *Prevention of pressure ulcer   03/18/18 0116   Wound Prevention and Protection Methods   Orientation of Wound Prevention Posterior   Location of Wound Prevention Heel;Sacrum/Coccyx   Dressing Present  No   Read Only, Retired: Wound Treatment (non-mechanical)   Wound Offloading (Prevention Methods)   Bed, pressure reduction mattress;Bed, pressure redistribution/air; Turning;Pillows;Repositioning     Patient refuses to turn entire body. RN provided education on the importance of moving around in the bed to prevent the formation of a pressure ulcer. Patient agrees to have legs elevated/repositioned.

## 2018-03-19 LAB
ANION GAP SERPL CALC-SCNC: 9 MMOL/L (ref 5–15)
BASOPHILS # BLD: 0 K/UL (ref 0–0.1)
BASOPHILS NFR BLD: 0 % (ref 0–1)
BUN SERPL-MCNC: 17 MG/DL (ref 6–20)
BUN/CREAT SERPL: 27 (ref 12–20)
CALCIUM SERPL-MCNC: 8 MG/DL (ref 8.5–10.1)
CHLORIDE SERPL-SCNC: 105 MMOL/L (ref 97–108)
CO2 SERPL-SCNC: 23 MMOL/L (ref 21–32)
CREAT SERPL-MCNC: 0.64 MG/DL (ref 0.55–1.02)
DIFFERENTIAL METHOD BLD: ABNORMAL
EOSINOPHIL # BLD: 0.1 K/UL (ref 0–0.4)
EOSINOPHIL NFR BLD: 1 % (ref 0–7)
ERYTHROCYTE [DISTWIDTH] IN BLOOD BY AUTOMATED COUNT: 18.1 % (ref 11.5–14.5)
GLUCOSE BLD STRIP.AUTO-MCNC: 184 MG/DL (ref 65–100)
GLUCOSE BLD STRIP.AUTO-MCNC: 202 MG/DL (ref 65–100)
GLUCOSE BLD STRIP.AUTO-MCNC: 202 MG/DL (ref 65–100)
GLUCOSE BLD STRIP.AUTO-MCNC: 206 MG/DL (ref 65–100)
GLUCOSE SERPL-MCNC: 174 MG/DL (ref 65–100)
HCT VFR BLD AUTO: 24.4 % (ref 35–47)
HGB BLD-MCNC: 8 G/DL (ref 11.5–16)
IMM GRANULOCYTES # BLD: 0 K/UL
IMM GRANULOCYTES NFR BLD AUTO: 0 %
LYMPHOCYTES # BLD: 1.6 K/UL (ref 0.8–3.5)
LYMPHOCYTES NFR BLD: 19 % (ref 12–49)
MCH RBC QN AUTO: 30.9 PG (ref 26–34)
MCHC RBC AUTO-ENTMCNC: 32.8 G/DL (ref 30–36.5)
MCV RBC AUTO: 94.2 FL (ref 80–99)
METAMYELOCYTES NFR BLD MANUAL: 3 %
MONOCYTES # BLD: 0.5 K/UL (ref 0–1)
MONOCYTES NFR BLD: 6 % (ref 5–13)
MYELOCYTES NFR BLD MANUAL: 2 %
NEUTS BAND NFR BLD MANUAL: 18 % (ref 0–6)
NEUTS SEG # BLD: 5.8 K/UL (ref 1.8–8)
NEUTS SEG NFR BLD: 49 % (ref 32–75)
NRBC # BLD: 0.05 K/UL (ref 0–0.01)
NRBC BLD-RTO: 0.6 PER 100 WBC
PATH REV BLD -IMP: ABNORMAL
PLATELET # BLD AUTO: 105 K/UL (ref 150–400)
PLATELET COMMENTS,PCOM: ABNORMAL
PMV BLD AUTO: 11.7 FL (ref 8.9–12.9)
POTASSIUM SERPL-SCNC: 3.8 MMOL/L (ref 3.5–5.1)
PROMYELOCYTES NFR BLD MANUAL: 2 %
RBC # BLD AUTO: 2.59 M/UL (ref 3.8–5.2)
RBC MORPH BLD: ABNORMAL
SERVICE CMNT-IMP: ABNORMAL
SODIUM SERPL-SCNC: 137 MMOL/L (ref 136–145)
WBC # BLD AUTO: 8.6 K/UL (ref 3.6–11)
WBC MORPH BLD: ABNORMAL

## 2018-03-19 PROCEDURE — 97530 THERAPEUTIC ACTIVITIES: CPT

## 2018-03-19 PROCEDURE — 74011000258 HC RX REV CODE- 258: Performed by: SURGERY

## 2018-03-19 PROCEDURE — 65660000000 HC RM CCU STEPDOWN

## 2018-03-19 PROCEDURE — 74011000250 HC RX REV CODE- 250: Performed by: SURGERY

## 2018-03-19 PROCEDURE — 3331090001 HH PPS REVENUE CREDIT

## 2018-03-19 PROCEDURE — 74011250636 HC RX REV CODE- 250/636: Performed by: EMERGENCY MEDICINE

## 2018-03-19 PROCEDURE — 74011250636 HC RX REV CODE- 250/636: Performed by: NURSE PRACTITIONER

## 2018-03-19 PROCEDURE — 80048 BASIC METABOLIC PNL TOTAL CA: CPT | Performed by: SURGERY

## 2018-03-19 PROCEDURE — 74011250636 HC RX REV CODE- 250/636: Performed by: SURGERY

## 2018-03-19 PROCEDURE — 3331090002 HH PPS REVENUE DEBIT

## 2018-03-19 PROCEDURE — 77030018836 HC SOL IRR NACL ICUM -A

## 2018-03-19 PROCEDURE — 74011636637 HC RX REV CODE- 636/637: Performed by: SURGERY

## 2018-03-19 PROCEDURE — 36415 COLL VENOUS BLD VENIPUNCTURE: CPT | Performed by: SURGERY

## 2018-03-19 PROCEDURE — 85025 COMPLETE CBC W/AUTO DIFF WBC: CPT | Performed by: SURGERY

## 2018-03-19 PROCEDURE — 97110 THERAPEUTIC EXERCISES: CPT

## 2018-03-19 PROCEDURE — 82962 GLUCOSE BLOOD TEST: CPT

## 2018-03-19 PROCEDURE — 77030019563 HC DEV ATTCH FEED HOLL -A

## 2018-03-19 PROCEDURE — 74011250636 HC RX REV CODE- 250/636: Performed by: PHYSICAL MEDICINE & REHABILITATION

## 2018-03-19 RX ORDER — ACETAMINOPHEN 325 MG/1
650 TABLET ORAL
Status: DISCONTINUED | OUTPATIENT
Start: 2018-03-19 | End: 2018-05-23 | Stop reason: HOSPADM

## 2018-03-19 RX ORDER — DIPHENHYDRAMINE HCL 25 MG
25 CAPSULE ORAL
Status: DISCONTINUED | OUTPATIENT
Start: 2018-03-19 | End: 2018-05-23 | Stop reason: HOSPADM

## 2018-03-19 RX ADMIN — ACETAMINOPHEN 1000 MG: 10 INJECTION, SOLUTION INTRAVENOUS at 03:45

## 2018-03-19 RX ADMIN — HYDROMORPHONE HYDROCHLORIDE 1.5 MG: 2 INJECTION INTRAMUSCULAR; INTRAVENOUS; SUBCUTANEOUS at 13:21

## 2018-03-19 RX ADMIN — FAMOTIDINE: 10 INJECTION, SOLUTION INTRAVENOUS at 19:13

## 2018-03-19 RX ADMIN — INSULIN LISPRO 4 UNITS: 100 INJECTION, SOLUTION INTRAVENOUS; SUBCUTANEOUS at 00:56

## 2018-03-19 RX ADMIN — Medication: at 02:01

## 2018-03-19 RX ADMIN — PROCHLORPERAZINE EDISYLATE 5 MG: 5 INJECTION INTRAMUSCULAR; INTRAVENOUS at 02:53

## 2018-03-19 RX ADMIN — Medication 10 ML: at 21:31

## 2018-03-19 RX ADMIN — I.V. FAT EMULSION 250 ML: 20 EMULSION INTRAVENOUS at 19:13

## 2018-03-19 RX ADMIN — OCTREOTIDE ACETATE 50 MCG: 50 INJECTION, SOLUTION INTRAVENOUS; SUBCUTANEOUS at 16:01

## 2018-03-19 RX ADMIN — Medication 10 ML: at 21:41

## 2018-03-19 RX ADMIN — SODIUM CHLORIDE, SODIUM LACTATE, POTASSIUM CHLORIDE, AND CALCIUM CHLORIDE 75 ML/HR: 600; 310; 30; 20 INJECTION, SOLUTION INTRAVENOUS at 02:56

## 2018-03-19 RX ADMIN — Medication: at 16:01

## 2018-03-19 RX ADMIN — HYDROMORPHONE HYDROCHLORIDE 1.5 MG: 2 INJECTION INTRAMUSCULAR; INTRAVENOUS; SUBCUTANEOUS at 21:41

## 2018-03-19 RX ADMIN — Medication 10 ML: at 10:45

## 2018-03-19 RX ADMIN — Medication: at 22:14

## 2018-03-19 RX ADMIN — LORAZEPAM 1 MG: 2 INJECTION INTRAMUSCULAR; INTRAVENOUS at 11:29

## 2018-03-19 RX ADMIN — HYDROMORPHONE HYDROCHLORIDE 1.5 MG: 2 INJECTION INTRAMUSCULAR; INTRAVENOUS; SUBCUTANEOUS at 18:27

## 2018-03-19 RX ADMIN — PIPERACILLIN AND TAZOBACTAM 3.38 G: 3; .375 INJECTION, POWDER, FOR SOLUTION INTRAVENOUS at 05:41

## 2018-03-19 RX ADMIN — NYSTATIN: 100000 CREAM TOPICAL at 09:00

## 2018-03-19 RX ADMIN — Medication 10 ML: at 05:41

## 2018-03-19 RX ADMIN — PIPERACILLIN AND TAZOBACTAM 3.38 G: 3; .375 INJECTION, POWDER, FOR SOLUTION INTRAVENOUS at 21:28

## 2018-03-19 RX ADMIN — OCTREOTIDE ACETATE 50 MCG: 50 INJECTION, SOLUTION INTRAVENOUS; SUBCUTANEOUS at 10:44

## 2018-03-19 RX ADMIN — INSULIN LISPRO 4 UNITS: 100 INJECTION, SOLUTION INTRAVENOUS; SUBCUTANEOUS at 18:17

## 2018-03-19 RX ADMIN — NYSTATIN: 100000 CREAM TOPICAL at 18:18

## 2018-03-19 RX ADMIN — DAKIN'S SOLUTION 0.125% (QUARTER STRENGTH): 0.12 SOLUTION at 09:00

## 2018-03-19 RX ADMIN — INSULIN LISPRO 3 UNITS: 100 INJECTION, SOLUTION INTRAVENOUS; SUBCUTANEOUS at 11:28

## 2018-03-19 RX ADMIN — INSULIN LISPRO 4 UNITS: 100 INJECTION, SOLUTION INTRAVENOUS; SUBCUTANEOUS at 05:50

## 2018-03-19 RX ADMIN — ENOXAPARIN SODIUM 40 MG: 100 INJECTION SUBCUTANEOUS at 09:59

## 2018-03-19 RX ADMIN — Medication: at 10:07

## 2018-03-19 RX ADMIN — PIPERACILLIN AND TAZOBACTAM 3.38 G: 3; .375 INJECTION, POWDER, FOR SOLUTION INTRAVENOUS at 13:21

## 2018-03-19 RX ADMIN — HYDROMORPHONE HYDROCHLORIDE 1.5 MG: 2 INJECTION INTRAMUSCULAR; INTRAVENOUS; SUBCUTANEOUS at 01:47

## 2018-03-19 NOTE — PROGRESS NOTES
Ms. Luis Alberto Casper reports discomfort from NG tube. Tm 100.2 Tc 97.7 HR: 120 BP: 105/67 Resp Rate: 21 96% sat on room air. Intake/Output Summary (Last 24 hours) at 03/19/18 0857  Last data filed at 03/19/18 0700   Gross per 24 hour   Intake           466.25 ml   Output             1525 ml   Net         -1058.75 ml   Exam: Cor: RRR. Lungs: Bilateral breath sounds. Clear to auscultation. Abd: Soft. Tender. Enteric drainage on dressing. Ileostomy working. Labs:   Recent Results (from the past 12 hour(s))   GLUCOSE, POC    Collection Time: 03/19/18 12:28 AM   Result Value Ref Range    Glucose (POC) 202 (H) 65 - 100 mg/dL    Performed by Corie Saldaña    CBC WITH AUTOMATED DIFF    Collection Time: 03/19/18  3:34 AM   Result Value Ref Range    WBC 8.6 3.6 - 11.0 K/uL    RBC 2.59 (L) 3.80 - 5.20 M/uL    HGB 8.0 (L) 11.5 - 16.0 g/dL    HCT 24.4 (L) 35.0 - 47.0 %    MCV 94.2 80.0 - 99.0 FL    MCH 30.9 26.0 - 34.0 PG    MCHC 32.8 30.0 - 36.5 g/dL    RDW 18.1 (H) 11.5 - 14.5 %    PLATELET 544 (L) 697 - 400 K/uL    MPV 11.7 8.9 - 12.9 FL    NRBC 0.6 (H) 0  WBC    ABSOLUTE NRBC 0.05 (H) 0.00 - 0.01 K/uL    NEUTROPHILS 49 32 - 75 %    BAND NEUTROPHILS 18 (H) 0 - 6 %    LYMPHOCYTES 19 12 - 49 %    MONOCYTES 6 5 - 13 %    EOSINOPHILS 1 0 - 7 %    BASOPHILS 0 0 - 1 %    METAMYELOCYTES 3 (H) 0 %    MYELOCYTES 2 (H) 0 %    PROMYELOCYTES 2 (H) 0 %    IMMATURE GRANULOCYTES 0 %    ABS. NEUTROPHILS 5.8 1.8 - 8.0 K/UL    ABS. LYMPHOCYTES 1.6 0.8 - 3.5 K/UL    ABS. MONOCYTES 0.5 0.0 - 1.0 K/UL    ABS. EOSINOPHILS 0.1 0.0 - 0.4 K/UL    ABS. BASOPHILS 0.0 0.0 - 0.1 K/UL    ABS. IMM.  GRANS. 0.0 K/UL    DF MANUAL      PLATELET COMMENTS Large Platelets      RBC COMMENTS POLYCHROMASIA  PRESENT        RBC COMMENTS ANISOCYTOSIS  1+        RBC COMMENTS Pathology Review Requested      WBC COMMENTS TOXIC GRANULATION     METABOLIC PANEL, BASIC    Collection Time: 03/19/18  3:34 AM   Result Value Ref Range    Sodium 137 136 - 145 mmol/L    Potassium 3.8 3.5 - 5.1 mmol/L    Chloride 105 97 - 108 mmol/L    CO2 23 21 - 32 mmol/L    Anion gap 9 5 - 15 mmol/L    Glucose 174 (H) 65 - 100 mg/dL    BUN 17 6 - 20 MG/DL    Creatinine 0.64 0.55 - 1.02 MG/DL    BUN/Creatinine ratio 27 (H) 12 - 20      GFR est AA >60 >60 ml/min/1.73m2    GFR est non-AA >60 >60 ml/min/1.73m2    Calcium 8.0 (L) 8.5 - 10.1 MG/DL   GLUCOSE, POC    Collection Time: 03/19/18  5:40 AM   Result Value Ref Range    Glucose (POC) 202 (H) 65 - 100 mg/dL    Performed by Marietta Lennon    Will remove NG tube. NPO except ice chips for now. Renew TPN and lipids. Saline lock IVF. Continue Octreotide. Zosyn as ordered. DVT Prophylaxis - Lovenox. Wound care to see for wound  to control fistula output. PT following.

## 2018-03-19 NOTE — PROGRESS NOTES
0315: patients abdominal dressing was saturated with green drainage and pooling in the wound. Did complete dressing change with Dakins. Checked and re taped the rubber suction tube to the clear tubing to make sure it was connected tight to suction. Will continue to monitor patient.

## 2018-03-19 NOTE — PROGRESS NOTES
Problem: Falls - Risk of  Goal: *Absence of Falls  Document Marlene Fall Risk and appropriate interventions in the flowsheet.    Outcome: Progressing Towards Goal  Fall Risk Interventions:  Mobility Interventions: Strengthening exercises (ROM-active/passive), PT Consult for mobility concerns         Medication Interventions: Patient to call before getting OOB    Elimination Interventions: Bed/chair exit alarm    History of Falls Interventions: Consult care management for discharge planning

## 2018-03-19 NOTE — PROGRESS NOTES
Patient discussed during IDR today. Per Dr. Deejay Castañeda, Open wound with enteric appearing drainage. The fascia has dehisced and suture material removed. To continue local wound care including red rubber catheter to control/quantify fistula output. Continue TPN/Lipids and Octreotide.  PCA for pain management, PICC line, ostomy active and  encourage patient to work with Js Rodriguez RN, CRM

## 2018-03-19 NOTE — PROGRESS NOTES
No complaints this PM.  Tm 100.2 Tc 98.9 HR: 129 BP: 118/49 Resp Rate: 21 per minute 98% sat on room air. Abdomen - Soft. Open wound with enteric appearing drainage. The fascia has dehisced. Suture material removed. Would continue local wound care including red rubber catheter to control/quantify fistula output. Continue TPN/Lipids. Continue octreotide. PT following.

## 2018-03-19 NOTE — PROGRESS NOTES
Bedside shift change report given to Regina Shah RN (oncoming nurse) by April, RN (offgoing nurse). Report included the following information SBAR, Kardex, Intake/Output, MAR, Accordion and Cardiac Rhythm Sinus tach.

## 2018-03-19 NOTE — PROGRESS NOTES
Problem: Pressure Injury - Risk of  Goal: *Prevention of pressure ulcer  Outcome: Progressing Towards Goal  Patient transferred to chair, able to sit for 1 hour, without distress

## 2018-03-19 NOTE — PROGRESS NOTES
Problem: Mobility Impaired (Adult and Pediatric)  Goal: *Acute Goals and Plan of Care (Insert Text)  Physical Therapy Goals  Revisited 3/19/2018, goals remain appropriate, carry over    Physical Therapy Goals  Initiated 3/8/2018  1. Patient will move from supine to sit and sit to supine  in bed with moderate assistance  within 7 day(s). 2.  Patient will transfer from bed to chair and chair to bed with moderate assistance  using the least restrictive device within 7 day(s). 3.  Patient will perform sit to stand with moderate assistance  within 7 day(s). 4.  PT will assess gait with the least restrictive device within 7 day(s). physical Therapy TREATMENT: WEEKLY REASSESSMENT  Patient: Latonia Kirkpatrick (38 y.o. female)  Date: 3/19/2018  Diagnosis: FISTULA  Small bowel fistula <principal problem not specified>  Procedure(s) (LRB):  EXPLORATORY LAPAROTOMY, LYSIS OF ADHESION X 5HOURS, SMALL BOWEL FISTULA TAKE DOWN X 2 AND WOUND VAC PLACEMENT (N/A) 12 Days Post-Op  Precautions:  abdominal wound with drainage to suction, pure wick, contact, PICC (?) R  Chart, physical therapy assessment, plan of care and goals were reviewed. ASSESSMENT:  Pt seen this morning with assist from RN and PCT for OOB to chair per MD urging. Pt agreeable with encouragement and anxiety medication prior to dependent transfer with transfer board to stretcher chair. Pt completed supine therex in bed while awaiting RN and PCT arrival.  Pt with improved SBP post transfer though DBP dropping to 48. HR remains tachy though stable despite transition. Pt to be assisted by wound care following return to bed 2* increased drainage from abdominal wound. Pt remains appropriate for D/C to rehab once medically stable. Discussed OOB to stretcher chair with mobility team when they round from here out, RN receptive. PT will try to progress transfers functionally and gait training from this point.   Patient's progression toward goals since last assessment: fair-limited     PLAN:  Goals have been updated based on progression since last assessment. Patient continues to benefit from skilled intervention to address the above impairments. Continue to follow the patient 5 times a week to address goals. Planned Interventions:  [x]              Bed Mobility Training             []       Neuromuscular Re-Education  [x]              Transfer Training                   []       Orthotic/Prosthetic Training  []              Gait Training                         []       Modalities  [x]              Therapeutic Exercises           []       Edema Management/Control  [x]              Therapeutic Activities            [x]       Patient and Family Training/Education  []              Other (comment):  Discharge Recommendations: Inpatient Rehab  Further Equipment Recommendations for Discharge: TBD     SUBJECTIVE:   Patient stated I am so nervous. .. Yes. ...  Pt states she normally takes anxiety medication when asked about perceived anxiousness. OBJECTIVE DATA SUMMARY:   Critical Behavior:  Neurologic State: Alert  Orientation Level: Oriented X4  Cognition: Follows commands  Functional Mobility Training:  Bed Mobility:  Rolling: Maximum assistance; Additional time;Assist x2; pt able to roll bilaterally with cues and assist x2  Supine to Sit: Total assistance; Additional time;Assist x1;Assist x2 (pt to chair with dependent slide)  Transfers:   Dependent slide from bed to stretcher chair on left; assist x3  Balance:  Sitting: Intact; With support  Standing:  (NT)  Therapeutic Exercises:   Supine: heel slides, quad sets, APs x10 reps  Pain:   Pt c/o pain and PCA used though no # provided this session  Activity Tolerance:   VSS (though BP remains low and HR high) and RN present for session  After treatment:   [x]  Patient left in no apparent distress sitting up in chair  []  Patient left in no apparent distress in bed  [x]  Call bell left within reach  [x]  Nursing present and agreeable to transfer back to bed without PT  []  Caregiver present  []  Bed alarm activated    COMMUNICATION/COLLABORATION:   The patients plan of care was discussed with: Registered Nurse    Radha Kowalski   Time Calculation: 44 mins

## 2018-03-19 NOTE — PROGRESS NOTES
03/18/18 2034   Vitals   Temp 99.2 °F (37.3 °C)   Temp Source Oral   Pulse (Heart Rate) (!) 118   Heart Rate Source Monitor   Resp Rate 20   O2 Sat (%) 99 %   Level of Consciousness Alert   /84   MAP (Calculated) 97   BP 1 Location Left arm   BP 1 Method Automatic   BP Patient Position At rest   MEWS Score 3   continue to monitor. HR baseline for pt.  bp improved. Spoke with patient- she has no idea when she concieved because she is still on mini pill and her baby is 10 months.  Pt had a \"wierd\" taste in her mouth, which is the same thing she had with her previous pregnancies.     Pt would like to start with an U/S and then schedule an appointment after that is completed. Order placed and OB called to schedule

## 2018-03-19 NOTE — PROGRESS NOTES
Palliative Medicine Consult  Heath: [phone] ([phone]    Patient Name: Maria De Jesus Hensley  YOB: 1975    Date of Initial Consult: 3/12/18  Reason for Consult: Pain management, chronic and post op   Requesting Provider: Rajni   Primary Care Physician: Anibal Bailey MD     SUMMARY:   Maria De Jesus Hensley is a 38 y.o. with a past history of Crohn's disease (followed by Dr Carl Zuniga), mult surgeries s/p total colectomy w/ end ileostomy, 6/2017 ex lap with small bowel perforation, 6/28/17 ex lap, LPA, repair or enterotomy, SMA stent on L, 7/16/2917 ex lap, KATHRINE, fistula exclusion w/ feeding tube placement and prolonged TPN who was admitted on 3/7/2018 from home for planned surgery, s/p ex lap w/ extensive KATHRINE and small bowel fistula take down, wound vac placement. CT abd/pelvis 3/17 showing incr anterior wall dehiscence and enterocutaneous . Known to our team during past hospitalizations. Has had lengthy hospital stays w/ mult complications. Has required [facility] stays in past. Has chronic pain from Crohn's disease (and has not been able to tolerate home Crohn's meds recently due to acute issues- had been on steroids, stopped prior to surgery). Discussion of Remicaide in future. Current medical issues leading to Palliative Medicine involvement include: pain management. Patient's mother, Vikki Melchor is NOK.  reviewed, no abberrancies- one prescriber for opioids and one pharmacy. Most recently filled 3/9/18: MSContin 100mg tid and Morphine IR 30mg- 8 tabs a day. Has been tried on equivalent dose of Fentanyl patch w/out same benefit. PALLIATIVE DIAGNOSES:   1. Acute post operative pain in abdomen  2. Chronic abdominal pain from Cronh's disease and hx of surgeries  3. Nausea  4. Anxiety   5. Feeding difficulties due to medical condition- has been on TPN for several months  6. Edema   7. L ankle pain s/p fall, normal XRs  8. Grief/depressive sx   9. Constipation      PLAN:     1.  PCA use is mostly 1-2 doses / hr in last 24h, using prn for wound care and working w/ therapies, mom and pt deny untoward effects of dilaudid such as confusion / sedation, continue close assessment  2. Maintain current Dilaudid PCA 0.6mg/h and 0.5mg IV every 6 min  3. Dose 1.5mg IV dilaudid now, then 1.5mg IV dilaudid every 3h PRN for pain not controlled with PCA  4. Will change back to MSContin once can tolerate sips/chips/po meds. Pt eager to start again, but concern that NGT just removed. 5. Abd pain from surgical incision and wound care as well as underlying chronic pain from Crohn's which is worse when off her steroids. 6. Encourage pt to work w/ therapies- goal is to get to chair today. 7. Following to assist w/ transition to po and for emotional support as pt needs. 8. Bowel regimen, anti-emetics per surgery. 9. Communicated plan of care with: Palliative IDT; bedside RN; Angela Galo PT       GOALS OF CARE / TREATMENT PREFERENCES:     GOALS OF CARE:  Patient/Health Care Proxy Stated Goals:  (Sx management )      TREATMENT PREFERENCES:   Code Status: Full Code    Advance Care Planning:  Advance Care Planning 3/15/2018   Patient's Healthcare Decision Maker is: Named in scanned ACP document   Primary Decision Maker Name Frances Biggs   Primary Decision Maker Phone Number [phone]   Primary Decision Maker Relationship to Patient Parent   Secondary Decision Maker Name Agustin Hodgkins   Secondary Decision Maker Phone Number [phone]   Secondary Decision Maker Relationship to Patient Unknown   Confirm Advance Directive Yes, on file   Patient Would Like to Complete Advance Directive -   Does the patient have other document types -       Medical Interventions: Full interventions           Other:    As far as possible, the palliative care team has discussed with patient / health care proxy about goals of care / treatment preferences for patient.            HISTORY:     Reviewed vitals, I/O's, labs, imaging, MAR, notes- had CT abd/pelvis over weekend. MAR includes   Marinol, missing doses  Dilaudid 2mg IV every 3h PRN, 4 doses in past 24h  Dilaudid pca, 0.6mg/hr basal, 0.5mg every 6min pca dose- approx 14mg in past 8h  Ativan 1mg IV prn   Zofran  Compazine  Phenergan  Scopolamine patch        HPI/SUBJECTIVE:    The patient is:   [x] Verbal and participatory  [] Non-participatory due to:     Pain about 7/10, going to work w/ therapies now and then have wound care. Anxious about doing both in one morning, but encourage her. +ostomy output, NGT removed.     Clinical Pain Assessment (nonverbal scale for severity on nonverbal patients):   Clinical Pain Assessment  Severity: 7  Location: Abdomen   Character: Sharp and aching   Duration: chronic and since surgery   Effect: harder to move   Factors: worse w/ movement and wound care  Frequency: constant          Duration: for how long has pt been experiencing pain (e.g., 2 days, 1 month, years)  Frequency: how often pain is an issue (e.g., several times per day, once every few days, constant)     FUNCTIONAL ASSESSMENT:     Palliative Performance Scale (PPS):  PPS: 60       PSYCHOSOCIAL/SPIRITUAL SCREENING:     Palliative IDT has assessed this patient for cultural preferences / practices and a referral made as appropriate to needs (Cultural Services, Patient Advocacy, Ethics, etc.)    Advance Care Planning:  Advance Care Planning 3/15/2018   Patient's Healthcare Decision Maker is: Named in scanned ACP document   Primary Decision Maker Name Santos Trevizo   Primary Decision Maker Phone Number [phone]   Primary Decision Maker Relationship to Patient Parent   Secondary Decision Maker Name Canelo Valle   Secondary Decision Maker Phone Number [phone]   Secondary Decision Maker Relationship to Patient Unknown   Confirm Advance Directive Yes, on file   Patient Would Like to Complete Advance Directive -   Does the patient have other document types -       Any spiritual / Worship concerns:  [] Yes /  [x] No    Caregiver Burnout:  [] Yes /  [x] No /  [] No Caregiver Present      Anticipatory grief assessment:   [x] Normal  / [] Maladaptive       ESAS Anxiety: Anxiety: 3    ESAS Depression: Depression: 2        REVIEW OF SYSTEMS:     Positive and pertinent negative findings in ROS are noted above in HPI. The following systems were [x] reviewed / [] unable to be reviewed as noted in HPI  Other findings are noted below. Systems: constitutional, ears/nose/mouth/throat, respiratory, gastrointestinal, genitourinary, musculoskeletal, integumentary, neurologic, psychiatric, endocrine. Positive findings noted below. Modified ESAS Completed by: provider   Fatigue: 3 Drowsiness: 0   Depression: 2 Pain: 7   Anxiety: 3 Nausea: 1   Anorexia: 8 Dyspnea: 0     Constipation: Yes              PHYSICAL EXAM:     From RN flowsheet:  Wt Readings from Last 3 Encounters:   03/19/18 343 lb 14.7 oz (156 kg)   03/05/18 304 lb 3.2 oz (138 kg)   02/28/18 306 lb (138.8 kg)     Blood pressure 92/63, pulse (!) 123, temperature 98.9 °F (37.2 °C), resp. rate 21, height 5' 4\" (1.626 m), weight 343 lb 14.7 oz (156 kg), SpO2 98 %.     Pain Scale 1: Numeric (0 - 10)  Pain Intensity 1: 7  Pain Onset 1: chronic  Pain Location 1: Abdomen  Pain Orientation 1: Anterior  Pain Description 1: Aching  Pain Intervention(s) 1: Encouraged PCA  Last bowel movement, if known: stool output in ostomy     Constitutional: awake, alert, oriented, no sedation or confusion  Eyes: pupils equal, anicteric  ENMT: no nasal discharge, moist mucous membranes  Cardiovascular: regular rhythm, tachycardic at baseline  Respiratory: breathing not labored, symmetric  Gastrointestinal: soft , TTP, hypoactive bowel sounds, ostomy, midline incisions bandaged   Musculoskeletal: no deformity  Skin: warm, dry, pale  Neurologic: following commands, moving all extremities  Psychiatric: full affect, no hallucinations         HISTORY:     Active Problems:    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 [address] 8/15/2011    DVT (deep venous thrombosis) ([facility] [address] 8/15/2011    LEFT LEG    Edema     GENERALIZED R/T TPN; WAIST DOWN    Incarcerated ventral hernia 8/15/2011    Lupus     Lyme disease     Psychiatric disorder     ANXIETY    Right flank pain 8/22/2011    Seizures ([facility] [address] 1990    LAST AT AGE 17    Stroke [facility]) 1990    age 17; A WEEK POST OP, HAD SEIZURES AND STROKE, WAS IN COMA FOR A MONTH    Thyroid disease     HYPO-NO MEDS      Past Surgical History:   Procedure Laterality Date    HX APPENDECTOMY      HX GYN  2015    HIDRADENTIS LABIA    HX OTHER SURGICAL      multiple procedures related to her Crohn's disease    HX OTHER SURGICAL      ABDOMINAL SURGERY REMOVING COLON, 2/3 STOMACH, 1/3 SMALL INTESTINE    SC LAP, INCISIONAL HERNIA REPAIR,INCARCERATED  9-10-08    dr. Ysabel Livingston      Family History   Problem Relation Age of Onset    Hypertension Father     Stroke Father     Other Father      arthritis    Arthritis-osteo Father     Hypertension Mother     Arthritis-osteo Mother     Anesth Problems Neg Hx       History reviewed, no pertinent family history.   Social History   Substance Use Topics    Smoking status: Former Smoker     Packs/day: 1.00     Years: 16.00     Quit date: 2/27/2017    Smokeless tobacco: Former User      Comment: OFF AND ON    Alcohol use No     Allergies   Allergen Reactions    Sulfa (Sulfonamide Antibiotics) Anaphylaxis    Demerol [Meperidine] Rash    Soma [Carisoprodol] Rash and Nausea Only    Toradol [Ketorolac Tromethamine] Nausea and Vomiting      Current Facility-Administered Medications   Medication Dose Route Frequency    TPN ADULT - CENTRAL   IntraVENous CONTINUOUS    acetaminophen (TYLENOL) tablet 650 mg  650 mg Oral Q6H PRN    diphenhydrAMINE (BENADRYL) capsule 25 mg  25 mg Oral Q6H PRN    TPN ADULT - CENTRAL   IntraVENous CONTINUOUS    enoxaparin (LOVENOX) injection 40 mg  40 mg SubCUTAneous Q24H    piperacillin-tazobactam (ZOSYN) 3.375 g in 0.9% sodium chloride (MBP/ADV) 100 mL  3.375 g IntraVENous Q8H    octreotide (SANDOSTATIN) injection 50 mcg  50 mcg IntraVENous TID    fat emulsion 20% (LIPOSYN, INTRAlipid) infusion 250 mL  250 mL IntraVENous Q MON, WED & FRI    HYDROmorphone (DILAUDID) injection 1.5 mg  1.5 mg IntraVENous Q3H PRN    0.9% sodium chloride infusion 250 mL  250 mL IntraVENous PRN    0.9% sodium chloride infusion 250 mL  250 mL IntraVENous PRN    sodium hypochlorite (QUARTER STRENGTH DAKIN'S) 0.125% irrigation (bottle)   Topical DAILY    nystatin (MYCOSTATIN) 100,000 unit/gram cream   Topical BID    prochlorperazine (COMPAZINE) with saline injection 5 mg  5 mg IntraVENous Q6H PRN    naloxone (NARCAN) injection 0.4 mg  0.4 mg IntraVENous EVERY 2 MINUTES AS NEEDED    0.9% sodium chloride infusion 250 mL  250 mL IntraVENous PRN    glucose chewable tablet 16 g  4 Tab Oral PRN    dextrose (D50W) injection syrg 12.5-25 g  12.5-25 g IntraVENous PRN    glucagon (GLUCAGEN) injection 1 mg  1 mg IntraMUSCular PRN    insulin lispro (HUMALOG) injection   SubCUTAneous Q6H    scopolamine (TRANSDERM-SCOP) 1 mg over 3 days 1 Patch  1 Patch TransDERmal Q72H    sodium chloride (NS) flush 20 mL  20 mL InterCATHeter PRN    sodium chloride (NS) flush 10 mL  10 mL InterCATHeter Q24H    sodium chloride (NS) flush 10 mL  10 mL InterCATHeter PRN    sodium chloride (NS) flush 10 mL  10 mL InterCATHeter Q8H    alteplase (CATHFLO) 1 mg in sterile water (preservative free) 1 mL injection  1 mg InterCATHeter PRN    bacitracin 500 unit/gram packet 1 Packet  1 Packet Topical PRN    sodium chloride (NS) flush 20 mL  20 mL InterCATHeter PRN    sodium chloride (NS) flush 10 mL  10 mL InterCATHeter Q24H    sodium chloride (NS) flush 10 mL  10 mL InterCATHeter PRN  sodium chloride (NS) flush 10 mL  10 mL InterCATHeter Q8H    dronabinol (MARINOL) capsule 2.5 mg  2.5 mg Oral BID    HYDROmorphone (PF) 15 mg/30 ml (DILAUDID) PCA   IntraVENous CONTINUOUS    phenol throat spray (CHLORASEPTIC) 1 Spray  1 Spray Oral PRN    ondansetron (ZOFRAN) injection 4 mg  4 mg IntraVENous Q4H PRN    promethazine (PHENERGAN) 12.5 mg in 0.9% sodium chloride 50 mL IVPB  12.5 mg IntraVENous Q6H PRN    LORazepam (ATIVAN) injection 1 mg  1 mg IntraVENous Q6H PRN    albuterol (PROVENTIL VENTOLIN) nebulizer solution 2.5 mg  2.5 mg Nebulization Q4H PRN          LAB AND IMAGING FINDINGS:     Lab Results   Component Value Date/Time    WBC 8.6 03/19/2018 03:34 AM    HGB 8.0 (L) 03/19/2018 03:34 AM    PLATELET 601 (L) 85/88/0898 03:34 AM     Lab Results   Component Value Date/Time    Sodium 137 03/19/2018 03:34 AM    Potassium 3.8 03/19/2018 03:34 AM    Chloride 105 03/19/2018 03:34 AM    CO2 23 03/19/2018 03:34 AM    BUN 17 03/19/2018 03:34 AM    Creatinine 0.64 03/19/2018 03:34 AM    Calcium 8.0 (L) 03/19/2018 03:34 AM    Magnesium 1.5 (L) 03/18/2018 02:24 AM    Phosphorus 3.8 03/18/2018 02:24 AM      Lab Results   Component Value Date/Time    AST (SGOT) 7 (L) 03/17/2018 03:11 AM    Alk. phosphatase 77 03/17/2018 03:11 AM    Protein, total 5.1 (L) 03/17/2018 03:11 AM    Albumin 1.6 (L) 03/17/2018 03:11 AM    Globulin 3.5 03/17/2018 03:11 AM     Lab Results   Component Value Date/Time    INR 1.0 12/07/2017 08:37 AM    Prothrombin time 10.0 12/07/2017 08:37 AM      No results found for: IRON, FE, TIBC, IBCT, PSAT, FERR   No results found for: PH, PCO2, PO2  No components found for: GLPOC   No results found for: CPK, CKMB             Total time:   Counseling / coordination time, spent as noted above:   > 50% counseling / coordination?:     Prolonged service was provided for  []30 min   []75 min in face to face time in the presence of the patient, spent as noted above.   Time Start:   Time End:   Note: this can only be billed with 46879 (initial) or 66219 (follow up). If multiple start / stop times, list each separately.

## 2018-03-19 NOTE — PROGRESS NOTES
Problem: Falls - Risk of  Goal: *Absence of Falls  Document Marlene Fall Risk and appropriate interventions in the flowsheet.    Outcome: Progressing Towards Goal  Fall Risk Interventions:  Mobility Interventions: Strengthening exercises (ROM-active/passive), PT Consult for mobility concerns         Medication Interventions: Patient to call before getting OOB    Elimination Interventions: Bed/chair exit alarm    History of Falls Interventions: Consult care management for discharge planning        Problem: Surgical Wound Care  Goal: *Improvement of existing wound and maintenance of skin integrity  Outcome: Progressing Towards Goal  Dressing changed prn

## 2018-03-20 LAB
ANION GAP SERPL CALC-SCNC: 8 MMOL/L (ref 5–15)
BUN SERPL-MCNC: 19 MG/DL (ref 6–20)
BUN/CREAT SERPL: 31 (ref 12–20)
CALCIUM SERPL-MCNC: 8.2 MG/DL (ref 8.5–10.1)
CHLORIDE SERPL-SCNC: 104 MMOL/L (ref 97–108)
CO2 SERPL-SCNC: 25 MMOL/L (ref 21–32)
CREAT SERPL-MCNC: 0.61 MG/DL (ref 0.55–1.02)
ERYTHROCYTE [DISTWIDTH] IN BLOOD BY AUTOMATED COUNT: 18.6 % (ref 11.5–14.5)
GLUCOSE BLD STRIP.AUTO-MCNC: 146 MG/DL (ref 65–100)
GLUCOSE BLD STRIP.AUTO-MCNC: 161 MG/DL (ref 65–100)
GLUCOSE BLD STRIP.AUTO-MCNC: 196 MG/DL (ref 65–100)
GLUCOSE BLD STRIP.AUTO-MCNC: 199 MG/DL (ref 65–100)
GLUCOSE BLD STRIP.AUTO-MCNC: 213 MG/DL (ref 65–100)
GLUCOSE SERPL-MCNC: 185 MG/DL (ref 65–100)
HCT VFR BLD AUTO: 24 % (ref 35–47)
HGB BLD-MCNC: 7.3 G/DL (ref 11.5–16)
MCH RBC QN AUTO: 30.8 PG (ref 26–34)
MCHC RBC AUTO-ENTMCNC: 30.4 G/DL (ref 30–36.5)
MCV RBC AUTO: 101.3 FL (ref 80–99)
NRBC # BLD: 0.05 K/UL (ref 0–0.01)
NRBC BLD-RTO: 0.5 PER 100 WBC
PLATELET # BLD AUTO: 137 K/UL (ref 150–400)
PMV BLD AUTO: 11.4 FL (ref 8.9–12.9)
POTASSIUM SERPL-SCNC: 3.9 MMOL/L (ref 3.5–5.1)
RBC # BLD AUTO: 2.37 M/UL (ref 3.8–5.2)
SERVICE CMNT-IMP: ABNORMAL
SODIUM SERPL-SCNC: 137 MMOL/L (ref 136–145)
WBC # BLD AUTO: 10.4 K/UL (ref 3.6–11)

## 2018-03-20 PROCEDURE — 74011636637 HC RX REV CODE- 636/637: Performed by: SURGERY

## 2018-03-20 PROCEDURE — 65660000000 HC RM CCU STEPDOWN

## 2018-03-20 PROCEDURE — 74011250636 HC RX REV CODE- 250/636: Performed by: PHYSICAL MEDICINE & REHABILITATION

## 2018-03-20 PROCEDURE — 74011250636 HC RX REV CODE- 250/636: Performed by: SURGERY

## 2018-03-20 PROCEDURE — 74011250636 HC RX REV CODE- 250/636: Performed by: NURSE PRACTITIONER

## 2018-03-20 PROCEDURE — 74011250637 HC RX REV CODE- 250/637: Performed by: PHYSICIAN ASSISTANT

## 2018-03-20 PROCEDURE — 80048 BASIC METABOLIC PNL TOTAL CA: CPT | Performed by: NURSE PRACTITIONER

## 2018-03-20 PROCEDURE — 77030018836 HC SOL IRR NACL ICUM -A

## 2018-03-20 PROCEDURE — 3331090002 HH PPS REVENUE DEBIT

## 2018-03-20 PROCEDURE — 77030011943

## 2018-03-20 PROCEDURE — 74011000250 HC RX REV CODE- 250: Performed by: SURGERY

## 2018-03-20 PROCEDURE — 36415 COLL VENOUS BLD VENIPUNCTURE: CPT | Performed by: NURSE PRACTITIONER

## 2018-03-20 PROCEDURE — 74011000258 HC RX REV CODE- 258: Performed by: SURGERY

## 2018-03-20 PROCEDURE — 3331090001 HH PPS REVENUE CREDIT

## 2018-03-20 PROCEDURE — 85027 COMPLETE CBC AUTOMATED: CPT | Performed by: SURGERY

## 2018-03-20 PROCEDURE — 74011250637 HC RX REV CODE- 250/637: Performed by: SURGERY

## 2018-03-20 PROCEDURE — 74011250637 HC RX REV CODE- 250/637: Performed by: NURSE PRACTITIONER

## 2018-03-20 PROCEDURE — 74011250636 HC RX REV CODE- 250/636: Performed by: EMERGENCY MEDICINE

## 2018-03-20 RX ADMIN — Medication: at 06:09

## 2018-03-20 RX ADMIN — DAKIN'S SOLUTION 0.125% (QUARTER STRENGTH): 0.12 SOLUTION at 09:00

## 2018-03-20 RX ADMIN — NYSTATIN: 100000 CREAM TOPICAL at 09:36

## 2018-03-20 RX ADMIN — LORAZEPAM 1 MG: 2 INJECTION INTRAMUSCULAR; INTRAVENOUS at 23:28

## 2018-03-20 RX ADMIN — Medication: at 14:50

## 2018-03-20 RX ADMIN — Medication 10 ML: at 09:39

## 2018-03-20 RX ADMIN — PIPERACILLIN AND TAZOBACTAM 3.38 G: 3; .375 INJECTION, POWDER, FOR SOLUTION INTRAVENOUS at 05:30

## 2018-03-20 RX ADMIN — Medication 10 ML: at 05:31

## 2018-03-20 RX ADMIN — INSULIN LISPRO 3 UNITS: 100 INJECTION, SOLUTION INTRAVENOUS; SUBCUTANEOUS at 23:28

## 2018-03-20 RX ADMIN — HYDROMORPHONE HYDROCHLORIDE 1.5 MG: 2 INJECTION INTRAMUSCULAR; INTRAVENOUS; SUBCUTANEOUS at 22:06

## 2018-03-20 RX ADMIN — HYDROMORPHONE HYDROCHLORIDE 1.5 MG: 2 INJECTION INTRAMUSCULAR; INTRAVENOUS; SUBCUTANEOUS at 00:04

## 2018-03-20 RX ADMIN — Medication: at 22:02

## 2018-03-20 RX ADMIN — PIPERACILLIN AND TAZOBACTAM 3.38 G: 3; .375 INJECTION, POWDER, FOR SOLUTION INTRAVENOUS at 12:01

## 2018-03-20 RX ADMIN — DRONABINOL 2.5 MG: 2.5 CAPSULE ORAL at 17:00

## 2018-03-20 RX ADMIN — NYSTATIN: 100000 CREAM TOPICAL at 17:02

## 2018-03-20 RX ADMIN — Medication 10 ML: at 22:09

## 2018-03-20 RX ADMIN — ACETAMINOPHEN 650 MG: 325 TABLET, FILM COATED ORAL at 19:42

## 2018-03-20 RX ADMIN — INSULIN LISPRO 4 UNITS: 100 INJECTION, SOLUTION INTRAVENOUS; SUBCUTANEOUS at 00:05

## 2018-03-20 RX ADMIN — OCTREOTIDE ACETATE 50 MCG: 50 INJECTION, SOLUTION INTRAVENOUS; SUBCUTANEOUS at 00:04

## 2018-03-20 RX ADMIN — ENOXAPARIN SODIUM 40 MG: 100 INJECTION SUBCUTANEOUS at 09:35

## 2018-03-20 RX ADMIN — OCTREOTIDE ACETATE 50 MCG: 50 INJECTION, SOLUTION INTRAVENOUS; SUBCUTANEOUS at 16:58

## 2018-03-20 RX ADMIN — INSULIN LISPRO 3 UNITS: 100 INJECTION, SOLUTION INTRAVENOUS; SUBCUTANEOUS at 05:58

## 2018-03-20 RX ADMIN — INSULIN LISPRO 3 UNITS: 100 INJECTION, SOLUTION INTRAVENOUS; SUBCUTANEOUS at 11:57

## 2018-03-20 RX ADMIN — HYDROMORPHONE HYDROCHLORIDE 1.5 MG: 2 INJECTION INTRAMUSCULAR; INTRAVENOUS; SUBCUTANEOUS at 10:58

## 2018-03-20 RX ADMIN — OCTREOTIDE ACETATE 50 MCG: 50 INJECTION, SOLUTION INTRAVENOUS; SUBCUTANEOUS at 22:08

## 2018-03-20 RX ADMIN — LORAZEPAM 1 MG: 2 INJECTION INTRAMUSCULAR; INTRAVENOUS at 00:00

## 2018-03-20 RX ADMIN — HYDROMORPHONE HYDROCHLORIDE 1.5 MG: 2 INJECTION INTRAMUSCULAR; INTRAVENOUS; SUBCUTANEOUS at 03:00

## 2018-03-20 RX ADMIN — INSULIN LISPRO 3 UNITS: 100 INJECTION, SOLUTION INTRAVENOUS; SUBCUTANEOUS at 18:23

## 2018-03-20 RX ADMIN — PIPERACILLIN AND TAZOBACTAM 3.38 G: 3; .375 INJECTION, POWDER, FOR SOLUTION INTRAVENOUS at 22:08

## 2018-03-20 RX ADMIN — OCTREOTIDE ACETATE 50 MCG: 50 INJECTION, SOLUTION INTRAVENOUS; SUBCUTANEOUS at 09:36

## 2018-03-20 RX ADMIN — FAMOTIDINE: 10 INJECTION, SOLUTION INTRAVENOUS at 18:24

## 2018-03-20 NOTE — DIABETES MGMT
DTC Progress Note    Recommendations/ Comments: Chart reviewed due to hyperglycemia likely due to TPN. Pt is receiving 14 units of Regular insulin per liter for a total of 28 units delivered. She has required an additional 14 units of correction insulin over the last 24 hours. If appropriate, please consider increasing insulin in the TPN. DTC to follow. Current hospital DM medication: correction scale Humalog, resistant scale and insulin in TPN 14 units of Regular insulin per liter for a total of 28 units delivered. Chart reviewed on [address] Patient is a 38 y.o. female with known diabetes on Humalog correction scale at home. A1c:   No results found for: HBA1C, HGBE8, NNX2RPLR, NSW3FIFM    Recent Glucose Results:   Lab Results   Component Value Date/Time     (H) 03/20/2018 05:42 AM    GLUCPOC 196 (H) 03/20/2018 11:31 AM    GLUCPOC 199 (H) 03/20/2018 05:35 AM    GLUCPOC 213 (H) 03/19/2018 11:46 PM        Lab Results   Component Value Date/Time    Creatinine 0.61 03/20/2018 05:42 AM     Estimated Creatinine Clearance: 184.1 mL/min (based on Cr of 0.61). Active Orders   There are no active orders of the following type(s): Diet. PO intake: No data found. Will continue to follow as needed.     Thank you  Liliya Kilpatrick RD, CDE

## 2018-03-20 NOTE — PROGRESS NOTES
Progress Note    Patient: Billy Chandler MRN: [id number]  SSN: [id number]    YOB: 1975  Age: 38 y.o. Sex: female      Admit Date: 3/7/2018    13 Days Post-Op    Procedure:  Procedure(s):  EXPLORATORY LAPAROTOMY, LYSIS OF ADHESION X 5HOURS, SMALL BOWEL FISTULA TAKE DOWN X 2 AND WOUND VAC PLACEMENT    Subjective:     Patient in good spirits. No nausea with NGT out. Objective:     Visit Vitals    BP 90/60 (BP 1 Location: Left arm, BP Patient Position: At rest)    Pulse (!) 112    Temp 98.5 °F (36.9 °C)    Resp 18    Ht 5' 4\" (1.626 m)    Wt 343 lb 4.1 oz (155.7 kg)    SpO2 91%    BMI 58.92 kg/m2       Temp (24hrs), Av.6 °F (37 °C), Min:98.4 °F (36.9 °C), Max:98.8 °F (37.1 °C)      Physical Exam:    Gen- Alert in NAD  Lungs- CTA  H- Less Tachycardic. Abd- wound with granulating base. There is enteric drainage from the midportion of the wound. Some drainage in ostomy    Data Review: images and reports reviewed    Lab Review: All lab results for the last 24 hours reviewed.   Recent Results (from the past 24 hour(s))   GLUCOSE, POC    Collection Time: 18  5:59 PM   Result Value Ref Range    Glucose (POC) 206 (H) 65 - 100 mg/dL    Performed by Taras Lynch    GLUCOSE, POC    Collection Time: 18 11:46 PM   Result Value Ref Range    Glucose (POC) 213 (H) 65 - 100 mg/dL    Performed by Gardenia Isaac    GLUCOSE, POC    Collection Time: 18  5:35 AM   Result Value Ref Range    Glucose (POC) 199 (H) 65 - 100 mg/dL    Performed by Isamar Simeon    METABOLIC PANEL, BASIC    Collection Time: 18  5:42 AM   Result Value Ref Range    Sodium 137 136 - 145 mmol/L    Potassium 3.9 3.5 - 5.1 mmol/L    Chloride 104 97 - 108 mmol/L    CO2 25 21 - 32 mmol/L    Anion gap 8 5 - 15 mmol/L    Glucose 185 (H) 65 - 100 mg/dL    BUN 19 6 - 20 MG/DL    Creatinine 0.61 0.55 - 1.02 MG/DL    BUN/Creatinine ratio 31 (H) 12 - 20      GFR est AA >60 >60 ml/min/1.73m2    GFR est non-AA >60 >60 ml/min/1.73m2    Calcium 8.2 (L) 8.5 - 10.1 MG/DL       Assessment:     Hospital Problems  Date Reviewed: 10/27/2017          Codes Class Noted POA    Small bowel fistula ICD-10-CM: K63.2  ICD-9-CM: 569.81  3/7/2018 Unknown              Plan/Recommendations/Medical Decision Making:   Have seen the wound with WOCN- Will place additional Red rubber to help with drainage. Continue dressing changes. Ok to have sips but will not advance diet. Continue TPN.    PT    Signed By: Pam Toney MD     March 20, 2018

## 2018-03-20 NOTE — PROGRESS NOTES
03/20/18 0000   Vitals   Temp 98.4 °F (36.9 °C)   Temp Source Oral   Pulse (Heart Rate) (!) 116   Heart Rate Source Monitor   Resp Rate 20   O2 Sat (%) 98 %   Level of Consciousness Alert   BP 91/62   MAP (Calculated) 72   BP 1 Location Left arm   BP 1 Method Automatic   BP Patient Position At rest   Cardiac Rhythm Sinus Tach   MEWS Score 4   Pain 1   Pain Scale 1 Numeric (0 - 10)   Pain Intensity 1 8   Patient Stated Pain Goal 6   Pain Location 1 Abdomen   Pain Orientation 1 Anterior   Pain Description 1 Aching   Pain Intervention(s) 1 Medication (see MAR)   ST baseline heart rate for patient, no symptoms, no chest pain, no sob   Will continue to monitor patient

## 2018-03-20 NOTE — ROUTINE PROCESS
Bedside shift change report given to Candy Mckenna RN (oncoming nurse) by Rita Toney RN (offgoing nurse). Report included the following information SBAR, Procedure Summary, Intake/Output and Recent Results.

## 2018-03-20 NOTE — PROGRESS NOTES
Problem: Falls - Risk of  Goal: *Absence of Falls  Document Marlene Fall Risk and appropriate interventions in the flowsheet. Outcome: Progressing Towards Goal  Fall Risk Interventions:  Mobility Interventions: Patient to call before getting OOB         Medication Interventions: Patient to call before getting OOB    Elimination Interventions: Call light in reach    History of Falls Interventions: Door open when patient unattended, Investigate reason for fall        Problem: Pressure Injury - Risk of  Goal: *Prevention of pressure ulcer  Outcome: Progressing Towards Goal  Pt turned q2h. .  Heels offloaded      Problem: Surgical Wound Care  Goal: *Non-infected Wound: Absence of infection signs and symptoms  Infection control procedures (eg: clean dressings, clean gloves, hand washing, precautions to isolate wound from contamination, sterile instruments used for wound debridement) should be implemented.    Outcome: Progressing Towards Goal  Wound drain patent and draining

## 2018-03-20 NOTE — PROGRESS NOTES
Follow up visit attempted with MsLorraine Elenita. Pt has a visitor at bedside; did not disrupt their visit. Pt case discussed with Palliative IDT. Will attempt to follow-up as able.     Bhumika Cuello, Palliative

## 2018-03-20 NOTE — PROGRESS NOTES
Bedside shift change report given to [facility] KEVIN (oncoming nurse) by Jass Gomez (offgoing nurse). Report included the following information SBAR, Intake/Output, MAR, Recent Results and Cardiac Rhythm NSR.

## 2018-03-20 NOTE — WOUND CARE
WOCN Note:     Follow-up visit for abdominal wound. Seen with Dr. Teresa Camacho at bedside today. Chart shows:  Admitted for fistula takdown 3/7/18 by Dr. Davalos Comes with [facility] placement in [address] [place] [address] Street; history of multiple abdominal surgeries and Crohns disease. Admitted from home.      Assessment:   Patient is A&O x 4 and mobile but debilitated s/p abdominal surgery - she turned left & right with minimal assistance from RN for linen change. Sheet wrinkles noted to back and buttocks. Bed: total care bariatric   Pure Wick in use. Peripad placed due to onset of her period. Using PCA for pain.       1. Midline abdominal surgical wound = 24 x 15 x 5 cm. 85% pink moist wound base and 10% scattered devitalized stained areas of green and 5% dark burgundy in proximal end. Loose sutures removed by Dr. Teresa Camacho today. Topography of base is rounded outward with \"fingers\" of tissue that appear to be where sutures released. No malodor. ~200 cc of dark green in wall cannister that is connected via red rubber cath resting in distal wound bed. The gauze packing was saturated with green drainage. Wound base is protected from red rubber caths by contact layer of Mepitel One; one cath in central wound and the other in distal wound bed. 2 rolls of moist gauze packing placed around and red rubber secured to abdomen with tape and connected to wall suction @ 50 with \"Y\" connector. ABD toppers and secured with tape. Colostomy: stoma is red & moist; Small amount of mucoid green output; 2-piece flat pouch with good seal and not changed by me today.      Wound Recommendations:    Continue wound care as ordered. Skin Care & Pressure Relief Recommendations:  Minimize layers of linen/pads under patient to optimize support surface. Turn/reposition approximately every 2 hours and offload heels. Promote continence    Discussed above plan with patient & RN, Kyaw Wilder.     Transition of Care: Plan to follow as needed while admitted to hospital.    Destiny Colindres, YOLANDAN, RN, [facility]  Certified Wound, Ostomy, Continence Nurse  office [phone]  pager [phone] or call  to page

## 2018-03-20 NOTE — PROGRESS NOTES
Palliative Medicine Consult  [place]: [phone] ([phone])    Patient Name: Pradeep Love  YOB: 1975    Date of Initial Consult: 3/12/18  Reason for Consult: Pain management, chronic and post op   Requesting Provider: Rajni   Primary Care Physician: Luis A Deluca MD     SUMMARY:   Pradeep Love is a 38 y.o. with a past history of Crohn's disease (followed by Dr Rosy Osborne), mult surgeries s/p total colectomy w/ end ileostomy, 6/2017 ex lap with small bowel perforation, 6/28/17 ex lap, LPA, repair or enterotomy, SMA stent on L, 7/16/2917 ex lap, KATHRINE, fistula exclusion w/ feeding tube placement and prolonged TPN who was admitted on 3/7/2018 from home for planned surgery, s/p ex lap w/ extensive KATHRINE and small bowel fistula take down, wound vac placement. CT abd/pelvis 3/17 showing incr anterior wall dehiscence and enterocutaneous . Known to our team during past hospitalizations. Has had lengthy hospital stays w/ mult complications. Has required [facility] stays in past. Has chronic pain from Crohn's disease (and has not been able to tolerate home Crohn's meds recently due to acute issues- had been on steroids, stopped prior to surgery). Discussion of Remicaide in future. Current medical issues leading to Palliative Medicine involvement include: pain management. Patient's mother, Ysabel Presley is NOK.  reviewed, no abberrancies- one prescriber for opioids and one pharmacy. Most recently filled 3/9/18: MSContin 100mg tid and Morphine IR 30mg- 8 tabs a day. Has been tried on equivalent dose of Fentanyl patch w/out same benefit. Discussed Methadone but would have to check w/ her prescriber. PALLIATIVE DIAGNOSES:   1. Acute post operative pain in abdomen  2. Chronic abdominal pain from Cronh's disease and hx of surgeries  3. Nausea  4. Anxiety   5. Feeding difficulties due to medical condition- has been on TPN for several months  6. Edema   7.  L ankle pain s/p fall, normal XRs  8. Grief/depressive sx   9. Constipation      PLAN:     Abd pain from surgical incision and wound care as well as underlying chronic pain from Crohn's which is worse when off her steroids. 1. PCA ~11mg in past 8h.   2. Maintain current Dilaudid PCA 0.6mg/h and 0.5mg IV every 6 min  3. 1.5mg IV dilaudid every 3h PRN for pain not controlled with PCA  4. Will change back to home MSContin and removal basal from PCA when surgery gives the okay. Has done well in past, but if we see capsules in ostomy, will need to find another long acting med. Discussed methadone w/ her today but she is hesitant and would have to talk w/ her pain physician. 5. Encourage pt to work w/ therapies- goal is to get to chair today. 6. Following to assist w/ transition to po and for emotional support as pt needs. 7. Bowel regimen, anti-emetics per surgery. 8. Communicated plan of care with: Palliative IDT; Dr Akosua Dela Cruz / TREATMENT PREFERENCES:     GOALS OF CARE:  Patient/Health Care Proxy Stated Goals:  (Sx management )      TREATMENT PREFERENCES:   Code Status: Full Code    Advance Care Planning:  Advance Care Planning 3/15/2018   Patient's Healthcare Decision Maker is: Named in scanned ACP document   Primary Decision Maker Name Randall Stevens   Primary Decision Maker Phone Number [phone]   Primary Decision Maker Relationship to Patient Parent   Secondary Decision Maker Name Lia Nick   Secondary Decision Maker Phone Number [phone]   Secondary Decision Maker Relationship to Patient Unknown   Confirm Advance Directive Yes, on file   Patient Would Like to Complete Advance Directive -   Does the patient have other document types -       Medical Interventions: Full interventions           Other:    As far as possible, the palliative care team has discussed with patient / health care proxy about goals of care / treatment preferences for patient.            HISTORY:     Reviewed vitals, I/O's, labs, imaging, MAR, notes- had CT abd/pelvis over weekend. MAR includes   Marinol, missing doses  Dilaudid 2mg IV every 3h PRN, 6 doses in past 24h  Dilaudid pca, 0.6mg/hr basal, 0.5mg every 6min pca dose- approx 11mg in past 8h  Ativan 1mg IV prn   Zofran  Compazine  Phenergan  Scopolamine patch        HPI/SUBJECTIVE:    The patient is:   [x] Verbal and participatory  [] Non-participatory due to:     Pain a bit better today, ostomy more productive. Spirits good although still worried she will have ongoing swelling and debility while on TPN. No nausea w/ NGT removed.      Clinical Pain Assessment (nonverbal scale for severity on nonverbal patients):   Clinical Pain Assessment  Severity: 7  Location: Abdomen   Character: Sharp and aching   Duration: chronic and since surgery   Effect: harder to move   Factors: worse w/ movement and wound care  Frequency: constant          Duration: for how long has pt been experiencing pain (e.g., 2 days, 1 month, years)  Frequency: how often pain is an issue (e.g., several times per day, once every few days, constant)     FUNCTIONAL ASSESSMENT:     Palliative Performance Scale (PPS):  PPS: 60       PSYCHOSOCIAL/SPIRITUAL SCREENING:     Palliative IDT has assessed this patient for cultural preferences / practices and a referral made as appropriate to needs (Cultural Services, Patient Advocacy, Ethics, etc.)    Advance Care Planning:  Advance Care Planning 3/15/2018   Patient's Healthcare Decision Maker is: Named in scanned ACP document   Primary Decision Maker Name Ava Sharp   Primary Decision Maker Phone Number [phone]   Primary Decision Maker Relationship to Patient Parent   Secondary Decision Maker Name Pili Ledezma   Secondary Decision Maker Phone Number [phone]   Secondary Decision Maker Relationship to Patient Unknown   Confirm Advance Directive Yes, on file   Patient Would Like to Complete Advance Directive -   Does the patient have other document types - Any spiritual / Lutheran concerns:  [] Yes /  [x] No    Caregiver Burnout:  [] Yes /  [x] No /  [] No Caregiver Present      Anticipatory grief assessment:   [x] Normal  / [] Maladaptive       ESAS Anxiety: Anxiety: 3    ESAS Depression: Depression: 2        REVIEW OF SYSTEMS:     Positive and pertinent negative findings in ROS are noted above in HPI. The following systems were [x] reviewed / [] unable to be reviewed as noted in HPI  Other findings are noted below. Systems: constitutional, ears/nose/mouth/throat, respiratory, gastrointestinal, genitourinary, musculoskeletal, integumentary, neurologic, psychiatric, endocrine. Positive findings noted below. Modified ESAS Completed by: provider   Fatigue: 3 Drowsiness: 0   Depression: 2 Pain: 7   Anxiety: 3 Nausea: 1   Anorexia: 8 Dyspnea: 0     Constipation: Yes              PHYSICAL EXAM:     From RN flowsheet:  Wt Readings from Last 3 Encounters:   03/20/18 343 lb 4.1 oz (155.7 kg)   03/05/18 304 lb 3.2 oz (138 kg)   02/28/18 306 lb (138.8 kg)     Blood pressure 90/60, pulse (!) 112, temperature 98.5 °F (36.9 °C), resp. rate 18, height 5' 4\" (1.626 m), weight 343 lb 4.1 oz (155.7 kg), SpO2 91 %.     Pain Scale 1: Numeric (0 - 10)  Pain Intensity 1: 0  Pain Onset 1: post op  Pain Location 1: Abdomen  Pain Orientation 1: Anterior  Pain Description 1: Aching  Pain Intervention(s) 1: Medication (see MAR)  Last bowel movement, if known: stool output in ostomy     Constitutional: awake, alert, oriented, no sedation or confusion  Eyes: pupils equal, anicteric  ENMT: no nasal discharge, moist mucous membranes  Cardiovascular: regular rhythm, tachycardic at baseline  Respiratory: breathing not labored, symmetric  Gastrointestinal: soft , TTP, hypoactive bowel sounds, ostomy, midline incisions bandaged    Musculoskeletal: no deformity  Skin: warm, dry, pale  Ext: B/l pitting LE edema  Neurologic: following commands, moving all extremities  Psychiatric: full affect, no hallucinations         HISTORY:     Active Problems:    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)      Past Medical History:   Diagnosis Date    Adverse effect of anesthesia     WOKE DURING SURGERY    Arthritis     Blood clot in vein 2017    STOMACH    Crohn's disease ([facility] [address] 8/15/2011    DVT (deep venous thrombosis) ([facility] [address] 8/15/2011    LEFT LEG    Edema     GENERALIZED R/T TPN; WAIST DOWN    Incarcerated ventral hernia 8/15/2011    Lupus     Lyme disease     Psychiatric disorder     ANXIETY    Right flank pain 8/22/2011    Seizures ([facility] [address] 1990    LAST AT AGE 17    Stroke [facility]) 1990    age 17; A WEEK POST OP, HAD SEIZURES AND STROKE, WAS IN COMA FOR A MONTH    Thyroid disease     HYPO-NO MEDS      Past Surgical History:   Procedure Laterality Date    HX APPENDECTOMY      HX GYN  2015    HIDRADENTIS LABIA    HX OTHER SURGICAL      multiple procedures related to her Crohn's disease    HX OTHER SURGICAL      ABDOMINAL SURGERY REMOVING COLON, 2/3 STOMACH, 1/3 SMALL INTESTINE    [place] LAP, INCISIONAL HERNIA REPAIR,INCARCERATED  9-10-08    dr. Megan Lee      Family History   Problem Relation Age of Onset    Hypertension Father     Stroke Father     Other Father      arthritis    Arthritis-osteo Father     Hypertension Mother     Arthritis-osteo Mother     Anesth Problems Neg Hx       History reviewed, no pertinent family history.   Social History   Substance Use Topics    Smoking status: Former Smoker     Packs/day: 1.00     Years: 16.00     Quit date: 2/27/2017    Smokeless tobacco: Former User      Comment: OFF AND ON    Alcohol use No     Allergies   Allergen Reactions    Sulfa (Sulfonamide Antibiotics) Anaphylaxis    Demerol [Meperidine] Rash    Soma [Carisoprodol] Rash and Nausea Only    Toradol [Ketorolac Tromethamine] Nausea and Vomiting      Current Facility-Administered Medications   Medication Dose Route Frequency    TPN ADULT - CENTRAL   IntraVENous CONTINUOUS    TPN ADULT - CENTRAL   IntraVENous CONTINUOUS    acetaminophen (TYLENOL) tablet 650 mg  650 mg Oral Q6H PRN    diphenhydrAMINE (BENADRYL) capsule 25 mg  25 mg Oral Q6H PRN    enoxaparin (LOVENOX) injection 40 mg  40 mg SubCUTAneous Q24H    piperacillin-tazobactam (ZOSYN) 3.375 g in 0.9% sodium chloride (MBP/ADV) 100 mL  3.375 g IntraVENous Q8H    octreotide (SANDOSTATIN) injection 50 mcg  50 mcg IntraVENous TID    fat emulsion 20% (LIPOSYN, INTRAlipid) infusion 250 mL  250 mL IntraVENous Q MON, WED & FRI    HYDROmorphone (DILAUDID) injection 1.5 mg  1.5 mg IntraVENous Q3H PRN    0.9% sodium chloride infusion 250 mL  250 mL IntraVENous PRN    0.9% sodium chloride infusion 250 mL  250 mL IntraVENous PRN    nystatin (MYCOSTATIN) 100,000 unit/gram cream   Topical BID    prochlorperazine (COMPAZINE) with saline injection 5 mg  5 mg IntraVENous Q6H PRN    naloxone (NARCAN) injection 0.4 mg  0.4 mg IntraVENous EVERY 2 MINUTES AS NEEDED    0.9% sodium chloride infusion 250 mL  250 mL IntraVENous PRN    glucose chewable tablet 16 g  4 Tab Oral PRN    dextrose (D50W) injection syrg 12.5-25 g  12.5-25 g IntraVENous PRN    glucagon (GLUCAGEN) injection 1 mg  1 mg IntraMUSCular PRN    insulin lispro (HUMALOG) injection   SubCUTAneous Q6H    scopolamine (TRANSDERM-SCOP) 1 mg over 3 days 1 Patch  1 Patch TransDERmal Q72H    sodium chloride (NS) flush 20 mL  20 mL InterCATHeter PRN    sodium chloride (NS) flush 10 mL  10 mL InterCATHeter Q24H    sodium chloride (NS) flush 10 mL  10 mL InterCATHeter PRN    sodium chloride (NS) flush 10 mL  10 mL InterCATHeter Q8H    alteplase (CATHFLO) 1 mg in sterile water (preservative free) 1 mL injection  1 mg InterCATHeter PRN    bacitracin 500 unit/gram packet 1 Packet  1 Packet Topical PRN    sodium chloride (NS) flush 20 mL  20 mL InterCATHeter PRN    sodium chloride (NS) flush 10 mL  10 mL InterCATHeter Q24H    sodium chloride (NS) flush 10 mL  10 mL InterCATHeter PRN    sodium chloride (NS) flush 10 mL  10 mL InterCATHeter Q8H    dronabinol (MARINOL) capsule 2.5 mg  2.5 mg Oral BID    HYDROmorphone (PF) 15 mg/30 ml (DILAUDID) PCA   IntraVENous CONTINUOUS    phenol throat spray (CHLORASEPTIC) 1 Spray  1 Spray Oral PRN    ondansetron (ZOFRAN) injection 4 mg  4 mg IntraVENous Q4H PRN    promethazine (PHENERGAN) 12.5 mg in 0.9% sodium chloride 50 mL IVPB  12.5 mg IntraVENous Q6H PRN    LORazepam (ATIVAN) injection 1 mg  1 mg IntraVENous Q6H PRN    albuterol (PROVENTIL VENTOLIN) nebulizer solution 2.5 mg  2.5 mg Nebulization Q4H PRN          LAB AND IMAGING FINDINGS:     Lab Results   Component Value Date/Time    WBC 10.4 03/20/2018 11:32 AM    HGB 7.3 (L) 03/20/2018 11:32 AM    PLATELET 761 (L) 18/16/2409 11:32 AM     Lab Results   Component Value Date/Time    Sodium 137 03/20/2018 05:42 AM    Potassium 3.9 03/20/2018 05:42 AM    Chloride 104 03/20/2018 05:42 AM    CO2 25 03/20/2018 05:42 AM    BUN 19 03/20/2018 05:42 AM    Creatinine 0.61 03/20/2018 05:42 AM    Calcium 8.2 (L) 03/20/2018 05:42 AM    Magnesium 1.5 (L) 03/18/2018 02:24 AM    Phosphorus 3.8 03/18/2018 02:24 AM      Lab Results   Component Value Date/Time    AST (SGOT) 7 (L) 03/17/2018 03:11 AM    Alk. phosphatase 77 03/17/2018 03:11 AM    Protein, total 5.1 (L) 03/17/2018 03:11 AM    Albumin 1.6 (L) 03/17/2018 03:11 AM    Globulin 3.5 03/17/2018 03:11 AM     Lab Results   Component Value Date/Time    INR 1.0 12/07/2017 08:37 AM    Prothrombin time 10.0 12/07/2017 08:37 AM      No results found for: IRON, FE, TIBC, IBCT, PSAT, FERR   No results found for: PH, PCO2, PO2  No components found for: GLPOC   No results found for: CPK, CKMB             Total time:   Counseling / coordination time, spent as noted above:   > 50% counseling / coordination?:     Prolonged service was provided for  []30 min   []75 min in face to face time in the presence of the patient, spent as noted above.   Time Start:   Time End:   Note: this can only be billed with 64252 (initial) or 10052 (follow up). If multiple start / stop times, list each separately.

## 2018-03-20 NOTE — PROGRESS NOTES
03/20/18 0300   Vitals   Temp 98.8 °F (37.1 °C)   Temp Source Oral   Pulse (Heart Rate) (!) 114   Heart Rate Source Monitor   Resp Rate 18   Level of Consciousness Alert   BP 97/66   MAP (Calculated) 76   MEWS Score 4   vsMD cj aware of HR and BP  Will continue to monitor

## 2018-03-21 LAB
ANION GAP SERPL CALC-SCNC: 7 MMOL/L (ref 5–15)
BUN SERPL-MCNC: 17 MG/DL (ref 6–20)
BUN/CREAT SERPL: 28 (ref 12–20)
CALCIUM SERPL-MCNC: 8 MG/DL (ref 8.5–10.1)
CHLORIDE SERPL-SCNC: 106 MMOL/L (ref 97–108)
CO2 SERPL-SCNC: 25 MMOL/L (ref 21–32)
CREAT SERPL-MCNC: 0.6 MG/DL (ref 0.55–1.02)
ERYTHROCYTE [DISTWIDTH] IN BLOOD BY AUTOMATED COUNT: 18 % (ref 11.5–14.5)
GLUCOSE BLD STRIP.AUTO-MCNC: 142 MG/DL (ref 65–100)
GLUCOSE BLD STRIP.AUTO-MCNC: 157 MG/DL (ref 65–100)
GLUCOSE BLD STRIP.AUTO-MCNC: 164 MG/DL (ref 65–100)
GLUCOSE BLD STRIP.AUTO-MCNC: 172 MG/DL (ref 65–100)
GLUCOSE BLD STRIP.AUTO-MCNC: 383 MG/DL (ref 65–100)
GLUCOSE SERPL-MCNC: 151 MG/DL (ref 65–100)
HCT VFR BLD AUTO: 22.1 % (ref 35–47)
HGB BLD-MCNC: 7 G/DL (ref 11.5–16)
MAGNESIUM SERPL-MCNC: 1.6 MG/DL (ref 1.6–2.4)
MCH RBC QN AUTO: 30.4 PG (ref 26–34)
MCHC RBC AUTO-ENTMCNC: 31.7 G/DL (ref 30–36.5)
MCV RBC AUTO: 96.1 FL (ref 80–99)
NRBC # BLD: 0.06 K/UL (ref 0–0.01)
NRBC BLD-RTO: 0.6 PER 100 WBC
PHOSPHATE SERPL-MCNC: 4.3 MG/DL (ref 2.6–4.7)
PLATELET # BLD AUTO: 159 K/UL (ref 150–400)
PMV BLD AUTO: 11.2 FL (ref 8.9–12.9)
POTASSIUM SERPL-SCNC: 4.1 MMOL/L (ref 3.5–5.1)
RBC # BLD AUTO: 2.3 M/UL (ref 3.8–5.2)
SERVICE CMNT-IMP: ABNORMAL
SODIUM SERPL-SCNC: 138 MMOL/L (ref 136–145)
WBC # BLD AUTO: 10.6 K/UL (ref 3.6–11)

## 2018-03-21 PROCEDURE — 86900 BLOOD TYPING SEROLOGIC ABO: CPT | Performed by: SURGERY

## 2018-03-21 PROCEDURE — 74011250636 HC RX REV CODE- 250/636: Performed by: EMERGENCY MEDICINE

## 2018-03-21 PROCEDURE — 74011250636 HC RX REV CODE- 250/636: Performed by: PHYSICAL MEDICINE & REHABILITATION

## 2018-03-21 PROCEDURE — 3331090002 HH PPS REVENUE DEBIT

## 2018-03-21 PROCEDURE — 85027 COMPLETE CBC AUTOMATED: CPT | Performed by: SURGERY

## 2018-03-21 PROCEDURE — 74011250636 HC RX REV CODE- 250/636: Performed by: NURSE PRACTITIONER

## 2018-03-21 PROCEDURE — 74011636637 HC RX REV CODE- 636/637: Performed by: SURGERY

## 2018-03-21 PROCEDURE — 82962 GLUCOSE BLOOD TEST: CPT

## 2018-03-21 PROCEDURE — 84100 ASSAY OF PHOSPHORUS: CPT | Performed by: SURGERY

## 2018-03-21 PROCEDURE — 36415 COLL VENOUS BLD VENIPUNCTURE: CPT | Performed by: SURGERY

## 2018-03-21 PROCEDURE — 74011000258 HC RX REV CODE- 258: Performed by: SURGERY

## 2018-03-21 PROCEDURE — 36430 TRANSFUSION BLD/BLD COMPNT: CPT

## 2018-03-21 PROCEDURE — 3331090001 HH PPS REVENUE CREDIT

## 2018-03-21 PROCEDURE — 83735 ASSAY OF MAGNESIUM: CPT | Performed by: SURGERY

## 2018-03-21 PROCEDURE — 74011250636 HC RX REV CODE- 250/636: Performed by: SURGERY

## 2018-03-21 PROCEDURE — 77030018836 HC SOL IRR NACL ICUM -A

## 2018-03-21 PROCEDURE — 86921 COMPATIBILITY TEST INCUBATE: CPT | Performed by: SURGERY

## 2018-03-21 PROCEDURE — 80048 BASIC METABOLIC PNL TOTAL CA: CPT | Performed by: SURGERY

## 2018-03-21 PROCEDURE — 74011250637 HC RX REV CODE- 250/637: Performed by: PHYSICIAN ASSISTANT

## 2018-03-21 PROCEDURE — 86922 COMPATIBILITY TEST ANTIGLOB: CPT | Performed by: SURGERY

## 2018-03-21 PROCEDURE — 74011250637 HC RX REV CODE- 250/637: Performed by: SURGERY

## 2018-03-21 PROCEDURE — 65660000000 HC RM CCU STEPDOWN

## 2018-03-21 PROCEDURE — 74011000250 HC RX REV CODE- 250: Performed by: SURGERY

## 2018-03-21 PROCEDURE — P9016 RBC LEUKOCYTES REDUCED: HCPCS | Performed by: SURGERY

## 2018-03-21 PROCEDURE — 86902 BLOOD TYPE ANTIGEN DONOR EA: CPT | Performed by: SURGERY

## 2018-03-21 PROCEDURE — 86920 COMPATIBILITY TEST SPIN: CPT | Performed by: SURGERY

## 2018-03-21 RX ORDER — SODIUM CHLORIDE 9 MG/ML
250 INJECTION, SOLUTION INTRAVENOUS AS NEEDED
Status: DISCONTINUED | OUTPATIENT
Start: 2018-03-21 | End: 2018-05-23 | Stop reason: HOSPADM

## 2018-03-21 RX ORDER — MORPHINE SULFATE 4 MG/ML
5 INJECTION, SOLUTION INTRAMUSCULAR; INTRAVENOUS
Status: COMPLETED | OUTPATIENT
Start: 2018-03-21 | End: 2018-03-21

## 2018-03-21 RX ADMIN — ENOXAPARIN SODIUM 40 MG: 100 INJECTION SUBCUTANEOUS at 10:29

## 2018-03-21 RX ADMIN — Medication 10 ML: at 05:40

## 2018-03-21 RX ADMIN — INSULIN LISPRO 3 UNITS: 100 INJECTION, SOLUTION INTRAVENOUS; SUBCUTANEOUS at 19:15

## 2018-03-21 RX ADMIN — OCTREOTIDE ACETATE 50 MCG: 50 INJECTION, SOLUTION INTRAVENOUS; SUBCUTANEOUS at 22:00

## 2018-03-21 RX ADMIN — I.V. FAT EMULSION 250 ML: 20 EMULSION INTRAVENOUS at 19:19

## 2018-03-21 RX ADMIN — PIPERACILLIN AND TAZOBACTAM 3.38 G: 3; .375 INJECTION, POWDER, FOR SOLUTION INTRAVENOUS at 05:38

## 2018-03-21 RX ADMIN — HYDROMORPHONE HYDROCHLORIDE 1.5 MG: 2 INJECTION INTRAMUSCULAR; INTRAVENOUS; SUBCUTANEOUS at 22:00

## 2018-03-21 RX ADMIN — Medication: at 06:37

## 2018-03-21 RX ADMIN — PIPERACILLIN AND TAZOBACTAM 3.38 G: 3; .375 INJECTION, POWDER, FOR SOLUTION INTRAVENOUS at 14:14

## 2018-03-21 RX ADMIN — Medication: at 14:42

## 2018-03-21 RX ADMIN — INSULIN LISPRO 3 UNITS: 100 INJECTION, SOLUTION INTRAVENOUS; SUBCUTANEOUS at 05:39

## 2018-03-21 RX ADMIN — Medication 10 ML: at 12:14

## 2018-03-21 RX ADMIN — NYSTATIN: 100000 CREAM TOPICAL at 10:30

## 2018-03-21 RX ADMIN — Medication 10 ML: at 22:00

## 2018-03-21 RX ADMIN — NYSTATIN: 100000 CREAM TOPICAL at 19:15

## 2018-03-21 RX ADMIN — FAMOTIDINE: 10 INJECTION, SOLUTION INTRAVENOUS at 20:05

## 2018-03-21 RX ADMIN — DRONABINOL 2.5 MG: 2.5 CAPSULE ORAL at 10:29

## 2018-03-21 RX ADMIN — LORAZEPAM 1 MG: 2 INJECTION INTRAMUSCULAR; INTRAVENOUS at 22:00

## 2018-03-21 RX ADMIN — Medication: at 20:50

## 2018-03-21 RX ADMIN — Medication 10 ML: at 12:13

## 2018-03-21 RX ADMIN — OCTREOTIDE ACETATE 50 MCG: 50 INJECTION, SOLUTION INTRAVENOUS; SUBCUTANEOUS at 18:49

## 2018-03-21 RX ADMIN — DRONABINOL 2.5 MG: 2.5 CAPSULE ORAL at 19:15

## 2018-03-21 RX ADMIN — ACETAMINOPHEN 650 MG: 325 TABLET, FILM COATED ORAL at 23:10

## 2018-03-21 RX ADMIN — INSULIN LISPRO 3 UNITS: 100 INJECTION, SOLUTION INTRAVENOUS; SUBCUTANEOUS at 14:14

## 2018-03-21 RX ADMIN — HYDROMORPHONE HYDROCHLORIDE 1.5 MG: 2 INJECTION INTRAMUSCULAR; INTRAVENOUS; SUBCUTANEOUS at 05:39

## 2018-03-21 RX ADMIN — OCTREOTIDE ACETATE 50 MCG: 50 INJECTION, SOLUTION INTRAVENOUS; SUBCUTANEOUS at 12:13

## 2018-03-21 RX ADMIN — Medication 10 ML: at 05:39

## 2018-03-21 RX ADMIN — MORPHINE SULFATE 5 MG: 4 INJECTION, SOLUTION INTRAMUSCULAR; INTRAVENOUS at 14:13

## 2018-03-21 RX ADMIN — PIPERACILLIN AND TAZOBACTAM 3.38 G: 3; .375 INJECTION, POWDER, FOR SOLUTION INTRAVENOUS at 20:59

## 2018-03-21 NOTE — PROGRESS NOTES
Follow up visit attempted with Hector Cespedes. Spoke with her mother at bedside as pt was resting. No specific needs expressed at this time. Assurance of prayers offered.     Bhumika Garcia, Palliative

## 2018-03-21 NOTE — ROUTINE PROCESS
Bedside shift change report given to Narciso Sarabia (oncoming nurse) by Jeraline Rinne (offgoing nurse). Report included the following information SBAR, Kardex, Procedure Summary, Intake/Output, MAR and Recent Results.

## 2018-03-21 NOTE — PROGRESS NOTES
Progress Note    Patient: Estelita Bush MRN: [id number]  SSN: [id number]    YOB: 1975  Age: 38 y.o. Sex: female      Admit Date: 3/7/2018    14 Days Post-Op    Procedure:  Procedure(s):  EXPLORATORY LAPAROTOMY, LYSIS OF ADHESION X 5HOURS, SMALL BOWEL FISTULA TAKE DOWN X 2 AND WOUND VAC PLACEMENT    Subjective:     Patient with some abdominal pain. Noting increased drainage. No pain with New red rubber tube. Objective:     Visit Vitals    BP (!) 87/46 (BP 1 Location: Left arm, BP Patient Position: At rest)    Pulse (!) 103    Temp 98.2 °F (36.8 °C)    Resp 18    Ht 5' 4\" (1.626 m)    Wt 349 lb 3.3 oz (158.4 kg)    SpO2 97%    BMI 59.94 kg/m2       Temp (24hrs), Av.6 °F (37 °C), Min:98.2 °F (36.8 °C), Max:98.9 °F (37.2 °C)      Physical Exam:    Gen- Alert In NAD  Lungs- CTA  H- Mildly tachycardic   Abd- S/ Dressing with some drainage. Red rubbers functional   Mild drainage from LLQ. Data Review: images and reports reviewed    Lab Review: All lab results for the last 24 hours reviewed.   Recent Results (from the past 24 hour(s))   GLUCOSE, POC    Collection Time: 18 11:31 AM   Result Value Ref Range    Glucose (POC) 196 (H) 65 - 100 mg/dL    Performed by Unkown     CBC W/O DIFF    Collection Time: 18 11:32 AM   Result Value Ref Range    WBC 10.4 3.6 - 11.0 K/uL    RBC 2.37 (L) 3.80 - 5.20 M/uL    HGB 7.3 (L) 11.5 - 16.0 g/dL    HCT 24.0 (L) 35.0 - 47.0 %    .3 (H) 80.0 - 99.0 FL    MCH 30.8 26.0 - 34.0 PG    MCHC 30.4 30.0 - 36.5 g/dL    RDW 18.6 (H) 11.5 - 14.5 %    PLATELET 361 (L) 178 - 400 K/uL    MPV 11.4 8.9 - 12.9 FL    NRBC 0.5 (H) 0  WBC    ABSOLUTE NRBC 0.05 (H) 0.00 - 0.01 K/uL   GLUCOSE, POC    Collection Time: 18  6:07 PM   Result Value Ref Range    Glucose (POC) 146 (H) 65 - 100 mg/dL    Performed by Unkown     GLUCOSE, POC    Collection Time: 18 11:16 PM   Result Value Ref Range    Glucose (POC) 161 (H) 65 - 100 mg/dL    Performed by Vitaliy Blanco 150, POC    Collection Time: 03/21/18  5:27 AM   Result Value Ref Range    Glucose (POC) 164 (H) 65 - 100 mg/dL    Performed by Ermelinda Castellanos    CBC W/O DIFF    Collection Time: 03/21/18  5:33 AM   Result Value Ref Range    WBC 10.6 3.6 - 11.0 K/uL    RBC 2.30 (L) 3.80 - 5.20 M/uL    HGB 7.0 (L) 11.5 - 16.0 g/dL    HCT 22.1 (L) 35.0 - 47.0 %    MCV 96.1 80.0 - 99.0 FL    MCH 30.4 26.0 - 34.0 PG    MCHC 31.7 30.0 - 36.5 g/dL    RDW 18.0 (H) 11.5 - 14.5 %    PLATELET 351 755 - 345 K/uL    MPV 11.2 8.9 - 12.9 FL    NRBC 0.6 (H) 0  WBC    ABSOLUTE NRBC 0.06 (H) 0.00 - 2.69 K/uL   METABOLIC PANEL, BASIC    Collection Time: 03/21/18  5:33 AM   Result Value Ref Range    Sodium 138 136 - 145 mmol/L    Potassium 4.1 3.5 - 5.1 mmol/L    Chloride 106 97 - 108 mmol/L    CO2 25 21 - 32 mmol/L    Anion gap 7 5 - 15 mmol/L    Glucose 151 (H) 65 - 100 mg/dL    BUN 17 6 - 20 MG/DL    Creatinine 0.60 0.55 - 1.02 MG/DL    BUN/Creatinine ratio 28 (H) 12 - 20      GFR est AA >60 >60 ml/min/1.73m2    GFR est non-AA >60 >60 ml/min/1.73m2    Calcium 8.0 (L) 8.5 - 10.1 MG/DL   MAGNESIUM    Collection Time: 03/21/18  5:33 AM   Result Value Ref Range    Magnesium 1.6 1.6 - 2.4 mg/dL   PHOSPHORUS    Collection Time: 03/21/18  5:33 AM   Result Value Ref Range    Phosphorus 4.3 2.6 - 4.7 MG/DL       Assessment:     Hospital Problems  Date Reviewed: 10/27/2017          Codes Class Noted POA    Small bowel fistula ICD-10-CM: K63.2  ICD-9-CM: 569.81  3/7/2018 Unknown              Plan/Recommendations/Medical Decision Making:   Continue TPN  Not quite ready for Oral pain medications  Transfuse 1unit PRBC  PT  Continue Sips.      Signed By: Pam Toney MD     March 21, 2018

## 2018-03-21 NOTE — PROGRESS NOTES
Physical therapy note:   Patient hypotensive and pending 1 unit PRBC due to hgb 7. Will defer and continue to follow as appropriate for progression of mobility/transfers. Vaishali Kraus, PT, DPT

## 2018-03-21 NOTE — PROGRESS NOTES
Palliative Medicine Consult  [place]: [phone] ([phone])    Patient Name: Laurie Bailey  YOB: 1975    Date of Initial Consult: 3/12/18  Reason for Consult: Pain management, chronic and post op   Requesting Provider: Rajni   Primary Care Physician: Jackie Asencio MD     SUMMARY:   Laurie Bailey is a 38 y.o. with a past history of Crohn's disease (followed by Dr Jacqueline Shaw), mult surgeries s/p total colectomy w/ end ileostomy, 6/2017 ex lap with small bowel perforation, 6/28/17 ex lap, LPA, repair or enterotomy, SMA stent on L, 7/16/2917 ex lap, KATHRINE, fistula exclusion w/ feeding tube placement and prolonged TPN who was admitted on 3/7/2018 from home for planned surgery, s/p ex lap w/ extensive KATHRINE and small bowel fistula take down, wound vac placement. CT abd/pelvis 3/17 showing incr anterior wall dehiscence and enterocutaneous . Known to our team during past hospitalizations. Has had lengthy hospital stays w/ mult complications. Has required [facility] stays in past. Has chronic pain from Crohn's disease (and has not been able to tolerate home Crohn's meds recently due to acute issues- had been on steroids, stopped prior to surgery). Discussion of Remicaide in future. Current medical issues leading to Palliative Medicine involvement include: pain management. Patient's mother, Kylie Castillo is NOK.  reviewed, no abberrancies- one prescriber for opioids and one pharmacy. Most recently filled 3/9/18: MSContin 100mg tid and Morphine IR 30mg- 8 tabs a day. Has been tried on equivalent dose of Fentanyl patch w/out same benefit. Discussed Methadone but would have to check w/ her prescriber. PALLIATIVE DIAGNOSES:   1. Acute post operative pain in abdomen  2. Chronic abdominal pain from Cronh's disease and hx of surgeries  3. Nausea  4. Anxiety   5. Feeding difficulties due to medical condition- has been on TPN for several months  6. Edema   7.  L ankle pain s/p fall, normal XRs- improving 8. Grief/depressive sx   9. Constipation   10. Opioid tolerance      PLAN:     Abd pain from surgical incision and wound care as well as underlying chronic pain from Crohn's which is worse when off her steroids. Has high opioid tolerance, no aberrancies on . 1. Pt feeling that Dilaudid PCA and IV push not helping as much although her surgical pain improving. This is not uncommon given her high doses and high tolerance. Last stay in the fall we had to opioid rotate in order to avoid just escalating doses. 2. Reiterated education about why I don't want to just escalate Dilaudid- this would not set her up thrive once she leaves her. Understands. In past did well w/ opioid rotation to morphine, as she is on po morphine at home. 3. Maintain current Dilaudid PCA 0.6mg/h and 0.5mg IV every 6 min. 4. Try one time dose of 5mg IV morphine prn. If improves pain, can switch to morphine from dilaudid IV pushes. 5.  1.5mg IV dilaudid every 3h PRN for pain not controlled with PCA  6. Will change back to home MSContin and removal basal from PCA when surgery gives the okay. 7. Following to assist w/ transition to po and for emotional support as pt needs. 8. Bowel regimen, anti-emetics per surgery. 9. Communicated plan of care with: Palliative IDT;  Naomie Cervantes RN       GOALS OF CARE / TREATMENT PREFERENCES:     GOALS OF CARE:  Patient/Health Care Proxy Stated Goals:  (Sx management )      TREATMENT PREFERENCES:   Code Status: Full Code    Advance Care Planning:  Advance Care Planning 3/15/2018   Patient's Healthcare Decision Maker is: Named in scanned ACP document   Primary Decision Maker Name Nereida Jade   Primary Decision Maker Phone Number [phone]   Primary Decision Maker Relationship to Patient Parent   Secondary Decision Maker Name Telly Nath Phone Number [phone]   Secondary Decision Maker Relationship to Patient Unknown   Confirm Advance Directive Yes, on file   Patient Would Like to Complete Advance Directive -   Does the patient have other document types -       Medical Interventions: Full interventions           Other:    As far as possible, the palliative care team has discussed with patient / health care proxy about goals of care / treatment preferences for patient. HISTORY:     Reviewed vitals, I/O's, labs, imaging, MAR, notes- had CT abd/pelvis over weekend. MAR includes   Marinol, missing doses  Dilaudid 2mg IV every 3h PRN, 3 doses in past 24h  Dilaudid pca, 0.6mg/hr basal, 0.5mg every 6min pca dose  Ativan 1mg IV prn   Zofran  Compazine  Phenergan  Scopolamine patch        HPI/SUBJECTIVE:    The patient is:   [x] Verbal and participatory  [] Non-participatory due to:     Pain stable in abdomen, but feeling that that not as effective when pushes PCA. No current nausea w/ sips of fluid.     Clinical Pain Assessment (nonverbal scale for severity on nonverbal patients):   Clinical Pain Assessment  Severity: 6  Location: Abdomen   Character: Sharp and aching   Duration: chronic and since surgery   Effect: harder to move   Factors: worse w/ movement and wound care  Frequency: constant          Duration: for how long has pt been experiencing pain (e.g., 2 days, 1 month, years)  Frequency: how often pain is an issue (e.g., several times per day, once every few days, constant)     FUNCTIONAL ASSESSMENT:     Palliative Performance Scale (PPS):  PPS: 60       PSYCHOSOCIAL/SPIRITUAL SCREENING:     Palliative IDT has assessed this patient for cultural preferences / practices and a referral made as appropriate to needs (Cultural Services, Patient Advocacy, Ethics, etc.)    Advance Care Planning:  Advance Care Planning 3/15/2018   Patient's Healthcare Decision Maker is: Named in scanned ACP document   Primary Decision Maker Name Frances Biggs   Primary Decision Maker Phone Number [phone]   Primary Decision Maker Relationship to Patient Parent   Secondary Decision Maker Name Sara Looney   Secondary Decision Maker Phone Number [phone]   Secondary Decision Maker Relationship to Patient Unknown   Confirm Advance Directive Yes, on file   Patient Would Like to Complete Advance Directive -   Does the patient have other document types -       Any spiritual / Hinduism concerns:  [] Yes /  [x] No    Caregiver Burnout:  [] Yes /  [x] No /  [] No Caregiver Present      Anticipatory grief assessment:   [x] Normal  / [] Maladaptive       ESAS Anxiety: Anxiety: 1    ESAS Depression: Depression: 2        REVIEW OF SYSTEMS:     Positive and pertinent negative findings in ROS are noted above in HPI. The following systems were [x] reviewed / [] unable to be reviewed as noted in HPI  Other findings are noted below. Systems: constitutional, ears/nose/mouth/throat, respiratory, gastrointestinal, genitourinary, musculoskeletal, integumentary, neurologic, psychiatric, endocrine. Positive findings noted below. Modified ESAS Completed by: provider   Fatigue: 3 Drowsiness: 0   Depression: 2 Pain: 6   Anxiety: 1 Nausea: 1   Anorexia: 8 Dyspnea: 0     Constipation: Yes              PHYSICAL EXAM:     From RN flowsheet:  Wt Readings from Last 3 Encounters:   03/21/18 349 lb 3.3 oz (158.4 kg)   03/05/18 304 lb 3.2 oz (138 kg)   02/28/18 306 lb (138.8 kg)     Blood pressure (!) 87/46, pulse (!) 103, temperature 98.2 °F (36.8 °C), resp. rate 18, height 5' 4\" (1.626 m), weight 349 lb 3.3 oz (158.4 kg), SpO2 97 %.     Pain Scale 1: Numeric (0 - 10)  Pain Intensity 1: 3  Pain Onset 1: post op  Pain Location 1: Abdomen  Pain Orientation 1: Anterior  Pain Description 1: Aching  Pain Intervention(s) 1: Encouraged PCA  Last bowel movement, if known: stool output in ostomy     Constitutional: awake, alert, oriented, no sedation or confusion  Eyes: pupils equal, anicteric  ENMT: no nasal discharge, moist mucous membranes  Cardiovascular: regular rhythm, tachycardic at baseline  Respiratory: breathing not labored, symmetric  Gastrointestinal: soft , hypoactive bowel sounds, ostomy, midline incisions bandaged    Musculoskeletal: no deformity  Skin: warm, dry, pale  Ext: B/l pitting LE edema  Neurologic: following commands, moving all extremities  Psychiatric: full affect, no hallucinations         HISTORY:     Active Problems:    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)      Past Medical History:   Diagnosis Date    Adverse effect of anesthesia     WOKE DURING SURGERY    Arthritis     Blood clot in vein 2017    STOMACH    Crohn's disease ([address] 8/15/2011    DVT (deep venous thrombosis) ([facility] 8/15/2011    LEFT LEG    Edema     GENERALIZED R/T TPN; WAIST DOWN    Incarcerated ventral hernia 8/15/2011    Lupus     Lyme disease     Psychiatric disorder     ANXIETY    Right flank pain 8/22/2011    Seizures ([facility] [address] 1990    LAST AT AGE 17    Stroke [facility]) 1990    age 17; A WEEK POST OP, HAD SEIZURES AND STROKE, WAS IN COMA FOR A MONTH    Thyroid disease     HYPO-NO MEDS      Past Surgical History:   Procedure Laterality Date    HX APPENDECTOMY      HX GYN  2015    HIDRADENTIS LABIA    HX OTHER SURGICAL      multiple procedures related to her Crohn's disease    HX OTHER SURGICAL      ABDOMINAL SURGERY REMOVING COLON, 2/3 STOMACH, 1/3 SMALL INTESTINE    [place] LAP, INCISIONAL HERNIA REPAIR,INCARCERATED  9-10-08    dr. Orly Benites      Family History   Problem Relation Age of Onset    Hypertension Father     Stroke Father     Other Father      arthritis    Arthritis-osteo Father     Hypertension Mother     Arthritis-osteo Mother     Anesth Problems Neg Hx       History reviewed, no pertinent family history.   Social History   Substance Use Topics    Smoking status: Former Smoker     Packs/day: 1.00     Years: 16.00     Quit date: 2/27/2017    Smokeless tobacco: Former User      Comment: OFF AND ON    Alcohol use No     Allergies   Allergen Reactions    Sulfa (Sulfonamide Antibiotics) Anaphylaxis    Demerol [Meperidine] Rash    Soma [Carisoprodol] Rash and Nausea Only    Toradol [Ketorolac Tromethamine] Nausea and Vomiting      Current Facility-Administered Medications   Medication Dose Route Frequency    TPN ADULT - CENTRAL   IntraVENous CONTINUOUS    0.9% sodium chloride infusion 250 mL  250 mL IntraVENous PRN    morphine injection 5 mg  5 mg IntraVENous ONCE PRN    TPN ADULT - CENTRAL   IntraVENous CONTINUOUS    acetaminophen (TYLENOL) tablet 650 mg  650 mg Oral Q6H PRN    diphenhydrAMINE (BENADRYL) capsule 25 mg  25 mg Oral Q6H PRN    enoxaparin (LOVENOX) injection 40 mg  40 mg SubCUTAneous Q24H    piperacillin-tazobactam (ZOSYN) 3.375 g in 0.9% sodium chloride (MBP/ADV) 100 mL  3.375 g IntraVENous Q8H    octreotide (SANDOSTATIN) injection 50 mcg  50 mcg IntraVENous TID    fat emulsion 20% (LIPOSYN, INTRAlipid) infusion 250 mL  250 mL IntraVENous Q MON, WED & FRI    HYDROmorphone (DILAUDID) injection 1.5 mg  1.5 mg IntraVENous Q3H PRN    0.9% sodium chloride infusion 250 mL  250 mL IntraVENous PRN    0.9% sodium chloride infusion 250 mL  250 mL IntraVENous PRN    nystatin (MYCOSTATIN) 100,000 unit/gram cream   Topical BID    prochlorperazine (COMPAZINE) with saline injection 5 mg  5 mg IntraVENous Q6H PRN    naloxone (NARCAN) injection 0.4 mg  0.4 mg IntraVENous EVERY 2 MINUTES AS NEEDED    0.9% sodium chloride infusion 250 mL  250 mL IntraVENous PRN    glucose chewable tablet 16 g  4 Tab Oral PRN    dextrose (D50W) injection syrg 12.5-25 g  12.5-25 g IntraVENous PRN    glucagon (GLUCAGEN) injection 1 mg  1 mg IntraMUSCular PRN    insulin lispro (HUMALOG) injection   SubCUTAneous Q6H    scopolamine (TRANSDERM-SCOP) 1 mg over 3 days 1 Patch  1 Patch TransDERmal Q72H    sodium chloride (NS) flush 20 mL  20 mL InterCATHeter PRN    sodium chloride (NS) flush 10 mL  10 mL InterCATHeter Q24H    sodium chloride (NS) flush 10 mL  10 mL InterCATHeter PRN    sodium chloride (NS) flush 10 mL  10 mL InterCATHeter Q8H    alteplase (CATHFLO) 1 mg in sterile water (preservative free) 1 mL injection  1 mg InterCATHeter PRN    bacitracin 500 unit/gram packet 1 Packet  1 Packet Topical PRN    sodium chloride (NS) flush 20 mL  20 mL InterCATHeter PRN    sodium chloride (NS) flush 10 mL  10 mL InterCATHeter Q24H    sodium chloride (NS) flush 10 mL  10 mL InterCATHeter PRN    sodium chloride (NS) flush 10 mL  10 mL InterCATHeter Q8H    dronabinol (MARINOL) capsule 2.5 mg  2.5 mg Oral BID    HYDROmorphone (PF) 15 mg/30 ml (DILAUDID) PCA   IntraVENous CONTINUOUS    phenol throat spray (CHLORASEPTIC) 1 Spray  1 Spray Oral PRN    ondansetron (ZOFRAN) injection 4 mg  4 mg IntraVENous Q4H PRN    promethazine (PHENERGAN) 12.5 mg in 0.9% sodium chloride 50 mL IVPB  12.5 mg IntraVENous Q6H PRN    LORazepam (ATIVAN) injection 1 mg  1 mg IntraVENous Q6H PRN    albuterol (PROVENTIL VENTOLIN) nebulizer solution 2.5 mg  2.5 mg Nebulization Q4H PRN          LAB AND IMAGING FINDINGS:     Lab Results   Component Value Date/Time    WBC 10.6 03/21/2018 05:33 AM    HGB 7.0 (L) 03/21/2018 05:33 AM    PLATELET 753 57/01/5379 05:33 AM     Lab Results   Component Value Date/Time    Sodium 138 03/21/2018 05:33 AM    Potassium 4.1 03/21/2018 05:33 AM    Chloride 106 03/21/2018 05:33 AM    CO2 25 03/21/2018 05:33 AM    BUN 17 03/21/2018 05:33 AM    Creatinine 0.60 03/21/2018 05:33 AM    Calcium 8.0 (L) 03/21/2018 05:33 AM    Magnesium 1.6 03/21/2018 05:33 AM    Phosphorus 4.3 03/21/2018 05:33 AM      Lab Results   Component Value Date/Time    AST (SGOT) 7 (L) 03/17/2018 03:11 AM    Alk.  phosphatase 77 03/17/2018 03:11 AM    Protein, total 5.1 (L) 03/17/2018 03:11 AM    Albumin 1.6 (L) 03/17/2018 03:11 AM    Globulin 3.5 03/17/2018 03:11 AM     Lab Results   Component Value Date/Time    INR 1.0 12/07/2017 08:37 AM    Prothrombin time 10.0 12/07/2017 08:37 AM      No results found for: IRON, FE, TIBC, IBCT, PSAT, FERR   No results found for: PH, PCO2, PO2  No components found for: GLPOC   No results found for: CPK, CKMB             Total time:   Counseling / coordination time, spent as noted above:   > 50% counseling / coordination?:     Prolonged service was provided for  []30 min   []75 min in face to face time in the presence of the patient, spent as noted above. Time Start:   Time End:   Note: this can only be billed with 28632 (initial) or 74563 (follow up). If multiple start / stop times, list each separately.

## 2018-03-21 NOTE — PROGRESS NOTES
NUTRITION COMPLETE ASSESSMENT    RECOMMENDATIONS:   1. Is it possible to rezero the pt's bedscale or obtain actual weight via constantin lift? **Pt with 17.8 kg (39#) weight gain x 12 days and new weight would help to better assess TPN needs**    2. Add zinc sulfate 10 mg daily x 10 days (if ok with Pharmacy, otherwise, 3x/week) to TPN for wound healing and secondary to wound losses    3. Add ascorbic acid 500 mg to TPN for wound healing    4. Weekly LFT's and bilirubin to monitor liver function (may need to resume cyclic regimen to prevent cholestasis)     Interventions/Plan:   Food/Nutrient Delivery:  TPN    Assessment:   Reason for Assessment:   [x]Reassessment     Diet:  NPO with ice chips  Nutritionally Significant Medications: [x] Reviewed & Includes: Marinol (2.5 mg twice/day); sandostatin 3x/day; sopolamine patch q 72 hours; humalog correction scale (insulin resistant), zofran q 4 hours prn; zosyn q 8 hours IV; compazine q 6 hours prn; phenergen q 6 hours prn     TPN: 6%AA D20 @ 83 mL/hr + MVI, thiamine (100 mg), 14 units insulin/L, famotidine 40 mg, trace elements 3x/week + 20% lipids, 250 mL 3x/week    Subjective:  No change in feeding plan. Pt remains TPN dependent and NPO with Ice chips. Objective:  Chart reviewed, discussed with RN and team during interdisciplinary rounds. Pt remains TPN dependent and suspect this will be ongoing for some time. She is still on a continuous, 24 hour regimen. Per surgery on 3/19, the fascia dehisced. WOCN also following midline abdominal surgical wound closely as assisting with drainage of the fistula via red rubber catheter. Electrolytes are WNL  Liver function was normal on 3/17-- would check weekly    BG remain elevated-- DTC following. May benefit from increasing insulin in TPN to prevent BG in the 300's. Currently receiving 28 units/day via TPN. If 1 unit/10 gmCHO desired, she would require 38 units/day (19 units/L).     TPN (as above) is providing a daily average of 2047 kcal, 120 g protein in 1992 mL (2242 mL on lipid days)-- meeting 100% of estimated needs. GIR not accurate in pt with questionable weights. Pt with 17.8 kg (39#) weight gain x 12 days. Drain Output:   645 mL-- 3/20  475 mL-- 3/19  300 mL-- 3/18    Ileostomy Output:  10 mL-- 3/20  80 mL-- 3/19  300 mL-- 3/18    NGT removed on 3/19/18 and pt tolerating so far without issues. Estimated Nutrition Needs:   Kcals/day: 1507 Kcals/day (5983-4357 kcal/day (11-15 kcal/kg))  Protein: 110 g (0.8 g/kg)  Fluid: 2000 ml (or per output)     Based On: Kcal/kg - specify (Comment)  Weight Used: Actual wt (137 kg (need updated wt))    Pt expected to meet estimated nutrient needs:  [x]   Yes (with TPN)    Nutrition Diagnosis:   1. 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 Less than optimal parenteral nutrition related to low volume/concentration TPN as evidenced by resolved.     Goals:     Pt to meet at least 90% of estimated needs via TPN over the next 5-7 days     Monitoring & Evaluation:    - Enteral/parenteral nutrition intake   - Weight/weight change     Previous Nutrition Goals Met:  Yes  Previous Recommendations:      Yes    Education & Discharge Needs:   [x] None Identified   [] Identified and addressed    [x] Participated in care plan, discharge planning, and/or interdisciplinary rounds        Cultural, Hinduism and ethnic food preferences identified:  NONE      Skin Integrity: []Intact  [x]Other: see wound documentation (pt with surgical wounds, dehisced wound)  Edema: []None [x]Other:2+ and generalized anasarcca  Last BM: 3/20/18 (ostomy)  Food Allergies: [x]None []Other    Anthropometrics:    Weight Loss Metrics 3/21/2018 3/7/2018 3/5/2018 2/28/2018 2/27/2018 2/27/2018 2/26/2018   Today's Wt 349 lb 3.3 oz - 304 lb 3.2 oz 306 lb 306 lb 8 oz - 307 lb   BMI - 59.94 kg/m2 52.22 kg/m2 52.52 kg/m2 52.61 kg/m2 - 52.7 kg/m2 Last 3 Recorded Weights in this Encounter    03/19/18 0348 03/20/18 0300 03/21/18 0551   Weight: 156 kg (343 lb 14.7 oz) 155.7 kg (343 lb 4.1 oz) 158.4 kg (349 lb 3.3 oz)      Weight Source: Bed  Height: 5' 4\" (162.6 cm),    Body mass index is 59.94 kg/(m^2). IBW : 54.4 kg (120 lb),    Usual Body Weight: 135.2 kg (298 lb) (1/2018),      Labs:    Lab Results   Component Value Date/Time    Sodium 138 03/21/2018 05:33 AM    Potassium 4.1 03/21/2018 05:33 AM    Chloride 106 03/21/2018 05:33 AM    CO2 25 03/21/2018 05:33 AM    Glucose 151 (H) 03/21/2018 05:33 AM    BUN 17 03/21/2018 05:33 AM    Creatinine 0.60 03/21/2018 05:33 AM    Calcium 8.0 (L) 03/21/2018 05:33 AM    Magnesium 1.6 03/21/2018 05:33 AM    Phosphorus 4.3 03/21/2018 05:33 AM    Albumin 1.6 (L) 03/17/2018 03:11 AM     No results found for: HBA1C, HGBE8, WBO4CEME, WCP4SGCG  Lab Results   Component Value Date/Time    Glucose 151 (H) 03/21/2018 05:33 AM    Glucose (POC) 157 (H) 03/21/2018 12:16 PM      Lab Results   Component Value Date/Time    ALT (SGPT) 17 03/17/2018 03:11 AM    AST (SGOT) 7 (L) 03/17/2018 03:11 AM    Alk.  phosphatase 77 03/17/2018 03:11 AM    Bilirubin, direct 0.1 07/07/2009 06:38 PM    Bilirubin, total 0.7 03/17/2018 03:11 AM      [address]

## 2018-03-21 NOTE — PROGRESS NOTES
Bedside shift change report given to Romario (oncoming nurse) by Jennifer Kaufman (offgoing nurse). Report included the following information SBAR, MAR, Recent Results and Cardiac Rhythm sinus tachy.

## 2018-03-22 LAB
ANION GAP SERPL CALC-SCNC: 9 MMOL/L (ref 5–15)
BUN SERPL-MCNC: 16 MG/DL (ref 6–20)
BUN/CREAT SERPL: 25 (ref 12–20)
CALCIUM SERPL-MCNC: 7.7 MG/DL (ref 8.5–10.1)
CHLORIDE SERPL-SCNC: 103 MMOL/L (ref 97–108)
CO2 SERPL-SCNC: 23 MMOL/L (ref 21–32)
CREAT SERPL-MCNC: 0.64 MG/DL (ref 0.55–1.02)
ERYTHROCYTE [DISTWIDTH] IN BLOOD BY AUTOMATED COUNT: 17.2 % (ref 11.5–14.5)
GLUCOSE BLD STRIP.AUTO-MCNC: 148 MG/DL (ref 65–100)
GLUCOSE BLD STRIP.AUTO-MCNC: 175 MG/DL (ref 65–100)
GLUCOSE BLD STRIP.AUTO-MCNC: 175 MG/DL (ref 65–100)
GLUCOSE SERPL-MCNC: 230 MG/DL (ref 65–100)
HCT VFR BLD AUTO: 24 % (ref 35–47)
HGB BLD-MCNC: 8.4 G/DL (ref 11.5–16)
MAGNESIUM SERPL-MCNC: 1.8 MG/DL (ref 1.6–2.4)
MCH RBC QN AUTO: 32.7 PG (ref 26–34)
MCHC RBC AUTO-ENTMCNC: 35 G/DL (ref 30–36.5)
MCV RBC AUTO: 93.4 FL (ref 80–99)
NRBC # BLD: 0.08 K/UL (ref 0–0.01)
NRBC BLD-RTO: 0.6 PER 100 WBC
PHOSPHATE SERPL-MCNC: 4.5 MG/DL (ref 2.6–4.7)
PLATELET # BLD AUTO: 181 K/UL (ref 150–400)
PMV BLD AUTO: 11.2 FL (ref 8.9–12.9)
POTASSIUM SERPL-SCNC: 4.1 MMOL/L (ref 3.5–5.1)
RBC # BLD AUTO: 2.57 M/UL (ref 3.8–5.2)
SERVICE CMNT-IMP: ABNORMAL
SODIUM SERPL-SCNC: 135 MMOL/L (ref 136–145)
WBC # BLD AUTO: 13.5 K/UL (ref 3.6–11)

## 2018-03-22 PROCEDURE — 74011250636 HC RX REV CODE- 250/636: Performed by: SURGERY

## 2018-03-22 PROCEDURE — 3331090001 HH PPS REVENUE CREDIT

## 2018-03-22 PROCEDURE — 74011636637 HC RX REV CODE- 636/637: Performed by: NURSE PRACTITIONER

## 2018-03-22 PROCEDURE — 74011250637 HC RX REV CODE- 250/637: Performed by: PHYSICIAN ASSISTANT

## 2018-03-22 PROCEDURE — 82962 GLUCOSE BLOOD TEST: CPT

## 2018-03-22 PROCEDURE — 74011250636 HC RX REV CODE- 250/636: Performed by: EMERGENCY MEDICINE

## 2018-03-22 PROCEDURE — 97110 THERAPEUTIC EXERCISES: CPT

## 2018-03-22 PROCEDURE — 80048 BASIC METABOLIC PNL TOTAL CA: CPT | Performed by: SURGERY

## 2018-03-22 PROCEDURE — 65660000000 HC RM CCU STEPDOWN

## 2018-03-22 PROCEDURE — 74011250636 HC RX REV CODE- 250/636: Performed by: NURSE PRACTITIONER

## 2018-03-22 PROCEDURE — 74011000258 HC RX REV CODE- 258: Performed by: SURGERY

## 2018-03-22 PROCEDURE — 36415 COLL VENOUS BLD VENIPUNCTURE: CPT | Performed by: SURGERY

## 2018-03-22 PROCEDURE — 74011250636 HC RX REV CODE- 250/636: Performed by: PHYSICAL MEDICINE & REHABILITATION

## 2018-03-22 PROCEDURE — 74011250637 HC RX REV CODE- 250/637: Performed by: SURGERY

## 2018-03-22 PROCEDURE — 84100 ASSAY OF PHOSPHORUS: CPT | Performed by: SURGERY

## 2018-03-22 PROCEDURE — 85027 COMPLETE CBC AUTOMATED: CPT | Performed by: SURGERY

## 2018-03-22 PROCEDURE — 77030018836 HC SOL IRR NACL ICUM -A

## 2018-03-22 PROCEDURE — 3331090002 HH PPS REVENUE DEBIT

## 2018-03-22 PROCEDURE — 74011636637 HC RX REV CODE- 636/637: Performed by: SURGERY

## 2018-03-22 PROCEDURE — 83735 ASSAY OF MAGNESIUM: CPT | Performed by: SURGERY

## 2018-03-22 PROCEDURE — 74011000258 HC RX REV CODE- 258: Performed by: NURSE PRACTITIONER

## 2018-03-22 PROCEDURE — 74011000250 HC RX REV CODE- 250: Performed by: NURSE PRACTITIONER

## 2018-03-22 PROCEDURE — 74011250637 HC RX REV CODE- 250/637: Performed by: NURSE PRACTITIONER

## 2018-03-22 RX ORDER — HYDROMORPHONE HCL/0.9% NACL/PF 0.5 MG/ML
PLASTIC BAG, INJECTION (ML) INTRAVENOUS CONTINUOUS
Status: DISCONTINUED | OUTPATIENT
Start: 2018-03-22 | End: 2018-04-03

## 2018-03-22 RX ORDER — MORPHINE SULFATE 4 MG/ML
5 INJECTION, SOLUTION INTRAMUSCULAR; INTRAVENOUS
Status: DISCONTINUED | OUTPATIENT
Start: 2018-03-22 | End: 2018-03-24

## 2018-03-22 RX ORDER — MORPHINE SULFATE 10 MG/ML
5 INJECTION, SOLUTION INTRAMUSCULAR; INTRAVENOUS
Status: DISCONTINUED | OUTPATIENT
Start: 2018-03-22 | End: 2018-03-22 | Stop reason: SDUPTHER

## 2018-03-22 RX ORDER — FUROSEMIDE 10 MG/ML
20 INJECTION INTRAMUSCULAR; INTRAVENOUS DAILY
Status: COMPLETED | OUTPATIENT
Start: 2018-03-22 | End: 2018-03-24

## 2018-03-22 RX ORDER — SODIUM CHLORIDE 0.9 % (FLUSH) 0.9 %
SYRINGE (ML) INJECTION
Status: DISPENSED
Start: 2018-03-22 | End: 2018-03-23

## 2018-03-22 RX ORDER — ASPIRIN 325 MG
325 TABLET ORAL DAILY
Status: DISCONTINUED | OUTPATIENT
Start: 2018-03-22 | End: 2018-05-23 | Stop reason: HOSPADM

## 2018-03-22 RX ADMIN — OCTREOTIDE ACETATE 50 MCG: 50 INJECTION, SOLUTION INTRAVENOUS; SUBCUTANEOUS at 10:55

## 2018-03-22 RX ADMIN — PIPERACILLIN AND TAZOBACTAM 3.38 G: 3; .375 INJECTION, POWDER, FOR SOLUTION INTRAVENOUS at 12:26

## 2018-03-22 RX ADMIN — Medication: at 03:45

## 2018-03-22 RX ADMIN — Medication 10 ML: at 21:43

## 2018-03-22 RX ADMIN — Medication 10 ML: at 11:13

## 2018-03-22 RX ADMIN — INSULIN LISPRO 3 UNITS: 100 INJECTION, SOLUTION INTRAVENOUS; SUBCUTANEOUS at 18:06

## 2018-03-22 RX ADMIN — DRONABINOL 2.5 MG: 2.5 CAPSULE ORAL at 17:39

## 2018-03-22 RX ADMIN — DRONABINOL 2.5 MG: 2.5 CAPSULE ORAL at 10:10

## 2018-03-22 RX ADMIN — SODIUM CHLORIDE 250 ML: 900 INJECTION, SOLUTION INTRAVENOUS at 18:07

## 2018-03-22 RX ADMIN — PIPERACILLIN AND TAZOBACTAM 3.38 G: 3; .375 INJECTION, POWDER, FOR SOLUTION INTRAVENOUS at 21:41

## 2018-03-22 RX ADMIN — Medication 10 ML: at 14:29

## 2018-03-22 RX ADMIN — Medication: at 11:34

## 2018-03-22 RX ADMIN — MORPHINE SULFATE 5 MG: 4 INJECTION, SOLUTION INTRAMUSCULAR; INTRAVENOUS at 17:35

## 2018-03-22 RX ADMIN — Medication 10 ML: at 11:34

## 2018-03-22 RX ADMIN — FUROSEMIDE 20 MG: 10 INJECTION, SOLUTION INTRAMUSCULAR; INTRAVENOUS at 11:11

## 2018-03-22 RX ADMIN — HYDROMORPHONE HYDROCHLORIDE 1.5 MG: 2 INJECTION INTRAMUSCULAR; INTRAVENOUS; SUBCUTANEOUS at 10:09

## 2018-03-22 RX ADMIN — ASPIRIN 325 MG: 325 TABLET ORAL at 11:12

## 2018-03-22 RX ADMIN — Medication 10 ML: at 06:00

## 2018-03-22 RX ADMIN — INSULIN LISPRO 3 UNITS: 100 INJECTION, SOLUTION INTRAVENOUS; SUBCUTANEOUS at 06:06

## 2018-03-22 RX ADMIN — INSULIN LISPRO 3 UNITS: 100 INJECTION, SOLUTION INTRAVENOUS; SUBCUTANEOUS at 12:26

## 2018-03-22 RX ADMIN — Medication: at 18:06

## 2018-03-22 RX ADMIN — MORPHINE SULFATE 5 MG: 10 INJECTION, SOLUTION INTRAMUSCULAR; INTRAVENOUS at 14:27

## 2018-03-22 RX ADMIN — INSULIN LISPRO 3 UNITS: 100 INJECTION, SOLUTION INTRAVENOUS; SUBCUTANEOUS at 00:21

## 2018-03-22 RX ADMIN — MORPHINE SULFATE 5 MG: 4 INJECTION, SOLUTION INTRAMUSCULAR; INTRAVENOUS at 23:06

## 2018-03-22 RX ADMIN — OCTREOTIDE ACETATE 50 MCG: 50 INJECTION, SOLUTION INTRAVENOUS; SUBCUTANEOUS at 17:35

## 2018-03-22 RX ADMIN — ASCORBIC ACID: 500 INJECTION, SOLUTION INTRAMUSCULAR; INTRAVENOUS; SUBCUTANEOUS at 20:00

## 2018-03-22 RX ADMIN — ENOXAPARIN SODIUM 40 MG: 100 INJECTION SUBCUTANEOUS at 10:09

## 2018-03-22 RX ADMIN — PIPERACILLIN AND TAZOBACTAM 3.38 G: 3; .375 INJECTION, POWDER, FOR SOLUTION INTRAVENOUS at 05:00

## 2018-03-22 RX ADMIN — OCTREOTIDE ACETATE 50 MCG: 50 INJECTION, SOLUTION INTRAVENOUS; SUBCUTANEOUS at 21:42

## 2018-03-22 RX ADMIN — ACETAMINOPHEN 650 MG: 325 TABLET, FILM COATED ORAL at 23:18

## 2018-03-22 NOTE — PROGRESS NOTES
Problem: Mobility Impaired (Adult and Pediatric)  Goal: *Acute Goals and Plan of Care (Insert Text)  Physical Therapy Goals  Revisited 3/19/2018, goals remain appropriate, carry over    Physical Therapy Goals  Initiated 3/8/2018  1. Patient will move from supine to sit and sit to supine  in bed with moderate assistance  within 7 day(s). 2.  Patient will transfer from bed to chair and chair to bed with moderate assistance  using the least restrictive device within 7 day(s). 3.  Patient will perform sit to stand with moderate assistance  within 7 day(s). 4.  PT will assess gait with the least restrictive device within 7 day(s). physical Therapy TREATMENT  Patient: Kymberly Bolaños (38 y.o. female)  Date: 3/22/2018  Diagnosis: FISTULA  Small bowel fistula <principal problem not specified>  Procedure(s) (LRB):  EXPLORATORY LAPAROTOMY, LYSIS OF ADHESION X 5HOURS, SMALL BOWEL FISTULA TAKE DOWN X 2 AND WOUND VAC PLACEMENT (N/A) 15 Days Post-Op  Precautions:   fall, contact   Chart, physical therapy assessment, plan of care and goals were reviewed. ASSESSMENT:  Pt received up to the stretcher chair agreeable to PT. She rated her pain at 7.5 using PCA. She worked on the following ex bilaterally 10 reps: LAQs, ankle pumps, active assisted sitting marching, and resisted elbow extension. We also worked on a comfortable degree of briefly sitting without back support, pt sliding her fingers towards her knees, x 10 reps. HR in the 120s during session. Pt appeared more focused on tasks today, working hard. Gains slow. Progression toward goals:  []    Improving appropriately and progressing toward goals  [x]    Improving slowly and progressing toward goals  []    Not making progress toward goals and plan of care will be adjusted     PLAN:  Patient continues to benefit from skilled intervention to address the above impairments. Continue treatment per established plan of care.   Discharge Recommendations:  Rehab and To Be Determined  Further Equipment Recommendations for Discharge: To be determined      SUBJECTIVE:   Patient stated I just have to go slow.     OBJECTIVE DATA SUMMARY:   Chart checked, pt cleared by nursing  Critical Behavior:  Neurologic State: (P) Alert  Orientation Level: (P) Oriented X4  Cognition: (P) Appropriate decision making     Functional Mobility Training:  Bed Mobility:            not assessed        Transfers:               not assessed                    Balance:  Sitting: Impaired; Without support  Sitting - Static: Fair (occasional)  Sitting - Dynamic: Fair (occasional)  Ambulation/Gait Training:                                                      n/a  Stairs:              Neuro Re-Education:    Therapeutic Exercises:   See assessment above  Pain:  Pain Scale 1: Numeric (0 - 10)  Pain Intensity 1: 7.5, using pca  Pain Location 1: Abdomen  Pain Orientation 1: Anterior;Medial  Pain Description 1: Aching;Constant  Pain Intervention(s) 1: Medication (see MAR)  Activity Tolerance:   HR in the 120s during ex  Please refer to the flowsheet for vital signs taken during this treatment.   After treatment:   [x]    Patient left in no apparent distress sitting up in chair  []    Patient left in no apparent distress in bed  [x]    Call bell left within reach  [x]    Nursing notified  []    Caregiver present  []    Bed alarm activated    COMMUNICATION/COLLABORATION:   The patients plan of care was discussed with: Registered Nurse    Alycia Franz

## 2018-03-22 NOTE — ROUTINE PROCESS
Bedside and Verbal shift change report given to Autumn Puckett (oncoming nurse) by Durga Ball (offgoing nurse). Report included the following information SBAR, Kardex, Procedure Summary, MAR, Recent Results, Med Rec Status and Cardiac Rhythm Sinus Tach.

## 2018-03-22 NOTE — WOUND CARE
WOCN Note:     Follow-up visit for abdominal wound. Seen with Bia Rodriguez NP, at bedside today.      Chart shows:  Admitted for fistula takdown 3/7/18 by Dr. Harish Crouch with [facility] placement in [address] [place] [address]; history of multiple abdominal surgeries and Crohns disease. Admitted from home.      Assessment:   Patient is A&O x 4 and mobile but debilitated s/p abdominal surgery. She reports the Pure Wick leaking and prefers the female urinal.  Peripad in place for menstruation. Bed: total care bariatric     Using PCA for pain.       1. Midline abdominal surgical wound = 24 x 15 x 5 cm. 85% pink moist wound base and 10% scattered devitalized stained areas of green and 5% dark burgundy in proximal end. Suture noted in base. Topography of base is rounded outward with \"fingers\" of tissue that appear to be where sutures released. No malodor.    Dark green in wall cannister that is connected via 2 red rubber caths resting in central and distal wound bed. Wound base is protected from red rubber caths by contact layer of Mepitel One  2 rolls of moist gauze packing placed around and red rubber secured to abdomen with tape and connected to wall suction @ 50 with \"Y\" connector. ABD toppers and secured with tape.      Colostomy: stoma is red & moist; Small amount of mucoid green output; 2-piece flat pouch with good seal and not changed by me today.       Wound Recommendations:    Continue wound care as ordered. Skin Care & Pressure Relief Recommendations:  Minimize layers of linen/pads under patient to optimize support surface. Turn/reposition approximately every 2 hours and offload heels. Manage incontinence / promote continence; Aloe Vesta to buttocks and sacrum daily and as needed    Discussed above plan with patient & Vamshi DOTSON. Transition of Care: Plan to follow as needed while admitted to hospital with RN's doing daily dressing changes.      Ellen Watkins, YOLANDAN, RN, [address]  Certified Wound, Ostomy, Continence Nurse  office [phone]  pager [phone] or call  to page

## 2018-03-22 NOTE — PROGRESS NOTES
03/21/18 2045   Vitals   Temp 99.7 °F (37.6 °C)   Temp Source Oral   Pulse (Heart Rate) (!) 117   Heart Rate Source Monitor   Resp Rate 18   O2 Sat (%) 97 %   Level of Consciousness Alert   /48   MAP (Calculated) 66   BP 1 Location Left arm   BP 1 Method Automatic   BP Patient Position At rest   Cardiac Rhythm Sinus Tach   MEWS Score 3   Alarms Set and Audible Cardiac alarms   Box Number 433   Electrodes Replaced No     Sinus tach is patient's baseline. Patient is resting quietly. Patient is currently receiving blood and is asymptomatic. Will continue to monitor.

## 2018-03-22 NOTE — PROGRESS NOTES
Daily Progress Note  [facility] General Surgery at [address] Date: 3/7/2018  Post-Operative Day: 15 from Procedure(s):  EXPLORATORY LAPAROTOMY, LYSIS OF ADHESION X 5HOURS, SMALL BOWEL FISTULA TAKE DOWN X 2 AND WOUND VAC PLACEMENT     Subjective:     Last 24 hrs: pain adequately controlled. Continues on TPN/lipid/octreotide. Blood sugars still elevated. Her biggest complaint is swelling in her feet, which is limiting her mobility. asking for \"water pill\" to get the swelling down. Pt seen with Eliot Shoemaker, wound care RN. Objective:     Blood pressure 93/44, pulse (!) 106, temperature 98.3 °F (36.8 °C), resp. rate 16, height 5' 4\" (1.626 m), weight 158.4 kg (349 lb 3.3 oz), SpO2 96 %. Temp (24hrs), Av.1 °F (37.3 °C), Min:98.3 °F (36.8 °C), Max:100.1 °F (37.8 °C)      _____________________  Physical Exam:     Alert and Oriented, lying in bed, in no acute distress. Cardiovascular: tachy, regular. + 4 bilateral pedal edema. Lungs:CTAB   Abdomen: large abdominal wound with enteric drainage from fistula and tan at inferior aspect of wound. Red rubber x 2 to suction collecting drainage. Wound is filling in with pink granulation tissue, scant bloody oozing with dressing change. 300 mL of output charted for yesterday. 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    Monitor fistula output  Continue TPN, add zinc and ascorbic acid per nutrition recs  Increase insulin in TPN  Lasix x 3 doses for LE edema  Continue zosyn  Labs in am        Claudeen Barrows, ACNP - [address] [facility] at [place] [address],  [facility], [address], [place]  [phone]    Data Review:    Recent Labs      18   0005  18   0533  18   1132   WBC  13.5*  10.6  10.4   HGB  8.4*  7.0*  7.3*   HCT  24.0*  22.1*  24.0*   PLT  181  159 137*     Recent Labs      03/22/18   0005  03/21/18   0533  03/20/18   0542   NA  135*  138  137   K  4.1  4.1  3.9   CL  103  106  104   CO2  23  25  25   GLU  230*  151*  185*   BUN  16  17  19   CREA  0.64  0.60  0.61   CA  7.7*  8.0*  8.2*   MG  1.8  1.6   --    PHOS  4.5  4.3   --      No results for input(s): AML, LPSE in the last 72 hours.         ______________________  Medications:    Current Facility-Administered Medications   Medication Dose Route Frequency    TPN ADULT - CENTRAL   IntraVENous CONTINUOUS    furosemide (LASIX) injection 20 mg  20 mg IntraVENous DAILY    aspirin (ASPIRIN) tablet 325 mg  325 mg Oral DAILY    TPN ADULT - CENTRAL   IntraVENous CONTINUOUS    0.9% sodium chloride infusion 250 mL  250 mL IntraVENous PRN    acetaminophen (TYLENOL) tablet 650 mg  650 mg Oral Q6H PRN    diphenhydrAMINE (BENADRYL) capsule 25 mg  25 mg Oral Q6H PRN    enoxaparin (LOVENOX) injection 40 mg  40 mg SubCUTAneous Q24H    piperacillin-tazobactam (ZOSYN) 3.375 g in 0.9% sodium chloride (MBP/ADV) 100 mL  3.375 g IntraVENous Q8H    octreotide (SANDOSTATIN) injection 50 mcg  50 mcg IntraVENous TID    fat emulsion 20% (LIPOSYN, INTRAlipid) infusion 250 mL  250 mL IntraVENous Q MON, WED & FRI    HYDROmorphone (DILAUDID) injection 1.5 mg  1.5 mg IntraVENous Q3H PRN    0.9% sodium chloride infusion 250 mL  250 mL IntraVENous PRN    0.9% sodium chloride infusion 250 mL  250 mL IntraVENous PRN    nystatin (MYCOSTATIN) 100,000 unit/gram cream   Topical BID    prochlorperazine (COMPAZINE) with saline injection 5 mg  5 mg IntraVENous Q6H PRN    naloxone (NARCAN) injection 0.4 mg  0.4 mg IntraVENous EVERY 2 MINUTES AS NEEDED    0.9% sodium chloride infusion 250 mL  250 mL IntraVENous PRN    glucose chewable tablet 16 g  4 Tab Oral PRN    dextrose (D50W) injection syrg 12.5-25 g  12.5-25 g IntraVENous PRN    glucagon (GLUCAGEN) injection 1 mg  1 mg IntraMUSCular PRN    insulin lispro (HUMALOG) injection   SubCUTAneous Q6H    scopolamine (TRANSDERM-SCOP) 1 mg over 3 days 1 Patch  1 Patch TransDERmal Q72H    sodium chloride (NS) flush 20 mL  20 mL InterCATHeter PRN    sodium chloride (NS) flush 10 mL  10 mL InterCATHeter Q24H    sodium chloride (NS) flush 10 mL  10 mL InterCATHeter PRN    sodium chloride (NS) flush 10 mL  10 mL InterCATHeter Q8H    alteplase (CATHFLO) 1 mg in sterile water (preservative free) 1 mL injection  1 mg InterCATHeter PRN    bacitracin 500 unit/gram packet 1 Packet  1 Packet Topical PRN    sodium chloride (NS) flush 20 mL  20 mL InterCATHeter PRN    sodium chloride (NS) flush 10 mL  10 mL InterCATHeter Q24H    sodium chloride (NS) flush 10 mL  10 mL InterCATHeter PRN    sodium chloride (NS) flush 10 mL  10 mL InterCATHeter Q8H    dronabinol (MARINOL) capsule 2.5 mg  2.5 mg Oral BID    HYDROmorphone (PF) 15 mg/30 ml (DILAUDID) PCA   IntraVENous CONTINUOUS    phenol throat spray (CHLORASEPTIC) 1 Spray  1 Spray Oral PRN    ondansetron (ZOFRAN) injection 4 mg  4 mg IntraVENous Q4H PRN    promethazine (PHENERGAN) 12.5 mg in 0.9% sodium chloride 50 mL IVPB  12.5 mg IntraVENous Q6H PRN    LORazepam (ATIVAN) injection 1 mg  1 mg IntraVENous Q6H PRN    albuterol (PROVENTIL VENTOLIN) nebulizer solution 2.5 mg  2.5 mg Nebulization Q4H PRN       Pt seen and examined  Agree with above  Wound changed already today but she feels like less is coming from the fistula  Less recorded in the canister- She believes more is coming from the ileostomy. Continue TPN  Wound care.    Continue Sips

## 2018-03-22 NOTE — PROGRESS NOTES
Palliative Medicine Consult  [place]: [phone] ([phone])    Patient Name: Khadra Jason  YOB: 1975    Date of Initial Consult: 3/12/18  Reason for Consult: Pain management, chronic and post op   Requesting Provider: Rajni   Primary Care Physician: Elzbieta Nunes MD     SUMMARY:   Khadra Jason is a 38 y.o. with a past history of Crohn's disease (followed by Dr Rima Bobo), mult surgeries s/p total colectomy w/ end ileostomy, 6/2017 ex lap with small bowel perforation, 6/28/17 ex lap, LPA, repair or enterotomy, SMA stent on L, 7/16/2917 ex lap, KATHRINE, fistula exclusion w/ feeding tube placement and prolonged TPN who was admitted on 3/7/2018 from home for planned surgery, s/p ex lap w/ extensive KATHRINE and small bowel fistula take down, wound vac placement. 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 Known to our team during past hospitalizations. Has had lengthy hospital stays w/ mult complications. Has required [facility] stays in past. Has chronic pain from Crohn's disease (and has not been able to tolerate home Crohn's meds recently due to acute issues- had been on steroids, stopped prior to surgery). Discussion of Remicaide in future. Current medical issues leading to Palliative Medicine involvement include: pain management. Patient's mother, Michael Matthews is NOK.  reviewed, no abberrancies- one prescriber for opioids and one pharmacy. Most recently filled 3/9/18: MSContin 100mg tid and Morphine IR 30mg- 8 tabs a day. Has been tried on equivalent dose of Fentanyl patch w/out same benefit. Discussed Methadone but would have to check w/ her prescriber. PALLIATIVE DIAGNOSES:   1. Acute post operative pain in abdomen  2. Chronic abdominal pain from Cronh's disease and hx of surgeries  3. Nausea  4. Anxiety   5. Feeding difficulties due to medical condition- has been on TPN for several months  6. Edema   7.  L ankle pain s/p fall, normal XRs- improving   8. Grief/depressive sx   9. Constipation   10. Opioid tolerance      PLAN:     Abd pain from surgical incision and wound care as well as underlying chronic pain from Crohn's which is worse when off her steroids. Has high opioid tolerance, no aberrancies on . We have done opioid rotation in the hospital for her in past.     1. Maintain current Dilaudid PCA 0.6mg/h and 0.5mg IV every 6 min. 2. Did well w/ trial of morphine IV, so will change Dilaudid 1.5mg IV prn to Morphine 5mg IV every 3h prn severe pain not controlled by PCA. 3. Will change back to home MSContin and removal basal from PCA when surgery gives the okay. 4. Following to assist w/ transition to po and for emotional support as pt needs. Have spoken w/ Dr John Paul Leone (sees pt for her pain) and he is okay w/ continuing home regimen upon discharge if needed. Explained to pt I do not recommend higher doses when she leaves. She will have her mom check how many days of pain pills she has at home and her next appt w/ Dr Tato Plaza. If she needs a few days Rx to bridge her, I can do this for her as know her hx well. 5. Bowel regimen, anti-emetics per surgery. Remains on TPN, octreotide. 6. Communicated plan of care with: Palliative IDT;  Clay Bill RN; Dr Alli Leone / TREATMENT PREFERENCES:     GOALS OF CARE:  Patient/Health Care Proxy Stated Goals:  (Sx management )      TREATMENT PREFERENCES:   Code Status: Full Code    Advance Care Planning:  Advance Care Planning 3/15/2018   Patient's Healthcare Decision Maker is: Named in scanned ACP document   Primary Decision Maker Name Donald Bustamante   Primary Decision Maker Phone Number [phone]   Primary Decision Maker Relationship to Patient Parent   Secondary Decision Maker Name Telly Nath Phone Number [phone]   Secondary Decision Maker Relationship to Patient Unknown   Confirm Advance Directive Yes, on file   Patient Would Like to Complete Advance Directive -   Does the patient have other document types -       Medical Interventions: Full interventions           Other:    As far as possible, the palliative care team has discussed with patient / health care proxy about goals of care / treatment preferences for patient. HISTORY:     Reviewed vitals, I/O's, labs, imaging, MAR, notes- had CT abd/pelvis over weekend. MAR includes   Marinol  Dilaudid 2mg IV every 3h PRN, 3 doses in past 24h  Dilaudid pca, 0.6mg/hr basal, 0.5mg every 6min pca dose- 11mg in past 8h  Ativan 1mg IV prn   Zofran  Compazine  Phenergan  Octreotide. Scopolamine patch        HPI/SUBJECTIVE:    The patient is:   [x] Verbal and participatory  [] Non-participatory due to:     Pain stable in abdomen, had wound care this morning. +ostomy output. No nausea.      Clinical Pain Assessment (nonverbal scale for severity on nonverbal patients):   Clinical Pain Assessment  Severity: 6  Location: Abdomen   Character: Sharp and aching   Duration: chronic and since surgery   Effect: harder to move   Factors: worse w/ movement and wound care  Frequency: constant          Duration: for how long has pt been experiencing pain (e.g., 2 days, 1 month, years)  Frequency: how often pain is an issue (e.g., several times per day, once every few days, constant)     FUNCTIONAL ASSESSMENT:     Palliative Performance Scale (PPS):  PPS: 60       PSYCHOSOCIAL/SPIRITUAL SCREENING:     Palliative IDT has assessed this patient for cultural preferences / practices and a referral made as appropriate to needs (Cultural Services, Patient Advocacy, Ethics, etc.)    Advance Care Planning:  Advance Care Planning 3/15/2018   Patient's Healthcare Decision Maker is: Named in scanned ACP document   Primary Decision Maker Name Sky Child   Primary Decision Maker Phone Number [phone]   Primary Decision Maker Relationship to Patient Parent   Secondary Decision Maker Name Luana Lizarraga Secondary Decision Maker Phone Number [phone]   Secondary Decision Maker Relationship to Patient Unknown   Confirm Advance Directive Yes, on file   Patient Would Like to Complete Advance Directive -   Does the patient have other document types -       Any spiritual / Confucianism concerns:  [] Yes /  [x] No    Caregiver Burnout:  [] Yes /  [x] No /  [] No Caregiver Present      Anticipatory grief assessment:   [x] Normal  / [] Maladaptive       ESAS Anxiety: Anxiety: 1    ESAS Depression: Depression: 2        REVIEW OF SYSTEMS:     Positive and pertinent negative findings in ROS are noted above in HPI. The following systems were [x] reviewed / [] unable to be reviewed as noted in HPI  Other findings are noted below. Systems: constitutional, ears/nose/mouth/throat, respiratory, gastrointestinal, genitourinary, musculoskeletal, integumentary, neurologic, psychiatric, endocrine. Positive findings noted below. Modified ESAS Completed by: provider   Fatigue: 3 Drowsiness: 0   Depression: 2 Pain: 6   Anxiety: 1 Nausea: 1   Anorexia: 8 Dyspnea: 0     Constipation: Yes              PHYSICAL EXAM:     From RN flowsheet:  Wt Readings from Last 3 Encounters:   03/22/18 349 lb 3.3 oz (158.4 kg)   03/05/18 304 lb 3.2 oz (138 kg)   02/28/18 306 lb (138.8 kg)     Blood pressure (P) 99/51, pulse (P) 99, temperature (P) 99 °F (37.2 °C), resp. rate (P) 18, height 5' 4\" (1.626 m), weight 349 lb 3.3 oz (158.4 kg), SpO2 (P) 100 %.     Pain Scale 1: (P) Numeric (0 - 10)  Pain Intensity 1: (P) 3 (dilaudid given for wound care)  Pain Onset 1: Chronic  Pain Location 1: Abdomen  Pain Orientation 1: Anterior  Pain Description 1: Sore  Pain Intervention(s) 1: Encouraged PCA  Last bowel movement, if known: stool output in ostomy     Constitutional: awake, alert, oriented, no sedation or confusion  Eyes: pupils equal, anicteric  ENMT: no nasal discharge, moist mucous membranes  Cardiovascular: regular rhythm  Respiratory: breathing not labored, clear anteriorly   Gastrointestinal: soft , hypoactive bowel sounds, ostomy, midline incisions bandaged    Musculoskeletal: no deformity  Skin: warm, dry, pale  Ext: B/l pitting LE edema  Neurologic: following commands, moving all extremities  Psychiatric: full affect, no hallucinations         HISTORY:     Active Problems:    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)      Past Medical History:   Diagnosis Date    Adverse effect of anesthesia     WOKE DURING SURGERY    Arthritis     Blood clot in vein 2017    STOMACH    Crohn's disease ([address] 8/15/2011    DVT [facility]deep venous thrombosis) [address] [address] 8/15/2011    LEFT LEG    Edema     GENERALIZED R/T TPN; WAIST DOWN    Incarcerated ventral hernia 8/15/2011    Lupus     Lyme disease     Psychiatric disorder     ANXIETY    Right flank pain 8/22/2011    Seizures ([facility] [address] 1990    LAST AT AGE 17    Stroke [facility]) 1990    age 17; A WEEK POST OP, HAD SEIZURES AND STROKE, WAS IN COMA FOR A MONTH    Thyroid disease     HYPO-NO MEDS      Past Surgical History:   Procedure Laterality Date    HX APPENDECTOMY      HX GYN  2015    HIDRADENTIS LABIA    HX OTHER SURGICAL      multiple procedures related to her Crohn's disease    HX OTHER SURGICAL      ABDOMINAL SURGERY REMOVING COLON, 2/3 STOMACH, 1/3 SMALL INTESTINE    TX LAP, INCISIONAL HERNIA REPAIR,INCARCERATED  9-10-08    dr. Ysabel Livingston      Family History   Problem Relation Age of Onset    Hypertension Father     Stroke Father     Other Father      arthritis    Arthritis-osteo Father     Hypertension Mother     Arthritis-osteo Mother     Anesth Problems Neg Hx       History reviewed, no pertinent family history.   Social History   Substance Use Topics    Smoking status: Former Smoker     Packs/day: 1.00     Years: 16.00     Quit date: 2/27/2017    Smokeless tobacco: Former User      Comment: OFF AND ON    Alcohol use No     Allergies   Allergen Reactions    Sulfa (Sulfonamide Antibiotics) Anaphylaxis    Demerol [Meperidine] Rash    Soma [Carisoprodol] Rash and Nausea Only    Toradol [Ketorolac Tromethamine] Nausea and Vomiting      Current Facility-Administered Medications   Medication Dose Route Frequency    TPN ADULT - CENTRAL   IntraVENous CONTINUOUS    furosemide (LASIX) injection 20 mg  20 mg IntraVENous DAILY    aspirin (ASPIRIN) tablet 325 mg  325 mg Oral DAILY    TPN ADULT - CENTRAL   IntraVENous CONTINUOUS    0.9% sodium chloride infusion 250 mL  250 mL IntraVENous PRN    acetaminophen (TYLENOL) tablet 650 mg  650 mg Oral Q6H PRN    diphenhydrAMINE (BENADRYL) capsule 25 mg  25 mg Oral Q6H PRN    enoxaparin (LOVENOX) injection 40 mg  40 mg SubCUTAneous Q24H    piperacillin-tazobactam (ZOSYN) 3.375 g in 0.9% sodium chloride (MBP/ADV) 100 mL  3.375 g IntraVENous Q8H    octreotide (SANDOSTATIN) injection 50 mcg  50 mcg IntraVENous TID    fat emulsion 20% (LIPOSYN, INTRAlipid) infusion 250 mL  250 mL IntraVENous Q MON, WED & FRI    HYDROmorphone (DILAUDID) injection 1.5 mg  1.5 mg IntraVENous Q3H PRN    0.9% sodium chloride infusion 250 mL  250 mL IntraVENous PRN    0.9% sodium chloride infusion 250 mL  250 mL IntraVENous PRN    nystatin (MYCOSTATIN) 100,000 unit/gram cream   Topical BID    prochlorperazine (COMPAZINE) with saline injection 5 mg  5 mg IntraVENous Q6H PRN    naloxone (NARCAN) injection 0.4 mg  0.4 mg IntraVENous EVERY 2 MINUTES AS NEEDED    0.9% sodium chloride infusion 250 mL  250 mL IntraVENous PRN    glucose chewable tablet 16 g  4 Tab Oral PRN    dextrose (D50W) injection syrg 12.5-25 g  12.5-25 g IntraVENous PRN    glucagon (GLUCAGEN) injection 1 mg  1 mg IntraMUSCular PRN    insulin lispro (HUMALOG) injection   SubCUTAneous Q6H    scopolamine (TRANSDERM-SCOP) 1 mg over 3 days 1 Patch  1 Patch TransDERmal Q72H    sodium chloride (NS) flush 20 mL  20 mL InterCATHeter PRN    sodium chloride (NS) flush 10 mL  10 mL InterCATHeter Q24H    sodium chloride (NS) flush 10 mL  10 mL InterCATHeter PRN    sodium chloride (NS) flush 10 mL  10 mL InterCATHeter Q8H    alteplase (CATHFLO) 1 mg in sterile water (preservative free) 1 mL injection  1 mg InterCATHeter PRN    bacitracin 500 unit/gram packet 1 Packet  1 Packet Topical PRN    sodium chloride (NS) flush 20 mL  20 mL InterCATHeter PRN    sodium chloride (NS) flush 10 mL  10 mL InterCATHeter Q24H    sodium chloride (NS) flush 10 mL  10 mL InterCATHeter PRN    sodium chloride (NS) flush 10 mL  10 mL InterCATHeter Q8H    dronabinol (MARINOL) capsule 2.5 mg  2.5 mg Oral BID    HYDROmorphone (PF) 15 mg/30 ml (DILAUDID) PCA   IntraVENous CONTINUOUS    phenol throat spray (CHLORASEPTIC) 1 Spray  1 Spray Oral PRN    ondansetron (ZOFRAN) injection 4 mg  4 mg IntraVENous Q4H PRN    promethazine (PHENERGAN) 12.5 mg in 0.9% sodium chloride 50 mL IVPB  12.5 mg IntraVENous Q6H PRN    LORazepam (ATIVAN) injection 1 mg  1 mg IntraVENous Q6H PRN    albuterol (PROVENTIL VENTOLIN) nebulizer solution 2.5 mg  2.5 mg Nebulization Q4H PRN          LAB AND IMAGING FINDINGS:     Lab Results   Component Value Date/Time    WBC 13.5 (H) 03/22/2018 12:05 AM    HGB 8.4 (L) 03/22/2018 12:05 AM    PLATELET 613 75/57/1946 12:05 AM     Lab Results   Component Value Date/Time    Sodium 135 (L) 03/22/2018 12:05 AM    Potassium 4.1 03/22/2018 12:05 AM    Chloride 103 03/22/2018 12:05 AM    CO2 23 03/22/2018 12:05 AM    BUN 16 03/22/2018 12:05 AM    Creatinine 0.64 03/22/2018 12:05 AM    Calcium 7.7 (L) 03/22/2018 12:05 AM    Magnesium 1.8 03/22/2018 12:05 AM    Phosphorus 4.5 03/22/2018 12:05 AM      Lab Results   Component Value Date/Time    AST (SGOT) 7 (L) 03/17/2018 03:11 AM    Alk.  phosphatase 77 03/17/2018 03:11 AM    Protein, total 5.1 (L) 03/17/2018 03:11 AM    Albumin 1.6 (L) 03/17/2018 03:11 AM    Globulin 3.5 03/17/2018 03:11 AM     Lab Results   Component Value Date/Time    INR 1.0 12/07/2017 08:37 AM    Prothrombin time 10.0 12/07/2017 08:37 AM No results found for: IRON, FE, TIBC, IBCT, PSAT, FERR   No results found for: PH, PCO2, PO2  No components found for: GLPOC   No results found for: CPK, CKMB             Total time:   Counseling / coordination time, spent as noted above:   > 50% counseling / coordination?:     Prolonged service was provided for  []30 min   []75 min in face to face time in the presence of the patient, spent as noted above. Time Start:   Time End:   Note: this can only be billed with 91999 (initial) or 31146 (follow up). If multiple start / stop times, list each separately.

## 2018-03-22 NOTE — PROGRESS NOTES
Received with call light on and mobility team at door. Mobility team, nurse and patient requiring assistance of third person for multiple line/lead management and transfer from bed to bed/chair position. Recommend 4 people for safety.

## 2018-03-22 NOTE — PROGRESS NOTES
Bedside shift change report given to MAURI Chacko (oncoming nurse) by [facility], RN (offgoing nurse). Report included the following information SBAR, OR Summary, MAR, Recent Results and Cardiac Rhythm sinus tach.

## 2018-03-22 NOTE — PROGRESS NOTES
Patient discussed during IDR, She remains on TPN, Lipids and Octreotide. Patient's blood sugar remained elevated. Palliative Care following for pain management and transition to home po pain meds. Ostomy active and drains to wound draining dark greenish fluid. Daily dressing to wound. Patient working with PT/OT. Remains on PCA pump for pain. Disposition plan will be determined when patient is medically stable.  Sanjeev Heath MSA, MAURI,CRM

## 2018-03-23 LAB
ALBUMIN SERPL-MCNC: 1 G/DL (ref 3.5–5)
ALBUMIN/GLOB SERPL: 0.2 {RATIO} (ref 1.1–2.2)
ALP SERPL-CCNC: 92 U/L (ref 45–117)
ALT SERPL-CCNC: 11 U/L (ref 12–78)
ANION GAP SERPL CALC-SCNC: 9 MMOL/L (ref 5–15)
AST SERPL-CCNC: 10 U/L (ref 15–37)
BILIRUB SERPL-MCNC: 0.4 MG/DL (ref 0.2–1)
BUN SERPL-MCNC: 17 MG/DL (ref 6–20)
BUN/CREAT SERPL: 26 (ref 12–20)
CALCIUM SERPL-MCNC: 8.3 MG/DL (ref 8.5–10.1)
CHLORIDE SERPL-SCNC: 107 MMOL/L (ref 97–108)
CO2 SERPL-SCNC: 25 MMOL/L (ref 21–32)
CREAT SERPL-MCNC: 0.66 MG/DL (ref 0.55–1.02)
ERYTHROCYTE [DISTWIDTH] IN BLOOD BY AUTOMATED COUNT: 17.5 % (ref 11.5–14.5)
GLOBULIN SER CALC-MCNC: 5 G/DL (ref 2–4)
GLUCOSE BLD STRIP.AUTO-MCNC: 119 MG/DL (ref 65–100)
GLUCOSE BLD STRIP.AUTO-MCNC: 133 MG/DL (ref 65–100)
GLUCOSE BLD STRIP.AUTO-MCNC: 159 MG/DL (ref 65–100)
GLUCOSE BLD STRIP.AUTO-MCNC: 174 MG/DL (ref 65–100)
GLUCOSE SERPL-MCNC: 134 MG/DL (ref 65–100)
HCT VFR BLD AUTO: 25.3 % (ref 35–47)
HGB BLD-MCNC: 8.2 G/DL (ref 11.5–16)
MAGNESIUM SERPL-MCNC: 1.9 MG/DL (ref 1.6–2.4)
MCH RBC QN AUTO: 30.3 PG (ref 26–34)
MCHC RBC AUTO-ENTMCNC: 32.4 G/DL (ref 30–36.5)
MCV RBC AUTO: 93.4 FL (ref 80–99)
NRBC # BLD: 0.06 K/UL (ref 0–0.01)
NRBC BLD-RTO: 0.5 PER 100 WBC
PHOSPHATE SERPL-MCNC: 4.7 MG/DL (ref 2.6–4.7)
PLATELET # BLD AUTO: 201 K/UL (ref 150–400)
PMV BLD AUTO: 10.7 FL (ref 8.9–12.9)
POTASSIUM SERPL-SCNC: 3.7 MMOL/L (ref 3.5–5.1)
PROT SERPL-MCNC: 6 G/DL (ref 6.4–8.2)
RBC # BLD AUTO: 2.71 M/UL (ref 3.8–5.2)
SERVICE CMNT-IMP: ABNORMAL
SODIUM SERPL-SCNC: 141 MMOL/L (ref 136–145)
WBC # BLD AUTO: 12.6 K/UL (ref 3.6–11)

## 2018-03-23 PROCEDURE — 74011250636 HC RX REV CODE- 250/636: Performed by: NURSE PRACTITIONER

## 2018-03-23 PROCEDURE — 74011250636 HC RX REV CODE- 250/636: Performed by: EMERGENCY MEDICINE

## 2018-03-23 PROCEDURE — 74011250636 HC RX REV CODE- 250/636: Performed by: PHYSICAL MEDICINE & REHABILITATION

## 2018-03-23 PROCEDURE — 74011000258 HC RX REV CODE- 258: Performed by: SURGERY

## 2018-03-23 PROCEDURE — 85027 COMPLETE CBC AUTOMATED: CPT | Performed by: SURGERY

## 2018-03-23 PROCEDURE — 77030018836 HC SOL IRR NACL ICUM -A

## 2018-03-23 PROCEDURE — 82962 GLUCOSE BLOOD TEST: CPT

## 2018-03-23 PROCEDURE — 97110 THERAPEUTIC EXERCISES: CPT

## 2018-03-23 PROCEDURE — 83735 ASSAY OF MAGNESIUM: CPT | Performed by: SURGERY

## 2018-03-23 PROCEDURE — 74011000250 HC RX REV CODE- 250: Performed by: SURGERY

## 2018-03-23 PROCEDURE — 65660000000 HC RM CCU STEPDOWN

## 2018-03-23 PROCEDURE — 80053 COMPREHEN METABOLIC PANEL: CPT | Performed by: NURSE PRACTITIONER

## 2018-03-23 PROCEDURE — 3331090001 HH PPS REVENUE CREDIT

## 2018-03-23 PROCEDURE — 74011250636 HC RX REV CODE- 250/636: Performed by: SURGERY

## 2018-03-23 PROCEDURE — 74011250637 HC RX REV CODE- 250/637: Performed by: NURSE PRACTITIONER

## 2018-03-23 PROCEDURE — 3331090002 HH PPS REVENUE DEBIT

## 2018-03-23 PROCEDURE — 74011636637 HC RX REV CODE- 636/637: Performed by: SURGERY

## 2018-03-23 PROCEDURE — 36415 COLL VENOUS BLD VENIPUNCTURE: CPT | Performed by: SURGERY

## 2018-03-23 PROCEDURE — 74011250637 HC RX REV CODE- 250/637: Performed by: PHYSICIAN ASSISTANT

## 2018-03-23 PROCEDURE — 84100 ASSAY OF PHOSPHORUS: CPT | Performed by: SURGERY

## 2018-03-23 RX ADMIN — DRONABINOL 2.5 MG: 2.5 CAPSULE ORAL at 18:23

## 2018-03-23 RX ADMIN — INSULIN LISPRO 3 UNITS: 100 INJECTION, SOLUTION INTRAVENOUS; SUBCUTANEOUS at 12:39

## 2018-03-23 RX ADMIN — OCTREOTIDE ACETATE 50 MCG: 50 INJECTION, SOLUTION INTRAVENOUS; SUBCUTANEOUS at 18:23

## 2018-03-23 RX ADMIN — NYSTATIN: 100000 CREAM TOPICAL at 18:43

## 2018-03-23 RX ADMIN — Medication: at 12:18

## 2018-03-23 RX ADMIN — OCTREOTIDE ACETATE 50 MCG: 50 INJECTION, SOLUTION INTRAVENOUS; SUBCUTANEOUS at 22:09

## 2018-03-23 RX ADMIN — ASCORBIC ACID: 500 INJECTION, SOLUTION INTRAMUSCULAR; INTRAVENOUS; SUBCUTANEOUS at 18:33

## 2018-03-23 RX ADMIN — MORPHINE SULFATE 5 MG: 4 INJECTION, SOLUTION INTRAMUSCULAR; INTRAVENOUS at 21:54

## 2018-03-23 RX ADMIN — FUROSEMIDE 20 MG: 10 INJECTION, SOLUTION INTRAMUSCULAR; INTRAVENOUS at 09:30

## 2018-03-23 RX ADMIN — I.V. FAT EMULSION 250 ML: 20 EMULSION INTRAVENOUS at 18:32

## 2018-03-23 RX ADMIN — ONDANSETRON HYDROCHLORIDE 4 MG: 2 INJECTION INTRAMUSCULAR; INTRAVENOUS at 18:22

## 2018-03-23 RX ADMIN — NYSTATIN: 100000 CREAM TOPICAL at 10:36

## 2018-03-23 RX ADMIN — DRONABINOL 2.5 MG: 2.5 CAPSULE ORAL at 09:30

## 2018-03-23 RX ADMIN — Medication 10 ML: at 09:31

## 2018-03-23 RX ADMIN — Medication 10 ML: at 06:00

## 2018-03-23 RX ADMIN — Medication 10 ML: at 21:49

## 2018-03-23 RX ADMIN — ASPIRIN 325 MG: 325 TABLET ORAL at 09:30

## 2018-03-23 RX ADMIN — ENOXAPARIN SODIUM 40 MG: 100 INJECTION SUBCUTANEOUS at 09:29

## 2018-03-23 RX ADMIN — MORPHINE SULFATE 5 MG: 4 INJECTION, SOLUTION INTRAMUSCULAR; INTRAVENOUS at 11:00

## 2018-03-23 RX ADMIN — INSULIN LISPRO 3 UNITS: 100 INJECTION, SOLUTION INTRAVENOUS; SUBCUTANEOUS at 00:00

## 2018-03-23 RX ADMIN — OCTREOTIDE ACETATE 50 MCG: 50 INJECTION, SOLUTION INTRAVENOUS; SUBCUTANEOUS at 09:29

## 2018-03-23 RX ADMIN — PIPERACILLIN AND TAZOBACTAM 3.38 G: 3; .375 INJECTION, POWDER, FOR SOLUTION INTRAVENOUS at 05:00

## 2018-03-23 RX ADMIN — Medication 10 ML: at 09:30

## 2018-03-23 NOTE — PROGRESS NOTES
Problem: Falls - Risk of  Goal: *Absence of Falls  Document Marlene Fall Risk and appropriate interventions in the flowsheet.    Outcome: Progressing Towards Goal  Fall Risk Interventions:  Mobility Interventions: Communicate number of staff needed for ambulation/transfer         Medication Interventions: Teach patient to arise slowly    Elimination Interventions: Call light in reach, Urinal in reach    History of Falls Interventions: Consult care management for discharge planning, Evaluate medications/consider consulting pharmacy

## 2018-03-23 NOTE — PROGRESS NOTES
03/22/18 2006   Vitals   Temp 99.3 °F (37.4 °C)   Temp Source Oral   Pulse (Heart Rate) (!) 119   Heart Rate Source Monitor   Resp Rate 18   O2 Sat (%) 100 %   Level of Consciousness Alert   BP 97/44   MAP (Calculated) (!) 62   BP 1 Location Left arm   BP 1 Method Automatic   BP Patient Position At rest   Cardiac Rhythm NSR;Sinus Tach   MEWS Score 4   Box Number 433   Electrodes Replaced No     Patient is baseline sinus tach. Patient is asymptomatic. Will continue to monitor.

## 2018-03-23 NOTE — PROGRESS NOTES
No specific complaints this AM.  Tm 100.3 Tc 98.3 HR: 97 BP: 94/56 Resp Rate: 18 100% sat on room air. Drain Output - 150ml for 24 hours. Intake/Output Summary (Last 24 hours) at 03/23/18 0943  Last data filed at 03/23/18 0457   Gross per 24 hour   Intake          2621.05 ml   Output             2050 ml   Net           571.05 ml   Exam: Cor: RRR. Lungs: Bilateral breath sounds. Clear to auscultation. Abd: Soft. Tender. Dressing dry. Ileostomy working. Fistula well controlled.   Labs:   Recent Results (from the past 12 hour(s))   GLUCOSE, POC    Collection Time: 03/23/18 12:01 AM   Result Value Ref Range    Glucose (POC) 159 (H) 65 - 100 mg/dL    Performed by Daisy Elkins    CBC W/O DIFF    Collection Time: 03/23/18  5:36 AM   Result Value Ref Range    WBC 12.6 (H) 3.6 - 11.0 K/uL    RBC 2.71 (L) 3.80 - 5.20 M/uL    HGB 8.2 (L) 11.5 - 16.0 g/dL    HCT 25.3 (L) 35.0 - 47.0 %    MCV 93.4 80.0 - 99.0 FL    MCH 30.3 26.0 - 34.0 PG    MCHC 32.4 30.0 - 36.5 g/dL    RDW 17.5 (H) 11.5 - 14.5 %    PLATELET 236 277 - 837 K/uL    MPV 10.7 8.9 - 12.9 FL    NRBC 0.5 (H) 0  WBC    ABSOLUTE NRBC 0.06 (H) 0.00 - 0.01 K/uL   MAGNESIUM    Collection Time: 03/23/18  5:36 AM   Result Value Ref Range    Magnesium 1.9 1.6 - 2.4 mg/dL   PHOSPHORUS    Collection Time: 03/23/18  5:36 AM   Result Value Ref Range    Phosphorus 4.7 2.6 - 4.7 MG/DL   METABOLIC PANEL, COMPREHENSIVE    Collection Time: 03/23/18  5:36 AM   Result Value Ref Range    Sodium 141 136 - 145 mmol/L    Potassium 3.7 3.5 - 5.1 mmol/L    Chloride 107 97 - 108 mmol/L    CO2 25 21 - 32 mmol/L    Anion gap 9 5 - 15 mmol/L    Glucose 134 (H) 65 - 100 mg/dL    BUN 17 6 - 20 MG/DL    Creatinine 0.66 0.55 - 1.02 MG/DL    BUN/Creatinine ratio 26 (H) 12 - 20      GFR est AA >60 >60 ml/min/1.73m2    GFR est non-AA >60 >60 ml/min/1.73m2    Calcium 8.3 (L) 8.5 - 10.1 MG/DL    Bilirubin, total 0.4 0.2 - 1.0 MG/DL    ALT (SGPT) 11 (L) 12 - 78 U/L    AST (SGOT) 10 (L) 15 - 37 U/L    Alk. phosphatase 92 45 - 117 U/L    Protein, total 6.0 (L) 6.4 - 8.2 g/dL    Albumin 1.0 (L) 3.5 - 5.0 g/dL    Globulin 5.0 (H) 2.0 - 4.0 g/dL    A-G Ratio 0.2 (L) 1.1 - 2.2     GLUCOSE, POC    Collection Time: 03/23/18  5:36 AM   Result Value Ref Range    Glucose (POC) 119 (H) 65 - 100 mg/dL    Performed by Mary Jo ZELAYA    NPO except sips of clear liquids. Renew TPN and Lipids. Will change TPN to cyclic. Increase insulin in TPN. Continue Octreotide - 50 micrograms TID. Stop IV abx. Palliative Care input - noted. PT following.

## 2018-03-23 NOTE — PROGRESS NOTES
Problem: Mobility Impaired (Adult and Pediatric)  Goal: *Acute Goals and Plan of Care (Insert Text)  Physical Therapy Goals  Revisited 3/19/2018, goals remain appropriate, carry over    Physical Therapy Goals  Initiated 3/8/2018  1. Patient will move from supine to sit and sit to supine  in bed with moderate assistance  within 7 day(s). 2.  Patient will transfer from bed to chair and chair to bed with moderate assistance  using the least restrictive device within 7 day(s). 3.  Patient will perform sit to stand with moderate assistance  within 7 day(s). 4.  PT will assess gait with the least restrictive device within 7 day(s). physical Therapy TREATMENT  Patient: Genna Salas (38 y.o. 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 ASSESSMENT:  Pt seen this afternoon following RN clearance and hold from AM for pain meds and wound attention. Pt in bed in NAD upon arrival, initially trying to defer 2* abdominal pain. With gentle encouragement, pt agreeable to bed therex. Pt demonstrates improved LE ROM and muscle recruitment with decreased edema. Pt admits that her LEs are looking and feeling better with \"water pill. \"  Pt completed therex with set-up only and cues (hip abduction with AAROM assit only). Discussed pt's need to get to chair DAILY and the benefits of advocating for herself. Explained benefits of \"normalizing\" this behavior and working towards decreasing anxiety associated with movement and manipulation. Finally, explained benefits of postural strengthening in chair vs bed in chair position for pt and her mother. Both report understanding. Discussed transfer training with pt and she would feel more comfortable attempting her first sit to stand from bed in chair position. Assessed bed and pt reports bed does go completely into chair position with feet flat on floor-recommend trying this next session with assist x2 at bariatric RW. Should bed not go into full chair-may need to trial sit to stand at [address] with pt pulling up and feet blocked on platform. Outside PT sessions, recommend pt get to stretcher chair with mobility team on their rounding and back when they return for rounding. Discussed with CM who will recommend to RN that this gets placed as an order. Progression toward goals:  []    Improving appropriately and progressing toward goals  [x]    Improving slowly and progressing toward goals  []    Not making progress toward goals and plan of care will be adjusted     PLAN:  Patient continues to benefit from skilled intervention to address the above impairments. Continue treatment per established plan of care. Discharge Recommendations:  Rehab  Further Equipment Recommendations for Discharge:  NA     SUBJECTIVE:   Patient stated Lashonda Sarwat we do something later?     OBJECTIVE DATA SUMMARY:   Critical Behavior:  Neurologic State: Alert  Orientation Level: Oriented X4  Cognition: Appropriate decision making, Appropriate for age attention/concentration, Appropriate safety awareness, Follows commands   Therapeutic Exercises:     EXERCISE   Sets   Reps   Active Active Assist   Passive   Comments   SAQ 1 10 [x]                        []                        []                           Heel raises on footboard 1 10 [x]                        []                        []                        Footboard approximated    Leg presses on footboard 1 10 [x]                        []                        []                        \"   Heel slides 1 10 [x]                        []                        []                           Hip abduction 1 10 []                        [x]                        []                           Glut sets 1 10 [x]                        []                        []                               Pain:  Pain Scale 1: Numeric (0 - 10)  Pain Intensity 1: 8   Pain Intervention(s) 1: Medication (see MAR)  Activity Tolerance:   NAD; limited by c/o abdominal pain  After treatment:   []    Patient left in no apparent distress sitting up in chair  [x]    Patient left in no apparent distress in bed  [x]    Call bell left within reach  [x]    Nursing notified  [x]    Caregiver present  []    Bed alarm activated    COMMUNICATION/COLLABORATION:   The patients plan of care was discussed with: Registered Nurse    Judy Llamas   Time Calculation: 19 mins

## 2018-03-23 NOTE — ROUTINE PROCESS
Bedside and Verbal shift change report given to Skolegyralph Spence (oncoming nurse) by Dorene Mckeon (offgoing nurse). Report included the following information SBAR, Kardex, MAR, Accordion, Med Rec Status and Cardiac Rhythm ST/Sr.

## 2018-03-23 NOTE — PROGRESS NOTES
Palliative Medicine Consult  [place]: [phone] ([phone])    Patient Name: Billy Chandler  YOB: 1975    Date of Initial Consult: 3/12/18  Reason for Consult: Pain management, chronic and post op   Requesting Provider: Rajni   Primary Care Physician: Samuel Royal MD     SUMMARY:   Billy Chandler is a 38 y.o. with a past history of Crohn's disease (followed by Dr Virginia Puga), mult surgeries s/p total colectomy w/ end ileostomy, 6/2017 ex lap with small bowel perforation, 6/28/17 ex lap, LPA, repair or enterotomy, SMA stent on L, 7/16/2917 ex lap, KATHRINE, fistula exclusion w/ feeding tube placement and prolonged TPN who was admitted on 3/7/2018 from home for planned surgery, s/p ex lap w/ extensive KATHRINE and small bowel fistula take down, wound vac placement. 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 Known to our team during past hospitalizations. Has had lengthy hospital stays w/ mult complications. Has required [facility] stays in past. Has chronic pain from Crohn's disease (and has not been able to tolerate home Crohn's meds recently due to acute issues- had been on steroids, stopped prior to surgery). Discussion of Remicaide in future. Current medical issues leading to Palliative Medicine involvement include: pain management. Patient's mother, Dorita Perdomo is NOK.  reviewed, no abberrancies- one prescriber for opioids and one pharmacy. Most recently filled 3/9/18: MSContin 100mg tid and Morphine IR 30mg- 8 tabs a day. Has been tried on equivalent dose of Fentanyl patch w/out same benefit. Discussed Methadone but would have to check w/ her prescriber. PALLIATIVE DIAGNOSES:   1. Acute post operative pain in abdomen  2. Chronic abdominal pain from Cronh's disease and hx of surgeries  3. Nausea  4. Anxiety   5. Feeding difficulties due to medical condition- has been on TPN for several months  6. Edema   7.  L ankle pain s/p fall, normal XRs- improving   8. Grief/depressive sx   9. Constipation   10. Opioid tolerance      PLAN:     Abd pain from surgical incision and wound care as well as underlying chronic pain from Crohn's which is worse when off her steroids. Has high opioid tolerance, no aberrancies on . We have done opioid rotation in the hospital for her in past.     1. Maintain current Dilaudid PCA 0.6mg/h and 0.5mg IV every 6 min. 2. Continue morphine 5mg IV q3h for severe pain; can use if SBP > 90; if SBP < 90, would recheck a few times, if persistently low would consider notifying primary service  3. Will change back to home MSContin and removal basal from PCA when surgery gives the okay. 4. Following to assist w/ transition to po and for emotional support as pt needs. PC has spoken w/ Dr Fransisco Orta (sees pt for her pain) and he is okay w/ continuing home regimen upon discharge if needed. Dr. Jacki Fox explained to pt she does not recommend higher doses when she leaves. She will have her mom check how many days of pain pills she has at home and her next appt w/ Dr Virginia Ortiz. If she needs a few days Rx to bridge her, PC can do this for her as know her hx well. 5. Bowel regimen, anti-emetics per surgery. Remains on TPN, octreotide.    6. Communicated plan of care with: Palliative IDT; RN       GOALS OF CARE / TREATMENT PREFERENCES:     GOALS OF CARE:  Patient/Health Care Proxy Stated Goals:  (Sx management )      TREATMENT PREFERENCES:   Code Status: Full Code    Advance Care Planning:  Advance Care Planning 3/15/2018   Patient's Healthcare Decision Maker is: Named in scanned ACP document   Primary Decision Maker Name Randall Stevens   Primary Decision Maker Phone Number [phone]   Primary Decision Maker Relationship to Patient Parent   Secondary Decision Maker Name Telly Nath Phone Number [phone]   Secondary Decision Maker Relationship to Patient Unknown   Confirm Advance Directive Yes, on file Patient Would Like to Complete Advance Directive -   Does the patient have other document types -       Medical Interventions: Full interventions           Other:    As far as possible, the palliative care team has discussed with patient / health care proxy about goals of care / treatment preferences for patient. HISTORY:     Reviewed vitals, I/O's, labs, imaging, MAR, notes  Tmax 100.3, 's at times, on room air, \"alert\"  200 PO 3/22, TPN, IVF, zosyn  1900 UOP, no ostomy output per I/o  Na and cr wnl, lft not elevated, albumin 1      MAR includes   Tylenol, 1 dose 3/22 and 3/21  Albuterol prn, no recent dose  Asa 325mg  Benadryl, no recent dose  Lasix 20mg iv daily  Marinol  Dilaudid pca, 0.6mg/hr basal, 0.5mg every 6min pca dose  Morphine 5mg IV q3h PRN, 2 dose 3/22  Ativan 1mg IV prn, 1 dose 3/21  Zofran, no recent dose  Compazine, no recent dose  Phenergan, no recent dose  Octreotide. Scopolamine patch        HPI/SUBJECTIVE:    The patient is:   [x] Verbal and participatory  [] Non-participatory due to:     Pain severe this morning during my visit -- pca has run out, awaiting new cartridge. RN getting PRN morphine dose now. No nausea, no sob.     Clinical Pain Assessment (nonverbal scale for severity on nonverbal patients):   Clinical Pain Assessment  Severity: 8  Location: Abdomen   Character: Sharp and aching   Duration: chronic and since surgery   Effect: harder to move   Factors: worse w/ movement and wound care  Frequency: constant          Duration: for how long has pt been experiencing pain (e.g., 2 days, 1 month, years)  Frequency: how often pain is an issue (e.g., several times per day, once every few days, constant)     FUNCTIONAL ASSESSMENT:     Palliative Performance Scale (PPS):  PPS: 60       PSYCHOSOCIAL/SPIRITUAL SCREENING:     Palliative IDT has assessed this patient for cultural preferences / practices and a referral made as appropriate to needs (Cultural Services, Patient Advocacy, Ethics, etc.)    Advance Care Planning:  Advance Care Planning 3/15/2018   Patient's Healthcare Decision Maker is: Named in scanned ACP document   Primary Decision Maker Name Derrick Redding   Primary Decision Maker Phone Number [phone]   Primary Decision Maker Relationship to Patient Parent   Secondary Decision Maker Name Telly Nath Phone Number [phone]   Secondary Decision Maker Relationship to Patient Unknown   Confirm Advance Directive Yes, on file   Patient Would Like to Complete Advance Directive -   Does the patient have other document types -       Any spiritual / Gnosticist concerns:  [] Yes /  [x] No    Caregiver Burnout:  [] Yes /  [x] No /  [] No Caregiver Present      Anticipatory grief assessment:   [x] Normal  / [] Maladaptive       ESAS Anxiety: Anxiety: 1    ESAS Depression: Depression: 2        REVIEW OF SYSTEMS:     Positive and pertinent negative findings in ROS are noted above in HPI. The following systems were [x] reviewed / [] unable to be reviewed as noted in HPI  Other findings are noted below. Systems: constitutional, ears/nose/mouth/throat, respiratory, gastrointestinal, genitourinary, musculoskeletal, integumentary, neurologic, psychiatric, endocrine. Positive findings noted below. Modified ESAS Completed by: provider   Fatigue: 3 Drowsiness: 0   Depression: 2 Pain: 8   Anxiety: 1 Nausea: 0   Anorexia: 8 Dyspnea: 0     Constipation: Yes              PHYSICAL EXAM:     From RN flowsheet:  Wt Readings from Last 3 Encounters:   03/23/18 153.2 kg (337 lb 11.9 oz)   03/05/18 138 kg (304 lb 3.2 oz)   02/28/18 138.8 kg (306 lb)     Blood pressure 95/54, pulse 97, temperature 98.3 °F (36.8 °C), resp. rate 18, height 5' 4\" (1.626 m), weight 153.2 kg (337 lb 11.9 oz), SpO2 99 %.     Pain Scale 1: Numeric (0 - 10)  Pain Intensity 1: 8  Pain Onset 1: chronic  Pain Location 1: Abdomen  Pain Orientation 1: Medial  Pain Description 1: Aching, Constant  Pain Intervention(s) 1: Medication (see MAR)  Last bowel movement, if known: stool output in ostomy     Constitutional: awake, alert, oriented, no sedation or confusion  Eyes: pupils equal, anicteric  ENMT: no nasal discharge, moist mucous membranes  Cardiovascular: regular rhythm  Respiratory: breathing not labored, clear anteriorly   Gastrointestinal: soft , hypoactive bowel sounds, ostomy, midline incisions bandaged    Musculoskeletal: no deformity  Skin: warm, dry, pale  Ext: B/l pitting LE edema  Neurologic: following commands, moving all extremities  Psychiatric: full affect, no hallucinations         HISTORY:     Active Problems:    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)      Past Medical History:   Diagnosis Date    Adverse effect of anesthesia     WOKE DURING SURGERY    Arthritis     Blood clot in vein 2017    STOMACH    Crohn's disease ([address] 8/15/2011    DVT (deep venous thrombosis) [address] [address] 8/15/2011    LEFT LEG    Edema     GENERALIZED R/T TPN; WAIST DOWN    Incarcerated ventral hernia 8/15/2011    Lupus     Lyme disease     Psychiatric disorder     ANXIETY    Right flank pain 8/22/2011    Seizures ([facility] [address] 1990    LAST AT AGE 17    Stroke [facility]) 1990    age 17;  A WEEK POST OP, HAD SEIZURES AND STROKE, WAS IN COMA FOR A MONTH    Thyroid disease     HYPO-NO MEDS      Past Surgical History:   Procedure Laterality Date    HX APPENDECTOMY      HX GYN  2015    HIDRADENTIS LABIA    HX OTHER SURGICAL      multiple procedures related to her Crohn's disease    HX OTHER SURGICAL      ABDOMINAL SURGERY REMOVING COLON, 2/3 STOMACH, 1/3 SMALL INTESTINE    [place] LAP, INCISIONAL HERNIA REPAIR,INCARCERATED  9-10-08    dr. Hever Thompson      Family History   Problem Relation Age of Onset    Hypertension Father     Stroke Father     Other Father      arthritis    Arthritis-osteo Father     Hypertension Mother     Arthritis-osteo Mother     Anesth Problems Neg Hx       History reviewed, no pertinent family history.   Social History   Substance Use Topics    Smoking status: Former Smoker     Packs/day: 1.00     Years: 16.00     Quit date: 2/27/2017    Smokeless tobacco: Former User      Comment: OFF AND ON    Alcohol use No     Allergies   Allergen Reactions    Sulfa (Sulfonamide Antibiotics) Anaphylaxis    Demerol [Meperidine] Rash    Soma [Carisoprodol] Rash and Nausea Only    Toradol [Ketorolac Tromethamine] Nausea and Vomiting      Current Facility-Administered Medications   Medication Dose Route Frequency    TPN ADULT - CENTRAL   IntraVENous CONTINUOUS    TPN ADULT - CENTRAL   IntraVENous CONTINUOUS    furosemide (LASIX) injection 20 mg  20 mg IntraVENous DAILY    aspirin (ASPIRIN) tablet 325 mg  325 mg Oral DAILY    HYDROmorphone (PF) 25 mg/50 mL (DILAUDID) PCA   IntraVENous CONTINUOUS    morphine injection 5 mg  5 mg IntraVENous Q3H PRN    0.9% sodium chloride infusion 250 mL  250 mL IntraVENous PRN    acetaminophen (TYLENOL) tablet 650 mg  650 mg Oral Q6H PRN    diphenhydrAMINE (BENADRYL) capsule 25 mg  25 mg Oral Q6H PRN    enoxaparin (LOVENOX) injection 40 mg  40 mg SubCUTAneous Q24H    octreotide (SANDOSTATIN) injection 50 mcg  50 mcg IntraVENous TID    fat emulsion 20% (LIPOSYN, INTRAlipid) infusion 250 mL  250 mL IntraVENous Q MON, WED & FRI    0.9% sodium chloride infusion 250 mL  250 mL IntraVENous PRN    0.9% sodium chloride infusion 250 mL  250 mL IntraVENous PRN    nystatin (MYCOSTATIN) 100,000 unit/gram cream   Topical BID    prochlorperazine (COMPAZINE) with saline injection 5 mg  5 mg IntraVENous Q6H PRN    naloxone (NARCAN) injection 0.4 mg  0.4 mg IntraVENous EVERY 2 MINUTES AS NEEDED    0.9% sodium chloride infusion 250 mL  250 mL IntraVENous PRN    glucose chewable tablet 16 g  4 Tab Oral PRN    dextrose (D50W) injection syrg 12.5-25 g  12.5-25 g IntraVENous PRN    glucagon (GLUCAGEN) injection 1 mg  1 mg IntraMUSCular PRN    insulin lispro (HUMALOG) injection SubCUTAneous Q6H    scopolamine (TRANSDERM-SCOP) 1 mg over 3 days 1 Patch  1 Patch TransDERmal Q72H    sodium chloride (NS) flush 20 mL  20 mL InterCATHeter PRN    sodium chloride (NS) flush 10 mL  10 mL InterCATHeter Q24H    sodium chloride (NS) flush 10 mL  10 mL InterCATHeter PRN    sodium chloride (NS) flush 10 mL  10 mL InterCATHeter Q8H    alteplase (CATHFLO) 1 mg in sterile water (preservative free) 1 mL injection  1 mg InterCATHeter PRN    bacitracin 500 unit/gram packet 1 Packet  1 Packet Topical PRN    sodium chloride (NS) flush 20 mL  20 mL InterCATHeter PRN    sodium chloride (NS) flush 10 mL  10 mL InterCATHeter Q24H    sodium chloride (NS) flush 10 mL  10 mL InterCATHeter PRN    sodium chloride (NS) flush 10 mL  10 mL InterCATHeter Q8H    dronabinol (MARINOL) capsule 2.5 mg  2.5 mg Oral BID    phenol throat spray (CHLORASEPTIC) 1 Spray  1 Spray Oral PRN    ondansetron (ZOFRAN) injection 4 mg  4 mg IntraVENous Q4H PRN    promethazine (PHENERGAN) 12.5 mg in 0.9% sodium chloride 50 mL IVPB  12.5 mg IntraVENous Q6H PRN    LORazepam (ATIVAN) injection 1 mg  1 mg IntraVENous Q6H PRN    albuterol (PROVENTIL VENTOLIN) nebulizer solution 2.5 mg  2.5 mg Nebulization Q4H PRN          LAB AND IMAGING FINDINGS:     Lab Results   Component Value Date/Time    WBC 12.6 (H) 03/23/2018 05:36 AM    HGB 8.2 (L) 03/23/2018 05:36 AM    PLATELET 575 78/02/6716 05:36 AM     Lab Results   Component Value Date/Time    Sodium 141 03/23/2018 05:36 AM    Potassium 3.7 03/23/2018 05:36 AM    Chloride 107 03/23/2018 05:36 AM    CO2 25 03/23/2018 05:36 AM    BUN 17 03/23/2018 05:36 AM    Creatinine 0.66 03/23/2018 05:36 AM    Calcium 8.3 (L) 03/23/2018 05:36 AM    Magnesium 1.9 03/23/2018 05:36 AM    Phosphorus 4.7 03/23/2018 05:36 AM      Lab Results   Component Value Date/Time    AST (SGOT) 10 (L) 03/23/2018 05:36 AM    Alk.  phosphatase 92 03/23/2018 05:36 AM    Protein, total 6.0 (L) 03/23/2018 05:36 AM    Albumin 1.0 (L) 03/23/2018 05:36 AM    Globulin 5.0 (H) 03/23/2018 05:36 AM     Lab Results   Component Value Date/Time    INR 1.0 12/07/2017 08:37 AM    Prothrombin time 10.0 12/07/2017 08:37 AM      No results found for: IRON, FE, TIBC, IBCT, PSAT, FERR   No results found for: PH, PCO2, PO2  No components found for: GLPOC   No results found for: CPK, CKMB             Total time:   Counseling / coordination time, spent as noted above:   > 50% counseling / coordination?:     Prolonged service was provided for  []30 min   []75 min in face to face time in the presence of the patient, spent as noted above. Time Start:   Time End:   Note: this can only be billed with 27389 (initial) or 48351 (follow up). If multiple start / stop times, list each separately.

## 2018-03-23 NOTE — ROUTINE PROCESS
Bedside shift change report given to April  (oncoming nurse) by Lakisha Soliz (offgoing nurse).  Report included the following information SBAR, Intake/Output, MAR and Cardiac Rhythm NSR-ST.

## 2018-03-23 NOTE — PROGRESS NOTES
Physical Therapy  Attempted to see pt for session this morning. RN reports pt in Soosalu lot of pain\" and is providing medication at this time. She states that then she will have to complete dressing change for abdominal wound. Will f/u as able and appropriate. Recommend OOB to chair in room with mobility team during rounds.   Thank you,  Lizbet Bruno, PT, DPT

## 2018-03-23 NOTE — PROGRESS NOTES
NUTRITION       Recommendations:  1. 6%AA D20 @ 75 mL/hr x 1 hour    @ 150 mL/hr x 12 hours    @ 75 mL/hr x 1 hour   + 20% lipids, 250 mL 3x/week   + Check BG 2 hours into infusion and one hour after infusion is completed    2. Monitor BG and may need to adjust insulin in TPN prn    3. Continue daily weights-- NEED TO REZERO THE BEDSCALE (and using standing scale once pt is able)    Consult received for cyclic TPN recommendations. Chart reviewed, discussed with RN this morning. Pt remains on TPN as sole source of nutrition. She was able to work with therapy yesterday, but unable to stand. Discussed obtained updated weight as able. RN to rezero bedscale when pt is up to the stretcher chair today. Weights in the bedscale have fluctuated up to >20 kg since admission and it is difficult to assess adequacy of TPN (example, weight is \"down\" 5.2 kg from yesterday). Current TPN: 6%AA D20 @ 83 mL/hr + MVI, thiamine (100 mg), 17 units insulin/L, famotidine (40 mg), ascorbic acid (500 mg), zinc sulfate 10 mg 3x/week, trace elements 3x/week + 20% lipids, 250 mL 3x/week  -- This provides a daily average of 2047 kcal, 120 g protein in 1992 mL (2242 mL on lipid days)-- meeting 100% of estimated needs. Above cyclic goal to provide a daily average of 2008 kcal, 117 g protein and 1950 mL (2200 mL on lipid days). GIR based on current weight would be 3 mg/kg/min; however, this is likely not accurate secondary to large weight fluctuations. Did not recalculate estimated needs secondary to large weight fluctuations and bedscale has not been rezeroed.     Last 3 Recorded Weights in this Encounter    03/21/18 0551 03/22/18 0404 03/23/18 0136   Weight: 158.4 kg (349 lb 3.3 oz) 158.4 kg (349 lb 3.3 oz) 153.2 kg (337 lb 11.9 oz)     Estimated Nutrition Needs:   Kcals/day: 1507 Kcals/day (9862-1425 kcal/day (11-15 kcal/kg))  Protein: 110 g (0.8 g/kg)  Fluid: 2000 ml (or per output)     Based On: Kcal/kg - specify (Comment)  Weight Used: Actual wt (137 kg (need updated wt))    RD to follow.     [address]

## 2018-03-24 LAB
ALBUMIN SERPL-MCNC: 1.1 G/DL (ref 3.5–5)
ALBUMIN/GLOB SERPL: 0.2 {RATIO} (ref 1.1–2.2)
ALP SERPL-CCNC: 94 U/L (ref 45–117)
ALT SERPL-CCNC: 10 U/L (ref 12–78)
ANION GAP SERPL CALC-SCNC: 9 MMOL/L (ref 5–15)
AST SERPL-CCNC: 13 U/L (ref 15–37)
BILIRUB SERPL-MCNC: 0.4 MG/DL (ref 0.2–1)
BUN SERPL-MCNC: 16 MG/DL (ref 6–20)
BUN/CREAT SERPL: 23 (ref 12–20)
CALCIUM SERPL-MCNC: 8.1 MG/DL (ref 8.5–10.1)
CHLORIDE SERPL-SCNC: 104 MMOL/L (ref 97–108)
CO2 SERPL-SCNC: 25 MMOL/L (ref 21–32)
CREAT SERPL-MCNC: 0.69 MG/DL (ref 0.55–1.02)
ERYTHROCYTE [DISTWIDTH] IN BLOOD BY AUTOMATED COUNT: 17.4 % (ref 11.5–14.5)
GLOBULIN SER CALC-MCNC: 5.1 G/DL (ref 2–4)
GLUCOSE BLD STRIP.AUTO-MCNC: 143 MG/DL (ref 65–100)
GLUCOSE BLD STRIP.AUTO-MCNC: 150 MG/DL (ref 65–100)
GLUCOSE BLD STRIP.AUTO-MCNC: 151 MG/DL (ref 65–100)
GLUCOSE BLD STRIP.AUTO-MCNC: 155 MG/DL (ref 65–100)
GLUCOSE BLD STRIP.AUTO-MCNC: 180 MG/DL (ref 65–100)
GLUCOSE SERPL-MCNC: 159 MG/DL (ref 65–100)
HCT VFR BLD AUTO: 25.3 % (ref 35–47)
HGB BLD-MCNC: 8.2 G/DL (ref 11.5–16)
MAGNESIUM SERPL-MCNC: 1.6 MG/DL (ref 1.6–2.4)
MCH RBC QN AUTO: 30.4 PG (ref 26–34)
MCHC RBC AUTO-ENTMCNC: 32.4 G/DL (ref 30–36.5)
MCV RBC AUTO: 93.7 FL (ref 80–99)
NRBC # BLD: 0.09 K/UL (ref 0–0.01)
NRBC BLD-RTO: 0.8 PER 100 WBC
PLATELET # BLD AUTO: 232 K/UL (ref 150–400)
PMV BLD AUTO: 10.7 FL (ref 8.9–12.9)
POTASSIUM SERPL-SCNC: 3.2 MMOL/L (ref 3.5–5.1)
PROT SERPL-MCNC: 6.2 G/DL (ref 6.4–8.2)
RBC # BLD AUTO: 2.7 M/UL (ref 3.8–5.2)
SERVICE CMNT-IMP: ABNORMAL
SODIUM SERPL-SCNC: 138 MMOL/L (ref 136–145)
WBC # BLD AUTO: 11.9 K/UL (ref 3.6–11)

## 2018-03-24 PROCEDURE — 3331090001 HH PPS REVENUE CREDIT

## 2018-03-24 PROCEDURE — 74011000250 HC RX REV CODE- 250: Performed by: SURGERY

## 2018-03-24 PROCEDURE — 74011250636 HC RX REV CODE- 250/636: Performed by: EMERGENCY MEDICINE

## 2018-03-24 PROCEDURE — 74011250636 HC RX REV CODE- 250/636: Performed by: SURGERY

## 2018-03-24 PROCEDURE — 74011250637 HC RX REV CODE- 250/637: Performed by: NURSE PRACTITIONER

## 2018-03-24 PROCEDURE — 85027 COMPLETE CBC AUTOMATED: CPT | Performed by: SURGERY

## 2018-03-24 PROCEDURE — 3331090002 HH PPS REVENUE DEBIT

## 2018-03-24 PROCEDURE — 74011636637 HC RX REV CODE- 636/637: Performed by: SURGERY

## 2018-03-24 PROCEDURE — 74011250636 HC RX REV CODE- 250/636: Performed by: NURSE PRACTITIONER

## 2018-03-24 PROCEDURE — 74011250637 HC RX REV CODE- 250/637: Performed by: SURGERY

## 2018-03-24 PROCEDURE — 80053 COMPREHEN METABOLIC PANEL: CPT | Performed by: SURGERY

## 2018-03-24 PROCEDURE — 83735 ASSAY OF MAGNESIUM: CPT | Performed by: SURGERY

## 2018-03-24 PROCEDURE — 74011000258 HC RX REV CODE- 258: Performed by: SURGERY

## 2018-03-24 PROCEDURE — 82962 GLUCOSE BLOOD TEST: CPT

## 2018-03-24 PROCEDURE — 36415 COLL VENOUS BLD VENIPUNCTURE: CPT | Performed by: SURGERY

## 2018-03-24 PROCEDURE — 74011250637 HC RX REV CODE- 250/637: Performed by: PHYSICIAN ASSISTANT

## 2018-03-24 PROCEDURE — 65660000000 HC RM CCU STEPDOWN

## 2018-03-24 RX ORDER — MORPHINE SULFATE 4 MG/ML
5 INJECTION, SOLUTION INTRAMUSCULAR; INTRAVENOUS
Status: DISCONTINUED | OUTPATIENT
Start: 2018-03-24 | End: 2018-03-25 | Stop reason: RX

## 2018-03-24 RX ADMIN — INSULIN LISPRO 3 UNITS: 100 INJECTION, SOLUTION INTRAVENOUS; SUBCUTANEOUS at 12:00

## 2018-03-24 RX ADMIN — INSULIN LISPRO 3 UNITS: 100 INJECTION, SOLUTION INTRAVENOUS; SUBCUTANEOUS at 18:16

## 2018-03-24 RX ADMIN — ONDANSETRON HYDROCHLORIDE 4 MG: 2 INJECTION INTRAMUSCULAR; INTRAVENOUS at 21:04

## 2018-03-24 RX ADMIN — Medication 10 ML: at 14:00

## 2018-03-24 RX ADMIN — OCTREOTIDE ACETATE 50 MCG: 50 INJECTION, SOLUTION INTRAVENOUS; SUBCUTANEOUS at 21:04

## 2018-03-24 RX ADMIN — Medication 10 ML: at 21:04

## 2018-03-24 RX ADMIN — Medication 10 ML: at 10:00

## 2018-03-24 RX ADMIN — ONDANSETRON HYDROCHLORIDE 4 MG: 2 INJECTION INTRAMUSCULAR; INTRAVENOUS at 16:34

## 2018-03-24 RX ADMIN — Medication 10 ML: at 06:14

## 2018-03-24 RX ADMIN — INSULIN LISPRO 3 UNITS: 100 INJECTION, SOLUTION INTRAVENOUS; SUBCUTANEOUS at 06:14

## 2018-03-24 RX ADMIN — DRONABINOL 2.5 MG: 2.5 CAPSULE ORAL at 18:17

## 2018-03-24 RX ADMIN — MORPHINE SULFATE 5 MG: 4 INJECTION, SOLUTION INTRAMUSCULAR; INTRAVENOUS at 09:46

## 2018-03-24 RX ADMIN — ENOXAPARIN SODIUM 40 MG: 100 INJECTION SUBCUTANEOUS at 09:45

## 2018-03-24 RX ADMIN — Medication 10 ML: at 00:07

## 2018-03-24 RX ADMIN — MORPHINE SULFATE 5 MG: 4 INJECTION, SOLUTION INTRAMUSCULAR; INTRAVENOUS at 16:33

## 2018-03-24 RX ADMIN — MORPHINE SULFATE 5 MG: 4 INJECTION, SOLUTION INTRAMUSCULAR; INTRAVENOUS at 01:21

## 2018-03-24 RX ADMIN — DRONABINOL 2.5 MG: 2.5 CAPSULE ORAL at 09:45

## 2018-03-24 RX ADMIN — Medication: at 14:38

## 2018-03-24 RX ADMIN — NYSTATIN: 100000 CREAM TOPICAL at 10:01

## 2018-03-24 RX ADMIN — ONDANSETRON HYDROCHLORIDE 4 MG: 2 INJECTION INTRAMUSCULAR; INTRAVENOUS at 10:33

## 2018-03-24 RX ADMIN — Medication: at 00:23

## 2018-03-24 RX ADMIN — INSULIN LISPRO 3 UNITS: 100 INJECTION, SOLUTION INTRAVENOUS; SUBCUTANEOUS at 23:51

## 2018-03-24 RX ADMIN — FUROSEMIDE 20 MG: 10 INJECTION, SOLUTION INTRAMUSCULAR; INTRAVENOUS at 09:44

## 2018-03-24 RX ADMIN — OCTREOTIDE ACETATE 50 MCG: 50 INJECTION, SOLUTION INTRAVENOUS; SUBCUTANEOUS at 16:34

## 2018-03-24 RX ADMIN — OCTREOTIDE ACETATE 50 MCG: 50 INJECTION, SOLUTION INTRAVENOUS; SUBCUTANEOUS at 09:45

## 2018-03-24 RX ADMIN — ASPIRIN 325 MG: 325 TABLET ORAL at 09:44

## 2018-03-24 RX ADMIN — MORPHINE SULFATE 5 MG: 4 INJECTION, SOLUTION INTRAMUSCULAR; INTRAVENOUS at 21:31

## 2018-03-24 RX ADMIN — ACETAMINOPHEN 650 MG: 325 TABLET, FILM COATED ORAL at 23:52

## 2018-03-24 RX ADMIN — ASCORBIC ACID: 500 INJECTION, SOLUTION INTRAMUSCULAR; INTRAVENOUS; SUBCUTANEOUS at 18:18

## 2018-03-24 NOTE — PROGRESS NOTES
Bedside shift change report given to RN (oncoming nurse) by April, RN (offgoing nurse). Report included the following information SBAR, Kardex, Intake/Output, MAR, Accordion and Cardiac Rhythm NSR.

## 2018-03-24 NOTE — PROGRESS NOTES
Progress Note    Patient: Shannan Andrea MRN: [id number]  SSN: [id number]    YOB: 1975  Age: 38 y.o. Sex: female      Admit Date: 3/7/2018    17 Days Post-Op    Procedure:  Procedure(s):  EXPLORATORY LAPAROTOMY, LYSIS OF ADHESION X 5HOURS, SMALL BOWEL FISTULA TAKE DOWN X 2 AND WOUND VAC PLACEMENT    Subjective:     No acute surgical issues. Pain is under control. Pt reported some nausea with octreotide. Fistula still productive but ostomy is putting out more. Afebrile off antibiotic    Objective:     Visit Vitals    BP 95/47    Pulse (!) 113    Temp 98.8 °F (37.1 °C)    Resp 16    Ht 5' 4\" (1.626 m)    Wt 345 lb 14.4 oz (156.9 kg)    SpO2 97%    BMI 59.37 kg/m2       Temp (24hrs), Av.6 °F (37 °C), Min:98.4 °F (36.9 °C), Max:98.8 °F (37.1 °C)        Physical Exam:    Gen:  NAD  Pulm:  Unlabored  Abd:  S/ND/appropriate TTP  Midline wound with bilious output  Ostomy:  Pink, patent and productive     Recent Results (from the past 24 hour(s))   GLUCOSE, POC    Collection Time: 18  5:56 PM   Result Value Ref Range    Glucose (POC) 133 (H) 65 - 100 mg/dL    Performed by Obed Montano    GLUCOSE, POC    Collection Time: 18 12:05 AM   Result Value Ref Range    Glucose (POC) 151 (H) 65 - 100 mg/dL    Performed by Morene Alpers    METABOLIC PANEL, COMPREHENSIVE    Collection Time: 18  4:08 AM   Result Value Ref Range    Sodium 138 136 - 145 mmol/L    Potassium 3.2 (L) 3.5 - 5.1 mmol/L    Chloride 104 97 - 108 mmol/L    CO2 25 21 - 32 mmol/L    Anion gap 9 5 - 15 mmol/L    Glucose 159 (H) 65 - 100 mg/dL    BUN 16 6 - 20 MG/DL    Creatinine 0.69 0.55 - 1.02 MG/DL    BUN/Creatinine ratio 23 (H) 12 - 20      GFR est AA >60 >60 ml/min/1.73m2    GFR est non-AA >60 >60 ml/min/1.73m2    Calcium 8.1 (L) 8.5 - 10.1 MG/DL    Bilirubin, total 0.4 0.2 - 1.0 MG/DL    ALT (SGPT) 10 (L) 12 - 78 U/L    AST (SGOT) 13 (L) 15 - 37 U/L    Alk.  phosphatase 94 45 - 117 U/L    Protein, total 6.2 (L) 6.4 - 8.2 g/dL    Albumin 1.1 (L) 3.5 - 5.0 g/dL    Globulin 5.1 (H) 2.0 - 4.0 g/dL    A-G Ratio 0.2 (L) 1.1 - 2.2     MAGNESIUM    Collection Time: 03/24/18  4:08 AM   Result Value Ref Range    Magnesium 1.6 1.6 - 2.4 mg/dL   CBC W/O DIFF    Collection Time: 03/24/18  4:08 AM   Result Value Ref Range    WBC 11.9 (H) 3.6 - 11.0 K/uL    RBC 2.70 (L) 3.80 - 5.20 M/uL    HGB 8.2 (L) 11.5 - 16.0 g/dL    HCT 25.3 (L) 35.0 - 47.0 %    MCV 93.7 80.0 - 99.0 FL    MCH 30.4 26.0 - 34.0 PG    MCHC 32.4 30.0 - 36.5 g/dL    RDW 17.4 (H) 11.5 - 14.5 %    PLATELET 532 227 - 256 K/uL    MPV 10.7 8.9 - 12.9 FL    NRBC 0.8 (H) 0  WBC    ABSOLUTE NRBC 0.09 (H) 0.00 - 0.01 K/uL   GLUCOSE, POC    Collection Time: 03/24/18  6:06 AM   Result Value Ref Range    Glucose (POC) 155 (H) 65 - 100 mg/dL    Performed by Rayshawn Nielsen    GLUCOSE, POC    Collection Time: 03/24/18 11:24 AM   Result Value Ref Range    Glucose (POC) 150 (H) 65 - 100 mg/dL    Performed by Daniela Martin          Assessment:     Hospital Problems  Date Reviewed: 10/27/2017          Codes Class Noted POA    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

## 2018-03-24 NOTE — PROGRESS NOTES
Problem: Falls - Risk of  Goal: *Absence of Falls  Document Marlene Fall Risk and appropriate interventions in the flowsheet. Outcome: Progressing Towards Goal  Fall Risk Interventions:  Mobility Interventions: Communicate number of staff needed for ambulation/transfer, Patient to call before getting OOB, PT Consult for mobility concerns, PT Consult for assist device competence         Medication Interventions: Patient to call before getting OOB    Elimination Interventions: Call light in reach    History of Falls Interventions:  Investigate reason for fall

## 2018-03-24 NOTE — PROGRESS NOTES
1930 - Bedside and Verbal shift change report given to Song Chavira RN (oncoming nurse) by MAURI Aleman (offgoing nurse). Report included the following information SBAR, Kardex, Procedure Summary, Intake/Output, MAR, Recent Results, Cardiac Rhythm Sinus Rhythm and Alarm Parameters . 2154 - Pt given 5mg IV PRN morphine for 8/10 chronic, medial, abdominal pain. 0023 - Pt hydromorphone PCA syringe replaced. RN dual verified. 0730 - Bedside and Verbal shift change report given to Norma Cruz RN (oncoming nurse) by Song Chavira RN (offgoing nurse). Report included the following information SBAR, Kardex, Intake/Output, MAR, Recent Results, Cardiac Rhythm Sinus Rhythm and Alarm Parameters .

## 2018-03-24 NOTE — PROGRESS NOTES
Problem: Falls - Risk of  Goal: *Absence of Falls  Document Marlene Fall Risk and appropriate interventions in the flowsheet.    Outcome: Progressing Towards Goal  Fall Risk Interventions:  Mobility Interventions: Communicate number of staff needed for ambulation/transfer, Patient to call before getting OOB         Medication Interventions: Evaluate medications/consider consulting pharmacy, Patient to call before getting OOB    Elimination Interventions: Call light in reach, Patient to call for help with toileting needs, Toilet paper/wipes in reach, Toileting schedule/hourly rounds    History of Falls Interventions: Door open when patient unattended, Room close to nurse's station, Evaluate medications/consider consulting pharmacy

## 2018-03-24 NOTE — PROGRESS NOTES
Problem: Falls - Risk of  Goal: *Absence of Falls  Document Marlene Fall Risk and appropriate interventions in the flowsheet.    Outcome: Progressing Towards Goal  Fall Risk Interventions:  Mobility Interventions: Communicate number of staff needed for ambulation/transfer         Medication Interventions: Evaluate medications/consider consulting pharmacy, Patient to call before getting OOB, Teach patient to arise slowly    Elimination Interventions: Patient to call for help with toileting needs, Toileting schedule/hourly rounds, Call light in reach    History of Falls Interventions: Consult care management for discharge planning, Investigate reason for fall        Problem: Pressure Injury - Risk of  Goal: *Prevention of pressure ulcer  Outcome: Progressing Towards Goal  Q2 turns/repositions, blood glucose monitoring, pressure points offloaded, hope to increase time spent OOB with PT/OT

## 2018-03-25 LAB
ABO + RH BLD: NORMAL
ANTIGENS PRESENT RBC DONR: NORMAL
ANTIGENS PRESENT RBC DONR: NORMAL
BLD PROD TYP BPU: NORMAL
BLD PROD TYP BPU: NORMAL
BLOOD BANK CMNT PATIENT-IMP: NORMAL
BLOOD GROUP ANTIBODIES SERPL: NORMAL
BPU ID: NORMAL
BPU ID: NORMAL
CROSSMATCH RESULT,%XM: NORMAL
CROSSMATCH RESULT,%XM: NORMAL
GLUCOSE BLD STRIP.AUTO-MCNC: 145 MG/DL (ref 65–100)
GLUCOSE BLD STRIP.AUTO-MCNC: 150 MG/DL (ref 65–100)
GLUCOSE BLD STRIP.AUTO-MCNC: 155 MG/DL (ref 65–100)
GLUCOSE BLD STRIP.AUTO-MCNC: 164 MG/DL (ref 65–100)
SERVICE CMNT-IMP: ABNORMAL
SPECIMEN EXP DATE BLD: NORMAL
STATUS OF UNIT,%ST: NORMAL
STATUS OF UNIT,%ST: NORMAL
UNIT DIVISION, %UDIV: 0
UNIT DIVISION, %UDIV: 0

## 2018-03-25 PROCEDURE — 74011250636 HC RX REV CODE- 250/636: Performed by: SURGERY

## 2018-03-25 PROCEDURE — 77030018836 HC SOL IRR NACL ICUM -A

## 2018-03-25 PROCEDURE — 74011000250 HC RX REV CODE- 250: Performed by: SURGERY

## 2018-03-25 PROCEDURE — 74011636637 HC RX REV CODE- 636/637: Performed by: SURGERY

## 2018-03-25 PROCEDURE — 74011000258 HC RX REV CODE- 258: Performed by: SURGERY

## 2018-03-25 PROCEDURE — 74011250636 HC RX REV CODE- 250/636: Performed by: EMERGENCY MEDICINE

## 2018-03-25 PROCEDURE — 3331090002 HH PPS REVENUE DEBIT

## 2018-03-25 PROCEDURE — 65660000000 HC RM CCU STEPDOWN

## 2018-03-25 PROCEDURE — 3331090001 HH PPS REVENUE CREDIT

## 2018-03-25 PROCEDURE — 82962 GLUCOSE BLOOD TEST: CPT

## 2018-03-25 PROCEDURE — 74011250636 HC RX REV CODE- 250/636: Performed by: NURSE PRACTITIONER

## 2018-03-25 PROCEDURE — 74011250637 HC RX REV CODE- 250/637: Performed by: PHYSICIAN ASSISTANT

## 2018-03-25 RX ADMIN — INSULIN LISPRO 3 UNITS: 100 INJECTION, SOLUTION INTRAVENOUS; SUBCUTANEOUS at 05:27

## 2018-03-25 RX ADMIN — INSULIN LISPRO 3 UNITS: 100 INJECTION, SOLUTION INTRAVENOUS; SUBCUTANEOUS at 23:28

## 2018-03-25 RX ADMIN — INSULIN LISPRO 3 UNITS: 100 INJECTION, SOLUTION INTRAVENOUS; SUBCUTANEOUS at 13:02

## 2018-03-25 RX ADMIN — OCTREOTIDE ACETATE 50 MCG: 50 INJECTION, SOLUTION INTRAVENOUS; SUBCUTANEOUS at 16:07

## 2018-03-25 RX ADMIN — OCTREOTIDE ACETATE 50 MCG: 50 INJECTION, SOLUTION INTRAVENOUS; SUBCUTANEOUS at 22:22

## 2018-03-25 RX ADMIN — ENOXAPARIN SODIUM 40 MG: 100 INJECTION SUBCUTANEOUS at 10:53

## 2018-03-25 RX ADMIN — NYSTATIN: 100000 CREAM TOPICAL at 11:06

## 2018-03-25 RX ADMIN — Medication 10 ML: at 22:22

## 2018-03-25 RX ADMIN — INSULIN LISPRO 2 UNITS: 100 INJECTION, SOLUTION INTRAVENOUS; SUBCUTANEOUS at 18:02

## 2018-03-25 RX ADMIN — Medication 20 ML: at 19:18

## 2018-03-25 RX ADMIN — MORPHINE SULFATE 5 MG: 2 INJECTION, SOLUTION INTRAMUSCULAR; INTRAVENOUS at 19:14

## 2018-03-25 RX ADMIN — ONDANSETRON HYDROCHLORIDE 4 MG: 2 INJECTION INTRAMUSCULAR; INTRAVENOUS at 16:04

## 2018-03-25 RX ADMIN — Medication 10 ML: at 05:28

## 2018-03-25 RX ADMIN — OCTREOTIDE ACETATE 50 MCG: 50 INJECTION, SOLUTION INTRAVENOUS; SUBCUTANEOUS at 11:03

## 2018-03-25 RX ADMIN — Medication 10 ML: at 11:06

## 2018-03-25 RX ADMIN — ASCORBIC ACID: 500 INJECTION, SOLUTION INTRAMUSCULAR; INTRAVENOUS; SUBCUTANEOUS at 19:16

## 2018-03-25 RX ADMIN — MORPHINE SULFATE 5 MG: 2 INJECTION, SOLUTION INTRAMUSCULAR; INTRAVENOUS at 23:16

## 2018-03-25 RX ADMIN — Medication 10 ML: at 13:04

## 2018-03-25 RX ADMIN — DRONABINOL 2.5 MG: 2.5 CAPSULE ORAL at 11:04

## 2018-03-25 RX ADMIN — DRONABINOL 2.5 MG: 2.5 CAPSULE ORAL at 18:03

## 2018-03-25 RX ADMIN — Medication 10 ML: at 13:03

## 2018-03-25 RX ADMIN — ONDANSETRON HYDROCHLORIDE 4 MG: 2 INJECTION INTRAMUSCULAR; INTRAVENOUS at 10:59

## 2018-03-25 RX ADMIN — Medication 10 ML: at 22:23

## 2018-03-25 RX ADMIN — Medication: at 16:10

## 2018-03-25 RX ADMIN — NYSTATIN: 100000 CREAM TOPICAL at 18:05

## 2018-03-25 RX ADMIN — Medication: at 00:16

## 2018-03-25 RX ADMIN — LORAZEPAM 1 MG: 2 INJECTION INTRAMUSCULAR; INTRAVENOUS at 10:55

## 2018-03-25 RX ADMIN — MORPHINE SULFATE 5 MG: 4 INJECTION, SOLUTION INTRAMUSCULAR; INTRAVENOUS at 00:37

## 2018-03-25 RX ADMIN — ONDANSETRON HYDROCHLORIDE 4 MG: 2 INJECTION INTRAMUSCULAR; INTRAVENOUS at 22:22

## 2018-03-25 RX ADMIN — MORPHINE SULFATE 5 MG: 4 INJECTION, SOLUTION INTRAMUSCULAR; INTRAVENOUS at 05:27

## 2018-03-25 NOTE — PROGRESS NOTES
Progress Note    Patient: Laurie Bailey MRN: [id number]  SSN: [id number]    YOB: 1975  Age: 38 y.o. Sex: female      Admit Date: 3/7/2018    18 Days Post-Op    Procedure:  Procedure(s):  EXPLORATORY LAPAROTOMY, LYSIS OF ADHESION X 5HOURS, SMALL BOWEL FISTULA TAKE DOWN X 2 AND WOUND VAC PLACEMENT    Subjective:     No acute surgical issues. Pt still with output from midline fistula. Ostomy is minimally productive.   Afebrile off antibiotic    Objective:     Visit Vitals    /55 (BP 1 Location: Left arm, BP Patient Position: At rest)    Pulse (!) 106    Temp 98 °F (36.7 °C)    Resp 12    Ht 5' 4\" (1.626 m)    Wt 346 lb 9 oz (157.2 kg)    SpO2 99%    BMI 59.49 kg/m2       Temp (24hrs), Av.7 °F (37.1 °C), Min:98 °F (36.7 °C), Max:99.5 °F (37.5 °C)        Physical Exam:    Gen:  NAD  Pulm:  Unlabored  Abd:  S/ND/appropriate TTP  Midline wound with bilious output  Ostomy:  Pink, patent and productive     Recent Results (from the past 24 hour(s))   GLUCOSE, POC    Collection Time: 18  6:04 PM   Result Value Ref Range    Glucose (POC) 143 (H) 65 - 100 mg/dL    Performed by Brian CARTER    GLUCOSE, POC    Collection Time: 18 11:31 PM   Result Value Ref Range    Glucose (POC) 180 (H) 65 - 100 mg/dL    Performed by Vitaliy Blanco 150, POC    Collection Time: 18  5:15 AM   Result Value Ref Range    Glucose (POC) 145 (H) 65 - 100 mg/dL    Performed by Nasir [place]:     Hospital Problems  Date Reviewed: 10/27/2017          Codes Class Noted POA    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

## 2018-03-25 NOTE — PROGRESS NOTES
03/25/18 1143   Vitals   Temp 99 °F (37.2 °C)   Temp Source Oral   Pulse (Heart Rate) (!) 114   Heart Rate Source Monitor   Resp Rate 16   O2 Sat (%) 98 %   Level of Consciousness Alert   BP 96/52   MAP (Calculated) 67   BP 1 Location Left arm   BP 1 Method Automatic   BP Patient Position At rest   Cardiac Rhythm Sinus Tach   MEWS Score 4   MEWS of 4 pt is emotional after talking to DR. Urban cry during vital signs

## 2018-03-25 NOTE — PROGRESS NOTES
Bedside shift change report given to Judith(oncoming nurse) by Xochitl Salazar (offgoing nurse). Report included the following information SBAR, Kardex, Intake/Output, Recent Results and Cardiac Rhythm.

## 2018-03-25 NOTE — PROGRESS NOTES
Bedside shift change report given to MAURI Latif (oncoming nurse) by Mary Jane Cat (offgoing nurse). Report included the following information SBAR, Intake/Output, MAR and Cardiac Rhythm sinus tachy.

## 2018-03-26 LAB
ANION GAP SERPL CALC-SCNC: 7 MMOL/L (ref 5–15)
BUN SERPL-MCNC: 14 MG/DL (ref 6–20)
BUN/CREAT SERPL: 25 (ref 12–20)
CALCIUM SERPL-MCNC: 8.2 MG/DL (ref 8.5–10.1)
CHLORIDE SERPL-SCNC: 105 MMOL/L (ref 97–108)
CO2 SERPL-SCNC: 27 MMOL/L (ref 21–32)
CREAT SERPL-MCNC: 0.55 MG/DL (ref 0.55–1.02)
ERYTHROCYTE [DISTWIDTH] IN BLOOD BY AUTOMATED COUNT: 17.6 % (ref 11.5–14.5)
GLUCOSE BLD STRIP.AUTO-MCNC: 128 MG/DL (ref 65–100)
GLUCOSE BLD STRIP.AUTO-MCNC: 148 MG/DL (ref 65–100)
GLUCOSE BLD STRIP.AUTO-MCNC: 152 MG/DL (ref 65–100)
GLUCOSE BLD STRIP.AUTO-MCNC: 157 MG/DL (ref 65–100)
GLUCOSE SERPL-MCNC: 126 MG/DL (ref 65–100)
HCT VFR BLD AUTO: 22.6 % (ref 35–47)
HGB BLD-MCNC: 7.1 G/DL (ref 11.5–16)
MAGNESIUM SERPL-MCNC: 1.9 MG/DL (ref 1.6–2.4)
MCH RBC QN AUTO: 29.8 PG (ref 26–34)
MCHC RBC AUTO-ENTMCNC: 31.4 G/DL (ref 30–36.5)
MCV RBC AUTO: 95 FL (ref 80–99)
NRBC # BLD: 0.06 K/UL (ref 0–0.01)
NRBC BLD-RTO: 0.6 PER 100 WBC
PLATELET # BLD AUTO: 284 K/UL (ref 150–400)
PMV BLD AUTO: 10.3 FL (ref 8.9–12.9)
POTASSIUM SERPL-SCNC: 3.8 MMOL/L (ref 3.5–5.1)
RBC # BLD AUTO: 2.38 M/UL (ref 3.8–5.2)
SERVICE CMNT-IMP: ABNORMAL
SODIUM SERPL-SCNC: 139 MMOL/L (ref 136–145)
WBC # BLD AUTO: 9.5 K/UL (ref 3.6–11)

## 2018-03-26 PROCEDURE — 82962 GLUCOSE BLOOD TEST: CPT

## 2018-03-26 PROCEDURE — 3331090001 HH PPS REVENUE CREDIT

## 2018-03-26 PROCEDURE — 74011250637 HC RX REV CODE- 250/637: Performed by: NURSE PRACTITIONER

## 2018-03-26 PROCEDURE — 77030018836 HC SOL IRR NACL ICUM -A

## 2018-03-26 PROCEDURE — 74011000258 HC RX REV CODE- 258: Performed by: SURGERY

## 2018-03-26 PROCEDURE — 80048 BASIC METABOLIC PNL TOTAL CA: CPT | Performed by: SURGERY

## 2018-03-26 PROCEDURE — 65660000000 HC RM CCU STEPDOWN

## 2018-03-26 PROCEDURE — 74011636637 HC RX REV CODE- 636/637: Performed by: SURGERY

## 2018-03-26 PROCEDURE — 85027 COMPLETE CBC AUTOMATED: CPT | Performed by: SURGERY

## 2018-03-26 PROCEDURE — 74011250636 HC RX REV CODE- 250/636: Performed by: EMERGENCY MEDICINE

## 2018-03-26 PROCEDURE — 74011250636 HC RX REV CODE- 250/636: Performed by: NURSE PRACTITIONER

## 2018-03-26 PROCEDURE — 74011000250 HC RX REV CODE- 250: Performed by: SURGERY

## 2018-03-26 PROCEDURE — 74011250637 HC RX REV CODE- 250/637: Performed by: PHYSICIAN ASSISTANT

## 2018-03-26 PROCEDURE — 74011250636 HC RX REV CODE- 250/636: Performed by: SURGERY

## 2018-03-26 PROCEDURE — 3331090002 HH PPS REVENUE DEBIT

## 2018-03-26 PROCEDURE — 83735 ASSAY OF MAGNESIUM: CPT | Performed by: SURGERY

## 2018-03-26 PROCEDURE — 36415 COLL VENOUS BLD VENIPUNCTURE: CPT | Performed by: SURGERY

## 2018-03-26 RX ADMIN — Medication 10 ML: at 06:26

## 2018-03-26 RX ADMIN — Medication: at 03:36

## 2018-03-26 RX ADMIN — Medication 10 ML: at 10:15

## 2018-03-26 RX ADMIN — OCTREOTIDE ACETATE 50 MCG: 50 INJECTION, SOLUTION INTRAVENOUS; SUBCUTANEOUS at 10:13

## 2018-03-26 RX ADMIN — ASPIRIN 325 MG: 325 TABLET ORAL at 10:13

## 2018-03-26 RX ADMIN — ONDANSETRON HYDROCHLORIDE 4 MG: 2 INJECTION INTRAMUSCULAR; INTRAVENOUS at 10:13

## 2018-03-26 RX ADMIN — INSULIN LISPRO 3 UNITS: 100 INJECTION, SOLUTION INTRAVENOUS; SUBCUTANEOUS at 23:53

## 2018-03-26 RX ADMIN — Medication: at 18:22

## 2018-03-26 RX ADMIN — NYSTATIN: 100000 CREAM TOPICAL at 10:14

## 2018-03-26 RX ADMIN — OCTREOTIDE ACETATE 50 MCG: 50 INJECTION, SOLUTION INTRAVENOUS; SUBCUTANEOUS at 16:59

## 2018-03-26 RX ADMIN — ASCORBIC ACID: 500 INJECTION, SOLUTION INTRAMUSCULAR; INTRAVENOUS; SUBCUTANEOUS at 19:49

## 2018-03-26 RX ADMIN — PROCHLORPERAZINE EDISYLATE 5 MG: 5 INJECTION INTRAMUSCULAR; INTRAVENOUS at 22:11

## 2018-03-26 RX ADMIN — ENOXAPARIN SODIUM 40 MG: 100 INJECTION SUBCUTANEOUS at 10:13

## 2018-03-26 RX ADMIN — MORPHINE SULFATE 5 MG: 2 INJECTION, SOLUTION INTRAMUSCULAR; INTRAVENOUS at 06:26

## 2018-03-26 RX ADMIN — DRONABINOL 2.5 MG: 2.5 CAPSULE ORAL at 10:13

## 2018-03-26 RX ADMIN — MORPHINE SULFATE 5 MG: 2 INJECTION, SOLUTION INTRAMUSCULAR; INTRAVENOUS at 20:01

## 2018-03-26 RX ADMIN — INSULIN LISPRO 3 UNITS: 100 INJECTION, SOLUTION INTRAVENOUS; SUBCUTANEOUS at 06:25

## 2018-03-26 RX ADMIN — I.V. FAT EMULSION 250 ML: 20 EMULSION INTRAVENOUS at 19:49

## 2018-03-26 RX ADMIN — OCTREOTIDE ACETATE 50 MCG: 50 INJECTION, SOLUTION INTRAVENOUS; SUBCUTANEOUS at 22:02

## 2018-03-26 RX ADMIN — DRONABINOL 2.5 MG: 2.5 CAPSULE ORAL at 17:00

## 2018-03-26 RX ADMIN — INSULIN LISPRO 3 UNITS: 100 INJECTION, SOLUTION INTRAVENOUS; SUBCUTANEOUS at 13:08

## 2018-03-26 RX ADMIN — ONDANSETRON HYDROCHLORIDE 4 MG: 2 INJECTION INTRAMUSCULAR; INTRAVENOUS at 16:59

## 2018-03-26 RX ADMIN — MORPHINE SULFATE 5 MG: 2 INJECTION, SOLUTION INTRAMUSCULAR; INTRAVENOUS at 12:42

## 2018-03-26 RX ADMIN — NYSTATIN: 100000 CREAM TOPICAL at 17:06

## 2018-03-26 NOTE — ROUTINE PROCESS
Bedside and Verbal shift change report given to Nila Boothe (oncoming nurse) by Eli Quinn (offgoing nurse). Report included the following information SBAR, Procedure Summary, Intake/Output, MAR and Cardiac Rhythm [facility].

## 2018-03-26 NOTE — PROGRESS NOTES
NUTRITION       Recommendations:  1. Need to obtain accurate weight-- rezero the bedscale if pt up in recliner chair vs standing with PT vs lift  -- There is still no actual weight since admission and it's difficult to note true trends and adjust TPN    2. Continue TPN to meet 100% of needs  -- If resumption of cyclic regimen is desired, see recs below. Brief TPN note. Chart reviewed, discussed with team during interdisciplinary rounds. Pt remains on TPN as sole source of nutrition. Cyclic recommendations noted on 3/23; however, pt remains on continuous. Still not actual weight to assess (via rezeroed bedscale, lift or standing weight). 3.7 kg difference x 3 days per bedscale today and 20 kg weight difference from admission weight. Fistula Output (3/24-26): 550 mL, 1095 mL, 250 mL    TPN: 6%AA D20 @ 83 mL/hr + MVI, thiamine (100 mg), famotidine (40 mg), ascorbic acid (500 mg), trace elements 3x/week, zinc sulfate 10 mg 3x/week + 20% lipids, 250 mL 3x/week  -- This provides a daily average of 2049 kcal, 120 g protein in 1992 mL (2242 mL on lipid days)-- meeting 100% of estimated needs. If resumption of cyclic TPN is warranted, would recommend goal of:   6%AA D20 @ 75 mL/hr x 1 hour                                              @ 150 mL/hr x 12 hours                                              @ 75 mL/hr x 1 hour                        + 20% lipids, 250 mL 3x/week                        + Check BG 2 hours into infusion and one hour after infusion is completed  -- This sixto provide a daily average of 2008 kcal, 117 g protein in 1950 mL (2200 mL on lipid days)    Estimated Nutrition Needs:   Kcals/day: 1507 Kcals/day (0886-9399 kcal/day (11-15 kcal/kg))  Protein: 110 g (0.8 g/kg)  Fluid: 2000 ml (or per output)     Based On: Kcal/kg - specify (Comment)  Weight Used: Actual wt (137 kg (need updated wt))    RD to follow.     [address]

## 2018-03-26 NOTE — ROUTINE PROCESS
Bedside shift change report given to Amador (oncoming nurse) by Klarissa Cha (offgoing nurse). Report included the following information SBAR, Kardex, Procedure Summary, Intake/Output, MAR and Recent Results.

## 2018-03-26 NOTE — PROGRESS NOTES
Palliative Medicine Consult  Heath: [phone] ([phone])    Patient Name: Zuleyma Xie  YOB: 1975    Date of Initial Consult: 3/12/18  Reason for Consult: Pain management, chronic and post op   Requesting Provider: Rajni   Primary Care Physician: Milady Ulrich MD     SUMMARY:   Zuleyma Xie is a 38 y.o. with a past history of Crohn's disease (followed by Dr Martha Matos), mult surgeries s/p total colectomy w/ end ileostomy, 6/2017 ex lap with small bowel perforation, 6/28/17 ex lap, LPA, repair or enterotomy, SMA stent on L, 7/16/2917 ex lap, KATHRINE, fistula exclusion w/ feeding tube placement and prolonged TPN who was admitted on 3/7/2018 from home for planned surgery, s/p ex lap w/ extensive KATHRINE and small bowel fistula take down, wound vac placement. 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 Known to our team during past hospitalizations. Has had lengthy hospital stays w/ mult complications. Has required [facility] stays in past. Has chronic pain from Crohn's disease (and has not been able to tolerate home Crohn's meds recently due to acute issues- had been on steroids, stopped prior to surgery). Discussion of Remicaide in future. Current medical issues leading to Palliative Medicine involvement include: pain management. Patient's mother, Amelia Moore is NOK.  reviewed, no abberrancies- one prescriber for opioids and one pharmacy. Most recently filled 3/9/18: MSContin 100mg tid and Morphine IR 30mg- 8 tabs a day. Has been tried on equivalent dose of Fentanyl patch w/out same benefit. Discussed Methadone but would have to check w/ her prescriber. PALLIATIVE DIAGNOSES:   Acute post operative pain in abdomen(EXPLORATORY LAPAROTOMY, LYSIS OF ADHESION X 5HOURS, SMALL BOWEL FISTULA TAKE DOWN X 2 AND WOUND VAC PLACEMENT day 19th )   1. Chronic abdominal pain from Cronh's disease and hx of surgeries  2. Nausea  3. Anxiety   4.  Feeding difficulties due to medical condition- has been on TPN for several months  5. Edema   6. L ankle pain s/p fall, normal XRs- improving   7. Grief/depressive sx   8. Constipation   9. Opioid tolerance      PLAN:     Abd pain from surgical incision and wound care as well as underlying chronic pain from Crohn's which is worse when off her steroids. Has high opioid tolerance, no aberrancies on . We have done opioid rotation in the hospital for her in past.     Pain is stable       1. currently Dilaudid PCA 0.6mg/h and 0.5mg IV every 6 min( 53 attempts and 29 delivered in last 12 hours )  1. Continue morphine 5mg IV q3h for severe pain; can use if SBP > 90; if SBP < 90, would recheck a few times, if persistently low would consider notifying primary service  2. Suggest d/c basal and switch to home dose of ms contin, if ok with surgery . 3. Following to assist w/ transition to po and for emotional support as pt needs. PC has spoken w/ Dr Panchito Iqbal (sees pt for her pain) and he is okay w/ continuing home regimen upon discharge if needed. Dr. Daniel Joseph explained to pt she does not recommend higher doses when she leaves. She will have her mom check how many days of pain pills she has at home and her next appt w/ Dr eC Montano. If she needs a few days Rx to bridge her, PC can do this for her as know her hx well. 3.Bowel regimen, anti-emetics per surgery, nausea with octreotide, managed with zofran . 4. Nutrition : on chronic TPN    5. Psychosocial/spritual : her mom is main support, patient is fairly coping with positive attitude , her  is in close touch with her . Communicated plan of care with: Palliative IDT; bed side Rn [facility].        GOALS OF CARE / TREATMENT PREFERENCES:     GOALS OF CARE:  Patient/Health Care Proxy Stated Goals:  (Sx management )      TREATMENT PREFERENCES:   Code Status: Full Code    Advance Care Planning:  Advance Care Planning 3/15/2018   Patient's Healthcare Decision Maker is: Named in scanned ACP document   Primary Decision Maker Name Leanna Strong   Primary Decision Maker Phone Number [phone]   Primary Decision Maker Relationship to Patient Parent   Secondary Decision Maker Name Nichelle Cullen   Secondary Decision Maker Phone Number [phone]   Secondary Decision Maker Relationship to Patient Unknown   Confirm Advance Directive Yes, on file   Patient Would Like to Complete Advance Directive -   Does the patient have other document types -       Medical Interventions: Full interventions           Other:    As far as possible, the palliative care team has discussed with patient / health care proxy about goals of care / treatment preferences for patient. HISTORY:    patient says pain varies \" up and down \", currently 7/10, her base line pain is 5/10. Notes nausea with octreotide(nause side effect is 5-61%( UPTO DATE )    Blood pressure is at base [id number]. I/v fluids discontinued because of fluid retention and edema. Reviewed vitals, I/O's, labs, imaging, MAR, notes  Tmax 100.3, , on room air, \"alert\"    Na and cr wnl, lft not elevated, albumin 1.1          MAR includes   Tylenol, no use in last 48 hrs , last dose is 2300 on 3/24/18. Albuterol prn, no recent dose  Asa 325mg  Benadryl, no recent dose  Lasix 20mg iv daily  Marinol  Dilaudid pca, 0.6mg/hr basal, 0.5mg every 6min pca dose  Morphine 5mg IV q3h PRN, 1900, 23 00 (3/25/18 ) and 0600 3/26  Ativan 1mg IV prn, 0 use in last 24 hrs  Zofran, 3 doses yesterday  Compazine, no recent dose  Phenergan, no recent dose  Octreotide. Scopolamine patch        HPI/SUBJECTIVE:    The patient is:   [x] Verbal and participatory  [] Non-participatory due to:     Pain severe this morning during my visit -- pca has run out, awaiting new cartridge. RN getting PRN morphine dose now. No nausea, no sob.     3/26/18: pain is up and down , 7/10 now, base line pain is 5/10, she reports nausea with octeotide, relived with zofran. Clinical Pain Assessment (nonverbal scale for severity on nonverbal patients):   Clinical Pain Assessment  Severity: 8  Location: Abdomen   Character: Sharp and aching   Duration: chronic and since surgery   Effect: harder to move   Factors: worse w/ movement and wound care  Frequency: constant          Duration: for how long has pt been experiencing pain (e.g., 2 days, 1 month, years)  Frequency: how often pain is an issue (e.g., several times per day, once every few days, constant)     FUNCTIONAL ASSESSMENT:     Palliative Performance Scale (PPS):  PPS: 60       PSYCHOSOCIAL/SPIRITUAL SCREENING:     Palliative IDT has assessed this patient for cultural preferences / practices and a referral made as appropriate to needs (Cultural Services, Patient Advocacy, Ethics, etc.)    Advance Care Planning:  Advance Care Planning 3/15/2018   Patient's Healthcare Decision Maker is: Named in scanned ACP document   Primary Decision Maker Name Arnold Negron   Primary Decision Maker Phone Number [phone]   Primary Decision Maker Relationship to Patient Parent   Secondary Decision Maker Name Raymond Kelly   Secondary Decision Maker Phone Number [phone]   Secondary Decision Maker Relationship to Patient Unknown   Confirm Advance Directive Yes, on file   Patient Would Like to Complete Advance Directive -   Does the patient have other document types -       Any spiritual / Congregation concerns:  [] Yes /  [x] No    Caregiver Burnout:  [] Yes /  [x] No /  [] No Caregiver Present      Anticipatory grief assessment:   [x] Normal  / [] Maladaptive       ESAS Anxiety: Anxiety: 1    ESAS Depression: Depression: 2        REVIEW OF SYSTEMS:     Positive and pertinent negative findings in ROS are noted above in HPI. The following systems were [x] reviewed / [] unable to be reviewed as noted in HPI  Other findings are noted below.   Systems: constitutional, ears/nose/mouth/throat, respiratory, gastrointestinal, genitourinary, musculoskeletal, integumentary, neurologic, psychiatric, endocrine. Positive findings noted below. Modified ESAS Completed by: provider   Fatigue: 3 Drowsiness: 0   Depression: 2 Pain: 8   Anxiety: 1 Nausea: 0   Anorexia: 8 Dyspnea: 0     Constipation: Yes              PHYSICAL EXAM:     From RN flowsheet:  Wt Readings from Last 3 Encounters:   03/26/18 345 lb 14.4 oz (156.9 kg)   03/05/18 304 lb 3.2 oz (138 kg)   02/28/18 306 lb (138.8 kg)     Blood pressure (!) 79/51, pulse (!) 108, temperature 98.2 °F (36.8 °C), resp. rate 19, height 5' 4\" (1.626 m), weight 345 lb 14.4 oz (156.9 kg), SpO2 100 %.     Pain Scale 1: Numeric (0 - 10)  Pain Intensity 1: 4  Pain Onset 1: intermittent  Pain Location 1: Abdomen  Pain Orientation 1: Anterior, Mid  Pain Description 1: Stabbing  Pain Intervention(s) 1: Repositioned  Last bowel movement, if known: stool output in ostomy     Constitutional: awake, alert, oriented, no sedation or confusion  Eyes: pupils equal, anicteric  ENMT: no nasal discharge, moist mucous membranes  Cardiovascular: regular rhythm  Respiratory: breathing not labored, clear anteriorly   Gastrointestinal: soft , hypoactive bowel sounds, ostomy, midline incisions bandaged    Musculoskeletal: no deformity  Skin: warm, dry, pale  Ext: B/l pitting LE edema  Neurologic: following commands, moving all extremities  Psychiatric: full affect, no hallucinations         HISTORY:     Active Problems:    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)      Past Medical History:   Diagnosis Date    Adverse effect of anesthesia     WOKE DURING SURGERY    Arthritis     Blood clot in vein 2017    STOMACH    Crohn's disease ([address] 8/15/2011    DVT (deep venous thrombosis) [address] [address] 8/15/2011    LEFT LEG    Edema     GENERALIZED R/T TPN; WAIST DOWN    Incarcerated ventral hernia 8/15/2011    Lupus     Lyme disease     Psychiatric disorder     ANXIETY    Right flank pain 8/22/2011    Seizures ([address] 1990    LAST AT AGE 17  Stroke [facility]) 1990    age 17; A WEEK POST OP, HAD SEIZURES AND STROKE, WAS IN COMA FOR A MONTH    Thyroid disease     HYPO-NO MEDS      Past Surgical History:   Procedure Laterality Date    HX APPENDECTOMY      HX GYN  2015    HIDRADENTIS LABIA    HX OTHER SURGICAL      multiple procedures related to her Crohn's disease    HX OTHER SURGICAL      ABDOMINAL SURGERY REMOVING COLON, 2/3 STOMACH, 1/3 SMALL INTESTINE    MI LAP, INCISIONAL HERNIA REPAIR,INCARCERATED  9-10-08    dr. Tanya Barragan      Family History   Problem Relation Age of Onset    Hypertension Father     Stroke Father     Other Father      arthritis    Arthritis-osteo Father     Hypertension Mother     Arthritis-osteo Mother     Anesth Problems Neg Hx       History reviewed, no pertinent family history.   Social History   Substance Use Topics    Smoking status: Former Smoker     Packs/day: 1.00     Years: 16.00     Quit date: 2/27/2017    Smokeless tobacco: Former User      Comment: OFF AND ON    Alcohol use No     Allergies   Allergen Reactions    Sulfa (Sulfonamide Antibiotics) Anaphylaxis    Demerol [Meperidine] Rash    Soma [Carisoprodol] Rash and Nausea Only    Toradol [Ketorolac Tromethamine] Nausea and Vomiting      Current Facility-Administered Medications   Medication Dose Route Frequency    TPN ADULT - CENTRAL   IntraVENous CONTINUOUS    TPN ADULT - CENTRAL   IntraVENous CONTINUOUS    morphine 5 mg  5 mg IntraVENous Q3H PRN    aspirin (ASPIRIN) tablet 325 mg  325 mg Oral DAILY    HYDROmorphone (PF) 25 mg/50 mL (DILAUDID) PCA   IntraVENous CONTINUOUS    0.9% sodium chloride infusion 250 mL  250 mL IntraVENous PRN    acetaminophen (TYLENOL) tablet 650 mg  650 mg Oral Q6H PRN    diphenhydrAMINE (BENADRYL) capsule 25 mg  25 mg Oral Q6H PRN    enoxaparin (LOVENOX) injection 40 mg  40 mg SubCUTAneous Q24H    octreotide (SANDOSTATIN) injection 50 mcg  50 mcg IntraVENous TID    fat emulsion 20% (LIPOSYN, INTRAlipid) infusion 250 mL  250 mL IntraVENous Q MON, WED & FRI    0.9% sodium chloride infusion 250 mL  250 mL IntraVENous PRN    0.9% sodium chloride infusion 250 mL  250 mL IntraVENous PRN    nystatin (MYCOSTATIN) 100,000 unit/gram cream   Topical BID    prochlorperazine (COMPAZINE) with saline injection 5 mg  5 mg IntraVENous Q6H PRN    naloxone (NARCAN) injection 0.4 mg  0.4 mg IntraVENous EVERY 2 MINUTES AS NEEDED    0.9% sodium chloride infusion 250 mL  250 mL IntraVENous PRN    glucose chewable tablet 16 g  4 Tab Oral PRN    dextrose (D50W) injection syrg 12.5-25 g  12.5-25 g IntraVENous PRN    glucagon (GLUCAGEN) injection 1 mg  1 mg IntraMUSCular PRN    insulin lispro (HUMALOG) injection   SubCUTAneous Q6H    scopolamine (TRANSDERM-SCOP) 1 mg over 3 days 1 Patch  1 Patch TransDERmal Q72H    sodium chloride (NS) flush 20 mL  20 mL InterCATHeter PRN    sodium chloride (NS) flush 10 mL  10 mL InterCATHeter Q24H    sodium chloride (NS) flush 10 mL  10 mL InterCATHeter PRN    sodium chloride (NS) flush 10 mL  10 mL InterCATHeter Q8H    alteplase (CATHFLO) 1 mg in sterile water (preservative free) 1 mL injection  1 mg InterCATHeter PRN    bacitracin 500 unit/gram packet 1 Packet  1 Packet Topical PRN    sodium chloride (NS) flush 20 mL  20 mL InterCATHeter PRN    sodium chloride (NS) flush 10 mL  10 mL InterCATHeter Q24H    sodium chloride (NS) flush 10 mL  10 mL InterCATHeter PRN    sodium chloride (NS) flush 10 mL  10 mL InterCATHeter Q8H    dronabinol (MARINOL) capsule 2.5 mg  2.5 mg Oral BID    phenol throat spray (CHLORASEPTIC) 1 Spray  1 Spray Oral PRN    ondansetron (ZOFRAN) injection 4 mg  4 mg IntraVENous Q4H PRN    promethazine (PHENERGAN) 12.5 mg in 0.9% sodium chloride 50 mL IVPB  12.5 mg IntraVENous Q6H PRN    LORazepam (ATIVAN) injection 1 mg  1 mg IntraVENous Q6H PRN    albuterol (PROVENTIL VENTOLIN) nebulizer solution 2.5 mg  2.5 mg Nebulization Q4H PRN          LAB AND IMAGING FINDINGS: Lab Results   Component Value Date/Time    WBC 9.5 03/26/2018 04:06 AM    HGB 7.1 (L) 03/26/2018 04:06 AM    PLATELET 319 38/39/8301 04:06 AM     Lab Results   Component Value Date/Time    Sodium 139 03/26/2018 04:06 AM    Potassium 3.8 03/26/2018 04:06 AM    Chloride 105 03/26/2018 04:06 AM    CO2 27 03/26/2018 04:06 AM    BUN 14 03/26/2018 04:06 AM    Creatinine 0.55 03/26/2018 04:06 AM    Calcium 8.2 (L) 03/26/2018 04:06 AM    Magnesium 1.9 03/26/2018 04:06 AM    Phosphorus 4.7 03/23/2018 05:36 AM      Lab Results   Component Value Date/Time    AST (SGOT) 13 (L) 03/24/2018 04:08 AM    Alk. phosphatase 94 03/24/2018 04:08 AM    Protein, total 6.2 (L) 03/24/2018 04:08 AM    Albumin 1.1 (L) 03/24/2018 04:08 AM    Globulin 5.1 (H) 03/24/2018 04:08 AM     Lab Results   Component Value Date/Time    INR 1.0 12/07/2017 08:37 AM    Prothrombin time 10.0 12/07/2017 08:37 AM      No results found for: IRON, FE, TIBC, IBCT, PSAT, FERR   No results found for: PH, PCO2, PO2  No components found for: GLPOC   No results found for: CPK, CKMB             Total time: 35 mins  Counseling / coordination time, spent as noted above: 20 mins  > 50% counseling / coordination?:     Prolonged service was provided for  []30 min   []75 min in face to face time in the presence of the patient, spent as noted above. Time Start:   Time End:   Note: this can only be billed with 10865 (initial) or 78329 (follow up). If multiple start / stop times, list each separately.

## 2018-03-26 NOTE — INTERDISCIPLINARY ROUNDS
Interdisciplinary team rounds were held 3/26/2018 with the following team members:Care Management, Nutrition, Clinical Coordinator and [address] and the patient. Plan of care discussed. See clinical pathway and/or care plan for interventions and desired outcomes. Discussion included possible transfer to long term care hospital at [facility] for recovery. Day 19 of this admission.

## 2018-03-26 NOTE — WOUND CARE
WOCN Note:     Follow-up visit for abdominal wound. Chart shows:  Admitted for fistula takdown 3/7/18 by Dr. Tifafnie Castañeda with [facility] placement in [place]; history of multiple abdominal surgeries and Crohns disease. Admitted from home. Seen today with Marija Pina NP.      Assessment:   Patient is A&O x 4 and mobile but debilitated s/p abdominal surgery. She uses the female urinal.   Bed: total care bariatric   Using PCA for pain.       1. Midline abdominal surgical wound = 24 x 15 x 5 cm. 90% pink moist wound base and 5% scattered devitalized stained areas of green and 5% stomatized mucus membrane with peristalsis. Loose sutures noted distally. Dark green in wall cannister that is connected via 2 red rubber caths resting in central and distal wound bed. Wound base is protected from red rubber caths by contact layer of Mepitel One  2 rolls of moist gauze packing placed around and red rubber secured to abdomen with tape and connected to wall suction @ 50 with \"Y\" connector. ABD toppers and secured with tape.       Colostomy: stoma is red & moist; Small amount of mucoid green output; 2-piece flat pouch with good seal and not changed by me today.      Wound Recommendations:    Continue moist packing as ordered. May be able to isolate stoma in a few days and apply VAC to rest of wound. Will check again Thursday to see if she is ready for a VAC. Skin Care & Pressure Relief Recommendations:  Minimize layers of linen/pads under patient to optimize support surface. Turn/reposition approximately every 2 hours and offload heels. Manage incontinence / promote continence; Aloe Vesta to buttocks and sacrum daily and as needed  Bariatric bed    Discussed above plan with patient.     Transition of Care: Plan to follow as needed while admitted to hospital.    YOLANDA AguileraN, RN, [facility] [place]  Certified Wound, Ostomy, Continence Nurse  office [phone]  pager [phone] or call  to page

## 2018-03-26 NOTE — PROGRESS NOTES
Problem: Falls - Risk of  Goal: *Absence of Falls  Document Marlene Fall Risk and appropriate interventions in the flowsheet.    Outcome: Progressing Towards Goal  Fall Risk Interventions:  Mobility Interventions: Communicate number of staff needed for ambulation/transfer         Medication Interventions: Teach patient to arise slowly    Elimination Interventions: Call light in reach, Toileting schedule/hourly rounds    History of Falls Interventions: Door open when patient unattended

## 2018-03-27 LAB
ERYTHROCYTE [DISTWIDTH] IN BLOOD BY AUTOMATED COUNT: 17.7 % (ref 11.5–14.5)
GLUCOSE BLD STRIP.AUTO-MCNC: 148 MG/DL (ref 65–100)
GLUCOSE BLD STRIP.AUTO-MCNC: 154 MG/DL (ref 65–100)
GLUCOSE BLD STRIP.AUTO-MCNC: 164 MG/DL (ref 65–100)
HCT VFR BLD AUTO: 23.6 % (ref 35–47)
HGB BLD-MCNC: 7.5 G/DL (ref 11.5–16)
MCH RBC QN AUTO: 30.4 PG (ref 26–34)
MCHC RBC AUTO-ENTMCNC: 31.8 G/DL (ref 30–36.5)
MCV RBC AUTO: 95.5 FL (ref 80–99)
NRBC # BLD: 0.06 K/UL (ref 0–0.01)
NRBC BLD-RTO: 0.6 PER 100 WBC
PLATELET # BLD AUTO: 325 K/UL (ref 150–400)
PMV BLD AUTO: 10.3 FL (ref 8.9–12.9)
RBC # BLD AUTO: 2.47 M/UL (ref 3.8–5.2)
SERVICE CMNT-IMP: ABNORMAL
WBC # BLD AUTO: 10.3 K/UL (ref 3.6–11)

## 2018-03-27 PROCEDURE — 3331090002 HH PPS REVENUE DEBIT

## 2018-03-27 PROCEDURE — 97530 THERAPEUTIC ACTIVITIES: CPT

## 2018-03-27 PROCEDURE — 74011250636 HC RX REV CODE- 250/636: Performed by: NURSE PRACTITIONER

## 2018-03-27 PROCEDURE — 74011250636 HC RX REV CODE- 250/636: Performed by: SURGERY

## 2018-03-27 PROCEDURE — 74011636637 HC RX REV CODE- 636/637: Performed by: SURGERY

## 2018-03-27 PROCEDURE — 74011000250 HC RX REV CODE- 250: Performed by: SURGERY

## 2018-03-27 PROCEDURE — 74011250636 HC RX REV CODE- 250/636: Performed by: EMERGENCY MEDICINE

## 2018-03-27 PROCEDURE — 36415 COLL VENOUS BLD VENIPUNCTURE: CPT | Performed by: SURGERY

## 2018-03-27 PROCEDURE — 3331090001 HH PPS REVENUE CREDIT

## 2018-03-27 PROCEDURE — 74011250637 HC RX REV CODE- 250/637: Performed by: INTERNAL MEDICINE

## 2018-03-27 PROCEDURE — 74011250637 HC RX REV CODE- 250/637: Performed by: NURSE PRACTITIONER

## 2018-03-27 PROCEDURE — 74011250637 HC RX REV CODE- 250/637: Performed by: SURGERY

## 2018-03-27 PROCEDURE — 74011250637 HC RX REV CODE- 250/637: Performed by: PHYSICIAN ASSISTANT

## 2018-03-27 PROCEDURE — 82962 GLUCOSE BLOOD TEST: CPT

## 2018-03-27 PROCEDURE — 85027 COMPLETE CBC AUTOMATED: CPT | Performed by: SURGERY

## 2018-03-27 PROCEDURE — 65660000000 HC RM CCU STEPDOWN

## 2018-03-27 PROCEDURE — 74011250636 HC RX REV CODE- 250/636: Performed by: INTERNAL MEDICINE

## 2018-03-27 PROCEDURE — 74011000258 HC RX REV CODE- 258: Performed by: SURGERY

## 2018-03-27 RX ORDER — MORPHINE SULFATE 100 MG/1
100 TABLET, FILM COATED, EXTENDED RELEASE ORAL 3 TIMES DAILY
Status: DISCONTINUED | OUTPATIENT
Start: 2018-03-27 | End: 2018-03-28

## 2018-03-27 RX ORDER — MORPHINE SULFATE 4 MG/ML
5 INJECTION, SOLUTION INTRAMUSCULAR; INTRAVENOUS
Status: DISCONTINUED | OUTPATIENT
Start: 2018-03-27 | End: 2018-04-03

## 2018-03-27 RX ADMIN — DRONABINOL 2.5 MG: 2.5 CAPSULE ORAL at 09:40

## 2018-03-27 RX ADMIN — OCTREOTIDE ACETATE 50 MCG: 50 INJECTION, SOLUTION INTRAVENOUS; SUBCUTANEOUS at 15:40

## 2018-03-27 RX ADMIN — SODIUM CHLORIDE 500 ML: 900 INJECTION, SOLUTION INTRAVENOUS at 13:02

## 2018-03-27 RX ADMIN — Medication: at 22:27

## 2018-03-27 RX ADMIN — OCTREOTIDE ACETATE 50 MCG: 50 INJECTION, SOLUTION INTRAVENOUS; SUBCUTANEOUS at 09:40

## 2018-03-27 RX ADMIN — INSULIN LISPRO 3 UNITS: 100 INJECTION, SOLUTION INTRAVENOUS; SUBCUTANEOUS at 08:25

## 2018-03-27 RX ADMIN — ONDANSETRON HYDROCHLORIDE 4 MG: 2 INJECTION INTRAMUSCULAR; INTRAVENOUS at 22:28

## 2018-03-27 RX ADMIN — MORPHINE SULFATE 100 MG: 100 TABLET, EXTENDED RELEASE ORAL at 15:40

## 2018-03-27 RX ADMIN — ONDANSETRON HYDROCHLORIDE 4 MG: 2 INJECTION INTRAMUSCULAR; INTRAVENOUS at 09:40

## 2018-03-27 RX ADMIN — ENOXAPARIN SODIUM 40 MG: 100 INJECTION SUBCUTANEOUS at 09:41

## 2018-03-27 RX ADMIN — Medication 10 ML: at 09:42

## 2018-03-27 RX ADMIN — MORPHINE SULFATE 5 MG: 4 INJECTION, SOLUTION INTRAMUSCULAR; INTRAVENOUS at 18:16

## 2018-03-27 RX ADMIN — INSULIN LISPRO 3 UNITS: 100 INJECTION, SOLUTION INTRAVENOUS; SUBCUTANEOUS at 18:14

## 2018-03-27 RX ADMIN — OCTREOTIDE ACETATE 50 MCG: 50 INJECTION, SOLUTION INTRAVENOUS; SUBCUTANEOUS at 22:28

## 2018-03-27 RX ADMIN — ONDANSETRON HYDROCHLORIDE 4 MG: 2 INJECTION INTRAMUSCULAR; INTRAVENOUS at 15:39

## 2018-03-27 RX ADMIN — ASCORBIC ACID: 500 INJECTION, SOLUTION INTRAMUSCULAR; INTRAVENOUS; SUBCUTANEOUS at 18:37

## 2018-03-27 RX ADMIN — NYSTATIN: 100000 CREAM TOPICAL at 10:36

## 2018-03-27 RX ADMIN — NYSTATIN: 100000 CREAM TOPICAL at 18:37

## 2018-03-27 RX ADMIN — ASPIRIN 325 MG: 325 TABLET ORAL at 09:40

## 2018-03-27 RX ADMIN — Medication: at 09:59

## 2018-03-27 RX ADMIN — MORPHINE SULFATE 100 MG: 100 TABLET, EXTENDED RELEASE ORAL at 22:28

## 2018-03-27 RX ADMIN — ACETAMINOPHEN 650 MG: 325 TABLET, FILM COATED ORAL at 22:28

## 2018-03-27 RX ADMIN — INSULIN LISPRO 3 UNITS: 100 INJECTION, SOLUTION INTRAVENOUS; SUBCUTANEOUS at 11:28

## 2018-03-27 RX ADMIN — MORPHINE SULFATE 5 MG: 2 INJECTION, SOLUTION INTRAMUSCULAR; INTRAVENOUS at 09:50

## 2018-03-27 RX ADMIN — DRONABINOL 2.5 MG: 2.5 CAPSULE ORAL at 18:14

## 2018-03-27 NOTE — PROGRESS NOTES
6013 phoned pharmacy, advised the morphine syringes are not scanning a 2mg & 4 mg; dose ordered is  5mg;  they are going to change the way the medication is pulled so the medication can be scanned (2) 4mg syringes.  Cayetano Espinal

## 2018-03-27 NOTE — PROGRESS NOTES
Follow up visit with Amos Luque. Pt was sitting in chair at time of visit. She was tearful at times, but also able to smile some as she reflected on her experiences and her coping. She shared some concerns as well as some hopes. She expressed feelings of uncertainty and fear as she thinks about the future. Emotional support offered, normalizing her feelings and exploring her hope and sources of coping. Pt receives visits from the [facility]. Her mother and her best friend are also an important part of her support system. Offered assurance of prayers as our visit came to a close. Will continue to follow as able/needed.     Bhumika Landa, Palliative

## 2018-03-27 NOTE — PROGRESS NOTES
Problem: Mobility Impaired (Adult and Pediatric)  Goal: *Acute Goals and Plan of Care (Insert Text)  Physical Therapy Goals  Revised 3/27/2018  1. Patient will move from supine to sit and sit to supine  in bed with moderate assistance  within 7 day(s). 2.  Patient will transfer from bed to chair and chair to bed with moderate assistance  using the least restrictive device within 7 day(s). 3.  Patient will perform sit to stand with moderate assistance  within 7 day(s). 4.  Patient will ambulate 100' with moderate assist x1 using the least restrictive device within 7 day(s). Revisited 3/19/2018, goals remain appropriate, carry over    Physical Therapy Goals  Initiated 3/8/2018  1. Patient will move from supine to sit and sit to supine  in bed with moderate assistance  within 7 day(s). 2.  Patient will transfer from bed to chair and chair to bed with moderate assistance  using the least restrictive device within 7 day(s). 3.  Patient will perform sit to stand with moderate assistance  within 7 day(s). 4.  PT will assess gait with the least restrictive device within 7 day(s). physical Therapy REEVALUATION  Patient: Alexis Stearns (38 y.o. female)  Date: 3/27/2018  Primary Diagnosis: FISTULA  Small bowel fistula  Procedure(s) (LRB):  EXPLORATORY LAPAROTOMY, LYSIS OF ADHESION X 5HOURS, SMALL BOWEL FISTULA TAKE DOWN X 2 AND WOUND VAC PLACEMENT (N/A) 20 Days Post-Op   Precautions: Contact     Chart, physical therapy assessment, plan of care and goals were reviewed. ASSESSMENT :  Based on the objective data described below, the patient presents with high pain levels (hx of chronic pain), anxiety, decreased balance, and endurance. This patient has made stow progress so far but demonstrated the ability to stand and side step to/from a bedside chair today. Noted high anxiety and panic when initially standing waving her hands in the air reaching for neither the therapist or her RW.  Once this passed, she was able to side step to bed with increased cues for safety and sequencing. She expressed concern regarding her knees buckling but none noted. Returned to bed in NAD. Discharge recommendations TBD but anticipate rehab unless progress is made when medically stable. Patient will benefit from skilled intervention to address the above impairments. Patients rehabilitation potential is considered to be Good  Factors which may influence rehabilitation potential include:   []           None noted  []           Mental ability/status  [x]           Medical comorbidities  []           Home/family situation and support systems  []           Safety awareness  []           Pain tolerance/management  []           Other:      PLAN :  Recommendations and Planned Interventions:  [x]             Bed Mobility Training             []      Neuromuscular Re-Education  []             Transfer Training                   []      Orthotic/Prosthetic Training  []             Gait Training                         []      Modalities  []             Therapeutic Exercises           []      Edema Management/Control  []             Therapeutic Activities            []      Patient and Family Training/Education  []             Other (comment):  Frequency/Duration: Patient will be followed by physical therapy 5 times a week to address goals. Discharge Recommendations: Rehab  Further Equipment Recommendations for Discharge: to be determined       SUBJECTIVE:   Patient stated I'm going to fall!     OBJECTIVE DATA SUMMARY:     Past Medical History:   Diagnosis Date    Adverse effect of anesthesia     WOKE DURING SURGERY    Arthritis     Blood clot in vein 2017    STOMACH    Crohn's disease ([facility] [address] 8/15/2011    DVT (deep venous thrombosis) ([facility] [address] 8/15/2011    LEFT LEG    Edema     GENERALIZED R/T TPN; WAIST DOWN    Incarcerated ventral hernia 8/15/2011    Lupus     Lyme disease     Psychiatric disorder     ANXIETY    Right flank pain 8/22/2011    Seizures ([address] 1990    LAST AT AGE 17    Stroke [facility]) 1990    age 17; A WEEK POST OP, HAD SEIZURES AND STROKE, WAS IN COMA FOR A MONTH    Thyroid disease     HYPO-NO MEDS     Past Surgical History:   Procedure Laterality Date    HX APPENDECTOMY      HX GYN  2015    HIDRADENTIS LABIA    HX OTHER SURGICAL      multiple procedures related to her Crohn's disease    HX OTHER SURGICAL      ABDOMINAL SURGERY REMOVING COLON, 2/3 STOMACH, 1/3 SMALL INTESTINE    [place] LAP, INCISIONAL HERNIA REPAIR,INCARCERATED  9-10-08    dr. Margaree Leventhal course since last seen and reason for reevaluation: weekly re-eval  Critical Behavior:  Neurologic State: Alert  Orientation Level: Oriented X4  Cognition: Appropriate decision making, Appropriate for age attention/concentration, Appropriate safety awareness, Follows commands     Skin:  Dressings intact as observed  Strength:    Strength: Generally decreased, functional                      Tone & Sensation:                  Sensation: Intact               Range Of Motion:  AROM: Generally decreased, functional                       Coordination:  Coordination: Within functional limits    Functional Mobility:  Bed Mobility:              Transfers:  Sit to Stand: Moderate assistance;Assist x2; Additional time  Stand to Sit: Moderate assistance;Assist x2                    Balance:   Sitting: Impaired; Without support  Sitting - Static: Fair (occasional)  Sitting - Dynamic: Fair (occasional)  Ambulation/Gait Training:  Distance (ft): 6 Feet (ft)  Assistive Device: Gait belt;Walker, rolling  Ambulation - Level of Assistance:  Moderate assistance;Assist x2     Gait Description (WDL): Exceptions to WDL  Gait Abnormalities: Decreased step clearance;Trunk sway increased        Base of Support: Widened     Speed/Liz: Slow;Shuffled            Pain:  Pain Scale 1: Numeric (0 - 10)  Pain Intensity 1: 7           Pain Intervention(s) 1: Encouraged PCA  Activity Tolerance: After treatment:   []  Patient left in no apparent distress sitting up in chair  [x]  Patient left in no apparent distress in bed  [x]  Call bell left within reach  [x]  Nursing notified  []  Caregiver present  []  Bed alarm activated    COMMUNICATION/EDUCATION:   The patients plan of care was discussed with: Registered Nurse. [x]  Fall prevention education was provided and the patient/caregiver indicated understanding. [x]  Patient/family have participated as able in goal setting and plan of care. []  Patient/family agree to work toward stated goals and plan of care. []  Patient understands intent and goals of therapy, but is neutral about his/her participation. []  Patient is unable to participate in goal setting and plan of care.     Thank you for this referral.  Shaneka Jaime, PT, DPT   Time Calculation: 18 mins

## 2018-03-27 NOTE — PROGRESS NOTES
Palliative Medicine Consult  [place]: [phone] ([phone])    Patient Name: Dee Wolf  YOB: 1975    Date of Initial Consult: 3/12/18  Reason for Consult: Pain management, chronic and post op   Requesting Provider: Rajni   Primary Care Physician: Julita Conrad MD     SUMMARY:   Dee Wolf is a 38 y.o. with a past history of Crohn's disease (followed by Dr Demond Matthew), mult surgeries s/p total colectomy w/ end ileostomy, 6/2017 ex lap with small bowel perforation, 6/28/17 ex lap, LPA, repair or enterotomy, SMA stent on L, 7/16/2917 ex lap, KATHRINE, fistula exclusion w/ feeding tube placement and prolonged TPN who was admitted on 3/7/2018 from home for planned surgery, s/p ex lap w/ extensive KATHRINE and small bowel fistula take down, wound vac placement. 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 Known to our team during past hospitalizations. Has had lengthy hospital stays w/ mult complications. Has required [facility] stays in past. Has chronic pain from Crohn's disease (and has not been able to tolerate home Crohn's meds recently due to acute issues- had been on steroids, stopped prior to surgery). Discussion of Remicaide in future. Current medical issues leading to Palliative Medicine involvement include: pain management. Patient's mother, Kiya Shannon is NOK.  reviewed, no abberrancies- one prescriber for opioids and one pharmacy. Most recently filled 3/9/18: MSContin 100mg tid and Morphine IR 30mg- 8 tabs a day. Has been tried on equivalent dose of Fentanyl patch w/out same benefit. Discussed Methadone but would have to check w/ her prescriber. PALLIATIVE DIAGNOSES:   Acute post operative pain in abdomen(EXPLORATORY LAPAROTOMY, LYSIS OF ADHESION X 5HOURS, SMALL BOWEL FISTULA TAKE DOWN X 2 AND WOUND VAC PLACEMENT day 19th )   1. Chronic abdominal pain from Cronh's disease and hx of surgeries  2. Nausea  3. Anxiety   4.  Feeding difficulties due to medical condition- has been on TPN for several months  5. Edema   6. L ankle pain s/p fall, normal XRs- improving   7. Grief/depressive sx   8. Constipation   9. Opioid tolerance      PLAN:     Abd pain from surgical incision and wound care as well as underlying chronic pain from Crohn's which is worse when off her steroids. Has high opioid tolerance, no aberrancies on . We have done opioid rotation in the hospital for her in past.     Pain is stable       1. currently Dilaudid PCA 0.6mg/h and 0.5mg IV every 6 min. 2. Discussed with Dr Samantha Bird, will discontinue the continous dilaudid, place her on home dose of ms contin 100 mg tid. 3. Continue with demand dilaudid PCA 0.5 mg I/v q 6 mins. 4.Continue morphine 5mg IV q3h for severe pain; can use if SBP > 90; if SBP < 90, would recheck a few times, if persistently low would consider notifying primary service    5. Following to assist w/ transition to po and for emotional support as pt needs. PC has spoken w/ Dr Alicia Quesada (sees pt for her pain) and he is okay w/ continuing home regimen upon discharge if needed. Dr. Elsy Seals explained to pt she does not recommend higher doses when she leaves. 6. I spoke to her mom Geraldo Turner today (3/27 /18), She will check how many days of pain pills she has at home , her mom visit her in the evening after work, and she will leave a note of pill count at home with patient , encouraged,  mom to make an appointment with Dr Belinda Klein , her mom is waiting what is plan of discrge time frame , in speaking to dr Samantha Bird, patient may be able to discharged in a week time . If she needs a few days Rx to bridge her, PC can do this for her as we  know her hx well. 7.Bowel regimen, anti-emetics per surgery, nausea with octreotide( nausea side effect  (5% to 61% )UPTODATE. 4. Nutrition : on chronic TPN    5.  Psychosocial/spritual : her mom is main support, patient is fairly coping with positive attitude , her  is in close touch with her . Communicated plan of care with: Palliative IDT; bed side Rn April. GOALS OF CARE / TREATMENT PREFERENCES:     GOALS OF CARE:  Patient/Health Care Proxy Stated Goals:  (treatment of acute medical issues and pain control)      TREATMENT PREFERENCES:   Code Status: Full Code    Advance Care Planning:  Advance Care Planning 3/15/2018   Patient's Healthcare Decision Maker is: Named in scanned ACP document   Primary Decision Maker Name Arnold Negron   Primary Decision Maker Phone Number [phone]   Primary Decision Maker Relationship to Patient Parent   Secondary Decision Maker Name Raymond Kelly   Secondary Decision Maker Phone Number [phone]   Secondary Decision Maker Relationship to Patient Unknown   Confirm Advance Directive Yes, on file   Patient Would Like to Complete Advance Directive -   Does the patient have other document types -       Medical Interventions: Full interventions           Other:    As far as possible, the palliative care team has discussed with patient / health care proxy about goals of care / treatment preferences for patient. HISTORY:    patient says pain varies \" up and down \", currently 7/10, her base line pain is 5/10. Notes nausea with octreotide(nause side effect is 5-61%( UPTO DATE )    Blood pressure is at base [id number]. I/v fluids discontinued because of fluid retention and edema. Reviewed vitals, I/O's, labs, imaging, MAR, notes  Tmax 100.3, , on room air, \"alert\"    Na and cr wnl, lft not elevated, albumin 1.1          MAR includes   Tylenol,  last dose is 2300 on 3/24/18. Albuterol prn, no recent dose  Asa 325mg  Benadryl, no recent dose  Lasix 20mg iv daily  Marinol  Dilaudid pca, 0.6mg/hr basal, 0.5mg every 6min pca dose  Morphine 5mg IV q3h PRN, 1900, 23 00 (3/25/18 ) and 0600 3/26  Ativan 1mg IV prn, 0 use in last 48 hours. Zofran, 3 doses in last 24 hours.   Compazine, last dose 2200(3/26/18 )  Phenergan, no recent dose  Octreotide. Scopolamine patch        HPI/SUBJECTIVE:    The patient is:   [x] Verbal and participatory  [] Non-participatory due to:     Pain severe this morning during my visit -- pca has run out, awaiting new cartridge. RN getting PRN morphine dose now. No nausea, no sob. 3/26/18: pain is up and down , 7/10 now, base line pain is 5/10, she reports nausea with octeotide, relived with zofran.    3/27/18:  Pain is stable , 6/10, able to sleep, this morning pain is 8/10. Nausea with octreotide controlled with zofran . Clinical Pain Assessment (nonverbal scale for severity on nonverbal patients):   Clinical Pain Assessment  Severity: 7  Location: abdomen   Character: aching , sharp  Duration: chronic , and since surgery  Effect: movemnt can increase pain . Factors: incraese in pain with wound care.   Frequency: constant          Duration: for how long has pt been experiencing pain (e.g., 2 days, 1 month, years)  Frequency: how often pain is an issue (e.g., several times per day, once every few days, constant)     FUNCTIONAL ASSESSMENT:     Palliative Performance Scale (PPS):  PPS: 50       PSYCHOSOCIAL/SPIRITUAL SCREENING:     Palliative IDT has assessed this patient for cultural preferences / practices and a referral made as appropriate to needs (Cultural Services, Patient Advocacy, Ethics, etc.)    Advance Care Planning:  Advance Care Planning 3/15/2018   Patient's Healthcare Decision Maker is: Named in scanned ACP document   Primary Decision Maker Name Eden Perry   Primary Decision Maker Phone Number [phone]   Primary Decision Maker Relationship to Patient Parent   Secondary Decision Maker Name Elena Virgen   Secondary Decision Maker Phone Number [phone]   Secondary Decision Maker Relationship to Patient Unknown   Confirm Advance Directive Yes, on file   Patient Would Like to Complete Advance Directive -   Does the patient have other document types -       Any spiritual / Congregational concerns:  [] Yes /  [x] No    Caregiver Burnout:  [] Yes /  [x] No /  [] No Caregiver Present      Anticipatory grief assessment:   [x] Normal  / [] Maladaptive       ESAS Anxiety: Anxiety: 0    ESAS Depression: Depression: 0        REVIEW OF SYSTEMS:     Positive and pertinent negative findings in ROS are noted above in HPI. The following systems were [x] reviewed / [] unable to be reviewed as noted in HPI  Other findings are noted below. Systems: constitutional, ears/nose/mouth/throat, respiratory, gastrointestinal, genitourinary, musculoskeletal, integumentary, neurologic, psychiatric, endocrine. Positive findings noted below. Modified ESAS Completed by: provider   Fatigue: 5 Drowsiness: 0   Depression: 0 Pain: 7   Anxiety: 0 Nausea: 2 (only after taking octreotide.)   Anorexia: 3 Dyspnea: 0     Constipation: No              PHYSICAL EXAM:     From RN flowsheet:  Wt Readings from Last 3 Encounters:   03/27/18 341 lb 11.4 oz (155 kg)   03/05/18 304 lb 3.2 oz (138 kg)   02/28/18 306 lb (138.8 kg)     Blood pressure (!) 86/61, pulse (!) 116, temperature 99.1 °F (37.3 °C), resp. rate 18, height 5' 4\" (1.626 m), weight 341 lb 11.4 oz (155 kg), SpO2 97 %. Pain Scale 1: Numeric (0 - 10)  Pain Intensity 1: 8  Pain Onset 1: constant  Pain Location 1: Abdomen  Pain Orientation 1: Anterior  Pain Description 1: Aching, Sore  Pain Intervention(s) 1: Medication (see MAR)  Last bowel movement, if known: stool output in ostomy     Constitutional: awake, alert, oriented, engages in conversation , obese built. Eyes: pupils equal, anicteric  ENMT: moist mucous membranes  Cardiovascular: regular rhythm  Respiratory: breathing not labored, clear anteriorly   Gastrointestinal: soft , hypoactive bowel sounds, colostomy bag in place, big midline incision , with dressing .    Musculoskeletal: no deformity  Skin: warm, dry, pale  Ext: B/l pitting LE edema  Neurologic: following commands, moving all extremities  Psychiatric: full affect, no hallucinations         HISTORY:     Active Problems:    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)      Past Medical History:   Diagnosis Date    Adverse effect of anesthesia     WOKE DURING SURGERY    Arthritis     Blood clot in vein 2017    STOMACH    Crohn's disease ([facility] [address] 8/15/2011    DVT (deep venous thrombosis) ([facility] [address] 8/15/2011    LEFT LEG    Edema     GENERALIZED R/T TPN; WAIST DOWN    Incarcerated ventral hernia 8/15/2011    Lupus     Lyme disease     Psychiatric disorder     ANXIETY    Right flank pain 8/22/2011    Seizures ([facility] [address] 1990    LAST AT AGE 17    Stroke [facility]) 1990    age 17; A WEEK POST OP, HAD SEIZURES AND STROKE, WAS IN COMA FOR A MONTH    Thyroid disease     HYPO-NO MEDS      Past Surgical History:   Procedure Laterality Date    HX APPENDECTOMY      HX GYN  2015    HIDRADENTIS LABIA    HX OTHER SURGICAL      multiple procedures related to her Crohn's disease    HX OTHER SURGICAL      ABDOMINAL SURGERY REMOVING COLON, 2/3 STOMACH, 1/3 SMALL INTESTINE    [place] LAP, INCISIONAL HERNIA REPAIR,INCARCERATED  9-10-08    dr. Wendy Klein      Family History   Problem Relation Age of Onset    Hypertension Father     Stroke Father     Other Father      arthritis    Arthritis-osteo Father     Hypertension Mother     Arthritis-osteo Mother     Anesth Problems Neg Hx       History reviewed, no pertinent family history.   Social History   Substance Use Topics    Smoking status: Former Smoker     Packs/day: 1.00     Years: 16.00     Quit date: 2/27/2017    Smokeless tobacco: Former User      Comment: OFF AND ON    Alcohol use No     Allergies   Allergen Reactions    Sulfa (Sulfonamide Antibiotics) Anaphylaxis    Demerol [Meperidine] Rash    Soma [Carisoprodol] Rash and Nausea Only    Toradol [Ketorolac Tromethamine] Nausea and Vomiting      Current Facility-Administered Medications   Medication Dose Route Frequency    TPN ADULT - CENTRAL   IntraVENous CONTINUOUS    morphine injection 5 mg  5 mg IntraVENous Q3H PRN    TPN ADULT - CENTRAL   IntraVENous CONTINUOUS    aspirin (ASPIRIN) tablet 325 mg  325 mg Oral DAILY    HYDROmorphone (PF) 25 mg/50 mL (DILAUDID) PCA   IntraVENous CONTINUOUS    0.9% sodium chloride infusion 250 mL  250 mL IntraVENous PRN    acetaminophen (TYLENOL) tablet 650 mg  650 mg Oral Q6H PRN    diphenhydrAMINE (BENADRYL) capsule 25 mg  25 mg Oral Q6H PRN    enoxaparin (LOVENOX) injection 40 mg  40 mg SubCUTAneous Q24H    octreotide (SANDOSTATIN) injection 50 mcg  50 mcg IntraVENous TID    fat emulsion 20% (LIPOSYN, INTRAlipid) infusion 250 mL  250 mL IntraVENous Q MON, WED & FRI    0.9% sodium chloride infusion 250 mL  250 mL IntraVENous PRN    0.9% sodium chloride infusion 250 mL  250 mL IntraVENous PRN    nystatin (MYCOSTATIN) 100,000 unit/gram cream   Topical BID    prochlorperazine (COMPAZINE) with saline injection 5 mg  5 mg IntraVENous Q6H PRN    naloxone (NARCAN) injection 0.4 mg  0.4 mg IntraVENous EVERY 2 MINUTES AS NEEDED    0.9% sodium chloride infusion 250 mL  250 mL IntraVENous PRN    glucose chewable tablet 16 g  4 Tab Oral PRN    dextrose (D50W) injection syrg 12.5-25 g  12.5-25 g IntraVENous PRN    glucagon (GLUCAGEN) injection 1 mg  1 mg IntraMUSCular PRN    insulin lispro (HUMALOG) injection   SubCUTAneous Q6H    scopolamine (TRANSDERM-SCOP) 1 mg over 3 days 1 Patch  1 Patch TransDERmal Q72H    sodium chloride (NS) flush 20 mL  20 mL InterCATHeter PRN    sodium chloride (NS) flush 10 mL  10 mL InterCATHeter Q24H    sodium chloride (NS) flush 10 mL  10 mL InterCATHeter PRN    sodium chloride (NS) flush 10 mL  10 mL InterCATHeter Q8H    alteplase (CATHFLO) 1 mg in sterile water (preservative free) 1 mL injection  1 mg InterCATHeter PRN    bacitracin 500 unit/gram packet 1 Packet  1 Packet Topical PRN    sodium chloride (NS) flush 20 mL  20 mL InterCATHeter PRN    sodium chloride (NS) flush 10 mL  10 mL InterCATHeter Q24H    sodium chloride (NS) flush 10 mL  10 mL InterCATHeter PRN    sodium chloride (NS) flush 10 mL  10 mL InterCATHeter Q8H    dronabinol (MARINOL) capsule 2.5 mg  2.5 mg Oral BID    phenol throat spray (CHLORASEPTIC) 1 Spray  1 Spray Oral PRN    ondansetron (ZOFRAN) injection 4 mg  4 mg IntraVENous Q4H PRN    promethazine (PHENERGAN) 12.5 mg in 0.9% sodium chloride 50 mL IVPB  12.5 mg IntraVENous Q6H PRN    LORazepam (ATIVAN) injection 1 mg  1 mg IntraVENous Q6H PRN    albuterol (PROVENTIL VENTOLIN) nebulizer solution 2.5 mg  2.5 mg Nebulization Q4H PRN          LAB AND IMAGING FINDINGS:     Lab Results   Component Value Date/Time    WBC 10.3 03/27/2018 06:13 AM    HGB 7.5 (L) 03/27/2018 06:13 AM    PLATELET 730 44/04/5257 06:13 AM     Lab Results   Component Value Date/Time    Sodium 139 03/26/2018 04:06 AM    Potassium 3.8 03/26/2018 04:06 AM    Chloride 105 03/26/2018 04:06 AM    CO2 27 03/26/2018 04:06 AM    BUN 14 03/26/2018 04:06 AM    Creatinine 0.55 03/26/2018 04:06 AM    Calcium 8.2 (L) 03/26/2018 04:06 AM    Magnesium 1.9 03/26/2018 04:06 AM    Phosphorus 4.7 03/23/2018 05:36 AM      Lab Results   Component Value Date/Time    AST (SGOT) 13 (L) 03/24/2018 04:08 AM    Alk. phosphatase 94 03/24/2018 04:08 AM    Protein, total 6.2 (L) 03/24/2018 04:08 AM    Albumin 1.1 (L) 03/24/2018 04:08 AM    Globulin 5.1 (H) 03/24/2018 04:08 AM     Lab Results   Component Value Date/Time    INR 1.0 12/07/2017 08:37 AM    Prothrombin time 10.0 12/07/2017 08:37 AM      No results found for: IRON, FE, TIBC, IBCT, PSAT, FERR   No results found for: PH, PCO2, PO2  No components found for: GLPOC   No results found for: CPK, CKMB             Total time: 35 mins  Counseling / coordination time, spent as noted above: 20 mins  > 50% counseling / coordination?:     Prolonged service was provided for  []30 min   []75 min in face to face time in the presence of the patient, spent as noted above.   Time Start: Time End:   Note: this can only be billed with 06174 (initial) or 08676 (follow up). If multiple start / stop times, list each separately.

## 2018-03-27 NOTE — PROGRESS NOTES
Progress Note    Patient: Ibeth Walsh MRN: [id number]  SSN: [id number]    YOB: 1975  Age: 38 y.o. Sex: female      Admit Date: 3/7/2018    20 Days Post-Op    Procedure:  Procedure(s):  EXPLORATORY LAPAROTOMY, LYSIS OF ADHESION X 5HOURS, SMALL BOWEL FISTULA TAKE DOWN X 2 AND WOUND VAC PLACEMENT    Subjective:     No acute surgical issues. Pt was moved to chair. Fistula still with bilious output. Minimal output from ileostomy. Pt mildly hypotensive and required fluid bolus.       Objective:     Visit Vitals    BP (!) 79/48 (BP 1 Location: Left arm, BP Patient Position: At rest)    Pulse (!) 110    Temp 98.8 °F (37.1 °C)    Resp 18    Ht 5' 4\" (1.626 m)    Wt 341 lb 11.4 oz (155 kg)    SpO2 97%    BMI 58.65 kg/m2       Temp (24hrs), Av.8 °F (37.1 °C), Min:98.4 °F (36.9 °C), Max:99.4 °F (37.4 °C)        Physical Exam:    Gen:  NAD  Pulm:  Unlabored  Abd:  S/ND/appropriate TTP  Midline wound with bilious output  Ostomy:  Pink, patent and productive     Recent Results (from the past 24 hour(s))   GLUCOSE, POC    Collection Time: 18  5:46 PM   Result Value Ref Range    Glucose (POC) 128 (H) 65 - 100 mg/dL    Performed by REYES ALEXIS    GLUCOSE, POC    Collection Time: 18 11:38 PM   Result Value Ref Range    Glucose (POC) 152 (H) 65 - 100 mg/dL    Performed by Desmond Alfaro    GLUCOSE, POC    Collection Time: 18  6:12 AM   Result Value Ref Range    Glucose (POC) 164 (H) 65 - 100 mg/dL    Performed by Desmond Alfaro    CBC W/O DIFF    Collection Time: 18  6:13 AM   Result Value Ref Range    WBC 10.3 3.6 - 11.0 K/uL    RBC 2.47 (L) 3.80 - 5.20 M/uL    HGB 7.5 (L) 11.5 - 16.0 g/dL    HCT 23.6 (L) 35.0 - 47.0 %    MCV 95.5 80.0 - 99.0 FL    MCH 30.4 26.0 - 34.0 PG    MCHC 31.8 30.0 - 36.5 g/dL    RDW 17.7 (H) 11.5 - 14.5 %    PLATELET 819 240 - 097 K/uL    MPV 10.3 8.9 - 12.9 FL    NRBC 0.6 (H) 0  WBC    ABSOLUTE NRBC 0.06 (H) 0.00 - 0.01 K/uL   GLUCOSE, POC Collection Time: 03/27/18 11:00 AM   Result Value Ref Range    Glucose (POC) 154 (H) 65 - 100 mg/dL    Performed by Leo Sanchez          Assessment:     Hospital Problems  Date Reviewed: 10/27/2017          Codes Class Noted POA    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

## 2018-03-27 NOTE — PROGRESS NOTES
Problem: Falls - Risk of  Goal: *Absence of Falls  Document Marlene Fall Risk and appropriate interventions in the flowsheet.    Outcome: Progressing Towards Goal  Fall Risk Interventions:  Mobility Interventions: Communicate number of staff needed for ambulation/transfer, PT Consult for mobility concerns, PT Consult for assist device competence         Medication Interventions: Patient to call before getting OOB, Utilize gait belt for transfers/ambulation    Elimination Interventions: Call light in reach    History of Falls Interventions: Door open when patient unattended    Pt has had no falls      Problem: Pressure Injury - Risk of  Goal: *Prevention of pressure ulcer  Outcome: Progressing Towards Goal  Pt is on turn team, q2 hour turns

## 2018-03-27 NOTE — PROGRESS NOTES
03/27/18 1123   Vitals   Temp 98.8 °F (37.1 °C)   Temp Source Oral   Pulse (Heart Rate) (!) 110   Heart Rate Source Monitor   Resp Rate 18   O2 Sat (%) 97 %   Level of Consciousness Alert   BP (!) 79/48   MAP (Calculated) (!) 58   BP 1 Location Left arm   BP 1 Method Automatic   BP Patient Position At rest   Cardiac Rhythm Sinus Tach   MEWS Score 4   Alarms Set and Audible Cardiac alarms   Box Number 433   Electrodes Replaced No   Pain 1   Pain Scale 1 Numeric (0 - 10)   Pain Intensity 1 7   Patient Stated Pain Goal 5   Pain Intervention(s) 1 Encouraged PCA   Pt asymptomatic.  Will notify MD.

## 2018-03-27 NOTE — PROGRESS NOTES
Patient had hair washed and bath done. Up with 4 standby assist and gait belt to walk to stretcher chair. Heart rate baseline at 110- now at 140 trending down to 120's.

## 2018-03-27 NOTE — PROGRESS NOTES
Bedside and Verbal shift change report given to april (oncoming nurse) by Dipika Lund (offgoing nurse). Report included the following information SBAR, Kardex, Intake/Output, Recent Results and Cardiac Rhythm sinus tach.

## 2018-03-28 LAB
GLUCOSE BLD STRIP.AUTO-MCNC: 100 MG/DL (ref 65–100)
GLUCOSE BLD STRIP.AUTO-MCNC: 129 MG/DL (ref 65–100)
GLUCOSE BLD STRIP.AUTO-MCNC: 151 MG/DL (ref 65–100)
GLUCOSE BLD STRIP.AUTO-MCNC: 172 MG/DL (ref 65–100)
SERVICE CMNT-IMP: ABNORMAL
SERVICE CMNT-IMP: NORMAL

## 2018-03-28 PROCEDURE — 3331090001 HH PPS REVENUE CREDIT

## 2018-03-28 PROCEDURE — 74011250636 HC RX REV CODE- 250/636: Performed by: NURSE PRACTITIONER

## 2018-03-28 PROCEDURE — 82962 GLUCOSE BLOOD TEST: CPT

## 2018-03-28 PROCEDURE — 3331090002 HH PPS REVENUE DEBIT

## 2018-03-28 PROCEDURE — 74011250637 HC RX REV CODE- 250/637: Performed by: NURSE PRACTITIONER

## 2018-03-28 PROCEDURE — 65660000000 HC RM CCU STEPDOWN

## 2018-03-28 PROCEDURE — 97530 THERAPEUTIC ACTIVITIES: CPT

## 2018-03-28 PROCEDURE — 74011636637 HC RX REV CODE- 636/637: Performed by: SURGERY

## 2018-03-28 PROCEDURE — 97110 THERAPEUTIC EXERCISES: CPT

## 2018-03-28 PROCEDURE — 74011250636 HC RX REV CODE- 250/636: Performed by: INTERNAL MEDICINE

## 2018-03-28 PROCEDURE — 74011000258 HC RX REV CODE- 258: Performed by: SURGERY

## 2018-03-28 PROCEDURE — 74011250637 HC RX REV CODE- 250/637: Performed by: PHYSICIAN ASSISTANT

## 2018-03-28 PROCEDURE — 74011250637 HC RX REV CODE- 250/637: Performed by: INTERNAL MEDICINE

## 2018-03-28 PROCEDURE — 74011250636 HC RX REV CODE- 250/636: Performed by: SURGERY

## 2018-03-28 PROCEDURE — 74011000250 HC RX REV CODE- 250: Performed by: SURGERY

## 2018-03-28 RX ORDER — MORPHINE SULFATE 100 MG/1
100 TABLET, FILM COATED, EXTENDED RELEASE ORAL EVERY 24 HOURS
Status: DISCONTINUED | OUTPATIENT
Start: 2018-03-29 | End: 2018-05-15

## 2018-03-28 RX ADMIN — Medication 10 ML: at 17:10

## 2018-03-28 RX ADMIN — DRONABINOL 2.5 MG: 2.5 CAPSULE ORAL at 08:51

## 2018-03-28 RX ADMIN — ONDANSETRON HYDROCHLORIDE 4 MG: 2 INJECTION INTRAMUSCULAR; INTRAVENOUS at 10:41

## 2018-03-28 RX ADMIN — MORPHINE SULFATE 100 MG: 100 TABLET, EXTENDED RELEASE ORAL at 15:10

## 2018-03-28 RX ADMIN — Medication 10 ML: at 14:36

## 2018-03-28 RX ADMIN — MORPHINE SULFATE 115 MG: 15 TABLET, EXTENDED RELEASE ORAL at 22:49

## 2018-03-28 RX ADMIN — ASPIRIN 325 MG: 325 TABLET ORAL at 08:51

## 2018-03-28 RX ADMIN — OCTREOTIDE ACETATE 50 MCG: 50 INJECTION, SOLUTION INTRAVENOUS; SUBCUTANEOUS at 10:40

## 2018-03-28 RX ADMIN — DRONABINOL 2.5 MG: 2.5 CAPSULE ORAL at 17:08

## 2018-03-28 RX ADMIN — ASCORBIC ACID: 500 INJECTION, SOLUTION INTRAMUSCULAR; INTRAVENOUS; SUBCUTANEOUS at 20:34

## 2018-03-28 RX ADMIN — MORPHINE SULFATE 100 MG: 100 TABLET, EXTENDED RELEASE ORAL at 08:52

## 2018-03-28 RX ADMIN — MORPHINE SULFATE 5 MG: 4 INJECTION, SOLUTION INTRAMUSCULAR; INTRAVENOUS at 01:01

## 2018-03-28 RX ADMIN — ONDANSETRON HYDROCHLORIDE 4 MG: 2 INJECTION INTRAMUSCULAR; INTRAVENOUS at 19:16

## 2018-03-28 RX ADMIN — Medication 10 ML: at 11:11

## 2018-03-28 RX ADMIN — ENOXAPARIN SODIUM 40 MG: 100 INJECTION SUBCUTANEOUS at 10:41

## 2018-03-28 RX ADMIN — NYSTATIN: 100000 CREAM TOPICAL at 08:54

## 2018-03-28 RX ADMIN — LORAZEPAM 1 MG: 2 INJECTION INTRAMUSCULAR; INTRAVENOUS at 17:08

## 2018-03-28 RX ADMIN — OCTREOTIDE ACETATE 50 MCG: 50 INJECTION, SOLUTION INTRAVENOUS; SUBCUTANEOUS at 19:16

## 2018-03-28 RX ADMIN — MORPHINE SULFATE 5 MG: 4 INJECTION, SOLUTION INTRAMUSCULAR; INTRAVENOUS at 14:35

## 2018-03-28 RX ADMIN — NYSTATIN: 100000 CREAM TOPICAL at 17:12

## 2018-03-28 RX ADMIN — I.V. FAT EMULSION 250 ML: 20 EMULSION INTRAVENOUS at 19:07

## 2018-03-28 RX ADMIN — INSULIN LISPRO 3 UNITS: 100 INJECTION, SOLUTION INTRAVENOUS; SUBCUTANEOUS at 01:01

## 2018-03-28 RX ADMIN — INSULIN LISPRO 3 UNITS: 100 INJECTION, SOLUTION INTRAVENOUS; SUBCUTANEOUS at 14:36

## 2018-03-28 RX ADMIN — Medication: at 16:01

## 2018-03-28 RX ADMIN — Medication 10 ML: at 22:00

## 2018-03-28 NOTE — PROGRESS NOTES
Bedside and Verbal shift change report given to nichole (oncoming nurse) by robert (offgoing nurse). Report included the following information SBAR, Kardex, Intake/Output, Recent Results and Cardiac Rhythm sinus tach.

## 2018-03-28 NOTE — PROGRESS NOTES
Palliative Medicine Consult  [place]: [phone] ([phone])    Patient Name: Kristy Weeks  YOB: 1975    Date of Initial Consult: 3/12/18  Reason for Consult: Pain management, chronic and post op   Requesting Provider: Rajni   Primary Care Physician: Inocencio Coelho MD     SUMMARY:   Kristy Weeks is a 38 y.o. with a past history of Crohn's disease (followed by Dr David Jimenez), mult surgeries s/p total colectomy w/ end ileostomy, 6/2017 ex lap with small bowel perforation, 6/28/17 ex lap, LPA, repair or enterotomy, SMA stent on L, 7/16/2917 ex lap, KATHRINE, fistula exclusion w/ feeding tube placement and prolonged TPN who was admitted on 3/7/2018 from home for planned surgery, s/p ex lap w/ extensive KATHRINE and small bowel fistula take down, wound vac placement. 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 Known to our team during past hospitalizations. Has had lengthy hospital stays w/ mult complications. Has required [facility] stays in past. Has chronic pain from Crohn's disease (and has not been able to tolerate home Crohn's meds recently due to acute issues- had been on steroids, stopped prior to surgery). Discussion of Remicaide in future. Current medical issues leading to Palliative Medicine involvement include: pain management. Patient's mother, Romi Loo is NOK.  reviewed, no abberrancies- one prescriber for opioids and one pharmacy. Most recently filled 3/9/18: MSContin 100mg tid and Morphine IR 30mg- 8 tabs a day. Has been tried on equivalent dose of Fentanyl patch w/out same benefit. Discussed Methadone but would have to check w/ her prescriber. PALLIATIVE DIAGNOSES:   Acute post operative pain in abdomen(EXPLORATORY LAPAROTOMY, LYSIS OF ADHESION X 5HOURS, SMALL BOWEL FISTULA TAKE DOWN X 2 AND WOUND VAC PLACEMENT day 19th )   1. Chronic abdominal pain from Cronh's disease and hx of surgeries  2. Nausea  3. Anxiety   4.  Feeding difficulties due to medical condition- has been on TPN for several months  5. Edema   6. L ankle pain s/p fall, normal XRs- improving   7. Grief/depressive sx   8. Constipation   9. Opioid tolerance      PLAN:     Abd pain from surgical incision and wound care as well as underlying chronic pain from Crohn's which is worse when off her steroids. Has high opioid tolerance, no aberrancies on . We have done opioid rotation in the hospital for her in past.     Pain :      1. currently Dilaudid PCA 0.6 mg q 6 mins, no basal .    2. Increase ms contin to 115 mg q hs , due to uncontrol pain at night , continue with 100 mg of ms contin in morning and  afternoon ( home dose of MS contin is 100 mg tid)  4. Continue morphine 5mg IV q3h for severe pain; can use if SBP > 90; if SBP < 90, would recheck a few times, if persistently low would consider notifying primary service    5. Following to assist w/ transition to po and for emotional support as pt needs. PC has spoken w/ Dr Angeles Marcos (sees pt for her pain) and he is okay w/ continuing home regimen upon discharge if needed. Dr. Soco Sandoval explained to pt she does not recommend higher doses when she leaves. 6. I spoke to her mom Kyle Enriquez on (3/27 /18), She will check how many days of pain pills she has at home , her mom visit her in the evening after work, and she will leave a note of pill count at home with patient , encouraged,  mom to make an appointment with Dr Marya Hyatt , her mom is waiting what is plan of discrge time frame , in speaking to dr Gena Chávez, patient may be able to discharged in a week time . If she needs a few days Rx to bridge her, PC can do this for her as we  know her hx well. 7.Bowel regimen, anti-emetics per surgery, nausea with octreotide( nausea side effect  (5% to 61% )UPTODATE. 4. Nutrition : on chronic TPN    5.  Psychosocial/spritual : spirits are positive,  her mom is main support, her  is in close touch with her , see [address] notes.    Communicated plan of care with: Palliative IDT; bed side Lenny Doss. GOALS OF CARE / TREATMENT PREFERENCES:     GOALS OF CARE:  Patient/Health Care Proxy Stated Goals:  (treatment of acute medical issues and pain control)      TREATMENT PREFERENCES:   Code Status: Full Code    Advance Care Planning:  Advance Care Planning 3/15/2018   Patient's Healthcare Decision Maker is: Named in scanned ACP document   Primary Decision Maker Name eWndy Cordero   Primary Decision Maker Phone Number [phone]   Primary Decision Maker Relationship to Patient Parent   Secondary Decision Maker Name Callie Tee   Secondary Decision Maker Phone Number [phone]   Secondary Decision Maker Relationship to Patient Unknown   Confirm Advance Directive Yes, on file   Patient Would Like to Complete Advance Directive -   Does the patient have other document types -       Medical Interventions: Full interventions           Other:    As far as possible, the palliative care team has discussed with patient / health care proxy about goals of care / treatment preferences for patient. HISTORY:    patient says pain varies \" up and down \", currently 7/10, her base line pain is 5/10. Notes nausea with octreotide(nause side effect is 5-61%( UPTO DATE )    Blood pressure is at base schu838/51. I/v fluids discontinued because of fluid retention and edema. Reviewed vitals, I/O's, labs, imaging, MAR, notes  Tmax 100.3, , on room air, \"alert\"    Na and cr wnl, lft not elevated, albumin 1.1          MAR includes   Tylenol,  last dose is 2200 3/27/18  Albuterol prn, no recent dose  Asa 325mg  Benadryl, no recent dose  Lasix 20mg iv daily  Marinol  Dilaudid pca, 0 basal , 0.5mg every 6min pca dose  Morphine 5mg IV q3h PRN, two doses today. Ativan 1mg IV prn, 0 use in last 48 hours. Zofran, 4 doses in last 24 hours. Compazine, last dose 2200(3/26/18 )  Phenergan, no recent dose  Octreotide.    Scopolamine patch        HPI/SUBJECTIVE:    The patient is:   [x] Verbal and participatory  [] Non-participatory due to:     Pain severe this morning during my visit -- pca has run out, awaiting new cartridge. RN getting PRN morphine dose now. No nausea, no sob. 3/26/18: pain is up and down , 7/10 now, base line pain is 5/10, she reports nausea with octeotide, relieved with zofran.    3/27/18:  Pain is stable , 6/10, able to sleep, this morning pain is 8/10. Nausea with octreotide controlled with zofran . 3/28/18: currently pain is incresed after wound care , stabbing , pain flares up at night and with coughing and deep breathing . She is in good spirits, sat in recliner chair . Nausea only with octreotide. Clinical Pain Assessment (nonverbal scale for severity on nonverbal patients):   Clinical Pain Assessment  Severity: 7  Location: abdomen   Character: aching , sharp  Duration: chronic , and since surgery  Effect: movemnt can increase pain . Factors: incraese in pain with wound care.   Frequency: constant          Duration: for how long has pt been experiencing pain (e.g., 2 days, 1 month, years)  Frequency: how often pain is an issue (e.g., several times per day, once every few days, constant)     FUNCTIONAL ASSESSMENT:     Palliative Performance Scale (PPS):  PPS: 50       PSYCHOSOCIAL/SPIRITUAL SCREENING:     Palliative IDT has assessed this patient for cultural preferences / practices and a referral made as appropriate to needs (Cultural Services, Patient Advocacy, Ethics, etc.)    Advance Care Planning:  Advance Care Planning 3/15/2018   Patient's Healthcare Decision Maker is: Named in scanned ACP document   Primary Decision Maker Name Brigette Hernández   Primary Decision Maker Phone Number [phone]   Primary Decision Maker Relationship to Patient Parent   Secondary Decision Maker Name Tobydestiny Lamar   Secondary Decision Maker Phone Number [phone]   Secondary Decision Maker Relationship to Patient Unknown   Confirm Advance Directive Yes, on file   Patient Would Like to Complete Advance Directive -   Does the patient have other document types -       Any spiritual / Bahai concerns:  [] Yes /  [x] No    Caregiver Burnout:  [] Yes /  [x] No /  [] No Caregiver Present      Anticipatory grief assessment:   [x] Normal  / [] Maladaptive       ESAS Anxiety: Anxiety: 0    ESAS Depression: Depression: 0        REVIEW OF SYSTEMS:     Positive and pertinent negative findings in ROS are noted above in HPI. The following systems were [x] reviewed / [] unable to be reviewed as noted in HPI  Other findings are noted below. Systems: constitutional, ears/nose/mouth/throat, respiratory, gastrointestinal, genitourinary, musculoskeletal, integumentary, neurologic, psychiatric, endocrine. Positive findings noted below. Modified ESAS Completed by: provider   Fatigue: 5 Drowsiness: 0   Depression: 0 Pain: 7   Anxiety: 0 Nausea: 2 (only after taking octreotide.)   Anorexia: 3 Dyspnea: 0     Constipation: No              PHYSICAL EXAM:     From RN flowsheet:  Wt Readings from Last 3 Encounters:   03/28/18 (!) 357 lb 5.9 oz (162.1 kg)   03/05/18 304 lb 3.2 oz (138 kg)   02/28/18 306 lb (138.8 kg)     Blood pressure 105/75, pulse (!) 123, temperature 98.3 °F (36.8 °C), resp. rate 14, height 5' 4\" (1.626 m), weight (!) 357 lb 5.9 oz (162.1 kg), SpO2 99 %. Pain Scale 1: Numeric (0 - 10)  Pain Intensity 1: 7  Pain Onset 1: constant  Pain Location 1: Abdomen  Pain Orientation 1: Medial, Left  Pain Description 1: Aching  Pain Intervention(s) 1: Medication (see MAR)  Last bowel movement, if known: stool output in ostomy     Constitutional: awake, alert, oriented, engages in conversation , obese built.   Eyes: pupils equal, anicteric  ENMT: moist mucous membranes  Cardiovascular: regular rhythm  Respiratory: breathing not labored, clear anteriorly   Gastrointestinal: soft , hypoactive bowel sounds, colostomy bag in place, big midline incision , with dressing . Musculoskeletal: no deformity  Skin: warm, dry, pale  Ext: B/l pitting LE edema  Neurologic: following commands, moving all extremities  Psychiatric: full affect, no hallucinations         HISTORY:     Active Problems:    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)      Past Medical History:   Diagnosis Date    Adverse effect of anesthesia     WOKE DURING SURGERY    Arthritis     Blood clot in vein 2017    STOMACH    Crohn's disease ([address] 8/15/2011    DVT (deep venous thrombosis) ([address] 8/15/2011    LEFT LEG    Edema     GENERALIZED R/T TPN; WAIST DOWN    Incarcerated ventral hernia 8/15/2011    Lupus     Lyme disease     Psychiatric disorder     ANXIETY    Right flank pain 8/22/2011    Seiz[address] [facility] [address] 1990    LAST AT AGE 17    Stroke [facility]) 1990    age 17; A WEEK POST OP, HAD SEIZURES AND STROKE, WAS IN COMA FOR A MONTH    Thyroid disease     HYPO-NO MEDS      Past Surgical History:   Procedure Laterality Date    HX APPENDECTOMY      HX GYN  2015    HIDRADENTIS LABIA    HX OTHER SURGICAL      multiple procedures related to her Crohn's disease    HX OTHER SURGICAL      ABDOMINAL SURGERY REMOVING COLON, 2/3 STOMACH, 1/3 SMALL INTESTINE    SC LAP, INCISIONAL HERNIA REPAIR,INCARCERATED  9-10-08    dr. Ysabel Livingston      Family History   Problem Relation Age of Onset    Hypertension Father     Stroke Father     Other Father      arthritis    Arthritis-osteo Father     Hypertension Mother     Arthritis-osteo Mother     Anesth Problems Neg Hx       History reviewed, no pertinent family history.   Social History   Substance Use Topics    Smoking status: Former Smoker     Packs/day: 1.00     Years: 16.00     Quit date: 2/27/2017    Smokeless tobacco: Former User      Comment: OFF AND ON    Alcohol use No     Allergies   Allergen Reactions    Sulfa (Sulfonamide Antibiotics) Anaphylaxis    Demerol [Meperidine] Rash    Soma [Carisoprodol] Rash and Nausea Only    Toradol [Ketorolac Tromethamine] Nausea and Vomiting      Current Facility-Administered Medications   Medication Dose Route Frequency    TPN ADULT - CENTRAL   IntraVENous CONTINUOUS    TPN ADULT - CENTRAL   IntraVENous CONTINUOUS    morphine injection 5 mg  5 mg IntraVENous Q3H PRN    morphine CR (MS CONTIN) tablet 100 mg  100 mg Oral TID    aspirin (ASPIRIN) tablet 325 mg  325 mg Oral DAILY    HYDROmorphone (PF) 25 mg/50 mL (DILAUDID) PCA   IntraVENous CONTINUOUS    0.9% sodium chloride infusion 250 mL  250 mL IntraVENous PRN    acetaminophen (TYLENOL) tablet 650 mg  650 mg Oral Q6H PRN    diphenhydrAMINE (BENADRYL) capsule 25 mg  25 mg Oral Q6H PRN    enoxaparin (LOVENOX) injection 40 mg  40 mg SubCUTAneous Q24H    octreotide (SANDOSTATIN) injection 50 mcg  50 mcg IntraVENous TID    fat emulsion 20% (LIPOSYN, INTRAlipid) infusion 250 mL  250 mL IntraVENous Q MON, WED & FRI    0.9% sodium chloride infusion 250 mL  250 mL IntraVENous PRN    0.9% sodium chloride infusion 250 mL  250 mL IntraVENous PRN    nystatin (MYCOSTATIN) 100,000 unit/gram cream   Topical BID    prochlorperazine (COMPAZINE) with saline injection 5 mg  5 mg IntraVENous Q6H PRN    naloxone (NARCAN) injection 0.4 mg  0.4 mg IntraVENous EVERY 2 MINUTES AS NEEDED    0.9% sodium chloride infusion 250 mL  250 mL IntraVENous PRN    glucose chewable tablet 16 g  4 Tab Oral PRN    dextrose (D50W) injection syrg 12.5-25 g  12.5-25 g IntraVENous PRN    glucagon (GLUCAGEN) injection 1 mg  1 mg IntraMUSCular PRN    insulin lispro (HUMALOG) injection   SubCUTAneous Q6H    scopolamine (TRANSDERM-SCOP) 1 mg over 3 days 1 Patch  1 Patch TransDERmal Q72H    sodium chloride (NS) flush 20 mL  20 mL InterCATHeter PRN    sodium chloride (NS) flush 10 mL  10 mL InterCATHeter Q24H    sodium chloride (NS) flush 10 mL  10 mL InterCATHeter PRN    sodium chloride (NS) flush 10 mL  10 mL InterCATHeter Q8H    alteplase (CATHFLO) 1 mg in sterile water (preservative free) 1 mL injection  1 mg InterCATHeter PRN    bacitracin 500 unit/gram packet 1 Packet  1 Packet Topical PRN    sodium chloride (NS) flush 20 mL  20 mL InterCATHeter PRN    sodium chloride (NS) flush 10 mL  10 mL InterCATHeter Q24H    sodium chloride (NS) flush 10 mL  10 mL InterCATHeter PRN    sodium chloride (NS) flush 10 mL  10 mL InterCATHeter Q8H    dronabinol (MARINOL) capsule 2.5 mg  2.5 mg Oral BID    phenol throat spray (CHLORASEPTIC) 1 Spray  1 Spray Oral PRN    ondansetron (ZOFRAN) injection 4 mg  4 mg IntraVENous Q4H PRN    promethazine (PHENERGAN) 12.5 mg in 0.9% sodium chloride 50 mL IVPB  12.5 mg IntraVENous Q6H PRN    LORazepam (ATIVAN) injection 1 mg  1 mg IntraVENous Q6H PRN    albuterol (PROVENTIL VENTOLIN) nebulizer solution 2.5 mg  2.5 mg Nebulization Q4H PRN          LAB AND IMAGING FINDINGS:     Lab Results   Component Value Date/Time    WBC 10.3 03/27/2018 06:13 AM    HGB 7.5 (L) 03/27/2018 06:13 AM    PLATELET 160 61/88/0371 06:13 AM     Lab Results   Component Value Date/Time    Sodium 139 03/26/2018 04:06 AM    Potassium 3.8 03/26/2018 04:06 AM    Chloride 105 03/26/2018 04:06 AM    CO2 27 03/26/2018 04:06 AM    BUN 14 03/26/2018 04:06 AM    Creatinine 0.55 03/26/2018 04:06 AM    Calcium 8.2 (L) 03/26/2018 04:06 AM    Magnesium 1.9 03/26/2018 04:06 AM    Phosphorus 4.7 03/23/2018 05:36 AM      Lab Results   Component Value Date/Time    AST (SGOT) 13 (L) 03/24/2018 04:08 AM    Alk.  phosphatase 94 03/24/2018 04:08 AM    Protein, total 6.2 (L) 03/24/2018 04:08 AM    Albumin 1.1 (L) 03/24/2018 04:08 AM    Globulin 5.1 (H) 03/24/2018 04:08 AM     Lab Results   Component Value Date/Time    INR 1.0 12/07/2017 08:37 AM    Prothrombin time 10.0 12/07/2017 08:37 AM      No results found for: IRON, FE, TIBC, IBCT, PSAT, FERR   No results found for: PH, PCO2, PO2  No components found for: GLPOC   No results found for: CPK, CKMB             Total time: 35 mins  Counseling / coordination time, spent as noted above: 20 mins  > 50% counseling / coordination?:     Prolonged service was provided for  []30 min   []75 min in face to face time in the presence of the patient, spent as noted above. Time Start:   Time End:   Note: this can only be billed with 35422 (initial) or 43396 (follow up). If multiple start / stop times, list each separately.

## 2018-03-28 NOTE — PROGRESS NOTES
NUTRITION COMPLETE ASSESSMENT    RECOMMENDATIONS:   1. Agree with trial of increasing TPN volume to 2400 mL/day   -- Adjust AA concentration to 5% with increase in volume  -- Continue to monitor BG closely and, if uncontrolled, could decrease Dextrose in TPN to 15%    2. Continue daily weights. Need to obtain via standing scale weight as able to most accurately estimate nutrition needs and make TPN adjustments     Interventions/Plan:   Food/Nutrient Delivery: TPN as sole source of nutrition    Assessment:   Reason for Assessment:   [x]Reassessment     Diet:  NPO  Nutritionally Significant Medications: [x] Reviewed & Includes: marinol twice/day; octreotide 3x/day; compazine q6 hour sprn; phenergen q6 hours prn; scopolamine patch q 72 hours    Future TPN: 5%AA D20 @ 100 mL/hr + MVI, thiamine (100 mg), famotidine 40 mg, trace elements 3x/week, zinc sulfate 10 mg 3x/week + 20% lipids, 250 mL 3x/week    Subjective:  Saw pt earlier today up in the chair! She admitted that the Per RN, since that time, she was put back in the bed after feeling like she was sliding out of the chair. No weight obtained this time. RN to pass in report that standing weight is needed. Objective:  Chart reviewed, discussed with RN and team during interdisciplinary rounds. Also spoke with MD regarding TPN adjustments. Pt remains on TPN as sole source of nutrition. Per MD, pt continues to have episodes of hypotension. Discussed increasing TPN volume and MD to trial.  Of note, she was on 2400 mL during last admission and volume was decreased PTA by [facility] secondary to pt's oral intake. Noted weight has actually increased after bedscale was rezeroed yesterday (weight this morning is 162.1 kg). This still seems like a rapid increase, even over a 3 week period (she was 137 kg on admission). Need to obtain standing scale weight to more accurately estimate nutrition needs and further TPN adjustments.     TPN has been providing a daily average of 2049 kcal, 120 g protein in 1992 mL (2242 mL on lipid days)-- meeting 100% of estimated needs. 398 gm CHO. Above increase would provide a daily average of 2326 kcal, 120 g protein in 2400 mL (2650 mL on lipid days)-- meeting 100% of estimated needs. 480 gm CHO. Pt required 9 units of correction scale. If BG control is challenging with increase in volume, could decrease dextrose concentration to 15%. This would decrease average daily kcal to 1919 kcal/day. Estimated Nutrition Needs:   Kcals/day: 1507 Kcals/day (3571-9314 kcal/day (11-15 kcal/kg))  Protein: 110 g (0.8 g/kg)  Fluid: 2000 ml (or per output)     Based On: Kcal/kg - specify (Comment)  Weight Used: Actual wt (137 kg (need updated wt))    Pt expected to meet estimated nutrient needs:  []   Yes     []  No  [x] Unable to predict at this time (weight trends are difficult to assess-- pt remains on TPN for nutrition support and this will be ongoing. Nutrition Diagnosis:   1. 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 Less than optimal parenteral nutrition related to low volume/concentration TPN as evidenced by resolved.     Goals:     Pt to meet at least 90% of estimated needs via TPN over the next 5-7 days     Monitoring & Evaluation:    - Enteral/parenteral nutrition intake   - Weight/weight change     Previous Nutrition Goals Met:  Yes  Previous Recommendations:      Yes    Education & Discharge Needs:   [] None Identified   [x] Identified and addressed    [x] Participated in care plan, discharge planning, and/or interdisciplinary rounds        Cultural, Latter-day and ethnic food preferences identified:  NONE      Skin Integrity: []Intact  [x]Other: incision-- see wound care documentation (may have wound vac placed)  Edema: []None [x]Other: 1-3+ pitting  Last BM: 3/27/18  Food Allergies: [x]None []Other    Anthropometrics:    Weight Loss Metrics 3/28/2018 3/7/2018 3/5/2018 2/28/2018 2/27/2018 2/27/2018 2/26/2018   Today's Wt 357 lb 5.9 oz - 304 lb 3.2 oz 306 lb 306 lb 8 oz - 307 lb   BMI - 61.34 kg/m2 52.22 kg/m2 52.52 kg/m2 52.61 kg/m2 - 52.7 kg/m2      Last 3 Recorded Weights in this Encounter    03/27/18 0638 03/27/18 1549 03/28/18 0345   Weight: 155 kg (341 lb 11.4 oz) (!) 162.6 kg (358 lb 7.5 oz) (!) 162.1 kg (357 lb 5.9 oz)      Weight Source: Bed  Height: 5' 4\" (162.6 cm),    Body mass index is 61.34 kg/(m^2). IBW : 54.4 kg (120 lb),    Usual Body Weight: 135.2 kg (298 lb) (1/2018),      Labs:    Lab Results   Component Value Date/Time    Sodium 139 03/26/2018 04:06 AM    Potassium 3.8 03/26/2018 04:06 AM    Chloride 105 03/26/2018 04:06 AM    CO2 27 03/26/2018 04:06 AM    Glucose 126 (H) 03/26/2018 04:06 AM    BUN 14 03/26/2018 04:06 AM    Creatinine 0.55 03/26/2018 04:06 AM    Calcium 8.2 (L) 03/26/2018 04:06 AM    Magnesium 1.9 03/26/2018 04:06 AM    Phosphorus 4.7 03/23/2018 05:36 AM    Albumin 1.1 (L) 03/24/2018 04:08 AM     No results found for: HBA1C, HGBE8, LUE3KMUE, IEY3FFFA  Lab Results   Component Value Date/Time    Glucose 126 (H) 03/26/2018 04:06 AM    Glucose (POC) 172 (H) 03/28/2018 12:04 PM      Lab Results   Component Value Date/Time    ALT (SGPT) 10 (L) 03/24/2018 04:08 AM    AST (SGOT) 13 (L) 03/24/2018 04:08 AM    Alk.  phosphatase 94 03/24/2018 04:08 AM    Bilirubin, direct 0.1 07/07/2009 06:38 PM    Bilirubin, total 0.4 03/24/2018 04:08 AM      [address]

## 2018-03-28 NOTE — PROGRESS NOTES
Problem: Mobility Impaired (Adult and Pediatric)  Goal: *Acute Goals and Plan of Care (Insert Text)  Physical Therapy Goals  Revised 3/27/2018  1. Patient will move from supine to sit and sit to supine  in bed with moderate assistance  within 7 day(s). 2.  Patient will transfer from bed to chair and chair to bed with moderate assistance  using the least restrictive device within 7 day(s). 3.  Patient will perform sit to stand with moderate assistance  within 7 day(s). 4.  Patient will ambulate 100' with moderate assist x1 using the least restrictive device within 7 day(s). Revisited 3/19/2018, goals remain appropriate, carry over    Physical Therapy Goals  Initiated 3/8/2018  1. Patient will move from supine to sit and sit to supine  in bed with moderate assistance  within 7 day(s). 2.  Patient will transfer from bed to chair and chair to bed with moderate assistance  using the least restrictive device within 7 day(s). 3.  Patient will perform sit to stand with moderate assistance  within 7 day(s). 4.  PT will assess gait with the least restrictive device within 7 day(s). physical Therapy TREATMENT  Patient: Deion Griggs (38 y.o. 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 ASSESSMENT:  Chart reviewed, RN cleared patient for mobility, and patient received in bed. Using bed mechanics patient transitioned to full chair and stood Mod A x 2, then sidestepped to stretcher chair with Mod A x 2 with maximal encouragement. Initially patient demos poor trunk control/altered COG in standing but with each step trunk control improved.  Patient ended session in chair and was educated/performed LE ther ex and sequencing of tranfers. Patient continues to be limited by anxiety/fear but is motivated to progress. PT recommending patient utilize RW for all transfers/OOB mobility in order to foster independence and eventually progress to becoming modified independent vs requiring physical assist from caregivers. Progression toward goals:  []    Improving appropriately and progressing toward goals  [x]    Improving slowly and progressing toward goals  []    Not making progress toward goals and plan of care will be adjusted     PLAN:  Patient continues to benefit from skilled intervention to address the above impairments. Continue treatment per established plan of care. Discharge Recommendations:  Rehab vs Home Health  Further Equipment Recommendations for Discharge:  Bariatric walker? SUBJECTIVE:   Patient stated I just get so scared. Like at home I would wait for my mom to get home because she was my security blanket.     OBJECTIVE DATA SUMMARY:   Critical Behavior:  Neurologic State: Alert  Orientation Level: Oriented X4  Cognition: Appropriate decision making     Functional Mobility Training:  Bed Mobility:     Supine to Sit:  (utilized bed mechanics)              Transfers:  Sit to Stand: Assist x2; Moderate assistance;Minimum assistance  Stand to Sit: Assist x2; Moderate assistance;Minimum assistance        Bed to Chair: Assist x2 (max encouragement, Mod A x 2)                    Balance:     Ambulation/Gait Training:  Distance (ft): 6 Feet (ft)  Assistive Device: Gait belt;Walker, rolling  Ambulation - Level of Assistance: Moderate assistance        Gait Abnormalities: Decreased step clearance;Trunk sway increased        Base of Support: Widened     Speed/Liz: Shuffled; Slow (sidestepping)                       Stairs:              Neuro Re-Education:    Therapeutic Exercises:   Reviewed LE ther ex - ankle pumps, hip flexion, LAQ  Added bridging in bed, heel slides, and glute squeezes in sitting    Pain:  Pain Scale 1: Numeric (0 - 10)  Pain Intensity 1: 7  Pain Location 1: Abdomen  Pain Orientation 1: Medial;Left  Pain Description 1: Aching  Pain Intervention(s) 1: Medication (see MAR)  Activity Tolerance:   Fair, HR/RR significantly increased d/t anxiety/deconditioning, patient motivated to progress/improve  Please refer to the flowsheet for vital signs taken during this treatment.   After treatment:   [x]    Patient left in no apparent distress sitting up in chair  []    Patient left in no apparent distress in bed  [x]    Call bell left within reach  [x]    Nursing notified  []    Caregiver present  []    Bed alarm activated    COMMUNICATION/COLLABORATION:   The patients plan of care was discussed with: Registered Nurse    Cristóbal Joseph PT, DPT   Time Calculation: 27 mins

## 2018-03-28 NOTE — PROGRESS NOTES
Progress Note    Patient: Ibeth Walsh MRN: [id number]  SSN: [id number]    YOB: 1975  Age: 38 y.o. Sex: female      Admit Date: 3/7/2018    21 Days Post-Op    Procedure:  Procedure(s):  EXPLORATORY LAPAROTOMY, LYSIS OF ADHESION X 5HOURS, SMALL BOWEL FISTULA TAKE DOWN X 2 AND WOUND VAC PLACEMENT    Subjective:     No acute surgical issues. Pt was able to get out of bed yesterday. Still with pain issues. Reported difficulty sleeping last night. Minimal output from ileostomy.        Objective:     Visit Vitals    BP (!) 81/43 (BP 1 Location: Left arm, BP Patient Position: At rest)    Pulse 100    Temp 97.9 °F (36.6 °C)    Resp 19    Ht 5' 4\" (1.626 m)    Wt (!) 357 lb 5.9 oz (162.1 kg)    SpO2 100%    BMI 61.34 kg/m2       Temp (24hrs), Av.5 °F (36.9 °C), Min:97.9 °F (36.6 °C), Max:98.9 °F (37.2 °C)        Physical Exam:    Gen:  NAD  Pulm:  Unlabored  Abd:  S/ND/appropriate TTP  Midline wound with bilious output  Ostomy:  Pink, patent and productive     Recent Results (from the past 24 hour(s))   GLUCOSE, POC    Collection Time: 18 11:00 AM   Result Value Ref Range    Glucose (POC) 154 (H) 65 - 100 mg/dL    Performed by Anisa El    GLUCOSE, POC    Collection Time: 18  5:32 PM   Result Value Ref Range    Glucose (POC) 148 (H) 65 - 100 mg/dL    Performed by Michelle Serrano    GLUCOSE, POC    Collection Time: 18 12:14 AM   Result Value Ref Range    Glucose (POC) 151 (H) 65 - 100 mg/dL    Performed by Olena Chen, POC    Collection Time: 18  6:14 AM   Result Value Ref Range    Glucose (POC) 129 (H) 65 - 100 mg/dL    Performed by Scarlet Nguyen          Assessment:     Hospital Problems  Date Reviewed: 10/27/2017          Codes Class Noted POA    Small bowel fistula ICD-10-CM: K63.2  ICD-9-CM: 569.81  3/7/2018 Unknown              Plan/Recommendations/Medical Decision Making:     - Local wound care to midline abdomen  - Pain control  - NPO with sips  - Continue octreotide  - Renew TPN with increased volume  - Out of bed as tolerated  - Labs in am      Signed By: Josef Bolden MD     March 28, 2018

## 2018-03-29 LAB
ALBUMIN SERPL-MCNC: 1.2 G/DL (ref 3.5–5)
ALBUMIN/GLOB SERPL: 0.2 {RATIO} (ref 1.1–2.2)
ALP SERPL-CCNC: 95 U/L (ref 45–117)
ALT SERPL-CCNC: 8 U/L (ref 12–78)
ANION GAP SERPL CALC-SCNC: 6 MMOL/L (ref 5–15)
AST SERPL-CCNC: 10 U/L (ref 15–37)
BILIRUB SERPL-MCNC: 0.3 MG/DL (ref 0.2–1)
BUN SERPL-MCNC: 14 MG/DL (ref 6–20)
BUN/CREAT SERPL: 24 (ref 12–20)
CALCIUM SERPL-MCNC: 8.3 MG/DL (ref 8.5–10.1)
CHLORIDE SERPL-SCNC: 109 MMOL/L (ref 97–108)
CO2 SERPL-SCNC: 24 MMOL/L (ref 21–32)
CREAT SERPL-MCNC: 0.58 MG/DL (ref 0.55–1.02)
ERYTHROCYTE [DISTWIDTH] IN BLOOD BY AUTOMATED COUNT: 18 % (ref 11.5–14.5)
GLOBULIN SER CALC-MCNC: 5.5 G/DL (ref 2–4)
GLUCOSE BLD STRIP.AUTO-MCNC: 146 MG/DL (ref 65–100)
GLUCOSE BLD STRIP.AUTO-MCNC: 150 MG/DL (ref 65–100)
GLUCOSE BLD STRIP.AUTO-MCNC: 152 MG/DL (ref 65–100)
GLUCOSE BLD STRIP.AUTO-MCNC: 159 MG/DL (ref 65–100)
GLUCOSE SERPL-MCNC: 150 MG/DL (ref 65–100)
HCT VFR BLD AUTO: 22.6 % (ref 35–47)
HGB BLD-MCNC: 7 G/DL (ref 11.5–16)
MAGNESIUM SERPL-MCNC: 1.7 MG/DL (ref 1.6–2.4)
MCH RBC QN AUTO: 30.3 PG (ref 26–34)
MCHC RBC AUTO-ENTMCNC: 31 G/DL (ref 30–36.5)
MCV RBC AUTO: 97.8 FL (ref 80–99)
NRBC # BLD: 0.04 K/UL (ref 0–0.01)
NRBC BLD-RTO: 0.4 PER 100 WBC
PLATELET # BLD AUTO: 364 K/UL (ref 150–400)
PMV BLD AUTO: 10 FL (ref 8.9–12.9)
POTASSIUM SERPL-SCNC: 4.4 MMOL/L (ref 3.5–5.1)
PROT SERPL-MCNC: 6.7 G/DL (ref 6.4–8.2)
RBC # BLD AUTO: 2.31 M/UL (ref 3.8–5.2)
SERVICE CMNT-IMP: ABNORMAL
SODIUM SERPL-SCNC: 139 MMOL/L (ref 136–145)
WBC # BLD AUTO: 10 K/UL (ref 3.6–11)

## 2018-03-29 PROCEDURE — 82962 GLUCOSE BLOOD TEST: CPT

## 2018-03-29 PROCEDURE — 74011636637 HC RX REV CODE- 636/637: Performed by: SURGERY

## 2018-03-29 PROCEDURE — 74011250636 HC RX REV CODE- 250/636: Performed by: SURGERY

## 2018-03-29 PROCEDURE — 97530 THERAPEUTIC ACTIVITIES: CPT

## 2018-03-29 PROCEDURE — 3331090002 HH PPS REVENUE DEBIT

## 2018-03-29 PROCEDURE — 74011000250 HC RX REV CODE- 250: Performed by: PHYSICIAN ASSISTANT

## 2018-03-29 PROCEDURE — P9045 ALBUMIN (HUMAN), 5%, 250 ML: HCPCS | Performed by: SURGERY

## 2018-03-29 PROCEDURE — 74011250637 HC RX REV CODE- 250/637: Performed by: NURSE PRACTITIONER

## 2018-03-29 PROCEDURE — 3331090001 HH PPS REVENUE CREDIT

## 2018-03-29 PROCEDURE — 74011000258 HC RX REV CODE- 258: Performed by: PHYSICIAN ASSISTANT

## 2018-03-29 PROCEDURE — 74011636637 HC RX REV CODE- 636/637: Performed by: PHYSICIAN ASSISTANT

## 2018-03-29 PROCEDURE — 80053 COMPREHEN METABOLIC PANEL: CPT | Performed by: SURGERY

## 2018-03-29 PROCEDURE — 65660000000 HC RM CCU STEPDOWN

## 2018-03-29 PROCEDURE — 74011250637 HC RX REV CODE- 250/637: Performed by: SURGERY

## 2018-03-29 PROCEDURE — 74011250637 HC RX REV CODE- 250/637: Performed by: PHYSICIAN ASSISTANT

## 2018-03-29 PROCEDURE — 74011250637 HC RX REV CODE- 250/637: Performed by: INTERNAL MEDICINE

## 2018-03-29 PROCEDURE — 83735 ASSAY OF MAGNESIUM: CPT | Performed by: SURGERY

## 2018-03-29 PROCEDURE — 74011250636 HC RX REV CODE- 250/636: Performed by: INTERNAL MEDICINE

## 2018-03-29 PROCEDURE — 85027 COMPLETE CBC AUTOMATED: CPT | Performed by: SURGERY

## 2018-03-29 PROCEDURE — 97116 GAIT TRAINING THERAPY: CPT

## 2018-03-29 PROCEDURE — 74011250636 HC RX REV CODE- 250/636: Performed by: PHYSICIAN ASSISTANT

## 2018-03-29 PROCEDURE — 74011250636 HC RX REV CODE- 250/636: Performed by: NURSE PRACTITIONER

## 2018-03-29 PROCEDURE — 36415 COLL VENOUS BLD VENIPUNCTURE: CPT | Performed by: SURGERY

## 2018-03-29 RX ORDER — ALBUMIN HUMAN 50 G/1000ML
25 SOLUTION INTRAVENOUS EVERY 6 HOURS
Status: DISCONTINUED | OUTPATIENT
Start: 2018-03-29 | End: 2018-04-01

## 2018-03-29 RX ORDER — MORPHINE SULFATE 10 MG/ML
5 INJECTION, SOLUTION INTRAMUSCULAR; INTRAVENOUS DAILY
Status: DISCONTINUED | OUTPATIENT
Start: 2018-03-30 | End: 2018-04-03 | Stop reason: SDUPTHER

## 2018-03-29 RX ADMIN — MORPHINE SULFATE 5 MG: 4 INJECTION, SOLUTION INTRAMUSCULAR; INTRAVENOUS at 00:26

## 2018-03-29 RX ADMIN — ONDANSETRON HYDROCHLORIDE 4 MG: 2 INJECTION INTRAMUSCULAR; INTRAVENOUS at 22:55

## 2018-03-29 RX ADMIN — MORPHINE SULFATE 100 MG: 100 TABLET, FILM COATED, EXTENDED RELEASE ORAL at 15:50

## 2018-03-29 RX ADMIN — Medication 10 ML: at 21:50

## 2018-03-29 RX ADMIN — ONDANSETRON HYDROCHLORIDE 4 MG: 2 INJECTION INTRAMUSCULAR; INTRAVENOUS at 09:04

## 2018-03-29 RX ADMIN — MORPHINE SULFATE 5 MG: 4 INJECTION, SOLUTION INTRAMUSCULAR; INTRAVENOUS at 14:56

## 2018-03-29 RX ADMIN — MORPHINE SULFATE 100 MG: 100 TABLET, FILM COATED, EXTENDED RELEASE ORAL at 09:03

## 2018-03-29 RX ADMIN — DRONABINOL 2.5 MG: 2.5 CAPSULE ORAL at 09:03

## 2018-03-29 RX ADMIN — Medication 10 ML: at 05:10

## 2018-03-29 RX ADMIN — OCTREOTIDE ACETATE 50 MCG: 50 INJECTION, SOLUTION INTRAVENOUS; SUBCUTANEOUS at 00:17

## 2018-03-29 RX ADMIN — Medication 10 ML: at 22:56

## 2018-03-29 RX ADMIN — MORPHINE SULFATE 5 MG: 4 INJECTION, SOLUTION INTRAMUSCULAR; INTRAVENOUS at 10:51

## 2018-03-29 RX ADMIN — Medication 10 ML: at 19:37

## 2018-03-29 RX ADMIN — INSULIN LISPRO 3 UNITS: 100 INJECTION, SOLUTION INTRAVENOUS; SUBCUTANEOUS at 17:35

## 2018-03-29 RX ADMIN — ASCORBIC ACID: 500 INJECTION, SOLUTION INTRAMUSCULAR; INTRAVENOUS; SUBCUTANEOUS at 19:42

## 2018-03-29 RX ADMIN — ALBUMIN (HUMAN) 25 G: 12.5 INJECTION, SOLUTION INTRAVENOUS at 23:50

## 2018-03-29 RX ADMIN — Medication: at 17:41

## 2018-03-29 RX ADMIN — MORPHINE SULFATE 5 MG: 4 INJECTION, SOLUTION INTRAMUSCULAR; INTRAVENOUS at 19:33

## 2018-03-29 RX ADMIN — Medication 10 ML: at 00:18

## 2018-03-29 RX ADMIN — ACETAMINOPHEN 650 MG: 325 TABLET, FILM COATED ORAL at 19:34

## 2018-03-29 RX ADMIN — Medication: at 04:29

## 2018-03-29 RX ADMIN — Medication 10 ML: at 13:09

## 2018-03-29 RX ADMIN — ONDANSETRON HYDROCHLORIDE 4 MG: 2 INJECTION INTRAMUSCULAR; INTRAVENOUS at 00:17

## 2018-03-29 RX ADMIN — Medication 10 ML: at 17:36

## 2018-03-29 RX ADMIN — ONDANSETRON HYDROCHLORIDE 4 MG: 2 INJECTION INTRAMUSCULAR; INTRAVENOUS at 15:52

## 2018-03-29 RX ADMIN — ENOXAPARIN SODIUM 40 MG: 100 INJECTION SUBCUTANEOUS at 10:51

## 2018-03-29 RX ADMIN — DRONABINOL 2.5 MG: 2.5 CAPSULE ORAL at 17:35

## 2018-03-29 RX ADMIN — OCTREOTIDE ACETATE 50 MCG: 50 INJECTION, SOLUTION INTRAVENOUS; SUBCUTANEOUS at 15:52

## 2018-03-29 RX ADMIN — INSULIN LISPRO 3 UNITS: 100 INJECTION, SOLUTION INTRAVENOUS; SUBCUTANEOUS at 07:06

## 2018-03-29 RX ADMIN — MORPHINE SULFATE 115 MG: 15 TABLET, EXTENDED RELEASE ORAL at 23:50

## 2018-03-29 RX ADMIN — Medication 10 ML: at 15:53

## 2018-03-29 RX ADMIN — OCTREOTIDE ACETATE 50 MCG: 50 INJECTION, SOLUTION INTRAVENOUS; SUBCUTANEOUS at 09:04

## 2018-03-29 RX ADMIN — OCTREOTIDE ACETATE 50 MCG: 50 INJECTION, SOLUTION INTRAVENOUS; SUBCUTANEOUS at 22:55

## 2018-03-29 RX ADMIN — ALBUMIN (HUMAN) 25 G: 12.5 INJECTION, SOLUTION INTRAVENOUS at 17:38

## 2018-03-29 RX ADMIN — INSULIN LISPRO 3 UNITS: 100 INJECTION, SOLUTION INTRAVENOUS; SUBCUTANEOUS at 13:08

## 2018-03-29 RX ADMIN — ASPIRIN 325 MG: 325 TABLET ORAL at 09:03

## 2018-03-29 NOTE — PROGRESS NOTES
CM discussed patient with Dr Patrica Feliciano, he said patient will be discharged home with TPN as she has been on it for the last 9 months. She also said patient did not want to go to  rehab but preferred to return home with the Home Care services already in place. Patient also has a Midline abdominal surgical wound = 23 x 15 x 5 cm.+ that requires daily wound care. The plan is to reinsert the wound vac at a later date to aid healing. She remains on PCA for pain and gradually weaning to po narcotics. Patient is beginning to get out of bed with PT and doing much better. She is also tolerating sips and ice chips. Patient was opened to [facility] prior to admission. Per previous CM, mother preferred to have both agencies to resume care upon discharge. CM will continue to follow for discharge needs.  Garrett Hayes MSA, RN, CRM

## 2018-03-29 NOTE — ROUTINE PROCESS
Bedside shift change report given to MAURI Chacko (oncoming nurse) by Maurisio Miner RN (offgoing nurse). Report included the following information SBAR, OR Summary, Procedure Summary, Intake/Output, Recent Results, Med Rec Status and Cardiac Rhythm sinus tachycardia.

## 2018-03-29 NOTE — PROGRESS NOTES
General Surgery Daily Progress Note    Admit Date: 3/7/2018  Post-Operative Day: 22 Days Post-Op from Procedure(s):  EXPLORATORY LAPAROTOMY, LYSIS OF ADHESION X 5HOURS, SMALL BOWEL FISTULA TAKE DOWN X 2 AND WOUND VAC PLACEMENT     Subjective:     Last 24 hrs: Present for wound care. Plan was to apply wound vac today, but fistula not stomatized enough yet. Persistent abdominal pain adequately managed with PCA. Tolerating sips & ice chips. Denies fevers. Objective:     Blood pressure 95/45, pulse 100, temperature 98.6 °F (37 °C), resp. rate 16, height 5' 4\" (1.626 m), weight (!) 359 lb 5.6 oz (163 kg), SpO2 100 %. Temp (24hrs), Av.8 °F (37.1 °C), Min:98.3 °F (36.8 °C), Max:99.4 °F (37.4 °C)      _____________________  Physical Exam:     Alert and Oriented, talkative, in no acute distress. Appears happier today. Cardiovascular: RRR  Lungs: No resp distress or audible wheezes  Abdomen: large open abdominal wound with increased amount healthy, pink granulation tissue along boarders. Granulation tissue noted across exposed bowel. Bubbling enteric material coming from midline fistula. 2 red rubber drains placed near areas of drainage. 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)          Plan:     Conitnue wound care & dressing changes as noted. TPN  Encourage OOB as tolerated. PT ordered daily. DVT proph  Further treatment per Dr. Tosin Bejarano. Data Review:    Recent Labs      18   0429  18   0613   WBC  10.0  10.3   HGB  7.0*  7.5*   HCT  22.6*  23.6*   PLT  364  325     Recent Labs      18   0429   NA  139   K  4.4   CL  109*   CO2  24   GLU  150*   BUN  14   CREA  0.58   CA  8.3*   MG  1.7   ALB  1.2*   SGOT  10*   ALT  8*     No results for input(s): AML, LPSE in the last 72 hours.         ______________________  Medications:    Current Facility-Administered Medications   Medication Dose Route Frequency    TPN ADULT - CENTRAL   IntraVENous CONTINUOUS    TPN ADULT - CENTRAL   IntraVENous CONTINUOUS    morphine CR (MS CONTIN) tablet 115 mg  115 mg Oral QHS    morphine CR (MS CONTIN) tablet 100 mg  100 mg Oral Q24H    morphine CR (MS CONTIN) tablet 100 mg  100 mg Oral Q24H    morphine injection 5 mg  5 mg IntraVENous Q3H PRN    aspirin (ASPIRIN) tablet 325 mg  325 mg Oral DAILY    HYDROmorphone (PF) 25 mg/50 mL (DILAUDID) PCA   IntraVENous CONTINUOUS    0.9% sodium chloride infusion 250 mL  250 mL IntraVENous PRN    acetaminophen (TYLENOL) tablet 650 mg  650 mg Oral Q6H PRN    diphenhydrAMINE (BENADRYL) capsule 25 mg  25 mg Oral Q6H PRN    enoxaparin (LOVENOX) injection 40 mg  40 mg SubCUTAneous Q24H    octreotide (SANDOSTATIN) injection 50 mcg  50 mcg IntraVENous TID    fat emulsion 20% (LIPOSYN, INTRAlipid) infusion 250 mL  250 mL IntraVENous Q MON, WED & FRI    0.9% sodium chloride infusion 250 mL  250 mL IntraVENous PRN    0.9% sodium chloride infusion 250 mL  250 mL IntraVENous PRN    nystatin (MYCOSTATIN) 100,000 unit/gram cream   Topical BID    prochlorperazine (COMPAZINE) with saline injection 5 mg  5 mg IntraVENous Q6H PRN    naloxone (NARCAN) injection 0.4 mg  0.4 mg IntraVENous EVERY 2 MINUTES AS NEEDED    0.9% sodium chloride infusion 250 mL  250 mL IntraVENous PRN    glucose chewable tablet 16 g  4 Tab Oral PRN    dextrose (D50W) injection syrg 12.5-25 g  12.5-25 g IntraVENous PRN    glucagon (GLUCAGEN) injection 1 mg  1 mg IntraMUSCular PRN    insulin lispro (HUMALOG) injection   SubCUTAneous Q6H    scopolamine (TRANSDERM-SCOP) 1 mg over 3 days 1 Patch  1 Patch TransDERmal Q72H    sodium chloride (NS) flush 20 mL  20 mL InterCATHeter PRN    sodium chloride (NS) flush 10 mL  10 mL InterCATHeter Q24H    sodium chloride (NS) flush 10 mL  10 mL InterCATHeter PRN    sodium chloride (NS) flush 10 mL  10 mL InterCATHeter Q8H    alteplase (CATHFLO) 1 mg in sterile water (preservative free) 1 mL injection  1 mg InterCATHeter PRN    bacitracin 500 unit/gram packet 1 Packet  1 Packet Topical PRN    sodium chloride (NS) flush 20 mL  20 mL InterCATHeter PRN    sodium chloride (NS) flush 10 mL  10 mL InterCATHeter Q24H    sodium chloride (NS) flush 10 mL  10 mL InterCATHeter PRN    sodium chloride (NS) flush 10 mL  10 mL InterCATHeter Q8H    dronabinol (MARINOL) capsule 2.5 mg  2.5 mg Oral BID    phenol throat spray (CHLORASEPTIC) 1 Spray  1 Spray Oral PRN    ondansetron (ZOFRAN) injection 4 mg  4 mg IntraVENous Q4H PRN    promethazine (PHENERGAN) 12.5 mg in 0.9% sodium chloride 50 mL IVPB  12.5 mg IntraVENous Q6H PRN    LORazepam (ATIVAN) injection 1 mg  1 mg IntraVENous Q6H PRN    albuterol (PROVENTIL VENTOLIN) nebulizer solution 2.5 mg  2.5 mg Nebulization Q4H PRN       Flako Del Toro PA-C  3/29/2018

## 2018-03-29 NOTE — PROGRESS NOTES
Problem: Mobility Impaired (Adult and Pediatric)  Goal: *Acute Goals and Plan of Care (Insert Text)  Physical Therapy Goals  Revised 3/27/2018  1. Patient will move from supine to sit and sit to supine  in bed with moderate assistance  within 7 day(s). 2.  Patient will transfer from bed to chair and chair to bed with moderate assistance  using the least restrictive device within 7 day(s). 3.  Patient will perform sit to stand with moderate assistance  within 7 day(s). 4.  Patient will ambulate 100' with moderate assist x1 using the least restrictive device within 7 day(s). Revisited 3/19/2018, goals remain appropriate, carry over    Physical Therapy Goals  Initiated 3/8/2018  1. Patient will move from supine to sit and sit to supine  in bed with moderate assistance  within 7 day(s). 2.  Patient will transfer from bed to chair and chair to bed with moderate assistance  using the least restrictive device within 7 day(s). 3.  Patient will perform sit to stand with moderate assistance  within 7 day(s). 4.  PT will assess gait with the least restrictive device within 7 day(s). physical Therapy TREATMENT  Patient: Ryan Mendoza (38 y.o. female)  Date: 3/29/2018  Diagnosis: FISTULA  Small bowel fistula <principal problem not specified>  Procedure(s) (LRB):  EXPLORATORY LAPAROTOMY, LYSIS OF ADHESION X 5HOURS, SMALL BOWEL FISTULA TAKE DOWN X 2 AND WOUND VAC PLACEMENT (N/A) 22 Days Post-Op  Precautions:    Chart, physical therapy assessment, plan of care and goals were reviewed. ASSESSMENT:  Chart reviewed, RN cleared patient for mobility, and patient received in bed with mother present. Patient continues to progress every day however continues to be limited by anxiety and fear of falling. HR increased up to 167 however patient's mobility improved with demos sit to stand transfer Min A x 2 and ambulate 6 feet with RW with CGA x 1 with additional hand for line management.  Patient ended session in chair education of the importance to repetition, sequencing, activity prescription. PT recommending patient transfer to chair 2-3x/day with A x 2 as able and appropriate. Rec: Rehab    Progression toward goals:  [x]    Improving appropriately and progressing toward goals  []    Improving slowly and progressing toward goals  []    Not making progress toward goals and plan of care will be adjusted     PLAN:  Patient continues to benefit from skilled intervention to address the above impairments. Continue treatment per established plan of care. Discharge Recommendations:  Rehab  Further Equipment Recommendations for Discharge:  TBD     SUBJECTIVE:   Patient stated Its just all in my head.     OBJECTIVE DATA SUMMARY:   Critical Behavior:  Neurologic State: Alert  Orientation Level: Oriented X4  Cognition: Appropriate decision making     Functional Mobility Training:  Bed Mobility:     Supine to Sit:  (bed mechanics)  Sit to Supine:  (bed mechanics)           Transfers:  Sit to Stand: Assist x2;Contact guard assistance  Stand to Sit: Assist x2;Contact guard assistance                             Balance:  Sitting: Without support; Impaired  Sitting - Static: Fair (occasional)  Sitting - Dynamic: Fair (occasional)  Standing: Impaired  Standing - Static: Constant support; Fair  Standing - Dynamic : Poor (+/Fair -)  Ambulation/Gait Training:  Distance (ft): 8 Feet (ft)  Assistive Device: Gait belt;Walker, rolling  Ambulation - Level of Assistance: Moderate assistance        Gait Abnormalities: Decreased step clearance        Base of Support: Widened     Speed/Liz: Shuffled;Pace decreased (<100 feet/min); Slow (forward)  Step Length: Left shortened;Right shortened                    Stairs:              Neuro Re-Education:    Therapeutic Exercises:   Reviewed LE arom and activity prescription  Pain:  Pain Scale 1: Numeric (0 - 10)  Pain Intensity 1: 7  Pain Location 1: Abdomen  Pain Orientation 1: Anterior  Pain Description 1: Sore  Pain Intervention(s) 1: Encouraged PCA  Activity Tolerance:   Fair, HR elevated with minimal activity, recovered quickly, improved functional status  Please refer to the flowsheet for vital signs taken during this treatment.   After treatment:   []    Patient left in no apparent distress sitting up in chair  [x]    Patient left in no apparent distress in bed  [x]    Call bell left within reach  [x]    Nursing notified  [x]    Caregiver present  []    Bed alarm activated    COMMUNICATION/COLLABORATION:   The patients plan of care was discussed with: Registered Nurse    Yash rCuz PT, DPT   Time Calculation: 25 mins

## 2018-03-29 NOTE — PROGRESS NOTES
Palliative Medicine Consult  [place]: [phone] ([phone])    Patient Name: Alexis Stearns  YOB: 1975    Date of Initial Consult: 3/12/18  Reason for Consult: Pain management, chronic and post op   Requesting Provider: Rajni   Primary Care Physician: Rehana Arreola MD     SUMMARY:   Alexis Stearns is a 38 y.o. with a past history of Crohn's disease (followed by Dr Kerri Oleary), mult surgeries s/p total colectomy w/ end ileostomy, 6/2017 ex lap with small bowel perforation, 6/28/17 ex lap, LPA, repair or enterotomy, SMA stent on L, 7/16/2917 ex lap, KATHRINE, fistula exclusion w/ feeding tube placement and prolonged TPN who was admitted on 3/7/2018 from home for planned surgery, s/p ex lap w/ extensive KATHRINE and small bowel fistula take down, wound vac placement. 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 Known to our team during past hospitalizations. Has had lengthy hospital stays w/ mult complications. Has required [facility] stays in past. Has chronic pain from Crohn's disease (and has not been able to tolerate home Crohn's meds recently due to acute issues- had been on steroids, stopped prior to surgery). Discussion of Remicaide in future. Current medical issues leading to Palliative Medicine involvement include: pain management. Patient's mother, Audie Madsen is NOK.  reviewed, no abberrancies- one prescriber for opioids and one pharmacy. Most recently filled 3/9/18: MSContin 100mg tid and Morphine IR 30mg- 8 tabs a day. Has been tried on equivalent dose of Fentanyl patch w/out same benefit. Discussed Methadone but would have to check w/ her prescriber. PALLIATIVE DIAGNOSES:   Acute post operative pain in abdomen(EXPLORATORY LAPAROTOMY, LYSIS OF ADHESION X 5HOURS, SMALL BOWEL FISTULA TAKE DOWN X 2 AND WOUND VAC PLACEMENT day 19th )   1. Chronic abdominal pain from Cronh's disease and hx of surgeries  2. Nausea  3. Anxiety   4.  Feeding difficulties due to medical condition- has been on TPN for several months  5. Edema   6. L ankle pain s/p fall, normal XRs- improving   7. Grief/depressive sx   8. Constipation   9. Opioid tolerance      PLAN:     Abd pain from surgical incision and wound care as well as underlying chronic pain from Crohn's which is worse when off her steroids. Has high opioid tolerance, no aberrancies on . We have done opioid rotation in the hospital for her in past.     Pain :      1. currently Dilaudid PCA 0.6 mg q 6 mins, no basal ( 29 hits and 24 deliveries yesterday in 1 2hours. 2.  ms contin to 115 mg q hs , due to uncontrol pain at night , continue with 100 mg of ms contin in morning and  afternoon ( home dose of MS contin is 100 mg tid)  4. Continue morphine 5mg IV q3h for severe pain; can use if SBP > 90; if SBP < 90, would recheck a few times, if persistently low would consider notifying primary service    5. Premedicate with morphine before daily care and wound change . 6. Will plan to switch completely to oral regimen in next few days, if issues with oral absorption of opioids, consider fentanyl patch and morphine concentrated liquid for break through pain . 5. Following to assist w/ transition to po and for emotional support as pt needs. PC has spoken w/ Dr Fariha Rinaldi (sees pt for her pain) and he is okay w/ continuing home regimen upon discharge if needed. Dr. Kemi Morales explained to pt she does not recommend higher doses when she leaves. 6. Met with her mom Nicolette Santiago, She reports one month supply of MS contin and MSIR prescibed by Dr Brook Hart . encouraged,  mom to make an appointment with Dr Brook Hart , her mom is waiting for plan of discharge time frame , in speaking to dr Antonia Bull, patient may be able to discharged in a week time . 7. Bowel regimen, anti-emetics per surgery, nausea with octreotide( nausea side effect  (5% to 61% )UPTODATE. 4. Nutrition : on chronic TPN    5.  Psychosocial/spritual : spirits are positive,  her mom is main support, her  is in close touch with her , see  Bhumika Lala notes. Communicated plan of care with: Palliative IDT; bed side Rn Romario. GOALS OF CARE / TREATMENT PREFERENCES:     GOALS OF CARE:  Patient/Health Care Proxy Stated Goals: Rehabilitation      TREATMENT PREFERENCES:   Code Status: Full Code    Advance Care Planning:  Advance Care Planning 3/15/2018   Patient's Healthcare Decision Maker is: Named in scanned ACP document   Primary Decision Maker Name Julio Cesar Wolff   Primary Decision Maker Phone Number [phone]   Primary Decision Maker Relationship to Patient Parent   Secondary Decision Maker Name Marisol Stokes   Secondary Decision Maker Phone Number [phone]   Secondary Decision Maker Relationship to Patient Unknown   Confirm Advance Directive Yes, on file   Patient Would Like to Complete Advance Directive -   Does the patient have other document types -       Medical Interventions: Full interventions           Other:    As far as possible, the palliative care team has discussed with patient / health care proxy about goals of care / treatment preferences for patient. HISTORY:    patient says pain varies \" up and down \", currently 7/10, her base line pain is 5/10. Notes nausea with octreotide(nause side effect is 5-61%( UPTO DATE )    Blood pressure is at base [id number]/51. I/v fluids discontinued because of fluid retention and edema. Reviewed vitals, I/O's, labs, imaging, MAR, notes  Tmax 100.3, , on room air, \"alert\"    Na and cr wnl, lft not elevated, albumin 1.1          MAR includes   Tylenol,  last dose is 2200 3/27/18  Albuterol prn, no recent dose  Asa 325mg  Benadryl, no recent dose  Lasix 20mg iv daily  Marinol  Dilaudid pca, 0 basal , 0.5mg every 6min pca dose  Morphine 5mg IV q3h PRN, 3 doses today.   Ms contin 115 q hs, 1000 mg in morning and 100 mg in afternoon (started 3/28/18 )  Ativan 1mg IV prn, recent dose 1700 3/28/18  Zofran, 4 doses in last 24 hours. Compazine, last dose 2200(3/26/18 )  Phenergan, no recent dose  Octreotide. Scopolamine patch        HPI/SUBJECTIVE:    The patient is:   [x] Verbal and participatory  [] Non-participatory due to:     Pain severe this morning during my visit -- pca has run out, awaiting new cartridge. RN getting PRN morphine dose now. No nausea, no sob. 3/26/18: pain is up and down , 7/10 now, base line pain is 5/10, she reports nausea with octeotide, relieved with zofran.    3/27/18:  Pain is stable , 6/10, able to sleep, this morning pain is 8/10. Nausea with octreotide controlled with zofran . 3/28/18: currently pain is incresed after wound care , stabbing , pain flares up at night and with coughing and deep breathing . She is in good spirits, sat in recliner chair . Nausea only with octreotide. 3/29/18 : pain better at night , increase in ms contin helped . Nausea only with octreotide. She is out of bed in chair. Clinical Pain Assessment (nonverbal scale for severity on nonverbal patients):   Clinical Pain Assessment  Severity: 7  Location: abdomen   Character: sull, stabbing   Duration: chronic , acute since surgery  Effect: pain incraeses with daily care and dressing   Factors: increase in apin with wound care.   Frequency: constant           Duration: for how long has pt been experiencing pain (e.g., 2 days, 1 month, years)  Frequency: how often pain is an issue (e.g., several times per day, once every few days, constant)     FUNCTIONAL ASSESSMENT:     Palliative Performance Scale (PPS):  PPS: 50       PSYCHOSOCIAL/SPIRITUAL SCREENING:     Palliative IDT has assessed this patient for cultural preferences / practices and a referral made as appropriate to needs (Cultural Services, Patient Advocacy, Ethics, etc.)    Advance Care Planning:  Advance Care Planning 3/15/2018   Patient's Healthcare Decision Maker is: Named in scanned ACP document   Primary Decision Maker Name Sky Palomino   Primary Decision Maker Phone Number [phone]   Primary Decision Maker Relationship to Patient Parent   Secondary Decision Maker Name Telly Nath Phone Number [phone]   Secondary Decision Maker Relationship to Patient Unknown   Confirm Advance Directive Yes, on file   Patient Would Like to Complete Advance Directive -   Does the patient have other document types -       Any spiritual / Gnosticist concerns:  [] Yes /  [x] No    Caregiver Burnout:  [] Yes /  [x] No /  [] No Caregiver Present      Anticipatory grief assessment:   [x] Normal  / [] Maladaptive       ESAS Anxiety: Anxiety: 0    ESAS Depression: Depression: 0        REVIEW OF SYSTEMS:     Positive and pertinent negative findings in ROS are noted above in HPI. The following systems were [x] reviewed / [] unable to be reviewed as noted in HPI  Other findings are noted below. Systems: constitutional, ears/nose/mouth/throat, respiratory, gastrointestinal, genitourinary, musculoskeletal, integumentary, neurologic, psychiatric, endocrine. Positive findings noted below. Modified ESAS Completed by: provider   Fatigue: 5 Drowsiness: 0   Depression: 0 Pain: 7   Anxiety: 0 Nausea: 3 (due to octreotide.)   Anorexia: 6 Dyspnea: 0     Constipation: No              PHYSICAL EXAM:     From RN flowsheet:  Wt Readings from Last 3 Encounters:   03/29/18 (!) 359 lb 5.6 oz (163 kg)   03/05/18 304 lb 3.2 oz (138 kg)   02/28/18 306 lb (138.8 kg)     Blood pressure (!) 88/47, pulse (!) 107, temperature 98.5 °F (36.9 °C), resp. rate 18, height 5' 4\" (1.626 m), weight (!) 359 lb 5.6 oz (163 kg), SpO2 98 %.     Pain Scale 1: Numeric (0 - 10)  Pain Intensity 1: 7  Pain Onset 1: post op  Pain Location 1: Abdomen  Pain Orientation 1: Anterior  Pain Description 1: Sore  Pain Intervention(s) 1: Encouraged PCA  Last bowel movement, if known: stool output in ostomy     Constitutional: awake, alert, oriented, obese built.  Eyes: pupils equal, anicteric  ENMT: moist mucous membranes  Cardiovascular: regular rhythm  Respiratory: breathing not labored, clear anteriorly   Gastrointestinal: soft , hypoactive bowel sounds, colostomy bag in place, big midline incision , with dressing . Musculoskeletal: no deformity  Skin: warm, dry, pale  Ext: B/l pitting LE edema  Neurologic: following commands, moving all extremities  Psychiatric: full affect, no hallucinations         HISTORY:     Active Problems:    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)      Past Medical History:   Diagnosis Date    Adverse effect of anesthesia     WOKE DURING SURGERY    Arthritis     Blood clot in vein 2017    STOMACH    Crohn's disease ([address] 8/15/2011    DVT (deep venous thrombosis) ([facility] 8/15/2011    LEFT LEG    Edema     GENERALIZED R/T TPN; WAIST DOWN    Incarcerated ventral hernia 8/15/2011    Lupus     Lyme disease     Psychiatric disorder     ANXIETY    Right flank pain 8/22/2011    Seizures ([facility] 1990    LAST AT AGE 17    Stroke [facility]) 1990    age 17; A WEEK POST OP, HAD SEIZURES AND STROKE, WAS IN COMA FOR A MONTH    Thyroid disease     HYPO-NO MEDS      Past Surgical History:   Procedure Laterality Date    HX APPENDECTOMY      HX GYN  2015    HIDRADENTIS LABIA    HX OTHER SURGICAL      multiple procedures related to her Crohn's disease    HX OTHER SURGICAL      ABDOMINAL SURGERY REMOVING COLON, 2/3 STOMACH, 1/3 SMALL INTESTINE    ME LAP, INCISIONAL HERNIA REPAIR,INCARCERATED  9-10-08    dr. Rosemary Bourgeois      Family History   Problem Relation Age of Onset    Hypertension Father     Stroke Father     Other Father      arthritis    Arthritis-osteo Father     Hypertension Mother     Arthritis-osteo Mother     Anesth Problems Neg Hx       History reviewed, no pertinent family history.   Social History   Substance Use Topics    Smoking status: Former Smoker     Packs/day: 1.00     Years: 16.00     Quit date: 2/27/2017    Smokeless tobacco: Former User      Comment: OFF AND ON    Alcohol use No     Allergies   Allergen Reactions    Sulfa (Sulfonamide Antibiotics) Anaphylaxis    Demerol [Meperidine] Rash    Soma [Carisoprodol] Rash and Nausea Only    Toradol [Ketorolac Tromethamine] Nausea and Vomiting      Current Facility-Administered Medications   Medication Dose Route Frequency    TPN ADULT - CENTRAL   IntraVENous CONTINUOUS    albumin human 5% (BUMINATE) solution 25 g  25 g IntraVENous Q6H    TPN ADULT - CENTRAL   IntraVENous CONTINUOUS    morphine CR (MS CONTIN) tablet 115 mg  115 mg Oral QHS    morphine CR (MS CONTIN) tablet 100 mg  100 mg Oral Q24H    morphine CR (MS CONTIN) tablet 100 mg  100 mg Oral Q24H    morphine injection 5 mg  5 mg IntraVENous Q3H PRN    aspirin (ASPIRIN) tablet 325 mg  325 mg Oral DAILY    HYDROmorphone (PF) 25 mg/50 mL (DILAUDID) PCA   IntraVENous CONTINUOUS    0.9% sodium chloride infusion 250 mL  250 mL IntraVENous PRN    acetaminophen (TYLENOL) tablet 650 mg  650 mg Oral Q6H PRN    diphenhydrAMINE (BENADRYL) capsule 25 mg  25 mg Oral Q6H PRN    enoxaparin (LOVENOX) injection 40 mg  40 mg SubCUTAneous Q24H    octreotide (SANDOSTATIN) injection 50 mcg  50 mcg IntraVENous TID    fat emulsion 20% (LIPOSYN, INTRAlipid) infusion 250 mL  250 mL IntraVENous Q MON, WED & FRI    0.9% sodium chloride infusion 250 mL  250 mL IntraVENous PRN    0.9% sodium chloride infusion 250 mL  250 mL IntraVENous PRN    nystatin (MYCOSTATIN) 100,000 unit/gram cream   Topical BID    prochlorperazine (COMPAZINE) with saline injection 5 mg  5 mg IntraVENous Q6H PRN    naloxone (NARCAN) injection 0.4 mg  0.4 mg IntraVENous EVERY 2 MINUTES AS NEEDED    0.9% sodium chloride infusion 250 mL  250 mL IntraVENous PRN    glucose chewable tablet 16 g  4 Tab Oral PRN    dextrose (D50W) injection syrg 12.5-25 g  12.5-25 g IntraVENous PRN    glucagon (GLUCAGEN) injection 1 mg  1 mg IntraMUSCular PRN    insulin lispro (HUMALOG) injection   SubCUTAneous Q6H    scopolamine (TRANSDERM-SCOP) 1 mg over 3 days 1 Patch  1 Patch TransDERmal Q72H    sodium chloride (NS) flush 20 mL  20 mL InterCATHeter PRN    sodium chloride (NS) flush 10 mL  10 mL InterCATHeter Q24H    sodium chloride (NS) flush 10 mL  10 mL InterCATHeter PRN    sodium chloride (NS) flush 10 mL  10 mL InterCATHeter Q8H    alteplase (CATHFLO) 1 mg in sterile water (preservative free) 1 mL injection  1 mg InterCATHeter PRN    bacitracin 500 unit/gram packet 1 Packet  1 Packet Topical PRN    sodium chloride (NS) flush 20 mL  20 mL InterCATHeter PRN    sodium chloride (NS) flush 10 mL  10 mL InterCATHeter Q24H    sodium chloride (NS) flush 10 mL  10 mL InterCATHeter PRN    sodium chloride (NS) flush 10 mL  10 mL InterCATHeter Q8H    dronabinol (MARINOL) capsule 2.5 mg  2.5 mg Oral BID    phenol throat spray (CHLORASEPTIC) 1 Spray  1 Spray Oral PRN    ondansetron (ZOFRAN) injection 4 mg  4 mg IntraVENous Q4H PRN    promethazine (PHENERGAN) 12.5 mg in 0.9% sodium chloride 50 mL IVPB  12.5 mg IntraVENous Q6H PRN    LORazepam (ATIVAN) injection 1 mg  1 mg IntraVENous Q6H PRN    albuterol (PROVENTIL VENTOLIN) nebulizer solution 2.5 mg  2.5 mg Nebulization Q4H PRN          LAB AND IMAGING FINDINGS:     Lab Results   Component Value Date/Time    WBC 10.0 03/29/2018 04:29 AM    HGB 7.0 (L) 03/29/2018 04:29 AM    PLATELET 745 51/07/6656 04:29 AM     Lab Results   Component Value Date/Time    Sodium 139 03/29/2018 04:29 AM    Potassium 4.4 03/29/2018 04:29 AM    Chloride 109 (H) 03/29/2018 04:29 AM    CO2 24 03/29/2018 04:29 AM    BUN 14 03/29/2018 04:29 AM    Creatinine 0.58 03/29/2018 04:29 AM    Calcium 8.3 (L) 03/29/2018 04:29 AM    Magnesium 1.7 03/29/2018 04:29 AM    Phosphorus 4.7 03/23/2018 05:36 AM      Lab Results   Component Value Date/Time    AST (SGOT) 10 (L) 03/29/2018 04:29 AM    Alk.  phosphatase 95 03/29/2018 04:29 AM    Protein, total 6.7 03/29/2018 04:29 AM Albumin 1.2 (L) 03/29/2018 04:29 AM    Globulin 5.5 (H) 03/29/2018 04:29 AM     Lab Results   Component Value Date/Time    INR 1.0 12/07/2017 08:37 AM    Prothrombin time 10.0 12/07/2017 08:37 AM      No results found for: IRON, FE, TIBC, IBCT, PSAT, FERR   No results found for: PH, PCO2, PO2  No components found for: GLPOC   No results found for: CPK, CKMB             Total time: 25 mins  Counseling / coordination time, spent as noted above: 20 mins  > 50% counseling / coordination?:     Prolonged service was provided for  []30 min   []75 min in face to face time in the presence of the patient, spent as noted above. Time Start:   Time End:   Note: this can only be billed with 85653 (initial) or 03820 (follow up). If multiple start / stop times, list each separately.

## 2018-03-29 NOTE — WOUND CARE
WOCN Note:     Follow-up visit for abdominal wound. Seen today with АНДРЕЙ Deleon. Chart shows:  Admitted for fistula takdown 3/7/18 by Dr. Davalos Comes with [facility] placement in [address] [place] [address]; history of multiple abdominal surgeries and Crohns disease. Admitted from home.      Assessment:   Patient is A&O x 4 and mobile but debilitated s/p abdominal surgery. She uses the female urinal.   Bed: total care bariatric   Using PCA for pain.       1. Midline abdominal surgical wound = 23 x 15 x 5 cm. 90% pink moist wound base and 5% scattered devitalized stained areas of green and 5% stomatized mucus membrane with peristalsis. Lots of new granulation noted. Loose sutures noted distally. Dark green in wall cannister that is connected via 2 red rubber caths resting in central and distal wound bed. Wound base is protected from red rubber caths by contact layer of Mepitel One  2 rolls of moist gauze packing placed around and red rubber secured to abdomen with tape and connected to wall suction @ 50 with \"Y\" connector. ABD toppers and secured with tape.       Colostomy: stoma is red & moist; Small amount of mucoid tan output; 2-piece flat pouch placed today. Supplies left in room.       Groin, perinuem, gluteal cleft, and perianal skin with maceration from MASD. Cleaned well and barrier cream applied.      Wound Recommendations:    Continue wound care as ordered - no changes today. May be able to isolate stoma in near future and apply NPWT to rest of wound. Skin Care & Pressure Relief Recommendations:  Minimize layers of linen/pads under patient to optimize support surface. Turn/reposition approximately every 2 hours and offload heels. Discussed above plan with patient & RN, Jad Arenas. Transition of Care: Plan to follow as needed while admitted to hospital with possible VAC placement on Monday.      SONY Lemons, RN, [facility] [place]  Certified Wound, Ostomy, Continence Nurse  office [phone]  pager [phone] or call  to page

## 2018-03-30 LAB
ERYTHROCYTE [DISTWIDTH] IN BLOOD BY AUTOMATED COUNT: 18.1 % (ref 11.5–14.5)
GLUCOSE BLD STRIP.AUTO-MCNC: 135 MG/DL (ref 65–100)
GLUCOSE BLD STRIP.AUTO-MCNC: 142 MG/DL (ref 65–100)
GLUCOSE BLD STRIP.AUTO-MCNC: 150 MG/DL (ref 65–100)
GLUCOSE BLD STRIP.AUTO-MCNC: 153 MG/DL (ref 65–100)
GLUCOSE BLD STRIP.AUTO-MCNC: 155 MG/DL (ref 65–100)
HCT VFR BLD AUTO: 23.5 % (ref 35–47)
HGB BLD-MCNC: 7.3 G/DL (ref 11.5–16)
MCH RBC QN AUTO: 30.3 PG (ref 26–34)
MCHC RBC AUTO-ENTMCNC: 31.1 G/DL (ref 30–36.5)
MCV RBC AUTO: 97.5 FL (ref 80–99)
NRBC # BLD: 0.04 K/UL (ref 0–0.01)
NRBC BLD-RTO: 0.5 PER 100 WBC
PLATELET # BLD AUTO: 346 K/UL (ref 150–400)
PMV BLD AUTO: 9.9 FL (ref 8.9–12.9)
RBC # BLD AUTO: 2.41 M/UL (ref 3.8–5.2)
SERVICE CMNT-IMP: ABNORMAL
WBC # BLD AUTO: 8.1 K/UL (ref 3.6–11)

## 2018-03-30 PROCEDURE — 74011250637 HC RX REV CODE- 250/637: Performed by: NURSE PRACTITIONER

## 2018-03-30 PROCEDURE — 74011636637 HC RX REV CODE- 636/637: Performed by: SURGERY

## 2018-03-30 PROCEDURE — 97530 THERAPEUTIC ACTIVITIES: CPT

## 2018-03-30 PROCEDURE — 74011250637 HC RX REV CODE- 250/637: Performed by: PHYSICIAN ASSISTANT

## 2018-03-30 PROCEDURE — 3331090001 HH PPS REVENUE CREDIT

## 2018-03-30 PROCEDURE — 74011250636 HC RX REV CODE- 250/636: Performed by: SURGERY

## 2018-03-30 PROCEDURE — 74011000250 HC RX REV CODE- 250: Performed by: SURGERY

## 2018-03-30 PROCEDURE — 36415 COLL VENOUS BLD VENIPUNCTURE: CPT | Performed by: SURGERY

## 2018-03-30 PROCEDURE — 74011250637 HC RX REV CODE- 250/637: Performed by: INTERNAL MEDICINE

## 2018-03-30 PROCEDURE — 3331090002 HH PPS REVENUE DEBIT

## 2018-03-30 PROCEDURE — 74011250636 HC RX REV CODE- 250/636: Performed by: INTERNAL MEDICINE

## 2018-03-30 PROCEDURE — 82962 GLUCOSE BLOOD TEST: CPT

## 2018-03-30 PROCEDURE — 74011250637 HC RX REV CODE- 250/637: Performed by: SURGERY

## 2018-03-30 PROCEDURE — 74011000258 HC RX REV CODE- 258: Performed by: SURGERY

## 2018-03-30 PROCEDURE — 85027 COMPLETE CBC AUTOMATED: CPT | Performed by: SURGERY

## 2018-03-30 PROCEDURE — 65660000000 HC RM CCU STEPDOWN

## 2018-03-30 PROCEDURE — 74011250636 HC RX REV CODE- 250/636: Performed by: NURSE PRACTITIONER

## 2018-03-30 PROCEDURE — 97110 THERAPEUTIC EXERCISES: CPT

## 2018-03-30 RX ADMIN — Medication 10 ML: at 10:15

## 2018-03-30 RX ADMIN — DRONABINOL 2.5 MG: 2.5 CAPSULE ORAL at 10:14

## 2018-03-30 RX ADMIN — ENOXAPARIN SODIUM 40 MG: 100 INJECTION SUBCUTANEOUS at 10:14

## 2018-03-30 RX ADMIN — Medication 10 ML: at 05:30

## 2018-03-30 RX ADMIN — DIPHENHYDRAMINE HYDROCHLORIDE 25 MG: 25 CAPSULE ORAL at 18:27

## 2018-03-30 RX ADMIN — OCTREOTIDE ACETATE 50 MCG: 50 INJECTION, SOLUTION INTRAVENOUS; SUBCUTANEOUS at 10:14

## 2018-03-30 RX ADMIN — OCTREOTIDE ACETATE 50 MCG: 50 INJECTION, SOLUTION INTRAVENOUS; SUBCUTANEOUS at 17:25

## 2018-03-30 RX ADMIN — Medication: at 21:54

## 2018-03-30 RX ADMIN — INSULIN LISPRO 3 UNITS: 100 INJECTION, SOLUTION INTRAVENOUS; SUBCUTANEOUS at 13:31

## 2018-03-30 RX ADMIN — ASCORBIC ACID: 500 INJECTION, SOLUTION INTRAMUSCULAR; INTRAVENOUS; SUBCUTANEOUS at 18:28

## 2018-03-30 RX ADMIN — MORPHINE SULFATE 5 MG: 4 INJECTION, SOLUTION INTRAMUSCULAR; INTRAVENOUS at 15:54

## 2018-03-30 RX ADMIN — ONDANSETRON HYDROCHLORIDE 4 MG: 2 INJECTION INTRAMUSCULAR; INTRAVENOUS at 21:52

## 2018-03-30 RX ADMIN — Medication 10 ML: at 15:55

## 2018-03-30 RX ADMIN — INSULIN LISPRO 3 UNITS: 100 INJECTION, SOLUTION INTRAVENOUS; SUBCUTANEOUS at 06:17

## 2018-03-30 RX ADMIN — MORPHINE SULFATE 100 MG: 100 TABLET, FILM COATED, EXTENDED RELEASE ORAL at 10:14

## 2018-03-30 RX ADMIN — ONDANSETRON HYDROCHLORIDE 4 MG: 2 INJECTION INTRAMUSCULAR; INTRAVENOUS at 10:14

## 2018-03-30 RX ADMIN — ONDANSETRON HYDROCHLORIDE 4 MG: 2 INJECTION INTRAMUSCULAR; INTRAVENOUS at 17:25

## 2018-03-30 RX ADMIN — Medication: at 06:15

## 2018-03-30 RX ADMIN — OCTREOTIDE ACETATE 50 MCG: 50 INJECTION, SOLUTION INTRAVENOUS; SUBCUTANEOUS at 21:52

## 2018-03-30 RX ADMIN — I.V. FAT EMULSION 250 ML: 20 EMULSION INTRAVENOUS at 18:28

## 2018-03-30 RX ADMIN — DRONABINOL 2.5 MG: 2.5 CAPSULE ORAL at 17:25

## 2018-03-30 RX ADMIN — MORPHINE SULFATE 5 MG: 10 INJECTION, SOLUTION INTRAMUSCULAR; INTRAVENOUS at 10:10

## 2018-03-30 RX ADMIN — MORPHINE SULFATE 115 MG: 15 TABLET, EXTENDED RELEASE ORAL at 22:00

## 2018-03-30 RX ADMIN — ASPIRIN 325 MG: 325 TABLET ORAL at 10:14

## 2018-03-30 RX ADMIN — MORPHINE SULFATE 100 MG: 100 TABLET, FILM COATED, EXTENDED RELEASE ORAL at 15:53

## 2018-03-30 NOTE — HOME CARE
Home Health- Patient readmitted 3/29- previous patient of [facility], due to multiple missed visits and homebound status; [facility] will be able to see patient on discharge for [facility] / 1-2 visits  Katherine Cao CM advised and will obtain MD orders for Home Health  on discharge.     Georgetta Habermann RN [facility] Liaison

## 2018-03-30 NOTE — PROGRESS NOTES
NUTRITION       Recommendations:  1. Check triglycerides    2. Continue TPN at current goal and transition to 14 hour-cyclic regimen as she is closer to discharge and a cyclic regimen will help prevent cholestasis (see below)    3. Consider sending request to pharmacy for daily trace elements and zinc sulfate 10 mg daily x 10 days for pt with long term TPN needs and fistula (these are automatically adjusted to 3x/week)    4. Continue to monitor BG for tight control to optimize wound healing    5. If wound healing continues to be a barrier to vac placement and discharge, consider increasing AA to 6%    Brief follow up. Chart reviewed, discussed with RN and team during interdisciplinary rounds. Appreciate standing scale weight obtained yesterday (30# difference from bedscale). Recalculated estimated needs and will likely need to continue adjustments based on edema. TPN: 5%AA D20 @ 100 mL/hr + MVI, thiamine (100 mg), 20 units insulin/L, ascorbic acid 500 mg, zinc sulfate 10 mg (3x/week), trace elements (3x/week) + 20% lipids, 250 mL 3x/week. -- This provides a daily average of 2326 kcal, 120 g protein in 2400 mL (2650 mL on lipid days)-- meeting 100% of estimated needs. GIR: 2.2 mg/kg/min (limited accuracy secondary to edema). -- Once cyclic pursued, would recommend 90 mL/hr x 1 hour; 185 mL/hr x 12 hours and 90 mL/hr x last hour. If wound healing continues to be compromised, would increase AA to 6%. This will adjust average daily nutrition to 2422 kcal, 144 g protein and 2400 mL. BG ~140-160. TPN is providing 1 unit/10 gm CHO. Estimated Nutrition Needs:   Kcals/day: 1649 Kcals/day (4441-4313 kcal/day (11-15 kcal/kg))  Protein: 120 g (120-150 kcal/day (0.8-1 g/kg))  Fluid: 2250 ml (or 1 mL/kcal or per output)     Based On: Kcal/kg - specify (Comment)  Weight Used: Actual wt (149.9 kg)    RD to follow.     [address]

## 2018-03-30 NOTE — ROUTINE PROCESS
Bedside shift change report given to MAURI Nieto (oncoming nurse) by Sybil Ro RN (offgoing nurse). Report included the following information SBAR, OR Summary, Procedure Summary, Intake/Output, Recent Results, Med Rec Status and Cardiac Rhythm sinus tachycardia.

## 2018-03-30 NOTE — ROUTINE PROCESS
Bedside and Verbal shift change report given to Arsenio (oncoming nurse) by Morgan Bhakta (offgoing nurse). Report included the following information SBAR, Kardex, MAR, Med Rec Status and Cardiac Rhythm ST.    Report given to [facility]

## 2018-03-30 NOTE — PROGRESS NOTES
CM met with patient and mother to discuss discharge planning. Patient discussed the options of a rehab vs [facility]. Patient preferred [facility] for rehab. Referral sent via [employer]. Awaiting a decision from [facility]. Anthony Durán MSA, RN, CRM.

## 2018-03-30 NOTE — PROGRESS NOTES
Progress Note    Patient: Ryan Mendoza MRN: [id number]  SSN: [id number]    YOB: 1975  Age: 38 y.o. Sex: female      Admit Date: 3/7/2018    23 Days Post-Op    Procedure:  Procedure(s):  EXPLORATORY LAPAROTOMY, LYSIS OF ADHESION X 5HOURS, SMALL BOWEL FISTULA TAKE DOWN X 2 AND WOUND VAC PLACEMENT    Subjective:     No acute surgical issues. Pt with fistula to midline being controlled with wound appliance. Hemoglobin is stable. Ileostomy is minimally productive.     Objective:     Visit Vitals    BP (!) 97/38 (BP 1 Location: Left arm, BP Patient Position: At rest)    Pulse (!) 114    Temp 99.3 °F (37.4 °C)    Resp 18    Ht 5' 4\" (1.626 m)    Wt 330 lb 7.5 oz (149.9 kg)    SpO2 100%    BMI 56.72 kg/m2       Temp (24hrs), Av.1 °F (37.3 °C), Min:98.4 °F (36.9 °C), Max:100.6 °F (38.1 °C)        Physical Exam:    Gen:  NAD  Pulm:  Unlabored  Abd:  S/ND/appropriate TTP  Midline wound:  Large open wound with granulating bed and midline fistula with bilious output    Recent Results (from the past 24 hour(s))   GLUCOSE, POC    Collection Time: 18 11:26 AM   Result Value Ref Range    Glucose (POC) 159 (H) 65 - 100 mg/dL    Performed by CHRISTOPHER Sanders 119, POC    Collection Time: 18  5:20 PM   Result Value Ref Range    Glucose (POC) 150 (H) 65 - 100 mg/dL    Performed by [employer]  PCT    GLUCOSE, POC    Collection Time: 18 11:54 PM   Result Value Ref Range    Glucose (POC) 142 (H) 65 - 100 mg/dL    Performed by Jose Francisco Coho    CBC W/O DIFF    Collection Time: 18  3:55 AM   Result Value Ref Range    WBC 8.1 3.6 - 11.0 K/uL    RBC 2.41 (L) 3.80 - 5.20 M/uL    HGB 7.3 (L) 11.5 - 16.0 g/dL    HCT 23.5 (L) 35.0 - 47.0 %    MCV 97.5 80.0 - 99.0 FL    MCH 30.3 26.0 - 34.0 PG    MCHC 31.1 30.0 - 36.5 g/dL    RDW 18.1 (H) 11.5 - 14.5 %    PLATELET 019 676 - 115 K/uL    MPV 9.9 8.9 - 12.9 FL    NRBC 0.5 (H) 0  WBC    ABSOLUTE NRBC 0.04 (H) 0.00 - 0.01 K/uL GLUCOSE, POC    Collection Time: 03/30/18  6:08 AM   Result Value Ref Range    Glucose (POC) 153 (H) 65 - 100 mg/dL    Performed by Sofie Arriaga          Assessment:     Hospital Problems  Date Reviewed: 10/27/2017          Codes Class Noted POA    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

## 2018-03-30 NOTE — ROUTINE PROCESS
Bedside shift change report given to Amador (oncoming nurse) by Frances Grey (offgoing nurse). Report included the following information SBAR, Kardex, Procedure Summary, Intake/Output, MAR and Recent Results.

## 2018-03-30 NOTE — PROGRESS NOTES
Palliative Medicine Consult  [facility]: [phone] ([phone])    Patient Name: Alexis Stearns  YOB: 1975    Date of Initial Consult: 3/12/18  Reason for Consult: Pain management, chronic and post op   Requesting Provider: Rajni   Primary Care Physician: Rehana Arreola MD     SUMMARY:   Alexis Stearns is a 38 y.o. with a past history of Crohn's disease (followed by Dr Kerri Oleary), mult surgeries s/p total colectomy w/ end ileostomy, 6/2017 ex lap with small bowel perforation, 6/28/17 ex lap, LPA, repair or enterotomy, SMA stent on L, 7/16/2917 ex lap, KATHRINE, fistula exclusion w/ feeding tube placement and prolonged TPN who was admitted on 3/7/2018 from home for planned surgery, s/p ex lap w/ extensive KATHRINE and small bowel fistula take down, wound vac placement. 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 Known to our team during past hospitalizations. Has had lengthy hospital stays w/ mult complications. Has required [facility] stays in past. Has chronic pain from Crohn's disease (and has not been able to tolerate home Crohn's meds recently due to acute issues- had been on steroids, stopped prior to surgery). Discussion of Remicaide in future. Current medical issues leading to Palliative Medicine involvement include: pain management. Patient's mother, Audie Madsen is NOK.  reviewed, no abberrancies- one prescriber for opioids and one pharmacy. Most recently filled 3/9/18: MSContin 100mg tid and Morphine IR 30mg- 8 tabs a day. Has been tried on equivalent dose of Fentanyl patch w/out same benefit. Discussed Methadone but would have to check w/ her prescriber. PALLIATIVE DIAGNOSES:   Acute post operative pain in abdomen(EXPLORATORY LAPAROTOMY, LYSIS OF ADHESION X 5HOURS, SMALL BOWEL FISTULA TAKE DOWN X 2 AND WOUND VAC PLACEMENT day 19th )   1. Chronic abdominal pain from Cronh's disease and hx of surgeries  2. Nausea  3. Anxiety   4.  Feeding difficulties due to medical condition- has been on TPN for several months  5. Edema   6. L ankle pain s/p fall, normal XRs- improving   7. Grief/depressive sx   8. Constipation   9. Opioid tolerance      PLAN:     Abd pain from surgical incision and wound care as well as underlying chronic pain from Crohn's which is worse when off her steroids. Has high opioid tolerance, no aberrancies on . We have done opioid rotation in the hospital for her in past.      Pain :      1. currently Dilaudid PCA 0.6 mg q 6 mins, no basal     2. Discussed to wean off PCA , Next week and place her fully on oral regimen , adding msir 30 mg every 4 hours as needed , today she is not ready because of flare up of pain. 3. On MS contin 115 mg q hs , due to uncontrol pain at night , continue with 100 mg of ms contin in morning and  afternoon ( home dose of MS contin is 100 mg tid),  if issues with oral absorption of opioids, consider fentanyl patch and morphine concentrated liquid for break through pain . 4. Continue morphine 5mg IV q3h for severe pain; can use if SBP > 90; if SBP < 90, would recheck a few times, if persistently low would consider notifying primary service    5. Premedicate with morphine before daily care and wound change . 6. Following to assist w/ transition to po and for emotional support as pt needs. PC has spoken w/ Dr Panchito Iqbal (sees pt for her pain) and he is okay w/ continuing home regimen upon discharge if needed. Dr. Daniel Joseph explained to pt she does not recommend higher doses when she leaves. 6. Met with her mom Addie Fallon, She reports one month supply of MS contin and MSIR prescibed by Dr Elzbieta Banks . encouraged,  mom to make an appointment with Dr Elzbieta Banks , her mom is waiting for plan of discharge time frame , in speaking to dr Hay Mejia, patient may be able to discharged in a week time . 7. Bowel regimen, anti-emetics per surgery, nausea with octreotide( nausea side effect  (5% to 61% )UPTODATE.     4. Nutrition : on chronic TPN    5. Psychosocial/spritual : spirits are positive,  her mom is main support, her  is in close touch with her , see  Bhumika Cuello notes. Communicated plan of care with: Palliative IDT; bed side Rn [phone] / TREATMENT PREFERENCES:     GOALS OF CARE:  Patient/Health Care Proxy Stated Goals:  (get pain better, treatment of acute issues.)      TREATMENT PREFERENCES:   Code Status: Full Code    Advance Care Planning:  Advance Care Planning 3/15/2018   Patient's Healthcare Decision Maker is: Named in scanned ACP document   Primary Decision Maker Name Leanna Strong   Primary Decision Maker Phone Number [phone]   Primary Decision Maker Relationship to Patient Parent   Secondary Decision Maker Name Nichelle Cullen   Secondary Decision Maker Phone Number [phone]   Secondary Decision Maker Relationship to Patient Unknown   Confirm Advance Directive Yes, on file   Patient Would Like to Complete Advance Directive -   Does the patient have other document types -       Medical Interventions: Full interventions           Other:    As far as possible, the palliative care team has discussed with patient / health care proxy about goals of care / treatment preferences for patient. HISTORY:    patient says pain varies \" up and down \", currently 7/10, her base line pain is 5/10. Notes nausea with octreotide(nause side effect is 5-61%( UPTO DATE )    Blood pressure is at base [id number]. I/v fluids discontinued because of fluid retention and edema. Reviewed vitals, I/O's, labs, imaging, MAR, notes  Tmax 100.3, , on room air, \"alert\"    Na and cr wnl, lft not elevated, albumin 1.1          MAR includes   Tylenol,  last dose is 2200 3/27/18  Albuterol prn, no recent dose  Asa 325mg  Benadryl, no recent dose  Lasix 20mg iv daily  Marinol  Dilaudid pca, 0 basal , 0.5mg every 6min pca dose  Morphine 5mg IV q3h PRN, 2 doses today.   Ms contin 115 q hs, 1000 mg in morning and 100 mg in afternoon (started 3/28/18 )  Ativan 1mg IV prn, recent dose 1700 3/28/18  Zofran, 4 doses in last 24 hours. Compazine, last dose 2200(3/26/18 )  Phenergan, no recent dose  Octreotide. Scopolamine patch        HPI/SUBJECTIVE:    The patient is:   [x] Verbal and participatory  [] Non-participatory due to:     Pain severe this morning during my visit -- pca has run out, awaiting new cartridge. RN getting PRN morphine dose now. No nausea, no sob. 3/26/18: pain is up and down , 7/10 now, base line pain is 5/10, she reports nausea with octeotide, relieved with zofran.    3/27/18:  Pain is stable , 6/10, able to sleep, this morning pain is 8/10. Nausea with octreotide controlled with zofran . 3/28/18: currently pain is incresed after wound care , stabbing , pain flares up at night and with coughing and deep breathing . She is in good spirits, sat in recliner chair . Nausea only with octreotide. 3/29/18 : pain better at night , increase in ms contin helped . Nausea only with octreotide. She is out of bed in chair. 3/30/18 : today is not good day for pain , pain is worse since this morning after wound care/removal of some sutures . C/o  Abdominal cramps also . Was out of bed in chair for short period of time . Spirits are positive. Clinical Pain Assessment (nonverbal scale for severity on nonverbal patients):   Clinical Pain Assessment  Severity: 8  Location: all over abdomen   Character: stabbing  Duration: chronic , acute since surgery  Effect: incraesed today after sutures were removed.   Factors: increase with daily care  Frequency: constant          Duration: for how long has pt been experiencing pain (e.g., 2 days, 1 month, years)  Frequency: how often pain is an issue (e.g., several times per day, once every few days, constant)     FUNCTIONAL ASSESSMENT:     Palliative Performance Scale (PPS):  PPS: 50       PSYCHOSOCIAL/SPIRITUAL SCREENING:     Palliative IDT has assessed this patient for cultural preferences / practices and a referral made as appropriate to needs (Cultural Services, Patient Advocacy, Ethics, etc.)    Advance Care Planning:  Advance Care Planning 3/15/2018   Patient's Healthcare Decision Maker is: Named in scanned ACP document   Primary Decision Maker Name Faina Do   Primary Decision Maker Phone Number [phone]   Primary Decision Maker Relationship to Patient Parent   Secondary Decision Maker Name Telly Nath Phone Number [phone]   Secondary Decision Maker Relationship to Patient Unknown   Confirm Advance Directive Yes, on file   Patient Would Like to Complete Advance Directive -   Does the patient have other document types -       Any spiritual / Muslim concerns:  [] Yes /  [x] No    Caregiver Burnout:  [] Yes /  [x] No /  [] No Caregiver Present      Anticipatory grief assessment:   [x] Normal  / [] Maladaptive       ESAS Anxiety: Anxiety: 0    ESAS Depression: Depression: 0        REVIEW OF SYSTEMS:     Positive and pertinent negative findings in ROS are noted above in HPI. The following systems were [x] reviewed / [] unable to be reviewed as noted in HPI  Other findings are noted below. Systems: constitutional, ears/nose/mouth/throat, respiratory, gastrointestinal, genitourinary, musculoskeletal, integumentary, neurologic, psychiatric, endocrine. Positive findings noted below. Modified ESAS Completed by: provider   Fatigue: 5 Drowsiness: 0   Depression: 0 Pain: 8   Anxiety: 0 Nausea: 2   Anorexia: 3 Dyspnea: 0     Constipation: No              PHYSICAL EXAM:     From RN flowsheet:  Wt Readings from Last 3 Encounters:   03/29/18 330 lb 7.5 oz (149.9 kg)   03/05/18 304 lb 3.2 oz (138 kg)   02/28/18 306 lb (138.8 kg)     Blood pressure 108/58, pulse (!) 108, temperature 99.2 °F (37.3 °C), resp.  rate 20, height 5' 4\" (1.626 m), weight 330 lb 7.5 oz (149.9 kg), SpO2 96 %. Pain Scale 1: Numeric (0 - 10)  Pain Intensity 1: 8  Pain Onset 1: post op  Pain Location 1: Abdomen  Pain Orientation 1: Mid  Pain Description 1: Sore, Aching  Pain Intervention(s) 1: Medication (see MAR), Rest, Repositioned  Last bowel movement, if known: stool output in ostomy     Constitutional: awake, alert, oriented, obese built. Eyes: pupils equal, anicteric  ENMT: moist mucous membranes  Cardiovascular: regular rhythm  Respiratory: breathing not labored, clear anteriorly   Gastrointestinal: soft , hypoactive bowel sounds, colostomy bag in place, big midline incision , with dressing . Musculoskeletal: no deformity  Skin: warm, dry, pale  Ext: B/l pitting LE edema  Neurologic: following commands, moving all extremities  Psychiatric: full affect, no hallucinations         HISTORY:     Active Problems:    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)      Past Medical History:   Diagnosis Date    Adverse effect of anesthesia     WOKE DURING SURGERY    Arthritis     Blood clot in vein 2017    STOMACH    Crohn's disease ([address] 8/15/2011    DVT (deep venous thrombosis) ([address] 8/15/2011    LEFT LEG    Edema     GENERALIZED R/T TPN; WAIST DOWN    Incarcerated ventral hernia 8/15/2011    Lupus     Lyme disease     Psychiatric disorder     ANXIETY    Right flank pain 8/22/2011    Seizures ([address] 1990    LAST AT AGE 17    Stroke [facility]) 1990    age 17;  A WEEK POST OP, HAD SEIZURES AND STROKE, WAS IN COMA FOR A MONTH    Thyroid disease     HYPO-NO MEDS      Past Surgical History:   Procedure Laterality Date    HX APPENDECTOMY      HX GYN  2015    HIDRADENTIS LABIA    HX OTHER SURGICAL      multiple procedures related to her Crohn's disease    HX OTHER SURGICAL      ABDOMINAL SURGERY REMOVING COLON, 2/3 STOMACH, 1/3 SMALL INTESTINE    [place] LAP, INCISIONAL HERNIA REPAIR,INCARCERATED  9-10-08    dr. Enrique Aleman      Family History   Problem Relation Age of Onset    Hypertension Father     Stroke Father     Other Father arthritis    Arthritis-osteo Father     Hypertension Mother    Terese Churchill Arthritis-osteo Mother     Anesth Problems Neg Hx       History reviewed, no pertinent family history.   Social History   Substance Use Topics    Smoking status: Former Smoker     Packs/day: 1.00     Years: 16.00     Quit date: 2/27/2017    Smokeless tobacco: Former User      Comment: OFF AND ON    Alcohol use No     Allergies   Allergen Reactions    Sulfa (Sulfonamide Antibiotics) Anaphylaxis    Demerol [Meperidine] Rash    Soma [Carisoprodol] Rash and Nausea Only    Toradol [Ketorolac Tromethamine] Nausea and Vomiting      Current Facility-Administered Medications   Medication Dose Route Frequency    TPN ADULT - CENTRAL   IntraVENous CONTINUOUS    TPN ADULT - CENTRAL   IntraVENous CONTINUOUS    morphine injection 5 mg  5 mg IntraVENous DAILY    morphine CR (MS CONTIN) tablet 115 mg  115 mg Oral QHS    morphine CR (MS CONTIN) tablet 100 mg  100 mg Oral Q24H    morphine CR (MS CONTIN) tablet 100 mg  100 mg Oral Q24H    morphine injection 5 mg  5 mg IntraVENous Q3H PRN    aspirin (ASPIRIN) tablet 325 mg  325 mg Oral DAILY    HYDROmorphone (PF) 25 mg/50 mL (DILAUDID) PCA   IntraVENous CONTINUOUS    0.9% sodium chloride infusion 250 mL  250 mL IntraVENous PRN    acetaminophen (TYLENOL) tablet 650 mg  650 mg Oral Q6H PRN    diphenhydrAMINE (BENADRYL) capsule 25 mg  25 mg Oral Q6H PRN    enoxaparin (LOVENOX) injection 40 mg  40 mg SubCUTAneous Q24H    octreotide (SANDOSTATIN) injection 50 mcg  50 mcg IntraVENous TID    fat emulsion 20% (LIPOSYN, INTRAlipid) infusion 250 mL  250 mL IntraVENous Q MON, WED & FRI    0.9% sodium chloride infusion 250 mL  250 mL IntraVENous PRN    0.9% sodium chloride infusion 250 mL  250 mL IntraVENous PRN    nystatin (MYCOSTATIN) 100,000 unit/gram cream   Topical BID    prochlorperazine (COMPAZINE) with saline injection 5 mg  5 mg IntraVENous Q6H PRN    naloxone (NARCAN) injection 0.4 mg  0.4 mg IntraVENous EVERY 2 MINUTES AS NEEDED    0.9% sodium chloride infusion 250 mL  250 mL IntraVENous PRN    glucose chewable tablet 16 g  4 Tab Oral PRN    dextrose (D50W) injection syrg 12.5-25 g  12.5-25 g IntraVENous PRN    glucagon (GLUCAGEN) injection 1 mg  1 mg IntraMUSCular PRN    insulin lispro (HUMALOG) injection   SubCUTAneous Q6H    scopolamine (TRANSDERM-SCOP) 1 mg over 3 days 1 Patch  1 Patch TransDERmal Q72H    sodium chloride (NS) flush 20 mL  20 mL InterCATHeter PRN    sodium chloride (NS) flush 10 mL  10 mL InterCATHeter Q24H    sodium chloride (NS) flush 10 mL  10 mL InterCATHeter PRN    sodium chloride (NS) flush 10 mL  10 mL InterCATHeter Q8H    alteplase (CATHFLO) 1 mg in sterile water (preservative free) 1 mL injection  1 mg InterCATHeter PRN    bacitracin 500 unit/gram packet 1 Packet  1 Packet Topical PRN    sodium chloride (NS) flush 20 mL  20 mL InterCATHeter PRN    sodium chloride (NS) flush 10 mL  10 mL InterCATHeter Q24H    sodium chloride (NS) flush 10 mL  10 mL InterCATHeter PRN    sodium chloride (NS) flush 10 mL  10 mL InterCATHeter Q8H    dronabinol (MARINOL) capsule 2.5 mg  2.5 mg Oral BID    phenol throat spray (CHLORASEPTIC) 1 Spray  1 Spray Oral PRN    ondansetron (ZOFRAN) injection 4 mg  4 mg IntraVENous Q4H PRN    promethazine (PHENERGAN) 12.5 mg in 0.9% sodium chloride 50 mL IVPB  12.5 mg IntraVENous Q6H PRN    LORazepam (ATIVAN) injection 1 mg  1 mg IntraVENous Q6H PRN    albuterol (PROVENTIL VENTOLIN) nebulizer solution 2.5 mg  2.5 mg Nebulization Q4H PRN          LAB AND IMAGING FINDINGS:     Lab Results   Component Value Date/Time    WBC 8.1 03/30/2018 03:55 AM    HGB 7.3 (L) 03/30/2018 03:55 AM    PLATELET 405 83/89/0257 03:55 AM     Lab Results   Component Value Date/Time    Sodium 139 03/29/2018 04:29 AM    Potassium 4.4 03/29/2018 04:29 AM    Chloride 109 (H) 03/29/2018 04:29 AM    CO2 24 03/29/2018 04:29 AM    BUN 14 03/29/2018 04:29 AM Creatinine 0.58 03/29/2018 04:29 AM    Calcium 8.3 (L) 03/29/2018 04:29 AM    Magnesium 1.7 03/29/2018 04:29 AM    Phosphorus 4.7 03/23/2018 05:36 AM      Lab Results   Component Value Date/Time    AST (SGOT) 10 (L) 03/29/2018 04:29 AM    Alk. phosphatase 95 03/29/2018 04:29 AM    Protein, total 6.7 03/29/2018 04:29 AM    Albumin 1.2 (L) 03/29/2018 04:29 AM    Globulin 5.5 (H) 03/29/2018 04:29 AM     Lab Results   Component Value Date/Time    INR 1.0 12/07/2017 08:37 AM    Prothrombin time 10.0 12/07/2017 08:37 AM      No results found for: IRON, FE, TIBC, IBCT, PSAT, FERR   No results found for: PH, PCO2, PO2  No components found for: GLPOC   No results found for: CPK, CKMB             Total time: 25 mins  Counseling / coordination time, spent as noted above: 20 mins  > 50% counseling / coordination?:     Prolonged service was provided for  []30 min   []75 min in face to face time in the presence of the patient, spent as noted above. Time Start:   Time End:   Note: this can only be billed with 74503 (initial) or [id number] (follow up). If multiple start / stop times, list each separately.

## 2018-03-30 NOTE — PROGRESS NOTES
Problem: Mobility Impaired (Adult and Pediatric)  Goal: *Acute Goals and Plan of Care (Insert Text)  Physical Therapy Goals  Revised 3/27/2018  1. Patient will move from supine to sit and sit to supine  in bed with moderate assistance  within 7 day(s). 2.  Patient will transfer from bed to chair and chair to bed with moderate assistance  using the least restrictive device within 7 day(s). 3.  Patient will perform sit to stand with moderate assistance  within 7 day(s). 4.  Patient will ambulate 100' with moderate assist x1 using the least restrictive device within 7 day(s). Revisited 3/19/2018, goals remain appropriate, carry over    Physical Therapy Goals  Initiated 3/8/2018  1. Patient will move from supine to sit and sit to supine  in bed with moderate assistance  within 7 day(s). 2.  Patient will transfer from bed to chair and chair to bed with moderate assistance  using the least restrictive device within 7 day(s). 3.  Patient will perform sit to stand with moderate assistance  within 7 day(s). 4.  PT will assess gait with the least restrictive device within 7 day(s). physical Therapy TREATMENT  Patient: Laurie Bailey (38 y.o. female)  Date: 3/30/2018  Diagnosis: FISTULA  Small bowel fistula <principal problem not specified>  Procedure(s) (LRB):  EXPLORATORY LAPAROTOMY, LYSIS OF ADHESION X 5HOURS, SMALL BOWEL FISTULA TAKE DOWN X 2 AND WOUND VAC PLACEMENT (N/A) 23 Days Post-Op  Precautions:    Chart, physical therapy assessment, plan of care and goals were reviewed. ASSESSMENT:  Chart reviewed, RN cleared patient for mobility, and patient received in bed with mother present in room. This was third attempt today following defers from wound vac and pain, but patient was still highly motivated to participate with something. Patient sat in full chair position and performed LE ther ex and was educated on IP rehab criteria and expectations.  PT believes this patient is a good candidate as she is motivated, has great support system with her mother, and is below functional baseline but is progressing daily. Patient ended session sitting up with all needs placed within reach and RN notified of patient's status. Progression toward goals:  []    Improving appropriately and progressing toward goals  [x]    Improving slowly and progressing toward goals  []    Not making progress toward goals and plan of care will be adjusted     PLAN:  Patient continues to benefit from skilled intervention to address the above impairments. Continue treatment per established plan of care. Discharge Recommendations:  Inpatient Rehab  Further Equipment Recommendations for Discharge:  TBD     SUBJECTIVE:   Patient stated I want to work with you and walk again today but my stomach hurts so bad. .please dont go yet though can we do something.     OBJECTIVE DATA SUMMARY:   Critical Behavior:  Neurologic State: Alert  Orientation Level: Oriented X4  Cognition: Appropriate for age attention/concentration     Functional Mobility Training:  Bed Mobility:     Supine to Sit:  (utilized bed mechanics)              Transfers:                                   Balance:  Sitting: Impaired  Sitting - Static: Fair (occasional)  Sitting - Dynamic: Fair (occasional)  Ambulation/Gait Training:                                                        Stairs:              Neuro Re-Education:    Therapeutic Exercises: Ankle pumps, LAQ, hip flexion, sitting unsupported x 10 each    Pain:  Pain Scale 1: Numeric (0 - 10)  Pain Intensity 1: 5  Pain Location 1: Abdomen  Pain Orientation 1: Mid;Anterior  Pain Description 1: Sore;Sharp  Pain Intervention(s) 1: Medication (see MAR); Encouraged PCA; Rest  Activity Tolerance:   Fair, HR elevated slightly into 130s with LE ther ex, recovered quickly, much less anxious today, pain limited session  Please refer to the flowsheet for vital signs taken during this treatment.   After treatment:   [x]    Patient left in no apparent distress sitting up in chair  []    Patient left in no apparent distress in bed  [x]    Call bell left within reach  [x]    Nursing notified  []    Caregiver present  []    Bed alarm activated    COMMUNICATION/COLLABORATION:   The patients plan of care was discussed with: Registered Nurse    Denise Tucker PT, DPT   Time Calculation: 25 mins

## 2018-03-30 NOTE — PROGRESS NOTES
Problem: Falls - Risk of  Goal: *Absence of Falls  Document Marlene Fall Risk and appropriate interventions in the flowsheet.    Outcome: Progressing Towards Goal  Fall Risk Interventions:  Mobility Interventions: Patient to call before getting OOB         Medication Interventions: Patient to call before getting OOB, Teach patient to arise slowly    Elimination Interventions: Call light in reach, Patient to call for help with toileting needs    History of Falls Interventions: Door open when patient unattended

## 2018-03-31 LAB
GLUCOSE BLD STRIP.AUTO-MCNC: 128 MG/DL (ref 65–100)
GLUCOSE BLD STRIP.AUTO-MCNC: 144 MG/DL (ref 65–100)
GLUCOSE BLD STRIP.AUTO-MCNC: 163 MG/DL (ref 65–100)
SERVICE CMNT-IMP: ABNORMAL

## 2018-03-31 PROCEDURE — 74011636637 HC RX REV CODE- 636/637: Performed by: PHYSICIAN ASSISTANT

## 2018-03-31 PROCEDURE — 65660000000 HC RM CCU STEPDOWN

## 2018-03-31 PROCEDURE — 74011000250 HC RX REV CODE- 250: Performed by: PHYSICIAN ASSISTANT

## 2018-03-31 PROCEDURE — 74011250637 HC RX REV CODE- 250/637: Performed by: PHYSICIAN ASSISTANT

## 2018-03-31 PROCEDURE — 77030018836 HC SOL IRR NACL ICUM -A

## 2018-03-31 PROCEDURE — 74011000258 HC RX REV CODE- 258: Performed by: PHYSICIAN ASSISTANT

## 2018-03-31 PROCEDURE — 74011636637 HC RX REV CODE- 636/637: Performed by: SURGERY

## 2018-03-31 PROCEDURE — 74011250637 HC RX REV CODE- 250/637: Performed by: NURSE PRACTITIONER

## 2018-03-31 PROCEDURE — 74011250636 HC RX REV CODE- 250/636: Performed by: NURSE PRACTITIONER

## 2018-03-31 PROCEDURE — 3331090001 HH PPS REVENUE CREDIT

## 2018-03-31 PROCEDURE — 74011250637 HC RX REV CODE- 250/637: Performed by: INTERNAL MEDICINE

## 2018-03-31 PROCEDURE — 82962 GLUCOSE BLOOD TEST: CPT

## 2018-03-31 PROCEDURE — 74011250636 HC RX REV CODE- 250/636: Performed by: PHYSICIAN ASSISTANT

## 2018-03-31 PROCEDURE — 74011250636 HC RX REV CODE- 250/636: Performed by: INTERNAL MEDICINE

## 2018-03-31 PROCEDURE — 74011250636 HC RX REV CODE- 250/636: Performed by: SURGERY

## 2018-03-31 PROCEDURE — 3331090002 HH PPS REVENUE DEBIT

## 2018-03-31 RX ADMIN — MORPHINE SULFATE 100 MG: 100 TABLET, FILM COATED, EXTENDED RELEASE ORAL at 15:25

## 2018-03-31 RX ADMIN — MORPHINE SULFATE 5 MG: 4 INJECTION, SOLUTION INTRAMUSCULAR; INTRAVENOUS at 22:23

## 2018-03-31 RX ADMIN — OCTREOTIDE ACETATE 50 MCG: 50 INJECTION, SOLUTION INTRAVENOUS; SUBCUTANEOUS at 21:17

## 2018-03-31 RX ADMIN — OCTREOTIDE ACETATE 50 MCG: 50 INJECTION, SOLUTION INTRAVENOUS; SUBCUTANEOUS at 09:02

## 2018-03-31 RX ADMIN — ONDANSETRON HYDROCHLORIDE 4 MG: 2 INJECTION INTRAMUSCULAR; INTRAVENOUS at 15:25

## 2018-03-31 RX ADMIN — Medication 10 ML: at 15:26

## 2018-03-31 RX ADMIN — MORPHINE SULFATE 5 MG: 4 INJECTION, SOLUTION INTRAMUSCULAR; INTRAVENOUS at 01:16

## 2018-03-31 RX ADMIN — ENOXAPARIN SODIUM 40 MG: 100 INJECTION SUBCUTANEOUS at 11:29

## 2018-03-31 RX ADMIN — MORPHINE SULFATE 5 MG: 4 INJECTION, SOLUTION INTRAMUSCULAR; INTRAVENOUS at 19:19

## 2018-03-31 RX ADMIN — ONDANSETRON HYDROCHLORIDE 4 MG: 2 INJECTION INTRAMUSCULAR; INTRAVENOUS at 09:02

## 2018-03-31 RX ADMIN — MORPHINE SULFATE 100 MG: 100 TABLET, FILM COATED, EXTENDED RELEASE ORAL at 09:02

## 2018-03-31 RX ADMIN — Medication 10 ML: at 09:05

## 2018-03-31 RX ADMIN — MORPHINE SULFATE 5 MG: 4 INJECTION, SOLUTION INTRAMUSCULAR; INTRAVENOUS at 11:29

## 2018-03-31 RX ADMIN — INSULIN LISPRO 3 UNITS: 100 INJECTION, SOLUTION INTRAVENOUS; SUBCUTANEOUS at 12:07

## 2018-03-31 RX ADMIN — MORPHINE SULFATE 115 MG: 15 TABLET, EXTENDED RELEASE ORAL at 22:23

## 2018-03-31 RX ADMIN — INSULIN LISPRO 3 UNITS: 100 INJECTION, SOLUTION INTRAVENOUS; SUBCUTANEOUS at 01:16

## 2018-03-31 RX ADMIN — DRONABINOL 2.5 MG: 2.5 CAPSULE ORAL at 18:58

## 2018-03-31 RX ADMIN — ASCORBIC ACID: 500 INJECTION, SOLUTION INTRAMUSCULAR; INTRAVENOUS; SUBCUTANEOUS at 18:59

## 2018-03-31 RX ADMIN — OCTREOTIDE ACETATE 50 MCG: 50 INJECTION, SOLUTION INTRAVENOUS; SUBCUTANEOUS at 15:25

## 2018-03-31 RX ADMIN — ASPIRIN 325 MG: 325 TABLET ORAL at 09:01

## 2018-03-31 RX ADMIN — Medication 10 ML: at 09:06

## 2018-03-31 RX ADMIN — DRONABINOL 2.5 MG: 2.5 CAPSULE ORAL at 09:01

## 2018-03-31 RX ADMIN — Medication: at 12:07

## 2018-03-31 RX ADMIN — INSULIN LISPRO 3 UNITS: 100 INJECTION, SOLUTION INTRAVENOUS; SUBCUTANEOUS at 18:58

## 2018-03-31 RX ADMIN — PROCHLORPERAZINE EDISYLATE 5 MG: 5 INJECTION INTRAMUSCULAR; INTRAVENOUS at 21:17

## 2018-03-31 NOTE — PROGRESS NOTES
Bedside and Verbal shift change report given to april (oncoming nurse) by Kb Morris (offgoing nurse). Report included the following information SBAR, Kardex, Intake/Output, Recent Results and Cardiac Rhythm sinus tach.

## 2018-03-31 NOTE — PROGRESS NOTES
03/31/18 0746   Vitals   Temp 98.6 °F (37 °C)   Temp Source Oral   Pulse (Heart Rate) (!) 121   Heart Rate Source Monitor   Resp Rate 18   O2 Sat (%) 99 %   Level of Consciousness Alert   BP 97/52   MAP (Calculated) 67   BP 1 Location Left arm   BP 1 Method Automatic   BP Patient Position At rest   MEWS Score 4   Pt is baseline tachy, asymptomatic. Will continue to monitor.

## 2018-03-31 NOTE — PROGRESS NOTES
03/31/18 1519   Vitals   Temp 98.7 °F (37.1 °C)   Temp Source Oral   Pulse (Heart Rate) (!) 107   Heart Rate Source Monitor   Resp Rate 17   O2 Sat (%) 100 %   Level of Consciousness Alert   BP 91/65   MAP (Calculated) 74   BP 1 Location Left arm   BP 1 Method Automatic   BP Patient Position At rest   Cardiac Rhythm Sinus Tach   MEWS Score 3   Pt is baseline tachy, asymptomatic. Will continue to monitor.

## 2018-03-31 NOTE — PROGRESS NOTES
Progress Note    Patient: Ruth Shaw MRN: [id number]  SSN: [id number]    YOB: 1975  Age: 38 y.o. Sex: female      Admit Date: 3/7/2018    24 Days Post-Op    Procedure:  Procedure(s):  EXPLORATORY LAPAROTOMY, LYSIS OF ADHESION X 5HOURS, SMALL BOWEL FISTULA TAKE DOWN X 2 AND WOUND VAC PLACEMENT    Subjective:     Her only c/o this AM is that she is not sleeping well, no acute issues overnight, mild pain lower incision, waiting for dressing change for today, he is tolerating sips of clears for comfort, she is getting OOB daily but has a lot of anxiety about being OOB since she fell once prior to admission, she is usually up to chair for a while after wound care is completed,  Ileostomy is minimally productive.     Objective:     Visit Vitals    BP 97/52 (BP 1 Location: Left arm, BP Patient Position: At rest)    Pulse (!) 121    Temp 98.6 °F (37 °C)    Resp 18    Ht 5' 4\" (1.626 m)    Wt (!) 357 lb 2.3 oz (162 kg)    SpO2 99%    BMI 61.3 kg/m2       Temp (24hrs), Av.8 °F (37.1 °C), Min:98.3 °F (36.8 °C), Max:99.2 °F (37.3 °C)        Physical Exam:    Gen:  NAD  Pulm:  Unlabored  Abd:  Soft, ND, NT, large clean surgical dressing to midline, stoma left side with some cloudy yellow output, wound drains hooked to wall suction draining some bilious appearing output   Midline wound:  Large open wound midline fistula with bilious output    Recent Results (from the past 24 hour(s))   GLUCOSE, POC    Collection Time: 18 11:21 AM   Result Value Ref Range    Glucose (POC) 150 (H) 65 - 100 mg/dL    Performed by Bhumika Elder    GLUCOSE, POC    Collection Time: 18  5:28 PM   Result Value Ref Range    Glucose (POC) 135 (H) 65 - 100 mg/dL    Performed by Griselda Hammans, POC    Collection Time: 18 11:39 PM   Result Value Ref Range    Glucose (POC) 155 (H) 65 - 100 mg/dL    Performed by Jacey Dinh, POC    Collection Time: 18  6:00 AM   Result Value Ref Range    Glucose (POC) 128 (H) 65 - 100 mg/dL    Performed by Heath Hooks          Assessment:     Hospital Problems  Date Reviewed: 10/27/2017          Codes Class Noted POA    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 Wilkins Fees By: АНДРЕЙ Butt     March 31, 2018

## 2018-04-01 LAB
ANION GAP SERPL CALC-SCNC: 8 MMOL/L (ref 5–15)
BUN SERPL-MCNC: 15 MG/DL (ref 6–20)
BUN/CREAT SERPL: 25 (ref 12–20)
CALCIUM SERPL-MCNC: 8.5 MG/DL (ref 8.5–10.1)
CHLORIDE SERPL-SCNC: 108 MMOL/L (ref 97–108)
CO2 SERPL-SCNC: 23 MMOL/L (ref 21–32)
CREAT SERPL-MCNC: 0.59 MG/DL (ref 0.55–1.02)
GLUCOSE BLD STRIP.AUTO-MCNC: 140 MG/DL (ref 65–100)
GLUCOSE BLD STRIP.AUTO-MCNC: 144 MG/DL (ref 65–100)
GLUCOSE BLD STRIP.AUTO-MCNC: 150 MG/DL (ref 65–100)
GLUCOSE BLD STRIP.AUTO-MCNC: 175 MG/DL (ref 65–100)
GLUCOSE SERPL-MCNC: 139 MG/DL (ref 65–100)
PHOSPHATE SERPL-MCNC: 5.1 MG/DL (ref 2.6–4.7)
POTASSIUM SERPL-SCNC: 4.7 MMOL/L (ref 3.5–5.1)
SERVICE CMNT-IMP: ABNORMAL
SODIUM SERPL-SCNC: 139 MMOL/L (ref 136–145)

## 2018-04-01 PROCEDURE — 74011000258 HC RX REV CODE- 258: Performed by: SURGERY

## 2018-04-01 PROCEDURE — 74011000250 HC RX REV CODE- 250: Performed by: SURGERY

## 2018-04-01 PROCEDURE — 74011636637 HC RX REV CODE- 636/637: Performed by: SURGERY

## 2018-04-01 PROCEDURE — 74011250637 HC RX REV CODE- 250/637: Performed by: NURSE PRACTITIONER

## 2018-04-01 PROCEDURE — 74011250637 HC RX REV CODE- 250/637: Performed by: PHYSICIAN ASSISTANT

## 2018-04-01 PROCEDURE — 74011250637 HC RX REV CODE- 250/637: Performed by: INTERNAL MEDICINE

## 2018-04-01 PROCEDURE — 74011250636 HC RX REV CODE- 250/636: Performed by: NURSE PRACTITIONER

## 2018-04-01 PROCEDURE — 74011250636 HC RX REV CODE- 250/636: Performed by: INTERNAL MEDICINE

## 2018-04-01 PROCEDURE — 3331090001 HH PPS REVENUE CREDIT

## 2018-04-01 PROCEDURE — 3331090002 HH PPS REVENUE DEBIT

## 2018-04-01 PROCEDURE — 80048 BASIC METABOLIC PNL TOTAL CA: CPT | Performed by: SURGERY

## 2018-04-01 PROCEDURE — 74011250636 HC RX REV CODE- 250/636: Performed by: SURGERY

## 2018-04-01 PROCEDURE — 84100 ASSAY OF PHOSPHORUS: CPT | Performed by: SURGERY

## 2018-04-01 PROCEDURE — 82962 GLUCOSE BLOOD TEST: CPT

## 2018-04-01 PROCEDURE — 36415 COLL VENOUS BLD VENIPUNCTURE: CPT | Performed by: SURGERY

## 2018-04-01 PROCEDURE — 65660000000 HC RM CCU STEPDOWN

## 2018-04-01 RX ADMIN — Medication 10 ML: at 11:17

## 2018-04-01 RX ADMIN — MORPHINE SULFATE 5 MG: 4 INJECTION, SOLUTION INTRAMUSCULAR; INTRAVENOUS at 19:15

## 2018-04-01 RX ADMIN — Medication: at 16:59

## 2018-04-01 RX ADMIN — Medication 10 ML: at 21:59

## 2018-04-01 RX ADMIN — OCTREOTIDE ACETATE 50 MCG: 50 INJECTION, SOLUTION INTRAVENOUS; SUBCUTANEOUS at 21:55

## 2018-04-01 RX ADMIN — DRONABINOL 2.5 MG: 2.5 CAPSULE ORAL at 19:14

## 2018-04-01 RX ADMIN — INSULIN LISPRO 3 UNITS: 100 INJECTION, SOLUTION INTRAVENOUS; SUBCUTANEOUS at 12:31

## 2018-04-01 RX ADMIN — Medication 10 ML: at 21:58

## 2018-04-01 RX ADMIN — OCTREOTIDE ACETATE 50 MCG: 50 INJECTION, SOLUTION INTRAVENOUS; SUBCUTANEOUS at 09:02

## 2018-04-01 RX ADMIN — OCTREOTIDE ACETATE 50 MCG: 50 INJECTION, SOLUTION INTRAVENOUS; SUBCUTANEOUS at 16:58

## 2018-04-01 RX ADMIN — INSULIN LISPRO 3 UNITS: 100 INJECTION, SOLUTION INTRAVENOUS; SUBCUTANEOUS at 19:14

## 2018-04-01 RX ADMIN — Medication 10 ML: at 22:00

## 2018-04-01 RX ADMIN — ASCORBIC ACID: 500 INJECTION, SOLUTION INTRAMUSCULAR; INTRAVENOUS; SUBCUTANEOUS at 19:20

## 2018-04-01 RX ADMIN — ENOXAPARIN SODIUM 40 MG: 100 INJECTION SUBCUTANEOUS at 11:15

## 2018-04-01 RX ADMIN — MORPHINE SULFATE 100 MG: 100 TABLET, FILM COATED, EXTENDED RELEASE ORAL at 09:02

## 2018-04-01 RX ADMIN — Medication: at 02:25

## 2018-04-01 RX ADMIN — ONDANSETRON HYDROCHLORIDE 4 MG: 2 INJECTION INTRAMUSCULAR; INTRAVENOUS at 21:58

## 2018-04-01 RX ADMIN — MORPHINE SULFATE 5 MG: 4 INJECTION, SOLUTION INTRAMUSCULAR; INTRAVENOUS at 11:15

## 2018-04-01 RX ADMIN — MORPHINE SULFATE 115 MG: 15 TABLET, EXTENDED RELEASE ORAL at 23:10

## 2018-04-01 RX ADMIN — MORPHINE SULFATE 100 MG: 100 TABLET, FILM COATED, EXTENDED RELEASE ORAL at 16:58

## 2018-04-01 RX ADMIN — ONDANSETRON HYDROCHLORIDE 4 MG: 2 INJECTION INTRAMUSCULAR; INTRAVENOUS at 09:02

## 2018-04-01 RX ADMIN — ASPIRIN 325 MG: 325 TABLET ORAL at 09:01

## 2018-04-01 RX ADMIN — ONDANSETRON HYDROCHLORIDE 4 MG: 2 INJECTION INTRAMUSCULAR; INTRAVENOUS at 16:58

## 2018-04-01 RX ADMIN — DRONABINOL 2.5 MG: 2.5 CAPSULE ORAL at 09:01

## 2018-04-01 RX ADMIN — INSULIN LISPRO 3 UNITS: 100 INJECTION, SOLUTION INTRAVENOUS; SUBCUTANEOUS at 00:25

## 2018-04-01 NOTE — PROGRESS NOTES
Bedside shift change report given to Amador RN (oncoming nurse) by April, RN (offgoing nurse). Report included the following information SBAR, Intake/Output, MAR, Accordion and Cardiac Rhythm Sinus tach.

## 2018-04-01 NOTE — PROGRESS NOTES
Progress Note     Patient: Gray Buchanan MRN: [id number]  SSN: [id number]    YOB: 1975  Age: 38 y.o. Sex: female       Admit Date: 3/7/2018     25 Days Post-Op     Procedure:  Procedure(s):  EXPLORATORY LAPAROTOMY, LYSIS OF ADHESION X 5HOURS, SMALL BOWEL FISTULA TAKE DOWN X 2 AND WOUND VAC PLACEMENT     Subjective:      Her dressing was just recently changed. She has some pain involving the skin around the lower portion of her wound where she has some enteric drainage. Otherwise she is without any new comlpaints. She is tolerating sips of clears for comfort. Ileostomy with some output.     Objective:         Visit Vitals    BP 92/67 (BP 1 Location: Left arm, BP Patient Position: At rest)    Pulse (!) 115    Temp 98.7 °F (37.1 °C)    Resp 17    Ht 5' 4\" (1.626 m)    Wt (!) 357 lb 9.4 oz (162.2 kg)    SpO2 97%    BMI 61.38 kg/m2             Physical Exam:    Gen:  NAD, Alert, communicating appropriately.     Pulm:  Unlabored  Abd:  Soft, ND, NT, large clean surgical dressing to midline, stoma left side with some cloudy yellow output, wound drains hooked to wall suction draining some bilious appearing output   Midline wound:  Large open wound midline fistula with bilious output     Recent Results (from the past 24 hour(s))   GLUCOSE, POC    Collection Time: 03/31/18  5:31 PM   Result Value Ref Range    Glucose (POC) 144 (H) 65 - 100 mg/dL    Performed by Mikala Garcia    GLUCOSE, POC    Collection Time: 04/01/18 12:09 AM   Result Value Ref Range    Glucose (POC) 150 (H) 65 - 100 mg/dL    Performed by Deja King    PHOSPHORUS    Collection Time: 04/01/18  5:25 AM   Result Value Ref Range    Phosphorus 5.1 (H) 2.6 - 4.7 MG/DL   METABOLIC PANEL, BASIC    Collection Time: 04/01/18  5:25 AM   Result Value Ref Range    Sodium 139 136 - 145 mmol/L    Potassium 4.7 3.5 - 5.1 mmol/L    Chloride 108 97 - 108 mmol/L    CO2 23 21 - 32 mmol/L    Anion gap 8 5 - 15 mmol/L    Glucose 139 (H) 65 - 100 mg/dL BUN 15 6 - 20 MG/DL    Creatinine 0.59 0.55 - 1.02 MG/DL    BUN/Creatinine ratio 25 (H) 12 - 20      GFR est AA >60 >60 ml/min/1.73m2    GFR est non-AA >60 >60 ml/min/1.73m2    Calcium 8.5 8.5 - 10.1 MG/DL   GLUCOSE, POC    Collection Time: 04/01/18  6:05 AM   Result Value Ref Range    Glucose (POC) 140 (H) 65 - 100 mg/dL    Performed by Carita Scheuermann    GLUCOSE, POC    Collection Time: 04/01/18 11:29 AM   Result Value Ref Range    Glucose (POC) 175 (H) 65 - 100 mg/dL    Performed by Bakari Bowden April              Assessment:   [facility] Problems  Date Reviewed: 10/27/2017             Codes Class Noted POA     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 - Local wound care. Plan for wound vac placement this week. Getting barrier ointment to skin. May need to consider duoderm to protect skin if vac unsuccessful tomorrow.     Rufus Head for comfort  - OOB daily   - AM labs  - DVT proph    Jorge Barahona MD  4/1/2018  2:32 PM

## 2018-04-01 NOTE — PROGRESS NOTES
Bedside and Verbal shift change report given to april (oncoming nurse) by Monica Villafana (offgoing nurse). Report included the following information SBAR, Kardex, Intake/Output, Recent Results and Cardiac Rhythm sinus tach.

## 2018-04-02 LAB
ANION GAP SERPL CALC-SCNC: 6 MMOL/L (ref 5–15)
BASOPHILS # BLD: 0 K/UL (ref 0–0.1)
BASOPHILS NFR BLD: 0 % (ref 0–1)
BUN SERPL-MCNC: 15 MG/DL (ref 6–20)
BUN/CREAT SERPL: 27 (ref 12–20)
CALCIUM SERPL-MCNC: 9 MG/DL (ref 8.5–10.1)
CHLORIDE SERPL-SCNC: 107 MMOL/L (ref 97–108)
CO2 SERPL-SCNC: 24 MMOL/L (ref 21–32)
CREAT SERPL-MCNC: 0.56 MG/DL (ref 0.55–1.02)
DIFFERENTIAL METHOD BLD: ABNORMAL
EOSINOPHIL # BLD: 0.5 K/UL (ref 0–0.4)
EOSINOPHIL NFR BLD: 5 % (ref 0–7)
ERYTHROCYTE [DISTWIDTH] IN BLOOD BY AUTOMATED COUNT: 18.9 % (ref 11.5–14.5)
GLUCOSE BLD STRIP.AUTO-MCNC: 126 MG/DL (ref 65–100)
GLUCOSE BLD STRIP.AUTO-MCNC: 151 MG/DL (ref 65–100)
GLUCOSE BLD STRIP.AUTO-MCNC: 154 MG/DL (ref 65–100)
GLUCOSE BLD STRIP.AUTO-MCNC: 169 MG/DL (ref 65–100)
GLUCOSE BLD STRIP.AUTO-MCNC: 183 MG/DL (ref 65–100)
GLUCOSE SERPL-MCNC: 110 MG/DL (ref 65–100)
HCT VFR BLD AUTO: 24 % (ref 35–47)
HGB BLD-MCNC: 7.4 G/DL (ref 11.5–16)
IMM GRANULOCYTES # BLD: 0 K/UL
IMM GRANULOCYTES NFR BLD AUTO: 0 %
LYMPHOCYTES # BLD: 3.8 K/UL (ref 0.8–3.5)
LYMPHOCYTES NFR BLD: 35 % (ref 12–49)
MCH RBC QN AUTO: 29.8 PG (ref 26–34)
MCHC RBC AUTO-ENTMCNC: 30.8 G/DL (ref 30–36.5)
MCV RBC AUTO: 96.8 FL (ref 80–99)
MONOCYTES # BLD: 1.3 K/UL (ref 0–1)
MONOCYTES NFR BLD: 12 % (ref 5–13)
MYELOCYTES NFR BLD MANUAL: 1 %
NEUTS BAND NFR BLD MANUAL: 4 % (ref 0–6)
NEUTS SEG # BLD: 5.1 K/UL (ref 1.8–8)
NEUTS SEG NFR BLD: 43 % (ref 32–75)
NRBC # BLD: 0.1 K/UL (ref 0–0.01)
NRBC BLD-RTO: 0.9 PER 100 WBC
PLATELET # BLD AUTO: 416 K/UL (ref 150–400)
PLATELET COMMENTS,PCOM: ABNORMAL
PMV BLD AUTO: 9.8 FL (ref 8.9–12.9)
POTASSIUM SERPL-SCNC: 4.5 MMOL/L (ref 3.5–5.1)
RBC # BLD AUTO: 2.48 M/UL (ref 3.8–5.2)
RBC MORPH BLD: ABNORMAL
SERVICE CMNT-IMP: ABNORMAL
SODIUM SERPL-SCNC: 137 MMOL/L (ref 136–145)
WBC # BLD AUTO: 10.8 K/UL (ref 3.6–11)

## 2018-04-02 PROCEDURE — 74011250637 HC RX REV CODE- 250/637: Performed by: INTERNAL MEDICINE

## 2018-04-02 PROCEDURE — 3331090001 HH PPS REVENUE CREDIT

## 2018-04-02 PROCEDURE — 74011250637 HC RX REV CODE- 250/637: Performed by: NURSE PRACTITIONER

## 2018-04-02 PROCEDURE — 3331090002 HH PPS REVENUE DEBIT

## 2018-04-02 PROCEDURE — 82962 GLUCOSE BLOOD TEST: CPT

## 2018-04-02 PROCEDURE — 97606 NEG PRS WND THER DME>50 SQCM: CPT

## 2018-04-02 PROCEDURE — 74011000258 HC RX REV CODE- 258: Performed by: SURGERY

## 2018-04-02 PROCEDURE — 65270000029 HC RM PRIVATE

## 2018-04-02 PROCEDURE — 74011250636 HC RX REV CODE- 250/636: Performed by: SURGERY

## 2018-04-02 PROCEDURE — 80048 BASIC METABOLIC PNL TOTAL CA: CPT | Performed by: PHYSICIAN ASSISTANT

## 2018-04-02 PROCEDURE — 74011636637 HC RX REV CODE- 636/637: Performed by: SURGERY

## 2018-04-02 PROCEDURE — 74011250637 HC RX REV CODE- 250/637: Performed by: PHYSICIAN ASSISTANT

## 2018-04-02 PROCEDURE — 77030018836 HC SOL IRR NACL ICUM -A

## 2018-04-02 PROCEDURE — 36415 COLL VENOUS BLD VENIPUNCTURE: CPT | Performed by: PHYSICIAN ASSISTANT

## 2018-04-02 PROCEDURE — 74011000250 HC RX REV CODE- 250: Performed by: SURGERY

## 2018-04-02 PROCEDURE — 74011250636 HC RX REV CODE- 250/636: Performed by: INTERNAL MEDICINE

## 2018-04-02 PROCEDURE — 74011250636 HC RX REV CODE- 250/636: Performed by: NURSE PRACTITIONER

## 2018-04-02 PROCEDURE — 85025 COMPLETE CBC W/AUTO DIFF WBC: CPT | Performed by: SURGERY

## 2018-04-02 RX ORDER — FUROSEMIDE 10 MG/ML
20 INJECTION INTRAMUSCULAR; INTRAVENOUS DAILY
Status: DISCONTINUED | OUTPATIENT
Start: 2018-04-02 | End: 2018-04-05

## 2018-04-02 RX ADMIN — MORPHINE SULFATE 100 MG: 100 TABLET, FILM COATED, EXTENDED RELEASE ORAL at 17:09

## 2018-04-02 RX ADMIN — Medication 10 ML: at 10:00

## 2018-04-02 RX ADMIN — Medication: at 04:03

## 2018-04-02 RX ADMIN — Medication 10 ML: at 22:00

## 2018-04-02 RX ADMIN — I.V. FAT EMULSION 250 ML: 20 EMULSION INTRAVENOUS at 20:27

## 2018-04-02 RX ADMIN — MORPHINE SULFATE 115 MG: 15 TABLET, EXTENDED RELEASE ORAL at 23:47

## 2018-04-02 RX ADMIN — Medication 10 ML: at 15:07

## 2018-04-02 RX ADMIN — Medication 10 ML: at 06:00

## 2018-04-02 RX ADMIN — FUROSEMIDE 20 MG: 10 INJECTION, SOLUTION INTRAMUSCULAR; INTRAVENOUS at 15:06

## 2018-04-02 RX ADMIN — DRONABINOL 2.5 MG: 2.5 CAPSULE ORAL at 09:38

## 2018-04-02 RX ADMIN — Medication: at 22:23

## 2018-04-02 RX ADMIN — INSULIN LISPRO 3 UNITS: 100 INJECTION, SOLUTION INTRAVENOUS; SUBCUTANEOUS at 12:00

## 2018-04-02 RX ADMIN — Medication 10 ML: at 23:49

## 2018-04-02 RX ADMIN — INSULIN LISPRO 3 UNITS: 100 INJECTION, SOLUTION INTRAVENOUS; SUBCUTANEOUS at 17:57

## 2018-04-02 RX ADMIN — MORPHINE SULFATE 5 MG: 4 INJECTION, SOLUTION INTRAMUSCULAR; INTRAVENOUS at 19:08

## 2018-04-02 RX ADMIN — MORPHINE SULFATE 100 MG: 100 TABLET, FILM COATED, EXTENDED RELEASE ORAL at 09:38

## 2018-04-02 RX ADMIN — ENOXAPARIN SODIUM 40 MG: 100 INJECTION SUBCUTANEOUS at 09:38

## 2018-04-02 RX ADMIN — NYSTATIN: 100000 CREAM TOPICAL at 09:00

## 2018-04-02 RX ADMIN — MORPHINE SULFATE 5 MG: 4 INJECTION, SOLUTION INTRAMUSCULAR; INTRAVENOUS at 04:54

## 2018-04-02 RX ADMIN — ASPIRIN 325 MG: 325 TABLET ORAL at 09:38

## 2018-04-02 RX ADMIN — Medication: at 08:05

## 2018-04-02 RX ADMIN — PROCHLORPERAZINE EDISYLATE 5 MG: 5 INJECTION INTRAMUSCULAR; INTRAVENOUS at 21:33

## 2018-04-02 RX ADMIN — DRONABINOL 2.5 MG: 2.5 CAPSULE ORAL at 17:09

## 2018-04-02 RX ADMIN — ASCORBIC ACID: 500 INJECTION, SOLUTION INTRAMUSCULAR; INTRAVENOUS; SUBCUTANEOUS at 20:27

## 2018-04-02 NOTE — PROGRESS NOTES
Physical Therapy Note     Second attempt. Discussed with RN. Patient's wound vac currently being tending to. PT will follow up tomorrow per POC. Recommend patient transfer to chair with bariatric RW and RN assist later today if able.      Kylee Byrd PT, DPT

## 2018-04-02 NOTE — PROGRESS NOTES
Physical Therapy Note     Chart reviewed and discussed with RN. Patient currently tired, reporting she did not sleep well at all and requesting to wait until the afternoon for therapy. PT will follow up as able and appropriate.     Katrin Reynoso PT, DPT

## 2018-04-02 NOTE — ROUTINE PROCESS
Bedside shift change report given to Darline Enciso (oncoming nurse) by Kye Alberts  (offgoing nurse). Report included the following information SBAR, Intake/Output, Recent Results and Med Rec Status.

## 2018-04-02 NOTE — PROGRESS NOTES
NUTRITION       Recommendations:  1. Agree with cyclic goal as ordered as well as increase in AA  -- Check BG 2 hours into infusion and one hour after infusion is completed for accurate BG trends    2. Recheck phosphorus and adjust in TPN as needed (5.1 with labs on 4/1)    2. Continue daily weights and obtain standing scale weight whenever possible (ie; when pt up with PT, moves to the chair, etc)    Brief TPN note. Chart reviewed. Pt remains on TPN as sole source of nutrition. Palliative Care is also following closely. Surgery note today with plans to stop octreotide drip and adjust TPN to cyclic regimen. AA concentration also increased to 6%. WOCN team continues to follow closely as well to optimize healing. VAC ordered/placed. No standing scale weight to assess since 3/29. Current bed weight shows a 38# difference from the standing scale. Current TPN: 5%AA D20 @ 100 mL/hr + MVI, 20 units insulin/L, famotidine 40 mg, thiamine (100 mg), ascorbic acid 500 mg + 20% lipids, 250 mL 3x/week   -- This provides a daily average of 2326 kcal, 120 g protein in 2400 mL (2650 mL on lipid days)-- meeting 100% of estimated needs. Future TPN:   6%AA D20 @ 90 mL/hr x 1 hour   @ 185 mL/hr x 12 hours   @ 90 mL/hr x last hour  + MVI, thiamine (100 mg), trace elements (3x/week), zinc sulfate 10 mg (3x/wk), ascorbic acid 500 mg, 20 units insulin/L  + 20% lipids, 250 mL 3x/week  -- This will provide a daily average of goal to provide a daily average of 2422 kcal, 144 g protein and 2400 mL    Estimated Nutrition Needs:   Kcals/day: 2881 Kcals/day (0884-8601 kcal/day (11-15 kcal/kg))  Protein: 120 g (120-150 kcal/day (0.8-1 g/kg))  Fluid: 2250 ml (or 1 mL/kcal or per output)     Based On: Kcal/kg - specify (Comment)  Weight Used: Actual wt (149.9 kg)    RD to follow.     [address]

## 2018-04-02 NOTE — PROGRESS NOTES
Palliative Medicine Consult  Joe: [phone]    Patient Name: Laurie Bailey  YOB: 1975    Date of Initial Consult: 3/12/18  Reason for Consult: Pain management, chronic and post op   Requesting Provider: Rajni   Primary Care Physician: Jackie Asencio MD     SUMMARY:   Laurie Bailey is a 38 y.o. with a past history of Crohn's disease (followed by Dr Jacqueline Shaw), mult surgeries s/p total colectomy w/ end ileostomy, 6/2017 ex lap with small bowel perforation, 6/28/17 ex lap, LPA, repair or enterotomy, SMA stent on L, 7/16/2917 ex lap, KATHRINE, fistula exclusion w/ feeding tube placement and prolonged TPN who was admitted on 3/7/2018 from home for planned surgery, s/p ex lap w/ extensive KATHRINE and small bowel fistula take down, wound vac placement. 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 Known to our team during past hospitalizations. Has had lengthy hospital stays w/ mult complications. Has required [facility] stays in past. Has chronic pain from Crohn's disease (and has not been able to tolerate home Crohn's meds recently due to acute issues- had been on steroids, stopped prior to surgery). Discussion of Remicaide in future. Current medical issues leading to Palliative Medicine involvement include: pain management. Patient's mother, Kylie Castillo is NOK.  reviewed, no abberrancies- one prescriber for opioids and one pharmacy. Most recently filled 3/9/18: MSContin 100mg tid and Morphine IR 30mg- 8 tabs a day. Has been tried on equivalent dose of Fentanyl patch w/out same benefit. Discussed Methadone but would have to check w/ her prescriber. PALLIATIVE DIAGNOSES:     1. Chronic abdominal pain from Cronh's disease and hx of surgeries  2. Nausea  3. Anxiety   4. Feeding difficulties due to medical condition- has been on TPN for several months  5. Edema   6. L ankle pain s/p fall, normal XRs- improving   7.  Grief/depressive sx 8. Constipation   9. Opioid tolerance      PLAN:     Abd pain from surgical incision and wound care as well as underlying chronic pain from Crohn's which is worse when off her steroids. Has high opioid tolerance, no aberrancies on . We have done opioid rotation in the hospital for her in past. Have discussed w/ provider who follows for her abdominal pain, Dr Jenkins Lias this stay. Mom Yosef Montana states that she has one month supply of medications at home. Pain :  1. Last week changed to long acting MSContin- cont 100mg bid and 115mg bedtime. Have had previous concerns about absorption but seems to be doing okay. 2. Continue Dilaudid PCA no basal, 0.6mg IV every 6 min demand. 3. Tmrw to discuss conversion to oral short acting medications Morphine IR 30mg every 3h prn pain and continuing the IV breakthrough medication, hortencia for wound care. 4. Bowel regimen per surgery. 5. Following to assist w/ transition to po and for emotional support as pt needs.      Communicated plan of care with: Palliative IDT; Adam Mckeon wound care     GOALS OF CARE / TREATMENT PREFERENCES:     GOALS OF CARE:  Patient/Health Care Proxy Stated Goals:  (get pain better, treatment of acute issues.)      TREATMENT PREFERENCES:   Code Status: Full Code    Advance Care Planning:  Advance Care Planning 3/15/2018   Patient's Healthcare Decision Maker is: Named in scanned ACP document   Primary Decision Maker Name Frances Biggs   Primary Decision Maker Phone Number [phone]   Primary Decision Maker Relationship to Patient Parent   Secondary Decision Maker Name Telly Nath Phone Number [phone]   Secondary Decision Maker Relationship to Patient Unknown   Confirm Advance Directive Yes, on file   Patient Would Like to Complete Advance Directive -   Does the patient have other document types -       Medical Interventions: Full interventions           Other:    As far as possible, the palliative care team has discussed with patient / health care proxy about goals of care / treatment preferences for patient. HISTORY:         HPI/SUBJECTIVE:    The patient is:   [x] Verbal and participatory  [] Non-participatory due to:     Pt did okay over weekend, currently getting wound care done- tolerating unpacking of wound w/out premedication of morphine IV. Clinical Pain Assessment (nonverbal scale for severity on nonverbal patients):   Clinical Pain Assessment  Severity: 8  Location: all over abdomen   Character: stabbing  Duration: chronic , acute since surgery  Effect: incraesed today after sutures were removed.   Factors: increase with daily care  Frequency: constant          Duration: for how long has pt been experiencing pain (e.g., 2 days, 1 month, years)  Frequency: how often pain is an issue (e.g., several times per day, once every few days, constant)     FUNCTIONAL ASSESSMENT:     Palliative Performance Scale (PPS):  PPS: 50       PSYCHOSOCIAL/SPIRITUAL SCREENING:     Palliative IDT has assessed this patient for cultural preferences / practices and a referral made as appropriate to needs (Cultural Services, Patient Advocacy, Ethics, etc.)    Advance Care Planning:  Advance Care Planning 3/15/2018   Patient's Healthcare Decision Maker is: Named in scanned ACP document   Primary Decision Maker Name Darren Estes   Primary Decision Maker Phone Number [phone]   Primary Decision Maker Relationship to Patient Parent   Secondary Decision Maker Name Telly Nath Phone Number [phone]   Secondary Decision Maker Relationship to Patient Unknown   Confirm Advance Directive Yes, on file   Patient Would Like to Complete Advance Directive -   Does the patient have other document types -       Any spiritual / Mu-ism concerns:  [] Yes /  [x] No    Caregiver Burnout:  [] Yes /  [x] No /  [] No Caregiver Present      Anticipatory grief assessment:   [x] Normal  / [] Maladaptive ESAS Anxiety: Anxiety: 0    ESAS Depression: Depression: 0        REVIEW OF SYSTEMS:     Positive and pertinent negative findings in ROS are noted above in HPI. The following systems were [x] reviewed / [] unable to be reviewed as noted in HPI  Other findings are noted below. Systems: constitutional, ears/nose/mouth/throat, respiratory, gastrointestinal, genitourinary, musculoskeletal, integumentary, neurologic, psychiatric, endocrine. Positive findings noted below. Modified ESAS Completed by: provider   Fatigue: 5 Drowsiness: 0   Depression: 0 Pain: 8   Anxiety: 0 Nausea: 2   Anorexia: 3 Dyspnea: 0     Constipation: No              PHYSICAL EXAM:     From RN flowsheet:  Wt Readings from Last 3 Encounters:   04/02/18 (!) 368 lb 2.7 oz (167 kg)   03/05/18 304 lb 3.2 oz (138 kg)   02/28/18 306 lb (138.8 kg)     Blood pressure 94/53, pulse (!) 109, temperature 98.3 °F (36.8 °C), resp. rate 19, height 5' 4\" (1.626 m), weight (!) 368 lb 2.7 oz (167 kg), SpO2 95 %.     Pain Scale 1: Numeric (0 - 10)  Pain Intensity 1: 7  Pain Onset 1: post op  Pain Location 1: Abdomen  Pain Orientation 1: Anterior  Pain Description 1: Sharp, Stabbing  Pain Intervention(s) 1: Encouraged PCA  Last bowel movement, if known: stool output in ostomy     Constitutional: awake, alert, oriented  Eyes: pupils equal, anicteri  ENMT: moist mucous membranes  Respiratory: breathing not labored,  Gastrointestinal: soft, colostomy bag in place, big midline incision clean  Musculoskeletal: no deformity  Skin: warm, dry, pale  Ext: B/l pitting LE edema  Neurologic: following commands, moving all extremities  Psychiatric: full affect, no hallucinations         HISTORY:     Active Problems:    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)      Past Medical History:   Diagnosis Date    Adverse effect of anesthesia     WOKE DURING SURGERY    Arthritis     Blood clot in vein 2017    STOMACH    Crohn's disease ([address] 8/15/2011    DVT (deep venous thrombosis) ([facility] [address] 8/15/2011    LEFT LEG    Edema     GENERALIZED R/T TPN; WAIST DOWN    Incarcerated ventral hernia 8/15/2011    Lupus     Lyme disease     Psychiatric disorder     ANXIETY    Right flank pain 8/22/2011    Seizures ([address] 1990    LAST AT AGE 17    Stroke [facility]) 1990    age 17; A WEEK POST OP, HAD SEIZURES AND STROKE, WAS IN COMA FOR A MONTH    Thyroid disease     HYPO-NO MEDS      Past Surgical History:   Procedure Laterality Date    HX APPENDECTOMY      HX GYN  2015    HIDRADENTIS LABIA    HX OTHER SURGICAL      multiple procedures related to her Crohn's disease    HX OTHER SURGICAL      ABDOMINAL SURGERY REMOVING COLON, 2/3 STOMACH, 1/3 SMALL INTESTINE    [place] LAP, INCISIONAL HERNIA REPAIR,INCARCERATED  9-10-08    dr. Yariel Bansal      Family History   Problem Relation Age of Onset    Hypertension Father     Stroke Father     Other Father      arthritis    Arthritis-osteo Father     Hypertension Mother     Arthritis-osteo Mother     Anesth Problems Neg Hx       History reviewed, no pertinent family history.   Social History   Substance Use Topics    Smoking status: Former Smoker     Packs/day: 1.00     Years: 16.00     Quit date: 2/27/2017    Smokeless tobacco: Former User      Comment: OFF AND ON    Alcohol use No     Allergies   Allergen Reactions    Sulfa (Sulfonamide Antibiotics) Anaphylaxis    Demerol [Meperidine] Rash    Soma [Carisoprodol] Rash and Nausea Only    Toradol [Ketorolac Tromethamine] Nausea and Vomiting      Current Facility-Administered Medications   Medication Dose Route Frequency    TPN ADULT - CENTRAL   IntraVENous TITRATE    furosemide (LASIX) injection 20 mg  20 mg IntraVENous DAILY    TPN ADULT - CENTRAL   IntraVENous CONTINUOUS    morphine injection 5 mg  5 mg IntraVENous DAILY    morphine CR (MS CONTIN) tablet 115 mg  115 mg Oral QHS    morphine CR (MS CONTIN) tablet 100 mg  100 mg Oral Q24H    morphine CR (MS CONTIN) tablet 100 mg  100 mg Oral Q24H    morphine injection 5 mg  5 mg IntraVENous Q3H PRN    aspirin (ASPIRIN) tablet 325 mg  325 mg Oral DAILY    HYDROmorphone (PF) 25 mg/50 mL (DILAUDID) PCA   IntraVENous CONTINUOUS    0.9% sodium chloride infusion 250 mL  250 mL IntraVENous PRN    acetaminophen (TYLENOL) tablet 650 mg  650 mg Oral Q6H PRN    diphenhydrAMINE (BENADRYL) capsule 25 mg  25 mg Oral Q6H PRN    enoxaparin (LOVENOX) injection 40 mg  40 mg SubCUTAneous Q24H    fat emulsion 20% (LIPOSYN, INTRAlipid) infusion 250 mL  250 mL IntraVENous Q MON, WED & FRI    nystatin (MYCOSTATIN) 100,000 unit/gram cream   Topical BID    prochlorperazine (COMPAZINE) with saline injection 5 mg  5 mg IntraVENous Q6H PRN    naloxone (NARCAN) injection 0.4 mg  0.4 mg IntraVENous EVERY 2 MINUTES AS NEEDED    glucose chewable tablet 16 g  4 Tab Oral PRN    dextrose (D50W) injection syrg 12.5-25 g  12.5-25 g IntraVENous PRN    glucagon (GLUCAGEN) injection 1 mg  1 mg IntraMUSCular PRN    insulin lispro (HUMALOG) injection   SubCUTAneous Q6H    scopolamine (TRANSDERM-SCOP) 1 mg over 3 days 1 Patch  1 Patch TransDERmal Q72H    sodium chloride (NS) flush 10 mL  10 mL InterCATHeter Q24H    sodium chloride (NS) flush 10 mL  10 mL InterCATHeter Q8H    alteplase (CATHFLO) 1 mg in sterile water (preservative free) 1 mL injection  1 mg InterCATHeter PRN    bacitracin 500 unit/gram packet 1 Packet  1 Packet Topical PRN    sodium chloride (NS) flush 20 mL  20 mL InterCATHeter PRN    sodium chloride (NS) flush 10 mL  10 mL InterCATHeter Q24H    sodium chloride (NS) flush 10 mL  10 mL InterCATHeter PRN    sodium chloride (NS) flush 10 mL  10 mL InterCATHeter Q8H    dronabinol (MARINOL) capsule 2.5 mg  2.5 mg Oral BID    phenol throat spray (CHLORASEPTIC) 1 Spray  1 Spray Oral PRN    ondansetron (ZOFRAN) injection 4 mg  4 mg IntraVENous Q4H PRN    promethazine (PHENERGAN) 12.5 mg in 0.9% sodium chloride 50 mL IVPB  12.5 mg IntraVENous Q6H PRN    LORazepam (ATIVAN) injection 1 mg  1 mg IntraVENous Q6H PRN    albuterol (PROVENTIL VENTOLIN) nebulizer solution 2.5 mg  2.5 mg Nebulization Q4H PRN          LAB AND IMAGING FINDINGS:     Lab Results   Component Value Date/Time    WBC 10.8 04/02/2018 04:47 AM    HGB 7.4 (L) 04/02/2018 04:47 AM    PLATELET 992 (H) 21/61/4397 04:47 AM     Lab Results   Component Value Date/Time    Sodium 137 04/02/2018 04:47 AM    Potassium 4.5 04/02/2018 04:47 AM    Chloride 107 04/02/2018 04:47 AM    CO2 24 04/02/2018 04:47 AM    BUN 15 04/02/2018 04:47 AM    Creatinine 0.56 04/02/2018 04:47 AM    Calcium 9.0 04/02/2018 04:47 AM    Magnesium 1.7 03/29/2018 04:29 AM    Phosphorus 5.1 (H) 04/01/2018 05:25 AM      Lab Results   Component Value Date/Time    AST (SGOT) 10 (L) 03/29/2018 04:29 AM    Alk. phosphatase 95 03/29/2018 04:29 AM    Protein, total 6.7 03/29/2018 04:29 AM    Albumin 1.2 (L) 03/29/2018 04:29 AM    Globulin 5.5 (H) 03/29/2018 04:29 AM     Lab Results   Component Value Date/Time    INR 1.0 12/07/2017 08:37 AM    Prothrombin time 10.0 12/07/2017 08:37 AM      No results found for: IRON, FE, TIBC, IBCT, PSAT, FERR   No results found for: PH, PCO2, PO2  No components found for: GLPOC   No results found for: CPK, CKMB             Total time:   Counseling / coordination time, spent as noted above:   > 50% counseling / coordination?:     Prolonged service was provided for  []30 min   []75 min in face to face time in the presence of the patient, spent as noted above. Time Start:   Time End:   Note: this can only be billed with 36624 (initial) or 63541 (follow up). If multiple start / stop times, list each separately.

## 2018-04-02 NOTE — PROGRESS NOTES
TRANSFER - OUT REPORT:    Verbal report given to Hannah De Leon RN (name) on [facility]  being transferred to [facility] ([facility]) for routine progression of care       Report consisted of patients Situation, Background, Assessment and   Recommendations(SBAR). Information from the following report(s) SBAR, Kardex, Procedure Summary, Intake/Output, MAR and Recent Results was reviewed with the receiving nurse. Lines:   PICC Double Lumen 08/02/17 Right (Active)   Central Line Being Utilized Yes 4/2/2018  9:00 AM   Criteria for Appropriate Use Limited/no vessel suitable for conventional peripheral access 4/2/2018  9:00 AM   Site Assessment Clean, dry, & intact 4/2/2018  9:00 AM   Phlebitis Assessment 0 4/2/2018  9:00 AM   Infiltration Assessment 0 4/2/2018  9:00 AM   Date of Last Dressing Change 03/26/18 4/2/2018  9:00 AM   Dressing Status Clean, dry, & intact 4/2/2018  9:00 AM   Action Taken Open ports on tubing capped 4/2/2018  9:00 AM   External Catheter Length (cm) 4 centimeters 3/26/2018  1:06 PM   Dressing Type Disk with Chlorhexadine gluconate (CHG) 4/2/2018  9:00 AM   Hub Color/Line Status White; Infusing 4/2/2018  9:00 AM   Positive Blood Return (Site #1) Yes 4/2/2018  9:00 AM   Hub Color/Line Status Purple; Infusing 4/2/2018  9:00 AM   Positive Blood Return (Site #2) Yes 4/2/2018  9:00 AM   Alcohol Cap Used Yes 4/2/2018  9:00 AM        Opportunity for questions and clarification was provided.

## 2018-04-02 NOTE — PROGRESS NOTES
Ms. Bessie Gonzalez is tired this AM. No other complaints. Tm 100.4 Tc 98.4 HR: 116 BP: 93/53 Resp Rate: 18 94% sat on room air. Intake/Output Summary (Last 24 hours) at 04/02/18 0743  Last data filed at 04/02/18 0401   Gross per 24 hour   Intake          4220.01 ml   Output             2400 ml   Net          1820.01 ml   Exam: Cor: RRR. Lungs: Bilateral breath sounds. Clear to auscultation. Abd: Soft. Tender. Dressing dry. There is ileostomy output. Labs:   Recent Results (from the past 12 hour(s))   GLUCOSE, POC    Collection Time: 04/01/18 11:57 PM   Result Value Ref Range    Glucose (POC) 151 (H) 65 - 100 mg/dL    Performed by [address] [place] [address], [facility]    Collection Time: 04/02/18  4:47 AM   Result Value Ref Range    Sodium 137 136 - 145 mmol/L    Potassium 4.5 3.5 - 5.1 mmol/L    Chloride 107 97 - 108 mmol/L    CO2 24 21 - 32 mmol/L    Anion gap 6 5 - 15 mmol/L    Glucose 110 (H) 65 - 100 mg/dL    BUN 15 6 - 20 MG/DL    Creatinine 0.56 0.55 - 1.02 MG/DL    BUN/Creatinine ratio 27 (H) 12 - 20      GFR est AA >60 >60 ml/min/1.73m2    GFR est non-AA >60 >60 ml/min/1.73m2    Calcium 9.0 8.5 - 10.1 MG/DL   CBC WITH AUTOMATED DIFF    Collection Time: 04/02/18  4:47 AM   Result Value Ref Range    WBC 10.8 3.6 - 11.0 K/uL    RBC 2.48 (L) 3.80 - 5.20 M/uL    HGB 7.4 (L) 11.5 - 16.0 g/dL    HCT 24.0 (L) 35.0 - 47.0 %    MCV 96.8 80.0 - 99.0 FL    MCH 29.8 26.0 - 34.0 PG    MCHC 30.8 30.0 - 36.5 g/dL    RDW 18.9 (H) 11.5 - 14.5 %    PLATELET 712 (H) 451 - 400 K/uL    MPV 9.8 8.9 - 12.9 FL    NRBC 0.9 (H) 0  WBC    ABSOLUTE NRBC 0.10 (H) 0.00 - 0.01 K/uL    NEUTROPHILS 43 32 - 75 %    BAND NEUTROPHILS 4 0 - 6 %    LYMPHOCYTES 35 12 - 49 %    MONOCYTES 12 5 - 13 %    EOSINOPHILS 5 0 - 7 %    BASOPHILS 0 0 - 1 %    MYELOCYTES 1 (H) 0 %    IMMATURE GRANULOCYTES 0 %    ABS. NEUTROPHILS 5.1 1.8 - 8.0 K/UL    ABS. LYMPHOCYTES 3.8 (H) 0.8 - 3.5 K/UL    ABS.  MONOCYTES 1.3 (H) 0.0 - 1.0 K/UL    ABS. EOSINOPHILS 0.5 (H) 0.0 - 0.4 K/UL    ABS. BASOPHILS 0.0 0.0 - 0.1 K/UL    ABS. IMM. GRANS. 0.0 K/UL    DF MANUAL      PLATELET COMMENTS CLUMPED PLATELETS      RBC COMMENTS ANISOCYTOSIS  1+        RBC COMMENTS BASOPHILIC STIPPLING  PRESENT        RBC COMMENTS POLYCHROMASIA  1+        RBC COMMENTS MICROCYTOSIS  PRESENT       GLUCOSE, POC    Collection Time: 04/02/18  5:36 AM   Result Value Ref Range    Glucose (POC) 126 (H) 65 - 100 mg/dL    Performed by Ashly ZELAYA    Continue sips of clear liquids. Will continue TPN and Lipids and change to cyclic. Palliative Care following. Will stop Octreotide. Physical Therapy following. Continue local wound care. [facility] to [facility].

## 2018-04-02 NOTE — PROGRESS NOTES
Bedside shift change report given to Lawrence Luciano RN (oncoming nurse) by MAURI Aleman (offgoing nurse). Report included the following information SBAR, Kardex, Intake/Output, MAR, Accordion and Cardiac Rhythm Sinus tach.

## 2018-04-02 NOTE — PROGRESS NOTES
Problem: Falls - Risk of  Goal: *Absence of Falls  Document Marlene Fall Risk and appropriate interventions in the flowsheet.    Fall Risk Interventions:  Mobility Interventions: Patient to call before getting OOB         Medication Interventions: Patient to call before getting OOB    Elimination Interventions: Call light in reach    History of Falls Interventions: Door open when patient unattended

## 2018-04-02 NOTE — WOUND CARE
WOCN Note:     Follow-up visit for abdominal wound. Seen today with АНДРЕЙ Pollard.     Chart shows:  Admitted for fistula takdown 3/7/18 by Dr. Gadiel Jackson with [facility] placement in [address] [place] [address]; history of multiple abdominal surgeries and Crohns disease. Admitted from home. Assessment:   Patient is A&O x 4, continent and mobile - moves around room with assistance. Bed: total care bariatric  Pain managed during wound care today with PCA usage only - she has required additional IV pain med in the past.     1. Midline abdominal surgical wound = 20 x 15 x 4.5 cm (smaller)  90% pink granular moist wound base and 5% scattered devitalized stained areas of green/yellow and 5% stomatized mucus membrane in two areas close to each other centrally with peristalsis. Loose sutures noted distally. Dark green in wall cannister that is connected via 2 red rubber caths resting in central and distal wound bed removed today for VAC dressing placement. 1 piece of black sponge with central hole to allow for pouching of the fistula/bowel x2; 2 Hawk's rings placed around hole top and bottom to achieve seal, paste added. After seal achieved, a convex pouch was placed to collect fistula output. 50 mmHg continuous suction. This system may not be successful due to the os of the stomatized fistula being flush with wound bed @ 3 o'clock. An alternative may be to protect the fistula well and VAC the whole area rather than pouching. The secretions seem thin enough to possibly not clog the VAC. Goal is to find a system for use at home until wound is small enough to pouch.       Colostomy: stoma is red & moist; Small amount of mucoid tan output; 2-piece flat pouch with good seal. Supplies left in room. Wound Recommendations:    Maintain VAC as ordered @ 50 mmHg - will likely change this three times per week. Discussed above plan with patient & RN.     Transition of Care: Plan to follow as needed while admitted to hospital with LTAC, located within [facility] dressing change on Wednesday.      Nelia Spurling, BSN, RN, [facility]  Certified Wound, Ostomy, Continence Nurse  office [phone]  pager [phone] or call  to page

## 2018-04-03 LAB
GLUCOSE BLD STRIP.AUTO-MCNC: 108 MG/DL (ref 65–100)
GLUCOSE BLD STRIP.AUTO-MCNC: 136 MG/DL (ref 65–100)
GLUCOSE BLD STRIP.AUTO-MCNC: 161 MG/DL (ref 65–100)
GLUCOSE BLD STRIP.AUTO-MCNC: 205 MG/DL (ref 65–100)
SERVICE CMNT-IMP: ABNORMAL

## 2018-04-03 PROCEDURE — 74011250637 HC RX REV CODE- 250/637: Performed by: INTERNAL MEDICINE

## 2018-04-03 PROCEDURE — 74011000250 HC RX REV CODE- 250: Performed by: SURGERY

## 2018-04-03 PROCEDURE — 74011250636 HC RX REV CODE- 250/636: Performed by: SURGERY

## 2018-04-03 PROCEDURE — 74011636637 HC RX REV CODE- 636/637: Performed by: SURGERY

## 2018-04-03 PROCEDURE — 74011250637 HC RX REV CODE- 250/637: Performed by: PHYSICAL MEDICINE & REHABILITATION

## 2018-04-03 PROCEDURE — 97530 THERAPEUTIC ACTIVITIES: CPT

## 2018-04-03 PROCEDURE — 82962 GLUCOSE BLOOD TEST: CPT

## 2018-04-03 PROCEDURE — 74011250637 HC RX REV CODE- 250/637: Performed by: NURSE PRACTITIONER

## 2018-04-03 PROCEDURE — 74011250636 HC RX REV CODE- 250/636: Performed by: INTERNAL MEDICINE

## 2018-04-03 PROCEDURE — 74011250636 HC RX REV CODE- 250/636: Performed by: NURSE PRACTITIONER

## 2018-04-03 PROCEDURE — 74011250637 HC RX REV CODE- 250/637: Performed by: PHYSICIAN ASSISTANT

## 2018-04-03 PROCEDURE — 74011250636 HC RX REV CODE- 250/636: Performed by: PHYSICAL MEDICINE & REHABILITATION

## 2018-04-03 PROCEDURE — 3331090001 HH PPS REVENUE CREDIT

## 2018-04-03 PROCEDURE — 65270000029 HC RM PRIVATE

## 2018-04-03 PROCEDURE — 3331090002 HH PPS REVENUE DEBIT

## 2018-04-03 PROCEDURE — 74011000258 HC RX REV CODE- 258: Performed by: SURGERY

## 2018-04-03 RX ORDER — MORPHINE SULFATE 4 MG/ML
5 INJECTION, SOLUTION INTRAMUSCULAR; INTRAVENOUS
Status: DISCONTINUED | OUTPATIENT
Start: 2018-04-03 | End: 2018-04-17 | Stop reason: SDUPTHER

## 2018-04-03 RX ORDER — MORPHINE SULFATE 2 MG/ML
5 INJECTION, SOLUTION INTRAMUSCULAR; INTRAVENOUS DAILY
Status: DISCONTINUED | OUTPATIENT
Start: 2018-04-03 | End: 2018-04-05

## 2018-04-03 RX ORDER — MORPHINE SULFATE 15 MG/1
30 TABLET ORAL
Status: DISCONTINUED | OUTPATIENT
Start: 2018-04-03 | End: 2018-04-04

## 2018-04-03 RX ADMIN — MORPHINE SULFATE 100 MG: 100 TABLET, FILM COATED, EXTENDED RELEASE ORAL at 15:17

## 2018-04-03 RX ADMIN — FUROSEMIDE 20 MG: 10 INJECTION, SOLUTION INTRAMUSCULAR; INTRAVENOUS at 09:32

## 2018-04-03 RX ADMIN — INSULIN LISPRO 4 UNITS: 100 INJECTION, SOLUTION INTRAVENOUS; SUBCUTANEOUS at 07:10

## 2018-04-03 RX ADMIN — MORPHINE SULFATE 5 MG: 4 INJECTION, SOLUTION INTRAMUSCULAR; INTRAVENOUS at 16:47

## 2018-04-03 RX ADMIN — Medication 10 ML: at 14:00

## 2018-04-03 RX ADMIN — Medication 10 ML: at 21:50

## 2018-04-03 RX ADMIN — PROCHLORPERAZINE EDISYLATE 5 MG: 5 INJECTION INTRAMUSCULAR; INTRAVENOUS at 23:37

## 2018-04-03 RX ADMIN — MORPHINE SULFATE 30 MG: 15 TABLET ORAL at 17:40

## 2018-04-03 RX ADMIN — Medication 10 ML: at 12:39

## 2018-04-03 RX ADMIN — Medication 10 ML: at 19:15

## 2018-04-03 RX ADMIN — ENOXAPARIN SODIUM 40 MG: 100 INJECTION SUBCUTANEOUS at 09:30

## 2018-04-03 RX ADMIN — PROCHLORPERAZINE EDISYLATE 5 MG: 5 INJECTION INTRAMUSCULAR; INTRAVENOUS at 03:27

## 2018-04-03 RX ADMIN — MORPHINE SULFATE 5 MG: 4 INJECTION, SOLUTION INTRAMUSCULAR; INTRAVENOUS at 19:33

## 2018-04-03 RX ADMIN — MORPHINE SULFATE 5 MG: 2 INJECTION, SOLUTION INTRAMUSCULAR; INTRAVENOUS at 12:38

## 2018-04-03 RX ADMIN — DRONABINOL 2.5 MG: 2.5 CAPSULE ORAL at 17:41

## 2018-04-03 RX ADMIN — MORPHINE SULFATE 30 MG: 15 TABLET ORAL at 21:50

## 2018-04-03 RX ADMIN — Medication 10 ML: at 03:27

## 2018-04-03 RX ADMIN — DRONABINOL 2.5 MG: 2.5 CAPSULE ORAL at 09:31

## 2018-04-03 RX ADMIN — MORPHINE SULFATE 100 MG: 100 TABLET, FILM COATED, EXTENDED RELEASE ORAL at 07:11

## 2018-04-03 RX ADMIN — ASCORBIC ACID: 500 INJECTION, SOLUTION INTRAMUSCULAR; INTRAVENOUS; SUBCUTANEOUS at 19:07

## 2018-04-03 RX ADMIN — MORPHINE SULFATE 30 MG: 15 TABLET ORAL at 15:17

## 2018-04-03 RX ADMIN — Medication 10 ML: at 07:19

## 2018-04-03 NOTE — PROGRESS NOTES
No specific complaints today. Tm 99.3 HR: 120 BP: 109/73 Resp Rate: 18 97% sat on room air. Intake/Output Summary (Last 24 hours) at 04/03/18 0741  Last data filed at 04/03/18 0710   Gross per 24 hour   Intake                0 ml   Output             3950 ml   Net            -3950 ml   Exam: Cor: RRR. Lungs: Bilateral breath sounds. Clear to auscultation. Abd: Soft. Non distended. Wound vac in place with serosanguinous fluid in canister. Fistula well controlled. Labs:   Recent Results (from the past 12 hour(s))   GLUCOSE, POC    Collection Time: 04/02/18 11:14 PM   Result Value Ref Range    Glucose (POC) 183 (H) 65 - 100 mg/dL    Performed by Andrew Crain (PCT)    GLUCOSE, POC    Collection Time: 04/03/18  6:28 AM   Result Value Ref Range    Glucose (POC) 205 (H) 65 - 100 mg/dL    Performed by Andrew Crain (PCT)    Clear liquid diet as tolerated. Will renew cyclic TPN and Lipids. Palliative Care following. Labs in AM.   Continue PT. DVT Prophylaxis - Lovenox. Gentle diuresis.

## 2018-04-03 NOTE — PROGRESS NOTES
Note patient transfer to [address] [place] [address]. Referral sent to [facility] 3/30. Patient seen by PT today, OT has not yet been consulted, OT orders written today. CM s/w [facility] liaison Kwaku Reyes [phone], Kwaku Reyes confirms that [facility] is following case and working to determine if [facility] can meet patient needs. Note that patient tolerating clears, uncertain of long-term nutrition plan. [facility] will continue to follow for medical needs and PT/OT input. Will require insurance authorization for rehab placement. CM to follow.     ORBERT Prakash

## 2018-04-03 NOTE — PROGRESS NOTES
Bedside shift change report given to Janette Hawkins (oncoming nurse) by Lazaro Bosworth (offgoing nurse). Report included the following information SBAR.

## 2018-04-03 NOTE — PROGRESS NOTES
No output from ostomy overnight, fistula and drain remained intact. Pain fairly controlled on PCA. Bedside shift change report given to Nii (oncoming nurse) by Lennox Park RN (offgoing nurse). Report included the following information SBAR, Kardex, Procedure Summary, Intake/Output, MAR, Accordion and Recent Results.

## 2018-04-03 NOTE — PROGRESS NOTES
Primary Nurse Chely Apodaca and Joisane Parham, RN performed a dual skin assessment on this patient Impairment noted- see wound doc flow sheet - Abdominal wound and then stretch marks that become edematous and loose fragile skin once not edematous.   Amador score is 17

## 2018-04-03 NOTE — PROGRESS NOTES
Bedside and Verbal shift change report given to Mabel Lake (oncoming nurse) by Audelia Lopez and Lakhwinder Bell RN (offgoing nurse). Report included the following information SBAR, Kardex, Intake/Output, MAR and Recent Results.

## 2018-04-03 NOTE — PROGRESS NOTES
Bedside shift change report given to Philip Massey (oncoming nurse) by Damaris Leone (offgoing nurse). Report included the following information SBAR.

## 2018-04-03 NOTE — WOUND CARE
See earlier this morning. VAC with a good seal and collecting light serosanguinous exudate in cannister - NO green/bilious noted. Pouch with a good seal at this time. She is proficient in emptying it and understands it is helpful to keep it empty to maintain the seal.  Fistula output is green. I am hopeful this system will last for 48 hours - planning on full change tomorrow with Dr. Santos Beaver with possible placement of feeding tube via fistula.    Margoth Barker, CWOCN

## 2018-04-03 NOTE — PROGRESS NOTES
Palliative Medicine Consult  [place]: [phone] ([phone])    Patient Name: Maria De Jesus Hensley  YOB: 1975    Date of Initial Consult: 3/12/18  Reason for Consult: Pain management, chronic and post op   Requesting Provider: Rajni   Primary Care Physician: Anibal Bailey MD     SUMMARY:   Maria De Jesus Hensley is a 38 y.o. with a past history of Crohn's disease (followed by Dr Carl Zuniga), mult surgeries s/p total colectomy w/ end ileostomy, 6/2017 ex lap with small bowel perforation, 6/28/17 ex lap, LPA, repair or enterotomy, SMA stent on L, 7/16/2917 ex lap, KATHRINE, fistula exclusion w/ feeding tube placement and prolonged TPN who was admitted on 3/7/2018 from home for planned surgery, s/p ex lap w/ extensive KATHRINE and small bowel fistula take down, wound vac placement. 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 Known to our team during past hospitalizations. Has had lengthy hospital stays w/ mult complications. Has required [facility] stays in past. Has chronic pain from Crohn's disease (and has not been able to tolerate home Crohn's meds recently due to acute issues- had been on steroids, stopped prior to surgery). Discussion of Remicaide in future. Current medical issues leading to Palliative Medicine involvement include: pain management. Patient's mother, Vikki Melchor is NOK.  reviewed, no abberrancies- one prescriber for opioids and one pharmacy. Most recently filled 3/9/18: MSContin 100mg tid and Morphine IR 30mg- 8 tabs a day. Has been tried on equivalent dose of Fentanyl patch w/out same benefit. Discussed Methadone but would have to check w/ her prescriber. PALLIATIVE DIAGNOSES:     1. Chronic abdominal pain from Cronh's disease and hx of surgeries  2. Nausea  3. Anxiety   4. Feeding difficulties due to medical condition- has been on TPN for several months, now tolerating clear tray  5. Edema   6. Grief/depressive sx   7. Constipation   8.  Opioid tolerance      PLAN:     Abd pain from surgical incision and wound care as well as underlying chronic pain from Crohn's which is worse when off her steroids. Has high opioid tolerance, no aberrancies on . We have done opioid rotation in the hospital for her in past. Have discussed w/ provider who follows for her abdominal pain, Dr Concha Moritz this stay. Mom Bill Laureano states that she has one month supply of medications at home. Pain :    1. Convert to all po opioids today. Pt amenable. 2. MSContin- cont 100mg bid and 115mg bedtime. 3. Stop PCA. Start Morphine 30mg po every 3h. Schedule while in hospital, but pt may refuse or hold if sedated. This is significantly less than she was getting via PCA (see below for calculations) but think pt will do okay, and has IV back up. 4. Cont back up IV Morphine 5mg- change to every 2h prn. Would wait approx 30 min in between and IV and PO dose. .     5. Following to assist w/ transition to po and for emotional support as pt needs. Communicated plan of care with: Palliative IDT;  Halima Tracy RN; Sarah RONQUILLO; benjamin Noriega Irvin OF CARE / TREATMENT PREFERENCES:     GOALS OF CARE:  Patient/Health Care Proxy Stated Goals:  (get pain better, treatment of acute issues.)      TREATMENT PREFERENCES:   Code Status: Full Code    Advance Care Planning:  Advance Care Planning 3/15/2018   Patient's Healthcare Decision Maker is: Named in scanned ACP document   Primary Decision Maker Name Ben Clifford   Primary Decision Maker Phone Number [phone]   Primary Decision Maker Relationship to Patient Parent   Secondary Decision Maker Name Telly Nath Phone Number [phone]   Secondary Decision Maker Relationship to Patient Unknown   Confirm Advance Directive Yes, on file   Patient Would Like to Complete Advance Directive -   Does the patient have other document types -       Medical Interventions: Full interventions           Other:    As far as possible, the palliative care team has discussed with patient / health care proxy about goals of care / treatment preferences for patient. HISTORY:         HPI/SUBJECTIVE:    The patient is:   [x] Verbal and participatory  [] Non-participatory due to:     Pt doing okay, wound vac in place. Pain tolerable. She is amenable to switching to all oral pain regimen. Dilaudid PCA, 0.6mg IV every 6m in demand- used 15mg in past 8h ~45mg IV Dilaudid in 24h ~225po Dilaudid ~>1000mg oral morphine. Clinical Pain Assessment (nonverbal scale for severity on nonverbal patients):   Clinical Pain Assessment  Severity: 7  Location: all over abdomen   Character: stabbing  Duration: chronic , acute since surgery  Effect: incraesed today after sutures were removed.   Factors: increase with daily care  Frequency: constant          Duration: for how long has pt been experiencing pain (e.g., 2 days, 1 month, years)  Frequency: how often pain is an issue (e.g., several times per day, once every few days, constant)     FUNCTIONAL ASSESSMENT:     Palliative Performance Scale (PPS):  PPS: 50       PSYCHOSOCIAL/SPIRITUAL SCREENING:     Palliative IDT has assessed this patient for cultural preferences / practices and a referral made as appropriate to needs (Cultural Services, Patient Advocacy, Ethics, etc.)    Advance Care Planning:  Advance Care Planning 3/15/2018   Patient's Healthcare Decision Maker is: Named in scanned ACP document   Primary Decision Maker Name Dino Pete   Primary Decision Maker Phone Number [phone]   Primary Decision Maker Relationship to Patient Parent   Secondary Decision Maker Name Telly Nath Phone Number [phone]   Secondary Decision Maker Relationship to Patient Unknown   Confirm Advance Directive Yes, on file   Patient Would Like to Complete Advance Directive -   Does the patient have other document types -       Any spiritual / Voodoo concerns:  [] Yes /  [x] No    Caregiver Burnout:  [] Yes /  [x] No /  [] No Caregiver Present      Anticipatory grief assessment:   [x] Normal  / [] Maladaptive       ESAS Anxiety: Anxiety: 0    ESAS Depression: Depression: 0        REVIEW OF SYSTEMS:     Positive and pertinent negative findings in ROS are noted above in HPI. The following systems were [x] reviewed / [] unable to be reviewed as noted in HPI  Other findings are noted below. Systems: constitutional, ears/nose/mouth/throat, respiratory, gastrointestinal, genitourinary, musculoskeletal, integumentary, neurologic, psychiatric, endocrine. Positive findings noted below. Modified ESAS Completed by: provider   Fatigue: 4 Drowsiness: 0   Depression: 0 Pain: 7   Anxiety: 0 Nausea: 2   Anorexia: 3 Dyspnea: 0     Constipation: No              PHYSICAL EXAM:     From RN flowsheet:  Wt Readings from Last 3 Encounters:   04/03/18 (!) 369 lb 7.9 oz (167.6 kg)   03/05/18 304 lb 3.2 oz (138 kg)   02/28/18 306 lb (138.8 kg)     Blood pressure 100/65, pulse (!) 116, temperature 98.8 °F (37.1 °C), resp. rate 16, height 5' 4\" (1.626 m), weight (!) 369 lb 7.9 oz (167.6 kg), SpO2 95 %.     Pain Scale 1: Numeric (0 - 10)  Pain Intensity 1: 9  Pain Onset 1: post op  Pain Location 1: Abdomen, Ankle  Pain Orientation 1: Anterior  Pain Description 1: Aching  Pain Intervention(s) 1: Medication (see MAR)  Last bowel movement, if known: stool output in ostomy     Constitutional: awake, alert, oriented, a bit tearful today  Eyes: pupils equal, anicteri  ENMT: moist mucous membranes  Respiratory: breathing not labored,  Gastrointestinal: soft, colostomy bag in place, big midline incision w/ wound vac  Musculoskeletal: no deformity  Skin: warm, dry, pale  Ext: B/l pitting LE edema  Neurologic: following commands, moving all extremities  Psychiatric: full affect, no hallucinations         HISTORY:     Active Problems:    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)      Past Medical History:   Diagnosis Date    Adverse effect of anesthesia     WOKE DURING SURGERY    Arthritis     Blood clot in vein 2017    STOMACH    Crohn's disease ([facility] [address] 8/15/2011    DVT (deep venous thrombosis) ([facility] [address] 8/15/2011    LEFT LEG    Edema     GENERALIZED R/T TPN; WAIST DOWN    Incarcerated ventral hernia 8/15/2011    Lupus     Lyme disease     Psychiatric disorder     ANXIETY    Right flank pain 8/22/2011    Seizures ([facility] [address] 1990    LAST AT AGE 17    Stroke [facility]) 1990    age 17; A WEEK POST OP, HAD SEIZURES AND STROKE, WAS IN COMA FOR A MONTH    Thyroid disease     HYPO-NO MEDS      Past Surgical History:   Procedure Laterality Date    HX APPENDECTOMY      HX GYN  2015    HIDRADENTIS LABIA    HX OTHER SURGICAL      multiple procedures related to her Crohn's disease    HX OTHER SURGICAL      ABDOMINAL SURGERY REMOVING COLON, 2/3 STOMACH, 1/3 SMALL INTESTINE    FL LAP, INCISIONAL HERNIA REPAIR,INCARCERATED  9-10-08    dr. Sunday Santiago      Family History   Problem Relation Age of Onset    Hypertension Father     Stroke Father     Other Father      arthritis    Arthritis-osteo Father     Hypertension Mother     Arthritis-osteo Mother     Anesth Problems Neg Hx       History reviewed, no pertinent family history.   Social History   Substance Use Topics    Smoking status: Former Smoker     Packs/day: 1.00     Years: 16.00     Quit date: 2/27/2017    Smokeless tobacco: Former User      Comment: OFF AND ON    Alcohol use No     Allergies   Allergen Reactions    Sulfa (Sulfonamide Antibiotics) Anaphylaxis    Demerol [Meperidine] Rash    Soma [Carisoprodol] Rash and Nausea Only    Toradol [Ketorolac Tromethamine] Nausea and Vomiting      Current Facility-Administered Medications   Medication Dose Route Frequency    TPN ADULT - CENTRAL   IntraVENous TITRATE    morphine injection 5 mg  5 mg IntraVENous DAILY    morphine IR (MS IR) tablet 30 mg  30 mg Oral Q3H    morphine injection 5 mg  5 mg IntraVENous Q2H PRN    furosemide (LASIX) injection 20 mg  20 mg IntraVENous DAILY    morphine CR (MS CONTIN) tablet 115 mg  115 mg Oral QHS    morphine CR (MS CONTIN) tablet 100 mg  100 mg Oral Q24H    morphine CR (MS CONTIN) tablet 100 mg  100 mg Oral Q24H    aspirin (ASPIRIN) tablet 325 mg  325 mg Oral DAILY    0.9% sodium chloride infusion 250 mL  250 mL IntraVENous PRN    acetaminophen (TYLENOL) tablet 650 mg  650 mg Oral Q6H PRN    diphenhydrAMINE (BENADRYL) capsule 25 mg  25 mg Oral Q6H PRN    enoxaparin (LOVENOX) injection 40 mg  40 mg SubCUTAneous Q24H    fat emulsion 20% (LIPOSYN, INTRAlipid) infusion 250 mL  250 mL IntraVENous Q MON, WED & FRI    nystatin (MYCOSTATIN) 100,000 unit/gram cream   Topical BID    prochlorperazine (COMPAZINE) with saline injection 5 mg  5 mg IntraVENous Q6H PRN    naloxone (NARCAN) injection 0.4 mg  0.4 mg IntraVENous EVERY 2 MINUTES AS NEEDED    glucose chewable tablet 16 g  4 Tab Oral PRN    dextrose (D50W) injection syrg 12.5-25 g  12.5-25 g IntraVENous PRN    glucagon (GLUCAGEN) injection 1 mg  1 mg IntraMUSCular PRN    insulin lispro (HUMALOG) injection   SubCUTAneous Q6H    scopolamine (TRANSDERM-SCOP) 1 mg over 3 days 1 Patch  1 Patch TransDERmal Q72H    sodium chloride (NS) flush 10 mL  10 mL InterCATHeter Q24H    sodium chloride (NS) flush 10 mL  10 mL InterCATHeter Q8H    alteplase (CATHFLO) 1 mg in sterile water (preservative free) 1 mL injection  1 mg InterCATHeter PRN    bacitracin 500 unit/gram packet 1 Packet  1 Packet Topical PRN    sodium chloride (NS) flush 20 mL  20 mL InterCATHeter PRN    sodium chloride (NS) flush 10 mL  10 mL InterCATHeter Q24H    sodium chloride (NS) flush 10 mL  10 mL InterCATHeter PRN    sodium chloride (NS) flush 10 mL  10 mL InterCATHeter Q8H    dronabinol (MARINOL) capsule 2.5 mg  2.5 mg Oral BID    phenol throat spray (CHLORASEPTIC) 1 Spray  1 Spray Oral PRN    ondansetron (ZOFRAN) injection 4 mg  4 mg IntraVENous Q4H PRN    promethazine (PHENERGAN) 12.5 mg in 0.9% sodium chloride 50 mL IVPB  12.5 mg IntraVENous Q6H PRN    LORazepam (ATIVAN) injection 1 mg  1 mg IntraVENous Q6H PRN    albuterol (PROVENTIL VENTOLIN) nebulizer solution 2.5 mg  2.5 mg Nebulization Q4H PRN          LAB AND IMAGING FINDINGS:     Lab Results   Component Value Date/Time    WBC 10.8 04/02/2018 04:47 AM    HGB 7.4 (L) 04/02/2018 04:47 AM    PLATELET 859 (H) 23/62/9721 04:47 AM     Lab Results   Component Value Date/Time    Sodium 137 04/02/2018 04:47 AM    Potassium 4.5 04/02/2018 04:47 AM    Chloride 107 04/02/2018 04:47 AM    CO2 24 04/02/2018 04:47 AM    BUN 15 04/02/2018 04:47 AM    Creatinine 0.56 04/02/2018 04:47 AM    Calcium 9.0 04/02/2018 04:47 AM    Magnesium 1.7 03/29/2018 04:29 AM    Phosphorus 5.1 (H) 04/01/2018 05:25 AM      Lab Results   Component Value Date/Time    AST (SGOT) 10 (L) 03/29/2018 04:29 AM    Alk. phosphatase 95 03/29/2018 04:29 AM    Protein, total 6.7 03/29/2018 04:29 AM    Albumin 1.2 (L) 03/29/2018 04:29 AM    Globulin 5.5 (H) 03/29/2018 04:29 AM     Lab Results   Component Value Date/Time    INR 1.0 12/07/2017 08:37 AM    Prothrombin time 10.0 12/07/2017 08:37 AM      No results found for: IRON, FE, TIBC, IBCT, PSAT, FERR   No results found for: PH, PCO2, PO2  No components found for: GLPOC   No results found for: CPK, CKMB             Total time:   Counseling / coordination time, spent as noted above:   > 50% counseling / coordination?:     Prolonged service was provided for  []30 min   []75 min in face to face time in the presence of the patient, spent as noted above. Time Start:   Time End:   Note: this can only be billed with 53971 (initial) or 42570 (follow up). If multiple start / stop times, list each separately.

## 2018-04-03 NOTE — PROGRESS NOTES
Bedside shift change report given to Jemma Angelo rn (oncoming nurse) by Lianna Mckeon (offgoing nurse). Report included the following information SBAR, Kardex, OR Summary, Procedure Summary, Intake/Output and MAR.

## 2018-04-03 NOTE — PROGRESS NOTES
Problem: Mobility Impaired (Adult and Pediatric)  Goal: *Acute Goals and Plan of Care (Insert Text)  Physical Therapy Goals  Revised 3/27/2018  1. Patient will move from supine to sit and sit to supine  in bed with moderate assistance  within 7 day(s). 2.  Patient will transfer from bed to chair and chair to bed with moderate assistance  using the least restrictive device within 7 day(s). 3.  Patient will perform sit to stand with moderate assistance  within 7 day(s). 4.  Patient will ambulate 100' with moderate assist x1 using the least restrictive device within 7 day(s). Revisited 3/19/2018, goals remain appropriate, carry over    Physical Therapy Goals  Initiated 3/8/2018  1. Patient will move from supine to sit and sit to supine  in bed with moderate assistance  within 7 day(s). 2.  Patient will transfer from bed to chair and chair to bed with moderate assistance  using the least restrictive device within 7 day(s). 3.  Patient will perform sit to stand with moderate assistance  within 7 day(s). 4.  PT will assess gait with the least restrictive device within 7 day(s). physical Therapy TREATMENT  Patient: Zita (38 y.o. female)  Date: 4/3/2018  Diagnosis: FISTULA  Small bowel fistula <principal problem not specified>  Procedure(s) (LRB):  EXPLORATORY LAPAROTOMY, LYSIS OF ADHESION X 5HOURS, SMALL BOWEL FISTULA TAKE DOWN X 2 AND WOUND VAC PLACEMENT (N/A) 27 Days Post-Op  Precautions:    Chart, physical therapy assessment, plan of care and goals were reviewed. ASSESSMENT:  Pt agreeable to therapy. Pt was able to sit EOB with mod A. Pt had increase anxiety due to not using chair position. Pt required an elevated surface to complete stand. Pt able to side step to the [facility]. Pt expressed sore ankles with weightbearing and anxiety with mobility. Pt declined sitting in the chair due to height. Will attempt to obtain stretcher chair. Pt tolerating chair position.  Pt may benefit from inpt rehab to progress mobility and independence. Pt has a goal of being able to ascend/descent a flight of steps to get to her bedroom. Progression toward goals:  [x]    Improving appropriately and progressing toward goals  []    Improving slowly and progressing toward goals  []    Not making progress toward goals and plan of care will be adjusted     PLAN:  Patient continues to benefit from skilled intervention to address the above impairments. Continue treatment per established plan of care. Discharge Recommendations:  Inpatient Rehab  Further Equipment Recommendations for Discharge:  TBD     SUBJECTIVE:   Patient stated I am scared of falling.     OBJECTIVE DATA SUMMARY:   Critical Behavior:  Neurologic State: Alert  Orientation Level: Oriented X4  Cognition: Appropriate decision making     Functional Mobility Training:  Bed Mobility:     Supine to Sit: Moderate assistance              Transfers:  Sit to Stand: Contact guard assistance  Stand to Sit: Contact guard assistance                  pt able to side step to [facility] with rolling walker 3 feet. Balance:  Sitting: Impaired  Sitting - Static: Fair (occasional)  Ambulation/Gait Training:                                                       Stairs:              Neuro Re-Education:    Therapeutic Exercises:     Pain:  Pain Scale 1: Numeric (0 - 10)  Pain Intensity 1: 9  Pain Location 1: Abdomen; Ankle  Pain Orientation 1: Anterior  Pain Description 1: Aching  Pain Intervention(s) 1: Medication (see MAR)  Activity Tolerance:   Limited   Please refer to the flowsheet for vital signs taken during this treatment.   After treatment:   []    Patient left in no apparent distress sitting up in chair  [x]    Patient left in no apparent distress in bed-chair position   [x]    Call bell left within reach  [x]    Nursing notified  []    Caregiver present  []    Bed alarm activated    COMMUNICATION/COLLABORATION:   The patients plan of care was discussed with: Registered Nurse    Emerald Mendes, PTA   Time Calculation: 19 mins

## 2018-04-04 LAB
ANION GAP SERPL CALC-SCNC: 8 MMOL/L (ref 5–15)
BUN SERPL-MCNC: 22 MG/DL (ref 6–20)
BUN/CREAT SERPL: 37 (ref 12–20)
CALCIUM SERPL-MCNC: 8.9 MG/DL (ref 8.5–10.1)
CHLORIDE SERPL-SCNC: 105 MMOL/L (ref 97–108)
CO2 SERPL-SCNC: 24 MMOL/L (ref 21–32)
CREAT SERPL-MCNC: 0.6 MG/DL (ref 0.55–1.02)
GLUCOSE BLD STRIP.AUTO-MCNC: 102 MG/DL (ref 65–100)
GLUCOSE BLD STRIP.AUTO-MCNC: 162 MG/DL (ref 65–100)
GLUCOSE BLD STRIP.AUTO-MCNC: 213 MG/DL (ref 65–100)
GLUCOSE BLD STRIP.AUTO-MCNC: 218 MG/DL (ref 65–100)
GLUCOSE BLD STRIP.AUTO-MCNC: 264 MG/DL (ref 65–100)
GLUCOSE SERPL-MCNC: 250 MG/DL (ref 65–100)
MAGNESIUM SERPL-MCNC: 1.6 MG/DL (ref 1.6–2.4)
PHOSPHATE SERPL-MCNC: 2.4 MG/DL (ref 2.6–4.7)
POTASSIUM SERPL-SCNC: 3.3 MMOL/L (ref 3.5–5.1)
SERVICE CMNT-IMP: ABNORMAL
SODIUM SERPL-SCNC: 137 MMOL/L (ref 136–145)

## 2018-04-04 PROCEDURE — 80048 BASIC METABOLIC PNL TOTAL CA: CPT | Performed by: SURGERY

## 2018-04-04 PROCEDURE — 97535 SELF CARE MNGMENT TRAINING: CPT | Performed by: OCCUPATIONAL THERAPIST

## 2018-04-04 PROCEDURE — 84100 ASSAY OF PHOSPHORUS: CPT | Performed by: SURGERY

## 2018-04-04 PROCEDURE — 97165 OT EVAL LOW COMPLEX 30 MIN: CPT | Performed by: OCCUPATIONAL THERAPIST

## 2018-04-04 PROCEDURE — 97110 THERAPEUTIC EXERCISES: CPT | Performed by: OCCUPATIONAL THERAPIST

## 2018-04-04 PROCEDURE — 3331090001 HH PPS REVENUE CREDIT

## 2018-04-04 PROCEDURE — 74011250637 HC RX REV CODE- 250/637: Performed by: INTERNAL MEDICINE

## 2018-04-04 PROCEDURE — 74011250637 HC RX REV CODE- 250/637: Performed by: PHYSICAL MEDICINE & REHABILITATION

## 2018-04-04 PROCEDURE — 74011250637 HC RX REV CODE- 250/637: Performed by: NURSE PRACTITIONER

## 2018-04-04 PROCEDURE — 77030010522

## 2018-04-04 PROCEDURE — 74011250636 HC RX REV CODE- 250/636: Performed by: PHYSICAL MEDICINE & REHABILITATION

## 2018-04-04 PROCEDURE — 74011000258 HC RX REV CODE- 258: Performed by: SURGERY

## 2018-04-04 PROCEDURE — 74011250636 HC RX REV CODE- 250/636: Performed by: SURGERY

## 2018-04-04 PROCEDURE — 74011000250 HC RX REV CODE- 250: Performed by: SURGERY

## 2018-04-04 PROCEDURE — 74011250637 HC RX REV CODE- 250/637: Performed by: PHYSICIAN ASSISTANT

## 2018-04-04 PROCEDURE — 74011250636 HC RX REV CODE- 250/636: Performed by: INTERNAL MEDICINE

## 2018-04-04 PROCEDURE — 97606 NEG PRS WND THER DME>50 SQCM: CPT

## 2018-04-04 PROCEDURE — 74011250636 HC RX REV CODE- 250/636: Performed by: NURSE PRACTITIONER

## 2018-04-04 PROCEDURE — 77030018836 HC SOL IRR NACL ICUM -A

## 2018-04-04 PROCEDURE — 82962 GLUCOSE BLOOD TEST: CPT

## 2018-04-04 PROCEDURE — 74011636637 HC RX REV CODE- 636/637: Performed by: SURGERY

## 2018-04-04 PROCEDURE — 77030018717 HC DRSG GRNUFM KCON -B

## 2018-04-04 PROCEDURE — 83735 ASSAY OF MAGNESIUM: CPT | Performed by: SURGERY

## 2018-04-04 PROCEDURE — 3331090002 HH PPS REVENUE DEBIT

## 2018-04-04 PROCEDURE — 36415 COLL VENOUS BLD VENIPUNCTURE: CPT | Performed by: SURGERY

## 2018-04-04 PROCEDURE — 77030019952 HC CANSTR VAC ASST KCON -B

## 2018-04-04 PROCEDURE — 65270000029 HC RM PRIVATE

## 2018-04-04 RX ORDER — MORPHINE SULFATE 30 MG/1
45 TABLET ORAL
Status: DISCONTINUED | OUTPATIENT
Start: 2018-04-04 | End: 2018-04-25

## 2018-04-04 RX ADMIN — ENOXAPARIN SODIUM 40 MG: 100 INJECTION SUBCUTANEOUS at 10:11

## 2018-04-04 RX ADMIN — INSULIN LISPRO 7 UNITS: 100 INJECTION, SOLUTION INTRAVENOUS; SUBCUTANEOUS at 06:39

## 2018-04-04 RX ADMIN — Medication 10 ML: at 06:40

## 2018-04-04 RX ADMIN — MORPHINE SULFATE 45 MG: 30 TABLET ORAL at 21:09

## 2018-04-04 RX ADMIN — INSULIN LISPRO 4 UNITS: 100 INJECTION, SOLUTION INTRAVENOUS; SUBCUTANEOUS at 01:14

## 2018-04-04 RX ADMIN — MORPHINE SULFATE 30 MG: 15 TABLET ORAL at 01:14

## 2018-04-04 RX ADMIN — MORPHINE SULFATE 5 MG: 2 INJECTION, SOLUTION INTRAMUSCULAR; INTRAVENOUS at 08:54

## 2018-04-04 RX ADMIN — Medication 10 ML: at 14:00

## 2018-04-04 RX ADMIN — MORPHINE SULFATE 30 MG: 15 TABLET ORAL at 03:23

## 2018-04-04 RX ADMIN — MORPHINE SULFATE 100 MG: 100 TABLET, FILM COATED, EXTENDED RELEASE ORAL at 16:42

## 2018-04-04 RX ADMIN — MORPHINE SULFATE 30 MG: 15 TABLET ORAL at 10:09

## 2018-04-04 RX ADMIN — MORPHINE SULFATE 115 MG: 15 TABLET, EXTENDED RELEASE ORAL at 23:12

## 2018-04-04 RX ADMIN — MORPHINE SULFATE 5 MG: 4 INJECTION, SOLUTION INTRAMUSCULAR; INTRAVENOUS at 05:02

## 2018-04-04 RX ADMIN — Medication 10 ML: at 13:29

## 2018-04-04 RX ADMIN — DRONABINOL 2.5 MG: 2.5 CAPSULE ORAL at 17:40

## 2018-04-04 RX ADMIN — ONDANSETRON HYDROCHLORIDE 4 MG: 2 INJECTION INTRAMUSCULAR; INTRAVENOUS at 07:04

## 2018-04-04 RX ADMIN — Medication 10 ML: at 18:14

## 2018-04-04 RX ADMIN — DRONABINOL 2.5 MG: 2.5 CAPSULE ORAL at 10:09

## 2018-04-04 RX ADMIN — INSULIN LISPRO 3 UNITS: 100 INJECTION, SOLUTION INTRAVENOUS; SUBCUTANEOUS at 13:16

## 2018-04-04 RX ADMIN — ASCORBIC ACID: 500 INJECTION, SOLUTION INTRAMUSCULAR; INTRAVENOUS; SUBCUTANEOUS at 18:15

## 2018-04-04 RX ADMIN — MORPHINE SULFATE 30 MG: 15 TABLET ORAL at 06:38

## 2018-04-04 RX ADMIN — ASPIRIN 325 MG: 325 TABLET ORAL at 10:10

## 2018-04-04 RX ADMIN — FUROSEMIDE 20 MG: 10 INJECTION, SOLUTION INTRAMUSCULAR; INTRAVENOUS at 10:13

## 2018-04-04 RX ADMIN — MORPHINE SULFATE 5 MG: 4 INJECTION, SOLUTION INTRAMUSCULAR; INTRAVENOUS at 20:36

## 2018-04-04 RX ADMIN — Medication 10 ML: at 20:37

## 2018-04-04 RX ADMIN — MORPHINE SULFATE 45 MG: 30 TABLET ORAL at 17:40

## 2018-04-04 RX ADMIN — WATER 1 MG: 1 INJECTION INTRAMUSCULAR; INTRAVENOUS; SUBCUTANEOUS at 21:11

## 2018-04-04 RX ADMIN — MORPHINE SULFATE 100 MG: 100 TABLET, FILM COATED, EXTENDED RELEASE ORAL at 08:30

## 2018-04-04 RX ADMIN — I.V. FAT EMULSION 250 ML: 20 EMULSION INTRAVENOUS at 18:14

## 2018-04-04 RX ADMIN — MORPHINE SULFATE 45 MG: 30 TABLET ORAL at 15:16

## 2018-04-04 RX ADMIN — MORPHINE SULFATE 5 MG: 4 INJECTION, SOLUTION INTRAMUSCULAR; INTRAVENOUS at 13:19

## 2018-04-04 RX ADMIN — MORPHINE SULFATE 115 MG: 15 TABLET, EXTENDED RELEASE ORAL at 00:24

## 2018-04-04 NOTE — DIABETES MGMT
DTC Progress Note    Recommendations/ Comments: Chart reviewed due to hyperglycemia. BG's had been stable but have increased to > 200 mg/dL in the past 24 hours. Noted insulin in TPN increased tonight. Current hospital DM medication:  Lispro resistant correction scale  TPN with insulin - cyclic 7 PM to 7 AM. Last bag had 20 units/L, total, 48 units. This evening increases to 24 units/L, total 58 untis     DTC to follow. Chart reviewed on [address] Patient is a 38 y.o. female with known diabetes on Humalog correction scale at home. A1c:   No results found for: HBA1C, HGBE8, AAZ9RUNY, NFY7NCEO    Recent Glucose Results:   Lab Results   Component Value Date/Time     (H) 04/04/2018 03:52 AM    GLUCPOC 162 (H) 04/04/2018 12:16 PM    GLUCPOC 218 (H) 04/04/2018 09:57 AM    GLUCPOC 264 (H) 04/04/2018 06:20 AM        Lab Results   Component Value Date/Time    Creatinine 0.60 04/04/2018 03:52 AM     Estimated Creatinine Clearance: 188.5 mL/min (based on Cr of 0.6). Active Orders   Diet    DIET CLEAR LIQUID        PO intake:   Patient Vitals for the past 72 hrs:   % Diet Eaten   04/04/18 1316 50 %   04/04/18 1001 0 %       Will continue to follow as needed.     Thank you  Min Shine RN, CDE

## 2018-04-04 NOTE — PROGRESS NOTES
Pt cleared by nsg, however pt just had new bag placed and was likely still uncomfortable. Pt stating she was still very uncomfortable and the bag was pinching still, but that she had been doing lots of bed exercises. Reviewed all exercises with pt including bridging, which pt states she has been having a hard time with. Pt says she feels she will be ready to participate tomorrow and she hopes to go to rehab after leaving here. Will follow.

## 2018-04-04 NOTE — WOUND CARE
WOCN Note:     Follow-up visit for [facility] dressing change with Dr. Wes Sanchez at bedside and also assisted by Yaron Veliz, wound care nurse. Chart shows:  Admitted for fistula takdown 3/7/18 by Dr. Cher Tavera with [facility] placement in [address] [place] [address]; history of multiple abdominal surgeries and Crohns disease. Admitted from home. Mother able to provide assistance in pouching prior to admission and became very competent in doing so.      Assessment:   Patient is A&O x 4, continent and mobile - moves around room with assistance. Bed: total care bariatric  Pre-medicated by RN.      1. Midline abdominal surgical wound = 17 x 14 x 3.3 cm (smaller by several cm's)  95% bright red & granular wound base and 5% stomatized mucus membrane in two areas close to each other centrally with peristalsis. Loose sutures noted distally. Dr. Wes Sanchez inserted red rubber catheter into fistula. 1 piece of black sponge with central hole to allow for pouching of the fistulas; 2 Hawk's rings placed around hole top and bottom to achieve seal, paste added. After seal achieved, a convex pouch was placed to collect fistula output. 50 mmHg continuous suction. Red rubber catheter brought out through pouch and sealed with an Hawk's ring. Then secured to LUQ with tube securing device. End of red rubber was capped with 60cc syringe to collect any output.     Goal is to find a system for use at home until wound is small enough to pouch.       Colostomy: stoma is red & moist; small amount of mucoid tan output; peristomal skin intact with mucosal transplantation noted; 2-piece flat pouch replaced today.        Wound Recommendations:    Maintain VAC as ordered @ 50mmHg suction using black sponge. Change twice weekly. Continue to empty pouch as needed. Continue to keep abdominal folds dry using stoma powder.      Skin Care & Pressure Relief Recommendations:  Minimize layers of linen/pads under patient to optimize support surface. Turn/reposition approximately every 2 hours and offload heels. Manage incontinence / promote continence; Aloe Vesta to buttocks and sacrum daily and as needed  Bariatric bed    Discussed above plan with patient & RN, Rachael العراقي. Transition of Care: Plan to follow as needed while admitted to hospital with [facility] dressing changes on Monday, Wednesday, & Fridays.     SONY Carroll, RN, [facility]  Certified Wound, Ostomy, Continence Nurse  office [phone]  pager [phone] or call  to page

## 2018-04-04 NOTE — PROGRESS NOTES
NUTRITION COMPLETE ASSESSMENT    RECOMMENDATIONS:   1. If red rubber tube to be used for enteral feedings (per surgeon), would initiate trophic rate of Peptamen 1.5 @ 10 mL/hr x 24 hours + 30 mL flush q 4 hours to evaluate tolerance. -- See recs below for advancement of tube feedings  -- Is it possible to document (or estimate) the length of pt's remaining bowel? 2. Continue TPN at goal  -- Check BG 2 hours into infusion and 1 hour after infusion is completed for most accurate BG readings while on TPN    3. Continue daily weights (standing as able)-- pt is agreeable to standing on the scale with PT tomorrow (last standing weight is from 3/29)     Interventions/Plan:   Food/Nutrient Delivery: TPN as primary source of nutrition     Assessment:   Reason for Assessment:   [x]Reassessment     Diet:  Clear Liquids  Nutritionally Significant Medications: [x] Reviewed & Includes: marinol twice/day; lasix 20 mg daily; humalog correction scale (insulin resistant); zofran q 4 hours prn; compazine q 6 hours prn; phenergen q6 hours prn  TPN: 6%AA D20 @ 90 mL/hr x 1 hour    @ 185 mL/hr x 12 hours    @ 90 mL/hr x 1 hour   + MVI, thiamine (100 mg), 20 units insulin/L, famotidine 20 mg, ascorbic acid 500 mg   + 20 %lipids, 250 mL 3x/week  Meal Intake: Patient Vitals for the past 100 hrs:   % Diet Eaten   04/04/18 1316 50 %   04/04/18 1001 0 %     Subjective:  Emptied 220 mL from fistula drainage bag during visit. She is in good spirits. Only complaint is a small amount of nausea. Objective:  Chart reviewed, discussed with RN and MD.  Pt with wound vac. Today, surgeon inserted red rubber tube into the fistula to evaluate for possible initiation of enteral feedings. Discussed both TPN and enteral nutrition with the pt. Explained that even if tube placement is successful, tube feeding tolerance isn't guaranteed. Discussed our in-house formula option would be Peptamen 1.5 (peptide-based, MCT:LCT 60:40).       Since it's unclear how much bowel is remaining, tolerance will need to be monitored closely and would recommend initiating trophic feedings (10 mL/hr). Discussed with surgeon that [facility] does not carry an elemental formula (ex: Vivonex), so if she is truly short gut, may need to order. To meet 100% of estimated need with Peptamen 1.5 formula, pt would require a goal rate of 60 mL/hr + 1 pkt liquid Prosource BID + 100 mL flush q3 hours. Would increase 10 mL/hr q12 hours to goal secondary to questionable ability to tolerate. This would provide 2340 kcal, 128 g protein and 1910 mL total free water (tf + flush). TPN as ordered is providing a daily average of 2422 kcal, 144 g protein in 2400 mL (2650 mL on lipid days)-- meeting 100% of estimated kcal and protein needs, respectively. GIR not accurate secondary to weight trends. BG have been >200 (250 at 0352 this morning); however, for accuracy, we should be checking 2 hours into infusion and 1 hour after infusion is complete. 14 units correction scale used so far today. Insulin increased to 24 units/L tonight. Decreased phosphorus and potassium with morning labs-- potassium chloride and potassium phosphate added to TPN tonight. Last standing weight is from 3/29/18. Discussed weight inaccuracy in the pt-- pt is agreeable to standing with PT tomorrow. Wrote it on her white board. Estimated Nutrition Needs:   Kcals/day: 1649 Kcals/day (3666-4281 kcal/day (11-15 kcal/kg))  Protein: 120 g (120-150 kcal/day (0.8-1 g/kg))  Fluid: 2250 ml (or 1 mL/kcal or per output)     Based On: Kcal/kg - specify (Comment)  Weight Used: Actual wt (149.9 kg)    Pt expected to meet estimated nutrient needs:  [x]   Yes      Nutrition Diagnosis:   1. 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 Less than optimal parenteral nutrition related to low volume/concentration TPN as evidenced by resolved.     3.   related to   as evidenced by      Goals:     Pt to meet at least 90% of estimated needs via TPN over the next 5-7 days     Monitoring & Evaluation:    - Enteral/parenteral nutrition intake   - Weight/weight change   -      Previous Nutrition Goals Met:  Yes  Previous Recommendations:      Yes    Education & Discharge Needs:   [] None Identified   [] Identified and addressed    [] Participated in care plan, discharge planning, and/or interdisciplinary rounds        Cultural, Samaritan and ethnic food preferences identified:        Skin Integrity: []Intact  []Other  Edema: [x]None []Other  Last BM: 4/3/18 (ileostomy)  Food Allergies: []None []Other    Anthropometrics:    Weight Loss Metrics 4/4/2018 3/7/2018 3/5/2018 2/28/2018 2/27/2018 2/27/2018 2/26/2018   Today's Wt 347 lb 0.1 oz - 304 lb 3.2 oz 306 lb 306 lb 8 oz - 307 lb   BMI - 59.56 kg/m2 52.22 kg/m2 52.52 kg/m2 52.61 kg/m2 - 52.7 kg/m2      Last 3 Recorded Weights in this Encounter    04/02/18 0045 04/03/18 0457 04/04/18 0458   Weight: (!) 167 kg (368 lb 2.7 oz) (!) 167.6 kg (369 lb 7.9 oz) 157.4 kg (347 lb 0.1 oz)      Weight Source: Bed  Height: 5' 4\" (162.6 cm),    Body mass index is 59.56 kg/(m^2).   IBW : 54.4 kg (120 lb),    Usual Body Weight: 135.2 kg (298 lb) (1/2018),      Labs:  Lab Results   Component Value Date/Time    Sodium 137 04/04/2018 03:52 AM    Potassium 3.3 (L) 04/04/2018 03:52 AM    Chloride 105 04/04/2018 03:52 AM    CO2 24 04/04/2018 03:52 AM    Glucose 250 (H) 04/04/2018 03:52 AM    BUN 22 (H) 04/04/2018 03:52 AM    Creatinine 0.60 04/04/2018 03:52 AM    Calcium 8.9 04/04/2018 03:52 AM    Magnesium 1.6 04/04/2018 03:52 AM    Phosphorus 2.4 (L) 04/04/2018 03:52 AM    Albumin 1.2 (L) 03/29/2018 04:29 AM     No results found for: HBA1C, HGBE8, XNQ2SGOA, [id number]  Lab Results   Component Value Date/Time    Glucose 250 (H) 04/04/2018 03:52 AM    Glucose (POC) 162 (H) 04/04/2018 12:16 PM      Lab Results   Component Value Date/Time    ALT (SGPT) 8 (L) 03/29/2018 04:29 AM    AST (SGOT) 10 (L) 03/29/2018 04:29 AM    Alk.  phosphatase 95 03/29/2018 04:29 AM    Bilirubin, direct 0.1 07/07/2009 06:38 PM    Bilirubin, total 0.3 03/29/2018 04:29 AM      Sharon Hernández, [address] [facility]

## 2018-04-04 NOTE — PROGRESS NOTES
Ms. Somers Parents reports ankle pain this AM.  Tm 99 HR: 121 BP: 100/65 Resp Rate: 20 100% sat on room air. Intake/Output Summary (Last 24 hours) at 04/04/18 0908  Last data filed at 04/04/18 0643   Gross per 24 hour   Intake                0 ml   Output             3530 ml   Net            -3530 ml   Exam: Cor: RRR. Lungs: Bilateral breath sounds. Clear to auscultation. Abd: Soft. Tender. Open wound is clean and granulating. There is exposed small bowel mucosa with peristalsis and enteric drainage. Labs:   Recent Results (from the past 12 hour(s))   GLUCOSE, POC    Collection Time: 04/03/18  9:10 PM   Result Value Ref Range    Glucose (POC) 161 (H) 65 - 100 mg/dL    Performed by Gia, POC    Collection Time: 04/04/18 12:28 AM   Result Value Ref Range    Glucose (POC) 213 (H) 65 - 100 mg/dL    Performed by Inés Melara, BASIC    Collection Time: 04/04/18  3:52 AM   Result Value Ref Range    Sodium 137 136 - 145 mmol/L    Potassium 3.3 (L) 3.5 - 5.1 mmol/L    Chloride 105 97 - 108 mmol/L    CO2 24 21 - 32 mmol/L    Anion gap 8 5 - 15 mmol/L    Glucose 250 (H) 65 - 100 mg/dL    BUN 22 (H) 6 - 20 MG/DL    Creatinine 0.60 0.55 - 1.02 MG/DL    BUN/Creatinine ratio 37 (H) 12 - 20      GFR est AA >60 >60 ml/min/1.73m2    GFR est non-AA >60 >60 ml/min/1.73m2    Calcium 8.9 8.5 - 10.1 MG/DL   MAGNESIUM    Collection Time: 04/04/18  3:52 AM   Result Value Ref Range    Magnesium 1.6 1.6 - 2.4 mg/dL   PHOSPHORUS    Collection Time: 04/04/18  3:52 AM   Result Value Ref Range    Phosphorus 2.4 (L) 2.6 - 4.7 MG/DL   GLUCOSE, POC    Collection Time: 04/04/18  6:20 AM   Result Value Ref Range    Glucose (POC) 264 (H) 65 - 100 mg/dL    Performed by Kaylen Romero    A red rubber catheter was carefully advanced into a lumen of the small bowel on the right. The catheter was secured to the skin edge. Continue wound vac therapy.    Clear liquid diet as tolerated. Will renew cyclic TPN and Lipids. Replace K 3.3. Will stop Lasix. Palliative Care following. Continue Physical Therapy.

## 2018-04-04 NOTE — PROGRESS NOTES
Bedside shift change report given to  Yunior Lopez (oncoming nurse) by Belem Torres RN (offgoing nurse). Report included the following information SBAR, Kardex, ED Summary, Procedure Summary, Intake/Output, MAR, Accordion and Recent Results.

## 2018-04-04 NOTE — PROGRESS NOTES
Palliative Medicine Consult  [facility]: [phone] ([phone])    Patient Name: Lorna Hernandez  YOB: 1975    Date of Initial Consult: 3/12/18  Reason for Consult: Pain management, chronic and post op   Requesting Provider: Rajni   Primary Care Physician: Rama Vang MD     SUMMARY:   Lonra Hernandez is a 38 y.o. with a past history of Crohn's disease (followed by Dr Martin Urias), mult surgeries s/p total colectomy w/ end ileostomy, 6/2017 ex lap with small bowel perforation, 6/28/17 ex lap, LPA, repair or enterotomy, SMA stent on L, 7/16/2917 ex lap, KATHRINE, fistula exclusion w/ feeding tube placement and prolonged TPN who was admitted on 3/7/2018 from home for planned surgery, s/p ex lap w/ extensive KATHRINE and small bowel fistula take down, wound vac placement. 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 Known to our team during past hospitalizations. Has had lengthy hospital stays w/ mult complications. Has required [facility] stays in past. Has chronic pain from Crohn's disease (and has not been able to tolerate home Crohn's meds recently due to acute issues- had been on steroids, stopped prior to surgery). Discussion of Remicaide in future. Current medical issues leading to Palliative Medicine involvement include: pain management. Patient's mother, Naina Tanner is NOK.  reviewed, no abberrancies- one prescriber for opioids and one pharmacy. Most recently filled 3/9/18: MSContin 100mg tid and Morphine IR 30mg- 8 tabs a day. Has been tried on equivalent dose of Fentanyl patch w/out same benefit. Discussed Methadone but would have to check w/ her prescriber. PALLIATIVE DIAGNOSES:     1. Chronic abdominal pain from Cronh's disease and hx of surgeries  2. Nausea  3. Anxiety   4. Feeding difficulties due to medical condition- has been on TPN for several months, now tolerating clear tray  5. Edema   6. Grief/depressive sx   7. Constipation   8.  Opioid tolerance      PLAN:     Abd pain from surgical incision and wound care as well as underlying chronic pain from Crohn's which is worse when off her steroids. Has high opioid tolerance, no aberrancies on . We have done opioid rotation in the hospital for her in past. Have discussed w/ provider who follows for her abdominal pain, Dr Sharyn Lugo this stay. Mom Ashley Loredo states that she has one month supply of medications at home. Pain :    1. Cont po pain medications. 2. MSContin- cont 100mg bid and 115mg bedtime. 3. Morphine 45mg scheduled every 3h po. (incr from 30mg yest). Will not incr this further, as it will make weaning harder in future. 4. Cont back up IV Morphine 5mg- change to every 2h prn. Would wait approx 30 min in between and IV and PO dose. .     5. Following to assist w/ transition to po and for emotional support as pt needs. Communicated plan of care with: Palliative IDT; Marilyn DOTSON     GOALS OF CARE / TREATMENT PREFERENCES:     GOALS OF CARE:  Patient/Health Care Proxy Stated Goals:  (get pain better, treatment of acute issues.)      TREATMENT PREFERENCES:   Code Status: Full Code    Advance Care Planning:  Advance Care Planning 3/15/2018   Patient's Healthcare Decision Maker is: Named in scanned ACP document   Primary Decision Maker Name Brigette Hernández   Primary Decision Maker Phone Number [phone]   Primary Decision Maker Relationship to Patient Parent   Secondary Decision Maker Name Bella Newton   Secondary Decision Maker Phone Number [phone]   Secondary Decision Maker Relationship to Patient Unknown   Confirm Advance Directive Yes, on file   Patient Would Like to Complete Advance Directive -   Does the patient have other document types -       Medical Interventions: Full interventions           Other:    As far as possible, the palliative care team has discussed with patient / health care proxy about goals of care / treatment preferences for patient.            HISTORY: HPI/SUBJECTIVE:    The patient is:   [x] Verbal and participatory  [] Non-participatory due to:     Pt doing okay, had a hard night last night w/ little sleep. Feels that the change from  PCA to orals might have been too quick but okay w/ sticking to it. Today red rubber tube placed through her fistula, not yet draining. Please note- on 4/3 this was pt's usage: Dilaudid PCA, 0.6mg IV every 6m in demand- used 15mg in past 8h ~45mg IV Dilaudid in 24h ~225po Dilaudid ~>1000mg oral morphine. Clinical Pain Assessment (nonverbal scale for severity on nonverbal patients):   Clinical Pain Assessment  Severity: 7  Location: all over abdomen   Character: stabbing  Duration: chronic , acute since surgery  Effect: incraesed today after sutures were removed.   Factors: increase with daily care  Frequency: constant          Duration: for how long has pt been experiencing pain (e.g., 2 days, 1 month, years)  Frequency: how often pain is an issue (e.g., several times per day, once every few days, constant)     FUNCTIONAL ASSESSMENT:     Palliative Performance Scale (PPS):  PPS: 50       PSYCHOSOCIAL/SPIRITUAL SCREENING:     Palliative IDT has assessed this patient for cultural preferences / practices and a referral made as appropriate to needs (Cultural Services, Patient Advocacy, Ethics, etc.)    Advance Care Planning:  Advance Care Planning 3/15/2018   Patient's Healthcare Decision Maker is: Named in scanned ACP document   Primary Decision Maker Name Lopez Linder   Primary Decision Maker Phone Number [phone]   Primary Decision Maker Relationship to Patient Parent   Secondary Decision Maker Name Telly Nath Phone Number [phone]   Secondary Decision Maker Relationship to Patient Unknown   Confirm Advance Directive Yes, on file   Patient Would Like to Complete Advance Directive -   Does the patient have other document types -       Any spiritual / Mandaen concerns:  [] Yes /  [x] No    Caregiver Burnout:  [] Yes /  [x] No /  [] No Caregiver Present      Anticipatory grief assessment:   [x] Normal  / [] Maladaptive       ESAS Anxiety: Anxiety: 0    ESAS Depression: Depression: 0        REVIEW OF SYSTEMS:     Positive and pertinent negative findings in ROS are noted above in HPI. The following systems were [x] reviewed / [] unable to be reviewed as noted in HPI  Other findings are noted below. Systems: constitutional, ears/nose/mouth/throat, respiratory, gastrointestinal, genitourinary, musculoskeletal, integumentary, neurologic, psychiatric, endocrine. Positive findings noted below. Modified ESAS Completed by: provider   Fatigue: 4 Drowsiness: 0   Depression: 0 Pain: 7   Anxiety: 0 Nausea: 2   Anorexia: 3 Dyspnea: 0     Constipation: No              PHYSICAL EXAM:     From RN flowsheet:  Wt Readings from Last 3 Encounters:   04/04/18 347 lb 0.1 oz (157.4 kg)   03/05/18 304 lb 3.2 oz (138 kg)   02/28/18 306 lb (138.8 kg)     Blood pressure 108/62, pulse (!) 119, temperature 99.7 °F (37.6 °C), resp. rate 20, height 5' 4\" (1.626 m), weight 347 lb 0.1 oz (157.4 kg), SpO2 96 %.     Pain Scale 1: Numeric (0 - 10)  Pain Intensity 1: 8  Pain Onset 1: post op  Pain Location 1: Abdomen  Pain Orientation 1: Anterior  Pain Description 1: Sharp  Pain Intervention(s) 1: Medication (see MAR)  Last bowel movement, if known: stool output in ostomy     Constitutional: awake, alert, oriented  Eyes: pupils equal, anicteri  ENMT: moist mucous membranes  Respiratory: breathing not labored,  Gastrointestinal: soft, colostomy bag in place, big midline incision w/ wound vac and red rubber tube   Musculoskeletal: no deformity  Skin: warm, dry, pale  Ext: B/l pitting LE edema  Neurologic: following commands, moving all extremities  Psychiatric: full affect, no hallucinations         HISTORY:     Active Problems:    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)      Past Medical History:   Diagnosis Date    Adverse effect of anesthesia     WOKE DURING SURGERY    Arthritis     Blood clot in vein 2017    STOMACH    Crohn's disease ([facility] [address] 8/15/2011    DVT (deep venous thrombosis) ([facility] [address] 8/15/2011    LEFT LEG    Edema     GENERALIZED R/T TPN; WAIST DOWN    Incarcerated ventral hernia 8/15/2011    Lupus     Lyme disease     Psychiatric disorder     ANXIETY    Right flank pain 8/22/2011    Seizures ([facility] [address] 1990    LAST AT AGE 17    Stroke [facility]) 1990    age 17; A WEEK POST OP, HAD SEIZURES AND STROKE, WAS IN COMA FOR A MONTH    Thyroid disease     HYPO-NO MEDS      Past Surgical History:   Procedure Laterality Date    HX APPENDECTOMY      HX GYN  2015    HIDRADENTIS LABIA    HX OTHER SURGICAL      multiple procedures related to her Crohn's disease    HX OTHER SURGICAL      ABDOMINAL SURGERY REMOVING COLON, 2/3 STOMACH, 1/3 SMALL INTESTINE    NE LAP, INCISIONAL HERNIA REPAIR,INCARCERATED  9-10-08    dr. Sherman Pollock      Family History   Problem Relation Age of Onset    Hypertension Father     Stroke Father     Other Father      arthritis    Arthritis-osteo Father     Hypertension Mother     Arthritis-osteo Mother     Anesth Problems Neg Hx       History reviewed, no pertinent family history.   Social History   Substance Use Topics    Smoking status: Former Smoker     Packs/day: 1.00     Years: 16.00     Quit date: 2/27/2017    Smokeless tobacco: Former User      Comment: OFF AND ON    Alcohol use No     Allergies   Allergen Reactions    Sulfa (Sulfonamide Antibiotics) Anaphylaxis    Demerol [Meperidine] Rash    Soma [Carisoprodol] Rash and Nausea Only    Toradol [Ketorolac Tromethamine] Nausea and Vomiting      Current Facility-Administered Medications   Medication Dose Route Frequency    TPN ADULT - CENTRAL   IntraVENous TITRATE    morphine IR (MS IR) tablet 45 mg  45 mg Oral Q3H    morphine injection 5 mg  5 mg IntraVENous DAILY    morphine injection 5 mg  5 mg IntraVENous Q2H PRN    furosemide (LASIX) injection 20 mg  20 mg IntraVENous DAILY    morphine CR (MS CONTIN) tablet 115 mg  115 mg Oral QHS    morphine CR (MS CONTIN) tablet 100 mg  100 mg Oral Q24H    morphine CR (MS CONTIN) tablet 100 mg  100 mg Oral Q24H    aspirin (ASPIRIN) tablet 325 mg  325 mg Oral DAILY    0.9% sodium chloride infusion 250 mL  250 mL IntraVENous PRN    acetaminophen (TYLENOL) tablet 650 mg  650 mg Oral Q6H PRN    diphenhydrAMINE (BENADRYL) capsule 25 mg  25 mg Oral Q6H PRN    enoxaparin (LOVENOX) injection 40 mg  40 mg SubCUTAneous Q24H    fat emulsion 20% (LIPOSYN, INTRAlipid) infusion 250 mL  250 mL IntraVENous Q MON, WED & FRI    nystatin (MYCOSTATIN) 100,000 unit/gram cream   Topical BID    prochlorperazine (COMPAZINE) with saline injection 5 mg  5 mg IntraVENous Q6H PRN    naloxone (NARCAN) injection 0.4 mg  0.4 mg IntraVENous EVERY 2 MINUTES AS NEEDED    glucose chewable tablet 16 g  4 Tab Oral PRN    dextrose (D50W) injection syrg 12.5-25 g  12.5-25 g IntraVENous PRN    glucagon (GLUCAGEN) injection 1 mg  1 mg IntraMUSCular PRN    insulin lispro (HUMALOG) injection   SubCUTAneous Q6H    scopolamine (TRANSDERM-SCOP) 1 mg over 3 days 1 Patch  1 Patch TransDERmal Q72H    sodium chloride (NS) flush 10 mL  10 mL InterCATHeter Q24H    sodium chloride (NS) flush 10 mL  10 mL InterCATHeter Q8H    alteplase (CATHFLO) 1 mg in sterile water (preservative free) 1 mL injection  1 mg InterCATHeter PRN    bacitracin 500 unit/gram packet 1 Packet  1 Packet Topical PRN    sodium chloride (NS) flush 20 mL  20 mL InterCATHeter PRN    sodium chloride (NS) flush 10 mL  10 mL InterCATHeter Q24H    sodium chloride (NS) flush 10 mL  10 mL InterCATHeter PRN    sodium chloride (NS) flush 10 mL  10 mL InterCATHeter Q8H    dronabinol (MARINOL) capsule 2.5 mg  2.5 mg Oral BID    phenol throat spray (CHLORASEPTIC) 1 Spray  1 Spray Oral PRN    ondansetron (ZOFRAN) injection 4 mg  4 mg IntraVENous Q4H PRN    promethazine (PHENERGAN) 12.5 mg in 0.9% sodium chloride 50 mL IVPB  12.5 mg IntraVENous Q6H PRN    LORazepam (ATIVAN) injection 1 mg  1 mg IntraVENous Q6H PRN    albuterol (PROVENTIL VENTOLIN) nebulizer solution 2.5 mg  2.5 mg Nebulization Q4H PRN          LAB AND IMAGING FINDINGS:     Lab Results   Component Value Date/Time    WBC 10.8 04/02/2018 04:47 AM    HGB 7.4 (L) 04/02/2018 04:47 AM    PLATELET 497 (H) 22/47/0530 04:47 AM     Lab Results   Component Value Date/Time    Sodium 137 04/04/2018 03:52 AM    Potassium 3.3 (L) 04/04/2018 03:52 AM    Chloride 105 04/04/2018 03:52 AM    CO2 24 04/04/2018 03:52 AM    BUN 22 (H) 04/04/2018 03:52 AM    Creatinine 0.60 04/04/2018 03:52 AM    Calcium 8.9 04/04/2018 03:52 AM    Magnesium 1.6 04/04/2018 03:52 AM    Phosphorus 2.4 (L) 04/04/2018 03:52 AM      Lab Results   Component Value Date/Time    AST (SGOT) 10 (L) 03/29/2018 04:29 AM    Alk. phosphatase 95 03/29/2018 04:29 AM    Protein, total 6.7 03/29/2018 04:29 AM    Albumin 1.2 (L) 03/29/2018 04:29 AM    Globulin 5.5 (H) 03/29/2018 04:29 AM     Lab Results   Component Value Date/Time    INR 1.0 12/07/2017 08:37 AM    Prothrombin time 10.0 12/07/2017 08:37 AM      No results found for: IRON, FE, TIBC, IBCT, PSAT, FERR   No results found for: PH, PCO2, PO2  No components found for: GLPOC   No results found for: CPK, CKMB             Total time:   Counseling / coordination time, spent as noted above:   > 50% counseling / coordination?:     Prolonged service was provided for  []30 min   []75 min in face to face time in the presence of the patient, spent as noted above. Time Start:   Time End:   Note: this can only be billed with 24346 (initial) or 88442 (follow up). If multiple start / stop times, list each separately.

## 2018-04-04 NOTE — PROGRESS NOTES
Bedside shift change report given to Yunior Lopez (oncoming nurse) by Maria De Jesus Marx RN (offgoing nurse). Report included the following information SBAR, Kardex, ED Summary, Procedure Summary, Intake/Output, MAR, Accordion and Recent Results.

## 2018-04-05 LAB
ANION GAP SERPL CALC-SCNC: 8 MMOL/L (ref 5–15)
BUN SERPL-MCNC: 21 MG/DL (ref 6–20)
BUN/CREAT SERPL: 34 (ref 12–20)
CALCIUM SERPL-MCNC: 8.8 MG/DL (ref 8.5–10.1)
CHLORIDE SERPL-SCNC: 104 MMOL/L (ref 97–108)
CO2 SERPL-SCNC: 26 MMOL/L (ref 21–32)
CREAT SERPL-MCNC: 0.61 MG/DL (ref 0.55–1.02)
GLUCOSE BLD STRIP.AUTO-MCNC: 138 MG/DL (ref 65–100)
GLUCOSE BLD STRIP.AUTO-MCNC: 185 MG/DL (ref 65–100)
GLUCOSE BLD STRIP.AUTO-MCNC: 197 MG/DL (ref 65–100)
GLUCOSE BLD STRIP.AUTO-MCNC: 203 MG/DL (ref 65–100)
GLUCOSE BLD STRIP.AUTO-MCNC: 254 MG/DL (ref 65–100)
GLUCOSE BLD STRIP.AUTO-MCNC: 87 MG/DL (ref 65–100)
GLUCOSE SERPL-MCNC: 208 MG/DL (ref 65–100)
MAGNESIUM SERPL-MCNC: 1.5 MG/DL (ref 1.6–2.4)
PHOSPHATE SERPL-MCNC: 2.7 MG/DL (ref 2.6–4.7)
POTASSIUM SERPL-SCNC: 3.6 MMOL/L (ref 3.5–5.1)
SERVICE CMNT-IMP: ABNORMAL
SERVICE CMNT-IMP: NORMAL
SODIUM SERPL-SCNC: 138 MMOL/L (ref 136–145)

## 2018-04-05 PROCEDURE — 74011250636 HC RX REV CODE- 250/636: Performed by: NURSE PRACTITIONER

## 2018-04-05 PROCEDURE — 74011250636 HC RX REV CODE- 250/636: Performed by: SURGERY

## 2018-04-05 PROCEDURE — 74011250637 HC RX REV CODE- 250/637: Performed by: PHYSICAL MEDICINE & REHABILITATION

## 2018-04-05 PROCEDURE — 3331090001 HH PPS REVENUE CREDIT

## 2018-04-05 PROCEDURE — 74011250637 HC RX REV CODE- 250/637: Performed by: NURSE PRACTITIONER

## 2018-04-05 PROCEDURE — 77030010541

## 2018-04-05 PROCEDURE — 77030010520

## 2018-04-05 PROCEDURE — 36415 COLL VENOUS BLD VENIPUNCTURE: CPT | Performed by: SURGERY

## 2018-04-05 PROCEDURE — 82962 GLUCOSE BLOOD TEST: CPT

## 2018-04-05 PROCEDURE — 74011250636 HC RX REV CODE- 250/636: Performed by: PHYSICAL MEDICINE & REHABILITATION

## 2018-04-05 PROCEDURE — 77030018798 HC PMP KT ENTRL FED COVD -A

## 2018-04-05 PROCEDURE — 74011000258 HC RX REV CODE- 258: Performed by: NURSE PRACTITIONER

## 2018-04-05 PROCEDURE — 65270000029 HC RM PRIVATE

## 2018-04-05 PROCEDURE — 84100 ASSAY OF PHOSPHORUS: CPT | Performed by: SURGERY

## 2018-04-05 PROCEDURE — 74011250637 HC RX REV CODE- 250/637: Performed by: INTERNAL MEDICINE

## 2018-04-05 PROCEDURE — 77030010522

## 2018-04-05 PROCEDURE — 74011250637 HC RX REV CODE- 250/637: Performed by: SURGERY

## 2018-04-05 PROCEDURE — 74011000250 HC RX REV CODE- 250: Performed by: NURSE PRACTITIONER

## 2018-04-05 PROCEDURE — 80048 BASIC METABOLIC PNL TOTAL CA: CPT | Performed by: SURGERY

## 2018-04-05 PROCEDURE — 74011250637 HC RX REV CODE- 250/637: Performed by: PHYSICIAN ASSISTANT

## 2018-04-05 PROCEDURE — 3331090002 HH PPS REVENUE DEBIT

## 2018-04-05 PROCEDURE — 83735 ASSAY OF MAGNESIUM: CPT | Performed by: SURGERY

## 2018-04-05 PROCEDURE — 74011636637 HC RX REV CODE- 636/637: Performed by: NURSE PRACTITIONER

## 2018-04-05 PROCEDURE — 74011636637 HC RX REV CODE- 636/637: Performed by: SURGERY

## 2018-04-05 RX ORDER — MAGNESIUM SULFATE HEPTAHYDRATE 40 MG/ML
2 INJECTION, SOLUTION INTRAVENOUS ONCE
Status: COMPLETED | OUTPATIENT
Start: 2018-04-05 | End: 2018-04-11

## 2018-04-05 RX ORDER — MORPHINE SULFATE 10 MG/ML
5 INJECTION, SOLUTION INTRAMUSCULAR; INTRAVENOUS DAILY
Status: DISCONTINUED | OUTPATIENT
Start: 2018-04-06 | End: 2018-04-07 | Stop reason: CLARIF

## 2018-04-05 RX ADMIN — ASCORBIC ACID: 500 INJECTION, SOLUTION INTRAMUSCULAR; INTRAVENOUS; SUBCUTANEOUS at 19:18

## 2018-04-05 RX ADMIN — Medication 10 ML: at 11:32

## 2018-04-05 RX ADMIN — MORPHINE SULFATE 45 MG: 30 TABLET ORAL at 21:35

## 2018-04-05 RX ADMIN — Medication 10 ML: at 06:00

## 2018-04-05 RX ADMIN — ENOXAPARIN SODIUM 40 MG: 100 INJECTION SUBCUTANEOUS at 10:17

## 2018-04-05 RX ADMIN — MORPHINE SULFATE 45 MG: 30 TABLET ORAL at 02:35

## 2018-04-05 RX ADMIN — Medication 10 ML: at 15:30

## 2018-04-05 RX ADMIN — INSULIN LISPRO 2 UNITS: 100 INJECTION, SOLUTION INTRAVENOUS; SUBCUTANEOUS at 00:10

## 2018-04-05 RX ADMIN — ASPIRIN 325 MG: 325 TABLET ORAL at 10:00

## 2018-04-05 RX ADMIN — INSULIN LISPRO 7 UNITS: 100 INJECTION, SOLUTION INTRAVENOUS; SUBCUTANEOUS at 08:31

## 2018-04-05 RX ADMIN — MORPHINE SULFATE 100 MG: 100 TABLET, FILM COATED, EXTENDED RELEASE ORAL at 07:46

## 2018-04-05 RX ADMIN — MORPHINE SULFATE 45 MG: 30 TABLET ORAL at 06:29

## 2018-04-05 RX ADMIN — MORPHINE SULFATE 100 MG: 100 TABLET, FILM COATED, EXTENDED RELEASE ORAL at 18:02

## 2018-04-05 RX ADMIN — ONDANSETRON HYDROCHLORIDE 4 MG: 2 INJECTION INTRAMUSCULAR; INTRAVENOUS at 14:43

## 2018-04-05 RX ADMIN — DRONABINOL 2.5 MG: 2.5 CAPSULE ORAL at 18:02

## 2018-04-05 RX ADMIN — MORPHINE SULFATE 45 MG: 30 TABLET ORAL at 00:04

## 2018-04-05 RX ADMIN — NYSTATIN: 100000 CREAM TOPICAL at 18:00

## 2018-04-05 RX ADMIN — MORPHINE SULFATE 5 MG: 4 INJECTION, SOLUTION INTRAMUSCULAR; INTRAVENOUS at 19:13

## 2018-04-05 RX ADMIN — MORPHINE SULFATE 45 MG: 30 TABLET ORAL at 10:00

## 2018-04-05 RX ADMIN — MAGNESIUM SULFATE HEPTAHYDRATE 2 G: 40 INJECTION, SOLUTION INTRAVENOUS at 14:49

## 2018-04-05 RX ADMIN — Medication 10 ML: at 19:18

## 2018-04-05 RX ADMIN — Medication 10 ML: at 11:33

## 2018-04-05 RX ADMIN — DRONABINOL 2.5 MG: 2.5 CAPSULE ORAL at 10:01

## 2018-04-05 RX ADMIN — FUROSEMIDE 20 MG: 10 INJECTION, SOLUTION INTRAMUSCULAR; INTRAVENOUS at 10:07

## 2018-04-05 RX ADMIN — MORPHINE SULFATE 45 MG: 30 TABLET ORAL at 12:47

## 2018-04-05 RX ADMIN — MORPHINE SULFATE 45 MG: 30 TABLET ORAL at 15:29

## 2018-04-05 RX ADMIN — Medication 10 ML: at 19:14

## 2018-04-05 RX ADMIN — MORPHINE SULFATE 5 MG: 4 INJECTION, SOLUTION INTRAMUSCULAR; INTRAVENOUS at 11:27

## 2018-04-05 RX ADMIN — MORPHINE SULFATE 115 MG: 15 TABLET, EXTENDED RELEASE ORAL at 23:01

## 2018-04-05 NOTE — DIABETES MGMT
DTC Progress Note    Recommendations/ Comments: Chart reviewed due to hyperglycemia. FBG > 200 mg/dl  Noted insulin in TPN increased tonight. Current hospital DM medication:  Lispro resistant correction scale - she received 14 units yesterday  TPN with insulin - cyclic 7 PM to 7 AM. Last bag had 24 units/L, total, 58 units. This evening increases to 28 units/L, total 67 untis    Nutrition consult note appreciated  Noted -plan to try TF - will evaluate tolerance to feedings per red tube              -TPN at goal. Agree with checking BG 2 hours post TPN starts and 1 hour after it stops to evaluate BG's. Will also need to continue POC during the day to provide lispro                                 correction as needed                                 DTC to follow. Chart reviewed on [facility]. Patient is a 38 y.o. female with known diabetes on Humalog correction scale at home. A1c:   No results found for: HBA1C, HGBE8, CZP4NOLX, [id number]    Recent Glucose Results:   Lab Results   Component Value Date/Time     (H) 04/05/2018 02:50 AM    GLUCPOC 138 (H) 04/05/2018 11:44 AM    GLUCPOC 203 (H) 04/05/2018 09:35 AM    GLUCPOC 254 (H) 04/05/2018 06:34 AM        Lab Results   Component Value Date/Time    Creatinine 0.61 04/05/2018 02:50 AM     Estimated Creatinine Clearance: 185.4 mL/min (based on Cr of 0.61). Active Orders   Diet    DIET CLEAR LIQUID        PO intake:   Patient Vitals for the past 72 hrs:   % Diet Eaten   04/04/18 1600 100 %   04/04/18 1316 50 %   04/04/18 1001 0 %       Will continue to follow as needed.     Thank you  Rose Thompson RN, CDE

## 2018-04-05 NOTE — WOUND CARE
Paged today by RN, Yeny Costa, for leaking fistula pouch over VAC. Red rubber catheter brought up thru pouch was leaking around Hawk's ring used to get a seal.  The pouch was removed leaving the wafer in place. Wafer was bolstered at bottom margin with Hawk's ring and new pouch with smaller hole was applied and sealed again with small piece of Hawk's ring. VAC with a good seal and minimal output in cannister. Planning VAC dressing change tomorrow.   Maik Hall, BRYANN

## 2018-04-05 NOTE — PROGRESS NOTES
Daily Progress Note  [facility] at [address] Date: 3/7/2018  3/7/18: EXPLORATORY LAPAROTOMY, LYSIS OF ADHESION X 5HOURS, SMALL BOWEL FISTULA TAKE DOWN X 2 AND WOUND VAC PLACEMENT     Subjective:     Last 24 hrs: Pain well controlled. Drainage from midline is well controlled, she empties pouch every 2-4 hours, drainage depends on how much she is drinking. She states there has been no output from the red-rubber tubing. Objective:     Blood pressure 108/57, pulse (!) 109, temperature 98.4 °F (36.9 °C), resp. rate 20, height 5' 4\" (1.626 m), weight 157.4 kg (347 lb 0.1 oz), SpO2 100 %. Temp (24hrs), Av.9 °F (37.2 °C), Min:98.4 °F (36.9 °C), Max:99.6 °F (37.6 °C)      _____________________  Physical Exam:     Alert and Oriented, lying in bed, in no acute distress. Cardiovascular: RRR, +3 bilat LE peripheral edema  Lungs:CTAB   Abdomen: Pouch over midline fistula with red rubber tube in place, 500 mL out yesterday. 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 Recent Labs      18   0250  18   0352   NA  138  137   K  3.6  3.3*   CL  104  105   CO2  26  24   GLU  208*  250*   BUN  21*  22*   CREA  0.61  0.60   CA  8.8  8.9   MG  1.5*  1.6   PHOS  2.7  2.4*     No results for input(s): AML, LPSE in the last 72 hours.         ______________________  Medications:    Current Facility-Administered Medications   Medication Dose Route Frequency    [START ON 2018] morphine injection 5 mg  5 mg IntraVENous DAILY    magnesium sulfate 2 g/50 ml IVPB (premix or compounded)  2 g IntraVENous ONCE    TPN ADULT - CENTRAL   IntraVENous TITRATE    morphine IR (MS IR) tablet 45 mg  45 mg Oral Q3H    morphine injection 5 mg  5 mg IntraVENous Q2H PRN    morphine CR (MS CONTIN) tablet 115 mg  115 mg Oral QHS    morphine CR (MS CONTIN) tablet 100 mg  100 mg Oral Q24H    morphine CR (MS CONTIN) tablet 100 mg  100 mg Oral Q24H    aspirin (ASPIRIN) tablet 325 mg  325 mg Oral DAILY    0.9% sodium chloride infusion 250 mL  250 mL IntraVENous PRN    acetaminophen (TYLENOL) tablet 650 mg  650 mg Oral Q6H PRN    diphenhydrAMINE (BENADRYL) capsule 25 mg  25 mg Oral Q6H PRN    enoxaparin (LOVENOX) injection 40 mg  40 mg SubCUTAneous Q24H    fat emulsion 20% (LIPOSYN, INTRAlipid) infusion 250 mL  250 mL IntraVENous Q MON, WED & FRI    nystatin (MYCOSTATIN) 100,000 unit/gram cream   Topical BID    prochlorperazine (COMPAZINE) with saline injection 5 mg  5 mg IntraVENous Q6H PRN    naloxone (NARCAN) injection 0.4 mg  0.4 mg IntraVENous EVERY 2 MINUTES AS NEEDED    glucose chewable tablet 16 g  4 Tab Oral PRN    dextrose (D50W) injection syrg 12.5-25 g  12.5-25 g IntraVENous PRN    glucagon (GLUCAGEN) injection 1 mg  1 mg IntraMUSCular PRN    insulin lispro (HUMALOG) injection   SubCUTAneous Q6H    scopolamine (TRANSDERM-SCOP) 1 mg over 3 days 1 Patch  1 Patch TransDERmal Q72H    sodium chloride (NS) flush 10 mL  10 mL InterCATHeter Q24H    sodium chloride (NS) flush 10 mL  10 mL InterCATHeter Q8H    alteplase (CATHFLO) 1 mg in sterile water (preservative free) 1 mL injection  1 mg InterCATHeter PRN    bacitracin 500 unit/gram packet 1 Packet  1 Packet Topical PRN    sodium chloride (NS) flush 20 mL  20 mL InterCATHeter PRN    sodium chloride (NS) flush 10 mL  10 mL InterCATHeter Q24H    sodium chloride (NS) flush 10 mL  10 mL InterCATHeter PRN    sodium chloride (NS) flush 10 mL  10 mL InterCATHeter Q8H    dronabinol (MARINOL) capsule 2.5 mg  2.5 mg Oral BID    phenol throat spray (CHLORASEPTIC) 1 Spray  1 Spray Oral PRN    ondansetron (ZOFRAN) injection 4 mg  4 mg IntraVENous Q4H PRN    promethazine (PHENERGAN) 12.5 mg in 0.9% sodium chloride 50 mL IVPB  12.5 mg IntraVENous Q6H PRN    LORazepam (ATIVAN) injection 1 mg  1 mg IntraVENous Q6H PRN    albuterol (PROVENTIL VENTOLIN) nebulizer solution 2.5 mg  2.5 mg Nebulization Q4H PRN

## 2018-04-05 NOTE — PROGRESS NOTES
Palliative Medicine Consult  [facility]: [phone]    Patient Name: Tony Ferraro  YOB: 1975    Date of Initial Consult: 3/12/18  Reason for Consult: Pain management, chronic and post op   Requesting Provider: Rajni   Primary Care Physician: Rc Ramsey MD     SUMMARY:   Tony Ferraro is a 38 y.o. with a past history of Crohn's disease (followed by Dr Julio Cesar Johnson), mult surgeries s/p total colectomy w/ end ileostomy, 6/2017 ex lap with small bowel perforation, 6/28/17 ex lap, LPA, repair or enterotomy, SMA stent on L, 7/16/2917 ex lap, KATHRINE, fistula exclusion w/ feeding tube placement and prolonged TPN who was admitted on 3/7/2018 from home for planned surgery, s/p ex lap w/ extensive KATHRINE and small bowel fistula take down, wound vac placement. 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 Had red rubber tube placed into fistula on 4/4 and plan is to start TFs to supplement po intake (on clears) and get off TPN which she has been on for mult months. Known to our team during past hospitalizations. Has had lengthy hospital stays w/ mult complications. Has required [facility] stays in past. Has chronic pain from Crohn's disease (and has not been able to tolerate home Crohn's meds recently due to acute issues- had been on steroids, stopped prior to surgery). Discussion of Remicaide in future. Current medical issues leading to Palliative Medicine involvement include: pain management. Patient's mother, Irma Garrett is NOK.  reviewed, no abberrancies- one prescriber for opioids and one pharmacy. Most recently filled 3/9/18: MSContin 100mg tid and Morphine IR 30mg- 8 tabs a day. Has been tried on equivalent dose of Fentanyl patch w/out same benefit. Discussed Methadone but would have to check w/ her prescriber. PALLIATIVE DIAGNOSES:     1. Chronic abdominal pain from Cronh's disease and hx of surgeries  2. Nausea  3. Anxiety   4.  Feeding difficulties due to medical condition- has been on TPN for several months, now tolerating clear tray  5. Edema   6. Grief/depressive sx   7. Constipation   8. Opioid tolerance      PLAN:     Abd pain from surgical incision and wound care as well as underlying chronic pain from Crohn's which is worse when off her steroids. Has high opioid tolerance, no aberrancies on . We have done opioid rotation in the hospital for her in past. Have discussed w/ provider who follows for her abdominal pain, Dr Patti Blackman this stay. Mom Sara Willoughby states that she has one month supply of medications at home. Pain :    1. Cont po pain medications. 2.Cont  MSContin- cont 100mg bid and 115mg bedtime. 3. Cont Morphine 45mg scheduled every 3h po. . Will not incr this further, as it will make weaning harder in future. 4. Cont back up IV Morphine 5mg- change to every 2h prn. Would wait approx 30 min in between and IV and PO dose. .     5. Following to assist w/ transition to po and for emotional support as pt needs. As pain controlled, will follow intermittently as needed. Please reach out for specific concerns/questions.      Communicated plan of care with: Palliative IDT     GOALS OF CARE / TREATMENT PREFERENCES:     GOALS OF CARE:  Patient/Health Care Proxy Stated Goals:  (get pain better, treatment of acute issues.)      TREATMENT PREFERENCES:   Code Status: Full Code    Advance Care Planning:  Advance Care Planning 3/15/2018   Patient's Healthcare Decision Maker is: Named in scanned ACP document   Primary Decision Maker Name Jessica Bennett   Primary Decision Maker Phone Number [phone]   Primary Decision Maker Relationship to Patient Parent   Secondary Decision Maker Name Telly Nath Phone Number [phone]   Secondary Decision Maker Relationship to Patient Unknown   Confirm Advance Directive Yes, on file   Patient Would Like to Complete Advance Directive -   Does the patient have other document types -       Medical Interventions: Full interventions           Other:    As far as possible, the palliative care team has discussed with patient / health care proxy about goals of care / treatment preferences for patient. HISTORY:         HPI/SUBJECTIVE:    The patient is:   [x] Verbal and participatory  [] Non-participatory due to:     Pt doing okay, going to start TFs today. Pt glad that she is moving forwards and hopes to get off TPN soon. Nausea okay on clear liquids. Please note- on 4/3 this was pt's usage: Dilaudid PCA, 0.6mg IV every 6m in demand- used 15mg in past 8h ~45mg IV Dilaudid in 24h ~225po Dilaudid ~>1000mg oral morphine. Clinical Pain Assessment (nonverbal scale for severity on nonverbal patients):   Clinical Pain Assessment  Severity: 7  Location: all over abdomen   Character: stabbing  Duration: chronic , acute since surgery  Effect: incraesed today after sutures were removed.   Factors: increase with daily care  Frequency: constant          Duration: for how long has pt been experiencing pain (e.g., 2 days, 1 month, years)  Frequency: how often pain is an issue (e.g., several times per day, once every few days, constant)     FUNCTIONAL ASSESSMENT:     Palliative Performance Scale (PPS):  PPS: 50       PSYCHOSOCIAL/SPIRITUAL SCREENING:     Palliative IDT has assessed this patient for cultural preferences / practices and a referral made as appropriate to needs (Cultural Services, Patient Advocacy, Ethics, etc.)    Advance Care Planning:  Advance Care Planning 3/15/2018   Patient's Healthcare Decision Maker is: Named in scanned ACP document   Primary Decision Maker Name Philip Bernard   Primary Decision Maker Phone Number [phone]   Primary Decision Maker Relationship to Patient Parent   Secondary Decision Maker Name Telly Nath Phone Number [phone]   Secondary Decision Maker Relationship to Patient Unknown   Confirm Advance Directive Yes, on file   Patient Would Like to Complete Advance Directive -   Does the patient have other document types -       Any spiritual / Temple concerns:  [] Yes /  [x] No    Caregiver Burnout:  [] Yes /  [x] No /  [] No Caregiver Present      Anticipatory grief assessment:   [x] Normal  / [] Maladaptive       ESAS Anxiety: Anxiety: 0    ESAS Depression: Depression: 0        REVIEW OF SYSTEMS:     Positive and pertinent negative findings in ROS are noted above in HPI. The following systems were [x] reviewed / [] unable to be reviewed as noted in HPI  Other findings are noted below. Systems: constitutional, ears/nose/mouth/throat, respiratory, gastrointestinal, genitourinary, musculoskeletal, integumentary, neurologic, psychiatric, endocrine. Positive findings noted below. Modified ESAS Completed by: provider   Fatigue: 4 Drowsiness: 0   Depression: 0 Pain: 7   Anxiety: 0 Nausea: 2   Anorexia: 3 Dyspnea: 0     Constipation: No              PHYSICAL EXAM:     From RN flowsheet:  Wt Readings from Last 3 Encounters:   04/04/18 347 lb 0.1 oz (157.4 kg)   03/05/18 304 lb 3.2 oz (138 kg)   02/28/18 306 lb (138.8 kg)     Blood pressure 98/61, pulse (!) 111, temperature 98.4 °F (36.9 °C), resp. rate 20, height 5' 4\" (1.626 m), weight 347 lb 0.1 oz (157.4 kg), SpO2 100 %.     Pain Scale 1: Numeric (0 - 10)  Pain Intensity 1: 8  Pain Onset 1: post op  Pain Location 1: Abdomen  Pain Orientation 1: Anterior, Mid  Pain Description 1: Throbbing  Pain Intervention(s) 1: Medication (see MAR)  Last bowel movement, if known: stool output in ostomy     Constitutional: awake, alert, oriented  Eyes: pupils equal, anicteri  ENMT: moist mucous membranes  Respiratory: breathing not labored,  Gastrointestinal: soft, colostomy bag,big midline incision w/ wound vac and red rubber tube   Musculoskeletal: no deformity  Skin: warm, dry, pale  Ext: B/l pitting LE edema  Neurologic: following commands, moving all extremities  Psychiatric: full affect, no hallucinations         HISTORY:     Active Problems:    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)      Past Medical History:   Diagnosis Date    Adverse effect of anesthesia     WOKE DURING SURGERY    Arthritis     Blood clot in vein 2017    STOMACH    Crohn's disease ([facility] 8/15/2011    DVT (deep venous thrombosis) ([facility] 8/15/2011    LEFT LEG    Edema     GENERALIZED R/T TPN; WAIST DOWN    Incarcerated ventral hernia 8/15/2011    Lupus     Lyme disease     Psychiatric disorder     ANXIETY    Right flank pain 8/22/2011    Seizures ([facility] [address] 1990    LAST AT AGE 17    Stroke [facility]) 1990    age 17; A WEEK POST OP, HAD SEIZURES AND STROKE, WAS IN COMA FOR A MONTH    Thyroid disease     HYPO-NO MEDS      Past Surgical History:   Procedure Laterality Date    HX APPENDECTOMY      HX GYN  2015    HIDRADENTIS LABIA    HX OTHER SURGICAL      multiple procedures related to her Crohn's disease    HX OTHER SURGICAL      ABDOMINAL SURGERY REMOVING COLON, 2/3 STOMACH, 1/3 SMALL INTESTINE    NM LAP, INCISIONAL HERNIA REPAIR,INCARCERATED  9-10-08    dr. Galen Bustillo      Family History   Problem Relation Age of Onset    Hypertension Father     Stroke Father     Other Father      arthritis    Arthritis-osteo Father     Hypertension Mother     Arthritis-osteo Mother     Anesth Problems Neg Hx       History reviewed, no pertinent family history.   Social History   Substance Use Topics    Smoking status: Former Smoker     Packs/day: 1.00     Years: 16.00     Quit date: 2/27/2017    Smokeless tobacco: Former User      Comment: OFF AND ON    Alcohol use No     Allergies   Allergen Reactions    Sulfa (Sulfonamide Antibiotics) Anaphylaxis    Demerol [Meperidine] Rash    Soma [Carisoprodol] Rash and Nausea Only    Toradol [Ketorolac Tromethamine] Nausea and Vomiting      Current Facility-Administered Medications   Medication Dose Route Frequency    [START ON 4/6/2018] morphine injection 5 mg 5 mg IntraVENous DAILY    TPN ADULT - CENTRAL   IntraVENous TITRATE    morphine IR (MS IR) tablet 45 mg  45 mg Oral Q3H    morphine injection 5 mg  5 mg IntraVENous Q2H PRN    morphine CR (MS CONTIN) tablet 115 mg  115 mg Oral QHS    morphine CR (MS CONTIN) tablet 100 mg  100 mg Oral Q24H    morphine CR (MS CONTIN) tablet 100 mg  100 mg Oral Q24H    aspirin (ASPIRIN) tablet 325 mg  325 mg Oral DAILY    0.9% sodium chloride infusion 250 mL  250 mL IntraVENous PRN    acetaminophen (TYLENOL) tablet 650 mg  650 mg Oral Q6H PRN    diphenhydrAMINE (BENADRYL) capsule 25 mg  25 mg Oral Q6H PRN    enoxaparin (LOVENOX) injection 40 mg  40 mg SubCUTAneous Q24H    fat emulsion 20% (LIPOSYN, INTRAlipid) infusion 250 mL  250 mL IntraVENous Q MON, WED & FRI    nystatin (MYCOSTATIN) 100,000 unit/gram cream   Topical BID    prochlorperazine (COMPAZINE) with saline injection 5 mg  5 mg IntraVENous Q6H PRN    naloxone (NARCAN) injection 0.4 mg  0.4 mg IntraVENous EVERY 2 MINUTES AS NEEDED    glucose chewable tablet 16 g  4 Tab Oral PRN    dextrose (D50W) injection syrg 12.5-25 g  12.5-25 g IntraVENous PRN    glucagon (GLUCAGEN) injection 1 mg  1 mg IntraMUSCular PRN    insulin lispro (HUMALOG) injection   SubCUTAneous Q6H    scopolamine (TRANSDERM-SCOP) 1 mg over 3 days 1 Patch  1 Patch TransDERmal Q72H    sodium chloride (NS) flush 10 mL  10 mL InterCATHeter Q24H    sodium chloride (NS) flush 10 mL  10 mL InterCATHeter Q8H    alteplase (CATHFLO) 1 mg in sterile water (preservative free) 1 mL injection  1 mg InterCATHeter PRN    bacitracin 500 unit/gram packet 1 Packet  1 Packet Topical PRN    sodium chloride (NS) flush 20 mL  20 mL InterCATHeter PRN    sodium chloride (NS) flush 10 mL  10 mL InterCATHeter Q24H    sodium chloride (NS) flush 10 mL  10 mL InterCATHeter PRN    sodium chloride (NS) flush 10 mL  10 mL InterCATHeter Q8H    dronabinol (MARINOL) capsule 2.5 mg  2.5 mg Oral BID    phenol throat spray (CHLORASEPTIC) 1 Spray  1 Spray Oral PRN    ondansetron (ZOFRAN) injection 4 mg  4 mg IntraVENous Q4H PRN    promethazine (PHENERGAN) 12.5 mg in 0.9% sodium chloride 50 mL IVPB  12.5 mg IntraVENous Q6H PRN    LORazepam (ATIVAN) injection 1 mg  1 mg IntraVENous Q6H PRN    albuterol (PROVENTIL VENTOLIN) nebulizer solution 2.5 mg  2.5 mg Nebulization Q4H PRN          LAB AND IMAGING FINDINGS:     Lab Results   Component Value Date/Time    WBC 10.8 04/02/2018 04:47 AM    HGB 7.4 (L) 04/02/2018 04:47 AM    PLATELET 832 (H) 70/86/7272 04:47 AM     Lab Results   Component Value Date/Time    Sodium 138 04/05/2018 02:50 AM    Potassium 3.6 04/05/2018 02:50 AM    Chloride 104 04/05/2018 02:50 AM    CO2 26 04/05/2018 02:50 AM    BUN 21 (H) 04/05/2018 02:50 AM    Creatinine 0.61 04/05/2018 02:50 AM    Calcium 8.8 04/05/2018 02:50 AM    Magnesium 1.5 (L) 04/05/2018 02:50 AM    Phosphorus 2.7 04/05/2018 02:50 AM      Lab Results   Component Value Date/Time    AST (SGOT) 10 (L) 03/29/2018 04:29 AM    Alk. phosphatase 95 03/29/2018 04:29 AM    Protein, total 6.7 03/29/2018 04:29 AM    Albumin 1.2 (L) 03/29/2018 04:29 AM    Globulin 5.5 (H) 03/29/2018 04:29 AM     Lab Results   Component Value Date/Time    INR 1.0 12/07/2017 08:37 AM    Prothrombin time 10.0 12/07/2017 08:37 AM      No results found for: IRON, FE, TIBC, IBCT, PSAT, FERR   No results found for: PH, PCO2, PO2  No components found for: GLPOC   No results found for: CPK, CKMB             Total time:   Counseling / coordination time, spent as noted above:   > 50% counseling / coordination?:     Prolonged service was provided for  []30 min   []75 min in face to face time in the presence of the patient, spent as noted above. Time Start:   Time End:   Note: this can only be billed with 19530 (initial) or 61206 (follow up). If multiple start / stop times, list each separately.

## 2018-04-05 NOTE — PROGRESS NOTES
Bedside shift change report given to Lore Mckeon (oncoming nurse) by Gautam Bhat (offgoing nurse). Report included the following information SBAR.

## 2018-04-06 LAB
ANION GAP SERPL CALC-SCNC: 5 MMOL/L (ref 5–15)
BUN SERPL-MCNC: 24 MG/DL (ref 6–20)
BUN/CREAT SERPL: 39 (ref 12–20)
CALCIUM SERPL-MCNC: 9 MG/DL (ref 8.5–10.1)
CHLORIDE SERPL-SCNC: 104 MMOL/L (ref 97–108)
CO2 SERPL-SCNC: 25 MMOL/L (ref 21–32)
CREAT SERPL-MCNC: 0.61 MG/DL (ref 0.55–1.02)
ERYTHROCYTE [DISTWIDTH] IN BLOOD BY AUTOMATED COUNT: 19.8 % (ref 11.5–14.5)
GLUCOSE BLD STRIP.AUTO-MCNC: 125 MG/DL (ref 65–100)
GLUCOSE BLD STRIP.AUTO-MCNC: 131 MG/DL (ref 65–100)
GLUCOSE BLD STRIP.AUTO-MCNC: 186 MG/DL (ref 65–100)
GLUCOSE SERPL-MCNC: 188 MG/DL (ref 65–100)
HCT VFR BLD AUTO: 24.2 % (ref 35–47)
HGB BLD-MCNC: 7.4 G/DL (ref 11.5–16)
MCH RBC QN AUTO: 29.7 PG (ref 26–34)
MCHC RBC AUTO-ENTMCNC: 30.6 G/DL (ref 30–36.5)
MCV RBC AUTO: 97.2 FL (ref 80–99)
NRBC # BLD: 0.09 K/UL (ref 0–0.01)
NRBC BLD-RTO: 0.6 PER 100 WBC
PLATELET # BLD AUTO: 425 K/UL (ref 150–400)
PMV BLD AUTO: 9.3 FL (ref 8.9–12.9)
POTASSIUM SERPL-SCNC: 4.8 MMOL/L (ref 3.5–5.1)
RBC # BLD AUTO: 2.49 M/UL (ref 3.8–5.2)
SERVICE CMNT-IMP: ABNORMAL
SODIUM SERPL-SCNC: 134 MMOL/L (ref 136–145)
WBC # BLD AUTO: 15.1 K/UL (ref 3.6–11)

## 2018-04-06 PROCEDURE — 74011250636 HC RX REV CODE- 250/636: Performed by: PHYSICAL MEDICINE & REHABILITATION

## 2018-04-06 PROCEDURE — 3331090001 HH PPS REVENUE CREDIT

## 2018-04-06 PROCEDURE — 77030018717 HC DRSG GRNUFM KCON -B

## 2018-04-06 PROCEDURE — 74011250637 HC RX REV CODE- 250/637: Performed by: PHYSICAL MEDICINE & REHABILITATION

## 2018-04-06 PROCEDURE — 65270000029 HC RM PRIVATE

## 2018-04-06 PROCEDURE — 74011250636 HC RX REV CODE- 250/636: Performed by: INTERNAL MEDICINE

## 2018-04-06 PROCEDURE — 74011250636 HC RX REV CODE- 250/636: Performed by: SURGERY

## 2018-04-06 PROCEDURE — 74011250637 HC RX REV CODE- 250/637: Performed by: INTERNAL MEDICINE

## 2018-04-06 PROCEDURE — 74011250636 HC RX REV CODE- 250/636: Performed by: NURSE PRACTITIONER

## 2018-04-06 PROCEDURE — 77030019952 HC CANSTR VAC ASST KCON -B

## 2018-04-06 PROCEDURE — 80048 BASIC METABOLIC PNL TOTAL CA: CPT | Performed by: NURSE PRACTITIONER

## 2018-04-06 PROCEDURE — 74011250637 HC RX REV CODE- 250/637: Performed by: NURSE PRACTITIONER

## 2018-04-06 PROCEDURE — 85027 COMPLETE CBC AUTOMATED: CPT | Performed by: NURSE PRACTITIONER

## 2018-04-06 PROCEDURE — 74011250637 HC RX REV CODE- 250/637: Performed by: PHYSICIAN ASSISTANT

## 2018-04-06 PROCEDURE — 36415 COLL VENOUS BLD VENIPUNCTURE: CPT | Performed by: NURSE PRACTITIONER

## 2018-04-06 PROCEDURE — 77030010520

## 2018-04-06 PROCEDURE — 82962 GLUCOSE BLOOD TEST: CPT

## 2018-04-06 PROCEDURE — 74011636637 HC RX REV CODE- 636/637: Performed by: SURGERY

## 2018-04-06 PROCEDURE — 77030018836 HC SOL IRR NACL ICUM -A

## 2018-04-06 PROCEDURE — 3331090002 HH PPS REVENUE DEBIT

## 2018-04-06 PROCEDURE — 97606 NEG PRS WND THER DME>50 SQCM: CPT

## 2018-04-06 PROCEDURE — 74011000258 HC RX REV CODE- 258: Performed by: SURGERY

## 2018-04-06 PROCEDURE — 74011000250 HC RX REV CODE- 250: Performed by: SURGERY

## 2018-04-06 PROCEDURE — 97530 THERAPEUTIC ACTIVITIES: CPT

## 2018-04-06 RX ORDER — INSULIN LISPRO 100 [IU]/ML
INJECTION, SOLUTION INTRAVENOUS; SUBCUTANEOUS 2 TIMES DAILY
Status: DISCONTINUED | OUTPATIENT
Start: 2018-04-06 | End: 2018-04-24

## 2018-04-06 RX ADMIN — ENOXAPARIN SODIUM 40 MG: 100 INJECTION SUBCUTANEOUS at 09:40

## 2018-04-06 RX ADMIN — Medication 10 ML: at 21:02

## 2018-04-06 RX ADMIN — MORPHINE SULFATE 5 MG: 4 INJECTION, SOLUTION INTRAMUSCULAR; INTRAVENOUS at 17:34

## 2018-04-06 RX ADMIN — I.V. FAT EMULSION 250 ML: 20 EMULSION INTRAVENOUS at 18:23

## 2018-04-06 RX ADMIN — MORPHINE SULFATE 5 MG: 4 INJECTION, SOLUTION INTRAMUSCULAR; INTRAVENOUS at 10:27

## 2018-04-06 RX ADMIN — MORPHINE SULFATE 45 MG: 30 TABLET ORAL at 21:02

## 2018-04-06 RX ADMIN — MORPHINE SULFATE 45 MG: 30 TABLET ORAL at 07:10

## 2018-04-06 RX ADMIN — Medication 10 ML: at 09:42

## 2018-04-06 RX ADMIN — DRONABINOL 2.5 MG: 2.5 CAPSULE ORAL at 09:40

## 2018-04-06 RX ADMIN — MORPHINE SULFATE 45 MG: 30 TABLET ORAL at 13:38

## 2018-04-06 RX ADMIN — MORPHINE SULFATE 115 MG: 15 TABLET, EXTENDED RELEASE ORAL at 23:24

## 2018-04-06 RX ADMIN — MORPHINE SULFATE 45 MG: 30 TABLET ORAL at 00:04

## 2018-04-06 RX ADMIN — SODIUM CHLORIDE, SODIUM LACTATE, POTASSIUM CHLORIDE, AND CALCIUM CHLORIDE 500 ML: 600; 310; 30; 20 INJECTION, SOLUTION INTRAVENOUS at 02:18

## 2018-04-06 RX ADMIN — MORPHINE SULFATE 45 MG: 30 TABLET ORAL at 10:10

## 2018-04-06 RX ADMIN — Medication 10 ML: at 07:21

## 2018-04-06 RX ADMIN — ASPIRIN 325 MG: 325 TABLET ORAL at 09:40

## 2018-04-06 RX ADMIN — Medication 10 ML: at 14:00

## 2018-04-06 RX ADMIN — MORPHINE SULFATE 5 MG: 4 INJECTION, SOLUTION INTRAMUSCULAR; INTRAVENOUS at 20:07

## 2018-04-06 RX ADMIN — ASCORBIC ACID: 500 INJECTION, SOLUTION INTRAMUSCULAR; INTRAVENOUS; SUBCUTANEOUS at 18:23

## 2018-04-06 RX ADMIN — DRONABINOL 2.5 MG: 2.5 CAPSULE ORAL at 17:34

## 2018-04-06 RX ADMIN — MORPHINE SULFATE 100 MG: 100 TABLET, FILM COATED, EXTENDED RELEASE ORAL at 15:39

## 2018-04-06 RX ADMIN — MORPHINE SULFATE 5 MG: 4 INJECTION, SOLUTION INTRAMUSCULAR; INTRAVENOUS at 22:39

## 2018-04-06 RX ADMIN — MORPHINE SULFATE 100 MG: 100 TABLET, FILM COATED, EXTENDED RELEASE ORAL at 09:40

## 2018-04-06 RX ADMIN — MORPHINE SULFATE 45 MG: 30 TABLET ORAL at 15:39

## 2018-04-06 RX ADMIN — MORPHINE SULFATE 5 MG: 4 INJECTION, SOLUTION INTRAMUSCULAR; INTRAVENOUS at 07:46

## 2018-04-06 RX ADMIN — MORPHINE SULFATE 5 MG: 4 INJECTION, SOLUTION INTRAMUSCULAR; INTRAVENOUS at 12:05

## 2018-04-06 RX ADMIN — INSULIN LISPRO 3 UNITS: 100 INJECTION, SOLUTION INTRAVENOUS; SUBCUTANEOUS at 07:35

## 2018-04-06 NOTE — PROGRESS NOTES
Daily Progress Note  [facility] at [address] Date: 3/7/2018    3/7/18: EXPLORATORY LAPAROTOMY, LYSIS OF ADHESION X 5HOURS, SMALL BOWEL FISTULA TAKE DOWN X 2 AND WOUND VAC PLACEMENT     Subjective:     Last 24 hrs: Pt seen with Dr. Aysha Medina and wound care at bedside. Tolerating TF via the red rubber at 10 mL/hr overnight. RN notes there was scant drainage that appeared to be TF leaking from the fisula, but she did not have any difficulties tolerating TF. Currently getting wound care. Objective:     Blood pressure 103/61, pulse (!) 118, temperature 99.2 °F (37.3 °C), resp. rate 20, height 5' 4\" (1.626 m), weight (!) 168.1 kg (370 lb 9.5 oz), SpO2 100 %. Temp (24hrs), Av °F (37.2 °C), Min:98.7 °F (37.1 °C), Max:99.2 °F (37.3 °C)      _____________________  Physical Exam:     Alert and Oriented, lying in bed, in no acute distress. Cardiovascular: RRR, +3 bilat LE peripheral edema  Lungs:CTAB   Abdomen: Wound is granulating nicely. Fistula with enteric output, unchanged. Red rubber dislodged and replaced by Dr. Aysha Medina, to be secured when wound vac is replaced. 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)            Plan:     Increase TF to 20 mL/hr  Monitor fistula output   Renew TPN  Labs EOD        Derbill Good, ACNP - [facility],  [place], [address], [place]  [phone]    Data Review:    Recent Labs      18   0720   WBC  15.1*   HGB  7.4*   HCT  24.2*   PLT  425*     Recent Labs      18   0720  18   0250  18   0352   NA  134*  138  137   K  4.8  3.6  3.3*   CL  104  104  105   CO2  25  26  24   GLU  188*  208*  250*   BUN  24*  21*  22*   CREA  0.61  0.61  0.60   CA  9.0  8.8  8.9   MG   --   1.5*  1.6   PHOS   --   2.7  2.4*     No results for input(s): AML, LPSE in the last 72 hours.         ______________________  Medications:    Current Facility-Administered Medications   Medication Dose Route Frequency    TPN ADULT - CENTRAL   IntraVENous TITRATE    insulin lispro (HUMALOG) injection   SubCUTAneous BID    morphine injection 5 mg  5 mg IntraVENous DAILY    morphine IR (MS IR) tablet 45 mg  45 mg Oral Q3H    morphine injection 5 mg  5 mg IntraVENous Q2H PRN    morphine CR (MS CONTIN) tablet 115 mg  115 mg Oral QHS    morphine CR (MS CONTIN) tablet 100 mg  100 mg Oral Q24H    morphine CR (MS CONTIN) tablet 100 mg  100 mg Oral Q24H    aspirin (ASPIRIN) tablet 325 mg  325 mg Oral DAILY    0.9% sodium chloride infusion 250 mL  250 mL IntraVENous PRN    acetaminophen (TYLENOL) tablet 650 mg  650 mg Oral Q6H PRN    diphenhydrAMINE (BENADRYL) capsule 25 mg  25 mg Oral Q6H PRN    enoxaparin (LOVENOX) injection 40 mg  40 mg SubCUTAneous Q24H    fat emulsion 20% (LIPOSYN, INTRAlipid) infusion 250 mL  250 mL IntraVENous Q MON, WED & FRI    nystatin (MYCOSTATIN) 100,000 unit/gram cream   Topical BID    prochlorperazine (COMPAZINE) with saline injection 5 mg  5 mg IntraVENous Q6H PRN    naloxone (NARCAN) injection 0.4 mg  0.4 mg IntraVENous EVERY 2 MINUTES AS NEEDED    glucose chewable tablet 16 g  4 Tab Oral PRN    dextrose (D50W) injection syrg 12.5-25 g  12.5-25 g IntraVENous PRN    glucagon (GLUCAGEN) injection 1 mg  1 mg IntraMUSCular PRN    scopolamine (TRANSDERM-SCOP) 1 mg over 3 days 1 Patch  1 Patch TransDERmal Q72H    sodium chloride (NS) flush 10 mL  10 mL InterCATHeter Q24H    sodium chloride (NS) flush 10 mL  10 mL InterCATHeter Q8H    alteplase (CATHFLO) 1 mg in sterile water (preservative free) 1 mL injection  1 mg InterCATHeter PRN    bacitracin 500 unit/gram packet 1 Packet  1 Packet Topical PRN    sodium chloride (NS) flush 20 mL  20 mL InterCATHeter PRN    sodium chloride (NS) flush 10 mL  10 mL InterCATHeter Q24H    sodium chloride (NS) flush 10 mL  10 mL InterCATHeter PRN    sodium chloride (NS) flush 10 mL  10 mL InterCATHeter Q8H  dronabinol (MARINOL) capsule 2.5 mg  2.5 mg Oral BID    phenol throat spray (CHLORASEPTIC) 1 Spray  1 Spray Oral PRN    ondansetron (ZOFRAN) injection 4 mg  4 mg IntraVENous Q4H PRN    promethazine (PHENERGAN) 12.5 mg in 0.9% sodium chloride 50 mL IVPB  12.5 mg IntraVENous Q6H PRN    LORazepam (ATIVAN) injection 1 mg  1 mg IntraVENous Q6H PRN    albuterol (PROVENTIL VENTOLIN) nebulizer solution 2.5 mg  2.5 mg Nebulization Q4H PRN

## 2018-04-06 NOTE — PROGRESS NOTES
At 0140, PT BP was 84/64, . Contacted doctor on call and an order for 500ml of LR was ordered to be given over 2 hours. Narcotic pain meds were held.    0708, BP was 110/63, . Pain meds were given. This morning, wound is leaking around wound vac. Morning nurse and charge nurse aware. Need wound care nurse to round on patient.

## 2018-04-06 NOTE — PROGRESS NOTES
Physical Therapy  Orders received. Chart reviewed. Attempted to see pt, however she is awaiting wound care at 11:30 due to leaking ileostomy. Pt asking for PT to come back this afternoon. Will f/u as able/appropriate. Thanks!   Lizz Zhu, PT

## 2018-04-06 NOTE — PROGRESS NOTES
No complaints this PM. She tolerated tube feeds at 10 ml/hour. Tm 99.6 HR: 118 BP: 103/61 Resp Rate: 20 100% sat on 2 liters/minute via NC. Intake/Output Summary (Last 24 hours) at 04/06/18 1227  Last data filed at 04/06/18 0958   Gross per 24 hour   Intake           1809.5 ml   Output             3425 ml   Net          -1615.5 ml   Abd: Soft, Non tender. Open wound is clean and continuing to granulate. No change in exposed small bowel.   Recent Results (from the past 24 hour(s))   GLUCOSE, POC    Collection Time: 04/05/18  5:27 PM   Result Value Ref Range    Glucose (POC) 87 65 - 100 mg/dL    Performed by Dung Stuart    GLUCOSE, POC    Collection Time: 04/05/18 11:45 PM   Result Value Ref Range    Glucose (POC) 197 (H) 65 - 100 mg/dL    Performed by Dung Stuart    GLUCOSE, POC    Collection Time: 04/06/18  7:14 AM   Result Value Ref Range    Glucose (POC) 186 (H) 65 - 100 mg/dL    Performed by Frannie Pina    CBC W/O DIFF    Collection Time: 04/06/18  7:20 AM   Result Value Ref Range    WBC 15.1 (H) 3.6 - 11.0 K/uL    RBC 2.49 (L) 3.80 - 5.20 M/uL    HGB 7.4 (L) 11.5 - 16.0 g/dL    HCT 24.2 (L) 35.0 - 47.0 %    MCV 97.2 80.0 - 99.0 FL    MCH 29.7 26.0 - 34.0 PG    MCHC 30.6 30.0 - 36.5 g/dL    RDW 19.8 (H) 11.5 - 14.5 %    PLATELET 312 (H) 870 - 400 K/uL    MPV 9.3 8.9 - 12.9 FL    NRBC 0.6 (H) 0  WBC    ABSOLUTE NRBC 0.09 (H) 0.00 - 1.80 K/uL   METABOLIC PANEL, BASIC    Collection Time: 04/06/18  7:20 AM   Result Value Ref Range    Sodium 134 (L) 136 - 145 mmol/L    Potassium 4.8 3.5 - 5.1 mmol/L    Chloride 104 97 - 108 mmol/L    CO2 25 21 - 32 mmol/L    Anion gap 5 5 - 15 mmol/L    Glucose 188 (H) 65 - 100 mg/dL    BUN 24 (H) 6 - 20 MG/DL    Creatinine 0.61 0.55 - 1.02 MG/DL    BUN/Creatinine ratio 39 (H) 12 - 20      GFR est AA >60 >60 ml/min/1.73m2    GFR est non-AA >60 >60 ml/min/1.73m2    Calcium 9.0 8.5 - 10.1 MG/DL   GLUCOSE, POC    Collection Time: 04/06/18 12:05 PM Result Value Ref Range    Glucose (POC) 131 (H) 65 - 100 mg/dL    Performed by [facility]    Feeding tube replaced and secured to abdominal wall. Clear liquid diet as tolerated. Continue cyclic TPN and lipids. Will increase tube feeds to 20ml/hour. Palliative Care following. Continue Physical Therapy. Continue wound vac therapy per wound care nurses. DVT Prophylaxis - Lovenox.

## 2018-04-06 NOTE — PROGRESS NOTES
Problem: Mobility Impaired (Adult and Pediatric)  Goal: *Acute Goals and Plan of Care (Insert Text)  Physical Therapy Goals  Update 4/6/2018  1. Patient will move from supine to sit and sit to supine  in bed with moderate assistance  within 7 day(s). 2.  Patient will transfer from bed to chair and chair to bed with moderate assistance  using the least restrictive device within 7 day(s). 3.  Patient will perform sit to stand with CGA within 7 days  4. Patient will ambulate 22' with moderate assist x 1 using LRAD within 7 days. Revised 3/27/2018  1. Patient will move from supine to sit and sit to supine  in bed with moderate assistance  within 7 day(s). 2.  Patient will transfer from bed to chair and chair to bed with moderate assistance  using the least restrictive device within 7 day(s). 3.  Patient will perform sit to stand with moderate assistance  within 7 day(s). 4.  Patient will ambulate 100' with moderate assist x1 using the least restrictive device within 7 day(s). Revisited 3/19/2018, goals remain appropriate, carry over    Physical Therapy Goals  Initiated 3/8/2018  1. Patient will move from supine to sit and sit to supine  in bed with moderate assistance  within 7 day(s). 2.  Patient will transfer from bed to chair and chair to bed with moderate assistance  using the least restrictive device within 7 day(s). 3.  Patient will perform sit to stand with moderate assistance  within 7 day(s). 4.  PT will assess gait with the least restrictive device within 7 day(s). physical Therapy TREATMENT: WEEKLY REASSESSMENT  Patient:  Estelita Bush (38 y.o. 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 ASSESSMENT:  Pt received resting in bed and agreeable to attempt standing. She wished to place bed in chair position in order to stand, rather than come to EOB. Pt required additional time between all movements due to her high anxiety. She was easily re-directed and able to stand with min A x 1 to RW. She stood x 2 mins and 15 secs before returning to sitting. She was able to scoot hips back for better positioning and left in chair position. Pt c/o abdominal discomfort and L foot pain. She is highly motivated and would make a great inpatient rehab candidate to maximize her functional independence and work towards her PLOF. Patient's progression toward goals since last assessment: Pt cooperative with PT and progressing with bed mobility/transfers     PLAN:  Goals have been updated based on progression since last assessment. Patient continues to benefit from skilled intervention to address the above impairments. Continue to follow the patient 5 times a week to address goals. Planned Interventions:  [x]              Bed Mobility Training             []       Neuromuscular Re-Education  [x]              Transfer Training                   []       Orthotic/Prosthetic Training  [x]              Gait Training                         []       Modalities  [x]              Therapeutic Exercises           []       Edema Management/Control  [x]              Therapeutic Activities            [x]       Patient and Family Training/Education  []              Other (comment):  Discharge Recommendations: Rehab  Further Equipment Recommendations for Discharge: tbd     SUBJECTIVE:   Patient stated I want to stand. I want to try all I can.     OBJECTIVE DATA SUMMARY:   Critical Behavior:  Neurologic State: Alert  Orientation Level: Oriented X4  Cognition: Follows commands       Strength:   Strength: Generally decreased, functional                      Functional Mobility Training:  Bed Mobility:     Supine to Sit: Total assistance (bed placd in chair position)              Transfers:  Sit to Stand: Minimum assistance;Assist x1  Stand to Sit: Minimum assistance;Assist x1                             Balance:  Sitting: Intact (bed in chair position)  Standing: Intact; With support (using RW)    Pain:  Pain Scale 1: Numeric (0 - 10)  Pain Intensity 1: 7  Pain Location 1: Abdomen        Pain Intervention(s) 1: Medication (see MAR)  Activity Tolerance:   No apparent distress   Please refer to the flowsheet for vital signs taken during this treatment.   After treatment:   []  Patient left in no apparent distress sitting up in chair  [x]  Patient left in no apparent distress in bed  [x]  Call bell left within reach  [x]  Nursing notified  []  Caregiver present  []  Bed alarm activated    COMMUNICATION/COLLABORATION:   The patients plan of care was discussed with: Registered Nurse    Marietta Radford, PT   Time Calculation: 30 mins

## 2018-04-06 NOTE — PROGRESS NOTES
Bedside and Verbal shift change report given to Mirza Abraham (oncoming nurse) by Sunshine Townsend (offgoing nurse). Report included the following information SBAR.

## 2018-04-06 NOTE — WOUND CARE
WOCN Note:      Follow-up visit for VAC dressing change. Dr. Bessie Zaragoza and Jalil Finger at bedside. Chart shows:  Admitted for fistula takdown 3/7/18 by Dr. Salena Espinosa with [facility] placement in [address] [place] [address]; history of multiple abdominal surgeries and Crohns disease. Admitted from home.      Assessment:   Patient is A&O x 4, continent and mobile.   Bed: total care bariatric  Pre-medicated by RN.       1. Midline abdominal, Open surgical wound. Measured 4.4.18; 95% red 5% stomatized mucus membrane in two areas close to each other centrally with peristalsis; Loose sutures noted distally. The red rubber catheter was out;  RN states that tube feeding was leaking into wound this morning and tube feeding was being held. Dr. Bessie Zaragoza reinserted red rubber catheter into fistula. 1 piece of black sponge with central hole to allow for pouching of the fistulas;  Hawk's rings placed around hole top and bottom to achieve seal  Seal achieved using paste; a convex pouch was placed to collect fistula output. NPWT resumed at 50 mmHg continuous suction. Red rubber catheter brought out through pouch and sealed with an Hawk's ring. Then secured distally with tape and then looped and secured to LUQ with tube securing device.      Colostomy: stoma is red & moist; moderate amount of mucoid tan output; peristomal skin intact with mucosal transplantation noted; 2-piece flat pouch replaced today.        Wound Recommendations:    Maintain VAC as ordered @ 50mmHg suction using black sponge. Change thrice weekly. Continue to empty pouch as needed. Continue to keep abdominal folds dry using stoma powder.      Skin Care & Pressure Relief Recommendations:  Minimize layers of linen/pads under patient to optimize support surface. Turn/reposition approximately every 2 hours and offload heels. Manage incontinence / promote continence;  Aloe Vesta to buttocks and sacrum daily and as needed  Bariatric bed     Transition of Care: Plan to follow as needed while admitted to hospital with [facility] dressing changes on Monday, Wednesday, & Fridays.     David GUERRERON RN  Wound Care  Office [phone]

## 2018-04-06 NOTE — PROGRESS NOTES
CM following for discharge planning. Note that patient tolerating tube feeds, hope for transition off TPN as TF increase to goal. CM s/w [facility] liaison Melissa Prasad, requested that  review case next week to potentially start insurance auth request as patient progresses toward medical stability. [facility] for next week: Raj Ravi [phone].     Tierney Bee, MSW

## 2018-04-06 NOTE — PROGRESS NOTES
2137 MEWS 5 VS 99.1,131,24,100%, 110/64. Pt placed on 2L 02 Humidified O2. VS 99.1, 100 Radial, 20, 100%, 110/62. MEWS 1.

## 2018-04-06 NOTE — PROGRESS NOTES
Bedside shift change report given to Reji Browne RN (oncoming nurse) by Olivia Rust RN (offgoing nurse). Report included the following information SBAR, Kardex, Intake/Output and MAR.

## 2018-04-07 LAB
ANION GAP SERPL CALC-SCNC: 7 MMOL/L (ref 5–15)
BUN SERPL-MCNC: 23 MG/DL (ref 6–20)
BUN/CREAT SERPL: 38 (ref 12–20)
CALCIUM SERPL-MCNC: 8.9 MG/DL (ref 8.5–10.1)
CHLORIDE SERPL-SCNC: 104 MMOL/L (ref 97–108)
CO2 SERPL-SCNC: 25 MMOL/L (ref 21–32)
CREAT SERPL-MCNC: 0.61 MG/DL (ref 0.55–1.02)
ERYTHROCYTE [DISTWIDTH] IN BLOOD BY AUTOMATED COUNT: 19.8 % (ref 11.5–14.5)
GLUCOSE BLD STRIP.AUTO-MCNC: 231 MG/DL (ref 65–100)
GLUCOSE BLD STRIP.AUTO-MCNC: 89 MG/DL (ref 65–100)
GLUCOSE SERPL-MCNC: 206 MG/DL (ref 65–100)
HCT VFR BLD AUTO: 21.5 % (ref 35–47)
HGB BLD-MCNC: 6.7 G/DL (ref 11.5–16)
MCH RBC QN AUTO: 29.9 PG (ref 26–34)
MCHC RBC AUTO-ENTMCNC: 31.2 G/DL (ref 30–36.5)
MCV RBC AUTO: 96 FL (ref 80–99)
NRBC # BLD: 0.07 K/UL (ref 0–0.01)
NRBC BLD-RTO: 0.5 PER 100 WBC
PLATELET # BLD AUTO: 387 K/UL (ref 150–400)
PMV BLD AUTO: 9.6 FL (ref 8.9–12.9)
POTASSIUM SERPL-SCNC: 4.9 MMOL/L (ref 3.5–5.1)
RBC # BLD AUTO: 2.24 M/UL (ref 3.8–5.2)
SERVICE CMNT-IMP: ABNORMAL
SERVICE CMNT-IMP: NORMAL
SODIUM SERPL-SCNC: 136 MMOL/L (ref 136–145)
WBC # BLD AUTO: 13.2 K/UL (ref 3.6–11)

## 2018-04-07 PROCEDURE — 80048 BASIC METABOLIC PNL TOTAL CA: CPT | Performed by: NURSE PRACTITIONER

## 2018-04-07 PROCEDURE — 86850 RBC ANTIBODY SCREEN: CPT | Performed by: SURGERY

## 2018-04-07 PROCEDURE — 74011250637 HC RX REV CODE- 250/637: Performed by: SURGERY

## 2018-04-07 PROCEDURE — 86921 COMPATIBILITY TEST INCUBATE: CPT | Performed by: SURGERY

## 2018-04-07 PROCEDURE — 74011250637 HC RX REV CODE- 250/637: Performed by: PHYSICIAN ASSISTANT

## 2018-04-07 PROCEDURE — 86920 COMPATIBILITY TEST SPIN: CPT | Performed by: SURGERY

## 2018-04-07 PROCEDURE — 74011250636 HC RX REV CODE- 250/636: Performed by: SURGERY

## 2018-04-07 PROCEDURE — 74011000258 HC RX REV CODE- 258: Performed by: SURGERY

## 2018-04-07 PROCEDURE — 3331090002 HH PPS REVENUE DEBIT

## 2018-04-07 PROCEDURE — 74011636637 HC RX REV CODE- 636/637: Performed by: SURGERY

## 2018-04-07 PROCEDURE — 36415 COLL VENOUS BLD VENIPUNCTURE: CPT | Performed by: NURSE PRACTITIONER

## 2018-04-07 PROCEDURE — 85027 COMPLETE CBC AUTOMATED: CPT | Performed by: NURSE PRACTITIONER

## 2018-04-07 PROCEDURE — 74011636637 HC RX REV CODE- 636/637: Performed by: NURSE PRACTITIONER

## 2018-04-07 PROCEDURE — 65270000029 HC RM PRIVATE

## 2018-04-07 PROCEDURE — 74011250636 HC RX REV CODE- 250/636: Performed by: NURSE PRACTITIONER

## 2018-04-07 PROCEDURE — 86922 COMPATIBILITY TEST ANTIGLOB: CPT | Performed by: SURGERY

## 2018-04-07 PROCEDURE — P9016 RBC LEUKOCYTES REDUCED: HCPCS | Performed by: SURGERY

## 2018-04-07 PROCEDURE — 74011250636 HC RX REV CODE- 250/636: Performed by: PHYSICAL MEDICINE & REHABILITATION

## 2018-04-07 PROCEDURE — 74011250637 HC RX REV CODE- 250/637: Performed by: NURSE PRACTITIONER

## 2018-04-07 PROCEDURE — 74011250637 HC RX REV CODE- 250/637: Performed by: INTERNAL MEDICINE

## 2018-04-07 PROCEDURE — 74011000250 HC RX REV CODE- 250: Performed by: SURGERY

## 2018-04-07 PROCEDURE — 74011250637 HC RX REV CODE- 250/637: Performed by: PHYSICAL MEDICINE & REHABILITATION

## 2018-04-07 PROCEDURE — 3331090001 HH PPS REVENUE CREDIT

## 2018-04-07 PROCEDURE — 82962 GLUCOSE BLOOD TEST: CPT

## 2018-04-07 PROCEDURE — 36430 TRANSFUSION BLD/BLD COMPNT: CPT

## 2018-04-07 RX ORDER — SODIUM CHLORIDE 9 MG/ML
250 INJECTION, SOLUTION INTRAVENOUS AS NEEDED
Status: DISCONTINUED | OUTPATIENT
Start: 2018-04-07 | End: 2018-05-23 | Stop reason: HOSPADM

## 2018-04-07 RX ORDER — MORPHINE SULFATE 4 MG/ML
5 INJECTION, SOLUTION INTRAMUSCULAR; INTRAVENOUS DAILY
Status: DISCONTINUED | OUTPATIENT
Start: 2018-04-07 | End: 2018-04-17 | Stop reason: SDUPTHER

## 2018-04-07 RX ADMIN — DRONABINOL 2.5 MG: 2.5 CAPSULE ORAL at 18:03

## 2018-04-07 RX ADMIN — ASPIRIN 325 MG: 325 TABLET ORAL at 09:46

## 2018-04-07 RX ADMIN — MORPHINE SULFATE 100 MG: 100 TABLET, FILM COATED, EXTENDED RELEASE ORAL at 08:40

## 2018-04-07 RX ADMIN — Medication 10 ML: at 20:11

## 2018-04-07 RX ADMIN — MORPHINE SULFATE 45 MG: 30 TABLET ORAL at 16:15

## 2018-04-07 RX ADMIN — ASCORBIC ACID: 500 INJECTION, SOLUTION INTRAMUSCULAR; INTRAVENOUS; SUBCUTANEOUS at 19:31

## 2018-04-07 RX ADMIN — MORPHINE SULFATE 45 MG: 30 TABLET ORAL at 10:06

## 2018-04-07 RX ADMIN — MORPHINE SULFATE 45 MG: 30 TABLET ORAL at 19:13

## 2018-04-07 RX ADMIN — Medication 10 ML: at 05:55

## 2018-04-07 RX ADMIN — Medication 10 ML: at 09:48

## 2018-04-07 RX ADMIN — MORPHINE SULFATE 45 MG: 30 TABLET ORAL at 00:53

## 2018-04-07 RX ADMIN — Medication 20 ML: at 19:24

## 2018-04-07 RX ADMIN — MORPHINE SULFATE 45 MG: 30 TABLET ORAL at 04:13

## 2018-04-07 RX ADMIN — MORPHINE SULFATE 5 MG: 4 INJECTION, SOLUTION INTRAMUSCULAR; INTRAVENOUS at 20:10

## 2018-04-07 RX ADMIN — DRONABINOL 2.5 MG: 2.5 CAPSULE ORAL at 09:46

## 2018-04-07 RX ADMIN — MORPHINE SULFATE 5 MG: 4 INJECTION, SOLUTION INTRAMUSCULAR; INTRAVENOUS at 11:50

## 2018-04-07 RX ADMIN — MORPHINE SULFATE 45 MG: 30 TABLET ORAL at 13:39

## 2018-04-07 RX ADMIN — INSULIN LISPRO 4 UNITS: 100 INJECTION, SOLUTION INTRAVENOUS; SUBCUTANEOUS at 08:39

## 2018-04-07 RX ADMIN — MORPHINE SULFATE 45 MG: 30 TABLET ORAL at 22:04

## 2018-04-07 RX ADMIN — MORPHINE SULFATE 100 MG: 100 TABLET, FILM COATED, EXTENDED RELEASE ORAL at 16:15

## 2018-04-07 RX ADMIN — Medication 10 ML: at 13:39

## 2018-04-07 RX ADMIN — MORPHINE SULFATE 115 MG: 15 TABLET, EXTENDED RELEASE ORAL at 23:20

## 2018-04-07 RX ADMIN — ONDANSETRON HYDROCHLORIDE 4 MG: 2 INJECTION INTRAMUSCULAR; INTRAVENOUS at 09:38

## 2018-04-07 RX ADMIN — Medication 10 ML: at 22:00

## 2018-04-07 RX ADMIN — ACETAMINOPHEN 650 MG: 325 TABLET, FILM COATED ORAL at 14:21

## 2018-04-07 RX ADMIN — ENOXAPARIN SODIUM 40 MG: 100 INJECTION SUBCUTANEOUS at 10:09

## 2018-04-07 NOTE — PROGRESS NOTES
Bedside shift change report given to Tia Crenshaw RN (oncoming nurse) by Suki López RN (offgoing nurse). Report included the following information SBAR.

## 2018-04-07 NOTE — ROUTINE PROCESS
Bedside shift change report given to MAURI Smith (oncoming nurse) by Virginia Esparza (offgoing nurse). Report included the following information SBAR, Kardex, Intake/Output and MAR.

## 2018-04-07 NOTE — PROGRESS NOTES
Progress Note    Patient: Genna Salas MRN: [id number]  SSN: [id number]    YOB: 1975  Age: 38 y.o. Sex: female      Admit Date: 3/7/2018    31 Days Post-Op    Procedure:  Procedure(s):  EXPLORATORY LAPAROTOMY, LYSIS OF ADHESION X 5HOURS, SMALL BOWEL FISTULA TAKE DOWN X 2 AND WOUND VAC PLACEMENT    Subjective:     No acute surgical issues. Pt tolerating mechanical soft diet. Fistula still with output. Hemoglobin trending down. Pt a bit tachycardic.      Objective:     Visit Vitals    BP 98/58    Pulse 100    Temp 98.6 °F (37 °C)    Resp 20    Ht 5' 4\" (1.626 m)    Wt (!) 357 lb 12.9 oz (162.3 kg)    SpO2 99%    BMI 61.42 kg/m2       Temp (24hrs), Av.9 °F (37.2 °C), Min:98.6 °F (37 °C), Max:99.2 °F (37.3 °C)        Physical Exam:    Gen:  NAD  Pulm:  Unlabored  Abd:  S/ND/appropriate TTP  Wound:  C/D/I  Ostomy:  Pink, patent and productive    Recent Results (from the past 24 hour(s))   GLUCOSE, POC    Collection Time: 18 12:05 PM   Result Value Ref Range    Glucose (POC) 131 (H) 65 - 100 mg/dL    Performed by She Colindres    GLUCOSE, POC    Collection Time: 18  5:18 PM   Result Value Ref Range    Glucose (POC) 125 (H) 65 - 100 mg/dL    Performed by GUNNAR FREDERICK    CBC W/O DIFF    Collection Time: 18  4:15 AM   Result Value Ref Range    WBC 13.2 (H) 3.6 - 11.0 K/uL    RBC 2.24 (L) 3.80 - 5.20 M/uL    HGB 6.7 (L) 11.5 - 16.0 g/dL    HCT 21.5 (L) 35.0 - 47.0 %    MCV 96.0 80.0 - 99.0 FL    MCH 29.9 26.0 - 34.0 PG    MCHC 31.2 30.0 - 36.5 g/dL    RDW 19.8 (H) 11.5 - 14.5 %    PLATELET 025 034 - 768 K/uL    MPV 9.6 8.9 - 12.9 FL    NRBC 0.5 (H) 0  WBC    ABSOLUTE NRBC 0.07 (H) 0.00 - 2.74 K/uL   METABOLIC PANEL, BASIC    Collection Time: 18  4:15 AM   Result Value Ref Range    Sodium 136 136 - 145 mmol/L    Potassium 4.9 3.5 - 5.1 mmol/L    Chloride 104 97 - 108 mmol/L    CO2 25 21 - 32 mmol/L    Anion gap 7 5 - 15 mmol/L    Glucose 206 (H) 65 - 100 mg/dL BUN 23 (H) 6 - 20 MG/DL    Creatinine 0.61 0.55 - 1.02 MG/DL    BUN/Creatinine ratio 38 (H) 12 - 20      GFR est AA >60 >60 ml/min/1.73m2    GFR est non-AA >60 >60 ml/min/1.73m2    Calcium 8.9 8.5 - 10.1 MG/DL   TYPE + CROSSMATCH    Collection Time: 04/07/18  5:30 AM   Result Value Ref Range    Crossmatch Expiration 04/10/2018     ABO/Rh(D) B POSITIVE     Antibody screen NEG     Comment Previously identified anti Jk(a)     Unit number [id number]     Blood component type RC LR     Unit division 00     Status of unit ISSUED     ANTIGEN/ANTIBODY INFO Jk(A) NEGATIVE,     Crossmatch result Compatible    GLUCOSE, POC    Collection Time: 04/07/18  7:30 AM   Result Value Ref Range    Glucose (POC) 231 (H) 65 - 100 mg/dL    Performed by Reji Eller            Assessment:     Hospital Problems  Date Reviewed: 10/27/2017          Codes Class Noted POA    Small bowel fistula ICD-10-CM: K63.2  ICD-9-CM: 569.81  3/7/2018 Unknown              Plan/Recommendations/Medical Decision Making:     - continue mechanical soft diet  - Acute anemia:  Will transfuse 1 unit PRBC  - Pain control  - Out of bed as tolerated  - Upper GI with small bowel follow-through on Monday  - Renew TPN    Signed By: Yousif Wood MD     April 7, 2018

## 2018-04-07 NOTE — ROUTINE PROCESS
Pt's hgb this am is 6.7. Paged Dr. Tosin Bejarano and orders were received to infuse one unit of blood. Consent in chart is from 3/11 which is within 30 days but no physician has signed. Will obtain new consent. New consent signed by patient and on chart. Please get MD to sign.

## 2018-04-07 NOTE — PROGRESS NOTES
Bedside shift change report given to Corrinne (oncoming nurse) by Nakita Flynn (offgoing nurse). Report included the following information SBAR, Kardex, OR Summary, Procedure Summary, Intake/Output, MAR, Accordion and Recent Results.

## 2018-04-08 LAB
ABO + RH BLD: NORMAL
ANTIGENS PRESENT RBC DONR: NORMAL
BLD PROD TYP BPU: NORMAL
BLOOD BANK CMNT PATIENT-IMP: NORMAL
BLOOD GROUP ANTIBODIES SERPL: NORMAL
BPU ID: NORMAL
CROSSMATCH RESULT,%XM: NORMAL
GLUCOSE BLD STRIP.AUTO-MCNC: 120 MG/DL (ref 65–100)
GLUCOSE BLD STRIP.AUTO-MCNC: 193 MG/DL (ref 65–100)
HCT VFR BLD AUTO: 25.7 % (ref 35–47)
HGB BLD-MCNC: 7.8 G/DL (ref 11.5–16)
SERVICE CMNT-IMP: ABNORMAL
SERVICE CMNT-IMP: ABNORMAL
SPECIMEN EXP DATE BLD: NORMAL
STATUS OF UNIT,%ST: NORMAL
UNIT DIVISION, %UDIV: 0

## 2018-04-08 PROCEDURE — 36415 COLL VENOUS BLD VENIPUNCTURE: CPT | Performed by: INTERNAL MEDICINE

## 2018-04-08 PROCEDURE — 65270000029 HC RM PRIVATE

## 2018-04-08 PROCEDURE — 74011000258 HC RX REV CODE- 258: Performed by: SURGERY

## 2018-04-08 PROCEDURE — 82962 GLUCOSE BLOOD TEST: CPT

## 2018-04-08 PROCEDURE — 74011636637 HC RX REV CODE- 636/637: Performed by: SURGERY

## 2018-04-08 PROCEDURE — 74011000250 HC RX REV CODE- 250: Performed by: SURGERY

## 2018-04-08 PROCEDURE — 3331090001 HH PPS REVENUE CREDIT

## 2018-04-08 PROCEDURE — 74011250637 HC RX REV CODE- 250/637: Performed by: PHYSICAL MEDICINE & REHABILITATION

## 2018-04-08 PROCEDURE — 85018 HEMOGLOBIN: CPT | Performed by: INTERNAL MEDICINE

## 2018-04-08 PROCEDURE — 74011250636 HC RX REV CODE- 250/636: Performed by: SURGERY

## 2018-04-08 PROCEDURE — 74011250636 HC RX REV CODE- 250/636: Performed by: PHYSICAL MEDICINE & REHABILITATION

## 2018-04-08 PROCEDURE — 74011250637 HC RX REV CODE- 250/637: Performed by: PHYSICIAN ASSISTANT

## 2018-04-08 PROCEDURE — 74011250636 HC RX REV CODE- 250/636: Performed by: NURSE PRACTITIONER

## 2018-04-08 PROCEDURE — 74011250637 HC RX REV CODE- 250/637: Performed by: INTERNAL MEDICINE

## 2018-04-08 PROCEDURE — 74011636637 HC RX REV CODE- 636/637: Performed by: NURSE PRACTITIONER

## 2018-04-08 PROCEDURE — 3331090002 HH PPS REVENUE DEBIT

## 2018-04-08 PROCEDURE — 74011250637 HC RX REV CODE- 250/637: Performed by: NURSE PRACTITIONER

## 2018-04-08 RX ADMIN — MORPHINE SULFATE 45 MG: 30 TABLET ORAL at 09:09

## 2018-04-08 RX ADMIN — MORPHINE SULFATE 45 MG: 30 TABLET ORAL at 17:00

## 2018-04-08 RX ADMIN — DRONABINOL 2.5 MG: 2.5 CAPSULE ORAL at 17:00

## 2018-04-08 RX ADMIN — ASPIRIN 325 MG: 325 TABLET ORAL at 08:01

## 2018-04-08 RX ADMIN — Medication 10 ML: at 09:14

## 2018-04-08 RX ADMIN — Medication 10 ML: at 20:13

## 2018-04-08 RX ADMIN — MORPHINE SULFATE 45 MG: 30 TABLET ORAL at 06:07

## 2018-04-08 RX ADMIN — Medication 10 ML: at 14:08

## 2018-04-08 RX ADMIN — Medication 10 ML: at 09:13

## 2018-04-08 RX ADMIN — MORPHINE SULFATE 100 MG: 100 TABLET, FILM COATED, EXTENDED RELEASE ORAL at 16:05

## 2018-04-08 RX ADMIN — MORPHINE SULFATE 100 MG: 100 TABLET, FILM COATED, EXTENDED RELEASE ORAL at 08:03

## 2018-04-08 RX ADMIN — MORPHINE SULFATE 5 MG: 4 INJECTION, SOLUTION INTRAMUSCULAR; INTRAVENOUS at 19:11

## 2018-04-08 RX ADMIN — ASCORBIC ACID: 500 INJECTION, SOLUTION INTRAMUSCULAR; INTRAVENOUS; SUBCUTANEOUS at 18:37

## 2018-04-08 RX ADMIN — MORPHINE SULFATE 45 MG: 30 TABLET ORAL at 11:59

## 2018-04-08 RX ADMIN — Medication 10 ML: at 06:10

## 2018-04-08 RX ADMIN — ENOXAPARIN SODIUM 40 MG: 100 INJECTION SUBCUTANEOUS at 09:10

## 2018-04-08 RX ADMIN — MORPHINE SULFATE 45 MG: 30 TABLET ORAL at 20:21

## 2018-04-08 RX ADMIN — DRONABINOL 2.5 MG: 2.5 CAPSULE ORAL at 08:02

## 2018-04-08 RX ADMIN — MORPHINE SULFATE 5 MG: 4 INJECTION, SOLUTION INTRAMUSCULAR; INTRAVENOUS at 09:14

## 2018-04-08 RX ADMIN — MORPHINE SULFATE 115 MG: 15 TABLET, EXTENDED RELEASE ORAL at 23:02

## 2018-04-08 RX ADMIN — INSULIN LISPRO 3 UNITS: 100 INJECTION, SOLUTION INTRAVENOUS; SUBCUTANEOUS at 06:36

## 2018-04-08 RX ADMIN — MORPHINE SULFATE 45 MG: 30 TABLET ORAL at 15:04

## 2018-04-08 RX ADMIN — ONDANSETRON HYDROCHLORIDE 4 MG: 2 INJECTION INTRAMUSCULAR; INTRAVENOUS at 20:13

## 2018-04-08 RX ADMIN — MORPHINE SULFATE 45 MG: 30 TABLET ORAL at 02:07

## 2018-04-08 NOTE — PROGRESS NOTES
Progress Note    Patient: Anil Mckeon MRN: [id number]  SSN: [id number]    YOB: 1975  Age: 38 y.o. Sex: female      Admit Date: 3/7/2018    32 Days Post-Op    Procedure:  Procedure(s):  EXPLORATORY LAPAROTOMY, LYSIS OF ADHESION X 5HOURS, SMALL BOWEL FISTULA TAKE DOWN X 2 AND WOUND VAC PLACEMENT    Subjective:     No acute surgical issues. Pt tolerating mechanical soft diet. Fistula still with high output. Hemoglobin stable after 1 unit PRBC transfusion yesterday. Objective:     Visit Vitals    /81 (BP 1 Location: Left arm, BP Patient Position: At rest)    Pulse (!) 107    Temp 97.9 °F (36.6 °C)    Resp 18    Ht 5' 4\" (1.626 m)    Wt (!) 357 lb 12.9 oz (162.3 kg)    SpO2 97%    BMI 61.42 kg/m2       Temp (24hrs), Av.9 °F (37.2 °C), Min:97.9 °F (36.6 °C), Max:99.4 °F (37.4 °C)        Physical Exam:    Gen:  NAD  Pulm:  Unlabored  Abd:  S/ND/appropriate TTP  Wound:  C/D/I  Ostomy:  Pink, patent and productive    Recent Results (from the past 24 hour(s))   GLUCOSE, POC    Collection Time: 18  7:17 PM   Result Value Ref Range    Glucose (POC) 89 65 - 100 mg/dL    Performed by DORYS BRAXTON    HGB & HCT    Collection Time: 18  4:45 AM   Result Value Ref Range    HGB 7.8 (L) 11.5 - 16.0 g/dL    HCT 25.7 (L) 35.0 - 47.0 %   GLUCOSE, POC    Collection Time: 18  6:09 AM   Result Value Ref Range    Glucose (POC) 193 (H) 65 - 100 mg/dL    Performed by Sonia Barroso            Assessment:     Hospital Problems  Date Reviewed: 10/27/2017          Codes Class Noted POA    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   Will continue to monitor  - Pain control  - Out of bed as tolerated  - Upper GI with small bowel follow-through on Monday  - Renew TPN    Signed By: Lesly Catherine MD     2018

## 2018-04-08 NOTE — ROUTINE PROCESS
Bedside shift change report given to MAURI Smith (oncoming nurse) by Marisol Mcintosh (offgoing nurse). Report included the following information SBAR, Kardex, Intake/Output and MAR.

## 2018-04-08 NOTE — PROGRESS NOTES
Pt received blood yesterday and no redrew of a hgb was done post blood administration.  Paged Dr. Meño Beavers and orders were received for a H&H to be drawn this am.

## 2018-04-08 NOTE — PROGRESS NOTES
Bedside and Verbal shift change report given to Farhana Pisano (oncoming nurse) by Jimenez Davis (offgoing nurse). Report included the following information SBAR, Kardex, Intake/Output and Recent Results.

## 2018-04-08 NOTE — PROGRESS NOTES
Bedside shift change report given to Corinne RN (oncoming nurse) by Rachid Khan RN (offgoing nurse). Report included the following information SBAR.

## 2018-04-09 ENCOUNTER — APPOINTMENT (OUTPATIENT)
Dept: GENERAL RADIOLOGY | Age: 43
DRG: 330 | End: 2018-04-09
Attending: SURGERY
Payer: MEDICARE

## 2018-04-09 LAB
ANION GAP SERPL CALC-SCNC: 7 MMOL/L (ref 5–15)
BUN SERPL-MCNC: 25 MG/DL (ref 6–20)
BUN/CREAT SERPL: 42 (ref 12–20)
CALCIUM SERPL-MCNC: 9.3 MG/DL (ref 8.5–10.1)
CHLORIDE SERPL-SCNC: 104 MMOL/L (ref 97–108)
CO2 SERPL-SCNC: 24 MMOL/L (ref 21–32)
CREAT SERPL-MCNC: 0.6 MG/DL (ref 0.55–1.02)
ERYTHROCYTE [DISTWIDTH] IN BLOOD BY AUTOMATED COUNT: 19.2 % (ref 11.5–14.5)
GLUCOSE BLD STRIP.AUTO-MCNC: 123 MG/DL (ref 65–100)
GLUCOSE BLD STRIP.AUTO-MCNC: 130 MG/DL (ref 65–100)
GLUCOSE BLD STRIP.AUTO-MCNC: 140 MG/DL (ref 65–100)
GLUCOSE SERPL-MCNC: 199 MG/DL (ref 65–100)
HCT VFR BLD AUTO: 25.8 % (ref 35–47)
HGB BLD-MCNC: 7.9 G/DL (ref 11.5–16)
MCH RBC QN AUTO: 29.9 PG (ref 26–34)
MCHC RBC AUTO-ENTMCNC: 30.6 G/DL (ref 30–36.5)
MCV RBC AUTO: 97.7 FL (ref 80–99)
NRBC # BLD: 0.04 K/UL (ref 0–0.01)
NRBC BLD-RTO: 0.3 PER 100 WBC
PLATELET # BLD AUTO: 401 K/UL (ref 150–400)
PMV BLD AUTO: 9.5 FL (ref 8.9–12.9)
POTASSIUM SERPL-SCNC: 4.9 MMOL/L (ref 3.5–5.1)
RBC # BLD AUTO: 2.64 M/UL (ref 3.8–5.2)
SERVICE CMNT-IMP: ABNORMAL
SODIUM SERPL-SCNC: 135 MMOL/L (ref 136–145)
WBC # BLD AUTO: 11.6 K/UL (ref 3.6–11)

## 2018-04-09 PROCEDURE — 97606 NEG PRS WND THER DME>50 SQCM: CPT

## 2018-04-09 PROCEDURE — 74011000258 HC RX REV CODE- 258: Performed by: SURGERY

## 2018-04-09 PROCEDURE — 36415 COLL VENOUS BLD VENIPUNCTURE: CPT | Performed by: NURSE PRACTITIONER

## 2018-04-09 PROCEDURE — 65270000029 HC RM PRIVATE

## 2018-04-09 PROCEDURE — 85027 COMPLETE CBC AUTOMATED: CPT | Performed by: NURSE PRACTITIONER

## 2018-04-09 PROCEDURE — 74011636637 HC RX REV CODE- 636/637: Performed by: SURGERY

## 2018-04-09 PROCEDURE — 3331090001 HH PPS REVENUE CREDIT

## 2018-04-09 PROCEDURE — 77030010520

## 2018-04-09 PROCEDURE — 74011000250 HC RX REV CODE- 250: Performed by: SURGERY

## 2018-04-09 PROCEDURE — 80048 BASIC METABOLIC PNL TOTAL CA: CPT | Performed by: NURSE PRACTITIONER

## 2018-04-09 PROCEDURE — 74011636320 HC RX REV CODE- 636/320: Performed by: RADIOLOGY

## 2018-04-09 PROCEDURE — 74011250637 HC RX REV CODE- 250/637: Performed by: PHYSICAL MEDICINE & REHABILITATION

## 2018-04-09 PROCEDURE — 97530 THERAPEUTIC ACTIVITIES: CPT

## 2018-04-09 PROCEDURE — 82962 GLUCOSE BLOOD TEST: CPT

## 2018-04-09 PROCEDURE — 77030020847 HC STATLOK BARD -A

## 2018-04-09 PROCEDURE — 74011250637 HC RX REV CODE- 250/637: Performed by: INTERNAL MEDICINE

## 2018-04-09 PROCEDURE — 77030010522

## 2018-04-09 PROCEDURE — 74245 XR UPPER GI/SMALL BOWEL: CPT

## 2018-04-09 PROCEDURE — 77030011641 HC PASTE OST ADH BMS -A

## 2018-04-09 PROCEDURE — 74011250636 HC RX REV CODE- 250/636: Performed by: SURGERY

## 2018-04-09 PROCEDURE — 3331090002 HH PPS REVENUE DEBIT

## 2018-04-09 PROCEDURE — 74011250637 HC RX REV CODE- 250/637: Performed by: PHYSICIAN ASSISTANT

## 2018-04-09 PROCEDURE — 74011250636 HC RX REV CODE- 250/636: Performed by: PHYSICAL MEDICINE & REHABILITATION

## 2018-04-09 PROCEDURE — 77030011315 HC GEL TOP CADEXMR S&N -B

## 2018-04-09 PROCEDURE — 77030018717 HC DRSG GRNUFM KCON -B

## 2018-04-09 RX ADMIN — Medication 10 ML: at 06:14

## 2018-04-09 RX ADMIN — MORPHINE SULFATE 100 MG: 100 TABLET, FILM COATED, EXTENDED RELEASE ORAL at 16:00

## 2018-04-09 RX ADMIN — Medication 10 ML: at 06:00

## 2018-04-09 RX ADMIN — MORPHINE SULFATE 5 MG: 4 INJECTION, SOLUTION INTRAMUSCULAR; INTRAVENOUS at 06:13

## 2018-04-09 RX ADMIN — NYSTATIN: 100000 CREAM TOPICAL at 17:09

## 2018-04-09 RX ADMIN — Medication 10 ML: at 13:08

## 2018-04-09 RX ADMIN — MORPHINE SULFATE 45 MG: 30 TABLET ORAL at 03:05

## 2018-04-09 RX ADMIN — Medication 10 ML: at 08:53

## 2018-04-09 RX ADMIN — MORPHINE SULFATE 45 MG: 30 TABLET ORAL at 12:11

## 2018-04-09 RX ADMIN — Medication 10 ML: at 08:54

## 2018-04-09 RX ADMIN — MORPHINE SULFATE 45 MG: 30 TABLET ORAL at 00:36

## 2018-04-09 RX ADMIN — MORPHINE SULFATE 45 MG: 30 TABLET ORAL at 20:27

## 2018-04-09 RX ADMIN — MORPHINE SULFATE 5 MG: 4 INJECTION, SOLUTION INTRAMUSCULAR; INTRAVENOUS at 13:57

## 2018-04-09 RX ADMIN — Medication 10 ML: at 21:22

## 2018-04-09 RX ADMIN — NYSTATIN: 100000 CREAM TOPICAL at 08:53

## 2018-04-09 RX ADMIN — ONDANSETRON HYDROCHLORIDE 4 MG: 2 INJECTION INTRAMUSCULAR; INTRAVENOUS at 08:43

## 2018-04-09 RX ADMIN — MORPHINE SULFATE 45 MG: 30 TABLET ORAL at 14:36

## 2018-04-09 RX ADMIN — ASCORBIC ACID: 500 INJECTION, SOLUTION INTRAMUSCULAR; INTRAVENOUS; SUBCUTANEOUS at 19:02

## 2018-04-09 RX ADMIN — MORPHINE SULFATE 5 MG: 4 INJECTION, SOLUTION INTRAMUSCULAR; INTRAVENOUS at 11:24

## 2018-04-09 RX ADMIN — PROCHLORPERAZINE EDISYLATE 5 MG: 5 INJECTION INTRAMUSCULAR; INTRAVENOUS at 11:20

## 2018-04-09 RX ADMIN — IOHEXOL 200 ML: 350 INJECTION, SOLUTION INTRAVENOUS at 09:00

## 2018-04-09 RX ADMIN — MORPHINE SULFATE 45 MG: 30 TABLET ORAL at 17:08

## 2018-04-09 RX ADMIN — DRONABINOL 2.5 MG: 2.5 CAPSULE ORAL at 17:08

## 2018-04-09 RX ADMIN — MORPHINE SULFATE 5 MG: 4 INJECTION, SOLUTION INTRAMUSCULAR; INTRAVENOUS at 21:21

## 2018-04-09 RX ADMIN — I.V. FAT EMULSION 250 ML: 20 EMULSION INTRAVENOUS at 19:05

## 2018-04-09 RX ADMIN — MORPHINE SULFATE 5 MG: 4 INJECTION, SOLUTION INTRAMUSCULAR; INTRAVENOUS at 08:44

## 2018-04-09 NOTE — PROGRESS NOTES
Problem: Falls - Risk of  Goal: *Absence of Falls  Document Marlene Fall Risk and appropriate interventions in the flowsheet. Outcome: Progressing Towards Goal  Fall Risk Interventions:  Mobility Interventions: Patient to call before getting OOB         Medication Interventions: Evaluate medications/consider consulting pharmacy    Elimination Interventions: Call light in reach    History of Falls Interventions: Evaluate medications/consider consulting pharmacy        Problem: Small Bowel Obstruction: Day 1  Goal: Medications  Received patient from previous RN via bedside shift report. Patient is resting comfortably; A&Ox4. AVSS, NAD noted at this time. Full assessment into epic. All safety precautions in place- safety maintained. Will continue to monitor. 0900- Pt transported for procedure this AM. Pt has been NPO. Wound vac in place running per orders. Ostomy bag in place- wound to abd CDI. Pt aware of POC- verbalized understanding. Will monitor. 1205- Wound care RN in room changing wound vac and wounds. Pt resting quietly at this time.

## 2018-04-09 NOTE — PROGRESS NOTES
Progress Note    Patient: Lidya Beckett MRN: [id number]  SSN: [id number]    YOB: 1975  Age: 38 y.o. Sex: female      Admit Date: 3/7/2018    33 Days Post-Op    Procedure:  Procedure(s):  EXPLORATORY LAPAROTOMY, LYSIS OF ADHESION X 5HOURS, SMALL BOWEL FISTULA TAKE DOWN X 2 AND WOUND VAC PLACEMENT    Subjective:     No acute surgical issues. Pt getting upper GI study. Fistula is still with copious output. Pain is under control. Hemoglobin is stable.        Objective:     Visit Vitals    /77    Pulse (!) 110    Temp 99 °F (37.2 °C)    Resp 18    Ht 5' 4\" (1.626 m)    Wt (!) 355 lb 2.6 oz (161.1 kg)    SpO2 100%    BMI 60.96 kg/m2       Temp (24hrs), Av.6 °F (37 °C), Min:97.9 °F (36.6 °C), Max:99 °F (37.2 °C)        Physical Exam:    Gen:  NAD  Pulm:  Unlabored  Abd:  S/ND/appropriate TTP  Wound:  C/D/I  Ostomy:  Pink, patent and productive    Recent Results (from the past 24 hour(s))   GLUCOSE, POC    Collection Time: 18  7:16 PM   Result Value Ref Range    Glucose (POC) 120 (H) 65 - 100 mg/dL    Performed by Peggy Romero    CBC W/O DIFF    Collection Time: 18  3:06 AM   Result Value Ref Range    WBC 11.6 (H) 3.6 - 11.0 K/uL    RBC 2.64 (L) 3.80 - 5.20 M/uL    HGB 7.9 (L) 11.5 - 16.0 g/dL    HCT 25.8 (L) 35.0 - 47.0 %    MCV 97.7 80.0 - 99.0 FL    MCH 29.9 26.0 - 34.0 PG    MCHC 30.6 30.0 - 36.5 g/dL    RDW 19.2 (H) 11.5 - 14.5 %    PLATELET 835 (H) 995 - 400 K/uL    MPV 9.5 8.9 - 12.9 FL    NRBC 0.3 (H) 0  WBC    ABSOLUTE NRBC 0.04 (H) 0.00 - 3.56 K/uL   METABOLIC PANEL, BASIC    Collection Time: 18  3:06 AM   Result Value Ref Range    Sodium 135 (L) 136 - 145 mmol/L    Potassium 4.9 3.5 - 5.1 mmol/L    Chloride 104 97 - 108 mmol/L    CO2 24 21 - 32 mmol/L    Anion gap 7 5 - 15 mmol/L    Glucose 199 (H) 65 - 100 mg/dL    BUN 25 (H) 6 - 20 MG/DL    Creatinine 0.60 0.55 - 1.02 MG/DL    BUN/Creatinine ratio 42 (H) 12 - 20      GFR est AA >60 >60 ml/min/1.73m2    GFR est non-AA >60 >60 ml/min/1.73m2    Calcium 9.3 8.5 - 10.1 MG/DL   GLUCOSE, POC    Collection Time: 04/09/18  6:20 AM   Result Value Ref Range    Glucose (POC) 130 (H) 65 - 100 mg/dL    Performed by Robbie Ta            Assessment:     Hospital Problems  Date Reviewed: 10/27/2017          Codes Class Noted POA    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   Will continue to monitor  - Pain control  - Out of bed as tolerated  - Upper GI with small bowel follow-through   - Renew TPN    Signed By: Lawson Bernard MD     April 9, 2018

## 2018-04-09 NOTE — PROGRESS NOTES
Note prior assessment and work completed by multiple colleagues, most recently [employer], ROBERT/CRM. Spoke with Gabby Chu, liaison for [employer], N#[phone]. Amari Collier is still following from [facility]. They have concerns about the long term plan for tube feedings vs. TPN, as they would likely not be able to accept her with TPN. Note that she also has a wound vac. [facility] would like to see updated PT/OT notes when available. Care Management will continue to follow her disposition.    ROBERT Lemus

## 2018-04-09 NOTE — PROGRESS NOTES
Physical Therapy    Reviewed chart. Noted Pt is currently off the floor. Will defer at this time and continue to follow.

## 2018-04-09 NOTE — PROGRESS NOTES
Problem: Mobility Impaired (Adult and Pediatric)  Goal: *Acute Goals and Plan of Care (Insert Text)  Physical Therapy Goals  Update 4/6/2018  1. Patient will move from supine to sit and sit to supine  in bed with moderate assistance  within 7 day(s). 2.  Patient will transfer from bed to chair and chair to bed with moderate assistance  using the least restrictive device within 7 day(s). 3.  Patient will perform sit to stand with CGA within 7 days  4. Patient will ambulate 22' with moderate assist x 1 using LRAD within 7 days. Revised 3/27/2018  1. Patient will move from supine to sit and sit to supine  in bed with moderate assistance  within 7 day(s). 2.  Patient will transfer from bed to chair and chair to bed with moderate assistance  using the least restrictive device within 7 day(s). 3.  Patient will perform sit to stand with moderate assistance  within 7 day(s). 4.  Patient will ambulate 100' with moderate assist x1 using the least restrictive device within 7 day(s). Revisited 3/19/2018, goals remain appropriate, carry over    Physical Therapy Goals  Initiated 3/8/2018  1. Patient will move from supine to sit and sit to supine  in bed with moderate assistance  within 7 day(s). 2.  Patient will transfer from bed to chair and chair to bed with moderate assistance  using the least restrictive device within 7 day(s). 3.  Patient will perform sit to stand with moderate assistance  within 7 day(s). 4.  PT will assess gait with the least restrictive device within 7 day(s). physical Therapy TREATMENT  Patient:  Jayson Shook (38 y.o. female)  Date: 4/9/2018  Diagnosis: FISTULA  Small bowel fistula <principal problem not specified>  Procedure(s) (LRB):  EXPLORATORY LAPAROTOMY, LYSIS OF ADHESION X 5HOURS, SMALL BOWEL FISTULA TAKE DOWN X 2 AND WOUND VAC PLACEMENT (N/A) 33 Days Post-Op  Precautions: Fall, Skin  Chart, physical therapy assessment, plan of care and goals were reviewed. ASSESSMENT:  Pt reports abdominal pain from earlier procedure. Pt required encouragement to attempt OOB. Pt was able to mobilize with assistance of on and transfer to stretcher chair. Pt reports left ankle pain with upright mobility. Will obtain a bariatric rolling walker to progress gait. Pt may benefit from inpt rehab to progress mobility. Progression toward goals:  [x]    Improving appropriately and progressing toward goals  []    Improving slowly and progressing toward goals  []    Not making progress toward goals and plan of care will be adjusted     PLAN:  Patient continues to benefit from skilled intervention to address the above impairments. Continue treatment per established plan of care. Discharge Recommendations:  Inpatient Rehab  Further Equipment Recommendations for Discharge:  TBD     SUBJECTIVE:   Patient stated my ankle hurts.     OBJECTIVE DATA SUMMARY:   Critical Behavior:  Neurologic State: Alert  Orientation Level: Oriented X4  Cognition: Appropriate decision making     Functional Mobility Training:  Bed Mobility:     Supine to Sit: Moderate assistance              Transfers:  Sit to Stand: Minimum assistance  Stand to Sit: Minimum assistance        Bed to Chair: Minimum assistance                    Balance:  Sitting: Intact  Standing: Impaired  Standing - Static: Good  Standing - Dynamic : Fair  Ambulation/Gait Training:                                                        Stairs:              Neuro Re-Education:    Therapeutic Exercises:     Pain:  Pain Scale 1: Numeric (0 - 10)  Pain Intensity 1: 8  Pain Location 1: Abdomen  Pain Orientation 1: Anterior  Pain Description 1: Throbbing  Pain Intervention(s) 1: Medication (see MAR)  Activity Tolerance:   Limited   Please refer to the flowsheet for vital signs taken during this treatment.   After treatment:   [x]    Patient left in no apparent distress sitting up in chair  []    Patient left in no apparent distress in bed  [x] Call bell left within reach  [x]    Nursing notified  []    Caregiver present  []    Bed alarm activated    COMMUNICATION/COLLABORATION:   The patients plan of care was discussed with: Registered Nurse    Norman Devine PTA   Time Calculation: 20 mins

## 2018-04-09 NOTE — PROGRESS NOTES
Radiology-UGI/SBFT marginal due to inability of the patient to cooperate. Attempted sinogram unsuccessful due to inability to cannulate tract. -CECELIA.

## 2018-04-09 NOTE — PROGRESS NOTES
Bedside shift change report given to [facility] and MAURI Prather (oncoming nurse) by Madhu Davis (offgoing nurse). Report included the following information SBAR, Kardex, Intake/Output, MAR and Recent Results.

## 2018-04-09 NOTE — WOUND CARE
WOCN Note:     Follow-up visit for [facility] dressing change and fistula pouching. Chart shows:  Admitted for fistula takdown 3/7/18 by Dr. Brent Serrano with [facility] placement in [address] [place] [address]; history of multiple abdominal surgeries and Crohns disease. Admitted from home. Mother able to provide assistance in pouching prior to admission and became very competent in doing so.      Assessment:   Patient is A&O x 4, continent and mobile - moves around room with assistance.     Bed: total care bariatric  Pre-medicated by RN.       1. Midline abdominal surgical wound = 16 x 12.5 x 3.3 cm (smaller)  95% bright red & granular wound base and 5% stomatized mucus membrane in two areas close to each other centrally with peristalsis. No catheter remains in stoma. (She had a SBFT this morning)  1 piece of black sponge with central hole to allow for pouching of the fistulas; 2 Hawk's rings placed around hole top and bottom to achieve seal, paste added.  After seal achieved, a convex pouch was placed to collect fistula output. 50 mmHg continuous suction.       Goal is to find a system for use at home until wound is small enough to pouch (we are getting closer to a pouchable size)      Colostomy: stoma is red & moist; small amount of mucoid tan output; peristomal skin intact with mucosal transplantation noted; 2-piece flat pouch in use.     Wound Recommendations:    Continue NPWT as ordered @ 50 mmHg using black sponge and change Monday, Wednesday, & Friday. Skin Care & Pressure Relief Recommendations:  Minimize layers of linen/pads under patient to optimize support surface. Turn/reposition approximately every 2 hours and offload heels. Promote continence    Discussed above plan with patient & RN. Transition of Care: Plan to follow as needed while admitted to hospital with [facility] dressing changes on Monday, Wednesday, & Friday.   Unsure if she will need home VAC or will transition to pouching only at home.      YOLANDA OrellanaN, RN, [facility] [place]  Certified Wound, Ostomy, Continence Nurse  office [phone]  pager [phone] or call  to page

## 2018-04-10 LAB
GLUCOSE BLD STRIP.AUTO-MCNC: 111 MG/DL (ref 65–100)
GLUCOSE BLD STRIP.AUTO-MCNC: 153 MG/DL (ref 65–100)
GLUCOSE BLD STRIP.AUTO-MCNC: 238 MG/DL (ref 65–100)
SERVICE CMNT-IMP: ABNORMAL

## 2018-04-10 PROCEDURE — 97110 THERAPEUTIC EXERCISES: CPT

## 2018-04-10 PROCEDURE — 74011250637 HC RX REV CODE- 250/637: Performed by: INTERNAL MEDICINE

## 2018-04-10 PROCEDURE — 74011250636 HC RX REV CODE- 250/636: Performed by: SURGERY

## 2018-04-10 PROCEDURE — 74011250637 HC RX REV CODE- 250/637: Performed by: PHYSICAL MEDICINE & REHABILITATION

## 2018-04-10 PROCEDURE — 74011250637 HC RX REV CODE- 250/637: Performed by: NURSE PRACTITIONER

## 2018-04-10 PROCEDURE — 74011250636 HC RX REV CODE- 250/636: Performed by: PHYSICAL MEDICINE & REHABILITATION

## 2018-04-10 PROCEDURE — 74011000250 HC RX REV CODE- 250: Performed by: SURGERY

## 2018-04-10 PROCEDURE — 74011636637 HC RX REV CODE- 636/637: Performed by: SURGERY

## 2018-04-10 PROCEDURE — 74011250636 HC RX REV CODE- 250/636: Performed by: NURSE PRACTITIONER

## 2018-04-10 PROCEDURE — 97530 THERAPEUTIC ACTIVITIES: CPT

## 2018-04-10 PROCEDURE — 74011636637 HC RX REV CODE- 636/637: Performed by: NURSE PRACTITIONER

## 2018-04-10 PROCEDURE — 65270000029 HC RM PRIVATE

## 2018-04-10 PROCEDURE — 74011000258 HC RX REV CODE- 258: Performed by: SURGERY

## 2018-04-10 PROCEDURE — 97535 SELF CARE MNGMENT TRAINING: CPT

## 2018-04-10 PROCEDURE — 82962 GLUCOSE BLOOD TEST: CPT

## 2018-04-10 PROCEDURE — 97116 GAIT TRAINING THERAPY: CPT

## 2018-04-10 PROCEDURE — 3331090001 HH PPS REVENUE CREDIT

## 2018-04-10 PROCEDURE — 74011250637 HC RX REV CODE- 250/637: Performed by: PHYSICIAN ASSISTANT

## 2018-04-10 PROCEDURE — 3331090002 HH PPS REVENUE DEBIT

## 2018-04-10 RX ADMIN — MORPHINE SULFATE 5 MG: 4 INJECTION, SOLUTION INTRAMUSCULAR; INTRAVENOUS at 05:42

## 2018-04-10 RX ADMIN — Medication 10 ML: at 13:30

## 2018-04-10 RX ADMIN — ASPIRIN 325 MG: 325 TABLET ORAL at 08:17

## 2018-04-10 RX ADMIN — MORPHINE SULFATE 115 MG: 15 TABLET, EXTENDED RELEASE ORAL at 00:18

## 2018-04-10 RX ADMIN — MORPHINE SULFATE 5 MG: 4 INJECTION, SOLUTION INTRAMUSCULAR; INTRAVENOUS at 20:07

## 2018-04-10 RX ADMIN — Medication 10 ML: at 06:00

## 2018-04-10 RX ADMIN — MORPHINE SULFATE 5 MG: 4 INJECTION, SOLUTION INTRAMUSCULAR; INTRAVENOUS at 09:08

## 2018-04-10 RX ADMIN — MORPHINE SULFATE 45 MG: 30 TABLET ORAL at 08:24

## 2018-04-10 RX ADMIN — INSULIN LISPRO 4 UNITS: 100 INJECTION, SOLUTION INTRAVENOUS; SUBCUTANEOUS at 07:35

## 2018-04-10 RX ADMIN — MORPHINE SULFATE 45 MG: 30 TABLET ORAL at 14:15

## 2018-04-10 RX ADMIN — Medication 10 ML: at 22:08

## 2018-04-10 RX ADMIN — Medication 10 ML: at 09:05

## 2018-04-10 RX ADMIN — ONDANSETRON HYDROCHLORIDE 4 MG: 2 INJECTION INTRAMUSCULAR; INTRAVENOUS at 13:28

## 2018-04-10 RX ADMIN — MORPHINE SULFATE 45 MG: 30 TABLET ORAL at 06:38

## 2018-04-10 RX ADMIN — ENOXAPARIN SODIUM 40 MG: 100 INJECTION SUBCUTANEOUS at 09:06

## 2018-04-10 RX ADMIN — MORPHINE SULFATE 45 MG: 30 TABLET ORAL at 21:26

## 2018-04-10 RX ADMIN — ONDANSETRON HYDROCHLORIDE 4 MG: 2 INJECTION INTRAMUSCULAR; INTRAVENOUS at 01:18

## 2018-04-10 RX ADMIN — ASCORBIC ACID: 500 INJECTION, SOLUTION INTRAMUSCULAR; INTRAVENOUS; SUBCUTANEOUS at 19:55

## 2018-04-10 RX ADMIN — MORPHINE SULFATE 45 MG: 30 TABLET ORAL at 11:19

## 2018-04-10 RX ADMIN — MORPHINE SULFATE 5 MG: 4 INJECTION, SOLUTION INTRAMUSCULAR; INTRAVENOUS at 14:50

## 2018-04-10 RX ADMIN — DRONABINOL 2.5 MG: 2.5 CAPSULE ORAL at 17:50

## 2018-04-10 RX ADMIN — MORPHINE SULFATE 45 MG: 30 TABLET ORAL at 03:11

## 2018-04-10 RX ADMIN — MORPHINE SULFATE 5 MG: 4 INJECTION, SOLUTION INTRAMUSCULAR; INTRAVENOUS at 22:08

## 2018-04-10 RX ADMIN — ASCORBIC ACID: 500 INJECTION, SOLUTION INTRAMUSCULAR; INTRAVENOUS; SUBCUTANEOUS at 18:41

## 2018-04-10 RX ADMIN — MORPHINE SULFATE 45 MG: 30 TABLET ORAL at 17:50

## 2018-04-10 RX ADMIN — Medication 10 ML: at 00:19

## 2018-04-10 RX ADMIN — MORPHINE SULFATE 100 MG: 100 TABLET, FILM COATED, EXTENDED RELEASE ORAL at 16:05

## 2018-04-10 RX ADMIN — WATER 1 MG: 1 INJECTION INTRAMUSCULAR; INTRAVENOUS; SUBCUTANEOUS at 22:39

## 2018-04-10 RX ADMIN — MORPHINE SULFATE 115 MG: 15 TABLET, EXTENDED RELEASE ORAL at 23:54

## 2018-04-10 RX ADMIN — Medication 10 ML: at 20:07

## 2018-04-10 RX ADMIN — MORPHINE SULFATE 45 MG: 30 TABLET ORAL at 00:17

## 2018-04-10 RX ADMIN — MORPHINE SULFATE 100 MG: 100 TABLET, FILM COATED, EXTENDED RELEASE ORAL at 08:17

## 2018-04-10 RX ADMIN — DRONABINOL 2.5 MG: 2.5 CAPSULE ORAL at 08:17

## 2018-04-10 RX ADMIN — Medication 10 ML: at 09:06

## 2018-04-10 NOTE — PROGRESS NOTES
Bedside and Verbal shift change report given to [address] (oncoming nurse) by Catarino Schumacher RN (offgoing nurse). Report included the following information SBAR, Kardex, OR Summary, Procedure Summary, Intake/Output, MAR, Accordion, Recent Results and Med Rec Status.

## 2018-04-10 NOTE — PROGRESS NOTES
Progress Note    Patient: Zuleyma Xie MRN: [id number]  SSN: [id number]    YOB: 1975  Age: 38 y.o. Sex: female      Admit Date: 3/7/2018    34 Days Post-Op    Procedure:  Procedure(s):  EXPLORATORY LAPAROTOMY, LYSIS OF ADHESION X 5HOURS, SMALL BOWEL FISTULA TAKE DOWN X 2 AND WOUND VAC PLACEMENT    Subjective:     No acute surgical issues. Pt unable to tolerate upper GI study yesterday. Doing okay today. Tolerating diet. Fistula is under control. Hemoglobin is stable. Objective:     Visit Vitals    /57 (BP Patient Position: Sitting)    Pulse (!) 124    Temp 98.3 °F (36.8 °C)    Resp 18    Ht 5' 4\" (1.626 m)    Wt 349 lb 0.5 oz (158.3 kg)    SpO2 99%    BMI 59.91 kg/m2       Temp (24hrs), Av.2 °F (36.8 °C), Min:97.7 °F (36.5 °C), Max:98.5 °F (36.9 °C)        Physical Exam:    Gen:  NAD  Pulm:  Unlabored  Abd:  S/ND/appropriate TTP  Wound:  C/D/I  Ostomy:  Pink, patent and productive    Recent Results (from the past 24 hour(s))   GLUCOSE, POC    Collection Time: 18  7:34 PM   Result Value Ref Range    Glucose (POC) 140 (H) 65 - 100 mg/dL    Performed by Sanjuana Chavez    GLUCOSE, POC    Collection Time: 04/10/18  6:47 AM   Result Value Ref Range    Glucose (POC) 238 (H) 65 - 100 mg/dL    Performed by Nida Vicente (PCT)    GLUCOSE, POC    Collection Time: 04/10/18 11:31 AM   Result Value Ref Range    Glucose (POC) 153 (H) 65 - 100 mg/dL    Performed by Silvia Trujillo            Assessment:     Hospital Problems  Date Reviewed: 10/27/2017          Codes Class Noted POA    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 Will continue to monitor  - Pain control  - Out of bed as tolerated  - Repeat CT scan tomorrow.   Possible Feeding tube placement in OR on Thursday.  - Renew TPN    Signed By: Bran Babb MD April 10, 2018

## 2018-04-10 NOTE — PROGRESS NOTES
Received patient from previous RN via bedside shift report. Patient is resting comfortably; A&Ox4. AVSS, NAD noted at this time. Full assessment into epic. All safety precautions in place- safety maintained. Wound vac in place running per orders. Ostomy bag in place- wound to abd CDI. Pt aware of POC- verbalized understanding. Will monitor.     Pt turned q2h and prepositioned as needed. Pt reports not feeling as well as yesterday. She reports, \"I think I pulled a muscle. \" Pt encouraged to increase mobility. PT/OT consulted as well.     1600- Pt up in chair- well tolerated. Pt worked with PT and OT today. Will monitor.

## 2018-04-10 NOTE — PROGRESS NOTES
Problem: Self Care Deficits Care Plan (Adult)  Goal: *Acute Goals and Plan of Care (Insert Text)  Occupational Therapy Goals  Reevaluated 4/10/2018  1. Patient will complete grooming activity sitting EOB without support for 10 minutes within 7 days. 2.  Patient will complete BSC transfer with min A x 2 persons within 7 days. 3.  Patient will complete lower body dressing activities with min A within 7 days. 4.  Patient will perform standing for clothing management tasks (toileting or dressing) with min A x 2 persons within 7 days. 5.  Patient will complete UE exercise program independently within 7 days. Initiated 4/4/2018  1. Patient will perform rolling and pulling self up to head of bed with min assist to assist with ADL's at bed level within 7 day(s). 2.  Patient will perform static sitting edge of bed > or = 10 minutes with supervision/set-up within 7 day(s). 3.  Patient will perform static standing with bilateral UE support on rolling walker > or = 3 minutes with minimal assistance/contact guard assist x's 2 within 7 day(s). 4.  Patient will perform toilet transfers with moderate assistance x's 2 to bedside commode within 7 days. 5.  Patient will perform bilateral UE AROM and strengthening exercises throughout day and grade exercises prn to increase demand within 7 day(s). Occupational Therapy TREATMENT: WEEKLY REASSESSMENT  Patient: Silvia Gibson (38 y.o. female)  Date: 4/10/2018  Diagnosis: FISTULA  Small bowel fistula <principal problem not specified>  Procedure(s) (LRB):  EXPLORATORY LAPAROTOMY, LYSIS OF ADHESION X 5HOURS, SMALL BOWEL FISTULA TAKE DOWN X 2 AND WOUND VAC PLACEMENT (N/A) 34 Days Post-Op  Precautions: Fall, Skin  Chart, occupational therapy assessment, plan of care, and goals were reviewed. ASSESSMENT:  Pt received sitting in chair position in bed and eager to work with OT.   Pt demonstrating progress and overall activity tolerance but continues to demonstrate self limiting behaviors at times. She worked on dressing from sitting up in chair position in bed. Pt did well with tasks and was able to kick both legs off the bed to work on dressing activities. Complete weekly reassessment today to address goals and continued recovery. Pt noted with some self limiting in regards to activities she completes (i.e. Declining toileting activities as she does not transfer to [facility]). Pt encouraged this would demonstrate progression of activity and independence with self care tasks. Pt provided with UE moderate resistance theraband and modified with handles. Progression toward goals:  [x]            Improving appropriately and progressing toward goals  []            Improving slowly and progressing toward goals  []            Not making progress toward goals and plan of care will be adjusted     PLAN:  Goals have been updated based on progression since last assessment. Patient continues to benefit from skilled intervention to address the above impairments. Continue to follow patient 5 times a week to address goals. Planned Interventions:  [x]                    Self Care Training                  [x]             Therapeutic Activities  [x]                    Functional Mobility Training    []             Cognitive Retraining  [x]                    Therapeutic Exercises           [x]             Endurance Activities  [x]                    Balance Training                   []             Neuromuscular Re-Education  []                    Visual/Perceptual Training     [x]        Home Safety Training  [x]                    Patient Education                 [x]             Family Training/Education  []                    Other (comment):  Discharge Recommendations: Inpatient Rehab  Further Equipment Recommendations for Discharge: TBD     SUBJECTIVE:   Patient stated I can't sit on the commode because my legs are so swollen.   Pt with complaints of LE swelling and difficulty sitting on BSC. Pt does have platform bariatric commode in room and discussed need to progress this activity as she continues to recover and improve overall. OBJECTIVE DATA SUMMARY:   Cognitive/Behavioral Status:  Neurologic State: Alert  Orientation Level: Oriented X4  Cognition: Appropriate for age attention/concentration  Perception: Appears intact  Perseveration: No perseveration noted  Safety/Judgement: Good awareness of safety precautions    Functional Mobility and Transfers for ADLs:  Bed Mobility:   Remained with bed in chair position      Balance:       ADL Intervention:       Grooming  Grooming Assistance: Supervision/set up                   Lower Body Dressing Assistance  Dressing Assistance: Moderate assistance (from bed in chair position)  Underpants: Maximum assistance  Socks: Moderate assistance  Leg Crossed Method Used: No  Position Performed:  (bed in chair position)  Cues: Verbal cues provided  Adaptive Equipment Used: Dressing stick; Sock aid;Reacher     Theraband exercises with moderate resistance and overall did well with modified theraband. Cognitive Retraining  Safety/Judgement: Good awareness of safety precautions    Pain:  Pain Scale 1: Numeric (0 - 10)  Pain Intensity 1: 7  Pain Location 1: Abdomen  Pain Orientation 1: Anterior  Pain Description 1: Throbbing  Pain Intervention(s) 1: Medication (see MAR); Distraction;Repositioned  Activity Tolerance:   VSS throughout session.      After treatment:   [x] Patient left in no apparent distress sitting up in chair  [] Patient left in no apparent distress in bed  [x] Call bell left within reach  [x] Nursing notified  [] Caregiver present  [] Bed alarm activated    COMMUNICATION/COLLABORATION:   The patients plan of care was discussed with: Physical Therapist and Registered Nurse    Treva Garcia OT  Time Calculation: 47 mins

## 2018-04-10 NOTE — PROGRESS NOTES
Problem: Mobility Impaired (Adult and Pediatric)  Goal: *Acute Goals and Plan of Care (Insert Text)  Physical Therapy Goals  Update 4/6/2018  1. Patient will move from supine to sit and sit to supine  in bed with moderate assistance  within 7 day(s). 2.  Patient will transfer from bed to chair and chair to bed with moderate assistance  using the least restrictive device within 7 day(s). 3.  Patient will perform sit to stand with CGA within 7 days  4. Patient will ambulate 22' with moderate assist x 1 using LRAD within 7 days. Revised 3/27/2018  1. Patient will move from supine to sit and sit to supine  in bed with moderate assistance  within 7 day(s). 2.  Patient will transfer from bed to chair and chair to bed with moderate assistance  using the least restrictive device within 7 day(s). 3.  Patient will perform sit to stand with moderate assistance  within 7 day(s). 4.  Patient will ambulate 100' with moderate assist x1 using the least restrictive device within 7 day(s). Revisited 3/19/2018, goals remain appropriate, carry over    Physical Therapy Goals  Initiated 3/8/2018  1. Patient will move from supine to sit and sit to supine  in bed with moderate assistance  within 7 day(s). 2.  Patient will transfer from bed to chair and chair to bed with moderate assistance  using the least restrictive device within 7 day(s). 3.  Patient will perform sit to stand with moderate assistance  within 7 day(s). 4.  PT will assess gait with the least restrictive device within 7 day(s). physical Therapy TREATMENT  Patient:  Acacia Almeida (38 y.o. female)  Date: 4/10/2018  Diagnosis: FISTULA  Small bowel fistula <principal problem not specified>  Procedure(s) (LRB):  EXPLORATORY LAPAROTOMY, LYSIS OF ADHESION X 5HOURS, SMALL BOWEL FISTULA TAKE DOWN X 2 AND WOUND VAC PLACEMENT (N/A) 34 Days Post-Op  Precautions: Fall, Skin  Chart, physical therapy assessment, plan of care and goals were reviewed. ASSESSMENT:  Pt reports abdominal strain from yesterday, so utilized chair position to stand. Pt was able to increase gait tolerance. Pt reports left ankle pain but was able to weightbear through LE. Pt had IV pain medicine prior to gait. Pt could benefit from inpt rehab at discharge to progress. Progression toward goals:  [x]    Improving appropriately and progressing toward goals  []    Improving slowly and progressing toward goals  []    Not making progress toward goals and plan of care will be adjusted     PLAN:  Patient continues to benefit from skilled intervention to address the above impairments. Continue treatment per established plan of care. Discharge Recommendations:  Inpatient Rehab  Further Equipment Recommendations for Discharge:  TBD     SUBJECTIVE:   Patient stated Berny Reveles can try to walk further.     OBJECTIVE DATA SUMMARY:   Critical Behavior:  Neurologic State: Alert  Orientation Level: Oriented X4  Cognition: Appropriate for age attention/concentration  Safety/Judgement: Good awareness of safety precautions  Functional Mobility Training:  Bed Mobility:                    Transfers:  Sit to Stand: Minimum assistance  Stand to Sit: Minimum assistance                             Balance:  Sitting: Intact  Standing: Impaired  Standing - Static: Good  Standing - Dynamic : Fair  Ambulation/Gait Training:  Distance (ft): 15 Feet (ft)  Assistive Device: Walker, rolling  Ambulation - Level of Assistance: Minimal assistance        Gait Abnormalities: Decreased step clearance        Base of Support: Widened     Speed/Liz: Slow  Step Length: Left shortened;Right shortened                   Stairs:              Neuro Re-Education:    Therapeutic Exercises:     Pain:  Pain Scale 1: Numeric (0 - 10)  Pain Intensity 1: 8  Pain Location 1: Abdomen  Pain Orientation 1: Anterior  Pain Description 1: Throbbing  Pain Intervention(s) 1: Medication (see MAR); Repositioned  Activity Tolerance:   limited Please refer to the flowsheet for vital signs taken during this treatment.   After treatment:   [x]    Patient left in no apparent distress sitting up in chair  []    Patient left in no apparent distress in bed  [x]    Call bell left within reach  [x]    Nursing notified  []    Caregiver present  []    Bed alarm activated    COMMUNICATION/COLLABORATION:   The patients plan of care was discussed with: Registered Nurse    Natalie Gonzalez PTA   Time Calculation: 27 mins

## 2018-04-10 NOTE — PROGRESS NOTES
Follow up visit with Ms. Kwong. Offered listening presence as pt reflected on her hospitalization. She shared some concerns and well as some hopes. Emotional and spiritual support offered. Pt experienced the death of a beloved pet during this hospitalization and expressed some of her grief related to that loss. Visit ended as pt received a phone call. Assured her of prayers and of ongoing  support as needed/desired.     Bhumika Bailey, Palliative

## 2018-04-11 ENCOUNTER — APPOINTMENT (OUTPATIENT)
Dept: CT IMAGING | Age: 43
DRG: 330 | End: 2018-04-11
Attending: SURGERY
Payer: MEDICARE

## 2018-04-11 ENCOUNTER — ANESTHESIA EVENT (OUTPATIENT)
Dept: SURGERY | Age: 43
DRG: 330 | End: 2018-04-11
Payer: MEDICARE

## 2018-04-11 LAB
ANION GAP SERPL CALC-SCNC: 8 MMOL/L (ref 5–15)
BUN SERPL-MCNC: 26 MG/DL (ref 6–20)
BUN/CREAT SERPL: 38 (ref 12–20)
CALCIUM SERPL-MCNC: 9.2 MG/DL (ref 8.5–10.1)
CHLORIDE SERPL-SCNC: 102 MMOL/L (ref 97–108)
CO2 SERPL-SCNC: 23 MMOL/L (ref 21–32)
CREAT SERPL-MCNC: 0.69 MG/DL (ref 0.55–1.02)
ERYTHROCYTE [DISTWIDTH] IN BLOOD BY AUTOMATED COUNT: 19 % (ref 11.5–14.5)
GLUCOSE BLD STRIP.AUTO-MCNC: 104 MG/DL (ref 65–100)
GLUCOSE BLD STRIP.AUTO-MCNC: 206 MG/DL (ref 65–100)
GLUCOSE SERPL-MCNC: 201 MG/DL (ref 65–100)
HCT VFR BLD AUTO: 25.5 % (ref 35–47)
HGB BLD-MCNC: 7.9 G/DL (ref 11.5–16)
MCH RBC QN AUTO: 30.2 PG (ref 26–34)
MCHC RBC AUTO-ENTMCNC: 31 G/DL (ref 30–36.5)
MCV RBC AUTO: 97.3 FL (ref 80–99)
NRBC # BLD: 0.02 K/UL (ref 0–0.01)
NRBC BLD-RTO: 0.1 PER 100 WBC
PLATELET # BLD AUTO: 386 K/UL (ref 150–400)
PMV BLD AUTO: 9.4 FL (ref 8.9–12.9)
POTASSIUM SERPL-SCNC: 4.8 MMOL/L (ref 3.5–5.1)
RBC # BLD AUTO: 2.62 M/UL (ref 3.8–5.2)
SERVICE CMNT-IMP: ABNORMAL
SERVICE CMNT-IMP: ABNORMAL
SODIUM SERPL-SCNC: 133 MMOL/L (ref 136–145)
WBC # BLD AUTO: 14.6 K/UL (ref 3.6–11)

## 2018-04-11 PROCEDURE — 74011000258 HC RX REV CODE- 258: Performed by: NURSE PRACTITIONER

## 2018-04-11 PROCEDURE — 97606 NEG PRS WND THER DME>50 SQCM: CPT

## 2018-04-11 PROCEDURE — 3331090002 HH PPS REVENUE DEBIT

## 2018-04-11 PROCEDURE — 3331090001 HH PPS REVENUE CREDIT

## 2018-04-11 PROCEDURE — 74177 CT ABD & PELVIS W/CONTRAST: CPT

## 2018-04-11 PROCEDURE — 74011000250 HC RX REV CODE- 250: Performed by: NURSE PRACTITIONER

## 2018-04-11 PROCEDURE — 74011000258 HC RX REV CODE- 258: Performed by: SURGERY

## 2018-04-11 PROCEDURE — 77030011641 HC PASTE OST ADH BMS -A

## 2018-04-11 PROCEDURE — 80048 BASIC METABOLIC PNL TOTAL CA: CPT | Performed by: NURSE PRACTITIONER

## 2018-04-11 PROCEDURE — 74011250636 HC RX REV CODE- 250/636: Performed by: NURSE PRACTITIONER

## 2018-04-11 PROCEDURE — 74011000250 HC RX REV CODE- 250: Performed by: SURGERY

## 2018-04-11 PROCEDURE — 77030010520

## 2018-04-11 PROCEDURE — 74011636637 HC RX REV CODE- 636/637: Performed by: NURSE PRACTITIONER

## 2018-04-11 PROCEDURE — 74011636637 HC RX REV CODE- 636/637: Performed by: SURGERY

## 2018-04-11 PROCEDURE — 74011250637 HC RX REV CODE- 250/637: Performed by: NURSE PRACTITIONER

## 2018-04-11 PROCEDURE — 74011250637 HC RX REV CODE- 250/637: Performed by: INTERNAL MEDICINE

## 2018-04-11 PROCEDURE — 74011250637 HC RX REV CODE- 250/637: Performed by: PHYSICIAN ASSISTANT

## 2018-04-11 PROCEDURE — 82962 GLUCOSE BLOOD TEST: CPT

## 2018-04-11 PROCEDURE — 97116 GAIT TRAINING THERAPY: CPT

## 2018-04-11 PROCEDURE — 74011250636 HC RX REV CODE- 250/636: Performed by: PHYSICAL MEDICINE & REHABILITATION

## 2018-04-11 PROCEDURE — 74011636320 HC RX REV CODE- 636/320: Performed by: SURGERY

## 2018-04-11 PROCEDURE — 77030018717 HC DRSG GRNUFM KCON -B

## 2018-04-11 PROCEDURE — 65270000029 HC RM PRIVATE

## 2018-04-11 PROCEDURE — 77030019952 HC CANSTR VAC ASST KCON -B

## 2018-04-11 PROCEDURE — 36415 COLL VENOUS BLD VENIPUNCTURE: CPT | Performed by: NURSE PRACTITIONER

## 2018-04-11 PROCEDURE — 74011250636 HC RX REV CODE- 250/636: Performed by: SURGERY

## 2018-04-11 PROCEDURE — 85027 COMPLETE CBC AUTOMATED: CPT | Performed by: NURSE PRACTITIONER

## 2018-04-11 PROCEDURE — 74011250637 HC RX REV CODE- 250/637: Performed by: PHYSICAL MEDICINE & REHABILITATION

## 2018-04-11 RX ORDER — MAGNESIUM SULFATE HEPTAHYDRATE 40 MG/ML
2 INJECTION, SOLUTION INTRAVENOUS ONCE
Status: DISCONTINUED | OUTPATIENT
Start: 2018-04-11 | End: 2018-04-11

## 2018-04-11 RX ORDER — SODIUM CHLORIDE 0.9 % (FLUSH) 0.9 %
10 SYRINGE (ML) INJECTION
Status: COMPLETED | OUTPATIENT
Start: 2018-04-11 | End: 2018-04-11

## 2018-04-11 RX ADMIN — MORPHINE SULFATE 5 MG: 4 INJECTION, SOLUTION INTRAMUSCULAR; INTRAVENOUS at 22:02

## 2018-04-11 RX ADMIN — Medication 10 ML: at 04:45

## 2018-04-11 RX ADMIN — MORPHINE SULFATE 5 MG: 4 INJECTION, SOLUTION INTRAMUSCULAR; INTRAVENOUS at 02:56

## 2018-04-11 RX ADMIN — MORPHINE SULFATE 45 MG: 30 TABLET ORAL at 14:51

## 2018-04-11 RX ADMIN — Medication 10 ML: at 06:30

## 2018-04-11 RX ADMIN — MORPHINE SULFATE 115 MG: 15 TABLET, EXTENDED RELEASE ORAL at 22:59

## 2018-04-11 RX ADMIN — ONDANSETRON HYDROCHLORIDE 4 MG: 2 INJECTION INTRAMUSCULAR; INTRAVENOUS at 10:35

## 2018-04-11 RX ADMIN — MORPHINE SULFATE 5 MG: 4 INJECTION, SOLUTION INTRAMUSCULAR; INTRAVENOUS at 14:50

## 2018-04-11 RX ADMIN — Medication 10 ML: at 20:52

## 2018-04-11 RX ADMIN — MORPHINE SULFATE 45 MG: 30 TABLET ORAL at 17:49

## 2018-04-11 RX ADMIN — PIPERACILLIN AND TAZOBACTAM 3.38 G: 3; .375 INJECTION, POWDER, FOR SOLUTION INTRAVENOUS at 17:51

## 2018-04-11 RX ADMIN — Medication 10 ML: at 17:52

## 2018-04-11 RX ADMIN — MORPHINE SULFATE 45 MG: 30 TABLET ORAL at 12:21

## 2018-04-11 RX ADMIN — MORPHINE SULFATE 45 MG: 30 TABLET ORAL at 10:23

## 2018-04-11 RX ADMIN — DRONABINOL 2.5 MG: 2.5 CAPSULE ORAL at 17:49

## 2018-04-11 RX ADMIN — Medication 10 ML: at 10:37

## 2018-04-11 RX ADMIN — ASCORBIC ACID: 500 INJECTION, SOLUTION INTRAMUSCULAR; INTRAVENOUS; SUBCUTANEOUS at 19:22

## 2018-04-11 RX ADMIN — MORPHINE SULFATE 100 MG: 100 TABLET, FILM COATED, EXTENDED RELEASE ORAL at 10:25

## 2018-04-11 RX ADMIN — ENOXAPARIN SODIUM 40 MG: 100 INJECTION SUBCUTANEOUS at 10:26

## 2018-04-11 RX ADMIN — MORPHINE SULFATE 100 MG: 100 TABLET, FILM COATED, EXTENDED RELEASE ORAL at 18:09

## 2018-04-11 RX ADMIN — IOPAMIDOL 100 ML: 755 INJECTION, SOLUTION INTRAVENOUS at 14:11

## 2018-04-11 RX ADMIN — MORPHINE SULFATE 45 MG: 30 TABLET ORAL at 20:51

## 2018-04-11 RX ADMIN — INSULIN LISPRO 4 UNITS: 100 INJECTION, SOLUTION INTRAVENOUS; SUBCUTANEOUS at 06:38

## 2018-04-11 RX ADMIN — ASCORBIC ACID: 500 INJECTION, SOLUTION INTRAMUSCULAR; INTRAVENOUS; SUBCUTANEOUS at 20:55

## 2018-04-11 RX ADMIN — ASPIRIN 325 MG: 325 TABLET ORAL at 10:23

## 2018-04-11 RX ADMIN — ASCORBIC ACID: 500 INJECTION, SOLUTION INTRAMUSCULAR; INTRAVENOUS; SUBCUTANEOUS at 08:03

## 2018-04-11 RX ADMIN — I.V. FAT EMULSION 250 ML: 20 EMULSION INTRAVENOUS at 20:09

## 2018-04-11 RX ADMIN — DRONABINOL 2.5 MG: 2.5 CAPSULE ORAL at 10:24

## 2018-04-11 RX ADMIN — MORPHINE SULFATE 45 MG: 30 TABLET ORAL at 06:30

## 2018-04-11 RX ADMIN — SODIUM CHLORIDE 100 ML: 900 INJECTION, SOLUTION INTRAVENOUS at 14:11

## 2018-04-11 RX ADMIN — Medication 10 ML: at 10:35

## 2018-04-11 RX ADMIN — Medication 10 ML: at 17:51

## 2018-04-11 RX ADMIN — ONDANSETRON HYDROCHLORIDE 4 MG: 2 INJECTION INTRAMUSCULAR; INTRAVENOUS at 06:30

## 2018-04-11 RX ADMIN — MORPHINE SULFATE 5 MG: 4 INJECTION, SOLUTION INTRAMUSCULAR; INTRAVENOUS at 10:17

## 2018-04-11 RX ADMIN — IOHEXOL 50 ML: 240 INJECTION, SOLUTION INTRATHECAL; INTRAVASCULAR; INTRAVENOUS; ORAL at 08:00

## 2018-04-11 NOTE — PROGRESS NOTES
Problem: Mobility Impaired (Adult and Pediatric)  Goal: *Acute Goals and Plan of Care (Insert Text)  Physical Therapy Goals  Update 4/6/2018  1. Patient will move from supine to sit and sit to supine  in bed with moderate assistance  within 7 day(s). 2.  Patient will transfer from bed to chair and chair to bed with moderate assistance  using the least restrictive device within 7 day(s). 3.  Patient will perform sit to stand with CGA within 7 days  4. Patient will ambulate 22' with moderate assist x 1 using LRAD within 7 days. Revised 3/27/2018  1. Patient will move from supine to sit and sit to supine  in bed with moderate assistance  within 7 day(s). 2.  Patient will transfer from bed to chair and chair to bed with moderate assistance  using the least restrictive device within 7 day(s). 3.  Patient will perform sit to stand with moderate assistance  within 7 day(s). 4.  Patient will ambulate 100' with moderate assist x1 using the least restrictive device within 7 day(s). Revisited 3/19/2018, goals remain appropriate, carry over    Physical Therapy Goals  Initiated 3/8/2018  1. Patient will move from supine to sit and sit to supine  in bed with moderate assistance  within 7 day(s). 2.  Patient will transfer from bed to chair and chair to bed with moderate assistance  using the least restrictive device within 7 day(s). 3.  Patient will perform sit to stand with moderate assistance  within 7 day(s). 4.  PT will assess gait with the least restrictive device within 7 day(s). physical Therapy TREATMENT  Patient:  Anil Mckeon (38 y.o. female)  Date: 4/11/2018  Diagnosis: FISTULA  Small bowel fistula  INTEROCUTANOUS FISTULA <principal problem not specified>  Procedure(s) (LRB):  EXPLORATORY LAPAROTOMY, LYSIS OF ADHESION X 5HOURS, SMALL BOWEL FISTULA TAKE DOWN X 2 AND WOUND VAC PLACEMENT (N/A) 35 Days Post-Op  Precautions: Fall, Skin  Chart, physical therapy assessment, plan of care and goals were reviewed. ASSESSMENT:  Pt was able to increase gait tolerance. Utilized bed in chair position to assist with standing. Pt was able to stand and ambulate with Min A to CGA. Pt is scheduled for surgery tomorrow. Pt may benefit from inpt rehab at discharge   Progression toward goals:  [x]    Improving appropriately and progressing toward goals  []    Improving slowly and progressing toward goals  []    Not making progress toward goals and plan of care will be adjusted     PLAN:  Patient continues to benefit from skilled intervention to address the above impairments. Continue treatment per established plan of care. Discharge Recommendations:  Inpatient Rehab  Further Equipment Recommendations for Discharge:  TBD     SUBJECTIVE:   Patient stated I stood up yesterday.     OBJECTIVE DATA SUMMARY:   Critical Behavior:  Neurologic State: Alert, Appropriate for age  Orientation Level: Oriented X4  Cognition: Appropriate decision making, Appropriate for age attention/concentration, Appropriate safety awareness, Follows commands  Safety/Judgement: Good awareness of safety precautions  Functional Mobility Training:  Bed Mobility:                    Transfers:  Sit to Stand: Minimum assistance  Stand to Sit: Contact guard assistance                             Balance:  Sitting: Intact; With support  Standing: Impaired  Standing - Static: Good  Standing - Dynamic : Fair  Ambulation/Gait Training:  Distance (ft): 25 Feet (ft)  Assistive Device: Walker, rolling  Ambulation - Level of Assistance: Contact guard assistance        Gait Abnormalities: Decreased step clearance        Base of Support: Widened     Speed/Liz: Slow  Step Length: Left shortened;Right shortened                    Stairs:              Neuro Re-Education:    Therapeutic Exercises:     Pain:  Pain Scale 1: Numeric (0 - 10)  Pain Intensity 1: 8  Pain Location 1: Abdomen  Pain Orientation 1: Mid  Pain Description 1: Throbbing  Pain Intervention(s) 1: Medication (see MAR)  Activity Tolerance:   Limited   Please refer to the flowsheet for vital signs taken during this treatment.   After treatment:   [x]    Patient left in no apparent distress sitting up in chair  []    Patient left in no apparent distress in bed  [x]    Call bell left within reach  [x]    Nursing notified  []    Caregiver present  []    Bed alarm activated    COMMUNICATION/COLLABORATION:   The patients plan of care was discussed with: Registered Nurse    Randell Resendez PTA   Time Calculation: 21 mins

## 2018-04-11 NOTE — PROGRESS NOTES
Pt sat in chair position and then was able to stand up with walker and  room while RN changed patients linens. Pt tolerated standing and walking well and was able to do mostly by herself.

## 2018-04-11 NOTE — PROGRESS NOTES
Physical Therapy    Reviewed chart and discussed with nurse and pt. Pt is scheduled for CT scan this AM and is in the process of drinking contrast. Pt is reporting feeling \"sloshy. \" Deferred treatment at this time. Pt is able to stand with rolling walker and bed placed in full chair position.

## 2018-04-11 NOTE — CDMP QUERY
There is noted documentation of Acute Anemia on this record. Could this be further clarified as    =>Acute Blood Loss Anemia secondary to bleeding from wound bed requiring transfusions. =>Other Explanation of clinical findings  =>Unable to Determine (no explanation of clinical findings)    The medical record reflects the following clinical findings, treatment, and risk factors:    Risk Factors: post op, Plavix, Lovenox    Clinical Indicators:   3/15 Hbg 5.4; 4/7 6.7  \"Acute anemia: This was secondary to bleeding from wound bed from wound vac therapy and patient being on Plavix and Lovenox. \" - MD PN    Treatment: Plavix was held and wound vac was removed. Patient has been transfused 3 units PRBC      Please clarify and document your clinical opinion in the progress notes and discharge summary including the definitive and/or presumptive diagnosis, (suspected or probable), related to the above clinical findings.  Please include clinical findings supporting your diagnosis      Thank you,  Stone Nicolas, RN  [phone]

## 2018-04-11 NOTE — PROGRESS NOTES
General Surgery Daily Progress Note    Admit Date: 3/7/2018  Post-Operative Day: 35 Days Post-Op from Procedure(s):  EXPLORATORY LAPAROTOMY, LYSIS OF ADHESION X 5HOURS, SMALL BOWEL FISTULA TAKE DOWN X 2 AND WOUND VAC PLACEMENT     Subjective:     Last 24 hrs: Pt is in good spirits. OOB in chair today and has walked to her door. Turns easily in bed. WBC and BS elevated. She admits to drinking sugary drinks. Objective:     Blood pressure 101/51, pulse (!) 116, temperature 99 °F (37.2 °C), resp. rate 18, height 5' 4\" (1.626 m), weight (!) 355 lb 2.6 oz (161.1 kg), SpO2 99 %. Temp (24hrs), Av.6 °F (37 °C), Min:98.3 °F (36.8 °C), Max:99 °F (37.2 °C)      _____________________  Physical Exam:     Alert and Oriented, x3, in no acute distress. 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         ______________________  Medications:    Current Facility-Administered Medications   Medication Dose Route Frequency    TPN ADULT - CENTRAL   IntraVENous TITRATE    0.9% sodium chloride infusion 250 mL  250 mL IntraVENous PRN    morphine injection 5 mg  5 mg IntraVENous DAILY    insulin lispro (HUMALOG) injection   SubCUTAneous BID    morphine IR (MS IR) tablet 45 mg 45 mg Oral Q3H    morphine injection 5 mg  5 mg IntraVENous Q2H PRN    morphine CR (MS CONTIN) tablet 115 mg  115 mg Oral QHS    morphine CR (MS CONTIN) tablet 100 mg  100 mg Oral Q24H    morphine CR (MS CONTIN) tablet 100 mg  100 mg Oral Q24H    aspirin (ASPIRIN) tablet 325 mg  325 mg Oral DAILY    0.9% sodium chloride infusion 250 mL  250 mL IntraVENous PRN    acetaminophen (TYLENOL) tablet 650 mg  650 mg Oral Q6H PRN    diphenhydrAMINE (BENADRYL) capsule 25 mg  25 mg Oral Q6H PRN    enoxaparin (LOVENOX) injection 40 mg  40 mg SubCUTAneous Q24H    fat emulsion 20% (LIPOSYN, INTRAlipid) infusion 250 mL  250 mL IntraVENous Q MON, WED & FRI    nystatin (MYCOSTATIN) 100,000 unit/gram cream   Topical BID    prochlorperazine (COMPAZINE) with saline injection 5 mg  5 mg IntraVENous Q6H PRN    naloxone (NARCAN) injection 0.4 mg  0.4 mg IntraVENous EVERY 2 MINUTES AS NEEDED    glucose chewable tablet 16 g  4 Tab Oral PRN    dextrose (D50W) injection syrg 12.5-25 g  12.5-25 g IntraVENous PRN    glucagon (GLUCAGEN) injection 1 mg  1 mg IntraMUSCular PRN    scopolamine (TRANSDERM-SCOP) 1 mg over 3 days 1 Patch  1 Patch TransDERmal Q72H    sodium chloride (NS) flush 10 mL  10 mL InterCATHeter Q24H    sodium chloride (NS) flush 10 mL  10 mL InterCATHeter Q8H    alteplase (CATHFLO) 1 mg in sterile water (preservative free) 1 mL injection  1 mg InterCATHeter PRN    bacitracin 500 unit/gram packet 1 Packet  1 Packet Topical PRN    sodium chloride (NS) flush 20 mL  20 mL InterCATHeter PRN    sodium chloride (NS) flush 10 mL  10 mL InterCATHeter Q24H    sodium chloride (NS) flush 10 mL  10 mL InterCATHeter PRN    sodium chloride (NS) flush 10 mL  10 mL InterCATHeter Q8H    dronabinol (MARINOL) capsule 2.5 mg  2.5 mg Oral BID    phenol throat spray (CHLORASEPTIC) 1 Spray  1 Spray Oral PRN    ondansetron (ZOFRAN) injection 4 mg  4 mg IntraVENous Q4H PRN    promethazine (PHENERGAN) 12.5 mg in 0.9% sodium chloride 50 mL IVPB  12.5 mg IntraVENous Q6H PRN    LORazepam (ATIVAN) injection 1 mg  1 mg IntraVENous Q6H PRN    albuterol (PROVENTIL VENTOLIN) nebulizer solution 2.5 mg  2.5 mg Nebulization Q4H PRN       Josef Stafford NP  4/11/2018      ADDENDUM:  Henna Santamaria MD  Pt seen and examined. No acute surgical issues. Pt tolerating diet. CT scan showed fistula but not helpful in understanding anatomy. Will plan OR tomorrow for feeding tube placement.

## 2018-04-11 NOTE — WOUND CARE
WOCN Note:     Follow-up visit for [facility] dressing change and fistula management. Chart shows:  Admitted for fistula takdown 3/7/18 by Dr. Brent Serrano with [facility] placement in [address] [place] [address]; history of multiple abdominal surgeries and Crohns disease. Admitted from home. Mother able to provide assistance in pouching prior to admission and became very competent in doing so.       Assessment:   Patient is A&O x 4, continent and mobile - moves around room with assistance.     Bed: total care bariatric  Pre-medicated by RN.       1. Midline abdominal surgical wound = 16 x 12 x 3.3 cm  95% bright red & granular wound base and 5% stomatized mucus membrane centrally with peristalsis. No catheter remains in stoma. 1 piece of black sponge with central hole to allow for pouching of the fistulas; 2 Hawk's rings placed around hole top and bottom to achieve seal, paste added.  After seal achieved, a convex pouch was placed to collect fistula output. 50 mmHg continuous suction.       Goal is to find a system for use at home until wound is small enough to pouch (we are getting close to a pouchable size)      Colostomy: stoma is red & moist; small amount of mucoid tan output; peristomal skin intact with mucosal transplantation noted; 2-piece flat pouch in use.     Abdominal folds MASD resolving nicely with stoma powder used as needed.      Wound Recommendations:    Continue NPWT as ordered @ 50 mmHg using black sponge and change Monday, Wednesday, & Friday. Skin Care & Pressure Relief Recommendations:  Minimize layers of linen/pads under patient to optimize support surface. Turn/reposition approximately every 2 hours and offload heels. Promote continence    Discussed above plan with patient & RN. Transition of Care: Plan to follow as needed while admitted to hospital with [facility] dressing changes on Mondays, Wednesdays, & Fridays. Unsure if she will require NPWT after discharge. YOLANDA LemonsN, RN, [facility]  Certified Wound, Ostomy, Continence Nurse  office [phone]  pager [phone] or call  to page

## 2018-04-11 NOTE — PROGRESS NOTES
NUTRITION COMPLETE ASSESSMENT    RECOMMENDATIONS:   1. Continue TPN at current goal  -- May need to increase insulin in TPN to keep BG below 200's and optimize healing    2. If/when J-tube placed and ready to use, would resume Peptamen 1.5 @ 20 mL/hr and increase 10 mL/hr q12 hours to goal of 60 mL/hr + 1 pkt liquid Prosource BID + 100 mL flush q 3 hours  -- Will need to monitor ileostomy output closely as absorption is compromised and it is not guaranteed that she will tolerate tube feedings with remaining bowel     3. Would not wean TPN until able to reach tube feeding rate of 40 mL and absorption and tolerance are established    4. STANDING SCALE WEIGHT (last standing weight is from 3/30)     Interventions/Plan:   Food/Nutrient Delivery:  Initiate parenteral nutrition    Assessment:   Reason for Assessment:   [x]Reassessment     Diet:  Mech Soft  Nutritionally Significant Medications: [x] Reviewed & Includes: marinol twice/day; humalog correction scale (insulin resistant); zofran q 4 hours prn; compazine q 6 hours prn; scopolamine patch q72 hours; phenergen q6 hours prn    TPN: 6%AA D20 @ 90 mL/hr x 1 hour; 155 mL/hr x 12 hours; 90 mL/hr x 1 hour + MVI, thiamine (100 mg), famotidine (40 mg), ascorbic acid 500 mg, 32 units insulin/L + 20% lipids, 250 mL 3x/week  Meal Intake: Patient Vitals for the past 100 hrs:   % Diet Eaten   04/10/18 1557 75 %   04/10/18 1420 50 %   04/10/18 1002 15 %   04/07/18 1915 30 %     Subjective:  Pt in good spirits. PCT's and nursing assisting with bath (pt welcomed RD to come in despite being bathed). Pt aware that she will go to the OR tomorrow for attempt at J-tube placement. She confirms that previous feeding tube came out and no tube feedings have been given since 4/7. Objective:  Chart reviewed, discussed with RN. Pt remains on TPN as primary source of nutrition. Enteral feedings started, but discontinued after access was lost on 4/7. Rate has been increased to 20 mL/hr. Intake of [facility] Soft meals has been 15-75% of meals. TPN continues to provide a daily average of 2422 kcal, 144 g protein and 2400 mL (2650 mL on lipid days)-- meeting 100% of estimated needs. CT scan today- results pending. Fistula Output:  550 mL so far today  3500 mL (4/10)  1895 mL (4/9)  2225 mL (4/8)    If/when J-tube placed and ready to use, would resume Peptamen 1.5 @ 20 mL/hr and increase 10 mL/hr q12 hours to goal of 60 mL/hr + 1 pkt liquid Prosource BID + 100 mL flush q 3 hours. If tolerated, could work towards cyclic regimen. Goal will provide 2280 kcal, 128 g protein and 1970 mL total free water (tf + flush)-- meeting 100% of estimated needs. **It's unclear if pt will be able to absorb tube feeding with remaining bowel, so will have to monitor ileostomy output closely. Would not wean TPN until she is at least at 40 mL/hr. Last standing weight from 3/30-- pt aware she needs a standing weight, but states that \"no one brings the scale in\". Pt states bed has been rezeroed; however, current bed weight remains 11.2 kg up from standing weight on 3/30. RN aware an updated weight is needed and will pass in report for the next person to get pt out of bed and standing. Estimated Nutrition Needs:   Kcals/day: 1649 Kcals/day (7455-6736 kcal/day (11-15 kcal/kg))  Protein: 120 g (120-150 kcal/day (0.8-1 g/kg))  Fluid: 2250 ml (or 1 mL/kcal or per output)     Based On: Kcal/kg - specify (Comment)  Weight Used: Actual wt (149.9 kg)    Pt expected to meet estimated nutrient needs:  [x]   Yes  (via TPN)    Nutrition Diagnosis:   1. 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 Less than optimal parenteral nutrition related to low volume/concentration TPN as evidenced by resolved.     Goals:     Pt to meet at least 90% of estimated needs via TPN over the next 5-7 days     Monitoring & Evaluation:    - Enteral/parenteral nutrition intake   - Weight/weight change     Previous Nutrition Goals Met:  Yes  Previous Recommendations:      Yes    Education & Discharge Needs:   [] None Identified   [x] Identified and addressed    [x] Participated in care plan, discharge planning, and/or interdisciplinary rounds        Cultural, Sabianism and ethnic food preferences identified:  NONE      Skin Integrity: []Intact  [x]Other: see WOCN documentation  Edema: []None [x]Other: 1-3+  Last BM: 4/10/18  Food Allergies: [x]None []Other    Anthropometrics:    Weight Loss Metrics 4/11/2018 3/7/2018 3/5/2018 2/28/2018 2/27/2018 2/27/2018 2/26/2018   Today's Wt 355 lb 2.6 oz - 304 lb 3.2 oz 306 lb 306 lb 8 oz - 307 lb   BMI - 60.96 kg/m2 52.22 kg/m2 52.52 kg/m2 52.61 kg/m2 - 52.7 kg/m2      Last 3 Recorded Weights in this Encounter    04/09/18 0640 04/10/18 0713 04/11/18 0704   Weight: (!) 161.1 kg (355 lb 2.6 oz) 158.3 kg (349 lb 0.5 oz) (!) 161.1 kg (355 lb 2.6 oz)      Weight Source: Bed  Height: 5' 4\" (162.6 cm),    Body mass index is 60.96 kg/(m^2). IBW : 54.4 kg (120 lb),    Usual Body Weight: 135.2 kg (298 lb) (1/2018),      Labs:  Lab Results   Component Value Date/Time    Sodium 133 (L) 04/11/2018 03:40 AM    Potassium 4.8 04/11/2018 03:40 AM    Chloride 102 04/11/2018 03:40 AM    CO2 23 04/11/2018 03:40 AM    Glucose 201 (H) 04/11/2018 03:40 AM    BUN 26 (H) 04/11/2018 03:40 AM    Creatinine 0.69 04/11/2018 03:40 AM    Calcium 9.2 04/11/2018 03:40 AM    Magnesium 1.5 (L) 04/05/2018 02:50 AM    Phosphorus 2.7 04/05/2018 02:50 AM    Albumin 1.2 (L) 03/29/2018 04:29 AM     No results found for: HBA1C, HGBE8, GEO6DVNK, RXI6CHOX  Lab Results   Component Value Date/Time    Glucose 201 (H) 04/11/2018 03:40 AM    Glucose (POC) 206 (H) 04/11/2018 06:33 AM      Lab Results   Component Value Date/Time    ALT (SGPT) 8 (L) 03/29/2018 04:29 AM    AST (SGOT) 10 (L) 03/29/2018 04:29 AM    Alk.  phosphatase 95 03/29/2018 04:29 AM    Bilirubin, direct 0.1 07/07/2009 06:38 PM    Bilirubin, total 0.3 03/29/2018 04:29 AM      [address]

## 2018-04-12 ENCOUNTER — APPOINTMENT (OUTPATIENT)
Dept: GENERAL RADIOLOGY | Age: 43
DRG: 330 | End: 2018-04-12
Attending: SURGERY
Payer: MEDICARE

## 2018-04-12 ENCOUNTER — ANESTHESIA (OUTPATIENT)
Dept: SURGERY | Age: 43
DRG: 330 | End: 2018-04-12
Payer: MEDICARE

## 2018-04-12 LAB
GLUCOSE BLD STRIP.AUTO-MCNC: 128 MG/DL (ref 65–100)
GLUCOSE BLD STRIP.AUTO-MCNC: 170 MG/DL (ref 65–100)
GLUCOSE BLD STRIP.AUTO-MCNC: 203 MG/DL (ref 65–100)
GLUCOSE BLD STRIP.AUTO-MCNC: 314 MG/DL (ref 65–100)
GLUCOSE BLD STRIP.AUTO-MCNC: 350 MG/DL (ref 65–100)
SERVICE CMNT-IMP: ABNORMAL

## 2018-04-12 PROCEDURE — 74011250637 HC RX REV CODE- 250/637: Performed by: INTERNAL MEDICINE

## 2018-04-12 PROCEDURE — 82962 GLUCOSE BLOOD TEST: CPT

## 2018-04-12 PROCEDURE — 74011250636 HC RX REV CODE- 250/636

## 2018-04-12 PROCEDURE — 3331090002 HH PPS REVENUE DEBIT

## 2018-04-12 PROCEDURE — 76210000006 HC OR PH I REC 0.5 TO 1 HR: Performed by: SURGERY

## 2018-04-12 PROCEDURE — 74011000258 HC RX REV CODE- 258: Performed by: SURGERY

## 2018-04-12 PROCEDURE — 74011250637 HC RX REV CODE- 250/637: Performed by: PHYSICIAN ASSISTANT

## 2018-04-12 PROCEDURE — 77030019903 HC CATH URET INT BARD -A: Performed by: SURGERY

## 2018-04-12 PROCEDURE — 74011000258 HC RX REV CODE- 258: Performed by: NURSE PRACTITIONER

## 2018-04-12 PROCEDURE — 74018 RADEX ABDOMEN 1 VIEW: CPT

## 2018-04-12 PROCEDURE — 74011250636 HC RX REV CODE- 250/636: Performed by: NURSE PRACTITIONER

## 2018-04-12 PROCEDURE — 76010000135 HC OR TIME 4 TO 4.5 HR: Performed by: SURGERY

## 2018-04-12 PROCEDURE — 74011250636 HC RX REV CODE- 250/636: Performed by: SURGERY

## 2018-04-12 PROCEDURE — 74011250637 HC RX REV CODE- 250/637: Performed by: NURSE PRACTITIONER

## 2018-04-12 PROCEDURE — 77030010545: Performed by: SURGERY

## 2018-04-12 PROCEDURE — 65270000029 HC RM PRIVATE

## 2018-04-12 PROCEDURE — 74011636637 HC RX REV CODE- 636/637: Performed by: NURSE PRACTITIONER

## 2018-04-12 PROCEDURE — 74011000250 HC RX REV CODE- 250: Performed by: NURSE PRACTITIONER

## 2018-04-12 PROCEDURE — 77030018836 HC SOL IRR NACL ICUM -A: Performed by: SURGERY

## 2018-04-12 PROCEDURE — 77030011640 HC PAD GRND REM COVD -A: Performed by: SURGERY

## 2018-04-12 PROCEDURE — 77030031415 HC TU FEED NG DOBHF COVD -A: Performed by: SURGERY

## 2018-04-12 PROCEDURE — 76000 FLUOROSCOPY <1 HR PHYS/QHP: CPT

## 2018-04-12 PROCEDURE — 74011250636 HC RX REV CODE- 250/636: Performed by: PHYSICAL MEDICINE & REHABILITATION

## 2018-04-12 PROCEDURE — 3331090001 HH PPS REVENUE CREDIT

## 2018-04-12 PROCEDURE — 2W03X6Z CHANGE PRESSURE DRESSING ON ABDOMINAL WALL: ICD-10-PCS | Performed by: SURGERY

## 2018-04-12 PROCEDURE — 77030031139 HC SUT VCRL2 J&J -A: Performed by: SURGERY

## 2018-04-12 PROCEDURE — 74011250637 HC RX REV CODE- 250/637: Performed by: PHYSICAL MEDICINE & REHABILITATION

## 2018-04-12 PROCEDURE — 74011636637 HC RX REV CODE- 636/637: Performed by: SURGERY

## 2018-04-12 PROCEDURE — 77030002916 HC SUT ETHLN J&J -A: Performed by: SURGERY

## 2018-04-12 PROCEDURE — 74011000250 HC RX REV CODE- 250

## 2018-04-12 PROCEDURE — 74011250636 HC RX REV CODE- 250/636: Performed by: ANESTHESIOLOGY

## 2018-04-12 PROCEDURE — 74011000250 HC RX REV CODE- 250: Performed by: SURGERY

## 2018-04-12 RX ORDER — FENTANYL CITRATE 50 UG/ML
25 INJECTION, SOLUTION INTRAMUSCULAR; INTRAVENOUS
Status: DISCONTINUED | OUTPATIENT
Start: 2018-04-12 | End: 2018-04-12 | Stop reason: HOSPADM

## 2018-04-12 RX ORDER — ONDANSETRON 2 MG/ML
INJECTION INTRAMUSCULAR; INTRAVENOUS AS NEEDED
Status: DISCONTINUED | OUTPATIENT
Start: 2018-04-12 | End: 2018-04-12 | Stop reason: HOSPADM

## 2018-04-12 RX ORDER — FENTANYL CITRATE 50 UG/ML
INJECTION, SOLUTION INTRAMUSCULAR; INTRAVENOUS AS NEEDED
Status: DISCONTINUED | OUTPATIENT
Start: 2018-04-12 | End: 2018-04-12 | Stop reason: HOSPADM

## 2018-04-12 RX ORDER — PROPOFOL 10 MG/ML
INJECTION, EMULSION INTRAVENOUS
Status: DISCONTINUED | OUTPATIENT
Start: 2018-04-12 | End: 2018-04-12 | Stop reason: HOSPADM

## 2018-04-12 RX ORDER — SODIUM CHLORIDE, SODIUM LACTATE, POTASSIUM CHLORIDE, CALCIUM CHLORIDE 600; 310; 30; 20 MG/100ML; MG/100ML; MG/100ML; MG/100ML
INJECTION, SOLUTION INTRAVENOUS
Status: DISCONTINUED | OUTPATIENT
Start: 2018-04-12 | End: 2018-04-12 | Stop reason: HOSPADM

## 2018-04-12 RX ORDER — DEXAMETHASONE SODIUM PHOSPHATE 4 MG/ML
INJECTION, SOLUTION INTRA-ARTICULAR; INTRALESIONAL; INTRAMUSCULAR; INTRAVENOUS; SOFT TISSUE AS NEEDED
Status: DISCONTINUED | OUTPATIENT
Start: 2018-04-12 | End: 2018-04-12 | Stop reason: HOSPADM

## 2018-04-12 RX ORDER — DEXMEDETOMIDINE HYDROCHLORIDE 100 UG/ML
INJECTION, SOLUTION INTRAVENOUS AS NEEDED
Status: DISCONTINUED | OUTPATIENT
Start: 2018-04-12 | End: 2018-04-12 | Stop reason: HOSPADM

## 2018-04-12 RX ORDER — MIDAZOLAM HYDROCHLORIDE 1 MG/ML
INJECTION, SOLUTION INTRAMUSCULAR; INTRAVENOUS AS NEEDED
Status: DISCONTINUED | OUTPATIENT
Start: 2018-04-12 | End: 2018-04-12 | Stop reason: HOSPADM

## 2018-04-12 RX ORDER — FENTANYL CITRATE 50 UG/ML
INJECTION, SOLUTION INTRAMUSCULAR; INTRAVENOUS
Status: COMPLETED
Start: 2018-04-12 | End: 2018-04-12

## 2018-04-12 RX ADMIN — ENOXAPARIN SODIUM 40 MG: 100 INJECTION SUBCUTANEOUS at 09:16

## 2018-04-12 RX ADMIN — MORPHINE SULFATE 115 MG: 15 TABLET, EXTENDED RELEASE ORAL at 22:41

## 2018-04-12 RX ADMIN — MORPHINE SULFATE 5 MG: 4 INJECTION, SOLUTION INTRAMUSCULAR; INTRAVENOUS at 09:16

## 2018-04-12 RX ADMIN — DEXAMETHASONE SODIUM PHOSPHATE 4 MG: 4 INJECTION, SOLUTION INTRA-ARTICULAR; INTRALESIONAL; INTRAMUSCULAR; INTRAVENOUS; SOFT TISSUE at 12:45

## 2018-04-12 RX ADMIN — INSULIN LISPRO 4 UNITS: 100 INJECTION, SOLUTION INTRAVENOUS; SUBCUTANEOUS at 06:22

## 2018-04-12 RX ADMIN — MORPHINE SULFATE 5 MG: 4 INJECTION, SOLUTION INTRAMUSCULAR; INTRAVENOUS at 19:26

## 2018-04-12 RX ADMIN — MORPHINE SULFATE 45 MG: 30 TABLET ORAL at 16:01

## 2018-04-12 RX ADMIN — FENTANYL CITRATE 50 MCG: 50 INJECTION, SOLUTION INTRAMUSCULAR; INTRAVENOUS at 14:41

## 2018-04-12 RX ADMIN — PIPERACILLIN AND TAZOBACTAM 3.38 G: 3; .375 INJECTION, POWDER, FOR SOLUTION INTRAVENOUS at 09:07

## 2018-04-12 RX ADMIN — MORPHINE SULFATE 45 MG: 30 TABLET ORAL at 09:14

## 2018-04-12 RX ADMIN — ASCORBIC ACID: 500 INJECTION, SOLUTION INTRAMUSCULAR; INTRAVENOUS; SUBCUTANEOUS at 20:51

## 2018-04-12 RX ADMIN — FENTANYL CITRATE 50 MCG: 50 INJECTION, SOLUTION INTRAMUSCULAR; INTRAVENOUS at 14:42

## 2018-04-12 RX ADMIN — PROPOFOL 100 MCG/KG/MIN: 10 INJECTION, EMULSION INTRAVENOUS at 12:37

## 2018-04-12 RX ADMIN — PIPERACILLIN AND TAZOBACTAM 3.38 G: 3; .375 INJECTION, POWDER, FOR SOLUTION INTRAVENOUS at 17:38

## 2018-04-12 RX ADMIN — DRONABINOL 2.5 MG: 2.5 CAPSULE ORAL at 09:15

## 2018-04-12 RX ADMIN — INSULIN LISPRO 3 UNITS: 100 INJECTION, SOLUTION INTRAVENOUS; SUBCUTANEOUS at 19:26

## 2018-04-12 RX ADMIN — PIPERACILLIN AND TAZOBACTAM 3.38 G: 3; .375 INJECTION, POWDER, FOR SOLUTION INTRAVENOUS at 00:54

## 2018-04-12 RX ADMIN — Medication 10 ML: at 04:05

## 2018-04-12 RX ADMIN — ASCORBIC ACID: 500 INJECTION, SOLUTION INTRAMUSCULAR; INTRAVENOUS; SUBCUTANEOUS at 19:27

## 2018-04-12 RX ADMIN — MORPHINE SULFATE 45 MG: 30 TABLET ORAL at 20:52

## 2018-04-12 RX ADMIN — ONDANSETRON 4 MG: 2 INJECTION INTRAMUSCULAR; INTRAVENOUS at 12:45

## 2018-04-12 RX ADMIN — MORPHINE SULFATE 5 MG: 4 INJECTION, SOLUTION INTRAMUSCULAR; INTRAVENOUS at 05:55

## 2018-04-12 RX ADMIN — ASPIRIN 325 MG: 325 TABLET ORAL at 09:14

## 2018-04-12 RX ADMIN — DEXMEDETOMIDINE HYDROCHLORIDE 6 MCG: 100 INJECTION, SOLUTION INTRAVENOUS at 12:39

## 2018-04-12 RX ADMIN — MORPHINE SULFATE 100 MG: 100 TABLET, FILM COATED, EXTENDED RELEASE ORAL at 09:15

## 2018-04-12 RX ADMIN — Medication 10 ML: at 05:55

## 2018-04-12 RX ADMIN — MORPHINE SULFATE 100 MG: 100 TABLET, FILM COATED, EXTENDED RELEASE ORAL at 16:02

## 2018-04-12 RX ADMIN — ONDANSETRON HYDROCHLORIDE 4 MG: 2 INJECTION INTRAMUSCULAR; INTRAVENOUS at 05:56

## 2018-04-12 RX ADMIN — Medication 10 ML: at 22:40

## 2018-04-12 RX ADMIN — ONDANSETRON HYDROCHLORIDE 4 MG: 2 INJECTION INTRAMUSCULAR; INTRAVENOUS at 22:40

## 2018-04-12 RX ADMIN — MORPHINE SULFATE 5 MG: 4 INJECTION, SOLUTION INTRAMUSCULAR; INTRAVENOUS at 02:51

## 2018-04-12 RX ADMIN — MORPHINE SULFATE 45 MG: 30 TABLET ORAL at 00:54

## 2018-04-12 RX ADMIN — SODIUM CHLORIDE, SODIUM LACTATE, POTASSIUM CHLORIDE, CALCIUM CHLORIDE: 600; 310; 30; 20 INJECTION, SOLUTION INTRAVENOUS at 12:29

## 2018-04-12 RX ADMIN — DEXMEDETOMIDINE HYDROCHLORIDE 6 MCG: 100 INJECTION, SOLUTION INTRAVENOUS at 14:36

## 2018-04-12 RX ADMIN — ASCORBIC ACID: 500 INJECTION, SOLUTION INTRAMUSCULAR; INTRAVENOUS; SUBCUTANEOUS at 07:32

## 2018-04-12 RX ADMIN — MORPHINE SULFATE 45 MG: 30 TABLET ORAL at 17:38

## 2018-04-12 RX ADMIN — Medication 10 ML: at 02:52

## 2018-04-12 RX ADMIN — DRONABINOL 2.5 MG: 2.5 CAPSULE ORAL at 17:39

## 2018-04-12 RX ADMIN — MIDAZOLAM HYDROCHLORIDE 1 MG: 1 INJECTION, SOLUTION INTRAMUSCULAR; INTRAVENOUS at 12:31

## 2018-04-12 RX ADMIN — FENTANYL CITRATE 25 MCG: 50 INJECTION, SOLUTION INTRAMUSCULAR; INTRAVENOUS at 15:15

## 2018-04-12 NOTE — PROGRESS NOTES
TRANSFER - IN REPORT:    Verbal report received from Ailyn(name) on Zita  being received from PACU(unit) for routine post - op      Report consisted of patients Situation, Background, Assessment and   Recommendations(SBAR). Information from the following report(s) SBAR, OR Summary and MAR was reviewed with the receiving nurse. Opportunity for questions and clarification was provided. Assessment completed upon patients arrival to unit and care assumed.

## 2018-04-12 NOTE — ROUTINE PROCESS
Pt refusing standing scale weight and CHG bath at this time due to pain.  States she would like to try again later in the morning

## 2018-04-12 NOTE — ROUTINE PROCESS
Patient: Jayson Shook MRN: [id number]  SSN: [id number]   YOB: 1975  Age: 38 y.o. Sex: female     Patient is status post Procedure(s):  OPEN JEJUNOSTOMY TUBE PLACEMENT UNDER FLURO/ WOUND VAC CHANGE. Jennifer Rea) and Role:     * Mesha Brand MD - Primary    Local/Dose/Irrigation:gastrograrafin 37% organically bond iodine. 60 ml injected via catheter for feeding tube placement    Feeding Tube 04/12/18 (Active)          PICC Double Lumen 03/07/18 Right (Active)   Central Line Being Utilized Yes 4/12/2018  2:34 PM   Criteria for Appropriate Use Hemodynamically unstable, requiring monitoring lines, vasopressors, or volume resuscitation 4/12/2018  2:34 PM   Site Assessment Clean, dry, & intact 4/12/2018  2:34 PM   Phlebitis Assessment 0 4/12/2018  2:34 PM   Infiltration Assessment 0 4/12/2018  2:34 PM   Date of Last Dressing Change 04/09/18 4/11/2018  8:51 PM   Dressing Status Clean, dry, & intact 4/11/2018  8:51 PM   Action Taken Open ports on tubing capped 4/11/2018  4:30 PM   External Catheter Length (cm) 4 centimeters 4/9/2018  3:57 PM   Dressing Type Disk with Chlorhexadine gluconate (CHG); Transparent 4/11/2018  4:30 PM   Hub Color/Line Status Purple;Flushed; Infusing 4/11/2018  8:51 PM   Positive Blood Return (Site #1) Yes 4/11/2018  8:51 PM   Hub Color/Line Status White;Flushed; Infusing 4/11/2018  8:51 PM   Positive Blood Return (Site #2) Yes 4/11/2018  8:51 PM   Alcohol Cap Used Yes 4/11/2018  8:51 PM                Vacuum Assisted Closure Anterior Abdominal (Active)   Vac Status Suction, continuous 4/12/2018  2:34 PM   Suction (mmHg) 50 4/12/2018  2:34 PM   Site Assessment Clean, dry, & intact 4/12/2018 12:47 AM   Dressing Status Clean, dry, & intact 4/12/2018 12:47 AM   Drainage Description Serosanguinous 4/12/2018 12:47 AM   Drainage Chamber Level (ml) 0 ml 4/12/2018  2:34 PM   Cannister Changed Yes 4/11/2018  9:18 AM   Output (ml) 90 ml 4/12/2018  9:25 AM       Ileostomy 03/07/18 Lower Abdomen (Active)   Drainage Color Yellow 4/12/2018  2:34 PM   Site Assessment Clean, dry, intact 4/12/2018  2:34 PM   Treatment Site care 4/10/2018 10:02 AM   Output (ml) 50 mL 4/12/2018  9:19 AM       Drain midline abdominal wound 03/15/18 Abdomen (Active)   Site Assessment Clean, dry, & intact 4/12/2018 12:47 AM   Dressing Status Clean, dry, & intact 4/12/2018 12:47 AM   Status Patent;Draining 4/12/2018 12:47 AM   Drainage Description Claudia Isbell 4/11/2018  9:18 AM   Intake (ml) 350 ml 4/5/2018  2:00 PM   Output (ml) 250 ml 4/12/2018  9:19 AM                     Dressing/Packing:  Wound Abdomen-DRESSING TYPE: Vacuum dressing (04/12/18 1434)  Splint/Cast:  ]    Other:  Wound vac change.  Fistula pouch changed by Maik Hall RN, Wound Care

## 2018-04-12 NOTE — ROUTINE PROCESS
Bedside shift change report given to ST. ROLA MUNSON (oncoming nurse) by Keisha Rodarte (offgoing nurse). Report included the following information SBAR, Kardex, Intake/Output, MAR and Recent Results.

## 2018-04-12 NOTE — WOUND CARE
Continuing to follow - see previous note for full details. Assisted Dr. Xiao Johnson in OR with [facility] placement after feeding tube placement. One black sponge used to achieve 50 mmHg continuous suction with Hawk's ring top and bottom centrally to isolate fistula. Red rubber cath is in fistula and brought up thru a convex 2-piece pouch. The cath is then secured to RUST with cath secure/zip tie. Will continue to follow while admitted; next scheduled VAC dressing change is Monday 4/16.   AMPARO Stahl

## 2018-04-12 NOTE — DIABETES MGMT
DTC Progress Note    Recommendations/ Comments: Chart reviewed due to hyperglycemia. FBG > 200 mg/dl    If appropriate pleas consider:  Increase insulin in TPN to lower AM BG's     Current hospital DM medication:  Lispro resistant correction scale - she received 14 units yesterday  TPN with  - cyclic 7 PM to 7 AM. Contains 38 units/L, total 91 units    Nutrition consult note 4/11/2018 appreciated  Noted - surgery for J tube placement today. Will begin TF when ready                               DTC to follow. Chart reviewed on [address] . Patient is a 38 y.o. female with known diabetes on Humalog correction scale at home. A1c:   No results found for: HBA1C, HGBE8, ETD4DYXL, GIH4BGSE    Recent Glucose Results:   Lab Results   Component Value Date/Time    GLUCPOC 128 (H) 04/12/2018 11:34 AM    GLUCPOC 203 (H) 04/12/2018 06:04 AM    GLUCPOC 104 (H) 04/11/2018 07:16 PM        Lab Results   Component Value Date/Time    Creatinine 0.69 04/11/2018 03:40 AM     Estimated Creatinine Clearance: 157.9 mL/min (based on Cr of 0.69). Active Orders   Diet    DIET NPO With Meds        PO intake:   Patient Vitals for the past 72 hrs:   % Diet Eaten   04/10/18 1557 75 %   04/10/18 1420 50 %   04/10/18 1002 15 %       Will continue to follow as needed.     Thank you  Rogelio Hanley RN, CDE

## 2018-04-12 NOTE — PERIOP NOTES
TRANSFER - IN REPORT:    Verbal report received from Danisha Boston RN(name) on [facility]  being received from 5E(unit) for ordered procedure      Report consisted of patients Situation, Background, Assessment and   Recommendations(SBAR). Information from the following report(s) SBAR, Kardex, [facility] and Recent Results was reviewed with the receiving nurse. Opportunity for questions and clarification was provided. Assessment completed upon patients arrival to unit and care assumed.

## 2018-04-12 NOTE — PERIOP NOTES
TRANSFER - OUT REPORT:    Verbal report given to Stephanie Dennis RN on Laurie Bailey  being transferred to 56 for routine post - op       Report consisted of patients Situation, Background, Assessment and   Recommendations(SBAR). Time Pre op antibiotic given:on floor  Anesthesia Stop time: 6977  Reilly Present on Transfer to floor:no  Order for Relily on Chart:na  Discharge Prescriptions with Chart:no    Information from the following report(s) SBAR, OR Summary, Intake/Output and MAR was reviewed with the receiving nurse. Opportunity for questions and clarification was provided. Is the patient on 02? NO       L/Min 0       Other 0    Is the patient on a monitor? NO    Is the nurse transporting with the patient? NO    Surgical Waiting Area notified of patient's transfer from PACU? NO mom in room      The following personal items collected during your admission accompanied patient upon transfer:   Dental Appliance: Dental Appliances: None  Vision: Visual Aid: Glasses  Hearing Aid:    Jewelry: Jewelry: Body Piercing (given to transporter to take back to room)  Clothing: Clothing:  (2 plastic bags of clothing placed on lower frame of pt's bed)  Other Valuables:  Other Valuables: Eyeglasses  Valuables sent to safe:

## 2018-04-12 NOTE — PROGRESS NOTES
Bedside shift change report given to Corinne (oncoming nurse) by Candace Molina (offgoing nurse). Report included the following information SBAR and Intake/Output.

## 2018-04-12 NOTE — BRIEF OP NOTE
BRIEF OPERATIVE NOTE    Date of Procedure: 4/12/2018   Preoperative Diagnosis: ENTEROCUTANOUS FISTULA  Postoperative Diagnosis: ENTEROCUTANOUS FISTULA    Procedure(s):  OPEN JEJUNOSTOMY TUBE PLACEMENT UNDER FLURO/ WOUND VAC CHANGE. Surgeon(s) and Role:     * Rosalinda Haley MD - Primary         Surgical Assistant: None    Surgical Staff:  Circ-1: Michelle Virk RN  Circ-Relief: Ben Camacho RN  Scrub Tech-1: Jennifer Head  Surg Asst-1: Gil Miss  Event Time In   Incision Start 1303   Incision Close 1416     Anesthesia: Con-Sed   Estimated Blood Loss: Minimal  Specimens: * No specimens in log *   Findings: There was a proximal EC fistula on the left abdominal wound edge. A 20 [place]guillan Red Rubber feeding tube is placed in the distal end of the fistula on the right side. The inftra-operative fluoro showed gastrograffin in the small bowel.   Complications: None  Implants: * No implants in log *

## 2018-04-12 NOTE — ANESTHESIA PREPROCEDURE EVALUATION
Anesthetic History   No history of anesthetic complications            Review of Systems / Medical History  Patient summary reviewed, nursing notes reviewed and pertinent labs reviewed    Pulmonary  Within defined limits                 Neuro/Psych   Within defined limits           Cardiovascular  Within defined limits                     GI/Hepatic/Renal  Within defined limits              Endo/Other      Hypothyroidism: well controlled  Morbid obesity     Other Findings            Physical Exam    Airway  Mallampati: II  TM Distance: > 6 cm  Neck ROM: normal range of motion   Mouth opening: Normal     Cardiovascular  Regular rate and rhythm,  S1 and S2 normal,  no murmur, click, rub, or gallop             Dental  No notable dental hx       Pulmonary  Breath sounds clear to auscultation               Abdominal  GI exam deferred       Other Findings            Anesthetic Plan    ASA: 3  Anesthesia type: MAC          Induction: Intravenous  Anesthetic plan and risks discussed with: Patient

## 2018-04-12 NOTE — PROGRESS NOTES
Physical Therapy    Reviewed chart. Noted pt is in surgery. Will defer for the day and follow up tomorrow.

## 2018-04-13 LAB
ANION GAP SERPL CALC-SCNC: 8 MMOL/L (ref 5–15)
BUN SERPL-MCNC: 29 MG/DL (ref 6–20)
BUN/CREAT SERPL: 35 (ref 12–20)
CALCIUM SERPL-MCNC: 8.8 MG/DL (ref 8.5–10.1)
CHLORIDE SERPL-SCNC: 104 MMOL/L (ref 97–108)
CO2 SERPL-SCNC: 21 MMOL/L (ref 21–32)
CREAT SERPL-MCNC: 0.82 MG/DL (ref 0.55–1.02)
ERYTHROCYTE [DISTWIDTH] IN BLOOD BY AUTOMATED COUNT: 18.6 % (ref 11.5–14.5)
GLUCOSE BLD STRIP.AUTO-MCNC: 152 MG/DL (ref 65–100)
GLUCOSE BLD STRIP.AUTO-MCNC: 155 MG/DL (ref 65–100)
GLUCOSE BLD STRIP.AUTO-MCNC: 166 MG/DL (ref 65–100)
GLUCOSE BLD STRIP.AUTO-MCNC: 406 MG/DL (ref 65–100)
GLUCOSE SERPL-MCNC: 364 MG/DL (ref 65–100)
HCT VFR BLD AUTO: 25.2 % (ref 35–47)
HGB BLD-MCNC: 7.8 G/DL (ref 11.5–16)
MAGNESIUM SERPL-MCNC: 2.1 MG/DL (ref 1.6–2.4)
MCH RBC QN AUTO: 30.4 PG (ref 26–34)
MCHC RBC AUTO-ENTMCNC: 31 G/DL (ref 30–36.5)
MCV RBC AUTO: 98.1 FL (ref 80–99)
NRBC # BLD: 0 K/UL (ref 0–0.01)
NRBC BLD-RTO: 0 PER 100 WBC
PHOSPHATE SERPL-MCNC: 3.6 MG/DL (ref 2.6–4.7)
PLATELET # BLD AUTO: 378 K/UL (ref 150–400)
PMV BLD AUTO: 9.6 FL (ref 8.9–12.9)
POTASSIUM SERPL-SCNC: 4.8 MMOL/L (ref 3.5–5.1)
RBC # BLD AUTO: 2.57 M/UL (ref 3.8–5.2)
SERVICE CMNT-IMP: ABNORMAL
SODIUM SERPL-SCNC: 133 MMOL/L (ref 136–145)
WBC # BLD AUTO: 19.1 K/UL (ref 3.6–11)

## 2018-04-13 PROCEDURE — 65270000029 HC RM PRIVATE

## 2018-04-13 PROCEDURE — 74011250636 HC RX REV CODE- 250/636: Performed by: PHYSICAL MEDICINE & REHABILITATION

## 2018-04-13 PROCEDURE — 83735 ASSAY OF MAGNESIUM: CPT | Performed by: SURGERY

## 2018-04-13 PROCEDURE — 97116 GAIT TRAINING THERAPY: CPT

## 2018-04-13 PROCEDURE — 74011000258 HC RX REV CODE- 258: Performed by: SURGERY

## 2018-04-13 PROCEDURE — 74011250636 HC RX REV CODE- 250/636: Performed by: SURGERY

## 2018-04-13 PROCEDURE — 97535 SELF CARE MNGMENT TRAINING: CPT

## 2018-04-13 PROCEDURE — 82962 GLUCOSE BLOOD TEST: CPT

## 2018-04-13 PROCEDURE — 97110 THERAPEUTIC EXERCISES: CPT

## 2018-04-13 PROCEDURE — 80048 BASIC METABOLIC PNL TOTAL CA: CPT | Performed by: SURGERY

## 2018-04-13 PROCEDURE — 74011250636 HC RX REV CODE- 250/636: Performed by: NURSE PRACTITIONER

## 2018-04-13 PROCEDURE — 74011636637 HC RX REV CODE- 636/637: Performed by: SURGERY

## 2018-04-13 PROCEDURE — 74011000250 HC RX REV CODE- 250: Performed by: SURGERY

## 2018-04-13 PROCEDURE — 3331090001 HH PPS REVENUE CREDIT

## 2018-04-13 PROCEDURE — 74011636637 HC RX REV CODE- 636/637: Performed by: NURSE PRACTITIONER

## 2018-04-13 PROCEDURE — 74011250637 HC RX REV CODE- 250/637: Performed by: PHYSICIAN ASSISTANT

## 2018-04-13 PROCEDURE — 36415 COLL VENOUS BLD VENIPUNCTURE: CPT | Performed by: SURGERY

## 2018-04-13 PROCEDURE — 74011250637 HC RX REV CODE- 250/637: Performed by: NURSE PRACTITIONER

## 2018-04-13 PROCEDURE — 74011250637 HC RX REV CODE- 250/637: Performed by: PHYSICAL MEDICINE & REHABILITATION

## 2018-04-13 PROCEDURE — 84100 ASSAY OF PHOSPHORUS: CPT | Performed by: SURGERY

## 2018-04-13 PROCEDURE — 85027 COMPLETE CBC AUTOMATED: CPT | Performed by: SURGERY

## 2018-04-13 PROCEDURE — 3331090002 HH PPS REVENUE DEBIT

## 2018-04-13 PROCEDURE — 97530 THERAPEUTIC ACTIVITIES: CPT

## 2018-04-13 PROCEDURE — 74011250637 HC RX REV CODE- 250/637: Performed by: INTERNAL MEDICINE

## 2018-04-13 RX ADMIN — ENOXAPARIN SODIUM 40 MG: 100 INJECTION SUBCUTANEOUS at 10:01

## 2018-04-13 RX ADMIN — Medication 10 ML: at 16:20

## 2018-04-13 RX ADMIN — ASPIRIN 325 MG: 325 TABLET ORAL at 10:02

## 2018-04-13 RX ADMIN — DRONABINOL 2.5 MG: 2.5 CAPSULE ORAL at 18:00

## 2018-04-13 RX ADMIN — Medication 10 ML: at 10:00

## 2018-04-13 RX ADMIN — MORPHINE SULFATE 5 MG: 4 INJECTION, SOLUTION INTRAMUSCULAR; INTRAVENOUS at 03:16

## 2018-04-13 RX ADMIN — MORPHINE SULFATE 115 MG: 15 TABLET, EXTENDED RELEASE ORAL at 23:26

## 2018-04-13 RX ADMIN — MORPHINE SULFATE 45 MG: 30 TABLET ORAL at 01:12

## 2018-04-13 RX ADMIN — MORPHINE SULFATE 100 MG: 100 TABLET, FILM COATED, EXTENDED RELEASE ORAL at 09:22

## 2018-04-13 RX ADMIN — PIPERACILLIN AND TAZOBACTAM 3.38 G: 3; .375 INJECTION, POWDER, FOR SOLUTION INTRAVENOUS at 01:12

## 2018-04-13 RX ADMIN — MORPHINE SULFATE 45 MG: 30 TABLET ORAL at 10:01

## 2018-04-13 RX ADMIN — Medication 10 ML: at 06:43

## 2018-04-13 RX ADMIN — MORPHINE SULFATE 45 MG: 30 TABLET ORAL at 03:51

## 2018-04-13 RX ADMIN — ASCORBIC ACID: 500 INJECTION, SOLUTION INTRAMUSCULAR; INTRAVENOUS; SUBCUTANEOUS at 20:17

## 2018-04-13 RX ADMIN — I.V. FAT EMULSION 250 ML: 20 EMULSION INTRAVENOUS at 20:50

## 2018-04-13 RX ADMIN — MORPHINE SULFATE 45 MG: 30 TABLET ORAL at 06:43

## 2018-04-13 RX ADMIN — DRONABINOL 2.5 MG: 2.5 CAPSULE ORAL at 10:02

## 2018-04-13 RX ADMIN — PIPERACILLIN AND TAZOBACTAM 3.38 G: 3; .375 INJECTION, POWDER, FOR SOLUTION INTRAVENOUS at 10:01

## 2018-04-13 RX ADMIN — MORPHINE SULFATE 45 MG: 30 TABLET ORAL at 22:04

## 2018-04-13 RX ADMIN — MORPHINE SULFATE 100 MG: 100 TABLET, FILM COATED, EXTENDED RELEASE ORAL at 16:19

## 2018-04-13 RX ADMIN — Medication 10 ML: at 22:03

## 2018-04-13 RX ADMIN — Medication 10 ML: at 14:00

## 2018-04-13 RX ADMIN — Medication 10 ML: at 22:04

## 2018-04-13 RX ADMIN — INSULIN LISPRO 10 UNITS: 100 INJECTION, SOLUTION INTRAVENOUS; SUBCUTANEOUS at 09:23

## 2018-04-13 RX ADMIN — MORPHINE SULFATE 5 MG: 4 INJECTION, SOLUTION INTRAMUSCULAR; INTRAVENOUS at 18:14

## 2018-04-13 RX ADMIN — MORPHINE SULFATE 45 MG: 30 TABLET ORAL at 14:07

## 2018-04-13 RX ADMIN — MORPHINE SULFATE 45 MG: 30 TABLET ORAL at 18:25

## 2018-04-13 RX ADMIN — MORPHINE SULFATE 5 MG: 4 INJECTION, SOLUTION INTRAMUSCULAR; INTRAVENOUS at 10:01

## 2018-04-13 RX ADMIN — PIPERACILLIN AND TAZOBACTAM 3.38 G: 3; .375 INJECTION, POWDER, FOR SOLUTION INTRAVENOUS at 18:15

## 2018-04-13 RX ADMIN — MORPHINE SULFATE 5 MG: 4 INJECTION, SOLUTION INTRAMUSCULAR; INTRAVENOUS at 16:20

## 2018-04-13 NOTE — PROGRESS NOTES
Called for a new bed for patient as hers is not functioning properly. (total care bariatric plus).   Reference number:  [id number]

## 2018-04-13 NOTE — PROGRESS NOTES
Note that the patient had an open jejunostomy tube placement under fluoroscopy guidance, Wound VAC placement yesterday. Patient's TPN will be renewed today. [facility] is continuing to follow this patient, but cannot accept her on TPN. SOCORRO Frazier#[phone] met with the patient again today. Care Management will continue to follow her disposition.    ROBERT Weems

## 2018-04-13 NOTE — PROGRESS NOTES
Bedside shift change report given to Peggy (oncoming nurse) by Tre Contreras (offgoing nurse). Report included the following information SBAR, Kardex, Intake/Output, MAR and Recent Results.

## 2018-04-13 NOTE — DIABETES MGMT
DTC Progress Note    Recommendations/ Comments: Chart reviewed due to severe hyperglycemia, most likely due to one time dose of dexamethasone and TPN, but improved pre-lunch (155 mg/dL). Noted that tube feedings have been started in addition to TPN. Patient may require additional insulin with tube feeds. Current hospital DM medication:  Lispro resistant correction scale  TPN with  - cyclic 7 PM to 7 AM. Contains 38 units/L, total 91 units      DTC to follow. Chart reviewed on [facility]. Patient is a 38 y.o. female with known diabetes on Humalog correction scale at home. A1c:   No results found for: HBA1C, HGBE8, EXU2RGMB, GYW6WFMI    Recent Glucose Results:   Lab Results   Component Value Date/Time     (H) 04/13/2018 06:44 AM    GLUCPOC 155 (H) 04/13/2018 12:15 PM    GLUCPOC 406 (H) 04/13/2018 06:38 AM    GLUCPOC 350 (H) 04/12/2018 11:13 PM        Lab Results   Component Value Date/Time    Creatinine 0.82 04/13/2018 06:44 AM     Estimated Creatinine Clearance: 132.9 mL/min (based on Cr of 0.82). Active Orders   Diet    DIET MECHANICAL SOFT        PO intake:   Patient Vitals for the past 72 hrs:   % Diet Eaten   04/10/18 1557 75 %   04/10/18 1420 50 %       Will continue to follow as needed.     Thank you  Lesli Calero, MS, RN, CDE

## 2018-04-13 NOTE — PROGRESS NOTES
Primary Nurse Elvira Garza RN and MAURI Vicente performed a dual skin assessment on this patient Impairment noted- see wound doc flow sheet  Amador score is 18

## 2018-04-13 NOTE — PROGRESS NOTES
Progress Note    Patient: Gray Buchanan MRN: [id number]  SSN: [id number]    YOB: 1975  Age: 38 y.o. Sex: female      Admit Date: 3/7/2018    1 Day Post-Op    Procedure:  Procedure(s):  OPEN JEJUNOSTOMY TUBE PLACEMENT UNDER FLURO/ WOUND VAC CHANGE. Subjective:     No acute surgical issues. Pt reported some soreness at wound site. No nausea or vomiting. Ostomy is productive. Midline wound with bilious drainage. Leukocytosis and hyperglycemia most likely from steroid dose in OR yesterday. Objective:     Visit Vitals    BP 91/56    Pulse 93    Temp 98.8 °F (37.1 °C)    Resp 18    Ht 5' 4\" (1.626 m)    Wt 328 lb (148.8 kg)    SpO2 98%    BMI 56.3 kg/m2       Temp (24hrs), Av.2 °F (36.8 °C), Min:97.8 °F (36.6 °C), Max:98.8 °F (37.1 °C)        Physical Exam:    Gen:  NAD  Pulm:  Unlabored  Abd:  S/ND/appropriate TTP  Wound:  C/D/I  Midline wound:  Wound vac intact. Wound appliance with bilious drainage. Ostomy is productive.     Recent Results (from the past 24 hour(s))   GLUCOSE, POC    Collection Time: 18 11:34 AM   Result Value Ref Range    Glucose (POC) 128 (H) 65 - 100 mg/dL    Performed by Robbi Alcaraz, POC    Collection Time: 18  7:06 PM   Result Value Ref Range    Glucose (POC) 170 (H) 65 - 100 mg/dL    Performed by SALLY FORD(CON)    GLUCOSE, POC    Collection Time: 18 10:03 PM   Result Value Ref Range    Glucose (POC) 314 (H) 65 - 100 mg/dL    Performed by Lynette Delgadillo (PCT)    GLUCOSE, POC    Collection Time: 18 11:13 PM   Result Value Ref Range    Glucose (POC) 350 (H) 65 - 100 mg/dL    Performed by Katherine Dubois, POC    Collection Time: 18  6:38 AM   Result Value Ref Range    Glucose (POC) 406 (H) 65 - 100 mg/dL    Performed by Lynette Delgadillo (PCT)    CBC W/O DIFF    Collection Time: 18  6:44 AM   Result Value Ref Range    WBC 19.1 (H) 3.6 - 11.0 K/uL    RBC 2.57 (L) 3.80 - 5.20 M/uL    HGB 7.8 (L) 11.5 - 16.0 g/dL    HCT 25.2 (L) 35.0 - 47.0 %    MCV 98.1 80.0 - 99.0 FL    MCH 30.4 26.0 - 34.0 PG    MCHC 31.0 30.0 - 36.5 g/dL    RDW 18.6 (H) 11.5 - 14.5 %    PLATELET 548 381 - 968 K/uL    MPV 9.6 8.9 - 12.9 FL    NRBC 0.0 0  WBC    ABSOLUTE NRBC 0.00 0.00 - 6.06 K/uL   METABOLIC PANEL, BASIC    Collection Time: 04/13/18  6:44 AM   Result Value Ref Range    Sodium 133 (L) 136 - 145 mmol/L    Potassium 4.8 3.5 - 5.1 mmol/L    Chloride 104 97 - 108 mmol/L    CO2 21 21 - 32 mmol/L    Anion gap 8 5 - 15 mmol/L    Glucose 364 (H) 65 - 100 mg/dL    BUN 29 (H) 6 - 20 MG/DL    Creatinine 0.82 0.55 - 1.02 MG/DL    BUN/Creatinine ratio 35 (H) 12 - 20      GFR est AA >60 >60 ml/min/1.73m2    GFR est non-AA >60 >60 ml/min/1.73m2    Calcium 8.8 8.5 - 10.1 MG/DL   MAGNESIUM    Collection Time: 04/13/18  6:44 AM   Result Value Ref Range    Magnesium 2.1 1.6 - 2.4 mg/dL   PHOSPHORUS    Collection Time: 04/13/18  6:44 AM   Result Value Ref Range    Phosphorus 3.6 2.6 - 4.7 MG/DL           Assessment:     Hospital Problems  Date Reviewed: 10/27/2017          Codes Class Noted POA    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

## 2018-04-13 NOTE — PROGRESS NOTES
Problem: Mobility Impaired (Adult and Pediatric)  Goal: *Acute Goals and Plan of Care (Insert Text)  Physical Therapy Goals  Update 4/6/2018  1. Patient will move from supine to sit and sit to supine  in bed with moderate assistance  within 7 day(s). 2.  Patient will transfer from bed to chair and chair to bed with moderate assistance  using the least restrictive device within 7 day(s). 3.  Patient will perform sit to stand with CGA within 7 days  4. Patient will ambulate 22' with moderate assist x 1 using LRAD within 7 days. Revised 3/27/2018  1. Patient will move from supine to sit and sit to supine  in bed with moderate assistance  within 7 day(s). 2.  Patient will transfer from bed to chair and chair to bed with moderate assistance  using the least restrictive device within 7 day(s). 3.  Patient will perform sit to stand with moderate assistance  within 7 day(s). 4.  Patient will ambulate 100' with moderate assist x1 using the least restrictive device within 7 day(s). Revisited 3/19/2018, goals remain appropriate, carry over    Physical Therapy Goals  Initiated 3/8/2018  1. Patient will move from supine to sit and sit to supine  in bed with moderate assistance  within 7 day(s). 2.  Patient will transfer from bed to chair and chair to bed with moderate assistance  using the least restrictive device within 7 day(s). 3.  Patient will perform sit to stand with moderate assistance  within 7 day(s). 4.  PT will assess gait with the least restrictive device within 7 day(s). physical Therapy TREATMENT  Patient: Lorna Hernandez (38 y.o. female)  Date: 4/13/2018  Diagnosis: FISTULA  Small bowel fistula  INTEROCUTANOUS FISTULA <principal problem not specified>  Procedure(s) (LRB):  OPEN JEJUNOSTOMY TUBE PLACEMENT UNDER FLURO/ WOUND VAC CHANGE. (N/A) 1 Day Post-Op  Precautions: Fall, Skin  Chart, physical therapy assessment, plan of care and goals were reviewed.     ASSESSMENT:  Pt leilani durham great progression with gait tolerance. Pt was able to ambulate in the hallway reporting tolerable pain. At this time utilized chair position from bed to stand. Will progress to bed mobility to EOB. Pt is tolerating sitting in the chair. Pt was able to step on the scale. Pt continues to express anxiety with the scale due to a previous fall with task. Pt is making great progress. Pt may benefit from inpt rehab to progress strength and activity tolerance to optimize independence. Progression toward goals:  [x]    Improving appropriately and progressing toward goals  []    Improving slowly and progressing toward goals  []    Not making progress toward goals and plan of care will be adjusted     PLAN:  Patient continues to benefit from skilled intervention to address the above impairments. Continue treatment per established plan of care. Discharge Recommendations:  Inpatient Rehab  Further Equipment Recommendations for Discharge:  TBD     SUBJECTIVE:   Patient stated I want to walk in the dick.     OBJECTIVE DATA SUMMARY:   Critical Behavior:  Neurologic State: Alert  Orientation Level: Oriented X4  Cognition: Follows commands  Safety/Judgement: Good awareness of safety precautions  Functional Mobility Training:  Bed Mobility:                    Transfers:  Sit to Stand: Contact guard assistance  Stand to Sit: Contact guard assistance                             Balance:  Sitting: Intact; With support  Standing: Impaired  Standing - Static: Good  Standing - Dynamic : Fair  Ambulation/Gait Training:  Distance (ft): 80 Feet (ft)  Assistive Device: Walker, rolling  Ambulation - Level of Assistance: Stand-by assistance;Contact guard assistance        Gait Abnormalities: Decreased step clearance        Base of Support: Widened     Speed/Liz: Slow  Step Length: Left shortened;Right shortened                   Stairs:              Neuro Re-Education:    Therapeutic Exercises:     Pain:  Pain Scale 1: Numeric (0 - 10)  Pain Intensity 1: 7  Pain Location 1: Abdomen  Pain Orientation 1: Mid  Pain Description 1: Sharp  Pain Intervention(s) 1: Medication (see MAR)  Activity Tolerance:   Limited   Please refer to the flowsheet for vital signs taken during this treatment.   After treatment:   [x]    Patient left in no apparent distress sitting up in chair  []    Patient left in no apparent distress in bed  [x]    Call bell left within reach  [x]    Nursing notified  [x]    Caregiver present  []    Bed alarm activated    COMMUNICATION/COLLABORATION:   The patients plan of care was discussed with: Registered Nurse    Fatmata Mena PTA   Time Calculation: 30 mins

## 2018-04-13 NOTE — ANESTHESIA POSTPROCEDURE EVALUATION
Post-Anesthesia Evaluation and Assessment    Patient: Chilo Medina MRN: [id number]  SSN: [id number]    YOB: 1975  Age: 38 y.o. Sex: female       Cardiovascular Function/Vital Signs  Visit Vitals    /62    Pulse 91    Temp 36.7 °C (98.1 °F)    Resp 16    Ht 5' 4\" (1.626 m)    Wt 148.8 kg (328 lb)    SpO2 100%    BMI 56.3 kg/m2       Patient is status post MAC anesthesia for Procedure(s):  OPEN JEJUNOSTOMY TUBE PLACEMENT UNDER FLURO/ WOUND VAC CHANGE. Ruth Alexander Nausea/Vomiting: None    Postoperative hydration reviewed and adequate. Pain:  Pain Scale 1: Numeric (0 - 10) (04/13/18 0641)  Pain Intensity 1: 7 (04/13/18 0641)   Managed    Neurological Status:   Neuro (WDL): Within Defined Limits (04/12/18 1509)  Neuro  Neurologic State: Alert (04/12/18 2153)  Orientation Level: Oriented X4 (04/12/18 2153)  Cognition: Follows commands (04/12/18 2153)  LUE Motor Response: Purposeful (04/12/18 2153)  LLE Motor Response: Tingling (04/12/18 2153)  RUE Motor Response: Purposeful (04/12/18 2153)  RLE Motor Response: Purposeful (04/12/18 2153)   At baseline    Mental Status and Level of Consciousness: Arousable    Pulmonary Status:   O2 Device: Room air (04/13/18 0032)   Adequate oxygenation and airway patent    Complications related to anesthesia: None    Post-anesthesia assessment completed.  No concerns    Signed By: Madhu Hernandez MD     April 13, 2018

## 2018-04-13 NOTE — OP NOTES
[facility] OP NOTE    Desmond Leone  MR#: [id number]  : 1975  ACCOUNT #: [de-identified]   DATE OF SERVICE: 2018    ATTENDING:  Gareth Mcqueen MD    PREOPERATIVE DIAGNOSIS:  Enterocutaneous fistula. POSTOPERATIVE DIAGNOSIS:  Enterocutaneous fistula. PROCEDURE PERFORMED:  Open jejunostomy tube placement under fluoroscopy guidance, Wound VAC placement. PRIMARY SURGEON:  Gareth Mcqueen MD    ASSISTANT:  Denise Tucker    ANESTHESIA:  Monitored anesthesia care. ESTIMATED BLOOD LOSS:  Minimal.    SPECIMENS REMOVED:  None. FINDINGS:  There was a proximal enterocutaneous fistula on the left abdomen wound edge, the distal fistula was on the right side. A 20-Armenian red rubber feeding tube was placed into the distal fistula. This was confirmed with Gastrografin under fluoroscopy. COMPLICATIONS:  None. IMPLANTS:  None. DRAINS AND TUBES:  A 20-Armenian red rubber feeding tube was placed into the distal jejunum. INDICATION FOR OPERATION:  The patient is a 77-year-old woman with a history of Crohn's disease who underwent a colectomy with an end ileostomy. She subsequently underwent an operation for perforated bowel and developed an enterocutaneous fistula after a second operation for ischemic bowel. She was admitted during this hospitalization for takedown of her fistula. Unfortunately, the fistula recurred after the takedown. She is being taken to the operating room today for a feeding tube placement due to this through the distal side of the enterocutaneous fistula. PROCEDURE IN DETAIL:  After informed consent was obtained from the patient, the patient was taken to the operating room and placed in supine position on the operating table. She underwent monitored anesthesia care without any complication. The previously placed Wound VAC was then taken down. The abdomen and fistula site was prepped and draped in the usual sterile surgical fashion. A surgical timeout was performed. After the timeout was performed, we interrogated the fistula with digital palpation. The proximal end of the fistula appears to be on the left side of the abdomen. A tube was placed into this fistula and we noted enteric content flowing from this end of the fistula. We then proceeded to locate the distal end of the fistula. On the right side of the abdomen, there was a tract consistent with small bowel fistula. A 20-Chinese red rubber feeding tube was placed into this tract. Approximately 20 cm of the red rubber was advanced into this tract. We then shot fluoroscopy using Gastrografin. The feeding tube appears to be in the lumen of the small bowel. At this point, the feeding tube was then secured to the fascia using 0 Vicryl. After this was secured, we then had the wound care nurse come in and apply an ostomy appliance, as well as a Wound VAC appliance over the wound site. The patient tolerated the procedure well. There were no complications associated with the operation. Dr. Lachelle Kenyon, the attending surgeon, was present during the entirety of the case. All lap, needle and instrument count were correct x2. The patient was awakened from monitored anesthesia care and was transported to the PACU in stable condition.       MD DIMAS Martel /   D: 04/13/2018 00:04     T: 04/13/2018 08:17  JOB #: [id number]

## 2018-04-13 NOTE — PROGRESS NOTES
Patient's BG elevated overnight, awaiting MD order for sliding scale this am.     Bedside shift change report given to [id number] (oncoming nurse) by Justice Kamara RN (offgoing nurse). Report included the following information SBAR, Kardex, Procedure Summary, Intake/Output, MAR, Accordion and Recent Results.

## 2018-04-13 NOTE — PROGRESS NOTES
NUTRITION       Brief note. Chart reviewed, discussed with RN. Pt s/p open jejunostomy tube placement in OR yesterday. Plan is to start feeding today. Feeds initiated during visit. The pt states she felt some pain on initiate flush, but had no pain once feedings were infusing. Discussed the plan to continue TPN for the time being until tube feeding tolerance can be established (it's unclear at this point how much she will be able to absorb). She did have ~30-50 mL in ileostomy bag prior to initiation of feedings and fistula bag continues to fill per her level of oral intake. Discussed that much of her gastric juices are draining into her fistula, so this may also inhibit digestion/absorption via J-tube. Per pt, the plan is for UGI series (vs gastrograffin study via J-tube) on Monday to further evaluate length of remaining bowel and absorption. Tube feeding ordered to progress as recommended (Peptamen 1.5 @ 20 ml/hr and advance q12 hours to goal of 60 mL/hr + 50 ml flush q 6 hours). Once tolerance established, add Prosource Liquid BID. TPN to continue at goal (likely through the weekend). Would hesitate to discontinue TPN until tube feeding tolerance and absorption can be further evaluated. If weaning is desired, could consider weaning to 2L (70 mL/hr x 1 hour; 150 x 12 hours; 70 mL/hr x last hour), but would not wean further until study on Monday. Appreciate standing scale weights as they remain ~25-30# lower than bedscale!      Last 3 Recorded Weights in this Encounter    04/11/18 0704 04/12/18 0952 04/13/18 1406   Weight: (!) 161.1 kg (355 lb 2.6 oz)-- bed 148.8 kg (328 lb)--standing 148.6 kg (327 lb 8 oz)--standing      Estimated Nutrition Needs:   Kcals/day: 1649 Kcals/day (4352-4933 kcal/day (11-15 kcal/kg))  Protein: 120 g (120-150 kcal/day (0.8-1 g/kg))  Fluid: 2250 ml (or 1 mL/kcal or per output)     Based On: Kcal/kg - specify (Comment)  Weight Used: Actual wt (149.9 kg)    RD to follow.     [address]

## 2018-04-13 NOTE — PROGRESS NOTES
Problem: Self Care Deficits Care Plan (Adult)  Goal: *Acute Goals and Plan of Care (Insert Text)  Occupational Therapy Goals  Reevaluated 4/10/2018  1. Patient will complete grooming activity sitting EOB without support for 10 minutes within 7 days. 2.  Patient will complete BSC transfer with min A x 2 persons within 7 days. 3.  Patient will complete lower body dressing activities with min A within 7 days. 4.  Patient will perform standing for clothing management tasks (toileting or dressing) with min A x 2 persons within 7 days. 5.  Patient will complete UE exercise program independently within 7 days. Initiated 4/4/2018  1. Patient will perform rolling and pulling self up to head of bed with min assist to assist with ADL's at bed level within 7 day(s). 2.  Patient will perform static sitting edge of bed > or = 10 minutes with supervision/set-up within 7 day(s). 3.  Patient will perform static standing with bilateral UE support on rolling walker > or = 3 minutes with minimal assistance/contact guard assist x's 2 within 7 day(s). 4.  Patient will perform toilet transfers with moderate assistance x's 2 to bedside commode within 7 days. 5.  Patient will perform bilateral UE AROM and strengthening exercises throughout day and grade exercises prn to increase demand within 7 day(s). Occupational Therapy TREATMENT  Patient: Acacia Almeida (38 y.o. female)  Date: 4/13/2018  Diagnosis: FISTULA  Small bowel fistula  INTEROCUTANOUS FISTULA <principal problem not specified>  Procedure(s) (LRB):  OPEN JEJUNOSTOMY TUBE PLACEMENT UNDER FLURO/ WOUND VAC CHANGE. (N/A) 1 Day Post-Op  Precautions: Fall, Skin  Chart, occupational therapy assessment, plan of care, and goals were reviewed. ASSESSMENT:  Patient just finished walking in hallway with PT and declined to perform toileting activity.   Now that patient has progressed with ambulation,  Discussed patient about ambulating to bathroom to further improved her endurance and to increase her confidence. The only concern is the size of the bathroom with her currrent walker. Simulated transfer using RW with mother present and thought it could work. Reviewed theraband exercises and patient given a few more additional exercises. Demonstrated and verbalized understanding. Discussed current bathroom at home which patient has been having a difficult time with sit to stand with one grab bar. Patient rigo picture of current bathroom and therapist suggested placing another grab bar in front of her so that could assist with sit to stand. Patient/mother  agreed it would work. Also, recommended purchasing hip kit from [employer] to start practicing using the AE on a daily basis for LB self-care. Progression toward goals:  []       Improving appropriately and progressing toward goals  [x]       Improving slowly and progressing toward goals  []       Not making progress toward goals and plan of care will be adjusted     PLAN:  Patient continues to benefit from skilled intervention to address the above impairments. Continue treatment per established plan of care. Discharge Recommendations:  Rehab  Further Equipment Recommendations for Discharge:  TBD by rehab     SUBJECTIVE:   Patient stated I've been working really hard.     OBJECTIVE DATA SUMMARY:   Cognitive/Behavioral Status:  Neurologic State: Alert  Orientation Level: Oriented X4                ADL Intervention:         * Discussed bathroom at home and made further recommendations to increase independence and safety. * Discussed and reviewed AE for LB self-care. Encouraged family to purchase hip kit from [employer] to start using AE as part of her dressing routinee                                      Therapeutic Exercises:   Reviewed current theraband exercises and added additional exercises to further improve her UB strength.  Demonstrated and verbalized understanding  Pain:  Pain Scale 1: Numeric (0 - 10)  Pain Intensity 1: 7  Pain Location 1: Abdomen  Pain Orientation 1: Mid  Pain Description 1: Sharp  Pain Intervention(s) 1: Medication (see MAR)  Activity Tolerance:   Good  Please refer to the flowsheet for vital signs taken during this treatment.   After treatment:   [x] Patient left in no apparent distress sitting up in chair  [] Patient left in no apparent distress in bed  [x] Call bell left within reach  [] Nursing notified  [x] Caregiver present  [] Bed alarm activated    COMMUNICATION/COLLABORATION:   The patients plan of care was discussed with: Physical Therapist    Gabriella Gonzalez OTR/L  Time Calculation: 28 mins

## 2018-04-14 LAB
GLUCOSE BLD STRIP.AUTO-MCNC: 158 MG/DL (ref 65–100)
GLUCOSE BLD STRIP.AUTO-MCNC: 239 MG/DL (ref 65–100)
GLUCOSE BLD STRIP.AUTO-MCNC: 93 MG/DL (ref 65–100)
SERVICE CMNT-IMP: ABNORMAL
SERVICE CMNT-IMP: ABNORMAL
SERVICE CMNT-IMP: NORMAL

## 2018-04-14 PROCEDURE — 74011000258 HC RX REV CODE- 258: Performed by: NURSE PRACTITIONER

## 2018-04-14 PROCEDURE — 74011250637 HC RX REV CODE- 250/637: Performed by: PHYSICIAN ASSISTANT

## 2018-04-14 PROCEDURE — 82962 GLUCOSE BLOOD TEST: CPT

## 2018-04-14 PROCEDURE — 74011250636 HC RX REV CODE- 250/636: Performed by: SURGERY

## 2018-04-14 PROCEDURE — 74011250637 HC RX REV CODE- 250/637: Performed by: INTERNAL MEDICINE

## 2018-04-14 PROCEDURE — 74011250636 HC RX REV CODE- 250/636: Performed by: NURSE PRACTITIONER

## 2018-04-14 PROCEDURE — 74011636637 HC RX REV CODE- 636/637: Performed by: NURSE PRACTITIONER

## 2018-04-14 PROCEDURE — 74011000250 HC RX REV CODE- 250: Performed by: NURSE PRACTITIONER

## 2018-04-14 PROCEDURE — 74011250637 HC RX REV CODE- 250/637: Performed by: PHYSICAL MEDICINE & REHABILITATION

## 2018-04-14 PROCEDURE — 3331090002 HH PPS REVENUE DEBIT

## 2018-04-14 PROCEDURE — 3331090001 HH PPS REVENUE CREDIT

## 2018-04-14 PROCEDURE — 74011250636 HC RX REV CODE- 250/636: Performed by: PHYSICAL MEDICINE & REHABILITATION

## 2018-04-14 PROCEDURE — 65270000029 HC RM PRIVATE

## 2018-04-14 PROCEDURE — 74011000258 HC RX REV CODE- 258: Performed by: SURGERY

## 2018-04-14 PROCEDURE — 74011250637 HC RX REV CODE- 250/637: Performed by: NURSE PRACTITIONER

## 2018-04-14 RX ADMIN — ENOXAPARIN SODIUM 40 MG: 100 INJECTION SUBCUTANEOUS at 09:53

## 2018-04-14 RX ADMIN — MORPHINE SULFATE 45 MG: 30 TABLET ORAL at 21:58

## 2018-04-14 RX ADMIN — MORPHINE SULFATE 5 MG: 4 INJECTION, SOLUTION INTRAMUSCULAR; INTRAVENOUS at 22:49

## 2018-04-14 RX ADMIN — MORPHINE SULFATE 100 MG: 100 TABLET, FILM COATED, EXTENDED RELEASE ORAL at 17:03

## 2018-04-14 RX ADMIN — MORPHINE SULFATE 5 MG: 4 INJECTION, SOLUTION INTRAMUSCULAR; INTRAVENOUS at 09:52

## 2018-04-14 RX ADMIN — Medication 10 ML: at 17:05

## 2018-04-14 RX ADMIN — ASCORBIC ACID: 500 INJECTION, SOLUTION INTRAMUSCULAR; INTRAVENOUS; SUBCUTANEOUS at 20:25

## 2018-04-14 RX ADMIN — MORPHINE SULFATE 45 MG: 30 TABLET ORAL at 04:22

## 2018-04-14 RX ADMIN — MORPHINE SULFATE 5 MG: 4 INJECTION, SOLUTION INTRAMUSCULAR; INTRAVENOUS at 00:05

## 2018-04-14 RX ADMIN — ASCORBIC ACID: 500 INJECTION, SOLUTION INTRAMUSCULAR; INTRAVENOUS; SUBCUTANEOUS at 18:30

## 2018-04-14 RX ADMIN — PIPERACILLIN AND TAZOBACTAM 3.38 G: 3; .375 INJECTION, POWDER, FOR SOLUTION INTRAVENOUS at 17:02

## 2018-04-14 RX ADMIN — Medication 10 ML: at 17:04

## 2018-04-14 RX ADMIN — MORPHINE SULFATE 100 MG: 100 TABLET, FILM COATED, EXTENDED RELEASE ORAL at 08:19

## 2018-04-14 RX ADMIN — PIPERACILLIN AND TAZOBACTAM 3.38 G: 3; .375 INJECTION, POWDER, FOR SOLUTION INTRAVENOUS at 09:52

## 2018-04-14 RX ADMIN — DRONABINOL 2.5 MG: 2.5 CAPSULE ORAL at 09:52

## 2018-04-14 RX ADMIN — PIPERACILLIN AND TAZOBACTAM 3.38 G: 3; .375 INJECTION, POWDER, FOR SOLUTION INTRAVENOUS at 01:06

## 2018-04-14 RX ADMIN — MORPHINE SULFATE 45 MG: 30 TABLET ORAL at 18:29

## 2018-04-14 RX ADMIN — MORPHINE SULFATE 5 MG: 4 INJECTION, SOLUTION INTRAMUSCULAR; INTRAVENOUS at 19:47

## 2018-04-14 RX ADMIN — DRONABINOL 2.5 MG: 2.5 CAPSULE ORAL at 18:29

## 2018-04-14 RX ADMIN — MORPHINE SULFATE 45 MG: 30 TABLET ORAL at 15:10

## 2018-04-14 RX ADMIN — Medication 10 ML: at 11:38

## 2018-04-14 RX ADMIN — MORPHINE SULFATE 45 MG: 30 TABLET ORAL at 11:37

## 2018-04-14 RX ADMIN — PROCHLORPERAZINE EDISYLATE 5 MG: 5 INJECTION INTRAMUSCULAR; INTRAVENOUS at 10:25

## 2018-04-14 RX ADMIN — MORPHINE SULFATE 115 MG: 15 TABLET, EXTENDED RELEASE ORAL at 23:20

## 2018-04-14 RX ADMIN — ASPIRIN 325 MG: 325 TABLET ORAL at 09:52

## 2018-04-14 RX ADMIN — MORPHINE SULFATE 45 MG: 30 TABLET ORAL at 01:06

## 2018-04-14 NOTE — PROGRESS NOTES
Pt. Refused feeding and stated that her wound vac was not working. Wound was intact and suction was working. Tube feeding stopped because refused at this time.

## 2018-04-14 NOTE — PROGRESS NOTES
Wound vac lost suction. Dr. Kirstie Walton came and helped with it. Nurse replaced some of the clear tape/changed the fistula ostomy pouch. Pt and her mother assisted. Pt stable, all is intact, suction working.

## 2018-04-14 NOTE — PROGRESS NOTES
General Surgery Daily Progress Note    Admit Date: 3/7/2018  Post-Operative Day: 2 Days Post-Op from Procedure(s):  OPEN JEJUNOSTOMY TUBE PLACEMENT UNDER FLURO/ WOUND VAC CHANGE. Subjective:     Last 24 hrs: Wound vac appears to not be working and leaking around site on 3 sides   TF off, on TPN and mech soft diet    Objective:     Blood pressure 96/73, pulse (!) 109, temperature 97.7 °F (36.5 °C), resp. rate 18, height 5' 4\" (1.626 m), weight 327 lb 8 oz (148.6 kg), SpO2 100 %. Temp (24hrs), Av.8 °F (36.6 °C), Min:97.7 °F (36.5 °C), Max:97.9 °F (36.6 °C)      _____________________  Physical Exam:     Alert and Oriented, x3, in no acute distress. 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         ______________________  Medications:    Current Facility-Administered Medications   Medication Dose Route Frequency    piperacillin-tazobactam (ZOSYN) 3.375 g in 0.9% sodium chloride (MBP/ADV) 100 mL  3.375 g IntraVENous Q8H    0.9% sodium chloride infusion 250 mL  250 mL IntraVENous PRN    morphine injection 5 mg  5 mg IntraVENous DAILY    insulin lispro (HUMALOG) injection   SubCUTAneous BID    morphine IR (MS IR) tablet 45 mg  45 mg Oral Q3H    morphine injection 5 mg  5 mg IntraVENous Q2H PRN    morphine CR (MS CONTIN) tablet 115 mg  115 mg Oral QHS    morphine CR (MS CONTIN) tablet 100 mg  100 mg Oral Q24H    morphine CR (MS CONTIN) tablet 100 mg  100 mg Oral Q24H    aspirin (ASPIRIN) tablet 325 mg  325 mg Oral DAILY    0.9% sodium chloride infusion 250 mL  250 mL IntraVENous PRN    acetaminophen (TYLENOL) tablet 650 mg  650 mg Oral Q6H PRN    diphenhydrAMINE (BENADRYL) capsule 25 mg  25 mg Oral Q6H PRN    enoxaparin (LOVENOX) injection 40 mg  40 mg SubCUTAneous Q24H    fat emulsion 20% (LIPOSYN, INTRAlipid) infusion 250 mL  250 mL IntraVENous Q MON, WED & FRI    nystatin (MYCOSTATIN) 100,000 unit/gram cream   Topical BID    prochlorperazine (COMPAZINE) with saline injection 5 mg  5 mg IntraVENous Q6H PRN    naloxone (NARCAN) injection 0.4 mg  0.4 mg IntraVENous EVERY 2 MINUTES AS NEEDED    glucose chewable tablet 16 g  4 Tab Oral PRN    dextrose (D50W) injection syrg 12.5-25 g  12.5-25 g IntraVENous PRN    glucagon (GLUCAGEN) injection 1 mg  1 mg IntraMUSCular PRN    scopolamine (TRANSDERM-SCOP) 1 mg over 3 days 1 Patch  1 Patch TransDERmal Q72H    sodium chloride (NS) flush 10 mL  10 mL InterCATHeter Q24H    sodium chloride (NS) flush 10 mL  10 mL InterCATHeter Q8H    alteplase (CATHFLO) 1 mg in sterile water (preservative free) 1 mL injection  1 mg InterCATHeter PRN    bacitracin 500 unit/gram packet 1 Packet  1 Packet Topical PRN    sodium chloride (NS) flush 20 mL  20 mL InterCATHeter PRN    sodium chloride (NS) flush 10 mL  10 mL InterCATHeter Q24H    sodium chloride (NS) flush 10 mL  10 mL InterCATHeter PRN    sodium chloride (NS) flush 10 mL  10 mL InterCATHeter Q8H    dronabinol (MARINOL) capsule 2.5 mg  2.5 mg Oral BID    phenol throat spray (CHLORASEPTIC) 1 Spray  1 Spray Oral PRN    ondansetron (ZOFRAN) injection 4 mg  4 mg IntraVENous Q4H PRN    promethazine (PHENERGAN) 12.5 mg in 0.9% sodium chloride 50 mL IVPB  12.5 mg IntraVENous Q6H PRN    LORazepam (ATIVAN) injection 1 mg  1 mg IntraVENous Q6H PRN    albuterol (PROVENTIL VENTOLIN) nebulizer solution 2.5 mg  2.5 mg Nebulization Q4H PRN       Andre Valles NP  4/14/2018    Pt seen and examined  Overall feeling better. Vac has been turned off.there is some drainage from around the pouch where the Tube enters  Abdomen- The vac was turned back on and gentle pressure held on the sponge. There was seal without leakage from the vac.  -plan  Would continue vac for now as I am concerned that guaze dressing will lead to significant drainage  Continue TPN for now.

## 2018-04-15 LAB
ALBUMIN SERPL-MCNC: 1.9 G/DL (ref 3.5–5)
ALBUMIN/GLOB SERPL: 0.4 {RATIO} (ref 1.1–2.2)
ALP SERPL-CCNC: 139 U/L (ref 45–117)
ALT SERPL-CCNC: 41 U/L (ref 12–78)
ANION GAP SERPL CALC-SCNC: 8 MMOL/L (ref 5–15)
AST SERPL-CCNC: 52 U/L (ref 15–37)
BILIRUB SERPL-MCNC: 0.4 MG/DL (ref 0.2–1)
BUN SERPL-MCNC: 32 MG/DL (ref 6–20)
BUN/CREAT SERPL: 44 (ref 12–20)
CALCIUM SERPL-MCNC: 8.7 MG/DL (ref 8.5–10.1)
CHLORIDE SERPL-SCNC: 112 MMOL/L (ref 97–108)
CO2 SERPL-SCNC: 18 MMOL/L (ref 21–32)
CREAT SERPL-MCNC: 0.73 MG/DL (ref 0.55–1.02)
ERYTHROCYTE [DISTWIDTH] IN BLOOD BY AUTOMATED COUNT: 19.5 % (ref 11.5–14.5)
GLOBULIN SER CALC-MCNC: 5.4 G/DL (ref 2–4)
GLUCOSE BLD STRIP.AUTO-MCNC: 112 MG/DL (ref 65–100)
GLUCOSE BLD STRIP.AUTO-MCNC: 158 MG/DL (ref 65–100)
GLUCOSE BLD STRIP.AUTO-MCNC: 97 MG/DL (ref 65–100)
GLUCOSE SERPL-MCNC: 125 MG/DL (ref 65–100)
HCT VFR BLD AUTO: 25.9 % (ref 35–47)
HGB BLD-MCNC: 7.9 G/DL (ref 11.5–16)
MAGNESIUM SERPL-MCNC: 1.9 MG/DL (ref 1.6–2.4)
MCH RBC QN AUTO: 30.3 PG (ref 26–34)
MCHC RBC AUTO-ENTMCNC: 30.5 G/DL (ref 30–36.5)
MCV RBC AUTO: 99.2 FL (ref 80–99)
NRBC # BLD: 0.02 K/UL (ref 0–0.01)
NRBC BLD-RTO: 0.2 PER 100 WBC
PHOSPHATE SERPL-MCNC: 4 MG/DL (ref 2.6–4.7)
PLATELET # BLD AUTO: 422 K/UL (ref 150–400)
PMV BLD AUTO: 9.3 FL (ref 8.9–12.9)
POTASSIUM SERPL-SCNC: 4.4 MMOL/L (ref 3.5–5.1)
PROT SERPL-MCNC: 7.3 G/DL (ref 6.4–8.2)
RBC # BLD AUTO: 2.61 M/UL (ref 3.8–5.2)
SERVICE CMNT-IMP: ABNORMAL
SERVICE CMNT-IMP: ABNORMAL
SERVICE CMNT-IMP: NORMAL
SODIUM SERPL-SCNC: 138 MMOL/L (ref 136–145)
WBC # BLD AUTO: 12.9 K/UL (ref 3.6–11)

## 2018-04-15 PROCEDURE — 3331090001 HH PPS REVENUE CREDIT

## 2018-04-15 PROCEDURE — 74011250637 HC RX REV CODE- 250/637: Performed by: NURSE PRACTITIONER

## 2018-04-15 PROCEDURE — 74011000250 HC RX REV CODE- 250: Performed by: SURGERY

## 2018-04-15 PROCEDURE — 74011250637 HC RX REV CODE- 250/637: Performed by: PHYSICAL MEDICINE & REHABILITATION

## 2018-04-15 PROCEDURE — 36415 COLL VENOUS BLD VENIPUNCTURE: CPT | Performed by: NURSE PRACTITIONER

## 2018-04-15 PROCEDURE — 74011636637 HC RX REV CODE- 636/637: Performed by: NURSE PRACTITIONER

## 2018-04-15 PROCEDURE — 82962 GLUCOSE BLOOD TEST: CPT

## 2018-04-15 PROCEDURE — 83735 ASSAY OF MAGNESIUM: CPT | Performed by: NURSE PRACTITIONER

## 2018-04-15 PROCEDURE — 74011000250 HC RX REV CODE- 250: Performed by: NURSE PRACTITIONER

## 2018-04-15 PROCEDURE — 74011250637 HC RX REV CODE- 250/637: Performed by: PHYSICIAN ASSISTANT

## 2018-04-15 PROCEDURE — 65270000029 HC RM PRIVATE

## 2018-04-15 PROCEDURE — 3331090002 HH PPS REVENUE DEBIT

## 2018-04-15 PROCEDURE — 74011250636 HC RX REV CODE- 250/636: Performed by: NURSE PRACTITIONER

## 2018-04-15 PROCEDURE — 74011000258 HC RX REV CODE- 258: Performed by: SURGERY

## 2018-04-15 PROCEDURE — 84100 ASSAY OF PHOSPHORUS: CPT | Performed by: NURSE PRACTITIONER

## 2018-04-15 PROCEDURE — 74011250636 HC RX REV CODE- 250/636: Performed by: PHYSICAL MEDICINE & REHABILITATION

## 2018-04-15 PROCEDURE — 74011250637 HC RX REV CODE- 250/637: Performed by: INTERNAL MEDICINE

## 2018-04-15 PROCEDURE — 80053 COMPREHEN METABOLIC PANEL: CPT | Performed by: NURSE PRACTITIONER

## 2018-04-15 PROCEDURE — 74011000258 HC RX REV CODE- 258: Performed by: NURSE PRACTITIONER

## 2018-04-15 PROCEDURE — 74011250636 HC RX REV CODE- 250/636: Performed by: SURGERY

## 2018-04-15 PROCEDURE — 85027 COMPLETE CBC AUTOMATED: CPT | Performed by: SURGERY

## 2018-04-15 RX ADMIN — MORPHINE SULFATE 100 MG: 100 TABLET, FILM COATED, EXTENDED RELEASE ORAL at 16:30

## 2018-04-15 RX ADMIN — ASPIRIN 325 MG: 325 TABLET ORAL at 09:13

## 2018-04-15 RX ADMIN — MORPHINE SULFATE 45 MG: 30 TABLET ORAL at 21:27

## 2018-04-15 RX ADMIN — MORPHINE SULFATE 5 MG: 4 INJECTION, SOLUTION INTRAMUSCULAR; INTRAVENOUS at 06:47

## 2018-04-15 RX ADMIN — MORPHINE SULFATE 45 MG: 30 TABLET ORAL at 04:44

## 2018-04-15 RX ADMIN — Medication 10 ML: at 09:14

## 2018-04-15 RX ADMIN — MORPHINE SULFATE 5 MG: 4 INJECTION, SOLUTION INTRAMUSCULAR; INTRAVENOUS at 13:27

## 2018-04-15 RX ADMIN — MORPHINE SULFATE 45 MG: 30 TABLET ORAL at 01:24

## 2018-04-15 RX ADMIN — MORPHINE SULFATE 45 MG: 30 TABLET ORAL at 07:32

## 2018-04-15 RX ADMIN — PIPERACILLIN AND TAZOBACTAM 3.38 G: 3; .375 INJECTION, POWDER, FOR SOLUTION INTRAVENOUS at 01:00

## 2018-04-15 RX ADMIN — WATER 1 MG: 1 INJECTION INTRAMUSCULAR; INTRAVENOUS; SUBCUTANEOUS at 10:57

## 2018-04-15 RX ADMIN — ENOXAPARIN SODIUM 40 MG: 100 INJECTION SUBCUTANEOUS at 09:13

## 2018-04-15 RX ADMIN — MORPHINE SULFATE 45 MG: 30 TABLET ORAL at 10:57

## 2018-04-15 RX ADMIN — MORPHINE SULFATE 5 MG: 4 INJECTION, SOLUTION INTRAMUSCULAR; INTRAVENOUS at 09:13

## 2018-04-15 RX ADMIN — MORPHINE SULFATE 115 MG: 15 TABLET, EXTENDED RELEASE ORAL at 23:31

## 2018-04-15 RX ADMIN — Medication 10 ML: at 14:00

## 2018-04-15 RX ADMIN — PIPERACILLIN AND TAZOBACTAM 3.38 G: 3; .375 INJECTION, POWDER, FOR SOLUTION INTRAVENOUS at 21:29

## 2018-04-15 RX ADMIN — MORPHINE SULFATE 5 MG: 4 INJECTION, SOLUTION INTRAMUSCULAR; INTRAVENOUS at 20:15

## 2018-04-15 RX ADMIN — ASCORBIC ACID: 500 INJECTION, SOLUTION INTRAMUSCULAR; INTRAVENOUS; SUBCUTANEOUS at 19:01

## 2018-04-15 RX ADMIN — INSULIN LISPRO 3 UNITS: 100 INJECTION, SOLUTION INTRAVENOUS; SUBCUTANEOUS at 06:48

## 2018-04-15 RX ADMIN — Medication 10 ML: at 21:37

## 2018-04-15 RX ADMIN — MORPHINE SULFATE 45 MG: 30 TABLET ORAL at 23:31

## 2018-04-15 RX ADMIN — Medication 10 ML: at 19:07

## 2018-04-15 RX ADMIN — MORPHINE SULFATE 45 MG: 30 TABLET ORAL at 18:22

## 2018-04-15 RX ADMIN — DRONABINOL 2.5 MG: 2.5 CAPSULE ORAL at 09:13

## 2018-04-15 RX ADMIN — PIPERACILLIN AND TAZOBACTAM 3.38 G: 3; .375 INJECTION, POWDER, FOR SOLUTION INTRAVENOUS at 13:33

## 2018-04-15 RX ADMIN — DRONABINOL 2.5 MG: 2.5 CAPSULE ORAL at 18:22

## 2018-04-15 RX ADMIN — MORPHINE SULFATE 100 MG: 100 TABLET, FILM COATED, EXTENDED RELEASE ORAL at 08:28

## 2018-04-15 RX ADMIN — PROCHLORPERAZINE EDISYLATE 5 MG: 5 INJECTION INTRAMUSCULAR; INTRAVENOUS at 15:15

## 2018-04-15 NOTE — PROGRESS NOTES
Patient's would vac not working. Leaking all over abdomen. Spoke with Dr. Kirstie Walton. He wanted to keep the sponge in place on patient. Wet to dry dressing was placed over the sponge.

## 2018-04-15 NOTE — PROGRESS NOTES
Bedside shift change report given to Mishel Diaz RN (oncoming nurse) by Donaldo Miller RN (offgoing nurse). Report included the following information SBAR, Kardex, Intake/Output, MAR and Recent Results.

## 2018-04-15 NOTE — PROGRESS NOTES
Progress Note    Patient: Pradeep Love MRN: [id number]  SSN: [id number]    YOB: 1975  Age: 38 y.o. Sex: female      Admit Date: 3/7/2018    3 Days Post-Op    Procedure:  Procedure(s):  OPEN JEJUNOSTOMY TUBE PLACEMENT UNDER FLURO/ WOUND VAC CHANGE. Subjective:     Patient vac no longer functional   She had new dressing placed but still noticing some leaking. She decreased her PO intake to decrease the leakage. Objective:     Visit Vitals    /65    Pulse (!) 107    Temp 98.2 °F (36.8 °C)    Resp 18    Ht 5' 4\" (1.626 m)    Wt 327 lb 8 oz (148.6 kg)    SpO2 100%    BMI 56.22 kg/m2       Temp (24hrs), Av.2 °F (36.8 °C), Min:98.1 °F (36.7 °C), Max:98.2 °F (36.8 °C)      Physical Exam:    Gen- Alert in NAD   Lungs-CTA  H-RRR  Abd-- soft - dressing intact    Data Review: images and reports reviewed    Lab Review: All lab results for the last 24 hours reviewed. Recent Results (from the past 24 hour(s))   GLUCOSE, POC    Collection Time: 18  5:05 PM   Result Value Ref Range    Glucose (POC) 93 65 - 100 mg/dL    Performed by Ady Redmond    METABOLIC PANEL, COMPREHENSIVE    Collection Time: 04/15/18  4:59 AM   Result Value Ref Range    Sodium 138 136 - 145 mmol/L    Potassium 4.4 3.5 - 5.1 mmol/L    Chloride 112 (H) 97 - 108 mmol/L    CO2 18 (L) 21 - 32 mmol/L    Anion gap 8 5 - 15 mmol/L    Glucose 125 (H) 65 - 100 mg/dL    BUN 32 (H) 6 - 20 MG/DL    Creatinine 0.73 0.55 - 1.02 MG/DL    BUN/Creatinine ratio 44 (H) 12 - 20      GFR est AA >60 >60 ml/min/1.73m2    GFR est non-AA >60 >60 ml/min/1.73m2    Calcium 8.7 8.5 - 10.1 MG/DL    Bilirubin, total 0.4 0.2 - 1.0 MG/DL    ALT (SGPT) 41 12 - 78 U/L    AST (SGOT) 52 (H) 15 - 37 U/L    Alk.  phosphatase 139 (H) 45 - 117 U/L    Protein, total 7.3 6.4 - 8.2 g/dL    Albumin 1.9 (L) 3.5 - 5.0 g/dL    Globulin 5.4 (H) 2.0 - 4.0 g/dL    A-G Ratio 0.4 (L) 1.1 - 2.2     MAGNESIUM    Collection Time: 04/15/18  4:59 AM   Result Value Ref Range    Magnesium 1.9 1.6 - 2.4 mg/dL   PHOSPHORUS    Collection Time: 04/15/18  4:59 AM   Result Value Ref Range    Phosphorus 4.0 2.6 - 4.7 MG/DL   GLUCOSE, POC    Collection Time: 04/15/18  6:37 AM   Result Value Ref Range    Glucose (POC) 158 (H) 65 - 100 mg/dL    Performed by Rustam Lynne    GLUCOSE, POC    Collection Time: 04/15/18 12:21 PM   Result Value Ref Range    Glucose (POC) 97 65 - 100 mg/dL    Performed by Elvia Pierce        Assessment:     Hospital Problems  Date Reviewed: 10/27/2017          Codes Class Noted POA    Small bowel fistula ICD-10-CM: K63.2  ICD-9-CM: 569.81  3/7/2018 Unknown              Plan/Recommendations/Medical Decision Making:   Keep current dressing. Will have wound care see tomorrow to reapply vac  Will restart tube feed at low dose.      Signed By: Jay Whitehead MD     April 15, 2018

## 2018-04-16 LAB
GLUCOSE BLD STRIP.AUTO-MCNC: 129 MG/DL (ref 65–100)
GLUCOSE BLD STRIP.AUTO-MCNC: 130 MG/DL (ref 65–100)
GLUCOSE BLD STRIP.AUTO-MCNC: 161 MG/DL (ref 65–100)
SERVICE CMNT-IMP: ABNORMAL

## 2018-04-16 PROCEDURE — 74011250637 HC RX REV CODE- 250/637: Performed by: PHYSICIAN ASSISTANT

## 2018-04-16 PROCEDURE — 3331090002 HH PPS REVENUE DEBIT

## 2018-04-16 PROCEDURE — 74011250637 HC RX REV CODE- 250/637: Performed by: NURSE PRACTITIONER

## 2018-04-16 PROCEDURE — 97116 GAIT TRAINING THERAPY: CPT

## 2018-04-16 PROCEDURE — 74011636637 HC RX REV CODE- 636/637: Performed by: NURSE PRACTITIONER

## 2018-04-16 PROCEDURE — 74011000258 HC RX REV CODE- 258: Performed by: SURGERY

## 2018-04-16 PROCEDURE — 74011250637 HC RX REV CODE- 250/637: Performed by: INTERNAL MEDICINE

## 2018-04-16 PROCEDURE — 74011250637 HC RX REV CODE- 250/637: Performed by: PHYSICAL MEDICINE & REHABILITATION

## 2018-04-16 PROCEDURE — 97530 THERAPEUTIC ACTIVITIES: CPT

## 2018-04-16 PROCEDURE — 3331090001 HH PPS REVENUE CREDIT

## 2018-04-16 PROCEDURE — 74011250636 HC RX REV CODE- 250/636: Performed by: NURSE PRACTITIONER

## 2018-04-16 PROCEDURE — 77030018798 HC PMP KT ENTRL FED COVD -A

## 2018-04-16 PROCEDURE — 74011250636 HC RX REV CODE- 250/636: Performed by: SURGERY

## 2018-04-16 PROCEDURE — 74011250636 HC RX REV CODE- 250/636: Performed by: PHYSICAL MEDICINE & REHABILITATION

## 2018-04-16 PROCEDURE — 82962 GLUCOSE BLOOD TEST: CPT

## 2018-04-16 PROCEDURE — 97606 NEG PRS WND THER DME>50 SQCM: CPT

## 2018-04-16 PROCEDURE — 65270000029 HC RM PRIVATE

## 2018-04-16 PROCEDURE — 77030032491 HC SLV COMPR SCD KNE XL COVD -B

## 2018-04-16 RX ADMIN — MORPHINE SULFATE 45 MG: 30 TABLET ORAL at 07:22

## 2018-04-16 RX ADMIN — Medication 10 ML: at 07:23

## 2018-04-16 RX ADMIN — Medication 10 ML: at 21:03

## 2018-04-16 RX ADMIN — ONDANSETRON HYDROCHLORIDE 4 MG: 2 INJECTION INTRAMUSCULAR; INTRAVENOUS at 15:30

## 2018-04-16 RX ADMIN — MORPHINE SULFATE 45 MG: 30 TABLET ORAL at 13:22

## 2018-04-16 RX ADMIN — ASPIRIN 325 MG: 325 TABLET ORAL at 10:03

## 2018-04-16 RX ADMIN — MORPHINE SULFATE 5 MG: 4 INJECTION, SOLUTION INTRAMUSCULAR; INTRAVENOUS at 13:25

## 2018-04-16 RX ADMIN — MORPHINE SULFATE 45 MG: 30 TABLET ORAL at 20:30

## 2018-04-16 RX ADMIN — DRONABINOL 2.5 MG: 2.5 CAPSULE ORAL at 09:52

## 2018-04-16 RX ADMIN — Medication 10 ML: at 15:30

## 2018-04-16 RX ADMIN — MORPHINE SULFATE 45 MG: 30 TABLET ORAL at 03:42

## 2018-04-16 RX ADMIN — PIPERACILLIN AND TAZOBACTAM 3.38 G: 3; .375 INJECTION, POWDER, FOR SOLUTION INTRAVENOUS at 07:22

## 2018-04-16 RX ADMIN — MORPHINE SULFATE 5 MG: 4 INJECTION, SOLUTION INTRAMUSCULAR; INTRAVENOUS at 09:53

## 2018-04-16 RX ADMIN — MORPHINE SULFATE 5 MG: 4 INJECTION, SOLUTION INTRAMUSCULAR; INTRAVENOUS at 01:24

## 2018-04-16 RX ADMIN — INSULIN LISPRO 3 UNITS: 100 INJECTION, SOLUTION INTRAVENOUS; SUBCUTANEOUS at 07:24

## 2018-04-16 RX ADMIN — Medication 10 ML: at 09:53

## 2018-04-16 RX ADMIN — MORPHINE SULFATE 5 MG: 4 INJECTION, SOLUTION INTRAMUSCULAR; INTRAVENOUS at 20:58

## 2018-04-16 RX ADMIN — MORPHINE SULFATE 100 MG: 100 TABLET, FILM COATED, EXTENDED RELEASE ORAL at 15:30

## 2018-04-16 RX ADMIN — ONDANSETRON HYDROCHLORIDE 4 MG: 2 INJECTION INTRAMUSCULAR; INTRAVENOUS at 21:40

## 2018-04-16 RX ADMIN — ENOXAPARIN SODIUM 40 MG: 100 INJECTION SUBCUTANEOUS at 10:00

## 2018-04-16 RX ADMIN — MORPHINE SULFATE 115 MG: 15 TABLET, EXTENDED RELEASE ORAL at 23:14

## 2018-04-16 RX ADMIN — MORPHINE SULFATE 45 MG: 30 TABLET ORAL at 09:52

## 2018-04-16 RX ADMIN — Medication 10 ML: at 15:31

## 2018-04-16 RX ADMIN — Medication 10 ML: at 09:54

## 2018-04-16 RX ADMIN — DRONABINOL 2.5 MG: 2.5 CAPSULE ORAL at 18:51

## 2018-04-16 RX ADMIN — MORPHINE SULFATE 5 MG: 4 INJECTION, SOLUTION INTRAMUSCULAR; INTRAVENOUS at 17:02

## 2018-04-16 RX ADMIN — MORPHINE SULFATE 45 MG: 30 TABLET ORAL at 15:29

## 2018-04-16 RX ADMIN — MORPHINE SULFATE 45 MG: 30 TABLET ORAL at 23:14

## 2018-04-16 RX ADMIN — PROCHLORPERAZINE EDISYLATE 5 MG: 5 INJECTION INTRAMUSCULAR; INTRAVENOUS at 13:22

## 2018-04-16 RX ADMIN — ONDANSETRON HYDROCHLORIDE 4 MG: 2 INJECTION INTRAMUSCULAR; INTRAVENOUS at 09:58

## 2018-04-16 RX ADMIN — MORPHINE SULFATE 45 MG: 30 TABLET ORAL at 18:52

## 2018-04-16 RX ADMIN — MORPHINE SULFATE 100 MG: 100 TABLET, FILM COATED, EXTENDED RELEASE ORAL at 07:23

## 2018-04-16 NOTE — PROGRESS NOTES
General Surgery Daily Progress Note    Admit Date: 3/7/2018  Post-Operative Day: 4 Days Post-Op from Procedure(s):  OPEN JEJUNOSTOMY TUBE PLACEMENT UNDER FLURO/ WOUND VAC CHANGE. Subjective:     Last 24 hrs: Observed wound this am - wound vac stopped working over the weekend. Leaking around sponge and appliance was an issue. Wound covered w/ lg amts drsg to temporize situation until vac reapplied. TF off. WBC trending down. Objective:     Blood pressure 113/70, pulse (!) 111, temperature 98.4 °F (36.9 °C), resp. rate 20, height 5' 4\" (1.626 m), weight 345 lb 3.2 oz (156.6 kg), SpO2 99 %. Temp (24hrs), Av.5 °F (36.9 °C), Min:98.4 °F (36.9 °C), Max:98.6 °F (37 °C)      _____________________  Physical Exam:     Alert and Oriented, x3, in no acute distress. Cardiovascular: tachy, no peripheral edema  Abdomen: lg open wound to mid-abd.; edges granulating; pink tissue in wound bed w/ enteric contents; lavaged w/ saline and sxn'd. Stoma pink w/ red-rubber beside; skin surrounding wound pink and macerated. 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         ______________________  Medications:    Current Facility-Administered Medications   Medication Dose Route Frequency    piperacillin-tazobactam (ZOSYN) 3.375 g in 0.9% sodium chloride (MBP/ADV) 100 mL  3.375 g IntraVENous Q8H    0.9% sodium chloride infusion 250 mL  250 mL IntraVENous PRN    morphine injection 5 mg  5 mg IntraVENous DAILY    insulin lispro (HUMALOG) injection   SubCUTAneous BID    morphine IR (MS IR) tablet 45 mg  45 mg Oral Q3H    morphine injection 5 mg  5 mg IntraVENous Q2H PRN    morphine CR (MS CONTIN) tablet 115 mg  115 mg Oral QHS    morphine CR (MS CONTIN) tablet 100 mg  100 mg Oral Q24H    morphine CR (MS CONTIN) tablet 100 mg  100 mg Oral Q24H    aspirin (ASPIRIN) tablet 325 mg  325 mg Oral DAILY    0.9% sodium chloride infusion 250 mL  250 mL IntraVENous PRN    acetaminophen (TYLENOL) tablet 650 mg  650 mg Oral Q6H PRN    diphenhydrAMINE (BENADRYL) capsule 25 mg  25 mg Oral Q6H PRN    enoxaparin (LOVENOX) injection 40 mg  40 mg SubCUTAneous Q24H    fat emulsion 20% (LIPOSYN, INTRAlipid) infusion 250 mL  250 mL IntraVENous Q MON, WED & FRI    nystatin (MYCOSTATIN) 100,000 unit/gram cream   Topical BID    prochlorperazine (COMPAZINE) with saline injection 5 mg  5 mg IntraVENous Q6H PRN    naloxone (NARCAN) injection 0.4 mg  0.4 mg IntraVENous EVERY 2 MINUTES AS NEEDED    glucose chewable tablet 16 g  4 Tab Oral PRN    dextrose (D50W) injection syrg 12.5-25 g  12.5-25 g IntraVENous PRN    glucagon (GLUCAGEN) injection 1 mg  1 mg IntraMUSCular PRN    scopolamine (TRANSDERM-SCOP) 1 mg over 3 days 1 Patch  1 Patch TransDERmal Q72H    sodium chloride (NS) flush 10 mL  10 mL InterCATHeter Q24H    sodium chloride (NS) flush 10 mL  10 mL InterCATHeter Q8H    alteplase (CATHFLO) 1 mg in sterile water (preservative free) 1 mL injection  1 mg InterCATHeter PRN    bacitracin 500 unit/gram packet 1 Packet  1 Packet Topical PRN    sodium chloride (NS) flush 20 mL  20 mL InterCATHeter PRN    sodium chloride (NS) flush 10 mL  10 mL InterCATHeter Q24H    sodium chloride (NS) flush 10 mL  10 mL InterCATHeter PRN    sodium chloride (NS) flush 10 mL  10 mL InterCATHeter Q8H    dronabinol (MARINOL) capsule 2.5 mg  2.5 mg Oral BID    phenol throat spray (CHLORASEPTIC) 1 Spray  1 Spray Oral PRN    ondansetron (ZOFRAN) injection 4 mg  4 mg IntraVENous Q4H PRN    promethazine (PHENERGAN) 12.5 mg in 0.9% sodium chloride 50 mL IVPB  12.5 mg IntraVENous Q6H PRN    LORazepam (ATIVAN) injection 1 mg  1 mg IntraVENous Q6H PRN    albuterol (PROVENTIL VENTOLIN) nebulizer solution 2.5 mg  2.5 mg Nebulization Q4H PRN       Kristofer Chicas NP  4/16/2018

## 2018-04-16 NOTE — DIABETES MGMT
DTC Consult Note    Recommendations/ Comments: Consult noted for hospital glucose management. Blood sugars currently in target and it appears that patient is not longer receiving TPN, but is getting tube feedings. No recommendations at this time. Current hospital DM medication:  Lispro resistant correction scale    DTC to follow. Chart reviewed on [address] Patient is a 38 y.o. female with known diabetes on Humalog correction scale at home. A1c:   No results found for: HBA1C, HGBE8, SQE3EIPA, JNV2DZIC    Recent Glucose Results:   Lab Results   Component Value Date/Time    GLUCPOC 130 (H) 04/16/2018 12:25 PM    GLUCPOC 161 (H) 04/16/2018 06:54 AM        Lab Results   Component Value Date/Time    Creatinine 0.73 04/15/2018 04:59 AM     Estimated Creatinine Clearance: 154.4 mL/min (based on Cr of 0.73). Active Orders   Diet    DIET MECHANICAL SOFT        PO intake:   Patient Vitals for the past 72 hrs:   % Diet Eaten   04/13/18 1735 50 %       Will continue to follow as needed.     Thank you  Verenice Marcum, MS, RN, CDE

## 2018-04-16 NOTE — PROGRESS NOTES
Bedside and Verbal shift change report given to Barnet Heimlich, RN (oncoming nurse) by Sariah Simmons RN (offgoing nurse). Report included the following information SBAR, Kardex, ED Summary, Intake/Output, MAR and Recent Results.

## 2018-04-16 NOTE — PROGRESS NOTES
As per NP note. Ms. Ana Gonzales is up ambulating in dick. She reports no significant increase in fistula output with meals. Tm 98.6 HR: 111 BP: 113/70 Resp Rate: 20 99% sat on room air. Abdomen - Fistula well controlled. Wound vac in place. Diet and supplements as tolerated. Advance tube feeds as tolerated. Stop TPN. Will stop IV abx. PT following. DVT Prophylaxis - Lovenox. DTC to see. Palliative Care following.

## 2018-04-16 NOTE — WOUND CARE
WOCN Note:     Follow-up visit for [facility] dressing placement. Chart shows:  Admitted for fistula takdown 3/7/18 by Dr. Davalos Comes with [facility] placement in [address] [place] [address]; history of multiple abdominal surgeries and Crohns disease. Admitted from home. Mother able to provide assistance in pouching prior to admission and became very competent in doing so.       Assessment:   Patient is A&O x 4, continent and mobile - moves around room with assistance.     Bed: total care bariatric  Pre-medicated by RN.       1. Midline abdominal surgical wound see previous note for measurements. 95% pink granular wound base and 5% stomatized mucus membrane centrally with peristalsis. Periwound with some irritation from effluent - [place] applied. Red rubber cath in stoma for feedings. 1 piece of black sponge with central hole to allow for pouching of the fistulas; 2 Hawk's rings placed around hole top and bottom to achieve seal, paste added.  After seal achieved, a convex pouch was placed to collect fistula output. 50 mmHg continuous suction.       Goal is to find a system for use at home until wound is small enough to pouch (we are getting close to a pouchable size).     Colostomy: stoma is red & moist; small amount of mucoid tan output; peristomal skin intact with mucosal transplantation noted; 2-piece flat pouch changed today.      Abdominal folds MASD resolving nicely with stoma powder used as needed.       Wound Recommendations:    Continue NPWT as ordered @ 50 mmHg using black sponge and change Monday, Wednesday, & Friday.     Skin Care & Pressure Relief Recommendations:  Minimize layers of linen/pads under patient to optimize support surface. Turn/reposition approximately every 2 hours and offload heels. Promote continence    Discussed above plan with patient & RN, Nawaf Gamboa.     Transition of Care: Plan to follow as needed while admitted to hospital.     YOLANDA LemonsN, RN, [facility]  Certified Wound, Ostomy, Continence Nurse  office [phone]  pager [phone] or call  to page

## 2018-04-16 NOTE — PROGRESS NOTES
NUTRITION       Recommendations:  1. Consider adding IVF to help make up for TPN being held as this typically provides 2400 mL/day (2650 mL on M/W/F), and tube feedings will be providing negligible amounts for the next 24 hours with flushes of only 50 mL q6 hours (it will take 48 hours to reach her goal rate). 2. Once tolerating goal, add liquid Prosource BID to meet 100% of estimated protein needs (2280 kcal, 128 g protein)    3. Document all tube feeding and flush volumes  -- Document all formula volume totals under Feeding Tube: \"Intake\"  -- Document all water flush volume totals under Feeding Tube: \"Water Flush Volume\"  -- Document rate of tube feeding under Feeding Tube: \"Tube Feeding/Verify Rate\"    4. Document all ileostomy output in I/O's    Brief note. Chart reviewed, discussed with RN and team during interdisciplinary rounds. Pt's tube feedings were stopped on 4/14 secondary to leakage around her fistula and issues with her wound vac. Pt made herself NPO to help decrease fistula output over the weekend. She remains on TPN, but noted plans to hold for tonight and see how she does with tube feedings. Lipids are due for tonight-- consider holding these as well. Tube feeding initiated during visit (~1600). No need to check residuals for J-tube. Plan is to progress slowly (q12 hours) to goal and evaluate tolerance and ostomy output. Tube Feeding: Peptamen 1.5 @ 20 mL/hr and advance 10 mL/hr q12 hours to goal of 60 mL/hr + 50 mL flush q 6 hours  -- This goal will provide 2160 kcal, 98 g protein and 1310 mL total free water (tf + flush)-- meeting 100% and 82% of estimated kcal and protein needs, respectively. Estimated Nutrition Needs:   Kcals/day: 1649 Kcals/day (1354-7897 kcal/day (11-15 kcal/kg))  Protein: 120 g (120-150 kcal/day (0.8-1 g/kg))  Fluid: 2250 ml (or 1 mL/kcal or per output)     Based On: Kcal/kg - specify (Comment)  Weight Used: Actual wt (149.9 kg)    RD to follow.     Madisyn Lanza Viviana Kline, [address] [facility]

## 2018-04-16 NOTE — PROGRESS NOTES
Problem: Mobility Impaired (Adult and Pediatric)  Goal: *Acute Goals and Plan of Care (Insert Text)  Physical Therapy Goals  Update 4/6/2018  1. Patient will move from supine to sit and sit to supine  in bed with moderate assistance  within 7 day(s). 2.  Patient will transfer from bed to chair and chair to bed with moderate assistance  using the least restrictive device within 7 day(s). 3.  Patient will perform sit to stand with CGA within 7 days  4. Patient will ambulate 22' with moderate assist x 1 using LRAD within 7 days. Revised 3/27/2018  1. Patient will move from supine to sit and sit to supine  in bed with moderate assistance  within 7 day(s). 2.  Patient will transfer from bed to chair and chair to bed with moderate assistance  using the least restrictive device within 7 day(s). 3.  Patient will perform sit to stand with moderate assistance  within 7 day(s). 4.  Patient will ambulate 100' with moderate assist x1 using the least restrictive device within 7 day(s). Revisited 3/19/2018, goals remain appropriate, carry over    Physical Therapy Goals  Initiated 3/8/2018  1. Patient will move from supine to sit and sit to supine  in bed with moderate assistance  within 7 day(s). 2.  Patient will transfer from bed to chair and chair to bed with moderate assistance  using the least restrictive device within 7 day(s). 3.  Patient will perform sit to stand with moderate assistance  within 7 day(s). 4.  PT will assess gait with the least restrictive device within 7 day(s). physical Therapy TREATMENT  Patient: Mesha Javed (38 y.o. female)  Date: 4/16/2018  Diagnosis: FISTULA  Small bowel fistula  INTEROCUTANOUS FISTULA <principal problem not specified>  Procedure(s) (LRB):  OPEN JEJUNOSTOMY TUBE PLACEMENT UNDER FLURO/ WOUND VAC CHANGE. (N/A) 4 Days Post-Op  Precautions: Fall, Skin  Chart, physical therapy assessment, plan of care and goals were reviewed.     ASSESSMENT:  Pt was agreeable to therapy. Pt reports that she has been up in the chair and performing exercises over the weekend. Pt is improving confidence with standing and ambulating. Pt has a goal of being able to due steps for access. Pt is utilizing bed in chair position for sit to stand. Will need to progress to supine to sit. Pt may benefit from Inpt rehab to continue progression and optimize mobility. Progression toward goals:  [x]    Improving appropriately and progressing toward goals  []    Improving slowly and progressing toward goals  []    Not making progress toward goals and plan of care will be adjusted     PLAN:  Patient continues to benefit from skilled intervention to address the above impairments. Continue treatment per established plan of care. Discharge Recommendations:  Inpatient Rehab  Further Equipment Recommendations for Discharge:  TBD     SUBJECTIVE:   Patient stated Nkechi Toth got up over the weekend.     OBJECTIVE DATA SUMMARY:   Critical Behavior:  Neurologic State: Alert  Orientation Level: Oriented X4  Cognition: Follows commands  Safety/Judgement: Good awareness of safety precautions  Functional Mobility Training:  Bed Mobility:      utilized bed in chair position               Transfers:  Sit to Stand: Contact guard assistance  Stand to Sit: Contact guard assistance                             Balance:  Sitting: Intact  Standing: Impaired  Standing - Static: Good  Standing - Dynamic : Fair  Ambulation/Gait Training:  Distance (ft): 100 Feet (ft)  Assistive Device: Walker, rolling  Ambulation - Level of Assistance: Stand-by assistance;Contact guard assistance        Gait Abnormalities: Decreased step clearance        Base of Support: Widened     Speed/Liz: Slow  Step Length: Left shortened;Right shortened                   Stairs:                 Therapeutic Exercises:   Pain:  Pain Scale 1: Numeric (0 - 10)  Pain Intensity 1: 8  Pain Location 1: Abdomen  Pain Orientation 1: Anterior  Pain Description 1: Aching; Sharp  Pain Intervention(s) 1: Medication (see MAR)  Activity Tolerance:   Limited   Please refer to the flowsheet for vital signs taken during this treatment.   After treatment:   [x]    Patient left in no apparent distress sitting up in chair  []    Patient left in no apparent distress in bed  [x]    Call bell left within reach  [x]    Nursing notified  []    Caregiver present  []    Bed alarm activated    COMMUNICATION/COLLABORATION:   The patients plan of care was discussed with: Registered Nurse    Veneda Felty, PTA   Time Calculation: 29 mins

## 2018-04-17 VITALS
HEIGHT: 64 IN | HEART RATE: 110 BPM | TEMPERATURE: 97.5 F | SYSTOLIC BLOOD PRESSURE: 114 MMHG | WEIGHT: 293 LBS | BODY MASS INDEX: 50.02 KG/M2 | DIASTOLIC BLOOD PRESSURE: 72 MMHG | OXYGEN SATURATION: 99 % | RESPIRATION RATE: 20 BRPM

## 2018-04-17 LAB
ANION GAP SERPL CALC-SCNC: 8 MMOL/L (ref 5–15)
BUN SERPL-MCNC: 22 MG/DL (ref 6–20)
BUN/CREAT SERPL: 30 (ref 12–20)
CALCIUM SERPL-MCNC: 9.1 MG/DL (ref 8.5–10.1)
CHLORIDE SERPL-SCNC: 108 MMOL/L (ref 97–108)
CO2 SERPL-SCNC: 20 MMOL/L (ref 21–32)
CREAT SERPL-MCNC: 0.74 MG/DL (ref 0.55–1.02)
ERYTHROCYTE [DISTWIDTH] IN BLOOD BY AUTOMATED COUNT: 19.2 % (ref 11.5–14.5)
GLUCOSE BLD STRIP.AUTO-MCNC: 105 MG/DL (ref 65–100)
GLUCOSE BLD STRIP.AUTO-MCNC: 109 MG/DL (ref 65–100)
GLUCOSE SERPL-MCNC: 115 MG/DL (ref 65–100)
HCT VFR BLD AUTO: 26.6 % (ref 35–47)
HGB BLD-MCNC: 8.2 G/DL (ref 11.5–16)
MAGNESIUM SERPL-MCNC: 1.6 MG/DL (ref 1.6–2.4)
MCH RBC QN AUTO: 30 PG (ref 26–34)
MCHC RBC AUTO-ENTMCNC: 30.8 G/DL (ref 30–36.5)
MCV RBC AUTO: 97.4 FL (ref 80–99)
NRBC # BLD: 0 K/UL (ref 0–0.01)
NRBC BLD-RTO: 0 PER 100 WBC
PHOSPHATE SERPL-MCNC: 4.4 MG/DL (ref 2.6–4.7)
PLATELET # BLD AUTO: 408 K/UL (ref 150–400)
PMV BLD AUTO: 9 FL (ref 8.9–12.9)
POTASSIUM SERPL-SCNC: 4.1 MMOL/L (ref 3.5–5.1)
RBC # BLD AUTO: 2.73 M/UL (ref 3.8–5.2)
SERVICE CMNT-IMP: ABNORMAL
SERVICE CMNT-IMP: ABNORMAL
SODIUM SERPL-SCNC: 136 MMOL/L (ref 136–145)
WBC # BLD AUTO: 15.6 K/UL (ref 3.6–11)

## 2018-04-17 PROCEDURE — 77030018798 HC PMP KT ENTRL FED COVD -A

## 2018-04-17 PROCEDURE — 65270000029 HC RM PRIVATE

## 2018-04-17 PROCEDURE — 77030019952 HC CANSTR VAC ASST KCON -B

## 2018-04-17 PROCEDURE — 3331090002 HH PPS REVENUE DEBIT

## 2018-04-17 PROCEDURE — 74011250636 HC RX REV CODE- 250/636: Performed by: PHYSICAL MEDICINE & REHABILITATION

## 2018-04-17 PROCEDURE — 74011250637 HC RX REV CODE- 250/637: Performed by: PHYSICAL MEDICINE & REHABILITATION

## 2018-04-17 PROCEDURE — 74011250636 HC RX REV CODE- 250/636: Performed by: NURSE PRACTITIONER

## 2018-04-17 PROCEDURE — 82962 GLUCOSE BLOOD TEST: CPT

## 2018-04-17 PROCEDURE — 74011250637 HC RX REV CODE- 250/637: Performed by: INTERNAL MEDICINE

## 2018-04-17 PROCEDURE — 74011250637 HC RX REV CODE- 250/637: Performed by: NURSE PRACTITIONER

## 2018-04-17 PROCEDURE — 74011250636 HC RX REV CODE- 250/636: Performed by: SURGERY

## 2018-04-17 PROCEDURE — 3331090001 HH PPS REVENUE CREDIT

## 2018-04-17 PROCEDURE — 83735 ASSAY OF MAGNESIUM: CPT | Performed by: SURGERY

## 2018-04-17 PROCEDURE — 85027 COMPLETE CBC AUTOMATED: CPT | Performed by: SURGERY

## 2018-04-17 PROCEDURE — 74011250637 HC RX REV CODE- 250/637: Performed by: PHYSICIAN ASSISTANT

## 2018-04-17 PROCEDURE — 80048 BASIC METABOLIC PNL TOTAL CA: CPT | Performed by: SURGERY

## 2018-04-17 PROCEDURE — 36415 COLL VENOUS BLD VENIPUNCTURE: CPT | Performed by: SURGERY

## 2018-04-17 PROCEDURE — 84100 ASSAY OF PHOSPHORUS: CPT | Performed by: SURGERY

## 2018-04-17 RX ORDER — MORPHINE SULFATE 2 MG/ML
5 INJECTION, SOLUTION INTRAMUSCULAR; INTRAVENOUS EVERY 24 HOURS
Status: DISCONTINUED | OUTPATIENT
Start: 2018-04-17 | End: 2018-04-17 | Stop reason: SDUPTHER

## 2018-04-17 RX ORDER — MORPHINE SULFATE 2 MG/ML
5 INJECTION, SOLUTION INTRAMUSCULAR; INTRAVENOUS
Status: DISCONTINUED | OUTPATIENT
Start: 2018-04-17 | End: 2018-04-17 | Stop reason: SDUPTHER

## 2018-04-17 RX ORDER — MORPHINE SULFATE 10 MG/ML
5 INJECTION, SOLUTION INTRAMUSCULAR; INTRAVENOUS
Status: DISCONTINUED | OUTPATIENT
Start: 2018-04-17 | End: 2018-04-19 | Stop reason: CLARIF

## 2018-04-17 RX ORDER — SODIUM CHLORIDE 9 MG/ML
100 INJECTION, SOLUTION INTRAVENOUS CONTINUOUS
Status: DISCONTINUED | OUTPATIENT
Start: 2018-04-17 | End: 2018-04-20

## 2018-04-17 RX ORDER — MORPHINE SULFATE 10 MG/ML
5 INJECTION, SOLUTION INTRAMUSCULAR; INTRAVENOUS EVERY 24 HOURS
Status: DISCONTINUED | OUTPATIENT
Start: 2018-04-17 | End: 2018-04-24

## 2018-04-17 RX ADMIN — MORPHINE SULFATE 45 MG: 30 TABLET ORAL at 05:10

## 2018-04-17 RX ADMIN — PROCHLORPERAZINE EDISYLATE 5 MG: 5 INJECTION INTRAMUSCULAR; INTRAVENOUS at 22:58

## 2018-04-17 RX ADMIN — MORPHINE SULFATE 100 MG: 100 TABLET, FILM COATED, EXTENDED RELEASE ORAL at 07:39

## 2018-04-17 RX ADMIN — Medication 10 ML: at 12:05

## 2018-04-17 RX ADMIN — MORPHINE SULFATE 45 MG: 30 TABLET ORAL at 14:53

## 2018-04-17 RX ADMIN — Medication 10 ML: at 14:57

## 2018-04-17 RX ADMIN — ONDANSETRON HYDROCHLORIDE 4 MG: 2 INJECTION INTRAMUSCULAR; INTRAVENOUS at 14:53

## 2018-04-17 RX ADMIN — PROCHLORPERAZINE EDISYLATE 5 MG: 5 INJECTION INTRAMUSCULAR; INTRAVENOUS at 16:47

## 2018-04-17 RX ADMIN — MORPHINE SULFATE 100 MG: 100 TABLET, FILM COATED, EXTENDED RELEASE ORAL at 16:49

## 2018-04-17 RX ADMIN — MORPHINE SULFATE 45 MG: 30 TABLET ORAL at 02:30

## 2018-04-17 RX ADMIN — DRONABINOL 2.5 MG: 2.5 CAPSULE ORAL at 19:15

## 2018-04-17 RX ADMIN — Medication 10 ML: at 21:14

## 2018-04-17 RX ADMIN — ONDANSETRON HYDROCHLORIDE 4 MG: 2 INJECTION INTRAMUSCULAR; INTRAVENOUS at 05:10

## 2018-04-17 RX ADMIN — Medication 10 ML: at 23:01

## 2018-04-17 RX ADMIN — MORPHINE SULFATE 45 MG: 30 TABLET ORAL at 11:04

## 2018-04-17 RX ADMIN — Medication 10 ML: at 21:13

## 2018-04-17 RX ADMIN — ONDANSETRON HYDROCHLORIDE 4 MG: 2 INJECTION INTRAMUSCULAR; INTRAVENOUS at 21:05

## 2018-04-17 RX ADMIN — MORPHINE SULFATE 5 MG: 10 INJECTION, SOLUTION INTRAMUSCULAR; INTRAVENOUS at 19:14

## 2018-04-17 RX ADMIN — ASPIRIN 325 MG: 325 TABLET ORAL at 08:29

## 2018-04-17 RX ADMIN — MORPHINE SULFATE 115 MG: 15 TABLET, EXTENDED RELEASE ORAL at 22:58

## 2018-04-17 RX ADMIN — SODIUM CHLORIDE 100 ML/HR: 900 INJECTION, SOLUTION INTRAVENOUS at 12:05

## 2018-04-17 RX ADMIN — Medication 10 ML: at 05:10

## 2018-04-17 RX ADMIN — MORPHINE SULFATE 5 MG: 10 INJECTION, SOLUTION INTRAMUSCULAR; INTRAVENOUS at 23:36

## 2018-04-17 RX ADMIN — SODIUM CHLORIDE 100 ML/HR: 900 INJECTION, SOLUTION INTRAVENOUS at 21:13

## 2018-04-17 RX ADMIN — MORPHINE SULFATE 5 MG: 4 INJECTION, SOLUTION INTRAMUSCULAR; INTRAVENOUS at 01:59

## 2018-04-17 RX ADMIN — MORPHINE SULFATE 45 MG: 30 TABLET ORAL at 21:08

## 2018-04-17 RX ADMIN — DRONABINOL 2.5 MG: 2.5 CAPSULE ORAL at 08:29

## 2018-04-17 RX ADMIN — ENOXAPARIN SODIUM 40 MG: 100 INJECTION SUBCUTANEOUS at 11:04

## 2018-04-17 RX ADMIN — MORPHINE SULFATE 5 MG: 10 INJECTION, SOLUTION INTRAMUSCULAR; INTRAVENOUS at 12:04

## 2018-04-17 RX ADMIN — MORPHINE SULFATE 45 MG: 30 TABLET ORAL at 08:30

## 2018-04-17 NOTE — DIABETES MGMT
DTC Progress Note    Recommendations/ Comments: Pt is on TF. Blood sugars are stable. No new recommendations. DTC to follow. Chart reviewed on [address] Patient is a 38 y.o. female with known diabetes on Humalog correction scale at home. A1c:   No results found for: HBA1C, HGBE8, IMX2OEZE, OQV2VRRS    Recent Glucose Results:   Lab Results   Component Value Date/Time     (H) 04/17/2018 05:15 AM    GLUCPOC 109 (H) 04/17/2018 07:35 AM    GLUCPOC 129 (H) 04/16/2018 06:50 PM    GLUCPOC 130 (H) 04/16/2018 12:25 PM        Lab Results   Component Value Date/Time    Creatinine 0.74 04/17/2018 05:15 AM     Estimated Creatinine Clearance: 151.1 mL/min (based on Cr of 0.74). Active Orders   Diet    DIET MECHANICAL SOFT        PO intake:   No data found. Will continue to follow as needed.     Thank you  Mushtaq Hopkins RD, CDE

## 2018-04-17 NOTE — PROGRESS NOTES
Physical Therapy  Attempted to see pt for skilled PT services this afternoon. Pt deferred x2 2* nausea despite Zofran use. Pt states she will \"do double tomorrow. \"  Bariatric RW in room and will try again for gait training as appropriate tomorrow.   Thank you,  Sunny Woodruff, PT, DPT

## 2018-04-17 NOTE — PROGRESS NOTES
General Surgery Daily Progress Note    Admit Date: 3/7/2018  Post-Operative Day: 5 Days Post-Op from Procedure(s):  OPEN JEJUNOSTOMY TUBE PLACEMENT UNDER FLURO/ WOUND VAC CHANGE. Subjective:     Last 24 hrs: Pt is doing well w/o much pain. Wound vac working well. Ileostomy productive. TF at 20cc/hr, TPN off; tolerating mech soft diet    Objective:     Blood pressure 96/60, pulse (!) 112, temperature 98.5 °F (36.9 °C), resp. rate 18, height 5' 4\" (1.626 m), weight 341 lb 3.2 oz (154.8 kg), SpO2 100 %. Temp (24hrs), Av.5 °F (36.9 °C), Min:98.3 °F (36.8 °C), Max:98.6 °F (37 °C)      _____________________  Physical Exam:     Alert and Oriented, x3, in no acute distress. 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         ______________________  Medications:    Current Facility-Administered Medications   Medication Dose Route Frequency    0.9% sodium chloride infusion 250 mL  250 mL IntraVENous PRN    morphine injection 5 mg  5 mg IntraVENous DAILY    insulin lispro (HUMALOG) injection   SubCUTAneous BID    morphine IR (MS IR) tablet 45 mg  45 mg Oral Q3H    morphine injection 5 mg  5 mg IntraVENous Q2H PRN    morphine CR (MS CONTIN) tablet 115 mg  115 mg Oral QHS    morphine CR (MS CONTIN) tablet 100 mg  100 mg Oral Q24H    morphine CR (MS CONTIN) tablet 100 mg  100 mg Oral Q24H    aspirin (ASPIRIN) tablet 325 mg  325 mg Oral DAILY    0.9% sodium chloride infusion 250 mL  250 mL IntraVENous PRN    acetaminophen (TYLENOL) tablet 650 mg  650 mg Oral Q6H PRN    diphenhydrAMINE (BENADRYL) capsule 25 mg  25 mg Oral Q6H PRN    enoxaparin (LOVENOX) injection 40 mg  40 mg SubCUTAneous Q24H    fat emulsion 20% (LIPOSYN, INTRAlipid) infusion 250 mL  250 mL IntraVENous Q MON, WED & FRI    nystatin (MYCOSTATIN) 100,000 unit/gram cream   Topical BID    prochlorperazine (COMPAZINE) with saline injection 5 mg  5 mg IntraVENous Q6H PRN    naloxone (NARCAN) injection 0.4 mg  0.4 mg IntraVENous EVERY 2 MINUTES AS NEEDED    glucose chewable tablet 16 g  4 Tab Oral PRN    dextrose (D50W) injection syrg 12.5-25 g  12.5-25 g IntraVENous PRN    glucagon (GLUCAGEN) injection 1 mg  1 mg IntraMUSCular PRN    scopolamine (TRANSDERM-SCOP) 1 mg over 3 days 1 Patch  1 Patch TransDERmal Q72H    sodium chloride (NS) flush 10 mL  10 mL InterCATHeter Q24H    sodium chloride (NS) flush 10 mL  10 mL InterCATHeter Q8H    alteplase (CATHFLO) 1 mg in sterile water (preservative free) 1 mL injection  1 mg InterCATHeter PRN    bacitracin 500 unit/gram packet 1 Packet  1 Packet Topical PRN    sodium chloride (NS) flush 20 mL  20 mL InterCATHeter PRN    sodium chloride (NS) flush 10 mL  10 mL InterCATHeter Q24H    sodium chloride (NS) flush 10 mL  10 mL InterCATHeter PRN    sodium chloride (NS) flush 10 mL  10 mL InterCATHeter Q8H    dronabinol (MARINOL) capsule 2.5 mg  2.5 mg Oral BID    phenol throat spray (CHLORASEPTIC) 1 Spray  1 Spray Oral PRN    ondansetron (ZOFRAN) injection 4 mg  4 mg IntraVENous Q4H PRN    promethazine (PHENERGAN) 12.5 mg in 0.9% sodium chloride 50 mL IVPB  12.5 mg IntraVENous Q6H PRN  LORazepam (ATIVAN) injection 1 mg  1 mg IntraVENous Q6H PRN    albuterol (PROVENTIL VENTOLIN) nebulizer solution 2.5 mg  2.5 mg Nebulization Q4H PRN       Indra Alvarado, NP  4/17/2018      Pt seen and examined   Agree with  Above  She had some pressure with tube feeds but this has now resolved. Wound changed by Ostomy nurse earlier. Continue to advance tube feeds.

## 2018-04-17 NOTE — PROGRESS NOTES
Bedside shift change report given to Kalina Stout (oncoming nurse) by Jasbir (offgoing nurse). Report included the following information SBAR and Intake/Output.

## 2018-04-17 NOTE — WOUND CARE
Paged by Danisha Boston, RN, for leak around feeding tube exit site thru ostomy bag. Placed new cath secure as it comes thru the pouch - asked Danisha Boston to paged me back if this new system is problematic. VAC with a good seal and pouch catching the bilious proximal effluent. Will plan to do a full dressing change tomorrow.   BRYAN BaptisteN

## 2018-04-17 NOTE — PROGRESS NOTES
Follow up visit attempted with Ms. Palominophilippe. Pt appeared to be resting comfortably an did not respond when I knocked and entered her room. Will attempt to follow-up as able.     Bhumika Machado, Palliative

## 2018-04-17 NOTE — PROGRESS NOTES
Occupational Therapy Note [id number]    Chart reviewed. Attempted to work with patient, RN reporting she was experiencing significant nausea and was administering Zofran. Discussed goals for patient to complete simple grooming tasks from full bed/chair position with feet flat on floor. Will follow up tomorrow. Thank you.     Wally Brady, OT

## 2018-04-17 NOTE — PROGRESS NOTES
NUTRITION       Brief note. Chart reviewed, discussed with RN. Pt tolerating tube feeding at 20 mL/hr and ileostomy has been putting out (175 mL recorded so far today). Rate was increased to 20 mL/hr ~0500 this morning, so will increase again to 30 mL/hr ~1700 tonight. Continue to progression towards goal as tolerated. Agree with adding IVF to prevent dehydration (MD ordered 0.9% sodium chloride @ 100 mL/hr to start today). Bedscale weight is up 6.2 kg compared with bedscale. Would continue with standing weights as often as possible. Estimated Nutrition Needs:   Kcals/day: 1649 Kcals/day (9607-1550 kcal/day (11-15 kcal/kg))  Protein: 120 g (120-150 kcal/day (0.8-1 g/kg))  Fluid: 2250 ml (or 1 mL/kcal or per output)     Based On: Kcal/kg - specify (Comment)  Weight Used: Actual wt (149.9 kg)    RD to follow.     [address]

## 2018-04-17 NOTE — PROGRESS NOTES
Bedside and Verbal shift change report given to Stephanie Dennis RN (oncoming nurse) by Paige Warren RN (offgoing nurse). Report included the following information SBAR, Kardex, ED Summary, Intake/Output, MAR and Recent Results.

## 2018-04-18 LAB
GLUCOSE BLD STRIP.AUTO-MCNC: 104 MG/DL (ref 65–100)
GLUCOSE BLD STRIP.AUTO-MCNC: 108 MG/DL (ref 65–100)
GLUCOSE BLD STRIP.AUTO-MCNC: 114 MG/DL (ref 65–100)
SERVICE CMNT-IMP: ABNORMAL

## 2018-04-18 PROCEDURE — 3331090002 HH PPS REVENUE DEBIT

## 2018-04-18 PROCEDURE — 65270000029 HC RM PRIVATE

## 2018-04-18 PROCEDURE — 97530 THERAPEUTIC ACTIVITIES: CPT

## 2018-04-18 PROCEDURE — 74011250637 HC RX REV CODE- 250/637: Performed by: NURSE PRACTITIONER

## 2018-04-18 PROCEDURE — 97116 GAIT TRAINING THERAPY: CPT

## 2018-04-18 PROCEDURE — 3331090001 HH PPS REVENUE CREDIT

## 2018-04-18 PROCEDURE — 74011250637 HC RX REV CODE- 250/637: Performed by: INTERNAL MEDICINE

## 2018-04-18 PROCEDURE — 74011250637 HC RX REV CODE- 250/637: Performed by: PHYSICAL MEDICINE & REHABILITATION

## 2018-04-18 PROCEDURE — 77030010520

## 2018-04-18 PROCEDURE — 74011250636 HC RX REV CODE- 250/636: Performed by: NURSE PRACTITIONER

## 2018-04-18 PROCEDURE — 77030010541

## 2018-04-18 PROCEDURE — 74011250636 HC RX REV CODE- 250/636

## 2018-04-18 PROCEDURE — 74011250636 HC RX REV CODE- 250/636: Performed by: SURGERY

## 2018-04-18 PROCEDURE — 77030019952 HC CANSTR VAC ASST KCON -B

## 2018-04-18 PROCEDURE — 74011250637 HC RX REV CODE- 250/637: Performed by: PHYSICIAN ASSISTANT

## 2018-04-18 PROCEDURE — 97606 NEG PRS WND THER DME>50 SQCM: CPT

## 2018-04-18 PROCEDURE — 77030011641 HC PASTE OST ADH BMS -A

## 2018-04-18 PROCEDURE — 77030018717 HC DRSG GRNUFM KCON -B

## 2018-04-18 PROCEDURE — 82962 GLUCOSE BLOOD TEST: CPT

## 2018-04-18 RX ORDER — MORPHINE SULFATE 4 MG/ML
INJECTION, SOLUTION INTRAMUSCULAR; INTRAVENOUS
Status: COMPLETED
Start: 2018-04-18 | End: 2018-04-18

## 2018-04-18 RX ADMIN — SODIUM CHLORIDE 100 ML/HR: 900 INJECTION, SOLUTION INTRAVENOUS at 17:29

## 2018-04-18 RX ADMIN — DRONABINOL 2.5 MG: 2.5 CAPSULE ORAL at 17:12

## 2018-04-18 RX ADMIN — ONDANSETRON HYDROCHLORIDE 4 MG: 2 INJECTION INTRAMUSCULAR; INTRAVENOUS at 13:08

## 2018-04-18 RX ADMIN — MORPHINE SULFATE 5 MG: 10 INJECTION, SOLUTION INTRAMUSCULAR; INTRAVENOUS at 20:23

## 2018-04-18 RX ADMIN — PROCHLORPERAZINE EDISYLATE 5 MG: 5 INJECTION INTRAMUSCULAR; INTRAVENOUS at 05:44

## 2018-04-18 RX ADMIN — ONDANSETRON HYDROCHLORIDE 4 MG: 2 INJECTION INTRAMUSCULAR; INTRAVENOUS at 17:12

## 2018-04-18 RX ADMIN — MORPHINE SULFATE 45 MG: 30 TABLET ORAL at 17:12

## 2018-04-18 RX ADMIN — MORPHINE SULFATE 45 MG: 30 TABLET ORAL at 12:46

## 2018-04-18 RX ADMIN — Medication 10 ML: at 09:42

## 2018-04-18 RX ADMIN — Medication 10 ML: at 13:10

## 2018-04-18 RX ADMIN — ENOXAPARIN SODIUM 40 MG: 100 INJECTION SUBCUTANEOUS at 09:39

## 2018-04-18 RX ADMIN — MORPHINE SULFATE 5 MG: 10 INJECTION, SOLUTION INTRAMUSCULAR; INTRAVENOUS at 15:46

## 2018-04-18 RX ADMIN — SODIUM CHLORIDE 100 ML/HR: 900 INJECTION, SOLUTION INTRAVENOUS at 07:26

## 2018-04-18 RX ADMIN — PROCHLORPERAZINE EDISYLATE 5 MG: 5 INJECTION INTRAMUSCULAR; INTRAVENOUS at 14:33

## 2018-04-18 RX ADMIN — ONDANSETRON HYDROCHLORIDE 4 MG: 2 INJECTION INTRAMUSCULAR; INTRAVENOUS at 03:00

## 2018-04-18 RX ADMIN — MORPHINE SULFATE 115 MG: 15 TABLET, EXTENDED RELEASE ORAL at 23:32

## 2018-04-18 RX ADMIN — ONDANSETRON HYDROCHLORIDE 4 MG: 2 INJECTION INTRAMUSCULAR; INTRAVENOUS at 21:37

## 2018-04-18 RX ADMIN — Medication 10 ML: at 05:44

## 2018-04-18 RX ADMIN — MORPHINE SULFATE 45 MG: 30 TABLET ORAL at 03:00

## 2018-04-18 RX ADMIN — MORPHINE SULFATE 100 MG: 100 TABLET, FILM COATED, EXTENDED RELEASE ORAL at 07:26

## 2018-04-18 RX ADMIN — ASPIRIN 325 MG: 325 TABLET ORAL at 09:37

## 2018-04-18 RX ADMIN — MORPHINE SULFATE 100 MG: 100 TABLET, FILM COATED, EXTENDED RELEASE ORAL at 15:46

## 2018-04-18 RX ADMIN — MORPHINE SULFATE 45 MG: 30 TABLET ORAL at 00:38

## 2018-04-18 RX ADMIN — MORPHINE SULFATE 5 MG: 4 INJECTION, SOLUTION INTRAMUSCULAR; INTRAVENOUS at 11:01

## 2018-04-18 RX ADMIN — PROCHLORPERAZINE EDISYLATE 5 MG: 5 INJECTION INTRAMUSCULAR; INTRAVENOUS at 20:23

## 2018-04-18 RX ADMIN — Medication 10 ML: at 13:09

## 2018-04-18 RX ADMIN — Medication 10 ML: at 21:41

## 2018-04-18 RX ADMIN — DRONABINOL 2.5 MG: 2.5 CAPSULE ORAL at 09:38

## 2018-04-18 RX ADMIN — MORPHINE SULFATE 45 MG: 30 TABLET ORAL at 05:45

## 2018-04-18 RX ADMIN — MORPHINE SULFATE 45 MG: 30 TABLET ORAL at 09:37

## 2018-04-18 RX ADMIN — MORPHINE SULFATE 45 MG: 30 TABLET ORAL at 14:33

## 2018-04-18 NOTE — WOUND CARE
WOCN Note:     Follow-up visit for [facility] dressing placement.     Chart shows:  Admitted for fistula takdown 3/7/18 by Dr. Susana Palacio with [facility] placement in [address] [place] [address]; history of multiple abdominal surgeries and Crohns disease. Admitted from home. Mother able to provide assistance in pouching prior to admission and became very competent in doing so. Seen today with Dr. Hiral Luke.      Assessment:   Patient is A&O x 4, continent and mobile - moves around room with assistance & has been working with PT. Alvino Carvajal  Bed: total care bariatric  Pre-medicated by RN, Jay Barnard  1. Midline abdominal surgical wound see previous note for measurements. 85% red granular wound base and 15% stomatized mucus membrane centrally with peristalsis. Periwound with some irritation from effluent - [place] applied. Red rubber cath in stoma for feedings. 1 piece of black sponge with central hole to allow for pouching of the fistulas; 2 Hawk's rings placed around hole top and bottom to achieve seal, paste added.  After seal achieved, a convex pouch was placed to collect fistula output. 50 mmHg continuous suction.       Goal is to find a system for use at home until wound is small enough to pouch (we are getting close to a pouchable size).     Colostomy: stoma is red & moist; moderate amount of mucoid tan output; peristomal skin intact with mucosal transplantation noted; 2-piece flat pouch changed today.       Abdominal folds MASD resolving nicely with stoma powder used as needed.       Wound Recommendations:    Continue NPWT as ordered @ 50 mmHg using black sponge and change Monday, Wednesday, & Friday. Discussed above plan with patient & RN, Dagmar Garsia. Transition of Care: Plan to follow as needed while admitted to hospital with next [facility] change on Friday.      YOLANDA GroveN, RN, [facility]  Certified Wound, Ostomy, Continence Nurse  office [phone]  pager [phone] or call  to page

## 2018-04-18 NOTE — PROGRESS NOTES
Problem: Mobility Impaired (Adult and Pediatric)  Goal: *Acute Goals and Plan of Care (Insert Text)  Physical Therapy Goals  Update 4/18/2018  1. Patient will move from supine to sit and sit to supine  in bed with minimal assistance  within 7 day(s). 2.  Patient will transfer from bed to chair and chair to bed with minimal assistance  using the least restrictive device within 7 day(s). 3.  Patient will perform sit to stand with SBA within 7 days  4. Patient will ambulate 150' with SBA x 1 using LRAD within 7 days. Update 4/6/2018  1. Patient will move from supine to sit and sit to supine  in bed with moderate assistance  within 7 day(s). 2.  Patient will transfer from bed to chair and chair to bed with moderate assistance  using the least restrictive device within 7 day(s). 3.  Patient will perform sit to stand with CGA within 7 days  4. Patient will ambulate 22' with moderate assist x 1 using LRAD within 7 days. Revised 3/27/2018  1. Patient will move from supine to sit and sit to supine  in bed with moderate assistance  within 7 day(s). 2.  Patient will transfer from bed to chair and chair to bed with moderate assistance  using the least restrictive device within 7 day(s). 3.  Patient will perform sit to stand with moderate assistance  within 7 day(s). 4.  Patient will ambulate 100' with moderate assist x1 using the least restrictive device within 7 day(s). Revisited 3/19/2018, goals remain appropriate, carry over    Physical Therapy Goals  Initiated 3/8/2018  1. Patient will move from supine to sit and sit to supine  in bed with moderate assistance  within 7 day(s). 2.  Patient will transfer from bed to chair and chair to bed with moderate assistance  using the least restrictive device within 7 day(s). 3.  Patient will perform sit to stand with moderate assistance  within 7 day(s). 4.  PT will assess gait with the least restrictive device within 7 day(s).           physical Therapy TREATMENT: WEEKLY REASSESSMENT  Patient: Pradeep Love (38 y.o. female)  Date: 4/18/2018  Diagnosis: FISTULA  Small bowel fistula  INTEROCUTANOUS FISTULA <principal problem not specified>  Procedure(s) (LRB):  OPEN JEJUNOSTOMY TUBE PLACEMENT UNDER FLURO/ WOUND VAC CHANGE. (N/A) 6 Days Post-Op  Precautions: Fall, Skin  Chart, physical therapy assessment, plan of care and goals were reviewed. ASSESSMENT:  Patient making slow progress towards goals, very motivated and eager to ambulate today. Pt requested to again stand from bed in chair position, says he just got wound vac changed and did not want to practice bed mobility. Pt stood with CGA, then ambulated 100 ft using RW with SBA/CGA for safety. Pt required 2 standing rest breaks due to fatigue/SOB. Pt left up in chair at end of session. Recommend inpatient rehab at discharge for continued mobility progression. Pt very motivated and well below baseline level of function. Patient's progression toward goals since last assessment: ambulation goal met, goals revised this date      PLAN:  Goals have been updated based on progression since last assessment. Patient continues to benefit from skilled intervention to address the above impairments. Continue to follow the patient 5 times a week to address goals.   Planned Interventions:  [x]              Bed Mobility Training             []       Neuromuscular Re-Education  [x]              Transfer Training                   []       Orthotic/Prosthetic Training  [x]              Gait Training                         []       Modalities  [x]              Therapeutic Exercises           []       Edema Management/Control  [x]              Therapeutic Activities            [x]       Patient and Family Training/Education  []              Other (comment):  Discharge Recommendations: Inpatient Rehab  Further Equipment Recommendations for Discharge: none      SUBJECTIVE:   Patient stated I'm sorry I didn't do PT yesterday, I just really wasn't feeling well. I'm ready to walk today though.     OBJECTIVE DATA SUMMARY:   Critical Behavior:  Neurologic State: Alert  Orientation Level: Oriented X4  Cognition: Appropriate decision making, Appropriate for age attention/concentration, Appropriate safety awareness  Safety/Judgement: Good awareness of safety precautions    Strength:   Strength: Generally decreased, functional                      Functional Mobility Training:  Bed Mobility:     Supine to Sit:  (NT - stood from bed in chair position)              Transfers:  Sit to Stand: Contact guard assistance (stood from bed in chair position)  Stand to Sit: Contact guard assistance                             Balance:  Sitting: Intact; With support  Standing: Impaired; With support  Standing - Static: Good  Standing - Dynamic : Fair  Ambulation/Gait Training:  Distance (ft): 100 Feet (ft) (2 standing rest breaks due to fatigue/SOB)  Assistive Device: Gait belt;Walker, rolling  Ambulation - Level of Assistance: Stand-by assistance        Gait Abnormalities: Decreased step clearance;Trunk sway increased        Base of Support: Widened     Speed/Liz: Shuffled; Slow  Step Length: Left shortened;Right shortened          Pain:  Pain Scale 1: Numeric (0 - 10)  Pain Intensity 1: 7  Pain Location 1: Abdomen  Pain Orientation 1: Anterior  Pain Description 1: Throbbing  Pain Intervention(s) 1: Medication (see MAR)  Activity Tolerance:   VSS  Please refer to the flowsheet for vital signs taken during this treatment.   After treatment:   [x]  Patient left in no apparent distress sitting up in chair  []  Patient left in no apparent distress in bed  [x]  Call bell left within reach  [x]  Nursing notified  []  Caregiver present  []  Bed alarm activated    COMMUNICATION/COLLABORATION:   The patients plan of care was discussed with: Registered Nurse    Kirby Parmar PT   Time Calculation: 30 mins

## 2018-04-18 NOTE — PROGRESS NOTES
Bedside shift change report given to Peggy (oncoming nurse) by Daniel Mcdonald (offgoing nurse). Report included the following information SBAR, Kardex, Intake/Output, MAR and Recent Results.

## 2018-04-18 NOTE — PROGRESS NOTES
Progress Note    Patient: Maik Luu MRN: [id number]  SSN: [id number]    YOB: 1975  Age: 38 y.o. Sex: female      Admit Date: 3/7/2018    6 Days Post-Op    Procedure:  Procedure(s):  OPEN JEJUNOSTOMY TUBE PLACEMENT UNDER FLURO/ WOUND VAC CHANGE. Subjective:     Patient had tube feeds increased to 40 and had a small amount of fullness but is tolerable. Objective:     Visit Vitals    /72    Pulse 94    Temp 98.1 °F (36.7 °C)    Resp 16    Ht 5' 4\" (1.626 m)    Wt 338 lb 10 oz (153.6 kg)    SpO2 98%    BMI 58.13 kg/m2       Temp (24hrs), Av.5 °F (36.9 °C), Min:98.1 °F (36.7 °C), Max:99 °F (37.2 °C)      Physical Exam:    Gen- Alert in NAD  Abd- wound with granulation tissue. Fistula present. Data Review: images and reports reviewed    Lab Review: All lab results for the last 24 hours reviewed. Recent Results (from the past 24 hour(s))   GLUCOSE, POC    Collection Time: 18  6:32 PM   Result Value Ref Range    Glucose (POC) 105 (H) 65 - 100 mg/dL    Performed by Carol Trujillo    GLUCOSE, POC    Collection Time: 18  7:28 AM   Result Value Ref Range    Glucose (POC) 114 (H) 65 - 100 mg/dL    Performed by Rebel Ugarte        Assessment:     Hospital Problems  Date Reviewed: 10/27/2017          Codes Class Noted POA    Small bowel fistula ICD-10-CM: K63.2  ICD-9-CM: 569.81  3/7/2018 Unknown              Plan/Recommendations/Medical Decision Making:   Continue vac  Advance tube feeds - Once tolerating may be able to go to rehab.     Signed By: Leona Xavier MD     2018

## 2018-04-18 NOTE — PROGRESS NOTES
1636:  Patient has lipids ordered for this evening, but no TPN orders. MD on called paged. 1700:  Dr. Noe Reeder notified. Orders received to DC lipids. Bedside shift change report given to MAURI Beltre (oncoming nurse) by Greta Aldana RN (offgoing nurse). Report included the following information SBAR, Intake/Output and MAR.

## 2018-04-18 NOTE — PROGRESS NOTES
Reviewed medical chart; [facility] is still following this patient's case. Called and spoke with Lulú Dickerson at [address] - [phone]. Dr. Angeles Marcos, medical director, will review the case today. Care Management will continue to follow her disposition.    ROBERT Shafer

## 2018-04-18 NOTE — PROGRESS NOTES
Patient with nausea but no emesis overnight, tolerated tube feed rate change fairly. Bedside shift change report given to Yunior Lopez (oncoming nurse) by Daniel Marquez RN (offgoing nurse). Report included the following information SBAR, Kardex, Intake/Output, MAR, Accordion and Recent Results.

## 2018-04-19 LAB
ANION GAP SERPL CALC-SCNC: 7 MMOL/L (ref 5–15)
BUN SERPL-MCNC: 9 MG/DL (ref 6–20)
BUN/CREAT SERPL: 13 (ref 12–20)
CALCIUM SERPL-MCNC: 8.6 MG/DL (ref 8.5–10.1)
CHLORIDE SERPL-SCNC: 110 MMOL/L (ref 97–108)
CO2 SERPL-SCNC: 22 MMOL/L (ref 21–32)
CREAT SERPL-MCNC: 0.67 MG/DL (ref 0.55–1.02)
ERYTHROCYTE [DISTWIDTH] IN BLOOD BY AUTOMATED COUNT: 18.6 % (ref 11.5–14.5)
GLUCOSE BLD STRIP.AUTO-MCNC: 89 MG/DL (ref 65–100)
GLUCOSE BLD STRIP.AUTO-MCNC: 98 MG/DL (ref 65–100)
GLUCOSE SERPL-MCNC: 107 MG/DL (ref 65–100)
HCT VFR BLD AUTO: 26.2 % (ref 35–47)
HGB BLD-MCNC: 8.2 G/DL (ref 11.5–16)
MAGNESIUM SERPL-MCNC: 1.4 MG/DL (ref 1.6–2.4)
MCH RBC QN AUTO: 29.9 PG (ref 26–34)
MCHC RBC AUTO-ENTMCNC: 31.3 G/DL (ref 30–36.5)
MCV RBC AUTO: 95.6 FL (ref 80–99)
NRBC # BLD: 0 K/UL (ref 0–0.01)
NRBC BLD-RTO: 0 PER 100 WBC
PHOSPHATE SERPL-MCNC: 4.2 MG/DL (ref 2.6–4.7)
PLATELET # BLD AUTO: 374 K/UL (ref 150–400)
PMV BLD AUTO: 8.9 FL (ref 8.9–12.9)
POTASSIUM SERPL-SCNC: 3.7 MMOL/L (ref 3.5–5.1)
RBC # BLD AUTO: 2.74 M/UL (ref 3.8–5.2)
SERVICE CMNT-IMP: NORMAL
SERVICE CMNT-IMP: NORMAL
SODIUM SERPL-SCNC: 139 MMOL/L (ref 136–145)
WBC # BLD AUTO: 12 K/UL (ref 3.6–11)

## 2018-04-19 PROCEDURE — 97530 THERAPEUTIC ACTIVITIES: CPT

## 2018-04-19 PROCEDURE — 3331090002 HH PPS REVENUE DEBIT

## 2018-04-19 PROCEDURE — 82962 GLUCOSE BLOOD TEST: CPT

## 2018-04-19 PROCEDURE — 3331090001 HH PPS REVENUE CREDIT

## 2018-04-19 PROCEDURE — 74011250637 HC RX REV CODE- 250/637: Performed by: NURSE PRACTITIONER

## 2018-04-19 PROCEDURE — 84100 ASSAY OF PHOSPHORUS: CPT | Performed by: SURGERY

## 2018-04-19 PROCEDURE — 74011250636 HC RX REV CODE- 250/636: Performed by: NURSE PRACTITIONER

## 2018-04-19 PROCEDURE — 74011250637 HC RX REV CODE- 250/637: Performed by: INTERNAL MEDICINE

## 2018-04-19 PROCEDURE — 36415 COLL VENOUS BLD VENIPUNCTURE: CPT | Performed by: SURGERY

## 2018-04-19 PROCEDURE — 85027 COMPLETE CBC AUTOMATED: CPT | Performed by: SURGERY

## 2018-04-19 PROCEDURE — 80048 BASIC METABOLIC PNL TOTAL CA: CPT | Performed by: SURGERY

## 2018-04-19 PROCEDURE — 65270000029 HC RM PRIVATE

## 2018-04-19 PROCEDURE — 74011250636 HC RX REV CODE- 250/636: Performed by: SURGERY

## 2018-04-19 PROCEDURE — 77030018798 HC PMP KT ENTRL FED COVD -A

## 2018-04-19 PROCEDURE — 74011250637 HC RX REV CODE- 250/637: Performed by: PHYSICAL MEDICINE & REHABILITATION

## 2018-04-19 PROCEDURE — 83735 ASSAY OF MAGNESIUM: CPT | Performed by: SURGERY

## 2018-04-19 PROCEDURE — 74011250637 HC RX REV CODE- 250/637: Performed by: PHYSICIAN ASSISTANT

## 2018-04-19 PROCEDURE — 97116 GAIT TRAINING THERAPY: CPT

## 2018-04-19 RX ORDER — MAGNESIUM SULFATE HEPTAHYDRATE 40 MG/ML
2 INJECTION, SOLUTION INTRAVENOUS ONCE
Status: COMPLETED | OUTPATIENT
Start: 2018-04-19 | End: 2018-04-27

## 2018-04-19 RX ORDER — MORPHINE SULFATE 4 MG/ML
5 INJECTION, SOLUTION INTRAMUSCULAR; INTRAVENOUS
Status: DISCONTINUED | OUTPATIENT
Start: 2018-04-19 | End: 2018-04-21

## 2018-04-19 RX ORDER — MORPHINE SULFATE 4 MG/ML
2 INJECTION, SOLUTION INTRAMUSCULAR; INTRAVENOUS
Status: DISCONTINUED | OUTPATIENT
Start: 2018-04-19 | End: 2018-04-19

## 2018-04-19 RX ADMIN — MORPHINE SULFATE 45 MG: 30 TABLET ORAL at 21:48

## 2018-04-19 RX ADMIN — Medication 10 ML: at 09:37

## 2018-04-19 RX ADMIN — MORPHINE SULFATE 45 MG: 30 TABLET ORAL at 17:58

## 2018-04-19 RX ADMIN — MORPHINE SULFATE 5 MG: 10 INJECTION, SOLUTION INTRAMUSCULAR; INTRAVENOUS at 02:24

## 2018-04-19 RX ADMIN — MORPHINE SULFATE 45 MG: 30 TABLET ORAL at 12:33

## 2018-04-19 RX ADMIN — DRONABINOL 2.5 MG: 2.5 CAPSULE ORAL at 17:57

## 2018-04-19 RX ADMIN — Medication 10 ML: at 21:15

## 2018-04-19 RX ADMIN — MORPHINE SULFATE 100 MG: 100 TABLET, FILM COATED, EXTENDED RELEASE ORAL at 16:19

## 2018-04-19 RX ADMIN — Medication 10 ML: at 05:03

## 2018-04-19 RX ADMIN — PROCHLORPERAZINE EDISYLATE 5 MG: 5 INJECTION INTRAMUSCULAR; INTRAVENOUS at 08:12

## 2018-04-19 RX ADMIN — MORPHINE SULFATE 45 MG: 30 TABLET ORAL at 01:10

## 2018-04-19 RX ADMIN — MORPHINE SULFATE 45 MG: 30 TABLET ORAL at 05:03

## 2018-04-19 RX ADMIN — SODIUM CHLORIDE 100 ML/HR: 900 INJECTION, SOLUTION INTRAVENOUS at 12:35

## 2018-04-19 RX ADMIN — MORPHINE SULFATE 5 MG: 10 INJECTION, SOLUTION INTRAMUSCULAR; INTRAVENOUS at 17:55

## 2018-04-19 RX ADMIN — ASPIRIN 325 MG: 325 TABLET ORAL at 09:35

## 2018-04-19 RX ADMIN — Medication 10 ML: at 15:11

## 2018-04-19 RX ADMIN — SODIUM CHLORIDE 100 ML/HR: 900 INJECTION, SOLUTION INTRAVENOUS at 03:29

## 2018-04-19 RX ADMIN — MORPHINE SULFATE 5 MG: 4 INJECTION, SOLUTION INTRAMUSCULAR; INTRAVENOUS at 21:11

## 2018-04-19 RX ADMIN — MORPHINE SULFATE 45 MG: 30 TABLET ORAL at 09:35

## 2018-04-19 RX ADMIN — Medication 10 ML: at 15:10

## 2018-04-19 RX ADMIN — MORPHINE SULFATE 5 MG: 10 INJECTION, SOLUTION INTRAMUSCULAR; INTRAVENOUS at 11:31

## 2018-04-19 RX ADMIN — SODIUM CHLORIDE 100 ML/HR: 900 INJECTION, SOLUTION INTRAVENOUS at 23:58

## 2018-04-19 RX ADMIN — MORPHINE SULFATE 100 MG: 100 TABLET, FILM COATED, EXTENDED RELEASE ORAL at 08:06

## 2018-04-19 RX ADMIN — MORPHINE SULFATE 115 MG: 15 TABLET, EXTENDED RELEASE ORAL at 23:06

## 2018-04-19 RX ADMIN — DRONABINOL 2.5 MG: 2.5 CAPSULE ORAL at 09:35

## 2018-04-19 RX ADMIN — ENOXAPARIN SODIUM 40 MG: 100 INJECTION SUBCUTANEOUS at 09:35

## 2018-04-19 RX ADMIN — PROCHLORPERAZINE EDISYLATE 5 MG: 5 INJECTION INTRAMUSCULAR; INTRAVENOUS at 21:03

## 2018-04-19 RX ADMIN — MAGNESIUM SULFATE HEPTAHYDRATE 2 G: 40 INJECTION, SOLUTION INTRAVENOUS at 17:58

## 2018-04-19 RX ADMIN — MORPHINE SULFATE 45 MG: 30 TABLET ORAL at 15:11

## 2018-04-19 RX ADMIN — ONDANSETRON HYDROCHLORIDE 4 MG: 2 INJECTION INTRAMUSCULAR; INTRAVENOUS at 15:06

## 2018-04-19 NOTE — PROGRESS NOTES
Progress Note    Patient: Maria Luz Blevins MRN: [id number]  SSN: [id number]    YOB: 1975  Age: 38 y.o. Sex: female      Admit Date: 3/7/2018    7 Days Post-Op    Procedure:  Procedure(s):  OPEN JEJUNOSTOMY TUBE PLACEMENT UNDER FLURO/ WOUND VAC CHANGE. Subjective:     Patient now on full tube feeds. Objective:     Visit Vitals    BP 97/55    Pulse (!) 103    Temp 98.8 °F (37.1 °C)    Resp 18    Ht 5' 4\" (1.626 m)    Wt 314 lb (142.4 kg)    SpO2 99%    BMI 53.9 kg/m2       Temp (24hrs), Av.6 °F (37 °C), Min:98.4 °F (36.9 °C), Max:98.8 °F (37.1 °C)      Physical Exam:     Gen- alert in NAD  Abd- s/nt  Vac in place    Data Review: images and reports reviewed    Lab Review: All lab results for the last 24 hours reviewed.   Recent Results (from the past 24 hour(s))   GLUCOSE, POC    Collection Time: 18  8:26 PM   Result Value Ref Range    Glucose (POC) 104 (H) 65 - 100 mg/dL    Performed by Kristi Michel (PCT)    GLUCOSE, POC    Collection Time: 18  6:59 AM   Result Value Ref Range    Glucose (POC) 98 65 - 100 mg/dL    Performed by Kristi Michel (PCT)    CBC W/O DIFF    Collection Time: 18  7:34 AM   Result Value Ref Range    WBC 12.0 (H) 3.6 - 11.0 K/uL    RBC 2.74 (L) 3.80 - 5.20 M/uL    HGB 8.2 (L) 11.5 - 16.0 g/dL    HCT 26.2 (L) 35.0 - 47.0 %    MCV 95.6 80.0 - 99.0 FL    MCH 29.9 26.0 - 34.0 PG    MCHC 31.3 30.0 - 36.5 g/dL    RDW 18.6 (H) 11.5 - 14.5 %    PLATELET 885 362 - 489 K/uL    MPV 8.9 8.9 - 12.9 FL    NRBC 0.0 0  WBC    ABSOLUTE NRBC 0.00 0.00 - 5.97 K/uL   METABOLIC PANEL, BASIC    Collection Time: 18  7:34 AM   Result Value Ref Range    Sodium 139 136 - 145 mmol/L    Potassium 3.7 3.5 - 5.1 mmol/L    Chloride 110 (H) 97 - 108 mmol/L    CO2 22 21 - 32 mmol/L    Anion gap 7 5 - 15 mmol/L    Glucose 107 (H) 65 - 100 mg/dL    BUN 9 6 - 20 MG/DL    Creatinine 0.67 0.55 - 1.02 MG/DL    BUN/Creatinine ratio 13 12 - 20      GFR est AA >60 >60 ml/min/1.73m2    GFR est non-AA >60 >60 ml/min/1.73m2    Calcium 8.6 8.5 - 10.1 MG/DL   MAGNESIUM    Collection Time: 04/19/18  7:34 AM   Result Value Ref Range    Magnesium 1.4 (L) 1.6 - 2.4 mg/dL   PHOSPHORUS    Collection Time: 04/19/18  7:34 AM   Result Value Ref Range    Phosphorus 4.2 2.6 - 4.7 MG/DL       Assessment:     Hospital Problems  Date Reviewed: 10/27/2017          Codes Class Noted POA    Small bowel fistula ICD-10-CM: K63.2  ICD-9-CM: 569.81  3/7/2018 Unknown              Plan/Recommendations/Medical Decision Making:   Getting medically ready for discharge- unsure placement plans at this point  Replete hypomagnesemia  Continue tube feeds.      Signed By: Eve Toussaint MD     April 19, 2018

## 2018-04-19 NOTE — PROGRESS NOTES
Problem: Mobility Impaired (Adult and Pediatric)  Goal: *Acute Goals and Plan of Care (Insert Text)  Physical Therapy Goals  Update 4/18/2018  1. Patient will move from supine to sit and sit to supine  in bed with minimal assistance  within 7 day(s). 2.  Patient will transfer from bed to chair and chair to bed with minimal assistance  using the least restrictive device within 7 day(s). 3.  Patient will perform sit to stand with SBA within 7 days  4. Patient will ambulate 150' with SBA x 1 using LRAD within 7 days. Update 4/6/2018  1. Patient will move from supine to sit and sit to supine  in bed with moderate assistance  within 7 day(s). 2.  Patient will transfer from bed to chair and chair to bed with moderate assistance  using the least restrictive device within 7 day(s). 3.  Patient will perform sit to stand with CGA within 7 days  4. Patient will ambulate 22' with moderate assist x 1 using LRAD within 7 days. Revised 3/27/2018  1. Patient will move from supine to sit and sit to supine  in bed with moderate assistance  within 7 day(s). 2.  Patient will transfer from bed to chair and chair to bed with moderate assistance  using the least restrictive device within 7 day(s). 3.  Patient will perform sit to stand with moderate assistance  within 7 day(s). 4.  Patient will ambulate 100' with moderate assist x1 using the least restrictive device within 7 day(s). Revisited 3/19/2018, goals remain appropriate, carry over    Physical Therapy Goals  Initiated 3/8/2018  1. Patient will move from supine to sit and sit to supine  in bed with moderate assistance  within 7 day(s). 2.  Patient will transfer from bed to chair and chair to bed with moderate assistance  using the least restrictive device within 7 day(s). 3.  Patient will perform sit to stand with moderate assistance  within 7 day(s). 4.  PT will assess gait with the least restrictive device within 7 day(s).            physical Therapy TREATMENT  Patient: Billy Chandler (38 y.o. female)  Date: 4/19/2018  Diagnosis: FISTULA  Small bowel fistula  INTEROCUTANOUS FISTULA <principal problem not specified>  Procedure(s) (LRB):  OPEN JEJUNOSTOMY TUBE PLACEMENT UNDER FLURO/ WOUND VAC CHANGE. (N/A) 7 Days Post-Op  Precautions: Fall, Skin  Chart, physical therapy assessment, plan of care and goals were reviewed. ASSESSMENT:  Pt agreeable to therapy. Pt is hesitant to sit EOB but was able with encouragement. Pt required Min A for trunk. Pt's sit to stand is variable depending on height of the chair. Pt required mod A x2 from a standard height chair and CGA from elevated height. During gait pt reported severe stabbing pain in the left side of back. Pt was unable to continue gait. Pt had an emergent sitting in chair. Pt attempted to stand and ambulate back to room but unable. Stretcher chair brought to pt and returned to room. Pt was able to stand pivot transfer back to bed shuffling feet back to bed. Pt required mod A for bilateral LE to place pt back to bed. Pt reports ambulating without A.D prior to 2/16/2018. Pt is currently needing rolling walker for safety. Pt has a goal of being able to perform steps for home access. Pt could benefit from inpt rehab to progress. Progression toward goals:  [x]    Improving appropriately and progressing toward goals  []    Improving slowly and progressing toward goals  []    Not making progress toward goals and plan of care will be adjusted     PLAN:  Patient continues to benefit from skilled intervention to address the above impairments. Continue treatment per established plan of care. Discharge Recommendations:  Inpatient Rehab  Further Equipment Recommendations for Discharge:  TBD     SUBJECTIVE:   Patient stated its a stabbing pain.     OBJECTIVE DATA SUMMARY:   Critical Behavior:  Neurologic State: Alert  Orientation Level: Oriented X4  Cognition: Follows commands  Safety/Judgement: Good awareness of safety precautions  Functional Mobility Training:  Bed Mobility:     Supine to Sit: Minimum assistance (to EOB)              Transfers:  Sit to Stand: Moderate assistance;Assist x2 (from a standard height chair. CGA from elevated surface. )  Stand to Sit: Contact guard assistance (decrease control)                             Balance:  Sitting: Intact  Standing: Impaired  Standing - Static: Good  Standing - Dynamic : Fair  Ambulation/Gait Training:  Distance (ft): 80 Feet (ft) (then pt reports severe stabbing back pain requiring to sit. )  Assistive Device: Walker, rolling  Ambulation - Level of Assistance: Stand-by assistance        Gait Abnormalities: Decreased step clearance        Base of Support: Widened     Speed/Liz: Slow  Step Length: Left shortened;Right shortened                    Stairs:              Neuro Re-Education:    Therapeutic Exercises:     Pain:  Pain Scale 1: Numeric (0 - 10)  Pain Intensity 1: 8  Pain Location 1: Abdomen;Back  Pain Orientation 1: Mid  Pain Description 1: Aching (spasms)  Pain Intervention(s) 1: Medication (see MAR)  Activity Tolerance:   Limited   Please refer to the flowsheet for vital signs taken during this treatment.   After treatment:   []    Patient left in no apparent distress sitting up in chair  [x]    Patient left in no apparent distress in bed  [x]    Call bell left within reach  [x]    Nursing notified  []    Caregiver present  []    Bed alarm activated    COMMUNICATION/COLLABORATION:   The patients plan of care was discussed with: Registered Nurse    Joan Gomez PTA   Time Calculation: 39 mins

## 2018-04-19 NOTE — PROGRESS NOTES
Problem: Self Care Deficits Care Plan (Adult)  Goal: *Acute Goals and Plan of Care (Insert Text)  Occupational Therapy Goals  Reevaluated 4/10/2018  1. Patient will complete grooming activity sitting EOB without support for 10 minutes within 7 days. 2.  Patient will complete BSC transfer with min A x 2 persons within 7 days. 3.  Patient will complete lower body dressing activities with min A within 7 days. 4.  Patient will perform standing for clothing management tasks (toileting or dressing) with min A x 2 persons within 7 days. 5.  Patient will complete UE exercise program independently within 7 days. Initiated 4/4/2018  1. Patient will perform rolling and pulling self up to head of bed with min assist to assist with ADL's at bed level within 7 day(s). 2.  Patient will perform static sitting edge of bed > or = 10 minutes with supervision/set-up within 7 day(s). 3.  Patient will perform static standing with bilateral UE support on rolling walker > or = 3 minutes with minimal assistance/contact guard assist x's 2 within 7 day(s). 4.  Patient will perform toilet transfers with moderate assistance x's 2 to bedside commode within 7 days. 5.  Patient will perform bilateral UE AROM and strengthening exercises throughout day and grade exercises prn to increase demand within 7 day(s). Occupational Therapy TREATMENT  Patient: Lidya Beckett (38 y.o. female)  Date: 4/19/2018  Diagnosis: FISTULA  Small bowel fistula  INTEROCUTANOUS FISTULA <principal problem not specified>  Procedure(s) (LRB):  OPEN JEJUNOSTOMY TUBE PLACEMENT UNDER FLURO/ WOUND VAC CHANGE. (N/A) 7 Days Post-Op  Precautions: Fall, Skin  Chart, occupational therapy assessment, plan of care, and goals were reviewed. ASSESSMENT:  Patient received sitting in chair in hallway with PT. Patient started having lower back spasms and unable to continue with her walk. OT returned with bariatric chair from room.   Patient needed min assist x 2 to achieve sit to stand with RW. Bed mobility at min assist level 2/2 patient unable to bring LEs intot the bed from lower back p! .  Nursing present in room. Will hold OT for the rest of the day. Patient did report that they are installing a new grab bar in bathroom at home per OT recommendations. Progression toward goals:  []       Improving appropriately and progressing toward goals  [x]       Improving slowly and progressing toward goals  []       Not making progress toward goals and plan of care will be adjusted     PLAN:  Patient continues to benefit from skilled intervention to address the above impairments. Continue treatment per established plan of care. Discharge Recommendations:  Inpatient Rehab  Further Equipment Recommendations for Discharge:  TBD     SUBJECTIVE:   Patient stated It hurts.     OBJECTIVE DATA SUMMARY:   Cognitive/Behavioral Status:  Neurologic State: Alert  Orientation Level: Oriented X4                Functional Mobility and Transfers for ADLs:  Bed Mobility:  Supine to Sit: Minimum assistance (to EOB)  Sit to Supine: Assist x1;Minimum assistance    Transfers:  Sit to Stand: Minimum assistance;Assist x2          Balance:  Sitting: Intact  Standing: Intact; With support  Standing - Static: Good  Standing - Dynamic : Fair           Pain:  Pain Scale 1: Numeric (0 - 10)  Pain Intensity 1: 6  Pain Location 1: Abdomen;Back  Pain Orientation 1: Mid  Pain Description 1: Aching  Pain Intervention(s) 1: Medication (see MAR) (scheduled)  Activity Tolerance:   Fair  Please refer to the flowsheet for vital signs taken during this treatment.   After treatment:   [] Patient left in no apparent distress sitting up in chair  [x] Patient left in no apparent distress in bed  [] Call bell left within reach  [x] Nursing notified  [x] Caregiver present  [] Bed alarm activated    COMMUNICATION/COLLABORATION:   The patients plan of care was discussed with: Physical Therapist and Registered Nurse    Harrison Harris Sav OTR/L  Time Calculation: 26 mins

## 2018-04-19 NOTE — PROGRESS NOTES
Spoke with [address] liaison, Keshia Ferreira, [place]#[phone], who confirmed that their MD approved the patient, but [employer] will still need to authorize a stay, which may be challenging. Spoke with the patient in regards to discharge planning. If [employer] denies the patient for IP Rehabilitation, she does not want to return to [facility] nor pursue a skilled nursing facility. Her preference if denied by inpatient rehabilitation would be home health. This  would need an order from the MD for [facility] health PT and skilled nursing for tube feed education and wound care per [facility] note. \"  Preliminary referrals sent to [facility] for tube feeds and [facility] IN[facility]. Spoke with the WOCN, who indicated that the patient may need a pouch at discharge rather than a wound vac, so she plans to hold off on completing a wound vac form. Care Management will continue to follow her disposition.    ROBERT Hayden

## 2018-04-19 NOTE — PROGRESS NOTES
NUTRITION COMPLETE ASSESSMENT    RECOMMENDATIONS:   1. Add Prosource Liquid BID and increase flush to 50 mL q3 hours  -- Of Note: goal flush is 100 mL q 3 hours  -- Continue IVF    2. Document all po liquid intake in I/O's (RN aware) to better assess fluid balance    3. Once pt on goal feedings for another 2-3 days, could consider checking UUN (with 24 hour urine) to help assess absorption and adequacy of protein in tube feeding regimen    4. If pt continues to tolerate goal feedings, can consider increasing tube feeding rate to allow for more time off the pump during the day (72 mL/hr x 20 hours, then 80 mL/hr x 18 hours, then 90 mL/hr x 18 hours)     Interventions/Plan:   Food/Nutrient Delivery: EN support via red rubber J-tube    Assessment:   Reason for Assessment:   [x]Reassessment     Diet:  Mech Soft  Nutritionally Significant Medications: [x] Reviewed & Includes:  0.9% sodium @ 100 mL/hr; marinol twice/day; humalog correction scale; zofran q 4 hours prn; compazine q6 hours prn; scopolamine patch q 72 hours  Meal Intake: Patient Vitals for the past 100 hrs:   % Diet Eaten   04/18/18 0939 0 %     Subjective:  Pt in good spirits, but felt bad about having a back spasm earlier today while working with PT. She is also embarrassed at the foul smell from her ileostomy. She is making an effort to drink, but it's unclear the volume of po intake (fluids or solids) daily. Objective:  Chart reviewed, discussed with RN. Pt at goal tube feeding (rate of 60 mL/hr). It's unclear when this was increased (not documented), but it should've happened ~0500 this morning. Ileostomy emptied prior to visit. Pt states, it wasn't \"that much\", but the smell was foul. Based on I/O's, ileostomy output has been limited (250 mL, 150 mL). That said, fistula output has been copious. Pt has been making effort to eat and drink, but gets full quickly.     Will need to better document oral fluid intake to better assess fistula output and determine whether or not it is primarily oral liquids or GI losses. Fistula Output:   -- 4300 mL so far today  -- 2450 mL 4/18  -- 4800 mL 4/17    Estimated Nutrition Needs:   Kcals/day: 5814 Kcals/day (0611-2787 kcal/day (11-15 kcal/kg))  Protein: 120 g (120-150 kcal/day (0.8-1 g/kg))  Fluid: 2250 ml (or 1 mL/kcal or per output)     Based On: Kcal/kg - specify (Comment)  Weight Used: Actual wt (149.9 kg)    Pt expected to meet estimated nutrient needs:  [x]   Yes, but difficult to predict at this time secondary to high fistula output    Nutrition Diagnosis:   1. 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 Less than optimal parenteral nutrition related to low volume/concentration TPN as evidenced by resolved.     Goals:     Pt to meet at least 90% of estimated needs via nutrition support over the next 5-7 days     Monitoring & Evaluation:    - Enteral/parenteral nutrition intake   - Weight/weight change   -      Previous Nutrition Goals Met:  Yes  Previous Recommendations:      Yes    Education & Discharge Needs:   [] None Identified   [x] Identified and addressed    [x] Participated in care plan, discharge planning, and/or interdisciplinary rounds        Cultural, Islam and ethnic food preferences identified:  NONE      Skin Integrity: []Intact  [x]Other: see wound vac/wound documentation  Edema: [x]None []Other: 1-3+  Last BM: 25 mL ileostomy output recorded to so far today  Food Allergies: [x]None []Other    Anthropometrics:    Weight Loss Metrics 4/19/2018 3/7/2018 3/5/2018 2/28/2018 2/27/2018 2/27/2018 2/26/2018   Today's Wt 314 lb - 304 lb 3.2 oz 306 lb 306 lb 8 oz - 307 lb   BMI - 53.9 kg/m2 52.22 kg/m2 52.52 kg/m2 52.61 kg/m2 - 52.7 kg/m2      Last 3 Recorded Weights in this Encounter    04/17/18 0740 04/18/18 0547 04/19/18 0707   Weight: 154.8 kg (341 lb 3.2 oz) 153.6 kg (338 lb 10 oz) 142.4 kg (314 lb)      Weight Source: Bed  Height: 5' 4\" (162.6 cm),    Body mass index is 53.9 kg/(m^2). IBW : 54.4 kg (120 lb),    Usual Body Weight: 135.2 kg (298 lb) (1/2018),      Labs:  Lab Results   Component Value Date/Time    Sodium 139 04/19/2018 07:34 AM    Potassium 3.7 04/19/2018 07:34 AM    Chloride 110 (H) 04/19/2018 07:34 AM    CO2 22 04/19/2018 07:34 AM    Glucose 107 (H) 04/19/2018 07:34 AM    BUN 9 04/19/2018 07:34 AM    Creatinine 0.67 04/19/2018 07:34 AM    Calcium 8.6 04/19/2018 07:34 AM    Magnesium 1.4 (L) 04/19/2018 07:34 AM    Phosphorus 4.2 04/19/2018 07:34 AM    Albumin 1.9 (L) 04/15/2018 04:59 AM     No results found for: HBA1C, HGBE8, [id number], [id number]  Lab Results   Component Value Date/Time    Glucose 107 (H) 04/19/2018 07:34 AM    Glucose (POC) 98 04/19/2018 06:59 AM      Lab Results   Component Value Date/Time    ALT (SGPT) 41 04/15/2018 04:59 AM    AST (SGOT) 52 (H) 04/15/2018 04:59 AM    Alk.  phosphatase 139 (H) 04/15/2018 04:59 AM    Bilirubin, direct 0.1 07/07/2009 06:38 PM    Bilirubin, total 0.4 04/15/2018 04:59 AM      [address]

## 2018-04-20 LAB
GLUCOSE BLD STRIP.AUTO-MCNC: 104 MG/DL (ref 65–100)
GLUCOSE BLD STRIP.AUTO-MCNC: 113 MG/DL (ref 65–100)
SERVICE CMNT-IMP: ABNORMAL
SERVICE CMNT-IMP: ABNORMAL

## 2018-04-20 PROCEDURE — 97116 GAIT TRAINING THERAPY: CPT

## 2018-04-20 PROCEDURE — 97530 THERAPEUTIC ACTIVITIES: CPT

## 2018-04-20 PROCEDURE — 77030019952 HC CANSTR VAC ASST KCON -B

## 2018-04-20 PROCEDURE — 82962 GLUCOSE BLOOD TEST: CPT

## 2018-04-20 PROCEDURE — 74011250637 HC RX REV CODE- 250/637: Performed by: PHYSICIAN ASSISTANT

## 2018-04-20 PROCEDURE — 97606 NEG PRS WND THER DME>50 SQCM: CPT

## 2018-04-20 PROCEDURE — 77030010520

## 2018-04-20 PROCEDURE — 74011250636 HC RX REV CODE- 250/636: Performed by: SURGERY

## 2018-04-20 PROCEDURE — 74011250636 HC RX REV CODE- 250/636: Performed by: NURSE PRACTITIONER

## 2018-04-20 PROCEDURE — 74011250637 HC RX REV CODE- 250/637: Performed by: INTERNAL MEDICINE

## 2018-04-20 PROCEDURE — 65270000029 HC RM PRIVATE

## 2018-04-20 PROCEDURE — 74011250637 HC RX REV CODE- 250/637: Performed by: NURSE PRACTITIONER

## 2018-04-20 PROCEDURE — 77030028894 HC DRSG MEDIH CA ALG INLC -B

## 2018-04-20 PROCEDURE — 3331090002 HH PPS REVENUE DEBIT

## 2018-04-20 PROCEDURE — 77030018717 HC DRSG GRNUFM KCON -B

## 2018-04-20 PROCEDURE — 77030018798 HC PMP KT ENTRL FED COVD -A

## 2018-04-20 PROCEDURE — 77030010545

## 2018-04-20 PROCEDURE — 74011250637 HC RX REV CODE- 250/637: Performed by: PHYSICAL MEDICINE & REHABILITATION

## 2018-04-20 PROCEDURE — 3331090001 HH PPS REVENUE CREDIT

## 2018-04-20 RX ADMIN — MORPHINE SULFATE 45 MG: 30 TABLET ORAL at 18:18

## 2018-04-20 RX ADMIN — Medication 10 ML: at 07:38

## 2018-04-20 RX ADMIN — Medication 10 ML: at 21:09

## 2018-04-20 RX ADMIN — Medication 10 ML: at 12:59

## 2018-04-20 RX ADMIN — DRONABINOL 2.5 MG: 2.5 CAPSULE ORAL at 09:48

## 2018-04-20 RX ADMIN — DRONABINOL 2.5 MG: 2.5 CAPSULE ORAL at 18:18

## 2018-04-20 RX ADMIN — MORPHINE SULFATE 45 MG: 30 TABLET ORAL at 21:08

## 2018-04-20 RX ADMIN — MORPHINE SULFATE 5 MG: 10 INJECTION, SOLUTION INTRAMUSCULAR; INTRAVENOUS at 11:10

## 2018-04-20 RX ADMIN — Medication 10 ML: at 13:05

## 2018-04-20 RX ADMIN — NYSTATIN: 100000 CREAM TOPICAL at 18:00

## 2018-04-20 RX ADMIN — MORPHINE SULFATE 5 MG: 4 INJECTION, SOLUTION INTRAMUSCULAR; INTRAVENOUS at 00:54

## 2018-04-20 RX ADMIN — MORPHINE SULFATE 5 MG: 4 INJECTION, SOLUTION INTRAMUSCULAR; INTRAVENOUS at 17:40

## 2018-04-20 RX ADMIN — Medication 10 ML: at 20:08

## 2018-04-20 RX ADMIN — MORPHINE SULFATE 45 MG: 30 TABLET ORAL at 01:31

## 2018-04-20 RX ADMIN — PROCHLORPERAZINE EDISYLATE 5 MG: 5 INJECTION INTRAMUSCULAR; INTRAVENOUS at 21:07

## 2018-04-20 RX ADMIN — MORPHINE SULFATE 45 MG: 30 TABLET ORAL at 12:57

## 2018-04-20 RX ADMIN — MORPHINE SULFATE 5 MG: 10 INJECTION, SOLUTION INTRAMUSCULAR; INTRAVENOUS at 20:06

## 2018-04-20 RX ADMIN — MORPHINE SULFATE 5 MG: 4 INJECTION, SOLUTION INTRAMUSCULAR; INTRAVENOUS at 04:13

## 2018-04-20 RX ADMIN — ASPIRIN 325 MG: 325 TABLET ORAL at 09:48

## 2018-04-20 RX ADMIN — MORPHINE SULFATE 5 MG: 4 INJECTION, SOLUTION INTRAMUSCULAR; INTRAVENOUS at 07:35

## 2018-04-20 RX ADMIN — MORPHINE SULFATE 45 MG: 30 TABLET ORAL at 09:47

## 2018-04-20 RX ADMIN — MORPHINE SULFATE 5 MG: 4 INJECTION, SOLUTION INTRAMUSCULAR; INTRAVENOUS at 14:11

## 2018-04-20 RX ADMIN — MORPHINE SULFATE 45 MG: 30 TABLET ORAL at 04:50

## 2018-04-20 RX ADMIN — Medication 10 ML: at 17:42

## 2018-04-20 RX ADMIN — MORPHINE SULFATE 100 MG: 100 TABLET, FILM COATED, EXTENDED RELEASE ORAL at 08:22

## 2018-04-20 RX ADMIN — ENOXAPARIN SODIUM 40 MG: 100 INJECTION SUBCUTANEOUS at 09:49

## 2018-04-20 RX ADMIN — MORPHINE SULFATE 100 MG: 100 TABLET, FILM COATED, EXTENDED RELEASE ORAL at 15:57

## 2018-04-20 RX ADMIN — PROCHLORPERAZINE EDISYLATE 5 MG: 5 INJECTION INTRAMUSCULAR; INTRAVENOUS at 17:42

## 2018-04-20 NOTE — PROGRESS NOTES
Problem: Self Care Deficits Care Plan (Adult)  Goal: *Acute Goals and Plan of Care (Insert Text)  Occupational Therapy Goals  Reevaluated 4/20/2018  1. Patient will complete grooming activity sitting EOB without support for 10 minutes within 7 days  2. Patient will complete BSC transfer with min A x 1 persons within 7 days. 3. Patient will complete lower body dressing activities with supervision within 7 days using AE  4. Patient will perform standing for clothing management tasks (toileting or dressing) with min A x 1 persons within 7 days. 5. Patient will complete UE exercise program independently within 7 days. Reevaluated 4/10/2018  1. Patient will complete grooming activity sitting EOB without support for 10 minutes within 7 days. 2.  Patient will complete BSC transfer with min A x 2 persons within 7 days. 3.  Patient will complete lower body dressing activities with min A within 7 days. 4.  Patient will perform standing for clothing management tasks (toileting or dressing) with min A x 2 persons within 7 days. 5.  Patient will complete UE exercise program independently within 7 days. Initiated 4/4/2018  1. Patient will perform rolling and pulling self up to head of bed with min assist to assist with ADL's at bed level within 7 day(s). 2.  Patient will perform static sitting edge of bed > or = 10 minutes with supervision/set-up within 7 day(s). 3.  Patient will perform static standing with bilateral UE support on rolling walker > or = 3 minutes with minimal assistance/contact guard assist x's 2 within 7 day(s). 4.  Patient will perform toilet transfers with moderate assistance x's 2 to bedside commode within 7 days. 5.  Patient will perform bilateral UE AROM and strengthening exercises throughout day and grade exercises prn to increase demand within 7 day(s). Occupational Therapy TREATMENT: WEEKLY REASSESSMENT  Patient:  Anil Mckeon (73 y.o. female)  Date: 4/20/2018  Diagnosis: FISTULA  Small bowel fistula  INTEROCUTANOUS FISTULA <principal problem not specified>  Procedure(s) (LRB):  OPEN JEJUNOSTOMY TUBE PLACEMENT UNDER FLURO/ WOUND VAC CHANGE. (N/A) 8 Days Post-Op  Precautions: Fall, Skin  Chart, occupational therapy assessment, plan of care, and goals were reviewed. ASSESSMENT:  Patient presents at [address] [place] [address] assist with LB self-care and toileting. Patient has experienced a few set backs (ie back spasms) with ambulation. Prior to Feb. 16th, patient was able to walk without RW. Today, worked towards having patient walk with HHAx1 to improve independence and confidence for safe ambulation and functional transfers. Patient was able tolerate HHAx1 within hospital room, however, still needs RW for increase distance. At this time, patient has increase fear using hospital bathroom 2/2 too small to [employer]. Patient has become comfortable using female urinal due to not liking BSC because she can't clean herself seated on commode. Discussed placing extra wide BSC in front of hospital bed so she partially stand by pulling up on bed rail to properly clean self. Patient verbalized understanding and is willing to try next visit. It will also simulate how her bathroom is set-up  at home. Feel patient would benefit from short rehab stay to improve with endurance and achieve mod I with all self-care task. Will con't to follow. Progression toward goals:  []            Improving appropriately and progressing toward goals  [x]            Improving slowly and progressing toward goals  []            Not making progress toward goals and plan of care will be adjusted     PLAN:  Goals have been updated based on progression since last assessment. Patient continues to benefit from skilled intervention to address the above impairments. Continue to follow patient 5 times a week to address goals.   Planned Interventions:  [x]                    Self Care Training                  [x] Therapeutic Activities  [x]                    Functional Mobility Training    []             Cognitive Retraining  []                    Therapeutic Exercises           [x]             Endurance Activities  [x]                    Balance Training                   []             Neuromuscular Re-Education  []                    Visual/Perceptual Training     [x]        Home Safety Training  [x]                    Patient Education                 [x]             Family Training/Education  []                    Other (comment):  Discharge Recommendations: Rehab  Further Equipment Recommendations for Discharge: TBD      SUBJECTIVE:   Patient stated I'm willing to try that.     OBJECTIVE DATA SUMMARY:   Cognitive/Behavioral Status:  Neurologic State: Alert  Orientation Level: Oriented X4  Cognition: Appropriate decision making             Functional Mobility and Transfers for ADLs:  Bed Mobility:  Supine to Sit: Contact guard assistance    Transfers:  Sit to Stand: Contact guard assistance           Balance:  Sitting: Intact  Standing: Intact; With support  Standing - Static: Good    ADL Intervention:       Grooming  Grooming Assistance: Supervision/set up    Upper Body Bathing  Bathing Assistance: Minimum assistance    Lower Body Bathing  Lower Body : Moderate assistance    Upper Body [address]: Minimum  assistance    Lower Body Dressing Assistance  Dressing Assistance: Minimum assistance  Adaptive Equipment Used: Reacher;Sock aid    Toileting  Toileting Assistance: Minimum assistance  Bladder Hygiene: Minimum assistance  Bowel Hygiene: Moderate assistance         Pain:  Pain Scale 1: Numeric (0 - 10)  Pain Intensity 1: 8  Pain Location 1: Abdomen  Pain Orientation 1: Mid  Pain Description 1: Throbbing  Pain Intervention(s) 1: Medication (see MAR)  Activity Tolerance:   Fair  Please refer to the flowsheet for vital signs taken during this treatment.   After treatment:   [x] Patient left in no apparent distress sitting up in chair  [] Patient left in no apparent distress in bed  [x] Call bell left within reach  [x] Nursing notified  [] Caregiver present  [] Bed alarm activated    COMMUNICATION/COLLABORATION:   The patients plan of care was discussed with: Physical Therapist    PATRICE Meza/L  Time Calculation: 24 mins

## 2018-04-20 NOTE — PROGRESS NOTES
No complaints today. Tolerating tube feeds at goal rate. Tm 98.9 HR: 109BP: 101/57 Resp Rate: 20 99% sat on room air. Intake/Output Summary (Last 24 hours) at 04/20/18 0927  Last data filed at 04/20/18 0641   Gross per 24 hour   Intake             4145 ml   Output             8020 ml   Net            -3875 ml   Exam: Cor: RRR. Lungs: Bilateral breath sounds. Clear to auscultation. Abd: Soft. Non distended. Non tender. Wound vac in place. Ileostomy working. Feeding tube in place. Labs:   Recent Results (from the past 12 hour(s))   GLUCOSE, POC    Collection Time: 04/20/18  6:28 AM   Result Value Ref Range    Glucose (POC) 113 (H) 65 - 100 mg/dL    Performed by Nilton Cuenca    Continue tube feeds as tolerated - will change to 72ml/hour for 20 hours. Increase flush to 100ml q 3hours. Continue mechanical soft die. Saline lock IVF. DVT prophylaxis - Lovenox. PT and OT following. Wound vac therapy per wound care nurses. Discharge planning.

## 2018-04-20 NOTE — PROGRESS NOTES
NUTRITION brief    Recommendations:   1. Thena Rosin 1/2 a packet of Prosource 4x/day   -mix 1/2 packet Prosource w/ 15mL water. Infuse 4x/day at 0800, 1200, 1600, 2000 (total of 2 pkts/day)    2. Continue Peptamen @ 72ml/hr      **Pt noting swelling of left abdominal wall to left of ostomy appliance -  pt reports forgetting to tell surgery earlier today. RN aware. Pt visited today per RN request. Ngozi Bernard started via red rubber J-tube today. Pt complaining of increase in pressure and discomfort when Prosource infused via tube using syringe despite slow infusion. Not recommended to mix Prosource with tube feed in bag-set so discussed alternative options to insure adequate protein. Pt agreeable to dividing Prosource/water doses into 1/2 packet 4x/day (instead of 1 packet 2x/day). Discussed with RN and order adjusted. Will continue with goals and monitoring/evaluation per previous note.      Augustina Harris RD

## 2018-04-20 NOTE — PROGRESS NOTES
Problem: Mobility Impaired (Adult and Pediatric)  Goal: *Acute Goals and Plan of Care (Insert Text)  Physical Therapy Goals  Update 4/18/2018  1. Patient will move from supine to sit and sit to supine  in bed with minimal assistance  within 7 day(s). 2.  Patient will transfer from bed to chair and chair to bed with minimal assistance  using the least restrictive device within 7 day(s). 3.  Patient will perform sit to stand with SBA within 7 days  4. Patient will ambulate 150' with SBA x 1 using LRAD within 7 days. Update 4/6/2018  1. Patient will move from supine to sit and sit to supine  in bed with moderate assistance  within 7 day(s). 2.  Patient will transfer from bed to chair and chair to bed with moderate assistance  using the least restrictive device within 7 day(s). 3.  Patient will perform sit to stand with CGA within 7 days  4. Patient will ambulate 22' with moderate assist x 1 using LRAD within 7 days. Revised 3/27/2018  1. Patient will move from supine to sit and sit to supine  in bed with moderate assistance  within 7 day(s). 2.  Patient will transfer from bed to chair and chair to bed with moderate assistance  using the least restrictive device within 7 day(s). 3.  Patient will perform sit to stand with moderate assistance  within 7 day(s). 4.  Patient will ambulate 100' with moderate assist x1 using the least restrictive device within 7 day(s). Revisited 3/19/2018, goals remain appropriate, carry over    Physical Therapy Goals  Initiated 3/8/2018  1. Patient will move from supine to sit and sit to supine  in bed with moderate assistance  within 7 day(s). 2.  Patient will transfer from bed to chair and chair to bed with moderate assistance  using the least restrictive device within 7 day(s). 3.  Patient will perform sit to stand with moderate assistance  within 7 day(s). 4.  PT will assess gait with the least restrictive device within 7 day(s).            physical Therapy TREATMENT  Patient: Laurie Bailey (38 y.o. female)  Date: 4/20/2018  Diagnosis: FISTULA  Small bowel fistula  INTEROCUTANOUS FISTULA <principal problem not specified>  Procedure(s) (LRB):  OPEN JEJUNOSTOMY TUBE PLACEMENT UNDER FLURO/ WOUND VAC CHANGE. (N/A) 8 Days Post-Op  Precautions: Fall, Skin  Chart, physical therapy assessment, plan of care and goals were reviewed. ASSESSMENT:  Pt reports back feels slightly better but she still has tightness in the area. Pt also report increase fluid build on left side. Pt was able to sit EOB but required v.c for task. Pt was able to ambulate with min A   20feet x2. For increase distance pt needed rolling walker. Pt expressed anxiety about increasing back pain so limited gait today. Pt was ambulating independency with no A.D. In February. Pt is progressing off the rolling walker. Pt may benefit from inpt rehab at discharge    Progression toward goals:  [x]    Improving appropriately and progressing toward goals  []    Improving slowly and progressing toward goals  []    Not making progress toward goals and plan of care will be adjusted     PLAN:  Patient continues to benefit from skilled intervention to address the above impairments. Continue treatment per established plan of care. Discharge Recommendations:  Inpatient Rehab  Further Equipment Recommendations for Discharge:  TBD     SUBJECTIVE:   Patient stated I am scared .     OBJECTIVE DATA SUMMARY:   Critical Behavior:  Neurologic State: Alert  Orientation Level: Oriented X4  Cognition: Appropriate decision making  Safety/Judgement: Good awareness of safety precautions  Functional Mobility Training:  Bed Mobility:     Supine to Sit: Contact guard assistance              Transfers:  Sit to Stand: Contact guard assistance (elevated surface)  Stand to Sit: Contact guard assistance                             Balance:  Sitting: Intact  Standing: Intact; With support  Standing - Static: Good  Ambulation/Gait Trainin feet x2 with min A. Decrease step length, wide GILLIAN, cautious. 80 feet with rolling walker SBA slight increase back pain with gait                                             Stairs:              Neuro Re-Education:    Therapeutic Exercises:     Pain:  Pain Scale 1: Numeric (0 - 10)  Pain Intensity 1: 8  Pain Location 1: Abdomen;Back;Buttocks  Pain Orientation 1: Mid  Pain Description 1: Aching; Throbbing  Pain Intervention(s) 1: Medication (see MAR)  Activity Tolerance:   Limited   Please refer to the flowsheet for vital signs taken during this treatment.   After treatment:   [x]    Patient left in no apparent distress sitting up in chair  []    Patient left in no apparent distress in bed  [x]    Call bell left within reach  [x]    Nursing notified  []    Caregiver present  []    Bed alarm activated    COMMUNICATION/COLLABORATION:   The patients plan of care was discussed with: Registered Nurse    Gracie Pascual PTA   Time Calculation: 35 mins

## 2018-04-20 NOTE — WOUND CARE
WOCN Note:     Follow-up visit for [facility] dressing change and fistula pouching. Chart shows:  Admitted for fistula takdown 3/7/18 by Dr. Sandra Zuñiga with [facility] placement in [address] [place] [address]; history of multiple abdominal surgeries and Crohns disease. Admitted from home. Mother able to provide assistance in pouching prior to admission and became very competent in doing so.       Assessment:   Patient is A&O x 4, continent and mobile - moves around room with assistance & has been working with PT. Ko Izquierdo  Bed: total care bariatric  Pre-medicated by RN, Rachid Si  1. Midline abdominal surgical wound = 15 x 13.2 x 2.8 cm. 85% red granular wound base and 15% stomatized mucus membrane centrally with peristalsis. Periwound with slight redness - Marathon applied. Red rubber cath in stoma for feedings and secured to abdomen. 1 piece of black sponge with central hole to allow for pouching of the fistulas; 2 Hawk's rings placed around hole top and bottom to achieve seal, paste added.  After seal achieved, a convex pouch was placed to collect fistula output. 50 mmHg continuous suction.       Wound Recommendations:    Continue NPWT as described above if she is discharged to [address] [place] [address] but use just pouching if she is discharged home (no NPWT). While admitted, maintain VAC as ordered @ 50mmHg continuous suction using black sponge and change three times weekly. Skin Care & Pressure Relief Recommendations:  Minimize layers of linen/pads under patient to optimize support surface. Turn/reposition approximately every 2 hours and offload heels. Discussed above plan with patient & RN, Ayanna Bailey, . Transition of Care: Plan to follow as needed while admitted to hospital with [facility] dressing changes on Mondays, Wednesdays, & Fridays.      SONY Mann, RN, [facility]  Certified Wound, Ostomy, Continence Nurse  office [phone]  pager [phone] or call  to page

## 2018-04-21 LAB
ANION GAP SERPL CALC-SCNC: 10 MMOL/L (ref 5–15)
BUN SERPL-MCNC: 6 MG/DL (ref 6–20)
BUN/CREAT SERPL: 8 (ref 12–20)
CALCIUM SERPL-MCNC: 8.8 MG/DL (ref 8.5–10.1)
CHLORIDE SERPL-SCNC: 105 MMOL/L (ref 97–108)
CO2 SERPL-SCNC: 22 MMOL/L (ref 21–32)
CREAT SERPL-MCNC: 0.71 MG/DL (ref 0.55–1.02)
ERYTHROCYTE [DISTWIDTH] IN BLOOD BY AUTOMATED COUNT: 18.2 % (ref 11.5–14.5)
GLUCOSE BLD STRIP.AUTO-MCNC: 100 MG/DL (ref 65–100)
GLUCOSE BLD STRIP.AUTO-MCNC: 106 MG/DL (ref 65–100)
GLUCOSE SERPL-MCNC: 95 MG/DL (ref 65–100)
HCT VFR BLD AUTO: 27.8 % (ref 35–47)
HGB BLD-MCNC: 8.5 G/DL (ref 11.5–16)
MAGNESIUM SERPL-MCNC: 1.8 MG/DL (ref 1.6–2.4)
MCH RBC QN AUTO: 29.1 PG (ref 26–34)
MCHC RBC AUTO-ENTMCNC: 30.6 G/DL (ref 30–36.5)
MCV RBC AUTO: 95.2 FL (ref 80–99)
NRBC # BLD: 0 K/UL (ref 0–0.01)
NRBC BLD-RTO: 0 PER 100 WBC
PHOSPHATE SERPL-MCNC: 5 MG/DL (ref 2.6–4.7)
PLATELET # BLD AUTO: 410 K/UL (ref 150–400)
PMV BLD AUTO: 9.4 FL (ref 8.9–12.9)
POTASSIUM SERPL-SCNC: 3.8 MMOL/L (ref 3.5–5.1)
PREALB SERPL-MCNC: 8.2 MG/DL (ref 20–40)
RBC # BLD AUTO: 2.92 M/UL (ref 3.8–5.2)
SERVICE CMNT-IMP: ABNORMAL
SERVICE CMNT-IMP: NORMAL
SODIUM SERPL-SCNC: 137 MMOL/L (ref 136–145)
WBC # BLD AUTO: 11.6 K/UL (ref 3.6–11)

## 2018-04-21 PROCEDURE — 83735 ASSAY OF MAGNESIUM: CPT | Performed by: SURGERY

## 2018-04-21 PROCEDURE — 80048 BASIC METABOLIC PNL TOTAL CA: CPT | Performed by: SURGERY

## 2018-04-21 PROCEDURE — 74011250636 HC RX REV CODE- 250/636: Performed by: SURGERY

## 2018-04-21 PROCEDURE — 74011250637 HC RX REV CODE- 250/637: Performed by: PHYSICAL MEDICINE & REHABILITATION

## 2018-04-21 PROCEDURE — 3331090002 HH PPS REVENUE DEBIT

## 2018-04-21 PROCEDURE — 3331090001 HH PPS REVENUE CREDIT

## 2018-04-21 PROCEDURE — 74011250637 HC RX REV CODE- 250/637: Performed by: SURGERY

## 2018-04-21 PROCEDURE — 84100 ASSAY OF PHOSPHORUS: CPT | Performed by: SURGERY

## 2018-04-21 PROCEDURE — 74011250637 HC RX REV CODE- 250/637: Performed by: INTERNAL MEDICINE

## 2018-04-21 PROCEDURE — 77030011315 HC GEL TOP CADEXMR S&N -B

## 2018-04-21 PROCEDURE — 74011250636 HC RX REV CODE- 250/636

## 2018-04-21 PROCEDURE — 77030018717 HC DRSG GRNUFM KCON -B

## 2018-04-21 PROCEDURE — 74011250637 HC RX REV CODE- 250/637: Performed by: NURSE PRACTITIONER

## 2018-04-21 PROCEDURE — 36415 COLL VENOUS BLD VENIPUNCTURE: CPT | Performed by: SURGERY

## 2018-04-21 PROCEDURE — 74011250637 HC RX REV CODE- 250/637: Performed by: PHYSICIAN ASSISTANT

## 2018-04-21 PROCEDURE — 82962 GLUCOSE BLOOD TEST: CPT

## 2018-04-21 PROCEDURE — 85027 COMPLETE CBC AUTOMATED: CPT | Performed by: SURGERY

## 2018-04-21 PROCEDURE — 84134 ASSAY OF PREALBUMIN: CPT | Performed by: SURGERY

## 2018-04-21 PROCEDURE — 77030018798 HC PMP KT ENTRL FED COVD -A

## 2018-04-21 PROCEDURE — 65270000029 HC RM PRIVATE

## 2018-04-21 PROCEDURE — 74011250636 HC RX REV CODE- 250/636: Performed by: NURSE PRACTITIONER

## 2018-04-21 RX ORDER — MORPHINE SULFATE 4 MG/ML
5 INJECTION INTRAVENOUS
Status: DISCONTINUED | OUTPATIENT
Start: 2018-04-21 | End: 2018-04-25

## 2018-04-21 RX ORDER — MORPHINE SULFATE 4 MG/ML
INJECTION, SOLUTION INTRAMUSCULAR; INTRAVENOUS
Status: COMPLETED
Start: 2018-04-21 | End: 2018-04-21

## 2018-04-21 RX ADMIN — MORPHINE SULFATE 45 MG: 30 TABLET ORAL at 21:32

## 2018-04-21 RX ADMIN — MORPHINE SULFATE 100 MG: 100 TABLET, FILM COATED, EXTENDED RELEASE ORAL at 09:41

## 2018-04-21 RX ADMIN — NYSTATIN: 100000 CREAM TOPICAL at 18:00

## 2018-04-21 RX ADMIN — ENOXAPARIN SODIUM 40 MG: 100 INJECTION SUBCUTANEOUS at 09:46

## 2018-04-21 RX ADMIN — MORPHINE SULFATE 45 MG: 30 TABLET ORAL at 02:34

## 2018-04-21 RX ADMIN — MORPHINE SULFATE 100 MG: 100 TABLET, FILM COATED, EXTENDED RELEASE ORAL at 15:25

## 2018-04-21 RX ADMIN — Medication 10 ML: at 06:00

## 2018-04-21 RX ADMIN — ONDANSETRON HYDROCHLORIDE 4 MG: 2 INJECTION INTRAMUSCULAR; INTRAVENOUS at 13:56

## 2018-04-21 RX ADMIN — MORPHINE SULFATE 4 MG: 4 INJECTION, SOLUTION INTRAMUSCULAR; INTRAVENOUS at 08:02

## 2018-04-21 RX ADMIN — MORPHINE SULFATE 45 MG: 30 TABLET ORAL at 06:19

## 2018-04-21 RX ADMIN — MORPHINE SULFATE 115 MG: 15 TABLET, EXTENDED RELEASE ORAL at 00:00

## 2018-04-21 RX ADMIN — ONDANSETRON HYDROCHLORIDE 4 MG: 2 INJECTION INTRAMUSCULAR; INTRAVENOUS at 03:46

## 2018-04-21 RX ADMIN — DRONABINOL 2.5 MG: 2.5 CAPSULE ORAL at 18:36

## 2018-04-21 RX ADMIN — MORPHINE SULFATE 45 MG: 30 TABLET ORAL at 18:36

## 2018-04-21 RX ADMIN — PROCHLORPERAZINE EDISYLATE 5 MG: 5 INJECTION INTRAMUSCULAR; INTRAVENOUS at 18:37

## 2018-04-21 RX ADMIN — Medication 10 ML: at 18:37

## 2018-04-21 RX ADMIN — MORPHINE SULFATE 5 MG: 4 INJECTION INTRAVENOUS at 20:56

## 2018-04-21 RX ADMIN — MORPHINE SULFATE 45 MG: 30 TABLET ORAL at 09:41

## 2018-04-21 RX ADMIN — MORPHINE SULFATE 45 MG: 30 TABLET ORAL at 00:00

## 2018-04-21 RX ADMIN — LORAZEPAM 1 MG: 2 INJECTION INTRAMUSCULAR; INTRAVENOUS at 14:24

## 2018-04-21 RX ADMIN — MORPHINE SULFATE 45 MG: 30 TABLET ORAL at 13:51

## 2018-04-21 RX ADMIN — MORPHINE SULFATE 5 MG: 4 INJECTION INTRAVENOUS at 14:24

## 2018-04-21 RX ADMIN — ASPIRIN 325 MG: 325 TABLET ORAL at 09:41

## 2018-04-21 RX ADMIN — Medication 20 ML: at 20:57

## 2018-04-21 RX ADMIN — MORPHINE SULFATE 5 MG: 4 INJECTION INTRAVENOUS at 10:22

## 2018-04-21 RX ADMIN — MORPHINE SULFATE 45 MG: 30 TABLET ORAL at 15:25

## 2018-04-21 RX ADMIN — DRONABINOL 2.5 MG: 2.5 CAPSULE ORAL at 09:41

## 2018-04-21 RX ADMIN — MORPHINE SULFATE 115 MG: 15 TABLET, EXTENDED RELEASE ORAL at 23:17

## 2018-04-21 NOTE — PROGRESS NOTES
Bedside shift change report given to [facility] CHARBEL (oncoming nurse) by Aamir Crespo (offgoing nurse). Report included the following information SBAR     7245- Dr. Xiao Johnson made aware of that wound vac was removed due to fistula that was leaking per [facility]. Resume dressing changes for now. Patient reports wound vac was leaking excessively causing RN to remove wound vac. Dr. Xiao Johnson also made aware tube feeds were held.

## 2018-04-21 NOTE — PROGRESS NOTES
Progress Note    Patient: Acacia Almeida MRN: [id number]  SSN: [id number]    YOB: 1975  Age: 38 y.o. Sex: female      Admit Date: 3/7/2018    9 Days Post-Op    Procedure:  Procedure(s):  OPEN JEJUNOSTOMY TUBE PLACEMENT UNDER FLURO/ WOUND VAC CHANGE. Subjective:     No acute surgical issues. Wound vac leaked last night so had to be taken down. Pt reported mild nausea but no vomiting.     Objective:     Visit Vitals    /70 (BP 1 Location: Left arm, BP Patient Position: At rest)    Pulse 94    Temp 98 °F (36.7 °C)    Resp 20    Ht 5' 4\" (1.626 m)    Wt 324 lb 11.8 oz (147.3 kg)    SpO2 97%    BMI 55.74 kg/m2       Temp (24hrs), Av °F (37.2 °C), Min:98 °F (36.7 °C), Max:100 °F (37.8 °C)        Physical Exam:    Gen:  NAD  Pulm:  Unlabored  Abd:  S/ND/appropriate TTP  Wound:  Midline wound with granulating tissue and EC fistula    Recent Results (from the past 24 hour(s))   GLUCOSE, POC    Collection Time: 18  4:23 PM   Result Value Ref Range    Glucose (POC) 104 (H) 65 - 100 mg/dL    Performed by Orval Homans    CBC W/O DIFF    Collection Time: 18  3:12 AM   Result Value Ref Range    WBC 11.6 (H) 3.6 - 11.0 K/uL    RBC 2.92 (L) 3.80 - 5.20 M/uL    HGB 8.5 (L) 11.5 - 16.0 g/dL    HCT 27.8 (L) 35.0 - 47.0 %    MCV 95.2 80.0 - 99.0 FL    MCH 29.1 26.0 - 34.0 PG    MCHC 30.6 30.0 - 36.5 g/dL    RDW 18.2 (H) 11.5 - 14.5 %    PLATELET 760 (H) 727 - 400 K/uL    MPV 9.4 8.9 - 12.9 FL    NRBC 0.0 0  WBC    ABSOLUTE NRBC 0.00 0.00 - 4.70 K/uL   METABOLIC PANEL, BASIC    Collection Time: 18  3:12 AM   Result Value Ref Range    Sodium 137 136 - 145 mmol/L    Potassium 3.8 3.5 - 5.1 mmol/L    Chloride 105 97 - 108 mmol/L    CO2 22 21 - 32 mmol/L    Anion gap 10 5 - 15 mmol/L    Glucose 95 65 - 100 mg/dL    BUN 6 6 - 20 MG/DL    Creatinine 0.71 0.55 - 1.02 MG/DL    BUN/Creatinine ratio 8 (L) 12 - 20      GFR est AA >60 >60 ml/min/1.73m2    GFR est non-AA >60 >60 ml/min/1.73m2    Calcium 8.8 8.5 - 10.1 MG/DL   MAGNESIUM    Collection Time: 04/21/18  3:12 AM   Result Value Ref Range    Magnesium 1.8 1.6 - 2.4 mg/dL   PHOSPHORUS    Collection Time: 04/21/18  3:12 AM   Result Value Ref Range    Phosphorus 5.0 (H) 2.6 - 4.7 MG/DL   PREALBUMIN    Collection Time: 04/21/18  3:12 AM   Result Value Ref Range    Prealbumin 8.2 (L) 20.0 - 40.0 mg/dL   GLUCOSE, POC    Collection Time: 04/21/18  6:18 AM   Result Value Ref Range    Glucose (POC) 106 (H) 65 - 100 mg/dL    Performed by Radha King    GLUCOSE, POC    Collection Time: 04/21/18 11:59 AM   Result Value Ref Range    Glucose (POC) 100 65 - 100 mg/dL    Performed by Nikita Marshall          Assessment:     Hospital Problems  Date Reviewed: 10/27/2017          Codes Class Noted POA    Small bowel fistula ICD-10-CM: K63.2  ICD-9-CM: 569.81  3/7/2018 Unknown              Plan/Recommendations/Medical Decision Making:     - Resume trickle tube feeding  - Oral diet as tolerated  - Pain control  - Local wound care    Signed By: Marsha Crawley MD     April 21, 2018

## 2018-04-21 NOTE — PROGRESS NOTES
Ostomy leaking. Wound Vac leaking. Drainage was tube feeding color. Transparent tape removed. Vac sponge left in place. Excess drainage suctioned with yaunkers. Pt stated nausea and requested feeding be stopped. Peg tube put to gravity bag. Wound around stoma and on top of sponge dressed with 4x4 's, abd's, special tape, and transparent tape.

## 2018-04-21 NOTE — PROGRESS NOTES
Bedside shift change report given to [facility] CHARBEL (oncoming nurse) by Edel Thompson (offgoing nurse). Report included the following information SBAR.

## 2018-04-21 NOTE — PROGRESS NOTES
Spoke to Dr. Liz Vasquez because we were unsure if the Tube Feedings were to be continued and he said to hold off on the tube feedings for right now.

## 2018-04-21 NOTE — PROGRESS NOTES
Bedside and Verbal shift change report given to Danisha Machado RN (oncoming nurse) by Robert Leone RN (offgoing nurse). Report included the following information SBAR, Kardex, Intake/Output, MAR, Accordion, Recent Results and Med Rec Status.

## 2018-04-22 LAB
GLUCOSE BLD STRIP.AUTO-MCNC: 82 MG/DL (ref 65–100)
GLUCOSE BLD STRIP.AUTO-MCNC: 86 MG/DL (ref 65–100)
SERVICE CMNT-IMP: NORMAL
SERVICE CMNT-IMP: NORMAL

## 2018-04-22 PROCEDURE — 74011250637 HC RX REV CODE- 250/637: Performed by: PHYSICIAN ASSISTANT

## 2018-04-22 PROCEDURE — 74011250637 HC RX REV CODE- 250/637: Performed by: PHYSICAL MEDICINE & REHABILITATION

## 2018-04-22 PROCEDURE — 3331090001 HH PPS REVENUE CREDIT

## 2018-04-22 PROCEDURE — 3331090002 HH PPS REVENUE DEBIT

## 2018-04-22 PROCEDURE — 74011250636 HC RX REV CODE- 250/636: Performed by: SURGERY

## 2018-04-22 PROCEDURE — 74011250637 HC RX REV CODE- 250/637: Performed by: NURSE PRACTITIONER

## 2018-04-22 PROCEDURE — 65270000029 HC RM PRIVATE

## 2018-04-22 PROCEDURE — 74011250637 HC RX REV CODE- 250/637: Performed by: INTERNAL MEDICINE

## 2018-04-22 PROCEDURE — 74011250636 HC RX REV CODE- 250/636: Performed by: NURSE PRACTITIONER

## 2018-04-22 PROCEDURE — 82962 GLUCOSE BLOOD TEST: CPT

## 2018-04-22 RX ORDER — SODIUM CHLORIDE, SODIUM LACTATE, POTASSIUM CHLORIDE, CALCIUM CHLORIDE 600; 310; 30; 20 MG/100ML; MG/100ML; MG/100ML; MG/100ML
50 INJECTION, SOLUTION INTRAVENOUS CONTINUOUS
Status: DISCONTINUED | OUTPATIENT
Start: 2018-04-22 | End: 2018-04-30

## 2018-04-22 RX ADMIN — SODIUM CHLORIDE 500 ML: 900 INJECTION, SOLUTION INTRAVENOUS at 08:39

## 2018-04-22 RX ADMIN — ASPIRIN 325 MG: 325 TABLET ORAL at 09:04

## 2018-04-22 RX ADMIN — MORPHINE SULFATE 45 MG: 30 TABLET ORAL at 14:45

## 2018-04-22 RX ADMIN — Medication 10 ML: at 07:13

## 2018-04-22 RX ADMIN — SODIUM CHLORIDE, SODIUM LACTATE, POTASSIUM CHLORIDE, AND CALCIUM CHLORIDE 100 ML/HR: 600; 310; 30; 20 INJECTION, SOLUTION INTRAVENOUS at 18:56

## 2018-04-22 RX ADMIN — DRONABINOL 2.5 MG: 2.5 CAPSULE ORAL at 09:07

## 2018-04-22 RX ADMIN — MORPHINE SULFATE 5 MG: 4 INJECTION INTRAVENOUS at 22:09

## 2018-04-22 RX ADMIN — MORPHINE SULFATE 45 MG: 30 TABLET ORAL at 02:43

## 2018-04-22 RX ADMIN — Medication 10 ML: at 22:14

## 2018-04-22 RX ADMIN — MORPHINE SULFATE 45 MG: 30 TABLET ORAL at 20:45

## 2018-04-22 RX ADMIN — Medication 10 ML: at 13:59

## 2018-04-22 RX ADMIN — PROCHLORPERAZINE EDISYLATE 5 MG: 5 INJECTION INTRAMUSCULAR; INTRAVENOUS at 00:35

## 2018-04-22 RX ADMIN — MORPHINE SULFATE 45 MG: 30 TABLET ORAL at 17:36

## 2018-04-22 RX ADMIN — ENOXAPARIN SODIUM 40 MG: 100 INJECTION SUBCUTANEOUS at 09:04

## 2018-04-22 RX ADMIN — LORAZEPAM 1 MG: 2 INJECTION INTRAMUSCULAR; INTRAVENOUS at 02:43

## 2018-04-22 RX ADMIN — MORPHINE SULFATE 45 MG: 30 TABLET ORAL at 11:45

## 2018-04-22 RX ADMIN — MORPHINE SULFATE 5 MG: 4 INJECTION INTRAVENOUS at 16:57

## 2018-04-22 RX ADMIN — MORPHINE SULFATE 100 MG: 100 TABLET, FILM COATED, EXTENDED RELEASE ORAL at 16:56

## 2018-04-22 RX ADMIN — MORPHINE SULFATE 45 MG: 30 TABLET ORAL at 09:07

## 2018-04-22 RX ADMIN — ONDANSETRON HYDROCHLORIDE 4 MG: 2 INJECTION INTRAMUSCULAR; INTRAVENOUS at 13:59

## 2018-04-22 RX ADMIN — MORPHINE SULFATE 100 MG: 100 TABLET, FILM COATED, EXTENDED RELEASE ORAL at 07:13

## 2018-04-22 RX ADMIN — ONDANSETRON HYDROCHLORIDE 4 MG: 2 INJECTION INTRAMUSCULAR; INTRAVENOUS at 20:46

## 2018-04-22 RX ADMIN — ONDANSETRON HYDROCHLORIDE 4 MG: 2 INJECTION INTRAMUSCULAR; INTRAVENOUS at 04:15

## 2018-04-22 RX ADMIN — MORPHINE SULFATE 5 MG: 4 INJECTION INTRAVENOUS at 07:13

## 2018-04-22 RX ADMIN — MORPHINE SULFATE 115 MG: 15 TABLET, EXTENDED RELEASE ORAL at 23:29

## 2018-04-22 RX ADMIN — MORPHINE SULFATE 5 MG: 4 INJECTION INTRAVENOUS at 04:15

## 2018-04-22 RX ADMIN — MORPHINE SULFATE 45 MG: 30 TABLET ORAL at 06:00

## 2018-04-22 RX ADMIN — DRONABINOL 2.5 MG: 2.5 CAPSULE ORAL at 17:36

## 2018-04-22 RX ADMIN — MORPHINE SULFATE 5 MG: 10 INJECTION, SOLUTION INTRAMUSCULAR; INTRAVENOUS at 15:09

## 2018-04-22 RX ADMIN — MORPHINE SULFATE 5 MG: 4 INJECTION INTRAVENOUS at 00:35

## 2018-04-22 NOTE — PROGRESS NOTES
200 - Paged Dr. [place] Smiley because the patient has only voided 150 this shift after the bolus this morning.      200 - Dr. Catie Townsend ordered LR @ 100ml/hr for the patient

## 2018-04-22 NOTE — PROGRESS NOTES
Called pharmacy to check and see if we had any more Morphine 5mg and they said they are short, but will look into it because the pyxis does not have any loaded.

## 2018-04-22 NOTE — PROGRESS NOTES
Bedside and Verbal shift change report given to Lazaro Hart RN (oncoming nurse) by Brayden Aldana RN (offgoing nurse). Report included the following information SBAR, Kardex, ED Summary, Intake/Output, MAR and Recent Results.

## 2018-04-22 NOTE — PROGRESS NOTES
Nurse notified Dr. Salena Espinosa that patient only voided 200 ml over 12 hours. MD ordered a 500 ml bolus. Nurse will administer and pass along to day nurse.

## 2018-04-23 ENCOUNTER — APPOINTMENT (OUTPATIENT)
Dept: GENERAL RADIOLOGY | Age: 43
DRG: 330 | End: 2018-04-23
Attending: SURGERY
Payer: MEDICARE

## 2018-04-23 LAB
ANION GAP SERPL CALC-SCNC: 7 MMOL/L (ref 5–15)
BUN SERPL-MCNC: 6 MG/DL (ref 6–20)
BUN/CREAT SERPL: 8 (ref 12–20)
CALCIUM SERPL-MCNC: 8.8 MG/DL (ref 8.5–10.1)
CHLORIDE SERPL-SCNC: 103 MMOL/L (ref 97–108)
CO2 SERPL-SCNC: 25 MMOL/L (ref 21–32)
CREAT SERPL-MCNC: 0.71 MG/DL (ref 0.55–1.02)
ERYTHROCYTE [DISTWIDTH] IN BLOOD BY AUTOMATED COUNT: 17.7 % (ref 11.5–14.5)
GLUCOSE BLD STRIP.AUTO-MCNC: 113 MG/DL (ref 65–100)
GLUCOSE BLD STRIP.AUTO-MCNC: 93 MG/DL (ref 65–100)
GLUCOSE BLD STRIP.AUTO-MCNC: 98 MG/DL (ref 65–100)
GLUCOSE SERPL-MCNC: 91 MG/DL (ref 65–100)
HCT VFR BLD AUTO: 28.1 % (ref 35–47)
HGB BLD-MCNC: 8.8 G/DL (ref 11.5–16)
MAGNESIUM SERPL-MCNC: 1.6 MG/DL (ref 1.6–2.4)
MCH RBC QN AUTO: 29.6 PG (ref 26–34)
MCHC RBC AUTO-ENTMCNC: 31.3 G/DL (ref 30–36.5)
MCV RBC AUTO: 94.6 FL (ref 80–99)
NRBC # BLD: 0 K/UL (ref 0–0.01)
NRBC BLD-RTO: 0 PER 100 WBC
PHOSPHATE SERPL-MCNC: 4.4 MG/DL (ref 2.6–4.7)
PLATELET # BLD AUTO: 377 K/UL (ref 150–400)
PMV BLD AUTO: 9.2 FL (ref 8.9–12.9)
POTASSIUM SERPL-SCNC: 3.6 MMOL/L (ref 3.5–5.1)
RBC # BLD AUTO: 2.97 M/UL (ref 3.8–5.2)
SERVICE CMNT-IMP: ABNORMAL
SERVICE CMNT-IMP: NORMAL
SERVICE CMNT-IMP: NORMAL
SODIUM SERPL-SCNC: 135 MMOL/L (ref 136–145)
WBC # BLD AUTO: 9.8 K/UL (ref 3.6–11)

## 2018-04-23 PROCEDURE — 3331090001 HH PPS REVENUE CREDIT

## 2018-04-23 PROCEDURE — 85027 COMPLETE CBC AUTOMATED: CPT | Performed by: SURGERY

## 2018-04-23 PROCEDURE — 77030011641 HC PASTE OST ADH BMS -A

## 2018-04-23 PROCEDURE — 82962 GLUCOSE BLOOD TEST: CPT

## 2018-04-23 PROCEDURE — 77030019952 HC CANSTR VAC ASST KCON -B

## 2018-04-23 PROCEDURE — 36415 COLL VENOUS BLD VENIPUNCTURE: CPT | Performed by: SURGERY

## 2018-04-23 PROCEDURE — 3331090002 HH PPS REVENUE DEBIT

## 2018-04-23 PROCEDURE — 97530 THERAPEUTIC ACTIVITIES: CPT

## 2018-04-23 PROCEDURE — 77030018717 HC DRSG GRNUFM KCON -B

## 2018-04-23 PROCEDURE — 83735 ASSAY OF MAGNESIUM: CPT | Performed by: SURGERY

## 2018-04-23 PROCEDURE — 74011250636 HC RX REV CODE- 250/636: Performed by: SURGERY

## 2018-04-23 PROCEDURE — 65270000029 HC RM PRIVATE

## 2018-04-23 PROCEDURE — 74011250637 HC RX REV CODE- 250/637: Performed by: INTERNAL MEDICINE

## 2018-04-23 PROCEDURE — 80048 BASIC METABOLIC PNL TOTAL CA: CPT | Performed by: SURGERY

## 2018-04-23 PROCEDURE — 97116 GAIT TRAINING THERAPY: CPT

## 2018-04-23 PROCEDURE — 84100 ASSAY OF PHOSPHORUS: CPT | Performed by: SURGERY

## 2018-04-23 PROCEDURE — 77030010522

## 2018-04-23 PROCEDURE — 97110 THERAPEUTIC EXERCISES: CPT

## 2018-04-23 PROCEDURE — 74011250637 HC RX REV CODE- 250/637: Performed by: PHYSICIAN ASSISTANT

## 2018-04-23 PROCEDURE — 77030012918 HC BG WND FIST BMS -A

## 2018-04-23 PROCEDURE — 74011250637 HC RX REV CODE- 250/637: Performed by: NURSE PRACTITIONER

## 2018-04-23 PROCEDURE — 74011250636 HC RX REV CODE- 250/636: Performed by: NURSE PRACTITIONER

## 2018-04-23 PROCEDURE — 74011250637 HC RX REV CODE- 250/637: Performed by: PHYSICAL MEDICINE & REHABILITATION

## 2018-04-23 PROCEDURE — 77030010520

## 2018-04-23 PROCEDURE — 97606 NEG PRS WND THER DME>50 SQCM: CPT

## 2018-04-23 RX ADMIN — ASPIRIN 325 MG: 325 TABLET ORAL at 09:10

## 2018-04-23 RX ADMIN — DRONABINOL 2.5 MG: 2.5 CAPSULE ORAL at 09:10

## 2018-04-23 RX ADMIN — MORPHINE SULFATE 5 MG: 4 INJECTION INTRAVENOUS at 04:53

## 2018-04-23 RX ADMIN — Medication 10 ML: at 06:42

## 2018-04-23 RX ADMIN — Medication 10 ML: at 22:02

## 2018-04-23 RX ADMIN — MORPHINE SULFATE 45 MG: 30 TABLET ORAL at 11:54

## 2018-04-23 RX ADMIN — MORPHINE SULFATE 45 MG: 30 TABLET ORAL at 01:31

## 2018-04-23 RX ADMIN — MORPHINE SULFATE 5 MG: 4 INJECTION INTRAVENOUS at 15:25

## 2018-04-23 RX ADMIN — Medication 10 ML: at 15:25

## 2018-04-23 RX ADMIN — MORPHINE SULFATE 45 MG: 30 TABLET ORAL at 07:45

## 2018-04-23 RX ADMIN — MORPHINE SULFATE 45 MG: 30 TABLET ORAL at 15:22

## 2018-04-23 RX ADMIN — MORPHINE SULFATE 5 MG: 10 INJECTION, SOLUTION INTRAMUSCULAR; INTRAVENOUS at 11:56

## 2018-04-23 RX ADMIN — MORPHINE SULFATE 5 MG: 4 INJECTION INTRAVENOUS at 20:30

## 2018-04-23 RX ADMIN — DRONABINOL 2.5 MG: 2.5 CAPSULE ORAL at 18:52

## 2018-04-23 RX ADMIN — MORPHINE SULFATE 100 MG: 100 TABLET, FILM COATED, EXTENDED RELEASE ORAL at 16:21

## 2018-04-23 RX ADMIN — MORPHINE SULFATE 45 MG: 30 TABLET ORAL at 22:00

## 2018-04-23 RX ADMIN — Medication 10 ML: at 09:12

## 2018-04-23 RX ADMIN — MORPHINE SULFATE 5 MG: 4 INJECTION INTRAVENOUS at 09:27

## 2018-04-23 RX ADMIN — PROCHLORPERAZINE EDISYLATE 5 MG: 5 INJECTION INTRAMUSCULAR; INTRAVENOUS at 13:42

## 2018-04-23 RX ADMIN — MORPHINE SULFATE 45 MG: 30 TABLET ORAL at 03:46

## 2018-04-23 RX ADMIN — MORPHINE SULFATE 100 MG: 100 TABLET, FILM COATED, EXTENDED RELEASE ORAL at 08:00

## 2018-04-23 RX ADMIN — PROCHLORPERAZINE EDISYLATE 5 MG: 5 INJECTION INTRAMUSCULAR; INTRAVENOUS at 01:24

## 2018-04-23 RX ADMIN — MORPHINE SULFATE 115 MG: 15 TABLET, EXTENDED RELEASE ORAL at 23:17

## 2018-04-23 RX ADMIN — ONDANSETRON HYDROCHLORIDE 4 MG: 2 INJECTION INTRAMUSCULAR; INTRAVENOUS at 06:41

## 2018-04-23 RX ADMIN — Medication 10 ML: at 15:24

## 2018-04-23 RX ADMIN — ENOXAPARIN SODIUM 40 MG: 100 INJECTION SUBCUTANEOUS at 09:11

## 2018-04-23 RX ADMIN — MORPHINE SULFATE 45 MG: 30 TABLET ORAL at 18:52

## 2018-04-23 RX ADMIN — SODIUM CHLORIDE, SODIUM LACTATE, POTASSIUM CHLORIDE, AND CALCIUM CHLORIDE 100 ML/HR: 600; 310; 30; 20 INJECTION, SOLUTION INTRAVENOUS at 04:30

## 2018-04-23 RX ADMIN — SODIUM CHLORIDE, SODIUM LACTATE, POTASSIUM CHLORIDE, AND CALCIUM CHLORIDE 100 ML/HR: 600; 310; 30; 20 INJECTION, SOLUTION INTRAVENOUS at 16:17

## 2018-04-23 NOTE — PROGRESS NOTES
[facility] relayed that the patient's insurance company has requested updated PT/OT notes. Updated assessments were completed and Vandana Damon liaison from [address] [place] [address] was notified ([id number]#[phone]). Patient will need to be weaned off of IV morphine prior to transfer to [address] [place] [address] or home. PA with the general surgery group was notified of this request.  Care Management will continue to follow her disposition.    ROBERT Lemus

## 2018-04-23 NOTE — WOUND CARE
WOCN Note:     Follow-up visit for [facility] dressing change and fistula pouching.      Chart shows:  Admitted for fistula takdown 3/7/18 by Dr. Mu Heck with [facility] placement in [address] [place] [address]; history of multiple abdominal surgeries and Crohns disease. Admitted from home. Mother able to provide assistance in pouching prior to admission and became very competent in doing so.       Assessment:   Patient is A&O x 4, continent and mobile - moves around room with assistance & has been working with PT. Urbano Palm  Bed: total care bariatric  Pre-medicated by RNPetr Signs  1. Midline abdominal surgical wound = 15 x 13 x 2.8 cm (little change with VAC lost over the weekend)  80% pink granular wound base and 20% stomatized mucus membrane centrally with peristalsis. Periwound with slight redness - Marathon applied. Red rubber cath in stoma for feedings and secured to abdomen. 1 piece of black sponge with central hole to allow for pouching of the fistulas; 2 Hawk's rings placed around hole top and bottom to achieve seal, paste added.  After seal achieved, a convex pouch was placed to collect fistula output. 50 mmHg continuous suction. Colostomy: stoma is moist & pink in LLQ; peristomal skin with mucosal transplantation noted; mucoid tan output; 2-piece flat pouch applied.       Wound Recommendations:    Continue NPWT as described above if she is discharged to [address] [place] [address] but use just pouching if she is discharged home (no NPWT if going home). While admitted, maintain VAC as ordered @ 50mmHg continuous suction using black sponge and change three times weekly.       Skin Care & Pressure Relief Recommendations:  Minimize layers of linen/pads under patient to optimize support surface. Turn/reposition approximately every 2 hours and offload heels. Manage incontinence / promote continence    Discussed above plan with patient & RN, Alicia Mas.     Transition of Care: Plan to follow as needed while admitted to hospital with [facility] dressing changes on Mondays, Wednesdays, & Fridays. SONY Shipley, RN, [facility]  Certified Wound, Ostomy, Continence Nurse  office [phone]  pager [phone] or call  to page    9781: return visit for leak - full dressing change as above. Will continue to follow.    KATIE ShipleyOCON

## 2018-04-23 NOTE — PROGRESS NOTES
Problem: Mobility Impaired (Adult and Pediatric)  Goal: *Acute Goals and Plan of Care (Insert Text)  Physical Therapy Goals  Update 4/18/2018  1. Patient will move from supine to sit and sit to supine  in bed with minimal assistance  within 7 day(s). 2.  Patient will transfer from bed to chair and chair to bed with minimal assistance  using the least restrictive device within 7 day(s). 3.  Patient will perform sit to stand with SBA within 7 days  4. Patient will ambulate 150' with SBA x 1 using LRAD within 7 days. Update 4/6/2018  1. Patient will move from supine to sit and sit to supine  in bed with moderate assistance  within 7 day(s). 2.  Patient will transfer from bed to chair and chair to bed with moderate assistance  using the least restrictive device within 7 day(s). 3.  Patient will perform sit to stand with CGA within 7 days  4. Patient will ambulate 22' with moderate assist x 1 using LRAD within 7 days. Revised 3/27/2018  1. Patient will move from supine to sit and sit to supine  in bed with moderate assistance  within 7 day(s). 2.  Patient will transfer from bed to chair and chair to bed with moderate assistance  using the least restrictive device within 7 day(s). 3.  Patient will perform sit to stand with moderate assistance  within 7 day(s). 4.  Patient will ambulate 100' with moderate assist x1 using the least restrictive device within 7 day(s). Revisited 3/19/2018, goals remain appropriate, carry over    Physical Therapy Goals  Initiated 3/8/2018  1. Patient will move from supine to sit and sit to supine  in bed with moderate assistance  within 7 day(s). 2.  Patient will transfer from bed to chair and chair to bed with moderate assistance  using the least restrictive device within 7 day(s). 3.  Patient will perform sit to stand with moderate assistance  within 7 day(s). 4.  PT will assess gait with the least restrictive device within 7 day(s).            physical Therapy TREATMENT  Patient: Billy Chandler (38 y.o. female)  Date: 4/23/2018  Diagnosis: FISTULA  Small bowel fistula  INTEROCUTANOUS FISTULA <principal problem not specified>  Procedure(s) (LRB):  OPEN JEJUNOSTOMY TUBE PLACEMENT UNDER FLURO/ WOUND VAC CHANGE. (N/A) 11 Days Post-Op  Precautions: Fall, Skin, Contact  Chart, physical therapy assessment, plan of care and goals were reviewed. ASSESSMENT:  Pt is progressing slowly in last few sessions due to onset of severe low back pain with mobility, pt c/o onset today when she sat forward in bed that was placed in chair position, with extra time pt was able to progress to standing which again increased her pain, tolerated ambulation with a rolling walker x 25 feet with CGA and 2nd person to assist with IV, drains, wound vac, and safety, pt agreeable to sit up in chair at end of session to have her bed changed, required minimal assistance to lift BLE onto the foot plates and able to scoot back in chair with supervision, pt would benefit from Orthopedic consult due to her severe  back pain, recommend inpatient rehab to maximize strength, endurance, activity tolerance, and safe, functional mobility  Progression toward goals:  []    Improving appropriately and progressing toward goals  [x]    Improving slowly and progressing toward goals  []    Not making progress toward goals and plan of care will be adjusted     PLAN:  Patient continues to benefit from skilled intervention to address the above impairments. Continue treatment per established plan of care. Discharge Recommendations:  Inpatient Rehab  Further Equipment Recommendations for Discharge: To be determined      SUBJECTIVE:   Patient stated My back is hurting(tearful).     OBJECTIVE DATA SUMMARY:   Critical Behavior:  Neurologic State: Alert  Orientation Level: Oriented X4  Cognition: Appropriate decision making, Appropriate for age attention/concentration, Appropriate safety awareness, Follows commands  Safety/Judgement: Awareness of environment, Insight into deficits, Fall prevention, Good awareness of safety precautions  Functional Mobility Training:  Bed Mobility:   Pt requested to place bed in chair position as she has done previously, pt c/o severe low back pain upon sitting forward without back support, required additional time to allow her pain to decrease                  Transfers:  Sit to Stand: Contact guard assistance; Additional time, stood in place to allow her back pain to decrease  Stand to Sit: Minimum assistance;Assist x1, required assistance to lift BLE onto the foot rest before she was able to scoot back in wheelchair                             Balance:  Sitting: Intact  Sitting - Static: Good (unsupported)  Dynamic: not assessed   Standing: Impaired  Standing - Static: Good  Standing - Dynamic : Fair  Ambulation/Gait Training:  Distance (ft): 25 Feet (ft)  Assistive Device: Gait belt;Walker, rolling  Ambulation - Level of Assistance: Contact guard assistance        Gait Abnormalities: Antalgic;Decreased step clearance;Trunk sway increased        Base of Support: Widened     Speed/Liz: Pace decreased (<100 feet/min)  Step Length: Left shortened;Right shortened                Pain:  Pain Scale 1: Numeric (0 - 10)  Pain Intensity 1: 8  Pain Location 1: Abdomen, low back        Pain Intervention(s) 1: Medication (see MAR)  Activity Tolerance:   Fair tolerance, ambulating short distances only due to severe back pain    After treatment:   [x]    Patient left in no apparent distress sitting up in chair, PCT present to change bed linens while pt sat in chair  []    Patient left in no apparent distress in bed  [x]    Call bell left within reach  [x]    Nursing notified  []    Caregiver present  []    Bed alarm activated    COMMUNICATION/COLLABORATION:   The patients plan of care was discussed with: Registered Nurse    Bhumika Putnam   Time Calculation: 25 mins

## 2018-04-23 NOTE — PROGRESS NOTES
Bedside and Verbal shift change report given to Patt RN (oncoming nurse) by Rody Kong RN (offgoing nurse). Report included the following information SBAR, Kardex, Intake/Output, MAR, Accordion, Recent Results and Med Rec Status.

## 2018-04-23 NOTE — PROGRESS NOTES
Bedside shift change report given to Neville Flynn RN (oncoming nurse) by Deandre Gamino RN (offgoing nurse). Report included the following information SBAR, Intake/Output and MAR.

## 2018-04-23 NOTE — PROGRESS NOTES
Progress Note    Patient: Tony Ferraro MRN: [id number]  SSN: [id number]    YOB: 1975  Age: 38 y.o. Sex: female      Admit Date: 3/7/2018    10 Days Post-Op    Procedure:  Procedure(s):  OPEN JEJUNOSTOMY TUBE PLACEMENT UNDER FLURO/ WOUND VAC CHANGE. Subjective:     No acute surgical issues. Pt noted to have leakage during tubefeeding. Pain is under control.       Objective:     Visit Vitals    /70    Pulse (!) 115    Temp 98.6 °F (37 °C)    Resp 18    Ht 5' 4\" (1.626 m)    Wt 319 lb (144.7 kg)    SpO2 98%    BMI 54.76 kg/m2       Temp (24hrs), Av.6 °F (37 °C), Min:97.9 °F (36.6 °C), Max:99.1 °F (37.3 °C)        Physical Exam:    Gen:  NAD  Pulm:  Unlabored  Abd:  S/ND/appropriate TTP  Wound:  Midline wound with granulating tissue and EC fistula    Recent Results (from the past 24 hour(s))   GLUCOSE, POC    Collection Time: 18  7:13 AM   Result Value Ref Range    Glucose (POC) 86 65 - 100 mg/dL    Performed by Inez Vitale    GLUCOSE, POC    Collection Time: 18  7:27 PM   Result Value Ref Range    Glucose (POC) 82 65 - 100 mg/dL    Performed by Christopher Bains          Assessment:     Hospital Problems  Date Reviewed: 10/27/2017          Codes Class Noted POA    Small bowel fistula ICD-10-CM: K63.2  ICD-9-CM: 569.81  3/7/2018 Unknown              Plan/Recommendations/Medical Decision Making:     - Resume trickle tube feeding  - Oral diet as tolerated  - Pain control  - Local wound care    Signed By: Brennen White MD     2018

## 2018-04-23 NOTE — PROGRESS NOTES
Problem: Self Care Deficits Care Plan (Adult)  Goal: *Acute Goals and Plan of Care (Insert Text)  Occupational Therapy Goals  Reevaluated 4/20/2018  1. Patient will complete grooming activity sitting EOB without support for 10 minutes within 7 days  2. Patient will complete BSC transfer with min A x 1 persons within 7 days. 3. Patient will complete lower body dressing activities with supervision within 7 days using AE  4. Patient will perform standing for clothing management tasks (toileting or dressing) with min A x 1 persons within 7 days. 5. Patient will complete UE exercise program independently within 7 days. Reevaluated 4/10/2018  1. Patient will complete grooming activity sitting EOB without support for 10 minutes within 7 days. 2.  Patient will complete BSC transfer with min A x 2 persons within 7 days. 3.  Patient will complete lower body dressing activities with min A within 7 days. 4.  Patient will perform standing for clothing management tasks (toileting or dressing) with min A x 2 persons within 7 days. 5.  Patient will complete UE exercise program independently within 7 days. Initiated 4/4/2018  1. Patient will perform rolling and pulling self up to head of bed with min assist to assist with ADL's at bed level within 7 day(s). 2.  Patient will perform static sitting edge of bed > or = 10 minutes with supervision/set-up within 7 day(s). 3.  Patient will perform static standing with bilateral UE support on rolling walker > or = 3 minutes with minimal assistance/contact guard assist x's 2 within 7 day(s). 4.  Patient will perform toilet transfers with moderate assistance x's 2 to bedside commode within 7 days. 5.  Patient will perform bilateral UE AROM and strengthening exercises throughout day and grade exercises prn to increase demand within 7 day(s). Occupational Therapy TREATMENT  Patient:  Billy Chandler (38 y.o. female)  Date: 4/23/2018  Diagnosis: FISTULA  Small bowel fistula  INTEROCUTANOUS FISTULA <principal problem not specified>  Procedure(s) (LRB):  OPEN JEJUNOSTOMY TUBE PLACEMENT UNDER FLURO/ WOUND VAC CHANGE. (N/A) 11 Days Post-Op  Precautions: Fall, Skin, Contact  Chart, occupational therapy assessment, plan of care, and goals were reviewed. ASSESSMENT:  Patient received in bed willing to work, B UE AROM HEP as well as train in understanding of 9 health benefits to consistent participation in 30 cumulative minutes completed on a daily basis as lifetime habit. Trained her how to use timer on cell phone to track progress to meeting the 30 cumulative minute goal on a daily basis. Able to demonstrate safe use theraband HEP B UEs. Patient reports feeling stronger and very motivated to participate in inpatient rehab, Encouraged consistent use of [employer] [address] at bedside with assist x 2 persons to facilitate readiness for inpatient therapy as well as to decrease fear of falling during toileting task. Progression toward goals:  []       Improving appropriately and progressing toward goals  [x]       Improving slowly and progressing toward goals  []       Not making progress toward goals and plan of care will be adjusted     PLAN:  Patient continues to benefit from skilled intervention to address the above impairments. Continue treatment per established plan of care. Discharge Recommendations:  Inpatient Rehab  Further Equipment Recommendations for Discharge:  [address]     SUBJECTIVE:   Patient stated I know I feel more safe with two people helping with transfers.  (team notified of recommendation to bring tech to tx session)    OBJECTIVE DATA SUMMARY:   Cognitive/Behavioral Status:  Neurologic State: Alert  Orientation Level: Oriented X4  Cognition: Appropriate decision making; Appropriate for age attention/concentration; Appropriate safety awareness; Follows commands  Perception: Appears intact  Perseveration: No perseveration noted  Safety/Judgement: Awareness of environment; Insight into deficits; Fall prevention;Good awareness of safety precautions    Functional Mobility and Transfers for ADLs:  Bed Mobility: Max A using trendelenberg to reposition in bed       Transfers:     Functional Transfers  Toilet Transfer :  (recommend [address] near bed for rail assist & knee block)  Cues:  (Patient says she will try BSC with A x 2)              ADL Intervention:       Grooming  Washing Hands:  (reviewed hand hygiene post draining of ostomy bag with demo )                        Toileting  Toileting Assistance: Total assistance(dependent) (reviewed toileting plan as per chart: wants 2 person assist )  Bowel Hygiene:  (managing her ostomy bags Mod I)  Cues:  (reports she has not yet tried recommendations)  Adaptive Equipment:  (BSC; may benefit from Toilet tong)    Cognitive Retraining  Attention to Task: Single task  Maintains Attention For (Time): Greater than 10 minutes  Following Commands: Follows two step commands/directions  Safety/Judgement: Awareness of environment; Insight into deficits; Fall prevention;Good awareness of safety precautions               Therapeutic Exercises:   See above, B UEs seated with head of bed up with focus on need for consistency and goal of 30 cumulative minutes daily, outside therapy sessions (She gives herself a \"grade of B- or C,\" for UE HEP over the weekend)  Pain:  Pain Scale 1: Numeric (0 - 10)  Pain Intensity 1: 6  Pain Location 1: Abdomen        Pain Intervention(s) 1: Medication (see MAR)  Activity Tolerance:   Improving; presents as very motivated for inpatient rehab; able to verbalize understanding of need for tolerance of 3 hours therapy and awareness of how HEP can facilitate rediness for participation  Please refer to the flowsheet for vital signs taken during this treatment.   After treatment:   [] Patient left in no apparent distress sitting up in chair  [x] Patient left in no apparent distress in bed  [x] Call bell left within reach  [] Nursing notified  [] Caregiver present  [] Bed alarm activated    COMMUNICATION/COLLABORATION:   The patients plan of care was discussed with: Registered Nurse    Erin Blizzard OTR/L  Time Calculation: 28 mins

## 2018-04-23 NOTE — PROGRESS NOTES
Progress Note    Patient: Khadra Jason MRN: [id number]  SSN: [id number]    YOB: 1975  Age: 38 y.o. Sex: female      Admit Date: 3/7/2018    11 Days Post-Op    Procedure:  Procedure(s):  OPEN JEJUNOSTOMY TUBE PLACEMENT UNDER FLURO/ WOUND VAC CHANGE. Subjective:     No acute surgical issues. Pt doing well. Wound vac changed this am.  Wound vac with good seal.   Pain is under control.   Urine output is improving    Objective:     Visit Vitals    /60 (BP 1 Location: Left arm, BP Patient Position: At rest)    Pulse (!) 109    Temp 98.9 °F (37.2 °C)    Resp 18    Ht 5' 4\" (1.626 m)    Wt 319 lb (144.7 kg)    SpO2 96%    BMI 54.76 kg/m2       Temp (24hrs), Av.6 °F (37 °C), Min:98.4 °F (36.9 °C), Max:98.9 °F (37.2 °C)        Physical Exam:    Gen:  NAD  Pulm:  Unlabored  Abd:  S/ND/appropriate TTP  Wound:  Midline wound with granulating tissue and EC fistula    Recent Results (from the past 24 hour(s))   GLUCOSE, POC    Collection Time: 18  7:27 PM   Result Value Ref Range    Glucose (POC) 82 65 - 100 mg/dL    Performed by Jasper DEGROOT    CBC W/O DIFF    Collection Time: 18  6:20 AM   Result Value Ref Range    WBC 9.8 3.6 - 11.0 K/uL    RBC 2.97 (L) 3.80 - 5.20 M/uL    HGB 8.8 (L) 11.5 - 16.0 g/dL    HCT 28.1 (L) 35.0 - 47.0 %    MCV 94.6 80.0 - 99.0 FL    MCH 29.6 26.0 - 34.0 PG    MCHC 31.3 30.0 - 36.5 g/dL    RDW 17.7 (H) 11.5 - 14.5 %    PLATELET 049 573 - 927 K/uL    MPV 9.2 8.9 - 12.9 FL    NRBC 0.0 0  WBC    ABSOLUTE NRBC 0.00 0.00 - 7.80 K/uL   METABOLIC PANEL, BASIC    Collection Time: 18  6:20 AM   Result Value Ref Range    Sodium 135 (L) 136 - 145 mmol/L    Potassium 3.6 3.5 - 5.1 mmol/L    Chloride 103 97 - 108 mmol/L    CO2 25 21 - 32 mmol/L    Anion gap 7 5 - 15 mmol/L    Glucose 91 65 - 100 mg/dL    BUN 6 6 - 20 MG/DL    Creatinine 0.71 0.55 - 1.02 MG/DL    BUN/Creatinine ratio 8 (L) 12 - 20      GFR est AA >60 >60 ml/min/1.73m2    GFR est non-AA >60 >60 ml/min/1.73m2    Calcium 8.8 8.5 - 10.1 MG/DL   MAGNESIUM    Collection Time: 04/23/18  6:20 AM   Result Value Ref Range    Magnesium 1.6 1.6 - 2.4 mg/dL   PHOSPHORUS    Collection Time: 04/23/18  6:20 AM   Result Value Ref Range    Phosphorus 4.4 2.6 - 4.7 MG/DL   GLUCOSE, POC    Collection Time: 04/23/18  7:11 AM   Result Value Ref Range    Glucose (POC) 98 65 - 100 mg/dL    Performed by Marquis Cervantes, POC    Collection Time: 04/23/18 11:15 AM   Result Value Ref Range    Glucose (POC) 113 (H) 65 - 100 mg/dL    Performed by Maureen Wharton          Assessment:     Hospital Problems  Date Reviewed: 10/27/2017          Codes Class Noted POA    Small bowel fistula ICD-10-CM: K63.2  ICD-9-CM: 569.81  3/7/2018 Unknown              Plan/Recommendations/Medical Decision Making:     - Resume trickle tube feeding  - Oral diet as tolerated  - Pain control  - Local wound care  - Upper GI with small bowel follow through tomorrow through red rubber feeding tube    Signed By: Rosalinda Haley MD     April 23, 2018

## 2018-04-23 NOTE — PROGRESS NOTES
Patient's suctioning on her abdominal wound care began to really leak. I redid the dressing to suctioning to last until wound care comes on Monday 4/23/18.

## 2018-04-24 ENCOUNTER — APPOINTMENT (OUTPATIENT)
Dept: GENERAL RADIOLOGY | Age: 43
DRG: 330 | End: 2018-04-24
Attending: SURGERY
Payer: MEDICARE

## 2018-04-24 LAB
GLUCOSE BLD STRIP.AUTO-MCNC: 95 MG/DL (ref 65–100)
SERVICE CMNT-IMP: NORMAL

## 2018-04-24 PROCEDURE — 74011250636 HC RX REV CODE- 250/636: Performed by: PHYSICIAN ASSISTANT

## 2018-04-24 PROCEDURE — 3331090001 HH PPS REVENUE CREDIT

## 2018-04-24 PROCEDURE — 74011250637 HC RX REV CODE- 250/637: Performed by: PHYSICIAN ASSISTANT

## 2018-04-24 PROCEDURE — 82962 GLUCOSE BLOOD TEST: CPT

## 2018-04-24 PROCEDURE — 77030010520

## 2018-04-24 PROCEDURE — 74011250636 HC RX REV CODE- 250/636: Performed by: NURSE PRACTITIONER

## 2018-04-24 PROCEDURE — 97535 SELF CARE MNGMENT TRAINING: CPT

## 2018-04-24 PROCEDURE — 74011250637 HC RX REV CODE- 250/637: Performed by: INTERNAL MEDICINE

## 2018-04-24 PROCEDURE — 3331090002 HH PPS REVENUE DEBIT

## 2018-04-24 PROCEDURE — 77030010541

## 2018-04-24 PROCEDURE — 77030019952 HC CANSTR VAC ASST KCON -B

## 2018-04-24 PROCEDURE — 74245 XR UPPER GI/SMALL BOWEL: CPT

## 2018-04-24 PROCEDURE — 74011250636 HC RX REV CODE- 250/636: Performed by: SURGERY

## 2018-04-24 PROCEDURE — 97110 THERAPEUTIC EXERCISES: CPT

## 2018-04-24 PROCEDURE — 74011250637 HC RX REV CODE- 250/637: Performed by: NURSE PRACTITIONER

## 2018-04-24 PROCEDURE — 97165 OT EVAL LOW COMPLEX 30 MIN: CPT

## 2018-04-24 PROCEDURE — 65270000029 HC RM PRIVATE

## 2018-04-24 PROCEDURE — 74011636320 HC RX REV CODE- 636/320

## 2018-04-24 PROCEDURE — 77030010522

## 2018-04-24 PROCEDURE — 77030018717 HC DRSG GRNUFM KCON -B

## 2018-04-24 PROCEDURE — 74011250637 HC RX REV CODE- 250/637: Performed by: PHYSICAL MEDICINE & REHABILITATION

## 2018-04-24 RX ORDER — MORPHINE SULFATE 4 MG/ML
3 INJECTION INTRAVENOUS EVERY 24 HOURS
Status: DISCONTINUED | OUTPATIENT
Start: 2018-04-24 | End: 2018-04-25

## 2018-04-24 RX ORDER — MORPHINE SULFATE 10 MG/ML
3 INJECTION, SOLUTION INTRAMUSCULAR; INTRAVENOUS EVERY 24 HOURS
Status: DISCONTINUED | OUTPATIENT
Start: 2018-04-24 | End: 2018-04-24 | Stop reason: CLARIF

## 2018-04-24 RX ADMIN — IOHEXOL 250 ML: 240 INJECTION, SOLUTION INTRATHECAL; INTRAVASCULAR; INTRAVENOUS; ORAL at 10:00

## 2018-04-24 RX ADMIN — MORPHINE SULFATE 100 MG: 100 TABLET, FILM COATED, EXTENDED RELEASE ORAL at 15:51

## 2018-04-24 RX ADMIN — Medication 10 ML: at 13:09

## 2018-04-24 RX ADMIN — MORPHINE SULFATE 45 MG: 30 TABLET ORAL at 15:08

## 2018-04-24 RX ADMIN — DRONABINOL 2.5 MG: 2.5 CAPSULE ORAL at 18:17

## 2018-04-24 RX ADMIN — Medication 10 ML: at 22:48

## 2018-04-24 RX ADMIN — MORPHINE SULFATE 100 MG: 100 TABLET, FILM COATED, EXTENDED RELEASE ORAL at 08:34

## 2018-04-24 RX ADMIN — DRONABINOL 2.5 MG: 2.5 CAPSULE ORAL at 10:46

## 2018-04-24 RX ADMIN — MORPHINE SULFATE 3 MG: 4 INJECTION INTRAVENOUS at 13:09

## 2018-04-24 RX ADMIN — MORPHINE SULFATE 45 MG: 30 TABLET ORAL at 10:45

## 2018-04-24 RX ADMIN — ASPIRIN 325 MG: 325 TABLET ORAL at 10:45

## 2018-04-24 RX ADMIN — MORPHINE SULFATE 45 MG: 30 TABLET ORAL at 18:17

## 2018-04-24 RX ADMIN — MORPHINE SULFATE 45 MG: 30 TABLET ORAL at 03:59

## 2018-04-24 RX ADMIN — PROCHLORPERAZINE EDISYLATE 5 MG: 5 INJECTION INTRAMUSCULAR; INTRAVENOUS at 06:52

## 2018-04-24 RX ADMIN — MORPHINE SULFATE 45 MG: 30 TABLET ORAL at 12:57

## 2018-04-24 RX ADMIN — Medication 10 ML: at 19:30

## 2018-04-24 RX ADMIN — ONDANSETRON HYDROCHLORIDE 4 MG: 2 INJECTION INTRAMUSCULAR; INTRAVENOUS at 15:17

## 2018-04-24 RX ADMIN — ENOXAPARIN SODIUM 40 MG: 100 INJECTION SUBCUTANEOUS at 10:48

## 2018-04-24 RX ADMIN — MORPHINE SULFATE 45 MG: 30 TABLET ORAL at 06:51

## 2018-04-24 RX ADMIN — MORPHINE SULFATE 5 MG: 4 INJECTION INTRAVENOUS at 19:29

## 2018-04-24 RX ADMIN — MORPHINE SULFATE 5 MG: 4 INJECTION INTRAVENOUS at 04:05

## 2018-04-24 RX ADMIN — MORPHINE SULFATE 45 MG: 30 TABLET ORAL at 20:57

## 2018-04-24 RX ADMIN — SODIUM CHLORIDE, SODIUM LACTATE, POTASSIUM CHLORIDE, AND CALCIUM CHLORIDE 100 ML/HR: 600; 310; 30; 20 INJECTION, SOLUTION INTRAVENOUS at 01:54

## 2018-04-24 RX ADMIN — LORAZEPAM 1 MG: 2 INJECTION INTRAMUSCULAR; INTRAVENOUS at 01:59

## 2018-04-24 RX ADMIN — Medication 10 ML: at 10:47

## 2018-04-24 RX ADMIN — MORPHINE SULFATE 45 MG: 30 TABLET ORAL at 00:13

## 2018-04-24 RX ADMIN — Medication 10 ML: at 07:02

## 2018-04-24 RX ADMIN — PROCHLORPERAZINE EDISYLATE 5 MG: 5 INJECTION INTRAMUSCULAR; INTRAVENOUS at 22:48

## 2018-04-24 RX ADMIN — Medication 10 ML: at 10:31

## 2018-04-24 RX ADMIN — SODIUM CHLORIDE, SODIUM LACTATE, POTASSIUM CHLORIDE, AND CALCIUM CHLORIDE 100 ML/HR: 600; 310; 30; 20 INJECTION, SOLUTION INTRAVENOUS at 10:29

## 2018-04-24 RX ADMIN — Medication 10 ML: at 20:59

## 2018-04-24 RX ADMIN — MORPHINE SULFATE 45 MG: 30 TABLET ORAL at 23:57

## 2018-04-24 RX ADMIN — Medication 10 ML: at 07:03

## 2018-04-24 RX ADMIN — MORPHINE SULFATE 115 MG: 15 TABLET, EXTENDED RELEASE ORAL at 23:01

## 2018-04-24 NOTE — WOUND CARE
Trouble shoot VAC dressing. Full dressing change using small black sponge with Hawk's ring to isolate the central fistula which is taking up more and more of the wound bed landscape. Effluent is becoming chunkier and causing VAC to leak since it won't pass through sponge. We will continue to [facility] for short term but if with eating we cannot maintain a seal more than 24 hours, will likely have to move to pouching which is long term plan anyways. 1 piece of black sponge used with 4 Hawk's rings now that the fistula is larger. Stomatized fistula is moist & red with effluent exiting at 4 o'clock at surface level of wound. 2-piece pouch applied after 50 mmHg suction achieved. Will continue to follow.   BRYAN HeathN

## 2018-04-24 NOTE — PROGRESS NOTES
Insurance authorization has been obtained for transfer to [facility] at discharge, but she must be off of IV pain medications for at least 24 hours. The process of weaning off of her IV morphine started today. Care Management will continue to follow her disposition.    ROBERT Shafer

## 2018-04-24 NOTE — PROGRESS NOTES
Problem: Self Care Deficits Care Plan (Adult)  Goal: *Acute Goals and Plan of Care (Insert Text)  Occupational Therapy Goals  Reevaluated 4/20/2018  1. Patient will complete grooming activity sitting EOB without support for 10 minutes within 7 days  2. Patient will complete BSC transfer with min A x 1 persons within 7 days. 3. Patient will complete lower body dressing activities with supervision within 7 days using AE  4. Patient will perform standing for clothing management tasks (toileting or dressing) with min A x 1 persons within 7 days. 5. Patient will complete UE exercise program independently within 7 days. Reevaluated 4/10/2018  1. Patient will complete grooming activity sitting EOB without support for 10 minutes within 7 days. 2.  Patient will complete BSC transfer with min A x 2 persons within 7 days. 3.  Patient will complete lower body dressing activities with min A within 7 days. 4.  Patient will perform standing for clothing management tasks (toileting or dressing) with min A x 2 persons within 7 days. 5.  Patient will complete UE exercise program independently within 7 days. Initiated 4/4/2018  1. Patient will perform rolling and pulling self up to head of bed with min assist to assist with ADL's at bed level within 7 day(s). 2.  Patient will perform static sitting edge of bed > or = 10 minutes with supervision/set-up within 7 day(s). 3.  Patient will perform static standing with bilateral UE support on rolling walker > or = 3 minutes with minimal assistance/contact guard assist x's 2 within 7 day(s). 4.  Patient will perform toilet transfers with moderate assistance x's 2 to bedside commode within 7 days. 5.  Patient will perform bilateral UE AROM and strengthening exercises throughout day and grade exercises prn to increase demand within 7 day(s). Occupational Therapy TREATMENT  Patient:  Kymberly Bolaños (38 y.o. female)  Date: 4/24/2018  Diagnosis: FISTULA  Small bowel fistula  INTEROCUTANOUS FISTULA <principal problem not specified>  Procedure(s) (LRB):  OPEN JEJUNOSTOMY TUBE PLACEMENT UNDER FLURO/ WOUND VAC CHANGE. (N/A) 12 Days Post-Op  Precautions: Fall, Skin, Contact  Chart, occupational therapy assessment, plan of care, and goals were reviewed. ASSESSMENT:  In bed, alerting OT of possible leak at wound vac site, noted some drainage/fluid around L area/side of vac but remains enclosed, patient seemed to have anxiety about this and fear of moving around, has declined turn team per patient, encouraged bed mobility and shifting weight until vac area is resolved, continues to have back pain, instructed on heating pad or disposable heating pack to help relieve pain/knot. Agreeable to progress HEP, instructed on AROM exercises along with theraband. Good follow through. Continues to progress, recommend IP rehab  Progression toward goals:  [x]       Improving appropriately and progressing toward goals  []       Improving slowly and progressing toward goals  []       Not making progress toward goals and plan of care will be adjusted     PLAN:  Patient continues to benefit from skilled intervention to address the above impairments. Continue treatment per established plan of care. Discharge Recommendations:  Inpatient Rehab  Further Equipment Recommendations for Discharge:  TBD at rehab     SUBJECTIVE:   Patient stated [place]le [place] changed this 2x yesterday and it is already leaking. Paulo Lennon    OBJECTIVE DATA SUMMARY:   Cognitive/Behavioral Status:  Neurologic State: Alert                   Functional Mobility and Transfers for ADLs:  Bed Mobility:       Transfers:             Balance:       ADL Intervention:  Feeding  Feeding Assistance: Independent  Container Management: Independent  Cutting Food: Independent  Utensil Management: Independent  Food to Mouth: Independent  Drink to Mouth:  Independent                                       Neuro Re-Education:           Therapeutic Exercises: AROM B shoulder flexion, forward punches in 5 different planes, 2 minute intervals. Reviewed HEP theraband, progressing to 3 10 minute intervals of exercises. Melo Mcgill to show therapist of carry over. Pain:  Pain Scale 1: Numeric (0 - 10)  Pain Intensity 1: 6  Pain Location 1: Abdomen     Pain Description 1: Aching;Constant  Pain Intervention(s) 1: Medication (see MAR)  Activity Tolerance:   good  Please refer to the flowsheet for vital signs taken during this treatment.   After treatment:   [] Patient left in no apparent distress sitting up in chair  [x] Patient left in no apparent distress in bed  [x] Call bell left within reach  [x] Nursing notified  [] Caregiver present  [] Bed alarm activated    COMMUNICATION/COLLABORATION:   The patients plan of care was discussed with: Registered Nurse    Grace Hammonds OT  Time Calculation: 16 mins

## 2018-04-24 NOTE — PROGRESS NOTES
Bedside shift change report given to Sarah (oncoming nurse) by Janel Franklin (offgoing nurse). Report included the following information SBAR.

## 2018-04-24 NOTE — PROGRESS NOTES
NUTRITION       Recommendations:  1. Increase tube feeding towards goal as appropriate per surgery (if unable to advance tube feedings, unfortunately, may need to consider resumption of TPN)    2. Continue daily weights (standing preferred)    3. Continue strict documentation of all ileostomy and fistula output in I/O's    Brief note. Chart reviewed, discussed with RN. Events over the weekend noted. Pt was able to reach goal of 60 mL/hr, then 72 mL/hr x 20 hours to allow for 4 hours off the pump; however, her wound vac leaked and tube feedings were held, then resumed at 20 mL/hr. She also had pain with prosource (only when plunged), but she is agreeable to resumption of 2 packets per day (one at a time). Per pt (she spoke with surgeon at the bedside after small bowel series), series indicated down flow from J-tube as well as back flow into the fistula bag. Unclear if tube feedings will be increased at this time; however, she has been 5 days without adequate feedings. Feedings have been Peptamen 1.5 @ 20 mL/hr + 50 mL flush q 4 hours + 1 pkt liquid Prosource since 4/21. This provides 780 kcal, 47 g protein and 700 mL total free water (tf + flush)-- meeting 47% and 39% of estimated kcal and protein needs, respectively. This does not adequately meet her needs. IVF is providing 2400 mL additional fluids per day. Pt's goal tube feeding is: Peptamen 1.5 @ 60 ml/hr + 1 pkt liquid Prosource BID + 100 mL flush q 3 hours. This would provide 2280 kcal, 128 g protein and 1970 mL total free water (tf + flush). If unable to resume enteral feeding at goal, will need to consider resumption of TPN to prevent nutrition decline.     Fistula output:  450 mL so far today  4725 mL out 4/23  1450 mL 4/22    Ileostomy output:  50 mL so far today  None recorded 4/23    Estimated Nutrition Needs:   Kcals/day: 1649 Kcals/day (1306-9433 kcal/day (11-15 kcal/kg))  Protein: 120 g (120-150 kcal/day (0.8-1 g/kg))  Fluid: 2250 ml (or 1 mL/kcal or per output)     Based On: Kcal/kg - specify (Comment)  Weight Used: Actual wt (149.9 kg)    RD to follow.     [address]

## 2018-04-24 NOTE — PROGRESS NOTES
Bedside shift change report given to Rosa Daniel RN (oncoming nurse) by Frantz Johnston RN (offgoing nurse). Report included the following information SBAR, Kardex, Procedure Summary, Intake/Output, MAR and Recent Results.

## 2018-04-24 NOTE — PROGRESS NOTES
General Surgery Daily Progress Note    Admit Date: 3/7/2018  Post-Operative Day: 12 Days Post-Op from Procedure(s):  OPEN JEJUNOSTOMY TUBE PLACEMENT UNDER FLURO/ WOUND VAC CHANGE. Subjective:     Last 24 hrs: Patient just returned from Radiology for small bowel series--results pending. Tolerating trickle tube feeds & mech soft diet & fluids without NV. Looking forward to d/c to [address] [place], [place] [address]ab. Will need to be weaned off IV Morphine for 2 days prior to transfer & patient aware of this. Anxious and becomes tearful talking about d/c from hospital.   Planning to ambulate with PT later today. Objective:     Blood pressure 107/67, pulse 96, temperature 98.7 °F (37.1 °C), resp. rate 18, height 5' 4\" (1.626 m), weight 307 lb 11.2 oz (139.6 kg), SpO2 97 %. Temp (24hrs), Av.7 °F (37.1 °C), Min:98.4 °F (36.9 °C), Max:98.9 °F (37.2 °C)      _____________________  Physical Exam:     Alert and Oriented, cooperative, in no acute distress. Cardiovascular: RRR, chronic 2+ peripheral edema  Lungs:CTAB   Abd: soft with mild, diffuse TTP  Wound:  wound vac in place with small amount of leaking on left side  Ostomy patent with watery, tan liquid in appliance      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)            Plan:     Small bowel study results pending. Will reduce IV morphine to 3mg from 5mg iv q24hrs. May resume trickle tube feeds. Ambulation with PT today & daily. DVT proph  Wound care called for daily wound care & vac change if needed. Further treatment & d/c planning per Dr. Gurmeet Spencer. Data Review:    Recent Labs      18   0620   WBC  9.8   HGB  8.8*   HCT  28.1*   PLT  377     Recent Labs      18   0620   NA  135*   K  3.6   CL  103   CO2  25   GLU  91   BUN  6   CREA  0.71   CA  8.8   MG  1.6   PHOS  4.4     No results for input(s): AML, LPSE in the last 72 hours.         ______________________  Medications:    Current Facility-Administered Medications Medication Dose Route Frequency    iohexol (OMNIPAQUE) 300 mg iodine/mL contrast injection 240 mL  240 mL Intrathecal RAD ONCE    lactated Ringers infusion  100 mL/hr IntraVENous CONTINUOUS    morphine injection 5 mg  5 mg IntraVENous Q2H PRN    morphine injection 5 mg  5 mg IntraVENous Q24H    0.9% sodium chloride infusion 250 mL  250 mL IntraVENous PRN    insulin lispro (HUMALOG) injection   SubCUTAneous BID    morphine IR (MS IR) tablet 45 mg  45 mg Oral Q3H    morphine CR (MS CONTIN) tablet 115 mg  115 mg Oral QHS    morphine CR (MS CONTIN) tablet 100 mg  100 mg Oral Q24H    morphine CR (MS CONTIN) tablet 100 mg  100 mg Oral Q24H    aspirin (ASPIRIN) tablet 325 mg  325 mg Oral DAILY    0.9% sodium chloride infusion 250 mL  250 mL IntraVENous PRN    acetaminophen (TYLENOL) tablet 650 mg  650 mg Oral Q6H PRN    diphenhydrAMINE (BENADRYL) capsule 25 mg  25 mg Oral Q6H PRN    enoxaparin (LOVENOX) injection 40 mg  40 mg SubCUTAneous Q24H    nystatin (MYCOSTATIN) 100,000 unit/gram cream   Topical BID    prochlorperazine (COMPAZINE) with saline injection 5 mg  5 mg IntraVENous Q6H PRN    naloxone (NARCAN) injection 0.4 mg  0.4 mg IntraVENous EVERY 2 MINUTES AS NEEDED    glucose chewable tablet 16 g  4 Tab Oral PRN    dextrose (D50W) injection syrg 12.5-25 g  12.5-25 g IntraVENous PRN    glucagon (GLUCAGEN) injection 1 mg  1 mg IntraMUSCular PRN    scopolamine (TRANSDERM-SCOP) 1 mg over 3 days 1 Patch  1 Patch TransDERmal Q72H    sodium chloride (NS) flush 10 mL  10 mL InterCATHeter Q24H    sodium chloride (NS) flush 10 mL  10 mL InterCATHeter Q8H    alteplase (CATHFLO) 1 mg in sterile water (preservative free) 1 mL injection  1 mg InterCATHeter PRN    bacitracin 500 unit/gram packet 1 Packet  1 Packet Topical PRN    sodium chloride (NS) flush 20 mL  20 mL InterCATHeter PRN    sodium chloride (NS) flush 10 mL  10 mL InterCATHeter Q24H    sodium chloride (NS) flush 10 mL  10 mL InterCATHeter PRN    sodium chloride (NS) flush 10 mL  10 mL InterCATHeter Q8H    dronabinol (MARINOL) capsule 2.5 mg  2.5 mg Oral BID    phenol throat spray (CHLORASEPTIC) 1 Spray  1 Spray Oral PRN    ondansetron (ZOFRAN) injection 4 mg  4 mg IntraVENous Q4H PRN    promethazine (PHENERGAN) 12.5 mg in 0.9% sodium chloride 50 mL IVPB  12.5 mg IntraVENous Q6H PRN    LORazepam (ATIVAN) injection 1 mg  1 mg IntraVENous Q6H PRN    albuterol (PROVENTIL VENTOLIN) nebulizer solution 2.5 mg  2.5 mg Nebulization Q4H PRN       Adam Lizarraga PA-C  4/24/2018

## 2018-04-25 LAB
ANION GAP SERPL CALC-SCNC: 8 MMOL/L (ref 5–15)
BUN SERPL-MCNC: 4 MG/DL (ref 6–20)
BUN/CREAT SERPL: 5 (ref 12–20)
CALCIUM SERPL-MCNC: 8.6 MG/DL (ref 8.5–10.1)
CHLORIDE SERPL-SCNC: 100 MMOL/L (ref 97–108)
CO2 SERPL-SCNC: 27 MMOL/L (ref 21–32)
CREAT SERPL-MCNC: 0.83 MG/DL (ref 0.55–1.02)
ERYTHROCYTE [DISTWIDTH] IN BLOOD BY AUTOMATED COUNT: 17.2 % (ref 11.5–14.5)
GLUCOSE SERPL-MCNC: 108 MG/DL (ref 65–100)
HCT VFR BLD AUTO: 28.2 % (ref 35–47)
HGB BLD-MCNC: 8.8 G/DL (ref 11.5–16)
MAGNESIUM SERPL-MCNC: 1.4 MG/DL (ref 1.6–2.4)
MCH RBC QN AUTO: 28.9 PG (ref 26–34)
MCHC RBC AUTO-ENTMCNC: 31.2 G/DL (ref 30–36.5)
MCV RBC AUTO: 92.5 FL (ref 80–99)
NRBC # BLD: 0 K/UL (ref 0–0.01)
NRBC BLD-RTO: 0 PER 100 WBC
PHOSPHATE SERPL-MCNC: 4.3 MG/DL (ref 2.6–4.7)
PLATELET # BLD AUTO: 377 K/UL (ref 150–400)
PMV BLD AUTO: 9.3 FL (ref 8.9–12.9)
POTASSIUM SERPL-SCNC: 3.3 MMOL/L (ref 3.5–5.1)
RBC # BLD AUTO: 3.05 M/UL (ref 3.8–5.2)
SODIUM SERPL-SCNC: 135 MMOL/L (ref 136–145)
WBC # BLD AUTO: 11.4 K/UL (ref 3.6–11)

## 2018-04-25 PROCEDURE — 80048 BASIC METABOLIC PNL TOTAL CA: CPT | Performed by: SURGERY

## 2018-04-25 PROCEDURE — 74011250637 HC RX REV CODE- 250/637: Performed by: PHYSICAL MEDICINE & REHABILITATION

## 2018-04-25 PROCEDURE — 83735 ASSAY OF MAGNESIUM: CPT | Performed by: SURGERY

## 2018-04-25 PROCEDURE — 36415 COLL VENOUS BLD VENIPUNCTURE: CPT | Performed by: SURGERY

## 2018-04-25 PROCEDURE — 85027 COMPLETE CBC AUTOMATED: CPT | Performed by: SURGERY

## 2018-04-25 PROCEDURE — 74011250637 HC RX REV CODE- 250/637: Performed by: NURSE PRACTITIONER

## 2018-04-25 PROCEDURE — 74011250637 HC RX REV CODE- 250/637: Performed by: INTERNAL MEDICINE

## 2018-04-25 PROCEDURE — 74011250636 HC RX REV CODE- 250/636: Performed by: NURSE PRACTITIONER

## 2018-04-25 PROCEDURE — 74011250636 HC RX REV CODE- 250/636: Performed by: PHYSICIAN ASSISTANT

## 2018-04-25 PROCEDURE — 74011250636 HC RX REV CODE- 250/636: Performed by: SURGERY

## 2018-04-25 PROCEDURE — 74011250637 HC RX REV CODE- 250/637: Performed by: PHYSICIAN ASSISTANT

## 2018-04-25 PROCEDURE — 84100 ASSAY OF PHOSPHORUS: CPT | Performed by: SURGERY

## 2018-04-25 PROCEDURE — 65270000029 HC RM PRIVATE

## 2018-04-25 PROCEDURE — 3331090001 HH PPS REVENUE CREDIT

## 2018-04-25 PROCEDURE — 3331090002 HH PPS REVENUE DEBIT

## 2018-04-25 RX ORDER — MORPHINE SULFATE 15 MG/1
30 TABLET ORAL DAILY PRN
Status: DISCONTINUED | OUTPATIENT
Start: 2018-04-25 | End: 2018-05-23 | Stop reason: HOSPADM

## 2018-04-25 RX ORDER — LIDOCAINE 50 MG/G
2 PATCH TOPICAL EVERY 24 HOURS
Status: DISCONTINUED | OUTPATIENT
Start: 2018-04-25 | End: 2018-05-23 | Stop reason: HOSPADM

## 2018-04-25 RX ORDER — MAGNESIUM SULFATE HEPTAHYDRATE 40 MG/ML
2 INJECTION, SOLUTION INTRAVENOUS ONCE
Status: COMPLETED | OUTPATIENT
Start: 2018-04-25 | End: 2018-04-27

## 2018-04-25 RX ORDER — MORPHINE SULFATE 15 MG/1
45 TABLET ORAL EVERY 4 HOURS
Status: DISCONTINUED | OUTPATIENT
Start: 2018-04-25 | End: 2018-05-15

## 2018-04-25 RX ORDER — POTASSIUM CHLORIDE 29.8 MG/ML
20 INJECTION INTRAVENOUS
Status: COMPLETED | OUTPATIENT
Start: 2018-04-25 | End: 2018-04-27

## 2018-04-25 RX ADMIN — NYSTATIN: 100000 CREAM TOPICAL at 18:00

## 2018-04-25 RX ADMIN — Medication 10 ML: at 09:17

## 2018-04-25 RX ADMIN — Medication 10 ML: at 19:40

## 2018-04-25 RX ADMIN — POTASSIUM CHLORIDE 20 MEQ: 400 INJECTION, SOLUTION INTRAVENOUS at 15:19

## 2018-04-25 RX ADMIN — POTASSIUM CHLORIDE 20 MEQ: 400 INJECTION, SOLUTION INTRAVENOUS at 13:14

## 2018-04-25 RX ADMIN — MORPHINE SULFATE 100 MG: 100 TABLET, FILM COATED, EXTENDED RELEASE ORAL at 08:40

## 2018-04-25 RX ADMIN — Medication 10 ML: at 08:40

## 2018-04-25 RX ADMIN — MORPHINE SULFATE 45 MG: 15 TABLET ORAL at 16:54

## 2018-04-25 RX ADMIN — Medication 10 ML: at 09:18

## 2018-04-25 RX ADMIN — POTASSIUM CHLORIDE 20 MEQ: 400 INJECTION, SOLUTION INTRAVENOUS at 19:30

## 2018-04-25 RX ADMIN — MORPHINE SULFATE 45 MG: 15 TABLET ORAL at 21:25

## 2018-04-25 RX ADMIN — ENOXAPARIN SODIUM 40 MG: 100 INJECTION SUBCUTANEOUS at 09:06

## 2018-04-25 RX ADMIN — LORAZEPAM 1 MG: 2 INJECTION INTRAMUSCULAR; INTRAVENOUS at 19:48

## 2018-04-25 RX ADMIN — ASPIRIN 325 MG: 325 TABLET ORAL at 08:48

## 2018-04-25 RX ADMIN — ONDANSETRON HYDROCHLORIDE 4 MG: 2 INJECTION INTRAMUSCULAR; INTRAVENOUS at 09:21

## 2018-04-25 RX ADMIN — Medication 10 ML: at 23:12

## 2018-04-25 RX ADMIN — ONDANSETRON HYDROCHLORIDE 4 MG: 2 INJECTION INTRAMUSCULAR; INTRAVENOUS at 13:58

## 2018-04-25 RX ADMIN — Medication 10 ML: at 13:03

## 2018-04-25 RX ADMIN — MORPHINE SULFATE 100 MG: 100 TABLET, FILM COATED, EXTENDED RELEASE ORAL at 15:19

## 2018-04-25 RX ADMIN — MORPHINE SULFATE 5 MG: 4 INJECTION INTRAVENOUS at 00:56

## 2018-04-25 RX ADMIN — MORPHINE SULFATE 45 MG: 30 TABLET ORAL at 03:16

## 2018-04-25 RX ADMIN — SODIUM CHLORIDE, SODIUM LACTATE, POTASSIUM CHLORIDE, AND CALCIUM CHLORIDE 100 ML/HR: 600; 310; 30; 20 INJECTION, SOLUTION INTRAVENOUS at 16:51

## 2018-04-25 RX ADMIN — MORPHINE SULFATE 45 MG: 15 TABLET ORAL at 13:01

## 2018-04-25 RX ADMIN — MORPHINE SULFATE 45 MG: 30 TABLET ORAL at 08:48

## 2018-04-25 RX ADMIN — DRONABINOL 2.5 MG: 2.5 CAPSULE ORAL at 19:00

## 2018-04-25 RX ADMIN — Medication 10 ML: at 21:31

## 2018-04-25 RX ADMIN — MAGNESIUM SULFATE HEPTAHYDRATE 2 G: 40 INJECTION, SOLUTION INTRAVENOUS at 13:11

## 2018-04-25 RX ADMIN — MORPHINE SULFATE 5 MG: 4 INJECTION INTRAVENOUS at 06:01

## 2018-04-25 RX ADMIN — MORPHINE SULFATE 115 MG: 15 TABLET, EXTENDED RELEASE ORAL at 23:11

## 2018-04-25 RX ADMIN — MORPHINE SULFATE 45 MG: 30 TABLET ORAL at 06:56

## 2018-04-25 RX ADMIN — MAGNESIUM SULFATE HEPTAHYDRATE 2 G: 40 INJECTION, SOLUTION INTRAVENOUS at 21:30

## 2018-04-25 RX ADMIN — DRONABINOL 2.5 MG: 2.5 CAPSULE ORAL at 08:48

## 2018-04-25 NOTE — ROUTINE PROCESS
Bedside and Verbal shift change report given to Benedict Aguilar RN (oncoming nurse) by Jennifer Jett RN (offgoing nurse). Report included the following information SBAR, Kardex, Intake/Output, MAR, Accordion and Recent Results.

## 2018-04-25 NOTE — PROGRESS NOTES
CM follow up. CM spoke with Jennifer Collier/ [facility] [phone], who again confirmed that insurance authorization has been received. Acceptance for admission is still contingent on being free of IV pain medication for 24 hours. Since last dose was last midnight, earliest admission will be 4/26/18.

## 2018-04-25 NOTE — PROGRESS NOTES
Bedside shift change report given to Sirena Schreiber (oncoming nurse) by Anjel Bhatia (offgoing nurse). Report included the following information SBAR. None

## 2018-04-25 NOTE — PROGRESS NOTES
Bedside shift change report given to Sheri Cevallos (oncoming nurse) by Edith Herndon (offgoing nurse). Report included the following information SBAR.

## 2018-04-25 NOTE — PROGRESS NOTES
General Surgery Daily Progress Note    Admit Date: 3/7/2018  Post-Operative Day: 13 Days Post-Op from Procedure(s):  OPEN JEJUNOSTOMY TUBE PLACEMENT UNDER FLURO/ WOUND VAC CHANGE. Subjective:     Last 24 hrs: Pt is having some crampy pain. Now off IV pain meds. Appears to have TF in wound mgr bag and wound vac container. Pt has picture of red rubber that has slipped out about an inch. Objective:     Blood pressure 92/55, pulse (!) 105, temperature 98.5 °F (36.9 °C), resp. rate 20, height 5' 4\" (1.626 m), weight 305 lb 1.9 oz (138.4 kg), SpO2 95 %. Temp (24hrs), Av.8 °F (37.1 °C), Min:98.5 °F (36.9 °C), Max:99.2 °F (37.3 °C)      _____________________  Physical Exam:     Alert and Oriented, x3, in no acute distress. 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         ______________________  Medications:    Current Facility-Administered Medications   Medication Dose Route Frequency    morphine IR (MS IR) tablet 45 mg  45 mg Oral Q4H    morphine IR (MS IR) tablet 30 mg  30 mg Oral DAILY PRN    lidocaine (LIDODERM) 5 % patch 2 Patch  2 Patch TransDERmal Q24H    lactated Ringers infusion  100 mL/hr IntraVENous CONTINUOUS    0.9% sodium chloride infusion 250 mL  250 mL IntraVENous PRN    morphine CR (MS CONTIN) tablet 115 mg  115 mg Oral QHS    morphine CR (MS CONTIN) tablet 100 mg  100 mg Oral Q24H    morphine CR (MS CONTIN) tablet 100 mg  100 mg Oral Q24H    aspirin (ASPIRIN) tablet 325 mg  325 mg Oral DAILY    0.9% sodium chloride infusion 250 mL  250 mL IntraVENous PRN    acetaminophen (TYLENOL) tablet 650 mg  650 mg Oral Q6H PRN    diphenhydrAMINE (BENADRYL) capsule 25 mg  25 mg Oral Q6H PRN    enoxaparin (LOVENOX) injection 40 mg  40 mg SubCUTAneous Q24H    nystatin (MYCOSTATIN) 100,000 unit/gram cream   Topical BID    prochlorperazine (COMPAZINE) with saline injection 5 mg  5 mg IntraVENous Q6H PRN    naloxone (NARCAN) injection 0.4 mg  0.4 mg IntraVENous EVERY 2 MINUTES AS NEEDED    glucose chewable tablet 16 g  4 Tab Oral PRN    dextrose (D50W) injection syrg 12.5-25 g  12.5-25 g IntraVENous PRN    glucagon (GLUCAGEN) injection 1 mg  1 mg IntraMUSCular PRN    scopolamine (TRANSDERM-SCOP) 1 mg over 3 days 1 Patch  1 Patch TransDERmal Q72H    sodium chloride (NS) flush 10 mL  10 mL InterCATHeter Q24H    sodium chloride (NS) flush 10 mL  10 mL InterCATHeter Q8H    alteplase (CATHFLO) 1 mg in sterile water (preservative free) 1 mL injection  1 mg InterCATHeter PRN    bacitracin 500 unit/gram packet 1 Packet  1 Packet Topical PRN    sodium chloride (NS) flush 20 mL  20 mL InterCATHeter PRN    sodium chloride (NS) flush 10 mL  10 mL InterCATHeter Q24H    sodium chloride (NS) flush 10 mL  10 mL InterCATHeter PRN    sodium chloride (NS) flush 10 mL  10 mL InterCATHeter Q8H    dronabinol (MARINOL) capsule 2.5 mg  2.5 mg Oral BID    phenol throat spray (CHLORASEPTIC) 1 Spray  1 Spray Oral PRN    ondansetron (ZOFRAN) injection 4 mg  4 mg IntraVENous Q4H PRN    promethazine (PHENERGAN) 12.5 mg in 0.9% sodium chloride 50 mL IVPB  12.5 mg IntraVENous Q6H PRN    LORazepam (ATIVAN) injection 1 mg  1 mg IntraVENous Q6H PRN    albuterol (PROVENTIL VENTOLIN) nebulizer solution 2.5 mg  2.5 mg Nebulization Q4H PRN       Cat Asif, JODI  4/25/2018      ADDENDUM:  Jo Ann Edwards MD  Pt seen and examined. No acute surgical issues. Pt off IV pain medication. Some leakage around fistula. Continue oral intake. Advance tubefeeding as tolerated. Decrease IV fluid.

## 2018-04-25 NOTE — PROGRESS NOTES
Palliative Medicine Consult  Heath: [phone] ([phone])    Patient Name: Latonia Kirkpatrick  YOB: 1975    Date of Initial Consult: 3/12/18  Reason for Consult: Pain management, chronic and post op   Requesting Provider: Rajni   Primary Care Physician: Chilo Connors MD     SUMMARY:   Latonia Kirkpatrick is a 38 y.o. with a past history of Crohn's disease (followed by Dr Idania Heath), mult surgeries s/p total colectomy w/ end ileostomy, 6/2017 ex lap with small bowel perforation, 6/28/17 ex lap, LPA, repair or enterotomy, SMA stent on L, 7/16/2917 ex lap, KATHRINE, fistula exclusion w/ feeding tube placement and prolonged TPN who was admitted on 3/7/2018 from home for planned surgery, s/p ex lap w/ extensive KATHRINE and small bowel fistula take down, wound vac placement. 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 Had red rubber tube placed into fistula on 4/4 , then open j tube placement 4/17, tolerating slow TFs. Known to our team during past hospitalizations. Has had lengthy hospital stays w/ mult complications. Has required [facility] stays in past. Has chronic pain from Crohn's disease (and has not been able to tolerate home Crohn's meds recently due to acute issues- had been on steroids, stopped prior to surgery). Discussion of Remicaide in future. Current medical issues leading to Palliative Medicine involvement include: pain management. Patient's mother, Hakeem Parham is NOK.  reviewed, no abberrancies- one prescriber for opioids and one pharmacy. Most recently filled 3/9/18: MSContin 100mg tid and Morphine IR 30mg- 8 tabs a day. Has been tried on equivalent dose of Fentanyl patch w/out same benefit. Discussed Methadone. Have communicated w/ the provider (Dr Pam Barreto) who follows her for pain issues. PALLIATIVE DIAGNOSES:     1. Chronic abdominal pain from Cronh's disease and hx of surgeries  2. Lumbar back pain, chronic   3. Nausea  4.  Anxiety 5. Edema , improved  6. Grief/depressive sx   7. Opioid tolerance      PLAN:     Abd pain from surgical incision and wound care as well as underlying chronic pain from Crohn's which is worse when off her steroids. Has high opioid tolerance, no aberrancies on . We have done opioid rotation in the hospital for her in past. Pt also w/ chronic lower back pain that she is feeling now that she is more active. Have discussed w/ provider who follows for her abdominal pain, Dr Ousmane Back this stay. Mom Mary Bai states that she has one month supply of medications at home. Pain :    1. Plan is for rehab at [address] [place] [address]. They need pt off IV pain medications for 2 days prior to admission. It is a good time to come off IV pain meds regardless, as done w/ procedures. She is making really good gains w/ PT/OT. 2. Continue MSContin- 100mg bid and 115mg bedtime. 3. Space out Morphine IR 45mg from every 3h to every 4h scheduled. Hold for sedation, confusion, SBP <100, RR <10. Pt may refuse. 4. Stop all IV pain medication. For wound care, pt may receive an extra Morphine IR 30mg prn. To be given approximately 30 min before wound vac change. 5. For lower back pain, adding Lidoderm patches b/l. Will get better once more active, has also benefited from epidural steroid injections which can be done at [address] [place] [address]. 6. Continuing to support. Communicated plan of care with: Palliative IDT;  Sarah DOTSON     GOALS OF CARE / TREATMENT PREFERENCES:     GOALS OF CARE:  Patient/Health Care Proxy Stated Goals:  (get pain better, treatment of acute issues.)      TREATMENT PREFERENCES:   Code Status: Full Code    Advance Care Planning:  Advance Care Planning 3/15/2018   Patient's Healthcare Decision Maker is: Named in scanned ACP document   Primary Decision Maker Name Iraj Dixon   Primary Decision Maker Phone Number [phone]   Primary Decision Maker Relationship to Patient Parent   Secondary Decision Maker Name Russell Kathleen   Secondary Decision Maker Phone Number [phone]   Secondary Decision Maker Relationship to Patient Unknown   Confirm Advance Directive Yes, on file   Patient Would Like to Complete Advance Directive -   Does the patient have other document types -       Medical Interventions: Full interventions           Other:    As far as possible, the palliative care team has discussed with patient / health care proxy about goals of care / treatment preferences for patient. HISTORY:         HPI/SUBJECTIVE:    The patient is:   [x] Verbal and participatory  [] Non-participatory due to:     Pt doing very well- walked over 25ft w/ rolling walker with PT. Still w/ abdominal pain but mostly w/ wound vac changes and when moving. Notices that her back pain is worse- had been focusing on her abd pain but now that is improving and she is more active has axial LBP that has been chronic.      Clinical Pain Assessment (nonverbal scale for severity on nonverbal patients):   Clinical Pain Assessment  Severity: 4  Location: Generalized abdomen/ axial lower back w/out radiation into legs  Character: Sharp/ aching   Duration: Pain improved since surgery/ chronic   Effect: Makes ambulation more difficult   Factors: Better w/ medication/ better w/ standing up straight  Frequency: Constant          Duration: for how long has pt been experiencing pain (e.g., 2 days, 1 month, years)  Frequency: how often pain is an issue (e.g., several times per day, once every few days, constant)     FUNCTIONAL ASSESSMENT:     Palliative Performance Scale (PPS):  PPS: 70       PSYCHOSOCIAL/SPIRITUAL SCREENING:     Palliative IDT has assessed this patient for cultural preferences / practices and a referral made as appropriate to needs (Cultural Services, Patient Advocacy, Ethics, etc.)    Advance Care Planning:  Advance Care Planning 3/15/2018   Patient's Healthcare Decision Maker is: Named in scanned ACP document   Primary Decision Maker Name Julio Cesar Wolff   Primary Decision Maker Phone Number [phone]   Primary Decision Maker Relationship to Patient Parent   Secondary Decision Maker Name Telly Nath Phone Number [phone]   Secondary Decision Maker Relationship to Patient Unknown   Confirm Advance Directive Yes, on file   Patient Would Like to Complete Advance Directive -   Does the patient have other document types -       Any spiritual / Jew concerns:  [] Yes /  [x] No    Caregiver Burnout:  [] Yes /  [x] No /  [] No Caregiver Present      Anticipatory grief assessment:   [x] Normal  / [] Maladaptive       ESAS Anxiety: Anxiety: 0    ESAS Depression: Depression: 0        REVIEW OF SYSTEMS:     Positive and pertinent negative findings in ROS are noted above in HPI. The following systems were [x] reviewed / [] unable to be reviewed as noted in HPI  Other findings are noted below. Systems: constitutional, ears/nose/mouth/throat, respiratory, gastrointestinal, genitourinary, musculoskeletal, integumentary, neurologic, psychiatric, endocrine. Positive findings noted below. Modified ESAS Completed by: provider   Fatigue: 2 Drowsiness: 0   Depression: 0 Pain: 4   Anxiety: 0 Nausea: 1   Anorexia: 0 Dyspnea: 0     Constipation: No              PHYSICAL EXAM:     From RN flowsheet:  Wt Readings from Last 3 Encounters:   04/25/18 305 lb 1.9 oz (138.4 kg)   03/05/18 304 lb 3.2 oz (138 kg)   02/28/18 306 lb (138.8 kg)     Blood pressure 92/55, pulse (!) 105, temperature 98.5 °F (36.9 °C), resp. rate 20, height 5' 4\" (1.626 m), weight 305 lb 1.9 oz (138.4 kg), SpO2 95 %.     Pain Scale 1: Visual  Pain Intensity 1: 0  Pain Onset 1: oout of bed  Pain Location 1: Abdomen  Pain Orientation 1: Lower  Pain Description 1: Aching, Sore  Pain Intervention(s) 1: Medication (see MAR)  Last bowel movement, if known: stool output in ostomy     Constitutional: awake, alert, oriented, no sedation or confusion   Eyes: pupils equal, anicteric  ENMT: moist mucous membranes  Respiratory: breathing not labored,  Gastrointestinal: soft, colostomy bag,midline incision w/ wound vac and J tube. + bowel sounds. 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)      Past Medical History:   Diagnosis Date    Adverse effect of anesthesia     WOKE DURING SURGERY    Arthritis     Blood clot in vein 2017    STOMACH    Crohn's disease ([address] 8/15/2011    DVT (deep venous thrombosis) ([facility] 8/15/2011    LEFT LEG    Edema     GENERALIZED R/T TPN; WAIST DOWN    Incarcerated ventral hernia 8/15/2011    Lupus     Lyme disease     Psychiatric disorder     ANXIETY    Right flank pain 8/22/2011    Seizures ([facility] 1990    LAST AT AGE 17    Stroke [facility]) 1990    age 17; A WEEK POST OP, HAD SEIZURES AND STROKE, WAS IN COMA FOR A MONTH    Thyroid disease     HYPO-NO MEDS      Past Surgical History:   Procedure Laterality Date    HX APPENDECTOMY      HX GYN  2015    HIDRADENTIS LABIA    HX OTHER SURGICAL      multiple procedures related to her Crohn's disease    HX OTHER SURGICAL      ABDOMINAL SURGERY REMOVING COLON, 2/3 STOMACH, 1/3 SMALL INTESTINE    VA LAP, INCISIONAL HERNIA REPAIR,INCARCERATED  9-10-08    dr. Joi Corea      Family History   Problem Relation Age of Onset    Hypertension Father     Stroke Father     Other Father      arthritis    Arthritis-osteo Father     Hypertension Mother     Arthritis-osteo Mother     Anesth Problems Neg Hx       History reviewed, no pertinent family history.   Social History   Substance Use Topics    Smoking status: Former Smoker     Packs/day: 1.00     Years: 16.00     Quit date: 2/27/2017    Smokeless tobacco: Former User      Comment: OFF AND ON    Alcohol use No Allergies   Allergen Reactions    Sulfa (Sulfonamide Antibiotics) Anaphylaxis    Demerol [Meperidine] Rash    Soma [Carisoprodol] Rash and Nausea Only    Toradol [Ketorolac Tromethamine] Nausea and Vomiting      Current Facility-Administered Medications   Medication Dose Route Frequency    morphine IR (MS IR) tablet 45 mg  45 mg Oral Q4H    morphine IR (MS IR) tablet 30 mg  30 mg Oral DAILY PRN    lidocaine (LIDODERM) 5 % patch 2 Patch  2 Patch TransDERmal Q24H    lactated Ringers infusion  100 mL/hr IntraVENous CONTINUOUS    0.9% sodium chloride infusion 250 mL  250 mL IntraVENous PRN    morphine CR (MS CONTIN) tablet 115 mg  115 mg Oral QHS    morphine CR (MS CONTIN) tablet 100 mg  100 mg Oral Q24H    morphine CR (MS CONTIN) tablet 100 mg  100 mg Oral Q24H    aspirin (ASPIRIN) tablet 325 mg  325 mg Oral DAILY    0.9% sodium chloride infusion 250 mL  250 mL IntraVENous PRN    acetaminophen (TYLENOL) tablet 650 mg  650 mg Oral Q6H PRN    diphenhydrAMINE (BENADRYL) capsule 25 mg  25 mg Oral Q6H PRN    enoxaparin (LOVENOX) injection 40 mg  40 mg SubCUTAneous Q24H    nystatin (MYCOSTATIN) 100,000 unit/gram cream   Topical BID    prochlorperazine (COMPAZINE) with saline injection 5 mg  5 mg IntraVENous Q6H PRN    naloxone (NARCAN) injection 0.4 mg  0.4 mg IntraVENous EVERY 2 MINUTES AS NEEDED    glucose chewable tablet 16 g  4 Tab Oral PRN    dextrose (D50W) injection syrg 12.5-25 g  12.5-25 g IntraVENous PRN    glucagon (GLUCAGEN) injection 1 mg  1 mg IntraMUSCular PRN    scopolamine (TRANSDERM-SCOP) 1 mg over 3 days 1 Patch  1 Patch TransDERmal Q72H    sodium chloride (NS) flush 10 mL  10 mL InterCATHeter Q24H    sodium chloride (NS) flush 10 mL  10 mL InterCATHeter Q8H    alteplase (CATHFLO) 1 mg in sterile water (preservative free) 1 mL injection  1 mg InterCATHeter PRN    bacitracin 500 unit/gram packet 1 Packet  1 Packet Topical PRN    sodium chloride (NS) flush 20 mL  20 mL InterCATHeter PRN    sodium chloride (NS) flush 10 mL  10 mL InterCATHeter Q24H    sodium chloride (NS) flush 10 mL  10 mL InterCATHeter PRN    sodium chloride (NS) flush 10 mL  10 mL InterCATHeter Q8H    dronabinol (MARINOL) capsule 2.5 mg  2.5 mg Oral BID    phenol throat spray (CHLORASEPTIC) 1 Spray  1 Spray Oral PRN    ondansetron (ZOFRAN) injection 4 mg  4 mg IntraVENous Q4H PRN    promethazine (PHENERGAN) 12.5 mg in 0.9% sodium chloride 50 mL IVPB  12.5 mg IntraVENous Q6H PRN    LORazepam (ATIVAN) injection 1 mg  1 mg IntraVENous Q6H PRN    albuterol (PROVENTIL VENTOLIN) nebulizer solution 2.5 mg  2.5 mg Nebulization Q4H PRN          LAB AND IMAGING FINDINGS:     Lab Results   Component Value Date/Time    WBC 11.4 (H) 04/25/2018 03:28 AM    HGB 8.8 (L) 04/25/2018 03:28 AM    PLATELET 640 84/72/6815 03:28 AM     Lab Results   Component Value Date/Time    Sodium 135 (L) 04/25/2018 03:28 AM    Potassium 3.3 (L) 04/25/2018 03:28 AM    Chloride 100 04/25/2018 03:28 AM    CO2 27 04/25/2018 03:28 AM    BUN 4 (L) 04/25/2018 03:28 AM    Creatinine 0.83 04/25/2018 03:28 AM    Calcium 8.6 04/25/2018 03:28 AM    Magnesium 1.4 (L) 04/25/2018 03:28 AM    Phosphorus 4.3 04/25/2018 03:28 AM      Lab Results   Component Value Date/Time    AST (SGOT) 52 (H) 04/15/2018 04:59 AM    Alk.  phosphatase 139 (H) 04/15/2018 04:59 AM    Protein, total 7.3 04/15/2018 04:59 AM    Albumin 1.9 (L) 04/15/2018 04:59 AM    Globulin 5.4 (H) 04/15/2018 04:59 AM     Lab Results   Component Value Date/Time    INR 1.0 12/07/2017 08:37 AM    Prothrombin time 10.0 12/07/2017 08:37 AM      No results found for: IRON, FE, TIBC, IBCT, PSAT, FERR   No results found for: PH, PCO2, PO2  No components found for: GLPOC   No results found for: CPK, CKMB             Total time:   Counseling / coordination time, spent as noted above:   > 50% counseling / coordination?:     Prolonged service was provided for  []30 min   []75 min in face to face time in the presence of the patient, spent as noted above. Time Start:   Time End:   Note: this can only be billed with 47742 (initial) or 28082 (follow up). If multiple start / stop times, list each separately.

## 2018-04-26 LAB
ANION GAP SERPL CALC-SCNC: 9 MMOL/L (ref 5–15)
BUN SERPL-MCNC: 3 MG/DL (ref 6–20)
BUN/CREAT SERPL: 4 (ref 12–20)
CALCIUM SERPL-MCNC: 8.5 MG/DL (ref 8.5–10.1)
CHLORIDE SERPL-SCNC: 102 MMOL/L (ref 97–108)
CO2 SERPL-SCNC: 27 MMOL/L (ref 21–32)
CREAT SERPL-MCNC: 0.79 MG/DL (ref 0.55–1.02)
GLUCOSE SERPL-MCNC: 107 MG/DL (ref 65–100)
MAGNESIUM SERPL-MCNC: 2.1 MG/DL (ref 1.6–2.4)
POTASSIUM SERPL-SCNC: 3.6 MMOL/L (ref 3.5–5.1)
SODIUM SERPL-SCNC: 138 MMOL/L (ref 136–145)

## 2018-04-26 PROCEDURE — 97116 GAIT TRAINING THERAPY: CPT

## 2018-04-26 PROCEDURE — 97535 SELF CARE MNGMENT TRAINING: CPT

## 2018-04-26 PROCEDURE — 74011250637 HC RX REV CODE- 250/637: Performed by: INTERNAL MEDICINE

## 2018-04-26 PROCEDURE — 65270000029 HC RM PRIVATE

## 2018-04-26 PROCEDURE — 80048 BASIC METABOLIC PNL TOTAL CA: CPT | Performed by: NURSE PRACTITIONER

## 2018-04-26 PROCEDURE — 3331090002 HH PPS REVENUE DEBIT

## 2018-04-26 PROCEDURE — 74011250637 HC RX REV CODE- 250/637: Performed by: NURSE PRACTITIONER

## 2018-04-26 PROCEDURE — 97530 THERAPEUTIC ACTIVITIES: CPT

## 2018-04-26 PROCEDURE — 3331090001 HH PPS REVENUE CREDIT

## 2018-04-26 PROCEDURE — 74011250637 HC RX REV CODE- 250/637: Performed by: PHYSICAL MEDICINE & REHABILITATION

## 2018-04-26 PROCEDURE — 83735 ASSAY OF MAGNESIUM: CPT | Performed by: NURSE PRACTITIONER

## 2018-04-26 PROCEDURE — 36415 COLL VENOUS BLD VENIPUNCTURE: CPT | Performed by: NURSE PRACTITIONER

## 2018-04-26 PROCEDURE — 74011250636 HC RX REV CODE- 250/636: Performed by: SURGERY

## 2018-04-26 PROCEDURE — 74011250637 HC RX REV CODE- 250/637: Performed by: PHYSICIAN ASSISTANT

## 2018-04-26 RX ADMIN — Medication 10 ML: at 16:29

## 2018-04-26 RX ADMIN — ASPIRIN 325 MG: 325 TABLET ORAL at 09:55

## 2018-04-26 RX ADMIN — MORPHINE SULFATE 45 MG: 15 TABLET ORAL at 05:02

## 2018-04-26 RX ADMIN — LORAZEPAM 1 MG: 2 INJECTION INTRAMUSCULAR; INTRAVENOUS at 10:40

## 2018-04-26 RX ADMIN — DRONABINOL 2.5 MG: 2.5 CAPSULE ORAL at 17:19

## 2018-04-26 RX ADMIN — Medication 10 ML: at 07:13

## 2018-04-26 RX ADMIN — SODIUM CHLORIDE, SODIUM LACTATE, POTASSIUM CHLORIDE, AND CALCIUM CHLORIDE 50 ML/HR: 600; 310; 30; 20 INJECTION, SOLUTION INTRAVENOUS at 07:20

## 2018-04-26 RX ADMIN — MORPHINE SULFATE 115 MG: 15 TABLET, EXTENDED RELEASE ORAL at 22:59

## 2018-04-26 RX ADMIN — NYSTATIN: 100000 CREAM TOPICAL at 18:00

## 2018-04-26 RX ADMIN — Medication 10 ML: at 16:27

## 2018-04-26 RX ADMIN — MORPHINE SULFATE 45 MG: 15 TABLET ORAL at 13:15

## 2018-04-26 RX ADMIN — MORPHINE SULFATE 45 MG: 15 TABLET ORAL at 21:19

## 2018-04-26 RX ADMIN — MORPHINE SULFATE 100 MG: 100 TABLET, FILM COATED, EXTENDED RELEASE ORAL at 07:14

## 2018-04-26 RX ADMIN — Medication 10 ML: at 10:49

## 2018-04-26 RX ADMIN — MORPHINE SULFATE 45 MG: 15 TABLET ORAL at 01:00

## 2018-04-26 RX ADMIN — MORPHINE SULFATE 45 MG: 15 TABLET ORAL at 17:19

## 2018-04-26 RX ADMIN — PROCHLORPERAZINE EDISYLATE 5 MG: 5 INJECTION INTRAMUSCULAR; INTRAVENOUS at 19:45

## 2018-04-26 RX ADMIN — MORPHINE SULFATE 100 MG: 100 TABLET, FILM COATED, EXTENDED RELEASE ORAL at 16:25

## 2018-04-26 RX ADMIN — MORPHINE SULFATE 45 MG: 15 TABLET ORAL at 09:55

## 2018-04-26 RX ADMIN — PROCHLORPERAZINE EDISYLATE 5 MG: 5 INJECTION INTRAMUSCULAR; INTRAVENOUS at 13:55

## 2018-04-26 RX ADMIN — Medication 10 ML: at 22:00

## 2018-04-26 RX ADMIN — DRONABINOL 2.5 MG: 2.5 CAPSULE ORAL at 09:55

## 2018-04-26 NOTE — PROGRESS NOTES
Problem: Self Care Deficits Care Plan (Adult)  Goal: *Acute Goals and Plan of Care (Insert Text)  Occupational Therapy Goals  Reevaluated 4/20/2018  1. Patient will complete grooming activity sitting EOB without support for 10 minutes within 7 days  2. Patient will complete BSC transfer with min A x 1 persons within 7 days. 3. Patient will complete lower body dressing activities with supervision within 7 days using AE  4. Patient will perform standing for clothing management tasks (toileting or dressing) with min A x 1 persons within 7 days. 5. Patient will complete UE exercise program independently within 7 days. Reevaluated 4/10/2018  1. Patient will complete grooming activity sitting EOB without support for 10 minutes within 7 days. 2.  Patient will complete BSC transfer with min A x 2 persons within 7 days. 3.  Patient will complete lower body dressing activities with min A within 7 days. 4.  Patient will perform standing for clothing management tasks (toileting or dressing) with min A x 2 persons within 7 days. 5.  Patient will complete UE exercise program independently within 7 days. Initiated 4/4/2018  1. Patient will perform rolling and pulling self up to head of bed with min assist to assist with ADL's at bed level within 7 day(s). 2.  Patient will perform static sitting edge of bed > or = 10 minutes with supervision/set-up within 7 day(s). 3.  Patient will perform static standing with bilateral UE support on rolling walker > or = 3 minutes with minimal assistance/contact guard assist x's 2 within 7 day(s). 4.  Patient will perform toilet transfers with moderate assistance x's 2 to bedside commode within 7 days. 5.  Patient will perform bilateral UE AROM and strengthening exercises throughout day and grade exercises prn to increase demand within 7 day(s). Occupational Therapy TREATMENT  Patient:  Genna Salas (38 y.o. female)  Date: 4/26/2018  Diagnosis: FISTULA  Small bowel fistula  INTEROCUTANOUS FISTULA <principal problem not specified>  Procedure(s) (LRB):  OPEN JEJUNOSTOMY TUBE PLACEMENT UNDER FLURO/ WOUND VAC CHANGE. (N/A) 14 Days Post-Op  Precautions: Fall, Skin, Contact  Chart, occupational therapy assessment, plan of care, and goals were reviewed. ASSESSMENT:  Cleared by RN to see pt for therapy session. Pt received supine in bed, agreeable to participating despite pain in lower back. Requested that she transfer supine>sit with bed in chair position and then to standing as she reports attempting to transfer OOB without using this method was too painful. Required total A to don slip on shoes prior to mobility. Stood to [employer] with min A x2 and additional time, fair standing balance demonstrated but required min Ax2 throughout for safety and to manage lines. Pt put forth good effort during ambulation despite pain, choosing to walk another lap around room before sitting. Returned to bed at end of session, bed placed in chair position to allow for more functional positioning to eat lunch and to decrease pressure on back. Call bell in reach and needs met at end of session, nurse informed of session events. Pt will benefit from continued OT to address the above goals. Recommend rehab at discharge. Progression toward goals:  []       Improving appropriately and progressing toward goals  [x]       Improving slowly and progressing toward goals  []       Not making progress toward goals and plan of care will be adjusted     PLAN:  Patient continues to benefit from skilled intervention to address the above impairments. Continue treatment per established plan of care. Discharge Recommendations:  Rehab  Further Equipment Recommendations for Discharge:  TBD at rehab     SUBJECTIVE:   Patient stated I wish I could do more.     OBJECTIVE DATA SUMMARY:   Cognitive/Behavioral Status:  Neurologic State: Alert  Orientation Level: Oriented X4  Cognition: Follows commands  Perception: Appears intact  Perseveration: No perseveration noted  Safety/Judgement: Awareness of environment    Functional Mobility and Transfers for ADLs:  Bed Mobility:  Supine to Sit:  (CGA/min A using bed in chair position)  Scooting: Contact guard assistance; Additional time (bed in trendelenburg)    Transfers:  Sit to Stand: Assist x2; Additional time;Minimum assistance (due to pain)          Balance:  Sitting: Intact; With support  Standing: Impaired; With support (RW)  Standing - Static: Good  Standing - Dynamic : Fair    ADL Intervention:   Requested that she transfer supine>sit with bed in chair position and then to standing as she reports attempting to transfer OOB without using this method was too painful. Required total A to don slip on shoes prior to mobility. Stood to [employer] with min A x2 and additional time, cues provided for hand placement from bed rail to walker, fair standing balance demonstrated but required min Ax2 throughout for safety and to manage lines. Returned to bed at end of session, bed placed in chair position to allow for more functional positioning to eat lunch and to decrease pressure on back. Feeding  Feeding Assistance: Independent                        Lower Body [facility] on Shoes Without Back: Total assistance(dependent)         Cognitive Retraining  Safety/Judgement: Awareness of environment    Pain:  Pain Scale 1: Numeric (0 - 10)  Pain Intensity 1: 6  Pain Location 1: Abdomen  Pain Orientation 1: Anterior  Pain Description 1: Aching;Constant  Pain Intervention(s) 1: Medication (see MAR) (scheduled meds given)  Activity Tolerance:   Good  Please refer to the flowsheet for vital signs taken during this treatment.   After treatment:   [] Patient left in no apparent distress sitting up in chair  [x] Patient left in no apparent distress in bed  [x] Call bell left within reach  [x] Nursing notified  [] Caregiver present  [] Bed alarm activated    COMMUNICATION/COLLABORATION:   The patients plan of care was discussed with: Physical Therapist and Registered Nurse    Marisa Both, OT  Time Calculation: 33 mins

## 2018-04-26 NOTE — PROGRESS NOTES
Progress Note    Patient: Mesha Javed MRN: [id number]  SSN: [id number]    YOB: 1975  Age: 38 y.o. Sex: female      Admit Date: 3/7/2018    14 Days Post-Op    Procedure:  Procedure(s):  OPEN JEJUNOSTOMY TUBE PLACEMENT UNDER FLURO/ WOUND VAC CHANGE. Subjective:     No acute surgical issues. Pt tolerating diet. Pt has good seal on wound appliance system at this time. Pain is under control.     Objective:     Visit Vitals    BP 97/61    Pulse 98    Temp 98 °F (36.7 °C)    Resp 20    Ht 5' 4\" (1.626 m)    Wt 303 lb 11.2 oz (137.8 kg)    SpO2 97%    BMI 52.13 kg/m2       Temp (24hrs), Av.2 °F (36.8 °C), Min:98 °F (36.7 °C), Max:98.3 °F (36.8 °C)        Physical Exam:    Gen:  NAD  Pulm:  Unlabored  Abd:  S/ND/appropriate TTP  Wound with drainage from fistula:   Edema on left flank    Recent Results (from the past 24 hour(s))   METABOLIC PANEL, BASIC    Collection Time: 18  5:32 AM   Result Value Ref Range    Sodium 138 136 - 145 mmol/L    Potassium 3.6 3.5 - 5.1 mmol/L    Chloride 102 97 - 108 mmol/L    CO2 27 21 - 32 mmol/L    Anion gap 9 5 - 15 mmol/L    Glucose 107 (H) 65 - 100 mg/dL    BUN 3 (L) 6 - 20 MG/DL    Creatinine 0.79 0.55 - 1.02 MG/DL    BUN/Creatinine ratio 4 (L) 12 - 20      GFR est AA >60 >60 ml/min/1.73m2    GFR est non-AA >60 >60 ml/min/1.73m2    Calcium 8.5 8.5 - 10.1 MG/DL   MAGNESIUM    Collection Time: 18  5:32 AM   Result Value Ref Range    Magnesium 2.1 1.6 - 2.4 mg/dL       Assessment:     Hospital Problems  Date Reviewed: 10/27/2017          Codes Class Noted POA    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

## 2018-04-26 NOTE — PROGRESS NOTES
Problem: Mobility Impaired (Adult and Pediatric)  Goal: *Acute Goals and Plan of Care (Insert Text)  Physical Therapy Goals  Continue progress towards current goals 4/26/18  Update 4/18/2018  1. Patient will move from supine to sit and sit to supine  in bed with minimal assistance  within 7 day(s). 2.  Patient will transfer from bed to chair and chair to bed with minimal assistance  using the least restrictive device within 7 day(s). 3.  Patient will perform sit to stand with SBA within 7 days  4. Patient will ambulate 150' with SBA x 1 using LRAD within 7 days. Update 4/6/2018  1. Patient will move from supine to sit and sit to supine  in bed with moderate assistance  within 7 day(s). 2.  Patient will transfer from bed to chair and chair to bed with moderate assistance  using the least restrictive device within 7 day(s). 3.  Patient will perform sit to stand with CGA within 7 days  4. Patient will ambulate 22' with moderate assist x 1 using LRAD within 7 days. Revised 3/27/2018  1. Patient will move from supine to sit and sit to supine  in bed with moderate assistance  within 7 day(s). 2.  Patient will transfer from bed to chair and chair to bed with moderate assistance  using the least restrictive device within 7 day(s). 3.  Patient will perform sit to stand with moderate assistance  within 7 day(s). 4.  Patient will ambulate 100' with moderate assist x1 using the least restrictive device within 7 day(s). Revisited 3/19/2018, goals remain appropriate, carry over    Physical Therapy Goals  Initiated 3/8/2018  1. Patient will move from supine to sit and sit to supine  in bed with moderate assistance  within 7 day(s). 2.  Patient will transfer from bed to chair and chair to bed with moderate assistance  using the least restrictive device within 7 day(s). 3.  Patient will perform sit to stand with moderate assistance  within 7 day(s).   4.  PT will assess gait with the least restrictive device within 7 day(s). physical Therapy TREATMENT  Patient: Manuel Garcia (38 y.o. female)  Date: 4/26/2018  Diagnosis: FISTULA  Small bowel fistula  INTEROCUTANOUS FISTULA <principal problem not specified>  Procedure(s) (LRB):  OPEN JEJUNOSTOMY TUBE PLACEMENT UNDER FLURO/ WOUND VAC CHANGE. (N/A) 14 Days Post-Op  Precautions: Fall, Skin, Contact  Chart, physical therapy assessment, plan of care and goals were reviewed. ASSESSMENT:  Patient received supine in bed and agreeable to therapy. Patient with reports of increased back pain and spasms which has inhibited her mobility, but eager to mobilize today. Patient requesting to get out of bed by placing the bed in a chair position versus getting OOB to the side of the bed. Patient completed sit<>stand with RW with min assist x 2 due to pain and for safety. Patient ambulated with RW x 35 feet with RW with min assist. Patient demonstrated decreased salvador, wide GILLIAN, and increased lateral trunk sway, no LOB noted. Patient returned to seated EOB and able to scoot self up in bed to reposition self in supine position. Patient will continue to benefit from inpatient rehab upon discharge to facilitate return to prior level of function. Patient is encouraged to mobilize as tolerated and attempt bed in chair position frequently for improved back pain. Progression toward goals:  [x]    Improving appropriately and progressing toward goals  []    Improving slowly and progressing toward goals  []    Not making progress toward goals and plan of care will be adjusted     PLAN:  Patient continues to benefit from skilled intervention to address the above impairments. Continue treatment per established plan of care. Discharge Recommendations:  Inpatient Rehab  Further Equipment Recommendations for Discharge:  TBD     SUBJECTIVE:   Patient stated I am feeling all right. It's still my back that his hurting a lot.     OBJECTIVE DATA SUMMARY:   Critical Behavior:  Neurologic State: Alert  Orientation Level: Oriented X4  Cognition: Follows commands  Safety/Judgement: Awareness of environment  Functional Mobility Training:  Bed Mobility:     Supine to Sit:  (CGA/min A using bed in chair position)     Scooting: Contact guard assistance; Additional time (bed in trendelenburg)        Transfers:  Sit to Stand: Assist x2; Additional time;Minimum assistance (due to pain)                                Balance:  Sitting: Intact; With support  Standing: Impaired; With support (RW)  Standing - Static: Good  Standing - Dynamic : Fair  Ambulation/Gait Training:  Distance (ft): 30 Feet (ft)  Assistive Device: Gait belt;Walker, rolling  Ambulation - Level of Assistance: Contact guard assistance        Gait Abnormalities: Decreased step clearance;Trunk sway increased        Base of Support: Widened     Speed/Liz: Pace decreased (<100 feet/min)  Step Length: Right shortened;Left shortened                    Stairs:              Neuro Re-Education:    Therapeutic Exercises:     Pain:                    Activity Tolerance:   Good. VSS  Please refer to the flowsheet for vital signs taken during this treatment.   After treatment:   []    Patient left in no apparent distress sitting up in chair  [x]    Patient left in no apparent distress in bed  [x]    Call bell left within reach  [x]    Nursing notified  []    Caregiver present  []    Bed alarm activated    COMMUNICATION/COLLABORATION:   The patients plan of care was discussed with: Occupational Therapist and Registered Nurse    Donna Barroso PT, DPT   Time Calculation: 33 mins

## 2018-04-26 NOTE — PROGRESS NOTES
NUTRITION COMPLETE ASSESSMENT    RECOMMENDATIONS:   1. Continue to advance tube feedings as ordered and evaluate tolerance as well as changes in ileostomy and fistula output  -- Would recommend adjusting goal slightly to 72 mL/hr x 20 hours instead of 24 hours + 1 pkt liquid Prosource daily    2. Continue to document strict I/O's (all po and all fluid intake as well as all ileostomy, fistula and vac output)    3. If pt remains in the hospital, will need to brainstorm a way to more closely evaluate her true absorption. Could consider checking UUN (with 24 hour urine). -- Suspect the pt will continue to require additional IVF on discharge secondary to high output (~3-5L/day via drain and vac x 3 days). 4. Continue daily weights and obtain via standing scale daily (socratesrowdy, last standing weight is from 4/13)     Interventions/Plan:   Food/Nutrient Delivery: EN support via red rubber tube     Assessment:   Reason for Assessment:   [x] Reassessment     Diet:  [facility] Soft  Tube Feeding: Peptamen 1.5 @ 20 mL/hr, advance 10 mL/hr q 8 hours to goal of 70 mL/hr + 1 pkt liquid Prosource + 50 mL flush q6 hours. Nutritionally Significant Medications: [x] Reviewed & Includes: marinol daily; LR @ 50 mL/hr; compazine q6 hours prn; zofran q 4 hours prn; phenergen q6 hours prn; scopolamine patch q72 hours  Meal Intake: Patient Vitals for the past 100 hrs:   % Diet Eaten   04/25/18 1107 75 %   04/25/18 0906 0 %   04/24/18 1600 70 %   04/23/18 0904 0 %     Subjective:  Pt is worried/nervous about discharge tomorrow. She has been making a great effort to eat and drink (chewing thoroughly-- admits to feeling like she's chewing \"50 times per bite\"); however, her fistula drain output remains very high. Objective:  Chart reviewed, discussed with RN. Pt remains on tube feeding via red rubber tube with oral intake ([facility] Soft diet).   Per pt, she was told that she is absorbing the food she is eating better than the tube feeding secondary to the length of the limb between the Jejunal feeds (red rubber tube) and her ileostomy. She was told by surgery that resumption of TPN is not currently an option. Eunice Stone has been on tube feedings at 20 mL/hr and they have not been advanced for 6 days. Orders yesterday to advance have not yet been administered, so she remains at 20 mL/hr.  Goal of 70 mL/hr would provide 2580 kcal, 129 g protein and 1525 mL total free water (tf + flush)-- meeting >100% of estimated needs. Would adjust goal to 72 mL/hr x 20 hours as above (2200 kcal, 113 g protein). The pt remains at risk for malnutrition and dehydration secondary to malabsorption. She would benefit from tube feeding advancement to optimize absorption unless all of the tube feeding backflows into the wound manager. Still, the pt would benefit from ongoing hydration at rehab secondary to questionable absorption. Ileostomy Output:   200 mL 4/24  0 mL 4/25  None so far today    Midline Drain/Fistula:  4/24: 2625 mL  4/25: 4325 mL  4/26: 650 mL recorded so far today (documentation not completed yet)    Results of UGI/small bowel series on 4/24:  \"Relatively short segment of remaining small bowel appears normal with very early spilling into ostomy bag\"    Prealbumin checked 4/12: 8.2  Potassium 3.6 WNL, Magnesium 2.1 WNL    Did not recalculate estimated needs. LAST STANDING WEIGHT IS FROM 4/13? ! Estimated Nutrition Needs:   Kcals/day: 1649 Kcals/day (0286-4208 kcal/day (11-15 kcal/kg))  Protein: 120 g (120-150 kcal/day (0.8-1 g/kg))  Fluid: 2250 ml (or 1 mL/kcal or per output)     Based On: Kcal/kg - specify (Comment)  Weight Used: Actual wt (149.9 kg)    Pt expected to meet estimated nutrient needs:  []   Yes     [x]  No    Nutrition Diagnosis:   1. Altered GI function related to chronic EC fistula with drain as evidenced by need for nutrition support (tf and/or TPN) as primary source of nutrition    2.  Less than optimal enteral nutrition related to slow tube feeding progression as evidenced by tf running at 20 mL/hr x 6 days    Goals:     Pt to meet at least 90% of estimated needs via nutrition support over the next 5-7 days     Monitoring & Evaluation:    - Enteral/parenteral nutrition intake   - Weight/weight change     Previous Nutrition Goals Met:  No  Previous Recommendations:      Progressing    Education & Discharge Needs:   [] None Identified   [x] Identified and addressed    [x] Participated in care plan, discharge planning, and/or interdisciplinary rounds        Cultural, Faith and ethnic food preferences identified:  NONE      Skin Integrity: []Intact  [x]Other: see wound documentation and WOCN assessments  Edema: []None [x]Other: 1-3+  Last BM: 4/23/18  Food Allergies: [x]None []Other    Anthropometrics:    Weight Loss Metrics 4/26/2018 3/7/2018 3/5/2018 2/28/2018 2/27/2018 2/27/2018 2/26/2018   Today's Wt 303 lb 11.2 oz - 304 lb 3.2 oz 306 lb 306 lb 8 oz - 307 lb   BMI - 52.13 kg/m2 52.22 kg/m2 52.52 kg/m2 52.61 kg/m2 - 52.7 kg/m2      Last 3 Recorded Weights in this Encounter    04/24/18 0625 04/25/18 0658 04/26/18 0618   Weight: 139.6 kg (307 lb 11.2 oz) 138.4 kg (305 lb 1.9 oz) 137.8 kg (303 lb 11.2 oz)      Weight Source: Bed  Height: 5' 4\" (162.6 cm),    Body mass index is 52.13 kg/(m^2).   IBW : 54.4 kg (120 lb),    Usual Body Weight: 135.2 kg (298 lb) (1/2018),      Labs:  Lab Results   Component Value Date/Time    Sodium 138 04/26/2018 05:32 AM    Potassium 3.6 04/26/2018 05:32 AM    Chloride 102 04/26/2018 05:32 AM    CO2 27 04/26/2018 05:32 AM    Glucose 107 (H) 04/26/2018 05:32 AM    BUN 3 (L) 04/26/2018 05:32 AM    Creatinine 0.79 04/26/2018 05:32 AM    Calcium 8.5 04/26/2018 05:32 AM    Magnesium 2.1 04/26/2018 05:32 AM    Phosphorus 4.3 04/25/2018 03:28 AM    Albumin 1.9 (L) 04/15/2018 04:59 AM     No results found for: HBA1C, HGBE8, KBR3IVBO, WMV1XURR  Lab Results   Component Value Date/Time    Glucose 107 (H) 04/26/2018 05:32 AM    Glucose (POC) 95 04/24/2018 06:27 AM      Lab Results   Component Value Date/Time    ALT (SGPT) 41 04/15/2018 04:59 AM    AST (SGOT) 52 (H) 04/15/2018 04:59 AM    Alk.  phosphatase 139 (H) 04/15/2018 04:59 AM    Bilirubin, direct 0.1 07/07/2009 06:38 PM    Bilirubin, total 0.4 04/15/2018 04:59 AM      [facility] [address]

## 2018-04-26 NOTE — WOUND CARE
VAC with good seal and fistula effluent emptying into pouch. Decided with patient to leave system in place and re-visit tomorrow which is a potential discharge day. Called wound care nurse @ [facility] to help facilitate a smooth transition for discharge. My concerns are sending her with a moist packing dressing that will need to have NPWT upon arrival (thsi dressing will be overwhelmed VERY quickly with the high output fistula). If she transfers after 1pm on Friday, the wound care nurse @ [facility] does not return until Sunday.    Amparo Worrell, BRYANN

## 2018-04-26 NOTE — PROGRESS NOTES
followed up with Meredith from [address] [place] [address] and the authorization from [facility] is no longer valid and [facility] will re-contact [facility]. The tentative plan is for patient to be TRANSFERRED TO [address] [place] [address] WHO THEN CONTACTED Sofiya Miner AT [facility]. [facility] IS SET UP FOR 1PM .   I have spoken with surgeon, dietician and patient's nurse and will follow up in am.

## 2018-04-26 NOTE — PROGRESS NOTES
spoke with Apolinar Luque at [facility] [phone] and they have obtained an authorization for patient. She does have a wound vac in place and upon discharge would require a wet to dry dressing for transit and then have the staff at [facility] RE-START [facility]. I checked with patient's nurse [facility] and she was made aware of tentative discharge plans and I asked her to follow up with the surgery team as we could then move forward with transitions to another level of care. Patient did voice concerns about leaving today and states her mom is at work and she wanted her to be here. I will follow up later this am regarding the plan.

## 2018-04-27 PROCEDURE — 77030019952 HC CANSTR VAC ASST KCON -B

## 2018-04-27 PROCEDURE — 74011250637 HC RX REV CODE- 250/637: Performed by: NURSE PRACTITIONER

## 2018-04-27 PROCEDURE — 74011250637 HC RX REV CODE- 250/637: Performed by: PHYSICAL MEDICINE & REHABILITATION

## 2018-04-27 PROCEDURE — 74011000250 HC RX REV CODE- 250: Performed by: SURGERY

## 2018-04-27 PROCEDURE — 74011250636 HC RX REV CODE- 250/636: Performed by: SURGERY

## 2018-04-27 PROCEDURE — 74011250637 HC RX REV CODE- 250/637: Performed by: PHYSICIAN ASSISTANT

## 2018-04-27 PROCEDURE — 97535 SELF CARE MNGMENT TRAINING: CPT

## 2018-04-27 PROCEDURE — 36415 COLL VENOUS BLD VENIPUNCTURE: CPT | Performed by: SURGERY

## 2018-04-27 PROCEDURE — 77030010520

## 2018-04-27 PROCEDURE — 65270000029 HC RM PRIVATE

## 2018-04-27 PROCEDURE — 97606 NEG PRS WND THER DME>50 SQCM: CPT

## 2018-04-27 PROCEDURE — 84100 ASSAY OF PHOSPHORUS: CPT | Performed by: SURGERY

## 2018-04-27 PROCEDURE — 85027 COMPLETE CBC AUTOMATED: CPT | Performed by: SURGERY

## 2018-04-27 PROCEDURE — 80048 BASIC METABOLIC PNL TOTAL CA: CPT | Performed by: SURGERY

## 2018-04-27 PROCEDURE — 3331090001 HH PPS REVENUE CREDIT

## 2018-04-27 PROCEDURE — 77030018717 HC DRSG GRNUFM KCON -B

## 2018-04-27 PROCEDURE — 74011250637 HC RX REV CODE- 250/637: Performed by: INTERNAL MEDICINE

## 2018-04-27 PROCEDURE — 77030018798 HC PMP KT ENTRL FED COVD -A

## 2018-04-27 PROCEDURE — 3331090002 HH PPS REVENUE DEBIT

## 2018-04-27 PROCEDURE — 74011250636 HC RX REV CODE- 250/636: Performed by: NURSE PRACTITIONER

## 2018-04-27 PROCEDURE — 83735 ASSAY OF MAGNESIUM: CPT | Performed by: SURGERY

## 2018-04-27 RX ORDER — CLONAZEPAM 0.5 MG/1
0.5 TABLET ORAL
Qty: 30 TAB | Refills: 0 | Status: SHIPPED | OUTPATIENT
Start: 2018-04-27 | End: 2021-01-11 | Stop reason: SDUPTHER

## 2018-04-27 RX ORDER — MORPHINE SULFATE 10 MG/ML
5 INJECTION, SOLUTION INTRAMUSCULAR; INTRAVENOUS ONCE
Status: COMPLETED | OUTPATIENT
Start: 2018-04-27 | End: 2018-04-27

## 2018-04-27 RX ORDER — MORPHINE SULFATE 100 MG/1
100 TABLET, FILM COATED, EXTENDED RELEASE ORAL EVERY 8 HOURS
Qty: 60 TAB | Refills: 0 | Status: SHIPPED | OUTPATIENT
Start: 2018-04-27 | End: 2019-11-25 | Stop reason: CLARIF

## 2018-04-27 RX ORDER — ASPIRIN 325 MG
325 TABLET ORAL DAILY
Qty: 30 TAB | Refills: 0 | Status: SHIPPED | OUTPATIENT
Start: 2018-04-27 | End: 2019-08-14 | Stop reason: SDUPTHER

## 2018-04-27 RX ORDER — MORPHINE SULFATE 15 MG/1
45 TABLET ORAL
Qty: 60 TAB | Refills: 0 | Status: SHIPPED | OUTPATIENT
Start: 2018-04-27 | End: 2019-11-25 | Stop reason: CLARIF

## 2018-04-27 RX ADMIN — PROCHLORPERAZINE EDISYLATE 5 MG: 5 INJECTION INTRAMUSCULAR; INTRAVENOUS at 11:25

## 2018-04-27 RX ADMIN — MORPHINE SULFATE 100 MG: 100 TABLET, FILM COATED, EXTENDED RELEASE ORAL at 16:14

## 2018-04-27 RX ADMIN — MORPHINE SULFATE 115 MG: 15 TABLET, EXTENDED RELEASE ORAL at 22:58

## 2018-04-27 RX ADMIN — Medication 10 ML: at 14:30

## 2018-04-27 RX ADMIN — LORAZEPAM 1 MG: 2 INJECTION INTRAMUSCULAR; INTRAVENOUS at 00:58

## 2018-04-27 RX ADMIN — PROCHLORPERAZINE EDISYLATE 5 MG: 5 INJECTION INTRAMUSCULAR; INTRAVENOUS at 02:35

## 2018-04-27 RX ADMIN — Medication 10 ML: at 10:00

## 2018-04-27 RX ADMIN — MORPHINE SULFATE 45 MG: 15 TABLET ORAL at 05:25

## 2018-04-27 RX ADMIN — ASPIRIN 325 MG: 325 TABLET ORAL at 09:56

## 2018-04-27 RX ADMIN — Medication 10 ML: at 22:00

## 2018-04-27 RX ADMIN — Medication 10 ML: at 08:29

## 2018-04-27 RX ADMIN — MORPHINE SULFATE 45 MG: 15 TABLET ORAL at 09:56

## 2018-04-27 RX ADMIN — MORPHINE SULFATE 45 MG: 15 TABLET ORAL at 16:14

## 2018-04-27 RX ADMIN — MORPHINE SULFATE 100 MG: 100 TABLET, FILM COATED, EXTENDED RELEASE ORAL at 08:32

## 2018-04-27 RX ADMIN — MORPHINE SULFATE 5 MG: 10 INJECTION, SOLUTION INTRAMUSCULAR; INTRAVENOUS at 19:08

## 2018-04-27 RX ADMIN — MORPHINE SULFATE 30 MG: 15 TABLET ORAL at 11:26

## 2018-04-27 RX ADMIN — Medication 10 ML: at 01:01

## 2018-04-27 RX ADMIN — DRONABINOL 2.5 MG: 2.5 CAPSULE ORAL at 09:56

## 2018-04-27 RX ADMIN — MORPHINE SULFATE 45 MG: 15 TABLET ORAL at 21:10

## 2018-04-27 RX ADMIN — LORAZEPAM 1 MG: 2 INJECTION INTRAMUSCULAR; INTRAVENOUS at 18:50

## 2018-04-27 RX ADMIN — Medication 10 ML: at 21:10

## 2018-04-27 RX ADMIN — SODIUM CHLORIDE, SODIUM LACTATE, POTASSIUM CHLORIDE, AND CALCIUM CHLORIDE 50 ML/HR: 600; 310; 30; 20 INJECTION, SOLUTION INTRAVENOUS at 02:42

## 2018-04-27 RX ADMIN — WATER 1 MG: 1 INJECTION INTRAMUSCULAR; INTRAVENOUS; SUBCUTANEOUS at 06:50

## 2018-04-27 RX ADMIN — MORPHINE SULFATE 45 MG: 15 TABLET ORAL at 14:29

## 2018-04-27 RX ADMIN — MORPHINE SULFATE 45 MG: 15 TABLET ORAL at 00:50

## 2018-04-27 RX ADMIN — DRONABINOL 2.5 MG: 2.5 CAPSULE ORAL at 18:40

## 2018-04-27 RX ADMIN — ENOXAPARIN SODIUM 40 MG: 100 INJECTION SUBCUTANEOUS at 09:56

## 2018-04-27 RX ADMIN — PROCHLORPERAZINE EDISYLATE 5 MG: 5 INJECTION INTRAMUSCULAR; INTRAVENOUS at 18:50

## 2018-04-27 NOTE — PROGRESS NOTES
Follow up visit with [place] and her mother who was visiting her. Pt was tearful at times during our visit, expressing some grief around her medical issues and some concerns about the future. She shared that she is trying to just take it a day at a time, but is weary from the challenges she has faced and continues to face. Offered listening presence and emotional support. Normalized her emotions and attempted to gently explore sources of pat and strength -[place] was not able to articulate any at this time. Pt accepted my offer of prayer. Assurance of prayers offered as pt may be leaving the hospital over the next few days. Chaplains are available for continued support as needed; please page at [phone].     Bhumika Erwin, Palliative

## 2018-04-27 NOTE — PROGRESS NOTES
Unable to obtain labs via patients PICC line this morning. . Unable to flush either port. No blood return. Tried repositioning patients arm. Unsuccessful. Cathflo given at 0700. Cathflo removal scheduled for 0830. Report given to oncoming nurse regarding Cathflo and ordered labs. Patient lying in bed with mother at bedside.

## 2018-04-27 NOTE — PROGRESS NOTES
followed up at 8:30 with [address] from [address] [place] [address] and per our conversation they still need a new authorization for patient to go to acute rehab hospital.  The transfer will be delayed per liason until Monday April 30th. I discussed this with Dr Susana Palacio and Albina Gibbs NP and the wound care nurse Yung Ramos will see patient later this am.  Patient is aware of the change of plans and will hopefully plan on discharge on Monday. I have cancelled transportation with [facility]. Patient's nurse Tre Contreras has made patient of aware of change of plans at this time.

## 2018-04-27 NOTE — PROGRESS NOTES
Problem: Self Care Deficits Care Plan (Adult)  Goal: *Acute Goals and Plan of Care (Insert Text)  Occupational Therapy Goals  Revised 4/27/18  1. Patient will complete bathing upper body to knees with setup within 7 days upgraded 4/27  2. Patient will complete bariatric BSC transfer with supervision bariatric RW within 7 days. Upgraded 4/27  3. Patient will complete lower body dressing activities with supervision within 7 days using AE  4. Patient will perform standing for clothing management tasks (toileting or dressing) supervision bariatric RW within 7 days. Upgraded 4/27  5. Patient will complete light resistance band UE exercise program independently within 7 days. Upgraded 4/27/18    Reevaluated 4/20/2018  1. Patient will complete grooming activity sitting EOB without support for 10 minutes within 7 days  2. Patient will complete BSC transfer with min A x 1 persons within 7 days. 3. Patient will complete lower body dressing activities with supervision within 7 days using AE  4. Patient will perform standing for clothing management tasks (toileting or dressing) with min A x 1 persons within 7 days. 5. Patient will complete UE exercise program independently within 7 days. Reevaluated 4/10/2018  1. Patient will complete grooming activity sitting EOB without support for 10 minutes within 7 days. 2.  Patient will complete BSC transfer with min A x 2 persons within 7 days. 3.  Patient will complete lower body dressing activities with min A within 7 days. 4.  Patient will perform standing for clothing management tasks (toileting or dressing) with min A x 2 persons within 7 days. 5.  Patient will complete UE exercise program independently within 7 days. Initiated 4/4/2018  1. Patient will perform rolling and pulling self up to head of bed with min assist to assist with ADL's at bed level within 7 day(s).   2.  Patient will perform static sitting edge of bed > or = 10 minutes with supervision/set-up within 7 day(s). 3.  Patient will perform static standing with bilateral UE support on rolling walker > or = 3 minutes with minimal assistance/contact guard assist x's 2 within 7 day(s). 4.  Patient will perform toilet transfers with moderate assistance x's 2 to bedside commode within 7 days. 5.  Patient will perform bilateral UE AROM and strengthening exercises throughout day and grade exercises prn to increase demand within 7 day(s). Occupational Therapy TREATMENT: WEEKLY REASSESSMENT  Patient: Kymberly Bolaños (38 y.o. female)  Date: 4/27/2018  Diagnosis: FISTULA  Small bowel fistula  INTEROCUTANOUS FISTULA <principal problem not specified>  Procedure(s) (LRB):  OPEN JEJUNOSTOMY TUBE PLACEMENT UNDER FLURO/ WOUND VAC CHANGE. (N/A) 15 Days Post-Op  Precautions: Fall, Skin, Contact  Chart, occupational therapy assessment, plan of care, and goals were reviewed. ASSESSMENT:  Patient progressing with all goals, goals upgraded. Upper body ADLs overall independent to moderate A, lower body ADLs overall moderate to total A, sit<>stand min A bariatric RW, and standing tolerance 5 mins. ADLs limited by pain management (back, abdomen), anxiety (in general and leakage of ostomy bag), fear of falling (fell when stepping on scale), standing tolerance, standing balance, ROM, strength, and overall endurance. Patient can tolerate 3 hours of therapy, is not safe to discharge home at this time , family is very supportive and advocate for her needs, and requires medical management. Patient requires consistent therapist, external cues for the appropriate job well done, and small achievable goals in order to progress patient to independence. Recommend inpatient rehab. Recommend with nursing patient to complete as able in order to maintain strength, endurance and independence: ADLs with supervision/setup, OOB to chair 3x/day and mobilizing to the [facility] for toileting with 1 assist bariatric RW. Thank you for your assistance. Progression toward goals:  [x]            Improving appropriately and progressing toward goals  []            Improving slowly and progressing toward goals  []            Not making progress toward goals and plan of care will be adjusted     PLAN:  Goals have been updated based on progression since last assessment. Patient continues to benefit from skilled intervention to address the above impairments. Continue to follow patient 3 times a week to address goals. Planned Interventions:  [x]                    Self Care Training                  [x]             Therapeutic Activities  [x]                    Functional Mobility Training    []             Cognitive Retraining  [x]                    Therapeutic Exercises           [x]             Endurance Activities  [x]                    Balance Training                   []             Neuromuscular Re-Education  []                    Visual/Perceptual Training     [x]        Home Safety Training  [x]                    Patient Education                 [x]             Family Training/Education  []                    Other (comment):  Discharge Recommendations: Rehab  Further Equipment Recommendations for Discharge: TBD     SUBJECTIVE:   Patient stated I can't!    OBJECTIVE DATA SUMMARY:   Cognitive/Behavioral Status:         Alert, oriented x4, crying throughout the session when anxious about completing a task             Functional Mobility and Transfers for ADLs:  Bed Mobility:   Bed in chair position to stand from it in order to step on scale. Bed then flattened, functional mobility around to the right side of the bed to then sit down and lie down. Family A with B LEs. Declined log rolling for back and abdomen pain. Transfers:  Sit to Stand: Minimum assistance (bariatric RW, cues that she can complete on own.  Afterwards instruction to patient (and family) that she physically stood, stepped up and off scale, walked around bed, and stood 5 mins without physical A. This is great progress.) instruction throughout the session to patient and family to allow patient time to try task, to complete task on own without hands on her. Functional Transfers  Bathroom Mobility: Minimum assistance  Toilet Transfer : Minimum assistance (bariatric BSC for urinating)        Balance:  Sitting: Intact; Without support  Standing: Impaired; Without support (one hand on supportive surface)  Standing - Static: Fair  Standing - Dynamic : Fair    ADL Intervention:  Feeding  Feeding Assistance: Independent            Patient stating at 11:30 in the middle of wound care, upon return with PTA patient still with wound care at 1:30. Upon return at 2:30 patient pleased with the timing 2* wound care completed, she was receiving Ativan from the nurse, had lunch and therefore was ready to work with therapy. Mother stating that she has to complete the scale but fearful of doing so 2* fall. Spoke with PTA who confirmed the routine of standing. Patient completed the standing with one A, mother holding onto the patient for her \"anxiety\". However, later patient screaming at everyone to back off from her space (no one was around her). Clothing management: screaming: \"Move my gown! \". Mother moved patient's gown. Once patient standing with RW, then pointing to how she would like the items in the room placed; instruction to patient as to what a great job she did letting go of the walker for so long recommend her mobilizing her own gown. Patient then demonstrated adjusting both sides of her gown without A prior to sitting down. Instruction to all on the benefits of patient completing tasks to maintain endurance, strength and progress independence. Lower Body Dressing Assistance  Socks: Total assistance (dependent)  Slip on Shoes with Back: Total assistance (dependent)              Neuro Re-Education:           Therapeutic Exercises:   Encouraged to complete daily.  Instruction while sitting unsupported to sit with completely neutral spine stretch and hold 10 seconds each 3 reps with supervision encouragement to complete through uncomfortable not sharp pain; trunk lateralization L and R with patient then crying. However, when scooting back in bed patient turned L without pain and crying at that moment. Pain:  Pain Scale 1: Numeric (0 - 10)  Pain Intensity 1: 7  Pain Location 1: Abdomen  Pain Orientation 1: Lower  Pain Description 1: Sharp; Stabbing  Pain Intervention(s) 1: Medication (see MAR)  Activity Tolerance:   Vitals:    04/27/18 0820 04/27/18 0914 04/27/18 0954 04/27/18 1508   BP: 112/53 (!) 81/48 92/61 (!) 88/62   BP 1 Location: Left arm Left arm Left arm    BP Patient Position: At rest;Head of bed elevated (Comment degrees)  Comment: 20 At rest;Head of bed elevated (Comment degrees) At rest;Head of bed elevated (Comment degrees)  Comment: 20    Pulse: (!) 106 (!) 110 (!) 103 (!) 115   Resp: 16 16  18   Temp: 98.7 °F (37.1 °C) 98.8 °F (37.1 °C)  99 °F (37.2 °C)   SpO2: 98% 97%  93%   Weight:       Height:           Please refer to the flowsheet for vital signs taken during this treatment.   After treatment:   [] Patient left in no apparent distress sitting up in chair  [x] Patient left in no apparent distress in bed  [x] Call bell left within reach  [x] Nursing notified  [x] Caregiver present  [] Bed alarm activated    COMMUNICATION/COLLABORATION:   The patients plan of care was discussed with: Physical Therapy Assistant and Registered Nurse    Paul Meneses  Time Calculation: 40 mins  Plus 20 min discussion

## 2018-04-27 NOTE — PROGRESS NOTES
Physial Therapy    Reviewed chart and attempted to treat pt x2. In the AM pt requesting to defer due to \"leaking\" Pt is awaiting wound care at this time. Followed up with pt. Gerard price RN was just beginning with take down. She reports it will be approximately 40 min. Will defer at this time and continue to follow as time allows.

## 2018-04-27 NOTE — WOUND CARE
WOCN Note:     Follow-up visit for [facility] dressing change and fistula management. Chart shows:  Admitted for fistula takdown 3/7/18 by Dr. Tosin Bejarano with [facility] placement in [address] [place] [address]; history of multiple abdominal surgeries and Crohns disease. Admitted from home. Mother able to provide assistance in pouching prior to admission and became very competent in doing so.       Assessment:   Patient is A&O x 4, continent and mobile - moves around room with assistance & has been working with PT. Jessa Cat  Bed: total care bariatric  Pre-medicated by RN.      1. Midline abdominal surgical wound = 15 x 12 x 1.8 cm (Smaller)  75% pink/red granular wound base and 25% stomatized mucus membrane centrally with  peristalsis and output @ 4 o'clock. Red rubber cath in distal limb of stoma for feedings and secured to abdomen. 1 piece of black sponge with central hole to allow for pouching of the fistulas; 3 Hawk's rings placed around hole top and bottom to achieve seal.  After seal achieved, a 2-piece pouch was placed to collect fistula output with red rubber brought out of pouch. 50 mmHg continuous suction.          Colostomy: stoma is moist & pink in LLQ; peristomal skin with mucosal transplantation noted; mucoid tan output; 2-piece flat pouch applied.       Wound Recommendations:    Maintain VAC as ordered @ 50mmHg continuous suction and empty pouch often to keep empty. Discussed above plan with patient & RN. Transition of Care: Plan is to go to [facility] Monday and continue VAC therapy while there and then transition to pouching only when she goes home.      SONY Gonsales, RN, [employer]  Certified Wound, Ostomy, Continence Nurse  office [phone]  pager [phone] or call  to page

## 2018-04-27 NOTE — PROGRESS NOTES
Progress Note    Patient: Landon Schaffer MRN: [id number]  SSN: [id number]    YOB: 1975  Age: 38 y.o. Sex: female      Admit Date: 3/7/2018    15 Days Post-Op    Procedure:  Procedure(s):  OPEN JEJUNOSTOMY TUBE PLACEMENT UNDER FLURO/ WOUND VAC CHANGE. Subjective:     No acute surgical issues. Pt tolerating diet. Wound appliance with leak. Pt is suppose to be discharged today to [place] [address]; however, [facility] insurance unable to provide authorization till Monday per case management. Pain is under control. Objective:     Visit Vitals    BP (!) 81/48 (BP 1 Location: Left arm, BP Patient Position: At rest;Head of bed elevated (Comment degrees))    Pulse (!) 110    Temp 98.8 °F (37.1 °C)    Resp 16    Ht 5' 4\" (1.626 m)    Wt 300 lb 3.2 oz (136.2 kg)    SpO2 97%    BMI 51.53 kg/m2       Temp (24hrs), Av.7 °F (37.1 °C), Min:98 °F (36.7 °C), Max:99.1 °F (37.3 °C)        Physical Exam:    Gen:  NAD  Pulm:  Unlabored  Abd:  S/ND/appropriate TTP  Wound with drainage from fistula:   Edema on left flank    No results found for this or any previous visit (from the past 24 hour(s)). Assessment:     Hospital Problems  Date Reviewed: 10/27/2017          Codes Class Noted POA    Small bowel fistula ICD-10-CM: K63.2  ICD-9-CM: 569.81  3/7/2018 Unknown              Plan/Recommendations/Medical Decision Making:     - Continue local wound care  - Pain control  - Diet as tolerated  - [facility] be [facility]   Possible DC to Rehab Monday pending insurance approval  - Continue tubefeeding and advance as tolerated    Signed By: Anu Moise MD     2018

## 2018-04-27 NOTE — DISCHARGE INSTRUCTIONS
DISCHARGE SUMMARY from Nurse    PATIENT INSTRUCTIONS:    After general anesthesia or intravenous sedation, for 24 hours or while taking prescription Narcotics:  · Limit your activities  · Do not drive and operate hazardous machinery  · Do not make important personal or business decisions  · Do  not drink alcoholic beverages  · If you have not urinated within 8 hours after discharge, please contact your surgeon on call. Report the following to your surgeon:  · Excessive pain, swelling, redness or odor of or around the surgical area  · Temperature over 100.5  · Nausea and vomiting lasting longer than 4 hours or if unable to take medications  · Any signs of decreased circulation or nerve impairment to extremity: change in color, persistent  numbness, tingling, coldness or increase pain  · Any questions    What to do at Home:  Recommended activity: {discharge activity:20934}, ***    If you experience any of the following symptoms ***, please follow up with ***. *  Please give a list of your current medications to your Primary Care Provider. *  Please update this list whenever your medications are discontinued, doses are      changed, or new medications (including over-the-counter products) are added. *  Please carry medication information at all times in case of emergency situations. These are general instructions for a healthy lifestyle:    No smoking/ No tobacco products/ Avoid exposure to second hand smoke  Surgeon General's Warning:  Quitting smoking now greatly reduces serious risk to your health.     Obesity, smoking, and sedentary lifestyle greatly increases your risk for illness    A healthy diet, regular physical exercise & weight monitoring are important for maintaining a healthy lifestyle    You may be retaining fluid if you have a history of heart failure or if you experience any of the following symptoms:  Weight gain of 3 pounds or more overnight or 5 pounds in a week, increased swelling in our hands or feet or shortness of breath while lying flat in bed. Please call your doctor as soon as you notice any of these symptoms; do not wait until your next office visit. Recognize signs and symptoms of STROKE:    F-face looks uneven    A-arms unable to move or move unevenly    S-speech slurred or non-existent    T-time-call 911 as soon as signs and symptoms begin-DO NOT go       Back to bed or wait to see if you get better-TIME IS BRAIN. Warning Signs of HEART ATTACK     Call 911 if you have these symptoms:   Chest discomfort. Most heart attacks involve discomfort in the center of the chest that lasts more than a few minutes, or that goes away and comes back. It can feel like uncomfortable pressure, squeezing, fullness, or pain.  Discomfort in other areas of the upper body. Symptoms can include pain or discomfort in one or both arms, the back, neck, jaw, or stomach.  Shortness of breath with or without chest discomfort.  Other signs may include breaking out in a cold sweat, nausea, or lightheadedness. Don't wait more than five minutes to call 911 - MINUTES MATTER! Fast action can save your life. Calling 911 is almost always the fastest way to get lifesaving treatment. Emergency Medical Services staff can begin treatment when they arrive -- up to an hour sooner than if someone gets to the hospital by car. The discharge information has been reviewed with the {PATIENT PARENT GUARDIAN:00970}. The {PATIENT PARENT GUARDIAN:73493} verbalized understanding. Discharge medications reviewed with the {Dishcarge meds reviewed VUQO:41805} and appropriate educational materials and side effects teaching were provided. ___________________________________________________________________________________________________________________________________1. Do not drive while on pain medication. You should take a stool softener while on pain medication to prevent constipation.   2.  Do not lift anything greater than 10 pounds for 2 weeks. 3.  You may resume your home medications. 4.  You may shower but do not swim or soak in tub for 4 weeks. 5.  Please call [phone] to schedule a follow-up with Dr. Amando Garcia within 3 weeks. Abdominal Pain: Care Instructions  Your Care Instructions    Abdominal pain has many possible causes. Some aren't serious and get better on their own in a few days. Others need more testing and treatment. If your pain continues or gets worse, you need to be rechecked and may need more tests to find out what is wrong. You may need surgery to correct the problem. Don't ignore new symptoms, such as fever, nausea and vomiting, urination problems, pain that gets worse, and dizziness. These may be signs of a more serious problem. Your doctor may have recommended a follow-up visit in the next 8 to 12 hours. If you are not getting better, you may need more tests or treatment. The doctor has checked you carefully, but problems can develop later. If you notice any problems or new symptoms, get medical treatment right away. Follow-up care is a key part of your treatment and safety. Be sure to make and go to all appointments, and call your doctor if you are having problems. It's also a good idea to know your test results and keep a list of the medicines you take. How can you care for yourself at home? · Rest until you feel better. · To prevent dehydration, drink plenty of fluids, enough so that your urine is light yellow or clear like water. Choose water and other caffeine-free clear liquids until you feel better. If you have kidney, heart, or liver disease and have to limit fluids, talk with your doctor before you increase the amount of fluids you drink. · If your stomach is upset, eat mild foods, such as rice, dry toast or crackers, bananas, and applesauce. Try eating several small meals instead of two or three large ones.   · Wait until 48 hours after all symptoms have gone away before you have spicy foods, alcohol, and drinks that contain caffeine. · Do not eat foods that are high in fat. · Avoid anti-inflammatory medicines such as aspirin, ibuprofen (Advil, Motrin), and naproxen (Aleve). These can cause stomach upset. Talk to your doctor if you take daily aspirin for another health problem. When should you call for help? Call 911 anytime you think you may need emergency care. For example, call if:  ? · You passed out (lost consciousness). ? · You pass maroon or very bloody stools. ? · You vomit blood or what looks like coffee grounds. ? · You have new, severe belly pain. ?Call your doctor now or seek immediate medical care if:  ? · Your pain gets worse, especially if it becomes focused in one area of your belly. ? · You have a new or higher fever. ? · Your stools are black and look like tar, or they have streaks of blood. ? · You have unexpected vaginal bleeding. ? · You have symptoms of a urinary tract infection. These may include:  ¨ Pain when you urinate. ¨ Urinating more often than usual.  ¨ Blood in your urine. ? · You are dizzy or lightheaded, or you feel like you may faint. ? Watch closely for changes in your health, and be sure to contact your doctor if:  ? · You are not getting better after 1 day (24 hours). Where can you learn more? Go to http://cornell-terrell.info/. Enter I348 in the search box to learn more about \"Abdominal Pain: Care Instructions. \"  Current as of: March 20, 2017  Content Version: 11.4  © 9270-1782 [employer]. Care instructions adapted under license by [employer] (which disclaims liability or warranty for this information). If you have questions about a medical condition or this instruction, always ask your healthcare professional. [address] any warranty or liability for your use of this information.        Learning About Stoma Reversal Surgery  What is a stoma reversal?  A stoma reversal is surgery to attach your bowel together after a colostomy or ileostomy (also called ostomies). During ostomy surgery, the bowel was  and attached to an opening made in the skin of your belly. The opening is called a stoma. Stool passes through the stoma and out of your body. Ostomy surgery can be permanent or temporary. It depends on the reason for the surgery. A stoma reversal can be done if there is a large enough section of healthy bowel left to be rejoined. A temporary ostomy may be used for certain health problems. These include problems such as bowel cancer, ulcerative colitis, Crohn's disease, or bowel injuries. How is a stoma reversal done? A stoma reversal is done after the original surgery has healed. The doctor rejoins the ends of the bowel that were . The bowel is stitched or stapled back together. The part of the belly where the stoma was is then closed with stitches. How the stoma reversal is done depends on what type of ostomy surgery you had. One type involves making a large cut (incision). This way takes longer to heal. The other type uses smaller cuts. It doesn't take as long to heal.  When is a stoma reversal done? The stoma is closed after you've healed from the original surgery. This most often takes at least 6 to 8 weeks. But in some cases it can take up to 12 months. Your bowel and anal muscles need to be working for the reversal to work well. What can you expect after a stoma reversal?  It's common to have problems with how the bowel works after a stoma reversal. This is because part of the bowel has been removed. You may have symptoms such as loose stool, incontinence, sudden bowel urges, and pain. Other risks include infection in the belly and blockage or scar tissue in the bowel. You may have the same precautions you had after your ostomy. Your doctor will want you to avoid bending, heavy lifting, and other strenuous activities.  Your doctor can tell you when it's okay to return to your activities and routines, such as driving. This may take up to several weeks or months. Caring for yourself at home  Your doctor may recommend things you can do at home to help improve how your bowel works. You may be told to:  · Change your diet. · Eat small, frequent meals. · Drink plenty of fluids. · Keep a food diary. You can use it to track what you eat and how it affects you. As your bowel heals, you may work with a dietitian to know what foods are best. Your doctor may recommend walking or doing pelvic floor exercises. They may help improve your bowel function. You also may take medicines for diarrhea or use creams to help with soreness. Coping with bowel problems  Dealing with bowel problems can be hard. Many people feel embarrassed or frustrated at times. But your care team can help. You can talk with your doctor or other members of your care team about these issues. They can help you seek support and learn ways to cope. Follow-up care is a key part of your treatment and safety. Be sure to make and go to all appointments, and call your doctor if you are having problems. It's also a good idea to know your test results and keep a list of the medicines you take. Where can you learn more? Go to http://cornell-terrell.info/. Enter [id number] in the search box to learn more about \"Learning About Stoma Reversal Surgery. \"  Current as of: May 12, 2017  Content Version: 11.4  © 6568-7115 [employer]. Care instructions adapted under license by [employer] (which disclaims liability or warranty for this information). If you have questions about a medical condition or this instruction, always ask your healthcare professional. [address] any warranty or liability for your use of this information. [employer] Activation    Thank you for requesting access to [employer].  Please follow the instructions below to securely access and download your online medical record. [employer] allows you to send messages to your doctor, view your test results, renew your prescriptions, schedule appointments, and more. How Do I Sign Up? 1. In your internet browser, go to [email or web address]. 2. Click on the First Time User? Click Here link in the Sign In box. You will see the New Member Sign Up page. 3. Enter your [employer] Access Code exactly as it appears below. You will not need to use this code after youve completed the sign-up process. If you do not sign up before the expiration date, you must request a new code. [employer] Access Code: [email or web address]  Expires: 2018  8:40 PM (This is the date your [employer] access code will )    4. Enter the last four digits of your Social Security Number (xxxx) and Date of Birth (mm/dd/yyyy) as indicated and click Submit. You will be taken to the next sign-up page. 5. Create a [employer] ID. This will be your [employer] login ID and cannot be changed, so think of one that is secure and easy to remember. 6. Create a [employer] password. You can change your password at any time. 7. Enter your Password Reset Question and Answer. This can be used at a later time if you forget your password. 8. Enter your e-mail address. You will receive e-mail notification when new information is available in [address] 9. Click Sign Up. You can now view and download portions of your medical record. 10. Click the Download Summary menu link to download a portable copy of your medical information. Additional Information    If you have questions, please visit the Frequently Asked Questions section of the [employer] website at [email or web address] Remember, [employer] is NOT to be used for urgent needs. For medical emergencies, dial 911.

## 2018-04-27 NOTE — PROGRESS NOTES
1855: Pt complaining of pain uncontrolled by PO pain medication regimen ever since 3rd dressing change. Pt suspects high frequency wound care today has left her with higher than normal pain. Per Dr. Anna Encarnacion, ok to give one time dose 5mg IV Morphine for breakthrough pain. Bedside shift change report given to Corinne (oncoming nurse) by Oly Sherwood (offgoing nurse). Report included the following information SBAR, Kardex, Intake/Output, MAR and Recent Results.

## 2018-04-27 NOTE — PROGRESS NOTES
NUTRITION       Recommendations:  1. Continue to advance tube feedings towards continuous goal of 60 mL/hr + 50 mL flush q 4 hours throughout the day + 1 pkt liquid Prosouce daily + 50 mL flush q 4 hours throughout the day  -- If pt tolerates well over the weekend, would increase to 72 mL/hr x 20 hours (6560-2791) + 1 pkt liquid Prosource daily + 50 mL flush q 4 hours    2. Daily weights (pt agreeable to standing scale weight today when up with PT-- last standing wt is from 4/13)    3. Continue to document strict I/O's (po intake, fluid intake as well as tube feeding and flush volumes)    4. May still consider UUN (w/24 hour urine) to assess absorption    Brief note. Chart reviewed, discussed with RN. Pt's tube feeding remains at 20 mL/hr. No issues with tolerance at this time. Output in ileostomy remains minimal (estimated ~30 mL). RN to advance this morning to 30 mL/hr. Current goal is to advance 10 mL/hr q 8 hours to goal of 70 mL/hr. Will adjust slightly to 60 mL/hr x 24 hours and, if she tolerates this over the weekend, would increase to 72 mL/hr x 20 hours to allow 4 hours off the pump during the day at rehab. Goal as above will provide 2220 kcal, 113 g protein and 1410 mL total free water (tf + flush)-- meeting 100% of estimated needs; however, true absorption is unclear. Plans per surgery to continue with [facility] Soft diet and ongoing tube feedings on discharge.       Fistula drain output yesterday: 4525 mL     Patient Vitals for the past 100 hrs:   % Diet Eaten   04/27/18 0820 0 %   04/26/18 1948 100 %   04/26/18 1400 75 %   04/26/18 0800 75 %   04/25/18 1107 75 %   04/25/18 0906 0 %   04/24/18 1600 70 %   04/23/18 0904 0 %     Estimated Nutrition Needs:   Kcals/day: 1649 Kcals/day (4707-2221 kcal/day (11-15 kcal/kg))  Protein: 120 g (120-150 kcal/day (0.8-1 g/kg))  Fluid: 2250 ml (or 1 mL/kcal or per output)     Based On: Kcal/kg - specify (Comment)  Weight Used: Actual wt (149.9 kg)    RD to follow.     [address]

## 2018-04-28 LAB
ANION GAP SERPL CALC-SCNC: 8 MMOL/L (ref 5–15)
BUN SERPL-MCNC: 5 MG/DL (ref 6–20)
BUN/CREAT SERPL: 4 (ref 12–20)
CALCIUM SERPL-MCNC: 8.5 MG/DL (ref 8.5–10.1)
CHLORIDE SERPL-SCNC: 97 MMOL/L (ref 97–108)
CO2 SERPL-SCNC: 31 MMOL/L (ref 21–32)
CREAT SERPL-MCNC: 1.16 MG/DL (ref 0.55–1.02)
ERYTHROCYTE [DISTWIDTH] IN BLOOD BY AUTOMATED COUNT: 17.2 % (ref 11.5–14.5)
GLUCOSE SERPL-MCNC: 107 MG/DL (ref 65–100)
HCT VFR BLD AUTO: 29.3 % (ref 35–47)
HGB BLD-MCNC: 9.1 G/DL (ref 11.5–16)
MAGNESIUM SERPL-MCNC: 1.8 MG/DL (ref 1.6–2.4)
MCH RBC QN AUTO: 29.2 PG (ref 26–34)
MCHC RBC AUTO-ENTMCNC: 31.1 G/DL (ref 30–36.5)
MCV RBC AUTO: 93.9 FL (ref 80–99)
NRBC # BLD: 0 K/UL (ref 0–0.01)
NRBC BLD-RTO: 0 PER 100 WBC
PHOSPHATE SERPL-MCNC: 4.9 MG/DL (ref 2.6–4.7)
PLATELET # BLD AUTO: 344 K/UL (ref 150–400)
PMV BLD AUTO: 9.1 FL (ref 8.9–12.9)
POTASSIUM SERPL-SCNC: 3.4 MMOL/L (ref 3.5–5.1)
RBC # BLD AUTO: 3.12 M/UL (ref 3.8–5.2)
SODIUM SERPL-SCNC: 136 MMOL/L (ref 136–145)
WBC # BLD AUTO: 11.7 K/UL (ref 3.6–11)

## 2018-04-28 PROCEDURE — 3331090001 HH PPS REVENUE CREDIT

## 2018-04-28 PROCEDURE — 65270000029 HC RM PRIVATE

## 2018-04-28 PROCEDURE — 74011250637 HC RX REV CODE- 250/637: Performed by: NURSE PRACTITIONER

## 2018-04-28 PROCEDURE — 74011250636 HC RX REV CODE- 250/636: Performed by: NURSE PRACTITIONER

## 2018-04-28 PROCEDURE — 74011250636 HC RX REV CODE- 250/636: Performed by: SURGERY

## 2018-04-28 PROCEDURE — 74011250637 HC RX REV CODE- 250/637: Performed by: INTERNAL MEDICINE

## 2018-04-28 PROCEDURE — 74011250637 HC RX REV CODE- 250/637: Performed by: PHYSICAL MEDICINE & REHABILITATION

## 2018-04-28 PROCEDURE — 3331090002 HH PPS REVENUE DEBIT

## 2018-04-28 PROCEDURE — 74011250637 HC RX REV CODE- 250/637: Performed by: PHYSICIAN ASSISTANT

## 2018-04-28 RX ADMIN — MORPHINE SULFATE 45 MG: 15 TABLET ORAL at 13:17

## 2018-04-28 RX ADMIN — LORAZEPAM 1 MG: 2 INJECTION INTRAMUSCULAR; INTRAVENOUS at 08:29

## 2018-04-28 RX ADMIN — MORPHINE SULFATE 45 MG: 15 TABLET ORAL at 21:07

## 2018-04-28 RX ADMIN — Medication 10 ML: at 21:07

## 2018-04-28 RX ADMIN — ASPIRIN 325 MG: 325 TABLET ORAL at 09:50

## 2018-04-28 RX ADMIN — PROCHLORPERAZINE EDISYLATE 5 MG: 5 INJECTION INTRAMUSCULAR; INTRAVENOUS at 04:18

## 2018-04-28 RX ADMIN — PROCHLORPERAZINE EDISYLATE 5 MG: 5 INJECTION INTRAMUSCULAR; INTRAVENOUS at 21:07

## 2018-04-28 RX ADMIN — DRONABINOL 2.5 MG: 2.5 CAPSULE ORAL at 09:50

## 2018-04-28 RX ADMIN — MORPHINE SULFATE 45 MG: 15 TABLET ORAL at 06:19

## 2018-04-28 RX ADMIN — MORPHINE SULFATE 100 MG: 100 TABLET, FILM COATED, EXTENDED RELEASE ORAL at 08:20

## 2018-04-28 RX ADMIN — Medication 10 ML: at 11:43

## 2018-04-28 RX ADMIN — MORPHINE SULFATE 115 MG: 15 TABLET, EXTENDED RELEASE ORAL at 23:10

## 2018-04-28 RX ADMIN — ONDANSETRON HYDROCHLORIDE 4 MG: 2 INJECTION INTRAMUSCULAR; INTRAVENOUS at 18:18

## 2018-04-28 RX ADMIN — MORPHINE SULFATE 45 MG: 15 TABLET ORAL at 09:50

## 2018-04-28 RX ADMIN — MORPHINE SULFATE 45 MG: 15 TABLET ORAL at 01:37

## 2018-04-28 RX ADMIN — Medication 10 ML: at 05:45

## 2018-04-28 RX ADMIN — MORPHINE SULFATE 45 MG: 15 TABLET ORAL at 17:12

## 2018-04-28 RX ADMIN — ONDANSETRON HYDROCHLORIDE 4 MG: 2 INJECTION INTRAMUSCULAR; INTRAVENOUS at 14:06

## 2018-04-28 RX ADMIN — DRONABINOL 2.5 MG: 2.5 CAPSULE ORAL at 18:11

## 2018-04-28 RX ADMIN — MORPHINE SULFATE 100 MG: 100 TABLET, FILM COATED, EXTENDED RELEASE ORAL at 16:09

## 2018-04-28 RX ADMIN — LORAZEPAM 1 MG: 2 INJECTION INTRAMUSCULAR; INTRAVENOUS at 23:10

## 2018-04-28 RX ADMIN — ENOXAPARIN SODIUM 40 MG: 100 INJECTION SUBCUTANEOUS at 09:51

## 2018-04-28 NOTE — PROGRESS NOTES
Bedside shift change report given to [address] (oncoming nurse) by Linda Ford (offgoing nurse). Report included the following information SBAR, Kardex, Intake/Output and MAR.

## 2018-04-28 NOTE — PROGRESS NOTES
Progress Note    Patient: Kb Parker MRN: [id number]  SSN: [id number]    YOB: 1975  Age: 38 y.o. Sex: female      Admit Date: 3/7/2018    16 Days Post-Op    Procedure:  Procedure(s):  OPEN JEJUNOSTOMY TUBE PLACEMENT UNDER FLURO/ WOUND VAC CHANGE. Subjective:     Patient has no new complaints. Awaiting insurance clearance for transfer to rehab. Tolerating diet and tube feeds. Objective:     Visit Vitals    /68    Pulse 100    Temp 98.3 °F (36.8 °C)    Resp 20    Ht 5' 4\" (1.626 m)    Wt 294 lb 12.1 oz (133.7 kg)    SpO2 97%    BMI 50.59 kg/m2       Temp (24hrs), Av °F (37.2 °C), Min:98.3 °F (36.8 °C), Max:99.6 °F (37.6 °C)      Physical Exam:    gen- Alert in NAD  Abd- S/nt  Vac and dressing intact      Data Review: images and reports reviewed    Lab Review: All lab results for the last 24 hours reviewed. No results found for this or any previous visit (from the past 24 hour(s)). 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      Signed By: Ca Davis MD     2018

## 2018-04-29 LAB
ANION GAP SERPL CALC-SCNC: 11 MMOL/L (ref 5–15)
BUN SERPL-MCNC: 8 MG/DL (ref 6–20)
BUN/CREAT SERPL: 6 (ref 12–20)
CALCIUM SERPL-MCNC: 8.6 MG/DL (ref 8.5–10.1)
CHLORIDE SERPL-SCNC: 92 MMOL/L (ref 97–108)
CO2 SERPL-SCNC: 32 MMOL/L (ref 21–32)
CREAT SERPL-MCNC: 1.3 MG/DL (ref 0.55–1.02)
ERYTHROCYTE [DISTWIDTH] IN BLOOD BY AUTOMATED COUNT: 17.2 % (ref 11.5–14.5)
GLUCOSE SERPL-MCNC: 95 MG/DL (ref 65–100)
HCT VFR BLD AUTO: 30.3 % (ref 35–47)
HGB BLD-MCNC: 9.5 G/DL (ref 11.5–16)
MAGNESIUM SERPL-MCNC: 1.7 MG/DL (ref 1.6–2.4)
MCH RBC QN AUTO: 29 PG (ref 26–34)
MCHC RBC AUTO-ENTMCNC: 31.4 G/DL (ref 30–36.5)
MCV RBC AUTO: 92.4 FL (ref 80–99)
NRBC # BLD: 0 K/UL (ref 0–0.01)
NRBC BLD-RTO: 0 PER 100 WBC
PHOSPHATE SERPL-MCNC: 4.3 MG/DL (ref 2.6–4.7)
PLATELET # BLD AUTO: 360 K/UL (ref 150–400)
PMV BLD AUTO: 9.8 FL (ref 8.9–12.9)
POTASSIUM SERPL-SCNC: 3.3 MMOL/L (ref 3.5–5.1)
RBC # BLD AUTO: 3.28 M/UL (ref 3.8–5.2)
SODIUM SERPL-SCNC: 135 MMOL/L (ref 136–145)
WBC # BLD AUTO: 13.7 K/UL (ref 3.6–11)

## 2018-04-29 PROCEDURE — 3331090002 HH PPS REVENUE DEBIT

## 2018-04-29 PROCEDURE — 83735 ASSAY OF MAGNESIUM: CPT | Performed by: SURGERY

## 2018-04-29 PROCEDURE — 85027 COMPLETE CBC AUTOMATED: CPT | Performed by: SURGERY

## 2018-04-29 PROCEDURE — 36415 COLL VENOUS BLD VENIPUNCTURE: CPT | Performed by: SURGERY

## 2018-04-29 PROCEDURE — 74011250637 HC RX REV CODE- 250/637: Performed by: NURSE PRACTITIONER

## 2018-04-29 PROCEDURE — 3331090001 HH PPS REVENUE CREDIT

## 2018-04-29 PROCEDURE — 74011250637 HC RX REV CODE- 250/637: Performed by: PHYSICIAN ASSISTANT

## 2018-04-29 PROCEDURE — 74011250636 HC RX REV CODE- 250/636: Performed by: SURGERY

## 2018-04-29 PROCEDURE — 65270000029 HC RM PRIVATE

## 2018-04-29 PROCEDURE — 74011250637 HC RX REV CODE- 250/637: Performed by: INTERNAL MEDICINE

## 2018-04-29 PROCEDURE — 74011250636 HC RX REV CODE- 250/636: Performed by: NURSE PRACTITIONER

## 2018-04-29 PROCEDURE — 74011250637 HC RX REV CODE- 250/637: Performed by: PHYSICAL MEDICINE & REHABILITATION

## 2018-04-29 PROCEDURE — 84100 ASSAY OF PHOSPHORUS: CPT | Performed by: SURGERY

## 2018-04-29 PROCEDURE — 80048 BASIC METABOLIC PNL TOTAL CA: CPT | Performed by: SURGERY

## 2018-04-29 RX ADMIN — MORPHINE SULFATE 45 MG: 15 TABLET ORAL at 20:41

## 2018-04-29 RX ADMIN — Medication 10 ML: at 09:36

## 2018-04-29 RX ADMIN — MORPHINE SULFATE 45 MG: 15 TABLET ORAL at 13:25

## 2018-04-29 RX ADMIN — LORAZEPAM 1 MG: 2 INJECTION INTRAMUSCULAR; INTRAVENOUS at 13:35

## 2018-04-29 RX ADMIN — PROCHLORPERAZINE EDISYLATE 5 MG: 5 INJECTION INTRAMUSCULAR; INTRAVENOUS at 18:37

## 2018-04-29 RX ADMIN — MORPHINE SULFATE 45 MG: 15 TABLET ORAL at 04:34

## 2018-04-29 RX ADMIN — Medication 10 ML: at 07:35

## 2018-04-29 RX ADMIN — MORPHINE SULFATE 100 MG: 100 TABLET, FILM COATED, EXTENDED RELEASE ORAL at 07:34

## 2018-04-29 RX ADMIN — MORPHINE SULFATE 100 MG: 100 TABLET, FILM COATED, EXTENDED RELEASE ORAL at 15:58

## 2018-04-29 RX ADMIN — MORPHINE SULFATE 45 MG: 15 TABLET ORAL at 00:32

## 2018-04-29 RX ADMIN — Medication 10 ML: at 07:34

## 2018-04-29 RX ADMIN — MORPHINE SULFATE 45 MG: 15 TABLET ORAL at 17:29

## 2018-04-29 RX ADMIN — ASPIRIN 325 MG: 325 TABLET ORAL at 09:32

## 2018-04-29 RX ADMIN — ENOXAPARIN SODIUM 40 MG: 100 INJECTION SUBCUTANEOUS at 09:33

## 2018-04-29 RX ADMIN — PROCHLORPERAZINE EDISYLATE 5 MG: 5 INJECTION INTRAMUSCULAR; INTRAVENOUS at 23:26

## 2018-04-29 RX ADMIN — DRONABINOL 2.5 MG: 2.5 CAPSULE ORAL at 19:26

## 2018-04-29 RX ADMIN — Medication 10 ML: at 22:59

## 2018-04-29 RX ADMIN — MORPHINE SULFATE 45 MG: 15 TABLET ORAL at 09:32

## 2018-04-29 RX ADMIN — MORPHINE SULFATE 115 MG: 15 TABLET, EXTENDED RELEASE ORAL at 22:59

## 2018-04-29 RX ADMIN — DRONABINOL 2.5 MG: 2.5 CAPSULE ORAL at 12:15

## 2018-04-29 RX ADMIN — ONDANSETRON HYDROCHLORIDE 4 MG: 2 INJECTION INTRAMUSCULAR; INTRAVENOUS at 07:41

## 2018-04-29 RX ADMIN — PROCHLORPERAZINE EDISYLATE 5 MG: 5 INJECTION INTRAMUSCULAR; INTRAVENOUS at 04:34

## 2018-04-29 NOTE — PROGRESS NOTES
04/29/2018; 09:45 -   CM received call from [address] [place] [address]: [phone] OR Heber Rosas: [phone]) to request updated PT and OT notes on patient for insurance authorization. Patient was last see by PT on 4/27/18 and OT on 4/26/18. CM placed call to weekend on-call PT and confirmed that the patient will be seen by both PT and OT first thing the morning of 4/30/18 for updated notes for insurance authorization process.   CRM: Ibeth Romero, MPH; Z: [phone]

## 2018-04-29 NOTE — PROGRESS NOTES
Bedside and Verbal shift change report given to Merlyn Matta RN (oncoming nurse) by Migue Kennedy RN (offgoing nurse). Report included the following information SBAR, Kardex, ED Summary, [facility] and Recent Results.

## 2018-04-29 NOTE — PROGRESS NOTES
Progress Note    Patient: Khadra Jason MRN: [id number]  SSN: [id number]    YOB: 1975  Age: 38 y.o. Sex: female      Admit Date: 3/7/2018    17 Days Post-Op    Procedure:  Procedure(s):  OPEN JEJUNOSTOMY TUBE PLACEMENT UNDER FLURO/ WOUND VAC CHANGE. Subjective:     Patient has no new complaints. Objective:     Visit Vitals    /61    Pulse (!) 103    Temp 98.2 °F (36.8 °C)    Resp 20    Ht 5' 4\" (1.626 m)    Wt 294 lb 8.6 oz (133.6 kg)    SpO2 95%    BMI 50.56 kg/m2       Temp (24hrs), Av.7 °F (37.1 °C), Min:98.2 °F (36.8 °C), Max:99.1 °F (37.3 °C)      Physical Exam:    GENERAL: alert, cooperative, no distress, appears stated age, LUNG: clear to auscultation bilaterally, HEART: regular rate and rhythm, ABDOMEN: soft non tender dressing essentially intact. Data Review: images and reports reviewed    Lab Review: All lab results for the last 24 hours reviewed.   Recent Results (from the past 24 hour(s))   CBC W/O DIFF    Collection Time: 18  4:33 AM   Result Value Ref Range    WBC 13.7 (H) 3.6 - 11.0 K/uL    RBC 3.28 (L) 3.80 - 5.20 M/uL    HGB 9.5 (L) 11.5 - 16.0 g/dL    HCT 30.3 (L) 35.0 - 47.0 %    MCV 92.4 80.0 - 99.0 FL    MCH 29.0 26.0 - 34.0 PG    MCHC 31.4 30.0 - 36.5 g/dL    RDW 17.2 (H) 11.5 - 14.5 %    PLATELET 658 080 - 476 K/uL    MPV 9.8 8.9 - 12.9 FL    NRBC 0.0 0  WBC    ABSOLUTE NRBC 0.00 0.00 - 5.12 K/uL   METABOLIC PANEL, BASIC    Collection Time: 18  4:33 AM   Result Value Ref Range    Sodium 135 (L) 136 - 145 mmol/L    Potassium 3.3 (L) 3.5 - 5.1 mmol/L    Chloride 92 (L) 97 - 108 mmol/L    CO2 32 21 - 32 mmol/L    Anion gap 11 5 - 15 mmol/L    Glucose 95 65 - 100 mg/dL    BUN 8 6 - 20 MG/DL    Creatinine 1.30 (H) 0.55 - 1.02 MG/DL    BUN/Creatinine ratio 6 (L) 12 - 20      GFR est AA 55 (L) >60 ml/min/1.73m2    GFR est non-AA 45 (L) >60 ml/min/1.73m2    Calcium 8.6 8.5 - 10.1 MG/DL   MAGNESIUM    Collection Time: 18  4:33 AM   Result Value Ref Range    Magnesium 1.7 1.6 - 2.4 mg/dL   PHOSPHORUS    Collection Time: 04/29/18  4:33 AM   Result Value Ref Range    Phosphorus 4.3 2.6 - 4.7 MG/DL       Assessment:     Hospital Problems  Date Reviewed: 10/27/2017          Codes Class Noted POA    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      Signed By: Perry Rahman MD     April 29, 2018

## 2018-04-30 PROCEDURE — 74011250637 HC RX REV CODE- 250/637: Performed by: INTERNAL MEDICINE

## 2018-04-30 PROCEDURE — 3331090001 HH PPS REVENUE CREDIT

## 2018-04-30 PROCEDURE — 97116 GAIT TRAINING THERAPY: CPT

## 2018-04-30 PROCEDURE — 74011250636 HC RX REV CODE- 250/636: Performed by: NURSE PRACTITIONER

## 2018-04-30 PROCEDURE — 74011250637 HC RX REV CODE- 250/637: Performed by: NURSE PRACTITIONER

## 2018-04-30 PROCEDURE — 74011250637 HC RX REV CODE- 250/637: Performed by: PHYSICAL MEDICINE & REHABILITATION

## 2018-04-30 PROCEDURE — 65270000029 HC RM PRIVATE

## 2018-04-30 PROCEDURE — 74011250636 HC RX REV CODE- 250/636: Performed by: SURGERY

## 2018-04-30 PROCEDURE — 77030010520

## 2018-04-30 PROCEDURE — 97535 SELF CARE MNGMENT TRAINING: CPT

## 2018-04-30 PROCEDURE — 3331090002 HH PPS REVENUE DEBIT

## 2018-04-30 PROCEDURE — 97530 THERAPEUTIC ACTIVITIES: CPT

## 2018-04-30 PROCEDURE — 97606 NEG PRS WND THER DME>50 SQCM: CPT

## 2018-04-30 PROCEDURE — 74011250637 HC RX REV CODE- 250/637: Performed by: PHYSICIAN ASSISTANT

## 2018-04-30 RX ADMIN — LORAZEPAM 1 MG: 2 INJECTION INTRAMUSCULAR; INTRAVENOUS at 01:27

## 2018-04-30 RX ADMIN — MORPHINE SULFATE 100 MG: 100 TABLET, FILM COATED, EXTENDED RELEASE ORAL at 07:01

## 2018-04-30 RX ADMIN — Medication 10 ML: at 05:11

## 2018-04-30 RX ADMIN — MORPHINE SULFATE 45 MG: 15 TABLET ORAL at 05:10

## 2018-04-30 RX ADMIN — ONDANSETRON HYDROCHLORIDE 4 MG: 2 INJECTION INTRAMUSCULAR; INTRAVENOUS at 12:43

## 2018-04-30 RX ADMIN — LORAZEPAM 1 MG: 2 INJECTION INTRAMUSCULAR; INTRAVENOUS at 18:40

## 2018-04-30 RX ADMIN — PROCHLORPERAZINE EDISYLATE 5 MG: 5 INJECTION INTRAMUSCULAR; INTRAVENOUS at 17:17

## 2018-04-30 RX ADMIN — MORPHINE SULFATE 45 MG: 15 TABLET ORAL at 17:17

## 2018-04-30 RX ADMIN — MORPHINE SULFATE 45 MG: 15 TABLET ORAL at 08:31

## 2018-04-30 RX ADMIN — ASPIRIN 325 MG: 325 TABLET ORAL at 08:31

## 2018-04-30 RX ADMIN — MORPHINE SULFATE 45 MG: 15 TABLET ORAL at 12:43

## 2018-04-30 RX ADMIN — ENOXAPARIN SODIUM 40 MG: 100 INJECTION SUBCUTANEOUS at 10:04

## 2018-04-30 RX ADMIN — Medication 10 ML: at 12:44

## 2018-04-30 RX ADMIN — MORPHINE SULFATE 115 MG: 15 TABLET, EXTENDED RELEASE ORAL at 23:36

## 2018-04-30 RX ADMIN — Medication 10 ML: at 23:39

## 2018-04-30 RX ADMIN — SODIUM CHLORIDE, SODIUM LACTATE, POTASSIUM CHLORIDE, AND CALCIUM CHLORIDE 50 ML/HR: 600; 310; 30; 20 INJECTION, SOLUTION INTRAVENOUS at 01:20

## 2018-04-30 RX ADMIN — PROCHLORPERAZINE EDISYLATE 5 MG: 5 INJECTION INTRAMUSCULAR; INTRAVENOUS at 08:31

## 2018-04-30 RX ADMIN — Medication 10 ML: at 18:41

## 2018-04-30 RX ADMIN — DRONABINOL 2.5 MG: 2.5 CAPSULE ORAL at 08:31

## 2018-04-30 RX ADMIN — DRONABINOL 2.5 MG: 2.5 CAPSULE ORAL at 17:17

## 2018-04-30 RX ADMIN — MORPHINE SULFATE 100 MG: 100 TABLET, FILM COATED, EXTENDED RELEASE ORAL at 16:08

## 2018-04-30 RX ADMIN — MORPHINE SULFATE 45 MG: 15 TABLET ORAL at 01:20

## 2018-04-30 RX ADMIN — MORPHINE SULFATE 45 MG: 15 TABLET ORAL at 21:17

## 2018-04-30 NOTE — PROGRESS NOTES
Bedside and Verbal shift change report given to Theresa Spaulding RN (oncoming nurse) by Rebel Erwin RN (offgoing nurse). Report included the following information SBAR, Kardex, ED Summary, [facility] and Recent Results.

## 2018-04-30 NOTE — WOUND CARE
Discussed discharge needs re: wound & fistula management with request for listing of supplies from Lashaun Goins, [address] liaison. They are as follows:  Medium black granufoam for [employer] VAC system  canisters & NPWT pump  4 Hawk's rings per dressing change  2.75\" 2-piece fecal pouch  Tube secure for feeding tube that allows red rubber to come out of pouch (one sent with her and we wash and reuse this with pouch changes)  Tube secure onto skin in LUQ (one left in place)  Adhesive remover - very fragile skin surrounding the [facility]  [place] - have used as needed with skin breakdown around [facility]    It is helpful to have wall suction set up during dressing change as this is a high output fistula. Colosotmy: flat pouch; 1 or 2 piece    Amos Luque has videotaped the VAC process on her phone to have a resource for home health or rehab.     Ben Maldonado, BRYANN

## 2018-04-30 NOTE — PROGRESS NOTES
Problem: Self Care Deficits Care Plan (Adult)  Goal: *Acute Goals and Plan of Care (Insert Text)  Occupational Therapy Goals  Revised 4/27/18  1. Patient will complete bathing upper body to knees with setup within 7 days upgraded 4/27  2. Patient will complete bariatric BSC transfer with supervision bariatric RW within 7 days. Upgraded 4/27  3. Patient will complete lower body dressing activities with supervision within 7 days using AE  4. Patient will perform standing for clothing management tasks (toileting or dressing) supervision bariatric RW within 7 days. Upgraded 4/27  5. Patient will complete light resistance band UE exercise program independently within 7 days. Upgraded 4/27/18    Reevaluated 4/20/2018  1. Patient will complete grooming activity sitting EOB without support for 10 minutes within 7 days  2. Patient will complete BSC transfer with min A x 1 persons within 7 days. 3. Patient will complete lower body dressing activities with supervision within 7 days using AE  4. Patient will perform standing for clothing management tasks (toileting or dressing) with min A x 1 persons within 7 days. 5. Patient will complete UE exercise program independently within 7 days. Reevaluated 4/10/2018  1. Patient will complete grooming activity sitting EOB without support for 10 minutes within 7 days. 2.  Patient will complete BSC transfer with min A x 2 persons within 7 days. 3.  Patient will complete lower body dressing activities with min A within 7 days. 4.  Patient will perform standing for clothing management tasks (toileting or dressing) with min A x 2 persons within 7 days. 5.  Patient will complete UE exercise program independently within 7 days. Initiated 4/4/2018  1. Patient will perform rolling and pulling self up to head of bed with min assist to assist with ADL's at bed level within 7 day(s).   2.  Patient will perform static sitting edge of bed > or = 10 minutes with supervision/set-up within 7 day(s). 3.  Patient will perform static standing with bilateral UE support on rolling walker > or = 3 minutes with minimal assistance/contact guard assist x's 2 within 7 day(s). 4.  Patient will perform toilet transfers with moderate assistance x's 2 to bedside commode within 7 days. 5.  Patient will perform bilateral UE AROM and strengthening exercises throughout day and grade exercises prn to increase demand within 7 day(s). Occupational Therapy TREATMENT  Patient: Khadra Jason (38 y.o. female)  Date: 4/30/2018  Diagnosis: FISTULA  Small bowel fistula  INTEROCUTANOUS FISTULA <principal problem not specified>  Procedure(s) (LRB):  OPEN JEJUNOSTOMY TUBE PLACEMENT UNDER FLURO/ WOUND VAC CHANGE. (N/A) 18 Days Post-Op  Precautions: Fall, Skin, Contact  Chart, occupational therapy assessment, plan of care, and goals were reviewed. ASSESSMENT:  Pt making steady progress with acute therapy. Pt very motivated and eager to discharge to rehab. Pt received OOB in chair, alert and oriented x4. Pt performed LB dressing via compensatory technique with AE, functional transfers, bed mobility and extensive education on progression of ADLs and compensatory techniques for pain management. ADLs overall supervision/set-up to totalA, CGA-Zena for transfers and short ambulation from chair to bed. ADLs limited by decreased functional strength and activity tolerance, standing tolerance, standing balance, functional reach to feet, chronic back pain, fearful of falling, extensive abdominal wound lines. Pt excellent at directing care and focused on ADL goals this session, responds well to positive feedback and graded tasks that are achievable, yet challenging. Continue to strongly recommend inpatient rehab at discharge as pt is below safe and functional baseline with ADLs/mobility.     Progression toward goals:  [x]       Improving appropriately and progressing toward goals  []       Improving slowly and progressing toward goals  []       Not making progress toward goals and plan of care will be adjusted     PLAN:  Patient continues to benefit from skilled intervention to address the above impairments. Continue treatment per established plan of care. Discharge Recommendations:  Inpatient Rehab  Further Equipment Recommendations for Discharge:  TBD at rehab     SUBJECTIVE:   Patient stated I walked in the hallway this morning, but my back started to hurt.     OBJECTIVE DATA SUMMARY:   Cognitive/Behavioral Status:  Neurologic State: Alert; Appropriate for age  Orientation Level: Oriented X4  Cognition: Follows commands; Appropriate for age attention/concentration; Appropriate decision making; Appropriate safety awareness  Perception: Appears intact  Perseveration: No perseveration noted  Safety/Judgement: Awareness of environment; Fall prevention;Good awareness of safety precautions; Home safety; Insight into deficits    Functional Mobility and Transfers for ADLs:  Bed Mobility:   CGA-Zena for LE management to return sit to supine    Transfers:  Sit to Stand: Contact guard assistance (from high chair)  Functional Transfers  Toilet Transfer : Minimum assistance (inferred)  Bed to Chair: Additional time;Assist x1;Minimum assistance    Balance:  Sitting: Intact; Without support  Standing: Impaired; Without support  Standing - Static: Good  Standing - Dynamic : Fair    ADL Intervention:                           Lower Body Dressing Assistance  Socks: Minimum assistance; Compensatory technique training  Slip on Shoes Without Back: Moderate assistance  Position Performed: Seated in chair  Cues: Verbal cues provided;Don;Doff  Adaptive Equipment Used: Reacher;Sock aid;Dressing stick         Cognitive Retraining  Safety/Judgement: Awareness of environment; Fall prevention;Good awareness of safety precautions; Home safety; Insight into deficits    Activity Tolerance:   VSS, good tolerance.     Please refer to the flowsheet for vital signs taken during this treatment.   After treatment:   [] Patient left in no apparent distress sitting up in chair  [x] Patient left in no apparent distress in bed  [x] Call bell left within reach  [x] Nursing notified  [] Caregiver present  [] Bed alarm activated    COMMUNICATION/COLLABORATION:   The patients plan of care was discussed with: Physical Therapist, Registered Nurse and     Bhumika Heck OT  Time Calculation: 28 mins

## 2018-04-30 NOTE — PROGRESS NOTES
Updated PT/OT assessments were completed and Vandana Damon liaison from [address] [place] [address] was notified (J#[phone]). [facility] has not provided an insurance authorization yet. Patient's mother Alessia Castillo) called asking for updates. Left a voicemail for her mother to explain that there is no insurance authorization yet (I#[phone]). Met with the patient at the bedside as well, who explained that her J-Tube fell out today and may need to be replaced prior to discharge. Care Management will continue to follow her disposition.    ROBERT Lemus

## 2018-04-30 NOTE — PROGRESS NOTES
Feeding remains at 30/h. C/o nausea. Patient also afraid output from fistula will Increase.  Patient did not want feeding increased

## 2018-04-30 NOTE — PROGRESS NOTES
Problem: Mobility Impaired (Adult and Pediatric)  Goal: *Acute Goals and Plan of Care (Insert Text)  Physical Therapy Goals  Continue progress towards current goals 4/26/18  Update 4/18/2018  1. Patient will move from supine to sit and sit to supine  in bed with minimal assistance  within 7 day(s). 2.  Patient will transfer from bed to chair and chair to bed with minimal assistance  using the least restrictive device within 7 day(s). 3.  Patient will perform sit to stand with SBA within 7 days  4. Patient will ambulate 150' with SBA x 1 using LRAD within 7 days. Update 4/6/2018  1. Patient will move from supine to sit and sit to supine  in bed with moderate assistance  within 7 day(s). 2.  Patient will transfer from bed to chair and chair to bed with moderate assistance  using the least restrictive device within 7 day(s). 3.  Patient will perform sit to stand with CGA within 7 days  4. Patient will ambulate 22' with moderate assist x 1 using LRAD within 7 days. Revised 3/27/2018  1. Patient will move from supine to sit and sit to supine  in bed with moderate assistance  within 7 day(s). 2.  Patient will transfer from bed to chair and chair to bed with moderate assistance  using the least restrictive device within 7 day(s). 3.  Patient will perform sit to stand with moderate assistance  within 7 day(s). 4.  Patient will ambulate 100' with moderate assist x1 using the least restrictive device within 7 day(s). Revisited 3/19/2018, goals remain appropriate, carry over    Physical Therapy Goals  Initiated 3/8/2018  1. Patient will move from supine to sit and sit to supine  in bed with moderate assistance  within 7 day(s). 2.  Patient will transfer from bed to chair and chair to bed with moderate assistance  using the least restrictive device within 7 day(s). 3.  Patient will perform sit to stand with moderate assistance  within 7 day(s).   4.  PT will assess gait with the least restrictive device within 7 day(s). physical Therapy TREATMENT  Patient: Gray Buchanan (38 y.o. female)  Date: 4/30/2018  Diagnosis: FISTULA  Small bowel fistula  INTEROCUTANOUS FISTULA <principal problem not specified>  Procedure(s) (LRB):  OPEN JEJUNOSTOMY TUBE PLACEMENT UNDER FLURO/ WOUND VAC CHANGE. (N/A) 18 Days Post-Op  Precautions: Fall, Skin, Contact  Chart, physical therapy assessment, plan of care and goals were reviewed. ASSESSMENT:  Pt was agreeable to therapy. Pt deferred to sit EOB due to wound leaking and did not to increase. Utilized bed in chair position. Pt was able to ambulate 40 feet with rolling walker. Pt has decrease foot clearance from floor due to fear of back pain. Pt reported slight fatigue with task. Post seated rest break pt was able to stand and ambulate holding IV pole and HHA. Pt reported feeling \"70%\" confident with task. Pt ambulated 20 feet. Pt was ambulating without a.d in February and her goal is to return. Pt also has a goal to perform steps to access her second story house and her bedroom. Pt is participatory. Pt could benefit from inpt rehab to progress mobility and independence. Progression toward goals:  [x]    Improving appropriately and progressing toward goals  []    Improving slowly and progressing toward goals  []    Not making progress toward goals and plan of care will be adjusted     PLAN:  Patient continues to benefit from skilled intervention to address the above impairments. Continue treatment per established plan of care. Discharge Recommendations:  Inpatient Rehab  Further Equipment Recommendations for Discharge:  TBD     SUBJECTIVE:   Patient stated My wound is leaking.     OBJECTIVE DATA SUMMARY:   Critical Behavior:  Neurologic State: Alert  Orientation Level: Oriented X4  Cognition: Follows commands  Safety/Judgement: Awareness of environment  Functional Mobility Training:  Bed Mobility:                    Transfers:  Sit to Stand: Minimum assistance (height dependent)  Stand to Sit: Contact guard assistance                             Balance:  Sitting: Intact; Without support  Standing: Impaired  Standing - Static: Good  Standing - Dynamic : Fair  Ambulation/Gait Training:  Distance (ft): 40 Feet (ft) (with rolling walker  CGA. 20 feet with IV pole and HHA Flavio)              Gait Abnormalities: Decreased step clearance        Base of Support: Widened     Speed/Liz: Slow  Step Length: Left shortened;Right shortened                    Stairs:              Neuro Re-Education:    Therapeutic Exercises:     Pain:  Pain Scale 1: Numeric (0 - 10)  Pain Intensity 1: 5  Pain Location 1: Abdomen  Pain Orientation 1: Anterior  Pain Description 1: Throbbing     Activity Tolerance:   Limited   Please refer to the flowsheet for vital signs taken during this treatment.   After treatment:   [x]    Patient left in no apparent distress sitting up in chair  []    Patient left in no apparent distress in bed  [x]    Call bell left within reach  [x]    Nursing notified  []    Caregiver present  []    Bed alarm activated    COMMUNICATION/COLLABORATION:   The patients plan of care was discussed with: Registered Nurse    Gracie Pascual PTA   Time Calculation: 19 mins

## 2018-04-30 NOTE — WOUND CARE
WOCN Note:     Follow-up visit for fistula management and VAC dressing change. Chart shows:  Admitted for fistula takdown 3/7/18 by Dr. Corene Spurling with [facility] placement in [address] [place] [address]; history of multiple abdominal surgeries and Crohns disease. Admitted from home. Mother able to provide assistance in pouching prior to admission and became very competent in doing so.       Assessment:   Patient is A&O x 4, continent and mobile - moves around room with assistance & has been working with PT.     Bed: total care bariatric      1. Midline abdominal surgical wound = 12 x 11.5 x 1.8 cm (Smaller)  70% pink/red granular wound base and 30% stomatized mucus membrane centrally with  peristalsis and output @ 4 o'clock. Red rubber cath in distal limb of stoma migrated out today - Christian Steven, NP at bedside and aware of migration. The red rubber then fell out before my visit ended. 1 piece of black sponge with central hole to allow for pouching of the fistulas; 3 Hawk's rings placed around hole top and bottom to achieve seal.  After seal achieved, a 2-piece pouch was placed to collect fistula output. 50 mmHg continuous suction. Wound Recommendations:    Maintain VAC as ordered. Discussed above plan with patient & RN, Shaunna Vale. Transition of Care: Plan to follow as needed while admitted to hospital with possibility of discharge to [address] [place] [address] this week.      Willard Cruz, YOLANDAN, RN, [facility] [place]  Certified Wound, Ostomy, Continence Nurse  office [phone]  pager [phone] or call  to page

## 2018-04-30 NOTE — PROGRESS NOTES
No complaints this AM.  Tm 99. 1HR: 101 BP: 104/69 Resp Rate: 16 94% sat on room air. Intake/Output Summary (Last 24 hours) at 04/30/18 0854  Last data filed at 04/30/18 0654   Gross per 24 hour   Intake             1490 ml   Output             6105 ml   Net            -4615 ml   Exam: Cor: RRR. Lungs: Bilateral breath sounds. Clear to auscultation. Abd: Soft. Non distended. Non tender. Wound vac in place with a leak. Labs: No results found for this or any previous visit (from the past 12 hour(s)). Diet as tolerated. Continue tube feeds and increase as able. Saline lock IVF. Wound care following. Palliative Care following. Discharge planning in progress. Will remove PICC line prior to discharge.

## 2018-05-01 LAB
ALBUMIN SERPL-MCNC: 2.1 G/DL (ref 3.5–5)
ALBUMIN/GLOB SERPL: 0.4 {RATIO} (ref 1.1–2.2)
ALP SERPL-CCNC: 102 U/L (ref 45–117)
ALT SERPL-CCNC: 17 U/L (ref 12–78)
ANION GAP SERPL CALC-SCNC: 10 MMOL/L (ref 5–15)
ANION GAP SERPL CALC-SCNC: 9 MMOL/L (ref 5–15)
AST SERPL-CCNC: 11 U/L (ref 15–37)
BILIRUB SERPL-MCNC: 0.5 MG/DL (ref 0.2–1)
BUN SERPL-MCNC: 11 MG/DL (ref 6–20)
BUN SERPL-MCNC: 12 MG/DL (ref 6–20)
BUN/CREAT SERPL: 8 (ref 12–20)
BUN/CREAT SERPL: 8 (ref 12–20)
CALCIUM SERPL-MCNC: 8.7 MG/DL (ref 8.5–10.1)
CALCIUM SERPL-MCNC: 9 MG/DL (ref 8.5–10.1)
CHLORIDE SERPL-SCNC: 87 MMOL/L (ref 97–108)
CHLORIDE SERPL-SCNC: 89 MMOL/L (ref 97–108)
CO2 SERPL-SCNC: 34 MMOL/L (ref 21–32)
CO2 SERPL-SCNC: 34 MMOL/L (ref 21–32)
CREAT SERPL-MCNC: 1.42 MG/DL (ref 0.55–1.02)
CREAT SERPL-MCNC: 1.54 MG/DL (ref 0.55–1.02)
ERYTHROCYTE [DISTWIDTH] IN BLOOD BY AUTOMATED COUNT: 16.6 % (ref 11.5–14.5)
GLOBULIN SER CALC-MCNC: 5.5 G/DL (ref 2–4)
GLUCOSE SERPL-MCNC: 132 MG/DL (ref 65–100)
GLUCOSE SERPL-MCNC: 133 MG/DL (ref 65–100)
HCT VFR BLD AUTO: 31.1 % (ref 35–47)
HGB BLD-MCNC: 9.9 G/DL (ref 11.5–16)
MCH RBC QN AUTO: 28.9 PG (ref 26–34)
MCHC RBC AUTO-ENTMCNC: 31.8 G/DL (ref 30–36.5)
MCV RBC AUTO: 90.9 FL (ref 80–99)
NRBC # BLD: 0 K/UL (ref 0–0.01)
NRBC BLD-RTO: 0 PER 100 WBC
PLATELET # BLD AUTO: 403 K/UL (ref 150–400)
PMV BLD AUTO: 9.5 FL (ref 8.9–12.9)
POTASSIUM SERPL-SCNC: 3.2 MMOL/L (ref 3.5–5.1)
POTASSIUM SERPL-SCNC: 3.2 MMOL/L (ref 3.5–5.1)
PROT SERPL-MCNC: 7.6 G/DL (ref 6.4–8.2)
RBC # BLD AUTO: 3.42 M/UL (ref 3.8–5.2)
SODIUM SERPL-SCNC: 130 MMOL/L (ref 136–145)
SODIUM SERPL-SCNC: 133 MMOL/L (ref 136–145)
WBC # BLD AUTO: 12.1 K/UL (ref 3.6–11)

## 2018-05-01 PROCEDURE — 74011250637 HC RX REV CODE- 250/637: Performed by: INTERNAL MEDICINE

## 2018-05-01 PROCEDURE — 80053 COMPREHEN METABOLIC PANEL: CPT | Performed by: NURSE PRACTITIONER

## 2018-05-01 PROCEDURE — 74011250637 HC RX REV CODE- 250/637: Performed by: NURSE PRACTITIONER

## 2018-05-01 PROCEDURE — 80048 BASIC METABOLIC PNL TOTAL CA: CPT | Performed by: SURGERY

## 2018-05-01 PROCEDURE — 97535 SELF CARE MNGMENT TRAINING: CPT

## 2018-05-01 PROCEDURE — 74011250636 HC RX REV CODE- 250/636: Performed by: SURGERY

## 2018-05-01 PROCEDURE — 97110 THERAPEUTIC EXERCISES: CPT

## 2018-05-01 PROCEDURE — 74011250637 HC RX REV CODE- 250/637: Performed by: PHYSICAL MEDICINE & REHABILITATION

## 2018-05-01 PROCEDURE — 36415 COLL VENOUS BLD VENIPUNCTURE: CPT | Performed by: NURSE PRACTITIONER

## 2018-05-01 PROCEDURE — 74011250637 HC RX REV CODE- 250/637: Performed by: PHYSICIAN ASSISTANT

## 2018-05-01 PROCEDURE — 85027 COMPLETE CBC AUTOMATED: CPT | Performed by: NURSE PRACTITIONER

## 2018-05-01 PROCEDURE — 77030019952 HC CANSTR VAC ASST KCON -B

## 2018-05-01 PROCEDURE — 74011250636 HC RX REV CODE- 250/636: Performed by: NURSE PRACTITIONER

## 2018-05-01 PROCEDURE — 65270000029 HC RM PRIVATE

## 2018-05-01 RX ORDER — SODIUM CHLORIDE, SODIUM LACTATE, POTASSIUM CHLORIDE, CALCIUM CHLORIDE 600; 310; 30; 20 MG/100ML; MG/100ML; MG/100ML; MG/100ML
62 INJECTION, SOLUTION INTRAVENOUS CONTINUOUS
Status: DISCONTINUED | OUTPATIENT
Start: 2018-05-01 | End: 2018-05-11

## 2018-05-01 RX ORDER — FLUCONAZOLE 2 MG/ML
200 INJECTION, SOLUTION INTRAVENOUS ONCE
Status: DISCONTINUED | OUTPATIENT
Start: 2018-05-01 | End: 2018-05-03

## 2018-05-01 RX ADMIN — MORPHINE SULFATE 45 MG: 15 TABLET ORAL at 13:46

## 2018-05-01 RX ADMIN — ASPIRIN 325 MG: 325 TABLET ORAL at 10:20

## 2018-05-01 RX ADMIN — MORPHINE SULFATE 45 MG: 15 TABLET ORAL at 10:19

## 2018-05-01 RX ADMIN — LORAZEPAM 1 MG: 2 INJECTION INTRAMUSCULAR; INTRAVENOUS at 17:24

## 2018-05-01 RX ADMIN — Medication 10 ML: at 21:10

## 2018-05-01 RX ADMIN — Medication 10 ML: at 05:28

## 2018-05-01 RX ADMIN — DRONABINOL 2.5 MG: 2.5 CAPSULE ORAL at 17:00

## 2018-05-01 RX ADMIN — MORPHINE SULFATE 100 MG: 100 TABLET, FILM COATED, EXTENDED RELEASE ORAL at 16:55

## 2018-05-01 RX ADMIN — MORPHINE SULFATE 100 MG: 100 TABLET, FILM COATED, EXTENDED RELEASE ORAL at 07:17

## 2018-05-01 RX ADMIN — FLUCONAZOLE IN SODIUM CHLORIDE 200 MG: 2 INJECTION, SOLUTION INTRAVENOUS at 16:55

## 2018-05-01 RX ADMIN — Medication 10 ML: at 11:39

## 2018-05-01 RX ADMIN — PROCHLORPERAZINE EDISYLATE 5 MG: 5 INJECTION INTRAMUSCULAR; INTRAVENOUS at 11:48

## 2018-05-01 RX ADMIN — MORPHINE SULFATE 45 MG: 15 TABLET ORAL at 17:29

## 2018-05-01 RX ADMIN — DRONABINOL 2.5 MG: 2.5 CAPSULE ORAL at 10:19

## 2018-05-01 RX ADMIN — Medication 10 ML: at 10:33

## 2018-05-01 RX ADMIN — MORPHINE SULFATE 45 MG: 15 TABLET ORAL at 21:09

## 2018-05-01 RX ADMIN — MORPHINE SULFATE 45 MG: 15 TABLET ORAL at 05:27

## 2018-05-01 RX ADMIN — Medication 10 ML: at 13:47

## 2018-05-01 RX ADMIN — Medication 10 ML: at 10:32

## 2018-05-01 RX ADMIN — MORPHINE SULFATE 45 MG: 15 TABLET ORAL at 01:13

## 2018-05-01 RX ADMIN — SODIUM CHLORIDE, SODIUM LACTATE, POTASSIUM CHLORIDE, AND CALCIUM CHLORIDE 125 ML/HR: 600; 310; 30; 20 INJECTION, SOLUTION INTRAVENOUS at 11:35

## 2018-05-01 RX ADMIN — MORPHINE SULFATE 115 MG: 15 TABLET, EXTENDED RELEASE ORAL at 23:08

## 2018-05-01 RX ADMIN — ENOXAPARIN SODIUM 40 MG: 100 INJECTION SUBCUTANEOUS at 10:21

## 2018-05-01 NOTE — PROGRESS NOTES
Progress Note    Patient: Mesha Javed MRN: [id number]  SSN: [id number]    YOB: 1975  Age: 38 y.o. Sex: female      Admit Date: 3/7/2018    19 Days Post-Op    Procedure:  Procedure(s):  OPEN JEJUNOSTOMY TUBE PLACEMENT UNDER FLURO/ WOUND VAC CHANGE. Subjective:   J tube fell out yesterday  She is still eating. Objective:     Visit Vitals    /62 (BP 1 Location: Left arm, BP Patient Position: At rest)    Pulse (!) 105    Temp 98 °F (36.7 °C)    Resp 16    Ht 5' 4\" (1.626 m)    Wt 303 lb 14.4 oz (137.8 kg)    SpO2 94%    BMI 52.16 kg/m2       Temp (24hrs), Av.7 °F (37.1 °C), Min:98 °F (36.7 °C), Max:99.1 °F (37.3 °C)      Physical Exam:    Gen- Alert in NAD  ABD- s/nt/nd there remains drainage from the fistula     Data Review: images and reports reviewed    Lab Review: All lab results for the last 24 hours reviewed. Recent Results (from the past 24 hour(s))   METABOLIC PANEL, COMPREHENSIVE    Collection Time: 18  1:20 AM   Result Value Ref Range    Sodium 133 (L) 136 - 145 mmol/L    Potassium 3.2 (L) 3.5 - 5.1 mmol/L    Chloride 89 (L) 97 - 108 mmol/L    CO2 34 (H) 21 - 32 mmol/L    Anion gap 10 5 - 15 mmol/L    Glucose 133 (H) 65 - 100 mg/dL    BUN 11 6 - 20 MG/DL    Creatinine 1.42 (H) 0.55 - 1.02 MG/DL    BUN/Creatinine ratio 8 (L) 12 - 20      GFR est AA 50 (L) >60 ml/min/1.73m2    GFR est non-AA 41 (L) >60 ml/min/1.73m2    Calcium 8.7 8.5 - 10.1 MG/DL    Bilirubin, total 0.5 0.2 - 1.0 MG/DL    ALT (SGPT) 17 12 - 78 U/L    AST (SGOT) 11 (L) 15 - 37 U/L    Alk.  phosphatase 102 45 - 117 U/L    Protein, total 7.6 6.4 - 8.2 g/dL    Albumin 2.1 (L) 3.5 - 5.0 g/dL    Globulin 5.5 (H) 2.0 - 4.0 g/dL    A-G Ratio 0.4 (L) 1.1 - 2.2         Assessment:     Hospital Problems  Date Reviewed: 10/27/2017          Codes Class Noted POA    Small bowel fistula ICD-10-CM: K63.2  ICD-9-CM: 569.81  3/7/2018 Unknown              Plan/Recommendations/Medical Decision Making: Creatinine seems to rising-  Will hold discharge for now  Restart IV fluids and monitor  Will need IVF at rehab to stave off dehydration  PO as tolerated  Will try to get elemental supplements    Signed By: sOman Cabral MD     May 1, 2018

## 2018-05-01 NOTE — PROGRESS NOTES
NUTRITION       Recommendation:  1. Document all fluid intake and meal intake percentages daily to help better assess drain/ostomy/vac output  2. Continue daily weights (standing scale whenever possible)  3. Recommend IVF on discharge-- CM checking with [facility] if it would be possible to run overnight    Brief note. Chart reviewed, discussed with RN and MD.  Noted plan was for discharge today; however, this has been delayed secondary to loss of J-tube and possible need for replacement, as well as need for IVF per labs. Lactated Ringers is running at 125 mL/hr. Suspect she will need ongoing hydration secondary to high fistula and vac output daily. Spoke with the pt about her tube feeding progression over the weekend. She only reached a rate of 30 mL/hr. It's unclear why the rate was note advanced 4/27-4/28, but there is one nursing note indicating that the pt was uncomfortable with increasing the rate past 30 mL/hr for fear of increasing output. That said, when this was discussed with the pt today, she stated that she figured it was just \"missed\" and that she was ready to advance. Currently, tube feedings are not an option, so po intake will remain her sole source of nutrition. Pt is making an effort to eat and drink all of her meals and liquids during the day. She drank 100% of a Boost Glucose Control prior to lunch meal today. Noted Vital 1.5 ordered TID as well and discussed with pt-- this would provide 1065 kcal, 48 g protein per day if 100% consumed. Also encouraged pt to trial mixing Liquid Prosource with supplements, soups, hot cereal, etc (>10 leftover packets in the room). I/O flowsheet is difficult to assess oral/fluid intake, but continue to encourage her to report all fluid and meal intake daily so it can be documented and accounted for in her output. This will also be helpful in assessing how much IVF she would need on a daily basis.     Assessing pt's absorption remains a barrier to understanding whether she is meeting her nutrition needs. [facility] would tell a piece of this, but would then likely need to measures protein content of both her fistula output and wound vac output to get a detailed picture. Fistula Drain Output:   5/1/18 (today): 1075 mL so far  4/30: 4275 mL  4/29: 5820 mL   4/28: 3900 mL Wound Vac:  5/1: 25 mL  4/30: 0  4/29: 435 mL  4/28: 200 mL Ileostomy:  None so far today  4/30: 400 mL  4/29: 175 mL  4/28: 650 mL       Patient Vitals for the past 100 hrs:   % Diet Eaten   04/29/18 1339 100 %   04/29/18 0933 100 %     Last standing weight 138.6 kg (306#) on 4/27/18. This indicates a 11.3 kg (25#) weight loss x 18 days. Estimated needs recalculated. Estimated Nutrition Needs:   Kcals/day: 2041 Kcals/day (6142-5327 kcal/day (MSJ x 1-1.1))  Protein: 111 g (111-152 g/day (0.8-1.1 kg))  Fluid: 2250 ml (or 1 mL/kcal or per output)     Based On: Kristen Ozuna  Weight Used: Actual wt (138.6 kg (standing on 4/27))     RD to follow.     [address]

## 2018-05-01 NOTE — PROGRESS NOTES
Physical Therapy Note    PT attempted to see patient this am after cleared by RN. Patient agreeable to session initially when talking to PT from hallway (prior to donning protective wear) after discussion regarding whether she had to get out of the bed. Education provided regarding benefits of getting up to the chair, patient agreeable to consider it after gait training. PT donned gown, entered room, then patient states she wants to wait for the RN to get anti-emetic medication. Lunch tray then arrives and patient states she prefers to eat prior to PT session. Advised would attempt to see later in day as scheduling permits but session may be missed.       Octavia Villanueva, MS, PT

## 2018-05-01 NOTE — PROGRESS NOTES
Insurance authorization has been obtained for the patient to transfer to [address] [place] [address] today. However, the patient's J-Tube fell out and may need to be replaced prior to discharge. This  will await direction from the surgery team.      Surgery team later explained that she can cancel and follow up regarding J-Tube replacement, as long as she can receive IV fluid on a nightly basis. Discharge was later arranged, but cancelled, due to the patient's creatinine level. Cancelled [facility] ambulance and called Honora Schwab, liaison, to update her (M#[phone]). Care Management will continue to follow her disposition.    ROBERT Medrano

## 2018-05-01 NOTE — PROGRESS NOTES
Bedside shift change report given to Paige Warren RN (oncoming nurse) by Asad Williamson RN (offgoing nurse). Report included the following information SBAR, Kardex, Intake/Output, MAR and Recent Results.

## 2018-05-01 NOTE — PROGRESS NOTES
Problem: Self Care Deficits Care Plan (Adult)  Goal: *Acute Goals and Plan of Care (Insert Text)  Occupational Therapy Goals  Revised 4/27/18  1. Patient will complete bathing upper body to knees with setup within 7 days upgraded 4/27  2. Patient will complete bariatric BSC transfer with supervision bariatric RW within 7 days. Upgraded 4/27  3. Patient will complete lower body dressing activities with supervision within 7 days using AE  4. Patient will perform standing for clothing management tasks (toileting or dressing) supervision bariatric RW within 7 days. Upgraded 4/27  5. Patient will complete light resistance band UE exercise program independently within 7 days. Upgraded 4/27/18    Reevaluated 4/20/2018  1. Patient will complete grooming activity sitting EOB without support for 10 minutes within 7 days  2. Patient will complete BSC transfer with min A x 1 persons within 7 days. 3. Patient will complete lower body dressing activities with supervision within 7 days using AE  4. Patient will perform standing for clothing management tasks (toileting or dressing) with min A x 1 persons within 7 days. 5. Patient will complete UE exercise program independently within 7 days. Reevaluated 4/10/2018  1. Patient will complete grooming activity sitting EOB without support for 10 minutes within 7 days. 2.  Patient will complete BSC transfer with min A x 2 persons within 7 days. 3.  Patient will complete lower body dressing activities with min A within 7 days. 4.  Patient will perform standing for clothing management tasks (toileting or dressing) with min A x 2 persons within 7 days. 5.  Patient will complete UE exercise program independently within 7 days. Initiated 4/4/2018  1. Patient will perform rolling and pulling self up to head of bed with min assist to assist with ADL's at bed level within 7 day(s).   2.  Patient will perform static sitting edge of bed > or = 10 minutes with supervision/set-up within 7 day(s). 3.  Patient will perform static standing with bilateral UE support on rolling walker > or = 3 minutes with minimal assistance/contact guard assist x's 2 within 7 day(s). 4.  Patient will perform toilet transfers with moderate assistance x's 2 to bedside commode within 7 days. 5.  Patient will perform bilateral UE AROM and strengthening exercises throughout day and grade exercises prn to increase demand within 7 day(s). Occupational Therapy TREATMENT  Patient: Manuel Garcia (38 y.o. female)  Date: 5/1/2018  Diagnosis: FISTULA  Small bowel fistula  INTEROCUTANOUS FISTULA <principal problem not specified>  Procedure(s) (LRB):  OPEN JEJUNOSTOMY TUBE PLACEMENT UNDER FLURO/ WOUND VAC CHANGE. (N/A) 19 Days Post-Op  Precautions: Fall, Skin, Contact  Chart, occupational therapy assessment, plan of care, and goals were reviewed. ASSESSMENT:  Cleared by RN to see pt for therapy session. Pt received semisupine in bed, agreeable to participate. Educated pt on logroll for bed mobility to avoid back pain but pt still fearful of pain with other techniques for supine>sit besides chair position. Pt brought to sitting in chair position and then participated in UB bathing with min A and UB dressing with setup. Reviewed UE exercise program and added additional tricep exercise in order to increased strength for transfers, and pt increased to 15 reps of each exercise using theraband with several rest breaks. Pt participated in forward flexion exercise to increase trunk strength and allow nurse to apply medication to back. Pt in chair position at end of session, call bell in reach and needs met, nurse aware. She will benefit from continued OT to address the above goals,, and is motivated to work with therapy. Recommend inpatient rehab at discharge.   Progression toward goals:  [x]       Improving appropriately and progressing toward goals  []       Improving slowly and progressing toward goals  []       Not making progress toward goals and plan of care will be adjusted     PLAN:  Patient continues to benefit from skilled intervention to address the above impairments. Continue treatment per established plan of care. Discharge Recommendations:  Inpatient Rehab  Further Equipment Recommendations for Discharge:  TBD at rehab     SUBJECTIVE:   Patient stated Thanks for helping me.     OBJECTIVE DATA SUMMARY:   Cognitive/Behavioral Status:  Neurologic State: Alert  Orientation Level: Oriented X4  Cognition: Follows commands  Perception: Appears intact  Perseveration: No perseveration noted  Safety/Judgement: Awareness of environment;Good awareness of safety precautions    Functional Mobility and Transfers for ADLs:  Bed Mobility:  Rolling: Minimum assistance  Supine to Sit:  (performed with bed in chair position)  Scooting: Supervision;Setup; Adaptive equipment; Additional time (bed in trendelenburg)    Transfers:   Not attempted this session. Balance:  Sitting: Intact; With support    ADL Intervention:    Educated pt on logroll for bed mobility to avoid back pain but pt still fearful of pain with other techniques for supine>sit besides chair position. Pt brought to sitting in chair position and then participated in UB bathing with min A and UB dressing with setup.  Required min A to wash and put lotion under L armpit    Grooming  Washing Face: Independent (in supported sit)    Upper Body Bathing  Bathing Assistance: Minimum assistance (in suppported sit)         Upper Body [address]: Supervision/ set-up              Cognitive Retraining  Safety/Judgement: Awareness of environment;Good awareness of safety precautions       Therapeutic Exercises:   Reviewed UE exercise program and added additional tricep exercise in order to increased strength for transfers, and pt performed 15 reps of each exercise using theraband with several rest breaks (chest extension, tricep extension and bicep curl exercises). Pt participated in forward flexion exercise to increase trunk strength and allow nurse to apply medication to back. Pain:  Pain Scale 1: Numeric (0 - 10)  Pain Intensity 1: 7  Pain Location 1: Abdomen  Pain Orientation 1: Anterior  Pain Description 1: Throbbing  Pain Intervention(s) 1: Medication (see MAR)  Activity Tolerance:   Good  Please refer to the flowsheet for vital signs taken during this treatment.   After treatment:   [] Patient left in no apparent distress sitting up in chair  [x] Patient left in no apparent distress in bed  [x] Call bell left within reach  [x] Nursing notified  [] Caregiver present  [] Bed alarm activated    COMMUNICATION/COLLABORATION:   The patients plan of care was discussed with: Registered Nurse    Leticia Roberts OT  Time Calculation: 43 mins

## 2018-05-01 NOTE — PROGRESS NOTES
Bedside and Verbal shift change report given to Gayathri Hoover RN (oncoming nurse) by Daniel Barth RN (offgoing nurse). Report included the following information SBAR, Kardex, ED Summary, Mónica Botello and Recent Results.

## 2018-05-02 LAB
ANION GAP SERPL CALC-SCNC: 9 MMOL/L (ref 5–15)
BUN SERPL-MCNC: 11 MG/DL (ref 6–20)
BUN/CREAT SERPL: 7 (ref 12–20)
CALCIUM SERPL-MCNC: 9.3 MG/DL (ref 8.5–10.1)
CHLORIDE SERPL-SCNC: 88 MMOL/L (ref 97–108)
CO2 SERPL-SCNC: 35 MMOL/L (ref 21–32)
CREAT SERPL-MCNC: 1.59 MG/DL (ref 0.55–1.02)
ERYTHROCYTE [DISTWIDTH] IN BLOOD BY AUTOMATED COUNT: 16.4 % (ref 11.5–14.5)
GLUCOSE SERPL-MCNC: 138 MG/DL (ref 65–100)
HCT VFR BLD AUTO: 31.9 % (ref 35–47)
HGB BLD-MCNC: 9.9 G/DL (ref 11.5–16)
MCH RBC QN AUTO: 28.1 PG (ref 26–34)
MCHC RBC AUTO-ENTMCNC: 31 G/DL (ref 30–36.5)
MCV RBC AUTO: 90.6 FL (ref 80–99)
NRBC # BLD: 0 K/UL (ref 0–0.01)
NRBC BLD-RTO: 0 PER 100 WBC
PLATELET # BLD AUTO: 431 K/UL (ref 150–400)
PMV BLD AUTO: 9.4 FL (ref 8.9–12.9)
POTASSIUM SERPL-SCNC: 2.9 MMOL/L (ref 3.5–5.1)
RBC # BLD AUTO: 3.52 M/UL (ref 3.8–5.2)
SODIUM SERPL-SCNC: 132 MMOL/L (ref 136–145)
WBC # BLD AUTO: 13.1 K/UL (ref 3.6–11)

## 2018-05-02 PROCEDURE — 74011250637 HC RX REV CODE- 250/637: Performed by: PHYSICAL MEDICINE & REHABILITATION

## 2018-05-02 PROCEDURE — 85027 COMPLETE CBC AUTOMATED: CPT | Performed by: SURGERY

## 2018-05-02 PROCEDURE — 97530 THERAPEUTIC ACTIVITIES: CPT

## 2018-05-02 PROCEDURE — 77030010541

## 2018-05-02 PROCEDURE — 65270000029 HC RM PRIVATE

## 2018-05-02 PROCEDURE — 74011250637 HC RX REV CODE- 250/637: Performed by: NURSE PRACTITIONER

## 2018-05-02 PROCEDURE — 74011250636 HC RX REV CODE- 250/636: Performed by: NURSE PRACTITIONER

## 2018-05-02 PROCEDURE — 97606 NEG PRS WND THER DME>50 SQCM: CPT

## 2018-05-02 PROCEDURE — 36415 COLL VENOUS BLD VENIPUNCTURE: CPT | Performed by: SURGERY

## 2018-05-02 PROCEDURE — 97535 SELF CARE MNGMENT TRAINING: CPT

## 2018-05-02 PROCEDURE — 77030010545

## 2018-05-02 PROCEDURE — 77030018717 HC DRSG GRNUFM KCON -B

## 2018-05-02 PROCEDURE — 74011250637 HC RX REV CODE- 250/637: Performed by: PHYSICIAN ASSISTANT

## 2018-05-02 PROCEDURE — 74011250636 HC RX REV CODE- 250/636: Performed by: SURGERY

## 2018-05-02 PROCEDURE — 77030010520

## 2018-05-02 PROCEDURE — 80048 BASIC METABOLIC PNL TOTAL CA: CPT | Performed by: SURGERY

## 2018-05-02 PROCEDURE — 77030010522

## 2018-05-02 PROCEDURE — 77030019952 HC CANSTR VAC ASST KCON -B

## 2018-05-02 PROCEDURE — 97116 GAIT TRAINING THERAPY: CPT

## 2018-05-02 PROCEDURE — 74011250637 HC RX REV CODE- 250/637: Performed by: INTERNAL MEDICINE

## 2018-05-02 RX ORDER — HYDROMORPHONE HYDROCHLORIDE 5 MG/5ML
1 SOLUTION ORAL ONCE
Status: DISCONTINUED | OUTPATIENT
Start: 2018-05-02 | End: 2018-05-02

## 2018-05-02 RX ORDER — HYDROMORPHONE HYDROCHLORIDE 2 MG/ML
1 INJECTION, SOLUTION INTRAMUSCULAR; INTRAVENOUS; SUBCUTANEOUS ONCE
Status: COMPLETED | OUTPATIENT
Start: 2018-05-02 | End: 2018-05-02

## 2018-05-02 RX ADMIN — DRONABINOL 2.5 MG: 2.5 CAPSULE ORAL at 18:20

## 2018-05-02 RX ADMIN — PROCHLORPERAZINE EDISYLATE 5 MG: 5 INJECTION INTRAMUSCULAR; INTRAVENOUS at 09:24

## 2018-05-02 RX ADMIN — SODIUM CHLORIDE, SODIUM LACTATE, POTASSIUM CHLORIDE, AND CALCIUM CHLORIDE 125 ML/HR: 600; 310; 30; 20 INJECTION, SOLUTION INTRAVENOUS at 02:29

## 2018-05-02 RX ADMIN — PROCHLORPERAZINE EDISYLATE 5 MG: 5 INJECTION INTRAMUSCULAR; INTRAVENOUS at 21:06

## 2018-05-02 RX ADMIN — MORPHINE SULFATE 100 MG: 100 TABLET, FILM COATED, EXTENDED RELEASE ORAL at 07:55

## 2018-05-02 RX ADMIN — Medication 10 ML: at 22:00

## 2018-05-02 RX ADMIN — PROCHLORPERAZINE EDISYLATE 5 MG: 5 INJECTION INTRAMUSCULAR; INTRAVENOUS at 16:27

## 2018-05-02 RX ADMIN — MORPHINE SULFATE 115 MG: 15 TABLET, EXTENDED RELEASE ORAL at 22:05

## 2018-05-02 RX ADMIN — MORPHINE SULFATE 100 MG: 100 TABLET, FILM COATED, EXTENDED RELEASE ORAL at 16:27

## 2018-05-02 RX ADMIN — ONDANSETRON HYDROCHLORIDE 4 MG: 2 INJECTION INTRAMUSCULAR; INTRAVENOUS at 12:22

## 2018-05-02 RX ADMIN — HYDROMORPHONE HYDROCHLORIDE 1 MG: 2 INJECTION INTRAMUSCULAR; INTRAVENOUS; SUBCUTANEOUS at 20:18

## 2018-05-02 RX ADMIN — MORPHINE SULFATE 45 MG: 15 TABLET ORAL at 14:08

## 2018-05-02 RX ADMIN — MORPHINE SULFATE 45 MG: 15 TABLET ORAL at 09:24

## 2018-05-02 RX ADMIN — MORPHINE SULFATE 45 MG: 15 TABLET ORAL at 18:20

## 2018-05-02 RX ADMIN — ENOXAPARIN SODIUM 40 MG: 100 INJECTION SUBCUTANEOUS at 09:24

## 2018-05-02 RX ADMIN — LORAZEPAM 1 MG: 2 INJECTION INTRAMUSCULAR; INTRAVENOUS at 03:39

## 2018-05-02 RX ADMIN — Medication 10 ML: at 07:55

## 2018-05-02 RX ADMIN — MORPHINE SULFATE 45 MG: 15 TABLET ORAL at 01:03

## 2018-05-02 RX ADMIN — MORPHINE SULFATE 45 MG: 15 TABLET ORAL at 21:06

## 2018-05-02 RX ADMIN — DRONABINOL 2.5 MG: 2.5 CAPSULE ORAL at 09:26

## 2018-05-02 RX ADMIN — ASPIRIN 325 MG: 325 TABLET ORAL at 09:25

## 2018-05-02 RX ADMIN — MORPHINE SULFATE 45 MG: 15 TABLET ORAL at 05:34

## 2018-05-02 NOTE — PROGRESS NOTES
Problem: Self Care Deficits Care Plan (Adult)  Goal: *Acute Goals and Plan of Care (Insert Text)  Occupational Therapy Goals  Revised 4/27/18  1. Patient will complete bathing upper body to knees with setup within 7 days upgraded 4/27  2. Patient will complete bariatric BSC transfer with supervision bariatric RW within 7 days. Upgraded 4/27  3. Patient will complete lower body dressing activities with supervision within 7 days using AE  4. Patient will perform standing for clothing management tasks (toileting or dressing) supervision bariatric RW within 7 days. Upgraded 4/27  5. Patient will complete light resistance band UE exercise program independently within 7 days. Upgraded 4/27/18    Reevaluated 4/20/2018  1. Patient will complete grooming activity sitting EOB without support for 10 minutes within 7 days  2. Patient will complete BSC transfer with min A x 1 persons within 7 days. 3. Patient will complete lower body dressing activities with supervision within 7 days using AE  4. Patient will perform standing for clothing management tasks (toileting or dressing) with min A x 1 persons within 7 days. 5. Patient will complete UE exercise program independently within 7 days. Reevaluated 4/10/2018  1. Patient will complete grooming activity sitting EOB without support for 10 minutes within 7 days. 2.  Patient will complete BSC transfer with min A x 2 persons within 7 days. 3.  Patient will complete lower body dressing activities with min A within 7 days. 4.  Patient will perform standing for clothing management tasks (toileting or dressing) with min A x 2 persons within 7 days. 5.  Patient will complete UE exercise program independently within 7 days. Initiated 4/4/2018  1. Patient will perform rolling and pulling self up to head of bed with min assist to assist with ADL's at bed level within 7 day(s).   2.  Patient will perform static sitting edge of bed > or = 10 minutes with supervision/set-up within 7 day(s). 3.  Patient will perform static standing with bilateral UE support on rolling walker > or = 3 minutes with minimal assistance/contact guard assist x's 2 within 7 day(s). 4.  Patient will perform toilet transfers with moderate assistance x's 2 to bedside commode within 7 days. 5.  Patient will perform bilateral UE AROM and strengthening exercises throughout day and grade exercises prn to increase demand within 7 day(s). Occupational Therapy TREATMENT  Patient: Rd Lemus (38 y.o. female)  Date: 5/2/2018  Diagnosis: FISTULA  Small bowel fistula  INTEROCUTANOUS FISTULA <principal problem not specified>  Procedure(s) (LRB):  OPEN JEJUNOSTOMY TUBE PLACEMENT UNDER FLURO/ WOUND VAC CHANGE. (N/A) 20 Days Post-Op  Precautions: Fall, Skin, Contact  Chart, occupational therapy assessment, plan of care, and goals were reviewed. ASSESSMENT:  Progresses well with co-tx of therapies and continued focused on encouragement through the tasks, able to complete bed mobility with min A and functional mobility from EOB to [facility] on other side of bed with HHA x2, fearful and tearful with BSC transfer but able to complete with mod Ax1 off BSC for trial, continues to want to use female urinal and hygiene in bed due to difficulty with squatting for hygiene on BSC, discussed various adaptive ways for hygiene due to anxiety around hygiene with BSC use and falls. Will continue to progress standing ADLs and exercises OOB as tolerated, continue to recommend IP rehab, motivated  And excellent candidate  Progression toward goals:  [x]       Improving appropriately and progressing toward goals  []       Improving slowly and progressing toward goals  []       Not making progress toward goals and plan of care will be adjusted     PLAN:  Patient continues to benefit from skilled intervention to address the above impairments. Continue treatment per established plan of care.   Discharge Recommendations:  Inpatient Rehab  Further Equipment Recommendations for Discharge:  TBD at rehab     SUBJECTIVE:   Patient stated I can't clean on that thing.     OBJECTIVE DATA SUMMARY:   Cognitive/Behavioral Status:      alert                Functional Mobility and Transfers for ADLs:  Bed Mobility:  Supine to Sit: Minimum assistance    Transfers:  Sit to Stand: Minimum assistance; Additional time;Assist x1  Functional Transfers  Toilet Transfer : Minimum assistance; Additional time  Bed to Chair: Minimum assistance; Additional time;Assist x1    Balance:  Sitting: Intact  Standing: Impaired; With support (HHA)  Standing - Static: Constant support;Good    ADL Intervention:         Focused session on what patient can do for herself with encouragement through anxiety and fear of BSC/falls. Patient agreeable with x2 assist, good balance with mobility in room, min A on BSC then mod A with gentle boost to stand off BSC with patient tearful at first but understanding of what she accomplished, expected bright affect post goal however remained focused on anxiety with other things going on. Left up in chair with PTA for exercises    Toileting  Bladder Hygiene: Supervision/set-up; Total assistance (dependent) (in bed remains total A if standing)  Clothing Management: Total assistance (dependent) (if standing)         Neuro Re-Education:           Therapeutic Exercises:   encouraged HEP  Pain:  Pain Scale 1: Numeric (0 - 10)  Pain Intensity 1: 3              Activity Tolerance:   fair  Please refer to the flowsheet for vital signs taken during this treatment.   After treatment:   [x] Patient left in no apparent distress sitting up in chair  [] Patient left in no apparent distress in bed  [x] Call bell left within reach  [x] Nursing notified  [] Caregiver present  [] Bed alarm activated    COMMUNICATION/COLLABORATION:   The patients plan of care was discussed with: Physical Therapy Assistant and Registered Nurse    Justina Early OT  Time Calculation: 23 mins

## 2018-05-02 NOTE — PROGRESS NOTES
Problem: Mobility Impaired (Adult and Pediatric)  Goal: *Acute Goals and Plan of Care (Insert Text)  Physical Therapy Goals  Continue progress towards current goals 4/26/18  Update 4/18/2018  1. Patient will move from supine to sit and sit to supine  in bed with minimal assistance  within 7 day(s). 2.  Patient will transfer from bed to chair and chair to bed with minimal assistance  using the least restrictive device within 7 day(s). 3.  Patient will perform sit to stand with SBA within 7 days  4. Patient will ambulate 150' with SBA x 1 using LRAD within 7 days. Update 4/6/2018  1. Patient will move from supine to sit and sit to supine  in bed with moderate assistance  within 7 day(s). 2.  Patient will transfer from bed to chair and chair to bed with moderate assistance  using the least restrictive device within 7 day(s). 3.  Patient will perform sit to stand with CGA within 7 days  4. Patient will ambulate 22' with moderate assist x 1 using LRAD within 7 days. Revised 3/27/2018  1. Patient will move from supine to sit and sit to supine  in bed with moderate assistance  within 7 day(s). 2.  Patient will transfer from bed to chair and chair to bed with moderate assistance  using the least restrictive device within 7 day(s). 3.  Patient will perform sit to stand with moderate assistance  within 7 day(s). 4.  Patient will ambulate 100' with moderate assist x1 using the least restrictive device within 7 day(s). Revisited 3/19/2018, goals remain appropriate, carry over    Physical Therapy Goals  Initiated 3/8/2018  1. Patient will move from supine to sit and sit to supine  in bed with moderate assistance  within 7 day(s). 2.  Patient will transfer from bed to chair and chair to bed with moderate assistance  using the least restrictive device within 7 day(s). 3.  Patient will perform sit to stand with moderate assistance  within 7 day(s).   4.  PT will assess gait with the least restrictive device within 7 day(s). physical Therapy TREATMENT  Patient: Khadra Jason (38 y.o. female)  Date: 5/2/2018  Diagnosis: FISTULA  Small bowel fistula  INTEROCUTANOUS FISTULA <principal problem not specified>  Procedure(s) (LRB):  OPEN JEJUNOSTOMY TUBE PLACEMENT UNDER FLURO/ WOUND VAC CHANGE. (N/A) 20 Days Post-Op  Precautions: Fall, Skin, Contact  Chart, physical therapy assessment, plan of care and goals were reviewed. ASSESSMENT:  Pt has anxiety with certain tasks due to fear and history of falls. Co treated with OT to encourage BSC attempt. Pt was able to ambulate without rolling walker but was utilizing HHA and furniture walking. Post completion with OT pt was able to ambulate with rolling walker in the hallway. Pt has had limited tolerance since back pain. Pt required min A for bed mobility and transfer. Pt could benefit from inpt rehab to progress her mobility and independence. Progression toward goals:  [x]    Improving appropriately and progressing toward goals  []    Improving slowly and progressing toward goals  []    Not making progress toward goals and plan of care will be adjusted     PLAN:  Patient continues to benefit from skilled intervention to address the above impairments. Continue treatment per established plan of care. Discharge Recommendations:  Inpatient Rehab  Further Equipment Recommendations for Discharge:  TBD     SUBJECTIVE:   Patient stated I dont want to get stuck.   On BSC    OBJECTIVE DATA SUMMARY:   Critical Behavior:  Neurologic State: Alert  Orientation Level: Oriented X4  Cognition: Follows commands  Safety/Judgement: Awareness of environment, Good awareness of safety precautions  Functional Mobility Training:  Bed Mobility:     Supine to Sit: Minimum assistance              Transfers:  Sit to Stand: Minimum assistance; Additional time;Assist x1           Bed to Chair: Minimum assistance; Additional time;Assist x1 Balance:  Sitting: Intact  Standing: Impaired; With support (HHA)  Standing - Static: Constant support;Good  Ambulation/Gait Training:  Distance (ft): 60 Feet (ft) (20 HHA/Min A)  Assistive Device: Walker, rolling  Ambulation - Level of Assistance: Contact guard assistance (min A with HHA with No A. D)        Gait Abnormalities: Decreased step clearance        Base of Support: Widened     Speed/Liz: Slow  Step Length: Left shortened;Right shortened                    Stairs:              Neuro Re-Education:    Therapeutic Exercises:   Pt defer standing due to fear of left ankle pain. Pain:  Pain Scale 1: Numeric (0 - 10)  Pain Intensity 1: 3              Activity Tolerance:   Limited   Please refer to the flowsheet for vital signs taken during this treatment.   After treatment:   [x]    Patient left in no apparent distress sitting up in chair  []    Patient left in no apparent distress in bed  [x]    Call bell left within reach  [x]    Nursing notified  []    Caregiver present  []    Bed alarm activated    COMMUNICATION/COLLABORATION:   The patients plan of care was discussed with: Registered Nurse    Cassandra Crump PTA   Time Calculation: 38 mins

## 2018-05-02 NOTE — WOUND CARE
WOCN Note:     Follow-up visit for [facility] dressing change and fistula pouching. Chart shows:  Admitted for fistula takdown 3/7/18 by Dr. Hillary Serrano with [facility] placement in [address] [place] [address]; history of multiple abdominal surgeries and Crohns disease. Admitted from home. Mother able to provide assistance in pouching prior to admission and became very competent in doing so. Seen today with Dr. Dominique Calvert.      Assessment:   Patient is A&O x 4, continent and mobile - moves around room with assistance & has been working with PT.     Bed: total care bariatric      1. Midline abdominal surgical wound = 12 x 11.5 x 1.8 cm  70% pink/red granular wound base and 30% stomatized mucus membrane centrally with  peristalsis and output @ 4 o'clock. 1 piece of black sponge with central hole to allow for pouching of the fistulas; 3 Hawk's rings placed around hole top and bottom to achieve seal.  After seal achieved, a 2-piece pouch was placed to collect fistula output. 50 mmHg continuous suction.      Wound Recommendations:    Maintain VAC as ordered. Discussed above plan with patient, RN, Minerva Moise, and Dr. Dominique Calvert. Transition of Care: Plan to follow weekly as needed while admitted to hospital. Possible discharge on Friday depending on labs and status of IV possibilities @ [facility]. Wound is small enough to begin pouching - have reached out to vendor about a special order. SONY Obrien, RN, [facility]  Certified Wound, Ostomy, Continence Nurse  office [phone]  pager [phone] or call  to page        54 [phone]: pouch leaking with chunky effluent also creeping up under VAC sponge. Full dressing removal and large Hawk's wound manager applied with 3 Hawk's rings and paste and skin prep. Periwound skin protected with Marlin in scattered areas. Pouch connected to bedside bag. Will continue to follow.   AMPARO Obrien

## 2018-05-02 NOTE — PROGRESS NOTES
Orders written on pt's chart to hang TPN at 75ml/hr at 1900 and also an order for Diflucan IV x one dose. Diflucan hung per orders and this nurse was telling pt about TPN to be started. Pt became extremely upset, crying and states that none of MD's discussed this with her and that she did not want to restart TPN. Call placed to on-call MD. Dr Hiral Luke on call. MD states that Orders should not be on this pt, that  She does not need to have TPN or Diflucan. Dr Hiral Luke called back a second time and states that the orders written on this pt by Frederick Banegas should have been written on another pt. Phone orders given to discontinue these orders on this pt. MD aware that diflucan had been started but was not completed.

## 2018-05-02 NOTE — PROGRESS NOTES
Reviewed medical chart and spoke with the attending MD.  The patient's lab work is still not stable today for discharge. Spoke with the [facility] liaison, Dale Crum, to update her (K#[phone]). Dale Crum explained that the patient's authorization will , and she will need a new authorization. [facility] cannot request a new [facility] until the patient is officially deemed medically stable, however. Dale Crum explained that she will follow the patient through the weekend and will request authorization as of next week if she is stable then. Care Management will continue to follow her disposition.    ROBERT Reddy

## 2018-05-02 NOTE — PROGRESS NOTES
Bedside and Verbal shift change report given to Bhanu Chapman (oncoming nurse) by Bernie Ureña (offgoing nurse). Report included the following information SBAR, Kardex, Procedure Summary, Intake/Output, MAR, Accordion and Recent Results.

## 2018-05-02 NOTE — PROGRESS NOTES
Wound vac removed By wound care nurse due to continued leaking after wound vac changed earlier in the day. Pouch has been leaking all shift, multiple attempts to patch but continues to leak and pt already has multiple areas of excoriated skin. Went in to remove pouch and place a dressing to abd but pt wants to have IV Dilaudid before getting dressing applied. Paged on call MD. Dr Lorraine Albarran returned call and gave verbal orders for Dilaudid IV 1 mg prior to wound care.

## 2018-05-03 LAB
ANION GAP SERPL CALC-SCNC: 10 MMOL/L (ref 5–15)
BUN SERPL-MCNC: 11 MG/DL (ref 6–20)
BUN/CREAT SERPL: 7 (ref 12–20)
CALCIUM SERPL-MCNC: 8.8 MG/DL (ref 8.5–10.1)
CHLORIDE SERPL-SCNC: 88 MMOL/L (ref 97–108)
CO2 SERPL-SCNC: 35 MMOL/L (ref 21–32)
CREAT SERPL-MCNC: 1.53 MG/DL (ref 0.55–1.02)
ERYTHROCYTE [DISTWIDTH] IN BLOOD BY AUTOMATED COUNT: 16.6 % (ref 11.5–14.5)
GLUCOSE SERPL-MCNC: 92 MG/DL (ref 65–100)
HCT VFR BLD AUTO: 31.4 % (ref 35–47)
HGB BLD-MCNC: 9.9 G/DL (ref 11.5–16)
MCH RBC QN AUTO: 28.7 PG (ref 26–34)
MCHC RBC AUTO-ENTMCNC: 31.5 G/DL (ref 30–36.5)
MCV RBC AUTO: 91 FL (ref 80–99)
NRBC # BLD: 0 K/UL (ref 0–0.01)
NRBC BLD-RTO: 0 PER 100 WBC
PLATELET # BLD AUTO: 428 K/UL (ref 150–400)
PMV BLD AUTO: 9.3 FL (ref 8.9–12.9)
POTASSIUM SERPL-SCNC: 3 MMOL/L (ref 3.5–5.1)
PREALB SERPL-MCNC: 9.7 MG/DL (ref 20–40)
RBC # BLD AUTO: 3.45 M/UL (ref 3.8–5.2)
SODIUM SERPL-SCNC: 133 MMOL/L (ref 136–145)
WBC # BLD AUTO: 14.7 K/UL (ref 3.6–11)

## 2018-05-03 PROCEDURE — 85027 COMPLETE CBC AUTOMATED: CPT | Performed by: SURGERY

## 2018-05-03 PROCEDURE — 65270000029 HC RM PRIVATE

## 2018-05-03 PROCEDURE — 80048 BASIC METABOLIC PNL TOTAL CA: CPT | Performed by: SURGERY

## 2018-05-03 PROCEDURE — 74011250636 HC RX REV CODE- 250/636: Performed by: SURGERY

## 2018-05-03 PROCEDURE — 77030018717 HC DRSG GRNUFM KCON -B

## 2018-05-03 PROCEDURE — 74011250637 HC RX REV CODE- 250/637: Performed by: NURSE PRACTITIONER

## 2018-05-03 PROCEDURE — 74011250637 HC RX REV CODE- 250/637: Performed by: SURGERY

## 2018-05-03 PROCEDURE — 74011250637 HC RX REV CODE- 250/637: Performed by: PHYSICAL MEDICINE & REHABILITATION

## 2018-05-03 PROCEDURE — 97110 THERAPEUTIC EXERCISES: CPT

## 2018-05-03 PROCEDURE — 97530 THERAPEUTIC ACTIVITIES: CPT

## 2018-05-03 PROCEDURE — 36415 COLL VENOUS BLD VENIPUNCTURE: CPT | Performed by: SURGERY

## 2018-05-03 PROCEDURE — 74011250636 HC RX REV CODE- 250/636: Performed by: NURSE PRACTITIONER

## 2018-05-03 PROCEDURE — 97535 SELF CARE MNGMENT TRAINING: CPT

## 2018-05-03 PROCEDURE — 74011250637 HC RX REV CODE- 250/637: Performed by: PHYSICIAN ASSISTANT

## 2018-05-03 PROCEDURE — 77030010520

## 2018-05-03 PROCEDURE — 97116 GAIT TRAINING THERAPY: CPT

## 2018-05-03 PROCEDURE — 84134 ASSAY OF PREALBUMIN: CPT | Performed by: SURGERY

## 2018-05-03 PROCEDURE — 74011250637 HC RX REV CODE- 250/637: Performed by: INTERNAL MEDICINE

## 2018-05-03 PROCEDURE — 97606 NEG PRS WND THER DME>50 SQCM: CPT

## 2018-05-03 RX ORDER — POTASSIUM CHLORIDE 7.45 MG/ML
10 INJECTION INTRAVENOUS
Status: COMPLETED | OUTPATIENT
Start: 2018-05-03 | End: 2018-05-16

## 2018-05-03 RX ORDER — GUAIFENESIN 100 MG/5ML
100 SOLUTION ORAL
Status: DISCONTINUED | OUTPATIENT
Start: 2018-05-03 | End: 2018-05-23 | Stop reason: HOSPADM

## 2018-05-03 RX ADMIN — LORAZEPAM 1 MG: 2 INJECTION INTRAMUSCULAR; INTRAVENOUS at 02:25

## 2018-05-03 RX ADMIN — NYSTATIN: 100000 CREAM TOPICAL at 09:50

## 2018-05-03 RX ADMIN — ONDANSETRON HYDROCHLORIDE 4 MG: 2 INJECTION INTRAMUSCULAR; INTRAVENOUS at 12:59

## 2018-05-03 RX ADMIN — POTASSIUM CHLORIDE 10 MEQ: 10 INJECTION, SOLUTION INTRAVENOUS at 14:02

## 2018-05-03 RX ADMIN — MORPHINE SULFATE 45 MG: 15 TABLET ORAL at 01:19

## 2018-05-03 RX ADMIN — MORPHINE SULFATE 45 MG: 15 TABLET ORAL at 09:41

## 2018-05-03 RX ADMIN — Medication 10 ML: at 05:41

## 2018-05-03 RX ADMIN — POTASSIUM CHLORIDE 10 MEQ: 10 INJECTION, SOLUTION INTRAVENOUS at 17:16

## 2018-05-03 RX ADMIN — GUAIFENESIN 100 MG: 200 SOLUTION ORAL at 12:48

## 2018-05-03 RX ADMIN — POTASSIUM CHLORIDE 10 MEQ: 10 INJECTION, SOLUTION INTRAVENOUS at 15:22

## 2018-05-03 RX ADMIN — PROCHLORPERAZINE EDISYLATE 5 MG: 5 INJECTION INTRAMUSCULAR; INTRAVENOUS at 23:53

## 2018-05-03 RX ADMIN — DRONABINOL 2.5 MG: 2.5 CAPSULE ORAL at 09:41

## 2018-05-03 RX ADMIN — NYSTATIN: 100000 CREAM TOPICAL at 18:05

## 2018-05-03 RX ADMIN — MORPHINE SULFATE 45 MG: 15 TABLET ORAL at 05:40

## 2018-05-03 RX ADMIN — SODIUM CHLORIDE, SODIUM LACTATE, POTASSIUM CHLORIDE, AND CALCIUM CHLORIDE 125 ML/HR: 600; 310; 30; 20 INJECTION, SOLUTION INTRAVENOUS at 12:49

## 2018-05-03 RX ADMIN — DRONABINOL 2.5 MG: 2.5 CAPSULE ORAL at 18:04

## 2018-05-03 RX ADMIN — MORPHINE SULFATE 45 MG: 15 TABLET ORAL at 18:04

## 2018-05-03 RX ADMIN — MORPHINE SULFATE 45 MG: 15 TABLET ORAL at 12:37

## 2018-05-03 RX ADMIN — MORPHINE SULFATE 100 MG: 100 TABLET, FILM COATED, EXTENDED RELEASE ORAL at 15:21

## 2018-05-03 RX ADMIN — Medication 10 ML: at 13:00

## 2018-05-03 RX ADMIN — LORAZEPAM 1 MG: 2 INJECTION INTRAMUSCULAR; INTRAVENOUS at 15:40

## 2018-05-03 RX ADMIN — Medication 10 ML: at 10:00

## 2018-05-03 RX ADMIN — SODIUM CHLORIDE, SODIUM LACTATE, POTASSIUM CHLORIDE, AND CALCIUM CHLORIDE 125 ML/HR: 600; 310; 30; 20 INJECTION, SOLUTION INTRAVENOUS at 02:26

## 2018-05-03 RX ADMIN — POTASSIUM CHLORIDE 10 MEQ: 10 INJECTION, SOLUTION INTRAVENOUS at 13:04

## 2018-05-03 RX ADMIN — ASPIRIN 325 MG: 325 TABLET ORAL at 09:41

## 2018-05-03 RX ADMIN — ENOXAPARIN SODIUM 40 MG: 100 INJECTION SUBCUTANEOUS at 09:42

## 2018-05-03 RX ADMIN — MORPHINE SULFATE 100 MG: 100 TABLET, FILM COATED, EXTENDED RELEASE ORAL at 07:25

## 2018-05-03 RX ADMIN — LORAZEPAM 1 MG: 2 INJECTION INTRAMUSCULAR; INTRAVENOUS at 22:35

## 2018-05-03 RX ADMIN — MORPHINE SULFATE 45 MG: 15 TABLET ORAL at 20:46

## 2018-05-03 RX ADMIN — Medication 10 ML: at 22:00

## 2018-05-03 RX ADMIN — ONDANSETRON HYDROCHLORIDE 4 MG: 2 INJECTION INTRAMUSCULAR; INTRAVENOUS at 00:06

## 2018-05-03 RX ADMIN — MORPHINE SULFATE 115 MG: 15 TABLET, EXTENDED RELEASE ORAL at 23:53

## 2018-05-03 NOTE — PROGRESS NOTES
NUTRITION COMPLETE ASSESSMENT    RECOMMENDATIONS:   1. Continue diet as ordered and encourage ONS (Vital 1.5 and/or Boost Glucose Control)    2. Continue strict documentation of all oral intake (fluid and supplements) as well as fistula, vac and urine output and document all in I/O's flowsheet    3. If Prealbumin to be evaluated on a regular basis, would check CRP to also evaluate inflammatory markers    4. Agree with consideration of resumption of TPN for future nutrition support secondary to questionable absorption    5. Standing scale weight daily    7. Check magnesium and phosphorus weekly     Interventions/Plan:   Food/Nutrient Delivery: Vital 1.5; Boost Glucose Control supplements    Assessment:   Reason for Assessment:   [x]Reassessment     Diet:   GI Lite + Vital 1.5 TID; Boost Glucose Control TID    Nutritionally Significant Medications: [x] Reviewed & Includes: marinol twice/day; LR @ 125 mL/hr; zofran q 4 hours prn; potassium chloride 10 mEq x 4; compazine q6 hours prn; phenergan q6 hours prn; scopolamine patch q72 hours    Meal Intake:   Patient Vitals for the past 100 hrs:   % Diet Eaten   05/02/18 1951 100 %   05/02/18 1409 100 %   05/02/18 0939 100 %   05/02/18 0924 100 %   05/01/18 1810 100 %   05/01/18 1430 100 %   05/01/18 0829 75 %   04/29/18 1339 100 %     Subjective:  Pt in good spirits, but frustrated about leakage of her bag. States her output was only \"better\" yesterday because she made herself NPO for a portion of the day. States she was able to drink 1 Vital 1.5 with the addition of chocolate syrup and one Boost Glucose Control so far today. Encouraged her to attempt both Vital 1.5 and Boost Glucose Control during the day. Objective:  Chart reviewed, discussed with RN. Pt remains off of TPN and tube feeding with po intake as sole source of nutrition. IVF running continuously. Fistula output remains very high. She is making an effort to eat and drink all that is given to her. Supplements added and adjusted during visit. As ordered, these would provide ~1250 kcal, 64 g protein per day; however, do not expect she will drink 100% of all 4. Suspect there is a good chance the pt will require resumption of TPN secondary to high fistula output when she eats. Absorption is difficult to assess (would need to collect UUN, all fistula and vac output to account for total protein losses). Fistula Output:  Today: 2700 mL so far  5/2: 4350 mL  5/1: 6800 mL (would Vac output this day was 1175 mL)    Decreased potassium-- ordered for repletion  Decreased sodium-- suspect related to GI losses    Noted Prealbumin checked. Not a great indicator of nutrition status without CRP to evaluate for inflammatory response. If this will be monitored on an ongoing basis, would check CRP. Last standing weight from 4/27. Estimated Nutrition Needs:   Kcals/day: 2041 Kcals/day (0234-4762 kcal/day (MSJ x 1-1.1))  Protein: 111 g (111-152 g/day (0.8-1.1 kg))  Fluid: 2250 ml (or 1 mL/kcal or per output)     Based On: [place] [facility]  Weight Used: Actual wt (138.6 kg (standing on 4/27))    Pt expected to meet estimated nutrient needs:  []   Yes     [x]  No    Nutrition Diagnosis:   1. 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  Less than optimal enteral nutrition related to slow tube feeding progression as evidenced by resolved (no longer on tube feedings)    Goals:     Pt to consume at least 3 oral supplements per day over the next 5-7 days     Monitoring & Evaluation:    - Liquid meal replacement, Total energy intake   - Weight/weight change     Previous Nutrition Goals Met:  N/A (no longer on TPN/tube feeding)  Previous Recommendations:      N/A    Education & Discharge Needs:   [] None Identified   [x] Identified and addressed    [x] Participated in care plan, discharge planning, and/or interdisciplinary rounds        Cultural, Faith and ethnic food preferences identified:  NONE      Skin Integrity: []Intact  [x]Other: see detailed WOCN/surgical notes  Edema: []None [x]Other: last edema documentation is from 4/30 (1+)  Last BM: No ileostomy output  Food Allergies: [x]None []Other    Anthropometrics:    Weight Loss Metrics 5/2/2018 3/7/2018 3/5/2018 2/28/2018 2/27/2018 2/27/2018 2/26/2018   Today's Wt 304 lb 8 oz - 304 lb 3.2 oz 306 lb 306 lb 8 oz - 307 lb   BMI - 52.27 kg/m2 52.22 kg/m2 52.52 kg/m2 52.61 kg/m2 - 52.7 kg/m2      Last 3 Recorded Weights in this Encounter    04/30/18 0800 05/01/18 0746 05/02/18 0702   Weight: 138 kg (304 lb 3.8 oz) 137.8 kg (303 lb 14.4 oz) 138.1 kg (304 lb 8 oz)      Weight Source: Bed  Height: 5' 4\" (162.6 cm),    Body mass index is 52.27 kg/(m^2). IBW : 54.4 kg (120 lb),    Usual Body Weight: 135.2 kg (298 lb) (1/2018),      Labs:    Lab Results   Component Value Date/Time    Sodium 133 (L) 05/03/2018 01:27 AM    Potassium 3.0 (L) 05/03/2018 01:27 AM    Chloride 88 (L) 05/03/2018 01:27 AM    CO2 35 (H) 05/03/2018 01:27 AM    Glucose 92 05/03/2018 01:27 AM    BUN 11 05/03/2018 01:27 AM    Creatinine 1.53 (H) 05/03/2018 01:27 AM    Calcium 8.8 05/03/2018 01:27 AM    Magnesium 1.7 04/29/2018 04:33 AM    Phosphorus 4.3 04/29/2018 04:33 AM    Albumin 2.1 (L) 05/01/2018 01:20 AM     No results found for: HBA1C, HGBE8, NCI8ZDQB, RYO9OMQU, IUO6GRWD  Lab Results   Component Value Date/Time    Glucose 92 05/03/2018 01:27 AM    Glucose (POC) 95 04/24/2018 06:27 AM      Lab Results   Component Value Date/Time    ALT (SGPT) 17 05/01/2018 01:20 AM    AST (SGOT) 11 (L) 05/01/2018 01:20 AM    Alk.  phosphatase 102 05/01/2018 01:20 AM    Bilirubin, direct 0.1 07/07/2009 06:38 PM    Bilirubin, total 0.5 05/01/2018 01:20 AM      [address] [place] [address]

## 2018-05-03 NOTE — PROGRESS NOTES
Progress Note    Patient: Gray Buchanan MRN: [id number]  SSN: [id number]    YOB: 1975  Age: 38 y.o. Sex: female      Admit Date: 3/7/2018    21 Days Post-Op    Procedure:  Procedure(s):  OPEN JEJUNOSTOMY TUBE PLACEMENT UNDER FLURO/ WOUND VAC CHANGE. Subjective:     Patient without new complaints. Dressing has been replaced. Objective:     Visit Vitals    BP 92/56    Pulse (!) 106    Temp 98.5 °F (36.9 °C)    Resp 18    Ht 5' 4\" (1.626 m)    Wt 304 lb 8 oz (138.1 kg)    SpO2 93%    BMI 52.27 kg/m2       Temp (24hrs), Av.6 °F (37 °C), Min:98.4 °F (36.9 °C), Max:98.8 °F (37.1 °C)      Physical Exam:    Gen- Alert in NAD  Abd- Pouch in place with significant drainage. Soft non-tender. Data Review: images and reports reviewed    Lab Review: All lab results for the last 24 hours reviewed.   Recent Results (from the past 24 hour(s))   METABOLIC PANEL, BASIC    Collection Time: 18  1:27 AM   Result Value Ref Range    Sodium 133 (L) 136 - 145 mmol/L    Potassium 3.0 (L) 3.5 - 5.1 mmol/L    Chloride 88 (L) 97 - 108 mmol/L    CO2 35 (H) 21 - 32 mmol/L    Anion gap 10 5 - 15 mmol/L    Glucose 92 65 - 100 mg/dL    BUN 11 6 - 20 MG/DL    Creatinine 1.53 (H) 0.55 - 1.02 MG/DL    BUN/Creatinine ratio 7 (L) 12 - 20      GFR est AA 46 (L) >60 ml/min/1.73m2    GFR est non-AA 38 (L) >60 ml/min/1.73m2    Calcium 8.8 8.5 - 10.1 MG/DL   CBC W/O DIFF    Collection Time: 18  1:27 AM   Result Value Ref Range    WBC 14.7 (H) 3.6 - 11.0 K/uL    RBC 3.45 (L) 3.80 - 5.20 M/uL    HGB 9.9 (L) 11.5 - 16.0 g/dL    HCT 31.4 (L) 35.0 - 47.0 %    MCV 91.0 80.0 - 99.0 FL    MCH 28.7 26.0 - 34.0 PG    MCHC 31.5 30.0 - 36.5 g/dL    RDW 16.6 (H) 11.5 - 14.5 %    PLATELET 992 (H) 050 - 400 K/uL    MPV 9.3 8.9 - 12.9 FL    NRBC 0.0 0  WBC    ABSOLUTE NRBC 0.00 0.00 - 0.01 K/uL       Assessment:     Hospital Problems  Date Reviewed: 10/27/2017          Codes Class Noted POA    Small bowel fistula ICD-10-CM: T59.5  ICD-9-CM: 569.81  3/7/2018 Unknown              Plan/Recommendations/Medical Decision Making:   Strict I &O to help determine IVF need - Creatinine now slightly lower  Will get Prealbumin- Will need to follow this to see if it is dropping with maximum diet efforts at that point may require TPN but patient could be sent to rehab prior to this  WBC- no clinical sign of infection-Will follow.      Signed By: Gloria Clemente MD     May 3, 2018

## 2018-05-03 NOTE — PROGRESS NOTES
Problem: Self Care Deficits Care Plan (Adult)  Goal: *Acute Goals and Plan of Care (Insert Text)  Occupational Therapy Goals  Revised 4/27/18  1. Patient will complete bathing upper body to knees with setup within 7 days upgraded 4/27  2. Patient will complete bariatric BSC transfer with supervision bariatric RW within 7 days. Upgraded 4/27  3. Patient will complete lower body dressing activities with supervision within 7 days using AE  4. Patient will perform standing for clothing management tasks (toileting or dressing) supervision bariatric RW within 7 days. Upgraded 4/27  5. Patient will complete light resistance band UE exercise program independently within 7 days. Upgraded 4/27/18    Reevaluated 4/20/2018  1. Patient will complete grooming activity sitting EOB without support for 10 minutes within 7 days  2. Patient will complete BSC transfer with min A x 1 persons within 7 days. 3. Patient will complete lower body dressing activities with supervision within 7 days using AE  4. Patient will perform standing for clothing management tasks (toileting or dressing) with min A x 1 persons within 7 days. 5. Patient will complete UE exercise program independently within 7 days. Reevaluated 4/10/2018  1. Patient will complete grooming activity sitting EOB without support for 10 minutes within 7 days. 2.  Patient will complete BSC transfer with min A x 2 persons within 7 days. 3.  Patient will complete lower body dressing activities with min A within 7 days. 4.  Patient will perform standing for clothing management tasks (toileting or dressing) with min A x 2 persons within 7 days. 5.  Patient will complete UE exercise program independently within 7 days. Initiated 4/4/2018  1. Patient will perform rolling and pulling self up to head of bed with min assist to assist with ADL's at bed level within 7 day(s).   2.  Patient will perform static sitting edge of bed > or = 10 minutes with supervision/set-up within 7 day(s). 3.  Patient will perform static standing with bilateral UE support on rolling walker > or = 3 minutes with minimal assistance/contact guard assist x's 2 within 7 day(s). 4.  Patient will perform toilet transfers with moderate assistance x's 2 to bedside commode within 7 days. 5.  Patient will perform bilateral UE AROM and strengthening exercises throughout day and grade exercises prn to increase demand within 7 day(s). Occupational Therapy TREATMENT  Patient: Billy Chandler (38 y.o. female)  Date: 5/3/2018  Diagnosis: FISTULA  Small bowel fistula  INTEROCUTANOUS FISTULA <principal problem not specified>  Procedure(s) (LRB):  OPEN JEJUNOSTOMY TUBE PLACEMENT UNDER FLURO/ WOUND VAC CHANGE. (N/A) 21 Days Post-Op  Precautions: Fall, Skin, Contact  Chart, occupational therapy assessment, plan of care, and goals were reviewed. ASSESSMENT:  Patient is limited by impaired activity tolerance, impaired endurance, and impaired functional reach to LB, fear of falling, resulting in impaired functional mobility and impaired ADL independence. Patient received sitting in bed-in-chair position, alert and agreeable to therapy, reporting feeling fatigued. Patient receptive to education on energy conservation and provided with handout. Patient performed BUE therapeutic exercises with theraband to promote UB strengthening required for mobility and self-care. Patient performed 10 reps of following: anterior elbow extension, lateral elbow extension, overhead elbow extension, shoulder horizontal abduction, and elbow flexion. Patient declined attempting ADLs at this time, reports familiarity with donning/ doffing socks with AE and describes method correctly. Patient receptive to explanation for using reacher for donning underwear, although declined attempting at this time.    Patient encouraged to attempt [facility] transfer but patient tearful and declined due to preoccupation with J-tube leaking and fear of falling. RN notified of J-tube leak. Will continue to progress standing ADLs and exercises OOB as tolerated, continue to recommend IP rehab as patient is motivated and below functional baseline. Progression toward goals:  []       Improving appropriately and progressing toward goals  [x]       Improving slowly and progressing toward goals  []       Not making progress toward goals and plan of care will be adjusted     PLAN:  Patient continues to benefit from skilled intervention to address the above impairments. Continue treatment per established plan of care. Discharge Recommendations:  Inpatient Rehab  Further Equipment Recommendations for Discharge: TBD     SUBJECTIVE:   Patient stated I'm just so exhausted and frustrated.     OBJECTIVE DATA SUMMARY:   Cognitive/Behavioral Status:  Neurologic State: Alert  Orientation Level: Oriented X4  Cognition: Follows commands; Appropriate decision making; Appropriate safety awareness; Appropriate for age attention/concentration  Perception: Appears intact  Perseveration: No perseveration noted  Safety/Judgement: Good awareness of safety precautions; Insight into deficits; Awareness of environment    Functional Mobility and Transfers for ADLs:  Bed Mobility:  Scooting: Supervision (to [facility], using hand rails, in Trendelenburg)       Balance:  Sitting: Intact; With support    ADL Intervention:       Cognitive Retraining  Safety/Judgement: Good awareness of safety precautions; Insight into deficits; Awareness of environment       Therapeutic Exercises:   Patient performed BUE therapeutic exercises with theraband to promote UB strengthening required for mobility and self-care. Patient performed 10 reps of following: anterior elbow extension, lateral elbow extension, overhead elbow extension, shoulder horizontal abduction, and elbow flexion.       Pain:  Pain Scale 1: Numeric (0 - 10)  Pain Intensity 1: 7  Pain Location 1: Abdomen  Pain Orientation 1: Mid  Pain Description 1: Sharp  Pain Intervention(s) 1: Medication (see MAR)  Nursing managing    Activity Tolerance:   VSS    After treatment:   [] Patient left in no apparent distress sitting up in chair  [x] Patient left in no apparent distress in bed  [x] Call bell left within reach  [x] Nursing notified  [] Caregiver present  [] Bed alarm activated    COMMUNICATION/COLLABORATION:   The patients plan of care was discussed with: Registered Nurse    Ally Johnson OT  Time Calculation: 33 mins

## 2018-05-03 NOTE — WOUND CARE
WOCN Note:     Paged by Stephanie Dennis, RN, for loss of pouch that was placed yesterday. Central abdominal surgical wound with fistula - no changes since seen yesterday. Pouch attempt unsuccessful and applied VAC dressing using 1 black sponge with hole cut out to relieve stomatized fistula. 3 Hawk's rings and paste used to get seal of 50 mmHg continuous suction. 2-piece pouch applied to capture fistula output and connected to wall suction.      Will continue to follow as needed while admitted to hospital.     YOLANDA GreeneN, RN, [facility] [place]  Certified Wound, Ostomy, Continence Nurse  office [phone]  pager [phone] or call  to page

## 2018-05-03 NOTE — PROGRESS NOTES
Bedside and Verbal shift change report given to [address]  (oncoming nurse) by Arlyn Fraser (offgoing nurse). Report included the following information SBAR and Kardex.

## 2018-05-03 NOTE — PROGRESS NOTES
Problem: Mobility Impaired (Adult and Pediatric)  Goal: *Acute Goals and Plan of Care (Insert Text)  Physical Therapy Goals  Continue progress towards current goals 4/26/18  Update 4/18/2018  1. Patient will move from supine to sit and sit to supine  in bed with minimal assistance  within 7 day(s). 2.  Patient will transfer from bed to chair and chair to bed with minimal assistance  using the least restrictive device within 7 day(s). 3.  Patient will perform sit to stand with SBA within 7 days  4. Patient will ambulate 150' with SBA x 1 using LRAD within 7 days. Update 4/6/2018  1. Patient will move from supine to sit and sit to supine  in bed with moderate assistance  within 7 day(s). 2.  Patient will transfer from bed to chair and chair to bed with moderate assistance  using the least restrictive device within 7 day(s). 3.  Patient will perform sit to stand with CGA within 7 days  4. Patient will ambulate 22' with moderate assist x 1 using LRAD within 7 days. Revised 3/27/2018  1. Patient will move from supine to sit and sit to supine  in bed with moderate assistance  within 7 day(s). 2.  Patient will transfer from bed to chair and chair to bed with moderate assistance  using the least restrictive device within 7 day(s). 3.  Patient will perform sit to stand with moderate assistance  within 7 day(s). 4.  Patient will ambulate 100' with moderate assist x1 using the least restrictive device within 7 day(s). Revisited 3/19/2018, goals remain appropriate, carry over    Physical Therapy Goals  Initiated 3/8/2018  1. Patient will move from supine to sit and sit to supine  in bed with moderate assistance  within 7 day(s). 2.  Patient will transfer from bed to chair and chair to bed with moderate assistance  using the least restrictive device within 7 day(s). 3.  Patient will perform sit to stand with moderate assistance  within 7 day(s).   4.  PT will assess gait with the least restrictive device within 7 day(s). physical Therapy TREATMENT  Patient: Mesha Javed (38 y.o. female)  Date: 5/3/2018  Diagnosis: FISTULA  Small bowel fistula  INTEROCUTANOUS FISTULA <principal problem not specified>  Procedure(s) (LRB):  OPEN JEJUNOSTOMY TUBE PLACEMENT UNDER FLURO/ WOUND VAC CHANGE. (N/A) 21 Days Post-Op  Precautions: Fall, Skin, Contact  Chart, physical therapy assessment, plan of care and goals were reviewed. ASSESSMENT:  Pt is having significant drainage utilizing suction in ostomy. Pt was able to clamp bag to ambulate. Did not perform bed mobility due to concerns that it would increase bandage leaking. Pt was agreeable to gait without the use of rolling walker. Pt required a HHA and pt reaching for objects. Pt reports increase knee pain and stability. Poor foot clearance with a shuffling gait. Pt could benefit from inpt rehab to progress mobility and independence. Progression toward goals:  [x]    Improving appropriately and progressing toward goals  []    Improving slowly and progressing toward goals  []    Not making progress toward goals and plan of care will be adjusted     PLAN:  Patient continues to benefit from skilled intervention to address the above impairments. Continue treatment per established plan of care. Discharge Recommendations:  Inpatient Rehab  Further Equipment Recommendations for Discharge:  TBD     SUBJECTIVE:   Patient stated Gianni Earlysonali can try to clamp it.     OBJECTIVE DATA SUMMARY:   Critical Behavior:  Neurologic State: Alert  Orientation Level: Oriented X4  Cognition: Follows commands  Safety/Judgement: Awareness of environment, Good awareness of safety precautions  Functional Mobility Training:  Bed Mobility:      utilized bed in chair position to protect bandage and prevent leaking                Transfers:  Sit to Stand: Contact guard assistance  Stand to Sit: Contact guard assistance                             Balance:  Standing: Impaired  Ambulation/Gait Training:  Distance (ft): 35 Feet (ft)  Assistive Device:  (HHA + furnature walking)  Ambulation - Level of Assistance: Minimal assistance        Gait Abnormalities: Antalgic;Decreased step clearance        Base of Support: Widened     Speed/Liz: Shuffled; Slow  Step Length: Left shortened;Right shortened                Stairs:              Neuro Re-Education:    Therapeutic Exercises:     Pain:  Pain Scale 1: Numeric (0 - 10)  Pain Intensity 1: 7  Pain Location 1: Abdomen  Pain Orientation 1: Mid  Pain Description 1: Sharp  Pain Intervention(s) 1: Medication (see MAR)  Activity Tolerance:   Limited   Please refer to the flowsheet for vital signs taken during this treatment.   After treatment:   []    Patient left in no apparent distress sitting up in chair  [x]    Patient left in no apparent distress in bed-in chair position   [x]    Call bell left within reach  [x]    Nursing notified  []    Caregiver present  []    Bed alarm activated    COMMUNICATION/COLLABORATION:   The patients plan of care was discussed with: Registered Nurse    Penny Savage PTA   Time Calculation: 23 mins

## 2018-05-03 NOTE — ADT AUTH CERT NOTES
Patient Demographics        Patient Name [address] Sex  Address Phone     Abena Sierra Mar [phone] Female 1975 [address] Zeny Corea  [phone] (Home)  [phone] (Work)  [phone] (Mobile) *Preferred*           CSN:       [id number]           Admit Date: Admit Time Room Bed       Mar 7, 2018  8:17  [[id number]] 01 [[id number]]           Attending Providers        Provider Pager From Rudy Brand MD  18            Emergency Contact(s)        Name Relation Home Work Mobile     Manish Warren Mother [phone]       Landon Cole Parent [phone]           Utilization Review           Additonal Clinical information 2018 by Olamide Chan RN        Review Status Review Entered       In Primary 5/3/2018       Details         Care date 2018  Dr. Lori Townsend from 2018-  \"\"Looks like the surgeon is concerned about this pt's rising creatinine, declining Na+, Persistent tachycardia, elevated temp etc.   This is worrisome for possible DVT, PNA etc., especially in a patient who has been here about a month soon.  ''     Additional Clinical  Labs,  WBC 13.1, elevated hgb 9.9, HcT 31.9,  low,  K 2.9 low, HR from 109-118-  Nurse Note- Time-- au -ABD  Wound dressed x 2 since 9pm -large amt yellow drainage------     Occupational Therapy Note- OCCUPATIONAL THERAPY TREATMENT  Patient: Octavia Kwong (42 y.o. female)  Date: 2018  Diagnosis: FISTULA  Small bowel fistula  INTEROCUTANOUS FISTULA <principal problem not specified>  Procedure(s) (LRB):  OPEN JEJUNOSTOMY TUBE PLACEMENT UNDER FLURO/ WOUND VAC CHANGE. (N/A) 20 Days Post-Op  Precautions: Fall, Skin, Contact  Chart, occupational therapy assessment, plan of care, and goals were reviewed.     ASSESSMENT:  Progresses well with co-tx of therapies and continued focused on encouragement through the tasks, able to complete bed mobility with min A and functional mobility from EOB to [facility] on other side of bed with HHA x2, fearful and tearful with BSC transfer but able to complete with mod Ax1 off BSC for trial, continues to want to use female urinal and hygiene in bed due to difficulty with squatting for hygiene on BSC, discussed various adaptive ways for hygiene due to anxiety around hygiene with BSC use and falls. Will continue to progress standing ADLs and exercises OOB as tolerated, continue to recommend IP rehab, motivated  And excellent candidate  Progression toward goals:         Improving appropriately and progressing toward goals         Improving slowly and progressing toward goals         Not making progress toward goals and plan of care will be adjusted      PLAN:  Patient continues to benefit from skilled intervention to address the above impairments.  Continue treatment per established plan of care. Discharge Recommendations:  Inpatient Rehab  Further Equipment Recommendations for Discharge:  TBD at rehab      SUBJECTIVE:   Patient stated I can't clean on that thing.      OBJECTIVE DATA SUMMARY:   Cognitive/Behavioral Status:      alert     Functional Mobility and Transfers for ADLs:  Bed Mobility:  Supine to Sit: Minimum assistance     Transfers:  Sit to Stand: Minimum assistance; Additional time;Assist x1  Functional Transfers  Toilet Transfer : Minimum assistance; Additional time  Bed to Chair: Minimum assistance; Additional time;Assist x1     Balance:  Sitting: Intact  Standing: Impaired; With support (HHA)  Standing - Static: Constant support;Good     ADL Intervention:          Focused session on what patient can do for herself with encouragement through anxiety and fear of BSC/falls.  Patient agreeable with x2 assist, good balance with mobility in room, min A on BSC then mod A with gentle boost to stand off BSC with patient tearful at first but understanding of what she accomplished, expected bright affect post goal however remained focused on anxiety with other things going on. Left up in chair with PTA for exercises     Toileting  Bladder Hygiene: Supervision/set-up; Total assistance (dependent) (in bed remains total A if standing)  Clothing Management: Total assistance (dependent) (if standing)   Physical Therapy Note-  PHYSICAL THERAPY TREATMENT  Patient: Octavia Kwong (42 y.o. female)  Date: 5/2/2018  Diagnosis: FISTULA  Small bowel fistula  INTEROCUTANOUS FISTULA <principal problem not specified>  Procedure(s) (LRB):  OPEN JEJUNOSTOMY TUBE PLACEMENT UNDER FLURO/ WOUND VAC CHANGE. (N/A) 20 Days Post-Op  Precautions: Fall, Skin, Contact  Chart, physical therapy assessment, plan of care and goals were reviewed.     ASSESSMENT:  Pt has anxiety with certain tasks due to fear and history of falls. Co treated with OT to encourage BSC attempt. Pt was able to ambulate without rolling walker but was utilizing HHA and furniture walking. Post completion with OT pt was able to ambulate with rolling walker in the hallway. Pt has had limited tolerance since back pain. Pt required min A for bed mobility and transfer. Pt could benefit from inpt rehab to progress her mobility and independence. Progression toward goals:      Improving appropriately and progressing toward goals      Improving slowly and progressing toward goals      Not making progress toward goals and plan of care will be adjusted      PLAN:  Patient continues to benefit from skilled intervention to address the above impairments.  Continue treatment per established plan of care. Discharge Recommendations:  Inpatient Rehab  Further Equipment Recommendations for Discharge:  TBD      SUBJECTIVE:   Patient stated I dont want to get stuck.   On BSC     OBJECTIVE DATA SUMMARY:   Critical Behavior:  Neurologic State: Alert  Orientation Level: Oriented X4  Cognition: Follows commands  Safety/Judgement: Awareness of environment, Good awareness of safety precautions  Functional Mobility Training:  Bed Mobility:     Supine to Sit: Minimum assistance              Transfers:  Sit to Stand: Minimum assistance; Additional time;Assist x1        Bed to Chair: Minimum assistance; Additional time;Assist x1                 Balance:  Sitting: Intact  Standing: Impaired; With support (HHA)  Standing - Static: Constant support;Good  Ambulation/Gait Training:  Distance (ft): 60 Feet (ft) (20 HHA/Min A)  Assistive Device: Walker, rolling  Ambulation - Level of Assistance: Contact guard assistance (min A with HHA with No A. D)        Gait Abnormalities: Decreased step clearance        Base of Support: Widened     Speed/Liz: Slow  Step Length: Left shortened;Right shortened                    Stairs:              Neuro Re-Education:     Therapeutic Exercises:   Pt defer standing due to fear of left ankle pain. Pain:  Pain Scale 1: Numeric (0 - 10)  Pain Intensity 1: 3              Activity Tolerance:   Limited   Please refer to the flowsheet for vital signs taken during this treatment.   After treatment:       Patient left in no apparent distress sitting up in chair      Patient left in no apparent distress in bed      Call bell left within reach      Nursing notified      Caregiver present      Bed alarm activated     COMMUNICATION/COLLABORATION:   The patients plan of care was discussed with: Registered Nurse     Rufina Devries PTA   Time Calculation: 38 mins                General Surgery or Procedure GRG - Care Day 57 (5/2/2018) by Rafi Caro RN        Review Status Review Entered       Completed 5/2/2018       Details              Care Day: 57 Care Date: 5/2/2018 Level of Care: Inpatient Floor       Guideline Day 3        Level Of Care       ( ) * Activity level acceptable       (X) Floor to discharge       5/2/2018 1:16 PM EDT by Clarisse Lyons         Floor                     Clinical Status       ( ) * Operative site and other wounds acceptable       ( ) * Pain and nausea absent or adequately managed       (X) * Temperature status acceptable       2018 1:16 PM EDT by Ana Méndez         Temp 98.3,              ( ) * No infection, or status acceptable       ( ) * No blood loss, or problem resolved       ( ) * Abdominal status acceptable       ( ) * Hepatic and biliary abnormalities absent or acceptable       ( ) * Inflammation absent or stable (eg, colitis)       ( ) * No transplant done, or status acceptable       ( ) * General Discharge Criteria met              Interventions       ( ) * Intake acceptable       ( ) * No inpatient interventions needed              2018 1:20 PM EDT by Ana Méndez       Subject: Additional Clinical Information       Care Management Note-Reviewed medical chart and spoke with the attending MD.   The patient's lab work is still not stable today for discharge.   Spoke with the [facility] liaison, Aixa Hatch, to update her (S#[phone]).   Aixa Hatch explained that the patient's authorization will , and she will need a new authorization.     [facility] cannot request a new authorization until the patient is officially deemed medically stable, however. Jeremias Acosta explained that she will follow the patient through the weekend and will request authorization as of next week if she is stable then.   Care Management will continue to follow her disposition. Evelyn Acevedo, MSHUSAM                2018 1:19 PM EDT by Ana Méndez       Subject: Additional Clinical Information        -Wound Care Note-Follow-up visit for [facility] dressing change and fistula pouching.   Chart shows:Admitted for fistula takdown 3/7/18 by Dr. Jake Sampson with [facility] placement in [address] [place] [address]; history of multiple abdominal surgeries and Crohns disease. Admitted from home.  Mother able to provide assistance in pouching prior to admission and became very competent in doing so.   Seen today with Dr. Amanda Crandall.    Assessment: Patient is A&O x 4, continent and mobile - moves around room with assistance & Love Hews been working with PT.     Bed: total care bariatric    1. Midline abdominal  surgical wound = 12  x 11.5  x 1.8 cm70% pink/red  granular  wound base and 30% stomatized mucus membrane centrally with   peristalsis and output @ 4 o'clock. 1 piece of black sponge with central hole to allow for pouching of the fistulas; 3  Hawk's rings placed around hole top and bottom to achieve seal.   After seal achieved, a 2-piece pouch was placed to collect fistula output. 50 mmHg continuous suction.     Wound Recommendations:   Maintain VAC as ordered.   Discussed above plan with patient, RN, Adam Arroyo, and Dr. Miky Richards.  Transition of Care: Plan to follow weekly as needed while admitted to hospital. Possible discharge on Friday depending on labs and status of IV possibilities @ [facility].   Wound is small enough to begin pouching - have reached out to vendor about a special order.              5/2/2018 1:16 PM EDT by Mitch Canas       Subject: Additional Clinical Information       Procedure:   Procedure(s):OPEN JEJUNOSTOMY TUBE PLACEMENT UNDER FLURO/ WOUND VAC CHANGE--V. S.108/57, pulse 113, 98.3, 18,Labs,, K 2.9, chloride 88, glucose 138, BUN 11, creatinine 1.59, WBC 13.1, hgb 9.9, HcT 31.9,Physical Exam:   Gen- Alert in NADAbd- wound granulating well there is active drainage  ---Plan-Plan/Recommendations/Medical Decision Making:Creatinine continues to rise. Will continue IVF-   She will need to be discharged with IVF at any rehab facility.  Continue diet and supplements.  Medications,aspirin 325mg PO daily,LR at 125cc/hr IV continuous,MS contin 100 mg PO q 24 hours,Compazine 5mg IV x1,                                   * Milestone                  General Surgery or Procedure GRG - Care Day 55 (4/30/2018) by Keyana cMgee RN        Review Status Review Entered       Completed 5/1/2018       Details              Care Day: 55 Care Date: 4/30/2018 Level of Care: Inpatient Floor       Guideline Day 3        Level Of Care       ( ) * Activity level acceptable       (X) Floor to discharge       5/1/2018 3:31 PM EDT by Shandra Gonzalez         Floor                     Clinical Status       ( ) * Operative site and other wounds acceptable       ( ) * Pain and nausea absent or adequately managed       ( ) * Temperature status acceptable       ( ) * No infection, or status acceptable       ( ) * No blood loss, or problem resolved       ( ) * Abdominal status acceptable       ( ) * Hepatic and biliary abnormalities absent or acceptable       ( ) * Inflammation absent or stable (eg, colitis)       ( ) * No transplant done, or status acceptable       ( ) * General Discharge Criteria met              Interventions       ( ) * Intake acceptable       ( ) * No inpatient interventions needed              5/1/2018 3:33 PM EDT by Shandra Gonzalez       Subject: Additional Clinical Information       Wound Care Nurse- Ck Reid---Discussed discharge needs re: wound & fistula management with request for listing of supplies from Michael Elder, [address] [place] [address] liaison.   Nyoka Leaven are as follows:Medium black granufoam for [employer]CI VAC systemcanisters & NPWT pump4 Hawk's rings per dressing change2.75\" 2-piece fecal pouchTube secure for feeding tube that allows red rubber to come out of pouch (one sent with her and we wash and reuse this with pouch changes)Tube secure onto skin in LUQ (one left in place)Adhesive remover - very fragile skin surrounding the VACMarathon - have used as needed with skin breakdown around [address] is helpful to have wall suction set up during dressing change as this is a high output fistula.  Colosotmy: flat pouch; 1 or 2 piece                5/1/2018 3:31 PM EDT by Shandra Gonzalez       Subject: Additional Clinical Information       V. S.99.1, 101, 104/69, Resp. 16, 94% RA,-Intake/Output Summary (Last 24 hours) at 04/30/18 0854-Last data filed at 04/30/18 0654-  Gross per 24 hour-Intake-                       1490 ml-Output                       9537 -Net                     -4615 --Exam: Cor: RRR.                       Lungs: Bilateral breath sounds. Clear to auscultation.                      Abd: Soft. Non distended.                         Non tender. Wound vac in place with a leak---Diet as tolerated. Continue tube feeds and increase as able. Saline lock IVF. Wound care following. Palliative Care following. Discharge planning in progress. Will remove PICC line prior to discharge. ---Medications,Aspirin 325mg PO daily,Marinol 2.5mg PO 2 times daily,Lovenox 40mg SC q 24 hours,Ativan 1mg IV x2,MS Contin 100mg PO q 24 hours,MS Contin 115mg PO q bedtime,MS IR tablet 45mg PO q 4 hours,Zofran 4mg IV x1,Compazine 5mg IV x2,LR at 50cc/hr IV continuous,                                   * Milestone

## 2018-05-03 NOTE — PROGRESS NOTES
Unable to keep up with large amount of leakage from under dressing. Dressing removed and area packed per pt and mothers specifications, pt premedicated with dilaudid iv per request. Pt very tearful during dressing change due to lot of tenderness and excoriated skin, marathon applied to many areas.

## 2018-05-04 LAB
ANION GAP SERPL CALC-SCNC: 9 MMOL/L (ref 5–15)
BUN SERPL-MCNC: 10 MG/DL (ref 6–20)
BUN/CREAT SERPL: 6 (ref 12–20)
CALCIUM SERPL-MCNC: 8.3 MG/DL (ref 8.5–10.1)
CHLORIDE SERPL-SCNC: 85 MMOL/L (ref 97–108)
CO2 SERPL-SCNC: 35 MMOL/L (ref 21–32)
CREAT SERPL-MCNC: 1.65 MG/DL (ref 0.55–1.02)
ERYTHROCYTE [DISTWIDTH] IN BLOOD BY AUTOMATED COUNT: 16.8 % (ref 11.5–14.5)
GLUCOSE SERPL-MCNC: 112 MG/DL (ref 65–100)
HCT VFR BLD AUTO: 28.2 % (ref 35–47)
HGB BLD-MCNC: 8.9 G/DL (ref 11.5–16)
MCH RBC QN AUTO: 28.7 PG (ref 26–34)
MCHC RBC AUTO-ENTMCNC: 31.6 G/DL (ref 30–36.5)
MCV RBC AUTO: 91 FL (ref 80–99)
NRBC # BLD: 0 K/UL (ref 0–0.01)
NRBC BLD-RTO: 0 PER 100 WBC
PLATELET # BLD AUTO: 372 K/UL (ref 150–400)
PMV BLD AUTO: 9.5 FL (ref 8.9–12.9)
POTASSIUM SERPL-SCNC: 3.1 MMOL/L (ref 3.5–5.1)
RBC # BLD AUTO: 3.1 M/UL (ref 3.8–5.2)
SODIUM SERPL-SCNC: 129 MMOL/L (ref 136–145)
WBC # BLD AUTO: 11.8 K/UL (ref 3.6–11)

## 2018-05-04 PROCEDURE — 36415 COLL VENOUS BLD VENIPUNCTURE: CPT | Performed by: SURGERY

## 2018-05-04 PROCEDURE — 74011250636 HC RX REV CODE- 250/636: Performed by: NURSE PRACTITIONER

## 2018-05-04 PROCEDURE — 77030010520

## 2018-05-04 PROCEDURE — 74011250637 HC RX REV CODE- 250/637: Performed by: INTERNAL MEDICINE

## 2018-05-04 PROCEDURE — 97606 NEG PRS WND THER DME>50 SQCM: CPT

## 2018-05-04 PROCEDURE — 97530 THERAPEUTIC ACTIVITIES: CPT

## 2018-05-04 PROCEDURE — 77030019952 HC CANSTR VAC ASST KCON -B

## 2018-05-04 PROCEDURE — 74011250636 HC RX REV CODE- 250/636: Performed by: SURGERY

## 2018-05-04 PROCEDURE — 85027 COMPLETE CBC AUTOMATED: CPT | Performed by: SURGERY

## 2018-05-04 PROCEDURE — 74011250637 HC RX REV CODE- 250/637: Performed by: NURSE PRACTITIONER

## 2018-05-04 PROCEDURE — 65270000029 HC RM PRIVATE

## 2018-05-04 PROCEDURE — 97535 SELF CARE MNGMENT TRAINING: CPT

## 2018-05-04 PROCEDURE — 74011250636 HC RX REV CODE- 250/636: Performed by: PHYSICIAN ASSISTANT

## 2018-05-04 PROCEDURE — 74011250637 HC RX REV CODE- 250/637: Performed by: PHYSICIAN ASSISTANT

## 2018-05-04 PROCEDURE — 77030018717 HC DRSG GRNUFM KCON -B

## 2018-05-04 PROCEDURE — 74011250637 HC RX REV CODE- 250/637: Performed by: PHYSICAL MEDICINE & REHABILITATION

## 2018-05-04 PROCEDURE — 97116 GAIT TRAINING THERAPY: CPT

## 2018-05-04 PROCEDURE — 80048 BASIC METABOLIC PNL TOTAL CA: CPT | Performed by: SURGERY

## 2018-05-04 RX ORDER — POTASSIUM CHLORIDE 7.45 MG/ML
10 INJECTION INTRAVENOUS
Status: DISPENSED | OUTPATIENT
Start: 2018-05-04 | End: 2018-05-04

## 2018-05-04 RX ORDER — POTASSIUM CHLORIDE 7.45 MG/ML
10 INJECTION INTRAVENOUS
Status: COMPLETED | OUTPATIENT
Start: 2018-05-04 | End: 2018-05-16

## 2018-05-04 RX ADMIN — MORPHINE SULFATE 100 MG: 100 TABLET, FILM COATED, EXTENDED RELEASE ORAL at 09:25

## 2018-05-04 RX ADMIN — POTASSIUM CHLORIDE 10 MEQ: 10 INJECTION, SOLUTION INTRAVENOUS at 16:22

## 2018-05-04 RX ADMIN — DRONABINOL 2.5 MG: 2.5 CAPSULE ORAL at 18:15

## 2018-05-04 RX ADMIN — Medication 10 ML: at 22:20

## 2018-05-04 RX ADMIN — MORPHINE SULFATE 45 MG: 15 TABLET ORAL at 06:24

## 2018-05-04 RX ADMIN — DRONABINOL 2.5 MG: 2.5 CAPSULE ORAL at 09:25

## 2018-05-04 RX ADMIN — POTASSIUM CHLORIDE 10 MEQ: 10 INJECTION, SOLUTION INTRAVENOUS at 14:37

## 2018-05-04 RX ADMIN — ENOXAPARIN SODIUM 40 MG: 100 INJECTION SUBCUTANEOUS at 09:26

## 2018-05-04 RX ADMIN — MORPHINE SULFATE 115 MG: 15 TABLET, EXTENDED RELEASE ORAL at 23:32

## 2018-05-04 RX ADMIN — Medication 10 ML: at 05:55

## 2018-05-04 RX ADMIN — POTASSIUM CHLORIDE 10 MEQ: 10 INJECTION, SOLUTION INTRAVENOUS at 11:17

## 2018-05-04 RX ADMIN — Medication 10 ML: at 06:00

## 2018-05-04 RX ADMIN — MORPHINE SULFATE 45 MG: 15 TABLET ORAL at 14:51

## 2018-05-04 RX ADMIN — MORPHINE SULFATE 100 MG: 100 TABLET, FILM COATED, EXTENDED RELEASE ORAL at 16:27

## 2018-05-04 RX ADMIN — MORPHINE SULFATE 45 MG: 15 TABLET ORAL at 22:19

## 2018-05-04 RX ADMIN — ONDANSETRON HYDROCHLORIDE 4 MG: 2 INJECTION INTRAMUSCULAR; INTRAVENOUS at 21:36

## 2018-05-04 RX ADMIN — POTASSIUM CHLORIDE 10 MEQ: 10 INJECTION, SOLUTION INTRAVENOUS at 12:31

## 2018-05-04 RX ADMIN — Medication 10 ML: at 11:19

## 2018-05-04 RX ADMIN — MORPHINE SULFATE 45 MG: 15 TABLET ORAL at 19:40

## 2018-05-04 RX ADMIN — MORPHINE SULFATE 45 MG: 15 TABLET ORAL at 02:48

## 2018-05-04 RX ADMIN — MORPHINE SULFATE 45 MG: 15 TABLET ORAL at 11:14

## 2018-05-04 RX ADMIN — LORAZEPAM 1 MG: 2 INJECTION INTRAMUSCULAR; INTRAVENOUS at 06:24

## 2018-05-04 RX ADMIN — SODIUM CHLORIDE, SODIUM LACTATE, POTASSIUM CHLORIDE, AND CALCIUM CHLORIDE 125 ML/HR: 600; 310; 30; 20 INJECTION, SOLUTION INTRAVENOUS at 19:40

## 2018-05-04 RX ADMIN — ASPIRIN 325 MG: 325 TABLET ORAL at 09:25

## 2018-05-04 NOTE — PROGRESS NOTES
Problem: Self Care Deficits Care Plan (Adult)  Goal: *Acute Goals and Plan of Care (Insert Text)  Occupational Therapy Goals:  Goals reviewed and continued: 5/4/18  Revised 4/27/18  1. Patient will complete bathing upper body to knees with setup within 7 days upgraded 4/27  2. Patient will complete bariatric BSC transfer with supervision bariatric RW within 7 days. Upgraded 4/27  3. Patient will complete lower body dressing activities with supervision within 7 days using AE  4. Patient will perform standing for clothing management tasks (toileting or dressing) supervision bariatric RW within 7 days. Upgraded 4/27  5. Patient will complete light resistance band UE exercise program independently within 7 days. Upgraded 4/27/18    Reevaluated 4/20/2018  1. Patient will complete grooming activity sitting EOB without support for 10 minutes within 7 days  2. Patient will complete BSC transfer with min A x 1 persons within 7 days. 3. Patient will complete lower body dressing activities with supervision within 7 days using AE  4. Patient will perform standing for clothing management tasks (toileting or dressing) with min A x 1 persons within 7 days. 5. Patient will complete UE exercise program independently within 7 days. Reevaluated 4/10/2018  1. Patient will complete grooming activity sitting EOB without support for 10 minutes within 7 days. 2.  Patient will complete BSC transfer with min A x 2 persons within 7 days. 3.  Patient will complete lower body dressing activities with min A within 7 days. 4.  Patient will perform standing for clothing management tasks (toileting or dressing) with min A x 2 persons within 7 days. 5.  Patient will complete UE exercise program independently within 7 days. Initiated 4/4/2018  1. Patient will perform rolling and pulling self up to head of bed with min assist to assist with ADL's at bed level within 7 day(s).   2.  Patient will perform static sitting edge of bed > or = 10 minutes with supervision/set-up within 7 day(s). 3.  Patient will perform static standing with bilateral UE support on rolling walker > or = 3 minutes with minimal assistance/contact guard assist x's 2 within 7 day(s). 4.  Patient will perform toilet transfers with moderate assistance x's 2 to bedside commode within 7 days. 5.  Patient will perform bilateral UE AROM and strengthening exercises throughout day and grade exercises prn to increase demand within 7 day(s). Occupational Therapy TREATMENT: WEEKLY REASSESSMENT  Patient: Lorna Hernandez (38 y.o. female)  Date: 5/4/2018  Diagnosis: FISTULA  Small bowel fistula  INTEROCUTANOUS FISTULA <principal problem not specified>  Procedure(s) (LRB):  OPEN JEJUNOSTOMY TUBE PLACEMENT UNDER FLURO/ WOUND VAC CHANGE. (N/A) 22 Days Post-Op  Precautions: Fall, Skin, Contact  Chart, occupational therapy assessment, plan of care, and goals were reviewed. ASSESSMENT:  Patient seen in co treat with PT. Noted prior to start of mobility that abdominal drainage bag full. Patient stated that she was able to empty it herself. She completed emptying, cleaning, and reclosing bag with SBA to hand her supplies and remove full container. Min spillage of drainage at perineal which patient needed min assist to clean up in long sit. Patient replaced sylvia pad with Mod I. She mobilized to EOB with supervision. Reports back pain overall. Instructed in scapular pinches and active shoulder flexion to mobilize, decrease soft tissue shortening and stiffness. She completed with min cues for sets of 5 reps. Patient ambulated to door and back with min assist and second person to manage IV pump with wound vac. Patient returned to bed with min assist for left LE back into bed. She reports that stretcher chair hurts her bottom. Requested to use chair position in bed. Set up in chair position.  Instructed to sit on EOB or in chair position in bed for all meals, participation in self care with assist, and performing theraband and active exercises twice a day. Patient tearful at \"not doing well\". Reassured her of her progress and \"pushing through\" discomfort. Patient progressing toward goals and present goals continued at this time. She is able to tolerate 3 hours of therapy a day. Per chart, at baseline, she lives with mother, uses toilet tongs for sylvia care, got assist of mother for some lower body ADLs, and walked without AD prior to 2/2018. Progression toward goals:  [x]            Improving appropriately and progressing toward goals  []            Improving slowly and progressing toward goals  []            Not making progress toward goals and plan of care will be adjusted     PLAN:  Goals have been updated based on progression since last assessment. Patient continues to benefit from skilled intervention to address the above impairments. Continue to follow patient 5 times a week to address goals. Planned Interventions:  [x]                    Self Care Training                  [x]             Therapeutic Activities  [x]                    Functional Mobility Training    []             Cognitive Retraining  [x]                    Therapeutic Exercises           [x]             Endurance Activities  [x]                    Balance Training                   [x]             Neuromuscular Re-Education  []                    Visual/Perceptual Training     [x]        Home Safety Training  [x]                    Patient Education                 []             Family Training/Education  []                    Other (comment):  Discharge Recommendations: Inpatient Rehab  Further Equipment Recommendations for Discharge: none     SUBJECTIVE:   Patient stated I just don't feel good. I don't know why.     OBJECTIVE DATA SUMMARY:   Cognitive/Behavioral Status:  Neurologic State: Alert  Orientation Level: Oriented X4  Cognition: Appropriate for age attention/concentration; Appropriate safety awareness; Follows commands; Appropriate decision making  Perception: Appears intact  Perseveration: No perseveration noted  Safety/Judgement: Awareness of environment    Functional Mobility and Transfers for ADLs:  Bed Mobility:  Supine to Sit: Supervision;Assist x1  Sit to Supine: Minimum assistance;Assist x1    Transfers:  Sit to Stand: Minimum assistance;Assist x1           Balance:  Sitting: Intact  Sitting - Static: Good (unsupported)  Sitting - Dynamic: Good (unsupported)  Standing: Impaired  Standing - Static: Fair  Standing - Dynamic : Fair    ADL Intervention:       Grooming  Washing Hands: Modified independent         Lower Body Bathing  Bathing Assistance: Maximum assistance (inferred from mobility)  Perineal  : Moderate assistance  Position Performed:  (in long sit and standing)  Lower Body : Maximum assistance  Position Performed: Long sitting on bed         Lower Body Dressing Assistance  Pants With Elastic Waist: Maximum assistance (inferred from mobility)  Socks: Total assistance (dependent)  Slip on Shoes Without Back: Modified independent  Leg Crossed Method Used: No  Position Performed: Long sitting on bed;Standing;Seated edge of bed         Cognitive Retraining  Safety/Judgement: Awareness of environment    Activity Tolerance:   Fair  Please refer to the flowsheet for vital signs taken during this treatment.   After treatment:   [] Patient left in no apparent distress sitting up in chair  [x] Patient left in no apparent distress in bed in modified chair position  [x] Call bell left within reach  [x] Nursing notified  [] Caregiver present  [] Bed alarm activated    COMMUNICATION/COLLABORATION:   The patients plan of care was discussed with: Physical Therapist, Occupational Therapist and Registered Nurse    GERMAN Maher  Time Calculation: 36 mins

## 2018-05-04 NOTE — PROGRESS NOTES
As previously noted, [facility] cannot request a new authorization until the patient is officially deemed medically stable. Razia Finley explained that she will follow the patient through the weekend and will request authorization as of next week if she is stable then. Care Management will continue to follow her disposition.    Chandin Spivey, ROBERT

## 2018-05-04 NOTE — WOUND CARE
WOCN Note:     Follow-up visit for [facility] dressing change and fistula pouching. Chart shows:  Admitted for fistula takdown 3/7/18 by Dr. Susana Palacio with [facility] placement in [address] [place] [address]; history of multiple abdominal surgeries and Crohns disease. Admitted from home. Mother able to provide assistance in pouching prior to admission and became very competent in doing so.      Seen today with Carly Villalpando NP.  Alvino Carvajal  Assessment:   Patient is A&O x 4, continent and mobile - moves around room with assistance & has been working with PT.     Bed: total care bariatric      1. Midline abdominal surgical wound = 12 x 11.5 x 1.8 cm  70% pink/red granular wound base and 30% stomatized mucus membrane centrally with  peristalsis and output @ 4 o'clock. 1 piece of black sponge with central hole to allow for pouching of the fistulas; 3 Hawk's rings placed around hole top and bottom to achieve seal.  After seal achieved, a 2-piece pouch was placed to collect fistula output with 2 additional rings. 50 mmHg continuous suction.       Wound Recommendations:    Maintain VAC as ordered @ 50 mmHG continuous suction. Discussed above plan with patient & RN.     Transition of Care: Plan to follow as needed while admitted to hospital.      Demar Page, YOLANDAN, RN, [facility]  Certified Wound, Ostomy, Continence Nurse  office [phone]  pager [phone] or call  to page

## 2018-05-04 NOTE — PROGRESS NOTES
General Surgery Daily Progress Note    Admit Date: 3/7/2018  Post-Operative Day: 22 Days Post-Op from Procedure(s):  OPEN JEJUNOSTOMY TUBE PLACEMENT UNDER FLURO/ WOUND VAC CHANGE. Subjective:     Last 24 hrs: No new concerns this AM.  Did not received pain meds overnight bc her BP was too low, she believes. Currently rates pain as 7/10 in severity. Has difficulty tolerateing the Vital 1.5 drinks--causes nausea and fullness. Says she likes the other protein drinks more. Hopefully to get vac changed later today, so that it will stay intact over weekend. Denies fevers. Voiding. Objective:     Blood pressure 101/60, pulse (!) 107, temperature 98.5 °F (36.9 °C), resp. rate 16, height 5' 4\" (1.626 m), weight 283 lb 1.6 oz (128.4 kg), SpO2 95 %. Temp (24hrs), Av.5 °F (36.9 °C), Min:98.2 °F (36.8 °C), Max:98.9 °F (37.2 °C)      _____________________  Physical Exam:     Alert and Oriented, in no acute distress. 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 Replete K  AM labs  Encourage OOB  Wound care/vac change later today. Analgesics as needed  Will return later to witness wound care with [facility]. Further treatment per Dr. Campbell Jeffers. Data Review:    Recent Labs      18   0533  18   0127  18   0347   WBC  11.8*  14.7*  13.1*   HGB  8.9*  9.9*  9.9*   HCT  28.2*  31.4*  31.9*   PLT  372  428*  431*     Recent Labs      18   0533  18   0127  18   0347   NA  129*  133*  132*   K  3.1*  3.0*  2.9*   CL  85*  88*  88*   CO2  35*  35*  35*   GLU  112*  92  138*   BUN  10  11  11   CREA  1.65*  1.53*  1.59*   CA  8.3*  8.8  9.3     No results for input(s): AML, LPSE in the last 72 hours.         ______________________  Medications:    Current Facility-Administered Medications Medication Dose Route Frequency    guaiFENesin (ROBITUSSIN) 100 mg/5 mL oral liquid 100 mg  100 mg Oral Q4H PRN    lactated Ringers infusion  125 mL/hr IntraVENous CONTINUOUS    morphine IR (MS IR) tablet 45 mg  45 mg Oral Q4H    morphine IR (MS IR) tablet 30 mg  30 mg Oral DAILY PRN    lidocaine (LIDODERM) 5 % patch 2 Patch  2 Patch TransDERmal Q24H    0.9% sodium chloride infusion 250 mL  250 mL IntraVENous PRN    morphine CR (MS CONTIN) tablet 115 mg  115 mg Oral QHS    morphine CR (MS CONTIN) tablet 100 mg  100 mg Oral Q24H    morphine CR (MS CONTIN) tablet 100 mg  100 mg Oral Q24H    aspirin (ASPIRIN) tablet 325 mg  325 mg Oral DAILY    0.9% sodium chloride infusion 250 mL  250 mL IntraVENous PRN    acetaminophen (TYLENOL) tablet 650 mg  650 mg Oral Q6H PRN    diphenhydrAMINE (BENADRYL) capsule 25 mg  25 mg Oral Q6H PRN    enoxaparin (LOVENOX) injection 40 mg  40 mg SubCUTAneous Q24H    nystatin (MYCOSTATIN) 100,000 unit/gram cream   Topical BID    prochlorperazine (COMPAZINE) with saline injection 5 mg  5 mg IntraVENous Q6H PRN    naloxone (NARCAN) injection 0.4 mg  0.4 mg IntraVENous EVERY 2 MINUTES AS NEEDED    glucose chewable tablet 16 g  4 Tab Oral PRN    dextrose (D50W) injection syrg 12.5-25 g  12.5-25 g IntraVENous PRN    glucagon (GLUCAGEN) injection 1 mg  1 mg IntraMUSCular PRN    scopolamine (TRANSDERM-SCOP) 1 mg over 3 days 1 Patch  1 Patch TransDERmal Q72H    sodium chloride (NS) flush 10 mL  10 mL InterCATHeter Q24H    sodium chloride (NS) flush 10 mL  10 mL InterCATHeter Q8H    alteplase (CATHFLO) 1 mg in sterile water (preservative free) 1 mL injection  1 mg InterCATHeter PRN    bacitracin 500 unit/gram packet 1 Packet  1 Packet Topical PRN    sodium chloride (NS) flush 20 mL  20 mL InterCATHeter PRN    sodium chloride (NS) flush 10 mL  10 mL InterCATHeter Q24H    sodium chloride (NS) flush 10 mL  10 mL InterCATHeter PRN    sodium chloride (NS) flush 10 mL  10 mL InterCATHeter Q8H    dronabinol (MARINOL) capsule 2.5 mg  2.5 mg Oral BID    phenol throat spray (CHLORASEPTIC) 1 Spray  1 Spray Oral PRN    ondansetron (ZOFRAN) injection 4 mg  4 mg IntraVENous Q4H PRN    promethazine (PHENERGAN) 12.5 mg in 0.9% sodium chloride 50 mL IVPB  12.5 mg IntraVENous Q6H PRN    LORazepam (ATIVAN) injection 1 mg  1 mg IntraVENous Q6H PRN    albuterol (PROVENTIL VENTOLIN) nebulizer solution 2.5 mg  2.5 mg Nebulization Q4H PRN       ADDENDUM 15:07  Present for wound care & vac replacement. Dai abdominal wound measuring [id number]. Central stoma approx 5cm round--Very active output from stoma. Skin margins healthy & pink.       [place]iana Ly PA-C  5/4/2018

## 2018-05-04 NOTE — PROGRESS NOTES
05/04/18 0546   Vital Signs   Temp 98.5 °F (36.9 °C)   Temp Source Oral   Pulse (Heart Rate) (!) 118   Heart Rate Source Monitor   Resp Rate 16   O2 Sat (%) 93 %   Level of Consciousness Alert   BP 97/50   MAP (Calculated) 66   BP 1 Method Automatic   MEWS Score 4     Spoke to Dr. Edin Houston to notify,, mentioned ok to give scheduled IR Morphine 45 mg dose now, will notify Dr. Daljit Dueñas to make aware and stated since she is aware and alert to give scheduled medication. MD aware of current order to hold if systolic is <394.

## 2018-05-04 NOTE — PROGRESS NOTES
Bedside shift change report given to Mohsen Linares RN (oncoming nurse) by Deepali Lee RN (offgoing nurse). Report included the following information SBAR, Intake/Output and MAR.

## 2018-05-04 NOTE — PROGRESS NOTES
05/04/18 0705   Vital Signs   Temp 98.4 °F (36.9 °C)   Temp Source Oral   Pulse (Heart Rate) (!) 107   Resp Rate 16   O2 Sat (%) 95 %   Level of Consciousness Alert   BP 94/52   MAP (Calculated) 66   MEWS Score 3   MD aware

## 2018-05-04 NOTE — PROGRESS NOTES
Palliative Medicine Consult  [facility]: [phone] ([phone])    Patient Name: Lorna Hernandez  YOB: 1975    Date of Initial Consult: 3/12/18  Reason for Consult: Pain management, chronic and post op   Requesting Provider: Rajni   Primary Care Physician: Rama Vang MD     SUMMARY:   Lorna Hernandez is a 38 y.o. with a past history of Crohn's disease (followed by Dr Martin Urias), mult surgeries s/p total colectomy w/ end ileostomy, 6/2017 ex lap with small bowel perforation, 6/28/17 ex lap, LPA, repair or enterotomy, SMA stent on L, 7/16/2917 ex lap, KATHRINE, fistula exclusion w/ feeding tube placement and prolonged TPN who was admitted on 3/7/2018 from home for planned surgery, s/p ex lap w/ extensive KATHRINE and small bowel fistula take down, wound vac placement. 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 Had red rubber tube placed into fistula on 4/4 , then open j tube placement 4/17, tolerating slow TFs. Known to our team during past hospitalizations. Has had lengthy hospital stays w/ mult complications. Has required [facility] stays in past. Has chronic pain from Crohn's disease (and has not been able to tolerate home Crohn's meds recently due to acute issues- had been on steroids, stopped prior to surgery). Discussion of Remicaide in future. Current medical issues leading to Palliative Medicine involvement include: pain management. Patient's mother, Naina Tanner is NOK.  reviewed, no abberrancies- one prescriber for opioids and one pharmacy. Most recently filled 3/9/18: MSContin 100mg tid and Morphine IR 30mg- 8 tabs a day. Has been tried on equivalent dose of Fentanyl patch w/out same benefit. Discussed Methadone. Have communicated w/ the provider (Dr Stephanie Dhaliwal) who follows her for pain issues. PALLIATIVE DIAGNOSES:     1. Chronic abdominal pain from Cronh's disease and hx of surgeries  2. Lumbar back pain, chronic   3. Nausea  4.  Anxiety 5. Edema , improved  6. Grief/depressive sx   7. Opioid tolerance      PLAN:     Abd pain from surgical incision and wound care as well as underlying chronic pain from Crohn's which is worse when off her steroids. Has high opioid tolerance, no aberrancies on . We have done opioid rotation in the hospital for her in past. Pt also w/ chronic lower back pain that she is feeling now that she is more active. Have discussed w/ provider who follows for her abdominal pain, Dr Racheal Espinoza this stay. Mom Anais Berry states that she has one month supply of medications at home. Pain :    1. Pt's mother asked me to clarify bp parameters for pain meds; my opinion is that as long as SBP >= 90 and pt easy to arouse, pt ok to receive any / all opioids including scheduled / PRN; of course, defer to primary service opinion on this; discussed with Rickie Taylor from primary service    2. Plan continues to be for rehab at [address] [place] [address]. Pt has been off IV pain meds for several days now. 3. Continue MSContin- 100mg bid and 115mg bedtime. 4. Continue Morphine IR 45mg every 4h scheduled. Hold for sedation, confusion, SBP <90, RR <10. Pt may refuse. 5. For wound care, pt may receive an extra Morphine IR 30mg prn. To be given approximately 30 min before wound vac change. 6. For lower back pain, cont Lidoderm patches b/l.     7. Continuing to support. Communicated plan of care with: Palliative IDT;  Sarah DOTSON     GOALS OF CARE / TREATMENT PREFERENCES:     GOALS OF CARE:  Patient/Health Care Proxy Stated Goals:  (get pain better, treatment of acute issues.)      TREATMENT PREFERENCES:   Code Status: Full Code    Advance Care Planning:  Advance Care Planning 3/15/2018   Patient's Healthcare Decision Maker is: Named in scanned ACP document   Primary Decision Maker Name Kurt Giraldo   Primary Decision Maker Phone Number [phone]   Primary Decision Maker Relationship to Patient Parent   Secondary Decision Maker Name Rufina Angelo   Secondary Decision Maker Phone Number [phone]   Secondary Decision Maker Relationship to Patient Unknown   Confirm Advance Directive Yes, on file   Patient Would Like to Complete Advance Directive -   Does the patient have other document types -       Medical Interventions: Full interventions           Other:    As far as possible, the palliative care team has discussed with patient / health care proxy about goals of care / treatment preferences for patient. HISTORY:     Reviewed vitals, I/O's, labs, imaging, MAR, notes. HPI/SUBJECTIVE:    The patient is:   [x] Verbal and participatory  [] Non-participatory due to:     Pt doing well over all. Getting wound care now. Pain about 7. No nausea / sob. Denies untoward effects from pain meds -- no sedation / AMS / confusion.     Clinical Pain Assessment (nonverbal scale for severity on nonverbal patients):   Clinical Pain Assessment  Severity: 7  Location: Generalized abdomen/ axial lower back w/out radiation into legs  Character: Sharp/ aching   Duration: Pain improved since surgery/ chronic   Effect: Makes ambulation more difficult   Factors: Better w/ medication/ better w/ standing up straight  Frequency: Constant          Duration: for how long has pt been experiencing pain (e.g., 2 days, 1 month, years)  Frequency: how often pain is an issue (e.g., several times per day, once every few days, constant)     FUNCTIONAL ASSESSMENT:     Palliative Performance Scale (PPS):  PPS: 70       PSYCHOSOCIAL/SPIRITUAL SCREENING:     Palliative IDT has assessed this patient for cultural preferences / practices and a referral made as appropriate to needs (Cultural Services, Patient Advocacy, Ethics, etc.)    Advance Care Planning:  Advance Care Planning 3/15/2018   Patient's Healthcare Decision Maker is: Named in scanned ACP document   Primary Decision Maker Name César Don   Primary Decision Maker Phone Number [phone]   Primary Decision Maker Relationship to Patient Parent   Secondary Decision Maker Name Russell Kathleen   Secondary Decision Maker Phone Number [phone]   Secondary Decision Maker Relationship to Patient Unknown   Confirm Advance Directive Yes, on file   Patient Would Like to Complete Advance Directive -   Does the patient have other document types -       Any spiritual / Synagogue concerns:  [] Yes /  [x] No    Caregiver Burnout:  [] Yes /  [x] No /  [] No Caregiver Present      Anticipatory grief assessment:   [x] Normal  / [] Maladaptive       ESAS Anxiety: Anxiety: 0    ESAS Depression: Depression: 0        REVIEW OF SYSTEMS:     Positive and pertinent negative findings in ROS are noted above in HPI. The following systems were [x] reviewed / [] unable to be reviewed as noted in HPI  Other findings are noted below. Systems: constitutional, ears/nose/mouth/throat, respiratory, gastrointestinal, genitourinary, musculoskeletal, integumentary, neurologic, psychiatric, endocrine. Positive findings noted below. Modified ESAS Completed by: provider   Fatigue: 2 Drowsiness: 0   Depression: 0 Pain: 7   Anxiety: 0 Nausea: 0   Anorexia: 0 Dyspnea: 0     Constipation: No              PHYSICAL EXAM:     From RN flowsheet:  Wt Readings from Last 3 Encounters:   05/04/18 128.4 kg (283 lb 1.6 oz)   03/05/18 138 kg (304 lb 3.2 oz)   02/28/18 138.8 kg (306 lb)     Blood pressure 102/49, pulse (!) 118, temperature 98.4 °F (36.9 °C), resp. rate 16, height 5' 4\" (1.626 m), weight 128.4 kg (283 lb 1.6 oz), SpO2 95 %.     Pain Scale 1: Numeric (0 - 10)  Pain Intensity 1: 6  Pain Onset 1: dressing change  Pain Location 1: Abdomen  Pain Orientation 1: Anterior  Pain Description 1: Sharp  Pain Intervention(s) 1: Repositioned  Last bowel movement, if known: stool output in ostomy     Constitutional: awake, alert, oriented, no sedation or confusion   Eyes: pupils equal, anicteric  ENMT: moist mucous membranes, no nasal discharge  Respiratory: breathing not labored, symmetric  Cardiovascular: rr, no murmur  Gastrointestinal: soft, + bowel sounds, wound care in progress --  Wound is smaller overall in appearance  Musculoskeletal: no deformity, can raise b/l LEs in bed  Skin: warm, dry, pale  Ext: B/l pitting LE edema improved, pulses intact  Neurologic: following commands, moving all extremities  Psychiatric: full affect, no hallucinations         HISTORY:     Active Problems:    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)      Past Medical History:   Diagnosis Date    Adverse effect of anesthesia     WOKE DURING SURGERY    Arthritis     Blood clot in vein 2017    STOMACH    [facility]'s disease [facility] [address] 8/15/2011    [facility] 8/15/2011    LEFT LEG    Edema     GENERALIZED R/T TPN; WAIST DOWN    Incarcerated ventral hernia 8/15/2011    Lupus     Lyme disease     Psychiatric disorder     ANXIETY    Right flank pain 8/22/2011    Seizures ([facility] [address] 1990    LAST AT AGE 17    Stroke [facility]) 1990    age 17; A WEEK POST OP, HAD SEIZURES AND STROKE, WAS IN COMA FOR A MONTH    Thyroid disease     HYPO-NO MEDS      Past Surgical History:   Procedure Laterality Date    HX APPENDECTOMY      HX GYN  2015    HIDRADENTIS LABIA    HX OTHER SURGICAL      multiple procedures related to her Crohn's disease    HX OTHER SURGICAL      ABDOMINAL SURGERY REMOVING COLON, 2/3 STOMACH, 1/3 SMALL INTESTINE    CA LAP, INCISIONAL HERNIA REPAIR,INCARCERATED  9-10-08    dr. Dilcia Paz      Family History   Problem Relation Age of Onset    Hypertension Father     Stroke Father     Other Father      arthritis    Arthritis-osteo Father     Hypertension Mother     Arthritis-osteo Mother     Anesth Problems Neg Hx       History reviewed, no pertinent family history.   Social History   Substance Use Topics    Smoking status: Former Smoker     Packs/day: 1.00     Years: 16.00     Quit date: 2/27/2017    Smokeless tobacco: Former User      Comment: OFF AND ON    Alcohol use No Allergies   Allergen Reactions    Sulfa (Sulfonamide Antibiotics) Anaphylaxis    Demerol [Meperidine] Rash    Soma [Carisoprodol] Rash and Nausea Only    Toradol [Ketorolac Tromethamine] Nausea and Vomiting      Current Facility-Administered Medications   Medication Dose Route Frequency    guaiFENesin (ROBITUSSIN) 100 mg/5 mL oral liquid 100 mg  100 mg Oral Q4H PRN    lactated Ringers infusion  125 mL/hr IntraVENous CONTINUOUS    morphine IR (MS IR) tablet 45 mg  45 mg Oral Q4H    morphine IR (MS IR) tablet 30 mg  30 mg Oral DAILY PRN    lidocaine (LIDODERM) 5 % patch 2 Patch  2 Patch TransDERmal Q24H    0.9% sodium chloride infusion 250 mL  250 mL IntraVENous PRN    morphine CR (MS CONTIN) tablet 115 mg  115 mg Oral QHS    morphine CR (MS CONTIN) tablet 100 mg  100 mg Oral Q24H    morphine CR (MS CONTIN) tablet 100 mg  100 mg Oral Q24H    aspirin (ASPIRIN) tablet 325 mg  325 mg Oral DAILY    0.9% sodium chloride infusion 250 mL  250 mL IntraVENous PRN    acetaminophen (TYLENOL) tablet 650 mg  650 mg Oral Q6H PRN    diphenhydrAMINE (BENADRYL) capsule 25 mg  25 mg Oral Q6H PRN    enoxaparin (LOVENOX) injection 40 mg  40 mg SubCUTAneous Q24H    nystatin (MYCOSTATIN) 100,000 unit/gram cream   Topical BID    prochlorperazine (COMPAZINE) with saline injection 5 mg  5 mg IntraVENous Q6H PRN    naloxone (NARCAN) injection 0.4 mg  0.4 mg IntraVENous EVERY 2 MINUTES AS NEEDED    glucose chewable tablet 16 g  4 Tab Oral PRN    dextrose (D50W) injection syrg 12.5-25 g  12.5-25 g IntraVENous PRN    glucagon (GLUCAGEN) injection 1 mg  1 mg IntraMUSCular PRN    scopolamine (TRANSDERM-SCOP) 1 mg over 3 days 1 Patch  1 Patch TransDERmal Q72H    sodium chloride (NS) flush 10 mL  10 mL InterCATHeter Q24H    sodium chloride (NS) flush 10 mL  10 mL InterCATHeter Q8H    alteplase (CATHFLO) 1 mg in sterile water (preservative free) 1 mL injection  1 mg InterCATHeter PRN    bacitracin 500 unit/gram packet 1 Packet  1 Packet Topical PRN    sodium chloride (NS) flush 20 mL  20 mL InterCATHeter PRN    sodium chloride (NS) flush 10 mL  10 mL InterCATHeter Q24H    sodium chloride (NS) flush 10 mL  10 mL InterCATHeter PRN    sodium chloride (NS) flush 10 mL  10 mL InterCATHeter Q8H    dronabinol (MARINOL) capsule 2.5 mg  2.5 mg Oral BID    phenol throat spray (CHLORASEPTIC) 1 Spray  1 Spray Oral PRN    ondansetron (ZOFRAN) injection 4 mg  4 mg IntraVENous Q4H PRN    promethazine (PHENERGAN) 12.5 mg in 0.9% sodium chloride 50 mL IVPB  12.5 mg IntraVENous Q6H PRN    LORazepam (ATIVAN) injection 1 mg  1 mg IntraVENous Q6H PRN    albuterol (PROVENTIL VENTOLIN) nebulizer solution 2.5 mg  2.5 mg Nebulization Q4H PRN          LAB AND IMAGING FINDINGS:     Lab Results   Component Value Date/Time    WBC 11.8 (H) 05/04/2018 05:33 AM    HGB 8.9 (L) 05/04/2018 05:33 AM    PLATELET 626 11/09/3247 05:33 AM     Lab Results   Component Value Date/Time    Sodium 129 (L) 05/04/2018 05:33 AM    Potassium 3.1 (L) 05/04/2018 05:33 AM    Chloride 85 (L) 05/04/2018 05:33 AM    CO2 35 (H) 05/04/2018 05:33 AM    BUN 10 05/04/2018 05:33 AM    Creatinine 1.65 (H) 05/04/2018 05:33 AM    Calcium 8.3 (L) 05/04/2018 05:33 AM    Magnesium 1.7 04/29/2018 04:33 AM    Phosphorus 4.3 04/29/2018 04:33 AM      Lab Results   Component Value Date/Time    AST (SGOT) 11 (L) 05/01/2018 01:20 AM    Alk.  phosphatase 102 05/01/2018 01:20 AM    Protein, total 7.6 05/01/2018 01:20 AM    Albumin 2.1 (L) 05/01/2018 01:20 AM    Globulin 5.5 (H) 05/01/2018 01:20 AM     Lab Results   Component Value Date/Time    INR 1.0 12/07/2017 08:37 AM    Prothrombin time 10.0 12/07/2017 08:37 AM      No results found for: IRON, FE, TIBC, IBCT, PSAT, FERR   No results found for: PH, PCO2, PO2  No components found for: GLPOC   No results found for: CPK, CKMB             Total time:   Counseling / coordination time, spent as noted above:   > 50% counseling / coordination?: Prolonged service was provided for  []30 min   []75 min in face to face time in the presence of the patient, spent as noted above. Time Start:   Time End:   Note: this can only be billed with 92197 (initial) or 28452 (follow up). If multiple start / stop times, list each separately.

## 2018-05-04 NOTE — PROGRESS NOTES
Bedside shift change report given to [facility] CHARBEL (oncoming nurse) by Basia Weeks (offgoing nurse). Report included the following information SBAR.     0700- Wound vac drained 150ml. Fistula emptied 2100ml.

## 2018-05-04 NOTE — PROGRESS NOTES
Bedside shift change report given to Johanna Mancuso (oncoming nurse) by Paris Fraser (offgoing nurse). Report included the following information SBAR, Kardex, Intake/Output, MAR and Recent Results.

## 2018-05-04 NOTE — PROGRESS NOTES
1730:  Patient states that she discussed holding parameters with palliative care MD and reducing them to hold if SBP <90 as her SBP's have been running in the 90's. Patient concerned if her pain meds keep getting held, she won't be able to get her pain under control. Per Dr. Tian Sandoval' note, to be deferred to Gen Surg. MD on call paged. Bedside shift change report given to Ellen Wilson RN (oncoming nurse) by Elly Astorga RN (offgoing nurse). Report included the following information SBAR, Intake/Output and MAR.

## 2018-05-04 NOTE — PROGRESS NOTES
Problem: Mobility Impaired (Adult and Pediatric)  Goal: *Acute Goals and Plan of Care (Insert Text)  Physical Therapy Goals  Goals reassessed 5/4/18, updated and remain appropriate over next 7 days  Continue progress towards current goals 4/26/18  Update 4/18/2018  1. Patient will move from supine to sit and sit to supine  in bed with minimal assistance  within 7 day(s). MET on 5/4/18-Updated goal:  Patient will move from supine to sit and sit to supine  in bed with supervision within 7 day(s). 2.  Patient will transfer from bed to chair and chair to bed with minimal assistance  using the least restrictive device within 7 day(s). MET on 5/4/18-Updated goal:  Patient will transfer from bed to chair and chair to bed with supervision using the least restrictive device within 7 day(s). 3.  Patient will perform sit to stand with SBA within 7 days  4. Patient will ambulate 150' with SBA x 1 using LRAD within 7 days. Update 4/6/2018  1. Patient will move from supine to sit and sit to supine  in bed with moderate assistance  within 7 day(s). 2.  Patient will transfer from bed to chair and chair to bed with moderate assistance  using the least restrictive device within 7 day(s). 3.  Patient will perform sit to stand with CGA within 7 days  4. Patient will ambulate 22' with moderate assist x 1 using LRAD within 7 days. Revised 3/27/2018  1. Patient will move from supine to sit and sit to supine  in bed with moderate assistance  within 7 day(s). 2.  Patient will transfer from bed to chair and chair to bed with moderate assistance  using the least restrictive device within 7 day(s). 3.  Patient will perform sit to stand with moderate assistance  within 7 day(s). 4.  Patient will ambulate 100' with moderate assist x1 using the least restrictive device within 7 day(s). Revisited 3/19/2018, goals remain appropriate, carry over    Physical Therapy Goals  Initiated 3/8/2018  1.   Patient will move from supine to sit and sit to supine  in bed with moderate assistance  within 7 day(s). 2.  Patient will transfer from bed to chair and chair to bed with moderate assistance  using the least restrictive device within 7 day(s). 3.  Patient will perform sit to stand with moderate assistance  within 7 day(s). 4.  PT will assess gait with the least restrictive device within 7 day(s). physical Therapy TREATMENT: WEEKLY REASSESSMENT  Patient: Faye Calvillo (38 y.o. female)  Date: 5/4/2018  Diagnosis: FISTULA  Small bowel fistula  INTEROCUTANOUS FISTULA <principal problem not specified>  Procedure(s) (LRB):  OPEN JEJUNOSTOMY TUBE PLACEMENT UNDER FLURO/ WOUND VAC CHANGE. (N/A) 22 Days Post-Op  Precautions: Fall, Skin, Contact  Chart, physical therapy assessment, plan of care and goals were reviewed. ASSESSMENT:  Patient has met 2 goals to date, these goals were updated and all others remain appropriate. Patient tearful during session, secondary to pain and not feeling well-feeling this limited her progress. Reassured patient she is progressing well and encouraged her to focus on what she has been able to accomplish. Patient with c/o back pain, completed scapular retraction exercises seated and standing. Also performed alternating shoulder flexion sitting EOB to assist in stiffness. Patient able to ambulate 40 feet with HHA and min A for balance. She continues to require min A for sit to supine, primarily for L LE management. Patient reports stretcher chair is uncomfortable but agreeable for bed to be placed in chair position to allow for upright positioning. Patient remains very motivated to progress and participate in IP rehab at [facility]. Patient's progression toward goals since last assessment: 2 goals met     PLAN:  Goals have been updated based on progression since last assessment. Patient continues to benefit from skilled intervention to address the above impairments.   Continue to follow the patient 5 times a week to address goals. Planned Interventions:  [x]              Bed Mobility Training             [x]       Neuromuscular Re-Education  [x]              Transfer Training                   []       Orthotic/Prosthetic Training  [x]              Gait Training                         []       Modalities  [x]              Therapeutic Exercises           []       Edema Management/Control  [x]              Therapeutic Activities            [x]       Patient and Family Training/Education  []              Other (comment):  Discharge Recommendations: Inpatient Rehab  Further Equipment Recommendations for Discharge: TBD     SUBJECTIVE:   Patient stated [facility] I don't want to do anything to mess that up, I'm really trying.     OBJECTIVE DATA SUMMARY:   Critical Behavior:  Neurologic State: Alert  Orientation Level: Oriented X4  Cognition: Follows commands  Safety/Judgement: Good awareness of safety precautions, Insight into deficits, Awareness of environment    Strength:   Strength: Generally decreased, functional                      Functional Mobility Training:  Bed Mobility:     Supine to Sit: Supervision  Sit to Supine: Minimum assistance           Transfers:  Sit to Stand: Minimum assistance;Contact guard assistance  Stand to Sit: Contact guard assistance  Stand Pivot Transfers: Minimum assistance                          Balance:  Sitting: Intact  Standing: Impaired  Standing - Static: Good;Constant support  Standing - Dynamic : Good  Ambulation/Gait Training:  Distance (ft): 40 Feet (ft)  Assistive Device: Gait belt (HHA)  Ambulation - Level of Assistance: Minimal assistance        Gait Abnormalities: Antalgic;Decreased step clearance        Base of Support: Widened     Speed/Liz: Pace decreased (<100 feet/min)  Step Length: Right shortened;Left shortened                    Stairs:           Neuro Re-Education:    Therapeutic Exercises:   Scapular retraction: Sitting and standing  Shoulder flexion:  Sitting   Pain:                    Activity Tolerance:   Improving   Please refer to the flowsheet for vital signs taken during this treatment.   After treatment:   []  Patient left in no apparent distress sitting up in chair  [x]  Patient left in no apparent distress in bed-chair position   [x]  Call bell left within reach  [x]  Nursing notified  []  Caregiver present  []  Bed alarm activated    COMMUNICATION/COLLABORATION:   The patients plan of care was discussed with: Registered Nurse    Jesus Dutton, PT   Time Calculation: 35 mins

## 2018-05-05 LAB
ANION GAP SERPL CALC-SCNC: 9 MMOL/L (ref 5–15)
BUN SERPL-MCNC: 11 MG/DL (ref 6–20)
BUN/CREAT SERPL: 6 (ref 12–20)
CALCIUM SERPL-MCNC: 8.9 MG/DL (ref 8.5–10.1)
CHLORIDE SERPL-SCNC: 84 MMOL/L (ref 97–108)
CO2 SERPL-SCNC: 37 MMOL/L (ref 21–32)
CREAT SERPL-MCNC: 1.83 MG/DL (ref 0.55–1.02)
ERYTHROCYTE [DISTWIDTH] IN BLOOD BY AUTOMATED COUNT: 16.8 % (ref 11.5–14.5)
GLUCOSE SERPL-MCNC: 101 MG/DL (ref 65–100)
HCT VFR BLD AUTO: 28.5 % (ref 35–47)
HGB BLD-MCNC: 9.1 G/DL (ref 11.5–16)
MCH RBC QN AUTO: 29.4 PG (ref 26–34)
MCHC RBC AUTO-ENTMCNC: 31.9 G/DL (ref 30–36.5)
MCV RBC AUTO: 91.9 FL (ref 80–99)
NRBC # BLD: 0 K/UL (ref 0–0.01)
NRBC BLD-RTO: 0 PER 100 WBC
PLATELET # BLD AUTO: 401 K/UL (ref 150–400)
PMV BLD AUTO: 9.3 FL (ref 8.9–12.9)
POTASSIUM SERPL-SCNC: 3.2 MMOL/L (ref 3.5–5.1)
RBC # BLD AUTO: 3.1 M/UL (ref 3.8–5.2)
SODIUM SERPL-SCNC: 130 MMOL/L (ref 136–145)
WBC # BLD AUTO: 12 K/UL (ref 3.6–11)

## 2018-05-05 PROCEDURE — 85027 COMPLETE CBC AUTOMATED: CPT | Performed by: SURGERY

## 2018-05-05 PROCEDURE — 74011250637 HC RX REV CODE- 250/637: Performed by: PHYSICIAN ASSISTANT

## 2018-05-05 PROCEDURE — 74011250636 HC RX REV CODE- 250/636: Performed by: SURGERY

## 2018-05-05 PROCEDURE — 77030019952 HC CANSTR VAC ASST KCON -B

## 2018-05-05 PROCEDURE — 36415 COLL VENOUS BLD VENIPUNCTURE: CPT | Performed by: SURGERY

## 2018-05-05 PROCEDURE — 74011250637 HC RX REV CODE- 250/637: Performed by: INTERNAL MEDICINE

## 2018-05-05 PROCEDURE — 80048 BASIC METABOLIC PNL TOTAL CA: CPT | Performed by: SURGERY

## 2018-05-05 PROCEDURE — 74011250636 HC RX REV CODE- 250/636: Performed by: NURSE PRACTITIONER

## 2018-05-05 PROCEDURE — 74011250637 HC RX REV CODE- 250/637: Performed by: PHYSICAL MEDICINE & REHABILITATION

## 2018-05-05 PROCEDURE — 74011250637 HC RX REV CODE- 250/637: Performed by: NURSE PRACTITIONER

## 2018-05-05 PROCEDURE — 74011250637 HC RX REV CODE- 250/637: Performed by: SURGERY

## 2018-05-05 PROCEDURE — 65270000029 HC RM PRIVATE

## 2018-05-05 RX ORDER — POTASSIUM CHLORIDE 7.45 MG/ML
10 INJECTION INTRAVENOUS
Status: COMPLETED | OUTPATIENT
Start: 2018-05-05 | End: 2018-05-16

## 2018-05-05 RX ADMIN — ONDANSETRON HYDROCHLORIDE 4 MG: 2 INJECTION INTRAMUSCULAR; INTRAVENOUS at 10:05

## 2018-05-05 RX ADMIN — MORPHINE SULFATE 100 MG: 100 TABLET, FILM COATED, EXTENDED RELEASE ORAL at 07:08

## 2018-05-05 RX ADMIN — ASPIRIN 325 MG: 325 TABLET ORAL at 10:05

## 2018-05-05 RX ADMIN — POTASSIUM CHLORIDE 10 MEQ: 10 INJECTION, SOLUTION INTRAVENOUS at 10:06

## 2018-05-05 RX ADMIN — MORPHINE SULFATE 30 MG: 15 TABLET ORAL at 14:22

## 2018-05-05 RX ADMIN — POTASSIUM CHLORIDE 10 MEQ: 10 INJECTION, SOLUTION INTRAVENOUS at 12:13

## 2018-05-05 RX ADMIN — MORPHINE SULFATE 115 MG: 15 TABLET, EXTENDED RELEASE ORAL at 23:21

## 2018-05-05 RX ADMIN — ENOXAPARIN SODIUM 40 MG: 100 INJECTION SUBCUTANEOUS at 10:05

## 2018-05-05 RX ADMIN — MORPHINE SULFATE 30 MG: 15 TABLET ORAL at 21:17

## 2018-05-05 RX ADMIN — Medication 10 ML: at 23:21

## 2018-05-05 RX ADMIN — MORPHINE SULFATE 45 MG: 15 TABLET ORAL at 06:18

## 2018-05-05 RX ADMIN — ACETAMINOPHEN 650 MG: 325 TABLET, FILM COATED ORAL at 03:55

## 2018-05-05 RX ADMIN — SODIUM CHLORIDE, SODIUM LACTATE, POTASSIUM CHLORIDE, AND CALCIUM CHLORIDE 125 ML/HR: 600; 310; 30; 20 INJECTION, SOLUTION INTRAVENOUS at 03:57

## 2018-05-05 RX ADMIN — MORPHINE SULFATE 45 MG: 15 TABLET ORAL at 16:42

## 2018-05-05 RX ADMIN — ONDANSETRON HYDROCHLORIDE 4 MG: 2 INJECTION INTRAMUSCULAR; INTRAVENOUS at 00:53

## 2018-05-05 RX ADMIN — Medication 10 ML: at 12:22

## 2018-05-05 RX ADMIN — Medication 10 ML: at 14:00

## 2018-05-05 RX ADMIN — ONDANSETRON HYDROCHLORIDE 4 MG: 2 INJECTION INTRAMUSCULAR; INTRAVENOUS at 23:20

## 2018-05-05 RX ADMIN — MORPHINE SULFATE 45 MG: 15 TABLET ORAL at 12:13

## 2018-05-05 RX ADMIN — POTASSIUM CHLORIDE 10 MEQ: 10 INJECTION, SOLUTION INTRAVENOUS at 14:23

## 2018-05-05 RX ADMIN — SODIUM CHLORIDE, SODIUM LACTATE, POTASSIUM CHLORIDE, AND CALCIUM CHLORIDE 125 ML/HR: 600; 310; 30; 20 INJECTION, SOLUTION INTRAVENOUS at 14:22

## 2018-05-05 RX ADMIN — MORPHINE SULFATE 45 MG: 15 TABLET ORAL at 03:55

## 2018-05-05 RX ADMIN — ONDANSETRON HYDROCHLORIDE 4 MG: 2 INJECTION INTRAMUSCULAR; INTRAVENOUS at 14:22

## 2018-05-05 RX ADMIN — POTASSIUM CHLORIDE 10 MEQ: 10 INJECTION, SOLUTION INTRAVENOUS at 16:43

## 2018-05-05 RX ADMIN — DRONABINOL 2.5 MG: 2.5 CAPSULE ORAL at 18:39

## 2018-05-05 RX ADMIN — LORAZEPAM 1 MG: 2 INJECTION INTRAMUSCULAR; INTRAVENOUS at 02:59

## 2018-05-05 RX ADMIN — MORPHINE SULFATE 45 MG: 15 TABLET ORAL at 23:20

## 2018-05-05 RX ADMIN — Medication 10 ML: at 03:01

## 2018-05-05 RX ADMIN — MORPHINE SULFATE 100 MG: 100 TABLET, FILM COATED, EXTENDED RELEASE ORAL at 16:42

## 2018-05-05 RX ADMIN — Medication 10 ML: at 14:22

## 2018-05-05 RX ADMIN — MORPHINE SULFATE 45 MG: 15 TABLET ORAL at 18:48

## 2018-05-05 RX ADMIN — LORAZEPAM 1 MG: 2 INJECTION INTRAMUSCULAR; INTRAVENOUS at 12:22

## 2018-05-05 RX ADMIN — DRONABINOL 2.5 MG: 2.5 CAPSULE ORAL at 10:05

## 2018-05-05 NOTE — PROGRESS NOTES
[facility] General Surgery        Subjective     No acute events, taking diet well, WV and ostomy changed yesterday    Objective     Patient Vitals for the past 24 hrs:   Temp Pulse Resp BP SpO2   05/05/18 0613 - (!) 111 - 103/61 -   05/04/18 2330 98.7 °F (37.1 °C) (!) 114 16 117/74 -   05/04/18 1942 98.2 °F (36.8 °C) (!) 116 16 108/66 97 %   05/04/18 1627 - (!) 114 - 113/61 -   05/04/18 1612 - (!) 111 - 93/55 -   05/04/18 1440 98.4 °F (36.9 °C) (!) 118 16 102/49 95 %   05/04/18 1211 98.3 °F (36.8 °C) (!) 111 16 103/60 93 %   05/04/18 1109 98.5 °F (36.9 °C) (!) 108 16 100/74 97 %   05/04/18 0921 98.5 °F (36.9 °C) (!) 107 16 101/60 95 %         Date 05/04/18 0700 - 05/05/18 0659 05/05/18 0700 - 05/06/18 0659   Shift 0017-3205 4502-5603 24 Hour Total 8895-9206 1696-5492 24 Hour Total   I  N  T  A  K  E   P.O. 480  480         P. O. 480  480       I.V.  (mL/kg/hr) 3914.6  (2.5) 1470.8  (0.9) 5385.4  (1.7)         Volume (lactated Ringers infusion) 3514.6 1470.8 4985. 4         Volume (potassium chloride 10 mEq in 100 ml IVPB) 400  400       Shift Total  (mL/kg) 4394.6  (34.2) 1470.8  (11.3) 5865.4  (44.9)      O  U  T  P  U  T   Urine  (mL/kg/hr) 600  (0.4) 300  (0.2) 900  (0.3)         Urine Voided 600 300 900         Urine Occurrence(s) 2 x  2 x       Drains 6953 5820 84228         Output (ml) (Drain midline abdominal wound 03/15/18 Abdomen) 6575 4300 89903         Output (ml) (Vacuum Assisted Closure Anterior Abdominal) 400 550 950       Stool 0 25 25         Output (ml) (Ileostomy 03/07/18 Lower  Abdomen) 0 25 25       Shift Total  (mL/kg) 7575  (59) 5175  (39.7) 58229  (97.7)      NET -3180.4 -3704.2 -6884.6      Weight (kg) 128.4 130.5 130.5 130.5 130.5 130.5       PE  Pulm - CTAB  CV - RRR  Abd - soft, ND, BS Present, ostomy upper midline viable with WV intact around, LLQ ostomy viable with minimal output    Labs  Recent Results (from the past 12 hour(s)) METABOLIC PANEL, BASIC    Collection Time: 05/05/18  3:47 AM   Result Value Ref Range    Sodium 130 (L) 136 - 145 mmol/L    Potassium 3.2 (L) 3.5 - 5.1 mmol/L    Chloride 84 (L) 97 - 108 mmol/L    CO2 37 (H) 21 - 32 mmol/L    Anion gap 9 5 - 15 mmol/L    Glucose 101 (H) 65 - 100 mg/dL    BUN 11 6 - 20 MG/DL    Creatinine 1.83 (H) 0.55 - 1.02 MG/DL    BUN/Creatinine ratio 6 (L) 12 - 20      GFR est AA 37 (L) >60 ml/min/1.73m2    GFR est non-AA 31 (L) >60 ml/min/1.73m2    Calcium 8.9 8.5 - 10.1 MG/DL   CBC W/O DIFF    Collection Time: 05/05/18  3:47 AM   Result Value Ref Range    WBC 12.0 (H) 3.6 - 11.0 K/uL    RBC 3.10 (L) 3.80 - 5.20 M/uL    HGB 9.1 (L) 11.5 - 16.0 g/dL    HCT 28.5 (L) 35.0 - 47.0 %    MCV 91.9 80.0 - 99.0 FL    MCH 29.4 26.0 - 34.0 PG    MCHC 31.9 30.0 - 36.5 g/dL    RDW 16.8 (H) 11.5 - 14.5 %    PLATELET 906 (H) 405 - 400 K/uL    MPV 9.3 8.9 - 12.9 FL    NRBC 0.0 0  WBC    ABSOLUTE NRBC 0.00 0.00 - 0.01 K/uL         Assessment     Octavia Kwong is a 38 y. o.yr old female with multiple small bowel fistulas    Plan     Continue diet as tolerated, taking supplements as well  Cont WV and wound care per wound care nursing  Cr up a little and will watch this, if it continues to increase may consult nephrology  No TPN needed yet    Brandon Choi MD

## 2018-05-05 NOTE — PROGRESS NOTES
On first rounds pt c/o \"my vac is gonna start leaking\" we reinforced the tape and canged cannisters on the vac; pt called nurse at 1800 and states that she \"is leaking bad now\" nurse and pt were unable to reinforce vac and so we removed the vac and packed wound with dry dressing and abd pad at pt direction. Will continue to monitor pt. Pt tearful and concerned she will not be able to eat or drink and be unable to go to rehab Monday. Pt mom at bedside; tried to provide emotional support and sat with pt a while.

## 2018-05-05 NOTE — PROGRESS NOTES
Patient's pain fairly controlled on scheduled medication, BP being measured in legs due to unreliably values obtained from L arm. Bedside shift change report given to [address] [place] [address] [facility] (oncoming nurse) by Siva Watters RN (offgoing nurse). Report included the following information SBAR, Kardex, Procedure Summary, Intake/Output, MAR, Accordion and Recent Results.

## 2018-05-06 LAB
ANION GAP SERPL CALC-SCNC: 10 MMOL/L (ref 5–15)
BUN SERPL-MCNC: 11 MG/DL (ref 6–20)
BUN/CREAT SERPL: 6 (ref 12–20)
CALCIUM SERPL-MCNC: 8.8 MG/DL (ref 8.5–10.1)
CHLORIDE SERPL-SCNC: 82 MMOL/L (ref 97–108)
CO2 SERPL-SCNC: 37 MMOL/L (ref 21–32)
CREAT SERPL-MCNC: 1.89 MG/DL (ref 0.55–1.02)
ERYTHROCYTE [DISTWIDTH] IN BLOOD BY AUTOMATED COUNT: 16.7 % (ref 11.5–14.5)
GLUCOSE SERPL-MCNC: 92 MG/DL (ref 65–100)
HCT VFR BLD AUTO: 30.9 % (ref 35–47)
HGB BLD-MCNC: 9.8 G/DL (ref 11.5–16)
MCH RBC QN AUTO: 28.5 PG (ref 26–34)
MCHC RBC AUTO-ENTMCNC: 31.7 G/DL (ref 30–36.5)
MCV RBC AUTO: 89.8 FL (ref 80–99)
NRBC # BLD: 0 K/UL (ref 0–0.01)
NRBC BLD-RTO: 0 PER 100 WBC
PLATELET # BLD AUTO: 427 K/UL (ref 150–400)
PMV BLD AUTO: 9.1 FL (ref 8.9–12.9)
POTASSIUM SERPL-SCNC: 3.5 MMOL/L (ref 3.5–5.1)
RBC # BLD AUTO: 3.44 M/UL (ref 3.8–5.2)
SODIUM SERPL-SCNC: 129 MMOL/L (ref 136–145)
WBC # BLD AUTO: 14.5 K/UL (ref 3.6–11)

## 2018-05-06 PROCEDURE — 74011250637 HC RX REV CODE- 250/637: Performed by: NURSE PRACTITIONER

## 2018-05-06 PROCEDURE — 74011250637 HC RX REV CODE- 250/637: Performed by: PHYSICIAN ASSISTANT

## 2018-05-06 PROCEDURE — 36415 COLL VENOUS BLD VENIPUNCTURE: CPT | Performed by: SURGERY

## 2018-05-06 PROCEDURE — 74011250637 HC RX REV CODE- 250/637: Performed by: INTERNAL MEDICINE

## 2018-05-06 PROCEDURE — 74011250637 HC RX REV CODE- 250/637: Performed by: PHYSICAL MEDICINE & REHABILITATION

## 2018-05-06 PROCEDURE — 74011250636 HC RX REV CODE- 250/636: Performed by: SURGERY

## 2018-05-06 PROCEDURE — 65270000029 HC RM PRIVATE

## 2018-05-06 PROCEDURE — 74011250636 HC RX REV CODE- 250/636: Performed by: NURSE PRACTITIONER

## 2018-05-06 PROCEDURE — 80048 BASIC METABOLIC PNL TOTAL CA: CPT | Performed by: SURGERY

## 2018-05-06 PROCEDURE — 85027 COMPLETE CBC AUTOMATED: CPT | Performed by: SURGERY

## 2018-05-06 RX ORDER — SODIUM CHLORIDE 9 MG/ML
INJECTION INTRAMUSCULAR; INTRAVENOUS; SUBCUTANEOUS
Status: DISPENSED
Start: 2018-05-06 | End: 2018-05-07

## 2018-05-06 RX ADMIN — Medication 10 ML: at 06:06

## 2018-05-06 RX ADMIN — MORPHINE SULFATE 45 MG: 15 TABLET ORAL at 18:01

## 2018-05-06 RX ADMIN — MORPHINE SULFATE 100 MG: 100 TABLET, FILM COATED, EXTENDED RELEASE ORAL at 08:05

## 2018-05-06 RX ADMIN — MORPHINE SULFATE 115 MG: 15 TABLET, EXTENDED RELEASE ORAL at 23:07

## 2018-05-06 RX ADMIN — MORPHINE SULFATE 45 MG: 15 TABLET ORAL at 10:37

## 2018-05-06 RX ADMIN — DRONABINOL 2.5 MG: 2.5 CAPSULE ORAL at 09:10

## 2018-05-06 RX ADMIN — SODIUM CHLORIDE, SODIUM LACTATE, POTASSIUM CHLORIDE, AND CALCIUM CHLORIDE 125 ML/HR: 600; 310; 30; 20 INJECTION, SOLUTION INTRAVENOUS at 08:05

## 2018-05-06 RX ADMIN — Medication 10 ML: at 23:09

## 2018-05-06 RX ADMIN — Medication 10 ML: at 10:00

## 2018-05-06 RX ADMIN — MORPHINE SULFATE 45 MG: 15 TABLET ORAL at 06:57

## 2018-05-06 RX ADMIN — LORAZEPAM 1 MG: 2 INJECTION INTRAMUSCULAR; INTRAVENOUS at 19:21

## 2018-05-06 RX ADMIN — PROCHLORPERAZINE EDISYLATE 5 MG: 5 INJECTION INTRAMUSCULAR; INTRAVENOUS at 13:44

## 2018-05-06 RX ADMIN — MORPHINE SULFATE 45 MG: 15 TABLET ORAL at 23:08

## 2018-05-06 RX ADMIN — LORAZEPAM 1 MG: 2 INJECTION INTRAMUSCULAR; INTRAVENOUS at 06:04

## 2018-05-06 RX ADMIN — ENOXAPARIN SODIUM 40 MG: 100 INJECTION SUBCUTANEOUS at 09:10

## 2018-05-06 RX ADMIN — DRONABINOL 2.5 MG: 2.5 CAPSULE ORAL at 18:01

## 2018-05-06 RX ADMIN — MORPHINE SULFATE 45 MG: 15 TABLET ORAL at 03:14

## 2018-05-06 RX ADMIN — MORPHINE SULFATE 45 MG: 15 TABLET ORAL at 15:09

## 2018-05-06 RX ADMIN — MORPHINE SULFATE 100 MG: 100 TABLET, FILM COATED, EXTENDED RELEASE ORAL at 16:08

## 2018-05-06 RX ADMIN — MORPHINE SULFATE 30 MG: 15 TABLET ORAL at 06:04

## 2018-05-06 RX ADMIN — ASPIRIN 325 MG: 325 TABLET ORAL at 09:10

## 2018-05-06 NOTE — PROGRESS NOTES
RN put a call out to Simón Fernandez, the doctor on call for General Surgery.  was made aware that patient's wound vac was leaking and had to come off. Now doing dressing changes of wound.  said to continue reinforcing over night as needed and would make Dr Arvind Beach aware (Dr Arvind Beach is on call tomorrow day shift).

## 2018-05-06 NOTE — PROGRESS NOTES
General Surgery Daily Progress Note    Admit Date: 3/7/2018  Post-Operative Day: 24 Days Post-Op from Procedure(s):  OPEN JEJUNOSTOMY TUBE PLACEMENT UNDER FLURO/ WOUND VAC CHANGE. Subjective:     Last 24 hrs: Wound vac seal broke. Now wound/ high-output fistula saturating dressing frequently. Mother concerned that she is not able to rest well. Tolerating PO intake & Vital 1.5 shakes      Objective:     Blood pressure 102/58, pulse (!) 118, temperature 98.6 °F (37 °C), resp. rate 16, height 5' 4\" (1.626 m), weight 280 lb 6.4 oz (127.2 kg), SpO2 97 %. Temp (24hrs), Av.5 °F (36.9 °C), Min:98.2 °F (36.8 °C), Max:98.7 °F (37.1 °C)      _____________________  Physical Exam:     Alert and Oriented, resting. Cardiovascular: RRR, chronic edema  Lungs: no resp distress  Abdomen: wound vac off. High output of yellow billious material. Ostomy with liquid in appliance. 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)            Plan:     Wound care & dressing changes frequently as needed. PO analgesics as ordered. Oral intake as tolerated. Further treatment per Dr. Bravo Maddox    Data Review:    Recent Labs      18   0322  18   0347  18   0533   WBC  14.5*  12.0*  11.8*   HGB  9.8*  9.1*  8.9*   HCT  30.9*  28.5*  28.2*   PLT  427*  401*  372     Recent Labs      18   0322  18   0347  18   0533   NA  129*  130*  129*   K  3.5  3.2*  3.1*   CL  82*  84*  85*   CO2  37*  37*  35*   GLU  92  101*  112*   BUN  11  11  10   CREA  1.89*  1.83*  1.65*   CA  8.8  8.9  8.3*     No results for input(s): AML, LPSE in the last 72 hours.         ______________________  Medications:    Current Facility-Administered Medications   Medication Dose Route Frequency    guaiFENesin (ROBITUSSIN) 100 mg/5 mL oral liquid 100 mg  100 mg Oral Q4H PRN    lactated Ringers infusion  125 mL/hr IntraVENous CONTINUOUS    morphine IR (MS IR) tablet 45 mg  45 mg Oral Q4H    morphine IR (MS IR) tablet 30 mg  30 mg Oral DAILY PRN    lidocaine (LIDODERM) 5 % patch 2 Patch  2 Patch TransDERmal Q24H    0.9% sodium chloride infusion 250 mL  250 mL IntraVENous PRN    morphine CR (MS CONTIN) tablet 115 mg  115 mg Oral QHS    morphine CR (MS CONTIN) tablet 100 mg  100 mg Oral Q24H    morphine CR (MS CONTIN) tablet 100 mg  100 mg Oral Q24H    aspirin (ASPIRIN) tablet 325 mg  325 mg Oral DAILY    0.9% sodium chloride infusion 250 mL  250 mL IntraVENous PRN    acetaminophen (TYLENOL) tablet 650 mg  650 mg Oral Q6H PRN    diphenhydrAMINE (BENADRYL) capsule 25 mg  25 mg Oral Q6H PRN    enoxaparin (LOVENOX) injection 40 mg  40 mg SubCUTAneous Q24H    nystatin (MYCOSTATIN) 100,000 unit/gram cream   Topical BID    prochlorperazine (COMPAZINE) with saline injection 5 mg  5 mg IntraVENous Q6H PRN    naloxone (NARCAN) injection 0.4 mg  0.4 mg IntraVENous EVERY 2 MINUTES AS NEEDED    glucose chewable tablet 16 g  4 Tab Oral PRN    dextrose (D50W) injection syrg 12.5-25 g  12.5-25 g IntraVENous PRN    glucagon (GLUCAGEN) injection 1 mg  1 mg IntraMUSCular PRN    scopolamine (TRANSDERM-SCOP) 1 mg over 3 days 1 Patch  1 Patch TransDERmal Q72H    sodium chloride (NS) flush 10 mL  10 mL InterCATHeter Q24H    sodium chloride (NS) flush 10 mL  10 mL InterCATHeter Q8H    alteplase (CATHFLO) 1 mg in sterile water (preservative free) 1 mL injection  1 mg InterCATHeter PRN    bacitracin 500 unit/gram packet 1 Packet  1 Packet Topical PRN    sodium chloride (NS) flush 20 mL  20 mL InterCATHeter PRN    sodium chloride (NS) flush 10 mL  10 mL InterCATHeter Q24H    sodium chloride (NS) flush 10 mL  10 mL InterCATHeter PRN    sodium chloride (NS) flush 10 mL  10 mL InterCATHeter Q8H    dronabinol (MARINOL) capsule 2.5 mg  2.5 mg Oral BID    phenol throat spray (CHLORASEPTIC) 1 Spray  1 Spray Oral PRN    ondansetron (ZOFRAN) injection 4 mg  4 mg IntraVENous Q4H PRN    promethazine (PHENERGAN) 12.5 mg in 0.9% sodium chloride 50 mL IVPB  12.5 mg IntraVENous Q6H PRN    LORazepam (ATIVAN) injection 1 mg  1 mg IntraVENous Q6H PRN    albuterol (PROVENTIL VENTOLIN) nebulizer solution 2.5 mg  2.5 mg Nebulization Q4H PRN       Shailesh Hanna PA-C  5/6/2018

## 2018-05-06 NOTE — PROGRESS NOTES
Bedside shift change report given to Saira Virk (oncoming nurse) by Janel Franklin (offgoing nurse). Report included the following information SBAR.

## 2018-05-07 LAB
ANION GAP SERPL CALC-SCNC: 10 MMOL/L (ref 5–15)
BUN SERPL-MCNC: 12 MG/DL (ref 6–20)
BUN/CREAT SERPL: 7 (ref 12–20)
CALCIUM SERPL-MCNC: 8.7 MG/DL (ref 8.5–10.1)
CHLORIDE SERPL-SCNC: 84 MMOL/L (ref 97–108)
CO2 SERPL-SCNC: 37 MMOL/L (ref 21–32)
CREAT SERPL-MCNC: 1.73 MG/DL (ref 0.55–1.02)
ERYTHROCYTE [DISTWIDTH] IN BLOOD BY AUTOMATED COUNT: 16.9 % (ref 11.5–14.5)
GLUCOSE SERPL-MCNC: 103 MG/DL (ref 65–100)
HCT VFR BLD AUTO: 29.4 % (ref 35–47)
HGB BLD-MCNC: 9.4 G/DL (ref 11.5–16)
MCH RBC QN AUTO: 28.9 PG (ref 26–34)
MCHC RBC AUTO-ENTMCNC: 32 G/DL (ref 30–36.5)
MCV RBC AUTO: 90.5 FL (ref 80–99)
NRBC # BLD: 0 K/UL (ref 0–0.01)
NRBC BLD-RTO: 0 PER 100 WBC
PLATELET # BLD AUTO: 412 K/UL (ref 150–400)
PMV BLD AUTO: 9.3 FL (ref 8.9–12.9)
POTASSIUM SERPL-SCNC: 3.1 MMOL/L (ref 3.5–5.1)
RBC # BLD AUTO: 3.25 M/UL (ref 3.8–5.2)
SODIUM SERPL-SCNC: 131 MMOL/L (ref 136–145)
WBC # BLD AUTO: 12.1 K/UL (ref 3.6–11)

## 2018-05-07 PROCEDURE — 80048 BASIC METABOLIC PNL TOTAL CA: CPT | Performed by: SURGERY

## 2018-05-07 PROCEDURE — 74011250637 HC RX REV CODE- 250/637: Performed by: PHYSICAL MEDICINE & REHABILITATION

## 2018-05-07 PROCEDURE — 36415 COLL VENOUS BLD VENIPUNCTURE: CPT | Performed by: SURGERY

## 2018-05-07 PROCEDURE — 97530 THERAPEUTIC ACTIVITIES: CPT

## 2018-05-07 PROCEDURE — 74011250636 HC RX REV CODE- 250/636: Performed by: SURGERY

## 2018-05-07 PROCEDURE — 77030018717 HC DRSG GRNUFM KCON -B

## 2018-05-07 PROCEDURE — 74011250637 HC RX REV CODE- 250/637: Performed by: NURSE PRACTITIONER

## 2018-05-07 PROCEDURE — 74011250637 HC RX REV CODE- 250/637: Performed by: PHYSICIAN ASSISTANT

## 2018-05-07 PROCEDURE — 77030010520

## 2018-05-07 PROCEDURE — 85027 COMPLETE CBC AUTOMATED: CPT | Performed by: SURGERY

## 2018-05-07 PROCEDURE — 74011250637 HC RX REV CODE- 250/637: Performed by: SURGERY

## 2018-05-07 PROCEDURE — 74011250637 HC RX REV CODE- 250/637: Performed by: INTERNAL MEDICINE

## 2018-05-07 PROCEDURE — 74011250636 HC RX REV CODE- 250/636: Performed by: NURSE PRACTITIONER

## 2018-05-07 PROCEDURE — 97606 NEG PRS WND THER DME>50 SQCM: CPT

## 2018-05-07 PROCEDURE — 65270000029 HC RM PRIVATE

## 2018-05-07 RX ORDER — POTASSIUM CHLORIDE 750 MG/1
40 TABLET, FILM COATED, EXTENDED RELEASE ORAL 2 TIMES DAILY
Status: DISCONTINUED | OUTPATIENT
Start: 2018-05-07 | End: 2018-05-12

## 2018-05-07 RX ADMIN — Medication 10 ML: at 06:48

## 2018-05-07 RX ADMIN — Medication 10 ML: at 09:05

## 2018-05-07 RX ADMIN — MORPHINE SULFATE 100 MG: 100 TABLET, FILM COATED, EXTENDED RELEASE ORAL at 07:54

## 2018-05-07 RX ADMIN — MORPHINE SULFATE 45 MG: 15 TABLET ORAL at 06:46

## 2018-05-07 RX ADMIN — ONDANSETRON HYDROCHLORIDE 4 MG: 2 INJECTION INTRAMUSCULAR; INTRAVENOUS at 21:21

## 2018-05-07 RX ADMIN — DRONABINOL 2.5 MG: 2.5 CAPSULE ORAL at 09:04

## 2018-05-07 RX ADMIN — MORPHINE SULFATE 45 MG: 15 TABLET ORAL at 19:33

## 2018-05-07 RX ADMIN — SODIUM CHLORIDE, SODIUM LACTATE, POTASSIUM CHLORIDE, AND CALCIUM CHLORIDE 125 ML/HR: 600; 310; 30; 20 INJECTION, SOLUTION INTRAVENOUS at 06:46

## 2018-05-07 RX ADMIN — Medication 10 ML: at 09:06

## 2018-05-07 RX ADMIN — POTASSIUM CHLORIDE 40 MEQ: 750 TABLET, FILM COATED, EXTENDED RELEASE ORAL at 17:51

## 2018-05-07 RX ADMIN — MORPHINE SULFATE 45 MG: 15 TABLET ORAL at 10:56

## 2018-05-07 RX ADMIN — ASPIRIN 325 MG: 325 TABLET ORAL at 09:03

## 2018-05-07 RX ADMIN — SODIUM CHLORIDE, SODIUM LACTATE, POTASSIUM CHLORIDE, AND CALCIUM CHLORIDE 125 ML/HR: 600; 310; 30; 20 INJECTION, SOLUTION INTRAVENOUS at 19:35

## 2018-05-07 RX ADMIN — MORPHINE SULFATE 45 MG: 15 TABLET ORAL at 15:49

## 2018-05-07 RX ADMIN — MORPHINE SULFATE 45 MG: 15 TABLET ORAL at 22:59

## 2018-05-07 RX ADMIN — MORPHINE SULFATE 115 MG: 15 TABLET, EXTENDED RELEASE ORAL at 22:59

## 2018-05-07 RX ADMIN — MORPHINE SULFATE 45 MG: 15 TABLET ORAL at 03:10

## 2018-05-07 RX ADMIN — DRONABINOL 2.5 MG: 2.5 CAPSULE ORAL at 17:51

## 2018-05-07 RX ADMIN — ENOXAPARIN SODIUM 40 MG: 100 INJECTION SUBCUTANEOUS at 09:04

## 2018-05-07 RX ADMIN — Medication 10 ML: at 21:21

## 2018-05-07 RX ADMIN — POTASSIUM CHLORIDE 40 MEQ: 750 TABLET, FILM COATED, EXTENDED RELEASE ORAL at 09:04

## 2018-05-07 RX ADMIN — MORPHINE SULFATE 30 MG: 15 TABLET ORAL at 09:03

## 2018-05-07 RX ADMIN — MORPHINE SULFATE 100 MG: 100 TABLET, FILM COATED, EXTENDED RELEASE ORAL at 15:49

## 2018-05-07 RX ADMIN — PROCHLORPERAZINE EDISYLATE 5 MG: 5 INJECTION INTRAMUSCULAR; INTRAVENOUS at 18:02

## 2018-05-07 RX ADMIN — NYSTATIN: 100000 CREAM TOPICAL at 17:52

## 2018-05-07 NOTE — PROGRESS NOTES
1104: Patient in tears, crying saying that the IV team nurse wasn't listening to her as she was doing the PICC line dressing change, asked her to peel the dressing off slowly, but the nurse ripped it off causing her skin to tear. Called patient advocacy and notified them of situation. Patient also informed this RN that she lost a dusty silver ring that was her best friend's who has passed away and the patient is distressed that it's gone. Searched the room and the linen and was unable to find it. Notified nurse manager and patient advocate.

## 2018-05-07 NOTE — PROGRESS NOTES
Bedside shift change report given to Aida Al RN (oncoming nurse) by Cushing, RN (offgoing nurse). Report included the following information SBAR, Kardex, Intake/Output, MAR and Recent Results.

## 2018-05-07 NOTE — PROGRESS NOTES
Recent events - noted. No specific complaints today. Tm 98.8 HR: 105 BP: 107/50 Resp Rate: 18 90% sat on room air. Intake/Output Summary (Last 24 hours) at 05/07/18 0822  Last data filed at 05/06/18 1326   Gross per 24 hour   Intake              360 ml   Output              750 ml   Net             -390 ml   Exam: Cor: RRR. Lungs: Bilateral breath sounds. Clear to auscultation. Abd: Soft. Non distended. Non tender. Wound dressed. Labs:   Recent Results (from the past 12 hour(s))   METABOLIC PANEL, BASIC    Collection Time: 05/07/18  3:16 AM   Result Value Ref Range    Sodium 131 (L) 136 - 145 mmol/L    Potassium 3.1 (L) 3.5 - 5.1 mmol/L    Chloride 84 (L) 97 - 108 mmol/L    CO2 37 (H) 21 - 32 mmol/L    Anion gap 10 5 - 15 mmol/L    Glucose 103 (H) 65 - 100 mg/dL    BUN 12 6 - 20 MG/DL    Creatinine 1.73 (H) 0.55 - 1.02 MG/DL    BUN/Creatinine ratio 7 (L) 12 - 20      GFR est AA 40 (L) >60 ml/min/1.73m2    GFR est non-AA 33 (L) >60 ml/min/1.73m2    Calcium 8.7 8.5 - 10.1 MG/DL   CBC W/O DIFF    Collection Time: 05/07/18  3:16 AM   Result Value Ref Range    WBC 12.1 (H) 3.6 - 11.0 K/uL    RBC 3.25 (L) 3.80 - 5.20 M/uL    HGB 9.4 (L) 11.5 - 16.0 g/dL    HCT 29.4 (L) 35.0 - 47.0 %    MCV 90.5 80.0 - 99.0 FL    MCH 28.9 26.0 - 34.0 PG    MCHC 32.0 30.0 - 36.5 g/dL    RDW 16.9 (H) 11.5 - 14.5 %    PLATELET 174 (H) 344 - 400 K/uL    MPV 9.3 8.9 - 12.9 FL    NRBC 0.0 0  WBC    ABSOLUTE NRBC 0.00 0.00 - 0.01 K/uL   Diet and supplements as tolerated. Will change IVF to 125cc/hour x 12 hours. Replace K 3.1. BMP, Mg and Phos in AM.   Wound care nurses to see. Continue PT and OT. Pre albumin - 9.7 - will restart TPN. Pain medication per Palliative Care.

## 2018-05-07 NOTE — PROGRESS NOTES
Bedside and Verbal shift change report given to [facility] (oncoming nurse) by Yao Sarabia (offgoing nurse). Report included the following information SBAR, Kardex, MAR and Recent Results.

## 2018-05-07 NOTE — PROGRESS NOTES
NUTRITION       Recommendations:  1. Replete potassium and check magnesium and phosphorus    2. If TPN initiated, would start with 6%AA D20 @ 63 mL/hr + MVI, thiamine  -- Check potassium, phosphorus and magnesium daily  -- If lytes are WNL, goal cyclic TPN would be 6%AA D20 @ 90 mL/hr x 1 hour; 155 mL/hr x 12 hours; 90 mL/hr x last hour + 20% lipids, 250 mL 3x/week    3. Continue daily weights (pt needs to have a standing scale weight at least weekly when she is up with PT-- last standing weight is from 4/27)     Consult received for TPN recommendations. Chart reviewed, discussed with RN. Pt is having a bad day-- she lost a sentimental piece of jewelry and is frustrated about the thought of having to go back on TPN. Events with her wound vac and drain noted. She admits that her intake on Saturday/Sunday was decreased secondary to leakage (she makes herself NPO during these episodes to decrease the output). On 5/4, the pt's output (wound vac + fistula) was almost 23265 mL! Noted plans to resume TPN. The pt said she pleaded with the surgeon to give her another day and allow her to eat. She is fearful that if they resume TPN she will become edematous and \"not be able to move again\". Discussed that with such how output, it's difficult to determine what she is absorbing and whether or not she can maintain. Output is significantly higher than her recorded intake, but part of this is related to documentation. Will need to continue strict documentation of all po/supplement/fluid intake. Above TPN goal to provide a daily average of 2091 kcal, 122 g protein in 2040 mL (2290 mL on lipid days)-- meeting 100% of estimated needs. Discussed pt's fluid intake. Will add G2 for her to try for electrolyte repletion. May need to add a salt packet to this to help replete electrolytes further.     Output (5/3-5/5):  Fistula Drain Output:   5/5: 4300 mL  5/4: 10,875 mL  5/3: 3600 mL Wound Vac:  5/5: 1250 mL  5/4: 950 mL  5/3: 1950 mL Ileostomy:  5/5: 0 mL  5/4: 25 mL  5/3: 0 mL     Weight continues to decrease, but last standing weight is from 4/27. Pt is aware of the need for standing weights, but does not ask PT to obtain during their evals. She is reluctant today and asked, \"can I do it tomorrow? I'm having the worst day\". Need to obtain an updated baseline. If current bed weight is true, she has lost 48# over the past 24 days, which is severe. Estimated Nutrition Needs:   Kcals/day: 2041 Kcals/day (2569-4879 kcal/day (MSJ x 1-1.1))  Protein: 111 g (111-152 g/day (0.8-1.1 kg))  Fluid: 2250 ml (or 1 mL/kcal or per output)     Based On:  Kristen Ozuna  Weight Used: Actual wt (138.6 kg (standing on 4/27))    Jax Ghosh, [address]

## 2018-05-07 NOTE — PROGRESS NOTES
Follow up visit with Ms. Kwong. Pt deferred visit, requesting  return another day (pt shared that it's been a difficult day and she was trying to get some rest). Offered assurance of prayers. Will follow-up as able.     Bhumika Rangel, Palliative

## 2018-05-07 NOTE — PROGRESS NOTES
Reviewed medical chart; called and left a voicemail message for Mishel Diaz RN liaison for [facility], to update her (U#[phone]); also spoke with Jenna Kawasaki, RN liaison, (V#[phone]). Per attending MD, the patient will be discharging with TPN. Requested that an authorization request be sent to [facility] again as soon as possible. Care Management will continue to follow her disposition.    ROBERT Leo

## 2018-05-07 NOTE — PROGRESS NOTES
Attempted follow-up visit with Ms. Kwong. Pt's chart reviewed prior to visit. Did not visit pt at this time as another staff member was arriving to see pt. Will attempt to follow-up as able; please page [phone] for  support as needed.     Bhumika Garcia, Palliative

## 2018-05-07 NOTE — PROGRESS NOTES
Physical Therapy  5.7.18    Chart reviewed, spoke with PORTER who attempted treatment and patient deferring all OOB activity today due to impaired sleep and wound vac change this AM. Will f/u tomorrow for OOB mobility. Thank you.      Jojo Machado, PT, DPT

## 2018-05-07 NOTE — PROGRESS NOTES
Dressing change done. Noted skin tear on the ac area where the old dressing was. New dressing applied both site.

## 2018-05-07 NOTE — PROGRESS NOTES
Bedside shift change report given to Jean-Claude Baker RN (oncoming nurse) by MAURI HEREDIA (offgoing nurse). Report included the following information SBAR, Kardex, Intake/Output, MAR and Recent Results.

## 2018-05-07 NOTE — WOUND CARE
SHANE Note:     Follow-up visit for [facility] dressing change and fistula management. Chart shows:  Admitted for fistula takdown 3/7/18 by Dr. Gadiel Jackson with [facility] placement in [address] [place] [address]; history of multiple abdominal surgeries and Crohns disease. Admitted from home. Mother able to provide assistance in pouching prior to admission and became very competent in doing so.       Assessment:   Patient is A&O x 4, continent and mobile - moves around room with assistance & has been working with PT.     Bed: total care bariatric      1. Midline abdominal surgical wound = 12 x 11.5 x 1.8 cm  60% pink/red granular wound base and 40% stomatized mucus membrane centrally with  peristalsis and output @ 4 o'clock. 1 piece of black sponge with central hole to allow for pouching of the fistulas (cut 1/2 thickness); 3 Hawk's rings placed around hole top and bottom to achieve seal.  After seal achieved, a 2-piece pouch was placed to collect fistula output with 2 additional rings. 50 mmHg continuous suction. Wound Recommendations:    Maintain VAC as ordered @ 50 mmHg continuous suction. Skin Care & Pressure Relief Recommendations:  Minimize layers of linen/pads under patient to optimize support surface. Turn/reposition approximately every 2 hours and offload heels. Discussed above plan with patient, her mother, RN, & Dr. Ana Thurman. Transition of Care: Plan to follow as needed while admitted to hospital.  Will likely move to pouching only soon since the wound bed is almost 1/2 stoma tissue now.      YOLANDA GuerreroN, RN, [facility]  Certified Wound, Ostomy, Continence Nurse  office [phone]  pager [phone] or call  to page

## 2018-05-07 NOTE — PROGRESS NOTES
Occupational Therapy: cleared by nurse; however, nurse reports that patient had difficult weekend with disrupted wound vac and leaking fluid from fistula on patient and bed until wound vac replaced today. Received patient in bed with mother present. Patient tearful, reporting events of the weekend and poor sleep. Patient and mother stating that patient has been instructed to rest today due to impaired sleep, abdominal suction to wall and new abdominal wound vac. Patient agreeable to performing bed exercises on her own throughout the day and sitting in modified chair position for all meals. Patient placed in modified chair position as she is awaiting her lunch. She is agreeable to proceed with OOB tomorrow. Recommend Inpatient rehab.   Carmell Angelucci, COTA/SOCORRO

## 2018-05-08 LAB
ANION GAP SERPL CALC-SCNC: 9 MMOL/L (ref 5–15)
BUN SERPL-MCNC: 12 MG/DL (ref 6–20)
BUN/CREAT SERPL: 7 (ref 12–20)
CALCIUM SERPL-MCNC: 8.5 MG/DL (ref 8.5–10.1)
CHLORIDE SERPL-SCNC: 83 MMOL/L (ref 97–108)
CO2 SERPL-SCNC: 37 MMOL/L (ref 21–32)
CREAT SERPL-MCNC: 1.63 MG/DL (ref 0.55–1.02)
ERYTHROCYTE [DISTWIDTH] IN BLOOD BY AUTOMATED COUNT: 16.8 % (ref 11.5–14.5)
GLUCOSE SERPL-MCNC: 86 MG/DL (ref 65–100)
HCT VFR BLD AUTO: 29.2 % (ref 35–47)
HGB BLD-MCNC: 9.4 G/DL (ref 11.5–16)
MAGNESIUM SERPL-MCNC: 1.1 MG/DL (ref 1.6–2.4)
MCH RBC QN AUTO: 29 PG (ref 26–34)
MCHC RBC AUTO-ENTMCNC: 32.2 G/DL (ref 30–36.5)
MCV RBC AUTO: 90.1 FL (ref 80–99)
NRBC # BLD: 0 K/UL (ref 0–0.01)
NRBC BLD-RTO: 0 PER 100 WBC
PHOSPHATE SERPL-MCNC: 3.5 MG/DL (ref 2.6–4.7)
PLATELET # BLD AUTO: 380 K/UL (ref 150–400)
PMV BLD AUTO: 9.2 FL (ref 8.9–12.9)
POTASSIUM SERPL-SCNC: 3.2 MMOL/L (ref 3.5–5.1)
RBC # BLD AUTO: 3.24 M/UL (ref 3.8–5.2)
SODIUM SERPL-SCNC: 129 MMOL/L (ref 136–145)
WBC # BLD AUTO: 13.8 K/UL (ref 3.6–11)

## 2018-05-08 PROCEDURE — 74011250636 HC RX REV CODE- 250/636: Performed by: SURGERY

## 2018-05-08 PROCEDURE — 77030011641 HC PASTE OST ADH BMS -A

## 2018-05-08 PROCEDURE — 97116 GAIT TRAINING THERAPY: CPT

## 2018-05-08 PROCEDURE — 74011250637 HC RX REV CODE- 250/637: Performed by: NURSE PRACTITIONER

## 2018-05-08 PROCEDURE — 74011250637 HC RX REV CODE- 250/637: Performed by: PHYSICIAN ASSISTANT

## 2018-05-08 PROCEDURE — 74011000258 HC RX REV CODE- 258: Performed by: SURGERY

## 2018-05-08 PROCEDURE — 74011000250 HC RX REV CODE- 250: Performed by: SURGERY

## 2018-05-08 PROCEDURE — 74011250636 HC RX REV CODE- 250/636: Performed by: NURSE PRACTITIONER

## 2018-05-08 PROCEDURE — 85027 COMPLETE CBC AUTOMATED: CPT | Performed by: SURGERY

## 2018-05-08 PROCEDURE — 74011250637 HC RX REV CODE- 250/637: Performed by: INTERNAL MEDICINE

## 2018-05-08 PROCEDURE — 83735 ASSAY OF MAGNESIUM: CPT | Performed by: SURGERY

## 2018-05-08 PROCEDURE — 74011250637 HC RX REV CODE- 250/637: Performed by: PHYSICAL MEDICINE & REHABILITATION

## 2018-05-08 PROCEDURE — 80048 BASIC METABOLIC PNL TOTAL CA: CPT | Performed by: SURGERY

## 2018-05-08 PROCEDURE — 36415 COLL VENOUS BLD VENIPUNCTURE: CPT | Performed by: SURGERY

## 2018-05-08 PROCEDURE — 84100 ASSAY OF PHOSPHORUS: CPT | Performed by: SURGERY

## 2018-05-08 PROCEDURE — 74011250637 HC RX REV CODE- 250/637: Performed by: SURGERY

## 2018-05-08 PROCEDURE — 65270000029 HC RM PRIVATE

## 2018-05-08 RX ADMIN — MORPHINE SULFATE 45 MG: 15 TABLET ORAL at 19:07

## 2018-05-08 RX ADMIN — MORPHINE SULFATE 45 MG: 15 TABLET ORAL at 07:12

## 2018-05-08 RX ADMIN — LORAZEPAM 1 MG: 2 INJECTION INTRAMUSCULAR; INTRAVENOUS at 16:54

## 2018-05-08 RX ADMIN — MORPHINE SULFATE 45 MG: 15 TABLET ORAL at 23:13

## 2018-05-08 RX ADMIN — DRONABINOL 2.5 MG: 2.5 CAPSULE ORAL at 17:00

## 2018-05-08 RX ADMIN — MORPHINE SULFATE 45 MG: 15 TABLET ORAL at 10:23

## 2018-05-08 RX ADMIN — POTASSIUM CHLORIDE 40 MEQ: 750 TABLET, FILM COATED, EXTENDED RELEASE ORAL at 17:00

## 2018-05-08 RX ADMIN — DRONABINOL 2.5 MG: 2.5 CAPSULE ORAL at 08:53

## 2018-05-08 RX ADMIN — MORPHINE SULFATE 45 MG: 15 TABLET ORAL at 14:41

## 2018-05-08 RX ADMIN — MORPHINE SULFATE 100 MG: 100 TABLET, FILM COATED, EXTENDED RELEASE ORAL at 08:32

## 2018-05-08 RX ADMIN — Medication 10 ML: at 09:00

## 2018-05-08 RX ADMIN — SODIUM CHLORIDE, SODIUM LACTATE, POTASSIUM CHLORIDE, AND CALCIUM CHLORIDE 125 ML/HR: 600; 310; 30; 20 INJECTION, SOLUTION INTRAVENOUS at 03:47

## 2018-05-08 RX ADMIN — Medication 10 ML: at 14:07

## 2018-05-08 RX ADMIN — NYSTATIN: 100000 CREAM TOPICAL at 08:53

## 2018-05-08 RX ADMIN — Medication 10 ML: at 07:13

## 2018-05-08 RX ADMIN — Medication 10 ML: at 23:13

## 2018-05-08 RX ADMIN — ASPIRIN 325 MG: 325 TABLET ORAL at 08:53

## 2018-05-08 RX ADMIN — FAMOTIDINE: 10 INJECTION, SOLUTION INTRAVENOUS at 18:06

## 2018-05-08 RX ADMIN — ONDANSETRON HYDROCHLORIDE 4 MG: 2 INJECTION INTRAMUSCULAR; INTRAVENOUS at 14:05

## 2018-05-08 RX ADMIN — ENOXAPARIN SODIUM 40 MG: 100 INJECTION SUBCUTANEOUS at 09:00

## 2018-05-08 RX ADMIN — MORPHINE SULFATE 100 MG: 100 TABLET, FILM COATED, EXTENDED RELEASE ORAL at 15:54

## 2018-05-08 RX ADMIN — MORPHINE SULFATE 30 MG: 15 TABLET ORAL at 11:00

## 2018-05-08 RX ADMIN — MORPHINE SULFATE 115 MG: 15 TABLET, EXTENDED RELEASE ORAL at 23:13

## 2018-05-08 RX ADMIN — MORPHINE SULFATE 45 MG: 15 TABLET ORAL at 03:47

## 2018-05-08 RX ADMIN — ONDANSETRON HYDROCHLORIDE 4 MG: 2 INJECTION INTRAMUSCULAR; INTRAVENOUS at 02:27

## 2018-05-08 RX ADMIN — POTASSIUM CHLORIDE 40 MEQ: 750 TABLET, FILM COATED, EXTENDED RELEASE ORAL at 08:53

## 2018-05-08 RX ADMIN — Medication 10 ML: at 07:14

## 2018-05-08 NOTE — PROGRESS NOTES
Follow up visit with Jim Franco. Pt was tearful today and expressed her sense of discouragement. Offered emotional and spiritual support. Normalized her emotions and explored her hope. Visit ended to allow pt some time to rest. Offered spoken words of comfort and prayer.     Bhumika Peoples, Palliative

## 2018-05-08 NOTE — PROGRESS NOTES
Problem: Mobility Impaired (Adult and Pediatric)  Goal: *Acute Goals and Plan of Care (Insert Text)  Physical Therapy Goals  Goals reassessed 5/4/18, updated and remain appropriate over next 7 days  Continue progress towards current goals 4/26/18  Update 4/18/2018  1. Patient will move from supine to sit and sit to supine  in bed with minimal assistance  within 7 day(s). MET on 5/4/18-Updated goal:  Patient will move from supine to sit and sit to supine  in bed with supervision within 7 day(s). 2.  Patient will transfer from bed to chair and chair to bed with minimal assistance  using the least restrictive device within 7 day(s). MET on 5/4/18-Updated goal:  Patient will transfer from bed to chair and chair to bed with supervision using the least restrictive device within 7 day(s). 3.  Patient will perform sit to stand with SBA within 7 days  4. Patient will ambulate 150' with SBA x 1 using LRAD within 7 days. Update 4/6/2018  1. Patient will move from supine to sit and sit to supine  in bed with moderate assistance  within 7 day(s). 2.  Patient will transfer from bed to chair and chair to bed with moderate assistance  using the least restrictive device within 7 day(s). 3.  Patient will perform sit to stand with CGA within 7 days  4. Patient will ambulate 22' with moderate assist x 1 using LRAD within 7 days. Revised 3/27/2018  1. Patient will move from supine to sit and sit to supine  in bed with moderate assistance  within 7 day(s). 2.  Patient will transfer from bed to chair and chair to bed with moderate assistance  using the least restrictive device within 7 day(s). 3.  Patient will perform sit to stand with moderate assistance  within 7 day(s). 4.  Patient will ambulate 100' with moderate assist x1 using the least restrictive device within 7 day(s). Revisited 3/19/2018, goals remain appropriate, carry over    Physical Therapy Goals  Initiated 3/8/2018  1.   Patient will move from supine to sit and sit to supine  in bed with moderate assistance  within 7 day(s). 2.  Patient will transfer from bed to chair and chair to bed with moderate assistance  using the least restrictive device within 7 day(s). 3.  Patient will perform sit to stand with moderate assistance  within 7 day(s). 4.  PT will assess gait with the least restrictive device within 7 day(s). physical Therapy TREATMENT  Patient: Billy Chandler (38 y.o. female)  Date: 5/8/2018  Diagnosis: FISTULA  Small bowel fistula  INTEROCUTANOUS FISTULA <principal problem not specified>  Procedure(s) (LRB):  OPEN JEJUNOSTOMY TUBE PLACEMENT UNDER FLURO/ WOUND VAC CHANGE. (N/A) 26 Days Post-Op  Precautions: Fall, Skin, Contact  Chart, physical therapy assessment, plan of care and goals were reviewed. ASSESSMENT:  Pt tearful during treatment. Pt needed minimal encouragement to participate. Pt was able to ambulate with the rolling walker. Deferred to attempt gait without A.D. Due to not standing in a few days. Pt reports tightness in her calves. Pt deferred sitting in stretcher chair but utilized bed in full chair position. Pt could benefit from inpt rehab at discharge. Progression toward goals:  [x]    Improving appropriately and progressing toward goals  []    Improving slowly and progressing toward goals  []    Not making progress toward goals and plan of care will be adjusted     PLAN:  Patient continues to benefit from skilled intervention to address the above impairments. Continue treatment per established plan of care. Discharge Recommendations:  Inpatient Rehab  Further Equipment Recommendations for Discharge:  TBD     SUBJECTIVE:   Patient stated nothing seems to be going right.     OBJECTIVE DATA SUMMARY:   Critical Behavior:  Neurologic State: Alert  Orientation Level: Oriented X4  Cognition: Follows commands  Safety/Judgement: Awareness of environment  Functional Mobility Training:  Bed Mobility:     Supine to Sit:  (used bed in chair position due to protecting abdominal pain)              Transfers:  Sit to Stand: Contact guard assistance  Stand to Sit: Contact guard assistance                             Balance:  Sitting: Intact  Standing: Impaired  Standing - Static: Fair  Standing - Dynamic : Fair  Ambulation/Gait Training:  Distance (ft): 100 Feet (ft)  Assistive Device: Walker, rolling  Ambulation - Level of Assistance: Contact guard assistance        Gait Abnormalities: Decreased step clearance;Trunk sway increased        Base of Support: Widened     Speed/Liz: Slow  Step Length: Left shortened;Right shortened                   Stairs:              Neuro Re-Education:    Therapeutic Exercises:     Pain:  Pain Scale 1: Numeric (0 - 10)  Pain Intensity 1: 5  Pain Location 1: Abdomen  Pain Orientation 1: Anterior  Pain Description 1: Aching  Pain Intervention(s) 1: Medication (see MAR)  Activity Tolerance:   limited  Please refer to the flowsheet for vital signs taken during this treatment.   After treatment:   []    Patient left in no apparent distress sitting up in chair  [x]    Patient left in no apparent distress in bed  [x]    Call bell left within reach  [x]    Nursing notified  []    Caregiver present  []    Bed alarm activated    COMMUNICATION/COLLABORATION:   The patients plan of care was discussed with: Registered Nurse    Janis Dela Curz PTA   Time Calculation: 20 mins

## 2018-05-08 NOTE — PROGRESS NOTES
No complaints this AM.  Tm 99.3 HR: 110 BP: 101/55 Resp Rate: 16 94% sat on room air. Intake/Output Summary (Last 24 hours) at 05/08/18 0955  Last data filed at 05/08/18 0851   Gross per 24 hour   Intake             4170 ml   Output             5450 ml   Net            -1280 ml   Abd: Soft. Open area of wound granulating. More exposed small bowel with peristalsis and enteric drainage. Recent Results (from the past 24 hour(s))   MAGNESIUM    Collection Time: 05/08/18  4:06 AM   Result Value Ref Range    Magnesium 1.1 (L) 1.6 - 2.4 mg/dL   METABOLIC PANEL, BASIC    Collection Time: 05/08/18  4:06 AM   Result Value Ref Range    Sodium 129 (L) 136 - 145 mmol/L    Potassium 3.2 (L) 3.5 - 5.1 mmol/L    Chloride 83 (L) 97 - 108 mmol/L    CO2 37 (H) 21 - 32 mmol/L    Anion gap 9 5 - 15 mmol/L    Glucose 86 65 - 100 mg/dL    BUN 12 6 - 20 MG/DL    Creatinine 1.63 (H) 0.55 - 1.02 MG/DL    BUN/Creatinine ratio 7 (L) 12 - 20      GFR est AA 42 (L) >60 ml/min/1.73m2    GFR est non-AA 35 (L) >60 ml/min/1.73m2    Calcium 8.5 8.5 - 10.1 MG/DL   PHOSPHORUS    Collection Time: 05/08/18  4:06 AM   Result Value Ref Range    Phosphorus 3.5 2.6 - 4.7 MG/DL   CBC W/O DIFF    Collection Time: 05/08/18  4:06 AM   Result Value Ref Range    WBC 13.8 (H) 3.6 - 11.0 K/uL    RBC 3.24 (L) 3.80 - 5.20 M/uL    HGB 9.4 (L) 11.5 - 16.0 g/dL    HCT 29.2 (L) 35.0 - 47.0 %    MCV 90.1 80.0 - 99.0 FL    MCH 29.0 26.0 - 34.0 PG    MCHC 32.2 30.0 - 36.5 g/dL    RDW 16.8 (H) 11.5 - 14.5 %    PLATELET 032 896 - 621 K/uL    MPV 9.2 8.9 - 12.9 FL    NRBC 0.0 0  WBC    ABSOLUTE NRBC 0.00 0.00 - 0.01 K/uL   Will resume TPN and lipids. Replace K 3.2 and Mg 1.1. Diet and supplements as tolerated. Will decrease IVF to keep total fluids at 125 cc/hour. Continue PT and OT. Labs in AM.   Appreciate wound care nurses help. Palliative Care following. Discharge planning.

## 2018-05-08 NOTE — PROGRESS NOTES
Palliative Medicine    Stopped to see pt for supportive visit, pt sleeping- allowed to rest. Following intermittently, call with concerns.

## 2018-05-08 NOTE — WOUND CARE
Seen today for leaking VAC with large amount of effluent having escaped under the sponge and out of the sides. All of dressing removed, skin cleaned, and new large fistula management pouch applied (special order) using paste, Hawk's rings, marathon, and powder. Template left in room. Will continue to follow.    Gisell Bradley, BRYANN

## 2018-05-08 NOTE — PROGRESS NOTES
Occupational Therapy: First attempt: patient concerned about whether her new abdominal wound suction is working correctly. Patient requested that nursing check first. Rang for nurse. Aborted. Second attempt: Received patient in full chair position in bed, reporting she had walked with PT , but now stating she was nauseous. She drank some soda, nurse arrived, and she requested to lie down, despite encouragement to work on her UE exercises. Patient placed in Trendelenburg and she boosted herself up to top of the bed. Patient tearful and stating \"I can't help it\". Reassured. Aborted session.   Conception GERMAN Oconnor/SOCORRO

## 2018-05-08 NOTE — PROGRESS NOTES
Spoke with Elbert Barbosa, RN liaison, (R#[phone]) to explain that the goal, per the attending MD, is to get the patient on nocturnal TPN rather than continuous TPN. [facility] is conferring with their medical director to determine if they can accept the patient on TPN. They have not restarted the authorization process yet. Care Management will continue to follow her disposition.    ROBERT Mcneill

## 2018-05-08 NOTE — PROGRESS NOTES
NUTRITION       Brief note. Chart reviewed. Pt in good spirits, but admits that her drain has been leaking and she's waiting for help. Provided samples of Banatrol Plus with Bimuno Prebiotic (Lot: [id number], expires July 2018) for pt to try. This product is for pts with diarrhea and loose stools. Pt is agreeable. Discussed ways to administer (mixing with pudding, yogurt, liquid supplements, etc). Will monitor acceptance and tolerance. Output yesterday in I/O's flowsheet indicates 4300 mL out; however, no output was recorded prior to 7pm, so suspect this is not her total output for the day yesterday. Oral fluid intake 3600 mL. Suspect she will require resumption of TPN. Please see note from 5/7 for TPN recommendations.     [address]

## 2018-05-09 LAB
ANION GAP SERPL CALC-SCNC: 10 MMOL/L (ref 5–15)
BUN SERPL-MCNC: 14 MG/DL (ref 6–20)
BUN/CREAT SERPL: 9 (ref 12–20)
CALCIUM SERPL-MCNC: 8.3 MG/DL (ref 8.5–10.1)
CHLORIDE SERPL-SCNC: 86 MMOL/L (ref 97–108)
CO2 SERPL-SCNC: 35 MMOL/L (ref 21–32)
CREAT SERPL-MCNC: 1.61 MG/DL (ref 0.55–1.02)
GLUCOSE SERPL-MCNC: 152 MG/DL (ref 65–100)
MAGNESIUM SERPL-MCNC: 1.5 MG/DL (ref 1.6–2.4)
PHOSPHATE SERPL-MCNC: 3.3 MG/DL (ref 2.6–4.7)
POTASSIUM SERPL-SCNC: 3.2 MMOL/L (ref 3.5–5.1)
SODIUM SERPL-SCNC: 131 MMOL/L (ref 136–145)

## 2018-05-09 PROCEDURE — 97110 THERAPEUTIC EXERCISES: CPT

## 2018-05-09 PROCEDURE — 83735 ASSAY OF MAGNESIUM: CPT | Performed by: SURGERY

## 2018-05-09 PROCEDURE — 65270000029 HC RM PRIVATE

## 2018-05-09 PROCEDURE — 74011250636 HC RX REV CODE- 250/636: Performed by: NURSE PRACTITIONER

## 2018-05-09 PROCEDURE — 74011000258 HC RX REV CODE- 258: Performed by: SURGERY

## 2018-05-09 PROCEDURE — 97116 GAIT TRAINING THERAPY: CPT

## 2018-05-09 PROCEDURE — 74011250636 HC RX REV CODE- 250/636: Performed by: SURGERY

## 2018-05-09 PROCEDURE — 74011250637 HC RX REV CODE- 250/637: Performed by: INTERNAL MEDICINE

## 2018-05-09 PROCEDURE — 74011250637 HC RX REV CODE- 250/637: Performed by: SURGERY

## 2018-05-09 PROCEDURE — 80048 BASIC METABOLIC PNL TOTAL CA: CPT | Performed by: SURGERY

## 2018-05-09 PROCEDURE — 97530 THERAPEUTIC ACTIVITIES: CPT

## 2018-05-09 PROCEDURE — 74011250637 HC RX REV CODE- 250/637: Performed by: NURSE PRACTITIONER

## 2018-05-09 PROCEDURE — 74011000250 HC RX REV CODE- 250: Performed by: SURGERY

## 2018-05-09 PROCEDURE — 77030010520

## 2018-05-09 PROCEDURE — 74011250637 HC RX REV CODE- 250/637: Performed by: PHYSICIAN ASSISTANT

## 2018-05-09 PROCEDURE — 74011250637 HC RX REV CODE- 250/637: Performed by: PHYSICAL MEDICINE & REHABILITATION

## 2018-05-09 PROCEDURE — 84100 ASSAY OF PHOSPHORUS: CPT | Performed by: SURGERY

## 2018-05-09 PROCEDURE — 36415 COLL VENOUS BLD VENIPUNCTURE: CPT | Performed by: SURGERY

## 2018-05-09 RX ORDER — POTASSIUM CHLORIDE 14.9 MG/ML
10 INJECTION INTRAVENOUS
Status: COMPLETED | OUTPATIENT
Start: 2018-05-09 | End: 2018-05-16

## 2018-05-09 RX ORDER — MAGNESIUM SULFATE 1 G/100ML
1 INJECTION INTRAVENOUS
Status: COMPLETED | OUTPATIENT
Start: 2018-05-09 | End: 2018-05-16

## 2018-05-09 RX ADMIN — Medication 10 ML: at 09:00

## 2018-05-09 RX ADMIN — POTASSIUM CHLORIDE 10 MEQ: 200 INJECTION, SOLUTION INTRAVENOUS at 08:56

## 2018-05-09 RX ADMIN — POTASSIUM CHLORIDE 10 MEQ: 200 INJECTION, SOLUTION INTRAVENOUS at 11:16

## 2018-05-09 RX ADMIN — Medication 10 ML: at 08:53

## 2018-05-09 RX ADMIN — Medication 10 ML: at 21:41

## 2018-05-09 RX ADMIN — SODIUM CHLORIDE, SODIUM LACTATE, POTASSIUM CHLORIDE, AND CALCIUM CHLORIDE 62 ML/HR: 600; 310; 30; 20 INJECTION, SOLUTION INTRAVENOUS at 03:50

## 2018-05-09 RX ADMIN — MORPHINE SULFATE 45 MG: 15 TABLET ORAL at 11:16

## 2018-05-09 RX ADMIN — MORPHINE SULFATE 45 MG: 15 TABLET ORAL at 14:24

## 2018-05-09 RX ADMIN — POTASSIUM CHLORIDE 10 MEQ: 200 INJECTION, SOLUTION INTRAVENOUS at 11:59

## 2018-05-09 RX ADMIN — POTASSIUM CHLORIDE 40 MEQ: 750 TABLET, FILM COATED, EXTENDED RELEASE ORAL at 18:20

## 2018-05-09 RX ADMIN — MAGNESIUM SULFATE HEPTAHYDRATE 1 G: 1 INJECTION, SOLUTION INTRAVENOUS at 09:53

## 2018-05-09 RX ADMIN — MORPHINE SULFATE 45 MG: 15 TABLET ORAL at 03:00

## 2018-05-09 RX ADMIN — POTASSIUM CHLORIDE 40 MEQ: 750 TABLET, FILM COATED, EXTENDED RELEASE ORAL at 08:54

## 2018-05-09 RX ADMIN — LORAZEPAM 1 MG: 2 INJECTION INTRAMUSCULAR; INTRAVENOUS at 14:24

## 2018-05-09 RX ADMIN — DRONABINOL 2.5 MG: 2.5 CAPSULE ORAL at 08:53

## 2018-05-09 RX ADMIN — I.V. FAT EMULSION 250 ML: 20 EMULSION INTRAVENOUS at 18:21

## 2018-05-09 RX ADMIN — MORPHINE SULFATE 30 MG: 15 TABLET ORAL at 12:00

## 2018-05-09 RX ADMIN — MORPHINE SULFATE 115 MG: 15 TABLET, EXTENDED RELEASE ORAL at 22:48

## 2018-05-09 RX ADMIN — MORPHINE SULFATE 45 MG: 15 TABLET ORAL at 21:40

## 2018-05-09 RX ADMIN — DRONABINOL 2.5 MG: 2.5 CAPSULE ORAL at 18:20

## 2018-05-09 RX ADMIN — MAGNESIUM SULFATE HEPTAHYDRATE 1 G: 1 INJECTION, SOLUTION INTRAVENOUS at 10:33

## 2018-05-09 RX ADMIN — ENOXAPARIN SODIUM 40 MG: 100 INJECTION SUBCUTANEOUS at 09:00

## 2018-05-09 RX ADMIN — MORPHINE SULFATE 45 MG: 15 TABLET ORAL at 06:59

## 2018-05-09 RX ADMIN — FAMOTIDINE: 10 INJECTION, SOLUTION INTRAVENOUS at 18:21

## 2018-05-09 RX ADMIN — Medication 10 ML: at 22:00

## 2018-05-09 RX ADMIN — MORPHINE SULFATE 100 MG: 100 TABLET, FILM COATED, EXTENDED RELEASE ORAL at 07:00

## 2018-05-09 RX ADMIN — ASPIRIN 325 MG: 325 TABLET ORAL at 08:53

## 2018-05-09 RX ADMIN — MORPHINE SULFATE 100 MG: 100 TABLET, FILM COATED, EXTENDED RELEASE ORAL at 15:41

## 2018-05-09 NOTE — PROGRESS NOTES
Bedside and Verbal shift change report given to Aarti Hickey (oncoming nurse) by Munira Oconnor (offgoing nurse). Report included the following information SBAR, Kardex, MAR and Recent Results.

## 2018-05-09 NOTE — PROGRESS NOTES
Occupational Therapy: defer    Chart reviewed, attempted occupational therapy treatment session. Patient declined participating in any activity, concerned for pouch leaking. RN aware. Patient is highly motivated and frustrated with leak. Patient reports performing BUE exercises earlier today and also ambulated with PT. Will defer at this time and will f/u as able and appropriate.     Montserrat Osorio, OTR/L

## 2018-05-09 NOTE — PROGRESS NOTES
Wound care RN called multiple times during shift. Abd pouch leaking all shift- secured to the best ability- draining to gravity. Pt emotional at times- reassured. Patient is  A&Ox4. AVSS, NAD noted at this time. Full assessment into epic. Patient updated on current POC- verbalized understanding. All safety precautions in place- safety maintained. Will continue to monitor.

## 2018-05-09 NOTE — PROGRESS NOTES
Reviewed medical chart; [facility], Lulú Dickerson, RN liaison for [address] [place] [address], (T#[phone]). She explained that their MD, Dr. Angeles Marcos reviewed the patient's case and they cannot accept her on TPN. Called Dr. Ines Gutierrez office to get this message to him. Care Management will continue to follow her disposition.    ROBERT Shafer

## 2018-05-09 NOTE — PROGRESS NOTES
Problem: Mobility Impaired (Adult and Pediatric)  Goal: *Acute Goals and Plan of Care (Insert Text)  Physical Therapy Goals  Goals reassessed 5/4/18, updated and remain appropriate over next 7 days  Continue progress towards current goals 4/26/18  Update 4/18/2018  1. Patient will move from supine to sit and sit to supine  in bed with minimal assistance  within 7 day(s). MET on 5/4/18-Updated goal:  Patient will move from supine to sit and sit to supine  in bed with supervision within 7 day(s). 2.  Patient will transfer from bed to chair and chair to bed with minimal assistance  using the least restrictive device within 7 day(s). MET on 5/4/18-Updated goal:  Patient will transfer from bed to chair and chair to bed with supervision using the least restrictive device within 7 day(s). 3.  Patient will perform sit to stand with SBA within 7 days  4. Patient will ambulate 150' with SBA x 1 using LRAD within 7 days. Update 4/6/2018  1. Patient will move from supine to sit and sit to supine  in bed with moderate assistance  within 7 day(s). 2.  Patient will transfer from bed to chair and chair to bed with moderate assistance  using the least restrictive device within 7 day(s). 3.  Patient will perform sit to stand with CGA within 7 days  4. Patient will ambulate 22' with moderate assist x 1 using LRAD within 7 days. Revised 3/27/2018  1. Patient will move from supine to sit and sit to supine  in bed with moderate assistance  within 7 day(s). 2.  Patient will transfer from bed to chair and chair to bed with moderate assistance  using the least restrictive device within 7 day(s). 3.  Patient will perform sit to stand with moderate assistance  within 7 day(s). 4.  Patient will ambulate 100' with moderate assist x1 using the least restrictive device within 7 day(s). Revisited 3/19/2018, goals remain appropriate, carry over    Physical Therapy Goals  Initiated 3/8/2018  1.   Patient will move from supine to sit and sit to supine  in bed with moderate assistance  within 7 day(s). 2.  Patient will transfer from bed to chair and chair to bed with moderate assistance  using the least restrictive device within 7 day(s). 3.  Patient will perform sit to stand with moderate assistance  within 7 day(s). 4.  PT will assess gait with the least restrictive device within 7 day(s). physical Therapy TREATMENT  Patient: Rd Lemus (38 y.o. female)  Date: 5/9/2018  Diagnosis: FISTULA  Small bowel fistula  INTEROCUTANOUS FISTULA <principal problem not specified>  Procedure(s) (LRB):  OPEN JEJUNOSTOMY TUBE PLACEMENT UNDER FLURO/ WOUND VAC CHANGE. (N/A) 27 Days Post-Op  Precautions: Fall, Skin, Contact  Chart, physical therapy assessment, plan of care and goals were reviewed. ASSESSMENT:  Pt was agreeabe to treatment. Pt expressing tolerable pain. Pt requested to start with the rolling walker and then progress to no A.D. During gait pt's drain began to drain. Pt returned to room in seated position. Wound care nurse was notified. Pt was able to demonstrate seated exercises. Will continue to progress pt off A.D. Pt could benefit from inpt rehab at discharge. Progression toward goals:  [x]    Improving appropriately and progressing toward goals  []    Improving slowly and progressing toward goals  []    Not making progress toward goals and plan of care will be adjusted     PLAN:  Patient continues to benefit from skilled intervention to address the above impairments. Continue treatment per established plan of care. Discharge Recommendations:  Inpatient Rehab  Further Equipment Recommendations for Discharge:  TBD     SUBJECTIVE:   Patient stated I think I am leaking.     OBJECTIVE DATA SUMMARY:   Critical Behavior:  Neurologic State: Alert  Orientation Level: Oriented X4  Cognition: Appropriate decision making, Appropriate for age attention/concentration, Appropriate safety awareness, Follows commands  Safety/Judgement: Awareness of environment  Functional Mobility Training:  Bed Mobility:                    Transfers:  Sit to Stand: Contact guard assistance  Stand to Sit: Contact guard assistance                             Balance:  Sitting: Intact  Standing: Impaired  Standing - Static: Fair  Standing - Dynamic : Fair  Ambulation/Gait Training:  Distance (ft): 50 Feet (ft)  Assistive Device: Walker, rolling  Ambulation - Level of Assistance: Contact guard assistance        Gait Abnormalities: Decreased step clearance        Base of Support: Widened     Speed/Liz: Pace decreased (<100 feet/min)  Step Length: Left shortened;Right shortened                    Stairs:              Neuro Re-Education:    Therapeutic Exercises: Ankle pump, ABDIRASHID artis, pt also demonstrated her UE exercises   Pain:  Pain Scale 1: Numeric (0 - 10)  Pain Intensity 1: 6  Pain Location 1: Abdomen  Pain Orientation 1: Anterior  Pain Description 1: Aching  Pain Intervention(s) 1: Medication (see MAR)  Activity Tolerance:   Limited   Please refer to the flowsheet for vital signs taken during this treatment.   After treatment:   []    Patient left in no apparent distress sitting up in chair  [x]    Patient left in no apparent distress in bed  [x]    Call bell left within reach  [x]    Nursing notified  []    Caregiver present  []    Bed alarm activated    COMMUNICATION/COLLABORATION:   The patients plan of care was discussed with: Registered Nurse    Natalie Gonzalez PTA   Time Calculation: 25 mins

## 2018-05-09 NOTE — PROGRESS NOTES
No new complaints today. Tm 99.3 HR: 107 BP: 142/88 Resp Rate: 18 90% sat on room air. Intake/Output Summary (Last 24 hours) at 05/09/18 0810  Last data filed at 05/09/18 0350   Gross per 24 hour   Intake          1165.28 ml   Output             2950 ml   Net         -1784.72 ml   Exam: Cor: RRR. Lungs: Bilateral breath sounds. Clear to auscultation. Abd: Soft. No change in enterocutaneous fistula which is well controlled. Labs:   Recent Results (from the past 12 hour(s))   MAGNESIUM    Collection Time: 05/09/18  2:11 AM   Result Value Ref Range    Magnesium 1.5 (L) 1.6 - 2.4 mg/dL   METABOLIC PANEL, BASIC    Collection Time: 05/09/18  2:11 AM   Result Value Ref Range    Sodium 131 (L) 136 - 145 mmol/L    Potassium 3.2 (L) 3.5 - 5.1 mmol/L    Chloride 86 (L) 97 - 108 mmol/L    CO2 35 (H) 21 - 32 mmol/L    Anion gap 10 5 - 15 mmol/L    Glucose 152 (H) 65 - 100 mg/dL    BUN 14 6 - 20 MG/DL    Creatinine 1.61 (H) 0.55 - 1.02 MG/DL    BUN/Creatinine ratio 9 (L) 12 - 20      GFR est AA 43 (L) >60 ml/min/1.73m2    GFR est non-AA 35 (L) >60 ml/min/1.73m2    Calcium 8.3 (L) 8.5 - 10.1 MG/DL   PHOSPHORUS    Collection Time: 05/09/18  2:11 AM   Result Value Ref Range    Phosphorus 3.3 2.6 - 4.7 MG/DL   Will renew TPN and lipids. Diet/supplements as tolerated. Replace K 3.2 and Mg 1.5. Continue IVF - will make total IVF 150cc/hour. Labs in AM.   PT and OT following. DVT Prophylaxis - Lovenox. Pain management per Palliative Care.

## 2018-05-09 NOTE — PROGRESS NOTES
NUTRITION       Recommendations:  1. Continue to replete electrolytes and monitor daily    2. Consider transition to cyclic TPN  Goal: 6%AA Z98 @ 90 mL/hr x 1 hour; 155 mL/hr x 12 hours; 90 mL/hr x last hour + 20% lipids, 250 mL 3x/week  -- Check BG 2 hours into infusion and one hour after infusion is completed    New TPN orders noted. Chart reviewed, discussed with RN. TPN resumed on 5/8 for nutrition support. IVF ongoing (total TPN + IVF to total 125 mL/hr). Pt visited with [address] Appliance has been leaking secondary to food particles filling the bag and increasing pressure, which has been causing leakage. WOCN to increase the size of the suction tubing to see if this will help prevent leakage. Pt has not yet tried Banatrol, but willing to do so (thought she lost the samples, but they were in the room). Provided an additional two packets for trial.  She is drinking Gatorade (order is for G2, so will need to clarify with diet office as she has regular Gatorade at the bedside). Noted [facility] is no longer able to take the pt now that she is on TPN, even if it is adjusted to nocturnal TPN. Future TPN: 6%AA D20 @ 90 mL/hr + MVI, thiamine (100 mg), famotidine 40 mg, trace elements + 20% lipids, 250 mL 3x/week (M/W/F)  - This will provide a daily average of 2203 kcal, 130 g protein and 2160 mL (2410 mL on lipid days)    Potassium and magnesium remain depleted-- ordered in TPN. Fistula Drain Output:   5/8: 1450 mL  5/7: 4300 mL  5/6: 200 mL (?) Ileostomy: 0 mL x 3 days     PO Intake:  5/6: 360 mL  5/7: 3400 mL  5/8: 480 mL     Estimated Nutrition Needs:   Kcals/day: 2041 Kcals/day (4955-2988 kcal/day (MSJ x 1-1.1))  Protein: 111 g (111-152 g/day (0.8-1.1 kg))  Fluid: 2250 ml (or 1 mL/kcal or per output)     Based On:  Kristen Ozuna  Weight Used: Actual wt (138.6 kg (standing on 4/27))    RD to follow    Sofiya Wakefield, [address]

## 2018-05-09 NOTE — WOUND CARE
paged by RN for leaking pouch. Small leak at bottom of window but otherwise a good seal primarily. Took window apart and cleaned track thoroughly & snapped back together. Connected to wall suction. Will continue to follow. Jana Arce, [address]: return visit for leaking at window again with wall suction clogged. Connected her to respiratory tubing to gravity drainage with the hopes of less pressure on window will keep it from leaking. Also added silk tape to window to reinforce.    Jana Arce, BRYANN

## 2018-05-10 LAB
ANION GAP SERPL CALC-SCNC: 9 MMOL/L (ref 5–15)
BUN SERPL-MCNC: 18 MG/DL (ref 6–20)
BUN/CREAT SERPL: 14 (ref 12–20)
CALCIUM SERPL-MCNC: 8.5 MG/DL (ref 8.5–10.1)
CHLORIDE SERPL-SCNC: 90 MMOL/L (ref 97–108)
CO2 SERPL-SCNC: 31 MMOL/L (ref 21–32)
CREAT SERPL-MCNC: 1.26 MG/DL (ref 0.55–1.02)
GLUCOSE BLD STRIP.AUTO-MCNC: 177 MG/DL (ref 65–100)
GLUCOSE BLD STRIP.AUTO-MCNC: 222 MG/DL (ref 65–100)
GLUCOSE SERPL-MCNC: 155 MG/DL (ref 65–100)
MAGNESIUM SERPL-MCNC: 2.2 MG/DL (ref 1.6–2.4)
POTASSIUM SERPL-SCNC: 3.6 MMOL/L (ref 3.5–5.1)
SERVICE CMNT-IMP: ABNORMAL
SERVICE CMNT-IMP: ABNORMAL
SODIUM SERPL-SCNC: 130 MMOL/L (ref 136–145)

## 2018-05-10 PROCEDURE — 74011250636 HC RX REV CODE- 250/636: Performed by: NURSE PRACTITIONER

## 2018-05-10 PROCEDURE — 74011250636 HC RX REV CODE- 250/636: Performed by: SURGERY

## 2018-05-10 PROCEDURE — 77030010541

## 2018-05-10 PROCEDURE — 82962 GLUCOSE BLOOD TEST: CPT

## 2018-05-10 PROCEDURE — 74011250637 HC RX REV CODE- 250/637: Performed by: SURGERY

## 2018-05-10 PROCEDURE — 74011250637 HC RX REV CODE- 250/637: Performed by: PHYSICIAN ASSISTANT

## 2018-05-10 PROCEDURE — 80048 BASIC METABOLIC PNL TOTAL CA: CPT | Performed by: SURGERY

## 2018-05-10 PROCEDURE — 97530 THERAPEUTIC ACTIVITIES: CPT

## 2018-05-10 PROCEDURE — 77030010522

## 2018-05-10 PROCEDURE — 74011250637 HC RX REV CODE- 250/637: Performed by: INTERNAL MEDICINE

## 2018-05-10 PROCEDURE — 97110 THERAPEUTIC EXERCISES: CPT

## 2018-05-10 PROCEDURE — 77030010520

## 2018-05-10 PROCEDURE — 36415 COLL VENOUS BLD VENIPUNCTURE: CPT | Performed by: SURGERY

## 2018-05-10 PROCEDURE — 97116 GAIT TRAINING THERAPY: CPT

## 2018-05-10 PROCEDURE — 83735 ASSAY OF MAGNESIUM: CPT | Performed by: SURGERY

## 2018-05-10 PROCEDURE — 65270000029 HC RM PRIVATE

## 2018-05-10 PROCEDURE — 77030011641 HC PASTE OST ADH BMS -A

## 2018-05-10 PROCEDURE — 74011250637 HC RX REV CODE- 250/637: Performed by: NURSE PRACTITIONER

## 2018-05-10 PROCEDURE — 74011250637 HC RX REV CODE- 250/637: Performed by: PHYSICAL MEDICINE & REHABILITATION

## 2018-05-10 PROCEDURE — 74011000250 HC RX REV CODE- 250: Performed by: NURSE PRACTITIONER

## 2018-05-10 PROCEDURE — 74011000258 HC RX REV CODE- 258: Performed by: NURSE PRACTITIONER

## 2018-05-10 RX ADMIN — MORPHINE SULFATE 45 MG: 15 TABLET ORAL at 17:58

## 2018-05-10 RX ADMIN — MORPHINE SULFATE 115 MG: 15 TABLET, EXTENDED RELEASE ORAL at 22:50

## 2018-05-10 RX ADMIN — ENOXAPARIN SODIUM 40 MG: 100 INJECTION SUBCUTANEOUS at 09:55

## 2018-05-10 RX ADMIN — ASPIRIN 325 MG: 325 TABLET ORAL at 09:55

## 2018-05-10 RX ADMIN — Medication 10 ML: at 03:57

## 2018-05-10 RX ADMIN — MORPHINE SULFATE 45 MG: 15 TABLET ORAL at 14:07

## 2018-05-10 RX ADMIN — POTASSIUM CHLORIDE 40 MEQ: 750 TABLET, FILM COATED, EXTENDED RELEASE ORAL at 17:58

## 2018-05-10 RX ADMIN — FAMOTIDINE: 10 INJECTION, SOLUTION INTRAVENOUS at 19:13

## 2018-05-10 RX ADMIN — SODIUM CHLORIDE, SODIUM LACTATE, POTASSIUM CHLORIDE, AND CALCIUM CHLORIDE 62 ML/HR: 600; 310; 30; 20 INJECTION, SOLUTION INTRAVENOUS at 16:22

## 2018-05-10 RX ADMIN — MORPHINE SULFATE 45 MG: 15 TABLET ORAL at 09:56

## 2018-05-10 RX ADMIN — PROCHLORPERAZINE EDISYLATE 5 MG: 5 INJECTION INTRAMUSCULAR; INTRAVENOUS at 19:01

## 2018-05-10 RX ADMIN — MORPHINE SULFATE 45 MG: 15 TABLET ORAL at 01:41

## 2018-05-10 RX ADMIN — POTASSIUM CHLORIDE 40 MEQ: 750 TABLET, FILM COATED, EXTENDED RELEASE ORAL at 09:55

## 2018-05-10 RX ADMIN — SODIUM CHLORIDE, SODIUM LACTATE, POTASSIUM CHLORIDE, AND CALCIUM CHLORIDE 62 ML/HR: 600; 310; 30; 20 INJECTION, SOLUTION INTRAVENOUS at 01:55

## 2018-05-10 RX ADMIN — DRONABINOL 2.5 MG: 2.5 CAPSULE ORAL at 17:58

## 2018-05-10 RX ADMIN — Medication 10 ML: at 14:08

## 2018-05-10 RX ADMIN — MORPHINE SULFATE 45 MG: 15 TABLET ORAL at 21:08

## 2018-05-10 RX ADMIN — Medication 10 ML: at 22:52

## 2018-05-10 RX ADMIN — MORPHINE SULFATE 100 MG: 100 TABLET, FILM COATED, EXTENDED RELEASE ORAL at 16:13

## 2018-05-10 RX ADMIN — DRONABINOL 2.5 MG: 2.5 CAPSULE ORAL at 09:56

## 2018-05-10 RX ADMIN — MORPHINE SULFATE 100 MG: 100 TABLET, FILM COATED, EXTENDED RELEASE ORAL at 08:06

## 2018-05-10 RX ADMIN — MORPHINE SULFATE 45 MG: 15 TABLET ORAL at 06:11

## 2018-05-10 RX ADMIN — Medication 10 ML: at 14:09

## 2018-05-10 RX ADMIN — NYSTATIN: 100000 CREAM TOPICAL at 09:54

## 2018-05-10 RX ADMIN — NYSTATIN: 100000 CREAM TOPICAL at 17:57

## 2018-05-10 RX ADMIN — LORAZEPAM 1 MG: 2 INJECTION INTRAMUSCULAR; INTRAVENOUS at 03:56

## 2018-05-10 NOTE — PROGRESS NOTES
Daily Progress Note  [facility] General Surgery at [address] Date: 3/7/2018    Subjective:     Last 24 hrs: Pain well controlled. Doing better after pouch change after identifying area of leak. Continues on TPN. Tearful at times while discussing how she wants to be able to return to normal activities/having her own life. She did have some thicker stool coming out from her original ileostomy yesterday. Objective:     Blood pressure 124/58, pulse (!) 101, temperature 98.9 °F (37.2 °C), resp. rate 16, height 5' 4\" (1.626 m), weight 128 kg (282 lb 3 oz), SpO2 98 %. Temp (24hrs), Av.9 °F (37.2 °C), Min:98.6 °F (37 °C), Max:99.6 °F (37.6 °C)      _____________________  Physical Exam:     Alert and Oriented, lying in bed, no acute distress. Cardiovascular: tachy, regular  Lungs:CTAB   Abdomen: soft, NT. Pouch in place over fistula with thin brown liquid. Ileostomy with pink stoma. 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 Continue to work on disposition        Eulogio Alfonso, [address] [facility] Surgery at [address], [address] [place] [address]  [phone]    Data Review:    Recent Labs      18   0406   WBC  13.8*   HGB  9.4*   HCT  29.2*   PLT  380     Recent Labs      05/10/18   0350  18   0211  18   0406   NA  130*  131*  129*   K  3.6  3.2*  3.2*   CL  90*  86*  83*   CO2  31  35*  37*   GLU  155*  152*  86   BUN  18  14  12   CREA  1.26*  1.61*  1.63*   CA  8.5  8.3*  8.5   MG  2.2  1.5*  1.1*   PHOS   --   3.3  3.5     No results for input(s): AML, LPSE in the last 72 hours.         ______________________  Medications:    Current Facility-Administered Medications   Medication Dose Route Frequency    TPN ADULT - CENTRAL   IntraVENous TITRATE    fat emulsion 20% (LIPOSYN, INTRAlipid) infusion 250 mL  250 mL IntraVENous Q MON, WED & FRI    potassium chloride SR (KLOR-CON 10) tablet 40 mEq  40 mEq Oral BID    guaiFENesin (ROBITUSSIN) 100 mg/5 mL oral liquid 100 mg  100 mg Oral Q4H PRN    lactated Ringers infusion  62 mL/hr IntraVENous CONTINUOUS    morphine IR (MS IR) tablet 45 mg  45 mg Oral Q4H    morphine IR (MS IR) tablet 30 mg  30 mg Oral DAILY PRN    lidocaine (LIDODERM) 5 % patch 2 Patch  2 Patch TransDERmal Q24H    0.9% sodium chloride infusion 250 mL  250 mL IntraVENous PRN    morphine CR (MS CONTIN) tablet 115 mg  115 mg Oral QHS    morphine CR (MS CONTIN) tablet 100 mg  100 mg Oral Q24H    morphine CR (MS CONTIN) tablet 100 mg  100 mg Oral Q24H    aspirin (ASPIRIN) tablet 325 mg  325 mg Oral DAILY    0.9% sodium chloride infusion 250 mL  250 mL IntraVENous PRN    acetaminophen (TYLENOL) tablet 650 mg  650 mg Oral Q6H PRN    diphenhydrAMINE (BENADRYL) capsule 25 mg  25 mg Oral Q6H PRN    enoxaparin (LOVENOX) injection 40 mg  40 mg SubCUTAneous Q24H    nystatin (MYCOSTATIN) 100,000 unit/gram cream   Topical BID    prochlorperazine (COMPAZINE) with saline injection 5 mg  5 mg IntraVENous Q6H PRN    naloxone (NARCAN) injection 0.4 mg  0.4 mg IntraVENous EVERY 2 MINUTES AS NEEDED    glucose chewable tablet 16 g  4 Tab Oral PRN    dextrose (D50W) injection syrg 12.5-25 g  12.5-25 g IntraVENous PRN    glucagon (GLUCAGEN) injection 1 mg  1 mg IntraMUSCular PRN    scopolamine (TRANSDERM-SCOP) 1 mg over 3 days 1 Patch  1 Patch TransDERmal Q72H    sodium chloride (NS) flush 10 mL  10 mL InterCATHeter Q24H    sodium chloride (NS) flush 10 mL  10 mL InterCATHeter Q8H    alteplase (CATHFLO) 1 mg in sterile water (preservative free) 1 mL injection  1 mg InterCATHeter PRN    bacitracin 500 unit/gram packet 1 Packet  1 Packet Topical PRN    sodium chloride (NS) flush 20 mL  20 mL InterCATHeter PRN    sodium chloride (NS) flush 10 mL  10 mL InterCATHeter Q24H    sodium chloride (NS) flush 10 mL  10 mL InterCATHeter PRN    sodium chloride (NS) flush 10 mL  10 mL InterCATHeter Q8H    dronabinol (MARINOL) capsule 2.5 mg  2.5 mg Oral BID    phenol throat spray (CHLORASEPTIC) 1 Spray  1 Spray Oral PRN    ondansetron (ZOFRAN) injection 4 mg  4 mg IntraVENous Q4H PRN    promethazine (PHENERGAN) 12.5 mg in 0.9% sodium chloride 50 mL IVPB  12.5 mg IntraVENous Q6H PRN    LORazepam (ATIVAN) injection 1 mg  1 mg IntraVENous Q6H PRN    albuterol (PROVENTIL VENTOLIN) nebulizer solution 2.5 mg  2.5 mg Nebulization Q4H PRN

## 2018-05-10 NOTE — PROGRESS NOTES
Problem: Mobility Impaired (Adult and Pediatric)  Goal: *Acute Goals and Plan of Care (Insert Text)  Physical Therapy Goals  Goals reassessed 5/4/18, updated and remain appropriate over next 7 days  Continue progress towards current goals 4/26/18  Update 4/18/2018  1. Patient will move from supine to sit and sit to supine  in bed with minimal assistance  within 7 day(s). MET on 5/4/18-Updated goal:  Patient will move from supine to sit and sit to supine  in bed with supervision within 7 day(s). 2.  Patient will transfer from bed to chair and chair to bed with minimal assistance  using the least restrictive device within 7 day(s). MET on 5/4/18-Updated goal:  Patient will transfer from bed to chair and chair to bed with supervision using the least restrictive device within 7 day(s). 3.  Patient will perform sit to stand with SBA within 7 days  4. Patient will ambulate 150' with SBA x 1 using LRAD within 7 days. Update 4/6/2018  1. Patient will move from supine to sit and sit to supine  in bed with moderate assistance  within 7 day(s). 2.  Patient will transfer from bed to chair and chair to bed with moderate assistance  using the least restrictive device within 7 day(s). 3.  Patient will perform sit to stand with CGA within 7 days  4. Patient will ambulate 22' with moderate assist x 1 using LRAD within 7 days. Revised 3/27/2018  1. Patient will move from supine to sit and sit to supine  in bed with moderate assistance  within 7 day(s). 2.  Patient will transfer from bed to chair and chair to bed with moderate assistance  using the least restrictive device within 7 day(s). 3.  Patient will perform sit to stand with moderate assistance  within 7 day(s). 4.  Patient will ambulate 100' with moderate assist x1 using the least restrictive device within 7 day(s). Revisited 3/19/2018, goals remain appropriate, carry over    Physical Therapy Goals  Initiated 3/8/2018  1.   Patient will move from supine to sit and sit to supine  in bed with moderate assistance  within 7 day(s). 2.  Patient will transfer from bed to chair and chair to bed with moderate assistance  using the least restrictive device within 7 day(s). 3.  Patient will perform sit to stand with moderate assistance  within 7 day(s). 4.  PT will assess gait with the least restrictive device within 7 day(s). physical Therapy TREATMENT  Patient: Rudy Cristina (38 y.o. female)  Date: 5/10/2018  Diagnosis: FISTULA  Small bowel fistula  INTEROCUTANOUS FISTULA <principal problem not specified>  Procedure(s) (LRB):  OPEN JEJUNOSTOMY TUBE PLACEMENT UNDER FLURO/ WOUND VAC CHANGE. (N/A) 28 Days Post-Op  Precautions: Fall, Skin, Contact  Chart, physical therapy assessment, plan of care and goals were reviewed. ASSESSMENT:  Pt was agreeable to therapy. Pt needed to empty pouch prior to gait. Pt was able to perform task with setup. Pt expressed concerns that the pouch will leak. Placed bed in chair position to prevent disturbing pouch. Pt was able to increase gait tolerance utilizing rolling walker for safety. Pt reports feeling nauseated and dizzy. Pt may benefit from rehab at discharge. Progression toward goals:  [x]    Improving appropriately and progressing toward goals  []    Improving slowly and progressing toward goals  []    Not making progress toward goals and plan of care will be adjusted     PLAN:  Patient continues to benefit from skilled intervention to address the above impairments. Continue treatment per established plan of care. Discharge Recommendations:  Rehab  Further Equipment Recommendations for Discharge:  TBD     SUBJECTIVE:   Patient stated I hate this.  emptying bag    OBJECTIVE DATA SUMMARY:   Critical Behavior:  Neurologic State: Alert, Appropriate for age  Orientation Level: Oriented X4  Cognition: Appropriate decision making, Appropriate for age attention/concentration, Appropriate safety awareness, Follows commands  Safety/Judgement: Awareness of environment  Functional Mobility Training:  Bed Mobility:            utilized bed in chair position         Transfers:  Sit to Stand: Stand-by assistance  Stand to Sit: Stand-by assistance                             Balance:  Sitting: Intact  Standing: Impaired  Standing - Static: Good  Standing - Dynamic : Fair  Ambulation/Gait Training:  Distance (ft): 130 Feet (ft)  Assistive Device: Walker, rolling  Ambulation - Level of Assistance: Stand-by assistance;Contact guard assistance        Gait Abnormalities: Decreased step clearance        Base of Support: Widened     Speed/Liz: Pace decreased (<100 feet/min)  Step Length: Left shortened;Right shortened                    Stairs:              Neuro Re-Education:    Therapeutic Exercises:     Pain:  Pain Scale 1: Numeric (0 - 10)  Pain Intensity 1: 6           Pain Intervention(s) 1: Medication (see MAR)  Activity Tolerance:   limited  Please refer to the flowsheet for vital signs taken during this treatment.   After treatment:   []    Patient left in no apparent distress sitting up in chair  [x]    Patient left in no apparent distress in bed  [x]    Call bell left within reach  [x]    Nursing notified  []    Caregiver present  []    Bed alarm activated    COMMUNICATION/COLLABORATION:   The patients plan of care was discussed with: Registered Nurse    Jalil Martínez PTA   Time Calculation: 21 mins

## 2018-05-10 NOTE — PROGRESS NOTES
Bedside shift change report given to Mirella Resendez RN (oncoming nurse) by Kurtis Aly RN (offgoing nurse). Report included the following information SBAR, Intake/Output and MAR.

## 2018-05-10 NOTE — PROGRESS NOTES
Reviewed medical chart; as noted yesterday, spoke with [facility] liaison, Gabby Chu RN liaison for [employer], ([id number]#[phone]). She explained that their MD, Dr. Tita Romero reviewed the patient's case and they cannot accept her on TPN. Reiterated this to the surgery NP today. Once the patient is off of TPN, [facility] will need to be notified in order for them to start the insurance authorization process again. Care Management will continue to follow her disposition.    ROBERT Lemus

## 2018-05-10 NOTE — ROUTINE PROCESS
Pt's fistula wound has been leaking and RN has reinforced multiple times. The bag to gravity has failed and clogged off. RN has had to suction contents multiple times from wound bag.

## 2018-05-10 NOTE — PROGRESS NOTES
Problem: Self Care Deficits Care Plan (Adult)  Goal: *Acute Goals and Plan of Care (Insert Text)  Occupational Therapy Goals:  Goals reviewed and continued: 5/4/18  Revised 4/27/18  1. Patient will complete bathing upper body to knees with setup within 7 days upgraded 4/27  2. Patient will complete bariatric BSC transfer with supervision bariatric RW within 7 days. Upgraded 4/27  3. Patient will complete lower body dressing activities with supervision within 7 days using AE  4. Patient will perform standing for clothing management tasks (toileting or dressing) supervision bariatric RW within 7 days. Upgraded 4/27  5. Patient will complete light resistance band UE exercise program independently within 7 days. Upgraded 4/27/18    Reevaluated 4/20/2018  1. Patient will complete grooming activity sitting EOB without support for 10 minutes within 7 days  2. Patient will complete BSC transfer with min A x 1 persons within 7 days. 3. Patient will complete lower body dressing activities with supervision within 7 days using AE  4. Patient will perform standing for clothing management tasks (toileting or dressing) with min A x 1 persons within 7 days. 5. Patient will complete UE exercise program independently within 7 days. Reevaluated 4/10/2018  1. Patient will complete grooming activity sitting EOB without support for 10 minutes within 7 days. 2.  Patient will complete BSC transfer with min A x 2 persons within 7 days. 3.  Patient will complete lower body dressing activities with min A within 7 days. 4.  Patient will perform standing for clothing management tasks (toileting or dressing) with min A x 2 persons within 7 days. 5.  Patient will complete UE exercise program independently within 7 days. Initiated 4/4/2018  1. Patient will perform rolling and pulling self up to head of bed with min assist to assist with ADL's at bed level within 7 day(s).   2.  Patient will perform static sitting edge of bed > or = 10 minutes with supervision/set-up within 7 day(s). 3.  Patient will perform static standing with bilateral UE support on rolling walker > or = 3 minutes with minimal assistance/contact guard assist x's 2 within 7 day(s). 4.  Patient will perform toilet transfers with moderate assistance x's 2 to bedside commode within 7 days. 5.  Patient will perform bilateral UE AROM and strengthening exercises throughout day and grade exercises prn to increase demand within 7 day(s). Occupational Therapy TREATMENT  Patient: Kymberly Bolaños (38 y.o. female)  Date: 5/10/2018  Diagnosis: FISTULA  Small bowel fistula  INTEROCUTANOUS FISTULA <principal problem not specified>  Procedure(s) (LRB):  OPEN JEJUNOSTOMY TUBE PLACEMENT UNDER FLURO/ WOUND VAC CHANGE. (N/A) 28 Days Post-Op  Precautions: Fall, Skin, Contact  Chart, occupational therapy assessment, plan of care, and goals were reviewed. ASSESSMENT:  Patient is limited by impaired activity tolerance, impaired endurance, and impaired functional reach to LB, resulting in impaired functional mobility and impaired ADL independence. Patient received supine in bed, and emptied jejunostomy bag with set-up in preparation for session. Patient became tearful when realizing the bag is leaking again and required max encouragement to continue participating in session. Performed supine-sit with min-A and additional time with HOB raised using rails. Patient declined attempting ADLs or BSC transfer at this time. Patient performed BUE therapeutic exercises sitting EOB with theraband to promote UB strengthening required for mobility and self-care. Patient performed 5-10 reps of following: anterior elbow extension, lateral elbow extension, and overhead elbow extension. Patient performed sit-supine with CGA, but reports dizziness after transfer, /64 supine post-activity with dizziness resolved. RN notified of leak.   Will continue to progress standing ADLs and exercises OOB as tolerated, continue to recommend IP rehab as patient is motivated and below functional baseline. Progression toward goals:  []       Improving appropriately and progressing toward goals  [x]       Improving slowly and progressing toward goals  []       Not making progress toward goals and plan of care will be adjusted     PLAN:  Patient continues to benefit from skilled intervention to address the above impairments. Continue treatment per established plan of care. Discharge Recommendations:  Inpatient Rehab  Further Equipment Recommendations for Discharge:  TBD     SUBJECTIVE:   Patient stated I'm so frustrated that it's leaking again.     OBJECTIVE DATA SUMMARY:   Cognitive/Behavioral Status:  Neurologic State: Alert  Orientation Level: Oriented X4  Cognition: Appropriate decision making; Appropriate for age attention/concentration; Appropriate safety awareness; Follows commands  Perception: Appears intact  Perseveration: No perseveration noted  Safety/Judgement: Awareness of environment; Insight into deficits    Functional Mobility and Transfers for ADLs:  Bed Mobility:  Supine to Sit: Minimum assistance  Sit to Supine: Contact guard assistance      Balance:  Sitting: Intact  Standing: Impaired  Standing - Static: Good  Standing - Dynamic : Fair    ADL Intervention:            Cognitive Retraining  Safety/Judgement: Awareness of environment; Insight into deficits     Therapeutic Exercises:    Patient performed BUE therapeutic exercises sitting EOB with theraband to promote UB strengthening required for mobility and self-care. Patient performed 5-10 reps of following: anterior elbow extension, lateral elbow extension, and overhead elbow extension.    Pain:  Pain Scale 1: Numeric (0 - 10)  Pain Intensity 1: 6           Pain Intervention(s) 1: Medication (see MAR) Nursing managing  Activity Tolerance:   Vitals:    05/10/18 1541   BP: 108/64   BP 1 Location: Left arm   BP Patient Position: Post activity;Supine   Pulse: (!) 102     Please refer to the flowsheet for vital signs taken during this treatment.   After treatment:   [] Patient left in no apparent distress sitting up in chair  [x] Patient left in no apparent distress in bed  [x] Call bell left within reach  [x] Nursing notified  [] Caregiver present  [] Bed alarm activated    COMMUNICATION/COLLABORATION:   The patients plan of care was discussed with: Registered Nurse    Waldemar Chambers OT  Time Calculation: 28 mins

## 2018-05-10 NOTE — PROGRESS NOTES
NUTRITION COMPLETE ASSESSMENT    RECOMMENDATIONS:   1. Agree with cyclic TPN    2. Need strict I/O documentation of all po intake-- liquid volume, meal and supplement intake percentages-- to best assess her drain output    3. Standing scale weight (last standing weight is from 4/27). Pt is aware and should request while up with PT.    4. Would pt benefit from care conference with surgeons/team to help discuss and/or determine long term plan (including whether she will likely need long term TPN)? The pt often feels like she does not have answers and worries there is no resolution to her situation. Interventions/Plan:   Food/Nutrient Delivery: (Vital 1.5; Boost Glucose Control supplements)    Assessment:   Reason for Assessment:   [x]Reassessment     Diet:   GI Lite + Vital 1.5 BID + Boost Glucose Control BID  Nutritionally Significant Medications: [x] Reviewed & Includes: marinol twice/day (oral); LR @ 62 mL/hr; potassium chloride (40 mEq); phenergan q6 hours prn; scopolamine q72 hours  Future TPN:   6%AA D20 @ 90 mL/hr x 1 hour   @ 155 mL/hr x 12 hours   @ 90 mL/hr x 1 hour  + 20% lipids, 250 mL 3x/week  + MVI, famotidine 40 mg  Meal Intake: Patient Vitals for the past 100 hrs:   % Diet Eaten   05/09/18 1200 100 %   05/08/18 1919 100 %   05/08/18 1130 100 %   05/07/18 2332 100 %   05/07/18 1317 80 %   05/07/18 1018 100 %     Subjective:  Pt tearful and wishes she could go to [facility]. Discussed pt's limited absorption with RN in the room. The pt is making an effort to eat and drink all meals and supplements; however, output remains elevated. She has yet to try Banatrol. She needs to try tonight. Nursing voices concerns about medicine (po) absorption as well and agree this is likely an issue. Objective:  Chart reviewed, discussed with RN. Pt remains on TPN as her primary source of nutrition secondary to the likelihood of limited absorption of po.     Output so far today is 4400 mL   Only po documentation so far today is 480 mL. Pt states she has had a Vital 1.5 supplement and Boost Glucose Control supplement. She is also drinking G2 Gatorade and iced tea with equal.  Reinforced limiting sugar-sweetened beverages. Cyclic TPN (as above) to provide a daily average of 2091 kcal, 122 g protein and 2040 mL-- meeting 100% of estimated needs. Discussed with the pt that there is a chance she may require long term TPN secondary to questionable absorption and high output from her drain. While this is a fear of her's, it seems as though this could be the reality. If she is unable to maintain with po alone (most days, at least 3-4L of GI contents and food is exiting via her abdominal drain). No new standing weight to assess since 4/27. Estimated Nutrition Needs:   Kcals/day: 2041 Kcals/day (1167-3225 kcal/day (MSJ x 1-1.1))  Protein: 111 g (111-152 g/day (0.8-1.1 kg))  Fluid: 2250 ml (or 1 mL/kcal or per output)     Based On: [email or web address]  Weight Used: Actual wt (138.6 kg (standing on 4/27))    Pt expected to meet estimated nutrient needs:  [x]   Yes  (via TPN)    Nutrition Diagnosis:   1. 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  Less than optimal enteral nutrition related to slow tube feeding progression as evidenced by resolved (no longer on tube feedings)    Goals:     Pt to meet at least 90% of estimated needs via TPN over the next 5-7 days     Monitoring & Evaluation:    - Liquid meal replacement, Total energy intake   - Weight/weight change      Previous Nutrition Goals Met:  Progressing  Previous Recommendations:      Yes    Education & Discharge Needs:   [] None Identified   [x] Identified and addressed    [x] Participated in care plan, discharge planning, and/or interdisciplinary rounds        Cultural, Gnosticist and ethnic food preferences identified:  NONE      Skin Integrity: []Intact  [x]Other: see wound care doc  Edema: []None [x]Other: 1+  Last BM: 5/9/18  Food Allergies: [x]None []Other    Anthropometrics:    Weight Loss Metrics 5/9/2018 3/7/2018 3/5/2018 2/28/2018 2/27/2018 2/27/2018 2/26/2018   Today's Wt 282 lb 3 oz - 304 lb 3.2 oz 306 lb 306 lb 8 oz - 307 lb   BMI - 48.44 kg/m2 52.22 kg/m2 52.52 kg/m2 52.61 kg/m2 - 52.7 kg/m2      Last 3 Recorded Weights in this Encounter    05/07/18 0756 05/08/18 0959 05/09/18 1753   Weight: 128.2 kg (282 lb 9.6 oz) 129 kg (284 lb 6.3 oz) 128 kg (282 lb 3 oz)      Weight Source: Bed  Height: 5' 4\" (162.6 cm),    Body mass index is 48.44 kg/(m^2). IBW : 54.4 kg (120 lb),    Usual Body Weight: 135.2 kg (298 lb) (1/2018),      Labs:  Lab Results   Component Value Date/Time    Sodium 130 (L) 05/10/2018 03:50 AM    Potassium 3.6 05/10/2018 03:50 AM    Chloride 90 (L) 05/10/2018 03:50 AM    CO2 31 05/10/2018 03:50 AM    Glucose 155 (H) 05/10/2018 03:50 AM    BUN 18 05/10/2018 03:50 AM    Creatinine 1.26 (H) 05/10/2018 03:50 AM    Calcium 8.5 05/10/2018 03:50 AM    Magnesium 2.2 05/10/2018 03:50 AM    Phosphorus 3.3 05/09/2018 02:11 AM    Albumin 2.1 (L) 05/01/2018 01:20 AM     No results found for: HBA1C, HGBE8, UGV6PRWR, BYE2QYYB  Lab Results   Component Value Date/Time    Glucose 155 (H) 05/10/2018 03:50 AM    Glucose (POC) 177 (H) 05/10/2018 07:12 AM      Lab Results   Component Value Date/Time    ALT (SGPT) 17 05/01/2018 01:20 AM    AST (SGOT) 11 (L) 05/01/2018 01:20 AM    Alk.  phosphatase 102 05/01/2018 01:20 AM    Bilirubin, direct 0.1 07/07/2009 06:38 PM    Bilirubin, total 0.5 05/01/2018 01:20 AM      [address]

## 2018-05-10 NOTE — WOUND CARE
Seen today for fistula management due to hole in pouch - NOT leaking at skin level seal.  So, some success with pouching for ~ 48 hours but difficulties with window in pouch and the chunky drainage blocking the wall suction. Full pouch change with specially ordered XL Hawk's wound manager using about 8 Hawk's rings and paste. Will continue to follow.   AMPARO Baptiste

## 2018-05-10 NOTE — ROUTINE PROCESS
Bedside shift change report given to Rodriguez Costello (oncoming nurse) by Yusuf Sahu (offgoing nurse). Report included the following information SBAR, Kardex, Intake/Output and MAR.

## 2018-05-11 LAB
GLUCOSE BLD STRIP.AUTO-MCNC: 155 MG/DL (ref 65–100)
SERVICE CMNT-IMP: ABNORMAL

## 2018-05-11 PROCEDURE — 74011250636 HC RX REV CODE- 250/636: Performed by: NURSE PRACTITIONER

## 2018-05-11 PROCEDURE — 97110 THERAPEUTIC EXERCISES: CPT

## 2018-05-11 PROCEDURE — 74011250636 HC RX REV CODE- 250/636: Performed by: SURGERY

## 2018-05-11 PROCEDURE — 74011250637 HC RX REV CODE- 250/637: Performed by: SURGERY

## 2018-05-11 PROCEDURE — 74011000250 HC RX REV CODE- 250: Performed by: SURGERY

## 2018-05-11 PROCEDURE — 77030011641 HC PASTE OST ADH BMS -A

## 2018-05-11 PROCEDURE — 74011250637 HC RX REV CODE- 250/637: Performed by: INTERNAL MEDICINE

## 2018-05-11 PROCEDURE — 97530 THERAPEUTIC ACTIVITIES: CPT

## 2018-05-11 PROCEDURE — 74011636637 HC RX REV CODE- 636/637: Performed by: NURSE PRACTITIONER

## 2018-05-11 PROCEDURE — 74011250637 HC RX REV CODE- 250/637: Performed by: PHYSICIAN ASSISTANT

## 2018-05-11 PROCEDURE — 77030010520

## 2018-05-11 PROCEDURE — 74011000258 HC RX REV CODE- 258: Performed by: NURSE PRACTITIONER

## 2018-05-11 PROCEDURE — 74011250637 HC RX REV CODE- 250/637: Performed by: NURSE PRACTITIONER

## 2018-05-11 PROCEDURE — 74011000250 HC RX REV CODE- 250: Performed by: NURSE PRACTITIONER

## 2018-05-11 PROCEDURE — 82962 GLUCOSE BLOOD TEST: CPT

## 2018-05-11 PROCEDURE — 74011250637 HC RX REV CODE- 250/637: Performed by: PHYSICAL MEDICINE & REHABILITATION

## 2018-05-11 PROCEDURE — 65270000029 HC RM PRIVATE

## 2018-05-11 RX ADMIN — POTASSIUM CHLORIDE 40 MEQ: 750 TABLET, FILM COATED, EXTENDED RELEASE ORAL at 17:56

## 2018-05-11 RX ADMIN — NYSTATIN: 100000 CREAM TOPICAL at 13:14

## 2018-05-11 RX ADMIN — MORPHINE SULFATE 100 MG: 100 TABLET, FILM COATED, EXTENDED RELEASE ORAL at 08:03

## 2018-05-11 RX ADMIN — MORPHINE SULFATE 45 MG: 15 TABLET ORAL at 17:56

## 2018-05-11 RX ADMIN — Medication 10 ML: at 10:18

## 2018-05-11 RX ADMIN — PROCHLORPERAZINE EDISYLATE 5 MG: 5 INJECTION INTRAMUSCULAR; INTRAVENOUS at 22:36

## 2018-05-11 RX ADMIN — SODIUM CHLORIDE, SODIUM LACTATE, POTASSIUM CHLORIDE, AND CALCIUM CHLORIDE 62 ML/HR: 600; 310; 30; 20 INJECTION, SOLUTION INTRAVENOUS at 08:03

## 2018-05-11 RX ADMIN — Medication 10 ML: at 22:00

## 2018-05-11 RX ADMIN — MORPHINE SULFATE 115 MG: 15 TABLET, EXTENDED RELEASE ORAL at 22:43

## 2018-05-11 RX ADMIN — Medication 10 ML: at 17:57

## 2018-05-11 RX ADMIN — NYSTATIN: 100000 CREAM TOPICAL at 17:58

## 2018-05-11 RX ADMIN — ONDANSETRON HYDROCHLORIDE 4 MG: 2 INJECTION INTRAMUSCULAR; INTRAVENOUS at 12:50

## 2018-05-11 RX ADMIN — MORPHINE SULFATE 45 MG: 15 TABLET ORAL at 10:15

## 2018-05-11 RX ADMIN — ASPIRIN 325 MG: 325 TABLET ORAL at 10:15

## 2018-05-11 RX ADMIN — I.V. FAT EMULSION 250 ML: 20 EMULSION INTRAVENOUS at 19:50

## 2018-05-11 RX ADMIN — DRONABINOL 2.5 MG: 2.5 CAPSULE ORAL at 10:15

## 2018-05-11 RX ADMIN — Medication 10 ML: at 05:24

## 2018-05-11 RX ADMIN — Medication 10 ML: at 13:05

## 2018-05-11 RX ADMIN — MORPHINE SULFATE 45 MG: 15 TABLET ORAL at 01:09

## 2018-05-11 RX ADMIN — ENOXAPARIN SODIUM 40 MG: 100 INJECTION SUBCUTANEOUS at 10:19

## 2018-05-11 RX ADMIN — MORPHINE SULFATE 45 MG: 15 TABLET ORAL at 05:24

## 2018-05-11 RX ADMIN — MORPHINE SULFATE 100 MG: 100 TABLET, FILM COATED, EXTENDED RELEASE ORAL at 16:08

## 2018-05-11 RX ADMIN — PROCHLORPERAZINE EDISYLATE 5 MG: 5 INJECTION INTRAMUSCULAR; INTRAVENOUS at 16:00

## 2018-05-11 RX ADMIN — POTASSIUM CHLORIDE 40 MEQ: 750 TABLET, FILM COATED, EXTENDED RELEASE ORAL at 10:15

## 2018-05-11 RX ADMIN — DRONABINOL 2.5 MG: 2.5 CAPSULE ORAL at 17:56

## 2018-05-11 RX ADMIN — MORPHINE SULFATE 45 MG: 15 TABLET ORAL at 22:36

## 2018-05-11 RX ADMIN — MORPHINE SULFATE 45 MG: 15 TABLET ORAL at 14:44

## 2018-05-11 RX ADMIN — FAMOTIDINE: 10 INJECTION, SOLUTION INTRAVENOUS at 19:50

## 2018-05-11 RX ADMIN — ONDANSETRON HYDROCHLORIDE 4 MG: 2 INJECTION INTRAMUSCULAR; INTRAVENOUS at 17:56

## 2018-05-11 NOTE — WOUND CARE
Fistula pouch change today. Assisted by ENOCH Alonso. Used a specially ordered pouch with several Hawk's rings and paste to achieve seal.  Clip attached to end for easy drainage. Window reinforced with tegaderm. Pouches left in room - will continue to follow.    Minh Lopez, BRYANN

## 2018-05-11 NOTE — PROGRESS NOTES
No complaints this AM.  Tm 99.6 HR: 118 BP: 121/75 Resp Rate: 16 9    Intake/Output Summary (Last 24 hours) at 05/11/18 1046  Last data filed at 05/11/18 0650   Gross per 24 hour   Intake          4305.03 ml   Output             8600 ml   Net         -4294.97 ml   Exam: Cor: RRR. Lungs: Bilateral breath sounds. Clear to auscultation. Abd: Soft. Non tender. 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 Labs:   Recent Results (from the past 12 hour(s))   GLUCOSE, POC    Collection Time: 05/11/18 10:28 AM   Result Value Ref Range    Glucose (POC) 155 (H) 65 - 100 mg/dL    Performed by [employer]    Diet and supplements as tolerated. Continue cyclic TPN and lipids. Labs in AM 5/12/2018 - If creatinine goes up, will add IVF when TPN is running. Will try and restart enteral feeds. Continue PT and OT. DVT prophylaxis - Lovenox. Wound care nurses following. Pain management per Palliative Care.

## 2018-05-11 NOTE — PROGRESS NOTES
Fistula pouch required reinforcement twice overnight, mostly leaking from window seal on bag. Bedside shift change report given to Yunior Lopez (oncoming nurse) by Boris Fletcher RN (offgoing nurse). Report included the following information SBAR, Kardex, Intake/Output, MAR and Accordion.

## 2018-05-11 NOTE — PROGRESS NOTES
Problem: Mobility Impaired (Adult and Pediatric)  Goal: *Acute Goals and Plan of Care (Insert Text)  Physical Therapy Goals  Goals reassessed 5/11/2018 and upgraded to below:  1. Patient will move from supine to sit and sit to supine  in bed with minimal assistance/contact guard assist NOT using chair position for bed within 7 day(s). 2.  Patient will transfer from bed to chair and chair to bed with supervision/set-up using the least restrictive device within 7 day(s). 3.  Patient will perform sit to stand with distant supervision/set-up within 7 day(s). 4.  Patient will ambulate with supervision/set-up for 300 feet with the least restrictive device within 7 day(s). Goals reassessed 5/4/18, updated and remain appropriate over next 7 days  Continue progress towards current goals 4/26/18  Update 4/18/2018  1. Patient will move from supine to sit and sit to supine  in bed with minimal assistance  within 7 day(s). MET on 5/4/18-Updated goal:  Patient will move from supine to sit and sit to supine  in bed with supervision within 7 day(s). 2.  Patient will transfer from bed to chair and chair to bed with minimal assistance  using the least restrictive device within 7 day(s). MET on 5/4/18-Updated goal:  Patient will transfer from bed to chair and chair to bed with supervision using the least restrictive device within 7 day(s). 3.  Patient will perform sit to stand with SBA within 7 days. MET  4. Patient will ambulate 150' with SBA x 1 using LRAD within 7 days. MET    Update 4/6/2018  1. Patient will move from supine to sit and sit to supine  in bed with moderate assistance  within 7 day(s). 2.  Patient will transfer from bed to chair and chair to bed with moderate assistance  using the least restrictive device within 7 day(s). 3.  Patient will perform sit to stand with CGA within 7 days  4. Patient will ambulate 22' with moderate assist x 1 using LRAD within 7 days. Revised 3/27/2018  1. Patient will move from supine to sit and sit to supine  in bed with moderate assistance  within 7 day(s). 2.  Patient will transfer from bed to chair and chair to bed with moderate assistance  using the least restrictive device within 7 day(s). 3.  Patient will perform sit to stand with moderate assistance  within 7 day(s). 4.  Patient will ambulate 100' with moderate assist x1 using the least restrictive device within 7 day(s). Revisited 3/19/2018, goals remain appropriate, carry over    Physical Therapy Goals  Initiated 3/8/2018  1. Patient will move from supine to sit and sit to supine  in bed with moderate assistance  within 7 day(s). 2.  Patient will transfer from bed to chair and chair to bed with moderate assistance  using the least restrictive device within 7 day(s). 3.  Patient will perform sit to stand with moderate assistance  within 7 day(s). 4.  PT will assess gait with the least restrictive device within 7 day(s). physical Therapy TREATMENT: WEEKLY REASSESSMENT  Patient: Rudy Cristina (38 y.o. female)  Date: 5/11/2018  Diagnosis: FISTULA  Small bowel fistula  INTEROCUTANOUS FISTULA <principal problem not specified>  Procedure(s) (LRB):  OPEN JEJUNOSTOMY TUBE PLACEMENT UNDER FLURO/ WOUND VAC CHANGE. (N/A) 29 Days Post-Op  Precautions: Fall, Skin, Contact  Chart, physical therapy assessment, plan of care and goals were reviewed. ASSESSMENT:  Patient agreeable to re-evaluation after two earlier attempts found patient occupied. Attempted to assess patient' bed mobility but patient became tearful at the suggestion she attempt (with assistance) to try to get up to EOB without using the bed-chair position feature to assist.  Patient fearful of leakage in abdominal dressings/drains.   Once in sitting via chair position, patient SBA for transfer to standing and tolerated gait training with RW x 160' but did have multiple c/o during mobility (nausea, LEs bothered her when attempted to lift vs/ slide her feet per PT suggestion, gait belt causing heartburn, did not wish to be talked to). However, no safety concerns noted and patient able to tolerate increased distance today. Returned to bed in chair position and left with OT present. Patient's progression toward goals since last assessment: Patient demosntrates steady progress this period, increasing ambulation tolerance and requiring less assistance with mobility tasks. Still requires routine encouragement to fully participate but better engaged than prior sessions. Continues to benefit from skilled PT in the acute setting and would be a good candidate for IP rehab to work to fully regain prior level of function. PLAN:  Goals have been updated based on progression since last assessment. Patient continues to benefit from skilled intervention to address the above impairments. Continue to follow the patient 5 times a week to address goals. Planned Interventions:  [x]              Bed Mobility Training             [x]       Neuromuscular Re-Education  [x]              Transfer Training                   []       Orthotic/Prosthetic Training  [x]              Gait Training                         []       Modalities  [x]              Therapeutic Exercises           []       Edema Management/Control  [x]              Therapeutic Activities            [x]       Patient and Family Training/Education  []              Other (comment):  Discharge Recommendations: Inpatient Rehab  Further Equipment Recommendations for Discharge: TBD in rehab     SUBJECTIVE:   Patient stated I just don't like talking while I am walking.   \"I don't want to get in trouble but I don't want to get up unless I use the bed to assist\"    OBJECTIVE DATA SUMMARY:   Critical Behavior:  Neurologic State: Alert  Orientation Level: Oriented X4  Cognition: Appropriate decision making  Safety/Judgement: Awareness of environment, Insight into deficits    Strength:   Strength: Generally decreased, functional                      Functional Mobility Training:  Bed Mobility:     Supine to Sit:  (Used bed into chair position to protect abdominal drains)              Transfers:  Sit to Stand: Stand-by assistance  Stand to Sit: Stand-by assistance                             Balance:  Sitting: Intact  Sitting - Static: Good (unsupported)  Sitting - Dynamic: Good (unsupported)  Standing: Impaired  Standing - Static: Good  Standing - Dynamic : Fair  Ambulation/Gait Training:  Distance (ft): 160 Feet (ft)  Assistive Device: Walker, rolling;Gait belt  Ambulation - Level of Assistance: Stand-by assistance        Gait Abnormalities: Decreased step clearance        Base of Support: Widened     Speed/Liz: Pace decreased (<100 feet/min)  Step Length: Right shortened;Left shortened                        Pain:  Pain Scale 1: Numeric (0 - 10)  Pain Intensity 1: 8  Pain Location 1: Abdomen  Pain Orientation 1: Anterior     Pain Intervention(s) 1: Medication (see MAR) (sheduled)  Activity Tolerance:   Improved, fair - 160' ambulation with RW with SBA    Please refer to the flowsheet for vital signs taken during this treatment.   After treatment:   []  Patient left in no apparent distress sitting up in chair  [x]  Patient left in no apparent distress in bed  [x]  Call bell left within reach  [x]  Nursing notified  [x]  OT present  []  Bed alarm activated    COMMUNICATION/COLLABORATION:   The patients plan of care was discussed with: Registered Nurse    Brooke Thomas, PT   Time Calculation: 21 mins

## 2018-05-11 NOTE — PROGRESS NOTES
Follow up visit attempted with Tawana Do. Occupational Therapist was at bedside. Will attempt to follow-up again as able.     Buhmika Castillo, Palliative

## 2018-05-11 NOTE — PROGRESS NOTES
Problem: Self Care Deficits Care Plan (Adult)  Goal: *Acute Goals and Plan of Care (Insert Text)  Occupational Therapy Goals:  Weekly re-assessment- 5/11/18  1. Patient will complete bathing upper body to knees with setup within 7 days. CONT  2. Patient will complete bariatric BSC transfer with supervision bariatric RW within 7 days. CONT   3. Patient will complete lower body dressing activities with supervision within 7 days using AE. CONT  4. Patient will perform standing for clothing management tasks (toileting or dressing) supervision without AD support within 7 days. GOAL MET- upgraded  5. Patient will complete light resistance band UE exercise program in standing with supervision within 7 days. GOAL MET- upgraded    Goals reviewed and continued: 5/4/18  Revised 4/27/18  1. Patient will complete bathing upper body to knees with setup within 7 days upgraded 4/27  2. Patient will complete bariatric BSC transfer with supervision bariatric RW within 7 days. Upgraded 4/27  3. Patient will complete lower body dressing activities with supervision within 7 days using AE  4. Patient will perform standing for clothing management tasks (toileting or dressing) supervision bariatric RW within 7 days. Upgraded 4/27  5. Patient will complete light resistance band UE exercise program independently within 7 days. Upgraded 4/27/18    Reevaluated 4/20/2018  1. Patient will complete grooming activity sitting EOB without support for 10 minutes within 7 days  2. Patient will complete BSC transfer with min A x 1 persons within 7 days. 3. Patient will complete lower body dressing activities with supervision within 7 days using AE  4. Patient will perform standing for clothing management tasks (toileting or dressing) with min A x 1 persons within 7 days. 5. Patient will complete UE exercise program independently within 7 days. Reevaluated 4/10/2018  1.   Patient will complete grooming activity sitting EOB without support for 10 minutes within 7 days. 2.  Patient will complete BSC transfer with min A x 2 persons within 7 days. 3.  Patient will complete lower body dressing activities with min A within 7 days. 4.  Patient will perform standing for clothing management tasks (toileting or dressing) with min A x 2 persons within 7 days. 5.  Patient will complete UE exercise program independently within 7 days. Initiated 4/4/2018  1. Patient will perform rolling and pulling self up to head of bed with min assist to assist with ADL's at bed level within 7 day(s). 2.  Patient will perform static sitting edge of bed > or = 10 minutes with supervision/set-up within 7 day(s). 3.  Patient will perform static standing with bilateral UE support on rolling walker > or = 3 minutes with minimal assistance/contact guard assist x's 2 within 7 day(s). 4.  Patient will perform toilet transfers with moderate assistance x's 2 to bedside commode within 7 days. 5.  Patient will perform bilateral UE AROM and strengthening exercises throughout day and grade exercises prn to increase demand within 7 day(s). Occupational Therapy TREATMENT: WEEKLY REASSESSMENT  Patient: Estelita Bush (38 y.o. female)  Date: 5/11/2018  Diagnosis: FISTULA  Small bowel fistula  INTEROCUTANOUS FISTULA <principal problem not specified>  Procedure(s) (LRB):  OPEN JEJUNOSTOMY TUBE PLACEMENT UNDER FLURO/ WOUND VAC CHANGE. (N/A) 29 Days Post-Op  Precautions: Fall, Skin, Contact  Chart, occupational therapy assessment, plan of care, and goals were reviewed. ASSESSMENT:  Nursing cleared for therapy. Received patient amb in dick with PT returning to room. Patient agreeable to OT services with seated rest with bed in chair position. Patient anxious regarding her ostomy pouch leaking in previous session, secure this session. Agreeable to exercise and encouraged to complete in standing however reports concern with level of fatigue at this time. Completed in sitting. She reports completion of exercises earlier in AM.   Encouraged ADL tasks in standing however she denies desire to complete ADL but agreeable to stand. Good static standing balance with no AD for 2 mins, terminated secondary to reports of nausea. Left with bed in chair position, patient to call nurse to return bed to supine. Nurse aware. Initially tearful during session then with increased participation when talking about her previous job before disability. She found a lot of meaning in her work and is hopeful to find a meaningful occupation once she heals from this. Patient with limited participation this week with OT services with decline x3 days secondary to fatigue, nausea or leakage with her ostomy bag with participation on 5/10. Provided education on role of OT services with importance of participation and patient with good verbal understanding. Patient has demonstrated increased standing tolerance and ambulation with standing goal increased to complete without support and good carry over BUE exercise program with red band with goal increased to standing. Recommend inpatient rehab once medically stable, patient in agreement and reports she \"can't wait to get to [address]. \"     Recommend with nursing patient to complete as able in order to maintain strength, endurance and independence: seated ADLs with supervision/setup, bed in chair position or shuttle chair 3x/day and mobilizing to the [facility] for toileting with CGA. Previous sessions she has participated with LB dressing verbal training and understanding sock aide. Recommend demonstration with reacher for donning pants/underwear with teach back in future session as patient is in agreement.      Progression toward goals:  []            Improving appropriately and progressing toward goals  [x]            Improving slowly and progressing toward goals  []            Not making progress toward goals and plan of care will be adjusted PLAN:  Goals have been updated based on progression since last assessment. Patient continues to benefit from skilled intervention to address the above impairments. Continue to follow patient 5 times a week to address goals. Planned Interventions:  [x]                    Self Care Training                  [x]             Therapeutic Activities  [x]                    Functional Mobility Training    []             Cognitive Retraining  [x]                    Therapeutic Exercises           [x]             Endurance Activities  [x]                    Balance Training                   []             Neuromuscular Re-Education  []                    Visual/Perceptual Training     [x]        Home Safety Training  [x]                    Patient Education                 [x]             Family Training/Education  []                    Other (comment):  Discharge Recommendations: Inpatient Rehab  Further Equipment Recommendations for Discharge: TBD rehab     SUBJECTIVE:   Patient stated I really liked my job. I was good at it    OBJECTIVE DATA SUMMARY:   Cognitive/Behavioral Status:  Neurologic State: Alert  Orientation Level: Oriented X4  Cognition: Appropriate decision making             Functional Mobility and Transfers for ADLs:  Bed Mobility:  Supine to Sit:  (Used bed into chair position to protect abdominal drains)  Scooting: Supervision    Transfers:  Sit to Stand: Stand-by assistance (from bed in chair position)       Balance:  Sitting: Intact  Sitting - Static: Good (unsupported)  Sitting - Dynamic: Good (unsupported)  Standing: Impaired  Standing - Static: Good  Standing - Dynamic : Fair    ADL Intervention:   Provided education on role of OT and occupations as a means of rehab. Patient reporting fatigue from PT session, but agreeable to complete there ex in sitting. Following therex agreeable to standing, completed x 2 static standing initially no BUE support then with minimal BUE support.   X 2 trials approx 1-2 mins each trial in order to prepare for ADL tasks. Patient expression concern regarding finding a meaningful occupation with this illness. Extensive discussion regarding her previous job and the aspects she enjoyed and felt appropriately challenged with. Encouraged to think of future roles as she continues to recover. Therapeutic Exercises:   Previous sessions she has participated with LB dressing verbal training and understanding sock aide. Recommend demonstration with reacher for donning pants/underwear with teach back in future session as patient is in agreement. Patient performed 10 reps: horizontal abduction, elbow extension, bicep curl, and lateral elbow extension. Completed in sitting. Reports completion in AM.    Pain:  Pain Scale 1: Numeric (0 - 10)  Pain Intensity 1: 8  Pain Location 1: Abdomen  Pain Orientation 1: Anterior     Pain Intervention(s) 1: Medication (see MAR) (sheduled)    Activity Tolerance:   Good sitting tolerance with bed in chair position. Nauesa following standing trials. [facility]  notified.      After treatment:   [] Patient left in no apparent distress sitting up in chair  [x] Patient left in no apparent distress in bed in chair position   [x] Call bell left within reach  [x] Nursing notified  [] Caregiver present  [] Bed alarm activated    COMMUNICATION/COLLABORATION:   The patients plan of care was discussed with: Physical Therapist, Registered Nurse and patient    Delight Dates, OT  Time Calculation: 40 mins

## 2018-05-12 LAB
ANION GAP SERPL CALC-SCNC: 4 MMOL/L (ref 5–15)
BUN SERPL-MCNC: 29 MG/DL (ref 6–20)
BUN/CREAT SERPL: 25 (ref 12–20)
CALCIUM SERPL-MCNC: 8.9 MG/DL (ref 8.5–10.1)
CHLORIDE SERPL-SCNC: 101 MMOL/L (ref 97–108)
CO2 SERPL-SCNC: 26 MMOL/L (ref 21–32)
CREAT SERPL-MCNC: 1.18 MG/DL (ref 0.55–1.02)
GLUCOSE BLD STRIP.AUTO-MCNC: 199 MG/DL (ref 65–100)
GLUCOSE BLD STRIP.AUTO-MCNC: 269 MG/DL (ref 65–100)
GLUCOSE SERPL-MCNC: 227 MG/DL (ref 65–100)
MAGNESIUM SERPL-MCNC: 1.9 MG/DL (ref 1.6–2.4)
PHOSPHATE SERPL-MCNC: 1.9 MG/DL (ref 2.6–4.7)
POTASSIUM SERPL-SCNC: 5.2 MMOL/L (ref 3.5–5.1)
SERVICE CMNT-IMP: ABNORMAL
SERVICE CMNT-IMP: ABNORMAL
SODIUM SERPL-SCNC: 131 MMOL/L (ref 136–145)

## 2018-05-12 PROCEDURE — 84100 ASSAY OF PHOSPHORUS: CPT | Performed by: SURGERY

## 2018-05-12 PROCEDURE — 36415 COLL VENOUS BLD VENIPUNCTURE: CPT | Performed by: SURGERY

## 2018-05-12 PROCEDURE — 74011250636 HC RX REV CODE- 250/636: Performed by: SURGERY

## 2018-05-12 PROCEDURE — 74011250637 HC RX REV CODE- 250/637: Performed by: INTERNAL MEDICINE

## 2018-05-12 PROCEDURE — 74011250636 HC RX REV CODE- 250/636: Performed by: NURSE PRACTITIONER

## 2018-05-12 PROCEDURE — 82962 GLUCOSE BLOOD TEST: CPT

## 2018-05-12 PROCEDURE — 74011250637 HC RX REV CODE- 250/637: Performed by: PHYSICIAN ASSISTANT

## 2018-05-12 PROCEDURE — 74011636637 HC RX REV CODE- 636/637: Performed by: SURGERY

## 2018-05-12 PROCEDURE — 74011250637 HC RX REV CODE- 250/637: Performed by: NURSE PRACTITIONER

## 2018-05-12 PROCEDURE — 74011250637 HC RX REV CODE- 250/637: Performed by: PHYSICAL MEDICINE & REHABILITATION

## 2018-05-12 PROCEDURE — 74011000250 HC RX REV CODE- 250: Performed by: SURGERY

## 2018-05-12 PROCEDURE — 74011250637 HC RX REV CODE- 250/637: Performed by: SURGERY

## 2018-05-12 PROCEDURE — 83735 ASSAY OF MAGNESIUM: CPT | Performed by: SURGERY

## 2018-05-12 PROCEDURE — 74011000258 HC RX REV CODE- 258: Performed by: SURGERY

## 2018-05-12 PROCEDURE — 80048 BASIC METABOLIC PNL TOTAL CA: CPT | Performed by: SURGERY

## 2018-05-12 PROCEDURE — 65270000029 HC RM PRIVATE

## 2018-05-12 RX ADMIN — MORPHINE SULFATE 45 MG: 15 TABLET ORAL at 09:01

## 2018-05-12 RX ADMIN — MORPHINE SULFATE 45 MG: 15 TABLET ORAL at 06:08

## 2018-05-12 RX ADMIN — Medication 10 ML: at 09:06

## 2018-05-12 RX ADMIN — PROCHLORPERAZINE EDISYLATE 5 MG: 5 INJECTION INTRAMUSCULAR; INTRAVENOUS at 06:08

## 2018-05-12 RX ADMIN — Medication 10 ML: at 21:49

## 2018-05-12 RX ADMIN — DRONABINOL 2.5 MG: 2.5 CAPSULE ORAL at 18:12

## 2018-05-12 RX ADMIN — MORPHINE SULFATE 100 MG: 100 TABLET, FILM COATED, EXTENDED RELEASE ORAL at 16:10

## 2018-05-12 RX ADMIN — ACETAMINOPHEN 650 MG: 325 TABLET, FILM COATED ORAL at 23:30

## 2018-05-12 RX ADMIN — MORPHINE SULFATE 45 MG: 15 TABLET ORAL at 02:05

## 2018-05-12 RX ADMIN — MORPHINE SULFATE 45 MG: 15 TABLET ORAL at 13:14

## 2018-05-12 RX ADMIN — Medication 10 ML: at 05:01

## 2018-05-12 RX ADMIN — MORPHINE SULFATE 45 MG: 15 TABLET ORAL at 21:48

## 2018-05-12 RX ADMIN — FAMOTIDINE: 10 INJECTION, SOLUTION INTRAVENOUS at 18:54

## 2018-05-12 RX ADMIN — Medication 10 ML: at 02:30

## 2018-05-12 RX ADMIN — MORPHINE SULFATE 100 MG: 100 TABLET, FILM COATED, EXTENDED RELEASE ORAL at 08:29

## 2018-05-12 RX ADMIN — ASPIRIN 325 MG: 325 TABLET ORAL at 09:00

## 2018-05-12 RX ADMIN — MORPHINE SULFATE 30 MG: 15 TABLET ORAL at 20:20

## 2018-05-12 RX ADMIN — Medication 10 ML: at 13:16

## 2018-05-12 RX ADMIN — DRONABINOL 2.5 MG: 2.5 CAPSULE ORAL at 09:00

## 2018-05-12 RX ADMIN — ENOXAPARIN SODIUM 40 MG: 100 INJECTION SUBCUTANEOUS at 10:44

## 2018-05-12 RX ADMIN — PROMETHAZINE HYDROCHLORIDE 12.5 MG: 25 INJECTION INTRAMUSCULAR; INTRAVENOUS at 02:30

## 2018-05-12 RX ADMIN — MORPHINE SULFATE 115 MG: 15 TABLET, EXTENDED RELEASE ORAL at 23:00

## 2018-05-12 RX ADMIN — MORPHINE SULFATE 45 MG: 15 TABLET ORAL at 18:12

## 2018-05-12 RX ADMIN — PROCHLORPERAZINE EDISYLATE 5 MG: 5 INJECTION INTRAMUSCULAR; INTRAVENOUS at 15:10

## 2018-05-12 NOTE — PROGRESS NOTES
Progress Note    Patient: Zuleyma Xie MRN: [id number]  SSN: [id number]    YOB: 1975  Age: 38 y.o. Sex: female      Admit Date: 3/7/2018    30 Days Post-Op    Procedure:  Procedure(s):  OPEN JEJUNOSTOMY TUBE PLACEMENT UNDER FLURO/ WOUND VAC CHANGE. Subjective:     Patient with no new complaints. She is still having leaks from her appliance. Still putting out multiple liters a day from her fistula. Objective:     Visit Vitals    /68    Pulse 100    Temp 98.8 °F (37.1 °C)    Resp 18    Ht 5' 4\" (1.626 m)    Wt 300 lb 11.3 oz (136.4 kg)    SpO2 100%    BMI 51.62 kg/m2       Temp (24hrs), Av.8 °F (37.1 °C), Min:98.4 °F (36.9 °C), Max:99 °F (37.2 °C)      Physical Exam:    Gen- Alert in NAD  Abd- fistula peristalsing with significant drainage. Data Review: images and reports reviewed    Lab Review: All lab results for the last 24 hours reviewed.   Recent Results (from the past 24 hour(s))   MAGNESIUM    Collection Time: 18  5:00 AM   Result Value Ref Range    Magnesium 1.9 1.6 - 2.4 mg/dL   PHOSPHORUS    Collection Time: 18  5:00 AM   Result Value Ref Range    Phosphorus 1.9 (L) 2.6 - 4.7 MG/DL   METABOLIC PANEL, BASIC    Collection Time: 18  5:00 AM   Result Value Ref Range    Sodium 131 (L) 136 - 145 mmol/L    Potassium 5.2 (H) 3.5 - 5.1 mmol/L    Chloride 101 97 - 108 mmol/L    CO2 26 21 - 32 mmol/L    Anion gap 4 (L) 5 - 15 mmol/L    Glucose 227 (H) 65 - 100 mg/dL    BUN 29 (H) 6 - 20 MG/DL    Creatinine 1.18 (H) 0.55 - 1.02 MG/DL    BUN/Creatinine ratio 25 (H) 12 - 20      GFR est AA >60 >60 ml/min/1.73m2    GFR est non-AA 51 (L) >60 ml/min/1.73m2    Calcium 8.9 8.5 - 10.1 MG/DL   GLUCOSE, POC    Collection Time: 18  7:34 AM   Result Value Ref Range    Glucose (POC) 269 (H) 65 - 100 mg/dL    Performed by Nida Vicente (PCT)        Assessment:     Hospital Problems  Date Reviewed: 10/27/2017          Codes Class Noted POA    Small bowel fistula ICD-10-CM: M69.6  ICD-9-CM: 569.81  3/7/2018 Unknown              Plan/Recommendations/Medical Decision Making:   Continue cyclic TPN  Continue Wound care  Follow fistula amounts-   May have some difficulty having her placed due to the TPN. She is not currently stable due go home on home TPN as she needs significant help with wound care and the amounts coming from her Fistula would be difficult to handle at home.        Signed By: Jay Whitehead MD     May 12, 2018

## 2018-05-12 NOTE — PROGRESS NOTES
Bedside shift change report given to Jessie Reyna (oncoming nurse) by Ivone Sierra (offgoing nurse). Report included the following information SBAR.

## 2018-05-13 LAB
GLUCOSE BLD STRIP.AUTO-MCNC: 180 MG/DL (ref 65–100)
GLUCOSE BLD STRIP.AUTO-MCNC: 182 MG/DL (ref 65–100)
SERVICE CMNT-IMP: ABNORMAL
SERVICE CMNT-IMP: ABNORMAL

## 2018-05-13 PROCEDURE — 74011250637 HC RX REV CODE- 250/637: Performed by: NURSE PRACTITIONER

## 2018-05-13 PROCEDURE — 74011000250 HC RX REV CODE- 250: Performed by: SURGERY

## 2018-05-13 PROCEDURE — 82962 GLUCOSE BLOOD TEST: CPT

## 2018-05-13 PROCEDURE — 74011250637 HC RX REV CODE- 250/637: Performed by: INTERNAL MEDICINE

## 2018-05-13 PROCEDURE — 74011250636 HC RX REV CODE- 250/636: Performed by: SURGERY

## 2018-05-13 PROCEDURE — 74011250637 HC RX REV CODE- 250/637: Performed by: PHYSICIAN ASSISTANT

## 2018-05-13 PROCEDURE — 65270000029 HC RM PRIVATE

## 2018-05-13 PROCEDURE — 74011250636 HC RX REV CODE- 250/636: Performed by: NURSE PRACTITIONER

## 2018-05-13 PROCEDURE — 74011250637 HC RX REV CODE- 250/637: Performed by: PHYSICAL MEDICINE & REHABILITATION

## 2018-05-13 PROCEDURE — 74011636637 HC RX REV CODE- 636/637: Performed by: SURGERY

## 2018-05-13 PROCEDURE — 74011000258 HC RX REV CODE- 258: Performed by: SURGERY

## 2018-05-13 RX ADMIN — MORPHINE SULFATE 45 MG: 15 TABLET ORAL at 10:15

## 2018-05-13 RX ADMIN — PROCHLORPERAZINE EDISYLATE 5 MG: 5 INJECTION INTRAMUSCULAR; INTRAVENOUS at 11:35

## 2018-05-13 RX ADMIN — MORPHINE SULFATE 45 MG: 15 TABLET ORAL at 06:15

## 2018-05-13 RX ADMIN — FAMOTIDINE: 10 INJECTION, SOLUTION INTRAVENOUS at 19:17

## 2018-05-13 RX ADMIN — Medication 10 ML: at 14:37

## 2018-05-13 RX ADMIN — MORPHINE SULFATE 115 MG: 15 TABLET, EXTENDED RELEASE ORAL at 22:45

## 2018-05-13 RX ADMIN — DRONABINOL 2.5 MG: 2.5 CAPSULE ORAL at 17:27

## 2018-05-13 RX ADMIN — MORPHINE SULFATE 45 MG: 15 TABLET ORAL at 14:36

## 2018-05-13 RX ADMIN — NYSTATIN: 100000 CREAM TOPICAL at 17:29

## 2018-05-13 RX ADMIN — Medication 10 ML: at 10:16

## 2018-05-13 RX ADMIN — Medication 10 ML: at 14:38

## 2018-05-13 RX ADMIN — MORPHINE SULFATE 45 MG: 15 TABLET ORAL at 17:28

## 2018-05-13 RX ADMIN — MORPHINE SULFATE 45 MG: 15 TABLET ORAL at 21:17

## 2018-05-13 RX ADMIN — ENOXAPARIN SODIUM 40 MG: 100 INJECTION SUBCUTANEOUS at 10:18

## 2018-05-13 RX ADMIN — Medication 10 ML: at 06:19

## 2018-05-13 RX ADMIN — MORPHINE SULFATE 100 MG: 100 TABLET, FILM COATED, EXTENDED RELEASE ORAL at 16:11

## 2018-05-13 RX ADMIN — LORAZEPAM 1 MG: 2 INJECTION INTRAMUSCULAR; INTRAVENOUS at 19:09

## 2018-05-13 RX ADMIN — Medication 10 ML: at 21:18

## 2018-05-13 RX ADMIN — DRONABINOL 2.5 MG: 2.5 CAPSULE ORAL at 08:49

## 2018-05-13 RX ADMIN — ASPIRIN 325 MG: 325 TABLET ORAL at 08:49

## 2018-05-13 RX ADMIN — MORPHINE SULFATE 100 MG: 100 TABLET, FILM COATED, EXTENDED RELEASE ORAL at 07:41

## 2018-05-13 RX ADMIN — MORPHINE SULFATE 45 MG: 15 TABLET ORAL at 02:16

## 2018-05-13 NOTE — PROGRESS NOTES
Progress Note    Patient: Chilo Medina MRN: [id number]  SSN: [id number]    YOB: 1975  Age: 38 y.o. Sex: female      Admit Date: 3/7/2018    31 Days Post-Op    Procedure:  Procedure(s):  OPEN JEJUNOSTOMY TUBE PLACEMENT UNDER FLURO/ WOUND VAC CHANGE. Subjective:     Patient without new complaints. Objective:     Visit Vitals    /63    Pulse 99    Temp 98.6 °F (37 °C)    Resp 18    Ht 5' 4\" (1.626 m)    Wt 300 lb 11.3 oz (136.4 kg)    SpO2 95%    BMI 51.62 kg/m2       Temp (24hrs), Av.9 °F (37.2 °C), Min:98.5 °F (36.9 °C), Max:100.2 °F (37.9 °C)      Physical Exam:    gen- Alert   Abd- Soft / fistula draining 6500      Data Review: images and reports reviewed    Lab Review: All lab results for the last 24 hours reviewed. Recent Results (from the past 24 hour(s))   GLUCOSE, POC    Collection Time: 18  9:44 PM   Result Value Ref Range    Glucose (POC) 199 (H) 65 - 100 mg/dL    Performed by Aaron Quintero    GLUCOSE, POC    Collection Time: 18 10:13 AM   Result Value Ref Range    Glucose (POC) 180 (H) 65 - 100 mg/dL    Performed by Roman Auguste        Assessment:     Hospital Problems  Date Reviewed: 10/27/2017          Codes Class Noted POA    Small bowel fistula ICD-10-CM: K63.2  ICD-9-CM: 569.81  3/7/2018 Unknown              Plan/Recommendations/Medical Decision Making:   Continue Wound manager  Labs- and prealbumin tomorrow  Dressing change tomorrow. Will ask palliative to re-eval tomorrow to assist in tapering pain medication- She is interested in coming down from the high dose that she is on.      Signed By: Cynthia Rico MD     May 13, 2018

## 2018-05-13 NOTE — PROGRESS NOTES
Bedside shift change report given to Sarah (oncoming nurse) by Redd Galo   (offgoing nurse). Report included the following information SBAR, Kardex, Intake/Output, MAR and Recent Results.

## 2018-05-14 LAB
ALBUMIN SERPL-MCNC: 2 G/DL (ref 3.5–5)
ALBUMIN/GLOB SERPL: 0.5 {RATIO} (ref 1.1–2.2)
ALP SERPL-CCNC: 146 U/L (ref 45–117)
ALT SERPL-CCNC: 34 U/L (ref 12–78)
ANION GAP SERPL CALC-SCNC: 8 MMOL/L (ref 5–15)
AST SERPL-CCNC: 38 U/L (ref 15–37)
BILIRUB SERPL-MCNC: 0.3 MG/DL (ref 0.2–1)
BUN SERPL-MCNC: 36 MG/DL (ref 6–20)
BUN/CREAT SERPL: 36 (ref 12–20)
CALCIUM SERPL-MCNC: 8.6 MG/DL (ref 8.5–10.1)
CHLORIDE SERPL-SCNC: 106 MMOL/L (ref 97–108)
CO2 SERPL-SCNC: 21 MMOL/L (ref 21–32)
CREAT SERPL-MCNC: 1.01 MG/DL (ref 0.55–1.02)
ERYTHROCYTE [DISTWIDTH] IN BLOOD BY AUTOMATED COUNT: 17.1 % (ref 11.5–14.5)
GLOBULIN SER CALC-MCNC: 4.4 G/DL (ref 2–4)
GLUCOSE BLD STRIP.AUTO-MCNC: 158 MG/DL (ref 65–100)
GLUCOSE BLD STRIP.AUTO-MCNC: 169 MG/DL (ref 65–100)
GLUCOSE SERPL-MCNC: 206 MG/DL (ref 65–100)
HCT VFR BLD AUTO: 25.5 % (ref 35–47)
HGB BLD-MCNC: 7.9 G/DL (ref 11.5–16)
MAGNESIUM SERPL-MCNC: 2 MG/DL (ref 1.6–2.4)
MCH RBC QN AUTO: 29 PG (ref 26–34)
MCHC RBC AUTO-ENTMCNC: 31 G/DL (ref 30–36.5)
MCV RBC AUTO: 93.8 FL (ref 80–99)
NRBC # BLD: 0 K/UL (ref 0–0.01)
NRBC BLD-RTO: 0 PER 100 WBC
PHOSPHATE SERPL-MCNC: 5.6 MG/DL (ref 2.6–4.7)
PLATELET # BLD AUTO: 328 K/UL (ref 150–400)
PMV BLD AUTO: 9.2 FL (ref 8.9–12.9)
POTASSIUM SERPL-SCNC: 5.2 MMOL/L (ref 3.5–5.1)
PREALB SERPL-MCNC: 14 MG/DL (ref 20–40)
PROT SERPL-MCNC: 6.4 G/DL (ref 6.4–8.2)
RBC # BLD AUTO: 2.72 M/UL (ref 3.8–5.2)
SERVICE CMNT-IMP: ABNORMAL
SERVICE CMNT-IMP: ABNORMAL
SODIUM SERPL-SCNC: 135 MMOL/L (ref 136–145)
WBC # BLD AUTO: 10.9 K/UL (ref 3.6–11)

## 2018-05-14 PROCEDURE — 97530 THERAPEUTIC ACTIVITIES: CPT

## 2018-05-14 PROCEDURE — 84100 ASSAY OF PHOSPHORUS: CPT | Performed by: SURGERY

## 2018-05-14 PROCEDURE — 74011250637 HC RX REV CODE- 250/637: Performed by: NURSE PRACTITIONER

## 2018-05-14 PROCEDURE — 36415 COLL VENOUS BLD VENIPUNCTURE: CPT | Performed by: SURGERY

## 2018-05-14 PROCEDURE — 77030011256 HC DRSG MEPILEX <16IN NO BORD MOLN -A

## 2018-05-14 PROCEDURE — 74011250636 HC RX REV CODE- 250/636: Performed by: SURGERY

## 2018-05-14 PROCEDURE — 74011250636 HC RX REV CODE- 250/636: Performed by: NURSE PRACTITIONER

## 2018-05-14 PROCEDURE — 82962 GLUCOSE BLOOD TEST: CPT

## 2018-05-14 PROCEDURE — 85027 COMPLETE CBC AUTOMATED: CPT | Performed by: SURGERY

## 2018-05-14 PROCEDURE — 65270000029 HC RM PRIVATE

## 2018-05-14 PROCEDURE — 74011250637 HC RX REV CODE- 250/637: Performed by: PHYSICIAN ASSISTANT

## 2018-05-14 PROCEDURE — 74011250637 HC RX REV CODE- 250/637: Performed by: PHYSICAL MEDICINE & REHABILITATION

## 2018-05-14 PROCEDURE — 74011636637 HC RX REV CODE- 636/637: Performed by: SURGERY

## 2018-05-14 PROCEDURE — 97535 SELF CARE MNGMENT TRAINING: CPT

## 2018-05-14 PROCEDURE — 83735 ASSAY OF MAGNESIUM: CPT | Performed by: SURGERY

## 2018-05-14 PROCEDURE — 97116 GAIT TRAINING THERAPY: CPT

## 2018-05-14 PROCEDURE — 77030010520

## 2018-05-14 PROCEDURE — 74011250637 HC RX REV CODE- 250/637: Performed by: INTERNAL MEDICINE

## 2018-05-14 PROCEDURE — 84134 ASSAY OF PREALBUMIN: CPT | Performed by: SURGERY

## 2018-05-14 PROCEDURE — 74011000258 HC RX REV CODE- 258: Performed by: SURGERY

## 2018-05-14 PROCEDURE — 80053 COMPREHEN METABOLIC PANEL: CPT | Performed by: SURGERY

## 2018-05-14 PROCEDURE — 74011000250 HC RX REV CODE- 250: Performed by: SURGERY

## 2018-05-14 RX ADMIN — PROCHLORPERAZINE EDISYLATE 5 MG: 5 INJECTION INTRAMUSCULAR; INTRAVENOUS at 09:58

## 2018-05-14 RX ADMIN — FAMOTIDINE: 10 INJECTION, SOLUTION INTRAVENOUS at 20:44

## 2018-05-14 RX ADMIN — ENOXAPARIN SODIUM 40 MG: 100 INJECTION SUBCUTANEOUS at 09:51

## 2018-05-14 RX ADMIN — DRONABINOL 2.5 MG: 2.5 CAPSULE ORAL at 18:35

## 2018-05-14 RX ADMIN — Medication 10 ML: at 10:00

## 2018-05-14 RX ADMIN — Medication 10 ML: at 22:00

## 2018-05-14 RX ADMIN — MORPHINE SULFATE 45 MG: 15 TABLET ORAL at 02:36

## 2018-05-14 RX ADMIN — MORPHINE SULFATE 100 MG: 100 TABLET, FILM COATED, EXTENDED RELEASE ORAL at 07:27

## 2018-05-14 RX ADMIN — ASPIRIN 325 MG: 325 TABLET ORAL at 09:47

## 2018-05-14 RX ADMIN — MORPHINE SULFATE 45 MG: 15 TABLET ORAL at 14:20

## 2018-05-14 RX ADMIN — MORPHINE SULFATE 115 MG: 15 TABLET, EXTENDED RELEASE ORAL at 22:57

## 2018-05-14 RX ADMIN — Medication 10 ML: at 05:28

## 2018-05-14 RX ADMIN — MORPHINE SULFATE 45 MG: 15 TABLET ORAL at 05:27

## 2018-05-14 RX ADMIN — MORPHINE SULFATE 45 MG: 15 TABLET ORAL at 18:35

## 2018-05-14 RX ADMIN — MORPHINE SULFATE 45 MG: 15 TABLET ORAL at 22:57

## 2018-05-14 RX ADMIN — MORPHINE SULFATE 100 MG: 100 TABLET, FILM COATED, EXTENDED RELEASE ORAL at 16:04

## 2018-05-14 RX ADMIN — NYSTATIN: 100000 CREAM TOPICAL at 18:00

## 2018-05-14 RX ADMIN — I.V. FAT EMULSION 250 ML: 20 EMULSION INTRAVENOUS at 19:27

## 2018-05-14 RX ADMIN — MORPHINE SULFATE 45 MG: 15 TABLET ORAL at 09:47

## 2018-05-14 RX ADMIN — DRONABINOL 2.5 MG: 2.5 CAPSULE ORAL at 09:47

## 2018-05-14 RX ADMIN — ONDANSETRON HYDROCHLORIDE 4 MG: 2 INJECTION INTRAMUSCULAR; INTRAVENOUS at 17:16

## 2018-05-14 RX ADMIN — FAMOTIDINE: 10 INJECTION, SOLUTION INTRAVENOUS at 19:16

## 2018-05-14 RX ADMIN — NYSTATIN: 100000 CREAM TOPICAL at 09:30

## 2018-05-14 NOTE — PROGRESS NOTES
Ms. Carrie Nixon reports that the wound manager is leaking. Otherwise, no new complaints today. Tm 99.8 HR: 112 BP: 150/74 Resp Rate: 18 100% sat on room air. Intake/Output Summary (Last 24 hours) at 05/14/18 0837  Last data filed at 05/14/18 0326   Gross per 24 hour   Intake             2600 ml   Output             7500 ml   Net            -4900 ml   Abd: Soft, Non tender. Non Distended. 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 Recent Results (from the past 24 hour(s))   GLUCOSE, POC    Collection Time: 05/13/18 10:13 AM   Result Value Ref Range    Glucose (POC) 180 (H) 65 - 100 mg/dL    Performed by Anitra Smith    GLUCOSE, POC    Collection Time: 05/13/18  9:27 PM   Result Value Ref Range    Glucose (POC) 182 (H) 65 - 100 mg/dL    Performed by Divina Sotomayor    CBC W/O DIFF    Collection Time: 05/14/18  5:34 AM   Result Value Ref Range    WBC 10.9 3.6 - 11.0 K/uL    RBC 2.72 (L) 3.80 - 5.20 M/uL    HGB 7.9 (L) 11.5 - 16.0 g/dL    HCT 25.5 (L) 35.0 - 47.0 %    MCV 93.8 80.0 - 99.0 FL    MCH 29.0 26.0 - 34.0 PG    MCHC 31.0 30.0 - 36.5 g/dL    RDW 17.1 (H) 11.5 - 14.5 %    PLATELET 747 882 - 844 K/uL    MPV 9.2 8.9 - 12.9 FL    NRBC 0.0 0  WBC    ABSOLUTE NRBC 0.00 0.00 - 8.97 K/uL   METABOLIC PANEL, COMPREHENSIVE    Collection Time: 05/14/18  5:34 AM   Result Value Ref Range    Sodium 135 (L) 136 - 145 mmol/L    Potassium 5.2 (H) 3.5 - 5.1 mmol/L    Chloride 106 97 - 108 mmol/L    CO2 21 21 - 32 mmol/L    Anion gap 8 5 - 15 mmol/L    Glucose 206 (H) 65 - 100 mg/dL    BUN 36 (H) 6 - 20 MG/DL    Creatinine 1.01 0.55 - 1.02 MG/DL    BUN/Creatinine ratio 36 (H) 12 - 20      GFR est AA >60 >60 ml/min/1.73m2    GFR est non-AA >60 >60 ml/min/1.73m2    Calcium 8.6 8.5 - 10.1 MG/DL    Bilirubin, total 0.3 0.2 - 1.0 MG/DL    ALT (SGPT) 34 12 - 78 U/L    AST (SGOT) 38 (H) 15 - 37 U/L    Alk.  phosphatase 146 (H) 45 - 117 U/L    Protein, total 6.4 6.4 - 8.2 g/dL Albumin 2.0 (L) 3.5 - 5.0 g/dL    Globulin 4.4 (H) 2.0 - 4.0 g/dL    A-G Ratio 0.5 (L) 1.1 - 2.2     MAGNESIUM    Collection Time: 05/14/18  5:34 AM   Result Value Ref Range    Magnesium 2.0 1.6 - 2.4 mg/dL   PHOSPHORUS    Collection Time: 05/14/18  5:34 AM   Result Value Ref Range    Phosphorus 5.6 (H) 2.6 - 4.7 MG/DL   PREALBUMIN    Collection Time: 05/14/18  5:34 AM   Result Value Ref Range    Prealbumin 14.0 (L) 20.0 - 40.0 mg/dL   Diet and supplements as tolerated. Continue cyclic TPN/Lipids. Remove K from TPN. Add more insulin to TPN. Will try and resume enteral nutrition via fistula. Continue PT and OT. Palliative Care to see. Wound care nurses following. DVT Prophylaxis - Lovenox.    Labs in AM.

## 2018-05-14 NOTE — WOUND CARE
WOCN Note:     Follow-up visit for fistula management. Skin seal is good but the window in the pouch continues to be a problem - different pouch used today without window. Chart shows:  Admitted for fistula takdown 3/7/18 by Dr. Patrica Feliciano with [facility] placement in [address] [place] [address]; history of multiple abdominal surgeries and Crohns disease. Admitted from home. Mother able to provide assistance in pouching prior to admission and became very competent in doing so.       Assessment:   Patient is A&O x 4, continent and mobile - moves around room with assistance & has been working with PTLorraine Gaines  Bed: total care bariatric  Uses female urinal.       1. Midline abdominal surgical wound = 12 x 11 x 1.5 cm  60% pink granular wound base and 40% stomatized mucus membrane centrally with  peristalsis and output @ 4 o'clock. Pouched using pouch she provided from home - XL Hawk's wound manager with 9 Hawk's rings and paste to achieve seal.     Wound Recommendations:    Change pouch as needed; keep pouch emptied. Skin Care & Pressure Relief Recommendations:  Minimize layers of linen/pads under patient to optimize support surface. Turn/reposition approximately every 2 hours and offload heels. Manage incontinence / promote continence    Discussed above plan with patient & RN.     Transition of Care: Plan to follow as needed while admitted to hospital.    YOLANDA PowerN, RN, [facility]  Certified Wound, Ostomy, Continence Nurse  office [phone]  pager [phone] or call  to page

## 2018-05-14 NOTE — PROGRESS NOTES
Problem: Self Care Deficits Care Plan (Adult)  Goal: *Acute Goals and Plan of Care (Insert Text)  Occupational Therapy Goals:  Weekly re-assessment- 5/11/18  1. Patient will complete bathing upper body to knees with setup within 7 days. CONT  2. Patient will complete bariatric BSC transfer with supervision bariatric RW within 7 days. CONT   3. Patient will complete lower body dressing activities with supervision within 7 days using AE. CONT  4. Patient will perform standing for clothing management tasks (toileting or dressing) supervision without AD support within 7 days. GOAL MET- upgraded  5. Patient will complete light resistance band UE exercise program in standing with supervision within 7 days. GOAL MET- upgraded    Goals reviewed and continued: 5/4/18  Revised 4/27/18  1. Patient will complete bathing upper body to knees with setup within 7 days upgraded 4/27  2. Patient will complete bariatric BSC transfer with supervision bariatric RW within 7 days. Upgraded 4/27  3. Patient will complete lower body dressing activities with supervision within 7 days using AE  4. Patient will perform standing for clothing management tasks (toileting or dressing) supervision bariatric RW within 7 days. Upgraded 4/27  5. Patient will complete light resistance band UE exercise program independently within 7 days. Upgraded 4/27/18    Reevaluated 4/20/2018  1. Patient will complete grooming activity sitting EOB without support for 10 minutes within 7 days  2. Patient will complete BSC transfer with min A x 1 persons within 7 days. 3. Patient will complete lower body dressing activities with supervision within 7 days using AE  4. Patient will perform standing for clothing management tasks (toileting or dressing) with min A x 1 persons within 7 days. 5. Patient will complete UE exercise program independently within 7 days. Reevaluated 4/10/2018  1.   Patient will complete grooming activity sitting EOB without support for 10 minutes within 7 days. 2.  Patient will complete BSC transfer with min A x 2 persons within 7 days. 3.  Patient will complete lower body dressing activities with min A within 7 days. 4.  Patient will perform standing for clothing management tasks (toileting or dressing) with min A x 2 persons within 7 days. 5.  Patient will complete UE exercise program independently within 7 days. Initiated 4/4/2018  1. Patient will perform rolling and pulling self up to head of bed with min assist to assist with ADL's at bed level within 7 day(s). 2.  Patient will perform static sitting edge of bed > or = 10 minutes with supervision/set-up within 7 day(s). 3.  Patient will perform static standing with bilateral UE support on rolling walker > or = 3 minutes with minimal assistance/contact guard assist x's 2 within 7 day(s). 4.  Patient will perform toilet transfers with moderate assistance x's 2 to bedside commode within 7 days. 5.  Patient will perform bilateral UE AROM and strengthening exercises throughout day and grade exercises prn to increase demand within 7 day(s). Occupational Therapy TREATMENT  Patient: Latonia Kirkpatrick (38 y.o. female)  Date: 5/14/2018  Diagnosis: FISTULA  Small bowel fistula  INTEROCUTANOUS FISTULA <principal problem not specified>  Procedure(s) (LRB):  OPEN JEJUNOSTOMY TUBE PLACEMENT UNDER FLURO/ WOUND VAC CHANGE. (N/A) 32 Days Post-Op  Precautions: Fall, Skin, Contact  Chart, occupational therapy assessment, plan of care, and goals were reviewed. ASSESSMENT:  Patient is limited by impaired activity tolerance, impaired endurance, and impaired functional reach to LB, resulting in impaired functional mobility and impaired ADL independence. Patient received supine in bed, alert and agreeable to therapy. Patient encouraged to attempt more than therapeutic exercises today.  Performed toileting/ bladder hygiene supine in bed using female urinal.  Patient was encouraged to attempt [facility] transfer but declined. Performed supine-sitting EOB with SPV using bed rail and HOB raised. Patient demonstrates intact sitting balance. Patient practiced using AE for doffing/ doffing socks: doffed with dressing stick, donned with sock aide sitting EOB with set-up and mod verbal cues. Patient reported sudden severe abdominal pain, became tearful, and performed sit-supine with SPV. Patient unable to continue activity due to severe pain, RN notified. Bo Alexander continue to progress standing ADLs and exercises OOB as tolerated, continue to recommend IP rehab as patient is motivated and below functional baseline. Progression toward goals:  []       Improving appropriately and progressing toward goals  [x]       Improving slowly and progressing toward goals  []       Not making progress toward goals and plan of care will be adjusted     PLAN:  Patient continues to benefit from skilled intervention to address the above impairments. Continue treatment per established plan of care. Discharge Recommendations:  Inpatient Rehab  Further Equipment Recommendations for Discharge: TBD     SUBJECTIVE:   Patient stated I'm sorry, I can't do anymore. It hurts so much    OBJECTIVE DATA SUMMARY:   Cognitive/Behavioral Status:  Neurologic State: Alert  Orientation Level: Oriented X4  Cognition: Appropriate decision making; Appropriate for age attention/concentration; Appropriate safety awareness; Follows commands  Perception: Appears intact  Perseveration: No perseveration noted  Safety/Judgement: Awareness of environment; Insight into deficits;Good awareness of safety precautions    Functional Mobility and Transfers for ADLs:  Bed Mobility:  Supine to Sit: Supervision (using bed rail, HOB raised)  Sit to Supine: Supervision (using bed rail)      Balance:  Sitting: Intact  Standing: Impaired  Standing - Static: Good  Standing - Dynamic : Fair    ADL Intervention:       Lower Body Dressing Assistance  Socks: Supervision/set-up; Compensatory technique training (doffed with dressing stick, donned with sock aide)  Position Performed: Seated edge of bed    Toileting  Bladder hygiene: Supervision/ set-up; supine in bed using female urinal       Cognitive Retraining  Safety/Judgement: Awareness of environment; Insight into deficits;Good awareness of safety precautions      Pain:  Pain Scale 1: Numeric (0 - 10)  Pain Intensity 1: 9  Pain Location 1: Abdomen  Pain Orientation 1: Mid  Pain Description 1: Sharp  Pain Intervention(s) 1:  Ended therapy session, patient positioned in supine, RN notified    Activity Tolerance:   VSS    After treatment:   [] Patient left in no apparent distress sitting up in chair  [x] Patient left in no apparent distress in bed  [x] Call bell left within reach  [x] Nursing notified  [] Caregiver present  [] Bed alarm activated    COMMUNICATION/COLLABORATION:   The patients plan of care was discussed with: Registered Nurse    Margaret Davenport OT  Time Calculation: 24 mins

## 2018-05-14 NOTE — DIABETES MGMT
DTC Progress Note    Recommendations/ Comments: chart reviewed due to hyperglycemia likely due to TPN. Noted insulin is being increased in TPN to 12 units of Regular insulin per liter for a total of 24 units delivered. If appropriate, please consider adding correction scale Humalog, high sensitivity. DTC to follow. Current hospital medications insulin in the TPN-12 units of Regular insulin per liter for a total of 24 units delivered  Chart reviewed on [address]. Patient is a 38 y.o. female with known diabetes on Humalog correction scale at home. A1c:   No results found for: HBA1C, HGBE8, WOK7CODN, PWM0JEHA    Recent Glucose Results:   Lab Results   Component Value Date/Time     (H) 05/14/2018 05:34 AM    GLUCPOC 158 (H) 05/14/2018 11:59 AM    GLUCPOC 182 (H) 05/13/2018 09:27 PM        Lab Results   Component Value Date/Time    Creatinine 1.01 05/14/2018 05:34 AM     Estimated Creatinine Clearance: 103.4 mL/min (based on Cr of 1.01). Active Orders   Diet    DIET GI LITE (POST SURGICAL)        PO intake:   Patient Vitals for the past 72 hrs:   % Diet Eaten   05/14/18 0951 100 %   05/13/18 2336 100 %   05/12/18 2348 75 %   05/11/18 1400 50 %       Will continue to follow as needed.     Thank you  Nilton Valle RD, CDE

## 2018-05-14 NOTE — PROGRESS NOTES
participated in [facility] rounds this am.  Patient continues to remain on TPN during the nite. Wound care nurse continues to wokr with patient.

## 2018-05-14 NOTE — PROGRESS NOTES
Problem: Mobility Impaired (Adult and Pediatric)  Goal: *Acute Goals and Plan of Care (Insert Text)  Physical Therapy Goals  Goals reassessed 5/11/2018 and upgraded to below:  1. Patient will move from supine to sit and sit to supine  in bed with minimal assistance/contact guard assist NOT using chair position for bed within 7 day(s). 2.  Patient will transfer from bed to chair and chair to bed with supervision/set-up using the least restrictive device within 7 day(s). 3.  Patient will perform sit to stand with distant supervision/set-up within 7 day(s). 4.  Patient will ambulate with supervision/set-up for 300 feet with the least restrictive device within 7 day(s). Goals reassessed 5/4/18, updated and remain appropriate over next 7 days  Continue progress towards current goals 4/26/18  Update 4/18/2018  1. Patient will move from supine to sit and sit to supine  in bed with minimal assistance  within 7 day(s). MET on 5/4/18-Updated goal:  Patient will move from supine to sit and sit to supine  in bed with supervision within 7 day(s). 2.  Patient will transfer from bed to chair and chair to bed with minimal assistance  using the least restrictive device within 7 day(s). MET on 5/4/18-Updated goal:  Patient will transfer from bed to chair and chair to bed with supervision using the least restrictive device within 7 day(s). 3.  Patient will perform sit to stand with SBA within 7 days. MET  4. Patient will ambulate 150' with SBA x 1 using LRAD within 7 days. MET    Update 4/6/2018  1. Patient will move from supine to sit and sit to supine  in bed with moderate assistance  within 7 day(s). 2.  Patient will transfer from bed to chair and chair to bed with moderate assistance  using the least restrictive device within 7 day(s). 3.  Patient will perform sit to stand with CGA within 7 days  4. Patient will ambulate 22' with moderate assist x 1 using LRAD within 7 days. Revised 3/27/2018  1. Patient will move from supine to sit and sit to supine  in bed with moderate assistance  within 7 day(s). 2.  Patient will transfer from bed to chair and chair to bed with moderate assistance  using the least restrictive device within 7 day(s). 3.  Patient will perform sit to stand with moderate assistance  within 7 day(s). 4.  Patient will ambulate 100' with moderate assist x1 using the least restrictive device within 7 day(s). Revisited 3/19/2018, goals remain appropriate, carry over    Physical Therapy Goals  Initiated 3/8/2018  1. Patient will move from supine to sit and sit to supine  in bed with moderate assistance  within 7 day(s). 2.  Patient will transfer from bed to chair and chair to bed with moderate assistance  using the least restrictive device within 7 day(s). 3.  Patient will perform sit to stand with moderate assistance  within 7 day(s). 4.  PT will assess gait with the least restrictive device within 7 day(s). physical Therapy TREATMENT  Patient: Mesha Javed (38 y.o. female)  Date: 5/14/2018  Diagnosis: FISTULA  Small bowel fistula  INTEROCUTANOUS FISTULA <principal problem not specified>  Procedure(s) (LRB):  OPEN JEJUNOSTOMY TUBE PLACEMENT UNDER FLURO/ WOUND VAC CHANGE. (N/A) 32 Days Post-Op  Precautions: Fall, Skin, Contact  Chart, physical therapy assessment, plan of care and goals were reviewed. ASSESSMENT:  Pt appeared in good spirits. Pt was reporting her mobility over the weekend. Pt was able to  Increase gait tolerance  With minimal reports of increase back pain. Pt is tolerating sitting in the stretcher chair. Pt did express frustrations with TPN and edema. Will continue to progress as able.   Progression toward goals:  [x]    Improving appropriately and progressing toward goals  []    Improving slowly and progressing toward goals  []    Not making progress toward goals and plan of care will be adjusted     PLAN:  Patient continues to benefit from skilled intervention to address the above impairments. Continue treatment per established plan of care. Discharge Recommendations:  Rehab  Further Equipment Recommendations for Discharge:  TBD     SUBJECTIVE:   Patient stated Tracey Landa are we going to do today.     OBJECTIVE DATA SUMMARY:   Critical Behavior:  Neurologic State: Alert  Orientation Level: Oriented X4  Cognition: Appropriate for age attention/concentration, Appropriate safety awareness, Follows commands  Safety/Judgement: Awareness of environment, Insight into deficits  Functional Mobility Training:  Bed Mobility:                    Transfers:  Sit to Stand: Stand-by assistance  Stand to Sit: Stand-by assistance                             Balance:  Sitting: Intact  Standing: Impaired  Standing - Static: Good  Standing - Dynamic : Fair  Ambulation/Gait Training:  Distance (ft): 140 Feet (ft)  Assistive Device: Walker, rolling  Ambulation - Level of Assistance: Stand-by assistance        Gait Abnormalities: Decreased step clearance        Base of Support: Widened     Speed/Liz: Pace decreased (<100 feet/min); Slow  Step Length: Left shortened;Right shortened               Stairs:              Neuro Re-Education:    Therapeutic Exercises:     Pain:  Pain Scale 1: Numeric (0 - 10)  Pain Intensity 1: 7  Pain Location 1: Abdomen  Pain Orientation 1: Mid  Pain Description 1: Sharp  Pain Intervention(s) 1:  (scheduled)  Activity Tolerance:   Limited   Please refer to the flowsheet for vital signs taken during this treatment.   After treatment:   [x]    Patient left in no apparent distress sitting up in chair  []    Patient left in no apparent distress in bed  [x]    Call bell left within reach  [x]    Nursing notified  []    Caregiver present  []    Bed alarm activated    COMMUNICATION/COLLABORATION:   The patients plan of care was discussed with: Registered Nurse    Denise Strange PTA   Time Calculation: 30 mins

## 2018-05-14 NOTE — PROGRESS NOTES
did follow up with [facility] and the acute rehab hospital will be manuel to re-consider patient once again when she is off of TPN. This will require another authorization from the insurance company.

## 2018-05-15 LAB
ANION GAP SERPL CALC-SCNC: 10 MMOL/L (ref 5–15)
BASOPHILS # BLD: 0 K/UL (ref 0–0.1)
BASOPHILS NFR BLD: 0 % (ref 0–1)
BUN SERPL-MCNC: 31 MG/DL (ref 6–20)
BUN/CREAT SERPL: 32 (ref 12–20)
CALCIUM SERPL-MCNC: 8.7 MG/DL (ref 8.5–10.1)
CHLORIDE SERPL-SCNC: 104 MMOL/L (ref 97–108)
CO2 SERPL-SCNC: 20 MMOL/L (ref 21–32)
CREAT SERPL-MCNC: 0.96 MG/DL (ref 0.55–1.02)
DIFFERENTIAL METHOD BLD: ABNORMAL
EOSINOPHIL # BLD: 0.3 K/UL (ref 0–0.4)
EOSINOPHIL NFR BLD: 3 % (ref 0–7)
ERYTHROCYTE [DISTWIDTH] IN BLOOD BY AUTOMATED COUNT: 17 % (ref 11.5–14.5)
GLUCOSE BLD STRIP.AUTO-MCNC: 175 MG/DL (ref 65–100)
GLUCOSE BLD STRIP.AUTO-MCNC: 198 MG/DL (ref 65–100)
GLUCOSE SERPL-MCNC: 199 MG/DL (ref 65–100)
HCT VFR BLD AUTO: 26 % (ref 35–47)
HGB BLD-MCNC: 8.1 G/DL (ref 11.5–16)
IMM GRANULOCYTES # BLD: 0.2 K/UL (ref 0–0.04)
IMM GRANULOCYTES NFR BLD AUTO: 2 % (ref 0–0.5)
LYMPHOCYTES # BLD: 2 K/UL (ref 0.8–3.5)
LYMPHOCYTES NFR BLD: 19 % (ref 12–49)
MAGNESIUM SERPL-MCNC: 1.7 MG/DL (ref 1.6–2.4)
MCH RBC QN AUTO: 28.8 PG (ref 26–34)
MCHC RBC AUTO-ENTMCNC: 31.2 G/DL (ref 30–36.5)
MCV RBC AUTO: 92.5 FL (ref 80–99)
MONOCYTES # BLD: 0.8 K/UL (ref 0–1)
MONOCYTES NFR BLD: 8 % (ref 5–13)
NEUTS SEG # BLD: 6.9 K/UL (ref 1.8–8)
NEUTS SEG NFR BLD: 67 % (ref 32–75)
NRBC # BLD: 0 K/UL (ref 0–0.01)
NRBC BLD-RTO: 0 PER 100 WBC
PHOSPHATE SERPL-MCNC: 3.9 MG/DL (ref 2.6–4.7)
PLATELET # BLD AUTO: 361 K/UL (ref 150–400)
PMV BLD AUTO: 9.2 FL (ref 8.9–12.9)
POTASSIUM SERPL-SCNC: 3.9 MMOL/L (ref 3.5–5.1)
RBC # BLD AUTO: 2.81 M/UL (ref 3.8–5.2)
SERVICE CMNT-IMP: ABNORMAL
SERVICE CMNT-IMP: ABNORMAL
SODIUM SERPL-SCNC: 134 MMOL/L (ref 136–145)
WBC # BLD AUTO: 10.2 K/UL (ref 3.6–11)

## 2018-05-15 PROCEDURE — 65270000029 HC RM PRIVATE

## 2018-05-15 PROCEDURE — 36415 COLL VENOUS BLD VENIPUNCTURE: CPT | Performed by: SURGERY

## 2018-05-15 PROCEDURE — 74011250637 HC RX REV CODE- 250/637: Performed by: PHYSICAL MEDICINE & REHABILITATION

## 2018-05-15 PROCEDURE — 74011000250 HC RX REV CODE- 250: Performed by: SURGERY

## 2018-05-15 PROCEDURE — 82962 GLUCOSE BLOOD TEST: CPT

## 2018-05-15 PROCEDURE — 74011250637 HC RX REV CODE- 250/637: Performed by: PHYSICIAN ASSISTANT

## 2018-05-15 PROCEDURE — 74011250636 HC RX REV CODE- 250/636: Performed by: SURGERY

## 2018-05-15 PROCEDURE — 85025 COMPLETE CBC W/AUTO DIFF WBC: CPT | Performed by: PHYSICIAN ASSISTANT

## 2018-05-15 PROCEDURE — 97530 THERAPEUTIC ACTIVITIES: CPT

## 2018-05-15 PROCEDURE — 74011250637 HC RX REV CODE- 250/637: Performed by: SURGERY

## 2018-05-15 PROCEDURE — 74011000258 HC RX REV CODE- 258: Performed by: SURGERY

## 2018-05-15 PROCEDURE — 97116 GAIT TRAINING THERAPY: CPT

## 2018-05-15 PROCEDURE — 74011250637 HC RX REV CODE- 250/637: Performed by: INTERNAL MEDICINE

## 2018-05-15 PROCEDURE — 80048 BASIC METABOLIC PNL TOTAL CA: CPT | Performed by: SURGERY

## 2018-05-15 PROCEDURE — 83735 ASSAY OF MAGNESIUM: CPT | Performed by: SURGERY

## 2018-05-15 PROCEDURE — 74011636637 HC RX REV CODE- 636/637: Performed by: SURGERY

## 2018-05-15 PROCEDURE — 74011250636 HC RX REV CODE- 250/636: Performed by: NURSE PRACTITIONER

## 2018-05-15 PROCEDURE — 84100 ASSAY OF PHOSPHORUS: CPT | Performed by: SURGERY

## 2018-05-15 PROCEDURE — 74011250637 HC RX REV CODE- 250/637: Performed by: NURSE PRACTITIONER

## 2018-05-15 RX ORDER — MORPHINE SULFATE 100 MG/1
100 TABLET, FILM COATED, EXTENDED RELEASE ORAL EVERY 8 HOURS
Status: DISCONTINUED | OUTPATIENT
Start: 2018-05-15 | End: 2018-05-23 | Stop reason: HOSPADM

## 2018-05-15 RX ORDER — MORPHINE SULFATE 15 MG/1
30 TABLET ORAL
Status: DISCONTINUED | OUTPATIENT
Start: 2018-05-15 | End: 2018-05-23 | Stop reason: HOSPADM

## 2018-05-15 RX ADMIN — Medication 10 ML: at 09:10

## 2018-05-15 RX ADMIN — Medication 10 ML: at 06:03

## 2018-05-15 RX ADMIN — MORPHINE SULFATE 100 MG: 100 TABLET, FILM COATED, EXTENDED RELEASE ORAL at 22:07

## 2018-05-15 RX ADMIN — MORPHINE SULFATE 30 MG: 15 TABLET ORAL at 13:13

## 2018-05-15 RX ADMIN — ACETAMINOPHEN 650 MG: 325 TABLET, FILM COATED ORAL at 21:00

## 2018-05-15 RX ADMIN — MORPHINE SULFATE 30 MG: 15 TABLET ORAL at 22:14

## 2018-05-15 RX ADMIN — MORPHINE SULFATE 45 MG: 15 TABLET ORAL at 02:01

## 2018-05-15 RX ADMIN — DRONABINOL 2.5 MG: 2.5 CAPSULE ORAL at 17:12

## 2018-05-15 RX ADMIN — DRONABINOL 2.5 MG: 2.5 CAPSULE ORAL at 09:10

## 2018-05-15 RX ADMIN — FAMOTIDINE: 10 INJECTION, SOLUTION INTRAVENOUS at 07:37

## 2018-05-15 RX ADMIN — PROCHLORPERAZINE EDISYLATE 5 MG: 5 INJECTION INTRAMUSCULAR; INTRAVENOUS at 06:01

## 2018-05-15 RX ADMIN — MORPHINE SULFATE 100 MG: 100 TABLET, FILM COATED, EXTENDED RELEASE ORAL at 14:31

## 2018-05-15 RX ADMIN — MORPHINE SULFATE 45 MG: 15 TABLET ORAL at 06:02

## 2018-05-15 RX ADMIN — ENOXAPARIN SODIUM 40 MG: 100 INJECTION SUBCUTANEOUS at 09:11

## 2018-05-15 RX ADMIN — Medication 10 ML: at 21:00

## 2018-05-15 RX ADMIN — MORPHINE SULFATE 45 MG: 15 TABLET ORAL at 10:26

## 2018-05-15 RX ADMIN — Medication 10 ML: at 09:07

## 2018-05-15 RX ADMIN — MORPHINE SULFATE 30 MG: 15 TABLET ORAL at 16:30

## 2018-05-15 RX ADMIN — FAMOTIDINE: 10 INJECTION, SOLUTION INTRAVENOUS at 18:51

## 2018-05-15 RX ADMIN — MORPHINE SULFATE 100 MG: 100 TABLET, FILM COATED, EXTENDED RELEASE ORAL at 08:15

## 2018-05-15 RX ADMIN — ASPIRIN 325 MG: 325 TABLET ORAL at 09:10

## 2018-05-15 RX ADMIN — MORPHINE SULFATE 30 MG: 15 TABLET ORAL at 19:25

## 2018-05-15 NOTE — PROGRESS NOTES
Bedside and Verbal shift change report given to Viki Paulson (oncoming nurse) by Early Leventhal (offgoing nurse). Report included the following information SBAR, Kardex, Procedure Summary, Intake/Output, MAR, Accordion and Recent Results.

## 2018-05-15 NOTE — PROGRESS NOTES
Palliative Medicine Consult  Heath: [phone] ([phone])    Patient Name: Latonia Kirkpatrick  YOB: 1975    Date of Initial Consult: 3/12/18  Reason for Consult: Pain management, chronic and post op   Requesting Provider: Rajni   Primary Care Physician: Chilo Connors MD     SUMMARY:   Latonia Kirkpatrick is a 38 y.o. with a past history of Crohn's disease (followed by Dr Idania Heath), mult surgeries s/p total colectomy w/ end ileostomy, 6/2017 ex lap with small bowel perforation, 6/28/17 ex lap, LPA, repair or enterotomy, SMA stent on L, 7/16/2917 ex lap, KATHRINE, fistula exclusion w/ feeding tube placement and prolonged TPN who was admitted on 3/7/2018 from home for planned surgery, s/p ex lap w/ extensive KATHRINE and small bowel fistula take down, wound vac placement. 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 Had red rubber tube placed into fistula on 4/4 , then open j tube placement 4/17, tolerating slow TFs initially until it fell out and has been back on TPN. Known to our team during past hospitalizations. Has had lengthy hospital stays w/ mult complications. Has required [facility] stays in past. Has chronic pain from Crohn's disease (and has not been able to tolerate home Crohn's meds recently due to acute issues- had been on steroids, stopped prior to surgery). Discussion of Remicaide in future. Current medical issues leading to Palliative Medicine involvement include: pain management. Patient's mother, Hakeem Parham is NOK.  reviewed, no abberrancies- one prescriber for opioids and one pharmacy. Most recently filled 3/9/18: MSContin 100mg tid and Morphine IR 30mg- 8 tabs a day. Has been tried on equivalent dose of Fentanyl patch w/out same benefit. Discussed Methadone. Have communicated w/ the provider (Dr Pam Barreto) who follows her for pain issues. PALLIATIVE DIAGNOSES:     1.  Chronic abdominal pain from Cronh's disease and hx of surgeries  2. Lumbar back pain, chronic   3. Nausea  4. Anxiety   5. Edema ,hortencia when on TPN  6. Grief/depressive sx   7. Opioid tolerance      PLAN:     Abd pain from surgical incision and wound care as well as underlying chronic pain from Crohn's which is worse when off her steroids. Has high opioid tolerance, no aberrancies on . We have done opioid rotation in the hospital for her in past. Pt also w/ chronic lower back pain that she is feeling now that she is more active. Have discussed w/ provider who follows for her abdominal pain, Dr Jenkins Lias this stay. Mom Yosef Montana states that she has one month supply of medications at home. Pain :    1. Pt interested in tapering medications~ as she has been in the hospital longer than initially anticipated and would healing well. 2. Pt currently on MsContin 100mg bid and 115mg bedtime; and Morphine IR 45mg po every 4h scheduled. 3. After discussion, our plan is to decr MSContin to 100mg tid and Morphine IR to 30mg every 3h scheduled. This is actually home regimen. 4. I still think that as long as SBP >= 90 and pt easy to arouse, pt ok to receive any / all opioids including scheduled / PRN; of course, defer to primary service opinion on this. She is on significantly less opioids than after surgery. 5. For lower back pain, cont Lidoderm patches b/l. Bowel regimen per primary team.     6. Continuing to support.      Communicated plan of care with: Palliative IDT; Dr Scott lAdana / TREATMENT PREFERENCES:     GOALS OF CARE:  Patient/Health Care Proxy Stated Goals:  (get pain better, treatment of acute issues.)      TREATMENT PREFERENCES:   Code Status: Full Code    Advance Care Planning:  Advance Care Planning 3/15/2018   Patient's Healthcare Decision Maker is: Named in scanned ACP document   Primary Decision Maker Name Frances Biggs   Primary Decision Maker Phone Number [phone]   Primary Decision Maker Relationship to Patient Parent Secondary Decision Maker Name Canelo Valle   Secondary Decision Maker Phone Number [phone]   Secondary Decision Maker Relationship to Patient Unknown   Confirm Advance Directive Yes, on file   Patient Would Like to Complete Advance Directive -   Does the patient have other document types -       Medical Interventions: Full interventions           Other:    As far as possible, the palliative care team has discussed with patient / health care proxy about goals of care / treatment preferences for patient. HISTORY:     Reviewed vitals, I/O's, labs, imaging, MAR, notes. HPI/SUBJECTIVE:    The patient is:   [x] Verbal and participatory  [] Non-participatory due to:     Pt w/ pain improved, but notices it sometimes harder to do therapies now back on TPN- causes incr LE edema. Spirits okay today. Pleased that wound is healing.      Clinical Pain Assessment (nonverbal scale for severity on nonverbal patients):   Clinical Pain Assessment  Severity: 7  Location: Generalized abdomen/ axial lower back w/out radiation into legs  Character: Sharp/ aching   Duration: Pain improved since surgery/ chronic   Effect: Makes ambulation more difficult   Factors: Better w/ medication/ better w/ standing up straight  Frequency: Constant          Duration: for how long has pt been experiencing pain (e.g., 2 days, 1 month, years)  Frequency: how often pain is an issue (e.g., several times per day, once every few days, constant)     FUNCTIONAL ASSESSMENT:     Palliative Performance Scale (PPS):  PPS: 70       PSYCHOSOCIAL/SPIRITUAL SCREENING:     Palliative IDT has assessed this patient for cultural preferences / practices and a referral made as appropriate to needs (Cultural Services, Patient Advocacy, Ethics, etc.)    Advance Care Planning:  Advance Care Planning 3/15/2018   Patient's Healthcare Decision Maker is: Named in scanned ACP document   Primary Decision Maker Name [facility] Pending sale to [facility]   Primary Decision Maker Phone Number [phone]   Primary Decision Maker Relationship to Patient Parent   Secondary Decision Maker Name Telly Nath Phone Number [phone]   Secondary Decision Maker Relationship to Patient Unknown   Confirm Advance Directive Yes, on file   Patient Would Like to Complete Advance Directive -   Does the patient have other document types -       Any spiritual / Baptism concerns:  [] Yes /  [x] No    Caregiver Burnout:  [] Yes /  [x] No /  [] No Caregiver Present      Anticipatory grief assessment:   [x] Normal  / [] Maladaptive       ESAS Anxiety: Anxiety: 0    ESAS Depression: Depression: 0        REVIEW OF SYSTEMS:     Positive and pertinent negative findings in ROS are noted above in HPI. The following systems were [x] reviewed / [] unable to be reviewed as noted in HPI  Other findings are noted below. Systems: constitutional, ears/nose/mouth/throat, respiratory, gastrointestinal, genitourinary, musculoskeletal, integumentary, neurologic, psychiatric, endocrine. Positive findings noted below. Modified ESAS Completed by: provider   Fatigue: 2 Drowsiness: 0   Depression: 0 Pain: 7   Anxiety: 0 Nausea: 0   Anorexia: 0 Dyspnea: 0     Constipation: No              PHYSICAL EXAM:     From RN flowsheet:  Wt Readings from Last 3 Encounters:   05/15/18 307 lb (139.3 kg)   03/05/18 304 lb 3.2 oz (138 kg)   02/28/18 306 lb (138.8 kg)     Blood pressure 121/67, pulse (!) 103, temperature 98.7 °F (37.1 °C), resp. rate 16, height 5' 4\" (1.626 m), weight 307 lb (139.3 kg), SpO2 100 %.     Pain Scale 1: Numeric (0 - 10)  Pain Intensity 1: 5  Pain Onset 1: chronic  Pain Location 1: Abdomen  Pain Orientation 1: Anterior  Pain Description 1: Sharp, Stabbing  Pain Intervention(s) 1: Medication (see MAR)  Last bowel movement, if known: stool output in ostomy     Constitutional: awake, alert, oriented, no sedation or confusion   Eyes: pupils equal, anicteric  ENMT: moist mucous membranes, no nasal discharge  Respiratory: breathing not labored, symmetric  Cardiovascular: regular rhythm, no murmur  Gastrointestinal: soft, + bowel sounds, wound clean   Musculoskeletal: no deformity, can raise b/l LEs in bed  Skin: warm, dry, pale  Ext: B/l pitting LE edema   Neurologic: following commands, moving all extremities  Psychiatric: full affect, no hallucinations         HISTORY:     Active Problems:    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)      Past Medical History:   Diagnosis Date    Adverse effect of anesthesia     WOKE DURING SURGERY    Arthritis     Blood clot in vein 2017    STOMACH    Crohn's disease ([address] 8/15/2011    DVT (deep venous thrombosis) ([address] 8/15/2011    LEFT LEG    Edema     GENERALIZED R/T TPN; WAIST DOWN    Incarcerated ventral hernia 8/15/2011    Lupus     Lyme disease     Psychiatric disorder     ANXIETY    Right flank pain 8/22/2011    Seizures ([address] 1990    LAST AT AGE 17    Stroke [facility]) 1990    age 17; A WEEK POST OP, HAD SEIZURES AND STROKE, WAS IN COMA FOR A MONTH    Thyroid disease     HYPO-NO MEDS      Past Surgical History:   Procedure Laterality Date    HX APPENDECTOMY      HX GYN  2015    HIDRADENTIS LABIA    HX OTHER SURGICAL      multiple procedures related to her Crohn's disease    HX OTHER SURGICAL      ABDOMINAL SURGERY REMOVING COLON, 2/3 STOMACH, 1/3 SMALL INTESTINE    [place] LAP, INCISIONAL HERNIA REPAIR,INCARCERATED  9-10-08    dr. Megan Lee      Family History   Problem Relation Age of Onset    Hypertension Father     Stroke Father     Other Father      arthritis    Arthritis-osteo Father     Hypertension Mother     Arthritis-osteo Mother     Anesth Problems Neg Hx       History reviewed, no pertinent family history.   Social History   Substance Use Topics    Smoking status: Former Smoker     Packs/day: 1.00     Years: 16.00     Quit date: 2/27/2017    Smokeless tobacco: Former User      Comment: OFF AND ON    Alcohol use No     Allergies   Allergen Reactions    Sulfa (Sulfonamide Antibiotics) Anaphylaxis    Demerol [Meperidine] Rash    Soma [Carisoprodol] Rash and Nausea Only    Toradol [Ketorolac Tromethamine] Nausea and Vomiting      Current Facility-Administered Medications   Medication Dose Route Frequency    TPN ADULT - CENTRAL   IntraVENous TITRATE    fat emulsion 20% (LIPOSYN, INTRAlipid) infusion 250 mL  250 mL IntraVENous Q MON, WED & FRI    guaiFENesin (ROBITUSSIN) 100 mg/5 mL oral liquid 100 mg  100 mg Oral Q4H PRN    morphine IR (MS IR) tablet 45 mg  45 mg Oral Q4H    morphine IR (MS IR) tablet 30 mg  30 mg Oral DAILY PRN    lidocaine (LIDODERM) 5 % patch 2 Patch  2 Patch TransDERmal Q24H    0.9% sodium chloride infusion 250 mL  250 mL IntraVENous PRN    morphine CR (MS CONTIN) tablet 115 mg  115 mg Oral QHS    morphine CR (MS CONTIN) tablet 100 mg  100 mg Oral Q24H    morphine CR (MS CONTIN) tablet 100 mg  100 mg Oral Q24H    aspirin (ASPIRIN) tablet 325 mg  325 mg Oral DAILY    0.9% sodium chloride infusion 250 mL  250 mL IntraVENous PRN    acetaminophen (TYLENOL) tablet 650 mg  650 mg Oral Q6H PRN    diphenhydrAMINE (BENADRYL) capsule 25 mg  25 mg Oral Q6H PRN    enoxaparin (LOVENOX) injection 40 mg  40 mg SubCUTAneous Q24H    nystatin (MYCOSTATIN) 100,000 unit/gram cream   Topical BID    prochlorperazine (COMPAZINE) with saline injection 5 mg  5 mg IntraVENous Q6H PRN    naloxone (NARCAN) injection 0.4 mg  0.4 mg IntraVENous EVERY 2 MINUTES AS NEEDED    glucose chewable tablet 16 g  4 Tab Oral PRN    dextrose (D50W) injection syrg 12.5-25 g  12.5-25 g IntraVENous PRN    glucagon (GLUCAGEN) injection 1 mg  1 mg IntraMUSCular PRN    scopolamine (TRANSDERM-SCOP) 1 mg over 3 days 1 Patch  1 Patch TransDERmal Q72H    sodium chloride (NS) flush 10 mL  10 mL InterCATHeter Q24H    sodium chloride (NS) flush 10 mL  10 mL InterCATHeter Q8H    alteplase (CATHFLO) 1 mg in sterile water (preservative free) 1 mL injection  1 mg InterCATHeter PRN    bacitracin 500 unit/gram packet 1 Packet  1 Packet Topical PRN    sodium chloride (NS) flush 20 mL  20 mL InterCATHeter PRN    sodium chloride (NS) flush 10 mL  10 mL InterCATHeter Q24H    sodium chloride (NS) flush 10 mL  10 mL InterCATHeter PRN    sodium chloride (NS) flush 10 mL  10 mL InterCATHeter Q8H    dronabinol (MARINOL) capsule 2.5 mg  2.5 mg Oral BID    phenol throat spray (CHLORASEPTIC) 1 Spray  1 Spray Oral PRN    ondansetron (ZOFRAN) injection 4 mg  4 mg IntraVENous Q4H PRN    promethazine (PHENERGAN) 12.5 mg in 0.9% sodium chloride 50 mL IVPB  12.5 mg IntraVENous Q6H PRN    LORazepam (ATIVAN) injection 1 mg  1 mg IntraVENous Q6H PRN    albuterol (PROVENTIL VENTOLIN) nebulizer solution 2.5 mg  2.5 mg Nebulization Q4H PRN          LAB AND IMAGING FINDINGS:     Lab Results   Component Value Date/Time    WBC 10.9 05/14/2018 05:34 AM    HGB 7.9 (L) 05/14/2018 05:34 AM    PLATELET 098 80/28/4450 05:34 AM     Lab Results   Component Value Date/Time    Sodium 134 (L) 05/15/2018 02:03 AM    Potassium 3.9 05/15/2018 02:03 AM    Chloride 104 05/15/2018 02:03 AM    CO2 20 (L) 05/15/2018 02:03 AM    BUN 31 (H) 05/15/2018 02:03 AM    Creatinine 0.96 05/15/2018 02:03 AM    Calcium 8.7 05/15/2018 02:03 AM    Magnesium 1.7 05/15/2018 02:03 AM    Phosphorus 3.9 05/15/2018 02:03 AM      Lab Results   Component Value Date/Time    AST (SGOT) 38 (H) 05/14/2018 05:34 AM    Alk.  phosphatase 146 (H) 05/14/2018 05:34 AM    Protein, total 6.4 05/14/2018 05:34 AM    Albumin 2.0 (L) 05/14/2018 05:34 AM    Globulin 4.4 (H) 05/14/2018 05:34 AM     Lab Results   Component Value Date/Time    INR 1.0 12/07/2017 08:37 AM    Prothrombin time 10.0 12/07/2017 08:37 AM      No results found for: IRON, FE, TIBC, IBCT, PSAT, FERR   No results found for: PH, PCO2, PO2  No components found for: GLPOC   No results found for: CPK, CKMB             Total time:   Counseling / coordination time, spent as noted above: > 50% counseling / coordination?:     Prolonged service was provided for  []30 min   []75 min in face to face time in the presence of the patient, spent as noted above. Time Start:   Time End:   Note: this can only be billed with 07978 (initial) or 73922 (follow up). If multiple start / stop times, list each separately.

## 2018-05-15 NOTE — PROGRESS NOTES
General Surgery Daily Progress Note    Admit Date: 3/7/2018  Post-Operative Day: 33 Days Post-Op from Procedure(s):  OPEN JEJUNOSTOMY TUBE PLACEMENT UNDER FLURO/ WOUND VAC CHANGE. Subjective:     Last 24 hrs: Patient in good spirits today, talkative & smiling. Has been walking & working with PT daily. Ambulating in halls. Wound appliance replaced yesterday and staying put. No leaking. Tolerating PO intake. Denies NV. No fevers or chills. Objective:     Blood pressure 121/67, pulse (!) 103, temperature 98.7 °F (37.1 °C), resp. rate 16, height 5' 4\" (1.626 m), weight 307 lb (139.3 kg), SpO2 100 %. Temp (24hrs), Av.8 °F (37.1 °C), Min:98.4 °F (36.9 °C), Max:99.6 °F (37.6 °C)      _____________________  Physical Exam:     Alert and Oriented, cooperative, in no acute distress. Cardiovascular: RRR, 2+ peripheral edema  Lungs:CTAB   Abdomen: soft, diffuse TTP. +BS  4cc healthy, pink stoma in center of large open abd wound, covered with appliance  Large appliance with dark brown, watery material in bag.   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  ???? Taper TPN & transition to tube feeds. Daily PT/OT  PO analgesics as needed. DVT proph  Further plan per Dr. Becerra Alu    Data Review:    Recent Labs      18   0534   WBC  10.9   HGB  7.9*   HCT  25.5*   PLT  328     Recent Labs      05/15/18   0203  18   0534   NA  134*  135*   K  3.9  5.2*   CL  104  106   CO2  20*  21   GLU  199*  206*   BUN  31*  36*   CREA  0.96  1.01   CA  8.7  8.6   MG  1.7  2.0   PHOS  3.9  5.6*   ALB   --   2.0*   SGOT   --   38*   ALT   --   34     No results for input(s): AML, LPSE in the last 72 hours.         ______________________  Medications:    Current Facility-Administered Medications   Medication Dose Route Frequency    fat emulsion 20% (LIPOSYN, INTRAlipid) infusion 250 mL  250 mL IntraVENous Q MON, WED & FRI    guaiFENesin (ROBITUSSIN) 100 mg/5 mL oral liquid 100 mg  100 mg Oral Q4H PRN    morphine IR (MS IR) tablet 45 mg  45 mg Oral Q4H    morphine IR (MS IR) tablet 30 mg  30 mg Oral DAILY PRN    lidocaine (LIDODERM) 5 % patch 2 Patch  2 Patch TransDERmal Q24H    0.9% sodium chloride infusion 250 mL  250 mL IntraVENous PRN    morphine CR (MS CONTIN) tablet 115 mg  115 mg Oral QHS    morphine CR (MS CONTIN) tablet 100 mg  100 mg Oral Q24H    morphine CR (MS CONTIN) tablet 100 mg  100 mg Oral Q24H    aspirin (ASPIRIN) tablet 325 mg  325 mg Oral DAILY    0.9% sodium chloride infusion 250 mL  250 mL IntraVENous PRN    acetaminophen (TYLENOL) tablet 650 mg  650 mg Oral Q6H PRN    diphenhydrAMINE (BENADRYL) capsule 25 mg  25 mg Oral Q6H PRN    enoxaparin (LOVENOX) injection 40 mg  40 mg SubCUTAneous Q24H    nystatin (MYCOSTATIN) 100,000 unit/gram cream   Topical BID    prochlorperazine (COMPAZINE) with saline injection 5 mg  5 mg IntraVENous Q6H PRN    naloxone (NARCAN) injection 0.4 mg  0.4 mg IntraVENous EVERY 2 MINUTES AS NEEDED    glucose chewable tablet 16 g  4 Tab Oral PRN    dextrose (D50W) injection syrg 12.5-25 g  12.5-25 g IntraVENous PRN    glucagon (GLUCAGEN) injection 1 mg  1 mg IntraMUSCular PRN    scopolamine (TRANSDERM-SCOP) 1 mg over 3 days 1 Patch  1 Patch TransDERmal Q72H    sodium chloride (NS) flush 10 mL  10 mL InterCATHeter Q24H    sodium chloride (NS) flush 10 mL  10 mL InterCATHeter Q8H    alteplase (CATHFLO) 1 mg in sterile water (preservative free) 1 mL injection  1 mg InterCATHeter PRN    bacitracin 500 unit/gram packet 1 Packet  1 Packet Topical PRN    sodium chloride (NS) flush 20 mL  20 mL InterCATHeter PRN    sodium chloride (NS) flush 10 mL  10 mL InterCATHeter Q24H    sodium chloride (NS) flush 10 mL  10 mL InterCATHeter PRN    sodium chloride (NS) flush 10 mL  10 mL InterCATHeter Q8H    dronabinol (MARINOL) capsule 2.5 mg  2.5 mg Oral BID    phenol throat spray (CHLORASEPTIC) 1 Spray  1 Spray Oral PRN    ondansetron (ZOFRAN) injection 4 mg  4 mg IntraVENous Q4H PRN    promethazine (PHENERGAN) 12.5 mg in 0.9% sodium chloride 50 mL IVPB  12.5 mg IntraVENous Q6H PRN    LORazepam (ATIVAN) injection 1 mg  1 mg IntraVENous Q6H PRN    albuterol (PROVENTIL VENTOLIN) nebulizer solution 2.5 mg  2.5 mg Nebulization Q4H PRN       Graham Auguste PA-C  5/15/2018      Pt. Seen and examined  Less coming out from the fistula even with her eating a regular diet. Has been ambulating but has not tried stairs yet. Gen- Alert in NAD  Abd- S/nt/nd  Fistula  Plan-  Taper pain medications if possible-D/w palliative  I do not think that it will be practical for her to place a red rubber catheter into the fistula multiple time a day in order to get tube feedings. She is likely going to need to go home on TPN. Have asked PT to start to try stairs.

## 2018-05-15 NOTE — PROGRESS NOTES
Problem: Mobility Impaired (Adult and Pediatric)  Goal: *Acute Goals and Plan of Care (Insert Text)  Physical Therapy Goals  Goals reassessed 5/11/2018 and upgraded to below:  1. Patient will move from supine to sit and sit to supine  in bed with minimal assistance/contact guard assist NOT using chair position for bed within 7 day(s). 2.  Patient will transfer from bed to chair and chair to bed with supervision/set-up using the least restrictive device within 7 day(s). 3.  Patient will perform sit to stand with distant supervision/set-up within 7 day(s). 4.  Patient will ambulate with supervision/set-up for 300 feet with the least restrictive device within 7 day(s). Goals reassessed 5/4/18, updated and remain appropriate over next 7 days  Continue progress towards current goals 4/26/18  Update 4/18/2018  1. Patient will move from supine to sit and sit to supine  in bed with minimal assistance  within 7 day(s). MET on 5/4/18-Updated goal:  Patient will move from supine to sit and sit to supine  in bed with supervision within 7 day(s). 2.  Patient will transfer from bed to chair and chair to bed with minimal assistance  using the least restrictive device within 7 day(s). MET on 5/4/18-Updated goal:  Patient will transfer from bed to chair and chair to bed with supervision using the least restrictive device within 7 day(s). 3.  Patient will perform sit to stand with SBA within 7 days. MET  4. Patient will ambulate 150' with SBA x 1 using LRAD within 7 days. MET    Update 4/6/2018  1. Patient will move from supine to sit and sit to supine  in bed with moderate assistance  within 7 day(s). 2.  Patient will transfer from bed to chair and chair to bed with moderate assistance  using the least restrictive device within 7 day(s). 3.  Patient will perform sit to stand with CGA within 7 days  4. Patient will ambulate 22' with moderate assist x 1 using LRAD within 7 days. Revised 3/27/2018  1. Patient will move from supine to sit and sit to supine  in bed with moderate assistance  within 7 day(s). 2.  Patient will transfer from bed to chair and chair to bed with moderate assistance  using the least restrictive device within 7 day(s). 3.  Patient will perform sit to stand with moderate assistance  within 7 day(s). 4.  Patient will ambulate 100' with moderate assist x1 using the least restrictive device within 7 day(s). Revisited 3/19/2018, goals remain appropriate, carry over    Physical Therapy Goals  Initiated 3/8/2018  1. Patient will move from supine to sit and sit to supine  in bed with moderate assistance  within 7 day(s). 2.  Patient will transfer from bed to chair and chair to bed with moderate assistance  using the least restrictive device within 7 day(s). 3.  Patient will perform sit to stand with moderate assistance  within 7 day(s). 4.  PT will assess gait with the least restrictive device within 7 day(s). physical Therapy TREATMENT  Patient: Lidya Beckett (38 y.o. female)  Date: 5/15/2018  Diagnosis: FISTULA  Small bowel fistula  INTEROCUTANOUS FISTULA <principal problem not specified>  Procedure(s) (LRB):  OPEN JEJUNOSTOMY TUBE PLACEMENT UNDER FLURO/ WOUND VAC CHANGE. (N/A) 33 Days Post-Op  Precautions: Fall, Skin, Contact  Chart, physical therapy assessment, plan of care and goals were reviewed. ASSESSMENT:  Pt was in good spirits ready to ambulate. Pt was able to increase gait without A.D. Pt has a cautious with UE positioned in high guard. Pt expressed tolerable back pain. Discussed with pt on stair training and how to initiate training. Pt does have increase fear with steps due to multiple falls. Will progress steps as able.    Progression toward goals:  [x]    Improving appropriately and progressing toward goals  []    Improving slowly and progressing toward goals  []    Not making progress toward goals and plan of care will be adjusted PLAN:  Patient continues to benefit from skilled intervention to address the above impairments. Continue treatment per established plan of care. Discharge Recommendations:  Rehab  Further Equipment Recommendations for Discharge:  TBD     SUBJECTIVE:   Patient stated I can try.     OBJECTIVE DATA SUMMARY:   Critical Behavior:  Neurologic State: Alert  Orientation Level: Oriented X4  Cognition: Appropriate decision making, Appropriate for age attention/concentration, Appropriate safety awareness, Follows commands  Safety/Judgement: Awareness of environment, Insight into deficits, Good awareness of safety precautions  Functional Mobility Training:  Bed Mobility:                    Transfers:  Sit to Stand: Stand-by assistance  Stand to Sit: Stand-by assistance                             Balance:  Sitting: Intact  Standing: Impaired; Without support  Standing - Static: Fair  Standing - Dynamic : Fair  Ambulation/Gait Training:  Distance (ft): 50 Feet (ft)  Assistive Device:  (HHA, furnature walking)  Ambulation - Level of Assistance: Minimal assistance        Gait Abnormalities: Decreased step clearance (high guard)        Base of Support: Widened     Speed/Liz: Slow  Step Length: Left shortened;Right shortened                   Stairs:              Neuro Re-Education:    Therapeutic Exercises:     Pain:  Pain Scale 1: Numeric (0 - 10)  Pain Intensity 1: 6  Pain Location 1: Abdomen  Pain Orientation 1: Mid  Pain Description 1: Intermittent; Sharp  Pain Intervention(s) 1: Medication (see MAR)  Activity Tolerance:   Limited   Please refer to the flowsheet for vital signs taken during this treatment.   After treatment:   [x]    Patient left in no apparent distress sitting up in chair  []    Patient left in no apparent distress in bed  [x]    Call bell left within reach  [x]    Nursing notified  []    Caregiver present  []    Bed alarm activated    COMMUNICATION/COLLABORATION:   The patients plan of care was discussed with: Registered Nurse    Lee Alatorre PTA   Time Calculation: 25 mins

## 2018-05-15 NOTE — PROGRESS NOTES
Bedside shift change report given to shaji Loyd (oncoming nurse) by Terra Grant (offgoing nurse). Report included the following information SBAR, Kardex, OR Summary, Procedure Summary, Intake/Output, MAR and Recent Results.

## 2018-05-15 NOTE — PROGRESS NOTES
NUTRITION       Recommendations:  1. Continue TPN at current cyclic goal  -- Continue with BG checks 2 hours into infusion and 1 hour after infusion is completed for accuracy (ie; 2100 and 1000 daily)  -- Order correction scale unless BG checks are discontinued as there is nothing ordered to correct if pt is hyperglycemic    2. Continue strict documentation of all po intake (liquid, meal and supplement percentages) and drain output    3. Consider checking Vit D (25OH)    Brief note. Chart reviewed. Pt remains on TPN as primary source of nutrition. Drain output remains too high for adequate absorption (see below). She was emptying her appliance during visit (700 mL). Pt admits she has tried the [facility] and would like more. RD to supply additional samples tomorrow. Noted per surgery plan was to possibly reattempt to feed enterally via fistula. If this is pursued, would continue TPN as suspect level of absorption will likely still be limited. Pt with no correction scale ordered. Current weight is up 25# x 5 days-- still no standing weight to assess since 4/27. Drain Output:  5/15: 1100 mL so far today  5/14: 3275 mL  5/13: 6300 mL  5/12: 6450 mL  9/16: 4875 mL    Cyclic TPN:   6%AA U36 @ 90 mL/hr x 1 hour   @ 155 mL/hr x 12 hours   @ 90 mL/hr x last hour  + 20% lipids, 250 mL 3x/week  -- Check BG 2 hours into infusion and one hour after infusion is completed    BG have improved over the past 2 days; however, checks have not been consistently at 2 hours in and 1 hour after infusion (they should be checked at 2100 and 1000). TPN is providing a daily average of 2091 kcal, 122 g protein in 2040 mL (2290 mL on lipid days). Estimated Nutrition Needs:   Kcals/day: 2041 Kcals/day (9432-6400 kcal/day (MSJ x 1-1.1))  Protein: 111 g (111-152 g/day (0.8-1.1 kg))  Fluid: 2250 ml (or 1 mL/kcal or per output)     Based On:  Kristen Ozuna  Weight Used: Actual wt (138.6 kg (standing on 4/27))    Margaret QUINTANILLA Margurite Soulier, [address]

## 2018-05-15 NOTE — PROGRESS NOTES
[facility] is unable to accept this patient into care due to her current functional status, explaining that she is now \"too high level\" for their program.      This  would recommend home TPN and home health PT at this point if the MD is agreeable. Care Management will continue to follow her disposition.    ROBERT Lama

## 2018-05-16 LAB
GLUCOSE BLD STRIP.AUTO-MCNC: 152 MG/DL (ref 65–100)
GLUCOSE BLD STRIP.AUTO-MCNC: 178 MG/DL (ref 65–100)
GLUCOSE BLD STRIP.AUTO-MCNC: 247 MG/DL (ref 65–100)
SERVICE CMNT-IMP: ABNORMAL

## 2018-05-16 PROCEDURE — 97116 GAIT TRAINING THERAPY: CPT

## 2018-05-16 PROCEDURE — 74011000250 HC RX REV CODE- 250: Performed by: SURGERY

## 2018-05-16 PROCEDURE — 74011000258 HC RX REV CODE- 258: Performed by: SURGERY

## 2018-05-16 PROCEDURE — 97530 THERAPEUTIC ACTIVITIES: CPT

## 2018-05-16 PROCEDURE — 77030010520

## 2018-05-16 PROCEDURE — 74011250637 HC RX REV CODE- 250/637: Performed by: NURSE PRACTITIONER

## 2018-05-16 PROCEDURE — 74011250636 HC RX REV CODE- 250/636: Performed by: NURSE PRACTITIONER

## 2018-05-16 PROCEDURE — 74011250637 HC RX REV CODE- 250/637: Performed by: PHYSICIAN ASSISTANT

## 2018-05-16 PROCEDURE — 65270000029 HC RM PRIVATE

## 2018-05-16 PROCEDURE — 74011250636 HC RX REV CODE- 250/636: Performed by: SURGERY

## 2018-05-16 PROCEDURE — 97535 SELF CARE MNGMENT TRAINING: CPT

## 2018-05-16 PROCEDURE — 82962 GLUCOSE BLOOD TEST: CPT

## 2018-05-16 PROCEDURE — 74011636637 HC RX REV CODE- 636/637: Performed by: SURGERY

## 2018-05-16 PROCEDURE — 77030011641 HC PASTE OST ADH BMS -A

## 2018-05-16 PROCEDURE — 74011250637 HC RX REV CODE- 250/637: Performed by: PHYSICAL MEDICINE & REHABILITATION

## 2018-05-16 RX ADMIN — I.V. FAT EMULSION 250 ML: 20 EMULSION INTRAVENOUS at 19:40

## 2018-05-16 RX ADMIN — DRONABINOL 2.5 MG: 2.5 CAPSULE ORAL at 17:28

## 2018-05-16 RX ADMIN — MORPHINE SULFATE 30 MG: 15 TABLET ORAL at 01:45

## 2018-05-16 RX ADMIN — Medication 10 ML: at 09:53

## 2018-05-16 RX ADMIN — MORPHINE SULFATE 30 MG: 15 TABLET ORAL at 17:28

## 2018-05-16 RX ADMIN — MORPHINE SULFATE 30 MG: 15 TABLET ORAL at 07:25

## 2018-05-16 RX ADMIN — MORPHINE SULFATE 100 MG: 100 TABLET, FILM COATED, EXTENDED RELEASE ORAL at 13:26

## 2018-05-16 RX ADMIN — ONDANSETRON HYDROCHLORIDE 4 MG: 2 INJECTION INTRAMUSCULAR; INTRAVENOUS at 13:22

## 2018-05-16 RX ADMIN — Medication 10 ML: at 13:38

## 2018-05-16 RX ADMIN — DRONABINOL 2.5 MG: 2.5 CAPSULE ORAL at 09:32

## 2018-05-16 RX ADMIN — ASPIRIN 325 MG: 325 TABLET ORAL at 09:32

## 2018-05-16 RX ADMIN — MORPHINE SULFATE 30 MG: 15 TABLET ORAL at 09:31

## 2018-05-16 RX ADMIN — MORPHINE SULFATE 30 MG: 15 TABLET ORAL at 04:43

## 2018-05-16 RX ADMIN — FAMOTIDINE: 10 INJECTION, SOLUTION INTRAVENOUS at 07:53

## 2018-05-16 RX ADMIN — ENOXAPARIN SODIUM 40 MG: 100 INJECTION SUBCUTANEOUS at 09:33

## 2018-05-16 RX ADMIN — PROCHLORPERAZINE EDISYLATE 5 MG: 5 INJECTION INTRAMUSCULAR; INTRAVENOUS at 09:52

## 2018-05-16 RX ADMIN — MORPHINE SULFATE 30 MG: 15 TABLET ORAL at 19:39

## 2018-05-16 RX ADMIN — FAMOTIDINE: 10 INJECTION, SOLUTION INTRAVENOUS at 20:47

## 2018-05-16 RX ADMIN — MORPHINE SULFATE 100 MG: 100 TABLET, FILM COATED, EXTENDED RELEASE ORAL at 06:35

## 2018-05-16 RX ADMIN — MORPHINE SULFATE 30 MG: 15 TABLET ORAL at 22:28

## 2018-05-16 RX ADMIN — Medication 10 ML: at 13:39

## 2018-05-16 RX ADMIN — FAMOTIDINE: 10 INJECTION, SOLUTION INTRAVENOUS at 19:42

## 2018-05-16 RX ADMIN — Medication 10 ML: at 06:35

## 2018-05-16 RX ADMIN — MORPHINE SULFATE 100 MG: 100 TABLET, FILM COATED, EXTENDED RELEASE ORAL at 22:24

## 2018-05-16 RX ADMIN — MORPHINE SULFATE 30 MG: 15 TABLET ORAL at 13:26

## 2018-05-16 NOTE — PROGRESS NOTES
Problem: Mobility Impaired (Adult and Pediatric)  Goal: *Acute Goals and Plan of Care (Insert Text)  Physical Therapy Goals  Goals reassessed 5/11/2018 and upgraded to below:  1. Patient will move from supine to sit and sit to supine  in bed with minimal assistance/contact guard assist NOT using chair position for bed within 7 day(s). 2.  Patient will transfer from bed to chair and chair to bed with supervision/set-up using the least restrictive device within 7 day(s). 3.  Patient will perform sit to stand with distant supervision/set-up within 7 day(s). 4.  Patient will ambulate with supervision/set-up for 300 feet with the least restrictive device within 7 day(s). Goals reassessed 5/4/18, updated and remain appropriate over next 7 days  Continue progress towards current goals 4/26/18  Update 4/18/2018  1. Patient will move from supine to sit and sit to supine  in bed with minimal assistance  within 7 day(s). MET on 5/4/18-Updated goal:  Patient will move from supine to sit and sit to supine  in bed with supervision within 7 day(s). 2.  Patient will transfer from bed to chair and chair to bed with minimal assistance  using the least restrictive device within 7 day(s). MET on 5/4/18-Updated goal:  Patient will transfer from bed to chair and chair to bed with supervision using the least restrictive device within 7 day(s). 3.  Patient will perform sit to stand with SBA within 7 days. MET  4. Patient will ambulate 150' with SBA x 1 using LRAD within 7 days. MET    Update 4/6/2018  1. Patient will move from supine to sit and sit to supine  in bed with moderate assistance  within 7 day(s). 2.  Patient will transfer from bed to chair and chair to bed with moderate assistance  using the least restrictive device within 7 day(s). 3.  Patient will perform sit to stand with CGA within 7 days  4. Patient will ambulate 22' with moderate assist x 1 using LRAD within 7 days. Revised 3/27/2018  1. Patient will move from supine to sit and sit to supine  in bed with moderate assistance  within 7 day(s). 2.  Patient will transfer from bed to chair and chair to bed with moderate assistance  using the least restrictive device within 7 day(s). 3.  Patient will perform sit to stand with moderate assistance  within 7 day(s). 4.  Patient will ambulate 100' with moderate assist x1 using the least restrictive device within 7 day(s). Revisited 3/19/2018, goals remain appropriate, carry over    Physical Therapy Goals  Initiated 3/8/2018  1. Patient will move from supine to sit and sit to supine  in bed with moderate assistance  within 7 day(s). 2.  Patient will transfer from bed to chair and chair to bed with moderate assistance  using the least restrictive device within 7 day(s). 3.  Patient will perform sit to stand with moderate assistance  within 7 day(s). 4.  PT will assess gait with the least restrictive device within 7 day(s). physical Therapy TREATMENT  Patient: Genna Salas (38 y.o. female)  Date: 5/16/2018  Diagnosis: FISTULA  Small bowel fistula  INTEROCUTANOUS FISTULA <principal problem not specified>  Procedure(s) (LRB):  OPEN JEJUNOSTOMY TUBE PLACEMENT UNDER FLURO/ WOUND VAC CHANGE. (N/A) 34 Days Post-Op  Precautions: Fall, Skin, Contact  Chart, physical therapy assessment, plan of care and goals were reviewed. ASSESSMENT:  Patient received supine in bed and agreeable to therapy. Patient reported nausea has subsided and eager to mobilize. Patient reported she was denied rehab and has plans to discharge home with HHPT. Pt reported her mother has built a ramp to enter home. Patient tolerated session well and making progress towards goals. Patient with reports of bilateral ankle pain and requested to use RW for ambulating in the hallway with CGA. Patient did ambulate within the room without AD and required min assist and noted pt reaching out for external support for balance. Patient returned to supine position in bed. Patient will continue to benefit from PT to progress mobility as tolerated and reach highest level of independence. Pt will benefit from HHPT upon discharge  Progression toward goals:  [x]    Improving appropriately and progressing toward goals  []    Improving slowly and progressing toward goals  []    Not making progress toward goals and plan of care will be adjusted     PLAN:  Patient continues to benefit from skilled intervention to address the above impairments. Continue treatment per established plan of care. Discharge Recommendations:  Home Health  Further Equipment Recommendations for Discharge:  none     SUBJECTIVE:   Patient stated I am now going home instead of rehab.     OBJECTIVE DATA SUMMARY:   Critical Behavior:  Neurologic State: Alert  Orientation Level: Oriented X4  Cognition: Appropriate decision making, Appropriate for age attention/concentration, Appropriate safety awareness, Follows commands  Safety/Judgement: Awareness of environment, Insight into deficits, Fall prevention  Functional Mobility Training:  Bed Mobility:     Supine to Sit:  (Utilized bed/chair position d/t lines and ostomies)              Transfers:  Sit to Stand: Stand-by assistance  Stand to Sit: Stand-by assistance                             Balance:  Sitting: Intact  Sitting - Static: Good (unsupported)  Sitting - Dynamic: Good (unsupported)  Standing: Impaired; With support  Standing - Static: Fair  Standing - Dynamic : Fair  Ambulation/Gait Training:  Distance (ft): 75 Feet (ft)  Assistive Device: Gait belt;Walker, rolling  Ambulation - Level of Assistance: Contact guard assistance;Minimal assistance (min A w/out AD, CGA with RW)        Gait Abnormalities: Decreased step clearance;Trunk sway increased        Base of Support: Widened     Speed/Liz: Pace decreased (<100 feet/min); Slow  Step Length: Right shortened;Left shortened       Pain:  Pain Scale 1: Numeric (0 - 10)  Pain Intensity 1: 7  Pain Location 1: Abdomen  Pain Orientation 1: Left; Lower  Pain Description 1: Stabbing; Intermittent  Pain Intervention(s) 1: Medication (see MAR)  Activity Tolerance:   Fair. Noted increased SOB  Please refer to the flowsheet for vital signs taken during this treatment.   After treatment:   []    Patient left in no apparent distress sitting up in chair  [x]    Patient left in no apparent distress in bed  [x]    Call bell left within reach  [x]    Nursing notified  []    Caregiver present  []    Bed alarm activated    COMMUNICATION/COLLABORATION:   The patients plan of care was discussed with: Occupational Therapist and Registered Nurse    Lorene Alexis, PT, DPT   Time Calculation: 28 mins

## 2018-05-16 NOTE — PROGRESS NOTES
NUTRITION COMPLETE ASSESSMENT    RECOMMENDATIONS:   1. Continue TPN at goal  -- Check BG at 2100 and 1000 daily for accuracy (this will allow for BG checks 2 hours into infusion and 1 hour after infusion is completed)    2. Continue strict documentation of all po intake and drain output daily    3. Add Zinc (10 mg/day x 10 days) and Ascorbic acid (500 mg/day) to TPN secondary to GI losses    4. With high drain output, suspect all electrolytes will require ongoing repletion; however, if retention is a concern, could consider trial of decreasing sodium chloride in TPN to see if pitting edema in lower extremities improves. Interventions/Plan:   Food/Nutrient Delivery: TPN as primary source of nutrition    Assessment:   Reason for Assessment:   [x]Reassessment     Diet:  GI Lite  Nutritionally Significant Medications: [x] Reviewed & Includes: zofran q 4 hours prn; compazine q6 hours prn; marinol twice/day; scopoplamine patch q 72 hours    TPN: 6%AA D20 @ 90 mL/hr x 1 hour    @ 155 mL/hr x 12 hours    @ 90 mL/hr x last hour   + MVI, 12 units insulin/L, famotidine 40 mg, thiamine (100 mg)   + 20% lipids, 250 mL 3x/week  Meal Intake: Patient Vitals for the past 100 hrs:   % Diet Eaten   05/15/18 1430 75 %   05/15/18 0910 50 %   05/14/18 1836 100 %   05/14/18 1421 90 %   05/14/18 1001 100 %   05/14/18 0951 100 %   05/13/18 2336 100 %   05/12/18 2348 75 %       Subjective:  Pt in really great spirits today. OT in the room and stood by while the pt was able to stand on the standing scale (something she has a fear of). She was fearful, but not tearful and was proud that she overcame that naomi. Pt asking about diet on discharge should she go home with TPN. If pt remains on TPN, this will be her primary source of nutrition. She does not have to consume large volumes of food and water as she has been doing; however, she admits that she prefers to do so (\"I'm hungry and I really enjoy it\").   She may continue as is, but knowing that bag changes and leakage may result and increase caregiver burden for herself and her mother. Objective:  Chart reviewed, discussed with RN. Pt remains on TPN as primary source of nutrition. She continues to have large volumes of output via her abdominal drain and wound (4050 mL yesterday). She was emptying her appliance during visit. She is aware that she could require long term TPN and may even have to discharge home with this. She is reluctant, but understands this may be her primary source of nutrition with questionable absorption. She is worried about edema with TPN. Explained that prior to initiation of TPN this last time, she was likely severely dehydrated. Pt now with adequate urine output and creatinine and renal function have improved since nutrition support resumed. She is taking the Banatrol samples, so provided 4 more (Lot [id number], expiration July 2018) to the bedside. No po intake documented 5/15 or so far today. Will need to continue ongoing documentation. TPN is providing a daily average of 2091 kcal, 122 g protein and 2040 mL (2290 mL on lipid days). Sodium chloride in TPN is providing 142.8 mEq/day (3284 mg/day). While high ostomy output likely warrants repletion of all electrolytes, may consider decreasing sodium chloride to see if there is any improvement in pitting edema? ? Estimated Nutrition Needs:   Kcals/day: 2041 Kcals/day (3248-2467 kcal/day (MSJ x 1-1.1))  Protein: 111 g (111-152 g/day (0.8-1.1 kg))  Fluid: 2250 ml (or 1 mL/kcal or per output)     Based On: Mercedes  Kiersten  Weight Used: Actual wt (138.6 kg (standing on 4/27))    Pt expected to meet estimated nutrient needs:  [x]   Yes (via TPN)    Nutrition Diagnosis:   1. 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  Less than optimal enteral nutrition related to slow tube feeding progression as evidenced by resolved (no longer on tube feedings)    Goals:     Pt to meet at least 90% of estimated needs via TPN over the next 5-7 days     Monitoring & Evaluation:    - Liquid meal replacement, Total energy intake   - Weight/weight change      Previous Nutrition Goals Met:  Yes  Previous Recommendations:      Yes    Education & Discharge Needs:   [] None Identified   [x] Identified and addressed    [x] Participated in care plan, discharge planning, and/or interdisciplinary rounds        Cultural, Worship and ethnic food preferences identified:  NONE      Skin Integrity: []Intact  [x]Other: see wound documentation  Edema: []None [x]Other: 1+ pitting (LLE, RLE)  Last BM: 5/14/18  Food Allergies: [x]None []Other    Anthropometrics:    Weight Loss Metrics 5/16/2018 3/7/2018 3/5/2018 2/28/2018 2/27/2018 2/27/2018 2/26/2018   Today's Wt 307 lb 8 oz - 304 lb 3.2 oz 306 lb 306 lb 8 oz - 307 lb   BMI - 52.78 kg/m2 52.22 kg/m2 52.52 kg/m2 52.61 kg/m2 - 52.7 kg/m2      Last 3 Recorded Weights in this Encounter    05/15/18 0718 05/16/18 0638 05/16/18 1151   Weight: 139.3 kg (307 lb) 137.2 kg (302 lb 6.4 oz) 139.5 kg (307 lb 8 oz)      Weight Source: Standing scale (comment)  Height: 5' 4\" (162.6 cm),    Body mass index is 52.78 kg/(m^2).   IBW : 54.4 kg (120 lb),    Usual Body Weight: 135.2 kg (298 lb) (1/2018),      Labs:  Lab Results   Component Value Date/Time    Sodium 134 (L) 05/15/2018 02:03 AM    Potassium 3.9 05/15/2018 02:03 AM    Chloride 104 05/15/2018 02:03 AM    CO2 20 (L) 05/15/2018 02:03 AM    Glucose 199 (H) 05/15/2018 02:03 AM    BUN 31 (H) 05/15/2018 02:03 AM    Creatinine 0.96 05/15/2018 02:03 AM    Calcium 8.7 05/15/2018 02:03 AM    Magnesium 1.7 05/15/2018 02:03 AM    Phosphorus 3.9 05/15/2018 02:03 AM    Albumin 2.0 (L) 05/14/2018 05:34 AM     No results found for: HBA1C, HGBE8, DTD1CNXS, IKG0PIWF  Lab Results   Component Value Date/Time    Glucose 199 (H) 05/15/2018 02:03 AM    Glucose (POC) 152 (H) 05/16/2018 10:18 AM      Lab Results   Component Value Date/Time    ALT (SGPT) 34 05/14/2018 05:34 AM    AST (SGOT) 38 (H) 05/14/2018 05:34 AM    Alk.  phosphatase 146 (H) 05/14/2018 05:34 AM    Bilirubin, direct 0.1 07/07/2009 06:38 PM    Bilirubin, total 0.3 05/14/2018 05:34 AM      [address]

## 2018-05-16 NOTE — PROGRESS NOTES
Palliative Medicine Consult  [place]ath: [phone] ([phone])    Patient Name: Lorna Hernandez  YOB: 1975    Date of Initial Consult: 3/12/18  Reason for Consult: Pain management, chronic and post op   Requesting Provider: Rajni   Primary Care Physician: Rama Vang MD     SUMMARY:   Lorna Hernandez is a 38 y.o. with a past history of Crohn's disease (followed by Dr Martin Urias), mult surgeries s/p total colectomy w/ end ileostomy, 6/2017 ex lap with small bowel perforation, 6/28/17 ex lap, LPA, repair or enterotomy, SMA stent on L, 7/16/2917 ex lap, KATHRINE, fistula exclusion w/ feeding tube placement and prolonged TPN who was admitted on 3/7/2018 from home for planned surgery, s/p ex lap w/ extensive KATHRINE and small bowel fistula take down, wound vac placement. 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 Had red rubber tube placed into fistula on 4/4 , then open j tube placement 4/17, tolerating slow TFs initially until it fell out and has been back on TPN. Supposed to go to [address] [place] [address] but can no longer go while on TPN. Known to our team during past hospitalizations. Has had lengthy hospital stays w/ mult complications. Has required [facility] stays in past. Has chronic pain from Crohn's disease (and has not been able to tolerate home Crohn's meds recently due to acute issues- had been on steroids, stopped prior to surgery). Discussion of Remicaide in future. Current medical issues leading to Palliative Medicine involvement include: pain management. Patient's mother, Naina Tanner is NOK.  reviewed, no abberrancies- one prescriber for opioids and one pharmacy. Most recently filled 3/9/18: MSContin 100mg tid and Morphine IR 30mg- 8 tabs a day. Has been tried on equivalent dose of Fentanyl patch w/out same benefit. Discussed Methadone. Have communicated w/ the provider (Dr Stephanie Dhaliwal) who follows her for pain issues.       PALLIATIVE DIAGNOSES: 1. Chronic abdominal pain from Cronh's disease and hx of surgeries  2. Lumbar back pain, chronic   3. Nausea  4. Anxiety   5. Edema ,hortencia when on TPN  6. Grief/depressive sx   7. Opioid tolerance      PLAN:     Abd pain from surgical incision and wound care as well as underlying chronic pain from Crohn's which is worse when off her steroids. Has high opioid tolerance, no aberrancies on . We have done opioid rotation in the hospital for her in past. Pt also w/ chronic lower back pain that she is feeling now that she is more active. Have discussed w/ provider who follows for her abdominal pain, Dr Halima Post this stay. Mom Earline Ocasio states that she has one month supply of medications at home. Pain :    1. Pt's pain improving as she is healing. Wishes she could go to [address] [place] [address], but okay w/ going to just home w/ PT while on TPN. 2. Tolerated tapering of pain meds yest. Today has some nausea, but the decr of meds ~ 45mg so don't think this is enough to cause withdrawal sx. Pt also w/ incr gas, so will monitor. 3. Recommend cont her home regimen (which we went back to on 5/15/18)- MSContin 100mg tid and Morphine IR 30mg every 3h scheduled. 4. I still think that as long as SBP >= 90 and pt easy to arouse, pt ok to receive any / all opioids including scheduled / PRN; of course, defer to primary service opinion on this. She is on significantly less opioids than after surgery. 5. For lower back pain, cont Lidoderm patches b/l. Bowel regimen per primary team.     6. Continuing to support. Available if needed again. Has pain medications at home- so should not need Rx when discharged. She knows to make a f/u appt w/ Dr Naida Chapman who helps manage her pain.      Communicated plan of care with: Palliative IDT     GOALS OF CARE / TREATMENT PREFERENCES:     GOALS OF CARE:  Patient/Health Care Proxy Stated Goals:  (get pain better, treatment of acute issues.)      TREATMENT PREFERENCES:   Code Status: Full Code    Advance Care Planning:  Advance Care Planning 3/15/2018   Patient's Healthcare Decision Maker is: Named in scanned ACP document   Primary Decision Maker Name Jessica Bennett   Primary Decision Maker Phone Number [phone]   Primary Decision Maker Relationship to Patient Parent   Secondary Decision Maker Name Telly Nath Phone Number [phone]   Secondary Decision Maker Relationship to Patient Unknown   Confirm Advance Directive Yes, on file   Patient Would Like to Complete Advance Directive -   Does the patient have other document types -       Medical Interventions: Full interventions           Other:    As far as possible, the palliative care team has discussed with patient / health care proxy about goals of care / treatment preferences for patient. HISTORY:     Reviewed vitals, I/O's, labs, imaging, MAR, notes. HPI/SUBJECTIVE:    The patient is:   [x] Verbal and participatory  [] Non-participatory due to:     Pt w/ pain stable. Is having some nausea, but still able to eat some (gingerale, sandwich) and feels that she might be having more gas which is contributing to the nausea.      Clinical Pain Assessment (nonverbal scale for severity on nonverbal patients):   Clinical Pain Assessment  Severity: 7  Location: Generalized abdomen/ axial lower back w/out radiation into legs  Character: Sharp/ aching   Duration: Pain improved since surgery/ chronic   Effect: Makes ambulation more difficult   Factors: Better w/ medication/ better w/ standing up straight  Frequency: Constant          Duration: for how long has pt been experiencing pain (e.g., 2 days, 1 month, years)  Frequency: how often pain is an issue (e.g., several times per day, once every few days, constant)     FUNCTIONAL ASSESSMENT:     Palliative Performance Scale (PPS):  PPS: 70       PSYCHOSOCIAL/SPIRITUAL SCREENING:     Palliative IDT has assessed this patient for cultural preferences / practices and a referral made as appropriate to needs (Cultural Services, Patient Advocacy, Ethics, etc.)    Advance Care Planning:  Advance Care Planning 3/15/2018   Patient's Healthcare Decision Maker is: Named in scanned ACP document   Primary Decision Maker Name Geremias Crocker   Primary Decision Maker Phone Number [phone]   Primary Decision Maker Relationship to Patient Parent   Secondary Decision Maker Name Telly Nath Phone Number [phone]   Secondary Decision Maker Relationship to Patient Unknown   Confirm Advance Directive Yes, on file   Patient Would Like to Complete Advance Directive -   Does the patient have other document types -       Any spiritual / Oriental orthodox concerns:  [] Yes /  [x] No    Caregiver Burnout:  [] Yes /  [x] No /  [] No Caregiver Present      Anticipatory grief assessment:   [x] Normal  / [] Maladaptive       ESAS Anxiety: Anxiety: 0    ESAS Depression: Depression: 0        REVIEW OF SYSTEMS:     Positive and pertinent negative findings in ROS are noted above in HPI. The following systems were [x] reviewed / [] unable to be reviewed as noted in HPI  Other findings are noted below. Systems: constitutional, ears/nose/mouth/throat, respiratory, gastrointestinal, genitourinary, musculoskeletal, integumentary, neurologic, psychiatric, endocrine. Positive findings noted below. Modified ESAS Completed by: provider   Fatigue: 2 Drowsiness: 0   Depression: 0 Pain: 7   Anxiety: 0 Nausea: 0   Anorexia: 0 Dyspnea: 0     Constipation: No              PHYSICAL EXAM:     From RN flowsheet:  Wt Readings from Last 3 Encounters:   05/16/18 307 lb 8 oz (139.5 kg)   03/05/18 304 lb 3.2 oz (138 kg)   02/28/18 306 lb (138.8 kg)     Blood pressure 104/63, pulse 100, temperature 98.2 °F (36.8 °C), resp. rate 16, height 5' 4\" (1.626 m), weight 307 lb 8 oz (139.5 kg), SpO2 99 %.     Pain Scale 1: Numeric (0 - 10)  Pain Intensity 1: 7  Pain Onset 1: chronic  Pain Location 1: Abdomen  Pain Orientation 1: Left, Lower  Pain Description 1: Stabbing, Intermittent  Pain Intervention(s) 1: Medication (see MAR)  Last bowel movement, if known: stool output in ostomy     Constitutional: awake, alert, oriented, no sedation or confusion   Eyes: pupils equal, anicteric  ENMT: moist mucous membranes, no nasal discharge  Respiratory: breathing not labored, symmetric  Cardiovascular: regular rhythm, no murmur  Gastrointestinal: soft, + bowel sounds, wound clean   Musculoskeletal: no deformity, can raise b/l LEs in bed  Skin: warm, dry, pale  Ext: B/l pitting LE edema   Neurologic: following commands, moving all extremities  Psychiatric: full affect, no hallucinations         HISTORY:     Active Problems:    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)      Past Medical History:   Diagnosis Date    Adverse effect of anesthesia     WOKE DURING SURGERY    Arthritis     Blood clot in vein 2017    STOMACH    Crohn's disease ([address] 8/15/2011    DVT (deep venous thrombosis) ([address] 8/15/2011    LEFT LEG    Edema     GENERALIZED R/T TPN; WAIST DOWN    Incarcerated ventral hernia 8/15/2011    Lupus     Lyme disease     Psychiatric disorder     ANXIETY    Right flank pain 8/22/2011    Seizures ([address] 1990    LAST AT AGE 17    Stroke [facility]) 1990    age 17;  A WEEK POST OP, HAD SEIZURES AND STROKE, WAS IN COMA FOR A MONTH    Thyroid disease     HYPO-NO MEDS      Past Surgical History:   Procedure Laterality Date    HX APPENDECTOMY      HX GYN  2015    HIDRADENTIS LABIA    HX OTHER SURGICAL      multiple procedures related to her Crohn's disease    HX OTHER SURGICAL      ABDOMINAL SURGERY REMOVING COLON, 2/3 STOMACH, 1/3 SMALL INTESTINE    [place] LAP, INCISIONAL HERNIA REPAIR,INCARCERATED  9-10-08    dr. Nupur Wetzel      Family History   Problem Relation Age of Onset    Hypertension Father     Stroke Father     Other Father      arthritis    Arthritis-osteo Father     Hypertension Mother     Arthritis-osteo Mother     Anesth Problems Neg Hx       History reviewed, no pertinent family history.   Social History   Substance Use Topics    Smoking status: Former Smoker     Packs/day: 1.00     Years: 16.00     Quit date: 2/27/2017    Smokeless tobacco: Former User      Comment: OFF AND ON    Alcohol use No     Allergies   Allergen Reactions    Sulfa (Sulfonamide Antibiotics) Anaphylaxis    Demerol [Meperidine] Rash    Soma [Carisoprodol] Rash and Nausea Only    Toradol [Ketorolac Tromethamine] Nausea and Vomiting      Current Facility-Administered Medications   Medication Dose Route Frequency    TPN ADULT - CENTRAL   IntraVENous TITRATE    morphine CR (MS CONTIN) tablet 100 mg  100 mg Oral Q8H    morphine IR (MS IR) tablet 30 mg  30 mg Oral Q3H    fat emulsion 20% (LIPOSYN, INTRAlipid) infusion 250 mL  250 mL IntraVENous Q MON, WED & FRI    guaiFENesin (ROBITUSSIN) 100 mg/5 mL oral liquid 100 mg  100 mg Oral Q4H PRN    morphine IR (MS IR) tablet 30 mg  30 mg Oral DAILY PRN    lidocaine (LIDODERM) 5 % patch 2 Patch  2 Patch TransDERmal Q24H    0.9% sodium chloride infusion 250 mL  250 mL IntraVENous PRN    aspirin (ASPIRIN) tablet 325 mg  325 mg Oral DAILY    0.9% sodium chloride infusion 250 mL  250 mL IntraVENous PRN    acetaminophen (TYLENOL) tablet 650 mg  650 mg Oral Q6H PRN    diphenhydrAMINE (BENADRYL) capsule 25 mg  25 mg Oral Q6H PRN    enoxaparin (LOVENOX) injection 40 mg  40 mg SubCUTAneous Q24H    nystatin (MYCOSTATIN) 100,000 unit/gram cream   Topical BID    prochlorperazine (COMPAZINE) with saline injection 5 mg  5 mg IntraVENous Q6H PRN    naloxone (NARCAN) injection 0.4 mg  0.4 mg IntraVENous EVERY 2 MINUTES AS NEEDED    glucose chewable tablet 16 g  4 Tab Oral PRN    dextrose (D50W) injection syrg 12.5-25 g  12.5-25 g IntraVENous PRN    glucagon (GLUCAGEN) injection 1 mg  1 mg IntraMUSCular PRN    scopolamine (TRANSDERM-SCOP) 1 mg over 3 days 1 Patch  1 Patch TransDERmal Q72H    sodium chloride (NS) flush 10 mL  10 mL InterCATHeter Q24H    sodium chloride (NS) flush 10 mL  10 mL InterCATHeter Q8H    alteplase (CATHFLO) 1 mg in sterile water (preservative free) 1 mL injection  1 mg InterCATHeter PRN    bacitracin 500 unit/gram packet 1 Packet  1 Packet Topical PRN    sodium chloride (NS) flush 20 mL  20 mL InterCATHeter PRN    sodium chloride (NS) flush 10 mL  10 mL InterCATHeter Q24H    sodium chloride (NS) flush 10 mL  10 mL InterCATHeter PRN    sodium chloride (NS) flush 10 mL  10 mL InterCATHeter Q8H    dronabinol (MARINOL) capsule 2.5 mg  2.5 mg Oral BID    phenol throat spray (CHLORASEPTIC) 1 Spray  1 Spray Oral PRN    ondansetron (ZOFRAN) injection 4 mg  4 mg IntraVENous Q4H PRN    promethazine (PHENERGAN) 12.5 mg in 0.9% sodium chloride 50 mL IVPB  12.5 mg IntraVENous Q6H PRN    LORazepam (ATIVAN) injection 1 mg  1 mg IntraVENous Q6H PRN    albuterol (PROVENTIL VENTOLIN) nebulizer solution 2.5 mg  2.5 mg Nebulization Q4H PRN          LAB AND IMAGING FINDINGS:     Lab Results   Component Value Date/Time    WBC 10.2 05/15/2018 11:00 AM    HGB 8.1 (L) 05/15/2018 11:00 AM    PLATELET 914 25/85/6130 11:00 AM     Lab Results   Component Value Date/Time    Sodium 134 (L) 05/15/2018 02:03 AM    Potassium 3.9 05/15/2018 02:03 AM    Chloride 104 05/15/2018 02:03 AM    CO2 20 (L) 05/15/2018 02:03 AM    BUN 31 (H) 05/15/2018 02:03 AM    Creatinine 0.96 05/15/2018 02:03 AM    Calcium 8.7 05/15/2018 02:03 AM    Magnesium 1.7 05/15/2018 02:03 AM    Phosphorus 3.9 05/15/2018 02:03 AM      Lab Results   Component Value Date/Time    AST (SGOT) 38 (H) 05/14/2018 05:34 AM    Alk.  phosphatase 146 (H) 05/14/2018 05:34 AM    Protein, total 6.4 05/14/2018 05:34 AM    Albumin 2.0 (L) 05/14/2018 05:34 AM    Globulin 4.4 (H) 05/14/2018 05:34 AM     Lab Results   Component Value Date/Time    INR 1.0 12/07/2017 08:37 AM    Prothrombin time 10.0 12/07/2017 08:37 AM      No results found for: IRON, FE, TIBC, IBCT, PSAT, FERR   No results found for: PH, PCO2, PO2  No components found for: GLPOC   No results found for: CPK, CKMB             Total time:   Counseling / coordination time, spent as noted above:   > 50% counseling / coordination?:     Prolonged service was provided for  []30 min   []75 min in face to face time in the presence of the patient, spent as noted above. Time Start:   Time End:   Note: this can only be billed with 32808 (initial) or 46331 (follow up). If multiple start / stop times, list each separately.

## 2018-05-16 NOTE — PROGRESS NOTES
Physical Therapy:    Attempted to see patient for PT session, however pt reported sudden onset of nausea. PCT in room and helping patient. RN has been notified. Will defer and follow up at a later time.      Jt Kelley, PT, DPT

## 2018-05-16 NOTE — PROGRESS NOTES
Bedside shift change report given to ST. ROLA MUNSON (oncoming nurse) by Marisol Mcintosh (offgoing nurse). Report included the following information SBAR, Kardex, Intake/Output and MAR.

## 2018-05-16 NOTE — WOUND CARE
SHANE Note:     Follow-up visit for fistula pouch change. Chart shows:  Admitted for fistula takdown 3/7/18 by Dr. Tiffanie Castañeda with [facility] placement in [address] [place] [address]; history of multiple abdominal surgeries and Crohns disease. Admitted from home. Mother able to provide assistance in pouching prior to admission and became very competent in doing so.       Assessment:   Patient is A&O x 4, continent and mobile - moves around room with assistance & has been working with PT. Douglas Breaks  Bed: total care bariatric  Uses female urinal.       1. Midline abdominal surgical wound = 12 x 11 x 1.5 cm  60% pink granular wound base and 40% stomatized mucus membrane centrally with  peristalsis and output @ 4 o'clock. Pouched using pouch she provided from home - XL Hawk's wound manager with 9 Hawk's rings and paste to achieve seal.      Change pouch as needed. Discussed above plan with patient & RN.     Transition of Care: Plan to follow as needed while admitted to hospital.      YOLANDA AguileraN, RN, [facility]  Certified Wound, Ostomy, Continence Nurse  office [phone]  pager [phone] or call  to page

## 2018-05-16 NOTE — PROGRESS NOTES
Problem: Self Care Deficits Care Plan (Adult)  Goal: *Acute Goals and Plan of Care (Insert Text)  Occupational Therapy Goals:  Weekly re-assessment- 5/11/18  1. Patient will complete bathing upper body to knees with setup within 7 days. CONT  2. Patient will complete bariatric BSC transfer with supervision bariatric RW within 7 days. CONT   3. Patient will complete lower body dressing activities with supervision within 7 days using AE. CONT  4. Patient will perform standing for clothing management tasks (toileting or dressing) supervision without AD support within 7 days. GOAL MET- upgraded  5. Patient will complete light resistance band UE exercise program in standing with supervision within 7 days. GOAL MET- upgraded    Goals reviewed and continued: 5/4/18  Revised 4/27/18  1. Patient will complete bathing upper body to knees with setup within 7 days upgraded 4/27  2. Patient will complete bariatric BSC transfer with supervision bariatric RW within 7 days. Upgraded 4/27  3. Patient will complete lower body dressing activities with supervision within 7 days using AE  4. Patient will perform standing for clothing management tasks (toileting or dressing) supervision bariatric RW within 7 days. Upgraded 4/27  5. Patient will complete light resistance band UE exercise program independently within 7 days. Upgraded 4/27/18    Reevaluated 4/20/2018  1. Patient will complete grooming activity sitting EOB without support for 10 minutes within 7 days  2. Patient will complete BSC transfer with min A x 1 persons within 7 days. 3. Patient will complete lower body dressing activities with supervision within 7 days using AE  4. Patient will perform standing for clothing management tasks (toileting or dressing) with min A x 1 persons within 7 days. 5. Patient will complete UE exercise program independently within 7 days. Reevaluated 4/10/2018  1.   Patient will complete grooming activity sitting EOB without support for 10 minutes within 7 days. 2.  Patient will complete BSC transfer with min A x 2 persons within 7 days. 3.  Patient will complete lower body dressing activities with min A within 7 days. 4.  Patient will perform standing for clothing management tasks (toileting or dressing) with min A x 2 persons within 7 days. 5.  Patient will complete UE exercise program independently within 7 days. Initiated 4/4/2018  1. Patient will perform rolling and pulling self up to head of bed with min assist to assist with ADL's at bed level within 7 day(s). 2.  Patient will perform static sitting edge of bed > or = 10 minutes with supervision/set-up within 7 day(s). 3.  Patient will perform static standing with bilateral UE support on rolling walker > or = 3 minutes with minimal assistance/contact guard assist x's 2 within 7 day(s). 4.  Patient will perform toilet transfers with moderate assistance x's 2 to bedside commode within 7 days. 5.  Patient will perform bilateral UE AROM and strengthening exercises throughout day and grade exercises prn to increase demand within 7 day(s). Occupational Therapy TREATMENT  Patient: Chaitanya Sahu (38 y.o. female)  Date: 5/16/2018  Diagnosis: FISTULA  Small bowel fistula  INTEROCUTANOUS FISTULA <principal problem not specified>  Procedure(s) (LRB):  OPEN JEJUNOSTOMY TUBE PLACEMENT UNDER FLURO/ WOUND VAC CHANGE. (N/A) 34 Days Post-Op  Precautions: Fall, Skin, Contact  Chart, occupational therapy assessment, plan of care, and goals were reviewed. ASSESSMENT:  Patient received supine in bed with pastoral care present, requiring additional time for emotional processing of situation. Dietary present as well during session. Encouraging patient to complete standing weight to work on standing tolerance, overcome anxieties (reports she has PTSD from using scale previously and having a significant GLF at home), and reposition OOB.  Patient requiring significantly increased time and encouragement of safety to complete transfer. Gait belt applied. Patient utilizing \"up with the good, down with the bad\" technique. Lunch then present. Patient remaining in full bed/chair position for lunch. Patient would benefit from inpatient rehab, however per CM has been denied, therefore recommend [address] with family support. Progression toward goals:  []       Improving appropriately and progressing toward goals  [x]       Improving slowly and progressing toward goals  []       Not making progress toward goals and plan of care will be adjusted     PLAN:  Patient continues to benefit from skilled intervention to address the above impairments. Continue treatment per established plan of care. Discharge Recommendations:  Home Health (denied rehab)  Further Equipment Recommendations for Discharge:  TBD     SUBJECTIVE:   Patient stated It's so stupid that I'm so anxious about this.     OBJECTIVE DATA SUMMARY:   Cognitive/Behavioral Status:  Neurologic State: Alert  Orientation Level: Oriented X4  Cognition: Appropriate decision making; Appropriate for age attention/concentration; Appropriate safety awareness; Follows commands  Perception: Appears intact  Perseveration: No perseveration noted  Safety/Judgement: Awareness of environment; Insight into deficits; Fall prevention    Functional Mobility and Transfers for ADLs:  Bed Mobility:  Supine to Sit:  (Utilized bed/chair position d/t lines and ostomies)    Transfers:  Sit to Stand: Stand-by assistance    Balance:  Sitting: Intact  Sitting - Static: Good (unsupported)  Sitting - Dynamic: Good (unsupported)  Standing: Impaired; With support  Standing - Static: Fair  Standing - Dynamic : Fair    ADL Intervention:  Cognitive Retraining  Safety/Judgement: Awareness of environment; Insight into deficits; Fall prevention    Therapeutic Exercises:   - Patient completed sit<>stand transfer from full bed/chair position and taking few steps on/off standing scale (307.5lb)  - Remained in full bed/chair position for lunch  - Reviewed upper body therapeutic exercises, indicated she attempts to complete as often as she can but always has visitors    Pain:  Pain Scale 1: Numeric (0 - 10)  Pain Intensity 1: 7  Pain Location 1: Abdomen  Pain Orientation 1: Left; Lower  Pain Description 1: Stabbing; Intermittent  Pain Intervention(s) 1: Medication (see MAR)  Activity Tolerance:   Good. Please refer to the flowsheet for vital signs taken during this treatment.   After treatment:   [x] Patient left in no apparent distress sitting up in bed/chair  [] Patient left in no apparent distress in bed  [x] Call bell left within reach  [x] Nursing notified  [] Caregiver present  [] Bed alarm activated    COMMUNICATION/COLLABORATION:   The patients plan of care was discussed with: Physical Therapist and Registered Nurse    Jake Leslie OT  Time Calculation: 43 mins

## 2018-05-16 NOTE — PROGRESS NOTES
Follow up visit with Jocelyn Meraz. Pt appeared in good spirits today. She shared of plans to go home next week and how she is feeling about that transition. She indicates that there are obstacles to work through, but expresses hope related to the future. She also reflected on some past life experiences and how they still affect her today. Offered listening presence and emotional support. No additional needs expressed at this time.     Bhumika Menchaca, Palliative

## 2018-05-16 NOTE — PROGRESS NOTES
Progress Note    Patient: Marsha Giraldo MRN: [id number]  SSN: [id number]    YOB: 1975  Age: 38 y.o. Sex: female      Admit Date: 3/7/2018    34 Days Post-Op    Procedure:  Procedure(s):  OPEN JEJUNOSTOMY TUBE PLACEMENT UNDER FLURO/ WOUND VAC CHANGE. Subjective:     Patient back to home doses of pain medication  Working with PT. Still on TPN    Objective:     Visit Vitals    /63 (BP 1 Location: Left leg, BP Patient Position: At rest)    Pulse 100    Temp 98.2 °F (36.8 °C)    Resp 16    Ht 5' 4\" (1.626 m)    Wt 302 lb 6.4 oz (137.2 kg)    SpO2 99%    BMI 51.91 kg/m2       Temp (24hrs), Av.8 °F (37.1 °C), Min:98.2 °F (36.8 °C), Max:99.8 °F (37.7 °C)      Physical Exam:    Gen- Alert in NAD  Abd- Soft nontender   Fistula unchanged - still putting out about 4500 a day     Data Review: images and reports reviewed    Lab Review: All lab results for the last 24 hours reviewed.   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

## 2018-05-17 LAB
ANION GAP SERPL CALC-SCNC: 9 MMOL/L (ref 5–15)
BUN SERPL-MCNC: 35 MG/DL (ref 6–20)
BUN/CREAT SERPL: 41 (ref 12–20)
CALCIUM SERPL-MCNC: 9.3 MG/DL (ref 8.5–10.1)
CHLORIDE SERPL-SCNC: 108 MMOL/L (ref 97–108)
CO2 SERPL-SCNC: 16 MMOL/L (ref 21–32)
CREAT SERPL-MCNC: 0.86 MG/DL (ref 0.55–1.02)
ERYTHROCYTE [DISTWIDTH] IN BLOOD BY AUTOMATED COUNT: 17 % (ref 11.5–14.5)
GLUCOSE BLD STRIP.AUTO-MCNC: 131 MG/DL (ref 65–100)
GLUCOSE BLD STRIP.AUTO-MCNC: 207 MG/DL (ref 65–100)
GLUCOSE SERPL-MCNC: 193 MG/DL (ref 65–100)
HCT VFR BLD AUTO: 26.4 % (ref 35–47)
HGB BLD-MCNC: 8 G/DL (ref 11.5–16)
MAGNESIUM SERPL-MCNC: 1.7 MG/DL (ref 1.6–2.4)
MCH RBC QN AUTO: 28.7 PG (ref 26–34)
MCHC RBC AUTO-ENTMCNC: 30.3 G/DL (ref 30–36.5)
MCV RBC AUTO: 94.6 FL (ref 80–99)
NRBC # BLD: 0 K/UL (ref 0–0.01)
NRBC BLD-RTO: 0 PER 100 WBC
PHOSPHATE SERPL-MCNC: 1.9 MG/DL (ref 2.6–4.7)
PLATELET # BLD AUTO: 281 K/UL (ref 150–400)
PMV BLD AUTO: 9.2 FL (ref 8.9–12.9)
POTASSIUM SERPL-SCNC: 4.7 MMOL/L (ref 3.5–5.1)
RBC # BLD AUTO: 2.79 M/UL (ref 3.8–5.2)
SERVICE CMNT-IMP: ABNORMAL
SERVICE CMNT-IMP: ABNORMAL
SODIUM SERPL-SCNC: 133 MMOL/L (ref 136–145)
WBC # BLD AUTO: 7.9 K/UL (ref 3.6–11)

## 2018-05-17 PROCEDURE — 82962 GLUCOSE BLOOD TEST: CPT

## 2018-05-17 PROCEDURE — 74011250637 HC RX REV CODE- 250/637: Performed by: NURSE PRACTITIONER

## 2018-05-17 PROCEDURE — 74011000250 HC RX REV CODE- 250: Performed by: SURGERY

## 2018-05-17 PROCEDURE — 97116 GAIT TRAINING THERAPY: CPT

## 2018-05-17 PROCEDURE — 85027 COMPLETE CBC AUTOMATED: CPT | Performed by: SURGERY

## 2018-05-17 PROCEDURE — 93970 EXTREMITY STUDY: CPT

## 2018-05-17 PROCEDURE — 97530 THERAPEUTIC ACTIVITIES: CPT

## 2018-05-17 PROCEDURE — 84100 ASSAY OF PHOSPHORUS: CPT | Performed by: SURGERY

## 2018-05-17 PROCEDURE — 97535 SELF CARE MNGMENT TRAINING: CPT

## 2018-05-17 PROCEDURE — 74011000258 HC RX REV CODE- 258: Performed by: SURGERY

## 2018-05-17 PROCEDURE — 82306 VITAMIN D 25 HYDROXY: CPT | Performed by: SURGERY

## 2018-05-17 PROCEDURE — 65270000029 HC RM PRIVATE

## 2018-05-17 PROCEDURE — 83735 ASSAY OF MAGNESIUM: CPT | Performed by: SURGERY

## 2018-05-17 PROCEDURE — 74011250636 HC RX REV CODE- 250/636: Performed by: SURGERY

## 2018-05-17 PROCEDURE — 74011250637 HC RX REV CODE- 250/637: Performed by: PHYSICIAN ASSISTANT

## 2018-05-17 PROCEDURE — 80048 BASIC METABOLIC PNL TOTAL CA: CPT | Performed by: SURGERY

## 2018-05-17 PROCEDURE — 74011250637 HC RX REV CODE- 250/637: Performed by: PHYSICAL MEDICINE & REHABILITATION

## 2018-05-17 PROCEDURE — 74011636637 HC RX REV CODE- 636/637: Performed by: SURGERY

## 2018-05-17 PROCEDURE — 74011250636 HC RX REV CODE- 250/636: Performed by: NURSE PRACTITIONER

## 2018-05-17 PROCEDURE — 36415 COLL VENOUS BLD VENIPUNCTURE: CPT | Performed by: SURGERY

## 2018-05-17 RX ORDER — FUROSEMIDE 10 MG/ML
20 INJECTION INTRAMUSCULAR; INTRAVENOUS DAILY
Status: COMPLETED | OUTPATIENT
Start: 2018-05-17 | End: 2018-05-19

## 2018-05-17 RX ADMIN — Medication 10 ML: at 09:58

## 2018-05-17 RX ADMIN — MORPHINE SULFATE 30 MG: 15 TABLET ORAL at 07:56

## 2018-05-17 RX ADMIN — FUROSEMIDE 20 MG: 10 INJECTION, SOLUTION INTRAMUSCULAR; INTRAVENOUS at 13:58

## 2018-05-17 RX ADMIN — Medication 10 ML: at 14:00

## 2018-05-17 RX ADMIN — MORPHINE SULFATE 100 MG: 100 TABLET, FILM COATED, EXTENDED RELEASE ORAL at 22:11

## 2018-05-17 RX ADMIN — MORPHINE SULFATE 100 MG: 100 TABLET, FILM COATED, EXTENDED RELEASE ORAL at 05:58

## 2018-05-17 RX ADMIN — MORPHINE SULFATE 30 MG: 15 TABLET ORAL at 13:44

## 2018-05-17 RX ADMIN — ASCORBIC ACID: 500 INJECTION, SOLUTION INTRAMUSCULAR; INTRAVENOUS; SUBCUTANEOUS at 18:21

## 2018-05-17 RX ADMIN — MORPHINE SULFATE 100 MG: 100 TABLET, FILM COATED, EXTENDED RELEASE ORAL at 14:00

## 2018-05-17 RX ADMIN — Medication 10 ML: at 22:12

## 2018-05-17 RX ADMIN — PROCHLORPERAZINE EDISYLATE 5 MG: 5 INJECTION INTRAMUSCULAR; INTRAVENOUS at 15:34

## 2018-05-17 RX ADMIN — DRONABINOL 2.5 MG: 2.5 CAPSULE ORAL at 09:55

## 2018-05-17 RX ADMIN — ENOXAPARIN SODIUM 40 MG: 100 INJECTION SUBCUTANEOUS at 09:58

## 2018-05-17 RX ADMIN — ASCORBIC ACID: 500 INJECTION, SOLUTION INTRAMUSCULAR; INTRAVENOUS; SUBCUTANEOUS at 19:29

## 2018-05-17 RX ADMIN — MORPHINE SULFATE 30 MG: 15 TABLET ORAL at 22:11

## 2018-05-17 RX ADMIN — MORPHINE SULFATE 30 MG: 15 TABLET ORAL at 19:28

## 2018-05-17 RX ADMIN — Medication 10 ML: at 06:08

## 2018-05-17 RX ADMIN — MORPHINE SULFATE 30 MG: 15 TABLET ORAL at 01:50

## 2018-05-17 RX ADMIN — ASPIRIN 325 MG: 325 TABLET ORAL at 09:55

## 2018-05-17 RX ADMIN — DRONABINOL 2.5 MG: 2.5 CAPSULE ORAL at 18:20

## 2018-05-17 RX ADMIN — MORPHINE SULFATE 30 MG: 15 TABLET ORAL at 09:55

## 2018-05-17 RX ADMIN — FAMOTIDINE: 10 INJECTION, SOLUTION INTRAVENOUS at 07:57

## 2018-05-17 RX ADMIN — MORPHINE SULFATE 30 MG: 15 TABLET ORAL at 04:52

## 2018-05-17 RX ADMIN — MORPHINE SULFATE 30 MG: 15 TABLET ORAL at 16:23

## 2018-05-17 RX ADMIN — LORAZEPAM 1 MG: 2 INJECTION INTRAMUSCULAR; INTRAVENOUS at 05:58

## 2018-05-17 NOTE — PROGRESS NOTES
Bedside shift change report given to Rach (oncoming nurse) by Tee Montoya (offgoing nurse). Report included the following information SBAR, Kardex, Intake/Output, MAR and Recent Results.

## 2018-05-17 NOTE — PROCEDURES
[facility]  *** FINAL REPORT ***    Name: April Weeks  MRN: [id number]    Inpatient  : 15 Dec 1975  HIS Order #: [id number]  [address] [facility] Visit #: [id number]  Date: 17 May 2018    TYPE OF TEST: Peripheral Venous Testing    REASON FOR TEST  Pain in limb, Limb swelling    Right Leg:-  Deep venous thrombosis:           No  Superficial venous thrombosis:    No  Deep venous insufficiency:        Not examined  Superficial venous insufficiency: Not examined    Left Leg:-  Deep venous thrombosis:           No  Superficial venous thrombosis:    No  Deep venous insufficiency:        Not examined  Superficial venous insufficiency: Not examined      INTERPRETATION/FINDINGS  PROCEDURE:  Color duplex ultrasound imaging of lower extremity veins. FINDINGS:       Right: The common femoral, deep femoral, femoral, popliteal,  posterior tibial, peroneal, and great saphenous are patent and without   evidence of thrombus;  each is fully compressible and there is no  narrowing of the flow channel on color Doppler imaging. Phasic flow  is observed in the common femoral vein. Left:   The common femoral, deep femoral, femoral, popliteal,  posterior tibial, peroneal, and great saphenous are patent and without   evidence of thrombus;  each is fully compressible and there is no  narrowing of the flow channel on color Doppler imaging. Phasic flow  is observed in the common femoral vein. IMPRESSION:  No evidence of right or left lower extremity vein  thrombosis. ADDITIONAL COMMENTS    I have personally reviewed the data relevant to the interpretation of  this  study.     TECHNOLOGIST: SALTY Oneal, RCS  Signed: 2018 05:40 PM    PHYSICIAN: Yadiel Hutchinson MD  Signed: 2018 09:13 AM

## 2018-05-17 NOTE — PROGRESS NOTES
Problem: Mobility Impaired (Adult and Pediatric)  Goal: *Acute Goals and Plan of Care (Insert Text)  Physical Therapy Goals  Goals reassessed 5/11/2018 and upgraded to below:--Goals reassessed 5/17/18 and remain appropriate to continue  1. Patient will move from supine to sit and sit to supine  in bed with minimal assistance/contact guard assist NOT using chair position for bed within 7 day(s). 2.  Patient will transfer from bed to chair and chair to bed with supervision/set-up using the least restrictive device within 7 day(s). 3.  Patient will perform sit to stand with distant supervision/set-up within 7 day(s). 4.  Patient will ambulate with supervision/set-up for 300 feet with the least restrictive device within 7 day(s). Goals reassessed 5/4/18, updated and remain appropriate over next 7 days  Continue progress towards current goals 4/26/18  Update 4/18/2018  1. Patient will move from supine to sit and sit to supine  in bed with minimal assistance  within 7 day(s). MET on 5/4/18-Updated goal:  Patient will move from supine to sit and sit to supine  in bed with supervision within 7 day(s). 2.  Patient will transfer from bed to chair and chair to bed with minimal assistance  using the least restrictive device within 7 day(s). MET on 5/4/18-Updated goal:  Patient will transfer from bed to chair and chair to bed with supervision using the least restrictive device within 7 day(s). 3.  Patient will perform sit to stand with SBA within 7 days. MET  4. Patient will ambulate 150' with SBA x 1 using LRAD within 7 days. MET    Update 4/6/2018  1. Patient will move from supine to sit and sit to supine  in bed with moderate assistance  within 7 day(s). 2.  Patient will transfer from bed to chair and chair to bed with moderate assistance  using the least restrictive device within 7 day(s). 3.  Patient will perform sit to stand with CGA within 7 days  4.   Patient will ambulate 22' with moderate assist x 1 using LRAD within 7 days. Revised 3/27/2018  1. Patient will move from supine to sit and sit to supine  in bed with moderate assistance  within 7 day(s). 2.  Patient will transfer from bed to chair and chair to bed with moderate assistance  using the least restrictive device within 7 day(s). 3.  Patient will perform sit to stand with moderate assistance  within 7 day(s). 4.  Patient will ambulate 100' with moderate assist x1 using the least restrictive device within 7 day(s). Revisited 3/19/2018, goals remain appropriate, carry over    Physical Therapy Goals  Initiated 3/8/2018  1. Patient will move from supine to sit and sit to supine  in bed with moderate assistance  within 7 day(s). 2.  Patient will transfer from bed to chair and chair to bed with moderate assistance  using the least restrictive device within 7 day(s). 3.  Patient will perform sit to stand with moderate assistance  within 7 day(s). 4.  PT will assess gait with the least restrictive device within 7 day(s). physical Therapy TREATMENT: WEEKLY REASSESSMENT  Patient: Deion Griggs (38 y.o. female)  Date: 5/17/2018  Diagnosis: FISTULA  Small bowel fistula  INTEROCUTANOUS FISTULA <principal problem not specified>  Procedure(s) (LRB):  OPEN JEJUNOSTOMY TUBE PLACEMENT UNDER FLURO/ WOUND VAC CHANGE. (N/A) 35 Days Post-Op  Precautions: Fall, Skin, Contact  Chart, physical therapy assessment, plan of care and goals were reviewed. ASSESSMENT:  Patient received supine in bed and agreeable to therapy. Patient tolerated re-evaluation well and making good progress towards goals. Patient reported increased LE edema as well as L ankle pain limiting tolerance to activity. RN in room to give lasix to reduce edema. Patient completed sit<>Stand from chair position with standby assist and RW. Attempted gait with quad cane, however patient reported increased ankle pain and fear of legs giving way.  Utilized RW instead for gait training today. Patient ambulated with RW x 50 feet with CGA and conversation to distract from pain. Patient tolerated well and remained seated in chair to allow PCT to change her sheets. Discussed with patient home equipment that she has available to use and patient wishes to continue to try to use the quad cane to improve confidence. Patient will continue to benefit from PT to progress mobility and tolerance to activity. Patient will benefit from HHPT upon discharge. Patient's progression toward goals since last assessment: progress has been made towards all goals, but continue progress towards current goals     PLAN:  Goals have been updated based on progression since last assessment. Patient continues to benefit from skilled intervention to address the above impairments. Continue to follow the patient 5 times a week to address goals. Planned Interventions:  [x]              Bed Mobility Training             [x]       Neuromuscular Re-Education  [x]              Transfer Training                   []       Orthotic/Prosthetic Training  [x]              Gait Training                         []       Modalities  [x]              Therapeutic Exercises           []       Edema Management/Control  [x]              Therapeutic Activities            [x]       Patient and Family Training/Education  []              Other (comment):  Discharge Recommendations: Home Health  Further Equipment Recommendations for Discharge: none     SUBJECTIVE:   Patient stated \"My ankle is hurting me so much right now.     OBJECTIVE DATA SUMMARY:   Critical Behavior:  Neurologic State: Alert  Orientation Level: Oriented X4  Cognition: Appropriate decision making, Appropriate for age attention/concentration, Appropriate safety awareness, Follows commands  Safety/Judgement: Awareness of environment, Insight into deficits, Fall prevention    Strength:   Strength: Generally decreased, functional                      Functional Mobility Training:  Bed Mobility:           Scooting: Supervision (trendelenberg)        Transfers:  Sit to Stand: Stand-by assistance  Stand to Sit: Stand-by assistance                             Balance:  Sitting: Intact  Standing: Impaired; With support  Standing - Static: Good  Standing - Dynamic : Fair  Ambulation/Gait Training:  Distance (ft): 50 Feet (ft)  Assistive Device: Gait belt;Walker, rolling  Ambulation - Level of Assistance: Contact guard assistance        Gait Abnormalities: Decreased step clearance;Trunk sway increased        Base of Support: Widened     Speed/Liz: Pace decreased (<100 feet/min)  Step Length: Right shortened;Left shortened      Therapeutic Exercises: Ankle pumps/circles  Pain:  Pain Scale 1: Numeric (0 - 10)  Pain Intensity 1: 6  Pain Location 1: Abdomen  Pain Orientation 1: Mid;Upper  Pain Description 1: Aching  Pain Intervention(s) 1: Medication (see MAR)  Activity Tolerance:   Fair. VSS  Please refer to the flowsheet for vital signs taken during this treatment.   After treatment:   [x]  Patient left in no apparent distress sitting up in chair  []  Patient left in no apparent distress in bed  [x]  Call bell left within reach  [x]  Nursing notified  []  Caregiver present  []  Bed alarm activated    COMMUNICATION/COLLABORATION:   The patients plan of care was discussed with: Occupational Therapist and Registered Nurse    Lorene Alexis, PT, DPT   Time Calculation: 38 mins

## 2018-05-17 NOTE — PROGRESS NOTES
Problem: Self Care Deficits Care Plan (Adult)  Goal: *Acute Goals and Plan of Care (Insert Text)  Occupational Therapy Goals:  Weekly re-assessment- 5/11/18  1. Patient will complete bathing upper body to knees with setup within 7 days. CONT  2. Patient will complete bariatric BSC transfer with supervision bariatric RW within 7 days. CONT   3. Patient will complete lower body dressing activities with supervision within 7 days using AE. CONT  4. Patient will perform standing for clothing management tasks (toileting or dressing) supervision without AD support within 7 days. GOAL MET- upgraded  5. Patient will complete light resistance band UE exercise program in standing with supervision within 7 days. GOAL MET- upgraded    Goals reviewed and continued: 5/4/18  Revised 4/27/18  1. Patient will complete bathing upper body to knees with setup within 7 days upgraded 4/27  2. Patient will complete bariatric BSC transfer with supervision bariatric RW within 7 days. Upgraded 4/27  3. Patient will complete lower body dressing activities with supervision within 7 days using AE  4. Patient will perform standing for clothing management tasks (toileting or dressing) supervision bariatric RW within 7 days. Upgraded 4/27  5. Patient will complete light resistance band UE exercise program independently within 7 days. Upgraded 4/27/18    Reevaluated 4/20/2018  1. Patient will complete grooming activity sitting EOB without support for 10 minutes within 7 days  2. Patient will complete BSC transfer with min A x 1 persons within 7 days. 3. Patient will complete lower body dressing activities with supervision within 7 days using AE  4. Patient will perform standing for clothing management tasks (toileting or dressing) with min A x 1 persons within 7 days. 5. Patient will complete UE exercise program independently within 7 days. Reevaluated 4/10/2018  1.   Patient will complete grooming activity sitting EOB without support for 10 minutes within 7 days. 2.  Patient will complete BSC transfer with min A x 2 persons within 7 days. 3.  Patient will complete lower body dressing activities with min A within 7 days. 4.  Patient will perform standing for clothing management tasks (toileting or dressing) with min A x 2 persons within 7 days. 5.  Patient will complete UE exercise program independently within 7 days. Initiated 4/4/2018  1. Patient will perform rolling and pulling self up to head of bed with min assist to assist with ADL's at bed level within 7 day(s). 2.  Patient will perform static sitting edge of bed > or = 10 minutes with supervision/set-up within 7 day(s). 3.  Patient will perform static standing with bilateral UE support on rolling walker > or = 3 minutes with minimal assistance/contact guard assist x's 2 within 7 day(s). 4.  Patient will perform toilet transfers with moderate assistance x's 2 to bedside commode within 7 days. 5.  Patient will perform bilateral UE AROM and strengthening exercises throughout day and grade exercises prn to increase demand within 7 day(s). Occupational Therapy TREATMENT  Patient: Latonia Kirkpatrick (38 y.o. female)  Date: 5/17/2018  Diagnosis: FISTULA  Small bowel fistula  INTEROCUTANOUS FISTULA <principal problem not specified>  Procedure(s) (LRB):  OPEN JEJUNOSTOMY TUBE PLACEMENT UNDER FLURO/ WOUND VAC CHANGE. (N/A) 35 Days Post-Op  Precautions: Fall, Skin, Contact  Chart, occupational therapy assessment, plan of care, and goals were reviewed. ASSESSMENT:  Met with patient to discuss home setup for safety education, AE education, fall prevention, DME needs in prep for return home now with [facility] services, she was eager to discuss step by step each ADL and room needs on 1st floor of home, setup to sleep and complete ADLs in recliner/lift chair and in bathroom with bariatric grab bars for use.  Discussed ramp into home, car transfer/DME and use of RW in home for kitchen and carrying items with RW tray. Educated on energy conservation with building strength and endurance with home tasks including ADLs, reviewed use of AE, patient would like to trial sock aide and reacher for LB dressing next session in prep for return home soon, also would like to trial quad cane during an IADL standing task prior to discharge. Has all needed DME/AE at home, family going to get bed moved to 1st floor as able. Anxious about HH therapies, educated on advocating for self for time of day and female therapist one they call to setup. Declined OOB for ADLs or to practice standing ADLs, B LE edema noted post TPN. Recommend home with [facility] OT. Progression toward goals:  [x]       Improving appropriately and progressing toward goals  []       Improving slowly and progressing toward goals  []       Not making progress toward goals and plan of care will be adjusted     PLAN:  Patient continues to benefit from skilled intervention to address the above impairments. Continue treatment per established plan of care. Discharge Recommendations:  Home Health  Further Equipment Recommendations for Discharge:  None, reviewed all DME currently     SUBJECTIVE:   Patient stated I am ready I do want to not have the same PT man I had before.     OBJECTIVE DATA SUMMARY:   Cognitive/Behavioral Status:  Neurologic State: Alert                   Functional Mobility and Transfers for ADLs:  Bed Mobility:  Scooting: Supervision (trendelenberg)    Transfers:             Balance:       ADL Intervention:  Feeding  Feeding Assistance: Independent  Container Management: Independent  Food to Mouth: Independent  Drink to Mouth: Independent    Grooming  Grooming Assistance: Supervision/set up  Washing Face: Supervision/set-up  Washing Hands: Supervision/set-up  Brushing Teeth: Supervision/set-up  Brushing/Combing Hair: Supervision/set-up           Patient educated on use of adaptive equipment (ie.  Toilet tongs, Reacher, sock aid, long shoe horn, long handled sponge, and dressing stick) in order to complete LB dressing and BATHING, Patient instructed on technique to complete. Patient indicated understanding/recalled strategies with no cues. Reviewed use of current setup for toileting needs in and out of bathroom/ostomy care, educated on mobility into. out of bathroom, patients RW fits through bathroom and has 2 bariatric grab bars near toilet with setup of items needed to R side of toilet, use of female urinal and able to transfer on/off toilet usually without assist at home. Educated on standing tolerance as able to build strength and endurance in prep for IADLs especially in kitchen    Patient instructed and indicated understanding energy conservation techniques to increase independence and safety during ADLs with no visual handout provided. Reviewed use of RW tray for kitchen tasks, sitting for work task and safety for fall prevention, good understanding noted, brighter affect during this session, with improved confidence, educated on trial of quad can in small room setup for simulate use in bathroom at home (patient's goal)         Toileting  Bladder Hygiene: Supervision/set-up         Neuro Re-Education:           Therapeutic Exercises:   Reviewed theraband use, patient with good understanding For HEP would benefit from further review for HEP home carryover   Pain:  Pain Scale 1: Numeric (0 - 10)  Pain Intensity 1: 4  Pain Location 1: Abdomen  Pain Orientation 1: Mid;Upper  Pain Description 1: Aching  Pain Intervention(s) 1: Medication (see MAR)  Activity Tolerance:   good  Please refer to the flowsheet for vital signs taken during this treatment.   After treatment:   [] Patient left in no apparent distress sitting up in chair  [x] Patient left in no apparent distress in bed  [x] Call bell left within reach  [x] Nursing notified  [] Caregiver present  [] Bed alarm activated    COMMUNICATION/COLLABORATION:   The patients plan of care was discussed with: Physical Therapist, Physical Therapy Assistant and Registered Nurse    Yasmani Mello OT  Time Calculation: 47 mins

## 2018-05-17 NOTE — PROGRESS NOTES
Progress Note    Patient: Manuel Garcia MRN: [id number]  SSN: [id number]    YOB: 1975  Age: 38 y.o. Sex: female      Admit Date: 3/7/2018    35 Days Post-Op    Procedure:  Procedure(s):  OPEN JEJUNOSTOMY TUBE PLACEMENT UNDER FLURO/ WOUND VAC CHANGE. Subjective:     Patient complaining of leg swelling with right worse than left. Objective:     Visit Vitals    /76 (BP 1 Location: Right leg, BP Patient Position: At rest)    Pulse 94    Temp 98.8 °F (37.1 °C)    Resp 16    Ht 5' 4\" (1.626 m)    Wt 313 lb 0.9 oz (142 kg)    SpO2 99%    BMI 53.74 kg/m2       Temp (24hrs), Av.5 °F (36.9 °C), Min:98.2 °F (36.8 °C), Max:98.8 °F (37.1 °C)      Physical Exam:    Gen- Alert in NAD  Abd- soft nontender  Bilateral LE edema- Mild tenderness in the Left Calf     Data Review: images and reports reviewed    Lab Review: All lab results for the last 24 hours reviewed.   Recent Results (from the past 24 hour(s))   GLUCOSE, POC    Collection Time: 18 10:15 PM   Result Value Ref Range    Glucose (POC) 178 (H) 65 - 100 mg/dL    Performed by Nita Torres    CBC W/O DIFF    Collection Time: 18  5:29 AM   Result Value Ref Range    WBC 7.9 3.6 - 11.0 K/uL    RBC 2.79 (L) 3.80 - 5.20 M/uL    HGB 8.0 (L) 11.5 - 16.0 g/dL    HCT 26.4 (L) 35.0 - 47.0 %    MCV 94.6 80.0 - 99.0 FL    MCH 28.7 26.0 - 34.0 PG    MCHC 30.3 30.0 - 36.5 g/dL    RDW 17.0 (H) 11.5 - 14.5 %    PLATELET 862 397 - 085 K/uL    MPV 9.2 8.9 - 12.9 FL    NRBC 0.0 0  WBC    ABSOLUTE NRBC 0.00 0.00 - 9.79 K/uL   METABOLIC PANEL, BASIC    Collection Time: 18  5:29 AM   Result Value Ref Range    Sodium 133 (L) 136 - 145 mmol/L    Potassium 4.7 3.5 - 5.1 mmol/L    Chloride 108 97 - 108 mmol/L    CO2 16 (L) 21 - 32 mmol/L    Anion gap 9 5 - 15 mmol/L    Glucose 193 (H) 65 - 100 mg/dL    BUN 35 (H) 6 - 20 MG/DL    Creatinine 0.86 0.55 - 1.02 MG/DL    BUN/Creatinine ratio 41 (H) 12 - 20      GFR est AA >60 >60 ml/min/1.73m2    GFR est non-AA >60 >60 ml/min/1.73m2    Calcium 9.3 8.5 - 10.1 MG/DL   MAGNESIUM    Collection Time: 05/17/18  5:29 AM   Result Value Ref Range    Magnesium 1.7 1.6 - 2.4 mg/dL   PHOSPHORUS    Collection Time: 05/17/18  5:29 AM   Result Value Ref Range    Phosphorus 1.9 (L) 2.6 - 4.7 MG/DL   GLUCOSE, POC    Collection Time: 05/17/18 10:19 AM   Result Value Ref Range    Glucose (POC) 131 (H) 65 - 100 mg/dL    Performed by Heather Stone        Assessment:     Hospital Problems  Date Reviewed: 10/27/2017          Codes Class Noted POA    Small bowel fistula ICD-10-CM: K63.2  ICD-9-CM: 569.81  3/7/2018 Unknown              Plan/Recommendations/Medical Decision Making: Will obtain LE duplex to R/o DVT  Continue TPN  Will give short course of Lasix. Continue dressing changes  Hopefully home with Home PT and Home nursing for TPN and dressing changes , by the beginning of next week.      Signed By: Aaliyah Pak MD     May 17, 2018

## 2018-05-17 NOTE — PROGRESS NOTES
Bedside and Verbal shift change report given to Martin Garnica (oncoming nurse) by Gilda Whiteside (offgoing nurse). Report included the following information SBAR, Kardex, Procedure Summary, Intake/Output, MAR, Accordion and Recent Results.

## 2018-05-18 LAB
GLUCOSE BLD STRIP.AUTO-MCNC: 146 MG/DL (ref 65–100)
GLUCOSE BLD STRIP.AUTO-MCNC: 195 MG/DL (ref 65–100)
SERVICE CMNT-IMP: ABNORMAL
SERVICE CMNT-IMP: ABNORMAL

## 2018-05-18 PROCEDURE — 74011250636 HC RX REV CODE- 250/636: Performed by: SURGERY

## 2018-05-18 PROCEDURE — 74011250636 HC RX REV CODE- 250/636: Performed by: NURSE PRACTITIONER

## 2018-05-18 PROCEDURE — 77030010520

## 2018-05-18 PROCEDURE — 74011250637 HC RX REV CODE- 250/637: Performed by: NURSE PRACTITIONER

## 2018-05-18 PROCEDURE — 65270000029 HC RM PRIVATE

## 2018-05-18 PROCEDURE — 74011000250 HC RX REV CODE- 250: Performed by: SURGERY

## 2018-05-18 PROCEDURE — 74011636637 HC RX REV CODE- 636/637: Performed by: SURGERY

## 2018-05-18 PROCEDURE — 74011250637 HC RX REV CODE- 250/637: Performed by: PHYSICAL MEDICINE & REHABILITATION

## 2018-05-18 PROCEDURE — 82962 GLUCOSE BLOOD TEST: CPT

## 2018-05-18 PROCEDURE — 74011250637 HC RX REV CODE- 250/637: Performed by: PHYSICIAN ASSISTANT

## 2018-05-18 PROCEDURE — 74011000258 HC RX REV CODE- 258: Performed by: SURGERY

## 2018-05-18 PROCEDURE — 77030018717 HC DRSG GRNUFM KCON -B

## 2018-05-18 RX ADMIN — ONDANSETRON HYDROCHLORIDE 4 MG: 2 INJECTION INTRAMUSCULAR; INTRAVENOUS at 09:03

## 2018-05-18 RX ADMIN — Medication 10 ML: at 13:25

## 2018-05-18 RX ADMIN — ENOXAPARIN SODIUM 40 MG: 100 INJECTION SUBCUTANEOUS at 09:03

## 2018-05-18 RX ADMIN — DRONABINOL 2.5 MG: 2.5 CAPSULE ORAL at 08:47

## 2018-05-18 RX ADMIN — MORPHINE SULFATE 30 MG: 15 TABLET ORAL at 10:05

## 2018-05-18 RX ADMIN — ASPIRIN 325 MG: 325 TABLET ORAL at 08:47

## 2018-05-18 RX ADMIN — Medication 10 ML: at 21:49

## 2018-05-18 RX ADMIN — ASCORBIC ACID: 500 INJECTION, SOLUTION INTRAMUSCULAR; INTRAVENOUS; SUBCUTANEOUS at 19:31

## 2018-05-18 RX ADMIN — Medication 10 ML: at 09:06

## 2018-05-18 RX ADMIN — MORPHINE SULFATE 30 MG: 15 TABLET ORAL at 12:08

## 2018-05-18 RX ADMIN — Medication 10 ML: at 05:45

## 2018-05-18 RX ADMIN — DRONABINOL 2.5 MG: 2.5 CAPSULE ORAL at 18:41

## 2018-05-18 RX ADMIN — FUROSEMIDE 20 MG: 10 INJECTION, SOLUTION INTRAMUSCULAR; INTRAVENOUS at 08:49

## 2018-05-18 RX ADMIN — MORPHINE SULFATE 100 MG: 100 TABLET, FILM COATED, EXTENDED RELEASE ORAL at 21:49

## 2018-05-18 RX ADMIN — MORPHINE SULFATE 100 MG: 100 TABLET, FILM COATED, EXTENDED RELEASE ORAL at 05:43

## 2018-05-18 RX ADMIN — ASCORBIC ACID: 500 INJECTION, SOLUTION INTRAMUSCULAR; INTRAVENOUS; SUBCUTANEOUS at 18:36

## 2018-05-18 RX ADMIN — LORAZEPAM 1 MG: 2 INJECTION INTRAMUSCULAR; INTRAVENOUS at 20:33

## 2018-05-18 RX ADMIN — MORPHINE SULFATE 30 MG: 15 TABLET ORAL at 19:29

## 2018-05-18 RX ADMIN — MORPHINE SULFATE 30 MG: 15 TABLET ORAL at 08:48

## 2018-05-18 RX ADMIN — MORPHINE SULFATE 30 MG: 15 TABLET ORAL at 22:39

## 2018-05-18 RX ADMIN — MORPHINE SULFATE 100 MG: 100 TABLET, FILM COATED, EXTENDED RELEASE ORAL at 13:25

## 2018-05-18 RX ADMIN — MORPHINE SULFATE 30 MG: 15 TABLET ORAL at 05:43

## 2018-05-18 RX ADMIN — NYSTATIN: 100000 CREAM TOPICAL at 18:39

## 2018-05-18 RX ADMIN — Medication 10 ML: at 09:03

## 2018-05-18 RX ADMIN — MORPHINE SULFATE 30 MG: 15 TABLET ORAL at 14:11

## 2018-05-18 RX ADMIN — MORPHINE SULFATE 30 MG: 15 TABLET ORAL at 16:53

## 2018-05-18 RX ADMIN — MORPHINE SULFATE 30 MG: 15 TABLET ORAL at 01:57

## 2018-05-18 RX ADMIN — NYSTATIN: 100000 CREAM TOPICAL at 08:50

## 2018-05-18 RX ADMIN — I.V. FAT EMULSION 250 ML: 20 EMULSION INTRAVENOUS at 18:38

## 2018-05-18 NOTE — PROGRESS NOTES
Bedside shift change report given to Kai Marin (oncoming nurse) by Danisha Boston (offgoing nurse). Report included the following information SBAR, Kardex, Intake/Output, MAR and Recent Results.

## 2018-05-18 NOTE — PROGRESS NOTES
No complaints this AM.  Tm 98.8 HR: 105 BP: 98/65 Resp Rate: 20 100% sat on room air. Intake/Output Summary (Last 24 hours) at 05/18/18 0857  Last data filed at 05/18/18 0650   Gross per 24 hour   Intake          9777.92 ml   Output             4860 ml   Net          4917.92 ml   Exam: Abdomen - Soft. Non tender. No guarding or rebound. Enterocutaneous fistula is well controlled. Labs:   Recent Results (from the past 12 hour(s))   GLUCOSE, POC    Collection Time: 05/17/18  9:44 PM   Result Value Ref Range    Glucose (POC) 207 (H) 65 - 100 mg/dL    Performed by Laverne Gaspar venous duplex - No DVT. Diet and supplements as tolerated. Will renew cyclic TPN and Lipids. PT and OT following. Palliative Care following for pain management. DVT Prophylaxis - Lovenox. Discharge planning.

## 2018-05-18 NOTE — PROGRESS NOTES
I have read and reviewed and agree with ailyn RN charting. Bedside and Verbal shift change report given to [address] (oncoming nurse) by Bill Mcburney (offgoing nurse). Report included the following information SBAR, Kardex, Procedure Summary, Intake/Output, MAR, Accordion and Recent Results.

## 2018-05-18 NOTE — PROGRESS NOTES
Bedside shift change report given to [facility] RN by Mera Noriega Rn/Jaymie Rn. Report included the following information SBAR, Kardex, Procedure Summary, Intake/Output, MAR and Recent Results.

## 2018-05-18 NOTE — HOME CARE
Patient's certification period with [facility] ended 4/30/18. If patient discharges from [address] and needs home health the agency will need a new home health referral and complete orders. Message left on  Tenzin Gomez voice mail 5/18/18 with update.  Deep Hunter RN Liaison

## 2018-05-18 NOTE — PROGRESS NOTES
Note that the patient may be able to discharge home next week. New home health orders will need to be sent to [facility]. She will also need home TPN and possible ambulance transportation home. Care Management will continue to follow her disposition and assist with care coordination once she is closer to being medically stable for discharge.    Sandra Patterson, ROBERT

## 2018-05-18 NOTE — PROGRESS NOTES
Problem: Falls - Risk of  Goal: *Absence of Falls  Document Marlene Fall Risk and appropriate interventions in the flowsheet. Outcome: Progressing Towards Goal  Fall Risk Interventions:  Mobility Interventions: Patient to call before getting OOB         Medication Interventions: Patient to call before getting OOB    Elimination Interventions: Urinal in reach    History of Falls Interventions: Consult care management for discharge planning        Problem: Surgical Wound Care  Goal: *Non-infected Wound: Absence of infection signs and symptoms  Infection control procedures (eg: clean dressings, clean gloves, hand washing, precautions to isolate wound from contamination, sterile instruments used for wound debridement) should be implemented. Received patient from previous RN via bedside shift report. Patient is resting comfortably; A&Ox4. AVSS, NAD noted at this time. Abd dressing remains CDI, no leaking. Full assessment into epic. Patient updated on current POC- verbalized understanding. All safety precautions in place- safety maintained. Will continue to monitor.

## 2018-05-18 NOTE — WOUND CARE
Seen today with Evangelist CHIANG. Full pouch change with same technique as before - Mother was present and able to review pouching process for home. Both [place] and her mother feel comfortable managing this. No change in wound bed since assessment two days ago - see note. Skin surrounding the wound is intact and without redness. Will continue to follow while admitted.   AMPARO Heath

## 2018-05-19 LAB
GLUCOSE BLD STRIP.AUTO-MCNC: 130 MG/DL (ref 65–100)
GLUCOSE BLD STRIP.AUTO-MCNC: 135 MG/DL (ref 65–100)
GLUCOSE BLD STRIP.AUTO-MCNC: 142 MG/DL (ref 65–100)
SERVICE CMNT-IMP: ABNORMAL

## 2018-05-19 PROCEDURE — 74011000258 HC RX REV CODE- 258: Performed by: SURGERY

## 2018-05-19 PROCEDURE — 74011636637 HC RX REV CODE- 636/637: Performed by: SURGERY

## 2018-05-19 PROCEDURE — 74011000250 HC RX REV CODE- 250: Performed by: SURGERY

## 2018-05-19 PROCEDURE — 82962 GLUCOSE BLOOD TEST: CPT

## 2018-05-19 PROCEDURE — 74011250637 HC RX REV CODE- 250/637: Performed by: PHYSICAL MEDICINE & REHABILITATION

## 2018-05-19 PROCEDURE — 74011250637 HC RX REV CODE- 250/637: Performed by: PHYSICIAN ASSISTANT

## 2018-05-19 PROCEDURE — 74011250637 HC RX REV CODE- 250/637: Performed by: NURSE PRACTITIONER

## 2018-05-19 PROCEDURE — 65270000029 HC RM PRIVATE

## 2018-05-19 PROCEDURE — 74011250636 HC RX REV CODE- 250/636: Performed by: NURSE PRACTITIONER

## 2018-05-19 PROCEDURE — 74011250636 HC RX REV CODE- 250/636: Performed by: SURGERY

## 2018-05-19 RX ADMIN — Medication 10 ML: at 21:39

## 2018-05-19 RX ADMIN — ENOXAPARIN SODIUM 40 MG: 100 INJECTION SUBCUTANEOUS at 09:33

## 2018-05-19 RX ADMIN — MORPHINE SULFATE 30 MG: 15 TABLET ORAL at 19:00

## 2018-05-19 RX ADMIN — MORPHINE SULFATE 100 MG: 100 TABLET, FILM COATED, EXTENDED RELEASE ORAL at 21:27

## 2018-05-19 RX ADMIN — ASCORBIC ACID: 500 INJECTION, SOLUTION INTRAMUSCULAR; INTRAVENOUS; SUBCUTANEOUS at 19:03

## 2018-05-19 RX ADMIN — MORPHINE SULFATE 100 MG: 100 TABLET, FILM COATED, EXTENDED RELEASE ORAL at 07:26

## 2018-05-19 RX ADMIN — ASCORBIC ACID: 500 INJECTION, SOLUTION INTRAMUSCULAR; INTRAVENOUS; SUBCUTANEOUS at 08:18

## 2018-05-19 RX ADMIN — MORPHINE SULFATE 30 MG: 15 TABLET ORAL at 01:35

## 2018-05-19 RX ADMIN — DRONABINOL 2.5 MG: 2.5 CAPSULE ORAL at 17:28

## 2018-05-19 RX ADMIN — MORPHINE SULFATE 30 MG: 15 TABLET ORAL at 04:37

## 2018-05-19 RX ADMIN — LORAZEPAM 1 MG: 2 INJECTION INTRAMUSCULAR; INTRAVENOUS at 21:27

## 2018-05-19 RX ADMIN — Medication 10 ML: at 13:00

## 2018-05-19 RX ADMIN — MORPHINE SULFATE 30 MG: 15 TABLET ORAL at 09:33

## 2018-05-19 RX ADMIN — ONDANSETRON HYDROCHLORIDE 4 MG: 2 INJECTION INTRAMUSCULAR; INTRAVENOUS at 09:33

## 2018-05-19 RX ADMIN — MORPHINE SULFATE 30 MG: 15 TABLET ORAL at 21:35

## 2018-05-19 RX ADMIN — MORPHINE SULFATE 100 MG: 100 TABLET, FILM COATED, EXTENDED RELEASE ORAL at 13:51

## 2018-05-19 RX ADMIN — MORPHINE SULFATE 30 MG: 15 TABLET ORAL at 12:58

## 2018-05-19 RX ADMIN — Medication 10 ML: at 07:27

## 2018-05-19 RX ADMIN — Medication 10 ML: at 07:26

## 2018-05-19 RX ADMIN — ASPIRIN 325 MG: 325 TABLET ORAL at 09:33

## 2018-05-19 RX ADMIN — Medication 10 ML: at 09:34

## 2018-05-19 RX ADMIN — MORPHINE SULFATE 30 MG: 15 TABLET ORAL at 16:02

## 2018-05-19 RX ADMIN — DRONABINOL 2.5 MG: 2.5 CAPSULE ORAL at 09:33

## 2018-05-19 RX ADMIN — ONDANSETRON HYDROCHLORIDE 4 MG: 2 INJECTION INTRAMUSCULAR; INTRAVENOUS at 07:26

## 2018-05-19 RX ADMIN — MORPHINE SULFATE 30 MG: 15 TABLET ORAL at 07:19

## 2018-05-19 RX ADMIN — FUROSEMIDE 20 MG: 10 INJECTION, SOLUTION INTRAMUSCULAR; INTRAVENOUS at 09:33

## 2018-05-19 NOTE — PROGRESS NOTES
Problem: Falls - Risk of  Goal: *Absence of Falls  Document Marlene Fall Risk and appropriate interventions in the flowsheet. Outcome: Progressing Towards Goal  Fall Risk Interventions:  Mobility Interventions: Patient to call before getting OOB         Medication Interventions: Patient to call before getting OOB    Elimination Interventions: Urinal in reach    History of Falls Interventions: Consult care management for discharge planning        Problem: Small Bowel Obstruction: Day 1  Goal: Medications  Received patient from previous RN via bedside shift report. Patient is resting comfortably; A&Ox4. AVSS, NAD noted at this time. Abd remain CDI, no leaking. Full assessment into epic. Patient updated on current POC- verbalized understanding. All safety precautions in place- safety maintained. Will continue to monitor.

## 2018-05-19 NOTE — PROGRESS NOTES
Progress Note    Patient: Kristy Weeks MRN: [id number]  SSN: [id number]    YOB: 1975  Age: 38 y.o. Sex: female      Admit Date: 3/7/2018    37 Days Post-Op    Procedure:  Procedure(s):  OPEN JEJUNOSTOMY TUBE PLACEMENT UNDER FLURO/ WOUND VAC CHANGE. Subjective:     No acute surgical issues. Pain is under control. Pt tolerating diet. Wound appliance is intact.     Objective:     Visit Vitals    /73 (BP 1 Location: Right leg, BP Patient Position: At rest)    Pulse (!) 102    Temp 98.7 °F (37.1 °C)    Resp 17    Ht 5' 4\" (1.626 m)    Wt 310 lb 13.6 oz (141 kg)    SpO2 99%    BMI 53.36 kg/m2       Temp (24hrs), Av.1 °F (37.3 °C), Min:98.7 °F (37.1 °C), Max:99.4 °F (37.4 °C)        Physical Exam:    Gen:  NAD  Pulm:  Unlabored  Abd:  S/ND/appropriate TTP  Wound:  C/D/I  Fistula with feculent output    Recent Results (from the past 24 hour(s))   GLUCOSE, POC    Collection Time: 18 12:45 PM   Result Value Ref Range    Glucose (POC) 146 (H) 65 - 100 mg/dL    Performed by Flakita Barrera    GLUCOSE, POC    Collection Time: 18  9:21 PM   Result Value Ref Range    Glucose (POC) 195 (H) 65 - 100 mg/dL    Performed by Odell Vidal        Assessment:     Hospital Problems  Date Reviewed: 10/27/2017          Codes Class Noted POA    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

## 2018-05-20 LAB
GLUCOSE BLD STRIP.AUTO-MCNC: 153 MG/DL (ref 65–100)
GLUCOSE BLD STRIP.AUTO-MCNC: 162 MG/DL (ref 65–100)
GLUCOSE BLD STRIP.AUTO-MCNC: 191 MG/DL (ref 65–100)
SERVICE CMNT-IMP: ABNORMAL

## 2018-05-20 PROCEDURE — 74011000250 HC RX REV CODE- 250: Performed by: SURGERY

## 2018-05-20 PROCEDURE — 74011636637 HC RX REV CODE- 636/637: Performed by: SURGERY

## 2018-05-20 PROCEDURE — 74011000258 HC RX REV CODE- 258: Performed by: SURGERY

## 2018-05-20 PROCEDURE — 74011250637 HC RX REV CODE- 250/637: Performed by: PHYSICIAN ASSISTANT

## 2018-05-20 PROCEDURE — 74011250636 HC RX REV CODE- 250/636: Performed by: NURSE PRACTITIONER

## 2018-05-20 PROCEDURE — 77030010520

## 2018-05-20 PROCEDURE — 82962 GLUCOSE BLOOD TEST: CPT

## 2018-05-20 PROCEDURE — 74011250637 HC RX REV CODE- 250/637: Performed by: PHYSICAL MEDICINE & REHABILITATION

## 2018-05-20 PROCEDURE — 74011250636 HC RX REV CODE- 250/636: Performed by: SURGERY

## 2018-05-20 PROCEDURE — 65270000029 HC RM PRIVATE

## 2018-05-20 PROCEDURE — 74011250637 HC RX REV CODE- 250/637: Performed by: NURSE PRACTITIONER

## 2018-05-20 RX ADMIN — MORPHINE SULFATE 30 MG: 15 TABLET ORAL at 01:04

## 2018-05-20 RX ADMIN — LORAZEPAM 1 MG: 2 INJECTION INTRAMUSCULAR; INTRAVENOUS at 20:38

## 2018-05-20 RX ADMIN — MORPHINE SULFATE 30 MG: 15 TABLET ORAL at 17:29

## 2018-05-20 RX ADMIN — MORPHINE SULFATE 100 MG: 100 TABLET, FILM COATED, EXTENDED RELEASE ORAL at 06:12

## 2018-05-20 RX ADMIN — ASPIRIN 325 MG: 325 TABLET ORAL at 09:02

## 2018-05-20 RX ADMIN — Medication 10 ML: at 22:35

## 2018-05-20 RX ADMIN — MORPHINE SULFATE 30 MG: 15 TABLET ORAL at 04:43

## 2018-05-20 RX ADMIN — MORPHINE SULFATE 100 MG: 100 TABLET, FILM COATED, EXTENDED RELEASE ORAL at 13:58

## 2018-05-20 RX ADMIN — MORPHINE SULFATE 30 MG: 15 TABLET ORAL at 19:30

## 2018-05-20 RX ADMIN — ENOXAPARIN SODIUM 40 MG: 100 INJECTION SUBCUTANEOUS at 09:02

## 2018-05-20 RX ADMIN — MORPHINE SULFATE 100 MG: 100 TABLET, FILM COATED, EXTENDED RELEASE ORAL at 22:34

## 2018-05-20 RX ADMIN — ASCORBIC ACID: 500 INJECTION, SOLUTION INTRAMUSCULAR; INTRAVENOUS; SUBCUTANEOUS at 18:51

## 2018-05-20 RX ADMIN — MORPHINE SULFATE 30 MG: 15 TABLET ORAL at 11:05

## 2018-05-20 RX ADMIN — MORPHINE SULFATE 30 MG: 15 TABLET ORAL at 07:33

## 2018-05-20 RX ADMIN — ASCORBIC ACID: 500 INJECTION, SOLUTION INTRAMUSCULAR; INTRAVENOUS; SUBCUTANEOUS at 06:57

## 2018-05-20 RX ADMIN — DRONABINOL 2.5 MG: 2.5 CAPSULE ORAL at 17:29

## 2018-05-20 RX ADMIN — Medication 10 ML: at 11:07

## 2018-05-20 RX ADMIN — MORPHINE SULFATE 30 MG: 15 TABLET ORAL at 22:34

## 2018-05-20 RX ADMIN — DRONABINOL 2.5 MG: 2.5 CAPSULE ORAL at 09:01

## 2018-05-20 RX ADMIN — MORPHINE SULFATE 30 MG: 15 TABLET ORAL at 13:57

## 2018-05-20 RX ADMIN — Medication 10 ML: at 06:13

## 2018-05-20 NOTE — PROGRESS NOTES
Bedside shift change report given to Charles Aguilar (oncoming nurse) by Jaqueline Ramsey RN (offgoing nurse). Report included the following information SBAR, Kardex, ED Summary, Intake/Output, MAR, Accordion and Recent Results.

## 2018-05-20 NOTE — PROGRESS NOTES
Progress Note    Patient: Marsha Giraldo MRN: [id number]  SSN: [id number]    YOB: 1975  Age: 38 y.o. Sex: female      Admit Date: 3/7/2018    38 Days Post-Op    Procedure:  Procedure(s):  OPEN JEJUNOSTOMY TUBE PLACEMENT UNDER FLURO/ WOUND VAC CHANGE. Subjective:     No acute surgical issues. Pain is under control. Pt tolerating diet. Wound appliance is intact but leaked last night.     Objective:     Visit Vitals    /78 (BP 1 Location: Right leg, BP Patient Position: At rest)    Pulse 98    Temp 98.7 °F (37.1 °C)    Resp 16    Ht 5' 4\" (1.626 m)    Wt 310 lb 13.6 oz (141 kg)    SpO2 98%    BMI 53.36 kg/m2       Temp (24hrs), Av.6 °F (37 °C), Min:98.5 °F (36.9 °C), Max:98.7 °F (37.1 °C)        Physical Exam:    Gen:  NAD  Pulm:  Unlabored  Abd:  S/ND/appropriate TTP  Wound:  C/D/I  Fistula with feculent output    Recent Results (from the past 24 hour(s))   GLUCOSE, POC    Collection Time: 18 11:31 AM   Result Value Ref Range    Glucose (POC) 142 (H) 65 - 100 mg/dL    Performed by [employer]    GLUCOSE, POC    Collection Time: 18  5:51 PM   Result Value Ref Range    Glucose (POC) 130 (H) 65 - 100 mg/dL    Performed by [employer]    GLUCOSE, POC    Collection Time: 18 10:05 PM   Result Value Ref Range    Glucose (POC) 135 (H) 65 - 100 mg/dL    Performed by [employer]    GLUCOSE, POC    Collection Time: 18  6:20 AM   Result Value Ref Range    Glucose (POC) 191 (H) 65 - 100 mg/dL    Performed by Denise Boyd        Assessment:     Hospital Problems  Date Reviewed: 10/27/2017          Codes Class Noted POA    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

## 2018-05-21 LAB
25(OH)D2 SERPL-MCNC: 5.9 NG/ML
25(OH)D3 SERPL-MCNC: 11 NG/ML
25(OH)D3+25(OH)D2 SERPL-MCNC: 17 NG/ML
ALBUMIN SERPL-MCNC: 1.8 G/DL (ref 3.5–5)
ALBUMIN/GLOB SERPL: 0.4 {RATIO} (ref 1.1–2.2)
ALP SERPL-CCNC: 176 U/L (ref 45–117)
ALT SERPL-CCNC: 40 U/L (ref 12–78)
ANION GAP SERPL CALC-SCNC: 10 MMOL/L (ref 5–15)
AST SERPL-CCNC: 27 U/L (ref 15–37)
BILIRUB SERPL-MCNC: 0.3 MG/DL (ref 0.2–1)
BUN SERPL-MCNC: 32 MG/DL (ref 6–20)
BUN/CREAT SERPL: 42 (ref 12–20)
CALCIUM SERPL-MCNC: 9.3 MG/DL (ref 8.5–10.1)
CHLORIDE SERPL-SCNC: 112 MMOL/L (ref 97–108)
CO2 SERPL-SCNC: 16 MMOL/L (ref 21–32)
CREAT SERPL-MCNC: 0.76 MG/DL (ref 0.55–1.02)
ERYTHROCYTE [DISTWIDTH] IN BLOOD BY AUTOMATED COUNT: 17.1 % (ref 11.5–14.5)
GLOBULIN SER CALC-MCNC: 5 G/DL (ref 2–4)
GLUCOSE BLD STRIP.AUTO-MCNC: 187 MG/DL (ref 65–100)
GLUCOSE SERPL-MCNC: 172 MG/DL (ref 65–100)
HCT VFR BLD AUTO: 24.5 % (ref 35–47)
HGB BLD-MCNC: 7.4 G/DL (ref 11.5–16)
MCH RBC QN AUTO: 28 PG (ref 26–34)
MCHC RBC AUTO-ENTMCNC: 30.2 G/DL (ref 30–36.5)
MCV RBC AUTO: 92.8 FL (ref 80–99)
NRBC # BLD: 0.02 K/UL (ref 0–0.01)
NRBC BLD-RTO: 0.2 PER 100 WBC
PLATELET # BLD AUTO: 300 K/UL (ref 150–400)
PMV BLD AUTO: 9.4 FL (ref 8.9–12.9)
POTASSIUM SERPL-SCNC: 3.6 MMOL/L (ref 3.5–5.1)
PROT SERPL-MCNC: 6.8 G/DL (ref 6.4–8.2)
RBC # BLD AUTO: 2.64 M/UL (ref 3.8–5.2)
SERVICE CMNT-IMP: ABNORMAL
SODIUM SERPL-SCNC: 138 MMOL/L (ref 136–145)
WBC # BLD AUTO: 10.3 K/UL (ref 3.6–11)

## 2018-05-21 PROCEDURE — 74011000258 HC RX REV CODE- 258: Performed by: SURGERY

## 2018-05-21 PROCEDURE — 74011250637 HC RX REV CODE- 250/637: Performed by: NURSE PRACTITIONER

## 2018-05-21 PROCEDURE — 74011000250 HC RX REV CODE- 250: Performed by: SURGERY

## 2018-05-21 PROCEDURE — 74011250636 HC RX REV CODE- 250/636: Performed by: SURGERY

## 2018-05-21 PROCEDURE — 77030011641 HC PASTE OST ADH BMS -A

## 2018-05-21 PROCEDURE — 85027 COMPLETE CBC AUTOMATED: CPT | Performed by: SURGERY

## 2018-05-21 PROCEDURE — 77030010520

## 2018-05-21 PROCEDURE — 80053 COMPREHEN METABOLIC PANEL: CPT | Performed by: SURGERY

## 2018-05-21 PROCEDURE — 74011250636 HC RX REV CODE- 250/636: Performed by: NURSE PRACTITIONER

## 2018-05-21 PROCEDURE — 97116 GAIT TRAINING THERAPY: CPT

## 2018-05-21 PROCEDURE — 97535 SELF CARE MNGMENT TRAINING: CPT

## 2018-05-21 PROCEDURE — 65270000029 HC RM PRIVATE

## 2018-05-21 PROCEDURE — 74011636637 HC RX REV CODE- 636/637: Performed by: SURGERY

## 2018-05-21 PROCEDURE — 36415 COLL VENOUS BLD VENIPUNCTURE: CPT | Performed by: SURGERY

## 2018-05-21 PROCEDURE — 82962 GLUCOSE BLOOD TEST: CPT

## 2018-05-21 PROCEDURE — 74011250637 HC RX REV CODE- 250/637: Performed by: PHYSICAL MEDICINE & REHABILITATION

## 2018-05-21 PROCEDURE — 74011250637 HC RX REV CODE- 250/637: Performed by: PHYSICIAN ASSISTANT

## 2018-05-21 PROCEDURE — 97530 THERAPEUTIC ACTIVITIES: CPT

## 2018-05-21 RX ADMIN — DRONABINOL 2.5 MG: 2.5 CAPSULE ORAL at 08:40

## 2018-05-21 RX ADMIN — MORPHINE SULFATE 30 MG: 15 TABLET ORAL at 01:31

## 2018-05-21 RX ADMIN — MORPHINE SULFATE 100 MG: 100 TABLET, FILM COATED, EXTENDED RELEASE ORAL at 13:33

## 2018-05-21 RX ADMIN — MORPHINE SULFATE 30 MG: 15 TABLET ORAL at 07:26

## 2018-05-21 RX ADMIN — ENOXAPARIN SODIUM 40 MG: 100 INJECTION SUBCUTANEOUS at 10:36

## 2018-05-21 RX ADMIN — I.V. FAT EMULSION 250 ML: 20 EMULSION INTRAVENOUS at 19:28

## 2018-05-21 RX ADMIN — MORPHINE SULFATE 30 MG: 15 TABLET ORAL at 13:33

## 2018-05-21 RX ADMIN — Medication 10 ML: at 07:27

## 2018-05-21 RX ADMIN — PROCHLORPERAZINE EDISYLATE 5 MG: 5 INJECTION INTRAMUSCULAR; INTRAVENOUS at 08:40

## 2018-05-21 RX ADMIN — Medication 10 ML: at 22:18

## 2018-05-21 RX ADMIN — MORPHINE SULFATE 30 MG: 15 TABLET ORAL at 19:24

## 2018-05-21 RX ADMIN — ONDANSETRON HYDROCHLORIDE 4 MG: 2 INJECTION INTRAMUSCULAR; INTRAVENOUS at 06:13

## 2018-05-21 RX ADMIN — MORPHINE SULFATE 30 MG: 15 TABLET ORAL at 04:34

## 2018-05-21 RX ADMIN — ONDANSETRON HYDROCHLORIDE 4 MG: 2 INJECTION INTRAMUSCULAR; INTRAVENOUS at 21:39

## 2018-05-21 RX ADMIN — DRONABINOL 2.5 MG: 2.5 CAPSULE ORAL at 18:35

## 2018-05-21 RX ADMIN — PROCHLORPERAZINE EDISYLATE 5 MG: 5 INJECTION INTRAMUSCULAR; INTRAVENOUS at 16:13

## 2018-05-21 RX ADMIN — MORPHINE SULFATE 100 MG: 100 TABLET, FILM COATED, EXTENDED RELEASE ORAL at 07:27

## 2018-05-21 RX ADMIN — Medication 10 ML: at 13:36

## 2018-05-21 RX ADMIN — MORPHINE SULFATE 30 MG: 15 TABLET ORAL at 22:17

## 2018-05-21 RX ADMIN — MORPHINE SULFATE 100 MG: 100 TABLET, FILM COATED, EXTENDED RELEASE ORAL at 22:17

## 2018-05-21 RX ADMIN — MORPHINE SULFATE 30 MG: 15 TABLET ORAL at 10:38

## 2018-05-21 RX ADMIN — ASCORBIC ACID: 500 INJECTION, SOLUTION INTRAMUSCULAR; INTRAVENOUS; SUBCUTANEOUS at 19:31

## 2018-05-21 RX ADMIN — ASPIRIN 325 MG: 325 TABLET ORAL at 08:40

## 2018-05-21 RX ADMIN — MORPHINE SULFATE 30 MG: 15 TABLET ORAL at 16:14

## 2018-05-21 NOTE — PROGRESS NOTES
Bedside and Verbal shift change report given to Grady Fabry, RN (oncoming nurse) by Laurie Syed RN (offgoing nurse). Report included the following information SBAR, Kardex, ED Summary, Intake/Output, MAR and Recent Results.

## 2018-05-21 NOTE — PROGRESS NOTES
Problem: Mobility Impaired (Adult and Pediatric)  Goal: *Acute Goals and Plan of Care (Insert Text)  Physical Therapy Goals  Goals reassessed 5/11/2018 and upgraded to below:--Goals reassessed 5/17/18 and remain appropriate to continue  1. Patient will move from supine to sit and sit to supine  in bed with minimal assistance/contact guard assist NOT using chair position for bed within 7 day(s). 2.  Patient will transfer from bed to chair and chair to bed with supervision/set-up using the least restrictive device within 7 day(s). 3.  Patient will perform sit to stand with distant supervision/set-up within 7 day(s). 4.  Patient will ambulate with supervision/set-up for 300 feet with the least restrictive device within 7 day(s). Goals reassessed 5/4/18, updated and remain appropriate over next 7 days  Continue progress towards current goals 4/26/18  Update 4/18/2018  1. Patient will move from supine to sit and sit to supine  in bed with minimal assistance  within 7 day(s). MET on 5/4/18-Updated goal:  Patient will move from supine to sit and sit to supine  in bed with supervision within 7 day(s). 2.  Patient will transfer from bed to chair and chair to bed with minimal assistance  using the least restrictive device within 7 day(s). MET on 5/4/18-Updated goal:  Patient will transfer from bed to chair and chair to bed with supervision using the least restrictive device within 7 day(s). 3.  Patient will perform sit to stand with SBA within 7 days. MET  4. Patient will ambulate 150' with SBA x 1 using LRAD within 7 days. MET    Update 4/6/2018  1. Patient will move from supine to sit and sit to supine  in bed with moderate assistance  within 7 day(s). 2.  Patient will transfer from bed to chair and chair to bed with moderate assistance  using the least restrictive device within 7 day(s). 3.  Patient will perform sit to stand with CGA within 7 days  4.   Patient will ambulate 22' with moderate assist x 1 using LRAD within 7 days. Revised 3/27/2018  1. Patient will move from supine to sit and sit to supine  in bed with moderate assistance  within 7 day(s). 2.  Patient will transfer from bed to chair and chair to bed with moderate assistance  using the least restrictive device within 7 day(s). 3.  Patient will perform sit to stand with moderate assistance  within 7 day(s). 4.  Patient will ambulate 100' with moderate assist x1 using the least restrictive device within 7 day(s). Revisited 3/19/2018, goals remain appropriate, carry over    Physical Therapy Goals  Initiated 3/8/2018  1. Patient will move from supine to sit and sit to supine  in bed with moderate assistance  within 7 day(s). 2.  Patient will transfer from bed to chair and chair to bed with moderate assistance  using the least restrictive device within 7 day(s). 3.  Patient will perform sit to stand with moderate assistance  within 7 day(s). 4.  PT will assess gait with the least restrictive device within 7 day(s). physical Therapy TREATMENT  Patient: Faye Calvillo (38 y.o. female)  Date: 5/21/2018  Diagnosis: FISTULA  Small bowel fistula  INTEROCUTANOUS FISTULA <principal problem not specified>  Procedure(s) (LRB):  OPEN JEJUNOSTOMY TUBE PLACEMENT UNDER FLURO/ WOUND VAC CHANGE. (N/A) 39 Days Post-Op  Precautions: Fall, Skin, Contact  Chart, physical therapy assessment, plan of care and goals were reviewed. ASSESSMENT:  Pt was able to ambulate with rolling walker at a supervision level. Pt does have increase fluid in LE and feet. Pt reports burning pain in feet with gait that loosened up with distance. discussed with pt to stand and weightbear through LE more and continue ankle pumps. Educated pt on having LE above the heart to assist with edema. Pt is planning on discharging home. Pt deferred continued stair training at this time. Pt could benefit from HHPT to progress at discharge.    Progression toward goals:  [x]    Improving appropriately and progressing toward goals  []    Improving slowly and progressing toward goals  []    Not making progress toward goals and plan of care will be adjusted     PLAN:  Patient continues to benefit from skilled intervention to address the above impairments. Continue treatment per established plan of care. Discharge Recommendations:  Home Health  Further Equipment Recommendations for Discharge:  None owns rolling walker     SUBJECTIVE:   Patient stated I am swollen again.     OBJECTIVE DATA SUMMARY:   Critical Behavior:  Neurologic State: Alert  Orientation Level: Oriented X4  Cognition: Appropriate safety awareness, Appropriate decision making, Appropriate for age attention/concentration, Follows commands  Safety/Judgement: Awareness of environment  Functional Mobility Training:  Bed Mobility:                    Transfers:  Sit to Stand: Supervision  Stand to Sit: Supervision                             Balance:  Sitting: Intact  Standing: Impaired  Standing - Static: Good  Standing - Dynamic : Fair  Ambulation/Gait Training:  Distance (ft): 80 Feet (ft)  Assistive Device: Walker, rolling  Ambulation - Level of Assistance: Stand-by assistance        Gait Abnormalities: Decreased step clearance        Base of Support: Widened     Speed/Liz: Pace decreased (<100 feet/min)  Step Length: Left shortened;Right shortened                   Stairs:              Neuro Re-Education:    Therapeutic Exercises:     Pain:  Pain Scale 1: Numeric (0 - 10)  Pain Intensity 1: 7  Pain Location 1: Abdomen  Pain Orientation 1: Anterior  Pain Description 1: Aching  Pain Intervention(s) 1: Medication (see MAR)  Activity Tolerance:   Limited   Please refer to the flowsheet for vital signs taken during this treatment.   After treatment:   [x]    Patient left in no apparent distress sitting up in chair  []    Patient left in no apparent distress in bed  [x]    Call bell left within reach  [x] Nursing notified  []    Caregiver present  []    Bed alarm activated    COMMUNICATION/COLLABORATION:   The patients plan of care was discussed with: Registered Nurse    Darian Nails PTA   Time Calculation: 24 mins

## 2018-05-21 NOTE — PROGRESS NOTES
Reviewed medical chart; met with the patient at the bedside. Patient may discharge tomorrow on TPN with home health services. This  will still need a home health order for \"skilled nursing for wound care per WOCN note, TPN education, and PT. \"  Referrals have already been sent to [facility] and [facility], as the patient has previously utilized both agencies; awaiting responses. Due to the patient's lengthy hospitalization, this  called to make a follow up appointment with her PCP office, Dr. Ellen Almaguer. This  learned that this practice only allows patients to call to schedule appointments and no outside entities. Care Management will continue to follow her disposition.    ROBERT Yun

## 2018-05-21 NOTE — PROGRESS NOTES
Bedside and Verbal shift change report given to Ramses Darling RN (oncoming nurse) by Colleen Gonzales RN (offgoing nurse). Report included the following information SBAR, Kardex, Intake/Output and MAR.

## 2018-05-21 NOTE — WOUND CARE
WOCN Note:     Follow-up visit for fistula pouch change.      Chart shows:  Admitted for fistula takdown 3/7/18 by Dr. Shanice Painter with [facility] placement in [place]; history of multiple abdominal surgeries and Crohns disease. Admitted from home. Mother able to provide assistance in pouching prior to admission and became very competent in doing so.       Assessment:   Patient is A&O x 4, continent and mobile - moves around room with assistance & has been working with PT. Mercedes Sherwood  Bed: total care bariatric  Uses female urinal.       1. Midline abdominal surgical wound = 12 x 11 x 1.5 cm  60% pink granular wound base and 40% stomatized mucus membrane centrally with  peristalsis and output @ 4 o'clock. Pouched using pouch she provided from home - XL Hawk's wound manager with 10 Hawk's rings and paste to achieve seal.       Transition of Care: Mitchell Lucas and her mother have demonstrated proficeincy in managing the pouching. She anticipates discharge home tomorrow.     SNOY Starkey, RN, [employer]  Certified Wound, Ostomy, Continence Nurse  office [phone]  pager [phone] or call  to page

## 2018-05-21 NOTE — PROGRESS NOTES
Problem: Self Care Deficits Care Plan (Adult)  Goal: *Acute Goals and Plan of Care (Insert Text)  Occupational Therapy Goals:  Weekly reassessment: 5/21/18: All goals continued  Weekly re-assessment- 5/11/18  1. Patient will complete bathing upper body to knees with setup within 7 days. CONT  2. Patient will complete bariatric BSC transfer with supervision bariatric RW within 7 days. CONT   3. Patient will complete lower body dressing activities with supervision within 7 days using AE. CONT  4. Patient will perform standing for clothing management tasks (toileting or dressing) supervision without AD support within 7 days. GOAL MET- upgraded  5. Patient will complete light resistance band UE exercise program in standing with supervision within 7 days. GOAL MET- upgraded    Goals reviewed and continued: 5/4/18  Revised 4/27/18  1. Patient will complete bathing upper body to knees with setup within 7 days upgraded 4/27  2. Patient will complete bariatric BSC transfer with supervision bariatric RW within 7 days. Upgraded 4/27  3. Patient will complete lower body dressing activities with supervision within 7 days using AE  4. Patient will perform standing for clothing management tasks (toileting or dressing) supervision bariatric RW within 7 days. Upgraded 4/27  5. Patient will complete light resistance band UE exercise program independently within 7 days. Upgraded 4/27/18    Reevaluated 4/20/2018  1. Patient will complete grooming activity sitting EOB without support for 10 minutes within 7 days  2. Patient will complete BSC transfer with min A x 1 persons within 7 days. 3. Patient will complete lower body dressing activities with supervision within 7 days using AE  4. Patient will perform standing for clothing management tasks (toileting or dressing) with min A x 1 persons within 7 days. 5. Patient will complete UE exercise program independently within 7 days. Reevaluated 4/10/2018  1.   Patient will complete grooming activity sitting EOB without support for 10 minutes within 7 days. 2.  Patient will complete BSC transfer with min A x 2 persons within 7 days. 3.  Patient will complete lower body dressing activities with min A within 7 days. 4.  Patient will perform standing for clothing management tasks (toileting or dressing) with min A x 2 persons within 7 days. 5.  Patient will complete UE exercise program independently within 7 days. Initiated 4/4/2018  1. Patient will perform rolling and pulling self up to head of bed with min assist to assist with ADL's at bed level within 7 day(s). 2.  Patient will perform static sitting edge of bed > or = 10 minutes with supervision/set-up within 7 day(s). 3.  Patient will perform static standing with bilateral UE support on rolling walker > or = 3 minutes with minimal assistance/contact guard assist x's 2 within 7 day(s). 4.  Patient will perform toilet transfers with moderate assistance x's 2 to bedside commode within 7 days. 5.  Patient will perform bilateral UE AROM and strengthening exercises throughout day and grade exercises prn to increase demand within 7 day(s). Occupational Therapy TREATMENT: WEEKLY REASSESSMENT  Patient: Genna Salas (38 y.o. female)  Date: 5/21/2018  Diagnosis: FISTULA  Small bowel fistula  INTEROCUTANOUS FISTULA <principal problem not specified>  Procedure(s) (LRB):  OPEN JEJUNOSTOMY TUBE PLACEMENT UNDER FLURO/ WOUND VAC CHANGE. (N/A) 39 Days Post-Op  Precautions: Fall, Skin, Contact  Chart, occupational therapy assessment, plan of care, and goals were reviewed. ASSESSMENT:  Patient received post PT and sitting in chair. She verbalized feeling comfortable with recent plan for discharge to home with mother. States (and is demonstrated in chart) went over all functional activities with OT at last visit and has now purchased a hip kit (including a wide sock aide).  States she is comfortable with use and states she feels she is ready. Patient has a theraband home exercise program and is able to demonstrate use. ADLs not addressed, but patient states she and mother are comfortable with care and have all equipment needs at this time. Continue present goals. Wound care arrived and session ended. Note plan for HHOT and family assist.   Progression toward goals:  [x]            Improving appropriately and progressing toward goals  []            Improving slowly and progressing toward goals  []            Not making progress toward goals and plan of care will be adjusted     PLAN:  Goals have been updated based on progression since last assessment. Patient continues to benefit from skilled intervention to address the above impairments. Continue to follow patient 3 times a week to address goals. Planned Interventions:  [x]                    Self Care Training                  [x]             Therapeutic Activities  [x]                    Functional Mobility Training    []             Cognitive Retraining  [x]                    Therapeutic Exercises           [x]             Endurance Activities  []                    Balance Training                   []             Neuromuscular Re-Education  []                    Visual/Perceptual Training     [x]        Home Safety Training  [x]                    Patient Education                 []             Family Training/Education  []                    Other (comment):  Discharge Recommendations: Home Health  Further Equipment Recommendations for Discharge: States she purchased a hip kit with a wide sock aide     SUBJECTIVE:   Patient stated I bought a hip kit.     OBJECTIVE DATA SUMMARY:   Cognitive/Behavioral Status:  Neurologic State: Alert  Orientation Level: Oriented X4  Cognition: Appropriate safety awareness; Appropriate decision making; Appropriate for age attention/concentration; Follows commands  Perception: Appears intact  Perseveration: No perseveration noted  Safety/Judgement: Awareness of environment    Functional Mobility and Transfers for ADLs:         Cognitive Retraining  Safety/Judgement: Awareness of environment    Neuro Re-Education:           Therapeutic Exercises:   Demonstrates Mod I with theraband HEP  Activity Tolerance:   Fair  Please refer to the flowsheet for vital signs taken during this treatment. After treatment:   [x] Patient left in no apparent distress sitting up in chair  [] Patient left in no apparent distress in bed  [x] Call bell left within reach  [x] Nursing notified  [] Caregiver present  [] Bed alarm activated    COMMUNICATION/COLLABORATION:   The patients plan of care was discussed with: Occupational Therapist and Registered Nurse    GERMAN Dorsey  Time Calculation: 16 mins      Chart reviewed, agree with SY findings in the assessment, plan of care, and goal review.    Beverly Hernández, OT

## 2018-05-21 NOTE — ADT AUTH CERT NOTES
Patient Demographics        Patient Name [address] Sex  Address Phone       Bibi Weathers [phone] Female 1975 [address] [place]  (Home)  [phone] (Work)  [phone] (Mobile) *Preferred*           CSN:       [id number]           Admit Date: Admit Time Room Bed       Mar 7, 2018  8:17  [36158] 01 [[id number]]           Attending Providers        Provider Pager From To       Jo Ann Edwards MD  18            Emergency Contact(s)        Name Relation Home Work Mobile     Courtney Garduno [phone]       Farrah Hardy [phone]           Utilization Review           General Surgery or Procedure [facility] - Care Day 76 (2018) by Michael Cruz RN        Review Status Review Entered       Completed 2018       Details              Care Day: 70 Care Date: 2018 Level of Care: Inpatient Floor       Guideline Day 3        Level Of Care       ( ) * Activity level acceptable              Clinical Status       ( ) * Operative site and other wounds acceptable       ( ) * Pain and nausea absent or adequately managed       (X) * Temperature status acceptable       2018 11:08 AM EDT by Bhavesh Harper         98.6 95 112/73 24 100% ra              ( ) * No infection, or status acceptable       ( ) * No blood loss, or problem resolved       ( ) * Abdominal status acceptable       ( ) * Hepatic and biliary abnormalities absent or acceptable       ( ) * Inflammation absent or stable (eg, colitis)       ( ) * No transplant done, or status acceptable       ( ) * General Discharge Criteria met              Interventions       ( ) * Intake acceptable       ( ) * No inpatient interventions needed              2018 11:08 AM EDT by Bhavesh Harper       Subject: Additional Clinical Information       contact isolation gi lite diet am labs wbc 10.3 H&H 7.4 24.5 alb 1.8 gluc 187pt reports abdominal discomfort 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 as tolearted renew cyclic tpn and lipids pt ot following am labs gluc monitoring lovenox 40 mg sq q24h 325 mg asa daily marinol 2.5 mg 2 x daily iv lipids 3 x a week mon wed frilidoderm patch 2 q24h morphine cr 100 mg q8h morphine ir 30 mg q3h iv zofran 4 mg q4h prn given x1 iv compazine 5 mg q6h prn given x1 tpn cycle over 14 hrs                                   * Milestone                  General Surgery or Procedure [facility] - Care Day 75 (5/20/2018) by Lindajo Olszewski, RN        Review Status Review Entered       Completed 5/21/2018       Details              Care Day: 76 Care Date: 5/20/2018 Level of Care: Inpatient Floor       Guideline Day 3        Level Of Care       ( ) * Activity level acceptable              Clinical Status       ( ) * Operative site and other wounds acceptable       ( ) * Pain and nausea absent or adequately managed       (X) * Temperature status acceptable       5/21/2018 10:55 AM EDT by Gloria Shaw         afebrile  131/78 98 98.7 16 98% ra              ( ) * No infection, or status acceptable       ( ) * No blood loss, or problem resolved       ( ) * Abdominal status acceptable       ( ) * Hepatic and biliary abnormalities absent or acceptable       ( ) * Inflammation absent or stable (eg, colitis)       ( ) * No transplant done, or status acceptable       ( ) * General Discharge Criteria met              Interventions       ( ) * Intake acceptable       ( ) * No inpatient interventions needed              5/21/2018 10:59 AM EDT by Gloria Shaw       Subject: Additional Clinical Information       gluc 191, 162, 153              5/21/2018 10:55 AM EDT by Gloria Shaw       Subject: Additional Clinical Information       38 days postop open jejunostomy tube placement wound vac 142 kg 325 mg daily marinol 2.5 mg 2x daily lovenox 40 mg sq q24h lidoderm patch 2 patch q24h iv ativan 1 mg q6h prn given x1 morphine cr 100 mg q8h morphine ir 30 mg q3h tpn cycle over 14 hrs daily pain controlled tolerating diet   wound appliance intact but   leaked last night fistula with feculent output abd S/ND/appropriate TTP loval wound care possible dc home with cyclic TPN on tuesday am labs education on dressing and bag change                                    * Milestone

## 2018-05-21 NOTE — PROGRESS NOTES
Ms. Boris Ramirez reports abdominal discomfort this AM.  Tm 98.7 HR: 95 BP: 112/73 Resp Rate: 24 100% sat on room air. Intake/Output Summary (Last 24 hours) at 05/21/18 0826  Last data filed at 05/21/18 0616   Gross per 24 hour   Intake              600 ml   Output             6400 ml   Net            -5800 ml   Exam: Cor: RRR. Lungs: Bilateral breath sounds. Clear to auscultation. Abd: Soft. Non tender. 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 Labs:   Recent Results (from the past 12 hour(s))   GLUCOSE, POC    Collection Time: 05/20/18 10:03 PM   Result Value Ref Range    Glucose (POC) 153 (H) 65 - 100 mg/dL    Performed by Laila Hayes    CBC W/O DIFF    Collection Time: 05/21/18  4:44 AM   Result Value Ref Range    WBC 10.3 3.6 - 11.0 K/uL    RBC 2.64 (L) 3.80 - 5.20 M/uL    HGB 7.4 (L) 11.5 - 16.0 g/dL    HCT 24.5 (L) 35.0 - 47.0 %    MCV 92.8 80.0 - 99.0 FL    MCH 28.0 26.0 - 34.0 PG    MCHC 30.2 30.0 - 36.5 g/dL    RDW 17.1 (H) 11.5 - 14.5 %    PLATELET 178 553 - 981 K/uL    MPV 9.4 8.9 - 12.9 FL    NRBC 0.2 (H) 0  WBC    ABSOLUTE NRBC 0.02 (H) 0.00 - 5.49 K/uL   METABOLIC PANEL, COMPREHENSIVE    Collection Time: 05/21/18  4:44 AM   Result Value Ref Range    Sodium 138 136 - 145 mmol/L    Potassium 3.6 3.5 - 5.1 mmol/L    Chloride 112 (H) 97 - 108 mmol/L    CO2 16 (L) 21 - 32 mmol/L    Anion gap 10 5 - 15 mmol/L    Glucose 172 (H) 65 - 100 mg/dL    BUN 32 (H) 6 - 20 MG/DL    Creatinine 0.76 0.55 - 1.02 MG/DL    BUN/Creatinine ratio 42 (H) 12 - 20      GFR est AA >60 >60 ml/min/1.73m2    GFR est non-AA >60 >60 ml/min/1.73m2    Calcium 9.3 8.5 - 10.1 MG/DL    Bilirubin, total 0.3 0.2 - 1.0 MG/DL    ALT (SGPT) 40 12 - 78 U/L    AST (SGOT) 27 15 - 37 U/L    Alk.  phosphatase 176 (H) 45 - 117 U/L    Protein, total 6.8 6.4 - 8.2 g/dL    Albumin 1.8 (L) 3.5 - 5.0 g/dL    Globulin 5.0 (H) 2.0 - 4.0 g/dL    A-G Ratio 0.4 (L) 1.1 - 2.2     GLUCOSE, POC    Collection Time: 05/21/18  7:06 AM   Result Value Ref Range    Glucose (POC) 187 (H) 65 - 100 mg/dL    Performed by OWEN TREVINO    Continue diet and supplements as tolerated. Renew cyclic TPN and lipids. PT and OT following. Pain management per Palliative Care. DVT Prophylaxis - Lovenox. Discharge planning.

## 2018-05-21 NOTE — PROGRESS NOTES
Bedside and Verbal shift change report given to Tonie Valdes (oncoming nurse) by Syed Daniel RN (offgoing nurse). Report included the following information SBAR, Kardex, Intake/Output and MAR.

## 2018-05-22 ENCOUNTER — HOME HEALTH ADMISSION (OUTPATIENT)
Dept: HOME HEALTH SERVICES | Facility: HOME HEALTH | Age: 43
End: 2018-05-22
Payer: MEDICARE

## 2018-05-22 LAB
GLUCOSE BLD STRIP.AUTO-MCNC: 200 MG/DL (ref 65–100)
SERVICE CMNT-IMP: ABNORMAL

## 2018-05-22 PROCEDURE — 74011250636 HC RX REV CODE- 250/636: Performed by: SURGERY

## 2018-05-22 PROCEDURE — 74011250637 HC RX REV CODE- 250/637: Performed by: PHYSICIAN ASSISTANT

## 2018-05-22 PROCEDURE — 65270000029 HC RM PRIVATE

## 2018-05-22 PROCEDURE — 74011250637 HC RX REV CODE- 250/637: Performed by: PHYSICAL MEDICINE & REHABILITATION

## 2018-05-22 PROCEDURE — 82962 GLUCOSE BLOOD TEST: CPT

## 2018-05-22 PROCEDURE — 74011250637 HC RX REV CODE- 250/637: Performed by: NURSE PRACTITIONER

## 2018-05-22 PROCEDURE — 77030010520

## 2018-05-22 PROCEDURE — 74011250637 HC RX REV CODE- 250/637: Performed by: SURGERY

## 2018-05-22 PROCEDURE — 74011636637 HC RX REV CODE- 636/637: Performed by: SURGERY

## 2018-05-22 PROCEDURE — 97530 THERAPEUTIC ACTIVITIES: CPT

## 2018-05-22 PROCEDURE — 74011000258 HC RX REV CODE- 258: Performed by: SURGERY

## 2018-05-22 PROCEDURE — 74011250636 HC RX REV CODE- 250/636: Performed by: NURSE PRACTITIONER

## 2018-05-22 PROCEDURE — 97116 GAIT TRAINING THERAPY: CPT

## 2018-05-22 PROCEDURE — 74011000250 HC RX REV CODE- 250: Performed by: SURGERY

## 2018-05-22 RX ADMIN — MORPHINE SULFATE 30 MG: 15 TABLET ORAL at 10:37

## 2018-05-22 RX ADMIN — Medication 10 ML: at 21:35

## 2018-05-22 RX ADMIN — ASPIRIN 325 MG: 325 TABLET ORAL at 09:27

## 2018-05-22 RX ADMIN — Medication 10 ML: at 12:21

## 2018-05-22 RX ADMIN — MORPHINE SULFATE 100 MG: 100 TABLET, FILM COATED, EXTENDED RELEASE ORAL at 13:57

## 2018-05-22 RX ADMIN — MORPHINE SULFATE 30 MG: 15 TABLET ORAL at 07:39

## 2018-05-22 RX ADMIN — ENOXAPARIN SODIUM 40 MG: 100 INJECTION SUBCUTANEOUS at 09:27

## 2018-05-22 RX ADMIN — DRONABINOL 2.5 MG: 2.5 CAPSULE ORAL at 18:09

## 2018-05-22 RX ADMIN — MORPHINE SULFATE 30 MG: 15 TABLET ORAL at 01:39

## 2018-05-22 RX ADMIN — ASCORBIC ACID: 500 INJECTION, SOLUTION INTRAMUSCULAR; INTRAVENOUS; SUBCUTANEOUS at 19:44

## 2018-05-22 RX ADMIN — PROCHLORPERAZINE EDISYLATE 5 MG: 5 INJECTION INTRAMUSCULAR; INTRAVENOUS at 18:09

## 2018-05-22 RX ADMIN — MORPHINE SULFATE 30 MG: 15 TABLET ORAL at 16:20

## 2018-05-22 RX ADMIN — MORPHINE SULFATE 30 MG: 15 TABLET ORAL at 13:24

## 2018-05-22 RX ADMIN — MORPHINE SULFATE 100 MG: 100 TABLET, FILM COATED, EXTENDED RELEASE ORAL at 21:35

## 2018-05-22 RX ADMIN — Medication 10 ML: at 06:08

## 2018-05-22 RX ADMIN — PROCHLORPERAZINE EDISYLATE 5 MG: 5 INJECTION INTRAMUSCULAR; INTRAVENOUS at 12:21

## 2018-05-22 RX ADMIN — MORPHINE SULFATE 30 MG: 15 TABLET ORAL at 04:26

## 2018-05-22 RX ADMIN — ONDANSETRON HYDROCHLORIDE 4 MG: 2 INJECTION INTRAMUSCULAR; INTRAVENOUS at 06:29

## 2018-05-22 RX ADMIN — LORAZEPAM 1 MG: 2 INJECTION INTRAMUSCULAR; INTRAVENOUS at 23:05

## 2018-05-22 RX ADMIN — MORPHINE SULFATE 100 MG: 100 TABLET, FILM COATED, EXTENDED RELEASE ORAL at 06:08

## 2018-05-22 RX ADMIN — ASCORBIC ACID: 500 INJECTION, SOLUTION INTRAMUSCULAR; INTRAVENOUS; SUBCUTANEOUS at 18:39

## 2018-05-22 RX ADMIN — MORPHINE SULFATE 30 MG: 15 TABLET ORAL at 21:35

## 2018-05-22 RX ADMIN — ACETAMINOPHEN 650 MG: 325 TABLET, FILM COATED ORAL at 22:58

## 2018-05-22 RX ADMIN — Medication 10 ML: at 13:25

## 2018-05-22 RX ADMIN — DRONABINOL 2.5 MG: 2.5 CAPSULE ORAL at 09:27

## 2018-05-22 RX ADMIN — LORAZEPAM 1 MG: 2 INJECTION INTRAMUSCULAR; INTRAVENOUS at 15:04

## 2018-05-22 RX ADMIN — MORPHINE SULFATE 30 MG: 15 TABLET ORAL at 19:42

## 2018-05-22 RX ADMIN — Medication 10 ML: at 06:09

## 2018-05-22 NOTE — PROGRESS NOTES
Spoke with [facility] liaison, Gee Watt (I#[phone]). Additional documentation from the MD and dietitian was provided to HCP today, but they are still seeking insurance approval from [facility] for the patient's TPN. Patient's bariatric ambulance was originally arranged for 3:30PM today. Called [facility] P#[phone] and spoke with [facility]; trip was placed on \"WILL CALL\" for tomorrow. HCP liaison updated the patient at the bedside. Care Management will continue to follow her disposition.    ROBERT Cameron

## 2018-05-22 NOTE — PROGRESS NOTES
NUTRITION COMPLETE ASSESSMENT    RECOMMENDATIONS:   1. Continue TPN at current goal as primary source of nutrition  -- Continue Ascorbic acid and Zinc Sulfate additives secondary to high output    2. Continue daily weights-- standing scale preferred    3. Recheck phosphorus and replete prn     Interventions/Plan:   Food/Nutrient Delivery: TPN as primary source of nutrition    Assessment:   Reason for Assessment:   [x]Reassessment     Diet:  GI Lite, Vital 1.5 BID  Nutritionally Significant Medications: [x] Reviewed & Includes: zofran q 4 hours prn; marinol 2.5 mg BID; compazine q 6 hours prn; scopolamine q72 hours    TPN: 6%AA D20 @ 90 mL/hr x 1 hours    @ 155 mL/hr x 12 hours    @ 90 mL/hr x last hour   + MVI, 14 units insulin/L, famotidine 40 mg, thiamine (100 mg), ascorbic acid 500 mg   + 20% lipids, 250 mL 3x/week    Meal Intake: Patient Vitals for the past 100 hrs:   % Diet Eaten   05/20/18 0900 100 %   05/19/18 1218 100 %   05/18/18 2304 100 %     Subjective:  Pt in good spirits. Hopeful for discharge, but aware that TPN still needs to be set up. Mom at the bedside. She is looking forward to going home to her own house. Objective:  Chart reviewed, discussed with RN and team during interdisciplinary rounds. Pt remains on TPN as sole source of nutrition. Spoke with [facility] RD regarding the need for TPN at home. Also spoke with surgeon. Pt will continue to require long term TPN secondary to malabsorption and short bowel (see MD note from today). The pt is aware that TPN will be ongoing for nutrition support as she was unable to be successfully fed enterally via J-tube (surgical or bedside placement) and her output is too high for adequate absorption of po intake. TPN has been providing a daily average of 2091 kcal, 122 g protein in 2040 mL (2290 mL on lipid days)-- meeting 100% of estimated needs.     Current weight is from [facility], so not accurate-- did not recalculate estimated needs. Last standing weight from 5/16 was 139.48 kg (307#). Sodium in TPN decreased to 2570 mg/day    Drain Output x 3 days:  2700 mL  3450 mL  4500 mL    Estimated Nutrition Needs:   Kcals/day: 2041 Kcals/day (7197-3763 kcal/day (MSJ x 1-1.1))  Protein: 111 g (111-152 g/day (0.8-1.1 kg))  Fluid: 2250 ml (or 1 mL/kcal or per output)     Based On: [place] Jeor  Weight Used: Actual wt (138.6 kg (standing on 4/27))    Pt expected to meet estimated nutrient needs:  [x]   Yes (via TPN only)    Nutrition Diagnosis:   1. 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  Less than optimal enteral nutrition related to slow tube feeding progression as evidenced by resolved (no longer on tube feedings)    Goals:     Pt to meet at least 90% of estimated needs via TPN over the next 5-7 days     Monitoring & Evaluation:    - Liquid meal replacement, Total energy intake   - Weight/weight change     Previous Nutrition Goals Met:  Yes  Previous Recommendations:      Yes    Education & Discharge Needs:   [x] None Identified   [] Identified and addressed    [x] Participated in care plan, discharge planning, and/or interdisciplinary rounds        Cultural, Cheondoism and ethnic food preferences identified:  NONE      Skin Integrity: []Intact  [x]Other  Edema: []None [x]Other: 1-2+  Last BM: 5/20/18  Food Allergies: [x]None []Other    Anthropometrics:    Weight Loss Metrics 5/22/2018 3/7/2018 3/5/2018 2/28/2018 2/27/2018 2/27/2018 2/26/2018   Today's Wt 313 lb 11.4 oz - 304 lb 3.2 oz 306 lb 306 lb 8 oz - 307 lb   BMI - 53.85 kg/m2 52.22 kg/m2 52.52 kg/m2 52.61 kg/m2 - 52.7 kg/m2      Last 3 Recorded Weights in this Encounter    05/20/18 1426 05/21/18 1608 05/22/18 0621   Weight: 142 kg (313 lb 0.9 oz) 133.9 kg (295 lb 4.8 oz) 142.3 kg (313 lb 11.4 oz)      Weight Source: Bed  Height: 5' 4\" (162.6 cm),    Body mass index is 53.85 kg/(m^2). IBW : 54.4 kg (120 lb),    Usual Body Weight: 135.2 kg (298 lb) (1/2018),      Labs:  Lab Results   Component Value Date/Time    Sodium 138 05/21/2018 04:44 AM    Potassium 3.6 05/21/2018 04:44 AM    Chloride 112 (H) 05/21/2018 04:44 AM    CO2 16 (L) 05/21/2018 04:44 AM    Glucose 172 (H) 05/21/2018 04:44 AM    BUN 32 (H) 05/21/2018 04:44 AM    Creatinine 0.76 05/21/2018 04:44 AM    Calcium 9.3 05/21/2018 04:44 AM    Magnesium 1.7 05/17/2018 05:29 AM    Phosphorus 1.9 (L) 05/17/2018 05:29 AM    Albumin 1.8 (L) 05/21/2018 04:44 AM     No results found for: HBA1C, HGBE8, SNL2LGJK, AOD2PSYV  Lab Results   Component Value Date/Time    Glucose 172 (H) 05/21/2018 04:44 AM    Glucose (POC) 200 (H) 05/22/2018 06:22 AM      Lab Results   Component Value Date/Time    ALT (SGPT) 40 05/21/2018 04:44 AM    AST (SGOT) 27 05/21/2018 04:44 AM    Alk.  phosphatase 176 (H) 05/21/2018 04:44 AM    Bilirubin, direct 0.1 07/07/2009 06:38 PM    Bilirubin, total 0.3 05/21/2018 04:44 AM      [facility] [address]

## 2018-05-22 NOTE — PROGRESS NOTES
Problem: Mobility Impaired (Adult and Pediatric)  Goal: *Acute Goals and Plan of Care (Insert Text)  Physical Therapy Goals  Goals reassessed 5/11/2018 and upgraded to below:--Goals reassessed 5/17/18 and remain appropriate to continue  1. Patient will move from supine to sit and sit to supine  in bed with minimal assistance/contact guard assist NOT using chair position for bed within 7 day(s). 2.  Patient will transfer from bed to chair and chair to bed with supervision/set-up using the least restrictive device within 7 day(s). 3.  Patient will perform sit to stand with distant supervision/set-up within 7 day(s). 4.  Patient will ambulate with supervision/set-up for 300 feet with the least restrictive device within 7 day(s). Goals reassessed 5/4/18, updated and remain appropriate over next 7 days  Continue progress towards current goals 4/26/18  Update 4/18/2018  1. Patient will move from supine to sit and sit to supine  in bed with minimal assistance  within 7 day(s). MET on 5/4/18-Updated goal:  Patient will move from supine to sit and sit to supine  in bed with supervision within 7 day(s). 2.  Patient will transfer from bed to chair and chair to bed with minimal assistance  using the least restrictive device within 7 day(s). MET on 5/4/18-Updated goal:  Patient will transfer from bed to chair and chair to bed with supervision using the least restrictive device within 7 day(s). 3.  Patient will perform sit to stand with SBA within 7 days. MET  4. Patient will ambulate 150' with SBA x 1 using LRAD within 7 days. MET    Update 4/6/2018  1. Patient will move from supine to sit and sit to supine  in bed with moderate assistance  within 7 day(s). 2.  Patient will transfer from bed to chair and chair to bed with moderate assistance  using the least restrictive device within 7 day(s). 3.  Patient will perform sit to stand with CGA within 7 days  4.   Patient will ambulate 22' with moderate assist x 1 using LRAD within 7 days. Revised 3/27/2018  1. Patient will move from supine to sit and sit to supine  in bed with moderate assistance  within 7 day(s). 2.  Patient will transfer from bed to chair and chair to bed with moderate assistance  using the least restrictive device within 7 day(s). 3.  Patient will perform sit to stand with moderate assistance  within 7 day(s). 4.  Patient will ambulate 100' with moderate assist x1 using the least restrictive device within 7 day(s). Revisited 3/19/2018, goals remain appropriate, carry over    Physical Therapy Goals  Initiated 3/8/2018  1. Patient will move from supine to sit and sit to supine  in bed with moderate assistance  within 7 day(s). 2.  Patient will transfer from bed to chair and chair to bed with moderate assistance  using the least restrictive device within 7 day(s). 3.  Patient will perform sit to stand with moderate assistance  within 7 day(s). 4.  PT will assess gait with the least restrictive device within 7 day(s). physical Therapy TREATMENT  Patient: Zuleyma Xie (38 y.o. female)  Date: 5/22/2018  Diagnosis: FISTULA  Small bowel fistula  INTEROCUTANOUS FISTULA <principal problem not specified>  Procedure(s) (LRB):  OPEN JEJUNOSTOMY TUBE PLACEMENT UNDER FLURO/ WOUND VAC CHANGE. (N/A) 40 Days Post-Op  Precautions: Fall, Skin, Contact  Chart, physical therapy assessment, plan of care and goals were reviewed. ASSESSMENT:  Pt is agreeable to ambulate. Pt reporting Swelling in her feet causing burning pain. Pt was able to weight bear through LE and ambulate with rolling walker. Pt reports decrease pain in the feet with gait. Pt is hopeful to discharge today.  Pt could benefit from HHPT to progress mobility    Progression toward goals:  [x]    Improving appropriately and progressing toward goals  []    Improving slowly and progressing toward goals  []    Not making progress toward goals and plan of care will be adjusted     PLAN:  Patient continues to benefit from skilled intervention to address the above impairments. Continue treatment per established plan of care. Discharge Recommendations:  Home Health  Further Equipment Recommendations for Discharge:  none     SUBJECTIVE:   Patient stated I am going to miss everyone.     OBJECTIVE DATA SUMMARY:   Critical Behavior:  Neurologic State: Alert  Orientation Level: Oriented X4  Cognition: Follows commands  Safety/Judgement: Awareness of environment  Functional Mobility Training:  Bed Mobility:                    Transfers:  Sit to Stand: Supervision  Stand to Sit: Supervision                             Balance:  Sitting: Intact  Standing: Impaired  Standing - Static: Good  Standing - Dynamic : Fair  Ambulation/Gait Training:  Distance (ft): 140 Feet (ft)  Assistive Device: Walker, rolling  Ambulation - Level of Assistance: Supervision        Gait Abnormalities: Decreased step clearance        Base of Support: Widened     Speed/Liz: Pace decreased (<100 feet/min); Slow  Step Length: Left shortened;Right shortened                    Stairs:              Neuro Re-Education:    Therapeutic Exercises:     Pain:                    Activity Tolerance:   Limited   Please refer to the flowsheet for vital signs taken during this treatment.   After treatment:   []    Patient left in no apparent distress sitting up in chair  [x]    Patient left in no apparent distress in bed  [x]    Call bell left within reach  [x]    Nursing notified  []    Caregiver present  []    Bed alarm activated    COMMUNICATION/COLLABORATION:   The patients plan of care was discussed with: Registered Nurse    Ajit Godoy PTA   Time Calculation: 27 mins

## 2018-05-22 NOTE — PROGRESS NOTES
Palliative Medicine Consult  Heath: [phone] ([phone])    Patient Name: Rd Lemus  YOB: 1975    Date of Initial Consult: 3/12/18  Reason for Consult: Pain management, chronic and post op   Requesting Provider: Rajni   Primary Care Physician: Roman Noriega MD     SUMMARY:   dR Lemus is a 38 y.o. with a past history of Crohn's disease (followed by Dr Yady Renner), mult surgeries s/p total colectomy w/ end ileostomy, 6/2017 ex lap with small bowel perforation, 6/28/17 ex lap, LPA, repair or enterotomy, SMA stent on L, 7/16/2917 ex lap, KATHRINE, fistula exclusion w/ feeding tube placement and prolonged TPN who was admitted on 3/7/2018 from home for planned surgery, s/p ex lap w/ extensive KATHRINE and small bowel fistula take down, wound vac placement. 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 Had red rubber tube placed into fistula on 4/4 , then open j tube placement 4/17, tolerating slow TFs initially until it fell out and has been back on TPN. Supposed to go to [address] [place] [address] but can no longer go while on TPN. Plan is home today. Known to our team during past hospitalizations. Has had lengthy hospital stays w/ mult complications. Has required [facility] stays in past. Has chronic pain from Crohn's disease (and has not been able to tolerate home Crohn's meds recently due to acute issues- had been on steroids, stopped prior to surgery). Discussion of Remicaide in future. Current medical issues leading to Palliative Medicine involvement include: pain management. Patient's mother, Juarez Ramirezt is NOK.  reviewed, no abberrancies- one prescriber for opioids and one pharmacy. Most recently filled 3/9/18: MSContin 100mg tid and Morphine IR 30mg- 8 tabs a day. Has been tried on equivalent dose of Fentanyl patch w/out same benefit. Discussed Methadone. Have communicated w/ the provider (Dr Mera Sousa) who follows her for pain issues.       PALLIATIVE DIAGNOSES:     1. Chronic abdominal pain from Cronh's disease and hx of surgeries  2. Lumbar back pain, chronic   3. Nausea  4. Anxiety   5. Edema ,hortencia when on TPN  6. Grief/depressive sx   7. Opioid tolerance      PLAN:     Abd pain from surgical incision and wound care as well as underlying chronic pain from Crohn's which is worse when off her steroids. Has high opioid tolerance, no aberrancies on . We have done opioid rotation in the hospital for her in past. Pt also w/ chronic lower back pain that she is feeling now that she is more active. Have discussed w/ provider who follows for her abdominal pain, Dr Priti Arreaga this stay. Mom Vivek Slater states that she has one month supply of medications at home. Pain :    1. Continue prior regimen when go home- MSContin 100mg tid and Morphine IR 30mg every 3h prn pain. She has one month supply at home, does not need an Rx upon discharge but will need to see Dr Priti Arreaga at [address] [place] [address] soon. 2. As her incision cont to heal, will be able to be tapered. 3. Please write lidoderm patch for her discharge.     Communicated plan of care with: Palliative IDT     GOALS OF CARE / TREATMENT PREFERENCES:     GOALS OF CARE:  Patient/Health Care Proxy Stated Goals:  (get pain better, treatment of acute issues.)      TREATMENT PREFERENCES:   Code Status: Full Code    Advance Care Planning:  Advance Care Planning 3/15/2018   Patient's Healthcare Decision Maker is: Named in scanned ACP document   Primary Decision Maker Name Mabel Wu   Primary Decision Maker Phone Number [phone]   Primary Decision Maker Relationship to Patient Parent   Secondary Decision Maker Name Telly Nath Phone Number [phone]   Secondary Decision Maker Relationship to Patient Unknown   Confirm Advance Directive Yes, on file   Patient Would Like to Complete Advance Directive -   Does the patient have other document types -       Medical Interventions: Full interventions           Other:    As far as possible, the palliative care team has discussed with patient / health care proxy about goals of care / treatment preferences for patient. HISTORY:     Reviewed vitals, I/O's, labs, imaging, MAR, notes. HPI/SUBJECTIVE:    The patient is:   [x] Verbal and participatory  [] Non-participatory due to:     Pt doing well, pain under control, ready to go home today!     Clinical Pain Assessment (nonverbal scale for severity on nonverbal patients):   Clinical Pain Assessment  Severity: 7  Location: Generalized abdomen/ axial lower back w/out radiation into legs  Character: Sharp/ aching   Duration: Pain improved since surgery/ chronic   Effect: Makes ambulation more difficult   Factors: Better w/ medication/ better w/ standing up straight  Frequency: Constant          Duration: for how long has pt been experiencing pain (e.g., 2 days, 1 month, years)  Frequency: how often pain is an issue (e.g., several times per day, once every few days, constant)     FUNCTIONAL ASSESSMENT:     Palliative Performance Scale (PPS):  PPS: 70       PSYCHOSOCIAL/SPIRITUAL SCREENING:     Palliative IDT has assessed this patient for cultural preferences / practices and a referral made as appropriate to needs (Cultural Services, Patient Advocacy, Ethics, etc.)    Advance Care Planning:  Advance Care Planning 3/15/2018   Patient's Healthcare Decision Maker is: Named in scanned ACP document   Primary Decision Maker Name Faina Do   Primary Decision Maker Phone Number [phone]   Primary Decision Maker Relationship to Patient Parent   Secondary Decision Maker Name Anila Portillo   Secondary Decision Maker Phone Number [phone]   Secondary Decision Maker Relationship to Patient Unknown   Confirm Advance Directive Yes, on file   Patient Would Like to Complete Advance Directive -   Does the patient have other document types -       Any spiritual / Church concerns:  [] Yes /  [x] No    Caregiver Burnout:  [] Yes /  [x] No /  [] No Caregiver Present      Anticipatory grief assessment:   [x] Normal  / [] Maladaptive       ESAS Anxiety: Anxiety: 0    ESAS Depression: Depression: 0        REVIEW OF SYSTEMS:     Positive and pertinent negative findings in ROS are noted above in HPI. The following systems were [x] reviewed / [] unable to be reviewed as noted in HPI  Other findings are noted below. Systems: constitutional, ears/nose/mouth/throat, respiratory, gastrointestinal, genitourinary, musculoskeletal, integumentary, neurologic, psychiatric, endocrine. Positive findings noted below. Modified ESAS Completed by: provider   Fatigue: 2 Drowsiness: 0   Depression: 0 Pain: 7   Anxiety: 0 Nausea: 0   Anorexia: 0 Dyspnea: 0     Constipation: No              PHYSICAL EXAM:     From RN flowsheet:  Wt Readings from Last 3 Encounters:   05/22/18 313 lb 11.4 oz (142.3 kg)   03/05/18 304 lb 3.2 oz (138 kg)   02/28/18 306 lb (138.8 kg)     Blood pressure 120/77, pulse 90, temperature 98.1 °F (36.7 °C), resp. rate 20, height 5' 4\" (1.626 m), weight 313 lb 11.4 oz (142.3 kg), SpO2 100 %.     Pain Scale 1: Numeric (0 - 10)  Pain Intensity 1: 7  Pain Onset 1: chronic  Pain Location 1: Abdomen  Pain Orientation 1: Anterior  Pain Description 1: Aching, Gnawing  Pain Intervention(s) 1: Medication (see MAR)  Last bowel movement, if known: stool output in ostomy     Constitutional: awake, alert, oriented, no sedation or confusion   Eyes: pupils equal, anicteric  ENMT: moist mucous membranes, no nasal discharge  Respiratory: breathing not labored, symmetric  Cardiovascular: regular rhythm  Gastrointestinal: soft, + bowel sounds, wound clean   Musculoskeletal: no deformity, can raise b/l LEs in bed  Skin: warm, dry, mild erythema over lower back where lidoderm patches were, no open areas  Ext: B/l pitting LE edema   Neurologic: following commands, moving all extremities  Psychiatric: full affect, no hallucinations         HISTORY:     Active Problems:    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)      Past Medical History:   Diagnosis Date    Adverse effect of anesthesia     WOKE DURING SURGERY    Arthritis     Blood clot in vein 2017    STOMACH    Crohn's disease ([facility] 8/15/2011    DVT (deep venous thrombosis) ([facility] 8/15/2011    LEFT LEG    Edema     GENERALIZED R/T TPN; WAIST DOWN    Incarcerated ventral hernia 8/15/2011    Lupus     Lyme disease     Psychiatric disorder     ANXIETY    Right flank pain 8/22/2011    Seizures ([facility] 1990    LAST AT AGE 17    Stroke [facility]) 1990    age 17; A WEEK POST OP, HAD SEIZURES AND STROKE, WAS IN COMA FOR A MONTH    Thyroid disease     HYPO-NO MEDS      Past Surgical History:   Procedure Laterality Date    HX APPENDECTOMY      HX GYN  2015    HIDRADENTIS LABIA    HX OTHER SURGICAL      multiple procedures related to her Crohn's disease    HX OTHER SURGICAL      ABDOMINAL SURGERY REMOVING COLON, 2/3 STOMACH, 1/3 SMALL INTESTINE    [place] LAP, INCISIONAL HERNIA REPAIR,INCARCERATED  9-10-08    dr. Allen Zohu      Family History   Problem Relation Age of Onset    Hypertension Father     Stroke Father     Other Father      arthritis    Arthritis-osteo Father     Hypertension Mother     Arthritis-osteo Mother     Anesth Problems Neg Hx       History reviewed, no pertinent family history.   Social History   Substance Use Topics    Smoking status: Former Smoker     Packs/day: 1.00     Years: 16.00     Quit date: 2/27/2017    Smokeless tobacco: Former User      Comment: OFF AND ON    Alcohol use No     Allergies   Allergen Reactions    Sulfa (Sulfonamide Antibiotics) Anaphylaxis    Demerol [Meperidine] Rash    Soma [Carisoprodol] Rash and Nausea Only    Toradol [Ketorolac Tromethamine] Nausea and Vomiting      Current Facility-Administered Medications   Medication Dose Route Frequency    TPN ADULT - CENTRAL   IntraVENous TITRATE    morphine CR (MS CONTIN) tablet 100 mg 100 mg Oral Q8H    morphine IR (MS IR) tablet 30 mg  30 mg Oral Q3H    fat emulsion 20% (LIPOSYN, INTRAlipid) infusion 250 mL  250 mL IntraVENous Q MON, WED & FRI    guaiFENesin (ROBITUSSIN) 100 mg/5 mL oral liquid 100 mg  100 mg Oral Q4H PRN    morphine IR (MS IR) tablet 30 mg  30 mg Oral DAILY PRN    lidocaine (LIDODERM) 5 % patch 2 Patch  2 Patch TransDERmal Q24H    0.9% sodium chloride infusion 250 mL  250 mL IntraVENous PRN    aspirin (ASPIRIN) tablet 325 mg  325 mg Oral DAILY    0.9% sodium chloride infusion 250 mL  250 mL IntraVENous PRN    acetaminophen (TYLENOL) tablet 650 mg  650 mg Oral Q6H PRN    diphenhydrAMINE (BENADRYL) capsule 25 mg  25 mg Oral Q6H PRN    enoxaparin (LOVENOX) injection 40 mg  40 mg SubCUTAneous Q24H    nystatin (MYCOSTATIN) 100,000 unit/gram cream   Topical BID    prochlorperazine (COMPAZINE) with saline injection 5 mg  5 mg IntraVENous Q6H PRN    naloxone (NARCAN) injection 0.4 mg  0.4 mg IntraVENous EVERY 2 MINUTES AS NEEDED    glucose chewable tablet 16 g  4 Tab Oral PRN    dextrose (D50W) injection syrg 12.5-25 g  12.5-25 g IntraVENous PRN    glucagon (GLUCAGEN) injection 1 mg  1 mg IntraMUSCular PRN    scopolamine (TRANSDERM-SCOP) 1 mg over 3 days 1 Patch  1 Patch TransDERmal Q72H    sodium chloride (NS) flush 10 mL  10 mL InterCATHeter Q24H    sodium chloride (NS) flush 10 mL  10 mL InterCATHeter Q8H    alteplase (CATHFLO) 1 mg in sterile water (preservative free) 1 mL injection  1 mg InterCATHeter PRN    bacitracin 500 unit/gram packet 1 Packet  1 Packet Topical PRN    sodium chloride (NS) flush 20 mL  20 mL InterCATHeter PRN    sodium chloride (NS) flush 10 mL  10 mL InterCATHeter Q24H    sodium chloride (NS) flush 10 mL  10 mL InterCATHeter PRN    sodium chloride (NS) flush 10 mL  10 mL InterCATHeter Q8H    dronabinol (MARINOL) capsule 2.5 mg  2.5 mg Oral BID    phenol throat spray (CHLORASEPTIC) 1 Spray  1 Spray Oral PRN    ondansetron (ZOFRAN) injection 4 mg  4 mg IntraVENous Q4H PRN    promethazine (PHENERGAN) 12.5 mg in 0.9% sodium chloride 50 mL IVPB  12.5 mg IntraVENous Q6H PRN    LORazepam (ATIVAN) injection 1 mg  1 mg IntraVENous Q6H PRN    albuterol (PROVENTIL VENTOLIN) nebulizer solution 2.5 mg  2.5 mg Nebulization Q4H PRN          LAB AND IMAGING FINDINGS:     Lab Results   Component Value Date/Time    WBC 10.3 05/21/2018 04:44 AM    HGB 7.4 (L) 05/21/2018 04:44 AM    PLATELET 884 81/33/7529 04:44 AM     Lab Results   Component Value Date/Time    Sodium 138 05/21/2018 04:44 AM    Potassium 3.6 05/21/2018 04:44 AM    Chloride 112 (H) 05/21/2018 04:44 AM    CO2 16 (L) 05/21/2018 04:44 AM    BUN 32 (H) 05/21/2018 04:44 AM    Creatinine 0.76 05/21/2018 04:44 AM    Calcium 9.3 05/21/2018 04:44 AM    Magnesium 1.7 05/17/2018 05:29 AM    Phosphorus 1.9 (L) 05/17/2018 05:29 AM      Lab Results   Component Value Date/Time    AST (SGOT) 27 05/21/2018 04:44 AM    Alk. phosphatase 176 (H) 05/21/2018 04:44 AM    Protein, total 6.8 05/21/2018 04:44 AM    Albumin 1.8 (L) 05/21/2018 04:44 AM    Globulin 5.0 (H) 05/21/2018 04:44 AM     Lab Results   Component Value Date/Time    INR 1.0 12/07/2017 08:37 AM    Prothrombin time 10.0 12/07/2017 08:37 AM      No results found for: IRON, FE, TIBC, IBCT, PSAT, FERR   No results found for: PH, PCO2, PO2  No components found for: GLPOC   No results found for: CPK, CKMB             Total time:   Counseling / coordination time, spent as noted above:   > 50% counseling / coordination?:     Prolonged service was provided for  []30 min   []75 min in face to face time in the presence of the patient, spent as noted above. Time Start:   Time End:   Note: this can only be billed with 43488 (initial) or 68213 (follow up). If multiple start / stop times, list each separately.

## 2018-05-22 NOTE — PROGRESS NOTES
Bedside shift change report given to Geri Stern RN and Janett Fabian RN (oncoming nurse) by Elisha Jones RN (offgoing nurse). Report included the following information SBAR, Kardex, Intake/Output, MAR and Recent Results.

## 2018-05-22 NOTE — PROGRESS NOTES
Bedside shift change report given to [address] (oncoming nurse) by Joce Philip (offgoing nurse). Report included the following information SBAR, Kardex, Procedure Summary, Intake/Output and Recent Results.

## 2018-05-22 NOTE — PROGRESS NOTES
Progress Note    Patient: Ibeth Walsh MRN: [id number]  SSN: [id number]    YOB: 1975  Age: 38 y.o. Sex: female      Admit Date: 3/7/2018    40 Days Post-Op    Procedure:  Procedure(s):  OPEN JEJUNOSTOMY TUBE PLACEMENT UNDER FLURO/ WOUND VAC CHANGE. Subjective:     No acute surgical issues. Pt reported some incisional pain. Wound care performed by patient and wound care nurse yesterday. Objective:     Visit Vitals    /77 (BP 1 Location: Right leg, BP Patient Position: At rest)    Pulse 90    Temp 98.1 °F (36.7 °C)    Resp 20    Ht 5' 4\" (1.626 m)    Wt 313 lb 11.4 oz (142.3 kg)    SpO2 100%    BMI 53.85 kg/m2       Temp (24hrs), Av.5 °F (36.9 °C), Min:98.1 °F (36.7 °C), Max:98.8 °F (37.1 °C)        Physical Exam:    Gen:  NAD  Pulm:  Unlabored  Abd:  S/ND/appropriate TTP  Wound:  Large midline abdominal wound with productive fistula output. Recent Results (from the past 24 hour(s))   GLUCOSE, POC    Collection Time: 18  6:22 AM   Result Value Ref Range    Glucose (POC) 200 (H) 65 - 100 mg/dL    Performed by [facility]          Assessment:     Hospital Problems  Date Reviewed: 10/27/2017          Codes Class Noted POA    Small bowel fistula ICD-10-CM: K63.2  ICD-9-CM: 569.81  3/7/2018 Unknown              Plan/Recommendations/Medical Decision Making:     - Continue local wound care  - Continue TPN  - Possible DC home today if able to setup TPN    Patient will need home TPN due to:    1. She has approximately 125 cm of small bowel left from ligament of Treitze (less than 5 feet) and does not have an ileocecal valve. She   Also has malabsorption from Crohn's disease    2. TPN is her sole source of nutrition as she did not tolerate tubefeeding and oral diet intake is only for pleasure    3.  Will need TPN for greater than 90 days     Signed By: Shila Kennedy MD     May 22, 2018

## 2018-05-22 NOTE — PROGRESS NOTES
Bedside shift change report given to Kristyn Brandt RN and Olena Rosado RN (oncoming nurse) by Amelia Edwards RN (offgoing nurse). Report included the following information SBAR, Kardex, Intake/Output, MAR and Recent Results.

## 2018-05-23 VITALS
DIASTOLIC BLOOD PRESSURE: 76 MMHG | RESPIRATION RATE: 18 BRPM | BODY MASS INDEX: 50.02 KG/M2 | HEIGHT: 64 IN | HEART RATE: 100 BPM | OXYGEN SATURATION: 100 % | SYSTOLIC BLOOD PRESSURE: 121 MMHG | WEIGHT: 293 LBS | TEMPERATURE: 98.1 F

## 2018-05-23 LAB
GLUCOSE BLD STRIP.AUTO-MCNC: 143 MG/DL (ref 65–100)
SERVICE CMNT-IMP: ABNORMAL

## 2018-05-23 PROCEDURE — 74011250636 HC RX REV CODE- 250/636: Performed by: NURSE PRACTITIONER

## 2018-05-23 PROCEDURE — 74011250637 HC RX REV CODE- 250/637: Performed by: PHYSICAL MEDICINE & REHABILITATION

## 2018-05-23 PROCEDURE — 74011250636 HC RX REV CODE- 250/636: Performed by: SURGERY

## 2018-05-23 PROCEDURE — 74011250637 HC RX REV CODE- 250/637: Performed by: NURSE PRACTITIONER

## 2018-05-23 PROCEDURE — 74011250637 HC RX REV CODE- 250/637: Performed by: PHYSICIAN ASSISTANT

## 2018-05-23 PROCEDURE — 82962 GLUCOSE BLOOD TEST: CPT

## 2018-05-23 RX ADMIN — DRONABINOL 2.5 MG: 2.5 CAPSULE ORAL at 08:58

## 2018-05-23 RX ADMIN — ASPIRIN 325 MG: 325 TABLET ORAL at 08:58

## 2018-05-23 RX ADMIN — MORPHINE SULFATE 30 MG: 15 TABLET ORAL at 07:04

## 2018-05-23 RX ADMIN — ONDANSETRON HYDROCHLORIDE 4 MG: 2 INJECTION INTRAMUSCULAR; INTRAVENOUS at 11:30

## 2018-05-23 RX ADMIN — Medication 10 ML: at 07:04

## 2018-05-23 RX ADMIN — Medication 10 ML: at 11:31

## 2018-05-23 RX ADMIN — ENOXAPARIN SODIUM 40 MG: 100 INJECTION SUBCUTANEOUS at 10:19

## 2018-05-23 RX ADMIN — MORPHINE SULFATE 30 MG: 15 TABLET ORAL at 01:48

## 2018-05-23 RX ADMIN — MORPHINE SULFATE 100 MG: 100 TABLET, FILM COATED, EXTENDED RELEASE ORAL at 06:06

## 2018-05-23 RX ADMIN — MORPHINE SULFATE 100 MG: 100 TABLET, FILM COATED, EXTENDED RELEASE ORAL at 14:38

## 2018-05-23 RX ADMIN — Medication 10 ML: at 09:00

## 2018-05-23 RX ADMIN — LORAZEPAM 1 MG: 2 INJECTION INTRAMUSCULAR; INTRAVENOUS at 14:37

## 2018-05-23 RX ADMIN — MORPHINE SULFATE 30 MG: 15 TABLET ORAL at 13:49

## 2018-05-23 RX ADMIN — MORPHINE SULFATE 30 MG: 15 TABLET ORAL at 10:18

## 2018-05-23 RX ADMIN — MORPHINE SULFATE 30 MG: 15 TABLET ORAL at 04:28

## 2018-05-23 NOTE — PROGRESS NOTES
Reviewed medical chart; met with the patient at the bedside. [employer] did approve the patient's TPN. Patient will return home today. [facility] with [facility]. Leon Bell from [facility] and Chasidy from [facility] were both called and are aware of the discharge. Patient will need ambulance transportation. Referral sent via [employer] to [facility] for a 1:00PM  time. [facility] confirmed that they are able to pick the patient up at 2:30PM.  Care Management will continue to follow her disposition.    ROBERT Lemus

## 2018-05-23 NOTE — PROGRESS NOTES
Progress Note    Patient: Chilo Medina MRN: [id number]  SSN: [id number]    YOB: 1975  Age: 38 y.o. Sex: female      Admit Date: 3/7/2018    41 Days Post-Op    Procedure:  Procedure(s):  OPEN JEJUNOSTOMY TUBE PLACEMENT UNDER FLURO/ WOUND VAC CHANGE. Subjective:     No acute surgical issues. Pt doing well overall. Got insurance approval for home TPN which she will likely require longterm. Objective:     Visit Vitals    /76    Pulse 100    Temp 98.1 °F (36.7 °C)    Resp 18    Ht 5' 4\" (1.626 m)    Wt 324 lb 12.8 oz (147.3 kg)    SpO2 100%    BMI 55.75 kg/m2       Temp (24hrs), Av.3 °F (36.8 °C), Min:98.1 °F (36.7 °C), Max:98.5 °F (36.9 °C)        Physical Exam:    Gen:  NAD  Pulm:  Unlabored  Abd:  S/ND/appropriate TTP  Wound:  Large midline abdominal wound with productive fistula output. Recent Results (from the past 24 hour(s))   GLUCOSE, POC    Collection Time: 18 11:24 AM   Result Value Ref Range    Glucose (POC) 143 (H) 65 - 100 mg/dL    Performed by Monday  [address] Problems  Date Reviewed: 10/27/2017          Codes Class Noted POA    Small bowel fistula ICD-10-CM: K63.2  ICD-9-CM: 569.81  3/7/2018 Unknown              Plan/Recommendations/Medical Decision Making:     - Continue local wound care  - Continue TPN  - Plan DC home today    Patient will need home TPN due to:    1. She has approximately 125 cm of small bowel left from ligament of Treitze (less than 5 feet) and does not have an ileocecal valve. She   Also has malabsorption from Crohn's disease    2. TPN is her sole source of nutrition as she did not tolerate tubefeeding and oral diet intake is only for pleasure    3.  Will need TPN for greater than 90 days     Signed By: Rufina Metcalf MD     May 23, 2018

## 2018-05-23 NOTE — PROGRESS NOTES
Follow up visit with Jim Franco as she was preparing for discharge. Several staff members were stopping by and wishing pt well. Assurance of prayers offered to Jim Franco and her mother as she transitions home.     Bhumika Peoples, Palliative

## 2018-05-23 NOTE — PROGRESS NOTES
Bedside shift change report given to Kati Shelton (oncoming nurse) by Myra Macias (offgoing nurse). Report included the following information SBAR, Kardex, Intake/Output and Recent Results.

## 2018-05-24 ENCOUNTER — HOME CARE VISIT (OUTPATIENT)
Dept: SCHEDULING | Facility: HOME HEALTH | Age: 43
End: 2018-05-24
Payer: MEDICARE

## 2018-05-24 VITALS
OXYGEN SATURATION: 99 % | HEART RATE: 110 BPM | SYSTOLIC BLOOD PRESSURE: 110 MMHG | TEMPERATURE: 97.8 F | DIASTOLIC BLOOD PRESSURE: 68 MMHG | RESPIRATION RATE: 18 BRPM

## 2018-05-24 PROCEDURE — 400013 HH SOC

## 2018-05-24 PROCEDURE — G0299 HHS/HOSPICE OF RN EA 15 MIN: HCPCS

## 2018-05-24 RX ORDER — DRONABINOL 5 MG/1
5 CAPSULE ORAL
Qty: 40 CAP | Refills: 0 | Status: SHIPPED | OUTPATIENT
Start: 2018-05-24 | End: 2018-06-12 | Stop reason: SDUPTHER

## 2018-05-25 ENCOUNTER — TELEPHONE (OUTPATIENT)
Dept: SURGERY | Age: 43
End: 2018-05-25

## 2018-05-25 NOTE — TELEPHONE ENCOUNTER
I received fax from [employer] [phone] requesting prior auth on Dronabinol (Marinol) 5 mg. #40 one cap by mouth two times daily as needed for nausea. No refills. I contacted Sherry Ball [phone] prior auth dept and spoke with [facility] spoke with Jose,pharmacist and medication was approved for 1 month-Approval # [id number] exp. 6/25/2018. I contacted pharmacy with approval info and contacted patient as well.

## 2018-05-28 ENCOUNTER — HOME CARE VISIT (OUTPATIENT)
Dept: SCHEDULING | Facility: HOME HEALTH | Age: 43
End: 2018-05-28
Payer: MEDICARE

## 2018-05-28 VITALS
DIASTOLIC BLOOD PRESSURE: 87 MMHG | HEIGHT: 64 IN | WEIGHT: 293 LBS | SYSTOLIC BLOOD PRESSURE: 111 MMHG | BODY MASS INDEX: 50.02 KG/M2 | TEMPERATURE: 97 F | OXYGEN SATURATION: 98 % | HEART RATE: 93 BPM

## 2018-05-28 PROCEDURE — G0300 HHS/HOSPICE OF LPN EA 15 MIN: HCPCS

## 2018-05-28 PROCEDURE — G0151 HHCP-SERV OF PT,EA 15 MIN: HCPCS

## 2018-05-30 ENCOUNTER — HOME CARE VISIT (OUTPATIENT)
Dept: SCHEDULING | Facility: HOME HEALTH | Age: 43
End: 2018-05-30
Payer: MEDICARE

## 2018-05-30 PROCEDURE — A4406 PECTIN BASED OSTOMY PASTE: HCPCS

## 2018-05-30 PROCEDURE — A4385 OST SKN BARRIER SLD EXT WEAR: HCPCS

## 2018-05-30 PROCEDURE — G0155 HHCP-SVS OF CSW,EA 15 MIN: HCPCS

## 2018-05-30 PROCEDURE — A4409 OST SKN BARR CONVEX <=4 SQ I: HCPCS

## 2018-05-31 ENCOUNTER — HOME CARE VISIT (OUTPATIENT)
Dept: SCHEDULING | Facility: HOME HEALTH | Age: 43
End: 2018-05-31
Payer: MEDICARE

## 2018-05-31 VITALS
RESPIRATION RATE: 18 BRPM | DIASTOLIC BLOOD PRESSURE: 81 MMHG | BODY MASS INDEX: 50.77 KG/M2 | SYSTOLIC BLOOD PRESSURE: 111 MMHG | OXYGEN SATURATION: 98 % | TEMPERATURE: 97.1 F | HEART RATE: 102 BPM | WEIGHT: 293 LBS

## 2018-05-31 VITALS
OXYGEN SATURATION: 98 % | HEART RATE: 88 BPM | TEMPERATURE: 98.6 F | RESPIRATION RATE: 18 BRPM | SYSTOLIC BLOOD PRESSURE: 111 MMHG | DIASTOLIC BLOOD PRESSURE: 87 MMHG

## 2018-05-31 PROCEDURE — G0300 HHS/HOSPICE OF LPN EA 15 MIN: HCPCS

## 2018-06-01 ENCOUNTER — HOME CARE VISIT (OUTPATIENT)
Dept: HOME HEALTH SERVICES | Facility: HOME HEALTH | Age: 43
End: 2018-06-01
Payer: MEDICARE

## 2018-06-01 VITALS
HEART RATE: 116 BPM | OXYGEN SATURATION: 98 % | RESPIRATION RATE: 16 BRPM | TEMPERATURE: 97.5 F | DIASTOLIC BLOOD PRESSURE: 84 MMHG | SYSTOLIC BLOOD PRESSURE: 111 MMHG

## 2018-06-01 PROCEDURE — A7000 DISPOSABLE CANISTER FOR PUMP: HCPCS

## 2018-06-01 PROCEDURE — G0157 HHC PT ASSISTANT EA 15: HCPCS

## 2018-06-01 PROCEDURE — A4385 OST SKN BARRIER SLD EXT WEAR: HCPCS

## 2018-06-01 PROCEDURE — A4425 OST PCH DRAIN FOR BARRIER FL: HCPCS

## 2018-06-01 PROCEDURE — A4406 PECTIN BASED OSTOMY PASTE: HCPCS

## 2018-06-03 NOTE — DISCHARGE SUMMARY
Physician Discharge Summary     Patient ID:  Khadra Jason  [id number]  38 y.o.  1975    Allergies: Sulfa (sulfonamide antibiotics); Demerol [meperidine]; Soma [carisoprodol]; and Toradol [ketorolac tromethamine]    Admit Date: 3/7/2018    Discharge Date: 5/23/2018    * Admission Diagnoses: FISTULA;Small bowel fistula;INTEROCUTANOUS FISTULA    * Discharge Diagnoses:    Hospital Problems as of 5/23/2018  Date Reviewed: 10/27/2017          Codes Class Noted - Resolved POA    Small bowel fistula ICD-10-CM: K63.2  ICD-9-CM: 569.81  3/7/2018 - Present Unknown               Admission Condition: Fair    * Discharge Condition: improved    * Procedures: Procedure(s):  OPEN JEJUNOSTOMY TUBE PLACEMENT UNDER FLURO/ WOUND VAC CHANGE. [facility] Course:   Ms. Zulema Hopkins is a 37 yo woman with hx Crohn's disease s/p total colectomy with end ileostomy who developed an EC fistula. She was admitted for fistula takedown. Initially patient improved after the operation. She then developed an ileus and fascial dehiscence. An EC fistula then recurred. She then had a prolonged hospital stay to control the [facility] fistula and wound care. Pt had a take back for feeding tube placement through fistula. Feeding tube however continues to slip out and effort was abandon as there was minimal small bowel length distal to fistula for tubefeeding. Pt was placed on diet for comfort and relied on TPN for nutrition needs. Palliative Service was consulted for pain control. Her fistula was management by wound care. Once fistula output was under control, she was discharged home with home health PT and TPN. Consults: Palliative Care    Significant Diagnostic Studies: radiology: CT scan: Abdomen and pelvis.   Upper GI with small bowel follow-through    * Disposition: Home    Discharge Medications:   Discharge Medication List as of 5/23/2018 12:20 PM      START taking these medications    Details   aspirin (ASPIRIN) 325 mg tablet Take 1 Tab by mouth daily. , Normal, Disp-30 Tab, R-0         CONTINUE these medications which have CHANGED    Details   clonazePAM (KLONOPIN) 0.5 mg tablet Take 1 Tab by mouth two (2) times daily as needed. Max Daily Amount: 1 mg. Indications: anxiety, Print, Disp-30 Tab, R-0      morphine IR (MS IR) 15 mg tablet Take 3 Tabs by mouth every four (4) hours as needed for Pain. Max Daily Amount: 270 mg., Print, Disp-60 Tab, R-0      morphine CR (MS CONTIN) 100 mg CR tablet Take 1 Tab by mouth every eight (8) hours. Max Daily Amount: 300 mg., Print, Disp-60 Tab, R-0         CONTINUE these medications which have NOT CHANGED    Details   albuterol (PROVENTIL HFA, VENTOLIN HFA, PROAIR HFA) 90 mcg/actuation inhaler Take 2 Puffs by inhalation every four (4) hours as needed., Historical Med      ascorbic acid, vitamin C, (VITAMIN C) 250 mg tablet Take 250 mg by mouth two (2) times a day., Historical Med      promethazine (PHENERGAN) 25 mg tablet Take 25 mg by mouth every six (6) hours as needed for Nausea., Historical Med      VITAMIN B-12 5,000 mcg subl Take 5,000 mcg by mouth daily. , Historical Med, R-1, VALENTIN      pantoprazole (PROTONIX) 40 mg tablet Take 1 Tab by mouth Daily (before breakfast). , Print, Disp-30 Tab, R-0      ondansetron (ZOFRAN ODT) 4 mg disintegrating tablet Take 1 Tab by mouth every eight (8) hours as needed for Nausea. , Print, Disp-20 Tab, R-0      insulin lispro (HUMALOG) 100 unit/mL injection As instructed on discharge instructions, Print, Disp-1 Vial, R-0      acetaminophen (TYLENOL) 325 mg tablet Take 650 mg by mouth every six (6) hours as needed for Pain., Historical Med         STOP taking these medications       HEPARIN SOD,PORCINE/0.9 % NACL (HEPARIN FLUSH IV) Comments:   Reason for Stopping:         fat emulsion 20% (LIPOSYN, INTRALIPID) 20 % infusion Comments:   Reason for Stopping:         enoxaparin (LOVENOX) injection Comments:   Reason for Stopping:         sodium chloride (NORMAL SALINE FLUSH) 0.9 % Comments:   Reason for Stopping:         fat emulsion 20% (LIPOSYN, INTRALIPID) 20 % infusion Comments:   Reason for Stopping:         predniSONE (DELTASONE) 20 mg tablet Comments:   Reason for Stopping:         clopidogrel (PLAVIX) 75 mg tab Comments:   Reason for Stopping:         cyanocobalamin (VITAMIN B12) 100 mcg tablet Comments:   Reason for Stopping:               * Follow-up Care/Patient Instructions: Activity: Activity as tolerated  Diet: Regular Diet  Wound Care: As directed    Follow-up Information     Follow up With Details Comments [address] [facility] In 1 day Skilled Nursing for Tube Feed Education and Wound Care. Please call P#[phone] if you do not hear from [facility] by 11AM the day after discharge. [address] [facility] In 1 day Home TPN. [address]    Anibal Bailey MD  Patients are required to make their own appointments with this practice.    [facility]  [phone]          Follow-up tests/labs CBC and CMP twice weekly    Signed:  Arlet Shook MD  6/3/2018  4:11 PM

## 2018-06-04 ENCOUNTER — HOME CARE VISIT (OUTPATIENT)
Dept: SCHEDULING | Facility: HOME HEALTH | Age: 43
End: 2018-06-04
Payer: MEDICARE

## 2018-06-04 VITALS
OXYGEN SATURATION: 97 % | HEART RATE: 96 BPM | SYSTOLIC BLOOD PRESSURE: 111 MMHG | RESPIRATION RATE: 16 BRPM | TEMPERATURE: 97.1 F | DIASTOLIC BLOOD PRESSURE: 79 MMHG

## 2018-06-04 VITALS
BODY MASS INDEX: 49.19 KG/M2 | WEIGHT: 286.6 LBS | SYSTOLIC BLOOD PRESSURE: 111 MMHG | OXYGEN SATURATION: 97 % | HEART RATE: 96 BPM | RESPIRATION RATE: 18 BRPM | DIASTOLIC BLOOD PRESSURE: 79 MMHG | TEMPERATURE: 97.1 F

## 2018-06-04 PROCEDURE — G0300 HHS/HOSPICE OF LPN EA 15 MIN: HCPCS

## 2018-06-04 PROCEDURE — G0157 HHC PT ASSISTANT EA 15: HCPCS

## 2018-06-05 ENCOUNTER — HOME CARE VISIT (OUTPATIENT)
Dept: SCHEDULING | Facility: HOME HEALTH | Age: 43
End: 2018-06-05
Payer: MEDICARE

## 2018-06-05 VITALS
DIASTOLIC BLOOD PRESSURE: 68 MMHG | TEMPERATURE: 98 F | RESPIRATION RATE: 18 BRPM | HEART RATE: 99 BPM | OXYGEN SATURATION: 97 % | SYSTOLIC BLOOD PRESSURE: 112 MMHG

## 2018-06-05 PROCEDURE — G0300 HHS/HOSPICE OF LPN EA 15 MIN: HCPCS

## 2018-06-06 ENCOUNTER — TELEPHONE (OUTPATIENT)
Dept: SURGERY | Age: 43
End: 2018-06-06

## 2018-06-06 ENCOUNTER — HOME CARE VISIT (OUTPATIENT)
Dept: HOME HEALTH SERVICES | Facility: HOME HEALTH | Age: 43
End: 2018-06-06
Payer: MEDICARE

## 2018-06-06 PROCEDURE — A4333 URINARY CATH ANCHOR DEVICE: HCPCS

## 2018-06-06 NOTE — TELEPHONE ENCOUNTER
Leo Khan would like to speak with you about this mutual patient. . She would like to discuss TPN and potential blood culture orders.

## 2018-06-06 NOTE — TELEPHONE ENCOUNTER
Spoke with Francie Ring, pt skipped her f/u appointment with Dr. Mu Heck because she did not feel well yesterday. Francie Ring nutritionist requesting blood cultures. WBC from 2 days ago was 12. Gave verbal consent but pt needs to be seen in office. Recommended that pt return for f/u and home health nurse call tomorrow to report on the pt. Francie Ring agreed to notify our office of blood culture results.

## 2018-06-07 ENCOUNTER — HOME CARE VISIT (OUTPATIENT)
Dept: SCHEDULING | Facility: HOME HEALTH | Age: 43
End: 2018-06-07
Payer: MEDICARE

## 2018-06-07 VITALS
HEART RATE: 108 BPM | SYSTOLIC BLOOD PRESSURE: 109 MMHG | RESPIRATION RATE: 18 BRPM | TEMPERATURE: 97.2 F | BODY MASS INDEX: 48.23 KG/M2 | DIASTOLIC BLOOD PRESSURE: 85 MMHG | OXYGEN SATURATION: 99 % | WEIGHT: 281 LBS

## 2018-06-07 VITALS
OXYGEN SATURATION: 99 % | HEART RATE: 108 BPM | TEMPERATURE: 97.2 F | DIASTOLIC BLOOD PRESSURE: 85 MMHG | SYSTOLIC BLOOD PRESSURE: 109 MMHG

## 2018-06-07 PROCEDURE — G0151 HHCP-SERV OF PT,EA 15 MIN: HCPCS

## 2018-06-07 PROCEDURE — G0300 HHS/HOSPICE OF LPN EA 15 MIN: HCPCS

## 2018-06-08 PROCEDURE — A7000 DISPOSABLE CANISTER FOR PUMP: HCPCS

## 2018-06-08 PROCEDURE — A6216 NON-STERILE GAUZE<=16 SQ IN: HCPCS

## 2018-06-11 ENCOUNTER — TELEPHONE (OUTPATIENT)
Dept: SURGERY | Age: 43
End: 2018-06-11

## 2018-06-11 ENCOUNTER — HOME CARE VISIT (OUTPATIENT)
Dept: SCHEDULING | Facility: HOME HEALTH | Age: 43
End: 2018-06-11
Payer: MEDICARE

## 2018-06-11 VITALS
WEIGHT: 280 LBS | SYSTOLIC BLOOD PRESSURE: 111 MMHG | OXYGEN SATURATION: 97 % | HEART RATE: 95 BPM | DIASTOLIC BLOOD PRESSURE: 84 MMHG | RESPIRATION RATE: 18 BRPM | TEMPERATURE: 97.7 F | BODY MASS INDEX: 48.06 KG/M2

## 2018-06-11 PROCEDURE — G0157 HHC PT ASSISTANT EA 15: HCPCS

## 2018-06-11 PROCEDURE — G0300 HHS/HOSPICE OF LPN EA 15 MIN: HCPCS

## 2018-06-11 NOTE — TELEPHONE ENCOUNTER
----- Message from Candy Herring sent at 6/6/2018  4:27 PM EDT -----  Regarding: Patient Follow Up   This patient rescheduled her appt for this upcoming Tues to se Dr. Gadiel Jackson. . Did she need to be seen before then? ... She says she cannot come in this week at all due to other things that she has to do.

## 2018-06-11 NOTE — TELEPHONE ENCOUNTER
called pt to have her come in as per phone call from the home dietician, pt was \"not feeling well. \" Per message below, pt was could not come in for office visit because she was too busy. Note that dietician reports that pt missed her appointment with Dr. Tosin Bejarano on 6/5/18. Pt scheduled to see Dr. Tosin Bejarano 6/12/18.

## 2018-06-12 ENCOUNTER — TELEPHONE (OUTPATIENT)
Dept: SURGERY | Age: 43
End: 2018-06-12

## 2018-06-12 ENCOUNTER — OFFICE VISIT (OUTPATIENT)
Dept: SURGERY | Age: 43
End: 2018-06-12

## 2018-06-12 VITALS
BODY MASS INDEX: 47.8 KG/M2 | SYSTOLIC BLOOD PRESSURE: 122 MMHG | WEIGHT: 280 LBS | RESPIRATION RATE: 20 BRPM | HEART RATE: 113 BPM | TEMPERATURE: 97.9 F | HEIGHT: 64 IN | DIASTOLIC BLOOD PRESSURE: 80 MMHG | OXYGEN SATURATION: 93 %

## 2018-06-12 DIAGNOSIS — D64.9 CHRONIC ANEMIA: Primary | ICD-10-CM

## 2018-06-12 DIAGNOSIS — R11.0 NAUSEA: ICD-10-CM

## 2018-06-12 PROCEDURE — A4333 URINARY CATH ANCHOR DEVICE: HCPCS

## 2018-06-12 PROCEDURE — A4385 OST SKN BARRIER SLD EXT WEAR: HCPCS

## 2018-06-12 PROCEDURE — A4406 PECTIN BASED OSTOMY PASTE: HCPCS

## 2018-06-12 RX ORDER — DRONABINOL 5 MG/1
5 CAPSULE ORAL
Qty: 60 CAP | Refills: 0 | Status: SHIPPED | OUTPATIENT
Start: 2018-06-12

## 2018-06-12 RX ORDER — LANOLIN ALCOHOL/MO/W.PET/CERES
325 CREAM (GRAM) TOPICAL
Qty: 30 TAB | Refills: 2 | Status: SHIPPED | OUTPATIENT
Start: 2018-06-12

## 2018-06-12 NOTE — PROGRESS NOTES
1. Have you been to the ER, urgent care clinic since your last visit? Hospitalized since your last visit? No    2. Have you seen or consulted any other health care providers outside of the [address] [place] [address] since your last visit? Include any pap smears or colon screening.  No

## 2018-06-13 ENCOUNTER — HOME CARE VISIT (OUTPATIENT)
Dept: HOME HEALTH SERVICES | Facility: HOME HEALTH | Age: 43
End: 2018-06-13
Payer: MEDICARE

## 2018-06-13 PROCEDURE — G0155 HHCP-SVS OF CSW,EA 15 MIN: HCPCS

## 2018-06-14 ENCOUNTER — HOME CARE VISIT (OUTPATIENT)
Dept: SCHEDULING | Facility: HOME HEALTH | Age: 43
End: 2018-06-14
Payer: MEDICARE

## 2018-06-14 ENCOUNTER — TELEPHONE (OUTPATIENT)
Dept: SURGERY | Age: 43
End: 2018-06-14

## 2018-06-14 VITALS
HEART RATE: 111 BPM | OXYGEN SATURATION: 98 % | SYSTOLIC BLOOD PRESSURE: 104 MMHG | RESPIRATION RATE: 18 BRPM | TEMPERATURE: 97.7 F | DIASTOLIC BLOOD PRESSURE: 52 MMHG

## 2018-06-14 VITALS
HEART RATE: 95 BPM | TEMPERATURE: 97.7 F | SYSTOLIC BLOOD PRESSURE: 111 MMHG | RESPIRATION RATE: 16 BRPM | OXYGEN SATURATION: 97 % | DIASTOLIC BLOOD PRESSURE: 84 MMHG

## 2018-06-14 DIAGNOSIS — T82.898A OCCLUSION OF PERIPHERALLY INSERTED CENTRAL CATHETER (PICC) LINE, INITIAL ENCOUNTER (HCC): Primary | ICD-10-CM

## 2018-06-14 PROCEDURE — G0299 HHS/HOSPICE OF RN EA 15 MIN: HCPCS

## 2018-06-14 NOTE — TELEPHONE ENCOUNTER
Spoke with [facility] nurse and no blood return, but using for TPN. Heparin flush used. Needs Activase to try and get blood return.   Insurance does not cover in the home setting and will see if [facility] can do

## 2018-06-15 ENCOUNTER — TELEPHONE (OUTPATIENT)
Dept: SURGERY | Age: 43
End: 2018-06-15

## 2018-06-15 ENCOUNTER — HOME CARE VISIT (OUTPATIENT)
Dept: SCHEDULING | Facility: HOME HEALTH | Age: 43
End: 2018-06-15
Payer: MEDICARE

## 2018-06-15 PROCEDURE — A4385 OST SKN BARRIER SLD EXT WEAR: HCPCS

## 2018-06-15 PROCEDURE — G0157 HHC PT ASSISTANT EA 15: HCPCS

## 2018-06-15 PROCEDURE — A4406 PECTIN BASED OSTOMY PASTE: HCPCS

## 2018-06-15 PROCEDURE — A4333 URINARY CATH ANCHOR DEVICE: HCPCS

## 2018-06-15 PROCEDURE — A6216 NON-STERILE GAUZE<=16 SQ IN: HCPCS

## 2018-06-15 NOTE — TELEPHONE ENCOUNTER
Spoke with pt and she has not heard from [facility].   Advised they will call her to schedule activase administration to try and clear her PICC line

## 2018-06-15 NOTE — TELEPHONE ENCOUNTER
Per Iman Rosenberg [phone] \"he doesn't work with [facility] anymore and all orders can be faxed to [phone] and that they have a whole staff that works on these\". I faxed order to above number [phone] and received confirmation. I called [facility]  [phone] And they said they did not receive it and it should be faxed to [phone] Coordination of care dept. P) [phone]. I called coordination of care dept. I had to leave a message on their line. I left message stating I had faxed order and received confirmation and patient would await their call for date and time for procedure.

## 2018-06-17 VITALS
OXYGEN SATURATION: 98 % | HEART RATE: 96 BPM | DIASTOLIC BLOOD PRESSURE: 87 MMHG | SYSTOLIC BLOOD PRESSURE: 111 MMHG | RESPIRATION RATE: 18 BRPM | TEMPERATURE: 97.6 F

## 2018-06-18 ENCOUNTER — HOME CARE VISIT (OUTPATIENT)
Dept: HOME HEALTH SERVICES | Facility: HOME HEALTH | Age: 43
End: 2018-06-18
Payer: MEDICARE

## 2018-06-18 ENCOUNTER — TELEPHONE (OUTPATIENT)
Dept: SURGERY | Age: 43
End: 2018-06-18

## 2018-06-18 NOTE — TELEPHONE ENCOUNTER
Spoke with Debra in [facility] and she tried [employer] and nothing scheduled for patient yet.     Contacted [facility] and spoke with Joseph Patel and she referred me to the coordination of care for scheduling for [address]  Fax [phone]  Will re fax the order as well     Spoke with Coordination of Care and they have no order; will re fax and # confirmed

## 2018-06-18 NOTE — TELEPHONE ENCOUNTER
Please call Debra regarding patient, stated that she was calling to follow up with Tee Erazo about patient. Wants to speak with Dereck specifically.

## 2018-06-19 ENCOUNTER — HOME CARE VISIT (OUTPATIENT)
Dept: SCHEDULING | Facility: HOME HEALTH | Age: 43
End: 2018-06-19
Payer: MEDICARE

## 2018-06-19 VITALS
RESPIRATION RATE: 18 BRPM | TEMPERATURE: 97.6 F | DIASTOLIC BLOOD PRESSURE: 61 MMHG | BODY MASS INDEX: 48.85 KG/M2 | SYSTOLIC BLOOD PRESSURE: 107 MMHG | OXYGEN SATURATION: 99 % | WEIGHT: 284.6 LBS | HEART RATE: 109 BPM

## 2018-06-19 PROCEDURE — G0300 HHS/HOSPICE OF LPN EA 15 MIN: HCPCS

## 2018-06-19 PROCEDURE — G0157 HHC PT ASSISTANT EA 15: HCPCS

## 2018-06-20 ENCOUNTER — HOME CARE VISIT (OUTPATIENT)
Dept: SCHEDULING | Facility: HOME HEALTH | Age: 43
End: 2018-06-20
Payer: MEDICARE

## 2018-06-20 VITALS
TEMPERATURE: 97.4 F | HEART RATE: 105 BPM | DIASTOLIC BLOOD PRESSURE: 71 MMHG | SYSTOLIC BLOOD PRESSURE: 109 MMHG | OXYGEN SATURATION: 98 % | RESPIRATION RATE: 16 BRPM

## 2018-06-20 VITALS
SYSTOLIC BLOOD PRESSURE: 109 MMHG | HEART RATE: 112 BPM | OXYGEN SATURATION: 96 % | DIASTOLIC BLOOD PRESSURE: 77 MMHG | BODY MASS INDEX: 48.32 KG/M2 | HEIGHT: 64 IN | WEIGHT: 283 LBS | TEMPERATURE: 98.7 F

## 2018-06-20 VITALS
OXYGEN SATURATION: 99 % | HEART RATE: 117 BPM | DIASTOLIC BLOOD PRESSURE: 80 MMHG | TEMPERATURE: 97.8 F | RESPIRATION RATE: 18 BRPM | SYSTOLIC BLOOD PRESSURE: 111 MMHG

## 2018-06-20 PROCEDURE — G0151 HHCP-SERV OF PT,EA 15 MIN: HCPCS

## 2018-06-20 PROCEDURE — G0299 HHS/HOSPICE OF RN EA 15 MIN: HCPCS

## 2018-06-25 ENCOUNTER — HOME CARE VISIT (OUTPATIENT)
Dept: SCHEDULING | Facility: HOME HEALTH | Age: 43
End: 2018-06-25
Payer: MEDICARE

## 2018-06-25 VITALS
BODY MASS INDEX: 48.51 KG/M2 | WEIGHT: 282.6 LBS | OXYGEN SATURATION: 98 % | TEMPERATURE: 97.3 F | DIASTOLIC BLOOD PRESSURE: 83 MMHG | SYSTOLIC BLOOD PRESSURE: 111 MMHG | RESPIRATION RATE: 18 BRPM | HEART RATE: 98 BPM

## 2018-06-25 PROCEDURE — G0300 HHS/HOSPICE OF LPN EA 15 MIN: HCPCS

## 2018-06-25 PROCEDURE — A4333 URINARY CATH ANCHOR DEVICE: HCPCS

## 2018-06-25 PROCEDURE — A4385 OST SKN BARRIER SLD EXT WEAR: HCPCS

## 2018-06-25 PROCEDURE — A4406 PECTIN BASED OSTOMY PASTE: HCPCS

## 2018-06-25 PROCEDURE — A4369 SKIN BARRIER LIQUID PER OZ: HCPCS

## 2018-06-25 PROCEDURE — A4357 BEDSIDE DRAINAGE BAG: HCPCS

## 2018-06-25 PROCEDURE — A6216 NON-STERILE GAUZE<=16 SQ IN: HCPCS

## 2018-06-28 ENCOUNTER — HOME CARE VISIT (OUTPATIENT)
Dept: SCHEDULING | Facility: HOME HEALTH | Age: 43
End: 2018-06-28
Payer: MEDICARE

## 2018-06-28 VITALS
RESPIRATION RATE: 18 BRPM | DIASTOLIC BLOOD PRESSURE: 58 MMHG | OXYGEN SATURATION: 98 % | SYSTOLIC BLOOD PRESSURE: 96 MMHG | HEART RATE: 105 BPM | WEIGHT: 279.4 LBS | TEMPERATURE: 97.2 F | BODY MASS INDEX: 47.96 KG/M2

## 2018-06-28 PROCEDURE — G0300 HHS/HOSPICE OF LPN EA 15 MIN: HCPCS

## 2018-07-02 ENCOUNTER — HOME CARE VISIT (OUTPATIENT)
Dept: SCHEDULING | Facility: HOME HEALTH | Age: 43
End: 2018-07-02
Payer: MEDICARE

## 2018-07-02 VITALS
TEMPERATURE: 97.9 F | BODY MASS INDEX: 48.3 KG/M2 | WEIGHT: 281.4 LBS | OXYGEN SATURATION: 98 % | HEART RATE: 95 BPM | SYSTOLIC BLOOD PRESSURE: 112 MMHG | RESPIRATION RATE: 18 BRPM | DIASTOLIC BLOOD PRESSURE: 84 MMHG

## 2018-07-02 PROCEDURE — G0300 HHS/HOSPICE OF LPN EA 15 MIN: HCPCS

## 2018-07-02 NOTE — PROGRESS NOTES
Reason for Visit:  Follow-up EC fistula    Brief History:  37 yo woman with hx Crohn's disease who developed EC fistula presented for follow-up. Pt has been doing okay since discharged. Tolerating diet and wound pouch is manageable. No fever or chills. Visit Vitals    /80 (BP 1 Location: Left arm, BP Patient Position: Sitting)    Pulse (!) 113    Temp 97.9 °F (36.6 °C) (Oral)    Resp 20    Ht 5' 4\" (1.626 m)    Wt 280 lb (127 kg)    SpO2 93%    BMI 48.06 kg/m2       Physical Exam:    Gen:  NAD  Abd:  S/ND/appropriate TTP  Midline abdominal Wound: With rosebud EC fistula. Minimal excoriated tissue around wound edge.     AP: 37 yo woman with EC fistula    - EC fistula:  Continue local wound care  - Diet as tolerated  - Continue TPN  - Twice weekly labs  - Follow-up in 1 month  - Ferrous sulfate for chronic anemia

## 2018-07-05 ENCOUNTER — HOME CARE VISIT (OUTPATIENT)
Dept: SCHEDULING | Facility: HOME HEALTH | Age: 43
End: 2018-07-05
Payer: MEDICARE

## 2018-07-05 VITALS
SYSTOLIC BLOOD PRESSURE: 132 MMHG | DIASTOLIC BLOOD PRESSURE: 82 MMHG | RESPIRATION RATE: 18 BRPM | HEART RATE: 107 BPM | TEMPERATURE: 98.4 F | OXYGEN SATURATION: 95 %

## 2018-07-05 PROCEDURE — A4456 ADHESIVE REMOVER, WIPES: HCPCS

## 2018-07-05 PROCEDURE — A4628 OROPHARYNGEAL SUCTION CATH: HCPCS

## 2018-07-05 PROCEDURE — A4333 URINARY CATH ANCHOR DEVICE: HCPCS

## 2018-07-05 PROCEDURE — A4406 PECTIN BASED OSTOMY PASTE: HCPCS

## 2018-07-05 PROCEDURE — A5120 SKIN BARRIER, WIPE OR SWAB: HCPCS

## 2018-07-05 PROCEDURE — A6216 NON-STERILE GAUZE<=16 SQ IN: HCPCS

## 2018-07-05 PROCEDURE — A4357 BEDSIDE DRAINAGE BAG: HCPCS

## 2018-07-05 PROCEDURE — G0299 HHS/HOSPICE OF RN EA 15 MIN: HCPCS

## 2018-07-09 ENCOUNTER — HOME CARE VISIT (OUTPATIENT)
Dept: SCHEDULING | Facility: HOME HEALTH | Age: 43
End: 2018-07-09
Payer: MEDICARE

## 2018-07-09 PROCEDURE — G0299 HHS/HOSPICE OF RN EA 15 MIN: HCPCS

## 2018-07-10 ENCOUNTER — OFFICE VISIT (OUTPATIENT)
Dept: SURGERY | Age: 43
End: 2018-07-10

## 2018-07-10 ENCOUNTER — TELEPHONE (OUTPATIENT)
Dept: SURGERY | Age: 43
End: 2018-07-10

## 2018-07-10 VITALS
TEMPERATURE: 98 F | HEART RATE: 120 BPM | DIASTOLIC BLOOD PRESSURE: 70 MMHG | OXYGEN SATURATION: 98 % | SYSTOLIC BLOOD PRESSURE: 122 MMHG | RESPIRATION RATE: 18 BRPM

## 2018-07-10 VITALS
HEART RATE: 120 BPM | RESPIRATION RATE: 18 BRPM | OXYGEN SATURATION: 98 % | DIASTOLIC BLOOD PRESSURE: 80 MMHG | SYSTOLIC BLOOD PRESSURE: 118 MMHG | TEMPERATURE: 98.2 F

## 2018-07-10 DIAGNOSIS — K63.2 ENTEROCUTANEOUS FISTULA: Primary | ICD-10-CM

## 2018-07-10 NOTE — PROGRESS NOTES
1. Have you been to the ER, urgent care clinic since your last visit? Hospitalized since your last visit? No    2. Have you seen or consulted any other health care providers outside of the [address] [place] [address] since your last visit? Include any pap smears or colon screening. Yes, Dr. Shmuel Geiger pain management      Patient states she need auth for Marinol signed by Dr. Moriah Valerio. Mother states she had original prescription in her purse and just went to fill it and pharmacy told her she needed new auth. Mother states she turned in old prescription to pharmacy. I asked patient if she had PICC line flushed as [facility], NP had placed order and patient states she did not have to go because a home health nurse came out and was able to get it working. Patient states she has home health coming out to her house on Mondays and Thursdays. Patient states she no longer has any type of wound packing.

## 2018-07-10 NOTE — TELEPHONE ENCOUNTER
Patients mother states she left the Marinol prescription in her purse and went to fill it recently and they told her the auth had . I asked the mother for the prescription back and she said she had left it with the pharmacy. I asked patient if that meant she hadn't  taken any since it hadn't been filled and she said that was correct. I called [phone] for prior auth. and spoke with Shayy and then Loren Molina pharmacist. Dronabinol (Marinol) 5 mg #60 take 1 capsule by mouth twice a day was approved by medardo Olmos for 1 month-expiring 8/10/18. I then called [facility] [phone] and spoke with pharmacist, Patito, and told her it had been approved. I noted on the faxed request it said 'date filled 18' and I asked her about this . Patito stated  that was the date it was sent for prior auth., not filled. She then stated the date it was filled was 18 #40 was filled for patient. She said Dr. Christopher Reyes had written the prescription. I told Patito the mother said she had left the prescription in her purse and then tried to fill it and the auth had , stating they had not gotten it filled and had left the prescription with the pharmacist. Bhanu Alvarado said they must have been mistaken and thanked me for getting the auth.

## 2018-07-12 ENCOUNTER — HOME CARE VISIT (OUTPATIENT)
Dept: SCHEDULING | Facility: HOME HEALTH | Age: 43
End: 2018-07-12
Payer: MEDICARE

## 2018-07-12 VITALS
DIASTOLIC BLOOD PRESSURE: 88 MMHG | SYSTOLIC BLOOD PRESSURE: 112 MMHG | OXYGEN SATURATION: 97 % | TEMPERATURE: 98.2 F | HEART RATE: 53 BPM | RESPIRATION RATE: 20 BRPM

## 2018-07-12 PROCEDURE — A4333 URINARY CATH ANCHOR DEVICE: HCPCS

## 2018-07-12 PROCEDURE — G0299 HHS/HOSPICE OF RN EA 15 MIN: HCPCS

## 2018-07-12 NOTE — PROGRESS NOTES
Reason for Visit:  Follow-up EC fistula    Brief History:  39 yo woman with hx Crohn's s/p colectomy with end ileostomy who subsequently developed ischemia bowel s/p ex lap with EC fistula s/p fistula takedown and now with recurrent EC fistula. Pt reported overall she is doing well. Tolerating diet with controlled output from EC fistula. Pt is continuing TPN at home. No fever or chills. She does report intermittent nausea and lack of appetite. Marinol appeared to help. Visit Vitals    /80 (BP 1 Location: Left arm, BP Patient Position: Sitting)    Pulse (!) 120    Temp 98.2 °F (36.8 °C) (Oral)    Resp 18    SpO2 98%         Physical Exam:    Gen:  NAD  Pulm:  Unlabored  Abd:  S/ND/appropriate TTP  Midline EC fistula:  Lumbee fistula with productive output. Minimal excoriated tissue at fistula wound edge    AP:  39 yo woman with EC fistula    - EC fistula:  Continue local wound care  - continue TPN  - Marinol for nausea and appetite stimulant  - Will discuss with [employer] drug rep regarding trial of medication for short gut syndrome  - Follow-up in 1 month.

## 2018-07-16 ENCOUNTER — HOME CARE VISIT (OUTPATIENT)
Dept: SCHEDULING | Facility: HOME HEALTH | Age: 43
End: 2018-07-16
Payer: MEDICARE

## 2018-07-16 VITALS
RESPIRATION RATE: 18 BRPM | DIASTOLIC BLOOD PRESSURE: 82 MMHG | WEIGHT: 279.8 LBS | HEART RATE: 92 BPM | SYSTOLIC BLOOD PRESSURE: 111 MMHG | BODY MASS INDEX: 48.03 KG/M2 | TEMPERATURE: 97.4 F | OXYGEN SATURATION: 99 %

## 2018-07-16 PROCEDURE — G0300 HHS/HOSPICE OF LPN EA 15 MIN: HCPCS

## 2018-07-17 ENCOUNTER — TELEPHONE (OUTPATIENT)
Dept: SURGERY | Age: 43
End: 2018-07-17

## 2018-07-17 NOTE — TELEPHONE ENCOUNTER
Patient never received services last time and nurse was able to get blood return.   Per [facility] nurse no blood return at this time   Will place order for [facility] to flush lines with Activase and attempt blood return   Lines flush easily just unable to get labs

## 2018-07-17 NOTE — TELEPHONE ENCOUNTER
Debra from [address] [place] [address] Piyush Miller has requested to speak with Abigail. . She says the patient's pickline has no blood return again.

## 2018-07-20 ENCOUNTER — TELEPHONE (OUTPATIENT)
Dept: SURGERY | Age: 43
End: 2018-07-20

## 2018-07-20 ENCOUNTER — HOME CARE VISIT (OUTPATIENT)
Dept: SCHEDULING | Facility: HOME HEALTH | Age: 43
End: 2018-07-20
Payer: MEDICARE

## 2018-07-20 ENCOUNTER — HOSPITAL ENCOUNTER (OUTPATIENT)
Dept: INFUSION THERAPY | Age: 43
Discharge: HOME | End: 2018-07-20
Payer: MEDICARE

## 2018-07-20 VITALS
RESPIRATION RATE: 18 BRPM | DIASTOLIC BLOOD PRESSURE: 61 MMHG | TEMPERATURE: 98.3 F | OXYGEN SATURATION: 99 % | HEART RATE: 110 BPM | SYSTOLIC BLOOD PRESSURE: 91 MMHG

## 2018-07-20 VITALS
HEART RATE: 105 BPM | SYSTOLIC BLOOD PRESSURE: 103 MMHG | OXYGEN SATURATION: 98 % | RESPIRATION RATE: 20 BRPM | DIASTOLIC BLOOD PRESSURE: 64 MMHG | TEMPERATURE: 98.6 F

## 2018-07-20 PROCEDURE — 36593 DECLOT VASCULAR DEVICE: CPT

## 2018-07-20 PROCEDURE — 74011000250 HC RX REV CODE- 250: Performed by: NURSE PRACTITIONER

## 2018-07-20 PROCEDURE — G0299 HHS/HOSPICE OF RN EA 15 MIN: HCPCS

## 2018-07-20 PROCEDURE — 74011250636 HC RX REV CODE- 250/636: Performed by: NURSE PRACTITIONER

## 2018-07-20 RX ADMIN — ALTEPLASE 2 MG: 2.2 INJECTION, POWDER, LYOPHILIZED, FOR SOLUTION INTRAVENOUS at 16:55

## 2018-07-20 NOTE — PROGRESS NOTES
1700 hrs Pt arrived at [facility] via wheelchair and in no distress for Declotting of VAD. Assessment completed, no new complaints voiced. D/L PICC intact to right arm; no blood return noted; no redness or swelling noted; apprx 2cm of exposed catheter; pt reports no change in exposed length. Alteplase administered. Blood pressure 103/64, pulse (!) 105, temperature 98.6 °F (37 °C), resp. rate 20, SpO2 98 %, not currently breastfeeding. 1540 Tolerated treatment well, no adverse reaction noted; positive blood return noted; PICC flushed per protocol; end caps changed. D/Cd from [facility] ambulatory and in no distress accompanied by mother. Next appt: No further appts needed at this time.

## 2018-07-20 NOTE — TELEPHONE ENCOUNTER
Please call Debra with [facility] [place] [facility]. She wanted to leave a message for Mee. She wants to know if lab work is needed for tomorrow.

## 2018-07-23 ENCOUNTER — HOME CARE VISIT (OUTPATIENT)
Dept: SCHEDULING | Facility: HOME HEALTH | Age: 43
End: 2018-07-23
Payer: MEDICARE

## 2018-07-23 VITALS
RESPIRATION RATE: 18 BRPM | TEMPERATURE: 97.8 F | HEART RATE: 103 BPM | BODY MASS INDEX: 48.27 KG/M2 | OXYGEN SATURATION: 99 % | SYSTOLIC BLOOD PRESSURE: 110 MMHG | WEIGHT: 281.2 LBS | DIASTOLIC BLOOD PRESSURE: 55 MMHG

## 2018-07-23 PROCEDURE — 400014 HH F/U

## 2018-07-23 PROCEDURE — 3331090001 HH PPS REVENUE CREDIT

## 2018-07-23 PROCEDURE — A4333 URINARY CATH ANCHOR DEVICE: HCPCS

## 2018-07-23 PROCEDURE — 3331090002 HH PPS REVENUE DEBIT

## 2018-07-23 PROCEDURE — G0300 HHS/HOSPICE OF LPN EA 15 MIN: HCPCS

## 2018-07-24 PROCEDURE — 3331090002 HH PPS REVENUE DEBIT

## 2018-07-24 PROCEDURE — 3331090001 HH PPS REVENUE CREDIT

## 2018-07-25 PROCEDURE — 3331090001 HH PPS REVENUE CREDIT

## 2018-07-25 PROCEDURE — 3331090002 HH PPS REVENUE DEBIT

## 2018-07-26 PROCEDURE — 3331090001 HH PPS REVENUE CREDIT

## 2018-07-26 PROCEDURE — 3331090002 HH PPS REVENUE DEBIT

## 2018-07-27 ENCOUNTER — HOME CARE VISIT (OUTPATIENT)
Dept: HOME HEALTH SERVICES | Facility: HOME HEALTH | Age: 43
End: 2018-07-27
Payer: MEDICARE

## 2018-07-27 PROCEDURE — 3331090001 HH PPS REVENUE CREDIT

## 2018-07-27 PROCEDURE — 3331090002 HH PPS REVENUE DEBIT

## 2018-07-28 PROCEDURE — 3331090002 HH PPS REVENUE DEBIT

## 2018-07-28 PROCEDURE — 3331090001 HH PPS REVENUE CREDIT

## 2018-07-29 PROCEDURE — 3331090002 HH PPS REVENUE DEBIT

## 2018-07-29 PROCEDURE — 3331090001 HH PPS REVENUE CREDIT

## 2018-07-30 ENCOUNTER — HOME CARE VISIT (OUTPATIENT)
Dept: SCHEDULING | Facility: HOME HEALTH | Age: 43
End: 2018-07-30
Payer: MEDICARE

## 2018-07-30 VITALS
WEIGHT: 276.2 LBS | HEART RATE: 94 BPM | DIASTOLIC BLOOD PRESSURE: 78 MMHG | BODY MASS INDEX: 47.41 KG/M2 | SYSTOLIC BLOOD PRESSURE: 108 MMHG | OXYGEN SATURATION: 99 % | RESPIRATION RATE: 18 BRPM | TEMPERATURE: 97.2 F

## 2018-07-30 PROCEDURE — G0300 HHS/HOSPICE OF LPN EA 15 MIN: HCPCS

## 2018-07-30 PROCEDURE — 3331090002 HH PPS REVENUE DEBIT

## 2018-07-30 PROCEDURE — 3331090001 HH PPS REVENUE CREDIT

## 2018-07-31 PROCEDURE — 3331090002 HH PPS REVENUE DEBIT

## 2018-07-31 PROCEDURE — 3331090001 HH PPS REVENUE CREDIT

## 2018-08-01 PROCEDURE — 3331090002 HH PPS REVENUE DEBIT

## 2018-08-01 PROCEDURE — 3331090001 HH PPS REVENUE CREDIT

## 2018-08-02 ENCOUNTER — HOME CARE VISIT (OUTPATIENT)
Dept: SCHEDULING | Facility: HOME HEALTH | Age: 43
End: 2018-08-02
Payer: MEDICARE

## 2018-08-02 VITALS
DIASTOLIC BLOOD PRESSURE: 57 MMHG | TEMPERATURE: 97.8 F | SYSTOLIC BLOOD PRESSURE: 91 MMHG | RESPIRATION RATE: 20 BRPM | HEART RATE: 106 BPM | OXYGEN SATURATION: 99 %

## 2018-08-02 PROCEDURE — 3331090002 HH PPS REVENUE DEBIT

## 2018-08-02 PROCEDURE — 3331090001 HH PPS REVENUE CREDIT

## 2018-08-02 PROCEDURE — A4452 WATERPROOF TAPE: HCPCS

## 2018-08-02 PROCEDURE — G0299 HHS/HOSPICE OF RN EA 15 MIN: HCPCS

## 2018-08-02 PROCEDURE — A4927 NON-STERILE GLOVES: HCPCS

## 2018-08-02 PROCEDURE — A4357 BEDSIDE DRAINAGE BAG: HCPCS

## 2018-08-02 PROCEDURE — A9270 NON-COVERED ITEM OR SERVICE: HCPCS

## 2018-08-02 PROCEDURE — A6216 NON-STERILE GAUZE<=16 SQ IN: HCPCS

## 2018-08-03 PROCEDURE — 3331090001 HH PPS REVENUE CREDIT

## 2018-08-03 PROCEDURE — 3331090002 HH PPS REVENUE DEBIT

## 2018-08-04 PROCEDURE — 3331090001 HH PPS REVENUE CREDIT

## 2018-08-04 PROCEDURE — 3331090002 HH PPS REVENUE DEBIT

## 2018-08-05 PROCEDURE — 3331090001 HH PPS REVENUE CREDIT

## 2018-08-05 PROCEDURE — 3331090002 HH PPS REVENUE DEBIT

## 2018-08-06 ENCOUNTER — HOME CARE VISIT (OUTPATIENT)
Dept: SCHEDULING | Facility: HOME HEALTH | Age: 43
End: 2018-08-06
Payer: MEDICARE

## 2018-08-06 VITALS
TEMPERATURE: 97.3 F | BODY MASS INDEX: 47.03 KG/M2 | WEIGHT: 274 LBS | OXYGEN SATURATION: 97 % | HEART RATE: 98 BPM | DIASTOLIC BLOOD PRESSURE: 60 MMHG | RESPIRATION RATE: 18 BRPM | SYSTOLIC BLOOD PRESSURE: 96 MMHG

## 2018-08-06 PROCEDURE — G0300 HHS/HOSPICE OF LPN EA 15 MIN: HCPCS

## 2018-08-06 PROCEDURE — 3331090002 HH PPS REVENUE DEBIT

## 2018-08-06 PROCEDURE — 3331090001 HH PPS REVENUE CREDIT

## 2018-08-07 PROCEDURE — 3331090002 HH PPS REVENUE DEBIT

## 2018-08-07 PROCEDURE — 3331090001 HH PPS REVENUE CREDIT

## 2018-08-08 PROCEDURE — 3331090001 HH PPS REVENUE CREDIT

## 2018-08-08 PROCEDURE — 3331090002 HH PPS REVENUE DEBIT

## 2018-08-09 ENCOUNTER — HOME CARE VISIT (OUTPATIENT)
Dept: SCHEDULING | Facility: HOME HEALTH | Age: 43
End: 2018-08-09
Payer: MEDICARE

## 2018-08-09 PROCEDURE — 3331090002 HH PPS REVENUE DEBIT

## 2018-08-09 PROCEDURE — 3331090001 HH PPS REVENUE CREDIT

## 2018-08-10 PROCEDURE — 3331090002 HH PPS REVENUE DEBIT

## 2018-08-10 PROCEDURE — 3331090001 HH PPS REVENUE CREDIT

## 2018-08-11 PROCEDURE — 3331090001 HH PPS REVENUE CREDIT

## 2018-08-11 PROCEDURE — 3331090002 HH PPS REVENUE DEBIT

## 2018-08-12 PROCEDURE — 3331090002 HH PPS REVENUE DEBIT

## 2018-08-12 PROCEDURE — 3331090001 HH PPS REVENUE CREDIT

## 2018-08-13 ENCOUNTER — HOME CARE VISIT (OUTPATIENT)
Dept: SCHEDULING | Facility: HOME HEALTH | Age: 43
End: 2018-08-13
Payer: MEDICARE

## 2018-08-13 VITALS
RESPIRATION RATE: 18 BRPM | HEART RATE: 95 BPM | WEIGHT: 271.8 LBS | BODY MASS INDEX: 46.65 KG/M2 | SYSTOLIC BLOOD PRESSURE: 132 MMHG | OXYGEN SATURATION: 99 % | DIASTOLIC BLOOD PRESSURE: 87 MMHG | TEMPERATURE: 97.9 F

## 2018-08-13 PROCEDURE — 3331090002 HH PPS REVENUE DEBIT

## 2018-08-13 PROCEDURE — 3331090001 HH PPS REVENUE CREDIT

## 2018-08-13 PROCEDURE — A4927 NON-STERILE GLOVES: HCPCS

## 2018-08-13 PROCEDURE — G0300 HHS/HOSPICE OF LPN EA 15 MIN: HCPCS

## 2018-08-13 PROCEDURE — A6216 NON-STERILE GAUZE<=16 SQ IN: HCPCS

## 2018-08-13 PROCEDURE — A4333 URINARY CATH ANCHOR DEVICE: HCPCS

## 2018-08-14 ENCOUNTER — OFFICE VISIT (OUTPATIENT)
Dept: SURGERY | Age: 43
End: 2018-08-14

## 2018-08-14 VITALS
HEART RATE: 106 BPM | RESPIRATION RATE: 16 BRPM | TEMPERATURE: 98.1 F | SYSTOLIC BLOOD PRESSURE: 120 MMHG | DIASTOLIC BLOOD PRESSURE: 78 MMHG | OXYGEN SATURATION: 98 % | BODY MASS INDEX: 46.69 KG/M2 | WEIGHT: 273.5 LBS | HEIGHT: 64 IN

## 2018-08-14 DIAGNOSIS — K63.2 SMALL BOWEL FISTULA: Primary | ICD-10-CM

## 2018-08-14 PROCEDURE — 3331090001 HH PPS REVENUE CREDIT

## 2018-08-14 PROCEDURE — 3331090002 HH PPS REVENUE DEBIT

## 2018-08-14 NOTE — PROGRESS NOTES
1. Have you been to the ER, urgent care clinic since your last visit? Hospitalized since your last visit? No    2. Have you seen or consulted any other health care providers outside of the [facility] since your last visit? Include any pap smears or colon screening.  Pain management - Dr. Bessy Julien

## 2018-08-15 PROCEDURE — 3331090001 HH PPS REVENUE CREDIT

## 2018-08-15 PROCEDURE — 3331090002 HH PPS REVENUE DEBIT

## 2018-08-16 ENCOUNTER — HOME CARE VISIT (OUTPATIENT)
Dept: SCHEDULING | Facility: HOME HEALTH | Age: 43
End: 2018-08-16
Payer: MEDICARE

## 2018-08-16 VITALS
HEART RATE: 98 BPM | OXYGEN SATURATION: 99 % | SYSTOLIC BLOOD PRESSURE: 98 MMHG | RESPIRATION RATE: 20 BRPM | TEMPERATURE: 97.5 F | DIASTOLIC BLOOD PRESSURE: 60 MMHG

## 2018-08-16 PROCEDURE — G0299 HHS/HOSPICE OF RN EA 15 MIN: HCPCS

## 2018-08-16 PROCEDURE — 3331090002 HH PPS REVENUE DEBIT

## 2018-08-16 PROCEDURE — 3331090001 HH PPS REVENUE CREDIT

## 2018-08-17 PROCEDURE — 3331090002 HH PPS REVENUE DEBIT

## 2018-08-17 PROCEDURE — 3331090001 HH PPS REVENUE CREDIT

## 2018-08-18 PROCEDURE — 3331090002 HH PPS REVENUE DEBIT

## 2018-08-18 PROCEDURE — 3331090001 HH PPS REVENUE CREDIT

## 2018-08-19 PROCEDURE — 3331090001 HH PPS REVENUE CREDIT

## 2018-08-19 PROCEDURE — 3331090002 HH PPS REVENUE DEBIT

## 2018-08-20 ENCOUNTER — HOME CARE VISIT (OUTPATIENT)
Dept: SCHEDULING | Facility: HOME HEALTH | Age: 43
End: 2018-08-20
Payer: MEDICARE

## 2018-08-20 VITALS
RESPIRATION RATE: 18 BRPM | SYSTOLIC BLOOD PRESSURE: 96 MMHG | TEMPERATURE: 97.1 F | BODY MASS INDEX: 47.79 KG/M2 | DIASTOLIC BLOOD PRESSURE: 60 MMHG | WEIGHT: 278.4 LBS | HEART RATE: 92 BPM | OXYGEN SATURATION: 98 %

## 2018-08-20 PROCEDURE — 3331090001 HH PPS REVENUE CREDIT

## 2018-08-20 PROCEDURE — 3331090002 HH PPS REVENUE DEBIT

## 2018-08-20 PROCEDURE — G0300 HHS/HOSPICE OF LPN EA 15 MIN: HCPCS

## 2018-08-21 PROCEDURE — 3331090001 HH PPS REVENUE CREDIT

## 2018-08-21 PROCEDURE — 3331090002 HH PPS REVENUE DEBIT

## 2018-08-22 PROCEDURE — 3331090002 HH PPS REVENUE DEBIT

## 2018-08-22 PROCEDURE — 3331090001 HH PPS REVENUE CREDIT

## 2018-08-23 ENCOUNTER — HOME CARE VISIT (OUTPATIENT)
Dept: SCHEDULING | Facility: HOME HEALTH | Age: 43
End: 2018-08-23
Payer: MEDICARE

## 2018-08-23 ENCOUNTER — TELEPHONE (OUTPATIENT)
Dept: SURGERY | Age: 43
End: 2018-08-23

## 2018-08-23 VITALS
SYSTOLIC BLOOD PRESSURE: 97 MMHG | HEART RATE: 106 BPM | OXYGEN SATURATION: 97 % | RESPIRATION RATE: 20 BRPM | DIASTOLIC BLOOD PRESSURE: 72 MMHG | TEMPERATURE: 98.3 F

## 2018-08-23 PROCEDURE — 3331090002 HH PPS REVENUE DEBIT

## 2018-08-23 PROCEDURE — 3331090001 HH PPS REVENUE CREDIT

## 2018-08-23 PROCEDURE — A4369 SKIN BARRIER LIQUID PER OZ: HCPCS

## 2018-08-23 PROCEDURE — A9270 NON-COVERED ITEM OR SERVICE: HCPCS

## 2018-08-23 PROCEDURE — G0299 HHS/HOSPICE OF RN EA 15 MIN: HCPCS

## 2018-08-23 PROCEDURE — A4452 WATERPROOF TAPE: HCPCS

## 2018-08-23 PROCEDURE — A5120 SKIN BARRIER, WIPE OR SWAB: HCPCS

## 2018-08-23 PROCEDURE — A4333 URINARY CATH ANCHOR DEVICE: HCPCS

## 2018-08-23 PROCEDURE — A6216 NON-STERILE GAUZE<=16 SQ IN: HCPCS

## 2018-08-24 ENCOUNTER — TELEPHONE (OUTPATIENT)
Dept: SURGERY | Age: 43
End: 2018-08-24

## 2018-08-24 PROCEDURE — 3331090002 HH PPS REVENUE DEBIT

## 2018-08-24 PROCEDURE — 3331090001 HH PPS REVENUE CREDIT

## 2018-08-24 NOTE — TELEPHONE ENCOUNTER
Spoke with Tony carbone with [facility] specialty pharmacy regarding script faxed on 8/3/18 for Gattex. She states script was completed, signed and faxed correctly, and they are not missing any information. She is requesting a verbal order to change Quantity of medication from 30 to 60 because script is written for 6.265 mg/day and the vials only come in 3.8 mg so patient will need 2 vials/day or 60 in quantity for the month. Discussed with JODI Hayes order given. She will call if any other questions or concerns.

## 2018-08-25 PROCEDURE — 3331090001 HH PPS REVENUE CREDIT

## 2018-08-25 PROCEDURE — 3331090002 HH PPS REVENUE DEBIT

## 2018-08-26 PROCEDURE — 3331090002 HH PPS REVENUE DEBIT

## 2018-08-26 PROCEDURE — 3331090001 HH PPS REVENUE CREDIT

## 2018-08-27 ENCOUNTER — TELEPHONE (OUTPATIENT)
Dept: SURGERY | Age: 43
End: 2018-08-27

## 2018-08-27 ENCOUNTER — HOME CARE VISIT (OUTPATIENT)
Dept: SCHEDULING | Facility: HOME HEALTH | Age: 43
End: 2018-08-27
Payer: MEDICARE

## 2018-08-27 VITALS
DIASTOLIC BLOOD PRESSURE: 64 MMHG | WEIGHT: 282.4 LBS | BODY MASS INDEX: 48.47 KG/M2 | OXYGEN SATURATION: 96 % | TEMPERATURE: 97.5 F | SYSTOLIC BLOOD PRESSURE: 98 MMHG | RESPIRATION RATE: 18 BRPM | HEART RATE: 99 BPM

## 2018-08-27 PROCEDURE — G0300 HHS/HOSPICE OF LPN EA 15 MIN: HCPCS

## 2018-08-27 PROCEDURE — 3331090001 HH PPS REVENUE CREDIT

## 2018-08-27 PROCEDURE — 3331090002 HH PPS REVENUE DEBIT

## 2018-08-28 ENCOUNTER — TELEPHONE (OUTPATIENT)
Dept: SURGERY | Age: 43
End: 2018-08-28

## 2018-08-28 PROCEDURE — 3331090002 HH PPS REVENUE DEBIT

## 2018-08-28 PROCEDURE — 3331090001 HH PPS REVENUE CREDIT

## 2018-08-28 NOTE — TELEPHONE ENCOUNTER
I returned phone call from [place] with Lindsey Hung- Re: product Gattex medication prior auth. [phone]. She is requesting I call Jolene Wrihgt [phone] for prior auth. I called and spoke with Nelida Winters (Timur Jennings?) who got Georges, pharmacist, on the phone with us. Approval was given from 12/30/17 ending 12/31/18 confirmation #[id number]. [place] was given this information. Faxed approval from Jolene Wright was scanned into system.

## 2018-08-29 PROCEDURE — 3331090001 HH PPS REVENUE CREDIT

## 2018-08-29 PROCEDURE — 3331090002 HH PPS REVENUE DEBIT

## 2018-08-30 ENCOUNTER — HOME CARE VISIT (OUTPATIENT)
Dept: SCHEDULING | Facility: HOME HEALTH | Age: 43
End: 2018-08-30
Payer: MEDICARE

## 2018-08-30 VITALS
TEMPERATURE: 98.3 F | SYSTOLIC BLOOD PRESSURE: 98 MMHG | RESPIRATION RATE: 18 BRPM | HEART RATE: 91 BPM | OXYGEN SATURATION: 99 % | DIASTOLIC BLOOD PRESSURE: 60 MMHG

## 2018-08-30 PROCEDURE — 3331090001 HH PPS REVENUE CREDIT

## 2018-08-30 PROCEDURE — 3331090002 HH PPS REVENUE DEBIT

## 2018-08-30 PROCEDURE — G0299 HHS/HOSPICE OF RN EA 15 MIN: HCPCS

## 2018-08-30 PROCEDURE — A6216 NON-STERILE GAUZE<=16 SQ IN: HCPCS

## 2018-08-30 PROCEDURE — A4333 URINARY CATH ANCHOR DEVICE: HCPCS

## 2018-08-31 PROCEDURE — 3331090002 HH PPS REVENUE DEBIT

## 2018-08-31 PROCEDURE — 3331090001 HH PPS REVENUE CREDIT

## 2018-09-01 PROCEDURE — 3331090001 HH PPS REVENUE CREDIT

## 2018-09-01 PROCEDURE — 3331090002 HH PPS REVENUE DEBIT

## 2018-09-02 PROCEDURE — 3331090002 HH PPS REVENUE DEBIT

## 2018-09-02 PROCEDURE — 3331090001 HH PPS REVENUE CREDIT

## 2018-09-03 ENCOUNTER — HOME CARE VISIT (OUTPATIENT)
Dept: SCHEDULING | Facility: HOME HEALTH | Age: 43
End: 2018-09-03
Payer: MEDICARE

## 2018-09-03 VITALS
HEART RATE: 88 BPM | DIASTOLIC BLOOD PRESSURE: 64 MMHG | RESPIRATION RATE: 20 BRPM | TEMPERATURE: 98.4 F | OXYGEN SATURATION: 98 % | SYSTOLIC BLOOD PRESSURE: 112 MMHG

## 2018-09-03 PROCEDURE — 3331090001 HH PPS REVENUE CREDIT

## 2018-09-03 PROCEDURE — G0299 HHS/HOSPICE OF RN EA 15 MIN: HCPCS

## 2018-09-03 PROCEDURE — 3331090002 HH PPS REVENUE DEBIT

## 2018-09-04 ENCOUNTER — TELEPHONE (OUTPATIENT)
Dept: SURGERY | Age: 43
End: 2018-09-04

## 2018-09-04 PROCEDURE — 3331090002 HH PPS REVENUE DEBIT

## 2018-09-04 PROCEDURE — 3331090001 HH PPS REVENUE CREDIT

## 2018-09-04 NOTE — TELEPHONE ENCOUNTER
Beverly Corona says she has been working with you regarding this patient. . She says the patient was approved for the drug Gattex but they are unable to reach the patient to set-up delivery. . They have tried several numbers and have called & texted her for over a week now. China Wing wanted to speak with you to make you aware of this and to also see if the patient has been admitted into the hospital this past week or anything. Dex Duran

## 2018-09-04 NOTE — TELEPHONE ENCOUNTER
I returned Damien Stevenson 92 phone call and she said she has texted and called patient and has not gotten a response. She states the drug Dub Concho has been approved and now she needs to do a home study and the patient has not responded to her calls. She asked that I call patient. I left a message on [phone] stating that Bird Larson is trying to reach her and if she has decieded to not try the Gattex to let us or   Bird Larson know.

## 2018-09-05 PROCEDURE — 3331090002 HH PPS REVENUE DEBIT

## 2018-09-05 PROCEDURE — 3331090001 HH PPS REVENUE CREDIT

## 2018-09-06 ENCOUNTER — HOME CARE VISIT (OUTPATIENT)
Dept: SCHEDULING | Facility: HOME HEALTH | Age: 43
End: 2018-09-06
Payer: MEDICARE

## 2018-09-06 PROCEDURE — 3331090001 HH PPS REVENUE CREDIT

## 2018-09-06 PROCEDURE — 3331090002 HH PPS REVENUE DEBIT

## 2018-09-07 PROCEDURE — 3331090002 HH PPS REVENUE DEBIT

## 2018-09-07 PROCEDURE — 3331090001 HH PPS REVENUE CREDIT

## 2018-09-08 PROCEDURE — 3331090002 HH PPS REVENUE DEBIT

## 2018-09-08 PROCEDURE — 3331090001 HH PPS REVENUE CREDIT

## 2018-09-09 PROCEDURE — 3331090001 HH PPS REVENUE CREDIT

## 2018-09-09 PROCEDURE — 3331090002 HH PPS REVENUE DEBIT

## 2018-09-10 ENCOUNTER — HOME CARE VISIT (OUTPATIENT)
Dept: SCHEDULING | Facility: HOME HEALTH | Age: 43
End: 2018-09-10
Payer: MEDICARE

## 2018-09-10 ENCOUNTER — HOME CARE VISIT (OUTPATIENT)
Dept: HOME HEALTH SERVICES | Facility: HOME HEALTH | Age: 43
End: 2018-09-10
Payer: MEDICARE

## 2018-09-10 VITALS
HEART RATE: 93 BPM | SYSTOLIC BLOOD PRESSURE: 108 MMHG | OXYGEN SATURATION: 98 % | TEMPERATURE: 97.8 F | RESPIRATION RATE: 18 BRPM | DIASTOLIC BLOOD PRESSURE: 68 MMHG

## 2018-09-10 PROCEDURE — G0300 HHS/HOSPICE OF LPN EA 15 MIN: HCPCS

## 2018-09-10 PROCEDURE — 3331090002 HH PPS REVENUE DEBIT

## 2018-09-10 PROCEDURE — 3331090001 HH PPS REVENUE CREDIT

## 2018-09-11 PROCEDURE — 3331090002 HH PPS REVENUE DEBIT

## 2018-09-11 PROCEDURE — 3331090001 HH PPS REVENUE CREDIT

## 2018-09-12 PROCEDURE — 3331090002 HH PPS REVENUE DEBIT

## 2018-09-12 PROCEDURE — 3331090001 HH PPS REVENUE CREDIT

## 2018-09-13 ENCOUNTER — HOME CARE VISIT (OUTPATIENT)
Dept: SCHEDULING | Facility: HOME HEALTH | Age: 43
End: 2018-09-13
Payer: MEDICARE

## 2018-09-13 VITALS
SYSTOLIC BLOOD PRESSURE: 112 MMHG | RESPIRATION RATE: 18 BRPM | DIASTOLIC BLOOD PRESSURE: 80 MMHG | HEART RATE: 101 BPM | OXYGEN SATURATION: 97 % | TEMPERATURE: 98 F

## 2018-09-13 PROCEDURE — 3331090001 HH PPS REVENUE CREDIT

## 2018-09-13 PROCEDURE — G0299 HHS/HOSPICE OF RN EA 15 MIN: HCPCS

## 2018-09-13 PROCEDURE — 3331090002 HH PPS REVENUE DEBIT

## 2018-09-14 PROCEDURE — 3331090002 HH PPS REVENUE DEBIT

## 2018-09-14 PROCEDURE — 3331090001 HH PPS REVENUE CREDIT

## 2018-09-15 PROCEDURE — 3331090001 HH PPS REVENUE CREDIT

## 2018-09-15 PROCEDURE — 3331090002 HH PPS REVENUE DEBIT

## 2018-09-16 PROCEDURE — 3331090001 HH PPS REVENUE CREDIT

## 2018-09-16 PROCEDURE — 3331090002 HH PPS REVENUE DEBIT

## 2018-09-17 ENCOUNTER — HOME CARE VISIT (OUTPATIENT)
Dept: SCHEDULING | Facility: HOME HEALTH | Age: 43
End: 2018-09-17
Payer: MEDICARE

## 2018-09-17 VITALS
DIASTOLIC BLOOD PRESSURE: 60 MMHG | OXYGEN SATURATION: 99 % | HEART RATE: 90 BPM | BODY MASS INDEX: 48.37 KG/M2 | SYSTOLIC BLOOD PRESSURE: 96 MMHG | WEIGHT: 281.8 LBS | TEMPERATURE: 97.3 F | RESPIRATION RATE: 18 BRPM

## 2018-09-17 PROCEDURE — 3331090001 HH PPS REVENUE CREDIT

## 2018-09-17 PROCEDURE — G0300 HHS/HOSPICE OF LPN EA 15 MIN: HCPCS

## 2018-09-17 PROCEDURE — 3331090002 HH PPS REVENUE DEBIT

## 2018-09-18 PROCEDURE — 3331090002 HH PPS REVENUE DEBIT

## 2018-09-18 PROCEDURE — 3331090001 HH PPS REVENUE CREDIT

## 2018-09-19 PROCEDURE — 3331090001 HH PPS REVENUE CREDIT

## 2018-09-19 PROCEDURE — 3331090002 HH PPS REVENUE DEBIT

## 2018-09-20 ENCOUNTER — HOME CARE VISIT (OUTPATIENT)
Dept: SCHEDULING | Facility: HOME HEALTH | Age: 43
End: 2018-09-20
Payer: MEDICARE

## 2018-09-20 VITALS
RESPIRATION RATE: 18 BRPM | DIASTOLIC BLOOD PRESSURE: 63 MMHG | HEART RATE: 82 BPM | OXYGEN SATURATION: 100 % | TEMPERATURE: 98.1 F | SYSTOLIC BLOOD PRESSURE: 98 MMHG

## 2018-09-20 PROCEDURE — A4452 WATERPROOF TAPE: HCPCS

## 2018-09-20 PROCEDURE — 3331090001 HH PPS REVENUE CREDIT

## 2018-09-20 PROCEDURE — G0299 HHS/HOSPICE OF RN EA 15 MIN: HCPCS

## 2018-09-20 PROCEDURE — A5120 SKIN BARRIER, WIPE OR SWAB: HCPCS

## 2018-09-20 PROCEDURE — 3331090002 HH PPS REVENUE DEBIT

## 2018-09-20 PROCEDURE — A4357 BEDSIDE DRAINAGE BAG: HCPCS

## 2018-09-20 PROCEDURE — A4333 URINARY CATH ANCHOR DEVICE: HCPCS

## 2018-09-20 PROCEDURE — A6216 NON-STERILE GAUZE<=16 SQ IN: HCPCS

## 2018-09-20 PROCEDURE — A4456 ADHESIVE REMOVER, WIPES: HCPCS

## 2018-09-21 ENCOUNTER — TELEPHONE (OUTPATIENT)
Dept: SURGERY | Age: 43
End: 2018-09-21

## 2018-09-21 PROCEDURE — 400014 HH F/U

## 2018-09-21 PROCEDURE — 3331090002 HH PPS REVENUE DEBIT

## 2018-09-21 PROCEDURE — 3331090001 HH PPS REVENUE CREDIT

## 2018-09-21 NOTE — TELEPHONE ENCOUNTER
Patient called and wanted to leave a message for Dr. Magdalene Gee. She states she had to reschedule again for her small bowel series.  She says she's scheduled for 10/5/18 at 8:30 am.

## 2018-09-22 PROCEDURE — 3331090002 HH PPS REVENUE DEBIT

## 2018-09-22 PROCEDURE — 3331090001 HH PPS REVENUE CREDIT

## 2018-09-23 PROCEDURE — 3331090002 HH PPS REVENUE DEBIT

## 2018-09-23 PROCEDURE — 3331090001 HH PPS REVENUE CREDIT

## 2018-09-24 ENCOUNTER — HOME CARE VISIT (OUTPATIENT)
Dept: HOME HEALTH SERVICES | Facility: HOME HEALTH | Age: 43
End: 2018-09-24
Payer: MEDICARE

## 2018-09-24 PROCEDURE — 3331090002 HH PPS REVENUE DEBIT

## 2018-09-24 PROCEDURE — 3331090001 HH PPS REVENUE CREDIT

## 2018-09-24 PROCEDURE — 400014 HH F/U

## 2018-09-24 PROCEDURE — G0299 HHS/HOSPICE OF RN EA 15 MIN: HCPCS

## 2018-09-25 PROCEDURE — 3331090002 HH PPS REVENUE DEBIT

## 2018-09-25 PROCEDURE — 3331090001 HH PPS REVENUE CREDIT

## 2018-09-26 PROCEDURE — 3331090001 HH PPS REVENUE CREDIT

## 2018-09-26 PROCEDURE — 3331090002 HH PPS REVENUE DEBIT

## 2018-09-26 NOTE — PROGRESS NOTES
Reason for Visit:  Follow-up open wound and nutritional status    Brief History:  39 yo woman with hx Crohn's disease s/p protocolectomy with end ileostomy a number of years back who underwent balloon enteroscopy for pill camera capsule retrieval who then developed perforation require emergency operation and bowel resection. Since operation her course have been complicated by EC fistula and short gut syndrome requiring TPN. Pt presented to clinic for EC fistula wound check and to discuss Gattex injection to stimulate intestinal absorption. PT overall has been doing better. Strength is returning and she is losing weight. Wound is controlled with abdominal wound management system. She is slowly being weaned from her pain medication. Visit Vitals    /78 (BP 1 Location: Left arm, BP Patient Position: Sitting)    Pulse (!) 106    Temp 98.1 °F (36.7 °C) (Oral)    Resp 16    Ht 5' 4\" (1.626 m)    Wt 273 lb 8 oz (124.1 kg)    SpO2 98%    BMI 46.95 kg/m2         Physical Exam:    Gen:  NAD  Pulm:  Unlabored  Abd:  S/ND/appropriate TTP  Midline wound with large rosa bud fistula. AP:  39 yo woman with Crohn's disease and EC fistula    - EC fistula:  Continue wound care with abdominal wound pouching system  - Short gut syndrome:  Pt is currently unable to take in adequate oral nutrition due to proximal EC fistula with high output. Continue TPN for now.   Discussed with patient about trial of Gattex which is a daily injection that helps promote growth of intestinal epithelial cells and increase intestinal absorption.  - Upper Gi study in a month to evaluate for length of gut remaining

## 2018-09-27 ENCOUNTER — HOME CARE VISIT (OUTPATIENT)
Dept: HOME HEALTH SERVICES | Facility: HOME HEALTH | Age: 43
End: 2018-09-27
Payer: MEDICARE

## 2018-09-27 PROCEDURE — 3331090001 HH PPS REVENUE CREDIT

## 2018-09-27 PROCEDURE — 3331090002 HH PPS REVENUE DEBIT

## 2018-09-28 PROCEDURE — 3331090002 HH PPS REVENUE DEBIT

## 2018-09-28 PROCEDURE — 3331090001 HH PPS REVENUE CREDIT

## 2018-09-29 PROCEDURE — 3331090002 HH PPS REVENUE DEBIT

## 2018-09-29 PROCEDURE — 3331090001 HH PPS REVENUE CREDIT

## 2018-09-30 PROCEDURE — 3331090001 HH PPS REVENUE CREDIT

## 2018-09-30 PROCEDURE — 3331090002 HH PPS REVENUE DEBIT

## 2018-10-01 ENCOUNTER — HOME CARE VISIT (OUTPATIENT)
Dept: SCHEDULING | Facility: HOME HEALTH | Age: 43
End: 2018-10-01
Payer: MEDICARE

## 2018-10-01 PROCEDURE — 3331090001 HH PPS REVENUE CREDIT

## 2018-10-01 PROCEDURE — G0300 HHS/HOSPICE OF LPN EA 15 MIN: HCPCS

## 2018-10-01 PROCEDURE — 400014 HH F/U

## 2018-10-01 PROCEDURE — 3331090002 HH PPS REVENUE DEBIT

## 2018-10-02 VITALS
OXYGEN SATURATION: 98 % | WEIGHT: 284.6 LBS | SYSTOLIC BLOOD PRESSURE: 100 MMHG | DIASTOLIC BLOOD PRESSURE: 64 MMHG | HEART RATE: 83 BPM | RESPIRATION RATE: 18 BRPM | BODY MASS INDEX: 48.85 KG/M2 | TEMPERATURE: 97 F

## 2018-10-02 PROCEDURE — 3331090002 HH PPS REVENUE DEBIT

## 2018-10-02 PROCEDURE — 3331090001 HH PPS REVENUE CREDIT

## 2018-10-03 PROCEDURE — 3331090002 HH PPS REVENUE DEBIT

## 2018-10-03 PROCEDURE — 3331090001 HH PPS REVENUE CREDIT

## 2018-10-04 ENCOUNTER — HOME CARE VISIT (OUTPATIENT)
Dept: HOME HEALTH SERVICES | Facility: HOME HEALTH | Age: 43
End: 2018-10-04
Payer: MEDICARE

## 2018-10-04 VITALS
OXYGEN SATURATION: 97 % | DIASTOLIC BLOOD PRESSURE: 74 MMHG | HEART RATE: 86 BPM | RESPIRATION RATE: 18 BRPM | SYSTOLIC BLOOD PRESSURE: 105 MMHG | TEMPERATURE: 98.6 F

## 2018-10-04 PROCEDURE — 3331090002 HH PPS REVENUE DEBIT

## 2018-10-04 PROCEDURE — 3331090001 HH PPS REVENUE CREDIT

## 2018-10-05 PROCEDURE — 3331090001 HH PPS REVENUE CREDIT

## 2018-10-05 PROCEDURE — 3331090002 HH PPS REVENUE DEBIT

## 2018-10-06 PROCEDURE — 3331090002 HH PPS REVENUE DEBIT

## 2018-10-06 PROCEDURE — 3331090001 HH PPS REVENUE CREDIT

## 2018-10-07 PROCEDURE — 3331090001 HH PPS REVENUE CREDIT

## 2018-10-07 PROCEDURE — 3331090002 HH PPS REVENUE DEBIT

## 2018-10-08 ENCOUNTER — HOME CARE VISIT (OUTPATIENT)
Dept: SCHEDULING | Facility: HOME HEALTH | Age: 43
End: 2018-10-08
Payer: MEDICARE

## 2018-10-08 VITALS
TEMPERATURE: 97.8 F | OXYGEN SATURATION: 99 % | DIASTOLIC BLOOD PRESSURE: 72 MMHG | BODY MASS INDEX: 47.38 KG/M2 | HEART RATE: 104 BPM | WEIGHT: 276 LBS | RESPIRATION RATE: 18 BRPM | SYSTOLIC BLOOD PRESSURE: 108 MMHG

## 2018-10-08 PROCEDURE — 3331090002 HH PPS REVENUE DEBIT

## 2018-10-08 PROCEDURE — G0300 HHS/HOSPICE OF LPN EA 15 MIN: HCPCS

## 2018-10-08 PROCEDURE — 3331090001 HH PPS REVENUE CREDIT

## 2018-10-09 PROCEDURE — 3331090002 HH PPS REVENUE DEBIT

## 2018-10-09 PROCEDURE — 3331090001 HH PPS REVENUE CREDIT

## 2018-10-10 PROCEDURE — 3331090002 HH PPS REVENUE DEBIT

## 2018-10-10 PROCEDURE — 3331090001 HH PPS REVENUE CREDIT

## 2018-10-11 ENCOUNTER — HOME CARE VISIT (OUTPATIENT)
Dept: SCHEDULING | Facility: HOME HEALTH | Age: 43
End: 2018-10-11
Payer: MEDICARE

## 2018-10-11 VITALS
HEART RATE: 94 BPM | TEMPERATURE: 98 F | RESPIRATION RATE: 18 BRPM | OXYGEN SATURATION: 100 % | DIASTOLIC BLOOD PRESSURE: 80 MMHG | SYSTOLIC BLOOD PRESSURE: 110 MMHG

## 2018-10-11 PROCEDURE — A4333 URINARY CATH ANCHOR DEVICE: HCPCS

## 2018-10-11 PROCEDURE — A6216 NON-STERILE GAUZE<=16 SQ IN: HCPCS

## 2018-10-11 PROCEDURE — A4357 BEDSIDE DRAINAGE BAG: HCPCS

## 2018-10-11 PROCEDURE — 3331090001 HH PPS REVENUE CREDIT

## 2018-10-11 PROCEDURE — A4409 OST SKN BARR CONVEX <=4 SQ I: HCPCS

## 2018-10-11 PROCEDURE — G0299 HHS/HOSPICE OF RN EA 15 MIN: HCPCS

## 2018-10-11 PROCEDURE — A4425 OST PCH DRAIN FOR BARRIER FL: HCPCS

## 2018-10-11 PROCEDURE — 3331090002 HH PPS REVENUE DEBIT

## 2018-10-12 PROCEDURE — 3331090001 HH PPS REVENUE CREDIT

## 2018-10-12 PROCEDURE — 3331090002 HH PPS REVENUE DEBIT

## 2018-10-13 PROCEDURE — 3331090001 HH PPS REVENUE CREDIT

## 2018-10-13 PROCEDURE — 3331090002 HH PPS REVENUE DEBIT

## 2018-10-14 PROCEDURE — 3331090001 HH PPS REVENUE CREDIT

## 2018-10-14 PROCEDURE — 3331090002 HH PPS REVENUE DEBIT

## 2018-10-15 ENCOUNTER — HOME CARE VISIT (OUTPATIENT)
Dept: SCHEDULING | Facility: HOME HEALTH | Age: 43
End: 2018-10-15
Payer: MEDICARE

## 2018-10-15 VITALS
OXYGEN SATURATION: 98 % | TEMPERATURE: 97.9 F | RESPIRATION RATE: 18 BRPM | HEART RATE: 93 BPM | DIASTOLIC BLOOD PRESSURE: 70 MMHG | SYSTOLIC BLOOD PRESSURE: 103 MMHG | BODY MASS INDEX: 47 KG/M2 | WEIGHT: 273.8 LBS

## 2018-10-15 PROCEDURE — 3331090002 HH PPS REVENUE DEBIT

## 2018-10-15 PROCEDURE — G0300 HHS/HOSPICE OF LPN EA 15 MIN: HCPCS

## 2018-10-15 PROCEDURE — 3331090001 HH PPS REVENUE CREDIT

## 2018-10-16 ENCOUNTER — TELEPHONE (OUTPATIENT)
Dept: SURGERY | Age: 43
End: 2018-10-16

## 2018-10-16 PROCEDURE — 3331090002 HH PPS REVENUE DEBIT

## 2018-10-16 PROCEDURE — 3331090001 HH PPS REVENUE CREDIT

## 2018-10-16 NOTE — TELEPHONE ENCOUNTER
Germania Hill From [phone] wants to know if Dr. Lennie Burrows wants  to continue the patient on TPN? I texted Dr. Lennie Burrows and he said patient will need to continue TPN.

## 2018-10-17 PROCEDURE — 3331090002 HH PPS REVENUE DEBIT

## 2018-10-17 PROCEDURE — 3331090001 HH PPS REVENUE CREDIT

## 2018-10-18 ENCOUNTER — HOME CARE VISIT (OUTPATIENT)
Dept: SCHEDULING | Facility: HOME HEALTH | Age: 43
End: 2018-10-18
Payer: MEDICARE

## 2018-10-18 VITALS
HEART RATE: 79 BPM | SYSTOLIC BLOOD PRESSURE: 108 MMHG | OXYGEN SATURATION: 100 % | TEMPERATURE: 98 F | DIASTOLIC BLOOD PRESSURE: 71 MMHG | RESPIRATION RATE: 18 BRPM

## 2018-10-18 PROCEDURE — G0299 HHS/HOSPICE OF RN EA 15 MIN: HCPCS

## 2018-10-18 PROCEDURE — 3331090002 HH PPS REVENUE DEBIT

## 2018-10-18 PROCEDURE — 3331090001 HH PPS REVENUE CREDIT

## 2018-10-19 PROCEDURE — 3331090002 HH PPS REVENUE DEBIT

## 2018-10-19 PROCEDURE — 3331090001 HH PPS REVENUE CREDIT

## 2018-10-20 PROCEDURE — 3331090002 HH PPS REVENUE DEBIT

## 2018-10-20 PROCEDURE — 3331090001 HH PPS REVENUE CREDIT

## 2018-10-21 PROCEDURE — 3331090001 HH PPS REVENUE CREDIT

## 2018-10-21 PROCEDURE — 3331090002 HH PPS REVENUE DEBIT

## 2018-10-22 ENCOUNTER — HOME CARE VISIT (OUTPATIENT)
Dept: SCHEDULING | Facility: HOME HEALTH | Age: 43
End: 2018-10-22
Payer: MEDICARE

## 2018-10-22 VITALS
BODY MASS INDEX: 47.72 KG/M2 | SYSTOLIC BLOOD PRESSURE: 110 MMHG | HEART RATE: 101 BPM | TEMPERATURE: 97.5 F | OXYGEN SATURATION: 99 % | DIASTOLIC BLOOD PRESSURE: 74 MMHG | WEIGHT: 278 LBS | RESPIRATION RATE: 18 BRPM

## 2018-10-22 PROCEDURE — 3331090002 HH PPS REVENUE DEBIT

## 2018-10-22 PROCEDURE — G0300 HHS/HOSPICE OF LPN EA 15 MIN: HCPCS

## 2018-10-22 PROCEDURE — 3331090001 HH PPS REVENUE CREDIT

## 2018-10-23 PROCEDURE — 3331090001 HH PPS REVENUE CREDIT

## 2018-10-23 PROCEDURE — 3331090002 HH PPS REVENUE DEBIT

## 2018-10-24 PROCEDURE — 3331090002 HH PPS REVENUE DEBIT

## 2018-10-24 PROCEDURE — 3331090001 HH PPS REVENUE CREDIT

## 2018-10-25 ENCOUNTER — HOME CARE VISIT (OUTPATIENT)
Dept: SCHEDULING | Facility: HOME HEALTH | Age: 43
End: 2018-10-25
Payer: MEDICARE

## 2018-10-25 VITALS
HEART RATE: 78 BPM | TEMPERATURE: 97.9 F | RESPIRATION RATE: 18 BRPM | DIASTOLIC BLOOD PRESSURE: 76 MMHG | SYSTOLIC BLOOD PRESSURE: 108 MMHG | OXYGEN SATURATION: 100 %

## 2018-10-25 PROCEDURE — 3331090001 HH PPS REVENUE CREDIT

## 2018-10-25 PROCEDURE — 3331090002 HH PPS REVENUE DEBIT

## 2018-10-25 PROCEDURE — G0299 HHS/HOSPICE OF RN EA 15 MIN: HCPCS

## 2018-10-26 PROCEDURE — 3331090001 HH PPS REVENUE CREDIT

## 2018-10-26 PROCEDURE — A4333 URINARY CATH ANCHOR DEVICE: HCPCS

## 2018-10-26 PROCEDURE — A4357 BEDSIDE DRAINAGE BAG: HCPCS

## 2018-10-26 PROCEDURE — 3331090002 HH PPS REVENUE DEBIT

## 2018-10-26 PROCEDURE — A6216 NON-STERILE GAUZE<=16 SQ IN: HCPCS

## 2018-10-27 PROCEDURE — 3331090001 HH PPS REVENUE CREDIT

## 2018-10-27 PROCEDURE — 3331090002 HH PPS REVENUE DEBIT

## 2018-10-28 PROCEDURE — 3331090002 HH PPS REVENUE DEBIT

## 2018-10-28 PROCEDURE — 3331090001 HH PPS REVENUE CREDIT

## 2018-10-29 ENCOUNTER — HOME CARE VISIT (OUTPATIENT)
Dept: SCHEDULING | Facility: HOME HEALTH | Age: 43
End: 2018-10-29
Payer: MEDICARE

## 2018-10-29 VITALS
OXYGEN SATURATION: 98 % | WEIGHT: 175 LBS | RESPIRATION RATE: 18 BRPM | DIASTOLIC BLOOD PRESSURE: 70 MMHG | SYSTOLIC BLOOD PRESSURE: 114 MMHG | TEMPERATURE: 98.3 F | BODY MASS INDEX: 30.04 KG/M2 | HEART RATE: 83 BPM

## 2018-10-29 PROCEDURE — G0300 HHS/HOSPICE OF LPN EA 15 MIN: HCPCS

## 2018-10-29 PROCEDURE — A4452 WATERPROOF TAPE: HCPCS

## 2018-10-29 PROCEDURE — 3331090002 HH PPS REVENUE DEBIT

## 2018-10-29 PROCEDURE — 3331090001 HH PPS REVENUE CREDIT

## 2018-10-30 PROCEDURE — 3331090001 HH PPS REVENUE CREDIT

## 2018-10-30 PROCEDURE — 3331090002 HH PPS REVENUE DEBIT

## 2018-10-31 PROCEDURE — 3331090002 HH PPS REVENUE DEBIT

## 2018-10-31 PROCEDURE — 3331090001 HH PPS REVENUE CREDIT

## 2018-11-01 ENCOUNTER — HOME CARE VISIT (OUTPATIENT)
Dept: SCHEDULING | Facility: HOME HEALTH | Age: 43
End: 2018-11-01
Payer: MEDICARE

## 2018-11-01 PROCEDURE — 3331090002 HH PPS REVENUE DEBIT

## 2018-11-01 PROCEDURE — 3331090001 HH PPS REVENUE CREDIT

## 2018-11-02 PROCEDURE — 3331090001 HH PPS REVENUE CREDIT

## 2018-11-02 PROCEDURE — 3331090002 HH PPS REVENUE DEBIT

## 2018-11-03 PROCEDURE — 3331090002 HH PPS REVENUE DEBIT

## 2018-11-03 PROCEDURE — 3331090001 HH PPS REVENUE CREDIT

## 2018-11-04 PROCEDURE — 3331090001 HH PPS REVENUE CREDIT

## 2018-11-04 PROCEDURE — 3331090002 HH PPS REVENUE DEBIT

## 2018-11-05 ENCOUNTER — HOME CARE VISIT (OUTPATIENT)
Dept: SCHEDULING | Facility: HOME HEALTH | Age: 43
End: 2018-11-05
Payer: MEDICARE

## 2018-11-05 VITALS
DIASTOLIC BLOOD PRESSURE: 64 MMHG | OXYGEN SATURATION: 98 % | SYSTOLIC BLOOD PRESSURE: 104 MMHG | RESPIRATION RATE: 18 BRPM | TEMPERATURE: 97.9 F | WEIGHT: 271.5 LBS | HEART RATE: 88 BPM | BODY MASS INDEX: 46.6 KG/M2

## 2018-11-05 PROCEDURE — 3331090002 HH PPS REVENUE DEBIT

## 2018-11-05 PROCEDURE — 3331090001 HH PPS REVENUE CREDIT

## 2018-11-05 PROCEDURE — G0300 HHS/HOSPICE OF LPN EA 15 MIN: HCPCS

## 2018-11-06 PROCEDURE — 3331090001 HH PPS REVENUE CREDIT

## 2018-11-06 PROCEDURE — 3331090002 HH PPS REVENUE DEBIT

## 2018-11-07 PROCEDURE — 3331090001 HH PPS REVENUE CREDIT

## 2018-11-07 PROCEDURE — 3331090002 HH PPS REVENUE DEBIT

## 2018-11-08 ENCOUNTER — HOME CARE VISIT (OUTPATIENT)
Dept: SCHEDULING | Facility: HOME HEALTH | Age: 43
End: 2018-11-08
Payer: MEDICARE

## 2018-11-08 VITALS
RESPIRATION RATE: 18 BRPM | TEMPERATURE: 98.2 F | SYSTOLIC BLOOD PRESSURE: 107 MMHG | HEART RATE: 90 BPM | OXYGEN SATURATION: 98 % | DIASTOLIC BLOOD PRESSURE: 70 MMHG

## 2018-11-08 PROCEDURE — A4425 OST PCH DRAIN FOR BARRIER FL: HCPCS

## 2018-11-08 PROCEDURE — 3331090002 HH PPS REVENUE DEBIT

## 2018-11-08 PROCEDURE — 3331090001 HH PPS REVENUE CREDIT

## 2018-11-08 PROCEDURE — G0299 HHS/HOSPICE OF RN EA 15 MIN: HCPCS

## 2018-11-08 PROCEDURE — A4406 PECTIN BASED OSTOMY PASTE: HCPCS

## 2018-11-08 PROCEDURE — A4357 BEDSIDE DRAINAGE BAG: HCPCS

## 2018-11-08 PROCEDURE — A6216 NON-STERILE GAUZE<=16 SQ IN: HCPCS

## 2018-11-09 PROCEDURE — 3331090002 HH PPS REVENUE DEBIT

## 2018-11-09 PROCEDURE — 3331090001 HH PPS REVENUE CREDIT

## 2018-11-10 PROCEDURE — 3331090001 HH PPS REVENUE CREDIT

## 2018-11-10 PROCEDURE — 3331090002 HH PPS REVENUE DEBIT

## 2018-11-11 PROCEDURE — 3331090002 HH PPS REVENUE DEBIT

## 2018-11-11 PROCEDURE — 3331090001 HH PPS REVENUE CREDIT

## 2018-11-12 ENCOUNTER — HOME CARE VISIT (OUTPATIENT)
Dept: SCHEDULING | Facility: HOME HEALTH | Age: 43
End: 2018-11-12
Payer: MEDICARE

## 2018-11-12 PROCEDURE — G0300 HHS/HOSPICE OF LPN EA 15 MIN: HCPCS

## 2018-11-12 PROCEDURE — 3331090002 HH PPS REVENUE DEBIT

## 2018-11-12 PROCEDURE — 3331090001 HH PPS REVENUE CREDIT

## 2018-11-13 VITALS
WEIGHT: 276.5 LBS | SYSTOLIC BLOOD PRESSURE: 105 MMHG | BODY MASS INDEX: 47.46 KG/M2 | DIASTOLIC BLOOD PRESSURE: 67 MMHG | OXYGEN SATURATION: 99 % | HEART RATE: 98 BPM | TEMPERATURE: 97 F | RESPIRATION RATE: 18 BRPM

## 2018-11-13 PROCEDURE — 3331090002 HH PPS REVENUE DEBIT

## 2018-11-13 PROCEDURE — 3331090001 HH PPS REVENUE CREDIT

## 2018-11-14 PROCEDURE — 3331090001 HH PPS REVENUE CREDIT

## 2018-11-14 PROCEDURE — 3331090002 HH PPS REVENUE DEBIT

## 2018-11-15 ENCOUNTER — HOME CARE VISIT (OUTPATIENT)
Dept: SCHEDULING | Facility: HOME HEALTH | Age: 43
End: 2018-11-15
Payer: MEDICARE

## 2018-11-15 VITALS
HEART RATE: 96 BPM | OXYGEN SATURATION: 98 % | SYSTOLIC BLOOD PRESSURE: 98 MMHG | DIASTOLIC BLOOD PRESSURE: 52 MMHG | TEMPERATURE: 98 F | RESPIRATION RATE: 18 BRPM

## 2018-11-15 PROCEDURE — 3331090001 HH PPS REVENUE CREDIT

## 2018-11-15 PROCEDURE — G0299 HHS/HOSPICE OF RN EA 15 MIN: HCPCS

## 2018-11-15 PROCEDURE — 3331090002 HH PPS REVENUE DEBIT

## 2018-11-16 PROCEDURE — 3331090001 HH PPS REVENUE CREDIT

## 2018-11-16 PROCEDURE — 3331090002 HH PPS REVENUE DEBIT

## 2018-11-17 PROCEDURE — 3331090001 HH PPS REVENUE CREDIT

## 2018-11-17 PROCEDURE — 3331090002 HH PPS REVENUE DEBIT

## 2018-11-18 PROCEDURE — 3331090002 HH PPS REVENUE DEBIT

## 2018-11-18 PROCEDURE — 3331090001 HH PPS REVENUE CREDIT

## 2018-11-19 ENCOUNTER — HOME CARE VISIT (OUTPATIENT)
Dept: SCHEDULING | Facility: HOME HEALTH | Age: 43
End: 2018-11-19
Payer: MEDICARE

## 2018-11-19 VITALS
WEIGHT: 273.6 LBS | RESPIRATION RATE: 18 BRPM | HEART RATE: 82 BPM | SYSTOLIC BLOOD PRESSURE: 114 MMHG | TEMPERATURE: 97.8 F | OXYGEN SATURATION: 98 % | DIASTOLIC BLOOD PRESSURE: 76 MMHG | BODY MASS INDEX: 46.96 KG/M2

## 2018-11-19 PROCEDURE — 3331090002 HH PPS REVENUE DEBIT

## 2018-11-19 PROCEDURE — 3331090001 HH PPS REVENUE CREDIT

## 2018-11-19 PROCEDURE — G0300 HHS/HOSPICE OF LPN EA 15 MIN: HCPCS

## 2018-11-20 PROCEDURE — 3331090002 HH PPS REVENUE DEBIT

## 2018-11-20 PROCEDURE — 3331090001 HH PPS REVENUE CREDIT

## 2018-11-21 PROCEDURE — A4333 URINARY CATH ANCHOR DEVICE: HCPCS

## 2018-11-21 PROCEDURE — A4409 OST SKN BARR CONVEX <=4 SQ I: HCPCS

## 2018-11-21 PROCEDURE — A6216 NON-STERILE GAUZE<=16 SQ IN: HCPCS

## 2018-11-21 PROCEDURE — A4628 OROPHARYNGEAL SUCTION CATH: HCPCS

## 2018-11-21 PROCEDURE — 3331090002 HH PPS REVENUE DEBIT

## 2018-11-21 PROCEDURE — A4452 WATERPROOF TAPE: HCPCS

## 2018-11-21 PROCEDURE — 3331090001 HH PPS REVENUE CREDIT

## 2018-11-21 PROCEDURE — A5120 SKIN BARRIER, WIPE OR SWAB: HCPCS

## 2018-11-21 PROCEDURE — A4425 OST PCH DRAIN FOR BARRIER FL: HCPCS

## 2018-11-22 PROCEDURE — 3331090001 HH PPS REVENUE CREDIT

## 2018-11-22 PROCEDURE — 3331090002 HH PPS REVENUE DEBIT

## 2018-11-23 ENCOUNTER — HOME CARE VISIT (OUTPATIENT)
Dept: HOME HEALTH SERVICES | Facility: HOME HEALTH | Age: 43
End: 2018-11-23
Payer: MEDICARE

## 2018-11-23 PROCEDURE — 3331090001 HH PPS REVENUE CREDIT

## 2018-11-23 PROCEDURE — 3331090002 HH PPS REVENUE DEBIT

## 2018-11-24 PROCEDURE — 3331090002 HH PPS REVENUE DEBIT

## 2018-11-24 PROCEDURE — 3331090001 HH PPS REVENUE CREDIT

## 2018-11-25 PROCEDURE — 3331090001 HH PPS REVENUE CREDIT

## 2018-11-25 PROCEDURE — 3331090002 HH PPS REVENUE DEBIT

## 2018-11-26 ENCOUNTER — HOME CARE VISIT (OUTPATIENT)
Dept: SCHEDULING | Facility: HOME HEALTH | Age: 43
End: 2018-11-26
Payer: MEDICARE

## 2018-11-26 VITALS
WEIGHT: 269.8 LBS | OXYGEN SATURATION: 100 % | TEMPERATURE: 97.2 F | SYSTOLIC BLOOD PRESSURE: 98 MMHG | RESPIRATION RATE: 18 BRPM | HEART RATE: 100 BPM | BODY MASS INDEX: 46.31 KG/M2 | DIASTOLIC BLOOD PRESSURE: 60 MMHG

## 2018-11-26 PROCEDURE — 3331090002 HH PPS REVENUE DEBIT

## 2018-11-26 PROCEDURE — 3331090001 HH PPS REVENUE CREDIT

## 2018-11-26 PROCEDURE — G0300 HHS/HOSPICE OF LPN EA 15 MIN: HCPCS

## 2018-11-26 PROCEDURE — 400014 HH F/U

## 2018-11-27 PROCEDURE — 3331090001 HH PPS REVENUE CREDIT

## 2018-11-27 PROCEDURE — 3331090002 HH PPS REVENUE DEBIT

## 2018-11-28 PROCEDURE — 3331090001 HH PPS REVENUE CREDIT

## 2018-11-28 PROCEDURE — 3331090002 HH PPS REVENUE DEBIT

## 2018-11-29 ENCOUNTER — HOME CARE VISIT (OUTPATIENT)
Dept: SCHEDULING | Facility: HOME HEALTH | Age: 43
End: 2018-11-29
Payer: MEDICARE

## 2018-11-29 VITALS
TEMPERATURE: 97.3 F | HEART RATE: 90 BPM | RESPIRATION RATE: 18 BRPM | OXYGEN SATURATION: 100 % | DIASTOLIC BLOOD PRESSURE: 68 MMHG | SYSTOLIC BLOOD PRESSURE: 105 MMHG

## 2018-11-29 PROCEDURE — A6212 FOAM DRG <=16 SQ IN W/BORDER: HCPCS

## 2018-11-29 PROCEDURE — 3331090001 HH PPS REVENUE CREDIT

## 2018-11-29 PROCEDURE — A4357 BEDSIDE DRAINAGE BAG: HCPCS

## 2018-11-29 PROCEDURE — G0299 HHS/HOSPICE OF RN EA 15 MIN: HCPCS

## 2018-11-29 PROCEDURE — 3331090002 HH PPS REVENUE DEBIT

## 2018-11-30 PROCEDURE — 3331090001 HH PPS REVENUE CREDIT

## 2018-11-30 PROCEDURE — 3331090002 HH PPS REVENUE DEBIT

## 2018-12-01 PROCEDURE — 3331090001 HH PPS REVENUE CREDIT

## 2018-12-01 PROCEDURE — 3331090002 HH PPS REVENUE DEBIT

## 2018-12-02 PROCEDURE — 3331090002 HH PPS REVENUE DEBIT

## 2018-12-02 PROCEDURE — 3331090001 HH PPS REVENUE CREDIT

## 2018-12-03 ENCOUNTER — HOME CARE VISIT (OUTPATIENT)
Dept: SCHEDULING | Facility: HOME HEALTH | Age: 43
End: 2018-12-03
Payer: MEDICARE

## 2018-12-03 PROCEDURE — 3331090001 HH PPS REVENUE CREDIT

## 2018-12-03 PROCEDURE — 3331090002 HH PPS REVENUE DEBIT

## 2018-12-03 PROCEDURE — G0300 HHS/HOSPICE OF LPN EA 15 MIN: HCPCS

## 2018-12-04 VITALS
SYSTOLIC BLOOD PRESSURE: 102 MMHG | RESPIRATION RATE: 18 BRPM | DIASTOLIC BLOOD PRESSURE: 66 MMHG | HEART RATE: 89 BPM | OXYGEN SATURATION: 99 % | TEMPERATURE: 97 F | BODY MASS INDEX: 47.89 KG/M2 | WEIGHT: 279 LBS

## 2018-12-04 PROCEDURE — 3331090002 HH PPS REVENUE DEBIT

## 2018-12-04 PROCEDURE — 3331090001 HH PPS REVENUE CREDIT

## 2018-12-05 PROCEDURE — 3331090002 HH PPS REVENUE DEBIT

## 2018-12-05 PROCEDURE — 3331090001 HH PPS REVENUE CREDIT

## 2018-12-06 ENCOUNTER — HOME CARE VISIT (OUTPATIENT)
Dept: SCHEDULING | Facility: HOME HEALTH | Age: 43
End: 2018-12-06
Payer: MEDICARE

## 2018-12-06 VITALS
HEART RATE: 86 BPM | DIASTOLIC BLOOD PRESSURE: 57 MMHG | TEMPERATURE: 98.5 F | SYSTOLIC BLOOD PRESSURE: 83 MMHG | RESPIRATION RATE: 18 BRPM | OXYGEN SATURATION: 98 %

## 2018-12-06 PROCEDURE — G0299 HHS/HOSPICE OF RN EA 15 MIN: HCPCS

## 2018-12-06 PROCEDURE — 3331090001 HH PPS REVENUE CREDIT

## 2018-12-06 PROCEDURE — 3331090002 HH PPS REVENUE DEBIT

## 2018-12-07 PROCEDURE — 3331090002 HH PPS REVENUE DEBIT

## 2018-12-07 PROCEDURE — 3331090001 HH PPS REVENUE CREDIT

## 2018-12-08 ENCOUNTER — HOME CARE VISIT (OUTPATIENT)
Dept: HOME HEALTH SERVICES | Facility: HOME HEALTH | Age: 43
End: 2018-12-08
Payer: MEDICARE

## 2018-12-08 PROCEDURE — 3331090001 HH PPS REVENUE CREDIT

## 2018-12-08 PROCEDURE — 3331090002 HH PPS REVENUE DEBIT

## 2018-12-09 PROCEDURE — 3331090002 HH PPS REVENUE DEBIT

## 2018-12-09 PROCEDURE — 3331090001 HH PPS REVENUE CREDIT

## 2018-12-10 ENCOUNTER — HOME CARE VISIT (OUTPATIENT)
Dept: HOME HEALTH SERVICES | Facility: HOME HEALTH | Age: 43
End: 2018-12-10
Payer: MEDICARE

## 2018-12-10 PROCEDURE — 3331090001 HH PPS REVENUE CREDIT

## 2018-12-10 PROCEDURE — 3331090002 HH PPS REVENUE DEBIT

## 2018-12-11 ENCOUNTER — HOME CARE VISIT (OUTPATIENT)
Dept: SCHEDULING | Facility: HOME HEALTH | Age: 43
End: 2018-12-11
Payer: MEDICARE

## 2018-12-11 VITALS
HEART RATE: 78 BPM | DIASTOLIC BLOOD PRESSURE: 67 MMHG | TEMPERATURE: 97.3 F | OXYGEN SATURATION: 99 % | RESPIRATION RATE: 18 BRPM | SYSTOLIC BLOOD PRESSURE: 99 MMHG

## 2018-12-11 PROCEDURE — 3331090002 HH PPS REVENUE DEBIT

## 2018-12-11 PROCEDURE — 3331090001 HH PPS REVENUE CREDIT

## 2018-12-11 PROCEDURE — G0299 HHS/HOSPICE OF RN EA 15 MIN: HCPCS

## 2018-12-12 PROCEDURE — 3331090001 HH PPS REVENUE CREDIT

## 2018-12-12 PROCEDURE — 3331090002 HH PPS REVENUE DEBIT

## 2018-12-13 ENCOUNTER — TELEPHONE (OUTPATIENT)
Dept: SURGERY | Age: 43
End: 2018-12-13

## 2018-12-13 ENCOUNTER — HOME CARE VISIT (OUTPATIENT)
Dept: SCHEDULING | Facility: HOME HEALTH | Age: 43
End: 2018-12-13
Payer: MEDICARE

## 2018-12-13 PROCEDURE — 3331090002 HH PPS REVENUE DEBIT

## 2018-12-13 PROCEDURE — 3331090001 HH PPS REVENUE CREDIT

## 2018-12-13 PROCEDURE — G0300 HHS/HOSPICE OF LPN EA 15 MIN: HCPCS

## 2018-12-13 NOTE — TELEPHONE ENCOUNTER
Genesis Singh from [facility] [place] called regarding supplies for fistula. She said [facility] is no longer paying for for the supplies and she needs help with the insurance.

## 2018-12-14 PROCEDURE — 3331090002 HH PPS REVENUE DEBIT

## 2018-12-14 PROCEDURE — 3331090001 HH PPS REVENUE CREDIT

## 2018-12-14 NOTE — TELEPHONE ENCOUNTER
I returned Steffen call [phone],  with [facility], and she states [employer] in no longer paying for patients wound care supplies stating its considered a 'kick back' because patient has insurance. She states patients insurance won't cover 'specialty items' like the Eakins pouch and rings as they are very expensive. She states they discussed patient getting on medicare and the mother is working on that. [employer] had a wound specialist that was ordering supplies but has left for another position. She states they are waiting for [employer] who provides the supplies to get back in touch with them and/or the patient to let them know what the cost to the patient is. She wants to know if Dr. Pari So can say these are medically necessary. I told her if she needs Dr. Pari So to fill out any forms for this or do peer to peer for denial to let us know in writing what it is he needs to do and for whom. I told her this may require current wound measurements, wound status, etc which they would have to provide and patient will more than likely need an office visit since her last one was 8/14/18. At that time she was to have an UGI and return to office in 2 months. The UGI was scheduled and cancelled by patient twice. John Smart said she would speak with her supervisor and let us know once [employer] and Medicare decisions  were made and let us know from there.

## 2018-12-15 PROCEDURE — 3331090002 HH PPS REVENUE DEBIT

## 2018-12-15 PROCEDURE — 3331090001 HH PPS REVENUE CREDIT

## 2018-12-16 PROCEDURE — 3331090002 HH PPS REVENUE DEBIT

## 2018-12-16 PROCEDURE — 3331090001 HH PPS REVENUE CREDIT

## 2018-12-17 ENCOUNTER — HOME CARE VISIT (OUTPATIENT)
Dept: SCHEDULING | Facility: HOME HEALTH | Age: 43
End: 2018-12-17
Payer: MEDICARE

## 2018-12-17 VITALS
BODY MASS INDEX: 47.44 KG/M2 | OXYGEN SATURATION: 99 % | HEART RATE: 89 BPM | SYSTOLIC BLOOD PRESSURE: 98 MMHG | DIASTOLIC BLOOD PRESSURE: 60 MMHG | TEMPERATURE: 97.5 F | WEIGHT: 276.4 LBS | RESPIRATION RATE: 18 BRPM

## 2018-12-17 PROCEDURE — 3331090002 HH PPS REVENUE DEBIT

## 2018-12-17 PROCEDURE — 3331090001 HH PPS REVENUE CREDIT

## 2018-12-17 PROCEDURE — G0300 HHS/HOSPICE OF LPN EA 15 MIN: HCPCS

## 2018-12-18 ENCOUNTER — TELEPHONE (OUTPATIENT)
Dept: SURGERY | Age: 43
End: 2018-12-18

## 2018-12-18 PROCEDURE — 3331090002 HH PPS REVENUE DEBIT

## 2018-12-18 PROCEDURE — 3331090001 HH PPS REVENUE CREDIT

## 2018-12-19 ENCOUNTER — TELEPHONE (OUTPATIENT)
Dept: SURGERY | Age: 43
End: 2018-12-19

## 2018-12-19 VITALS
DIASTOLIC BLOOD PRESSURE: 62 MMHG | OXYGEN SATURATION: 99 % | HEART RATE: 90 BPM | TEMPERATURE: 97.6 F | SYSTOLIC BLOOD PRESSURE: 100 MMHG | RESPIRATION RATE: 18 BRPM

## 2018-12-19 PROCEDURE — 3331090001 HH PPS REVENUE CREDIT

## 2018-12-19 PROCEDURE — 3331090002 HH PPS REVENUE DEBIT

## 2018-12-19 NOTE — TELEPHONE ENCOUNTER
Spoke with Aamir Melchor with [facility] prior [employer] service system. Medication Gattex approved 12/30/18-12/31/19 auth # [id number]. They will fax copy of confirmation to [phone] and mail copy to patient. Returned call to [employer]. Authorization code given with dates.  They will call if any other questions or concerns

## 2018-12-19 NOTE — TELEPHONE ENCOUNTER
==View-only below this line===    ----- Message -----  From: Cassie Salinas LPN  Sent:   11:43 AM  To: Maggie Rodriguez LPN  Subject: prior Vivian Fields sent me a message for prior auth on [employer]. I seen they called and I told them we already received a message and it is a process to get auth and we are working on it and that it doesn't  till the end of the month. I went ahead and called and received the auth. I will document info and call pharmacy with auth#. A fax may come threw in [phone] confirming.     Thank you

## 2018-12-19 NOTE — TELEPHONE ENCOUNTER
Patients Gattex 5 mg prescription expires 12/31/2018. Elvia Major was wondering if we are going to re-up it.

## 2018-12-19 NOTE — TELEPHONE ENCOUNTER
Informed rep message already received that prior auth expires 12/31/8. We are currently working on [facility] will call when completed.

## 2018-12-20 ENCOUNTER — HOME CARE VISIT (OUTPATIENT)
Dept: SCHEDULING | Facility: HOME HEALTH | Age: 43
End: 2018-12-20
Payer: MEDICARE

## 2018-12-20 PROCEDURE — 3331090002 HH PPS REVENUE DEBIT

## 2018-12-20 PROCEDURE — G0300 HHS/HOSPICE OF LPN EA 15 MIN: HCPCS

## 2018-12-20 PROCEDURE — 3331090001 HH PPS REVENUE CREDIT

## 2018-12-21 PROCEDURE — 3331090001 HH PPS REVENUE CREDIT

## 2018-12-21 PROCEDURE — 3331090002 HH PPS REVENUE DEBIT

## 2018-12-22 PROCEDURE — 3331090001 HH PPS REVENUE CREDIT

## 2018-12-22 PROCEDURE — 3331090002 HH PPS REVENUE DEBIT

## 2018-12-23 PROCEDURE — 3331090002 HH PPS REVENUE DEBIT

## 2018-12-23 PROCEDURE — 3331090001 HH PPS REVENUE CREDIT

## 2018-12-24 PROCEDURE — 3331090002 HH PPS REVENUE DEBIT

## 2018-12-24 PROCEDURE — 3331090001 HH PPS REVENUE CREDIT

## 2018-12-25 PROCEDURE — 3331090001 HH PPS REVENUE CREDIT

## 2018-12-25 PROCEDURE — 3331090002 HH PPS REVENUE DEBIT

## 2018-12-26 ENCOUNTER — HOME CARE VISIT (OUTPATIENT)
Dept: SCHEDULING | Facility: HOME HEALTH | Age: 43
End: 2018-12-26
Payer: MEDICARE

## 2018-12-26 VITALS
DIASTOLIC BLOOD PRESSURE: 55 MMHG | HEART RATE: 85 BPM | OXYGEN SATURATION: 98 % | RESPIRATION RATE: 18 BRPM | TEMPERATURE: 97.8 F | WEIGHT: 271.4 LBS | SYSTOLIC BLOOD PRESSURE: 86 MMHG | BODY MASS INDEX: 46.59 KG/M2

## 2018-12-26 PROCEDURE — 3331090001 HH PPS REVENUE CREDIT

## 2018-12-26 PROCEDURE — 3331090002 HH PPS REVENUE DEBIT

## 2018-12-26 PROCEDURE — G0300 HHS/HOSPICE OF LPN EA 15 MIN: HCPCS

## 2018-12-27 VITALS
TEMPERATURE: 97.7 F | OXYGEN SATURATION: 97 % | DIASTOLIC BLOOD PRESSURE: 74 MMHG | RESPIRATION RATE: 18 BRPM | SYSTOLIC BLOOD PRESSURE: 107 MMHG | HEART RATE: 88 BPM

## 2018-12-27 PROCEDURE — 3331090002 HH PPS REVENUE DEBIT

## 2018-12-27 PROCEDURE — 3331090001 HH PPS REVENUE CREDIT

## 2018-12-28 ENCOUNTER — HOME CARE VISIT (OUTPATIENT)
Dept: HOME HEALTH SERVICES | Facility: HOME HEALTH | Age: 43
End: 2018-12-28
Payer: MEDICARE

## 2018-12-28 PROCEDURE — 3331090001 HH PPS REVENUE CREDIT

## 2018-12-28 PROCEDURE — 3331090002 HH PPS REVENUE DEBIT

## 2018-12-29 PROCEDURE — 3331090002 HH PPS REVENUE DEBIT

## 2018-12-29 PROCEDURE — 3331090001 HH PPS REVENUE CREDIT

## 2018-12-30 PROCEDURE — 3331090002 HH PPS REVENUE DEBIT

## 2018-12-30 PROCEDURE — 3331090001 HH PPS REVENUE CREDIT

## 2018-12-31 ENCOUNTER — HOME CARE VISIT (OUTPATIENT)
Dept: SCHEDULING | Facility: HOME HEALTH | Age: 43
End: 2018-12-31
Payer: MEDICARE

## 2018-12-31 PROCEDURE — 3331090002 HH PPS REVENUE DEBIT

## 2018-12-31 PROCEDURE — G0300 HHS/HOSPICE OF LPN EA 15 MIN: HCPCS

## 2018-12-31 PROCEDURE — 3331090001 HH PPS REVENUE CREDIT

## 2019-01-01 PROCEDURE — 3331090002 HH PPS REVENUE DEBIT

## 2019-01-01 PROCEDURE — 3331090001 HH PPS REVENUE CREDIT

## 2019-01-02 VITALS
RESPIRATION RATE: 18 BRPM | SYSTOLIC BLOOD PRESSURE: 124 MMHG | TEMPERATURE: 97.2 F | HEART RATE: 88 BPM | OXYGEN SATURATION: 98 % | DIASTOLIC BLOOD PRESSURE: 80 MMHG

## 2019-01-02 PROCEDURE — 3331090002 HH PPS REVENUE DEBIT

## 2019-01-02 PROCEDURE — 3331090001 HH PPS REVENUE CREDIT

## 2019-01-03 ENCOUNTER — HOME CARE VISIT (OUTPATIENT)
Dept: SCHEDULING | Facility: HOME HEALTH | Age: 44
End: 2019-01-03
Payer: MEDICARE

## 2019-01-03 VITALS
RESPIRATION RATE: 18 BRPM | HEART RATE: 87 BPM | SYSTOLIC BLOOD PRESSURE: 106 MMHG | BODY MASS INDEX: 46.41 KG/M2 | WEIGHT: 270.4 LBS | DIASTOLIC BLOOD PRESSURE: 68 MMHG | OXYGEN SATURATION: 99 % | TEMPERATURE: 97.5 F

## 2019-01-03 PROCEDURE — 3331090001 HH PPS REVENUE CREDIT

## 2019-01-03 PROCEDURE — 3331090002 HH PPS REVENUE DEBIT

## 2019-01-03 PROCEDURE — G0300 HHS/HOSPICE OF LPN EA 15 MIN: HCPCS

## 2019-01-04 PROCEDURE — 3331090001 HH PPS REVENUE CREDIT

## 2019-01-04 PROCEDURE — 3331090002 HH PPS REVENUE DEBIT

## 2019-01-04 NOTE — TELEPHONE ENCOUNTER
I returned nurse- Yani Willard- phone call [phone] with [facility], and left message , per Dr. Fabienne James for patient to make an appointment with him on Tuesday 9/12/17.
Quality 226: Preventive Care And Screening: Tobacco Use: Screening And Cessation Intervention: Patient screened for tobacco use and is an ex/non-smoker
Quality 111:Pneumonia Vaccination Status For Older Adults: Pneumococcal Vaccination Previously Received
Quality 130: Documentation Of Current Medications In The Medical Record: Current Medications Documented
Detail Level: Detailed
Quality 110: Preventive Care And Screening: Influenza Immunization: Influenza Immunization previously received during influenza season

## 2019-01-05 PROCEDURE — 3331090001 HH PPS REVENUE CREDIT

## 2019-01-05 PROCEDURE — 3331090002 HH PPS REVENUE DEBIT

## 2019-01-06 PROCEDURE — 3331090001 HH PPS REVENUE CREDIT

## 2019-01-06 PROCEDURE — 3331090002 HH PPS REVENUE DEBIT

## 2019-01-07 ENCOUNTER — HOME CARE VISIT (OUTPATIENT)
Dept: SCHEDULING | Facility: HOME HEALTH | Age: 44
End: 2019-01-07
Payer: MEDICARE

## 2019-01-07 PROCEDURE — G0300 HHS/HOSPICE OF LPN EA 15 MIN: HCPCS

## 2019-01-07 PROCEDURE — 3331090002 HH PPS REVENUE DEBIT

## 2019-01-07 PROCEDURE — 3331090001 HH PPS REVENUE CREDIT

## 2019-01-08 ENCOUNTER — TELEPHONE (OUTPATIENT)
Dept: SURGERY | Age: 44
End: 2019-01-08

## 2019-01-08 VITALS
SYSTOLIC BLOOD PRESSURE: 98 MMHG | WEIGHT: 273.4 LBS | HEART RATE: 89 BPM | RESPIRATION RATE: 18 BRPM | BODY MASS INDEX: 46.93 KG/M2 | DIASTOLIC BLOOD PRESSURE: 60 MMHG | OXYGEN SATURATION: 98 % | TEMPERATURE: 97.7 F

## 2019-01-08 PROCEDURE — 3331090002 HH PPS REVENUE DEBIT

## 2019-01-08 PROCEDURE — 3331090001 HH PPS REVENUE CREDIT

## 2019-01-08 NOTE — TELEPHONE ENCOUNTER
I returned call from [facility] with bioscript [phone]. I left a message and told her Dr. Tosin Bejarano is not back in the office until Friday at 5 and maybe they should call the physician who takes care of her chrons if she is having a flare up.

## 2019-01-08 NOTE — TELEPHONE ENCOUNTER
Received call from Janeen Thompson with [facility] wanting to leave a message for Dr. Waldemar Braga in regards to if he is willing to do prn hydration bun and Cr are elevated with her crohns flare up.

## 2019-01-09 ENCOUNTER — TELEPHONE (OUTPATIENT)
Dept: SURGERY | Age: 44
End: 2019-01-09

## 2019-01-09 PROCEDURE — 3331090001 HH PPS REVENUE CREDIT

## 2019-01-09 PROCEDURE — 3331090002 HH PPS REVENUE DEBIT

## 2019-01-09 NOTE — TELEPHONE ENCOUNTER
I returned Flor Morrison with [employer] call and she states she talked to the patients mother and the mother said the patient seemed to be having a chrones flair up. Flor Morrison wanted to know if Dr. Lubna Krueger would like to hydrate the patient of change her TPN orders. I told her Dr. Lubna Krueger would not be in the office until 5pm Friday and if she was having a chrons flair up maybe they should talk with the physician who takes care of that for her.

## 2019-01-09 NOTE — TELEPHONE ENCOUNTER
Spoke with Tatiana Salazar, at [address] [place] [address]. Per Dr. Mu Heck, gave orders for 2 liters of LR to be infused over 4 hours. Check labs in 2 day and patient needs to see him at his next day of clinic on 1/15/19. She clarified that the extra set of labs they are requesting was for chemistries next week to f/u on the pt's elevated BUN/Cr. Gave approval for follow up labs. Per Marquise Berman, pt saw her GI doctor Dr. Demond Matthew on 12/2/18 for a Crohn's flare and they are waiting for a pre-auth to get approved for a infusion to treat the Crohn's disease. She will fax over a copy of the  Dr. Gee Pedro office note. Marquise Berman said she would call the pt's mother and inform her the POC and pt to be seen by Dr. Mu Heck next week.

## 2019-01-10 ENCOUNTER — HOME CARE VISIT (OUTPATIENT)
Dept: SCHEDULING | Facility: HOME HEALTH | Age: 44
End: 2019-01-10
Payer: MEDICARE

## 2019-01-10 ENCOUNTER — HOME CARE VISIT (OUTPATIENT)
Dept: HOME HEALTH SERVICES | Facility: HOME HEALTH | Age: 44
End: 2019-01-10
Payer: MEDICARE

## 2019-01-10 VITALS
SYSTOLIC BLOOD PRESSURE: 98 MMHG | RESPIRATION RATE: 18 BRPM | HEART RATE: 90 BPM | OXYGEN SATURATION: 99 % | DIASTOLIC BLOOD PRESSURE: 58 MMHG | TEMPERATURE: 97.1 F

## 2019-01-10 PROCEDURE — 3331090002 HH PPS REVENUE DEBIT

## 2019-01-10 PROCEDURE — G0300 HHS/HOSPICE OF LPN EA 15 MIN: HCPCS

## 2019-01-10 PROCEDURE — 3331090001 HH PPS REVENUE CREDIT

## 2019-01-11 PROCEDURE — 3331090001 HH PPS REVENUE CREDIT

## 2019-01-11 PROCEDURE — 3331090002 HH PPS REVENUE DEBIT

## 2019-01-12 PROCEDURE — 3331090002 HH PPS REVENUE DEBIT

## 2019-01-12 PROCEDURE — 3331090001 HH PPS REVENUE CREDIT

## 2019-01-13 PROCEDURE — 3331090001 HH PPS REVENUE CREDIT

## 2019-01-13 PROCEDURE — 3331090002 HH PPS REVENUE DEBIT

## 2019-01-14 ENCOUNTER — HOME CARE VISIT (OUTPATIENT)
Dept: SCHEDULING | Facility: HOME HEALTH | Age: 44
End: 2019-01-14
Payer: MEDICARE

## 2019-01-14 VITALS
OXYGEN SATURATION: 98 % | WEIGHT: 274.8 LBS | TEMPERATURE: 97.1 F | RESPIRATION RATE: 18 BRPM | BODY MASS INDEX: 47.17 KG/M2 | DIASTOLIC BLOOD PRESSURE: 56 MMHG | SYSTOLIC BLOOD PRESSURE: 98 MMHG | HEART RATE: 63 BPM

## 2019-01-14 PROCEDURE — 3331090001 HH PPS REVENUE CREDIT

## 2019-01-14 PROCEDURE — 3331090002 HH PPS REVENUE DEBIT

## 2019-01-14 PROCEDURE — G0300 HHS/HOSPICE OF LPN EA 15 MIN: HCPCS

## 2019-01-15 ENCOUNTER — TELEPHONE (OUTPATIENT)
Dept: SURGERY | Age: 44
End: 2019-01-15

## 2019-01-15 PROCEDURE — 3331090002 HH PPS REVENUE DEBIT

## 2019-01-15 PROCEDURE — 3331090001 HH PPS REVENUE CREDIT

## 2019-01-16 PROCEDURE — 3331090001 HH PPS REVENUE CREDIT

## 2019-01-16 PROCEDURE — 3331090002 HH PPS REVENUE DEBIT

## 2019-01-17 ENCOUNTER — HOME CARE VISIT (OUTPATIENT)
Dept: SCHEDULING | Facility: HOME HEALTH | Age: 44
End: 2019-01-17
Payer: MEDICARE

## 2019-01-17 VITALS
DIASTOLIC BLOOD PRESSURE: 59 MMHG | HEART RATE: 80 BPM | SYSTOLIC BLOOD PRESSURE: 90 MMHG | RESPIRATION RATE: 22 BRPM | TEMPERATURE: 98.3 F | OXYGEN SATURATION: 98 %

## 2019-01-17 PROCEDURE — G0299 HHS/HOSPICE OF RN EA 15 MIN: HCPCS

## 2019-01-17 PROCEDURE — 3331090002 HH PPS REVENUE DEBIT

## 2019-01-17 PROCEDURE — 3331090001 HH PPS REVENUE CREDIT

## 2019-01-18 PROCEDURE — 3331090001 HH PPS REVENUE CREDIT

## 2019-01-18 PROCEDURE — 3331090003 HH PPS REVENUE ADJ

## 2019-01-18 PROCEDURE — 3331090002 HH PPS REVENUE DEBIT

## 2019-01-19 PROCEDURE — 3331090002 HH PPS REVENUE DEBIT

## 2019-01-19 PROCEDURE — 3331090001 HH PPS REVENUE CREDIT

## 2019-01-20 PROCEDURE — 3331090002 HH PPS REVENUE DEBIT

## 2019-01-20 PROCEDURE — 3331090001 HH PPS REVENUE CREDIT

## 2019-01-21 ENCOUNTER — HOME CARE VISIT (OUTPATIENT)
Dept: SCHEDULING | Facility: HOME HEALTH | Age: 44
End: 2019-01-21
Payer: MEDICARE

## 2019-01-21 VITALS
TEMPERATURE: 97.4 F | HEART RATE: 78 BPM | SYSTOLIC BLOOD PRESSURE: 90 MMHG | OXYGEN SATURATION: 98 % | RESPIRATION RATE: 18 BRPM | DIASTOLIC BLOOD PRESSURE: 62 MMHG | BODY MASS INDEX: 46.65 KG/M2 | WEIGHT: 271.8 LBS

## 2019-01-21 PROCEDURE — 3331090001 HH PPS REVENUE CREDIT

## 2019-01-21 PROCEDURE — G0300 HHS/HOSPICE OF LPN EA 15 MIN: HCPCS

## 2019-01-21 PROCEDURE — 400014 HH F/U

## 2019-01-21 PROCEDURE — 3331090002 HH PPS REVENUE DEBIT

## 2019-01-22 PROCEDURE — 3331090002 HH PPS REVENUE DEBIT

## 2019-01-22 PROCEDURE — 3331090001 HH PPS REVENUE CREDIT

## 2019-01-23 PROCEDURE — 3331090001 HH PPS REVENUE CREDIT

## 2019-01-23 PROCEDURE — 3331090002 HH PPS REVENUE DEBIT

## 2019-01-24 PROCEDURE — 3331090001 HH PPS REVENUE CREDIT

## 2019-01-24 PROCEDURE — 3331090002 HH PPS REVENUE DEBIT

## 2019-01-25 ENCOUNTER — HOME CARE VISIT (OUTPATIENT)
Dept: HOME HEALTH SERVICES | Facility: HOME HEALTH | Age: 44
End: 2019-01-25
Payer: MEDICARE

## 2019-01-25 PROCEDURE — 3331090002 HH PPS REVENUE DEBIT

## 2019-01-25 PROCEDURE — 3331090001 HH PPS REVENUE CREDIT

## 2019-01-26 PROCEDURE — 3331090001 HH PPS REVENUE CREDIT

## 2019-01-26 PROCEDURE — 3331090002 HH PPS REVENUE DEBIT

## 2019-01-27 PROCEDURE — 3331090001 HH PPS REVENUE CREDIT

## 2019-01-27 PROCEDURE — 3331090002 HH PPS REVENUE DEBIT

## 2019-01-28 ENCOUNTER — HOME CARE VISIT (OUTPATIENT)
Dept: HOME HEALTH SERVICES | Facility: HOME HEALTH | Age: 44
End: 2019-01-28
Payer: MEDICARE

## 2019-01-28 ENCOUNTER — HOME CARE VISIT (OUTPATIENT)
Dept: SCHEDULING | Facility: HOME HEALTH | Age: 44
End: 2019-01-28
Payer: MEDICARE

## 2019-01-28 PROCEDURE — 3331090002 HH PPS REVENUE DEBIT

## 2019-01-28 PROCEDURE — G0300 HHS/HOSPICE OF LPN EA 15 MIN: HCPCS

## 2019-01-28 PROCEDURE — 3331090001 HH PPS REVENUE CREDIT

## 2019-01-29 VITALS
OXYGEN SATURATION: 99 % | DIASTOLIC BLOOD PRESSURE: 70 MMHG | TEMPERATURE: 97.7 F | SYSTOLIC BLOOD PRESSURE: 118 MMHG | HEART RATE: 68 BPM | RESPIRATION RATE: 18 BRPM

## 2019-01-29 PROCEDURE — 3331090002 HH PPS REVENUE DEBIT

## 2019-01-29 PROCEDURE — 3331090001 HH PPS REVENUE CREDIT

## 2019-01-30 PROCEDURE — 3331090002 HH PPS REVENUE DEBIT

## 2019-01-30 PROCEDURE — 3331090001 HH PPS REVENUE CREDIT

## 2019-01-31 ENCOUNTER — HOME CARE VISIT (OUTPATIENT)
Dept: SCHEDULING | Facility: HOME HEALTH | Age: 44
End: 2019-01-31
Payer: MEDICARE

## 2019-01-31 VITALS
HEART RATE: 74 BPM | SYSTOLIC BLOOD PRESSURE: 97 MMHG | TEMPERATURE: 97.5 F | OXYGEN SATURATION: 99 % | DIASTOLIC BLOOD PRESSURE: 64 MMHG | BODY MASS INDEX: 46.35 KG/M2 | RESPIRATION RATE: 18 BRPM | WEIGHT: 270 LBS

## 2019-01-31 PROCEDURE — 3331090002 HH PPS REVENUE DEBIT

## 2019-01-31 PROCEDURE — 3331090001 HH PPS REVENUE CREDIT

## 2019-01-31 PROCEDURE — G0300 HHS/HOSPICE OF LPN EA 15 MIN: HCPCS

## 2019-02-01 PROCEDURE — 3331090001 HH PPS REVENUE CREDIT

## 2019-02-01 PROCEDURE — 3331090002 HH PPS REVENUE DEBIT

## 2019-02-02 PROCEDURE — 3331090001 HH PPS REVENUE CREDIT

## 2019-02-02 PROCEDURE — 3331090002 HH PPS REVENUE DEBIT

## 2019-02-03 PROCEDURE — 3331090002 HH PPS REVENUE DEBIT

## 2019-02-03 PROCEDURE — 3331090001 HH PPS REVENUE CREDIT

## 2019-02-04 ENCOUNTER — HOME CARE VISIT (OUTPATIENT)
Dept: SCHEDULING | Facility: HOME HEALTH | Age: 44
End: 2019-02-04
Payer: MEDICARE

## 2019-02-04 VITALS
TEMPERATURE: 97.8 F | HEART RATE: 81 BPM | RESPIRATION RATE: 18 BRPM | OXYGEN SATURATION: 98 % | SYSTOLIC BLOOD PRESSURE: 110 MMHG | DIASTOLIC BLOOD PRESSURE: 70 MMHG

## 2019-02-04 PROCEDURE — 3331090002 HH PPS REVENUE DEBIT

## 2019-02-04 PROCEDURE — 3331090001 HH PPS REVENUE CREDIT

## 2019-02-04 PROCEDURE — G0300 HHS/HOSPICE OF LPN EA 15 MIN: HCPCS

## 2019-02-05 ENCOUNTER — OFFICE VISIT (OUTPATIENT)
Dept: SURGERY | Age: 44
End: 2019-02-05

## 2019-02-05 VITALS
TEMPERATURE: 98 F | HEART RATE: 98 BPM | OXYGEN SATURATION: 97 % | RESPIRATION RATE: 16 BRPM | SYSTOLIC BLOOD PRESSURE: 122 MMHG | DIASTOLIC BLOOD PRESSURE: 82 MMHG

## 2019-02-05 DIAGNOSIS — K63.2 ENTEROCUTANEOUS FISTULA: Primary | ICD-10-CM

## 2019-02-05 PROCEDURE — 3331090001 HH PPS REVENUE CREDIT

## 2019-02-05 PROCEDURE — 3331090002 HH PPS REVENUE DEBIT

## 2019-02-05 RX ORDER — FLUCONAZOLE 150 MG/1
150 TABLET ORAL DAILY
Qty: 1 TAB | Refills: 0 | Status: SHIPPED | OUTPATIENT
Start: 2019-02-05 | End: 2019-02-06

## 2019-02-05 NOTE — PROGRESS NOTES
1. Have you been to the ER, urgent care clinic since your last visit? Hospitalized since your last visit? No    2. Have you seen or consulted any other health care providers outside of the [address] [place] [address] since your last visit? Include any pap smears or colon screening. PCP Dr. Ivelisse Cruz    Patient is here with her mother today. Patient is in a wheelchair and was unable to get on scale and up on exam table.     Patient states she is not taking Gattex injections right now due to Reggie's flair up    Patient states home health comes out twice a week    Patient states she is still getting TPN nightly    Patient states she is getting blood drawn monthly now

## 2019-02-06 ENCOUNTER — TELEPHONE (OUTPATIENT)
Dept: SURGERY | Age: 44
End: 2019-02-06

## 2019-02-06 PROCEDURE — 3331090001 HH PPS REVENUE CREDIT

## 2019-02-06 PROCEDURE — 3331090002 HH PPS REVENUE DEBIT

## 2019-02-07 ENCOUNTER — HOME CARE VISIT (OUTPATIENT)
Dept: SCHEDULING | Facility: HOME HEALTH | Age: 44
End: 2019-02-07
Payer: MEDICARE

## 2019-02-07 VITALS
RESPIRATION RATE: 18 BRPM | SYSTOLIC BLOOD PRESSURE: 98 MMHG | TEMPERATURE: 97.1 F | HEART RATE: 82 BPM | DIASTOLIC BLOOD PRESSURE: 66 MMHG | OXYGEN SATURATION: 98 %

## 2019-02-07 PROCEDURE — 3331090002 HH PPS REVENUE DEBIT

## 2019-02-07 PROCEDURE — G0300 HHS/HOSPICE OF LPN EA 15 MIN: HCPCS

## 2019-02-07 PROCEDURE — 3331090001 HH PPS REVENUE CREDIT

## 2019-02-07 NOTE — TELEPHONE ENCOUNTER
I returned mothers call and told her I had received order for signature for Eakins pouchs. She said there should have been 2 order requests coming thru. She will call on second one.

## 2019-02-08 PROCEDURE — 3331090001 HH PPS REVENUE CREDIT

## 2019-02-08 PROCEDURE — 3331090002 HH PPS REVENUE DEBIT

## 2019-02-09 PROCEDURE — 3331090001 HH PPS REVENUE CREDIT

## 2019-02-09 PROCEDURE — 3331090002 HH PPS REVENUE DEBIT

## 2019-02-10 PROCEDURE — 3331090001 HH PPS REVENUE CREDIT

## 2019-02-10 PROCEDURE — 3331090002 HH PPS REVENUE DEBIT

## 2019-02-11 ENCOUNTER — HOME CARE VISIT (OUTPATIENT)
Dept: SCHEDULING | Facility: HOME HEALTH | Age: 44
End: 2019-02-11
Payer: MEDICARE

## 2019-02-11 VITALS
OXYGEN SATURATION: 97 % | RESPIRATION RATE: 18 BRPM | DIASTOLIC BLOOD PRESSURE: 62 MMHG | HEART RATE: 77 BPM | SYSTOLIC BLOOD PRESSURE: 100 MMHG | TEMPERATURE: 97.9 F

## 2019-02-11 PROCEDURE — 3331090001 HH PPS REVENUE CREDIT

## 2019-02-11 PROCEDURE — G0300 HHS/HOSPICE OF LPN EA 15 MIN: HCPCS

## 2019-02-11 PROCEDURE — 3331090002 HH PPS REVENUE DEBIT

## 2019-02-12 PROCEDURE — 3331090001 HH PPS REVENUE CREDIT

## 2019-02-12 PROCEDURE — 3331090002 HH PPS REVENUE DEBIT

## 2019-02-13 PROCEDURE — 3331090002 HH PPS REVENUE DEBIT

## 2019-02-13 PROCEDURE — 3331090001 HH PPS REVENUE CREDIT

## 2019-02-14 ENCOUNTER — HOME CARE VISIT (OUTPATIENT)
Dept: SCHEDULING | Facility: HOME HEALTH | Age: 44
End: 2019-02-14
Payer: MEDICARE

## 2019-02-14 VITALS
HEART RATE: 82 BPM | SYSTOLIC BLOOD PRESSURE: 116 MMHG | DIASTOLIC BLOOD PRESSURE: 72 MMHG | OXYGEN SATURATION: 98 % | TEMPERATURE: 97.9 F | RESPIRATION RATE: 18 BRPM

## 2019-02-14 PROCEDURE — 3331090002 HH PPS REVENUE DEBIT

## 2019-02-14 PROCEDURE — 3331090001 HH PPS REVENUE CREDIT

## 2019-02-14 PROCEDURE — G0300 HHS/HOSPICE OF LPN EA 15 MIN: HCPCS

## 2019-02-15 ENCOUNTER — TELEPHONE (OUTPATIENT)
Dept: SURGERY | Age: 44
End: 2019-02-15

## 2019-02-15 PROCEDURE — 3331090001 HH PPS REVENUE CREDIT

## 2019-02-15 PROCEDURE — 3331090002 HH PPS REVENUE DEBIT

## 2019-02-15 NOTE — TELEPHONE ENCOUNTER
I returned Roslyns call and told her if patient is' having urinary symptoms' she should be seen by PCP for evaluation and treatment for possible UTI.

## 2019-02-15 NOTE — TELEPHONE ENCOUNTER
Received call from Aaron with [facility] stating she sent a connect care message to Dr. Ml Ruelas yesterday. She states that patient has urinary symptoms and she needs an order for urinalysis.

## 2019-02-16 PROCEDURE — 3331090001 HH PPS REVENUE CREDIT

## 2019-02-16 PROCEDURE — 3331090002 HH PPS REVENUE DEBIT

## 2019-02-17 PROCEDURE — 3331090002 HH PPS REVENUE DEBIT

## 2019-02-17 PROCEDURE — 3331090001 HH PPS REVENUE CREDIT

## 2019-02-18 ENCOUNTER — HOME CARE VISIT (OUTPATIENT)
Dept: SCHEDULING | Facility: HOME HEALTH | Age: 44
End: 2019-02-18
Payer: MEDICARE

## 2019-02-18 VITALS
HEART RATE: 80 BPM | DIASTOLIC BLOOD PRESSURE: 66 MMHG | SYSTOLIC BLOOD PRESSURE: 100 MMHG | RESPIRATION RATE: 18 BRPM | OXYGEN SATURATION: 99 % | TEMPERATURE: 97.9 F

## 2019-02-18 PROCEDURE — 3331090002 HH PPS REVENUE DEBIT

## 2019-02-18 PROCEDURE — G0300 HHS/HOSPICE OF LPN EA 15 MIN: HCPCS

## 2019-02-18 PROCEDURE — 3331090001 HH PPS REVENUE CREDIT

## 2019-02-19 PROCEDURE — 3331090001 HH PPS REVENUE CREDIT

## 2019-02-19 PROCEDURE — 3331090002 HH PPS REVENUE DEBIT

## 2019-02-20 PROCEDURE — 3331090002 HH PPS REVENUE DEBIT

## 2019-02-20 PROCEDURE — 3331090001 HH PPS REVENUE CREDIT

## 2019-02-21 ENCOUNTER — HOME CARE VISIT (OUTPATIENT)
Dept: HOME HEALTH SERVICES | Facility: HOME HEALTH | Age: 44
End: 2019-02-21
Payer: MEDICARE

## 2019-02-21 PROCEDURE — 3331090001 HH PPS REVENUE CREDIT

## 2019-02-21 PROCEDURE — 3331090002 HH PPS REVENUE DEBIT

## 2019-02-22 PROCEDURE — 3331090001 HH PPS REVENUE CREDIT

## 2019-02-22 PROCEDURE — 3331090002 HH PPS REVENUE DEBIT

## 2019-02-23 PROCEDURE — 3331090002 HH PPS REVENUE DEBIT

## 2019-02-23 PROCEDURE — 3331090001 HH PPS REVENUE CREDIT

## 2019-02-24 PROCEDURE — 3331090001 HH PPS REVENUE CREDIT

## 2019-02-24 PROCEDURE — 3331090002 HH PPS REVENUE DEBIT

## 2019-02-25 ENCOUNTER — HOME CARE VISIT (OUTPATIENT)
Dept: SCHEDULING | Facility: HOME HEALTH | Age: 44
End: 2019-02-25
Payer: MEDICARE

## 2019-02-25 VITALS
DIASTOLIC BLOOD PRESSURE: 54 MMHG | SYSTOLIC BLOOD PRESSURE: 92 MMHG | OXYGEN SATURATION: 99 % | WEIGHT: 280 LBS | TEMPERATURE: 97.2 F | HEART RATE: 70 BPM | RESPIRATION RATE: 18 BRPM | BODY MASS INDEX: 48.06 KG/M2

## 2019-02-25 PROCEDURE — 3331090002 HH PPS REVENUE DEBIT

## 2019-02-25 PROCEDURE — 3331090001 HH PPS REVENUE CREDIT

## 2019-02-25 PROCEDURE — G0300 HHS/HOSPICE OF LPN EA 15 MIN: HCPCS

## 2019-02-26 PROCEDURE — 3331090001 HH PPS REVENUE CREDIT

## 2019-02-26 PROCEDURE — 3331090002 HH PPS REVENUE DEBIT

## 2019-02-27 ENCOUNTER — HOME CARE VISIT (OUTPATIENT)
Dept: SCHEDULING | Facility: HOME HEALTH | Age: 44
End: 2019-02-27
Payer: MEDICARE

## 2019-02-27 PROCEDURE — 3331090002 HH PPS REVENUE DEBIT

## 2019-02-27 PROCEDURE — 3331090001 HH PPS REVENUE CREDIT

## 2019-02-27 PROCEDURE — G0300 HHS/HOSPICE OF LPN EA 15 MIN: HCPCS

## 2019-02-28 VITALS
DIASTOLIC BLOOD PRESSURE: 58 MMHG | BODY MASS INDEX: 48.06 KG/M2 | HEART RATE: 85 BPM | WEIGHT: 280 LBS | OXYGEN SATURATION: 99 % | TEMPERATURE: 97.1 F | RESPIRATION RATE: 18 BRPM | SYSTOLIC BLOOD PRESSURE: 90 MMHG

## 2019-02-28 PROCEDURE — 3331090002 HH PPS REVENUE DEBIT

## 2019-02-28 PROCEDURE — 3331090001 HH PPS REVENUE CREDIT

## 2019-03-01 PROCEDURE — 3331090002 HH PPS REVENUE DEBIT

## 2019-03-01 PROCEDURE — 3331090001 HH PPS REVENUE CREDIT

## 2019-03-02 PROCEDURE — 3331090001 HH PPS REVENUE CREDIT

## 2019-03-02 PROCEDURE — 3331090002 HH PPS REVENUE DEBIT

## 2019-03-03 PROCEDURE — 3331090001 HH PPS REVENUE CREDIT

## 2019-03-03 PROCEDURE — 3331090002 HH PPS REVENUE DEBIT

## 2019-03-04 ENCOUNTER — HOME CARE VISIT (OUTPATIENT)
Dept: SCHEDULING | Facility: HOME HEALTH | Age: 44
End: 2019-03-04
Payer: MEDICARE

## 2019-03-04 VITALS
OXYGEN SATURATION: 99 % | RESPIRATION RATE: 16 BRPM | DIASTOLIC BLOOD PRESSURE: 71 MMHG | TEMPERATURE: 98.3 F | HEART RATE: 90 BPM | SYSTOLIC BLOOD PRESSURE: 108 MMHG

## 2019-03-04 PROCEDURE — 3331090001 HH PPS REVENUE CREDIT

## 2019-03-04 PROCEDURE — 3331090002 HH PPS REVENUE DEBIT

## 2019-03-04 PROCEDURE — G0299 HHS/HOSPICE OF RN EA 15 MIN: HCPCS

## 2019-03-05 PROCEDURE — 3331090001 HH PPS REVENUE CREDIT

## 2019-03-05 PROCEDURE — 3331090002 HH PPS REVENUE DEBIT

## 2019-03-06 PROCEDURE — 3331090001 HH PPS REVENUE CREDIT

## 2019-03-06 PROCEDURE — 3331090002 HH PPS REVENUE DEBIT

## 2019-03-07 ENCOUNTER — HOME CARE VISIT (OUTPATIENT)
Dept: SCHEDULING | Facility: HOME HEALTH | Age: 44
End: 2019-03-07
Payer: MEDICARE

## 2019-03-07 PROCEDURE — 3331090001 HH PPS REVENUE CREDIT

## 2019-03-07 PROCEDURE — 3331090002 HH PPS REVENUE DEBIT

## 2019-03-08 PROCEDURE — 3331090002 HH PPS REVENUE DEBIT

## 2019-03-08 PROCEDURE — 3331090001 HH PPS REVENUE CREDIT

## 2019-03-09 PROCEDURE — 3331090001 HH PPS REVENUE CREDIT

## 2019-03-09 PROCEDURE — 3331090002 HH PPS REVENUE DEBIT

## 2019-03-10 PROCEDURE — 3331090001 HH PPS REVENUE CREDIT

## 2019-03-10 PROCEDURE — 3331090002 HH PPS REVENUE DEBIT

## 2019-03-11 ENCOUNTER — HOME CARE VISIT (OUTPATIENT)
Dept: SCHEDULING | Facility: HOME HEALTH | Age: 44
End: 2019-03-11
Payer: MEDICARE

## 2019-03-11 VITALS
DIASTOLIC BLOOD PRESSURE: 68 MMHG | OXYGEN SATURATION: 99 % | HEART RATE: 89 BPM | SYSTOLIC BLOOD PRESSURE: 106 MMHG | RESPIRATION RATE: 18 BRPM | TEMPERATURE: 97.8 F

## 2019-03-11 PROCEDURE — 3331090001 HH PPS REVENUE CREDIT

## 2019-03-11 PROCEDURE — G0300 HHS/HOSPICE OF LPN EA 15 MIN: HCPCS

## 2019-03-11 PROCEDURE — 3331090002 HH PPS REVENUE DEBIT

## 2019-03-12 ENCOUNTER — TELEPHONE (OUTPATIENT)
Dept: SURGERY | Age: 44
End: 2019-03-12

## 2019-03-12 PROCEDURE — 3331090001 HH PPS REVENUE CREDIT

## 2019-03-12 PROCEDURE — 3331090002 HH PPS REVENUE DEBIT

## 2019-03-12 NOTE — TELEPHONE ENCOUNTER
Patient called about [employer] order that was faxed over because she needs it to be signed as soon as possible.

## 2019-03-12 NOTE — TELEPHONE ENCOUNTER
I returned patients call and let her know that Dr. Gadiel Jackson had signed the [employer] order and I had faxed it and and received my confirmation.

## 2019-03-13 PROCEDURE — 3331090002 HH PPS REVENUE DEBIT

## 2019-03-13 PROCEDURE — 3331090001 HH PPS REVENUE CREDIT

## 2019-03-14 ENCOUNTER — HOME CARE VISIT (OUTPATIENT)
Dept: SCHEDULING | Facility: HOME HEALTH | Age: 44
End: 2019-03-14
Payer: MEDICARE

## 2019-03-14 VITALS
RESPIRATION RATE: 18 BRPM | SYSTOLIC BLOOD PRESSURE: 92 MMHG | DIASTOLIC BLOOD PRESSURE: 60 MMHG | TEMPERATURE: 97.7 F | HEART RATE: 90 BPM | OXYGEN SATURATION: 98 %

## 2019-03-14 PROCEDURE — G0300 HHS/HOSPICE OF LPN EA 15 MIN: HCPCS

## 2019-03-14 PROCEDURE — 3331090002 HH PPS REVENUE DEBIT

## 2019-03-14 PROCEDURE — 3331090001 HH PPS REVENUE CREDIT

## 2019-03-15 PROCEDURE — 3331090002 HH PPS REVENUE DEBIT

## 2019-03-15 PROCEDURE — 3331090001 HH PPS REVENUE CREDIT

## 2019-03-16 PROCEDURE — 3331090001 HH PPS REVENUE CREDIT

## 2019-03-16 PROCEDURE — 3331090002 HH PPS REVENUE DEBIT

## 2019-03-17 PROCEDURE — 3331090001 HH PPS REVENUE CREDIT

## 2019-03-17 PROCEDURE — 3331090002 HH PPS REVENUE DEBIT

## 2019-03-18 ENCOUNTER — HOME CARE VISIT (OUTPATIENT)
Dept: SCHEDULING | Facility: HOME HEALTH | Age: 44
End: 2019-03-18
Payer: MEDICARE

## 2019-03-18 PROCEDURE — G0299 HHS/HOSPICE OF RN EA 15 MIN: HCPCS

## 2019-03-18 PROCEDURE — 3331090001 HH PPS REVENUE CREDIT

## 2019-03-18 PROCEDURE — 3331090002 HH PPS REVENUE DEBIT

## 2019-03-19 ENCOUNTER — HOME CARE VISIT (OUTPATIENT)
Dept: HOME HEALTH SERVICES | Facility: HOME HEALTH | Age: 44
End: 2019-03-19
Payer: MEDICARE

## 2019-03-19 VITALS
OXYGEN SATURATION: 98 % | TEMPERATURE: 97.8 F | DIASTOLIC BLOOD PRESSURE: 80 MMHG | HEART RATE: 70 BPM | SYSTOLIC BLOOD PRESSURE: 134 MMHG | RESPIRATION RATE: 16 BRPM

## 2019-03-19 PROCEDURE — 3331090001 HH PPS REVENUE CREDIT

## 2019-03-19 PROCEDURE — 3331090002 HH PPS REVENUE DEBIT

## 2019-03-19 PROCEDURE — 3331090003 HH PPS REVENUE ADJ

## 2019-03-20 PROCEDURE — 3331090001 HH PPS REVENUE CREDIT

## 2019-03-20 PROCEDURE — 3331090002 HH PPS REVENUE DEBIT

## 2019-03-21 ENCOUNTER — HOME CARE VISIT (OUTPATIENT)
Dept: SCHEDULING | Facility: HOME HEALTH | Age: 44
End: 2019-03-21
Payer: MEDICARE

## 2019-03-21 VITALS
TEMPERATURE: 97 F | HEART RATE: 100 BPM | OXYGEN SATURATION: 97 % | DIASTOLIC BLOOD PRESSURE: 60 MMHG | RESPIRATION RATE: 18 BRPM | SYSTOLIC BLOOD PRESSURE: 98 MMHG

## 2019-03-21 PROCEDURE — 3331090002 HH PPS REVENUE DEBIT

## 2019-03-21 PROCEDURE — 3331090001 HH PPS REVENUE CREDIT

## 2019-03-21 PROCEDURE — G0300 HHS/HOSPICE OF LPN EA 15 MIN: HCPCS

## 2019-03-21 PROCEDURE — 400014 HH F/U

## 2019-03-22 PROCEDURE — 3331090001 HH PPS REVENUE CREDIT

## 2019-03-22 PROCEDURE — 3331090002 HH PPS REVENUE DEBIT

## 2019-03-23 PROCEDURE — 3331090002 HH PPS REVENUE DEBIT

## 2019-03-23 PROCEDURE — 3331090001 HH PPS REVENUE CREDIT

## 2019-03-24 PROCEDURE — 3331090001 HH PPS REVENUE CREDIT

## 2019-03-24 PROCEDURE — 3331090002 HH PPS REVENUE DEBIT

## 2019-03-25 ENCOUNTER — HOME CARE VISIT (OUTPATIENT)
Dept: SCHEDULING | Facility: HOME HEALTH | Age: 44
End: 2019-03-25
Payer: MEDICARE

## 2019-03-25 VITALS
DIASTOLIC BLOOD PRESSURE: 80 MMHG | RESPIRATION RATE: 20 BRPM | TEMPERATURE: 98.9 F | HEART RATE: 80 BPM | OXYGEN SATURATION: 98 % | SYSTOLIC BLOOD PRESSURE: 120 MMHG

## 2019-03-25 PROCEDURE — 3331090002 HH PPS REVENUE DEBIT

## 2019-03-25 PROCEDURE — 3331090001 HH PPS REVENUE CREDIT

## 2019-03-25 PROCEDURE — G0300 HHS/HOSPICE OF LPN EA 15 MIN: HCPCS

## 2019-03-26 PROCEDURE — 3331090002 HH PPS REVENUE DEBIT

## 2019-03-26 PROCEDURE — 3331090001 HH PPS REVENUE CREDIT

## 2019-03-27 ENCOUNTER — TELEPHONE (OUTPATIENT)
Dept: SURGERY | Age: 44
End: 2019-03-27

## 2019-03-27 PROCEDURE — 3331090001 HH PPS REVENUE CREDIT

## 2019-03-27 PROCEDURE — 3331090002 HH PPS REVENUE DEBIT

## 2019-03-27 NOTE — TELEPHONE ENCOUNTER
Received call from ΠΑΦΟΣ, dietician with [facility]. She states she follows patient for tpn. Patient's mom advised her that patient had a fever this past weekend and that her white blood cells are elevated. She'd like to know if Dr. Gadiel Jackson would be ok with a blood culture. Please call her.

## 2019-03-27 NOTE — TELEPHONE ENCOUNTER
I returned call from [place] with [address]. She is asking for an order for blood cultures be drawn because the patients mother told her patient had a fever over the weekend and has an elevated WBC. She states patient is seeing a GI doctor who started the patient on Insidio (?)Infusions for her Crohns. I suggested she let the GI doctor know about patients symptoms as well as patients PCP. I told her I had spoken with Dr. Luz Elena Davila and he ok'ed blood culture to be drawn.

## 2019-03-28 ENCOUNTER — HOME CARE VISIT (OUTPATIENT)
Dept: SCHEDULING | Facility: HOME HEALTH | Age: 44
End: 2019-03-28
Payer: MEDICARE

## 2019-03-28 VITALS
WEIGHT: 282 LBS | RESPIRATION RATE: 18 BRPM | TEMPERATURE: 97 F | OXYGEN SATURATION: 99 % | HEART RATE: 70 BPM | SYSTOLIC BLOOD PRESSURE: 90 MMHG | BODY MASS INDEX: 48.41 KG/M2 | DIASTOLIC BLOOD PRESSURE: 60 MMHG

## 2019-03-28 PROCEDURE — 3331090001 HH PPS REVENUE CREDIT

## 2019-03-28 PROCEDURE — G0300 HHS/HOSPICE OF LPN EA 15 MIN: HCPCS

## 2019-03-28 PROCEDURE — 3331090002 HH PPS REVENUE DEBIT

## 2019-03-29 PROCEDURE — 3331090002 HH PPS REVENUE DEBIT

## 2019-03-29 PROCEDURE — 3331090001 HH PPS REVENUE CREDIT

## 2019-03-30 PROCEDURE — 3331090001 HH PPS REVENUE CREDIT

## 2019-03-30 PROCEDURE — 3331090002 HH PPS REVENUE DEBIT

## 2019-03-31 PROCEDURE — 3331090001 HH PPS REVENUE CREDIT

## 2019-03-31 PROCEDURE — 3331090002 HH PPS REVENUE DEBIT

## 2019-04-01 ENCOUNTER — HOME CARE VISIT (OUTPATIENT)
Dept: SCHEDULING | Facility: HOME HEALTH | Age: 44
End: 2019-04-01
Payer: MEDICARE

## 2019-04-01 VITALS
BODY MASS INDEX: 48.1 KG/M2 | OXYGEN SATURATION: 98 % | HEART RATE: 90 BPM | WEIGHT: 280.2 LBS | DIASTOLIC BLOOD PRESSURE: 66 MMHG | SYSTOLIC BLOOD PRESSURE: 102 MMHG | TEMPERATURE: 97.6 F | RESPIRATION RATE: 18 BRPM

## 2019-04-01 PROCEDURE — 3331090001 HH PPS REVENUE CREDIT

## 2019-04-01 PROCEDURE — G0300 HHS/HOSPICE OF LPN EA 15 MIN: HCPCS

## 2019-04-01 PROCEDURE — 3331090002 HH PPS REVENUE DEBIT

## 2019-04-02 PROCEDURE — 3331090002 HH PPS REVENUE DEBIT

## 2019-04-02 PROCEDURE — 3331090001 HH PPS REVENUE CREDIT

## 2019-04-03 PROCEDURE — 3331090001 HH PPS REVENUE CREDIT

## 2019-04-03 PROCEDURE — 3331090002 HH PPS REVENUE DEBIT

## 2019-04-04 ENCOUNTER — HOME CARE VISIT (OUTPATIENT)
Dept: SCHEDULING | Facility: HOME HEALTH | Age: 44
End: 2019-04-04
Payer: MEDICARE

## 2019-04-04 VITALS
TEMPERATURE: 97.5 F | RESPIRATION RATE: 18 BRPM | HEART RATE: 70 BPM | OXYGEN SATURATION: 98 % | DIASTOLIC BLOOD PRESSURE: 64 MMHG | SYSTOLIC BLOOD PRESSURE: 100 MMHG

## 2019-04-04 PROCEDURE — G0300 HHS/HOSPICE OF LPN EA 15 MIN: HCPCS

## 2019-04-04 PROCEDURE — 3331090002 HH PPS REVENUE DEBIT

## 2019-04-04 PROCEDURE — 3331090001 HH PPS REVENUE CREDIT

## 2019-04-05 PROCEDURE — 3331090001 HH PPS REVENUE CREDIT

## 2019-04-05 PROCEDURE — 3331090002 HH PPS REVENUE DEBIT

## 2019-04-06 PROCEDURE — 3331090002 HH PPS REVENUE DEBIT

## 2019-04-06 PROCEDURE — 3331090001 HH PPS REVENUE CREDIT

## 2019-04-07 PROCEDURE — 3331090002 HH PPS REVENUE DEBIT

## 2019-04-07 PROCEDURE — 3331090001 HH PPS REVENUE CREDIT

## 2019-04-08 ENCOUNTER — HOME CARE VISIT (OUTPATIENT)
Dept: SCHEDULING | Facility: HOME HEALTH | Age: 44
End: 2019-04-08
Payer: MEDICARE

## 2019-04-08 VITALS
HEART RATE: 87 BPM | SYSTOLIC BLOOD PRESSURE: 100 MMHG | TEMPERATURE: 97.9 F | RESPIRATION RATE: 18 BRPM | DIASTOLIC BLOOD PRESSURE: 64 MMHG | OXYGEN SATURATION: 98 %

## 2019-04-08 PROCEDURE — 3331090001 HH PPS REVENUE CREDIT

## 2019-04-08 PROCEDURE — G0300 HHS/HOSPICE OF LPN EA 15 MIN: HCPCS

## 2019-04-08 PROCEDURE — 3331090002 HH PPS REVENUE DEBIT

## 2019-04-09 PROCEDURE — 3331090001 HH PPS REVENUE CREDIT

## 2019-04-09 PROCEDURE — 3331090002 HH PPS REVENUE DEBIT

## 2019-04-10 PROCEDURE — 3331090001 HH PPS REVENUE CREDIT

## 2019-04-10 PROCEDURE — 3331090002 HH PPS REVENUE DEBIT

## 2019-04-11 ENCOUNTER — HOME CARE VISIT (OUTPATIENT)
Dept: SCHEDULING | Facility: HOME HEALTH | Age: 44
End: 2019-04-11
Payer: MEDICARE

## 2019-04-11 VITALS
DIASTOLIC BLOOD PRESSURE: 60 MMHG | RESPIRATION RATE: 16 BRPM | TEMPERATURE: 98.6 F | HEART RATE: 79 BPM | OXYGEN SATURATION: 99 % | SYSTOLIC BLOOD PRESSURE: 85 MMHG

## 2019-04-11 PROCEDURE — G0299 HHS/HOSPICE OF RN EA 15 MIN: HCPCS

## 2019-04-11 PROCEDURE — 3331090002 HH PPS REVENUE DEBIT

## 2019-04-11 PROCEDURE — 3331090001 HH PPS REVENUE CREDIT

## 2019-04-12 PROCEDURE — 3331090001 HH PPS REVENUE CREDIT

## 2019-04-12 PROCEDURE — 3331090002 HH PPS REVENUE DEBIT

## 2019-04-13 PROCEDURE — 3331090001 HH PPS REVENUE CREDIT

## 2019-04-13 PROCEDURE — 3331090002 HH PPS REVENUE DEBIT

## 2019-04-14 PROCEDURE — 3331090001 HH PPS REVENUE CREDIT

## 2019-04-14 PROCEDURE — 3331090002 HH PPS REVENUE DEBIT

## 2019-04-15 ENCOUNTER — HOME CARE VISIT (OUTPATIENT)
Dept: SCHEDULING | Facility: HOME HEALTH | Age: 44
End: 2019-04-15
Payer: MEDICARE

## 2019-04-15 VITALS
RESPIRATION RATE: 18 BRPM | HEART RATE: 84 BPM | TEMPERATURE: 97.1 F | SYSTOLIC BLOOD PRESSURE: 90 MMHG | OXYGEN SATURATION: 99 % | DIASTOLIC BLOOD PRESSURE: 54 MMHG

## 2019-04-15 PROCEDURE — G0300 HHS/HOSPICE OF LPN EA 15 MIN: HCPCS

## 2019-04-15 PROCEDURE — 3331090001 HH PPS REVENUE CREDIT

## 2019-04-15 PROCEDURE — 3331090002 HH PPS REVENUE DEBIT

## 2019-04-16 ENCOUNTER — TELEPHONE (OUTPATIENT)
Dept: SURGERY | Age: 44
End: 2019-04-16

## 2019-04-16 PROCEDURE — 3331090002 HH PPS REVENUE DEBIT

## 2019-04-16 PROCEDURE — 3331090001 HH PPS REVENUE CREDIT

## 2019-04-16 NOTE — TELEPHONE ENCOUNTER
Patients mother was calling just to let us know that she put an order in for more bags for her Hawk pouch and she just wanted the nurse to be aware incase the home healthcare calls us. Also stated daughter hurt her back and hasn't been able to walk and once she is able to again she will be calling to make appointment for her.

## 2019-04-17 PROCEDURE — 3331090001 HH PPS REVENUE CREDIT

## 2019-04-17 PROCEDURE — 3331090002 HH PPS REVENUE DEBIT

## 2019-04-18 ENCOUNTER — HOME CARE VISIT (OUTPATIENT)
Dept: SCHEDULING | Facility: HOME HEALTH | Age: 44
End: 2019-04-18
Payer: MEDICARE

## 2019-04-18 PROCEDURE — 3331090001 HH PPS REVENUE CREDIT

## 2019-04-18 PROCEDURE — 3331090002 HH PPS REVENUE DEBIT

## 2019-04-18 PROCEDURE — G0300 HHS/HOSPICE OF LPN EA 15 MIN: HCPCS

## 2019-04-18 NOTE — TELEPHONE ENCOUNTER
Patient's mother called stating she wanted to verify that [employer] has contacted the office regarding orders for the patient.

## 2019-04-19 PROCEDURE — 3331090001 HH PPS REVENUE CREDIT

## 2019-04-19 PROCEDURE — 3331090002 HH PPS REVENUE DEBIT

## 2019-04-19 NOTE — TELEPHONE ENCOUNTER
I returned patients mothers call and told I have the order from [employer] for patients supplies and would leave it on Dr. Brady shankar for signature. Would probably be faxed out Monday.

## 2019-04-20 PROCEDURE — 3331090001 HH PPS REVENUE CREDIT

## 2019-04-20 PROCEDURE — 3331090002 HH PPS REVENUE DEBIT

## 2019-04-21 VITALS
SYSTOLIC BLOOD PRESSURE: 102 MMHG | TEMPERATURE: 97.9 F | DIASTOLIC BLOOD PRESSURE: 64 MMHG | HEART RATE: 87 BPM | RESPIRATION RATE: 18 BRPM | OXYGEN SATURATION: 99 %

## 2019-04-21 PROCEDURE — 3331090001 HH PPS REVENUE CREDIT

## 2019-04-21 PROCEDURE — 3331090002 HH PPS REVENUE DEBIT

## 2019-04-22 ENCOUNTER — HOME CARE VISIT (OUTPATIENT)
Dept: SCHEDULING | Facility: HOME HEALTH | Age: 44
End: 2019-04-22
Payer: MEDICARE

## 2019-04-22 VITALS
TEMPERATURE: 97 F | WEIGHT: 268 LBS | BODY MASS INDEX: 46 KG/M2 | HEART RATE: 66 BPM | DIASTOLIC BLOOD PRESSURE: 66 MMHG | OXYGEN SATURATION: 99 % | SYSTOLIC BLOOD PRESSURE: 100 MMHG | RESPIRATION RATE: 18 BRPM

## 2019-04-22 PROCEDURE — 3331090001 HH PPS REVENUE CREDIT

## 2019-04-22 PROCEDURE — 3331090002 HH PPS REVENUE DEBIT

## 2019-04-22 PROCEDURE — G0300 HHS/HOSPICE OF LPN EA 15 MIN: HCPCS

## 2019-04-23 PROCEDURE — 3331090002 HH PPS REVENUE DEBIT

## 2019-04-23 PROCEDURE — 3331090001 HH PPS REVENUE CREDIT

## 2019-04-24 PROCEDURE — 3331090002 HH PPS REVENUE DEBIT

## 2019-04-24 PROCEDURE — 3331090001 HH PPS REVENUE CREDIT

## 2019-04-25 ENCOUNTER — HOME CARE VISIT (OUTPATIENT)
Dept: SCHEDULING | Facility: HOME HEALTH | Age: 44
End: 2019-04-25
Payer: MEDICARE

## 2019-04-25 PROCEDURE — 3331090001 HH PPS REVENUE CREDIT

## 2019-04-25 PROCEDURE — 3331090002 HH PPS REVENUE DEBIT

## 2019-04-26 PROCEDURE — 3331090001 HH PPS REVENUE CREDIT

## 2019-04-26 PROCEDURE — 3331090002 HH PPS REVENUE DEBIT

## 2019-04-27 PROCEDURE — 3331090002 HH PPS REVENUE DEBIT

## 2019-04-27 PROCEDURE — 3331090001 HH PPS REVENUE CREDIT

## 2019-04-28 PROCEDURE — 3331090002 HH PPS REVENUE DEBIT

## 2019-04-28 PROCEDURE — 3331090001 HH PPS REVENUE CREDIT

## 2019-04-29 ENCOUNTER — HOME CARE VISIT (OUTPATIENT)
Dept: SCHEDULING | Facility: HOME HEALTH | Age: 44
End: 2019-04-29
Payer: MEDICARE

## 2019-04-29 VITALS
SYSTOLIC BLOOD PRESSURE: 100 MMHG | DIASTOLIC BLOOD PRESSURE: 60 MMHG | OXYGEN SATURATION: 99 % | TEMPERATURE: 97.8 F | HEART RATE: 71 BPM | RESPIRATION RATE: 18 BRPM

## 2019-04-29 PROCEDURE — G0300 HHS/HOSPICE OF LPN EA 15 MIN: HCPCS

## 2019-04-29 PROCEDURE — 3331090002 HH PPS REVENUE DEBIT

## 2019-04-29 PROCEDURE — 3331090001 HH PPS REVENUE CREDIT

## 2019-04-30 ENCOUNTER — TELEPHONE (OUTPATIENT)
Dept: SURGERY | Age: 44
End: 2019-04-30

## 2019-04-30 PROCEDURE — 3331090001 HH PPS REVENUE CREDIT

## 2019-04-30 PROCEDURE — 3331090002 HH PPS REVENUE DEBIT

## 2019-04-30 NOTE — TELEPHONE ENCOUNTER
Jennifer Thomas from  [facility] calling to see if there was an order received for Dr Damián Maddox for a plan of care for the patient, it had been faxed over a couple of times. Please call her.

## 2019-05-01 PROCEDURE — 3331090002 HH PPS REVENUE DEBIT

## 2019-05-01 PROCEDURE — 3331090001 HH PPS REVENUE CREDIT

## 2019-05-01 NOTE — TELEPHONE ENCOUNTER
I returned nurse Jessica call and told her that Dr. Tammy Lopez was out of the office for the next 2 weeks and I would set aside the orders for him to sign when he got back.

## 2019-05-02 ENCOUNTER — HOME CARE VISIT (OUTPATIENT)
Dept: HOME HEALTH SERVICES | Facility: HOME HEALTH | Age: 44
End: 2019-05-02
Payer: MEDICARE

## 2019-05-02 PROCEDURE — 3331090001 HH PPS REVENUE CREDIT

## 2019-05-02 PROCEDURE — 3331090002 HH PPS REVENUE DEBIT

## 2019-05-03 PROCEDURE — 3331090001 HH PPS REVENUE CREDIT

## 2019-05-03 PROCEDURE — 3331090002 HH PPS REVENUE DEBIT

## 2019-05-04 PROCEDURE — 3331090001 HH PPS REVENUE CREDIT

## 2019-05-04 PROCEDURE — 3331090002 HH PPS REVENUE DEBIT

## 2019-05-05 PROCEDURE — 3331090002 HH PPS REVENUE DEBIT

## 2019-05-05 PROCEDURE — 3331090001 HH PPS REVENUE CREDIT

## 2019-05-06 ENCOUNTER — HOME CARE VISIT (OUTPATIENT)
Dept: SCHEDULING | Facility: HOME HEALTH | Age: 44
End: 2019-05-06
Payer: MEDICARE

## 2019-05-06 VITALS
TEMPERATURE: 97.9 F | OXYGEN SATURATION: 98 % | RESPIRATION RATE: 18 BRPM | SYSTOLIC BLOOD PRESSURE: 100 MMHG | HEART RATE: 80 BPM | DIASTOLIC BLOOD PRESSURE: 56 MMHG

## 2019-05-06 PROCEDURE — 3331090002 HH PPS REVENUE DEBIT

## 2019-05-06 PROCEDURE — G0300 HHS/HOSPICE OF LPN EA 15 MIN: HCPCS

## 2019-05-06 PROCEDURE — 3331090001 HH PPS REVENUE CREDIT

## 2019-05-07 ENCOUNTER — HOME CARE VISIT (OUTPATIENT)
Dept: SCHEDULING | Facility: HOME HEALTH | Age: 44
End: 2019-05-07
Payer: MEDICARE

## 2019-05-07 PROCEDURE — G0300 HHS/HOSPICE OF LPN EA 15 MIN: HCPCS

## 2019-05-07 PROCEDURE — 3331090001 HH PPS REVENUE CREDIT

## 2019-05-07 PROCEDURE — 3331090002 HH PPS REVENUE DEBIT

## 2019-05-08 PROCEDURE — 3331090001 HH PPS REVENUE CREDIT

## 2019-05-08 PROCEDURE — 3331090002 HH PPS REVENUE DEBIT

## 2019-05-09 ENCOUNTER — HOME CARE VISIT (OUTPATIENT)
Dept: SCHEDULING | Facility: HOME HEALTH | Age: 44
End: 2019-05-09
Payer: MEDICARE

## 2019-05-09 VITALS
DIASTOLIC BLOOD PRESSURE: 64 MMHG | OXYGEN SATURATION: 99 % | HEART RATE: 89 BPM | TEMPERATURE: 97.7 F | SYSTOLIC BLOOD PRESSURE: 102 MMHG | RESPIRATION RATE: 18 BRPM

## 2019-05-09 PROCEDURE — G0300 HHS/HOSPICE OF LPN EA 15 MIN: HCPCS

## 2019-05-09 PROCEDURE — 3331090002 HH PPS REVENUE DEBIT

## 2019-05-09 PROCEDURE — 3331090001 HH PPS REVENUE CREDIT

## 2019-05-10 PROCEDURE — 3331090002 HH PPS REVENUE DEBIT

## 2019-05-10 PROCEDURE — 3331090001 HH PPS REVENUE CREDIT

## 2019-05-11 PROCEDURE — 3331090002 HH PPS REVENUE DEBIT

## 2019-05-11 PROCEDURE — 3331090001 HH PPS REVENUE CREDIT

## 2019-05-12 VITALS
RESPIRATION RATE: 18 BRPM | SYSTOLIC BLOOD PRESSURE: 92 MMHG | DIASTOLIC BLOOD PRESSURE: 50 MMHG | TEMPERATURE: 97.9 F | OXYGEN SATURATION: 98 % | HEART RATE: 75 BPM

## 2019-05-12 PROCEDURE — 3331090001 HH PPS REVENUE CREDIT

## 2019-05-12 PROCEDURE — 3331090002 HH PPS REVENUE DEBIT

## 2019-05-13 ENCOUNTER — HOME CARE VISIT (OUTPATIENT)
Dept: SCHEDULING | Facility: HOME HEALTH | Age: 44
End: 2019-05-13
Payer: MEDICARE

## 2019-05-13 VITALS
SYSTOLIC BLOOD PRESSURE: 98 MMHG | RESPIRATION RATE: 18 BRPM | HEART RATE: 72 BPM | DIASTOLIC BLOOD PRESSURE: 62 MMHG | TEMPERATURE: 97.8 F | OXYGEN SATURATION: 98 %

## 2019-05-13 PROCEDURE — 3331090002 HH PPS REVENUE DEBIT

## 2019-05-13 PROCEDURE — 3331090001 HH PPS REVENUE CREDIT

## 2019-05-13 PROCEDURE — G0300 HHS/HOSPICE OF LPN EA 15 MIN: HCPCS

## 2019-05-14 ENCOUNTER — HOME CARE VISIT (OUTPATIENT)
Dept: HOME HEALTH SERVICES | Facility: HOME HEALTH | Age: 44
End: 2019-05-14
Payer: MEDICARE

## 2019-05-14 PROCEDURE — 3331090002 HH PPS REVENUE DEBIT

## 2019-05-14 PROCEDURE — 3331090001 HH PPS REVENUE CREDIT

## 2019-05-15 ENCOUNTER — HOME CARE VISIT (OUTPATIENT)
Dept: SCHEDULING | Facility: HOME HEALTH | Age: 44
End: 2019-05-15
Payer: MEDICARE

## 2019-05-15 PROCEDURE — 3331090001 HH PPS REVENUE CREDIT

## 2019-05-15 PROCEDURE — G0300 HHS/HOSPICE OF LPN EA 15 MIN: HCPCS

## 2019-05-15 PROCEDURE — 3331090002 HH PPS REVENUE DEBIT

## 2019-05-16 PROCEDURE — 3331090001 HH PPS REVENUE CREDIT

## 2019-05-16 PROCEDURE — 3331090002 HH PPS REVENUE DEBIT

## 2019-05-17 ENCOUNTER — HOME CARE VISIT (OUTPATIENT)
Dept: SCHEDULING | Facility: HOME HEALTH | Age: 44
End: 2019-05-17
Payer: MEDICARE

## 2019-05-17 VITALS
TEMPERATURE: 97.7 F | HEART RATE: 88 BPM | DIASTOLIC BLOOD PRESSURE: 64 MMHG | RESPIRATION RATE: 18 BRPM | SYSTOLIC BLOOD PRESSURE: 104 MMHG | OXYGEN SATURATION: 99 %

## 2019-05-17 PROCEDURE — 3331090001 HH PPS REVENUE CREDIT

## 2019-05-17 PROCEDURE — G0299 HHS/HOSPICE OF RN EA 15 MIN: HCPCS

## 2019-05-17 PROCEDURE — 3331090002 HH PPS REVENUE DEBIT

## 2019-05-18 PROCEDURE — 3331090001 HH PPS REVENUE CREDIT

## 2019-05-18 PROCEDURE — 3331090003 HH PPS REVENUE ADJ

## 2019-05-18 PROCEDURE — 3331090002 HH PPS REVENUE DEBIT

## 2019-05-19 VITALS
SYSTOLIC BLOOD PRESSURE: 90 MMHG | HEART RATE: 56 BPM | DIASTOLIC BLOOD PRESSURE: 55 MMHG | RESPIRATION RATE: 18 BRPM | OXYGEN SATURATION: 99 %

## 2019-05-19 PROCEDURE — 3331090001 HH PPS REVENUE CREDIT

## 2019-05-19 PROCEDURE — 3331090002 HH PPS REVENUE DEBIT

## 2019-05-20 ENCOUNTER — TELEPHONE (OUTPATIENT)
Dept: SURGERY | Age: 44
End: 2019-05-20

## 2019-05-20 ENCOUNTER — HOME CARE VISIT (OUTPATIENT)
Dept: SCHEDULING | Facility: HOME HEALTH | Age: 44
End: 2019-05-20
Payer: MEDICARE

## 2019-05-20 VITALS
RESPIRATION RATE: 18 BRPM | HEART RATE: 87 BPM | OXYGEN SATURATION: 98 % | TEMPERATURE: 97.7 F | DIASTOLIC BLOOD PRESSURE: 68 MMHG | SYSTOLIC BLOOD PRESSURE: 102 MMHG

## 2019-05-20 PROCEDURE — 3331090001 HH PPS REVENUE CREDIT

## 2019-05-20 PROCEDURE — G0300 HHS/HOSPICE OF LPN EA 15 MIN: HCPCS

## 2019-05-20 PROCEDURE — 400014 HH F/U

## 2019-05-20 PROCEDURE — 3331090002 HH PPS REVENUE DEBIT

## 2019-05-21 ENCOUNTER — TELEPHONE (OUTPATIENT)
Dept: SURGERY | Age: 44
End: 2019-05-21

## 2019-05-21 ENCOUNTER — HOME CARE VISIT (OUTPATIENT)
Dept: SCHEDULING | Facility: HOME HEALTH | Age: 44
End: 2019-05-21
Payer: MEDICARE

## 2019-05-21 VITALS
RESPIRATION RATE: 14 BRPM | TEMPERATURE: 97 F | OXYGEN SATURATION: 98 % | SYSTOLIC BLOOD PRESSURE: 105 MMHG | HEART RATE: 80 BPM | DIASTOLIC BLOOD PRESSURE: 76 MMHG

## 2019-05-21 PROCEDURE — 3331090001 HH PPS REVENUE CREDIT

## 2019-05-21 PROCEDURE — 3331090002 HH PPS REVENUE DEBIT

## 2019-05-21 PROCEDURE — G0151 HHCP-SERV OF PT,EA 15 MIN: HCPCS

## 2019-05-21 NOTE — TELEPHONE ENCOUNTER
I returned patients mothers call and let her know that [employer] orders had been signed and faxed and confirmation received.

## 2019-05-21 NOTE — TELEPHONE ENCOUNTER
[employer] sent a request for medical supplies on 5/8 and states they have not heard back. Maureen Leone was following up.  Fax number is [phone] &  PO number for order is: [id number]

## 2019-05-22 PROCEDURE — 3331090002 HH PPS REVENUE DEBIT

## 2019-05-22 PROCEDURE — 3331090001 HH PPS REVENUE CREDIT

## 2019-05-23 ENCOUNTER — HOME CARE VISIT (OUTPATIENT)
Dept: SCHEDULING | Facility: HOME HEALTH | Age: 44
End: 2019-05-23
Payer: MEDICARE

## 2019-05-23 VITALS
DIASTOLIC BLOOD PRESSURE: 56 MMHG | HEART RATE: 69 BPM | RESPIRATION RATE: 18 BRPM | OXYGEN SATURATION: 98 % | SYSTOLIC BLOOD PRESSURE: 100 MMHG | TEMPERATURE: 97.8 F

## 2019-05-23 PROCEDURE — 3331090001 HH PPS REVENUE CREDIT

## 2019-05-23 PROCEDURE — 3331090002 HH PPS REVENUE DEBIT

## 2019-05-23 PROCEDURE — G0300 HHS/HOSPICE OF LPN EA 15 MIN: HCPCS

## 2019-05-24 ENCOUNTER — HOME CARE VISIT (OUTPATIENT)
Dept: SCHEDULING | Facility: HOME HEALTH | Age: 44
End: 2019-05-24
Payer: MEDICARE

## 2019-05-24 VITALS
TEMPERATURE: 97.7 F | OXYGEN SATURATION: 98 % | RESPIRATION RATE: 16 BRPM | DIASTOLIC BLOOD PRESSURE: 57 MMHG | HEART RATE: 96 BPM | SYSTOLIC BLOOD PRESSURE: 87 MMHG

## 2019-05-24 PROCEDURE — 3331090002 HH PPS REVENUE DEBIT

## 2019-05-24 PROCEDURE — 3331090001 HH PPS REVENUE CREDIT

## 2019-05-24 PROCEDURE — G0157 HHC PT ASSISTANT EA 15: HCPCS

## 2019-05-25 PROCEDURE — 3331090002 HH PPS REVENUE DEBIT

## 2019-05-25 PROCEDURE — 3331090001 HH PPS REVENUE CREDIT

## 2019-05-26 ENCOUNTER — HOME CARE VISIT (OUTPATIENT)
Dept: SCHEDULING | Facility: HOME HEALTH | Age: 44
End: 2019-05-26
Payer: MEDICARE

## 2019-05-26 VITALS
DIASTOLIC BLOOD PRESSURE: 70 MMHG | SYSTOLIC BLOOD PRESSURE: 104 MMHG | HEART RATE: 81 BPM | OXYGEN SATURATION: 98 % | TEMPERATURE: 97 F | RESPIRATION RATE: 18 BRPM

## 2019-05-26 PROCEDURE — 3331090002 HH PPS REVENUE DEBIT

## 2019-05-26 PROCEDURE — 3331090001 HH PPS REVENUE CREDIT

## 2019-05-26 PROCEDURE — G0300 HHS/HOSPICE OF LPN EA 15 MIN: HCPCS

## 2019-05-27 PROCEDURE — 3331090001 HH PPS REVENUE CREDIT

## 2019-05-27 PROCEDURE — 3331090002 HH PPS REVENUE DEBIT

## 2019-05-28 PROCEDURE — 3331090001 HH PPS REVENUE CREDIT

## 2019-05-28 PROCEDURE — 3331090002 HH PPS REVENUE DEBIT

## 2019-05-29 ENCOUNTER — HOME CARE VISIT (OUTPATIENT)
Dept: SCHEDULING | Facility: HOME HEALTH | Age: 44
End: 2019-05-29
Payer: MEDICARE

## 2019-05-29 PROCEDURE — 3331090001 HH PPS REVENUE CREDIT

## 2019-05-29 PROCEDURE — 3331090002 HH PPS REVENUE DEBIT

## 2019-05-29 PROCEDURE — G0157 HHC PT ASSISTANT EA 15: HCPCS

## 2019-05-30 ENCOUNTER — HOME CARE VISIT (OUTPATIENT)
Dept: SCHEDULING | Facility: HOME HEALTH | Age: 44
End: 2019-05-30
Payer: MEDICARE

## 2019-05-30 VITALS
HEART RATE: 87 BPM | RESPIRATION RATE: 18 BRPM | OXYGEN SATURATION: 99 % | SYSTOLIC BLOOD PRESSURE: 122 MMHG | TEMPERATURE: 97.2 F | DIASTOLIC BLOOD PRESSURE: 80 MMHG

## 2019-05-30 PROCEDURE — 3331090002 HH PPS REVENUE DEBIT

## 2019-05-30 PROCEDURE — G0300 HHS/HOSPICE OF LPN EA 15 MIN: HCPCS

## 2019-05-30 PROCEDURE — 3331090001 HH PPS REVENUE CREDIT

## 2019-05-31 ENCOUNTER — HOME CARE VISIT (OUTPATIENT)
Dept: SCHEDULING | Facility: HOME HEALTH | Age: 44
End: 2019-05-31
Payer: MEDICARE

## 2019-05-31 VITALS — HEART RATE: 92 BPM | SYSTOLIC BLOOD PRESSURE: 98 MMHG | DIASTOLIC BLOOD PRESSURE: 58 MMHG

## 2019-05-31 PROCEDURE — G0157 HHC PT ASSISTANT EA 15: HCPCS

## 2019-05-31 PROCEDURE — 3331090002 HH PPS REVENUE DEBIT

## 2019-05-31 PROCEDURE — 3331090001 HH PPS REVENUE CREDIT

## 2019-06-01 PROCEDURE — 3331090001 HH PPS REVENUE CREDIT

## 2019-06-01 PROCEDURE — 3331090002 HH PPS REVENUE DEBIT

## 2019-06-02 PROCEDURE — 3331090002 HH PPS REVENUE DEBIT

## 2019-06-02 PROCEDURE — 3331090001 HH PPS REVENUE CREDIT

## 2019-06-03 ENCOUNTER — HOME CARE VISIT (OUTPATIENT)
Dept: SCHEDULING | Facility: HOME HEALTH | Age: 44
End: 2019-06-03
Payer: MEDICARE

## 2019-06-03 VITALS
TEMPERATURE: 97.6 F | OXYGEN SATURATION: 98 % | SYSTOLIC BLOOD PRESSURE: 90 MMHG | HEART RATE: 76 BPM | DIASTOLIC BLOOD PRESSURE: 52 MMHG | RESPIRATION RATE: 18 BRPM

## 2019-06-03 PROCEDURE — 3331090001 HH PPS REVENUE CREDIT

## 2019-06-03 PROCEDURE — G0300 HHS/HOSPICE OF LPN EA 15 MIN: HCPCS

## 2019-06-03 PROCEDURE — 3331090002 HH PPS REVENUE DEBIT

## 2019-06-04 ENCOUNTER — HOME CARE VISIT (OUTPATIENT)
Dept: SCHEDULING | Facility: HOME HEALTH | Age: 44
End: 2019-06-04
Payer: MEDICARE

## 2019-06-04 VITALS
TEMPERATURE: 97.7 F | OXYGEN SATURATION: 98 % | SYSTOLIC BLOOD PRESSURE: 93 MMHG | HEART RATE: 80 BPM | DIASTOLIC BLOOD PRESSURE: 62 MMHG

## 2019-06-04 PROCEDURE — 3331090001 HH PPS REVENUE CREDIT

## 2019-06-04 PROCEDURE — G0157 HHC PT ASSISTANT EA 15: HCPCS

## 2019-06-04 PROCEDURE — 3331090002 HH PPS REVENUE DEBIT

## 2019-06-05 PROCEDURE — 3331090001 HH PPS REVENUE CREDIT

## 2019-06-05 PROCEDURE — 3331090002 HH PPS REVENUE DEBIT

## 2019-06-06 ENCOUNTER — HOME CARE VISIT (OUTPATIENT)
Dept: SCHEDULING | Facility: HOME HEALTH | Age: 44
End: 2019-06-06
Payer: MEDICARE

## 2019-06-06 VITALS
HEART RATE: 97 BPM | DIASTOLIC BLOOD PRESSURE: 56 MMHG | TEMPERATURE: 97 F | RESPIRATION RATE: 18 BRPM | SYSTOLIC BLOOD PRESSURE: 86 MMHG | OXYGEN SATURATION: 98 %

## 2019-06-06 PROCEDURE — G0300 HHS/HOSPICE OF LPN EA 15 MIN: HCPCS

## 2019-06-06 PROCEDURE — 3331090001 HH PPS REVENUE CREDIT

## 2019-06-06 PROCEDURE — 3331090002 HH PPS REVENUE DEBIT

## 2019-06-07 ENCOUNTER — HOME CARE VISIT (OUTPATIENT)
Dept: SCHEDULING | Facility: HOME HEALTH | Age: 44
End: 2019-06-07
Payer: MEDICARE

## 2019-06-07 VITALS
SYSTOLIC BLOOD PRESSURE: 83 MMHG | RESPIRATION RATE: 16 BRPM | HEART RATE: 80 BPM | DIASTOLIC BLOOD PRESSURE: 62 MMHG | TEMPERATURE: 97.8 F | OXYGEN SATURATION: 98 %

## 2019-06-07 PROCEDURE — G0157 HHC PT ASSISTANT EA 15: HCPCS

## 2019-06-07 PROCEDURE — 3331090002 HH PPS REVENUE DEBIT

## 2019-06-07 PROCEDURE — 3331090001 HH PPS REVENUE CREDIT

## 2019-06-08 PROCEDURE — 3331090001 HH PPS REVENUE CREDIT

## 2019-06-08 PROCEDURE — 3331090002 HH PPS REVENUE DEBIT

## 2019-06-09 PROCEDURE — 3331090002 HH PPS REVENUE DEBIT

## 2019-06-09 PROCEDURE — 3331090001 HH PPS REVENUE CREDIT

## 2019-06-10 PROCEDURE — 3331090002 HH PPS REVENUE DEBIT

## 2019-06-10 PROCEDURE — 3331090001 HH PPS REVENUE CREDIT

## 2019-06-11 ENCOUNTER — TELEPHONE (OUTPATIENT)
Dept: SURGERY | Age: 44
End: 2019-06-11

## 2019-06-11 ENCOUNTER — HOME CARE VISIT (OUTPATIENT)
Dept: HOME HEALTH SERVICES | Facility: HOME HEALTH | Age: 44
End: 2019-06-11
Payer: MEDICARE

## 2019-06-11 ENCOUNTER — HOME CARE VISIT (OUTPATIENT)
Dept: SCHEDULING | Facility: HOME HEALTH | Age: 44
End: 2019-06-11
Payer: MEDICARE

## 2019-06-11 PROCEDURE — 3331090001 HH PPS REVENUE CREDIT

## 2019-06-11 PROCEDURE — G0151 HHCP-SERV OF PT,EA 15 MIN: HCPCS

## 2019-06-11 PROCEDURE — 3331090002 HH PPS REVENUE DEBIT

## 2019-06-11 NOTE — TELEPHONE ENCOUNTER
I returned home health nurse [place] call and she states patients regular nurse Sahra Shane is off today and patient does not want anyone else to come out and change her PICC line bandage. She wants to know if Dr. Tammie Mcdermott will be ok with one day overdue  bandage change. Dr. Tammie Mcdermott said ok.

## 2019-06-12 ENCOUNTER — HOME CARE VISIT (OUTPATIENT)
Dept: SCHEDULING | Facility: HOME HEALTH | Age: 44
End: 2019-06-12
Payer: MEDICARE

## 2019-06-12 VITALS
OXYGEN SATURATION: 98 % | HEART RATE: 95 BPM | DIASTOLIC BLOOD PRESSURE: 66 MMHG | TEMPERATURE: 97.3 F | SYSTOLIC BLOOD PRESSURE: 104 MMHG | RESPIRATION RATE: 18 BRPM

## 2019-06-12 VITALS
RESPIRATION RATE: 20 BRPM | SYSTOLIC BLOOD PRESSURE: 112 MMHG | HEART RATE: 95 BPM | TEMPERATURE: 98.4 F | OXYGEN SATURATION: 99 % | DIASTOLIC BLOOD PRESSURE: 69 MMHG

## 2019-06-12 PROCEDURE — 3331090002 HH PPS REVENUE DEBIT

## 2019-06-12 PROCEDURE — 3331090001 HH PPS REVENUE CREDIT

## 2019-06-12 PROCEDURE — G0300 HHS/HOSPICE OF LPN EA 15 MIN: HCPCS

## 2019-06-13 ENCOUNTER — HOME CARE VISIT (OUTPATIENT)
Dept: SCHEDULING | Facility: HOME HEALTH | Age: 44
End: 2019-06-13
Payer: MEDICARE

## 2019-06-13 VITALS
SYSTOLIC BLOOD PRESSURE: 96 MMHG | HEART RATE: 90 BPM | TEMPERATURE: 97.9 F | DIASTOLIC BLOOD PRESSURE: 61 MMHG | OXYGEN SATURATION: 99 %

## 2019-06-13 PROCEDURE — 3331090002 HH PPS REVENUE DEBIT

## 2019-06-13 PROCEDURE — G0157 HHC PT ASSISTANT EA 15: HCPCS

## 2019-06-13 PROCEDURE — 3331090001 HH PPS REVENUE CREDIT

## 2019-06-14 ENCOUNTER — HOME CARE VISIT (OUTPATIENT)
Dept: SCHEDULING | Facility: HOME HEALTH | Age: 44
End: 2019-06-14
Payer: MEDICARE

## 2019-06-14 PROCEDURE — G0300 HHS/HOSPICE OF LPN EA 15 MIN: HCPCS

## 2019-06-14 PROCEDURE — 3331090001 HH PPS REVENUE CREDIT

## 2019-06-14 PROCEDURE — 3331090002 HH PPS REVENUE DEBIT

## 2019-06-15 PROCEDURE — 3331090002 HH PPS REVENUE DEBIT

## 2019-06-15 PROCEDURE — 3331090001 HH PPS REVENUE CREDIT

## 2019-06-16 VITALS
SYSTOLIC BLOOD PRESSURE: 100 MMHG | RESPIRATION RATE: 18 BRPM | OXYGEN SATURATION: 99 % | TEMPERATURE: 97.7 F | DIASTOLIC BLOOD PRESSURE: 66 MMHG | HEART RATE: 89 BPM

## 2019-06-16 PROCEDURE — 3331090001 HH PPS REVENUE CREDIT

## 2019-06-16 PROCEDURE — 3331090002 HH PPS REVENUE DEBIT

## 2019-06-17 ENCOUNTER — HOME CARE VISIT (OUTPATIENT)
Dept: SCHEDULING | Facility: HOME HEALTH | Age: 44
End: 2019-06-17
Payer: MEDICARE

## 2019-06-17 VITALS
OXYGEN SATURATION: 99 % | DIASTOLIC BLOOD PRESSURE: 56 MMHG | SYSTOLIC BLOOD PRESSURE: 88 MMHG | HEART RATE: 83 BPM | RESPIRATION RATE: 18 BRPM | TEMPERATURE: 97 F

## 2019-06-17 VITALS — DIASTOLIC BLOOD PRESSURE: 62 MMHG | OXYGEN SATURATION: 98 % | HEART RATE: 70 BPM | SYSTOLIC BLOOD PRESSURE: 100 MMHG

## 2019-06-17 PROCEDURE — 3331090002 HH PPS REVENUE DEBIT

## 2019-06-17 PROCEDURE — G0300 HHS/HOSPICE OF LPN EA 15 MIN: HCPCS

## 2019-06-17 PROCEDURE — G0157 HHC PT ASSISTANT EA 15: HCPCS

## 2019-06-17 PROCEDURE — 3331090001 HH PPS REVENUE CREDIT

## 2019-06-18 PROCEDURE — 3331090002 HH PPS REVENUE DEBIT

## 2019-06-18 PROCEDURE — 3331090001 HH PPS REVENUE CREDIT

## 2019-06-19 PROCEDURE — 3331090002 HH PPS REVENUE DEBIT

## 2019-06-19 PROCEDURE — 3331090001 HH PPS REVENUE CREDIT

## 2019-06-20 ENCOUNTER — HOME CARE VISIT (OUTPATIENT)
Dept: HOME HEALTH SERVICES | Facility: HOME HEALTH | Age: 44
End: 2019-06-20
Payer: MEDICARE

## 2019-06-20 ENCOUNTER — HOME CARE VISIT (OUTPATIENT)
Dept: SCHEDULING | Facility: HOME HEALTH | Age: 44
End: 2019-06-20
Payer: MEDICARE

## 2019-06-20 VITALS
RESPIRATION RATE: 20 BRPM | SYSTOLIC BLOOD PRESSURE: 106 MMHG | DIASTOLIC BLOOD PRESSURE: 69 MMHG | OXYGEN SATURATION: 99 % | TEMPERATURE: 97 F | HEART RATE: 102 BPM

## 2019-06-20 PROCEDURE — G0300 HHS/HOSPICE OF LPN EA 15 MIN: HCPCS

## 2019-06-20 PROCEDURE — 3331090001 HH PPS REVENUE CREDIT

## 2019-06-20 PROCEDURE — 3331090002 HH PPS REVENUE DEBIT

## 2019-06-21 ENCOUNTER — HOME CARE VISIT (OUTPATIENT)
Dept: SCHEDULING | Facility: HOME HEALTH | Age: 44
End: 2019-06-21
Payer: MEDICARE

## 2019-06-21 VITALS — SYSTOLIC BLOOD PRESSURE: 128 MMHG | DIASTOLIC BLOOD PRESSURE: 76 MMHG | OXYGEN SATURATION: 99 % | HEART RATE: 91 BPM

## 2019-06-21 PROCEDURE — 3331090001 HH PPS REVENUE CREDIT

## 2019-06-21 PROCEDURE — 3331090002 HH PPS REVENUE DEBIT

## 2019-06-21 PROCEDURE — G0157 HHC PT ASSISTANT EA 15: HCPCS

## 2019-06-22 PROCEDURE — 3331090001 HH PPS REVENUE CREDIT

## 2019-06-22 PROCEDURE — 3331090002 HH PPS REVENUE DEBIT

## 2019-06-23 PROCEDURE — 3331090002 HH PPS REVENUE DEBIT

## 2019-06-23 PROCEDURE — 3331090001 HH PPS REVENUE CREDIT

## 2019-06-24 ENCOUNTER — HOME CARE VISIT (OUTPATIENT)
Dept: SCHEDULING | Facility: HOME HEALTH | Age: 44
End: 2019-06-24
Payer: MEDICARE

## 2019-06-24 VITALS
DIASTOLIC BLOOD PRESSURE: 61 MMHG | TEMPERATURE: 97.9 F | OXYGEN SATURATION: 96 % | RESPIRATION RATE: 18 BRPM | HEART RATE: 82 BPM | SYSTOLIC BLOOD PRESSURE: 96 MMHG

## 2019-06-24 PROCEDURE — 3331090001 HH PPS REVENUE CREDIT

## 2019-06-24 PROCEDURE — 3331090002 HH PPS REVENUE DEBIT

## 2019-06-24 PROCEDURE — G0300 HHS/HOSPICE OF LPN EA 15 MIN: HCPCS

## 2019-06-25 PROCEDURE — 3331090002 HH PPS REVENUE DEBIT

## 2019-06-25 PROCEDURE — 3331090001 HH PPS REVENUE CREDIT

## 2019-06-26 ENCOUNTER — HOME CARE VISIT (OUTPATIENT)
Dept: SCHEDULING | Facility: HOME HEALTH | Age: 44
End: 2019-06-26
Payer: MEDICARE

## 2019-06-26 PROCEDURE — 3331090001 HH PPS REVENUE CREDIT

## 2019-06-26 PROCEDURE — 3331090002 HH PPS REVENUE DEBIT

## 2019-06-27 ENCOUNTER — HOME CARE VISIT (OUTPATIENT)
Dept: SCHEDULING | Facility: HOME HEALTH | Age: 44
End: 2019-06-27
Payer: MEDICARE

## 2019-06-27 ENCOUNTER — HOME CARE VISIT (OUTPATIENT)
Dept: HOME HEALTH SERVICES | Facility: HOME HEALTH | Age: 44
End: 2019-06-27
Payer: MEDICARE

## 2019-06-27 VITALS
SYSTOLIC BLOOD PRESSURE: 104 MMHG | HEART RATE: 75 BPM | TEMPERATURE: 97.9 F | DIASTOLIC BLOOD PRESSURE: 65 MMHG | OXYGEN SATURATION: 99 %

## 2019-06-27 PROCEDURE — 3331090001 HH PPS REVENUE CREDIT

## 2019-06-27 PROCEDURE — G0157 HHC PT ASSISTANT EA 15: HCPCS

## 2019-06-27 PROCEDURE — 3331090002 HH PPS REVENUE DEBIT

## 2019-06-28 PROCEDURE — 3331090002 HH PPS REVENUE DEBIT

## 2019-06-28 PROCEDURE — 3331090001 HH PPS REVENUE CREDIT

## 2019-06-29 PROCEDURE — 3331090001 HH PPS REVENUE CREDIT

## 2019-06-29 PROCEDURE — 3331090002 HH PPS REVENUE DEBIT

## 2019-06-30 PROCEDURE — 3331090001 HH PPS REVENUE CREDIT

## 2019-06-30 PROCEDURE — 3331090002 HH PPS REVENUE DEBIT

## 2019-07-01 ENCOUNTER — HOME CARE VISIT (OUTPATIENT)
Dept: SCHEDULING | Facility: HOME HEALTH | Age: 44
End: 2019-07-01
Payer: MEDICARE

## 2019-07-01 PROCEDURE — 3331090002 HH PPS REVENUE DEBIT

## 2019-07-01 PROCEDURE — 3331090001 HH PPS REVENUE CREDIT

## 2019-07-01 PROCEDURE — G0300 HHS/HOSPICE OF LPN EA 15 MIN: HCPCS

## 2019-07-02 ENCOUNTER — HOME CARE VISIT (OUTPATIENT)
Dept: SCHEDULING | Facility: HOME HEALTH | Age: 44
End: 2019-07-02
Payer: MEDICARE

## 2019-07-02 VITALS
DIASTOLIC BLOOD PRESSURE: 60 MMHG | TEMPERATURE: 97.7 F | RESPIRATION RATE: 20 BRPM | SYSTOLIC BLOOD PRESSURE: 98 MMHG | HEART RATE: 75 BPM | OXYGEN SATURATION: 98 %

## 2019-07-02 PROCEDURE — 3331090001 HH PPS REVENUE CREDIT

## 2019-07-02 PROCEDURE — 3331090002 HH PPS REVENUE DEBIT

## 2019-07-02 PROCEDURE — G0157 HHC PT ASSISTANT EA 15: HCPCS

## 2019-07-03 VITALS
SYSTOLIC BLOOD PRESSURE: 99 MMHG | HEART RATE: 79 BPM | DIASTOLIC BLOOD PRESSURE: 66 MMHG | TEMPERATURE: 97.5 F | OXYGEN SATURATION: 99 %

## 2019-07-03 PROCEDURE — 3331090002 HH PPS REVENUE DEBIT

## 2019-07-03 PROCEDURE — 3331090001 HH PPS REVENUE CREDIT

## 2019-07-04 ENCOUNTER — HOME CARE VISIT (OUTPATIENT)
Dept: SCHEDULING | Facility: HOME HEALTH | Age: 44
End: 2019-07-04
Payer: MEDICARE

## 2019-07-04 PROCEDURE — 3331090002 HH PPS REVENUE DEBIT

## 2019-07-04 PROCEDURE — 3331090001 HH PPS REVENUE CREDIT

## 2019-07-04 PROCEDURE — G0300 HHS/HOSPICE OF LPN EA 15 MIN: HCPCS

## 2019-07-05 ENCOUNTER — HOME CARE VISIT (OUTPATIENT)
Dept: SCHEDULING | Facility: HOME HEALTH | Age: 44
End: 2019-07-05
Payer: MEDICARE

## 2019-07-05 VITALS
OXYGEN SATURATION: 97 % | SYSTOLIC BLOOD PRESSURE: 97 MMHG | HEART RATE: 76 BPM | TEMPERATURE: 97.7 F | DIASTOLIC BLOOD PRESSURE: 67 MMHG

## 2019-07-05 PROCEDURE — G0157 HHC PT ASSISTANT EA 15: HCPCS

## 2019-07-05 PROCEDURE — 3331090002 HH PPS REVENUE DEBIT

## 2019-07-05 PROCEDURE — 3331090001 HH PPS REVENUE CREDIT

## 2019-07-06 PROCEDURE — 3331090001 HH PPS REVENUE CREDIT

## 2019-07-06 PROCEDURE — 3331090002 HH PPS REVENUE DEBIT

## 2019-07-07 PROCEDURE — 3331090002 HH PPS REVENUE DEBIT

## 2019-07-07 PROCEDURE — 3331090001 HH PPS REVENUE CREDIT

## 2019-07-08 ENCOUNTER — HOME CARE VISIT (OUTPATIENT)
Dept: SCHEDULING | Facility: HOME HEALTH | Age: 44
End: 2019-07-08
Payer: MEDICARE

## 2019-07-08 VITALS
DIASTOLIC BLOOD PRESSURE: 68 MMHG | SYSTOLIC BLOOD PRESSURE: 106 MMHG | OXYGEN SATURATION: 97 % | HEART RATE: 87 BPM | RESPIRATION RATE: 18 BRPM | TEMPERATURE: 97.8 F

## 2019-07-08 PROCEDURE — G0300 HHS/HOSPICE OF LPN EA 15 MIN: HCPCS

## 2019-07-08 PROCEDURE — 3331090001 HH PPS REVENUE CREDIT

## 2019-07-08 PROCEDURE — 3331090002 HH PPS REVENUE DEBIT

## 2019-07-09 ENCOUNTER — HOME CARE VISIT (OUTPATIENT)
Dept: SCHEDULING | Facility: HOME HEALTH | Age: 44
End: 2019-07-09
Payer: MEDICARE

## 2019-07-09 PROCEDURE — 3331090002 HH PPS REVENUE DEBIT

## 2019-07-09 PROCEDURE — 3331090001 HH PPS REVENUE CREDIT

## 2019-07-09 PROCEDURE — G0157 HHC PT ASSISTANT EA 15: HCPCS

## 2019-07-10 ENCOUNTER — HOME CARE VISIT (OUTPATIENT)
Dept: SCHEDULING | Facility: HOME HEALTH | Age: 44
End: 2019-07-10
Payer: MEDICARE

## 2019-07-10 VITALS
HEART RATE: 76 BPM | SYSTOLIC BLOOD PRESSURE: 110 MMHG | OXYGEN SATURATION: 99 % | TEMPERATURE: 97.5 F | DIASTOLIC BLOOD PRESSURE: 76 MMHG

## 2019-07-10 VITALS
SYSTOLIC BLOOD PRESSURE: 121 MMHG | HEART RATE: 76 BPM | RESPIRATION RATE: 14 BRPM | OXYGEN SATURATION: 98 % | TEMPERATURE: 97.7 F | DIASTOLIC BLOOD PRESSURE: 77 MMHG

## 2019-07-10 PROCEDURE — 3331090002 HH PPS REVENUE DEBIT

## 2019-07-10 PROCEDURE — 3331090001 HH PPS REVENUE CREDIT

## 2019-07-10 PROCEDURE — G0151 HHCP-SERV OF PT,EA 15 MIN: HCPCS

## 2019-07-11 ENCOUNTER — HOME CARE VISIT (OUTPATIENT)
Dept: SCHEDULING | Facility: HOME HEALTH | Age: 44
End: 2019-07-11
Payer: MEDICARE

## 2019-07-11 VITALS
SYSTOLIC BLOOD PRESSURE: 120 MMHG | OXYGEN SATURATION: 97 % | DIASTOLIC BLOOD PRESSURE: 80 MMHG | RESPIRATION RATE: 18 BRPM | TEMPERATURE: 97 F | HEART RATE: 82 BPM

## 2019-07-11 PROCEDURE — G0300 HHS/HOSPICE OF LPN EA 15 MIN: HCPCS

## 2019-07-11 PROCEDURE — 3331090002 HH PPS REVENUE DEBIT

## 2019-07-11 PROCEDURE — 3331090001 HH PPS REVENUE CREDIT

## 2019-07-12 PROCEDURE — 3331090002 HH PPS REVENUE DEBIT

## 2019-07-12 PROCEDURE — 3331090001 HH PPS REVENUE CREDIT

## 2019-07-13 PROCEDURE — 3331090001 HH PPS REVENUE CREDIT

## 2019-07-13 PROCEDURE — 3331090002 HH PPS REVENUE DEBIT

## 2019-07-14 VITALS
RESPIRATION RATE: 18 BRPM | OXYGEN SATURATION: 99 % | TEMPERATURE: 97.8 F | DIASTOLIC BLOOD PRESSURE: 72 MMHG | SYSTOLIC BLOOD PRESSURE: 112 MMHG | HEART RATE: 85 BPM

## 2019-07-14 PROCEDURE — 3331090001 HH PPS REVENUE CREDIT

## 2019-07-14 PROCEDURE — 3331090002 HH PPS REVENUE DEBIT

## 2019-07-15 ENCOUNTER — HOME CARE VISIT (OUTPATIENT)
Dept: SCHEDULING | Facility: HOME HEALTH | Age: 44
End: 2019-07-15
Payer: MEDICARE

## 2019-07-15 VITALS
SYSTOLIC BLOOD PRESSURE: 99 MMHG | TEMPERATURE: 98.2 F | HEART RATE: 57 BPM | RESPIRATION RATE: 16 BRPM | OXYGEN SATURATION: 99 % | DIASTOLIC BLOOD PRESSURE: 56 MMHG

## 2019-07-15 PROCEDURE — 3331090001 HH PPS REVENUE CREDIT

## 2019-07-15 PROCEDURE — 3331090002 HH PPS REVENUE DEBIT

## 2019-07-15 PROCEDURE — G0299 HHS/HOSPICE OF RN EA 15 MIN: HCPCS

## 2019-07-16 PROCEDURE — 3331090002 HH PPS REVENUE DEBIT

## 2019-07-16 PROCEDURE — 3331090001 HH PPS REVENUE CREDIT

## 2019-07-17 PROCEDURE — 3331090002 HH PPS REVENUE DEBIT

## 2019-07-17 PROCEDURE — 3331090001 HH PPS REVENUE CREDIT

## 2019-07-17 PROCEDURE — 3331090003 HH PPS REVENUE ADJ

## 2019-07-18 ENCOUNTER — HOME CARE VISIT (OUTPATIENT)
Dept: SCHEDULING | Facility: HOME HEALTH | Age: 44
End: 2019-07-18
Payer: MEDICARE

## 2019-07-18 PROCEDURE — 3331090002 HH PPS REVENUE DEBIT

## 2019-07-18 PROCEDURE — 400014 HH F/U

## 2019-07-18 PROCEDURE — 3331090001 HH PPS REVENUE CREDIT

## 2019-07-18 PROCEDURE — G0300 HHS/HOSPICE OF LPN EA 15 MIN: HCPCS

## 2019-07-19 ENCOUNTER — HOME CARE VISIT (OUTPATIENT)
Dept: SCHEDULING | Facility: HOME HEALTH | Age: 44
End: 2019-07-19
Payer: MEDICARE

## 2019-07-19 VITALS
TEMPERATURE: 97.5 F | HEART RATE: 62 BPM | OXYGEN SATURATION: 99 % | DIASTOLIC BLOOD PRESSURE: 71 MMHG | SYSTOLIC BLOOD PRESSURE: 106 MMHG

## 2019-07-19 PROCEDURE — 3331090002 HH PPS REVENUE DEBIT

## 2019-07-19 PROCEDURE — G0157 HHC PT ASSISTANT EA 15: HCPCS

## 2019-07-19 PROCEDURE — 3331090001 HH PPS REVENUE CREDIT

## 2019-07-20 VITALS
HEART RATE: 87 BPM | RESPIRATION RATE: 18 BRPM | TEMPERATURE: 97.1 F | OXYGEN SATURATION: 99 % | SYSTOLIC BLOOD PRESSURE: 112 MMHG | DIASTOLIC BLOOD PRESSURE: 68 MMHG

## 2019-07-20 PROCEDURE — 3331090002 HH PPS REVENUE DEBIT

## 2019-07-20 PROCEDURE — 3331090001 HH PPS REVENUE CREDIT

## 2019-07-21 PROCEDURE — 3331090002 HH PPS REVENUE DEBIT

## 2019-07-21 PROCEDURE — 3331090001 HH PPS REVENUE CREDIT

## 2019-07-22 ENCOUNTER — HOME CARE VISIT (OUTPATIENT)
Dept: SCHEDULING | Facility: HOME HEALTH | Age: 44
End: 2019-07-22
Payer: MEDICARE

## 2019-07-22 VITALS
RESPIRATION RATE: 18 BRPM | SYSTOLIC BLOOD PRESSURE: 112 MMHG | TEMPERATURE: 97.4 F | HEART RATE: 63 BPM | DIASTOLIC BLOOD PRESSURE: 62 MMHG | OXYGEN SATURATION: 99 %

## 2019-07-22 PROCEDURE — 3331090002 HH PPS REVENUE DEBIT

## 2019-07-22 PROCEDURE — G0300 HHS/HOSPICE OF LPN EA 15 MIN: HCPCS

## 2019-07-22 PROCEDURE — 3331090001 HH PPS REVENUE CREDIT

## 2019-07-23 ENCOUNTER — HOME CARE VISIT (OUTPATIENT)
Dept: SCHEDULING | Facility: HOME HEALTH | Age: 44
End: 2019-07-23
Payer: MEDICARE

## 2019-07-23 ENCOUNTER — TELEPHONE (OUTPATIENT)
Dept: SURGERY | Age: 44
End: 2019-07-23

## 2019-07-23 VITALS
TEMPERATURE: 97.7 F | SYSTOLIC BLOOD PRESSURE: 111 MMHG | OXYGEN SATURATION: 99 % | DIASTOLIC BLOOD PRESSURE: 72 MMHG | HEART RATE: 67 BPM

## 2019-07-23 PROCEDURE — 3331090002 HH PPS REVENUE DEBIT

## 2019-07-23 PROCEDURE — 3331090001 HH PPS REVENUE CREDIT

## 2019-07-23 PROCEDURE — G0157 HHC PT ASSISTANT EA 15: HCPCS

## 2019-07-23 NOTE — TELEPHONE ENCOUNTER
Patient stated she has sent paperwork from [employer] to get her supplies.  She would like a call back when the paperwork has been completed or let her know if the paperwork hasn't been received

## 2019-07-23 NOTE — TELEPHONE ENCOUNTER
I returned patients call and told her I thought we had received all the [employer] requests and that Dr. Diogo Allen just needed to sign off on them. I told her hopefully he would do that on Friday when he picks up and I can fax them on Monday.

## 2019-07-24 ENCOUNTER — HOSPITAL ENCOUNTER (OUTPATIENT)
Dept: INTERVENTIONAL RADIOLOGY/VASCULAR | Age: 44
Discharge: HOME | End: 2019-07-24
Attending: SURGERY | Admitting: RADIOLOGY
Payer: MEDICARE

## 2019-07-24 VITALS
RESPIRATION RATE: 16 BRPM | OXYGEN SATURATION: 100 % | HEART RATE: 66 BPM | SYSTOLIC BLOOD PRESSURE: 137 MMHG | TEMPERATURE: 98.7 F | DIASTOLIC BLOOD PRESSURE: 63 MMHG

## 2019-07-24 DIAGNOSIS — K63.2 ENTEROCUTANEOUS FISTULA: ICD-10-CM

## 2019-07-24 PROCEDURE — 3331090002 HH PPS REVENUE DEBIT

## 2019-07-24 PROCEDURE — 76937 US GUIDE VASCULAR ACCESS: CPT

## 2019-07-24 PROCEDURE — 3331090001 HH PPS REVENUE CREDIT

## 2019-07-25 ENCOUNTER — HOME CARE VISIT (OUTPATIENT)
Dept: HOME HEALTH SERVICES | Facility: HOME HEALTH | Age: 44
End: 2019-07-25
Payer: MEDICARE

## 2019-07-25 PROCEDURE — 3331090001 HH PPS REVENUE CREDIT

## 2019-07-25 PROCEDURE — 3331090002 HH PPS REVENUE DEBIT

## 2019-07-26 ENCOUNTER — HOME CARE VISIT (OUTPATIENT)
Dept: SCHEDULING | Facility: HOME HEALTH | Age: 44
End: 2019-07-26
Payer: MEDICARE

## 2019-07-26 VITALS
TEMPERATURE: 97.3 F | OXYGEN SATURATION: 99 % | DIASTOLIC BLOOD PRESSURE: 62 MMHG | HEART RATE: 57 BPM | SYSTOLIC BLOOD PRESSURE: 97 MMHG

## 2019-07-26 PROCEDURE — 3331090001 HH PPS REVENUE CREDIT

## 2019-07-26 PROCEDURE — 3331090002 HH PPS REVENUE DEBIT

## 2019-07-26 PROCEDURE — G0157 HHC PT ASSISTANT EA 15: HCPCS

## 2019-07-27 PROCEDURE — 3331090002 HH PPS REVENUE DEBIT

## 2019-07-27 PROCEDURE — 3331090001 HH PPS REVENUE CREDIT

## 2019-07-28 PROCEDURE — 3331090002 HH PPS REVENUE DEBIT

## 2019-07-28 PROCEDURE — 3331090001 HH PPS REVENUE CREDIT

## 2019-07-29 ENCOUNTER — HOME CARE VISIT (OUTPATIENT)
Dept: SCHEDULING | Facility: HOME HEALTH | Age: 44
End: 2019-07-29
Payer: MEDICARE

## 2019-07-29 ENCOUNTER — HOME CARE VISIT (OUTPATIENT)
Dept: HOME HEALTH SERVICES | Facility: HOME HEALTH | Age: 44
End: 2019-07-29
Payer: MEDICARE

## 2019-07-29 VITALS
TEMPERATURE: 97.4 F | OXYGEN SATURATION: 98 % | HEART RATE: 60 BPM | DIASTOLIC BLOOD PRESSURE: 60 MMHG | RESPIRATION RATE: 18 BRPM | SYSTOLIC BLOOD PRESSURE: 110 MMHG

## 2019-07-29 PROCEDURE — G0300 HHS/HOSPICE OF LPN EA 15 MIN: HCPCS

## 2019-07-29 PROCEDURE — 3331090001 HH PPS REVENUE CREDIT

## 2019-07-29 PROCEDURE — G0157 HHC PT ASSISTANT EA 15: HCPCS

## 2019-07-29 PROCEDURE — 3331090002 HH PPS REVENUE DEBIT

## 2019-07-30 VITALS
SYSTOLIC BLOOD PRESSURE: 119 MMHG | OXYGEN SATURATION: 99 % | TEMPERATURE: 97.2 F | DIASTOLIC BLOOD PRESSURE: 77 MMHG | HEART RATE: 63 BPM

## 2019-07-30 LAB
ALBUMIN SERPL-MCNC: 3.2 G/DL (ref 3.5–5.5)
ALBUMIN/GLOB SERPL: 1.3 {RATIO} (ref 1.2–2.2)
ALP SERPL-CCNC: 137 IU/L (ref 39–117)
ALT SERPL-CCNC: 35 IU/L (ref 0–32)
AST SERPL-CCNC: 13 IU/L (ref 0–40)
BASOPHILS # BLD AUTO: 0 X10E3/UL (ref 0–0.2)
BASOPHILS NFR BLD AUTO: 0 %
BILIRUB SERPL-MCNC: 0.7 MG/DL (ref 0–1.2)
BUN SERPL-MCNC: 29 MG/DL (ref 6–24)
BUN/CREAT SERPL: 38 (ref 9–23)
CALCIUM SERPL-MCNC: 8.4 MG/DL (ref 8.7–10.2)
CHLORIDE SERPL-SCNC: 107 MMOL/L (ref 96–106)
CO2 SERPL-SCNC: 23 MMOL/L (ref 20–29)
CREAT SERPL-MCNC: 0.77 MG/DL (ref 0.57–1)
EOSINOPHIL # BLD AUTO: 0.2 X10E3/UL (ref 0–0.4)
EOSINOPHIL NFR BLD AUTO: 2 %
ERYTHROCYTE [DISTWIDTH] IN BLOOD BY AUTOMATED COUNT: 15.9 % (ref 12.3–15.4)
GLOBULIN SER CALC-MCNC: 2.4 G/DL (ref 1.5–4.5)
GLUCOSE SERPL-MCNC: 70 MG/DL (ref 65–99)
HCT VFR BLD AUTO: 37.8 % (ref 34–46.6)
HGB BLD-MCNC: 11.9 G/DL (ref 11.1–15.9)
IMM GRANULOCYTES # BLD AUTO: 0.1 X10E3/UL (ref 0–0.1)
IMM GRANULOCYTES NFR BLD AUTO: 1 %
LYMPHOCYTES # BLD AUTO: 2.7 X10E3/UL (ref 0.7–3.1)
LYMPHOCYTES NFR BLD AUTO: 25 %
MAGNESIUM SERPL-MCNC: 2 MG/DL (ref 1.6–2.3)
MCH RBC QN AUTO: 30 PG (ref 26.6–33)
MCHC RBC AUTO-ENTMCNC: 31.5 G/DL (ref 31.5–35.7)
MCV RBC AUTO: 95 FL (ref 79–97)
MONOCYTES # BLD AUTO: 0.5 X10E3/UL (ref 0.1–0.9)
MONOCYTES NFR BLD AUTO: 5 %
NEUTROPHILS # BLD AUTO: 7.3 X10E3/UL (ref 1.4–7)
NEUTROPHILS NFR BLD AUTO: 67 %
PHOSPHATE SERPL-MCNC: 2.4 MG/DL (ref 2.5–4.5)
PLATELET # BLD AUTO: 187 X10E3/UL (ref 150–450)
POTASSIUM SERPL-SCNC: 3.9 MMOL/L (ref 3.5–5.2)
PREALB SERPL-MCNC: 30 MG/DL (ref 12–34)
PROT SERPL-MCNC: 5.6 G/DL (ref 6–8.5)
RBC # BLD AUTO: 3.97 X10E6/UL (ref 3.77–5.28)
SODIUM SERPL-SCNC: 143 MMOL/L (ref 134–144)
TRIGL SERPL-MCNC: 106 MG/DL (ref 0–149)
WBC # BLD AUTO: 10.8 X10E3/UL (ref 3.4–10.8)

## 2019-07-30 PROCEDURE — 3331090002 HH PPS REVENUE DEBIT

## 2019-07-30 PROCEDURE — 3331090001 HH PPS REVENUE CREDIT

## 2019-07-31 ENCOUNTER — HOME CARE VISIT (OUTPATIENT)
Dept: SCHEDULING | Facility: HOME HEALTH | Age: 44
End: 2019-07-31
Payer: MEDICARE

## 2019-07-31 VITALS
DIASTOLIC BLOOD PRESSURE: 63 MMHG | HEART RATE: 64 BPM | TEMPERATURE: 98.1 F | SYSTOLIC BLOOD PRESSURE: 98 MMHG | OXYGEN SATURATION: 99 %

## 2019-07-31 PROCEDURE — 3331090001 HH PPS REVENUE CREDIT

## 2019-07-31 PROCEDURE — 3331090002 HH PPS REVENUE DEBIT

## 2019-07-31 PROCEDURE — G0157 HHC PT ASSISTANT EA 15: HCPCS

## 2019-08-01 ENCOUNTER — HOME CARE VISIT (OUTPATIENT)
Dept: SCHEDULING | Facility: HOME HEALTH | Age: 44
End: 2019-08-01
Payer: MEDICARE

## 2019-08-01 VITALS
RESPIRATION RATE: 20 BRPM | SYSTOLIC BLOOD PRESSURE: 108 MMHG | DIASTOLIC BLOOD PRESSURE: 74 MMHG | HEART RATE: 82 BPM | OXYGEN SATURATION: 99 % | TEMPERATURE: 97 F

## 2019-08-01 PROCEDURE — G0300 HHS/HOSPICE OF LPN EA 15 MIN: HCPCS

## 2019-08-01 PROCEDURE — 3331090002 HH PPS REVENUE DEBIT

## 2019-08-01 PROCEDURE — 3331090001 HH PPS REVENUE CREDIT

## 2019-08-02 PROCEDURE — 3331090001 HH PPS REVENUE CREDIT

## 2019-08-02 PROCEDURE — 3331090002 HH PPS REVENUE DEBIT

## 2019-08-03 PROCEDURE — 3331090002 HH PPS REVENUE DEBIT

## 2019-08-03 PROCEDURE — 3331090001 HH PPS REVENUE CREDIT

## 2019-08-04 PROCEDURE — 3331090001 HH PPS REVENUE CREDIT

## 2019-08-04 PROCEDURE — 3331090002 HH PPS REVENUE DEBIT

## 2019-08-05 ENCOUNTER — HOME CARE VISIT (OUTPATIENT)
Dept: SCHEDULING | Facility: HOME HEALTH | Age: 44
End: 2019-08-05
Payer: MEDICARE

## 2019-08-05 VITALS
SYSTOLIC BLOOD PRESSURE: 108 MMHG | RESPIRATION RATE: 18 BRPM | TEMPERATURE: 97.2 F | DIASTOLIC BLOOD PRESSURE: 72 MMHG | HEART RATE: 69 BPM | OXYGEN SATURATION: 99 %

## 2019-08-05 PROCEDURE — 3331090001 HH PPS REVENUE CREDIT

## 2019-08-05 PROCEDURE — G0300 HHS/HOSPICE OF LPN EA 15 MIN: HCPCS

## 2019-08-05 PROCEDURE — 3331090002 HH PPS REVENUE DEBIT

## 2019-08-06 ENCOUNTER — TELEPHONE (OUTPATIENT)
Dept: SURGERY | Age: 44
End: 2019-08-06

## 2019-08-06 ENCOUNTER — HOME CARE VISIT (OUTPATIENT)
Dept: SCHEDULING | Facility: HOME HEALTH | Age: 44
End: 2019-08-06
Payer: MEDICARE

## 2019-08-06 PROCEDURE — 3331090002 HH PPS REVENUE DEBIT

## 2019-08-06 PROCEDURE — 3331090001 HH PPS REVENUE CREDIT

## 2019-08-06 PROCEDURE — G0151 HHCP-SERV OF PT,EA 15 MIN: HCPCS

## 2019-08-06 NOTE — TELEPHONE ENCOUNTER
I returned patients mothers call and told her Dr. Serafin Grey had signed and I had faxed  several things in the last two days. She expressed appreciation and said she would check on it.

## 2019-08-07 ENCOUNTER — TELEPHONE (OUTPATIENT)
Dept: SURGERY | Age: 44
End: 2019-08-07

## 2019-08-07 VITALS
DIASTOLIC BLOOD PRESSURE: 65 MMHG | TEMPERATURE: 97.4 F | RESPIRATION RATE: 14 BRPM | OXYGEN SATURATION: 99 % | HEART RATE: 66 BPM | SYSTOLIC BLOOD PRESSURE: 105 MMHG

## 2019-08-07 PROCEDURE — 3331090002 HH PPS REVENUE DEBIT

## 2019-08-07 PROCEDURE — 3331090001 HH PPS REVENUE CREDIT

## 2019-08-08 ENCOUNTER — TELEPHONE (OUTPATIENT)
Dept: SURGERY | Age: 44
End: 2019-08-08

## 2019-08-08 ENCOUNTER — HOME CARE VISIT (OUTPATIENT)
Dept: HOME HEALTH SERVICES | Facility: HOME HEALTH | Age: 44
End: 2019-08-08
Payer: MEDICARE

## 2019-08-08 ENCOUNTER — HOME CARE VISIT (OUTPATIENT)
Dept: SCHEDULING | Facility: HOME HEALTH | Age: 44
End: 2019-08-08
Payer: MEDICARE

## 2019-08-08 PROCEDURE — 3331090001 HH PPS REVENUE CREDIT

## 2019-08-08 PROCEDURE — G0157 HHC PT ASSISTANT EA 15: HCPCS

## 2019-08-08 PROCEDURE — 3331090002 HH PPS REVENUE DEBIT

## 2019-08-09 ENCOUNTER — TELEPHONE (OUTPATIENT)
Dept: SURGERY | Age: 44
End: 2019-08-09

## 2019-08-09 VITALS
SYSTOLIC BLOOD PRESSURE: 106 MMHG | TEMPERATURE: 97.8 F | HEART RATE: 69 BPM | DIASTOLIC BLOOD PRESSURE: 70 MMHG | OXYGEN SATURATION: 99 %

## 2019-08-09 PROCEDURE — 3331090002 HH PPS REVENUE DEBIT

## 2019-08-09 PROCEDURE — 3331090001 HH PPS REVENUE CREDIT

## 2019-08-09 NOTE — TELEPHONE ENCOUNTER
I returned call from Friday [facility] at  [facility] office Dr. Odell Viveros ([phone])  She is asking what the patient is being seen for so she can get the referral out. She expresses frustration because the patient has not seen Dr. Cameron Holguin since 9/20/18 and continues to call for referrals for various providers, most recently, ours and podiatrist. I told her we were seeing patient to get up to date office note for auth for supplies for intestinal fistula. She states the patients mother called for referral stating it was for wound care and Dr. Cameron Holguin was unaware patient had a wound. She states patient has had several appointments scheduled with Dr. Cameron Holguin but cancels them and Dr. Cameron Holguin said patient needs to be seen by him before he can give any more referrals in the future. I told her I understood and I would get Dr. Karyle Portugal office note to her when it was complete.

## 2019-08-09 NOTE — TELEPHONE ENCOUNTER
Patient's PCP office calling stating she needs to speak with nurse regarding the patient's appointment next week with Dr. Humphrey Leroy.

## 2019-08-10 PROCEDURE — 3331090001 HH PPS REVENUE CREDIT

## 2019-08-10 PROCEDURE — 3331090002 HH PPS REVENUE DEBIT

## 2019-08-11 PROCEDURE — 3331090001 HH PPS REVENUE CREDIT

## 2019-08-11 PROCEDURE — 3331090002 HH PPS REVENUE DEBIT

## 2019-08-12 ENCOUNTER — HOME CARE VISIT (OUTPATIENT)
Dept: SCHEDULING | Facility: HOME HEALTH | Age: 44
End: 2019-08-12
Payer: MEDICARE

## 2019-08-12 VITALS
HEART RATE: 75 BPM | SYSTOLIC BLOOD PRESSURE: 98 MMHG | RESPIRATION RATE: 18 BRPM | OXYGEN SATURATION: 99 % | DIASTOLIC BLOOD PRESSURE: 62 MMHG | TEMPERATURE: 97.1 F

## 2019-08-12 PROCEDURE — 3331090001 HH PPS REVENUE CREDIT

## 2019-08-12 PROCEDURE — 3331090002 HH PPS REVENUE DEBIT

## 2019-08-12 PROCEDURE — G0157 HHC PT ASSISTANT EA 15: HCPCS

## 2019-08-12 PROCEDURE — G0300 HHS/HOSPICE OF LPN EA 15 MIN: HCPCS

## 2019-08-13 ENCOUNTER — TELEPHONE (OUTPATIENT)
Dept: SURGERY | Age: 44
End: 2019-08-13

## 2019-08-13 ENCOUNTER — OFFICE VISIT (OUTPATIENT)
Dept: SURGERY | Age: 44
End: 2019-08-13

## 2019-08-13 VITALS
TEMPERATURE: 99.3 F | DIASTOLIC BLOOD PRESSURE: 80 MMHG | WEIGHT: 276.5 LBS | RESPIRATION RATE: 18 BRPM | HEART RATE: 82 BPM | HEIGHT: 64 IN | BODY MASS INDEX: 47.21 KG/M2 | OXYGEN SATURATION: 99 % | SYSTOLIC BLOOD PRESSURE: 122 MMHG

## 2019-08-13 VITALS — OXYGEN SATURATION: 99 % | HEART RATE: 63 BPM | SYSTOLIC BLOOD PRESSURE: 110 MMHG | DIASTOLIC BLOOD PRESSURE: 70 MMHG

## 2019-08-13 DIAGNOSIS — K63.2 ENTEROCUTANEOUS FISTULA: Primary | ICD-10-CM

## 2019-08-13 PROCEDURE — 3331090001 HH PPS REVENUE CREDIT

## 2019-08-13 PROCEDURE — 3331090002 HH PPS REVENUE DEBIT

## 2019-08-13 RX ORDER — PREDNISONE 5 MG/1
TABLET ORAL 3 TIMES DAILY
COMMUNITY
End: 2019-09-15 | Stop reason: DRUGHIGH

## 2019-08-13 NOTE — PROGRESS NOTES
1. Have you been to the ER, urgent care clinic since your last visit? Hospitalized since your last visit? No    2. Have you seen or consulted any other health care providers outside of the [address] [place] [address] since your last visit? Include any pap smears or colon screening. Dr. Shala Pritchard    Patient states she gets home health twice a week for wound care, PICC line maitneance and she gets TPN daily.

## 2019-08-13 NOTE — TELEPHONE ENCOUNTER
Left message returning Jessica call. I told her I put order on Dr. Madai Beltran desk for signature.

## 2019-08-13 NOTE — TELEPHONE ENCOUNTER
[address] [place] [address]. . looking for signed orders for this patient.     Call back  Yessi [phone]

## 2019-08-13 NOTE — PROGRESS NOTES
Reason for Visit:  Follow-up EC fistula    Brief History:  40 yo woman with hx Crohn's disease s/p colectomy with end ileostomy complicated by bowel perforation after upper endoscopy s/p small bowel resection with subsequent EC fistula and short gut presented to clinic for evaluation of abdominal wound and nutritional status. Pt overall is doing well. She feels her strength is returning and is able to manage her pain better. Pt does have short gut syndrome and requires continuous TPN. She was placed on trial of Gattex to help with absorption but was unable to tolerate it well due to fluid retention. No fevers or chills. No nausea or vomiting. Pt is still managing her midline fistula with wound pouch and ostomy appliance for end ileostomy. Visit Vitals  /80 (BP 1 Location: Right arm, BP Patient Position: Sitting)   Pulse 82   Temp 99.3 °F (37.4 °C) (Oral)   Resp 18   Ht 5' 4\" (1.626 m)   Wt 276 lb 8 oz (125.4 kg)   SpO2 99%   BMI 47.46 kg/m²         Physical Exam:    Gen:  NAD  Pulm:  Unlabored  Abd:  S/ND/appropriate TTP  Wound:  Midline abdominal wound with rosebud fistula and enteric content. Minimal excoriation to skin edge. Ileostomy is pink, patent and minimally productive    AP: 40 yo woman presented for follow-up wound check    - EC fistula:  Given size, location and high output. This will not close on it's own. Will need to continue local wound care with wound appliance system. - Short gut syndrome:  Continue TPN given short gut from proximal fistula. Will obtain upper GI study with small bowel follow-through to evaluate length of small bowel remaining  - Continue end ileostomy care  - Continue physical therapy  - Follow-up after upper GI study to assess nutritional status.

## 2019-08-14 PROCEDURE — 3331090002 HH PPS REVENUE DEBIT

## 2019-08-14 PROCEDURE — 3331090001 HH PPS REVENUE CREDIT

## 2019-08-15 ENCOUNTER — HOME CARE VISIT (OUTPATIENT)
Dept: SCHEDULING | Facility: HOME HEALTH | Age: 44
End: 2019-08-15
Payer: MEDICARE

## 2019-08-15 VITALS
OXYGEN SATURATION: 99 % | DIASTOLIC BLOOD PRESSURE: 72 MMHG | HEART RATE: 58 BPM | SYSTOLIC BLOOD PRESSURE: 112 MMHG | RESPIRATION RATE: 18 BRPM | TEMPERATURE: 97.8 F

## 2019-08-15 PROCEDURE — 3331090002 HH PPS REVENUE DEBIT

## 2019-08-15 PROCEDURE — 3331090001 HH PPS REVENUE CREDIT

## 2019-08-15 PROCEDURE — G0300 HHS/HOSPICE OF LPN EA 15 MIN: HCPCS

## 2019-08-16 ENCOUNTER — HOME CARE VISIT (OUTPATIENT)
Dept: SCHEDULING | Facility: HOME HEALTH | Age: 44
End: 2019-08-16
Payer: MEDICARE

## 2019-08-16 VITALS
SYSTOLIC BLOOD PRESSURE: 123 MMHG | TEMPERATURE: 96.5 F | OXYGEN SATURATION: 99 % | DIASTOLIC BLOOD PRESSURE: 81 MMHG | HEART RATE: 70 BPM

## 2019-08-16 PROCEDURE — G0157 HHC PT ASSISTANT EA 15: HCPCS

## 2019-08-16 PROCEDURE — 3331090001 HH PPS REVENUE CREDIT

## 2019-08-16 PROCEDURE — 3331090002 HH PPS REVENUE DEBIT

## 2019-08-17 PROCEDURE — 3331090002 HH PPS REVENUE DEBIT

## 2019-08-17 PROCEDURE — 3331090001 HH PPS REVENUE CREDIT

## 2019-08-18 PROCEDURE — 3331090002 HH PPS REVENUE DEBIT

## 2019-08-18 PROCEDURE — 3331090001 HH PPS REVENUE CREDIT

## 2019-08-19 ENCOUNTER — HOME CARE VISIT (OUTPATIENT)
Dept: SCHEDULING | Facility: HOME HEALTH | Age: 44
End: 2019-08-19
Payer: MEDICARE

## 2019-08-19 VITALS
HEART RATE: 84 BPM | OXYGEN SATURATION: 99 % | RESPIRATION RATE: 20 BRPM | DIASTOLIC BLOOD PRESSURE: 80 MMHG | SYSTOLIC BLOOD PRESSURE: 122 MMHG | TEMPERATURE: 97.4 F

## 2019-08-19 VITALS
OXYGEN SATURATION: 99 % | HEART RATE: 83 BPM | TEMPERATURE: 97.8 F | SYSTOLIC BLOOD PRESSURE: 122 MMHG | DIASTOLIC BLOOD PRESSURE: 81 MMHG

## 2019-08-19 PROCEDURE — G0157 HHC PT ASSISTANT EA 15: HCPCS

## 2019-08-19 PROCEDURE — 3331090002 HH PPS REVENUE DEBIT

## 2019-08-19 PROCEDURE — 3331090001 HH PPS REVENUE CREDIT

## 2019-08-19 PROCEDURE — G0300 HHS/HOSPICE OF LPN EA 15 MIN: HCPCS

## 2019-08-20 PROCEDURE — 3331090002 HH PPS REVENUE DEBIT

## 2019-08-20 PROCEDURE — 3331090001 HH PPS REVENUE CREDIT

## 2019-08-21 PROCEDURE — 3331090001 HH PPS REVENUE CREDIT

## 2019-08-21 PROCEDURE — 3331090002 HH PPS REVENUE DEBIT

## 2019-08-22 ENCOUNTER — HOME CARE VISIT (OUTPATIENT)
Dept: SCHEDULING | Facility: HOME HEALTH | Age: 44
End: 2019-08-22
Payer: MEDICARE

## 2019-08-22 ENCOUNTER — HOME CARE VISIT (OUTPATIENT)
Dept: HOME HEALTH SERVICES | Facility: HOME HEALTH | Age: 44
End: 2019-08-22
Payer: MEDICARE

## 2019-08-22 PROCEDURE — 3331090001 HH PPS REVENUE CREDIT

## 2019-08-22 PROCEDURE — G0157 HHC PT ASSISTANT EA 15: HCPCS

## 2019-08-22 PROCEDURE — 3331090002 HH PPS REVENUE DEBIT

## 2019-08-23 ENCOUNTER — TELEPHONE (OUTPATIENT)
Dept: SURGERY | Age: 44
End: 2019-08-23

## 2019-08-23 VITALS
DIASTOLIC BLOOD PRESSURE: 40 MMHG | SYSTOLIC BLOOD PRESSURE: 99 MMHG | HEART RATE: 85 BPM | TEMPERATURE: 97.8 F | OXYGEN SATURATION: 99 %

## 2019-08-23 PROCEDURE — 3331090002 HH PPS REVENUE DEBIT

## 2019-08-23 PROCEDURE — 3331090001 HH PPS REVENUE CREDIT

## 2019-08-23 NOTE — TELEPHONE ENCOUNTER
Patients calling stating that Maya Serrano hasn't sent clinicals to [employer] and she is almost out of supplies and needs to be faxed to [phone] purchase order # [id number].

## 2019-08-23 NOTE — TELEPHONE ENCOUNTER
I returned patients call and told her I cound not get the fax to go thru on that fax# she gave me another fax# [phone]. Faxed and confirmation received. Will out in scan box.

## 2019-08-24 PROCEDURE — 3331090002 HH PPS REVENUE DEBIT

## 2019-08-24 PROCEDURE — 3331090001 HH PPS REVENUE CREDIT

## 2019-08-25 PROCEDURE — 3331090002 HH PPS REVENUE DEBIT

## 2019-08-25 PROCEDURE — 3331090001 HH PPS REVENUE CREDIT

## 2019-08-26 ENCOUNTER — TELEPHONE (OUTPATIENT)
Dept: SURGERY | Age: 44
End: 2019-08-26

## 2019-08-26 ENCOUNTER — HOME CARE VISIT (OUTPATIENT)
Dept: SCHEDULING | Facility: HOME HEALTH | Age: 44
End: 2019-08-26
Payer: MEDICARE

## 2019-08-26 ENCOUNTER — HOME CARE VISIT (OUTPATIENT)
Dept: HOME HEALTH SERVICES | Facility: HOME HEALTH | Age: 44
End: 2019-08-26
Payer: MEDICARE

## 2019-08-26 VITALS
RESPIRATION RATE: 14 BRPM | HEART RATE: 83 BPM | TEMPERATURE: 97.2 F | OXYGEN SATURATION: 99 % | SYSTOLIC BLOOD PRESSURE: 103 MMHG | DIASTOLIC BLOOD PRESSURE: 67 MMHG

## 2019-08-26 VITALS
OXYGEN SATURATION: 99 % | HEART RATE: 70 BPM | DIASTOLIC BLOOD PRESSURE: 76 MMHG | SYSTOLIC BLOOD PRESSURE: 114 MMHG | TEMPERATURE: 97.4 F | RESPIRATION RATE: 18 BRPM

## 2019-08-26 PROCEDURE — G0300 HHS/HOSPICE OF LPN EA 15 MIN: HCPCS

## 2019-08-26 PROCEDURE — 3331090001 HH PPS REVENUE CREDIT

## 2019-08-26 PROCEDURE — G0299 HHS/HOSPICE OF RN EA 15 MIN: HCPCS

## 2019-08-26 PROCEDURE — 3331090002 HH PPS REVENUE DEBIT

## 2019-08-26 PROCEDURE — G0151 HHCP-SERV OF PT,EA 15 MIN: HCPCS

## 2019-08-26 NOTE — TELEPHONE ENCOUNTER
Refaxed, again, Dr. Coby Linares office note to [phone]. Again, confirmation received. Melvina with supply company notified.

## 2019-08-26 NOTE — TELEPHONE ENCOUNTER
I returned [address] [employer] phone call and told her Fax# [phone] was not going thru and patient had given me fax # [phone] and fax had gone thru as well as to look on bottom left hand of second page for patients name.

## 2019-08-26 NOTE — TELEPHONE ENCOUNTER
Melvina calling from patients insurance stating that the supplier needs clinical notes sent over on patient and the paperwork that was sent over before recently was missing pages and did not have the members name on the pages, needs to be resent. Fax number to supplier [employer] [phone].

## 2019-08-27 PROCEDURE — 3331090002 HH PPS REVENUE DEBIT

## 2019-08-27 PROCEDURE — 3331090001 HH PPS REVENUE CREDIT

## 2019-08-28 LAB
ALBUMIN SERPL-MCNC: 3.7 G/DL (ref 3.5–5.5)
ALBUMIN/GLOB SERPL: 1.5 {RATIO} (ref 1.2–2.2)
ALP SERPL-CCNC: 120 IU/L (ref 39–117)
ALT SERPL-CCNC: 25 IU/L (ref 0–32)
AST SERPL-CCNC: 14 IU/L (ref 0–40)
BASOPHILS # BLD AUTO: 0.1 X10E3/UL (ref 0–0.2)
BASOPHILS NFR BLD AUTO: 1 %
BILIRUB SERPL-MCNC: 0.5 MG/DL (ref 0–1.2)
BUN SERPL-MCNC: 29 MG/DL (ref 6–24)
BUN/CREAT SERPL: 38 (ref 9–23)
CALCIUM SERPL-MCNC: 8.9 MG/DL (ref 8.7–10.2)
CHLORIDE SERPL-SCNC: 104 MMOL/L (ref 96–106)
CO2 SERPL-SCNC: 22 MMOL/L (ref 20–29)
CREAT SERPL-MCNC: 0.76 MG/DL (ref 0.57–1)
EOSINOPHIL # BLD AUTO: 0.1 X10E3/UL (ref 0–0.4)
EOSINOPHIL NFR BLD AUTO: 1 %
ERYTHROCYTE [DISTWIDTH] IN BLOOD BY AUTOMATED COUNT: 14.4 % (ref 12.3–15.4)
GLOBULIN SER CALC-MCNC: 2.5 G/DL (ref 1.5–4.5)
GLUCOSE SERPL-MCNC: 77 MG/DL (ref 65–99)
HCT VFR BLD AUTO: 38.3 % (ref 34–46.6)
HGB BLD-MCNC: 12.5 G/DL (ref 11.1–15.9)
IMM GRANULOCYTES # BLD AUTO: 0.2 X10E3/UL (ref 0–0.1)
IMM GRANULOCYTES NFR BLD AUTO: 2 %
LYMPHOCYTES # BLD AUTO: 3.4 X10E3/UL (ref 0.7–3.1)
LYMPHOCYTES NFR BLD AUTO: 26 %
MAGNESIUM SERPL-MCNC: 2 MG/DL (ref 1.6–2.3)
MCH RBC QN AUTO: 31.1 PG (ref 26.6–33)
MCHC RBC AUTO-ENTMCNC: 32.6 G/DL (ref 31.5–35.7)
MCV RBC AUTO: 95 FL (ref 79–97)
MONOCYTES # BLD AUTO: 0.7 X10E3/UL (ref 0.1–0.9)
MONOCYTES NFR BLD AUTO: 5 %
NEUTROPHILS # BLD AUTO: 8.8 X10E3/UL (ref 1.4–7)
NEUTROPHILS NFR BLD AUTO: 65 %
PHOSPHATE SERPL-MCNC: 2.6 MG/DL (ref 2.5–4.5)
PLATELET # BLD AUTO: 191 X10E3/UL (ref 150–450)
POTASSIUM SERPL-SCNC: 4 MMOL/L (ref 3.5–5.2)
PREALB SERPL-MCNC: 28 MG/DL (ref 12–34)
PROT SERPL-MCNC: 6.2 G/DL (ref 6–8.5)
RBC # BLD AUTO: 4.02 X10E6/UL (ref 3.77–5.28)
SODIUM SERPL-SCNC: 141 MMOL/L (ref 134–144)
TRIGL SERPL-MCNC: 149 MG/DL (ref 0–149)
WBC # BLD AUTO: 13.3 X10E3/UL (ref 3.4–10.8)

## 2019-08-28 PROCEDURE — 3331090001 HH PPS REVENUE CREDIT

## 2019-08-28 PROCEDURE — 3331090002 HH PPS REVENUE DEBIT

## 2019-08-29 ENCOUNTER — HOME CARE VISIT (OUTPATIENT)
Dept: SCHEDULING | Facility: HOME HEALTH | Age: 44
End: 2019-08-29
Payer: MEDICARE

## 2019-08-29 VITALS
DIASTOLIC BLOOD PRESSURE: 76 MMHG | TEMPERATURE: 97 F | SYSTOLIC BLOOD PRESSURE: 128 MMHG | HEART RATE: 75 BPM | OXYGEN SATURATION: 99 % | RESPIRATION RATE: 18 BRPM

## 2019-08-29 PROCEDURE — G0300 HHS/HOSPICE OF LPN EA 15 MIN: HCPCS

## 2019-08-29 PROCEDURE — 3331090002 HH PPS REVENUE DEBIT

## 2019-08-29 PROCEDURE — 3331090001 HH PPS REVENUE CREDIT

## 2019-08-30 PROCEDURE — 3331090002 HH PPS REVENUE DEBIT

## 2019-08-30 PROCEDURE — 3331090001 HH PPS REVENUE CREDIT

## 2019-08-31 PROCEDURE — 3331090002 HH PPS REVENUE DEBIT

## 2019-08-31 PROCEDURE — 3331090001 HH PPS REVENUE CREDIT

## 2019-09-01 PROCEDURE — 3331090001 HH PPS REVENUE CREDIT

## 2019-09-01 PROCEDURE — 3331090002 HH PPS REVENUE DEBIT

## 2019-09-02 ENCOUNTER — HOME CARE VISIT (OUTPATIENT)
Dept: SCHEDULING | Facility: HOME HEALTH | Age: 44
End: 2019-09-02
Payer: MEDICARE

## 2019-09-02 PROCEDURE — 3331090001 HH PPS REVENUE CREDIT

## 2019-09-02 PROCEDURE — 3331090002 HH PPS REVENUE DEBIT

## 2019-09-02 PROCEDURE — G0300 HHS/HOSPICE OF LPN EA 15 MIN: HCPCS

## 2019-09-03 PROCEDURE — 3331090001 HH PPS REVENUE CREDIT

## 2019-09-03 PROCEDURE — 3331090002 HH PPS REVENUE DEBIT

## 2019-09-04 PROCEDURE — 3331090001 HH PPS REVENUE CREDIT

## 2019-09-04 PROCEDURE — 3331090002 HH PPS REVENUE DEBIT

## 2019-09-05 ENCOUNTER — HOME CARE VISIT (OUTPATIENT)
Dept: SCHEDULING | Facility: HOME HEALTH | Age: 44
End: 2019-09-05
Payer: MEDICARE

## 2019-09-05 PROCEDURE — 3331090002 HH PPS REVENUE DEBIT

## 2019-09-05 PROCEDURE — 3331090001 HH PPS REVENUE CREDIT

## 2019-09-06 PROCEDURE — 3331090002 HH PPS REVENUE DEBIT

## 2019-09-06 PROCEDURE — 3331090001 HH PPS REVENUE CREDIT

## 2019-09-07 PROCEDURE — 3331090001 HH PPS REVENUE CREDIT

## 2019-09-07 PROCEDURE — 3331090002 HH PPS REVENUE DEBIT

## 2019-09-08 PROCEDURE — 3331090001 HH PPS REVENUE CREDIT

## 2019-09-08 PROCEDURE — 3331090002 HH PPS REVENUE DEBIT

## 2019-09-09 ENCOUNTER — HOME CARE VISIT (OUTPATIENT)
Dept: SCHEDULING | Facility: HOME HEALTH | Age: 44
End: 2019-09-09
Payer: MEDICARE

## 2019-09-09 VITALS
TEMPERATURE: 97.8 F | DIASTOLIC BLOOD PRESSURE: 70 MMHG | OXYGEN SATURATION: 99 % | RESPIRATION RATE: 18 BRPM | SYSTOLIC BLOOD PRESSURE: 106 MMHG | HEART RATE: 67 BPM

## 2019-09-09 PROCEDURE — G0300 HHS/HOSPICE OF LPN EA 15 MIN: HCPCS

## 2019-09-09 PROCEDURE — 3331090002 HH PPS REVENUE DEBIT

## 2019-09-09 PROCEDURE — 3331090001 HH PPS REVENUE CREDIT

## 2019-09-10 PROCEDURE — 3331090001 HH PPS REVENUE CREDIT

## 2019-09-10 PROCEDURE — 3331090002 HH PPS REVENUE DEBIT

## 2019-09-11 PROCEDURE — 3331090001 HH PPS REVENUE CREDIT

## 2019-09-11 PROCEDURE — 3331090002 HH PPS REVENUE DEBIT

## 2019-09-12 ENCOUNTER — HOME CARE VISIT (OUTPATIENT)
Dept: HOME HEALTH SERVICES | Facility: HOME HEALTH | Age: 44
End: 2019-09-12
Payer: MEDICARE

## 2019-09-12 PROCEDURE — 3331090001 HH PPS REVENUE CREDIT

## 2019-09-12 PROCEDURE — 3331090002 HH PPS REVENUE DEBIT

## 2019-09-13 PROCEDURE — 3331090002 HH PPS REVENUE DEBIT

## 2019-09-13 PROCEDURE — 3331090001 HH PPS REVENUE CREDIT

## 2019-09-14 PROCEDURE — 3331090001 HH PPS REVENUE CREDIT

## 2019-09-14 PROCEDURE — 3331090002 HH PPS REVENUE DEBIT

## 2019-09-15 ENCOUNTER — HOME CARE VISIT (OUTPATIENT)
Dept: SCHEDULING | Facility: HOME HEALTH | Age: 44
End: 2019-09-15
Payer: MEDICARE

## 2019-09-15 PROCEDURE — 3331090002 HH PPS REVENUE DEBIT

## 2019-09-15 PROCEDURE — 3331090003 HH PPS REVENUE ADJ

## 2019-09-15 PROCEDURE — 3331090001 HH PPS REVENUE CREDIT

## 2019-09-15 PROCEDURE — G0162 HHC RN E&M PLAN SVS, 15 MIN: HCPCS

## 2019-09-16 ENCOUNTER — HOME CARE VISIT (OUTPATIENT)
Dept: SCHEDULING | Facility: HOME HEALTH | Age: 44
End: 2019-09-16
Payer: MEDICARE

## 2019-09-16 VITALS
WEIGHT: 282.7 LBS | HEIGHT: 64 IN | SYSTOLIC BLOOD PRESSURE: 136 MMHG | TEMPERATURE: 98.3 F | OXYGEN SATURATION: 98 % | BODY MASS INDEX: 48.26 KG/M2 | HEART RATE: 83 BPM | DIASTOLIC BLOOD PRESSURE: 70 MMHG | RESPIRATION RATE: 18 BRPM

## 2019-09-16 VITALS
DIASTOLIC BLOOD PRESSURE: 86 MMHG | HEART RATE: 82 BPM | OXYGEN SATURATION: 99 % | RESPIRATION RATE: 18 BRPM | TEMPERATURE: 97 F | SYSTOLIC BLOOD PRESSURE: 132 MMHG

## 2019-09-16 PROCEDURE — G0300 HHS/HOSPICE OF LPN EA 15 MIN: HCPCS

## 2019-09-16 PROCEDURE — 400016 HH ROC

## 2019-09-16 PROCEDURE — 3331090002 HH PPS REVENUE DEBIT

## 2019-09-16 PROCEDURE — 3331090001 HH PPS REVENUE CREDIT

## 2019-09-17 PROCEDURE — 3331090001 HH PPS REVENUE CREDIT

## 2019-09-17 PROCEDURE — 3331090002 HH PPS REVENUE DEBIT

## 2019-09-18 PROCEDURE — 3331090002 HH PPS REVENUE DEBIT

## 2019-09-18 PROCEDURE — 3331090001 HH PPS REVENUE CREDIT

## 2019-09-19 ENCOUNTER — HOME CARE VISIT (OUTPATIENT)
Dept: SCHEDULING | Facility: HOME HEALTH | Age: 44
End: 2019-09-19
Payer: MEDICARE

## 2019-09-19 VITALS
SYSTOLIC BLOOD PRESSURE: 122 MMHG | RESPIRATION RATE: 18 BRPM | HEART RATE: 75 BPM | OXYGEN SATURATION: 98 % | DIASTOLIC BLOOD PRESSURE: 80 MMHG | TEMPERATURE: 97.8 F

## 2019-09-19 PROCEDURE — G0300 HHS/HOSPICE OF LPN EA 15 MIN: HCPCS

## 2019-09-19 PROCEDURE — 3331090001 HH PPS REVENUE CREDIT

## 2019-09-19 PROCEDURE — 3331090002 HH PPS REVENUE DEBIT

## 2019-09-20 ENCOUNTER — HOME CARE VISIT (OUTPATIENT)
Dept: HOME HEALTH SERVICES | Facility: HOME HEALTH | Age: 44
End: 2019-09-20
Payer: MEDICARE

## 2019-09-20 PROCEDURE — 3331090002 HH PPS REVENUE DEBIT

## 2019-09-20 PROCEDURE — 3331090001 HH PPS REVENUE CREDIT

## 2019-09-21 PROCEDURE — 3331090001 HH PPS REVENUE CREDIT

## 2019-09-21 PROCEDURE — 3331090002 HH PPS REVENUE DEBIT

## 2019-09-22 PROCEDURE — 3331090002 HH PPS REVENUE DEBIT

## 2019-09-22 PROCEDURE — 3331090001 HH PPS REVENUE CREDIT

## 2019-09-23 ENCOUNTER — HOME CARE VISIT (OUTPATIENT)
Dept: SCHEDULING | Facility: HOME HEALTH | Age: 44
End: 2019-09-23
Payer: MEDICARE

## 2019-09-23 VITALS
OXYGEN SATURATION: 99 % | SYSTOLIC BLOOD PRESSURE: 120 MMHG | TEMPERATURE: 97.3 F | RESPIRATION RATE: 18 BRPM | DIASTOLIC BLOOD PRESSURE: 72 MMHG | HEART RATE: 85 BPM

## 2019-09-23 PROCEDURE — 3331090001 HH PPS REVENUE CREDIT

## 2019-09-23 PROCEDURE — G0300 HHS/HOSPICE OF LPN EA 15 MIN: HCPCS

## 2019-09-23 PROCEDURE — 3331090002 HH PPS REVENUE DEBIT

## 2019-09-24 PROCEDURE — 3331090002 HH PPS REVENUE DEBIT

## 2019-09-24 PROCEDURE — 3331090001 HH PPS REVENUE CREDIT

## 2019-09-25 PROCEDURE — 3331090002 HH PPS REVENUE DEBIT

## 2019-09-25 PROCEDURE — 3331090001 HH PPS REVENUE CREDIT

## 2019-09-26 ENCOUNTER — HOME CARE VISIT (OUTPATIENT)
Dept: SCHEDULING | Facility: HOME HEALTH | Age: 44
End: 2019-09-26
Payer: MEDICARE

## 2019-09-26 VITALS
DIASTOLIC BLOOD PRESSURE: 84 MMHG | HEART RATE: 65 BPM | OXYGEN SATURATION: 99 % | RESPIRATION RATE: 18 BRPM | SYSTOLIC BLOOD PRESSURE: 128 MMHG | TEMPERATURE: 97.1 F

## 2019-09-26 PROCEDURE — G0300 HHS/HOSPICE OF LPN EA 15 MIN: HCPCS

## 2019-09-26 PROCEDURE — 3331090001 HH PPS REVENUE CREDIT

## 2019-09-26 PROCEDURE — 3331090002 HH PPS REVENUE DEBIT

## 2019-09-27 PROCEDURE — 3331090001 HH PPS REVENUE CREDIT

## 2019-09-27 PROCEDURE — 3331090002 HH PPS REVENUE DEBIT

## 2019-09-28 PROCEDURE — 3331090001 HH PPS REVENUE CREDIT

## 2019-09-28 PROCEDURE — 3331090002 HH PPS REVENUE DEBIT

## 2019-09-29 PROCEDURE — 3331090002 HH PPS REVENUE DEBIT

## 2019-09-29 PROCEDURE — 3331090001 HH PPS REVENUE CREDIT

## 2019-09-30 ENCOUNTER — HOME CARE VISIT (OUTPATIENT)
Dept: SCHEDULING | Facility: HOME HEALTH | Age: 44
End: 2019-09-30
Payer: MEDICARE

## 2019-09-30 PROCEDURE — G0300 HHS/HOSPICE OF LPN EA 15 MIN: HCPCS

## 2019-09-30 PROCEDURE — 3331090002 HH PPS REVENUE DEBIT

## 2019-09-30 PROCEDURE — 3331090001 HH PPS REVENUE CREDIT

## 2019-10-01 VITALS
TEMPERATURE: 97.9 F | HEART RATE: 70 BPM | RESPIRATION RATE: 18 BRPM | OXYGEN SATURATION: 99 % | SYSTOLIC BLOOD PRESSURE: 120 MMHG | DIASTOLIC BLOOD PRESSURE: 62 MMHG

## 2019-10-01 PROCEDURE — 3331090002 HH PPS REVENUE DEBIT

## 2019-10-01 PROCEDURE — 3331090001 HH PPS REVENUE CREDIT

## 2019-10-02 PROCEDURE — 3331090002 HH PPS REVENUE DEBIT

## 2019-10-02 PROCEDURE — 3331090001 HH PPS REVENUE CREDIT

## 2019-10-03 ENCOUNTER — HOME CARE VISIT (OUTPATIENT)
Dept: SCHEDULING | Facility: HOME HEALTH | Age: 44
End: 2019-10-03
Payer: MEDICARE

## 2019-10-03 PROCEDURE — 3331090001 HH PPS REVENUE CREDIT

## 2019-10-03 PROCEDURE — 3331090002 HH PPS REVENUE DEBIT

## 2019-10-03 PROCEDURE — G0300 HHS/HOSPICE OF LPN EA 15 MIN: HCPCS

## 2019-10-04 PROCEDURE — 3331090002 HH PPS REVENUE DEBIT

## 2019-10-04 PROCEDURE — 3331090001 HH PPS REVENUE CREDIT

## 2019-10-05 VITALS
HEART RATE: 68 BPM | OXYGEN SATURATION: 99 % | SYSTOLIC BLOOD PRESSURE: 118 MMHG | DIASTOLIC BLOOD PRESSURE: 68 MMHG | RESPIRATION RATE: 18 BRPM | TEMPERATURE: 97.6 F

## 2019-10-05 PROCEDURE — 3331090002 HH PPS REVENUE DEBIT

## 2019-10-05 PROCEDURE — 3331090001 HH PPS REVENUE CREDIT

## 2019-10-06 PROCEDURE — 3331090001 HH PPS REVENUE CREDIT

## 2019-10-06 PROCEDURE — 3331090002 HH PPS REVENUE DEBIT

## 2019-10-07 ENCOUNTER — HOME CARE VISIT (OUTPATIENT)
Dept: SCHEDULING | Facility: HOME HEALTH | Age: 44
End: 2019-10-07
Payer: MEDICARE

## 2019-10-07 VITALS
HEART RATE: 84 BPM | OXYGEN SATURATION: 99 % | RESPIRATION RATE: 18 BRPM | DIASTOLIC BLOOD PRESSURE: 82 MMHG | TEMPERATURE: 97.2 F | SYSTOLIC BLOOD PRESSURE: 122 MMHG

## 2019-10-07 PROCEDURE — G0300 HHS/HOSPICE OF LPN EA 15 MIN: HCPCS

## 2019-10-07 PROCEDURE — 3331090002 HH PPS REVENUE DEBIT

## 2019-10-07 PROCEDURE — 3331090001 HH PPS REVENUE CREDIT

## 2019-10-08 PROCEDURE — 3331090002 HH PPS REVENUE DEBIT

## 2019-10-08 PROCEDURE — 3331090001 HH PPS REVENUE CREDIT

## 2019-10-09 PROCEDURE — 3331090001 HH PPS REVENUE CREDIT

## 2019-10-09 PROCEDURE — 3331090002 HH PPS REVENUE DEBIT

## 2019-10-10 ENCOUNTER — HOME CARE VISIT (OUTPATIENT)
Dept: SCHEDULING | Facility: HOME HEALTH | Age: 44
End: 2019-10-10
Payer: MEDICARE

## 2019-10-10 VITALS
DIASTOLIC BLOOD PRESSURE: 78 MMHG | RESPIRATION RATE: 18 BRPM | OXYGEN SATURATION: 99 % | SYSTOLIC BLOOD PRESSURE: 116 MMHG | HEART RATE: 90 BPM | TEMPERATURE: 97.3 F

## 2019-10-10 PROCEDURE — 3331090002 HH PPS REVENUE DEBIT

## 2019-10-10 PROCEDURE — G0300 HHS/HOSPICE OF LPN EA 15 MIN: HCPCS

## 2019-10-10 PROCEDURE — 3331090001 HH PPS REVENUE CREDIT

## 2019-10-11 PROCEDURE — 3331090001 HH PPS REVENUE CREDIT

## 2019-10-11 PROCEDURE — 3331090002 HH PPS REVENUE DEBIT

## 2019-10-12 PROCEDURE — 3331090002 HH PPS REVENUE DEBIT

## 2019-10-12 PROCEDURE — 3331090001 HH PPS REVENUE CREDIT

## 2019-10-13 PROCEDURE — 3331090001 HH PPS REVENUE CREDIT

## 2019-10-13 PROCEDURE — 3331090002 HH PPS REVENUE DEBIT

## 2019-10-14 ENCOUNTER — HOME CARE VISIT (OUTPATIENT)
Dept: SCHEDULING | Facility: HOME HEALTH | Age: 44
End: 2019-10-14
Payer: MEDICARE

## 2019-10-14 PROCEDURE — G0300 HHS/HOSPICE OF LPN EA 15 MIN: HCPCS

## 2019-10-14 PROCEDURE — 3331090002 HH PPS REVENUE DEBIT

## 2019-10-14 PROCEDURE — 3331090001 HH PPS REVENUE CREDIT

## 2019-10-15 PROCEDURE — 3331090002 HH PPS REVENUE DEBIT

## 2019-10-15 PROCEDURE — 3331090001 HH PPS REVENUE CREDIT

## 2019-10-16 PROCEDURE — 3331090001 HH PPS REVENUE CREDIT

## 2019-10-16 PROCEDURE — 3331090002 HH PPS REVENUE DEBIT

## 2019-10-16 NOTE — WOUND CARE
1. We will call you between 3pm to 7pm 1 day before the date of your surgery to determine that arrival time for your procedure.   2. Please report to outpatient unit as directed on the day of your procedure.   3. Please follow the following fasting guidelines:   · Nothing to eat or drink 8 hours prior to arrival   4. Early on the morning of the procedure please do not take medication     5. Other Instructions: body wash as directed   6. If you develop a cold, cough, fever, rash, or other symptom prior to the data of the procedure, please report it to your physician immediately.   7. If you need to cancel the procedure for any reason, please contact your physician or call the unit listed above.   8. Make arrangements to have someone drive you home from the procedure. If you have not arranged for transportation home, your surgery may be cancelled.    9. You may not take public transportation unless accompanied by a responsible person.   10. You may not drive a car or operate complex or potentially dangerous machinery for 24 hours following anesthesia and/or sedation.   11. If it is medically necessary for you to have a longer stay, you will be informed as soon as the decision is made.   12. Do not wear or being anything of value to the hospital including jewelry of any kind. Do not wear make-up or contact lenses. DO bring your glasses and hearing aid.   13. Dress in comfortable clothes.   14.  If instructed, please bring a copy of your Advanced Directive (Living Will/Durable Power of ) on the day of your procedure.      Pre operative instructions given as per protocol.  Form explained by:      I have read and understand the above information. I have had sufficient opportunity to ask questions I might have and they have been answered to my satisfaction. I agree to comply with the Patient Responsibilities listed above and have received a copy of this form.           WOCN Note:     Follow-up visit for abdominal wound. Chart shows:  Admitted for fistula takdown 3/7/18 by Dr. Jennifer Saleh with [facility] placement in [address] [place] [address] Street; history of multiple abdominal surgeries and Crohns disease. Admitted from home. Hgb: 8      Assessment:   Patient is A&O x 4 and mobile but debilitated s/p abdominal surgery - reports working with PT today and sat up for a while. Bed: total care bariatric   Pure Wick in use. Using PCA for pain.       1. Midline abdominal surgical wound = 24 x 15 x 5 cm. 85% pink moist wound base and 10% scattered devitalized stained areas of green and 5% dark burgundy in proximal end. Loose sutures noted today and topography of base is now rounded outward with \"fingers of tissue that appear to be where sutures released. No malodor. Base of wound was concave with well approximated suture line and today is rounded outward with loose sutures. ~200 cc of dark green in wall cannister that is connected via red rubber cath resting in distal wound bed. The gauze packing was saturated with green drainage. Red rubber cath is protected by a \"sandwich\" of Mepitel One and placed in bottom/lower end of wound bed. 2 rolls of moist gauze packing placed around and red rubber secured to abdomen with tape and connected to wall suction @ 50. ABD toppers and secured with tape.      Colostomy: stoma is red & moist; Small amount of mucoid green output; 2-piece flat pouch with good seal and not changed by me today.      Wound Recommendations:    Continue wound care as ordered. Our largest pouch will not cover this size opening. Skin Care & Pressure Relief Recommendations:  Minimize layers of linen/pads under patient to optimize support surface. Turn/reposition approximately every 2 hours and offload heels. Manage incontinence / promote continence;  Aloe Vesta to buttocks and sacrum daily and as needed  Bariatric bed    Discussed above plan with patient & RN. Transition of Care: Plan to follow as needed while admitted to hospital.    SONY Larson, RN, [facility]  Certified Wound, Ostomy, Continence Nurse  office [phone]  pager [phone] or call  to page    4061: return visit with Dr. Deejay Castañeda and Orelia Hatchet, JODI, at bedside to observe wound. Dr. Deejay Castañeda removed a few loose sutures. Discussed current management and plan to continue with current dressing.     AMPARO Larson

## 2019-10-17 ENCOUNTER — HOME CARE VISIT (OUTPATIENT)
Dept: HOME HEALTH SERVICES | Facility: HOME HEALTH | Age: 44
End: 2019-10-17
Payer: MEDICARE

## 2019-10-17 PROCEDURE — 3331090002 HH PPS REVENUE DEBIT

## 2019-10-17 PROCEDURE — 3331090001 HH PPS REVENUE CREDIT

## 2019-10-18 ENCOUNTER — TELEPHONE (OUTPATIENT)
Dept: SURGERY | Age: 44
End: 2019-10-18

## 2019-10-18 PROCEDURE — 3331090002 HH PPS REVENUE DEBIT

## 2019-10-18 PROCEDURE — 3331090001 HH PPS REVENUE CREDIT

## 2019-10-18 NOTE — TELEPHONE ENCOUNTER
I returned patients call and she states her PICC line will take TPN but wont give blood return and the home health nurse said she needs an order for Activase (?). I told her I would route this message to Dr. Todd Solis and see what he would like to do.

## 2019-10-18 NOTE — PROGRESS NOTES
Pharmacist Note - Vancomycin Dosing  Therapy day 15  Indication:  Surgical site infection / ischemic bowel S/P SMA stenting   Current regimen:  1.75 gm IV Q 24 hr    A Trough Level resulted at 18 mcg/mL which was obtained 24 hrs post-dose. Goal trough: 10-15 mcg/mL     Plan: Change to vancomycin 1250mg IV Q24h . Pharmacy will continue to monitor this patient daily for changes in clinical status and renal function. Toi Hussein  Male, 57 year old, 1962    FYI   Received: Today   Message Contents   Tremayne Olmos Nurse Msg Nassar Cc: Richa Houston             Hi,     Attempts to reach patient via phone call  in regards to scheduling open access colonoscopy have been unsuccessful, a letter will be sent to patient in conjunction with this message.     Thanks,   OA Surgery Scheduling

## 2019-10-19 VITALS
SYSTOLIC BLOOD PRESSURE: 104 MMHG | TEMPERATURE: 97.8 F | OXYGEN SATURATION: 99 % | HEART RATE: 93 BPM | RESPIRATION RATE: 18 BRPM | DIASTOLIC BLOOD PRESSURE: 70 MMHG

## 2019-10-19 PROCEDURE — 3331090001 HH PPS REVENUE CREDIT

## 2019-10-19 PROCEDURE — 3331090002 HH PPS REVENUE DEBIT

## 2019-10-20 PROCEDURE — 3331090002 HH PPS REVENUE DEBIT

## 2019-10-20 PROCEDURE — 3331090001 HH PPS REVENUE CREDIT

## 2019-10-21 ENCOUNTER — HOME CARE VISIT (OUTPATIENT)
Dept: SCHEDULING | Facility: HOME HEALTH | Age: 44
End: 2019-10-21
Payer: MEDICARE

## 2019-10-21 VITALS
OXYGEN SATURATION: 99 % | RESPIRATION RATE: 18 BRPM | DIASTOLIC BLOOD PRESSURE: 72 MMHG | TEMPERATURE: 97.6 F | SYSTOLIC BLOOD PRESSURE: 108 MMHG | HEART RATE: 79 BPM

## 2019-10-21 PROCEDURE — 3331090001 HH PPS REVENUE CREDIT

## 2019-10-21 PROCEDURE — 3331090002 HH PPS REVENUE DEBIT

## 2019-10-21 PROCEDURE — G0300 HHS/HOSPICE OF LPN EA 15 MIN: HCPCS

## 2019-10-22 ENCOUNTER — TELEPHONE (OUTPATIENT)
Dept: SURGERY | Age: 44
End: 2019-10-22

## 2019-10-22 DIAGNOSIS — T82.898D OCCLUSION OF PERIPHERALLY INSERTED CENTRAL CATHETER (PICC) LINE, SUBSEQUENT ENCOUNTER: Primary | ICD-10-CM

## 2019-10-22 PROCEDURE — 3331090001 HH PPS REVENUE CREDIT

## 2019-10-22 PROCEDURE — 3331090002 HH PPS REVENUE DEBIT

## 2019-10-22 NOTE — PROGRESS NOTES
Order placed for [facility] to flush PICC with Activase, PICC line occlusion and no blood return

## 2019-10-22 NOTE — TELEPHONE ENCOUNTER
I returned call from [address] and she states she was not able to get a blood draw from PICC line for labs today. She states patient 'needs to go to out patient for activase'. I told her I would discuss with Dr. Junius Brunner and let patient know. Dr. Junius Brunner ok'ed. Bernard Hammond NP will put in new order for Activase thru Out patient infusion center. I faxed order to [phone] and received confirmation. This will be scanned into the system. I called Out patient [facility] [phone] and spoke with Afshan Larson and she said to call Coordination of care [phone]. I called this number and got Nydia who said she would check the system for the order and get back with me. I then called the patient and gave her both numbers [phone] and [phone] and told her someone should be in touch to schedule this but if not she could call those numbers and follow up on it.

## 2019-10-23 ENCOUNTER — HOSPITAL ENCOUNTER (OUTPATIENT)
Dept: INFUSION THERAPY | Age: 44
Discharge: HOME | End: 2019-10-23
Payer: MEDICARE

## 2019-10-23 VITALS
WEIGHT: 276.8 LBS | SYSTOLIC BLOOD PRESSURE: 123 MMHG | BODY MASS INDEX: 47.51 KG/M2 | TEMPERATURE: 97.9 F | RESPIRATION RATE: 18 BRPM | OXYGEN SATURATION: 100 % | HEART RATE: 94 BPM | DIASTOLIC BLOOD PRESSURE: 79 MMHG

## 2019-10-23 PROCEDURE — 3331090002 HH PPS REVENUE DEBIT

## 2019-10-23 PROCEDURE — 74011000250 HC RX REV CODE- 250: Performed by: NURSE PRACTITIONER

## 2019-10-23 PROCEDURE — 3331090001 HH PPS REVENUE CREDIT

## 2019-10-23 PROCEDURE — 36593 DECLOT VASCULAR DEVICE: CPT

## 2019-10-23 PROCEDURE — 74011250636 HC RX REV CODE- 250/636: Performed by: NURSE PRACTITIONER

## 2019-10-23 RX ADMIN — ALTEPLASE 2 MG: 2.2 INJECTION, POWDER, LYOPHILIZED, FOR SOLUTION INTRAVENOUS at 14:50

## 2019-10-23 NOTE — PROGRESS NOTES
[facility] OPIC SHORT VISIT NOTE    1600 Pt arrived to [facility] ambulatory and in no distress for PICC line declot. Denies any new complaints. Right double lumen PICC flushed per protocol with no blood return, Alteplase instilled and dwelled for 90 minutes, blood returned from both lumens noted. PICC flushed per protocol, end caps changed, and new Curos caps applied. /79   Pulse 94   Temp 97.9 °F (36.6 °C)   Resp 18   Wt 125.6 kg (276 lb 12.8 oz)   SpO2 100%   Breastfeeding? No   BMI 47.51 kg/m²      Medication given:  Medications Administered     alteplase (CATHFLO) 2 mg in sterile water (preservative free) 2 mL injection     Admin Date  10/23/2019 Action  Given Dose  2 mg Route  InterCATHeter Administered By  Jefferson Casper, RN                401 Discharged home ambulatory and in no distress. Tolerated treatment well. Patient to follow up with provider for future OPIC appointments.

## 2019-10-24 ENCOUNTER — HOME CARE VISIT (OUTPATIENT)
Dept: SCHEDULING | Facility: HOME HEALTH | Age: 44
End: 2019-10-24
Payer: MEDICARE

## 2019-10-24 ENCOUNTER — HOME CARE VISIT (OUTPATIENT)
Dept: HOME HEALTH SERVICES | Facility: HOME HEALTH | Age: 44
End: 2019-10-24
Payer: MEDICARE

## 2019-10-24 PROCEDURE — 3331090002 HH PPS REVENUE DEBIT

## 2019-10-24 PROCEDURE — 3331090001 HH PPS REVENUE CREDIT

## 2019-10-24 PROCEDURE — G0299 HHS/HOSPICE OF RN EA 15 MIN: HCPCS

## 2019-10-25 LAB
ALBUMIN SERPL-MCNC: 3.2 G/DL (ref 3.5–5.5)
ALBUMIN/GLOB SERPL: 1.2 {RATIO} (ref 1.2–2.2)
ALP SERPL-CCNC: 100 IU/L (ref 39–117)
ALT SERPL-CCNC: 23 IU/L (ref 0–32)
AST SERPL-CCNC: 29 IU/L (ref 0–40)
BASOPHILS # BLD AUTO: 0 X10E3/UL (ref 0–0.2)
BASOPHILS NFR BLD AUTO: 0 %
BILIRUB SERPL-MCNC: 0.4 MG/DL (ref 0–1.2)
BUN SERPL-MCNC: 14 MG/DL (ref 6–24)
BUN/CREAT SERPL: 20 (ref 9–23)
CALCIUM SERPL-MCNC: 9 MG/DL (ref 8.7–10.2)
CHLORIDE SERPL-SCNC: 104 MMOL/L (ref 96–106)
CO2 SERPL-SCNC: 25 MMOL/L (ref 20–29)
CREAT SERPL-MCNC: 0.69 MG/DL (ref 0.57–1)
EOSINOPHIL # BLD AUTO: 0.1 X10E3/UL (ref 0–0.4)
EOSINOPHIL NFR BLD AUTO: 1 %
ERYTHROCYTE [DISTWIDTH] IN BLOOD BY AUTOMATED COUNT: 13.5 % (ref 12.3–15.4)
GLOBULIN SER CALC-MCNC: 2.6 G/DL (ref 1.5–4.5)
GLUCOSE SERPL-MCNC: 135 MG/DL (ref 65–99)
HCT VFR BLD AUTO: 33.8 % (ref 34–46.6)
HGB BLD-MCNC: 11.5 G/DL (ref 11.1–15.9)
IMM GRANULOCYTES # BLD AUTO: 0.1 X10E3/UL (ref 0–0.1)
IMM GRANULOCYTES NFR BLD AUTO: 1 %
LYMPHOCYTES # BLD AUTO: 3.1 X10E3/UL (ref 0.7–3.1)
LYMPHOCYTES NFR BLD AUTO: 34 %
MAGNESIUM SERPL-MCNC: 1.7 MG/DL (ref 1.6–2.3)
MCH RBC QN AUTO: 32.6 PG (ref 26.6–33)
MCHC RBC AUTO-ENTMCNC: 34 G/DL (ref 31.5–35.7)
MCV RBC AUTO: 96 FL (ref 79–97)
MONOCYTES # BLD AUTO: 0.6 X10E3/UL (ref 0.1–0.9)
MONOCYTES NFR BLD AUTO: 6 %
NEUTROPHILS # BLD AUTO: 5.3 X10E3/UL (ref 1.4–7)
NEUTROPHILS NFR BLD AUTO: 58 %
PHOSPHATE SERPL-MCNC: 4.1 MG/DL (ref 2.5–4.5)
PLATELET # BLD AUTO: 273 X10E3/UL (ref 150–450)
POTASSIUM SERPL-SCNC: 4.6 MMOL/L (ref 3.5–5.2)
PREALB SERPL-MCNC: 28 MG/DL (ref 12–34)
PROT SERPL-MCNC: 5.8 G/DL (ref 6–8.5)
RBC # BLD AUTO: 3.53 X10E6/UL (ref 3.77–5.28)
SODIUM SERPL-SCNC: 143 MMOL/L (ref 134–144)
TRIGL SERPL-MCNC: 110 MG/DL (ref 0–149)
WBC # BLD AUTO: 9.1 X10E3/UL (ref 3.4–10.8)

## 2019-10-25 PROCEDURE — 3331090002 HH PPS REVENUE DEBIT

## 2019-10-25 PROCEDURE — 3331090001 HH PPS REVENUE CREDIT

## 2019-10-26 PROCEDURE — 3331090001 HH PPS REVENUE CREDIT

## 2019-10-26 PROCEDURE — 3331090002 HH PPS REVENUE DEBIT

## 2019-10-27 PROCEDURE — 3331090001 HH PPS REVENUE CREDIT

## 2019-10-27 PROCEDURE — 3331090002 HH PPS REVENUE DEBIT

## 2019-10-28 ENCOUNTER — HOME CARE VISIT (OUTPATIENT)
Dept: SCHEDULING | Facility: HOME HEALTH | Age: 44
End: 2019-10-28
Payer: MEDICARE

## 2019-10-28 VITALS
SYSTOLIC BLOOD PRESSURE: 122 MMHG | HEART RATE: 76 BPM | TEMPERATURE: 97.4 F | RESPIRATION RATE: 18 BRPM | DIASTOLIC BLOOD PRESSURE: 78 MMHG | OXYGEN SATURATION: 99 %

## 2019-10-28 PROCEDURE — 3331090001 HH PPS REVENUE CREDIT

## 2019-10-28 PROCEDURE — G0300 HHS/HOSPICE OF LPN EA 15 MIN: HCPCS

## 2019-10-28 PROCEDURE — 3331090002 HH PPS REVENUE DEBIT

## 2019-10-29 VITALS
HEART RATE: 78 BPM | DIASTOLIC BLOOD PRESSURE: 81 MMHG | TEMPERATURE: 97 F | SYSTOLIC BLOOD PRESSURE: 120 MMHG | OXYGEN SATURATION: 99 % | RESPIRATION RATE: 16 BRPM

## 2019-10-29 PROCEDURE — 3331090001 HH PPS REVENUE CREDIT

## 2019-10-29 PROCEDURE — 3331090002 HH PPS REVENUE DEBIT

## 2019-10-30 PROCEDURE — 3331090002 HH PPS REVENUE DEBIT

## 2019-10-30 PROCEDURE — 3331090001 HH PPS REVENUE CREDIT

## 2019-10-31 ENCOUNTER — HOME CARE VISIT (OUTPATIENT)
Dept: SCHEDULING | Facility: HOME HEALTH | Age: 44
End: 2019-10-31
Payer: MEDICARE

## 2019-10-31 PROCEDURE — 3331090001 HH PPS REVENUE CREDIT

## 2019-10-31 PROCEDURE — G0300 HHS/HOSPICE OF LPN EA 15 MIN: HCPCS

## 2019-10-31 PROCEDURE — 3331090002 HH PPS REVENUE DEBIT

## 2019-11-01 PROCEDURE — 3331090001 HH PPS REVENUE CREDIT

## 2019-11-01 PROCEDURE — 3331090002 HH PPS REVENUE DEBIT

## 2019-11-02 PROCEDURE — 3331090002 HH PPS REVENUE DEBIT

## 2019-11-02 PROCEDURE — 3331090001 HH PPS REVENUE CREDIT

## 2019-11-03 VITALS
TEMPERATURE: 97.6 F | HEART RATE: 72 BPM | SYSTOLIC BLOOD PRESSURE: 112 MMHG | DIASTOLIC BLOOD PRESSURE: 68 MMHG | OXYGEN SATURATION: 99 % | RESPIRATION RATE: 18 BRPM

## 2019-11-03 PROCEDURE — 3331090002 HH PPS REVENUE DEBIT

## 2019-11-03 PROCEDURE — 3331090001 HH PPS REVENUE CREDIT

## 2019-11-04 ENCOUNTER — HOME CARE VISIT (OUTPATIENT)
Dept: SCHEDULING | Facility: HOME HEALTH | Age: 44
End: 2019-11-04
Payer: MEDICARE

## 2019-11-04 VITALS
OXYGEN SATURATION: 99 % | RESPIRATION RATE: 18 BRPM | HEART RATE: 88 BPM | SYSTOLIC BLOOD PRESSURE: 102 MMHG | DIASTOLIC BLOOD PRESSURE: 70 MMHG | TEMPERATURE: 97.6 F

## 2019-11-04 PROCEDURE — G0300 HHS/HOSPICE OF LPN EA 15 MIN: HCPCS

## 2019-11-04 PROCEDURE — 3331090002 HH PPS REVENUE DEBIT

## 2019-11-04 PROCEDURE — 3331090001 HH PPS REVENUE CREDIT

## 2019-11-05 PROCEDURE — 3331090002 HH PPS REVENUE DEBIT

## 2019-11-05 PROCEDURE — 3331090001 HH PPS REVENUE CREDIT

## 2019-11-06 PROCEDURE — 3331090002 HH PPS REVENUE DEBIT

## 2019-11-06 PROCEDURE — 3331090001 HH PPS REVENUE CREDIT

## 2019-11-07 ENCOUNTER — HOME CARE VISIT (OUTPATIENT)
Dept: SCHEDULING | Facility: HOME HEALTH | Age: 44
End: 2019-11-07
Payer: MEDICARE

## 2019-11-07 ENCOUNTER — TELEPHONE (OUTPATIENT)
Dept: SURGERY | Age: 44
End: 2019-11-07

## 2019-11-07 DIAGNOSIS — T82.898D OCCLUSION OF PERIPHERALLY INSERTED CENTRAL CATHETER (PICC) LINE, SUBSEQUENT ENCOUNTER: Primary | ICD-10-CM

## 2019-11-07 PROCEDURE — 3331090002 HH PPS REVENUE DEBIT

## 2019-11-07 PROCEDURE — G0300 HHS/HOSPICE OF LPN EA 15 MIN: HCPCS

## 2019-11-07 PROCEDURE — 3331090001 HH PPS REVENUE CREDIT

## 2019-11-07 NOTE — TELEPHONE ENCOUNTER
Please call Sheila Pritchard from [facility] concerning patients leg swelling.

## 2019-11-07 NOTE — TELEPHONE ENCOUNTER
I returned call from [address] who is stating patients leg is swelling and she wants to order some compression stockings. I told her  is out of town and that they should call her PCP to see if he wants to check for any DVT. The patient is in the background stating she has had a DVT before and this is not like that. I again reinerated she should call the PCP for further evaluation of leg swelling. The patient also states from the background that she is not getting any blood return again form her PICC line. This was just taken car of on 10/22/19. I told her I would see about getting another order for that and scheduling would be calling her back to schedule. Order was faxed to [phone] and confirmations was received. I then called  coordination of care [phone] and spoke with Irene Kwok who said she received the fax nda will contact the patient.

## 2019-11-08 PROCEDURE — 3331090001 HH PPS REVENUE CREDIT

## 2019-11-08 PROCEDURE — 3331090002 HH PPS REVENUE DEBIT

## 2019-11-09 ENCOUNTER — HOSPITAL ENCOUNTER (OUTPATIENT)
Dept: CT IMAGING | Age: 44
Discharge: HOME | End: 2019-11-09
Attending: INTERNAL MEDICINE
Payer: MEDICARE

## 2019-11-09 VITALS
OXYGEN SATURATION: 99 % | TEMPERATURE: 98.3 F | RESPIRATION RATE: 18 BRPM | DIASTOLIC BLOOD PRESSURE: 70 MMHG | SYSTOLIC BLOOD PRESSURE: 114 MMHG | HEART RATE: 76 BPM

## 2019-11-09 DIAGNOSIS — N82.4 FISTULA, ENTEROVAGINAL: ICD-10-CM

## 2019-11-09 DIAGNOSIS — K50.80 CROHN'S DISEASE OF SMALL AND LARGE INTESTINES (HCC): ICD-10-CM

## 2019-11-09 DIAGNOSIS — R10.32 LLQ PAIN: ICD-10-CM

## 2019-11-09 DIAGNOSIS — L73.2 HIDRADENITIS SUPPURATIVA: ICD-10-CM

## 2019-11-09 PROCEDURE — 74011636320 HC RX REV CODE- 636/320: Performed by: RADIOLOGY

## 2019-11-09 PROCEDURE — 74177 CT ABD & PELVIS W/CONTRAST: CPT

## 2019-11-09 PROCEDURE — 3331090002 HH PPS REVENUE DEBIT

## 2019-11-09 PROCEDURE — 3331090001 HH PPS REVENUE CREDIT

## 2019-11-09 RX ADMIN — IOPAMIDOL 100 ML: 755 INJECTION, SOLUTION INTRAVENOUS at 12:26

## 2019-11-10 PROCEDURE — 3331090001 HH PPS REVENUE CREDIT

## 2019-11-10 PROCEDURE — 3331090002 HH PPS REVENUE DEBIT

## 2019-11-11 ENCOUNTER — HOME CARE VISIT (OUTPATIENT)
Dept: SCHEDULING | Facility: HOME HEALTH | Age: 44
End: 2019-11-11
Payer: MEDICARE

## 2019-11-11 ENCOUNTER — TELEPHONE (OUTPATIENT)
Dept: SURGERY | Age: 44
End: 2019-11-11

## 2019-11-11 ENCOUNTER — HOSPITAL ENCOUNTER (OUTPATIENT)
Dept: INFUSION THERAPY | Age: 44
Discharge: HOME | End: 2019-11-11
Payer: MEDICARE

## 2019-11-11 VITALS
DIASTOLIC BLOOD PRESSURE: 77 MMHG | OXYGEN SATURATION: 99 % | RESPIRATION RATE: 18 BRPM | HEART RATE: 80 BPM | TEMPERATURE: 97.4 F | SYSTOLIC BLOOD PRESSURE: 128 MMHG

## 2019-11-11 VITALS
RESPIRATION RATE: 18 BRPM | HEART RATE: 82 BPM | TEMPERATURE: 97.5 F | DIASTOLIC BLOOD PRESSURE: 80 MMHG | SYSTOLIC BLOOD PRESSURE: 120 MMHG | OXYGEN SATURATION: 99 %

## 2019-11-11 PROCEDURE — G0300 HHS/HOSPICE OF LPN EA 15 MIN: HCPCS

## 2019-11-11 PROCEDURE — 36593 DECLOT VASCULAR DEVICE: CPT

## 2019-11-11 PROCEDURE — 3331090002 HH PPS REVENUE DEBIT

## 2019-11-11 PROCEDURE — 3331090001 HH PPS REVENUE CREDIT

## 2019-11-11 PROCEDURE — 74011250636 HC RX REV CODE- 250/636: Performed by: NURSE PRACTITIONER

## 2019-11-11 RX ADMIN — ALTEPLASE 2 MG: 2.2 INJECTION, POWDER, LYOPHILIZED, FOR SOLUTION INTRAVENOUS at 11:47

## 2019-11-11 RX ADMIN — ALTEPLASE 2 MG: 2.2 INJECTION, POWDER, LYOPHILIZED, FOR SOLUTION INTRAVENOUS at 10:30

## 2019-11-11 NOTE — PROGRESS NOTES
Outpatient Infusion Center Short Visit Progress Note    1010 Patient admitted to [facility] for Declot of PICC line via wheelchair  in stable condition. Assessment completed. No new concerns voiced. Vital Signs:  Visit Vitals  /77   Pulse 80   Temp 97.4 °F (36.3 °C)   Resp 18   SpO2 99%   Breastfeeding? No         Double lumen right PICC flushed per protocol with no blood return. Alteplase placed in both lumens. After a dwell time of 2 hours PICC line still had no blood return in either lumen. Patient to follow up with provider for further instructions. Lab Results:  N/A      Medications:  Medications Administered     alteplase (CATHFLO) injection 2 mg     Admin Date  11/11/2019 Action  Given Dose  2 mg Route  InterCATHeter Administered By  Cynthia Ghosh RN           Admin Date  11/11/2019 Action  Given Dose  2 mg Route  InterCATHeter Administered By  Cynthia Ghosh, MAURI                 Patient tolerated treatment well. Patient discharged from [place] [address] via wheelchair in no distress at 1300. Patient to follow up with provider for future OPIC appointments.     Future Appointments   Date Time Provider Vero Kamara   11/11/2019  4:00 PM Yfn Quiñonez LPN [address]   11/14/2019 To Be Determined Lluvia Antoine RN [facility] [place] [address]

## 2019-11-11 NOTE — TELEPHONE ENCOUNTER
Patient called stating she wanted to let nurse that [employer] sent 2 faxes over and if Dr. Leticia Patricio could sign the orders next week.

## 2019-11-11 NOTE — TELEPHONE ENCOUNTER
I returned patients call and and she is at [address] [place] [address] getting her PICC Activase. I spoke with Reji Lanza in the dept. ([phone])LYNSEY she is asking for both lines tp be flushed. Order was faxed ([phone]) , confirmation received and scanned .

## 2019-11-12 PROCEDURE — 3331090002 HH PPS REVENUE DEBIT

## 2019-11-12 PROCEDURE — 3331090001 HH PPS REVENUE CREDIT

## 2019-11-13 PROCEDURE — 3331090001 HH PPS REVENUE CREDIT

## 2019-11-13 PROCEDURE — 3331090002 HH PPS REVENUE DEBIT

## 2019-11-14 ENCOUNTER — HOME CARE VISIT (OUTPATIENT)
Dept: SCHEDULING | Facility: HOME HEALTH | Age: 44
End: 2019-11-14
Payer: MEDICARE

## 2019-11-14 VITALS
OXYGEN SATURATION: 99 % | DIASTOLIC BLOOD PRESSURE: 64 MMHG | RESPIRATION RATE: 14 BRPM | SYSTOLIC BLOOD PRESSURE: 98 MMHG | TEMPERATURE: 97.7 F | HEART RATE: 66 BPM

## 2019-11-14 PROCEDURE — 3331090003 HH PPS REVENUE ADJ

## 2019-11-14 PROCEDURE — 3331090002 HH PPS REVENUE DEBIT

## 2019-11-14 PROCEDURE — 3331090001 HH PPS REVENUE CREDIT

## 2019-11-14 PROCEDURE — G0299 HHS/HOSPICE OF RN EA 15 MIN: HCPCS

## 2019-11-15 PROCEDURE — 3331090001 HH PPS REVENUE CREDIT

## 2019-11-15 PROCEDURE — 3331090002 HH PPS REVENUE DEBIT

## 2019-11-16 PROCEDURE — 3331090002 HH PPS REVENUE DEBIT

## 2019-11-16 PROCEDURE — 3331090001 HH PPS REVENUE CREDIT

## 2019-11-17 PROCEDURE — 3331090002 HH PPS REVENUE DEBIT

## 2019-11-17 PROCEDURE — 3331090001 HH PPS REVENUE CREDIT

## 2019-11-18 ENCOUNTER — HOME CARE VISIT (OUTPATIENT)
Dept: SCHEDULING | Facility: HOME HEALTH | Age: 44
End: 2019-11-18
Payer: MEDICARE

## 2019-11-18 VITALS
TEMPERATURE: 97.2 F | DIASTOLIC BLOOD PRESSURE: 76 MMHG | HEART RATE: 75 BPM | OXYGEN SATURATION: 99 % | RESPIRATION RATE: 18 BRPM | SYSTOLIC BLOOD PRESSURE: 116 MMHG

## 2019-11-18 PROCEDURE — 400014 HH F/U

## 2019-11-18 PROCEDURE — 3331090002 HH PPS REVENUE DEBIT

## 2019-11-18 PROCEDURE — 3331090001 HH PPS REVENUE CREDIT

## 2019-11-18 PROCEDURE — G0300 HHS/HOSPICE OF LPN EA 15 MIN: HCPCS

## 2019-11-19 ENCOUNTER — TELEPHONE (OUTPATIENT)
Dept: SURGERY | Age: 44
End: 2019-11-19

## 2019-11-19 PROCEDURE — 3331090002 HH PPS REVENUE DEBIT

## 2019-11-19 PROCEDURE — 3331090001 HH PPS REVENUE CREDIT

## 2019-11-19 NOTE — TELEPHONE ENCOUNTER
Please call pt back regarding her Picc Line. Pt stated that she needs to have her Picc lIne re-inserted. Stated it was not working and it is sticking out.

## 2019-11-20 PROCEDURE — 3331090001 HH PPS REVENUE CREDIT

## 2019-11-20 PROCEDURE — 3331090002 HH PPS REVENUE DEBIT

## 2019-11-21 ENCOUNTER — HOME CARE VISIT (OUTPATIENT)
Dept: SCHEDULING | Facility: HOME HEALTH | Age: 44
End: 2019-11-21
Payer: MEDICARE

## 2019-11-21 ENCOUNTER — TELEPHONE (OUTPATIENT)
Dept: SURGERY | Age: 44
End: 2019-11-21

## 2019-11-21 VITALS
DIASTOLIC BLOOD PRESSURE: 80 MMHG | SYSTOLIC BLOOD PRESSURE: 122 MMHG | RESPIRATION RATE: 18 BRPM | OXYGEN SATURATION: 99 % | TEMPERATURE: 97.2 F | HEART RATE: 84 BPM

## 2019-11-21 DIAGNOSIS — T82.524A DISPLACEMENT OF PERIPHERALLY INSERTED CENTRAL VENOUS CATHETER (PICC) (HCC): ICD-10-CM

## 2019-11-21 DIAGNOSIS — E46 PROTEIN-CALORIE MALNUTRITION, UNSPECIFIED SEVERITY (HCC): Primary | ICD-10-CM

## 2019-11-21 PROCEDURE — G0300 HHS/HOSPICE OF LPN EA 15 MIN: HCPCS

## 2019-11-21 PROCEDURE — 3331090002 HH PPS REVENUE DEBIT

## 2019-11-21 PROCEDURE — 3331090001 HH PPS REVENUE CREDIT

## 2019-11-21 NOTE — TELEPHONE ENCOUNTER
I called the patient and she said her PICC line is hanging out 8 cm and it is not sutured in she alos said she tried the Activase 2 weeks ago and it did not work and she needs her PICC line replaced. I spoke with TYRONE Vega who said order can be placed to IR to have PICC replaced. I told her Coordination of care will be calling her. Pt in agreement.

## 2019-11-22 ENCOUNTER — TELEPHONE (OUTPATIENT)
Dept: SURGERY | Age: 44
End: 2019-11-22

## 2019-11-22 ENCOUNTER — HOSPITAL ENCOUNTER (OUTPATIENT)
Dept: GENERAL RADIOLOGY | Age: 44
Discharge: HOME | End: 2019-11-22
Attending: SURGERY

## 2019-11-22 DIAGNOSIS — E46 PROTEIN-CALORIE MALNUTRITION, UNSPECIFIED SEVERITY (HCC): ICD-10-CM

## 2019-11-22 DIAGNOSIS — T82.524A DISPLACEMENT OF PERIPHERALLY INSERTED CENTRAL VENOUS CATHETER (PICC) (HCC): ICD-10-CM

## 2019-11-22 PROCEDURE — 3331090002 HH PPS REVENUE DEBIT

## 2019-11-22 PROCEDURE — 3331090001 HH PPS REVENUE CREDIT

## 2019-11-22 NOTE — TELEPHONE ENCOUNTER
Please call Ousmane Siddiqi  From [facility] Picc Team. Stated that patient might need a home cathter and needs orders.

## 2019-11-22 NOTE — PROGRESS NOTES
VAT    Order for PICC line, attempt a line exchange over current PICC line with guidewire, vessel vasoconstricted and unable to advance catheter. Difficulty removing catheter as well. New attempt made on R brachial vein, also vasoconstricted, unable to assess. Dr Rakesh Brady here to assess with US, recommends Freddy catheter. Pt in agreement. Called Dr Thu Ayala office, and LM for him or RN to return call to discuss POC. Pt concerned about no TPN for the weekend. Pt discharged and will f/u with patient when return call from office.

## 2019-11-23 PROCEDURE — 3331090001 HH PPS REVENUE CREDIT

## 2019-11-23 PROCEDURE — 3331090002 HH PPS REVENUE DEBIT

## 2019-11-24 PROCEDURE — 3331090002 HH PPS REVENUE DEBIT

## 2019-11-24 PROCEDURE — 3331090001 HH PPS REVENUE CREDIT

## 2019-11-25 ENCOUNTER — HOSPITAL ENCOUNTER (OUTPATIENT)
Dept: INTERVENTIONAL RADIOLOGY/VASCULAR | Age: 44
Discharge: HOME | End: 2019-11-25
Attending: SURGERY
Payer: MEDICARE

## 2019-11-25 VITALS
HEART RATE: 79 BPM | BODY MASS INDEX: 45.95 KG/M2 | DIASTOLIC BLOOD PRESSURE: 54 MMHG | HEIGHT: 64 IN | OXYGEN SATURATION: 98 % | TEMPERATURE: 97.7 F | SYSTOLIC BLOOD PRESSURE: 133 MMHG | RESPIRATION RATE: 20 BRPM | WEIGHT: 269.13 LBS

## 2019-11-25 PROCEDURE — C1751 CATH, INF, PER/CENT/MIDLINE: HCPCS

## 2019-11-25 PROCEDURE — 77001 FLUOROGUIDE FOR VEIN DEVICE: CPT

## 2019-11-25 PROCEDURE — 74011000250 HC RX REV CODE- 250: Performed by: RADIOLOGY

## 2019-11-25 PROCEDURE — 77030002986 HC SUT PROL J&J -A

## 2019-11-25 PROCEDURE — 3331090001 HH PPS REVENUE CREDIT

## 2019-11-25 PROCEDURE — 99153 MOD SED SAME PHYS/QHP EA: CPT

## 2019-11-25 PROCEDURE — 76937 US GUIDE VASCULAR ACCESS: CPT

## 2019-11-25 PROCEDURE — 36558 INSERT TUNNELED CV CATH: CPT

## 2019-11-25 PROCEDURE — 77030031139 HC SUT VCRL2 J&J -A

## 2019-11-25 PROCEDURE — 74011250636 HC RX REV CODE- 250/636: Performed by: RADIOLOGY

## 2019-11-25 PROCEDURE — 3331090002 HH PPS REVENUE DEBIT

## 2019-11-25 PROCEDURE — 74011636320 HC RX REV CODE- 636/320: Performed by: RADIOLOGY

## 2019-11-25 PROCEDURE — 99152 MOD SED SAME PHYS/QHP 5/>YRS: CPT

## 2019-11-25 PROCEDURE — 77030039266 HC ADH SKN EXOFIN S2SG -A

## 2019-11-25 PROCEDURE — C1894 INTRO/SHEATH, NON-LASER: HCPCS

## 2019-11-25 RX ORDER — LIDOCAINE HYDROCHLORIDE 10 MG/ML
20 INJECTION INFILTRATION; PERINEURAL ONCE
Status: COMPLETED | OUTPATIENT
Start: 2019-11-25 | End: 2019-11-25

## 2019-11-25 RX ORDER — SODIUM CHLORIDE 9 MG/ML
100 INJECTION, SOLUTION INTRAVENOUS ONCE
Status: DISCONTINUED | OUTPATIENT
Start: 2019-11-25 | End: 2019-11-26 | Stop reason: HOSPADM

## 2019-11-25 RX ORDER — HEPARIN SODIUM 200 [USP'U]/100ML
500 INJECTION, SOLUTION INTRAVENOUS
Status: DISCONTINUED | OUTPATIENT
Start: 2019-11-25 | End: 2019-11-29 | Stop reason: HOSPADM

## 2019-11-25 RX ORDER — MIDAZOLAM HYDROCHLORIDE 1 MG/ML
1-10 INJECTION, SOLUTION INTRAMUSCULAR; INTRAVENOUS
Status: DISCONTINUED | OUTPATIENT
Start: 2019-11-25 | End: 2019-11-29 | Stop reason: HOSPADM

## 2019-11-25 RX ORDER — FENTANYL CITRATE 50 UG/ML
25-100 INJECTION, SOLUTION INTRAMUSCULAR; INTRAVENOUS
Status: DISCONTINUED | OUTPATIENT
Start: 2019-11-25 | End: 2019-11-29 | Stop reason: HOSPADM

## 2019-11-25 RX ADMIN — CEFAZOLIN 2 G: 1 INJECTION, POWDER, FOR SOLUTION INTRAMUSCULAR; INTRAVENOUS at 16:14

## 2019-11-25 RX ADMIN — MIDAZOLAM 2 MG: 1 INJECTION INTRAMUSCULAR; INTRAVENOUS at 16:15

## 2019-11-25 RX ADMIN — IOPAMIDOL 10 ML: 755 INJECTION, SOLUTION INTRAVENOUS at 16:32

## 2019-11-25 RX ADMIN — LIDOCAINE HYDROCHLORIDE 10 ML: 10 INJECTION, SOLUTION INFILTRATION; PERINEURAL at 16:18

## 2019-11-25 RX ADMIN — FENTANYL CITRATE 50 MCG: 50 INJECTION, SOLUTION INTRAMUSCULAR; INTRAVENOUS at 16:31

## 2019-11-25 RX ADMIN — FENTANYL CITRATE 50 MCG: 50 INJECTION, SOLUTION INTRAMUSCULAR; INTRAVENOUS at 16:15

## 2019-11-25 RX ADMIN — HEPARIN SODIUM 1000 UNITS: 200 INJECTION, SOLUTION INTRAVENOUS at 16:19

## 2019-11-25 NOTE — PROGRESS NOTES
14: 38 Patient arrived. ID and allergies verified verbally with patient. Pt voices understanding of procedure to be performed. Consent obtained. Pt prepped for procedure. 15:40 TRANSFER - OUT REPORT:    Verbal report given to MAURI Lomas(name) on Ernesto Aberdeen  being transferred to angio(unit) for routine progression of care       Report consisted of patients Situation, Background, Assessment and   Recommendations(SBAR). Information from the following report(s) Procedure Summary was reviewed with the receiving nurse. Lines:   PICC Double Lumen 36/20/90 Right;Basilic (Active)       Peripheral IV 11/25/19 Left Wrist (Active)   Site Assessment Clean, dry, & intact 11/25/2019  3:06 PM        Opportunity for questions and clarification was provided. Patient transported with:   Registered Nurse  16:50. TRANSFER - IN REPORT:    Verbal report received from MAURI Lomas(name) on Ernesto Aberdeen  being received from angio(unit) for routine progression of care      Report consisted of patients Situation, Background, Assessment and   Recommendations(SBAR). Information from the following report(s) Procedure Summary was reviewed with the receiving nurse. Opportunity for questions and clarification was provided. Assessment completed upon patients arrival to unit and care assumed. 17:20,DC instructions reviewed with pt and her father,they voice understanding.

## 2019-11-25 NOTE — DISCHARGE INSTRUCTIONS
Patient Education        Tunneled Catheter: What to Expect at [facility] have had a procedure to give you a tunneled catheter. The catheter is a soft, flexible tube that runs under your skin from a vein in your chest or neck to a large vein near your heart. You may have it for weeks, months, or longer. You will now be able to get medicine, blood, nutrients, or other fluids with more comfort. You will not be stuck with a needle every time. You can use the catheter right away. You will be shown how to use it and how to care for it. You will probably have small dressings where the doctor inserted the catheter and where it exits your body. The area may feel sore for a few days. The dressing where the catheter was put in usually is taken off in 24 hours. If you have stitches here, they will be removed in 10 to 14 days. The stitches where the catheter exits your body will be removed in about 6 weeks. Sometimes glue is used instead of stitches. The glue will fall off as you heal.  There may be a small ring, or cuff, beneath the skin on the catheter. This helps hold the catheter in place. This care sheet gives you a general idea about how long it will take for you to recover. But each person recovers at a different pace. Follow the steps below to feel better as quickly as possible. How can you care for yourself at home? Activity    · Talk to your doctor about what activities you can do. You may not be able to do sports or exercises that use the upper body, such as tennis or weight lifting.     · Avoid arm and upper body movements that may pull on the catheter. These movements include heavy weight lifting and vigorous use of your arms.     · You will probably need to take 1 day off from work and will be able to return to normal activities shortly after. This depends on the type of work you do, why you have the catheter, and how you feel.     · Ask your doctor when you can drive again.  Pay special attention when pulling your seat belt across your chest so it doesn't pull out the catheter. It's okay if the seat belt lays over the catheter.     · You may shower 24 to 48 hours after surgery, if your doctor okays it. Cover the area and catheter so they don't get wet. Pat the cut (incision) dry. Don't go swimming. Medicines    · Your doctor will tell you if and when you can restart your medicines. He or she will also give you instructions about taking any new medicines.     · If you take blood thinners, such as warfarin (Coumadin), clopidogrel (Plavix), or aspirin, be sure to talk to your doctor. He or she will tell you if and when to start taking those medicines again. Make sure that you understand exactly what your doctor wants you to do.     · Take pain medicines exactly as directed. ? doctor if you can take an over-the-counter medicine. ? Do not take two or more pain medicines at the same time unless the doctor told you to. Many pain medicines have acetaminophen, which is Tylenol. Too much acetaminophen (Tylenol) can be harmful.     · If you think your pain medicine is making you sick to your stomach:  ? Take your medicine after meals (unless your doctor has told you not to). ? Ask your doctor for a different pain medicine. Incision care    · You will have a bandage over the cut (incision) the doctor made. A bandage helps the incision heal and protects it. Your doctor will tell you how to take care of this.     · If you have strips of tape on the incision, leave the tape on for a week or until it falls off.     · When the incision has healed and you do not need a bandage, clean the area around the catheter with soap and water at least one time a day. Other instructions    · Go to all appointments to flush the line. This keeps it open.  A nurse or other health professional will flush the line.     · Do not wear jewelry, such as necklaces, that can catch on the catheter.     · If the catheter breaks, follow the instructions your doctor gave you. If you have no instructions, clamp or tie off the catheter. Then, see a doctor as soon as possible.     · To help prevent infection, take a shower instead of a bath. Do not go swimming with the catheter.     · Try to keep the area dry. When you shower, cover the area with waterproof material, such as plastic wrap.     · Never touch the open end of the catheter if the cap is off.     · Never use scissors, knives, pins, or other sharp objects near the catheter or other tubing.     · If your catheter has a clamp, keep it clamped when you are not using it.     · Fasten or tape the catheter to your body to prevent pulling or dangling.     · Avoid clothing that rubs or pulls on your catheter.     · Avoid bending or crimping your catheter.     · Always wash your hands before you touch your catheter.     · Wear loose clothing over the catheter for the first 10 to 14 days. When getting dressed, be careful not to pull on the catheter. Follow-up care is a key part of your treatment and safety. Be sure to make and go to all appointments, and call your doctor if you are having problems. It's also a good idea to know your test results and keep a list of the medicines you take. When should you call for help? Call 911 anytime you think you may need emergency care. For example, call if:    · You passed out (lost consciousness).     · You have severe trouble breathing.     · You have sudden chest pain and shortness of breath, or you cough up blood.     · You have a fast or uneven pulse.    Call your doctor now or seek immediate medical care if:    · You have signs of infection, such as:  ? Increased pain, swelling, warmth, or redness. ? Red streaks leading from the area. ? Pus or blood draining from the area.   ? A fever.     · You have swelling in your face, chest, neck, or arm on the side where the catheter is.     · You have signs of a blood clot, such as bulging veins near the catheter.     · Your catheter is leaking, cracked, or clogged.     · You feel resistance when you inject medicine or fluids into your catheter.     · Your catheter is out of place. This may happen after severe coughing or vomiting, or if you pull on the catheter.     · You have chest pain or shortness of breath.    Watch closely for changes in your health, and be sure to contact your doctor if:    · You have any concerns about your catheter. Where can you learn more? Go to http://cornell-terrell.info/. Enter D346 in the search box to learn more about \"Tunneled Catheter: What to Expect at Home. \"  Current as of: June 26, 2019  Content Version: 12.2  © 0647-9666 [employer]. Care instructions adapted under license by [employer] (which disclaims liability or warranty for this information). If you have questions about a medical condition or this instruction, always ask your healthcare professional. [address] any warranty or liability for your use of this information.

## 2019-11-25 NOTE — H&P
Interventional Radiology History and Physical (Outpatient)    2019    Patient: Vivi Stuart 41 y.o. female     Referring Physician:  Renato Faulkner MD    Chief Complaint: need tunneled central line    History of Present Illness: Chronic sequela of crohn's disease    History:  Past Medical History:   Diagnosis Date    Adverse effect of anesthesia     WOKE DURING SURGERY    Arthritis     Blood clot in vein     STOMACH    Crohn's disease ([address] 8/15/2011    DVT (deep venous thrombosis) ([facility] 8/15/2011    LEFT LEG    Edema     GENERALIZED R/T TPN; WAIST DOWN    Incarcerated ventral hernia 8/15/2011    Lupus ([address]     Lyme disease     Psychiatric disorder     ANXIETY    Right flank pain 2011    Seizures ([facility]     LAST AT AGE 17    Stroke [facility]     age 17;  A WEEK POST OP, HAD SEIZURES AND STROKE, WAS IN COMA FOR A MONTH    Thyroid disease     HYPO-NO MEDS     Family History   Problem Relation Age of Onset    Hypertension Father     Stroke Father     Other Father         arthritis    Arthritis-osteo Father     Hypertension Mother     Arthritis-osteo Mother     Anesth Problems Neg Hx      Social History     Socioeconomic History    Marital status: SINGLE     Spouse name: Not on file    Number of children: Not on file    Years of education: Not on file    Highest education level: Not on file   Occupational History    Not on file   Social Needs    Financial resource strain: Not on file    Food insecurity:     Worry: Not on file     Inability: Not on file    Transportation needs:     Medical: Not on file     Non-medical: Not on file   Tobacco Use    Smoking status: Former Smoker     Packs/day: 1.00     Years: 16.00     Pack years: 16.00     Last attempt to quit: 2017     Years since quittin.7    Smokeless tobacco: Former User    Tobacco comment: OFF AND ON   Substance and Sexual Activity    Alcohol use: No    Drug use: No    Sexual activity: Not on file Lifestyle    Physical activity:     Days per week: Not on file     Minutes per session: Not on file    Stress: Not on file   Relationships    Social connections:     Talks on phone: Not on file     Gets together: Not on file     Attends Mu-ism service: Not on file     Active member of club or organization: Not on file     Attends meetings of clubs or organizations: Not on file     Relationship status: Not on file    Intimate partner violence:     Fear of current or ex partner: Not on file     Emotionally abused: Not on file     Physically abused: Not on file     Forced sexual activity: Not on file   Other Topics Concern    Not on file   Social History Narrative    Not on file       Allergies: Allergies   Allergen Reactions    Sulfa (Sulfonamide Antibiotics) Anaphylaxis    Demerol [Meperidine] Rash    Soma [Carisoprodol] Rash and Nausea Only    Toradol [Ketorolac Tromethamine] Nausea and Vomiting       Prior to Admission Medications:  Prior to Admission medications    Medication Sig Start Date End Date Taking? Authorizing Provider   adalimumab (HUMIRA) 80 mg/0.8 mL pnkt injection 80 mg by SubCUTAneous route Once every 2 weeks. 10/8/19   Samm Griffith MD   teduglutide 5 mg kit 0.6265 mL by SubCUTAneous route daily. 9/15/19   Gaston Smyth MD   acetaminophen-caffeine 500-65 mg (EXCEDRINE TENSION HEADACHE) 500-65 mg tab Take 1 Tab by mouth every eight (8) hours as needed for Headache. Provider, Historical   azaTHIOprine (IMURAN) 50 mg tablet Take 150 mg by mouth daily. Provider, Historical   cholecalciferol, vitamin d3, 10,000 unit cap Take 1 Cap by mouth daily. Provider, Historical   omega 3-DHA-EPA-fish oil (FISH OIL) 1,000 mg (120 mg-180 mg) capsule Take 1 Cap by mouth daily. Provider, Historical   mv-min-C-glutamin-lysine-hb124 (IMMUNE SUPPORT) 250-12.5 mg chew Take 1 Tab by mouth daily. Provider, Historical   aspirin (ASPIRIN) 325 mg tablet Take 325 mg by mouth daily.  8/15/19 Barbara Reid MD   nystatin (MYCOSTATIN) powder Apply 1 Units to affected area daily as needed for Other (peristomal skin irritation). Provider, Historical   morphine IR (MS IR) 30 mg tablet Take 30 mg by mouth four (4) times daily. 1/24/19   Provider, Historical   TPN ADULT - CENTRAL 2,000 mL by IntraVENous route daily. Barbara Reid MD   nystatin (MYCOSTATIN) topical cream Apply 1 Units to affected area as needed for Skin Irritation or Itching. 8/30/18   Provider, Historical   morphine IR (MS IR) 15 mg tablet Take 30 mg by mouth five (5) times daily. 8/13/18   Barbara Reid MD   morphine CR (MS CONTIN) 30 mg CR tablet Take 30 mg by mouth every four (4) hours. 8/13/18   Barbara Reid MD   morphine CR (MS CONTIN) 60 mg CR tablet Take 60 mg by mouth every eight (8) hours. 8/13/18   Barbara Reid MD   omeprazole (PRILOSEC) 20 mg capsule Take 20 mg by mouth daily. 8/13/18   Barbara Reid MD   ondansetron (ZOFRAN ODT) 4 mg disintegrating tablet Take 8 mg by mouth every eight (8) hours as needed for Nausea. Provider, Historical   insulin lispro (HUMALOG) 100 unit/mL kwikpen by SubCUTAneous route two (2) times a day. by SubCUTAneous route two (2) times a day. ~ 2 HRS AFTER START OF TPN CHECK BS: (THIS IS ALSO THE  SS SCHEDULE)  SS:  2 UNITS -249  3 UNITS -299  4 UNITS -349  IF GREATER THAN 350 CALL MD      ~1 HR AFTER TPN COMPLETION CHECK BS:  SS:  2 UNITS -199  3 UNITS -249  5 UNITS -299  7 UNITS -349  CALL MD FOR GREATER THAN 350    Provider, Historical   heparin sod,porcine/0.9 % NaCl (HEPARIN FLUSH IV) 3 mL by IntraVENous Push route daily. after TPN    Provider, Historical   sodium chloride (NORMAL SALINE FLUSH) 10 mL by IntraVENous Push route daily. before and after TPN and/or labs    Provider, Historical   dronabinol (MARINOL) 5 mg capsule Take 1 Cap by mouth two (2) times daily as needed (appetite). Max Daily Amount: 10 mg.   Patient taking differently: Take 1 Cap by mouth every six (6) hours as needed for Nausea. 6/12/18   Marylen Loan, MD   ferrous sulfate 325 mg (65 mg iron) tablet Take 1 Tab by mouth Daily (before breakfast). 6/12/18   Marylen Loan, MD   clonazePAM (KLONOPIN) 0.5 mg tablet Take 1 Tab by mouth two (2) times daily as needed. Max Daily Amount: 1 mg. Indications: anxiety 4/27/18   Marylen Loan, MD   morphine IR (MS IR) 15 mg tablet Take 3 Tabs by mouth every four (4) hours as needed for Pain. Max Daily Amount: 270 mg. Patient taking differently: Take 45 mg by mouth every four (4) hours as needed for Pain. Patient states she takes 5 mg a day now 4/27/18   Marylen Loan, MD   morphine CR (MS CONTIN) 100 mg CR tablet Take 1 Tab by mouth every eight (8) hours. Max Daily Amount: 300 mg. 4/27/18   Marylen Loan, MD   albuterol (PROVENTIL HFA, VENTOLIN HFA, PROAIR HFA) 90 mcg/actuation inhaler Take 2 Puffs by inhalation every four (4) hours as needed for Wheezing or Shortness of Breath. Provider, Historical   ascorbic acid, vitamin C, (VITAMIN C) 250 mg tablet Take 250 mg by mouth two (2) times a day. Provider, Historical   promethazine (PHENERGAN) 25 mg tablet Take 25 mg by mouth every six (6) hours as needed for Nausea. Provider, Historical   VITAMIN B-12 5,000 mcg subl Take 5,000 mcg by mouth daily. 10/30/17   Provider, Historical   pantoprazole (PROTONIX) 40 mg tablet Take 1 Tab by mouth Daily (before breakfast). 10/27/17   Roselia Bermudez MD   ondansetron (ZOFRAN ODT) 4 mg disintegrating tablet Take 1 Tab by mouth every eight (8) hours as needed for Nausea. Patient taking differently: Take 8 mg by mouth every eight (8) hours as needed for Nausea. 10/27/17   Roselia Bermudez MD   insulin lispro (HUMALOG) 100 unit/mL injection As instructed on discharge instructions  Patient taking differently: by SubCUTAneous route two (2) times a day.  ~ 2 HRS AFTER START OF TPN CHECK BS: (THIS IS ALSO THE  SS SCHEDULE)  SS:  2 UNITS -249  3 UNITS -299  4 UNITS BS 300-349  IF GREATER THAN 350 CALL MD      ~1 HR AFTER TPN COMPLETION CHECK BS:  SS:  2 UNITS -199  3 UNITS -249  5 UNITS -299  7 UNITS -349  CALL MD FOR GREATER THAN 350 10/27/17   Jeannine Hernandez MD       Physical Exam:    Height 5' 4\" (1.626 m), weight 122.1 kg (269 lb 2 oz), not currently breastfeeding. General: alert, cooperative, no distress, appears stated age  Heart: rrr  Lungs: clear to auscultation bilaterally  Abdomen: soft  Neuro: grossly intact  Extremities: extremities wnl    Plan of Care/Planned Procedure:  Risks, benefits, and alternatives reviewed with patient and she agrees to proceed with the procedure.      Bk Roche MD

## 2019-11-25 NOTE — PROGRESS NOTES
TRANSFER - OUT REPORT:    Verbal report given to Ramu Holcomb on Drenda Ou being transferred to CLPO(unit) for ordered procedure       Report consisted of patient's Situation, Background, Assessment and   Recommendations(SBAR). Information from the following report(s) Procedure Summary was reviewed with the receiving nurse. Opportunity for questions and clarification was provided.       Patient transported with:   Registered Nurse  Tech

## 2019-11-26 ENCOUNTER — HOME CARE VISIT (OUTPATIENT)
Dept: SCHEDULING | Facility: HOME HEALTH | Age: 44
End: 2019-11-26
Payer: MEDICARE

## 2019-11-26 PROCEDURE — 3331090002 HH PPS REVENUE DEBIT

## 2019-11-26 PROCEDURE — 3331090001 HH PPS REVENUE CREDIT

## 2019-11-26 PROCEDURE — G0300 HHS/HOSPICE OF LPN EA 15 MIN: HCPCS

## 2019-11-27 VITALS
TEMPERATURE: 97.4 F | OXYGEN SATURATION: 99 % | HEART RATE: 97 BPM | RESPIRATION RATE: 18 BRPM | SYSTOLIC BLOOD PRESSURE: 120 MMHG | DIASTOLIC BLOOD PRESSURE: 80 MMHG

## 2019-11-27 PROCEDURE — 3331090001 HH PPS REVENUE CREDIT

## 2019-11-27 PROCEDURE — 3331090002 HH PPS REVENUE DEBIT

## 2019-11-28 ENCOUNTER — HOME CARE VISIT (OUTPATIENT)
Dept: HOME HEALTH SERVICES | Facility: HOME HEALTH | Age: 44
End: 2019-11-28
Payer: MEDICARE

## 2019-11-28 PROCEDURE — 3331090001 HH PPS REVENUE CREDIT

## 2019-11-28 PROCEDURE — 3331090002 HH PPS REVENUE DEBIT

## 2019-11-29 PROCEDURE — 3331090001 HH PPS REVENUE CREDIT

## 2019-11-29 PROCEDURE — 3331090002 HH PPS REVENUE DEBIT

## 2019-11-30 PROCEDURE — 3331090001 HH PPS REVENUE CREDIT

## 2019-11-30 PROCEDURE — 3331090002 HH PPS REVENUE DEBIT

## 2019-12-01 PROCEDURE — 3331090001 HH PPS REVENUE CREDIT

## 2019-12-01 PROCEDURE — 3331090002 HH PPS REVENUE DEBIT

## 2019-12-02 ENCOUNTER — HOME CARE VISIT (OUTPATIENT)
Dept: SCHEDULING | Facility: HOME HEALTH | Age: 44
End: 2019-12-02
Payer: MEDICARE

## 2019-12-02 PROCEDURE — 3331090002 HH PPS REVENUE DEBIT

## 2019-12-02 PROCEDURE — G0300 HHS/HOSPICE OF LPN EA 15 MIN: HCPCS

## 2019-12-02 PROCEDURE — 3331090001 HH PPS REVENUE CREDIT

## 2019-12-03 VITALS
DIASTOLIC BLOOD PRESSURE: 80 MMHG | OXYGEN SATURATION: 99 % | SYSTOLIC BLOOD PRESSURE: 128 MMHG | RESPIRATION RATE: 18 BRPM | HEART RATE: 76 BPM | TEMPERATURE: 97.5 F

## 2019-12-03 PROCEDURE — 3331090001 HH PPS REVENUE CREDIT

## 2019-12-03 PROCEDURE — 3331090002 HH PPS REVENUE DEBIT

## 2019-12-04 PROCEDURE — 3331090002 HH PPS REVENUE DEBIT

## 2019-12-04 PROCEDURE — 3331090001 HH PPS REVENUE CREDIT

## 2019-12-05 ENCOUNTER — HOME CARE VISIT (OUTPATIENT)
Dept: SCHEDULING | Facility: HOME HEALTH | Age: 44
End: 2019-12-05
Payer: MEDICARE

## 2019-12-05 VITALS
HEART RATE: 77 BPM | TEMPERATURE: 97.9 F | DIASTOLIC BLOOD PRESSURE: 78 MMHG | OXYGEN SATURATION: 99 % | SYSTOLIC BLOOD PRESSURE: 116 MMHG | RESPIRATION RATE: 18 BRPM

## 2019-12-05 PROCEDURE — G0300 HHS/HOSPICE OF LPN EA 15 MIN: HCPCS

## 2019-12-05 PROCEDURE — 3331090002 HH PPS REVENUE DEBIT

## 2019-12-05 PROCEDURE — 3331090001 HH PPS REVENUE CREDIT

## 2019-12-06 ENCOUNTER — TELEPHONE (OUTPATIENT)
Dept: SURGERY | Age: 44
End: 2019-12-06

## 2019-12-06 PROCEDURE — 3331090001 HH PPS REVENUE CREDIT

## 2019-12-06 PROCEDURE — 3331090002 HH PPS REVENUE DEBIT

## 2019-12-06 NOTE — TELEPHONE ENCOUNTER
I returned patients call and verified patient with 2 patient identifiers. She states she saw the urologist regarding her kidney mass, Dr. Cheikh Flores, and he suggested she go to [facility] for the surgery. She states Dr. Katharine Parkinson had told her some time back that she could consider [facility], [place] or [facility] for fistula repair. She would like for Dr. Katharine Parkinson to give her a couple of names of people he may recommend from both places so she can do some research before making an appt. w.ith them. She said Dr. Cheikh Flores said she could just have the kidney surgery but if she wanted to go to an all inclusive [facility] she cold meet with someone ahead of surgery just in case they were needed. I told her I was not sure I would be able to reach Dr. Katharine Parkinson before [address] [facility] and she said that was fine.

## 2019-12-07 PROCEDURE — 3331090001 HH PPS REVENUE CREDIT

## 2019-12-07 PROCEDURE — 3331090002 HH PPS REVENUE DEBIT

## 2019-12-08 PROCEDURE — 3331090001 HH PPS REVENUE CREDIT

## 2019-12-08 PROCEDURE — 3331090002 HH PPS REVENUE DEBIT

## 2019-12-09 ENCOUNTER — HOME CARE VISIT (OUTPATIENT)
Dept: SCHEDULING | Facility: HOME HEALTH | Age: 44
End: 2019-12-09
Payer: MEDICARE

## 2019-12-09 VITALS
DIASTOLIC BLOOD PRESSURE: 88 MMHG | TEMPERATURE: 97.5 F | SYSTOLIC BLOOD PRESSURE: 132 MMHG | RESPIRATION RATE: 20 BRPM | OXYGEN SATURATION: 99 % | HEART RATE: 108 BPM

## 2019-12-09 PROCEDURE — G0300 HHS/HOSPICE OF LPN EA 15 MIN: HCPCS

## 2019-12-09 PROCEDURE — 3331090001 HH PPS REVENUE CREDIT

## 2019-12-09 PROCEDURE — 3331090002 HH PPS REVENUE DEBIT

## 2019-12-10 PROCEDURE — 3331090002 HH PPS REVENUE DEBIT

## 2019-12-10 PROCEDURE — 3331090001 HH PPS REVENUE CREDIT

## 2019-12-10 NOTE — TELEPHONE ENCOUNTER
I called Ms. Kwong and spoke with her regarding her fistula and abdominal wound. Pt had a CT scan in November 2019 which showed 4.4 x 3.4 cm mass lower pole right kidney is concerning for neoplasm. The patient has seen Dr. Tootie Sheehan at [facility] for the right kidney mass and wanted to know if the abdominal fistula could be addressed at the same time or if she could be referred to someone at [facility] or [place]. I told the patient that I think the more urgent concern to address is the kidney mass which is worrisome for neoplasm and should be addressed separately. She has a frozen abdomen so I recommend a nephrectomy should be performed through a flank incision and not transabdominal. I have operated on her abdomen several times and the most recent operation took 6 hours to complete but given her Crohn's and severe adhesive disease, she re-fistulized. She has a frozen abdomen and three of my other surgery colleagues including Dr. Modesto Lundborg, Nando Coates and Manolo Cruz have tried to perform adhesiolysis and fistula takedown but to no avail. She currently has short gut syndrome and I am helping to manage her TPN. Clinically she is doing well from a stamina and nutrition standpoint. I recommended to the patient that she sees her urologist for nephrectomy first and I can refer to [facility] (Dr. Beny Morales) in [place], [place] if she chooses to have a fistula takedown in the future. I have attempted to contact Dr. Riana Olivas (Urology) at [phone] and left my contact number with his office to call me about her medical concerns.

## 2019-12-11 PROCEDURE — 3331090001 HH PPS REVENUE CREDIT

## 2019-12-11 PROCEDURE — 3331090002 HH PPS REVENUE DEBIT

## 2019-12-12 ENCOUNTER — HOME CARE VISIT (OUTPATIENT)
Dept: SCHEDULING | Facility: HOME HEALTH | Age: 44
End: 2019-12-12
Payer: MEDICARE

## 2019-12-12 PROCEDURE — 3331090001 HH PPS REVENUE CREDIT

## 2019-12-12 PROCEDURE — G0300 HHS/HOSPICE OF LPN EA 15 MIN: HCPCS

## 2019-12-12 PROCEDURE — 3331090002 HH PPS REVENUE DEBIT

## 2019-12-13 PROCEDURE — 3331090002 HH PPS REVENUE DEBIT

## 2019-12-13 PROCEDURE — 3331090001 HH PPS REVENUE CREDIT

## 2019-12-14 VITALS
DIASTOLIC BLOOD PRESSURE: 76 MMHG | TEMPERATURE: 98.2 F | SYSTOLIC BLOOD PRESSURE: 118 MMHG | HEART RATE: 74 BPM | OXYGEN SATURATION: 99 % | RESPIRATION RATE: 18 BRPM

## 2019-12-14 PROCEDURE — 3331090002 HH PPS REVENUE DEBIT

## 2019-12-14 PROCEDURE — 3331090001 HH PPS REVENUE CREDIT

## 2019-12-15 PROCEDURE — 3331090002 HH PPS REVENUE DEBIT

## 2019-12-15 PROCEDURE — 3331090001 HH PPS REVENUE CREDIT

## 2019-12-16 ENCOUNTER — HOME CARE VISIT (OUTPATIENT)
Dept: SCHEDULING | Facility: HOME HEALTH | Age: 44
End: 2019-12-16
Payer: MEDICARE

## 2019-12-16 PROCEDURE — 3331090001 HH PPS REVENUE CREDIT

## 2019-12-16 PROCEDURE — G0300 HHS/HOSPICE OF LPN EA 15 MIN: HCPCS

## 2019-12-16 PROCEDURE — 3331090002 HH PPS REVENUE DEBIT

## 2019-12-17 PROCEDURE — 3331090002 HH PPS REVENUE DEBIT

## 2019-12-17 PROCEDURE — 3331090001 HH PPS REVENUE CREDIT

## 2019-12-18 VITALS
TEMPERATURE: 98 F | HEART RATE: 80 BPM | OXYGEN SATURATION: 99 % | DIASTOLIC BLOOD PRESSURE: 72 MMHG | SYSTOLIC BLOOD PRESSURE: 124 MMHG | RESPIRATION RATE: 18 BRPM

## 2019-12-18 PROCEDURE — 3331090002 HH PPS REVENUE DEBIT

## 2019-12-18 PROCEDURE — 3331090001 HH PPS REVENUE CREDIT

## 2019-12-19 ENCOUNTER — HOME CARE VISIT (OUTPATIENT)
Dept: SCHEDULING | Facility: HOME HEALTH | Age: 44
End: 2019-12-19
Payer: MEDICARE

## 2019-12-19 VITALS
DIASTOLIC BLOOD PRESSURE: 82 MMHG | OXYGEN SATURATION: 98 % | HEART RATE: 114 BPM | SYSTOLIC BLOOD PRESSURE: 121 MMHG | RESPIRATION RATE: 20 BRPM | TEMPERATURE: 96.2 F

## 2019-12-19 PROCEDURE — 3331090002 HH PPS REVENUE DEBIT

## 2019-12-19 PROCEDURE — G0300 HHS/HOSPICE OF LPN EA 15 MIN: HCPCS

## 2019-12-19 PROCEDURE — 3331090001 HH PPS REVENUE CREDIT

## 2019-12-20 PROCEDURE — 3331090001 HH PPS REVENUE CREDIT

## 2019-12-20 PROCEDURE — 3331090002 HH PPS REVENUE DEBIT

## 2019-12-21 PROCEDURE — 3331090001 HH PPS REVENUE CREDIT

## 2019-12-21 PROCEDURE — 3331090002 HH PPS REVENUE DEBIT

## 2019-12-22 PROCEDURE — 3331090002 HH PPS REVENUE DEBIT

## 2019-12-22 PROCEDURE — 3331090001 HH PPS REVENUE CREDIT

## 2019-12-23 ENCOUNTER — HOME CARE VISIT (OUTPATIENT)
Dept: SCHEDULING | Facility: HOME HEALTH | Age: 44
End: 2019-12-23
Payer: MEDICARE

## 2019-12-23 PROCEDURE — 3331090001 HH PPS REVENUE CREDIT

## 2019-12-23 PROCEDURE — 3331090002 HH PPS REVENUE DEBIT

## 2019-12-23 PROCEDURE — G0299 HHS/HOSPICE OF RN EA 15 MIN: HCPCS

## 2019-12-24 VITALS
TEMPERATURE: 96.2 F | OXYGEN SATURATION: 99 % | RESPIRATION RATE: 15 BRPM | HEART RATE: 78 BPM | DIASTOLIC BLOOD PRESSURE: 81 MMHG | SYSTOLIC BLOOD PRESSURE: 125 MMHG

## 2019-12-24 PROCEDURE — 3331090002 HH PPS REVENUE DEBIT

## 2019-12-24 PROCEDURE — 3331090001 HH PPS REVENUE CREDIT

## 2019-12-25 PROCEDURE — 3331090001 HH PPS REVENUE CREDIT

## 2019-12-25 PROCEDURE — 3331090002 HH PPS REVENUE DEBIT

## 2019-12-26 ENCOUNTER — HOME CARE VISIT (OUTPATIENT)
Dept: HOME HEALTH SERVICES | Facility: HOME HEALTH | Age: 44
End: 2019-12-26
Payer: MEDICARE

## 2019-12-26 PROCEDURE — 3331090001 HH PPS REVENUE CREDIT

## 2019-12-26 PROCEDURE — 3331090002 HH PPS REVENUE DEBIT

## 2019-12-27 ENCOUNTER — HOME CARE VISIT (OUTPATIENT)
Dept: HOME HEALTH SERVICES | Facility: HOME HEALTH | Age: 44
End: 2019-12-27
Payer: MEDICARE

## 2019-12-27 PROCEDURE — 3331090001 HH PPS REVENUE CREDIT

## 2019-12-27 PROCEDURE — 3331090002 HH PPS REVENUE DEBIT

## 2019-12-28 PROCEDURE — 3331090001 HH PPS REVENUE CREDIT

## 2019-12-28 PROCEDURE — 3331090002 HH PPS REVENUE DEBIT

## 2019-12-29 PROCEDURE — 3331090001 HH PPS REVENUE CREDIT

## 2019-12-29 PROCEDURE — 3331090002 HH PPS REVENUE DEBIT

## 2019-12-30 ENCOUNTER — HOME CARE VISIT (OUTPATIENT)
Dept: SCHEDULING | Facility: HOME HEALTH | Age: 44
End: 2019-12-30
Payer: MEDICARE

## 2019-12-30 PROCEDURE — G0299 HHS/HOSPICE OF RN EA 15 MIN: HCPCS

## 2019-12-30 PROCEDURE — 3331090001 HH PPS REVENUE CREDIT

## 2019-12-30 PROCEDURE — 3331090002 HH PPS REVENUE DEBIT

## 2019-12-31 VITALS
HEART RATE: 75 BPM | RESPIRATION RATE: 20 BRPM | SYSTOLIC BLOOD PRESSURE: 146 MMHG | OXYGEN SATURATION: 99 % | DIASTOLIC BLOOD PRESSURE: 95 MMHG | TEMPERATURE: 97.4 F

## 2019-12-31 PROCEDURE — 3331090002 HH PPS REVENUE DEBIT

## 2019-12-31 PROCEDURE — 3331090001 HH PPS REVENUE CREDIT

## 2020-01-01 PROCEDURE — 3331090002 HH PPS REVENUE DEBIT

## 2020-01-01 PROCEDURE — 3331090001 HH PPS REVENUE CREDIT

## 2020-01-02 ENCOUNTER — HOME CARE VISIT (OUTPATIENT)
Dept: SCHEDULING | Facility: HOME HEALTH | Age: 45
End: 2020-01-02
Payer: MEDICARE

## 2020-01-02 PROCEDURE — 3331090002 HH PPS REVENUE DEBIT

## 2020-01-02 PROCEDURE — 3331090001 HH PPS REVENUE CREDIT

## 2020-01-03 PROCEDURE — 3331090001 HH PPS REVENUE CREDIT

## 2020-01-03 PROCEDURE — 3331090002 HH PPS REVENUE DEBIT

## 2020-01-04 PROCEDURE — 3331090002 HH PPS REVENUE DEBIT

## 2020-01-04 PROCEDURE — 3331090001 HH PPS REVENUE CREDIT

## 2020-01-05 PROCEDURE — 3331090002 HH PPS REVENUE DEBIT

## 2020-01-05 PROCEDURE — 3331090001 HH PPS REVENUE CREDIT

## 2020-01-06 ENCOUNTER — HOME CARE VISIT (OUTPATIENT)
Dept: SCHEDULING | Facility: HOME HEALTH | Age: 45
End: 2020-01-06
Payer: MEDICARE

## 2020-01-06 PROCEDURE — G0300 HHS/HOSPICE OF LPN EA 15 MIN: HCPCS

## 2020-01-06 PROCEDURE — 3331090001 HH PPS REVENUE CREDIT

## 2020-01-06 PROCEDURE — 3331090002 HH PPS REVENUE DEBIT

## 2020-01-07 ENCOUNTER — TELEPHONE (OUTPATIENT)
Dept: SURGERY | Age: 45
End: 2020-01-07

## 2020-01-07 PROCEDURE — 3331090002 HH PPS REVENUE DEBIT

## 2020-01-07 PROCEDURE — 3331090001 HH PPS REVENUE CREDIT

## 2020-01-07 NOTE — TELEPHONE ENCOUNTER
I returned patients call and told her to the best of my knowledge, Dr. Tyshawn Davenport had signed all supply request orders on her. I also told her it may be helpful if they put '2nd request' on orders they are sending again as I can go and  find #1 request so Dr. Tyshawn Davenport is not signing multiple times.

## 2020-01-07 NOTE — TELEPHONE ENCOUNTER
Pt called wanting [facility] to make sure of signatures for her medical supplies. She said because of the new year, all the signatures for the orders need to be done ASAP in order not to delay shipments of her medical supplies. After they are signed, she said they shouldn't be a problem only the new year may affect the shipments.      Call patient back at [phone]

## 2020-01-08 PROCEDURE — 3331090002 HH PPS REVENUE DEBIT

## 2020-01-08 PROCEDURE — 3331090001 HH PPS REVENUE CREDIT

## 2020-01-09 ENCOUNTER — HOME CARE VISIT (OUTPATIENT)
Dept: SCHEDULING | Facility: HOME HEALTH | Age: 45
End: 2020-01-09
Payer: MEDICARE

## 2020-01-09 PROCEDURE — G0299 HHS/HOSPICE OF RN EA 15 MIN: HCPCS

## 2020-01-09 PROCEDURE — 3331090001 HH PPS REVENUE CREDIT

## 2020-01-09 PROCEDURE — 3331090002 HH PPS REVENUE DEBIT

## 2020-01-10 ENCOUNTER — HOME CARE VISIT (OUTPATIENT)
Dept: HOME HEALTH SERVICES | Facility: HOME HEALTH | Age: 45
End: 2020-01-10
Payer: MEDICARE

## 2020-01-10 PROCEDURE — 3331090001 HH PPS REVENUE CREDIT

## 2020-01-10 PROCEDURE — 3331090002 HH PPS REVENUE DEBIT

## 2020-01-11 VITALS
RESPIRATION RATE: 18 BRPM | HEART RATE: 81 BPM | TEMPERATURE: 97.7 F | DIASTOLIC BLOOD PRESSURE: 62 MMHG | OXYGEN SATURATION: 99 % | SYSTOLIC BLOOD PRESSURE: 124 MMHG

## 2020-01-11 PROCEDURE — 3331090002 HH PPS REVENUE DEBIT

## 2020-01-11 PROCEDURE — 3331090001 HH PPS REVENUE CREDIT

## 2020-01-12 VITALS
TEMPERATURE: 96.5 F | DIASTOLIC BLOOD PRESSURE: 92 MMHG | OXYGEN SATURATION: 97 % | SYSTOLIC BLOOD PRESSURE: 124 MMHG | RESPIRATION RATE: 15 BRPM | HEART RATE: 60 BPM

## 2020-01-12 PROCEDURE — 3331090001 HH PPS REVENUE CREDIT

## 2020-01-12 PROCEDURE — 3331090002 HH PPS REVENUE DEBIT

## 2020-01-13 ENCOUNTER — HOME CARE VISIT (OUTPATIENT)
Dept: SCHEDULING | Facility: HOME HEALTH | Age: 45
End: 2020-01-13
Payer: MEDICARE

## 2020-01-13 PROCEDURE — 3331090001 HH PPS REVENUE CREDIT

## 2020-01-13 PROCEDURE — G0300 HHS/HOSPICE OF LPN EA 15 MIN: HCPCS

## 2020-01-13 PROCEDURE — 3331090002 HH PPS REVENUE DEBIT

## 2020-01-13 PROCEDURE — 3331090003 HH PPS REVENUE ADJ

## 2020-01-14 VITALS
OXYGEN SATURATION: 99 % | RESPIRATION RATE: 18 BRPM | SYSTOLIC BLOOD PRESSURE: 98 MMHG | TEMPERATURE: 97.7 F | HEART RATE: 78 BPM | DIASTOLIC BLOOD PRESSURE: 64 MMHG

## 2020-01-14 PROCEDURE — 3331090002 HH PPS REVENUE DEBIT

## 2020-01-14 PROCEDURE — 400014 HH F/U

## 2020-01-14 PROCEDURE — 3331090001 HH PPS REVENUE CREDIT

## 2020-01-15 ENCOUNTER — HOME CARE VISIT (OUTPATIENT)
Dept: HOME HEALTH SERVICES | Facility: HOME HEALTH | Age: 45
End: 2020-01-15
Payer: MEDICARE

## 2020-01-15 PROCEDURE — 3331090002 HH PPS REVENUE DEBIT

## 2020-01-15 PROCEDURE — 3331090001 HH PPS REVENUE CREDIT

## 2020-01-15 PROCEDURE — 3331090003 HH PPS REVENUE ADJ

## 2020-01-16 PROCEDURE — 3331090002 HH PPS REVENUE DEBIT

## 2020-01-16 PROCEDURE — 3331090001 HH PPS REVENUE CREDIT

## 2020-01-17 PROCEDURE — 3331090002 HH PPS REVENUE DEBIT

## 2020-01-17 PROCEDURE — 3331090001 HH PPS REVENUE CREDIT

## 2020-01-17 NOTE — PROGRESS NOTES
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 Pt is getting a bit strong. Fistula output is management. She is eating more but still requires TPN. No fever or chills.    Visit Vitals  /82 (BP 1 Location: Left arm, BP Patient Position: Sitting)   Pulse 98   Temp 98 °F (36.7 °C) (Oral)   Resp 16   SpO2 97%       Physical Exam:    Gen:  NAD  Pulm:  Unlabored  Abd:  S/ND/appropriate TTP  Midline wound with large rosa bud fistula.     AP:  37 yo woman with Crohn's disease and EC fistula     - EC fistula:  Continue wound care with abdominal wound pouching system  - Short gut syndrome:    Continue TPN for now. Trial of Gattex which is a daily injection that helps promote growth of intestinal epithelial cells and increase intestinal absorption.

## 2020-01-18 PROCEDURE — 3331090001 HH PPS REVENUE CREDIT

## 2020-01-18 PROCEDURE — 3331090002 HH PPS REVENUE DEBIT

## 2020-01-19 PROCEDURE — 3331090001 HH PPS REVENUE CREDIT

## 2020-01-19 PROCEDURE — 3331090002 HH PPS REVENUE DEBIT

## 2020-01-20 PROCEDURE — 3331090002 HH PPS REVENUE DEBIT

## 2020-01-20 PROCEDURE — 3331090001 HH PPS REVENUE CREDIT

## 2020-01-21 ENCOUNTER — HOME CARE VISIT (OUTPATIENT)
Dept: HOME HEALTH SERVICES | Facility: HOME HEALTH | Age: 45
End: 2020-01-21
Payer: MEDICARE

## 2020-01-24 ENCOUNTER — HOME HEALTH ADMISSION (OUTPATIENT)
Dept: HOME HEALTH SERVICES | Facility: HOME HEALTH | Age: 45
End: 2020-01-24

## 2020-02-19 ENCOUNTER — HOME HEALTH ADMISSION (OUTPATIENT)
Dept: HOME HEALTH SERVICES | Facility: HOME HEALTH | Age: 45
End: 2020-02-19
Payer: MEDICARE

## 2020-02-21 ENCOUNTER — HOME CARE VISIT (OUTPATIENT)
Dept: SCHEDULING | Facility: HOME HEALTH | Age: 45
End: 2020-02-21
Payer: MEDICARE

## 2020-02-21 PROCEDURE — G0299 HHS/HOSPICE OF RN EA 15 MIN: HCPCS

## 2020-02-21 PROCEDURE — 400013 HH SOC

## 2020-02-23 VITALS
OXYGEN SATURATION: 98 % | HEART RATE: 70 BPM | RESPIRATION RATE: 17 BRPM | DIASTOLIC BLOOD PRESSURE: 87 MMHG | TEMPERATURE: 97.6 F | SYSTOLIC BLOOD PRESSURE: 133 MMHG

## 2020-02-25 ENCOUNTER — HOME CARE VISIT (OUTPATIENT)
Dept: SCHEDULING | Facility: HOME HEALTH | Age: 45
End: 2020-02-25
Payer: MEDICARE

## 2020-02-25 VITALS
TEMPERATURE: 96.4 F | SYSTOLIC BLOOD PRESSURE: 116 MMHG | HEART RATE: 63 BPM | RESPIRATION RATE: 18 BRPM | DIASTOLIC BLOOD PRESSURE: 80 MMHG | OXYGEN SATURATION: 99 %

## 2020-02-25 PROCEDURE — G0152 HHCP-SERV OF OT,EA 15 MIN: HCPCS

## 2020-02-25 NOTE — PROGRESS NOTES
NUTRITION       Recommendations:  1. Tube feeding progression per surgery (?increase 10 mL/hr q 12 vs 24 hours)  -- Of note, goal tube feeding regimen to meet 100% of estimated needs would be: Peptamen 1.5 @ 60 mL/hr + 1 pkt liquid Prosource BID + 100 mL flush q 3 hours    -- Will need to monitor for s/s malabsorption in order to confidently discontinue TPN    2. Diet order per MD (will need a formal diet order in order for the kitchen to send foods other than clear liquids). Recommended to pt that she only choose 2 or 3 items per tray (including drinks)  -- Monitor post-prandial symptoms closely while we are also starting to feed enterally in order to adequately evaluate tube feeding tolerance    3. Need to continue daily weights and document standing weights as often as possible (last standing weight is from 3/29)    4. TPN adjustments per MD.  Would continue goal for now to optimize healing. Noted tube feedings have been ordered. Chart reviewed, discussed with RN. Pt started on tube feedings yesterday of Peptamen 1.5 @ 10 mL/hr + 30 mL flush q 4hours. Rate increased to 20 mL/hr today. Output from ileostomy has been negligible so far today (<30 mL clear fluid currently in the bag). Pt and RN told that she could eat soft foods. RN to clarify diet order. Goal tube feeding to provide 2340 kcal, 128 g protein and 1910 mL total free water (tf + flush). TPN continues at goal and providing a daily average of 2422 kcal, 144 g protein in 2400 mL (2650 mL on lipid days).     Last 3 Recorded Weights in this Encounter    04/03/18 0457 04/04/18 0458 04/06/18 0707   Weight: (!) 167.6 kg (369 lb 7.9 oz) 157.4 kg (347 lb 0.1 oz) (!) 168.1 kg (370 lb 9.5 oz)     Estimated Nutrition Needs:   Kcals/day: 8607 Kcals/day (9369-6498 kcal/day (11-15 kcal/kg))  Protein: 120 g (120-150 kcal/day (0.8-1 g/kg))  Fluid: 2250 ml (or 1 mL/kcal or per output)     Based On: Kcal/kg - specify (Comment)  Weight Used: Actual wt (149.9 kg)    RD to follow.     [address] Alert and oriented, no focal deficits, no motor or sensory deficits.

## 2020-02-26 ENCOUNTER — HOME CARE VISIT (OUTPATIENT)
Dept: SCHEDULING | Facility: HOME HEALTH | Age: 45
End: 2020-02-26
Payer: MEDICARE

## 2020-02-26 VITALS
DIASTOLIC BLOOD PRESSURE: 81 MMHG | TEMPERATURE: 97.1 F | RESPIRATION RATE: 18 BRPM | SYSTOLIC BLOOD PRESSURE: 124 MMHG | OXYGEN SATURATION: 99 % | HEART RATE: 76 BPM

## 2020-02-26 VITALS
RESPIRATION RATE: 18 BRPM | TEMPERATURE: 97.1 F | DIASTOLIC BLOOD PRESSURE: 81 MMHG | HEART RATE: 76 BPM | OXYGEN SATURATION: 99 % | SYSTOLIC BLOOD PRESSURE: 124 MMHG

## 2020-02-26 PROCEDURE — G0300 HHS/HOSPICE OF LPN EA 15 MIN: HCPCS

## 2020-02-26 PROCEDURE — G0151 HHCP-SERV OF PT,EA 15 MIN: HCPCS

## 2020-02-28 NOTE — TELEPHONE ENCOUNTER
I returned call from Zari Huynh with [employer] [phone] and she is requesting last office note (2/5/19) be faxed to [phone] for continued supplies based on  medical necessity. I told her I would route this message to Dr. Evelyne Garcia.
I returned patients call and she states in order for her to get her supplies Antonia Cabrera with [employer] said she needed 1)medical records 2) chart notes and 3)letter of medical necessity. She asked if I could send last office note (2/5/19) to them to at least get some supplies and I told her I would talk with Dr. Serafin Grey to make sure the note is in the chart. I suggested she make an appt. With him as she was overdue and then we would have updated info to send as well. Call was transferred up front to make appt.
Patient is calling stating [employer] didn't receive script for ostomy bags, and some other stuff, she also stated they need office notes and such faxed to them. Please give her a call to clarify.
No Exposure

## 2020-03-02 ENCOUNTER — HOME CARE VISIT (OUTPATIENT)
Dept: SCHEDULING | Facility: HOME HEALTH | Age: 45
End: 2020-03-02
Payer: MEDICARE

## 2020-03-02 VITALS
OXYGEN SATURATION: 99 % | TEMPERATURE: 96.3 F | DIASTOLIC BLOOD PRESSURE: 80 MMHG | SYSTOLIC BLOOD PRESSURE: 121 MMHG | HEART RATE: 81 BPM

## 2020-03-02 PROCEDURE — G0157 HHC PT ASSISTANT EA 15: HCPCS

## 2020-03-03 ENCOUNTER — HOME CARE VISIT (OUTPATIENT)
Dept: SCHEDULING | Facility: HOME HEALTH | Age: 45
End: 2020-03-03
Payer: MEDICARE

## 2020-03-03 VITALS
OXYGEN SATURATION: 99 % | DIASTOLIC BLOOD PRESSURE: 82 MMHG | HEART RATE: 76 BPM | TEMPERATURE: 97.5 F | SYSTOLIC BLOOD PRESSURE: 123 MMHG | RESPIRATION RATE: 18 BRPM

## 2020-03-03 PROCEDURE — G0300 HHS/HOSPICE OF LPN EA 15 MIN: HCPCS

## 2020-03-04 ENCOUNTER — HOME CARE VISIT (OUTPATIENT)
Dept: SCHEDULING | Facility: HOME HEALTH | Age: 45
End: 2020-03-04
Payer: MEDICARE

## 2020-03-04 VITALS
DIASTOLIC BLOOD PRESSURE: 82 MMHG | TEMPERATURE: 97 F | SYSTOLIC BLOOD PRESSURE: 132 MMHG | OXYGEN SATURATION: 99 % | HEART RATE: 95 BPM

## 2020-03-04 PROCEDURE — G0157 HHC PT ASSISTANT EA 15: HCPCS

## 2020-03-10 ENCOUNTER — HOME CARE VISIT (OUTPATIENT)
Dept: SCHEDULING | Facility: HOME HEALTH | Age: 45
End: 2020-03-10
Payer: MEDICARE

## 2020-03-10 VITALS
SYSTOLIC BLOOD PRESSURE: 119 MMHG | DIASTOLIC BLOOD PRESSURE: 80 MMHG | TEMPERATURE: 97.2 F | OXYGEN SATURATION: 99 % | RESPIRATION RATE: 18 BRPM | HEART RATE: 95 BPM

## 2020-03-10 VITALS
TEMPERATURE: 97.2 F | HEART RATE: 95 BPM | SYSTOLIC BLOOD PRESSURE: 104 MMHG | DIASTOLIC BLOOD PRESSURE: 70 MMHG | OXYGEN SATURATION: 99 %

## 2020-03-10 PROCEDURE — G0300 HHS/HOSPICE OF LPN EA 15 MIN: HCPCS

## 2020-03-10 PROCEDURE — G0157 HHC PT ASSISTANT EA 15: HCPCS

## 2020-03-11 ENCOUNTER — HOME CARE VISIT (OUTPATIENT)
Dept: HOME HEALTH SERVICES | Facility: HOME HEALTH | Age: 45
End: 2020-03-11
Payer: MEDICARE

## 2020-03-11 LAB
ALBUMIN SERPL-MCNC: 3.9 G/DL (ref 3.8–4.8)
ALBUMIN/GLOB SERPL: 2.1 {RATIO} (ref 1.2–2.2)
ALP SERPL-CCNC: 89 IU/L (ref 39–117)
ALT SERPL-CCNC: 31 IU/L (ref 0–32)
AST SERPL-CCNC: 15 IU/L (ref 0–40)
BASOPHILS # BLD AUTO: 0.1 X10E3/UL (ref 0–0.2)
BASOPHILS NFR BLD AUTO: 1 %
BILIRUB SERPL-MCNC: 0.8 MG/DL (ref 0–1.2)
BUN SERPL-MCNC: 25 MG/DL (ref 6–24)
BUN/CREAT SERPL: 37 (ref 9–23)
CALCIUM SERPL-MCNC: 9.1 MG/DL (ref 8.7–10.2)
CHLORIDE SERPL-SCNC: 99 MMOL/L (ref 96–106)
CO2 SERPL-SCNC: 20 MMOL/L (ref 20–29)
CREAT SERPL-MCNC: 0.67 MG/DL (ref 0.57–1)
EOSINOPHIL # BLD AUTO: 0 X10E3/UL (ref 0–0.4)
EOSINOPHIL NFR BLD AUTO: 0 %
ERYTHROCYTE [DISTWIDTH] IN BLOOD BY AUTOMATED COUNT: 14.4 % (ref 11.7–15.4)
GLOBULIN SER CALC-MCNC: 1.9 G/DL (ref 1.5–4.5)
GLUCOSE SERPL-MCNC: 200 MG/DL (ref 65–99)
HBA1C MFR BLD: 5.2 % (ref 4.8–5.6)
HCT VFR BLD AUTO: 37.4 % (ref 34–46.6)
HGB BLD-MCNC: 12.3 G/DL (ref 11.1–15.9)
IMM GRANULOCYTES # BLD AUTO: 0.4 X10E3/UL (ref 0–0.1)
IMM GRANULOCYTES NFR BLD AUTO: 4 %
LYMPHOCYTES # BLD AUTO: 1.9 X10E3/UL (ref 0.7–3.1)
LYMPHOCYTES NFR BLD AUTO: 19 %
MAGNESIUM SERPL-MCNC: 1.8 MG/DL (ref 1.6–2.3)
MCH RBC QN AUTO: 35.3 PG (ref 26.6–33)
MCHC RBC AUTO-ENTMCNC: 32.9 G/DL (ref 31.5–35.7)
MCV RBC AUTO: 108 FL (ref 79–97)
MONOCYTES # BLD AUTO: 0.7 X10E3/UL (ref 0.1–0.9)
MONOCYTES NFR BLD AUTO: 7 %
NEUTROPHILS # BLD AUTO: 7 X10E3/UL (ref 1.4–7)
NEUTROPHILS NFR BLD AUTO: 69 %
PHOSPHATE SERPL-MCNC: 3.4 MG/DL (ref 3–4.3)
PLATELET # BLD AUTO: 158 X10E3/UL (ref 150–450)
POTASSIUM SERPL-SCNC: 3.7 MMOL/L (ref 3.5–5.2)
PREALB SERPL-MCNC: 45 MG/DL (ref 12–34)
PROT SERPL-MCNC: 5.8 G/DL (ref 6–8.5)
RBC # BLD AUTO: 3.48 X10E6/UL (ref 3.77–5.28)
SODIUM SERPL-SCNC: 140 MMOL/L (ref 134–144)
TRIGL SERPL-MCNC: 195 MG/DL (ref 0–149)
WBC # BLD AUTO: 10.1 X10E3/UL (ref 3.4–10.8)

## 2020-03-13 ENCOUNTER — HOME CARE VISIT (OUTPATIENT)
Dept: HOME HEALTH SERVICES | Facility: HOME HEALTH | Age: 45
End: 2020-03-13
Payer: MEDICARE

## 2020-03-13 ENCOUNTER — HOME CARE VISIT (OUTPATIENT)
Dept: SCHEDULING | Facility: HOME HEALTH | Age: 45
End: 2020-03-13
Payer: MEDICARE

## 2020-03-13 VITALS
HEART RATE: 67 BPM | OXYGEN SATURATION: 96 % | TEMPERATURE: 98.1 F | DIASTOLIC BLOOD PRESSURE: 84 MMHG | SYSTOLIC BLOOD PRESSURE: 138 MMHG

## 2020-03-13 PROCEDURE — G0157 HHC PT ASSISTANT EA 15: HCPCS

## 2020-03-16 ENCOUNTER — HOME CARE VISIT (OUTPATIENT)
Dept: SCHEDULING | Facility: HOME HEALTH | Age: 45
End: 2020-03-16
Payer: MEDICARE

## 2020-03-16 ENCOUNTER — HOME CARE VISIT (OUTPATIENT)
Dept: HOME HEALTH SERVICES | Facility: HOME HEALTH | Age: 45
End: 2020-03-16
Payer: MEDICARE

## 2020-03-16 VITALS
TEMPERATURE: 96.9 F | OXYGEN SATURATION: 99 % | SYSTOLIC BLOOD PRESSURE: 123 MMHG | DIASTOLIC BLOOD PRESSURE: 79 MMHG | HEART RATE: 60 BPM

## 2020-03-16 PROCEDURE — G0157 HHC PT ASSISTANT EA 15: HCPCS

## 2020-03-17 ENCOUNTER — HOME CARE VISIT (OUTPATIENT)
Dept: HOME HEALTH SERVICES | Facility: HOME HEALTH | Age: 45
End: 2020-03-17
Payer: MEDICARE

## 2020-03-17 ENCOUNTER — HOME CARE VISIT (OUTPATIENT)
Dept: SCHEDULING | Facility: HOME HEALTH | Age: 45
End: 2020-03-17
Payer: MEDICARE

## 2020-03-17 PROCEDURE — G0300 HHS/HOSPICE OF LPN EA 15 MIN: HCPCS

## 2020-03-18 ENCOUNTER — HOME CARE VISIT (OUTPATIENT)
Dept: SCHEDULING | Facility: HOME HEALTH | Age: 45
End: 2020-03-18
Payer: MEDICARE

## 2020-03-18 VITALS
TEMPERATURE: 96.4 F | SYSTOLIC BLOOD PRESSURE: 113 MMHG | HEART RATE: 98 BPM | OXYGEN SATURATION: 99 % | DIASTOLIC BLOOD PRESSURE: 78 MMHG

## 2020-03-18 PROCEDURE — G0157 HHC PT ASSISTANT EA 15: HCPCS

## 2020-03-19 VITALS
HEART RATE: 80 BPM | RESPIRATION RATE: 18 BRPM | DIASTOLIC BLOOD PRESSURE: 66 MMHG | OXYGEN SATURATION: 99 % | SYSTOLIC BLOOD PRESSURE: 124 MMHG | TEMPERATURE: 98.2 F

## 2020-03-22 ENCOUNTER — HOME CARE VISIT (OUTPATIENT)
Dept: HOME HEALTH SERVICES | Facility: HOME HEALTH | Age: 45
End: 2020-03-22
Payer: MEDICARE

## 2020-03-23 ENCOUNTER — HOME CARE VISIT (OUTPATIENT)
Dept: HOME HEALTH SERVICES | Facility: HOME HEALTH | Age: 45
End: 2020-03-23
Payer: MEDICARE

## 2020-03-23 ENCOUNTER — HOME CARE VISIT (OUTPATIENT)
Dept: SCHEDULING | Facility: HOME HEALTH | Age: 45
End: 2020-03-23
Payer: MEDICARE

## 2020-03-23 VITALS
SYSTOLIC BLOOD PRESSURE: 120 MMHG | DIASTOLIC BLOOD PRESSURE: 78 MMHG | HEART RATE: 64 BPM | TEMPERATURE: 97 F | OXYGEN SATURATION: 99 %

## 2020-03-23 PROCEDURE — G0157 HHC PT ASSISTANT EA 15: HCPCS

## 2020-03-23 PROCEDURE — G0300 HHS/HOSPICE OF LPN EA 15 MIN: HCPCS

## 2020-03-23 PROCEDURE — 400013 HH SOC

## 2020-03-24 VITALS
DIASTOLIC BLOOD PRESSURE: 78 MMHG | OXYGEN SATURATION: 99 % | HEART RATE: 64 BPM | TEMPERATURE: 97 F | SYSTOLIC BLOOD PRESSURE: 120 MMHG | RESPIRATION RATE: 18 BRPM

## 2020-03-27 ENCOUNTER — HOME CARE VISIT (OUTPATIENT)
Dept: SCHEDULING | Facility: HOME HEALTH | Age: 45
End: 2020-03-27
Payer: MEDICARE

## 2020-03-27 PROCEDURE — G0151 HHCP-SERV OF PT,EA 15 MIN: HCPCS

## 2020-03-28 VITALS
SYSTOLIC BLOOD PRESSURE: 133 MMHG | OXYGEN SATURATION: 99 % | TEMPERATURE: 96.2 F | HEART RATE: 61 BPM | DIASTOLIC BLOOD PRESSURE: 87 MMHG

## 2020-04-01 ENCOUNTER — HOME CARE VISIT (OUTPATIENT)
Dept: SCHEDULING | Facility: HOME HEALTH | Age: 45
End: 2020-04-01
Payer: MEDICARE

## 2020-04-01 ENCOUNTER — HOME CARE VISIT (OUTPATIENT)
Dept: HOME HEALTH SERVICES | Facility: HOME HEALTH | Age: 45
End: 2020-04-01
Payer: MEDICARE

## 2020-04-01 PROCEDURE — G0300 HHS/HOSPICE OF LPN EA 15 MIN: HCPCS

## 2020-04-02 ENCOUNTER — HOME CARE VISIT (OUTPATIENT)
Dept: SCHEDULING | Facility: HOME HEALTH | Age: 45
End: 2020-04-02
Payer: MEDICARE

## 2020-04-02 ENCOUNTER — HOME CARE VISIT (OUTPATIENT)
Dept: HOME HEALTH SERVICES | Facility: HOME HEALTH | Age: 45
End: 2020-04-02
Payer: MEDICARE

## 2020-04-02 VITALS
OXYGEN SATURATION: 99 % | SYSTOLIC BLOOD PRESSURE: 126 MMHG | TEMPERATURE: 96.5 F | DIASTOLIC BLOOD PRESSURE: 85 MMHG | HEART RATE: 98 BPM

## 2020-04-02 PROCEDURE — G0157 HHC PT ASSISTANT EA 15: HCPCS

## 2020-04-03 ENCOUNTER — HOME CARE VISIT (OUTPATIENT)
Dept: SCHEDULING | Facility: HOME HEALTH | Age: 45
End: 2020-04-03
Payer: MEDICARE

## 2020-04-03 VITALS
HEART RATE: 70 BPM | SYSTOLIC BLOOD PRESSURE: 122 MMHG | DIASTOLIC BLOOD PRESSURE: 80 MMHG | OXYGEN SATURATION: 98 % | TEMPERATURE: 97.2 F

## 2020-04-03 PROCEDURE — G0157 HHC PT ASSISTANT EA 15: HCPCS

## 2020-04-06 ENCOUNTER — HOME CARE VISIT (OUTPATIENT)
Dept: SCHEDULING | Facility: HOME HEALTH | Age: 45
End: 2020-04-06
Payer: MEDICARE

## 2020-04-06 ENCOUNTER — HOME CARE VISIT (OUTPATIENT)
Dept: HOME HEALTH SERVICES | Facility: HOME HEALTH | Age: 45
End: 2020-04-06
Payer: MEDICARE

## 2020-04-06 VITALS
SYSTOLIC BLOOD PRESSURE: 138 MMHG | OXYGEN SATURATION: 98 % | RESPIRATION RATE: 18 BRPM | TEMPERATURE: 97.2 F | DIASTOLIC BLOOD PRESSURE: 90 MMHG | HEART RATE: 96 BPM

## 2020-04-06 VITALS
HEART RATE: 94 BPM | SYSTOLIC BLOOD PRESSURE: 114 MMHG | RESPIRATION RATE: 17 BRPM | OXYGEN SATURATION: 95 % | DIASTOLIC BLOOD PRESSURE: 76 MMHG | TEMPERATURE: 97 F

## 2020-04-06 VITALS
SYSTOLIC BLOOD PRESSURE: 146 MMHG | TEMPERATURE: 97.2 F | HEART RATE: 64 BPM | DIASTOLIC BLOOD PRESSURE: 91 MMHG | RESPIRATION RATE: 18 BRPM | OXYGEN SATURATION: 99 %

## 2020-04-06 PROCEDURE — G0300 HHS/HOSPICE OF LPN EA 15 MIN: HCPCS

## 2020-04-06 PROCEDURE — G0157 HHC PT ASSISTANT EA 15: HCPCS

## 2020-04-08 ENCOUNTER — HOME CARE VISIT (OUTPATIENT)
Dept: HOME HEALTH SERVICES | Facility: HOME HEALTH | Age: 45
End: 2020-04-08
Payer: MEDICARE

## 2020-04-09 ENCOUNTER — HOME CARE VISIT (OUTPATIENT)
Dept: HOME HEALTH SERVICES | Facility: HOME HEALTH | Age: 45
End: 2020-04-09
Payer: MEDICARE

## 2020-04-10 ENCOUNTER — HOME CARE VISIT (OUTPATIENT)
Dept: SCHEDULING | Facility: HOME HEALTH | Age: 45
End: 2020-04-10
Payer: MEDICARE

## 2020-04-10 VITALS
TEMPERATURE: 97 F | SYSTOLIC BLOOD PRESSURE: 105 MMHG | OXYGEN SATURATION: 98 % | HEART RATE: 67 BPM | DIASTOLIC BLOOD PRESSURE: 61 MMHG

## 2020-04-10 PROCEDURE — G0151 HHCP-SERV OF PT,EA 15 MIN: HCPCS

## 2020-04-10 NOTE — PROGRESS NOTES
PT called this pt x3 yesterday 04-08 and left messages to schedule reass/dc visit-did not hear back. Called pt x2 again today [phone]  and left messages. Texted patient on the same number. Also tried calling the other number listed [phone] ( ? moms no)- unable to reach. Will call pt one more time tomorrow and if we cannot get a hold of patient will complete discipline dc.     Thanks  Jess Handing DPT/GCS

## 2020-04-13 ENCOUNTER — HOME CARE VISIT (OUTPATIENT)
Dept: SCHEDULING | Facility: HOME HEALTH | Age: 45
End: 2020-04-13
Payer: MEDICARE

## 2020-04-13 ENCOUNTER — HOME CARE VISIT (OUTPATIENT)
Dept: HOME HEALTH SERVICES | Facility: HOME HEALTH | Age: 45
End: 2020-04-13
Payer: MEDICARE

## 2020-04-13 VITALS
RESPIRATION RATE: 18 BRPM | HEART RATE: 90 BPM | SYSTOLIC BLOOD PRESSURE: 117 MMHG | DIASTOLIC BLOOD PRESSURE: 76 MMHG | OXYGEN SATURATION: 99 % | TEMPERATURE: 97.1 F

## 2020-04-13 PROCEDURE — G0300 HHS/HOSPICE OF LPN EA 15 MIN: HCPCS

## 2020-04-14 ENCOUNTER — HOME CARE VISIT (OUTPATIENT)
Dept: HOME HEALTH SERVICES | Facility: HOME HEALTH | Age: 45
End: 2020-04-14
Payer: MEDICARE

## 2020-04-17 ENCOUNTER — HOME CARE VISIT (OUTPATIENT)
Dept: SCHEDULING | Facility: HOME HEALTH | Age: 45
End: 2020-04-17
Payer: MEDICARE

## 2020-04-17 PROCEDURE — G0299 HHS/HOSPICE OF RN EA 15 MIN: HCPCS

## 2020-04-18 LAB
ADALIMUMAB DRUG LEVEL, 503866: 5.4 UG/ML
ALBUMIN SERPL-MCNC: 3.3 G/DL (ref 3.8–4.8)
ALBUMIN/GLOB SERPL: 1.4 {RATIO} (ref 1.2–2.2)
ALP SERPL-CCNC: 85 IU/L (ref 39–117)
ALT SERPL-CCNC: 24 IU/L (ref 0–32)
ANTI-ADALIMUMAB ANTIBODY, 503867: <25 NG/ML
AST SERPL-CCNC: 17 IU/L (ref 0–40)
BASOPHILS # BLD AUTO: 0.1 X10E3/UL (ref 0–0.2)
BASOPHILS NFR BLD AUTO: 1 %
BILIRUB SERPL-MCNC: 0.8 MG/DL (ref 0–1.2)
BUN SERPL-MCNC: 22 MG/DL (ref 6–24)
BUN/CREAT SERPL: 32 (ref 9–23)
CALCIUM SERPL-MCNC: 9 MG/DL (ref 8.7–10.2)
CHLORIDE SERPL-SCNC: 93 MMOL/L (ref 96–106)
CO2 SERPL-SCNC: 26 MMOL/L (ref 20–29)
CREAT SERPL-MCNC: 0.69 MG/DL (ref 0.57–1)
EOSINOPHIL # BLD AUTO: 0 X10E3/UL (ref 0–0.4)
EOSINOPHIL NFR BLD AUTO: 0 %
ERYTHROCYTE [DISTWIDTH] IN BLOOD BY AUTOMATED COUNT: 15 % (ref 11.7–15.4)
GLOBULIN SER CALC-MCNC: 2.3 G/DL (ref 1.5–4.5)
GLUCOSE SERPL-MCNC: 274 MG/DL (ref 65–99)
HCT VFR BLD AUTO: 38 % (ref 34–46.6)
HGB BLD-MCNC: 12.8 G/DL (ref 11.1–15.9)
IMM GRANULOCYTES # BLD AUTO: 0.4 X10E3/UL (ref 0–0.1)
IMM GRANULOCYTES NFR BLD AUTO: 5 %
LYMPHOCYTES # BLD AUTO: 2.8 X10E3/UL (ref 0.7–3.1)
LYMPHOCYTES NFR BLD AUTO: 34 %
MAGNESIUM SERPL-MCNC: 2 MG/DL (ref 1.6–2.3)
MCH RBC QN AUTO: 34.3 PG (ref 26.6–33)
MCHC RBC AUTO-ENTMCNC: 33.7 G/DL (ref 31.5–35.7)
MCV RBC AUTO: 102 FL (ref 79–97)
MONOCYTES # BLD AUTO: 0.6 X10E3/UL (ref 0.1–0.9)
MONOCYTES NFR BLD AUTO: 7 %
NEUTROPHILS # BLD AUTO: 4.5 X10E3/UL (ref 1.4–7)
NEUTROPHILS NFR BLD AUTO: 53 %
NRBC BLD AUTO-RTO: 1 % (ref 0–0)
PHOSPHATE SERPL-MCNC: 3.3 MG/DL (ref 3–4.3)
PLATELET # BLD AUTO: 148 X10E3/UL (ref 150–450)
POTASSIUM SERPL-SCNC: 3.8 MMOL/L (ref 3.5–5.2)
PREALB SERPL-MCNC: 37 MG/DL (ref 12–34)
PROT SERPL-MCNC: 5.6 G/DL (ref 6–8.5)
RBC # BLD AUTO: 3.73 X10E6/UL (ref 3.77–5.28)
SODIUM SERPL-SCNC: 137 MMOL/L (ref 134–144)
TRIGL SERPL-MCNC: 170 MG/DL (ref 0–149)
WBC # BLD AUTO: 8.5 X10E3/UL (ref 3.4–10.8)

## 2020-04-19 VITALS
RESPIRATION RATE: 15 BRPM | SYSTOLIC BLOOD PRESSURE: 137 MMHG | OXYGEN SATURATION: 99 % | DIASTOLIC BLOOD PRESSURE: 92 MMHG | TEMPERATURE: 97 F | HEART RATE: 76 BPM

## 2020-04-20 ENCOUNTER — HOME CARE VISIT (OUTPATIENT)
Dept: SCHEDULING | Facility: HOME HEALTH | Age: 45
End: 2020-04-20
Payer: MEDICARE

## 2020-04-20 ENCOUNTER — HOME CARE VISIT (OUTPATIENT)
Dept: HOME HEALTH SERVICES | Facility: HOME HEALTH | Age: 45
End: 2020-04-20
Payer: MEDICARE

## 2020-04-22 ENCOUNTER — HOME CARE VISIT (OUTPATIENT)
Dept: HOME HEALTH SERVICES | Facility: HOME HEALTH | Age: 45
End: 2020-04-22
Payer: MEDICARE

## 2020-04-22 ENCOUNTER — HOME CARE VISIT (OUTPATIENT)
Dept: SCHEDULING | Facility: HOME HEALTH | Age: 45
End: 2020-04-22
Payer: MEDICARE

## 2020-04-22 PROCEDURE — G0300 HHS/HOSPICE OF LPN EA 15 MIN: HCPCS

## 2020-04-22 PROCEDURE — 400014 HH F/U

## 2020-04-25 VITALS
TEMPERATURE: 98.3 F | HEART RATE: 74 BPM | RESPIRATION RATE: 18 BRPM | OXYGEN SATURATION: 99 % | DIASTOLIC BLOOD PRESSURE: 70 MMHG | SYSTOLIC BLOOD PRESSURE: 116 MMHG

## 2020-04-28 ENCOUNTER — HOME CARE VISIT (OUTPATIENT)
Dept: HOME HEALTH SERVICES | Facility: HOME HEALTH | Age: 45
End: 2020-04-28
Payer: MEDICARE

## 2020-04-28 ENCOUNTER — HOME CARE VISIT (OUTPATIENT)
Dept: SCHEDULING | Facility: HOME HEALTH | Age: 45
End: 2020-04-28
Payer: MEDICARE

## 2020-04-28 VITALS
SYSTOLIC BLOOD PRESSURE: 111 MMHG | RESPIRATION RATE: 18 BRPM | TEMPERATURE: 97.2 F | OXYGEN SATURATION: 97 % | HEART RATE: 60 BPM | DIASTOLIC BLOOD PRESSURE: 80 MMHG

## 2020-04-28 PROCEDURE — G0300 HHS/HOSPICE OF LPN EA 15 MIN: HCPCS

## 2020-05-06 ENCOUNTER — HOME CARE VISIT (OUTPATIENT)
Dept: HOME HEALTH SERVICES | Facility: HOME HEALTH | Age: 45
End: 2020-05-06
Payer: MEDICARE

## 2020-05-06 ENCOUNTER — HOME CARE VISIT (OUTPATIENT)
Dept: SCHEDULING | Facility: HOME HEALTH | Age: 45
End: 2020-05-06
Payer: MEDICARE

## 2020-05-06 VITALS
DIASTOLIC BLOOD PRESSURE: 81 MMHG | TEMPERATURE: 97.4 F | HEART RATE: 71 BPM | RESPIRATION RATE: 18 BRPM | OXYGEN SATURATION: 96 % | SYSTOLIC BLOOD PRESSURE: 125 MMHG

## 2020-05-06 PROCEDURE — G0300 HHS/HOSPICE OF LPN EA 15 MIN: HCPCS

## 2020-05-12 ENCOUNTER — HOME CARE VISIT (OUTPATIENT)
Dept: SCHEDULING | Facility: HOME HEALTH | Age: 45
End: 2020-05-12
Payer: MEDICARE

## 2020-05-12 ENCOUNTER — HOME CARE VISIT (OUTPATIENT)
Dept: HOME HEALTH SERVICES | Facility: HOME HEALTH | Age: 45
End: 2020-05-12
Payer: MEDICARE

## 2020-05-12 VITALS
TEMPERATURE: 97 F | RESPIRATION RATE: 18 BRPM | SYSTOLIC BLOOD PRESSURE: 124 MMHG | HEART RATE: 76 BPM | OXYGEN SATURATION: 99 % | DIASTOLIC BLOOD PRESSURE: 81 MMHG

## 2020-05-12 PROCEDURE — G0300 HHS/HOSPICE OF LPN EA 15 MIN: HCPCS

## 2020-05-14 ENCOUNTER — HOME CARE VISIT (OUTPATIENT)
Dept: SCHEDULING | Facility: HOME HEALTH | Age: 45
End: 2020-05-14
Payer: MEDICARE

## 2020-05-14 ENCOUNTER — HOME CARE VISIT (OUTPATIENT)
Dept: HOME HEALTH SERVICES | Facility: HOME HEALTH | Age: 45
End: 2020-05-14
Payer: MEDICARE

## 2020-05-14 VITALS
RESPIRATION RATE: 18 BRPM | TEMPERATURE: 98.2 F | HEART RATE: 86 BPM | DIASTOLIC BLOOD PRESSURE: 76 MMHG | SYSTOLIC BLOOD PRESSURE: 122 MMHG | OXYGEN SATURATION: 99 %

## 2020-05-14 LAB
ALBUMIN SERPL-MCNC: 3.6 G/DL (ref 3.8–4.8)
ALBUMIN/GLOB SERPL: 1.9 {RATIO} (ref 1.2–2.2)
ALP SERPL-CCNC: 74 IU/L (ref 39–117)
ALT SERPL-CCNC: 28 IU/L (ref 0–32)
AST SERPL-CCNC: 21 IU/L (ref 0–40)
BASOPHILS # BLD AUTO: 0.1 X10E3/UL (ref 0–0.2)
BASOPHILS NFR BLD AUTO: 1 %
BILIRUB SERPL-MCNC: 0.9 MG/DL (ref 0–1.2)
BUN SERPL-MCNC: 23 MG/DL (ref 6–24)
BUN/CREAT SERPL: 36 (ref 9–23)
CALCIUM SERPL-MCNC: 9 MG/DL (ref 8.7–10.2)
CHLORIDE SERPL-SCNC: 101 MMOL/L (ref 96–106)
CO2 SERPL-SCNC: 24 MMOL/L (ref 20–29)
CREAT SERPL-MCNC: 0.64 MG/DL (ref 0.57–1)
EOSINOPHIL # BLD AUTO: 0 X10E3/UL (ref 0–0.4)
EOSINOPHIL NFR BLD AUTO: 0 %
ERYTHROCYTE [DISTWIDTH] IN BLOOD BY AUTOMATED COUNT: 15.9 % (ref 11.7–15.4)
GLOBULIN SER CALC-MCNC: 1.9 G/DL (ref 1.5–4.5)
GLUCOSE SERPL-MCNC: ABNORMAL MG/DL
HCT VFR BLD AUTO: 36.6 % (ref 34–46.6)
HGB BLD-MCNC: 12.9 G/DL (ref 11.1–15.9)
IMM GRANULOCYTES # BLD AUTO: 0.3 X10E3/UL (ref 0–0.1)
IMM GRANULOCYTES NFR BLD AUTO: 3 %
LYMPHOCYTES # BLD AUTO: 2.8 X10E3/UL (ref 0.7–3.1)
LYMPHOCYTES NFR BLD AUTO: 27 %
MAGNESIUM SERPL-MCNC: 1.7 MG/DL (ref 1.6–2.3)
MCH RBC QN AUTO: 36 PG (ref 26.6–33)
MCHC RBC AUTO-ENTMCNC: 35.2 G/DL (ref 31.5–35.7)
MCV RBC AUTO: 102 FL (ref 79–97)
MONOCYTES # BLD AUTO: 0.8 X10E3/UL (ref 0.1–0.9)
MONOCYTES NFR BLD AUTO: 7 %
NEUTROPHILS # BLD AUTO: 6.6 X10E3/UL (ref 1.4–7)
NEUTROPHILS NFR BLD AUTO: 62 %
PHOSPHATE SERPL-MCNC: NORMAL MG/DL
PLATELET # BLD AUTO: 210 X10E3/UL (ref 150–450)
POTASSIUM SERPL-SCNC: ABNORMAL MMOL/L
PREALB SERPL-MCNC: 37 MG/DL (ref 12–34)
PROT SERPL-MCNC: 5.5 G/DL (ref 6–8.5)
RBC # BLD AUTO: 3.58 X10E6/UL (ref 3.77–5.28)
SODIUM SERPL-SCNC: 142 MMOL/L (ref 134–144)
TRIGL SERPL-MCNC: 137 MG/DL (ref 0–149)
WBC # BLD AUTO: 10.4 X10E3/UL (ref 3.4–10.8)

## 2020-05-14 PROCEDURE — G0300 HHS/HOSPICE OF LPN EA 15 MIN: HCPCS

## 2020-05-15 LAB
GLUCOSE SERPL-MCNC: 77 MG/DL (ref 65–99)
PHOSPHATE SERPL-MCNC: 2.3 MG/DL (ref 3–4.3)
POTASSIUM SERPL-SCNC: 3 MMOL/L (ref 3.5–5.2)

## 2020-05-18 ENCOUNTER — HOME CARE VISIT (OUTPATIENT)
Dept: HOME HEALTH SERVICES | Facility: HOME HEALTH | Age: 45
End: 2020-05-18
Payer: MEDICARE

## 2020-05-18 ENCOUNTER — HOME CARE VISIT (OUTPATIENT)
Dept: SCHEDULING | Facility: HOME HEALTH | Age: 45
End: 2020-05-18
Payer: MEDICARE

## 2020-05-18 VITALS
SYSTOLIC BLOOD PRESSURE: 134 MMHG | DIASTOLIC BLOOD PRESSURE: 87 MMHG | TEMPERATURE: 97.6 F | HEART RATE: 90 BPM | OXYGEN SATURATION: 99 % | RESPIRATION RATE: 20 BRPM

## 2020-05-18 PROCEDURE — G0300 HHS/HOSPICE OF LPN EA 15 MIN: HCPCS

## 2020-05-27 ENCOUNTER — HOME CARE VISIT (OUTPATIENT)
Dept: HOME HEALTH SERVICES | Facility: HOME HEALTH | Age: 45
End: 2020-05-27
Payer: MEDICARE

## 2020-05-27 ENCOUNTER — HOME CARE VISIT (OUTPATIENT)
Dept: SCHEDULING | Facility: HOME HEALTH | Age: 45
End: 2020-05-27
Payer: MEDICARE

## 2020-05-27 PROCEDURE — 400014 HH F/U

## 2020-05-27 PROCEDURE — G0300 HHS/HOSPICE OF LPN EA 15 MIN: HCPCS

## 2020-05-31 VITALS
RESPIRATION RATE: 18 BRPM | HEART RATE: 61 BPM | TEMPERATURE: 96.9 F | DIASTOLIC BLOOD PRESSURE: 75 MMHG | OXYGEN SATURATION: 98 % | SYSTOLIC BLOOD PRESSURE: 119 MMHG

## 2020-06-03 ENCOUNTER — HOME CARE VISIT (OUTPATIENT)
Dept: HOME HEALTH SERVICES | Facility: HOME HEALTH | Age: 45
End: 2020-06-03
Payer: MEDICARE

## 2020-06-03 ENCOUNTER — HOME CARE VISIT (OUTPATIENT)
Dept: SCHEDULING | Facility: HOME HEALTH | Age: 45
End: 2020-06-03
Payer: MEDICARE

## 2020-06-03 VITALS
HEART RATE: 74 BPM | SYSTOLIC BLOOD PRESSURE: 124 MMHG | OXYGEN SATURATION: 98 % | DIASTOLIC BLOOD PRESSURE: 84 MMHG | TEMPERATURE: 97.2 F | RESPIRATION RATE: 20 BRPM

## 2020-06-03 PROCEDURE — G0300 HHS/HOSPICE OF LPN EA 15 MIN: HCPCS

## 2020-06-09 ENCOUNTER — HOME CARE VISIT (OUTPATIENT)
Dept: SCHEDULING | Facility: HOME HEALTH | Age: 45
End: 2020-06-09
Payer: MEDICARE

## 2020-06-09 ENCOUNTER — HOME CARE VISIT (OUTPATIENT)
Dept: HOME HEALTH SERVICES | Facility: HOME HEALTH | Age: 45
End: 2020-06-09
Payer: MEDICARE

## 2020-06-09 VITALS
OXYGEN SATURATION: 99 % | DIASTOLIC BLOOD PRESSURE: 75 MMHG | TEMPERATURE: 97.8 F | RESPIRATION RATE: 18 BRPM | SYSTOLIC BLOOD PRESSURE: 114 MMHG | HEART RATE: 97 BPM

## 2020-06-09 PROCEDURE — G0300 HHS/HOSPICE OF LPN EA 15 MIN: HCPCS

## 2020-06-11 LAB
ALBUMIN SERPL-MCNC: 3.3 G/DL (ref 3.8–4.8)
ALBUMIN/GLOB SERPL: 1.5 {RATIO} (ref 1.2–2.2)
ALP SERPL-CCNC: 76 IU/L (ref 39–117)
ALT SERPL-CCNC: 13 IU/L (ref 0–32)
AST SERPL-CCNC: 17 IU/L (ref 0–40)
BASOPHILS # BLD AUTO: 0 X10E3/UL (ref 0–0.2)
BASOPHILS NFR BLD AUTO: 0 %
BILIRUB SERPL-MCNC: 0.6 MG/DL (ref 0–1.2)
BUN SERPL-MCNC: 18 MG/DL (ref 6–24)
BUN/CREAT SERPL: 30 (ref 9–23)
CALCIUM SERPL-MCNC: 8.9 MG/DL (ref 8.7–10.2)
CHLORIDE SERPL-SCNC: 100 MMOL/L (ref 96–106)
CO2 SERPL-SCNC: 27 MMOL/L (ref 20–29)
CORTIS AM PEAK SERPL-MCNC: 1 UG/DL (ref 6.2–19.4)
CREAT SERPL-MCNC: 0.6 MG/DL (ref 0.57–1)
EOSINOPHIL # BLD AUTO: 0.1 X10E3/UL (ref 0–0.4)
EOSINOPHIL NFR BLD AUTO: 2 %
ERYTHROCYTE [DISTWIDTH] IN BLOOD BY AUTOMATED COUNT: 13.7 % (ref 11.7–15.4)
FT4I SERPL CALC-MCNC: 5.5 (ref 1.2–4.9)
GLOBULIN SER CALC-MCNC: 2.2 G/DL (ref 1.5–4.5)
GLUCOSE SERPL-MCNC: 110 MG/DL (ref 65–99)
HCT VFR BLD AUTO: 37.4 % (ref 34–46.6)
HGB BLD-MCNC: 12.8 G/DL (ref 11.1–15.9)
LYMPHOCYTES # BLD AUTO: 2.8 X10E3/UL (ref 0.7–3.1)
LYMPHOCYTES NFR BLD AUTO: 38 %
MAGNESIUM SERPL-MCNC: 1.9 MG/DL (ref 1.6–2.3)
MCH RBC QN AUTO: 35 PG (ref 26.6–33)
MCHC RBC AUTO-ENTMCNC: 34.2 G/DL (ref 31.5–35.7)
MCV RBC AUTO: 102 FL (ref 79–97)
MONOCYTES # BLD AUTO: 0.8 X10E3/UL (ref 0.1–0.9)
MONOCYTES NFR BLD AUTO: 11 %
NEUTROPHILS # BLD AUTO: 3.6 X10E3/UL (ref 1.4–7)
NEUTROPHILS NFR BLD AUTO: 49 %
PHOSPHATE SERPL-MCNC: 3.6 MG/DL (ref 3–4.3)
PLATELET # BLD AUTO: 282 X10E3/UL (ref 150–450)
POTASSIUM SERPL-SCNC: 4.3 MMOL/L (ref 3.5–5.2)
PREALB SERPL-MCNC: 24 MG/DL (ref 12–34)
PROT SERPL-MCNC: 5.5 G/DL (ref 6–8.5)
RBC # BLD AUTO: 3.66 X10E6/UL (ref 3.77–5.28)
SODIUM SERPL-SCNC: 140 MMOL/L (ref 134–144)
T3RU NFR SERPL: 40 % (ref 24–39)
T4 SERPL-MCNC: 13.8 UG/DL (ref 4.5–12)
TRIGL SERPL-MCNC: 73 MG/DL (ref 0–149)
TSH SERPL DL<=0.005 MIU/L-ACNC: 0.03 UIU/ML (ref 0.45–4.5)
WBC # BLD AUTO: 7.3 X10E3/UL (ref 3.4–10.8)

## 2020-06-17 ENCOUNTER — HOME CARE VISIT (OUTPATIENT)
Dept: SCHEDULING | Facility: HOME HEALTH | Age: 45
End: 2020-06-17
Payer: MEDICARE

## 2020-06-17 PROCEDURE — G0299 HHS/HOSPICE OF RN EA 15 MIN: HCPCS

## 2020-06-18 VITALS
SYSTOLIC BLOOD PRESSURE: 123 MMHG | RESPIRATION RATE: 17 BRPM | HEART RATE: 79 BPM | OXYGEN SATURATION: 99 % | TEMPERATURE: 98.6 F | DIASTOLIC BLOOD PRESSURE: 79 MMHG

## 2020-06-23 ENCOUNTER — HOME CARE VISIT (OUTPATIENT)
Dept: HOME HEALTH SERVICES | Facility: HOME HEALTH | Age: 45
End: 2020-06-23
Payer: MEDICARE

## 2020-06-23 ENCOUNTER — HOME CARE VISIT (OUTPATIENT)
Dept: SCHEDULING | Facility: HOME HEALTH | Age: 45
End: 2020-06-23
Payer: MEDICARE

## 2020-06-23 VITALS
OXYGEN SATURATION: 99 % | TEMPERATURE: 97 F | RESPIRATION RATE: 18 BRPM | DIASTOLIC BLOOD PRESSURE: 71 MMHG | SYSTOLIC BLOOD PRESSURE: 111 MMHG | HEART RATE: 64 BPM

## 2020-06-23 PROCEDURE — G0300 HHS/HOSPICE OF LPN EA 15 MIN: HCPCS

## 2020-06-23 PROCEDURE — 400014 HH F/U

## 2020-07-01 ENCOUNTER — HOME CARE VISIT (OUTPATIENT)
Dept: SCHEDULING | Facility: HOME HEALTH | Age: 45
End: 2020-07-01
Payer: MEDICARE

## 2020-07-01 ENCOUNTER — HOME CARE VISIT (OUTPATIENT)
Dept: HOME HEALTH SERVICES | Facility: HOME HEALTH | Age: 45
End: 2020-07-01
Payer: MEDICARE

## 2020-07-01 PROCEDURE — G0300 HHS/HOSPICE OF LPN EA 15 MIN: HCPCS

## 2020-07-02 VITALS
HEART RATE: 80 BPM | OXYGEN SATURATION: 99 % | DIASTOLIC BLOOD PRESSURE: 87 MMHG | SYSTOLIC BLOOD PRESSURE: 128 MMHG | RESPIRATION RATE: 18 BRPM | TEMPERATURE: 97.7 F

## 2020-07-09 ENCOUNTER — HOME CARE VISIT (OUTPATIENT)
Dept: HOME HEALTH SERVICES | Facility: HOME HEALTH | Age: 45
End: 2020-07-09
Payer: MEDICARE

## 2020-07-09 ENCOUNTER — HOME CARE VISIT (OUTPATIENT)
Dept: SCHEDULING | Facility: HOME HEALTH | Age: 45
End: 2020-07-09
Payer: MEDICARE

## 2020-07-09 VITALS
DIASTOLIC BLOOD PRESSURE: 80 MMHG | OXYGEN SATURATION: 99 % | TEMPERATURE: 97.1 F | RESPIRATION RATE: 18 BRPM | HEART RATE: 84 BPM | SYSTOLIC BLOOD PRESSURE: 122 MMHG

## 2020-07-09 PROCEDURE — G0300 HHS/HOSPICE OF LPN EA 15 MIN: HCPCS

## 2020-07-13 ENCOUNTER — TELEPHONE (OUTPATIENT)
Dept: SURGERY | Age: 45
End: 2020-07-13

## 2020-07-13 ENCOUNTER — HOME CARE VISIT (OUTPATIENT)
Dept: HOME HEALTH SERVICES | Facility: HOME HEALTH | Age: 45
End: 2020-07-13
Payer: MEDICARE

## 2020-07-13 ENCOUNTER — HOME CARE VISIT (OUTPATIENT)
Dept: SCHEDULING | Facility: HOME HEALTH | Age: 45
End: 2020-07-13
Payer: MEDICARE

## 2020-07-13 VITALS
SYSTOLIC BLOOD PRESSURE: 120 MMHG | BODY MASS INDEX: 43.77 KG/M2 | TEMPERATURE: 97.2 F | DIASTOLIC BLOOD PRESSURE: 78 MMHG | OXYGEN SATURATION: 99 % | WEIGHT: 255 LBS | RESPIRATION RATE: 18 BRPM | HEART RATE: 70 BPM

## 2020-07-13 PROCEDURE — G0300 HHS/HOSPICE OF LPN EA 15 MIN: HCPCS

## 2020-07-13 NOTE — TELEPHONE ENCOUNTER
Patient identified with two patient identifiers. Spoke to pt and her Mother Lawerence McCalla on speaker phone. Pt did not get the [employer] Fistula Wafers 3 boxes of 5 each item # [id number] from [employer] this month. They stated [employer] told them they faxed a new order last week for Dr Lopez Rosado to sign but have not received it yet. They are going to call back and see if they can get another order faxed this week but wanted to know if Dr Lopez Rosado could put this on a prescription paper and fax to [employer].

## 2020-07-13 NOTE — TELEPHONE ENCOUNTER
Patient's mother called stating she is almost out of her med supplies and stated that [employer] has told her that they have faxed twice a authorization order and hasn't heard back. Patient's mother would like to speak with nurse.

## 2020-07-16 ENCOUNTER — TELEPHONE (OUTPATIENT)
Dept: SURGERY | Age: 45
End: 2020-07-16

## 2020-07-16 NOTE — TELEPHONE ENCOUNTER
Patient states order was faxed on Tuesday and she was following up. Patient informed orders received signed and faxed back.

## 2020-07-20 ENCOUNTER — TELEPHONE (OUTPATIENT)
Dept: SURGERY | Age: 45
End: 2020-07-20

## 2020-07-20 NOTE — TELEPHONE ENCOUNTER
Please call pt back. Pt has been going back and forth with [employer] about her patches being placed. Pt stated that [employer] has not received signed forms and needs them asap. Fax: [phone]    E-mail: [email or web address]

## 2020-07-21 ENCOUNTER — TELEPHONE (OUTPATIENT)
Dept: SURGERY | Age: 45
End: 2020-07-21

## 2020-07-21 ENCOUNTER — HOME CARE VISIT (OUTPATIENT)
Dept: SCHEDULING | Facility: HOME HEALTH | Age: 45
End: 2020-07-21
Payer: MEDICARE

## 2020-07-21 PROCEDURE — G0299 HHS/HOSPICE OF RN EA 15 MIN: HCPCS

## 2020-07-21 PROCEDURE — 400014 HH F/U

## 2020-07-21 NOTE — TELEPHONE ENCOUNTER
Patient identified with two patient identifiers. Informed we received call from mom but every time I dial her number it does not connect. Informed her I spoke with [employer] and the particular form they need they have been faxing to the incorrect fax number. Correct number given. Currently waiting on fax will have provider review and sign. She will return call if any other questions or concerns.

## 2020-07-21 NOTE — TELEPHONE ENCOUNTER
Mother of patient called and stated that patient is out of supplies and that [facility] [employer] has not received the fax from our office. She would like a call back so she can get the patient's supplies soon. She stated she has called 3x's with no call back.

## 2020-07-27 ENCOUNTER — HOME CARE VISIT (OUTPATIENT)
Dept: SCHEDULING | Facility: HOME HEALTH | Age: 45
End: 2020-07-27
Payer: MEDICARE

## 2020-07-27 ENCOUNTER — HOME CARE VISIT (OUTPATIENT)
Dept: HOME HEALTH SERVICES | Facility: HOME HEALTH | Age: 45
End: 2020-07-27
Payer: MEDICARE

## 2020-07-27 VITALS
TEMPERATURE: 97.5 F | SYSTOLIC BLOOD PRESSURE: 116 MMHG | OXYGEN SATURATION: 99 % | RESPIRATION RATE: 18 BRPM | DIASTOLIC BLOOD PRESSURE: 78 MMHG | HEART RATE: 84 BPM

## 2020-07-27 PROCEDURE — G0300 HHS/HOSPICE OF LPN EA 15 MIN: HCPCS

## 2020-07-29 VITALS
TEMPERATURE: 98.1 F | RESPIRATION RATE: 14 BRPM | OXYGEN SATURATION: 99 % | HEART RATE: 80 BPM | SYSTOLIC BLOOD PRESSURE: 143 MMHG | DIASTOLIC BLOOD PRESSURE: 89 MMHG

## 2020-08-03 ENCOUNTER — HOME CARE VISIT (OUTPATIENT)
Dept: SCHEDULING | Facility: HOME HEALTH | Age: 45
End: 2020-08-03
Payer: MEDICARE

## 2020-08-03 VITALS
TEMPERATURE: 96.5 F | BODY MASS INDEX: 44.97 KG/M2 | OXYGEN SATURATION: 99 % | WEIGHT: 262 LBS | DIASTOLIC BLOOD PRESSURE: 72 MMHG | SYSTOLIC BLOOD PRESSURE: 116 MMHG | RESPIRATION RATE: 18 BRPM | HEART RATE: 75 BPM

## 2020-08-03 PROCEDURE — G0300 HHS/HOSPICE OF LPN EA 15 MIN: HCPCS

## 2020-08-11 ENCOUNTER — HOME CARE VISIT (OUTPATIENT)
Dept: SCHEDULING | Facility: HOME HEALTH | Age: 45
End: 2020-08-11
Payer: MEDICARE

## 2020-08-11 VITALS
RESPIRATION RATE: 20 BRPM | DIASTOLIC BLOOD PRESSURE: 87 MMHG | SYSTOLIC BLOOD PRESSURE: 129 MMHG | OXYGEN SATURATION: 99 % | TEMPERATURE: 97.1 F | HEART RATE: 84 BPM

## 2020-08-11 PROCEDURE — G0300 HHS/HOSPICE OF LPN EA 15 MIN: HCPCS

## 2020-08-18 ENCOUNTER — HOME CARE VISIT (OUTPATIENT)
Dept: SCHEDULING | Facility: HOME HEALTH | Age: 45
End: 2020-08-18
Payer: MEDICARE

## 2020-08-18 PROCEDURE — G0299 HHS/HOSPICE OF RN EA 15 MIN: HCPCS

## 2020-08-19 VITALS
DIASTOLIC BLOOD PRESSURE: 81 MMHG | HEART RATE: 79 BPM | OXYGEN SATURATION: 99 % | SYSTOLIC BLOOD PRESSURE: 131 MMHG | RESPIRATION RATE: 16 BRPM | TEMPERATURE: 97.7 F

## 2020-08-25 ENCOUNTER — HOME CARE VISIT (OUTPATIENT)
Dept: SCHEDULING | Facility: HOME HEALTH | Age: 45
End: 2020-08-25
Payer: MEDICARE

## 2020-08-25 PROCEDURE — G0300 HHS/HOSPICE OF LPN EA 15 MIN: HCPCS

## 2020-08-25 PROCEDURE — 400014 HH F/U

## 2020-08-28 VITALS
RESPIRATION RATE: 18 BRPM | TEMPERATURE: 97.2 F | DIASTOLIC BLOOD PRESSURE: 70 MMHG | OXYGEN SATURATION: 99 % | HEART RATE: 74 BPM | SYSTOLIC BLOOD PRESSURE: 128 MMHG

## 2020-09-01 ENCOUNTER — HOME CARE VISIT (OUTPATIENT)
Dept: SCHEDULING | Facility: HOME HEALTH | Age: 45
End: 2020-09-01
Payer: MEDICARE

## 2020-09-01 PROCEDURE — G0299 HHS/HOSPICE OF RN EA 15 MIN: HCPCS

## 2020-09-04 ENCOUNTER — HOSPITAL ENCOUNTER (OUTPATIENT)
Dept: CT IMAGING | Age: 45
Discharge: HOME | End: 2020-09-04
Attending: INTERNAL MEDICINE
Payer: MEDICARE

## 2020-09-04 DIAGNOSIS — K50.80 CROHN'S DISEASE, SMALL AND LARGE INTESTINE (HCC): ICD-10-CM

## 2020-09-04 DIAGNOSIS — R10.32 LLQ PAIN: ICD-10-CM

## 2020-09-04 PROCEDURE — 74177 CT ABD & PELVIS W/CONTRAST: CPT

## 2020-09-04 PROCEDURE — 74011000636 HC RX REV CODE- 636: Performed by: RADIOLOGY

## 2020-09-04 PROCEDURE — 74011000258 HC RX REV CODE- 258: Performed by: RADIOLOGY

## 2020-09-04 RX ORDER — SODIUM CHLORIDE 0.9 % (FLUSH) 0.9 %
10 SYRINGE (ML) INJECTION
Status: COMPLETED | OUTPATIENT
Start: 2020-09-04 | End: 2020-09-04

## 2020-09-04 RX ADMIN — SODIUM CHLORIDE 100 ML: 900 INJECTION, SOLUTION INTRAVENOUS at 10:37

## 2020-09-04 RX ADMIN — Medication 10 ML: at 10:37

## 2020-09-04 RX ADMIN — IOPAMIDOL 100 ML: 755 INJECTION, SOLUTION INTRAVENOUS at 10:37

## 2020-09-06 VITALS
DIASTOLIC BLOOD PRESSURE: 70 MMHG | TEMPERATURE: 98 F | OXYGEN SATURATION: 98 % | RESPIRATION RATE: 16 BRPM | HEART RATE: 78 BPM | SYSTOLIC BLOOD PRESSURE: 122 MMHG

## 2020-09-09 ENCOUNTER — HOME CARE VISIT (OUTPATIENT)
Dept: SCHEDULING | Facility: HOME HEALTH | Age: 45
End: 2020-09-09
Payer: MEDICARE

## 2020-09-09 PROCEDURE — G0300 HHS/HOSPICE OF LPN EA 15 MIN: HCPCS

## 2020-09-12 VITALS
RESPIRATION RATE: 18 BRPM | OXYGEN SATURATION: 99 % | SYSTOLIC BLOOD PRESSURE: 114 MMHG | TEMPERATURE: 97.4 F | DIASTOLIC BLOOD PRESSURE: 74 MMHG | HEART RATE: 78 BPM

## 2020-09-14 ENCOUNTER — TELEPHONE (OUTPATIENT)
Dept: SURGERY | Age: 45
End: 2020-09-14

## 2020-09-14 NOTE — TELEPHONE ENCOUNTER
Patient called stating [employer] has (2) sent request for her supplies and stated there is three orders that Dr Nikko Jett needs to sign and send back.

## 2020-09-15 ENCOUNTER — HOME CARE VISIT (OUTPATIENT)
Dept: SCHEDULING | Facility: HOME HEALTH | Age: 45
End: 2020-09-15
Payer: MEDICARE

## 2020-09-15 PROCEDURE — G0300 HHS/HOSPICE OF LPN EA 15 MIN: HCPCS

## 2020-09-19 VITALS
OXYGEN SATURATION: 99 % | RESPIRATION RATE: 18 BRPM | DIASTOLIC BLOOD PRESSURE: 78 MMHG | TEMPERATURE: 97.4 F | SYSTOLIC BLOOD PRESSURE: 119 MMHG | HEART RATE: 80 BPM

## 2020-09-21 ENCOUNTER — HOME CARE VISIT (OUTPATIENT)
Dept: SCHEDULING | Facility: HOME HEALTH | Age: 45
End: 2020-09-21
Payer: MEDICARE

## 2020-09-21 PROCEDURE — 400014 HH F/U

## 2020-09-21 PROCEDURE — G0300 HHS/HOSPICE OF LPN EA 15 MIN: HCPCS

## 2020-09-23 VITALS
OXYGEN SATURATION: 99 % | RESPIRATION RATE: 18 BRPM | HEART RATE: 83 BPM | TEMPERATURE: 97.8 F | DIASTOLIC BLOOD PRESSURE: 77 MMHG | SYSTOLIC BLOOD PRESSURE: 109 MMHG

## 2020-09-29 ENCOUNTER — HOME CARE VISIT (OUTPATIENT)
Dept: SCHEDULING | Facility: HOME HEALTH | Age: 45
End: 2020-09-29
Payer: MEDICARE

## 2020-09-29 PROCEDURE — G0300 HHS/HOSPICE OF LPN EA 15 MIN: HCPCS

## 2020-09-30 VITALS
SYSTOLIC BLOOD PRESSURE: 115 MMHG | TEMPERATURE: 97.2 F | HEART RATE: 78 BPM | DIASTOLIC BLOOD PRESSURE: 76 MMHG | RESPIRATION RATE: 20 BRPM | OXYGEN SATURATION: 97 %

## 2020-10-08 ENCOUNTER — HOME CARE VISIT (OUTPATIENT)
Dept: SCHEDULING | Facility: HOME HEALTH | Age: 45
End: 2020-10-08
Payer: MEDICARE

## 2020-10-08 PROCEDURE — G0300 HHS/HOSPICE OF LPN EA 15 MIN: HCPCS

## 2020-10-09 VITALS
DIASTOLIC BLOOD PRESSURE: 85 MMHG | SYSTOLIC BLOOD PRESSURE: 132 MMHG | RESPIRATION RATE: 18 BRPM | HEART RATE: 93 BPM | TEMPERATURE: 97.8 F | OXYGEN SATURATION: 99 %

## 2020-10-09 LAB
ALBUMIN SERPL-MCNC: 3.2 G/DL (ref 3.8–4.8)
ALBUMIN/GLOB SERPL: 1.5 {RATIO} (ref 1.2–2.2)
ALP SERPL-CCNC: 95 IU/L (ref 39–117)
ALT SERPL-CCNC: 9 IU/L (ref 0–32)
AST SERPL-CCNC: 12 IU/L (ref 0–40)
BASOPHILS # BLD AUTO: 0 X10E3/UL (ref 0–0.2)
BASOPHILS NFR BLD AUTO: 0 %
BILIRUB SERPL-MCNC: 0.6 MG/DL (ref 0–1.2)
BUN SERPL-MCNC: 17 MG/DL (ref 6–24)
BUN/CREAT SERPL: 29 (ref 9–23)
CALCIUM SERPL-MCNC: 8.6 MG/DL (ref 8.7–10.2)
CHLORIDE SERPL-SCNC: 99 MMOL/L (ref 96–106)
CO2 SERPL-SCNC: 30 MMOL/L (ref 20–29)
CREAT SERPL-MCNC: 0.59 MG/DL (ref 0.57–1)
EOSINOPHIL # BLD AUTO: 0.3 X10E3/UL (ref 0–0.4)
EOSINOPHIL NFR BLD AUTO: 3 %
ERYTHROCYTE [DISTWIDTH] IN BLOOD BY AUTOMATED COUNT: 13 % (ref 11.7–15.4)
GLOBULIN SER CALC-MCNC: 2.2 G/DL (ref 1.5–4.5)
GLUCOSE SERPL-MCNC: 85 MG/DL (ref 65–99)
HCT VFR BLD AUTO: 38.7 % (ref 34–46.6)
HGB BLD-MCNC: 13.4 G/DL (ref 11.1–15.9)
LYMPHOCYTES # BLD AUTO: 3.5 X10E3/UL (ref 0.7–3.1)
LYMPHOCYTES NFR BLD AUTO: 35 %
MAGNESIUM SERPL-MCNC: 1.9 MG/DL (ref 1.6–2.3)
MCH RBC QN AUTO: 33.8 PG (ref 26.6–33)
MCHC RBC AUTO-ENTMCNC: 34.6 G/DL (ref 31.5–35.7)
MCV RBC AUTO: 98 FL (ref 79–97)
MONOCYTES # BLD AUTO: 1.1 X10E3/UL (ref 0.1–0.9)
MONOCYTES NFR BLD AUTO: 11 %
NEUTROPHILS # BLD AUTO: 5 X10E3/UL (ref 1.4–7)
NEUTROPHILS NFR BLD AUTO: 51 %
PHOSPHATE SERPL-MCNC: 3.2 MG/DL (ref 3–4.3)
PLATELET # BLD AUTO: 246 X10E3/UL (ref 150–450)
POTASSIUM SERPL-SCNC: 4.1 MMOL/L (ref 3.5–5.2)
PREALB SERPL-MCNC: 29 MG/DL (ref 12–34)
PROT SERPL-MCNC: 5.4 G/DL (ref 6–8.5)
RBC # BLD AUTO: 3.96 X10E6/UL (ref 3.77–5.28)
SODIUM SERPL-SCNC: 138 MMOL/L (ref 134–144)
TRIGL SERPL-MCNC: 113 MG/DL (ref 0–149)
WBC # BLD AUTO: 9.9 X10E3/UL (ref 3.4–10.8)

## 2020-10-14 ENCOUNTER — TELEPHONE (OUTPATIENT)
Dept: SURGERY | Age: 45
End: 2020-10-14

## 2020-10-14 NOTE — TELEPHONE ENCOUNTER
Patient called and stated that Dr Joe Boyer had referred her to [facility] with Dr Viki Rodriguez. She stated that Dr Mary Ellen Alan needs a signature on the referral that Dr Joe Boyer sent over for her insurance to help with payment. Patient stated that Dr Mary Ellen Alan might try to reach out to Dr Joe Boyer also.

## 2020-10-16 ENCOUNTER — HOME CARE VISIT (OUTPATIENT)
Dept: SCHEDULING | Facility: HOME HEALTH | Age: 45
End: 2020-10-16
Payer: MEDICARE

## 2020-10-16 VITALS
TEMPERATURE: 97.4 F | DIASTOLIC BLOOD PRESSURE: 81 MMHG | OXYGEN SATURATION: 99 % | SYSTOLIC BLOOD PRESSURE: 129 MMHG | HEART RATE: 56 BPM | RESPIRATION RATE: 16 BRPM

## 2020-10-16 PROCEDURE — G0299 HHS/HOSPICE OF RN EA 15 MIN: HCPCS

## 2020-10-20 ENCOUNTER — HOME CARE VISIT (OUTPATIENT)
Dept: SCHEDULING | Facility: HOME HEALTH | Age: 45
End: 2020-10-20
Payer: MEDICARE

## 2020-10-20 PROCEDURE — G0300 HHS/HOSPICE OF LPN EA 15 MIN: HCPCS

## 2020-10-20 PROCEDURE — 400014 HH F/U

## 2020-10-22 VITALS
TEMPERATURE: 98.1 F | HEART RATE: 88 BPM | DIASTOLIC BLOOD PRESSURE: 76 MMHG | OXYGEN SATURATION: 99 % | SYSTOLIC BLOOD PRESSURE: 112 MMHG | RESPIRATION RATE: 18 BRPM

## 2020-10-28 ENCOUNTER — HOME CARE VISIT (OUTPATIENT)
Dept: SCHEDULING | Facility: HOME HEALTH | Age: 45
End: 2020-10-28
Payer: MEDICARE

## 2020-10-28 PROCEDURE — G0300 HHS/HOSPICE OF LPN EA 15 MIN: HCPCS

## 2020-11-01 VITALS
SYSTOLIC BLOOD PRESSURE: 113 MMHG | OXYGEN SATURATION: 97 % | DIASTOLIC BLOOD PRESSURE: 76 MMHG | TEMPERATURE: 97.6 F | HEART RATE: 68 BPM | RESPIRATION RATE: 18 BRPM

## 2020-11-02 ENCOUNTER — OFFICE VISIT (OUTPATIENT)
Dept: FAMILY MEDICINE CLINIC | Age: 45
End: 2020-11-02
Payer: MEDICARE

## 2020-11-02 VITALS
SYSTOLIC BLOOD PRESSURE: 126 MMHG | BODY MASS INDEX: 43.02 KG/M2 | OXYGEN SATURATION: 98 % | HEART RATE: 102 BPM | HEIGHT: 64 IN | TEMPERATURE: 98.4 F | WEIGHT: 252 LBS | DIASTOLIC BLOOD PRESSURE: 80 MMHG

## 2020-11-02 DIAGNOSIS — K63.2 SMALL BOWEL FISTULA: ICD-10-CM

## 2020-11-02 DIAGNOSIS — E03.9 ACQUIRED HYPOTHYROIDISM: ICD-10-CM

## 2020-11-02 DIAGNOSIS — Z76.89 ENCOUNTER TO ESTABLISH CARE: Primary | ICD-10-CM

## 2020-11-02 DIAGNOSIS — K50.018 CROHN'S DISEASE OF SMALL INTESTINE WITH OTHER COMPLICATION (HCC): ICD-10-CM

## 2020-11-02 DIAGNOSIS — R10.84 GENERALIZED ABDOMINAL PAIN: ICD-10-CM

## 2020-11-02 DIAGNOSIS — K63.2 ENTEROCUTANEOUS FISTULA: ICD-10-CM

## 2020-11-02 DIAGNOSIS — N28.89 RIGHT RENAL MASS: ICD-10-CM

## 2020-11-02 DIAGNOSIS — A09 INTESTINAL INFECTION: ICD-10-CM

## 2020-11-02 DIAGNOSIS — E55.9 VITAMIN D DEFICIENCY: ICD-10-CM

## 2020-11-02 PROBLEM — K55.069 SUPERIOR MESENTERIC ARTERY THROMBOSIS (HCC): Status: RESOLVED | Noted: 2017-06-28 | Resolved: 2020-11-02

## 2020-11-02 PROBLEM — K55.9 SMALL BOWEL ISCHEMIA (HCC): Status: RESOLVED | Noted: 2017-06-28 | Resolved: 2020-11-02

## 2020-11-02 PROCEDURE — 99203 OFFICE O/P NEW LOW 30 MIN: CPT | Performed by: STUDENT IN AN ORGANIZED HEALTH CARE EDUCATION/TRAINING PROGRAM

## 2020-11-02 RX ORDER — NALOXONE HYDROCHLORIDE 4 MG/.1ML
SPRAY NASAL
COMMUNITY
Start: 2020-12-17

## 2020-11-02 RX ORDER — NICOTINE POLACRILEX 2 MG
1000 GUM BUCCAL DAILY
COMMUNITY
End: 2021-06-20

## 2020-11-02 RX ORDER — ADALIMUMAB 40MG/0.4ML
40 KIT SUBCUTANEOUS
COMMUNITY
Start: 2020-10-20

## 2020-11-02 NOTE — PROGRESS NOTES
Subjective:     Chief Complaint   Patient presents with    New Patient     HPI:  Lamar Whyte is a 45 y.o. female presents to establish care. Patient is here to establish care. She has history of Crohn's disease, ostomy, right sided renal mass. She is following with GI  for  Crohn's disease. She is on chronic antibiotics due to discharge into ostomy. On Flagyl and Cipro. She is on cranberry extract to prevent UTIs when she takes antibiotic.     right-sided renal mass was seen on CAT scan, and she was referred to urology. Urology referred her to a urologist oncologist..  She needs another referral for this, as her previous PCPs license  due to illness. Left kidney a small cyst.    She was previously felt to have had diabetes, as her sugars were in the 500s and she was symptomatic. She was actually found to have a blockage in her pancreatic duct according to her, and has improved. She checks her sugars at home once recently she is not having fasting sugar 60-82. She is on TPN. Has a nurse who comes and sees her monthly for TPN, and labs which are is adequate. Does have a pain doctor (Dr. Tena Staley) which prescribes her opioids. She does use Klonopin always been prescribed her previous family medicine doctor. 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 States that she has fistula due to previous colonoscopy, in which the pill was never found. States she has had low vitamin D in the past, she is on supplement for this.     Past Medical History:   Diagnosis Date    Adverse effect of anesthesia     WOKE DURING SURGERY    Arthritis     Blood clot in vein     STOMACH    Crohn's disease ([address] 8/15/2011    Cyst of left kidney     DVT (deep venous thrombosis) ([address] 8/15/2011    LEFT LEG    Edema     GENERALIZED R/T TPN; WAIST DOWN    Incarcerated ventral hernia 8/15/2011    Lupus ([address]     Lyme disease     Psychiatric disorder     ANXIETY    Right flank pain 8/22/2011    Seizures ([address] 1990    LAST AT AGE 17    Stroke [facility]) 1990    age 17;  A WEEK POST OP, HAD SEIZURES AND STROKE, WAS IN COMA FOR A MONTH    Thyroid disease     HYPO-NO MEDS    Uncertain tumor of kidney and ureter, right      Family History   Problem Relation Age of Onset    Hypertension Father     Stroke Father     Other Father         arthritis    Arthritis-osteo Father     Hypertension Mother     Arthritis-osteo Mother     Anesth Problems Neg Hx      Social History     Socioeconomic History    Marital status: SINGLE     Spouse name: Not on file    Number of children: Not on file    Years of education: Not on file    Highest education level: Not on file   Occupational History    Not on file   Social Needs    Financial resource strain: Not on file    Food insecurity     Worry: Not on file     Inability: Not on file    Transportation needs     Medical: Not on file     Non-medical: Not on file   Tobacco Use    Smoking status: Former Smoker     Packs/day: 1.00     Years: 16.00     Pack years: 16.00     Last attempt to quit: 2/27/2017     Years since quitting: 3.6    Smokeless tobacco: Former User    Tobacco comment: OFF AND ON   Substance and Sexual Activity    Alcohol use: No    Drug use: No    Sexual activity: Not on file   Lifestyle    Physical activity     Days per week: Not on file     Minutes per session: Not on file    Stress: Not on file   Relationships    Social connections     Talks on phone: Not on file     Gets together: Not on file     Attends Worship service: Not on file     Active member of club or organization: Not on file     Attends meetings of clubs or organizations: Not on file     Relationship status: Not on file    Intimate partner violence     Fear of current or ex partner: Not on file     Emotionally abused: Not on file     Physically abused: Not on file     Forced sexual activity: Not on file   Other Topics Concern    Not on file   Social History Narrative    Not on file     Current Outpatient Medications on File Prior to Visit   Medication Sig Dispense Refill    cranberry fruit extract (CRANBERRY PO) Take 10,000 Units by mouth daily.  biotin 1 mg cap Take 1,000 mg by mouth daily.  Humira,CF, Pen 40 mg/0.4 mL injection pen Once a week      naloxone (Narcan) 4 mg/actuation nasal spray Narcan 4 mg/actuation nasal spray      metroNIDAZOLE (FlagyL) 500 mg tablet Take 500 mg by mouth three (3) times daily.  ciprofloxacin HCl (Cipro) 500 mg tablet Take 500 mg by mouth two (2) times a day.  predniSONE (DELTASONE) 5 mg tablet Take 5 mg by mouth nightly.  ondansetron (ZOFRAN ODT) 8 mg disintegrating tablet Take 8 mg by mouth every eight (8) hours as needed for Nausea.  potassium chloride SR (KLOR-CON 10) 10 mEq tablet Take 10 mEq by mouth daily.  VASCULAR CATHETER: MLC AND PICC FLUSH PANEL 10 mL by IntraVENous route daily.  heparin sodium,porcine (HEPARIN, PORCINE, IV) 3 mL by IntraVENous route daily.  cholecalciferol (VITAMIN D3) (1000 Units /25 mcg) tablet Take 5,000 Units by mouth daily.  furosemide (LASIX) 20 mg tablet Take 20 mg by mouth as needed (fluid retention).  acetaminophen-caffeine 500-65 mg (EXCEDRINE TENSION HEADACHE) 500-65 mg tab Take 1 Tab by mouth every eight (8) hours as needed for Headache.  azaTHIOprine (IMURAN) 50 mg tablet Take 150 mg by mouth daily.  omega 3-DHA-EPA-fish oil (FISH OIL) 1,000 mg (120 mg-180 mg) capsule Take 1 Cap by mouth daily.  mv-min-C-glutamin-lysine-hb124 (IMMUNE SUPPORT) 250-12.5 mg chew Take 1 Tab by mouth daily.  aspirin (ASPIRIN) 325 mg tablet Take 325 mg by mouth daily.  nystatin (MYCOSTATIN) powder Apply 1 Units to affected area daily as needed for Other (peristomal skin irritation).  TPN ADULT - CENTRAL 2,000 mL by IntraVENous route daily. over 12 hours.  Taper infusion rate up 1 hr/down1 hr      morphine CR (MS CONTIN) 30 mg CR tablet Take 30 mg by mouth as needed for Pain. UP TO 4X DAILY      morphine CR (MS CONTIN) 60 mg CR tablet Take 60 mg by mouth every eight (8) hours.  omeprazole (PRILOSEC) 20 mg capsule Take 20 mg by mouth daily.  heparin sod,porcine/0.9 % NaCl (HEPARIN FLUSH IV) 3 mL by IntraVENous Push route daily. after TPN and post lab draw      sodium chloride (NORMAL SALINE FLUSH) 10-20 mL by IntraVENous Push route daily. before and after TPN and/or labs      dronabinol (MARINOL) 5 mg capsule Take 1 Cap by mouth two (2) times daily as needed (appetite). Max Daily Amount: 10 mg. (Patient taking differently: Take 1 Cap by mouth every six (6) hours as needed for Nausea.) 60 Cap 0    ferrous sulfate 325 mg (65 mg iron) tablet Take 1 Tab by mouth Daily (before breakfast). 30 Tab 2    clonazePAM (KLONOPIN) 0.5 mg tablet Take 1 Tab by mouth two (2) times daily as needed. Max Daily Amount: 1 mg. Indications: anxiety 30 Tab 0    ascorbic acid, vitamin C, (VITAMIN C) 250 mg tablet Take 250 mg by mouth two (2) times a day.  promethazine (PHENERGAN) 25 mg tablet Take 25 mg by mouth every six (6) hours as needed for Nausea.  VITAMIN B-12 5,000 mcg subl Take 5,000 mcg by mouth daily. 1    [DISCONTINUED] morphine IR (MS IR) 30 mg tablet Take 30 mg by mouth every four (4) hours as needed for Pain.  metFORMIN (GLUCOPHAGE) 500 mg tablet Take 500 mg by mouth two (2) times daily (with meals).  FOLIC ACID PO Take 1 mg by mouth daily.  insulin NPH (HUMULIN N) 100 unit/mL (3 mL) inpn 35 Units by SubCUTAneous route every morning.  insulin NPH (HUMULIN N) 100 unit/mL (3 mL) inpn 40 Units by SubCUTAneous route every evening.  pyridoxine, vitamin B6, (VITAMIN B-6) 50 mg tablet Take 50 mg by mouth daily.  [DISCONTINUED] predniSONE (DELTASONE) 20 mg tablet Take 5 mg by mouth nightly.       [DISCONTINUED] predniSONE (Christian Paulson) 10 mg tablet Take 10 mg by mouth daily. Every morning      [DISCONTINUED] predniSONE (DELTASONE) 10 mg tablet Take 25 mg by mouth daily.  [DISCONTINUED] adalimumab (HUMIRA) 80 mg/0.8 mL pnkt injection 80 mg by SubCUTAneous route Once every 2 weeks.  [DISCONTINUED] cholecalciferol, vitamin d3, 10,000 unit cap Take 1 Cap by mouth daily.  [DISCONTINUED] nystatin (MYCOSTATIN) topical cream Apply 1 Units to affected area as needed for Skin Irritation or Itching.  insulin lispro (HUMALOG) 100 unit/mL kwikpen by SubCUTAneous route two (2) times a day. by SubCUTAneous route two (2) times a day. ~ 2 HRS AFTER START OF TPN CHECK BS: (THIS IS ALSO THE  SS SCHEDULE)  SS:  2 UNITS -249  3 UNITS -299  4 UNITS -349  IF GREATER THAN 350 CALL MD      ~1 HR AFTER TPN COMPLETION CHECK BS:  SS:  2 UNITS -199  3 UNITS -249  5 UNITS -299  7 UNITS -349  CALL MD FOR GREATER THAN 350      [DISCONTINUED] ondansetron (ZOFRAN ODT) 4 mg disintegrating tablet Take 8 mg by mouth every eight (8) hours as needed for Nausea.  [DISCONTINUED] albuterol (PROVENTIL HFA, VENTOLIN HFA, PROAIR HFA) 90 mcg/actuation inhaler Take 2 Puffs by inhalation every four (4) hours as needed for Wheezing or Shortness of Breath.  insulin lispro (HUMALOG) 100 unit/mL injection As instructed on discharge instructions (Patient taking differently: by SubCUTAneous route two (2) times a day. ~ 2 HRS AFTER START OF TPN CHECK BS: (THIS IS ALSO THE  SS SCHEDULE)  SS:  2 UNITS -249  3 UNITS -299  4 UNITS -349  IF GREATER THAN 350 CALL MD      ~1 HR AFTER TPN COMPLETION CHECK BS:  SS:  2 UNITS -199  3 UNITS -249  5 UNITS -299  7 UNITS -349  CALL MD FOR GREATER THAN 350) 1 Vial 0    [DISCONTINUED] ondansetron (ZOFRAN ODT) 4 mg disintegrating tablet Take 1 Tab by mouth every eight (8) hours as needed for Nausea.  (Patient taking differently: Take 8 mg by mouth every eight (8) hours as needed for Nausea.) 20 Tab 0     No current facility-administered medications on file prior to visit. Allergies   Allergen Reactions    Sulfa (Sulfonamide Antibiotics) Anaphylaxis    Demerol [Meperidine] Rash    Soma [Carisoprodol] Rash and Nausea Only    Toradol [Ketorolac Tromethamine] Nausea and Vomiting     Review of Systems   Constitutional: Negative for fever. Cardiovascular: Negative for chest pain. Gastrointestinal:        Chronic issues as stated above         Objective:     Vitals:    11/02/20 1346   BP: 126/80   Pulse: (!) 102   Temp: 98.4 °F (36.9 °C)   TempSrc: Temporal   SpO2: 98%   Weight: 252 lb (114.3 kg)   Height: 5' 4\" (1.626 m)     Physical Exam  Constitutional:       Appearance: Normal appearance. She is obese. Comments: In wheelchair   HENT:      Head: Normocephalic and atraumatic. Eyes:      Extraocular Movements: Extraocular movements intact. Conjunctiva/sclera: Conjunctivae normal.   Neck:      Musculoskeletal: Neck supple. Cardiovascular:      Rate and Rhythm: Normal rate and regular rhythm. Pulses: Normal pulses. Heart sounds: Normal heart sounds. No murmur. Pulmonary:      Effort: Pulmonary effort is normal.      Breath sounds: Normal breath sounds. No wheezing. Abdominal:      General: Bowel sounds are normal.      Palpations: Abdomen is soft. Tenderness: There is no abdominal tenderness. Comments: Ostomy noted in left lower quadrant. Enterocutaneous fistula noted in periumbilical area. Skin:     General: Skin is warm and dry. Neurological:      Mental Status: She is alert and oriented to person, place, and time. Mental status is at baseline. Psychiatric:         Mood and Affect: Mood normal.         Behavior: Behavior normal.            Assessment/Plan:     1.  Encounter to establish care       -     Past medical history reviewed as stated above   -     TSH 3RD GENERATION  -     HEMOGLOBIN A1C WITH EAG  - VITAMIN D, 25 HYDROXY  -     LIPID PANEL    2. Acquired hypothyroidism  -     TSH 3RD GENERATION  -     T4, FREE    3. Right renal mass  -     REFERRAL TO UROLOGY    4. Crohn's disease of small intestine with other complication (HCC)\  -follow-up with GI    5. Small bowel fistula  -Follows with GI, and surgeon    6. Generalized abdominal pain  -Follow-up with pain doctor, and GI    7. Enterocutaneous fistula  -Follows with GI, and surgeon    8. Chronic Intestinal infection  -Chronic Cipro and Flagyl    9. Thyroid disorder?  -f/u labs    Follow-up and Dispositions    · Return in about 6 months (around 5/2/2021) for Chronic Conditions.          Nithin Mckee MD

## 2020-11-03 ENCOUNTER — HOME CARE VISIT (OUTPATIENT)
Dept: SCHEDULING | Facility: HOME HEALTH | Age: 45
End: 2020-11-03
Payer: MEDICARE

## 2020-11-03 ENCOUNTER — TELEPHONE (OUTPATIENT)
Dept: SURGERY | Age: 45
End: 2020-11-03

## 2020-11-03 LAB
25(OH)D3+25(OH)D2 SERPL-MCNC: 36.4 NG/ML (ref 30–100)
CHOLEST SERPL-MCNC: 204 MG/DL (ref 100–199)
EST. AVERAGE GLUCOSE BLD GHB EST-MCNC: 100 MG/DL
HBA1C MFR BLD: 5.1 % (ref 4.8–5.6)
HDLC SERPL-MCNC: 58 MG/DL
LDLC SERPL CALC-MCNC: 110 MG/DL (ref 0–99)
T4 FREE SERPL-MCNC: 1.51 NG/DL (ref 0.82–1.77)
TRIGL SERPL-MCNC: 212 MG/DL (ref 0–149)
TSH SERPL DL<=0.005 MIU/L-ACNC: 1.62 UIU/ML (ref 0.45–4.5)
VLDLC SERPL CALC-MCNC: 36 MG/DL (ref 5–40)

## 2020-11-03 PROCEDURE — G0300 HHS/HOSPICE OF LPN EA 15 MIN: HCPCS

## 2020-11-04 PROBLEM — E78.00 PURE HYPERCHOLESTEROLEMIA: Status: ACTIVE | Noted: 2020-11-04

## 2020-11-04 NOTE — PROGRESS NOTES
Please let her know the cholesterol, ASCVD risk is low at 1.4%. no need for medication at this time. Needs to decrease saturated fats, lose weight.   All her other labs were normal.

## 2020-11-11 ENCOUNTER — HOME CARE VISIT (OUTPATIENT)
Dept: SCHEDULING | Facility: HOME HEALTH | Age: 45
End: 2020-11-11
Payer: MEDICARE

## 2020-11-11 PROCEDURE — G0300 HHS/HOSPICE OF LPN EA 15 MIN: HCPCS

## 2020-11-13 ENCOUNTER — TELEPHONE (OUTPATIENT)
Dept: FAMILY MEDICINE CLINIC | Age: 45
End: 2020-11-13

## 2020-11-15 VITALS
TEMPERATURE: 98.6 F | HEART RATE: 68 BPM | SYSTOLIC BLOOD PRESSURE: 128 MMHG | RESPIRATION RATE: 18 BRPM | DIASTOLIC BLOOD PRESSURE: 66 MMHG | OXYGEN SATURATION: 97 %

## 2020-11-18 ENCOUNTER — HOME CARE VISIT (OUTPATIENT)
Dept: SCHEDULING | Facility: HOME HEALTH | Age: 45
End: 2020-11-18
Payer: MEDICARE

## 2020-11-18 VITALS
HEART RATE: 84 BPM | SYSTOLIC BLOOD PRESSURE: 128 MMHG | TEMPERATURE: 98.4 F | OXYGEN SATURATION: 98 % | DIASTOLIC BLOOD PRESSURE: 76 MMHG

## 2020-11-18 PROCEDURE — G0300 HHS/HOSPICE OF LPN EA 15 MIN: HCPCS

## 2020-11-18 PROCEDURE — 400014 HH F/U

## 2020-11-23 PROBLEM — E13.9 SECONDARY DIABETES (HCC): Status: ACTIVE | Noted: 2020-11-23

## 2020-11-25 ENCOUNTER — HOME CARE VISIT (OUTPATIENT)
Dept: SCHEDULING | Facility: HOME HEALTH | Age: 45
End: 2020-11-25
Payer: MEDICARE

## 2020-11-25 PROCEDURE — G0299 HHS/HOSPICE OF RN EA 15 MIN: HCPCS

## 2020-11-29 VITALS
RESPIRATION RATE: 16 BRPM | DIASTOLIC BLOOD PRESSURE: 70 MMHG | HEART RATE: 80 BPM | SYSTOLIC BLOOD PRESSURE: 128 MMHG | OXYGEN SATURATION: 98 % | TEMPERATURE: 97.8 F

## 2020-12-02 ENCOUNTER — HOME CARE VISIT (OUTPATIENT)
Dept: SCHEDULING | Facility: HOME HEALTH | Age: 45
End: 2020-12-02
Payer: MEDICARE

## 2020-12-02 PROCEDURE — G0300 HHS/HOSPICE OF LPN EA 15 MIN: HCPCS

## 2020-12-10 ENCOUNTER — HOME CARE VISIT (OUTPATIENT)
Dept: SCHEDULING | Facility: HOME HEALTH | Age: 45
End: 2020-12-10
Payer: MEDICARE

## 2020-12-10 PROCEDURE — G0300 HHS/HOSPICE OF LPN EA 15 MIN: HCPCS

## 2020-12-13 VITALS
SYSTOLIC BLOOD PRESSURE: 115 MMHG | OXYGEN SATURATION: 97 % | DIASTOLIC BLOOD PRESSURE: 74 MMHG | HEART RATE: 87 BPM | RESPIRATION RATE: 18 BRPM

## 2020-12-18 ENCOUNTER — HOME CARE VISIT (OUTPATIENT)
Dept: SCHEDULING | Facility: HOME HEALTH | Age: 45
End: 2020-12-18
Payer: MEDICARE

## 2020-12-18 VITALS
HEART RATE: 76 BPM | TEMPERATURE: 98.2 F | DIASTOLIC BLOOD PRESSURE: 66 MMHG | RESPIRATION RATE: 18 BRPM | OXYGEN SATURATION: 99 % | SYSTOLIC BLOOD PRESSURE: 105 MMHG

## 2020-12-18 PROCEDURE — 400014 HH F/U

## 2020-12-18 PROCEDURE — G0299 HHS/HOSPICE OF RN EA 15 MIN: HCPCS

## 2020-12-18 NOTE — PROGRESS NOTES
Spoke to pt she said that her Nutrionist Silvia pierce/ [facility]) called her yesterday about these results and made some changes to her meds/TPN. She said that she is unsure of the dose she is on right now. She said that she knows the numbers changed because she has not been feeling well so has not eaten as much. She said that if we had any questions the nutrionist would be able to answer them better and we could contact her at [phone] or [phone].

## 2020-12-18 NOTE — PROGRESS NOTES
Magnesium is low at 1.4, calcium is borderline low, protein is low, albumin low. Home nurse may need to increase TPN rate.

## 2020-12-19 LAB
ADALIMUMAB DRUG LEVEL, 503866: 2.3 UG/ML
ALBUMIN SERPL-MCNC: 2.9 G/DL (ref 3.8–4.8)
ALBUMIN/GLOB SERPL: 1.2 {RATIO} (ref 1.2–2.2)
ALP SERPL-CCNC: 88 IU/L (ref 39–117)
ALT SERPL-CCNC: 9 IU/L (ref 0–32)
ANTI-ADALIMUMAB ANTIBODY, 503867: <25 NG/ML
AST SERPL-CCNC: 19 IU/L (ref 0–40)
BASOPHILS # BLD AUTO: 0 X10E3/UL (ref 0–0.2)
BASOPHILS NFR BLD AUTO: 0 %
BILIRUB SERPL-MCNC: 0.4 MG/DL (ref 0–1.2)
BUN SERPL-MCNC: 13 MG/DL (ref 6–24)
BUN/CREAT SERPL: 19 (ref 9–23)
CALCIUM SERPL-MCNC: 8.6 MG/DL (ref 8.7–10.2)
CHLORIDE SERPL-SCNC: 99 MMOL/L (ref 96–106)
CO2 SERPL-SCNC: 29 MMOL/L (ref 20–29)
CREAT SERPL-MCNC: 0.67 MG/DL (ref 0.57–1)
EOSINOPHIL # BLD AUTO: 0.4 X10E3/UL (ref 0–0.4)
EOSINOPHIL NFR BLD AUTO: 4 %
ERYTHROCYTE [DISTWIDTH] IN BLOOD BY AUTOMATED COUNT: 12.5 % (ref 11.7–15.4)
GLOBULIN SER CALC-MCNC: 2.4 G/DL (ref 1.5–4.5)
GLUCOSE SERPL-MCNC: 178 MG/DL (ref 65–99)
HCT VFR BLD AUTO: 39.1 % (ref 34–46.6)
HGB BLD-MCNC: 13.3 G/DL (ref 11.1–15.9)
LYMPHOCYTES # BLD AUTO: 3.8 X10E3/UL (ref 0.7–3.1)
LYMPHOCYTES NFR BLD AUTO: 42 %
MAGNESIUM SERPL-MCNC: 1.4 MG/DL (ref 1.6–2.3)
MCH RBC QN AUTO: 33.5 PG (ref 26.6–33)
MCHC RBC AUTO-ENTMCNC: 34 G/DL (ref 31.5–35.7)
MCV RBC AUTO: 99 FL (ref 79–97)
MONOCYTES # BLD AUTO: 0.9 X10E3/UL (ref 0.1–0.9)
MONOCYTES NFR BLD AUTO: 10 %
NEUTROPHILS # BLD AUTO: 4.1 X10E3/UL (ref 1.4–7)
NEUTROPHILS NFR BLD AUTO: 44 %
PHOSPHATE SERPL-MCNC: 3.9 MG/DL (ref 3–4.3)
PLATELET # BLD AUTO: 259 X10E3/UL (ref 150–450)
POTASSIUM SERPL-SCNC: 3.6 MMOL/L (ref 3.5–5.2)
PROT SERPL-MCNC: 5.3 G/DL (ref 6–8.5)
RBC # BLD AUTO: 3.97 X10E6/UL (ref 3.77–5.28)
SODIUM SERPL-SCNC: 137 MMOL/L (ref 134–144)
TRIGL SERPL-MCNC: 181 MG/DL (ref 0–149)
WBC # BLD AUTO: 9.1 X10E3/UL (ref 3.4–10.8)

## 2020-12-22 ENCOUNTER — HOME CARE VISIT (OUTPATIENT)
Dept: SCHEDULING | Facility: HOME HEALTH | Age: 45
End: 2020-12-22
Payer: MEDICARE

## 2020-12-22 VITALS
OXYGEN SATURATION: 98 % | DIASTOLIC BLOOD PRESSURE: 79 MMHG | TEMPERATURE: 98.1 F | SYSTOLIC BLOOD PRESSURE: 121 MMHG | RESPIRATION RATE: 18 BRPM | HEART RATE: 70 BPM

## 2020-12-22 PROCEDURE — G0300 HHS/HOSPICE OF LPN EA 15 MIN: HCPCS

## 2020-12-28 ENCOUNTER — TELEPHONE (OUTPATIENT)
Dept: SURGERY | Age: 45
End: 2020-12-28

## 2020-12-28 NOTE — TELEPHONE ENCOUNTER
Returned call to patient. Two patient identifiers used. Patient stated fax was sent from [employer] for her supplies and she wanted to see if it had been signed by Dr. Una Rao. Explained to patient I would check and return call. Fax found in providers mailbox. Returned call to patient and stated fax was located, however the provider is out of the office until next week. I will get the provider to review and sign. Patient stated she is in need of supplies and could another provider sign the order. Explained to patient I would need to get Dr. Una Rao to sign order. Patient asked if I could fax the order to his home as previously done by another nurse. Explained to patient I didn't not have that information.

## 2020-12-29 ENCOUNTER — HOME CARE VISIT (OUTPATIENT)
Dept: SCHEDULING | Facility: HOME HEALTH | Age: 45
End: 2020-12-29
Payer: MEDICARE

## 2020-12-29 VITALS
DIASTOLIC BLOOD PRESSURE: 81 MMHG | OXYGEN SATURATION: 99 % | RESPIRATION RATE: 18 BRPM | SYSTOLIC BLOOD PRESSURE: 122 MMHG | TEMPERATURE: 98 F | HEART RATE: 66 BPM

## 2020-12-29 PROCEDURE — G0300 HHS/HOSPICE OF LPN EA 15 MIN: HCPCS

## 2021-01-05 ENCOUNTER — HOME CARE VISIT (OUTPATIENT)
Dept: SCHEDULING | Facility: HOME HEALTH | Age: 46
End: 2021-01-05
Payer: MEDICARE

## 2021-01-05 VITALS
RESPIRATION RATE: 18 BRPM | OXYGEN SATURATION: 99 % | DIASTOLIC BLOOD PRESSURE: 82 MMHG | TEMPERATURE: 98 F | HEART RATE: 82 BPM | SYSTOLIC BLOOD PRESSURE: 125 MMHG

## 2021-01-05 PROCEDURE — G0300 HHS/HOSPICE OF LPN EA 15 MIN: HCPCS

## 2021-01-07 LAB
ALBUMIN SERPL-MCNC: 3.1 G/DL (ref 3.8–4.8)
ALBUMIN/GLOB SERPL: 1.1 {RATIO} (ref 1.2–2.2)
ALP SERPL-CCNC: 100 IU/L (ref 39–117)
ALT SERPL-CCNC: 8 IU/L (ref 0–32)
AST SERPL-CCNC: 13 IU/L (ref 0–40)
BASOPHILS # BLD AUTO: 0 X10E3/UL (ref 0–0.2)
BASOPHILS NFR BLD AUTO: 0 %
BILIRUB SERPL-MCNC: 0.5 MG/DL (ref 0–1.2)
BUN SERPL-MCNC: 18 MG/DL (ref 6–24)
BUN/CREAT SERPL: 30 (ref 9–23)
CALCIUM SERPL-MCNC: 8.9 MG/DL (ref 8.7–10.2)
CHLORIDE SERPL-SCNC: 100 MMOL/L (ref 96–106)
CO2 SERPL-SCNC: 28 MMOL/L (ref 20–29)
CREAT SERPL-MCNC: 0.61 MG/DL (ref 0.57–1)
EOSINOPHIL # BLD AUTO: 0.2 X10E3/UL (ref 0–0.4)
EOSINOPHIL NFR BLD AUTO: 2 %
ERYTHROCYTE [DISTWIDTH] IN BLOOD BY AUTOMATED COUNT: 12.6 % (ref 11.7–15.4)
GLOBULIN SER CALC-MCNC: 2.9 G/DL (ref 1.5–4.5)
GLUCOSE SERPL-MCNC: 115 MG/DL (ref 65–99)
HCT VFR BLD AUTO: 38.9 % (ref 34–46.6)
HGB BLD-MCNC: 13.3 G/DL (ref 11.1–15.9)
LYMPHOCYTES # BLD AUTO: 3.1 X10E3/UL (ref 0.7–3.1)
LYMPHOCYTES NFR BLD AUTO: 32 %
MAGNESIUM SERPL-MCNC: 2 MG/DL (ref 1.6–2.3)
MCH RBC QN AUTO: 32.9 PG (ref 26.6–33)
MCHC RBC AUTO-ENTMCNC: 34.2 G/DL (ref 31.5–35.7)
MCV RBC AUTO: 96 FL (ref 79–97)
MONOCYTES # BLD AUTO: 1.1 X10E3/UL (ref 0.1–0.9)
MONOCYTES NFR BLD AUTO: 11 %
NEUTROPHILS # BLD AUTO: 5.3 X10E3/UL (ref 1.4–7)
NEUTROPHILS NFR BLD AUTO: 55 %
PHOSPHATE SERPL-MCNC: 3.8 MG/DL (ref 3–4.3)
PLATELET # BLD AUTO: 251 X10E3/UL (ref 150–450)
POTASSIUM SERPL-SCNC: 4.2 MMOL/L (ref 3.5–5.2)
PREALB SERPL-MCNC: 33 MG/DL (ref 12–34)
PROT SERPL-MCNC: 6 G/DL (ref 6–8.5)
RBC # BLD AUTO: 4.04 X10E6/UL (ref 3.77–5.28)
SODIUM SERPL-SCNC: 138 MMOL/L (ref 134–144)
TRIGL SERPL-MCNC: 111 MG/DL (ref 0–149)
WBC # BLD AUTO: 9.7 X10E3/UL (ref 3.4–10.8)

## 2021-01-08 ENCOUNTER — TELEPHONE (OUTPATIENT)
Dept: FAMILY MEDICINE CLINIC | Age: 46
End: 2021-01-08

## 2021-01-08 DIAGNOSIS — F41.9 ANXIETY: ICD-10-CM

## 2021-01-08 NOTE — TELEPHONE ENCOUNTER
Patient Clonazepam .5mg patient needs a prescription written by SHARDA since her old one was written by Candy Garcia.

## 2021-01-11 ENCOUNTER — HOME CARE VISIT (OUTPATIENT)
Dept: SCHEDULING | Facility: HOME HEALTH | Age: 46
End: 2021-01-11
Payer: MEDICARE

## 2021-01-11 VITALS
OXYGEN SATURATION: 99 % | DIASTOLIC BLOOD PRESSURE: 57 MMHG | RESPIRATION RATE: 18 BRPM | TEMPERATURE: 98 F | SYSTOLIC BLOOD PRESSURE: 119 MMHG | HEART RATE: 94 BPM

## 2021-01-11 PROCEDURE — G0300 HHS/HOSPICE OF LPN EA 15 MIN: HCPCS

## 2021-01-11 RX ORDER — CLONAZEPAM 0.5 MG/1
0.5 TABLET ORAL
Qty: 60 TAB | Refills: 0 | Status: SHIPPED | OUTPATIENT
Start: 2021-01-11 | End: 2021-07-16

## 2021-01-12 NOTE — TELEPHONE ENCOUNTER
Patient requesting clonazepam.  Uses as needed for anxiety. Reviewed PDMP has not received prescriptions since March, which is about 10 months ago. Order placed. Has morphine, and has Narcan as well.

## 2021-01-22 ENCOUNTER — TELEPHONE (OUTPATIENT)
Dept: SURGERY | Age: 46
End: 2021-01-22

## 2021-01-22 ENCOUNTER — HOME CARE VISIT (OUTPATIENT)
Dept: SCHEDULING | Facility: HOME HEALTH | Age: 46
End: 2021-01-22
Payer: MEDICARE

## 2021-01-22 PROCEDURE — G0299 HHS/HOSPICE OF RN EA 15 MIN: HCPCS

## 2021-01-22 PROCEDURE — 400014 HH F/U

## 2021-01-22 NOTE — TELEPHONE ENCOUNTER
I returned patients call and verified patient with 2 patient identifiers. I told her info had been faxed to [employer] on 12/1/2020 and confirmation was received. Denis faxed it again today. She said she will call [employer] and ask them to fax requesting info to us again (if different from what they are receiving today) and I will have Dr. Michael Rasmussen sign it and fax it back. She gave me fax# [phone]. She said this was the number on an email she had received from them. Of note patient states she is trying to make an appt. With Dr. Miah Ramey at [facility] and Dr. Magy Stuart colon rectal surgeon at [facility] but to date they have not returned her calls. Her last appt. With dr. Jerzy Shanks was 8/13/2019. She is currently seeing  GI,  Urologist and PCP.

## 2021-01-24 VITALS
HEART RATE: 95 BPM | DIASTOLIC BLOOD PRESSURE: 76 MMHG | OXYGEN SATURATION: 99 % | SYSTOLIC BLOOD PRESSURE: 116 MMHG | TEMPERATURE: 97.8 F | RESPIRATION RATE: 18 BRPM

## 2021-01-27 ENCOUNTER — HOME CARE VISIT (OUTPATIENT)
Dept: SCHEDULING | Facility: HOME HEALTH | Age: 46
End: 2021-01-27
Payer: MEDICARE

## 2021-01-27 PROCEDURE — A6250 SKIN SEAL PROTECT MOISTURIZR: HCPCS

## 2021-01-27 PROCEDURE — G0300 HHS/HOSPICE OF LPN EA 15 MIN: HCPCS

## 2021-01-28 VITALS
OXYGEN SATURATION: 99 % | DIASTOLIC BLOOD PRESSURE: 67 MMHG | TEMPERATURE: 97.4 F | HEART RATE: 72 BPM | RESPIRATION RATE: 18 BRPM | SYSTOLIC BLOOD PRESSURE: 121 MMHG

## 2021-02-04 ENCOUNTER — TELEPHONE (OUTPATIENT)
Dept: FAMILY MEDICINE CLINIC | Age: 46
End: 2021-02-04

## 2021-02-04 ENCOUNTER — HOME CARE VISIT (OUTPATIENT)
Dept: SCHEDULING | Facility: HOME HEALTH | Age: 46
End: 2021-02-04
Payer: MEDICARE

## 2021-02-04 PROCEDURE — G0300 HHS/HOSPICE OF LPN EA 15 MIN: HCPCS

## 2021-02-04 RX ORDER — NYSTATIN 100000 [USP'U]/G
POWDER TOPICAL
Qty: 60 G | Refills: 3 | Status: SHIPPED | OUTPATIENT
Start: 2021-02-04 | End: 2022-03-04

## 2021-02-04 NOTE — TELEPHONE ENCOUNTER
Pt needs a new RX for mystatin powder apply 1 units to affected area daily.  Please send to [facility]

## 2021-02-07 LAB
ALBUMIN SERPL-MCNC: 2.8 G/DL (ref 3.8–4.8)
ALBUMIN/GLOB SERPL: 1 {RATIO} (ref 1.2–2.2)
ALP SERPL-CCNC: 89 IU/L (ref 39–117)
ALT SERPL-CCNC: 7 IU/L (ref 0–32)
AST SERPL-CCNC: 13 IU/L (ref 0–40)
BACTERIA UR CULT: NORMAL
BASOPHILS # BLD AUTO: 0 X10E3/UL (ref 0–0.2)
BASOPHILS NFR BLD AUTO: 0 %
BILIRUB SERPL-MCNC: 0.7 MG/DL (ref 0–1.2)
BUN SERPL-MCNC: 10 MG/DL (ref 6–24)
BUN/CREAT SERPL: 17 (ref 9–23)
CALCIUM SERPL-MCNC: 8.8 MG/DL (ref 8.7–10.2)
CHLORIDE SERPL-SCNC: 99 MMOL/L (ref 96–106)
CO2 SERPL-SCNC: 28 MMOL/L (ref 20–29)
CREAT SERPL-MCNC: 0.6 MG/DL (ref 0.57–1)
EOSINOPHIL # BLD AUTO: 0.3 X10E3/UL (ref 0–0.4)
EOSINOPHIL NFR BLD AUTO: 3 %
ERYTHROCYTE [DISTWIDTH] IN BLOOD BY AUTOMATED COUNT: 12.8 % (ref 11.7–15.4)
GLOBULIN SER CALC-MCNC: 2.9 G/DL (ref 1.5–4.5)
GLUCOSE SERPL-MCNC: 127 MG/DL (ref 65–99)
HCT VFR BLD AUTO: 34.9 % (ref 34–46.6)
HGB BLD-MCNC: 12.1 G/DL (ref 11.1–15.9)
INR PPP: 1 (ref 0.9–1.2)
LYMPHOCYTES # BLD AUTO: 3 X10E3/UL (ref 0.7–3.1)
LYMPHOCYTES NFR BLD AUTO: 28 %
MAGNESIUM SERPL-MCNC: 1.2 MG/DL (ref 1.6–2.3)
MCH RBC QN AUTO: 33 PG (ref 26.6–33)
MCHC RBC AUTO-ENTMCNC: 34.7 G/DL (ref 31.5–35.7)
MCV RBC AUTO: 95 FL (ref 79–97)
MONOCYTES # BLD AUTO: 0.9 X10E3/UL (ref 0.1–0.9)
MONOCYTES NFR BLD AUTO: 9 %
NEUTROPHILS # BLD AUTO: 6.3 X10E3/UL (ref 1.4–7)
NEUTROPHILS NFR BLD AUTO: 60 %
PHOSPHATE SERPL-MCNC: 4.2 MG/DL (ref 3–4.3)
PLATELET # BLD AUTO: 250 X10E3/UL (ref 150–450)
POTASSIUM SERPL-SCNC: 3.9 MMOL/L (ref 3.5–5.2)
PREALB SERPL-MCNC: 30 MG/DL (ref 12–34)
PROT SERPL-MCNC: 5.7 G/DL (ref 6–8.5)
PROTHROMBIN TIME: 10.4 SEC (ref 9.1–12)
RBC # BLD AUTO: 3.67 X10E6/UL (ref 3.77–5.28)
SODIUM SERPL-SCNC: 138 MMOL/L (ref 134–144)
TRIGL SERPL-MCNC: 135 MG/DL (ref 0–149)
WBC # BLD AUTO: 10.5 X10E3/UL (ref 3.4–10.8)

## 2021-02-09 VITALS
OXYGEN SATURATION: 99 % | DIASTOLIC BLOOD PRESSURE: 78 MMHG | HEART RATE: 83 BPM | RESPIRATION RATE: 18 BRPM | SYSTOLIC BLOOD PRESSURE: 126 MMHG | TEMPERATURE: 98 F

## 2021-02-10 ENCOUNTER — HOME CARE VISIT (OUTPATIENT)
Dept: SCHEDULING | Facility: HOME HEALTH | Age: 46
End: 2021-02-10
Payer: MEDICARE

## 2021-02-10 VITALS
TEMPERATURE: 98.2 F | RESPIRATION RATE: 18 BRPM | HEART RATE: 82 BPM | OXYGEN SATURATION: 98 % | SYSTOLIC BLOOD PRESSURE: 97 MMHG | DIASTOLIC BLOOD PRESSURE: 62 MMHG

## 2021-02-10 PROCEDURE — G0299 HHS/HOSPICE OF RN EA 15 MIN: HCPCS

## 2021-02-17 ENCOUNTER — HOME CARE VISIT (OUTPATIENT)
Dept: SCHEDULING | Facility: HOME HEALTH | Age: 46
End: 2021-02-17
Payer: MEDICARE

## 2021-02-17 PROCEDURE — G0300 HHS/HOSPICE OF LPN EA 15 MIN: HCPCS

## 2021-02-17 PROCEDURE — A6250 SKIN SEAL PROTECT MOISTURIZR: HCPCS

## 2021-02-17 PROCEDURE — 400014 HH F/U

## 2021-02-18 VITALS
RESPIRATION RATE: 18 BRPM | SYSTOLIC BLOOD PRESSURE: 116 MMHG | TEMPERATURE: 97.8 F | OXYGEN SATURATION: 99 % | HEART RATE: 94 BPM | DIASTOLIC BLOOD PRESSURE: 76 MMHG

## 2021-02-23 ENCOUNTER — HOME CARE VISIT (OUTPATIENT)
Dept: SCHEDULING | Facility: HOME HEALTH | Age: 46
End: 2021-02-23
Payer: MEDICARE

## 2021-02-23 PROCEDURE — G0300 HHS/HOSPICE OF LPN EA 15 MIN: HCPCS

## 2021-02-27 NOTE — PROGRESS NOTES
Problem: Falls - Risk of  Goal: *Absence of Falls  Document Marlene Fall Risk and appropriate interventions in the flowsheet.    Outcome: Progressing Towards Goal  Fall Risk Interventions:  Mobility Interventions: Patient to call before getting OOB         Medication Interventions: Teach patient to arise slowly    Elimination Interventions: Call light in reach    History of Falls Interventions: Door open when patient unattended No

## 2021-02-28 VITALS
HEART RATE: 97 BPM | RESPIRATION RATE: 18 BRPM | OXYGEN SATURATION: 99 % | DIASTOLIC BLOOD PRESSURE: 78 MMHG | TEMPERATURE: 97.9 F | SYSTOLIC BLOOD PRESSURE: 117 MMHG

## 2021-03-01 ENCOUNTER — HOME CARE VISIT (OUTPATIENT)
Dept: SCHEDULING | Facility: HOME HEALTH | Age: 46
End: 2021-03-01
Payer: MEDICARE

## 2021-03-01 VITALS
DIASTOLIC BLOOD PRESSURE: 87 MMHG | TEMPERATURE: 97.9 F | SYSTOLIC BLOOD PRESSURE: 129 MMHG | RESPIRATION RATE: 18 BRPM | OXYGEN SATURATION: 98 % | HEART RATE: 70 BPM

## 2021-03-01 PROCEDURE — G0300 HHS/HOSPICE OF LPN EA 15 MIN: HCPCS

## 2021-03-09 ENCOUNTER — HOME CARE VISIT (OUTPATIENT)
Dept: SCHEDULING | Facility: HOME HEALTH | Age: 46
End: 2021-03-09
Payer: MEDICARE

## 2021-03-09 PROCEDURE — G0300 HHS/HOSPICE OF LPN EA 15 MIN: HCPCS

## 2021-03-10 PROCEDURE — A6250 SKIN SEAL PROTECT MOISTURIZR: HCPCS

## 2021-03-12 ENCOUNTER — TELEPHONE (OUTPATIENT)
Dept: FAMILY MEDICINE CLINIC | Age: 46
End: 2021-03-12

## 2021-03-12 DIAGNOSIS — G89.29 OTHER CHRONIC PAIN: Primary | ICD-10-CM

## 2021-03-12 NOTE — TELEPHONE ENCOUNTER
Patient wants a referral to pain management. She has to find a different doctor for the pain as her prior dr is closing. She wants this done before her time frame runs out.

## 2021-03-13 VITALS
TEMPERATURE: 98.1 F | OXYGEN SATURATION: 99 % | SYSTOLIC BLOOD PRESSURE: 107 MMHG | RESPIRATION RATE: 18 BRPM | HEART RATE: 76 BPM | DIASTOLIC BLOOD PRESSURE: 72 MMHG

## 2021-03-17 ENCOUNTER — HOME CARE VISIT (OUTPATIENT)
Dept: SCHEDULING | Facility: HOME HEALTH | Age: 46
End: 2021-03-17
Payer: MEDICARE

## 2021-03-17 ENCOUNTER — TELEPHONE (OUTPATIENT)
Dept: FAMILY MEDICINE CLINIC | Age: 46
End: 2021-03-17

## 2021-03-17 PROCEDURE — G0300 HHS/HOSPICE OF LPN EA 15 MIN: HCPCS

## 2021-03-17 PROCEDURE — 400014 HH F/U

## 2021-03-17 NOTE — TELEPHONE ENCOUNTER
Pt phoned stating that she needs a referral to a nutrutionist and dietician per her [facility] specialist, Dr. Kristine Darby. Pt was advised to get the referral from her local PCP.  Please advise

## 2021-03-20 VITALS
OXYGEN SATURATION: 99 % | SYSTOLIC BLOOD PRESSURE: 114 MMHG | RESPIRATION RATE: 18 BRPM | HEART RATE: 82 BPM | DIASTOLIC BLOOD PRESSURE: 78 MMHG | TEMPERATURE: 97.9 F

## 2021-03-21 NOTE — TELEPHONE ENCOUNTER
Please ask her what happened to the nutrition she previously had who was helping her with her feeds.

## 2021-03-23 ENCOUNTER — HOME CARE VISIT (OUTPATIENT)
Dept: SCHEDULING | Facility: HOME HEALTH | Age: 46
End: 2021-03-23
Payer: MEDICARE

## 2021-03-23 PROCEDURE — G0300 HHS/HOSPICE OF LPN EA 15 MIN: HCPCS

## 2021-03-23 NOTE — TELEPHONE ENCOUNTER
Patient states they want her to see a nutritionist/dieticiian she cant remember which one. . She states that its in her last notes from he last visit.

## 2021-03-24 NOTE — TELEPHONE ENCOUNTER
Hi can you please call Central Scheduling, [phone] . To  see what dietitians can see this patient. It will be  for weight loss as she needs to lose weight for her upcoming surgery. Her colorectal surgeon is requesting this.

## 2021-03-26 NOTE — TELEPHONE ENCOUNTER
Called central scheduling, they're having system issues.  Unable to make contact or process; will contact later

## 2021-03-28 VITALS
SYSTOLIC BLOOD PRESSURE: 127 MMHG | OXYGEN SATURATION: 99 % | TEMPERATURE: 98.9 F | RESPIRATION RATE: 18 BRPM | DIASTOLIC BLOOD PRESSURE: 85 MMHG | HEART RATE: 83 BPM

## 2021-03-30 NOTE — TELEPHONE ENCOUNTER
Spoke to Centralized Scheduling they gave me Magui Gordillo [facility]), Marychuy Figueroa [facility]) and Dr. Catalina Hamman ([place] [id number])

## 2021-04-01 DIAGNOSIS — Z71.3 WEIGHT LOSS COUNSELING, ENCOUNTER FOR: Primary | ICD-10-CM

## 2021-04-01 DIAGNOSIS — E66.01 CLASS 3 SEVERE OBESITY DUE TO EXCESS CALORIES WITH SERIOUS COMORBIDITY AND BODY MASS INDEX (BMI) OF 40.0 TO 44.9 IN ADULT (HCC): ICD-10-CM

## 2021-04-01 PROCEDURE — A6250 SKIN SEAL PROTECT MOISTURIZR: HCPCS

## 2021-04-02 ENCOUNTER — HOME CARE VISIT (OUTPATIENT)
Dept: SCHEDULING | Facility: HOME HEALTH | Age: 46
End: 2021-04-02
Payer: MEDICARE

## 2021-04-02 PROCEDURE — G0300 HHS/HOSPICE OF LPN EA 15 MIN: HCPCS

## 2021-04-07 VITALS
DIASTOLIC BLOOD PRESSURE: 76 MMHG | OXYGEN SATURATION: 99 % | TEMPERATURE: 97.6 F | SYSTOLIC BLOOD PRESSURE: 121 MMHG | HEART RATE: 80 BPM | RESPIRATION RATE: 18 BRPM

## 2021-04-08 ENCOUNTER — HOME CARE VISIT (OUTPATIENT)
Dept: SCHEDULING | Facility: HOME HEALTH | Age: 46
End: 2021-04-08
Payer: MEDICARE

## 2021-04-08 PROCEDURE — G0300 HHS/HOSPICE OF LPN EA 15 MIN: HCPCS

## 2021-04-13 ENCOUNTER — TELEPHONE (OUTPATIENT)
Dept: CARDIAC REHAB | Age: 46
End: 2021-04-13

## 2021-04-14 VITALS
RESPIRATION RATE: 18 BRPM | HEART RATE: 83 BPM | SYSTOLIC BLOOD PRESSURE: 117 MMHG | OXYGEN SATURATION: 99 % | TEMPERATURE: 98.9 F | DIASTOLIC BLOOD PRESSURE: 80 MMHG

## 2021-04-15 ENCOUNTER — HOME CARE VISIT (OUTPATIENT)
Dept: SCHEDULING | Facility: HOME HEALTH | Age: 46
End: 2021-04-15
Payer: MEDICARE

## 2021-04-15 VITALS
DIASTOLIC BLOOD PRESSURE: 64 MMHG | OXYGEN SATURATION: 99 % | HEART RATE: 70 BPM | SYSTOLIC BLOOD PRESSURE: 91 MMHG | TEMPERATURE: 97.5 F | RESPIRATION RATE: 18 BRPM

## 2021-04-15 PROCEDURE — G0299 HHS/HOSPICE OF RN EA 15 MIN: HCPCS

## 2021-04-21 NOTE — PROGRESS NOTES
PATIENT IS CALLING TODAY IN REGARDS TO THE FOLLOW UP SHE NEEDS TO SCHEDULE AND HER LEVOTHYROXINE 75MCG.    PATIENT SCHEDULED THE EARLIEST APPT AVAILABLE WITH DANTE MYERS BECAUSE DR SEVERINO IS SCHEDULED OUT EVEN FARTHER. PATIENT STATED [facility] RECEIVED THE PRESCRIPTION FOR LEVOTHYROXINE ON 4-12-21 BUT IS REFUSING THE MAIL OUT MEDICATION TO PATIENT BUT WON'T TELL HER WHY. PATIENT HAS BEEN OUT OF MEDICATION FOR A MONTH.    PATIENT IS UPSET BECAUSE SHE SAID SHE HAD A CONVERSATION WITH DR SEVERINO LAST APPT STATING THAT THE PATIENT COULD WAIT UNTIL April 2021 TO COME BACK IN FOR A FOLLOW UP DUE TO THE PATIENT BEING CONCERNED ABOUT THE PANDEMIC.     PATIENT NEEDS THAT MEDICATION AS SOON AS POSSIBLE. SHE HAS SCHEDULED A FOLLOW UP APPT AS REQUESTED. PLEASE ADVISE.    CALL BACK: [phone]   Report to Rolando Lopez RN, SBAR, Kardex, result, and course to date reviewed. Opportunity for questions to be asked/answered. Care assumed by Rolando Lopez RN.

## 2021-04-23 ENCOUNTER — HOME CARE VISIT (OUTPATIENT)
Dept: SCHEDULING | Facility: HOME HEALTH | Age: 46
End: 2021-04-23
Payer: MEDICARE

## 2021-04-23 PROCEDURE — 400014 HH F/U

## 2021-04-23 PROCEDURE — G0300 HHS/HOSPICE OF LPN EA 15 MIN: HCPCS

## 2021-04-24 VITALS
TEMPERATURE: 97.3 F | OXYGEN SATURATION: 99 % | RESPIRATION RATE: 18 BRPM | SYSTOLIC BLOOD PRESSURE: 115 MMHG | HEART RATE: 86 BPM | DIASTOLIC BLOOD PRESSURE: 74 MMHG

## 2021-04-27 ENCOUNTER — HOME CARE VISIT (OUTPATIENT)
Dept: SCHEDULING | Facility: HOME HEALTH | Age: 46
End: 2021-04-27
Payer: MEDICARE

## 2021-04-27 PROCEDURE — G0300 HHS/HOSPICE OF LPN EA 15 MIN: HCPCS

## 2021-04-29 NOTE — PROGRESS NOTES
Day #8 of Vancomycin  Indication:  Post surgical site infection/SSTI   Current regimen:  1.75g IV q36h  Abx regimen:  Eraxis + Zosyn + Vancomycin    Recent Labs      17   0033  17   0423  17   0433   WBC  21.6*  26.0*  27.2*   CREA  1.34*  1.51*  1.77*   BUN  16  12  10     Est CrCl: ~80-85 ml/min; UO: ~0.6 ml/kg/hr  Temp (24hrs), Av.8 °F (37.1 °C), Min:98.2 °F (36.8 °C), Max:99.2 °F (37.3 °C)    Cultures:   Abscess cx: light MRSA (Vanc JOY = 2), final   Blood: NGTD   Incisional wound: MRSA (in thio broth only), pending    Goal trough = 10 - 15 mcg/mL    Recent trough history (date/time/level/dose/action taken):   @ 0921 = 22.9 mcg/ml (drawn ~13.5 post-dose)   @ 2019 = 21 mcg/ml (drawn ~48 hrs post-dose)   @0033 = 7.8 mcg/mL (~47 hours post dose)    Plan: Scr trending down and UO is stable. Will adjust dose further to 1750 mg IV q24h, dosing per P&T approved protocol. Dakota Burgos, Pharm. D., BCPS independent

## 2021-04-30 VITALS
SYSTOLIC BLOOD PRESSURE: 113 MMHG | TEMPERATURE: 97.8 F | OXYGEN SATURATION: 99 % | HEART RATE: 86 BPM | RESPIRATION RATE: 18 BRPM | DIASTOLIC BLOOD PRESSURE: 83 MMHG

## 2021-04-30 PROCEDURE — A6250 SKIN SEAL PROTECT MOISTURIZR: HCPCS

## 2021-05-06 ENCOUNTER — HOME CARE VISIT (OUTPATIENT)
Dept: SCHEDULING | Facility: HOME HEALTH | Age: 46
End: 2021-05-06
Payer: MEDICARE

## 2021-05-06 PROCEDURE — G0300 HHS/HOSPICE OF LPN EA 15 MIN: HCPCS

## 2021-05-07 ENCOUNTER — HOSPITAL ENCOUNTER (OUTPATIENT)
Dept: CARDIAC REHAB | Age: 46
Discharge: HOME | End: 2021-05-07
Payer: MEDICARE

## 2021-05-07 VITALS — HEIGHT: 64 IN | WEIGHT: 267.6 LBS | BODY MASS INDEX: 45.68 KG/M2

## 2021-05-07 LAB
ALBUMIN SERPL-MCNC: 3.4 G/DL (ref 3.8–4.8)
ALBUMIN/GLOB SERPL: 1.3 {RATIO} (ref 1.2–2.2)
ALP SERPL-CCNC: 120 IU/L (ref 39–117)
ALT SERPL-CCNC: 6 IU/L (ref 0–32)
AST SERPL-CCNC: 13 IU/L (ref 0–40)
BASOPHILS # BLD AUTO: 0.1 X10E3/UL (ref 0–0.2)
BASOPHILS NFR BLD AUTO: 1 %
BILIRUB SERPL-MCNC: 0.4 MG/DL (ref 0–1.2)
BUN SERPL-MCNC: 14 MG/DL (ref 6–24)
BUN/CREAT SERPL: 23 (ref 9–23)
CALCIUM SERPL-MCNC: 8.9 MG/DL (ref 8.7–10.2)
CHLORIDE SERPL-SCNC: 98 MMOL/L (ref 96–106)
CO2 SERPL-SCNC: 25 MMOL/L (ref 20–29)
CREAT SERPL-MCNC: 0.61 MG/DL (ref 0.57–1)
EOSINOPHIL # BLD AUTO: 0.3 X10E3/UL (ref 0–0.4)
EOSINOPHIL NFR BLD AUTO: 3 %
ERYTHROCYTE [DISTWIDTH] IN BLOOD BY AUTOMATED COUNT: 13.2 % (ref 11.7–15.4)
GLOBULIN SER CALC-MCNC: 2.7 G/DL (ref 1.5–4.5)
GLUCOSE SERPL-MCNC: 207 MG/DL (ref 65–99)
HCT VFR BLD AUTO: 38.1 % (ref 34–46.6)
HGB BLD-MCNC: 12.7 G/DL (ref 11.1–15.9)
IMM GRANULOCYTES # BLD AUTO: 0.1 X10E3/UL (ref 0–0.1)
IMM GRANULOCYTES NFR BLD AUTO: 1 %
LYMPHOCYTES # BLD AUTO: 3.3 X10E3/UL (ref 0.7–3.1)
LYMPHOCYTES NFR BLD AUTO: 33 %
MAGNESIUM SERPL-MCNC: 1.7 MG/DL (ref 1.6–2.3)
MCH RBC QN AUTO: 32.8 PG (ref 26.6–33)
MCHC RBC AUTO-ENTMCNC: 33.3 G/DL (ref 31.5–35.7)
MCV RBC AUTO: 98 FL (ref 79–97)
MONOCYTES # BLD AUTO: 0.7 X10E3/UL (ref 0.1–0.9)
MONOCYTES NFR BLD AUTO: 7 %
NEUTROPHILS # BLD AUTO: 5.6 X10E3/UL (ref 1.4–7)
NEUTROPHILS NFR BLD AUTO: 55 %
PHOSPHATE SERPL-MCNC: 3.3 MG/DL (ref 3–4.3)
PLATELET # BLD AUTO: 319 X10E3/UL (ref 150–450)
POTASSIUM SERPL-SCNC: 4.2 MMOL/L (ref 3.5–5.2)
PREALB SERPL-MCNC: 35 MG/DL (ref 12–34)
PROT SERPL-MCNC: 6.1 G/DL (ref 6–8.5)
RBC # BLD AUTO: 3.87 X10E6/UL (ref 3.77–5.28)
SODIUM SERPL-SCNC: 140 MMOL/L (ref 134–144)
TRIGL SERPL-MCNC: 137 MG/DL (ref 0–149)
WBC # BLD AUTO: 9.9 X10E3/UL (ref 3.4–10.8)

## 2021-05-07 PROCEDURE — 97802 MEDICAL NUTRITION INDIV IN: CPT | Performed by: DIETITIAN, REGISTERED

## 2021-05-07 NOTE — PROGRESS NOTES
[address] NOTE  DATE: 2021      REFERRING PHYSICIAN: Beverly Nelson    NAME: Katrina Summers : 1975 AGE: 39 y.o. 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 She has a complicated surgical history, 1/3 of her stomach has been removed and she has a permanent ileostomy and now a fistula. The surgeon wants her to lose some weight prior to surgery, though no set weight goal was mandated. LABS:   Lab Results   Component Value Date/Time    Cholesterol, total 204 (H) 2020 02:50 PM    HDL Cholesterol 58 2020 02:50 PM    LDL, calculated 110 (H) 2020 02:50 PM    VLDL, calculated 36 2020 02:50 PM    Triglyceride 137 2021 02:20 AM     Lab Results   Component Value Date/Time    Hemoglobin A1c 5.1 2020 02:50 PM     SMBG once a day; partial blockage caused elevated BGs; FBG today 77    MEDICATIONS/SUPPLEMENTS:   [unfilled]  Prior to Admission medications    Medication Sig Start Date End Date Taking? Authorizing Provider   nystatin (MYCOSTATIN) powder Apply to the affected areas (peristomal skin) 2 to 3 times daily  Patient taking differently: Apply 100,000 Units to affected area three (3) times daily. Apply . 5 -1 g to the affected areas (peristomal skin) 2 to 3 times daily  Indications: a skin infection due to the fungus Candida 21 Shelley Olson MD   clonazePAM (KlonoPIN) 0.5 mg tablet Take 1 Tab by mouth two (2) times daily as needed for Anxiety. Max Daily Amount: 1 mg. Indications: anxiety 1/11/21   Shelley Olson MD   cranberry fruit extract (CRANBERRY PO) Take 10,000 Units by mouth daily. TAKE 1 TAB BY MOUTH DAILY    Provider, Historical   biotin 1 mg cap Take 1,000 mcg by mouth daily. TAKE 1 TAB BY MOUTH DAILY    Provider, Historical   Humira,CF, Pen 40 mg/0.4 mL injection pen 40 mg by SubCUTAneous route every seven (7) days. per subsequtaneous injection Once a week  10/20/20   Provider, Historical   naloxone (Narcan) 4 mg/actuation nasal spray Narcan 4 mg/actuation nasal spray (spray 1 actuation via nasal for opiod overdose)  12/17/20   Provider, Historical   metroNIDAZOLE (FlagyL) 500 mg tablet Take 500 mg by mouth three (3) times daily. 9/30/20   Santy Amador MD   ciprofloxacin HCl (Cipro) 500 mg tablet Take 500 mg by mouth two (2) times a day. 9/30/20   Santy Amador MD   predniSONE (DELTASONE) 5 mg tablet Take 5 mg by mouth nightly. Provider, Historical   ondansetron (ZOFRAN ODT) 8 mg disintegrating tablet Take 8 mg by mouth every eight (8) hours as needed for Nausea. Provider, Historical   potassium chloride SR (KLOR-CON 10) 10 mEq tablet Take 10 mEq by mouth daily. 4/13/20   Milburn Lefort, MD   metFORMIN (GLUCOPHAGE) 500 mg tablet Take 500 mg by mouth two (2) times daily (with meals). 3/6/20   Provider, Historical   VASCULAR CATHETER: MLC AND PICC FLUSH PANEL 10 mL by IntraVENous route daily. Provider, Historical   heparin sodium,porcine (HEPARIN, PORCINE, IV) 3 mL by IntraVENous route daily. Provider, Historical   cholecalciferol (VITAMIN D3) (1000 Units /25 mcg) tablet Take 5,000 Units by mouth daily. Provider, Historical   FOLIC ACID PO Take 1 mg by mouth daily. TAKE 1 TAB BY MOUTH DAILY    Provider, Historical   furosemide (LASIX) 20 mg tablet Take 20 mg by mouth daily.  TAKE AS NEED FOR FLUID RETENTION OF 3 POUNDS OR MORE UNTIL RETURN TO BASELINE WEIGHT    Provider, Historical   insulin NPH (HUMULIN N) 100 unit/mL (3 mL) inpn 35 Units by SubCUTAneous route every morning. Provider, Historical   insulin NPH (HUMULIN N) 100 unit/mL (3 mL) inpn 40 Units by SubCUTAneous route every evening. Provider, Historical   pyridoxine, vitamin B6, (VITAMIN B-6) 50 mg tablet Take 50 mg by mouth daily. Provider, Historical   acetaminophen-caffeine 500-65 mg (EXCEDRINE TENSION HEADACHE) 500-65 mg tab Take 1 Tab by mouth every eight (8) hours as needed for Headache. Provider, Historical   azaTHIOprine (IMURAN) 50 mg tablet Take 150 mg by mouth daily. Provider, Historical   omega 3-DHA-EPA-fish oil (FISH OIL) 1,000 mg (120 mg-180 mg) capsule Take 1 Cap by mouth daily. Provider, Historical   mv-min-C-glutamin-lysine-hb124 (IMMUNE SUPPORT) 250-12.5 mg chew Take 1 Tab by mouth daily. Provider, Historical   aspirin (ASPIRIN) 325 mg tablet Take 325 mg by mouth daily. 8/15/19   Daphne Shay MD   TPN ADULT - CENTRAL 2,000 mL by IntraVENous route daily. over 12 hours. Taper infusion rate up 1 hr/down1 hr    Justa Urban MD   morphine CR (MS CONTIN) 30 mg CR tablet Take 30 mg by mouth every eight (8) hours. UP TO 3X DAILY 4/7/21   Daphne Shay MD   morphine CR (MS CONTIN) 60 mg CR tablet Take 60 mg by mouth every twelve (12) hours. 1/5/21   Daphne Shay MD   omeprazole (PRILOSEC) 20 mg capsule Take 20 mg by mouth daily. 8/13/18   Daphne Shay MD   heparin sod,porcine/0.9 % NaCl (HEPARIN FLUSH IV) 3 mL by IntraVENous Push route daily. after TPN and post lab draw    Provider, Historical   sodium chloride (NORMAL SALINE FLUSH) 10-20 mL by IntraVENous Push route daily. before and after TPN and/or labs    Provider, Historical   dronabinol (MARINOL) 5 mg capsule Take 1 Cap by mouth two (2) times daily as needed (appetite). Max Daily Amount: 10 mg.   Patient taking differently: Take 1 Cap by mouth every six (6) hours as needed for Nausea. 6/12/18   Bishnu Kelly MD   ferrous sulfate 325 mg (65 mg iron) tablet Take 1 Tab by mouth Daily (before breakfast). 6/12/18   Bishnu Kelly MD   ascorbic acid, vitamin C, (VITAMIN C) 250 mg tablet Take 250 mg by mouth two (2) times a day. Provider, Historical   promethazine (PHENERGAN) 25 mg tablet Take 25 mg by mouth every six (6) hours as needed for Nausea. Provider, Historical   VITAMIN B-12 5,000 mcg subl Take 5,000 mcg by mouth every month. 10/30/17   Provider, Historical   insulin lispro (HUMALOG) 100 unit/mL injection As instructed on discharge instructions  Patient taking differently: by SubCUTAneous route two (2) times a day. ~ 2 HRS AFTER START OF TPN CHECK BS: (THIS IS ALSO THE  SS SCHEDULE)  SS:  2 UNITS -249  3 UNITS -299  4 UNITS -349  IF GREATER THAN 350 CALL MD      ~1 HR AFTER TPN COMPLETION CHECK BS:  SS:  2 UNITS -199  3 UNITS -249  5 UNITS -299  7 UNITS -349  CALL MD FOR GREATER THAN 350 10/27/17   Meryle Octave, MD     Per patient no longer taking cranberry extract, taking total of 7.5 mg prednisone, no longer taking metformin or insulin due to no diabetes, no fish oil, all minerals & vitamins combined into a multi except for Vit D, magnesium, iron every other day    She is receiving TPN twice a week, primarily electrolytes but also includes 100 g amino acids and 80 g dextrose and provides 672 calories. EXERCISE/PHYSICAL ACTIVITY: pt arrives in a wheelchair but is using a rollator to ambulate at home, working on increasing time & distance, yesterday walked a few laps around house and down the driveway to the mailbox and then to the stop sign (about another 15 ft) and back, yesterday walked twice    ALCOHOL / TOBACCO USE: no alcohol; quit smoking 6 years ago    SOCIAL HISTORY: Darleen Kyle lives with her mother Norris Santillan does the grocery shopping - using [employer] mostly. She does receive some home health.     REPORTED WEIGHT HISTORY: [place] reports her highest known weight was 410 lbs 2 years ago and has lost 100 lbs with limited po and malabsorption        ANTHROPOMETRICS:    Ht Readings from Last 1 Encounters:   05/07/21 5' 4\" (1.626 m)      Wt Readings from Last 10 Encounters:   05/07/21 121.4 kg (267 lb 9.6 oz)   11/02/20 114.3 kg (252 lb)   08/03/20 118.8 kg (262 lb)   07/13/20 115.7 kg (255 lb)   11/25/19 122.1 kg (269 lb 2 oz)   10/23/19 125.6 kg (276 lb 12.8 oz)   09/15/19 128.2 kg (282 lb 11.2 oz)   08/13/19 125.4 kg (276 lb 8 oz)   04/22/19 121.6 kg (268 lb)   04/01/19 127.1 kg (280 lb 3.2 oz)      IBW:120 # +/- 10%  %IBW: 223 % +/- 10%    BMI: 45.9 kg/M2  Category: Severe obesity          NUTRITION ASSESSMENT:    FOOD ALLERGIES/INTOLERANCES: no known food allergies    24 HOUR DIET RECALL  Breakfast  3:72 Bagel with slice of cheddar cheese (usually w/ cream cheese instead), 24 oz black cherry Propel, sweet peach tea   Snack     Lunch  2:45 p Leftover spaghetti (meatless) with some mozz cheese & red sauce (1.5- 2 cups)   Snack     Dinner     Snack  10:30 p chobani flip yogurt cookies & cream, 24 oz Propel water     Octavia QUINTANILLA Childphilippe dose not tolerate raw fruits, raw vegetables & seeds due to fistula. Loves sweet tea, drinks gingerale if nauseated. She often has trouble sleeping and sometimes will eat in the middle of the night; has a prior history of comfort / boredom eating but has stopped this. Tries to eat frequently enough to prevent low blood sugars. Some days feels full off of one meal, such as lunch yesterday. Some days does eat more. Tolerates canned chicken, cheese, eggs, creamy peanut butter, yogurt well as protein options. She dislikes fish other than shrimp, crab, lobster. Has never tried hummus. She rarely eats fruits & vegetables even though tolerates cooked veggies she feels bad adding that burden to her mother. Example typical meal is canned chicken and about 2 cups of white rice.   Likes the BTIG smoothies. Note: TPN provides 672 kcal 2x/week     NUTRITION DIAGNOSIS:  Obesity related to food preferences and nutrition related knowledge deficit as evidenced by pt request for RD education and counseling and BMI >40. NUTRITION INTERVENTION:  Nutrition 60 minute one-on-one education & goal setting with Manvinod Torres Form relevant labs compared to ideals.     Reviewed weight history and patient's verbalized weight goal as well as any real or perceived barriers to obtaining the goal. Collaborated with patient to set a specific short and long term weight goal.     Conducted a verbal diet recall and assessed for environmental, financial, psychosocial, physical and comorbidities that may impact the food and eating patterns / behaviors of [address] with patient to set specific nutrient goals as well as specific food / behavior changes that will help patient meet the overall goal of: reduced calories, increased nutrient-density for healthy weight loss    NUTRITION EDUCATION / HANDOUTS PROVIDED:        PATIENT GOALS:    Weight Goals:      Lose weight; no set specific goal at this time    Nutrition Goals:    - replace all caloric beverages with calorie-free options; if drinking a smoothie choose the vegetable/fruit blends vs all fruit (more balanced and lower calories) and have a protein with it to make it a meal  - when choosing salad dressings, cream cheese, butter, yogurts etc choose \"light\" versions; switch to 2% reduced fat cheese  - decrease portions of starches and gradually increase portions of veggies to long range goal of starches 50% of the volume compared to vegetables at each meal  - use Ensure Complete as a liquid meal replacement as needed      Other:    - help prepare own meals; keep chair by microwave in case need to sit  - keep a detailed food diary using the consuelo [employer]  - continue to increase physical activity; get up every 30 minutes and move your body in addition to daily walks        Specific tips and techniques to facilitate compliance with above recommendations were provided and discussed. [id number] verbalized understanding. The patient was encouraged to contact me as needed.       Follow-up: scheduled in 2 weeks              Marck Almodovar RD, [facility] [place]

## 2021-05-09 VITALS
HEART RATE: 73 BPM | SYSTOLIC BLOOD PRESSURE: 110 MMHG | DIASTOLIC BLOOD PRESSURE: 79 MMHG | RESPIRATION RATE: 18 BRPM | TEMPERATURE: 98.7 F | OXYGEN SATURATION: 99 %

## 2021-05-12 ENCOUNTER — HOME CARE VISIT (OUTPATIENT)
Dept: SCHEDULING | Facility: HOME HEALTH | Age: 46
End: 2021-05-12
Payer: MEDICARE

## 2021-05-12 PROCEDURE — G0300 HHS/HOSPICE OF LPN EA 15 MIN: HCPCS

## 2021-05-15 VITALS
DIASTOLIC BLOOD PRESSURE: 72 MMHG | SYSTOLIC BLOOD PRESSURE: 126 MMHG | OXYGEN SATURATION: 99 % | RESPIRATION RATE: 18 BRPM | TEMPERATURE: 98 F | HEART RATE: 82 BPM

## 2021-05-20 ENCOUNTER — HOME CARE VISIT (OUTPATIENT)
Dept: SCHEDULING | Facility: HOME HEALTH | Age: 46
End: 2021-05-20
Payer: MEDICARE

## 2021-05-20 PROCEDURE — A6250 SKIN SEAL PROTECT MOISTURIZR: HCPCS

## 2021-05-20 PROCEDURE — 400014 HH F/U

## 2021-05-20 PROCEDURE — G0300 HHS/HOSPICE OF LPN EA 15 MIN: HCPCS

## 2021-05-21 ENCOUNTER — HOSPITAL ENCOUNTER (OUTPATIENT)
Dept: CARDIAC REHAB | Age: 46
Discharge: HOME | End: 2021-05-21
Payer: MEDICARE

## 2021-05-21 PROCEDURE — 97803 MED NUTRITION INDIV SUBSEQ: CPT | Performed by: DIETITIAN, REGISTERED

## 2021-05-21 NOTE — TELEMEDICINE
Christel Johnson was informed of the inherent limitations of a virtual visit,  and has consented to a virtual therapy visit on 2021. Information regarding emergency contact information for this patient during this visit is to contact:  Gayathri Cline at [phone] in addition to calling 911. The patient was informed that at any time during the virtual visit, they can decide to stop the virtual visit. The patient verified that they are physically located in the [facility] for this virtual visit. ALEJANDRO Arenas  Follow Up Nutrition Counseling  2021    Referring Physician/Surgeon: Dr. Agnes Pritchard  Name: Christel Johnson : 1975 Age: 39 y.o. Gender: female  Reason for visit: Class 3 severe obesity follow-up    ASSESSMENT:  Refer to initial note 21 for full med /surg history & social history; she had a seizure on  and hurt her back and bit her tongue and side of her mouth    Medications/Supplements:   Prior to Admission medications    Medication Sig Start Date End Date Taking? Authorizing Provider   nystatin (MYCOSTATIN) powder Apply to the affected areas (peristomal skin) 2 to 3 times daily  Patient taking differently: Apply 100,000 Units to affected area three (3) times daily. Apply . 5 -1 g to the affected areas (peristomal skin) 2 to 3 times daily  Indications: a skin infection due to the fungus Luisa 21   Sybil Stuart MD   clonazePAM (KlonoPIN) 0.5 mg tablet Take 1 Tab by mouth two (2) times daily as needed for Anxiety. Max Daily Amount: 1 mg. Indications: anxiety 21   Sybil Stuart MD   cranberry fruit extract (CRANBERRY PO) Take 10,000 Units by mouth daily. TAKE 1 TAB BY MOUTH DAILY    Provider, Historical   biotin 1 mg cap Take 1,000 mcg by mouth daily. TAKE 1 TAB BY MOUTH DAILY    Provider, Historical   Humira,CF, Pen 40 mg/0.4 mL injection pen 40 mg by SubCUTAneous route every seven (7) days.  per subsequtaneous injection Once a week  10/20/20   Provider, Historical   naloxone (Narcan) 4 mg/actuation nasal spray Narcan 4 mg/actuation nasal spray (spray 1 actuation via nasal for opiod overdose)  12/17/20   Provider, Historical   metroNIDAZOLE (FlagyL) 500 mg tablet Take 500 mg by mouth three (3) times daily. 9/30/20   Sabas Bobby MD   ciprofloxacin HCl (Cipro) 500 mg tablet Take 500 mg by mouth two (2) times a day. 9/30/20   Sabas Bobby MD   predniSONE (DELTASONE) 5 mg tablet Take 5 mg by mouth nightly. Provider, Historical   ondansetron (ZOFRAN ODT) 8 mg disintegrating tablet Take 8 mg by mouth every eight (8) hours as needed for Nausea. Provider, Historical   potassium chloride SR (KLOR-CON 10) 10 mEq tablet Take 10 mEq by mouth daily. 4/13/20   Claudeen Rota, MD   metFORMIN (GLUCOPHAGE) 500 mg tablet Take 500 mg by mouth two (2) times daily (with meals). 3/6/20   Provider, Historical   VASCULAR CATHETER: MLC AND PICC FLUSH PANEL 10 mL by IntraVENous route daily. Provider, Historical   heparin sodium,porcine (HEPARIN, PORCINE, IV) 3 mL by IntraVENous route daily. Provider, Historical   cholecalciferol (VITAMIN D3) (1000 Units /25 mcg) tablet Take 5,000 Units by mouth daily. Provider, Historical   FOLIC ACID PO Take 1 mg by mouth daily. TAKE 1 TAB BY MOUTH DAILY    Provider, Historical   furosemide (LASIX) 20 mg tablet Take 20 mg by mouth daily. TAKE AS NEED FOR FLUID RETENTION OF 3 POUNDS OR MORE UNTIL RETURN TO BASELINE WEIGHT    Provider, Historical   insulin NPH (HUMULIN N) 100 unit/mL (3 mL) inpn 35 Units by SubCUTAneous route every morning. Provider, Historical   insulin NPH (HUMULIN N) 100 unit/mL (3 mL) inpn 40 Units by SubCUTAneous route every evening. Provider, Historical   pyridoxine, vitamin B6, (VITAMIN B-6) 50 mg tablet Take 50 mg by mouth daily.     Provider, Historical   acetaminophen-caffeine 500-65 mg (EXCEDRINE TENSION HEADACHE) 500-65 mg tab Take 1 Tab by mouth every eight (8) hours as needed for Headache. Provider, Historical   azaTHIOprine (IMURAN) 50 mg tablet Take 150 mg by mouth daily. Provider, Historical   omega 3-DHA-EPA-fish oil (FISH OIL) 1,000 mg (120 mg-180 mg) capsule Take 1 Cap by mouth daily. Provider, Historical   mv-min-C-glutamin-lysine-hb124 (IMMUNE SUPPORT) 250-12.5 mg chew Take 1 Tab by mouth daily. Provider, Historical   aspirin (ASPIRIN) 325 mg tablet Take 325 mg by mouth daily. 8/15/19   Sylvia Blancas MD   TPN ADULT - CENTRAL 2,000 mL by IntraVENous route daily. over 12 hours. Taper infusion rate up 1 hr/down1 hr    Rosalio Urban MD   morphine CR (MS CONTIN) 30 mg CR tablet Take 30 mg by mouth every eight (8) hours. UP TO 3X DAILY 4/7/21   Sylvia Blancas MD   morphine CR (MS CONTIN) 60 mg CR tablet Take 60 mg by mouth every twelve (12) hours. 1/5/21   Sylvia Blancas MD   omeprazole (PRILOSEC) 20 mg capsule Take 20 mg by mouth daily. 8/13/18   Sylvia Blancas MD   heparin sod,porcine/0.9 % NaCl (HEPARIN FLUSH IV) 3 mL by IntraVENous Push route daily. after TPN and post lab draw    Provider, Historical   sodium chloride (NORMAL SALINE FLUSH) 10-20 mL by IntraVENous Push route daily. before and after TPN and/or labs    Provider, Historical   dronabinol (MARINOL) 5 mg capsule Take 1 Cap by mouth two (2) times daily as needed (appetite). Max Daily Amount: 10 mg. Patient taking differently: Take 1 Cap by mouth every six (6) hours as needed for Nausea. 6/12/18   Sylvia Blancas MD   ferrous sulfate 325 mg (65 mg iron) tablet Take 1 Tab by mouth Daily (before breakfast). 6/12/18   Sylvia Blancas MD   ascorbic acid, vitamin C, (VITAMIN C) 250 mg tablet Take 250 mg by mouth two (2) times a day. Provider, Historical   promethazine (PHENERGAN) 25 mg tablet Take 25 mg by mouth every six (6) hours as needed for Nausea. Provider, Historical   VITAMIN B-12 5,000 mcg subl Take 5,000 mcg by mouth every month.  10/30/17   Provider, Historical   insulin lispro (HUMALOG) 100 unit/mL injection As instructed on discharge instructions  Patient taking differently: by SubCUTAneous route two (2) times a day. ~ 2 HRS AFTER START OF TPN CHECK BS: (THIS IS ALSO THE  SS SCHEDULE)  SS:  2 UNITS -249  3 UNITS -299  4 UNITS -349  IF GREATER THAN 350 CALL MD      ~1 HR AFTER TPN COMPLETION CHECK BS:  SS:  2 UNITS -199  3 UNITS -249  5 UNITS -299  7 UNITS -349  CALL MD FOR GREATER THAN 350 10/27/17   Echo Childress MD       Pertinent Labs: Anthropometrics:    Ht Readings from Last 1 Encounters:   05/07/21 5' 4\" (1.626 m)       Wt Readings from Last 10 Encounters:   05/07/21 121.4 kg (267 lb 9.6 oz)   11/02/20 114.3 kg (252 lb)   08/03/20 118.8 kg (262 lb)   07/13/20 115.7 kg (255 lb)   11/25/19 122.1 kg (269 lb 2 oz)   10/23/19 125.6 kg (276 lb 12.8 oz)   09/15/19 128.2 kg (282 lb 11.2 oz)   08/13/19 125.4 kg (276 lb 8 oz)   04/22/19 121.6 kg (268 lb)   04/01/19 127.1 kg (280 lb 3.2 oz)      Reported Wt:     51/8/21 267 lbs There is no height or weight on file to calculate BMI. Category: Severe obesity      Exercise/Physical Actvity: walking to stop sign once a day and trying to move around more until set back from seizure since Tuesday    Estimated Nutritional Needs:  [id number] for weight loss at rate of approx 2 lbs per week ([address] with AF 1.2 and IF 1.2 d/t Crohn's & fistula; minus [phone] for weight loss)    Note: TPN provides 672 calories twice weekly    Reported Diet History: She switched to Light and Fit yogurt and changed to the [employer] green smoothie, purchased reduced fat cream cheese, and switched to Diet Pepsi (had one regular Dr. Ken Mayo the other day and Propel water. She recorded some days on the consuelo [place] but not the past few days. Reports most days under 1500 calories, 1000 at the low end  Limited to choices due to her mouth sores post seizure.   Purchased some frozen cauliflower to replace potatoes, carrots (to be cooked) but has not started yet. 24 Hour Diet Recall  Breakfast  Cheese sandwich (used reduced fat cheese) on white bread (no butter), Propel water   Lunch     Dinner  2 slices cheese pizza, Propel water   Snacks  ? Might have had a string cheese stick   Beverages  Propel water       INITIAL NUTRITION  DIAGNOSIS:  Obesity related to food preferences and nutrition related knowledge deficit as evidenced by pt request for RD education and counseling and BMI>40. NUTRITION INTERVENTION:    30 minute individual telehealth consult with Matthew Wang. Reviewed goals set at last visit and progress made / not made towards goals. Assessed for any real or perceived barriers to obtaining goals. Revised / updated goals. PATIENT GOALS:    Nutrition Goals:  - continue to drink only diet sodas, Propel and unsweetened / diet teas vs sugar-sweetened beverages  - continue to choose \"light\" and \"reduced\" calorie / fat options (though check labels to avoid any sugar-free products containing sugar alcohols)  - use Ensure Complete as a meal replacement as needed  - start goal of including non-starchy vegetables at each meal, with goal of half of meal (e.g. last night would have been 1 slice pizza and carrots vs 2 slices pizza)    Exercise/ Activity Goals:  - continue to increase physical activity; get up every 30 minutes and move your body in addition to daily walks    Other:  - keep a detailed food diary using the consuelo "[employer]pal; suggested daily calorie intake 1600 calories / day but twice a week only 900 [id number] calories from food as TPN will provide 672 calories  - adjust diary settings on the consuelo to allow friends to view; accept friend request from corky      Follow-up scheduled Monday, June 7 at 3:30pm; visit will be by telehealth unless otherwise requested. Specific tips and techniques to facilitate compliance with above recommendations were provided and discussed.   If further details are desired please feel free to contact me at [phone]. This phone number was also provided to the patient for any further questions or concerns. Keara Lamas RD    Bipin Schmidt is a 39 y.o. female being evaluated by a Virtual Visit (video visit) encounter to address concerns as mentioned above. A caregiver was present when appropriate. Due to this being a TeleHealth encounter (During [facility] public health emergency), evaluation of the following areas was limited: Nutrition Focused Physical Exam. Pursuant to the emergency declaration under the [address] [place] [address], [address] [place] [address] authority and the [employer] and [employer] Act, this Virtual Visit was conducted with patient's (and/or legal guardian's) consent, to reduce the risk of exposure to COVID-19 and provide necessary medical care. Services were provided through a video synchronous discussion virtually to substitute for in-person encounter. --Keara Lamas RD on 5/21/2021 at 1:01 PM    An electronic signature was used to authenticate this note.

## 2021-05-22 VITALS
RESPIRATION RATE: 18 BRPM | TEMPERATURE: 97.5 F | DIASTOLIC BLOOD PRESSURE: 80 MMHG | OXYGEN SATURATION: 99 % | SYSTOLIC BLOOD PRESSURE: 125 MMHG | HEART RATE: 69 BPM

## 2021-05-25 ENCOUNTER — HOME CARE VISIT (OUTPATIENT)
Dept: SCHEDULING | Facility: HOME HEALTH | Age: 46
End: 2021-05-25
Payer: MEDICARE

## 2021-05-25 PROCEDURE — G0300 HHS/HOSPICE OF LPN EA 15 MIN: HCPCS

## 2021-05-30 VITALS
RESPIRATION RATE: 18 BRPM | SYSTOLIC BLOOD PRESSURE: 123 MMHG | DIASTOLIC BLOOD PRESSURE: 84 MMHG | HEART RATE: 76 BPM | OXYGEN SATURATION: 99 % | TEMPERATURE: 98 F

## 2021-06-02 ENCOUNTER — HOME CARE VISIT (OUTPATIENT)
Dept: SCHEDULING | Facility: HOME HEALTH | Age: 46
End: 2021-06-02
Payer: MEDICARE

## 2021-06-02 PROCEDURE — G0300 HHS/HOSPICE OF LPN EA 15 MIN: HCPCS

## 2021-06-05 VITALS
OXYGEN SATURATION: 99 % | SYSTOLIC BLOOD PRESSURE: 120 MMHG | RESPIRATION RATE: 18 BRPM | DIASTOLIC BLOOD PRESSURE: 67 MMHG | HEART RATE: 80 BPM | TEMPERATURE: 98.1 F

## 2021-06-05 PROCEDURE — A6250 SKIN SEAL PROTECT MOISTURIZR: HCPCS

## 2021-06-07 ENCOUNTER — HOSPITAL ENCOUNTER (OUTPATIENT)
Dept: CARDIAC REHAB | Age: 46
Discharge: HOME | End: 2021-06-07
Payer: MEDICARE

## 2021-06-07 PROCEDURE — 97803 MED NUTRITION INDIV SUBSEQ: CPT | Performed by: DIETITIAN, REGISTERED

## 2021-06-07 NOTE — TELEMEDICINE
Sheila Schmidt was informed of the inherent limitations of a virtual visit,  and has consented to a virtual therapy visit on 2021. Information regarding emergency contact information for this patient during this visit is to contact:  Gama Olivas at [phone] in addition to calling [phone]. The patient was informed that at any time during the virtual visit, they can decide to stop the virtual visit. The patient verified that they are physically located in the [facility] for this virtual visit. ALEJANRDO Arenas  Follow Up Nutrition Counseling  2021    Referring Physician/Surgeon: Dr. Adam Garcia  Name: Shelia Schmidt : 1975 Age: 39 y.o. Gender: female  Reason for visit: Class 3 severe obesity follow-up    ASSESSMENT:  Refer to initial note 21 for full med /surg history & social history; she had a seizure on  and hurt her back and bit her tongue and side of her mouth and another seizure 21 - again bit tongue    Medications/Supplements:   Prior to Admission medications    Medication Sig Start Date End Date Taking? Authorizing Provider   nystatin (MYCOSTATIN) powder Apply to the affected areas (peristomal skin) 2 to 3 times daily  Patient taking differently: Apply 100,000 Units to affected area three (3) times daily. Apply . 5 -1 g to the affected areas (peristomal skin) 2 to 3 times daily  Indications: a skin infection due to the fungus Luisa 21   Carly Ridley MD   clonazePAM (KlonoPIN) 0.5 mg tablet Take 1 Tab by mouth two (2) times daily as needed for Anxiety. Max Daily Amount: 1 mg. Indications: anxiety 21   Carly Ridley MD   cranberry fruit extract (CRANBERRY PO) Take 10,000 Units by mouth daily. TAKE 1 TAB BY MOUTH DAILY    Provider, Historical   biotin 1 mg cap Take 1,000 mcg by mouth daily.  TAKE 1 TAB BY MOUTH DAILY    Provider, Historical   Humira,CF, Pen 40 mg/0.4 mL injection pen 40 mg by SubCUTAneous route every seven (7) days. per subsequtaneous injection Once a week  10/20/20   Provider, Historical   naloxone (Narcan) 4 mg/actuation nasal spray Narcan 4 mg/actuation nasal spray (spray 1 actuation via nasal for opiod overdose)  12/17/20   Provider, Historical   metroNIDAZOLE (FlagyL) 500 mg tablet Take 500 mg by mouth three (3) times daily. 9/30/20   Laila Gaspar MD   ciprofloxacin HCl (Cipro) 500 mg tablet Take 500 mg by mouth two (2) times a day. 9/30/20   Laila Gaspar MD   predniSONE (DELTASONE) 5 mg tablet Take 5 mg by mouth nightly. Provider, Historical   ondansetron (ZOFRAN ODT) 8 mg disintegrating tablet Take 8 mg by mouth every eight (8) hours as needed for Nausea. Provider, Historical   potassium chloride SR (KLOR-CON 10) 10 mEq tablet Take 10 mEq by mouth daily. 4/13/20   Concha Montoya MD   metFORMIN (GLUCOPHAGE) 500 mg tablet Take 500 mg by mouth two (2) times daily (with meals). 3/6/20   Provider, Historical   VASCULAR CATHETER: MLC AND PICC FLUSH PANEL 10 mL by IntraVENous route daily. Provider, Historical   heparin sodium,porcine (HEPARIN, PORCINE, IV) 3 mL by IntraVENous route daily. Provider, Historical   cholecalciferol (VITAMIN D3) (1000 Units /25 mcg) tablet Take 5,000 Units by mouth daily. Provider, Historical   FOLIC ACID PO Take 1 mg by mouth daily. TAKE 1 TAB BY MOUTH DAILY    Provider, Historical   furosemide (LASIX) 20 mg tablet Take 20 mg by mouth daily. TAKE AS NEED FOR FLUID RETENTION OF 3 POUNDS OR MORE UNTIL RETURN TO BASELINE WEIGHT    Provider, Historical   insulin NPH (HUMULIN N) 100 unit/mL (3 mL) inpn 35 Units by SubCUTAneous route every morning. Provider, Historical   insulin NPH (HUMULIN N) 100 unit/mL (3 mL) inpn 40 Units by SubCUTAneous route every evening. Provider, Historical   pyridoxine, vitamin B6, (VITAMIN B-6) 50 mg tablet Take 50 mg by mouth daily.     Provider, Historical   acetaminophen-caffeine 500-65 mg (EXCEDRINE TENSION HEADACHE) 500-65 mg tab Take 1 Tab by mouth every eight (8) hours as needed for Headache. Provider, Historical   azaTHIOprine (IMURAN) 50 mg tablet Take 150 mg by mouth daily. Provider, Historical   omega 3-DHA-EPA-fish oil (FISH OIL) 1,000 mg (120 mg-180 mg) capsule Take 1 Cap by mouth daily. Provider, Historical   mv-min-C-glutamin-lysine-hb124 (IMMUNE SUPPORT) 250-12.5 mg chew Take 1 Tab by mouth daily. Provider, Historical   aspirin (ASPIRIN) 325 mg tablet Take 325 mg by mouth daily. 8/15/19   Daphne Shay MD   TPN ADULT - CENTRAL 2,000 mL by IntraVENous route (central line) two (2) times a week. over 12 hours. Taper infusion rate up 1 hr/down1 hr    Justa Urban MD   morphine CR (MS CONTIN) 30 mg CR tablet Take 30 mg by mouth every eight (8) hours. UP TO 3X DAILY 4/7/21   Daphne Shay MD   morphine CR (MS CONTIN) 60 mg CR tablet Take 60 mg by mouth every twelve (12) hours. 1/5/21   Daphne Shay MD   omeprazole (PRILOSEC) 20 mg capsule Take 20 mg by mouth daily. 8/13/18   Daphne Shay MD   heparin sod,porcine/0.9 % NaCl (HEPARIN FLUSH IV) 3 mL by IntraVENous Push route daily. after TPN and post lab draw    Provider, Historical   sodium chloride (NORMAL SALINE FLUSH) 10-20 mL by IntraVENous Push route daily. before and after TPN and/or labs    Provider, Historical   dronabinol (MARINOL) 5 mg capsule Take 1 Cap by mouth two (2) times daily as needed (appetite). Max Daily Amount: 10 mg. Patient taking differently: Take 1 Cap by mouth every six (6) hours as needed for Nausea. 6/12/18   Daphne Shay MD   ferrous sulfate 325 mg (65 mg iron) tablet Take 1 Tab by mouth Daily (before breakfast). 6/12/18   Daphne Shay MD   ascorbic acid, vitamin C, (VITAMIN C) 250 mg tablet Take 250 mg by mouth two (2) times a day. Provider, Historical   promethazine (PHENERGAN) 25 mg tablet Take 25 mg by mouth every six (6) hours as needed for Nausea. Provider, Historical   VITAMIN B-12 5,000 mcg subl Take 5,000 mcg by mouth every month. 10/30/17   Provider, Historical   insulin lispro (HUMALOG) 100 unit/mL injection As instructed on discharge instructions  Patient taking differently: by SubCUTAneous route two (2) times a day. ~ 2 HRS AFTER START OF TPN CHECK BS: (THIS IS ALSO THE  SS SCHEDULE)  SS:  2 UNITS -249  3 UNITS -299  4 UNITS -349  IF GREATER THAN 350 CALL MD      ~1 HR AFTER TPN COMPLETION CHECK BS:  SS:  2 UNITS -199  3 UNITS -249  5 UNITS -299  7 UNITS -349  CALL MD FOR GREATER THAN 350 10/27/17   Renan Beckett MD       Pertinent Labs: Anthropometrics:    Ht Readings from Last 1 Encounters:   05/07/21 5' 4\" (1.626 m)       Wt Readings from Last 10 Encounters:   05/07/21 121.4 kg (267 lb 9.6 oz)   11/02/20 114.3 kg (252 lb)   08/03/20 118.8 kg (262 lb)   07/13/20 115.7 kg (255 lb)   11/25/19 122.1 kg (269 lb 2 oz)   10/23/19 125.6 kg (276 lb 12.8 oz)   09/15/19 128.2 kg (282 lb 11.2 oz)   08/13/19 125.4 kg (276 lb 8 oz)   04/22/19 121.6 kg (268 lb)   04/01/19 127.1 kg (280 lb 3.2 oz)      Reported Wt:     51/8/21 267 lbs There is no height or weight on file to calculate BMI. Category: Severe obesity  6/7/21  256 lbs      Exercise/Physical Actvity: walking to stop sign once a day and trying to move around more until set back from seizure; has been limited in activity since then    Estimated Nutritional Needs:  [id number] for weight loss at rate of approx 2 lbs per week ([address] with AF 1.2 and IF 1.2 d/t Crohn's & fistula; minus [phone] for weight loss)       93 grams protein / day (0.8 g pro / kg BW) or  g protein / day (15-30% calories from protein)      Diet History: Kept sporadic food records using [employer].  She did meet goal of adding cooked carrots to her diet, though intake of vegetables is still primarily mashed potatoes if any. She continues to drink only calorie free options with a few exceptions.   Calories ranged from 800 to 1800 with most days between 1000 and no more than 1500 calories. Of concern, most days protein intake was minimal, often <30 grams / day. Low calorie and low protein days are associated with poor appetite / feeling unwell. States she has Ensure Complete but forgot about them. Tried one Healthy Choice meal and disliked the flavor. Note: TPN provides 672 calories twice weekly      INITIAL NUTRITION  DIAGNOSIS:  Obesity related to food preferences and nutrition related knowledge deficit as evidenced by pt request for RD education and counseling and BMI>40. NUTRITION INTERVENTION:    30 minute individual telehealth consult with Beryl Harris. Reviewed goals set at last visit and progress made / not made towards goals. Assessed for any real or perceived barriers to obtaining goals. Revised / updated goals.          PATIENT GOALS:    Nutrition Goals:  - continue to drink only diet sodas, [employer] and unsweetened / diet teas vs sugar-sweetened beverages  - continue to choose \"light\" and \"reduced\" calorie / fat options (though check labels to avoid any sugar-free products containing sugar alcohols)  - continue goal of including non-starchy vegetables at each meal, with goal of half of meal (e.g. last night would have been 1 slice pizza and carrots vs 2 slices pizza)  - use Ensure Complete as a meal replacement rather than eat snacks or low protein foods when low appetite / energy  - include a good source of protein at every meal and most if not all snacks (low fat cheese, [place] yogurt, eggs, peanut butter, chicken, turkey) and / or drink an Enusre Complete or add a protein powder    Exercise/ Activity Goals:  - continue to increase physical activity; get up every 30 minutes and move your body in addition to daily walks    Other:  - keep a detailed food diary using the [employer]; suggested daily calorie intake 1600 calories / day but twice a week only [id number] calories from food as TPN will provide 672 calories;  pay attention to daily protein intake as well (aim for at least 60 grams per day). Follow-up scheduled Tuesday, June 22nd at 11 am ; visit will be by telehealth unless otherwise requested. Specific tips and techniques to facilitate compliance with above recommendations were provided and discussed. If further details are desired please feel free to contact me at [phone]. This phone number was also provided to the patient for any further questions or concerns. Jackie Mena RD    Earl Bernard is a 39 y.o. female being evaluated by a Virtual Visit (video visit) encounter to address concerns as mentioned above. A caregiver was present when appropriate. Due to this being a TeleHealth encounter ([facility]), evaluation of the following areas was limited: Nutrition Focused Physical Exam. Pursuant to the emergency declaration under the [address] [place] [address], [address] [place] [address] authority and the [employer] Act, this Virtual Visit was conducted with patient's (and/or legal guardian's) consent, to reduce the risk of exposure to COVID-19 and provide necessary medical care. Services were provided through a video synchronous discussion virtually to substitute for in-person encounter. --Jackie Mena RD on 6/7/2021 at 1:01 PM    An electronic signature was used to authenticate this note.

## 2021-06-08 ENCOUNTER — HOME CARE VISIT (OUTPATIENT)
Dept: SCHEDULING | Facility: HOME HEALTH | Age: 46
End: 2021-06-08
Payer: MEDICARE

## 2021-06-08 PROCEDURE — A6250 SKIN SEAL PROTECT MOISTURIZR: HCPCS

## 2021-06-08 PROCEDURE — G0300 HHS/HOSPICE OF LPN EA 15 MIN: HCPCS

## 2021-06-11 ENCOUNTER — HOME CARE VISIT (OUTPATIENT)
Dept: SCHEDULING | Facility: HOME HEALTH | Age: 46
End: 2021-06-11
Payer: MEDICARE

## 2021-06-11 PROCEDURE — G0299 HHS/HOSPICE OF RN EA 15 MIN: HCPCS

## 2021-06-12 VITALS
DIASTOLIC BLOOD PRESSURE: 76 MMHG | SYSTOLIC BLOOD PRESSURE: 114 MMHG | BODY MASS INDEX: 43.71 KG/M2 | OXYGEN SATURATION: 98 % | RESPIRATION RATE: 18 BRPM | HEIGHT: 64 IN | TEMPERATURE: 98.1 F | HEART RATE: 100 BPM | WEIGHT: 256 LBS

## 2021-06-12 VITALS
HEART RATE: 78 BPM | TEMPERATURE: 98.1 F | SYSTOLIC BLOOD PRESSURE: 118 MMHG | OXYGEN SATURATION: 99 % | RESPIRATION RATE: 18 BRPM | DIASTOLIC BLOOD PRESSURE: 64 MMHG

## 2021-06-16 ENCOUNTER — HOME CARE VISIT (OUTPATIENT)
Dept: SCHEDULING | Facility: HOME HEALTH | Age: 46
End: 2021-06-16
Payer: MEDICARE

## 2021-06-16 VITALS
SYSTOLIC BLOOD PRESSURE: 122 MMHG | OXYGEN SATURATION: 97 % | DIASTOLIC BLOOD PRESSURE: 80 MMHG | TEMPERATURE: 97.2 F | RESPIRATION RATE: 18 BRPM | HEART RATE: 84 BPM

## 2021-06-16 PROCEDURE — G0300 HHS/HOSPICE OF LPN EA 15 MIN: HCPCS

## 2021-06-16 PROCEDURE — 400014 HH F/U

## 2021-06-19 LAB
ADALIMUMAB DRUG LEVEL, 503866: 2.2 UG/ML
ALBUMIN SERPL-MCNC: 3.2 G/DL (ref 3.8–4.8)
ALBUMIN/GLOB SERPL: 1 {RATIO} (ref 1.2–2.2)
ALP SERPL-CCNC: 132 IU/L (ref 48–121)
ALT SERPL-CCNC: 13 IU/L (ref 0–32)
ANTI-ADALIMUMAB ANTIBODY, 503867: <25 NG/ML
AST SERPL-CCNC: 16 IU/L (ref 0–40)
BASOPHILS # BLD AUTO: 0.1 X10E3/UL (ref 0–0.2)
BASOPHILS NFR BLD AUTO: 1 %
BILIRUB SERPL-MCNC: 0.6 MG/DL (ref 0–1.2)
BUN SERPL-MCNC: 18 MG/DL (ref 6–24)
BUN/CREAT SERPL: 26 (ref 9–23)
CALCIUM SERPL-MCNC: 8.6 MG/DL (ref 8.7–10.2)
CHLORIDE SERPL-SCNC: 98 MMOL/L (ref 96–106)
CO2 SERPL-SCNC: 23 MMOL/L (ref 20–29)
CREAT SERPL-MCNC: 0.7 MG/DL (ref 0.57–1)
CRP SERPL-MCNC: 38 MG/L (ref 0–10)
EOSINOPHIL # BLD AUTO: 0.3 X10E3/UL (ref 0–0.4)
EOSINOPHIL NFR BLD AUTO: 2 %
ERYTHROCYTE [DISTWIDTH] IN BLOOD BY AUTOMATED COUNT: 13.3 % (ref 11.7–15.4)
GLOBULIN SER CALC-MCNC: 3.1 G/DL (ref 1.5–4.5)
GLUCOSE SERPL-MCNC: 115 MG/DL (ref 65–99)
HCT VFR BLD AUTO: 39.8 % (ref 34–46.6)
HGB BLD-MCNC: 12.9 G/DL (ref 11.1–15.9)
IMM GRANULOCYTES # BLD AUTO: 0.1 X10E3/UL (ref 0–0.1)
IMM GRANULOCYTES NFR BLD AUTO: 1 %
LYMPHOCYTES # BLD AUTO: 3.3 X10E3/UL (ref 0.7–3.1)
LYMPHOCYTES NFR BLD AUTO: 21 %
MAGNESIUM SERPL-MCNC: 2.2 MG/DL (ref 1.6–2.3)
MCH RBC QN AUTO: 31.2 PG (ref 26.6–33)
MCHC RBC AUTO-ENTMCNC: 32.4 G/DL (ref 31.5–35.7)
MCV RBC AUTO: 96 FL (ref 79–97)
MERCAPTOPURINE SERPL-MCNC: <20 NG/ML
MONOCYTES # BLD AUTO: 0.9 X10E3/UL (ref 0.1–0.9)
MONOCYTES NFR BLD AUTO: 6 %
NEUTROPHILS # BLD AUTO: 10.7 X10E3/UL (ref 1.4–7)
NEUTROPHILS NFR BLD AUTO: 69 %
PHOSPHATE SERPL-MCNC: 3.2 MG/DL (ref 3–4.3)
PLATELET # BLD AUTO: 280 X10E3/UL (ref 150–450)
POTASSIUM SERPL-SCNC: 3.6 MMOL/L (ref 3.5–5.2)
PREALB SERPL-MCNC: 31 MG/DL (ref 12–34)
PROT SERPL-MCNC: 6.3 G/DL (ref 6–8.5)
RBC # BLD AUTO: 4.13 X10E6/UL (ref 3.77–5.28)
SODIUM SERPL-SCNC: 139 MMOL/L (ref 134–144)
TRIGL SERPL-MCNC: 129 MG/DL (ref 0–149)
WBC # BLD AUTO: 15.4 X10E3/UL (ref 3.4–10.8)

## 2021-06-22 ENCOUNTER — TELEPHONE (OUTPATIENT)
Dept: CARDIAC REHAB | Age: 46
End: 2021-06-22

## 2021-06-22 ENCOUNTER — APPOINTMENT (OUTPATIENT)
Dept: CARDIAC REHAB | Age: 46
End: 2021-06-22
Payer: MEDICARE

## 2021-06-22 NOTE — TELEPHONE ENCOUNTER
6/22/2021 [facility]: Ms. Sosa Aas called to cancel today's appointment d/t her mother passing recently. Will call back to set up appointment.  Lynn Stafford

## 2021-06-23 ENCOUNTER — TELEPHONE (OUTPATIENT)
Dept: SURGERY | Age: 46
End: 2021-06-23

## 2021-06-23 NOTE — TELEPHONE ENCOUNTER
Pt called and stated [facility] faxed orders that included 3 boxes of 5 pouches each - \"Hawk pouches\". We sent back all but this part of the order. They have re-faxed this part of the order, but have not heard back. Pt says she needs them because the order barely makes a month. Requested a call back about the order. [facility] says they faxed it as recent as yesterday and as far back as 6/11/21.

## 2021-06-23 NOTE — TELEPHONE ENCOUNTER
Returned call to Ms Darrell De La Cruz verified all [address]. Patient states [facility] faxed orders for the Hawk pouches. I informed patient I don't see the fax. I advised to have refax.

## 2021-06-24 ENCOUNTER — HOME CARE VISIT (OUTPATIENT)
Dept: SCHEDULING | Facility: HOME HEALTH | Age: 46
End: 2021-06-24
Payer: MEDICARE

## 2021-06-24 VITALS
TEMPERATURE: 98.5 F | HEART RATE: 86 BPM | RESPIRATION RATE: 20 BRPM | DIASTOLIC BLOOD PRESSURE: 80 MMHG | SYSTOLIC BLOOD PRESSURE: 150 MMHG | OXYGEN SATURATION: 99 %

## 2021-06-24 PROCEDURE — A6250 SKIN SEAL PROTECT MOISTURIZR: HCPCS

## 2021-06-24 PROCEDURE — G0300 HHS/HOSPICE OF LPN EA 15 MIN: HCPCS

## 2021-06-28 ENCOUNTER — TELEPHONE (OUTPATIENT)
Dept: FAMILY MEDICINE CLINIC | Age: 46
End: 2021-06-28

## 2021-06-28 NOTE — TELEPHONE ENCOUNTER
They would like a order for PT and OT for strengthening. Please fax order to #[phone]. Please call with any questions

## 2021-06-30 ENCOUNTER — HOME CARE VISIT (OUTPATIENT)
Dept: SCHEDULING | Facility: HOME HEALTH | Age: 46
End: 2021-06-30
Payer: MEDICARE

## 2021-06-30 ENCOUNTER — TELEPHONE (OUTPATIENT)
Dept: FAMILY MEDICINE CLINIC | Age: 46
End: 2021-06-30

## 2021-06-30 VITALS
OXYGEN SATURATION: 99 % | RESPIRATION RATE: 18 BRPM | HEART RATE: 80 BPM | SYSTOLIC BLOOD PRESSURE: 126 MMHG | DIASTOLIC BLOOD PRESSURE: 79 MMHG | TEMPERATURE: 98.1 F

## 2021-06-30 PROCEDURE — G0300 HHS/HOSPICE OF LPN EA 15 MIN: HCPCS

## 2021-06-30 NOTE — TELEPHONE ENCOUNTER
Alta Khan is checking on home health orders sent to [facility] for PT & OT.   Please call [phone]

## 2021-06-30 NOTE — TELEPHONE ENCOUNTER
She has home health and her mom who was the main caretaker passed away last week. Home health has reached out for these referrals.  I have asked them to fax this information to you

## 2021-07-02 ENCOUNTER — HOME CARE VISIT (OUTPATIENT)
Dept: SCHEDULING | Facility: HOME HEALTH | Age: 46
End: 2021-07-02
Payer: MEDICARE

## 2021-07-02 PROCEDURE — G0300 HHS/HOSPICE OF LPN EA 15 MIN: HCPCS

## 2021-07-03 VITALS
DIASTOLIC BLOOD PRESSURE: 84 MMHG | SYSTOLIC BLOOD PRESSURE: 136 MMHG | RESPIRATION RATE: 18 BRPM | OXYGEN SATURATION: 99 % | TEMPERATURE: 98 F | HEART RATE: 85 BPM

## 2021-07-06 ENCOUNTER — HOME CARE VISIT (OUTPATIENT)
Dept: SCHEDULING | Facility: HOME HEALTH | Age: 46
End: 2021-07-06
Payer: MEDICARE

## 2021-07-06 PROCEDURE — G0300 HHS/HOSPICE OF LPN EA 15 MIN: HCPCS

## 2021-07-07 LAB
ALBUMIN SERPL-MCNC: 3 G/DL (ref 3.8–4.8)
ALBUMIN/GLOB SERPL: 1.1 {RATIO} (ref 1.2–2.2)
ALP SERPL-CCNC: 142 IU/L (ref 48–121)
ALT SERPL-CCNC: 43 IU/L (ref 0–32)
AST SERPL-CCNC: 43 IU/L (ref 0–40)
BASOPHILS # BLD AUTO: 0.1 X10E3/UL (ref 0–0.2)
BASOPHILS NFR BLD AUTO: 1 %
BILIRUB SERPL-MCNC: 1.6 MG/DL (ref 0–1.2)
BUN SERPL-MCNC: 22 MG/DL (ref 6–24)
BUN/CREAT SERPL: 37 (ref 9–23)
CALCIUM SERPL-MCNC: 8.4 MG/DL (ref 8.7–10.2)
CHLORIDE SERPL-SCNC: 97 MMOL/L (ref 96–106)
CO2 SERPL-SCNC: 24 MMOL/L (ref 20–29)
CREAT SERPL-MCNC: 0.6 MG/DL (ref 0.57–1)
EOSINOPHIL # BLD AUTO: 0.2 X10E3/UL (ref 0–0.4)
EOSINOPHIL NFR BLD AUTO: 1 %
ERYTHROCYTE [DISTWIDTH] IN BLOOD BY AUTOMATED COUNT: 13.9 % (ref 11.7–15.4)
GLOBULIN SER CALC-MCNC: 2.7 G/DL (ref 1.5–4.5)
GLUCOSE SERPL-MCNC: 98 MG/DL (ref 65–99)
HCT VFR BLD AUTO: 38.6 % (ref 34–46.6)
HGB BLD-MCNC: 12.6 G/DL (ref 11.1–15.9)
IMM GRANULOCYTES # BLD AUTO: 0.1 X10E3/UL (ref 0–0.1)
IMM GRANULOCYTES NFR BLD AUTO: 1 %
LYMPHOCYTES # BLD AUTO: 2.9 X10E3/UL (ref 0.7–3.1)
LYMPHOCYTES NFR BLD AUTO: 19 %
MAGNESIUM SERPL-MCNC: 2.4 MG/DL (ref 1.6–2.3)
MCH RBC QN AUTO: 31.8 PG (ref 26.6–33)
MCHC RBC AUTO-ENTMCNC: 32.6 G/DL (ref 31.5–35.7)
MCV RBC AUTO: 98 FL (ref 79–97)
MONOCYTES # BLD AUTO: 1.2 X10E3/UL (ref 0.1–0.9)
MONOCYTES NFR BLD AUTO: 8 %
NEUTROPHILS # BLD AUTO: 10.8 X10E3/UL (ref 1.4–7)
NEUTROPHILS NFR BLD AUTO: 70 %
PHOSPHATE SERPL-MCNC: 3.9 MG/DL (ref 3–4.3)
PLATELET # BLD AUTO: 243 X10E3/UL (ref 150–450)
POTASSIUM SERPL-SCNC: 3.5 MMOL/L (ref 3.5–5.2)
PREALB SERPL-MCNC: 31 MG/DL (ref 12–34)
PROT SERPL-MCNC: 5.7 G/DL (ref 6–8.5)
RBC # BLD AUTO: 3.96 X10E6/UL (ref 3.77–5.28)
SODIUM SERPL-SCNC: 139 MMOL/L (ref 134–144)
TRIGL SERPL-MCNC: 90 MG/DL (ref 0–149)
WBC # BLD AUTO: 15.4 X10E3/UL (ref 3.4–10.8)

## 2021-07-08 ENCOUNTER — HOME CARE VISIT (OUTPATIENT)
Dept: SCHEDULING | Facility: HOME HEALTH | Age: 46
End: 2021-07-08
Payer: MEDICARE

## 2021-07-08 PROCEDURE — A6250 SKIN SEAL PROTECT MOISTURIZR: HCPCS

## 2021-07-08 PROCEDURE — G0300 HHS/HOSPICE OF LPN EA 15 MIN: HCPCS

## 2021-07-09 ENCOUNTER — HOME CARE VISIT (OUTPATIENT)
Dept: SCHEDULING | Facility: HOME HEALTH | Age: 46
End: 2021-07-09
Payer: MEDICARE

## 2021-07-09 VITALS
DIASTOLIC BLOOD PRESSURE: 70 MMHG | TEMPERATURE: 96.8 F | HEART RATE: 80 BPM | OXYGEN SATURATION: 98 % | SYSTOLIC BLOOD PRESSURE: 106 MMHG | RESPIRATION RATE: 20 BRPM

## 2021-07-09 PROCEDURE — G0152 HHCP-SERV OF OT,EA 15 MIN: HCPCS

## 2021-07-09 PROCEDURE — G0151 HHCP-SERV OF PT,EA 15 MIN: HCPCS

## 2021-07-09 NOTE — PROGRESS NOTES
Called pt, spoke to pt, informed pt regarding below, pt verb understanding and agrees    Problem: Self Care Deficits Care Plan (Adult)  Goal: *Acute Goals and Plan of Care (Insert Text)  Occupational Therapy Goals  Initiated 4/4/2018  1. Patient will perform rolling and pulling self up to head of bed with min assist to assist with ADL's at bed level within 7 day(s). 2.  Patient will perform static sitting edge of bed > or = 10 minutes with supervision/set-up within 7 day(s). 3.  Patient will perform static standing with bilateral UE support on rolling walker > or = 3 minutes with minimal assistance/contact guard assist x's 2 within 7 day(s). 4.  Patient will perform toilet transfers with moderate assistance x's 2 to bedside commode within 7 days. 5.  Patient will perform bilateral UE AROM and strengthening exercises throughout day and grade exercises prn to increase demand within 7 day(s). Occupational Therapy EVALUATION  Patient: Acacia Almeida (38 y.o. female)  Date: 4/4/2018  Primary Diagnosis: FISTULA  Small bowel fistula  Procedure(s) (LRB):  EXPLORATORY LAPAROTOMY, LYSIS OF ADHESION X 5HOURS, SMALL BOWEL FISTULA TAKE DOWN X 2 AND WOUND VAC PLACEMENT (N/A) 28 Days Post-Op   Precautions:   Fall, Skin    ASSESSMENT :  Based on the objective data described below, the patient presents with decreased ability to perform all basic ADL's, limited by increased edema, increased pain bilateral feet and ankles, decreased activity tolerance, gross strength, fear of falling. Patient using female urinal and set-up in bed for colostomy care. Adjusted height of bedside commode to increase ability to transfer sit to stand and stand to sit. Will continue to follow and progress as tolerated. Overall total assist LE ADL's, max assist bathing bed level, min to mod assist UE ADL's, mod assist bed mobility, assist of 2 for transfers indicated at this time. Patient will benefit from skilled intervention to address the above impairments.   Patients rehabilitation potential is considered to be Good  Factors which may influence rehabilitation potential include:   []             None noted  []             Mental ability/status  [x]             Medical condition  []             Home/family situation and support systems  []             Safety awareness  [x]             Pain tolerance/management  []             Other:      PLAN :  Recommendations and Planned Interventions:  [x]               Self Care Training                  [x]        Therapeutic Activities  [x]               Functional Mobility Training    []        Cognitive Retraining  [x]               Therapeutic Exercises           [x]        Endurance Activities  [x]               Balance Training                   []        Neuromuscular Re-Education  []               Visual/Perceptual Training     [x]   Home Safety Training  [x]               Patient Education                 [x]        Family Training/Education  []               Other (comment):    Frequency/Duration: Patient will be followed by occupational therapy 5 times a week to address goals. Discharge Recommendations: Inpatient Rehab  Further Equipment Recommendations for Discharge:      SUBJECTIVE:   Patient stated I have a fear of falling since I fell.     OBJECTIVE DATA SUMMARY:   HISTORY:   Past Medical History:   Diagnosis Date    Adverse effect of anesthesia     WOKE DURING SURGERY    Arthritis     Blood clot in vein 2017    STOMACH    Crohn's disease ([facility] [address] 8/15/2011    DVT (deep venous thrombosis) ([facility] [address] 8/15/2011    LEFT LEG    Edema     GENERALIZED R/T TPN; WAIST DOWN    Incarcerated ventral hernia 8/15/2011    Lupus     Lyme disease     Psychiatric disorder     ANXIETY    Right flank pain 8/22/2011    Seizures ([facility] [address] 1990    LAST AT AGE 17    Stroke [facility]) 1990    age 17;  A WEEK POST OP, HAD SEIZURES AND STROKE, WAS IN COMA FOR A MONTH    Thyroid disease     HYPO-NO MEDS     Past Surgical History:   Procedure Laterality Date    HX APPENDECTOMY      HX GYN  2015    HIDRADENTIS LABIA    HX OTHER SURGICAL      multiple procedures related to her Crohn's disease    HX OTHER SURGICAL      ABDOMINAL SURGERY REMOVING COLON, 2/3 STOMACH, 1/3 SMALL INTESTINE    VA LAP, INCISIONAL HERNIA REPAIR,INCARCERATED  9-10-08    dr. Megan Lee       Prior Level of Function/Environment/Context: lives with her Mom, hospitalizations with complications and Rolinda Breath in past, was ambulatory without walker prior to fall 2/2018, Mom assisted with donning socks, slipped on slippers, has adaptive equipment for sylvia-care (tongs)  Occupations in which the patient is/was successful, what are the barriers preventing that success:   Performance Patterns (routines, roles, habits, and rituals):   Personal Interests and/or values:   Expanded or extensive additional review of patient history:     Home Situation  Home Environment: Private residence  # Steps to Enter: 0  One/Two Story Residence: One story  Living Alone: No  Support Systems: Home care staff, Parent  Patient Expects to be Discharged to[de-identified] Private residence  Current DME Used/Available at Home: [address], [employer], ryan, toilet tongs  []  Right hand dominant   []  Left hand dominant    EXAMINATION OF PERFORMANCE DEFICITS:  Cognitive/Behavioral Status:  Neurologic State: Alert  Orientation Level: Oriented X4  Cognition: Follows commands; Appropriate decision making; Appropriate for age attention/concentration; Appropriate safety awareness  Perception: Appears intact          Skin: tattoos over whole body, noted colostomy and wound vac  Edema: bilateral feet and per patient whole body due to meds    Hearing: Auditory  Auditory Impairment: None    Vision/Perceptual:                 Corrective Lenses: Glasses    Range of Motion:  AROM: Within functional limits        Strength:  Strength: Generally decreased, functional        Coordination:  Coordination: Within functional limits  Fine Motor Skills-Upper: Left Intact; Right Intact    Gross Motor Skills-Upper: Left Intact; Right Intact    Tone & Sensation:    Sensation: Intact                      Balance:  Sitting: Impaired  Standing: Impaired    Functional Mobility and Transfers for ADLs:  Bed Mobility:  Rolling: Maximum assistance; Total assistance  Supine to Sit: Maximum assistance; Additional time  Scooting: Moderate assistance; Additional time; Other (comment) (bed in reverse trend to scoot to head of bed)    Transfers:  Sit to Stand: Total assistance (declined at this time due to pain in ankles)  Bed to Chair: Total assistance  Toilet Transfer : Other (comment); Total assistance    ADL Assessment:  Feeding: Independent    Oral Facial Hygiene/Grooming: Setup    Bathing: Maximum assistance    Upper Body Dressing: Minimum assistance; Moderate assistance    Lower Body Dressing: Total assistance    Toileting: Other (comment); Setup (in bed with female urinal and for colostomy emptying)                ADL Intervention and task modifications:        educated on role of OT, positioning in bed due to slid to foot of bed and bilateral feet pressing on footboard. Patient with new complaint of ankle pain, required bed in reverse trend to bridge LE's and pull up to head of bed, resulting in increased shortness of breath and need for rest.     Instructed on potential equipment needs, benefit of getting on bedside commode as tolerated, adjusted height as she has fear of falling and reporting commode too low, however set on lowest setting, at this time using female urinal in bed. Educated on inpatient rehab routine, need for increased activity tolerance/improved pain management   instructed on benefit of increased time in chair/modified chair position, elevated bilateral LE's and resulted in increased ability to perform ankle pumps without restriction, verbalized increased comfort     Therapeutic Exercise: Instructed in performance of bilateral UE isometric scapular retraction, shoulder extension and elbow extension.  Instructed on techniques to incorporate throughout day and also instructed on UE AROM \"arm bike\" for 20-30 second intervals to increase activity tolerance due to shortness of breath with minimal exertion   Functional Measure:  Barthel Index:    Bathin  Bladder: 10  Bowels: 0  Groomin  Dressin  Feeding: 10  Mobility: 0  Stairs: 0  Toilet Use: 0  Transfer (Bed to Chair and Back): 0  Total: 25       Barthel and G-code impairment scale:  Percentage of impairment CH  0% CI  1-19% CJ  20-39% CK  40-59% CL  60-79% CM  80-99% CN  100%   Barthel Score 0-100 100 99-80 79-60 59-40 20-39 1-19   0   Barthel Score 0-20 20 17-19 13-16 9-12 5-8 1-4 0      The Barthel ADL Index: Guidelines  1. The index should be used as a record of what a patient does, not as a record of what a patient could do. 2. The main aim is to establish degree of independence from any help, physical or verbal, however minor and for whatever reason. 3. The need for supervision renders the patient not independent. 4. A patient's performance should be established using the best available evidence. Asking the patient, friends/relatives and nurses are the usual sources, but direct observation and common sense are also important. However direct testing is not needed. 5. Usually the patient's performance over the preceding 24-48 hours is important, but occasionally longer periods will be relevant. 6. Middle categories imply that the patient supplies over 50 per cent of the effort. 7. Use of aids to be independent is allowed. Simone Kapoor., Barthel, D.W. ([id number]). Functional evaluation: the Barthel Index. [address] [facility] (14)2. LANI Landis, Izabel Hooks., Bruce Colindres., Katharine, [address] [place] [address] (). Measuring the change indisability after inpatient rehabilitation; comparison of the responsiveness of the Barthel Index and Functional [place] Measure. Journal of Neurology, Neurosurgery, and Psychiatry, 66(4), 144-599.   NETTIE Rodriguez, TAMARA Lozano, Red Ballard M.A. (2004.) Assessment of post-stroke quality of life in cost-effectiveness studies: The usefulness of the Barthel Index and the EuroQoL-5D. Quality of Life Research, 13, 164-92         G codes: In compliance with CMSs Claims Based Outcome Reporting, the following G-code set was chosen for this patient based on their primary functional limitation being treated: The outcome measure chosen to determine the severity of the functional limitation was the Barthel Index with a score of 25/100 which was correlated with the impairment scale. ? Self Care:     - CURRENT STATUS: CL - 60%-79% impaired, limited or restricted    - GOAL STATUS: CK - 40%-59% impaired, limited or restricted    - D/C STATUS:  ---------------To be determined---------------     Occupational Therapy Evaluation Charge Determination   History Examination Decision-Making   LOW Complexity : Brief history review  LOW Complexity : 1-3 performance deficits relating to physical, cognitive , or psychosocial skils that result in activity limitations and / or participation restrictions  LOW Complexity : No comorbidities that affect functional and no verbal or physical assistance needed to complete eval tasks       Based on the above components, the patient evaluation is determined to be of the following complexity level: LOW   Pain:  Bilateral ankles and feet, right > left    Activity Tolerance:   Fair, limited by pain  Please refer to the flowsheet for vital signs taken during this treatment. After treatment:   [] Patient left in no apparent distress sitting up in chair  [x] Patient left in no apparent distress in bed  [x] Call bell left within reach  [x] Nursing notified  [] Caregiver present  [] Bed alarm activated    COMMUNICATION/EDUCATION:   The patients plan of care was discussed with: Registered Nurse. [x] Home safety education was provided and the patient/caregiver indicated understanding.   [x] Patient/family have participated as able in goal setting and plan of care. [] Patient/family agree to work toward stated goals and plan of care. [] Patient understands intent and goals of therapy, but is neutral about his/her participation. [] Patient is unable to participate in goal setting and plan of care. This patients plan of care is appropriate for delegation to SY.     Thank you for this referral.  Jori Tran, OTR/L  Time Calculation: 32 mins

## 2021-07-10 VITALS
SYSTOLIC BLOOD PRESSURE: 106 MMHG | HEART RATE: 85 BPM | DIASTOLIC BLOOD PRESSURE: 70 MMHG | TEMPERATURE: 98.1 F | OXYGEN SATURATION: 99 %

## 2021-07-12 ENCOUNTER — TELEPHONE (OUTPATIENT)
Dept: FAMILY MEDICINE CLINIC | Age: 46
End: 2021-07-12

## 2021-07-12 VITALS
TEMPERATURE: 98.7 F | SYSTOLIC BLOOD PRESSURE: 128 MMHG | RESPIRATION RATE: 18 BRPM | RESPIRATION RATE: 18 BRPM | HEART RATE: 78 BPM | OXYGEN SATURATION: 99 % | OXYGEN SATURATION: 99 % | HEART RATE: 80 BPM | SYSTOLIC BLOOD PRESSURE: 124 MMHG | TEMPERATURE: 97.5 F | DIASTOLIC BLOOD PRESSURE: 70 MMHG | DIASTOLIC BLOOD PRESSURE: 68 MMHG

## 2021-07-13 ENCOUNTER — HOME CARE VISIT (OUTPATIENT)
Dept: SCHEDULING | Facility: HOME HEALTH | Age: 46
End: 2021-07-13
Payer: MEDICARE

## 2021-07-13 PROCEDURE — G0152 HHCP-SERV OF OT,EA 15 MIN: HCPCS

## 2021-07-13 PROCEDURE — G0151 HHCP-SERV OF PT,EA 15 MIN: HCPCS

## 2021-07-13 NOTE — Clinical Note
Good afternoon,  This is OT for Seven. I have sent over orders for patient to receive a home health aide and  for community resources as she is struggling since the passing of her mother ( main caregiver). Please sign! If you have any questions, please reach out.    Ashu Hamilton - [phone]

## 2021-07-13 NOTE — PROGRESS NOTES
Bedside and Verbal shift change report given to see (oncoming nurse) by Ofelia Figueroa (offgoing nurse). Report included the following information SBAR, Kardex, ED Summary, OR Summary, Procedure Summary, Intake/Output, MAR, Recent Results and Cardiac Rhythm nsr. Is This A New Presentation, Or A Follow-Up?: Skin Lesion What Type Of Note Output Would You Prefer (Optional)?: Standard Output How Severe Is Your Skin Lesion?: mild Has Your Skin Lesion Been Treated?: not been treated

## 2021-07-14 ENCOUNTER — HOME CARE VISIT (OUTPATIENT)
Dept: SCHEDULING | Facility: HOME HEALTH | Age: 46
End: 2021-07-14
Payer: MEDICARE

## 2021-07-14 VITALS
OXYGEN SATURATION: 99 % | RESPIRATION RATE: 18 BRPM | SYSTOLIC BLOOD PRESSURE: 110 MMHG | DIASTOLIC BLOOD PRESSURE: 70 MMHG | TEMPERATURE: 97.7 F | HEART RATE: 96 BPM

## 2021-07-14 VITALS
SYSTOLIC BLOOD PRESSURE: 107 MMHG | OXYGEN SATURATION: 96 % | DIASTOLIC BLOOD PRESSURE: 68 MMHG | TEMPERATURE: 97.4 F | HEART RATE: 69 BPM | RESPIRATION RATE: 18 BRPM

## 2021-07-14 PROCEDURE — G0300 HHS/HOSPICE OF LPN EA 15 MIN: HCPCS

## 2021-07-14 NOTE — CASE COMMUNICATION
Good morning,     I just left you a message. They told me you need verbal order. You go ahead and call my phone number is [phone]. I will sign the documents as soon as I get them.     Kalina Gutierrez MD

## 2021-07-15 ENCOUNTER — HOME CARE VISIT (OUTPATIENT)
Dept: SCHEDULING | Facility: HOME HEALTH | Age: 46
End: 2021-07-15
Payer: MEDICARE

## 2021-07-15 ENCOUNTER — TELEPHONE (OUTPATIENT)
Dept: SURGERY | Age: 46
End: 2021-07-15

## 2021-07-15 VITALS
SYSTOLIC BLOOD PRESSURE: 121 MMHG | RESPIRATION RATE: 20 BRPM | DIASTOLIC BLOOD PRESSURE: 90 MMHG | TEMPERATURE: 97.8 F | HEART RATE: 81 BPM | OXYGEN SATURATION: 97 %

## 2021-07-15 PROCEDURE — G0152 HHCP-SERV OF OT,EA 15 MIN: HCPCS

## 2021-07-15 PROCEDURE — G0151 HHCP-SERV OF PT,EA 15 MIN: HCPCS

## 2021-07-15 PROCEDURE — 400014 HH F/U

## 2021-07-15 NOTE — TELEPHONE ENCOUNTER
Pt called and says she apologizes, but they will be faxing another supply order that needs a signature.

## 2021-07-16 ENCOUNTER — HOME CARE VISIT (OUTPATIENT)
Dept: SCHEDULING | Facility: HOME HEALTH | Age: 46
End: 2021-07-16
Payer: MEDICARE

## 2021-07-16 VITALS
RESPIRATION RATE: 20 BRPM | SYSTOLIC BLOOD PRESSURE: 110 MMHG | DIASTOLIC BLOOD PRESSURE: 75 MMHG | HEART RATE: 20 BPM | TEMPERATURE: 97.9 F | OXYGEN SATURATION: 93 %

## 2021-07-16 PROCEDURE — G0300 HHS/HOSPICE OF LPN EA 15 MIN: HCPCS

## 2021-07-16 PROCEDURE — G0156 HHCP-SVS OF AIDE,EA 15 MIN: HCPCS

## 2021-07-18 VITALS
RESPIRATION RATE: 18 BRPM | SYSTOLIC BLOOD PRESSURE: 122 MMHG | TEMPERATURE: 97.9 F | SYSTOLIC BLOOD PRESSURE: 135 MMHG | TEMPERATURE: 98 F | HEART RATE: 75 BPM | OXYGEN SATURATION: 99 % | DIASTOLIC BLOOD PRESSURE: 60 MMHG | OXYGEN SATURATION: 99 % | DIASTOLIC BLOOD PRESSURE: 68 MMHG | HEART RATE: 76 BPM | RESPIRATION RATE: 18 BRPM

## 2021-07-19 ENCOUNTER — TELEPHONE (OUTPATIENT)
Dept: SURGERY | Age: 46
End: 2021-07-19

## 2021-07-19 ENCOUNTER — HOME CARE VISIT (OUTPATIENT)
Dept: SCHEDULING | Facility: HOME HEALTH | Age: 46
End: 2021-07-19
Payer: MEDICARE

## 2021-07-19 VITALS
TEMPERATURE: 98.4 F | HEART RATE: 142 BPM | SYSTOLIC BLOOD PRESSURE: 96 MMHG | DIASTOLIC BLOOD PRESSURE: 63 MMHG | OXYGEN SATURATION: 98 %

## 2021-07-19 PROCEDURE — G0156 HHCP-SVS OF AIDE,EA 15 MIN: HCPCS

## 2021-07-19 PROCEDURE — G0158 HHC OT ASSISTANT EA 15: HCPCS

## 2021-07-19 PROCEDURE — G0155 HHCP-SVS OF CSW,EA 15 MIN: HCPCS

## 2021-07-19 NOTE — TELEPHONE ENCOUNTER
Returned call to patient. Two patient identifiers used. Patient was calling to confirm if we have received an order from [employer] for her pouches. Explained to patient I will have to check with Dr. Austin Cordero to see if he has the order. Patient in agreement. Confirmed fax numbers. Patient had order faxed to [phone]. I will leave a note for Dr. Austin Cordero to check for order.

## 2021-07-19 NOTE — TELEPHONE ENCOUNTER
Patient called stating that she needs a signature on [facility] supply orders. Faxed to [phone] number a few weeks ago and still has not received anything back. Please advise.

## 2021-07-20 ENCOUNTER — HOME CARE VISIT (OUTPATIENT)
Dept: SCHEDULING | Facility: HOME HEALTH | Age: 46
End: 2021-07-20
Payer: MEDICARE

## 2021-07-20 PROCEDURE — G0300 HHS/HOSPICE OF LPN EA 15 MIN: HCPCS

## 2021-07-21 ENCOUNTER — HOME CARE VISIT (OUTPATIENT)
Dept: SCHEDULING | Facility: HOME HEALTH | Age: 46
End: 2021-07-21
Payer: MEDICARE

## 2021-07-21 VITALS
OXYGEN SATURATION: 100 % | DIASTOLIC BLOOD PRESSURE: 59 MMHG | TEMPERATURE: 97.4 F | HEART RATE: 62 BPM | SYSTOLIC BLOOD PRESSURE: 93 MMHG

## 2021-07-21 PROCEDURE — G0151 HHCP-SERV OF PT,EA 15 MIN: HCPCS

## 2021-07-21 PROCEDURE — G0158 HHC OT ASSISTANT EA 15: HCPCS

## 2021-07-21 PROCEDURE — G0156 HHCP-SVS OF AIDE,EA 15 MIN: HCPCS

## 2021-07-22 ENCOUNTER — HOME CARE VISIT (OUTPATIENT)
Dept: SCHEDULING | Facility: HOME HEALTH | Age: 46
End: 2021-07-22
Payer: MEDICARE

## 2021-07-22 VITALS
HEART RATE: 60 BPM | TEMPERATURE: 96.8 F | OXYGEN SATURATION: 98 % | SYSTOLIC BLOOD PRESSURE: 99 MMHG | DIASTOLIC BLOOD PRESSURE: 68 MMHG

## 2021-07-22 PROCEDURE — G0300 HHS/HOSPICE OF LPN EA 15 MIN: HCPCS

## 2021-07-23 ENCOUNTER — HOME CARE VISIT (OUTPATIENT)
Dept: SCHEDULING | Facility: HOME HEALTH | Age: 46
End: 2021-07-23
Payer: MEDICARE

## 2021-07-23 VITALS
SYSTOLIC BLOOD PRESSURE: 119 MMHG | OXYGEN SATURATION: 99 % | DIASTOLIC BLOOD PRESSURE: 74 MMHG | TEMPERATURE: 97.2 F | RESPIRATION RATE: 18 BRPM | SYSTOLIC BLOOD PRESSURE: 120 MMHG | HEART RATE: 88 BPM | HEART RATE: 83 BPM | DIASTOLIC BLOOD PRESSURE: 62 MMHG | OXYGEN SATURATION: 99 % | TEMPERATURE: 98.4 F | RESPIRATION RATE: 18 BRPM

## 2021-07-23 PROCEDURE — A6250 SKIN SEAL PROTECT MOISTURIZR: HCPCS

## 2021-07-23 PROCEDURE — G0151 HHCP-SERV OF PT,EA 15 MIN: HCPCS

## 2021-07-24 VITALS
HEART RATE: 65 BPM | RESPIRATION RATE: 20 BRPM | DIASTOLIC BLOOD PRESSURE: 63 MMHG | TEMPERATURE: 96.6 F | OXYGEN SATURATION: 99 % | SYSTOLIC BLOOD PRESSURE: 95 MMHG

## 2021-07-26 ENCOUNTER — HOME CARE VISIT (OUTPATIENT)
Dept: HOME HEALTH SERVICES | Facility: HOME HEALTH | Age: 46
End: 2021-07-26
Payer: MEDICARE

## 2021-07-26 ENCOUNTER — HOME CARE VISIT (OUTPATIENT)
Dept: SCHEDULING | Facility: HOME HEALTH | Age: 46
End: 2021-07-26
Payer: MEDICARE

## 2021-07-26 PROCEDURE — G0300 HHS/HOSPICE OF LPN EA 15 MIN: HCPCS

## 2021-07-27 ENCOUNTER — HOME CARE VISIT (OUTPATIENT)
Dept: HOME HEALTH SERVICES | Facility: HOME HEALTH | Age: 46
End: 2021-07-27
Payer: MEDICARE

## 2021-07-27 ENCOUNTER — HOME CARE VISIT (OUTPATIENT)
Dept: SCHEDULING | Facility: HOME HEALTH | Age: 46
End: 2021-07-27
Payer: MEDICARE

## 2021-07-27 VITALS
RESPIRATION RATE: 18 BRPM | OXYGEN SATURATION: 99 % | TEMPERATURE: 98.4 F | DIASTOLIC BLOOD PRESSURE: 66 MMHG | HEART RATE: 70 BPM | SYSTOLIC BLOOD PRESSURE: 125 MMHG

## 2021-07-27 VITALS — OXYGEN SATURATION: 99 % | HEART RATE: 68 BPM | RESPIRATION RATE: 17 BRPM

## 2021-07-27 PROCEDURE — G0152 HHCP-SERV OF OT,EA 15 MIN: HCPCS

## 2021-07-27 PROCEDURE — G0157 HHC PT ASSISTANT EA 15: HCPCS

## 2021-07-28 ENCOUNTER — HOME CARE VISIT (OUTPATIENT)
Dept: SCHEDULING | Facility: HOME HEALTH | Age: 46
End: 2021-07-28
Payer: MEDICARE

## 2021-07-28 VITALS — SYSTOLIC BLOOD PRESSURE: 111 MMHG | DIASTOLIC BLOOD PRESSURE: 73 MMHG | TEMPERATURE: 98.1 F | HEART RATE: 87 BPM

## 2021-07-28 PROCEDURE — G0300 HHS/HOSPICE OF LPN EA 15 MIN: HCPCS

## 2021-07-29 ENCOUNTER — HOME CARE VISIT (OUTPATIENT)
Dept: SCHEDULING | Facility: HOME HEALTH | Age: 46
End: 2021-07-29
Payer: MEDICARE

## 2021-07-29 ENCOUNTER — TELEPHONE (OUTPATIENT)
Dept: SURGERY | Age: 46
End: 2021-07-29

## 2021-07-29 ENCOUNTER — HOME CARE VISIT (OUTPATIENT)
Dept: HOME HEALTH SERVICES | Facility: HOME HEALTH | Age: 46
End: 2021-07-29
Payer: MEDICARE

## 2021-07-29 VITALS
TEMPERATURE: 97.5 F | HEART RATE: 83 BPM | DIASTOLIC BLOOD PRESSURE: 68 MMHG | OXYGEN SATURATION: 97 % | SYSTOLIC BLOOD PRESSURE: 110 MMHG

## 2021-07-29 PROCEDURE — G0152 HHCP-SERV OF OT,EA 15 MIN: HCPCS

## 2021-07-29 PROCEDURE — G0151 HHCP-SERV OF PT,EA 15 MIN: HCPCS

## 2021-07-29 NOTE — TELEPHONE ENCOUNTER
Pt called and stated that [employer] faxed over her medical supplies order and she needs Dr. Alexis Valentin to just sign it and fax it back over. She apologized that that keep sending the orders separately.

## 2021-07-30 VITALS
DIASTOLIC BLOOD PRESSURE: 79 MMHG | TEMPERATURE: 98.4 F | SYSTOLIC BLOOD PRESSURE: 130 MMHG | OXYGEN SATURATION: 99 % | HEART RATE: 76 BPM

## 2021-07-31 VITALS
DIASTOLIC BLOOD PRESSURE: 82 MMHG | RESPIRATION RATE: 18 BRPM | SYSTOLIC BLOOD PRESSURE: 132 MMHG | TEMPERATURE: 98.4 F | OXYGEN SATURATION: 99 % | HEART RATE: 86 BPM

## 2021-08-02 ENCOUNTER — TELEPHONE (OUTPATIENT)
Dept: SURGERY | Age: 46
End: 2021-08-02

## 2021-08-02 ENCOUNTER — HOME CARE VISIT (OUTPATIENT)
Dept: SCHEDULING | Facility: HOME HEALTH | Age: 46
End: 2021-08-02
Payer: MEDICARE

## 2021-08-02 PROCEDURE — G0156 HHCP-SVS OF AIDE,EA 15 MIN: HCPCS

## 2021-08-02 PROCEDURE — G0151 HHCP-SERV OF PT,EA 15 MIN: HCPCS

## 2021-08-02 NOTE — TELEPHONE ENCOUNTER
Patient identified with two patient identifiers. Patient informed order received and left for provider to review and sign. Patient requesting another provider to review and sign if possible. Patient informed I will discuss with provider and return call. Patient informed discuss with NP requested signed and faxed confirmation received. Discussed with patient having covering provider who she sees routinely take over reviewing and signing order. Patient has no been in our office in 2 years for follow up assessment. Patient states she would like to continue follow up with Dr. Gus Hand. Patient agreed to return call to schedule appointment prior to requesting next script.

## 2021-08-02 NOTE — TELEPHONE ENCOUNTER
Patient called and stated that she needs her supplies. [employer] has faxed over the order several times and they have not received it back yet. Please give the pt a call with and update. Pt would also like to know if someone else could sign the orders.

## 2021-08-03 ENCOUNTER — HOME CARE VISIT (OUTPATIENT)
Dept: SCHEDULING | Facility: HOME HEALTH | Age: 46
End: 2021-08-03
Payer: MEDICARE

## 2021-08-03 VITALS
SYSTOLIC BLOOD PRESSURE: 107 MMHG | RESPIRATION RATE: 20 BRPM | TEMPERATURE: 98.1 F | OXYGEN SATURATION: 96 % | HEART RATE: 66 BPM | DIASTOLIC BLOOD PRESSURE: 71 MMHG

## 2021-08-03 PROCEDURE — G0300 HHS/HOSPICE OF LPN EA 15 MIN: HCPCS

## 2021-08-04 ENCOUNTER — HOME CARE VISIT (OUTPATIENT)
Dept: SCHEDULING | Facility: HOME HEALTH | Age: 46
End: 2021-08-04
Payer: MEDICARE

## 2021-08-04 ENCOUNTER — TELEPHONE (OUTPATIENT)
Dept: CARDIAC REHAB | Age: 46
End: 2021-08-04

## 2021-08-04 VITALS
DIASTOLIC BLOOD PRESSURE: 63 MMHG | HEART RATE: 68 BPM | OXYGEN SATURATION: 99 % | SYSTOLIC BLOOD PRESSURE: 95 MMHG | TEMPERATURE: 97.3 F

## 2021-08-04 PROCEDURE — G0158 HHC OT ASSISTANT EA 15: HCPCS

## 2021-08-04 PROCEDURE — G0151 HHCP-SERV OF PT,EA 15 MIN: HCPCS

## 2021-08-04 PROCEDURE — G0156 HHCP-SVS OF AIDE,EA 15 MIN: HCPCS

## 2021-08-05 ENCOUNTER — HOME CARE VISIT (OUTPATIENT)
Dept: SCHEDULING | Facility: HOME HEALTH | Age: 46
End: 2021-08-05
Payer: MEDICARE

## 2021-08-05 PROCEDURE — G0300 HHS/HOSPICE OF LPN EA 15 MIN: HCPCS

## 2021-08-06 ENCOUNTER — HOME CARE VISIT (OUTPATIENT)
Dept: SCHEDULING | Facility: HOME HEALTH | Age: 46
End: 2021-08-06
Payer: MEDICARE

## 2021-08-06 VITALS
SYSTOLIC BLOOD PRESSURE: 111 MMHG | HEART RATE: 83 BPM | OXYGEN SATURATION: 99 % | RESPIRATION RATE: 28 BRPM | TEMPERATURE: 98.2 F | DIASTOLIC BLOOD PRESSURE: 76 MMHG

## 2021-08-06 VITALS
HEART RATE: 80 BPM | OXYGEN SATURATION: 99 % | DIASTOLIC BLOOD PRESSURE: 72 MMHG | TEMPERATURE: 97.6 F | DIASTOLIC BLOOD PRESSURE: 70 MMHG | RESPIRATION RATE: 18 BRPM | TEMPERATURE: 98 F | RESPIRATION RATE: 18 BRPM | HEART RATE: 78 BPM | OXYGEN SATURATION: 99 % | SYSTOLIC BLOOD PRESSURE: 127 MMHG | SYSTOLIC BLOOD PRESSURE: 132 MMHG

## 2021-08-06 VITALS
TEMPERATURE: 97.3 F | SYSTOLIC BLOOD PRESSURE: 113 MMHG | DIASTOLIC BLOOD PRESSURE: 74 MMHG | RESPIRATION RATE: 18 BRPM | HEART RATE: 75 BPM | OXYGEN SATURATION: 98 %

## 2021-08-06 PROCEDURE — A6250 SKIN SEAL PROTECT MOISTURIZR: HCPCS

## 2021-08-06 PROCEDURE — G0152 HHCP-SERV OF OT,EA 15 MIN: HCPCS

## 2021-08-09 ENCOUNTER — HOME CARE VISIT (OUTPATIENT)
Dept: SCHEDULING | Facility: HOME HEALTH | Age: 46
End: 2021-08-09
Payer: MEDICARE

## 2021-08-09 PROCEDURE — G0300 HHS/HOSPICE OF LPN EA 15 MIN: HCPCS

## 2021-08-10 ENCOUNTER — HOME CARE VISIT (OUTPATIENT)
Dept: SCHEDULING | Facility: HOME HEALTH | Age: 46
End: 2021-08-10
Payer: MEDICARE

## 2021-08-10 VITALS
HEART RATE: 65 BPM | OXYGEN SATURATION: 97 % | SYSTOLIC BLOOD PRESSURE: 102 MMHG | TEMPERATURE: 98.1 F | DIASTOLIC BLOOD PRESSURE: 64 MMHG

## 2021-08-10 PROCEDURE — G0157 HHC PT ASSISTANT EA 15: HCPCS

## 2021-08-10 PROCEDURE — G0156 HHCP-SVS OF AIDE,EA 15 MIN: HCPCS

## 2021-08-10 PROCEDURE — G0152 HHCP-SERV OF OT,EA 15 MIN: HCPCS

## 2021-08-11 VITALS
RESPIRATION RATE: 18 BRPM | HEART RATE: 73 BPM | DIASTOLIC BLOOD PRESSURE: 74 MMHG | TEMPERATURE: 97.9 F | SYSTOLIC BLOOD PRESSURE: 112 MMHG | OXYGEN SATURATION: 99 %

## 2021-08-11 VITALS
DIASTOLIC BLOOD PRESSURE: 66 MMHG | HEART RATE: 74 BPM | OXYGEN SATURATION: 99 % | TEMPERATURE: 98 F | SYSTOLIC BLOOD PRESSURE: 124 MMHG | RESPIRATION RATE: 18 BRPM

## 2021-08-12 ENCOUNTER — HOME CARE VISIT (OUTPATIENT)
Dept: HOME HEALTH SERVICES | Facility: HOME HEALTH | Age: 46
End: 2021-08-12
Payer: MEDICARE

## 2021-08-12 ENCOUNTER — HOME CARE VISIT (OUTPATIENT)
Dept: SCHEDULING | Facility: HOME HEALTH | Age: 46
End: 2021-08-12
Payer: MEDICARE

## 2021-08-12 PROCEDURE — G0156 HHCP-SVS OF AIDE,EA 15 MIN: HCPCS

## 2021-08-12 PROCEDURE — G0151 HHCP-SERV OF PT,EA 15 MIN: HCPCS

## 2021-08-12 PROCEDURE — G0152 HHCP-SERV OF OT,EA 15 MIN: HCPCS

## 2021-08-13 ENCOUNTER — HOME CARE VISIT (OUTPATIENT)
Dept: SCHEDULING | Facility: HOME HEALTH | Age: 46
End: 2021-08-13
Payer: MEDICARE

## 2021-08-13 VITALS
DIASTOLIC BLOOD PRESSURE: 68 MMHG | BODY MASS INDEX: 43.87 KG/M2 | RESPIRATION RATE: 16 BRPM | TEMPERATURE: 98.6 F | WEIGHT: 255.6 LBS | HEART RATE: 99 BPM | OXYGEN SATURATION: 98 % | SYSTOLIC BLOOD PRESSURE: 110 MMHG

## 2021-08-13 VITALS
RESPIRATION RATE: 12 BRPM | TEMPERATURE: 97.5 F | DIASTOLIC BLOOD PRESSURE: 72 MMHG | SYSTOLIC BLOOD PRESSURE: 112 MMHG | HEART RATE: 69 BPM | OXYGEN SATURATION: 99 %

## 2021-08-13 VITALS
TEMPERATURE: 97.3 F | SYSTOLIC BLOOD PRESSURE: 96 MMHG | OXYGEN SATURATION: 98 % | DIASTOLIC BLOOD PRESSURE: 62 MMHG | RESPIRATION RATE: 16 BRPM | HEART RATE: 84 BPM

## 2021-08-13 PROCEDURE — G0299 HHS/HOSPICE OF RN EA 15 MIN: HCPCS

## 2021-08-14 NOTE — PROGRESS NOTES
Please call and make sure that her home health care nurse recent labs showed low albumin,mildly low calcium, and mildly low protein, but normal prealbumin. She is to be scheduled for an appointment as she is overdue.

## 2021-08-16 ENCOUNTER — HOME CARE VISIT (OUTPATIENT)
Dept: SCHEDULING | Facility: HOME HEALTH | Age: 46
End: 2021-08-16
Payer: MEDICARE

## 2021-08-16 VITALS
SYSTOLIC BLOOD PRESSURE: 92 MMHG | DIASTOLIC BLOOD PRESSURE: 67 MMHG | TEMPERATURE: 97.5 F | OXYGEN SATURATION: 99 % | HEART RATE: 70 BPM

## 2021-08-16 PROCEDURE — G0157 HHC PT ASSISTANT EA 15: HCPCS

## 2021-08-16 PROCEDURE — 400014 HH F/U

## 2021-08-16 PROCEDURE — G0156 HHCP-SVS OF AIDE,EA 15 MIN: HCPCS

## 2021-08-17 ENCOUNTER — HOME CARE VISIT (OUTPATIENT)
Dept: SCHEDULING | Facility: HOME HEALTH | Age: 46
End: 2021-08-17
Payer: MEDICARE

## 2021-08-17 ENCOUNTER — HOME CARE VISIT (OUTPATIENT)
Dept: HOME HEALTH SERVICES | Facility: HOME HEALTH | Age: 46
End: 2021-08-17
Payer: MEDICARE

## 2021-08-17 PROCEDURE — G0300 HHS/HOSPICE OF LPN EA 15 MIN: HCPCS

## 2021-08-18 ENCOUNTER — HOME CARE VISIT (OUTPATIENT)
Dept: SCHEDULING | Facility: HOME HEALTH | Age: 46
End: 2021-08-18
Payer: MEDICARE

## 2021-08-18 VITALS
OXYGEN SATURATION: 100 % | DIASTOLIC BLOOD PRESSURE: 67 MMHG | HEART RATE: 71 BPM | TEMPERATURE: 97.9 F | SYSTOLIC BLOOD PRESSURE: 99 MMHG

## 2021-08-18 PROCEDURE — G0158 HHC OT ASSISTANT EA 15: HCPCS

## 2021-08-18 PROCEDURE — G0157 HHC PT ASSISTANT EA 15: HCPCS

## 2021-08-19 ENCOUNTER — HOME CARE VISIT (OUTPATIENT)
Dept: SCHEDULING | Facility: HOME HEALTH | Age: 46
End: 2021-08-19
Payer: MEDICARE

## 2021-08-19 ENCOUNTER — HOME CARE VISIT (OUTPATIENT)
Dept: HOME HEALTH SERVICES | Facility: HOME HEALTH | Age: 46
End: 2021-08-19
Payer: MEDICARE

## 2021-08-19 VITALS
DIASTOLIC BLOOD PRESSURE: 61 MMHG | SYSTOLIC BLOOD PRESSURE: 94 MMHG | OXYGEN SATURATION: 99 % | TEMPERATURE: 97.4 F | HEART RATE: 60 BPM

## 2021-08-19 PROCEDURE — G0152 HHCP-SERV OF OT,EA 15 MIN: HCPCS

## 2021-08-19 PROCEDURE — G0300 HHS/HOSPICE OF LPN EA 15 MIN: HCPCS

## 2021-08-20 ENCOUNTER — HOME CARE VISIT (OUTPATIENT)
Dept: SCHEDULING | Facility: HOME HEALTH | Age: 46
End: 2021-08-20
Payer: MEDICARE

## 2021-08-20 ENCOUNTER — VIRTUAL VISIT (OUTPATIENT)
Dept: FAMILY MEDICINE CLINIC | Age: 46
End: 2021-08-20
Payer: MEDICARE

## 2021-08-20 VITALS
HEART RATE: 61 BPM | TEMPERATURE: 98.2 F | SYSTOLIC BLOOD PRESSURE: 104 MMHG | OXYGEN SATURATION: 99 % | DIASTOLIC BLOOD PRESSURE: 67 MMHG | RESPIRATION RATE: 18 BRPM

## 2021-08-20 DIAGNOSIS — E03.9 ACQUIRED HYPOTHYROIDISM: ICD-10-CM

## 2021-08-20 DIAGNOSIS — K63.2 SMALL BOWEL FISTULA: ICD-10-CM

## 2021-08-20 DIAGNOSIS — F41.9 ANXIETY: ICD-10-CM

## 2021-08-20 DIAGNOSIS — A09 INTESTINAL INFECTION: ICD-10-CM

## 2021-08-20 DIAGNOSIS — E55.9 VITAMIN D DEFICIENCY: ICD-10-CM

## 2021-08-20 DIAGNOSIS — R10.84 GENERALIZED ABDOMINAL PAIN: ICD-10-CM

## 2021-08-20 DIAGNOSIS — K50.018 CROHN'S DISEASE OF SMALL INTESTINE WITH OTHER COMPLICATION (HCC): Primary | ICD-10-CM

## 2021-08-20 DIAGNOSIS — D64.9 CHRONIC ANEMIA: ICD-10-CM

## 2021-08-20 DIAGNOSIS — E78.00 PURE HYPERCHOLESTEROLEMIA: ICD-10-CM

## 2021-08-20 DIAGNOSIS — N28.89 RIGHT RENAL MASS: ICD-10-CM

## 2021-08-20 DIAGNOSIS — K63.2 ENTEROCUTANEOUS FISTULA: ICD-10-CM

## 2021-08-20 PROCEDURE — G0156 HHCP-SVS OF AIDE,EA 15 MIN: HCPCS

## 2021-08-20 PROCEDURE — 99214 OFFICE O/P EST MOD 30 MIN: CPT | Performed by: STUDENT IN AN ORGANIZED HEALTH CARE EDUCATION/TRAINING PROGRAM

## 2021-08-20 RX ORDER — POTASSIUM CHLORIDE 750 MG/1
CAPSULE, EXTENDED RELEASE ORAL
COMMUNITY

## 2021-08-20 NOTE — PROGRESS NOTES
Consent: Corey Pritchard, who was seen by synchronous (real-time) audio-video technology, and/or her healthcare decision maker, is aware that this patient-initiated, Telehealth encounter on 8/20/2021 is a billable service, with coverage as determined by her insurance carrier. She is aware that she may receive a bill and has provided verbal consent to proceed: YES-Consent obtained within past 12 months        712  Subjective: Corey Pritchard is a 39 y.o. female who was seen for Follow Up Chronic Condition and Labs (Review results with pt.)  She is here for follow-up of chronic conditions. Patient has history of Crohn's disease, ostomy. She continues to follow with GI. On biologic. Continues on antibiotics due to discharge from ostomy. She is on Flagyl and Cipro. A right renal mass had been found. She is following with urology. She had an ablation of the renal mass biopsy twice. Due to multiple conditions she is high risk for surgery so his surgery will not be done for now. She will have periodic imaging of the area done every couple months for now, and then yearly after 5 years. Reviewed on care everywhere. She is on TPN which she receives every 4 days. The other days she eats regularly. Follows with dietitian from [address]   Whose name is Camelia Barfield. She gets labs monthly and regulates her TPN. Follows with dietitian whose name is Karyle Milling at [facility], and another dietitian whose name is Katelyn. They are working with her to lose weight. 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 Currently trying to lose weight to qualify for surgical correction. Due to her history of Crohn's disease she is in chronic pain and follow-up with chronic pain doctor. They prescribed MS Contin ER 8 milligrams 3 times daily and as needed 6 mg MS Contin as well. She has history of diabetes due to blockage of pancreatic duct.   Her glucose has been well controlled off of insulin and has not used insulin in a while. For example recent a.m. glucose values were 60 and 82. She has a blockage between fistula and ostomy. Currently has soft blood in her ostomy bag. But for now it is unable to be explored but she gets surgery. History of low vitamin D she is currently on supplementation. Prior to Admission medications    Medication Sig Start Date End Date Taking? Authorizing Provider   potassium chloride SA (MICRO-K) 10 mEq capsule potassium chloride ER 10 mEq capsule,extended release   take 1 capsule by mouth every morning WHILE ON LASIX    Provider, Historical   clonazePAM (KlonoPIN) 0.5 mg tablet take 1 tablet by mouth twice a day if needed for anxiety maximum daily dose of 2 tablets 7/16/21   Antonio Hooks MD   heparin, porcine, 100 unit/mL injection 300 Units by IntraVENous route daily. 6/11/21   Antonio Hooks MD   morphine CR (MS CONTIN) 60 mg CR tablet Take 60 mg by mouth every eight (8) hours. 6/11/21   Antonio Hooks MD   morphine CR (MS CONTIN) 30 mg CR tablet Take 30 mg by mouth every eight (8) hours as needed for Pain. max of 3 pills daiy  6/11/21   [place] MD   magnesium oxide 500 mg tab Take 1 Tablet by mouth daily. 6/11/21 8/20/21  Antonio Hooks MD   nystatin (MYCOSTATIN) powder Apply to the affected areas (peristomal skin) 2 to 3 times daily  Patient taking differently: Apply 100,000 Units to affected area three (3) times daily. Apply . 5 -1 g to the affected areas (peristomal skin) 2 to 3 times daily  Indications: a skin infection due to the fungus Luisa 2/4/21   Antonio Hooks MD   Humira,CF, Pen 40 mg/0.4 mL injection pen 40 mg by SubCUTAneous route every seven (7) days.  per subsequtaneous injection Once a week  10/20/20   Provider, Historical   naloxone (Narcan) 4 mg/actuation nasal spray Narcan 4 mg/actuation nasal spray (spray 1 actuation via nasal for opiod overdose)  12/17/20   Provider, Historical   metroNIDAZOLE (FlagyL) 500 mg tablet Take 500 mg by mouth three (3) times daily. 9/30/20   Jacinto Samaniego MD   ciprofloxacin HCl (Cipro) 500 mg tablet Take 500 mg by mouth two (2) times a day. 9/30/20   Jacinto Samaniego MD   predniSONE (DELTASONE) 5 mg tablet Take 5 mg by mouth nightly. Provider, Historical   ondansetron (ZOFRAN ODT) 8 mg disintegrating tablet Take 8 mg by mouth every eight (8) hours as needed for Nausea. 8/20/21  Provider, Historical   potassium chloride SR (KLOR-CON 10) 10 mEq tablet Take 10 mEq by mouth daily. 4/13/20 8/20/21  Alexi Romeo MD   cholecalciferol (VITAMIN D3) (1000 Units /25 mcg) tablet Take 5,000 Units by mouth daily. Provider, Historical   furosemide (LASIX) 20 mg tablet Take 20 mg by mouth daily. TAKE AS NEED FOR FLUID RETENTION OF 3 POUNDS OR MORE UNTIL RETURN TO BASELINE WEIGHT    Provider, Historical   insulin NPH (HUMULIN N) 100 unit/mL (3 mL) inpn 35 Units by SubCUTAneous route every morning.  8/20/21  Provider, Historical   insulin NPH (HUMULIN N) 100 unit/mL (3 mL) inpn 40 Units by SubCUTAneous route every evening.  8/20/21  Provider, Historical   acetaminophen-caffeine 500-65 mg (EXCEDRINE TENSION HEADACHE) 500-65 mg tab Take 1 Tab by mouth every eight (8) hours as needed for Headache. Provider, Historical   azaTHIOprine (IMURAN) 50 mg tablet Take 150 mg by mouth daily. Provider, Historical   mv-min-C-glutamin-lysine-hb124 (IMMUNE SUPPORT) 250-12.5 mg chew Take 1 Tab by mouth daily. Provider, Historical   aspirin (ASPIRIN) 325 mg tablet Take 325 mg by mouth daily. 8/15/19   Yony Nieto MD   TPN ADULT - CENTRAL 2,000 mL by IntraVENous route (central line) two (2) times a week. over 12 hours. Taper infusion rate up 1 hr/down1 hr    Jeremiah Urban MD   omeprazole (PRILOSEC) 20 mg capsule Take 20 mg by mouth daily. 8/13/18   Yony Nieto MD   sodium chloride (NORMAL SALINE FLUSH) 10-20 mL by IntraVENous Push route daily.  before and after TPN and/or labs    Provider, Historical   dronabinol (MARINOL) 5 mg capsule Take 1 Cap by mouth two (2) times daily as needed (appetite). Max Daily Amount: 10 mg. Patient taking differently: Take 1 Cap by mouth every six (6) hours as needed for Nausea. 6/12/18   Chad Cooper MD   ferrous sulfate 325 mg (65 mg iron) tablet Take 1 Tab by mouth Daily (before breakfast). 6/12/18   Chad Cooper MD   promethazine (PHENERGAN) 25 mg tablet Take 25 mg by mouth every six (6) hours as needed for Nausea. Provider, Historical   VITAMIN B-12 5,000 mcg subl Take 5,000 mcg by mouth every month. 10/30/17   Provider, Historical   insulin lispro (HUMALOG) 100 unit/mL injection As instructed on discharge instructions  Patient taking differently: by SubCUTAneous route two (2) times a day. ~ 2 HRS AFTER START OF TPN CHECK BS: (THIS IS ALSO THE  SS SCHEDULE)  SS:  2 UNITS -249  3 UNITS -299  4 UNITS -349  IF GREATER THAN 350 CALL MD      ~1 HR AFTER TPN COMPLETION CHECK BS:  SS:  2 UNITS -199  3 UNITS -249  5 UNITS -299  7 UNITS -349  CALL MD FOR GREATER THAN 350 10/27/17 8/20/21  Myriam Ernandez MD     Allergies   Allergen Reactions    Sulfa (Sulfonamide Antibiotics) Anaphylaxis    Demerol [Meperidine] Rash    Soma [Carisoprodol] Rash and Nausea Only    Toradol [Ketorolac Tromethamine] Nausea and Vomiting     Patient Active Problem List    Diagnosis    Secondary diabetes ([address]     Due to blocked pancreatic duct. BS now well controlled. Resolved?  Pure hypercholesterolemia    Right renal mass     Follows with urology oncology      Intestinal infection     On chronic Cipro and Flagyl for this.       Vitamin D deficiency    Cyst of left kidney    Chronic anemia    Small bowel fistula     Follow-up with GI, and surgery      Morbid obesity ([address]    Wound, open, anterior abdominal wall    Enterocutaneous fistula     Follow-up with GI, and surgery      Abdominal pain     Follows with Pain doctor      Perforated bowel ([facility]    Crohn's disease ([facility]     Follows with GI      Incarcerated ventral hernia     History of           ROS    Objective:   Vital Signs: (As obtained by patient/caregiver at home)  There were no vitals taken for this visit. [INSTRUCTIONS:  \"[x]\" Indicates a positive item  \"[]\" Indicates a negative item  -- DELETE ALL ITEMS NOT EXAMINED]    Constitutional: [x] Appears well-developed and well-nourished [x] No apparent distress      [] Abnormal -     Mental status: [x] Alert and awake  [x] Oriented to person/place/time [x] Able to follow commands    [] Abnormal -     Eyes:   EOM    [x]  Normal    [] Abnormal -   Sclera  [x]  Normal    [] Abnormal -          Discharge [x]  None visible   [] Abnormal -     HENT: [x] Normocephalic, atraumatic  [] Abnormal -   [x] Mouth/Throat: Mucous membranes are moist    External Ears [x] Normal  [] Abnormal -    Neck: [x] No visualized mass [] Abnormal -     Pulmonary/Chest: [x] Respiratory effort normal   [x] No visualized signs of difficulty breathing or respiratory distress        [] Abnormal -        Neurological:        [x] No Facial Asymmetry (Cranial nerve 7 motor function) (limited exam due to video visit)          [x] No gaze palsy        [] Abnormal -          Skin:        [x] No significant exanthematous lesions or discoloration noted on facial skin         [] Abnormal -            Psychiatric:       [x] Normal Affect [] Abnormal -        [x] No Hallucinations    Other pertinent observable physical exam findings:-              Assessment & Plan:   Diagnoses and all orders for this visit:    1. Crohn's disease of small intestine with other complication ([facility]    2. Small bowel fistula    3. Vitamin D deficiency    4. Generalized abdominal pain    5. Chronic anemia    6. Pure hypercholesterolemia    7. Right renal mass    8. Enterocutaneous fistula    9. Intestinal infection    10. Acquired hypothyroidism    11.  Anxiety    Plan: Continue current management of chronic conditions. Discussed multiple chronic conditions as stated above in the HPI. Reviewed multiple notes from multiple specialist in [facility]. Reviewed all medications with patient and labeled as taking or not taking myself. Follow-up and Dispositions    · Return in about 6 months (around 2/20/2022) for Wellness. We discussed the expected course, resolution and complications of the diagnosis(es) in detail. Medication risks, benefits, costs, interactions, and alternatives were discussed as indicated. I advised her to contact the office if her condition worsens, changes or fails to improve as anticipated. She expressed understanding with the diagnosis(es) and plan. Rosario Cuevas is a 39 y.o. female being evaluated by a video visit encounter for concerns as above. A caregiver was present when appropriate. Due to this being a TeleHealth encounter (During CKJRT-69 public health emergency), evaluation of the following organ systems was limited: Vitals/Constitutional/EENT/Resp/CV/GI//MS/Neuro/Skin/Heme-Lymph-Imm. Pursuant to the emergency declaration under the [facility] waiver authority and the [employer] Act, this Virtual  Visit was conducted, with patient's (and/or legal guardian's) consent, to reduce the patient's risk of exposure to COVID-19 and provide necessary medical care. Services were provided through a video synchronous discussion virtually to substitute for in-person clinic visit. Patient and provider were located at their individual homes.         Chapis Munoz MD

## 2021-08-20 NOTE — PROGRESS NOTES
Chief Complaint   Patient presents with    Follow Up Chronic Condition    Labs     Review results with pt.     1. Have you been to the ER, urgent care clinic since your last visit? Hospitalized since your last visit? No    2. Have you seen or consulted any other health care providers outside of the [address] [place] [address] since your last visit? Include any pap smears or colon screening. Yes Routine visits.

## 2021-08-21 VITALS
OXYGEN SATURATION: 99 % | RESPIRATION RATE: 18 BRPM | SYSTOLIC BLOOD PRESSURE: 136 MMHG | DIASTOLIC BLOOD PRESSURE: 84 MMHG | HEART RATE: 83 BPM | SYSTOLIC BLOOD PRESSURE: 124 MMHG | DIASTOLIC BLOOD PRESSURE: 75 MMHG | OXYGEN SATURATION: 99 % | RESPIRATION RATE: 18 BRPM | HEART RATE: 80 BPM | TEMPERATURE: 97.9 F | TEMPERATURE: 98 F

## 2021-08-23 ENCOUNTER — HOME CARE VISIT (OUTPATIENT)
Dept: SCHEDULING | Facility: HOME HEALTH | Age: 46
End: 2021-08-23
Payer: MEDICARE

## 2021-08-23 ENCOUNTER — TELEPHONE (OUTPATIENT)
Dept: SURGERY | Age: 46
End: 2021-08-23

## 2021-08-23 PROCEDURE — G0156 HHCP-SVS OF AIDE,EA 15 MIN: HCPCS

## 2021-08-23 NOTE — TELEPHONE ENCOUNTER
Pt called about appt tomorrow. Says she wants to have the appt virtually. Wants to make sure this appt will allow her to have Rajni sign her supply list through [address] [place] [address]. Requests a call back about both of these things.

## 2021-08-24 ENCOUNTER — HOME CARE VISIT (OUTPATIENT)
Dept: SCHEDULING | Facility: HOME HEALTH | Age: 46
End: 2021-08-24
Payer: MEDICARE

## 2021-08-24 ENCOUNTER — OFFICE VISIT (OUTPATIENT)
Dept: SURGERY | Age: 46
End: 2021-08-24
Payer: COMMERCIAL

## 2021-08-24 VITALS
WEIGHT: 254 LBS | SYSTOLIC BLOOD PRESSURE: 112 MMHG | BODY MASS INDEX: 43.36 KG/M2 | DIASTOLIC BLOOD PRESSURE: 72 MMHG | HEART RATE: 122 BPM | HEIGHT: 64 IN | RESPIRATION RATE: 20 BRPM | TEMPERATURE: 98 F | OXYGEN SATURATION: 98 %

## 2021-08-24 VITALS
TEMPERATURE: 97.7 F | SYSTOLIC BLOOD PRESSURE: 123 MMHG | HEART RATE: 73 BPM | DIASTOLIC BLOOD PRESSURE: 71 MMHG | OXYGEN SATURATION: 99 %

## 2021-08-24 VITALS
OXYGEN SATURATION: 97 % | TEMPERATURE: 97.6 F | RESPIRATION RATE: 17 BRPM | DIASTOLIC BLOOD PRESSURE: 73 MMHG | SYSTOLIC BLOOD PRESSURE: 110 MMHG | HEART RATE: 85 BPM

## 2021-08-24 DIAGNOSIS — T81.30XS ABDOMINAL WOUND DEHISCENCE, SEQUELA: Primary | ICD-10-CM

## 2021-08-24 PROCEDURE — 99024 POSTOP FOLLOW-UP VISIT: CPT | Performed by: SURGERY

## 2021-08-24 PROCEDURE — G0152 HHCP-SERV OF OT,EA 15 MIN: HCPCS

## 2021-08-24 PROCEDURE — G0157 HHC PT ASSISTANT EA 15: HCPCS

## 2021-08-25 ENCOUNTER — HOME CARE VISIT (OUTPATIENT)
Dept: SCHEDULING | Facility: HOME HEALTH | Age: 46
End: 2021-08-25
Payer: MEDICARE

## 2021-08-25 PROCEDURE — A6250 SKIN SEAL PROTECT MOISTURIZR: HCPCS

## 2021-08-25 PROCEDURE — G0300 HHS/HOSPICE OF LPN EA 15 MIN: HCPCS

## 2021-08-26 ENCOUNTER — HOME CARE VISIT (OUTPATIENT)
Dept: SCHEDULING | Facility: HOME HEALTH | Age: 46
End: 2021-08-26
Payer: MEDICARE

## 2021-08-26 VITALS
DIASTOLIC BLOOD PRESSURE: 60 MMHG | HEART RATE: 80 BPM | OXYGEN SATURATION: 98 % | TEMPERATURE: 97.6 F | SYSTOLIC BLOOD PRESSURE: 112 MMHG

## 2021-08-26 VITALS
HEART RATE: 72 BPM | OXYGEN SATURATION: 99 % | TEMPERATURE: 98.2 F | SYSTOLIC BLOOD PRESSURE: 100 MMHG | DIASTOLIC BLOOD PRESSURE: 60 MMHG | RESPIRATION RATE: 17 BRPM

## 2021-08-26 PROCEDURE — G0152 HHCP-SERV OF OT,EA 15 MIN: HCPCS

## 2021-08-26 PROCEDURE — G0156 HHCP-SVS OF AIDE,EA 15 MIN: HCPCS

## 2021-08-26 PROCEDURE — G0157 HHC PT ASSISTANT EA 15: HCPCS

## 2021-08-27 ENCOUNTER — HOME CARE VISIT (OUTPATIENT)
Dept: SCHEDULING | Facility: HOME HEALTH | Age: 46
End: 2021-08-27
Payer: MEDICARE

## 2021-08-27 VITALS
DIASTOLIC BLOOD PRESSURE: 78 MMHG | TEMPERATURE: 97.8 F | OXYGEN SATURATION: 99 % | OXYGEN SATURATION: 99 % | WEIGHT: 253 LBS | RESPIRATION RATE: 18 BRPM | SYSTOLIC BLOOD PRESSURE: 118 MMHG | TEMPERATURE: 97.8 F | BODY MASS INDEX: 43.43 KG/M2 | RESPIRATION RATE: 18 BRPM | HEART RATE: 80 BPM | HEART RATE: 88 BPM | DIASTOLIC BLOOD PRESSURE: 70 MMHG | SYSTOLIC BLOOD PRESSURE: 112 MMHG

## 2021-08-27 PROCEDURE — G0300 HHS/HOSPICE OF LPN EA 15 MIN: HCPCS

## 2021-08-30 ENCOUNTER — HOME CARE VISIT (OUTPATIENT)
Dept: SCHEDULING | Facility: HOME HEALTH | Age: 46
End: 2021-08-30
Payer: MEDICARE

## 2021-08-30 VITALS
OXYGEN SATURATION: 99 % | TEMPERATURE: 97 F | HEART RATE: 78 BPM | SYSTOLIC BLOOD PRESSURE: 101 MMHG | DIASTOLIC BLOOD PRESSURE: 71 MMHG

## 2021-08-30 PROCEDURE — G0156 HHCP-SVS OF AIDE,EA 15 MIN: HCPCS

## 2021-08-30 PROCEDURE — G0157 HHC PT ASSISTANT EA 15: HCPCS

## 2021-08-31 ENCOUNTER — HOSPITAL ENCOUNTER (OUTPATIENT)
Dept: CARDIAC REHAB | Age: 46
Discharge: HOME | End: 2021-08-31
Payer: MEDICARE

## 2021-08-31 ENCOUNTER — HOME CARE VISIT (OUTPATIENT)
Dept: SCHEDULING | Facility: HOME HEALTH | Age: 46
End: 2021-08-31
Payer: MEDICARE

## 2021-08-31 VITALS — BODY MASS INDEX: 43.53 KG/M2 | WEIGHT: 253.6 LBS

## 2021-08-31 PROCEDURE — 97803 MED NUTRITION INDIV SUBSEQ: CPT | Performed by: DIETITIAN, REGISTERED

## 2021-08-31 PROCEDURE — G0300 HHS/HOSPICE OF LPN EA 15 MIN: HCPCS

## 2021-08-31 NOTE — TELEMEDICINE
Razia Grewal was informed of the inherent limitations of a virtual visit,  and has consented to a virtual therapy visit on 2021. Information regarding emergency contact information for this patient during this visit is to contact:  call 911. The patient was informed that at any time during the virtual visit, they can decide to stop the virtual visit. The patient verified that they are physically located in the [facility] for this virtual visit. ALEJANDRO Leo [facility] - Follow Up Nutrition Counseling  2021    Referring Physician/Surgeon: Dr. Christy Gonzalez  Name: Razia Grewal : 1975 Age: 39 y.o. Gender: female  Reason for visit: Class 3 severe obesity follow-up    ASSESSMENT:  Refer to initial note 21 for full med /surg history & social history    Medications/Supplements:   Prior to Admission medications    Medication Sig Start Date End Date Taking? Authorizing Provider   potassium chloride SA (MICRO-K) 10 mEq capsule potassium chloride ER 10 mEq capsule,extended release   take 1 capsule by mouth every morning WHILE ON LASIX    Provider, Rayna   clonazePAM (KlonoPIN) 0.5 mg tablet take 1 tablet by mouth twice a day if needed for anxiety maximum daily dose of 2 tablets 21   Noah Mims MD   heparin, porcine, 100 unit/mL injection 300 Units by IntraVENous route daily. 21   Noah Mims MD   morphine CR (MS CONTIN) 60 mg CR tablet Take 60 mg by mouth every eight (8) hours. 21   Noah Mims MD   morphine CR (MS CONTIN) 30 mg CR tablet Take 30 mg by mouth every eight (8) hours as needed for Pain. max of 3 pills daiy  21   Pham Schaefer MD   nystatin (MYCOSTATIN) powder Apply to the affected areas (peristomal skin) 2 to 3 times daily  Patient taking differently: Apply 100,000 Units to affected area three (3) times daily. Apply . 5 -1 g to the affected areas (peristomal skin) 2 to 3 times daily  Indications: a skin infection due to the fungus Luisa 2/4/21   MD Poly Delarosaira,CF, Pen 40 mg/0.4 mL injection pen 40 mg by SubCUTAneous route every seven (7) days. per subsequtaneous injection Once a week  10/20/20   Provider, Historical   naloxone (Narcan) 4 mg/actuation nasal spray Narcan 4 mg/actuation nasal spray (spray 1 actuation via nasal for opiod overdose)  12/17/20   Provider, Historical   metroNIDAZOLE (FlagyL) 500 mg tablet Take 500 mg by mouth three (3) times daily. 9/30/20   Kenyon Hudson MD   ciprofloxacin HCl (Cipro) 500 mg tablet Take 500 mg by mouth two (2) times a day. 9/30/20   Kenyon Hudson MD   predniSONE (DELTASONE) 5 mg tablet Take 5 mg by mouth nightly. Provider, Historical   cholecalciferol (VITAMIN D3) (1000 Units /25 mcg) tablet Take 5,000 Units by mouth daily. Provider, Historical   furosemide (LASIX) 20 mg tablet Take 20 mg by mouth daily. TAKE AS NEED FOR FLUID RETENTION OF 3 POUNDS OR MORE UNTIL RETURN TO BASELINE WEIGHT    Provider, Historical   acetaminophen-caffeine 500-65 mg (EXCEDRINE TENSION HEADACHE) 500-65 mg tab Take 1 Tab by mouth every eight (8) hours as needed for Headache. Provider, Historical   azaTHIOprine (IMURAN) 50 mg tablet Take 150 mg by mouth daily. Provider, Historical   mv-min-C-glutamin-lysine-hb124 (IMMUNE SUPPORT) 250-12.5 mg chew Take 1 Tab by mouth daily. Provider, Historical   aspirin (ASPIRIN) 325 mg tablet Take 325 mg by mouth daily. 8/15/19   Ramses Randall MD   TPN ADULT - CENTRAL 2,000 mL by IntraVENous route (central line) two (2) times a week. over 12 hours. Taper infusion rate up 1 hr/down1 hr    Pam Urban MD   omeprazole (PRILOSEC) 20 mg capsule Take 20 mg by mouth daily. 8/13/18   Ramses Randall MD   sodium chloride (NORMAL SALINE FLUSH) 10-20 mL by IntraVENous Push route daily. before and after TPN and/or labs    Provider, Historical   dronabinol (MARINOL) 5 mg capsule Take 1 Cap by mouth two (2) times daily as needed (appetite).  Max Daily Amount: 10 mg.  Patient taking differently: Take 1 Cap by mouth every six (6) hours as needed for Nausea. 6/12/18   Yony Nieto MD   ferrous sulfate 325 mg (65 mg iron) tablet Take 1 Tab by mouth Daily (before breakfast). 6/12/18   Yony Nieto MD   promethazine (PHENERGAN) 25 mg tablet Take 25 mg by mouth every six (6) hours as needed for Nausea. Provider, Historical   VITAMIN B-12 5,000 mcg subl Take 5,000 mcg by mouth every month. 10/30/17   Provider, Historical       Pertinent Labs: Anthropometrics:    Ht Readings from Last 1 Encounters:   08/24/21 5' 4\" (1.626 m)       Wt Readings from Last 10 Encounters:   08/31/21 115 kg (253 lb 9.6 oz)   08/27/21 114.8 kg (253 lb)   08/24/21 115.2 kg (254 lb)   08/13/21 115.9 kg (255 lb 9.6 oz)   06/11/21 116.1 kg (256 lb)   05/07/21 121.4 kg (267 lb 9.6 oz)   11/02/20 114.3 kg (252 lb)   08/03/20 118.8 kg (262 lb)   07/13/20 115.7 kg (255 lb)   11/25/19 122.1 kg (269 lb 2 oz)      Weight Change from Initial Consult: - 14 lbs      Exercise/Physical Actvity: walking to stop sign a couple times per week; dependent on others to walk with her for stability / safety concerns; receives PT and OT twice weekly    Estimated Nutritional Needs:  [id number] for weight loss at rate of approx 2 lbs per week ([address] with AF 1.2 and IF 1.2 d/t Crohn's & fistula; minus [phone] for weight loss)       93 grams protein / day (0.8 g pro / kg BW) or  g protein / day (15-30% calories from protein)      Diet History: Has not kept a food diary. She is trying to cook for herself after her mother's passing, but her aunts and friends cook for her also. She tried and disliked Ensure Complete but willing to make her own protein shakes. She is generally eating one meal a day. Yesterday had pc of flatbread with peanut butter, a few banana slices and honey, a small smoothie made with Splenda from [employer], and a snack of Special K 100 calorie crisps.  She is eating cauliflower rice with chicken and enjoying it. She is rarely having soda, states only drinking water. Note: TPN provides 672 calories twice weekly      INITIAL NUTRITION  DIAGNOSIS:  Obesity related to food preferences and nutrition related knowledge deficit as evidenced by pt request for RD education and counseling and BMI>40. NUTRITION INTERVENTION:    30 minute individual telehealth consult with Aislinn Bryant. Reviewed goals set at last visit and progress made / not made towards goals. Assessed for any real or perceived barriers to obtaining goals. Revised / updated goals. Provided easy recipe ideas for proteins and smoothies. PATIENT GOALS:    Nutrition Goals:  - continue to drink only diet sodas, Propel and unsweetened / diet teas vs sugar-sweetened beverages  - continue to choose \"light\" and \"reduced\" calorie / fat options (though check labels to avoid any sugar-free products containing sugar alcohols)  - include a good source of protein at every meal and most if not all snacks (low fat cheese, [place] yogurt, eggs, peanut butter, chicken, turkey) and / or drink an Enusre Complete or add a protein powder  - eat / drink multiple small meals a day    Exercise/ Activity Goals:  - continue to increase physical activity; get up every 30 minutes and move your body in addition to daily walks    Other:  - keep a detailed food diary using the [employer]; suggested daily calorie intake 1600 calories / day but twice a week only [phone] [id number] calories from food as TPN will provide 672 calories;  pay attention to daily protein intake as well (aim for at least 60 grams per day). Follow-up in 4 weeks        Specific tips and techniques to facilitate compliance with above recommendations were provided and discussed. If further details are desired please feel free to contact me at [phone]. This phone number was also provided to the patient for any further questions or concerns.             Orly Barron, KIA    Razia Grewal is a 39 y.o. female being evaluated by a Virtual Visit (video visit) encounter to address concerns as mentioned above. A caregiver was present when appropriate. Due to this being a TeleHealth encounter (During ZELJK-46 public health emergency), evaluation of the following areas was limited: Nutrition Focused Physical Exam. Pursuant to the emergency declaration under the [address] [place] [address] and the [employer] and [employer] Act, this Virtual Visit was conducted with patient's (and/or legal guardian's) consent, to reduce the risk of exposure to COVID-19 and provide necessary medical care. Services were provided through a video synchronous discussion virtually to substitute for in-person encounter. --Nadiya Pete RD on 8/31/2021 at 1:01 PM    An electronic signature was used to authenticate this note.

## 2021-09-01 ENCOUNTER — HOME CARE VISIT (OUTPATIENT)
Dept: SCHEDULING | Facility: HOME HEALTH | Age: 46
End: 2021-09-01
Payer: MEDICARE

## 2021-09-01 VITALS
DIASTOLIC BLOOD PRESSURE: 47 MMHG | SYSTOLIC BLOOD PRESSURE: 98 MMHG | TEMPERATURE: 97.3 F | OXYGEN SATURATION: 100 % | HEART RATE: 65 BPM

## 2021-09-01 PROCEDURE — G0157 HHC PT ASSISTANT EA 15: HCPCS

## 2021-09-01 PROCEDURE — G0156 HHCP-SVS OF AIDE,EA 15 MIN: HCPCS

## 2021-09-02 ENCOUNTER — HOME CARE VISIT (OUTPATIENT)
Dept: SCHEDULING | Facility: HOME HEALTH | Age: 46
End: 2021-09-02
Payer: MEDICARE

## 2021-09-02 VITALS
OXYGEN SATURATION: 98 % | DIASTOLIC BLOOD PRESSURE: 65 MMHG | SYSTOLIC BLOOD PRESSURE: 100 MMHG | TEMPERATURE: 97.5 F | HEART RATE: 70 BPM

## 2021-09-02 PROCEDURE — G0300 HHS/HOSPICE OF LPN EA 15 MIN: HCPCS

## 2021-09-02 PROCEDURE — G0152 HHCP-SERV OF OT,EA 15 MIN: HCPCS

## 2021-09-05 VITALS
RESPIRATION RATE: 18 BRPM | TEMPERATURE: 97.5 F | SYSTOLIC BLOOD PRESSURE: 100 MMHG | DIASTOLIC BLOOD PRESSURE: 65 MMHG | OXYGEN SATURATION: 98 % | HEART RATE: 70 BPM | WEIGHT: 250 LBS | BODY MASS INDEX: 42.91 KG/M2

## 2021-09-05 VITALS
RESPIRATION RATE: 18 BRPM | SYSTOLIC BLOOD PRESSURE: 124 MMHG | DIASTOLIC BLOOD PRESSURE: 72 MMHG | BODY MASS INDEX: 42.91 KG/M2 | OXYGEN SATURATION: 99 % | TEMPERATURE: 98.3 F | WEIGHT: 250 LBS | HEART RATE: 88 BPM

## 2021-09-07 ENCOUNTER — HOME CARE VISIT (OUTPATIENT)
Dept: SCHEDULING | Facility: HOME HEALTH | Age: 46
End: 2021-09-07
Payer: MEDICARE

## 2021-09-07 VITALS
SYSTOLIC BLOOD PRESSURE: 107 MMHG | OXYGEN SATURATION: 99 % | TEMPERATURE: 97.3 F | DIASTOLIC BLOOD PRESSURE: 73 MMHG | HEART RATE: 58 BPM

## 2021-09-07 PROCEDURE — G0157 HHC PT ASSISTANT EA 15: HCPCS

## 2021-09-08 ENCOUNTER — HOME CARE VISIT (OUTPATIENT)
Dept: SCHEDULING | Facility: HOME HEALTH | Age: 46
End: 2021-09-08
Payer: MEDICARE

## 2021-09-08 VITALS
HEART RATE: 128 BPM | OXYGEN SATURATION: 97 % | SYSTOLIC BLOOD PRESSURE: 91 MMHG | TEMPERATURE: 99.2 F | DIASTOLIC BLOOD PRESSURE: 58 MMHG

## 2021-09-08 PROCEDURE — G0156 HHCP-SVS OF AIDE,EA 15 MIN: HCPCS

## 2021-09-08 PROCEDURE — G0300 HHS/HOSPICE OF LPN EA 15 MIN: HCPCS

## 2021-09-08 PROCEDURE — G0152 HHCP-SERV OF OT,EA 15 MIN: HCPCS

## 2021-09-09 ENCOUNTER — HOME CARE VISIT (OUTPATIENT)
Dept: SCHEDULING | Facility: HOME HEALTH | Age: 46
End: 2021-09-09
Payer: MEDICARE

## 2021-09-09 VITALS
TEMPERATURE: 97.8 F | HEART RATE: 63 BPM | DIASTOLIC BLOOD PRESSURE: 66 MMHG | SYSTOLIC BLOOD PRESSURE: 99 MMHG | OXYGEN SATURATION: 100 %

## 2021-09-09 LAB
ALBUMIN SERPL-MCNC: 2.8 G/DL (ref 3.8–4.8)
ALBUMIN/GLOB SERPL: 1 {RATIO} (ref 1.2–2.2)
ALP SERPL-CCNC: 122 IU/L (ref 48–121)
ALT SERPL-CCNC: 12 IU/L (ref 0–32)
AST SERPL-CCNC: 13 IU/L (ref 0–40)
BASOPHILS # BLD AUTO: 0 X10E3/UL (ref 0–0.2)
BASOPHILS NFR BLD AUTO: 0 %
BILIRUB SERPL-MCNC: 1.1 MG/DL (ref 0–1.2)
BUN SERPL-MCNC: 20 MG/DL (ref 6–24)
BUN/CREAT SERPL: 26 (ref 9–23)
CALCIUM SERPL-MCNC: 8.4 MG/DL (ref 8.7–10.2)
CHLORIDE SERPL-SCNC: 96 MMOL/L (ref 96–106)
CO2 SERPL-SCNC: 26 MMOL/L (ref 20–29)
CREAT SERPL-MCNC: 0.78 MG/DL (ref 0.57–1)
EOSINOPHIL # BLD AUTO: 0.4 X10E3/UL (ref 0–0.4)
EOSINOPHIL NFR BLD AUTO: 3 %
ERYTHROCYTE [DISTWIDTH] IN BLOOD BY AUTOMATED COUNT: 14 % (ref 11.7–15.4)
GLOBULIN SER CALC-MCNC: 2.7 G/DL (ref 1.5–4.5)
GLUCOSE SERPL-MCNC: 143 MG/DL (ref 65–99)
HCT VFR BLD AUTO: 38.2 % (ref 34–46.6)
HGB BLD-MCNC: 12.8 G/DL (ref 11.1–15.9)
IMMATURE CELLS, 115398: ABNORMAL
LYMPHOCYTES # BLD AUTO: 3.7 X10E3/UL (ref 0.7–3.1)
LYMPHOCYTES NFR BLD AUTO: 26 %
MAGNESIUM SERPL-MCNC: 2 MG/DL (ref 1.6–2.3)
MCH RBC QN AUTO: 32.3 PG (ref 26.6–33)
MCHC RBC AUTO-ENTMCNC: 33.5 G/DL (ref 31.5–35.7)
MCV RBC AUTO: 97 FL (ref 79–97)
MONOCYTES # BLD AUTO: 0.9 X10E3/UL (ref 0.1–0.9)
MONOCYTES NFR BLD AUTO: 6 %
MORPHOLOGY BLD-IMP: ABNORMAL
NEUTROPHILS # BLD AUTO: 9.2 X10E3/UL (ref 1.4–7)
NEUTROPHILS NFR BLD AUTO: 62 %
NEUTS BAND NFR BLD AUTO: 3 %
PHOSPHATE SERPL-MCNC: 3.2 MG/DL (ref 3–4.3)
PLATELET # BLD AUTO: 249 X10E3/UL (ref 150–450)
POTASSIUM SERPL-SCNC: 3.8 MMOL/L (ref 3.5–5.2)
PREALB SERPL-MCNC: 34 MG/DL (ref 12–34)
PROT SERPL-MCNC: 5.5 G/DL (ref 6–8.5)
RBC # BLD AUTO: 3.96 X10E6/UL (ref 3.77–5.28)
SODIUM SERPL-SCNC: 136 MMOL/L (ref 134–144)
TRIGL SERPL-MCNC: 105 MG/DL (ref 0–149)
WBC # BLD AUTO: 14.2 X10E3/UL (ref 3.4–10.8)

## 2021-09-09 PROCEDURE — G0151 HHCP-SERV OF PT,EA 15 MIN: HCPCS

## 2021-09-09 PROCEDURE — G0152 HHCP-SERV OF OT,EA 15 MIN: HCPCS

## 2021-09-10 ENCOUNTER — HOME CARE VISIT (OUTPATIENT)
Dept: HOME HEALTH SERVICES | Facility: HOME HEALTH | Age: 46
End: 2021-09-10
Payer: MEDICARE

## 2021-09-10 ENCOUNTER — HOME CARE VISIT (OUTPATIENT)
Dept: SCHEDULING | Facility: HOME HEALTH | Age: 46
End: 2021-09-10
Payer: MEDICARE

## 2021-09-10 VITALS
SYSTOLIC BLOOD PRESSURE: 111 MMHG | HEART RATE: 67 BPM | DIASTOLIC BLOOD PRESSURE: 71 MMHG | RESPIRATION RATE: 18 BRPM | OXYGEN SATURATION: 97 % | TEMPERATURE: 97 F

## 2021-09-10 PROCEDURE — G0300 HHS/HOSPICE OF LPN EA 15 MIN: HCPCS

## 2021-09-13 ENCOUNTER — HOME CARE VISIT (OUTPATIENT)
Dept: SCHEDULING | Facility: HOME HEALTH | Age: 46
End: 2021-09-13
Payer: MEDICARE

## 2021-09-13 VITALS
SYSTOLIC BLOOD PRESSURE: 102 MMHG | DIASTOLIC BLOOD PRESSURE: 66 MMHG | HEART RATE: 70 BPM | TEMPERATURE: 97.4 F | OXYGEN SATURATION: 99 %

## 2021-09-13 VITALS
DIASTOLIC BLOOD PRESSURE: 76 MMHG | SYSTOLIC BLOOD PRESSURE: 111 MMHG | RESPIRATION RATE: 17 BRPM | HEART RATE: 89 BPM | TEMPERATURE: 97.1 F | OXYGEN SATURATION: 99 %

## 2021-09-13 PROCEDURE — G0152 HHCP-SERV OF OT,EA 15 MIN: HCPCS

## 2021-09-13 PROCEDURE — 400014 HH F/U

## 2021-09-13 PROCEDURE — G0156 HHCP-SVS OF AIDE,EA 15 MIN: HCPCS

## 2021-09-13 PROCEDURE — G0157 HHC PT ASSISTANT EA 15: HCPCS

## 2021-09-14 ENCOUNTER — HOME CARE VISIT (OUTPATIENT)
Dept: SCHEDULING | Facility: HOME HEALTH | Age: 46
End: 2021-09-14
Payer: MEDICARE

## 2021-09-14 VITALS
RESPIRATION RATE: 16 BRPM | DIASTOLIC BLOOD PRESSURE: 73 MMHG | HEART RATE: 80 BPM | TEMPERATURE: 96.8 F | SYSTOLIC BLOOD PRESSURE: 115 MMHG

## 2021-09-14 PROCEDURE — G0300 HHS/HOSPICE OF LPN EA 15 MIN: HCPCS

## 2021-09-15 ENCOUNTER — HOME CARE VISIT (OUTPATIENT)
Dept: SCHEDULING | Facility: HOME HEALTH | Age: 46
End: 2021-09-15
Payer: MEDICARE

## 2021-09-15 VITALS
RESPIRATION RATE: 17 BRPM | DIASTOLIC BLOOD PRESSURE: 67 MMHG | SYSTOLIC BLOOD PRESSURE: 106 MMHG | TEMPERATURE: 98.1 F | HEART RATE: 78 BPM | OXYGEN SATURATION: 97 %

## 2021-09-15 PROCEDURE — G0157 HHC PT ASSISTANT EA 15: HCPCS

## 2021-09-16 ENCOUNTER — HOME CARE VISIT (OUTPATIENT)
Dept: SCHEDULING | Facility: HOME HEALTH | Age: 46
End: 2021-09-16
Payer: MEDICARE

## 2021-09-16 VITALS — TEMPERATURE: 97.5 F

## 2021-09-16 VITALS
SYSTOLIC BLOOD PRESSURE: 111 MMHG | DIASTOLIC BLOOD PRESSURE: 72 MMHG | OXYGEN SATURATION: 97 % | TEMPERATURE: 98.4 F | HEART RATE: 106 BPM

## 2021-09-16 PROCEDURE — G0300 HHS/HOSPICE OF LPN EA 15 MIN: HCPCS

## 2021-09-16 PROCEDURE — A6250 SKIN SEAL PROTECT MOISTURIZR: HCPCS

## 2021-09-16 PROCEDURE — G0156 HHCP-SVS OF AIDE,EA 15 MIN: HCPCS

## 2021-09-16 PROCEDURE — G0152 HHCP-SERV OF OT,EA 15 MIN: HCPCS

## 2021-09-21 ENCOUNTER — HOME CARE VISIT (OUTPATIENT)
Dept: SCHEDULING | Facility: HOME HEALTH | Age: 46
End: 2021-09-21
Payer: MEDICARE

## 2021-09-21 ENCOUNTER — HOME CARE VISIT (OUTPATIENT)
Dept: HOME HEALTH SERVICES | Facility: HOME HEALTH | Age: 46
End: 2021-09-21
Payer: MEDICARE

## 2021-09-21 VITALS
SYSTOLIC BLOOD PRESSURE: 110 MMHG | OXYGEN SATURATION: 98 % | DIASTOLIC BLOOD PRESSURE: 57 MMHG | TEMPERATURE: 98 F | HEART RATE: 77 BPM

## 2021-09-21 PROCEDURE — G0152 HHCP-SERV OF OT,EA 15 MIN: HCPCS

## 2021-09-22 ENCOUNTER — HOME CARE VISIT (OUTPATIENT)
Dept: SCHEDULING | Facility: HOME HEALTH | Age: 46
End: 2021-09-22
Payer: MEDICARE

## 2021-09-22 ENCOUNTER — HOME CARE VISIT (OUTPATIENT)
Dept: HOME HEALTH SERVICES | Facility: HOME HEALTH | Age: 46
End: 2021-09-22
Payer: MEDICARE

## 2021-09-22 VITALS
HEART RATE: 68 BPM | SYSTOLIC BLOOD PRESSURE: 120 MMHG | OXYGEN SATURATION: 98 % | RESPIRATION RATE: 18 BRPM | DIASTOLIC BLOOD PRESSURE: 78 MMHG | TEMPERATURE: 97.2 F

## 2021-09-22 PROCEDURE — G0300 HHS/HOSPICE OF LPN EA 15 MIN: HCPCS

## 2021-09-22 PROCEDURE — G0156 HHCP-SVS OF AIDE,EA 15 MIN: HCPCS

## 2021-09-23 ENCOUNTER — HOME CARE VISIT (OUTPATIENT)
Dept: SCHEDULING | Facility: HOME HEALTH | Age: 46
End: 2021-09-23
Payer: MEDICARE

## 2021-09-23 PROCEDURE — G0157 HHC PT ASSISTANT EA 15: HCPCS

## 2021-09-24 ENCOUNTER — HOME CARE VISIT (OUTPATIENT)
Dept: SCHEDULING | Facility: HOME HEALTH | Age: 46
End: 2021-09-24
Payer: MEDICARE

## 2021-09-24 VITALS
OXYGEN SATURATION: 99 % | SYSTOLIC BLOOD PRESSURE: 115 MMHG | HEART RATE: 78 BPM | TEMPERATURE: 97.1 F | DIASTOLIC BLOOD PRESSURE: 78 MMHG

## 2021-09-24 PROCEDURE — G0152 HHCP-SERV OF OT,EA 15 MIN: HCPCS

## 2021-09-24 PROCEDURE — G0300 HHS/HOSPICE OF LPN EA 15 MIN: HCPCS

## 2021-09-24 PROCEDURE — G0156 HHCP-SVS OF AIDE,EA 15 MIN: HCPCS

## 2021-09-26 VITALS
DIASTOLIC BLOOD PRESSURE: 85 MMHG | OXYGEN SATURATION: 97 % | TEMPERATURE: 97.3 F | SYSTOLIC BLOOD PRESSURE: 129 MMHG | HEART RATE: 128 BPM | RESPIRATION RATE: 16 BRPM

## 2021-09-26 NOTE — CASE COMMUNICATION
Patient with missed PT visit as patient has cancelled original visit and states that today is not good for her due to nursing visit.  Will see for PT on Thursday per patient request. Patient will only have one PT visit this week per patient request.
[address] [place]

## 2021-09-27 ENCOUNTER — HOME CARE VISIT (OUTPATIENT)
Dept: SCHEDULING | Facility: HOME HEALTH | Age: 46
End: 2021-09-27
Payer: MEDICARE

## 2021-09-27 ENCOUNTER — TELEPHONE (OUTPATIENT)
Dept: FAMILY MEDICINE CLINIC | Age: 46
End: 2021-09-27

## 2021-09-27 VITALS
TEMPERATURE: 97.5 F | DIASTOLIC BLOOD PRESSURE: 76 MMHG | SYSTOLIC BLOOD PRESSURE: 115 MMHG | OXYGEN SATURATION: 98 % | HEART RATE: 73 BPM

## 2021-09-27 PROCEDURE — G0152 HHCP-SERV OF OT,EA 15 MIN: HCPCS

## 2021-09-27 PROCEDURE — G0156 HHCP-SVS OF AIDE,EA 15 MIN: HCPCS

## 2021-09-28 ENCOUNTER — HOME CARE VISIT (OUTPATIENT)
Dept: HOME HEALTH SERVICES | Facility: HOME HEALTH | Age: 46
End: 2021-09-28
Payer: MEDICARE

## 2021-09-28 PROCEDURE — G0300 HHS/HOSPICE OF LPN EA 15 MIN: HCPCS

## 2021-09-29 ENCOUNTER — HOME CARE VISIT (OUTPATIENT)
Dept: SCHEDULING | Facility: HOME HEALTH | Age: 46
End: 2021-09-29
Payer: MEDICARE

## 2021-09-29 VITALS
SYSTOLIC BLOOD PRESSURE: 108 MMHG | OXYGEN SATURATION: 99 % | DIASTOLIC BLOOD PRESSURE: 81 MMHG | RESPIRATION RATE: 18 BRPM | HEART RATE: 89 BPM | TEMPERATURE: 98 F | SYSTOLIC BLOOD PRESSURE: 124 MMHG | DIASTOLIC BLOOD PRESSURE: 76 MMHG | RESPIRATION RATE: 18 BRPM | HEART RATE: 80 BPM | OXYGEN SATURATION: 99 % | TEMPERATURE: 98.6 F

## 2021-09-29 VITALS
HEART RATE: 68 BPM | TEMPERATURE: 97.1 F | OXYGEN SATURATION: 100 % | SYSTOLIC BLOOD PRESSURE: 120 MMHG | DIASTOLIC BLOOD PRESSURE: 80 MMHG

## 2021-09-29 PROCEDURE — G0157 HHC PT ASSISTANT EA 15: HCPCS

## 2021-09-30 ENCOUNTER — HOME CARE VISIT (OUTPATIENT)
Dept: SCHEDULING | Facility: HOME HEALTH | Age: 46
End: 2021-09-30
Payer: MEDICARE

## 2021-09-30 ENCOUNTER — TELEPHONE (OUTPATIENT)
Dept: FAMILY MEDICINE CLINIC | Age: 46
End: 2021-09-30

## 2021-09-30 VITALS
SYSTOLIC BLOOD PRESSURE: 110 MMHG | DIASTOLIC BLOOD PRESSURE: 72 MMHG | OXYGEN SATURATION: 98 % | TEMPERATURE: 97.4 F | HEART RATE: 87 BPM

## 2021-09-30 PROCEDURE — G0300 HHS/HOSPICE OF LPN EA 15 MIN: HCPCS

## 2021-09-30 PROCEDURE — G0156 HHCP-SVS OF AIDE,EA 15 MIN: HCPCS

## 2021-09-30 PROCEDURE — G0152 HHCP-SERV OF OT,EA 15 MIN: HCPCS

## 2021-09-30 NOTE — TELEPHONE ENCOUNTER
Rafael Cuellar from [address] [place] [address] called requesting a verbal order to continue with her home health OT.

## 2021-09-30 NOTE — TELEPHONE ENCOUNTER
Spoke to [place] and advised her that it is fine to continue home OT for pt. She stated that they are doing this for pt once a week to get her more comfortable with the shower.

## 2021-10-01 ENCOUNTER — HOME CARE VISIT (OUTPATIENT)
Dept: SCHEDULING | Facility: HOME HEALTH | Age: 46
End: 2021-10-01
Payer: MEDICARE

## 2021-10-01 ENCOUNTER — VIRTUAL VISIT (OUTPATIENT)
Dept: FAMILY MEDICINE CLINIC | Age: 46
End: 2021-10-01
Payer: MEDICARE

## 2021-10-01 VITALS
HEART RATE: 68 BPM | DIASTOLIC BLOOD PRESSURE: 70 MMHG | SYSTOLIC BLOOD PRESSURE: 116 MMHG | TEMPERATURE: 96.8 F | RESPIRATION RATE: 18 BRPM | OXYGEN SATURATION: 97 %

## 2021-10-01 VITALS
OXYGEN SATURATION: 100 % | HEART RATE: 75 BPM | DIASTOLIC BLOOD PRESSURE: 80 MMHG | SYSTOLIC BLOOD PRESSURE: 121 MMHG | TEMPERATURE: 97.4 F

## 2021-10-01 DIAGNOSIS — T78.40XD ALLERGIC REACTION, SUBSEQUENT ENCOUNTER: Primary | ICD-10-CM

## 2021-10-01 DIAGNOSIS — T78.3XXD ANGIOEDEMA, SUBSEQUENT ENCOUNTER: ICD-10-CM

## 2021-10-01 PROCEDURE — G8432 DEP SCR NOT DOC, RNG: HCPCS | Performed by: STUDENT IN AN ORGANIZED HEALTH CARE EDUCATION/TRAINING PROGRAM

## 2021-10-01 PROCEDURE — G8427 DOCREV CUR MEDS BY ELIG CLIN: HCPCS | Performed by: STUDENT IN AN ORGANIZED HEALTH CARE EDUCATION/TRAINING PROGRAM

## 2021-10-01 PROCEDURE — G8417 CALC BMI ABV UP PARAM F/U: HCPCS | Performed by: STUDENT IN AN ORGANIZED HEALTH CARE EDUCATION/TRAINING PROGRAM

## 2021-10-01 PROCEDURE — 99213 OFFICE O/P EST LOW 20 MIN: CPT | Performed by: STUDENT IN AN ORGANIZED HEALTH CARE EDUCATION/TRAINING PROGRAM

## 2021-10-01 PROCEDURE — G0157 HHC PT ASSISTANT EA 15: HCPCS

## 2021-10-01 RX ORDER — ALBUTEROL SULFATE 90 UG/1
AEROSOL, METERED RESPIRATORY (INHALATION)
COMMUNITY
End: 2022-04-23

## 2021-10-01 RX ORDER — EPINEPHRINE 0.3 MG/.3ML
0.3 INJECTION SUBCUTANEOUS
Qty: 0.3 ML | Refills: 0 | Status: SHIPPED | OUTPATIENT
Start: 2021-10-01 | End: 2021-10-01

## 2021-10-01 NOTE — PROGRESS NOTES
Chief Complaint   Patient presents with   [facility] ED Follow-up     1. Have you been to the ER, urgent care clinic since your last visit? Hospitalized since your last visit? Yes [facility]rera for Allergic reaction on 9/24/2021    2. Have you seen or consulted any other health care providers outside of the [address] [place] [address] since your last visit? Include any pap smears or colon screening. Yes Pain management. Please send message to [phone].

## 2021-10-01 NOTE — PROGRESS NOTES
Consent: Fidelina Deutsch, who was seen by synchronous (real-time) audio-video technology, and/or her healthcare decision maker, is aware that this patient-initiated, Telehealth encounter on 10/1/2021 is a billable service, with coverage as determined by her insurance carrier. She is aware that she may receive a bill and has provided verbal consent to proceed: YES-Consent obtained within past 12 months        712  Subjective: Fidelina Deutsch is a 39 y.o. female who was seen for ED Follow-up    Patient went to ED last week due to allergic reaction. States she had tried almond milk running cashews, and within a little while began having an allergic reaction. She began having itching in her face and her body with red welts. Then her tongue began swelling. She chewed 4 Benadryl's and called EMT. She was taken to the ED at which she was given dexamethasone. On discharge she was advised to make an appoint with her PCP to have EpiPen ordered. She will like to see an allergist.  Has never had a reaction like this before. Never had shortness of breath. She was told it might be the cottonseed oil in the cashews or the almond milk that caused the allergic reaction. She is not on ACE or ARB. He is being discharged from the hospital she has not had any similar events. Patient has lost about 50 pounds and she will be seen surgeon soon to operate on her fistula. Prior to Admission medications    Medication Sig Start Date End Date Taking? Authorizing Provider   aspirin-acetaminophen-caffeine (EXCEDRIN ES) 250-250-65 mg per tablet 1 Tablet. Yes Provider, Historical   escitalopram oxalate (LEXAPRO) 10 mg tablet Take 10 mg by mouth daily.    Yes Provider, Historical   potassium chloride SA (MICRO-K) 10 mEq capsule potassium chloride ER 10 mEq capsule,extended release   take 1 capsule by mouth every morning WHILE ON LASIX   Yes Provider, Historical   clonazePAM (KlonoPIN) 0.5 mg tablet take 1 tablet by mouth twice a day if needed for anxiety maximum daily dose of 2 tablets 7/16/21  Yes Opal Hawkins MD   heparin, porcine, 100 unit/mL injection 300 Units by IntraVENous route daily. 6/11/21  Yes Opal Hawkins MD   morphine CR (MS CONTIN) 60 mg CR tablet Take 60 mg by mouth every eight (8) hours. 6/11/21  Yes Opal Hawkins MD   morphine CR (MS CONTIN) 30 mg CR tablet Take 30 mg by mouth every eight (8) hours as needed for Pain. max of 3 pills daiy  6/11/21  Yes Regino Soulier, Walden Code, MD   nystatin (MYCOSTATIN) powder Apply to the affected areas (peristomal skin) 2 to 3 times daily  Patient taking differently: Apply 100,000 Units to affected area three (3) times daily. Apply . 5 -1 g to the affected areas (peristomal skin) 2 to 3 times daily  Indications: a skin infection due to the fungus Candida 2/4/21  Yes MD Lisa Schultz,, Pen 40 mg/0.4 mL injection pen 40 mg by SubCUTAneous route every seven (7) days. per subsequtaneous injection Once a week  10/20/20  Yes Provider, Historical   naloxone (Narcan) 4 mg/actuation nasal spray Narcan 4 mg/actuation nasal spray (spray 1 actuation via nasal for opiod overdose)  12/17/20  Yes Provider, Historical   metroNIDAZOLE (FlagyL) 500 mg tablet Take 500 mg by mouth three (3) times daily. 9/30/20  Yes Tiffany Blandon MD   predniSONE (DELTASONE) 5 mg tablet Take 5 mg by mouth nightly. Yes Provider, Historical   cholecalciferol (VITAMIN D3) (1000 Units /25 mcg) tablet Take 5,000 Units by mouth daily. Yes Provider, Historical   furosemide (LASIX) 20 mg tablet Take 20 mg by mouth daily. TAKE AS NEED FOR FLUID RETENTION OF 3 POUNDS OR MORE UNTIL RETURN TO BASELINE WEIGHT   Yes Provider, Historical   acetaminophen-caffeine 500-65 mg (EXCEDRINE TENSION HEADACHE) 500-65 mg tab Take 1 Tab by mouth every eight (8) hours as needed for Headache. Yes Provider, Historical   azaTHIOprine (IMURAN) 50 mg tablet Take 150 mg by mouth daily.    Yes Provider, Historical mv-min-C-glutamin-lysine-hb124 (IMMUNE SUPPORT) 250-12.5 mg chew Take 1 Tab by mouth daily. Yes Provider, Historical   aspirin (ASPIRIN) 325 mg tablet Take 325 mg by mouth daily. 8/15/19  Yes Jeff Marlow MD   TPN ADULT - CENTRAL 2,000 mL by IntraVENous route (central line) two (2) times a week. over 12 hours. Taper infusion rate up 1 hr/down1 hr   Yes Gene Urban MD   omeprazole (PRILOSEC) 20 mg capsule Take 20 mg by mouth daily. 8/13/18  Yes Jeff Marlow MD   sodium chloride (NORMAL SALINE FLUSH) 10-20 mL by IntraVENous Push route daily. before and after TPN and/or labs   Yes Provider, Historical   dronabinol (MARINOL) 5 mg capsule Take 1 Cap by mouth two (2) times daily as needed (appetite). Max Daily Amount: 10 mg. Patient taking differently: Take 1 Cap by mouth every six (6) hours as needed for Nausea. 6/12/18  Yes Jeff Marlow MD   ferrous sulfate 325 mg (65 mg iron) tablet Take 1 Tab by mouth Daily (before breakfast). 6/12/18  Yes Jeff Marlow MD   promethazine (PHENERGAN) 25 mg tablet Take 25 mg by mouth every six (6) hours as needed for Nausea. Yes Provider, Historical   VITAMIN B-12 5,000 mcg subl Take 5,000 mcg by mouth every month. 10/30/17  Yes Provider, Historical   albuterol (PROVENTIL HFA, VENTOLIN HFA, PROAIR HFA) 90 mcg/actuation inhaler EVERY 4 HOURS AS NEEDED as needed for ALLERGIES  Patient not taking: Reported on 10/1/2021    Provider, Historical   ciprofloxacin HCl (Cipro) 500 mg tablet Take 500 mg by mouth two (2) times a day. Patient not taking: Reported on 10/1/2021 9/30/20   Jordan Gupta MD     Allergies   Allergen Reactions    Sulfa (Sulfonamide Antibiotics) Anaphylaxis    Demerol [Meperidine] Rash    Soma [Carisoprodol] Rash and Nausea Only    Toradol [Ketorolac Tromethamine] Nausea and Vomiting     Patient Active Problem List    Diagnosis    Secondary diabetes ([address]     Due to blocked pancreatic duct. BS now well controlled. Resolved?       Pure hypercholesterolemia    Right renal mass     Follows with urology oncology      Intestinal infection     On chronic Cipro and Flagyl for this.  Vitamin D deficiency    Cyst of left kidney    Chronic anemia    Small bowel fistula     Follow-up with GI, and surgery      Morbid obesity ([address]    Wound, open, anterior abdominal wall    Enterocutaneous fistula     Follow-up with GI, and surgery      Abdominal pain     Follows with Pain doctor      Perforated bowel ([address]    Crohn's disease ([address]     Follows with GI      Incarcerated ventral hernia     History of       Past Medical History:   Diagnosis Date    Adverse effect of anesthesia     WOKE DURING SURGERY    Arthritis     Blood clot in vein 2017    STOMACH    Crohn's disease ([address] 8/15/2011    Cyst of left kidney     DVT (deep venous thrombosis) ([address] 8/15/2011    LEFT LEG    Edema     GENERALIZED R/T TPN; WAIST DOWN    Incarcerated ventral hernia 8/15/2011    Lupus ([address]     Lyme disease     Psychiatric disorder     ANXIETY    Right flank pain 8/22/2011    Seizures ([address] 1990    LAST AT AGE 17    Small bowel ischemia ([address] 6/28/2017    CT abd/pelvis 6/28/17 1. The stomach and proximal small bowel loops are distended. There is a loop of small bowel in the midabdomen which is mildly dilated and does not demonstrate enhancement in the wall and there is suspicion of pneumatosis and this leads to a loop of small bowel which demonstrates thickening of the wall and findings are suspicious for ischemia. 2. 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 There is nonocclusive thrombus in the SMA.  Thyroid disease     HYPO-NO MEDS    Uncertain tumor of kidney and ureter, right        Review of Systems   All other systems reviewed and are negative.       Objective:   Vital Signs: (As obtained by patient/caregiver at home)  There were no vitals taken for this visit. [INSTRUCTIONS:  \"[x]\" Indicates a positive item  \"[]\" Indicates a negative item  -- DELETE ALL ITEMS NOT EXAMINED]    Constitutional: [x] Appears well-developed and well-nourished [x] No apparent distress      [] Abnormal -     Mental status: [x] Alert and awake  [x] Oriented to person/place/time [x] Able to follow commands    [] Abnormal -     Eyes:   EOM    [x]  Normal    [] Abnormal -   Sclera  [x]  Normal    [] Abnormal -          Discharge [x]  None visible   [] Abnormal -     HENT: [x] Normocephalic, atraumatic  [] Abnormal -   [x] Mouth/Throat: Mucous membranes are moist    External Ears [x] Normal  [] Abnormal -    Neck: [x] No visualized mass [] Abnormal -     Pulmonary/Chest: [x] Respiratory effort normal   [x] No visualized signs of difficulty breathing or respiratory distress        [] Abnormal -        Neurological:        [x] No Facial Asymmetry (Cranial nerve 7 motor function) (limited exam due to video visit)          [x] No gaze palsy        [] Abnormal -          Skin:        [x] No significant exanthematous lesions or discoloration noted on facial skin         [] Abnormal -            Psychiatric:       [x] Normal Affect [] Abnormal -        [x] No Hallucinations    Other pertinent observable physical exam findings:-              Assessment & Plan:   Diagnoses and all orders for this visit:    1. Allergic reaction, subsequent encounter  -     REFERRAL TO ALLERGY  -     EPINEPHrine (EpiPen 2-Noé) 0.3 mg/0.3 mL injection; 0.3 mL by IntraMUSCular route once as needed for Anaphylaxis or Allergic Response for up to 1 dose. 2. Angioedema, subsequent encounter  -     REFERRAL TO ALLERGY  -     EPINEPHrine (EpiPen 2-Noé) 0.3 mg/0.3 mL injection; 0.3 mL by IntraMUSCular route once as needed for Anaphylaxis or Allergic Response for up to 1 dose. Plan: Patient had allergic reaction to either cashews or almond milk.   EpiPen ordered and allergist consulted. If she  has similar episode again with angioedema she is to use the EpiPen, take 40 mg of prednisone (already on 7.5 mg daily), and go to the ED. We discussed the expected course, resolution and complications of the diagnosis(es) in detail. Medication risks, benefits, costs, interactions, and alternatives were discussed as indicated. I advised her to contact the office if her condition worsens, changes or fails to improve as anticipated. She expressed understanding with the diagnosis(es) and plan. John Paul Churchill is a 39 y.o. female being evaluated by a video visit encounter for concerns as above. A caregiver was present when appropriate. Due to this being a TeleHealth encounter (During TFYPS-05 public health emergency), evaluation of the following organ systems was limited: Vitals/Constitutional/EENT/Resp/CV/GI//MS/Neuro/Skin/Heme-Lymph-Imm. Pursuant to the emergency declaration under the [facility], [facility] waiver authority and the [employer] Act, this Virtual  Visit was conducted, with patient's (and/or legal guardian's) consent, to reduce the patient's risk of exposure to COVID-19 and provide necessary medical care. Services were provided through a video synchronous discussion virtually to substitute for in-person clinic visit. Patient and provider were located at their individual homes.           9/24/2021  Yulisa Broussard MD

## 2021-10-04 DIAGNOSIS — F41.9 ANXIETY: ICD-10-CM

## 2021-10-04 RX ORDER — CLONAZEPAM 0.5 MG/1
TABLET ORAL
Qty: 60 TABLET | Refills: 1 | Status: SHIPPED | OUTPATIENT
Start: 2021-10-04 | End: 2021-12-11

## 2021-10-05 ENCOUNTER — HOME CARE VISIT (OUTPATIENT)
Dept: HOME HEALTH SERVICES | Facility: HOME HEALTH | Age: 46
End: 2021-10-05
Payer: MEDICARE

## 2021-10-05 ENCOUNTER — HOME CARE VISIT (OUTPATIENT)
Dept: SCHEDULING | Facility: HOME HEALTH | Age: 46
End: 2021-10-05
Payer: MEDICARE

## 2021-10-05 PROCEDURE — G0156 HHCP-SVS OF AIDE,EA 15 MIN: HCPCS

## 2021-10-06 ENCOUNTER — HOME CARE VISIT (OUTPATIENT)
Dept: SCHEDULING | Facility: HOME HEALTH | Age: 46
End: 2021-10-06
Payer: MEDICARE

## 2021-10-06 VITALS — SYSTOLIC BLOOD PRESSURE: 108 MMHG | TEMPERATURE: 97.2 F | DIASTOLIC BLOOD PRESSURE: 64 MMHG | RESPIRATION RATE: 18 BRPM

## 2021-10-06 PROCEDURE — G0300 HHS/HOSPICE OF LPN EA 15 MIN: HCPCS

## 2021-10-06 PROCEDURE — A6250 SKIN SEAL PROTECT MOISTURIZR: HCPCS

## 2021-10-07 ENCOUNTER — HOME CARE VISIT (OUTPATIENT)
Dept: SCHEDULING | Facility: HOME HEALTH | Age: 46
End: 2021-10-07
Payer: MEDICARE

## 2021-10-07 PROCEDURE — G0151 HHCP-SERV OF PT,EA 15 MIN: HCPCS

## 2021-10-08 ENCOUNTER — HOME CARE VISIT (OUTPATIENT)
Dept: SCHEDULING | Facility: HOME HEALTH | Age: 46
End: 2021-10-08
Payer: MEDICARE

## 2021-10-08 VITALS
SYSTOLIC BLOOD PRESSURE: 125 MMHG | DIASTOLIC BLOOD PRESSURE: 82 MMHG | BODY MASS INDEX: 42.02 KG/M2 | TEMPERATURE: 97.6 F | OXYGEN SATURATION: 99 % | WEIGHT: 244.8 LBS | HEART RATE: 83 BPM

## 2021-10-08 VITALS
TEMPERATURE: 97.8 F | DIASTOLIC BLOOD PRESSURE: 80 MMHG | OXYGEN SATURATION: 98 % | SYSTOLIC BLOOD PRESSURE: 136 MMHG | HEART RATE: 73 BPM

## 2021-10-08 PROCEDURE — G0152 HHCP-SERV OF OT,EA 15 MIN: HCPCS

## 2021-10-08 PROCEDURE — G0300 HHS/HOSPICE OF LPN EA 15 MIN: HCPCS

## 2021-10-08 PROCEDURE — G0156 HHCP-SVS OF AIDE,EA 15 MIN: HCPCS

## 2021-10-09 VITALS
RESPIRATION RATE: 18 BRPM | DIASTOLIC BLOOD PRESSURE: 75 MMHG | SYSTOLIC BLOOD PRESSURE: 115 MMHG | TEMPERATURE: 97 F | HEART RATE: 92 BPM

## 2021-10-11 ENCOUNTER — HOME CARE VISIT (OUTPATIENT)
Dept: HOME HEALTH SERVICES | Facility: HOME HEALTH | Age: 46
End: 2021-10-11
Payer: MEDICARE

## 2021-10-11 ENCOUNTER — HOME CARE VISIT (OUTPATIENT)
Dept: SCHEDULING | Facility: HOME HEALTH | Age: 46
End: 2021-10-11
Payer: MEDICARE

## 2021-10-11 VITALS
OXYGEN SATURATION: 97 % | DIASTOLIC BLOOD PRESSURE: 64 MMHG | RESPIRATION RATE: 18 BRPM | SYSTOLIC BLOOD PRESSURE: 104 MMHG | HEART RATE: 87 BPM | TEMPERATURE: 97.8 F

## 2021-10-11 PROCEDURE — G0299 HHS/HOSPICE OF RN EA 15 MIN: HCPCS

## 2021-10-12 ENCOUNTER — HOME CARE VISIT (OUTPATIENT)
Dept: SCHEDULING | Facility: HOME HEALTH | Age: 46
End: 2021-10-12
Payer: MEDICARE

## 2021-10-12 VITALS
OXYGEN SATURATION: 97 % | SYSTOLIC BLOOD PRESSURE: 118 MMHG | RESPIRATION RATE: 18 BRPM | HEART RATE: 88 BPM | DIASTOLIC BLOOD PRESSURE: 76 MMHG | TEMPERATURE: 97.1 F

## 2021-10-12 LAB
ALBUMIN SERPL-MCNC: 2.6 G/DL (ref 3.8–4.8)
ALBUMIN/GLOB SERPL: 1 {RATIO} (ref 1.2–2.2)
ALP SERPL-CCNC: 127 IU/L (ref 44–121)
ALT SERPL-CCNC: 9 IU/L (ref 0–32)
AST SERPL-CCNC: 11 IU/L (ref 0–40)
BASOPHILS # BLD AUTO: 0 X10E3/UL (ref 0–0.2)
BASOPHILS NFR BLD AUTO: 0 %
BILIRUB SERPL-MCNC: 0.7 MG/DL (ref 0–1.2)
BUN SERPL-MCNC: 12 MG/DL (ref 6–24)
BUN/CREAT SERPL: 19 (ref 9–23)
CALCIUM SERPL-MCNC: 8.4 MG/DL (ref 8.7–10.2)
CHLORIDE SERPL-SCNC: 102 MMOL/L (ref 96–106)
CO2 SERPL-SCNC: 27 MMOL/L (ref 20–29)
CREAT SERPL-MCNC: 0.62 MG/DL (ref 0.57–1)
EOSINOPHIL # BLD AUTO: 0.1 X10E3/UL (ref 0–0.4)
EOSINOPHIL NFR BLD AUTO: 1 %
ERYTHROCYTE [DISTWIDTH] IN BLOOD BY AUTOMATED COUNT: 13.6 % (ref 11.7–15.4)
GLOBULIN SER CALC-MCNC: 2.6 G/DL (ref 1.5–4.5)
GLUCOSE SERPL-MCNC: 134 MG/DL (ref 65–99)
HCT VFR BLD AUTO: 36.7 % (ref 34–46.6)
HGB BLD-MCNC: 12.3 G/DL (ref 11.1–15.9)
IMM GRANULOCYTES # BLD AUTO: 0 X10E3/UL (ref 0–0.1)
IMM GRANULOCYTES NFR BLD AUTO: 0 %
LYMPHOCYTES # BLD AUTO: 3 X10E3/UL (ref 0.7–3.1)
LYMPHOCYTES NFR BLD AUTO: 32 %
MAGNESIUM SERPL-MCNC: 1.6 MG/DL (ref 1.6–2.3)
MCH RBC QN AUTO: 32.5 PG (ref 26.6–33)
MCHC RBC AUTO-ENTMCNC: 33.5 G/DL (ref 31.5–35.7)
MCV RBC AUTO: 97 FL (ref 79–97)
MONOCYTES # BLD AUTO: 0.7 X10E3/UL (ref 0.1–0.9)
MONOCYTES NFR BLD AUTO: 8 %
NEUTROPHILS # BLD AUTO: 5.4 X10E3/UL (ref 1.4–7)
NEUTROPHILS NFR BLD AUTO: 59 %
PHOSPHATE SERPL-MCNC: 3 MG/DL (ref 3–4.3)
PLATELET # BLD AUTO: 258 X10E3/UL (ref 150–450)
POTASSIUM SERPL-SCNC: 3.9 MMOL/L (ref 3.5–5.2)
PREALB SERPL-MCNC: 29 MG/DL (ref 12–34)
PROT SERPL-MCNC: 5.2 G/DL (ref 6–8.5)
RBC # BLD AUTO: 3.78 X10E6/UL (ref 3.77–5.28)
SODIUM SERPL-SCNC: 141 MMOL/L (ref 134–144)
TRIGL SERPL-MCNC: 103 MG/DL (ref 0–149)
WBC # BLD AUTO: 9.3 X10E3/UL (ref 3.4–10.8)

## 2021-10-12 PROCEDURE — G0300 HHS/HOSPICE OF LPN EA 15 MIN: HCPCS

## 2021-10-13 ENCOUNTER — HOME CARE VISIT (OUTPATIENT)
Dept: SCHEDULING | Facility: HOME HEALTH | Age: 46
End: 2021-10-13
Payer: MEDICARE

## 2021-10-13 PROCEDURE — G0157 HHC PT ASSISTANT EA 15: HCPCS

## 2021-10-13 PROCEDURE — 400014 HH F/U

## 2021-10-13 PROCEDURE — G0156 HHCP-SVS OF AIDE,EA 15 MIN: HCPCS

## 2021-10-14 ENCOUNTER — HOME CARE VISIT (OUTPATIENT)
Dept: SCHEDULING | Facility: HOME HEALTH | Age: 46
End: 2021-10-14
Payer: MEDICARE

## 2021-10-14 ENCOUNTER — HOME CARE VISIT (OUTPATIENT)
Dept: HOME HEALTH SERVICES | Facility: HOME HEALTH | Age: 46
End: 2021-10-14
Payer: MEDICARE

## 2021-10-14 VITALS
SYSTOLIC BLOOD PRESSURE: 118 MMHG | RESPIRATION RATE: 17 BRPM | DIASTOLIC BLOOD PRESSURE: 76 MMHG | HEART RATE: 76 BPM | TEMPERATURE: 97 F | OXYGEN SATURATION: 96 %

## 2021-10-14 VITALS
OXYGEN SATURATION: 99 % | HEART RATE: 78 BPM | DIASTOLIC BLOOD PRESSURE: 67 MMHG | SYSTOLIC BLOOD PRESSURE: 100 MMHG | TEMPERATURE: 97.7 F

## 2021-10-14 VITALS
OXYGEN SATURATION: 99 % | SYSTOLIC BLOOD PRESSURE: 101 MMHG | HEART RATE: 70 BPM | DIASTOLIC BLOOD PRESSURE: 71 MMHG | TEMPERATURE: 97.4 F

## 2021-10-14 PROCEDURE — G0300 HHS/HOSPICE OF LPN EA 15 MIN: HCPCS

## 2021-10-14 PROCEDURE — G0152 HHCP-SERV OF OT,EA 15 MIN: HCPCS

## 2021-10-15 ENCOUNTER — HOME CARE VISIT (OUTPATIENT)
Dept: SCHEDULING | Facility: HOME HEALTH | Age: 46
End: 2021-10-15
Payer: MEDICARE

## 2021-10-15 ENCOUNTER — HOME CARE VISIT (OUTPATIENT)
Dept: HOME HEALTH SERVICES | Facility: HOME HEALTH | Age: 46
End: 2021-10-15
Payer: MEDICARE

## 2021-10-15 PROCEDURE — G0157 HHC PT ASSISTANT EA 15: HCPCS

## 2021-10-16 ENCOUNTER — HOME CARE VISIT (OUTPATIENT)
Dept: HOME HEALTH SERVICES | Facility: HOME HEALTH | Age: 46
End: 2021-10-16
Payer: MEDICARE

## 2021-10-16 NOTE — PROGRESS NOTES
Progress Note    Patient: Faye Calvillo MRN: [id number]  SSN: [id number]    YOB: 1975  Age: 38 y.o. Sex: female      Admit Date: 3/7/2018    19 Days Post-Op    Procedure:  Procedure(s):  EXPLORATORY LAPAROTOMY, LYSIS OF ADHESION X 5HOURS, SMALL BOWEL FISTULA TAKE DOWN X 2 AND WOUND VAC PLACEMENT    Subjective:     No acute surgical issues. Pt reported some LE edema but BP a bit low for diuresis. Hemoglobin trending down a bit but no evidence of active bleeding. More fistula output from midline wound. Ostomy is minimally productive.       Objective:     Visit Vitals    BP (!) 79/51 (BP 1 Location: Left arm, BP Patient Position: At rest)    Pulse (!) 108    Temp 98.2 °F (36.8 °C)    Resp 19    Ht 5' 4\" (1.626 m)    Wt 345 lb 14.4 oz (156.9 kg)    SpO2 100%    BMI 59.37 kg/m2       Temp (24hrs), Av.7 °F (37.1 °C), Min:97.9 °F (36.6 °C), Max:99.5 °F (37.5 °C)        Physical Exam:    Gen:  NAD  Pulm:  Unlabored  Abd:  S/ND/appropriate TTP  Midline wound with bilious output  Ostomy:  Pink, patent and productive     Recent Results (from the past 24 hour(s))   GLUCOSE, POC    Collection Time: 18 11:51 AM   Result Value Ref Range    Glucose (POC) 164 (H) 65 - 100 mg/dL    Performed by Nevin Reeder    GLUCOSE, POC    Collection Time: 18  5:19 PM   Result Value Ref Range    Glucose (POC) 150 (H) 65 - 100 mg/dL    Performed by Jarret Perez  PCT    GLUCOSE, POC    Collection Time: 18 11:10 PM   Result Value Ref Range    Glucose (POC) 155 (H) 65 - 100 mg/dL    Performed by Ericka Rivas    CBC W/O DIFF    Collection Time: 18  4:06 AM   Result Value Ref Range    WBC 9.5 3.6 - 11.0 K/uL    RBC 2.38 (L) 3.80 - 5.20 M/uL    HGB 7.1 (L) 11.5 - 16.0 g/dL    HCT 22.6 (L) 35.0 - 47.0 %    MCV 95.0 80.0 - 99.0 FL    MCH 29.8 26.0 - 34.0 PG    MCHC 31.4 30.0 - 36.5 g/dL    RDW 17.6 (H) 11.5 - 14.5 %    PLATELET 472 103 - 066 K/uL    MPV 10.3 8.9 - 12.9 FL    NRBC 0.6 (H) 0 PER 100 WBC    ABSOLUTE NRBC 0.06 (H) 0.00 - 2.11 K/uL   METABOLIC PANEL, BASIC    Collection Time: 03/26/18  4:06 AM   Result Value Ref Range    Sodium 139 136 - 145 mmol/L    Potassium 3.8 3.5 - 5.1 mmol/L    Chloride 105 97 - 108 mmol/L    CO2 27 21 - 32 mmol/L    Anion gap 7 5 - 15 mmol/L    Glucose 126 (H) 65 - 100 mg/dL    BUN 14 6 - 20 MG/DL    Creatinine 0.55 0.55 - 1.02 MG/DL    BUN/Creatinine ratio 25 (H) 12 - 20      GFR est AA >60 >60 ml/min/1.73m2    GFR est non-AA >60 >60 ml/min/1.73m2    Calcium 8.2 (L) 8.5 - 10.1 MG/DL   MAGNESIUM    Collection Time: 03/26/18  4:06 AM   Result Value Ref Range    Magnesium 1.9 1.6 - 2.4 mg/dL   GLUCOSE, POC    Collection Time: 03/26/18  5:52 AM   Result Value Ref Range    Glucose (POC) 148 (H) 65 - 100 mg/dL    Performed by Rupesh Daniel          Assessment:     Hospital Problems  Date Reviewed: 10/27/2017          Codes Class Noted POA    Small bowel fistula ICD-10-CM: K63.2  ICD-9-CM: 569.81  3/7/2018 Unknown              Plan/Recommendations/Medical Decision Making:     - Local wound care to midline abdomen  - Pain control  - NPO with sips  - Continue octreotide  - Labs in am  - Renew TPN    Signed By: Shila Kennedy MD     March 26, 2018 No

## 2021-10-18 ENCOUNTER — HOME CARE VISIT (OUTPATIENT)
Dept: SCHEDULING | Facility: HOME HEALTH | Age: 46
End: 2021-10-18
Payer: MEDICARE

## 2021-10-18 VITALS
RESPIRATION RATE: 18 BRPM | DIASTOLIC BLOOD PRESSURE: 78 MMHG | HEART RATE: 66 BPM | TEMPERATURE: 97.2 F | SYSTOLIC BLOOD PRESSURE: 106 MMHG | DIASTOLIC BLOOD PRESSURE: 71 MMHG | TEMPERATURE: 97.3 F | SYSTOLIC BLOOD PRESSURE: 104 MMHG | OXYGEN SATURATION: 97 % | OXYGEN SATURATION: 99 % | HEART RATE: 68 BPM

## 2021-10-18 PROCEDURE — G0156 HHCP-SVS OF AIDE,EA 15 MIN: HCPCS

## 2021-10-18 PROCEDURE — G0157 HHC PT ASSISTANT EA 15: HCPCS

## 2021-10-20 ENCOUNTER — HOME CARE VISIT (OUTPATIENT)
Dept: SCHEDULING | Facility: HOME HEALTH | Age: 46
End: 2021-10-20
Payer: MEDICARE

## 2021-10-20 VITALS
RESPIRATION RATE: 18 BRPM | HEART RATE: 78 BPM | SYSTOLIC BLOOD PRESSURE: 118 MMHG | OXYGEN SATURATION: 96 % | TEMPERATURE: 97.5 F | DIASTOLIC BLOOD PRESSURE: 66 MMHG

## 2021-10-20 VITALS
SYSTOLIC BLOOD PRESSURE: 107 MMHG | OXYGEN SATURATION: 98 % | DIASTOLIC BLOOD PRESSURE: 70 MMHG | TEMPERATURE: 97.2 F | HEART RATE: 62 BPM

## 2021-10-20 PROCEDURE — G0157 HHC PT ASSISTANT EA 15: HCPCS

## 2021-10-20 PROCEDURE — G0300 HHS/HOSPICE OF LPN EA 15 MIN: HCPCS

## 2021-10-22 ENCOUNTER — HOME CARE VISIT (OUTPATIENT)
Dept: SCHEDULING | Facility: HOME HEALTH | Age: 46
End: 2021-10-22
Payer: MEDICARE

## 2021-10-22 PROCEDURE — G0152 HHCP-SERV OF OT,EA 15 MIN: HCPCS

## 2021-10-22 PROCEDURE — G0300 HHS/HOSPICE OF LPN EA 15 MIN: HCPCS

## 2021-10-22 PROCEDURE — G0156 HHCP-SVS OF AIDE,EA 15 MIN: HCPCS

## 2021-10-23 VITALS
OXYGEN SATURATION: 98 % | DIASTOLIC BLOOD PRESSURE: 64 MMHG | TEMPERATURE: 96.8 F | SYSTOLIC BLOOD PRESSURE: 116 MMHG | HEART RATE: 77 BPM

## 2021-10-24 PROCEDURE — A4247 BETADINE/IODINE SWABS/WIPES: HCPCS

## 2021-10-24 PROCEDURE — A6250 SKIN SEAL PROTECT MOISTURIZR: HCPCS

## 2021-10-25 ENCOUNTER — HOME CARE VISIT (OUTPATIENT)
Dept: HOME HEALTH SERVICES | Facility: HOME HEALTH | Age: 46
End: 2021-10-25
Payer: MEDICARE

## 2021-10-25 VITALS
TEMPERATURE: 98.2 F | DIASTOLIC BLOOD PRESSURE: 76 MMHG | OXYGEN SATURATION: 98 % | HEART RATE: 86 BPM | SYSTOLIC BLOOD PRESSURE: 121 MMHG

## 2021-10-26 ENCOUNTER — HOME CARE VISIT (OUTPATIENT)
Dept: SCHEDULING | Facility: HOME HEALTH | Age: 46
End: 2021-10-26
Payer: MEDICARE

## 2021-10-26 ENCOUNTER — HOME CARE VISIT (OUTPATIENT)
Dept: HOME HEALTH SERVICES | Facility: HOME HEALTH | Age: 46
End: 2021-10-26
Payer: MEDICARE

## 2021-10-26 PROCEDURE — G0300 HHS/HOSPICE OF LPN EA 15 MIN: HCPCS

## 2021-10-26 PROCEDURE — G0156 HHCP-SVS OF AIDE,EA 15 MIN: HCPCS

## 2021-10-26 NOTE — PROGRESS NOTES
Reason for Visit:  Follow-up abdominal wound check    Brief History:  38 yo woman with hx Crohn's disease presented to clinic for wound check. Pt reported overall she is doing okay. Pt is a bit downcast from recent passing of her mother. She does have a friend who has been helping her with wound care. Tolerating diet and fistula output appears to be under control. No nausea or vomiting. Visit Vitals  /72 (BP 1 Location: Left lower arm, BP Patient Position: Sitting)   Pulse (!) 122   Temp 98 °F (36.7 °C) (Oral)   Resp 20   Ht 5' 4\" (1.626 m)   Wt 254 lb (115.2 kg)   SpO2 98%   BMI 43.60 kg/m²         Physical Exam:    Gen:  NAD  Pulm:  Unlabored  Abd:  S/ND/midline fistula with enteric output.   Minimal wound edge excoriation    AP:  38 yo woman presented for follow-up abdominal fistula    - EC fistula:  Pt is planning for getting fistula taken down at Health system   - Continue nutrition by TPN and oral intake  - Local wound care to midline abdominal wound with wound appliance  - Follow-up prn

## 2021-10-27 ENCOUNTER — HOME CARE VISIT (OUTPATIENT)
Dept: SCHEDULING | Facility: HOME HEALTH | Age: 46
End: 2021-10-27
Payer: MEDICARE

## 2021-10-27 VITALS
TEMPERATURE: 97.8 F | HEART RATE: 70 BPM | DIASTOLIC BLOOD PRESSURE: 66 MMHG | OXYGEN SATURATION: 96 % | SYSTOLIC BLOOD PRESSURE: 118 MMHG

## 2021-10-27 PROCEDURE — G0157 HHC PT ASSISTANT EA 15: HCPCS

## 2021-10-28 ENCOUNTER — HOME CARE VISIT (OUTPATIENT)
Dept: SCHEDULING | Facility: HOME HEALTH | Age: 46
End: 2021-10-28
Payer: MEDICARE

## 2021-10-28 ENCOUNTER — HOME CARE VISIT (OUTPATIENT)
Dept: HOME HEALTH SERVICES | Facility: HOME HEALTH | Age: 46
End: 2021-10-28
Payer: MEDICARE

## 2021-10-28 VITALS
HEART RATE: 61 BPM | SYSTOLIC BLOOD PRESSURE: 108 MMHG | TEMPERATURE: 97.3 F | DIASTOLIC BLOOD PRESSURE: 76 MMHG | RESPIRATION RATE: 18 BRPM | OXYGEN SATURATION: 98 %

## 2021-10-28 VITALS
OXYGEN SATURATION: 99 % | DIASTOLIC BLOOD PRESSURE: 73 MMHG | SYSTOLIC BLOOD PRESSURE: 110 MMHG | HEART RATE: 71 BPM | TEMPERATURE: 45.7 F

## 2021-10-28 PROCEDURE — G0300 HHS/HOSPICE OF LPN EA 15 MIN: HCPCS

## 2021-10-28 PROCEDURE — G0152 HHCP-SERV OF OT,EA 15 MIN: HCPCS

## 2021-10-28 PROCEDURE — G0156 HHCP-SVS OF AIDE,EA 15 MIN: HCPCS

## 2021-10-29 ENCOUNTER — HOME CARE VISIT (OUTPATIENT)
Dept: SCHEDULING | Facility: HOME HEALTH | Age: 46
End: 2021-10-29
Payer: MEDICARE

## 2021-10-29 VITALS
RESPIRATION RATE: 17 BRPM | SYSTOLIC BLOOD PRESSURE: 109 MMHG | DIASTOLIC BLOOD PRESSURE: 72 MMHG | TEMPERATURE: 97.6 F | HEART RATE: 65 BPM | OXYGEN SATURATION: 97 %

## 2021-10-29 PROCEDURE — G0157 HHC PT ASSISTANT EA 15: HCPCS

## 2021-11-01 ENCOUNTER — HOME CARE VISIT (OUTPATIENT)
Dept: SCHEDULING | Facility: HOME HEALTH | Age: 46
End: 2021-11-01
Payer: MEDICARE

## 2021-11-01 PROCEDURE — G0156 HHCP-SVS OF AIDE,EA 15 MIN: HCPCS

## 2021-11-02 ENCOUNTER — HOME CARE VISIT (OUTPATIENT)
Dept: SCHEDULING | Facility: HOME HEALTH | Age: 46
End: 2021-11-02
Payer: MEDICARE

## 2021-11-02 VITALS
DIASTOLIC BLOOD PRESSURE: 76 MMHG | SYSTOLIC BLOOD PRESSURE: 109 MMHG | RESPIRATION RATE: 17 BRPM | TEMPERATURE: 97.7 F | HEART RATE: 84 BPM | OXYGEN SATURATION: 96 %

## 2021-11-03 ENCOUNTER — HOME CARE VISIT (OUTPATIENT)
Dept: SCHEDULING | Facility: HOME HEALTH | Age: 46
End: 2021-11-03
Payer: MEDICARE

## 2021-11-03 PROCEDURE — G0300 HHS/HOSPICE OF LPN EA 15 MIN: HCPCS

## 2021-11-04 ENCOUNTER — HOME CARE VISIT (OUTPATIENT)
Dept: SCHEDULING | Facility: HOME HEALTH | Age: 46
End: 2021-11-04
Payer: MEDICARE

## 2021-11-04 VITALS
RESPIRATION RATE: 19 BRPM | HEART RATE: 87 BPM | OXYGEN SATURATION: 96 % | TEMPERATURE: 97.8 F | SYSTOLIC BLOOD PRESSURE: 114 MMHG | DIASTOLIC BLOOD PRESSURE: 66 MMHG

## 2021-11-04 LAB
ALBUMIN SERPL-MCNC: 3.2 G/DL (ref 3.8–4.8)
ALBUMIN/GLOB SERPL: 1.3 {RATIO} (ref 1.2–2.2)
ALP SERPL-CCNC: 146 IU/L (ref 44–121)
ALT SERPL-CCNC: 24 IU/L (ref 0–32)
AST SERPL-CCNC: 24 IU/L (ref 0–40)
BASOPHILS # BLD AUTO: 0 X10E3/UL (ref 0–0.2)
BASOPHILS NFR BLD AUTO: 0 %
BILIRUB SERPL-MCNC: 1.3 MG/DL (ref 0–1.2)
BUN SERPL-MCNC: 21 MG/DL (ref 6–24)
BUN/CREAT SERPL: 33 (ref 9–23)
CALCIUM SERPL-MCNC: 8.2 MG/DL (ref 8.7–10.2)
CHLORIDE SERPL-SCNC: 96 MMOL/L (ref 96–106)
CO2 SERPL-SCNC: 29 MMOL/L (ref 20–29)
CREAT SERPL-MCNC: 0.63 MG/DL (ref 0.57–1)
EOSINOPHIL # BLD AUTO: 0.1 X10E3/UL (ref 0–0.4)
EOSINOPHIL NFR BLD AUTO: 1 %
ERYTHROCYTE [DISTWIDTH] IN BLOOD BY AUTOMATED COUNT: 14.3 % (ref 11.7–15.4)
GLOBULIN SER CALC-MCNC: 2.4 G/DL (ref 1.5–4.5)
GLUCOSE SERPL-MCNC: 139 MG/DL (ref 65–99)
HCT VFR BLD AUTO: 41.6 % (ref 34–46.6)
HGB BLD-MCNC: 14 G/DL (ref 11.1–15.9)
LYMPHOCYTES # BLD AUTO: 3.2 X10E3/UL (ref 0.7–3.1)
LYMPHOCYTES NFR BLD AUTO: 21 %
MAGNESIUM SERPL-MCNC: 2.1 MG/DL (ref 1.6–2.3)
MCH RBC QN AUTO: 32.9 PG (ref 26.6–33)
MCHC RBC AUTO-ENTMCNC: 33.7 G/DL (ref 31.5–35.7)
MCV RBC AUTO: 98 FL (ref 79–97)
MONOCYTES # BLD AUTO: 1.8 X10E3/UL (ref 0.1–0.9)
MONOCYTES NFR BLD AUTO: 12 %
NEUTROPHILS # BLD AUTO: 10.1 X10E3/UL (ref 1.4–7)
NEUTROPHILS NFR BLD AUTO: 66 %
PHOSPHATE SERPL-MCNC: 2.7 MG/DL (ref 3–4.3)
PLATELET # BLD AUTO: 295 X10E3/UL (ref 150–450)
POTASSIUM SERPL-SCNC: 4.3 MMOL/L (ref 3.5–5.2)
PROT SERPL-MCNC: 5.6 G/DL (ref 6–8.5)
RBC # BLD AUTO: 4.25 X10E6/UL (ref 3.77–5.28)
SODIUM SERPL-SCNC: 135 MMOL/L (ref 134–144)
TRIGL SERPL-MCNC: 96 MG/DL (ref 0–149)
WBC # BLD AUTO: 15.3 X10E3/UL (ref 3.4–10.8)

## 2021-11-04 PROCEDURE — G0151 HHCP-SERV OF PT,EA 15 MIN: HCPCS

## 2021-11-05 ENCOUNTER — HOME CARE VISIT (OUTPATIENT)
Dept: SCHEDULING | Facility: HOME HEALTH | Age: 46
End: 2021-11-05
Payer: MEDICARE

## 2021-11-05 VITALS
HEART RATE: 81 BPM | DIASTOLIC BLOOD PRESSURE: 70 MMHG | TEMPERATURE: 97.6 F | OXYGEN SATURATION: 98 % | SYSTOLIC BLOOD PRESSURE: 112 MMHG

## 2021-11-05 PROCEDURE — G0156 HHCP-SVS OF AIDE,EA 15 MIN: HCPCS

## 2021-11-05 PROCEDURE — G0300 HHS/HOSPICE OF LPN EA 15 MIN: HCPCS

## 2021-11-05 PROCEDURE — G0152 HHCP-SERV OF OT,EA 15 MIN: HCPCS

## 2021-11-07 VITALS
DIASTOLIC BLOOD PRESSURE: 72 MMHG | OXYGEN SATURATION: 97 % | HEART RATE: 77 BPM | RESPIRATION RATE: 18 BRPM | TEMPERATURE: 97.2 F | SYSTOLIC BLOOD PRESSURE: 114 MMHG

## 2021-11-08 ENCOUNTER — HOME CARE VISIT (OUTPATIENT)
Dept: SCHEDULING | Facility: HOME HEALTH | Age: 46
End: 2021-11-08
Payer: MEDICARE

## 2021-11-08 ENCOUNTER — TELEPHONE (OUTPATIENT)
Dept: FAMILY MEDICINE CLINIC | Age: 46
End: 2021-11-08

## 2021-11-08 PROCEDURE — G0156 HHCP-SVS OF AIDE,EA 15 MIN: HCPCS

## 2021-11-08 NOTE — TELEPHONE ENCOUNTER
Patient stated that she is unable to get home healthcare until she gets an MRI of her back and she would like to know if she can have an order ASAP.

## 2021-11-09 ENCOUNTER — HOME CARE VISIT (OUTPATIENT)
Dept: SCHEDULING | Facility: HOME HEALTH | Age: 46
End: 2021-11-09
Payer: MEDICARE

## 2021-11-09 VITALS
TEMPERATURE: 97.4 F | OXYGEN SATURATION: 97 % | RESPIRATION RATE: 16 BRPM | DIASTOLIC BLOOD PRESSURE: 64 MMHG | SYSTOLIC BLOOD PRESSURE: 112 MMHG | HEART RATE: 75 BPM

## 2021-11-09 VITALS
HEART RATE: 66 BPM | RESPIRATION RATE: 22 BRPM | SYSTOLIC BLOOD PRESSURE: 118 MMHG | OXYGEN SATURATION: 97 % | DIASTOLIC BLOOD PRESSURE: 62 MMHG | TEMPERATURE: 97.8 F

## 2021-11-09 PROCEDURE — G0300 HHS/HOSPICE OF LPN EA 15 MIN: HCPCS

## 2021-11-10 ENCOUNTER — HOME CARE VISIT (OUTPATIENT)
Dept: HOME HEALTH SERVICES | Facility: HOME HEALTH | Age: 46
End: 2021-11-10
Payer: MEDICARE

## 2021-11-11 ENCOUNTER — HOME CARE VISIT (OUTPATIENT)
Dept: HOME HEALTH SERVICES | Facility: HOME HEALTH | Age: 46
End: 2021-11-11
Payer: MEDICARE

## 2021-11-11 ENCOUNTER — TELEPHONE (OUTPATIENT)
Dept: SURGERY | Age: 46
End: 2021-11-11

## 2021-11-11 ENCOUNTER — HOME CARE VISIT (OUTPATIENT)
Dept: SCHEDULING | Facility: HOME HEALTH | Age: 46
End: 2021-11-11
Payer: MEDICARE

## 2021-11-11 VITALS
DIASTOLIC BLOOD PRESSURE: 64 MMHG | HEART RATE: 72 BPM | OXYGEN SATURATION: 98 % | SYSTOLIC BLOOD PRESSURE: 118 MMHG | TEMPERATURE: 96.6 F | RESPIRATION RATE: 18 BRPM

## 2021-11-11 PROCEDURE — G0300 HHS/HOSPICE OF LPN EA 15 MIN: HCPCS

## 2021-11-11 NOTE — TELEPHONE ENCOUNTER
Returned call to pt. Verified pt name and date of birth. Pt indicated to writer that she has been trying to reorder her ostomy supply from the [facility] and was notified that she needs a new order from Dr. Xi Vegas. Per information in pt chart, Order for [facility] was signed 08/03/2021, for 12 months refill. Writer called to [facility] for clarification. Writer spoke to [facility] person Teagan. Teagan indicated to writer that the pt. Will receive her ordered supply on 11/18/2021. Teagan verified to writer that the pt. Has a year worth of refill from 08/2021 to 08/2022. Teagan also notified [facility] that due to the pt's insurance she will have to call [facility] every month for the supplies they cannot deliver automatically. Teagan gave writer the number of [phone] option [phone] for patient to call to get supplies. Writer returned call to pt. Writer explained to pt everything that Teagan had relayed, and provided pt with the [facility] reorder number. Pt stated understanding.

## 2021-11-15 ENCOUNTER — HOME CARE VISIT (OUTPATIENT)
Dept: HOME HEALTH SERVICES | Facility: HOME HEALTH | Age: 46
End: 2021-11-15
Payer: MEDICARE

## 2021-11-16 ENCOUNTER — HOME CARE VISIT (OUTPATIENT)
Dept: HOME HEALTH SERVICES | Facility: HOME HEALTH | Age: 46
End: 2021-11-16
Payer: MEDICARE

## 2021-11-16 ENCOUNTER — TELEPHONE (OUTPATIENT)
Dept: FAMILY MEDICINE CLINIC | Age: 46
End: 2021-11-16

## 2021-11-16 DIAGNOSIS — M54.50 CHRONIC LOW BACK PAIN, UNSPECIFIED BACK PAIN LATERALITY, UNSPECIFIED WHETHER SCIATICA PRESENT: Primary | ICD-10-CM

## 2021-11-16 DIAGNOSIS — T78.3XXD ANGIOEDEMA, SUBSEQUENT ENCOUNTER: ICD-10-CM

## 2021-11-16 DIAGNOSIS — G89.29 CHRONIC LOW BACK PAIN, UNSPECIFIED BACK PAIN LATERALITY, UNSPECIFIED WHETHER SCIATICA PRESENT: Primary | ICD-10-CM

## 2021-11-16 DIAGNOSIS — T78.40XD ALLERGIC REACTION, SUBSEQUENT ENCOUNTER: ICD-10-CM

## 2021-11-16 NOTE — TELEPHONE ENCOUNTER
Patient stated that PT will not come back until spinal cord injury is ruled out. She stated that [facility] did not give her an MRI, only blood work was done. That they suspect Peripheral Neuropathy and suggests an ECP test.    She says that Dr Elaina Fox cannot see her until January and that her symptoms are getting worse. She also stated that she needs a referral for an Allergist as well.

## 2021-11-17 ENCOUNTER — HOME CARE VISIT (OUTPATIENT)
Dept: SCHEDULING | Facility: HOME HEALTH | Age: 46
End: 2021-11-17
Payer: MEDICARE

## 2021-11-17 ENCOUNTER — HOME CARE VISIT (OUTPATIENT)
Dept: HOME HEALTH SERVICES | Facility: HOME HEALTH | Age: 46
End: 2021-11-17
Payer: MEDICARE

## 2021-11-17 VITALS
TEMPERATURE: 97.6 F | BODY MASS INDEX: 40.51 KG/M2 | SYSTOLIC BLOOD PRESSURE: 125 MMHG | HEART RATE: 91 BPM | OXYGEN SATURATION: 99 % | RESPIRATION RATE: 18 BRPM | WEIGHT: 236 LBS | DIASTOLIC BLOOD PRESSURE: 82 MMHG

## 2021-11-17 PROCEDURE — G0300 HHS/HOSPICE OF LPN EA 15 MIN: HCPCS

## 2021-11-17 PROCEDURE — 400014 HH F/U

## 2021-11-18 ENCOUNTER — HOME CARE VISIT (OUTPATIENT)
Dept: HOME HEALTH SERVICES | Facility: HOME HEALTH | Age: 46
End: 2021-11-18
Payer: MEDICARE

## 2021-11-18 PROCEDURE — A6250 SKIN SEAL PROTECT MOISTURIZR: HCPCS

## 2021-11-18 NOTE — TELEPHONE ENCOUNTER
Please call let her know that referral to allergist was actually placed during her appointment on 10/1. Went ahead and reordered the referral.  If she does not receive a call her in 3 business days she should call him yourself. His name is Dr. Noah Love.

## 2021-11-18 NOTE — TELEPHONE ENCOUNTER
Please let her know the MRI of her lumbar spine was ordered. Please find out why she wants an allergist consulted.

## 2021-11-18 NOTE — TELEPHONE ENCOUNTER
Pt advised regarding MRI. She stated that she wanted to see the allergist as a follow up from the last ED visit from the allergic reaction to find out what she is allergic to.

## 2021-11-19 ENCOUNTER — HOME CARE VISIT (OUTPATIENT)
Dept: SCHEDULING | Facility: HOME HEALTH | Age: 46
End: 2021-11-19
Payer: MEDICARE

## 2021-11-19 ENCOUNTER — TELEPHONE (OUTPATIENT)
Dept: FAMILY MEDICINE CLINIC | Age: 46
End: 2021-11-19

## 2021-11-19 PROCEDURE — G0300 HHS/HOSPICE OF LPN EA 15 MIN: HCPCS

## 2021-11-19 NOTE — TELEPHONE ENCOUNTER
Theodora with [facility] called in regards to getting the name of a neurologist for the pt , Her # is [phone].

## 2021-11-19 NOTE — TELEPHONE ENCOUNTER
Saw the neurologist at work for [facility] includes Dr. Armen Chen, and [facility]. Please call and let her know.

## 2021-11-21 VITALS
TEMPERATURE: 98 F | RESPIRATION RATE: 18 BRPM | SYSTOLIC BLOOD PRESSURE: 105 MMHG | DIASTOLIC BLOOD PRESSURE: 71 MMHG | HEART RATE: 93 BPM | OXYGEN SATURATION: 99 %

## 2021-11-22 ENCOUNTER — HOME CARE VISIT (OUTPATIENT)
Dept: HOME HEALTH SERVICES | Facility: HOME HEALTH | Age: 46
End: 2021-11-22
Payer: MEDICARE

## 2021-11-23 ENCOUNTER — HOME CARE VISIT (OUTPATIENT)
Dept: SCHEDULING | Facility: HOME HEALTH | Age: 46
End: 2021-11-23
Payer: MEDICARE

## 2021-11-23 PROCEDURE — G0300 HHS/HOSPICE OF LPN EA 15 MIN: HCPCS

## 2021-11-24 ENCOUNTER — HOME CARE VISIT (OUTPATIENT)
Dept: HOME HEALTH SERVICES | Facility: HOME HEALTH | Age: 46
End: 2021-11-24
Payer: MEDICARE

## 2021-11-25 ENCOUNTER — TELEPHONE (OUTPATIENT)
Dept: FAMILY MEDICINE CLINIC | Age: 46
End: 2021-11-25

## 2021-11-26 ENCOUNTER — HOME CARE VISIT (OUTPATIENT)
Dept: SCHEDULING | Facility: HOME HEALTH | Age: 46
End: 2021-11-26
Payer: MEDICARE

## 2021-11-26 PROCEDURE — G0300 HHS/HOSPICE OF LPN EA 15 MIN: HCPCS

## 2021-11-27 NOTE — PROGRESS NOTES
03/22/18 0348   Vitals   Temp 98.3 °F (36.8 °C)   Temp Source Oral   Pulse (Heart Rate) (!) 106   Heart Rate Source Monitor   Resp Rate 16   O2 Sat (%) 96 %   Level of Consciousness Alert   BP 93/44   MAP (Calculated) (!) 60   BP 1 Location Left arm   BP 1 Method Automatic   Cardiac Rhythm Sinus Tach   MEWS Score 3   Alarms Set and Audible Cardiac alarms   Box Number 433   Electrodes Replaced No     Patient is asymptomatic. Will continue to monitor. Baseline mews is 3. HAS CELL PHONE WITH HIM

## 2021-11-28 VITALS
DIASTOLIC BLOOD PRESSURE: 76 MMHG | HEART RATE: 89 BPM | SYSTOLIC BLOOD PRESSURE: 114 MMHG | TEMPERATURE: 98.4 F | RESPIRATION RATE: 18 BRPM | OXYGEN SATURATION: 99 %

## 2021-11-29 ENCOUNTER — HOME CARE VISIT (OUTPATIENT)
Dept: SCHEDULING | Facility: HOME HEALTH | Age: 46
End: 2021-11-29
Payer: MEDICARE

## 2021-11-29 ENCOUNTER — HOME CARE VISIT (OUTPATIENT)
Dept: HOME HEALTH SERVICES | Facility: HOME HEALTH | Age: 46
End: 2021-11-29
Payer: MEDICARE

## 2021-11-29 PROCEDURE — G0156 HHCP-SVS OF AIDE,EA 15 MIN: HCPCS

## 2021-12-01 ENCOUNTER — HOME CARE VISIT (OUTPATIENT)
Dept: SCHEDULING | Facility: HOME HEALTH | Age: 46
End: 2021-12-01
Payer: MEDICARE

## 2021-12-01 PROCEDURE — G0300 HHS/HOSPICE OF LPN EA 15 MIN: HCPCS

## 2021-12-02 ENCOUNTER — HOME CARE VISIT (OUTPATIENT)
Dept: HOME HEALTH SERVICES | Facility: HOME HEALTH | Age: 46
End: 2021-12-02
Payer: MEDICARE

## 2021-12-03 ENCOUNTER — HOME CARE VISIT (OUTPATIENT)
Dept: SCHEDULING | Facility: HOME HEALTH | Age: 46
End: 2021-12-03
Payer: MEDICARE

## 2021-12-03 ENCOUNTER — VIRTUAL VISIT (OUTPATIENT)
Dept: FAMILY MEDICINE CLINIC | Age: 46
End: 2021-12-03
Payer: MEDICARE

## 2021-12-03 DIAGNOSIS — M54.50 CHRONIC LOW BACK PAIN, UNSPECIFIED BACK PAIN LATERALITY, UNSPECIFIED WHETHER SCIATICA PRESENT: ICD-10-CM

## 2021-12-03 DIAGNOSIS — G89.29 CHRONIC LOW BACK PAIN, UNSPECIFIED BACK PAIN LATERALITY, UNSPECIFIED WHETHER SCIATICA PRESENT: ICD-10-CM

## 2021-12-03 DIAGNOSIS — G62.9 NEUROPATHY: Primary | ICD-10-CM

## 2021-12-03 PROCEDURE — 99213 OFFICE O/P EST LOW 20 MIN: CPT | Performed by: STUDENT IN AN ORGANIZED HEALTH CARE EDUCATION/TRAINING PROGRAM

## 2021-12-03 PROCEDURE — G0300 HHS/HOSPICE OF LPN EA 15 MIN: HCPCS

## 2021-12-03 PROCEDURE — G8417 CALC BMI ABV UP PARAM F/U: HCPCS | Performed by: STUDENT IN AN ORGANIZED HEALTH CARE EDUCATION/TRAINING PROGRAM

## 2021-12-03 PROCEDURE — A6250 SKIN SEAL PROTECT MOISTURIZR: HCPCS

## 2021-12-03 PROCEDURE — G8427 DOCREV CUR MEDS BY ELIG CLIN: HCPCS | Performed by: STUDENT IN AN ORGANIZED HEALTH CARE EDUCATION/TRAINING PROGRAM

## 2021-12-03 PROCEDURE — G8510 SCR DEP NEG, NO PLAN REQD: HCPCS | Performed by: STUDENT IN AN ORGANIZED HEALTH CARE EDUCATION/TRAINING PROGRAM

## 2021-12-03 RX ORDER — EPINEPHRINE 0.3 MG/.3ML
INJECTION SUBCUTANEOUS
COMMUNITY
Start: 2021-10-01

## 2021-12-03 NOTE — PROGRESS NOTES
Chief Complaint   Patient presents with    Follow-up     Discuss continuing [facility]. 1. Have you been to the ER, urgent care clinic since your last visit? Hospitalized since your last visit? Yes, [facility]     2. Have you seen or consulted any other health care providers outside of the [address] [place] [address] since your last visit? Include any pap smears or colon screening.  Yes Saw allergist Dr. Nae Friedman, allergy to [address]      3 most recent PHQ Screens 12/3/2021   Little interest or pleasure in doing things Not at all   Feeling down, depressed, irritable, or hopeless Several days   Total Score PHQ 2 1

## 2021-12-03 NOTE — PROGRESS NOTES
Consent: Subha Reese, who was seen by synchronous (real-time) audio-video technology, and/or her healthcare decision maker, is aware that this patient-initiated, Telehealth encounter on 12/3/2021 is a billable service, with coverage as determined by her insurance carrier. She is aware that she may receive a bill and has provided verbal consent to proceed: YES-Consent obtained within past 12 months        712  Subjective: Subha Reese is a 39 y.o. female who was seen for Follow-up (Discuss continuing [place].)      Patient is here due to her home PT currently being on hold. Home PT was placed on hold as she has developed neuropathy symptoms of her hands and feet including tingling and numbness, and they would like a spinal cord injury to be ruled out first.  Patient also has a history of low back pain, and lumbar spine MRI has been ordered. Patient denies any current neck pain, or decreased range of motion. Patient was on daily B12 in the past, but that was stopped and she currently takes OTC B12 once a month. Of note she does use TPN and eats food as well. Scheduled to have MRI done next week. She is also scheduled with 2 different neurologist as she wants to make sure she is able to get into see somebody soon. She is scheduled to see the neurologist 1 month. She was told she may need an EMG. Prior to Admission medications    Medication Sig Start Date End Date Taking? Authorizing Provider   EPINEPHrine (EPIPEN) 0.3 mg/0.3 mL injection inject 0.3 milliliters ( 0.3 milligrams ) intramuscularly ONCE if. ..  (REFER TO PRESCRIPTION NOTES). 10/1/21  Yes Provider, Historical   predniSONE (DELTASONE) 2.5 mg tablet Take 2.5 mg by mouth Every morning.  10/22/21  Yes Provider, Historical   clonazePAM (KlonoPIN) 0.5 mg tablet take 1 tablet by mouth twice a day if needed for anxiety maximum daily dose of 2 10/4/21  Yes Romana Cunningham MD   albuterol (PROVENTIL HFA, VENTOLIN HFA, PROAIR HFA) 90 mcg/actuation inhaler EVERY 4 HOURS AS NEEDED as needed for ALLERGIES   Yes Provider, Historical   potassium chloride SA (MICRO-K) 10 mEq capsule potassium chloride ER 10 mEq capsule,extended release   take 1 capsule by mouth every morning WHILE ON LASIX   Yes Provider, Historical   heparin, porcine, 100 unit/mL injection 300 Units by IntraVENous route daily. 6/11/21  Yes Beti Jacobs MD   morphine CR (MS CONTIN) 60 mg CR tablet Take 60 mg by mouth every eight (8) hours. 6/11/21  Yes Beti Jacobs MD   morphine CR (MS CONTIN) 30 mg CR tablet Take 30 mg by mouth every eight (8) hours as needed for Pain. max of 3 pills daiy  6/11/21  Yes Pam Garrido MD   nystatin (MYCOSTATIN) powder Apply to the affected areas (peristomal skin) 2 to 3 times daily  Patient taking differently: Apply 100,000 Units to affected area three (3) times daily. Apply . 5 -1 g to the affected areas (peristomal skin) 2 to 3 times daily  Indications: a skin infection due to the fungus Candida 2/4/21  Yes Beti Jacobs MD   Humira,, Pen 40 mg/0.4 mL injection pen 40 mg by SubCUTAneous route every seven (7) days. per subsequtaneous injection Once a week  10/20/20  Yes Provider, Historical   naloxone (Narcan) 4 mg/actuation nasal spray Narcan 4 mg/actuation nasal spray (spray 1 actuation via nasal for opiod overdose)  12/17/20  Yes Provider, Historical   metroNIDAZOLE (FlagyL) 500 mg tablet Take 500 mg by mouth three (3) times daily. 9/30/20  Yes Zoie Gracia MD   predniSONE (DELTASONE) 5 mg tablet Take 5 mg by mouth nightly. Yes Provider, Historical   cholecalciferol (VITAMIN D3) (1000 Units /25 mcg) tablet Take 5,000 Units by mouth daily. Yes Provider, Historical   furosemide (LASIX) 20 mg tablet Take 20 mg by mouth daily.  TAKE AS NEED FOR FLUID RETENTION OF 3 POUNDS OR MORE UNTIL RETURN TO BASELINE WEIGHT   Yes Provider, Historical   acetaminophen-caffeine 500-65 mg (EXCEDRINE TENSION HEADACHE) 500-65 mg tab Take 1 Tab by mouth every eight (8) hours as needed for Headache. Yes Provider, Historical   azaTHIOprine (IMURAN) 50 mg tablet Take 150 mg by mouth daily. Yes Provider, Historical   mv-min-C-glutamin-lysine-hb124 (IMMUNE SUPPORT) 250-12.5 mg chew Take 1 Tab by mouth daily. Yes Provider, Historical   aspirin (ASPIRIN) 325 mg tablet Take 325 mg by mouth daily. 8/15/19  Yes Gilbert Dominguez MD   TPN ADULT - CENTRAL 2,000 mL by IntraVENous route (central line) two (2) times a week. over 12 hours. Taper infusion rate up 1 hr/down1 hr   Yes Mario Urban MD   omeprazole (PRILOSEC) 20 mg capsule Take 20 mg by mouth daily. 8/13/18  Yes Gilbert Dominguez MD   sodium chloride (NORMAL SALINE FLUSH) 10-20 mL by IntraVENous Push route daily. before and after TPN and/or labs   Yes Provider, Historical   dronabinol (MARINOL) 5 mg capsule Take 1 Cap by mouth two (2) times daily as needed (appetite). Max Daily Amount: 10 mg. Patient taking differently: Take 1 Cap by mouth every six (6) hours as needed for Nausea. 6/12/18  Yes Gilbert Dominguez MD   ferrous sulfate 325 mg (65 mg iron) tablet Take 1 Tab by mouth Daily (before breakfast). Patient taking differently: Take 1 Tablet by mouth every other day. 6/12/18  Yes Gilbert Dominguez MD   promethazine (PHENERGAN) 25 mg tablet Take 25 mg by mouth every six (6) hours as needed for Nausea. Yes Provider, Historical   VITAMIN B-12 5,000 mcg subl Take 5,000 mcg by mouth every month. 10/30/17  Yes Provider, Historical   aspirin-acetaminophen-caffeine (EXCEDRIN ES) 250-250-65 mg per tablet 1 Tablet. 12/3/21  Provider, Historical   escitalopram oxalate (LEXAPRO) 10 mg tablet Take 5 mg by mouth daily. Provider, Historical   escitalopram oxalate (LEXAPRO) 10 mg tablet Take 10 mg by mouth daily.  9/15/21 12/3/21  Juan Pastrana MD     Allergies   Allergen Reactions    Sulfa (Sulfonamide Antibiotics) Anaphylaxis    Demerol [Meperidine] Rash    Soma [Carisoprodol] Rash and Nausea Only    Toradol [Ketorolac Tromethamine] Nausea and Vomiting     Patient Active Problem List    Diagnosis    Secondary diabetes ([facility]     Due to blocked pancreatic duct. BS now well controlled. Resolved?  Pure hypercholesterolemia    Right renal mass     Follows with urology oncology      Intestinal infection     On chronic Cipro and Flagyl for this.  Vitamin D deficiency    Cyst of left kidney     S/P ablation      Chronic anemia    Small bowel fistula     Follow-up with GI, and surgery      Morbid obesity ([address]    Wound, open, anterior abdominal wall    Enterocutaneous fistula     Follow-up with GI, and surgery      Abdominal pain     Follows with Pain doctor      Perforated bowel ([address]    Crohn's disease ([facility]     Follows with GI      Incarcerated ventral hernia     History of           Review of Systems   All other systems reviewed and are negative. Objective:   Vital Signs: (As obtained by patient/caregiver at home)  There were no vitals taken for this visit.      [INSTRUCTIONS:  \"[x]\" Indicates a positive item  \"[]\" Indicates a negative item  -- DELETE ALL ITEMS NOT EXAMINED]    Constitutional: [x] Appears well-developed and well-nourished [x] No apparent distress      [] Abnormal -     Mental status: [x] Alert and awake  [x] Oriented to person/place/time [x] Able to follow commands    [] Abnormal -     Eyes:   EOM    [x]  Normal    [] Abnormal -   Sclera  [x]  Normal    [] Abnormal -          Discharge [x]  None visible   [] Abnormal -     HENT: [x] Normocephalic, atraumatic  [] Abnormal -   [x] Mouth/Throat: Mucous membranes are moist    External Ears [x] Normal  [] Abnormal -    Neck: [x] No visualized mass [] Abnormal -     Pulmonary/Chest: [x] Respiratory effort normal   [x] No visualized signs of difficulty breathing or respiratory distress        [] Abnormal -        Neurological:        [x] No Facial Asymmetry (Cranial nerve 7 motor function) (limited exam due to video visit)          [x] No gaze palsy [] Abnormal -          Skin:        [x] No significant exanthematous lesions or discoloration noted on facial skin         [] Abnormal -            Psychiatric:       [x] Normal Affect [] Abnormal -        [x] No Hallucinations    Other pertinent observable physical exam findings:-              Assessment & Plan:   Diagnoses and all orders for this visit:    1. Neuropathy  -     VITAMIN B12  -     TSH 3RD GENERATION  -     FOLATE  -     HEMOGLOBIN A1C WITH EAG  -     VITAMIN D, 25 HYDROXY    2. Chronic low back pain, unspecified back pain laterality, unspecified whether sciatica present      Plan:  Due to new onset neuropathy in her hands and feet Home physical therapy is currently being withheld. They will need a central spinal call if they be ruled out first.  Scheduled to have low back MRI done soon. Will have the home health care at B12, TSH, A1c to her labs. Scheduled to see neurology soon. We will have her see the neurologist t before okaying PT as she will likely get an EMG done. We discussed the expected course, resolution and complications of the diagnosis(es) in detail. Medication risks, benefits, costs, interactions, and alternatives were discussed as indicated. I advised her to contact the office if her condition worsens, changes or fails to improve as anticipated. She expressed understanding with the diagnosis(es) and plan. Brianna Devries is a 39 y.o. female being evaluated by a video visit encounter for concerns as above. A caregiver was present when appropriate. Due to this being a TeleHealth encounter (During [facility]- public health emergency), evaluation of the following organ systems was limited: Vitals/Constitutional/EENT/Resp/CV/GI//MS/Neuro/Skin/Heme-Lymph-Imm.   Pursuant to the emergency declaration under the [facility], [address] [place] [address] authority and the [employer] General Act, this Virtual  Visit was conducted, with patient's (and/or legal guardian's) consent, to reduce the patient's risk of exposure to COVID-19 and provide necessary medical care. Services were provided through a video synchronous discussion virtually to substitute for in-person clinic visit. Patient and provider were located at their individual homes.         Janusz Hart MD

## 2021-12-05 VITALS
RESPIRATION RATE: 18 BRPM | TEMPERATURE: 97.7 F | TEMPERATURE: 98.3 F | OXYGEN SATURATION: 99 % | DIASTOLIC BLOOD PRESSURE: 70 MMHG | HEART RATE: 86 BPM | RESPIRATION RATE: 18 BRPM | SYSTOLIC BLOOD PRESSURE: 132 MMHG | OXYGEN SATURATION: 99 % | SYSTOLIC BLOOD PRESSURE: 103 MMHG | DIASTOLIC BLOOD PRESSURE: 88 MMHG | OXYGEN SATURATION: 99 % | HEART RATE: 77 BPM | TEMPERATURE: 98 F | HEART RATE: 76 BPM | DIASTOLIC BLOOD PRESSURE: 76 MMHG | SYSTOLIC BLOOD PRESSURE: 108 MMHG | RESPIRATION RATE: 18 BRPM

## 2021-12-07 ENCOUNTER — HOME CARE VISIT (OUTPATIENT)
Dept: SCHEDULING | Facility: HOME HEALTH | Age: 46
End: 2021-12-07
Payer: MEDICARE

## 2021-12-07 PROCEDURE — G0156 HHCP-SVS OF AIDE,EA 15 MIN: HCPCS

## 2021-12-07 PROCEDURE — G0300 HHS/HOSPICE OF LPN EA 15 MIN: HCPCS

## 2021-12-07 NOTE — CASE COMMUNICATION
Dr. Sascha Olguin,    Ms. Jamil Verma continues to have progressive weakness and loss of motor function in b/l UEs and LEs. She has not been evaluated by a doctor or had any diagnostic imaging to r/o spinal cord involvement. At this time PT is discontinued pending evaluation by MD and release to safely participate in tx.     Thank you,  Gregg Davis

## 2021-12-08 LAB
ALBUMIN SERPL-MCNC: 3 G/DL (ref 3.8–4.8)
ALBUMIN/GLOB SERPL: 1.2 {RATIO} (ref 1.2–2.2)
ALP SERPL-CCNC: 128 IU/L (ref 44–121)
ALT SERPL-CCNC: 8 IU/L (ref 0–32)
AST SERPL-CCNC: 11 IU/L (ref 0–40)
BASOPHILS # BLD AUTO: 0 X10E3/UL (ref 0–0.2)
BASOPHILS NFR BLD AUTO: 0 %
BILIRUB SERPL-MCNC: 0.5 MG/DL (ref 0–1.2)
BUN SERPL-MCNC: 9 MG/DL (ref 6–24)
BUN/CREAT SERPL: 16 (ref 9–23)
CALCIUM SERPL-MCNC: 8.5 MG/DL (ref 8.7–10.2)
CHLORIDE SERPL-SCNC: 103 MMOL/L (ref 96–106)
CO2 SERPL-SCNC: 27 MMOL/L (ref 20–29)
CREAT SERPL-MCNC: 0.56 MG/DL (ref 0.57–1)
EOSINOPHIL # BLD AUTO: 0.2 X10E3/UL (ref 0–0.4)
EOSINOPHIL NFR BLD AUTO: 2 %
ERYTHROCYTE [DISTWIDTH] IN BLOOD BY AUTOMATED COUNT: 12.8 % (ref 11.7–15.4)
GLOBULIN SER CALC-MCNC: 2.6 G/DL (ref 1.5–4.5)
GLUCOSE SERPL-MCNC: 76 MG/DL (ref 65–99)
HCT VFR BLD AUTO: 38.5 % (ref 34–46.6)
HGB BLD-MCNC: 12.7 G/DL (ref 11.1–15.9)
IMM GRANULOCYTES # BLD AUTO: 0.1 X10E3/UL (ref 0–0.1)
IMM GRANULOCYTES NFR BLD AUTO: 1 %
LYMPHOCYTES # BLD AUTO: 3 X10E3/UL (ref 0.7–3.1)
LYMPHOCYTES NFR BLD AUTO: 32 %
MAGNESIUM SERPL-MCNC: 1.6 MG/DL (ref 1.6–2.3)
MCH RBC QN AUTO: 32.8 PG (ref 26.6–33)
MCHC RBC AUTO-ENTMCNC: 33 G/DL (ref 31.5–35.7)
MCV RBC AUTO: 100 FL (ref 79–97)
MONOCYTES # BLD AUTO: 0.8 X10E3/UL (ref 0.1–0.9)
MONOCYTES NFR BLD AUTO: 8 %
NEUTROPHILS # BLD AUTO: 5.4 X10E3/UL (ref 1.4–7)
NEUTROPHILS NFR BLD AUTO: 57 %
PHOSPHATE SERPL-MCNC: 3 MG/DL (ref 3–4.3)
PLATELET # BLD AUTO: 326 X10E3/UL (ref 150–450)
POTASSIUM SERPL-SCNC: 4 MMOL/L (ref 3.5–5.2)
PREALB SERPL-MCNC: 31 MG/DL (ref 12–34)
PROT SERPL-MCNC: 5.6 G/DL (ref 6–8.5)
RBC # BLD AUTO: 3.87 X10E6/UL (ref 3.77–5.28)
SODIUM SERPL-SCNC: 142 MMOL/L (ref 134–144)
TRIGL SERPL-MCNC: 90 MG/DL (ref 0–149)
WBC # BLD AUTO: 9.4 X10E3/UL (ref 3.4–10.8)

## 2021-12-09 ENCOUNTER — HOSPITAL ENCOUNTER (OUTPATIENT)
Dept: MRI IMAGING | Age: 46
Discharge: HOME | End: 2021-12-09
Payer: MEDICARE

## 2021-12-09 DIAGNOSIS — G89.29 CHRONIC LOW BACK PAIN, UNSPECIFIED BACK PAIN LATERALITY, UNSPECIFIED WHETHER SCIATICA PRESENT: ICD-10-CM

## 2021-12-09 DIAGNOSIS — M54.50 CHRONIC LOW BACK PAIN, UNSPECIFIED BACK PAIN LATERALITY, UNSPECIFIED WHETHER SCIATICA PRESENT: ICD-10-CM

## 2021-12-09 PROCEDURE — 72148 MRI LUMBAR SPINE W/O DYE: CPT

## 2021-12-10 ENCOUNTER — HOME CARE VISIT (OUTPATIENT)
Dept: HOME HEALTH SERVICES | Facility: HOME HEALTH | Age: 46
End: 2021-12-10
Payer: MEDICARE

## 2021-12-10 ENCOUNTER — HOME CARE VISIT (OUTPATIENT)
Dept: SCHEDULING | Facility: HOME HEALTH | Age: 46
End: 2021-12-10
Payer: MEDICARE

## 2021-12-10 VITALS
HEART RATE: 77 BPM | RESPIRATION RATE: 18 BRPM | TEMPERATURE: 97.2 F | DIASTOLIC BLOOD PRESSURE: 76 MMHG | SYSTOLIC BLOOD PRESSURE: 124 MMHG | OXYGEN SATURATION: 99 %

## 2021-12-10 PROCEDURE — G0299 HHS/HOSPICE OF RN EA 15 MIN: HCPCS

## 2021-12-12 VITALS
SYSTOLIC BLOOD PRESSURE: 115 MMHG | TEMPERATURE: 97.9 F | RESPIRATION RATE: 18 BRPM | OXYGEN SATURATION: 99 % | HEART RATE: 79 BPM | DIASTOLIC BLOOD PRESSURE: 74 MMHG

## 2021-12-15 ENCOUNTER — HOME CARE VISIT (OUTPATIENT)
Dept: SCHEDULING | Facility: HOME HEALTH | Age: 46
End: 2021-12-15
Payer: MEDICARE

## 2021-12-15 PROCEDURE — 400014 HH F/U

## 2021-12-15 PROCEDURE — G0300 HHS/HOSPICE OF LPN EA 15 MIN: HCPCS

## 2021-12-16 LAB
25(OH)D3+25(OH)D2 SERPL-MCNC: 51 NG/ML (ref 30–100)
EST. AVERAGE GLUCOSE BLD GHB EST-MCNC: 126 MG/DL
FOLATE SERPL-MCNC: >20 NG/ML
HBA1C MFR BLD: 6 % (ref 4.8–5.6)
TSH SERPL DL<=0.005 MIU/L-ACNC: 1.63 UIU/ML (ref 0.45–4.5)
VIT B12 SERPL-MCNC: 1365 PG/ML (ref 232–1245)

## 2021-12-17 ENCOUNTER — HOME CARE VISIT (OUTPATIENT)
Dept: SCHEDULING | Facility: HOME HEALTH | Age: 46
End: 2021-12-17
Payer: MEDICARE

## 2021-12-17 PROCEDURE — G0300 HHS/HOSPICE OF LPN EA 15 MIN: HCPCS

## 2021-12-18 VITALS
DIASTOLIC BLOOD PRESSURE: 70 MMHG | OXYGEN SATURATION: 99 % | TEMPERATURE: 97 F | HEART RATE: 71 BPM | DIASTOLIC BLOOD PRESSURE: 74 MMHG | HEART RATE: 78 BPM | SYSTOLIC BLOOD PRESSURE: 112 MMHG | SYSTOLIC BLOOD PRESSURE: 126 MMHG | WEIGHT: 240.96 LBS | RESPIRATION RATE: 20 BRPM | RESPIRATION RATE: 18 BRPM | BODY MASS INDEX: 41.36 KG/M2 | TEMPERATURE: 98.4 F | OXYGEN SATURATION: 99 %

## 2021-12-18 PROBLEM — R73.03 PREDIABETES: Status: ACTIVE | Noted: 2021-12-18

## 2021-12-18 PROCEDURE — A6250 SKIN SEAL PROTECT MOISTURIZR: HCPCS

## 2021-12-18 NOTE — PROGRESS NOTES
Result note sent via [employer]: Your vitamin B12 is actually just borderline elevated. There is no clinical   significance to this. Your A1c is 6.0 which is in the prediabetic range. Suggest decreasing carbs and sugars in your diet. May need to adjust TPN. Rest your labs are normal include normal liver, kidneys, electrolytes, vitamin D. No anemia.

## 2021-12-21 ENCOUNTER — HOME CARE VISIT (OUTPATIENT)
Dept: SCHEDULING | Facility: HOME HEALTH | Age: 46
End: 2021-12-21
Payer: MEDICARE

## 2021-12-21 ENCOUNTER — TELEPHONE (OUTPATIENT)
Dept: FAMILY MEDICINE CLINIC | Age: 46
End: 2021-12-21

## 2021-12-21 VITALS
TEMPERATURE: 98.7 F | RESPIRATION RATE: 20 BRPM | OXYGEN SATURATION: 99 % | DIASTOLIC BLOOD PRESSURE: 77 MMHG | HEART RATE: 92 BPM | SYSTOLIC BLOOD PRESSURE: 108 MMHG

## 2021-12-21 PROCEDURE — G0300 HHS/HOSPICE OF LPN EA 15 MIN: HCPCS

## 2021-12-23 ENCOUNTER — HOME CARE VISIT (OUTPATIENT)
Dept: SCHEDULING | Facility: HOME HEALTH | Age: 46
End: 2021-12-23
Payer: MEDICARE

## 2021-12-23 DIAGNOSIS — R29.890 HEIGHT LOSS: ICD-10-CM

## 2021-12-23 DIAGNOSIS — M47.816 ARTHRITIS, LUMBAR SPINE: Primary | ICD-10-CM

## 2021-12-23 PROCEDURE — G0300 HHS/HOSPICE OF LPN EA 15 MIN: HCPCS

## 2021-12-23 NOTE — PROGRESS NOTES
Results reviewed with pt. She stated that she is seeing her urologist next month and is scheduled to have a focused CT on her kidneys.

## 2021-12-23 NOTE — PROGRESS NOTES
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 She has loss of height of her spine as well. Just received spinal specialist.  We will place order. Due to the loss of eye will order a DEXA scan as well. Right-sided kidney cancer still noted on the MRI. Patient a copy of the MRI to her urologist Mane Douglass.

## 2021-12-29 ENCOUNTER — HOME CARE VISIT (OUTPATIENT)
Dept: SCHEDULING | Facility: HOME HEALTH | Age: 46
End: 2021-12-29
Payer: MEDICARE

## 2021-12-29 PROCEDURE — G0299 HHS/HOSPICE OF RN EA 15 MIN: HCPCS

## 2021-12-31 ENCOUNTER — HOME CARE VISIT (OUTPATIENT)
Dept: SCHEDULING | Facility: HOME HEALTH | Age: 46
End: 2021-12-31
Payer: MEDICARE

## 2021-12-31 VITALS
HEART RATE: 77 BPM | RESPIRATION RATE: 18 BRPM | DIASTOLIC BLOOD PRESSURE: 70 MMHG | SYSTOLIC BLOOD PRESSURE: 103 MMHG | OXYGEN SATURATION: 99 % | TEMPERATURE: 98.5 F

## 2021-12-31 PROCEDURE — G0300 HHS/HOSPICE OF LPN EA 15 MIN: HCPCS

## 2022-01-02 ENCOUNTER — HOME CARE VISIT (OUTPATIENT)
Dept: HOME HEALTH SERVICES | Facility: HOME HEALTH | Age: 47
End: 2022-01-02
Payer: MEDICARE

## 2022-01-02 VITALS
RESPIRATION RATE: 18 BRPM | TEMPERATURE: 97.3 F | DIASTOLIC BLOOD PRESSURE: 79 MMHG | OXYGEN SATURATION: 96 % | SYSTOLIC BLOOD PRESSURE: 119 MMHG | HEART RATE: 100 BPM

## 2022-01-04 ENCOUNTER — HOME CARE VISIT (OUTPATIENT)
Dept: SCHEDULING | Facility: HOME HEALTH | Age: 47
End: 2022-01-04
Payer: MEDICARE

## 2022-01-04 VITALS
RESPIRATION RATE: 20 BRPM | DIASTOLIC BLOOD PRESSURE: 83 MMHG | OXYGEN SATURATION: 99 % | SYSTOLIC BLOOD PRESSURE: 128 MMHG | TEMPERATURE: 97.1 F | HEART RATE: 104 BPM

## 2022-01-04 PROCEDURE — G0299 HHS/HOSPICE OF RN EA 15 MIN: HCPCS

## 2022-01-04 NOTE — CASE COMMUNICATION
Patient wanted to know if you could order for Physical Therapy to resume.   thanks,  Nataliya Mckeon

## 2022-01-04 NOTE — Clinical Note
i will see, she was supposed to get an MRI to see where the pain was coming from before returning.   ----- Message -----  From: Bonny Garcia RN  Sent: 1/4/2022   3:16 PM EST  To: Wilfrido Head RN      Patient wanted to know if you could order for Physical Therapy to resume.   thanks,  Mike Wren

## 2022-01-06 ENCOUNTER — HOME CARE VISIT (OUTPATIENT)
Dept: SCHEDULING | Facility: HOME HEALTH | Age: 47
End: 2022-01-06
Payer: MEDICARE

## 2022-01-06 VITALS
RESPIRATION RATE: 18 BRPM | DIASTOLIC BLOOD PRESSURE: 64 MMHG | TEMPERATURE: 97.4 F | SYSTOLIC BLOOD PRESSURE: 110 MMHG | OXYGEN SATURATION: 98 % | HEART RATE: 76 BPM

## 2022-01-06 PROCEDURE — G0300 HHS/HOSPICE OF LPN EA 15 MIN: HCPCS

## 2022-01-10 ENCOUNTER — HOME CARE VISIT (OUTPATIENT)
Dept: SCHEDULING | Facility: HOME HEALTH | Age: 47
End: 2022-01-10
Payer: MEDICARE

## 2022-01-10 PROCEDURE — A6250 SKIN SEAL PROTECT MOISTURIZR: HCPCS

## 2022-01-10 PROCEDURE — G0300 HHS/HOSPICE OF LPN EA 15 MIN: HCPCS

## 2022-01-14 ENCOUNTER — HOME CARE VISIT (OUTPATIENT)
Dept: SCHEDULING | Facility: HOME HEALTH | Age: 47
End: 2022-01-14
Payer: MEDICARE

## 2022-01-14 PROCEDURE — 400014 HH F/U

## 2022-01-14 PROCEDURE — G0300 HHS/HOSPICE OF LPN EA 15 MIN: HCPCS

## 2022-01-15 LAB
ALBUMIN SERPL-MCNC: 3.4 G/DL (ref 3.8–4.8)
ALBUMIN/GLOB SERPL: 0.9 {RATIO} (ref 1.2–2.2)
ALP SERPL-CCNC: 160 IU/L (ref 44–121)
ALT SERPL-CCNC: 32 IU/L (ref 0–32)
AST SERPL-CCNC: 25 IU/L (ref 0–40)
BASOPHILS # BLD AUTO: 0.1 X10E3/UL (ref 0–0.2)
BASOPHILS NFR BLD AUTO: 1 %
BILIRUB SERPL-MCNC: 1.6 MG/DL (ref 0–1.2)
BUN SERPL-MCNC: 23 MG/DL (ref 6–24)
BUN/CREAT SERPL: 33 (ref 9–23)
CALCIUM SERPL-MCNC: 8.9 MG/DL (ref 8.7–10.2)
CHLORIDE SERPL-SCNC: 99 MMOL/L (ref 96–106)
CO2 SERPL-SCNC: 24 MMOL/L (ref 20–29)
CREAT SERPL-MCNC: 0.69 MG/DL (ref 0.57–1)
EOSINOPHIL # BLD AUTO: 0.3 X10E3/UL (ref 0–0.4)
EOSINOPHIL NFR BLD AUTO: 2 %
ERYTHROCYTE [DISTWIDTH] IN BLOOD BY AUTOMATED COUNT: 12.4 % (ref 11.7–15.4)
GLOBULIN SER CALC-MCNC: 3.6 G/DL (ref 1.5–4.5)
GLUCOSE SERPL-MCNC: 80 MG/DL (ref 65–99)
HCT VFR BLD AUTO: 42.5 % (ref 34–46.6)
HGB BLD-MCNC: 13.7 G/DL (ref 11.1–15.9)
IMM GRANULOCYTES # BLD AUTO: 0.2 X10E3/UL (ref 0–0.1)
IMM GRANULOCYTES NFR BLD AUTO: 1 %
LYMPHOCYTES # BLD AUTO: 2.9 X10E3/UL (ref 0.7–3.1)
LYMPHOCYTES NFR BLD AUTO: 16 %
MAGNESIUM SERPL-MCNC: 2 MG/DL (ref 1.6–2.3)
MCH RBC QN AUTO: 31.8 PG (ref 26.6–33)
MCHC RBC AUTO-ENTMCNC: 32.2 G/DL (ref 31.5–35.7)
MCV RBC AUTO: 99 FL (ref 79–97)
MONOCYTES # BLD AUTO: 0.8 X10E3/UL (ref 0.1–0.9)
MONOCYTES NFR BLD AUTO: 4 %
NEUTROPHILS # BLD AUTO: 14 X10E3/UL (ref 1.4–7)
NEUTROPHILS NFR BLD AUTO: 76 %
PHOSPHATE SERPL-MCNC: 3.7 MG/DL (ref 3–4.3)
PLATELET # BLD AUTO: 363 X10E3/UL (ref 150–450)
POTASSIUM SERPL-SCNC: 3.8 MMOL/L (ref 3.5–5.2)
PREALB SERPL-MCNC: 36 MG/DL (ref 12–34)
PROT SERPL-MCNC: 7 G/DL (ref 6–8.5)
RBC # BLD AUTO: 4.31 X10E6/UL (ref 3.77–5.28)
SODIUM SERPL-SCNC: 139 MMOL/L (ref 134–144)
TRIGL SERPL-MCNC: 116 MG/DL (ref 0–149)
WBC # BLD AUTO: 18.3 X10E3/UL (ref 3.4–10.8)

## 2022-01-17 VITALS
RESPIRATION RATE: 18 BRPM | SYSTOLIC BLOOD PRESSURE: 96 MMHG | TEMPERATURE: 97.9 F | HEART RATE: 69 BPM | DIASTOLIC BLOOD PRESSURE: 73 MMHG | OXYGEN SATURATION: 99 %

## 2022-01-18 ENCOUNTER — HOME CARE VISIT (OUTPATIENT)
Dept: SCHEDULING | Facility: HOME HEALTH | Age: 47
End: 2022-01-18
Payer: MEDICARE

## 2022-01-18 PROCEDURE — G0300 HHS/HOSPICE OF LPN EA 15 MIN: HCPCS

## 2022-01-19 VITALS
TEMPERATURE: 98 F | SYSTOLIC BLOOD PRESSURE: 100 MMHG | HEART RATE: 98 BPM | DIASTOLIC BLOOD PRESSURE: 68 MMHG | OXYGEN SATURATION: 99 % | RESPIRATION RATE: 18 BRPM

## 2022-01-20 ENCOUNTER — HOME CARE VISIT (OUTPATIENT)
Dept: SCHEDULING | Facility: HOME HEALTH | Age: 47
End: 2022-01-20
Payer: MEDICARE

## 2022-01-20 PROCEDURE — G0300 HHS/HOSPICE OF LPN EA 15 MIN: HCPCS

## 2022-01-21 VITALS
DIASTOLIC BLOOD PRESSURE: 74 MMHG | OXYGEN SATURATION: 99 % | SYSTOLIC BLOOD PRESSURE: 108 MMHG | TEMPERATURE: 97.2 F | RESPIRATION RATE: 18 BRPM | HEART RATE: 89 BPM

## 2022-01-21 VITALS
HEART RATE: 88 BPM | RESPIRATION RATE: 18 BRPM | DIASTOLIC BLOOD PRESSURE: 66 MMHG | SYSTOLIC BLOOD PRESSURE: 108 MMHG | OXYGEN SATURATION: 99 % | TEMPERATURE: 98 F

## 2022-01-24 DIAGNOSIS — F41.9 ANXIETY: ICD-10-CM

## 2022-01-24 RX ORDER — CLONAZEPAM 0.5 MG/1
TABLET ORAL
Qty: 60 TABLET | Refills: 0 | Status: SHIPPED | OUTPATIENT
Start: 2022-01-24 | End: 2022-03-31

## 2022-01-26 ENCOUNTER — HOME CARE VISIT (OUTPATIENT)
Dept: SCHEDULING | Facility: HOME HEALTH | Age: 47
End: 2022-01-26
Payer: MEDICARE

## 2022-01-26 PROCEDURE — G0300 HHS/HOSPICE OF LPN EA 15 MIN: HCPCS

## 2022-01-28 ENCOUNTER — HOME CARE VISIT (OUTPATIENT)
Dept: SCHEDULING | Facility: HOME HEALTH | Age: 47
End: 2022-01-28
Payer: MEDICARE

## 2022-01-28 PROCEDURE — G0300 HHS/HOSPICE OF LPN EA 15 MIN: HCPCS

## 2022-01-30 VITALS
TEMPERATURE: 98.6 F | RESPIRATION RATE: 18 BRPM | DIASTOLIC BLOOD PRESSURE: 70 MMHG | RESPIRATION RATE: 18 BRPM | SYSTOLIC BLOOD PRESSURE: 112 MMHG | OXYGEN SATURATION: 99 % | HEART RATE: 72 BPM | DIASTOLIC BLOOD PRESSURE: 67 MMHG | TEMPERATURE: 98.1 F | HEART RATE: 88 BPM | OXYGEN SATURATION: 99 % | SYSTOLIC BLOOD PRESSURE: 124 MMHG

## 2022-01-30 PROCEDURE — A6250 SKIN SEAL PROTECT MOISTURIZR: HCPCS

## 2022-02-01 ENCOUNTER — HOME CARE VISIT (OUTPATIENT)
Dept: SCHEDULING | Facility: HOME HEALTH | Age: 47
End: 2022-02-01
Payer: MEDICARE

## 2022-02-01 PROCEDURE — G0300 HHS/HOSPICE OF LPN EA 15 MIN: HCPCS

## 2022-02-02 ENCOUNTER — HOME CARE VISIT (OUTPATIENT)
Dept: HOME HEALTH SERVICES | Facility: HOME HEALTH | Age: 47
End: 2022-02-02
Payer: MEDICARE

## 2022-02-03 VITALS
HEART RATE: 80 BPM | SYSTOLIC BLOOD PRESSURE: 120 MMHG | RESPIRATION RATE: 18 BRPM | DIASTOLIC BLOOD PRESSURE: 67 MMHG | OXYGEN SATURATION: 99 % | TEMPERATURE: 97.6 F

## 2022-02-04 ENCOUNTER — HOME CARE VISIT (OUTPATIENT)
Dept: SCHEDULING | Facility: HOME HEALTH | Age: 47
End: 2022-02-04
Payer: MEDICARE

## 2022-02-04 PROCEDURE — G0300 HHS/HOSPICE OF LPN EA 15 MIN: HCPCS

## 2022-02-06 VITALS
SYSTOLIC BLOOD PRESSURE: 112 MMHG | OXYGEN SATURATION: 99 % | HEART RATE: 90 BPM | RESPIRATION RATE: 18 BRPM | TEMPERATURE: 98.2 F | DIASTOLIC BLOOD PRESSURE: 80 MMHG

## 2022-02-07 ENCOUNTER — TELEPHONE (OUTPATIENT)
Dept: FAMILY MEDICINE CLINIC | Age: 47
End: 2022-02-07

## 2022-02-07 ENCOUNTER — HOME CARE VISIT (OUTPATIENT)
Dept: SCHEDULING | Facility: HOME HEALTH | Age: 47
End: 2022-02-07
Payer: MEDICARE

## 2022-02-07 DIAGNOSIS — R26.89 DECREASED FUNCTIONAL MOBILITY: Primary | ICD-10-CM

## 2022-02-07 DIAGNOSIS — M54.50 LOW BACK PAIN, UNSPECIFIED BACK PAIN LATERALITY, UNSPECIFIED CHRONICITY, UNSPECIFIED WHETHER SCIATICA PRESENT: ICD-10-CM

## 2022-02-07 DIAGNOSIS — M47.816 LUMBAR SPONDYLOSIS: ICD-10-CM

## 2022-02-07 PROCEDURE — G0299 HHS/HOSPICE OF RN EA 15 MIN: HCPCS

## 2022-02-07 NOTE — TELEPHONE ENCOUNTER
Patient stated that she saw Dr. Ximena Catalan and he says that she has \"really bad\" neuropathy in both lower legs and feet. He does not know why hands are tingling but there are no pinch nerves there or in her spine. He is waiting on the bloodwork. Patient also stated that she needs an order for home health for physical therapy, he says that it will be safe.      [facility]

## 2022-02-08 ENCOUNTER — HOME CARE VISIT (OUTPATIENT)
Dept: SCHEDULING | Facility: HOME HEALTH | Age: 47
End: 2022-02-08
Payer: MEDICARE

## 2022-02-08 ENCOUNTER — TELEPHONE (OUTPATIENT)
Dept: FAMILY MEDICINE CLINIC | Age: 47
End: 2022-02-08

## 2022-02-08 DIAGNOSIS — E55.9 VITAMIN D DEFICIENCY: ICD-10-CM

## 2022-02-08 DIAGNOSIS — Z78.9 ON TOTAL PARENTERAL NUTRITION (TPN): ICD-10-CM

## 2022-02-08 DIAGNOSIS — Z79.899 LONG-TERM CURRENT USE OF PROTON PUMP INHIBITOR THERAPY: Primary | ICD-10-CM

## 2022-02-08 DIAGNOSIS — M81.0 OSTEOPOROSIS, UNSPECIFIED OSTEOPOROSIS TYPE, UNSPECIFIED PATHOLOGICAL FRACTURE PRESENCE: ICD-10-CM

## 2022-02-08 LAB
ALBUMIN SERPL-MCNC: 2.6 G/DL (ref 3.8–4.8)
ALBUMIN/GLOB SERPL: 1 {RATIO} (ref 1.2–2.2)
ALP SERPL-CCNC: 127 IU/L (ref 44–121)
ALT SERPL-CCNC: 30 IU/L (ref 0–32)
AST SERPL-CCNC: 18 IU/L (ref 0–40)
BASOPHILS # BLD AUTO: 0.1 X10E3/UL (ref 0–0.2)
BASOPHILS NFR BLD AUTO: 0 %
BILIRUB SERPL-MCNC: 0.7 MG/DL (ref 0–1.2)
BUN SERPL-MCNC: 24 MG/DL (ref 6–24)
BUN/CREAT SERPL: 48 (ref 9–23)
CALCIUM SERPL-MCNC: 7.6 MG/DL (ref 8.7–10.2)
CHLORIDE SERPL-SCNC: 100 MMOL/L (ref 96–106)
CO2 SERPL-SCNC: 26 MMOL/L (ref 20–29)
CREAT SERPL-MCNC: 0.5 MG/DL (ref 0.57–1)
EOSINOPHIL # BLD AUTO: 0.3 X10E3/UL (ref 0–0.4)
EOSINOPHIL NFR BLD AUTO: 3 %
ERYTHROCYTE [DISTWIDTH] IN BLOOD BY AUTOMATED COUNT: 13.1 % (ref 11.7–15.4)
GLOBULIN SER CALC-MCNC: 2.7 G/DL (ref 1.5–4.5)
GLUCOSE SERPL-MCNC: 125 MG/DL (ref 65–99)
HCT VFR BLD AUTO: 35.9 % (ref 34–46.6)
HGB BLD-MCNC: 11.7 G/DL (ref 11.1–15.9)
IMM GRANULOCYTES # BLD AUTO: 0.1 X10E3/UL (ref 0–0.1)
IMM GRANULOCYTES NFR BLD AUTO: 1 %
LYMPHOCYTES # BLD AUTO: 2.6 X10E3/UL (ref 0.7–3.1)
LYMPHOCYTES NFR BLD AUTO: 23 %
MAGNESIUM SERPL-MCNC: 2.3 MG/DL (ref 1.6–2.3)
MCH RBC QN AUTO: 31.3 PG (ref 26.6–33)
MCHC RBC AUTO-ENTMCNC: 32.6 G/DL (ref 31.5–35.7)
MCV RBC AUTO: 96 FL (ref 79–97)
MONOCYTES # BLD AUTO: 0.9 X10E3/UL (ref 0.1–0.9)
MONOCYTES NFR BLD AUTO: 8 %
NEUTROPHILS # BLD AUTO: 7.5 X10E3/UL (ref 1.4–7)
NEUTROPHILS NFR BLD AUTO: 65 %
PHOSPHATE SERPL-MCNC: 4.1 MG/DL (ref 3–4.3)
PLATELET # BLD AUTO: 263 X10E3/UL (ref 150–450)
POTASSIUM SERPL-SCNC: 4.1 MMOL/L (ref 3.5–5.2)
PREALB SERPL-MCNC: 28 MG/DL (ref 12–34)
PROT SERPL-MCNC: 5.3 G/DL (ref 6–8.5)
RBC # BLD AUTO: 3.74 X10E6/UL (ref 3.77–5.28)
SODIUM SERPL-SCNC: 140 MMOL/L (ref 134–144)
TRIGL SERPL-MCNC: 116 MG/DL (ref 0–149)
WBC # BLD AUTO: 11.5 X10E3/UL (ref 3.4–10.8)

## 2022-02-08 PROCEDURE — G0300 HHS/HOSPICE OF LPN EA 15 MIN: HCPCS

## 2022-02-08 NOTE — TELEPHONE ENCOUNTER
Central scheduling called they said there is a flag for an ABN trigger and they need a new diagnosis code for the pts DEXA Scan . She said the pt will be there for this appt at 2:30 today and was needing it changed . Thanks ! [phone] is the direct # to call if needed . Pt called back she said she is on the way there now to have this done.

## 2022-02-09 VITALS
TEMPERATURE: 97.4 F | DIASTOLIC BLOOD PRESSURE: 68 MMHG | HEART RATE: 78 BPM | SYSTOLIC BLOOD PRESSURE: 112 MMHG | OXYGEN SATURATION: 97 % | RESPIRATION RATE: 18 BRPM

## 2022-02-10 ENCOUNTER — HOME CARE VISIT (OUTPATIENT)
Dept: SCHEDULING | Facility: HOME HEALTH | Age: 47
End: 2022-02-10
Payer: MEDICARE

## 2022-02-10 PROCEDURE — G0300 HHS/HOSPICE OF LPN EA 15 MIN: HCPCS

## 2022-02-10 PROCEDURE — 400014 HH F/U

## 2022-02-11 ENCOUNTER — HOME CARE VISIT (OUTPATIENT)
Dept: HOME HEALTH SERVICES | Facility: HOME HEALTH | Age: 47
End: 2022-02-11
Payer: MEDICARE

## 2022-02-13 VITALS
RESPIRATION RATE: 18 BRPM | RESPIRATION RATE: 18 BRPM | SYSTOLIC BLOOD PRESSURE: 108 MMHG | SYSTOLIC BLOOD PRESSURE: 110 MMHG | TEMPERATURE: 98.4 F | DIASTOLIC BLOOD PRESSURE: 76 MMHG | HEART RATE: 87 BPM | TEMPERATURE: 98 F | OXYGEN SATURATION: 99 % | DIASTOLIC BLOOD PRESSURE: 78 MMHG | HEART RATE: 80 BPM | OXYGEN SATURATION: 99 %

## 2022-02-13 PROCEDURE — A6250 SKIN SEAL PROTECT MOISTURIZR: HCPCS

## 2022-02-16 ENCOUNTER — HOME CARE VISIT (OUTPATIENT)
Dept: SCHEDULING | Facility: HOME HEALTH | Age: 47
End: 2022-02-16
Payer: MEDICARE

## 2022-02-16 PROCEDURE — G0300 HHS/HOSPICE OF LPN EA 15 MIN: HCPCS

## 2022-02-18 ENCOUNTER — HOME CARE VISIT (OUTPATIENT)
Dept: SCHEDULING | Facility: HOME HEALTH | Age: 47
End: 2022-02-18
Payer: MEDICARE

## 2022-02-18 PROCEDURE — G0300 HHS/HOSPICE OF LPN EA 15 MIN: HCPCS

## 2022-02-18 NOTE — TELEPHONE ENCOUNTER
Reviewed labs. Her calcium and protein are mildly low. Otherwise labs look good. Call and ask to see if nutritionist receive these results.

## 2022-02-18 NOTE — TELEPHONE ENCOUNTER
Results reviewed with pt. Pt stated that she heard from the nutritionist earlier this week that all labs were good.

## 2022-02-22 ENCOUNTER — HOME CARE VISIT (OUTPATIENT)
Dept: SCHEDULING | Facility: HOME HEALTH | Age: 47
End: 2022-02-22
Payer: MEDICARE

## 2022-02-22 PROCEDURE — G0300 HHS/HOSPICE OF LPN EA 15 MIN: HCPCS

## 2022-02-24 ENCOUNTER — HOME CARE VISIT (OUTPATIENT)
Dept: SCHEDULING | Facility: HOME HEALTH | Age: 47
End: 2022-02-24
Payer: MEDICARE

## 2022-02-25 VITALS
TEMPERATURE: 98.1 F | SYSTOLIC BLOOD PRESSURE: 124 MMHG | HEART RATE: 74 BPM | RESPIRATION RATE: 18 BRPM | RESPIRATION RATE: 18 BRPM | HEART RATE: 80 BPM | OXYGEN SATURATION: 99 % | TEMPERATURE: 98.7 F | SYSTOLIC BLOOD PRESSURE: 118 MMHG | RESPIRATION RATE: 18 BRPM | HEART RATE: 82 BPM | DIASTOLIC BLOOD PRESSURE: 67 MMHG | OXYGEN SATURATION: 100 % | SYSTOLIC BLOOD PRESSURE: 108 MMHG | DIASTOLIC BLOOD PRESSURE: 77 MMHG | OXYGEN SATURATION: 100 % | TEMPERATURE: 98.4 F | DIASTOLIC BLOOD PRESSURE: 78 MMHG

## 2022-03-01 ENCOUNTER — TELEPHONE (OUTPATIENT)
Dept: FAMILY MEDICINE CLINIC | Age: 47
End: 2022-03-01

## 2022-03-01 DIAGNOSIS — T78.40XD ALLERGY, SUBSEQUENT ENCOUNTER: Primary | ICD-10-CM

## 2022-03-02 ENCOUNTER — HOME CARE VISIT (OUTPATIENT)
Dept: SCHEDULING | Facility: HOME HEALTH | Age: 47
End: 2022-03-02
Payer: MEDICARE

## 2022-03-02 ENCOUNTER — TELEPHONE (OUTPATIENT)
Dept: FAMILY MEDICINE CLINIC | Age: 47
End: 2022-03-02

## 2022-03-02 VITALS
DIASTOLIC BLOOD PRESSURE: 63 MMHG | SYSTOLIC BLOOD PRESSURE: 98 MMHG | RESPIRATION RATE: 18 BRPM | TEMPERATURE: 98 F | OXYGEN SATURATION: 99 % | HEART RATE: 80 BPM

## 2022-03-02 PROCEDURE — G0300 HHS/HOSPICE OF LPN EA 15 MIN: HCPCS

## 2022-03-02 NOTE — TELEPHONE ENCOUNTER
Returned call to therapist - states she needs new order for therapy since patient has been cleared by neuro - explained to her the patient would need an office visit for evaluation since she hasn't been seen for more than 6 months

## 2022-03-04 ENCOUNTER — VIRTUAL VISIT (OUTPATIENT)
Dept: FAMILY MEDICINE CLINIC | Age: 47
End: 2022-03-04
Payer: MEDICARE

## 2022-03-04 ENCOUNTER — HOME CARE VISIT (OUTPATIENT)
Dept: SCHEDULING | Facility: HOME HEALTH | Age: 47
End: 2022-03-04
Payer: MEDICARE

## 2022-03-04 DIAGNOSIS — M54.6 CHRONIC THORACIC BACK PAIN, UNSPECIFIED BACK PAIN LATERALITY: ICD-10-CM

## 2022-03-04 DIAGNOSIS — G89.29 CHRONIC THORACIC BACK PAIN, UNSPECIFIED BACK PAIN LATERALITY: ICD-10-CM

## 2022-03-04 DIAGNOSIS — R26.9 GAIT ABNORMALITY: ICD-10-CM

## 2022-03-04 DIAGNOSIS — M54.50 LOW BACK PAIN, UNSPECIFIED BACK PAIN LATERALITY, UNSPECIFIED CHRONICITY, UNSPECIFIED WHETHER SCIATICA PRESENT: ICD-10-CM

## 2022-03-04 DIAGNOSIS — R26.89 DECREASED FUNCTIONAL MOBILITY: Primary | ICD-10-CM

## 2022-03-04 DIAGNOSIS — M47.816 LUMBAR SPONDYLOSIS: ICD-10-CM

## 2022-03-04 PROCEDURE — G0300 HHS/HOSPICE OF LPN EA 15 MIN: HCPCS

## 2022-03-04 PROCEDURE — G8427 DOCREV CUR MEDS BY ELIG CLIN: HCPCS | Performed by: STUDENT IN AN ORGANIZED HEALTH CARE EDUCATION/TRAINING PROGRAM

## 2022-03-04 PROCEDURE — G8417 CALC BMI ABV UP PARAM F/U: HCPCS | Performed by: STUDENT IN AN ORGANIZED HEALTH CARE EDUCATION/TRAINING PROGRAM

## 2022-03-04 PROCEDURE — 99213 OFFICE O/P EST LOW 20 MIN: CPT | Performed by: STUDENT IN AN ORGANIZED HEALTH CARE EDUCATION/TRAINING PROGRAM

## 2022-03-04 PROCEDURE — G8510 SCR DEP NEG, NO PLAN REQD: HCPCS | Performed by: STUDENT IN AN ORGANIZED HEALTH CARE EDUCATION/TRAINING PROGRAM

## 2022-03-04 RX ORDER — NYSTATIN 100000 [USP'U]/G
POWDER TOPICAL
Qty: 60 G | Refills: 3 | Status: SHIPPED | OUTPATIENT
Start: 2022-03-04

## 2022-03-04 RX ORDER — HEPARIN 100 UNIT/ML
300 SYRINGE INTRAVENOUS DAILY
COMMUNITY
Start: 2021-06-11

## 2022-03-04 RX ORDER — FLUTICASONE PROPIONATE 50 MCG
2 SPRAY, SUSPENSION (ML) NASAL DAILY
Qty: 1 EACH | Refills: 5 | Status: SHIPPED | OUTPATIENT
Start: 2022-03-04

## 2022-03-04 RX ORDER — MORPHINE SULFATE 30 MG/1
TABLET ORAL
COMMUNITY
Start: 2022-03-02

## 2022-03-04 RX ORDER — DIPHENHYDRAMINE HCL 12.5MG/5ML
LIQUID (ML) ORAL
COMMUNITY
End: 2022-04-07 | Stop reason: ALTCHOICE

## 2022-03-04 RX ORDER — CEPHALEXIN 500 MG/1
CAPSULE ORAL
COMMUNITY
Start: 2022-03-02 | End: 2022-04-07 | Stop reason: ALTCHOICE

## 2022-03-04 RX ORDER — GABAPENTIN 800 MG/1
800 TABLET ORAL 3 TIMES DAILY
COMMUNITY
Start: 2022-02-02 | End: 2022-09-16 | Stop reason: SDUPTHER

## 2022-03-04 RX ORDER — UREA 10 %
400 LOTION (ML) TOPICAL DAILY
COMMUNITY
End: 2022-04-09

## 2022-03-04 NOTE — PROGRESS NOTES
Chief Complaint   Patient presents with    Follow Up Chronic Condition     Medication refill. 1. Have you been to the ER, urgent care clinic since your last visit? Hospitalized since your last visit? No    2. Have you seen or consulted any other health care providers outside of the [address] [place] [address] since your last visit? Include any pap smears or colon screening. Yes Saw Dr. Rigoberto Polanco, went to [facility] for preadmission testing. 3 most recent PHQ Screens 12/3/2021   Little interest or pleasure in doing things Not at all   Feeling down, depressed, irritable, or hopeless Several days   Total Score PHQ 2 1     PLEASE [address] [facility] [phone].

## 2022-03-04 NOTE — PROGRESS NOTES
Consent: Everett Cordova, who was seen by synchronous (real-time) audio-video technology, and/or her healthcare decision maker, is aware that this patient-initiated, Telehealth encounter on 3/4/2022 is a billable service, with coverage as determined by her insurance carrier. She is aware that she may receive a bill and has provided verbal consent to proceed: YES-Consent obtained within past 12 months        712  Subjective: Everett Cordova is a 42 y.o. female who was seen for Follow Up Chronic Condition (Medication refill.)    Patient once again needs home physical therapy reordered. There was a mixup with his previously ordering was possibly faxed to the wrong location. Patient needs physical therapy for strengthening upper and lower extremities. Patient states she is walking better backslash continue to be \"wobbly\"'s. She is able to walk further and per her her she is able to reach the first stop sign your house now. She does use a walker as a precaution due to her leg weakness. Patient also states her back pain negative her little difficult for her to walk as well. She has received treatment in the past including nerve burning, patella usually last a couple of months. Physical therapy has helped with her back pain as well. Current Outpatient Medications on File Prior to Visit   Medication Sig Dispense Refill    nystatin (Nyamyc) powder apply to affected area (PERISTOMAL SKIN) twice a day to three times a day 60 g 3    cephALEXin (KEFLEX) 500 mg capsule take 1 capsule by mouth three times a day until finished      diphenhydrAMINE (BENADRYL) 12.5 mg/5 mL oral liquid Take  by mouth.  heparin, porcine, pf 100 unit/mL injection 300 Units by IntraVENous route daily.  gabapentin (NEURONTIN) 600 mg tablet Take 600 mg by mouth three (3) times daily.       morphine IR (MS IR) 30 mg tablet take 1 tablet by mouth every 8 hours if needed for severe pain      magnesium gluconate 500 mg (27 mg elemental magnesium) tab tablet Take 400 mg by mouth daily.  wjrnuxdox-Xpzuwbkp-Lovuq-W.Pet (PREPARATION H MAXIMUM STRENGTH) 0.25-1 % rectal cream Insert 1 g into rectum as needed for Hemorrhoids, Itching or Pain.  clonazePAM (KlonoPIN) 0.5 mg tablet take 1 tablet by mouth twice a day if needed for anxiety maximum daily dose of 2 60 Tablet 0    EPINEPHrine (EPIPEN) 0.3 mg/0.3 mL injection inject 0.3 milliliters ( 0.3 milligrams ) intramuscularly ONCE if. ..  (REFER TO PRESCRIPTION NOTES).  predniSONE (DELTASONE) 2.5 mg tablet Take 2.5 mg by mouth Every morning.  escitalopram oxalate (LEXAPRO) 10 mg tablet Take 5 mg by mouth daily.  potassium chloride SA (MICRO-K) 10 mEq capsule potassium chloride ER 10 mEq capsule,extended release   take 1 capsule by mouth every morning WHILE ON LASIX      morphine CR (MS CONTIN) 60 mg CR tablet Take 60 mg by mouth every eight (8) hours.  Humira,CF, Pen 40 mg/0.4 mL injection pen 40 mg by SubCUTAneous route every seven (7) days. per subsequtaneous injection Once a week       naloxone (Narcan) 4 mg/actuation nasal spray Narcan 4 mg/actuation nasal spray (spray 1 actuation via nasal for opiod overdose)       predniSONE (DELTASONE) 5 mg tablet Take 5 mg by mouth nightly.  cholecalciferol (VITAMIN D3) (1000 Units /25 mcg) tablet Take 5,000 Units by mouth daily.  furosemide (LASIX) 20 mg tablet Take 20 mg by mouth daily. TAKE AS NEED FOR FLUID RETENTION OF 3 POUNDS OR MORE UNTIL RETURN TO BASELINE WEIGHT      acetaminophen-caffeine 500-65 mg (EXCEDRINE TENSION HEADACHE) 500-65 mg tab Take 1 Tab by mouth every eight (8) hours as needed for Headache.  azaTHIOprine (IMURAN) 50 mg tablet Take 150 mg by mouth daily.  mv-min-C-glutamin-lysine-hb124 (IMMUNE SUPPORT) 250-12.5 mg chew Take 1 Tab by mouth daily.  aspirin (ASPIRIN) 325 mg tablet Take 325 mg by mouth daily.       TPN ADULT - CENTRAL 2,000 mL by IntraVENous route (central line) two (2) times a week. over 12 hours. Taper infusion rate up 1 hr/down1 hr      omeprazole (PRILOSEC) 20 mg capsule Take 20 mg by mouth daily.  sodium chloride (NORMAL SALINE FLUSH) 10-20 mL by IntraVENous Push route daily. before and after TPN and/or labs      dronabinol (MARINOL) 5 mg capsule Take 1 Cap by mouth two (2) times daily as needed (appetite). Max Daily Amount: 10 mg. (Patient taking differently: Take 1 Cap by mouth every six (6) hours as needed for Nausea.) 60 Cap 0    ferrous sulfate 325 mg (65 mg iron) tablet Take 1 Tab by mouth Daily (before breakfast). (Patient taking differently: Take 1 Tablet by mouth every other day.) 30 Tab 2    promethazine (PHENERGAN) 25 mg tablet Take 25 mg by mouth every six (6) hours as needed for Nausea.  fluticasone propionate (FLONASE) 50 mcg/actuation nasal spray 2 Sprays by Both Nostrils route daily. (Patient not taking: Reported on 3/4/2022) 1 Each 5    [DISCONTINUED] gabapentin (NEURONTIN) 300 mg capsule Take 2 Capsules by mouth three (3) times daily.  [DISCONTINUED] ciprofloxacin HCl (Cipro) 500 mg tablet Take 500 mg by mouth two (2) times a day. to start after keflex completed (Patient not taking: Reported on 3/4/2022)      [DISCONTINUED] escitalopram oxalate (LEXAPRO) 10 mg tablet Take 10 mg by mouth daily.  albuterol (PROVENTIL HFA, VENTOLIN HFA, PROAIR HFA) 90 mcg/actuation inhaler EVERY 4 HOURS AS NEEDED as needed for ALLERGIES (Patient not taking: Reported on 3/4/2022)      [DISCONTINUED] heparin, porcine, 100 unit/mL injection 300 Units by IntraVENous route daily.  [DISCONTINUED] morphine CR (MS CONTIN) 30 mg CR tablet Take 30 mg by mouth every eight (8) hours as needed for Pain. max of 3 pills daiy       [DISCONTINUED] nystatin (MYCOSTATIN) powder Apply to the affected areas (peristomal skin) 2 to 3 times daily (Patient taking differently: Apply 100,000 Units to affected area three (3) times daily. Apply . 5 -1 g to the affected areas (peristomal skin) 2 to 3 times daily  Indications: a skin infection due to the fungus Candida) 60 g 3    [DISCONTINUED] metroNIDAZOLE (FlagyL) 500 mg tablet Take 500 mg by mouth three (3) times daily. (Patient not taking: Reported on 3/4/2022)      VITAMIN B-12 5,000 mcg subl Take 5,000 mcg by mouth every month. (Patient not taking: Reported on 3/4/2022)  1     No current facility-administered medications on file prior to visit. Patient will be scheduled to have the enterocutaneous fistula fixed soon. Allergies   Allergen Reactions    Meperidine Rash and Unknown (comments)     Other reaction(s): SEVERE NAUSEA  Reaction Type: Allergy      Sulfa (Sulfonamide Antibiotics) Anaphylaxis    Carisoprodol Rash, Nausea Only and Other (comments)     Other reaction(s): RASH, ITCHING  Reaction Type: Allergy; Reaction(s): hives and itching      Sumatriptan Nausea Only    Toradol [Ketorolac Tromethamine] Nausea and Vomiting     Patient Active Problem List    Diagnosis    Prediabetes    Secondary diabetes ([address]     Due to blocked pancreatic duct. BS now well controlled. Resolved?  Pure hypercholesterolemia    Right renal mass     Follows with urology oncology      Intestinal infection     On chronic Cipro and Flagyl for this.       Vitamin D deficiency    Cyst of left kidney     S/P ablation      Chronic anemia    Small bowel fistula     Follow-up with GI, and surgery      Morbid obesity ([address]    Wound, open, anterior abdominal wall    Enterocutaneous fistula     Follow-up with GI, and surgery      Abdominal pain     Follows with Pain doctor      Perforated bowel ([address]    Crohn's disease ([address]     Follows with GI      Incarcerated ventral hernia     History of       Patient Active Problem List    Diagnosis Date Noted    Prediabetes 12/18/2021    Secondary diabetes ([address] 11/23/2020    Pure hypercholesterolemia 11/04/2020    Right renal mass 11/02/2020    Intestinal infection 11/02/2020    Vitamin D deficiency 11/02/2020    Cyst of left kidney     Chronic anemia 06/12/2018    Small bowel fistula 03/07/2018    Morbid obesity ([facility] 10/27/2017    Wound, open, anterior abdominal wall 10/16/2017    Enterocutaneous fistula 06/30/2017    Abdominal pain 06/25/2017    Perforated bowel ([facility] 06/06/2017    Crohn's disease ([facility] 08/15/2011    Incarcerated ventral hernia 08/15/2011       Review of Systems   All other systems reviewed and are negative. Objective:   Vital Signs: (As obtained by patient/caregiver at home)  There were no vitals taken for this visit. [INSTRUCTIONS:  \"[x]\" Indicates a positive item  \"[]\" Indicates a negative item  -- DELETE ALL ITEMS NOT EXAMINED]    Constitutional: [x] Appears well-developed and well-nourished [x] No apparent distress      [] Abnormal -     Mental status: [x] Alert and awake  [x] Oriented to person/place/time [x] Able to follow commands    [] Abnormal -     Eyes:   EOM    [x]  Normal    [] Abnormal -   Sclera  [x]  Normal    [] Abnormal -          Discharge [x]  None visible   [] Abnormal -     HENT: [x] Normocephalic, atraumatic  [] Abnormal -   [x] Mouth/Throat: Mucous membranes are moist    External Ears [x] Normal  [] Abnormal -    Neck: [x] No visualized mass [] Abnormal -     Pulmonary/Chest: [x] Respiratory effort normal   [x] No visualized signs of difficulty breathing or respiratory distress        [] Abnormal -        Neurological:        [x] No Facial Asymmetry (Cranial nerve 7 motor function) (limited exam due to video visit)          [x] No gaze palsy        [] Abnormal -          Skin:        [x] No significant exanthematous lesions or discoloration noted on facial skin         [] Abnormal -            Psychiatric:       [x] Normal Affect [] Abnormal -        [x] No Hallucinations    Other pertinent observable physical exam findings:-              Assessment & Plan:   Diagnoses and all orders for this visit:    1. Decreased functional mobility    2. Low back pain, unspecified back pain laterality, unspecified chronicity, unspecified whether sciatica present    3. Lumbar spondylosis    4. Chronic thoracic back pain, unspecified back pain laterality    5. Gait abnormality      Physical therapy for home health was faxed, as it has helped her, and she will benefit from continued therapy. Has helped improve her walking, strength in upper and lower extremities as well as back pain. We discussed the expected course, resolution and complications of the diagnosis(es) in detail. Medication risks, benefits, costs, interactions, and alternatives were discussed as indicated. I advised her to contact the office if her condition worsens, changes or fails to improve as anticipated. She expressed understanding with the diagnosis(es) and plan. Le Gutierrez is a 42 y.o. female being evaluated by a video visit encounter for concerns as above. A caregiver was present when appropriate. Due to this being a TeleHealth encounter (During CZPMU-67 public health emergency), evaluation of the following organ systems was limited: Vitals/Constitutional/EENT/Resp/CV/GI//MS/Neuro/Skin/Heme-Lymph-Imm. Pursuant to the emergency declaration under the [facility], [employer]5 waiver authority and the [employer] and [place] General Act, this Virtual  Visit was conducted, with patient's (and/or legal guardian's) consent, to reduce the patient's risk of exposure to COVID-19 and provide necessary medical care. Services were provided through a video synchronous discussion virtually to substitute for in-person clinic visit. Patient and provider were located at their individual homes.         Misael Mohan MD

## 2022-03-06 VITALS
SYSTOLIC BLOOD PRESSURE: 99 MMHG | TEMPERATURE: 98.1 F | DIASTOLIC BLOOD PRESSURE: 64 MMHG | HEART RATE: 76 BPM | OXYGEN SATURATION: 99 % | RESPIRATION RATE: 18 BRPM

## 2022-03-06 PROCEDURE — A6250 SKIN SEAL PROTECT MOISTURIZR: HCPCS

## 2022-03-08 ENCOUNTER — HOME CARE VISIT (OUTPATIENT)
Dept: SCHEDULING | Facility: HOME HEALTH | Age: 47
End: 2022-03-08
Payer: MEDICARE

## 2022-03-08 PROCEDURE — G0300 HHS/HOSPICE OF LPN EA 15 MIN: HCPCS

## 2022-03-09 LAB
ALBUMIN SERPL-MCNC: 2.9 G/DL
ALBUMIN/GLOB SERPL: 0.9 {RATIO} (ref 1.2–2.2)
ALP SERPL-CCNC: 114 IU/L (ref 44–121)
ALT SERPL-CCNC: 13 IU/L
AST SERPL-CCNC: 13 IU/L (ref 0–40)
BASOPHILS # BLD AUTO: 0.1 X10E3/UL
BASOPHILS NFR BLD AUTO: 1 %
BILIRUB SERPL-MCNC: 0.5 MG/DL (ref 0–1.2)
BUN SERPL-MCNC: 15 MG/DL
BUN/CREAT SERPL: 23
CALCIUM SERPL-MCNC: 8.5 MG/DL
CHLORIDE SERPL-SCNC: 99 MMOL/L (ref 96–106)
CO2 SERPL-SCNC: 23 MMOL/L (ref 20–29)
CREAT SERPL-MCNC: 0.64 MG/DL
EGFR: 112 ML/MIN/1.73
EOSINOPHIL # BLD AUTO: 0.2 X10E3/UL
EOSINOPHIL NFR BLD AUTO: 2 %
ERYTHROCYTE [DISTWIDTH] IN BLOOD BY AUTOMATED COUNT: 12.9 %
GLOBULIN SER CALC-MCNC: 3.2 G/DL (ref 1.5–4.5)
GLUCOSE SERPL-MCNC: 113 MG/DL (ref 65–99)
HCT VFR BLD AUTO: 38.2 %
HGB BLD-MCNC: 12 G/DL
IMM GRANULOCYTES # BLD AUTO: 0.1 X10E3/UL
IMM GRANULOCYTES NFR BLD AUTO: 1 %
LYMPHOCYTES # BLD AUTO: 2.9 X10E3/UL
LYMPHOCYTES NFR BLD AUTO: 28 %
MAGNESIUM SERPL-MCNC: 1.7 MG/DL (ref 1.6–2.3)
MCH RBC QN AUTO: 30.5 PG
MCHC RBC AUTO-ENTMCNC: 31.4 G/DL
MCV RBC AUTO: 97 FL
MONOCYTES # BLD AUTO: 0.7 X10E3/UL
MONOCYTES NFR BLD AUTO: 7 %
MORPHOLOGY BLD-IMP: NORMAL
NEUTROPHILS # BLD AUTO: 6.6 X10E3/UL
NEUTROPHILS NFR BLD AUTO: 61 %
PHOSPHATE SERPL-MCNC: 3 MG/DL
PLATELET # BLD AUTO: 291 X10E3/UL (ref 150–450)
POTASSIUM SERPL-SCNC: 4.1 MMOL/L (ref 3.5–5.2)
PREALB SERPL-MCNC: 27 MG/DL (ref 12–34)
PROT SERPL-MCNC: 6.1 G/DL (ref 6–8.5)
RBC # BLD AUTO: 3.94 X10E6/UL
SODIUM SERPL-SCNC: 139 MMOL/L (ref 134–144)
TRIGL SERPL-MCNC: 131 MG/DL (ref ?–150)
WBC # BLD AUTO: 10.6 X10E3/UL (ref 3.4–10.8)

## 2022-03-10 ENCOUNTER — HOME CARE VISIT (OUTPATIENT)
Dept: SCHEDULING | Facility: HOME HEALTH | Age: 47
End: 2022-03-10
Payer: MEDICARE

## 2022-03-10 PROCEDURE — A4367 OSTOMY BELT: HCPCS

## 2022-03-10 PROCEDURE — MED10158 APPLICATOR, COTTON-TIP, WOOD, 6, STRL

## 2022-03-10 PROCEDURE — G0300 HHS/HOSPICE OF LPN EA 15 MIN: HCPCS

## 2022-03-10 PROCEDURE — A4247 BETADINE/IODINE SWABS/WIPES: HCPCS

## 2022-03-10 PROCEDURE — MED10197 PASTE,CALAZIME, REMEDY IST PHYTO 4OZ

## 2022-03-13 VITALS
RESPIRATION RATE: 18 BRPM | OXYGEN SATURATION: 99 % | DIASTOLIC BLOOD PRESSURE: 71 MMHG | DIASTOLIC BLOOD PRESSURE: 70 MMHG | TEMPERATURE: 97.9 F | RESPIRATION RATE: 18 BRPM | HEART RATE: 82 BPM | SYSTOLIC BLOOD PRESSURE: 112 MMHG | SYSTOLIC BLOOD PRESSURE: 121 MMHG | OXYGEN SATURATION: 100 % | HEART RATE: 87 BPM | TEMPERATURE: 98 F

## 2022-03-16 ENCOUNTER — HOME CARE VISIT (OUTPATIENT)
Dept: SCHEDULING | Facility: HOME HEALTH | Age: 47
End: 2022-03-16
Payer: MEDICARE

## 2022-03-16 PROCEDURE — 400014 HH F/U

## 2022-03-16 PROCEDURE — G0300 HHS/HOSPICE OF LPN EA 15 MIN: HCPCS

## 2022-03-18 ENCOUNTER — HOME CARE VISIT (OUTPATIENT)
Dept: SCHEDULING | Facility: HOME HEALTH | Age: 47
End: 2022-03-18
Payer: MEDICARE

## 2022-03-18 PROBLEM — R10.9 ABDOMINAL PAIN: Status: ACTIVE | Noted: 2017-06-25

## 2022-03-18 PROBLEM — A09 INTESTINAL INFECTION: Status: ACTIVE | Noted: 2020-11-02

## 2022-03-18 PROBLEM — K63.1 PERFORATED BOWEL (HCC): Status: ACTIVE | Noted: 2017-06-06

## 2022-03-18 PROBLEM — N28.89 RIGHT RENAL MASS: Status: ACTIVE | Noted: 2020-11-02

## 2022-03-18 PROCEDURE — G0300 HHS/HOSPICE OF LPN EA 15 MIN: HCPCS

## 2022-03-19 PROBLEM — E78.00 PURE HYPERCHOLESTEROLEMIA: Status: ACTIVE | Noted: 2020-11-04

## 2022-03-19 PROBLEM — S31.109A WOUND, OPEN, ANTERIOR ABDOMINAL WALL: Status: ACTIVE | Noted: 2017-10-16

## 2022-03-19 PROBLEM — E66.01 MORBID OBESITY (HCC): Status: ACTIVE | Noted: 2017-10-27

## 2022-03-19 PROBLEM — E55.9 VITAMIN D DEFICIENCY: Status: ACTIVE | Noted: 2020-11-02

## 2022-03-19 PROBLEM — D64.9 CHRONIC ANEMIA: Status: ACTIVE | Noted: 2018-06-12

## 2022-03-19 PROBLEM — K63.2 ENTEROCUTANEOUS FISTULA: Status: ACTIVE | Noted: 2017-06-30

## 2022-03-19 PROBLEM — K63.2 SMALL BOWEL FISTULA: Status: ACTIVE | Noted: 2018-03-07

## 2022-03-19 PROBLEM — R73.03 PREDIABETES: Status: ACTIVE | Noted: 2021-12-18

## 2022-03-20 PROBLEM — E13.9 SECONDARY DIABETES (HCC): Status: ACTIVE | Noted: 2020-11-23

## 2022-03-21 ENCOUNTER — HOME CARE VISIT (OUTPATIENT)
Dept: SCHEDULING | Facility: HOME HEALTH | Age: 47
End: 2022-03-21
Payer: MEDICARE

## 2022-03-21 VITALS
TEMPERATURE: 96.6 F | SYSTOLIC BLOOD PRESSURE: 110 MMHG | HEART RATE: 60 BPM | DIASTOLIC BLOOD PRESSURE: 60 MMHG | RESPIRATION RATE: 16 BRPM

## 2022-03-21 VITALS
RESPIRATION RATE: 18 BRPM | SYSTOLIC BLOOD PRESSURE: 122 MMHG | TEMPERATURE: 97.4 F | DIASTOLIC BLOOD PRESSURE: 70 MMHG | OXYGEN SATURATION: 99 % | HEART RATE: 77 BPM

## 2022-03-21 VITALS
SYSTOLIC BLOOD PRESSURE: 103 MMHG | OXYGEN SATURATION: 99 % | HEART RATE: 64 BPM | RESPIRATION RATE: 18 BRPM | DIASTOLIC BLOOD PRESSURE: 66 MMHG

## 2022-03-21 PROCEDURE — G0151 HHCP-SERV OF PT,EA 15 MIN: HCPCS

## 2022-03-22 ENCOUNTER — HOME CARE VISIT (OUTPATIENT)
Dept: SCHEDULING | Facility: HOME HEALTH | Age: 47
End: 2022-03-22
Payer: MEDICARE

## 2022-03-22 PROCEDURE — A4406 PECTIN BASED OSTOMY PASTE: HCPCS

## 2022-03-22 PROCEDURE — A6250 SKIN SEAL PROTECT MOISTURIZR: HCPCS

## 2022-03-22 PROCEDURE — G0300 HHS/HOSPICE OF LPN EA 15 MIN: HCPCS

## 2022-03-22 PROCEDURE — A6216 NON-STERILE GAUZE<=16 SQ IN: HCPCS

## 2022-03-24 ENCOUNTER — HOME CARE VISIT (OUTPATIENT)
Dept: SCHEDULING | Facility: HOME HEALTH | Age: 47
End: 2022-03-24
Payer: MEDICARE

## 2022-03-24 PROCEDURE — G0300 HHS/HOSPICE OF LPN EA 15 MIN: HCPCS

## 2022-03-25 ENCOUNTER — HOME CARE VISIT (OUTPATIENT)
Dept: SCHEDULING | Facility: HOME HEALTH | Age: 47
End: 2022-03-25
Payer: MEDICARE

## 2022-03-25 VITALS
RESPIRATION RATE: 18 BRPM | HEART RATE: 66 BPM | TEMPERATURE: 97.5 F | DIASTOLIC BLOOD PRESSURE: 72 MMHG | SYSTOLIC BLOOD PRESSURE: 112 MMHG | OXYGEN SATURATION: 99 %

## 2022-03-25 PROCEDURE — G0151 HHCP-SERV OF PT,EA 15 MIN: HCPCS

## 2022-03-26 VITALS
SYSTOLIC BLOOD PRESSURE: 108 MMHG | DIASTOLIC BLOOD PRESSURE: 65 MMHG | TEMPERATURE: 98 F | TEMPERATURE: 98.1 F | SYSTOLIC BLOOD PRESSURE: 112 MMHG | OXYGEN SATURATION: 99 % | HEART RATE: 70 BPM | RESPIRATION RATE: 18 BRPM | RESPIRATION RATE: 16 BRPM | OXYGEN SATURATION: 99 % | DIASTOLIC BLOOD PRESSURE: 60 MMHG | HEART RATE: 68 BPM

## 2022-03-29 ENCOUNTER — HOSPITAL ENCOUNTER (OUTPATIENT)
Dept: MAMMOGRAPHY | Age: 47
Discharge: HOME | End: 2022-03-29
Payer: MEDICARE

## 2022-03-29 DIAGNOSIS — M81.0 OSTEOPOROSIS, UNSPECIFIED OSTEOPOROSIS TYPE, UNSPECIFIED PATHOLOGICAL FRACTURE PRESENCE: ICD-10-CM

## 2022-03-29 PROCEDURE — 77080 DXA BONE DENSITY AXIAL: CPT

## 2022-03-30 ENCOUNTER — HOME CARE VISIT (OUTPATIENT)
Dept: SCHEDULING | Facility: HOME HEALTH | Age: 47
End: 2022-03-30
Payer: MEDICARE

## 2022-03-30 PROCEDURE — G0300 HHS/HOSPICE OF LPN EA 15 MIN: HCPCS

## 2022-03-31 ENCOUNTER — HOME CARE VISIT (OUTPATIENT)
Dept: SCHEDULING | Facility: HOME HEALTH | Age: 47
End: 2022-03-31
Payer: MEDICARE

## 2022-03-31 ENCOUNTER — OFFICE VISIT (OUTPATIENT)
Dept: FAMILY MEDICINE CLINIC | Age: 47
End: 2022-03-31
Payer: MEDICARE

## 2022-03-31 VITALS
WEIGHT: 244 LBS | RESPIRATION RATE: 16 BRPM | DIASTOLIC BLOOD PRESSURE: 89 MMHG | SYSTOLIC BLOOD PRESSURE: 135 MMHG | HEIGHT: 64 IN | TEMPERATURE: 97.3 F | BODY MASS INDEX: 41.66 KG/M2 | HEART RATE: 95 BPM | OXYGEN SATURATION: 97 %

## 2022-03-31 DIAGNOSIS — Z01.810 PREOP CARDIOVASCULAR EXAM: Primary | ICD-10-CM

## 2022-03-31 PROCEDURE — G8417 CALC BMI ABV UP PARAM F/U: HCPCS | Performed by: STUDENT IN AN ORGANIZED HEALTH CARE EDUCATION/TRAINING PROGRAM

## 2022-03-31 PROCEDURE — 99213 OFFICE O/P EST LOW 20 MIN: CPT | Performed by: STUDENT IN AN ORGANIZED HEALTH CARE EDUCATION/TRAINING PROGRAM

## 2022-03-31 PROCEDURE — G8427 DOCREV CUR MEDS BY ELIG CLIN: HCPCS | Performed by: STUDENT IN AN ORGANIZED HEALTH CARE EDUCATION/TRAINING PROGRAM

## 2022-03-31 PROCEDURE — G8510 SCR DEP NEG, NO PLAN REQD: HCPCS | Performed by: STUDENT IN AN ORGANIZED HEALTH CARE EDUCATION/TRAINING PROGRAM

## 2022-03-31 NOTE — PROGRESS NOTES
Chief Complaint   Patient presents with    Physical     for dental procedures 4/13/2022 Dr Nabila Cabrera     Visit Vitals  /89 (BP 1 Location: Left upper arm, BP Patient Position: Sitting)   Pulse 95   Temp 97.3 °F (36.3 °C) (Axillary)   Resp 16   Ht 5' 4\" (1.626 m)   Wt 244 lb (110.7 kg)   SpO2 97%   BMI 41.88 kg/m²

## 2022-03-31 NOTE — PROGRESS NOTES
Subjective:     Chief Complaint   Patient presents with    Physical     for dental procedures 4/13/2022 Dr Brad Reid     HPI:  Latoya Contreras is a 42 y.o. female for preop physical.  She is scheduled to have dental work done. She will placed on there IV sedation. No intubation will be done. CRNA will be present. She had a similar procedure done 3 years ago without complications. She has no heart disease. She does have a past history of jaundice is a see in the problem list.  She denies any chest pain or palpitations. Patient does have shortness of breath when walking. Preoperative Risk assessment using 2014 ACC/AHA guidelines     1) Emergent procedure No    2) Active Cardiac Condition No (ACS, recent AMI, decompensated CHF with NYHA IV, significant arrhythmias, severe valvular disease, poorly controlled HTN)    3) Risk procedure      [ X] Low (0-1% of adverse cardiac events): superficial procedures, cataract/ breast surgery, endoscopy, superficial skin, ambulatory procedures. [ ] Intermediate (1-5%): carotid endarterectomy, intraperitoneal/intrathoracic surgery, orthopedic surgery, head and neck surgery, and prostate surgery. [ ] High (> 5%): vascular or aortic repair surgery. 4) RCRI (revised cardiac risk index)     [ ] Cardiovascular disease   [place] ] Stroke   [ ] Heart failure   [ ] DM requiring insulin    [ ] Cr > 2.0    5) At least moderate functional capacity with >4 MET's w/o symptoms No              [ ] Climbing 1-2 flights or stairs   [ ] walking a block at a brisk pace    [ ] House chores    [ ] Recreational activites: golf, bowling, dancing, double tennis. .. 6/ Will further testing impact decisions ? yes          Patient Active Problem List    Diagnosis    Prediabetes    Secondary diabetes ([address]     Due to blocked pancreatic duct. BS now well controlled. Resolved?       Pure hypercholesterolemia    Right renal mass     Follows with urology oncology  Intestinal infection     On chronic Cipro and Flagyl for this.  Vitamin D deficiency    Cyst of left kidney     S/P ablation      Chronic anemia    Small bowel fistula     Follow-up with GI, and surgery      Morbid obesity ([address]    Wound, open, anterior abdominal wall    Enterocutaneous fistula     Follow-up with GI, and surgery      Abdominal pain     Follows with Pain doctor      Perforated bowel ([address]    Crohn's disease ([address]     Follows with GI      Incarcerated ventral hernia     History of       Past Medical History:   Diagnosis Date    Adverse effect of anesthesia     WOKE DURING SURGERY    Arthritis     Blood clot in vein 2017    STOMACH    Crohn's disease ([address] 8/15/2011    Cyst of left kidney     DVT (deep venous thrombosis) ([address] 8/15/2011    LEFT LEG    Edema     GENERALIZED R/T TPN; WAIST DOWN    Incarcerated ventral hernia 8/15/2011    Lupus ([address]     Lyme disease     Psychiatric disorder     ANXIETY    Right flank pain 8/22/2011    Seizures ([address] 1990    LAST AT AGE 17    Small bowel ischemia ([address] 6/28/2017    CT abd/pelvis 6/28/17 1. The stomach and proximal small bowel loops are distended. There is a loop of small bowel in the midabdomen which is mildly dilated and does not demonstrate enhancement in the wall and there is suspicion of pneumatosis and this leads to a loop of small bowel which demonstrates thickening of the wall and findings are suspicious for ischemia. 2. There is extensive mesenteric     Stroke 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 There is nonocclusive thrombus in the SMA.      Thyroid disease     HYPO-NO MEDS    Uncertain tumor of kidney and ureter, right      Family History   Problem Relation Age of Onset    Hypertension Father     Stroke Father     Other Father         arthritis    OSTEOARTHRITIS Father  Hypertension Mother     OSTEOARTHRITIS Mother     Heart Attack Mother     Anesth Problems Neg Hx       reports that she quit smoking about 5 years ago. She has a 16.00 pack-year smoking history. She has quit using smokeless tobacco. She reports that she does not drink alcohol and does not use drugs. Current Outpatient Medications on File Prior to Visit   Medication Sig Dispense Refill    nystatin (Nyamyc) powder apply to affected area (PERISTOMAL SKIN) twice a day to three times a day 60 g 3    fluticasone propionate (FLONASE) 50 mcg/actuation nasal spray 2 Sprays by Both Nostrils route daily. 1 Each 5    cephALEXin (KEFLEX) 500 mg capsule take 1 capsule by mouth three times a day until finished      diphenhydrAMINE (BENADRYL) 12.5 mg/5 mL oral liquid Take  by mouth.  heparin, porcine, pf 100 unit/mL injection 300 Units by IntraVENous route daily.  gabapentin (NEURONTIN) 600 mg tablet Take 600 mg by mouth three (3) times daily.  morphine IR (MS IR) 30 mg tablet take 1 tablet by mouth every 8 hours if needed for severe pain      magnesium gluconate 500 mg (27 mg elemental magnesium) tab tablet Take 400 mg by mouth daily.  jqhxcfcaj-Qnubsjap-Fjbdx-W.Pet (PREPARATION H MAXIMUM STRENGTH) 0.25-1 % rectal cream Insert 1 g into rectum as needed for Hemorrhoids, Itching or Pain.  EPINEPHrine (EPIPEN) 0.3 mg/0.3 mL injection inject 0.3 milliliters ( 0.3 milligrams ) intramuscularly ONCE if. ..  (REFER TO PRESCRIPTION NOTES).  predniSONE (DELTASONE) 2.5 mg tablet Take 2.5 mg by mouth Every morning.  albuterol (PROVENTIL HFA, VENTOLIN HFA, PROAIR HFA) 90 mcg/actuation inhaler EVERY 4 HOURS AS NEEDED as needed for ALLERGIES      escitalopram oxalate (LEXAPRO) 10 mg tablet Take 5 mg by mouth daily.       potassium chloride SA (MICRO-K) 10 mEq capsule potassium chloride ER 10 mEq capsule,extended release   take 1 capsule by mouth every morning WHILE ON LASIX      morphine CR (MS CONTIN) 60 mg CR tablet Take 60 mg by mouth every eight (8) hours.  Humira,CF, Pen 40 mg/0.4 mL injection pen 40 mg by SubCUTAneous route every seven (7) days. per subsequtaneous injection Once a week       naloxone (Narcan) 4 mg/actuation nasal spray Narcan 4 mg/actuation nasal spray (spray 1 actuation via nasal for opiod overdose)       predniSONE (DELTASONE) 5 mg tablet Take 5 mg by mouth nightly.  cholecalciferol (VITAMIN D3) (1000 Units /25 mcg) tablet Take 5,000 Units by mouth daily.  furosemide (LASIX) 20 mg tablet Take 20 mg by mouth daily. TAKE AS NEED FOR FLUID RETENTION OF 3 POUNDS OR MORE UNTIL RETURN TO BASELINE WEIGHT      acetaminophen-caffeine 500-65 mg (EXCEDRINE TENSION HEADACHE) 500-65 mg tab Take 1 Tab by mouth every eight (8) hours as needed for Headache.  azaTHIOprine (IMURAN) 50 mg tablet Take 150 mg by mouth daily.  aspirin (ASPIRIN) 325 mg tablet Take 325 mg by mouth daily.  TPN ADULT - CENTRAL 2,000 mL by IntraVENous route (central line) two (2) times a week. over 12 hours. Taper infusion rate up 1 hr/down1 hr      omeprazole (PRILOSEC) 20 mg capsule Take 20 mg by mouth daily.  sodium chloride (NORMAL SALINE FLUSH) 10-20 mL by IntraVENous Push route daily. before and after TPN and/or labs      dronabinol (MARINOL) 5 mg capsule Take 1 Cap by mouth two (2) times daily as needed (appetite). Max Daily Amount: 10 mg. (Patient taking differently: Take 1 Cap by mouth every six (6) hours as needed for Nausea.) 60 Cap 0    ferrous sulfate 325 mg (65 mg iron) tablet Take 1 Tab by mouth Daily (before breakfast). (Patient taking differently: Take 1 Tablet by mouth every other day.) 30 Tab 2    promethazine (PHENERGAN) 25 mg tablet Take 25 mg by mouth every six (6) hours as needed for Nausea.       [DISCONTINUED] clonazePAM (KlonoPIN) 0.5 mg tablet take 1 tablet by mouth twice a day if needed for anxiety maximum daily dose of 2 (Patient not taking: Reported on 3/31/2022) 60 Tablet 0    [DISCONTINUED] mv-min-C-glutamin-lysine-hb124 (IMMUNE SUPPORT) 250-12.5 mg chew Take 1 Tab by mouth daily. (Patient not taking: Reported on 3/31/2022)      [DISCONTINUED] VITAMIN B-12 5,000 mcg subl Take 5,000 mcg by mouth every month. (Patient not taking: Reported on 3/4/2022)  1     No current facility-administered medications on file prior to visit. Allergies   Allergen Reactions    Meperidine Rash and Unknown (comments)     Other reaction(s): SEVERE NAUSEA  Reaction Type: Allergy      Sulfa (Sulfonamide Antibiotics) Anaphylaxis    Carisoprodol Rash, Nausea Only and Other (comments)     Other reaction(s): RASH, ITCHING  Reaction Type: Allergy; Reaction(s): hives and itching      Sumatriptan Nausea Only    Toradol [Ketorolac Tromethamine] Nausea and Vomiting     Review of Systems   Constitutional: Negative for fever. Respiratory: Negative for shortness of breath. Cardiovascular: Negative for chest pain. Objective:     Vitals:    03/31/22 1355   BP: 135/89   Pulse: 95   Resp: 16   Temp: 97.3 °F (36.3 °C)   TempSrc: Axillary   SpO2: 97%   Weight: 244 lb (110.7 kg)   Height: 5' 4\" (1.626 m)     Physical Exam  Vitals reviewed. Constitutional:       Appearance: Normal appearance. She is obese. HENT:      Head: Normocephalic and atraumatic. Cardiovascular:      Rate and Rhythm: Normal rate and regular rhythm. Heart sounds: Normal heart sounds. Pulmonary:      Effort: Pulmonary effort is normal.      Breath sounds: Normal breath sounds. Abdominal:      Tenderness: There is no abdominal tenderness. Comments: Ostomy bag in place. Intestine pink and healthy looking. Neurological:      Mental Status: She is alert and oriented to person, place, and time. Psychiatric:         Behavior: Behavior normal.            Assessment/Plan:       1.  Preop cardiovascular exam        -     Due to patient's multiple chronic conditions, and inactivity despite having a low RCRI score, cardiology was consulted for preop physical, and possible stress test.  -     REFERRAL TO CARDIOLOGY      Follow-up and Dispositions    · Return in about 4 months (around 7/31/2022) for Chronic Conditions. Follow-up and Dispositions    · Return in about 4 months (around 7/31/2022) for Chronic Conditions.           Calla Primrose, MD

## 2022-04-01 ENCOUNTER — HOME CARE VISIT (OUTPATIENT)
Dept: SCHEDULING | Facility: HOME HEALTH | Age: 47
End: 2022-04-01
Payer: MEDICARE

## 2022-04-01 VITALS
HEART RATE: 104 BPM | SYSTOLIC BLOOD PRESSURE: 90 MMHG | DIASTOLIC BLOOD PRESSURE: 60 MMHG | RESPIRATION RATE: 18 BRPM | TEMPERATURE: 98.2 F | OXYGEN SATURATION: 99 %

## 2022-04-01 VITALS
OXYGEN SATURATION: 98 % | TEMPERATURE: 97 F | RESPIRATION RATE: 18 BRPM | HEART RATE: 75 BPM | DIASTOLIC BLOOD PRESSURE: 65 MMHG | SYSTOLIC BLOOD PRESSURE: 99 MMHG

## 2022-04-01 PROCEDURE — G0300 HHS/HOSPICE OF LPN EA 15 MIN: HCPCS

## 2022-04-01 PROCEDURE — G0151 HHCP-SERV OF PT,EA 15 MIN: HCPCS

## 2022-04-03 NOTE — PROGRESS NOTES
I reviewed the finding of osteoporosis with patient during appointment. But due to upcoming mouth dental surgery we will hold off on starting Fosamax due to risk factor for osteonecrosis of the jaw. Advised appropriate vitamin D and calcium regimen.

## 2022-04-04 ENCOUNTER — HOME CARE VISIT (OUTPATIENT)
Dept: SCHEDULING | Facility: HOME HEALTH | Age: 47
End: 2022-04-04
Payer: MEDICARE

## 2022-04-04 ENCOUNTER — TELEPHONE (OUTPATIENT)
Dept: CARDIOLOGY CLINIC | Age: 47
End: 2022-04-04

## 2022-04-04 PROCEDURE — G0151 HHCP-SERV OF PT,EA 15 MIN: HCPCS

## 2022-04-04 NOTE — TELEPHONE ENCOUNTER
Patient is calling as a new patient , she would like to see if there an earlier appointment she can have due to her having some dental work done on 04.13.22 and would need a stress test before then. Please Advise .           [phone]

## 2022-04-05 ENCOUNTER — DOCUMENTATION ONLY (OUTPATIENT)
Dept: CARDIOLOGY CLINIC | Age: 47
End: 2022-04-05

## 2022-04-05 ENCOUNTER — HOME CARE VISIT (OUTPATIENT)
Dept: SCHEDULING | Facility: HOME HEALTH | Age: 47
End: 2022-04-05
Payer: MEDICARE

## 2022-04-05 VITALS
OXYGEN SATURATION: 99 % | SYSTOLIC BLOOD PRESSURE: 105 MMHG | HEART RATE: 63 BPM | TEMPERATURE: 97.8 F | DIASTOLIC BLOOD PRESSURE: 70 MMHG | RESPIRATION RATE: 18 BRPM

## 2022-04-05 VITALS
RESPIRATION RATE: 18 BRPM | RESPIRATION RATE: 18 BRPM | OXYGEN SATURATION: 99 % | HEART RATE: 80 BPM | TEMPERATURE: 98 F | DIASTOLIC BLOOD PRESSURE: 78 MMHG | HEART RATE: 87 BPM | DIASTOLIC BLOOD PRESSURE: 66 MMHG | SYSTOLIC BLOOD PRESSURE: 108 MMHG | SYSTOLIC BLOOD PRESSURE: 112 MMHG | TEMPERATURE: 98.4 F | OXYGEN SATURATION: 99 %

## 2022-04-05 PROCEDURE — A6250 SKIN SEAL PROTECT MOISTURIZR: HCPCS

## 2022-04-05 PROCEDURE — G0300 HHS/HOSPICE OF LPN EA 15 MIN: HCPCS

## 2022-04-05 NOTE — PROGRESS NOTES
Requested information from Dr. Iris Mccoy (dentisit) office. Patient is having dental work done on 4/13/2022.

## 2022-04-06 ENCOUNTER — HOME CARE VISIT (OUTPATIENT)
Dept: SCHEDULING | Facility: HOME HEALTH | Age: 47
End: 2022-04-06
Payer: MEDICARE

## 2022-04-06 VITALS
SYSTOLIC BLOOD PRESSURE: 110 MMHG | TEMPERATURE: 96.9 F | OXYGEN SATURATION: 99 % | RESPIRATION RATE: 18 BRPM | DIASTOLIC BLOOD PRESSURE: 75 MMHG | HEART RATE: 72 BPM

## 2022-04-06 LAB
ALBUMIN SERPL-MCNC: 3.1 G/DL (ref 3.8–4.8)
ALBUMIN/GLOB SERPL: 1.2 {RATIO} (ref 1.2–2.2)
ALP SERPL-CCNC: 104 IU/L (ref 44–121)
ALT SERPL-CCNC: 20 IU/L (ref 0–32)
AST SERPL-CCNC: 10 IU/L (ref 0–40)
BASOPHILS # BLD AUTO: 0.1 X10E3/UL (ref 0–0.2)
BASOPHILS NFR BLD AUTO: 1 %
BILIRUB SERPL-MCNC: 0.3 MG/DL (ref 0–1.2)
BUN SERPL-MCNC: 15 MG/DL (ref 6–24)
BUN/CREAT SERPL: 29 (ref 9–23)
CALCIUM SERPL-MCNC: 8.4 MG/DL (ref 8.7–10.2)
CHLORIDE SERPL-SCNC: 102 MMOL/L (ref 96–106)
CO2 SERPL-SCNC: 25 MMOL/L (ref 20–29)
CREAT SERPL-MCNC: 0.52 MG/DL (ref 0.57–1)
EGFR: 117 ML/MIN/1.73
EOSINOPHIL # BLD AUTO: 0.2 X10E3/UL (ref 0–0.4)
EOSINOPHIL NFR BLD AUTO: 2 %
ERYTHROCYTE [DISTWIDTH] IN BLOOD BY AUTOMATED COUNT: 13.8 % (ref 11.7–15.4)
GLOBULIN SER CALC-MCNC: 2.5 G/DL (ref 1.5–4.5)
GLUCOSE SERPL-MCNC: 119 MG/DL (ref 65–99)
HCT VFR BLD AUTO: 37.5 % (ref 34–46.6)
HGB BLD-MCNC: 11.7 G/DL (ref 11.1–15.9)
IMM GRANULOCYTES # BLD AUTO: 0.1 X10E3/UL (ref 0–0.1)
IMM GRANULOCYTES NFR BLD AUTO: 1 %
LYMPHOCYTES # BLD AUTO: 2.8 X10E3/UL (ref 0.7–3.1)
LYMPHOCYTES NFR BLD AUTO: 31 %
MAGNESIUM SERPL-MCNC: 1.8 MG/DL (ref 1.6–2.3)
MCH RBC QN AUTO: 30.5 PG (ref 26.6–33)
MCHC RBC AUTO-ENTMCNC: 31.2 G/DL (ref 31.5–35.7)
MCV RBC AUTO: 98 FL (ref 79–97)
MONOCYTES # BLD AUTO: 0.8 X10E3/UL (ref 0.1–0.9)
MONOCYTES NFR BLD AUTO: 8 %
MORPHOLOGY BLD-IMP: ABNORMAL
NEUTROPHILS # BLD AUTO: 5.1 X10E3/UL (ref 1.4–7)
NEUTROPHILS NFR BLD AUTO: 57 %
PHOSPHATE SERPL-MCNC: 2.8 MG/DL (ref 3–4.3)
PLATELET # BLD AUTO: 248 X10E3/UL (ref 150–450)
POTASSIUM SERPL-SCNC: 4.2 MMOL/L (ref 3.5–5.2)
PREALB SERPL-MCNC: 24 MG/DL (ref 12–34)
PROT SERPL-MCNC: 5.6 G/DL (ref 6–8.5)
RBC # BLD AUTO: 3.84 X10E6/UL (ref 3.77–5.28)
SODIUM SERPL-SCNC: 141 MMOL/L (ref 134–144)
TRIGL SERPL-MCNC: 118 MG/DL (ref 0–149)
WBC # BLD AUTO: 8.9 X10E3/UL (ref 3.4–10.8)

## 2022-04-06 PROCEDURE — G0151 HHCP-SERV OF PT,EA 15 MIN: HCPCS

## 2022-04-07 ENCOUNTER — OFFICE VISIT (OUTPATIENT)
Dept: CARDIOLOGY CLINIC | Age: 47
End: 2022-04-07
Payer: MEDICARE

## 2022-04-07 ENCOUNTER — HOME CARE VISIT (OUTPATIENT)
Dept: SCHEDULING | Facility: HOME HEALTH | Age: 47
End: 2022-04-07
Payer: MEDICARE

## 2022-04-07 VITALS
OXYGEN SATURATION: 99 % | HEART RATE: 87 BPM | RESPIRATION RATE: 18 BRPM | RESPIRATION RATE: 18 BRPM | DIASTOLIC BLOOD PRESSURE: 82 MMHG | SYSTOLIC BLOOD PRESSURE: 119 MMHG | OXYGEN SATURATION: 100 % | HEART RATE: 79 BPM | TEMPERATURE: 98.2 F | TEMPERATURE: 98.2 F | SYSTOLIC BLOOD PRESSURE: 112 MMHG | DIASTOLIC BLOOD PRESSURE: 67 MMHG

## 2022-04-07 VITALS
HEIGHT: 64 IN | DIASTOLIC BLOOD PRESSURE: 78 MMHG | BODY MASS INDEX: 41.15 KG/M2 | WEIGHT: 241 LBS | SYSTOLIC BLOOD PRESSURE: 134 MMHG | OXYGEN SATURATION: 97 %

## 2022-04-07 DIAGNOSIS — Z01.818 PRE-OP EXAM: Primary | ICD-10-CM

## 2022-04-07 PROCEDURE — G8417 CALC BMI ABV UP PARAM F/U: HCPCS | Performed by: STUDENT IN AN ORGANIZED HEALTH CARE EDUCATION/TRAINING PROGRAM

## 2022-04-07 PROCEDURE — G8510 SCR DEP NEG, NO PLAN REQD: HCPCS | Performed by: STUDENT IN AN ORGANIZED HEALTH CARE EDUCATION/TRAINING PROGRAM

## 2022-04-07 PROCEDURE — 99213 OFFICE O/P EST LOW 20 MIN: CPT | Performed by: STUDENT IN AN ORGANIZED HEALTH CARE EDUCATION/TRAINING PROGRAM

## 2022-04-07 PROCEDURE — G0300 HHS/HOSPICE OF LPN EA 15 MIN: HCPCS

## 2022-04-07 PROCEDURE — G8427 DOCREV CUR MEDS BY ELIG CLIN: HCPCS | Performed by: STUDENT IN AN ORGANIZED HEALTH CARE EDUCATION/TRAINING PROGRAM

## 2022-04-07 PROCEDURE — 93000 ELECTROCARDIOGRAM COMPLETE: CPT | Performed by: STUDENT IN AN ORGANIZED HEALTH CARE EDUCATION/TRAINING PROGRAM

## 2022-04-07 NOTE — PROGRESS NOTES
[facility] [address], [address] [facility], [address] [place] [address]    Office [phone],[facility] [phone]           Nicole Bhakta is a 42 y.o. female presents the office for preoperative evaluation      Assessment/Recommendations:      ICD-10-CM ICD-9-CM    1. Pre-op exam  Z01.818 V72.84 AMB POC EKG ROUTINE W/ 12 LEADS, INTER & REP       Patient will be low risk for adverse cardiovascular event with moderate sedation for dental work. However, patient's elevated Mallampati score along thick neck likely will leave her prone to obstructive sleep apnea. She is without symptoms of obstructive sleep apnea. Recommend practitioner who is familiar with airway management be available to proceed with moderate sedation. Recommend no further cardiovascular testing prior to dental work. Primary Care Physician- Kendy Gibbs MD     Follow-up as needed        Subjective:  42 y. o. with very complex medical history presents to the office for evaluation of preprocedural cardiovascular examination. Patient is scheduled to have multiple dental procedures in the near future. Due to her complex medical history, dentistry wanted to make sure her heart is healthy enough to proceed with conscious sedation. Patient is without any significant cardiovascular history. No chest pain or chest pressure symptoms. No exertional dyspnea. No orthopnea or PND.       Past Medical History:   Diagnosis Date    Adverse effect of anesthesia     WOKE DURING SURGERY    Arthritis     Blood clot in vein 2017    STOMACH    Crohn's disease ([address] 8/15/2011    Cyst of left kidney     DVT (deep venous thrombosis) ([address] 8/15/2011    LEFT LEG    Edema     GENERALIZED R/T TPN; WAIST DOWN    Incarcerated ventral hernia 8/15/2011    Lupus ([address]     Lyme disease     Neuropathy     Psychiatric disorder     ANXIETY    Right flank pain 8/22/2011    Seizures ([address] 1990    LAST AT AGE 15    Small bowel ischemia ([facility] 6/28/2017    CT abd/pelvis 6/28/17 1. The stomach and proximal small bowel loops are distended. There is a loop of small bowel in the midabdomen which is mildly dilated and does not demonstrate enhancement in the wall and there is suspicion of pneumatosis and this leads to a loop of small bowel which demonstrates thickening of the wall and findings are suspicious for ischemia. 2. 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 There is nonocclusive thrombus in the SMA.  Thyroid disease     HYPO-NO MEDS    Uncertain tumor of kidney and ureter, right         Past Surgical History:   Procedure Laterality Date    HX APPENDECTOMY      HX GYN  2015    HIDRADENTIS LABIA    HX OTHER SURGICAL      multiple procedures related to her Crohn's disease    HX OTHER SURGICAL      ABDOMINAL SURGERY REMOVING COLON, 2/3 STOMACH, 1/3 SMALL INTESTINE    IR INSERT TUNL CVC W/O PORT OVER 5 YR  11/25/2019    NE LAP, INCISIONAL HERNIA REPAIR,INCARCERATED  9-10-08    dr. Ron Ramsey         Current Outpatient Medications:     nystatin (Nyamyc) powder, apply to affected area (PERISTOMAL SKIN) twice a day to three times a day, Disp: 60 g, Rfl: 3    fluticasone propionate (FLONASE) 50 mcg/actuation nasal spray, 2 Sprays by Both Nostrils route daily. , Disp: 1 Each, Rfl: 5    heparin, porcine, pf 100 unit/mL injection, 300 Units by IntraVENous route daily. , Disp: , Rfl:     gabapentin (NEURONTIN) 800 mg tablet, Take 800 mg by mouth three (3) times daily. , Disp: , Rfl:     morphine IR (MS IR) 30 mg tablet, take 1 tablet by mouth every 8 hours if needed for severe pain, Disp: , Rfl:     magnesium gluconate 500 mg (27 mg elemental magnesium) tab tablet, Take 400 mg by mouth daily. , Disp: , Rfl:     bwkiforjd-Yiucsmbu-Ievsr-W.Pet (PREPARATION H MAXIMUM STRENGTH) 0.25-1 % rectal cream, Insert 1 g into rectum as needed for Hemorrhoids, Itching or Pain., Disp: , Rfl:     EPINEPHrine (EPIPEN) 0.3 mg/0.3 mL injection, inject 0.3 milliliters ( 0.3 milligrams ) intramuscularly ONCE if. ..  (REFER TO PRESCRIPTION NOTES). , Disp: , Rfl:     predniSONE (DELTASONE) 2.5 mg tablet, Take 2.5 mg by mouth Every morning., Disp: , Rfl:     albuterol (PROVENTIL HFA, VENTOLIN HFA, PROAIR HFA) 90 mcg/actuation inhaler, EVERY 4 HOURS AS NEEDED as needed for ALLERGIES, Disp: , Rfl:     escitalopram oxalate (LEXAPRO) 10 mg tablet, Take 5 mg by mouth daily. , Disp: , Rfl:     potassium chloride SA (MICRO-K) 10 mEq capsule, potassium chloride ER 10 mEq capsule,extended release  take 1 capsule by mouth every morning WHILE ON LASIX, Disp: , Rfl:     morphine CR (MS CONTIN) 60 mg CR tablet, Take 60 mg by mouth every eight (8) hours. , Disp: , Rfl:     Humira,CF, Pen 40 mg/0.4 mL injection pen, 40 mg by SubCUTAneous route every seven (7) days. per subsequtaneous injection Once a week , Disp: , Rfl:     naloxone (Narcan) 4 mg/actuation nasal spray, Narcan 4 mg/actuation nasal spray (spray 1 actuation via nasal for opiod overdose) , Disp: , Rfl:     predniSONE (DELTASONE) 5 mg tablet, Take 5 mg by mouth nightly., Disp: , Rfl:     cholecalciferol (VITAMIN D3) (1000 Units /25 mcg) tablet, Take 5,000 Units by mouth daily. , Disp: , Rfl:     furosemide (LASIX) 20 mg tablet, Take 20 mg by mouth daily. TAKE AS NEED FOR FLUID RETENTION OF 3 POUNDS OR MORE UNTIL RETURN TO BASELINE WEIGHT, Disp: , Rfl:     acetaminophen-caffeine 500-65 mg (EXCEDRINE TENSION HEADACHE) 500-65 mg tab, Take 1 Tab by mouth every eight (8) hours as needed for Headache., Disp: , Rfl:     azaTHIOprine (IMURAN) 50 mg tablet, Take 150 mg by mouth daily. , Disp: , Rfl:     aspirin (ASPIRIN) 325 mg tablet, Take 325 mg by mouth daily. , Disp: , Rfl:     TPN ADULT - CENTRAL, 2,000 mL by IntraVENous route (central line) two (2) times a week. over 12 hours. Taper infusion rate up 1 hr/down1 hr, Disp: , Rfl:     omeprazole (PRILOSEC) 20 mg capsule, Take 20 mg by mouth daily. , Disp: , Rfl:     sodium chloride (NORMAL SALINE FLUSH), 10-20 mL by IntraVENous Push route daily. before and after TPN and/or labs, Disp: , Rfl:     dronabinol (MARINOL) 5 mg capsule, Take 1 Cap by mouth two (2) times daily as needed (appetite). Max Daily Amount: 10 mg. (Patient taking differently: Take 1 Cap by mouth every six (6) hours as needed for Nausea.), Disp: 60 Cap, Rfl: 0    ferrous sulfate 325 mg (65 mg iron) tablet, Take 1 Tab by mouth Daily (before breakfast). (Patient taking differently: Take 1 Tablet by mouth every other day.), Disp: 30 Tab, Rfl: 2    promethazine (PHENERGAN) 25 mg tablet, Take 25 mg by mouth every six (6) hours as needed for Nausea., Disp: , Rfl:     Allergies   Allergen Reactions    Meperidine Rash and Unknown (comments)     Other reaction(s): SEVERE NAUSEA  Reaction Type: Allergy      Sulfa (Sulfonamide Antibiotics) Anaphylaxis    Carisoprodol Rash, Nausea Only and Other (comments)     Other reaction(s): RASH, ITCHING  Reaction Type: Allergy;  Reaction(s): hives and itching      Sumatriptan Nausea Only    Toradol [Ketorolac Tromethamine] Nausea and Vomiting        Family History   Problem Relation Age of Onset    Hypertension Father     Stroke Father     Other Father         arthritis    OSTEOARTHRITIS Father     Hypertension Mother     OSTEOARTHRITIS Mother     Heart Attack Mother     Anesth Problems Neg Hx        Social History     Tobacco Use    Smoking status: Former Smoker     Packs/day: 1.00     Years: 16.00     Pack years: 16.00     Quit date: 2017     Years since quittin.1    Smokeless tobacco: Former User    Tobacco comment: OFF AND ON   Vaping Use    Vaping Use: Never used   Substance Use Topics    Alcohol use: No    Drug use: No       Review of Symptoms:  Pertinent Positive:neg  Pertinent Negative:No chest pain, dyspnea on exertion, shortness of breath, orthopnea, PND    All Other systems reviewed and are negative for a Comprehensive ROS (10+)    Physical Exam    Blood pressure 134/78, height 5' 4\" (1.626 m), weight 241 lb (109.3 kg), SpO2 97 %. Constitutional: Chronic ill-appearing, presents in wheelchair. No distress. HENT: Normocephalic. Eyes: No scleral icterus. Neck:  Neck supple. No JVD present. Pulmonary/Chest: Effort normal and breath sounds normal. No respiratory distress, wheezes or rales. Cardiovascular: Normal rate, regular rhythm, S1 S2 . Exam reveals no gallop and no friction rub. No murmur heard. No edema. Extremities:  Normal muscle tone  Abdominal:   No abnormal distension. Neurological:  Moving all extremities, cranial nerves appear grossly intact. Skin: Skin is not cold. Not diaphoretic. No erythema. Psychiatric:  Grossly normal mood and affect. Intact insight. Objective Data: Investigations personally reviewed and interpreted    EC22- sinus tachycardia, nsst changes          Investigations reviewed             Yadira Yao DO          ATTENTION:   This medical record was transcribed using an electronic medical records/speech recognition system. Although proofread, it may and can contain electronic, spelling and other errors. Corrections may be executed at a later time. Please feel free to contact us for any clarifications as needed.

## 2022-04-07 NOTE — PROGRESS NOTES
Edgar Carmicahel is a 42 y.o. female    Chief Complaint   Patient presents with    New Patient     surgical clearance       Chest pain No    SOB No    Dizziness No    Swelling No    Refills No    Visit Vitals  Ht 5' 4\" (1.626 m)   BMI 41.88 kg/m²       1. Have you been to the ER, urgent care clinic since your last visit? Hospitalized since your last visit? No    2. Have you seen or consulted any other health care providers outside of the [address] [place] [address] since your last visit? Include any pap smears or colon screening.   No

## 2022-04-07 NOTE — PROGRESS NOTES
Betty Calle is a 42 y.o. female    Chief Complaint   Patient presents with    New Patient     surgical clearance     Patient dental procedure on 4/13/2022-extractions and grafting and implants    Chest pain No     SOB No    Dizziness No    Swelling swelling in her feet due to neuropathy       Refills No    Visit Vitals  /78 (BP 1 Location: Left upper arm, BP Patient Position: Sitting)   Ht 5' 4\" (1.626 m)   Wt 241 lb (109.3 kg)   SpO2 97%   BMI 41.37 kg/m²       1. Have you been to the ER, urgent care clinic since your last visit? Hospitalized since your last visit? No    2. Have you seen or consulted any other health care providers outside of the [address] [place] [address] since your last visit? Include any pap smears or colon screening.   No

## 2022-04-09 ENCOUNTER — HOME CARE VISIT (OUTPATIENT)
Dept: SCHEDULING | Facility: HOME HEALTH | Age: 47
End: 2022-04-09
Payer: MEDICARE

## 2022-04-09 VITALS
HEART RATE: 77 BPM | RESPIRATION RATE: 16 BRPM | DIASTOLIC BLOOD PRESSURE: 60 MMHG | SYSTOLIC BLOOD PRESSURE: 102 MMHG | OXYGEN SATURATION: 99 % | TEMPERATURE: 98.3 F

## 2022-04-09 PROCEDURE — G0299 HHS/HOSPICE OF RN EA 15 MIN: HCPCS

## 2022-04-12 ENCOUNTER — HOME CARE VISIT (OUTPATIENT)
Dept: SCHEDULING | Facility: HOME HEALTH | Age: 47
End: 2022-04-12
Payer: MEDICARE

## 2022-04-12 PROCEDURE — 400014 HH F/U

## 2022-04-12 PROCEDURE — G0151 HHCP-SERV OF PT,EA 15 MIN: HCPCS

## 2022-04-13 ENCOUNTER — HOME CARE VISIT (OUTPATIENT)
Dept: HOME HEALTH SERVICES | Facility: HOME HEALTH | Age: 47
End: 2022-04-13
Payer: MEDICARE

## 2022-04-13 ENCOUNTER — HOME CARE VISIT (OUTPATIENT)
Dept: SCHEDULING | Facility: HOME HEALTH | Age: 47
End: 2022-04-13
Payer: MEDICARE

## 2022-04-13 VITALS
OXYGEN SATURATION: 99 % | DIASTOLIC BLOOD PRESSURE: 78 MMHG | RESPIRATION RATE: 18 BRPM | SYSTOLIC BLOOD PRESSURE: 119 MMHG | HEART RATE: 85 BPM

## 2022-04-13 PROCEDURE — G0300 HHS/HOSPICE OF LPN EA 15 MIN: HCPCS

## 2022-04-15 ENCOUNTER — HOME CARE VISIT (OUTPATIENT)
Dept: SCHEDULING | Facility: HOME HEALTH | Age: 47
End: 2022-04-15
Payer: MEDICARE

## 2022-04-15 PROCEDURE — G0300 HHS/HOSPICE OF LPN EA 15 MIN: HCPCS

## 2022-04-17 VITALS
DIASTOLIC BLOOD PRESSURE: 70 MMHG | RESPIRATION RATE: 18 BRPM | HEART RATE: 82 BPM | SYSTOLIC BLOOD PRESSURE: 108 MMHG | RESPIRATION RATE: 18 BRPM | TEMPERATURE: 98 F | DIASTOLIC BLOOD PRESSURE: 80 MMHG | OXYGEN SATURATION: 99 % | HEART RATE: 87 BPM | OXYGEN SATURATION: 99 % | TEMPERATURE: 98.3 F | SYSTOLIC BLOOD PRESSURE: 121 MMHG

## 2022-04-19 ENCOUNTER — HOME CARE VISIT (OUTPATIENT)
Dept: SCHEDULING | Facility: HOME HEALTH | Age: 47
End: 2022-04-19
Payer: MEDICARE

## 2022-04-19 PROCEDURE — G0300 HHS/HOSPICE OF LPN EA 15 MIN: HCPCS

## 2022-04-20 VITALS
OXYGEN SATURATION: 100 % | TEMPERATURE: 97.9 F | HEART RATE: 77 BPM | RESPIRATION RATE: 18 BRPM | DIASTOLIC BLOOD PRESSURE: 60 MMHG | SYSTOLIC BLOOD PRESSURE: 120 MMHG

## 2022-04-21 ENCOUNTER — HOME CARE VISIT (OUTPATIENT)
Dept: SCHEDULING | Facility: HOME HEALTH | Age: 47
End: 2022-04-21
Payer: MEDICARE

## 2022-04-21 VITALS
SYSTOLIC BLOOD PRESSURE: 109 MMHG | OXYGEN SATURATION: 99 % | TEMPERATURE: 97.8 F | RESPIRATION RATE: 18 BRPM | HEART RATE: 74 BPM | DIASTOLIC BLOOD PRESSURE: 71 MMHG

## 2022-04-21 PROCEDURE — A6216 NON-STERILE GAUZE<=16 SQ IN: HCPCS

## 2022-04-21 PROCEDURE — A4357 BEDSIDE DRAINAGE BAG: HCPCS

## 2022-04-21 PROCEDURE — G0300 HHS/HOSPICE OF LPN EA 15 MIN: HCPCS

## 2022-04-21 PROCEDURE — A6250 SKIN SEAL PROTECT MOISTURIZR: HCPCS

## 2022-04-25 ENCOUNTER — HOME CARE VISIT (OUTPATIENT)
Dept: SCHEDULING | Facility: HOME HEALTH | Age: 47
End: 2022-04-25
Payer: MEDICARE

## 2022-04-25 PROCEDURE — G0300 HHS/HOSPICE OF LPN EA 15 MIN: HCPCS

## 2022-04-26 ENCOUNTER — TELEPHONE (OUTPATIENT)
Dept: FAMILY MEDICINE CLINIC | Age: 47
End: 2022-04-26

## 2022-04-26 ENCOUNTER — HOME CARE VISIT (OUTPATIENT)
Dept: SCHEDULING | Facility: HOME HEALTH | Age: 47
End: 2022-04-26
Payer: MEDICARE

## 2022-04-26 VITALS
OXYGEN SATURATION: 99 % | SYSTOLIC BLOOD PRESSURE: 104 MMHG | HEART RATE: 67 BPM | DIASTOLIC BLOOD PRESSURE: 66 MMHG | TEMPERATURE: 97.4 F | RESPIRATION RATE: 18 BRPM

## 2022-04-26 PROCEDURE — G0151 HHCP-SERV OF PT,EA 15 MIN: HCPCS

## 2022-04-26 NOTE — TELEPHONE ENCOUNTER
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

## 2022-04-27 ENCOUNTER — HOME CARE VISIT (OUTPATIENT)
Dept: SCHEDULING | Facility: HOME HEALTH | Age: 47
End: 2022-04-27
Payer: MEDICARE

## 2022-04-27 PROCEDURE — G0300 HHS/HOSPICE OF LPN EA 15 MIN: HCPCS

## 2022-04-27 NOTE — TELEPHONE ENCOUNTER
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 She needs to see the colorectal surgeon James Hernandez MD.  I suggest she call to make an appointment as soon as possible. Please call patient.

## 2022-04-28 ENCOUNTER — TELEPHONE (OUTPATIENT)
Dept: FAMILY MEDICINE CLINIC | Age: 47
End: 2022-04-28

## 2022-04-28 ENCOUNTER — HOME CARE VISIT (OUTPATIENT)
Dept: SCHEDULING | Facility: HOME HEALTH | Age: 47
End: 2022-04-28
Payer: MEDICARE

## 2022-05-01 VITALS
OXYGEN SATURATION: 99 % | TEMPERATURE: 98 F | RESPIRATION RATE: 18 BRPM | SYSTOLIC BLOOD PRESSURE: 112 MMHG | TEMPERATURE: 98.4 F | DIASTOLIC BLOOD PRESSURE: 60 MMHG | HEART RATE: 77 BPM | DIASTOLIC BLOOD PRESSURE: 66 MMHG | HEART RATE: 78 BPM | SYSTOLIC BLOOD PRESSURE: 106 MMHG | RESPIRATION RATE: 18 BRPM | OXYGEN SATURATION: 100 %

## 2022-05-02 ENCOUNTER — HOME CARE VISIT (OUTPATIENT)
Dept: SCHEDULING | Facility: HOME HEALTH | Age: 47
End: 2022-05-02
Payer: MEDICARE

## 2022-05-03 ENCOUNTER — HOME CARE VISIT (OUTPATIENT)
Dept: SCHEDULING | Facility: HOME HEALTH | Age: 47
End: 2022-05-03
Payer: MEDICARE

## 2022-05-03 PROCEDURE — G0300 HHS/HOSPICE OF LPN EA 15 MIN: HCPCS

## 2022-05-04 ENCOUNTER — HOME CARE VISIT (OUTPATIENT)
Dept: SCHEDULING | Facility: HOME HEALTH | Age: 47
End: 2022-05-04
Payer: MEDICARE

## 2022-05-04 VITALS
OXYGEN SATURATION: 99 % | TEMPERATURE: 96.8 F | HEART RATE: 75 BPM | DIASTOLIC BLOOD PRESSURE: 69 MMHG | RESPIRATION RATE: 18 BRPM | SYSTOLIC BLOOD PRESSURE: 103 MMHG

## 2022-05-04 PROCEDURE — G0151 HHCP-SERV OF PT,EA 15 MIN: HCPCS

## 2022-05-05 ENCOUNTER — HOME CARE VISIT (OUTPATIENT)
Dept: SCHEDULING | Facility: HOME HEALTH | Age: 47
End: 2022-05-05
Payer: MEDICARE

## 2022-05-05 PROCEDURE — A4409 OST SKN BARR CONVEX <=4 SQ I: HCPCS

## 2022-05-05 PROCEDURE — A4385 OST SKN BARRIER SLD EXT WEAR: HCPCS

## 2022-05-05 PROCEDURE — A6216 NON-STERILE GAUZE<=16 SQ IN: HCPCS

## 2022-05-05 PROCEDURE — G0300 HHS/HOSPICE OF LPN EA 15 MIN: HCPCS

## 2022-05-05 PROCEDURE — A6250 SKIN SEAL PROTECT MOISTURIZR: HCPCS

## 2022-05-06 ENCOUNTER — HOME CARE VISIT (OUTPATIENT)
Dept: SCHEDULING | Facility: HOME HEALTH | Age: 47
End: 2022-05-06
Payer: MEDICARE

## 2022-05-07 VITALS
OXYGEN SATURATION: 100 % | SYSTOLIC BLOOD PRESSURE: 108 MMHG | HEART RATE: 77 BPM | BODY MASS INDEX: 41.88 KG/M2 | TEMPERATURE: 98 F | BODY MASS INDEX: 41.88 KG/M2 | WEIGHT: 244 LBS | RESPIRATION RATE: 18 BRPM | HEART RATE: 89 BPM | DIASTOLIC BLOOD PRESSURE: 88 MMHG | RESPIRATION RATE: 18 BRPM | SYSTOLIC BLOOD PRESSURE: 133 MMHG | OXYGEN SATURATION: 99 % | TEMPERATURE: 97.7 F | WEIGHT: 244 LBS | DIASTOLIC BLOOD PRESSURE: 66 MMHG

## 2022-05-10 ENCOUNTER — HOME CARE VISIT (OUTPATIENT)
Dept: SCHEDULING | Facility: HOME HEALTH | Age: 47
End: 2022-05-10
Payer: MEDICARE

## 2022-05-10 VITALS
RESPIRATION RATE: 18 BRPM | SYSTOLIC BLOOD PRESSURE: 118 MMHG | OXYGEN SATURATION: 99 % | DIASTOLIC BLOOD PRESSURE: 78 MMHG | HEART RATE: 71 BPM | TEMPERATURE: 96.6 F

## 2022-05-10 PROCEDURE — G0151 HHCP-SERV OF PT,EA 15 MIN: HCPCS

## 2022-05-11 ENCOUNTER — HOME CARE VISIT (OUTPATIENT)
Dept: SCHEDULING | Facility: HOME HEALTH | Age: 47
End: 2022-05-11
Payer: MEDICARE

## 2022-05-11 PROCEDURE — 400014 HH F/U

## 2022-05-11 PROCEDURE — G0300 HHS/HOSPICE OF LPN EA 15 MIN: HCPCS

## 2022-05-13 ENCOUNTER — HOME CARE VISIT (OUTPATIENT)
Dept: SCHEDULING | Facility: HOME HEALTH | Age: 47
End: 2022-05-13
Payer: MEDICARE

## 2022-05-13 PROCEDURE — G0300 HHS/HOSPICE OF LPN EA 15 MIN: HCPCS

## 2022-05-14 VITALS
SYSTOLIC BLOOD PRESSURE: 101 MMHG | RESPIRATION RATE: 18 BRPM | WEIGHT: 243 LBS | WEIGHT: 244 LBS | DIASTOLIC BLOOD PRESSURE: 60 MMHG | TEMPERATURE: 98 F | HEART RATE: 77 BPM | TEMPERATURE: 97.7 F | BODY MASS INDEX: 41.71 KG/M2 | SYSTOLIC BLOOD PRESSURE: 108 MMHG | RESPIRATION RATE: 18 BRPM | DIASTOLIC BLOOD PRESSURE: 66 MMHG | HEART RATE: 74 BPM | OXYGEN SATURATION: 100 % | OXYGEN SATURATION: 99 % | BODY MASS INDEX: 41.88 KG/M2

## 2022-05-17 ENCOUNTER — HOME CARE VISIT (OUTPATIENT)
Dept: SCHEDULING | Facility: HOME HEALTH | Age: 47
End: 2022-05-17
Payer: MEDICARE

## 2022-05-17 VITALS
TEMPERATURE: 98 F | HEART RATE: 72 BPM | SYSTOLIC BLOOD PRESSURE: 110 MMHG | RESPIRATION RATE: 18 BRPM | DIASTOLIC BLOOD PRESSURE: 60 MMHG

## 2022-05-17 PROCEDURE — G0299 HHS/HOSPICE OF RN EA 15 MIN: HCPCS

## 2022-05-19 ENCOUNTER — HOME CARE VISIT (OUTPATIENT)
Dept: SCHEDULING | Facility: HOME HEALTH | Age: 47
End: 2022-05-19
Payer: MEDICARE

## 2022-05-19 VITALS
RESPIRATION RATE: 18 BRPM | DIASTOLIC BLOOD PRESSURE: 54 MMHG | HEART RATE: 68 BPM | TEMPERATURE: 97.7 F | OXYGEN SATURATION: 99 % | SYSTOLIC BLOOD PRESSURE: 76 MMHG

## 2022-05-19 PROCEDURE — G0299 HHS/HOSPICE OF RN EA 15 MIN: HCPCS

## 2022-05-23 PROCEDURE — A6216 NON-STERILE GAUZE<=16 SQ IN: HCPCS

## 2022-05-23 PROCEDURE — A6250 SKIN SEAL PROTECT MOISTURIZR: HCPCS

## 2022-05-25 ENCOUNTER — HOME CARE VISIT (OUTPATIENT)
Dept: SCHEDULING | Facility: HOME HEALTH | Age: 47
End: 2022-05-25
Payer: MEDICARE

## 2022-05-25 PROCEDURE — G0300 HHS/HOSPICE OF LPN EA 15 MIN: HCPCS

## 2022-05-27 ENCOUNTER — HOME CARE VISIT (OUTPATIENT)
Dept: SCHEDULING | Facility: HOME HEALTH | Age: 47
End: 2022-05-27
Payer: MEDICARE

## 2022-05-27 VITALS
HEART RATE: 80 BPM | DIASTOLIC BLOOD PRESSURE: 65 MMHG | DIASTOLIC BLOOD PRESSURE: 56 MMHG | RESPIRATION RATE: 18 BRPM | TEMPERATURE: 98 F | TEMPERATURE: 97.9 F | WEIGHT: 244 LBS | WEIGHT: 244 LBS | HEART RATE: 68 BPM | SYSTOLIC BLOOD PRESSURE: 117 MMHG | OXYGEN SATURATION: 99 % | OXYGEN SATURATION: 100 % | BODY MASS INDEX: 41.88 KG/M2 | SYSTOLIC BLOOD PRESSURE: 123 MMHG | RESPIRATION RATE: 18 BRPM | BODY MASS INDEX: 41.88 KG/M2

## 2022-05-27 PROCEDURE — G0300 HHS/HOSPICE OF LPN EA 15 MIN: HCPCS

## 2022-05-29 ENCOUNTER — HOME CARE VISIT (OUTPATIENT)
Dept: SCHEDULING | Facility: HOME HEALTH | Age: 47
End: 2022-05-29
Payer: MEDICARE

## 2022-05-29 PROCEDURE — G0300 HHS/HOSPICE OF LPN EA 15 MIN: HCPCS

## 2022-06-02 VITALS
SYSTOLIC BLOOD PRESSURE: 109 MMHG | WEIGHT: 242 LBS | OXYGEN SATURATION: 99 % | DIASTOLIC BLOOD PRESSURE: 54 MMHG | TEMPERATURE: 98 F | HEART RATE: 76 BPM | BODY MASS INDEX: 41.54 KG/M2 | RESPIRATION RATE: 18 BRPM

## 2022-06-03 ENCOUNTER — HOME CARE VISIT (OUTPATIENT)
Dept: SCHEDULING | Facility: HOME HEALTH | Age: 47
End: 2022-06-03
Payer: MEDICARE

## 2022-06-03 PROCEDURE — G0300 HHS/HOSPICE OF LPN EA 15 MIN: HCPCS

## 2022-06-07 ENCOUNTER — HOME CARE VISIT (OUTPATIENT)
Dept: SCHEDULING | Facility: HOME HEALTH | Age: 47
End: 2022-06-07
Payer: MEDICARE

## 2022-06-07 PROCEDURE — A4385 OST SKN BARRIER SLD EXT WEAR: HCPCS

## 2022-06-07 PROCEDURE — A6216 NON-STERILE GAUZE<=16 SQ IN: HCPCS

## 2022-06-07 PROCEDURE — MED10156 SCISSORS,BANDAGE,LISTER,5.5

## 2022-06-07 PROCEDURE — G0300 HHS/HOSPICE OF LPN EA 15 MIN: HCPCS

## 2022-06-07 PROCEDURE — A6250 SKIN SEAL PROTECT MOISTURIZR: HCPCS

## 2022-06-09 ENCOUNTER — HOME CARE VISIT (OUTPATIENT)
Dept: SCHEDULING | Facility: HOME HEALTH | Age: 47
End: 2022-06-09
Payer: MEDICARE

## 2022-06-09 PROCEDURE — G0299 HHS/HOSPICE OF RN EA 15 MIN: HCPCS

## 2022-06-12 VITALS
OXYGEN SATURATION: 97 % | RESPIRATION RATE: 18 BRPM | DIASTOLIC BLOOD PRESSURE: 66 MMHG | TEMPERATURE: 97 F | BODY MASS INDEX: 42.05 KG/M2 | WEIGHT: 245 LBS | HEART RATE: 78 BPM | SYSTOLIC BLOOD PRESSURE: 118 MMHG

## 2022-06-12 VITALS
DIASTOLIC BLOOD PRESSURE: 74 MMHG | SYSTOLIC BLOOD PRESSURE: 116 MMHG | WEIGHT: 242 LBS | TEMPERATURE: 98 F | RESPIRATION RATE: 18 BRPM | OXYGEN SATURATION: 99 % | HEART RATE: 80 BPM | BODY MASS INDEX: 41.54 KG/M2

## 2022-06-12 VITALS
RESPIRATION RATE: 18 BRPM | TEMPERATURE: 96.9 F | DIASTOLIC BLOOD PRESSURE: 66 MMHG | SYSTOLIC BLOOD PRESSURE: 112 MMHG | HEART RATE: 77 BPM | WEIGHT: 242 LBS | OXYGEN SATURATION: 100 % | BODY MASS INDEX: 41.54 KG/M2

## 2022-06-15 ENCOUNTER — HOME CARE VISIT (OUTPATIENT)
Dept: SCHEDULING | Facility: HOME HEALTH | Age: 47
End: 2022-06-15
Payer: MEDICARE

## 2022-06-15 PROCEDURE — 400014 HH F/U

## 2022-06-15 PROCEDURE — G0300 HHS/HOSPICE OF LPN EA 15 MIN: HCPCS

## 2022-06-17 ENCOUNTER — HOME CARE VISIT (OUTPATIENT)
Dept: SCHEDULING | Facility: HOME HEALTH | Age: 47
End: 2022-06-17
Payer: MEDICARE

## 2022-06-17 PROCEDURE — G0300 HHS/HOSPICE OF LPN EA 15 MIN: HCPCS

## 2022-06-21 ENCOUNTER — HOME CARE VISIT (OUTPATIENT)
Dept: HOME HEALTH SERVICES | Facility: HOME HEALTH | Age: 47
End: 2022-06-21
Payer: MEDICARE

## 2022-06-22 VITALS
BODY MASS INDEX: 41.54 KG/M2 | WEIGHT: 242 LBS | OXYGEN SATURATION: 100 % | HEART RATE: 77 BPM | WEIGHT: 242 LBS | RESPIRATION RATE: 18 BRPM | OXYGEN SATURATION: 100 % | SYSTOLIC BLOOD PRESSURE: 112 MMHG | HEART RATE: 77 BPM | TEMPERATURE: 97.8 F | BODY MASS INDEX: 41.54 KG/M2 | DIASTOLIC BLOOD PRESSURE: 77 MMHG | DIASTOLIC BLOOD PRESSURE: 60 MMHG | SYSTOLIC BLOOD PRESSURE: 109 MMHG | RESPIRATION RATE: 18 BRPM | TEMPERATURE: 98 F

## 2022-06-23 ENCOUNTER — HOME CARE VISIT (OUTPATIENT)
Dept: SCHEDULING | Facility: HOME HEALTH | Age: 47
End: 2022-06-23
Payer: MEDICARE

## 2022-06-23 PROCEDURE — G0300 HHS/HOSPICE OF LPN EA 15 MIN: HCPCS

## 2022-06-26 PROCEDURE — A4419 OST PCH FOR BAR W FLANGE/FLT: HCPCS

## 2022-06-27 ENCOUNTER — HOME CARE VISIT (OUTPATIENT)
Dept: SCHEDULING | Facility: HOME HEALTH | Age: 47
End: 2022-06-27
Payer: MEDICARE

## 2022-06-27 PROCEDURE — G0300 HHS/HOSPICE OF LPN EA 15 MIN: HCPCS

## 2022-06-29 ENCOUNTER — HOME CARE VISIT (OUTPATIENT)
Dept: HOME HEALTH SERVICES | Facility: HOME HEALTH | Age: 47
End: 2022-06-29
Payer: MEDICARE

## 2022-06-29 VITALS
DIASTOLIC BLOOD PRESSURE: 81 MMHG | RESPIRATION RATE: 18 BRPM | TEMPERATURE: 97.6 F | SYSTOLIC BLOOD PRESSURE: 121 MMHG | OXYGEN SATURATION: 99 % | HEART RATE: 117 BPM

## 2022-07-01 LAB — HBA1C MFR BLD HPLC: 6.7 %

## 2022-07-04 VITALS
BODY MASS INDEX: 41.54 KG/M2 | SYSTOLIC BLOOD PRESSURE: 128 MMHG | OXYGEN SATURATION: 100 % | RESPIRATION RATE: 18 BRPM | DIASTOLIC BLOOD PRESSURE: 60 MMHG | WEIGHT: 242 LBS | TEMPERATURE: 98 F | HEART RATE: 86 BPM

## 2022-08-05 ENCOUNTER — VIRTUAL VISIT (OUTPATIENT)
Dept: FAMILY MEDICINE CLINIC | Age: 47
End: 2022-08-05
Payer: MEDICARE

## 2022-08-05 DIAGNOSIS — K63.2 SMALL BOWEL FISTULA: ICD-10-CM

## 2022-08-05 DIAGNOSIS — K50.018 CROHN'S DISEASE OF SMALL INTESTINE WITH OTHER COMPLICATION (HCC): ICD-10-CM

## 2022-08-05 DIAGNOSIS — A09 INTESTINAL INFECTION: ICD-10-CM

## 2022-08-05 DIAGNOSIS — K63.2 ENTEROCUTANEOUS FISTULA: Primary | ICD-10-CM

## 2022-08-05 PROCEDURE — G8427 DOCREV CUR MEDS BY ELIG CLIN: HCPCS | Performed by: STUDENT IN AN ORGANIZED HEALTH CARE EDUCATION/TRAINING PROGRAM

## 2022-08-05 PROCEDURE — 99213 OFFICE O/P EST LOW 20 MIN: CPT | Performed by: STUDENT IN AN ORGANIZED HEALTH CARE EDUCATION/TRAINING PROGRAM

## 2022-08-05 PROCEDURE — G8432 DEP SCR NOT DOC, RNG: HCPCS | Performed by: STUDENT IN AN ORGANIZED HEALTH CARE EDUCATION/TRAINING PROGRAM

## 2022-08-05 PROCEDURE — G8417 CALC BMI ABV UP PARAM F/U: HCPCS | Performed by: STUDENT IN AN ORGANIZED HEALTH CARE EDUCATION/TRAINING PROGRAM

## 2022-08-05 RX ORDER — ENOXAPARIN SODIUM 100 MG/ML
110 INJECTION SUBCUTANEOUS EVERY 12 HOURS
COMMUNITY
Start: 2022-07-29 | End: 2022-09-16 | Stop reason: SDUPTHER

## 2022-08-05 RX ORDER — SODIUM CHLORIDE, SODIUM LACTATE, POTASSIUM CHLORIDE, CALCIUM CHLORIDE 600; 310; 30; 20 MG/100ML; MG/100ML; MG/100ML; MG/100ML
INJECTION, SOLUTION INTRAVENOUS
COMMUNITY
Start: 2022-07-22

## 2022-08-05 RX ORDER — LOPERAMIDE HYDROCHLORIDE 2 MG/1
6 CAPSULE ORAL
COMMUNITY
Start: 2022-07-29 | End: 2022-09-16 | Stop reason: SDUPTHER

## 2022-08-05 RX ORDER — PANTOPRAZOLE SODIUM 40 MG/10ML
INJECTION, POWDER, LYOPHILIZED, FOR SOLUTION INTRAVENOUS
COMMUNITY
Start: 2022-07-22

## 2022-08-05 NOTE — PROGRESS NOTES
Chief Complaint   Patient presents with    Follow Up Chronic Condition     1. \"Have you been to the ER, urgent care clinic since your last visit? Hospitalized since your last visit? \" Yes Right now is in [place] re[facility]. 2. \"Have you seen or consulted any other health care providers outside of the [address] [place] [address] since your last visit? \" Yes Pt stated that she has not seen the facility provider but has seen his NP      3. For patients aged 39-70: Has the patient had a colonoscopy / FIT/ Cologuard? NA - based on age      If the patient is female:    4. For patients aged 41-77: Has the patient had a mammogram within the past 2 years? Yes - no Care Gap present      5. For patients aged 21-65: Has the patient had a pap smear?  NA - based on age or sex      Esperanza Bruce [phone]

## 2022-08-26 ENCOUNTER — TELEPHONE (OUTPATIENT)
Dept: FAMILY MEDICINE CLINIC | Age: 47
End: 2022-08-26

## 2022-08-26 RX ORDER — FUROSEMIDE 20 MG/1
TABLET ORAL
Qty: 60 TABLET | Refills: 0 | Status: SHIPPED | OUTPATIENT
Start: 2022-08-26

## 2022-08-26 NOTE — TELEPHONE ENCOUNTER
Reason for call:  Pt sounds like she is in a lot of pain over the phone--she is barely able to talk due to the pain, she has severe/extreme swelling on her legs, hips, feet and ankles. She got off of saline, and she is still experiencing swelling. She is wondering if Lasix (Furosemide) would help with this swelling. She is wanting me to send a message back to see if there is anything we can do for this. She was told by the hospital to contact her doctor.     Is this a new problem: yes     Date of last appointment:  8/5/2022     Can we respond via [employer]amm[employer]: no    Best call back number: [phone]

## 2022-08-28 NOTE — PROGRESS NOTES
.Consent: Aaron Perez, who was seen by synchronous (real-time) audio-video technology, and/or her healthcare decision maker, is aware that this patient-initiated, Telehealth encounter on 8/5/2022 is a billable service, with coverage as determined by her insurance carrier. She is aware that she may receive a bill and has provided verbal consent to proceed: YES-Consent obtained within past 12 months        [id number]  Subjective: Aaron Perez is a 42 y.o. female who was seen for Follow Up Chronic Condition  Is here for follow-up of chronic conditions. About 2 months ago she had exploratory laparotomysmall bowel fistula takedown, extensive lysis of adhesions, small bowel resection, stoma revision, end ileostomy, and complex abdominal wall closure (Plastics) on 6/29/22. Post operative course complicated by ileus (now resolved), subcutaneous and intraabdominal fluid collections (s/p drains with serosanguinous fluid) growing candida (IV micafungin now on Flucanozole), pantoea and e. faecalis bacteremia (on zosyn), and a DVT (on heparin gtt). Patient was eventually discharged from the hospital, and underwent physical therapy. She continues in facility. Copy of DC note:  Hospital Course: The patient was admitted to [facility] on 6/29/2022 for the above mentioned procedures. The operation was without complication and tolerated well by the patient. Please see the operative note dictated by Best Babb MD for complete details of the case. Postoperatively the patient was brought to the PACU in stable condition. Postoperative lactace was elevatd to 7.0 likely due to inadequate resuscitation during lengthy operation. The patient was then transferred to the acute care floor for further management. Mechanical and pharmacologic DVT prophylaxis was provided throughout the hospital course. Antibiotic surgical prophylaxis was provided perioperatively.  Patient was managed via the Enhanced Recovery After Surgery (ERAS) protocol, including minimal postoperative fluids, adjunctive non-narcotic pain medications, early removal of woodruff catheter, and early mobilization. The patient worked with the wound/ostomy team, physical therapy and occupational therapy throughout the hospital stay. GI nutrition was consulted for chronic malnutrition and need for parental nutrition. Wound care team was consulted for new ostomy and incisional wound vac. Patient received a spinal preoperatively and was placed on 24 hours of capnography postoperatively. Patient's lactate cleared with IVF resuscitation. On 6/30/22, patient had frequent capnography alerts with respiratory rate down to 5-6 bpm. Sedative pain medications were reduced with subsequent improvement in respiratory status, and capnography monitoring was continued. Acute pain service was consulted for patient's high narcotic regime at home and difficult postoperative pain control. On 7/2/22, patient became tachycardic and tachypneic while ambulating around unit, which improved with rest. On 7/3/22, endocrinology was consulted for perioperative managmeent of her diabetes regimen. Patient had emesis and found to have an ileus. NGT was placed for decompression, and patient placed NPO. Patient continued to endorse uncontrolled LLQ and incisional pain. On 7/7/22, patient hemoglobin was 6.7 after slowly trending down postoperative, and patient was transfused one unit of blood. On 7/9/22, patient became febrile without hemodynamic instability. Blood cultures were drawn. Vancomycin and Zosyn were initiated. CT scan was obtained which demostrated anterior intraabdominal and parastomal fluid collections. Radiology procedures placed a US-guided drain with cultures. Blood cultures resulted gram negative rods, eventually speciated Pantoea.  On 7/11/22, patient became tachycardic to the 140s with Tmax 38.9 and was transferred to the SICU in the setting of sepsis with unidentified source of infection and need for escalation of care. Concerned for pulmonsary embolis, CT pulmonary angiography did not showed pulmonary embolism. Infectious Disease was consulted and recommended stoppin vancomycin and starting micafungin, after abdominal abscess cultures grew candida albicans and enterococcus faecalis, as well as a line holiday. TPN was held for positive tunneled-line central venous catheter cultures. Patient had high ostomy output and was treated with immodium and high-dose PPI with repletion of fluid losses and electrolytes. LE doppler US showed nonocclusive thrombus at left common femoral vein and great saphenous vein junction. Patient began therapeutic anticoagulation with heparin. On 7/13/22, parental nutrition was restarted. Patient continued to improve clinically with intensive care. On 7/15/22, patient was deemed stable for transfer from SICU to the acute care floor. Patient remained stable upon return to the acute care floor. Lomotil was added for continue high ostomy output. On 7/18/22, PICC line was placed due to need for parental nutrition and longterm intravenous antibiotics. Hematology was consulted for outpatient anticoagulation plan with follow [facility]. On 7/20/22, anticoagulation was converted to therapeutic lovenox. Due to continued refractory high ostomy output and history of small bowel resections with estimated only 140cm of viable small bowel remaining, gastrointenstinal service was consulted for workup for small bowel syndrome and recommendations for management as well as concern for adrenal insufficiency contributing. Patient will be started on liraglutide for short bowel syndromeon discharge and will follow up with Dr. Fidelina Noble outpatient.  Patient had abnormal cosyntryptin test. Endocrinology was consulted and recommended starting Hydrocortisone for long-term steroid use leading to possible adrenal insufficiency They will follow up with her outpatient for this.    On POD30, the patient was alert and oriented, ambulating with little assist, voiding independently, and tolerating a diet. The patient's pain was well controlled on oral pain medications and vital signs were stable. The patient was deemed appropriate for discharge to SNF. The patient will follow up in [facility] with Dr. Fatuma Espinal MD and Nutritionist Vera Nieto as well as with their primary care physician. Patient will follow up with Dr. Sebastien Carmona of plastic surgery outpatient. She will continue with TPN outpatient with biweekly labs to monitor her electrolyte status. Current Outpatient Medications on File Prior to Visit   Medication Sig Dispense Refill    enoxaparin (LOVENOX) 100 mg/mL 110 mg by SubCUTAneous route every twelve (12) hours. loperamide (IMODIUM) 2 mg capsule Take 6 mg by mouth.      morphine IR (MS IR) 30 mg tablet take 1 tablet by mouth every 8 hours if needed for severe pain      EPINEPHrine (EPIPEN) 0.3 mg/0.3 mL injection inject 0.3 milliliters ( 0.3 milligrams ) intramuscularly ONCE if. ..  (REFER TO PRESCRIPTION NOTES). morphine CR (MS CONTIN) 60 mg CR tablet Take 60 mg by mouth every eight (8) hours. acetaminophen-caffeine 500-65 mg (EXCEDRINE TENSION HEADACHE) 500-65 mg tab Take 1 Tab by mouth every eight (8) hours as needed for Headache.      azaTHIOprine (IMURAN) 50 mg tablet Take 150 mg by mouth daily. pantoprazole (PROTONIX) 40 mg injection       Ringer's solution,lactated (lactated Ringers) infusion       albuterol (PROVENTIL HFA, VENTOLIN HFA, PROAIR HFA) 90 mcg/actuation inhaler Take 2 Puffs by inhalation every four (4) hours as needed for Wheezing. (Patient not taking: Reported on 8/5/2022)      xqdvdakly-Arwsuhpj-Cmzul-W.Pet (PREPARATION H MAXIMUM STRENGTH) 0.25-1 % rectal cream Insert 1 g into rectum daily as needed for Itching.      magnesium gluconate 500 mg (27 mg elemental magnesium) tab tablet Take 1 Tablet by mouth daily. nystatin (Nyamyc) powder apply to affected area (PERISTOMAL SKIN) twice a day to three times a day (Patient taking differently: Apply 1 Film to affected area two (2) times a day. apply to affected area (PERISTOMAL SKIN) twice a day to three times a day) 60 g 3    fluticasone propionate (FLONASE) 50 mcg/actuation nasal spray 2 Sprays by Both Nostrils route daily. 1 Each 5    heparin, porcine, pf 100 unit/mL injection 300 Units by IntraVENous route daily. gabapentin (NEURONTIN) 800 mg tablet Take 800 mg by mouth three (3) times daily. predniSONE (DELTASONE) 2.5 mg tablet Take 2.5 mg by mouth Every morning. (Patient not taking: Reported on 8/5/2022)      escitalopram oxalate (LEXAPRO) 10 mg tablet Take 5 mg by mouth daily. potassium chloride SA (MICRO-K) 10 mEq capsule potassium chloride ER 10 mEq capsule,extended release   take 1 capsule by mouth every morning WHILE ON LASIX      Polyira,CF, Pen 40 mg/0.4 mL injection pen 40 mg by SubCUTAneous route every seven (7) days. per subsequtaneous injection Once a week  (Patient not taking: Reported on 8/5/2022)      naloxone (Narcan) 4 mg/actuation nasal spray Narcan 4 mg/actuation nasal spray (spray 1 actuation via nasal for opiod overdose)       predniSONE (DELTASONE) 5 mg tablet Take 5 mg by mouth nightly. (Patient not taking: Reported on 8/5/2022)      cholecalciferol (VITAMIN D3) (1000 Units /25 mcg) tablet Take 5,000 Units by mouth daily. (Patient not taking: Reported on 8/5/2022)      aspirin (ASPIRIN) 325 mg tablet Take 325 mg by mouth daily. (Patient not taking: Reported on 8/5/2022)      TPN ADULT - CENTRAL 2,000 mL by IntraVENous route (central line) two (2) times a week. over 12 hours. Taper infusion rate up 1 hr/down1 hr (Patient not taking: Reported on 8/5/2022)      omeprazole (PRILOSEC) 20 mg capsule Take 20 mg by mouth daily. sodium chloride (NS) 10-20 mL by IntraVENous Push route daily.  before and after TPN and/or labs      dronabinol (MARINOL) 5 mg capsule Take 1 Cap by mouth two (2) times daily as needed (appetite). Max Daily Amount: 10 mg. (Patient taking differently: Take 5 mg by mouth every six (6) hours as needed for Nausea.) 60 Cap 0    ferrous sulfate 325 mg (65 mg iron) tablet Take 1 Tab by mouth Daily (before breakfast). (Patient taking differently: Take 325 mg by mouth every other day.) 30 Tab 2    promethazine (PHENERGAN) 25 mg tablet Take 25 mg by mouth every six (6) hours as needed for Nausea. No current facility-administered medications on file prior to visit. Allergies   Allergen Reactions    [place] Anaphylaxis    Meperidine Rash and Unknown (comments)     Other reaction(s): SEVERE NAUSEA  Reaction Type: Allergy      Sulfa (Sulfonamide Antibiotics) Anaphylaxis    [place] Anaphylaxis    Carisoprodol Rash, Nausea Only and Other (comments)     Other reaction(s): RASH, ITCHING  Reaction Type: Allergy; Reaction(s): hives and itching      Sumatriptan Nausea Only    Toradol [Ketorolac Tromethamine] Nausea and Vomiting     Patient Active Problem List    Diagnosis    Prediabetes    Secondary diabetes ([address]     Due to blocked pancreatic duct. BS now well controlled. Resolved? Pure hypercholesterolemia    Right renal mass     Follows with urology oncology      Intestinal infection     On chronic Cipro and Flagyl for this. Vitamin D deficiency    Cyst of left kidney     S/P ablation      Chronic anemia    Small bowel fistula     Follow-up with GI, and surgery      Morbid obesity (HCC)    Wound, open, anterior abdominal wall    Enterocutaneous fistula     Follow-up with GI, and surgery      Abdominal pain     Follows with Pain doctor      Perforated bowel ([address]    Crohn's disease ([address]     Follows with GI      Incarcerated ventral hernia     History of           Review of Systems   Constitutional:  Negative for fever. Cardiovascular:  Negative for chest pain. Gastrointestinal:  Positive for abdominal pain. Genitourinary:  Negative for dysuria. Objective:   Vital Signs: (As obtained by patient/caregiver at home)  There were no vitals taken for this visit. [INSTRUCTIONS:  \"[x]\" Indicates a positive item  \"[]\" Indicates a negative item  -- DELETE ALL ITEMS NOT EXAMINED]    Constitutional: [x] Appears well-developed and well-nourished [x] No apparent distress      [] Abnormal -     Mental status: [x] Alert and awake  [x] Oriented to person/place/time [x] Able to follow commands    [] Abnormal -     Eyes:   EOM    [x]  Normal    [] Abnormal -   Sclera  [x]  Normal    [] Abnormal -          Discharge [x]  None visible   [] Abnormal -     HENT: [x] Normocephalic, atraumatic  [] Abnormal -   [x] Mouth/Throat: Mucous membranes are moist    External Ears [x] Normal  [] Abnormal -    Neck: [x] No visualized mass [] Abnormal -     Pulmonary/Chest: [x] Respiratory effort normal   [x] No visualized signs of difficulty breathing or respiratory distress        [] Abnormal -        Neurological:        [x] No Facial Asymmetry (Cranial nerve 7 motor function) (limited exam due to video visit)          [x] No gaze palsy        [] Abnormal -          Skin:        [x] No significant exanthematous lesions or discoloration noted on facial skin         [] Abnormal -            Psychiatric:       [x] Normal Affect [] Abnormal -        [x] No Hallucinations    Other pertinent observable physical exam findings:-              Assessment & Plan:   Diagnoses and all orders for this visit:    1. Enterocutaneous fistula    2. Small bowel fistula    3. Intestinal infection    4. Crohn's disease of small intestine with other complication ([facility]      Recovering from recent surgery. Continues in facility. Continues to follow with multiple specialists at [facility]. Documentation reviewed. We discussed the expected course, resolution and complications of the diagnosis(es) in detail.   Medication risks, benefits, costs, interactions, and alternatives were discussed as indicated. I advised her to contact the office if her condition worsens, changes or fails to improve as anticipated. She expressed understanding with the diagnosis(es) and plan. Shruthi Dodson is a 42 y.o. female being evaluated by a video visit encounter for concerns as above. A caregiver was present when appropriate. Due to this being a TeleHealth encounter (During [facility]- public health emergency), evaluation of the following organ systems was limited: Vitals/Constitutional/EENT/Resp/CV/GI//MS/Neuro/Skin/Heme-Lymph-Imm. Pursuant to the emergency declaration under the [facility], 1135 waiver authority and the [employer] and [employer] Act, this Virtual  Visit was conducted, with patient's (and/or legal guardian's) consent, to reduce the patient's risk of exposure to COVID-19 and provide necessary medical care. Services were provided through a video synchronous discussion virtually to substitute for in-person clinic visit. Patient and provider were located at their individual homes.         Josetta Peabody, MD

## 2022-09-16 DIAGNOSIS — K63.2 SMALL BOWEL FISTULA: Primary | ICD-10-CM

## 2022-09-16 DIAGNOSIS — G62.9 NEUROPATHY: ICD-10-CM

## 2022-09-21 RX ORDER — LOPERAMIDE HYDROCHLORIDE 2 MG/1
6 CAPSULE ORAL
Qty: 90 CAPSULE | Refills: 2 | Status: SHIPPED | OUTPATIENT
Start: 2022-09-21

## 2022-09-21 RX ORDER — ENOXAPARIN SODIUM 100 MG/ML
110 INJECTION SUBCUTANEOUS EVERY 12 HOURS
Qty: 66 ML | Refills: 2 | Status: SHIPPED | OUTPATIENT
Start: 2022-09-21 | End: 2022-09-23 | Stop reason: SDUPTHER

## 2022-09-21 RX ORDER — GABAPENTIN 800 MG/1
800 TABLET ORAL 3 TIMES DAILY
Qty: 270 TABLET | Refills: 1 | Status: SHIPPED | OUTPATIENT
Start: 2022-09-21

## 2022-09-22 ENCOUNTER — TELEPHONE (OUTPATIENT)
Dept: FAMILY MEDICINE CLINIC | Age: 47
End: 2022-09-22

## 2022-09-22 NOTE — TELEPHONE ENCOUNTER
Pharmacy called to verify the dosage of the enoxaparin script says 100 mg but directions say 110 mg .

## 2022-09-23 ENCOUNTER — TELEPHONE (OUTPATIENT)
Dept: FAMILY MEDICINE CLINIC | Age: 47
End: 2022-09-23

## 2022-09-23 DIAGNOSIS — I82.412 ACUTE DEEP VEIN THROMBOSIS (DVT) OF FEMORAL VEIN OF LEFT LOWER EXTREMITY (HCC): Primary | ICD-10-CM

## 2022-09-23 RX ORDER — ENOXAPARIN SODIUM 100 MG/ML
110 INJECTION SUBCUTANEOUS EVERY 12 HOURS
Qty: 66 ML | Refills: 2 | Status: SHIPPED | OUTPATIENT
Start: 2022-09-23 | End: 2022-12-22

## 2022-09-23 NOTE — TELEPHONE ENCOUNTER
Lovenox order was resent to the pharmacy. Please call patient and find out exactly when she started Lovenox.

## 2022-09-23 NOTE — TELEPHONE ENCOUNTER
Pharmacy calling regarding instructions for medication enoxaparin. Directions say 110 mg and 100 mg, what is the correct dosage?

## 2022-10-27 ENCOUNTER — TELEPHONE (OUTPATIENT)
Dept: FAMILY MEDICINE CLINIC | Age: 47
End: 2022-10-27

## 2022-10-27 NOTE — TELEPHONE ENCOUNTER
Patient had a doppler of both legs last week and patient would like the results. Please give her a call.

## 2022-10-31 ENCOUNTER — APPOINTMENT (OUTPATIENT)
Dept: CT IMAGING | Age: 47
End: 2022-10-31
Attending: EMERGENCY MEDICINE
Payer: MEDICARE

## 2022-10-31 ENCOUNTER — HOSPITAL ENCOUNTER (EMERGENCY)
Age: 47
Discharge: HOME | End: 2022-10-31
Attending: EMERGENCY MEDICINE
Payer: MEDICARE

## 2022-10-31 VITALS
SYSTOLIC BLOOD PRESSURE: 111 MMHG | TEMPERATURE: 98.5 F | DIASTOLIC BLOOD PRESSURE: 65 MMHG | HEART RATE: 89 BPM | BODY MASS INDEX: 47.57 KG/M2 | RESPIRATION RATE: 13 BRPM | OXYGEN SATURATION: 96 % | WEIGHT: 278.66 LBS | HEIGHT: 64 IN

## 2022-10-31 DIAGNOSIS — G40.919 BREAKTHROUGH SEIZURE (HCC): Primary | ICD-10-CM

## 2022-10-31 LAB
ALBUMIN SERPL-MCNC: 2.9 G/DL (ref 3.5–5.2)
ALBUMIN/GLOB SERPL: 0.7 {RATIO} (ref 1.1–2.2)
ALP SERPL-CCNC: 136 U/L (ref 35–104)
ALT SERPL-CCNC: 10 U/L (ref 10–35)
ANION GAP SERPL CALC-SCNC: 10 MMOL/L (ref 5–15)
AST SERPL-CCNC: 20 U/L (ref 10–35)
BASOPHILS # BLD: 0 K/UL (ref 0–0.1)
BASOPHILS NFR BLD: 0 % (ref 0–1)
BILIRUB SERPL-MCNC: 0.3 MG/DL (ref 0.2–1)
BUN SERPL-MCNC: 16 MG/DL (ref 6–20)
BUN/CREAT SERPL: 26 (ref 12–20)
CALCIUM SERPL-MCNC: 8.7 MG/DL (ref 8.6–10)
CHLORIDE SERPL-SCNC: 101 MMOL/L (ref 98–107)
CO2 SERPL-SCNC: 27 MMOL/L (ref 22–29)
COMMENT, HOLDF: NORMAL
CREAT SERPL-MCNC: 0.61 MG/DL (ref 0.5–0.9)
DIFFERENTIAL METHOD BLD: ABNORMAL
EOSINOPHIL # BLD: 0.2 K/UL (ref 0–0.4)
EOSINOPHIL NFR BLD: 2 % (ref 0–7)
ERYTHROCYTE [DISTWIDTH] IN BLOOD BY AUTOMATED COUNT: 17.2 % (ref 11.5–14.5)
GLOBULIN SER CALC-MCNC: 4 G/DL (ref 2–4)
GLUCOSE BLD STRIP.AUTO-MCNC: 140 MG/DL (ref 65–117)
GLUCOSE SERPL-MCNC: 159 MG/DL (ref 65–100)
HCT VFR BLD AUTO: 32.4 % (ref 35–47)
HGB BLD-MCNC: 9.8 G/DL (ref 11.5–16)
IMM GRANULOCYTES # BLD AUTO: 0.1 K/UL (ref 0–0.04)
IMM GRANULOCYTES NFR BLD AUTO: 1 % (ref 0–0.5)
LYMPHOCYTES # BLD: 2.8 K/UL (ref 0.8–3.5)
LYMPHOCYTES NFR BLD: 30 % (ref 12–49)
MCH RBC QN AUTO: 27.5 PG (ref 26–34)
MCHC RBC AUTO-ENTMCNC: 30.2 G/DL (ref 30–36.5)
MCV RBC AUTO: 91 FL (ref 80–99)
MONOCYTES # BLD: 0.6 K/UL (ref 0–1)
MONOCYTES NFR BLD: 6 % (ref 5–13)
NEUTS SEG # BLD: 5.7 K/UL (ref 1.8–8)
NEUTS SEG NFR BLD: 61 % (ref 32–75)
NRBC # BLD: 0 K/UL (ref 0–0.01)
NRBC BLD-RTO: 0 PER 100 WBC
PLATELET # BLD AUTO: 291 K/UL (ref 150–400)
PMV BLD AUTO: 11.5 FL (ref 8.9–12.9)
POTASSIUM SERPL-SCNC: 4.4 MMOL/L (ref 3.5–5.1)
PROT SERPL-MCNC: 6.9 G/DL (ref 6.4–8.3)
RBC # BLD AUTO: 3.56 M/UL (ref 3.8–5.2)
SAMPLES BEING HELD,HOLD: NORMAL
SERVICE CMNT-IMP: ABNORMAL
SODIUM SERPL-SCNC: 138 MMOL/L (ref 136–145)
WBC # BLD AUTO: 9.3 K/UL (ref 3.6–11)

## 2022-10-31 PROCEDURE — 80053 COMPREHEN METABOLIC PANEL: CPT

## 2022-10-31 PROCEDURE — 74011250636 HC RX REV CODE- 250/636: Performed by: EMERGENCY MEDICINE

## 2022-10-31 PROCEDURE — 96374 THER/PROPH/DIAG INJ IV PUSH: CPT

## 2022-10-31 PROCEDURE — 93005 ELECTROCARDIOGRAM TRACING: CPT

## 2022-10-31 PROCEDURE — 85025 COMPLETE CBC W/AUTO DIFF WBC: CPT

## 2022-10-31 PROCEDURE — 99284 EMERGENCY DEPT VISIT MOD MDM: CPT

## 2022-10-31 PROCEDURE — 70450 CT HEAD/BRAIN W/O DYE: CPT

## 2022-10-31 PROCEDURE — 36415 COLL VENOUS BLD VENIPUNCTURE: CPT

## 2022-10-31 RX ORDER — MORPHINE SULFATE 4 MG/ML
4 INJECTION INTRAVENOUS
Status: COMPLETED | OUTPATIENT
Start: 2022-10-31 | End: 2022-10-31

## 2022-10-31 RX ADMIN — SODIUM CHLORIDE 1000 ML: 9 INJECTION, SOLUTION INTRAVENOUS at 12:52

## 2022-10-31 RX ADMIN — MORPHINE SULFATE 4 MG: 4 INJECTION INTRAVENOUS at 13:57

## 2022-10-31 NOTE — ED TRIAGE NOTES
Pt reports to ED via EMS w/ cc of seizure and upper back pain. Pt reports 9/10 pain in back. Pt cannot remember what happened at home prior to seizure, friend called EMS. Pt orientated to place not time.
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
## 2022-10-31 NOTE — ED NOTES
ADDITIONAL TRIAGE:  Patient has hx of seizure, most recent in 2014 per patient and friend. Pt has ostomy present r/t crohn's disease and right subclavian PICC line for TPN and daily protonix injections.

## 2022-10-31 NOTE — ED NOTES
Ostomy emptied by this RN and Sylvester Perea RN. Pt requesting morphine for chronic back pain, this RN to inform Gordon DOTSON.

## 2022-10-31 NOTE — ED NOTES
Pt and friend given discharge instructions, patient education, 0 prescriptions, and follow up information for [facility]. Pt verbalizes understanding. All questions answered. Pt discharged to home in private vehicle, ambulatory with use of wheelchair for chronic back pain. Pt A/Ox4, RA, pain controlled.

## 2022-11-01 LAB
ATRIAL RATE: 112 BPM
CALCULATED P AXIS, ECG09: 31 DEGREES
CALCULATED R AXIS, ECG10: 31 DEGREES
CALCULATED T AXIS, ECG11: 61 DEGREES
DIAGNOSIS, 93000: NORMAL
P-R INTERVAL, ECG05: 128 MS
Q-T INTERVAL, ECG07: 332 MS
QRS DURATION, ECG06: 72 MS
QTC CALCULATION (BEZET), ECG08: 453 MS
VENTRICULAR RATE, ECG03: 112 BPM

## 2022-11-09 ENCOUNTER — VIRTUAL VISIT (OUTPATIENT)
Dept: FAMILY MEDICINE CLINIC | Age: 47
End: 2022-11-09
Payer: MEDICARE

## 2022-11-09 DIAGNOSIS — G40.909 SEIZURE DISORDER (HCC): ICD-10-CM

## 2022-11-09 DIAGNOSIS — R59.1 LYMPHADENOPATHY: ICD-10-CM

## 2022-11-09 DIAGNOSIS — K63.2 SMALL BOWEL FISTULA: ICD-10-CM

## 2022-11-09 DIAGNOSIS — H92.09 EAR DISCOMFORT, UNSPECIFIED LATERALITY: ICD-10-CM

## 2022-11-09 DIAGNOSIS — Z09 HOSPITAL DISCHARGE FOLLOW-UP: Primary | ICD-10-CM

## 2022-11-09 DIAGNOSIS — K50.018 CROHN'S DISEASE OF SMALL INTESTINE WITH OTHER COMPLICATION (HCC): ICD-10-CM

## 2022-11-09 PROBLEM — K63.1 PERFORATED BOWEL (HCC): Status: RESOLVED | Noted: 2017-06-06 | Resolved: 2022-11-09

## 2022-11-09 PROBLEM — R10.9 ABDOMINAL PAIN: Status: RESOLVED | Noted: 2017-06-25 | Resolved: 2022-11-09

## 2022-11-09 PROBLEM — S31.109A WOUND, OPEN, ANTERIOR ABDOMINAL WALL: Status: RESOLVED | Noted: 2017-10-16 | Resolved: 2022-11-09

## 2022-11-09 PROCEDURE — 99214 OFFICE O/P EST MOD 30 MIN: CPT | Performed by: NURSE PRACTITIONER

## 2022-11-09 PROCEDURE — G8427 DOCREV CUR MEDS BY ELIG CLIN: HCPCS | Performed by: NURSE PRACTITIONER

## 2022-11-09 PROCEDURE — G8432 DEP SCR NOT DOC, RNG: HCPCS | Performed by: NURSE PRACTITIONER

## 2022-11-09 PROCEDURE — G8417 CALC BMI ABV UP PARAM F/U: HCPCS | Performed by: NURSE PRACTITIONER

## 2022-11-09 RX ORDER — PREDNISONE 20 MG/1
20 TABLET ORAL
Qty: 7 TABLET | Refills: 0 | Status: SHIPPED | OUTPATIENT
Start: 2022-11-09

## 2022-11-09 RX ORDER — CYCLOBENZAPRINE HCL 10 MG
10 TABLET ORAL
Qty: 90 TABLET | Refills: 1 | Status: SHIPPED | OUTPATIENT
Start: 2022-11-09
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
## 2022-11-11 ENCOUNTER — PATIENT MESSAGE (OUTPATIENT)
Dept: FAMILY MEDICINE CLINIC | Age: 47
End: 2022-11-11

## 2022-11-11 RX ORDER — AMOXICILLIN 500 MG/1
500 CAPSULE ORAL 2 TIMES DAILY
Qty: 10 CAPSULE | Refills: 0 | Status: SHIPPED | OUTPATIENT
Start: 2022-11-11 | End: 2022-11-16

## 2022-11-11 NOTE — TELEPHONE ENCOUNTER
From: Wilder Tin  To: Tasha Herring NP  Sent: 11/11/2022 12:28 PM EST  Subject: Antibiotic script    You requested I wait until today to see if I still needed them and I do. Would you please send to my [facility] in [place]? The steroids seem to be helping but the other script, Cyclobenzaprine, doesn't. Still painful to raise left arm. Just keeping you informed. Thank you for everything. ..  Dheeraj Ventura

## 2022-11-25 ENCOUNTER — TELEPHONE (OUTPATIENT)
Dept: SURGERY | Age: 47
End: 2022-11-25

## 2022-11-25 NOTE — TELEPHONE ENCOUNTER
I returned call to [facility]. Account # [de-identified]    I advised the agent to refax the order I will leave for the MD to sign.

## 2022-11-25 NOTE — TELEPHONE ENCOUNTER
May with [facility] called wanting to know the status of the recent Ostomy supply order request. Stated they faxed a request on 11/21 and will refax a request today, 11/25.

## 2022-11-30 ENCOUNTER — DOCUMENTATION ONLY (OUTPATIENT)
Dept: SURGERY | Age: 47
End: 2022-11-30

## 2022-11-30 NOTE — PROGRESS NOTES
[address] order signed by PCP Dr. Agustina Hendricks on 11/29/2022. His office faxed the signed order back to us.     The order was faxed to [facility].

## 2022-12-08 ENCOUNTER — TRANSCRIBE ORDER (OUTPATIENT)
Dept: SCHEDULING | Age: 47
End: 2022-12-08

## 2022-12-08 DIAGNOSIS — S22.050A WEDGE COMPRESSION FRACTURE OF T6 VERTEBRA (HCC): Primary | ICD-10-CM

## 2022-12-08 DIAGNOSIS — S49.92XA INJURY OF LEFT UPPER ARM: Primary | ICD-10-CM

## 2022-12-09 PROBLEM — Z09 HOSPITAL DISCHARGE FOLLOW-UP: Status: RESOLVED | Noted: 2022-11-09 | Resolved: 2022-12-09

## 2022-12-21 ENCOUNTER — PATIENT MESSAGE (OUTPATIENT)
Dept: FAMILY MEDICINE CLINIC | Age: 47
End: 2022-12-21

## 2022-12-21 ENCOUNTER — HOSPITAL ENCOUNTER (OUTPATIENT)
Dept: MRI IMAGING | Age: 47
Discharge: HOME | End: 2022-12-21
Attending: PHYSICAL MEDICINE & REHABILITATION
Payer: MEDICARE

## 2022-12-21 ENCOUNTER — HOSPITAL ENCOUNTER (OUTPATIENT)
Dept: MRI IMAGING | Age: 47
End: 2022-12-21
Attending: PHYSICAL MEDICINE & REHABILITATION
Payer: MEDICARE

## 2022-12-21 DIAGNOSIS — B37.9 YEAST INFECTION: Primary | ICD-10-CM

## 2022-12-21 DIAGNOSIS — S22.050A WEDGE COMPRESSION FRACTURE OF T6 VERTEBRA (HCC): ICD-10-CM

## 2022-12-21 PROCEDURE — 72146 MRI CHEST SPINE W/O DYE: CPT

## 2022-12-21 RX ORDER — METRONIDAZOLE 500 MG/1
500 TABLET ORAL 2 TIMES DAILY
Qty: 14 TABLET | Refills: 0 | Status: SHIPPED | OUTPATIENT
Start: 2022-12-21 | End: 2022-12-28

## 2023-01-01 RX ORDER — FLUCONAZOLE 150 MG/1
150 TABLET ORAL DAILY
Qty: 1 TABLET | Refills: 0 | Status: SHIPPED | OUTPATIENT
Start: 2023-01-01 | End: 2023-01-02

## 2023-01-01 NOTE — TELEPHONE ENCOUNTER
Carmita PHIL Worrell 70/74/2343 0:49 AM EST    ----- Message from Rosalinda Bianchi LPN sent at 19/67/0788 9:51 AM EST -----       ----- Message from Baljeet Rivera to Khoa Ac MD sent at 12/30/2022 9:44 AM -----   Dr Tonya Huggins I finished the Flagyl but I think I'll need to either get a refill or try the diflucan, as you mentioned in the previous conversation. Thank you in advance for your help. Have a great, safe New Years! Pao Ernestina      ----- Message -----   From:Octavia Kwong   Sent:12/21/2022 4:01 PM EST   To:Patient Medical Advice Request Message List   Subject:Antibiotics     Meme Harvey do and thank you so much for your quick reply! Have an amazing holiday doc! Pao Do      ----- Message -----   Ailyn Ac MD   Sent:12/21/2022 2:55 PM EST   To:Octavia Kwong   Subject:Antibiotics     Good afternoon,    Yes I will send in Flagyl for you. But if symptoms do not improve please let me know as we may need to try another medication such as Diflucan. Have a great holiday as well!!!!    -SHARDA      ----- Message -----   From:Octavia Kwong   Sent:12/21/2022 10:18 AM EST   Silverio Julien MD   Subject:Antibiotics     Hi Dr Tonya Huggins. .. Dr Toney Carrasco (surgeon at [facility]) put me on Clindamycin for a slight infection near the surgical site about 6/7 days ago and due to this script I've got either a yeast infection or a UTI. Symptoms for me are similar for both. In the past Flagyl usually takes care of it. I asked him about giving me a script and was directed to follow up with you. Would you please help me and write me a script for a round of flagyl? Thank you in advance for your assistance. And happy holidays to you and your staff. Hope you all have a wonderful holiday!   Pao Do
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
## 2023-01-17 ENCOUNTER — HOSPITAL ENCOUNTER (EMERGENCY)
Age: 48
Discharge: OTHER INSTITUTION | End: 2023-01-18
Attending: STUDENT IN AN ORGANIZED HEALTH CARE EDUCATION/TRAINING PROGRAM
Payer: MEDICARE

## 2023-01-17 ENCOUNTER — APPOINTMENT (OUTPATIENT)
Dept: CT IMAGING | Age: 48
End: 2023-01-17
Attending: STUDENT IN AN ORGANIZED HEALTH CARE EDUCATION/TRAINING PROGRAM
Payer: MEDICARE

## 2023-01-17 VITALS
TEMPERATURE: 98.2 F | OXYGEN SATURATION: 95 % | HEIGHT: 63 IN | HEART RATE: 95 BPM | WEIGHT: 235 LBS | BODY MASS INDEX: 41.64 KG/M2 | DIASTOLIC BLOOD PRESSURE: 60 MMHG | SYSTOLIC BLOOD PRESSURE: 114 MMHG | RESPIRATION RATE: 17 BRPM

## 2023-01-17 DIAGNOSIS — L03.311 ABDOMINAL WALL CELLULITIS: Primary | ICD-10-CM

## 2023-01-17 DIAGNOSIS — K65.1 INTRA-ABDOMINAL ABSCESS (HCC): ICD-10-CM

## 2023-01-17 LAB
ALBUMIN SERPL-MCNC: 3.1 G/DL (ref 3.5–5.2)
ALBUMIN/GLOB SERPL: 0.8 (ref 1.1–2.2)
ALP SERPL-CCNC: 201 U/L (ref 35–104)
ALT SERPL-CCNC: 30 U/L (ref 10–35)
ANION GAP SERPL CALC-SCNC: 13 MMOL/L (ref 5–15)
APPEARANCE UR: CLEAR
AST SERPL-CCNC: 20 U/L (ref 10–35)
BACTERIA URNS QL MICRO: NEGATIVE /HPF
BASOPHILS # BLD: 0 K/UL (ref 0–1)
BASOPHILS NFR BLD: 0 % (ref 0–1)
BILIRUB SERPL-MCNC: 0.8 MG/DL (ref 0.2–1)
BILIRUB UR QL: NEGATIVE
BUN SERPL-MCNC: 23 MG/DL (ref 6–20)
BUN/CREAT SERPL: 30 (ref 12–20)
CALCIUM SERPL-MCNC: 9.4 MG/DL (ref 8.6–10)
CHLORIDE SERPL-SCNC: 97 MMOL/L (ref 98–107)
CO2 SERPL-SCNC: 27 MMOL/L (ref 22–29)
COLOR UR: ABNORMAL
COVID-19 RAPID TEST, COVR: NOT DETECTED
CREAT SERPL-MCNC: 0.77 MG/DL (ref 0.5–0.9)
DIFFERENTIAL METHOD BLD: ABNORMAL
EOSINOPHIL # BLD: 0.2 K/UL (ref 0–0.4)
EOSINOPHIL NFR BLD: 2 %
EPITH CASTS URNS QL MICRO: ABNORMAL /LPF
ERYTHROCYTE [DISTWIDTH] IN BLOOD BY AUTOMATED COUNT: 14.2 % (ref 11.5–14.5)
GLOBULIN SER CALC-MCNC: 4.1 G/DL (ref 2–4)
GLUCOSE SERPL-MCNC: 153 MG/DL (ref 65–100)
GLUCOSE UR STRIP.AUTO-MCNC: NEGATIVE MG/DL
HCT VFR BLD AUTO: 35.6 % (ref 35–47)
HGB BLD-MCNC: 12 G/DL (ref 11.5–16)
HGB UR QL STRIP: ABNORMAL
IMM GRANULOCYTES # BLD AUTO: 0.1 K/UL (ref 0–0.04)
IMM GRANULOCYTES NFR BLD AUTO: 0 % (ref 0–0.5)
KETONES UR QL STRIP.AUTO: NEGATIVE MG/DL
LACTATE BLD-SCNC: 1.62 MMOL/L (ref 0.4–2)
LEUKOCYTE ESTERASE UR QL STRIP.AUTO: ABNORMAL
LYMPHOCYTES # BLD: 2.3 K/UL (ref 0.8–3.5)
LYMPHOCYTES NFR BLD: 21 % (ref 12–49)
MCH RBC QN AUTO: 30.3 PG (ref 26–34)
MCHC RBC AUTO-ENTMCNC: 33.7 G/DL (ref 30–36.5)
MCV RBC AUTO: 89.9 FL (ref 80–99)
MONOCYTES # BLD: 0.9 K/UL (ref 0–1)
MONOCYTES NFR BLD: 8 % (ref 5–13)
NEUTS SEG # BLD: 7.7 K/UL (ref 1.8–8)
NEUTS SEG NFR BLD: 69 % (ref 32–75)
NITRITE UR QL STRIP.AUTO: NEGATIVE
NRBC # BLD: 0 K/UL (ref 0–0.01)
NRBC BLD-RTO: 0 PER 100 WBC
PH UR STRIP: 6.5 (ref 5–8)
PLATELET # BLD AUTO: 274 K/UL (ref 150–400)
PMV BLD AUTO: 10.5 FL (ref 8.9–12.9)
POTASSIUM SERPL-SCNC: 3.9 MMOL/L (ref 3.5–5.1)
PROT SERPL-MCNC: 7.2 G/DL (ref 6.4–8.3)
PROT UR STRIP-MCNC: NEGATIVE MG/DL
RBC # BLD AUTO: 3.96 M/UL (ref 3.8–5.2)
RBC #/AREA URNS HPF: ABNORMAL /HPF
SODIUM SERPL-SCNC: 137 MMOL/L (ref 136–145)
SOURCE, COVRS: NORMAL
SP GR UR REFRACTOMETRY: 1.01 (ref 1–1.03)
UA: UC IF INDICATED,UAUC: ABNORMAL
UROBILINOGEN UR QL STRIP.AUTO: 0.2 EU/DL (ref 0.2–1)
WBC # BLD AUTO: 11.2 K/UL (ref 3.6–11)
WBC URNS QL MICRO: ABNORMAL /HPF (ref 0–4)

## 2023-01-17 PROCEDURE — 85025 COMPLETE CBC W/AUTO DIFF WBC: CPT

## 2023-01-17 PROCEDURE — 74011250636 HC RX REV CODE- 250/636: Performed by: STUDENT IN AN ORGANIZED HEALTH CARE EDUCATION/TRAINING PROGRAM

## 2023-01-17 PROCEDURE — 80053 COMPREHEN METABOLIC PANEL: CPT

## 2023-01-17 PROCEDURE — 87040 BLOOD CULTURE FOR BACTERIA: CPT

## 2023-01-17 PROCEDURE — 96361 HYDRATE IV INFUSION ADD-ON: CPT

## 2023-01-17 PROCEDURE — 81001 URINALYSIS AUTO W/SCOPE: CPT

## 2023-01-17 PROCEDURE — 96365 THER/PROPH/DIAG IV INF INIT: CPT

## 2023-01-17 PROCEDURE — 96375 TX/PRO/DX INJ NEW DRUG ADDON: CPT

## 2023-01-17 PROCEDURE — 74177 CT ABD & PELVIS W/CONTRAST: CPT

## 2023-01-17 PROCEDURE — 74011250637 HC RX REV CODE- 250/637: Performed by: STUDENT IN AN ORGANIZED HEALTH CARE EDUCATION/TRAINING PROGRAM

## 2023-01-17 PROCEDURE — 74011000636 HC RX REV CODE- 636: Performed by: STUDENT IN AN ORGANIZED HEALTH CARE EDUCATION/TRAINING PROGRAM

## 2023-01-17 PROCEDURE — 96376 TX/PRO/DX INJ SAME DRUG ADON: CPT

## 2023-01-17 PROCEDURE — 87635 SARS-COV-2 COVID-19 AMP PRB: CPT

## 2023-01-17 PROCEDURE — 99285 EMERGENCY DEPT VISIT HI MDM: CPT

## 2023-01-17 PROCEDURE — 74011000258 HC RX REV CODE- 258: Performed by: STUDENT IN AN ORGANIZED HEALTH CARE EDUCATION/TRAINING PROGRAM

## 2023-01-17 PROCEDURE — 36415 COLL VENOUS BLD VENIPUNCTURE: CPT

## 2023-01-17 RX ORDER — SODIUM CHLORIDE 0.9 % (FLUSH) 0.9 %
5-10 SYRINGE (ML) INJECTION AS NEEDED
Status: DISCONTINUED | OUTPATIENT
Start: 2023-01-17 | End: 2023-01-18 | Stop reason: HOSPADM

## 2023-01-17 RX ORDER — HYDROMORPHONE HYDROCHLORIDE 1 MG/ML
1 INJECTION, SOLUTION INTRAMUSCULAR; INTRAVENOUS; SUBCUTANEOUS ONCE
Status: COMPLETED | OUTPATIENT
Start: 2023-01-17 | End: 2023-01-17

## 2023-01-17 RX ORDER — ACETAMINOPHEN 500 MG
1000 TABLET ORAL
Status: COMPLETED | OUTPATIENT
Start: 2023-01-17 | End: 2023-01-17

## 2023-01-17 RX ORDER — SODIUM CHLORIDE 9 MG/ML
1000 INJECTION, SOLUTION INTRAVENOUS ONCE
Status: COMPLETED | OUTPATIENT
Start: 2023-01-17 | End: 2023-01-17

## 2023-01-17 RX ORDER — MORPHINE SULFATE 4 MG/ML
4 INJECTION INTRAVENOUS
Status: COMPLETED | OUTPATIENT
Start: 2023-01-17 | End: 2023-01-17

## 2023-01-17 RX ADMIN — HYDROMORPHONE HYDROCHLORIDE 1 MG: 1 INJECTION, SOLUTION INTRAMUSCULAR; INTRAVENOUS; SUBCUTANEOUS at 20:03

## 2023-01-17 RX ADMIN — HYDROMORPHONE HYDROCHLORIDE 1 MG: 1 INJECTION, SOLUTION INTRAMUSCULAR; INTRAVENOUS; SUBCUTANEOUS at 23:40

## 2023-01-17 RX ADMIN — VANCOMYCIN HYDROCHLORIDE 1250 MG: 1.25 INJECTION, POWDER, LYOPHILIZED, FOR SOLUTION INTRAVENOUS at 20:53

## 2023-01-17 RX ADMIN — PIPERACILLIN AND TAZOBACTAM 4.5 G: 4; .5 INJECTION, POWDER, FOR SOLUTION INTRAVENOUS at 20:09

## 2023-01-17 RX ADMIN — VANCOMYCIN HYDROCHLORIDE 1250 MG: 1.25 INJECTION, POWDER, LYOPHILIZED, FOR SOLUTION INTRAVENOUS at 20:54

## 2023-01-17 RX ADMIN — MORPHINE SULFATE 4 MG: 4 INJECTION INTRAVENOUS at 18:12

## 2023-01-17 RX ADMIN — SODIUM CHLORIDE 1000 ML: 9 INJECTION, SOLUTION INTRAVENOUS at 21:00

## 2023-01-17 RX ADMIN — IOPAMIDOL 100 ML: 755 INJECTION, SOLUTION INTRAVENOUS at 18:26

## 2023-01-17 RX ADMIN — ACETAMINOPHEN 1000 MG: 500 TABLET ORAL at 22:41

## 2023-01-17 NOTE — ED TRIAGE NOTES
Pt started with redness and abscess to the lower abd last Thursday. Was started on clindamycin. States the area is getting worse.
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
## 2023-01-18 NOTE — ED NOTES
7:26 PM  Change of shift. Care of patient taken over from Dr. Gaston Guan; H&P reviewed, handoff complete. Awaiting labs/imaging/consultant. ED Course as of 01/17/23 2346 Tue Jan 17, 2023 1936 Abdominal wall cellulitis. [facility] surgery consulted. Will need admit regardless. Vanc and zosyn given. [AL]      ED Course User Index  [AL] Ching Farooq MD       MEDICATIONS GIVEN:  Medications   0.9% sodium chloride infusion 1,000 mL (0 mL IntraVENous Stopped 1/17/23 2200)   morphine injection 4 mg (4 mg IntraVENous Given 1/17/23 1812)   piperacillin-tazobactam (ZOSYN) 4.5 g in 0.9% sodium chloride (MBP/ADV) 100 mL MBP (0 g IntraVENous IV Completed 1/17/23 2039)   vancomycin (VANCOCIN) 1,250 mg in 0.9% sodium chloride 250 mL ([id number]) (1,250 mg IntraVENous Given 1/17/23 2054)     And   vancomycin (VANCOCIN) 1,250 mg in 0.9% sodium chloride 250 mL ([id number]) (1,250 mg IntraVENous Given 1/17/23 2053)   iopamidoL (ISOVUE-370) 370 mg iodine /mL (76 %) injection 100 mL (100 mL IntraVENous Given 1/17/23 1826)   HYDROmorphone (DILAUDID) injection 1 mg (1 mg IntraVENous Given 1/17/23 2003)   acetaminophen (TYLENOL) tablet 1,000 mg (1,000 mg Oral Given 1/17/23 2241)   HYDROmorphone (DILAUDID) injection 1 mg (1 mg IntraVENous Given 1/17/23 2340)       MDM: Patient CT shows large intra-abdominal abscess. [facility] plastic surgery consulted as their group did the surgery most recently. Discussed patient with Dr. Sherry Hilliard. He believes the patient needs admission and further evaluation as well as IV antibiotics and likely washout in 24 to 48 hours. Patient was accepted for ED to ED transfer to [facility]. Patient given additional pain medications here in the ED. Patient transferred in stable condition to [facility]. IMPRESSION:  1. Abdominal wall cellulitis    2. Intra-abdominal abscess ([address]        DISPOSITION: Transferred to Another [address] [place], [place] [address]Lorraine Swenson MD

## 2023-01-18 NOTE — ED NOTES
TRANSFER - OUT REPORT:    Verbal report given to Jordi ray (name) on Jeffrey Choe  being transferred to [facility] ER (unit) for routine progression of care       Report consisted of patients Situation, Background, Assessment and   Recommendations(SBAR). Information from the following report(s) SBAR, Kardex, ED Summary and MAR was reviewed with the receiving nurse. Lines:   PICC Double Lumen 10/31/22 Right (Active)        Opportunity for questions and clarification was provided.       Patient transported with:   Monitor

## 2023-01-22 LAB
BACTERIA SPEC CULT: NORMAL
BACTERIA SPEC CULT: NORMAL
SERVICE CMNT-IMP: NORMAL
SERVICE CMNT-IMP: NORMAL

## 2023-02-19 ENCOUNTER — PATIENT MESSAGE (OUTPATIENT)
Dept: FAMILY MEDICINE CLINIC | Age: 48
End: 2023-02-19

## 2023-02-19 DIAGNOSIS — B37.9 YEAST INFECTION: ICD-10-CM

## 2023-02-19 DIAGNOSIS — U07.1 COVID-19: Primary | ICD-10-CM

## 2023-02-20 RX ORDER — FLUCONAZOLE 150 MG/1
150 TABLET ORAL DAILY
Qty: 1 TABLET | Refills: 0 | Status: SHIPPED | OUTPATIENT
Start: 2023-02-20 | End: 2023-02-21

## 2023-02-20 NOTE — TELEPHONE ENCOUNTER
From: Tavo Rankin  To: Emma Rocha MD  Sent: 2/19/2023 4:53 PM EST  Subject: Positive covid test    Just tested positive on 2 covid home tests. What should I do next? I had a couple appts @ [facility] tomorrow (Mon 20th-Gastro + Nutritionist) but I'm canceling those. So do I need to get a antiviral? Should I go to hospital? I've got fluids (4.5% saline bags+tpn) and I'm now a baritone but feels like bad allergies @ worst. [place] fine yesterday. Woke up this morning feeling blah. Tired & stuffed nose. Might have a fever bc had really bad night sweats too. Oh, also have a yeast infection bc of the Amoxicillin they had me on for the abscess. Just got off that Wednesday so think it's my boil + coming off antibiotics. I will call in AM to your office to follow up. Thank you Dr. Kris Hyatt. ..   Anyi Hernandez

## 2023-02-21 RX ORDER — CYCLOBENZAPRINE HCL 10 MG
TABLET ORAL
Qty: 90 TABLET | Refills: 1 | Status: SHIPPED | OUTPATIENT
Start: 2023-02-21

## 2023-03-01 ENCOUNTER — PATIENT MESSAGE (OUTPATIENT)
Dept: FAMILY MEDICINE CLINIC | Age: 48
End: 2023-03-01

## 2023-03-01 DIAGNOSIS — N39.0 URINARY TRACT INFECTION WITHOUT HEMATURIA, SITE UNSPECIFIED: Primary | ICD-10-CM

## 2023-03-02 ENCOUNTER — CLINICAL SUPPORT (OUTPATIENT)
Dept: FAMILY MEDICINE CLINIC | Age: 48
End: 2023-03-02
Payer: MEDICARE

## 2023-03-02 DIAGNOSIS — R82.998 DARK URINE: Primary | ICD-10-CM

## 2023-03-02 LAB
BILIRUB UR QL STRIP: NORMAL
GLUCOSE UR-MCNC: NEGATIVE MG/DL
KETONES P FAST UR STRIP-MCNC: NEGATIVE MG/DL
PH UR STRIP: 5 [PH] (ref 4.6–8)
PROT UR QL STRIP: NORMAL
SP GR UR STRIP: 1.02 (ref 1–1.03)
UA UROBILINOGEN AMB POC: NORMAL (ref 0.2–1)
URINALYSIS CLARITY POC: NORMAL
URINALYSIS COLOR POC: NORMAL
URINE BLOOD POC: NEGATIVE
URINE LEUKOCYTES POC: NORMAL
URINE NITRITES POC: POSITIVE

## 2023-03-02 PROCEDURE — 81003 URINALYSIS AUTO W/O SCOPE: CPT | Performed by: STUDENT IN AN ORGANIZED HEALTH CARE EDUCATION/TRAINING PROGRAM

## 2023-03-02 RX ORDER — CIPROFLOXACIN 500 MG/1
500 TABLET ORAL 2 TIMES DAILY
Qty: 10 TABLET | Refills: 0 | Status: SHIPPED | OUTPATIENT
Start: 2023-03-02 | End: 2023-03-07

## 2023-03-02 NOTE — TELEPHONE ENCOUNTER
Yesi Class 3/2/2023 6:31 AM EST      ----- Message -----  From: Matanikabael Buzz  Sent: 3/1/2023 8:55 PM EST  To: [facility] Nurse  Subject: Urine test     After the antivirals and amoxicillin my uti came back full force. I've waited to c if it'd get better but its now not. My caretaker will drop off the specimen susan quiroga. I still have the container she picked up last time that I didn't have to use. Oh, talked with Dr Alexey Chairez today (endocrinologist) & here's what we talked about:   Follow up 6m, pcp for uti test  ^ 30units of insulin 2 tpn-call chivo  calling nurse darien if BW can be done  Tests for adrenal tumor (didn't know I had one?) It'll be mostly bloodwork at 1st...

## 2023-03-08 DIAGNOSIS — N39.0 URINARY TRACT INFECTION WITHOUT HEMATURIA, SITE UNSPECIFIED: ICD-10-CM

## 2023-03-09 LAB — BACTERIA UR CULT: ABNORMAL

## 2023-03-09 RX ORDER — CIPROFLOXACIN 500 MG/1
TABLET ORAL
Qty: 10 TABLET | Refills: 0 | Status: SHIPPED | OUTPATIENT
Start: 2023-03-09

## 2023-03-09 NOTE — TELEPHONE ENCOUNTER
Spoke with patient ref refill for Cipro - stated she finished first rx 3 days ago and still has symptoms - not as bad though.  Patient feels like she just needs a few more days of the abx

## 2023-04-04 ENCOUNTER — PATIENT MESSAGE (OUTPATIENT)
Dept: FAMILY MEDICINE CLINIC | Age: 48
End: 2023-04-04

## 2023-04-04 NOTE — TELEPHONE ENCOUNTER
From: Yamilka Manning  To: Justin Cabral MD  Sent: 4/4/2023 3:53 PM EDT  Subject: Martha Po Dr Sally Adan. I'm sick. Ashley like covid but I've done 3 tests & none +. Symptoms started Friday w sore throat (just for 24hrs), sneezing (causing horrible pain) and now its like compacted mucus in my head. I've been sleeping as much as possible, tpn an extra night here & there if needed, Saline infusions every night w/1 exception and Robitussin Cough & Cold syrup for symptoms. It's now moving in my chest and was going to ask if u would call in an inhaler like I used before when I've had these colds. .. Thank you for taking the time to read this.  Hope you have a great week   [facility]

## 2023-04-05 RX ORDER — ALBUTEROL SULFATE 90 UG/1
1 AEROSOL, METERED RESPIRATORY (INHALATION)
Qty: 18 G | Refills: 0 | Status: SHIPPED
Start: 2023-04-05

## 2023-04-07 ENCOUNTER — VIRTUAL VISIT (OUTPATIENT)
Dept: FAMILY MEDICINE CLINIC | Age: 48
End: 2023-04-07
Payer: MEDICARE

## 2023-04-07 PROCEDURE — G8432 DEP SCR NOT DOC, RNG: HCPCS | Performed by: STUDENT IN AN ORGANIZED HEALTH CARE EDUCATION/TRAINING PROGRAM

## 2023-04-07 PROCEDURE — 99214 OFFICE O/P EST MOD 30 MIN: CPT | Performed by: STUDENT IN AN ORGANIZED HEALTH CARE EDUCATION/TRAINING PROGRAM

## 2023-04-07 PROCEDURE — G8427 DOCREV CUR MEDS BY ELIG CLIN: HCPCS | Performed by: STUDENT IN AN ORGANIZED HEALTH CARE EDUCATION/TRAINING PROGRAM

## 2023-04-07 PROCEDURE — G8417 CALC BMI ABV UP PARAM F/U: HCPCS | Performed by: STUDENT IN AN ORGANIZED HEALTH CARE EDUCATION/TRAINING PROGRAM

## 2023-04-24 ENCOUNTER — OFFICE VISIT (OUTPATIENT)
Dept: FAMILY MEDICINE CLINIC | Age: 48
End: 2023-04-24
Payer: MEDICARE

## 2023-04-24 VITALS
BODY MASS INDEX: 39.34 KG/M2 | HEART RATE: 87 BPM | TEMPERATURE: 97.6 F | OXYGEN SATURATION: 98 % | SYSTOLIC BLOOD PRESSURE: 110 MMHG | RESPIRATION RATE: 16 BRPM | DIASTOLIC BLOOD PRESSURE: 78 MMHG | HEIGHT: 63 IN | WEIGHT: 222 LBS

## 2023-04-24 DIAGNOSIS — B37.9 YEAST INFECTION: Primary | ICD-10-CM

## 2023-04-24 DIAGNOSIS — N39.0 URINARY TRACT INFECTION WITHOUT HEMATURIA, SITE UNSPECIFIED: ICD-10-CM

## 2023-04-24 LAB
BILIRUB UR QL STRIP: NEGATIVE
GLUCOSE UR-MCNC: NEGATIVE MG/DL
KETONES P FAST UR STRIP-MCNC: NEGATIVE MG/DL
PH UR STRIP: 6 [PH] (ref 4.6–8)
PROT UR QL STRIP: NORMAL
SP GR UR STRIP: 1.03 (ref 1–1.03)
UA UROBILINOGEN AMB POC: NORMAL (ref 0.2–1)
URINALYSIS CLARITY POC: NORMAL
URINALYSIS COLOR POC: YELLOW
URINE BLOOD POC: NORMAL
URINE LEUKOCYTES POC: NORMAL
URINE NITRITES POC: NEGATIVE

## 2023-04-24 PROCEDURE — 99214 OFFICE O/P EST MOD 30 MIN: CPT | Performed by: STUDENT IN AN ORGANIZED HEALTH CARE EDUCATION/TRAINING PROGRAM

## 2023-04-24 PROCEDURE — G8427 DOCREV CUR MEDS BY ELIG CLIN: HCPCS | Performed by: STUDENT IN AN ORGANIZED HEALTH CARE EDUCATION/TRAINING PROGRAM

## 2023-04-24 PROCEDURE — G8510 SCR DEP NEG, NO PLAN REQD: HCPCS | Performed by: STUDENT IN AN ORGANIZED HEALTH CARE EDUCATION/TRAINING PROGRAM

## 2023-04-24 PROCEDURE — 81003 URINALYSIS AUTO W/O SCOPE: CPT | Performed by: STUDENT IN AN ORGANIZED HEALTH CARE EDUCATION/TRAINING PROGRAM

## 2023-04-24 PROCEDURE — G8417 CALC BMI ABV UP PARAM F/U: HCPCS | Performed by: STUDENT IN AN ORGANIZED HEALTH CARE EDUCATION/TRAINING PROGRAM

## 2023-04-24 RX ORDER — DRONABINOL 5 MG/1
5 CAPSULE ORAL 2 TIMES DAILY
COMMUNITY

## 2023-04-24 RX ORDER — MORPHINE SULFATE 30 MG/1
TABLET ORAL
COMMUNITY
Start: 2022-12-14 | End: 2023-04-24

## 2023-04-24 RX ORDER — PANTOPRAZOLE SODIUM 20 MG/1
TABLET, DELAYED RELEASE ORAL
COMMUNITY
Start: 2022-07-01

## 2023-04-24 RX ORDER — SODIUM BICARBONATE 84 MG/ML
INJECTION, SOLUTION INTRAVENOUS
COMMUNITY

## 2023-04-24 RX ORDER — CIPROFLOXACIN 500 MG/1
500 TABLET ORAL 2 TIMES DAILY
Qty: 14 TABLET | Refills: 0 | Status: SHIPPED | OUTPATIENT
Start: 2023-04-24 | End: 2023-05-01

## 2023-04-24 RX ORDER — GABAPENTIN 400 MG/1
400 CAPSULE ORAL 3 TIMES DAILY
COMMUNITY
Start: 2023-03-07

## 2023-04-24 RX ORDER — CIPROFLOXACIN 500 MG/1
500 TABLET ORAL 2 TIMES DAILY
Qty: 20 TABLET | Refills: 0 | Status: SHIPPED | OUTPATIENT
Start: 2023-04-24 | End: 2023-05-04

## 2023-04-24 RX ORDER — CLONAZEPAM 0.5 MG/1
TABLET ORAL
COMMUNITY

## 2023-04-24 RX ORDER — POTASSIUM CHLORIDE 750 MG/1
TABLET, FILM COATED, EXTENDED RELEASE ORAL
COMMUNITY
Start: 2023-03-18 | End: 2023-04-24

## 2023-04-24 RX ORDER — FLUCONAZOLE 150 MG/1
150 TABLET ORAL DAILY
Qty: 1 TABLET | Refills: 0 | Status: SHIPPED | OUTPATIENT
Start: 2023-04-24 | End: 2023-04-25

## 2023-04-24 RX ORDER — HUMAN INSULIN 100 [IU]/ML
INJECTION, SOLUTION SUBCUTANEOUS
COMMUNITY
Start: 2023-03-22

## 2023-04-24 RX ORDER — ADALIMUMAB 40MG/0.4ML
KIT SUBCUTANEOUS
COMMUNITY
Start: 2022-11-20
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
## 2023-04-24 NOTE — PROGRESS NOTES
Chief Complaint   Patient presents with    UTI     Visit Vitals  /78 (BP 1 Location: Left upper arm, BP Patient Position: Sitting)   Pulse 87   Temp 97.6 °F (36.4 °C) (Axillary)   Resp 16   Ht 5' 3\" (1.6 m)   Wt 222 lb (100.7 kg)   SpO2 98%   BMI 39.33 kg/m²     1. \"Have you been to the ER, urgent care clinic since your last visit? Hospitalized since your last visit? \" No    2. \"Have you seen or consulted any other health care providers outside of the [address] [place] [address] since your last visit? \" No     3. For patients aged 39-70: Has the patient had a colonoscopy / FIT/ Cologuard? NA - based on age      If the patient is female:    4. For patients aged 41-77: Has the patient had a mammogram within the past 2 years? NA - based on age or sex      11. For patients aged 21-65: Has the patient had a pap smear?  NA - based on age or sex

## 2023-04-26 LAB — BACTERIA UR CULT: NORMAL

## 2023-04-27 ENCOUNTER — TELEPHONE (OUTPATIENT)
Dept: FAMILY MEDICINE CLINIC | Age: 48
End: 2023-04-27

## 2023-04-27 DIAGNOSIS — K50.018 CROHN'S DISEASE OF SMALL INTESTINE WITH OTHER COMPLICATION (HCC): ICD-10-CM

## 2023-04-27 DIAGNOSIS — E66.01 MORBID OBESITY (HCC): Primary | Chronic | ICD-10-CM

## 2023-04-27 DIAGNOSIS — R26.89 DECREASED FUNCTIONAL MOBILITY: ICD-10-CM

## 2023-05-23 RX ORDER — LOPERAMIDE HYDROCHLORIDE 2 MG/1
CAPSULE ORAL
Qty: 90 CAPSULE | Refills: 1 | Status: SHIPPED | OUTPATIENT
Start: 2023-05-23

## 2023-07-26 NOTE — PROGRESS NOTES
Problem: Mobility Impaired (Adult and Pediatric)  Goal: *Acute Goals and Plan of Care (Insert Text)  Physical Therapy Goals  7/12/2017  1. Patient will move from supine to sit and sit to supine , scoot up and down and roll side to side in bed with independence within 7 day(s). 2. Patient will transfer from bed to chair and chair to bed with supervision/set-up using the least restrictive device within 7 day(s). 3. Patient will perform sit to stand with supervision/set-up within 7 day(s). 4. Patient will ambulate with supervision/set-up for 50 feet with the least restrictive device within 7 day(s). PHYSICAL THERAPY TREATMENT  Patient: Valarie Hinkle (46 y.o. female)  Date: 7/17/2017  Diagnosis: N/V intractable post-opt pain  Abdominal pain  poor iv access  DEAD BOWEL  SMALL BOWEL OBSTRUCTION  central line placement  Abdominal pain <principal problem not specified>  Procedure(s) (LRB):  SPECIAL PROCEDURE OUTSIDE OF OR (N/A) 4 Days Post-Op  Precautions:    Chart, physical therapy assessment, plan of care and goals were reviewed. ASSESSMENT:  Pt seemed in good spirits. Pt was able to mobilize with CGA to Min A + 1 for lines/leads. Pt was able to increase gait tolerance, but limited by abdominal drain to suctions. Unable to get clearance to have it disconnected from suction. Nurse was going to check. Pt tolerating sitting on the chair. Pt could use a BSC for toileting . Will continue to progress as able. Pt could benefit from OT consult  Progression toward goals:  [X]      Improving appropriately and progressing toward goals  [ ]      Improving slowly and progressing toward goals  [ ]      Not making progress toward goals and plan of care will be adjusted       PLAN:  Patient continues to benefit from skilled intervention to address the above impairments. Continue treatment per established plan of care.   Discharge Recommendations:  Rehab vs Home Health depending on progress   Further Equipment Recommendations for Discharge: TBD       SUBJECTIVE:   Patient stated I am doing alright.       OBJECTIVE DATA SUMMARY:   Critical Behavior:  Neurologic State: Drowsy  Orientation Level: Oriented X4  Cognition: Follows commands     Functional Mobility Training:  Bed Mobility:     Supine to Sit: Minimum assistance                          Transfers:  Sit to Stand: Contact guard assistance  Stand to Sit: Contact guard assistance                             Balance:  Sitting: Intact  Standing: Impaired  Standing - Static: Good  Standing - Dynamic : Fair  Ambulation/Gait Training:  Distance (ft): 18 Feet (ft)  Assistive Device: Walker, rolling  Ambulation - Level of Assistance: Contact guard assistance (+assist for lines)        Gait Abnormalities: Antalgic;Decreased step clearance        Base of Support: Widened     Speed/Liz: Slow  Step Length: Left shortened;Right shortened                    Stairs:              Neuro Re-Education:     Therapeutic Exercises:      Pain:  Pain Scale 1: Numeric (0 - 10)  Pain Intensity 1: 5  Pain Location 1: Abdomen  Pain Orientation 1: Anterior  Pain Description 1: Aching  Pain Intervention(s) 1: Encouraged PCA  Activity Tolerance:   Limited   Please refer to the flowsheet for vital signs taken during this treatment.   After treatment:   [X] Patient left in no apparent distress sitting up in chair  [ ] Patient left in no apparent distress in bed  [X] Call bell left within reach  [X] Nursing notified  [ ] Caregiver present  [ ] Bed alarm activated      COMMUNICATION/COLLABORATION:   The patients plan of care was discussed with: Registered Nurse     Farzana Osei PTA   Time Calculation: 20 mins Consent (Lip)/Introductory Paragraph: The rationale for Mohs was explained to the patient and consent was obtained. The risks, benefits and alternatives to therapy were discussed in detail. Specifically, the risks of lip deformity, changes in the oral aperture, infection, scarring, bleeding, prolonged wound healing, incomplete removal, allergy to anesthesia, nerve injury and recurrence were addressed. Prior to the procedure, the treatment site was clearly identified and confirmed by the patient using a hand mirror. All components of Universal Protocol/PAUSE Rule completed.

## 2023-09-03 NOTE — PROGRESS NOTES
TRANSFER - OUT REPORT:    Verbal report given to Anne Marie(name) on Alexis Stearns  being transferred to Pre-op Holding(unit) for ordered procedure       Report consisted of patients Situation, Background, Assessment and   Recommendations(SBAR). Information from the following report(s) SBAR, MAR and Recent Results was reviewed with the receiving nurse. Lines:   PICC Double Lumen 03/07/18 Right (Active)   Central Line Being Utilized Yes 4/12/2018 12:47 AM   Criteria for Appropriate Use Total parenteral nutrition 4/12/2018 12:47 AM   Site Assessment Clean, dry, & intact 4/12/2018 12:47 AM   Phlebitis Assessment 0 4/12/2018 12:47 AM   Infiltration Assessment 0 4/12/2018 12:47 AM   Date of Last Dressing Change 04/09/18 4/11/2018  8:51 PM   Dressing Status Clean, dry, & intact 4/11/2018  8:51 PM   Action Taken Open ports on tubing capped 4/11/2018  4:30 PM   External Catheter Length (cm) 4 centimeters 4/9/2018  3:57 PM   Dressing Type Disk with Chlorhexadine gluconate (CHG); Transparent 4/11/2018  4:30 PM   Hub Color/Line Status Purple;Flushed; Infusing 4/11/2018  8:51 PM   Positive Blood Return (Site #1) Yes 4/11/2018  8:51 PM   Hub Color/Line Status White;Flushed; Infusing 4/11/2018  8:51 PM   Positive Blood Return (Site #2) Yes 4/11/2018  8:51 PM   Alcohol Cap Used Yes 4/11/2018  8:51 PM        Opportunity for questions and clarification was provided. Parent

## 2023-10-17 ENCOUNTER — TELEMEDICINE (OUTPATIENT)
Facility: CLINIC | Age: 48
End: 2023-10-17
Payer: MEDICARE

## 2023-10-17 DIAGNOSIS — N89.8 VAGINAL ODOR: Primary | ICD-10-CM

## 2023-10-17 DIAGNOSIS — K50.919 CROHN'S DISEASE WITH COMPLICATION, UNSPECIFIED GASTROINTESTINAL TRACT LOCATION (HCC): ICD-10-CM

## 2023-10-17 PROBLEM — A09 INTESTINAL INFECTION: Status: RESOLVED | Noted: 2020-11-02 | Resolved: 2023-10-17

## 2023-10-17 PROBLEM — K63.2 SMALL BOWEL FISTULA: Status: RESOLVED | Noted: 2018-03-07 | Resolved: 2023-10-17

## 2023-10-17 PROCEDURE — 99214 OFFICE O/P EST MOD 30 MIN: CPT | Performed by: NURSE PRACTITIONER

## 2023-10-17 RX ORDER — ADALIMUMAB 40MG/0.4ML
KIT SUBCUTANEOUS
COMMUNITY
Start: 2023-09-25

## 2023-10-17 SDOH — ECONOMIC STABILITY: TRANSPORTATION INSECURITY
IN THE PAST 12 MONTHS, HAS LACK OF TRANSPORTATION KEPT YOU FROM MEETINGS, WORK, OR FROM GETTING THINGS NEEDED FOR DAILY LIVING?: NO

## 2023-10-17 SDOH — ECONOMIC STABILITY: FOOD INSECURITY: WITHIN THE PAST 12 MONTHS, THE FOOD YOU BOUGHT JUST DIDN'T LAST AND YOU DIDN'T HAVE MONEY TO GET MORE.: NEVER TRUE

## 2023-10-17 SDOH — ECONOMIC STABILITY: HOUSING INSECURITY
IN THE LAST 12 MONTHS, WAS THERE A TIME WHEN YOU DID NOT HAVE A STEADY PLACE TO SLEEP OR SLEPT IN A SHELTER (INCLUDING NOW)?: NO

## 2023-10-17 SDOH — ECONOMIC STABILITY: INCOME INSECURITY: HOW HARD IS IT FOR YOU TO PAY FOR THE VERY BASICS LIKE FOOD, HOUSING, MEDICAL CARE, AND HEATING?: SOMEWHAT HARD

## 2023-10-17 SDOH — ECONOMIC STABILITY: FOOD INSECURITY: WITHIN THE PAST 12 MONTHS, YOU WORRIED THAT YOUR FOOD WOULD RUN OUT BEFORE YOU GOT MONEY TO BUY MORE.: SOMETIMES TRUE

## 2023-10-17 NOTE — PROGRESS NOTES
Yazmin Ruiz, was evaluated through a synchronous (real-time) audio-video encounter. The patient (or guardian if applicable) is aware that this is a billable service, which includes applicable co-pays. This Virtual Visit was conducted with patient's (and/or legal guardian's) consent. Patient identification was verified, and a caregiver was present when appropriate. The patient was located at Home: [address] [place] [address]  Provider was located at Home ([address] [place] [address]): [address] [place] [address] (:  1975) is a Established patient, presenting virtually for evaluation of the following:    Assessment & Plan   Below is the assessment and plan developed based on review of pertinent history, physical exam, labs, studies, and medications. 1. Vaginal odor  -     NuSwab Vaginitis Plus (VG+) with Candida (Six Species)  NuSwab for additional clinical information and will make treatment decisions when I get the results  2. Crohn's disease with complication, unspecified gastrointestinal tract location [facility])  Patient to be adherent with her f/u with [facility] for her short bowel [facility] and [facility]  No follow-ups on file. Subjective   40 yo female presents with a complaint of being silver menopausal and recently having an unusual period 2 weeks ago which was heavy and had pain afterwards for about 10 days after which the pain spontaneously resolved. She has an upcoming appt with her OBGYN later this week. She is complaining now of vaginal odor. He history is notable for recent surgery at [facility] for her crohns and short bowel disease and she has a  [address] coming out to do her abdominal dressing changes. She has not found anything that makes it better or worse.     Review of Systems   Gastrointestinal:         Crohns and short bowel disease   Endocrine:        Diabetes   Genitourinary:         Vaginal odor   Hematological:         Anemia          Objective

## 2023-10-25 ENCOUNTER — OFFICE VISIT (OUTPATIENT)
Facility: CLINIC | Age: 48
End: 2023-10-25
Payer: MEDICARE

## 2023-10-25 VITALS
SYSTOLIC BLOOD PRESSURE: 124 MMHG | WEIGHT: 210 LBS | HEART RATE: 89 BPM | DIASTOLIC BLOOD PRESSURE: 78 MMHG | BODY MASS INDEX: 37.21 KG/M2 | RESPIRATION RATE: 16 BRPM | HEIGHT: 63 IN | TEMPERATURE: 97.2 F | OXYGEN SATURATION: 99 %

## 2023-10-25 DIAGNOSIS — E55.9 VITAMIN D DEFICIENCY: ICD-10-CM

## 2023-10-25 DIAGNOSIS — K50.919 CROHN'S DISEASE WITH COMPLICATION, UNSPECIFIED GASTROINTESTINAL TRACT LOCATION (HCC): ICD-10-CM

## 2023-10-25 DIAGNOSIS — Z95.828 PORT-A-CATH IN PLACE: ICD-10-CM

## 2023-10-25 DIAGNOSIS — E13.9 SECONDARY DIABETES (HCC): Primary | ICD-10-CM

## 2023-10-25 DIAGNOSIS — Z93.4 JEJUNOSTOMY PRESENT (HCC): ICD-10-CM

## 2023-10-25 DIAGNOSIS — G40.909 SEIZURE DISORDER (HCC): ICD-10-CM

## 2023-10-25 DIAGNOSIS — D64.9 CHRONIC ANEMIA: ICD-10-CM

## 2023-10-25 DIAGNOSIS — Z78.9 ON TOTAL PARENTERAL NUTRITION (TPN): ICD-10-CM

## 2023-10-25 PROBLEM — H92.09: Status: RESOLVED | Noted: 2022-11-09 | Resolved: 2023-10-25

## 2023-10-25 PROBLEM — N89.8 VAGINAL ODOR: Status: RESOLVED | Noted: 2023-10-17 | Resolved: 2023-10-25

## 2023-10-25 PROBLEM — R59.1 LYMPHADENOPATHY: Status: RESOLVED | Noted: 2022-11-09 | Resolved: 2023-10-25

## 2023-10-25 PROCEDURE — 99214 OFFICE O/P EST MOD 30 MIN: CPT | Performed by: NURSE PRACTITIONER

## 2023-10-25 RX ORDER — DRONABINOL 5 MG/1
CAPSULE ORAL
COMMUNITY
Start: 2017-01-01

## 2023-10-25 RX ORDER — METRONIDAZOLE 7.5 MG/G
GEL VAGINAL
COMMUNITY
Start: 2023-10-24

## 2023-10-31 PROBLEM — Z78.9 ON TOTAL PARENTERAL NUTRITION (TPN): Status: ACTIVE | Noted: 2023-10-31

## 2023-10-31 PROBLEM — Z00.00 ENCOUNTER FOR WELLNESS EXAMINATION IN ADULT: Status: ACTIVE | Noted: 2023-10-31

## 2023-10-31 PROBLEM — Z93.4 JEJUNOSTOMY PRESENT (HCC): Status: ACTIVE | Noted: 2023-10-31

## 2023-10-31 PROBLEM — Z95.828 PORT-A-CATH IN PLACE: Status: ACTIVE | Noted: 2023-10-31

## 2023-11-05 ENCOUNTER — HOSPITAL ENCOUNTER (INPATIENT)
Facility: HOSPITAL | Age: 48
LOS: 3 days | Discharge: HOME | DRG: 690 | End: 2023-11-08
Attending: STUDENT IN AN ORGANIZED HEALTH CARE EDUCATION/TRAINING PROGRAM | Admitting: INTERNAL MEDICINE
Payer: MEDICARE

## 2023-11-05 ENCOUNTER — APPOINTMENT (OUTPATIENT)
Facility: HOSPITAL | Age: 48
DRG: 690 | End: 2023-11-05
Payer: MEDICARE

## 2023-11-05 DIAGNOSIS — N10 ACUTE PYELONEPHRITIS: Primary | ICD-10-CM

## 2023-11-05 DIAGNOSIS — K50.919 CROHN'S DISEASE WITH COMPLICATION, UNSPECIFIED GASTROINTESTINAL TRACT LOCATION (HCC): ICD-10-CM

## 2023-11-05 PROBLEM — A41.9 SEPSIS (HCC): Status: ACTIVE | Noted: 2023-11-05

## 2023-11-05 LAB
ALBUMIN SERPL-MCNC: 3 G/DL (ref 3.5–5.2)
ALBUMIN/GLOB SERPL: 0.8 (ref 1.1–2.2)
ALP SERPL-CCNC: 196 U/L (ref 35–104)
ALT SERPL-CCNC: 49 U/L (ref 10–35)
ANION GAP SERPL CALC-SCNC: 9 MMOL/L (ref 5–15)
APPEARANCE UR: ABNORMAL
AST SERPL-CCNC: 21 U/L (ref 10–35)
BACTERIA URNS QL MICRO: ABNORMAL /HPF
BASOPHILS # BLD: 0 K/UL (ref 0–1)
BASOPHILS NFR BLD: 0 % (ref 0–1)
BILIRUB SERPL-MCNC: 1.8 MG/DL (ref 0.2–1)
BILIRUB UR QL CFM: POSITIVE
BUN SERPL-MCNC: 17 MG/DL (ref 6–20)
BUN/CREAT SERPL: 23 (ref 12–20)
CALCIUM SERPL-MCNC: 8.7 MG/DL (ref 8.6–10)
CHLORIDE SERPL-SCNC: 104 MMOL/L (ref 98–107)
CO2 SERPL-SCNC: 24 MMOL/L (ref 22–29)
COLOR UR: ABNORMAL
COMMENT:: NORMAL
CREAT SERPL-MCNC: 0.74 MG/DL (ref 0.5–0.9)
DIFFERENTIAL METHOD BLD: ABNORMAL
EOSINOPHIL # BLD: 0.1 K/UL (ref 0–0.4)
EOSINOPHIL NFR BLD: 0 %
EPITH CASTS URNS QL MICRO: ABNORMAL /LPF
ERYTHROCYTE [DISTWIDTH] IN BLOOD BY AUTOMATED COUNT: 15.2 % (ref 11.5–14.5)
GLOBULIN SER CALC-MCNC: 3.8 G/DL (ref 2–4)
GLUCOSE SERPL-MCNC: 119 MG/DL (ref 65–100)
GLUCOSE UR STRIP.AUTO-MCNC: NEGATIVE MG/DL
HCT VFR BLD AUTO: 36.3 % (ref 35–47)
HGB BLD-MCNC: 12.1 G/DL (ref 11.5–16)
HGB UR QL STRIP: ABNORMAL
IMM GRANULOCYTES # BLD AUTO: 0.1 K/UL (ref 0–0.04)
IMM GRANULOCYTES NFR BLD AUTO: 0 % (ref 0–0.5)
KETONES UR QL STRIP.AUTO: NEGATIVE MG/DL
LACTATE SERPL-SCNC: 0.9 MMOL/L (ref 0.4–2)
LEUKOCYTE ESTERASE UR QL STRIP.AUTO: ABNORMAL
LYMPHOCYTES # BLD: 1.8 K/UL (ref 0.8–3.5)
LYMPHOCYTES NFR BLD: 13 % (ref 12–49)
MCH RBC QN AUTO: 33.5 PG (ref 26–34)
MCHC RBC AUTO-ENTMCNC: 33.3 G/DL (ref 30–36.5)
MCV RBC AUTO: 100.6 FL (ref 80–99)
MONOCYTES # BLD: 1.1 K/UL (ref 0–1)
MONOCYTES NFR BLD: 8 % (ref 5–13)
NEUTS SEG # BLD: 10.4 K/UL (ref 1.8–8)
NEUTS SEG NFR BLD: 79 % (ref 32–75)
NITRITE UR QL STRIP.AUTO: POSITIVE
NRBC # BLD: 0 K/UL (ref 0–0.01)
NRBC BLD-RTO: 0 PER 100 WBC
PH UR STRIP: 6.5 (ref 5–8)
PLATELET # BLD AUTO: 129 K/UL (ref 150–400)
PMV BLD AUTO: 9.2 FL (ref 8.9–12.9)
POTASSIUM SERPL-SCNC: 4 MMOL/L (ref 3.5–5.1)
PROCALCITONIN SERPL-MCNC: 33.47 NG/ML
PROT SERPL-MCNC: 6.8 G/DL (ref 6.4–8.3)
PROT UR STRIP-MCNC: 100 MG/DL
RBC # BLD AUTO: 3.61 M/UL (ref 3.8–5.2)
RBC #/AREA URNS HPF: ABNORMAL /HPF
SODIUM SERPL-SCNC: 137 MMOL/L (ref 136–145)
SP GR UR REFRACTOMETRY: 1.02 (ref 1–1.03)
SPECIMEN HOLD: NORMAL
URINE CULTURE IF INDICATED: ABNORMAL
UROBILINOGEN UR QL STRIP.AUTO: 0.2 EU/DL (ref 0.2–1)
WBC # BLD AUTO: 13.3 K/UL (ref 3.6–11)
WBC URNS QL MICRO: >100 /HPF (ref 0–4)

## 2023-11-05 PROCEDURE — 36415 COLL VENOUS BLD VENIPUNCTURE: CPT

## 2023-11-05 PROCEDURE — 87077 CULTURE AEROBIC IDENTIFY: CPT

## 2023-11-05 PROCEDURE — 80053 COMPREHEN METABOLIC PANEL: CPT

## 2023-11-05 PROCEDURE — 87154 CUL TYP ID BLD PTHGN 6+ TRGT: CPT

## 2023-11-05 PROCEDURE — 2580000003 HC RX 258: Performed by: INTERNAL MEDICINE

## 2023-11-05 PROCEDURE — 87040 BLOOD CULTURE FOR BACTERIA: CPT

## 2023-11-05 PROCEDURE — 6360000002 HC RX W HCPCS: Performed by: PHYSICIAN ASSISTANT

## 2023-11-05 PROCEDURE — 6360000002 HC RX W HCPCS: Performed by: INTERNAL MEDICINE

## 2023-11-05 PROCEDURE — 99285 EMERGENCY DEPT VISIT HI MDM: CPT

## 2023-11-05 PROCEDURE — 6370000000 HC RX 637 (ALT 250 FOR IP): Performed by: INTERNAL MEDICINE

## 2023-11-05 PROCEDURE — 85025 COMPLETE CBC W/AUTO DIFF WBC: CPT

## 2023-11-05 PROCEDURE — 6370000000 HC RX 637 (ALT 250 FOR IP): Performed by: PHYSICIAN ASSISTANT

## 2023-11-05 PROCEDURE — 1100000000 HC RM PRIVATE

## 2023-11-05 PROCEDURE — 74176 CT ABD & PELVIS W/O CONTRAST: CPT

## 2023-11-05 PROCEDURE — 96374 THER/PROPH/DIAG INJ IV PUSH: CPT

## 2023-11-05 PROCEDURE — 84145 PROCALCITONIN (PCT): CPT

## 2023-11-05 PROCEDURE — 83605 ASSAY OF LACTIC ACID: CPT

## 2023-11-05 PROCEDURE — 93005 ELECTROCARDIOGRAM TRACING: CPT | Performed by: PHYSICIAN ASSISTANT

## 2023-11-05 PROCEDURE — 87086 URINE CULTURE/COLONY COUNT: CPT

## 2023-11-05 PROCEDURE — 81001 URINALYSIS AUTO W/SCOPE: CPT

## 2023-11-05 PROCEDURE — 2580000003 HC RX 258: Performed by: PHYSICIAN ASSISTANT

## 2023-11-05 RX ORDER — POTASSIUM CHLORIDE 750 MG/1
40 TABLET, FILM COATED, EXTENDED RELEASE ORAL PRN
Status: DISCONTINUED | OUTPATIENT
Start: 2023-11-05 | End: 2023-11-08 | Stop reason: HOSPADM

## 2023-11-05 RX ORDER — SODIUM CHLORIDE 0.9 % (FLUSH) 0.9 %
5-40 SYRINGE (ML) INJECTION EVERY 12 HOURS SCHEDULED
Status: DISCONTINUED | OUTPATIENT
Start: 2023-11-05 | End: 2023-11-08 | Stop reason: HOSPADM

## 2023-11-05 RX ORDER — ACETAMINOPHEN 325 MG/1
650 TABLET ORAL EVERY 6 HOURS PRN
Status: DISCONTINUED | OUTPATIENT
Start: 2023-11-05 | End: 2023-11-08 | Stop reason: HOSPADM

## 2023-11-05 RX ORDER — 0.9 % SODIUM CHLORIDE 0.9 %
10 INTRAVENOUS SOLUTION INTRAVENOUS ONCE
Status: COMPLETED | OUTPATIENT
Start: 2023-11-05 | End: 2023-11-05

## 2023-11-05 RX ORDER — ACETAMINOPHEN 650 MG/1
650 SUPPOSITORY RECTAL EVERY 6 HOURS PRN
Status: DISCONTINUED | OUTPATIENT
Start: 2023-11-05 | End: 2023-11-08 | Stop reason: HOSPADM

## 2023-11-05 RX ORDER — SODIUM CHLORIDE 9 MG/ML
INJECTION, SOLUTION INTRAVENOUS PRN
Status: DISCONTINUED | OUTPATIENT
Start: 2023-11-05 | End: 2023-11-08 | Stop reason: HOSPADM

## 2023-11-05 RX ORDER — ACETAMINOPHEN 500 MG
1000 TABLET ORAL
Status: COMPLETED | OUTPATIENT
Start: 2023-11-05 | End: 2023-11-05

## 2023-11-05 RX ORDER — POLYETHYLENE GLYCOL 3350 17 G/17G
17 POWDER, FOR SOLUTION ORAL DAILY PRN
Status: DISCONTINUED | OUTPATIENT
Start: 2023-11-05 | End: 2023-11-08 | Stop reason: HOSPADM

## 2023-11-05 RX ORDER — DRONABINOL 2.5 MG/1
10 CAPSULE ORAL DAILY
Status: DISCONTINUED | OUTPATIENT
Start: 2023-11-06 | End: 2023-11-08 | Stop reason: HOSPADM

## 2023-11-05 RX ORDER — MAGNESIUM SULFATE IN WATER 40 MG/ML
2000 INJECTION, SOLUTION INTRAVENOUS PRN
Status: DISCONTINUED | OUTPATIENT
Start: 2023-11-05 | End: 2023-11-08 | Stop reason: HOSPADM

## 2023-11-05 RX ORDER — ONDANSETRON 2 MG/ML
4 INJECTION INTRAMUSCULAR; INTRAVENOUS EVERY 6 HOURS PRN
Status: DISCONTINUED | OUTPATIENT
Start: 2023-11-05 | End: 2023-11-08 | Stop reason: HOSPADM

## 2023-11-05 RX ORDER — ONDANSETRON 2 MG/ML
4 INJECTION INTRAMUSCULAR; INTRAVENOUS ONCE
Status: COMPLETED | OUTPATIENT
Start: 2023-11-05 | End: 2023-11-05

## 2023-11-05 RX ORDER — ONDANSETRON 4 MG/1
4 TABLET, ORALLY DISINTEGRATING ORAL EVERY 8 HOURS PRN
Status: DISCONTINUED | OUTPATIENT
Start: 2023-11-05 | End: 2023-11-08 | Stop reason: HOSPADM

## 2023-11-05 RX ORDER — MORPHINE SULFATE 15 MG/1
60 TABLET ORAL EVERY 4 HOURS PRN
Status: DISCONTINUED | OUTPATIENT
Start: 2023-11-05 | End: 2023-11-05 | Stop reason: SDUPTHER

## 2023-11-05 RX ORDER — GABAPENTIN 800 MG/1
800 TABLET ORAL 3 TIMES DAILY
Status: DISCONTINUED | OUTPATIENT
Start: 2023-11-05 | End: 2023-11-05

## 2023-11-05 RX ORDER — MORPHINE SULFATE 15 MG/1
30 TABLET ORAL EVERY 8 HOURS PRN
Status: DISCONTINUED | OUTPATIENT
Start: 2023-11-05 | End: 2023-11-08 | Stop reason: HOSPADM

## 2023-11-05 RX ORDER — POTASSIUM CHLORIDE 7.45 MG/ML
10 INJECTION INTRAVENOUS PRN
Status: DISCONTINUED | OUTPATIENT
Start: 2023-11-05 | End: 2023-11-08 | Stop reason: HOSPADM

## 2023-11-05 RX ORDER — NALOXONE HYDROCHLORIDE 0.4 MG/ML
0.4 INJECTION, SOLUTION INTRAMUSCULAR; INTRAVENOUS; SUBCUTANEOUS PRN
Status: DISCONTINUED | OUTPATIENT
Start: 2023-11-05 | End: 2023-11-08 | Stop reason: HOSPADM

## 2023-11-05 RX ORDER — MORPHINE SULFATE 30 MG/1
60 TABLET, FILM COATED, EXTENDED RELEASE ORAL EVERY 8 HOURS
Status: DISCONTINUED | OUTPATIENT
Start: 2023-11-05 | End: 2023-11-08 | Stop reason: HOSPADM

## 2023-11-05 RX ORDER — SODIUM CHLORIDE 0.9 % (FLUSH) 0.9 %
5-40 SYRINGE (ML) INJECTION PRN
Status: DISCONTINUED | OUTPATIENT
Start: 2023-11-05 | End: 2023-11-08 | Stop reason: HOSPADM

## 2023-11-05 RX ORDER — 0.9 % SODIUM CHLORIDE 0.9 %
1000 INTRAVENOUS SOLUTION INTRAVENOUS ONCE
Status: COMPLETED | OUTPATIENT
Start: 2023-11-05 | End: 2023-11-06

## 2023-11-05 RX ADMIN — MORPHINE SULFATE 60 MG: 30 TABLET, FILM COATED, EXTENDED RELEASE ORAL at 22:44

## 2023-11-05 RX ADMIN — CEFTRIAXONE 1000 MG: 1 INJECTION, POWDER, FOR SOLUTION INTRAMUSCULAR; INTRAVENOUS at 19:17

## 2023-11-05 RX ADMIN — ONDANSETRON 4 MG: 2 INJECTION INTRAMUSCULAR; INTRAVENOUS at 19:34

## 2023-11-05 RX ADMIN — SODIUM CHLORIDE, PRESERVATIVE FREE 10 ML: 5 INJECTION INTRAVENOUS at 23:28

## 2023-11-05 RX ADMIN — PIPERACILLIN AND TAZOBACTAM 4500 MG: 4; .5 INJECTION, POWDER, LYOPHILIZED, FOR SOLUTION INTRAVENOUS at 23:19

## 2023-11-05 RX ADMIN — ACETAMINOPHEN 1000 MG: 500 TABLET ORAL at 19:17

## 2023-11-05 RX ADMIN — SODIUM CHLORIDE 1000 ML: 9 INJECTION, SOLUTION INTRAVENOUS at 19:16

## 2023-11-05 ASSESSMENT — ENCOUNTER SYMPTOMS
ABDOMINAL PAIN: 1
COUGH: 0
RHINORRHEA: 0
SHORTNESS OF BREATH: 0
VOMITING: 0
NAUSEA: 0
DIARRHEA: 0
SORE THROAT: 0

## 2023-11-05 ASSESSMENT — PAIN SCALES - GENERAL
PAINLEVEL_OUTOF10: 6
PAINLEVEL_OUTOF10: 8

## 2023-11-05 ASSESSMENT — PAIN DESCRIPTION - ORIENTATION
ORIENTATION: MID;LEFT
ORIENTATION: MID;LEFT

## 2023-11-05 ASSESSMENT — PAIN DESCRIPTION - LOCATION
LOCATION: BACK
LOCATION: BACK

## 2023-11-05 ASSESSMENT — PAIN - FUNCTIONAL ASSESSMENT: PAIN_FUNCTIONAL_ASSESSMENT: 0-10

## 2023-11-05 ASSESSMENT — PAIN DESCRIPTION - PAIN TYPE: TYPE: ACUTE PAIN

## 2023-11-05 NOTE — ED TRIAGE NOTES
Pt comes in via wheelchair in no signs of acute distress. Pt states that she has been having urinary frequency and urgency off and on for 3 weeks. Pt reports dark/cloudy urine. Pt saw her doctor and was put on Flagyl for 7 days. Pt reports having given herself \"0.45 fluid about a third of a bag. \" Via her central line.

## 2023-11-05 NOTE — ED PROVIDER NOTES
[facility] EMERGENCY DEPT  EMERGENCY DEPARTMENT ENCOUNTER      Pt Name: Gareth Paredes  MRN: [id number]  [facility] 1975  Date of evaluation: 11/5/2023  Provider: Darinel Matias PA-C    CHIEF COMPLAINT       Chief Complaint   Patient presents with    Urinary Frequency    Flank Pain         HISTORY OF PRESENT ILLNESS   (Location/Symptom, Timing/Onset, Context/Setting, Quality, Duration, Modifying Factors, Severity)  Note limiting factors. Pt is a 40 yo F with PMHx significant for crohn's disease, jejunostomy, presents to the ED for intermittent urinary frequency and a cloudy colored urine for three weeks. Reports chills, left sided flank pain and mild suprapubic pain since yesterday. Reports being prescribed a 7 day course of Flagyl by her Ob/GYN for malodorous discharge a couple of weeks ago. Pt states that Dr. Jossie Garcia manages her humira. Notes having a port-a-cath for which she receives TPN. Denies fever, nausea, vomiting, diarrhea, hematuria, dysuria, chest pain, shortness of breath. Review of External Medical Records:     Nursing Notes were reviewed. REVIEW OF SYSTEMS    (2-9 systems for level 4, 10 or more for level 5)     Review of Systems   Constitutional:  Positive for chills. Negative for fever. HENT:  Negative for congestion, rhinorrhea and sore throat. Respiratory:  Negative for cough and shortness of breath. Cardiovascular:  Negative for chest pain. Gastrointestinal:  Positive for abdominal pain. Negative for diarrhea, nausea and vomiting. Genitourinary:  Positive for frequency. Negative for dysuria and hematuria. Musculoskeletal:  Negative for myalgias. Neurological:  Negative for dizziness, weakness and headaches. Except as noted above the remainder of the review of systems was reviewed and negative.        PAST MEDICAL HISTORY     Past Medical History:   Diagnosis Date    Abdominal pain 6/25/2017    Follows with Pain doctor    Adverse effect of anesthesia

## 2023-11-06 PROBLEM — E66.01 MORBID OBESITY (HCC): Status: ACTIVE | Noted: 2017-10-27

## 2023-11-06 PROBLEM — N28.89 RIGHT RENAL MASS: Status: ACTIVE | Noted: 2020-11-02

## 2023-11-06 PROBLEM — D69.6 THROMBOCYTOPENIA (HCC): Status: ACTIVE | Noted: 2023-11-06

## 2023-11-06 PROBLEM — E86.0 DEHYDRATION: Status: ACTIVE | Noted: 2023-11-06

## 2023-11-06 PROBLEM — Z00.00 ENCOUNTER FOR WELLNESS EXAMINATION IN ADULT: Status: RESOLVED | Noted: 2023-10-31 | Resolved: 2023-11-06

## 2023-11-06 PROBLEM — D72.829 LEUKOCYTOSIS: Status: ACTIVE | Noted: 2023-11-06

## 2023-11-06 PROBLEM — E78.00 PURE HYPERCHOLESTEROLEMIA: Status: ACTIVE | Noted: 2020-11-04

## 2023-11-06 PROBLEM — D64.9 CHRONIC ANEMIA: Status: ACTIVE | Noted: 2018-06-12

## 2023-11-06 PROBLEM — G40.909 SEIZURE DISORDER (HCC): Status: ACTIVE | Noted: 2022-11-09

## 2023-11-06 PROBLEM — R00.0 SINUS TACHYCARDIA: Status: ACTIVE | Noted: 2023-11-06

## 2023-11-06 PROBLEM — I95.9 HYPOTENSION: Status: ACTIVE | Noted: 2023-11-06

## 2023-11-06 PROBLEM — E13.9 SECONDARY DIABETES (HCC): Status: ACTIVE | Noted: 2020-11-23

## 2023-11-06 PROBLEM — E55.9 VITAMIN D DEFICIENCY: Status: RESOLVED | Noted: 2020-11-02 | Resolved: 2023-11-06

## 2023-11-06 PROBLEM — R73.03 PREDIABETES: Status: ACTIVE | Noted: 2021-12-18

## 2023-11-06 PROBLEM — N39.0 UTI (URINARY TRACT INFECTION): Status: ACTIVE | Noted: 2023-11-06

## 2023-11-06 LAB
EKG ATRIAL RATE: 93 BPM
EKG DIAGNOSIS: NORMAL
EKG P AXIS: 65 DEGREES
EKG P-R INTERVAL: 140 MS
EKG Q-T INTERVAL: 338 MS
EKG QRS DURATION: 72 MS
EKG QTC CALCULATION (BAZETT): 420 MS
EKG R AXIS: 70 DEGREES
EKG T AXIS: 72 DEGREES
EKG VENTRICULAR RATE: 93 BPM
GLUCOSE BLD STRIP.AUTO-MCNC: 125 MG/DL (ref 65–117)
GLUCOSE BLD STRIP.AUTO-MCNC: 131 MG/DL (ref 65–117)
GLUCOSE BLD STRIP.AUTO-MCNC: 54 MG/DL (ref 65–117)
GLUCOSE BLD STRIP.AUTO-MCNC: 56 MG/DL (ref 65–117)
GLUCOSE BLD STRIP.AUTO-MCNC: 62 MG/DL (ref 65–117)
GLUCOSE BLD STRIP.AUTO-MCNC: 72 MG/DL (ref 65–117)
GLUCOSE BLD STRIP.AUTO-MCNC: 77 MG/DL (ref 65–117)
SERVICE CMNT-IMP: ABNORMAL
SERVICE CMNT-IMP: NORMAL
SERVICE CMNT-IMP: NORMAL

## 2023-11-06 PROCEDURE — 1100000000 HC RM PRIVATE

## 2023-11-06 PROCEDURE — 6360000002 HC RX W HCPCS: Performed by: INTERNAL MEDICINE

## 2023-11-06 PROCEDURE — 6370000000 HC RX 637 (ALT 250 FOR IP): Performed by: INTERNAL MEDICINE

## 2023-11-06 PROCEDURE — 82962 GLUCOSE BLOOD TEST: CPT

## 2023-11-06 PROCEDURE — 6370000000 HC RX 637 (ALT 250 FOR IP): Performed by: HOSPITALIST

## 2023-11-06 PROCEDURE — 2580000003 HC RX 258: Performed by: HOSPITALIST

## 2023-11-06 PROCEDURE — 2580000003 HC RX 258: Performed by: INTERNAL MEDICINE

## 2023-11-06 PROCEDURE — 2500000003 HC RX 250 WO HCPCS: Performed by: INTERNAL MEDICINE

## 2023-11-06 PROCEDURE — 94761 N-INVAS EAR/PLS OXIMETRY MLT: CPT

## 2023-11-06 PROCEDURE — 93010 ELECTROCARDIOGRAM REPORT: CPT | Performed by: INTERNAL MEDICINE

## 2023-11-06 RX ORDER — DEXTROSE MONOHYDRATE 100 MG/ML
INJECTION, SOLUTION INTRAVENOUS CONTINUOUS PRN
Status: DISCONTINUED | OUTPATIENT
Start: 2023-11-06 | End: 2023-11-08 | Stop reason: HOSPADM

## 2023-11-06 RX ORDER — DEXTROSE MONOHYDRATE, SODIUM CHLORIDE, AND POTASSIUM CHLORIDE 50; 1.49; 9 G/1000ML; G/1000ML; G/1000ML
INJECTION, SOLUTION INTRAVENOUS CONTINUOUS
Status: DISCONTINUED | OUTPATIENT
Start: 2023-11-06 | End: 2023-11-08 | Stop reason: HOSPADM

## 2023-11-06 RX ADMIN — PIPERACILLIN AND TAZOBACTAM 3375 MG: 3; .375 INJECTION, POWDER, LYOPHILIZED, FOR SOLUTION INTRAVENOUS at 13:32

## 2023-11-06 RX ADMIN — ACETAMINOPHEN 650 MG: 325 TABLET ORAL at 01:40

## 2023-11-06 RX ADMIN — MORPHINE SULFATE 60 MG: 30 TABLET, FILM COATED, EXTENDED RELEASE ORAL at 14:44

## 2023-11-06 RX ADMIN — Medication 16 G: at 01:09

## 2023-11-06 RX ADMIN — PIPERACILLIN AND TAZOBACTAM 3375 MG: 3; .375 INJECTION, POWDER, LYOPHILIZED, FOR SOLUTION INTRAVENOUS at 04:45

## 2023-11-06 RX ADMIN — HYDROCORTISONE SODIUM SUCCINATE 100 MG: 100 INJECTION, POWDER, FOR SOLUTION INTRAMUSCULAR; INTRAVENOUS at 13:32

## 2023-11-06 RX ADMIN — DRONABINOL 10 MG: 2.5 CAPSULE ORAL at 10:39

## 2023-11-06 RX ADMIN — HYDROCORTISONE SODIUM SUCCINATE 100 MG: 100 INJECTION, POWDER, FOR SOLUTION INTRAMUSCULAR; INTRAVENOUS at 22:23

## 2023-11-06 RX ADMIN — MORPHINE SULFATE 30 MG: 15 TABLET ORAL at 10:33

## 2023-11-06 RX ADMIN — POTASSIUM CHLORIDE, DEXTROSE MONOHYDRATE AND SODIUM CHLORIDE: 150; 5; 900 INJECTION, SOLUTION INTRAVENOUS at 10:38

## 2023-11-06 RX ADMIN — DEXTROSE MONOHYDRATE 125 ML: 100 INJECTION, SOLUTION INTRAVENOUS at 01:38

## 2023-11-06 RX ADMIN — PIPERACILLIN AND TAZOBACTAM 3375 MG: 3; .375 INJECTION, POWDER, LYOPHILIZED, FOR SOLUTION INTRAVENOUS at 22:23

## 2023-11-06 RX ADMIN — ACETAMINOPHEN 650 MG: 325 TABLET ORAL at 10:34

## 2023-11-06 RX ADMIN — SODIUM CHLORIDE 1000 ML: 9 INJECTION, SOLUTION INTRAVENOUS at 00:04

## 2023-11-06 RX ADMIN — MORPHINE SULFATE 60 MG: 30 TABLET, FILM COATED, EXTENDED RELEASE ORAL at 22:23

## 2023-11-06 RX ADMIN — SODIUM CHLORIDE, PRESERVATIVE FREE 10 ML: 5 INJECTION INTRAVENOUS at 10:41

## 2023-11-06 RX ADMIN — MORPHINE SULFATE 60 MG: 30 TABLET, FILM COATED, EXTENDED RELEASE ORAL at 06:29

## 2023-11-06 RX ADMIN — ACETAMINOPHEN 650 MG: 325 TABLET ORAL at 17:45

## 2023-11-06 ASSESSMENT — PAIN SCALES - GENERAL
PAINLEVEL_OUTOF10: 4
PAINLEVEL_OUTOF10: 0
PAINLEVEL_OUTOF10: 4
PAINLEVEL_OUTOF10: 6
PAINLEVEL_OUTOF10: 8
PAINLEVEL_OUTOF10: 1
PAINLEVEL_OUTOF10: 8
PAINLEVEL_OUTOF10: 7
PAINLEVEL_OUTOF10: 9
PAINLEVEL_OUTOF10: 9

## 2023-11-06 ASSESSMENT — PAIN DESCRIPTION - ONSET: ONSET: PROGRESSIVE

## 2023-11-06 ASSESSMENT — PAIN DESCRIPTION - LOCATION
LOCATION: ABDOMEN
LOCATION: ABDOMEN;BACK

## 2023-11-06 ASSESSMENT — PAIN DESCRIPTION - DESCRIPTORS
DESCRIPTORS: CRAMPING
DESCRIPTORS: CRAMPING
DESCRIPTORS: SORE
DESCRIPTORS: CRAMPING;SHOOTING

## 2023-11-06 ASSESSMENT — PAIN DESCRIPTION - ORIENTATION
ORIENTATION: ANTERIOR
ORIENTATION: LEFT;UPPER

## 2023-11-06 NOTE — CARE COORDINATION
11/6/23  2:49 PM    Care Management Initial Evaluation:       11/06/23 1436   Service Assessment   Patient Orientation Alert and Oriented   Cognition Alert   History Provided By Patient   Primary Caregiver Private caregiver   Support Systems Family Members;Friends/Neighbors; Other (Comment)  (caregiver)   [address] is: Named in [address]    PCP Verified by CM Yes   Last Visit to PCP Within last 3 months   Prior Functional Level Assistance with the following:;Bathing;Housework;Cooking;Mobility; Shopping   Current Functional Level Assistance with the following:;Bathing;Cooking;Housework; Shopping;Mobility   Can patient return to prior living arrangement Yes   Ability to make needs known: Good   Family able to assist with home care needs: Yes   Would you like for me to discuss the discharge plan with any other family members/significant others, and if so, who? Yes   Financial Resources Medicare;Medicaid   Social/Functional History   Lives With Alone   Type of [address] [place] [address] Two level; Able to Live on Main level with bedroom/bathroom   Home Access Ramped entrance   Bathroom Shower/Tub   (shower bench)   Bathroom Equipment Grab bars in [address], rolling; Wheelchair-manual;Lift chair;Cane   Receives Help From Personal care attendant   ADL Assistance Needs assistance   [facility] Needs assistance   Ambulation Assistance Needs assistance   Transfer Assistance Needs assistance   Active  No   Patient's  Info Caregiver   Mode of Transportation Car   Discharge Planning   Type of [address] Medications No   Patient expects to be discharged to: [place] Discharge   Transition of Care Consult (CM Consult) Theresa No   Reason Outside Agency Chosen Patient already serviced by other home care/hospice agency Services At/After Discharge Home Health;Nursing services   [employer] Information Provided? No   Mode of Transport at Discharge Other (see comment)  (Caregiver/friend-Kasey)   Confirm Follow Up Transport Friends     CM reviewed EMR and met with patient to complete the initial evaluation. Confirmed charted demographics. Patient lives alone and has a caregiver, Mateo Guzman who is with her 5 days a week. Her caregiver assists her with bathing, washing, grocery shopping, dr. Bhavana Peng, cleaning. Patient attends [facility]. She had been bedbound then was able to get into a wheelchair and now she is more mobile, able to get  into a chair and uses her lift chair to get into standing position. She has TPN which is managed by Y[facility] Valle and has a nurse from [facility] who completes blood work and manages the TPN site. Patient went to [facility] in the past following her surgery- she is hopeful to be able to return home with caregiver assist.     Patients caregiver, Mateo Guzman will transport her home at [place].      Pharmacy: [facility]    [facility] following for dc needs    Sherry Chaudhary

## 2023-11-06 NOTE — WOUND CARE
New patient consult: consulted for abdominal wound POA. Janice Durham is known to me from past hospitalizations at [facility]. She is in ED resting on stretcher at time of visit. Assessment:  Midline abdominal wound - 0.4 x 0.4 x 2.2 cm, moist red, drainage moderate serosanguinous to rust colored. No odor, no surrounding drainage, chronic non-healing surgical wound. LLQ jejunostomy currently pouched. No skin breakdown under wafer per patient. Treatment:  Packed midline abdominal wound with strip of saline moistened Hydrofera Blue, covered with 2x2 then Optifoam to secure. Recommendations/Plan:  Pack midline abdominal wound with strip of saline moistened Hydrofera Blue leaving tail to remove, cover with 2x2 then Optifoam to secure. Will drop off ostomy supplies for Lizette. Hand off to Dr. Yael Lacy.   Isabela Ortiz RN, [employer]
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
## 2023-11-06 NOTE — ED NOTES
I have personally received a critical lab result on this patient. Lab result was for positive culture and the result was gram positive rods. Chart has been reviewed and result has been discussed with attending physician.  Given patient is admitted the following actions will be taken: attending MD made aware through RN.     SERGIO Shipman  6:00 PM  11/06/23       Miguelina Shipman  11/06/23 0633

## 2023-11-06 NOTE — ED NOTES
TRANSFER - OUT REPORT:    Verbal report given to Rudolph Servin on Gunnar Christy  being transferred to [facility] ED for routine progression of patient care       Report consisted of patient's Situation, Background, Assessment and   Recommendations(SBAR). Information from the following report(s) Nurse Handoff Report was reviewed with the receiving nurse. Kinder Fall Assessment:                           Lines:   CVC Double Lumen Right Subclavian (Active)        Opportunity for questions and clarification was provided.       Patient transported with:  Hospital to  [facility] [place]  11/05/23 1260

## 2023-11-06 NOTE — PROGRESS NOTES
22:15- Confirmed with Dr. Sage Menon CVC purple port can be use for Medications and blood draw. White port only to use for TPN purposes.

## 2023-11-07 LAB
ACCESSION NUMBER, LLC1M: ABNORMAL
ACINETOBACTER CALCOAC BAUMANNII COMPLEX BY PCR: NOT DETECTED
ALBUMIN SERPL-MCNC: 2.3 G/DL (ref 3.5–5)
ALBUMIN/GLOB SERPL: 0.5 (ref 1.1–2.2)
ALP SERPL-CCNC: 172 U/L (ref 45–117)
ALT SERPL-CCNC: 37 U/L (ref 12–78)
ANION GAP SERPL CALC-SCNC: 4 MMOL/L (ref 5–15)
AST SERPL-CCNC: 8 U/L (ref 15–37)
B FRAGILIS DNA BLD POS QL NAA+NON-PROBE: NOT DETECTED
BACTERIA SPEC CULT: ABNORMAL
BILIRUB SERPL-MCNC: 1.2 MG/DL (ref 0.2–1)
BIOFIRE TEST COMMENT: ABNORMAL
BUN SERPL-MCNC: 20 MG/DL (ref 6–20)
BUN/CREAT SERPL: 22 (ref 12–20)
C ALBICANS DNA BLD POS QL NAA+NON-PROBE: NOT DETECTED
C AURIS DNA BLD POS QL NAA+NON-PROBE: NOT DETECTED
C GATTII+NEOFOR DNA BLD POS QL NAA+N-PRB: NOT DETECTED
C GLABRATA DNA BLD POS QL NAA+NON-PROBE: NOT DETECTED
C KRUSEI DNA BLD POS QL NAA+NON-PROBE: NOT DETECTED
C PARAP DNA BLD POS QL NAA+NON-PROBE: NOT DETECTED
C TROPICLS DNA BLD POS QL NAA+NON-PROBE: NOT DETECTED
CALCIUM SERPL-MCNC: 8.6 MG/DL (ref 8.5–10.1)
CC UR VC: ABNORMAL
CHLORIDE SERPL-SCNC: 108 MMOL/L (ref 97–108)
CO2 SERPL-SCNC: 24 MMOL/L (ref 21–32)
COMMENT:: NORMAL
CREAT SERPL-MCNC: 0.92 MG/DL (ref 0.55–1.02)
E CLOAC COMP DNA BLD POS NAA+NON-PROBE: NOT DETECTED
E COLI DNA BLD POS QL NAA+NON-PROBE: NOT DETECTED
E FAECALIS DNA BLD POS QL NAA+NON-PROBE: NOT DETECTED
E FAECIUM DNA BLD POS QL NAA+NON-PROBE: NOT DETECTED
ENTEROBACTERALES DNA BLD POS NAA+N-PRB: NOT DETECTED
ERYTHROCYTE [DISTWIDTH] IN BLOOD BY AUTOMATED COUNT: 14.8 % (ref 11.5–14.5)
GLOBULIN SER CALC-MCNC: 4.6 G/DL (ref 2–4)
GLUCOSE BLD STRIP.AUTO-MCNC: 200 MG/DL (ref 65–117)
GLUCOSE SERPL-MCNC: 168 MG/DL (ref 65–100)
GP B STREP DNA BLD POS QL NAA+NON-PROBE: NOT DETECTED
HAEM INFLU DNA BLD POS QL NAA+NON-PROBE: NOT DETECTED
HCT VFR BLD AUTO: 34.2 % (ref 35–47)
HGB BLD-MCNC: 11.3 G/DL (ref 11.5–16)
K OXYTOCA DNA BLD POS QL NAA+NON-PROBE: NOT DETECTED
KLEBSIELLA SP DNA BLD POS QL NAA+NON-PRB: NOT DETECTED
KLEBSIELLA SP DNA BLD POS QL NAA+NON-PRB: NOT DETECTED
L MONOCYTOG DNA BLD POS QL NAA+NON-PROBE: NOT DETECTED
MAGNESIUM SERPL-MCNC: 1.6 MG/DL (ref 1.6–2.4)
MCH RBC QN AUTO: 33.3 PG (ref 26–34)
MCHC RBC AUTO-ENTMCNC: 33 G/DL (ref 30–36.5)
MCV RBC AUTO: 100.9 FL (ref 80–99)
MECA+MECC ISLT/SPM QL: DETECTED
N MEN DNA BLD POS QL NAA+NON-PROBE: NOT DETECTED
NRBC # BLD: 0 K/UL (ref 0–0.01)
NRBC BLD-RTO: 0 PER 100 WBC
P AERUGINOSA DNA BLD POS NAA+NON-PROBE: NOT DETECTED
PHOSPHATE SERPL-MCNC: 2.3 MG/DL (ref 2.6–4.7)
PLATELET # BLD AUTO: 133 K/UL (ref 150–400)
PMV BLD AUTO: 10 FL (ref 8.9–12.9)
POTASSIUM SERPL-SCNC: 4.1 MMOL/L (ref 3.5–5.1)
PROT SERPL-MCNC: 6.9 G/DL (ref 6.4–8.2)
PROTEUS SP DNA BLD POS QL NAA+NON-PROBE: NOT DETECTED
RBC # BLD AUTO: 3.39 M/UL (ref 3.8–5.2)
RESISTANT GENE TARGETS: ABNORMAL
S AUREUS DNA BLD POS QL NAA+NON-PROBE: NOT DETECTED
S AUREUS+CONS DNA BLD POS NAA+NON-PROBE: DETECTED
S EPIDERMIDIS DNA BLD POS QL NAA+NON-PRB: DETECTED
S LUGDUNENSIS DNA BLD POS QL NAA+NON-PRB: NOT DETECTED
S MALTOPHILIA DNA BLD POS QL NAA+NON-PRB: NOT DETECTED
S MARCESCENS DNA BLD POS NAA+NON-PROBE: NOT DETECTED
S PNEUM DNA BLD POS QL NAA+NON-PROBE: NOT DETECTED
S PYO DNA BLD POS QL NAA+NON-PROBE: NOT DETECTED
SALMONELLA DNA BLD POS QL NAA+NON-PROBE: NOT DETECTED
SERVICE CMNT-IMP: ABNORMAL
SERVICE CMNT-IMP: ABNORMAL
SODIUM SERPL-SCNC: 136 MMOL/L (ref 136–145)
SPECIMEN HOLD: NORMAL
STREPTOCOCCUS DNA BLD POS NAA+NON-PROBE: NOT DETECTED
TRIGL SERPL-MCNC: 106 MG/DL
WBC # BLD AUTO: 7.6 K/UL (ref 3.6–11)

## 2023-11-07 PROCEDURE — 84100 ASSAY OF PHOSPHORUS: CPT

## 2023-11-07 PROCEDURE — 1100000000 HC RM PRIVATE

## 2023-11-07 PROCEDURE — 6370000000 HC RX 637 (ALT 250 FOR IP): Performed by: INTERNAL MEDICINE

## 2023-11-07 PROCEDURE — 6360000002 HC RX W HCPCS: Performed by: INTERNAL MEDICINE

## 2023-11-07 PROCEDURE — 2580000003 HC RX 258: Performed by: INTERNAL MEDICINE

## 2023-11-07 PROCEDURE — 94761 N-INVAS EAR/PLS OXIMETRY MLT: CPT

## 2023-11-07 PROCEDURE — 87040 BLOOD CULTURE FOR BACTERIA: CPT

## 2023-11-07 PROCEDURE — 36415 COLL VENOUS BLD VENIPUNCTURE: CPT

## 2023-11-07 PROCEDURE — 82962 GLUCOSE BLOOD TEST: CPT

## 2023-11-07 PROCEDURE — 97161 PT EVAL LOW COMPLEX 20 MIN: CPT

## 2023-11-07 PROCEDURE — 84478 ASSAY OF TRIGLYCERIDES: CPT

## 2023-11-07 PROCEDURE — 97116 GAIT TRAINING THERAPY: CPT

## 2023-11-07 PROCEDURE — 80053 COMPREHEN METABOLIC PANEL: CPT

## 2023-11-07 PROCEDURE — 85027 COMPLETE CBC AUTOMATED: CPT

## 2023-11-07 PROCEDURE — 83735 ASSAY OF MAGNESIUM: CPT

## 2023-11-07 PROCEDURE — 2500000003 HC RX 250 WO HCPCS: Performed by: INTERNAL MEDICINE

## 2023-11-07 RX ADMIN — HYDROCORTISONE SODIUM SUCCINATE 100 MG: 100 INJECTION, POWDER, FOR SOLUTION INTRAMUSCULAR; INTRAVENOUS at 04:20

## 2023-11-07 RX ADMIN — DRONABINOL 10 MG: 2.5 CAPSULE ORAL at 08:53

## 2023-11-07 RX ADMIN — HYDROCORTISONE SODIUM SUCCINATE 100 MG: 100 INJECTION, POWDER, FOR SOLUTION INTRAMUSCULAR; INTRAVENOUS at 12:29

## 2023-11-07 RX ADMIN — POTASSIUM PHOSPHATE, MONOBASIC POTASSIUM PHOSPHATE, DIBASIC 15 MMOL: 224; 236 INJECTION, SOLUTION, CONCENTRATE INTRAVENOUS at 12:24

## 2023-11-07 RX ADMIN — MORPHINE SULFATE 60 MG: 30 TABLET, FILM COATED, EXTENDED RELEASE ORAL at 13:48

## 2023-11-07 RX ADMIN — I.V. FAT EMULSION 250 ML: 20 EMULSION INTRAVENOUS at 18:44

## 2023-11-07 RX ADMIN — SODIUM CHLORIDE, PRESERVATIVE FREE 10 ML: 5 INJECTION INTRAVENOUS at 08:53

## 2023-11-07 RX ADMIN — MORPHINE SULFATE 60 MG: 30 TABLET, FILM COATED, EXTENDED RELEASE ORAL at 06:37

## 2023-11-07 RX ADMIN — MORPHINE SULFATE 60 MG: 30 TABLET, FILM COATED, EXTENDED RELEASE ORAL at 21:03

## 2023-11-07 RX ADMIN — PIPERACILLIN AND TAZOBACTAM 3375 MG: 3; .375 INJECTION, POWDER, LYOPHILIZED, FOR SOLUTION INTRAVENOUS at 21:03

## 2023-11-07 RX ADMIN — ACETAMINOPHEN 650 MG: 325 TABLET ORAL at 10:33

## 2023-11-07 RX ADMIN — PIPERACILLIN AND TAZOBACTAM 3375 MG: 3; .375 INJECTION, POWDER, LYOPHILIZED, FOR SOLUTION INTRAVENOUS at 12:29

## 2023-11-07 RX ADMIN — PIPERACILLIN AND TAZOBACTAM 3375 MG: 3; .375 INJECTION, POWDER, LYOPHILIZED, FOR SOLUTION INTRAVENOUS at 04:20

## 2023-11-07 RX ADMIN — ASCORBIC ACID, VITAMIN A PALMITATE, CHOLECALCIFEROL, THIAMINE HYDROCHLORIDE, RIBOFLAVIN-5 PHOSPHATE SODIUM, PYRIDOXINE HYDROCHLORIDE, NIACINAMIDE, DEXPANTHENOL, ALPHA-TOCOPHEROL ACETATE, VITAMIN K1, FOLIC ACID, BIOTIN, CYANOCOBALAMIN: 200; 3300; 200; 6; 3.6; 6; 40; 15; 10; 150; 600; 60; 5 INJECTION, SOLUTION INTRAVENOUS at 18:44

## 2023-11-07 RX ADMIN — HYDROCORTISONE SODIUM SUCCINATE 100 MG: 100 INJECTION, POWDER, FOR SOLUTION INTRAMUSCULAR; INTRAVENOUS at 21:02

## 2023-11-07 RX ADMIN — SODIUM CHLORIDE, PRESERVATIVE FREE 10 ML: 5 INJECTION INTRAVENOUS at 21:05

## 2023-11-07 RX ADMIN — ACETAMINOPHEN 650 MG: 325 TABLET ORAL at 18:51

## 2023-11-07 ASSESSMENT — PAIN DESCRIPTION - DESCRIPTORS
DESCRIPTORS: SHARP
DESCRIPTORS: ACHING
DESCRIPTORS: ACHING

## 2023-11-07 ASSESSMENT — PAIN SCALES - GENERAL
PAINLEVEL_OUTOF10: 7
PAINLEVEL_OUTOF10: 3
PAINLEVEL_OUTOF10: 3

## 2023-11-07 ASSESSMENT — PAIN DESCRIPTION - LOCATION
LOCATION: BACK
LOCATION: BACK
LOCATION: ABDOMEN;BACK

## 2023-11-07 ASSESSMENT — PAIN DESCRIPTION - ORIENTATION
ORIENTATION: LOWER
ORIENTATION: LOWER

## 2023-11-07 NOTE — PROGRESS NOTES
11/7/2023 12:21 PM   Care Management Progress Note      ICD-10-CM    1. Acute pyelonephritis  N10           RUR:  12%  Risk Level: [x]Low []Moderate []High    Transition of care plan:  Ongoing medical management  Home with caregiver at discharge  PT and OT to eval for discharge needs, pt was receiving outpatient PT/[facility] to [address] on home TPN through [address] follow-up.   Pt's caregiver to transport at discharge

## 2023-11-07 NOTE — PROGRESS NOTES
Spiritual Care Partner Volunteer visited patient at [address] [place] [address] in [address] [place] [address] on 11/7/2023     Documented by:  Katty Hale MDiv  Staff   Paging Service [phone] (ANDRES)

## 2023-11-07 NOTE — PROGRESS NOTES
Nutrition Note    Recommendations: Add insulin to TPN or sliding scale coverage   Provide Sam BID to aid in wound healing (95 kcal, 9.8 g carbs, complete amino acids for wound healing 2.5 g)  Liberalize diet order to Regular  TPN as ordered 1L/d & increase to 1500 mL tomorrow  Replace Low Phos (orders noted)    Brief:  RD attended & discussed patient during interdisciplinary rounds on unit. RD visited with patient. She reports BG rises with TPN & home formula has been adding 23 units for 1500 mL. She uses 3 in 1 TPN at home 2-3 times per week. She has non-healing abd wound for years. She does not recall trying Judye Feil in the past, but accepts liquids well and is willing to try for wound healing. She has lost significant amount of weight over the last 1-2 years, leaving significant loose skin but good muscle tone and she is able to move well in her bed to reposition and bath with wipes. She knows foods she is able to tolerate and usually eats solid foods once per day.      Electronically signed by Darryle Hockey, MS, SOSA on 11/7/23 at 11:03 AM EST  Contact via [employer] or office [phone]

## 2023-11-07 NOTE — PLAN OF CARE
Problem: Nutrition Deficit:  Goal: Optimize nutritional status  Outcome: Progressing     Problem: Discharge Planning  Goal: Discharge to home or other facility with appropriate resources  Outcome: Progressing     Problem: Safety - Adult  Goal: Free from fall injury  Outcome: Progressing     Problem: Pain  Goal: Verbalizes/displays adequate comfort level or baseline comfort level  Outcome: Progressing

## 2023-11-08 VITALS
RESPIRATION RATE: 16 BRPM | HEIGHT: 62 IN | DIASTOLIC BLOOD PRESSURE: 73 MMHG | WEIGHT: 216.49 LBS | HEART RATE: 55 BPM | OXYGEN SATURATION: 100 % | SYSTOLIC BLOOD PRESSURE: 118 MMHG | TEMPERATURE: 98.1 F | BODY MASS INDEX: 39.84 KG/M2

## 2023-11-08 LAB
ALBUMIN SERPL-MCNC: 2.2 G/DL (ref 3.5–5)
ALBUMIN/GLOB SERPL: 0.4 (ref 1.1–2.2)
ALP SERPL-CCNC: 160 U/L (ref 45–117)
ALT SERPL-CCNC: 35 U/L (ref 12–78)
ANION GAP SERPL CALC-SCNC: 4 MMOL/L (ref 5–15)
AST SERPL-CCNC: ABNORMAL U/L (ref 15–37)
BACTERIA SPEC CULT: ABNORMAL
BILIRUB SERPL-MCNC: 1.1 MG/DL (ref 0.2–1)
BUN SERPL-MCNC: 28 MG/DL (ref 6–20)
BUN/CREAT SERPL: 27 (ref 12–20)
CALCIUM SERPL-MCNC: 8.3 MG/DL (ref 8.5–10.1)
CHLORIDE SERPL-SCNC: 106 MMOL/L (ref 97–108)
CO2 SERPL-SCNC: 25 MMOL/L (ref 21–32)
CREAT SERPL-MCNC: 1.03 MG/DL (ref 0.55–1.02)
ERYTHROCYTE [DISTWIDTH] IN BLOOD BY AUTOMATED COUNT: 14.7 % (ref 11.5–14.5)
GLOBULIN SER CALC-MCNC: 4.9 G/DL (ref 2–4)
GLUCOSE BLD STRIP.AUTO-MCNC: 150 MG/DL (ref 65–117)
GLUCOSE BLD STRIP.AUTO-MCNC: 163 MG/DL (ref 65–117)
GLUCOSE SERPL-MCNC: 283 MG/DL (ref 65–100)
HCT VFR BLD AUTO: 34.3 % (ref 35–47)
HGB BLD-MCNC: 11.8 G/DL (ref 11.5–16)
MAGNESIUM SERPL-MCNC: NORMAL MG/DL (ref 1.6–2.4)
MCH RBC QN AUTO: 34.8 PG (ref 26–34)
MCHC RBC AUTO-ENTMCNC: 34.4 G/DL (ref 30–36.5)
MCV RBC AUTO: 101.2 FL (ref 80–99)
NRBC # BLD: 0 K/UL (ref 0–0.01)
NRBC BLD-RTO: 0 PER 100 WBC
PHOSPHATE SERPL-MCNC: 3.2 MG/DL (ref 2.6–4.7)
PLATELET # BLD AUTO: 165 K/UL (ref 150–400)
PMV BLD AUTO: 10.1 FL (ref 8.9–12.9)
POTASSIUM SERPL-SCNC: ABNORMAL MMOL/L (ref 3.5–5.1)
PROT SERPL-MCNC: 7.1 G/DL (ref 6.4–8.2)
RBC # BLD AUTO: 3.39 M/UL (ref 3.8–5.2)
SERVICE CMNT-IMP: ABNORMAL
SODIUM SERPL-SCNC: 135 MMOL/L (ref 136–145)
WBC # BLD AUTO: 12.2 K/UL (ref 3.6–11)

## 2023-11-08 PROCEDURE — 82962 GLUCOSE BLOOD TEST: CPT

## 2023-11-08 PROCEDURE — 6360000002 HC RX W HCPCS: Performed by: INTERNAL MEDICINE

## 2023-11-08 PROCEDURE — 6370000000 HC RX 637 (ALT 250 FOR IP): Performed by: INTERNAL MEDICINE

## 2023-11-08 PROCEDURE — 6370000000 HC RX 637 (ALT 250 FOR IP)

## 2023-11-08 PROCEDURE — 80053 COMPREHEN METABOLIC PANEL: CPT

## 2023-11-08 PROCEDURE — 85027 COMPLETE CBC AUTOMATED: CPT

## 2023-11-08 PROCEDURE — 84100 ASSAY OF PHOSPHORUS: CPT

## 2023-11-08 PROCEDURE — 83735 ASSAY OF MAGNESIUM: CPT

## 2023-11-08 PROCEDURE — 2580000003 HC RX 258: Performed by: INTERNAL MEDICINE

## 2023-11-08 PROCEDURE — 36415 COLL VENOUS BLD VENIPUNCTURE: CPT

## 2023-11-08 RX ORDER — CYCLOBENZAPRINE HCL 10 MG
10 TABLET ORAL ONCE
Status: COMPLETED | OUTPATIENT
Start: 2023-11-08 | End: 2023-11-08

## 2023-11-08 RX ORDER — LEVOFLOXACIN 500 MG/1
500 TABLET, FILM COATED ORAL DAILY
Qty: 3 TABLET | Refills: 0 | Status: SHIPPED | OUTPATIENT
Start: 2023-11-08 | End: 2023-11-10 | Stop reason: SDUPTHER

## 2023-11-08 RX ADMIN — HYDROCORTISONE SODIUM SUCCINATE 100 MG: 100 INJECTION, POWDER, FOR SOLUTION INTRAMUSCULAR; INTRAVENOUS at 04:48

## 2023-11-08 RX ADMIN — PIPERACILLIN AND TAZOBACTAM 3375 MG: 3; .375 INJECTION, POWDER, LYOPHILIZED, FOR SOLUTION INTRAVENOUS at 04:48

## 2023-11-08 RX ADMIN — HYDROCORTISONE SODIUM SUCCINATE 100 MG: 100 INJECTION, POWDER, FOR SOLUTION INTRAMUSCULAR; INTRAVENOUS at 12:15

## 2023-11-08 RX ADMIN — ACETAMINOPHEN 650 MG: 325 TABLET ORAL at 04:57

## 2023-11-08 RX ADMIN — SODIUM CHLORIDE, PRESERVATIVE FREE 10 ML: 5 INJECTION INTRAVENOUS at 08:42

## 2023-11-08 RX ADMIN — DRONABINOL 10 MG: 2.5 CAPSULE ORAL at 08:42

## 2023-11-08 RX ADMIN — MORPHINE SULFATE 60 MG: 30 TABLET, FILM COATED, EXTENDED RELEASE ORAL at 06:14

## 2023-11-08 RX ADMIN — CYCLOBENZAPRINE 10 MG: 10 TABLET, FILM COATED ORAL at 06:13

## 2023-11-08 RX ADMIN — MORPHINE SULFATE 30 MG: 15 TABLET ORAL at 01:02

## 2023-11-08 RX ADMIN — MORPHINE SULFATE 30 MG: 15 TABLET ORAL at 12:14

## 2023-11-08 ASSESSMENT — PAIN SCALES - GENERAL
PAINLEVEL_OUTOF10: 4
PAINLEVEL_OUTOF10: 7
PAINLEVEL_OUTOF10: 7
PAINLEVEL_OUTOF10: 4
PAINLEVEL_OUTOF10: 7

## 2023-11-08 ASSESSMENT — PAIN DESCRIPTION - DESCRIPTORS
DESCRIPTORS: ACHING;STABBING
DESCRIPTORS: STABBING;BURNING;CRAMPING
DESCRIPTORS: ACHING;STABBING

## 2023-11-08 ASSESSMENT — PAIN DESCRIPTION - LOCATION
LOCATION: ABDOMEN;BACK
LOCATION: BACK;ABDOMEN
LOCATION: BACK;ABDOMEN

## 2023-11-08 NOTE — PROGRESS NOTES
Occupational Therapy  Order received and chart reviewed, attempted to see however patient decline need at this time. Up ad shweta in room. Discussed with PT and patient to continue outpatient PT. Has adaptive equipment for ADL's at home. Issued additional pillows for increased comfort in bed. Will sign off.     Pierce Gong, OTR/L

## 2023-11-08 NOTE — DISCHARGE INSTRUCTIONS
Patient Discharge Instructions    Corinne Keating / [id number] : 1975    Admitted 2023 Discharged: 2023     Primary Diagnoses  @Rprob@    Take Home Medications     It is important that you take the medication exactly as they are prescribed. Keep your medication in the bottles provided by the pharmacist and keep a list of the medication names, dosages, and times to be taken in your wallet. Do not take other medications without consulting your doctor. What to do at Home    Recommended diet: regular diet    Recommended activity: activity as tolerated    If you experience worse symptoms, please follow up with GI. Follow-up with your PCP in a few weeks    [unfilled]     Information obtained by :  I understand that if any problems occur once I am at home I am to contact my physician. I understand and acknowledge receipt of the instructions indicated above.                                                                                                                                            Physician's or R.N.'s Signature                                                                  Date/Time                                                                                                                                              Patient or Representative Signature                                                          Date/Time

## 2023-11-08 NOTE — PLAN OF CARE
Monitored and Maintained or Improved:   Monitor and assess patient's chronic conditions and comorbid symptoms for stability, deterioration, or improvement   Collaborate with multidisciplinary team to address chronic and comorbid conditions and prevent exacerbation or deterioration   Update acute care plan with appropriate goals if chronic or comorbid symptoms are exacerbated and prevent overall improvement and discharge  11/8/2023 0551 by Katie Mcarthur RN  Outcome: Progressing

## 2023-11-08 NOTE — PROGRESS NOTES
Physician Progress Note      Annemarie Adams  [facility] #:                  [id number]  :                       1975  ADMIT DATE:       2023 4:19 PM  DISCH DATE:  RESPONDING  PROVIDER #: Марина Chu MD        QUERY TEXT:    Type of Anemia: Please provide further specificity, if known. Clinical indicators include: chronic anemia, iron, hgb, hct  Options provided:  -- Anemia due to acute blood loss  -- Anemia due to chronic blood loss  -- Anemia due to iron deficiency  -- Anemia due to postoperative blood loss  -- Anemia due to chronic disease  -- Other - I will add my own diagnosis  -- Disagree - Not applicable / Not valid  -- Disagree - Clinically Unable to determine / Unknown        PROVIDER RESPONSE TEXT:    The patient has anemia due to chronic blood loss. QUERY TEXT:    Good afternoon  Patient admitted with Sepsis. Noted to have Moderate Malnutrition in Nutrition consult note, . If possible, please document in progress notes and discharge summary if you   are evaluating and /or treating any of the following: The medical record reflects the following:  Risk Factors: Chronic TPN, Crohn's disease, junostomy present (HCC)/abdominal   wound: Consult wound care    Clinical Indicators:  H&P,  \"Crohn's disease: On Humira. Jejunostomy   present (HCC)/abdominal wound: Consult wound care. Chronic TPN: Consult   nutrition products while inpatient\"    Dietitian consult note,  \"Malnutrition Status: Moderate malnutrition  Energy Intake:  75% or less of estimated energy requirements for 7 or more   days  Weight Loss:  No significant weight loss  Body Fat Loss:  No significant body fat loss  Muscle Mass Loss:  No significant muscle mass loss  Fluid Accumulation:  Mild Extremities\".     Treatment: Dietitian consult, TPN    Thank you  Ryne Garcia RN [facility]  [phone]    ASPEN Criteria:

## 2023-11-08 NOTE — CARE COORDINATION
11/8/2023 2:41 PMCM met with pt prior to discharge, provided outpatient PT and OT scripts for her to take to her next appt with [facility]. CM also confirmed with pt she has her TPN bags at home. CM called and spoke with [facility] who reported they do not need any information from CM, they will continue pt's care. No further CM needs identified. Pt discharged home with her caregiver. Wilmar Monge, [facility]       11/08/23 4268   Discharge Planning   Type of Residence House   Living Arrangements Alone  (Caregiver to assist)   Current Services Prior To Admission Home Infusion; Other (Comment)  (Outpatient PT and OT through [address] [place] [address])   Potential Assistance Needed Outpatient PT/OT   DME Ordered? No   Potential Assistance Purchasing Medications No   Type of Home Care Services IV Therapy  (home TPN through [facility])   Patient expects to be discharged to: [facility]   Transition of Care Consult (CM Consult) Discharge Planning   Services At/After Discharge PT;OT;IV Therapy   [facility] Information Provided?  No   Mode of Transport at Discharge   (Pt's Caregiver)

## 2023-11-08 NOTE — PROGRESS NOTES
Patient is comfortable; she was a long-term patient of Dr. Tremayne Abarca.   We will pass her care on to members of his team

## 2023-11-08 NOTE — PLAN OF CARE
Problem: Nutrition Deficit:  Goal: Optimize nutritional status  11/8/2023 1059 by Patsy Ramsay LPN  Outcome: Progressing  11/8/2023 0551 by Paul Abarca RN  Outcome: Progressing     Problem: Discharge Planning  Goal: Discharge to home or other facility with appropriate resources  11/8/2023 1059 by Patsy Ramsay LPN  Outcome: Progressing  Flowsheets (Taken 11/8/2023 0750)  Discharge to home or other facility with appropriate resources:   Identify barriers to discharge with patient and caregiver   Arrange for needed discharge resources and transportation as appropriate   Identify discharge learning needs (meds, wound care, etc)   Refer to discharge planning if patient needs post-hospital services based on physician order or complex needs related to functional status, cognitive ability or social support system  11/8/2023 0551 by Paul Abarca RN  Outcome: Progressing     Problem: Safety - Adult  Goal: Free from fall injury  11/8/2023 1059 by Patsy Ramsay LPN  Outcome: Progressing  11/8/2023 0551 by Paul Abarca RN  Outcome: Progressing     Problem: Pain  Goal: Verbalizes/displays adequate comfort level or baseline comfort level  11/8/2023 1059 by Patsy Ramsay LPN  Outcome: Progressing  11/8/2023 0551 by Paul Abarca RN  Outcome: Progressing     Problem: Chronic Conditions and Co-morbidities  Goal: Patient's chronic conditions and co-morbidity symptoms are monitored and maintained or improved  11/8/2023 1059 by Patsy Ramsay LPN  Outcome: Progressing  Flowsheets (Taken 11/8/2023 0750)  Care Plan - Patient's Chronic Conditions and Co-Morbidity Symptoms are Monitored and Maintained or Improved:   Monitor and assess patient's chronic conditions and comorbid symptoms for stability, deterioration, or improvement   Collaborate with multidisciplinary team to address chronic and comorbid conditions and prevent exacerbation or deterioration   Update acute care plan with appropriate goals if chronic or comorbid symptoms are exacerbated and prevent overall improvement and discharge  11/8/2023 0551 by Yusuf Hernandez RN  Outcome: Progressing

## 2023-11-08 NOTE — PLAN OF CARE
Problem: Nutrition Deficit:  Goal: Optimize nutritional status  Outcome: Progressing     Problem: Discharge Planning  Goal: Discharge to home or other facility with appropriate resources  Outcome: Progressing     Problem: Safety - Adult  Goal: Free from fall injury  Outcome: Progressing     Problem: Pain  Goal: Verbalizes/displays adequate comfort level or baseline comfort level  Outcome: Progressing     Problem: Chronic Conditions and Co-morbidities  Goal: Patient's chronic conditions and co-morbidity symptoms are monitored and maintained or improved  Outcome: Progressing

## 2023-11-10 ENCOUNTER — TELEMEDICINE (OUTPATIENT)
Facility: CLINIC | Age: 48
End: 2023-11-10

## 2023-11-10 DIAGNOSIS — Z09 HOSPITAL DISCHARGE FOLLOW-UP: Primary | ICD-10-CM

## 2023-11-10 DIAGNOSIS — N39.0 URINARY TRACT INFECTION WITHOUT HEMATURIA, SITE UNSPECIFIED: ICD-10-CM

## 2023-11-10 DIAGNOSIS — Z87.448 HISTORY OF PYELONEPHRITIS: ICD-10-CM

## 2023-11-10 DIAGNOSIS — Z86.19 HISTORY OF SEPSIS: ICD-10-CM

## 2023-11-10 PROBLEM — I95.9 HYPOTENSION: Status: RESOLVED | Noted: 2023-11-06 | Resolved: 2023-11-10

## 2023-11-10 PROBLEM — R00.0 SINUS TACHYCARDIA: Status: RESOLVED | Noted: 2023-11-06 | Resolved: 2023-11-10

## 2023-11-10 PROBLEM — A41.9 SEPSIS (HCC): Status: RESOLVED | Noted: 2023-11-05 | Resolved: 2023-11-10

## 2023-11-10 LAB
BACTERIA SPEC CULT: NORMAL
SERVICE CMNT-IMP: NORMAL

## 2023-11-10 RX ORDER — LEVOFLOXACIN 500 MG/1
500 TABLET, FILM COATED ORAL DAILY
Qty: 3 TABLET | Refills: 0 | Status: SHIPPED | OUTPATIENT
Start: 2023-11-10 | End: 2023-11-13

## 2023-11-10 NOTE — PROGRESS NOTES
Rickey Grayson, was evaluated through a synchronous (real-time) audio-video encounter. The patient (or guardian if applicable) is aware that this is a billable service, which includes applicable co-pays. This Virtual Visit was conducted with patient's (and/or legal guardian's) consent. Patient identification was verified, and a caregiver was present when appropriate. The patient was located at Home: [address] [place] [id number]  Provider was located at Home ([address] [place] [address]): [id number] [place] [address] (:  1975) is a Established patient, presenting virtually for evaluation of the following:    Assessment & Plan   Below is the assessment and plan developed based on review of pertinent history, physical exam, labs, studies, and medications. 1. Hospital discharge follow-up  Patient was admitted for sepsis and pylonephritis and patient still feels that the symptoms continue and we will do a second 3 day course of antibiotics  2. History of pyelonephritis  Patient still has symptoms of a UTI and has been treatment for pylo. Additional 3 days of antibiotics for continued symptoms  3. History of sepsis  Patient still has symptoms of a UTI and has been treatment for pylo. Additional 3 days of antibiotics for continued symptoms  4. Urinary tract infection without hematuria, site unspecified  Additional 3 days of antibiotics and patient will let me know if this does not ameliorate her symptoms           Subjective   41year-old patient presents for hospital discharge follow-up. She was admitted to the hospital on 2023 for what was initially thought to be a UTI but was later found out to be acute pyelonephritis with sepsis, dehydration, sinus tachycardia, hypotension and dehydration. She was treated with Zosyn IV when she was an inpatient and sent home on her Levaquin.   She was also given fluids to correct the dehydration and with the correction came the resolution of the sinus tachycardia and

## 2023-11-14 ENCOUNTER — TELEPHONE (OUTPATIENT)
Facility: CLINIC | Age: 48
End: 2023-11-14

## 2023-11-14 NOTE — TELEPHONE ENCOUNTER
Patient called in regards to needing a refill for Levofloxacin 500 mg. She said per TSS that if she needed a refill and is still having issues to call and she will refill it.      Last appt: 11.10.23 with TSS VV  Next appt: 6.3.24 with TSS    [facility], [address] [place], [place] [phone]

## 2023-11-14 NOTE — PROGRESS NOTES
Physician Progress Note      Alysa Sanchez  [facility] #:                  [id number]  :                       1975  ADMIT DATE:       2023 4:19 PM  [facility] DATE:        2023 1:01 PM  RESPONDING  PROVIDER #: Leila Anderson MD          QUERY TEXT:    Good morning  Patient admitted with sepsis/ UTI. Noted documentation of sepsis in H&P on . In order to support the diagnosis of sepsis, please include additional   clinical indicators in your documentation. Or please document if the   diagnosis of sepsis has been ruled out after further study    The medical record reflects the following:  Risk Factors: ***  Clinical Indicators: presented with intermittent urinary frequency and a   cloudy colored urine. Reports chills, left sided flank pain and mild   suprapubic pain. Per documentation \"Meets criteria of sepsis with elevated heart rate and   leukocytosis suspected UTI/pyelonephritis. Lactic acid also within normal   limits. Progress notes stated as \" No longer septic. No current SIRS,   bacteremia likely contamination\". Discharge summary hospital course also   stated as \"no longer septic.  no current SIRS, bacteremia likely   contamination\". Negative blood cultures on repeat culture. \"  Admitted with sepsis due to suspected pyelonephritis/ UTI  Treatment: daily labs, UC/ BC, IV Rocephin, Zosyn    Thank you  Harjit Aguirre RN CDI  [phone]  Options provided:  -- Sepsis present as evidenced by, Please document evidence. -- Sepsis was ruled out after study  -- Other - I will add my own diagnosis  -- Disagree - Not applicable / Not valid  -- Disagree - Clinically unable to determine / Unknown  -- Refer to Clinical Documentation Reviewer    PROVIDER RESPONSE TEXT:    Sepsis was ruled out after study.     Query created by: Harjit Aguirre on 2023 9:46 AM      Electronically signed by:  Leila Anderson MD 2023 7:08 PM

## 2023-11-20 RX ORDER — LEVOFLOXACIN 500 MG/1
500 TABLET, FILM COATED ORAL DAILY
Qty: 7 TABLET | Refills: 0 | Status: SHIPPED | OUTPATIENT
Start: 2023-11-20 | End: 2023-11-21 | Stop reason: ALTCHOICE

## 2023-11-21 RX ORDER — PREDNISONE 20 MG/1
20 TABLET ORAL DAILY
Qty: 7 TABLET | Refills: 0 | Status: SHIPPED | OUTPATIENT
Start: 2023-11-21 | End: 2023-11-28

## 2023-11-21 RX ORDER — LEVOFLOXACIN 500 MG/1
500 TABLET, FILM COATED ORAL DAILY
Qty: 5 TABLET | Refills: 0 | Status: SHIPPED | OUTPATIENT
Start: 2023-11-21 | End: 2023-11-26

## 2023-11-21 NOTE — PROGRESS NOTES
Patient has vaginal US coming up and was recently discharged from hospital with pylonephritis and still symptomatic and now with brownish drainage from urethra

## 2023-12-06 PROBLEM — E86.0 DEHYDRATION: Status: RESOLVED | Noted: 2023-11-06 | Resolved: 2023-12-06

## 2023-12-10 PROBLEM — Z09 HOSPITAL DISCHARGE FOLLOW-UP: Status: RESOLVED | Noted: 2022-11-09 | Resolved: 2023-12-10

## 2023-12-25 ENCOUNTER — APPOINTMENT (OUTPATIENT)
Facility: HOSPITAL | Age: 48
DRG: 543 | End: 2023-12-25
Payer: MEDICARE

## 2023-12-25 ENCOUNTER — HOSPITAL ENCOUNTER (INPATIENT)
Facility: HOSPITAL | Age: 48
LOS: 8 days | Discharge: NURSING, REHAB OR LONG-TERM CARE | DRG: 543 | End: 2024-01-02
Attending: EMERGENCY MEDICINE | Admitting: HOSPITALIST
Payer: MEDICARE

## 2023-12-25 DIAGNOSIS — M84.48XG SACRAL INSUFFICIENCY FRACTURE WITH DELAYED HEALING: ICD-10-CM

## 2023-12-25 DIAGNOSIS — M84.48XA BILATERAL SACRAL INSUFFICIENCY FRACTURE, INITIAL ENCOUNTER: Primary | ICD-10-CM

## 2023-12-25 DIAGNOSIS — R52 INTRACTABLE PAIN: ICD-10-CM

## 2023-12-25 LAB
COMMENT:: NORMAL
SPECIMEN HOLD: NORMAL

## 2023-12-25 PROCEDURE — 6370000000 HC RX 637 (ALT 250 FOR IP)

## 2023-12-25 PROCEDURE — 99285 EMERGENCY DEPT VISIT HI MDM: CPT

## 2023-12-25 PROCEDURE — 2580000003 HC RX 258: Performed by: HOSPITALIST

## 2023-12-25 PROCEDURE — 6360000002 HC RX W HCPCS

## 2023-12-25 PROCEDURE — 72131 CT LUMBAR SPINE W/O DYE: CPT

## 2023-12-25 PROCEDURE — 99222 1ST HOSP IP/OBS MODERATE 55: CPT | Performed by: NURSE PRACTITIONER

## 2023-12-25 PROCEDURE — 96374 THER/PROPH/DIAG INJ IV PUSH: CPT

## 2023-12-25 PROCEDURE — 6360000002 HC RX W HCPCS: Performed by: HOSPITALIST

## 2023-12-25 PROCEDURE — 6370000000 HC RX 637 (ALT 250 FOR IP): Performed by: EMERGENCY MEDICINE

## 2023-12-25 PROCEDURE — 1100000000 HC RM PRIVATE

## 2023-12-25 PROCEDURE — 74176 CT ABD & PELVIS W/O CONTRAST: CPT

## 2023-12-25 PROCEDURE — 36415 COLL VENOUS BLD VENIPUNCTURE: CPT

## 2023-12-25 PROCEDURE — 6370000000 HC RX 637 (ALT 250 FOR IP): Performed by: HOSPITALIST

## 2023-12-25 RX ORDER — FUROSEMIDE 20 MG/1
20 TABLET ORAL DAILY
Status: DISCONTINUED | OUTPATIENT
Start: 2023-12-25 | End: 2024-01-02 | Stop reason: HOSPADM

## 2023-12-25 RX ORDER — SODIUM CHLORIDE 9 MG/ML
INJECTION, SOLUTION INTRAVENOUS PRN
Status: DISCONTINUED | OUTPATIENT
Start: 2023-12-25 | End: 2024-01-02 | Stop reason: HOSPADM

## 2023-12-25 RX ORDER — MORPHINE SULFATE 30 MG/1
60 TABLET, FILM COATED, EXTENDED RELEASE ORAL EVERY 8 HOURS
Status: DISCONTINUED | OUTPATIENT
Start: 2023-12-25 | End: 2023-12-29

## 2023-12-25 RX ORDER — SODIUM CHLORIDE 0.9 % (FLUSH) 0.9 %
5-40 SYRINGE (ML) INJECTION PRN
Status: DISCONTINUED | OUTPATIENT
Start: 2023-12-25 | End: 2024-01-02 | Stop reason: HOSPADM

## 2023-12-25 RX ORDER — ENOXAPARIN SODIUM 100 MG/ML
40 INJECTION SUBCUTANEOUS EVERY EVENING
Status: DISCONTINUED | OUTPATIENT
Start: 2023-12-25 | End: 2024-01-02 | Stop reason: HOSPADM

## 2023-12-25 RX ORDER — MORPHINE SULFATE 4 MG/ML
4 INJECTION, SOLUTION INTRAMUSCULAR; INTRAVENOUS
Status: COMPLETED | OUTPATIENT
Start: 2023-12-25 | End: 2023-12-25

## 2023-12-25 RX ORDER — VITAMIN B COMPLEX
1 CAPSULE ORAL DAILY
COMMUNITY

## 2023-12-25 RX ORDER — PANTOPRAZOLE SODIUM 40 MG/10ML
40 INJECTION, POWDER, LYOPHILIZED, FOR SOLUTION INTRAVENOUS DAILY
COMMUNITY

## 2023-12-25 RX ORDER — ONDANSETRON 2 MG/ML
4 INJECTION INTRAMUSCULAR; INTRAVENOUS EVERY 6 HOURS PRN
Status: DISCONTINUED | OUTPATIENT
Start: 2023-12-25 | End: 2024-01-02 | Stop reason: HOSPADM

## 2023-12-25 RX ORDER — POTASSIUM CHLORIDE 7.45 MG/ML
10 INJECTION INTRAVENOUS PRN
Status: DISCONTINUED | OUTPATIENT
Start: 2023-12-25 | End: 2024-01-02 | Stop reason: HOSPADM

## 2023-12-25 RX ORDER — LIDOCAINE 4 G/G
2 PATCH TOPICAL ONCE
Status: COMPLETED | OUTPATIENT
Start: 2023-12-25 | End: 2023-12-26

## 2023-12-25 RX ORDER — DRONABINOL 2.5 MG/1
5 CAPSULE ORAL DAILY
Status: DISCONTINUED | OUTPATIENT
Start: 2023-12-26 | End: 2024-01-02 | Stop reason: HOSPADM

## 2023-12-25 RX ORDER — ACETAMINOPHEN 500 MG
1000 TABLET ORAL
Status: COMPLETED | OUTPATIENT
Start: 2023-12-25 | End: 2023-12-25

## 2023-12-25 RX ORDER — MORPHINE SULFATE 4 MG/ML
4 INJECTION, SOLUTION INTRAMUSCULAR; INTRAVENOUS EVERY 4 HOURS PRN
Status: DISCONTINUED | OUTPATIENT
Start: 2023-12-25 | End: 2023-12-28

## 2023-12-25 RX ORDER — IBUPROFEN 800 MG/1
800 TABLET ORAL
Status: COMPLETED | OUTPATIENT
Start: 2023-12-25 | End: 2023-12-25

## 2023-12-25 RX ORDER — ONDANSETRON 4 MG/1
4 TABLET, ORALLY DISINTEGRATING ORAL EVERY 8 HOURS PRN
Status: DISCONTINUED | OUTPATIENT
Start: 2023-12-25 | End: 2024-01-02 | Stop reason: HOSPADM

## 2023-12-25 RX ORDER — POTASSIUM CHLORIDE 750 MG/1
40 TABLET, FILM COATED, EXTENDED RELEASE ORAL PRN
Status: DISCONTINUED | OUTPATIENT
Start: 2023-12-25 | End: 2024-01-02 | Stop reason: HOSPADM

## 2023-12-25 RX ORDER — KETOROLAC TROMETHAMINE 30 MG/ML
30 INJECTION, SOLUTION INTRAMUSCULAR; INTRAVENOUS ONCE
Status: DISCONTINUED | OUTPATIENT
Start: 2023-12-25 | End: 2023-12-25

## 2023-12-25 RX ORDER — ACETAMINOPHEN 650 MG/1
650 SUPPOSITORY RECTAL EVERY 6 HOURS PRN
Status: DISCONTINUED | OUTPATIENT
Start: 2023-12-25 | End: 2024-01-02 | Stop reason: HOSPADM

## 2023-12-25 RX ORDER — HYDROCODONE BITARTRATE AND ACETAMINOPHEN 5; 325 MG/1; MG/1
1 TABLET ORAL
Status: DISCONTINUED | OUTPATIENT
Start: 2023-12-25 | End: 2023-12-25

## 2023-12-25 RX ORDER — ACETAMINOPHEN 325 MG/1
650 TABLET ORAL EVERY 6 HOURS PRN
Status: DISCONTINUED | OUTPATIENT
Start: 2023-12-25 | End: 2024-01-02 | Stop reason: HOSPADM

## 2023-12-25 RX ORDER — POLYETHYLENE GLYCOL 3350 17 G/17G
17 POWDER, FOR SOLUTION ORAL DAILY PRN
Status: DISCONTINUED | OUTPATIENT
Start: 2023-12-25 | End: 2024-01-02 | Stop reason: HOSPADM

## 2023-12-25 RX ORDER — GABAPENTIN 800 MG/1
800 TABLET ORAL 3 TIMES DAILY
Status: DISCONTINUED | OUTPATIENT
Start: 2023-12-25 | End: 2023-12-26

## 2023-12-25 RX ORDER — SODIUM CHLORIDE 0.9 % (FLUSH) 0.9 %
5-40 SYRINGE (ML) INJECTION EVERY 12 HOURS SCHEDULED
Status: DISCONTINUED | OUTPATIENT
Start: 2023-12-25 | End: 2024-01-02 | Stop reason: HOSPADM

## 2023-12-25 RX ORDER — MAGNESIUM SULFATE IN WATER 40 MG/ML
2000 INJECTION, SOLUTION INTRAVENOUS PRN
Status: DISCONTINUED | OUTPATIENT
Start: 2023-12-25 | End: 2024-01-02 | Stop reason: HOSPADM

## 2023-12-25 RX ORDER — MORPHINE SULFATE 15 MG/1
30 TABLET ORAL EVERY 8 HOURS PRN
Status: DISCONTINUED | OUTPATIENT
Start: 2023-12-25 | End: 2023-12-29 | Stop reason: SDUPTHER

## 2023-12-25 RX ORDER — PROMETHAZINE HYDROCHLORIDE 25 MG/1
12.5 TABLET ORAL EVERY 6 HOURS PRN
Status: DISCONTINUED | OUTPATIENT
Start: 2023-12-25 | End: 2024-01-02 | Stop reason: HOSPADM

## 2023-12-25 RX ADMIN — MORPHINE SULFATE 4 MG: 4 INJECTION, SOLUTION INTRAMUSCULAR; INTRAVENOUS at 14:46

## 2023-12-25 RX ADMIN — IBUPROFEN 800 MG: 800 TABLET, FILM COATED ORAL at 12:54

## 2023-12-25 RX ADMIN — ENOXAPARIN SODIUM 40 MG: 100 INJECTION SUBCUTANEOUS at 18:54

## 2023-12-25 RX ADMIN — MORPHINE SULFATE 60 MG: 30 TABLET, FILM COATED, EXTENDED RELEASE ORAL at 17:52

## 2023-12-25 RX ADMIN — MORPHINE SULFATE 4 MG: 4 INJECTION, SOLUTION INTRAMUSCULAR; INTRAVENOUS at 21:14

## 2023-12-25 RX ADMIN — FUROSEMIDE 20 MG: 20 TABLET ORAL at 18:54

## 2023-12-25 RX ADMIN — SODIUM CHLORIDE, PRESERVATIVE FREE 10 ML: 5 INJECTION INTRAVENOUS at 21:00

## 2023-12-25 RX ADMIN — ACETAMINOPHEN 1000 MG: 500 TABLET ORAL at 13:36

## 2023-12-25 ASSESSMENT — PAIN DESCRIPTION - ORIENTATION
ORIENTATION: RIGHT;LEFT
ORIENTATION: RIGHT;LEFT;LOWER
ORIENTATION: LOWER;LEFT;RIGHT
ORIENTATION: LEFT;RIGHT;LOWER

## 2023-12-25 ASSESSMENT — PAIN DESCRIPTION - LOCATION
LOCATION: BACK;BUTTOCKS
LOCATION: SACRUM
LOCATION: BACK

## 2023-12-25 ASSESSMENT — PAIN SCALES - GENERAL
PAINLEVEL_OUTOF10: 6
PAINLEVEL_OUTOF10: 5
PAINLEVEL_OUTOF10: 7
PAINLEVEL_OUTOF10: 7
PAINLEVEL_OUTOF10: 8

## 2023-12-25 ASSESSMENT — PAIN DESCRIPTION - DESCRIPTORS
DESCRIPTORS: ACHING
DESCRIPTORS: ACHING
DESCRIPTORS: SHARP

## 2023-12-25 ASSESSMENT — PAIN DESCRIPTION - ONSET: ONSET: SUDDEN

## 2023-12-25 ASSESSMENT — PAIN DESCRIPTION - FREQUENCY
FREQUENCY: CONTINUOUS

## 2023-12-25 ASSESSMENT — PAIN DESCRIPTION - PAIN TYPE
TYPE: ACUTE PAIN

## 2023-12-25 ASSESSMENT — PAIN - FUNCTIONAL ASSESSMENT: PAIN_FUNCTIONAL_ASSESSMENT: 0-10

## 2023-12-25 NOTE — ED NOTES
Pt reports desire to wait to change into gown until she reaches [facility] unit. This RN verbalizing understanding.

## 2023-12-25 NOTE — ED TRIAGE NOTES
Pt arrives to ER via EMS with c/o lower back pain that radiates into her upper legs. Pt reports she has chronic back pain but was exacerbated by a GLF this morning after tripping over her dog. Denies head injury, no LOC.

## 2023-12-25 NOTE — ED PROVIDER NOTES
[facility] EMERGENCY DEPT  EMERGENCY DEPARTMENT ENCOUNTER      Pt Name: Cindy Ortega  MRN: [id number]  Birthdate 1975  Date of evaluation: 12/25/2023  Provider: Higinio Arthur PA-C    CHIEF COMPLAINT       Chief Complaint   Patient presents with    Back Pain    Leg Pain    Fall         HISTORY OF PRESENT ILLNESS   (Location/Symptom, Timing/Onset, Context/Setting, Quality, Duration, Modifying Factors, Severity)  Note limiting factors.   48-year-old female with extensive past medical history as below presents with complaint of lower back pain.  She reports that she has chronic back pain, however it was worsened today by a fall.  Patient states she was walking when her dog got between her legs, tripping her, causing her to fall to the ground.  Patient denies hitting her head.  There is no loss of consciousness.  She is not currently on any blood thinners.  Reports that the pain radiates into her upper legs.  Patient does have a personal history of cancer.  Denies history of IV drug use, urinary/bowel incontinence, numbness/tingling to groin area.  No other concerns at this time.            Review of External Medical Records:     Nursing Notes were reviewed.    REVIEW OF SYSTEMS    (2-9 systems for level 4, 10 or more for level 5)     Review of Systems    Except as noted above the remainder of the review of systems was reviewed and negative.       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

## 2023-12-25 NOTE — ED NOTES
TRANSFER - OUT REPORT:    Verbal report given to Chaim LARRY on Cindy Ortega  being transferred to [facility] 4th floor for routine progression of patient care       Report consisted of patient's Situation, Background, Assessment and   Recommendations(SBAR).     Information from the following report(s) ED SBAR, MAR, and Recent Results was reviewed with the receiving nurse.    Kinder Fall Assessment:                           Lines:   CVC Double Lumen 12/25/23 Right Subclavian (Active)   Site Assessment Clean, dry & intact 12/25/23 1438   Phlebitis Assessment No symptoms 12/25/23 1438   Infiltration Assessment 0 12/25/23 1438   Dressing Status Clean, dry & intact 12/25/23 1438        Opportunity for questions and clarification was provided.      Patient transported with [address]

## 2023-12-25 NOTE — H&P
RETENTION OF 3 POUNDS OR MORE UNTIL RETURN TO BASELINE WEIGHT 8/26/22   Automatic Reconciliation, Ar   gabapentin (NEURONTIN) 800 MG tablet Take by mouth 3 times daily. 9/21/22   Automatic Reconciliation, Ar   heparin flush 100 UNIT/ML injection Infuse intravenously daily 6/11/21   Automatic Reconciliation, Ar   lactated ringers IV soln infusion ceived the following from [facility]: Outside name: Ringer's solution,lactated (lactated Ringers) infusion 7/22/22   Automatic Reconciliation, Ar   morphine (MSIR) 30 MG tablet take 1 tablet by mouth every 8 hours if needed for severe pain 3/2/22   Automatic Reconciliation, Ar   morphine (MS CONTIN) 60 MG extended release tablet Take by mouth every 8 hours. 6/11/21   Automatic Reconciliation, Ar   naloxone (NARCAN) 4 MG/0.1ML LIQD nasal spray Narcan 4 mg/actuation nasal spray (spray 1 actuation via nasal for opiod overdose) 12/17/20   Automatic Reconciliation, Ar   potassium chloride (MICRO-K) 10 MEQ extended release capsule potassium chloride ER 10 mEq capsule,extended release   take 1 capsule by mouth every morning WHILE ON LASIX    Automatic Reconciliation, Ar   promethazine (PHENERGAN) 25 MG tablet Take by mouth every 6 hours as needed    Automatic Reconciliation, Ar       REVIEW OF SYSTEMS:  See HPI for details  General: negative for fever, chills, sweats, weakness, weight loss  Eyes: negative for blurred vision, eye pain, loss of vision, diplopia  Ear Nose and Throat: negative for rhinorrhea, pharyngitis, otalgia, tinnitus, speech or swallowing difficulties  Respiratory:  negative for pleuritic pain, cough, sputum production, wheezing, SOB, OCSAR  Cardiology:  negative for chest pain, palpitations, orthopnea, PND, edema, syncope   Gastrointestinal: negative for abdominal pain, N/V, dysphagia, change in bowel habits, bleeding  Genitourinary: negative for frequency, urgency, dysuria, hematuria, incontinence  Muskuloskeletal : Positive for back pain and

## 2023-12-25 NOTE — ED NOTES
SBAR given to Carey [address] [facility]. [facility] provided with all necessary documentation.

## 2023-12-26 ENCOUNTER — APPOINTMENT (OUTPATIENT)
Facility: HOSPITAL | Age: 48
DRG: 543 | End: 2023-12-26
Payer: MEDICARE

## 2023-12-26 LAB
ANION GAP SERPL CALC-SCNC: 8 MMOL/L (ref 5–15)
BASOPHILS # BLD: 0 K/UL (ref 0–0.1)
BASOPHILS NFR BLD: 0 % (ref 0–1)
BUN SERPL-MCNC: 23 MG/DL (ref 6–20)
BUN/CREAT SERPL: 22 (ref 12–20)
CALCIUM SERPL-MCNC: 8.8 MG/DL (ref 8.5–10.1)
CHLORIDE SERPL-SCNC: 109 MMOL/L (ref 97–108)
CO2 SERPL-SCNC: 24 MMOL/L (ref 21–32)
CREAT SERPL-MCNC: 1.03 MG/DL (ref 0.55–1.02)
DIFFERENTIAL METHOD BLD: ABNORMAL
EOSINOPHIL # BLD: 0.2 K/UL (ref 0–0.4)
EOSINOPHIL NFR BLD: 2 % (ref 0–7)
ERYTHROCYTE [DISTWIDTH] IN BLOOD BY AUTOMATED COUNT: 13.5 % (ref 11.5–14.5)
GLUCOSE SERPL-MCNC: 87 MG/DL (ref 65–100)
HCT VFR BLD AUTO: 35.3 % (ref 35–47)
HGB BLD-MCNC: 12 G/DL (ref 11.5–16)
IMM GRANULOCYTES # BLD AUTO: 0 K/UL (ref 0–0.04)
IMM GRANULOCYTES NFR BLD AUTO: 0 % (ref 0–0.5)
LYMPHOCYTES # BLD: 2.2 K/UL (ref 0.8–3.5)
LYMPHOCYTES NFR BLD: 32 % (ref 12–49)
MAGNESIUM SERPL-MCNC: 1.4 MG/DL (ref 1.6–2.4)
MCH RBC QN AUTO: 34.1 PG (ref 26–34)
MCHC RBC AUTO-ENTMCNC: 34 G/DL (ref 30–36.5)
MCV RBC AUTO: 100.3 FL (ref 80–99)
MONOCYTES # BLD: 0.6 K/UL (ref 0–1)
MONOCYTES NFR BLD: 9 % (ref 5–13)
NEUTS SEG # BLD: 4 K/UL (ref 1.8–8)
NEUTS SEG NFR BLD: 57 % (ref 32–75)
NRBC # BLD: 0 K/UL (ref 0–0.01)
NRBC BLD-RTO: 0 PER 100 WBC
PLATELET # BLD AUTO: 108 K/UL (ref 150–400)
PMV BLD AUTO: 9.5 FL (ref 8.9–12.9)
POTASSIUM SERPL-SCNC: 3.4 MMOL/L (ref 3.5–5.1)
RBC # BLD AUTO: 3.52 M/UL (ref 3.8–5.2)
SODIUM SERPL-SCNC: 141 MMOL/L (ref 136–145)
WBC # BLD AUTO: 7 K/UL (ref 3.6–11)

## 2023-12-26 PROCEDURE — 73610 X-RAY EXAM OF ANKLE: CPT

## 2023-12-26 PROCEDURE — 97530 THERAPEUTIC ACTIVITIES: CPT

## 2023-12-26 PROCEDURE — 97165 OT EVAL LOW COMPLEX 30 MIN: CPT

## 2023-12-26 PROCEDURE — 99233 SBSQ HOSP IP/OBS HIGH 50: CPT | Performed by: NURSE PRACTITIONER

## 2023-12-26 PROCEDURE — 94761 N-INVAS EAR/PLS OXIMETRY MLT: CPT

## 2023-12-26 PROCEDURE — 80048 BASIC METABOLIC PNL TOTAL CA: CPT

## 2023-12-26 PROCEDURE — 83735 ASSAY OF MAGNESIUM: CPT

## 2023-12-26 PROCEDURE — 2500000003 HC RX 250 WO HCPCS: Performed by: INTERNAL MEDICINE

## 2023-12-26 PROCEDURE — 36415 COLL VENOUS BLD VENIPUNCTURE: CPT

## 2023-12-26 PROCEDURE — 6370000000 HC RX 637 (ALT 250 FOR IP): Performed by: HOSPITALIST

## 2023-12-26 PROCEDURE — 1100000000 HC RM PRIVATE

## 2023-12-26 PROCEDURE — 2500000003 HC RX 250 WO HCPCS: Performed by: NURSE PRACTITIONER

## 2023-12-26 PROCEDURE — 97161 PT EVAL LOW COMPLEX 20 MIN: CPT

## 2023-12-26 PROCEDURE — 2580000003 HC RX 258: Performed by: HOSPITALIST

## 2023-12-26 PROCEDURE — 85025 COMPLETE CBC W/AUTO DIFF WBC: CPT

## 2023-12-26 PROCEDURE — 6370000000 HC RX 637 (ALT 250 FOR IP): Performed by: NURSE PRACTITIONER

## 2023-12-26 PROCEDURE — 6360000002 HC RX W HCPCS: Performed by: HOSPITALIST

## 2023-12-26 RX ORDER — MAGNESIUM SULFATE HEPTAHYDRATE 40 MG/ML
2000 INJECTION, SOLUTION INTRAVENOUS ONCE
Status: COMPLETED | OUTPATIENT
Start: 2023-12-26 | End: 2023-12-26

## 2023-12-26 RX ORDER — HYDROMORPHONE HYDROCHLORIDE 1 MG/ML
1 INJECTION, SOLUTION INTRAMUSCULAR; INTRAVENOUS; SUBCUTANEOUS ONCE
Status: COMPLETED | OUTPATIENT
Start: 2023-12-26 | End: 2023-12-26

## 2023-12-26 RX ORDER — POTASSIUM CHLORIDE 750 MG/1
30 TABLET, FILM COATED, EXTENDED RELEASE ORAL EVERY 4 HOURS
Status: COMPLETED | OUTPATIENT
Start: 2023-12-26 | End: 2023-12-26

## 2023-12-26 RX ADMIN — MAGNESIUM SULFATE HEPTAHYDRATE 2000 MG: 40 INJECTION, SOLUTION INTRAVENOUS at 06:36

## 2023-12-26 RX ADMIN — SODIUM CHLORIDE, PRESERVATIVE FREE 10 ML: 5 INJECTION INTRAVENOUS at 09:58

## 2023-12-26 RX ADMIN — ENOXAPARIN SODIUM 40 MG: 100 INJECTION SUBCUTANEOUS at 17:55

## 2023-12-26 RX ADMIN — MORPHINE SULFATE 30 MG: 15 TABLET ORAL at 05:50

## 2023-12-26 RX ADMIN — MORPHINE SULFATE 60 MG: 30 TABLET, FILM COATED, EXTENDED RELEASE ORAL at 02:29

## 2023-12-26 RX ADMIN — HYDROMORPHONE HYDROCHLORIDE 1 MG: 1 INJECTION, SOLUTION INTRAMUSCULAR; INTRAVENOUS; SUBCUTANEOUS at 11:03

## 2023-12-26 RX ADMIN — ONDANSETRON 4 MG: 4 TABLET, ORALLY DISINTEGRATING ORAL at 03:27

## 2023-12-26 RX ADMIN — MORPHINE SULFATE 60 MG: 30 TABLET, FILM COATED, EXTENDED RELEASE ORAL at 17:55

## 2023-12-26 RX ADMIN — MORPHINE SULFATE 4 MG: 4 INJECTION, SOLUTION INTRAMUSCULAR; INTRAVENOUS at 08:23

## 2023-12-26 RX ADMIN — MORPHINE SULFATE 30 MG: 15 TABLET ORAL at 21:21

## 2023-12-26 RX ADMIN — POTASSIUM CHLORIDE 30 MEQ: 750 TABLET, FILM COATED, EXTENDED RELEASE ORAL at 09:57

## 2023-12-26 RX ADMIN — MORPHINE SULFATE 4 MG: 4 INJECTION, SOLUTION INTRAMUSCULAR; INTRAVENOUS at 13:41

## 2023-12-26 RX ADMIN — DRONABINOL 5 MG: 2.5 CAPSULE ORAL at 09:57

## 2023-12-26 RX ADMIN — POTASSIUM CHLORIDE 30 MEQ: 750 TABLET, FILM COATED, EXTENDED RELEASE ORAL at 06:27

## 2023-12-26 RX ADMIN — SODIUM CHLORIDE, PRESERVATIVE FREE 10 ML: 5 INJECTION INTRAVENOUS at 22:00

## 2023-12-26 RX ADMIN — MORPHINE SULFATE 60 MG: 30 TABLET, FILM COATED, EXTENDED RELEASE ORAL at 09:57

## 2023-12-26 RX ADMIN — ACETAMINOPHEN 650 MG: 325 TABLET ORAL at 00:09

## 2023-12-26 ASSESSMENT — PAIN DESCRIPTION - LOCATION
LOCATION: SACRUM
LOCATION: SACRUM
LOCATION: BACK;RIB CAGE
LOCATION: ABDOMEN
LOCATION: SACRUM;ABDOMEN
LOCATION: BACK
LOCATION: ABDOMEN

## 2023-12-26 ASSESSMENT — PAIN SCALES - GENERAL
PAINLEVEL_OUTOF10: 6
PAINLEVEL_OUTOF10: 5
PAINLEVEL_OUTOF10: 6
PAINLEVEL_OUTOF10: 6
PAINLEVEL_OUTOF10: 7
PAINLEVEL_OUTOF10: 8
PAINLEVEL_OUTOF10: 10
PAINLEVEL_OUTOF10: 6
PAINLEVEL_OUTOF10: 6

## 2023-12-26 ASSESSMENT — PAIN DESCRIPTION - DESCRIPTORS
DESCRIPTORS: CRAMPING
DESCRIPTORS: ACHING

## 2023-12-26 ASSESSMENT — PAIN DESCRIPTION - ORIENTATION
ORIENTATION: UPPER
ORIENTATION: LOWER

## 2023-12-26 NOTE — WOUND CARE
Wound Consult:  new consult Visit. Chart reviewed.  Consulted for abdominal fistula.  Spoke with patients nurse,  Chaim LARRY.  Patient is resting on a atilio bed with CHATO mattress.  Heels off loaded with pillows.  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  LLQ ostomy- kayden product, patient self care  Bilateral heels- no redness  Sacrum/buttocks- no redness  Treatment:  Fistula- cleansed with wound cleanser, sureprep to silver-wound, lightly packed with 1/4inch iodoform, 4x4 and tape in place.per patient home care  Wound Recommendations:  Fistula- cleansed with wound cleanser, sureprep to silver-wound, lightly packed with 1/4inch iodoform, 4x4 and tape in place.daily  Plan:  Spoke with Dr. Workman regarding findings and proposed orders for treatment.  Please re-consult should concerns arise despite continued skin/PI prevention measures.  Valery Morton RN  [facility], Wound / Ostomy Department  Wound Healing Office [phone]

## 2023-12-26 NOTE — CONSULTS
ORTHOPAEDIC CONSULT NOTE    Subjective:     Date of Consultation:  December 25, 2023      Cindy Ortega is a 48 y.o. female with PMH of pyelonephritis, UTI, Sz, jejunostomy, chronic steroid use, osteoporosis who is being seen for low back pain/ hip pain after GLF earlier this afternoon, pt reports she tripped over her puppy and fell backwards onto to her buttocks. Work up reveled bilateral sacral ala fractures likely insufficiency in nature and moderate L 3-4 canal stenosis. Pt prateek LE numbness/tingling, + pain to bilat hips/ low back with R>L. No issue with urinary incontinence noted, pt has an ostomy.     Patient Active Problem List    Diagnosis Date Noted    Sacral insufficiency fracture with delayed healing 12/25/2023    History of pyelonephritis 11/10/2023    History of sepsis 11/10/2023    Leukocytosis 11/06/2023    Thrombocytopenia (HCC) 11/06/2023    Urinary tract infection without hematuria 11/06/2023    Jejunostomy present ([facility]) 10/31/2023    Port-A-Cath in place 10/31/2023    On total parenteral nutrition (TPN) 10/31/2023    Seizure disorder (HCC) 11/09/2022    Prediabetes 12/18/2021    Secondary diabetes ([facility]) 11/23/2020    Pure hypercholesterolemia 11/04/2020    Right renal mass 11/02/2020    Chronic anemia 06/12/2018    Morbid obesity ([facility]) 10/27/2017    Crohn's disease ([facility]) 08/15/2011     Family History   Problem Relation Age of Onset    Other Father         arthritis    Hypertension Father     Stroke Father         Has had at least 3 strokes. 2 just this yr. Blood clot    Osteoarthritis Father     High Blood Pressure Father     Hypertension Mother     Osteoarthritis Mother     Heart Attack Mother     Allergy (Severe) Mother     High Blood Pressure Mother     Miscarriages / Stillbirths Mother     Obesity Mother     Anesth Problems Neg Hx       Social History     Tobacco Use    Smoking status: Former     Current packs/day: 0.00     Average packs/day: 1 pack/day for 10.0 years (10.0 ttl

## 2023-12-26 NOTE — CARE COORDINATION
12/26/23  9:37 AM       12/26/23 0934   Service Assessment   Patient Orientation Alert and Oriented   Cognition Alert   History Provided By Patient   Primary Caregiver Self   Support Systems Family Members;Home Care Staff;Other (Comment)  (Caregiver)   Patient's Healthcare Decision Maker is: Named in Scanned ACP Document   PCP Verified by CM Yes   Last Visit to PCP Within last 3 months   Prior Functional Level Assistance with the following:;Cooking;Housework;Shopping   Current Functional Level Assistance with the following:;Cooking;Housework;Shopping   Can patient return to prior living arrangement Yes   Ability to make needs known: Good   Family able to assist with home care needs: Yes   Would you like for me to discuss the discharge plan with any other family members/significant others, and if so, who? No   Financial Resources Medicaid;Medicare   Social/Functional History   Lives With Alone   Type of Home House   Home Layout Two level;Able to Live on Main level with bedroom/bathroom   Home Access Ramped entrance   Home Equipment Cane;Rollator;Wheelchair-manual;Walker, rolling   Receives Help From Home health;Personal care attendant   ADL Assistance Independent   Homemaking Assistance Needs assistance   Ambulation Assistance Independent   Transfer Assistance Independent   Active  No   Patient's  Info Caregiver   Discharge Planning   Living Arrangements Alone   Patient expects to be discharged to: House   Services At/After Discharge   Mode of Transport at Discharge Other (see comment)  (caregiver will transport at dc)   Condition of Participation: Discharge Planning   The Patient and/or Patient Representative was provided with a Choice of Provider? Patient   The Patient and/Or Patient Representative agree with the Discharge Plan? Yes   Freedom of Choice list was provided with basic dialogue that supports the patient's individualized plan of care/goals, treatment preferences, and shares the quality data

## 2023-12-27 LAB
ALBUMIN SERPL-MCNC: 2.6 G/DL (ref 3.5–5)
ANION GAP SERPL CALC-SCNC: 4 MMOL/L (ref 5–15)
BASOPHILS # BLD: 0 K/UL (ref 0–0.1)
BASOPHILS NFR BLD: 0 % (ref 0–1)
BUN SERPL-MCNC: 22 MG/DL (ref 6–20)
BUN/CREAT SERPL: 29 (ref 12–20)
CALCIUM SERPL-MCNC: 9.1 MG/DL (ref 8.5–10.1)
CHLORIDE SERPL-SCNC: 107 MMOL/L (ref 97–108)
CO2 SERPL-SCNC: 28 MMOL/L (ref 21–32)
CREAT SERPL-MCNC: 0.76 MG/DL (ref 0.55–1.02)
DIFFERENTIAL METHOD BLD: ABNORMAL
EOSINOPHIL # BLD: 0.2 K/UL (ref 0–0.4)
EOSINOPHIL NFR BLD: 3 % (ref 0–7)
ERYTHROCYTE [DISTWIDTH] IN BLOOD BY AUTOMATED COUNT: 13.4 % (ref 11.5–14.5)
GLUCOSE SERPL-MCNC: 96 MG/DL (ref 65–100)
HCT VFR BLD AUTO: 36.5 % (ref 35–47)
HGB BLD-MCNC: 12.5 G/DL (ref 11.5–16)
IMM GRANULOCYTES # BLD AUTO: 0 K/UL (ref 0–0.04)
IMM GRANULOCYTES NFR BLD AUTO: 0 % (ref 0–0.5)
LYMPHOCYTES # BLD: 2.4 K/UL (ref 0.8–3.5)
LYMPHOCYTES NFR BLD: 30 % (ref 12–49)
MAGNESIUM SERPL-MCNC: 1.8 MG/DL (ref 1.6–2.4)
MCH RBC QN AUTO: 34.5 PG (ref 26–34)
MCHC RBC AUTO-ENTMCNC: 34.2 G/DL (ref 30–36.5)
MCV RBC AUTO: 100.8 FL (ref 80–99)
MONOCYTES # BLD: 0.7 K/UL (ref 0–1)
MONOCYTES NFR BLD: 9 % (ref 5–13)
NEUTS SEG # BLD: 4.6 K/UL (ref 1.8–8)
NEUTS SEG NFR BLD: 58 % (ref 32–75)
NRBC # BLD: 0 K/UL (ref 0–0.01)
NRBC BLD-RTO: 0 PER 100 WBC
PHOSPHATE SERPL-MCNC: 2.4 MG/DL (ref 2.6–4.7)
PLATELET # BLD AUTO: 114 K/UL (ref 150–400)
PMV BLD AUTO: 11 FL (ref 8.9–12.9)
POTASSIUM SERPL-SCNC: 4 MMOL/L (ref 3.5–5.1)
RBC # BLD AUTO: 3.62 M/UL (ref 3.8–5.2)
SODIUM SERPL-SCNC: 139 MMOL/L (ref 136–145)
WBC # BLD AUTO: 8 K/UL (ref 3.6–11)

## 2023-12-27 PROCEDURE — 36415 COLL VENOUS BLD VENIPUNCTURE: CPT

## 2023-12-27 PROCEDURE — 1100000000 HC RM PRIVATE

## 2023-12-27 PROCEDURE — 94761 N-INVAS EAR/PLS OXIMETRY MLT: CPT

## 2023-12-27 PROCEDURE — 97535 SELF CARE MNGMENT TRAINING: CPT

## 2023-12-27 PROCEDURE — 80069 RENAL FUNCTION PANEL: CPT

## 2023-12-27 PROCEDURE — 2500000003 HC RX 250 WO HCPCS: Performed by: INTERNAL MEDICINE

## 2023-12-27 PROCEDURE — 97530 THERAPEUTIC ACTIVITIES: CPT

## 2023-12-27 PROCEDURE — 6360000002 HC RX W HCPCS: Performed by: HOSPITALIST

## 2023-12-27 PROCEDURE — 6370000000 HC RX 637 (ALT 250 FOR IP): Performed by: HOSPITALIST

## 2023-12-27 PROCEDURE — 2580000003 HC RX 258: Performed by: INTERNAL MEDICINE

## 2023-12-27 PROCEDURE — 85025 COMPLETE CBC W/AUTO DIFF WBC: CPT

## 2023-12-27 PROCEDURE — 6370000000 HC RX 637 (ALT 250 FOR IP): Performed by: INTERNAL MEDICINE

## 2023-12-27 PROCEDURE — L4360 PNEUMAT WALKING BOOT PRE CST: HCPCS | Performed by: PHYSICAL THERAPIST

## 2023-12-27 PROCEDURE — 97116 GAIT TRAINING THERAPY: CPT

## 2023-12-27 PROCEDURE — 2580000003 HC RX 258: Performed by: HOSPITALIST

## 2023-12-27 PROCEDURE — 83735 ASSAY OF MAGNESIUM: CPT

## 2023-12-27 RX ORDER — LOPERAMIDE HYDROCHLORIDE 2 MG/1
2 CAPSULE ORAL 4 TIMES DAILY PRN
Status: DISCONTINUED | OUTPATIENT
Start: 2023-12-27 | End: 2024-01-02 | Stop reason: HOSPADM

## 2023-12-27 RX ORDER — PANTOPRAZOLE SODIUM 40 MG/1
40 TABLET, DELAYED RELEASE ORAL
Status: DISCONTINUED | OUTPATIENT
Start: 2023-12-28 | End: 2023-12-28

## 2023-12-27 RX ADMIN — SODIUM PHOSPHATE, MONOBASIC, MONOHYDRATE AND SODIUM PHOSPHATE, DIBASIC, ANHYDROUS 20 MMOL: 142; 276 INJECTION, SOLUTION INTRAVENOUS at 09:32

## 2023-12-27 RX ADMIN — MORPHINE SULFATE 30 MG: 15 TABLET ORAL at 06:31

## 2023-12-27 RX ADMIN — MORPHINE SULFATE 60 MG: 30 TABLET, FILM COATED, EXTENDED RELEASE ORAL at 01:32

## 2023-12-27 RX ADMIN — LOPERAMIDE HYDROCHLORIDE 2 MG: 2 CAPSULE ORAL at 18:02

## 2023-12-27 RX ADMIN — SODIUM CHLORIDE, PRESERVATIVE FREE 10 ML: 5 INJECTION INTRAVENOUS at 09:25

## 2023-12-27 RX ADMIN — ENOXAPARIN SODIUM 40 MG: 100 INJECTION SUBCUTANEOUS at 18:02

## 2023-12-27 RX ADMIN — SODIUM CHLORIDE, PRESERVATIVE FREE 10 ML: 5 INJECTION INTRAVENOUS at 21:25

## 2023-12-27 RX ADMIN — ACETAMINOPHEN 650 MG: 325 TABLET ORAL at 13:03

## 2023-12-27 RX ADMIN — MORPHINE SULFATE 60 MG: 30 TABLET, FILM COATED, EXTENDED RELEASE ORAL at 09:30

## 2023-12-27 RX ADMIN — MORPHINE SULFATE 4 MG: 4 INJECTION, SOLUTION INTRAMUSCULAR; INTRAVENOUS at 10:03

## 2023-12-27 RX ADMIN — MORPHINE SULFATE 60 MG: 30 TABLET, FILM COATED, EXTENDED RELEASE ORAL at 18:01

## 2023-12-27 RX ADMIN — ASCORBIC ACID, VITAMIN A PALMITATE, CHOLECALCIFEROL, THIAMINE HYDROCHLORIDE, RIBOFLAVIN-5 PHOSPHATE SODIUM, PYRIDOXINE HYDROCHLORIDE, NIACINAMIDE, DEXPANTHENOL, ALPHA-TOCOPHEROL ACETATE, VITAMIN K1, FOLIC ACID, BIOTIN, CYANOCOBALAMIN: 200; 3300; 200; 6; 3.6; 6; 40; 15; 10; 150; 600; 60; 5 INJECTION, SOLUTION INTRAVENOUS at 18:09

## 2023-12-27 RX ADMIN — MORPHINE SULFATE 4 MG: 4 INJECTION, SOLUTION INTRAMUSCULAR; INTRAVENOUS at 22:53

## 2023-12-27 RX ADMIN — ACETAMINOPHEN 650 MG: 325 TABLET ORAL at 05:02

## 2023-12-27 RX ADMIN — DRONABINOL 5 MG: 2.5 CAPSULE ORAL at 09:26

## 2023-12-27 ASSESSMENT — PAIN SCALES - GENERAL
PAINLEVEL_OUTOF10: 7
PAINLEVEL_OUTOF10: 4
PAINLEVEL_OUTOF10: 6
PAINLEVEL_OUTOF10: 5
PAINLEVEL_OUTOF10: 5
PAINLEVEL_OUTOF10: 3
PAINLEVEL_OUTOF10: 7

## 2023-12-27 ASSESSMENT — PAIN DESCRIPTION - ORIENTATION
ORIENTATION: LOWER
ORIENTATION: RIGHT;LOWER

## 2023-12-27 ASSESSMENT — PAIN DESCRIPTION - DESCRIPTORS
DESCRIPTORS: ACHING

## 2023-12-27 ASSESSMENT — PAIN DESCRIPTION - LOCATION
LOCATION: BACK
LOCATION: ABDOMEN;BACK
LOCATION: HEAD

## 2023-12-27 NOTE — CARE COORDINATION
4:15 PM  CM received notification from [facility]- they cannot take a patient on TPN and have closed the referral. CM awaiting response from [facility]. Erma Diaz    12/27/23  2:34 PM       12/27/23 1433   Services At/After Discharge   Transition of Care Consult (CM Consult) Acute Rehab   Condition of Participation: Discharge Planning   The Patient and/or Patient Representative was provided with a Choice of Provider? Patient   The Patient and/Or Patient Representative agree with the Discharge Plan? Yes   Freedom of Choice list was provided with basic dialogue that supports the patient's individualized plan of care/goals, treatment preferences, and shares the quality data associated with the providers?  Yes  ([facility] and [facility])     CM noted PT and OT are recommending [facility]- CM met with patient and she is agreeable to a referral to [facility], patient is aware she will need insurance authorization. She has been to a SNF in the past and is not agreeable to referrals to SNFs. Referral placed in careport.     Erma Diaz  Care Manager

## 2023-12-28 LAB
ALBUMIN SERPL-MCNC: 2.4 G/DL (ref 3.5–5)
ANION GAP SERPL CALC-SCNC: 5 MMOL/L (ref 5–15)
BASOPHILS # BLD: 0 K/UL (ref 0–0.1)
BASOPHILS NFR BLD: 0 % (ref 0–1)
BUN SERPL-MCNC: 28 MG/DL (ref 6–20)
BUN/CREAT SERPL: 35 (ref 12–20)
CALCIUM SERPL-MCNC: 8.5 MG/DL (ref 8.5–10.1)
CHLORIDE SERPL-SCNC: 106 MMOL/L (ref 97–108)
CO2 SERPL-SCNC: 29 MMOL/L (ref 21–32)
CREAT SERPL-MCNC: 0.8 MG/DL (ref 0.55–1.02)
DIFFERENTIAL METHOD BLD: ABNORMAL
EOSINOPHIL # BLD: 0.3 K/UL (ref 0–0.4)
EOSINOPHIL NFR BLD: 4 % (ref 0–7)
ERYTHROCYTE [DISTWIDTH] IN BLOOD BY AUTOMATED COUNT: 13.3 % (ref 11.5–14.5)
GLUCOSE SERPL-MCNC: 125 MG/DL (ref 65–100)
HCT VFR BLD AUTO: 32.1 % (ref 35–47)
HGB BLD-MCNC: 10.8 G/DL (ref 11.5–16)
IMM GRANULOCYTES # BLD AUTO: 0 K/UL (ref 0–0.04)
IMM GRANULOCYTES NFR BLD AUTO: 0 % (ref 0–0.5)
LYMPHOCYTES # BLD: 2.2 K/UL (ref 0.8–3.5)
LYMPHOCYTES NFR BLD: 32 % (ref 12–49)
MAGNESIUM SERPL-MCNC: 1.8 MG/DL (ref 1.6–2.4)
MCH RBC QN AUTO: 34.5 PG (ref 26–34)
MCHC RBC AUTO-ENTMCNC: 33.6 G/DL (ref 30–36.5)
MCV RBC AUTO: 102.6 FL (ref 80–99)
MONOCYTES # BLD: 0.7 K/UL (ref 0–1)
MONOCYTES NFR BLD: 9 % (ref 5–13)
NEUTS SEG # BLD: 3.8 K/UL (ref 1.8–8)
NEUTS SEG NFR BLD: 55 % (ref 32–75)
NRBC # BLD: 0 K/UL (ref 0–0.01)
NRBC BLD-RTO: 0 PER 100 WBC
PHOSPHATE SERPL-MCNC: 2.9 MG/DL (ref 2.6–4.7)
PLATELET # BLD AUTO: 103 K/UL (ref 150–400)
PMV BLD AUTO: 9.5 FL (ref 8.9–12.9)
POTASSIUM SERPL-SCNC: 3.6 MMOL/L (ref 3.5–5.1)
RBC # BLD AUTO: 3.13 M/UL (ref 3.8–5.2)
SODIUM SERPL-SCNC: 140 MMOL/L (ref 136–145)
TRIGL SERPL-MCNC: 74 MG/DL
WBC # BLD AUTO: 6.9 K/UL (ref 3.6–11)

## 2023-12-28 PROCEDURE — 84478 ASSAY OF TRIGLYCERIDES: CPT

## 2023-12-28 PROCEDURE — 94761 N-INVAS EAR/PLS OXIMETRY MLT: CPT

## 2023-12-28 PROCEDURE — 85025 COMPLETE CBC W/AUTO DIFF WBC: CPT

## 2023-12-28 PROCEDURE — 97530 THERAPEUTIC ACTIVITIES: CPT

## 2023-12-28 PROCEDURE — 97535 SELF CARE MNGMENT TRAINING: CPT

## 2023-12-28 PROCEDURE — 6370000000 HC RX 637 (ALT 250 FOR IP): Performed by: INTERNAL MEDICINE

## 2023-12-28 PROCEDURE — C9113 INJ PANTOPRAZOLE SODIUM, VIA: HCPCS | Performed by: INTERNAL MEDICINE

## 2023-12-28 PROCEDURE — 2500000003 HC RX 250 WO HCPCS: Performed by: INTERNAL MEDICINE

## 2023-12-28 PROCEDURE — 6370000000 HC RX 637 (ALT 250 FOR IP): Performed by: HOSPITALIST

## 2023-12-28 PROCEDURE — 97116 GAIT TRAINING THERAPY: CPT

## 2023-12-28 PROCEDURE — 2580000003 HC RX 258: Performed by: INTERNAL MEDICINE

## 2023-12-28 PROCEDURE — A4216 STERILE WATER/SALINE, 10 ML: HCPCS | Performed by: INTERNAL MEDICINE

## 2023-12-28 PROCEDURE — 36415 COLL VENOUS BLD VENIPUNCTURE: CPT

## 2023-12-28 PROCEDURE — 2580000003 HC RX 258: Performed by: HOSPITALIST

## 2023-12-28 PROCEDURE — 6360000002 HC RX W HCPCS: Performed by: INTERNAL MEDICINE

## 2023-12-28 PROCEDURE — 97110 THERAPEUTIC EXERCISES: CPT

## 2023-12-28 PROCEDURE — 1100000000 HC RM PRIVATE

## 2023-12-28 PROCEDURE — 80069 RENAL FUNCTION PANEL: CPT

## 2023-12-28 PROCEDURE — 83735 ASSAY OF MAGNESIUM: CPT

## 2023-12-28 PROCEDURE — 6360000002 HC RX W HCPCS: Performed by: HOSPITALIST

## 2023-12-28 RX ORDER — CYCLOBENZAPRINE HCL 10 MG
10 TABLET ORAL 3 TIMES DAILY PRN
Status: DISCONTINUED | OUTPATIENT
Start: 2023-12-28 | End: 2024-01-02 | Stop reason: HOSPADM

## 2023-12-28 RX ORDER — PREDNISONE 5 MG/1
5 TABLET ORAL DAILY
Status: DISCONTINUED | OUTPATIENT
Start: 2023-12-28 | End: 2024-01-02 | Stop reason: HOSPADM

## 2023-12-28 RX ORDER — OXYCODONE HYDROCHLORIDE 5 MG/1
10 TABLET ORAL
Status: COMPLETED | OUTPATIENT
Start: 2023-12-28 | End: 2023-12-28

## 2023-12-28 RX ADMIN — MORPHINE SULFATE 60 MG: 30 TABLET, FILM COATED, EXTENDED RELEASE ORAL at 17:57

## 2023-12-28 RX ADMIN — PANTOPRAZOLE SODIUM 40 MG: 40 TABLET, DELAYED RELEASE ORAL at 06:10

## 2023-12-28 RX ADMIN — SODIUM CHLORIDE, PRESERVATIVE FREE 40 MG: 5 INJECTION INTRAVENOUS at 10:58

## 2023-12-28 RX ADMIN — LOPERAMIDE HYDROCHLORIDE 2 MG: 2 CAPSULE ORAL at 18:01

## 2023-12-28 RX ADMIN — OXYCODONE HYDROCHLORIDE 10 MG: 5 TABLET ORAL at 13:07

## 2023-12-28 RX ADMIN — PREDNISONE 5 MG: 5 TABLET ORAL at 10:58

## 2023-12-28 RX ADMIN — MORPHINE SULFATE 60 MG: 30 TABLET, FILM COATED, EXTENDED RELEASE ORAL at 01:56

## 2023-12-28 RX ADMIN — CYCLOBENZAPRINE 10 MG: 10 TABLET, FILM COATED ORAL at 13:51

## 2023-12-28 RX ADMIN — ASCORBIC ACID, VITAMIN A PALMITATE, CHOLECALCIFEROL, THIAMINE HYDROCHLORIDE, RIBOFLAVIN-5 PHOSPHATE SODIUM, PYRIDOXINE HYDROCHLORIDE, NIACINAMIDE, DEXPANTHENOL, ALPHA-TOCOPHEROL ACETATE, VITAMIN K1, FOLIC ACID, BIOTIN, CYANOCOBALAMIN: 200; 3300; 200; 6; 3.6; 6; 40; 15; 10; 150; 600; 60; 5 INJECTION, SOLUTION INTRAVENOUS at 19:05

## 2023-12-28 RX ADMIN — ENOXAPARIN SODIUM 40 MG: 100 INJECTION SUBCUTANEOUS at 17:57

## 2023-12-28 RX ADMIN — DRONABINOL 5 MG: 2.5 CAPSULE ORAL at 09:36

## 2023-12-28 RX ADMIN — I.V. FAT EMULSION 250 ML: 20 EMULSION INTRAVENOUS at 19:07

## 2023-12-28 RX ADMIN — FUROSEMIDE 20 MG: 20 TABLET ORAL at 09:36

## 2023-12-28 RX ADMIN — MORPHINE SULFATE 30 MG: 15 TABLET ORAL at 07:16

## 2023-12-28 RX ADMIN — SODIUM CHLORIDE, PRESERVATIVE FREE 10 ML: 5 INJECTION INTRAVENOUS at 09:39

## 2023-12-28 RX ADMIN — MORPHINE SULFATE 30 MG: 15 TABLET ORAL at 15:26

## 2023-12-28 RX ADMIN — MORPHINE SULFATE 60 MG: 30 TABLET, FILM COATED, EXTENDED RELEASE ORAL at 09:38

## 2023-12-28 RX ADMIN — MORPHINE SULFATE 30 MG: 15 TABLET ORAL at 23:20

## 2023-12-28 RX ADMIN — ACETAMINOPHEN 650 MG: 325 TABLET ORAL at 18:01

## 2023-12-28 ASSESSMENT — PAIN DESCRIPTION - DESCRIPTORS: DESCRIPTORS: ACHING

## 2023-12-28 ASSESSMENT — PAIN DESCRIPTION - LOCATION
LOCATION: GENERALIZED
LOCATION: BACK;LEG
LOCATION: BACK
LOCATION: GENERALIZED

## 2023-12-28 ASSESSMENT — PAIN SCALES - GENERAL
PAINLEVEL_OUTOF10: 8
PAINLEVEL_OUTOF10: 6
PAINLEVEL_OUTOF10: 8
PAINLEVEL_OUTOF10: 8

## 2023-12-28 ASSESSMENT — PAIN DESCRIPTION - ORIENTATION: ORIENTATION: LOWER

## 2023-12-28 NOTE — CARE COORDINATION
Care Management Progress Note      ICD-10-CM    1. Bilateral sacral insufficiency fracture, initial encounter  M84.48XA       2. Intractable pain  R52           RUR:  16%  Risk Level: []Low [x]Moderate []High    Transition of care plan:  Per IDR, patient continues to work with PT/OT without IV pain medication.     Dispo: [facility] accepted patient. MD agrees patient is ready to start auth process. Per liaison auth to be started today 12/28. Patient has been updated and remains in agreement with dc plan.    Outpatient follow-up.    Pt will need IV[facility] transport at discharge.         ___________________________________________   Sweta Johnson RN Case Manager  12/28/2023   2:39 PM

## 2023-12-29 LAB
ALBUMIN SERPL-MCNC: 2.4 G/DL (ref 3.5–5)
ANION GAP SERPL CALC-SCNC: 3 MMOL/L (ref 5–15)
BASOPHILS # BLD: 0 K/UL (ref 0–0.1)
BASOPHILS NFR BLD: 0 % (ref 0–1)
BUN SERPL-MCNC: 35 MG/DL (ref 6–20)
BUN/CREAT SERPL: 41 (ref 12–20)
CALCIUM SERPL-MCNC: 8.4 MG/DL (ref 8.5–10.1)
CHLORIDE SERPL-SCNC: 105 MMOL/L (ref 97–108)
CO2 SERPL-SCNC: 29 MMOL/L (ref 21–32)
CREAT SERPL-MCNC: 0.86 MG/DL (ref 0.55–1.02)
DIFFERENTIAL METHOD BLD: ABNORMAL
EOSINOPHIL # BLD: 0.2 K/UL (ref 0–0.4)
EOSINOPHIL NFR BLD: 2 % (ref 0–7)
ERYTHROCYTE [DISTWIDTH] IN BLOOD BY AUTOMATED COUNT: 12.9 % (ref 11.5–14.5)
GLUCOSE SERPL-MCNC: 162 MG/DL (ref 65–100)
HCT VFR BLD AUTO: 32 % (ref 35–47)
HGB BLD-MCNC: 11.1 G/DL (ref 11.5–16)
IMM GRANULOCYTES # BLD AUTO: 0 K/UL (ref 0–0.04)
IMM GRANULOCYTES NFR BLD AUTO: 0 % (ref 0–0.5)
LYMPHOCYTES # BLD: 2.5 K/UL (ref 0.8–3.5)
LYMPHOCYTES NFR BLD: 33 % (ref 12–49)
MAGNESIUM SERPL-MCNC: 1.6 MG/DL (ref 1.6–2.4)
MCH RBC QN AUTO: 35.6 PG (ref 26–34)
MCHC RBC AUTO-ENTMCNC: 34.7 G/DL (ref 30–36.5)
MCV RBC AUTO: 102.6 FL (ref 80–99)
MONOCYTES # BLD: 0.7 K/UL (ref 0–1)
MONOCYTES NFR BLD: 9 % (ref 5–13)
NEUTS SEG # BLD: 4.3 K/UL (ref 1.8–8)
NEUTS SEG NFR BLD: 56 % (ref 32–75)
NRBC # BLD: 0 K/UL (ref 0–0.01)
NRBC BLD-RTO: 0 PER 100 WBC
PHOSPHATE SERPL-MCNC: 2.7 MG/DL (ref 2.6–4.7)
PLATELET # BLD AUTO: 125 K/UL (ref 150–400)
PMV BLD AUTO: 9.9 FL (ref 8.9–12.9)
POTASSIUM SERPL-SCNC: 4.2 MMOL/L (ref 3.5–5.1)
RBC # BLD AUTO: 3.12 M/UL (ref 3.8–5.2)
SODIUM SERPL-SCNC: 137 MMOL/L (ref 136–145)
WBC # BLD AUTO: 7.7 K/UL (ref 3.6–11)

## 2023-12-29 PROCEDURE — 97110 THERAPEUTIC EXERCISES: CPT

## 2023-12-29 PROCEDURE — 2500000003 HC RX 250 WO HCPCS: Performed by: INTERNAL MEDICINE

## 2023-12-29 PROCEDURE — 85025 COMPLETE CBC W/AUTO DIFF WBC: CPT

## 2023-12-29 PROCEDURE — 6360000002 HC RX W HCPCS: Performed by: HOSPITALIST

## 2023-12-29 PROCEDURE — 6370000000 HC RX 637 (ALT 250 FOR IP): Performed by: INTERNAL MEDICINE

## 2023-12-29 PROCEDURE — 1100000000 HC RM PRIVATE

## 2023-12-29 PROCEDURE — 6370000000 HC RX 637 (ALT 250 FOR IP): Performed by: HOSPITALIST

## 2023-12-29 PROCEDURE — A4216 STERILE WATER/SALINE, 10 ML: HCPCS | Performed by: INTERNAL MEDICINE

## 2023-12-29 PROCEDURE — 2580000003 HC RX 258: Performed by: HOSPITALIST

## 2023-12-29 PROCEDURE — 2580000003 HC RX 258: Performed by: INTERNAL MEDICINE

## 2023-12-29 PROCEDURE — 97530 THERAPEUTIC ACTIVITIES: CPT

## 2023-12-29 PROCEDURE — 36415 COLL VENOUS BLD VENIPUNCTURE: CPT

## 2023-12-29 PROCEDURE — 97535 SELF CARE MNGMENT TRAINING: CPT

## 2023-12-29 PROCEDURE — 80069 RENAL FUNCTION PANEL: CPT

## 2023-12-29 PROCEDURE — 94761 N-INVAS EAR/PLS OXIMETRY MLT: CPT

## 2023-12-29 PROCEDURE — 83735 ASSAY OF MAGNESIUM: CPT

## 2023-12-29 PROCEDURE — C9113 INJ PANTOPRAZOLE SODIUM, VIA: HCPCS | Performed by: INTERNAL MEDICINE

## 2023-12-29 PROCEDURE — 6360000002 HC RX W HCPCS: Performed by: INTERNAL MEDICINE

## 2023-12-29 RX ORDER — MORPHINE SULFATE 30 MG/1
30 TABLET, FILM COATED, EXTENDED RELEASE ORAL EVERY 6 HOURS
Status: DISCONTINUED | OUTPATIENT
Start: 2023-12-29 | End: 2023-12-29

## 2023-12-29 RX ORDER — MORPHINE SULFATE 15 MG/1
60 TABLET ORAL EVERY 6 HOURS PRN
Status: DISCONTINUED | OUTPATIENT
Start: 2023-12-29 | End: 2024-01-02 | Stop reason: HOSPADM

## 2023-12-29 RX ORDER — MORPHINE SULFATE 30 MG/1
60 TABLET, FILM COATED, EXTENDED RELEASE ORAL EVERY 8 HOURS
Status: DISCONTINUED | OUTPATIENT
Start: 2023-12-29 | End: 2024-01-02 | Stop reason: HOSPADM

## 2023-12-29 RX ADMIN — MORPHINE SULFATE 60 MG: 30 TABLET, FILM COATED, EXTENDED RELEASE ORAL at 15:30

## 2023-12-29 RX ADMIN — MORPHINE SULFATE 30 MG: 30 TABLET, FILM COATED, EXTENDED RELEASE ORAL at 09:26

## 2023-12-29 RX ADMIN — MORPHINE SULFATE 60 MG: 15 TABLET ORAL at 19:49

## 2023-12-29 RX ADMIN — ASCORBIC ACID, VITAMIN A PALMITATE, CHOLECALCIFEROL, THIAMINE HYDROCHLORIDE, RIBOFLAVIN-5 PHOSPHATE SODIUM, PYRIDOXINE HYDROCHLORIDE, NIACINAMIDE, DEXPANTHENOL, ALPHA-TOCOPHEROL ACETATE, VITAMIN K1, FOLIC ACID, BIOTIN, CYANOCOBALAMIN: 200; 3300; 200; 6; 3.6; 6; 40; 15; 10; 150; 600; 60; 5 INJECTION, SOLUTION INTRAVENOUS at 18:42

## 2023-12-29 RX ADMIN — FUROSEMIDE 20 MG: 20 TABLET ORAL at 09:26

## 2023-12-29 RX ADMIN — ACETAMINOPHEN 650 MG: 325 TABLET ORAL at 05:34

## 2023-12-29 RX ADMIN — SODIUM CHLORIDE, PRESERVATIVE FREE 40 MG: 5 INJECTION INTRAVENOUS at 09:26

## 2023-12-29 RX ADMIN — ENOXAPARIN SODIUM 40 MG: 100 INJECTION SUBCUTANEOUS at 18:58

## 2023-12-29 RX ADMIN — MORPHINE SULFATE 60 MG: 15 TABLET ORAL at 12:39

## 2023-12-29 RX ADMIN — MORPHINE SULFATE 30 MG: 15 TABLET ORAL at 07:06

## 2023-12-29 RX ADMIN — CYCLOBENZAPRINE 10 MG: 10 TABLET, FILM COATED ORAL at 05:34

## 2023-12-29 RX ADMIN — CYCLOBENZAPRINE 10 MG: 10 TABLET, FILM COATED ORAL at 12:07

## 2023-12-29 RX ADMIN — DRONABINOL 5 MG: 2.5 CAPSULE ORAL at 09:26

## 2023-12-29 RX ADMIN — SODIUM CHLORIDE, PRESERVATIVE FREE 10 ML: 5 INJECTION INTRAVENOUS at 19:49

## 2023-12-29 RX ADMIN — PREDNISONE 5 MG: 5 TABLET ORAL at 09:26

## 2023-12-29 RX ADMIN — MORPHINE SULFATE 60 MG: 30 TABLET, FILM COATED, EXTENDED RELEASE ORAL at 01:57

## 2023-12-29 ASSESSMENT — PAIN DESCRIPTION - DESCRIPTORS: DESCRIPTORS: ACHING

## 2023-12-29 ASSESSMENT — PAIN SCALES - GENERAL
PAINLEVEL_OUTOF10: 5
PAINLEVEL_OUTOF10: 9
PAINLEVEL_OUTOF10: 8

## 2023-12-29 ASSESSMENT — PAIN DESCRIPTION - ORIENTATION: ORIENTATION: POSTERIOR

## 2023-12-29 ASSESSMENT — PAIN DESCRIPTION - LOCATION: LOCATION: SACRUM

## 2023-12-29 NOTE — CARE COORDINATION
Care Management Progress Note      ICD-10-CM    1. Bilateral sacral insufficiency fracture, initial encounter  M84.48XA       2. Intractable pain  R52           RUR:  16%  Risk Level: []Low [x]Moderate []High    Transition of care plan:  Per IDR, patient's discharge is pending medical clearance, auth, and a dc order. PT/OT are following.     Dispo: Auth is pending at [facility], started on Thursday 12/28.    Outpatient follow-up.    Pt will need [facility] transport for discharge.        ___________________________________________   Sweta Johnson RN Case Manager  12/29/2023   12:27 PM

## 2023-12-30 LAB
ALBUMIN SERPL-MCNC: 2.3 G/DL (ref 3.5–5)
ANION GAP SERPL CALC-SCNC: 2 MMOL/L (ref 5–15)
BASOPHILS # BLD: 0 K/UL (ref 0–0.1)
BASOPHILS NFR BLD: 0 % (ref 0–1)
BUN SERPL-MCNC: 40 MG/DL (ref 6–20)
BUN/CREAT SERPL: 56 (ref 12–20)
CALCIUM SERPL-MCNC: 8.6 MG/DL (ref 8.5–10.1)
CHLORIDE SERPL-SCNC: 108 MMOL/L (ref 97–108)
CO2 SERPL-SCNC: 28 MMOL/L (ref 21–32)
CREAT SERPL-MCNC: 0.72 MG/DL (ref 0.55–1.02)
DIFFERENTIAL METHOD BLD: ABNORMAL
EOSINOPHIL # BLD: 0.1 K/UL (ref 0–0.4)
EOSINOPHIL NFR BLD: 2 % (ref 0–7)
ERYTHROCYTE [DISTWIDTH] IN BLOOD BY AUTOMATED COUNT: 12.7 % (ref 11.5–14.5)
GLUCOSE SERPL-MCNC: 132 MG/DL (ref 65–100)
HCT VFR BLD AUTO: 33.2 % (ref 35–47)
HGB BLD-MCNC: 10.8 G/DL (ref 11.5–16)
IMM GRANULOCYTES # BLD AUTO: 0 K/UL
IMM GRANULOCYTES NFR BLD AUTO: 0 %
LYMPHOCYTES # BLD: 2.3 K/UL (ref 0.8–3.5)
LYMPHOCYTES NFR BLD: 38 % (ref 12–49)
MAGNESIUM SERPL-MCNC: 1.7 MG/DL (ref 1.6–2.4)
MCH RBC QN AUTO: 33.8 PG (ref 26–34)
MCHC RBC AUTO-ENTMCNC: 32.5 G/DL (ref 30–36.5)
MCV RBC AUTO: 103.8 FL (ref 80–99)
MONOCYTES # BLD: 0.6 K/UL (ref 0–1)
MONOCYTES NFR BLD: 10 % (ref 5–13)
NEUTS SEG # BLD: 3.1 K/UL (ref 1.8–8)
NEUTS SEG NFR BLD: 50 % (ref 32–75)
NRBC # BLD: 0 K/UL (ref 0–0.01)
NRBC BLD-RTO: 0 PER 100 WBC
PHOSPHATE SERPL-MCNC: 2.9 MG/DL (ref 2.6–4.7)
PLATELET # BLD AUTO: 121 K/UL (ref 150–400)
PMV BLD AUTO: 10.1 FL (ref 8.9–12.9)
POTASSIUM SERPL-SCNC: 4.9 MMOL/L (ref 3.5–5.1)
RBC # BLD AUTO: 3.2 M/UL (ref 3.8–5.2)
RBC MORPH BLD: ABNORMAL
SODIUM SERPL-SCNC: 138 MMOL/L (ref 136–145)
WBC # BLD AUTO: 6.1 K/UL (ref 3.6–11)
WBC MORPH BLD: ABNORMAL

## 2023-12-30 PROCEDURE — A4216 STERILE WATER/SALINE, 10 ML: HCPCS | Performed by: INTERNAL MEDICINE

## 2023-12-30 PROCEDURE — 36415 COLL VENOUS BLD VENIPUNCTURE: CPT

## 2023-12-30 PROCEDURE — 80069 RENAL FUNCTION PANEL: CPT

## 2023-12-30 PROCEDURE — 2580000003 HC RX 258: Performed by: INTERNAL MEDICINE

## 2023-12-30 PROCEDURE — 83735 ASSAY OF MAGNESIUM: CPT

## 2023-12-30 PROCEDURE — 6360000002 HC RX W HCPCS: Performed by: HOSPITALIST

## 2023-12-30 PROCEDURE — 2500000003 HC RX 250 WO HCPCS: Performed by: INTERNAL MEDICINE

## 2023-12-30 PROCEDURE — 6370000000 HC RX 637 (ALT 250 FOR IP): Performed by: HOSPITALIST

## 2023-12-30 PROCEDURE — 85025 COMPLETE CBC W/AUTO DIFF WBC: CPT

## 2023-12-30 PROCEDURE — 2580000003 HC RX 258: Performed by: HOSPITALIST

## 2023-12-30 PROCEDURE — 6370000000 HC RX 637 (ALT 250 FOR IP): Performed by: INTERNAL MEDICINE

## 2023-12-30 PROCEDURE — 6360000002 HC RX W HCPCS: Performed by: INTERNAL MEDICINE

## 2023-12-30 PROCEDURE — C9113 INJ PANTOPRAZOLE SODIUM, VIA: HCPCS | Performed by: INTERNAL MEDICINE

## 2023-12-30 PROCEDURE — 1100000000 HC RM PRIVATE

## 2023-12-30 RX ORDER — COSYNTROPIN 0.25 MG/ML
250 INJECTION, POWDER, FOR SOLUTION INTRAMUSCULAR; INTRAVENOUS ONCE
Status: COMPLETED | OUTPATIENT
Start: 2023-12-31 | End: 2023-12-31

## 2023-12-30 RX ORDER — 0.9 % SODIUM CHLORIDE 0.9 %
500 INTRAVENOUS SOLUTION INTRAVENOUS ONCE
Status: COMPLETED | OUTPATIENT
Start: 2023-12-30 | End: 2023-12-30

## 2023-12-30 RX ADMIN — ACETAMINOPHEN 650 MG: 325 TABLET ORAL at 21:31

## 2023-12-30 RX ADMIN — ENOXAPARIN SODIUM 40 MG: 100 INJECTION SUBCUTANEOUS at 18:30

## 2023-12-30 RX ADMIN — MORPHINE SULFATE 60 MG: 30 TABLET, FILM COATED, EXTENDED RELEASE ORAL at 09:53

## 2023-12-30 RX ADMIN — MORPHINE SULFATE 60 MG: 15 TABLET ORAL at 21:31

## 2023-12-30 RX ADMIN — PREDNISONE 5 MG: 5 TABLET ORAL at 09:53

## 2023-12-30 RX ADMIN — SODIUM CHLORIDE, PRESERVATIVE FREE 10 ML: 5 INJECTION INTRAVENOUS at 21:32

## 2023-12-30 RX ADMIN — SODIUM CHLORIDE 500 ML: 9 INJECTION, SOLUTION INTRAVENOUS at 09:50

## 2023-12-30 RX ADMIN — ASCORBIC ACID, VITAMIN A PALMITATE, CHOLECALCIFEROL, THIAMINE HYDROCHLORIDE, RIBOFLAVIN-5 PHOSPHATE SODIUM, PYRIDOXINE HYDROCHLORIDE, NIACINAMIDE, DEXPANTHENOL, ALPHA-TOCOPHEROL ACETATE, VITAMIN K1, FOLIC ACID, BIOTIN, CYANOCOBALAMIN: 200; 3300; 200; 6; 3.6; 6; 40; 15; 10; 150; 600; 60; 5 INJECTION, SOLUTION INTRAVENOUS at 18:24

## 2023-12-30 RX ADMIN — MORPHINE SULFATE 60 MG: 30 TABLET, FILM COATED, EXTENDED RELEASE ORAL at 01:04

## 2023-12-30 RX ADMIN — MORPHINE SULFATE 60 MG: 15 TABLET ORAL at 12:25

## 2023-12-30 RX ADMIN — DRONABINOL 5 MG: 2.5 CAPSULE ORAL at 09:53

## 2023-12-30 RX ADMIN — MORPHINE SULFATE 60 MG: 30 TABLET, FILM COATED, EXTENDED RELEASE ORAL at 16:15

## 2023-12-30 RX ADMIN — SODIUM CHLORIDE, PRESERVATIVE FREE 10 ML: 5 INJECTION INTRAVENOUS at 09:54

## 2023-12-30 RX ADMIN — SODIUM CHLORIDE, PRESERVATIVE FREE 40 MG: 5 INJECTION INTRAVENOUS at 09:53

## 2023-12-30 ASSESSMENT — PAIN DESCRIPTION - ORIENTATION
ORIENTATION: LOWER
ORIENTATION: POSTERIOR
ORIENTATION: POSTERIOR
ORIENTATION: ANTERIOR
ORIENTATION: POSTERIOR

## 2023-12-30 ASSESSMENT — PAIN DESCRIPTION - DESCRIPTORS
DESCRIPTORS: ACHING

## 2023-12-30 ASSESSMENT — PAIN SCALES - GENERAL
PAINLEVEL_OUTOF10: 8
PAINLEVEL_OUTOF10: 10
PAINLEVEL_OUTOF10: 9
PAINLEVEL_OUTOF10: 1
PAINLEVEL_OUTOF10: 6

## 2023-12-30 ASSESSMENT — PAIN DESCRIPTION - LOCATION
LOCATION: BACK;SACRUM
LOCATION: SACRUM;BACK
LOCATION: ABDOMEN
LOCATION: ABDOMEN;BACK
LOCATION: ABDOMEN;BACK

## 2023-12-31 LAB
ALBUMIN SERPL-MCNC: 2.3 G/DL (ref 3.5–5)
ANION GAP SERPL CALC-SCNC: 2 MMOL/L (ref 5–15)
BASOPHILS # BLD: 0 K/UL (ref 0–0.1)
BASOPHILS NFR BLD: 0 % (ref 0–1)
BUN SERPL-MCNC: 38 MG/DL (ref 6–20)
BUN/CREAT SERPL: 50 (ref 12–20)
CALCIUM SERPL-MCNC: 8.6 MG/DL (ref 8.5–10.1)
CHLORIDE SERPL-SCNC: 108 MMOL/L (ref 97–108)
CO2 SERPL-SCNC: 28 MMOL/L (ref 21–32)
CREAT SERPL-MCNC: 0.76 MG/DL (ref 0.55–1.02)
DIFFERENTIAL METHOD BLD: ABNORMAL
EOSINOPHIL # BLD: 0.3 K/UL (ref 0–0.4)
EOSINOPHIL NFR BLD: 5 % (ref 0–7)
ERYTHROCYTE [DISTWIDTH] IN BLOOD BY AUTOMATED COUNT: 12.8 % (ref 11.5–14.5)
GLUCOSE SERPL-MCNC: 143 MG/DL (ref 65–100)
HCT VFR BLD AUTO: 30.8 % (ref 35–47)
HGB BLD-MCNC: 10.4 G/DL (ref 11.5–16)
IMM GRANULOCYTES # BLD AUTO: 0 K/UL
IMM GRANULOCYTES NFR BLD AUTO: 0 %
LYMPHOCYTES # BLD: 2.2 K/UL (ref 0.8–3.5)
LYMPHOCYTES NFR BLD: 37 % (ref 12–49)
MAGNESIUM SERPL-MCNC: 1.5 MG/DL (ref 1.6–2.4)
MCH RBC QN AUTO: 34.6 PG (ref 26–34)
MCHC RBC AUTO-ENTMCNC: 33.8 G/DL (ref 30–36.5)
MCV RBC AUTO: 102.3 FL (ref 80–99)
MONOCYTES # BLD: 0.8 K/UL (ref 0–1)
MONOCYTES NFR BLD: 13 % (ref 5–13)
NEUTS BAND NFR BLD MANUAL: 2 % (ref 0–6)
NEUTS SEG # BLD: 2.6 K/UL (ref 1.8–8)
NEUTS SEG NFR BLD: 43 % (ref 32–75)
NRBC # BLD: 0 K/UL (ref 0–0.01)
NRBC BLD-RTO: 0 PER 100 WBC
PHOSPHATE SERPL-MCNC: 3.2 MG/DL (ref 2.6–4.7)
PLATELET # BLD AUTO: 151 K/UL (ref 150–400)
PMV BLD AUTO: 10.3 FL (ref 8.9–12.9)
POTASSIUM SERPL-SCNC: 4.6 MMOL/L (ref 3.5–5.1)
RBC # BLD AUTO: 3.01 M/UL (ref 3.8–5.2)
RBC MORPH BLD: ABNORMAL
SODIUM SERPL-SCNC: 138 MMOL/L (ref 136–145)
WBC # BLD AUTO: 5.9 K/UL (ref 3.6–11)
WBC MORPH BLD: ABNORMAL

## 2023-12-31 PROCEDURE — 85025 COMPLETE CBC W/AUTO DIFF WBC: CPT

## 2023-12-31 PROCEDURE — A4216 STERILE WATER/SALINE, 10 ML: HCPCS | Performed by: INTERNAL MEDICINE

## 2023-12-31 PROCEDURE — 80069 RENAL FUNCTION PANEL: CPT

## 2023-12-31 PROCEDURE — 97530 THERAPEUTIC ACTIVITIES: CPT

## 2023-12-31 PROCEDURE — 6370000000 HC RX 637 (ALT 250 FOR IP): Performed by: HOSPITALIST

## 2023-12-31 PROCEDURE — 6370000000 HC RX 637 (ALT 250 FOR IP): Performed by: INTERNAL MEDICINE

## 2023-12-31 PROCEDURE — 2500000003 HC RX 250 WO HCPCS: Performed by: INTERNAL MEDICINE

## 2023-12-31 PROCEDURE — 2580000003 HC RX 258: Performed by: HOSPITALIST

## 2023-12-31 PROCEDURE — 6360000002 HC RX W HCPCS: Performed by: INTERNAL MEDICINE

## 2023-12-31 PROCEDURE — 36415 COLL VENOUS BLD VENIPUNCTURE: CPT

## 2023-12-31 PROCEDURE — 83735 ASSAY OF MAGNESIUM: CPT

## 2023-12-31 PROCEDURE — 2580000003 HC RX 258: Performed by: INTERNAL MEDICINE

## 2023-12-31 PROCEDURE — 1100000000 HC RM PRIVATE

## 2023-12-31 PROCEDURE — 94761 N-INVAS EAR/PLS OXIMETRY MLT: CPT

## 2023-12-31 PROCEDURE — 97116 GAIT TRAINING THERAPY: CPT

## 2023-12-31 PROCEDURE — C9113 INJ PANTOPRAZOLE SODIUM, VIA: HCPCS | Performed by: INTERNAL MEDICINE

## 2023-12-31 PROCEDURE — 6360000002 HC RX W HCPCS: Performed by: HOSPITALIST

## 2023-12-31 RX ORDER — MAGNESIUM SULFATE HEPTAHYDRATE 40 MG/ML
2000 INJECTION, SOLUTION INTRAVENOUS ONCE
Status: COMPLETED | OUTPATIENT
Start: 2023-12-31 | End: 2023-12-31

## 2023-12-31 RX ADMIN — MORPHINE SULFATE 60 MG: 30 TABLET, FILM COATED, EXTENDED RELEASE ORAL at 16:52

## 2023-12-31 RX ADMIN — SODIUM CHLORIDE, PRESERVATIVE FREE 40 MG: 5 INJECTION INTRAVENOUS at 09:31

## 2023-12-31 RX ADMIN — PREDNISONE 5 MG: 5 TABLET ORAL at 09:31

## 2023-12-31 RX ADMIN — MORPHINE SULFATE 60 MG: 15 TABLET ORAL at 03:39

## 2023-12-31 RX ADMIN — COSYNTROPIN 250 MCG: 0.25 INJECTION, POWDER, LYOPHILIZED, FOR SOLUTION INTRAVENOUS at 09:32

## 2023-12-31 RX ADMIN — MORPHINE SULFATE 60 MG: 30 TABLET, FILM COATED, EXTENDED RELEASE ORAL at 01:13

## 2023-12-31 RX ADMIN — SODIUM CHLORIDE, PRESERVATIVE FREE 10 ML: 5 INJECTION INTRAVENOUS at 22:45

## 2023-12-31 RX ADMIN — MAGNESIUM SULFATE HEPTAHYDRATE 2000 MG: 40 INJECTION, SOLUTION INTRAVENOUS at 09:45

## 2023-12-31 RX ADMIN — CYCLOBENZAPRINE 10 MG: 10 TABLET, FILM COATED ORAL at 15:35

## 2023-12-31 RX ADMIN — MORPHINE SULFATE 60 MG: 15 TABLET ORAL at 20:25

## 2023-12-31 RX ADMIN — MORPHINE SULFATE 60 MG: 15 TABLET ORAL at 14:02

## 2023-12-31 RX ADMIN — ASCORBIC ACID, VITAMIN A PALMITATE, CHOLECALCIFEROL, THIAMINE HYDROCHLORIDE, RIBOFLAVIN-5 PHOSPHATE SODIUM, PYRIDOXINE HYDROCHLORIDE, NIACINAMIDE, DEXPANTHENOL, ALPHA-TOCOPHEROL ACETATE, VITAMIN K1, FOLIC ACID, BIOTIN, CYANOCOBALAMIN: 200; 3300; 200; 6; 3.6; 6; 40; 15; 10; 150; 600; 60; 5 INJECTION, SOLUTION INTRAVENOUS at 16:58

## 2023-12-31 RX ADMIN — ENOXAPARIN SODIUM 40 MG: 100 INJECTION SUBCUTANEOUS at 16:52

## 2023-12-31 RX ADMIN — MORPHINE SULFATE 60 MG: 30 TABLET, FILM COATED, EXTENDED RELEASE ORAL at 09:31

## 2023-12-31 RX ADMIN — SODIUM CHLORIDE, PRESERVATIVE FREE 10 ML: 5 INJECTION INTRAVENOUS at 09:37

## 2023-12-31 RX ADMIN — DRONABINOL 5 MG: 2.5 CAPSULE ORAL at 09:31

## 2023-12-31 ASSESSMENT — PAIN DESCRIPTION - ORIENTATION
ORIENTATION: POSTERIOR
ORIENTATION: ANTERIOR
ORIENTATION: POSTERIOR
ORIENTATION: LOWER
ORIENTATION: LOWER
ORIENTATION: POSTERIOR

## 2023-12-31 ASSESSMENT — PAIN SCALES - GENERAL
PAINLEVEL_OUTOF10: 7
PAINLEVEL_OUTOF10: 7
PAINLEVEL_OUTOF10: 8
PAINLEVEL_OUTOF10: 5
PAINLEVEL_OUTOF10: 10
PAINLEVEL_OUTOF10: 7

## 2023-12-31 ASSESSMENT — PAIN DESCRIPTION - LOCATION
LOCATION: BACK;SACRUM
LOCATION: BACK;SACRUM
LOCATION: ABDOMEN
LOCATION: ABDOMEN;BACK
LOCATION: BACK;SACRUM

## 2023-12-31 ASSESSMENT — PAIN DESCRIPTION - DESCRIPTORS
DESCRIPTORS: ACHING

## 2024-01-01 LAB
ANION GAP SERPL CALC-SCNC: 3 MMOL/L (ref 5–15)
BUN SERPL-MCNC: 35 MG/DL (ref 6–20)
BUN/CREAT SERPL: 48 (ref 12–20)
CALCIUM SERPL-MCNC: 8.8 MG/DL (ref 8.5–10.1)
CHLORIDE SERPL-SCNC: 108 MMOL/L (ref 97–108)
CO2 SERPL-SCNC: 28 MMOL/L (ref 21–32)
CREAT SERPL-MCNC: 0.73 MG/DL (ref 0.55–1.02)
GLUCOSE SERPL-MCNC: 148 MG/DL (ref 65–100)
PHOSPHATE SERPL-MCNC: 3.6 MG/DL (ref 2.6–4.7)
POTASSIUM SERPL-SCNC: 4.6 MMOL/L (ref 3.5–5.1)
SODIUM SERPL-SCNC: 139 MMOL/L (ref 136–145)

## 2024-01-01 PROCEDURE — A4216 STERILE WATER/SALINE, 10 ML: HCPCS | Performed by: INTERNAL MEDICINE

## 2024-01-01 PROCEDURE — C9113 INJ PANTOPRAZOLE SODIUM, VIA: HCPCS | Performed by: INTERNAL MEDICINE

## 2024-01-01 PROCEDURE — 1100000000 HC RM PRIVATE

## 2024-01-01 PROCEDURE — 2580000003 HC RX 258: Performed by: HOSPITALIST

## 2024-01-01 PROCEDURE — 94761 N-INVAS EAR/PLS OXIMETRY MLT: CPT

## 2024-01-01 PROCEDURE — 6360000002 HC RX W HCPCS: Performed by: HOSPITALIST

## 2024-01-01 PROCEDURE — 6360000002 HC RX W HCPCS: Performed by: INTERNAL MEDICINE

## 2024-01-01 PROCEDURE — 2500000003 HC RX 250 WO HCPCS: Performed by: INTERNAL MEDICINE

## 2024-01-01 PROCEDURE — 6370000000 HC RX 637 (ALT 250 FOR IP): Performed by: INTERNAL MEDICINE

## 2024-01-01 PROCEDURE — 80048 BASIC METABOLIC PNL TOTAL CA: CPT

## 2024-01-01 PROCEDURE — 2580000003 HC RX 258: Performed by: INTERNAL MEDICINE

## 2024-01-01 PROCEDURE — 36415 COLL VENOUS BLD VENIPUNCTURE: CPT

## 2024-01-01 PROCEDURE — 84100 ASSAY OF PHOSPHORUS: CPT

## 2024-01-01 PROCEDURE — 6370000000 HC RX 637 (ALT 250 FOR IP): Performed by: HOSPITALIST

## 2024-01-01 RX ORDER — POLYETHYLENE GLYCOL 3350 17 G/17G
17 POWDER, FOR SOLUTION ORAL DAILY PRN
Qty: 20 G | Refills: 1 | Status: SHIPPED
Start: 2024-01-01 | End: 2024-01-31

## 2024-01-01 RX ORDER — PREDNISONE 5 MG/1
5 TABLET ORAL DAILY
Qty: 10 TABLET | Refills: 0 | Status: SHIPPED | OUTPATIENT
Start: 2024-01-02 | End: 2024-01-12

## 2024-01-01 RX ORDER — MIDODRINE HYDROCHLORIDE 5 MG/1
5 TABLET ORAL
Qty: 90 TABLET | Refills: 3 | Status: SHIPPED | OUTPATIENT
Start: 2024-01-01

## 2024-01-01 RX ORDER — MORPHINE SULFATE 60 MG/1
60 TABLET, FILM COATED, EXTENDED RELEASE ORAL EVERY 8 HOURS
Qty: 20 TABLET | Refills: 0 | Status: SHIPPED | OUTPATIENT
Start: 2024-01-01 | End: 2024-01-31

## 2024-01-01 RX ORDER — MORPHINE SULFATE 30 MG/1
30 TABLET ORAL EVERY 6 HOURS PRN
Qty: 20 TABLET | Refills: 0 | Status: SHIPPED | OUTPATIENT
Start: 2024-01-01 | End: 2024-01-04

## 2024-01-01 RX ORDER — CYCLOBENZAPRINE HCL 10 MG
10 TABLET ORAL 3 TIMES DAILY PRN
Qty: 12 TABLET | Refills: 0 | Status: SHIPPED | OUTPATIENT
Start: 2024-01-01 | End: 2024-01-11

## 2024-01-01 RX ORDER — SODIUM CHLORIDE 9 MG/ML
INJECTION, SOLUTION INTRAVENOUS CONTINUOUS
Status: DISCONTINUED | OUTPATIENT
Start: 2024-01-01 | End: 2024-01-02 | Stop reason: HOSPADM

## 2024-01-01 RX ORDER — MIDODRINE HYDROCHLORIDE 5 MG/1
5 TABLET ORAL
Status: DISCONTINUED | OUTPATIENT
Start: 2024-01-01 | End: 2024-01-02 | Stop reason: HOSPADM

## 2024-01-01 RX ADMIN — I.V. FAT EMULSION 250 ML: 20 EMULSION INTRAVENOUS at 18:56

## 2024-01-01 RX ADMIN — SODIUM CHLORIDE: 9 INJECTION, SOLUTION INTRAVENOUS at 08:56

## 2024-01-01 RX ADMIN — MORPHINE SULFATE 60 MG: 15 TABLET ORAL at 14:22

## 2024-01-01 RX ADMIN — MORPHINE SULFATE 60 MG: 30 TABLET, FILM COATED, EXTENDED RELEASE ORAL at 16:46

## 2024-01-01 RX ADMIN — PREDNISONE 5 MG: 5 TABLET ORAL at 08:55

## 2024-01-01 RX ADMIN — MIDODRINE HYDROCHLORIDE 5 MG: 5 TABLET ORAL at 18:03

## 2024-01-01 RX ADMIN — ASCORBIC ACID, VITAMIN A PALMITATE, CHOLECALCIFEROL, THIAMINE HYDROCHLORIDE, RIBOFLAVIN-5 PHOSPHATE SODIUM, PYRIDOXINE HYDROCHLORIDE, NIACINAMIDE, DEXPANTHENOL, ALPHA-TOCOPHEROL ACETATE, VITAMIN K1, FOLIC ACID, BIOTIN, CYANOCOBALAMIN: 200; 3300; 200; 6; 3.6; 6; 40; 15; 10; 150; 600; 60; 5 INJECTION, SOLUTION INTRAVENOUS at 18:55

## 2024-01-01 RX ADMIN — SODIUM CHLORIDE: 9 INJECTION, SOLUTION INTRAVENOUS at 22:36

## 2024-01-01 RX ADMIN — SODIUM CHLORIDE, PRESERVATIVE FREE 10 ML: 5 INJECTION INTRAVENOUS at 20:37

## 2024-01-01 RX ADMIN — SODIUM CHLORIDE, PRESERVATIVE FREE 40 MG: 5 INJECTION INTRAVENOUS at 08:56

## 2024-01-01 RX ADMIN — CYCLOBENZAPRINE 10 MG: 10 TABLET, FILM COATED ORAL at 05:07

## 2024-01-01 RX ADMIN — MIDODRINE HYDROCHLORIDE 5 MG: 5 TABLET ORAL at 12:15

## 2024-01-01 RX ADMIN — MIDODRINE HYDROCHLORIDE 5 MG: 5 TABLET ORAL at 08:55

## 2024-01-01 RX ADMIN — DRONABINOL 5 MG: 2.5 CAPSULE ORAL at 08:54

## 2024-01-01 RX ADMIN — MORPHINE SULFATE 60 MG: 15 TABLET ORAL at 20:36

## 2024-01-01 RX ADMIN — MORPHINE SULFATE 60 MG: 15 TABLET ORAL at 07:50

## 2024-01-01 RX ADMIN — ENOXAPARIN SODIUM 40 MG: 100 INJECTION SUBCUTANEOUS at 16:46

## 2024-01-01 RX ADMIN — SODIUM CHLORIDE, PRESERVATIVE FREE 10 ML: 5 INJECTION INTRAVENOUS at 09:07

## 2024-01-01 RX ADMIN — MORPHINE SULFATE 60 MG: 30 TABLET, FILM COATED, EXTENDED RELEASE ORAL at 08:55

## 2024-01-01 ASSESSMENT — PAIN DESCRIPTION - LOCATION
LOCATION: SACRUM
LOCATION: BACK
LOCATION: BACK
LOCATION: SACRUM
LOCATION: BACK

## 2024-01-01 ASSESSMENT — PAIN SCALES - GENERAL
PAINLEVEL_OUTOF10: 4
PAINLEVEL_OUTOF10: 2
PAINLEVEL_OUTOF10: 8
PAINLEVEL_OUTOF10: 6
PAINLEVEL_OUTOF10: 7
PAINLEVEL_OUTOF10: 6
PAINLEVEL_OUTOF10: 6

## 2024-01-01 ASSESSMENT — PAIN DESCRIPTION - DESCRIPTORS
DESCRIPTORS: ACHING
DESCRIPTORS: DULL
DESCRIPTORS: ACHING;SHARP
DESCRIPTORS: ACHING;DISCOMFORT
DESCRIPTORS: ACHING

## 2024-01-01 ASSESSMENT — PAIN DESCRIPTION - ORIENTATION
ORIENTATION: LOWER
ORIENTATION: LOWER
ORIENTATION: LOWER;UPPER

## 2024-01-01 ASSESSMENT — PAIN DESCRIPTION - PAIN TYPE: TYPE: CHRONIC PAIN;ACUTE PAIN

## 2024-01-01 ASSESSMENT — PAIN DESCRIPTION - FREQUENCY: FREQUENCY: CONTINUOUS

## 2024-01-01 ASSESSMENT — PAIN DESCRIPTION - ONSET: ONSET: ON-GOING

## 2024-01-01 NOTE — DISCHARGE INSTRUCTIONS
ACUTE DIAGNOSES:  Intractable pain [R52]  Bilateral sacral insufficiency fracture, initial encounter [M84.48XA]  Sacral insufficiency fracture with delayed healing [M84.48XG]    CHRONIC MEDICAL DIAGNOSES:  [unfilled]    DISCHARGE MEDICATIONS:   [unfilled]    It is important that you take the medication exactly as they are prescribed.   Keep your medication in the bottles provided by the pharmacist and keep a list of the medication names, dosages, and times to be taken in your wallet.   Do not take other medications without consulting your doctor.       DIET:  regular diet    ACTIVITY: activity as tolerated    ADDITIONAL INFORMATION: If you experience any of the following symptoms then please call your primary care physician or return to the emergency room if you cannot get hold of your doctor: Fever, chills, nausea, vomiting, diarrhea, change in mentation, falling, bleeding, shortness of breath.    FOLLOW UP CARE:   @PCP@  you are to call and set up an appointment to see them in 5 days.    Follow-up  No follow-up provider specified.      Information obtained by :  I understand that if any problems occur once I am at home I am to contact my physician.    I understand and acknowledge receipt of the instructions indicated above.                                                                                                                                           Physician's or R.N.'s Signature                                                                  Date/Time                                                                                                                                              Patient or Representative Signature                                                          Date/Time

## 2024-01-01 NOTE — DISCHARGE SUMMARY
Hospitalist Discharge Summary     Patient ID:    Cindy Ortega  [id number]  48 y.o.  1975    Admit date of service: 12/25/2023    Discharge date of service: 1/1/2024    Admission Diagnoses: Intractable pain [R52]  Bilateral sacral insufficiency fracture, initial encounter [M84.48XA]  Sacral insufficiency fracture with delayed healing [M84.48XG]    Chronic Diagnoses:      Discharge Medications:   Current Discharge Medication List        START taking these medications    Details   predniSONE (DELTASONE) 5 MG tablet Take 1 tablet by mouth daily for 10 days  Qty: 10 tablet, Refills: 0      polyethylene glycol (GLYCOLAX) 17 g packet Take 1 packet by mouth daily as needed for Constipation  Qty: 20 g, Refills: 1      fat emulsion (INTRALIPID/NUTRILIPID) 20 % EMUL infusion Infuse 250 mLs intravenously Twice a Week  Qty: 500 mL, Refills: 0      midodrine (PROAMATINE) 5 MG tablet Take 1 tablet by mouth 3 times daily (with meals)  Qty: 90 tablet, Refills: 3      cyclobenzaprine (FLEXERIL) 10 MG tablet Take 1 tablet by mouth 3 times daily as needed for Muscle spasms  Qty: 12 tablet, Refills: 0           CONTINUE these medications which have CHANGED    Details   morphine (MS CONTIN) 60 MG extended release tablet Take 1 tablet by mouth every 8 (eight) hours for 30 days. Max Daily Amount: 180 mg  Qty: 20 tablet, Refills: 0    Comments: Reduce doses taken as pain becomes manageable  Associated Diagnoses: Sacral insufficiency fracture with delayed healing      morphine (MSIR) 30 MG tablet Take 1 tablet by mouth every 6 hours as needed for Pain for up to 3 days. Max Daily Amount: 120 mg  Qty: 20 tablet, Refills: 0    Comments: Reduce doses taken as pain becomes manageable  Associated Diagnoses: Sacral insufficiency fracture with delayed healing           CONTINUE these medications which have NOT CHANGED    Details   pantoprazole (PROTONIX) 40 MG injection Infuse 40 mg intravenously daily      b complex vitamins capsule Take 1

## 2024-01-01 NOTE — CARE COORDINATION
1/1/2023   CARE MANAGEMENT NOTE:  (holiday coverage).  OWEN received call from [facility] liaison (Kavon) stating that [employer] insurance auth has been obtained.  CM called [employer] transport ([phone]) to schedule a stretcher however office is closed today secondary to New Year's holiday.   OWEN notified SURAJ and MD. Navarro

## 2024-01-02 VITALS
HEIGHT: 62 IN | BODY MASS INDEX: 36.44 KG/M2 | TEMPERATURE: 98.2 F | OXYGEN SATURATION: 100 % | SYSTOLIC BLOOD PRESSURE: 115 MMHG | DIASTOLIC BLOOD PRESSURE: 48 MMHG | RESPIRATION RATE: 17 BRPM | WEIGHT: 198 LBS | HEART RATE: 82 BPM

## 2024-01-02 LAB
ANION GAP SERPL CALC-SCNC: 3 MMOL/L (ref 5–15)
BUN SERPL-MCNC: 37 MG/DL (ref 6–20)
BUN/CREAT SERPL: 60 (ref 12–20)
CALCIUM SERPL-MCNC: 8.1 MG/DL (ref 8.5–10.1)
CHLORIDE SERPL-SCNC: 110 MMOL/L (ref 97–108)
CO2 SERPL-SCNC: 25 MMOL/L (ref 21–32)
COMMENT:: NORMAL
CREAT SERPL-MCNC: 0.62 MG/DL (ref 0.55–1.02)
GLUCOSE SERPL-MCNC: 109 MG/DL (ref 65–100)
PHOSPHATE SERPL-MCNC: 3.2 MG/DL (ref 2.6–4.7)
POTASSIUM SERPL-SCNC: 4.2 MMOL/L (ref 3.5–5.1)
SODIUM SERPL-SCNC: 138 MMOL/L (ref 136–145)
SPECIMEN HOLD: NORMAL

## 2024-01-02 PROCEDURE — 80048 BASIC METABOLIC PNL TOTAL CA: CPT

## 2024-01-02 PROCEDURE — 2580000003 HC RX 258: Performed by: HOSPITALIST

## 2024-01-02 PROCEDURE — 6370000000 HC RX 637 (ALT 250 FOR IP): Performed by: HOSPITALIST

## 2024-01-02 PROCEDURE — C9113 INJ PANTOPRAZOLE SODIUM, VIA: HCPCS | Performed by: INTERNAL MEDICINE

## 2024-01-02 PROCEDURE — 6370000000 HC RX 637 (ALT 250 FOR IP): Performed by: INTERNAL MEDICINE

## 2024-01-02 PROCEDURE — 84100 ASSAY OF PHOSPHORUS: CPT

## 2024-01-02 PROCEDURE — 6360000002 HC RX W HCPCS: Performed by: INTERNAL MEDICINE

## 2024-01-02 PROCEDURE — 36415 COLL VENOUS BLD VENIPUNCTURE: CPT

## 2024-01-02 PROCEDURE — 2580000003 HC RX 258: Performed by: INTERNAL MEDICINE

## 2024-01-02 RX ADMIN — MORPHINE SULFATE 60 MG: 30 TABLET, FILM COATED, EXTENDED RELEASE ORAL at 00:35

## 2024-01-02 RX ADMIN — DRONABINOL 5 MG: 2.5 CAPSULE ORAL at 10:33

## 2024-01-02 RX ADMIN — CYCLOBENZAPRINE 10 MG: 10 TABLET, FILM COATED ORAL at 14:44

## 2024-01-02 RX ADMIN — SODIUM CHLORIDE, PRESERVATIVE FREE 10 ML: 5 INJECTION INTRAVENOUS at 10:34

## 2024-01-02 RX ADMIN — MORPHINE SULFATE 60 MG: 30 TABLET, FILM COATED, EXTENDED RELEASE ORAL at 10:33

## 2024-01-02 RX ADMIN — MIDODRINE HYDROCHLORIDE 5 MG: 5 TABLET ORAL at 10:33

## 2024-01-02 RX ADMIN — SODIUM CHLORIDE, PRESERVATIVE FREE 40 MG: 5 INJECTION INTRAVENOUS at 10:33

## 2024-01-02 RX ADMIN — PREDNISONE 5 MG: 5 TABLET ORAL at 10:33

## 2024-01-02 RX ADMIN — MORPHINE SULFATE 60 MG: 15 TABLET ORAL at 12:51

## 2024-01-02 RX ADMIN — MORPHINE SULFATE 60 MG: 15 TABLET ORAL at 02:02

## 2024-01-02 RX ADMIN — CYCLOBENZAPRINE 10 MG: 10 TABLET, FILM COATED ORAL at 10:51

## 2024-01-02 RX ADMIN — MIDODRINE HYDROCHLORIDE 5 MG: 5 TABLET ORAL at 12:51

## 2024-01-02 RX ADMIN — CYCLOBENZAPRINE 10 MG: 10 TABLET, FILM COATED ORAL at 00:40

## 2024-01-02 ASSESSMENT — PAIN DESCRIPTION - LOCATION
LOCATION: ABDOMEN;SACRUM
LOCATION: SACRUM;BACK

## 2024-01-02 ASSESSMENT — PAIN SCALES - GENERAL
PAINLEVEL_OUTOF10: 7
PAINLEVEL_OUTOF10: 5
PAINLEVEL_OUTOF10: 8
PAINLEVEL_OUTOF10: 6
PAINLEVEL_OUTOF10: 9
PAINLEVEL_OUTOF10: 4

## 2024-01-02 ASSESSMENT — PAIN DESCRIPTION - ONSET: ONSET: ON-GOING

## 2024-01-02 ASSESSMENT — PAIN DESCRIPTION - ORIENTATION: ORIENTATION: RIGHT;LEFT

## 2024-01-02 ASSESSMENT — PAIN DESCRIPTION - FREQUENCY: FREQUENCY: CONTINUOUS

## 2024-01-02 ASSESSMENT — PAIN DESCRIPTION - DESCRIPTORS: DESCRIPTORS: DISCOMFORT

## 2024-01-02 NOTE — PLAN OF CARE
Problem: Chronic Conditions and Co-morbidities  Goal: Patient's chronic conditions and co-morbidity symptoms are monitored and maintained or improved  Outcome: Progressing  Flowsheets (Taken 1/1/2024 1918)  Care Plan - Patient's Chronic Conditions and Co-Morbidity Symptoms are Monitored and Maintained or Improved:   Monitor and assess patient's chronic conditions and comorbid symptoms for stability, deterioration, or improvement   Collaborate with multidisciplinary team to address chronic and comorbid conditions and prevent exacerbation or deterioration     
  Problem: Discharge Planning  Goal: Discharge to home or other facility with appropriate resources  12/30/2023 1235 by Tamy Vieyra RN  Outcome: /[facility] Progressing  12/30/2023 0554 by Steven Levine LPN  Outcome: Progressing     Problem: Pain  Goal: Verbalizes/displays adequate comfort level or baseline comfort level  12/30/2023 1235 by Tamy Vieyra RN  Outcome: /[facility] Progressing  12/30/2023 0554 by Steven Levine LPN  Outcome: Progressing     Problem: Skin/Tissue Integrity  Goal: Absence of new skin breakdown  Description: 1.  Monitor for areas of redness and/or skin breakdown  2.  Assess vascular access sites hourly  3.  Every 4-6 hours minimum:  Change oxygen saturation probe site  4.  Every 4-6 hours:  If on nasal continuous positive airway pressure, respiratory therapy assess nares and determine need for appliance change or resting period.  12/30/2023 1235 by Tamy Vieyra RN  Outcome: /[facility] Progressing  12/30/2023 0554 by Steven Levine LPN  Outcome: Progressing     Problem: Chronic Conditions and Co-morbidities  Goal: Patient's chronic conditions and co-morbidity symptoms are monitored and maintained or improved  12/30/2023 1235 by Tamy Vieyra RN  Outcome: /[facility] Progressing  12/30/2023 0554 by Steven Levine LPN  Outcome: Progressing     Problem: Nutrition Deficit:  Goal: Optimize nutritional status  12/30/2023 1235 by Tamy Vieyra RN  Outcome: /[facility] Progressing  12/30/2023 0554 by Steven Levine LPN  Outcome: Progressing     Problem: Safety - Adult  Goal: Free from fall injury  12/30/2023 1235 by Tamy Vieyra RN  Outcome: /[facility] Progressing  12/30/2023 0554 by Steven Levine LPN  Outcome: Progressing     
  Problem: Discharge Planning  Goal: Discharge to home or other facility with appropriate resources  12/31/2023 1258 by Tamy Vieyra RN  Outcome: /[facility] Progressing  12/31/2023 0727 by Steven Levine LPN  Outcome: Progressing     Problem: Pain  Goal: Verbalizes/displays adequate comfort level or baseline comfort level  12/31/2023 1258 by Tamy Vieyra RN  Outcome: /[facility] Progressing  12/31/2023 0727 by Steven Levine LPN  Outcome: Progressing     Problem: Skin/Tissue Integrity  Goal: Absence of new skin breakdown  Description: 1.  Monitor for areas of redness and/or skin breakdown  2.  Assess vascular access sites hourly  3.  Every 4-6 hours minimum:  Change oxygen saturation probe site  4.  Every 4-6 hours:  If on nasal continuous positive airway pressure, respiratory therapy assess nares and determine need for appliance change or resting period.  12/31/2023 1258 by Tamy Vieyra RN  Outcome: /[facility] Progressing  12/31/2023 0727 by Steven Levine LPN  Outcome: Progressing     Problem: Chronic Conditions and Co-morbidities  Goal: Patient's chronic conditions and co-morbidity symptoms are monitored and maintained or improved  12/31/2023 1258 by Tamy Vieyra RN  Outcome: /[facility] Progressing  12/31/2023 0727 by Steven Levine LPN  Outcome: Progressing     Problem: Nutrition Deficit:  Goal: Optimize nutritional status  12/31/2023 1258 by Tamy Vieyra RN  Outcome: /[facility] Progressing  12/31/2023 0727 by Steven Levine LPN  Outcome: Progressing     Problem: Safety - Adult  Goal: Free from fall injury  12/31/2023 1258 by Tamy Vieyra RN  Outcome: /[facility] Progressing  12/31/2023 0727 by Steven Levine LPN  Outcome: Progressing     
  Problem: Discharge Planning  Goal: Discharge to home or other facility with appropriate resources  Outcome: Progressing     Problem: Pain  Goal: Verbalizes/displays adequate comfort level or baseline comfort level  Outcome: Progressing     Problem: Skin/Tissue Integrity  Goal: Absence of new skin breakdown  Description: 1.  Monitor for areas of redness and/or skin breakdown  2.  Assess vascular access sites hourly  3.  Every 4-6 hours minimum:  Change oxygen saturation probe site  4.  Every 4-6 hours:  If on nasal continuous positive airway pressure, respiratory therapy assess nares and determine need for appliance change or resting period.  Outcome: Progressing     Problem: Chronic Conditions and Co-morbidities  Goal: Patient's chronic conditions and co-morbidity symptoms are monitored and maintained or improved  Outcome: Progressing     
  Problem: Discharge Planning  Goal: Discharge to home or other facility with appropriate resources  Outcome: Progressing     Problem: Pain  Goal: Verbalizes/displays adequate comfort level or baseline comfort level  Outcome: Progressing     Problem: Skin/Tissue Integrity  Goal: Absence of new skin breakdown  Description: 1.  Monitor for areas of redness and/or skin breakdown  2.  Assess vascular access sites hourly  3.  Every 4-6 hours minimum:  Change oxygen saturation probe site  4.  Every 4-6 hours:  If on nasal continuous positive airway pressure, respiratory therapy assess nares and determine need for appliance change or resting period.  Outcome: Progressing     Problem: Chronic Conditions and Co-morbidities  Goal: Patient's chronic conditions and co-morbidity symptoms are monitored and maintained or improved  Outcome: Progressing     Problem: Nutrition Deficit:  Goal: Optimize nutritional status  Outcome: Progressing     Problem: Safety - Adult  Goal: Free from fall injury  Outcome: Progressing     
  Problem: Discharge Planning  Goal: Discharge to home or other facility with appropriate resources  Outcome: Progressing     Problem: Pain  Goal: Verbalizes/displays adequate comfort level or baseline comfort level  Outcome: Progressing     Problem: Skin/Tissue Integrity  Goal: Absence of new skin breakdown  Description: 1.  Monitor for areas of redness and/or skin breakdown  2.  Assess vascular access sites hourly  3.  Every 4-6 hours minimum:  Change oxygen saturation probe site  4.  Every 4-6 hours:  If on nasal continuous positive airway pressure, respiratory therapy assess nares and determine need for appliance change or resting period.  Outcome: Progressing     Problem: Physical Therapy - Adult  Goal: By Discharge: Performs mobility at highest level of function for planned discharge setting.  See evaluation for individualized goals.  Description: FUNCTIONAL STATUS PRIOR TO ADMISSION: Patient was independent and active without use of DME.    HOME SUPPORT PRIOR TO ADMISSION: The patient lived alone with family to provide assistance.    Physical Therapy Goals  Initiated 12/26/2023  1.  Patient will move from supine to sit and sit to supine, scoot up and down, and roll side to side in bed with modified independence within 7 day(s).    2.  Patient will perform sit to stand with modified independence within 7 day(s).  3.  Patient will transfer from bed to chair and chair to bed with modified independence using the least restrictive device within 7 day(s).  4.  Patient will ambulate with modified independence for 200 feet with the least restrictive device within 7 day(s).     12/29/2023 1615 by Imani Elam, PTA  Outcome: Progressing     Problem: Chronic Conditions and Co-morbidities  Goal: Patient's chronic conditions and co-morbidity symptoms are monitored and maintained or improved  Outcome: Progressing     Problem: Nutrition Deficit:  Goal: Optimize nutritional status  Outcome: Progressing     Problem: 
  Problem: Discharge Planning  Goal: Discharge to home or other facility with appropriate resources  Outcome: Progressing     Problem: Pain  Goal: Verbalizes/displays adequate comfort level or baseline comfort level  Outcome: Progressing     Problem: Skin/Tissue Integrity  Goal: Absence of new skin breakdown  Description: 1.  Monitor for areas of redness and/or skin breakdown  2.  Assess vascular access sites hourly  3.  Every 4-6 hours minimum:  Change oxygen saturation probe site  4.  Every 4-6 hours:  If on nasal continuous positive airway pressure, respiratory therapy assess nares and determine need for appliance change or resting period.  Outcome: Progressing     Problem: Physical Therapy - Adult  Goal: By Discharge: Performs mobility at highest level of function for planned discharge setting.  See evaluation for individualized goals.  Description: FUNCTIONAL STATUS PRIOR TO ADMISSION: Patient was independent and active without use of DME.    HOME SUPPORT PRIOR TO ADMISSION: The patient lived alone with family to provide assistance.    Physical Therapy Goals  Initiated 12/26/2023  1.  Patient will move from supine to sit and sit to supine, scoot up and down, and roll side to side in bed with modified independence within 7 day(s).    2.  Patient will perform sit to stand with modified independence within 7 day(s).  3.  Patient will transfer from bed to chair and chair to bed with modified independence using the least restrictive device within 7 day(s).  4.  Patient will ambulate with modified independence for 200 feet with the least restrictive device within 7 day(s).     12/31/2023 1455 by Long Redmond, PT  Outcome: Progressing     Problem: Chronic Conditions and Co-morbidities  Goal: Patient's chronic conditions and co-morbidity symptoms are monitored and maintained or improved  Outcome: Progressing     Problem: Nutrition Deficit:  Goal: Optimize nutritional status  Outcome: Progressing     Problem: Safety - 
  Problem: Occupational Therapy - Adult  Goal: By Discharge: Performs self-care activities at highest level of function for planned discharge setting.  See evaluation for individualized goals.  Description: FUNCTIONAL STATUS PRIOR TO ADMISSION:  Patient is independent/MI for self care and functional transfers/mobility with occasional use of SPC prior to fall. Per pt report, pt has home health aid Sunday - Thursday from 10am-6pm.     HOME SUPPORT: Patient lived alone with home health caregiver to provide assistance.    Occupational Therapy Goals:  Initiated 12/26/2023  1.  Patient will perform grooming with Modified [place] within 7 day(s).  2.  Patient will perform upper body dressing with Modified [place] within 7 day(s).  3.  Patient will perform lower body dressing with Modified [place] within 7 day(s).  4.  Patient will perform BSC progress to standard toilet transfers with Modified [place]  within 7 day(s).  5.  Patient will perform all aspects of toileting with Modified [place] within 7 day(s).  6.  Patient will participate in upper extremity therapeutic exercise/activities with Modified [place] for 10 minutes within 7 day(s).    7.  Patient will utilize energy conservation techniques during functional activities with verbal cues within 7 day(s).    Outcome: Progressing   OCCUPATIONAL THERAPY TREATMENT  Patient: Cindy Ortega (48 y.o. female)  Date: 12/29/2023  Primary Diagnosis: Intractable pain [R52]  Bilateral sacral insufficiency fracture, initial encounter [M84.48XA]  Sacral insufficiency fracture with delayed healing [M84.48XG]       Precautions: Weight Bearing   Left Lower Extremity Weight Bearing: Weight Bearing As Tolerated (with Cam Walker boot)            Chart, occupational therapy assessment, plan of care, and goals were reviewed.    ASSESSMENT  Patient continues to benefit from skilled OT services and is progressing towards goals. Pt sat up edge of bed, stated her 
  Problem: Pain  Goal: Verbalizes/displays adequate comfort level or baseline comfort level  Outcome: Progressing     Problem: Skin/Tissue Integrity  Goal: Absence of new skin breakdown  Description: 1.  Monitor for areas of redness and/or skin breakdown  2.  Assess vascular access sites hourly  3.  Every 4-6 hours minimum:  Change oxygen saturation probe site  4.  Every 4-6 hours:  If on nasal continuous positive airway pressure, respiratory therapy assess nares and determine need for appliance change or resting period.  Outcome: Progressing     
  Problem: Physical Therapy - Adult  Goal: By Discharge: Performs mobility at highest level of function for planned discharge setting.  See evaluation for individualized goals.  Description: FUNCTIONAL STATUS PRIOR TO ADMISSION: Patient was independent and active without use of DME.    HOME SUPPORT PRIOR TO ADMISSION: The patient lived alone with family to provide assistance.    Physical Therapy Goals  Initiated 12/26/2023  1.  Patient will move from supine to sit and sit to supine, scoot up and down, and roll side to side in bed with modified independence within 7 day(s).    2.  Patient will perform sit to stand with modified independence within 7 day(s).  3.  Patient will transfer from bed to chair and chair to bed with modified independence using the least restrictive device within 7 day(s).  4.  Patient will ambulate with modified independence for 200 feet with the least restrictive device within 7 day(s).     Outcome: Progressing   PHYSICAL THERAPY EVALUATION    Patient: Cindy Ortega (48 y.o. female)  Date: 12/26/2023  Primary Diagnosis: Intractable pain [R52]  Bilateral sacral insufficiency fracture, initial encounter [M84.48XA]  Sacral insufficiency fracture with delayed healing [M84.48XG]       Precautions:                      ASSESSMENT :   DEFICITS/IMPAIRMENTS:   The patient is limited by decreased functional mobility, independence in ADLs, ROM, strength, body mechanics, activity tolerance, endurance, safety awareness, coordination, balance, increased pain levels     Based on the impairments listed above patient presents with difficulty with ambulation due to  GLF at home. Communicated with nurse cleared for therapy and has been medicated for pain cleared for therapy. Patient supine on bed when received reporting pain on her back and both ankle, patient said the last night when she went to use the commode both ankle where painful to stand on it. Rolled on the edge of bed max assist, supine to sit max 
  Problem: Physical Therapy - Adult  Goal: By Discharge: Performs mobility at highest level of function for planned discharge setting.  See evaluation for individualized goals.  Description: FUNCTIONAL STATUS PRIOR TO ADMISSION: Patient was independent and active without use of DME.    HOME SUPPORT PRIOR TO ADMISSION: The patient lived alone with family to provide assistance.    Physical Therapy Goals  Initiated 12/26/2023  1.  Patient will move from supine to sit and sit to supine, scoot up and down, and roll side to side in bed with modified independence within 7 day(s).    2.  Patient will perform sit to stand with modified independence within 7 day(s).  3.  Patient will transfer from bed to chair and chair to bed with modified independence using the least restrictive device within 7 day(s).  4.  Patient will ambulate with modified independence for 200 feet with the least restrictive device within 7 day(s).     Outcome: Progressing   PHYSICAL THERAPY TREATMENT    Patient: Cindy Ortega (48 y.o. female)  Date: 12/31/2023  Diagnosis: Intractable pain [R52]  Bilateral sacral insufficiency fracture, initial encounter [M84.48XA]  Sacral insufficiency fracture with delayed healing [M84.48XG] Sacral insufficiency fracture with delayed healing      Precautions: Weight Bearing   Left Lower Extremity Weight Bearing: Weight Bearing As Tolerated (with Cam Walker boot)                ASSESSMENT:  Patient continues to benefit from skilled PT services and is progressing towards goals. Patient received EOB following transfer to bed from chair with nurse. Assisted to supine with min A for L LE limb management for self ostomy bag change. Dependent to don/doff CAM boot. Patient limited by high pain levels and poor movement tolerance. Performed sit to stand with mod A to achieve full erect posture. Weight shifts with increased pain but tolerated WB on LLE with UE assist using walker. Side steps to/from foot of bed with increased 
  Problem: Physical Therapy - Adult  Goal: By Discharge: Performs mobility at highest level of function for planned discharge setting.  See evaluation for individualized goals.  Description: FUNCTIONAL STATUS PRIOR TO ADMISSION: Patient was independent and active without use of DME.    HOME SUPPORT PRIOR TO ADMISSION: The patient lived alone with family to provide assistance.    Physical Therapy Goals  Initiated 12/26/2023  1.  Patient will move from supine to sit and sit to supine, scoot up and down, and roll side to side in bed with modified independence within 7 day(s).    2.  Patient will perform sit to stand with modified independence within 7 day(s).  3.  Patient will transfer from bed to chair and chair to bed with modified independence using the least restrictive device within 7 day(s).  4.  Patient will ambulate with modified independence for 200 feet with the least restrictive device within 7 day(s).     Outcome: Progressing   PHYSICAL THERAPY TREATMENT    Patient: Cindy Otrega (48 y.o. female)  Date: 12/29/2023  Diagnosis: Intractable pain [R52]  Bilateral sacral insufficiency fracture, initial encounter [M84.48XA]  Sacral insufficiency fracture with delayed healing [M84.48XG] Sacral insufficiency fracture with delayed healing      Precautions: Weight Bearing   Left Lower Extremity Weight Bearing: Weight Bearing As Tolerated (with Cam Walker boot)                ASSESSMENT:  Patient continues to benefit from skilled PT services and is slowly progressing towards goals. Pt recently returned to bed after sitting in chair \"a few hours\" agreeable to bed mobility and thera ex 2/2 high pain. Provides excellent effort with increased time, preforms with Cga/ min A x 1         PLAN:  Patient continues to benefit from skilled intervention to address the above impairments.  Continue treatment per established plan of care.    Recommendation for discharge: (in order for the patient to meet his/her long term goals): 
OCCUPATIONAL THERAPY TREATMENT  Patient: Cindy Ortega (48 y.o. female)  Date: 12/28/2023  Primary Diagnosis: Intractable pain [R52]  Bilateral sacral insufficiency fracture, initial encounter [M84.48XA]  Sacral insufficiency fracture with delayed healing [M84.48XG]       Precautions: Weight Bearing   Left Lower Extremity Weight Bearing: Weight Bearing As Tolerated (with Cam Walker boot)            Chart, occupational therapy assessment, plan of care, and goals were reviewed.    ASSESSMENT  Patient continues to benefit from skilled OT services and is slowly progressing towards goals. Spoke with RN pt as only received PO pain meds today. Dep to don L cam walker boot prior to supine to sit. Increase L hamstring spasm upon sitting EOB in prep for ADL's. Pt was able to transfer to chair with Min A x 2, and use of RW.  Limited tolerance 2/2 hamstring spasm. Once in the chair pt engaged with UE exercises with use of red thera band. Pt stated being familiar with exercises from home. Pt at times still tearful during tx.             PLAN :  Patient continues to benefit from skilled intervention to address the above impairments.  Continue treatment per established plan of care to address goals.    Recommend with staff: Adl's, there ex, there act    Recommend next OT session: cont towards goals    Recommendation for discharge: (in order for the patient to meet his/her long term goals): Therapy 3 hours/day 5-7 days/week    Other factors to consider for discharge: patient's current support system is unable to meet their requirements for physical assistance and concern for safely navigating or managing the home environment    IF patient discharges home will need the following DME:        SUBJECTIVE:   Patient stated “I am having a spasm in my leg.”    OBJECTIVE DATA SUMMARY:   Cognitive/Behavioral Status:  Orientation  Overall Orientation Status: Within Normal Limits  Orientation Level: Oriented X4       Functional Mobility 
and technique, is able to take few steps towards the chair - 3' with Rw, minAx 2 with antalgic gait step to gait pattern, requires cues for sequencing and walker management.      PLAN:  Patient continues to benefit from skilled intervention to address the above impairments.  Continue treatment per established plan of care.    Recommendation for discharge: (in order for the patient to meet his/her long term goals): Continue to assess pending progress  Rehab setting    Other factors to consider for discharge: high risk for falls and concern for safely navigating or managing the home environment    IF patient discharges home will need the following DME: continuing to assess with progress       SUBJECTIVE:   Patient stated, \" My back hurts the most\"    OBJECTIVE DATA SUMMARY:   Critical Behavior:  Orientation  Overall Orientation Status: Within Normal Limits  Orientation Level: Oriented X4  Cognition  Overall Cognitive Status: Exceptions  Attention Span: Attends with cues to redirect    Functional Mobility Training:  Bed Mobility:  Bed Mobility Training  Bed Mobility Training: Yes  Interventions: Verbal cues;Safety awareness training  Rolling: Minimum assistance;Additional time;Assist X1  Supine to Sit: Minimum assistance;Assist X1;Additional time  Scooting: Minimum assistance;Assist X1  Transfers:  Transfer Training  Transfer Training: Yes  Interventions: Verbal cues;Tactile cues;Safety awareness training  Sit to Stand: Minimum assistance;Assist X2;Adaptive equipment;Additional time  Stand to Sit: Minimum assistance;Assist X2;Adaptive equipment;Additional time  Bed to Chair: Minimum assistance;Assist X2;Adaptive equipment;Additional time  Balance:  Balance  Sitting: Impaired  Sitting - Static: Fair (occasional)  Sitting - Dynamic: Fair (occasional)  Standing: Impaired  Standing - Static: Fair;Constant support  Standing - Dynamic: Fair;Poor;Constant support   Ambulation/Gait Training:     Gait  Overall Level of 
established plan of care.    Recommendation for discharge: (in order for the patient to meet his/her long term goals): Therapy 3 hours/day 5-7 days/week    Other factors to consider for discharge: patient's current support system is unable to meet their requirements for physical assistance, high risk for falls, and concern for safely navigating or managing the home environment    IF patient discharges home will need the following DME: continuing to assess with progress       SUBJECTIVE:   Patient stated, \"I will try.\"    OBJECTIVE DATA SUMMARY:   Critical Behavior:          Functional Mobility Training:  Bed Mobility:  Bed Mobility Training  Overall Level of Assistance: Additional time;Minimum assistance  Interventions: Safety awareness training;Verbal cues  Supine to Sit: Minimum assistance;Assist X1;Additional time  Scooting: Additional time;Minimum assistance  Transfers:  Transfer Training  Sit to Stand: Minimum assistance;Assist X2  Stand to Sit: Minimum assistance;Assist X2  Balance:  Balance  Sitting: Impaired  Standing: Impaired  Standing - Static: Constant support;Fair  Standing - Dynamic: Constant support;Fair   Ambulation/Gait Training:     Gait  Distance (ft): 4 Feet  Assistive Device: Gait belt;Walker, rolling  Interventions: Safety awareness training;Verbal cues  Base of Support: Widened  Speed/Rose: Shuffled;Slow  Gait Abnormalities: Antalgic;Decreased step clearance  Neuro Re-Education:                                                                                                                                                                                                                                        Intervention/Education     Encouraged use of cryo therapy to aide in pain               Therapeutic Exercises:     EXERCISE   Sets   Reps   Active Active Assist   Passive Self ROM   Comments   Ankle Pumps   [x]                                        []                                       
improvement   Collaborate with multidisciplinary team to address chronic and comorbid conditions and prevent exacerbation or deterioration   Update acute care plan with appropriate goals if chronic or comorbid symptoms are exacerbated and prevent overall improvement and discharge  1/2/2024 0051 by Rae Low RN  Outcome: Progressing     Problem: Nutrition Deficit:  Goal: Optimize nutritional status  1/2/2024 1400 by Malvin Jenkins LPN  Outcome: Progressing  1/2/2024 0051 by Rae Low RN  Outcome: Progressing     Problem: Safety - Adult  Goal: Free from fall injury  1/2/2024 1400 by Malvin Jenkins LPN  Outcome: Progressing  1/2/2024 0051 by Rae Low RN  Outcome: Progressing     Problem: ABCDS Injury Assessment  Goal: Absence of physical injury  Outcome: Progressing     
prior to therapies. Despite this she presents with c/o pain at  back, buttocks  rated as 7/10, L ankle on weight bearing 4/10, and requires additional time and vc's for sequencing. She needs additional time and cues for pursed lip breathing to help with her feeling anxious throughout mobility. Pt sits edge of bed, needs encouragement to move her Ue's for increased upright seated posture. She stands with min/mod assist x 2 and takes steps towards chair. Pt set-up to wash her face, brush her teeth. Engaged with 2 UE exercises and encouraged to engage with them 3 x a day.             PLAN :  Patient continues to benefit from skilled intervention to address the above impairments.  Continue treatment per established plan of care to address goals.    Recommend with staff: ADL's, there ex, there act    Recommend next OT session: cont towards goals    Recommendation for discharge: (in order for the patient to meet his/her long term goals): Therapy 3 hours/day 5-7 days/week    Other factors to consider for discharge: available support system works or is unable to provide adequate supervision and the patient would be alone and concern for safely navigating or managing the home environment    IF patient discharges home will need the following DME:        SUBJECTIVE:   Patient stated \"I get nervous.”    OBJECTIVE DATA SUMMARY:   Cognitive/Behavioral Status:  Orientation  Overall Orientation Status: Within Normal Limits  Orientation Level: Oriented X4  Cognition  Overall Cognitive Status: Exceptions  Attention Span: Attends with cues to redirect    Functional Mobility and Transfers for ADLs:  Bed Mobility:  Bed Mobility Training  Bed Mobility Training: Yes  Interventions: Verbal cues;Safety awareness training  Rolling: Minimum assistance;Additional time;Assist X1  Supine to Sit: Minimum assistance;Assist X1;Additional time  Scooting: Minimum assistance;Assist X1     Transfers:   Transfer Training  Transfer Training: 
cues  Rolling: Additional time;Moderate assistance  Supine to Sit: Moderate assistance;Additional time  Sit to Supine: Maximum assistance;Additional time;Assist X2  Scooting: Maximum assistance;Additional time    Transfers:      Transfer Training  Transfer Training: No       Balance:      Balance  Sitting: Impaired (pt with significant pain at EOB)  Sitting - Static: Fair (occasional)  Sitting - Dynamic: Fair (occasional)    ADL Assessment:     Grooming: Stand by assistance  Grooming Skilled Clinical Factors: simulated semi supine in bed    LE Dressing: Dependent/Total  LE Dressing Skilled Clinical Factors: simulated bed level    Toileting: Modified independent   Toileting Skilled Clinical Factors: empty ostomy    ADL Intervention and task modifications:    Patient educated on back precautions and log rolling with pt verbalizing understanding      [facility] AM-PACTM \"6 Clicks\"                                                       Daily Activity Inpatient Short Form  How much help from another person does the patient currently need... Total; A Lot A Little None   1.  Putting on and taking off regular lower body clothing? [x]  1 []  2 []  3 []  4   2.  Bathing (including washing, rinsing, drying)? []  1 [x]  2 []  3 []  4   3.  Toileting, which includes using toilet, bedpan or urinal? [] 1 [x]  2 []  3 []  4   4.  Putting on and taking off regular upper body clothing? []  1 []  2 [x]  3 []  4   5.  Taking care of personal grooming such as brushing teeth? []  1 []  2 [x]  3 []  4   6.  Eating meals? []  1 []  2 []  3 [x]  4   © 2007, Trustees of [facility], under license to [employer] All rights reserved     Score: 15/24     Interpretation of Tool:  Represents clinically-significant functional categories (i.e. Activities of daily living).    Cutoff score 39.4 (19) correlates to a good likelihood of discharging home versus a facility  Starr Bryant, Adele Espana, Robert Hernandez, Tarah WOOD

## 2024-01-02 NOTE — CARE COORDINATION
1/2/2024 12:33 PM     Discharge to [facility] today, [facility] has accepted and has obtained auth.  Confirmed with Kavon in admissions they can accept pt today and requested transport at 2:30PM.    Met with pt and confirmed plan for discharge today to [facility], pt agreeable.  CM called to pt's [employer]([phone]), requested stretcher transport for 2:30PM, reference # [id number]. CM received call from [facility] who confirmed they were assigned pt's transport.    Nursing please call report to [phone].   LES Bruce        01/02/24 1255   Discharge Planning   Patient expects to be discharged to: Acute rehab   Services At/After Discharge   Transition of Care Consult (CM Consult) Acute Rehab   Mode of Transport at Discharge   (Non medical stretcher)

## 2024-01-03 ENCOUNTER — TELEPHONE (OUTPATIENT)
Facility: CLINIC | Age: 49
End: 2024-01-03

## 2024-01-03 NOTE — TELEPHONE ENCOUNTER
Care Transitions Initial Follow Up Call    Outreach made within 2 business days of discharge: Yes    Patient: Cindy Ortega Patient : 1975   MRN: [id number]  Reason for Admission: There are no discharge diagnoses documented for the most recent discharge.  Discharge Date: 24       Spoke with: Message left on voice mail     Discharge department/facility: [facility]    Scheduled appointment with PCP within 7-14 days    Follow Up  Future Appointments   Date Time Provider Department Center   6/3/2024 11:00 AM Ric Madrid APRN - NP [facility]       Luisana Craven LPN

## 2024-01-04 NOTE — PROGRESS NOTES
Hospitalist Progress Note  Chelita Workman MD  Answering service: [phone] OR [phone] from in house phone        Date of Service:  2023  NAME:  Cindy Ortega  :  1975  MRN:  [id number]      Admission Summary/HPI:   Given the patient's current clinical presentation, I have a high level of concern for decompensation if discharged from the emergency department.  Complex decision making was performed, which includes reviewing the patient's available past medical records, laboratory results, and x-ray films.        Interval history / Subjective:   Seen immediately after attempt at PT.  Unable to even slide leg over side of bed.  Patient is crying and in 10/10 pain.  States she cannot participate further in PT.  She is a chronic pain patient and requires higher doses of opiates.  Given IV Dilaudid.     Assessment & Plan:     Bilateral sacral fractures  Probably sacral insufficiency fractures.    Pain control with IV Dilaudid and p.o. medications.  Continue muscle relaxants  Discussed with Ortho      2.  Hyperlipidemia  Continue statins     3.  Prediabetes  Check hemoglobin A1c  Start sliding scale insulin.     4.  Seizure disorder  Not on any meds at this time.  She follows neurology as an outpatient.  No seizures for last one year.     5.  Crohn's disease  On Humira.  Has jejunostomy.      6.  Fall  Probably mechanical fall, check UA.  PT OT evaluation.     7. Enterocutaneous Fistula  Per patient its healing.     8. Chronic anemia  Stable.  Monitor Hb closely.         I have independently reviewed and interpreted patient's lab and other diagnostic data  External notes were reviewed  Critical Care Time: 0 excluding procedures    Code status: Full  Prophylaxis: Heparin, SCD  Care Plan discussed with: Patient, RN, Multidisciplinary Rounds  Anticipated Disposition:      Principal Problem:    Sacral insufficiency 
                                                                                              Hospitalist Progress Note  Chelita Workman MD  Answering service: [phone] OR [phone] from in house phone        Date of Service:  2023  NAME:  Cindy Ortega  :  1975  MRN:  [id number]      Admission Summary/HPI:   Given the patient's current clinical presentation, I have a high level of concern for decompensation if discharged from the emergency department.  Complex decision making was performed, which includes reviewing the patient's available past medical records, laboratory results, and x-ray films.        Interval history / Subjective:   Pain better controlled today.  Was able to do a little more with PT, but struggled.     Assessment & Plan:     Bilateral sacral fractures  Probably sacral insufficiency fractures.    Pain control with IV Dilaudid and p.o. medications.  Continue muscle relaxants  Discussed with Ortho   Struggling with PT due to pain control  Patient wanting IPR, case management working on this  If not accepted to IPR, will want discharge with home health     2.  Hyperlipidemia  Continue statins     3.  Prediabetes  Check hemoglobin A1c  Start sliding scale insulin.     4.  Seizure disorder  Not on any meds at this time.  She follows neurology as an outpatient.  No seizures for last one year.     5.  Crohn's disease  On Humira.  Has jejunostomy.      6.  Fall  Probably mechanical fall, check UA.  PT OT evaluation.     7. Enterocutaneous Fistula  Per patient its healing.     8. Chronic anemia  Stable.  Monitor Hb closely.         I have independently reviewed and interpreted patient's lab and other diagnostic data  External notes were reviewed  Critical Care Time: 0 excluding procedures    Code status: Full  Prophylaxis: Heparin, SCD  Care Plan discussed with: Patient, RN, Multidisciplinary Rounds  Anticipated Disposition:      Principal Problem:    Sacral insufficiency fracture with 
                   Orthopaedic Progress Note  Post Op day: * No surgery found *    2023 3:28 PM     Patient: Cindy Ortega MRN: [id number]  SSN: [id number]    YOB: 1975  Age: 48 y.o.  Sex: female      Admit date:  2023  Date of Surgery:  [unfilled]   Procedures:    Admitting Physician:  Saira Higgins MD   Surgeon:  * Surgery not found *    Consulting Physician(s): Treatment Team: Attending Provider: Chelita Workman MD; Consulting Physician: Trey Ornelas MD; Consulting Provider: Jaja Long APRN - NP; Patient Care Tech: Sharon Woodard; Registered Nurse: Chaim Abdalla RN; Occupational Therapist: Cyndie Patel OT; Physical Therapist: Gregg Liang PT; : Erma Diaz; Utilization Reviewer: Imani Hamlin RN    SUBJECTIVE:     Cindy Ortega is a 48 y.o. female is resting in bed complaining of left ankle and sacrum pain. Pt attempted to sit up on side of bed however was in too much pain. Xrays of left ankle obtained, no fracture.   OBJECTIVE:       Physical Exam:  General: Alert, cooperative, no distress.    Respiratory: Respirations unlabored  Neurological:  Neurovascular exam within normal limits.  Motor: + DF/PF.           Musc: + ROM of bilat UE/LE, + TTP of bilat SI joints with R>L  LLE - strength 5/5 DF/PF/ EFL/ EHL 5/5, mild pain with SLR, NVI   RLE - strength 5/5 DF/PF/ EFL/ EHL 5/5, moderate pain with SLR, NVI \  Left ankle: moderate swelling lateral malleolus/ pain with DF/ PF.    Patient Vitals for the past 8 hrs:   BP Temp Temp src Pulse Resp SpO2   23 1507 100/63 99.5 °F (37.5 °C) Oral 93 18 100 %   23 1133 107/64 98.6 °F (37 °C) Oral 96 20 97 %   23 0812 105/63 98.4 °F (36.9 °C) Oral 88 18 100 %                                          Temp (24hrs), Av.4 °F (36.9 °C), Min:97.9 °F (36.6 °C), Max:99.5 °F (37.5 °C)      Labs:        Recent Labs     23  0343   HCT 35.3   HGB 12.0     Lab Results 
  Physician Progress Note      PATIENT:               TENNILLE LIVINGSTON  [facility] #:                  [id number]  :                       1975  ADMIT DATE:       2023 11:34 AM  DISCH DATE:  RESPONDING  PROVIDER #:        Chelita Workman MD          QUERY TEXT:    Good afternoon.    Patient admitted with bilateral sacral fracture, thought to be sacral   insufficiency fractures. Noted to have moderate malnutrition in    Registered Dietician note.    If possible, please document in progress notes and discharge summary if you   are evaluating and /or treating any of the following:      The medical record reflects the following:    Risk Factors: 48 yr old female, Crohn's disease, prediabetes, enterocutaneous   fistula, chronic anemia, HLD    Clinical Indicators: from  RD note: \"Malnutrition Assessment:   Malnutrition Status:  Moderate malnutrition (23 1216) Context:  Chronic   Illness Findings of the 6 clinical characteristics of malnutrition: Energy   Intake:  No significant decrease in energy intake Weight Loss:  Greater than   7.5% over 3 months Body Fat Loss:  Mild body fat loss Triceps Muscle Mass   Loss:  No significant muscle mass loss Fluid Accumulation:  Mild Extremities,   Generalized  Strength:  Not Performed\"; Labs: 23 02:46 Albumin: 2.3,   23 05:00 Albumin: 2.2, 23 01:47 Albumin: 2.6;  Phosphorus:   2.4; 23 03:43  Magnesium: 1.4, 23 01:47 Magnesium: 1.8    Treatment: RD assessment, labs, ONS      Thank you,  Briseyda Carrasquillo RN, CDI  ____________________________________________________________    ASPEN Criteria:    https://aspenjournals.onlinelibrary.yang.com/doi/full/10.1177/169413182001627  5  Options provided:  -- Protein calorie malnutrition moderate  -- Other - I will add my own diagnosis  -- Disagree - Not applicable / Not valid  -- Disagree - Clinically unable to determine / Unknown  -- Refer to Clinical Documentation Reviewer    PROVIDER 
  Physician Progress Note      PATIENT:               TENNILLE LIVINGSTON  CSN #:                  [id number]  :                       1975  ADMIT DATE:       2023 11:34 AM  DISCH DATE:        2024 2:46 PM  RESPONDING  PROVIDER #:        Seamus Guthrie MD          QUERY TEXT:    Good morning.    Pt was admitted from 23-24 with bilateral sacral insufficiency   fractures.  Ortho consult states: \"pt has known hx of osteoporosis   secondary to chronic steroid use\". Dexa bone density study from 2022   showed: \"This patient is osteoporotic using the World Health Organization   criteria\".    If possible, please document in progress notes and discharge summary if you   are evaluating and/or treating any of the following:    The medical record reflects the following:    Risk Factors: 48 yr old female, osteoporosis, chronic steroid use, Crohn's   disease, anemia, hyperlipidemia, prediabetes, hypotension    Clinical Indicators: from  Ortho consult: \"Tennille Livingston is a 48 y.o.   female with PMH of pyelonephritis, UTI, Sz, jejunostomy, chronic steroid use,   osteoporosis who is being seen for low back pain/ hip pain after GLF earlier   this afternoon, pt reports she tripped over her puppy and fell backwards onto   to her buttocks. Work up reveled bilateral sacral ala fractures likely   insufficiency in nature and moderate L 3-4 canal stenosis. Pt prateek LE   numbness/tingling, + pain to bilat hips/ low back with R>L\" and \"-  Pt is   stable orthopaedically, Non-Operative management at this time - Bilat sacral   insufficiency fractures/Lumbar stenosis at L3-4  - pt has known hx of   osteoporosis secondary to chronic steroid use, advise conservative treatment   at this time with pain management and PT/OT, pt be up with assistance and WBAT   \";  CT abd/pelvis: Left sacral fracture and possible right sacral   fracture which may represent insufficiency fractures; from  DC Summary: 
0530: Tech notified this nurse regarding Pt's low BP of 87/38. Reached BP and was 89/49.    0534: Reached out to hospitalist regarding pt's BP being low. Recent BP 87/42. Pt's only complaint is tiredness. Awaiting orders.     0544: No new orders at this time. Reached out to RRT nurse regarding pt's BP.    RRT nurse came and assessed patient. Just waiting on orders from hospitalist.     0630: Recent BP 92/46. No new complaints from patient. Continuing with plan of care.   
[facility]  [address] [place] [address] [phone]      Hospitalist Progress Note      NAME: Cindy Ortega   :  1975  MRM:  [id number]    Date of service: 2023  10:29 AM       Assessment and Plan:   Bilateral sacral fractures/fall. Probably sacral insufficiency fractures. Pain control with IV Dilaudid and p.o. medications. Continue muscle relaxants.  Continue PT/OT.  Awaiting insurance auth for IPR    2.  Hyperlipidemia. Continue statins     3.  Prediabetes.  A1c is 6. continue sliding scale insulin.     4.  Seizure disorder.  Not on medications    5.  Crohn's disease.  Was on Humira at home, but here was started on prednisone 5 mg.  Has jejunostomy.      6.  Anemia likely due to chronic disease.  Event monitor    7.  Hypotension. This is chronic.            Subjective:     Chief Complaint:: Patient was seen and examined as a follow up for Bilateral sacral fracture.  Chart was reviewed.  bilateral hip pain is getting better a little     ROS:  (bold if positive, if negative)    Tolerating PT  Tolerating Diet        Objective:     Last 24hrs VS reviewed since prior progress note. Most recent are:    Vitals:    23 0953   BP: 106/71   Pulse:    Resp:    Temp:    SpO2:      SpO2 Readings from Last 6 Encounters:   23 99%   23 100%   10/25/23 99%        No intake or output data in the 24 hours ending 23 1029     Physical Exam:    Gen:  Well-developed, well-nourished, in no acute distress  HEENT:  Pink conjunctivae, PERRL, hearing intact to voice, moist mucous membranes  Neck:  Supple, without masses, thyroid non-tender  Resp:  No accessory muscle use, clear breath sounds without wheezes rales or rhonchi  Card:  No murmurs, normal S1, S2 without thrills, bruits or peripheral edema  Abd:  Soft, non-tender, non-distended, normoactive bowel sounds are present, no palpable organomegaly and no detectable hernias  Lymph:  No cervical or 
[facility]  [address] [place] [address] [phone]      Hospitalist Progress Note      NAME: Cindy Ortega   :  1975  MRM:  [id number]    Date of service: 2024  10:18 AM       Assessment and Plan:   Bilateral sacral fractures/fall. Probably sacral insufficiency fractures. Pain control with IV Dilaudid and p.o. medications. Continue muscle relaxants.  Continue PT/OT.  Awaiting insurance auth for IPR    2.  Hyperlipidemia. Continue statins     3.  Prediabetes.  A1c is 6. continue sliding scale insulin.     4.  Seizure disorder.  Not on medications    5.  Crohn's disease.  Was on Humira at home, but here was started on prednisone 5 mg.  Has jejunostomy.      6.  Anemia likely due to chronic disease.  Event monitor    7.  Hypotension. This is chronic.  Started on midodrine.  Not symptomatic            Subjective:     Chief Complaint:: Patient was seen and examined as a follow up for Bilateral sacral fracture.  Chart was reviewed.  Mild bilateral hip pain is getting better     ROS:  (bold if positive, if negative)    Tolerating PT  Tolerating Diet        Objective:     Last 24hrs VS reviewed since prior progress note. Most recent are:    Vitals:    24 0820   BP:    Pulse:    Resp: 17   Temp:    SpO2:      SpO2 Readings from Last 6 Encounters:   24 99%   23 100%   10/25/23 99%        No intake or output data in the 24 hours ending 24 1018     Physical Exam:    Gen:  Well-developed, well-nourished, in no acute distress  HEENT:  Pink conjunctivae, PERRL, hearing intact to voice, moist mucous membranes  Neck:  Supple, without masses, thyroid non-tender  Resp:  No accessory muscle use, clear breath sounds without wheezes rales or rhonchi  Card:  No murmurs, normal S1, S2 without thrills, bruits or peripheral edema  Abd:  Soft, non-tender, non-distended, normoactive bowel sounds are present, no palpable organomegaly and no detectable 
[facility]  [address] [place] [address] [phone]      Hospitalist Progress Note      NAME: Cindy Ortega   :  1975  MRM:  [id number]    Date of service: 2024  2:38 PM       Assessment and Plan:   Bilateral sacral fractures/fall. Probably sacral insufficiency fractures. Pain control with IV Dilaudid and p.o. medications. Continue muscle relaxants.  Continue PT/OT.  Awaiting insurance auth for IPR    2.  Hyperlipidemia. Continue statins     3.  Prediabetes.  A1c is 6. continue sliding scale insulin.     4.  Seizure disorder.  Not on medications    5.  Crohn's disease.  Was on Humira at home, but here was started on prednisone 5 mg.  Has jejunostomy.      6.  Anemia likely due to chronic disease.  Event monitor    7.  Hypotension. This is chronic.  Started on midodrine.  Not symptomatic            Subjective:     Chief Complaint:: Patient was seen and examined as a follow up for Bilateral sacral fracture.  Chart was reviewed.  She did not go yesterday due to transportation issues     ROS:  (bold if positive, if negative)    Tolerating PT  Tolerating Diet        Objective:     Last 24hrs VS reviewed since prior progress note. Most recent are:    Vitals:    24 1147   BP: (!) 115/48   Pulse: 82   Resp:    Temp: 98.2 °F (36.8 °C)   SpO2: 100%     SpO2 Readings from Last 6 Encounters:   24 100%   23 100%   10/25/23 99%          Intake/Output Summary (Last 24 hours) at 2024 1438  Last data filed at 2024 0644  Gross per 24 hour   Intake 2260.22 ml   Output 900 ml   Net 1360.22 ml        Physical Exam:    Gen:  Well-developed, well-nourished, in no acute distress  HEENT:  Pink conjunctivae, PERRL, hearing intact to voice, moist mucous membranes  Neck:  Supple, without masses, thyroid non-tender  Resp:  No accessory muscle use, clear breath sounds without wheezes rales or rhonchi  Card:  No murmurs, normal S1, S2 without thrills, bruits 
[facility]  [address] [place] [address] [phone]      Hospitalist Progress Note      NAME: Cindy Ortega   :  1975  MRM:  [id number]    Date of service: 2023  1:49 PM       Assessment and Plan:   Bilateral sacral fractures/fall. Probably sacral insufficiency fractures. Pain control with IV Dilaudid and p.o. medications. Continue muscle relaxants.  Continue PT/OT.  May need IPR    2.  Hyperlipidemia. Continue statins     3.  Prediabetes.  A1c is 6. continue sliding scale insulin.     4.  Seizure disorder.  Not on medications    5.  Crohn's disease.  Was on Humira at home, but here was started on prednisone 5 mg.  Has jejunostomy.      6.  Anemia likely due to chronic disease.  Event monitor           Subjective:     Chief Complaint:: Patient was seen and examined as a follow up for Bilateral sacral fracture.  Chart was reviewed.  C/O bilateral hip pain    ROS:  (bold if positive, if negative)    Tolerating PT  Tolerating Diet        Objective:     Last 24hrs VS reviewed since prior progress note. Most recent are:    Vitals:    23 1204   BP: 118/61   Pulse: 84   Resp: 16   Temp: 98.2 °F (36.8 °C)   SpO2: 99%     SpO2 Readings from Last 6 Encounters:   23 99%   23 100%   10/25/23 99%        No intake or output data in the 24 hours ending 23 1349     Physical Exam:    Gen:  Well-developed, well-nourished, in no acute distress  HEENT:  Pink conjunctivae, PERRL, hearing intact to voice, moist mucous membranes  Neck:  Supple, without masses, thyroid non-tender  Resp:  No accessory muscle use, clear breath sounds without wheezes rales or rhonchi  Card:  No murmurs, normal S1, S2 without thrills, bruits or peripheral edema  Abd:  Soft, non-tender, non-distended, normoactive bowel sounds are present, no palpable organomegaly and no detectable hernias  Lymph:  No cervical or inguinal adenopathy  Musc:  No cyanosis or clubbing  Skin:  
Nurse handed patient a copy of discharge instructions which have been read and explained to patient. New medications were read and explained to patient, patient verbalized understanding. Patient aware that prescriptions have been electronically sent to their pharmacy. Opportunity for questions and clarification offered. Removed patient's IV access with no complications. Vital signs stable. Patient sent with all belongings to [facility] IPR. Gave report to KENDY Saravia.   
Per pt the following medications are incorrect in MAR:   Ordered: morphine IR 30 mg q8h  At home pt takes: Morphine IR 30 mg q6h per pain MD Dario Tejeda pain specialist  [phone]    Ordered: Imodium 2 mg 4x a day PRN  At home pt takes: Imodium 6 mg 4x a day PRN    
RRT evaluation:    Sepsis Score .53      Predictive Model Details          32 (Caution)  Factor Value    Calculated 1/1/2024 06:03 29% Neurological exam X    Deterioration Index Model 28% Systolic 87     23% Age 48 years old     9% Potassium 4.6 mmol/L     3% Pulse 53     3% BUN abnormal (35 MG/DL)     2% Hematocrit abnormal (30.8 %)     2% Pulse oximetry 99 %     1% Sodium 139 mmol/L     0% Respiratory rate 16     0% Temperature 98.2 °F (36.8 °C)     0% WBC count 5.9 K/uL          Spoke with primary RN regarding pt BP. Pt resting comfortably in bed with no s/s of distress. Pt states that she has hypotension at home. Primary made provider aware.      Frankie Avilez RN              
Spiritual Care Assessment/Progress Note  [facility]    Name: Cindy Ortega MRN: [id number]    Age: 48 y.o.     Sex: female   Language: English     Date: 12/31/2023            Total Time Calculated: 31 min              Spiritual Assessment begun in [facility] B4 MULTI-SPECIALTY ORTHOPEDICS 2  Service Provided For:: Patient  Referral/Consult From:: Rounding  Encounter Overview/Reason : Initial Encounter    Spiritual beliefs:      [x] Involved in a crescencio tradition/spiritual practice: Southern Nondenominational     [] Supported by a crescencio community:      [] Claims no spiritual orientation:      [] Seeking spiritual identity:           [] Adheres to an individual form of spirituality:      [] Not able to assess:                Identified resources for coping and support system:   Support System: Family members (Aunt)       [x] Prayer                  [] Devotional reading               [] Music                  [] Guided Imagery     [] Pet visits                                        [] Other: (COMMENT)     Specific area/focus of visit   Encounter:    Crisis:    Spiritual/Emotional needs: Type: Spiritual Support (morale check)  Ritual, Rites and Sacraments:    Grief, Loss, and Adjustments:    Ethics/Mediation:    Behavioral Health:    Palliative Care:    Advance Care Planning:      Plan/Referrals:  (Please contact [facility] for further assistance needed.)    Narrative: Chart review. I rounded on Multi-Specialty Orthopedics floor and met and conversed with Cindy Ortega. Her MPOA is Katherine Chambers. Katherine's contact number is [phone]. Patient communicated her Hinduism as Southern Nondenominational but does not attend a specific Oriental orthodox service. Patient shared how she fell and injured herself. Patient shared medical issues and historical events. Patient lives in [facility]. Patient also shared that her mom passed away in 2021. Patient Cindy stated that her dad is in a nursing facility in [place]. 
Spiritual Care Partner Volunteer visited patient at [facility] in [facility] B4 MULTI-SPECIALTY ORTHOPEDICS 2 on 12/27/2023     Documented by:  Daniel Chris MDiv  Staff   Paging Service [phone] (ANDRES)     
cervical or inguinal adenopathy  Musc:  No cyanosis or clubbing  Skin:  No rashes or ulcers, skin turgor is good  Neuro:  Cranial nerves are grossly intact, no focal motor weakness, follows commands appropriately  Psych:  Good insight, oriented to person, place and time, alert  __________________________________________________________________  Medications Reviewed: (see below)  Medications:     Current Facility-Administered Medications   Medication Dose Route Frequency    magnesium sulfate 2000 mg in water 50 mL IVPB  2,000 mg IntraVENous Once    PN-Adult Premix 5/15 - Standard Electrolytes - Central Line   IntraVENous Continuous TPN    morphine (MS CONTIN) extended release tablet 60 mg  60 mg Oral q8h    morphine (MSIR) tablet 60 mg  60 mg Oral Q6H PRN    predniSONE (DELTASONE) tablet 5 mg  5 mg Oral Daily    pantoprazole (PROTONIX) 40 mg in sodium chloride (PF) 0.9 % 10 mL injection  40 mg IntraVENous Daily    fat emulsion (INTRALIPID/NUTRILIPID) 20 % infusion 250 mL  250 mL IntraVENous Once per day on Mon Thu    cyclobenzaprine (FLEXERIL) tablet 10 mg  10 mg Oral TID PRN    loperamide (IMODIUM) capsule 2 mg  2 mg Oral 4x Daily PRN    [Held by provider] furosemide (LASIX) tablet 20 mg  20 mg Oral Daily    promethazine (PHENERGAN) tablet 12.5 mg  12.5 mg Oral Q6H PRN    dronabinol (MARINOL) capsule 5 mg  5 mg Oral Daily    sodium chloride flush 0.9 % injection 5-40 mL  5-40 mL IntraVENous 2 times per day    sodium chloride flush 0.9 % injection 5-40 mL  5-40 mL IntraVENous PRN    0.9 % sodium chloride infusion   IntraVENous PRN    potassium chloride (KLOR-CON) extended release tablet 40 mEq  40 mEq Oral PRN    Or    potassium bicarb-citric acid (EFFER-K) effervescent tablet 40 mEq  40 mEq Oral PRN    Or    potassium chloride 10 mEq/100 mL IVPB (Peripheral Line)  10 mEq IntraVENous PRN    magnesium sulfate 2000 mg in 50 mL IVPB premix  2,000 mg IntraVENous PRN    enoxaparin (LOVENOX) injection 40 mg  40 mg 
PRN    Or    ondansetron (ZOFRAN) injection 4 mg  4 mg IntraVENous Q6H PRN    polyethylene glycol (GLYCOLAX) packet 17 g  17 g Oral Daily PRN    acetaminophen (TYLENOL) tablet 650 mg  650 mg Oral Q6H PRN    Or    acetaminophen (TYLENOL) suppository 650 mg  650 mg Rectal Q6H PRN        Lab Data Reviewed: (see below)  Lab Review:     Recent Labs     12/27/23 0147 12/28/23  0520 12/29/23  0151   WBC 8.0 6.9 7.7   HGB 12.5 10.8* 11.1*   HCT 36.5 32.1* 32.0*   * 103* 125*       Recent Labs     12/27/23 0147 12/28/23  0520 12/29/23  0151    140 137   K 4.0 3.6 4.2    106 105   CO2 28 29 29   BUN 22* 28* 35*   MG 1.8 1.8 1.6   PHOS 2.4* 2.9 2.7       No results found for: \"GLUCPOC\"  No results for input(s): \"PH\", \"PCO2\", \"PO2\", \"HCO3\", \"FIO2\" in the last 72 hours.  No results for input(s): \"INR\" in the last 72 hours.  [unfilled]    I have reviewed notes of prior 24hr.    Other pertinent lab:     Total time: -25- minutes. I personally saw and examined the patient during this time period.  Greater than 50% of this time was spent in counseling and coordination of care.    I personally reviewed chart, notes, data and current medications in the medical record.  I have personally examined and treated the patient at bedside during this period.                 Care Plan discussed with: Patient, Care Manager, Nursing Staff, and >50% of time spent in counseling and coordination of care    Discussed:  Care Plan    Prophylaxis:  Lovenox    Disposition:  [facility]/Rehab           ___________________________________________________    Attending Physician: Seamus Guthrie MD      
Evaluation:   Behavioral-Environmental Outcomes: None Identified  Food/Nutrient Intake Outcomes: Parenteral Nutrition Intake/Tolerance  Physical Signs/Symptoms Outcomes: Biochemical Data, Hemodynamic Status    Discharge Planning:    No discharge needs at this time     SUNG ELW RD, MS  Available via [employer] # [phone]

## 2024-01-12 ENCOUNTER — TELEPHONE (OUTPATIENT)
Facility: CLINIC | Age: 49
End: 2024-01-12

## 2024-01-12 NOTE — TELEPHONE ENCOUNTER
Leeanna from [facility] called on behalf of patient. She stated she was seen in the hospital recently and they would like to her to have home PT and OT. She would like to know is TSS could follow up on that. Call back number for Leeanna is [phone].

## 2024-01-26 ENCOUNTER — TELEMEDICINE (OUTPATIENT)
Facility: CLINIC | Age: 49
End: 2024-01-26
Payer: MEDICARE

## 2024-01-26 DIAGNOSIS — S82.892A: ICD-10-CM

## 2024-01-26 DIAGNOSIS — Z09 HOSPITAL DISCHARGE FOLLOW-UP: Primary | ICD-10-CM

## 2024-01-26 DIAGNOSIS — G89.29 OTHER CHRONIC PAIN: ICD-10-CM

## 2024-01-26 PROCEDURE — 99213 OFFICE O/P EST LOW 20 MIN: CPT | Performed by: NURSE PRACTITIONER

## 2024-01-26 ASSESSMENT — PATIENT HEALTH QUESTIONNAIRE - PHQ9
SUM OF ALL RESPONSES TO PHQ QUESTIONS 1-9: 0
2. FEELING DOWN, DEPRESSED OR HOPELESS: 0
SUM OF ALL RESPONSES TO PHQ QUESTIONS 1-9: 0
SUM OF ALL RESPONSES TO PHQ9 QUESTIONS 1 & 2: 0
1. LITTLE INTEREST OR PLEASURE IN DOING THINGS: 0
SUM OF ALL RESPONSES TO PHQ QUESTIONS 1-9: 0
SUM OF ALL RESPONSES TO PHQ QUESTIONS 1-9: 0

## 2024-01-26 NOTE — PROGRESS NOTES
Chief Complaint   Patient presents with    Follow-Up from Hospital     [facility].      PHQ-9 Total Score: 0 (1/26/2024  1:43 PM)    \"Have you been to the ER, urgent care clinic since your last visit?  Hospitalized since your last visit?\"    YES - When: approximately 1 months ago.  Where and Why: broke ankle.    “Have you seen or consulted any other health care providers outside of [facility] since your last visit?”    NO        “Have you had a pap smear?”    NO

## 2024-01-26 NOTE — PROGRESS NOTES
Cindy Ortega, was evaluated through a synchronous (real-time) audio-video encounter. The patient (or guardian if applicable) is aware that this is a billable service, which includes applicable co-pays. This Virtual Visit was conducted with patient's (and/or legal guardian's) consent. Patient identification was verified, and a caregiver was present when appropriate.   The patient was located at Home: [address] [place] [id number]  Provider was located at Home (Appt Dept State): ANNAMARIE Ortega (:  1975) is a Established patient, presenting virtually for evaluation of the following:    Assessment & Plan   Below is the assessment and plan developed based on review of pertinent history, physical exam, labs, studies, and medications.  1. Hospital discharge follow-up  Patient has completed IP rehab an now continues out patient with [facility]    2. Broken ankle, left, closed, initial encounter  Patient has completed IP rehab an now continues out patient with [facility].  Will will be signing off on these orders    3. Other chronic pain  Patient's pain needs are taken care of by pain management  No follow-ups on file.       Subjective   47 yo female presents for hospital discharge follow up for a broken ankle.  [place] day to fell in the bathroom and in trying to avoid falling on her puppy she fell backwards.  She was treated in the ED and was later discharge to do IP rehab at [facility] and was discharged [id number]. She is now receiving PT services with [facility]. Reviewed labs of Dr Workman.      Review of Systems   Gastrointestinal:         Jujunostomy in place, crohns   Endocrine:        Pre diabetes   Genitourinary:         Renal mass   Musculoskeletal:         Broken ankle   Neurological:  Positive for seizures.   Hematological:         Thrombocytopenia   Psychiatric/Behavioral:          Anxiety and depression          Objective   Patient-Reported Vitals  No data recorded

## 2024-04-25 ENCOUNTER — HOSPITAL ENCOUNTER (OUTPATIENT)
Facility: HOSPITAL | Age: 49
Discharge: HOME | End: 2024-04-25
Payer: COMMERCIAL

## 2024-04-25 VITALS — TEMPERATURE: 97.5 F | RESPIRATION RATE: 18 BRPM

## 2024-04-25 PROCEDURE — 99212 OFFICE O/P EST SF 10 MIN: CPT

## 2024-04-25 RX ORDER — MORPHINE SULFATE 60 MG/1
60 TABLET, FILM COATED, EXTENDED RELEASE ORAL 3 TIMES DAILY
COMMUNITY
Start: 2024-04-26

## 2024-04-25 ASSESSMENT — PAIN SCALES - GENERAL: PAINLEVEL_OUTOF10: 4

## 2024-04-25 NOTE — WOUND CARE
Discharge Instructions/Wound Orders  [facility]  [address], [address]  [place] [address]  Telephone: [phone]     FAX [phone]    NAME:  Cindy Ortega  YOB: 1975  MEDICAL RECORD NUMBER:  [id number]  DATE:  4/25/2024  Ostomy Care Orders:  1) Remove old bag and clean stoma and peristomal skin with tap water and paper towels, dry thoroughly.  2) If skin is irritated crust with stoma powder and dot with No Sting skin prep, if no irritation SKIP THIS STEP.  3) Shape Jayson [id number] barrier ring to size, crimp inner border and place over stoma site.  4) Cut [place] [id number] to size 25mm remove plastic backing and fold like taco to place over stoma site.  5) Remove paper from tape border and smooth tape into place.  6) Change bag 2 x week and as needed for leaking.     Dietary:  [x] Diet as tolerated: [] Calorie Diabetic Diet:Low carb and no Sugar [] No Added Salt:[] Increase Protein: [] Other:Limit the amount of liquid you are drinking and avoid drinking in between meals   Activity:  [x] Activity as tolerated:  [] Patient has no activity restrictions     [] Strict Bedrest: [] Remain off Work:     [] May return to full duty work:                                   [] Return to work with restrictions:             Return Appointment:  [x] Return Appointment: Patient will follow-up as needed for complications.  [] Ordered tests:    Electronically signed MOUNA POSADA RN on 4/25/2024 at 11:26 AM     Wound Care Center Information: Should you experience any significant changes in your wound(s) or have questions about your wound care, please contact the [facility] at MONDAY - FRIDAY 8:00 am - 4:30.  If you need help with your wound outside these hours and cannot wait until we are again available, contact your PCP or go to the hospital emergency room.     PLEASE NOTE: IF YOU ARE UNABLE TO OBTAIN WOUND SUPPLIES, CONTINUE TO USE THE 
OSTOMY Assessment    Stoma Tissue Assessment: Post-op _x__Follow-up       Type of Diversion: ___New___ Established _x__Revision___Permanent____Temporary    __x___  Ileostomy   _____  Colostomy: ____ Ascending, ____ Transverse, _____. Sigmoid   _____  Urostomy   _____  Ileal Conduit   _____  Mucous Fistula     Appearance of Ostomy:   _x__ Bright Red /Moist/Viable ___ Dark Red ___ Pink ___ Sloughing ___Necrotic____Pallor ____Red  ___Dry ____Moist    Stoma Size: __25_______ (mm) ___Round ___ Oval ___Irregular     Stoma Height:   ____Flush ____Retracted __x__Budded ____Edematous ____Prolapse     Stoma Location  ____ Left Side          __x__Right Side     _____Umbilicus  _____Incision Line  ____ Above Belt       __x__ Below Belt    Abdominal Contours  Patient has lost >100 lbs and has huge amounts of excess skin  ____ Firm ____ Flat _x___Flabby _x__Soft ___Round ___ Hard ___ Other    GI Stoma Function: ___ Yes ___No   Output:   ____Sero-Sanguenous ____Sanguenous____ Bilious __x__ Liquid ____Semi-liquid ____ Pasty ____ Formed ___Soft ___Firm ____ Black____Brown ____Foul Odor    Peristomal skin:   ___ Intact _x__ Irritant Dermatitis ___ Allergic Contact Dermatitis ___Candidiasis ___Caput Medusae ___Folliculitis ___Mechanical Trauma ___Mucosal Transplantation    ___Pyoderma Gangrenosum ___Hyperplasia ___Radiation Trauma ___Allergies mpling  ___ Dimpling ____Blistered ____Fragile ____Macerated ___Peeling  ___Ulceration  ___ Fungal ___Peristomal Hernia ___Non-blanchable ___ Hypergranulation      Stoma Complications:   __Excessive Bleeding __Ischemia __ Abscess __Necrosis __Prolapse   __Hernia __ Retraction __Stenosis __Mucosal Separation __Melanosis Coli   __Laceration __Other     Application for Patient    Wafer and pouch used during assessment and education ___[place] [id number]_________    Pouch System Recommended:   _x_One-Piece __Two-Piece ___Custom     Flat:   ___Pre-cut   ___Cut-to fit     Convexity: ___Shallow 
Ostomy Care Plan  Risk for impaired skin integrity  Goal: Patient will exhibit intact peristomal skin at each visit.  Interventions: Assess patients peristomal skin at each visit.                            Instruct patient in peristomal skin care procedures at each visit.    Outcomes: Patient will demonstrate proper sizing and application of wafer.                       Patient will demonstrate intact peristomal skin without irritation.                       Patient will verbalize two strategies to prevent skin irritation.    Risk for disturbed body image  Goal: Patient will verbalize concerns in appearance, relationships and lifestyle changes.  Interventions: Assess patients coping mechanisms and body image issues at each visit.  Outcomes: Patient will verbalize/demonstrate comfort with body image as evidenced by willingness to manage ostomy care and acceptance of body image change.     Knowledge Deficit r/t Ostomy Care  Goal: Patient will verbalize/demonstrate independent ostomy care by end of teaching sessions.  Interventions: Assess patients understanding/ability to manage ostomy care including daily maintenance of pouch and intermittent changing of appliance.  Outcomes: Patient will demonstrate ability to remove, clean and reapply ostomy pouch.  Patient will verbalize understanding of ostomy pouching complications (i.e. failure to maintain a seal for at least 3 to 4 days, leaking of pouch, skin irritation of peristomal skin).      Goal 1 met today.  Goal 2 addressed today, patient is very experienced with ostomy, issues related to massive weight loss. She will follow-up as needed if we need to revise the plan, otherwise goal 2 completed today.   
Requires at least one staff member to physically assist the patient in ambulating into the treatment room and/or on off chair/bed.  Requires assistance to the bathroom.   []   2   Full Assistance Requires assistance of at least two staff members to transfer the patient into the treatment room and/or on/off bed/chair. \"Total Transfer.”  Unable to use bathroom requires bedside commode and/or bedpan []   3       Teaching Effort Documented in [facility] Clinical Note  Effort Definition Points   No Teaching  []   0   General Initial/Simple lesson:  Assess readiness to learn, and assess patient learning style to determine educational flow/special needs for learning.  Teaching related to 1-3 topics  Documentation in [facility] completed.   []   1   Intermediate Assess readiness to learn, and assess patient learning style to determine educational flow/special needs for learning.  Teaching related to 3-4 topics.   Hernia belt application and care considerations  Documentation in [facility] completed.   [x]   2   Complex Assess readiness to learn, and assess patient learning style to determine educational flow/special needs for learning.  The teaching of greater than 5 additional topics   Pre-operative ostomy education with a review of written resources for patient/family/caregiver as needed.  Demonstration/return demonstration of ostomy irrigation  Documentation in [facility] completed.   []   3     Patient Assessment and Planning in [facility] Clinical Note   Planning Definition Points   Simple Simple pouch change procedure completed and reviewed with patient/family/caregiver.   Documentation in [facility] completed.     [x]   1   Intermediate Moderate level of follow-up needs:   Pouch change/discharge procedure revised and reviewed with patient/caregiver.    Communications with outside resources, i.e., Telephone calls to Surgeon/ PCP, family/caregiver, home health, ECF.   Documentation in [facility] completed.     []   2   Complex Complex level

## 2024-06-04 ENCOUNTER — OFFICE VISIT (OUTPATIENT)
Facility: CLINIC | Age: 49
End: 2024-06-04
Payer: COMMERCIAL

## 2024-06-04 VITALS
DIASTOLIC BLOOD PRESSURE: 62 MMHG | HEIGHT: 62 IN | WEIGHT: 199 LBS | HEART RATE: 68 BPM | BODY MASS INDEX: 36.62 KG/M2 | TEMPERATURE: 97.3 F | OXYGEN SATURATION: 98 % | SYSTOLIC BLOOD PRESSURE: 124 MMHG

## 2024-06-04 DIAGNOSIS — Z90.49 S/P TOTAL COLECTOMY: ICD-10-CM

## 2024-06-04 DIAGNOSIS — Z00.00 WELL ADULT EXAM: Primary | ICD-10-CM

## 2024-06-04 DIAGNOSIS — E66.01 SEVERE OBESITY (BMI 35.0-39.9) WITH COMORBIDITY (HCC): ICD-10-CM

## 2024-06-04 DIAGNOSIS — Z93.4 JEJUNOSTOMY PRESENT (HCC): ICD-10-CM

## 2024-06-04 DIAGNOSIS — L02.416 CELLULITIS AND ABSCESS OF LEFT LEG: ICD-10-CM

## 2024-06-04 DIAGNOSIS — K50.919 CROHN'S DISEASE WITH COMPLICATION, UNSPECIFIED GASTROINTESTINAL TRACT LOCATION (HCC): ICD-10-CM

## 2024-06-04 DIAGNOSIS — G40.909 SEIZURE DISORDER (HCC): ICD-10-CM

## 2024-06-04 DIAGNOSIS — L03.116 CELLULITIS AND ABSCESS OF LEFT LEG: ICD-10-CM

## 2024-06-04 PROBLEM — E13.9 SECONDARY DIABETES (HCC): Status: RESOLVED | Noted: 2020-11-23 | Resolved: 2024-06-04

## 2024-06-04 PROBLEM — D69.6 THROMBOCYTOPENIA (HCC): Status: RESOLVED | Noted: 2023-11-06 | Resolved: 2024-06-04

## 2024-06-04 PROCEDURE — 99214 OFFICE O/P EST MOD 30 MIN: CPT | Performed by: FAMILY MEDICINE

## 2024-06-04 PROCEDURE — 99396 PREV VISIT EST AGE 40-64: CPT | Performed by: FAMILY MEDICINE

## 2024-06-04 RX ORDER — LORATADINE 10 MG/1
TABLET ORAL
COMMUNITY
Start: 2024-05-01

## 2024-06-04 RX ORDER — MORPHINE SULFATE 30 MG/1
TABLET ORAL
COMMUNITY
Start: 2024-05-16

## 2024-06-04 RX ORDER — DOXYCYCLINE HYCLATE 100 MG
100 TABLET ORAL 2 TIMES DAILY
Qty: 20 TABLET | Refills: 0 | Status: SHIPPED | OUTPATIENT
Start: 2024-06-04 | End: 2024-06-14

## 2024-06-04 ASSESSMENT — PATIENT HEALTH QUESTIONNAIRE - PHQ9
SUM OF ALL RESPONSES TO PHQ QUESTIONS 1-9: 0
2. FEELING DOWN, DEPRESSED OR HOPELESS: NOT AT ALL
SUM OF ALL RESPONSES TO PHQ QUESTIONS 1-9: 0
SUM OF ALL RESPONSES TO PHQ9 QUESTIONS 1 & 2: 0
1. LITTLE INTEREST OR PLEASURE IN DOING THINGS: NOT AT ALL

## 2024-06-04 NOTE — PROGRESS NOTES
Chief Complaint   Patient presents with    Annual Exam       \"Have you been to the ER, urgent care clinic since your last visit?  Hospitalized since your last visit?\"    YES - When: approximately 2 months ago.  Where and Why: Sepsis.    “Have you seen or consulted any other [facility] providers [facility]?”    YES - When: approximately 2 months ago.  Where and Why: Gastro and wound care.    Have you had a mammogram?”   YES - Where: [facility]  Nurse/CMA to request most recent records if not in the chart    No breast cancer screening on file      “Have you had a pap smear?”    YES - Where: [facility] Nurse/CMA to request most recent records if not in the chart    No cervical cancer screening on file             Click Here for Release of Records Request    PHQ-9 Total Score: 0 (6/4/2024 11:10 AM)
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
## 2024-06-13 ENCOUNTER — TELEPHONE (OUTPATIENT)
Facility: CLINIC | Age: 49
End: 2024-06-13

## 2024-06-13 NOTE — TELEPHONE ENCOUNTER
Patient is requesting a refill on her Midodrine (Proamatine) 5 mg. I do not see that Dr. Raymond or ANIRUDH Larios had prescribed this for her. She would like for it to be sent to [facility] in [place] if possible.

## 2024-06-13 NOTE — TELEPHONE ENCOUNTER
We don't generally write this medication. It looks like this was started in the hospital in Jan for a 4 month supply. Can we find out how often she is taking this so I can update the directions?    Thanks!   Awake/Alert

## 2024-07-12 DIAGNOSIS — L03.116 CELLULITIS AND ABSCESS OF LEFT LEG: ICD-10-CM

## 2024-07-12 DIAGNOSIS — L02.416 CELLULITIS AND ABSCESS OF LEFT LEG: ICD-10-CM

## 2024-07-12 RX ORDER — DOXYCYCLINE HYCLATE 100 MG
100 TABLET ORAL 2 TIMES DAILY
Qty: 20 TABLET | Refills: 0 | OUTPATIENT
Start: 2024-07-12 | End: 2024-07-22

## 2024-08-07 ENCOUNTER — TELEPHONE (OUTPATIENT)
Facility: CLINIC | Age: 49
End: 2024-08-07

## 2024-08-07 NOTE — TELEPHONE ENCOUNTER
Patient called in as she currently has an infection that she believes is turning into a UTI. Patient stated that she has cramping, chills, and a pain in her lower stomach. Patient also noted that she is having discharge in her anal region. Stated that it does not look like the normal discharge. Are we able to accommodate this patient?

## 2024-08-08 ENCOUNTER — HOSPITAL ENCOUNTER (EMERGENCY)
Facility: HOSPITAL | Age: 49
Discharge: HOME | End: 2024-08-08
Attending: EMERGENCY MEDICINE
Payer: COMMERCIAL

## 2024-08-08 ENCOUNTER — APPOINTMENT (OUTPATIENT)
Facility: HOSPITAL | Age: 49
End: 2024-08-08
Payer: COMMERCIAL

## 2024-08-08 VITALS
SYSTOLIC BLOOD PRESSURE: 105 MMHG | DIASTOLIC BLOOD PRESSURE: 69 MMHG | WEIGHT: 184 LBS | BODY MASS INDEX: 33.86 KG/M2 | RESPIRATION RATE: 17 BRPM | HEIGHT: 62 IN | HEART RATE: 73 BPM | OXYGEN SATURATION: 97 % | TEMPERATURE: 99 F

## 2024-08-08 DIAGNOSIS — S31.109A OPEN WOUND OF ABDOMINAL WALL, INITIAL ENCOUNTER: Primary | ICD-10-CM

## 2024-08-08 LAB
ALBUMIN SERPL-MCNC: 4 G/DL (ref 3.5–5.2)
ALBUMIN/GLOB SERPL: 0.9 (ref 1.1–2.2)
ALP SERPL-CCNC: 268 U/L (ref 35–104)
ALT SERPL-CCNC: 37 U/L (ref 10–35)
ANION GAP SERPL CALC-SCNC: 11 MMOL/L (ref 5–15)
APPEARANCE UR: ABNORMAL
AST SERPL-CCNC: 41 U/L (ref 10–35)
BACTERIA URNS QL MICRO: NEGATIVE /HPF
BASOPHILS # BLD: 0 K/UL (ref 0–1)
BASOPHILS NFR BLD: 1 % (ref 0–1)
BILIRUB SERPL-MCNC: 1 MG/DL (ref 0.2–1)
BILIRUB UR QL CFM: NEGATIVE
BUN SERPL-MCNC: 37 MG/DL (ref 6–20)
BUN/CREAT SERPL: 44 (ref 12–20)
CALCIUM SERPL-MCNC: 9.1 MG/DL (ref 8.6–10)
CHLORIDE SERPL-SCNC: 93 MMOL/L (ref 98–107)
CO2 SERPL-SCNC: 35 MMOL/L (ref 22–29)
COLOR UR: ABNORMAL
CREAT SERPL-MCNC: 0.85 MG/DL (ref 0.5–0.9)
DEPRECATED S PYO AG THROAT QL EIA: NEGATIVE
DIFFERENTIAL METHOD BLD: ABNORMAL
EOSINOPHIL # BLD: 0.1 K/UL (ref 0–0.4)
EOSINOPHIL NFR BLD: 1 %
EPITH CASTS URNS QL MICRO: ABNORMAL /LPF
ERYTHROCYTE [DISTWIDTH] IN BLOOD BY AUTOMATED COUNT: 13.2 % (ref 11.5–14.5)
GLOBULIN SER CALC-MCNC: 4.3 G/DL (ref 2–4)
GLUCOSE SERPL-MCNC: 75 MG/DL (ref 65–100)
GLUCOSE UR STRIP.AUTO-MCNC: NEGATIVE MG/DL
HCG UR QL: NEGATIVE
HCT VFR BLD AUTO: 42.3 % (ref 35–47)
HGB BLD-MCNC: 14.3 G/DL (ref 11.5–16)
HGB UR QL STRIP: ABNORMAL
IMM GRANULOCYTES # BLD AUTO: 0 K/UL (ref 0–0.04)
IMM GRANULOCYTES NFR BLD AUTO: 0 % (ref 0–0.5)
KETONES UR QL STRIP.AUTO: NEGATIVE MG/DL
LEUKOCYTE ESTERASE UR QL STRIP.AUTO: NEGATIVE
LYMPHOCYTES # BLD: 2.2 K/UL (ref 0.8–3.5)
LYMPHOCYTES NFR BLD: 25 % (ref 12–49)
MCH RBC QN AUTO: 33.8 PG (ref 26–34)
MCHC RBC AUTO-ENTMCNC: 33.8 G/DL (ref 30–36.5)
MCV RBC AUTO: 100 FL (ref 80–99)
MONOCYTES # BLD: 0.7 K/UL (ref 0–1)
MONOCYTES NFR BLD: 7 % (ref 5–13)
MUCOUS THREADS URNS QL MICRO: ABNORMAL /LPF
NEUTS SEG # BLD: 5.8 K/UL (ref 1.8–8)
NEUTS SEG NFR BLD: 66 % (ref 32–75)
NITRITE UR QL STRIP.AUTO: NEGATIVE
NRBC # BLD: 0 K/UL (ref 0–0.01)
NRBC BLD-RTO: 0 PER 100 WBC
PH UR STRIP: 6 (ref 5–8)
PLATELET # BLD AUTO: 155 K/UL (ref 150–400)
PMV BLD AUTO: 9.9 FL (ref 8.9–12.9)
POTASSIUM SERPL-SCNC: 3.2 MMOL/L (ref 3.5–5.1)
PROT SERPL-MCNC: 8.3 G/DL (ref 6.4–8.3)
PROT UR STRIP-MCNC: ABNORMAL MG/DL
RBC # BLD AUTO: 4.23 M/UL (ref 3.8–5.2)
RBC #/AREA URNS HPF: ABNORMAL /HPF
SARS-COV-2 RNA RESP QL NAA+PROBE: NOT DETECTED
SODIUM SERPL-SCNC: 139 MMOL/L (ref 136–145)
SOURCE: NORMAL
SP GR UR REFRACTOMETRY: 1.02 (ref 1–1.03)
URINE CULTURE IF INDICATED: ABNORMAL
UROBILINOGEN UR QL STRIP.AUTO: 0.2 EU/DL (ref 0.2–1)
WBC # BLD AUTO: 8.8 K/UL (ref 3.6–11)
WBC URNS QL MICRO: ABNORMAL /HPF (ref 0–4)

## 2024-08-08 PROCEDURE — 81001 URINALYSIS AUTO W/SCOPE: CPT

## 2024-08-08 PROCEDURE — 80053 COMPREHEN METABOLIC PANEL: CPT

## 2024-08-08 PROCEDURE — 87880 STREP A ASSAY W/OPTIC: CPT

## 2024-08-08 PROCEDURE — 85025 COMPLETE CBC W/AUTO DIFF WBC: CPT

## 2024-08-08 PROCEDURE — 99284 EMERGENCY DEPT VISIT MOD MDM: CPT

## 2024-08-08 PROCEDURE — 74176 CT ABD & PELVIS W/O CONTRAST: CPT

## 2024-08-08 PROCEDURE — 87070 CULTURE OTHR SPECIMN AEROBIC: CPT

## 2024-08-08 PROCEDURE — 87635 SARS-COV-2 COVID-19 AMP PRB: CPT

## 2024-08-08 PROCEDURE — 36415 COLL VENOUS BLD VENIPUNCTURE: CPT

## 2024-08-08 PROCEDURE — 81025 URINE PREGNANCY TEST: CPT

## 2024-08-08 PROCEDURE — 6370000000 HC RX 637 (ALT 250 FOR IP): Performed by: EMERGENCY MEDICINE

## 2024-08-08 RX ORDER — ONDANSETRON 4 MG/1
4 TABLET, ORALLY DISINTEGRATING ORAL 3 TIMES DAILY PRN
Qty: 21 TABLET | Refills: 0 | Status: SHIPPED | OUTPATIENT
Start: 2024-08-08

## 2024-08-08 RX ORDER — ONDANSETRON 2 MG/ML
4 INJECTION INTRAMUSCULAR; INTRAVENOUS ONCE
Status: DISCONTINUED | OUTPATIENT
Start: 2024-08-08 | End: 2024-08-08

## 2024-08-08 RX ORDER — ONDANSETRON 4 MG/1
4 TABLET, ORALLY DISINTEGRATING ORAL ONCE
Status: COMPLETED | OUTPATIENT
Start: 2024-08-08 | End: 2024-08-08

## 2024-08-08 RX ORDER — CLINDAMYCIN HYDROCHLORIDE 300 MG/1
300 CAPSULE ORAL 4 TIMES DAILY
Qty: 40 CAPSULE | Refills: 0 | Status: SHIPPED | OUTPATIENT
Start: 2024-08-08 | End: 2024-08-18

## 2024-08-08 RX ADMIN — ONDANSETRON 4 MG: 4 TABLET, ORALLY DISINTEGRATING ORAL at 18:42

## 2024-08-08 ASSESSMENT — ENCOUNTER SYMPTOMS
BACK PAIN: 0
NAUSEA: 0
DIARRHEA: 0
COLOR CHANGE: 0
CONSTIPATION: 0
VOMITING: 0
SORE THROAT: 1
ABDOMINAL PAIN: 0
SHORTNESS OF BREATH: 0

## 2024-08-08 ASSESSMENT — PAIN SCALES - GENERAL: PAINLEVEL_OUTOF10: 4

## 2024-08-08 ASSESSMENT — PAIN - FUNCTIONAL ASSESSMENT: PAIN_FUNCTIONAL_ASSESSMENT: 0-10

## 2024-08-08 NOTE — TELEPHONE ENCOUNTER
Unfortunately I'm out of the office tomorrow and already over-booked on Friday. With the chills and abdominal pain I also would not want her to wait. I recommend she go to urgent care or the ER for this.     Thanks!

## 2024-08-08 NOTE — ED TRIAGE NOTES
Pt ambulatory in ED with c/o urinary frequency and abdominal wound that has puss in it. Pt said she had procedure to make a new belly button at [facility] and the incision in her abdomen is not healing and getting worse. Fever was 99.0 at home. Tylenol last at 1100.

## 2024-08-08 NOTE — ED PROVIDER NOTES
[facility] EMERGENCY DEPT  EMERGENCY DEPARTMENT ENCOUNTER      Pt Name: Cindy Ortega  MRN: [id number]  Birthdate 1975  Date of evaluation: 8/8/2024  Provider: Cresencio Boone MD    CHIEF COMPLAINT       Chief Complaint   Patient presents with    Urinary Frequency    Post-op Problem         HISTORY OF PRESENT ILLNESS   (Location/Symptom, Timing/Onset, Context/Setting, Quality, Duration, Modifying Factors, Severity)  Note limiting factors.   Cindy Ortega is a 48 y.o. female who presents to the emergency department      The history is provided by the patient and a friend. No  was used.       Nursing Notes were reviewed.    REVIEW OF SYSTEMS    (2-9 systems for level 4, 10 or more for level 5)     Review of Systems   Constitutional:  Positive for fever. Negative for activity change and chills.   HENT:  Positive for sore throat. Negative for nosebleeds.    Eyes:  Negative for visual disturbance.   Respiratory:  Negative for shortness of breath.    Cardiovascular:  Negative for chest pain and palpitations.   Gastrointestinal:  Negative for abdominal pain, constipation, diarrhea, nausea and vomiting.   Genitourinary:  Positive for urgency. Negative for difficulty urinating, dysuria and hematuria.   Musculoskeletal:  Negative for back pain, neck pain and neck stiffness.   Skin:  Positive for wound. Negative for color change.   Allergic/Immunologic: Negative for immunocompromised state.   Neurological:  Negative for dizziness, seizures, syncope, weakness, light-headedness, numbness and headaches.   Psychiatric/Behavioral:  Negative for behavioral problems, confusion, hallucinations, self-injury and suicidal ideas.        Except as noted above the remainder of the review of systems was reviewed and negative.       PAST MEDICAL HISTORY     Past Medical History:   Diagnosis Date    Abdominal pain 06/25/2017    Follows with Pain doctor    Adverse effect of anesthesia     WOKE DURING SURGERY    Anemia

## 2024-08-10 LAB
BACTERIA SPEC CULT: NORMAL
SERVICE CMNT-IMP: NORMAL

## 2024-08-21 ENCOUNTER — PATIENT MESSAGE (OUTPATIENT)
Facility: CLINIC | Age: 49
End: 2024-08-21

## 2024-08-27 NOTE — TELEPHONE ENCOUNTER
Called and talked with patient, going to set up appointment to be seen. If symptoms worsen go to ER for eval/treat.

## 2024-09-03 ENCOUNTER — OFFICE VISIT (OUTPATIENT)
Facility: CLINIC | Age: 49
End: 2024-09-03
Payer: COMMERCIAL

## 2024-09-03 VITALS
WEIGHT: 190 LBS | OXYGEN SATURATION: 100 % | HEIGHT: 62 IN | HEART RATE: 69 BPM | RESPIRATION RATE: 16 BRPM | DIASTOLIC BLOOD PRESSURE: 82 MMHG | BODY MASS INDEX: 34.96 KG/M2 | TEMPERATURE: 97.8 F | SYSTOLIC BLOOD PRESSURE: 130 MMHG

## 2024-09-03 DIAGNOSIS — L03.311 CELLULITIS, ABDOMINAL WALL: Primary | ICD-10-CM

## 2024-09-03 DIAGNOSIS — L03.311 CELLULITIS, ABDOMINAL WALL: ICD-10-CM

## 2024-09-03 DIAGNOSIS — N95.0 POSTMENOPAUSAL BLEEDING: ICD-10-CM

## 2024-09-03 PROBLEM — S82.892A: Status: RESOLVED | Noted: 2024-01-26 | Resolved: 2024-09-03

## 2024-09-03 PROBLEM — N39.0 URINARY TRACT INFECTION WITHOUT HEMATURIA: Status: RESOLVED | Noted: 2023-11-06 | Resolved: 2024-09-03

## 2024-09-03 PROCEDURE — 99214 OFFICE O/P EST MOD 30 MIN: CPT | Performed by: NURSE PRACTITIONER

## 2024-09-03 RX ORDER — DOXYCYCLINE HYCLATE 100 MG
100 TABLET ORAL 2 TIMES DAILY
Qty: 14 TABLET | Refills: 0 | Status: SHIPPED | OUTPATIENT
Start: 2024-09-03 | End: 2024-09-10

## 2024-09-03 ASSESSMENT — PATIENT HEALTH QUESTIONNAIRE - PHQ9
2. FEELING DOWN, DEPRESSED OR HOPELESS: NOT AT ALL
SUM OF ALL RESPONSES TO PHQ QUESTIONS 1-9: 0
1. LITTLE INTEREST OR PLEASURE IN DOING THINGS: NOT AT ALL
SUM OF ALL RESPONSES TO PHQ QUESTIONS 1-9: 0
SUM OF ALL RESPONSES TO PHQ9 QUESTIONS 1 & 2: 0
SUM OF ALL RESPONSES TO PHQ QUESTIONS 1-9: 0
SUM OF ALL RESPONSES TO PHQ QUESTIONS 1-9: 0
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
## 2024-09-08 LAB
BACTERIA SPEC AEROBE CULT: ABNORMAL
BACTERIA SPEC ANAEROBE CULT: ABNORMAL

## 2024-09-13 ENCOUNTER — PATIENT MESSAGE (OUTPATIENT)
Facility: CLINIC | Age: 49
End: 2024-09-13

## 2024-09-17 DIAGNOSIS — L03.311 CELLULITIS, ABDOMINAL WALL: Primary | ICD-10-CM

## 2024-09-17 RX ORDER — LINEZOLID 600 MG/1
600 TABLET, FILM COATED ORAL 2 TIMES DAILY
Qty: 28 TABLET | Refills: 0 | Status: SHIPPED | OUTPATIENT
Start: 2024-09-17 | End: 2024-10-01

## 2024-09-20 ENCOUNTER — TELEPHONE (OUTPATIENT)
Facility: CLINIC | Age: 49
End: 2024-09-20

## 2024-09-23 ENCOUNTER — APPOINTMENT (OUTPATIENT)
Facility: HOSPITAL | Age: 49
DRG: 603 | End: 2024-09-23
Payer: MEDICARE

## 2024-09-23 ENCOUNTER — HOSPITAL ENCOUNTER (INPATIENT)
Facility: HOSPITAL | Age: 49
LOS: 2 days | Discharge: HOME | DRG: 603 | End: 2024-09-25
Attending: EMERGENCY MEDICINE | Admitting: INTERNAL MEDICINE
Payer: MEDICARE

## 2024-09-23 DIAGNOSIS — L03.311 ABDOMINAL WALL CELLULITIS: Primary | ICD-10-CM

## 2024-09-23 LAB
ALBUMIN SERPL-MCNC: 3.4 G/DL (ref 3.5–5.2)
ALBUMIN/GLOB SERPL: 0.8 (ref 1.1–2.2)
ALP SERPL-CCNC: 228 U/L (ref 35–104)
ALT SERPL-CCNC: 19 U/L (ref 10–35)
ANION GAP SERPL CALC-SCNC: 11 MMOL/L (ref 2–12)
AST SERPL-CCNC: 45 U/L (ref 10–35)
BASOPHILS # BLD: 0 K/UL (ref 0–0.1)
BASOPHILS NFR BLD: 0 % (ref 0–1)
BILIRUB SERPL-MCNC: 0.6 MG/DL (ref 0.2–1)
BUN SERPL-MCNC: 27 MG/DL (ref 6–20)
BUN/CREAT SERPL: 34 (ref 12–20)
CALCIUM SERPL-MCNC: 8.8 MG/DL (ref 8.6–10)
CHLORIDE SERPL-SCNC: 102 MMOL/L (ref 98–107)
CO2 SERPL-SCNC: 23 MMOL/L (ref 22–29)
CREAT SERPL-MCNC: 0.79 MG/DL (ref 0.5–0.9)
DIFFERENTIAL METHOD BLD: ABNORMAL
EOSINOPHIL # BLD: 0 K/UL (ref 0–0.4)
EOSINOPHIL NFR BLD: 0 % (ref 0–7)
ERYTHROCYTE [DISTWIDTH] IN BLOOD BY AUTOMATED COUNT: 13.2 % (ref 11.5–14.5)
GLOBULIN SER CALC-MCNC: 4.3 G/DL (ref 2–4)
GLUCOSE SERPL-MCNC: 96 MG/DL (ref 65–100)
HCT VFR BLD AUTO: 41.4 % (ref 35–47)
HGB BLD-MCNC: 13.5 G/DL (ref 11.5–16)
IMM GRANULOCYTES # BLD AUTO: 0 K/UL
IMM GRANULOCYTES NFR BLD AUTO: 0 %
LACTATE SERPL-SCNC: 0.9 MMOL/L (ref 0.4–2)
LYMPHOCYTES # BLD: 3.9 K/UL (ref 0.8–3.5)
LYMPHOCYTES NFR BLD: 50 % (ref 12–49)
MCH RBC QN AUTO: 33.9 PG (ref 26–34)
MCHC RBC AUTO-ENTMCNC: 32.6 G/DL (ref 30–36.5)
MCV RBC AUTO: 104 FL (ref 80–99)
MONOCYTES # BLD: 0.5 K/UL (ref 0–1)
MONOCYTES NFR BLD: 6 % (ref 5–13)
NEUTS SEG # BLD: 3.5 K/UL (ref 1.8–8)
NEUTS SEG NFR BLD: 44 % (ref 32–75)
NRBC # BLD: 0 K/UL (ref 0–0.01)
NRBC BLD-RTO: 0 PER 100 WBC
PLATELET # BLD AUTO: 165 K/UL (ref 150–400)
PMV BLD AUTO: 10.8 FL (ref 8.9–12.9)
POTASSIUM SERPL-SCNC: 4.7 MMOL/L (ref 3.5–5.1)
PROT SERPL-MCNC: 7.7 G/DL (ref 6.4–8.3)
RBC # BLD AUTO: 3.98 M/UL (ref 3.8–5.2)
RBC MORPH BLD: ABNORMAL
SODIUM SERPL-SCNC: 136 MMOL/L (ref 136–145)
WBC # BLD AUTO: 7.9 K/UL (ref 3.6–11)
WBC MORPH BLD: ABNORMAL

## 2024-09-23 PROCEDURE — 83605 ASSAY OF LACTIC ACID: CPT

## 2024-09-23 PROCEDURE — 6360000004 HC RX CONTRAST MEDICATION: Performed by: EMERGENCY MEDICINE

## 2024-09-23 PROCEDURE — 87040 BLOOD CULTURE FOR BACTERIA: CPT

## 2024-09-23 PROCEDURE — 96366 THER/PROPH/DIAG IV INF ADDON: CPT

## 2024-09-23 PROCEDURE — 1100000000 HC RM PRIVATE

## 2024-09-23 PROCEDURE — 80053 COMPREHEN METABOLIC PANEL: CPT

## 2024-09-23 PROCEDURE — 36415 COLL VENOUS BLD VENIPUNCTURE: CPT

## 2024-09-23 PROCEDURE — 96365 THER/PROPH/DIAG IV INF INIT: CPT

## 2024-09-23 PROCEDURE — 99285 EMERGENCY DEPT VISIT HI MDM: CPT

## 2024-09-23 PROCEDURE — 74177 CT ABD & PELVIS W/CONTRAST: CPT

## 2024-09-23 PROCEDURE — 85025 COMPLETE CBC W/AUTO DIFF WBC: CPT

## 2024-09-23 PROCEDURE — 2580000003 HC RX 258: Performed by: EMERGENCY MEDICINE

## 2024-09-23 PROCEDURE — 6360000002 HC RX W HCPCS: Performed by: EMERGENCY MEDICINE

## 2024-09-23 RX ORDER — IOPAMIDOL 755 MG/ML
100 INJECTION, SOLUTION INTRAVASCULAR ONCE
Status: COMPLETED | OUTPATIENT
Start: 2024-09-23 | End: 2024-09-23

## 2024-09-23 RX ADMIN — VANCOMYCIN HYDROCHLORIDE 1000 MG: 1 INJECTION, POWDER, LYOPHILIZED, FOR SOLUTION INTRAVENOUS at 21:45

## 2024-09-23 RX ADMIN — VANCOMYCIN HYDROCHLORIDE 1000 MG: 1 INJECTION, POWDER, LYOPHILIZED, FOR SOLUTION INTRAVENOUS at 19:31

## 2024-09-23 RX ADMIN — IOPAMIDOL 100 ML: 755 INJECTION, SOLUTION INTRAVENOUS at 22:24

## 2024-09-23 ASSESSMENT — PAIN SCALES - GENERAL: PAINLEVEL_OUTOF10: 6

## 2024-09-23 ASSESSMENT — PAIN DESCRIPTION - LOCATION: LOCATION: ABDOMEN

## 2024-09-23 ASSESSMENT — PAIN - FUNCTIONAL ASSESSMENT: PAIN_FUNCTIONAL_ASSESSMENT: 0-10

## 2024-09-23 NOTE — ED PROVIDER NOTES
[facility] [place] EMERGENCY DEPT  EMERGENCY DEPARTMENT ENCOUNTER      Pt Name: Cindy Ortega  MRN: [id number]  Birthdate 1975  Date of evaluation: 9/23/2024  Provider: Roldan Bell MD    CHIEF COMPLAINT       Chief Complaint   Patient presents with    MRSA          HISTORY OF PRESENT ILLNESS   (Location/Symptom, Timing/Onset, Context/Setting, Quality, Duration, Modifying Factors, Severity)  Note limiting factors.   48-year-old with a history of right renal mass, Crohn's disease, prediabetes, obesity, chronic anemia, hyperlipidemia, seizure disorder, jejunostomy, pyelonephritis, sepsis, chronic pain, status post total colectomy.  She presents with complaints of \"MRSA\" from a periumbilical wound.  She states that she has been dealing with an infection in the area for the past several months.  She has been on multiple rounds of antibiotics.  It is flared up more recently.  She has noted pus coming from the wound recently.  She saw her PCP recently and was prescribed a 7-day course of doxycycline.  It did not help.  Her PCP was trying to set her up to get vancomycin or Zyvox, but the patient states that it has not been able to be arranged thus far.  She has felt worse over the past couple of days with a temperature elevation to 100.3 earlier today.  She has been taking Tylenol.  She feels fatigued and nauseated.          Review of External Medical Records:     Nursing Notes were reviewed.    REVIEW OF SYSTEMS    (2-9 systems for level 4, 10 or more for level 5)     Review of Systems    Except as noted above the remainder of the review of systems was reviewed and negative.       PAST MEDICAL HISTORY     Past Medical History:   Diagnosis Date    Abdominal pain 06/25/2017    Follows with Pain doctor    Adverse effect of anesthesia     WOKE DURING SURGERY    Anemia     In past    Anxiety     Arthritis     Blood clot in vein 2017    STOMACH    Cancer (HCC) 04/2021    Tumor/cyst on right kidney-ablation was successful     IR TUNNELED CATHETER PLACEMENT GREATER THAN 5 YEARS  11/25/2019    IR TUNNELED CATHETER PLACEMENT GREATER THAN 5 YEARS 11/25/2019 [facility]IO IR    IR TUNNELED CATHETER PLACEMENT GREATER THAN 5 YEARS  11/25/2019    LAP, INCISIONAL HERNIA REPAIR,INCARCERATED  09/10/2008    dr. caraballo    OTHER SURGICAL HISTORY      multiple procedures related to her Crohn's disease    OTHER SURGICAL HISTORY      ABDOMINAL SURGERY REMOVING COLON, 2/3 STOMACH, 1/3 SMALL INTESTINE    NE UNLISTED PROCEDURE ABDOMEN PERITONEUM & OMENTUM      SMALL INTESTINE SURGERY      TOTAL COLECTOMY      UPPER GASTROINTESTINAL ENDOSCOPY      US DRAINAGE ABDOM ABSCESS OPEN  07/09/2022    US DRAINAGE ABDOM ABSCESS OPEN 7/9/2022         CURRENT MEDICATIONS       Previous Medications    B COMPLEX VITAMINS CAPSULE    Take 1 capsule by mouth daily    DRONABINOL (MARINOL) 5 MG CAPSULE        EPINEPHRINE (EPIPEN) 0.3 MG/0.3ML SOAJ INJECTION    inject 0.3 milliliters ( 0.3 milligrams ) intramuscularly ONCE if...  (REFER TO PRESCRIPTION NOTES).    FAT EMULSION (INTRALIPID/NUTRILIPID) 20 % EMUL INFUSION    Infuse 250 mLs intravenously Twice a Week    FERROUS GLUCONATE IRON PO        HEPARIN FLUSH 100 UNIT/ML INJECTION    Infuse intravenously daily    HUMIRA PEN 40 MG/0.4ML PNKT        LINEZOLID (ZYVOX) 600 MG TABLET    Take 1 tablet by mouth 2 times daily for 14 days    LOPERAMIDE (IMODIUM) 2 MG CAPSULE    take 3 capsules by mouth four times a day if needed for diarrhea    LORATADINE (CLARITIN) 10 MG TABLET    take 1 tablet by mouth daily at bedtime for SEASONAL allergies    MIDODRINE (PROAMATINE) 5 MG TABLET    Take 1 tablet by mouth 3 times daily (with meals)    MORPHINE (MSIR) 30 MG TABLET    take 1 tablet by mouth twice a day if needed for severe pain (OK 5/16)    MULTIPLE VITAMINS-MINERALS (ALIVE DIABETIC MULTIVITAMIN PO)        NALOXONE (NARCAN) 4 MG/0.1ML LIQD NASAL SPRAY    Narcan 4 mg/actuation nasal spray (spray 1 actuation via nasal for opiod overdose)

## 2024-09-23 NOTE — ED TRIAGE NOTES
Patient arrives ambulatory to ED. Patient with c/o ,MRSA infection around naval. Patient states she was diagnosed 7 days ago but has not been able to get her antibiotic. VSS.

## 2024-09-23 NOTE — ED NOTES
First set of blood cultures obtained using ultrasound guidance d/t difficulty obtaining IV access

## 2024-09-24 PROCEDURE — 2500000003 HC RX 250 WO HCPCS: Performed by: STUDENT IN AN ORGANIZED HEALTH CARE EDUCATION/TRAINING PROGRAM

## 2024-09-24 PROCEDURE — 6360000002 HC RX W HCPCS: Performed by: INTERNAL MEDICINE

## 2024-09-24 PROCEDURE — 87185 SC STD ENZYME DETCJ PER NZM: CPT

## 2024-09-24 PROCEDURE — 2580000003 HC RX 258: Performed by: INTERNAL MEDICINE

## 2024-09-24 PROCEDURE — 87070 CULTURE OTHR SPECIMN AEROBIC: CPT

## 2024-09-24 PROCEDURE — 6370000000 HC RX 637 (ALT 250 FOR IP): Performed by: INTERNAL MEDICINE

## 2024-09-24 PROCEDURE — 87077 CULTURE AEROBIC IDENTIFY: CPT

## 2024-09-24 PROCEDURE — 6360000002 HC RX W HCPCS: Performed by: STUDENT IN AN ORGANIZED HEALTH CARE EDUCATION/TRAINING PROGRAM

## 2024-09-24 PROCEDURE — 2580000003 HC RX 258: Performed by: STUDENT IN AN ORGANIZED HEALTH CARE EDUCATION/TRAINING PROGRAM

## 2024-09-24 PROCEDURE — 87147 CULTURE TYPE IMMUNOLOGIC: CPT

## 2024-09-24 PROCEDURE — 6370000000 HC RX 637 (ALT 250 FOR IP): Performed by: STUDENT IN AN ORGANIZED HEALTH CARE EDUCATION/TRAINING PROGRAM

## 2024-09-24 PROCEDURE — 87205 SMEAR GRAM STAIN: CPT

## 2024-09-24 PROCEDURE — 1100000000 HC RM PRIVATE

## 2024-09-24 PROCEDURE — 87186 SC STD MICRODIL/AGAR DIL: CPT

## 2024-09-24 PROCEDURE — 94761 N-INVAS EAR/PLS OXIMETRY MLT: CPT

## 2024-09-24 RX ORDER — DRONABINOL 2.5 MG/1
5 CAPSULE ORAL DAILY
Status: DISCONTINUED | OUTPATIENT
Start: 2024-09-24 | End: 2024-09-25 | Stop reason: HOSPADM

## 2024-09-24 RX ORDER — MAGNESIUM SULFATE IN WATER 40 MG/ML
2000 INJECTION, SOLUTION INTRAVENOUS PRN
Status: DISCONTINUED | OUTPATIENT
Start: 2024-09-24 | End: 2024-09-25 | Stop reason: HOSPADM

## 2024-09-24 RX ORDER — SODIUM CHLORIDE 9 MG/ML
INJECTION, SOLUTION INTRAVENOUS PRN
Status: DISCONTINUED | OUTPATIENT
Start: 2024-09-24 | End: 2024-09-25 | Stop reason: HOSPADM

## 2024-09-24 RX ORDER — ACETAMINOPHEN 325 MG/1
650 TABLET ORAL EVERY 6 HOURS PRN
Status: DISCONTINUED | OUTPATIENT
Start: 2024-09-24 | End: 2024-09-25 | Stop reason: HOSPADM

## 2024-09-24 RX ORDER — ESCITALOPRAM OXALATE 10 MG/1
10 TABLET ORAL DAILY
COMMUNITY

## 2024-09-24 RX ORDER — MIDODRINE HYDROCHLORIDE 5 MG/1
5 TABLET ORAL
Status: DISCONTINUED | OUTPATIENT
Start: 2024-09-24 | End: 2024-09-25 | Stop reason: HOSPADM

## 2024-09-24 RX ORDER — LOPERAMIDE HCL 2 MG
4 CAPSULE ORAL 4 TIMES DAILY
Status: DISCONTINUED | OUTPATIENT
Start: 2024-09-24 | End: 2024-09-25 | Stop reason: HOSPADM

## 2024-09-24 RX ORDER — MORPHINE SULFATE 15 MG/1
30 TABLET ORAL 2 TIMES DAILY PRN
Status: DISCONTINUED | OUTPATIENT
Start: 2024-09-24 | End: 2024-09-25 | Stop reason: HOSPADM

## 2024-09-24 RX ORDER — POLYETHYLENE GLYCOL 3350 17 G/17G
17 POWDER, FOR SOLUTION ORAL DAILY PRN
Status: DISCONTINUED | OUTPATIENT
Start: 2024-09-24 | End: 2024-09-25 | Stop reason: HOSPADM

## 2024-09-24 RX ORDER — SODIUM CHLORIDE 9 MG/ML
INJECTION, SOLUTION INTRAVENOUS CONTINUOUS
Status: DISPENSED | OUTPATIENT
Start: 2024-09-24 | End: 2024-09-24

## 2024-09-24 RX ORDER — ONDANSETRON 2 MG/ML
4 INJECTION INTRAMUSCULAR; INTRAVENOUS EVERY 6 HOURS PRN
Status: DISCONTINUED | OUTPATIENT
Start: 2024-09-24 | End: 2024-09-25 | Stop reason: HOSPADM

## 2024-09-24 RX ORDER — LOPERAMIDE HCL 2 MG
4 CAPSULE ORAL 4 TIMES DAILY
Status: DISCONTINUED | OUTPATIENT
Start: 2024-09-24 | End: 2024-09-24

## 2024-09-24 RX ORDER — ONDANSETRON 4 MG/1
4 TABLET, ORALLY DISINTEGRATING ORAL EVERY 8 HOURS PRN
Status: DISCONTINUED | OUTPATIENT
Start: 2024-09-24 | End: 2024-09-25 | Stop reason: HOSPADM

## 2024-09-24 RX ORDER — ENOXAPARIN SODIUM 100 MG/ML
40 INJECTION SUBCUTANEOUS DAILY
Status: DISCONTINUED | OUTPATIENT
Start: 2024-09-24 | End: 2024-09-25 | Stop reason: HOSPADM

## 2024-09-24 RX ORDER — ACETAMINOPHEN 650 MG/1
650 SUPPOSITORY RECTAL EVERY 6 HOURS PRN
Status: DISCONTINUED | OUTPATIENT
Start: 2024-09-24 | End: 2024-09-25 | Stop reason: HOSPADM

## 2024-09-24 RX ORDER — SODIUM CHLORIDE 0.9 % (FLUSH) 0.9 %
5-40 SYRINGE (ML) INJECTION EVERY 12 HOURS SCHEDULED
Status: DISCONTINUED | OUTPATIENT
Start: 2024-09-24 | End: 2024-09-25 | Stop reason: HOSPADM

## 2024-09-24 RX ORDER — SODIUM CHLORIDE 0.9 % (FLUSH) 0.9 %
5-40 SYRINGE (ML) INJECTION PRN
Status: DISCONTINUED | OUTPATIENT
Start: 2024-09-24 | End: 2024-09-25 | Stop reason: HOSPADM

## 2024-09-24 RX ORDER — ESCITALOPRAM OXALATE 10 MG/1
10 TABLET ORAL DAILY
Status: DISCONTINUED | OUTPATIENT
Start: 2024-09-24 | End: 2024-09-25 | Stop reason: HOSPADM

## 2024-09-24 RX ORDER — PANTOPRAZOLE SODIUM 40 MG/10ML
40 INJECTION, POWDER, LYOPHILIZED, FOR SOLUTION INTRAVENOUS DAILY
Status: DISCONTINUED | OUTPATIENT
Start: 2024-09-24 | End: 2024-09-25 | Stop reason: HOSPADM

## 2024-09-24 RX ORDER — LOPERAMIDE HCL 2 MG
8 CAPSULE ORAL 4 TIMES DAILY
Status: DISCONTINUED | OUTPATIENT
Start: 2024-09-24 | End: 2024-09-24

## 2024-09-24 RX ADMIN — SODIUM CHLORIDE, PRESERVATIVE FREE 10 ML: 5 INJECTION INTRAVENOUS at 08:45

## 2024-09-24 RX ADMIN — MORPHINE SULFATE 30 MG: 15 TABLET ORAL at 20:55

## 2024-09-24 RX ADMIN — ONDANSETRON 4 MG: 2 INJECTION INTRAMUSCULAR; INTRAVENOUS at 02:24

## 2024-09-24 RX ADMIN — SODIUM CHLORIDE 1250 MG: 9 INJECTION, SOLUTION INTRAVENOUS at 21:58

## 2024-09-24 RX ADMIN — SODIUM CHLORIDE, PRESERVATIVE FREE 10 ML: 5 INJECTION INTRAVENOUS at 20:35

## 2024-09-24 RX ADMIN — MIDODRINE HYDROCHLORIDE 5 MG: 5 TABLET ORAL at 18:14

## 2024-09-24 RX ADMIN — SODIUM CHLORIDE: 9 INJECTION, SOLUTION INTRAVENOUS at 06:05

## 2024-09-24 RX ADMIN — DRONABINOL 5 MG: 2.5 CAPSULE ORAL at 08:55

## 2024-09-24 RX ADMIN — MIDODRINE HYDROCHLORIDE 5 MG: 5 TABLET ORAL at 08:31

## 2024-09-24 RX ADMIN — SODIUM CHLORIDE 1250 MG: 9 INJECTION, SOLUTION INTRAVENOUS at 10:52

## 2024-09-24 RX ADMIN — SODIUM CHLORIDE 3000 MG: 900 INJECTION INTRAVENOUS at 13:37

## 2024-09-24 RX ADMIN — MORPHINE SULFATE 30 MG: 15 TABLET ORAL at 08:31

## 2024-09-24 RX ADMIN — ZINC SULFATE: 25 INJECTION, SOLUTION INTRAVENOUS at 18:08

## 2024-09-24 RX ADMIN — SODIUM CHLORIDE 3000 MG: 900 INJECTION INTRAVENOUS at 20:33

## 2024-09-24 RX ADMIN — SODIUM CHLORIDE: 9 INJECTION, SOLUTION INTRAVENOUS at 13:35

## 2024-09-24 RX ADMIN — ESCITALOPRAM OXALATE 10 MG: 10 TABLET ORAL at 08:31

## 2024-09-24 RX ADMIN — ACETAMINOPHEN 650 MG: 325 TABLET ORAL at 19:04

## 2024-09-24 RX ADMIN — SODIUM CHLORIDE, PRESERVATIVE FREE 10 ML: 5 INJECTION INTRAVENOUS at 08:39

## 2024-09-24 RX ADMIN — MIDODRINE HYDROCHLORIDE 5 MG: 5 TABLET ORAL at 12:20

## 2024-09-24 RX ADMIN — SODIUM CHLORIDE 3000 MG: 900 INJECTION INTRAVENOUS at 08:45

## 2024-09-24 RX ADMIN — LOPERAMIDE HYDROCHLORIDE 4 MG: 2 CAPSULE ORAL at 19:04

## 2024-09-24 RX ADMIN — ACETAMINOPHEN 650 MG: 325 TABLET ORAL at 10:52

## 2024-09-24 RX ADMIN — ENOXAPARIN SODIUM 40 MG: 100 INJECTION SUBCUTANEOUS at 08:32

## 2024-09-24 RX ADMIN — PANTOPRAZOLE SODIUM 40 MG: 40 INJECTION, POWDER, FOR SOLUTION INTRAVENOUS at 08:32

## 2024-09-24 ASSESSMENT — PAIN DESCRIPTION - LOCATION
LOCATION: BACK
LOCATION: BACK
LOCATION: ABDOMEN
LOCATION: BACK

## 2024-09-24 ASSESSMENT — PAIN DESCRIPTION - ORIENTATION
ORIENTATION: POSTERIOR
ORIENTATION: LOWER

## 2024-09-24 ASSESSMENT — PAIN SCALES - GENERAL
PAINLEVEL_OUTOF10: 7
PAINLEVEL_OUTOF10: 5
PAINLEVEL_OUTOF10: 1
PAINLEVEL_OUTOF10: 4
PAINLEVEL_OUTOF10: 3
PAINLEVEL_OUTOF10: 4

## 2024-09-24 ASSESSMENT — PAIN DESCRIPTION - DESCRIPTORS: DESCRIPTORS: ACHING

## 2024-09-24 NOTE — PLAN OF CARE
Problem: Discharge Planning  Goal: Discharge to home or other facility with appropriate resources  Outcome: /HSPC Progressing     Problem: Pain  Goal: Verbalizes/displays adequate comfort level or baseline comfort level  9/24/2024 1310 by Maria D Smith LPN  Outcome: /HSPC Progressing  9/24/2024 0352 by Leeanna Gimenez RN  Outcome: Progressing     Problem: Chronic Conditions and Co-morbidities  Goal: Patient's chronic conditions and co-morbidity symptoms are monitored and maintained or improved  9/24/2024 1310 by Maria D Smith LPN  Outcome: /HSPC Progressing  9/24/2024 0352 by Leeanna Gimenez RN  Outcome: Progressing     Problem: Safety - Adult  Goal: Free from fall injury  9/24/2024 1310 by Maria D Smith LPN  Outcome: /HSPC Progressing  9/24/2024 0352 by Leeanna Gimenez RN  Outcome: Progressing     Problem: Nutrition Deficit:  Goal: Optimize nutritional status  Outcome: HH/HSPC Progressing

## 2024-09-24 NOTE — PROGRESS NOTES
Occupational Therapy    Chart reviewed in preparation for OT evaluation.  Consulted with PT, who advised that patient is at independent baseline.  OT evaluation is not indicated.  Will complete OT order.    Valdemar Santillan, OTR/L\
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
## 2024-09-24 NOTE — PROGRESS NOTES
Spoke to Dr Castro regarding this patient. Very difficult stick, Port was accessed prior to arrival and dressing change completed yesterday. Provider states it is ok to use patient's mediport.

## 2024-09-24 NOTE — PROGRESS NOTES
[facility] Dosing Services: Antimicrobial Stewardship Daily Doc  Consult for antibiotic dosing of vanc by Dr. Bustamante  Indication: SSTI abdominal wall  Day of Therapy: 2  Failed Doxy OP, didn't start Linezolid d/t DDI?    Ht Readings from Last 1 Encounters:   09/23/24 1.575 m (5' 2\")        Wt Readings from Last 1 Encounters:   09/23/24 83.5 kg (184 lb)      Vancomycin therapy:  Loading dose: Vancomycin 2000 mg x1 dose now/given  Maintenance dose: Vancomycin 1250 mg IV every 12 hours   Dose calculated to approximate a           a. Target AUC/TREY of 400-600          b. Trough of 15-20 mcg/mL   Last level:  mcg/mL  A/Plan: WBC 7.9 on 9/23, Scr 0.79 on 9/23. Start vanc 1.25g q12h (~auc 585). Vanc R ordered for jesus manuel AM. BMP ordered for jesus manuel by MD.        Other Antimicrobial   (not dosed by pharmacist)   Unasyn    Cultures 9/24: wound:   9/23: blood x2:  9/3: anaerobic/aerboic: anaerobic & MRSA - F     Serum Creatinine Lab Results   Component Value Date/Time    CREATININE 0.79 09/23/2024 06:21 PM          Creatinine Clearance Estimated Creatinine Clearance: 89 mL/min (based on SCr of 0.79 mg/dL).     Temp Temp: 98.2 °F (36.8 °C) (Oral)       WBC Lab Results   Component Value Date/Time    WBC 7.9 09/23/2024 06:21 PM          Procalcitonin Lab Results   Component Value Date/Time    PROCAL 33.47 11/05/2023 07:22 PM      For Antifungals, Metronidazole and Nafcillin: Lab Results   Component Value Date/Time    ALT 19 09/23/2024 06:21 PM    AST 45 09/23/2024 06:21 PM        Pharmacist: Xin Patel, PharmD, BCPS  [phone]
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
## 2024-09-24 NOTE — H&P
[facility] [place][note 2]  [address] [place], [place]  [id number] [phone]    Hospital Medicine History and Physical      NAME:       Cindy Ortega   :       1975   MRN:      [id number]     Date of service:   2024     Chief  Complaint:  Abdominal wall infection     History Of Presenting Illness:       Ms. Ortega is a 48 y.o. female who is being admitted for cellulitis of abdominal wall. Ms. Ortega presented to our [facility] today complaining of abdominal wall pain and infection for the past week. She says she has had MRSA infection and had previously been on several antibiotics but despite that, she noted a purulent discharge in the recent past. Seen by her PCP who started her on Doxycycline that did not help. She was to start taking Zyvox but this was noted to have severe interaction with her other medications. Her symptoms worsened prompting her to seek medical attention. A CT scan abdomen done showed a marked cellulitis of the anterior abdomen. No evidence for gas gangrene or abscess. Right upper renal pole mass for which three-phase CT is recommended electively to further characterize, to differentiate between a hyperdense cyst in early renal cell carcinoma. Stable right lower renal pole mass. Incidental stable right inferior renal mass, bilateral chest wall skin lesions, chronic calcific pancreatitis, cystitis, stable thoracic and lumbar compression fractures. She was admitted for further management    Allergies   Allergen Reactions    Macadamia Nut Oil Anaphylaxis    Meperidine Rash     Other reaction(s): Unknown (comments)  Other reaction(s): SEVERE NAUSEA  Reaction Type: Allergy    Sulfa Antibiotics Anaphylaxis    Nisoldipine     Sumatriptan Nausea Only    Carisoprodol Nausea Only, Other (See Comments) and Rash     Other  reaction(s): RASH, ITCHING  Reaction Type: Allergy; Reaction(s): hives and itching    Ketorolac Tromethamine Nausea And Vomiting       Prior to Admission medications    Medication Sig Start Date End Date Taking? Authorizing Provider   linezolid (ZYVOX) 600 MG tablet Take 1 tablet by mouth 2 times daily for 14 days 9/17/24 10/1/24  Ngoc Jimenez, APRN - NP   ondansetron (ZOFRAN-ODT) 4 MG disintegrating tablet Take 1 tablet by mouth 3 times daily as needed for Nausea or Vomiting 8/8/24   Cresencio Boone MD   midodrine (PROAMATINE) 5 MG tablet Take 1 tablet by mouth 3 times daily (with meals) 6/18/24   Yanira Raymond MD   morphine (MSIR) 30 MG tablet take 1 tablet by mouth twice a day if needed for severe pain (OK 5/16) 5/16/24   Sydnie Haji MD   loratadine (CLARITIN) 10 MG tablet take 1 tablet by mouth daily at bedtime for SEASONAL allergies 5/1/24   Sydnie Haji MD   fat emulsion (INTRALIPID/NUTRILIPID) 20 % EMUL infusion Infuse 250 mLs intravenously Twice a Week  Patient taking differently: Infuse 250 mLs intravenously every other day 1/1/24   Seamus Guthrie MD   pantoprazole (PROTONIX) 40 MG injection Infuse 40 mg intravenously daily    Sydnie Haji MD   b complex vitamins capsule Take 1 capsule by mouth daily    Sydnie Haji MD   dronabinol (MARINOL) 5 MG capsule  1/1/17   Sydnie Haji MD   FERROUS GLUCONATE IRON PO  7/1/21   Sydnie Haji MD   Multiple Vitamins-Minerals (ALIVE DIABETIC MULTIVITAMIN PO)  10/22/20   Sydnie Haji MD   HUMIRA PEN 40 MG/0.4ML PNKT  9/25/23   Sydnie Haji MD   Parenteral Electrolytes (TPN ELECTROLYTES FTV IV) TPN Electrolytes    Sydnie Haji MD   loperamide (IMODIUM) 2 MG capsule take 3 capsules by mouth four times a day if needed for diarrhea 5/23/23   Gregg Armenta MD   EPINEPHrine (EPIPEN) 0.3 MG/0.3ML SOAJ injection inject 0.3 milliliters ( 0.3 milligrams ) intramuscularly ONCE    Neurological: Awake and alert, speech is clear, CNs 2-12 are grossly intact and otherwise non focal  Psychiatric:  Has a good insight and is oriented x 3  ________________________________________________________________________    Data Review: I have reviewed reports and independently interpreted the following  diagnostic tests    Diagnostic testing:    Laboratory data reviewed and independently interpreted:    Recent Labs     09/23/24  1821   WBC 7.9   HGB 13.5   HCT 41.4   RBC 3.98   .0*   MCH 33.9        No results found for: \"LACTA\"  Recent Labs     09/23/24  1821      K 4.7      CO2 23   GLUCOSE 96   BUN 27*   CREATININE 0.79   CALCIUM 8.8   BILITOT 0.6   ALKPHOS 228*   AST 45*   ALT 19     No components found for: \"GLUCOSEPOC\"  Lab Results   Component Value Date/Time    CHOL 204 11/02/2020 02:50 PM    TRIG 74 12/28/2023 05:20 AM    HDL 58 11/02/2020 02:50 PM     Imaging data reviewed:    CT ABDOMEN PELVIS W IV CONTRAST Additional Contrast? None    Result Date: 9/23/2024  Marked cellulitis of the anterior abdomen. No evidence for gas gangrene or abscess Right upper renal pole mass for which three-phase CT is recommended electively to further characterize, to differentiate between a hyperdense cyst in early renal cell carcinoma Stable right lower renal pole mass. Incidental stable right inferior renal mass, bilateral chest wall skin lesions, chronic calcific pancreatitis, cystitis, stable thoracic and lumbar compression fractures for which DXA is recommended Electronically signed by Yvette Yan    I have also reviewed available old medical records.     Assessment & Impression:     Ms. Ortega is a 48 y.o. female being evaluated for:     Principal Problem:    Cellulitis of abdominal wall  Active Problems:    Right renal mass    Crohn's disease (HCC)    Morbid obesity (HCC)    Pure hypercholesterolemia    Seizure disorder (HCC)    Jejunostomy present (HCC)    On total parenteral

## 2024-09-24 NOTE — ED NOTES
TRANSFER - OUT REPORT:    Verbal report given to Leeanna LARRY on Cindy Ortega  being transferred to [facility] for routine progression of patient care       Report consisted of patient's Situation, Background, Assessment and   Recommendations(SBAR).     Information from the following report(s) ED SBAR, MAR, and Recent Results was reviewed with the receiving nurse.    Brashear Fall Assessment:    Presents to emergency department  because of falls (Syncope, seizure, or loss of consciousness): No  Age > 70: No  Altered Mental Status, Intoxication with alcohol or substance confusion (Disorientation, impaired judgment, poor safety awaremess, or inability to follow instructions): No  Impaired Mobility: Ambulates or transfers with assistive devices or assistance; Unable to ambulate or transer.: No  Nursing Judgement: No          Lines:   Peripheral IV 09/23/24 Left;Proximal;Ventral Forearm (Active)       Peripheral IV 09/23/24 Right Antecubital (Active)   Site Assessment Clean, dry & intact 09/23/24 9090        Opportunity for questions and clarification was provided.      Patient transported with:  [employer]

## 2024-09-24 NOTE — PROGRESS NOTES
[facility]  [address] [place] [address] [phone]        Hospitalist Progress Note      NAME: Cindy Ortega   :  1975  MRM:  [id number]    Date/Time of service: 2024  10:45 AM       Subjective:     Chief Complaint:  Patient was personally seen and examined by me during this time period.  Chart reviewed.  F/up abdominal wall cellulitis with abscess and cellulitis    Feeling better this am. Less pain and redness per patient       Objective:       Vitals:       Last 24hrs VS reviewed since prior progress note. Most recent are:    Vitals:    24 0844   BP: (!) 105/59   Pulse: 59   Resp: 16   Temp: 97.9 °F (36.6 °C)   SpO2: 97%     SpO2 Readings from Last 6 Encounters:   24 97%   24 100%   24 97%   24 98%   24 100%   23 100%          Intake/Output Summary (Last 24 hours) at 2024 1045  Last data filed at 2024 0059  Gross per 24 hour   Intake 517.94 ml   Output --   Net 517.94 ml        Exam:     Physical Exam:    Gen:  Well-developed, well-nourished, in no acute distress  HEENT:  Pink conjunctivae, EOMI, hearing intact to voice, moist mucous membranes  Resp:  No accessory muscle use, clear breath sounds without wheezes rales or rhonchi  Card:  No murmurs, normal S1, S2 without thrills, bruits or peripheral edema  Abd:  cca 2 cm diameter open abscess with surrounding cellulitis  Musc:  No cyanosis or clubbing  Skin:  No rashes or ulcers, skin turgor is good  Neuro:  follows commands appropriately  Psych:  Good insight, oriented to person, place and time, alert      Medications Reviewed: (see below)    Lab Data Reviewed: (see below)    ______________________________________________________________________    Medications:     Current Facility-Administered Medications   Medication Dose Route Frequency    sodium chloride flush 0.9 % injection 5-40 mL  5-40 mL IntraVENous 2 times per day    sodium chloride flush  0.9 % injection 5-40 mL  5-40 mL IntraVENous PRN    0.9 % sodium chloride infusion   IntraVENous PRN    magnesium sulfate 2000 mg in 50 mL IVPB premix  2,000 mg IntraVENous PRN    ondansetron (ZOFRAN-ODT) disintegrating tablet 4 mg  4 mg Oral Q8H PRN    Or    ondansetron (ZOFRAN) injection 4 mg  4 mg IntraVENous Q6H PRN    polyethylene glycol (GLYCOLAX) packet 17 g  17 g Oral Daily PRN    acetaminophen (TYLENOL) tablet 650 mg  650 mg Oral Q6H PRN    Or    acetaminophen (TYLENOL) suppository 650 mg  650 mg Rectal Q6H PRN    0.9 % sodium chloride infusion   IntraVENous Continuous    enoxaparin (LOVENOX) injection 40 mg  40 mg SubCUTAneous Daily    droNABinol (MARINOL) capsule 5 mg  5 mg Oral Daily    escitalopram (LEXAPRO) tablet 10 mg  10 mg Oral Daily    midodrine (PROAMATINE) tablet 5 mg  5 mg Oral TID WC    morphine (MSIR) tablet 30 mg  30 mg Oral BID PRN    pantoprazole (PROTONIX) injection 40 mg  40 mg IntraVENous Daily    ampicillin-sulbactam (UNASYN) 3,000 mg in sodium chloride 0.9 % 100 mL IVPB (mini-bag)  3,000 mg IntraVENous Q6H    vancomycin (VANCOCIN) 1,250 mg in sodium chloride 0.9 % 250 mL IVPB (Rudw0Wgb)  1,250 mg IntraVENous Q12H    [START ON 9/25/2024] Vanc random 09/25 0600   Other Once          Lab Review:     Recent Labs     09/23/24  1821   WBC 7.9   HGB 13.5   HCT 41.4        Recent Labs     09/23/24  1821      K 4.7      CO2 23   BUN 27*   ALT 19     No results found for: \"GLUCPOC\"       Assessment / Plan:     Cellulitis with abscess of abdominal wall: POA. Acute. failed outpatient treatments and purulent. Hx + MRSA 9/3/24. Improving  - cont vanc  - add unasyn for now  - IV dilaudid PRN  - wound cx  - Likely discharge change to PO bactrim or doxy tomorrow and then discharge the next day if not worse     Crohn's disease / S/P total colectomy / Jejunostomy present / On total parenteral nutrition (TPN) POA: chronic  - Hold Humira.  - Continue Morphine PRN  - Consult nutritionist

## 2024-09-24 NOTE — PROGRESS NOTES
Physical Therapy Note:     Chart reviewed in preparation for PT evaluation.  Spoke with the patient, who reported independence for mobility and ADLs. She was ambulatory to the ED and was received fully dressed during discussion. No needs identified and she deferred evaluation d/t baseline functional mobility. Will complete PT order.  Thank you,  Roldan Christopher, PT

## 2024-09-24 NOTE — PROGRESS NOTES
Urology consulted for right renal mass.  She is a patient of [facility] and is followed for this by urology there.  She has been seen for right renal mass previously .    CT abdomen pelvis with contrast from this admission personally reviewed and as follows:    IMPRESSION:  Marked cellulitis of the anterior abdomen. No evidence for gas gangrene or  abscess  Right upper renal pole mass for which three-phase CT is recommended electively  to further characterize, to differentiate between a hyperdense cyst in early  renal cell carcinoma   Stable right lower renal pole mass.  Incidental stable right inferior renal mass, bilateral chest wall skin lesions,  chronic calcific pancreatitis, cystitis, stable thoracic and lumbar compression  fractures for which DXA is recommended        Plan:   Known right renal mass, questionable new renal mass versus renal cyst  -Follow-up with her primary urologist at [facility].  No inpatient interventions planned                    Last  office note by Dr. Solano 2019 as follows:    \"  Cindy Ortega is a 43 year old female who presents today for \"discuss renal mass\". She returns for follow-up. Context: She has a complicated history of Crohn's disease with multiple bowel fistulas requiring more than 20 surgeries by Dr. William per her report.  She has open abdomen with active fistula with pouch around it.  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  She has been on TPN, chronic.  She also eats by mouth but has a high output fistula.  She had seen Dr. Han for a 4 cm new right lower pole renal mass discovered on a recent CT in November 2019.  Dr. Han had compared with older films and the mass is new and concerning for malignancy.  Location: Right kidney  Duration: November 2019  Associated symptoms: She is wheelchair-bound and has morbid obesity.  She comes with her mother    PAST MEDICAL HISTORY:    Allergies: SOMA (CARISOPRODOL) (Critical)  SULFA

## 2024-09-25 VITALS
SYSTOLIC BLOOD PRESSURE: 98 MMHG | OXYGEN SATURATION: 99 % | WEIGHT: 184 LBS | DIASTOLIC BLOOD PRESSURE: 49 MMHG | RESPIRATION RATE: 17 BRPM | HEIGHT: 62 IN | HEART RATE: 58 BPM | TEMPERATURE: 98.1 F | BODY MASS INDEX: 33.86 KG/M2

## 2024-09-25 LAB
ALBUMIN SERPL-MCNC: 2.4 G/DL (ref 3.5–5)
ALBUMIN/GLOB SERPL: 0.8 (ref 1.1–2.2)
ALP SERPL-CCNC: 158 U/L (ref 45–117)
ALT SERPL-CCNC: 21 U/L (ref 12–78)
ANION GAP SERPL CALC-SCNC: 1 MMOL/L (ref 2–12)
AST SERPL-CCNC: 10 U/L (ref 15–37)
BASOPHILS # BLD: 0 K/UL (ref 0–0.1)
BASOPHILS NFR BLD: 0 % (ref 0–1)
BILIRUB SERPL-MCNC: 0.5 MG/DL (ref 0.2–1)
BUN SERPL-MCNC: 23 MG/DL (ref 6–20)
BUN/CREAT SERPL: 38 (ref 12–20)
CALCIUM SERPL-MCNC: 8.3 MG/DL (ref 8.5–10.1)
CHLORIDE SERPL-SCNC: 112 MMOL/L (ref 97–108)
CO2 SERPL-SCNC: 28 MMOL/L (ref 21–32)
CREAT SERPL-MCNC: 0.61 MG/DL (ref 0.55–1.02)
DIFFERENTIAL METHOD BLD: ABNORMAL
EOSINOPHIL # BLD: 0 K/UL (ref 0–0.4)
EOSINOPHIL NFR BLD: 1 % (ref 0–7)
ERYTHROCYTE [DISTWIDTH] IN BLOOD BY AUTOMATED COUNT: 13.5 % (ref 11.5–14.5)
GLOBULIN SER CALC-MCNC: 3.2 G/DL (ref 2–4)
GLUCOSE SERPL-MCNC: 116 MG/DL (ref 65–100)
HCT VFR BLD AUTO: 32.9 % (ref 35–47)
HGB BLD-MCNC: 10.6 G/DL (ref 11.5–16)
IMM GRANULOCYTES # BLD AUTO: 0 K/UL
IMM GRANULOCYTES NFR BLD AUTO: 0 %
LYMPHOCYTES # BLD: 1.6 K/UL (ref 0.8–3.5)
LYMPHOCYTES NFR BLD: 43 % (ref 12–49)
MCH RBC QN AUTO: 34.5 PG (ref 26–34)
MCHC RBC AUTO-ENTMCNC: 32.2 G/DL (ref 30–36.5)
MCV RBC AUTO: 107.2 FL (ref 80–99)
MONOCYTES # BLD: 0.2 K/UL (ref 0–1)
MONOCYTES NFR BLD: 6 % (ref 5–13)
NEUTS SEG # BLD: 2 K/UL (ref 1.8–8)
NEUTS SEG NFR BLD: 50 % (ref 32–75)
NRBC # BLD: 0 K/UL (ref 0–0.01)
NRBC BLD-RTO: 0 PER 100 WBC
PHOSPHATE SERPL-MCNC: 3.5 MG/DL (ref 2.6–4.7)
PLATELET # BLD AUTO: 103 K/UL (ref 150–400)
PMV BLD AUTO: 10.3 FL (ref 8.9–12.9)
POTASSIUM SERPL-SCNC: 4.4 MMOL/L (ref 3.5–5.1)
PROT SERPL-MCNC: 5.6 G/DL (ref 6.4–8.2)
RBC # BLD AUTO: 3.07 M/UL (ref 3.8–5.2)
RBC MORPH BLD: ABNORMAL
SODIUM SERPL-SCNC: 141 MMOL/L (ref 136–145)
TRIGL SERPL-MCNC: 46 MG/DL
VANCOMYCIN SERPL-MCNC: 23.5 UG/ML
WBC # BLD AUTO: 3.8 K/UL (ref 3.6–11)
WBC MORPH BLD: ABNORMAL

## 2024-09-25 PROCEDURE — 80053 COMPREHEN METABOLIC PANEL: CPT

## 2024-09-25 PROCEDURE — 84100 ASSAY OF PHOSPHORUS: CPT

## 2024-09-25 PROCEDURE — 36415 COLL VENOUS BLD VENIPUNCTURE: CPT

## 2024-09-25 PROCEDURE — 2580000003 HC RX 258: Performed by: STUDENT IN AN ORGANIZED HEALTH CARE EDUCATION/TRAINING PROGRAM

## 2024-09-25 PROCEDURE — 6360000002 HC RX W HCPCS: Performed by: INTERNAL MEDICINE

## 2024-09-25 PROCEDURE — 6370000000 HC RX 637 (ALT 250 FOR IP): Performed by: STUDENT IN AN ORGANIZED HEALTH CARE EDUCATION/TRAINING PROGRAM

## 2024-09-25 PROCEDURE — 6370000000 HC RX 637 (ALT 250 FOR IP): Performed by: INTERNAL MEDICINE

## 2024-09-25 PROCEDURE — 80202 ASSAY OF VANCOMYCIN: CPT

## 2024-09-25 PROCEDURE — 6360000002 HC RX W HCPCS: Performed by: STUDENT IN AN ORGANIZED HEALTH CARE EDUCATION/TRAINING PROGRAM

## 2024-09-25 PROCEDURE — 84478 ASSAY OF TRIGLYCERIDES: CPT

## 2024-09-25 PROCEDURE — 2580000003 HC RX 258: Performed by: INTERNAL MEDICINE

## 2024-09-25 PROCEDURE — 85025 COMPLETE CBC W/AUTO DIFF WBC: CPT

## 2024-09-25 RX ORDER — DOXYCYCLINE HYCLATE 100 MG
100 TABLET ORAL 2 TIMES DAILY
Qty: 20 TABLET | Refills: 0 | Status: SHIPPED | OUTPATIENT
Start: 2024-09-25 | End: 2024-10-05

## 2024-09-25 RX ADMIN — SODIUM CHLORIDE, PRESERVATIVE FREE 10 ML: 5 INJECTION INTRAVENOUS at 08:03

## 2024-09-25 RX ADMIN — PANTOPRAZOLE SODIUM 40 MG: 40 INJECTION, POWDER, FOR SOLUTION INTRAVENOUS at 07:53

## 2024-09-25 RX ADMIN — DRONABINOL 5 MG: 2.5 CAPSULE ORAL at 07:53

## 2024-09-25 RX ADMIN — LOPERAMIDE HYDROCHLORIDE 4 MG: 2 CAPSULE ORAL at 07:53

## 2024-09-25 RX ADMIN — MIDODRINE HYDROCHLORIDE 5 MG: 5 TABLET ORAL at 07:53

## 2024-09-25 RX ADMIN — SODIUM CHLORIDE 3000 MG: 900 INJECTION INTRAVENOUS at 03:59

## 2024-09-25 RX ADMIN — MORPHINE SULFATE 30 MG: 15 TABLET ORAL at 10:58

## 2024-09-25 RX ADMIN — ENOXAPARIN SODIUM 40 MG: 100 INJECTION SUBCUTANEOUS at 07:53

## 2024-09-25 RX ADMIN — ESCITALOPRAM OXALATE 10 MG: 10 TABLET ORAL at 07:53

## 2024-09-25 RX ADMIN — SODIUM CHLORIDE 3000 MG: 900 INJECTION INTRAVENOUS at 07:55

## 2024-09-25 ASSESSMENT — PAIN DESCRIPTION - LOCATION: LOCATION: ABDOMEN;BACK

## 2024-09-25 ASSESSMENT — PAIN SCALES - GENERAL: PAINLEVEL_OUTOF10: 5

## 2024-09-25 NOTE — PLAN OF CARE
Problem: Discharge Planning  Goal: Discharge to home or other facility with appropriate resources  Outcome: HH/HSPC Progressing     Problem: Pain  Goal: Verbalizes/displays adequate comfort level or baseline comfort level  Outcome: HH/HSPC Progressing     Problem: Chronic Conditions and Co-morbidities  Goal: Patient's chronic conditions and co-morbidity symptoms are monitored and maintained or improved  Outcome: HH/HSPC Progressing     Problem: Safety - Adult  Goal: Free from fall injury  Outcome: HH/HSPC Progressing     Problem: Nutrition Deficit:  Goal: Optimize nutritional status  Outcome: HH/HSPC Progressing

## 2024-09-25 NOTE — CARE COORDINATION
Care Management Initial Assessment  9/25/2024 11:01 AM  If patient is discharged prior to next notation, then this note serves as note for discharge by case management.    Reason for Admission:   Cellulitis of abdominal wall [L03.311]  Abdominal wall cellulitis [L03.311]         Patient Admission Status: Inpatient  Date Admitted to IN: 9/23/24  RUR: Readmission Risk Score: 17.7      Hospitalization in the last 30 days (Readmission):  No        Advance Care Planning:  Code Status: Full Code  Primary Healthcare Decision Maker:    Primary Decision Maker: Katherine Chambers - Other - [phone]   Advance Directive: Power of  for healthcare on file     __________________________________________________________________________  Assessment:      09/25/24 1031   Service Assessment   Patient Orientation Alert and Oriented   Cognition Alert   History Provided By Patient   Primary Caregiver Self   Support Systems Spouse/Significant Other   PCP Verified by CM Yes  (Dr. Raymond)   Last Visit to PCP Within last 3 months   Prior Functional Level Independent in ADLs/IADLs   Can patient return to prior living arrangement Yes   Ability to make needs known: Good   Family able to assist with home care needs: Yes   Financial Resources Medicaid   Community Resources None   Social/Functional History   Lives With Significant other   ADL Assistance Independent   Discharge Planning   Living Arrangements Spouse/Significant Other   Current Services Prior To Admission [facility]  (through [facility] for TPN and line care)   DME Ordered? No   Patient expects to be discharged to: House   Services At/After Discharge   Transition of Care Consult (CM Consult) N/A   Services At/After Discharge None   [place] Resource Information Provided? No   Mode of Transport at Discharge Other (see comment)  (boyfriend)   Condition of Participation: Discharge Planning   The Plan for Transition of Care is related to the following treatment goals:

## 2024-09-25 NOTE — DISCHARGE SUMMARY
Hospitalist Discharge Summary     Patient ID:  Cindy Ortega  [id number]  48 y.o.  1975    Admit date: 9/23/2024    Discharge date and time: 9/25/2024    Admission Diagnoses: Cellulitis of abdominal wall [L03.311]  Abdominal wall cellulitis [L03.311]    Discharge Diagnoses:    Principal Problem:    Cellulitis of abdominal wall  Active Problems:    Right renal mass    Crohn's disease (HCC)    Morbid obesity (HCC)    Pure hypercholesterolemia    Seizure disorder (HCC)    Jejunostomy present (HCC)    On total parenteral nutrition (TPN)    S/P total colectomy  Resolved Problems:    * No resolved hospital problems. *         Hospital Course:   Cellulitis with abscess of abdominal wall: POA. Acute. failed outpatient treatments and purulent. Hx + MRSA 9/3/24. Improving. BCx NG. Wcx GPC. Likely MRSA  - vanc IV (substantial improvement) > doxy PO  - Follow up with me at [facility] wound care clinic     Crohn's disease / S/P total colectomy / Jejunostomy present / On total parenteral nutrition (TPN) POA: chronic  - Held humira  - Continue Morphine PRN  - Has referral to [facility] for this already     Known right lower renal mass / new right upper renal mass: POA. incidentally noted on CT scan. Uro consulted and recommended, and placed referral to [facility] urology as she will need nephrectomy at [facility]  - outpt uro follow up with [facility]     Low K and phos: Mild and likely transient  - replete and monitor     Pure hypercholesterolemia POA: not taking any medications  - PCP follow up     Hx Seizure disorder POA: not taking any medications  - PCP follow up    Imaging  CT ABDOMEN PELVIS W IV CONTRAST Additional Contrast? None    Result Date: 9/23/2024  Marked cellulitis of the anterior abdomen. No evidence for gas gangrene or abscess Right upper renal pole mass for which three-phase CT is recommended electively to further characterize, to differentiate between a hyperdense cyst in early renal cell carcinoma Stable

## 2024-09-25 NOTE — DISCHARGE INSTRUCTIONS
HOSPITALIST DISCHARGE INSTRUCTIONS  NAME:  Cindy Ortega   :  1975   MRN:  [id number]     Date/Time:  2024 7:11 AM    ADMIT DATE: 2024     DISCHARGE DATE: 2024     DISCHARGE DIAGNOSIS:  Abdominal wall abscess    DISCHARGE INSTRUCTIONS:  Thank you for allowing us to participate in your care. Your discharging Hospitalist is Yani Bustamante MD. You were admitted for evaluation and treatment of the above. The cause of the abscess is likely a MRSA infection. Please take your meds as prescribed and follow up with your PCP as well as with me for wound care.      MEDICATIONS:    It is important that you take the medication exactly as they are prescribed.   Keep your medication in the bottles provided by the pharmacist and keep a list of the medication names, dosages, and times to be taken in your wallet.   Do not take other medications without consulting your doctor.             If you experience any of the following symptoms then please call your primary care physician or return to the emergency room if you cannot get hold of your doctor:  Fever, chills, nausea, vomiting, diarrhea, change in mentation, falling, bleeding, shortness of breath    Follow Up:  Please call the below provider to arrange hospital follow up appointment      Aaron Rios MD  [address] [place] [address]  [phone]    Schedule an appointment as soon as possible for a visit      Yani Bustamante MD - WOUND CARE  [facility] at [address]  [place] [address]  Tel: [phone]  Fax: [phone]  Follow up          Information obtained by :  I understand that if any problems occur once I am at home I am to contact my physician.    I understand and acknowledge receipt of the instructions indicated above.                                                                                                                                            Physician's or R.N.'s Signature                                                                  Date/Time                                                                                                                                              Patient or Representative Signature                                                          Date/Time

## 2024-09-26 ENCOUNTER — TELEPHONE (OUTPATIENT)
Facility: CLINIC | Age: 49
End: 2024-09-26

## 2024-09-26 ENCOUNTER — TELEPHONE (OUTPATIENT)
Facility: HOSPITAL | Age: 49
End: 2024-09-26

## 2024-09-27 ENCOUNTER — HOSPITAL ENCOUNTER (OUTPATIENT)
Facility: HOSPITAL | Age: 49
Discharge: HOME | End: 2024-09-27
Attending: STUDENT IN AN ORGANIZED HEALTH CARE EDUCATION/TRAINING PROGRAM
Payer: MEDICARE

## 2024-09-27 VITALS
HEART RATE: 70 BPM | TEMPERATURE: 97.9 F | DIASTOLIC BLOOD PRESSURE: 58 MMHG | SYSTOLIC BLOOD PRESSURE: 119 MMHG | RESPIRATION RATE: 14 BRPM

## 2024-09-27 DIAGNOSIS — S31.109A CHRONIC ABDOMINAL WOUND INFECTION, INITIAL ENCOUNTER: Primary | ICD-10-CM

## 2024-09-27 DIAGNOSIS — L08.9 CHRONIC ABDOMINAL WOUND INFECTION, INITIAL ENCOUNTER: Primary | ICD-10-CM

## 2024-09-27 LAB
BACTERIA SPEC CULT: ABNORMAL
GRAM STN SPEC: ABNORMAL
GRAM STN SPEC: ABNORMAL
SERVICE CMNT-IMP: ABNORMAL

## 2024-09-27 PROCEDURE — 99212 OFFICE O/P EST SF 10 MIN: CPT

## 2024-09-27 PROCEDURE — 11042 DBRDMT SUBQ TIS 1ST 20SQCM/<: CPT

## 2024-09-27 ASSESSMENT — PAIN DESCRIPTION - LOCATION: LOCATION: ABDOMEN;BACK

## 2024-09-27 ASSESSMENT — PAIN SCALES - GENERAL: PAINLEVEL_OUTOF10: 4

## 2024-09-29 DIAGNOSIS — I95.1 ORTHOSTATIC HYPOTENSION: ICD-10-CM

## 2024-09-30 RX ORDER — MIDODRINE HYDROCHLORIDE 5 MG/1
5 TABLET ORAL
Qty: 90 TABLET | Refills: 3 | Status: SHIPPED | OUTPATIENT
Start: 2024-09-30

## 2024-10-01 ENCOUNTER — TELEMEDICINE (OUTPATIENT)
Facility: CLINIC | Age: 49
End: 2024-10-01

## 2024-10-01 DIAGNOSIS — J45.20 MILD INTERMITTENT EXTRINSIC ASTHMA WITHOUT COMPLICATION: ICD-10-CM

## 2024-10-01 DIAGNOSIS — L03.311 CELLULITIS, ABDOMINAL WALL: ICD-10-CM

## 2024-10-01 DIAGNOSIS — N28.89 RENAL MASS: ICD-10-CM

## 2024-10-01 DIAGNOSIS — Z09 HOSPITAL DISCHARGE FOLLOW-UP: Primary | ICD-10-CM

## 2024-10-01 RX ORDER — MORPHINE SULFATE 15 MG/1
TABLET, FILM COATED, EXTENDED RELEASE ORAL
COMMUNITY
Start: 2024-09-27

## 2024-10-01 RX ORDER — ALBUTEROL SULFATE 90 UG/1
2 INHALANT RESPIRATORY (INHALATION) 4 TIMES DAILY PRN
Qty: 54 G | Refills: 1 | Status: SHIPPED | OUTPATIENT
Start: 2024-10-01

## 2024-10-01 RX ORDER — POTASSIUM CHLORIDE 750 MG/1
CAPSULE, EXTENDED RELEASE ORAL
COMMUNITY
Start: 2024-09-14

## 2024-10-01 RX ORDER — ZOLEDRONIC ACID 0.05 MG/ML
5 INJECTION, SOLUTION INTRAVENOUS ONCE
COMMUNITY
Start: 2024-10-01
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
## 2024-10-11 NOTE — PROGRESS NOTES
Physician Progress Note      PATIENT:               TENNILLE LIVINGSTON  CSN #:                  [id number]  :                       1975  ADMIT DATE:       2024 4:49 PM  DISCH DATE:        2024 11:30 AM  RESPONDING  PROVIDER #:        Yani Bustamante MD          QUERY TEXT:    Patient admitted with cellulitis.  Noted to have  mild  malnutrition on  eval    .   If possible, please document in progress notes and discharge summary   if you are evaluating and /or treating any of the following:    The medical record reflects the following:  Risk Factors: Crohn's  ; S/P total colectomy / Jejunostomy present / On total   parenteral nutrition (TPN)  Clinical Indicators:  Mild malnutrition (24 1137)  Context:  Chronic Illness  Findings of the 6 clinical characteristics of malnutrition:  Energy Intake:  No significant decrease in energy intake  Weight Loss:  Mild weight loss (specify amount and time period)  Body Fat Loss:  Mild body fat loss  Muscle Mass Loss:  No significant muscle mass loss  Fluid Accumulation:  No significant fluid accumulation   Strength:  Not Performed    Treatment: 1. Add Ensure hp - vanilla BID;  Continue TPN -    ASPEN Criteria:    https://aspenjournals.onlinelibrary.yang.com/doi/full/10.1177/972974100754895  5    Thank you,    Melva Moreira RN   CCDS  Options provided:  -- Protein calorie malnutrition mild  -- Other - I will add my own diagnosis  -- Disagree - Not applicable / Not valid  -- Disagree - Clinically unable to determine / Unknown  -- Refer to Clinical Documentation Reviewer    PROVIDER RESPONSE TEXT:    This patient has mild protein calorie malnutrition.    Query created by: Katharina Moreira on 2024 10:12 AM      Electronically signed by:  Yani Bustamante MD 10/11/2024 8:12 AM

## 2024-10-18 ENCOUNTER — HOSPITAL ENCOUNTER (OUTPATIENT)
Facility: HOSPITAL | Age: 49
Discharge: HOME | End: 2024-10-18
Attending: STUDENT IN AN ORGANIZED HEALTH CARE EDUCATION/TRAINING PROGRAM
Payer: MEDICARE

## 2024-10-18 VITALS
TEMPERATURE: 97.7 F | SYSTOLIC BLOOD PRESSURE: 100 MMHG | DIASTOLIC BLOOD PRESSURE: 64 MMHG | HEART RATE: 73 BPM | RESPIRATION RATE: 16 BRPM

## 2024-10-18 DIAGNOSIS — L98.492 ULCER OF ABDOMEN WALL WITH FAT LAYER EXPOSED (HCC): Primary | ICD-10-CM

## 2024-10-18 PROCEDURE — 11042 DBRDMT SUBQ TIS 1ST 20SQCM/<: CPT

## 2024-10-18 RX ORDER — MULTIVIT-MIN/IRON/FOLIC ACID/K 18-600-40
2000 CAPSULE ORAL DAILY
COMMUNITY

## 2024-10-18 ASSESSMENT — PAIN SCALES - GENERAL: PAINLEVEL_OUTOF10: 4

## 2024-10-18 ASSESSMENT — PAIN DESCRIPTION - LOCATION: LOCATION: ABDOMEN;BACK

## 2024-10-18 ASSESSMENT — PAIN DESCRIPTION - DESCRIPTORS: DESCRIPTORS: SORE

## 2024-10-18 NOTE — FLOWSHEET NOTE
10/18/24 0920   Anesthetic   Anesthetic 4% Lidocaine Liquid Topical   Wound 09/24/24 Abdomen Medial #1   Date First Assessed/Time First Assessed: 09/24/24 0100   Present on Original Admission: Yes  Primary Wound Type: Other (comment)  Location: Abdomen  Wound Location Orientation: Medial  Wound Description (Comments): #1   Wound Image    Dressing Status Old drainage noted   Wound Cleansed Cleansed with saline   Wound Length (cm) 0.5 cm   Wound Width (cm) 0.5 cm   Wound Depth (cm) 0.2 cm   Wound Surface Area (cm^2) 0.25 cm^2   Change in Wound Size % (l*w) -108.33   Wound Volume (cm^3) 0.05 cm^3   Wound Healing % -39   Wound Assessment [place]/red;Slough   Drainage Amount Moderate (25-50%)   Drainage Description Yellow;Serous;Other (Comment)  (TAN)   Odor None   Natalia-wound Assessment Induration;Blanchable erythema   Margins Flat/open edges   Wound Thickness Description not for Pressure Injury Full thickness     /64   Pulse 73   Temp 97.7 °F (36.5 °C) (Temporal)   Resp 16

## 2024-10-18 NOTE — PROGRESS NOTES
shower  [x] cleanse with baby shampoo lather leave 2-3 then rinse with water    Topical Treatments:  Do not apply lotions, creams, or ointments to wound bed unless directed.   [] Apply moisturizing lotion  to skin surrounding the wound prior to dressing change.  [] Other:     Dressings:                   Wound Location abdomen     Apply Primary Dressing:      [x] Cordelia Aquacel ag       Pack wound loosely with:                                                   [] Iodoform       [] Plain Packing       [] Mesalt        [x] Other:HFBR    Cover and Secure with:  [x] Gauze [] ABD [] exudry     [] Kaylin [] Kerlix [] Mepilex Border  [] Ace Wrap [x] Roll Tape   [] Other: Drawtex     Change dressing:   [] Daily      [x] Every Other Day   [] Three times per week  [] Once a week   [] Do Not Change Dressing     [] Other:           [] Elevate leg(s) above the level of the heart when sitting.   [] Avoid prolonged standing in one place.    Dietary:  [x] Diet as tolerated [] Diabetic Diet   [x] Increase Protein: examples (Meat, cheese, eggs, greek yogurt, fish, nuts)   [x] Sam Therapeutic Nutrition Powder  [] Other:  [] Dial a Dietician : Call [employer] at [phone] enter code ([email or web address]) when prompted. M-F 9am-5pm EST.     Return Appointment:  [] Nurse Visit at wound center in  days   [x] Return Appointment: With Dr. Bustamante in 1 Week     Electronically signed on 10/18/2024 at 10:10 AM     PLEASE NOTE: IF YOU ARE UNABLE TO OBTAIN WOUND SUPPLIES, CONTINUE TO USE THE SUPPLIES YOU HAVE AVAILABLE UNTIL YOU ARE ABLE TO REACH US. IT IS MOST IMPORTANT TO KEEP THE WOUND COVERED AT ALL TIMES.     Physician Signature:_______________________  Dr. Bustamante        Electronically signed by Yani Bustamante MD on 10/18/2024 at 11:53 AM

## 2024-10-18 NOTE — PATIENT INSTRUCTIONS
Discharge Instructions for  [facility]  [address] [place] [address]  Telephone: [phone]     FAX [phone]    Wound Care Center Information: Should you experience any significant changes in your wound(s) or have questions about your wound care, please contact the [facility] at MONDAY - FRIDAY 8:00 am - 4:30.  If you need help with your wound outside these hours and cannot wait until we are again available, contact your PCP or go to the hospital emergency room.     NAME:  Cindy Ortega  YOB: 1975  DATE:  10/18/2024    : Munir LARRY    [] Wound and dressing supply provider:   [x] Home Healthcare:[facility]    Wound Cleansing:   Do not scrub or use excessive force.  Cleanse wound prior to applying a clean dressing with:  [] Normal Saline   [] Keep Wound Dry in Shower - may purchase a cast cover at local pharmacy     [] Cleanse wound with Mild Soap & Water    [] May Shower: remove dressing 1st, wash with mild soap and water, pat dry, and redress wound right after with a new dressing  [] Do not shower  [x] cleanse with baby shampoo lather leave 2-3 then rinse with water    Topical Treatments:  Do not apply lotions, creams, or ointments to wound bed unless directed.   [] Apply moisturizing lotion  to skin surrounding the wound prior to dressing change.  [] Other:     Dressings:                   Wound Location abdomen     Apply Primary Dressing:      [x] Cordelia Aquacel ag       Pack wound loosely with:                                                   [] Iodoform       [] Plain Packing       [] Mesalt        [x] Other:HFBR    Cover and Secure with:  [x] Gauze [] ABD [] exudry     [] Kaylin [] Kerlix [] Mepilex Border  [] Ace Wrap [x] Roll Tape   [] Other: Drawtex     Change dressing:   [] Daily      [x] Every Other Day   [] Three times per week  [] Once a week   [] Do Not Change Dressing     [] Other:           []

## 2024-10-18 NOTE — FLOWSHEET NOTE
10/18/24 1023   Wound 09/24/24 Abdomen Medial #1   Date First Assessed/Time First Assessed: 09/24/24 0100   Present on Original Admission: Yes  Primary Wound Type: Other (comment)  Location: Abdomen  Wound Location Orientation: Medial  Wound Description (Comments): #1   Dressing/Treatment Collagen with Ag;Alginate with Ag;Gauze dressing/dressing sponge;Tape/Soft cloth adhesive tape     Discharge Condition: Stable    Pain: 4    Ambulatory Status: None    Discharge Destination: home    Transportation:car    Accompanied by: FAMILY    Discharge instructions reviewed with FAMILY and patient and copy or written instructions have been provided. All questions/concerns have been addressed at this time.

## 2024-10-25 ENCOUNTER — HOSPITAL ENCOUNTER (OUTPATIENT)
Facility: HOSPITAL | Age: 49
Discharge: HOME | End: 2024-10-25
Attending: STUDENT IN AN ORGANIZED HEALTH CARE EDUCATION/TRAINING PROGRAM
Payer: MEDICARE

## 2024-10-25 VITALS
RESPIRATION RATE: 16 BRPM | SYSTOLIC BLOOD PRESSURE: 122 MMHG | DIASTOLIC BLOOD PRESSURE: 58 MMHG | TEMPERATURE: 98.4 F | HEART RATE: 97 BPM

## 2024-10-25 DIAGNOSIS — L98.492 ULCER OF ABDOMEN WALL WITH FAT LAYER EXPOSED (HCC): Primary | ICD-10-CM

## 2024-10-25 PROCEDURE — 11042 DBRDMT SUBQ TIS 1ST 20SQCM/<: CPT

## 2024-10-25 ASSESSMENT — PAIN DESCRIPTION - PAIN TYPE: TYPE: CHRONIC PAIN

## 2024-10-25 ASSESSMENT — PAIN SCALES - GENERAL: PAINLEVEL_OUTOF10: 4

## 2024-10-25 ASSESSMENT — PAIN DESCRIPTION - LOCATION: LOCATION: GENERALIZED

## 2024-10-25 NOTE — PATIENT INSTRUCTIONS
Discharge Instructions for  [facility]  [address] [place] [address]  Telephone: [phone]     FAX [phone]    Wound Care Center Information: Should you experience any significant changes in your wound(s) or have questions about your wound care, please contact the [facility] at MONDAY - FRIDAY 8:00 am - 4:30.  If you need help with your wound outside these hours and cannot wait until we are again available, contact your PCP or go to the hospital emergency room.     NAME:  Cindy Ortega  YOB: 1975  DATE:  10/25/2024    : Munir LARRY    [] Wound and dressing supply provider:   [x] Home Healthcare:[facility]    Wound Cleansing:   Do not scrub or use excessive force.  Cleanse wound prior to applying a clean dressing with:  [] Normal Saline   [] Keep Wound Dry in Shower - may purchase a cast cover at local pharmacy     [] Cleanse wound with Mild Soap & Water    [] May Shower: remove dressing 1st, wash with mild soap and water, pat dry, and redress wound right after with a new dressing  [] Do not shower  [x] cleanse with baby shampoo lather leave 2-3 then rinse with water    Topical Treatments:  Do not apply lotions, creams, or ointments to wound bed unless directed.   [] Apply moisturizing lotion  to skin surrounding the wound prior to dressing change.  [] Other:     Dressings:                   Wound Location abdomen     Apply Primary Dressing:      [x] Cordelia Aquacel ag       Pack wound loosely with:                                                   [] Iodoform       [] Plain Packing       [] Mesalt        [x] Other:HFBR    Cover and Secure with:  [x] Gauze [] ABD [] exudry     [] Kaylin [] Kerlix [] Mepilex Border  [] Ace Wrap [x] Roll Tape   [] Other: Drawtex     Change dressing:   [] Daily      [x] Every Other Day   [] Three times per week  [] Once a week   [] Do Not Change Dressing     [] Other:           []

## 2024-10-25 NOTE — PROGRESS NOTES
needed for diarrhea (Patient taking differently: Take 1 capsule by mouth 4 times daily as needed) 90 capsule 1    EPINEPHrine (EPIPEN) 0.3 MG/0.3ML SOAJ injection inject 0.3 milliliters ( 0.3 milligrams ) intramuscularly ONCE if...  (REFER TO PRESCRIPTION NOTES).      heparin flush 100 UNIT/ML injection Infuse intravenously daily      naloxone (NARCAN) 4 MG/0.1ML LIQD nasal spray Narcan 4 mg/actuation nasal spray (spray 1 actuation via nasal for opiod overdose)      promethazine (PHENERGAN) 25 MG tablet Take by mouth every 6 hours as needed       No current facility-administered medications on file prior to encounter.       REVIEW OF SYSTEMS  A comprehensive review of systems was negative except noted in HPI/wound narrative and/or updates above.  Written patient dismissal instructions given to patient and signed by patient or POA.  Patient voiced understanding that the importance of adherence to instructions is paramount to wound healing improvement or success.   Patient Instructions   Discharge Instructions for  [facility]  [address] [place] [address]  Telephone: [phone]     FAX [phone]    Wound Care Center Information: Should you experience any significant changes in your wound(s) or have questions about your wound care, please contact the [facility] at MONDAY - FRIDAY 8:00 am - 4:30.  If you need help with your wound outside these hours and cannot wait until we are again available, contact your PCP or go to the hospital emergency room.     NAME:  Cindy Ortega  YOB: 1975  DATE:  10/25/2024    : Munir LARRY    [] Wound and dressing supply provider:   [x] Home Healthcare:[facility]    Wound Cleansing:   Do not scrub or use excessive force.  Cleanse wound prior to applying a clean dressing with:  [] Normal Saline   [] Keep Wound Dry in Shower - may purchase a cast cover at local pharmacy     [] Cleanse wound

## 2024-10-25 NOTE — FLOWSHEET NOTE
10/25/24 1130   Anesthetic   Anesthetic 4% Lidocaine Liquid Topical   Wound 09/24/24 Abdomen Medial #1   Date First Assessed/Time First Assessed: 09/24/24 0100   Present on Original Admission: Yes  Primary Wound Type: Other (comment)  Location: Abdomen  Wound Location Orientation: Medial  Wound Description (Comments): #1   Wound Image    Wound Cleansed Cleansed with saline   Wound Length (cm) 0.2 cm   Wound Width (cm) 0.2 cm   Wound Depth (cm) 0.2 cm   Wound Surface Area (cm^2) 0.04 cm^2   Change in Wound Size % (l*w) 66.67   Wound Volume (cm^3) 0.008 cm^3   Wound Healing % 78   Wound Assessment [place]/red;[place]   Drainage Amount Moderate (25-50%)   Drainage Description Serosanguinous   Odor None   Natalia-wound Assessment Blanchable erythema;Other (Comment)  (hypertrophic)   Margins Flat/open edges   Wound Thickness Description not for Pressure Injury Full thickness   Pain Assessment   Pain Assessment 0-10   Pain Level 4   Pain Location Generalized   Pain Type Chronic pain     BP (!) 122/58   Pulse 97   Temp 98.4 °F (36.9 °C) (Temporal)   Resp 16

## 2024-10-28 RX ORDER — COVID-19 ANTIGEN TEST
KIT MISCELLANEOUS
Qty: 2 KIT | Refills: 0 | Status: SHIPPED | OUTPATIENT
Start: 2024-10-28

## 2024-11-08 ENCOUNTER — HOSPITAL ENCOUNTER (OUTPATIENT)
Facility: HOSPITAL | Age: 49
Discharge: HOME | End: 2024-11-08
Attending: STUDENT IN AN ORGANIZED HEALTH CARE EDUCATION/TRAINING PROGRAM
Payer: MEDICARE

## 2024-11-08 VITALS
DIASTOLIC BLOOD PRESSURE: 74 MMHG | TEMPERATURE: 97.9 F | RESPIRATION RATE: 16 BRPM | SYSTOLIC BLOOD PRESSURE: 112 MMHG | HEART RATE: 86 BPM

## 2024-11-08 DIAGNOSIS — S31.109A CHRONIC ABDOMINAL WOUND INFECTION, INITIAL ENCOUNTER: ICD-10-CM

## 2024-11-08 DIAGNOSIS — L08.9 CHRONIC ABDOMINAL WOUND INFECTION, INITIAL ENCOUNTER: ICD-10-CM

## 2024-11-08 DIAGNOSIS — L98.492 ULCER OF ABDOMEN WALL WITH FAT LAYER EXPOSED (HCC): Primary | ICD-10-CM

## 2024-11-08 DIAGNOSIS — L03.311 CELLULITIS OF ABDOMINAL WALL: ICD-10-CM

## 2024-11-08 PROCEDURE — 11042 DBRDMT SUBQ TIS 1ST 20SQCM/<: CPT

## 2024-11-08 RX ORDER — GINSENG 100 MG
CAPSULE ORAL ONCE
Status: CANCELLED | OUTPATIENT
Start: 2024-11-08 | End: 2024-11-08

## 2024-11-08 RX ORDER — LIDOCAINE HYDROCHLORIDE 20 MG/ML
JELLY TOPICAL ONCE
Status: CANCELLED | OUTPATIENT
Start: 2024-11-08 | End: 2024-11-08

## 2024-11-08 RX ORDER — GINSENG 100 MG
CAPSULE ORAL ONCE
OUTPATIENT
Start: 2024-11-08 | End: 2024-11-08

## 2024-11-08 RX ORDER — LIDOCAINE 50 MG/G
OINTMENT TOPICAL ONCE
Status: CANCELLED | OUTPATIENT
Start: 2024-11-08 | End: 2024-11-08

## 2024-11-08 RX ORDER — TRIAMCINOLONE ACETONIDE 1 MG/G
OINTMENT TOPICAL ONCE
OUTPATIENT
Start: 2024-11-08 | End: 2024-11-08

## 2024-11-08 RX ORDER — BETAMETHASONE DIPROPIONATE 0.5 MG/G
CREAM TOPICAL ONCE
OUTPATIENT
Start: 2024-11-08 | End: 2024-11-08

## 2024-11-08 RX ORDER — NEOMYCIN/BACITRACIN/POLYMYXINB 3.5-400-5K
OINTMENT (GRAM) TOPICAL ONCE
OUTPATIENT
Start: 2024-11-08 | End: 2024-11-08

## 2024-11-08 RX ORDER — SODIUM CHLOR/HYPOCHLOROUS ACID 0.033 %
SOLUTION, IRRIGATION IRRIGATION ONCE
OUTPATIENT
Start: 2024-11-08 | End: 2024-11-08

## 2024-11-08 RX ORDER — GENTAMICIN SULFATE 1 MG/G
OINTMENT TOPICAL ONCE
OUTPATIENT
Start: 2024-11-08 | End: 2024-11-08

## 2024-11-08 RX ORDER — LIDOCAINE HYDROCHLORIDE 40 MG/ML
SOLUTION TOPICAL ONCE
Status: CANCELLED | OUTPATIENT
Start: 2024-11-08 | End: 2024-11-08

## 2024-11-08 RX ORDER — GENTAMICIN SULFATE 1 MG/G
OINTMENT TOPICAL ONCE
Status: CANCELLED | OUTPATIENT
Start: 2024-11-08 | End: 2024-11-08

## 2024-11-08 RX ORDER — MUPIROCIN 20 MG/G
OINTMENT TOPICAL ONCE
Status: CANCELLED | OUTPATIENT
Start: 2024-11-08 | End: 2024-11-08

## 2024-11-08 RX ORDER — BACITRACIN ZINC AND POLYMYXIN B SULFATE 500; 1000 [USP'U]/G; [USP'U]/G
OINTMENT TOPICAL ONCE
OUTPATIENT
Start: 2024-11-08 | End: 2024-11-08

## 2024-11-08 RX ORDER — LIDOCAINE 50 MG/G
OINTMENT TOPICAL ONCE
OUTPATIENT
Start: 2024-11-08 | End: 2024-11-08

## 2024-11-08 RX ORDER — MUPIROCIN 20 MG/G
OINTMENT TOPICAL ONCE
OUTPATIENT
Start: 2024-11-08 | End: 2024-11-08

## 2024-11-08 RX ORDER — BACITRACIN ZINC AND POLYMYXIN B SULFATE 500; 1000 [USP'U]/G; [USP'U]/G
OINTMENT TOPICAL ONCE
Status: CANCELLED | OUTPATIENT
Start: 2024-11-08 | End: 2024-11-08

## 2024-11-08 RX ORDER — LIDOCAINE HYDROCHLORIDE 40 MG/ML
SOLUTION TOPICAL ONCE
OUTPATIENT
Start: 2024-11-08 | End: 2024-11-08

## 2024-11-08 RX ORDER — NEOMYCIN/BACITRACIN/POLYMYXINB 3.5-400-5K
OINTMENT (GRAM) TOPICAL ONCE
Status: CANCELLED | OUTPATIENT
Start: 2024-11-08 | End: 2024-11-08

## 2024-11-08 RX ORDER — TRIAMCINOLONE ACETONIDE 1 MG/G
OINTMENT TOPICAL ONCE
Status: CANCELLED | OUTPATIENT
Start: 2024-11-08 | End: 2024-11-08

## 2024-11-08 RX ORDER — SODIUM CHLOR/HYPOCHLOROUS ACID 0.033 %
SOLUTION, IRRIGATION IRRIGATION ONCE
Status: CANCELLED | OUTPATIENT
Start: 2024-11-08 | End: 2024-11-08

## 2024-11-08 RX ORDER — LIDOCAINE 40 MG/G
CREAM TOPICAL ONCE
OUTPATIENT
Start: 2024-11-08 | End: 2024-11-08

## 2024-11-08 RX ORDER — LIDOCAINE HYDROCHLORIDE 20 MG/ML
JELLY TOPICAL ONCE
OUTPATIENT
Start: 2024-11-08 | End: 2024-11-08

## 2024-11-08 RX ORDER — BETAMETHASONE DIPROPIONATE 0.5 MG/G
CREAM TOPICAL ONCE
Status: CANCELLED | OUTPATIENT
Start: 2024-11-08 | End: 2024-11-08

## 2024-11-08 RX ORDER — SILVER SULFADIAZINE 10 MG/G
CREAM TOPICAL ONCE
OUTPATIENT
Start: 2024-11-08 | End: 2024-11-08

## 2024-11-08 RX ORDER — CLOBETASOL PROPIONATE 0.5 MG/G
OINTMENT TOPICAL ONCE
OUTPATIENT
Start: 2024-11-08 | End: 2024-11-08

## 2024-11-08 RX ORDER — LIDOCAINE 40 MG/G
CREAM TOPICAL ONCE
Status: CANCELLED | OUTPATIENT
Start: 2024-11-08 | End: 2024-11-08

## 2024-11-08 RX ORDER — SILVER SULFADIAZINE 10 MG/G
CREAM TOPICAL ONCE
Status: CANCELLED | OUTPATIENT
Start: 2024-11-08 | End: 2024-11-08

## 2024-11-08 RX ORDER — CLOBETASOL PROPIONATE 0.5 MG/G
OINTMENT TOPICAL ONCE
Status: CANCELLED | OUTPATIENT
Start: 2024-11-08 | End: 2024-11-08

## 2024-11-08 NOTE — PATIENT INSTRUCTIONS
Discharge Instructions for  [facility]  [address] [place] [address]  Telephone: [phone]     FAX [phone]    Wound Care Center Information: Should you experience any significant changes in your wound(s) or have questions about your wound care, please contact the [facility] at MONDAY - FRIDAY 8:00 am - 4:30.  If you need help with your wound outside these hours and cannot wait until we are again available, contact your PCP or go to the hospital emergency room.     NAME:  Cindy Ortega  YOB: 1975  DATE:  11/8/2024    : Munir LARRY    [] Wound and dressing supply provider:   [x] Home Healthcare:[facility]    Wound Cleansing:   Do not scrub or use excessive force.  Cleanse wound prior to applying a clean dressing with:  [] Normal Saline   [] Keep Wound Dry in Shower - may purchase a cast cover at local pharmacy     [] Cleanse wound with Mild Soap & Water    [] May Shower: remove dressing 1st, wash with mild soap and water, pat dry, and redress wound right after with a new dressing  [] Do not shower  [x] cleanse with baby shampoo lather leave 2-3 then rinse with water    Topical Treatments:  Do not apply lotions, creams, or ointments to wound bed unless directed.   [] Apply moisturizing lotion  to skin surrounding the wound prior to dressing change.  [] Other:     Dressings:                   Wound Location abdomen     Apply Primary Dressing:      [x] Cordelia Aquacel ag       Pack wound loosely with:                                                   [] Iodoform       [] Plain Packing       [] Mesalt        [] Other:HFBR    Cover and Secure with:  [x] Gauze [] ABD [] exudry     [] Kaylin [] Kerlix [] Mepilex Border  [] Ace Wrap [x] Roll Tape   [] Other: Drawtex     Change dressing:   [] Daily      [x] Every Other Day   [] Three times per week  [] Once a week   [] Do Not Change Dressing     [] Other:           []

## 2024-11-08 NOTE — FLOWSHEET NOTE
11/08/24 1104   Wound 09/24/24 Abdomen Medial #1   Date First Assessed/Time First Assessed: 09/24/24 0100   Present on Original Admission: Yes  Primary Wound Type: Other (comment)  Location: Abdomen  Wound Location Orientation: Medial  Wound Description (Comments): #1   Dressing/Treatment Collagen with Ag;Alginate with Ag;Gauze dressing/dressing sponge;Tape/Soft cloth adhesive tape     Discharge Condition: Stable    Pain: 1    Ambulatory Status: None    Discharge Destination: home    Transportation:car    Accompanied by: SELF    Discharge instructions reviewed with SELF and copy or written instructions have been provided. All questions/concerns have been addressed at this time.

## 2024-11-08 NOTE — PROGRESS NOTES
[facility]     Note Type: History and Physical    Referring Provider: Yani Bustamante MD  Reason for Referral: abdominal wall cellulitis and abscess recurrent and chronic    Cindy Ortega  MEDICAL RECORD NUMBER:  [id number]  AGE: 48 y.o.   GENDER: female  : 1975  EPISODE DATE:  2024    Chief complaint and reason for visit:     Chief Complaint   Patient presents with    Wound Check     ABDOMEN        HPI/Wound Narrative:      Cindy Ortega is a 48 y.o. female who presents today for an evaluation of a wound/ulcer. Wound duration: since (date) intermittently since  (most recently since 9/3/27) .    H and P:  She has a h/o Crohn's disease / S/P total colectomy / Jejunostomy present / On total parenteral nutrition (TPN)  She was admitted on my service at [facility] and received 3 days of IV vanc. BCx NG. Wcx grew MRSA. Sensitivities resulted since discharge. Only PO options: linezolid (interacts with other meds), bactrim (sulfa allergy) and doxy (intermediate). Patient was discharged on doxy after significant clinical improvement.    Follow up 10/18/24: Some drainage (nonpurulent. No odor). No pain. Some chills but no recorded fevers    Follow up 10/25/24: Some drainage, though much less (nonpurulent. No odor). No pain. Some chills but no recorded fevers    Follow up 24: Some drainage, though much less (nonpurulent. No odor). No pain. Some chills but no recorded fevers    Medical Decision Making / Treatment Plan:     Historian(s): patient .     Non pressure ulcer with fat layer exposed / chronic abd wound with infection: POA. Chronic (over 1 month). Infection now resolved. S/p doxy. Plastic surgery planning radical skin procedure with assistance from colorectal surgery. I would ask that they address the fistula as well. I suspect this wound wont heal without this.  - barry with aquacel  - RTC 2 weeks  - Sam  - sharp debridement as below    Comorbid conditions

## 2024-11-08 NOTE — FLOWSHEET NOTE
11/08/24 1015   Anesthetic   Anesthetic 4% Lidocaine Liquid Topical   Wound 09/24/24 Abdomen Medial #1   Date First Assessed/Time First Assessed: 09/24/24 0100   Present on Original Admission: Yes  Primary Wound Type: Other (comment)  Location: Abdomen  Wound Location Orientation: Medial  Wound Description (Comments): #1   Wound Image    Wound Cleansed Cleansed with saline   Wound Length (cm) 0.3 cm   Wound Width (cm) 0.3 cm   Wound Depth (cm) 0.2 cm   Wound Surface Area (cm^2) 0.09 cm^2   Change in Wound Size % (l*w) 25   Wound Volume (cm^3) 0.018 cm^3   Wound Healing % 50   Wound Assessment Pink/red   Drainage Amount Moderate (25-50%)   Drainage Description Yellow   Odor None   Natalia-wound Assessment Intact   Margins Epibole (rolled edges)   Wound Thickness Description not for Pressure Injury Full thickness   Pain Assessment   Pain Assessment None - Denies Pain     /74   Pulse 86   Temp 97.9 °F (36.6 °C) (Temporal)   Resp 16

## 2024-12-02 NOTE — PROGRESS NOTES
Bedside shift change report given to [address] [place] [address] (oncoming nurse) by Alonzo Jacob RN (offgoing nurse). Report included the following information SBAR, Kardex, Intake/Output, MAR and Recent Results. Please provide us a copy of your Living Will and/or the Durable Power of  for Healthcare for your file.     Considering your health conditions, I recommend that you get the RSV vaccine at your local pharmacy.    You are eligible for the COVID booster.  Without a recent (within 90 days) challenge (booster or infection) your immune system will be three days behind in protecting you from the infection.  You will infect approximately five people by that time.    Follow up fasting (no alcohol for 48 hours and just water for 14 hours) in six months for your next routine appointment.  In general, take any medications on schedule (except for types of Insulin).

## 2024-12-09 RX ORDER — FLUTICASONE PROPIONATE 50 MCG
SPRAY, SUSPENSION (ML) NASAL
Qty: 16 G | Refills: 5 | Status: SHIPPED | OUTPATIENT
Start: 2024-12-09

## 2024-12-20 ENCOUNTER — HOSPITAL ENCOUNTER (OUTPATIENT)
Facility: HOSPITAL | Age: 49
Discharge: HOME | End: 2024-12-20
Attending: STUDENT IN AN ORGANIZED HEALTH CARE EDUCATION/TRAINING PROGRAM
Payer: MEDICARE

## 2024-12-20 VITALS
RESPIRATION RATE: 16 BRPM | HEART RATE: 89 BPM | TEMPERATURE: 98.2 F | SYSTOLIC BLOOD PRESSURE: 130 MMHG | DIASTOLIC BLOOD PRESSURE: 60 MMHG

## 2024-12-20 DIAGNOSIS — L98.492 ULCER OF ABDOMEN WALL WITH FAT LAYER EXPOSED (HCC): Primary | ICD-10-CM

## 2024-12-20 PROCEDURE — 0JB80ZZ EXCISION OF ABDOMEN SUBCUTANEOUS TISSUE AND FASCIA, OPEN APPROACH: ICD-10-PCS | Performed by: INTERNAL MEDICINE

## 2024-12-20 PROCEDURE — 11042 DBRDMT SUBQ TIS 1ST 20SQCM/<: CPT

## 2024-12-20 RX ORDER — LIDOCAINE 50 MG/G
OINTMENT TOPICAL ONCE
OUTPATIENT
Start: 2024-12-20 | End: 2024-12-20

## 2024-12-20 RX ORDER — SODIUM CHLOR/HYPOCHLOROUS ACID 0.033 %
SOLUTION, IRRIGATION IRRIGATION ONCE
OUTPATIENT
Start: 2024-12-20 | End: 2024-12-20

## 2024-12-20 RX ORDER — LIDOCAINE HYDROCHLORIDE 20 MG/ML
JELLY TOPICAL ONCE
OUTPATIENT
Start: 2024-12-20 | End: 2024-12-20

## 2024-12-20 RX ORDER — SILVER SULFADIAZINE 10 MG/G
CREAM TOPICAL ONCE
OUTPATIENT
Start: 2024-12-20 | End: 2024-12-20

## 2024-12-20 RX ORDER — BETAMETHASONE DIPROPIONATE 0.5 MG/G
CREAM TOPICAL ONCE
OUTPATIENT
Start: 2024-12-20 | End: 2024-12-20

## 2024-12-20 RX ORDER — NEOMYCIN/BACITRACIN/POLYMYXINB 3.5-400-5K
OINTMENT (GRAM) TOPICAL ONCE
OUTPATIENT
Start: 2024-12-20 | End: 2024-12-20

## 2024-12-20 RX ORDER — LIDOCAINE 40 MG/G
CREAM TOPICAL ONCE
OUTPATIENT
Start: 2024-12-20 | End: 2024-12-20

## 2024-12-20 RX ORDER — MUPIROCIN 20 MG/G
OINTMENT TOPICAL ONCE
OUTPATIENT
Start: 2024-12-20 | End: 2024-12-20

## 2024-12-20 RX ORDER — CLOBETASOL PROPIONATE 0.5 MG/G
OINTMENT TOPICAL ONCE
OUTPATIENT
Start: 2024-12-20 | End: 2024-12-20

## 2024-12-20 RX ORDER — LIDOCAINE HYDROCHLORIDE 40 MG/ML
SOLUTION TOPICAL ONCE
OUTPATIENT
Start: 2024-12-20 | End: 2024-12-20

## 2024-12-20 RX ORDER — TRIAMCINOLONE ACETONIDE 1 MG/G
OINTMENT TOPICAL ONCE
OUTPATIENT
Start: 2024-12-20 | End: 2024-12-20

## 2024-12-20 RX ORDER — BACITRACIN ZINC AND POLYMYXIN B SULFATE 500; 1000 [USP'U]/G; [USP'U]/G
OINTMENT TOPICAL ONCE
OUTPATIENT
Start: 2024-12-20 | End: 2024-12-20

## 2024-12-20 RX ORDER — GENTAMICIN SULFATE 1 MG/G
OINTMENT TOPICAL ONCE
OUTPATIENT
Start: 2024-12-20 | End: 2024-12-20

## 2024-12-20 RX ORDER — GINSENG 100 MG
CAPSULE ORAL ONCE
OUTPATIENT
Start: 2024-12-20 | End: 2024-12-20

## 2024-12-20 ASSESSMENT — PAIN DESCRIPTION - LOCATION: LOCATION: GENERALIZED

## 2024-12-20 ASSESSMENT — PAIN SCALES - GENERAL: PAINLEVEL_OUTOF10: 6

## 2024-12-20 NOTE — FLOWSHEET NOTE
12/20/24 1059   Anesthetic   Anesthetic 4% Lidocaine Liquid Topical   Wound 09/24/24 Abdomen Medial #1   Date First Assessed/Time First Assessed: 09/24/24 0100   Present on Original Admission: Yes  Primary Wound Type: Other (comment)  Location: Abdomen  Wound Location Orientation: Medial  Wound Description (Comments): #1   Wound Image    Wound Cleansed Cleansed with saline   Wound Length (cm) 0.3 cm   Wound Width (cm) 0.3 cm   Wound Depth (cm) 1.7 cm   Wound Surface Area (cm^2) 0.09 cm^2   Change in Wound Size % (l*w) 25   Wound Volume (cm^3) 0.153 cm^3   Wound Healing % -325   Wound Assessment Pink/red   Drainage Amount Moderate (25-50%)   Drainage Description [place];Serosanguinous   Odor None   Natalia-wound Assessment Intact   Margins Flat/open edges;Epibole (rolled edges)   Wound Thickness Description not for Pressure Injury Full thickness     /60   Pulse 89   Temp 98.2 °F (36.8 °C) (Temporal)   Resp 16

## 2024-12-20 NOTE — PATIENT INSTRUCTIONS
Elevate leg(s) above the level of the heart when sitting.   [] Avoid prolonged standing in one place.    Dietary:  [x] Diet as tolerated [] Diabetic Diet   [x] Increase Protein: examples (Meat, cheese, eggs, greek yogurt, fish, nuts)   [x] Sam Therapeutic Nutrition Powder  [] Other:  [] Dial a Dietician : Call [email or web address] at [phone] enter code (249) when prompted. M-F 9am-5pm EST.     Return Appointment:  [] Nurse Visit at wound center in  days   [x] Return Appointment: With Dr. Bustamante in 4 Week     Electronically signed on 12/20/2024 at 11:06 AM     PLEASE NOTE: IF YOU ARE UNABLE TO OBTAIN WOUND SUPPLIES, CONTINUE TO USE THE SUPPLIES YOU HAVE AVAILABLE UNTIL YOU ARE ABLE TO REACH US. IT IS MOST IMPORTANT TO KEEP THE WOUND COVERED AT ALL TIMES.     Physician Signature:_______________________  Dr. Bustamante

## 2024-12-20 NOTE — FLOWSHEET NOTE
12/20/24 1117   Wound 09/24/24 Abdomen Medial #1   Date First Assessed/Time First Assessed: 09/24/24 0100   Present on Original Admission: Yes  Primary Wound Type: Other (comment)  Location: Abdomen  Wound Location Orientation: Medial  Wound Description (Comments): #1   Dressing/Treatment Collagen with Ag;Alginate with Ag;Gauze dressing/dressing sponge;Tape/Soft cloth adhesive tape     Discharge Condition: Stable    Pain: 6 - back chronic    Ambulatory Status:Walking    Discharge Destination: Home    Transportation:Car    Accompanied by: Self    Discharge instructions reviewed with Self and copy or written instructions have been provided. All questions/concerns have been addressed at this time.

## 2024-12-20 NOTE — PROGRESS NOTES
(10.0 ttl pk-yrs)     Types: Cigarettes     Start date: 2007     Quit date: 2017     Years since quittin.8    Smokeless tobacco: Former   Substance Use Topics    Alcohol use: No    Drug use: No       ALLERGIES  Allergies   Allergen Reactions    Macadamia Nut Oil Anaphylaxis    Meperidine Rash     Other reaction(s): Unknown (comments)  Other reaction(s): SEVERE NAUSEA  Reaction Type: Allergy    Sulfa Antibiotics Anaphylaxis    Nisoldipine     Peanut-Containing Drug Products     Pecan Nut (Diagnostic)     Sumatriptan Nausea Only    Carisoprodol Nausea Only, Other (See Comments) and Rash     Other reaction(s): RASH, ITCHING  Reaction Type: Allergy; Reaction(s): hives and itching    Ketorolac Tromethamine Nausea And Vomiting       MEDICATIONS  Current Outpatient Medications on File Prior to Encounter   Medication Sig Dispense Refill    zoledronic acid (RECLAST) 5 MG/100ML SOLN Infuse 100 mLs intravenously once      fluticasone (FLONASE) 50 MCG/ACT nasal spray instill 2 sprays INTRANASALLY daily at bedtime for 7 days 16 g 5    COVID-19 At Home Antigen Test KIT Use to test for COVID if you develop symptoms 2 kit 0    vitamin D 50 MCG (2000 UT) CAPS capsule Take 1 capsule by mouth daily      morphine (MS CONTIN) 15 MG extended release tablet take 1 tablet by mouth three times a day DO NOT CRUSH, CHEW, AND/OR DIVIDE      potassium chloride (MICRO-K) 10 MEQ extended release capsule take 2 capsules by mouth THE DAYS YOU ARE NOT TAKING TPN      albuterol sulfate HFA (VENTOLIN HFA) 108 (90 Base) MCG/ACT inhaler Inhale 2 puffs into the lungs 4 times daily as needed for Wheezing 54 g 1    midodrine (PROAMATINE) 5 MG tablet take 1 tablet by mouth three times a day with meals 90 tablet 3    escitalopram (LEXAPRO) 10 MG tablet Take 1 tablet by mouth daily      ondansetron (ZOFRAN-ODT) 4 MG disintegrating tablet Take 1 tablet by mouth 3 times daily as needed for Nausea or Vomiting 21 tablet 0    morphine (MSIR) 30 MG

## 2024-12-23 ENCOUNTER — HOSPITAL ENCOUNTER (INPATIENT)
Facility: HOSPITAL | Age: 49
LOS: 2 days | Discharge: HOME | DRG: 603 | End: 2024-12-25
Attending: STUDENT IN AN ORGANIZED HEALTH CARE EDUCATION/TRAINING PROGRAM | Admitting: STUDENT IN AN ORGANIZED HEALTH CARE EDUCATION/TRAINING PROGRAM
Payer: MEDICARE

## 2024-12-23 ENCOUNTER — APPOINTMENT (OUTPATIENT)
Facility: HOSPITAL | Age: 49
DRG: 603 | End: 2024-12-23
Payer: MEDICARE

## 2024-12-23 DIAGNOSIS — R18.8 ABDOMINAL WALL FLUID COLLECTIONS: Primary | ICD-10-CM

## 2024-12-23 DIAGNOSIS — L03.311 CELLULITIS OF ABDOMINAL WALL: ICD-10-CM

## 2024-12-23 PROBLEM — A41.9 SEPSIS (HCC): Status: ACTIVE | Noted: 2024-12-23

## 2024-12-23 LAB
ALBUMIN SERPL-MCNC: 4.3 G/DL (ref 3.5–5.2)
ALBUMIN/GLOB SERPL: 0.9 (ref 1.1–2.2)
ALP SERPL-CCNC: 236 U/L (ref 35–104)
ALT SERPL-CCNC: 23 U/L (ref 10–35)
ANION GAP SERPL CALC-SCNC: 13 MMOL/L (ref 2–12)
AST SERPL-CCNC: 24 U/L (ref 10–35)
BASOPHILS # BLD: 0 K/UL (ref 0–1)
BASOPHILS NFR BLD: 0 % (ref 0–1)
BILIRUB SERPL-MCNC: 1.9 MG/DL (ref 0.2–1)
BUN SERPL-MCNC: 27 MG/DL (ref 6–20)
BUN/CREAT SERPL: 37 (ref 12–20)
CALCIUM SERPL-MCNC: 9.2 MG/DL (ref 8.6–10)
CHLORIDE SERPL-SCNC: 97 MMOL/L (ref 98–107)
CO2 SERPL-SCNC: 26 MMOL/L (ref 22–29)
COMMENT:: NORMAL
CREAT SERPL-MCNC: 0.73 MG/DL (ref 0.5–0.9)
DIFFERENTIAL METHOD BLD: ABNORMAL
EOSINOPHIL # BLD: 0.1 K/UL (ref 0–0.4)
EOSINOPHIL NFR BLD: 1 %
ERYTHROCYTE [DISTWIDTH] IN BLOOD BY AUTOMATED COUNT: 13.8 % (ref 11.5–14.5)
GLOBULIN SER CALC-MCNC: 5 G/DL (ref 2–4)
GLUCOSE SERPL-MCNC: 92 MG/DL (ref 65–100)
HCT VFR BLD AUTO: 43.2 % (ref 35–47)
HGB BLD-MCNC: 14.9 G/DL (ref 11.5–16)
IMM GRANULOCYTES # BLD AUTO: 0.1 K/UL (ref 0–0.04)
IMM GRANULOCYTES NFR BLD AUTO: 0 % (ref 0–0.5)
LIPASE SERPL-CCNC: 14 U/L (ref 13–60)
LYMPHOCYTES # BLD: 2.4 K/UL (ref 0.8–3.5)
LYMPHOCYTES NFR BLD: 21 % (ref 12–49)
MCH RBC QN AUTO: 35.9 PG (ref 26–34)
MCHC RBC AUTO-ENTMCNC: 34.5 G/DL (ref 30–36.5)
MCV RBC AUTO: 104.1 FL (ref 80–99)
MONOCYTES # BLD: 0.8 K/UL (ref 0–1)
MONOCYTES NFR BLD: 7 % (ref 5–13)
NEUTS SEG # BLD: 8.3 K/UL (ref 1.8–8)
NEUTS SEG NFR BLD: 71 % (ref 32–75)
NRBC # BLD: 0 K/UL (ref 0–0.01)
NRBC BLD-RTO: 0 PER 100 WBC
PLATELET # BLD AUTO: 144 K/UL (ref 150–400)
PMV BLD AUTO: 10.4 FL (ref 8.9–12.9)
POTASSIUM SERPL-SCNC: 3.7 MMOL/L (ref 3.5–5.1)
PROCALCITONIN SERPL-MCNC: 0.13 NG/ML
PROT SERPL-MCNC: 9.3 G/DL (ref 6.4–8.3)
RBC # BLD AUTO: 4.15 M/UL (ref 3.8–5.2)
SODIUM SERPL-SCNC: 136 MMOL/L (ref 136–145)
SPECIMEN HOLD: NORMAL
WBC # BLD AUTO: 11.7 K/UL (ref 3.6–11)

## 2024-12-23 PROCEDURE — 80053 COMPREHEN METABOLIC PANEL: CPT

## 2024-12-23 PROCEDURE — 6360000002 HC RX W HCPCS: Performed by: STUDENT IN AN ORGANIZED HEALTH CARE EDUCATION/TRAINING PROGRAM

## 2024-12-23 PROCEDURE — 2500000003 HC RX 250 WO HCPCS: Performed by: STUDENT IN AN ORGANIZED HEALTH CARE EDUCATION/TRAINING PROGRAM

## 2024-12-23 PROCEDURE — 6360000004 HC RX CONTRAST MEDICATION: Performed by: STUDENT IN AN ORGANIZED HEALTH CARE EDUCATION/TRAINING PROGRAM

## 2024-12-23 PROCEDURE — 99285 EMERGENCY DEPT VISIT HI MDM: CPT

## 2024-12-23 PROCEDURE — 87040 BLOOD CULTURE FOR BACTERIA: CPT

## 2024-12-23 PROCEDURE — 6370000000 HC RX 637 (ALT 250 FOR IP): Performed by: STUDENT IN AN ORGANIZED HEALTH CARE EDUCATION/TRAINING PROGRAM

## 2024-12-23 PROCEDURE — 96361 HYDRATE IV INFUSION ADD-ON: CPT

## 2024-12-23 PROCEDURE — 6360000002 HC RX W HCPCS: Performed by: INTERNAL MEDICINE

## 2024-12-23 PROCEDURE — 87205 SMEAR GRAM STAIN: CPT

## 2024-12-23 PROCEDURE — 87070 CULTURE OTHR SPECIMN AEROBIC: CPT

## 2024-12-23 PROCEDURE — 1100000000 HC RM PRIVATE

## 2024-12-23 PROCEDURE — 2500000003 HC RX 250 WO HCPCS: Performed by: NURSE PRACTITIONER

## 2024-12-23 PROCEDURE — 2580000003 HC RX 258: Performed by: STUDENT IN AN ORGANIZED HEALTH CARE EDUCATION/TRAINING PROGRAM

## 2024-12-23 PROCEDURE — 74177 CT ABD & PELVIS W/CONTRAST: CPT

## 2024-12-23 PROCEDURE — 71045 X-RAY EXAM CHEST 1 VIEW: CPT

## 2024-12-23 PROCEDURE — 94761 N-INVAS EAR/PLS OXIMETRY MLT: CPT

## 2024-12-23 PROCEDURE — 2580000003 HC RX 258: Performed by: INTERNAL MEDICINE

## 2024-12-23 PROCEDURE — 6370000000 HC RX 637 (ALT 250 FOR IP): Performed by: INTERNAL MEDICINE

## 2024-12-23 PROCEDURE — 96375 TX/PRO/DX INJ NEW DRUG ADDON: CPT

## 2024-12-23 PROCEDURE — 85025 COMPLETE CBC W/AUTO DIFF WBC: CPT

## 2024-12-23 PROCEDURE — 36415 COLL VENOUS BLD VENIPUNCTURE: CPT

## 2024-12-23 PROCEDURE — 87186 SC STD MICRODIL/AGAR DIL: CPT

## 2024-12-23 PROCEDURE — 87077 CULTURE AEROBIC IDENTIFY: CPT

## 2024-12-23 PROCEDURE — 83690 ASSAY OF LIPASE: CPT

## 2024-12-23 PROCEDURE — 87147 CULTURE TYPE IMMUNOLOGIC: CPT

## 2024-12-23 PROCEDURE — 84145 PROCALCITONIN (PCT): CPT

## 2024-12-23 PROCEDURE — 96374 THER/PROPH/DIAG INJ IV PUSH: CPT

## 2024-12-23 RX ORDER — POTASSIUM CHLORIDE 750 MG/1
40 TABLET, EXTENDED RELEASE ORAL PRN
Status: DISCONTINUED | OUTPATIENT
Start: 2024-12-23 | End: 2024-12-25 | Stop reason: HOSPADM

## 2024-12-23 RX ORDER — SODIUM CHLORIDE 0.9 % (FLUSH) 0.9 %
5-40 SYRINGE (ML) INJECTION EVERY 12 HOURS SCHEDULED
Status: DISCONTINUED | OUTPATIENT
Start: 2024-12-23 | End: 2024-12-25 | Stop reason: HOSPADM

## 2024-12-23 RX ORDER — DIATRIZOATE MEGLUMINE AND DIATRIZOATE SODIUM 660; 100 MG/ML; MG/ML
30 SOLUTION ORAL; RECTAL
Status: DISCONTINUED | OUTPATIENT
Start: 2024-12-23 | End: 2024-12-25 | Stop reason: HOSPADM

## 2024-12-23 RX ORDER — ACETAMINOPHEN 650 MG/1
650 SUPPOSITORY RECTAL EVERY 6 HOURS PRN
Status: DISCONTINUED | OUTPATIENT
Start: 2024-12-23 | End: 2024-12-25 | Stop reason: HOSPADM

## 2024-12-23 RX ORDER — CETIRIZINE HYDROCHLORIDE 10 MG/1
10 TABLET ORAL DAILY
Status: DISCONTINUED | OUTPATIENT
Start: 2024-12-24 | End: 2024-12-25 | Stop reason: HOSPADM

## 2024-12-23 RX ORDER — SODIUM CHLORIDE 0.9 % (FLUSH) 0.9 %
5-40 SYRINGE (ML) INJECTION PRN
Status: DISCONTINUED | OUTPATIENT
Start: 2024-12-23 | End: 2024-12-25 | Stop reason: HOSPADM

## 2024-12-23 RX ORDER — ACETAMINOPHEN 325 MG/1
650 TABLET ORAL EVERY 6 HOURS PRN
Status: DISCONTINUED | OUTPATIENT
Start: 2024-12-23 | End: 2024-12-25 | Stop reason: HOSPADM

## 2024-12-23 RX ORDER — POLYETHYLENE GLYCOL 3350 17 G/17G
17 POWDER, FOR SOLUTION ORAL DAILY PRN
Status: DISCONTINUED | OUTPATIENT
Start: 2024-12-23 | End: 2024-12-25 | Stop reason: HOSPADM

## 2024-12-23 RX ORDER — MULTIVITAMIN WITH IRON
1 TABLET ORAL DAILY
Status: DISCONTINUED | OUTPATIENT
Start: 2024-12-24 | End: 2024-12-25 | Stop reason: HOSPADM

## 2024-12-23 RX ORDER — MAGNESIUM SULFATE IN WATER 40 MG/ML
2000 INJECTION, SOLUTION INTRAVENOUS PRN
Status: DISCONTINUED | OUTPATIENT
Start: 2024-12-23 | End: 2024-12-25 | Stop reason: HOSPADM

## 2024-12-23 RX ORDER — NALOXONE HYDROCHLORIDE 0.4 MG/ML
0.4 INJECTION, SOLUTION INTRAMUSCULAR; INTRAVENOUS; SUBCUTANEOUS PRN
Status: DISCONTINUED | OUTPATIENT
Start: 2024-12-23 | End: 2024-12-25 | Stop reason: HOSPADM

## 2024-12-23 RX ORDER — MORPHINE SULFATE 15 MG/1
30 TABLET ORAL EVERY 8 HOURS PRN
Status: DISCONTINUED | OUTPATIENT
Start: 2024-12-23 | End: 2024-12-25 | Stop reason: HOSPADM

## 2024-12-23 RX ORDER — POTASSIUM CHLORIDE 7.45 MG/ML
10 INJECTION INTRAVENOUS PRN
Status: DISCONTINUED | OUTPATIENT
Start: 2024-12-23 | End: 2024-12-25 | Stop reason: HOSPADM

## 2024-12-23 RX ORDER — ONDANSETRON 4 MG/1
4 TABLET, ORALLY DISINTEGRATING ORAL EVERY 8 HOURS PRN
Status: DISCONTINUED | OUTPATIENT
Start: 2024-12-23 | End: 2024-12-25 | Stop reason: HOSPADM

## 2024-12-23 RX ORDER — MORPHINE SULFATE 15 MG/1
15 TABLET, FILM COATED, EXTENDED RELEASE ORAL 3 TIMES DAILY
Status: DISCONTINUED | OUTPATIENT
Start: 2024-12-23 | End: 2024-12-25 | Stop reason: HOSPADM

## 2024-12-23 RX ORDER — SODIUM CHLORIDE 9 MG/ML
INJECTION, SOLUTION INTRAVENOUS PRN
Status: DISCONTINUED | OUTPATIENT
Start: 2024-12-23 | End: 2024-12-25 | Stop reason: HOSPADM

## 2024-12-23 RX ORDER — ACETAMINOPHEN 500 MG
1000 TABLET ORAL
Status: COMPLETED | OUTPATIENT
Start: 2024-12-23 | End: 2024-12-23

## 2024-12-23 RX ORDER — PANTOPRAZOLE SODIUM 40 MG/10ML
40 INJECTION, POWDER, LYOPHILIZED, FOR SOLUTION INTRAVENOUS DAILY
Status: DISCONTINUED | OUTPATIENT
Start: 2024-12-23 | End: 2024-12-25 | Stop reason: HOSPADM

## 2024-12-23 RX ORDER — 0.9 % SODIUM CHLORIDE 0.9 %
1000 INTRAVENOUS SOLUTION INTRAVENOUS ONCE
Status: COMPLETED | OUTPATIENT
Start: 2024-12-23 | End: 2024-12-23

## 2024-12-23 RX ORDER — IOPAMIDOL 755 MG/ML
100 INJECTION, SOLUTION INTRAVASCULAR
Status: COMPLETED | OUTPATIENT
Start: 2024-12-23 | End: 2024-12-23

## 2024-12-23 RX ORDER — ESCITALOPRAM OXALATE 10 MG/1
10 TABLET ORAL DAILY
Status: DISCONTINUED | OUTPATIENT
Start: 2024-12-24 | End: 2024-12-25 | Stop reason: HOSPADM

## 2024-12-23 RX ORDER — ONDANSETRON 2 MG/ML
4 INJECTION INTRAMUSCULAR; INTRAVENOUS ONCE
Status: COMPLETED | OUTPATIENT
Start: 2024-12-23 | End: 2024-12-23

## 2024-12-23 RX ORDER — MIDODRINE HYDROCHLORIDE 5 MG/1
5 TABLET ORAL
Status: DISCONTINUED | OUTPATIENT
Start: 2024-12-23 | End: 2024-12-25 | Stop reason: HOSPADM

## 2024-12-23 RX ORDER — ONDANSETRON 2 MG/ML
4 INJECTION INTRAMUSCULAR; INTRAVENOUS EVERY 6 HOURS PRN
Status: DISCONTINUED | OUTPATIENT
Start: 2024-12-23 | End: 2024-12-25 | Stop reason: HOSPADM

## 2024-12-23 RX ADMIN — VANCOMYCIN HYDROCHLORIDE 1250 MG: 1.25 INJECTION, POWDER, LYOPHILIZED, FOR SOLUTION INTRAVENOUS at 21:44

## 2024-12-23 RX ADMIN — VANCOMYCIN HYDROCHLORIDE 1000 MG: 1 INJECTION, POWDER, LYOPHILIZED, FOR SOLUTION INTRAVENOUS at 10:14

## 2024-12-23 RX ADMIN — ONDANSETRON 4 MG: 2 INJECTION, SOLUTION INTRAMUSCULAR; INTRAVENOUS at 07:35

## 2024-12-23 RX ADMIN — ONDANSETRON 4 MG: 2 INJECTION, SOLUTION INTRAMUSCULAR; INTRAVENOUS at 18:47

## 2024-12-23 RX ADMIN — HYDROMORPHONE HYDROCHLORIDE 1 MG: 1 INJECTION, SOLUTION INTRAMUSCULAR; INTRAVENOUS; SUBCUTANEOUS at 07:36

## 2024-12-23 RX ADMIN — MORPHINE SULFATE 15 MG: 15 TABLET, FILM COATED, EXTENDED RELEASE ORAL at 14:00

## 2024-12-23 RX ADMIN — SODIUM CHLORIDE 1000 ML: 9 INJECTION, SOLUTION INTRAVENOUS at 09:30

## 2024-12-23 RX ADMIN — MIDODRINE HYDROCHLORIDE 5 MG: 5 TABLET ORAL at 16:07

## 2024-12-23 RX ADMIN — MIDODRINE HYDROCHLORIDE 5 MG: 5 TABLET ORAL at 14:03

## 2024-12-23 RX ADMIN — PANTOPRAZOLE SODIUM 40 MG: 40 INJECTION, POWDER, FOR SOLUTION INTRAVENOUS at 14:03

## 2024-12-23 RX ADMIN — MICONAZOLE NITRATE: 2 POWDER TOPICAL at 23:19

## 2024-12-23 RX ADMIN — MORPHINE SULFATE 15 MG: 15 TABLET, FILM COATED, EXTENDED RELEASE ORAL at 20:53

## 2024-12-23 RX ADMIN — IOPAMIDOL 100 ML: 755 INJECTION, SOLUTION INTRAVENOUS at 07:52

## 2024-12-23 RX ADMIN — VANCOMYCIN HYDROCHLORIDE 1000 MG: 1 INJECTION, POWDER, LYOPHILIZED, FOR SOLUTION INTRAVENOUS at 10:04

## 2024-12-23 RX ADMIN — DIATRIZOATE MEGLUMINE AND DIATRIZOATE SODIUM 30 ML: 660; 100 LIQUID ORAL; RECTAL at 07:51

## 2024-12-23 RX ADMIN — SODIUM CHLORIDE, PRESERVATIVE FREE 10 ML: 5 INJECTION INTRAVENOUS at 20:53

## 2024-12-23 RX ADMIN — ACETAMINOPHEN 1000 MG: 500 TABLET ORAL at 09:57

## 2024-12-23 RX ADMIN — MORPHINE SULFATE 30 MG: 15 TABLET ORAL at 16:07

## 2024-12-23 RX ADMIN — HYDROMORPHONE HYDROCHLORIDE 0.5 MG: 1 INJECTION, SOLUTION INTRAMUSCULAR; INTRAVENOUS; SUBCUTANEOUS at 20:52

## 2024-12-23 ASSESSMENT — PAIN SCALES - GENERAL
PAINLEVEL_OUTOF10: 6
PAINLEVEL_OUTOF10: 0
PAINLEVEL_OUTOF10: 10
PAINLEVEL_OUTOF10: 7
PAINLEVEL_OUTOF10: 8
PAINLEVEL_OUTOF10: 3
PAINLEVEL_OUTOF10: 6
PAINLEVEL_OUTOF10: 8
PAINLEVEL_OUTOF10: 5
PAINLEVEL_OUTOF10: 6

## 2024-12-23 ASSESSMENT — PAIN DESCRIPTION - LOCATION
LOCATION: ABDOMEN

## 2024-12-23 ASSESSMENT — LIFESTYLE VARIABLES
HOW MANY STANDARD DRINKS CONTAINING ALCOHOL DO YOU HAVE ON A TYPICAL DAY: PATIENT DOES NOT DRINK
HOW OFTEN DO YOU HAVE A DRINK CONTAINING ALCOHOL: NEVER

## 2024-12-23 ASSESSMENT — PAIN DESCRIPTION - DESCRIPTORS: DESCRIPTORS: STABBING

## 2024-12-23 ASSESSMENT — PAIN DESCRIPTION - FREQUENCY: FREQUENCY: INTERMITTENT

## 2024-12-23 ASSESSMENT — PAIN - FUNCTIONAL ASSESSMENT
PAIN_FUNCTIONAL_ASSESSMENT: 0-10
PAIN_FUNCTIONAL_ASSESSMENT: ACTIVITIES ARE NOT PREVENTED

## 2024-12-23 NOTE — H&P
[facility]  [address], [place]  [id number] [phone]    Hospital Medicine History and Physical      NAME:       Cindy Ortega   :       1975   MRN:      [id number]     Date of service:   2024     Chief  Complaint:  Abdominal wall wound infection     History Of Presenting Illness:       Ms. Ortega is a 49 y.o. female who is being admitted for cellulitis of abdominal wall with a possible small abscess. Ms. Ortega presented to our [facility] today complaining of a worsening abdominal wall pain associated with worsening redness. She has a Hx of Crohn's for which she has had colectomy and a chronic abdominal wound since . She follows at the wound care center and was last seen there 24 and was assessed. No fluid was noted at that time. She now has subjective fever and chills. In the ED, a CT scan abdomen and pelvis done showed an anterior abdominal wall fluid collection on the right is new. She will be admitted for further management.      Allergies   Allergen Reactions    Macadamia Nut Oil Anaphylaxis    Meperidine Rash     Other reaction(s): Unknown (comments)  Other reaction(s): SEVERE NAUSEA  Reaction Type: Allergy    Sulfa Antibiotics Anaphylaxis    Nisoldipine     Peanut-Containing Drug Products     Pecan Nut (Diagnostic)     Sumatriptan Nausea Only    Carisoprodol Nausea Only, Other (See Comments) and Rash     Other reaction(s): RASH, ITCHING  Reaction Type: Allergy; Reaction(s): hives and itching    Ketorolac Tromethamine Nausea And Vomiting       Prior to Admission medications    Medication Sig Start Date End Date Taking? Authorizing Provider   fluticasone (FLONASE) 50 MCG/ACT nasal spray instill 2 sprays INTRANASALLY daily at bedtime for 7 days 24   Yaniar Raymond MD   COVID-19 At Home Antigen  in no acute distress  Eyes: Pink conjunctivae, PERRLA with no discharge. Normal eye movements  Ear, Nose, Mouth & Throat: No ottorrhea, rhinorrhea, non tender sinuses, moist mucous membranes  Respiratory:  No accessory muscle use, clear breath sounds without crackles or wheezes  Cardiovascular:  No JVD or murmurs, regular and normal S1, S2 without thrills, bruits or peripheral edema. Capillary refil+, good distal pulses  GI & :  Soft abdomen, tender with induration at the silver-umbilical area with some purulent discharge. Non-distended, normoactive bowel sounds  Musculoskeletal:  No cyanosis, clubbing, atrophy or deformities  Skin:  No rashes, bruising or ulcers   Neurological: Awake and alert, speech is clear, CNs 2-12 are grossly intact and otherwise non focal  Psychiatric:  Has a fair insight and is oriented x 3  ________________________________________________________________________    Data Review: I have reviewed reports and independently interpreted the following  diagnostic tests    Diagnostic testing:    Laboratory data reviewed and independently interpreted:    Recent Labs     12/23/24  0654   WBC 11.7*   HGB 14.9   HCT 43.2   RBC 4.15   .1*   MCH 35.9*   *     No results found for: \"LACTA\"  Recent Labs     12/23/24  0654      K 3.7   CL 97*   CO2 26   GLUCOSE 92   BUN 27*   CREATININE 0.73   CALCIUM 9.2   BILITOT 1.9*   ALKPHOS 236*   AST 24   ALT 23     No components found for: \"GLUCOSEPOC\"  Lab Results   Component Value Date/Time    CHOL 204 11/02/2020 02:50 PM    TRIG 46 09/25/2024 03:49 AM    HDL 58 11/02/2020 02:50 PM     Imaging data reviewed:    CT ABDOMEN PELVIS W IV CONTRAST Additional Contrast? Oral    Result Date: 12/23/2024  1. Anterior abdominal wall fluid collection on the right is new. 2. Other findings appear stable. Electronically signed by MIMA ROSADO MD    I have also reviewed available old medical records.     Assessment & Impression:     Ms. Ortega is a 49 y.o.

## 2024-12-23 NOTE — ED NOTES
TRANSFER - OUT REPORT:    Verbal report given to KENDY Hair on Cindy Ortega for routine progression of patient care       Report consisted of patient's Situation, Background, Assessment and   Recommendations(SBAR).     Information from the following report(s) Nurse Handoff Report, ED Encounter Summary, ED SBAR, and MAR was reviewed with the receiving nurse.    [place] Fall Assessment:    Presents to emergency department  because of falls (Syncope, seizure, or loss of consciousness): No  Age > 70: No  Altered Mental Status, Intoxication with alcohol or substance confusion (Disorientation, impaired judgment, poor safety awaremess, or inability to follow instructions): No  Impaired Mobility: Ambulates or transfers with assistive devices or assistance; Unable to ambulate or transer.: No  Nursing Judgement: No          Lines:   Implantable Port 09/23/24 Right Subclavian (Active)       Peripheral IV 12/23/24 Right Antecubital (Active)        Opportunity for questions and clarification was provided.

## 2024-12-23 NOTE — PROGRESS NOTES
[facility] Dosing Services: Antimicrobial Stewardship Daily Doc  Consult for antibiotic dosing of vancomycin by Dr. Castro  Indication: SSTI  Day of Therapy: 1 of 7    Ht Readings from Last 1 Encounters:   12/23/24 1.575 m (5' 2\")        Wt Readings from Last 1 Encounters:   12/23/24 83.5 kg (184 lb)      Vancomycin therapy:  Loading dose: Vancomycin 2000 mg x1 dose now/given 12/23 at 10 am  Maintenance dose: Vancomycin 1250 mg IV every 12 hours to achieve and AUC of 566 at steady state.  Dose calculated to approximate a           a. Target AUC/TREY of 400-600          b. Trough of 15-20 mcg/mL   Plan: Plan for a level in 24 to 48 hours.      Other Antimicrobial   (not dosed by pharmacist)    Cultures    Serum Creatinine Lab Results   Component Value Date/Time    CREATININE 0.73 12/23/2024 06:54 AM          Creatinine Clearance Estimated Creatinine Clearance: 96 mL/min (based on SCr of 0.73 mg/dL).     Temp Temp: 98.1 °F (36.7 °C) (Oral)       WBC Lab Results   Component Value Date/Time    WBC 11.7 12/23/2024 06:54 AM          Procalcitonin Lab Results   Component Value Date/Time    PROCAL 0.13 12/23/2024 06:54 AM      For Antifungals, Metronidazole and Nafcillin: Lab Results   Component Value Date/Time    ALT 23 12/23/2024 06:54 AM    AST 24 12/23/2024 06:54 AM        Pharmacist:   Shannan Rodriguez, PharmD, BCPS    [phone]

## 2024-12-23 NOTE — CONSULTS
Nutrition Note    Consult for TPN - pt familiar to service. Pt seen in Sept. Chart reviewed.       Recs for TPN from previous admission below. Will see pt 12/24 for any updated home TPN information. Pt on home TPN. Recommend attempting to get similar TPN while admitted. While this would be custom, it would be beneficial for pt. Recommend D15/AA6. Cyclic over 12 hrs.      1st hour @  46 mL/hr  2-11 hrs @ 92 mL/hr  Last 1 hrs @ 46 mL/hr.      Total volume: 1012 mL.      Provides 60 g pro, 150 g Dextrose. Add lipids MWF, 250 mL, 20%. Total calories:  970 kcal.      Pt down ~6 lbs since previous admission in Sept. Currently 184 lbs.       In Sept pt had recently changed from TPN from 1500 mL every other day to 800 mL daily. Pt normally gets 60 g Pro, 115 g Dex, 25 g SMOF lipids.         Above recs get pt close to home TPN with slightly more volume and more dextrose. Monitor BG. If able to reduce Dextrose to 115 g, that would likely improve pts BG.      Pt drinks Ensure - vanilla at home. Eats for comfort/preference but likely absorbs <50% of intake due to short gut syndrome. Allergic to tree nuts.       Electronically signed by Nacho Madrid RD on 12/23/24 at 3:12 PM EST    Contact: [phone]

## 2024-12-23 NOTE — ED TRIAGE NOTES
Pt ambulatory in ED with c/o chills and abdominal pain from wound that has been there since 2021. Pt said that since Thursday the pain is much worse and it is more redness around it. Pt was seen by her wound care on Friday and he was not concerned with the look of the wound and said there was no fluid in it. Pt said she has had the chills for about a week and half, no fevers that she is aware of.

## 2024-12-23 NOTE — ED PROVIDER NOTES
[facility] [place]  EMERGENCY DEPARTMENT ENCOUNTER      Pt Name: Cindy Ortega  MRN: [id number]  Birthdate 1975  Date of evaluation: 12/23/2024  Provider: Olman Treviño MD    CHIEF COMPLAINT       Chief Complaint   Patient presents with    Wound Check    Abdominal Pain         HISTORY OF PRESENT ILLNESS   (Location/Symptom, Timing/Onset, Context/Setting, Quality, Duration, Modifying Factors, Severity)  Note limiting factors.   Patient is a 49-year-old female presented emergency department with acute abdominal pain that she rates as a 7/10 as well as intractable nausea and vomiting, chills.  Patient's been having intermittent chills for the last week and a half she has an extensive history of multiple abdominal surgeries secondary to Crohn's disease with ostomy and poor healing fistula communicating to the abdominal wall surface.  She is followed by wound care and been doing well and then had an acute change yesterday with redness, tenderness at fistula site.            Review of External Medical Records:     Nursing Notes were reviewed.    REVIEW OF SYSTEMS    (2-9 systems for level 4, 10 or more for level 5)     Review of Systems    Except as noted above the remainder of the review of systems was reviewed and negative.       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

## 2024-12-23 NOTE — ED NOTES
After multiple attempts at starting an IV this nurse notifies Charge, RN of need for ultrasound guided IV placement. Pt currently has a central line in place and states that she is a very hard stick and usually ultrasound IV's are challenging to start also for her. Provider notified.

## 2024-12-23 NOTE — ED NOTES
Received patient in signout.  49-year-old female history of Crohn's with multiple prior abdominal surgeries.  Has chronic abdominal wall fistula wound.  States over the past few days the wound has been more painful as well as red.    Patient signed out to me pending lab work, medications, CT abdomen with oral contrast.    ED Course as of 12/23/24 0930   Mon Dec 23, 2024   0705 Labs, oral contrast CT.   Pending results. [AS]   0902 CT ABDOMEN PELVIS W IV CONTRAST Additional Contrast? Oral  IMPRESSION:     1. Anterior abdominal wall fluid collection on the right is new.  2. Other findings appear stable.   [AS]      ED Course User Index  [AS] Kimo Hutchinson MD     Perfect Serve Consult for Admission  9:30 AM    ED Room Number: C09/C09  Patient Name and age:  Cindy Ortega 49 y.o.  female  Working Diagnosis:   1. Abdominal wall fluid collections    2. Cellulitis of abdominal wall        COVID-19 Suspicion: No  Sepsis present:  No  Reassessment needed: No  Code Status:  Full Code  Readmission: No  Isolation Requirements: no  Recommended Level of Care: med/surg  Department: [place] ED - [phone]  Consulting Provider: n/a    Other:   49-year-old with Crohn's status post ostomy with chronic abdominal wall fistula, here for increasing pain and redness to the fistula with nausea, vomiting, chills.  The area is draining and erythematous. CT is showing a new anterior abdominal wall fluid collection.  Starting vancomycin.  She is followed closely by wound care here Dr. Bustamante.     Kimo Hutchinson MD  12/23/24 [id number]

## 2024-12-24 PROBLEM — E44.1 MILD PROTEIN-ENERGY MALNUTRITION (HCC): Status: ACTIVE | Noted: 2024-12-24

## 2024-12-24 PROCEDURE — 6370000000 HC RX 637 (ALT 250 FOR IP): Performed by: INTERNAL MEDICINE

## 2024-12-24 PROCEDURE — 2580000003 HC RX 258: Performed by: INTERNAL MEDICINE

## 2024-12-24 PROCEDURE — 94761 N-INVAS EAR/PLS OXIMETRY MLT: CPT

## 2024-12-24 PROCEDURE — 6360000002 HC RX W HCPCS: Performed by: INTERNAL MEDICINE

## 2024-12-24 PROCEDURE — 6360000002 HC RX W HCPCS: Performed by: STUDENT IN AN ORGANIZED HEALTH CARE EDUCATION/TRAINING PROGRAM

## 2024-12-24 PROCEDURE — 2500000003 HC RX 250 WO HCPCS: Performed by: INTERNAL MEDICINE

## 2024-12-24 PROCEDURE — 6370000000 HC RX 637 (ALT 250 FOR IP): Performed by: STUDENT IN AN ORGANIZED HEALTH CARE EDUCATION/TRAINING PROGRAM

## 2024-12-24 PROCEDURE — 1100000000 HC RM PRIVATE

## 2024-12-24 PROCEDURE — 2500000003 HC RX 250 WO HCPCS: Performed by: STUDENT IN AN ORGANIZED HEALTH CARE EDUCATION/TRAINING PROGRAM

## 2024-12-24 RX ADMIN — VANCOMYCIN HYDROCHLORIDE 1250 MG: 1.25 INJECTION, POWDER, LYOPHILIZED, FOR SOLUTION INTRAVENOUS at 21:16

## 2024-12-24 RX ADMIN — MICONAZOLE NITRATE: 2 POWDER TOPICAL at 21:17

## 2024-12-24 RX ADMIN — MIDODRINE HYDROCHLORIDE 5 MG: 5 TABLET ORAL at 08:59

## 2024-12-24 RX ADMIN — MICONAZOLE NITRATE: 2 POWDER TOPICAL at 09:07

## 2024-12-24 RX ADMIN — MORPHINE SULFATE 15 MG: 15 TABLET, FILM COATED, EXTENDED RELEASE ORAL at 08:57

## 2024-12-24 RX ADMIN — ESCITALOPRAM OXALATE 10 MG: 10 TABLET ORAL at 08:59

## 2024-12-24 RX ADMIN — HYDROMORPHONE HYDROCHLORIDE 0.5 MG: 1 INJECTION, SOLUTION INTRAMUSCULAR; INTRAVENOUS; SUBCUTANEOUS at 05:12

## 2024-12-24 RX ADMIN — SODIUM CHLORIDE, PRESERVATIVE FREE 10 ML: 5 INJECTION INTRAVENOUS at 09:00

## 2024-12-24 RX ADMIN — ACETAMINOPHEN 650 MG: 325 TABLET ORAL at 05:12

## 2024-12-24 RX ADMIN — Medication 1 TABLET: at 08:57

## 2024-12-24 RX ADMIN — ONDANSETRON 4 MG: 2 INJECTION, SOLUTION INTRAMUSCULAR; INTRAVENOUS at 11:15

## 2024-12-24 RX ADMIN — VANCOMYCIN HYDROCHLORIDE 1250 MG: 1.25 INJECTION, POWDER, LYOPHILIZED, FOR SOLUTION INTRAVENOUS at 09:06

## 2024-12-24 RX ADMIN — WATER 2000 MG: 1 INJECTION INTRAMUSCULAR; INTRAVENOUS; SUBCUTANEOUS at 12:01

## 2024-12-24 RX ADMIN — MORPHINE SULFATE 15 MG: 15 TABLET, FILM COATED, EXTENDED RELEASE ORAL at 15:08

## 2024-12-24 RX ADMIN — MIDODRINE HYDROCHLORIDE 5 MG: 5 TABLET ORAL at 12:01

## 2024-12-24 RX ADMIN — CETIRIZINE HYDROCHLORIDE 10 MG: 10 TABLET, FILM COATED ORAL at 08:57

## 2024-12-24 RX ADMIN — PANTOPRAZOLE SODIUM 40 MG: 40 INJECTION, POWDER, FOR SOLUTION INTRAVENOUS at 08:59

## 2024-12-24 RX ADMIN — MORPHINE SULFATE 15 MG: 15 TABLET, FILM COATED, EXTENDED RELEASE ORAL at 20:21

## 2024-12-24 RX ADMIN — ZINC SULFATE: 25 INJECTION, SOLUTION INTRAVENOUS at 18:10

## 2024-12-24 ASSESSMENT — PAIN SCALES - GENERAL
PAINLEVEL_OUTOF10: 0
PAINLEVEL_OUTOF10: 4
PAINLEVEL_OUTOF10: 3
PAINLEVEL_OUTOF10: 8
PAINLEVEL_OUTOF10: 6
PAINLEVEL_OUTOF10: 4
PAINLEVEL_OUTOF10: 6
PAINLEVEL_OUTOF10: 0
PAINLEVEL_OUTOF10: 4
PAINLEVEL_OUTOF10: 0

## 2024-12-24 ASSESSMENT — PAIN DESCRIPTION - DESCRIPTORS
DESCRIPTORS: ACHING;SHARP
DESCRIPTORS: SHARP;BURNING
DESCRIPTORS: ACHING

## 2024-12-24 ASSESSMENT — PAIN - FUNCTIONAL ASSESSMENT: PAIN_FUNCTIONAL_ASSESSMENT: ACTIVITIES ARE NOT PREVENTED

## 2024-12-24 ASSESSMENT — PAIN DESCRIPTION - LOCATION
LOCATION: ABDOMEN;BACK
LOCATION: HEAD;ABDOMEN
LOCATION: ABDOMEN;BACK

## 2024-12-24 ASSESSMENT — PAIN DESCRIPTION - FREQUENCY: FREQUENCY: INTERMITTENT
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
## 2024-12-24 NOTE — PROGRESS NOTES
General Surgery Daily Progress Note    Patient: Cindy Ortega MRN: [id number]  SSN: [id number]    YOB: 1975  Age: 49 y.o.  Sex: female      Admit Date: 12/23/2024    POD * No surgery found *    Procedure: * No surgery found *    Subjective:   Patient  feels better   Wound draining    Current Facility-Administered Medications   Medication Dose Route Frequency    cefTRIAXone (ROCEPHIN) 2,000 mg in sterile water 20 mL IV syringe  2,000 mg IntraVENous Q24H    Adult TPN 2-in-1 - Central Line (Cyclic Premix Builder) without Lipids   IntraVENous Q24H    diatrizoate meglumine-sodium (GASTROGRAFIN) 66-10 % solution 30 mL  30 mL Oral ONCE PRN    sodium chloride flush 0.9 % injection 5-40 mL  5-40 mL IntraVENous 2 times per day    sodium chloride flush 0.9 % injection 5-40 mL  5-40 mL IntraVENous PRN    0.9 % sodium chloride infusion   IntraVENous PRN    potassium chloride (KLOR-CON) extended release tablet 40 mEq  40 mEq Oral PRN    Or    potassium bicarb-citric acid (EFFER-K) effervescent tablet 40 mEq  40 mEq Oral PRN    Or    potassium chloride 10 mEq/100 mL IVPB (Peripheral Line)  10 mEq IntraVENous PRN    magnesium sulfate 2000 mg in 50 mL IVPB premix  2,000 mg IntraVENous PRN    ondansetron (ZOFRAN-ODT) disintegrating tablet 4 mg  4 mg Oral Q8H PRN    Or    ondansetron (ZOFRAN) injection 4 mg  4 mg IntraVENous Q6H PRN    polyethylene glycol (GLYCOLAX) packet 17 g  17 g Oral Daily PRN    acetaminophen (TYLENOL) tablet 650 mg  650 mg Oral Q6H PRN    Or    acetaminophen (TYLENOL) suppository 650 mg  650 mg Rectal Q6H PRN    vancomycin (VANCOCIN) 1,250 mg in sodium chloride 0.9 % 250 mL IVPB ([id number])  1,250 mg IntraVENous Q12H    vitamin B and C (TOTAL B-C) tablet 1 tablet  1 tablet Oral Daily    escitalopram (LEXAPRO) tablet 10 mg  10 mg Oral Daily    cetirizine (ZYRTEC) tablet 10 mg  10 mg Oral Daily    midodrine (PROAMATINE) tablet 5 mg  5 mg Oral TID WC    morphine (MS CONTIN) extended release
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
## 2024-12-24 NOTE — PROGRESS NOTES
General surgery consult received. Patient seen and examined. Full consult to follow. Discussed with Dr. Mendez. No plan for immediate surgical intervention this admission. Plan to cont. IV abx. Can discharge on PO Augmentin TID for 2 week course when medically cleared. Follow up with her surgeons at [facility]. Recommend clear/full liquid diet with adequate protein intake to try to reduce fistula output.     Camila Peralta PA-C

## 2024-12-24 NOTE — CONSULTS
Michelle Rose is the consulting provider for the General Surgery  consult put in 12/24.   Meghan is not on call.

## 2024-12-24 NOTE — PROGRESS NOTES
[facility]  [address] [place] [address]  [phone]      Hospitalist  Progress Note      NAME:       Cindy Ortega   :        1975  MRM:        [id number]    Date of service: 2024      Subjective: Patient seen and examined by me. Patient admitted with an abdominal wall infection. She says she feels better today although still with drainage. No fever or chills. No nausea or vomiting.      Objective:    Vital Signs:    /74   Pulse 93   Temp 98.1 °F (36.7 °C) (Oral)   Resp 16   Ht 1.575 m (5' 2.01\")   Wt 83.5 kg (184 lb)   SpO2 99%   BMI 33.65 kg/m²        Intake/Output Summary (Last 24 hours) at 2024 1326  Last data filed at 2024 0301  Gross per 24 hour   Intake 800 ml   Output --   Net 800 ml        Current inpatient medications reviewed:  Current Facility-Administered Medications   Medication Dose Route Frequency    cefTRIAXone (ROCEPHIN) 2,000 mg in sterile water 20 mL IV syringe  2,000 mg IntraVENous Q24H    Adult TPN 2-in-1 - Central Line (Cyclic Premix Builder) without Lipids   IntraVENous Q24H    diatrizoate meglumine-sodium (GASTROGRAFIN) 66-10 % solution 30 mL  30 mL Oral ONCE PRN    sodium chloride flush 0.9 % injection 5-40 mL  5-40 mL IntraVENous 2 times per day    sodium chloride flush 0.9 % injection 5-40 mL  5-40 mL IntraVENous PRN    0.9 % sodium chloride infusion   IntraVENous PRN    potassium chloride (KLOR-CON) extended release tablet 40 mEq  40 mEq Oral PRN    Or    potassium bicarb-citric acid (EFFER-K) effervescent tablet 40 mEq  40 mEq Oral PRN    Or    potassium chloride 10 mEq/100 mL IVPB (Peripheral Line)  10 mEq IntraVENous PRN    magnesium sulfate 2000 mg in 50 mL IVPB premix  2,000 mg IntraVENous PRN    ondansetron (ZOFRAN-ODT) disintegrating tablet 4 mg  4 mg Oral Q8H PRN    Or    ondansetron (ZOFRAN) injection 4 mg  4 mg IntraVENous Q6H  PRN    polyethylene glycol (GLYCOLAX) packet 17 g  17 g Oral Daily PRN    acetaminophen (TYLENOL) tablet 650 mg  650 mg Oral Q6H PRN    Or    acetaminophen (TYLENOL) suppository 650 mg  650 mg Rectal Q6H PRN    vancomycin (VANCOCIN) 1,250 mg in sodium chloride 0.9 % 250 mL IVPB ([id number])  1,250 mg IntraVENous Q12H    vitamin B and C (TOTAL B-C) tablet 1 tablet  1 tablet Oral Daily    escitalopram (LEXAPRO) tablet 10 mg  10 mg Oral Daily    cetirizine (ZYRTEC) tablet 10 mg  10 mg Oral Daily    midodrine (PROAMATINE) tablet 5 mg  5 mg Oral TID WC    morphine (MS CONTIN) extended release tablet 15 mg  15 mg Oral TID    morphine (MSIR) tablet 30 mg  30 mg Oral Q8H PRN    pantoprazole (PROTONIX) injection 40 mg  40 mg IntraVENous Daily    naloxone (NARCAN) injection 0.4 mg  0.4 mg IntraVENous PRN    miconazole (MICOTIN) 2 % powder   Topical BID     Physical Examination:    General:   Weak and ill looking patient in no acute distress  Eyes:   pink conjunctivae, PERRLA with no discharge.  ENT:   no ottorrhea or rhinorrhea with dry mucous membranes  Neck: no masses, thyroid non-tender and trachea central.  Pulm: decreased but clear breath sounds without crackles or wheezes  Card:  no JVD or murmurs, has regular and normal S1, S2 without thrills, bruits or peripheral edema  Abd:  Soft, abdominal discomfort at site of infection, non-distended, normoactive bowel sounds   Musc:  No cyanosis, clubbing, atrophy or deformities.  Skin:  No rashes, bruising or ulcers.   Neuro: Awake and alert. Generally a non focal exam. Follows commands appropriately  Psych:  Has a fair insight and is oriented x 3    Diagnostic testing:    Laboratory data reviewed and independently interpreted:    Recent Labs     12/23/24  0654   WBC 11.7*   HGB 14.9   HCT 43.2   RBC 4.15   .1*   MCH 35.9*   *     No results found for: \"LACTA\"  Recent Labs     12/23/24  0654      K 3.7   CL 97*   CO2 26   GLUCOSE 92   BUN 27*   CREATININE 0.73

## 2024-12-24 NOTE — PROGRESS NOTES
1945  RN changed the PICC line dressing and informed the charge nurse and NP regarding the protocol before utilizing it, pair blood cultures and chest xray done. Peripheral line inserted.

## 2024-12-25 VITALS
TEMPERATURE: 97.7 F | HEIGHT: 62 IN | DIASTOLIC BLOOD PRESSURE: 76 MMHG | WEIGHT: 184 LBS | BODY MASS INDEX: 33.86 KG/M2 | SYSTOLIC BLOOD PRESSURE: 110 MMHG | OXYGEN SATURATION: 100 % | RESPIRATION RATE: 16 BRPM | HEART RATE: 84 BPM

## 2024-12-25 LAB
ANION GAP SERPL CALC-SCNC: 6 MMOL/L (ref 2–12)
BASOPHILS # BLD: 0 K/UL (ref 0–0.1)
BASOPHILS NFR BLD: 0 % (ref 0–1)
BUN SERPL-MCNC: 24 MG/DL (ref 6–20)
BUN/CREAT SERPL: 36 (ref 12–20)
CALCIUM SERPL-MCNC: 8.7 MG/DL (ref 8.5–10.1)
CHLORIDE SERPL-SCNC: 105 MMOL/L (ref 97–108)
CO2 SERPL-SCNC: 23 MMOL/L (ref 21–32)
CREAT SERPL-MCNC: 0.66 MG/DL (ref 0.55–1.02)
DIFFERENTIAL METHOD BLD: ABNORMAL
EOSINOPHIL # BLD: 0.2 K/UL (ref 0–0.4)
EOSINOPHIL NFR BLD: 2 % (ref 0–7)
ERYTHROCYTE [DISTWIDTH] IN BLOOD BY AUTOMATED COUNT: 13.5 % (ref 11.5–14.5)
GLUCOSE SERPL-MCNC: 114 MG/DL (ref 65–100)
HCT VFR BLD AUTO: 37 % (ref 35–47)
HGB BLD-MCNC: 12.2 G/DL (ref 11.5–16)
IMM GRANULOCYTES # BLD AUTO: 0 K/UL (ref 0–0.04)
IMM GRANULOCYTES NFR BLD AUTO: 0 % (ref 0–0.5)
LYMPHOCYTES # BLD: 3.4 K/UL (ref 0.8–3.5)
LYMPHOCYTES NFR BLD: 30 % (ref 12–49)
MAGNESIUM SERPL-MCNC: 1.7 MG/DL (ref 1.6–2.4)
MCH RBC QN AUTO: 35.7 PG (ref 26–34)
MCHC RBC AUTO-ENTMCNC: 33 G/DL (ref 30–36.5)
MCV RBC AUTO: 108.2 FL (ref 80–99)
MONOCYTES # BLD: 1.1 K/UL (ref 0–1)
MONOCYTES NFR BLD: 10 % (ref 5–13)
NEUTS SEG # BLD: 6.7 K/UL (ref 1.8–8)
NEUTS SEG NFR BLD: 58 % (ref 32–75)
NRBC # BLD: 0 K/UL (ref 0–0.01)
NRBC BLD-RTO: 0 PER 100 WBC
PHOSPHATE SERPL-MCNC: 2.4 MG/DL (ref 2.6–4.7)
PLATELET # BLD AUTO: 159 K/UL (ref 150–400)
PMV BLD AUTO: 9.9 FL (ref 8.9–12.9)
POTASSIUM SERPL-SCNC: 4.8 MMOL/L (ref 3.5–5.1)
RBC # BLD AUTO: 3.42 M/UL (ref 3.8–5.2)
RBC MORPH BLD: ABNORMAL
SODIUM SERPL-SCNC: 134 MMOL/L (ref 136–145)
TRIGL SERPL-MCNC: 68 MG/DL
VANCOMYCIN SERPL-MCNC: 19.1 UG/ML
WBC # BLD AUTO: 11.4 K/UL (ref 3.6–11)

## 2024-12-25 PROCEDURE — 6370000000 HC RX 637 (ALT 250 FOR IP): Performed by: INTERNAL MEDICINE

## 2024-12-25 PROCEDURE — 80048 BASIC METABOLIC PNL TOTAL CA: CPT

## 2024-12-25 PROCEDURE — 80202 ASSAY OF VANCOMYCIN: CPT

## 2024-12-25 PROCEDURE — 2500000003 HC RX 250 WO HCPCS: Performed by: STUDENT IN AN ORGANIZED HEALTH CARE EDUCATION/TRAINING PROGRAM

## 2024-12-25 PROCEDURE — 2580000003 HC RX 258: Performed by: STUDENT IN AN ORGANIZED HEALTH CARE EDUCATION/TRAINING PROGRAM

## 2024-12-25 PROCEDURE — 6370000000 HC RX 637 (ALT 250 FOR IP): Performed by: STUDENT IN AN ORGANIZED HEALTH CARE EDUCATION/TRAINING PROGRAM

## 2024-12-25 PROCEDURE — 94761 N-INVAS EAR/PLS OXIMETRY MLT: CPT

## 2024-12-25 PROCEDURE — 36415 COLL VENOUS BLD VENIPUNCTURE: CPT

## 2024-12-25 PROCEDURE — 6360000002 HC RX W HCPCS: Performed by: INTERNAL MEDICINE

## 2024-12-25 PROCEDURE — 6360000002 HC RX W HCPCS: Performed by: STUDENT IN AN ORGANIZED HEALTH CARE EDUCATION/TRAINING PROGRAM

## 2024-12-25 PROCEDURE — 84478 ASSAY OF TRIGLYCERIDES: CPT

## 2024-12-25 PROCEDURE — 84100 ASSAY OF PHOSPHORUS: CPT

## 2024-12-25 PROCEDURE — 83735 ASSAY OF MAGNESIUM: CPT

## 2024-12-25 PROCEDURE — 85025 COMPLETE CBC W/AUTO DIFF WBC: CPT

## 2024-12-25 RX ADMIN — MIDODRINE HYDROCHLORIDE 5 MG: 5 TABLET ORAL at 08:55

## 2024-12-25 RX ADMIN — SODIUM CHLORIDE, PRESERVATIVE FREE 10 ML: 5 INJECTION INTRAVENOUS at 08:56

## 2024-12-25 RX ADMIN — CETIRIZINE HYDROCHLORIDE 10 MG: 10 TABLET, FILM COATED ORAL at 08:55

## 2024-12-25 RX ADMIN — ESCITALOPRAM OXALATE 10 MG: 10 TABLET ORAL at 08:55

## 2024-12-25 RX ADMIN — VANCOMYCIN HYDROCHLORIDE 1000 MG: 1 INJECTION, POWDER, LYOPHILIZED, FOR SOLUTION INTRAVENOUS at 09:03

## 2024-12-25 RX ADMIN — MORPHINE SULFATE 30 MG: 15 TABLET ORAL at 02:14

## 2024-12-25 RX ADMIN — ACETAMINOPHEN 650 MG: 325 TABLET ORAL at 05:58

## 2024-12-25 RX ADMIN — Medication 1 TABLET: at 08:55

## 2024-12-25 RX ADMIN — MORPHINE SULFATE 15 MG: 15 TABLET, FILM COATED, EXTENDED RELEASE ORAL at 08:55

## 2024-12-25 RX ADMIN — PANTOPRAZOLE SODIUM 40 MG: 40 INJECTION, POWDER, FOR SOLUTION INTRAVENOUS at 08:55

## 2024-12-25 ASSESSMENT — PAIN DESCRIPTION - LOCATION
LOCATION: ABDOMEN;BACK

## 2024-12-25 ASSESSMENT — PAIN SCALES - GENERAL
PAINLEVEL_OUTOF10: 9
PAINLEVEL_OUTOF10: 0
PAINLEVEL_OUTOF10: 3
PAINLEVEL_OUTOF10: 8
PAINLEVEL_OUTOF10: 6

## 2024-12-25 ASSESSMENT — PAIN DESCRIPTION - DESCRIPTORS
DESCRIPTORS: ACHING
DESCRIPTORS: ACHING;STABBING
DESCRIPTORS: ACHING

## 2024-12-25 ASSESSMENT — PAIN DESCRIPTION - FREQUENCY: FREQUENCY: INTERMITTENT

## 2024-12-25 ASSESSMENT — PAIN - FUNCTIONAL ASSESSMENT
PAIN_FUNCTIONAL_ASSESSMENT: ACTIVITIES ARE NOT PREVENTED
PAIN_FUNCTIONAL_ASSESSMENT: PREVENTS OR INTERFERES SOME ACTIVE ACTIVITIES AND ADLS

## 2024-12-25 NOTE — CARE COORDINATION
Case Management Discharge Note    Discharge Plan:  Patient DC to home today with no CM needs identified.    Transportation at DC:  Family    ______________________________________  SHAMIR Polo, RN-CM  [facility]- Care Management  Available via [employer]  12/25/2024  11:59 AM

## 2024-12-25 NOTE — PROGRESS NOTES
0900: Discharge instructions given to patient. New dressing applied to PICC line. Time for questions allowed. Wound care teaching completed.  will transport home,

## 2024-12-25 NOTE — PROGRESS NOTES
[facility] Dosing Services: Antimicrobial Stewardship Daily Doc  Consult for antibiotic dosing of vancomycin by Dr. Castro   Indication: cellulitis abdominal wall, abscess   Day of Therapy: 3  No surgical interventions planned    Ht Readings from Last 1 Encounters:   12/24/24 1.575 m (5' 2.01\")        Wt Readings from Last 1 Encounters:   12/23/24 83.5 kg (184 lb)      Vancomycin therapy:  Loading dose: Vancomycin 2000 mg x1 dose given 12/23 @ 1000  Maintenance dose: Vancomycin 1250 mg IV every 12 hours   Dose calculated to approximate a           a. Target AUC/TREY of 400-600     Last level:19.1 mcg/ml obtained at 6 am on 12/25/2024      Plan: Level predicts a supra-therapeutic AUC  of 637 on the current regimen of 1250 mg IV every 12 hours. Change vancomycin to 1000 mg IV every 12 hours to achieve an AUC of 520. Scr is stable. WBC trending down. Patient is afebrile. Plan for another level in 48 hours.    Other Antimicrobial   (not dosed by pharmacist) Ceftriaxone 2g IV daily    Cultures 12/23 abdomen: occasional GPR in pairs, occasional GNRs (prelim)  12/3 Blood x2: NGTD x 12D(prelim)    Serum Creatinine Lab Results   Component Value Date/Time    CREATININE 0.66 12/25/2024 06:00 AM          Creatinine Clearance Estimated Creatinine Clearance: 106 mL/min (based on SCr of 0.66 mg/dL).     Temp Temp: 97.7 °F (36.5 °C) (Oral)       WBC Lab Results   Component Value Date/Time    WBC 11.4 12/25/2024 06:00 AM          Procalcitonin Lab Results   Component Value Date/Time    PROCAL 0.13 12/23/2024 06:54 AM      For Antifungals, Metronidazole and Nafcillin: Lab Results   Component Value Date/Time    ALT 23 12/23/2024 06:54 AM    AST 24 12/23/2024 06:54 AM        Pharmacist:   Shannan Rodriguez, PharmD, BCPS    [phone]

## 2024-12-25 NOTE — DISCHARGE SUMMARY
discharge her on p.o. Augmentin for 1 week.      Anxiety with depression POA: due to her illness. Continue Lexapro      Update: Received call from lab.  Wound culture positive for beta-hemolytic group F Streptococcus which is sensitive to penicillin and ampicillin.  Patient was already discharged on Augmentin.  Wound culture also grew light MRSA, sensitive to doxycycline.  We will send her doxycycline. Patient notified.       Imaging  XR CHEST PORTABLE    Result Date: 12/23/2024  PICC line as detailed. No Acute Disease. Electronically signed by ALEX MCINTOSH    CT ABDOMEN PELVIS W IV CONTRAST Additional Contrast? Oral    Result Date: 12/23/2024  1. Anterior abdominal wall fluid collection on the right is new. 2. Other findings appear stable. Electronically signed by MIMA ROSADO MD       PCP: No primary care provider on file.     Consults: general surgery    Condition of patient at discharge: Stable    Discharge Exam:    Physical Exam:    General:   Weak appearing, patient in no acute distress  Eyes:   pink conjunctivae, PERRLA with no discharge.  ENT:   no ottorrhea or rhinorrhea with dry mucous membranes  Neck: no masses, thyroid non-tender and trachea central.  Pulm: decreased but clear breath sounds without crackles or wheezes  Card:  no JVD or murmurs, has regular and normal S1, S2 without thrills, bruits or peripheral edema  Abd:  Soft, abdominal discomfort at site of infection, non-distended, normoactive bowel sounds , wounds dressed, no surrounding erythema  Musc:  No cyanosis, clubbing, atrophy or deformities.  Skin:  No rashes, bruising or ulcers.   Neuro: Awake and alert. Generally a non focal exam. Follows commands appropriately  Psych:  Has a fair insight and is oriented x 3          Disposition: home    Patient Instructions:      Medication List        START taking these medications      amoxicillin-clavulanate 875-125 MG per tablet  Commonly known as: AUGMENTIN  Take 1 tablet by mouth 2 times daily  Soln  Commonly known as: RECLAST           * This list has 2 medication(s) that are the same as other medications prescribed for you. Read the directions carefully, and ask your doctor or other care provider to review them with you.                STOP taking these medications      droNABinol 5 MG capsule  Commonly known as: MARINOL               Where to Get Your Medications        [id number] - [place], [place] - [address] - P [phone] - F [phone]  [address] [facility] [address]      Phone: [phone]   amoxicillin-clavulanate 875-125 MG per tablet        Activity: activity as tolerated  Diet: regular diet  Wound Care: as directed    Follow-up with No primary care provider on file. in 2 weeks.  Follow-up tests/labs None    Approximate time spent in patient care on day of discharge: More than 35 minutes    Signed:  Joesph Ortez MD  12/25/2024  10:03 AM

## 2024-12-26 LAB
BACTERIA SPEC CULT: ABNORMAL
GRAM STN SPEC: ABNORMAL
SERVICE CMNT-IMP: ABNORMAL

## 2024-12-26 RX ORDER — DOXYCYCLINE HYCLATE 100 MG
100 TABLET ORAL 2 TIMES DAILY
Qty: 14 TABLET | Refills: 0 | Status: SHIPPED | OUTPATIENT
Start: 2024-12-26 | End: 2025-01-02

## 2025-01-07 ENCOUNTER — PATIENT MESSAGE (OUTPATIENT)
Facility: CLINIC | Age: 50
End: 2025-01-07

## 2025-01-07 NOTE — TELEPHONE ENCOUNTER
Called the patient to get her scheduled for an appt after sending her the scheduling ticket via [employer]---unable to reach---[facility].

## 2025-01-17 ENCOUNTER — HOSPITAL ENCOUNTER (OUTPATIENT)
Facility: HOSPITAL | Age: 50
Discharge: HOME | End: 2025-01-17
Attending: STUDENT IN AN ORGANIZED HEALTH CARE EDUCATION/TRAINING PROGRAM

## 2025-01-17 VITALS
RESPIRATION RATE: 15 BRPM | SYSTOLIC BLOOD PRESSURE: 121 MMHG | TEMPERATURE: 98.2 F | HEART RATE: 81 BPM | DIASTOLIC BLOOD PRESSURE: 60 MMHG

## 2025-01-17 DIAGNOSIS — S31.109A CHRONIC ABDOMINAL WOUND INFECTION, INITIAL ENCOUNTER: ICD-10-CM

## 2025-01-17 DIAGNOSIS — L08.9 CHRONIC ABDOMINAL WOUND INFECTION, INITIAL ENCOUNTER: ICD-10-CM

## 2025-01-17 DIAGNOSIS — L03.311 CELLULITIS OF ABDOMINAL WALL: Primary | ICD-10-CM

## 2025-01-17 RX ORDER — BETAMETHASONE DIPROPIONATE 0.5 MG/G
CREAM TOPICAL ONCE
OUTPATIENT
Start: 2025-01-17 | End: 2025-01-17

## 2025-01-17 RX ORDER — LIDOCAINE HYDROCHLORIDE 20 MG/ML
JELLY TOPICAL ONCE
OUTPATIENT
Start: 2025-01-17 | End: 2025-01-17

## 2025-01-17 RX ORDER — SODIUM CHLOR/HYPOCHLOROUS ACID 0.033 %
SOLUTION, IRRIGATION IRRIGATION ONCE
OUTPATIENT
Start: 2025-01-17 | End: 2025-01-17

## 2025-01-17 RX ORDER — TRIAMCINOLONE ACETONIDE 1 MG/G
OINTMENT TOPICAL ONCE
OUTPATIENT
Start: 2025-01-17 | End: 2025-01-17

## 2025-01-17 RX ORDER — NEOMYCIN/BACITRACIN/POLYMYXINB 3.5-400-5K
OINTMENT (GRAM) TOPICAL ONCE
OUTPATIENT
Start: 2025-01-17 | End: 2025-01-17

## 2025-01-17 RX ORDER — LIDOCAINE 50 MG/G
OINTMENT TOPICAL ONCE
OUTPATIENT
Start: 2025-01-17 | End: 2025-01-17

## 2025-01-17 RX ORDER — SILVER SULFADIAZINE 10 MG/G
CREAM TOPICAL ONCE
OUTPATIENT
Start: 2025-01-17 | End: 2025-01-17

## 2025-01-17 RX ORDER — BACITRACIN ZINC AND POLYMYXIN B SULFATE 500; 1000 [USP'U]/G; [USP'U]/G
OINTMENT TOPICAL ONCE
OUTPATIENT
Start: 2025-01-17 | End: 2025-01-17

## 2025-01-17 RX ORDER — LIDOCAINE HYDROCHLORIDE 40 MG/ML
SOLUTION TOPICAL ONCE
OUTPATIENT
Start: 2025-01-17 | End: 2025-01-17

## 2025-01-17 RX ORDER — CLOBETASOL PROPIONATE 0.5 MG/G
OINTMENT TOPICAL ONCE
OUTPATIENT
Start: 2025-01-17 | End: 2025-01-17

## 2025-01-17 RX ORDER — CEPHALEXIN 500 MG/1
500 CAPSULE ORAL 3 TIMES DAILY
Qty: 21 CAPSULE | Refills: 0 | Status: SHIPPED | OUTPATIENT
Start: 2025-01-17 | End: 2025-01-24

## 2025-01-17 RX ORDER — DOXYCYCLINE HYCLATE 100 MG
100 TABLET ORAL 2 TIMES DAILY
Qty: 14 TABLET | Refills: 0 | Status: SHIPPED | OUTPATIENT
Start: 2025-01-17 | End: 2025-01-24

## 2025-01-17 RX ORDER — LIDOCAINE 40 MG/G
CREAM TOPICAL ONCE
OUTPATIENT
Start: 2025-01-17 | End: 2025-01-17

## 2025-01-17 RX ORDER — GINSENG 100 MG
CAPSULE ORAL ONCE
OUTPATIENT
Start: 2025-01-17 | End: 2025-01-17

## 2025-01-17 RX ORDER — MUPIROCIN 20 MG/G
OINTMENT TOPICAL ONCE
OUTPATIENT
Start: 2025-01-17 | End: 2025-01-17

## 2025-01-17 RX ORDER — GENTAMICIN SULFATE 1 MG/G
OINTMENT TOPICAL ONCE
OUTPATIENT
Start: 2025-01-17 | End: 2025-01-17

## 2025-01-17 ASSESSMENT — PAIN SCALES - GENERAL: PAINLEVEL_OUTOF10: 4

## 2025-01-17 ASSESSMENT — PAIN DESCRIPTION - LOCATION: LOCATION: ABDOMEN

## 2025-01-17 ASSESSMENT — PAIN DESCRIPTION - DESCRIPTORS: DESCRIPTORS: SORE;ACHING

## 2025-01-17 NOTE — PATIENT INSTRUCTIONS
Discharge Instructions for  [facility]  [address] [place] [address]  Telephone: [phone]     FAX [phone]    Wound Care Center Information: Should you experience any significant changes in your wound(s) or have questions about your wound care, please contact the [facility] at MONDAY - FRIDAY 8:00 am - 4:30.  If you need help with your wound outside these hours and cannot wait until we are again available, contact your PCP or go to the hospital emergency room.     NAME:  Cindy Ortega  YOB: 1975  DATE:  1/17/2025    : Munir LARRY    [] Wound and dressing supply provider:   [x] Home Healthcare:[facility]    Wound Cleansing:   Do not scrub or use excessive force.  Cleanse wound prior to applying a clean dressing with:  [] Normal Saline   [] Keep Wound Dry in Shower - may purchase a cast cover at local pharmacy     [] Cleanse wound with Mild Soap & Water    [] May Shower: remove dressing 1st, wash with mild soap and water, pat dry, and redress wound right after with a new dressing  [] Do not shower  [x] cleanse with baby shampoo lather leave 2-3 then rinse with water    Topical Treatments:  Do not apply lotions, creams, or ointments to wound bed unless directed.   [] Apply moisturizing lotion  to skin surrounding the wound prior to dressing change.  [] Other:     Dressings:                   Wound Location abdomen     Apply Primary Dressing:      [x] Cordelia Aquacel ag       Pack wound loosely with:                                                   [] Iodoform       [] Plain Packing       [] Mesalt        [] Other:HFBR    Cover and Secure with:  [x] Gauze [] ABD [] exudry     [] Kaylin [] Kerlix [] Mepilex Border  [] Ace Wrap [x] Roll Tape   [] Other: Drawtex     Change dressing:   [] Daily      [x] Every Other Day   [] Three times per week  [] Once a week   [] Do Not Change Dressing     [] Other:           []

## 2025-01-17 NOTE — PROGRESS NOTES
RN called and spoke to pt's sister, Lokesh Weiner, to give an update. 1645: RN updated family. 1740: RN informed family pt was assigned to a 4th floor bed if they would like to go up to the 4th floor waiting area until pt is transferred and settled in the room. WOUND COVERED AT ALL TIMES.     Physician Signature:_______________________  Dr. Bustamante        Electronically signed by Yani Bustamante MD on 1/17/2025 at 12:06 PM

## 2025-01-17 NOTE — FLOWSHEET NOTE
01/17/25 1137   Anesthetic   Anesthetic 4% Lidocaine Liquid Topical   Wound 09/24/24 Abdomen Mid #1   Date First Assessed/Time First Assessed: 09/24/24 0100   Present on Original Admission: Yes  Primary Wound Type: Other (comment)  Location: Abdomen  Wound Location Orientation: Mid  Wound Description (Comments): #1   Wound Image    Dressing Status Old drainage noted   Wound Cleansed Cleansed with saline   Wound Length (cm) 0.8 cm   Wound Width (cm) 0.7 cm   Wound Depth (cm) 1 cm   Wound Surface Area (cm^2) 0.56 cm^2   Change in Wound Size % (l*w) -366.67   Wound Volume (cm^3) 0.56 cm^3   Wound Healing % -1456   Wound Assessment Pink/red;Granulation tissue   Drainage Amount Moderate (25-50%)   Drainage Description Green;Yellow   Odor None   Natalia-wound Assessment Blanchable erythema   Margins Attached edges   Wound Thickness Description not for Pressure Injury Full thickness     /60   Pulse 81   Temp 98.2 °F (36.8 °C) (Temporal)   Resp 15

## 2025-01-17 NOTE — FLOWSHEET NOTE
01/17/25 1205   Wound 09/24/24 Abdomen Mid #1   Date First Assessed/Time First Assessed: 09/24/24 0100   Present on Original Admission: Yes  Primary Wound Type: Other (comment)  Location: Abdomen  Wound Location Orientation: Mid  Wound Description (Comments): #1   Dressing/Treatment Collagen with Ag;Alginate with Ag;Gauze dressing/dressing sponge;Tape/Soft cloth adhesive tape     Discharge Condition: Stable    Pain: 0    Ambulatory Status:Walking    Discharge Destination: Home    Transportation:Car    Accompanied by: Self    Discharge instructions reviewed with Self and copy or written instructions have been provided. All questions/concerns have been addressed at this time.

## 2025-01-22 ENCOUNTER — OFFICE VISIT (OUTPATIENT)
Facility: CLINIC | Age: 50
End: 2025-01-22

## 2025-01-22 VITALS
DIASTOLIC BLOOD PRESSURE: 86 MMHG | TEMPERATURE: 97.2 F | WEIGHT: 185 LBS | SYSTOLIC BLOOD PRESSURE: 128 MMHG | BODY MASS INDEX: 34.04 KG/M2 | OXYGEN SATURATION: 98 % | HEIGHT: 62 IN | RESPIRATION RATE: 14 BRPM | HEART RATE: 94 BPM

## 2025-01-22 DIAGNOSIS — K50.113 CROHN'S DISEASE OF LARGE INTESTINE WITH FISTULA (HCC): ICD-10-CM

## 2025-01-22 DIAGNOSIS — L08.9 CHRONIC ABDOMINAL WOUND INFECTION, INITIAL ENCOUNTER: ICD-10-CM

## 2025-01-22 DIAGNOSIS — M84.48XG SACRAL INSUFFICIENCY FRACTURE WITH DELAYED HEALING: ICD-10-CM

## 2025-01-22 DIAGNOSIS — Z09 HOSPITAL DISCHARGE FOLLOW-UP: Primary | ICD-10-CM

## 2025-01-22 DIAGNOSIS — E66.811 OBESITY (BMI 30.0-34.9): ICD-10-CM

## 2025-01-22 DIAGNOSIS — M80.80XG LOCALIZED OSTEOPOROSIS WITH CURRENT PATHOLOGICAL FRACTURE WITH DELAYED HEALING: ICD-10-CM

## 2025-01-22 DIAGNOSIS — S31.109A CHRONIC ABDOMINAL WOUND INFECTION, INITIAL ENCOUNTER: ICD-10-CM

## 2025-01-22 DIAGNOSIS — Z85.528 HISTORY OF KIDNEY CANCER: ICD-10-CM

## 2025-01-22 PROBLEM — E66.01 MORBID OBESITY: Status: RESOLVED | Noted: 2017-10-27 | Resolved: 2025-01-22

## 2025-01-22 PROBLEM — N28.89 RIGHT RENAL MASS: Status: RESOLVED | Noted: 2020-11-02 | Resolved: 2025-01-22

## 2025-01-22 PROBLEM — A41.9 SEPSIS (HCC): Status: RESOLVED | Noted: 2024-12-23 | Resolved: 2025-01-22

## 2025-01-22 PROBLEM — Z78.9 CENTRAL VENOUS CATHETER IN PLACE: Status: ACTIVE | Noted: 2023-10-31

## 2025-01-22 ASSESSMENT — PATIENT HEALTH QUESTIONNAIRE - PHQ9
SUM OF ALL RESPONSES TO PHQ QUESTIONS 1-9: 0
SUM OF ALL RESPONSES TO PHQ QUESTIONS 1-9: 0
1. LITTLE INTEREST OR PLEASURE IN DOING THINGS: NOT AT ALL
2. FEELING DOWN, DEPRESSED OR HOPELESS: NOT AT ALL
SUM OF ALL RESPONSES TO PHQ QUESTIONS 1-9: 0
SUM OF ALL RESPONSES TO PHQ QUESTIONS 1-9: 0
SUM OF ALL RESPONSES TO PHQ9 QUESTIONS 1 & 2: 0

## 2025-01-22 NOTE — PROGRESS NOTES
Post-Discharge Transitional Care  Follow Up      Cindy Ortega   YOB: 1975    Date of Office Visit:  1/22/2025  Date of Hospital Admission: 12/23/24  Date of Hospital Discharge: 12/25/24  Risk of hospital readmission (high >=14%. Medium >=10%) :Readmission Risk Score: 15.6      Care management risk score Rising risk (score 2-5) and Complex Care (Scores >=6): No Risk Score On File     Non face to face  following discharge, date last encounter closed (first attempt may have been earlier): *No documented post hospital discharge outreach found in the last 14 days    Call initiated 2 business days of discharge: *No response recorded in the last 14 days    ASSESSMENT/PLAN:   Hospital discharge follow-up  -     TX DISCHARGE MEDS RECONCILED W/ CURRENT OUTPATIENT MED LIST  -     External Referral To Infectious Disease  History of kidney cancer  Sacral insufficiency fracture with delayed healing  Localized osteoporosis with current pathological fracture with delayed healing  Obesity (BMI 30.0-34.9)  Crohn's disease of large intestine with fistula (HCC)  Assessment & Plan:  -Fistula present, anal leakage. Patient is on Humira, prednisone PRN. Patient reports Crohns is limited to her rectum as of now. Managed by specialists, Dr. Crane locally and Dr. Behm at [facility].   Chronic abdominal wound infection, initial encounter  -     External Referral To Infectious Disease    Medical Decision Making: high complexity    Patient has an appointment 6/25/25 for medicare annual wellness.    On this date 1/22/2025 I have spent 40 minutes reviewing previous notes, test results and face to face with the patient discussing the diagnosis and importance of compliance with the treatment plan as well as documenting on the day of the visit.       Subjective:   HPI:  Follow up of Hospital problems/diagnosis(es): See record of hospitalization below and follow up today in blue text.    HOSPITAL ADMISSION NOTE  Cindy Ortega is a

## 2025-01-22 NOTE — PROGRESS NOTES
Chief Complaint   Patient presents with    Follow-Up from Hospital     MRSA and Spesis-- [facility].     No data recorded  \"Have you been to the ER, urgent care clinic since your last visit?  Hospitalized since your last visit?\"    Patient went to [facility].  “Have you seen or consulted any other health care providers outside our system since your last visit?”    NO    Have you had a mammogram?”   YES - Where: [facility] Nurse/CMA to request most recent records if not in the chart    No breast cancer screening on file      “Have you had a pap smear?”    YES - Where: [facility] Nurse/CMA to request most recent records if not in the chart    No cervical cancer screening on file         Click Here for Release of Records Request     Wt 83.9 kg (185 lb)   BMI 33.83 kg/m²        No data to display

## 2025-01-24 ENCOUNTER — HOSPITAL ENCOUNTER (OUTPATIENT)
Facility: HOSPITAL | Age: 50
Discharge: HOME | End: 2025-01-24
Attending: STUDENT IN AN ORGANIZED HEALTH CARE EDUCATION/TRAINING PROGRAM
Payer: MEDICARE

## 2025-01-24 VITALS
TEMPERATURE: 97.7 F | HEART RATE: 90 BPM | SYSTOLIC BLOOD PRESSURE: 125 MMHG | DIASTOLIC BLOOD PRESSURE: 84 MMHG | RESPIRATION RATE: 16 BRPM

## 2025-01-24 DIAGNOSIS — L03.311 CELLULITIS OF ABDOMINAL WALL: ICD-10-CM

## 2025-01-24 DIAGNOSIS — L08.9 CHRONIC ABDOMINAL WOUND INFECTION, INITIAL ENCOUNTER: Primary | ICD-10-CM

## 2025-01-24 DIAGNOSIS — S31.109A CHRONIC ABDOMINAL WOUND INFECTION, INITIAL ENCOUNTER: Primary | ICD-10-CM

## 2025-01-24 PROCEDURE — 17250 CHEM CAUT OF GRANLTJ TISSUE: CPT

## 2025-01-24 RX ORDER — SILVER SULFADIAZINE 10 MG/G
CREAM TOPICAL ONCE
OUTPATIENT
Start: 2025-01-24 | End: 2025-01-24

## 2025-01-24 RX ORDER — TRIAMCINOLONE ACETONIDE 1 MG/G
OINTMENT TOPICAL ONCE
OUTPATIENT
Start: 2025-01-24 | End: 2025-01-24

## 2025-01-24 RX ORDER — NEOMYCIN/BACITRACIN/POLYMYXINB 3.5-400-5K
OINTMENT (GRAM) TOPICAL ONCE
OUTPATIENT
Start: 2025-01-24 | End: 2025-01-24

## 2025-01-24 RX ORDER — GENTAMICIN SULFATE 1 MG/G
OINTMENT TOPICAL ONCE
OUTPATIENT
Start: 2025-01-24 | End: 2025-01-24

## 2025-01-24 RX ORDER — LIDOCAINE HYDROCHLORIDE 20 MG/ML
JELLY TOPICAL ONCE
OUTPATIENT
Start: 2025-01-24 | End: 2025-01-24

## 2025-01-24 RX ORDER — MUPIROCIN 20 MG/G
OINTMENT TOPICAL ONCE
OUTPATIENT
Start: 2025-01-24 | End: 2025-01-24

## 2025-01-24 RX ORDER — LIDOCAINE HYDROCHLORIDE 40 MG/ML
SOLUTION TOPICAL ONCE
OUTPATIENT
Start: 2025-01-24 | End: 2025-01-24

## 2025-01-24 RX ORDER — LIDOCAINE 50 MG/G
OINTMENT TOPICAL ONCE
OUTPATIENT
Start: 2025-01-24 | End: 2025-01-24

## 2025-01-24 RX ORDER — LIDOCAINE 40 MG/G
CREAM TOPICAL ONCE
OUTPATIENT
Start: 2025-01-24 | End: 2025-01-24

## 2025-01-24 RX ORDER — CLOBETASOL PROPIONATE 0.5 MG/G
OINTMENT TOPICAL ONCE
OUTPATIENT
Start: 2025-01-24 | End: 2025-01-24

## 2025-01-24 RX ORDER — SODIUM CHLOR/HYPOCHLOROUS ACID 0.033 %
SOLUTION, IRRIGATION IRRIGATION ONCE
OUTPATIENT
Start: 2025-01-24 | End: 2025-01-24

## 2025-01-24 RX ORDER — BACITRACIN ZINC AND POLYMYXIN B SULFATE 500; 1000 [USP'U]/G; [USP'U]/G
OINTMENT TOPICAL ONCE
OUTPATIENT
Start: 2025-01-24 | End: 2025-01-24

## 2025-01-24 RX ORDER — BETAMETHASONE DIPROPIONATE 0.5 MG/G
CREAM TOPICAL ONCE
OUTPATIENT
Start: 2025-01-24 | End: 2025-01-24

## 2025-01-24 RX ORDER — GINSENG 100 MG
CAPSULE ORAL ONCE
OUTPATIENT
Start: 2025-01-24 | End: 2025-01-24

## 2025-01-24 NOTE — FLOWSHEET NOTE
01/24/25 1102   Wound 09/24/24 Abdomen Mid #1   Date First Assessed/Time First Assessed: 09/24/24 0100   Present on Original Admission: Yes  Primary Wound Type: Other (comment)  Location: Abdomen  Wound Location Orientation: Mid  Wound Description (Comments): #1   Wound Image    Wound Cleansed Cleansed with saline   Wound Length (cm) 0.3 cm   Wound Width (cm) 0.2 cm   Wound Depth (cm) 0.8 cm   Wound Surface Area (cm^2) 0.06 cm^2   Change in Wound Size % (l*w) 50   Wound Volume (cm^3) 0.048 cm^3   Wound Healing % -33   Wound Assessment Hyper granulation tissue;Pale granulation tissue   Drainage Amount Small (< 25%)   Drainage Description Yellow;Serous   Odor None   Natalia-wound Assessment Blanchable erythema   Margins Defined edges   Wound Thickness Description not for Pressure Injury Full thickness     /84   Pulse 90   Temp 97.7 °F (36.5 °C) (Temporal)   Resp 16

## 2025-01-24 NOTE — PATIENT INSTRUCTIONS
Discharge Instructions for  [facility]  [address] [place] [address]  Telephone: [phone]     FAX [phone]    Wound Care Center Information: Should you experience any significant changes in your wound(s) or have questions about your wound care, please contact the [facility] at MONDAY - FRIDAY 8:00 am - 4:30.  If you need help with your wound outside these hours and cannot wait until we are again available, contact your PCP or go to the hospital emergency room.     NAME:  Cindy Ortega  YOB: 1975  DATE:  1/24/2025    : Munir LARRY    [] Wound and dressing supply provider:   [x] Home Healthcare:[facility]    Wound Cleansing:   Do not scrub or use excessive force.  Cleanse wound prior to applying a clean dressing with:  [] Normal Saline   [] Keep Wound Dry in Shower - may purchase a cast cover at local pharmacy     [] Cleanse wound with Mild Soap & Water    [] May Shower: remove dressing 1st, wash with mild soap and water, pat dry, and redress wound right after with a new dressing  [] Do not shower  [x] cleanse with baby shampoo lather leave 2-3 then rinse with water    Topical Treatments:  Do not apply lotions, creams, or ointments to wound bed unless directed.   [] Apply moisturizing lotion  to skin surrounding the wound prior to dressing change.  [] Other:     Dressings:                   Wound Location abdomen     Apply Primary Dressing:      [x] Gauze tape       Pack wound loosely with:                                                   [] Iodoform       [] Plain Packing       [] Mesalt        [] Other:HFBR    Cover and Secure with:  [x] Gauze [] ABD [] exudry     [] Kaylin [] Kerlix [] Mepilex Border  [] Ace Wrap [x] Roll Tape   [] Other: Drawtex     Change dressing:   [x] Daily      [] Every Other Day   [] Three times per week  [] Once a week   [] Do Not Change Dressing     [] Other:           [] Elevate

## 2025-01-24 NOTE — FLOWSHEET NOTE
01/24/25 1208   Wound 09/24/24 Abdomen Mid #1   Date First Assessed/Time First Assessed: 09/24/24 0100   Present on Original Admission: Yes  Primary Wound Type: Other (comment)  Location: Abdomen  Wound Location Orientation: Mid  Wound Description (Comments): #1   Dressing/Treatment Gauze dressing/dressing sponge;Tape/Soft cloth adhesive tape     Discharge Condition: Stable    Pain: 0    Ambulatory Status: None    Discharge Destination: home    Transportation:car    Accompanied by: SELF    Discharge instructions reviewed with SELF and copy or written instructions have been provided. All questions/concerns have been addressed at this time.

## 2025-01-24 NOTE — PROGRESS NOTES
previously. Periwound is red. Not hard, warm or swollen. No fevers or chills       Comorbid conditions affecting wound healing: As noted in PMH and PSH which was reviewed.  Pertinent labs reviewed.   Review of medical records and external note (s) from other providers was done for this visit.    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  Based on the chronic nature of this I doubt this will ever heal and the only way to heal it would be surgery and the patient really is not a candidate for that.  She had a extensive small bowel resection due to involvement of Crohn's and likely what I suspect was adhesiolysis during previous surgeries.    Review of Systems   Constitutional:         See HPI   All other systems reviewed and are negative.      Objective:     Blood pressure 125/84, pulse 90, temperature 97.7 °F (36.5 °C), temperature source Temporal, resp. rate 16.    Temp (24hrs), Av.7 °F (36.5 °C), Min:97.7 °F (36.5 °C), Max:97.7 °F (36.5 °C)      Physical Exam  Vitals and nursing note reviewed. Exam conducted with a chaperone present.   Constitutional:       Appearance: Normal appearance.   HENT:      Head: Normocephalic and atraumatic.      Nose: Nose normal.   Eyes:      Conjunctiva/sclera: Conjunctivae normal.   Cardiovascular:      Rate and Rhythm: Normal rate.   Pulmonary:      Effort: Pulmonary effort is normal.   Neurological:      General: No focal deficit present.      Mental

## 2025-01-29 ASSESSMENT — ENCOUNTER SYMPTOMS: SHORTNESS OF BREATH: 0

## 2025-01-29 NOTE — ASSESSMENT & PLAN NOTE
-Fistula present, anal leakage. Patient is on Humira, prednisone PRN. Patient reports Crohns is limited to her rectum as of now. Managed by specialists, Dr. Crane locally and Dr. Behm at [facility].

## 2025-02-26 ENCOUNTER — OFFICE VISIT (OUTPATIENT)
Facility: CLINIC | Age: 50
End: 2025-02-26
Payer: MEDICARE

## 2025-02-26 VITALS
HEIGHT: 62 IN | WEIGHT: 190 LBS | HEART RATE: 79 BPM | DIASTOLIC BLOOD PRESSURE: 96 MMHG | BODY MASS INDEX: 34.96 KG/M2 | SYSTOLIC BLOOD PRESSURE: 124 MMHG | RESPIRATION RATE: 16 BRPM | OXYGEN SATURATION: 98 %

## 2025-02-26 DIAGNOSIS — N30.90 CYSTITIS: Primary | ICD-10-CM

## 2025-02-26 PROCEDURE — 99214 OFFICE O/P EST MOD 30 MIN: CPT

## 2025-02-26 RX ORDER — NITROFURANTOIN 25; 75 MG/1; MG/1
100 CAPSULE ORAL 2 TIMES DAILY
Qty: 20 CAPSULE | Refills: 0 | Status: SHIPPED | OUTPATIENT
Start: 2025-02-26 | End: 2025-03-08

## 2025-02-26 RX ORDER — FLUCONAZOLE 150 MG/1
150 TABLET ORAL ONCE
Qty: 1 TABLET | Refills: 0 | Status: SHIPPED | OUTPATIENT
Start: 2025-02-26 | End: 2025-02-26

## 2025-02-26 RX ORDER — PHENAZOPYRIDINE HYDROCHLORIDE 100 MG/1
100 TABLET, FILM COATED ORAL 3 TIMES DAILY PRN
Qty: 9 TABLET | Refills: 0 | Status: SHIPPED | OUTPATIENT
Start: 2025-02-26

## 2025-02-26 ASSESSMENT — ENCOUNTER SYMPTOMS
NAUSEA: 0
ABDOMINAL DISTENTION: 0
VOMITING: 0
BACK PAIN: 0
CONSTIPATION: 0

## 2025-02-26 NOTE — ASSESSMENT & PLAN NOTE
-Discussed differentials including interstitial cystitis, ureaplasma/mycoplasma, or vaginal infection  -Encouraged patient to increase daily water intake, discussed possible usefulness of supplements such as cranberry, vitamin C, and zinc  -Discussed benefits, risks, and side effects of prescribed medication  -Urine dip in office: small bilirubin, large amount of blood, >300 protein, positive nitrates, trace leuks  -Urine sent for culture based on symptoms  -Discussed with patient warning signs to return to the office and s/s pyelonephritis   -Advised patient if no pain control by tonight to go to the ER to rule out more acute process.  -Advised patient to alert her Urology/Oncologist Dr. Rajput about additional new symptoms

## 2025-02-26 NOTE — PROGRESS NOTES
Acute Office Visit    Subjective  Chief Complaint   Patient presents with    Urinary Tract Infection     Pain in LLQ-- steroid and morphine are not checking. Had frequency issues last night. Getting up and urinating but tried to drink a lot and pain started this morning. No hx of kidney stones had kidney cancer recent CT shows new cyst     HPI:  Cindy Ortega is a 49 y.o. female. Patient reports starting yesterday she developed urinary frequency and pelvic pain. She reports constant 10/10 pain to left bladder area. She denies blood in her pee, reports a small amount of vaginal discharge yesterday. She states she is peeing every twenty minutes. Patient has an extensive medical history including renal cancer and crohn's disease with jejunostomy and fistula. Patient did one liter of fluids at home yesterday, overnight she did 0.45% NS 2,000cc overnight.     CT 1/30/25  1. Unchanged right lower pole ablation cavity. No evidence of local recurrence or metastatic disease within the abdomen/pelvis.   2. 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   Lungs/Pleura: No suspicious pulmonary nodules. No pleural effusion or focal pleural lesion.   Axilla/Soft Tissue: No supraclavicular adenopathy. Borderline enlarged right axillary lymph node (1.0 cm), increased from priors. Regional soft tissues are within normal limits.   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 Right chest wall Mediport. No pathologic mediastinal or hilar adenopathy. The heart is normal in size. No pericardial effusion.   Bones:   Osteoporosis. Healed sternal fracture, unchanged. Compression deformities of T4, T6, T11 and T12, unchanged. No new or aggressive osseous lesions.     Specialists  Gastro- Dr. Fagan ([facility]), Dr. Behm ([facility]), Sees [facility] home[facility] weekly, gets TPN and lipids, bloodwork monthly, Visits every six months and PRN  Pain Specialists-

## 2025-02-28 ENCOUNTER — HOSPITAL ENCOUNTER (OUTPATIENT)
Facility: HOSPITAL | Age: 50
Discharge: HOME | End: 2025-02-28
Attending: STUDENT IN AN ORGANIZED HEALTH CARE EDUCATION/TRAINING PROGRAM
Payer: MEDICARE

## 2025-02-28 VITALS
HEART RATE: 90 BPM | SYSTOLIC BLOOD PRESSURE: 142 MMHG | RESPIRATION RATE: 16 BRPM | DIASTOLIC BLOOD PRESSURE: 77 MMHG | TEMPERATURE: 97.7 F

## 2025-02-28 DIAGNOSIS — S31.109A CHRONIC ABDOMINAL WOUND INFECTION, INITIAL ENCOUNTER: Primary | ICD-10-CM

## 2025-02-28 DIAGNOSIS — L08.9 CHRONIC ABDOMINAL WOUND INFECTION, INITIAL ENCOUNTER: Primary | ICD-10-CM

## 2025-02-28 DIAGNOSIS — L03.311 CELLULITIS OF ABDOMINAL WALL: ICD-10-CM

## 2025-02-28 PROCEDURE — 99212 OFFICE O/P EST SF 10 MIN: CPT

## 2025-02-28 RX ORDER — GENTAMICIN SULFATE 1 MG/G
OINTMENT TOPICAL ONCE
Status: CANCELLED | OUTPATIENT
Start: 2025-02-28 | End: 2025-02-28

## 2025-02-28 RX ORDER — SILVER SULFADIAZINE 10 MG/G
CREAM TOPICAL ONCE
Status: CANCELLED | OUTPATIENT
Start: 2025-02-28 | End: 2025-02-28

## 2025-02-28 RX ORDER — NEOMYCIN/BACITRACIN/POLYMYXINB 3.5-400-5K
OINTMENT (GRAM) TOPICAL ONCE
Status: CANCELLED | OUTPATIENT
Start: 2025-02-28 | End: 2025-02-28

## 2025-02-28 RX ORDER — SODIUM CHLOR/HYPOCHLOROUS ACID 0.033 %
SOLUTION, IRRIGATION IRRIGATION ONCE
Status: CANCELLED | OUTPATIENT
Start: 2025-02-28 | End: 2025-02-28

## 2025-02-28 RX ORDER — BETAMETHASONE DIPROPIONATE 0.5 MG/G
CREAM TOPICAL ONCE
Status: CANCELLED | OUTPATIENT
Start: 2025-02-28 | End: 2025-02-28

## 2025-02-28 RX ORDER — LIDOCAINE 50 MG/G
OINTMENT TOPICAL ONCE
Status: CANCELLED | OUTPATIENT
Start: 2025-02-28 | End: 2025-02-28

## 2025-02-28 RX ORDER — GINSENG 100 MG
CAPSULE ORAL ONCE
Status: CANCELLED | OUTPATIENT
Start: 2025-02-28 | End: 2025-02-28

## 2025-02-28 RX ORDER — LIDOCAINE HYDROCHLORIDE 40 MG/ML
SOLUTION TOPICAL ONCE
Status: CANCELLED | OUTPATIENT
Start: 2025-02-28 | End: 2025-02-28

## 2025-02-28 RX ORDER — BACITRACIN ZINC AND POLYMYXIN B SULFATE 500; 1000 [USP'U]/G; [USP'U]/G
OINTMENT TOPICAL ONCE
Status: CANCELLED | OUTPATIENT
Start: 2025-02-28 | End: 2025-02-28

## 2025-02-28 RX ORDER — LIDOCAINE HYDROCHLORIDE 20 MG/ML
JELLY TOPICAL ONCE
Status: CANCELLED | OUTPATIENT
Start: 2025-02-28 | End: 2025-02-28

## 2025-02-28 RX ORDER — TRIAMCINOLONE ACETONIDE 1 MG/G
OINTMENT TOPICAL ONCE
Status: CANCELLED | OUTPATIENT
Start: 2025-02-28 | End: 2025-02-28

## 2025-02-28 RX ORDER — CLOBETASOL PROPIONATE 0.5 MG/G
OINTMENT TOPICAL ONCE
Status: CANCELLED | OUTPATIENT
Start: 2025-02-28 | End: 2025-02-28

## 2025-02-28 RX ORDER — MUPIROCIN 20 MG/G
OINTMENT TOPICAL ONCE
Status: CANCELLED | OUTPATIENT
Start: 2025-02-28 | End: 2025-02-28

## 2025-02-28 RX ORDER — LIDOCAINE 40 MG/G
CREAM TOPICAL ONCE
Status: CANCELLED | OUTPATIENT
Start: 2025-02-28 | End: 2025-02-28

## 2025-02-28 ASSESSMENT — PAIN DESCRIPTION - LOCATION: LOCATION: FOOT

## 2025-02-28 ASSESSMENT — PAIN SCALES - GENERAL: PAINLEVEL_OUTOF10: 6

## 2025-02-28 ASSESSMENT — PAIN DESCRIPTION - ORIENTATION: ORIENTATION: LEFT

## 2025-02-28 NOTE — PROGRESS NOTES
[facility]     Note Type: History and Physical    Referring Provider: Yani Bustamante MD  Reason for Referral: abdominal wall cellulitis and abscess recurrent and chronic    Cindy Ortega  MEDICAL RECORD NUMBER:  [id number]  AGE: 49 y.o.   GENDER: female  : 1975  EPISODE DATE:  2025    Chief complaint and reason for visit:     Chief Complaint   Patient presents with    Wound Check     abdomen        HPI/Wound Narrative:      Cindy Ortega is a 49 y.o. female who presents today for an evaluation of a wound/ulcer. Wound duration: since (date) intermittently since  (most recently since 9/3/27) .    H and P:  She has a h/o Crohn's disease / S/P total colectomy / Jejunostomy present / On total parenteral nutrition (TPN)  She was admitted on my service at [facility] and received 3 days of IV vanc. BCx NG. Wcx grew MRSA. Sensitivities resulted since discharge. Only PO options: linezolid (interacts with other meds), bactrim (sulfa allergy) and doxy (intermediate). Patient was discharged on doxy after significant clinical improvement.    Follow up 10/18/24: Some drainage (nonpurulent. No odor). No pain. Some chills but no recorded fevers    Follow up 10/25/24: Some drainage, though much less (nonpurulent. No odor). No pain. Some chills but no recorded fevers    Follow up 24: Some drainage, though much less (nonpurulent. No odor). No pain. Some chills but no recorded fevers    Follow up 24: Some drainage. No pain. No chills or fever. Has a hard lump adjacent to wound    Follow up 25: Some drainage. No pain. Developed cellulitis after the periwound became infected. Patient was admitted to hospital and discharged on PO abx which she has completed. 3 days ago she began having redness of periwound again    Follow up 25: Some drainage. No pain. Cellulitis resolved.    Medical Decision Making / Treatment Plan:     Historian(s): patient .     Non pressure

## 2025-02-28 NOTE — PATIENT INSTRUCTIONS
Discharge Instructions for  [facility]  [address] [place] [address]  Telephone: [phone]     FAX [phone]    Wound Care Center Information: Should you experience any significant changes in your wound(s) or have questions about your wound care, please contact the [facility] at MONDAY - FRIDAY 8:00 am - 4:30.  If you need help with your wound outside these hours and cannot wait until we are again available, contact your PCP or go to the hospital emergency room.     NAME:  Cindy Ortega  YOB: 1975  DATE:  2/28/2025    : Munir Tobar and tape    cWTC THANK YOU      Your treatment has been completed at [facility] outpatient wound clinic on 2/28/2025  , per Dr. Bustamante     We know patients have several options when choosing a health care provider. We would like to express our sincere appreciation for having had the chance to be yours. More importantly, we enjoy having you as part of our family.     We also hope your experience with us has surpassed your expectations and that you have been pleased with our service. We appreciate the confidence you've shown in selecting us as your health care provider and we will continue or commitment to provide the highest quality of service.      Again, thank you for choosing us for your health care needs. If you have any further questions or concerns, please call [phone]. It has been our pleasure serving you and we hope to continue our relationship in the future, if the need occurs.       Sincerely,    [facility] Team   Physician Signature:_______________________  Dr. Bustamante

## 2025-02-28 NOTE — FLOWSHEET NOTE
02/28/25 1133   [REMOVED] Wound 09/24/24 Abdomen Mid #1   Final Assessment Date/Final Assessment Time: 02/28/25 1122  Date First Assessed/Time First Assessed: 09/24/24 0100   Present on Original Admission: Yes  Primary Wound Type: Other (comment)  Location: Abdomen  Wound Location Orientation: Mid  Wound Jem...   Dressing Status New dressing applied   Dressing/Treatment Gauze dressing/dressing sponge;Tape/Soft cloth adhesive tape     Discharge Condition: Stable    Pain: 0    Ambulatory Status:Walking    Discharge Destination: Home    Transportation:Car    Accompanied by: Self    Discharge instructions reviewed with Self and copy or written instructions have been provided. All questions/concerns have been addressed at this time.

## 2025-02-28 NOTE — FLOWSHEET NOTE
02/28/25 1117   Anesthetic   Anesthetic 4% Lidocaine Liquid Topical   Wound 09/24/24 Abdomen Mid #1   Date First Assessed/Time First Assessed: 09/24/24 0100   Present on Original Admission: Yes  Primary Wound Type: Other (comment)  Location: Abdomen  Wound Location Orientation: Mid  Wound Description (Comments): #1   Wound Image    Wound Cleansed Cleansed with saline   Wound Length (cm) 0.2 cm   Wound Width (cm) 0.3 cm   Wound Depth (cm) 0.4 cm   Wound Surface Area (cm^2) 0.06 cm^2   Change in Wound Size % (l*w) 50   Wound Volume (cm^3) 0.024 cm^3   Wound Healing % 33   Wound Assessment Pink/red   Drainage Amount Small (< 25%)   Drainage Description Yellow   Odor Mild   Natalia-wound Assessment Induration;Blanchable erythema   Margins Epibole (rolled edges)   Wound Thickness Description not for Pressure Injury Full thickness     BP (!) 142/77   Pulse 90   Temp 97.7 °F (36.5 °C) (Temporal)   Resp 16

## 2025-03-01 LAB
BACTERIA UR CULT: ABNORMAL
C TRACH RRNA SPEC QL NAA+PROBE: NEGATIVE
N GONORRHOEA RRNA SPEC QL NAA+PROBE: NEGATIVE
T VAGINALIS RRNA SPEC QL NAA+PROBE: NEGATIVE

## 2025-03-14 ENCOUNTER — TELEMEDICINE (OUTPATIENT)
Facility: CLINIC | Age: 50
End: 2025-03-14

## 2025-03-14 DIAGNOSIS — G40.909 SEIZURE DISORDER (HCC): ICD-10-CM

## 2025-03-14 DIAGNOSIS — R03.0 ELEVATED BLOOD PRESSURE READING: Primary | ICD-10-CM

## 2025-03-14 DIAGNOSIS — Z93.4 JEJUNOSTOMY PRESENT (HCC): ICD-10-CM

## 2025-03-14 DIAGNOSIS — K50.113 CROHN'S DISEASE OF LARGE INTESTINE WITH FISTULA (HCC): ICD-10-CM

## 2025-03-14 RX ORDER — PROPRANOLOL HYDROCHLORIDE 10 MG/1
TABLET ORAL
Qty: 90 TABLET | Refills: 3 | Status: SHIPPED | OUTPATIENT
Start: 2025-03-14

## 2025-03-14 ASSESSMENT — ENCOUNTER SYMPTOMS: COUGH: 1

## 2025-03-14 NOTE — ASSESSMENT & PLAN NOTE
-Discussed blood pressure goals for patient  -Discussed warning signs to go to the ER including chest pain, shortness of breath, or sudden fatigue/weakness.  -Discussed S/S hypotension  -Advised to discontinue midodrine, initiate PRN propranolol. Discussed risks and benefits.   -Advised patient if she is still feeling symptoms after starting propranolol to contact the office and we will order a holtor monitor.       Orders:    propranolol (INDERAL) 10 MG tablet; Take 1-2 tabs as needed for anxiety up to 3 times daily

## 2025-03-14 NOTE — PROGRESS NOTES
[facility]aley [facility]rera Vitals Questionnaire       Question 3/14/2025  6:37 AM EDT - Filed by Patient    What is your height? 5'2\"    What is your weight in pounds? 189    What is your top number blood pressure reading? 124    What is your bottom number blood pressure reading? 72    What is your pulse? 88    What is your temperature in Fahrenheit?  97.4    If you have a pulse oximeter, enter the reading here: No pulseox OSMANI    Patients with chronic lung disease who have a Peak Flow meter, please enter the reading here: N/A    If you are having periods, please enter the date of your last menstrual period here: Menopause            \"Have you been to the ER, urgent care clinic since your last visit?  Hospitalized since your last visit?\"    NO    “Have you seen or consulted any other health care providers outside our system since your last visit?”    NO    Have you had a mammogram?”   NO    No breast cancer screening on file      “Have you had a pap smear?”    NO    No cervical cancer screening on file

## 2025-03-14 NOTE — ASSESSMENT & PLAN NOTE
-Fistula present, anal leakage. Patient is on Humira, prednisone PRN. Patient reports Crohns is limited to her rectum as of now. Managed by specialists, Dr. Crane locally and Dr. Behm at [facility].

## 2025-03-14 NOTE — PROGRESS NOTES
Cindy Ortega, was evaluated through a synchronous (real-time) audio-video encounter. The patient (or guardian if applicable) is aware that this is a billable service, which includes applicable co-pays. This Virtual Visit was conducted with patient's (and/or legal guardian's) consent. Patient identification was verified, and a caregiver was present when appropriate.   The patient was located at Home: [address] [facility] [id number]  Provider was located at Facility (Appt Dept): [address] [facility], [address] [address]  Confirm you are appropriately licensed, registered, or certified to deliver care in the state where the patient is located as indicated above. If you are not or unsure, please re-schedule the visit: Yes, I confirm.     Cindy Ortega (:  1975) is a Established patient, presenting virtually for evaluation of the following:      Below is the assessment and plan developed based on review of pertinent history, physical exam, labs, studies, and medications.     Assessment & Plan  Elevated blood pressure reading    -Discussed blood pressure goals for patient  -Discussed warning signs to go to the ER including chest pain, shortness of breath, or sudden fatigue/weakness.  -Discussed S/S hypotension  -Advised to discontinue midodrine, initiate PRN propranolol. Discussed risks and benefits.   -Advised patient if she is still feeling symptoms after starting propranolol to contact the office and we will order a holtor monitor.       Orders:    propranolol (INDERAL) 10 MG tablet; Take 1-2 tabs as needed for anxiety up to 3 times daily    Crohn's disease of large intestine with fistula (HCC)   -Fistula present, anal leakage. Patient is on Humira, prednisone PRN. Patient reports Crohns is limited to her rectum as of now. Managed by specialists, Dr. Crane locally and Dr. Behm at [facility].          Jejunostomy present (HCC)   Monitored by specialist- no acute findings meriting

## 2025-03-21 DIAGNOSIS — N30.90 CYSTITIS: Primary | ICD-10-CM

## 2025-03-21 RX ORDER — NITROFURANTOIN 25; 75 MG/1; MG/1
100 CAPSULE ORAL 2 TIMES DAILY
Qty: 28 CAPSULE | Refills: 0 | Status: SHIPPED | OUTPATIENT
Start: 2025-03-21 | End: 2025-04-04

## 2025-03-24 DIAGNOSIS — R03.0 ELEVATED BLOOD PRESSURE READING: ICD-10-CM

## 2025-03-24 DIAGNOSIS — K63.2 ENTEROCUTANEOUS FISTULA: Primary | ICD-10-CM

## 2025-03-24 DIAGNOSIS — N30.90 CYSTITIS: ICD-10-CM

## 2025-03-24 RX ORDER — FLUCONAZOLE 150 MG/1
TABLET ORAL
Qty: 1 TABLET | Refills: 0 | Status: SHIPPED | OUTPATIENT
Start: 2025-03-24

## 2025-03-24 RX ORDER — DOXYCYCLINE HYCLATE 100 MG
100 TABLET ORAL 2 TIMES DAILY
Qty: 14 TABLET | Refills: 0 | Status: SHIPPED | OUTPATIENT
Start: 2025-03-24 | End: 2025-03-31

## 2025-03-27 RX ORDER — PROPRANOLOL HYDROCHLORIDE 10 MG/1
TABLET ORAL
Qty: 600 TABLET | Refills: 3 | OUTPATIENT
Start: 2025-03-27

## 2025-03-27 RX ORDER — CLINDAMYCIN HYDROCHLORIDE 150 MG/1
CAPSULE ORAL
Qty: 63 CAPSULE | OUTPATIENT
Start: 2025-03-27

## 2025-03-28 RX ORDER — CEPHALEXIN 500 MG/1
CAPSULE ORAL
Qty: 21 CAPSULE | Refills: 0 | OUTPATIENT
Start: 2025-03-28

## 2025-04-04 DIAGNOSIS — L03.311 CELLULITIS OF ABDOMINAL WALL: ICD-10-CM

## 2025-04-04 RX ORDER — DOXYCYCLINE HYCLATE 100 MG
100 TABLET ORAL 2 TIMES DAILY
Qty: 14 TABLET | Refills: 0 | Status: SHIPPED | OUTPATIENT
Start: 2025-04-04 | End: 2025-04-11

## 2025-04-04 RX ORDER — CEPHALEXIN 500 MG/1
500 CAPSULE ORAL 3 TIMES DAILY
Qty: 21 CAPSULE | Refills: 0 | Status: SHIPPED | OUTPATIENT
Start: 2025-04-04 | End: 2025-04-11

## 2025-04-14 RX ORDER — LORATADINE 10 MG/1
TABLET ORAL
Qty: 90 TABLET | Refills: 3 | Status: SHIPPED | OUTPATIENT
Start: 2025-04-14

## 2025-05-05 DIAGNOSIS — R03.0 ELEVATED BLOOD PRESSURE READING: ICD-10-CM

## 2025-05-05 RX ORDER — PROPRANOLOL HYDROCHLORIDE 10 MG/1
TABLET ORAL
Qty: 90 TABLET | Refills: 3 | Status: SHIPPED | OUTPATIENT
Start: 2025-05-05

## 2025-05-05 NOTE — TELEPHONE ENCOUNTER
Future Appointments  5/5/2025 - 5/5/2027   Date Visit Type Department Provider    6/18/2025 10:00 AM MEDICARE ANNUAL WELL VISIT [facility] Ani Loaiza APRN - CNP   Appointment Notes:   *Needs A1c*, Return in about 9 months (around 6/3/2025), or if symptoms worsen or fail to improve, for wellness, fasting labs, follow up, chronic conditions/meds with [facility].           1/26/2026  2:00 PM PHYSICAL [facility] Ani Loaiza APRN - CNP   Appointment Notes:   Return in about 1 year (around 1/22/2026) for Annual Exam

## 2025-05-12 ENCOUNTER — PATIENT MESSAGE (OUTPATIENT)
Facility: CLINIC | Age: 50
End: 2025-05-12

## 2025-05-12 DIAGNOSIS — R03.0 ELEVATED BLOOD PRESSURE READING: ICD-10-CM

## 2025-05-12 RX ORDER — PROPRANOLOL HYDROCHLORIDE 10 MG/1
TABLET ORAL
Qty: 270 TABLET | OUTPATIENT
Start: 2025-05-12

## 2025-05-12 NOTE — TELEPHONE ENCOUNTER
Spoke with the patient and got her scheduled for a hospital follow up with NP LAN on 05/22 at 9:30. She is also aware that if it get worse that's he will go to the emergency room.

## 2025-05-22 ENCOUNTER — OFFICE VISIT (OUTPATIENT)
Facility: CLINIC | Age: 50
End: 2025-05-22
Payer: MEDICARE

## 2025-05-22 VITALS
RESPIRATION RATE: 16 BRPM | OXYGEN SATURATION: 100 % | SYSTOLIC BLOOD PRESSURE: 120 MMHG | DIASTOLIC BLOOD PRESSURE: 78 MMHG | HEIGHT: 62 IN | WEIGHT: 188 LBS | HEART RATE: 83 BPM | BODY MASS INDEX: 34.6 KG/M2 | TEMPERATURE: 97.3 F

## 2025-05-22 DIAGNOSIS — K50.113 CROHN'S DISEASE OF LARGE INTESTINE WITH FISTULA (HCC): Primary | ICD-10-CM

## 2025-05-22 DIAGNOSIS — J45.20 MILD INTERMITTENT EXTRINSIC ASTHMA WITHOUT COMPLICATION: ICD-10-CM

## 2025-05-22 DIAGNOSIS — E13.9 SECONDARY DIABETES (HCC): ICD-10-CM

## 2025-05-22 PROBLEM — S31.109A CHRONIC ABDOMINAL WOUND INFECTION, INITIAL ENCOUNTER: Status: RESOLVED | Noted: 2017-10-16 | Resolved: 2025-05-22

## 2025-05-22 PROBLEM — R03.0 ELEVATED BLOOD PRESSURE READING: Status: RESOLVED | Noted: 2025-03-14 | Resolved: 2025-05-22

## 2025-05-22 PROBLEM — L08.9 CHRONIC ABDOMINAL WOUND INFECTION, INITIAL ENCOUNTER: Status: RESOLVED | Noted: 2017-10-16 | Resolved: 2025-05-22

## 2025-05-22 PROBLEM — N30.90 CYSTITIS: Status: RESOLVED | Noted: 2025-02-26 | Resolved: 2025-05-22

## 2025-05-22 PROBLEM — J45.909 EXTRINSIC ASTHMA: Status: ACTIVE | Noted: 2025-05-22

## 2025-05-22 LAB — HBA1C MFR BLD: 4.7 %

## 2025-05-22 PROCEDURE — G8417 CALC BMI ABV UP PARAM F/U: HCPCS

## 2025-05-22 PROCEDURE — 99214 OFFICE O/P EST MOD 30 MIN: CPT

## 2025-05-22 PROCEDURE — G8427 DOCREV CUR MEDS BY ELIG CLIN: HCPCS

## 2025-05-22 PROCEDURE — 3046F HEMOGLOBIN A1C LEVEL >9.0%: CPT

## 2025-05-22 PROCEDURE — 83036 HEMOGLOBIN GLYCOSYLATED A1C: CPT

## 2025-05-22 PROCEDURE — 1036F TOBACCO NON-USER: CPT

## 2025-05-22 RX ORDER — ALBUTEROL SULFATE 90 UG/1
2 INHALANT RESPIRATORY (INHALATION) 4 TIMES DAILY PRN
Qty: 54 G | Refills: 1 | Status: SHIPPED | OUTPATIENT
Start: 2025-05-22 | End: 2025-05-22

## 2025-05-22 RX ORDER — ALBUTEROL SULFATE 90 UG/1
2 INHALANT RESPIRATORY (INHALATION) 4 TIMES DAILY PRN
Qty: 1 EACH | Refills: 3 | Status: SHIPPED | OUTPATIENT
Start: 2025-05-22

## 2025-05-22 ASSESSMENT — ENCOUNTER SYMPTOMS: COUGH: 0

## 2025-05-22 NOTE — PROGRESS NOTES
Hospital Follow- Up    Subjective  Chief Complaint   Patient presents with    Follow-Up from Hospital     Sepsis- got a new port and got wound left AMA  left in 4- 10th Would is next to her fistula would on abdomen     HPI:  Cindy Ortega is a 49 y.o. female.  She presents for follow-up on hospitalization.    Summary of ER note from [facility] 04/26/25:  49-year-old female with a past history of Crohn's disease status post ileostomy, enterocutaneous fistula, history of jejunostomy, anemia, chronic pain followed for pain management presents for evaluation of abdominal pain.  Additional medical history of fibromyalgia, lupus, degenerative disc of L4-L5, Lyme disease.  Additional surgical history of multiple laparoscopies with colon resection, ex lap following small bowel perforation in June 2017.  Patient reports she has a fistula forming her old ilio colostomy site.  She states she has been followed by wound management and over the past 2 weeks she has been on Augmentin as well as doxycycline.  She reports overall she thought it was getting better however in the past 4 days the pain swelling and redness has increased significantly.  She states that she takes morphine 15 mg instant release twice a day and morphine 30 mg extended release up to 4 times a day and this has not been helping her pain at all.  She also states this morning she woke up she felt as if she was having chest pain she is denying any shortness of breath patient was very tearful during examination.  Patient was determined to be negative for sepsis, afebrile and negative for SIRS criteria at that time.    In the emergency room EKG was found to be normal and troponin was normal as well.  Lactic acid was normal.  Urine showed small leuks, 1-5 urine WBCs, trace protein, trace blood.  Was not sent for culture.  CT of the abdomen pelvis with IV contrast showed findings in keeping with enterocolic fistula, bowel wall thickening of Samuel's pouch,

## 2025-05-22 NOTE — ASSESSMENT & PLAN NOTE
Due to blocked pancreatic duct previously, BG now well controlled, will recheck A1C and consider resolved.

## 2025-05-22 NOTE — PROGRESS NOTES
The patient, Cindy Ortega, identity was verified by name and MRN.  Chief Complaint   Patient presents with    Follow-Up from Hospital     Sepsis- got a new port and got wound left AMA  left in 4- 10th Would is next to her fistula would on abdomen     No LMP recorded. Patient is postmenopausal.  Pulse 83   Resp (!) 100   Wt 85.3 kg (188 lb)   BMI 34.37 kg/m²        No data to display              No data recorded  \"Have you been to the ER, urgent care clinic since your last visit?  Hospitalized since your last visit?\"    NO    “Have you seen or consulted any other health care providers outside our system since your last visit?”    NO             Social History     Substance and Sexual Activity   Sexual Activity Yes    Partners: Female, Male    Birth control/protection: Abstinence, None    Comment: In a committed relationship     Medication list reviewed and active medications noted. Patient is taking medications as directed.  See documentation in medication activity.  Allergies: allergy list reviewed, no new allergies added        No questionnaires available.                           Tianna Melo LPN

## 2025-05-22 NOTE — ASSESSMENT & PLAN NOTE
Patient is awaiting surgical intervention and guidance from specialist team at [facility].  See assessment section for picture of wound.  She states it is looking much better.  Culture sent of a small spot with purulent pocket, anticipating that this will be positive but given high amount of antibiotics recently will likely treat with additional antibiotics only if decline in assessment findings/clinical picture.

## 2025-05-23 LAB
ALBUMIN/CREAT UR: 27 MG/G CREAT (ref 0–29)
CREAT UR-MCNC: 26.6 MG/DL
MICROALBUMIN UR-MCNC: 7.1 UG/ML

## 2025-05-25 ENCOUNTER — RESULTS FOLLOW-UP (OUTPATIENT)
Facility: CLINIC | Age: 50
End: 2025-05-25

## 2025-05-25 LAB — BACTERIA SPEC AEROBE CULT: ABNORMAL

## 2025-05-28 LAB
BACTERIA SPEC AEROBE CULT: ABNORMAL
BACTERIA SPEC ANAEROBE CULT: ABNORMAL

## 2025-06-06 SDOH — HEALTH STABILITY: PHYSICAL HEALTH: ON AVERAGE, HOW MANY MINUTES DO YOU ENGAGE IN EXERCISE AT THIS LEVEL?: 40 MIN

## 2025-06-06 SDOH — HEALTH STABILITY: PHYSICAL HEALTH: ON AVERAGE, HOW MANY DAYS PER WEEK DO YOU ENGAGE IN MODERATE TO STRENUOUS EXERCISE (LIKE A BRISK WALK)?: 3 DAYS

## 2025-06-06 ASSESSMENT — LIFESTYLE VARIABLES
HOW MANY STANDARD DRINKS CONTAINING ALCOHOL DO YOU HAVE ON A TYPICAL DAY: 1
HOW OFTEN DO YOU HAVE SIX OR MORE DRINKS ON ONE OCCASION: 1
HOW MANY STANDARD DRINKS CONTAINING ALCOHOL DO YOU HAVE ON A TYPICAL DAY: 1 OR 2
HOW OFTEN DO YOU HAVE A DRINK CONTAINING ALCOHOL: 3
HOW OFTEN DO YOU HAVE A DRINK CONTAINING ALCOHOL: 2-4 TIMES A MONTH

## 2025-06-06 ASSESSMENT — PATIENT HEALTH QUESTIONNAIRE - PHQ9
SUM OF ALL RESPONSES TO PHQ QUESTIONS 1-9: 1
SUM OF ALL RESPONSES TO PHQ QUESTIONS 1-9: 1
1. LITTLE INTEREST OR PLEASURE IN DOING THINGS: SEVERAL DAYS
SUM OF ALL RESPONSES TO PHQ QUESTIONS 1-9: 1
2. FEELING DOWN, DEPRESSED OR HOPELESS: NOT AT ALL
SUM OF ALL RESPONSES TO PHQ QUESTIONS 1-9: 1

## 2025-06-12 DIAGNOSIS — N30.90 CYSTITIS: ICD-10-CM

## 2025-06-12 RX ORDER — FLUCONAZOLE 150 MG/1
150 TABLET ORAL
Qty: 1 TABLET | Refills: 0 | Status: CANCELLED | OUTPATIENT
Start: 2025-06-12

## 2025-06-12 RX ORDER — NITROFURANTOIN 25; 75 MG/1; MG/1
100 CAPSULE ORAL 2 TIMES DAILY
Qty: 28 CAPSULE | Refills: 0 | Status: CANCELLED | OUTPATIENT
Start: 2025-06-12 | End: 2025-06-26

## 2025-06-18 ENCOUNTER — OFFICE VISIT (OUTPATIENT)
Facility: CLINIC | Age: 50
End: 2025-06-18
Payer: MEDICARE

## 2025-06-18 VITALS
SYSTOLIC BLOOD PRESSURE: 130 MMHG | TEMPERATURE: 97.3 F | RESPIRATION RATE: 16 BRPM | HEART RATE: 63 BPM | WEIGHT: 193 LBS | HEIGHT: 62 IN | OXYGEN SATURATION: 95 % | DIASTOLIC BLOOD PRESSURE: 86 MMHG | BODY MASS INDEX: 35.51 KG/M2

## 2025-06-18 DIAGNOSIS — J06.9 VIRAL URI: ICD-10-CM

## 2025-06-18 DIAGNOSIS — N30.90 CYSTITIS: ICD-10-CM

## 2025-06-18 DIAGNOSIS — K63.2 ENTEROCUTANEOUS FISTULA: ICD-10-CM

## 2025-06-18 DIAGNOSIS — B35.1 MYCOTIC TOENAILS: ICD-10-CM

## 2025-06-18 DIAGNOSIS — Z00.00 INITIAL MEDICARE ANNUAL WELLNESS VISIT: Primary | ICD-10-CM

## 2025-06-18 DIAGNOSIS — Z13.220 SCREENING FOR CHOLESTEROL LEVEL: ICD-10-CM

## 2025-06-18 DIAGNOSIS — Z12.31 ENCOUNTER FOR SCREENING MAMMOGRAM FOR MALIGNANT NEOPLASM OF BREAST: ICD-10-CM

## 2025-06-18 LAB
BILIRUBIN, URINE, POC: ABNORMAL
BLOOD URINE, POC: ABNORMAL
EXP DATE SOLUTION: NORMAL
EXP DATE SWAB: NORMAL
EXPIRATION DATE: NORMAL
GLUCOSE URINE, POC: 100
INFLUENZA A ANTIGEN, POC: NEGATIVE
INFLUENZA B ANTIGEN, POC: NEGATIVE
KETONES, URINE, POC: ABNORMAL
LEUKOCYTE ESTERASE, URINE, POC: NEGATIVE
LOT NUMBER POC: NORMAL
LOT NUMBER SOLUTION: NORMAL
LOT NUMBER SWAB: NORMAL
NITRITE, URINE, POC: POSITIVE
PH, URINE, POC: ABNORMAL (ref 4.6–8)
PROTEIN,URINE, POC: 30
SARS-COV-2 RNA, POC: NEGATIVE
SPECIFIC GRAVITY, URINE, POC: 1 (ref 1–1.03)
URINALYSIS CLARITY, POC: CLEAR
URINALYSIS COLOR, POC: ABNORMAL
UROBILINOGEN, POC: ABNORMAL
VALID INTERNAL CONTROL, POC: YES

## 2025-06-18 PROCEDURE — 87502 INFLUENZA DNA AMP PROBE: CPT

## 2025-06-18 PROCEDURE — G0438 PPPS, INITIAL VISIT: HCPCS

## 2025-06-18 PROCEDURE — 87635 SARS-COV-2 COVID-19 AMP PRB: CPT

## 2025-06-18 PROCEDURE — 99214 OFFICE O/P EST MOD 30 MIN: CPT

## 2025-06-18 PROCEDURE — 81003 URINALYSIS AUTO W/O SCOPE: CPT

## 2025-06-18 PROCEDURE — G8417 CALC BMI ABV UP PARAM F/U: HCPCS

## 2025-06-18 PROCEDURE — G8427 DOCREV CUR MEDS BY ELIG CLIN: HCPCS

## 2025-06-18 PROCEDURE — 1036F TOBACCO NON-USER: CPT

## 2025-06-18 RX ORDER — PHENAZOPYRIDINE HYDROCHLORIDE 100 MG/1
100 TABLET, FILM COATED ORAL 3 TIMES DAILY PRN
Qty: 10 TABLET | Refills: 0 | Status: SHIPPED | OUTPATIENT
Start: 2025-06-18 | End: 2026-06-18

## 2025-06-18 RX ORDER — FLUCONAZOLE 150 MG/1
TABLET ORAL
Qty: 2 TABLET | Refills: 0 | Status: SHIPPED | OUTPATIENT
Start: 2025-06-18

## 2025-06-18 RX ORDER — CICLOPIROX 80 MG/ML
SOLUTION TOPICAL
Qty: 6 ML | Refills: 5 | Status: SHIPPED | OUTPATIENT
Start: 2025-06-18

## 2025-06-18 RX ORDER — GUAIFENESIN 600 MG/1
1200 TABLET, EXTENDED RELEASE ORAL 2 TIMES DAILY
Qty: 40 TABLET | Refills: 0 | Status: SHIPPED | OUTPATIENT
Start: 2025-06-18 | End: 2025-06-28

## 2025-06-18 ASSESSMENT — ENCOUNTER SYMPTOMS
SHORTNESS OF BREATH: 0
WHEEZING: 0
CHEST TIGHTNESS: 0
ABDOMINAL PAIN: 0

## 2025-06-18 NOTE — PROGRESS NOTES
Medicare Annual Wellness Visit    Cindy Ortega is here for Medicare AWV (Fistula infection?), Urinary Tract Infection, and Chest Congestion (Cough, productive )    Assessment & Plan   Initial Medicare annual wellness visit  Enterocutaneous fistula  -     External Referral To Infectious Disease  Cystitis  Assessment & Plan:  -Discussed differentials including interstitial cystitis, ureaplasma/mycoplasma, or vaginal infection  -Encouraged patient to increase daily water intake, discussed possible usefulness of supplements such as cranberry, vitamin C, and zinc  -Discussed benefits, risks, and side effects of prescribed medication  -Urine dip in office: glucose, trace blood, protein, negative leuks, positive nitrates  -Urine sent for culture based on symptoms  -Discussed with patient warning signs to return to the office and s/s pyelonephritis   Orders:  -     phenazopyridine (PYRIDIUM) 100 MG tablet; Take 1 tablet by mouth 3 times daily as needed for Pain, Disp-10 tablet, R-0Normal  -     fluconazole (DIFLUCAN) 150 MG tablet; Take 1 tablet today.  Repeat dose 72 hours later if needed., Disp-2 tablet, R-0Normal  -     amoxicillin-clavulanate (AUGMENTIN) 875-125 MG per tablet; Take 1 tablet by mouth 2 times daily for 10 days, Disp-20 tablet, R-0Normal  -     Culture, Urine  Viral URI  Assessment & Plan:  -Provided patient with list of OTC medications to take for symptom management  -Augmentin prescribed for UTI.  -Discussed increasing daily fluid intake  -Advised patient to continue wearing a mask around other people while symptomatic and encouraged good hand hygiene   -Advised patient to contact the office if no improvement in symptoms by day ten of symptoms  - Patient does have her albuterol inhaler with refills.  Orders:  -     guaiFENesin (MUCINEX) 600 MG extended release tablet; Take 2 tablets by mouth 2 times daily for 10 days, Disp-40 tablet, R-0Normal  -     AMB POC COVID-19 COV  -     AMB POC INFLUENZA A

## 2025-06-18 NOTE — PROGRESS NOTES
The patient, Cindy Ortega, identity was verified by name and MRN.  Chief Complaint   Patient presents with    Medicare AWV     Fistula infection?    Urinary Tract Infection    Chest Congestion     Cough, productive      No LMP recorded. Patient is postmenopausal.  Wt 87.5 kg (193 lb)   BMI 35.29 kg/m²       6/6/2025     7:27 AM   Amb Fall Risk Assessment and TUG Test   Do you feel unsteady or are you worried about falling?  yes   2 or more falls in past year? yes   Fall with injury in past year? no     No data recorded  \"Have you been to the ER, urgent care clinic since your last visit?  Hospitalized since your last visit?\"    NO    “Have you seen or consulted any other health care providers outside our system since your last visit?”    NO      “Have you had a diabetic eye exam?”    NO     No diabetic eye exam on file            Social History     Substance and Sexual Activity   Sexual Activity Yes    Partners: Female, Male    Birth control/protection: Abstinence, None    Comment: In a committed relationship     Medication list reviewed and active medications noted. Patient is taking medications as directed.  See documentation in medication activity.  Allergies: allergy list reviewed, no new allergies added    Medicare Awv Health Risk Assessment / Depression Screen       Question 6/6/2025  7:27 AM EDT - Filed by Patient    Fall Risk Screening     Do you feel unsteady or are you worried about falling? yes    Have you fallen 2 or more times in the past year?   yes    Have you had any fall with injury in the past year?   no    Health Risk Assessment / General     In general, how would you say your health is? Good    In the past 7 days, have you experienced any of the following: New or Increased Pain, New or Increased Fatigue, Loneliness, Social Isolation, Stress or Anger? Yes    Select all that apply New or Increased Pain     New or Increased Fatigue     Stress    Do you have a Living Will? Yes    Health Habits/

## 2025-06-18 NOTE — ASSESSMENT & PLAN NOTE
-Discussed differentials including interstitial cystitis, ureaplasma/mycoplasma, or vaginal infection  -Encouraged patient to increase daily water intake, discussed possible usefulness of supplements such as cranberry, vitamin C, and zinc  -Discussed benefits, risks, and side effects of prescribed medication  -Urine dip in office: glucose, trace blood, protein, negative leuks, positive nitrates  -Urine sent for culture based on symptoms  -Discussed with patient warning signs to return to the office and s/s pyelonephritis

## 2025-06-18 NOTE — ASSESSMENT & PLAN NOTE
-Provided patient with list of OTC medications to take for symptom management  -Augmentin prescribed for UTI.  -Discussed increasing daily fluid intake  -Advised patient to continue wearing a mask around other people while symptomatic and encouraged good hand hygiene   -Advised patient to contact the office if no improvement in symptoms by day ten of symptoms

## 2025-06-21 LAB — BACTERIA UR CULT: ABNORMAL

## 2025-06-24 DIAGNOSIS — J45.20 MILD INTERMITTENT EXTRINSIC ASTHMA WITHOUT COMPLICATION: ICD-10-CM

## 2025-06-25 RX ORDER — ALBUTEROL SULFATE 90 UG/1
2 INHALANT RESPIRATORY (INHALATION) 4 TIMES DAILY PRN
Qty: 1 EACH | Refills: 3 | Status: SHIPPED | OUTPATIENT
Start: 2025-06-25

## 2025-06-30 ENCOUNTER — RESULTS FOLLOW-UP (OUTPATIENT)
Facility: CLINIC | Age: 50
End: 2025-06-30

## 2025-06-30 DIAGNOSIS — N30.90 CYSTITIS: ICD-10-CM

## 2025-06-30 DIAGNOSIS — N30.90 CYSTITIS: Primary | ICD-10-CM

## 2025-06-30 RX ORDER — CEFPODOXIME PROXETIL 100 MG/1
100 TABLET, FILM COATED ORAL 2 TIMES DAILY
Qty: 20 TABLET | Refills: 0 | Status: SHIPPED | OUTPATIENT
Start: 2025-06-30 | End: 2025-07-10

## 2025-06-30 RX ORDER — FLUCONAZOLE 150 MG/1
TABLET ORAL
Qty: 2 TABLET | Refills: 0 | Status: SHIPPED | OUTPATIENT
Start: 2025-06-30

## 2025-07-07 ENCOUNTER — HOSPITAL ENCOUNTER (OUTPATIENT)
Facility: HOSPITAL | Age: 50
Discharge: HOME | End: 2025-07-10
Payer: MEDICARE

## 2025-07-07 PROCEDURE — 77063 BREAST TOMOSYNTHESIS BI: CPT

## 2025-07-17 DIAGNOSIS — K63.2 ENTEROCUTANEOUS FISTULA: Primary | ICD-10-CM

## 2025-07-31 ENCOUNTER — OFFICE VISIT (OUTPATIENT)
Age: 50
End: 2025-07-31
Payer: MEDICARE

## 2025-07-31 VITALS
BODY MASS INDEX: 34.82 KG/M2 | HEART RATE: 80 BPM | OXYGEN SATURATION: 98 % | HEIGHT: 62 IN | SYSTOLIC BLOOD PRESSURE: 118 MMHG | RESPIRATION RATE: 15 BRPM | DIASTOLIC BLOOD PRESSURE: 82 MMHG | TEMPERATURE: 97 F | WEIGHT: 189.2 LBS

## 2025-07-31 DIAGNOSIS — R10.9 ABDOMINAL PAIN, UNSPECIFIED ABDOMINAL LOCATION: ICD-10-CM

## 2025-07-31 DIAGNOSIS — K63.2 ENTEROCUTANEOUS FISTULA: Primary | ICD-10-CM

## 2025-07-31 DIAGNOSIS — K50.813 CROHN'S DISEASE OF BOTH SMALL AND LARGE INTESTINE WITH FISTULA (HCC): ICD-10-CM

## 2025-07-31 PROCEDURE — 99203 OFFICE O/P NEW LOW 30 MIN: CPT | Performed by: INTERNAL MEDICINE

## 2025-07-31 PROCEDURE — 4004F PT TOBACCO SCREEN RCVD TLK: CPT | Performed by: INTERNAL MEDICINE

## 2025-07-31 PROCEDURE — G8427 DOCREV CUR MEDS BY ELIG CLIN: HCPCS | Performed by: INTERNAL MEDICINE

## 2025-07-31 PROCEDURE — G8417 CALC BMI ABV UP PARAM F/U: HCPCS | Performed by: INTERNAL MEDICINE

## 2025-07-31 RX ORDER — CERTOLIZUMAB PEGOL 200 MG/ML
400 INJECTION, SOLUTION SUBCUTANEOUS ONCE
COMMUNITY
Start: 2025-06-30

## 2025-07-31 ASSESSMENT — ENCOUNTER SYMPTOMS: RESPIRATORY NEGATIVE: 1

## 2025-07-31 NOTE — PROGRESS NOTES
Have you been to the ER, urgent care clinic since your last visit?  Hospitalized since your last visit?   NO    Have you seen or consulted any other health care providers outside our system since your last visit?   YES - When: approximately 3  weeks ago.  Where and Why: pcp- for infection.      “Have you had a diabetic eye exam?”    NO     No diabetic eye exam on file     Chief Complaint   Patient presents with    New Patient    Other     Entercutaneous fistula- pt stated that her ostomy is constantly getting infected from the fistula being opened     /82 (BP Site: Left Upper Arm, Patient Position: Sitting, BP Cuff Size: Large Adult)   Pulse 80   Temp 97 °F (36.1 °C) (Temporal)   Resp 15   Ht 1.575 m (5' 2\")   Wt 85.8 kg (189 lb 3.2 oz)   SpO2 98%   BMI 34.61 kg/m²         
(See Comments) and Rash     Other reaction(s): RASH, ITCHING    Reaction Type: Allergy; Reaction(s): hives and itching    Other Reaction(s): Unknown    Ketorolac Tromethamine Nausea And Vomiting        Objective  Physical Exam  Constitutional:       Appearance: Normal appearance.   Cardiovascular:      Rate and Rhythm: Normal rate and regular rhythm.   Pulmonary:      Effort: Pulmonary effort is normal.      Breath sounds: Normal breath sounds.   Abdominal:      General: Abdomen is flat.      Palpations: Abdomen is soft.      Comments: Enterocutaneous abdominal wall fistula with some purulent drainage.  Jejunostomy. Some tenderness on palpation    Musculoskeletal:         General: Normal range of motion.   Skin:     General: Skin is warm and dry.   Neurological:      General: No focal deficit present.      Mental Status: She is alert and oriented to person, place, and time.           Assessment & Plan  Cindy was seen today for new patient and other.    Diagnoses and all orders for this visit:    Enterocutaneous fistula  -     amoxicillin-clavulanate (AUGMENTIN) 875-125 MG per tablet; Take 1 tablet by mouth 2 times daily for 10 days  -     CULTURE, ANAEROBIC AND AEROBIC; Future  Some   - Does not appear acutely ill, denies fever/chills  - Prescribed a short course of Augmentin which has helped before, while awaiting wound Cx results  - Consider IV antibiotics per Cx results   - Repeat CT abdo to eval for drainable collection if no improvement w conservative mgt  - Pt will be contacted w Cx results once available     Abdominal pain, unspecified abdominal location: Often exacerbated by infection     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

## 2025-08-07 LAB
BACTERIA SPEC AEROBE CULT: ABNORMAL
BACTERIA SPEC AEROBE CULT: ABNORMAL
BACTERIA SPEC ANAEROBE CULT: ABNORMAL

## 2025-08-08 DIAGNOSIS — K63.2 ENTEROCUTANEOUS FISTULA: Primary | ICD-10-CM

## 2025-08-08 DIAGNOSIS — T81.49XA ABSCESS OF POSTOPERATIVE WOUND OF ABDOMINAL WALL: ICD-10-CM

## 2025-08-08 DIAGNOSIS — L02.211 ABSCESS OF POSTOPERATIVE WOUND OF ABDOMINAL WALL: ICD-10-CM

## 2025-08-21 ENCOUNTER — APPOINTMENT (OUTPATIENT)
Facility: HOSPITAL | Age: 50
DRG: 439 | End: 2025-08-21
Payer: MEDICARE

## 2025-08-21 ENCOUNTER — HOSPITAL ENCOUNTER (INPATIENT)
Facility: HOSPITAL | Age: 50
LOS: 3 days | Discharge: HOME | DRG: 439 | End: 2025-08-24
Attending: EMERGENCY MEDICINE | Admitting: STUDENT IN AN ORGANIZED HEALTH CARE EDUCATION/TRAINING PROGRAM
Payer: MEDICARE

## 2025-08-21 DIAGNOSIS — K85.90 ACUTE PANCREATITIS WITHOUT INFECTION OR NECROSIS, UNSPECIFIED PANCREATITIS TYPE: Primary | ICD-10-CM

## 2025-08-21 DIAGNOSIS — I24.9 ACUTE CORONARY SYNDROME (HCC): ICD-10-CM

## 2025-08-21 DIAGNOSIS — R79.89 ELEVATED TROPONIN: ICD-10-CM

## 2025-08-21 DIAGNOSIS — R11.2 NAUSEA AND VOMITING IN ADULT: ICD-10-CM

## 2025-08-21 DIAGNOSIS — K50.113 CROHN'S DISEASE OF COLON WITH FISTULA (HCC): ICD-10-CM

## 2025-08-21 DIAGNOSIS — R10.13 ABDOMINAL PAIN, EPIGASTRIC: ICD-10-CM

## 2025-08-21 LAB
ALBUMIN SERPL-MCNC: 3.5 G/DL (ref 3.5–5.2)
ALBUMIN/GLOB SERPL: 0.9 (ref 1.1–2.2)
ALP SERPL-CCNC: 103 U/L (ref 35–104)
ALT SERPL-CCNC: 21 U/L (ref 10–35)
ANION GAP SERPL CALC-SCNC: 13 MMOL/L (ref 2–12)
AST SERPL-CCNC: 30 U/L (ref 10–35)
BASOPHILS # BLD: 0.01 K/UL (ref 0–0.1)
BASOPHILS NFR BLD: 0.2 % (ref 0–1)
BILIRUB SERPL-MCNC: 0.7 MG/DL (ref 0–1.2)
BUN SERPL-MCNC: 11 MG/DL (ref 6–20)
BUN/CREAT SERPL: 21 (ref 12–20)
CALCIUM SERPL-MCNC: 8.6 MG/DL (ref 8.6–10)
CHLORIDE SERPL-SCNC: 100 MMOL/L (ref 98–107)
CO2 SERPL-SCNC: 23 MMOL/L (ref 22–29)
COMMENT:: NORMAL
CREAT SERPL-MCNC: 0.53 MG/DL (ref 0.5–0.9)
CRP SERPL-MCNC: 1 MG/DL (ref 0–0.5)
DIFFERENTIAL METHOD BLD: ABNORMAL
EOSINOPHIL # BLD: 0.28 K/UL (ref 0–0.4)
EOSINOPHIL NFR BLD: 4.5 % (ref 0–7)
ERYTHROCYTE [DISTWIDTH] IN BLOOD BY AUTOMATED COUNT: 14.5 % (ref 11.5–14.5)
ERYTHROCYTE [SEDIMENTATION RATE] IN BLOOD: 40 MM/HR (ref 0–20)
GLOBULIN SER CALC-MCNC: 3.7 G/DL (ref 2–4)
GLUCOSE SERPL-MCNC: 132 MG/DL (ref 65–100)
HCT VFR BLD AUTO: 36.5 % (ref 35–47)
HGB BLD-MCNC: 12.7 G/DL (ref 11.5–16)
IMM GRANULOCYTES # BLD AUTO: 0.02 K/UL (ref 0–0.04)
IMM GRANULOCYTES NFR BLD AUTO: 0.3 % (ref 0–0.5)
LIPASE SERPL-CCNC: 957 U/L (ref 13–60)
LYMPHOCYTES # BLD: 1.79 K/UL (ref 0.8–3.5)
LYMPHOCYTES NFR BLD: 28.9 % (ref 12–49)
MAGNESIUM SERPL-MCNC: 1.3 MG/DL (ref 1.6–2.6)
MCH RBC QN AUTO: 39.2 PG (ref 26–34)
MCHC RBC AUTO-ENTMCNC: 34.8 G/DL (ref 30–36.5)
MCV RBC AUTO: 112.7 FL (ref 80–99)
MONOCYTES # BLD: 0.33 K/UL (ref 0–1)
MONOCYTES NFR BLD: 5.4 % (ref 5–13)
NEUTS SEG # BLD: 3.77 K/UL (ref 1.8–8)
NEUTS SEG NFR BLD: 60.7 % (ref 32–75)
NRBC # BLD: 0 K/UL (ref 0–0.01)
NRBC BLD-RTO: 0 PER 100 WBC
PLATELET # BLD AUTO: 98 K/UL (ref 150–400)
PMV BLD AUTO: 10.9 FL (ref 8.9–12.9)
POTASSIUM SERPL-SCNC: 3.8 MMOL/L (ref 3.5–5.1)
PROT SERPL-MCNC: 7.2 G/DL (ref 6.4–8.3)
RBC # BLD AUTO: 3.24 M/UL (ref 3.8–5.2)
RBC MORPH BLD: ABNORMAL
SODIUM SERPL-SCNC: 136 MMOL/L (ref 136–145)
SPECIMEN HOLD: NORMAL
TROPONIN T SERPL HS-MCNC: 32.6 NG/L (ref 0–14)
WBC # BLD AUTO: 6.2 K/UL (ref 3.6–11)
WBC MORPH BLD: ABNORMAL

## 2025-08-21 PROCEDURE — 96361 HYDRATE IV INFUSION ADD-ON: CPT

## 2025-08-21 PROCEDURE — 99285 EMERGENCY DEPT VISIT HI MDM: CPT

## 2025-08-21 PROCEDURE — 93005 ELECTROCARDIOGRAM TRACING: CPT | Performed by: EMERGENCY MEDICINE

## 2025-08-21 PROCEDURE — 2580000003 HC RX 258: Performed by: EMERGENCY MEDICINE

## 2025-08-21 PROCEDURE — 71045 X-RAY EXAM CHEST 1 VIEW: CPT

## 2025-08-21 PROCEDURE — 1100000000 HC RM PRIVATE

## 2025-08-21 PROCEDURE — 85025 COMPLETE CBC W/AUTO DIFF WBC: CPT

## 2025-08-21 PROCEDURE — 86140 C-REACTIVE PROTEIN: CPT

## 2025-08-21 PROCEDURE — 83735 ASSAY OF MAGNESIUM: CPT

## 2025-08-21 PROCEDURE — 74177 CT ABD & PELVIS W/CONTRAST: CPT

## 2025-08-21 PROCEDURE — 6360000004 HC RX CONTRAST MEDICATION: Performed by: EMERGENCY MEDICINE

## 2025-08-21 PROCEDURE — 84484 ASSAY OF TROPONIN QUANT: CPT

## 2025-08-21 PROCEDURE — 96365 THER/PROPH/DIAG IV INF INIT: CPT

## 2025-08-21 PROCEDURE — 96375 TX/PRO/DX INJ NEW DRUG ADDON: CPT

## 2025-08-21 PROCEDURE — 36415 COLL VENOUS BLD VENIPUNCTURE: CPT

## 2025-08-21 PROCEDURE — 85652 RBC SED RATE AUTOMATED: CPT

## 2025-08-21 PROCEDURE — 80053 COMPREHEN METABOLIC PANEL: CPT

## 2025-08-21 PROCEDURE — 83690 ASSAY OF LIPASE: CPT

## 2025-08-21 PROCEDURE — 6360000002 HC RX W HCPCS: Performed by: EMERGENCY MEDICINE

## 2025-08-21 PROCEDURE — 96376 TX/PRO/DX INJ SAME DRUG ADON: CPT

## 2025-08-21 PROCEDURE — 6370000000 HC RX 637 (ALT 250 FOR IP): Performed by: EMERGENCY MEDICINE

## 2025-08-21 RX ORDER — METOCLOPRAMIDE HYDROCHLORIDE 5 MG/ML
10 INJECTION INTRAMUSCULAR; INTRAVENOUS
Status: COMPLETED | OUTPATIENT
Start: 2025-08-21 | End: 2025-08-21

## 2025-08-21 RX ORDER — IOPAMIDOL 755 MG/ML
100 INJECTION, SOLUTION INTRAVASCULAR
Status: COMPLETED | OUTPATIENT
Start: 2025-08-21 | End: 2025-08-21

## 2025-08-21 RX ORDER — DIPHENHYDRAMINE HYDROCHLORIDE 50 MG/ML
25 INJECTION, SOLUTION INTRAMUSCULAR; INTRAVENOUS
Status: COMPLETED | OUTPATIENT
Start: 2025-08-21 | End: 2025-08-21

## 2025-08-21 RX ORDER — 0.9 % SODIUM CHLORIDE 0.9 %
1000 INTRAVENOUS SOLUTION INTRAVENOUS ONCE
Status: COMPLETED | OUTPATIENT
Start: 2025-08-21 | End: 2025-08-21

## 2025-08-21 RX ORDER — ONDANSETRON 2 MG/ML
4 INJECTION INTRAMUSCULAR; INTRAVENOUS
Status: COMPLETED | OUTPATIENT
Start: 2025-08-21 | End: 2025-08-21

## 2025-08-21 RX ORDER — MAGNESIUM SULFATE IN WATER 40 MG/ML
2000 INJECTION, SOLUTION INTRAVENOUS ONCE
Status: COMPLETED | OUTPATIENT
Start: 2025-08-21 | End: 2025-08-21

## 2025-08-21 RX ADMIN — METOCLOPRAMIDE 10 MG: 5 INJECTION, SOLUTION INTRAMUSCULAR; INTRAVENOUS at 22:33

## 2025-08-21 RX ADMIN — MAGNESIUM SULFATE HEPTAHYDRATE 2000 MG: 40 INJECTION, SOLUTION INTRAVENOUS at 20:51

## 2025-08-21 RX ADMIN — DIPHENHYDRAMINE HYDROCHLORIDE 25 MG: 50 INJECTION INTRAMUSCULAR; INTRAVENOUS at 18:57

## 2025-08-21 RX ADMIN — ONDANSETRON 4 MG: 2 INJECTION, SOLUTION INTRAMUSCULAR; INTRAVENOUS at 20:44

## 2025-08-21 RX ADMIN — ALUMINUM HYDROXIDE, MAGNESIUM HYDROXIDE, AND SIMETHICONE 40 ML: 200; 20; 200 SUSPENSION ORAL at 19:01

## 2025-08-21 RX ADMIN — METOCLOPRAMIDE 10 MG: 5 INJECTION, SOLUTION INTRAMUSCULAR; INTRAVENOUS at 19:00

## 2025-08-21 RX ADMIN — HYDROMORPHONE HYDROCHLORIDE 1 MG: 1 INJECTION, SOLUTION INTRAMUSCULAR; INTRAVENOUS; SUBCUTANEOUS at 21:45

## 2025-08-21 RX ADMIN — HYDROMORPHONE HYDROCHLORIDE 1 MG: 1 INJECTION, SOLUTION INTRAMUSCULAR; INTRAVENOUS; SUBCUTANEOUS at 20:45

## 2025-08-21 RX ADMIN — IOPAMIDOL 100 ML: 755 INJECTION, SOLUTION INTRAVENOUS at 19:30

## 2025-08-21 RX ADMIN — SODIUM CHLORIDE 1000 ML: 0.9 INJECTION, SOLUTION INTRAVENOUS at 19:06

## 2025-08-21 RX ADMIN — HYDROMORPHONE HYDROCHLORIDE 1 MG: 1 INJECTION, SOLUTION INTRAMUSCULAR; INTRAVENOUS; SUBCUTANEOUS at 19:04

## 2025-08-21 ASSESSMENT — PAIN DESCRIPTION - LOCATION
LOCATION: ABDOMEN

## 2025-08-21 ASSESSMENT — PAIN - FUNCTIONAL ASSESSMENT
PAIN_FUNCTIONAL_ASSESSMENT: 0-10

## 2025-08-21 ASSESSMENT — PAIN DESCRIPTION - ORIENTATION
ORIENTATION: RIGHT;LEFT;LOWER;UPPER
ORIENTATION: RIGHT;LEFT;LOWER;UPPER

## 2025-08-21 ASSESSMENT — PAIN SCALES - GENERAL
PAINLEVEL_OUTOF10: 8
PAINLEVEL_OUTOF10: 6
PAINLEVEL_OUTOF10: 8
PAINLEVEL_OUTOF10: 9
PAINLEVEL_OUTOF10: 9
PAINLEVEL_OUTOF10: 8

## 2025-08-21 ASSESSMENT — PAIN DESCRIPTION - DESCRIPTORS: DESCRIPTORS: BURNING;SHARP

## 2025-08-22 ENCOUNTER — APPOINTMENT (OUTPATIENT)
Facility: HOSPITAL | Age: 50
DRG: 439 | End: 2025-08-22
Attending: STUDENT IN AN ORGANIZED HEALTH CARE EDUCATION/TRAINING PROGRAM
Payer: MEDICARE

## 2025-08-22 LAB
CHOLEST SERPL-MCNC: 173 MG/DL (ref 0–200)
ECHO AO ASC DIAM: 2.9 CM
ECHO AO ASCENDING AORTA INDEX: 1.52 CM/M2
ECHO BSA: 1.99 M2
ECHO LA DIAMETER INDEX: 1.31 CM/M2
ECHO LA DIAMETER: 2.5 CM
ECHO LV EF PHYSICIAN: 55 %
ECHO LV FRACTIONAL SHORTENING: 38 % (ref 28–44)
ECHO LV INTERNAL DIMENSION DIASTOLE INDEX: 2.51 CM/M2
ECHO LV INTERNAL DIMENSION DIASTOLIC: 4.8 CM (ref 3.9–5.3)
ECHO LV INTERNAL DIMENSION SYSTOLIC INDEX: 1.57 CM/M2
ECHO LV INTERNAL DIMENSION SYSTOLIC: 3 CM
ECHO LV IVSD: 0.8 CM (ref 0.6–0.9)
ECHO LV MASS 2D: 116.6 G (ref 67–162)
ECHO LV MASS INDEX 2D: 61.1 G/M2 (ref 43–95)
ECHO LV POSTERIOR WALL DIASTOLIC: 0.7 CM (ref 0.6–0.9)
ECHO LV RELATIVE WALL THICKNESS RATIO: 0.29
ECHO LVOT AREA: 3.8 CM2
ECHO LVOT DIAM: 2.2 CM
ECHO PV MAX VELOCITY: 1 M/S
ECHO PV PEAK GRADIENT: 4 MMHG
ECHO RVOT MEAN GRADIENT: 1 MMHG
ECHO RVOT PEAK GRADIENT: 1 MMHG
ECHO RVOT PEAK VELOCITY: 0.6 M/S
ECHO RVOT VTI: 17 CM
EKG ATRIAL RATE: 81 BPM
EKG DIAGNOSIS: NORMAL
EKG P AXIS: 38 DEGREES
EKG P-R INTERVAL: 148 MS
EKG Q-T INTERVAL: 384 MS
EKG QRS DURATION: 78 MS
EKG QTC CALCULATION (BAZETT): 446 MS
EKG R AXIS: 74 DEGREES
EKG T AXIS: 63 DEGREES
EKG VENTRICULAR RATE: 81 BPM
HDLC SERPL-MCNC: 97 MG/DL (ref 40–60)
HDLC SERPL: 1.8 (ref 0–5)
LDLC SERPL CALC-MCNC: 64 MG/DL (ref 0–100)
MAGNESIUM SERPL-MCNC: 1.8 MG/DL (ref 1.6–2.6)
TRIGL SERPL-MCNC: 58 MG/DL (ref 0–150)
TROPONIN T SERPL HS-MCNC: 33.9 NG/L (ref 0–14)
VLDLC SERPL CALC-MCNC: 12 MG/DL

## 2025-08-22 PROCEDURE — 6370000000 HC RX 637 (ALT 250 FOR IP): Performed by: STUDENT IN AN ORGANIZED HEALTH CARE EDUCATION/TRAINING PROGRAM

## 2025-08-22 PROCEDURE — 6360000002 HC RX W HCPCS: Performed by: STUDENT IN AN ORGANIZED HEALTH CARE EDUCATION/TRAINING PROGRAM

## 2025-08-22 PROCEDURE — 94761 N-INVAS EAR/PLS OXIMETRY MLT: CPT

## 2025-08-22 PROCEDURE — 93325 DOPPLER ECHO COLOR FLOW MAPG: CPT | Performed by: STUDENT IN AN ORGANIZED HEALTH CARE EDUCATION/TRAINING PROGRAM

## 2025-08-22 PROCEDURE — 1100000000 HC RM PRIVATE

## 2025-08-22 PROCEDURE — 93010 ELECTROCARDIOGRAM REPORT: CPT | Performed by: INTERNAL MEDICINE

## 2025-08-22 PROCEDURE — 93321 DOPPLER ECHO F-UP/LMTD STD: CPT | Performed by: STUDENT IN AN ORGANIZED HEALTH CARE EDUCATION/TRAINING PROGRAM

## 2025-08-22 PROCEDURE — 93308 TTE F-UP OR LMTD: CPT | Performed by: STUDENT IN AN ORGANIZED HEALTH CARE EDUCATION/TRAINING PROGRAM

## 2025-08-22 PROCEDURE — 6360000002 HC RX W HCPCS: Performed by: INTERNAL MEDICINE

## 2025-08-22 PROCEDURE — 2580000003 HC RX 258: Performed by: STUDENT IN AN ORGANIZED HEALTH CARE EDUCATION/TRAINING PROGRAM

## 2025-08-22 PROCEDURE — 84484 ASSAY OF TROPONIN QUANT: CPT

## 2025-08-22 PROCEDURE — 83735 ASSAY OF MAGNESIUM: CPT

## 2025-08-22 PROCEDURE — 2580000003 HC RX 258: Performed by: INTERNAL MEDICINE

## 2025-08-22 PROCEDURE — 80061 LIPID PANEL: CPT

## 2025-08-22 PROCEDURE — 36415 COLL VENOUS BLD VENIPUNCTURE: CPT

## 2025-08-22 PROCEDURE — 2500000003 HC RX 250 WO HCPCS: Performed by: STUDENT IN AN ORGANIZED HEALTH CARE EDUCATION/TRAINING PROGRAM

## 2025-08-22 PROCEDURE — 93306 TTE W/DOPPLER COMPLETE: CPT

## 2025-08-22 RX ORDER — ONDANSETRON 4 MG/1
4 TABLET, ORALLY DISINTEGRATING ORAL EVERY 8 HOURS PRN
Status: DISCONTINUED | OUTPATIENT
Start: 2025-08-22 | End: 2025-08-24 | Stop reason: HOSPADM

## 2025-08-22 RX ORDER — OXYCODONE HYDROCHLORIDE 5 MG/1
5 TABLET ORAL EVERY 6 HOURS PRN
Refills: 0 | Status: DISCONTINUED | OUTPATIENT
Start: 2025-08-22 | End: 2025-08-22

## 2025-08-22 RX ORDER — ENOXAPARIN SODIUM 100 MG/ML
40 INJECTION SUBCUTANEOUS DAILY
Status: DISCONTINUED | OUTPATIENT
Start: 2025-08-22 | End: 2025-08-24 | Stop reason: HOSPADM

## 2025-08-22 RX ORDER — ACETAMINOPHEN 650 MG/1
650 SUPPOSITORY RECTAL EVERY 6 HOURS PRN
Status: DISCONTINUED | OUTPATIENT
Start: 2025-08-22 | End: 2025-08-24 | Stop reason: HOSPADM

## 2025-08-22 RX ORDER — MORPHINE SULFATE 15 MG/1
30 TABLET ORAL EVERY 4 HOURS PRN
Refills: 0 | Status: DISCONTINUED | OUTPATIENT
Start: 2025-08-22 | End: 2025-08-22

## 2025-08-22 RX ORDER — ONDANSETRON 2 MG/ML
4 INJECTION INTRAMUSCULAR; INTRAVENOUS EVERY 6 HOURS PRN
Status: DISCONTINUED | OUTPATIENT
Start: 2025-08-22 | End: 2025-08-24 | Stop reason: HOSPADM

## 2025-08-22 RX ORDER — ACETAMINOPHEN 325 MG/1
650 TABLET ORAL EVERY 6 HOURS PRN
Status: DISCONTINUED | OUTPATIENT
Start: 2025-08-22 | End: 2025-08-24 | Stop reason: HOSPADM

## 2025-08-22 RX ORDER — NALOXONE HYDROCHLORIDE 0.4 MG/ML
0.4 INJECTION, SOLUTION INTRAMUSCULAR; INTRAVENOUS; SUBCUTANEOUS PRN
Status: DISCONTINUED | OUTPATIENT
Start: 2025-08-22 | End: 2025-08-24 | Stop reason: HOSPADM

## 2025-08-22 RX ORDER — HYDROMORPHONE HYDROCHLORIDE 1 MG/ML
1 INJECTION, SOLUTION INTRAMUSCULAR; INTRAVENOUS; SUBCUTANEOUS
Status: DISCONTINUED | OUTPATIENT
Start: 2025-08-22 | End: 2025-08-24 | Stop reason: HOSPADM

## 2025-08-22 RX ORDER — POTASSIUM CHLORIDE 750 MG/1
40 TABLET, EXTENDED RELEASE ORAL PRN
Status: DISCONTINUED | OUTPATIENT
Start: 2025-08-22 | End: 2025-08-24 | Stop reason: HOSPADM

## 2025-08-22 RX ORDER — SODIUM CHLORIDE 9 MG/ML
INJECTION, SOLUTION INTRAVENOUS PRN
Status: DISCONTINUED | OUTPATIENT
Start: 2025-08-22 | End: 2025-08-24 | Stop reason: HOSPADM

## 2025-08-22 RX ORDER — SODIUM CHLORIDE, SODIUM LACTATE, POTASSIUM CHLORIDE, CALCIUM CHLORIDE 600; 310; 30; 20 MG/100ML; MG/100ML; MG/100ML; MG/100ML
INJECTION, SOLUTION INTRAVENOUS CONTINUOUS
Status: DISPENSED | OUTPATIENT
Start: 2025-08-22 | End: 2025-08-23

## 2025-08-22 RX ORDER — SODIUM CHLORIDE 0.9 % (FLUSH) 0.9 %
5-40 SYRINGE (ML) INJECTION EVERY 12 HOURS SCHEDULED
Status: DISCONTINUED | OUTPATIENT
Start: 2025-08-22 | End: 2025-08-24 | Stop reason: HOSPADM

## 2025-08-22 RX ORDER — KETOROLAC TROMETHAMINE 30 MG/ML
30 INJECTION, SOLUTION INTRAMUSCULAR; INTRAVENOUS EVERY 6 HOURS PRN
Status: DISCONTINUED | OUTPATIENT
Start: 2025-08-22 | End: 2025-08-24 | Stop reason: HOSPADM

## 2025-08-22 RX ORDER — SODIUM CHLORIDE 0.9 % (FLUSH) 0.9 %
5-40 SYRINGE (ML) INJECTION PRN
Status: DISCONTINUED | OUTPATIENT
Start: 2025-08-22 | End: 2025-08-24 | Stop reason: HOSPADM

## 2025-08-22 RX ORDER — ESCITALOPRAM OXALATE 10 MG/1
10 TABLET ORAL DAILY
Status: DISCONTINUED | OUTPATIENT
Start: 2025-08-22 | End: 2025-08-24 | Stop reason: HOSPADM

## 2025-08-22 RX ORDER — POLYETHYLENE GLYCOL 3350 17 G/17G
17 POWDER, FOR SOLUTION ORAL DAILY PRN
Status: DISCONTINUED | OUTPATIENT
Start: 2025-08-22 | End: 2025-08-24 | Stop reason: HOSPADM

## 2025-08-22 RX ORDER — PROCHLORPERAZINE EDISYLATE 5 MG/ML
5 INJECTION INTRAMUSCULAR; INTRAVENOUS EVERY 4 HOURS PRN
Status: DISCONTINUED | OUTPATIENT
Start: 2025-08-22 | End: 2025-08-24 | Stop reason: HOSPADM

## 2025-08-22 RX ORDER — PROPRANOLOL HYDROCHLORIDE 10 MG/1
10 TABLET ORAL 3 TIMES DAILY
Status: DISCONTINUED | OUTPATIENT
Start: 2025-08-22 | End: 2025-08-24 | Stop reason: HOSPADM

## 2025-08-22 RX ORDER — MORPHINE SULFATE 15 MG/1
30 TABLET ORAL EVERY 6 HOURS PRN
Refills: 0 | Status: DISCONTINUED | OUTPATIENT
Start: 2025-08-22 | End: 2025-08-24 | Stop reason: HOSPADM

## 2025-08-22 RX ORDER — POTASSIUM CHLORIDE 7.45 MG/ML
10 INJECTION INTRAVENOUS PRN
Status: DISCONTINUED | OUTPATIENT
Start: 2025-08-22 | End: 2025-08-24 | Stop reason: HOSPADM

## 2025-08-22 RX ADMIN — HYDROMORPHONE HYDROCHLORIDE 1 MG: 1 INJECTION, SOLUTION INTRAMUSCULAR; INTRAVENOUS; SUBCUTANEOUS at 09:10

## 2025-08-22 RX ADMIN — HYDROMORPHONE HYDROCHLORIDE 1 MG: 1 INJECTION, SOLUTION INTRAMUSCULAR; INTRAVENOUS; SUBCUTANEOUS at 15:28

## 2025-08-22 RX ADMIN — HYDROMORPHONE HYDROCHLORIDE 1 MG: 1 INJECTION, SOLUTION INTRAMUSCULAR; INTRAVENOUS; SUBCUTANEOUS at 12:10

## 2025-08-22 RX ADMIN — PROPRANOLOL HYDROCHLORIDE 10 MG: 10 TABLET ORAL at 08:53

## 2025-08-22 RX ADMIN — HYDROMORPHONE HYDROCHLORIDE 1 MG: 1 INJECTION, SOLUTION INTRAMUSCULAR; INTRAVENOUS; SUBCUTANEOUS at 01:40

## 2025-08-22 RX ADMIN — SODIUM CHLORIDE, PRESERVATIVE FREE 10 ML: 5 INJECTION INTRAVENOUS at 08:55

## 2025-08-22 RX ADMIN — ACETAMINOPHEN 650 MG: 325 TABLET ORAL at 09:08

## 2025-08-22 RX ADMIN — PROPRANOLOL HYDROCHLORIDE 10 MG: 10 TABLET ORAL at 13:39

## 2025-08-22 RX ADMIN — HYDROMORPHONE HYDROCHLORIDE 1 MG: 1 INJECTION, SOLUTION INTRAMUSCULAR; INTRAVENOUS; SUBCUTANEOUS at 06:19

## 2025-08-22 RX ADMIN — KETOROLAC TROMETHAMINE 30 MG: 30 INJECTION, SOLUTION INTRAMUSCULAR at 13:39

## 2025-08-22 RX ADMIN — HYDROMORPHONE HYDROCHLORIDE 1 MG: 1 INJECTION, SOLUTION INTRAMUSCULAR; INTRAVENOUS; SUBCUTANEOUS at 18:47

## 2025-08-22 RX ADMIN — ENOXAPARIN SODIUM 40 MG: 100 INJECTION SUBCUTANEOUS at 08:52

## 2025-08-22 RX ADMIN — MORPHINE SULFATE 30 MG: 15 TABLET ORAL at 03:00

## 2025-08-22 RX ADMIN — SODIUM CHLORIDE, SODIUM LACTATE, POTASSIUM CHLORIDE, AND CALCIUM CHLORIDE: .6; .31; .03; .02 INJECTION, SOLUTION INTRAVENOUS at 06:54

## 2025-08-22 RX ADMIN — MORPHINE SULFATE 30 MG: 15 TABLET ORAL at 20:54

## 2025-08-22 RX ADMIN — PANTOPRAZOLE SODIUM 40 MG: 40 INJECTION, POWDER, FOR SOLUTION INTRAVENOUS at 06:19

## 2025-08-22 RX ADMIN — PROCHLORPERAZINE EDISYLATE 5 MG: 5 INJECTION INTRAMUSCULAR; INTRAVENOUS at 22:31

## 2025-08-22 RX ADMIN — SODIUM CHLORIDE, SODIUM LACTATE, POTASSIUM CHLORIDE, AND CALCIUM CHLORIDE: .6; .31; .03; .02 INJECTION, SOLUTION INTRAVENOUS at 22:36

## 2025-08-22 RX ADMIN — ESCITALOPRAM OXALATE 10 MG: 10 TABLET ORAL at 08:53

## 2025-08-22 RX ADMIN — ERTAPENEM SODIUM 1000 MG: 1 INJECTION INTRAMUSCULAR; INTRAVENOUS at 08:58

## 2025-08-22 RX ADMIN — PROPRANOLOL HYDROCHLORIDE 10 MG: 10 TABLET ORAL at 20:54

## 2025-08-22 RX ADMIN — SODIUM CHLORIDE, PRESERVATIVE FREE 10 ML: 5 INJECTION INTRAVENOUS at 08:54

## 2025-08-22 RX ADMIN — SODIUM CHLORIDE, SODIUM LACTATE, POTASSIUM CHLORIDE, AND CALCIUM CHLORIDE: .6; .31; .03; .02 INJECTION, SOLUTION INTRAVENOUS at 15:39

## 2025-08-22 RX ADMIN — ONDANSETRON 4 MG: 4 TABLET, ORALLY DISINTEGRATING ORAL at 09:08

## 2025-08-22 ASSESSMENT — PAIN SCALES - GENERAL
PAINLEVEL_OUTOF10: 8
PAINLEVEL_OUTOF10: 9
PAINLEVEL_OUTOF10: 7
PAINLEVEL_OUTOF10: 5
PAINLEVEL_OUTOF10: 7
PAINLEVEL_OUTOF10: 8
PAINLEVEL_OUTOF10: 6

## 2025-08-22 ASSESSMENT — PAIN DESCRIPTION - ORIENTATION: ORIENTATION: INNER;RIGHT;UPPER

## 2025-08-22 ASSESSMENT — PAIN - FUNCTIONAL ASSESSMENT
PAIN_FUNCTIONAL_ASSESSMENT: 0-10

## 2025-08-22 ASSESSMENT — PAIN DESCRIPTION - LOCATION
LOCATION: ABDOMEN

## 2025-08-22 ASSESSMENT — PAIN DESCRIPTION - DESCRIPTORS
DESCRIPTORS: JABBING;SHARP
DESCRIPTORS: ACHING;SHARP

## 2025-08-23 LAB
ANION GAP SERPL CALC-SCNC: 11 MMOL/L (ref 2–14)
BUN SERPL-MCNC: 10 MG/DL (ref 6–20)
BUN/CREAT SERPL: 16 (ref 12–20)
CALCIUM SERPL-MCNC: 8.7 MG/DL (ref 8.6–10)
CHLORIDE SERPL-SCNC: 99 MMOL/L (ref 98–107)
CO2 SERPL-SCNC: 26 MMOL/L (ref 20–29)
CREAT SERPL-MCNC: 0.61 MG/DL (ref 0.6–1)
GLUCOSE SERPL-MCNC: 56 MG/DL (ref 65–100)
LIPASE SERPL-CCNC: 165 U/L (ref 13–60)
MAGNESIUM SERPL-MCNC: 1.6 MG/DL (ref 1.6–2.6)
PHOSPHATE SERPL-MCNC: 2.6 MG/DL (ref 2.5–4.5)
POTASSIUM SERPL-SCNC: 4.3 MMOL/L (ref 3.5–5.1)
SODIUM SERPL-SCNC: 136 MMOL/L (ref 136–145)

## 2025-08-23 PROCEDURE — 84100 ASSAY OF PHOSPHORUS: CPT

## 2025-08-23 PROCEDURE — 2580000003 HC RX 258: Performed by: INTERNAL MEDICINE

## 2025-08-23 PROCEDURE — 1100000000 HC RM PRIVATE

## 2025-08-23 PROCEDURE — 6370000000 HC RX 637 (ALT 250 FOR IP): Performed by: NURSE PRACTITIONER

## 2025-08-23 PROCEDURE — 94761 N-INVAS EAR/PLS OXIMETRY MLT: CPT

## 2025-08-23 PROCEDURE — 6360000002 HC RX W HCPCS: Performed by: STUDENT IN AN ORGANIZED HEALTH CARE EDUCATION/TRAINING PROGRAM

## 2025-08-23 PROCEDURE — 6360000002 HC RX W HCPCS: Performed by: INTERNAL MEDICINE

## 2025-08-23 PROCEDURE — 83690 ASSAY OF LIPASE: CPT

## 2025-08-23 PROCEDURE — 80048 BASIC METABOLIC PNL TOTAL CA: CPT

## 2025-08-23 PROCEDURE — 36415 COLL VENOUS BLD VENIPUNCTURE: CPT

## 2025-08-23 PROCEDURE — 6370000000 HC RX 637 (ALT 250 FOR IP): Performed by: STUDENT IN AN ORGANIZED HEALTH CARE EDUCATION/TRAINING PROGRAM

## 2025-08-23 PROCEDURE — 2500000003 HC RX 250 WO HCPCS: Performed by: STUDENT IN AN ORGANIZED HEALTH CARE EDUCATION/TRAINING PROGRAM

## 2025-08-23 PROCEDURE — 2580000003 HC RX 258: Performed by: STUDENT IN AN ORGANIZED HEALTH CARE EDUCATION/TRAINING PROGRAM

## 2025-08-23 PROCEDURE — 83735 ASSAY OF MAGNESIUM: CPT

## 2025-08-23 RX ORDER — SODIUM CHLORIDE, SODIUM LACTATE, POTASSIUM CHLORIDE, CALCIUM CHLORIDE 600; 310; 30; 20 MG/100ML; MG/100ML; MG/100ML; MG/100ML
INJECTION, SOLUTION INTRAVENOUS CONTINUOUS
Status: DISCONTINUED | OUTPATIENT
Start: 2025-08-23 | End: 2025-08-24 | Stop reason: HOSPADM

## 2025-08-23 RX ADMIN — PROCHLORPERAZINE EDISYLATE 5 MG: 5 INJECTION INTRAMUSCULAR; INTRAVENOUS at 06:57

## 2025-08-23 RX ADMIN — ONDANSETRON 4 MG: 4 TABLET, ORALLY DISINTEGRATING ORAL at 13:52

## 2025-08-23 RX ADMIN — SODIUM CHLORIDE, SODIUM LACTATE, POTASSIUM CHLORIDE, AND CALCIUM CHLORIDE: .6; .31; .03; .02 INJECTION, SOLUTION INTRAVENOUS at 05:10

## 2025-08-23 RX ADMIN — ESCITALOPRAM OXALATE 10 MG: 10 TABLET ORAL at 09:15

## 2025-08-23 RX ADMIN — PANTOPRAZOLE SODIUM 40 MG: 40 INJECTION, POWDER, FOR SOLUTION INTRAVENOUS at 09:15

## 2025-08-23 RX ADMIN — Medication 3 MG: at 20:40

## 2025-08-23 RX ADMIN — HYDROMORPHONE HYDROCHLORIDE 1 MG: 1 INJECTION, SOLUTION INTRAMUSCULAR; INTRAVENOUS; SUBCUTANEOUS at 00:25

## 2025-08-23 RX ADMIN — PROPRANOLOL HYDROCHLORIDE 10 MG: 10 TABLET ORAL at 13:51

## 2025-08-23 RX ADMIN — MORPHINE SULFATE 30 MG: 15 TABLET ORAL at 03:08

## 2025-08-23 RX ADMIN — SODIUM CHLORIDE, PRESERVATIVE FREE 10 ML: 5 INJECTION INTRAVENOUS at 20:41

## 2025-08-23 RX ADMIN — ENOXAPARIN SODIUM 40 MG: 100 INJECTION SUBCUTANEOUS at 09:15

## 2025-08-23 RX ADMIN — MEROPENEM 1000 MG: 1 INJECTION INTRAVENOUS at 17:11

## 2025-08-23 RX ADMIN — MEROPENEM 1000 MG: 1 INJECTION INTRAVENOUS at 09:28

## 2025-08-23 RX ADMIN — HYDROMORPHONE HYDROCHLORIDE 1 MG: 1 INJECTION, SOLUTION INTRAMUSCULAR; INTRAVENOUS; SUBCUTANEOUS at 04:53

## 2025-08-23 RX ADMIN — PROPRANOLOL HYDROCHLORIDE 10 MG: 10 TABLET ORAL at 20:40

## 2025-08-23 RX ADMIN — PROPRANOLOL HYDROCHLORIDE 10 MG: 10 TABLET ORAL at 09:15

## 2025-08-23 RX ADMIN — HYDROMORPHONE HYDROCHLORIDE 1 MG: 1 INJECTION, SOLUTION INTRAMUSCULAR; INTRAVENOUS; SUBCUTANEOUS at 09:37

## 2025-08-23 RX ADMIN — HYDROMORPHONE HYDROCHLORIDE 1 MG: 1 INJECTION, SOLUTION INTRAMUSCULAR; INTRAVENOUS; SUBCUTANEOUS at 20:40

## 2025-08-23 RX ADMIN — HYDROMORPHONE HYDROCHLORIDE 1 MG: 1 INJECTION, SOLUTION INTRAMUSCULAR; INTRAVENOUS; SUBCUTANEOUS at 17:11

## 2025-08-23 RX ADMIN — SODIUM CHLORIDE, PRESERVATIVE FREE 10 ML: 5 INJECTION INTRAVENOUS at 09:16

## 2025-08-23 RX ADMIN — HYDROMORPHONE HYDROCHLORIDE 1 MG: 1 INJECTION, SOLUTION INTRAMUSCULAR; INTRAVENOUS; SUBCUTANEOUS at 13:50

## 2025-08-23 ASSESSMENT — PAIN SCALES - GENERAL
PAINLEVEL_OUTOF10: 7
PAINLEVEL_OUTOF10: 7
PAINLEVEL_OUTOF10: 6
PAINLEVEL_OUTOF10: 8
PAINLEVEL_OUTOF10: 6
PAINLEVEL_OUTOF10: 5
PAINLEVEL_OUTOF10: 8
PAINLEVEL_OUTOF10: 4
PAINLEVEL_OUTOF10: 5
PAINLEVEL_OUTOF10: 4
PAINLEVEL_OUTOF10: 6

## 2025-08-23 ASSESSMENT — PAIN - FUNCTIONAL ASSESSMENT
PAIN_FUNCTIONAL_ASSESSMENT: 0-10

## 2025-08-23 ASSESSMENT — PAIN DESCRIPTION - LOCATION
LOCATION: ABDOMEN

## 2025-08-24 VITALS
SYSTOLIC BLOOD PRESSURE: 113 MMHG | RESPIRATION RATE: 16 BRPM | WEIGHT: 199 LBS | TEMPERATURE: 99.3 F | HEART RATE: 77 BPM | HEIGHT: 62 IN | OXYGEN SATURATION: 91 % | BODY MASS INDEX: 36.62 KG/M2 | DIASTOLIC BLOOD PRESSURE: 63 MMHG

## 2025-08-24 LAB
ANION GAP SERPL CALC-SCNC: 9 MMOL/L (ref 2–14)
BUN SERPL-MCNC: 10 MG/DL (ref 6–20)
BUN/CREAT SERPL: 16 (ref 12–20)
CALCIUM SERPL-MCNC: 8.4 MG/DL (ref 8.6–10)
CHLORIDE SERPL-SCNC: 102 MMOL/L (ref 98–107)
CO2 SERPL-SCNC: 28 MMOL/L (ref 20–29)
CREAT SERPL-MCNC: 0.66 MG/DL (ref 0.6–1)
GLUCOSE SERPL-MCNC: 72 MG/DL (ref 65–100)
MAGNESIUM SERPL-MCNC: 1.5 MG/DL (ref 1.6–2.6)
PHOSPHATE SERPL-MCNC: 2.2 MG/DL (ref 2.5–4.5)
POTASSIUM SERPL-SCNC: 4 MMOL/L (ref 3.5–5.1)
SODIUM SERPL-SCNC: 138 MMOL/L (ref 136–145)

## 2025-08-24 PROCEDURE — 84100 ASSAY OF PHOSPHORUS: CPT

## 2025-08-24 PROCEDURE — 2500000003 HC RX 250 WO HCPCS: Performed by: STUDENT IN AN ORGANIZED HEALTH CARE EDUCATION/TRAINING PROGRAM

## 2025-08-24 PROCEDURE — 6370000000 HC RX 637 (ALT 250 FOR IP): Performed by: STUDENT IN AN ORGANIZED HEALTH CARE EDUCATION/TRAINING PROGRAM

## 2025-08-24 PROCEDURE — 94761 N-INVAS EAR/PLS OXIMETRY MLT: CPT

## 2025-08-24 PROCEDURE — 80048 BASIC METABOLIC PNL TOTAL CA: CPT

## 2025-08-24 PROCEDURE — 83735 ASSAY OF MAGNESIUM: CPT

## 2025-08-24 PROCEDURE — 2580000003 HC RX 258: Performed by: NURSE PRACTITIONER

## 2025-08-24 PROCEDURE — 2580000003 HC RX 258: Performed by: INTERNAL MEDICINE

## 2025-08-24 PROCEDURE — 6360000002 HC RX W HCPCS: Performed by: INTERNAL MEDICINE

## 2025-08-24 PROCEDURE — 6360000002 HC RX W HCPCS: Performed by: STUDENT IN AN ORGANIZED HEALTH CARE EDUCATION/TRAINING PROGRAM

## 2025-08-24 RX ADMIN — PROPRANOLOL HYDROCHLORIDE 10 MG: 10 TABLET ORAL at 08:46

## 2025-08-24 RX ADMIN — ESCITALOPRAM OXALATE 10 MG: 10 TABLET ORAL at 08:46

## 2025-08-24 RX ADMIN — MEROPENEM 1000 MG: 1 INJECTION INTRAVENOUS at 09:07

## 2025-08-24 RX ADMIN — SODIUM CHLORIDE, SODIUM LACTATE, POTASSIUM CHLORIDE, AND CALCIUM CHLORIDE: .6; .31; .03; .02 INJECTION, SOLUTION INTRAVENOUS at 00:27

## 2025-08-24 RX ADMIN — HYDROMORPHONE HYDROCHLORIDE 1 MG: 1 INJECTION, SOLUTION INTRAMUSCULAR; INTRAVENOUS; SUBCUTANEOUS at 00:28

## 2025-08-24 RX ADMIN — HYDROMORPHONE HYDROCHLORIDE 1 MG: 1 INJECTION, SOLUTION INTRAMUSCULAR; INTRAVENOUS; SUBCUTANEOUS at 08:47

## 2025-08-24 RX ADMIN — ENOXAPARIN SODIUM 40 MG: 100 INJECTION SUBCUTANEOUS at 08:46

## 2025-08-24 RX ADMIN — PANTOPRAZOLE SODIUM 40 MG: 40 INJECTION, POWDER, FOR SOLUTION INTRAVENOUS at 08:47

## 2025-08-24 RX ADMIN — MEROPENEM 1000 MG: 1 INJECTION INTRAVENOUS at 00:27

## 2025-08-24 RX ADMIN — SODIUM CHLORIDE, PRESERVATIVE FREE 10 ML: 5 INJECTION INTRAVENOUS at 08:48

## 2025-08-24 RX ADMIN — HYDROMORPHONE HYDROCHLORIDE 1 MG: 1 INJECTION, SOLUTION INTRAMUSCULAR; INTRAVENOUS; SUBCUTANEOUS at 12:39

## 2025-08-24 RX ADMIN — HYDROMORPHONE HYDROCHLORIDE 1 MG: 1 INJECTION, SOLUTION INTRAMUSCULAR; INTRAVENOUS; SUBCUTANEOUS at 04:27

## 2025-08-24 RX ADMIN — PROCHLORPERAZINE EDISYLATE 5 MG: 5 INJECTION INTRAMUSCULAR; INTRAVENOUS at 06:54

## 2025-08-24 ASSESSMENT — PAIN SCALES - GENERAL
PAINLEVEL_OUTOF10: 7
PAINLEVEL_OUTOF10: 4
PAINLEVEL_OUTOF10: 6
PAINLEVEL_OUTOF10: 5
PAINLEVEL_OUTOF10: 6

## 2025-08-24 ASSESSMENT — PAIN DESCRIPTION - LOCATION
LOCATION: ABDOMEN
LOCATION: ABDOMEN

## 2025-08-24 ASSESSMENT — PAIN - FUNCTIONAL ASSESSMENT
PAIN_FUNCTIONAL_ASSESSMENT: 0-10
PAIN_FUNCTIONAL_ASSESSMENT: 0-10

## 2025-08-25 ENCOUNTER — TELEPHONE (OUTPATIENT)
Facility: CLINIC | Age: 50
End: 2025-08-25

## 2025-09-05 ENCOUNTER — PATIENT MESSAGE (OUTPATIENT)
Facility: CLINIC | Age: 50
End: 2025-09-05

## 2025-09-05 DIAGNOSIS — J45.20 MILD INTERMITTENT EXTRINSIC ASTHMA WITHOUT COMPLICATION: ICD-10-CM

## 2025-09-05 RX ORDER — ALBUTEROL SULFATE 90 UG/1
2 INHALANT RESPIRATORY (INHALATION) 4 TIMES DAILY PRN
Qty: 1 EACH | Refills: 3 | Status: SHIPPED | OUTPATIENT
Start: 2025-09-05

## (undated) DEVICE — SKIN TEMPERATURE SENSOR: Brand: DEROYAL

## (undated) DEVICE — SOLIDIFIER FLUID 3000 CC ABSORB

## (undated) DEVICE — SUTURE VCRL SZ 3-0 L27IN ABSRB VLT L26MM SH 1/2 CIR J316H

## (undated) DEVICE — SUTURE PERMAHAND SZ 3-0 L18IN NONABSORBABLE BLK L26MM SH C013D

## (undated) DEVICE — SOLUTION IV 1000ML 0.9% SOD CHL

## (undated) DEVICE — SUTURE VCRL SZ 0 L27IN ABSRB UD L36MM CT-1 1/2 CIR J260H

## (undated) DEVICE — UNIVERSAL FIXATION CANNULA: Brand: VERSAONE

## (undated) DEVICE — SLIM BODY SKIN STAPLER: Brand: APPOSE ULC

## (undated) DEVICE — CATHETER DRNGE 32FR 4 WNG DISP FOR NEPHSTMY MALECOTS

## (undated) DEVICE — ZINACTIVE USE 2641837 CLIP LIG M BLU TI HRT SHP WIRE HORZ 600 PER BX

## (undated) DEVICE — DERMABOND SKIN ADH 0.7ML -- DERMABOND ADVANCED 12/BX

## (undated) DEVICE — SUT SLK 3-0 30IN SH BLK --

## (undated) DEVICE — BAG BELONG PT PERS CLEAR HANDL

## (undated) DEVICE — Device

## (undated) DEVICE — (D)PREP SKN CHLRAPRP APPL 26ML -- CONVERT TO ITEM 371833

## (undated) DEVICE — SUTURE VCRL SZ 0 L36IN ABSRB VLT L36MM CT-1 1/2 CIR J346H

## (undated) DEVICE — SYR 50ML LR LCK 1ML GRAD NSAF --

## (undated) DEVICE — SUTURE PDS II SZ 1 L54IN ABSRB VLT L65MM TP-1 1/2 CIR Z879G

## (undated) DEVICE — ROCKER SWITCH PENCIL BLADE ELECTRODE, HOLSTER: Brand: EDGE

## (undated) DEVICE — KENDALL SCD EXPRESS SLEEVES, KNEE LENGTH, MEDIUM: Brand: KENDALL SCD

## (undated) DEVICE — SUTURE PERMAHAND SZ 2-0 L18IN NONABSORBABLE BLK L26MM SH C012D

## (undated) DEVICE — STERILE POLYISOPRENE POWDER-FREE SURGICAL GLOVES WITH EMOLLIENT COATING: Brand: PROTEXIS

## (undated) DEVICE — SUTURE MCRYL SZ 4-0 L27IN ABSRB UD L19MM PS-2 1/2 CIR PRIM Y426H

## (undated) DEVICE — UNIVERSAL STAPLER: Brand: ENDO GIA ULTRA

## (undated) DEVICE — SUTURE VCRL SZ 0 L27IN ABSRB VLT L26MM CT-2 1/2 CIR J334H

## (undated) DEVICE — REM POLYHESIVE ADULT PATIENT RETURN ELECTRODE: Brand: VALLEYLAB

## (undated) DEVICE — BLADELESS OPTICAL TROCAR WITH FIXATION CANNULA: Brand: VERSAPORT

## (undated) DEVICE — QUILTED PREMIUM COMFORT UNDERPAD,EXTRA HEAVY: Brand: WINGS

## (undated) DEVICE — KENDALL RADIOLUCENT FOAM MONITORING ELECTRODE -RECTANGULAR SHAPE: Brand: KENDALL

## (undated) DEVICE — WRISTBAND ID AD W1XL11.5IN RED POLY ALRG PREPRINTED PERM

## (undated) DEVICE — DRSG FOAM VAC 10X15X1CM WHT --

## (undated) DEVICE — 1200 GUARD II KIT W/5MM TUBE W/O VAC TUBE: Brand: GUARDIAN

## (undated) DEVICE — NEEDLE HYPO 22GA L1.5IN BLK S STL HUB POLYPR SHLD REG BVL

## (undated) DEVICE — INFECTION CONTROL KIT SYS

## (undated) DEVICE — SUTURE VCRL SZ 3-0 L27IN ABSRB UD L17MM RB-1 1/2 CIR J215H

## (undated) DEVICE — Z INACTIVE USE 2240337 DRAPE SURG PT TRANSFER TRAWAY SHT

## (undated) DEVICE — DEVICE INFL 20ML L13IN 30ATM CLR POLYCARB TB PRTBL DGT

## (undated) DEVICE — BAG DRNGE 4000ML CONT IRRIG ROUNDED TEARDROP SHP DISP

## (undated) DEVICE — KIT IV STRT W CHLORAPREP PD 1ML

## (undated) DEVICE — BLADE ELECTRODE: Brand: EDGE

## (undated) DEVICE — DBD-PACK,LAPAROTOMY,2 REINFORCED GOWNS: Brand: MEDLINE

## (undated) DEVICE — GAUZE SPONGES,12 PLY: Brand: CURITY

## (undated) DEVICE — COLOSTOMY/ILEOSTOMY KIT,FLEXWEAR: Brand: NEW IMAGE

## (undated) DEVICE — SUTURE PDS II SZ 1 L27IN ABSRB VLT CT-1 L36MM 1/2 CIR Z341H

## (undated) DEVICE — POOLE SUCTION INSTRUMENT WITH REMOVABLE SHEATH: Brand: POOLE

## (undated) DEVICE — SUTURE ETHLN SZ 2-0 L18IN NONABSORBABLE BLK L26MM FS 3/8 664G

## (undated) DEVICE — DRAIN CHN 19FR L0.25MM DIA6.3MM SIL RND HUBLESS FULL FLUT

## (undated) DEVICE — MICROPUNCTURE INTRODUCER SET SILHOUETTE TRANSITIONLESS WITH STAINLESS STEEL WIRE GUIDE: Brand: MICROPUNCTURE

## (undated) DEVICE — MEDI-VAC NON-CONDUCTIVE SUCTION TUBING: Brand: CARDINAL HEALTH

## (undated) DEVICE — TOWEL SURG W17XL27IN STD BLU COT NONFENESTRATED PREWASHED

## (undated) DEVICE — TRAY PREP DRY W/ PREM GLV 2 APPL 6 SPNG 2 UNDPD 1 OVERWRAP

## (undated) DEVICE — SINGLE USE SPLINTING TUBE: Brand: SINGLE USE SPLINTING TUBE

## (undated) DEVICE — SYR 3ML LL TIP 1/10ML GRAD --

## (undated) DEVICE — CATH URETH INTMIT ROB 20FR FUN -- PLEASE USE 151715

## (undated) DEVICE — GOWN,SIRUS,FABRNF,XL,20/CS: Brand: MEDLINE

## (undated) DEVICE — SUTURE VCRL SZ 3-0 L27IN ABSRB UD L26MM SH 1/2 CIR J416H

## (undated) DEVICE — TUBING INSUFLTN 10FT LUER -- CONVERT TO ITEM 368568

## (undated) DEVICE — SET ADMIN 16ML TBNG L100IN 2 Y INJ SITE IV PIGGY BK DISP

## (undated) DEVICE — CATHETER ANGIO PIG MB FLSH 5 FRX65 CM 6 SH SUPER TORQUE

## (undated) DEVICE — HANDLE LT SNAP ON ULT DURABLE LENS FOR TRUMPF ALC DISPOSABLE

## (undated) DEVICE — SPONGE LAP 18X18IN STRL -- 5/PK

## (undated) DEVICE — TISSEEL VHSD 10 ML KT 1504516] BAXTER BIOSURGERY]

## (undated) DEVICE — NEONATAL-ADULT SPO2 SENSOR: Brand: NELLCOR

## (undated) DEVICE — CLIP LIG L TI MTL LIG SYS 24 COUNT HORZ

## (undated) DEVICE — SUTURE PERMAHAND SZ 3-0 L30IN NONABSORBABLE BLK SILK BRAID A304H

## (undated) DEVICE — SOLUTION IRRIG 1000ML H2O STRL BLT

## (undated) DEVICE — NEEDLE HYPO 18GA L1.5IN PNK S STL HUB POLYPR SHLD REG BVL

## (undated) DEVICE — CATHETER ANGIO 5FR L100CM GWIRE 0.035IN 1CM SPC OMNI FLSH

## (undated) DEVICE — CURVED, LARGE JAW, OPEN SEALER/DIVIDER NANO-COATED: Brand: LIGASURE IMPACT

## (undated) DEVICE — COLON CLOSING PACK: Brand: MEDLINE INDUSTRIES, INC.

## (undated) DEVICE — AMC/4 ARTERIAL NEEDLE – 18GA X 2.75” (7CM): Brand: ARTERIAL NEEDLE

## (undated) DEVICE — SURGICAL PROCEDURE KIT GEN LAPAROSCOPY LF

## (undated) DEVICE — DRAPE,REIN 53X77,STERILE: Brand: MEDLINE

## (undated) DEVICE — DRAPE SURG W41XL74IN CLR FULL SZ C ARM 3 ADH POLY STRP E

## (undated) DEVICE — SUTURE PDS II SZ 1 L96IN ABSRB VLT TP-1 L65MM 1/2 CIR Z880G

## (undated) DEVICE — DEVON™ KNEE AND BODY STRAP 60" X 3" (1.5 M X 7.6 CM): Brand: DEVON

## (undated) DEVICE — DRAIN SURG 19FR L025IN DIA63MM SIL CHN RND FULL FLUT CLS

## (undated) DEVICE — KIT NEG PRSS W15XH16XL26CM THN 1 PERF DSG 2 SHT STD DRP 1

## (undated) DEVICE — KIT DSG LRG NEG PRSS WND THER VAC GRANUFOAM

## (undated) DEVICE — TRAY CATH OD16FR SIL URIN M STATLOK STBL DEV SURSTP

## (undated) DEVICE — DRAPE XR C ARM 41X74IN LF --

## (undated) DEVICE — KENDALL SCD EXPRESS SLEEVES, KNEE LENGTH, LARGE: Brand: KENDALL SCD

## (undated) DEVICE — TRAY CATH 16F DRN BG LTX -- CONVERT TO ITEM 363158

## (undated) DEVICE — BW-412T DISP COMBO CLEANING BRUSH: Brand: SINGLE USE COMBINATION CLEANING BRUSH

## (undated) DEVICE — ARTICULATING RELOAD WITH TRI-STAPLE TECHNOLOGY: Brand: ENDO GIA

## (undated) DEVICE — BAG DRAIN URIN 2000ML LF STRL -- CONVERT TO ITEM 363123

## (undated) DEVICE — 34" SINGLE PATIENT USE HOVERMATT BREATHABLE: Brand: SINGLE PATIENT USE HOVERMATT

## (undated) DEVICE — SUT ETHLN 3-0 18IN PS2 BLK --

## (undated) DEVICE — SUT ETHLN 2-0 18IN FS BLK --

## (undated) DEVICE — SUTURE PERMAHAND SZ 2-0 L30IN NONABSORBABLE BLK SILK W/O A305H

## (undated) DEVICE — SURGICAL PROCEDURE PACK BASIN MAJ SET CUST NO CAUT

## (undated) DEVICE — 3M™ TEGADERM™ TRANSPARENT FILM DRESSING FRAME STYLE, 1626W, 4 IN X 4-3/4 IN (10 CM X 12 CM), 50/CT 4CT/CASE: Brand: 3M™ TEGADERM™

## (undated) DEVICE — RELOAD STPL 45MM THCK TISS GRN W/ GRIPPING SURF TECHNOLOGY

## (undated) DEVICE — BLADE ASSEMB CLP HAIR FINE --

## (undated) DEVICE — DRAPE FLD WRM W44XL66IN C6L FOR INTRATEMP SYS THERMABASIN

## (undated) DEVICE — CLIP HEMO ENDOSCP 235CM BX/10 -- RESOLUTION 360

## (undated) DEVICE — CATH IV AUTOGRD BC YEL 24GA 19 -- INSYTE

## (undated) DEVICE — SET EXTN TBNG L BOR 4 W STPCOCK ST 32IN PRIMING VOL 6ML

## (undated) DEVICE — PINNACLE INTRODUCER SHEATH: Brand: PINNACLE

## (undated) DEVICE — RADIFOCUS GLIDEWIRE: Brand: GLIDEWIRE

## (undated) DEVICE — DOBBHOFF 12FR X 43 INCH WITHOUT STYLET: Brand: KANGAROO

## (undated) DEVICE — CATHETER GUID MP1 0.071 INX6 FRX100 CM LG LUMEN LAUNCHER

## (undated) DEVICE — SOLUTION IV 500ML 0.9% SOD CHL FLX CONT

## (undated) DEVICE — SUTURE NONABSORBABLE MONOFILAMENT 4-0 CV-5 PT-13 24 IN GORTX 5K08B

## (undated) DEVICE — SUTURE PROL SZ 2-0 L36IN NONABSORBABLE BLU SH L26MM 1/2 CIR 8523H

## (undated) DEVICE — FOGARTY ARTERIAL EMBOLECTOMY CATHETER 4F 80CM: Brand: FOGARTY

## (undated) DEVICE — DRAIN INCIS W5/8XL15IN OD15MM SIL 3 LUMN SUMP FLTR ABRAMSON

## (undated) DEVICE — CATHETER ANGIO 5FR L100CM GWIRE 0.038IN BERN NONBRAIDED HI

## (undated) DEVICE — ENDO CARRY-ON PROCEDURE KIT INCLUDES ENZYMATIC SPONGE, GAUZE, BIOHAZARD LABEL, TRAY, LUBRICANT, DIRTY SCOPE LABEL, WATER LABEL, TRAY, DRAWSTRING PAD, AND DEFENDO 4-PIECE KIT.: Brand: ENDO CARRY-ON PROCEDURE KIT

## (undated) DEVICE — SYRINGE MED 20ML STD CLR PLAS LUERLOCK TIP N CTRL DISP

## (undated) DEVICE — BLOCK BITE ENDO --

## (undated) DEVICE — CANN NASAL O2 CAPNOGRAPHY AD -- FILTERLINE

## (undated) DEVICE — SUTURE VCRL SZ 2-0 L27IN ABSRB UD L26MM SH 1/2 CIR J417H